# Patient Record
Sex: FEMALE | Race: WHITE | NOT HISPANIC OR LATINO | Employment: OTHER | URBAN - METROPOLITAN AREA
[De-identification: names, ages, dates, MRNs, and addresses within clinical notes are randomized per-mention and may not be internally consistent; named-entity substitution may affect disease eponyms.]

---

## 2017-01-05 ENCOUNTER — COMMUNICATION - HEALTHEAST (OUTPATIENT)
Dept: NURSING | Facility: CLINIC | Age: 77
End: 2017-01-05

## 2017-01-06 ENCOUNTER — COMMUNICATION - HEALTHEAST (OUTPATIENT)
Dept: ENDOCRINOLOGY | Facility: CLINIC | Age: 77
End: 2017-01-06

## 2017-01-06 DIAGNOSIS — I10 ESSENTIAL HYPERTENSION: ICD-10-CM

## 2017-01-06 DIAGNOSIS — D35.00 ADRENAL ADENOMA, UNSPECIFIED LATERALITY: ICD-10-CM

## 2017-01-13 ENCOUNTER — OFFICE VISIT - HEALTHEAST (OUTPATIENT)
Dept: NURSING | Facility: CLINIC | Age: 77
End: 2017-01-13

## 2017-01-13 DIAGNOSIS — F33.9 EPISODE OF RECURRENT MAJOR DEPRESSIVE DISORDER, UNSPECIFIED DEPRESSION EPISODE SEVERITY (H): ICD-10-CM

## 2017-01-13 DIAGNOSIS — E03.9 HYPOTHYROIDISM, UNSPECIFIED TYPE: ICD-10-CM

## 2017-01-13 DIAGNOSIS — I10 ESSENTIAL HYPERTENSION WITH GOAL BLOOD PRESSURE LESS THAN 140/90: ICD-10-CM

## 2017-01-13 DIAGNOSIS — F41.9 ANXIETY: ICD-10-CM

## 2017-01-13 DIAGNOSIS — D35.00 ADRENAL ADENOMA, UNSPECIFIED LATERALITY: ICD-10-CM

## 2017-01-18 ENCOUNTER — COMMUNICATION - HEALTHEAST (OUTPATIENT)
Dept: FAMILY MEDICINE | Facility: CLINIC | Age: 77
End: 2017-01-18

## 2017-01-18 DIAGNOSIS — K21.9 ESOPHAGEAL REFLUX: ICD-10-CM

## 2017-02-03 ENCOUNTER — COMMUNICATION - HEALTHEAST (OUTPATIENT)
Dept: NURSING | Facility: CLINIC | Age: 77
End: 2017-02-03

## 2017-02-21 ENCOUNTER — AMBULATORY - HEALTHEAST (OUTPATIENT)
Dept: PODIATRY | Age: 77
End: 2017-02-21

## 2017-02-21 DIAGNOSIS — L60.2 ONYCHAUXIS: ICD-10-CM

## 2017-02-21 DIAGNOSIS — M79.673 PAIN OF FOOT, UNSPECIFIED LATERALITY: ICD-10-CM

## 2017-02-28 ENCOUNTER — OFFICE VISIT - HEALTHEAST (OUTPATIENT)
Dept: FAMILY MEDICINE | Facility: CLINIC | Age: 77
End: 2017-02-28

## 2017-02-28 ENCOUNTER — OFFICE VISIT - HEALTHEAST (OUTPATIENT)
Dept: NURSING | Facility: CLINIC | Age: 77
End: 2017-02-28

## 2017-02-28 DIAGNOSIS — M54.16 LEFT LUMBAR RADICULOPATHY: ICD-10-CM

## 2017-02-28 DIAGNOSIS — I10 ESSENTIAL HYPERTENSION WITH GOAL BLOOD PRESSURE LESS THAN 140/90: ICD-10-CM

## 2017-02-28 DIAGNOSIS — M15.0 PRIMARY OSTEOARTHRITIS INVOLVING MULTIPLE JOINTS: ICD-10-CM

## 2017-02-28 ASSESSMENT — MIFFLIN-ST. JEOR: SCORE: 1261.94

## 2017-03-07 ENCOUNTER — HOSPITAL ENCOUNTER (OUTPATIENT)
Dept: MRI IMAGING | Facility: HOSPITAL | Age: 77
Discharge: HOME | End: 2017-03-07
Attending: FAMILY MEDICINE

## 2017-03-07 DIAGNOSIS — M54.16 LEFT LUMBAR RADICULOPATHY: ICD-10-CM

## 2017-03-08 ENCOUNTER — COMMUNICATION - HEALTHEAST (OUTPATIENT)
Dept: NURSING | Facility: CLINIC | Age: 77
End: 2017-03-08

## 2017-03-09 ENCOUNTER — COMMUNICATION - HEALTHEAST (OUTPATIENT)
Dept: FAMILY MEDICINE | Facility: CLINIC | Age: 77
End: 2017-03-09

## 2017-03-13 ENCOUNTER — HOSPITAL ENCOUNTER (OUTPATIENT)
Dept: PHYSICAL MEDICINE AND REHAB | Facility: CLINIC | Age: 77
Discharge: HOME | End: 2017-03-13
Attending: PHYSICAL MEDICINE & REHABILITATION

## 2017-03-13 DIAGNOSIS — M54.50 ACUTE LOW BACK PAIN: ICD-10-CM

## 2017-03-13 DIAGNOSIS — M47.816 LUMBAR FACET JOINT SYNDROME: ICD-10-CM

## 2017-03-13 DIAGNOSIS — M47.816 LUMBAR SPONDYLOSIS: ICD-10-CM

## 2017-03-13 DIAGNOSIS — M16.9 HIP OSTEOARTHRITIS: ICD-10-CM

## 2017-03-13 DIAGNOSIS — M53.3 SACROILIAC JOINT DYSFUNCTION OF LEFT SIDE: ICD-10-CM

## 2017-03-13 ASSESSMENT — MIFFLIN-ST. JEOR: SCORE: 1261.94

## 2017-03-14 ENCOUNTER — COMMUNICATION - HEALTHEAST (OUTPATIENT)
Dept: PHYSICAL MEDICINE AND REHAB | Facility: CLINIC | Age: 77
End: 2017-03-14

## 2017-03-14 ENCOUNTER — RECORDS - HEALTHEAST (OUTPATIENT)
Dept: GENERAL RADIOLOGY | Facility: CLINIC | Age: 77
End: 2017-03-14

## 2017-03-14 DIAGNOSIS — M16.9 OSTEOARTHRITIS OF HIP, UNSPECIFIED: ICD-10-CM

## 2017-03-15 ENCOUNTER — OFFICE VISIT - HEALTHEAST (OUTPATIENT)
Dept: PHYSICAL THERAPY | Facility: REHABILITATION | Age: 77
End: 2017-03-15

## 2017-03-15 DIAGNOSIS — M53.3 SACRAL PAIN: ICD-10-CM

## 2017-03-15 DIAGNOSIS — M25.552 ACUTE PAIN OF LEFT HIP: ICD-10-CM

## 2017-03-15 DIAGNOSIS — M62.81 GENERALIZED MUSCLE WEAKNESS: ICD-10-CM

## 2017-03-20 ENCOUNTER — OFFICE VISIT - HEALTHEAST (OUTPATIENT)
Dept: PHYSICAL THERAPY | Facility: REHABILITATION | Age: 77
End: 2017-03-20

## 2017-03-20 DIAGNOSIS — M53.3 SACRAL PAIN: ICD-10-CM

## 2017-03-20 DIAGNOSIS — M25.552 ACUTE PAIN OF LEFT HIP: ICD-10-CM

## 2017-03-20 DIAGNOSIS — M62.81 GENERALIZED MUSCLE WEAKNESS: ICD-10-CM

## 2017-03-21 ENCOUNTER — RECORDS - HEALTHEAST (OUTPATIENT)
Dept: GENERAL RADIOLOGY | Facility: CLINIC | Age: 77
End: 2017-03-21

## 2017-03-21 ENCOUNTER — OFFICE VISIT - HEALTHEAST (OUTPATIENT)
Dept: FAMILY MEDICINE | Facility: CLINIC | Age: 77
End: 2017-03-21

## 2017-03-21 DIAGNOSIS — M25.512 CHRONIC LEFT SHOULDER PAIN: ICD-10-CM

## 2017-03-21 DIAGNOSIS — M25.512 PAIN IN LEFT SHOULDER: ICD-10-CM

## 2017-03-21 DIAGNOSIS — G89.29 OTHER CHRONIC PAIN: ICD-10-CM

## 2017-03-21 DIAGNOSIS — G89.29 CHRONIC LEFT SHOULDER PAIN: ICD-10-CM

## 2017-03-24 ENCOUNTER — OFFICE VISIT - HEALTHEAST (OUTPATIENT)
Dept: PHYSICAL THERAPY | Facility: REHABILITATION | Age: 77
End: 2017-03-24

## 2017-03-24 DIAGNOSIS — M54.41 CHRONIC BILATERAL LOW BACK PAIN WITH RIGHT-SIDED SCIATICA: ICD-10-CM

## 2017-03-24 DIAGNOSIS — G89.29 CHRONIC BILATERAL LOW BACK PAIN WITH RIGHT-SIDED SCIATICA: ICD-10-CM

## 2017-03-24 DIAGNOSIS — M25.552 ACUTE PAIN OF LEFT HIP: ICD-10-CM

## 2017-03-24 DIAGNOSIS — M62.81 GENERALIZED MUSCLE WEAKNESS: ICD-10-CM

## 2017-03-24 DIAGNOSIS — M53.3 SACRAL PAIN: ICD-10-CM

## 2017-03-24 DIAGNOSIS — M54.50 LOW BACK PAIN: ICD-10-CM

## 2017-03-27 ENCOUNTER — OFFICE VISIT - HEALTHEAST (OUTPATIENT)
Dept: FAMILY MEDICINE | Facility: CLINIC | Age: 77
End: 2017-03-27

## 2017-03-27 ENCOUNTER — OFFICE VISIT - HEALTHEAST (OUTPATIENT)
Dept: PHYSICAL THERAPY | Facility: REHABILITATION | Age: 77
End: 2017-03-27

## 2017-03-27 DIAGNOSIS — K21.9 ESOPHAGEAL REFLUX: ICD-10-CM

## 2017-03-27 DIAGNOSIS — M54.41 CHRONIC BILATERAL LOW BACK PAIN WITH RIGHT-SIDED SCIATICA: ICD-10-CM

## 2017-03-27 DIAGNOSIS — M62.81 GENERALIZED MUSCLE WEAKNESS: ICD-10-CM

## 2017-03-27 DIAGNOSIS — M25.552 ACUTE PAIN OF LEFT HIP: ICD-10-CM

## 2017-03-27 DIAGNOSIS — M53.3 SACRAL PAIN: ICD-10-CM

## 2017-03-27 DIAGNOSIS — R23.3 SKIN SPOTS, RED: ICD-10-CM

## 2017-03-27 DIAGNOSIS — G89.29 CHRONIC BILATERAL LOW BACK PAIN WITH RIGHT-SIDED SCIATICA: ICD-10-CM

## 2017-03-31 ENCOUNTER — OFFICE VISIT - HEALTHEAST (OUTPATIENT)
Dept: PHYSICAL THERAPY | Facility: REHABILITATION | Age: 77
End: 2017-03-31

## 2017-03-31 ENCOUNTER — COMMUNICATION - HEALTHEAST (OUTPATIENT)
Dept: NURSING | Facility: CLINIC | Age: 77
End: 2017-03-31

## 2017-03-31 ENCOUNTER — HOSPITAL ENCOUNTER (OUTPATIENT)
Dept: PHYSICAL MEDICINE AND REHAB | Facility: CLINIC | Age: 77
Discharge: HOME | End: 2017-03-31
Attending: PHYSICAL MEDICINE & REHABILITATION

## 2017-03-31 DIAGNOSIS — M25.552 ACUTE PAIN OF LEFT HIP: ICD-10-CM

## 2017-03-31 DIAGNOSIS — G89.29 CHRONIC LOW BACK PAIN WITHOUT SCIATICA: ICD-10-CM

## 2017-03-31 DIAGNOSIS — M53.3 SACRAL PAIN: ICD-10-CM

## 2017-03-31 DIAGNOSIS — M25.552 LEFT HIP PAIN: ICD-10-CM

## 2017-03-31 DIAGNOSIS — M54.41 CHRONIC BILATERAL LOW BACK PAIN WITH RIGHT-SIDED SCIATICA: ICD-10-CM

## 2017-03-31 DIAGNOSIS — M54.50 CHRONIC LOW BACK PAIN WITHOUT SCIATICA: ICD-10-CM

## 2017-03-31 DIAGNOSIS — G89.29 CHRONIC BILATERAL LOW BACK PAIN WITH RIGHT-SIDED SCIATICA: ICD-10-CM

## 2017-03-31 DIAGNOSIS — M53.3 SACROILIAC JOINT DYSFUNCTION OF LEFT SIDE: ICD-10-CM

## 2017-03-31 DIAGNOSIS — M47.816 LUMBAR FACET JOINT SYNDROME: ICD-10-CM

## 2017-03-31 DIAGNOSIS — M62.81 GENERALIZED MUSCLE WEAKNESS: ICD-10-CM

## 2017-03-31 DIAGNOSIS — M47.816 LUMBAR SPONDYLOSIS: ICD-10-CM

## 2017-04-04 ENCOUNTER — HOSPITAL ENCOUNTER (OUTPATIENT)
Dept: PHYSICAL MEDICINE AND REHAB | Facility: CLINIC | Age: 77
Discharge: HOME | End: 2017-04-04
Attending: PHYSICAL MEDICINE & REHABILITATION

## 2017-04-04 DIAGNOSIS — M54.50 CHRONIC LOW BACK PAIN WITHOUT SCIATICA: ICD-10-CM

## 2017-04-04 DIAGNOSIS — G89.29 CHRONIC LOW BACK PAIN WITHOUT SCIATICA: ICD-10-CM

## 2017-04-04 DIAGNOSIS — M53.3 SACROILIAC JOINT DYSFUNCTION OF LEFT SIDE: ICD-10-CM

## 2017-04-05 ENCOUNTER — OFFICE VISIT - HEALTHEAST (OUTPATIENT)
Dept: PHYSICAL THERAPY | Facility: REHABILITATION | Age: 77
End: 2017-04-05

## 2017-04-05 DIAGNOSIS — M25.512 CHRONIC LEFT SHOULDER PAIN: ICD-10-CM

## 2017-04-05 DIAGNOSIS — M62.81 GENERALIZED MUSCLE WEAKNESS: ICD-10-CM

## 2017-04-05 DIAGNOSIS — M25.612 DECREASED ROM OF LEFT SHOULDER: ICD-10-CM

## 2017-04-05 DIAGNOSIS — G89.29 CHRONIC LEFT SHOULDER PAIN: ICD-10-CM

## 2017-04-07 ENCOUNTER — COMMUNICATION - HEALTHEAST (OUTPATIENT)
Dept: ENDOCRINOLOGY | Facility: CLINIC | Age: 77
End: 2017-04-07

## 2017-04-07 DIAGNOSIS — I10 ESSENTIAL HYPERTENSION: ICD-10-CM

## 2017-04-07 DIAGNOSIS — D35.00 ADRENAL ADENOMA, UNSPECIFIED LATERALITY: ICD-10-CM

## 2017-04-11 ENCOUNTER — OFFICE VISIT - HEALTHEAST (OUTPATIENT)
Dept: PHYSICAL THERAPY | Facility: REHABILITATION | Age: 77
End: 2017-04-11

## 2017-04-11 DIAGNOSIS — M53.3 SACRAL PAIN: ICD-10-CM

## 2017-04-11 DIAGNOSIS — G89.29 CHRONIC BILATERAL LOW BACK PAIN WITH RIGHT-SIDED SCIATICA: ICD-10-CM

## 2017-04-11 DIAGNOSIS — M54.41 CHRONIC BILATERAL LOW BACK PAIN WITH RIGHT-SIDED SCIATICA: ICD-10-CM

## 2017-04-11 DIAGNOSIS — M25.512 CHRONIC LEFT SHOULDER PAIN: ICD-10-CM

## 2017-04-11 DIAGNOSIS — M25.612 DECREASED ROM OF LEFT SHOULDER: ICD-10-CM

## 2017-04-11 DIAGNOSIS — M62.81 GENERALIZED MUSCLE WEAKNESS: ICD-10-CM

## 2017-04-11 DIAGNOSIS — M25.552 ACUTE PAIN OF LEFT HIP: ICD-10-CM

## 2017-04-11 DIAGNOSIS — G89.29 CHRONIC LEFT SHOULDER PAIN: ICD-10-CM

## 2017-04-13 ENCOUNTER — COMMUNICATION - HEALTHEAST (OUTPATIENT)
Dept: ENDOCRINOLOGY | Facility: CLINIC | Age: 77
End: 2017-04-13

## 2017-04-13 DIAGNOSIS — D35.00 ADRENAL ADENOMA: ICD-10-CM

## 2017-04-13 DIAGNOSIS — E03.9 HYPOTHYROIDISM: ICD-10-CM

## 2017-04-14 ENCOUNTER — OFFICE VISIT - HEALTHEAST (OUTPATIENT)
Dept: PHYSICAL THERAPY | Facility: REHABILITATION | Age: 77
End: 2017-04-14

## 2017-04-14 DIAGNOSIS — M25.552 ACUTE PAIN OF LEFT HIP: ICD-10-CM

## 2017-04-14 DIAGNOSIS — M25.612 DECREASED ROM OF LEFT SHOULDER: ICD-10-CM

## 2017-04-14 DIAGNOSIS — M62.81 GENERALIZED MUSCLE WEAKNESS: ICD-10-CM

## 2017-04-14 DIAGNOSIS — M53.3 SACRAL PAIN: ICD-10-CM

## 2017-04-14 DIAGNOSIS — G89.29 CHRONIC LEFT SHOULDER PAIN: ICD-10-CM

## 2017-04-14 DIAGNOSIS — G89.29 CHRONIC BILATERAL LOW BACK PAIN WITH RIGHT-SIDED SCIATICA: ICD-10-CM

## 2017-04-14 DIAGNOSIS — M54.41 CHRONIC BILATERAL LOW BACK PAIN WITH RIGHT-SIDED SCIATICA: ICD-10-CM

## 2017-04-14 DIAGNOSIS — M25.512 CHRONIC LEFT SHOULDER PAIN: ICD-10-CM

## 2017-04-17 ENCOUNTER — OFFICE VISIT - HEALTHEAST (OUTPATIENT)
Dept: FAMILY MEDICINE | Facility: CLINIC | Age: 77
End: 2017-04-17

## 2017-04-17 DIAGNOSIS — D35.00 ADRENAL ADENOMA: ICD-10-CM

## 2017-04-17 DIAGNOSIS — R53.1 WEAKNESS GENERALIZED: ICD-10-CM

## 2017-04-17 DIAGNOSIS — E55.9 VITAMIN D DEFICIENCY: ICD-10-CM

## 2017-04-17 DIAGNOSIS — F41.9 ANXIETY: ICD-10-CM

## 2017-04-17 DIAGNOSIS — G47.00 INSOMNIA: ICD-10-CM

## 2017-04-17 DIAGNOSIS — E03.9 HYPOTHYROIDISM: ICD-10-CM

## 2017-04-17 DIAGNOSIS — I10 HTN (HYPERTENSION): ICD-10-CM

## 2017-04-19 ENCOUNTER — COMMUNICATION - HEALTHEAST (OUTPATIENT)
Dept: FAMILY MEDICINE | Facility: CLINIC | Age: 77
End: 2017-04-19

## 2017-04-19 ENCOUNTER — HOSPITAL ENCOUNTER (OUTPATIENT)
Dept: PHYSICAL MEDICINE AND REHAB | Facility: CLINIC | Age: 77
Discharge: HOME | End: 2017-04-19
Attending: PHYSICAL MEDICINE & REHABILITATION

## 2017-04-19 DIAGNOSIS — M47.816 LUMBAR SPONDYLOSIS: ICD-10-CM

## 2017-04-19 DIAGNOSIS — M47.816 LUMBAR FACET JOINT SYNDROME: ICD-10-CM

## 2017-04-19 DIAGNOSIS — M54.16 LUMBAR RADICULAR PAIN: ICD-10-CM

## 2017-04-19 DIAGNOSIS — G89.29 CHRONIC LOW BACK PAIN WITHOUT SCIATICA: ICD-10-CM

## 2017-04-19 DIAGNOSIS — M53.3 SACROILIAC JOINT DYSFUNCTION OF LEFT SIDE: ICD-10-CM

## 2017-04-19 DIAGNOSIS — M54.50 CHRONIC LOW BACK PAIN WITHOUT SCIATICA: ICD-10-CM

## 2017-04-21 ENCOUNTER — OFFICE VISIT - HEALTHEAST (OUTPATIENT)
Dept: PHYSICAL THERAPY | Facility: REHABILITATION | Age: 77
End: 2017-04-21

## 2017-04-21 DIAGNOSIS — M62.81 GENERALIZED MUSCLE WEAKNESS: ICD-10-CM

## 2017-04-21 DIAGNOSIS — M54.41 CHRONIC BILATERAL LOW BACK PAIN WITH RIGHT-SIDED SCIATICA: ICD-10-CM

## 2017-04-21 DIAGNOSIS — M25.612 DECREASED ROM OF LEFT SHOULDER: ICD-10-CM

## 2017-04-21 DIAGNOSIS — G89.29 CHRONIC BILATERAL LOW BACK PAIN WITH RIGHT-SIDED SCIATICA: ICD-10-CM

## 2017-04-21 DIAGNOSIS — M25.512 CHRONIC LEFT SHOULDER PAIN: ICD-10-CM

## 2017-04-21 DIAGNOSIS — M25.552 ACUTE PAIN OF LEFT HIP: ICD-10-CM

## 2017-04-21 DIAGNOSIS — G89.29 CHRONIC LEFT SHOULDER PAIN: ICD-10-CM

## 2017-04-21 DIAGNOSIS — M53.3 SACRAL PAIN: ICD-10-CM

## 2017-04-24 ENCOUNTER — HOSPITAL ENCOUNTER (OUTPATIENT)
Dept: PHYSICAL MEDICINE AND REHAB | Facility: CLINIC | Age: 77
Discharge: HOME | End: 2017-04-24
Attending: PHYSICAL MEDICINE & REHABILITATION

## 2017-04-24 DIAGNOSIS — M47.816 LUMBAR SPONDYLOSIS: ICD-10-CM

## 2017-04-24 DIAGNOSIS — M54.50 CHRONIC LOW BACK PAIN WITHOUT SCIATICA: ICD-10-CM

## 2017-04-24 DIAGNOSIS — M47.816 LUMBAR FACET JOINT SYNDROME: ICD-10-CM

## 2017-04-24 DIAGNOSIS — M12.88 OTHER SPECIFIC ARTHROPATHIES, NOT ELSEWHERE CLASSIFIED, OTHER SPECIFIED SITE: ICD-10-CM

## 2017-04-24 DIAGNOSIS — G89.29 CHRONIC LOW BACK PAIN WITHOUT SCIATICA: ICD-10-CM

## 2017-04-24 ASSESSMENT — MIFFLIN-ST. JEOR: SCORE: 1226.1

## 2017-04-27 ENCOUNTER — OFFICE VISIT - HEALTHEAST (OUTPATIENT)
Dept: PHYSICAL THERAPY | Facility: REHABILITATION | Age: 77
End: 2017-04-27

## 2017-04-27 ENCOUNTER — COMMUNICATION - HEALTHEAST (OUTPATIENT)
Dept: PHYSICAL MEDICINE AND REHAB | Facility: CLINIC | Age: 77
End: 2017-04-27

## 2017-04-27 DIAGNOSIS — M53.3 SACRAL PAIN: ICD-10-CM

## 2017-04-27 DIAGNOSIS — G89.29 CHRONIC LEFT SHOULDER PAIN: ICD-10-CM

## 2017-04-27 DIAGNOSIS — M62.81 GENERALIZED MUSCLE WEAKNESS: ICD-10-CM

## 2017-04-27 DIAGNOSIS — M25.612 DECREASED ROM OF LEFT SHOULDER: ICD-10-CM

## 2017-04-27 DIAGNOSIS — M25.512 CHRONIC LEFT SHOULDER PAIN: ICD-10-CM

## 2017-05-01 ENCOUNTER — COMMUNICATION - HEALTHEAST (OUTPATIENT)
Dept: NURSING | Facility: CLINIC | Age: 77
End: 2017-05-01

## 2017-05-02 ENCOUNTER — OFFICE VISIT - HEALTHEAST (OUTPATIENT)
Dept: NURSING | Facility: CLINIC | Age: 77
End: 2017-05-02

## 2017-05-02 ENCOUNTER — AMBULATORY - HEALTHEAST (OUTPATIENT)
Dept: PODIATRY | Age: 77
End: 2017-05-02

## 2017-05-02 DIAGNOSIS — I15.2 HYPERTENSION SECONDARY TO ENDOCRINE DISORDER WITH GOAL BLOOD PRESSURE LESS THAN 140/90: ICD-10-CM

## 2017-05-02 DIAGNOSIS — L60.2 ONYCHAUXIS: ICD-10-CM

## 2017-05-02 DIAGNOSIS — M79.673 PAIN OF FOOT, UNSPECIFIED LATERALITY: ICD-10-CM

## 2017-05-02 DIAGNOSIS — E03.9 HYPOTHYROIDISM, UNSPECIFIED TYPE: ICD-10-CM

## 2017-05-02 DIAGNOSIS — E34.9 HYPERTENSION SECONDARY TO ENDOCRINE DISORDER WITH GOAL BLOOD PRESSURE LESS THAN 140/90: ICD-10-CM

## 2017-05-02 DIAGNOSIS — D35.00 ADRENAL ADENOMA, UNSPECIFIED LATERALITY: ICD-10-CM

## 2017-05-02 DIAGNOSIS — I15.2 HYPERTENSION DUE TO ENDOCRINE DISORDER: ICD-10-CM

## 2017-05-03 ENCOUNTER — OFFICE VISIT - HEALTHEAST (OUTPATIENT)
Dept: PHYSICAL THERAPY | Facility: REHABILITATION | Age: 77
End: 2017-05-03

## 2017-05-03 DIAGNOSIS — M25.612 DECREASED ROM OF LEFT SHOULDER: ICD-10-CM

## 2017-05-03 DIAGNOSIS — M53.3 SACRAL PAIN: ICD-10-CM

## 2017-05-03 DIAGNOSIS — M25.552 ACUTE PAIN OF LEFT HIP: ICD-10-CM

## 2017-05-03 DIAGNOSIS — G89.29 CHRONIC LEFT SHOULDER PAIN: ICD-10-CM

## 2017-05-03 DIAGNOSIS — M25.512 CHRONIC LEFT SHOULDER PAIN: ICD-10-CM

## 2017-05-03 DIAGNOSIS — M62.81 GENERALIZED MUSCLE WEAKNESS: ICD-10-CM

## 2017-05-08 ENCOUNTER — HOME CARE/HOSPICE - HEALTHEAST (OUTPATIENT)
Dept: HOME HEALTH SERVICES | Facility: HOME HEALTH | Age: 77
End: 2017-05-08

## 2017-05-10 ENCOUNTER — OFFICE VISIT - HEALTHEAST (OUTPATIENT)
Dept: OCCUPATIONAL THERAPY | Facility: HOSPITAL | Age: 77
End: 2017-05-10

## 2017-05-12 ENCOUNTER — COMMUNICATION - HEALTHEAST (OUTPATIENT)
Dept: HOME HEALTH SERVICES | Facility: HOME HEALTH | Age: 77
End: 2017-05-12

## 2017-05-12 ENCOUNTER — COMMUNICATION - HEALTHEAST (OUTPATIENT)
Dept: FAMILY MEDICINE | Facility: CLINIC | Age: 77
End: 2017-05-12

## 2017-05-15 ENCOUNTER — OFFICE VISIT - HEALTHEAST (OUTPATIENT)
Dept: FAMILY MEDICINE | Facility: CLINIC | Age: 77
End: 2017-05-15

## 2017-05-15 DIAGNOSIS — I95.1 ORTHOSTATIC HYPOTENSION: ICD-10-CM

## 2017-05-15 DIAGNOSIS — K22.70 BARRETT ESOPHAGUS: ICD-10-CM

## 2017-05-15 DIAGNOSIS — R42 LIGHTHEADED: ICD-10-CM

## 2017-05-15 DIAGNOSIS — G47.00 INSOMNIA: ICD-10-CM

## 2017-05-15 DIAGNOSIS — K21.9 GERD (GASTROESOPHAGEAL REFLUX DISEASE): ICD-10-CM

## 2017-05-15 DIAGNOSIS — R68.81 EARLY SATIETY: ICD-10-CM

## 2017-05-18 ENCOUNTER — COMMUNICATION - HEALTHEAST (OUTPATIENT)
Dept: SCHEDULING | Facility: CLINIC | Age: 77
End: 2017-05-18

## 2017-05-23 ENCOUNTER — AMBULATORY - HEALTHEAST (OUTPATIENT)
Dept: LAB | Facility: CLINIC | Age: 77
End: 2017-05-23

## 2017-05-23 DIAGNOSIS — E03.9 HYPOTHYROIDISM, UNSPECIFIED TYPE: ICD-10-CM

## 2017-05-23 DIAGNOSIS — D35.00 ADRENAL ADENOMA, UNSPECIFIED LATERALITY: ICD-10-CM

## 2017-05-23 LAB
CREAT SERPL-MCNC: 0.62 MG/DL (ref 0.6–1.1)
GFR SERPL CREATININE-BSD FRML MDRD: >60 ML/MIN/1.73M2

## 2017-05-26 ENCOUNTER — COMMUNICATION - HEALTHEAST (OUTPATIENT)
Dept: FAMILY MEDICINE | Facility: CLINIC | Age: 77
End: 2017-05-26

## 2017-05-30 ENCOUNTER — COMMUNICATION - HEALTHEAST (OUTPATIENT)
Dept: FAMILY MEDICINE | Facility: CLINIC | Age: 77
End: 2017-05-30

## 2017-05-30 ENCOUNTER — OFFICE VISIT - HEALTHEAST (OUTPATIENT)
Dept: ENDOCRINOLOGY | Facility: CLINIC | Age: 77
End: 2017-05-30

## 2017-05-30 DIAGNOSIS — E03.9 HYPOTHYROIDISM, UNSPECIFIED TYPE: ICD-10-CM

## 2017-05-30 DIAGNOSIS — D35.02 ADRENAL ADENOMA, LEFT: ICD-10-CM

## 2017-05-30 ASSESSMENT — MIFFLIN-ST. JEOR: SCORE: 1213.86

## 2017-05-31 ENCOUNTER — COMMUNICATION - HEALTHEAST (OUTPATIENT)
Dept: NURSING | Facility: CLINIC | Age: 77
End: 2017-05-31

## 2017-06-05 ENCOUNTER — OFFICE VISIT - HEALTHEAST (OUTPATIENT)
Dept: FAMILY MEDICINE | Facility: CLINIC | Age: 77
End: 2017-06-05

## 2017-06-05 ENCOUNTER — COMMUNICATION - HEALTHEAST (OUTPATIENT)
Dept: NURSING | Facility: CLINIC | Age: 77
End: 2017-06-05

## 2017-06-05 DIAGNOSIS — R42 LIGHTHEADED: ICD-10-CM

## 2017-06-05 DIAGNOSIS — F41.9 ANXIETY: ICD-10-CM

## 2017-06-05 DIAGNOSIS — M25.559 HIP PAIN: ICD-10-CM

## 2017-06-05 DIAGNOSIS — G47.00 INSOMNIA: ICD-10-CM

## 2017-06-05 DIAGNOSIS — I10 ESSENTIAL HYPERTENSION WITH GOAL BLOOD PRESSURE LESS THAN 140/90: ICD-10-CM

## 2017-06-05 DIAGNOSIS — M54.9 BACK PAIN: ICD-10-CM

## 2017-06-05 DIAGNOSIS — R60.9 EDEMA: ICD-10-CM

## 2017-06-05 DIAGNOSIS — R00.0 TACHYCARDIA: ICD-10-CM

## 2017-06-06 ENCOUNTER — RECORDS - HEALTHEAST (OUTPATIENT)
Dept: ADMINISTRATIVE | Facility: OTHER | Age: 77
End: 2017-06-06

## 2017-06-06 ENCOUNTER — HOSPITAL ENCOUNTER (OUTPATIENT)
Dept: CARDIOLOGY | Facility: HOSPITAL | Age: 77
Discharge: HOME | End: 2017-06-06
Attending: FAMILY MEDICINE

## 2017-06-06 DIAGNOSIS — R00.0 TACHYCARDIA: ICD-10-CM

## 2017-06-07 ENCOUNTER — COMMUNICATION - HEALTHEAST (OUTPATIENT)
Dept: FAMILY MEDICINE | Facility: CLINIC | Age: 77
End: 2017-06-07

## 2017-06-07 DIAGNOSIS — I10 ESSENTIAL HYPERTENSION WITH GOAL BLOOD PRESSURE LESS THAN 140/90: ICD-10-CM

## 2017-06-08 ENCOUNTER — RECORDS - HEALTHEAST (OUTPATIENT)
Dept: ADMINISTRATIVE | Facility: OTHER | Age: 77
End: 2017-06-08

## 2017-06-09 ENCOUNTER — COMMUNICATION - HEALTHEAST (OUTPATIENT)
Dept: FAMILY MEDICINE | Facility: CLINIC | Age: 77
End: 2017-06-09

## 2017-06-12 ENCOUNTER — COMMUNICATION - HEALTHEAST (OUTPATIENT)
Dept: FAMILY MEDICINE | Facility: CLINIC | Age: 77
End: 2017-06-12

## 2017-06-13 ENCOUNTER — HOSPITAL ENCOUNTER (OUTPATIENT)
Dept: CARDIOLOGY | Facility: HOSPITAL | Age: 77
Discharge: HOME | End: 2017-06-13
Attending: FAMILY MEDICINE

## 2017-06-16 ENCOUNTER — COMMUNICATION - HEALTHEAST (OUTPATIENT)
Dept: FAMILY MEDICINE | Facility: CLINIC | Age: 77
End: 2017-06-16

## 2017-06-19 ENCOUNTER — COMMUNICATION - HEALTHEAST (OUTPATIENT)
Dept: FAMILY MEDICINE | Facility: CLINIC | Age: 77
End: 2017-06-19

## 2017-06-19 ENCOUNTER — HOSPITAL ENCOUNTER (OUTPATIENT)
Dept: PHYSICAL MEDICINE AND REHAB | Facility: CLINIC | Age: 77
Discharge: HOME | End: 2017-06-19
Attending: FAMILY MEDICINE

## 2017-06-19 DIAGNOSIS — M53.3 SACROILIAC JOINT DYSFUNCTION OF LEFT SIDE: ICD-10-CM

## 2017-06-19 DIAGNOSIS — M54.50 CHRONIC LOW BACK PAIN WITHOUT SCIATICA: ICD-10-CM

## 2017-06-19 DIAGNOSIS — G89.29 CHRONIC LOW BACK PAIN WITHOUT SCIATICA: ICD-10-CM

## 2017-06-19 DIAGNOSIS — M47.816 LUMBAR SPONDYLOSIS: ICD-10-CM

## 2017-06-19 DIAGNOSIS — M47.816 LUMBAR FACET JOINT SYNDROME: ICD-10-CM

## 2017-06-20 ENCOUNTER — HOSPITAL ENCOUNTER (OUTPATIENT)
Dept: PHYSICAL MEDICINE AND REHAB | Facility: CLINIC | Age: 77
Discharge: HOME | End: 2017-06-20
Attending: PHYSICAL MEDICINE & REHABILITATION

## 2017-06-20 DIAGNOSIS — M12.88 OTHER SPECIFIC ARTHROPATHIES, NOT ELSEWHERE CLASSIFIED, OTHER SPECIFIED SITE: ICD-10-CM

## 2017-06-20 DIAGNOSIS — M47.816 LUMBAR SPONDYLOSIS: ICD-10-CM

## 2017-06-20 DIAGNOSIS — M47.816 LUMBAR FACET JOINT SYNDROME: ICD-10-CM

## 2017-06-20 DIAGNOSIS — M54.50 CHRONIC LOW BACK PAIN WITHOUT SCIATICA: ICD-10-CM

## 2017-06-20 DIAGNOSIS — G89.29 CHRONIC LOW BACK PAIN WITHOUT SCIATICA: ICD-10-CM

## 2017-06-22 ENCOUNTER — RECORDS - HEALTHEAST (OUTPATIENT)
Dept: ADMINISTRATIVE | Facility: OTHER | Age: 77
End: 2017-06-22

## 2017-06-23 ENCOUNTER — COMMUNICATION - HEALTHEAST (OUTPATIENT)
Dept: PHYSICAL MEDICINE AND REHAB | Facility: CLINIC | Age: 77
End: 2017-06-23

## 2017-06-25 ENCOUNTER — COMMUNICATION - HEALTHEAST (OUTPATIENT)
Dept: FAMILY MEDICINE | Facility: CLINIC | Age: 77
End: 2017-06-25

## 2017-06-25 DIAGNOSIS — G47.00 INSOMNIA: ICD-10-CM

## 2017-06-26 ENCOUNTER — AMBULATORY - HEALTHEAST (OUTPATIENT)
Dept: PHYSICAL MEDICINE AND REHAB | Facility: CLINIC | Age: 77
End: 2017-06-26

## 2017-06-26 DIAGNOSIS — M47.816 LUMBAR FACET JOINT SYNDROME: ICD-10-CM

## 2017-07-01 ENCOUNTER — HOSPITAL ENCOUNTER (OUTPATIENT)
Dept: MRI IMAGING | Facility: HOSPITAL | Age: 77
Discharge: HOME | End: 2017-07-01
Attending: PSYCHIATRY & NEUROLOGY

## 2017-07-01 DIAGNOSIS — R42 LIGHTHEADEDNESS: ICD-10-CM

## 2017-07-02 ENCOUNTER — COMMUNICATION - HEALTHEAST (OUTPATIENT)
Dept: FAMILY MEDICINE | Facility: CLINIC | Age: 77
End: 2017-07-02

## 2017-07-02 DIAGNOSIS — F32.A DEPRESSION: ICD-10-CM

## 2017-07-06 ENCOUNTER — COMMUNICATION - HEALTHEAST (OUTPATIENT)
Dept: FAMILY MEDICINE | Facility: CLINIC | Age: 77
End: 2017-07-06

## 2017-07-11 ENCOUNTER — COMMUNICATION - HEALTHEAST (OUTPATIENT)
Dept: NURSING | Facility: CLINIC | Age: 77
End: 2017-07-11

## 2017-07-11 ENCOUNTER — OFFICE VISIT - HEALTHEAST (OUTPATIENT)
Dept: GERIATRICS | Facility: CLINIC | Age: 77
End: 2017-07-11

## 2017-07-11 DIAGNOSIS — D35.02 ADRENAL ADENOMA, LEFT: ICD-10-CM

## 2017-07-11 DIAGNOSIS — F33.9 EPISODE OF RECURRENT MAJOR DEPRESSIVE DISORDER, UNSPECIFIED DEPRESSION EPISODE SEVERITY (H): ICD-10-CM

## 2017-07-11 DIAGNOSIS — I95.1 ORTHOSTATIC HYPOTENSION: ICD-10-CM

## 2017-07-11 DIAGNOSIS — R53.1 WEAKNESS: ICD-10-CM

## 2017-07-11 DIAGNOSIS — F41.9 ANXIETY: ICD-10-CM

## 2017-07-13 ENCOUNTER — OFFICE VISIT - HEALTHEAST (OUTPATIENT)
Dept: GERIATRICS | Facility: CLINIC | Age: 77
End: 2017-07-13

## 2017-07-13 DIAGNOSIS — G47.00 INSOMNIA: ICD-10-CM

## 2017-07-13 DIAGNOSIS — F41.9 ANXIETY: ICD-10-CM

## 2017-07-13 DIAGNOSIS — M54.50 CHRONIC LOW BACK PAIN: ICD-10-CM

## 2017-07-13 DIAGNOSIS — G89.29 CHRONIC LOW BACK PAIN: ICD-10-CM

## 2017-07-13 DIAGNOSIS — I10 HYPERTENSION: ICD-10-CM

## 2017-07-18 ENCOUNTER — OFFICE VISIT - HEALTHEAST (OUTPATIENT)
Dept: GERIATRICS | Facility: CLINIC | Age: 77
End: 2017-07-18

## 2017-07-18 DIAGNOSIS — R19.5 LOOSE STOOLS: ICD-10-CM

## 2017-07-18 DIAGNOSIS — F32.A DEPRESSION: ICD-10-CM

## 2017-07-18 DIAGNOSIS — R53.81 PHYSICAL DECONDITIONING: ICD-10-CM

## 2017-07-18 DIAGNOSIS — I95.1 ORTHOSTATIC HYPOTENSION: ICD-10-CM

## 2017-07-18 DIAGNOSIS — F41.9 ANXIETY: ICD-10-CM

## 2017-07-20 ENCOUNTER — OFFICE VISIT - HEALTHEAST (OUTPATIENT)
Dept: GERIATRICS | Facility: CLINIC | Age: 77
End: 2017-07-20

## 2017-07-20 DIAGNOSIS — F33.9 EPISODE OF RECURRENT MAJOR DEPRESSIVE DISORDER, UNSPECIFIED DEPRESSION EPISODE SEVERITY (H): ICD-10-CM

## 2017-07-20 DIAGNOSIS — F51.01 PRIMARY INSOMNIA: ICD-10-CM

## 2017-07-20 DIAGNOSIS — F41.9 ANXIETY: ICD-10-CM

## 2017-07-20 DIAGNOSIS — R42 DIZZINESS AND GIDDINESS: ICD-10-CM

## 2017-07-20 DIAGNOSIS — R53.1 WEAKNESS: ICD-10-CM

## 2017-07-25 ENCOUNTER — OFFICE VISIT - HEALTHEAST (OUTPATIENT)
Dept: GERIATRICS | Facility: CLINIC | Age: 77
End: 2017-07-25

## 2017-07-25 DIAGNOSIS — F33.9 EPISODE OF RECURRENT MAJOR DEPRESSIVE DISORDER, UNSPECIFIED DEPRESSION EPISODE SEVERITY (H): ICD-10-CM

## 2017-07-25 DIAGNOSIS — I95.1 ORTHOSTATIC HYPOTENSION: ICD-10-CM

## 2017-07-25 DIAGNOSIS — I10 ESSENTIAL HYPERTENSION WITH GOAL BLOOD PRESSURE LESS THAN 140/90: ICD-10-CM

## 2017-07-25 DIAGNOSIS — F51.01 PRIMARY INSOMNIA: ICD-10-CM

## 2017-07-25 DIAGNOSIS — R42 DIZZINESS AND GIDDINESS: ICD-10-CM

## 2017-07-27 ENCOUNTER — OFFICE VISIT - HEALTHEAST (OUTPATIENT)
Dept: GERIATRICS | Facility: CLINIC | Age: 77
End: 2017-07-27

## 2017-07-27 DIAGNOSIS — R53.81 PHYSICAL DECONDITIONING: ICD-10-CM

## 2017-07-27 DIAGNOSIS — I10 HYPERTENSION: ICD-10-CM

## 2017-07-27 DIAGNOSIS — F41.9 ANXIETY: ICD-10-CM

## 2017-07-31 ENCOUNTER — COMMUNICATION - HEALTHEAST (OUTPATIENT)
Dept: NURSING | Facility: CLINIC | Age: 77
End: 2017-07-31

## 2017-08-01 ENCOUNTER — OFFICE VISIT - HEALTHEAST (OUTPATIENT)
Dept: GERIATRICS | Facility: CLINIC | Age: 77
End: 2017-08-01

## 2017-08-01 DIAGNOSIS — F41.9 ANXIETY: ICD-10-CM

## 2017-08-01 DIAGNOSIS — G47.00 INSOMNIA: ICD-10-CM

## 2017-08-01 DIAGNOSIS — M79.10 MYALGIA: ICD-10-CM

## 2017-08-01 DIAGNOSIS — R53.81 PHYSICAL DECONDITIONING: ICD-10-CM

## 2017-08-03 ENCOUNTER — OFFICE VISIT - HEALTHEAST (OUTPATIENT)
Dept: GERIATRICS | Facility: CLINIC | Age: 77
End: 2017-08-03

## 2017-08-03 DIAGNOSIS — F41.9 ANXIETY: ICD-10-CM

## 2017-08-03 DIAGNOSIS — R53.81 PHYSICAL DECONDITIONING: ICD-10-CM

## 2017-08-03 DIAGNOSIS — I10 HYPERTENSION: ICD-10-CM

## 2017-08-03 DIAGNOSIS — M79.10 MYALGIA: ICD-10-CM

## 2017-08-08 ENCOUNTER — OFFICE VISIT - HEALTHEAST (OUTPATIENT)
Dept: GERIATRICS | Facility: CLINIC | Age: 77
End: 2017-08-08

## 2017-08-08 DIAGNOSIS — I10 HYPERTENSION: ICD-10-CM

## 2017-08-08 DIAGNOSIS — F41.9 ANXIETY: ICD-10-CM

## 2017-08-08 DIAGNOSIS — M79.10 MYALGIA: ICD-10-CM

## 2017-08-15 ENCOUNTER — OFFICE VISIT - HEALTHEAST (OUTPATIENT)
Dept: GERIATRICS | Facility: CLINIC | Age: 77
End: 2017-08-15

## 2017-08-15 DIAGNOSIS — F41.9 ANXIETY: ICD-10-CM

## 2017-08-15 DIAGNOSIS — I10 ESSENTIAL HYPERTENSION WITH GOAL BLOOD PRESSURE LESS THAN 140/90: ICD-10-CM

## 2017-08-15 DIAGNOSIS — F33.9 EPISODE OF RECURRENT MAJOR DEPRESSIVE DISORDER, UNSPECIFIED DEPRESSION EPISODE SEVERITY (H): ICD-10-CM

## 2017-08-15 DIAGNOSIS — R53.1 WEAKNESS: ICD-10-CM

## 2017-08-22 ENCOUNTER — OFFICE VISIT - HEALTHEAST (OUTPATIENT)
Dept: GERIATRICS | Facility: CLINIC | Age: 77
End: 2017-08-22

## 2017-08-22 DIAGNOSIS — R42 DIZZINESS AND GIDDINESS: ICD-10-CM

## 2017-08-22 DIAGNOSIS — I10 ESSENTIAL HYPERTENSION WITH GOAL BLOOD PRESSURE LESS THAN 140/90: ICD-10-CM

## 2017-08-22 DIAGNOSIS — R53.1 WEAKNESS: ICD-10-CM

## 2017-08-24 ENCOUNTER — HOME CARE/HOSPICE - HEALTHEAST (OUTPATIENT)
Dept: HOME HEALTH SERVICES | Facility: HOME HEALTH | Age: 77
End: 2017-08-24

## 2017-08-24 ENCOUNTER — OFFICE VISIT - HEALTHEAST (OUTPATIENT)
Dept: GERIATRICS | Facility: CLINIC | Age: 77
End: 2017-08-24

## 2017-08-24 DIAGNOSIS — F33.9 EPISODE OF RECURRENT MAJOR DEPRESSIVE DISORDER, UNSPECIFIED DEPRESSION EPISODE SEVERITY (H): ICD-10-CM

## 2017-08-24 DIAGNOSIS — F41.9 ANXIETY: ICD-10-CM

## 2017-08-24 DIAGNOSIS — I10 ESSENTIAL HYPERTENSION WITH GOAL BLOOD PRESSURE LESS THAN 140/90: ICD-10-CM

## 2017-08-24 DIAGNOSIS — D35.02 ADRENAL ADENOMA, LEFT: ICD-10-CM

## 2017-08-25 ENCOUNTER — RECORDS - HEALTHEAST (OUTPATIENT)
Dept: ADMINISTRATIVE | Facility: OTHER | Age: 77
End: 2017-08-25

## 2017-08-26 ENCOUNTER — HOME CARE/HOSPICE - HEALTHEAST (OUTPATIENT)
Dept: HOME HEALTH SERVICES | Facility: HOME HEALTH | Age: 77
End: 2017-08-26

## 2017-08-27 ENCOUNTER — COMMUNICATION - HEALTHEAST (OUTPATIENT)
Dept: HOME HEALTH SERVICES | Facility: HOME HEALTH | Age: 77
End: 2017-08-27

## 2017-08-28 ENCOUNTER — COMMUNICATION - HEALTHEAST (OUTPATIENT)
Dept: FAMILY MEDICINE | Facility: CLINIC | Age: 77
End: 2017-08-28

## 2017-08-28 ENCOUNTER — AMBULATORY - HEALTHEAST (OUTPATIENT)
Dept: GERIATRICS | Facility: CLINIC | Age: 77
End: 2017-08-28

## 2017-08-28 ENCOUNTER — COMMUNICATION - HEALTHEAST (OUTPATIENT)
Dept: GERIATRICS | Facility: CLINIC | Age: 77
End: 2017-08-28

## 2017-08-30 ENCOUNTER — HOME CARE/HOSPICE - HEALTHEAST (OUTPATIENT)
Dept: HOME HEALTH SERVICES | Facility: HOME HEALTH | Age: 77
End: 2017-08-30

## 2017-08-30 ENCOUNTER — COMMUNICATION - HEALTHEAST (OUTPATIENT)
Dept: HOME HEALTH SERVICES | Facility: HOME HEALTH | Age: 77
End: 2017-08-30

## 2017-08-31 ENCOUNTER — HOME CARE/HOSPICE - HEALTHEAST (OUTPATIENT)
Dept: HOME HEALTH SERVICES | Facility: HOME HEALTH | Age: 77
End: 2017-08-31

## 2017-09-01 ENCOUNTER — HOME CARE/HOSPICE - HEALTHEAST (OUTPATIENT)
Dept: HOME HEALTH SERVICES | Facility: HOME HEALTH | Age: 77
End: 2017-09-01

## 2017-09-04 ENCOUNTER — HOME CARE/HOSPICE - HEALTHEAST (OUTPATIENT)
Dept: HOME HEALTH SERVICES | Facility: HOME HEALTH | Age: 77
End: 2017-09-04

## 2017-09-04 ENCOUNTER — COMMUNICATION - HEALTHEAST (OUTPATIENT)
Dept: HOME HEALTH SERVICES | Facility: HOME HEALTH | Age: 77
End: 2017-09-04

## 2017-09-05 ENCOUNTER — COMMUNICATION - HEALTHEAST (OUTPATIENT)
Dept: FAMILY MEDICINE | Facility: CLINIC | Age: 77
End: 2017-09-05

## 2017-09-05 ENCOUNTER — OFFICE VISIT - HEALTHEAST (OUTPATIENT)
Dept: FAMILY MEDICINE | Facility: CLINIC | Age: 77
End: 2017-09-05

## 2017-09-05 ENCOUNTER — HOME CARE/HOSPICE - HEALTHEAST (OUTPATIENT)
Dept: HOME HEALTH SERVICES | Facility: HOME HEALTH | Age: 77
End: 2017-09-05

## 2017-09-05 DIAGNOSIS — R42 LIGHTHEADED: ICD-10-CM

## 2017-09-05 DIAGNOSIS — E03.9 HYPOTHYROIDISM: ICD-10-CM

## 2017-09-05 DIAGNOSIS — N39.0 UTI (URINARY TRACT INFECTION): ICD-10-CM

## 2017-09-05 DIAGNOSIS — Z00.00 HEALTHCARE MAINTENANCE: ICD-10-CM

## 2017-09-05 DIAGNOSIS — I10 HTN (HYPERTENSION): ICD-10-CM

## 2017-09-05 DIAGNOSIS — F41.9 ANXIETY: ICD-10-CM

## 2017-09-05 DIAGNOSIS — R41.0 CONFUSION: ICD-10-CM

## 2017-09-06 ENCOUNTER — COMMUNICATION - HEALTHEAST (OUTPATIENT)
Dept: HOME HEALTH SERVICES | Facility: HOME HEALTH | Age: 77
End: 2017-09-06

## 2017-09-07 ENCOUNTER — HOME CARE/HOSPICE - HEALTHEAST (OUTPATIENT)
Dept: HOME HEALTH SERVICES | Facility: HOME HEALTH | Age: 77
End: 2017-09-07

## 2017-09-07 ENCOUNTER — COMMUNICATION - HEALTHEAST (OUTPATIENT)
Dept: FAMILY MEDICINE | Facility: CLINIC | Age: 77
End: 2017-09-07

## 2017-09-07 DIAGNOSIS — I10 ESSENTIAL HYPERTENSION WITH GOAL BLOOD PRESSURE LESS THAN 140/90: ICD-10-CM

## 2017-09-08 ENCOUNTER — HOME CARE/HOSPICE - HEALTHEAST (OUTPATIENT)
Dept: HOME HEALTH SERVICES | Facility: HOME HEALTH | Age: 77
End: 2017-09-08

## 2017-09-11 ENCOUNTER — HOME CARE/HOSPICE - HEALTHEAST (OUTPATIENT)
Dept: HOME HEALTH SERVICES | Facility: HOME HEALTH | Age: 77
End: 2017-09-11

## 2017-09-11 ENCOUNTER — COMMUNICATION - HEALTHEAST (OUTPATIENT)
Dept: FAMILY MEDICINE | Facility: CLINIC | Age: 77
End: 2017-09-11

## 2017-09-12 ENCOUNTER — HOME CARE/HOSPICE - HEALTHEAST (OUTPATIENT)
Dept: HOME HEALTH SERVICES | Facility: HOME HEALTH | Age: 77
End: 2017-09-12

## 2017-09-12 ENCOUNTER — COMMUNICATION - HEALTHEAST (OUTPATIENT)
Dept: HOME HEALTH SERVICES | Facility: HOME HEALTH | Age: 77
End: 2017-09-12

## 2017-09-13 ENCOUNTER — HOME CARE/HOSPICE - HEALTHEAST (OUTPATIENT)
Dept: HOME HEALTH SERVICES | Facility: HOME HEALTH | Age: 77
End: 2017-09-13

## 2017-09-13 ENCOUNTER — COMMUNICATION - HEALTHEAST (OUTPATIENT)
Dept: SCHEDULING | Facility: CLINIC | Age: 77
End: 2017-09-13

## 2017-09-15 ENCOUNTER — HOME CARE/HOSPICE - HEALTHEAST (OUTPATIENT)
Dept: HOME HEALTH SERVICES | Facility: HOME HEALTH | Age: 77
End: 2017-09-15

## 2017-09-18 ENCOUNTER — HOME CARE/HOSPICE - HEALTHEAST (OUTPATIENT)
Dept: HOME HEALTH SERVICES | Facility: HOME HEALTH | Age: 77
End: 2017-09-18

## 2017-09-18 ENCOUNTER — COMMUNICATION - HEALTHEAST (OUTPATIENT)
Dept: HOME HEALTH SERVICES | Facility: HOME HEALTH | Age: 77
End: 2017-09-18

## 2017-09-19 ENCOUNTER — HOME CARE/HOSPICE - HEALTHEAST (OUTPATIENT)
Dept: HOME HEALTH SERVICES | Facility: HOME HEALTH | Age: 77
End: 2017-09-19

## 2017-09-20 ENCOUNTER — COMMUNICATION - HEALTHEAST (OUTPATIENT)
Dept: NURSING | Facility: CLINIC | Age: 77
End: 2017-09-20

## 2017-09-20 ENCOUNTER — HOME CARE/HOSPICE - HEALTHEAST (OUTPATIENT)
Dept: HOME HEALTH SERVICES | Facility: HOME HEALTH | Age: 77
End: 2017-09-20

## 2017-09-21 ENCOUNTER — HOME CARE/HOSPICE - HEALTHEAST (OUTPATIENT)
Dept: HOME HEALTH SERVICES | Facility: HOME HEALTH | Age: 77
End: 2017-09-21

## 2017-09-24 ENCOUNTER — HOME CARE/HOSPICE - HEALTHEAST (OUTPATIENT)
Dept: HOME HEALTH SERVICES | Facility: HOME HEALTH | Age: 77
End: 2017-09-24

## 2017-09-25 ENCOUNTER — COMMUNICATION - HEALTHEAST (OUTPATIENT)
Dept: FAMILY MEDICINE | Facility: CLINIC | Age: 77
End: 2017-09-25

## 2017-09-27 ENCOUNTER — COMMUNICATION - HEALTHEAST (OUTPATIENT)
Dept: FAMILY MEDICINE | Facility: CLINIC | Age: 77
End: 2017-09-27

## 2017-09-28 ENCOUNTER — COMMUNICATION - HEALTHEAST (OUTPATIENT)
Dept: HOME HEALTH SERVICES | Facility: HOME HEALTH | Age: 77
End: 2017-09-28

## 2017-09-28 ENCOUNTER — HOME CARE/HOSPICE - HEALTHEAST (OUTPATIENT)
Dept: HOME HEALTH SERVICES | Facility: HOME HEALTH | Age: 77
End: 2017-09-28

## 2017-09-28 ENCOUNTER — COMMUNICATION - HEALTHEAST (OUTPATIENT)
Dept: FAMILY MEDICINE | Facility: CLINIC | Age: 77
End: 2017-09-28

## 2017-10-02 ENCOUNTER — COMMUNICATION - HEALTHEAST (OUTPATIENT)
Dept: FAMILY MEDICINE | Facility: CLINIC | Age: 77
End: 2017-10-02

## 2017-10-02 ENCOUNTER — HOME CARE/HOSPICE - HEALTHEAST (OUTPATIENT)
Dept: HOME HEALTH SERVICES | Facility: HOME HEALTH | Age: 77
End: 2017-10-02

## 2017-10-02 ENCOUNTER — OFFICE VISIT - HEALTHEAST (OUTPATIENT)
Dept: FAMILY MEDICINE | Facility: CLINIC | Age: 77
End: 2017-10-02

## 2017-10-02 DIAGNOSIS — I95.1 ORTHOSTATIC HYPOTENSION: ICD-10-CM

## 2017-10-02 DIAGNOSIS — G47.00 INSOMNIA: ICD-10-CM

## 2017-10-02 DIAGNOSIS — F33.9 EPISODE OF RECURRENT MAJOR DEPRESSIVE DISORDER, UNSPECIFIED DEPRESSION EPISODE SEVERITY (H): ICD-10-CM

## 2017-10-02 DIAGNOSIS — R60.0 PEDAL EDEMA: ICD-10-CM

## 2017-10-02 DIAGNOSIS — F41.9 ANXIETY: ICD-10-CM

## 2017-10-02 DIAGNOSIS — I10 SUPINE HYPERTENSION: ICD-10-CM

## 2017-10-03 ENCOUNTER — HOME CARE/HOSPICE - HEALTHEAST (OUTPATIENT)
Dept: HOME HEALTH SERVICES | Facility: HOME HEALTH | Age: 77
End: 2017-10-03

## 2017-10-03 ENCOUNTER — COMMUNICATION - HEALTHEAST (OUTPATIENT)
Dept: PHYSICAL THERAPY | Facility: CLINIC | Age: 77
End: 2017-10-03

## 2017-10-04 ENCOUNTER — OFFICE VISIT - HEALTHEAST (OUTPATIENT)
Dept: CARDIOLOGY | Facility: CLINIC | Age: 77
End: 2017-10-04

## 2017-10-04 ENCOUNTER — HOME CARE/HOSPICE - HEALTHEAST (OUTPATIENT)
Dept: HOME HEALTH SERVICES | Facility: HOME HEALTH | Age: 77
End: 2017-10-04

## 2017-10-04 DIAGNOSIS — I10 ESSENTIAL HYPERTENSION: ICD-10-CM

## 2017-10-04 ASSESSMENT — MIFFLIN-ST. JEOR: SCORE: 1193.9

## 2017-10-05 ENCOUNTER — HOME CARE/HOSPICE - HEALTHEAST (OUTPATIENT)
Dept: HOME HEALTH SERVICES | Facility: HOME HEALTH | Age: 77
End: 2017-10-05

## 2017-10-06 ENCOUNTER — HOME CARE/HOSPICE - HEALTHEAST (OUTPATIENT)
Dept: HOME HEALTH SERVICES | Facility: HOME HEALTH | Age: 77
End: 2017-10-06

## 2017-10-10 ENCOUNTER — HOME CARE/HOSPICE - HEALTHEAST (OUTPATIENT)
Dept: HOME HEALTH SERVICES | Facility: HOME HEALTH | Age: 77
End: 2017-10-10

## 2017-10-11 ENCOUNTER — HOME CARE/HOSPICE - HEALTHEAST (OUTPATIENT)
Dept: HOME HEALTH SERVICES | Facility: HOME HEALTH | Age: 77
End: 2017-10-11

## 2017-10-12 ENCOUNTER — HOME CARE/HOSPICE - HEALTHEAST (OUTPATIENT)
Dept: HOME HEALTH SERVICES | Facility: HOME HEALTH | Age: 77
End: 2017-10-12

## 2017-10-13 ENCOUNTER — HOME CARE/HOSPICE - HEALTHEAST (OUTPATIENT)
Dept: HOME HEALTH SERVICES | Facility: HOME HEALTH | Age: 77
End: 2017-10-13

## 2017-10-16 ENCOUNTER — HOME CARE/HOSPICE - HEALTHEAST (OUTPATIENT)
Dept: HOME HEALTH SERVICES | Facility: HOME HEALTH | Age: 77
End: 2017-10-16

## 2017-10-17 ENCOUNTER — HOME CARE/HOSPICE - HEALTHEAST (OUTPATIENT)
Dept: HOME HEALTH SERVICES | Facility: HOME HEALTH | Age: 77
End: 2017-10-17

## 2017-10-19 ENCOUNTER — HOME CARE/HOSPICE - HEALTHEAST (OUTPATIENT)
Dept: HOME HEALTH SERVICES | Facility: HOME HEALTH | Age: 77
End: 2017-10-19

## 2017-10-23 ENCOUNTER — HOME CARE/HOSPICE - HEALTHEAST (OUTPATIENT)
Dept: HOME HEALTH SERVICES | Facility: HOME HEALTH | Age: 77
End: 2017-10-23

## 2017-10-23 ENCOUNTER — COMMUNICATION - HEALTHEAST (OUTPATIENT)
Dept: HOME HEALTH SERVICES | Facility: HOME HEALTH | Age: 77
End: 2017-10-23

## 2017-10-23 ENCOUNTER — COMMUNICATION - HEALTHEAST (OUTPATIENT)
Dept: FAMILY MEDICINE | Facility: CLINIC | Age: 77
End: 2017-10-23

## 2017-10-23 DIAGNOSIS — M54.50 LOW BACK PAIN: ICD-10-CM

## 2017-10-24 ENCOUNTER — HOME CARE/HOSPICE - HEALTHEAST (OUTPATIENT)
Dept: HOME HEALTH SERVICES | Facility: HOME HEALTH | Age: 77
End: 2017-10-24

## 2017-11-03 ENCOUNTER — OFFICE VISIT - HEALTHEAST (OUTPATIENT)
Dept: FAMILY MEDICINE | Facility: CLINIC | Age: 77
End: 2017-11-03

## 2017-11-03 DIAGNOSIS — I95.1 ORTHOSTATIC HYPOTENSION: ICD-10-CM

## 2017-11-03 DIAGNOSIS — G47.00 INSOMNIA: ICD-10-CM

## 2017-11-03 DIAGNOSIS — I10 ESSENTIAL HYPERTENSION WITH GOAL BLOOD PRESSURE LESS THAN 140/90: ICD-10-CM

## 2017-11-03 DIAGNOSIS — F41.9 ANXIETY: ICD-10-CM

## 2017-11-06 ENCOUNTER — HOSPITAL ENCOUNTER (OUTPATIENT)
Dept: PHYSICAL MEDICINE AND REHAB | Facility: CLINIC | Age: 77
Discharge: HOME | End: 2017-11-06
Attending: PHYSICAL MEDICINE & REHABILITATION

## 2017-11-06 DIAGNOSIS — M54.50 CHRONIC LOW BACK PAIN: ICD-10-CM

## 2017-11-06 DIAGNOSIS — G89.29 CHRONIC LOW BACK PAIN: ICD-10-CM

## 2017-11-06 DIAGNOSIS — R29.898 PROXIMAL LEG WEAKNESS: ICD-10-CM

## 2017-11-06 DIAGNOSIS — R53.81 PHYSICAL DECONDITIONING: ICD-10-CM

## 2017-11-06 DIAGNOSIS — M53.3 SACROILIAC JOINT DYSFUNCTION OF LEFT SIDE: ICD-10-CM

## 2017-11-06 DIAGNOSIS — F32.A DEPRESSION: ICD-10-CM

## 2017-11-07 ENCOUNTER — RECORDS - HEALTHEAST (OUTPATIENT)
Dept: ADMINISTRATIVE | Facility: OTHER | Age: 77
End: 2017-11-07

## 2017-11-09 ENCOUNTER — OFFICE VISIT - HEALTHEAST (OUTPATIENT)
Dept: PHYSICAL THERAPY | Facility: REHABILITATION | Age: 77
End: 2017-11-09

## 2017-11-09 DIAGNOSIS — M54.41 CHRONIC BILATERAL LOW BACK PAIN WITH BILATERAL SCIATICA: ICD-10-CM

## 2017-11-09 DIAGNOSIS — G89.29 CHRONIC BILATERAL LOW BACK PAIN WITH BILATERAL SCIATICA: ICD-10-CM

## 2017-11-09 DIAGNOSIS — R26.81 UNSTEADINESS ON FEET: ICD-10-CM

## 2017-11-09 DIAGNOSIS — M62.81 GENERALIZED MUSCLE WEAKNESS: ICD-10-CM

## 2017-11-09 DIAGNOSIS — M54.42 CHRONIC BILATERAL LOW BACK PAIN WITH BILATERAL SCIATICA: ICD-10-CM

## 2017-11-09 DIAGNOSIS — M53.86 DECREASED ROM OF LUMBAR SPINE: ICD-10-CM

## 2017-11-13 ENCOUNTER — OFFICE VISIT - HEALTHEAST (OUTPATIENT)
Dept: CARDIOLOGY | Facility: CLINIC | Age: 77
End: 2017-11-13

## 2017-11-13 DIAGNOSIS — I95.1 ORTHOSTATIC HYPOTENSION: ICD-10-CM

## 2017-11-13 DIAGNOSIS — I10 ESSENTIAL HYPERTENSION: ICD-10-CM

## 2017-11-13 ASSESSMENT — MIFFLIN-ST. JEOR: SCORE: 1169.4

## 2017-11-15 ENCOUNTER — OFFICE VISIT - HEALTHEAST (OUTPATIENT)
Dept: PHYSICAL THERAPY | Facility: REHABILITATION | Age: 77
End: 2017-11-15

## 2017-11-15 DIAGNOSIS — M25.612 DECREASED ROM OF LEFT SHOULDER: ICD-10-CM

## 2017-11-15 DIAGNOSIS — M62.81 GENERALIZED MUSCLE WEAKNESS: ICD-10-CM

## 2017-11-15 DIAGNOSIS — R26.81 UNSTEADINESS ON FEET: ICD-10-CM

## 2017-11-15 DIAGNOSIS — M53.86 DECREASED ROM OF LUMBAR SPINE: ICD-10-CM

## 2017-11-15 DIAGNOSIS — M25.552 ACUTE PAIN OF LEFT HIP: ICD-10-CM

## 2017-11-15 DIAGNOSIS — M54.41 CHRONIC BILATERAL LOW BACK PAIN WITH RIGHT-SIDED SCIATICA: ICD-10-CM

## 2017-11-15 DIAGNOSIS — G89.29 CHRONIC LEFT SHOULDER PAIN: ICD-10-CM

## 2017-11-15 DIAGNOSIS — G89.29 CHRONIC BILATERAL LOW BACK PAIN WITH BILATERAL SCIATICA: ICD-10-CM

## 2017-11-15 DIAGNOSIS — M25.512 CHRONIC LEFT SHOULDER PAIN: ICD-10-CM

## 2017-11-15 DIAGNOSIS — M53.3 SACRAL PAIN: ICD-10-CM

## 2017-11-15 DIAGNOSIS — G89.29 CHRONIC BILATERAL LOW BACK PAIN WITH RIGHT-SIDED SCIATICA: ICD-10-CM

## 2017-11-15 DIAGNOSIS — M54.42 CHRONIC BILATERAL LOW BACK PAIN WITH BILATERAL SCIATICA: ICD-10-CM

## 2017-11-15 DIAGNOSIS — M54.41 CHRONIC BILATERAL LOW BACK PAIN WITH BILATERAL SCIATICA: ICD-10-CM

## 2017-11-16 LAB
ALBUMIN PERCENT: 58.5 % (ref 51–67)
ALBUMIN SERPL ELPH-MCNC: 4 G/DL (ref 3.2–4.7)
ALPHA 1 PERCENT: 3.1 % (ref 2–4)
ALPHA 2 PERCENT: 12.3 % (ref 5–13)
ALPHA1 GLOB SERPL ELPH-MCNC: 0.2 G/DL (ref 0.1–0.3)
ALPHA2 GLOB SERPL ELPH-MCNC: 0.8 G/DL (ref 0.4–0.9)
B-GLOBULIN SERPL ELPH-MCNC: 0.9 G/DL (ref 0.7–1.2)
BETA PERCENT: 13.1 % (ref 10–17)
GAMMA GLOB SERPL ELPH-MCNC: 0.9 G/DL (ref 0.6–1.4)
GAMMA GLOBULIN PERCENT: 13 % (ref 9–20)
PATH ICD:: NORMAL
PROT PATTERN SERPL ELPH-IMP: NORMAL
PROT SERPL-MCNC: 6.8 G/DL (ref 6–8)
REVIEWING PATHOLOGIST: NORMAL

## 2017-11-20 ENCOUNTER — HOSPITAL ENCOUNTER (OUTPATIENT)
Dept: PHYSICAL MEDICINE AND REHAB | Facility: CLINIC | Age: 77
Discharge: HOME | End: 2017-11-20
Attending: PHYSICIAN ASSISTANT

## 2017-11-20 ENCOUNTER — OFFICE VISIT - HEALTHEAST (OUTPATIENT)
Dept: PHYSICAL THERAPY | Facility: REHABILITATION | Age: 77
End: 2017-11-20

## 2017-11-20 DIAGNOSIS — M53.3 SACROILIAC JOINT DYSFUNCTION OF LEFT SIDE: ICD-10-CM

## 2017-11-20 DIAGNOSIS — M54.41 CHRONIC BILATERAL LOW BACK PAIN WITH BILATERAL SCIATICA: ICD-10-CM

## 2017-11-20 DIAGNOSIS — M25.612 DECREASED ROM OF LEFT SHOULDER: ICD-10-CM

## 2017-11-20 DIAGNOSIS — M53.86 DECREASED ROM OF LUMBAR SPINE: ICD-10-CM

## 2017-11-20 DIAGNOSIS — G89.29 CHRONIC BILATERAL LOW BACK PAIN WITH BILATERAL SCIATICA: ICD-10-CM

## 2017-11-20 DIAGNOSIS — G89.29 CHRONIC LEFT SHOULDER PAIN: ICD-10-CM

## 2017-11-20 DIAGNOSIS — M47.816 LUMBAR FACET JOINT SYNDROME: ICD-10-CM

## 2017-11-20 DIAGNOSIS — M62.81 GENERALIZED MUSCLE WEAKNESS: ICD-10-CM

## 2017-11-20 DIAGNOSIS — M25.512 CHRONIC LEFT SHOULDER PAIN: ICD-10-CM

## 2017-11-20 DIAGNOSIS — M54.50 CHRONIC LOW BACK PAIN WITHOUT SCIATICA: ICD-10-CM

## 2017-11-20 DIAGNOSIS — M25.552 ACUTE PAIN OF LEFT HIP: ICD-10-CM

## 2017-11-20 DIAGNOSIS — R26.81 UNSTEADINESS ON FEET: ICD-10-CM

## 2017-11-20 DIAGNOSIS — G89.29 CHRONIC LOW BACK PAIN WITHOUT SCIATICA: ICD-10-CM

## 2017-11-20 DIAGNOSIS — M54.42 CHRONIC BILATERAL LOW BACK PAIN WITH BILATERAL SCIATICA: ICD-10-CM

## 2017-11-20 DIAGNOSIS — M53.3 SACRAL PAIN: ICD-10-CM

## 2017-11-24 ENCOUNTER — COMMUNICATION - HEALTHEAST (OUTPATIENT)
Dept: FAMILY MEDICINE | Facility: CLINIC | Age: 77
End: 2017-11-24

## 2017-11-24 ENCOUNTER — OFFICE VISIT - HEALTHEAST (OUTPATIENT)
Dept: FAMILY MEDICINE | Facility: CLINIC | Age: 77
End: 2017-11-24

## 2017-11-24 DIAGNOSIS — B35.1 ONYCHOMYCOSIS: ICD-10-CM

## 2017-11-24 DIAGNOSIS — R21 RASH: ICD-10-CM

## 2017-11-24 DIAGNOSIS — B35.3 TINEA PEDIS: ICD-10-CM

## 2017-11-24 DIAGNOSIS — L84 CALLUS OF FOOT: ICD-10-CM

## 2017-11-27 ENCOUNTER — OFFICE VISIT - HEALTHEAST (OUTPATIENT)
Dept: PHYSICAL THERAPY | Facility: REHABILITATION | Age: 77
End: 2017-11-27

## 2017-11-27 DIAGNOSIS — M54.42 CHRONIC BILATERAL LOW BACK PAIN WITH BILATERAL SCIATICA: ICD-10-CM

## 2017-11-27 DIAGNOSIS — G89.29 CHRONIC BILATERAL LOW BACK PAIN WITH BILATERAL SCIATICA: ICD-10-CM

## 2017-11-27 DIAGNOSIS — G89.29 CHRONIC BILATERAL LOW BACK PAIN WITH RIGHT-SIDED SCIATICA: ICD-10-CM

## 2017-11-27 DIAGNOSIS — R26.81 UNSTEADINESS ON FEET: ICD-10-CM

## 2017-11-27 DIAGNOSIS — G89.29 CHRONIC LEFT SHOULDER PAIN: ICD-10-CM

## 2017-11-27 DIAGNOSIS — M25.552 ACUTE PAIN OF LEFT HIP: ICD-10-CM

## 2017-11-27 DIAGNOSIS — M25.512 CHRONIC LEFT SHOULDER PAIN: ICD-10-CM

## 2017-11-27 DIAGNOSIS — M54.41 CHRONIC BILATERAL LOW BACK PAIN WITH BILATERAL SCIATICA: ICD-10-CM

## 2017-11-27 DIAGNOSIS — M53.3 SACRAL PAIN: ICD-10-CM

## 2017-11-27 DIAGNOSIS — M62.81 GENERALIZED MUSCLE WEAKNESS: ICD-10-CM

## 2017-11-27 DIAGNOSIS — M54.41 CHRONIC BILATERAL LOW BACK PAIN WITH RIGHT-SIDED SCIATICA: ICD-10-CM

## 2017-11-27 DIAGNOSIS — M53.86 DECREASED ROM OF LUMBAR SPINE: ICD-10-CM

## 2017-11-27 DIAGNOSIS — M25.612 DECREASED ROM OF LEFT SHOULDER: ICD-10-CM

## 2017-11-28 ENCOUNTER — COMMUNICATION - HEALTHEAST (OUTPATIENT)
Dept: FAMILY MEDICINE | Facility: CLINIC | Age: 77
End: 2017-11-28

## 2017-12-11 ENCOUNTER — OFFICE VISIT - HEALTHEAST (OUTPATIENT)
Dept: FAMILY MEDICINE | Facility: CLINIC | Age: 77
End: 2017-12-11

## 2017-12-11 DIAGNOSIS — M79.673 FOOT PAIN: ICD-10-CM

## 2017-12-11 DIAGNOSIS — I10 HTN (HYPERTENSION): ICD-10-CM

## 2017-12-11 DIAGNOSIS — F41.9 ANXIETY: ICD-10-CM

## 2017-12-11 DIAGNOSIS — R21 RASH: ICD-10-CM

## 2017-12-11 DIAGNOSIS — L84 CALLUS: ICD-10-CM

## 2017-12-11 DIAGNOSIS — L29.9 PRURITUS: ICD-10-CM

## 2017-12-12 ENCOUNTER — OFFICE VISIT - HEALTHEAST (OUTPATIENT)
Dept: CARDIOLOGY | Facility: CLINIC | Age: 77
End: 2017-12-12

## 2017-12-12 ENCOUNTER — COMMUNICATION - HEALTHEAST (OUTPATIENT)
Dept: FAMILY MEDICINE | Facility: CLINIC | Age: 77
End: 2017-12-12

## 2017-12-12 DIAGNOSIS — I10 ESSENTIAL HYPERTENSION: ICD-10-CM

## 2017-12-12 DIAGNOSIS — I95.1 ORTHOSTATIC HYPOTENSION: ICD-10-CM

## 2017-12-12 ASSESSMENT — MIFFLIN-ST. JEOR: SCORE: 1162.15

## 2017-12-19 ENCOUNTER — HOSPITAL ENCOUNTER (OUTPATIENT)
Dept: PHYSICAL MEDICINE AND REHAB | Facility: CLINIC | Age: 77
Discharge: HOME | End: 2017-12-19
Attending: PHYSICIAN ASSISTANT

## 2017-12-19 ENCOUNTER — COMMUNICATION - HEALTHEAST (OUTPATIENT)
Dept: PHYSICAL MEDICINE AND REHAB | Facility: CLINIC | Age: 77
End: 2017-12-19

## 2017-12-19 DIAGNOSIS — M70.70 ISCHIAL BURSITIS: ICD-10-CM

## 2017-12-19 DIAGNOSIS — M53.3 SI (SACROILIAC) JOINT DYSFUNCTION: ICD-10-CM

## 2017-12-21 ENCOUNTER — COMMUNICATION - HEALTHEAST (OUTPATIENT)
Dept: NURSING | Facility: CLINIC | Age: 77
End: 2017-12-21

## 2017-12-26 ENCOUNTER — OFFICE VISIT - HEALTHEAST (OUTPATIENT)
Dept: PODIATRY | Age: 77
End: 2017-12-26

## 2017-12-26 DIAGNOSIS — L60.2 ONYCHAUXIS: ICD-10-CM

## 2017-12-26 DIAGNOSIS — M79.673 PAIN OF FOOT, UNSPECIFIED LATERALITY: ICD-10-CM

## 2017-12-27 ENCOUNTER — RECORDS - HEALTHEAST (OUTPATIENT)
Dept: ADMINISTRATIVE | Facility: OTHER | Age: 77
End: 2017-12-27

## 2018-01-12 ENCOUNTER — COMMUNICATION - HEALTHEAST (OUTPATIENT)
Dept: NURSING | Facility: CLINIC | Age: 78
End: 2018-01-12

## 2018-01-12 ENCOUNTER — OFFICE VISIT - HEALTHEAST (OUTPATIENT)
Dept: CARDIOLOGY | Facility: CLINIC | Age: 78
End: 2018-01-12

## 2018-01-12 DIAGNOSIS — I10 ESSENTIAL HYPERTENSION: ICD-10-CM

## 2018-01-12 ASSESSMENT — MIFFLIN-ST. JEOR: SCORE: 1202.06

## 2018-01-17 ENCOUNTER — COMMUNICATION - HEALTHEAST (OUTPATIENT)
Dept: NURSING | Facility: CLINIC | Age: 78
End: 2018-01-17

## 2018-01-26 ENCOUNTER — COMMUNICATION - HEALTHEAST (OUTPATIENT)
Dept: NURSING | Facility: CLINIC | Age: 78
End: 2018-01-26

## 2018-01-26 ENCOUNTER — COMMUNICATION - HEALTHEAST (OUTPATIENT)
Dept: CARDIOLOGY | Facility: CLINIC | Age: 78
End: 2018-01-26

## 2018-01-26 DIAGNOSIS — I10 HYPERTENSION: ICD-10-CM

## 2018-01-29 ENCOUNTER — AMBULATORY - HEALTHEAST (OUTPATIENT)
Dept: CARDIOLOGY | Facility: CLINIC | Age: 78
End: 2018-01-29

## 2018-01-29 DIAGNOSIS — I10 HYPERTENSION: ICD-10-CM

## 2018-01-29 LAB
ANION GAP SERPL CALCULATED.3IONS-SCNC: 8 MMOL/L (ref 5–18)
BUN SERPL-MCNC: 12 MG/DL (ref 8–28)
CALCIUM SERPL-MCNC: 9.2 MG/DL (ref 8.5–10.5)
CHLORIDE BLD-SCNC: 95 MMOL/L (ref 98–107)
CO2 SERPL-SCNC: 33 MMOL/L (ref 22–31)
CREAT SERPL-MCNC: 0.56 MG/DL (ref 0.6–1.1)
GFR SERPL CREATININE-BSD FRML MDRD: >60 ML/MIN/1.73M2
GLUCOSE BLD-MCNC: 91 MG/DL (ref 70–125)
POTASSIUM BLD-SCNC: 4.3 MMOL/L (ref 3.5–5)
SODIUM SERPL-SCNC: 136 MMOL/L (ref 136–145)

## 2018-01-30 ENCOUNTER — AMBULATORY - HEALTHEAST (OUTPATIENT)
Dept: CARDIOLOGY | Facility: CLINIC | Age: 78
End: 2018-01-30

## 2018-01-30 ENCOUNTER — COMMUNICATION - HEALTHEAST (OUTPATIENT)
Dept: NURSING | Facility: CLINIC | Age: 78
End: 2018-01-30

## 2018-01-30 DIAGNOSIS — I10 HTN (HYPERTENSION): ICD-10-CM

## 2018-02-02 ENCOUNTER — AMBULATORY - HEALTHEAST (OUTPATIENT)
Dept: CARE COORDINATION | Facility: CLINIC | Age: 78
End: 2018-02-02

## 2018-02-06 ENCOUNTER — AMBULATORY - HEALTHEAST (OUTPATIENT)
Dept: CARDIOLOGY | Facility: CLINIC | Age: 78
End: 2018-02-06

## 2018-02-06 DIAGNOSIS — I10 HTN (HYPERTENSION): ICD-10-CM

## 2018-02-06 LAB
ANION GAP SERPL CALCULATED.3IONS-SCNC: 11 MMOL/L (ref 5–18)
BUN SERPL-MCNC: 14 MG/DL (ref 8–28)
CALCIUM SERPL-MCNC: 9.2 MG/DL (ref 8.5–10.5)
CHLORIDE BLD-SCNC: 94 MMOL/L (ref 98–107)
CO2 SERPL-SCNC: 30 MMOL/L (ref 22–31)
CREAT SERPL-MCNC: 0.6 MG/DL (ref 0.6–1.1)
GFR SERPL CREATININE-BSD FRML MDRD: >60 ML/MIN/1.73M2
GLUCOSE BLD-MCNC: 98 MG/DL (ref 70–125)
POTASSIUM BLD-SCNC: 4 MMOL/L (ref 3.5–5)
SODIUM SERPL-SCNC: 135 MMOL/L (ref 136–145)

## 2018-02-15 ENCOUNTER — OFFICE VISIT - HEALTHEAST (OUTPATIENT)
Dept: CARDIOLOGY | Facility: CLINIC | Age: 78
End: 2018-02-15

## 2018-02-15 DIAGNOSIS — I10 ESSENTIAL HYPERTENSION: ICD-10-CM

## 2018-02-15 ASSESSMENT — MIFFLIN-ST. JEOR: SCORE: 1212.04

## 2018-02-16 ENCOUNTER — COMMUNICATION - HEALTHEAST (OUTPATIENT)
Dept: FAMILY MEDICINE | Facility: CLINIC | Age: 78
End: 2018-02-16

## 2018-02-16 DIAGNOSIS — I10 ESSENTIAL HYPERTENSION WITH GOAL BLOOD PRESSURE LESS THAN 140/90: ICD-10-CM

## 2018-02-26 ENCOUNTER — COMMUNICATION - HEALTHEAST (OUTPATIENT)
Dept: NURSING | Facility: CLINIC | Age: 78
End: 2018-02-26

## 2018-02-27 ENCOUNTER — COMMUNICATION - HEALTHEAST (OUTPATIENT)
Dept: ADMINISTRATIVE | Facility: CLINIC | Age: 78
End: 2018-02-27

## 2018-03-05 ENCOUNTER — AMBULATORY - HEALTHEAST (OUTPATIENT)
Dept: PODIATRY | Facility: CLINIC | Age: 78
End: 2018-03-05

## 2018-03-05 ENCOUNTER — AMBULATORY - HEALTHEAST (OUTPATIENT)
Dept: CARDIOLOGY | Facility: CLINIC | Age: 78
End: 2018-03-05

## 2018-03-05 DIAGNOSIS — L60.2 ONYCHAUXIS: ICD-10-CM

## 2018-03-05 DIAGNOSIS — M79.673 PAIN OF FOOT, UNSPECIFIED LATERALITY: ICD-10-CM

## 2018-03-05 DIAGNOSIS — I10 ESSENTIAL HYPERTENSION: ICD-10-CM

## 2018-03-05 LAB
ANION GAP SERPL CALCULATED.3IONS-SCNC: 8 MMOL/L (ref 5–18)
BUN SERPL-MCNC: 13 MG/DL (ref 8–28)
CALCIUM SERPL-MCNC: 8.8 MG/DL (ref 8.5–10.5)
CHLORIDE BLD-SCNC: 96 MMOL/L (ref 98–107)
CO2 SERPL-SCNC: 31 MMOL/L (ref 22–31)
CREAT SERPL-MCNC: 0.6 MG/DL (ref 0.6–1.1)
GFR SERPL CREATININE-BSD FRML MDRD: >60 ML/MIN/1.73M2
GLUCOSE BLD-MCNC: 96 MG/DL (ref 70–125)
POTASSIUM BLD-SCNC: 4.5 MMOL/L (ref 3.5–5)
SODIUM SERPL-SCNC: 135 MMOL/L (ref 136–145)

## 2018-03-11 ENCOUNTER — COMMUNICATION - HEALTHEAST (OUTPATIENT)
Dept: FAMILY MEDICINE | Facility: CLINIC | Age: 78
End: 2018-03-11

## 2018-03-11 DIAGNOSIS — F33.9 EPISODE OF RECURRENT MAJOR DEPRESSIVE DISORDER, UNSPECIFIED DEPRESSION EPISODE SEVERITY (H): ICD-10-CM

## 2018-03-19 ENCOUNTER — COMMUNICATION - HEALTHEAST (OUTPATIENT)
Dept: NURSING | Facility: CLINIC | Age: 78
End: 2018-03-19

## 2018-03-23 ENCOUNTER — OFFICE VISIT - HEALTHEAST (OUTPATIENT)
Dept: FAMILY MEDICINE | Facility: CLINIC | Age: 78
End: 2018-03-23

## 2018-03-23 DIAGNOSIS — N89.8 VAGINAL ITCHING: ICD-10-CM

## 2018-03-23 DIAGNOSIS — L03.90 CELLULITIS: ICD-10-CM

## 2018-03-23 DIAGNOSIS — R00.1 BRADYCARDIA: ICD-10-CM

## 2018-03-23 DIAGNOSIS — F41.9 ANXIETY: ICD-10-CM

## 2018-03-23 DIAGNOSIS — I10 ESSENTIAL HYPERTENSION: ICD-10-CM

## 2018-03-23 DIAGNOSIS — B37.31 YEAST VAGINITIS: ICD-10-CM

## 2018-03-23 LAB
CLUE CELLS: ABNORMAL
TRICHOMONAS, WET PREP: ABNORMAL
YEAST, WET PREP: ABNORMAL

## 2018-03-26 ENCOUNTER — COMMUNICATION - HEALTHEAST (OUTPATIENT)
Dept: FAMILY MEDICINE | Facility: CLINIC | Age: 78
End: 2018-03-26

## 2018-03-30 ENCOUNTER — COMMUNICATION - HEALTHEAST (OUTPATIENT)
Dept: FAMILY MEDICINE | Facility: CLINIC | Age: 78
End: 2018-03-30

## 2018-04-10 ENCOUNTER — OFFICE VISIT - HEALTHEAST (OUTPATIENT)
Dept: FAMILY MEDICINE | Facility: CLINIC | Age: 78
End: 2018-04-10

## 2018-04-10 ENCOUNTER — RECORDS - HEALTHEAST (OUTPATIENT)
Dept: GENERAL RADIOLOGY | Facility: CLINIC | Age: 78
End: 2018-04-10

## 2018-04-10 DIAGNOSIS — L53.9 ERYTHEMA OF FOOT: ICD-10-CM

## 2018-04-10 DIAGNOSIS — M79.673 PAIN IN UNSPECIFIED FOOT: ICD-10-CM

## 2018-04-10 DIAGNOSIS — G89.29 CHRONIC LEFT SI JOINT PAIN: ICD-10-CM

## 2018-04-10 DIAGNOSIS — M79.89 FOOT SWELLING: ICD-10-CM

## 2018-04-10 DIAGNOSIS — M53.3 CHRONIC LEFT SI JOINT PAIN: ICD-10-CM

## 2018-04-10 DIAGNOSIS — M79.673 FOOT PAIN: ICD-10-CM

## 2018-04-10 LAB
C REACTIVE PROTEIN LHE: <0.1 MG/DL (ref 0–0.8)
ERYTHROCYTE [SEDIMENTATION RATE] IN BLOOD BY WESTERGREN METHOD: 23 MM/HR (ref 0–20)
URATE SERPL-MCNC: 3.3 MG/DL (ref 2–7.5)

## 2018-04-11 ENCOUNTER — COMMUNICATION - HEALTHEAST (OUTPATIENT)
Dept: FAMILY MEDICINE | Facility: CLINIC | Age: 78
End: 2018-04-11

## 2018-04-12 ENCOUNTER — HOSPITAL ENCOUNTER (OUTPATIENT)
Dept: PHYSICAL MEDICINE AND REHAB | Facility: CLINIC | Age: 78
Discharge: HOME | End: 2018-04-12
Attending: PHYSICIAN ASSISTANT

## 2018-04-12 ENCOUNTER — COMMUNICATION - HEALTHEAST (OUTPATIENT)
Dept: PHYSICAL MEDICINE AND REHAB | Facility: CLINIC | Age: 78
End: 2018-04-12

## 2018-04-12 DIAGNOSIS — M54.50 LOW BACK PAIN: ICD-10-CM

## 2018-04-12 DIAGNOSIS — M16.9 OA (OSTEOARTHRITIS) OF HIP: ICD-10-CM

## 2018-04-12 DIAGNOSIS — M53.3 SI (SACROILIAC) JOINT DYSFUNCTION: ICD-10-CM

## 2018-04-17 ENCOUNTER — HOSPITAL ENCOUNTER (OUTPATIENT)
Dept: PHYSICAL MEDICINE AND REHAB | Facility: CLINIC | Age: 78
Discharge: HOME | End: 2018-04-17
Attending: PHYSICIAN ASSISTANT

## 2018-04-17 DIAGNOSIS — M53.3 SI (SACROILIAC) JOINT DYSFUNCTION: ICD-10-CM

## 2018-04-18 ENCOUNTER — COMMUNICATION - HEALTHEAST (OUTPATIENT)
Dept: NURSING | Facility: CLINIC | Age: 78
End: 2018-04-18

## 2018-04-30 ENCOUNTER — COMMUNICATION - HEALTHEAST (OUTPATIENT)
Dept: PHYSICAL MEDICINE AND REHAB | Facility: CLINIC | Age: 78
End: 2018-04-30

## 2018-04-30 ENCOUNTER — COMMUNICATION - HEALTHEAST (OUTPATIENT)
Dept: CARDIOLOGY | Facility: CLINIC | Age: 78
End: 2018-04-30

## 2018-05-02 ENCOUNTER — COMMUNICATION - HEALTHEAST (OUTPATIENT)
Dept: NURSING | Facility: CLINIC | Age: 78
End: 2018-05-02

## 2018-05-03 ENCOUNTER — RECORDS - HEALTHEAST (OUTPATIENT)
Dept: LAB | Facility: CLINIC | Age: 78
End: 2018-05-03

## 2018-05-03 ENCOUNTER — OFFICE VISIT - HEALTHEAST (OUTPATIENT)
Dept: GERIATRICS | Facility: CLINIC | Age: 78
End: 2018-05-03

## 2018-05-03 DIAGNOSIS — R53.1 WEAKNESS: ICD-10-CM

## 2018-05-03 DIAGNOSIS — F51.05 INSOMNIA DUE TO ANXIETY AND FEAR: ICD-10-CM

## 2018-05-03 DIAGNOSIS — F40.9 INSOMNIA DUE TO ANXIETY AND FEAR: ICD-10-CM

## 2018-05-03 DIAGNOSIS — I10 ESSENTIAL HYPERTENSION: ICD-10-CM

## 2018-05-03 DIAGNOSIS — F32.1 MAJOR DEPRESSIVE DISORDER, SINGLE EPISODE, MODERATE (H): ICD-10-CM

## 2018-05-03 DIAGNOSIS — I95.1 ORTHOSTATIC HYPOTENSION: ICD-10-CM

## 2018-05-04 LAB
ALBUMIN UR-MCNC: NEGATIVE MG/DL
APPEARANCE UR: ABNORMAL
BACTERIA #/AREA URNS HPF: ABNORMAL HPF
BILIRUB UR QL STRIP: NEGATIVE
COLOR UR AUTO: ABNORMAL
GLUCOSE UR STRIP-MCNC: NEGATIVE MG/DL
HGB UR QL STRIP: NEGATIVE
KETONES UR STRIP-MCNC: NEGATIVE MG/DL
LEUKOCYTE ESTERASE UR QL STRIP: ABNORMAL
NITRATE UR QL: POSITIVE
PH UR STRIP: 7 [PH] (ref 4.5–8)
RBC #/AREA URNS AUTO: ABNORMAL HPF
SP GR UR STRIP: 1.01 (ref 1–1.03)
SQUAMOUS #/AREA URNS AUTO: ABNORMAL LPF
UROBILINOGEN UR STRIP-ACNC: ABNORMAL
WBC #/AREA URNS AUTO: ABNORMAL HPF
WBC CLUMPS #/AREA URNS HPF: PRESENT /[HPF]

## 2018-05-06 LAB — BACTERIA SPEC CULT: ABNORMAL

## 2018-05-07 ENCOUNTER — OFFICE VISIT - HEALTHEAST (OUTPATIENT)
Dept: GERIATRICS | Facility: CLINIC | Age: 78
End: 2018-05-07

## 2018-05-07 DIAGNOSIS — F41.9 ANXIETY: ICD-10-CM

## 2018-05-07 DIAGNOSIS — I10 UNCONTROLLED HYPERTENSION: ICD-10-CM

## 2018-05-07 DIAGNOSIS — R53.1 WEAKNESS: ICD-10-CM

## 2018-05-08 ENCOUNTER — OFFICE VISIT - HEALTHEAST (OUTPATIENT)
Dept: GERIATRICS | Facility: CLINIC | Age: 78
End: 2018-05-08

## 2018-05-08 DIAGNOSIS — F41.9 ANXIETY: ICD-10-CM

## 2018-05-08 DIAGNOSIS — F32.1 MAJOR DEPRESSIVE DISORDER, SINGLE EPISODE, MODERATE (H): ICD-10-CM

## 2018-05-08 DIAGNOSIS — I10 UNCONTROLLED HYPERTENSION: ICD-10-CM

## 2018-05-08 DIAGNOSIS — R53.1 WEAKNESS: ICD-10-CM

## 2018-05-10 ENCOUNTER — OFFICE VISIT - HEALTHEAST (OUTPATIENT)
Dept: GERIATRICS | Facility: CLINIC | Age: 78
End: 2018-05-10

## 2018-05-10 DIAGNOSIS — K58.1 IRRITABLE BOWEL SYNDROME WITH CONSTIPATION: ICD-10-CM

## 2018-05-10 DIAGNOSIS — R53.1 WEAKNESS: ICD-10-CM

## 2018-05-10 DIAGNOSIS — F41.9 ANXIETY: ICD-10-CM

## 2018-05-10 DIAGNOSIS — I95.1 ORTHOSTATIC HYPOTENSION: ICD-10-CM

## 2018-05-14 ENCOUNTER — COMMUNICATION - HEALTHEAST (OUTPATIENT)
Dept: CARDIOLOGY | Facility: CLINIC | Age: 78
End: 2018-05-14

## 2018-05-14 ENCOUNTER — OFFICE VISIT - HEALTHEAST (OUTPATIENT)
Dept: CARDIOLOGY | Facility: CLINIC | Age: 78
End: 2018-05-14

## 2018-05-14 DIAGNOSIS — I10 ESSENTIAL HYPERTENSION: ICD-10-CM

## 2018-05-14 ASSESSMENT — MIFFLIN-ST. JEOR: SCORE: 1189.36

## 2018-05-15 ENCOUNTER — COMMUNICATION - HEALTHEAST (OUTPATIENT)
Dept: NURSING | Facility: CLINIC | Age: 78
End: 2018-05-15

## 2018-05-16 ENCOUNTER — OFFICE VISIT - HEALTHEAST (OUTPATIENT)
Dept: GERIATRICS | Facility: CLINIC | Age: 78
End: 2018-05-16

## 2018-05-16 DIAGNOSIS — F51.01 PRIMARY INSOMNIA: ICD-10-CM

## 2018-05-16 DIAGNOSIS — R60.0 LEG EDEMA: ICD-10-CM

## 2018-05-16 DIAGNOSIS — R53.1 WEAK: ICD-10-CM

## 2018-05-17 ENCOUNTER — OFFICE VISIT - HEALTHEAST (OUTPATIENT)
Dept: GERIATRICS | Facility: CLINIC | Age: 78
End: 2018-05-17

## 2018-05-17 DIAGNOSIS — F51.05 INSOMNIA DUE TO ANXIETY AND FEAR: ICD-10-CM

## 2018-05-17 DIAGNOSIS — I95.1 ORTHOSTATIC HYPOTENSION: ICD-10-CM

## 2018-05-17 DIAGNOSIS — F40.9 INSOMNIA DUE TO ANXIETY AND FEAR: ICD-10-CM

## 2018-05-17 DIAGNOSIS — E03.9 HYPOTHYROIDISM, UNSPECIFIED TYPE: ICD-10-CM

## 2018-05-17 DIAGNOSIS — F32.1 MAJOR DEPRESSIVE DISORDER, SINGLE EPISODE, MODERATE (H): ICD-10-CM

## 2018-05-21 ENCOUNTER — RECORDS - HEALTHEAST (OUTPATIENT)
Dept: LAB | Facility: CLINIC | Age: 78
End: 2018-05-21

## 2018-05-22 ENCOUNTER — HOME CARE/HOSPICE - HEALTHEAST (OUTPATIENT)
Dept: HOME HEALTH SERVICES | Facility: HOME HEALTH | Age: 78
End: 2018-05-22

## 2018-05-22 ENCOUNTER — OFFICE VISIT - HEALTHEAST (OUTPATIENT)
Dept: GERIATRICS | Facility: CLINIC | Age: 78
End: 2018-05-22

## 2018-05-22 DIAGNOSIS — F51.05 INSOMNIA DUE TO ANXIETY AND FEAR: ICD-10-CM

## 2018-05-22 DIAGNOSIS — K58.1 IRRITABLE BOWEL SYNDROME WITH CONSTIPATION: ICD-10-CM

## 2018-05-22 DIAGNOSIS — R53.1 WEAK: ICD-10-CM

## 2018-05-22 DIAGNOSIS — R32 URINARY INCONTINENCE IN FEMALE: ICD-10-CM

## 2018-05-22 DIAGNOSIS — I95.1 ORTHOSTATIC HYPOTENSION: ICD-10-CM

## 2018-05-22 DIAGNOSIS — F41.9 ANXIETY: ICD-10-CM

## 2018-05-22 DIAGNOSIS — F40.9 INSOMNIA DUE TO ANXIETY AND FEAR: ICD-10-CM

## 2018-05-22 DIAGNOSIS — E03.9 HYPOTHYROIDISM, UNSPECIFIED TYPE: ICD-10-CM

## 2018-05-22 DIAGNOSIS — F32.1 MAJOR DEPRESSIVE DISORDER, SINGLE EPISODE, MODERATE (H): ICD-10-CM

## 2018-05-22 DIAGNOSIS — I10 UNCONTROLLED HYPERTENSION: ICD-10-CM

## 2018-05-22 LAB
ALBUMIN UR-MCNC: ABNORMAL MG/DL
APPEARANCE UR: ABNORMAL
BACTERIA #/AREA URNS HPF: ABNORMAL HPF
BILIRUB UR QL STRIP: NEGATIVE
CAOX CRY #/AREA URNS HPF: PRESENT /[HPF]
COLOR UR AUTO: YELLOW
GLUCOSE UR STRIP-MCNC: NEGATIVE MG/DL
HGB UR QL STRIP: NEGATIVE
KETONES UR STRIP-MCNC: NEGATIVE MG/DL
LEUKOCYTE ESTERASE UR QL STRIP: ABNORMAL
MUCOUS THREADS #/AREA URNS LPF: ABNORMAL LPF
NITRATE UR QL: POSITIVE
PH UR STRIP: 6 [PH] (ref 4.5–8)
RBC #/AREA URNS AUTO: ABNORMAL HPF
SP GR UR STRIP: 1.02 (ref 1–1.03)
SQUAMOUS #/AREA URNS AUTO: ABNORMAL LPF
UROBILINOGEN UR STRIP-ACNC: ABNORMAL
WBC #/AREA URNS AUTO: >100 HPF
WBC CLUMPS #/AREA URNS HPF: PRESENT /[HPF]

## 2018-05-23 ENCOUNTER — AMBULATORY - HEALTHEAST (OUTPATIENT)
Dept: GERIATRICS | Facility: CLINIC | Age: 78
End: 2018-05-23

## 2018-05-23 ENCOUNTER — COMMUNICATION - HEALTHEAST (OUTPATIENT)
Dept: GERIATRICS | Facility: CLINIC | Age: 78
End: 2018-05-23

## 2018-05-24 ENCOUNTER — HOME CARE/HOSPICE - HEALTHEAST (OUTPATIENT)
Dept: HOME HEALTH SERVICES | Facility: HOME HEALTH | Age: 78
End: 2018-05-24

## 2018-05-24 ENCOUNTER — COMMUNICATION - HEALTHEAST (OUTPATIENT)
Dept: HOME HEALTH SERVICES | Facility: HOME HEALTH | Age: 78
End: 2018-05-24

## 2018-05-24 ENCOUNTER — COMMUNICATION - HEALTHEAST (OUTPATIENT)
Dept: SCHEDULING | Facility: CLINIC | Age: 78
End: 2018-05-24

## 2018-05-24 DIAGNOSIS — F41.9 ANXIETY: ICD-10-CM

## 2018-05-24 LAB — BACTERIA SPEC CULT: ABNORMAL

## 2018-05-25 ENCOUNTER — COMMUNICATION - HEALTHEAST (OUTPATIENT)
Dept: FAMILY MEDICINE | Facility: CLINIC | Age: 78
End: 2018-05-25

## 2018-05-25 ENCOUNTER — HOME CARE/HOSPICE - HEALTHEAST (OUTPATIENT)
Dept: HOME HEALTH SERVICES | Facility: HOME HEALTH | Age: 78
End: 2018-05-25

## 2018-05-27 ENCOUNTER — HOME CARE/HOSPICE - HEALTHEAST (OUTPATIENT)
Dept: HOME HEALTH SERVICES | Facility: HOME HEALTH | Age: 78
End: 2018-05-27

## 2018-05-28 ENCOUNTER — COMMUNICATION - HEALTHEAST (OUTPATIENT)
Dept: HOME HEALTH SERVICES | Facility: HOME HEALTH | Age: 78
End: 2018-05-28

## 2018-05-28 ENCOUNTER — HOME CARE/HOSPICE - HEALTHEAST (OUTPATIENT)
Dept: HOME HEALTH SERVICES | Facility: HOME HEALTH | Age: 78
End: 2018-05-28

## 2018-05-29 ENCOUNTER — AMBULATORY - HEALTHEAST (OUTPATIENT)
Dept: SCHEDULING | Facility: CLINIC | Age: 78
End: 2018-05-29

## 2018-05-29 ENCOUNTER — OFFICE VISIT - HEALTHEAST (OUTPATIENT)
Dept: FAMILY MEDICINE | Facility: CLINIC | Age: 78
End: 2018-05-29

## 2018-05-29 ENCOUNTER — HOME CARE/HOSPICE - HEALTHEAST (OUTPATIENT)
Dept: HOME HEALTH SERVICES | Facility: HOME HEALTH | Age: 78
End: 2018-05-29

## 2018-05-29 DIAGNOSIS — Z87.440 HISTORY OF UTI: ICD-10-CM

## 2018-05-29 DIAGNOSIS — F41.9 ANXIETY: ICD-10-CM

## 2018-05-29 DIAGNOSIS — Z78.0 POST-MENOPAUSAL: ICD-10-CM

## 2018-05-29 DIAGNOSIS — R09.89 LABILE HYPERTENSION: ICD-10-CM

## 2018-05-29 LAB
ALBUMIN UR-MCNC: NEGATIVE MG/DL
APPEARANCE UR: CLEAR
BILIRUB UR QL STRIP: NEGATIVE
COLOR UR AUTO: YELLOW
GLUCOSE UR STRIP-MCNC: NEGATIVE MG/DL
HGB UR QL STRIP: NEGATIVE
KETONES UR STRIP-MCNC: NEGATIVE MG/DL
LEUKOCYTE ESTERASE UR QL STRIP: NEGATIVE
NITRATE UR QL: NEGATIVE
PH UR STRIP: 6.5 [PH] (ref 5–8)
SP GR UR STRIP: 1.01 (ref 1–1.03)
UROBILINOGEN UR STRIP-ACNC: NORMAL

## 2018-05-30 ENCOUNTER — HOME CARE/HOSPICE - HEALTHEAST (OUTPATIENT)
Dept: HOME HEALTH SERVICES | Facility: HOME HEALTH | Age: 78
End: 2018-05-30

## 2018-05-31 ENCOUNTER — HOME CARE/HOSPICE - HEALTHEAST (OUTPATIENT)
Dept: HOME HEALTH SERVICES | Facility: HOME HEALTH | Age: 78
End: 2018-05-31

## 2018-06-01 ENCOUNTER — HOME CARE/HOSPICE - HEALTHEAST (OUTPATIENT)
Dept: HOME HEALTH SERVICES | Facility: HOME HEALTH | Age: 78
End: 2018-06-01

## 2018-06-04 ENCOUNTER — HOME CARE/HOSPICE - HEALTHEAST (OUTPATIENT)
Dept: HOME HEALTH SERVICES | Facility: HOME HEALTH | Age: 78
End: 2018-06-04

## 2018-06-04 ENCOUNTER — COMMUNICATION - HEALTHEAST (OUTPATIENT)
Dept: HOME HEALTH SERVICES | Facility: HOME HEALTH | Age: 78
End: 2018-06-04

## 2018-06-05 ENCOUNTER — HOME CARE/HOSPICE - HEALTHEAST (OUTPATIENT)
Dept: HOME HEALTH SERVICES | Facility: HOME HEALTH | Age: 78
End: 2018-06-05

## 2018-06-06 ENCOUNTER — HOME CARE/HOSPICE - HEALTHEAST (OUTPATIENT)
Dept: HOME HEALTH SERVICES | Facility: HOME HEALTH | Age: 78
End: 2018-06-06

## 2018-06-06 ENCOUNTER — COMMUNICATION - HEALTHEAST (OUTPATIENT)
Dept: NURSING | Facility: CLINIC | Age: 78
End: 2018-06-06

## 2018-06-06 ENCOUNTER — COMMUNICATION - HEALTHEAST (OUTPATIENT)
Dept: HOME HEALTH SERVICES | Facility: HOME HEALTH | Age: 78
End: 2018-06-06

## 2018-06-07 ENCOUNTER — COMMUNICATION - HEALTHEAST (OUTPATIENT)
Dept: HOME HEALTH SERVICES | Facility: HOME HEALTH | Age: 78
End: 2018-06-07

## 2018-06-07 ENCOUNTER — AMBULATORY - HEALTHEAST (OUTPATIENT)
Dept: FAMILY MEDICINE | Facility: CLINIC | Age: 78
End: 2018-06-07

## 2018-06-07 ENCOUNTER — HOME CARE/HOSPICE - HEALTHEAST (OUTPATIENT)
Dept: HOME HEALTH SERVICES | Facility: HOME HEALTH | Age: 78
End: 2018-06-07

## 2018-06-07 DIAGNOSIS — R53.1 GENERALIZED WEAKNESS: ICD-10-CM

## 2018-06-08 ENCOUNTER — HOME CARE/HOSPICE - HEALTHEAST (OUTPATIENT)
Dept: HOME HEALTH SERVICES | Facility: HOME HEALTH | Age: 78
End: 2018-06-08

## 2018-06-08 ENCOUNTER — COMMUNICATION - HEALTHEAST (OUTPATIENT)
Dept: FAMILY MEDICINE | Facility: CLINIC | Age: 78
End: 2018-06-08

## 2018-06-08 DIAGNOSIS — K21.9 ESOPHAGEAL REFLUX: ICD-10-CM

## 2018-06-11 ENCOUNTER — COMMUNICATION - HEALTHEAST (OUTPATIENT)
Dept: FAMILY MEDICINE | Facility: CLINIC | Age: 78
End: 2018-06-11

## 2018-06-12 ENCOUNTER — HOME CARE/HOSPICE - HEALTHEAST (OUTPATIENT)
Dept: HOME HEALTH SERVICES | Facility: HOME HEALTH | Age: 78
End: 2018-06-12

## 2018-06-13 ENCOUNTER — AMBULATORY - HEALTHEAST (OUTPATIENT)
Dept: FAMILY MEDICINE | Facility: CLINIC | Age: 78
End: 2018-06-13

## 2018-06-13 ENCOUNTER — HOME CARE/HOSPICE - HEALTHEAST (OUTPATIENT)
Dept: HOME HEALTH SERVICES | Facility: HOME HEALTH | Age: 78
End: 2018-06-13

## 2018-06-13 ENCOUNTER — COMMUNICATION - HEALTHEAST (OUTPATIENT)
Dept: FAMILY MEDICINE | Facility: CLINIC | Age: 78
End: 2018-06-13

## 2018-06-13 DIAGNOSIS — M81.0 AGE-RELATED OSTEOPOROSIS WITHOUT CURRENT PATHOLOGICAL FRACTURE: ICD-10-CM

## 2018-06-14 ENCOUNTER — HOME CARE/HOSPICE - HEALTHEAST (OUTPATIENT)
Dept: HOME HEALTH SERVICES | Facility: HOME HEALTH | Age: 78
End: 2018-06-14

## 2018-06-15 ENCOUNTER — HOME CARE/HOSPICE - HEALTHEAST (OUTPATIENT)
Dept: HOME HEALTH SERVICES | Facility: HOME HEALTH | Age: 78
End: 2018-06-15

## 2018-06-19 ENCOUNTER — HOME CARE/HOSPICE - HEALTHEAST (OUTPATIENT)
Dept: HOME HEALTH SERVICES | Facility: HOME HEALTH | Age: 78
End: 2018-06-19

## 2018-06-20 ENCOUNTER — HOME CARE/HOSPICE - HEALTHEAST (OUTPATIENT)
Dept: HOME HEALTH SERVICES | Facility: HOME HEALTH | Age: 78
End: 2018-06-20

## 2018-06-21 ENCOUNTER — HOME CARE/HOSPICE - HEALTHEAST (OUTPATIENT)
Dept: HOME HEALTH SERVICES | Facility: HOME HEALTH | Age: 78
End: 2018-06-21

## 2018-06-21 ENCOUNTER — COMMUNICATION - HEALTHEAST (OUTPATIENT)
Dept: CARDIOLOGY | Facility: CLINIC | Age: 78
End: 2018-06-21

## 2018-06-22 ENCOUNTER — OFFICE VISIT - HEALTHEAST (OUTPATIENT)
Dept: CARDIOLOGY | Facility: CLINIC | Age: 78
End: 2018-06-22

## 2018-06-22 ENCOUNTER — HOME CARE/HOSPICE - HEALTHEAST (OUTPATIENT)
Dept: HOME HEALTH SERVICES | Facility: HOME HEALTH | Age: 78
End: 2018-06-22

## 2018-06-22 DIAGNOSIS — I10 ESSENTIAL HYPERTENSION: ICD-10-CM

## 2018-06-22 DIAGNOSIS — I95.1 ORTHOSTATIC HYPOTENSION DYSAUTONOMIC SYNDROME: ICD-10-CM

## 2018-06-22 ASSESSMENT — MIFFLIN-ST. JEOR: SCORE: 1189.36

## 2018-06-25 ENCOUNTER — OFFICE VISIT - HEALTHEAST (OUTPATIENT)
Dept: FAMILY MEDICINE | Facility: CLINIC | Age: 78
End: 2018-06-25

## 2018-06-25 DIAGNOSIS — I10 ESSENTIAL HYPERTENSION: ICD-10-CM

## 2018-06-25 DIAGNOSIS — K21.9 ESOPHAGEAL REFLUX: ICD-10-CM

## 2018-06-25 DIAGNOSIS — R12 HEARTBURN: ICD-10-CM

## 2018-06-25 DIAGNOSIS — N89.8 VAGINAL ITCHING: ICD-10-CM

## 2018-06-26 ENCOUNTER — HOME CARE/HOSPICE - HEALTHEAST (OUTPATIENT)
Dept: HOME HEALTH SERVICES | Facility: HOME HEALTH | Age: 78
End: 2018-06-26

## 2018-06-27 ENCOUNTER — COMMUNICATION - HEALTHEAST (OUTPATIENT)
Dept: FAMILY MEDICINE | Facility: CLINIC | Age: 78
End: 2018-06-27

## 2018-06-28 ENCOUNTER — HOME CARE/HOSPICE - HEALTHEAST (OUTPATIENT)
Dept: HOME HEALTH SERVICES | Facility: HOME HEALTH | Age: 78
End: 2018-06-28

## 2018-07-01 ENCOUNTER — COMMUNICATION - HEALTHEAST (OUTPATIENT)
Dept: HOME HEALTH SERVICES | Facility: HOME HEALTH | Age: 78
End: 2018-07-01

## 2018-07-01 DIAGNOSIS — I95.1 ORTHOSTATIC HYPOTENSION: ICD-10-CM

## 2018-07-03 ENCOUNTER — HOME CARE/HOSPICE - HEALTHEAST (OUTPATIENT)
Dept: HOME HEALTH SERVICES | Facility: HOME HEALTH | Age: 78
End: 2018-07-03

## 2018-07-05 ENCOUNTER — TRANSFERRED RECORDS (OUTPATIENT)
Dept: HEALTH INFORMATION MANAGEMENT | Facility: CLINIC | Age: 78
End: 2018-07-05

## 2018-07-08 ENCOUNTER — TRANSFERRED RECORDS (OUTPATIENT)
Dept: HEALTH INFORMATION MANAGEMENT | Facility: CLINIC | Age: 78
End: 2018-07-08

## 2018-07-08 ENCOUNTER — HOME CARE/HOSPICE - HEALTHEAST (OUTPATIENT)
Dept: HOME HEALTH SERVICES | Facility: HOME HEALTH | Age: 78
End: 2018-07-08

## 2018-07-10 ENCOUNTER — COMMUNICATION - HEALTHEAST (OUTPATIENT)
Dept: NURSING | Facility: CLINIC | Age: 78
End: 2018-07-10

## 2018-07-11 ENCOUNTER — COMMUNICATION - HEALTHEAST (OUTPATIENT)
Dept: CARDIOLOGY | Facility: CLINIC | Age: 78
End: 2018-07-11

## 2018-07-16 ENCOUNTER — COMMUNICATION - HEALTHEAST (OUTPATIENT)
Dept: FAMILY MEDICINE | Facility: CLINIC | Age: 78
End: 2018-07-16

## 2018-07-17 ENCOUNTER — COMMUNICATION - HEALTHEAST (OUTPATIENT)
Dept: NURSING | Facility: CLINIC | Age: 78
End: 2018-07-17

## 2018-07-18 ENCOUNTER — RECORDS - HEALTHEAST (OUTPATIENT)
Dept: LAB | Facility: CLINIC | Age: 78
End: 2018-07-18

## 2018-07-18 ENCOUNTER — OFFICE VISIT - HEALTHEAST (OUTPATIENT)
Dept: GERIATRICS | Facility: CLINIC | Age: 78
End: 2018-07-18

## 2018-07-18 ENCOUNTER — AMBULATORY - HEALTHEAST (OUTPATIENT)
Dept: ADMINISTRATIVE | Facility: CLINIC | Age: 78
End: 2018-07-18

## 2018-07-18 DIAGNOSIS — R53.1 WEAKNESS: ICD-10-CM

## 2018-07-18 DIAGNOSIS — R29.898 WEAKNESS OF BOTH LOWER EXTREMITIES: ICD-10-CM

## 2018-07-19 ENCOUNTER — OFFICE VISIT - HEALTHEAST (OUTPATIENT)
Dept: GERIATRICS | Facility: CLINIC | Age: 78
End: 2018-07-19

## 2018-07-19 DIAGNOSIS — I95.1 ORTHOSTATIC HYPOTENSION: ICD-10-CM

## 2018-07-19 DIAGNOSIS — F32.1 MAJOR DEPRESSIVE DISORDER, SINGLE EPISODE, MODERATE (H): ICD-10-CM

## 2018-07-19 DIAGNOSIS — R29.898 WEAKNESS OF BOTH LOWER EXTREMITIES: ICD-10-CM

## 2018-07-19 DIAGNOSIS — F41.9 ANXIETY: ICD-10-CM

## 2018-07-19 DIAGNOSIS — F51.05 INSOMNIA DUE TO ANXIETY AND FEAR: ICD-10-CM

## 2018-07-19 DIAGNOSIS — F40.9 INSOMNIA DUE TO ANXIETY AND FEAR: ICD-10-CM

## 2018-07-19 LAB
ANION GAP SERPL CALCULATED.3IONS-SCNC: 7 MMOL/L (ref 5–18)
BUN SERPL-MCNC: 10 MG/DL (ref 8–28)
C REACTIVE PROTEIN LHE: 0.3 MG/DL (ref 0–0.8)
CALCIUM SERPL-MCNC: 9.4 MG/DL (ref 8.5–10.5)
CHLORIDE BLD-SCNC: 99 MMOL/L (ref 98–107)
CO2 SERPL-SCNC: 30 MMOL/L (ref 22–31)
CREAT SERPL-MCNC: 0.69 MG/DL (ref 0.6–1.1)
ERYTHROCYTE [DISTWIDTH] IN BLOOD BY AUTOMATED COUNT: 12 % (ref 11–14.5)
ERYTHROCYTE [SEDIMENTATION RATE] IN BLOOD BY WESTERGREN METHOD: 25 MM/HR (ref 0–20)
GFR SERPL CREATININE-BSD FRML MDRD: >60 ML/MIN/1.73M2
GLUCOSE BLD-MCNC: 71 MG/DL (ref 70–125)
HCT VFR BLD AUTO: 42.1 % (ref 35–47)
HGB BLD-MCNC: 13.9 G/DL (ref 12–16)
MAGNESIUM SERPL-MCNC: 2.4 MG/DL (ref 1.8–2.6)
MCH RBC QN AUTO: 32.5 PG (ref 27–34)
MCHC RBC AUTO-ENTMCNC: 33 G/DL (ref 32–36)
MCV RBC AUTO: 98 FL (ref 80–100)
PLATELET # BLD AUTO: 243 THOU/UL (ref 140–440)
PMV BLD AUTO: 9.9 FL (ref 8.5–12.5)
POTASSIUM BLD-SCNC: 3.6 MMOL/L (ref 3.5–5)
RBC # BLD AUTO: 4.28 MILL/UL (ref 3.8–5.4)
SODIUM SERPL-SCNC: 136 MMOL/L (ref 136–145)
TSH SERPL DL<=0.005 MIU/L-ACNC: 5.68 UIU/ML (ref 0.3–5)
VIT B12 SERPL-MCNC: 1023 PG/ML (ref 213–816)
WBC: 8.2 THOU/UL (ref 4–11)

## 2018-07-20 ENCOUNTER — OFFICE VISIT - HEALTHEAST (OUTPATIENT)
Dept: GERIATRICS | Facility: CLINIC | Age: 78
End: 2018-07-20

## 2018-07-20 DIAGNOSIS — I10 ESSENTIAL HYPERTENSION: ICD-10-CM

## 2018-07-20 LAB
25(OH)D3 SERPL-MCNC: 37.4 NG/ML (ref 30–80)
HBA1C MFR BLD: 5.6 % (ref 4.2–6.1)

## 2018-07-23 ENCOUNTER — OFFICE VISIT - HEALTHEAST (OUTPATIENT)
Dept: GERIATRICS | Facility: CLINIC | Age: 78
End: 2018-07-23

## 2018-07-23 DIAGNOSIS — I95.1 ORTHOSTATIC HYPOTENSION DYSAUTONOMIC SYNDROME: ICD-10-CM

## 2018-07-23 LAB — ANA SER QL: 3.2 U

## 2018-07-24 ENCOUNTER — OFFICE VISIT - HEALTHEAST (OUTPATIENT)
Dept: GERIATRICS | Facility: CLINIC | Age: 78
End: 2018-07-24

## 2018-07-24 DIAGNOSIS — F40.9 INSOMNIA DUE TO ANXIETY AND FEAR: ICD-10-CM

## 2018-07-24 DIAGNOSIS — I95.1 ORTHOSTATIC HYPOTENSION DYSAUTONOMIC SYNDROME: ICD-10-CM

## 2018-07-24 DIAGNOSIS — F51.05 INSOMNIA DUE TO ANXIETY AND FEAR: ICD-10-CM

## 2018-07-24 DIAGNOSIS — F32.1 MAJOR DEPRESSIVE DISORDER, SINGLE EPISODE, MODERATE (H): ICD-10-CM

## 2018-07-24 DIAGNOSIS — F41.9 ANXIETY: ICD-10-CM

## 2018-07-24 DIAGNOSIS — R29.898 WEAKNESS OF BOTH LOWER EXTREMITIES: ICD-10-CM

## 2018-07-25 ENCOUNTER — OFFICE VISIT - HEALTHEAST (OUTPATIENT)
Dept: GERIATRICS | Facility: CLINIC | Age: 78
End: 2018-07-25

## 2018-07-25 DIAGNOSIS — K59.04 CHRONIC IDIOPATHIC CONSTIPATION: ICD-10-CM

## 2018-07-25 DIAGNOSIS — R10.13 EPIGASTRIC PAIN: ICD-10-CM

## 2018-07-26 ENCOUNTER — OFFICE VISIT - HEALTHEAST (OUTPATIENT)
Dept: GERIATRICS | Facility: CLINIC | Age: 78
End: 2018-07-26

## 2018-07-26 DIAGNOSIS — R29.898 WEAKNESS OF BOTH LOWER EXTREMITIES: ICD-10-CM

## 2018-07-26 DIAGNOSIS — K59.04 CHRONIC IDIOPATHIC CONSTIPATION: ICD-10-CM

## 2018-07-26 DIAGNOSIS — I95.1 ORTHOSTATIC HYPOTENSION DYSAUTONOMIC SYNDROME: ICD-10-CM

## 2018-07-26 DIAGNOSIS — F41.9 ANXIETY: ICD-10-CM

## 2018-07-31 ENCOUNTER — OFFICE VISIT - HEALTHEAST (OUTPATIENT)
Dept: GERIATRICS | Facility: CLINIC | Age: 78
End: 2018-07-31

## 2018-07-31 DIAGNOSIS — F51.05 INSOMNIA DUE TO ANXIETY AND FEAR: ICD-10-CM

## 2018-07-31 DIAGNOSIS — F32.1 MAJOR DEPRESSIVE DISORDER, SINGLE EPISODE, MODERATE (H): ICD-10-CM

## 2018-07-31 DIAGNOSIS — R29.898 WEAKNESS OF BOTH LOWER EXTREMITIES: ICD-10-CM

## 2018-07-31 DIAGNOSIS — I95.1 ORTHOSTATIC HYPOTENSION DYSAUTONOMIC SYNDROME: ICD-10-CM

## 2018-07-31 DIAGNOSIS — F40.9 INSOMNIA DUE TO ANXIETY AND FEAR: ICD-10-CM

## 2018-08-02 ENCOUNTER — OFFICE VISIT - HEALTHEAST (OUTPATIENT)
Dept: GERIATRICS | Facility: CLINIC | Age: 78
End: 2018-08-02

## 2018-08-02 DIAGNOSIS — F41.9 ANXIETY: ICD-10-CM

## 2018-08-02 DIAGNOSIS — R29.898 WEAKNESS OF BOTH LOWER EXTREMITIES: ICD-10-CM

## 2018-08-02 DIAGNOSIS — I95.1 ORTHOSTATIC HYPOTENSION DYSAUTONOMIC SYNDROME: ICD-10-CM

## 2018-08-02 DIAGNOSIS — F32.1 MAJOR DEPRESSIVE DISORDER, SINGLE EPISODE, MODERATE (H): ICD-10-CM

## 2018-08-07 ENCOUNTER — OFFICE VISIT - HEALTHEAST (OUTPATIENT)
Dept: GERIATRICS | Facility: CLINIC | Age: 78
End: 2018-08-07

## 2018-08-07 DIAGNOSIS — F41.9 ANXIETY: ICD-10-CM

## 2018-08-07 DIAGNOSIS — F32.1 MAJOR DEPRESSIVE DISORDER, SINGLE EPISODE, MODERATE (H): ICD-10-CM

## 2018-08-07 DIAGNOSIS — I95.1 ORTHOSTATIC HYPOTENSION DYSAUTONOMIC SYNDROME: ICD-10-CM

## 2018-08-07 DIAGNOSIS — R29.898 WEAKNESS OF BOTH LOWER EXTREMITIES: ICD-10-CM

## 2018-08-08 ENCOUNTER — OFFICE VISIT - HEALTHEAST (OUTPATIENT)
Dept: CARDIOLOGY | Facility: CLINIC | Age: 78
End: 2018-08-08

## 2018-08-08 ENCOUNTER — OFFICE VISIT - HEALTHEAST (OUTPATIENT)
Dept: GERIATRICS | Facility: CLINIC | Age: 78
End: 2018-08-08

## 2018-08-08 DIAGNOSIS — I10 ESSENTIAL HYPERTENSION: ICD-10-CM

## 2018-08-08 DIAGNOSIS — R10.13 EPIGASTRIC PAIN: ICD-10-CM

## 2018-08-08 DIAGNOSIS — R29.898 WEAKNESS OF BOTH LOWER EXTREMITIES: ICD-10-CM

## 2018-08-08 DIAGNOSIS — I95.1 ORTHOSTATIC HYPOTENSION DYSAUTONOMIC SYNDROME: ICD-10-CM

## 2018-08-08 DIAGNOSIS — F32.1 MAJOR DEPRESSIVE DISORDER, SINGLE EPISODE, MODERATE (H): ICD-10-CM

## 2018-08-08 DIAGNOSIS — F43.22 ADJUSTMENT DISORDER WITH ANXIOUS MOOD: ICD-10-CM

## 2018-08-08 ASSESSMENT — MIFFLIN-ST. JEOR: SCORE: 1184.83

## 2018-08-09 ENCOUNTER — HOME CARE/HOSPICE - HEALTHEAST (OUTPATIENT)
Dept: HOME HEALTH SERVICES | Facility: HOME HEALTH | Age: 78
End: 2018-08-09

## 2018-08-10 ENCOUNTER — AMBULATORY - HEALTHEAST (OUTPATIENT)
Dept: GERIATRICS | Facility: CLINIC | Age: 78
End: 2018-08-10

## 2018-08-10 ENCOUNTER — COMMUNICATION - HEALTHEAST (OUTPATIENT)
Dept: GERIATRICS | Facility: CLINIC | Age: 78
End: 2018-08-10

## 2018-08-11 ENCOUNTER — HOME CARE/HOSPICE - HEALTHEAST (OUTPATIENT)
Dept: HOME HEALTH SERVICES | Facility: HOME HEALTH | Age: 78
End: 2018-08-11

## 2018-08-11 ASSESSMENT — MIFFLIN-ST. JEOR: SCORE: 1161.01

## 2018-08-12 ENCOUNTER — COMMUNICATION - HEALTHEAST (OUTPATIENT)
Dept: HOME HEALTH SERVICES | Facility: HOME HEALTH | Age: 78
End: 2018-08-12

## 2018-08-12 ENCOUNTER — HOME CARE/HOSPICE - HEALTHEAST (OUTPATIENT)
Dept: HOME HEALTH SERVICES | Facility: HOME HEALTH | Age: 78
End: 2018-08-12

## 2018-08-13 ENCOUNTER — RECORDS - HEALTHEAST (OUTPATIENT)
Dept: ADMINISTRATIVE | Facility: OTHER | Age: 78
End: 2018-08-13

## 2018-08-14 ENCOUNTER — HOME CARE/HOSPICE - HEALTHEAST (OUTPATIENT)
Dept: HOME HEALTH SERVICES | Facility: HOME HEALTH | Age: 78
End: 2018-08-14

## 2018-08-14 ENCOUNTER — COMMUNICATION - HEALTHEAST (OUTPATIENT)
Dept: FAMILY MEDICINE | Facility: CLINIC | Age: 78
End: 2018-08-14

## 2018-08-15 ENCOUNTER — HOME CARE/HOSPICE - HEALTHEAST (OUTPATIENT)
Dept: HOME HEALTH SERVICES | Facility: HOME HEALTH | Age: 78
End: 2018-08-15

## 2018-08-15 ENCOUNTER — COMMUNICATION - HEALTHEAST (OUTPATIENT)
Dept: CARDIOLOGY | Facility: CLINIC | Age: 78
End: 2018-08-15

## 2018-08-16 ENCOUNTER — HOME CARE/HOSPICE - HEALTHEAST (OUTPATIENT)
Dept: HOME HEALTH SERVICES | Facility: HOME HEALTH | Age: 78
End: 2018-08-16

## 2018-08-16 ENCOUNTER — COMMUNICATION - HEALTHEAST (OUTPATIENT)
Dept: HOME HEALTH SERVICES | Facility: HOME HEALTH | Age: 78
End: 2018-08-16

## 2018-08-17 ENCOUNTER — HOME CARE/HOSPICE - HEALTHEAST (OUTPATIENT)
Dept: HOME HEALTH SERVICES | Facility: HOME HEALTH | Age: 78
End: 2018-08-17

## 2018-08-17 ENCOUNTER — COMMUNICATION - HEALTHEAST (OUTPATIENT)
Dept: ADMINISTRATIVE | Facility: CLINIC | Age: 78
End: 2018-08-17

## 2018-08-17 ENCOUNTER — OFFICE VISIT - HEALTHEAST (OUTPATIENT)
Dept: FAMILY MEDICINE | Facility: CLINIC | Age: 78
End: 2018-08-17

## 2018-08-17 DIAGNOSIS — I10 ESSENTIAL HYPERTENSION: ICD-10-CM

## 2018-08-17 DIAGNOSIS — R01.1 HEART MURMUR: ICD-10-CM

## 2018-08-17 DIAGNOSIS — R10.13 ABDOMINAL PAIN, EPIGASTRIC: ICD-10-CM

## 2018-08-17 DIAGNOSIS — R21 PERINEAL RASH IN FEMALE: ICD-10-CM

## 2018-08-17 DIAGNOSIS — K52.9 FREQUENT STOOLS: ICD-10-CM

## 2018-08-20 ENCOUNTER — HOME CARE/HOSPICE - HEALTHEAST (OUTPATIENT)
Dept: HOME HEALTH SERVICES | Facility: HOME HEALTH | Age: 78
End: 2018-08-20

## 2018-08-20 ENCOUNTER — COMMUNICATION - HEALTHEAST (OUTPATIENT)
Dept: FAMILY MEDICINE | Facility: CLINIC | Age: 78
End: 2018-08-20

## 2018-08-20 DIAGNOSIS — R53.1 WEAKNESS: ICD-10-CM

## 2018-08-21 ENCOUNTER — TRANSFERRED RECORDS (OUTPATIENT)
Dept: HEALTH INFORMATION MANAGEMENT | Facility: CLINIC | Age: 78
End: 2018-08-21

## 2018-08-21 ENCOUNTER — RECORDS - HEALTHEAST (OUTPATIENT)
Dept: ADMINISTRATIVE | Facility: OTHER | Age: 78
End: 2018-08-21

## 2018-08-21 ENCOUNTER — MEDICAL CORRESPONDENCE (OUTPATIENT)
Dept: HEALTH INFORMATION MANAGEMENT | Facility: CLINIC | Age: 78
End: 2018-08-21

## 2018-08-22 ENCOUNTER — HOME CARE/HOSPICE - HEALTHEAST (OUTPATIENT)
Dept: HOME HEALTH SERVICES | Facility: HOME HEALTH | Age: 78
End: 2018-08-22

## 2018-08-23 ENCOUNTER — HOME CARE/HOSPICE - HEALTHEAST (OUTPATIENT)
Dept: HOME HEALTH SERVICES | Facility: HOME HEALTH | Age: 78
End: 2018-08-23

## 2018-08-24 ENCOUNTER — HOME CARE/HOSPICE - HEALTHEAST (OUTPATIENT)
Dept: HOME HEALTH SERVICES | Facility: HOME HEALTH | Age: 78
End: 2018-08-24

## 2018-08-24 ENCOUNTER — RECORDS - HEALTHEAST (OUTPATIENT)
Dept: LAB | Facility: CLINIC | Age: 78
End: 2018-08-24

## 2018-08-24 ENCOUNTER — COMMUNICATION - HEALTHEAST (OUTPATIENT)
Dept: HOME HEALTH SERVICES | Facility: HOME HEALTH | Age: 78
End: 2018-08-24

## 2018-08-24 DIAGNOSIS — R82.90 ABNORMAL URINE: ICD-10-CM

## 2018-08-24 LAB
ALBUMIN UR-MCNC: ABNORMAL MG/DL
APPEARANCE UR: ABNORMAL
BACTERIA #/AREA URNS HPF: ABNORMAL HPF
BILIRUB UR QL STRIP: NEGATIVE
COLOR UR AUTO: YELLOW
GLUCOSE UR STRIP-MCNC: NEGATIVE MG/DL
HGB UR QL STRIP: NEGATIVE
KETONES UR STRIP-MCNC: ABNORMAL MG/DL
LEUKOCYTE ESTERASE UR QL STRIP: ABNORMAL
MUCOUS THREADS #/AREA URNS LPF: ABNORMAL LPF
NITRATE UR QL: NEGATIVE
PH UR STRIP: 6 [PH] (ref 4.5–8)
RBC #/AREA URNS AUTO: ABNORMAL HPF
SP GR UR STRIP: 1.03 (ref 1–1.03)
SQUAMOUS #/AREA URNS AUTO: >100 LPF
UROBILINOGEN UR STRIP-ACNC: ABNORMAL
WBC #/AREA URNS AUTO: >100 HPF
WBC CLUMPS #/AREA URNS HPF: PRESENT /[HPF]

## 2018-08-26 ENCOUNTER — HOME CARE/HOSPICE - HEALTHEAST (OUTPATIENT)
Dept: HOME HEALTH SERVICES | Facility: HOME HEALTH | Age: 78
End: 2018-08-26

## 2018-08-27 ENCOUNTER — HOME CARE/HOSPICE - HEALTHEAST (OUTPATIENT)
Dept: HOME HEALTH SERVICES | Facility: HOME HEALTH | Age: 78
End: 2018-08-27

## 2018-08-29 ENCOUNTER — HOME CARE/HOSPICE - HEALTHEAST (OUTPATIENT)
Dept: HOME HEALTH SERVICES | Facility: HOME HEALTH | Age: 78
End: 2018-08-29

## 2018-08-30 ENCOUNTER — HOME CARE/HOSPICE - HEALTHEAST (OUTPATIENT)
Dept: HOME HEALTH SERVICES | Facility: HOME HEALTH | Age: 78
End: 2018-08-30

## 2018-08-30 ENCOUNTER — COMMUNICATION - HEALTHEAST (OUTPATIENT)
Dept: HOME HEALTH SERVICES | Facility: HOME HEALTH | Age: 78
End: 2018-08-30

## 2018-08-30 ENCOUNTER — COMMUNICATION - HEALTHEAST (OUTPATIENT)
Dept: NURSING | Facility: CLINIC | Age: 78
End: 2018-08-30

## 2018-08-31 ENCOUNTER — HOME CARE/HOSPICE - HEALTHEAST (OUTPATIENT)
Dept: HOME HEALTH SERVICES | Facility: HOME HEALTH | Age: 78
End: 2018-08-31

## 2018-08-31 ENCOUNTER — RECORDS - HEALTHEAST (OUTPATIENT)
Dept: LAB | Facility: CLINIC | Age: 78
End: 2018-08-31

## 2018-08-31 LAB
ALBUMIN UR-MCNC: NEGATIVE MG/DL
APPEARANCE UR: CLEAR
BILIRUB UR QL STRIP: NEGATIVE
COLOR UR AUTO: YELLOW
GLUCOSE UR STRIP-MCNC: NEGATIVE MG/DL
HGB UR QL STRIP: NEGATIVE
KETONES UR STRIP-MCNC: NEGATIVE MG/DL
LEUKOCYTE ESTERASE UR QL STRIP: NEGATIVE
NITRATE UR QL: NEGATIVE
PH UR STRIP: 6 [PH] (ref 4.5–8)
SP GR UR STRIP: 1.01 (ref 1–1.03)
UROBILINOGEN UR STRIP-ACNC: NORMAL

## 2018-09-04 ENCOUNTER — OFFICE VISIT - HEALTHEAST (OUTPATIENT)
Dept: FAMILY MEDICINE | Facility: CLINIC | Age: 78
End: 2018-09-04

## 2018-09-04 ENCOUNTER — COMMUNICATION - HEALTHEAST (OUTPATIENT)
Dept: FAMILY MEDICINE | Facility: CLINIC | Age: 78
End: 2018-09-04

## 2018-09-04 DIAGNOSIS — Z23 FLU VACCINE NEED: ICD-10-CM

## 2018-09-04 DIAGNOSIS — I10 ESSENTIAL HYPERTENSION: ICD-10-CM

## 2018-09-04 DIAGNOSIS — R10.13 EPIGASTRIC PAIN: ICD-10-CM

## 2018-09-04 DIAGNOSIS — I95.1 ORTHOSTATIC HYPOTENSION: ICD-10-CM

## 2018-09-04 DIAGNOSIS — M25.512 CHRONIC LEFT SHOULDER PAIN: ICD-10-CM

## 2018-09-04 DIAGNOSIS — G89.29 CHRONIC LEFT SHOULDER PAIN: ICD-10-CM

## 2018-09-04 DIAGNOSIS — R26.9 GAIT DIFFICULTY: ICD-10-CM

## 2018-09-05 ENCOUNTER — HOME CARE/HOSPICE - HEALTHEAST (OUTPATIENT)
Dept: HOME HEALTH SERVICES | Facility: HOME HEALTH | Age: 78
End: 2018-09-05

## 2018-09-06 ENCOUNTER — HOME CARE/HOSPICE - HEALTHEAST (OUTPATIENT)
Dept: HOME HEALTH SERVICES | Facility: HOME HEALTH | Age: 78
End: 2018-09-06

## 2018-09-07 ENCOUNTER — COMMUNICATION - HEALTHEAST (OUTPATIENT)
Dept: HOME HEALTH SERVICES | Facility: HOME HEALTH | Age: 78
End: 2018-09-07

## 2018-09-07 ENCOUNTER — HOSPITAL ENCOUNTER (OUTPATIENT)
Dept: CARDIOLOGY | Facility: HOSPITAL | Age: 78
Discharge: HOME | End: 2018-09-07

## 2018-09-07 ENCOUNTER — HOME CARE/HOSPICE - HEALTHEAST (OUTPATIENT)
Dept: HOME HEALTH SERVICES | Facility: HOME HEALTH | Age: 78
End: 2018-09-07

## 2018-09-07 DIAGNOSIS — R01.1 HEART MURMUR: ICD-10-CM

## 2018-09-07 ASSESSMENT — MIFFLIN-ST. JEOR: SCORE: 1174.62

## 2018-09-10 ENCOUNTER — HOME CARE/HOSPICE - HEALTHEAST (OUTPATIENT)
Dept: HOME HEALTH SERVICES | Facility: HOME HEALTH | Age: 78
End: 2018-09-10

## 2018-09-10 LAB
AORTIC ROOT: 3 CM
AORTIC VALVE MEAN VELOCITY: 184 CM/S
AV DIMENSIONLESS INDEX VTI: 0.3
AV MEAN GRADIENT: 15 MMHG
AV PEAK GRADIENT: 25.8 MMHG
AV VALVE AREA: 1 CM2
AV VELOCITY RATIO: 0.3
BSA FOR ECHO PROCEDURE: 1.79 M2
CV BLOOD PRESSURE: NORMAL MMHG
CV ECHO HEIGHT: 66.5 IN
CV ECHO WEIGHT: 151 LBS
DOP CALC AO PEAK VEL: 254 CM/S
DOP CALC AO VTI: 66.4 CM
DOP CALC LVOT AREA: 2.83 CM2
DOP CALC LVOT DIAMETER: 1.9 CM
DOP CALC LVOT PEAK VEL: 88 CM/S
DOP CALC LVOT STROKE VOLUME: 65.2 CM3
DOP CALCLVOT PEAK VEL VTI: 23 CM
EJECTION FRACTION: 65 % (ref 55–75)
FRACTIONAL SHORTENING: 28.6 % (ref 28–44)
INTERVENTRICULAR SEPTUM IN END DIASTOLE: 1.2 CM (ref 0.6–0.9)
IVS/PW RATIO: 0.9
LA AREA 1: 15.2 CM2
LA AREA 2: 26.1 CM2
LEFT ATRIUM LENGTH: 4.67 CM
LEFT ATRIUM SIZE: 4.5 CM
LEFT ATRIUM TO AORTIC ROOT RATIO: 1.5 NO UNITS
LEFT ATRIUM VOLUME INDEX: 40.3 ML/M2
LEFT ATRIUM VOLUME: 72.2 ML
LEFT VENTRICLE CARDIAC INDEX: 2.5 L/MIN/M2
LEFT VENTRICLE CARDIAC OUTPUT: 4.4 L/MIN
LEFT VENTRICLE DIASTOLIC VOLUME INDEX: 50.8 CM3/M2 (ref 34–74)
LEFT VENTRICLE DIASTOLIC VOLUME: 91 CM3 (ref 46–106)
LEFT VENTRICLE HEART RATE: 68 BPM
LEFT VENTRICLE MASS INDEX: 105.7 G/M2
LEFT VENTRICLE SYSTOLIC VOLUME INDEX: 17.9 CM3/M2 (ref 11–31)
LEFT VENTRICLE SYSTOLIC VOLUME: 32 CM3 (ref 14–42)
LEFT VENTRICULAR INTERNAL DIMENSION IN DIASTOLE: 4.2 CM (ref 3.8–5.2)
LEFT VENTRICULAR INTERNAL DIMENSION IN SYSTOLE: 3 CM (ref 2.2–3.5)
LEFT VENTRICULAR MASS: 189.2 G
LEFT VENTRICULAR OUTFLOW TRACT MEAN GRADIENT: 1 MMHG
LEFT VENTRICULAR OUTFLOW TRACT MEAN VELOCITY: 56.1 CM/S
LEFT VENTRICULAR POSTERIOR WALL IN END DIASTOLE: 1.3 CM (ref 0.6–0.9)
LV STROKE VOLUME INDEX: 36.4 ML/M2
MITRAL VALVE E/A RATIO: 0.9
MV AVERAGE E/E' RATIO: 18.9 CM/S
MV DECELERATION TIME: 197 MS
MV E'TISSUE VEL-LAT: 5.36 CM/S
MV E'TISSUE VEL-MED: 5.85 CM/S
MV LATERAL E/E' RATIO: 19.8
MV MEDIAL E/E' RATIO: 18.1
MV PEAK A VELOCITY: 112 CM/S
MV PEAK E VELOCITY: 106 CM/S
NUC REST DIASTOLIC VOLUME INDEX: 2416 LBS
NUC REST SYSTOLIC VOLUME INDEX: 66.5 IN
RIGHT VENTRICULAR INTERNAL DIMENSION IN DYSTOLE: 2.2 CM
TRICUSPID REGURGITATION PEAK PRESSURE GRADIENT: 35 MMHG
TRICUSPID VALVE ANULAR PLANE SYSTOLIC EXCURSION: 3.1 CM
TRICUSPID VALVE PEAK REGURGITANT VELOCITY: 296 CM/S

## 2018-09-12 ENCOUNTER — HOME CARE/HOSPICE - HEALTHEAST (OUTPATIENT)
Dept: HOME HEALTH SERVICES | Facility: HOME HEALTH | Age: 78
End: 2018-09-12

## 2018-09-13 ENCOUNTER — COMMUNICATION - HEALTHEAST (OUTPATIENT)
Dept: FAMILY MEDICINE | Facility: CLINIC | Age: 78
End: 2018-09-13

## 2018-09-14 ENCOUNTER — HOME CARE/HOSPICE - HEALTHEAST (OUTPATIENT)
Dept: HOME HEALTH SERVICES | Facility: HOME HEALTH | Age: 78
End: 2018-09-14

## 2018-09-14 ENCOUNTER — COMMUNICATION - HEALTHEAST (OUTPATIENT)
Dept: FAMILY MEDICINE | Facility: CLINIC | Age: 78
End: 2018-09-14

## 2018-09-17 ENCOUNTER — COMMUNICATION - HEALTHEAST (OUTPATIENT)
Dept: FAMILY MEDICINE | Facility: CLINIC | Age: 78
End: 2018-09-17

## 2018-09-17 PROBLEM — K59.00 CONSTIPATION: Status: ACTIVE | Noted: 2018-09-17

## 2018-09-17 PROBLEM — F43.23 ADJUSTMENT DISORDER WITH MIXED ANXIETY AND DEPRESSED MOOD: Status: ACTIVE | Noted: 2018-09-17

## 2018-09-17 PROBLEM — M54.50 LOWER BACK PAIN: Status: ACTIVE | Noted: 2018-09-17

## 2018-09-17 PROBLEM — F41.9 ANXIETY: Status: ACTIVE | Noted: 2017-05-05

## 2018-09-17 PROBLEM — M19.90 OSTEOARTHROSIS: Status: ACTIVE | Noted: 2018-09-17

## 2018-09-17 PROBLEM — K22.70 BARRETT'S ESOPHAGUS: Status: ACTIVE | Noted: 2018-09-17

## 2018-09-17 PROBLEM — F51.05 INSOMNIA DUE TO ANXIETY AND FEAR: Status: ACTIVE | Noted: 2018-09-17

## 2018-09-17 PROBLEM — F40.9 INSOMNIA DUE TO ANXIETY AND FEAR: Status: ACTIVE | Noted: 2018-09-17

## 2018-09-17 PROBLEM — F43.22 ADJUSTMENT DISORDER WITH ANXIOUS MOOD: Status: ACTIVE | Noted: 2018-09-17

## 2018-09-17 PROBLEM — R00.1 BRADYCARDIA: Status: ACTIVE | Noted: 2018-09-17

## 2018-09-17 PROBLEM — I71.40 ANEURYSM OF ABDOMINAL VESSEL (H): Status: ACTIVE | Noted: 2018-09-17

## 2018-09-17 PROBLEM — R42 DIZZINESS: Status: ACTIVE | Noted: 2018-09-17

## 2018-09-17 NOTE — PROGRESS NOTES
[facility]  PRIMARY CARE PROVIDER AND CLINIC:  Cathy Sargent [address] / [facility] [address]  Chief Complaint   Patient presents with     Clinic Care Coordination - Initial     [facility] Record Number:  [id number]    HPI:    Mehreen Camara is a 78 year old  (1940),admitted to the [facility] from Home.  Admitted to this facility for  medical management and nursing care.  HPI information obtained from: facility chart records, facility staff, patient report, [facility] chart review and Care Everywhere [facility] chart review.      She has a PMH significant for AAA sp bifurcation graft placement, mild aortic stenosis thought related to rheumatic fever as a child, movement disorder concerning or PD, Harris's esophagus, GERD, bradycardia, depression with anxiety, constipation, dizziness, tinnitus, HTN, orthostatic hypotension, hypothyroidism, LBP with bilateral sciatica, osteoarthritis, L shoulder pain, osteopenia, urinary incontinence, who was previously living at another assisted living facility but that was too expensive, so moved to [facility].     Current issues are   Essential hypertension  Orthostatic hypotension dysautonomic syndrome (H)  Aneurysm of abdominal vessel (H)  S/P AAA repair using bifurcation graft  Episode of recurrent major depressive disorder, unspecified depression episode severity (H)  Adjustment disorder with anxious mood  Movement disorder  Acquired hypothyroidism  Age-related osteoporosis without current pathological fracture  Urinary incontinence in female  Rheumatic aortic stenosis  Primary osteoarthritis involving multiple joints  Chronic idiopathic constipation  Harris's esophagus with dysplasia  Takes dietary supplements  Chronic bilateral low back pain with bilateral sciatica  Dermatitis  Dry eyes  Neuropathic pain of both legs     She moved in over the weekend and is settling in to her apartment now. She reports she has been  "doing her own medications over the past years, without too much difficulty .  She reports she has an appointment tomorrow at the [facility] Disorder Clinic at [facility]. She has noticed an improvement in her ambulation since starting sinemet, but feels she is still not \"back to normal\".      CODE STATUS/ADVANCE DIRECTIVES DISCUSSION:   DNR / DNI  Patient's living condition: lives in an assisted living facility    ALLERGIES:Penicillins; Aspirin; Atenolol; Atorvastatin; Bupropion; Clindamycin; Codeine; Ibuprofen; Lovastatin; and Cephalexin  PAST MEDICAL HISTORY:  has a past medical history of Adrenal adenoma; Arthritis; Depressive disorder; Hypertension; Rheumatic fever; Small bowel obstruction; and Thyroid disease.  PAST SURGICAL HISTORY:  has a past surgical history that includes orthopedic surgery; Thyroidectomy (2011); joint replacement (Right, 2003); Hysterectomy (2013); carpal tunnel release rt/lt (Bilateral, 2013); Spine surgery (1981); and aaa repair (2015).  FAMILY HISTORY: family history includes Coronary Artery Disease in her father and mother; Hypertension in her mother; Other Cancer in her brother; Parkinsonism in an other family member.  SOCIAL HISTORY:  reports that she quit smoking about 23 years ago. Her smoking use included Cigarettes. She smoked 0.50 packs per day. She has never used smokeless tobacco. She reports that she does not drink alcohol or use illicit drugs.    Post Discharge Medication Reconciliation Status: discharge medications reconciled and changed, per note/orders (see AVS).  Current Outpatient Prescriptions   Medication Sig Dispense Refill     acetaminophen (TYLENOL) 500 MG tablet Take 1-2 tablets (500-1,000 mg) by mouth 3 times daily as needed for mild pain 60 tablet 3     augmented betamethasone dipropionate (DIPROLENE-AF) 0.05 % cream Apply sparingly to affected area twice daily as needed.  Do not apply to face. 50 g 0     carbidopa-levodopa (SINEMET)  MG per tablet " Take 2 tablets by mouth 3 times daily 180 tablet 1     cholecalciferol 2000 units tablet Take 1 tablet by mouth daily 30 tablet 3     citalopram (CELEXA) 20 MG tablet Take 1 tablet (20 mg) by mouth daily 90 tablet 1     diclofenac (VOLTAREN) 1 % GEL topical gel Place onto the skin 4 times daily QID prn to back and neck       gabapentin (NEURONTIN) 100 MG capsule Take 1 capsule (100 mg) by mouth 2 times daily 90 capsule 1     gabapentin (NEURONTIN) 100 MG capsule Take 2 capsules (200 mg) by mouth At Bedtime 90 capsule 1     levothyroxine (SYNTHROID/LEVOTHROID) 137 MCG tablet Take 1 tablet (137 mcg) by mouth daily 90 tablet 3     Lidocaine (LIDOCARE) 4 % Patch Place 1 patch onto the skin every 24 hours 30 patch 1     losartan (COZAAR) 50 MG tablet Take 1 tablet (50 mg) by mouth 2 times daily 90 tablet 1     multivitamin, therapeutic with minerals (MULTI-VITAMIN) TABS tablet Take 1 tablet by mouth daily 100 tablet 3     omeprazole (PRILOSEC) 20 MG CR capsule Take 1 capsule (20 mg) by mouth 2 times daily 90 capsule 3     polyethylene glycol (MIRALAX) powder Take 17 g by mouth daily 510 g 1     polyethylene glycol 0.4%- propylene glycol 0.3% (SYSTANE ULTRA) 0.4-0.3 % SOLN ophthalmic solution Place 1 drop into both eyes 4 times daily as needed for dry eyes       ranitidine (ZANTAC) 150 MG capsule Take 1 capsule (150 mg) by mouth At Bedtime 180 capsule 3     senna (SENOKOT) 8.6 MG tablet Take 1 tablet by mouth daily as needed for constipation 120 tablet 1     spironolactone (ALDACTONE) 25 MG tablet Take 0.5 tablets (12.5 mg) by mouth daily 45 tablet 1     triamcinolone (KENALOG) 0.1 % cream Apply topically 2 times daily as needed for irritation BID prn to feet         ROS:  10 point ROS of systems including Constitutional, Eyes, Respiratory, Cardiovascular, Gastroenterology, Genitourinary, Integumentary, Muscularskeletal, Psychiatric were all negative except for pertinent positives noted in my HPI.    Exam:  /88   Pulse 81  Temp 98  F (36.7  C)  Resp 18  Wt 148 lb (67.1 kg)  SpO2 96%  GENERAL APPEARANCE:  Alert, in no distress, appears healthy  ENT:  Mouth and posterior oropharynx normal, moist mucous membranes  EYES:  EOM, conjunctivae, lids, pupils and irises normal  RESP:  respiratory effort and palpation of chest normal, lungs clear to auscultation , no respiratory distress  CV:  Palpation and auscultation of heart done , regular rate and rhythm, no murmur, rub, or gallop, no edema  M/S:   Gait and station abnormal uses walker for longer distances, and ambulates without device in her apartment, unsteady on her feet at times  SKIN:  Inspection of skin and subcutaneous tissuewithout open areas or other rashes  NEURO:   Cranial nerves 2-12 are normal tested and grossly at patient's baseline  PSYCH:  oriented X 3, normal insight, judgement and memory    Lab/Diagnostic data: reviewed in Care Everywhere  7/2018  Creat 0.67  Hemoglobin 13.3  TSH 3.83       ASSESSMENT/PLAN:  Essential hypertension  Orthostatic hypotension dysautonomic syndrome (H)  She is on losartan, spironolactone.  She has parameters in place to hold losartan if SBP is <140.  In reviewing her notes, it appears that she is holding her losartan about 75-90% of the time, with her average SBP about 105-135.  She has no edema. She reports she is able to manage her hypotension by changing positions slowly/carefully.   BP goals are <150/90 mm Hg.This is higher than ACC and AHA recommendations due to goals of care, risk for hypotension, risk of dizziness and falls and risk of tissue/cerebral hypoperfusion. Her BP is generally less than goal, and we could likely adjust her losartan dosing. However, due to her recent (yesterday) move to assisted living facility, she is reluctant to make any changes at this time.  The current medical regimen is effective;  continue present plan and medications and will re-evaluate at next visit.     - spironolactone (ALDACTONE) 25 MG  tablet; Take 0.5 tablets (12.5 mg) by mouth daily  Hold if SBP<140  - losartan (COZAAR) 50 MG tablet; Take 1 tablet (50 mg) by mouth 2 times daily    Aneurysm of abdominal vessel (H)  S/P AAA repair using bifurcation graft  Repair of AAA in 2015, followed by cardiology at [facility], Dr. Forrest.  She has follow up planned with him in November 2018.  She offers no concerns of pain, increased dyspnea, or unusual dizziness other than her normal.     Episode of recurrent major depressive disorder, unspecified depression episode severity (H)  Adjustment disorder with anxious mood  She is on celexa 20 daily. She reports she does not feel depressed or anxious, but does have history of this noted on several occasions by her PCP.  For now, will continue and consider decrease of this medication as she adjusts to her new living arrangements.   - citalopram (CELEXA) 20 MG tablet; Take 1 tablet (20 mg) by mouth daily    Movement disorder  Noted to have severe weakness and difficulty moving her legs during a recent hospital stay (in July at [facility] )  She was seen by neurology there and thought to have movement disorder/parkinsons.  She was started on sinemet and does note some improvement in her weakness and ability to ambulate. She is ambulating without rolling walker in her apartment but does use the rolling walker when she leaves her apartment.  She does not have shuffling gait, did stumble a bit as she stood-she reports this was due to some mild dizziness, and does have a somewhat mask-like appearance on her face. She has an initial appointment at [facility] tomorrow to be evaluated for their Movement Disorder clinic.  She plans to keep this appointment.   - carbidopa-levodopa (SINEMET)  MG per tablet; Take 2 tablets by mouth 3 times daily  Future Appointments  Date Time Provider Department Center   9/19/2018 1:00 PM Chris Monterroso MD [facility]        Acquired hypothyroidism  Surgical  removal of thyroid/nodules in 2011 with subsequent start of levothyroxine. Last TSH 3.83 per review of Care Everywhere.  Will monitor, check TSH at least annually.   - levothyroxine (SYNTHROID/LEVOTHROID) 137 MCG tablet; Take 1 tablet (137 mcg) by mouth daily    Age-related osteoporosis without current pathological fracture  Not currently on calcium supplement but is on vit d.  Per review of records, it appears that ibandronate was recently discontinued during a TCU stay.  Will review with her at next visit, may benefit from calcium supplement as well  - cholecalciferol 2000 units tablet; Take 1 tablet by mouth daily    Urinary incontinence in female  She reports chronic urinary incontinence, using pads. She reports no dysuria. Stable.     Rheumatic aortic stenosis  With mild MITUL 2/6.  She thinks she had rheumatic heart disease as a child.  Recent Echo, done at [facility] in 9/2018 indicates mild aortic stenosis with trace AV insufficiency and mod hypertrophy with LVEF >60%.     Primary osteoarthritis involving multiple joints  She reports chronic OA pain, tre in SI joint, low back, L shoulder.  She has had several SI joint injections.  She would like to have injection of L shoulder if possible.  Xray of L shoulder in 3/2017 shows moderately severe degenerative changes of the the L glenohumeral joint with joint space narrowing and marginal osteophyte formation.  Moderately severe degenerative changes also affect the acromioclavicular joint.  She would likely benefit from L shoulder injection.  Discussed options, including visit to [facility] versus NP or Ortho PA providing onsite injections. She will consider her options.   - acetaminophen (TYLENOL) 500 MG tablet; Take 1-2 tablets (500-1,000 mg) by mouth 3 times daily as needed for mild pain  - Lidocaine (LIDOCARE) 4 % Patch; Place 1 patch onto the skin every 24 hours  - diclofenac (VOLTAREN) 1 % GEL topical gel; Place onto the skin 4 times daily QID prn to  back and neck    Chronic idiopathic constipation  She reports years of chronic constipation and generally takes miralax daily. She reprots she does take senna s occasionally for ongoing constipation.   - polyethylene glycol (MIRALAX) powder; Take 17 g by mouth daily  - senna (SENOKOT) 8.6 MG tablet; Take 1 tablet by mouth daily as needed for constipation    Harris's esophagus with unknown dysplasia  GERD  Has had significant problems in the past, now on omeprazole as well as ranitidine and has also taken carafate in the past.  Reports this is stable now.   - omeprazole (PRILOSEC) 20 MG CR capsule; Take 1 capsule (20 mg) by mouth 2 times daily  - ranitidine (ZANTAC) 150 MG capsule; Take 1 capsule (150 mg) by mouth At Bedtime    Takes dietary supplements  On MVI, prefers chew tabs.   - multivitamin, therapeutic with minerals (MULTI-VITAMIN) TABS tablet; Take 1 tablet by mouth daily    Chronic bilateral low back pain with bilateral sciatica  Neuropathic pain of legs  She reports chronic pain in low back with sciatica. She reports she is not sure the gabapentin is helpful, feels it does make her tired. Again, she is unwilling to make changes to her medication regimen at this time, as she has just moved to this assisted living facility. She would like to consider her options, and get adjusted first. The current medical regimen is effective;  continue present plan and medications.   - gabapentin (NEURONTIN) 100 MG capsule; Take 2 capsules (200 mg) by mouth At Bedtime   - gabapentin (NEURONTIN) 100 MG capsule; Take 1 capsule (100 mg) by mouth 2 times daily    Dermatitis  No current open areas or areas of concerns on skin, but does use the following medications prn at thist emre. The current medical regimen is effective;  continue present plan and medications.   - augmented betamethasone dipropionate (DIPROLENE-AF) 0.05 % cream; Apply sparingly to affected area twice daily as needed.  Do not apply to face.  - triamcinolone  (KENALOG) 0.1 % cream; Apply topically 2 times daily as needed for irritation BID prn to feet    Dry eyes  She reports chronic dry eyes, using eye gtts at least daily. The current medical regimen is effective;  continue present plan and medications.   - polyethylene glycol 0.4%- propylene glycol 0.3% (SYSTANE ULTRA) 0.4-0.3 % SOLN ophthalmic solution; Place 1 drop into both eyes 4 times daily as needed for dry eyes      Orders:  1. Please set up meds q week  2. Patient my self administration all meds and treatments after set up.  3. Only medication refill needed is ranitidine at this time  4. DNR/DNI  5.  Medications as noted above    Total time with a new patient visit in the assisted livin minutes including discussions about the POC and care coordination with the patient and caregiver. Greater than 50% of total time spent with counseling and coordinating care due to multiple medical comorbidities    Electronically signed by:  HAILEE Contreras CNP

## 2018-09-18 ENCOUNTER — ASSISTED LIVING VISIT (OUTPATIENT)
Dept: GERIATRICS | Facility: CLINIC | Age: 78
End: 2018-09-18
Payer: COMMERCIAL

## 2018-09-18 VITALS
WEIGHT: 148 LBS | SYSTOLIC BLOOD PRESSURE: 103 MMHG | TEMPERATURE: 98 F | OXYGEN SATURATION: 96 % | DIASTOLIC BLOOD PRESSURE: 88 MMHG | RESPIRATION RATE: 18 BRPM | HEART RATE: 81 BPM

## 2018-09-18 DIAGNOSIS — R32 URINARY INCONTINENCE IN FEMALE: ICD-10-CM

## 2018-09-18 DIAGNOSIS — I71.40 ANEURYSM OF ABDOMINAL VESSEL (H): ICD-10-CM

## 2018-09-18 DIAGNOSIS — I95.1 ORTHOSTATIC HYPOTENSION DYSAUTONOMIC SYNDROME: ICD-10-CM

## 2018-09-18 DIAGNOSIS — F33.9 EPISODE OF RECURRENT MAJOR DEPRESSIVE DISORDER, UNSPECIFIED DEPRESSION EPISODE SEVERITY (H): ICD-10-CM

## 2018-09-18 DIAGNOSIS — Z86.79 S/P AAA REPAIR USING BIFURCATION GRAFT: ICD-10-CM

## 2018-09-18 DIAGNOSIS — M54.41 CHRONIC BILATERAL LOW BACK PAIN WITH BILATERAL SCIATICA: ICD-10-CM

## 2018-09-18 DIAGNOSIS — G89.29 CHRONIC BILATERAL LOW BACK PAIN WITH BILATERAL SCIATICA: ICD-10-CM

## 2018-09-18 DIAGNOSIS — M15.0 PRIMARY OSTEOARTHRITIS INVOLVING MULTIPLE JOINTS: ICD-10-CM

## 2018-09-18 DIAGNOSIS — M81.0 AGE-RELATED OSTEOPOROSIS WITHOUT CURRENT PATHOLOGICAL FRACTURE: ICD-10-CM

## 2018-09-18 DIAGNOSIS — G57.93 NEUROPATHIC PAIN OF BOTH LEGS: ICD-10-CM

## 2018-09-18 DIAGNOSIS — G25.9 MOVEMENT DISORDER: ICD-10-CM

## 2018-09-18 DIAGNOSIS — K22.719 BARRETT'S ESOPHAGUS WITH DYSPLASIA: ICD-10-CM

## 2018-09-18 DIAGNOSIS — K59.04 CHRONIC IDIOPATHIC CONSTIPATION: ICD-10-CM

## 2018-09-18 DIAGNOSIS — M54.42 CHRONIC BILATERAL LOW BACK PAIN WITH BILATERAL SCIATICA: ICD-10-CM

## 2018-09-18 DIAGNOSIS — E03.9 ACQUIRED HYPOTHYROIDISM: ICD-10-CM

## 2018-09-18 DIAGNOSIS — H04.123 DRY EYES: ICD-10-CM

## 2018-09-18 DIAGNOSIS — L30.9 DERMATITIS: ICD-10-CM

## 2018-09-18 DIAGNOSIS — Z78.9 TAKES DIETARY SUPPLEMENTS: ICD-10-CM

## 2018-09-18 DIAGNOSIS — I06.0 RHEUMATIC AORTIC STENOSIS: ICD-10-CM

## 2018-09-18 DIAGNOSIS — F43.22 ADJUSTMENT DISORDER WITH ANXIOUS MOOD: ICD-10-CM

## 2018-09-18 DIAGNOSIS — Z95.828 S/P AAA REPAIR USING BIFURCATION GRAFT: ICD-10-CM

## 2018-09-18 DIAGNOSIS — I10 ESSENTIAL HYPERTENSION: Primary | ICD-10-CM

## 2018-09-18 PROBLEM — F51.01 PRIMARY INSOMNIA: Status: ACTIVE | Noted: 2017-07-04

## 2018-09-18 PROBLEM — M67.919 DISORDER OF BURSAE AND TENDONS IN SHOULDER REGION: Status: ACTIVE | Noted: 2018-09-18

## 2018-09-18 PROBLEM — R29.898 LOWER EXTREMITY WEAKNESS: Status: ACTIVE | Noted: 2018-07-04

## 2018-09-18 PROBLEM — M71.9 DISORDER OF BURSAE AND TENDONS IN SHOULDER REGION: Status: ACTIVE | Noted: 2018-09-18

## 2018-09-18 NOTE — LETTER
9/18/2018        RE: Mehreen Camara  [facility]  [address] [facility] [address]        [place] [facility]  PRIMARY CARE PROVIDER AND CLINIC:  Cathy Sargent [address] [place] [address] / [facility] [address]  Chief Complaint   Patient presents with     Clinic Care Coordination - Initial     [place] Medical Record Number:  [id number]    HPI:    Mehreen Camara is a 78 year old  (1940),admitted to the [facility] from Home.  Admitted to this facility for  medical management and nursing care.  HPI information obtained from: facility chart records, facility staff, patient report, [facility] chart review and Care Everywhere [facility] chart review.      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     Current issues are   Essential hypertension  Orthostatic hypotension dysautonomic syndrome (H)  Aneurysm of abdominal vessel (H)  S/P AAA repair using bifurcation graft  Episode of recurrent major depressive disorder, unspecified depression episode severity (H)  Adjustment disorder with anxious mood  Movement disorder  Acquired hypothyroidism  Age-related osteoporosis without current pathological fracture  Urinary incontinence in female  Rheumatic aortic stenosis  Primary osteoarthritis involving multiple joints  Chronic idiopathic constipation  Harris's esophagus with dysplasia  Takes dietary supplements  Chronic bilateral low back pain with bilateral sciatica  Dermatitis  Dry eyes  Neuropathic pain of  "both legs     She moved in over the weekend and is settling in to her apartment now. She reports she has been doing her own medications over the past years, without too much difficulty .  She reports she has an appointment tomorrow at the [facility] Disorder [facility] at [facility]. She has noticed an improvement in her ambulation since starting sinemet, but feels she is still not \"back to normal\".      CODE STATUS/ADVANCE DIRECTIVES DISCUSSION:   DNR / DNI  Patient's living condition: lives in an assisted living facility    ALLERGIES:Penicillins; Aspirin; Atenolol; Atorvastatin; Bupropion; Clindamycin; Codeine; Ibuprofen; Lovastatin; and Cephalexin  PAST MEDICAL HISTORY:  has a past medical history of Adrenal adenoma; Arthritis; Depressive disorder; Hypertension; Rheumatic fever; Small bowel obstruction; and Thyroid disease.  PAST SURGICAL HISTORY:  has a past surgical history that includes orthopedic surgery; Thyroidectomy (2011); joint replacement (Right, 2003); Hysterectomy (2013); carpal tunnel release rt/lt (Bilateral, 2013); Spine surgery (1981); and aaa repair (2015).  FAMILY HISTORY: family history includes Coronary Artery Disease in her father and mother; Hypertension in her mother; Other Cancer in her brother; Parkinsonism in an other family member.  SOCIAL HISTORY:  reports that she quit smoking about 23 years ago. Her smoking use included Cigarettes. She smoked 0.50 packs per day. She has never used smokeless tobacco. She reports that she does not drink alcohol or use illicit drugs.    Post Discharge Medication Reconciliation Status: discharge medications reconciled and changed, per note/orders (see AVS).  Current Outpatient Prescriptions   Medication Sig Dispense Refill     acetaminophen (TYLENOL) 500 MG tablet Take 1-2 tablets (500-1,000 mg) by mouth 3 times daily as needed for mild pain 60 tablet 3     augmented betamethasone dipropionate (DIPROLENE-AF) 0.05 % cream Apply sparingly to affected " area twice daily as needed.  Do not apply to face. 50 g 0     carbidopa-levodopa (SINEMET)  MG per tablet Take 2 tablets by mouth 3 times daily 180 tablet 1     cholecalciferol 2000 units tablet Take 1 tablet by mouth daily 30 tablet 3     citalopram (CELEXA) 20 MG tablet Take 1 tablet (20 mg) by mouth daily 90 tablet 1     diclofenac (VOLTAREN) 1 % GEL topical gel Place onto the skin 4 times daily QID prn to back and neck       gabapentin (NEURONTIN) 100 MG capsule Take 1 capsule (100 mg) by mouth 2 times daily 90 capsule 1     gabapentin (NEURONTIN) 100 MG capsule Take 2 capsules (200 mg) by mouth At Bedtime 90 capsule 1     levothyroxine (SYNTHROID/LEVOTHROID) 137 MCG tablet Take 1 tablet (137 mcg) by mouth daily 90 tablet 3     Lidocaine (LIDOCARE) 4 % Patch Place 1 patch onto the skin every 24 hours 30 patch 1     losartan (COZAAR) 50 MG tablet Take 1 tablet (50 mg) by mouth 2 times daily 90 tablet 1     multivitamin, therapeutic with minerals (MULTI-VITAMIN) TABS tablet Take 1 tablet by mouth daily 100 tablet 3     omeprazole (PRILOSEC) 20 MG CR capsule Take 1 capsule (20 mg) by mouth 2 times daily 90 capsule 3     polyethylene glycol (MIRALAX) powder Take 17 g by mouth daily 510 g 1     polyethylene glycol 0.4%- propylene glycol 0.3% (SYSTANE ULTRA) 0.4-0.3 % SOLN ophthalmic solution Place 1 drop into both eyes 4 times daily as needed for dry eyes       ranitidine (ZANTAC) 150 MG capsule Take 1 capsule (150 mg) by mouth At Bedtime 180 capsule 3     senna (SENOKOT) 8.6 MG tablet Take 1 tablet by mouth daily as needed for constipation 120 tablet 1     spironolactone (ALDACTONE) 25 MG tablet Take 0.5 tablets (12.5 mg) by mouth daily 45 tablet 1     triamcinolone (KENALOG) 0.1 % cream Apply topically 2 times daily as needed for irritation BID prn to feet         ROS:  10 point ROS of systems including Constitutional, Eyes, Respiratory, Cardiovascular, Gastroenterology, Genitourinary, Integumentary,  Muscularskeletal, Psychiatric were all negative except for pertinent positives noted in my HPI.    Exam:  /88  Pulse 81  Temp 98  F (36.7  C)  Resp 18  Wt 148 lb (67.1 kg)  SpO2 96%  GENERAL APPEARANCE:  Alert, in no distress, appears healthy  ENT:  Mouth and posterior oropharynx normal, moist mucous membranes  EYES:  EOM, conjunctivae, lids, pupils and irises normal  RESP:  respiratory effort and palpation of chest normal, lungs clear to auscultation , no respiratory distress  CV:  Palpation and auscultation of heart done , regular rate and rhythm, no murmur, rub, or gallop, no edema  M/S:   Gait and station abnormal uses walker for longer distances, and ambulates without device in her apartment, unsteady on her feet at times  SKIN:  Inspection of skin and subcutaneous tissuewithout open areas or other rashes  NEURO:   Cranial nerves 2-12 are normal tested and grossly at patient's baseline  PSYCH:  oriented X 3, normal insight, judgement and memory    Lab/Diagnostic data: reviewed in Care Everywhere  7/2018  Creat 0.67  Hemoglobin 13.3  TSH 3.83       ASSESSMENT/PLAN:  Essential hypertension  Orthostatic hypotension dysautonomic syndrome (H)  She is on losartan, spironolactone.  She has parameters in place to hold losartan if SBP is <140.  In reviewing her notes, it appears that she is holding her losartan about 75-90% of the time, with her average SBP about 105-135.  She has no edema. She reports she is able to manage her hypotension by changing positions slowly/carefully.   BP goals are <150/90 mm Hg.This is higher than ACC and AHA recommendations due to goals of care, risk for hypotension, risk of dizziness and falls and risk of tissue/cerebral hypoperfusion. Her BP is generally less than goal, and we could likely adjust her losartan dosing. However, due to her recent (yesterday) move to assisted living facility, she is reluctant to make any changes at this time.  The current medical regimen is  effective;  continue present plan and medications and will re-evaluate at next visit.     - spironolactone (ALDACTONE) 25 MG tablet; Take 0.5 tablets (12.5 mg) by mouth daily  Hold if SBP<140  - losartan (COZAAR) 50 MG tablet; Take 1 tablet (50 mg) by mouth 2 times daily    Aneurysm of abdominal vessel (H)  S/P AAA repair using bifurcation graft  Repair of AAA in 2015, followed by cardiology at [facility], Dr. Forrest.  She has follow up planned with him in November 2018.  She offers no concerns of pain, increased dyspnea, or unusual dizziness other than her normal.     Episode of recurrent major depressive disorder, unspecified depression episode severity (H)  Adjustment disorder with anxious mood  She is on celexa 20 daily. She reports she does not feel depressed or anxious, but does have history of this noted on several occasions by her PCP.  For now, will continue and consider decrease of this medication as she adjusts to her new living arrangements.   - citalopram (CELEXA) 20 MG tablet; Take 1 tablet (20 mg) by mouth daily    Movement disorder  Noted to have severe weakness and difficulty moving her legs during a recent hospital stay (in July at [facility] )  She was seen by neurology there and thought to have movement disorder/parkinsons.  She was started on sinemet and does note some improvement in her weakness and ability to ambulate. She is ambulating without rolling walker in her apartment but does use the rolling walker when she leaves her apartment.  She does not have shuffling gait, did stumble a bit as she stood-she reports this was due to some mild dizziness, and does have a somewhat mask-like appearance on her face. She has an initial appointment at [facility] tomorrow to be evaluated for their Movement Disorder clinic.  She plans to keep this appointment.   - carbidopa-levodopa (SINEMET)  MG per tablet; Take 2 tablets by mouth 3 times daily  Future Appointments  Date Time  Provider Department Center   9/19/2018 1:00 PM Chris Monterroso MD [facility]        Acquired hypothyroidism  Surgical removal of thyroid/nodules in 2011 with subsequent start of levothyroxine. Last TSH 3.83 per review of Care Everywhere.  Will monitor, check TSH at least annually.   - levothyroxine (SYNTHROID/LEVOTHROID) 137 MCG tablet; Take 1 tablet (137 mcg) by mouth daily    Age-related osteoporosis without current pathological fracture  Not currently on calcium supplement but is on vit d.  Per review of records, it appears that ibandronate was recently discontinued during a TCU stay.  Will review with her at next visit, may benefit from calcium supplement as well  - cholecalciferol 2000 units tablet; Take 1 tablet by mouth daily    Urinary incontinence in female  She reports chronic urinary incontinence, using pads. She reports no dysuria. Stable.     Rheumatic aortic stenosis  With mild MITUL 2/6.  She thinks she had rheumatic heart disease as a child.  Recent Echo, done at [facility] in 9/2018 indicates mild aortic stenosis with trace AV insufficiency and mod hypertrophy with LVEF >60%.     Primary osteoarthritis involving multiple joints  She reports chronic OA pain, tre in SI joint, low back, L shoulder.  She has had several SI joint injections.  She would like to have injection of L shoulder if possible.  Xray of L shoulder in 3/2017 shows moderately severe degenerative changes of the the L glenohumeral joint with joint space narrowing and marginal osteophyte formation.  Moderately severe degenerative changes also affect the acromioclavicular joint.  She would likely benefit from L shoulder injection.  Discussed options, including visit to [facility] versus NP or Ortho PA providing onsite injections. She will consider her options.   - acetaminophen (TYLENOL) 500 MG tablet; Take 1-2 tablets (500-1,000 mg) by mouth 3 times daily as needed for mild pain  - Lidocaine (LIDOCARE) 4 % Patch; Place 1 patch  onto the skin every 24 hours  - diclofenac (VOLTAREN) 1 % GEL topical gel; Place onto the skin 4 times daily QID prn to back and neck    Chronic idiopathic constipation  She reports years of chronic constipation and generally takes miralax daily. She reprots she does take senna s occasionally for ongoing constipation.   - polyethylene glycol (MIRALAX) powder; Take 17 g by mouth daily  - senna (SENOKOT) 8.6 MG tablet; Take 1 tablet by mouth daily as needed for constipation    Harris's esophagus with unknown dysplasia  GERD  Has had significant problems in the past, now on omeprazole as well as ranitidine and has also taken carafate in the past.  Reports this is stable now.   - omeprazole (PRILOSEC) 20 MG CR capsule; Take 1 capsule (20 mg) by mouth 2 times daily  - ranitidine (ZANTAC) 150 MG capsule; Take 1 capsule (150 mg) by mouth At Bedtime    Takes dietary supplements  On MVI, prefers chew tabs.   - multivitamin, therapeutic with minerals (MULTI-VITAMIN) TABS tablet; Take 1 tablet by mouth daily    Chronic bilateral low back pain with bilateral sciatica  Neuropathic pain of legs  She reports chronic pain in low back with sciatica. She reports she is not sure the gabapentin is helpful, feels it does make her tired. Again, she is unwilling to make changes to her medication regimen at this time, as she has just moved to this assisted living facility. She would like to consider her options, and get adjusted first. The current medical regimen is effective;  continue present plan and medications.   - gabapentin (NEURONTIN) 100 MG capsule; Take 2 capsules (200 mg) by mouth At Bedtime   - gabapentin (NEURONTIN) 100 MG capsule; Take 1 capsule (100 mg) by mouth 2 times daily    Dermatitis  No current open areas or areas of concerns on skin, but does use the following medications prn at thist emre. The current medical regimen is effective;  continue present plan and medications.   - augmented betamethasone dipropionate  (DIPROLENE-AF) 0.05 % cream; Apply sparingly to affected area twice daily as needed.  Do not apply to face.  - triamcinolone (KENALOG) 0.1 % cream; Apply topically 2 times daily as needed for irritation BID prn to feet    Dry eyes  She reports chronic dry eyes, using eye gtts at least daily. The current medical regimen is effective;  continue present plan and medications.   - polyethylene glycol 0.4%- propylene glycol 0.3% (SYSTANE ULTRA) 0.4-0.3 % SOLN ophthalmic solution; Place 1 drop into both eyes 4 times daily as needed for dry eyes      Orders:  1. Please set up meds q week  2. Patient my self administration all meds and treatments after set up.  3. Only medication refill needed is ranitidine at this time  4. DNR/DNI  5.  Medications as noted above    Total time with a new patient visit in the assisted livin minutes including discussions about the POC and care coordination with the patient and caregiver. Greater than 50% of total time spent with counseling and coordinating care due to multiple medical comorbidities    Electronically signed by:  HAILEE Contreras CNP                    Sincerely,        HAILEE Contreras CNP

## 2018-09-19 ENCOUNTER — HOME CARE/HOSPICE - HEALTHEAST (OUTPATIENT)
Dept: HOME HEALTH SERVICES | Facility: HOME HEALTH | Age: 78
End: 2018-09-19

## 2018-09-19 ENCOUNTER — OFFICE VISIT (OUTPATIENT)
Dept: NEUROLOGY | Facility: CLINIC | Age: 78
End: 2018-09-19
Payer: MEDICAID

## 2018-09-19 VITALS
HEIGHT: 66 IN | DIASTOLIC BLOOD PRESSURE: 79 MMHG | SYSTOLIC BLOOD PRESSURE: 129 MMHG | BODY MASS INDEX: 24.17 KG/M2 | WEIGHT: 150.4 LBS | HEART RATE: 77 BPM

## 2018-09-19 DIAGNOSIS — G20.C PARKINSONISM, UNSPECIFIED PARKINSONISM TYPE (H): Primary | ICD-10-CM

## 2018-09-19 PROBLEM — F41.9 ANXIETY: Status: RESOLVED | Noted: 2017-05-05 | Resolved: 2018-09-19

## 2018-09-19 PROBLEM — I06.0 RHEUMATIC AORTIC STENOSIS: Status: ACTIVE | Noted: 2018-09-19

## 2018-09-19 RX ORDER — LEVOTHYROXINE SODIUM 137 UG/1
137 TABLET ORAL DAILY
Qty: 90 TABLET | Refills: 3
Start: 2018-09-19 | End: 2019-05-11

## 2018-09-19 RX ORDER — LIDOCAINE 4 G/G
1 PATCH TOPICAL EVERY 24 HOURS
Qty: 30 PATCH | Refills: 1
Start: 2018-09-19 | End: 2018-10-12

## 2018-09-19 RX ORDER — LOSARTAN POTASSIUM 50 MG/1
50 TABLET ORAL 2 TIMES DAILY
Qty: 90 TABLET | Refills: 1
Start: 2018-09-19 | End: 2018-10-12

## 2018-09-19 RX ORDER — GABAPENTIN 100 MG/1
200 CAPSULE ORAL AT BEDTIME
Qty: 90 CAPSULE | Refills: 1
Start: 2018-09-19 | End: 2019-01-22

## 2018-09-19 RX ORDER — SENNOSIDES A AND B 8.6 MG/1
1 TABLET, FILM COATED ORAL DAILY PRN
Qty: 120 TABLET | Refills: 1
Start: 2018-09-19 | End: 2019-03-05

## 2018-09-19 RX ORDER — GABAPENTIN 100 MG/1
100 CAPSULE ORAL 2 TIMES DAILY
Qty: 90 CAPSULE | Refills: 1
Start: 2018-09-19 | End: 2018-10-12

## 2018-09-19 RX ORDER — CITALOPRAM HYDROBROMIDE 20 MG/1
20 TABLET ORAL DAILY
Qty: 90 TABLET | Refills: 1
Start: 2018-09-19 | End: 2019-03-02

## 2018-09-19 RX ORDER — ACETAMINOPHEN 500 MG
500-1000 TABLET ORAL 3 TIMES DAILY PRN
Qty: 60 TABLET | Refills: 3
Start: 2018-09-19 | End: 2019-03-02 | Stop reason: DRUGHIGH

## 2018-09-19 RX ORDER — MULTIPLE VITAMINS W/ MINERALS TAB 9MG-400MCG
1 TAB ORAL DAILY
Qty: 100 TABLET | Refills: 3
Start: 2018-09-19 | End: 2019-03-02

## 2018-09-19 RX ORDER — POLYETHYLENE GLYCOL 3350 17 G/17G
17 POWDER, FOR SOLUTION ORAL DAILY
Qty: 510 G | Refills: 1 | Status: SHIPPED | OUTPATIENT
Start: 2018-09-19 | End: 2019-09-10

## 2018-09-19 RX ORDER — CARBIDOPA AND LEVODOPA 25; 100 MG/1; MG/1
2 TABLET ORAL 3 TIMES DAILY
Qty: 180 TABLET | Refills: 1
Start: 2018-09-19 | End: 2019-03-12

## 2018-09-19 RX ORDER — BETAMETHASONE DIPROPIONATE 0.5 MG/G
CREAM TOPICAL
Qty: 50 G | Refills: 0
Start: 2018-09-19 | End: 2018-09-19

## 2018-09-19 RX ORDER — TRIAMCINOLONE ACETONIDE 1 MG/G
CREAM TOPICAL 2 TIMES DAILY PRN
Start: 2018-09-19 | End: 2019-03-02

## 2018-09-19 RX ORDER — SPIRONOLACTONE 25 MG/1
12.5 TABLET ORAL DAILY
Qty: 45 TABLET | Refills: 1
Start: 2018-09-19 | End: 2018-10-12

## 2018-09-19 ASSESSMENT — ENCOUNTER SYMPTOMS
JAUNDICE: 0
BOWEL INCONTINENCE: 0
HALLUCINATIONS: 0
SYNCOPE: 0
FLANK PAIN: 0
WEIGHT LOSS: 1
HYPOTENSION: 1
LEG PAIN: 0
NUMBNESS: 1
STIFFNESS: 1
EYE WATERING: 1
BLOOD IN STOOL: 0
WEAKNESS: 1
PANIC: 1
PARALYSIS: 0
RECTAL PAIN: 0
ARTHRALGIAS: 1
HOARSE VOICE: 1
DISTURBANCES IN COORDINATION: 0
CHILLS: 1
TREMORS: 0
LIGHT-HEADEDNESS: 1
DOUBLE VISION: 1
BLOATING: 1
SLEEP DISTURBANCES DUE TO BREATHING: 0
JOINT SWELLING: 1
HEADACHES: 1
BACK PAIN: 1
NERVOUS/ANXIOUS: 1
DIARRHEA: 1
FATIGUE: 1
DIZZINESS: 1
EYE REDNESS: 0
LOSS OF CONSCIOUSNESS: 0
FEVER: 0
EXERCISE INTOLERANCE: 1
DEPRESSION: 1
INSOMNIA: 1
SORE THROAT: 0
VOMITING: 0
SINUS CONGESTION: 0
SEIZURES: 0
INCREASED ENERGY: 1
SINUS PAIN: 0
PALPITATIONS: 0
SMELL DISTURBANCE: 1
ALTERED TEMPERATURE REGULATION: 1
POLYDIPSIA: 0
CONSTIPATION: 1
POOR WOUND HEALING: 0
NAIL CHANGES: 1
SKIN CHANGES: 0
DECREASED APPETITE: 1
MUSCLE WEAKNESS: 1
DECREASED CONCENTRATION: 1
DYSURIA: 0
NECK MASS: 0
ABDOMINAL PAIN: 1
TINGLING: 1
MUSCLE CRAMPS: 1
TASTE DISTURBANCE: 1
HEMATURIA: 0
MEMORY LOSS: 0
TROUBLE SWALLOWING: 0
POLYPHAGIA: 0
WEIGHT GAIN: 0
HYPERTENSION: 1
EYE PAIN: 0
SPEECH CHANGE: 0
DIFFICULTY URINATING: 1
NAUSEA: 1
NECK PAIN: 1
MYALGIAS: 1
ORTHOPNEA: 0
EYE IRRITATION: 1
HEARTBURN: 1
NIGHT SWEATS: 0

## 2018-09-19 ASSESSMENT — PAIN SCALES - GENERAL: PAINLEVEL: SEVERE PAIN (7)

## 2018-09-19 NOTE — PROGRESS NOTES
"Department of Neurology  Movement Disorders Division   Initial Clinic Evaluation     Patient: Mehreen Camara   MRN: [id number]   : 1940   Date of Visit: 2018     Referring Physician: Omi    Reason for Referral: Parkinsonism, ?MSA    Impression:  1.  Parkinsonism without tremor  2.  Orthostatic hypotension    This patient certainly poses a dilemma.  Her major initial symptom was severe orthostatic hypotension.  She then developed mild parkinsonism.  This appears to be levodopa responsive at least initially.  I can see Dr. Braga's difficulty in deciding if this is  multiple system atrophy or Parkinson's disease with dysautonomia.  It is very difficult to tell these 2 apart early on.  Biomarkers that we can use include 1) the MRI looking for pontine signal change-\"hot cross bun sign\", 2) autonomic testing, 3) response to levodopa and 4) rate of progression.    Recommendations:  1.  I will call [facility] and see if I can obtain the MRI to look for signs of MSA.  2.  I will send the patient to Dr. Ba's lab at [facility] for autonomic testing.  3.  For the present we will continue the current dose of carbidopa levodopa, 25/100, 2 tablets 3 times a day.  4.  I will see the patient back with the autonomic testing      Please call or write with questions or concerns,    Sincerely yours,    Chris Monterroso MD      History of Present Illness  Ms. Camara is a 78 year old female who is right-handed.  She worked in various jobs.  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  She has 5 children in the [place] area.    The patient's paternal uncle had Parkinson's disease.    The patient has had a poor sense of smell.  She has been talking in her sleep for a long time.  Her  noticed this years ago.  She would scream or streak in her sleep.  On one occasion she fell out of bed fighting in her sleep.    Looking at her records in late  she began to feel weak.  She was found " to have significant orthostatic hypotension.  Her blood pressure on one occasion was 70 systolic in the standing position.  She was hospitalized on 2 occasions with weakness and orthostatic hypotension.  The first was in May 2018.  At that time she saw Dr. Braga and there was a question if she had Parkinson's disease or MSA.  She was hospitalized again on July 4 of 2018.  Again she had orthostatic hypotension.    He was found during the hospitalizations that she had symptoms and signs of parkinsonism.  She did not have tremor.  She did not have shuffling gait she says.  However she had gait freezing where her steps would freezing her feet would go to command.  She was having difficulty getting out of a chair.  She had a hard time getting out of bed.  She had no falls thankfully.  Her handwriting was bad and small.  In terms of her autonomic nervous systems besides the orthostatic hypotension she had some small incontinence of her bladder.  However she normally is not incontinent.  She does have constipation.She did not have stridor.    The patient was started on carbidopa levodopa.  The dose was escalated to carbidopa/levodopa, 25/100, 2 tablets 3 times a day.  Both she and her daughter say she is improved.  She no longer freezes with her walking.  Her handwriting has normalized.  She still feels weak however.  Dr. Braga has referred her to have us consider whether this looks to us more like Parkinson's disease or multiple system atrophy.    The patient did have a MRI of the head in July 2018.  By report there is white matter disease but no specific lesions.  In particular there was no mention of pontine signal change or cerebellar atrophy.    I did check her blood pressure today.  In the lying position her blood pressure was 187/87 with a pulse of 132.  After standing for 1 minute her blood pressure was 134/83 with a pulse of 95.    Past Medical History:   Past Medical History:   Diagnosis Date     Adrenal  adenoma     stable, thought not to be hormonally active     Arthritis      Depressive disorder      Hypertension      Rheumatic fever     thought to have had as a child     Small bowel obstruction      Thyroid disease        Past Surgical History:   Past Surgical History:   Procedure Laterality Date     AAA REPAIR  2015    wtih bifurcation graft     CARPAL TUNNEL RELEASE RT/LT Bilateral 2013     HYSTERECTOMY  2013     JOINT REPLACEMENT Right 2003     ORTHOPEDIC SURGERY       SPINE SURGERY  1981    cervical spine fusion     THYROIDECTOMY  2011       Medications:  Current Outpatient Prescriptions   Medication Sig Dispense Refill     acetaminophen (TYLENOL) 500 MG tablet Take 1-2 tablets (500-1,000 mg) by mouth 3 times daily as needed for mild pain 60 tablet 3     carbidopa-levodopa (SINEMET)  MG per tablet Take 2 tablets by mouth 3 times daily 180 tablet 1     cholecalciferol 2000 units tablet Take 1 tablet by mouth daily 30 tablet 3     citalopram (CELEXA) 20 MG tablet Take 1 tablet (20 mg) by mouth daily 90 tablet 1     diclofenac (VOLTAREN) 1 % GEL topical gel Place onto the skin 4 times daily QID prn to back and neck       gabapentin (NEURONTIN) 100 MG capsule Take 1 capsule (100 mg) by mouth 2 times daily 90 capsule 1     gabapentin (NEURONTIN) 100 MG capsule Take 2 capsules (200 mg) by mouth At Bedtime 90 capsule 1     levothyroxine (SYNTHROID/LEVOTHROID) 137 MCG tablet Take 1 tablet (137 mcg) by mouth daily 90 tablet 3     Lidocaine (LIDOCARE) 4 % Patch Place 1 patch onto the skin every 24 hours 30 patch 1     losartan (COZAAR) 50 MG tablet Take 1 tablet (50 mg) by mouth 2 times daily 90 tablet 1     multivitamin, therapeutic with minerals (MULTI-VITAMIN) TABS tablet Take 1 tablet by mouth daily 100 tablet 3     omeprazole (PRILOSEC) 20 MG CR capsule Take 1 capsule (20 mg) by mouth 2 times daily 90 capsule 3     polyethylene glycol (MIRALAX) powder Take 17 g by mouth daily 510 g 1     polyethylene glycol  0.4%- propylene glycol 0.3% (SYSTANE ULTRA) 0.4-0.3 % SOLN ophthalmic solution Place 1 drop into both eyes 4 times daily as needed for dry eyes       ranitidine (ZANTAC) 150 MG capsule Take 1 capsule (150 mg) by mouth At Bedtime 180 capsule 3     senna (SENOKOT) 8.6 MG tablet Take 1 tablet by mouth daily as needed for constipation 120 tablet 1     spironolactone (ALDACTONE) 25 MG tablet Take 0.5 tablets (12.5 mg) by mouth daily 45 tablet 1     triamcinolone (KENALOG) 0.1 % cream Apply topically 2 times daily as needed for irritation BID prn to feet            Movement Disorder-related Medications                                                                                                                                                             Allergies: is allergic to penicillins; aspirin; atenolol; atorvastatin; bupropion; clindamycin; codeine; ibuprofen; lovastatin; and cephalexin.    Social History:   Social History     Social History     Marital status:      Spouse name: N/A     Number of children: N/A     Years of education: N/A     Social History Main Topics     Smoking status: Former Smoker     Packs/day: 0.50     Types: Cigarettes     Quit date: 12/11/1994     Smokeless tobacco: Never Used     Alcohol use No     Drug use: No     Sexual activity: Not Currently     Other Topics Concern     None     Social History Narrative       Family History:  Family History   Problem Relation Age of Onset     Hypertension Mother      Coronary Artery Disease Mother      Coronary Artery Disease Father      Other Cancer Brother      Parkinsonism Other        ROS:  General:  Fever : no, Chills: no, Sweats: no, Fatigue: no, ,Weight loss: no  Cardiovascular  Chest Pains: no, Palpitations: no, Edema: no, Shortness of breath: no  Respiratory  Cough: no  Gastrointestinal  Nausea: no, Vomiting: no, Diarrhea: no, Constipation: no  Genitourinary  Dysuria: no, Frequency: no, Urgency: no,  Nocturia: no, Incontinence: no  Women:  Abnormal vaginal bleeding: no  Musculoskeletal  Back pain: no, Neck Pain: no  Joint pain, swelling, redness: no, Stiffness: no  Skin  Rash: no, Itching: no,  Suspicious lesions: no  Psychiatric  Depression: no, Anxiety/Panic: no  Endocrine  Cold intolerance: no, Heat intolerance: no, Excessive thirst: no  Hematologic/LymphatiAbnormal bruising, bleeding: no ,Enlarged lymph nodes: {.:[id number]  Allergic/Immunologic  Hives (Urticaria): no, HIV exposure: no    Neurologic  Visual loss: no, Double vision: no, Headache: no, Loss of consciousness:  no, Seizure: no, Fainting (syncope): no, Dysarthria: no, Dysphagia:  no, Vertigo:  no, Weakness: YES, Atrophy: no, Twitches: no, Lhermitte's: no, Numbness or tingling: no, Handwriting change: YES, Tremors: no, Involuntary movements: no, Imbalance: no, Abnormal gait:  YES, Falls :no, Memory loss: no, RBD: YES, Sleep disorder: no, Hallucination: no, Loss of concentration: no,  Behavioral change: no,  Loss of motivation: no      Comprehensive Neurologic Exam    Mental Status Exam   The patient is alert, oriented and exhibits no difficulty with cognition or memory.  A formal short test of mental status was not performed.     Neurovascular         Speech and Language   Right Left     Carotid Bruit Absent Absent  Speech is normal.   Dorsalis Pedis Pulse Normal Normal  Description   Posterior Tibial Pulse Normal Normal  Language is normal.     Cranial Nerve Exam                 Right Left   Right Left   II                                        Normal Normal  Hearing (VIII) Normal Normal      III, IV, V!                   Normal Normal  Nystagmus (VIII) Normal Normal   EOM description                              Gag (IX, X) Normal Normal   Facial Sensation (V) Normal Normal  SCM (XI) Normal Normal   Muscles of Mastication (V) Normal Normal  Trapezius (XI) Normal Normal   Facial Strength (VII) Normal Normal  Tongue (XII) Normal Normal     Motor Exam  Strength (*/5 grading)  Muscle                   Right Left  Muscle Right Left   Frontalis                                           Normal Normal  Iliopsoas Normal    Normal          Orbicularis Oculi                     Normal Normal  Quadrideps Normal    Normal        Orbicularis Oris                         Normal Normal  Anterior Tibial Normal Normal      Deltoid Normal Normal  Gastrocnemius Normal    Normal   Biceps Normal Normal  Extensor Hallucis Longus Normal    Normal   Triceps Normal Normal  Toe Extensors Normal    Normal   EDC Normal Normal  Toe Flexor Normal    Normal   Finger Flexors Normal Normal  Other Normal Normal   First Dorsal Interosseous Normal Normal  Other Normal Normal   Hypothenar Normal Normal  Other Normal Normal   Thenar Normal Normal  Other Normal Normal     Reflexes      Tone   Right Left   Right Left   Biceps Normal Normal  Spasticity (S)  Rigidity (R)     Triceps Normal Normal  Neck     Brachioradialis Normal Normal  Arm +1 +1   Quadriceps Normal Normal  Leg +1 +1   Ankle Normal Normal       Babkinski Flexor Flexor         AMR       Coordination   Right Left   Right Left   Fingers -1 -1  Finger nose finger Normal Normal   Hand -1 -1  Drift Normal Normal   Leg Normal Normal  Heel Arias Normal Normal   Foot -1 -1  Other     Other               Involuntary Movements    Gait and Station  None            Right Left  Stand & Sit Normal   (Movement type) Normal Normal  Gait Normal   (Movement type) Normal Normal  Tandem Normal   (Movement type) Normal Normal  Romberg Absent       Sensory Exam          Right Left    Pin Normal Normal    Vibration Normal Normal    Joint position Normal Normal    Other             Coding statement:     Duration of  Services: face-to-face 70 min.   Greater than 50% of this visit was spent in counseling and coordination of care.    Medical decision making is high due to the following components:    Number of diagnoses:[id number]  Management:Diagnostic tests orders or reviewed.Yes   Medications were prescribed.No Medications were adjusted.No  Complexity of medical data:Outside records reviewed.Yes Radiology images reviewed by myself.No Review/order clinical lab tests. No Review/order radiologyNo Discussed tests with performing physician. No Called outside records.Yes Discussed patient with another provider.No Took collateral history.YesRiskOne or more chronic illnesses with severe exacerbation, progression, or side effects of treatment.YesAcute or chronic illness or injury that poses a threat to life or bodily function.Yes  Abrupt change in neurologic status.No

## 2018-09-19 NOTE — MR AVS SNAPSHOT
After Visit Summary   9/19/2018    Mehreen Camara    MRN: [id number]           Patient Information     Date Of Birth          1940        Visit Information        Provider Department      9/19/2018 1:00 PM Chris Monterroso MD [facility] Neurology        Today's Diagnoses     Parkinsonism, unspecified Parkinsonism type (H)    -  1      Care Instructions    [place]. [place] [facility] [place]  2.  See back after autonomic tests          Follow-ups after your visit        Additional Services     NEUROLOGY ADULT REFERRAL       Your provider has referred you for the following:   Consult [facility] autonomic lab  order for autonomic tests can be faxed directly to the EMG and Autonomics Lab at [phone]  Fax med list with referral    Thanks    Please be aware that coverage of these services is subject to the terms and limitations of your health insurance plan.  Call member services at your health plan with any benefit or coverage questions.      Please bring the following with you to your appointment:    (1) Any X-Rays, CTs or MRIs which have been performed.  Contact the facility where they were done to arrange for  prior to your scheduled appointment.    (2) List of current medications  (3) This referral request   (4) Any documents/labs given to you for this referral                  Follow-up notes from your care team     Return after autonomic testing.      Who to contact     Please call your clinic at [phone] to:    Ask questions about your health    Make or cancel appointments    Discuss your medicines    Learn about your test results    Speak to your doctor            Additional Information About Your Visit        MyChart Information     [employer] is an electronic gateway that provides easy, online access to your medical records. With [employer], you can request a clinic appointment, read your test results, renew a prescription or communicate with your care team.     To sign up for MyChart visit  "the website at [email or web address]   You will be asked to enter the access code listed below, as well as some personal information. Please follow the directions to create your username and password.     Your access code is: [email or web address]  Expires: 2018  6:30 AM     Your access code will  in 90 days. If you need help or a new code, please contact your [facility] or call [phone] for assistance.        Care EveryWhere ID     This is your Care EveryWhere ID. This could be used by other organizations to access your [place] medical records  [id number]        Your Vitals Were     Pulse Height BMI (Body Mass Index)             77 1.676 m (5' 6\") 24.28 kg/m2          Blood Pressure from Last 3 Encounters:   18 129/79   18 103/88    Weight from Last 3 Encounters:   18 68.2 kg (150 lb 6.4 oz)   18 67.1 kg (148 lb)              We Performed the Following     NEUROLOGY ADULT REFERRAL        Primary Care Provider Office Phone # Fax #    Cathy HAILEE Sargent [phone]       [address] [address]        Equal Access to Services     EDDA YANG : Hadii aad ku hadasho Soomaali, waaxda luqadaha, qaybta kaalmada adeegyada, waxay idiin haycody durham . So [facility] [phone].    ATENCIÓN: Si habla español, tiene a camacho disposición servicios gratuitos de asistencia lingüística. Llame al [phone].    We comply with applicable federal civil rights laws and [place] laws. We do not discriminate on the basis of race, color, national origin, age, disability, sex, sexual orientation, or gender identity.            Thank you!     Thank you for choosing [facility] NEUROLOGY  for your care. Our goal is always to provide you with excellent care. Hearing back from our patients is one way we can continue to improve our services. Please take a few minutes to complete the written survey that you may receive in the mail after " your visit with us. Thank you!             Your Updated Medication List - Protect others around you: Learn how to safely use, store and throw away your medicines at www.disposemymeds.org.          This list is accurate as of 9/19/18  3:03 PM.  Always use your most recent med list.                   Brand Name Dispense Instructions for use Diagnosis    acetaminophen 500 MG tablet    TYLENOL    60 tablet    Take 1-2 tablets (500-1,000 mg) by mouth 3 times daily as needed for mild pain    Primary osteoarthritis involving multiple joints       carbidopa-levodopa  MG per tablet    SINEMET    180 tablet    Take 2 tablets by mouth 3 times daily    Movement disorder       cholecalciferol 2000 units tablet     30 tablet    Take 1 tablet by mouth daily    Age-related osteoporosis without current pathological fracture       citalopram 20 MG tablet    celeXA    90 tablet    Take 1 tablet (20 mg) by mouth daily    Episode of recurrent major depressive disorder, unspecified depression episode severity (H)       diclofenac 1 % Gel topical gel    VOLTAREN     Place onto the skin 4 times daily QID prn to back and neck    Primary osteoarthritis involving multiple joints       * gabapentin 100 MG capsule    NEURONTIN    90 capsule    Take 1 capsule (100 mg) by mouth 2 times daily    Neuropathic pain of both legs       * gabapentin 100 MG capsule    NEURONTIN    90 capsule    Take 2 capsules (200 mg) by mouth At Bedtime    Chronic bilateral low back pain with bilateral sciatica       levothyroxine 137 MCG tablet    SYNTHROID/LEVOTHROID    90 tablet    Take 1 tablet (137 mcg) by mouth daily    Acquired hypothyroidism       Lidocaine 4 % Patch    LIDOCARE    30 patch    Place 1 patch onto the skin every 24 hours    Primary osteoarthritis involving multiple joints       losartan 50 MG tablet    COZAAR    90 tablet    Take 1 tablet (50 mg) by mouth 2 times daily    Essential hypertension       multivitamin, therapeutic with minerals  Tabs tablet     100 tablet    Take 1 tablet by mouth daily    Takes dietary supplements       NUTRITIONAL SUPPLEMENT PO      Take by mouth daily        omeprazole 20 MG CR capsule    priLOSEC    90 capsule    Take 1 capsule (20 mg) by mouth 2 times daily    Harris's esophagus with dysplasia       polyethylene glycol 0.4%- propylene glycol 0.3% 0.4-0.3 % Soln ophthalmic solution    SYSTANE ULTRA     Place 1 drop into both eyes 4 times daily as needed for dry eyes    Dry eyes       polyethylene glycol powder    MIRALAX    510 g    Take 17 g by mouth daily    Chronic idiopathic constipation       ranitidine 150 MG capsule    ZANTAC    180 capsule    Take 1 capsule (150 mg) by mouth At Bedtime    Harris's esophagus with dysplasia       senna 8.6 MG tablet    SENOKOT    120 tablet    Take 1 tablet by mouth daily as needed for constipation    Chronic idiopathic constipation       spironolactone 25 MG tablet    ALDACTONE    45 tablet    Take 0.5 tablets (12.5 mg) by mouth daily    Essential hypertension       triamcinolone 0.1 % cream    KENALOG     Apply topically 2 times daily as needed for irritation BID prn to feet    Dermatitis       * Notice:  This list has 2 medication(s) that are the same as other medications prescribed for you. Read the directions carefully, and ask your doctor or other care provider to review them with you.

## 2018-09-19 NOTE — PATIENT INSTRUCTIONS
[place] [facility] [place].  See back after autonomic tests

## 2018-09-19 NOTE — LETTER
"2018       RE: Mehreen Camara  [place]  [address] [facility] [address]     Dear Colleague,    Thank you for referring your patient, Mehreen Camara, to the [facility] NEUROLOGY at [facility]. Please see a copy of my visit note below.    Department of Neurology  Movement Disorders Division   Initial Clinic Evaluation     Patient: Mehreen Camara   MRN: [id number]   : 1940   Date of Visit: 2018     Referring Physician: Omi    Reason for Referral: Parkinsonism, ?MSA    Impression:  1.  Parkinsonism without tremor  2.  Orthostatic hypotension    This patient certainly poses a dilemma.  Her major initial symptom was severe orthostatic hypotension.  She then developed mild parkinsonism.  This appears to be levodopa responsive at least initially.  I can see Dr. Braga's difficulty in deciding if this is  multiple system atrophy or Parkinson's disease with dysautonomia.  It is very difficult to tell these 2 apart early on.  Biomarkers that we can use include 1) the MRI looking for pontine signal change-\"hot cross bun sign\", 2) autonomic testing, 3) response to levodopa and 4) rate of progression.    Recommendations:  1.  I will call [facility] and see if I can obtain the MRI to look for signs of MSA.  2.  I will send the patient to Dr. Ba's lab at [facility] for autonomic testing.  3.  For the present we will continue the current dose of carbidopa levodopa, 25/100, 2 tablets 3 times a day.  4.  I will see the patient back with the autonomic testing      Please call or write with questions or concerns,    Sincerely yours,    Chris Monterroso MD      History of Present Illness  Ms. Camara is a 78 year old female who is right-handed.  She worked in various jobs.  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  She has 5 children in the [place] area.    The patient's paternal uncle had Parkinson's disease.    The " patient has had a poor sense of smell.  She has been talking in her sleep for a long time.  Her  noticed this years ago.  She would scream or streak in her sleep.  On one occasion she fell out of bed fighting in her sleep.    Looking at her records in late 2017 she began to feel weak.  She was found to have significant orthostatic hypotension.  Her blood pressure on one occasion was 70 systolic in the standing position.  She was hospitalized on 2 occasions with weakness and orthostatic hypotension.  The first was in May 2018.  At that time she saw Dr. Braga and there was a question if she had Parkinson's disease or MSA.  She was hospitalized again on July 4 of 2018.  Again she had orthostatic hypotension.    He was found during the hospitalizations that she had symptoms and signs of parkinsonism.  She did not have tremor.  She did not have shuffling gait she says.  However she had gait freezing where her steps would freezing her feet would go to command.  She was having difficulty getting out of a chair.  She had a hard time getting out of bed.  She had no falls thankfully.  Her handwriting was bad and small.  In terms of her autonomic nervous systems besides the orthostatic hypotension she had some small incontinence of her bladder.  However she normally is not incontinent.  She does have constipation.She did not have stridor.    The patient was started on carbidopa levodopa.  The dose was escalated to carbidopa/levodopa, 25/100, 2 tablets 3 times a day.  Both she and her daughter say she is improved.  She no longer freezes with her walking.  Her handwriting has normalized.  She still feels weak however.  Dr. Braga has referred her to have us consider whether this looks to us more like Parkinson's disease or multiple system atrophy.    The patient did have a MRI of the head in July 2018.  By report there is white matter disease but no specific lesions.  In particular there was no mention of pontine  signal change or cerebellar atrophy.    I did check her blood pressure today.  In the lying position her blood pressure was 187/87 with a pulse of 132.  After standing for 1 minute her blood pressure was 134/83 with a pulse of 95.    Past Medical History:   Past Medical History:   Diagnosis Date     Adrenal adenoma     stable, thought not to be hormonally active     Arthritis      Depressive disorder      Hypertension      Rheumatic fever     thought to have had as a child     Small bowel obstruction      Thyroid disease        Past Surgical History:   Past Surgical History:   Procedure Laterality Date     AAA REPAIR  2015    wtih bifurcation graft     CARPAL TUNNEL RELEASE RT/LT Bilateral 2013     HYSTERECTOMY  2013     JOINT REPLACEMENT Right 2003     ORTHOPEDIC SURGERY       SPINE SURGERY  1981    cervical spine fusion     THYROIDECTOMY  2011       Medications:  Current Outpatient Prescriptions   Medication Sig Dispense Refill     acetaminophen (TYLENOL) 500 MG tablet Take 1-2 tablets (500-1,000 mg) by mouth 3 times daily as needed for mild pain 60 tablet 3     carbidopa-levodopa (SINEMET)  MG per tablet Take 2 tablets by mouth 3 times daily 180 tablet 1     cholecalciferol 2000 units tablet Take 1 tablet by mouth daily 30 tablet 3     citalopram (CELEXA) 20 MG tablet Take 1 tablet (20 mg) by mouth daily 90 tablet 1     diclofenac (VOLTAREN) 1 % GEL topical gel Place onto the skin 4 times daily QID prn to back and neck       gabapentin (NEURONTIN) 100 MG capsule Take 1 capsule (100 mg) by mouth 2 times daily 90 capsule 1     gabapentin (NEURONTIN) 100 MG capsule Take 2 capsules (200 mg) by mouth At Bedtime 90 capsule 1     levothyroxine (SYNTHROID/LEVOTHROID) 137 MCG tablet Take 1 tablet (137 mcg) by mouth daily 90 tablet 3     Lidocaine (LIDOCARE) 4 % Patch Place 1 patch onto the skin every 24 hours 30 patch 1     losartan (COZAAR) 50 MG tablet Take 1 tablet (50 mg) by mouth 2 times daily 90 tablet 1      multivitamin, therapeutic with minerals (MULTI-VITAMIN) TABS tablet Take 1 tablet by mouth daily 100 tablet 3     omeprazole (PRILOSEC) 20 MG CR capsule Take 1 capsule (20 mg) by mouth 2 times daily 90 capsule 3     polyethylene glycol (MIRALAX) powder Take 17 g by mouth daily 510 g 1     polyethylene glycol 0.4%- propylene glycol 0.3% (SYSTANE ULTRA) 0.4-0.3 % SOLN ophthalmic solution Place 1 drop into both eyes 4 times daily as needed for dry eyes       ranitidine (ZANTAC) 150 MG capsule Take 1 capsule (150 mg) by mouth At Bedtime 180 capsule 3     senna (SENOKOT) 8.6 MG tablet Take 1 tablet by mouth daily as needed for constipation 120 tablet 1     spironolactone (ALDACTONE) 25 MG tablet Take 0.5 tablets (12.5 mg) by mouth daily 45 tablet 1     triamcinolone (KENALOG) 0.1 % cream Apply topically 2 times daily as needed for irritation BID prn to feet            Movement Disorder-related Medications                                                                                                                                                             Allergies: is allergic to penicillins; aspirin; atenolol; atorvastatin; bupropion; clindamycin; codeine; ibuprofen; lovastatin; and cephalexin.    Social History:   Social History     Social History     Marital status:      Spouse name: N/A     Number of children: N/A     Years of education: N/A     Social History Main Topics     Smoking status: Former Smoker     Packs/day: 0.50     Types: Cigarettes     Quit date: 12/11/1994     Smokeless tobacco: Never Used     Alcohol use No     Drug use: No     Sexual activity: Not Currently     Other Topics Concern     None     Social History Narrative       Family History:  Family History   Problem Relation Age of Onset     Hypertension Mother      Coronary Artery Disease Mother      Coronary Artery Disease Father      Other Cancer Brother      Parkinsonism Other        ROS:  General:  Fever : no, Chills: no, Sweats: no,  Fatigue: no, ,Weight loss: no  Cardiovascular  Chest Pains: no, Palpitations: no, Edema: no, Shortness of breath: no  Respiratory  Cough: no  Gastrointestinal  Nausea: no, Vomiting: no, Diarrhea: no, Constipation: no  Genitourinary  Dysuria: no, Frequency: no, Urgency: no,  Nocturia: no, Incontinence: no Women:  Abnormal vaginal bleeding: no  Musculoskeletal  Back pain: no, Neck Pain: no  Joint pain, swelling, redness: no, Stiffness: no  Skin  Rash: no, Itching: no,  Suspicious lesions: no  Psychiatric  Depression: no, Anxiety/Panic: no  Endocrine  Cold intolerance: no, Heat intolerance: no, Excessive thirst: no  Hematologic/LymphatiAbnormal bruising, bleeding: no ,Enlarged lymph nodes: {.:[id number]  Allergic/Immunologic  Hives (Urticaria): no, HIV exposure: no    Neurologic  Visual loss: no, Double vision: no, Headache: no, Loss of consciousness:  no, Seizure: no, Fainting (syncope): no, Dysarthria: no, Dysphagia:  no, Vertigo:  no, Weakness: YES, Atrophy: no, Twitches: no, Lhermitte's: no, Numbness or tingling: no, Handwriting change: YES, Tremors: no, Involuntary movements: no, Imbalance: no, Abnormal gait:  YES, Falls :no, Memory loss: no, RBD: YES, Sleep disorder: no, Hallucination: no, Loss of concentration: no,  Behavioral change: no,  Loss of motivation: no      Comprehensive Neurologic Exam    Mental Status Exam   The patient is alert, oriented and exhibits no difficulty with cognition or memory.  A formal short test of mental status was not performed.     Neurovascular         Speech and Language   Right Left     Carotid Bruit Absent Absent  Speech is normal.   Dorsalis Pedis Pulse Normal Normal  Description   Posterior Tibial Pulse Normal Normal  Language is normal.     Cranial Nerve Exam                 Right Left   Right Left   II                                        Normal Normal  Hearing (VIII) Normal Normal      III, IV, V!                   Normal Normal  Nystagmus (VIII) Normal Normal   EOM  description                              Gag (IX, X) Normal Normal   Facial Sensation (V) Normal Normal  SCM (XI) Normal Normal   Muscles of Mastication (V) Normal Normal  Trapezius (XI) Normal Normal   Facial Strength (VII) Normal Normal  Tongue (XII) Normal Normal     Motor Exam  Strength (*/5 grading)  Muscle                  Right Left  Muscle Right Left   Frontalis                                           Normal Normal  Iliopsoas Normal    Normal          Orbicularis Oculi                     Normal Normal  Quadrideps Normal    Normal        Orbicularis Oris                         Normal Normal  Anterior Tibial Normal Normal      Deltoid Normal Normal  Gastrocnemius Normal    Normal   Biceps Normal Normal  Extensor Hallucis Longus Normal    Normal   Triceps Normal Normal  Toe Extensors Normal    Normal   EDC Normal Normal  Toe Flexor Normal    Normal   Finger Flexors Normal Normal  Other Normal Normal   First Dorsal Interosseous Normal Normal  Other Normal Normal   Hypothenar Normal Normal  Other Normal Normal   Thenar Normal Normal  Other Normal Normal     Reflexes      Tone   Right Left   Right Left   Biceps Normal Normal  Spasticity (S)  Rigidity (R)     Triceps Normal Normal  Neck     Brachioradialis Normal Normal  Arm +1 +1   Quadriceps Normal Normal  Leg +1 +1   Ankle Normal Normal       Babkinski Flexor Flexor         AMR       Coordination   Right Left   Right Left   Fingers -1 -1  Finger nose finger Normal Normal   Hand -1 -1  Drift Normal Normal   Leg Normal Normal  Heel Arias Normal Normal   Foot -1 -1  Other     Other               Involuntary Movements    Gait and Station  None            Right Left  Stand & Sit Normal   (Movement type) Normal Normal  Gait Normal   (Movement type) Normal Normal  Tandem Normal   (Movement type) Normal Normal  Romberg Absent       Sensory Exam          Right Left    Pin Normal Normal    Vibration Normal Normal    Joint position Normal Normal    Other        Coding  statement:     Duration of  Services: face-to-face 70 min.   Greater than 50% of this visit was spent in counseling and coordination of care.    Medical decision making is high due to the following components:    Number of diagnoses:[id number]  Management:Diagnostic tests orders or reviewed.Yes  Medications were prescribed.No Medications were adjusted.No  Complexity of medical data:Outside records reviewed.Yes Radiology images reviewed by myself.No Review/order clinical lab tests. No Review/order radiologyNo Discussed tests with performing physician. No Called outside records.Yes Discussed patient with another provider.No Took collateral history.YesRiskOne or more chronic illnesses with severe exacerbation, progression, or side effects of treatment.YesAcute or chronic illness or injury that poses a threat to life or bodily function.Yes  Abrupt change in neurologic status.No

## 2018-09-20 ENCOUNTER — HOME CARE/HOSPICE - HEALTHEAST (OUTPATIENT)
Dept: HOME HEALTH SERVICES | Facility: HOME HEALTH | Age: 78
End: 2018-09-20

## 2018-09-20 ENCOUNTER — TELEPHONE (OUTPATIENT)
Dept: NEUROLOGY | Facility: CLINIC | Age: 78
End: 2018-09-20

## 2018-09-20 ENCOUNTER — TRANSFERRED RECORDS (OUTPATIENT)
Dept: HEALTH INFORMATION MANAGEMENT | Facility: CLINIC | Age: 78
End: 2018-09-20

## 2018-09-20 NOTE — TELEPHONE ENCOUNTER
"Called [facility] film room. They are pushing the images of the MRI Brain from 7/2018. Called [place] filmroom and spoke to \"[place]\". I let him know to expect these images shortly.    Faxed autonomic testing referral to [facility] [place]. Kayden Mitchell, per Dr. Monterroso's office visit note from yesterday.    Fax [phone]          "

## 2018-09-21 ENCOUNTER — TELEPHONE (OUTPATIENT)
Dept: NEUROLOGY | Facility: CLINIC | Age: 78
End: 2018-09-21

## 2018-09-21 ENCOUNTER — HOME CARE/HOSPICE - HEALTHEAST (OUTPATIENT)
Dept: HOME HEALTH SERVICES | Facility: HOME HEALTH | Age: 78
End: 2018-09-21

## 2018-09-21 NOTE — TELEPHONE ENCOUNTER
Received fac from patient's LTC facility requesting clarification on Gabapentin prescriptions.      I called the facility and left a voicemail stating that our provider wasn't the one who prescribed these medications so they will have to call [facility] instead.     Left callback number.

## 2018-09-24 ENCOUNTER — HOME CARE/HOSPICE - HEALTHEAST (OUTPATIENT)
Dept: HOME HEALTH SERVICES | Facility: HOME HEALTH | Age: 78
End: 2018-09-24

## 2018-09-25 ENCOUNTER — COMMUNICATION - HEALTHEAST (OUTPATIENT)
Dept: NURSING | Facility: CLINIC | Age: 78
End: 2018-09-25

## 2018-09-28 ENCOUNTER — HOME CARE/HOSPICE - HEALTHEAST (OUTPATIENT)
Dept: HOME HEALTH SERVICES | Facility: HOME HEALTH | Age: 78
End: 2018-09-28

## 2018-10-01 ENCOUNTER — COMMUNICATION - HEALTHEAST (OUTPATIENT)
Dept: HOME HEALTH SERVICES | Facility: HOME HEALTH | Age: 78
End: 2018-10-01

## 2018-10-01 ENCOUNTER — HOME CARE/HOSPICE - HEALTHEAST (OUTPATIENT)
Dept: HOME HEALTH SERVICES | Facility: HOME HEALTH | Age: 78
End: 2018-10-01

## 2018-10-01 DIAGNOSIS — I95.1 ORTHOSTATIC HYPOTENSION: ICD-10-CM

## 2018-10-02 ENCOUNTER — HOME CARE/HOSPICE - HEALTHEAST (OUTPATIENT)
Dept: HOME HEALTH SERVICES | Facility: HOME HEALTH | Age: 78
End: 2018-10-02

## 2018-10-03 ENCOUNTER — TELEPHONE (OUTPATIENT)
Dept: GERIATRICS | Facility: CLINIC | Age: 78
End: 2018-10-03

## 2018-10-03 ENCOUNTER — COMMUNICATION - HEALTHEAST (OUTPATIENT)
Dept: FAMILY MEDICINE | Facility: CLINIC | Age: 78
End: 2018-10-03

## 2018-10-03 NOTE — TELEPHONE ENCOUNTER
Prior Authorization Retail Medication Request    Medication/Dose: Lidocaine patch 5%  ICD code (if different than what is on RX):    Previously Tried and Failed:    Rationale:      Insurance Name:  [employer] Part D  Insurance ID:  [id number]      Pharmacy Information (if different than what is on RX)  Name:  [facility]  Phone:  Fax number:  [phone]

## 2018-10-03 NOTE — TELEPHONE ENCOUNTER
PRIOR AUTHORIZATION DENIED    Medication: Lidocaine patch 5%    Denial Date: 10/3/2018    Denial Rational:  Medication is only covered for postherpetic neuralgia, diabetic neuropathy, and neuropathic pain due to cancer.             Appeal Information: n/a

## 2018-10-03 NOTE — TELEPHONE ENCOUNTER
Central Prior Authorization Team   Phone: [phone]    PA Initiation    Medication: Lidocaine patch 5%  Insurance Company: [employer] - Phone [phone] Fax [phone]  Pharmacy Filling the Rx: [facility] - [place] - [address]  Filling Pharmacy Phone: [phone]  Filling Pharmacy Fax:    Start Date: 10/3/2018

## 2018-10-04 ENCOUNTER — HOME CARE/HOSPICE - HEALTHEAST (OUTPATIENT)
Dept: HOME HEALTH SERVICES | Facility: HOME HEALTH | Age: 78
End: 2018-10-04

## 2018-10-05 ENCOUNTER — COMMUNICATION - HEALTHEAST (OUTPATIENT)
Dept: NURSING | Facility: CLINIC | Age: 78
End: 2018-10-05

## 2018-10-08 ENCOUNTER — HOME CARE/HOSPICE - HEALTHEAST (OUTPATIENT)
Dept: HOME HEALTH SERVICES | Facility: HOME HEALTH | Age: 78
End: 2018-10-08

## 2018-10-10 ENCOUNTER — HOME CARE/HOSPICE - HEALTHEAST (OUTPATIENT)
Dept: HOME HEALTH SERVICES | Facility: HOME HEALTH | Age: 78
End: 2018-10-10

## 2018-10-11 ENCOUNTER — RECORDS - HEALTHEAST (OUTPATIENT)
Dept: LAB | Facility: CLINIC | Age: 78
End: 2018-10-11

## 2018-10-11 ENCOUNTER — TRANSFERRED RECORDS (OUTPATIENT)
Dept: HEALTH INFORMATION MANAGEMENT | Facility: CLINIC | Age: 78
End: 2018-10-11

## 2018-10-11 LAB
ALBUMIN UR-MCNC: NEGATIVE MG/DL
APPEARANCE UR: CLEAR
BACTERIA #/AREA URNS HPF: ABNORMAL HPF
BILIRUB UR QL STRIP: NEGATIVE
COLOR UR AUTO: YELLOW
GLUCOSE UR STRIP-MCNC: NEGATIVE MG/DL
HGB UR QL STRIP: NEGATIVE
KETONES UR STRIP-MCNC: NEGATIVE MG/DL
LEUKOCYTE ESTERASE UR QL STRIP: ABNORMAL
NITRATE UR QL: NEGATIVE
PH UR STRIP: 6 [PH] (ref 4.5–8)
RBC #/AREA URNS AUTO: ABNORMAL HPF
SP GR UR STRIP: 1.02 (ref 1–1.03)
SQUAMOUS #/AREA URNS AUTO: ABNORMAL LPF
UROBILINOGEN UR STRIP-ACNC: ABNORMAL
WBC #/AREA URNS AUTO: ABNORMAL HPF

## 2018-10-12 ENCOUNTER — ASSISTED LIVING VISIT (OUTPATIENT)
Dept: GERIATRICS | Facility: CLINIC | Age: 78
End: 2018-10-12
Payer: COMMERCIAL

## 2018-10-12 ENCOUNTER — TRANSFERRED RECORDS (OUTPATIENT)
Dept: HEALTH INFORMATION MANAGEMENT | Facility: CLINIC | Age: 78
End: 2018-10-12

## 2018-10-12 ENCOUNTER — HOME CARE/HOSPICE - HEALTHEAST (OUTPATIENT)
Dept: HOME HEALTH SERVICES | Facility: HOME HEALTH | Age: 78
End: 2018-10-12

## 2018-10-12 VITALS
BODY MASS INDEX: 23.76 KG/M2 | WEIGHT: 147.2 LBS | RESPIRATION RATE: 16 BRPM | OXYGEN SATURATION: 93 % | TEMPERATURE: 98.5 F | HEART RATE: 78 BPM | DIASTOLIC BLOOD PRESSURE: 77 MMHG | SYSTOLIC BLOOD PRESSURE: 121 MMHG

## 2018-10-12 DIAGNOSIS — N90.7 SEBACEOUS CYST OF LABIA: Primary | ICD-10-CM

## 2018-10-12 DIAGNOSIS — M54.41 CHRONIC BILATERAL LOW BACK PAIN WITH BILATERAL SCIATICA: ICD-10-CM

## 2018-10-12 DIAGNOSIS — M15.0 PRIMARY OSTEOARTHRITIS INVOLVING MULTIPLE JOINTS: ICD-10-CM

## 2018-10-12 DIAGNOSIS — G25.9 MOVEMENT DISORDER: ICD-10-CM

## 2018-10-12 DIAGNOSIS — K59.04 CHRONIC IDIOPATHIC CONSTIPATION: ICD-10-CM

## 2018-10-12 DIAGNOSIS — F51.01 PRIMARY INSOMNIA: ICD-10-CM

## 2018-10-12 DIAGNOSIS — F43.22 ADJUSTMENT DISORDER WITH ANXIOUS MOOD: ICD-10-CM

## 2018-10-12 DIAGNOSIS — G89.29 CHRONIC BILATERAL LOW BACK PAIN WITH BILATERAL SCIATICA: ICD-10-CM

## 2018-10-12 DIAGNOSIS — M54.42 CHRONIC BILATERAL LOW BACK PAIN WITH BILATERAL SCIATICA: ICD-10-CM

## 2018-10-12 DIAGNOSIS — I95.1 ORTHOSTATIC HYPOTENSION DYSAUTONOMIC SYNDROME: ICD-10-CM

## 2018-10-12 DIAGNOSIS — R32 URINARY INCONTINENCE IN FEMALE: ICD-10-CM

## 2018-10-12 LAB — BACTERIA SPEC CULT: NORMAL

## 2018-10-12 PROCEDURE — 99355: CPT | Performed by: NURSE PRACTITIONER

## 2018-10-12 RX ORDER — BISACODYL 10 MG
10 SUPPOSITORY, RECTAL RECTAL DAILY PRN
COMMUNITY
End: 2019-03-02

## 2018-10-12 NOTE — PROGRESS NOTES
[place] [facility]    Chief Complaint   Patient presents with     RECHECK       [place] Medical Record Number:  [id number]  [facility][id number]    HPI:    Mehreen Camara is a 78 year old  (1940), who is being seen today for an episodic care visit.  HPI information obtained from: facility chart records, facility staff, patient report and [facility] chart review.    Today's concern is:     Sebaceous cyst of labia  Orthostatic hypotension dysautonomic syndrome (H)  Adjustment disorder with anxious mood  Movement disorder  Urinary incontinence in female  Chronic idiopathic constipation  Chronic bilateral low back pain with bilateral sciatica  Primary osteoarthritis involving multiple joints  Primary insomnia     Mehreen present with several problems today, including pain in the perineal region, rash in the perianal region, and diarrhea, abdominal pain, anxiety, needs medications reviewed for autonomic testing planned next week. She is very anxious about all these concerns.     ALLERGIES: Penicillins; Aspirin; Atenolol; Atorvastatin; Bupropion; Clindamycin; Codeine; Ibuprofen; Lovastatin; and Cephalexin  Past Medical, Surgical, Family and Social History reviewed and updated in [facility].    Current Outpatient Prescriptions   Medication Sig Dispense Refill     acetaminophen (TYLENOL) 500 MG tablet Take 1-2 tablets (500-1,000 mg) by mouth 3 times daily as needed for mild pain 60 tablet 3     bisacodyl (DULCOLAX) 10 MG Suppository Place 10 mg rectally daily as needed for constipation       carbidopa-levodopa (SINEMET)  MG per tablet Take 2 tablets by mouth 3 times daily 180 tablet 1     cholecalciferol 2000 units tablet Take 1 tablet by mouth daily 30 tablet 3     citalopram (CELEXA) 20 MG tablet Take 1 tablet (20 mg) by mouth daily 90 tablet 1     diclofenac (VOLTAREN) 1 % GEL topical gel Place onto the skin 4 times daily QID prn to back and  neck       gabapentin (NEURONTIN) 100 MG capsule Take 2 capsules (200 mg) by mouth At Bedtime 90 capsule 1     levothyroxine (SYNTHROID/LEVOTHROID) 137 MCG tablet Take 1 tablet (137 mcg) by mouth daily 90 tablet 3     multivitamin, therapeutic with minerals (MULTI-VITAMIN) TABS tablet Take 1 tablet by mouth daily 100 tablet 3     Nutritional Supplements (NUTRITIONAL SUPPLEMENT PO) Take by mouth daily       omeprazole (PRILOSEC) 20 MG CR capsule Take 1 capsule (20 mg) by mouth 2 times daily 90 capsule 3     polyethylene glycol (MIRALAX) powder Take 17 g by mouth daily 510 g 1     polyethylene glycol 0.4%- propylene glycol 0.3% (SYSTANE ULTRA) 0.4-0.3 % SOLN ophthalmic solution Place 1 drop into both eyes 4 times daily as needed for dry eyes       ranitidine (ZANTAC) 150 MG capsule Take 1 capsule (150 mg) by mouth At Bedtime 180 capsule 3     senna (SENOKOT) 8.6 MG tablet Take 1 tablet by mouth daily as needed for constipation 120 tablet 1     TRAMADOL HCL PO Take 25 mg by mouth every 6 hours as needed for moderate to severe pain       triamcinolone (KENALOG) 0.1 % cream Apply topically 2 times daily as needed for irritation BID prn to feet       Zinc Oxide (DESITIN) 13 % CREA Externally apply topically 2 times daily       [DISCONTINUED] gabapentin (NEURONTIN) 100 MG capsule Take 1 capsule (100 mg) by mouth 2 times daily 90 capsule 1     Medications reviewed:  Medications reconciled to facility chart and changes were made to reflect current medications as identified as above med list. Below are the changes that were made:   Medications stopped since last EPIC medication reconciliation:   There are no discontinued medications.    Medications started since last [facility] medication reconciliation:  No orders of the defined types were placed in this encounter.    REVIEW OF SYSTEMS:  Mehreen Camara complains of significant generalized pain, located in back, perineal area, shoulders, no chest pain or pressure, no shortness  "of breath. Sleep is fair to poor per her report, appetite fair to good. Mehreen Camara does complain of bowel changes, has diarrhea today. S/he does not complain of bladder changes, no dysuria but does have perineal burning.  All other systems reviewed and are negative unless otherwise noted in HPI.     Physical Exam:  /77  Pulse 78  Temp 98.5  F (36.9  C)  Resp 16  Wt 147 lb 3.2 oz (66.8 kg)  SpO2 93%  BMI 23.76 kg/m2  GENERAL APPEARANCE:  Alert, oriented, anxious, cooperative  ENT:  Mouth and posterior oropharynx normal, moist mucous membranes  EYES:  EOM, conjunctivae, lids, pupils and irises normal  RESP:  respiratory effort and palpation of chest normal, lungs clear to auscultation , no respiratory distress  CV:  Palpation and auscultation of heart done , regular rate and rhythm, no murmur, rub, or gallop, no edema  ABDOMEN:  normal bowel sounds, tender to touch, no hepatosplenomegaly  :    Examination of external genitalia is normal, has x2 yellowish firm cysts on labia-L labia with 0.75 cm firm yellowish nodule which is not tender to touch; R labia with 1.5 cm firm nodule whihc is yellowish and not tender.  neither nodule has any open areas, not able to express any matter from the nodule.  mild external hemorrhoids noted in rectum.  remainder of genital region within normal limits.   M/S:   Gait and station abnormal using walker for short distances.   SKIN:  nodules on labia as noted above. Diffuse macular rash in perianal area, without open skin   NEURO:   Cranial nerves 2-12 are normal tested and grossly at patient's baseline  PSYCH:  normal insight, judgement and memory, insight and judgement impaired, minor forgetfulness    Recent Labs:   No Recent Labs    Assessment/Plan:  Sebaceous cyst of labia  She reports she has had these two nodules \"for years\" and reports they have not changed in size.  She reports they do not cause her pain normally.  No matter is expressible from the cysts.  " "Appear to be sebaceous in nature, not inflamed and without surrounding erythema.   -watchful waiting    Orthostatic hypotension dysautonomic syndrome (H)  Noted to have significantly low BP much of the time.  She takes her BP BID, trends systolic <110 most of the time, with diastolic <60 most of the time.  She will occasionally report a higher BP-highest noted in last 1 month 184/204.  She has not taken losartan for at least 2 weeks.  She is continuing to take spironolactone.  She reports symptomatic hypotensive episodes with dizziness and light-headed-ness.  -discontinue losartan and sprionolactone  -monitor BP BID-she will continue to take her BP    Adjustment disorder with anxious mood  Currently on celexa at [facility]. Her anxiety is not well controlled.  Family reports she has taken several different medications over the years with minimal help.  Mehreen reports she used to take \"atarax\" with good relief. Extensive review of Care Everywhere does not indicate other antidepressants she has used in the recent past.  She has autonomic testing planned for next week. It is likely not beneficial to make a change at this time, so will hold off, but certainly could try a different serotonin specific reuptake inhibitor, would consider zoloft as this may help with her anxiety more than celexa.   -follow up to consider changing antidepressants    Movement disorder  Following with [facility], has autonomic testing next week to assist in further diagnosis of movement disorder.  She is taking sinemet TID, and finds this is helpful with her ambulation.    Future Appointments  Date Time Provider Department Center   11/6/2018 1:30 PM Chris Monterroso MD [facility]        Urinary incontinence in female  Family reports some mild incontinence at times, which contributes to perineal rash, pain.  She is on MA, so would qualify for incontinence products.  Rx written today, and family will work with financial worker / case " "manager to get needed supplies.     Chronic idiopathic constipation  Diarrhea  Patient with long history significant for constipation, diarrhea and abdominal pain.  I suspect she has some component of IBS, though I do not have definitive diagnosis.  I suspect this is exacerbated by anxiety.  She is currently on miralax daily, senna s 2 per day, and reports ongoing constipation.   -increase senna s to 3 tabs daily  -consider decrease of miralax to 1/2 dose daily.     Chronic bilateral low back pain with bilateral sciatica  Primary osteoarthritis involving multiple joints  Chronic LBP, shoulder pain, knee pain.  She reports diffuse pain not well controlled with tylenol.  Will trial low dose tramadol to see if this improves pain control and symptoms.     Primary insomnia  She reports difficulty sleeping, \"thinking too much\".  I suspect this is also related to anxiety and will continue to monitor, sleep may improve with change of antidepressant, which we will address later in the month.        Orders:  1. discontinue Spironolactone  2. discontinue Losartan  3. discontinue Gabapentin 100 BID ( Continue Gabapentin 200 at bedtime) - she does not find it helpful, and reports it makes her tired during the day  4. discontinue Lidocaine patches (not covered by insurance)  5. Tramadol 50 mg give 1/2 tab (25 mg) Q6 hours PRN PO Dx: Pain  6. Flat plate abdominal x ray Dx: Constipation, R/O ileus  7. In preparation for autonomic testings   -follow instructions on form for bathing and caffeine use.   -No PRN or scheduled tylenol on 10/17 or 10/18  -No PRN Tramadol 10/17 or 10/18  -No Celexa 10/17 or   -Resume all scheduled medications after return from test    Phone call to daughterIrasema, and discussed multiple medical comorbid conditions, status, plans, and next steps.  She is in agreement with plans as outlined above.     Time in:  1:30 pm  Time out: 2:45 pm    Electronically signed by  HAILEE Contreras CNP  "

## 2018-10-15 ENCOUNTER — HOME CARE/HOSPICE - HEALTHEAST (OUTPATIENT)
Dept: HOME HEALTH SERVICES | Facility: HOME HEALTH | Age: 78
End: 2018-10-15

## 2018-10-16 ENCOUNTER — HOME CARE/HOSPICE - HEALTHEAST (OUTPATIENT)
Dept: HOME HEALTH SERVICES | Facility: HOME HEALTH | Age: 78
End: 2018-10-16

## 2018-10-16 ENCOUNTER — COMMUNICATION - HEALTHEAST (OUTPATIENT)
Dept: NURSING | Facility: CLINIC | Age: 78
End: 2018-10-16

## 2018-10-17 ENCOUNTER — HOME CARE/HOSPICE - HEALTHEAST (OUTPATIENT)
Dept: HOME HEALTH SERVICES | Facility: HOME HEALTH | Age: 78
End: 2018-10-17

## 2018-10-18 ENCOUNTER — MEDICAL CORRESPONDENCE (OUTPATIENT)
Dept: HEALTH INFORMATION MANAGEMENT | Facility: CLINIC | Age: 78
End: 2018-10-18

## 2018-10-18 ENCOUNTER — TRANSFERRED RECORDS (OUTPATIENT)
Dept: HEALTH INFORMATION MANAGEMENT | Facility: CLINIC | Age: 78
End: 2018-10-18

## 2018-10-19 ENCOUNTER — HOME CARE/HOSPICE - HEALTHEAST (OUTPATIENT)
Dept: HOME HEALTH SERVICES | Facility: HOME HEALTH | Age: 78
End: 2018-10-19

## 2018-10-22 NOTE — PROGRESS NOTES
[place] [facility]    Chief Complaint   Patient presents with     ROGER       [place] Medical Record Number:  [id number]  [facility][id number]    HPI:    Mehreen Camara is a 78 year old  (1940), who is being seen today for an episodic care visit.  HPI information obtained from: facility chart records, facility staff, patient report and [facility] chart review.Today's concern is:     Chronic bilateral low back pain with bilateral sciatica  Adjustment disorder with anxious mood  Movement disorder     Mehreen reports chronic pain continues to her back, however she did not tolerate the tramadol, and has a history of allergy to ibuprofen, codeine.  Per assisted living staff, they note pain seems to be a bit better controlled since nursing staff is administering medications.  Also noted that patient has a grab bar on her bed, which, according to policy of the facility must be evaluated by the in-house therapy team in order to continue the grab bar.  She reports she underwent the autonomic testing [place] [facility] last week, and has follow-up planned with neurology.    ALLERGIES: Penicillins; Aspirin; Atenolol; Atorvastatin; Bupropion; Clindamycin; Codeine; Ibuprofen; Lovastatin; and Cephalexin  Past Medical, Surgical, Family and Social History reviewed and updated in [facility].    Current Outpatient Prescriptions   Medication Sig Dispense Refill     acetaminophen (TYLENOL) 500 MG tablet Take 1 tablet (500 mg) by mouth 3 times daily 90 tablet 1     acetaminophen (TYLENOL) 500 MG tablet Take 1-2 tablets (500-1,000 mg) by mouth 3 times daily as needed for mild pain 60 tablet 3     bisacodyl (DULCOLAX) 10 MG Suppository Place 10 mg rectally daily as needed for constipation       carbidopa-levodopa (SINEMET)  MG per tablet Take 2 tablets by mouth 3 times daily 180 tablet 1     cholecalciferol 2000 units tablet Take 1 tablet by mouth daily 30 tablet 3      citalopram (CELEXA) 20 MG tablet Take 1 tablet (20 mg) by mouth daily 90 tablet 1     diclofenac (VOLTAREN) 1 % GEL topical gel Place onto the skin 4 times daily QID prn to back and neck       gabapentin (NEURONTIN) 100 MG capsule Take 2 capsules (200 mg) by mouth At Bedtime 90 capsule 1     levothyroxine (SYNTHROID/LEVOTHROID) 137 MCG tablet Take 1 tablet (137 mcg) by mouth daily 90 tablet 3     multivitamin, therapeutic with minerals (MULTI-VITAMIN) TABS tablet Take 1 tablet by mouth daily 100 tablet 3     Nutritional Supplements (NUTRITIONAL SUPPLEMENT PO) Take by mouth daily       omeprazole (PRILOSEC) 20 MG CR capsule Take 1 capsule (20 mg) by mouth 2 times daily 90 capsule 3     polyethylene glycol (MIRALAX) powder Take 17 g by mouth daily 510 g 1     polyethylene glycol 0.4%- propylene glycol 0.3% (SYSTANE ULTRA) 0.4-0.3 % SOLN ophthalmic solution Place 1 drop into both eyes 4 times daily as needed for dry eyes       ranitidine (ZANTAC) 150 MG capsule Take 1 capsule (150 mg) by mouth At Bedtime 180 capsule 3     senna (SENOKOT) 8.6 MG tablet Take 1 tablet by mouth daily as needed for constipation 120 tablet 1     triamcinolone (KENALOG) 0.1 % cream Apply topically 2 times daily as needed for irritation BID prn to feet       Zinc Oxide (DESITIN) 13 % CREA Externally apply topically 2 times daily       Medications reviewed:  Medications reconciled to facility chart and changes were made to reflect current medications as identified as above med list. Below are the changes that were made:   Medications stopped since last EPIC medication reconciliation:   There are no discontinued medications.    Medications started since last [facility] medication reconciliation:  No orders of the defined types were placed in this encounter.        REVIEW OF SYSTEMS:  4 point ROS including Respiratory, CV, GI and , other than that noted in the HPI,  is negative    Physical Exam:  /77  Pulse 78  Temp 98.5  F (36.9  C)  Resp  16  Wt 144 lb (65.3 kg)  SpO2 93%  BMI 23.24 kg/m2  GENERAL APPEARANCE:  Alert, thin, anxious, cooperative  RESP:  respiratory effort and palpation of chest normal, lungs clear to auscultation , no respiratory distress  CV:  Palpation and auscultation of heart done , regular rate and rhythm, no murmur, rub, or gallop, no edema  M/S:   Gait and station abnormal Walks with a walker longer distances, sometimes unsteady on her feet no shuffling gait  Digits and nails abnormal Extremely dystrophic 10 toenails that are long thick and jagged  SKIN:  Inspection of skin and subcutaneous tissue baseline  PSYCH:  oriented X 3, affect abnormal Anxious    Recent Labs: All labs reviewed, none recent.        Assessment/Plan:  Chronic bilateral low back pain with bilateral sciatica   she continues to have chronic back pain not well controlled , But has not tolerated narcotics in the past.  She is on scheduled Tylenol.  She has follow-up planned with neurology regarding her movement disorder.   discussion with patient, outpatient OT regarding pain control issues.  I suspect much of her pain is related to her anxiety.     Adjustment disorder with anxious mood  She is currently on Celexa 20 mg daily, and is interested in a change of antidepressant as she does not feel that this is helping her depression or her anxiety.  We are also at the maximum recommended daily dose.  We could consider a transition to sertraline, but I would like her to see the neurologist first before making any changes to her medication regimen.    Movement disorder  Autonomic testing completed, and has follow-up with neurology planned.  She continues to tolerate the Sinemet, and is actually doing quite well with her ambulation.  Future Appointments  Date Time Provider Department Center   11/6/2018 1:30 PM Chris Monterroso MD [facility]        Nail dystrophy  All 10 toenails extremely dystrophic, thick and very jagged, causing her to not want to wear  shoes.  After permission obtained, all 10 toenails trimmed to the best of my ability.  Will need close follow-up and recurrent trimming to get them shaped up.    Home care OT was present during visit today, from [facility].  Discussed need for each his home care to evaluate for bedrail use, and OT agrees to discontinue [facility] home care effective today.  [facility] can evaluate and treat for the bedrail use, but patient may also benefit from outpatient therapy for modalities including heat and ultrasound for pain control.  This may also actually benefit her anxiety as well.      Orders:  1. discontinue Home Care via [facility] ( done 10/23)  2. [facility] PT/OT to eval and treat for bed rail use, pain control and modalities.    Electronically signed by  HAILEE Contreras CNP

## 2018-10-23 ENCOUNTER — HOME CARE/HOSPICE - HEALTHEAST (OUTPATIENT)
Dept: HOME HEALTH SERVICES | Facility: HOME HEALTH | Age: 78
End: 2018-10-23

## 2018-10-23 ENCOUNTER — ASSISTED LIVING VISIT (OUTPATIENT)
Dept: GERIATRICS | Facility: CLINIC | Age: 78
End: 2018-10-23
Payer: COMMERCIAL

## 2018-10-23 VITALS
OXYGEN SATURATION: 93 % | BODY MASS INDEX: 23.24 KG/M2 | WEIGHT: 144 LBS | TEMPERATURE: 98.5 F | RESPIRATION RATE: 16 BRPM | SYSTOLIC BLOOD PRESSURE: 121 MMHG | DIASTOLIC BLOOD PRESSURE: 77 MMHG | HEART RATE: 78 BPM

## 2018-10-23 DIAGNOSIS — M54.41 CHRONIC BILATERAL LOW BACK PAIN WITH BILATERAL SCIATICA: Primary | ICD-10-CM

## 2018-10-23 DIAGNOSIS — L60.3 NAIL DYSTROPHY: ICD-10-CM

## 2018-10-23 DIAGNOSIS — G25.9 MOVEMENT DISORDER: ICD-10-CM

## 2018-10-23 DIAGNOSIS — M54.42 CHRONIC BILATERAL LOW BACK PAIN WITH BILATERAL SCIATICA: Primary | ICD-10-CM

## 2018-10-23 DIAGNOSIS — G89.29 CHRONIC BILATERAL LOW BACK PAIN WITH BILATERAL SCIATICA: Primary | ICD-10-CM

## 2018-10-23 DIAGNOSIS — F43.22 ADJUSTMENT DISORDER WITH ANXIOUS MOOD: ICD-10-CM

## 2018-10-26 RX ORDER — ACETAMINOPHEN 500 MG
500 TABLET ORAL 3 TIMES DAILY
Qty: 90 TABLET | Refills: 1
Start: 2018-10-26 | End: 2019-07-06

## 2018-11-02 ENCOUNTER — OFFICE VISIT - HEALTHEAST (OUTPATIENT)
Dept: CARDIOLOGY | Facility: CLINIC | Age: 78
End: 2018-11-02

## 2018-11-02 ENCOUNTER — TELEPHONE (OUTPATIENT)
Dept: NEUROLOGY | Facility: CLINIC | Age: 78
End: 2018-11-02

## 2018-11-02 DIAGNOSIS — I10 ESSENTIAL HYPERTENSION: ICD-10-CM

## 2018-11-02 ASSESSMENT — MIFFLIN-ST. JEOR: SCORE: 1151.94

## 2018-11-05 ENCOUNTER — TELEPHONE (OUTPATIENT)
Dept: NEUROLOGY | Facility: CLINIC | Age: 78
End: 2018-11-05

## 2018-11-05 ENCOUNTER — MEDICAL CORRESPONDENCE (OUTPATIENT)
Dept: NEUROLOGY | Facility: CLINIC | Age: 78
End: 2018-11-05

## 2018-11-05 NOTE — TELEPHONE ENCOUNTER
Writer received letter form Scot Mcneil MD;labeled and scanned to pt chart. Notified provider via epic mail that patient [facility] Autonomic test report in file folder in cabinet.

## 2018-11-05 NOTE — PROCEDURES
Disregard PREVIOUS NOTE:     Writer received letter form Scot Mcneil MD;labeled and scanned to pt chart. Notified provider via epic mail that patient [facility] Autonomic test report in file folder in cabinet.

## 2018-11-05 NOTE — TELEPHONE ENCOUNTER
Called patient to let her know we have received her autonomic testing results. Catherine placed them in Dr. Monterroso's folder for review. Dr. Monterroso seeing patient tomorrow 11/6.

## 2018-11-06 ENCOUNTER — OFFICE VISIT (OUTPATIENT)
Dept: NEUROLOGY | Facility: CLINIC | Age: 78
End: 2018-11-06
Payer: MEDICAID

## 2018-11-06 VITALS — OXYGEN SATURATION: 96 % | HEART RATE: 90 BPM | SYSTOLIC BLOOD PRESSURE: 127 MMHG | DIASTOLIC BLOOD PRESSURE: 82 MMHG

## 2018-11-06 DIAGNOSIS — G90.3 MULTIPLE SYSTEM ATROPHY (H): Primary | ICD-10-CM

## 2018-11-06 DIAGNOSIS — G23.8 MULTIPLE SYSTEM ATROPHY (H): Primary | ICD-10-CM

## 2018-11-06 ASSESSMENT — PAIN SCALES - GENERAL: PAINLEVEL: EXTREME PAIN (8)

## 2018-11-06 NOTE — LETTER
2018       RE: Mehreen Caamra  [facility]  [address]  [place] [facility] [address]     Dear Colleague,    Thank you for referring your patient, Mehreen Camara, to the [facility] at [facility]. Please see a copy of my visit note below.    [facility]: [place]    Movement Disorders Progress Note    Patient Name:  Mehreen Camara  MRN:  [id number]    :  1940  Date of Admission:  (Not on file)  Date of Service:  2018  Primary care provider:  Cathy Sargent    Summary: 78F hx of parkinsonism, severe orthostatic hypotension who is here for f/u. She has a working diagnosis of PD vs. MSA. She presented with severe orthostatic hypotension in 2017 and later developed parkinsonism. She has a hx of small handwriting, decreased smell, talking/screaming in sleep, constipation, FOG, mild incontinence. She has an MRI showing T2 changes in the london but no hot cross buns sign. She was sent to Dr. Maddox at last visit for autonomic testing, which showed severe central pathology c/w either MSA > PD. Since last visit, her primary concerning is some intermittent abdominal pain of 3 months duration in the left abdomen that seems to come on only with eating. She is in a wheelchair today as her standing is limited by her orthostasis.     Physical Examination:   Vitals:  B/P: 127/82, T: Data Unavailable, P: 90, R: Data Unavailable  General:  Sitting up  HEENT:  NC/AT, no icterus, op pink and moist, no ear or nose drainage.   Cardiac:  RRR, no m/r/g  Chest:  CTAB  Abdomen:  S/NT/ND  Extremities:  No LE swelling.    Skin:  No rash or lesion.      Neuro Exam:  Mental status: AOx3, speech fluent  Cranial nerves: EOMI with normal saccades, face symmetric, tongue midline  Motor: 5/5 diffusely, no rigidity, mild BL bradykinesia  Sensory: equal to LT  Coordination: FTN intact  Gait:  defer    ==========================================================    ASSESSMENT/PLAN: 78F hx of parkinsonism, severe orthostatic hypotension who is here for f/u.    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 We spent most of the visit discussing that we think this is more likely MSA rather than PD given the severity of her orthostasis in addition to her parkinsonism. Her hx of RBD-like behaviors is also suggestive of a synucleinopathy. PD with dysautonomia is possible, however that is less common and typically only mild in nature unlike the dysautonomia with MSA. We went over with the patient and son that we expect that this will slowly progress over time and worsen gradually. We also stressed the continued importance of BP measurements at her assisted living facility and increased fluid intake.  --7 cups of water per day and tomato juice daily  --elevate HOB 4 inches at night to keep SBP <160  --orthostatic BP check once daily   --RTC 6 months    ## abdominal pain: 3 months of history, worse with eating. Suspicious for intra-abdominal process. Recommended to her and her son that they address this with her PCP.    =========================================================    This patient was seen & discussed with my attending, Dr. Monterroso, who agrees with my assessment and plan.     Boaz Mahmood  PGY4 Neurology  [phone]    I, Chris Monterroso, personally interviewed, examined and evaluated this patient on 11/6/18.  I discussed the patient with Dr. Prashanth Mahmood and agree with the assessment, examiantion and plan of care documented by Dr. Mahmood.  I personally reviewed the vital signs, medications and labs/imaging.    We had tried to tell whether Mrs. Ballard had idiopathic Parkinson's disease with dysautonomia or multiple system atrophy.  We did obtain her MRI of the head and it shows some intra-pontine signal change on the T2 weighted scan.  " However does not have the classic \"hot cross bun\" sign.  There is no pontine atrophy and no cerebellar atrophy.  Her midbrain london ratio is 0.54.  This is normal.  The patient had autonomic testing at [facility] and Dr. Izaguirre has sent a report.  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  The patient's had onset of symptoms about a year ago.  We discussed this in full with the patient and her son.  They know this is a severe disease without any curative therapy.  The best we can do is to try to manage things with low-dose levodopa and blood pressure control.  Dr. Brown is indicated our first steps and blood pressure control above.  I can follow her at six-month intervals for her MSA specific complaints.      Again, thank you for allowing me to participate in the care of your patient.      Sincerely,    Chris Monterroso MD      "

## 2018-11-06 NOTE — NURSING NOTE
Chief Complaint   Patient presents with     RECHECK     [facility] RETURN MOVEMENT DISORDER       Felisha Mariano, EMT

## 2018-11-06 NOTE — PROGRESS NOTES
[facility]: [place]    Movement Disorders Progress Note    Patient Name:  Mehreen Camara  MRN:  [id number]    :  1940  Date of Admission:  (Not on file)  Date of Service:  2018  Primary care provider:  Cathy Sargent    Summary: 78F hx of parkinsonism, severe orthostatic hypotension who is here for f/u. She has a working diagnosis of PD vs. MSA. She presented with severe orthostatic hypotension in 2017 and later developed parkinsonism. She has a hx of small handwriting, decreased smell, talking/screaming in sleep, constipation, FOG, mild incontinence. She has an MRI showing T2 changes in the london but no hot cross buns sign. She was sent to Dr. Maddox at last visit for autonomic testing, which showed severe central pathology c/w either MSA > PD. Since last visit, her primary concerning is some intermittent abdominal pain of 3 months duration in the left abdomen that seems to come on only with eating. She is in a wheelchair today as her standing is limited by her orthostasis.     Physical Examination:   Vitals:  B/P: 127/82, T: Data Unavailable, P: 90, R: Data Unavailable  General:  Sitting up  HEENT:  NC/AT, no icterus, op pink and moist, no ear or nose drainage.   Cardiac:  RRR, no m/r/g  Chest:  CTAB  Abdomen:  S/NT/ND  Extremities:  No LE swelling.    Skin:  No rash or lesion.      Neuro Exam:  Mental status: AOx3, speech fluent  Cranial nerves: EOMI with normal saccades, face symmetric, tongue midline  Motor: 5/5 diffusely, no rigidity, mild BL bradykinesia  Sensory: equal to LT  Coordination: FTN intact  Gait: defer    ==========================================================    ASSESSMENT/PLAN: 78F hx of parkinsonism, severe orthostatic hypotension who is here for f/u.    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 We spent most of the visit discussing that we think this is more likely  "MSA rather than PD given the severity of her orthostasis in addition to her parkinsonism. Her hx of RBD-like behaviors is also suggestive of a synucleinopathy. PD with dysautonomia is possible, however that is less common and typically only mild in nature unlike the dysautonomia with MSA. We went over with the patient and son that we expect that this will slowly progress over time and worsen gradually. We also stressed the continued importance of BP measurements at her assisted living facility and increased fluid intake.  --7 cups of water per day and tomato juice daily  --elevate HOB 4 inches at night to keep SBP <160  --orthostatic BP check once daily   --RTC 6 months    ## abdominal pain: 3 months of history, worse with eating. Suspicious for intra-abdominal process. Recommended to her and her son that they address this with her PCP.    =========================================================    This patient was seen & discussed with my attending, Dr. Monterroso, who agrees with my assessment and plan.     Boaz Mahmood  PGY4 Neurology  [phone]    I, Chris Monterroso, personally interviewed, examined and evaluated this patient on 11/6/18.  I discussed the patient with Dr. Prashanth Mahmood and agree with the assessment, examiantion and plan of care documented by Dr. Mahmood.  I personally reviewed the vital signs, medications and labs/imaging.    We had tried to tell whether Mrs. Ballard had idiopathic Parkinson's disease with dysautonomia or multiple system atrophy.  We did obtain her MRI of the head and it shows some intra-pontine signal change on the T2 weighted scan.  However does not have the classic \"hot cross bun\" sign.  There is no pontine atrophy and no cerebellar atrophy.  Her midbrain london ratio is 0.54.  This is normal.  The patient had autonomic testing at [facility] and Dr. Izaguirre has sent a report.  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  The patient's had onset of symptoms about a year ago.  We discussed this in full with the patient and her son.  They know this is a severe disease without any curative therapy.  The best we can do is to try to manage things with low-dose levodopa and blood pressure control.  Dr. Brown is indicated our first steps and blood pressure control above.  I can follow her at six-month intervals for her MSA specific complaints.    Chris Monterroso MD

## 2018-11-06 NOTE — MR AVS SNAPSHOT
After Visit Summary   11/6/2018    Mehreen Camara    MRN: [id number]           Patient Information     Date Of Birth          1940        Visit Information        Provider Department      11/6/2018 1:30 PM Chris Monterroso MD [facility] Neurology        Care Instructions    We believe you have a disease called Multiple System Atrophy    This will cause your balance to be poor and your blood pressure control to be difficult to control.    Elevate the head of your bed 4 inches  To keep lying BP < 160/100  Drink fluid 7 cups per day and take a cup of tomato juice to keep BP standing >100 systolic  Check BP daily    Return to Dr. Braga    We will see you once every 6 months to help with following this disease              Follow-ups after your visit        Follow-up notes from your care team     Return in about 6 months (around 5/6/2019).      Your next 10 appointments already scheduled     May 07, 2019  2:30 PM CDT   (Arrive by 2:15 PM)   Return Movement Disorder with Chris Monterroso MD   [facility] Neurology ([facility] [place])    [address] [place] [id number] [phone]              Who to contact     Please call your clinic at [phone] to:    Ask questions about your health    Make or cancel appointments    Discuss your medicines    Learn about your test results    Speak to your doctor            Additional Information About Your Visit        [employer] Information     [employer] gives you secure access to your electronic health record. If you see a primary care provider, you can also send messages to your care team and make appointments. If you have questions, please call your primary care clinic.  If you do not have a primary care provider, please call [phone] and they will assist you.      [employer] is an electronic gateway that provides easy, online access to your medical records. With [employer], you can request a clinic appointment,  read your test results, renew a prescription or communicate with your care team.     To access your existing account, please contact your [facility] or call [phone] for assistance.        Care EveryWhere ID     This is your Care EveryWhere ID. This could be used by other organizations to access your [place] medical records  [id number]        Your Vitals Were     Pulse Pulse Oximetry                90 96%           Blood Pressure from Last 3 Encounters:   11/06/18 127/82   10/23/18 121/77   10/12/18 121/77    Weight from Last 3 Encounters:   10/23/18 65.3 kg (144 lb)   10/12/18 66.8 kg (147 lb 3.2 oz)   09/19/18 68.2 kg (150 lb 6.4 oz)              Today, you had the following     No orders found for display         Today's Medication Changes          These changes are accurate as of 11/6/18  2:54 PM.  If you have any questions, ask your nurse or doctor.               These medicines have changed or have updated prescriptions.        Dose/Directions    gabapentin 100 MG capsule   Commonly known as:  NEURONTIN   This may have changed:  when to take this   Used for:  Chronic bilateral low back pain with bilateral sciatica        Dose:  200 mg   Take 2 capsules (200 mg) by mouth At Bedtime   Quantity:  90 capsule   Refills:  1                Primary Care Provider Office Phone # Fax #    Cathy HAILEE Sargent [phone]       [address] [place] [id number]        Equal Access to Services     [facility] : Hadii dora boydo Soplacido, waaxda luqadaha, qaybta kaalmada sampsonyada, miriam durham . So [facility] [phone].    ATENCIÓN: Si habla español, tiene a camacho disposición servicios gratuitos de asistencia lingüística. Llame al [phone].    We comply with applicable federal civil rights laws and [place] laws. We do not discriminate on the basis of race, color, national origin, age, disability, sex, sexual orientation, or gender  identity.            Thank you!     Thank you for choosing [facility] NEUROLOGY  for your care. Our goal is always to provide you with excellent care. Hearing back from our patients is one way we can continue to improve our services. Please take a few minutes to complete the written survey that you may receive in the mail after your visit with us. Thank you!             Your Updated Medication List - Protect others around you: Learn how to safely use, store and throw away your medicines at www.disposemymeds.org.          This list is accurate as of 11/6/18  2:54 PM.  Always use your most recent med list.                   Brand Name Dispense Instructions for use Diagnosis    * acetaminophen 500 MG tablet    TYLENOL    60 tablet    Take 1-2 tablets (500-1,000 mg) by mouth 3 times daily as needed for mild pain    Primary osteoarthritis involving multiple joints       * acetaminophen 500 MG tablet    TYLENOL    90 tablet    Take 1 tablet (500 mg) by mouth 3 times daily    Chronic bilateral low back pain with bilateral sciatica       bisacodyl 10 MG Suppository    DULCOLAX     Place 10 mg rectally daily as needed for constipation        carbidopa-levodopa  MG per tablet    SINEMET    180 tablet    Take 2 tablets by mouth 3 times daily    Movement disorder       cholecalciferol 2000 units tablet     30 tablet    Take 1 tablet by mouth daily    Age-related osteoporosis without current pathological fracture       citalopram 20 MG tablet    celeXA    90 tablet    Take 1 tablet (20 mg) by mouth daily    Episode of recurrent major depressive disorder, unspecified depression episode severity (H)       DESITIN 13 % Crea   Generic drug:  Zinc Oxide      Externally apply topically 2 times daily        diclofenac 1 % Gel topical gel    VOLTAREN     Place onto the skin 4 times daily QID prn to back and neck    Primary osteoarthritis involving multiple joints       gabapentin 100 MG capsule    NEURONTIN    90 capsule    Take 2  capsules (200 mg) by mouth At Bedtime    Chronic bilateral low back pain with bilateral sciatica       levothyroxine 137 MCG tablet    SYNTHROID/LEVOTHROID    90 tablet    Take 1 tablet (137 mcg) by mouth daily    Acquired hypothyroidism       multivitamin, therapeutic with minerals Tabs tablet     100 tablet    Take 1 tablet by mouth daily    Takes dietary supplements       NUTRITIONAL SUPPLEMENT PO      Take by mouth daily        omeprazole 20 MG CR capsule    priLOSEC    90 capsule    Take 1 capsule (20 mg) by mouth 2 times daily    Harris's esophagus with dysplasia       polyethylene glycol 0.4%- propylene glycol 0.3% 0.4-0.3 % Soln ophthalmic solution    SYSTANE ULTRA     Place 1 drop into both eyes 4 times daily as needed for dry eyes    Dry eyes       polyethylene glycol powder    MIRALAX    510 g    Take 17 g by mouth daily    Chronic idiopathic constipation       ranitidine 150 MG capsule    ZANTAC    180 capsule    Take 1 capsule (150 mg) by mouth At Bedtime    Harris's esophagus with dysplasia       senna 8.6 MG tablet    SENOKOT    120 tablet    Take 1 tablet by mouth daily as needed for constipation    Chronic idiopathic constipation       triamcinolone 0.1 % cream    KENALOG     Apply topically 2 times daily as needed for irritation BID prn to feet    Dermatitis       * Notice:  This list has 2 medication(s) that are the same as other medications prescribed for you. Read the directions carefully, and ask your doctor or other care provider to review them with you.

## 2018-11-08 ENCOUNTER — TRANSFERRED RECORDS (OUTPATIENT)
Dept: HEALTH INFORMATION MANAGEMENT | Facility: CLINIC | Age: 78
End: 2018-11-08

## 2018-11-08 ENCOUNTER — RECORDS - HEALTHEAST (OUTPATIENT)
Dept: LAB | Facility: CLINIC | Age: 78
End: 2018-11-08

## 2018-11-08 LAB
ALBUMIN SERPL-MCNC: 3.7 G/DL (ref 3.5–5)
ALBUMIN SERPL-MCNC: 3.7 G/DL (ref 3.5–5)
ALP SERPL-CCNC: 63 U/L (ref 45–120)
ALP SERPL-CCNC: 63 U/L (ref 45–120)
ALT SERPL W P-5'-P-CCNC: <9 U/L (ref 0–45)
ALT SERPL-CCNC: <9 U/L (ref 0–45)
ANION GAP SERPL CALCULATED.3IONS-SCNC: 8 MMOL/L (ref 5–18)
ANION GAP SERPL CALCULATED.3IONS-SCNC: 8 MMOL/L (ref 5–18)
AST SERPL W P-5'-P-CCNC: 17 U/L (ref 0–40)
AST SERPL-CCNC: 17 U/L (ref 0–40)
BASOPHILS # BLD AUTO: 0.1 THOU/UL (ref 0–0.2)
BASOPHILS NFR BLD AUTO: 1 % (ref 0–2)
BILIRUB SERPL-MCNC: 0.5 MG/DL (ref 0–1)
BILIRUB SERPL-MCNC: 0.5 MG/DL (ref 0–1)
BUN SERPL-MCNC: 15 MG/DL (ref 6–26)
BUN SERPL-MCNC: 15 MG/DL (ref 8–28)
CALCIUM SERPL-MCNC: 9.4 MG/DL (ref 8.5–10.5)
CALCIUM SERPL-MCNC: 9.4 MG/DL (ref 8.5–10.5)
CHLORIDE BLD-SCNC: 99 MMOL/L (ref 98–107)
CHLORIDE SERPLBLD-SCNC: 99 MMOL/L (ref 98–107)
CO2 SERPL-SCNC: 32 MMOL/L (ref 22–31)
CO2 SERPL-SCNC: 32 MMOL/L (ref 22–31)
CREAT SERPL-MCNC: 0.69 MG/DL (ref 0.6–1.1)
CREAT SERPL-MCNC: 0.69 MG/DL (ref 0.6–1.1)
DIFFERENTIAL: ABNORMAL
EOSINOPHIL # BLD AUTO: 0 THOU/UL (ref 0–0.4)
EOSINOPHIL NFR BLD AUTO: 0 % (ref 0–6)
ERYTHROCYTE [DISTWIDTH] IN BLOOD BY AUTOMATED COUNT: 11.9 % (ref 11–14.5)
ERYTHROCYTE [DISTWIDTH] IN BLOOD BY AUTOMATED COUNT: 11.9 % (ref 11–14.5)
GFR SERPL CREATININE-BSD FRML MDRD: >60 ML/MIN/1.73M2
GFR SERPL CREATININE-BSD FRML MDRD: >60 ML/MIN/1.73M2
GLUCOSE BLD-MCNC: 97 MG/DL (ref 70–125)
GLUCOSE SERPL-MCNC: 97 MG/DL (ref 70–125)
HCT VFR BLD AUTO: 43.2 % (ref 35–47)
HCT VFR BLD AUTO: 43.2 % (ref 35–47)
HEMOGLOBIN: 13.7 G/DL (ref 12–16)
HGB BLD-MCNC: 13.7 G/DL (ref 12–16)
LYMPHOCYTES # BLD AUTO: 1.4 THOU/UL (ref 0.8–4.4)
LYMPHOCYTES NFR BLD AUTO: 25 % (ref 20–40)
MCH RBC QN AUTO: 30.6 PG (ref 27–34)
MCH RBC QN AUTO: 30.6 PG (ref 27–34)
MCHC RBC AUTO-ENTMCNC: 31.7 G/DL (ref 32–36)
MCHC RBC AUTO-ENTMCNC: 31.7 G/DL (ref 32–36)
MCV RBC AUTO: 96 FL (ref 80–100)
MCV RBC AUTO: 96 FL (ref 80–100)
MONOCYTES # BLD AUTO: 0.5 THOU/UL (ref 0–0.9)
MONOCYTES NFR BLD AUTO: 8 % (ref 2–10)
NEUTROPHILS # BLD AUTO: 3.8 THOU/UL (ref 2–7.7)
NEUTROPHILS NFR BLD AUTO: 67 % (ref 50–70)
PLATELET # BLD AUTO: 217 THOU/UL (ref 140–440)
PLATELET # BLD AUTO: 217 THOU/UL (ref 140–440)
PMV BLD AUTO: 10.5 FL (ref 8.5–12.5)
POTASSIUM BLD-SCNC: 4.1 MMOL/L (ref 3.5–5)
POTASSIUM SERPL-SCNC: 4.1 MMOL/L (ref 3.5–5)
PROT SERPL-MCNC: 7.2 G/DL (ref 6–8)
PROT SERPL-MCNC: 7.2 G/DL (ref 6–8)
RBC # BLD AUTO: 4.48 MILL/UL (ref 3.8–5.4)
RBC # BLD AUTO: 4.48 MILL/UL (ref 3.8–5.4)
SODIUM SERPL-SCNC: 139 MMOL/L (ref 136–145)
SODIUM SERPL-SCNC: 139 MMOL/L (ref 136–145)
WBC # BLD AUTO: 5.8 THOU/UL (ref 4–11)
WBC: 5.8 THOU/UL (ref 4–11)

## 2018-11-12 NOTE — PROGRESS NOTES
[facility]    Chief Complaint   Patient presents with     ROGER       [facility] Medical Record Number:  [id number]  [facility][id number]    HPI:    Mehreen Camara is a 78 year old  (1940), who is being seen today for an episodic care visit.  HPI information obtained from: facility chart records, facility staff, patient report and [facility] chart review.Today's concern is:     Abdominal pain, generalized  Nausea  Flatulence, eructation, and gas pain  Primary osteoarthritis involving multiple joints  Chronic idiopathic constipation  Hypothyroidism, unspecified type  Essential hypertension  Multiple system atrophy (H)     Mehreen has had abdominal pain with nausea, weight loss of about 9 pounds in last 3 months, and significant gas as well as constipation for about 3 months, since moving to assisted living facility.  She is very anxious about this as well as her new diagnosis of MSA (per neurology).  She is seen today with her son (Nicholsa) in attendance.  Family is concerned that she is having gallbladder trouble.  She also has diagnosis of IBS in the past, per gastroenterology and had colonoscopy in 8/2018 but these results are currently unavailable to me in Care Everywhere.     ALLERGIES: Penicillins; Aspirin; Atenolol; Atorvastatin; Bupropion; Clindamycin; Codeine; Ibuprofen; Lovastatin; and Cephalexin  Past Medical, Surgical, Family and Social History reviewed and updated in [facility].    Current Outpatient Prescriptions   Medication Sig Dispense Refill     acetaminophen (TYLENOL) 500 MG tablet Take 1 tablet (500 mg) by mouth 3 times daily 90 tablet 1     acetaminophen (TYLENOL) 500 MG tablet Take 1-2 tablets (500-1,000 mg) by mouth 3 times daily as needed for mild pain 60 tablet 3     bisacodyl (DULCOLAX) 10 MG Suppository Place 10 mg rectally daily as needed for constipation       calcium carbonate (TUMS) 500 MG chewable tablet Take 1  tablet (500 mg) by mouth every 2 hours as needed for heartburn 150 tablet 1     carbidopa-levodopa (SINEMET)  MG per tablet Take 2 tablets by mouth 3 times daily 180 tablet 1     cholecalciferol 2000 units tablet Take 1 tablet by mouth daily 30 tablet 3     citalopram (CELEXA) 20 MG tablet Take 1 tablet (20 mg) by mouth daily 90 tablet 1     diclofenac (VOLTAREN) 1 % GEL topical gel Place onto the skin 4 times daily QID prn to back and neck       gabapentin (NEURONTIN) 100 MG capsule Take 2 capsules (200 mg) by mouth At Bedtime (Patient taking differently: Take 200 mg by mouth 2 times daily ) 90 capsule 1     levothyroxine (SYNTHROID/LEVOTHROID) 137 MCG tablet Take 1 tablet (137 mcg) by mouth daily 90 tablet 3     multivitamin, therapeutic with minerals (MULTI-VITAMIN) TABS tablet Take 1 tablet by mouth daily 100 tablet 3     Nutritional Supplements (NUTRITIONAL SUPPLEMENT PO) Take by mouth daily       omeprazole (PRILOSEC) 20 MG CR capsule Take 1 capsule (20 mg) by mouth 2 times daily 90 capsule 3     polyethylene glycol (MIRALAX) powder Take 17 g by mouth daily 510 g 1     polyethylene glycol 0.4%- propylene glycol 0.3% (SYSTANE ULTRA) 0.4-0.3 % SOLN ophthalmic solution Place 1 drop into both eyes 4 times daily as needed for dry eyes       ranitidine (ZANTAC) 150 MG capsule Take 1 capsule (150 mg) by mouth At Bedtime 180 capsule 3     senna (SENOKOT) 8.6 MG tablet Take 1 tablet by mouth daily as needed for constipation 120 tablet 1     triamcinolone (KENALOG) 0.1 % cream Apply topically 2 times daily as needed for irritation BID prn to feet       Zinc Oxide (DESITIN) 13 % CREA Externally apply topically 2 times daily       Medications reviewed:  Medications reconciled to facility chart and changes were made to reflect current medications as identified as above med list. Below are the changes that were made:   Medications stopped since last EPIC medication reconciliation:   There are no discontinued  "medications.    Medications started since last [facility] medication reconciliation:  No orders of the defined types were placed in this encounter.        REVIEW OF SYSTEMS:  Mehreen reports chronic abdominal pain, flatulence, belching, which is distressing to her. She reports her bowels move small/firm amounts several times per day but she feels as if she never \"has a good bowel movement\" . She reports being so worried about all of her medical concerns, new diagnosis of MSA, and follow up needs with that.     Physical Exam:  /87  Pulse 72  Temp 97.2  F (36.2  C)  Resp 18  Wt 141 lb (64 kg)  SpO2 95%  BMI 22.76 kg/m2  GENERAL APPEARANCE:  Alert, thin, anxious  RESP:  respiratory effort and palpation of chest normal, lungs clear to auscultation , no respiratory distress  CV:  Palpation and auscultation of heart done , no edema, rate-normal, grade 3/6 murmur  ABDOMEN:  bowel sounds RUQ, RLQ, LUQ, LLQ and hyperactive throughout, tender entire abdomen moderate and guarding no rebound tenderness, no masses, no CVA tenderness  M/S:   Gait and station abnormal small steps, walker for longer distance  PSYCH:  oriented X 3, anxious    Recent Labs:     CBC RESULTS:   Recent Labs   Lab Test 11/08/18   WBC  5.8   RBC  4.48   HGB  13.7   HCT  43.2   MCV  96   MCH  30.6   MCHC  31.7*   RDW  11.9   PLT  217       Last Basic Metabolic Panel:  Recent Labs   Lab Test 11/08/18   NA  139   POTASSIUM  4.1   CHLORIDE  99   YADIRA  9.4   CO2  32*   BUN  15   CR  0.69   GLC  97       Liver Function Studies -   Recent Labs   Lab Test 11/08/18   PROTTOTAL  7.2   ALBUMIN  3.7   BILITOTAL  0.5   ALKPHOS  63   AST  17   ALT  <9               Assessment/Plan:  Abdominal pain, generalized  Nausea  Flatulence, eructation, and gas pain  Constipation   Patient with 3+ month history of abdominal pain, constipation, weight loss, nausea, flatulence. Did have colonoscopy in 8/2018 per review of Care Everywhere, but I am unable to view these results. " Abdominal xray done about 1 month ago as copied above. Recent abdominal US done several days ago as copied above.  Labs as noted above .  Concerning for cholecystitis. Of note, she does have history of IBS in her medical records, has been seen by Gastroenterology in the past  - NM Hepatobiliary Scan w GB EF; Future-information given to family to schedule this appointment at earliest convenience.    - calcium carbonate (TUMS) 500 MG chewable tablet; Take 1 tablet (500 mg) by mouth every 2 hours as needed for heartburn  -simethicone 125 mg QID may keep at bedside  -await results of HIDA scan.  If + will need to see surgeon, if - will need to see gastroenterology    Primary osteoarthritis involving multiple joints  No reports of pain today. Ambulating without pain     Essential hypertension  Stable on current regimen. She still takes her BP BID and this trends usually <120 systolic    Multiple system atrophy (H)  New diagnosis, per review of records.  Appreciate input from neurology.  Goal with this disease to maintain BP, maintain activity, continue low dose sinemet. Follow up routinely  Future Appointments  Date Time Provider Department Center   5/7/2019 2:30 PM Chris Monterroso MD [facility]        Adjustment disorder with mixed anxiety and depressed mood  She is on celexa, she reports she used to be on zoloft. She is very anxious, perseverative, fixated on her abdominal discomfort. I suspect that a large component of this abdominal pain is worsened by her fixation on her bowels, her use of bowel medications, and her ongoing and apparently chronic anxiety.  Per review of Care Everywhere, has been anxious for months about her bowels.    -consider change of antidepressant if GB workup is unrevealing.       Orders:  1. Miralax 8.5 gm at 12:30 p.m Dx constipation, in addition to 17 gm in am  2. Resume senna s 2 tabs po qam and 1 tab po at bedtime per current order  3. Update Cathy with bowel status in 2-3 days  4.  TUMS 1 tab po q 2 hr prn and may leave at bedside.  5. Gas X 125 mg po QID after meals - may keep at bedside and self admin    Electronically signed by  HAILEE Contreras CNP

## 2018-11-13 ENCOUNTER — ASSISTED LIVING VISIT (OUTPATIENT)
Dept: GERIATRICS | Facility: CLINIC | Age: 78
End: 2018-11-13
Payer: COMMERCIAL

## 2018-11-13 VITALS
DIASTOLIC BLOOD PRESSURE: 87 MMHG | BODY MASS INDEX: 22.76 KG/M2 | OXYGEN SATURATION: 95 % | HEART RATE: 72 BPM | WEIGHT: 141 LBS | TEMPERATURE: 97.2 F | SYSTOLIC BLOOD PRESSURE: 149 MMHG | RESPIRATION RATE: 18 BRPM

## 2018-11-13 DIAGNOSIS — M15.0 PRIMARY OSTEOARTHRITIS INVOLVING MULTIPLE JOINTS: ICD-10-CM

## 2018-11-13 DIAGNOSIS — R14.3 FLATULENCE, ERUCTATION, AND GAS PAIN: ICD-10-CM

## 2018-11-13 DIAGNOSIS — R14.1 FLATULENCE, ERUCTATION, AND GAS PAIN: ICD-10-CM

## 2018-11-13 DIAGNOSIS — K59.04 CHRONIC IDIOPATHIC CONSTIPATION: ICD-10-CM

## 2018-11-13 DIAGNOSIS — G23.8 MULTIPLE SYSTEM ATROPHY (H): ICD-10-CM

## 2018-11-13 DIAGNOSIS — I10 ESSENTIAL HYPERTENSION: ICD-10-CM

## 2018-11-13 DIAGNOSIS — R14.2 FLATULENCE, ERUCTATION, AND GAS PAIN: ICD-10-CM

## 2018-11-13 DIAGNOSIS — R11.0 NAUSEA: ICD-10-CM

## 2018-11-13 DIAGNOSIS — G90.3 MULTIPLE SYSTEM ATROPHY (H): ICD-10-CM

## 2018-11-13 DIAGNOSIS — R10.84 ABDOMINAL PAIN, GENERALIZED: Primary | ICD-10-CM

## 2018-11-13 DIAGNOSIS — F43.23 ADJUSTMENT DISORDER WITH MIXED ANXIETY AND DEPRESSED MOOD: ICD-10-CM

## 2018-11-14 ENCOUNTER — MEDICAL CORRESPONDENCE (OUTPATIENT)
Dept: NEUROLOGY | Facility: CLINIC | Age: 78
End: 2018-11-14

## 2018-11-14 ENCOUNTER — TELEPHONE (OUTPATIENT)
Dept: NEUROLOGY | Facility: CLINIC | Age: 78
End: 2018-11-14

## 2018-11-14 PROBLEM — G23.8 MULTIPLE SYSTEM ATROPHY (H): Status: ACTIVE | Noted: 2018-11-14

## 2018-11-14 PROBLEM — G90.3 MULTIPLE SYSTEM ATROPHY (H): Status: ACTIVE | Noted: 2018-11-14

## 2018-11-14 RX ORDER — CALCIUM CARBONATE 500 MG/1
1 TABLET, CHEWABLE ORAL
Qty: 150 TABLET | Refills: 1
Start: 2018-11-14 | End: 2020-10-14

## 2018-11-20 ENCOUNTER — ASSISTED LIVING VISIT (OUTPATIENT)
Dept: GERIATRICS | Facility: CLINIC | Age: 78
End: 2018-11-20
Payer: COMMERCIAL

## 2018-11-20 ENCOUNTER — HOSPITAL ENCOUNTER (OUTPATIENT)
Dept: NUCLEAR MEDICINE | Facility: CLINIC | Age: 78
Setting detail: NUCLEAR MEDICINE
Discharge: HOME | End: 2018-11-20
Attending: NURSE PRACTITIONER | Admitting: NURSE PRACTITIONER
Payer: MEDICARE

## 2018-11-20 VITALS
BODY MASS INDEX: 22.76 KG/M2 | TEMPERATURE: 97.2 F | SYSTOLIC BLOOD PRESSURE: 149 MMHG | HEART RATE: 72 BPM | WEIGHT: 141 LBS | RESPIRATION RATE: 18 BRPM | DIASTOLIC BLOOD PRESSURE: 87 MMHG

## 2018-11-20 DIAGNOSIS — R14.1 FLATULENCE, ERUCTATION, AND GAS PAIN: ICD-10-CM

## 2018-11-20 DIAGNOSIS — R14.3 FLATULENCE, ERUCTATION, AND GAS PAIN: ICD-10-CM

## 2018-11-20 DIAGNOSIS — R14.2 FLATULENCE, ERUCTATION, AND GAS PAIN: ICD-10-CM

## 2018-11-20 DIAGNOSIS — R11.0 NAUSEA: ICD-10-CM

## 2018-11-20 DIAGNOSIS — L30.9 DERMATITIS: Primary | ICD-10-CM

## 2018-11-20 DIAGNOSIS — R10.84 ABDOMINAL PAIN, GENERALIZED: ICD-10-CM

## 2018-11-20 PROCEDURE — A9537 TC99M MEBROFENIN: HCPCS | Performed by: NURSE PRACTITIONER

## 2018-11-20 PROCEDURE — 25000128 H RX IP 250 OP 636: Performed by: NURSE PRACTITIONER

## 2018-11-20 PROCEDURE — 78227 HEPATOBIL SYST IMAGE W/DRUG: CPT

## 2018-11-20 PROCEDURE — 34300033 ZZH RX 343: Performed by: NURSE PRACTITIONER

## 2018-11-20 RX ORDER — KIT FOR THE PREPARATION OF TECHNETIUM TC 99M MEBROFENIN 45 MG/10ML
5 INJECTION, POWDER, LYOPHILIZED, FOR SOLUTION INTRAVENOUS ONCE
Status: COMPLETED | OUTPATIENT
Start: 2018-11-20 | End: 2018-11-20

## 2018-11-20 RX ADMIN — MEBROFENIN 7.2 MILLICURIE: 45 INJECTION, POWDER, LYOPHILIZED, FOR SOLUTION INTRAVENOUS at 08:30

## 2018-11-20 RX ADMIN — SODIUM CHLORIDE 1.3 MCG: 9 INJECTION, SOLUTION INTRAMUSCULAR; INTRAVENOUS; SUBCUTANEOUS at 09:33

## 2018-11-20 NOTE — PROGRESS NOTES
[facility]    Chief Complaint   Patient presents with     JENSENECK       [facility] Record Number:  [id number]  Place of Service where encounter took place:  [facility] ([facility] [[id number]]    HPI:    Mehreen Camara is a 78 year old  (1940), who is being seen today for an episodic care visit.  HPI information obtained from: facility chart records, facility staff, patient report and [facility] chart review.Today's concern is:     Dermatitis  Nausea  Abdominal pain, generalized  Flatulence, eructation, and gas pain     Patient notes some round, red, slightly raised, dry and itchy patches on abdomen, arms.  She reports that these have come/gone.  She also notes ongoing nausea, vomiting and abdominal pain and completed hepatobiliary scan this am, pending results.     ALLERGIES: Penicillins; Aspirin; Atenolol; Atorvastatin; Bupropion; Clindamycin; Codeine; Ibuprofen; Lovastatin; and Cephalexin  Past Medical, Surgical, Family and Social History reviewed and updated in [facility].    Current Outpatient Prescriptions   Medication Sig Dispense Refill     miconazole (MICONAZOLE ANTIFUNGAL) 2 % cream Apply topically 2 times daily for 15 days       acetaminophen (TYLENOL) 500 MG tablet Take 1 tablet (500 mg) by mouth 3 times daily 90 tablet 1     acetaminophen (TYLENOL) 500 MG tablet Take 1-2 tablets (500-1,000 mg) by mouth 3 times daily as needed for mild pain 60 tablet 3     bisacodyl (DULCOLAX) 10 MG Suppository Place 10 mg rectally daily as needed for constipation       calcium carbonate (TUMS) 500 MG chewable tablet Take 1 tablet (500 mg) by mouth every 2 hours as needed for heartburn 150 tablet 1     carbidopa-levodopa (SINEMET)  MG per tablet Take 2 tablets by mouth 3 times daily 180 tablet 1     cholecalciferol 2000 units tablet Take 1 tablet by mouth daily 30 tablet 3     citalopram (CELEXA) 20 MG tablet Take 1 tablet (20 mg) by mouth daily 90 tablet 1     diclofenac (VOLTAREN) 1 % GEL  topical gel Place onto the skin 4 times daily QID prn to back and neck       gabapentin (NEURONTIN) 100 MG capsule Take 2 capsules (200 mg) by mouth At Bedtime (Patient taking differently: Take 200 mg by mouth 2 times daily ) 90 capsule 1     levothyroxine (SYNTHROID/LEVOTHROID) 137 MCG tablet Take 1 tablet (137 mcg) by mouth daily 90 tablet 3     multivitamin, therapeutic with minerals (MULTI-VITAMIN) TABS tablet Take 1 tablet by mouth daily 100 tablet 3     Nutritional Supplements (NUTRITIONAL SUPPLEMENT PO) Take by mouth daily       omeprazole (PRILOSEC) 20 MG CR capsule Take 1 capsule (20 mg) by mouth 2 times daily 90 capsule 3     polyethylene glycol (MIRALAX) powder Take 17 g by mouth daily 510 g 1     polyethylene glycol 0.4%- propylene glycol 0.3% (SYSTANE ULTRA) 0.4-0.3 % SOLN ophthalmic solution Place 1 drop into both eyes 4 times daily as needed for dry eyes       ranitidine (ZANTAC) 150 MG capsule Take 1 capsule (150 mg) by mouth At Bedtime 180 capsule 3     senna (SENOKOT) 8.6 MG tablet Take 1 tablet by mouth daily as needed for constipation 120 tablet 1     triamcinolone (KENALOG) 0.1 % cream Apply topically 2 times daily as needed for irritation BID prn to feet       Zinc Oxide (DESITIN) 13 % CREA Externally apply topically 2 times daily       Medications reviewed:  Medications reconciled to facility chart and changes were made to reflect current medications as identified as above med list. Below are the changes that were made:   Medications stopped since last EPIC medication reconciliation:   There are no discontinued medications.    Medications started since last [facility] medication reconciliation:  No orders of the defined types were placed in this encounter.        REVIEW OF SYSTEMS:  4 point ROS including Respiratory, CV, GI and , other than that noted in the HPI,  is negative    Physical Exam:  /87  Pulse 72  Temp 97.2  F (36.2  C)  Resp 18  Wt 141 lb (64 kg)  BMI 22.76 kg/m2  GENERAL  APPEARANCE:  Alert, anxious  RESP:  no respiratory distress  ABDOMEN:  small round patchy areas of reddened and raised skin with dry patches    Recent Labs:     CBC RESULTS:   Recent Labs   Lab Test 11/08/18   WBC  5.8   RBC  4.48   HGB  13.7   HCT  43.2   MCV  96   MCH  30.6   MCHC  31.7*   RDW  11.9   PLT  217       Last Basic Metabolic Panel:  Recent Labs   Lab Test 11/08/18   NA  139   POTASSIUM  4.1   CHLORIDE  99   YADIRA  9.4   CO2  32*   BUN  15   CR  0.69   GLC  97       Liver Function Studies -   Recent Labs   Lab Test 11/08/18   PROTTOTAL  7.2   ALBUMIN  3.7   BILITOTAL  0.5   ALKPHOS  63   AST  17   ALT  <9     Assessment/Plan:  Dermatitis  Suspect fungal dermal infection like tinea.    - miconazole (MICONAZOLE ANTIFUNGAL) 2 % cream; Apply topically 2 times daily for 15 days    Nausea  Abdominal pain, generalized  Flatulence, eructation, and gas pain  Continues, and did have hepatobiliary scan this am to determine GB EF.  Awaiting final results, which indicate decreased EF-38%.  Made recommendation for surgical consult to determine next steps.       Orders:  1. Miconazole topical 2% cream - apply BID to all lesions x 14 days.    Electronically signed by  HAILEE Contreras CNP

## 2018-11-21 DIAGNOSIS — K81.1 CHRONIC CHOLECYSTITIS: Primary | ICD-10-CM

## 2018-11-21 DIAGNOSIS — R11.0 NAUSEA: ICD-10-CM

## 2018-11-21 DIAGNOSIS — R63.4 WEIGHT LOSS: ICD-10-CM

## 2018-11-21 RX ORDER — MICONAZOLE NITRATE 20 MG/G
CREAM TOPICAL 2 TIMES DAILY
Start: 2018-11-21 | End: 2019-03-02

## 2018-11-21 NOTE — PROGRESS NOTES
11/21/2018     Situation/Background:  -patient with 2-3 month history of nausea, vomiting, and epigastric pain with chronic flatulence and difficulty with constipation.  Underwent abdominal xray, abdominal ultrasound and NM Hepatobiliary Scan with GB EF which shows GB EF of about 38%.    Assessment:  -concerning for cholecystitis, may need cholecystectomy  Plan:  -referral for general surgery consult at [facility]  -family informed and will make appointment per their schedule.      Cathy Sargent CNP   [facility]  [phone] cell

## 2018-11-26 ENCOUNTER — OFFICE VISIT (OUTPATIENT)
Dept: SURGERY | Facility: CLINIC | Age: 78
End: 2018-11-26
Payer: COMMERCIAL

## 2018-11-26 VITALS
TEMPERATURE: 98.7 F | SYSTOLIC BLOOD PRESSURE: 134 MMHG | BODY MASS INDEX: 22.34 KG/M2 | DIASTOLIC BLOOD PRESSURE: 86 MMHG | HEIGHT: 66 IN | HEART RATE: 75 BPM | WEIGHT: 139 LBS

## 2018-11-26 DIAGNOSIS — K82.8 BILIARY DYSKINESIA: Primary | ICD-10-CM

## 2018-11-26 PROCEDURE — 99203 OFFICE O/P NEW LOW 30 MIN: CPT | Performed by: SURGERY

## 2018-11-26 ASSESSMENT — PAIN SCALES - GENERAL: PAINLEVEL: MILD PAIN (2)

## 2018-11-26 NOTE — H&P (VIEW-ONLY)
PCP:  Cathy Sargent    Chief complaint: Abdominal pain    History of Present Illness: Patient is a 78-year-old female who presents with her son for consideration of cholecystectomy. Basically, she has been having postprandial abdominal pain for at least a year or 2. As I review her chart, it appears that his been going on for longer than that. She complains of right upper quadrant and lower abdominal pain which occurs almost immediately after eating. Has bloating and nausea. No vomiting has been reported.    Workup has included a CT scan which was unremarkable. She does have a history of a abdominal aortic aneurysm which was repaired with a bifurcated stent graft. She also has a nonfunctional adrenal adenoma. She has had a hysterectomy.    An ultrasound was reportedly normal, although I don't have access to that report.    A recent HIDA scan with cholecystokinin injection showed an ejection fraction of 38 %.  She had significant abdominal pain with injection of cholecystokinin. She mentioned that the pain is the same pain she is having at home.    Her son and daughter have both had their gallbladders removed for similar problems and had relief of their symptoms.    She has a history of constipation, but that seems to be under control and her pain continues.    Histories:  Past Medical History:   Diagnosis Date     Adrenal adenoma     stable, thought not to be hormonally active     Arthritis      Depressive disorder      Hypertension      Rheumatic fever     thought to have had as a child     Small bowel obstruction      Thyroid disease        Past Surgical History:   Procedure Laterality Date     AAA REPAIR  2015    [facility] bifurcation graft     CARPAL TUNNEL RELEASE RT/LT Bilateral 2013     HYSTERECTOMY  2013     JOINT REPLACEMENT Right 2003     ORTHOPEDIC SURGERY       SPINE SURGERY  1981    cervical spine fusion     THYROIDECTOMY  2011       Family History   Problem Relation Age of Onset     Hypertension Mother       Coronary Artery Disease Mother      Coronary Artery Disease Father      Other Cancer Brother      Parkinsonism Other        Social History   Substance Use Topics     Smoking status: Former Smoker     Packs/day: 0.50     Types: Cigarettes     Quit date: 12/11/1994     Smokeless tobacco: Never Used     Alcohol use No       Current Outpatient Prescriptions   Medication Sig Dispense Refill     acetaminophen (TYLENOL) 500 MG tablet Take 1 tablet (500 mg) by mouth 3 times daily 90 tablet 1     acetaminophen (TYLENOL) 500 MG tablet Take 1-2 tablets (500-1,000 mg) by mouth 3 times daily as needed for mild pain 60 tablet 3     bisacodyl (DULCOLAX) 10 MG Suppository Place 10 mg rectally daily as needed for constipation       calcium carbonate (TUMS) 500 MG chewable tablet Take 1 tablet (500 mg) by mouth every 2 hours as needed for heartburn 150 tablet 1     carbidopa-levodopa (SINEMET)  MG per tablet Take 2 tablets by mouth 3 times daily 180 tablet 1     cholecalciferol 2000 units tablet Take 1 tablet by mouth daily 30 tablet 3     citalopram (CELEXA) 20 MG tablet Take 1 tablet (20 mg) by mouth daily 90 tablet 1     diclofenac (VOLTAREN) 1 % GEL topical gel Place onto the skin 4 times daily QID prn to back and neck       gabapentin (NEURONTIN) 100 MG capsule Take 2 capsules (200 mg) by mouth At Bedtime (Patient taking differently: Take 200 mg by mouth 2 times daily ) 90 capsule 1     levothyroxine (SYNTHROID/LEVOTHROID) 137 MCG tablet Take 1 tablet (137 mcg) by mouth daily 90 tablet 3     miconazole (MICONAZOLE ANTIFUNGAL) 2 % cream Apply topically 2 times daily for 15 days       multivitamin, therapeutic with minerals (MULTI-VITAMIN) TABS tablet Take 1 tablet by mouth daily 100 tablet 3     Nutritional Supplements (NUTRITIONAL SUPPLEMENT PO) Take by mouth daily       omeprazole (PRILOSEC) 20 MG CR capsule Take 1 capsule (20 mg) by mouth 2 times daily 90 capsule 3     polyethylene glycol (MIRALAX) powder Take 17 g by  mouth daily 510 g 1     polyethylene glycol 0.4%- propylene glycol 0.3% (SYSTANE ULTRA) 0.4-0.3 % SOLN ophthalmic solution Place 1 drop into both eyes 4 times daily as needed for dry eyes       ranitidine (ZANTAC) 150 MG capsule Take 1 capsule (150 mg) by mouth At Bedtime 180 capsule 3     senna (SENOKOT) 8.6 MG tablet Take 1 tablet by mouth daily as needed for constipation 120 tablet 1     triamcinolone (KENALOG) 0.1 % cream Apply topically 2 times daily as needed for irritation BID prn to feet       Zinc Oxide (DESITIN) 13 % CREA Externally apply topically 2 times daily         Allergies   Allergen Reactions     Penicillins Anaphylaxis, Rash and Shortness Of Breath     Aspirin Nausea     Atenolol Cough     Atorvastatin Muscle Pain (Myalgia)     Bupropion Nausea     Clindamycin Other (See Comments)     Constipation      Codeine Nausea     Ibuprofen Nausea     Stomach upset     Lovastatin Muscle Pain (Myalgia)     Cephalexin Rash     Neck reddness       Images:  Recent Results (from the past 744 hour(s))   NM Hepatobiliary Scan w GB EF    Narrative    NUCLEAR MEDICINE HEPATOBILIARY SCAN WITH GALLBLADDER EJECTION FRACTION    11/20/2018 10:30 AM     TECHNIQUE:   Scan was performed using 7.2 mCi of technetium 99m  mebrofenin. Patient was also given 1.3 mcg CCK.    HISTORY:  Weight loss, abdominal pain, nausea.  Flatulence,  eructation, and gas pain.    COMPARISON:   Nuclear Study: None.    Other Relevant Studies: None.    FINDINGS:  Liver and gallbladder have a normal appearance. Contrast  flows readily into the small bowel. Gallbladder ejection fraction is  38%. A gallbladder ejection fraction of less than 35-40% is considered  low.      Impression    IMPRESSION: Gallbladder ejection fraction is 38%. A gallbladder  ejection fraction of less then 35-40% is considered low.    DEREK GODOY MD       Labs:  Results for orders placed or performed during the hospital encounter of 11/20/18   NM Hepatobiliary Scan w GB EF     "Narrative    NUCLEAR MEDICINE HEPATOBILIARY SCAN WITH GALLBLADDER EJECTION FRACTION    11/20/2018 10:30 AM     TECHNIQUE:   Scan was performed using 7.2 mCi of technetium 99m  mebrofenin. Patient was also given 1.3 mcg CCK.    HISTORY:  Weight loss, abdominal pain, nausea.  Flatulence,  eructation, and gas pain.    COMPARISON:   Nuclear Study: None.    Other Relevant Studies: None.    FINDINGS:  Liver and gallbladder have a normal appearance. Contrast  flows readily into the small bowel. Gallbladder ejection fraction is  38%. A gallbladder ejection fraction of less than 35-40% is considered  low.      Impression    IMPRESSION: Gallbladder ejection fraction is 38%. A gallbladder  ejection fraction of less then 35-40% is considered low.    DEREK GODOY MD       ROS:  Constitutional - Denies fevers, weight loss  Pulmon - Denies SOB, dyspnea, hemoptysis, chronic cough or use of an inhaler  CV - Denies CP, SOB, lower extremity edema, difficulty w/ stairs  GI - Denies hematemesis, BRBPR, melena, chronic diarrhea   - Denies hematuria, difficulty voiding, h/o STDs  Hematology - Denies blood clotting disorders, chronic anemias  Dermatology - No melanomas or skin cancers    /86 (BP Location: Right arm, Patient Position: Sitting, Cuff Size: Adult Regular)  Pulse 75  Temp 98.7  F (37.1  C) (Tympanic)  Ht 1.676 m (5' 6\")  Wt 63 kg (139 lb)  BMI 22.44 kg/m2    Exam:  General - Alert and Oriented X4, NAD, well nourished  HEENT - Normocephalic, atraumatic  Neck - supple, no LAD  Lungs -respirations unlabored, chest wall excursion is normal  CV - Heart RRR  Abdomen - Soft, non-tender, +BS, no hepatosplenomegaly, no palpable masses  Groins -deferred  Rectal -deferred  Neuro - Full ROM, Strength 5/5 and major muscle groups, sensation intact  Extremities - No cyanosis, clubbing or edema.      Assessment and Plan: Patient presents with a fairly classic case of biliary dyskinesia. Her ejection fraction is in the gray zone in " that it's not terribly low. The protruding thing is that she had pain with injection of cholecystokinin. This gives me that she will respond favorably to surgical intervention.    I don't see any significant contraindications to the procedure. It may even have a positive effect on her constipation problems.    I spent about 30 minutes with her and her son discussing the procedure, risks, benefits, and alternatives of laparoscopic cholecystectomy. They are in agreement and wish to proceed. We'll make those arrangements and proceed electively.        Amadeo Sebastian MD FACS

## 2018-11-26 NOTE — PROGRESS NOTES
PCP:  Cathy Sargent    Chief complaint: Abdominal pain    History of Present Illness: Patient is a 78-year-old female who presents with her son for consideration of cholecystectomy. Basically, she has been having postprandial abdominal pain for at least a year or 2. As I review her chart, it appears that his been going on for longer than that. She complains of right upper quadrant and lower abdominal pain which occurs almost immediately after eating. Has bloating and nausea. No vomiting has been reported.    Workup has included a CT scan which was unremarkable. She does have a history of a abdominal aortic aneurysm which was repaired with a bifurcated stent graft. She also has a nonfunctional adrenal adenoma. She has had a hysterectomy.    An ultrasound was reportedly normal, although I don't have access to that report.    A recent HIDA scan with cholecystokinin injection showed an ejection fraction of 38 %.  She had significant abdominal pain with injection of cholecystokinin. She mentioned that the pain is the same pain she is having at home.    Her son and daughter have both had their gallbladders removed for similar problems and had relief of their symptoms.    She has a history of constipation, but that seems to be under control and her pain continues.    Histories:  Past Medical History:   Diagnosis Date     Adrenal adenoma     stable, thought not to be hormonally active     Arthritis      Depressive disorder      Hypertension      Rheumatic fever     thought to have had as a child     Small bowel obstruction      Thyroid disease        Past Surgical History:   Procedure Laterality Date     AAA REPAIR  2015    [facility] bifurcation graft     CARPAL TUNNEL RELEASE RT/LT Bilateral 2013     HYSTERECTOMY  2013     JOINT REPLACEMENT Right 2003     ORTHOPEDIC SURGERY       SPINE SURGERY  1981    cervical spine fusion     THYROIDECTOMY  2011       Family History   Problem Relation Age of Onset     Hypertension Mother       Coronary Artery Disease Mother      Coronary Artery Disease Father      Other Cancer Brother      Parkinsonism Other        Social History   Substance Use Topics     Smoking status: Former Smoker     Packs/day: 0.50     Types: Cigarettes     Quit date: 12/11/1994     Smokeless tobacco: Never Used     Alcohol use No       Current Outpatient Prescriptions   Medication Sig Dispense Refill     acetaminophen (TYLENOL) 500 MG tablet Take 1 tablet (500 mg) by mouth 3 times daily 90 tablet 1     acetaminophen (TYLENOL) 500 MG tablet Take 1-2 tablets (500-1,000 mg) by mouth 3 times daily as needed for mild pain 60 tablet 3     bisacodyl (DULCOLAX) 10 MG Suppository Place 10 mg rectally daily as needed for constipation       calcium carbonate (TUMS) 500 MG chewable tablet Take 1 tablet (500 mg) by mouth every 2 hours as needed for heartburn 150 tablet 1     carbidopa-levodopa (SINEMET)  MG per tablet Take 2 tablets by mouth 3 times daily 180 tablet 1     cholecalciferol 2000 units tablet Take 1 tablet by mouth daily 30 tablet 3     citalopram (CELEXA) 20 MG tablet Take 1 tablet (20 mg) by mouth daily 90 tablet 1     diclofenac (VOLTAREN) 1 % GEL topical gel Place onto the skin 4 times daily QID prn to back and neck       gabapentin (NEURONTIN) 100 MG capsule Take 2 capsules (200 mg) by mouth At Bedtime (Patient taking differently: Take 200 mg by mouth 2 times daily ) 90 capsule 1     levothyroxine (SYNTHROID/LEVOTHROID) 137 MCG tablet Take 1 tablet (137 mcg) by mouth daily 90 tablet 3     miconazole (MICONAZOLE ANTIFUNGAL) 2 % cream Apply topically 2 times daily for 15 days       multivitamin, therapeutic with minerals (MULTI-VITAMIN) TABS tablet Take 1 tablet by mouth daily 100 tablet 3     Nutritional Supplements (NUTRITIONAL SUPPLEMENT PO) Take by mouth daily       omeprazole (PRILOSEC) 20 MG CR capsule Take 1 capsule (20 mg) by mouth 2 times daily 90 capsule 3     polyethylene glycol (MIRALAX) powder Take 17 g by  mouth daily 510 g 1     polyethylene glycol 0.4%- propylene glycol 0.3% (SYSTANE ULTRA) 0.4-0.3 % SOLN ophthalmic solution Place 1 drop into both eyes 4 times daily as needed for dry eyes       ranitidine (ZANTAC) 150 MG capsule Take 1 capsule (150 mg) by mouth At Bedtime 180 capsule 3     senna (SENOKOT) 8.6 MG tablet Take 1 tablet by mouth daily as needed for constipation 120 tablet 1     triamcinolone (KENALOG) 0.1 % cream Apply topically 2 times daily as needed for irritation BID prn to feet       Zinc Oxide (DESITIN) 13 % CREA Externally apply topically 2 times daily         Allergies   Allergen Reactions     Penicillins Anaphylaxis, Rash and Shortness Of Breath     Aspirin Nausea     Atenolol Cough     Atorvastatin Muscle Pain (Myalgia)     Bupropion Nausea     Clindamycin Other (See Comments)     Constipation      Codeine Nausea     Ibuprofen Nausea     Stomach upset     Lovastatin Muscle Pain (Myalgia)     Cephalexin Rash     Neck reddness       Images:  Recent Results (from the past 744 hour(s))   NM Hepatobiliary Scan w GB EF    Narrative    NUCLEAR MEDICINE HEPATOBILIARY SCAN WITH GALLBLADDER EJECTION FRACTION    11/20/2018 10:30 AM     TECHNIQUE:   Scan was performed using 7.2 mCi of technetium 99m  mebrofenin. Patient was also given 1.3 mcg CCK.    HISTORY:  Weight loss, abdominal pain, nausea.  Flatulence,  eructation, and gas pain.    COMPARISON:   Nuclear Study: None.    Other Relevant Studies: None.    FINDINGS:  Liver and gallbladder have a normal appearance. Contrast  flows readily into the small bowel. Gallbladder ejection fraction is  38%. A gallbladder ejection fraction of less than 35-40% is considered  low.      Impression    IMPRESSION: Gallbladder ejection fraction is 38%. A gallbladder  ejection fraction of less then 35-40% is considered low.    DEREK GODOY MD       Labs:  Results for orders placed or performed during the hospital encounter of 11/20/18   NM Hepatobiliary Scan w GB EF     "Narrative    NUCLEAR MEDICINE HEPATOBILIARY SCAN WITH GALLBLADDER EJECTION FRACTION    11/20/2018 10:30 AM     TECHNIQUE:   Scan was performed using 7.2 mCi of technetium 99m  mebrofenin. Patient was also given 1.3 mcg CCK.    HISTORY:  Weight loss, abdominal pain, nausea.  Flatulence,  eructation, and gas pain.    COMPARISON:   Nuclear Study: None.    Other Relevant Studies: None.    FINDINGS:  Liver and gallbladder have a normal appearance. Contrast  flows readily into the small bowel. Gallbladder ejection fraction is  38%. A gallbladder ejection fraction of less than 35-40% is considered  low.      Impression    IMPRESSION: Gallbladder ejection fraction is 38%. A gallbladder  ejection fraction of less then 35-40% is considered low.    DEREK GODOY MD       ROS:  Constitutional - Denies fevers, weight loss  Pulmon - Denies SOB, dyspnea, hemoptysis, chronic cough or use of an inhaler  CV - Denies CP, SOB, lower extremity edema, difficulty w/ stairs  GI - Denies hematemesis, BRBPR, melena, chronic diarrhea   - Denies hematuria, difficulty voiding, h/o STDs  Hematology - Denies blood clotting disorders, chronic anemias  Dermatology - No melanomas or skin cancers    /86 (BP Location: Right arm, Patient Position: Sitting, Cuff Size: Adult Regular)  Pulse 75  Temp 98.7  F (37.1  C) (Tympanic)  Ht 1.676 m (5' 6\")  Wt 63 kg (139 lb)  BMI 22.44 kg/m2    Exam:  General - Alert and Oriented X4, NAD, well nourished  HEENT - Normocephalic, atraumatic  Neck - supple, no LAD  Lungs -respirations unlabored, chest wall excursion is normal  CV - Heart RRR  Abdomen - Soft, non-tender, +BS, no hepatosplenomegaly, no palpable masses  Groins -deferred  Rectal -deferred  Neuro - Full ROM, Strength 5/5 and major muscle groups, sensation intact  Extremities - No cyanosis, clubbing or edema.      Assessment and Plan: Patient presents with a fairly classic case of biliary dyskinesia. Her ejection fraction is in the gray zone in " that it's not terribly low. The protruding thing is that she had pain with injection of cholecystokinin. This gives me that she will respond favorably to surgical intervention.    I don't see any significant contraindications to the procedure. It may even have a positive effect on her constipation problems.    I spent about 30 minutes with her and her son discussing the procedure, risks, benefits, and alternatives of laparoscopic cholecystectomy. They are in agreement and wish to proceed. We'll make those arrangements and proceed electively.        Amadeo Sebastian MD FACS

## 2018-11-26 NOTE — MR AVS SNAPSHOT
After Visit Summary   11/26/2018    Mehreen Camara    MRN: [id number]           Patient Information     Date Of Birth          1940        Visit Information        Provider Department      11/26/2018 11:00 AM Amadeo Sebastian MD [facility]        Today's Diagnoses     Biliary dyskinesia    -  1      Care Instructions    Per Physician's instructions            Follow-ups after your visit        Your next 10 appointments already scheduled     May 07, 2019  2:30 PM CDT   (Arrive by 2:15 PM)   Return Movement Disorder with Chris Monterroso MD   [facility] ([facility] [place])    [address] [place] [id number] [phone]              Who to contact     If you have questions or need follow up information about today's clinic visit or your schedule please contact [facility] directly at [phone].  Normal or non-critical lab and imaging results will be communicated to you by MyChart, letter or phone within 4 business days after the clinic has received the results. If you do not hear from us within 7 days, please contact the clinic through MyChart or phone. If you have a critical or abnormal lab result, we will notify you by phone as soon as possible.  Submit refill requests through [employer] or call your pharmacy and they will forward the refill request to us. Please allow 3 business days for your refill to be completed.          Additional Information About Your Visit        MyChart Information     [employer] gives you secure access to your electronic health record. If you see a primary care provider, you can also send messages to your care team and make appointments. If you have questions, please call your primary care clinic.  If you do not have a primary care provider, please call [phone] and they will assist you.        Care EveryWhere ID     This is your Care EveryWhere ID. This could be used by other  "organizations to access your [place] medical records  [id number]        Your Vitals Were     Pulse Temperature Height BMI (Body Mass Index)          75 98.7  F (37.1  C) (Tympanic) 1.676 m (5' 6\") 22.44 kg/m2         Blood Pressure from Last 3 Encounters:   11/26/18 134/86   11/20/18 149/87   11/13/18 149/87    Weight from Last 3 Encounters:   11/26/18 63 kg (139 lb)   11/20/18 64 kg (141 lb)   11/13/18 64 kg (141 lb)              Today, you had the following     No orders found for display         Today's Medication Changes          These changes are accurate as of 11/26/18 12:25 PM.  If you have any questions, ask your nurse or doctor.               These medicines have changed or have updated prescriptions.        Dose/Directions    gabapentin 100 MG capsule   Commonly known as:  NEURONTIN   This may have changed:  when to take this   Used for:  Chronic bilateral low back pain with bilateral sciatica        Dose:  200 mg   Take 2 capsules (200 mg) by mouth At Bedtime   Quantity:  90 capsule   Refills:  1                Primary Care Provider Office Phone # Fax #    Cathy HAILEE Sargent [phone]       [address] [place] [id number]        Equal Access to Services     EDDA YANG : Hadii dora romano hadasho Sotoñoali, waaxda luqadaha, qaybta kaalmada adeegyada, waxay jojo casanova. So [facility] [phone].    ATENCIÓN: Si habla español, tiene a camacho disposición servicios gratuitos de asistencia lingüística. Llame al [phone].    We comply with applicable federal civil rights laws and [place] laws. We do not discriminate on the basis of race, color, national origin, age, disability, sex, sexual orientation, or gender identity.            Thank you!     Thank you for choosing [facility]  for your care. Our goal is always to provide you with excellent care. Hearing back from our patients is one way we can continue to improve our services. Please take a few " minutes to complete the written survey that you may receive in the mail after your visit with us. Thank you!             Your Updated Medication List - Protect others around you: Learn how to safely use, store and throw away your medicines at www.disposemymeds.org.          This list is accurate as of 11/26/18 12:25 PM.  Always use your most recent med list.                   Brand Name Dispense Instructions for use Diagnosis    * acetaminophen 500 MG tablet    TYLENOL    60 tablet    Take 1-2 tablets (500-1,000 mg) by mouth 3 times daily as needed for mild pain    Primary osteoarthritis involving multiple joints       * acetaminophen 500 MG tablet    TYLENOL    90 tablet    Take 1 tablet (500 mg) by mouth 3 times daily    Chronic bilateral low back pain with bilateral sciatica       bisacodyl 10 MG Suppository    DULCOLAX     Place 10 mg rectally daily as needed for constipation        calcium carbonate 500 MG chewable tablet    TUMS    150 tablet    Take 1 tablet (500 mg) by mouth every 2 hours as needed for heartburn    Nausea       carbidopa-levodopa  MG per tablet    SINEMET    180 tablet    Take 2 tablets by mouth 3 times daily    Movement disorder       cholecalciferol 2000 units tablet     30 tablet    Take 1 tablet by mouth daily    Age-related osteoporosis without current pathological fracture       citalopram 20 MG tablet    celeXA    90 tablet    Take 1 tablet (20 mg) by mouth daily    Episode of recurrent major depressive disorder, unspecified depression episode severity (H)       DESITIN 13 % Crea   Generic drug:  Zinc Oxide      Externally apply topically 2 times daily        diclofenac 1 % topical gel    VOLTAREN     Place onto the skin 4 times daily QID prn to back and neck    Primary osteoarthritis involving multiple joints       gabapentin 100 MG capsule    NEURONTIN    90 capsule    Take 2 capsules (200 mg) by mouth At Bedtime    Chronic bilateral low back pain with bilateral sciatica        levothyroxine 137 MCG tablet    SYNTHROID/LEVOTHROID    90 tablet    Take 1 tablet (137 mcg) by mouth daily    Acquired hypothyroidism       miconazole 2 % cream    MICONAZOLE ANTIFUNGAL     Apply topically 2 times daily for 15 days    Dermatitis       multivitamin, therapeutic with minerals Tabs tablet     100 tablet    Take 1 tablet by mouth daily    Takes dietary supplements       NUTRITIONAL SUPPLEMENT PO      Take by mouth daily        omeprazole 20 MG DR capsule    priLOSEC    90 capsule    Take 1 capsule (20 mg) by mouth 2 times daily    Harris's esophagus with dysplasia       polyethylene glycol 0.4%- propylene glycol 0.3% 0.4-0.3 % Soln ophthalmic solution    SYSTANE ULTRA     Place 1 drop into both eyes 4 times daily as needed for dry eyes    Dry eyes       polyethylene glycol powder    MIRALAX    510 g    Take 17 g by mouth daily    Chronic idiopathic constipation       ranitidine 150 MG capsule    ZANTAC    180 capsule    Take 1 capsule (150 mg) by mouth At Bedtime    Harris's esophagus with dysplasia       senna 8.6 MG tablet    SENOKOT    120 tablet    Take 1 tablet by mouth daily as needed for constipation    Chronic idiopathic constipation       triamcinolone 0.1 % cream    KENALOG     Apply topically 2 times daily as needed for irritation BID prn to feet    Dermatitis       * Notice:  This list has 2 medication(s) that are the same as other medications prescribed for you. Read the directions carefully, and ask your doctor or other care provider to review them with you.

## 2018-11-26 NOTE — NURSING NOTE
"Initial /86 (BP Location: Right arm, Patient Position: Sitting, Cuff Size: Adult Regular)  Pulse 75  Temp 98.7  F (37.1  C) (Tympanic)  Ht 1.676 m (5' 6\")  Wt 67.6 kg (149 lb)  BMI 24.05 kg/m2 Estimated body mass index is 24.05 kg/(m^2) as calculated from the following:    Height as of this encounter: 1.676 m (5' 6\").    Weight as of this encounter: 67.6 kg (149 lb). .    Diya Jon CMA    "

## 2018-11-26 NOTE — LETTER
11/26/2018         RE: Mehreen Camara  [facility]  [address]  [facility] [address]        Dear Colleague,    Thank you for referring your patient, Mehreen Camara, to the [facility]. Please see a copy of my visit note below.    PCP:  Cathy Sargent    Chief complaint: Abdominal pain    History of Present Illness: Patient is a 78-year-old female who presents with her son for consideration of cholecystectomy. Basically, she has been having postprandial abdominal pain for at least a year or 2. As I review her chart, it appears that his been going on for longer than that. She complains of right upper quadrant and lower abdominal pain which occurs almost immediately after eating. Has bloating and nausea. No vomiting has been reported.    Workup has included a CT scan which was unremarkable. She does have a history of a abdominal aortic aneurysm which was repaired with a bifurcated stent graft. She also has a nonfunctional adrenal adenoma. She has had a hysterectomy.    An ultrasound was reportedly normal, although I don't have access to that report.    A recent HIDA scan with cholecystokinin injection showed an ejection fraction of 38 %.  She had significant abdominal pain with injection of cholecystokinin. She mentioned that the pain is the same pain she is having at home.    Her son and daughter have both had their gallbladders removed for similar problems and had relief of their symptoms.    She has a history of constipation, but that seems to be under control and her pain continues.    Histories:  Past Medical History:   Diagnosis Date     Adrenal adenoma     stable, thought not to be hormonally active     Arthritis      Depressive disorder      Hypertension      Rheumatic fever     thought to have had as a child     Small bowel obstruction      Thyroid disease        Past Surgical History:   Procedure Laterality Date     AAA REPAIR  2015    [facility] bifurcation graft      CARPAL TUNNEL RELEASE RT/LT Bilateral 2013     HYSTERECTOMY  2013     JOINT REPLACEMENT Right 2003     ORTHOPEDIC SURGERY       SPINE SURGERY  1981    cervical spine fusion     THYROIDECTOMY  2011       Family History   Problem Relation Age of Onset     Hypertension Mother      Coronary Artery Disease Mother      Coronary Artery Disease Father      Other Cancer Brother      Parkinsonism Other        Social History   Substance Use Topics     Smoking status: Former Smoker     Packs/day: 0.50     Types: Cigarettes     Quit date: 12/11/1994     Smokeless tobacco: Never Used     Alcohol use No       Current Outpatient Prescriptions   Medication Sig Dispense Refill     acetaminophen (TYLENOL) 500 MG tablet Take 1 tablet (500 mg) by mouth 3 times daily 90 tablet 1     acetaminophen (TYLENOL) 500 MG tablet Take 1-2 tablets (500-1,000 mg) by mouth 3 times daily as needed for mild pain 60 tablet 3     bisacodyl (DULCOLAX) 10 MG Suppository Place 10 mg rectally daily as needed for constipation       calcium carbonate (TUMS) 500 MG chewable tablet Take 1 tablet (500 mg) by mouth every 2 hours as needed for heartburn 150 tablet 1     carbidopa-levodopa (SINEMET)  MG per tablet Take 2 tablets by mouth 3 times daily 180 tablet 1     cholecalciferol 2000 units tablet Take 1 tablet by mouth daily 30 tablet 3     citalopram (CELEXA) 20 MG tablet Take 1 tablet (20 mg) by mouth daily 90 tablet 1     diclofenac (VOLTAREN) 1 % GEL topical gel Place onto the skin 4 times daily QID prn to back and neck       gabapentin (NEURONTIN) 100 MG capsule Take 2 capsules (200 mg) by mouth At Bedtime (Patient taking differently: Take 200 mg by mouth 2 times daily ) 90 capsule 1     levothyroxine (SYNTHROID/LEVOTHROID) 137 MCG tablet Take 1 tablet (137 mcg) by mouth daily 90 tablet 3     miconazole (MICONAZOLE ANTIFUNGAL) 2 % cream Apply topically 2 times daily for 15 days       multivitamin, therapeutic with minerals (MULTI-VITAMIN) TABS tablet  Take 1 tablet by mouth daily 100 tablet 3     Nutritional Supplements (NUTRITIONAL SUPPLEMENT PO) Take by mouth daily       omeprazole (PRILOSEC) 20 MG CR capsule Take 1 capsule (20 mg) by mouth 2 times daily 90 capsule 3     polyethylene glycol (MIRALAX) powder Take 17 g by mouth daily 510 g 1     polyethylene glycol 0.4%- propylene glycol 0.3% (SYSTANE ULTRA) 0.4-0.3 % SOLN ophthalmic solution Place 1 drop into both eyes 4 times daily as needed for dry eyes       ranitidine (ZANTAC) 150 MG capsule Take 1 capsule (150 mg) by mouth At Bedtime 180 capsule 3     senna (SENOKOT) 8.6 MG tablet Take 1 tablet by mouth daily as needed for constipation 120 tablet 1     triamcinolone (KENALOG) 0.1 % cream Apply topically 2 times daily as needed for irritation BID prn to feet       Zinc Oxide (DESITIN) 13 % CREA Externally apply topically 2 times daily         Allergies   Allergen Reactions     Penicillins Anaphylaxis, Rash and Shortness Of Breath     Aspirin Nausea     Atenolol Cough     Atorvastatin Muscle Pain (Myalgia)     Bupropion Nausea     Clindamycin Other (See Comments)     Constipation      Codeine Nausea     Ibuprofen Nausea     Stomach upset     Lovastatin Muscle Pain (Myalgia)     Cephalexin Rash     Neck reddness       Images:  Recent Results (from the past 744 hour(s))   NM Hepatobiliary Scan w GB EF    Narrative    NUCLEAR MEDICINE HEPATOBILIARY SCAN WITH GALLBLADDER EJECTION FRACTION    11/20/2018 10:30 AM     TECHNIQUE:   Scan was performed using 7.2 mCi of technetium 99m  mebrofenin. Patient was also given 1.3 mcg CCK.    HISTORY:  Weight loss, abdominal pain, nausea.  Flatulence,  eructation, and gas pain.    COMPARISON:   Nuclear Study: None.    Other Relevant Studies: None.    FINDINGS:  Liver and gallbladder have a normal appearance. Contrast  flows readily into the small bowel. Gallbladder ejection fraction is  38%. A gallbladder ejection fraction of less than 35-40% is considered  low.       "Impression    IMPRESSION: Gallbladder ejection fraction is 38%. A gallbladder  ejection fraction of less then 35-40% is considered low.    DEREK GODOY MD       Labs:  Results for orders placed or performed during the hospital encounter of 11/20/18   NM Hepatobiliary Scan w GB EF    Narrative    NUCLEAR MEDICINE HEPATOBILIARY SCAN WITH GALLBLADDER EJECTION FRACTION    11/20/2018 10:30 AM     TECHNIQUE:   Scan was performed using 7.2 mCi of technetium 99m  mebrofenin. Patient was also given 1.3 mcg CCK.    HISTORY:  Weight loss, abdominal pain, nausea.  Flatulence,  eructation, and gas pain.    COMPARISON:   Nuclear Study: None.    Other Relevant Studies: None.    FINDINGS:  Liver and gallbladder have a normal appearance. Contrast  flows readily into the small bowel. Gallbladder ejection fraction is  38%. A gallbladder ejection fraction of less than 35-40% is considered  low.      Impression    IMPRESSION: Gallbladder ejection fraction is 38%. A gallbladder  ejection fraction of less then 35-40% is considered low.    DEREK GODOY MD       ROS:  Constitutional - Denies fevers, weight loss  Pulmon - Denies SOB, dyspnea, hemoptysis, chronic cough or use of an inhaler  CV - Denies CP, SOB, lower extremity edema, difficulty w/ stairs  GI - Denies hematemesis, BRBPR, melena, chronic diarrhea   - Denies hematuria, difficulty voiding, h/o STDs  Hematology - Denies blood clotting disorders, chronic anemias  Dermatology - No melanomas or skin cancers    /86 (BP Location: Right arm, Patient Position: Sitting, Cuff Size: Adult Regular)  Pulse 75  Temp 98.7  F (37.1  C) (Tympanic)  Ht 1.676 m (5' 6\")  Wt 63 kg (139 lb)  BMI 22.44 kg/m2    Exam:  General - Alert and Oriented X4, NAD, well nourished  HEENT - Normocephalic, atraumatic  Neck - supple, no LAD  Lungs -respirations unlabored, chest wall excursion is normal  CV - Heart RRR  Abdomen - Soft, non-tender, +BS, no hepatosplenomegaly, no palpable masses  Groins " -deferred  Rectal -deferred  Neuro - Full ROM, Strength 5/5 and major muscle groups, sensation intact  Extremities - No cyanosis, clubbing or edema.      Assessment and Plan: Patient presents with a fairly classic case of biliary dyskinesia. Her ejection fraction is in the gray zone in that it's not terribly low. The protruding thing is that she had pain with injection of cholecystokinin. This gives me that she will respond favorably to surgical intervention.    I don't see any significant contraindications to the procedure. It may even have a positive effect on her constipation problems.    I spent about 30 minutes with her and her son discussing the procedure, risks, benefits, and alternatives of laparoscopic cholecystectomy. They are in agreement and wish to proceed. We'll make those arrangements and proceed electively.        Amadeo Sebastian MD FACS    Again, thank you for allowing me to participate in the care of your patient.        Sincerely,        Amadeo Sebastian MD

## 2018-11-27 ENCOUNTER — MEDICAL CORRESPONDENCE (OUTPATIENT)
Dept: HEALTH INFORMATION MANAGEMENT | Facility: CLINIC | Age: 78
End: 2018-11-27

## 2018-12-04 ENCOUNTER — TRANSFERRED RECORDS (OUTPATIENT)
Dept: HEALTH INFORMATION MANAGEMENT | Facility: CLINIC | Age: 78
End: 2018-12-04

## 2018-12-04 ENCOUNTER — ASSISTED LIVING VISIT (OUTPATIENT)
Dept: GERIATRICS | Facility: CLINIC | Age: 78
End: 2018-12-04
Payer: COMMERCIAL

## 2018-12-04 VITALS
SYSTOLIC BLOOD PRESSURE: 134 MMHG | TEMPERATURE: 98.8 F | OXYGEN SATURATION: 99 % | BODY MASS INDEX: 23.4 KG/M2 | WEIGHT: 145 LBS | RESPIRATION RATE: 20 BRPM | HEART RATE: 72 BPM | DIASTOLIC BLOOD PRESSURE: 76 MMHG

## 2018-12-04 DIAGNOSIS — Z95.828 S/P AAA REPAIR USING BIFURCATION GRAFT: ICD-10-CM

## 2018-12-04 DIAGNOSIS — G23.8 MULTIPLE SYSTEM ATROPHY (H): ICD-10-CM

## 2018-12-04 DIAGNOSIS — M54.42 CHRONIC BILATERAL LOW BACK PAIN WITH BILATERAL SCIATICA: ICD-10-CM

## 2018-12-04 DIAGNOSIS — I10 ESSENTIAL HYPERTENSION: ICD-10-CM

## 2018-12-04 DIAGNOSIS — I71.40 ANEURYSM OF ABDOMINAL VESSEL (H): ICD-10-CM

## 2018-12-04 DIAGNOSIS — G57.93 NEUROPATHIC PAIN OF BOTH LEGS: ICD-10-CM

## 2018-12-04 DIAGNOSIS — K81.1 CHRONIC CHOLECYSTITIS: Primary | ICD-10-CM

## 2018-12-04 DIAGNOSIS — R63.4 WEIGHT LOSS: ICD-10-CM

## 2018-12-04 DIAGNOSIS — R11.0 NAUSEA: ICD-10-CM

## 2018-12-04 DIAGNOSIS — R10.84 ABDOMINAL PAIN, GENERALIZED: ICD-10-CM

## 2018-12-04 DIAGNOSIS — M54.41 CHRONIC BILATERAL LOW BACK PAIN WITH BILATERAL SCIATICA: ICD-10-CM

## 2018-12-04 DIAGNOSIS — F43.23 ADJUSTMENT DISORDER WITH MIXED ANXIETY AND DEPRESSED MOOD: ICD-10-CM

## 2018-12-04 DIAGNOSIS — I95.1 ORTHOSTATIC HYPOTENSION DYSAUTONOMIC SYNDROME: ICD-10-CM

## 2018-12-04 DIAGNOSIS — G89.29 CHRONIC BILATERAL LOW BACK PAIN WITH BILATERAL SCIATICA: ICD-10-CM

## 2018-12-04 DIAGNOSIS — E03.9 ACQUIRED HYPOTHYROIDISM: ICD-10-CM

## 2018-12-04 DIAGNOSIS — Z86.79 S/P AAA REPAIR USING BIFURCATION GRAFT: ICD-10-CM

## 2018-12-04 DIAGNOSIS — G90.3 MULTIPLE SYSTEM ATROPHY (H): ICD-10-CM

## 2018-12-04 NOTE — LETTER
2018        RE: Mehreen OBRIEN Jax  [facility]  [address]  [facility] [address]        PRE-OP EVALUATION:    [facility] Medical Record Number:  [id number]  Place of Service where encounter took place:  [facility] ([facility] [[id number]]  Today's date: 2018    [facility]  [address]  [facility] [address]  [facility] [id number] [phone]  Dept: [phone]    PRE-OP EVALUATION:  Today's date: 2018    Mehreen Camara (: 1940) presents for pre-operative evaluation assessment as requested by Dr. Sebastian.  She requires evaluation and anesthesia risk assessment prior to undergoing surgery/procedure for treatment of chronic cholecystitis .    Proposed Surgery/ Procedure: laparoscopic cholecystectomy  Date of Surgery/ Procedure: 18  Time of Surgery/ Procedure: 11:00 am  Hospital/Surgical Facility: [facility]  [facility] in [facility]  Primary Physician: Cathy Sargent  Type of Anesthesia Anticipated: to be determined    Patient has a Health Care Directive or Living Will:  no    1. YES - DO YOU HAVE A HISTORY OF HEART ATTACK, STROKE, STENT, BYPASS OR SURGERY ON AN ARTERY IN THE HEAD, NECK, HEART OR LEG? AAA repair with stent  2. NO - Do you ever have any pain or discomfort in your chest?  3. NO - Do you have a history of  Heart Failure?  4. NO - Are you troubled by shortness of breath when: walking on the level, up a slight hill or at night?  5. NO - Do you currently have a cold, bronchitis or other respiratory infection?  6. YES - DO YOU HAVE A COUGH, SHORTNESS OF BREATH OR WHEEZING? Occasional wheezing  7. NO - Do you sometimes get pains in the calves of your legs when you walk?  8. NO - Do you or anyone in your family have previous history of blood clots?  9. NO - Do you or does anyone in your family have a serious bleeding problem such as prolonged bleeding following surgeries or cuts?  10. NO - Have you ever had problems with anemia or been told to  take iron pills?  11. NO - Have you had any abnormal blood loss such as black, tarry or bloody stools, or abnormal vaginal bleeding?  12. NO - Have you ever had a blood transfusion?  13. NO - Have you or any of your relatives ever had problems with anesthesia?  14. NO - Do you have sleep apnea, excessive snoring or daytime drowsiness?  15. NO - Do you have any prosthetic heart valves?  16. YES - DO YOU HAVE PROSTHETIC JOINTS? L knee and R hip  17. NO - Is there any chance that you may be pregnant?      HPI:     HPI related to upcoming procedure: patient with several month history of GI distress, nausea, vomiting, constipation, diarrhea, flatulence and abdominal pain found to have decreased gallbladder function consistent with chronic cholecystitis      DEPRESSION - Patient has a long history of Depression of moderate severity requiring medication for control with recent symptoms being stable..Current symptoms of depression include depressed mood, diminished interest or pleasure in activities, weight loss, decreased appetite, insomnia.                                                                                                                                                                                    .  HYPOTHYROIDISM - Patient has a longstanding history of chronic Hypothyroidism. Patient has been doing well, noting no tremor, insomnia, hair loss or changes in skin texture. Continues to take medications as directed, without adverse reactions or side effects. Last TSH No results found for: TSH.  Per Care Everywhere, TSH in 7/2018 was 3.83                                                                                                                                                                                                                     .    MEDICAL HISTORY:     Patient Active Problem List    Diagnosis Date Noted     Multiple system atrophy (H) 11/14/2018     Priority: Medium     Movement disorder  09/19/2018     Priority: Medium     Rheumatic aortic stenosis 09/19/2018     Priority: Medium     Disorder of bursae and tendons in shoulder region 09/18/2018     Priority: Medium     Overview:   Created by Conversion    Replacement Utility updated for latest IMO load       Adjustment disorder with anxious mood 09/17/2018     Priority: Medium     Adjustment disorder with mixed anxiety and depressed mood 09/17/2018     Priority: Medium     Aneurysm of abdominal vessel (H) 09/17/2018     Priority: Medium     Overview:   Created by Conversion  [facility] Annotation: Jun 8 2011  8:07AM - Danni Ortiz: 4.4 on 11/2013.    Needs 6-12 month u/s or CT.    Replacement Utility updated for latest IMO load       Harris's esophagus 09/17/2018     Priority: Medium     Overview:   Created by Conversion  [facility] Annotation: Feb  3 2012  8:32AM - Cameron Obando: Needs EGD 2/2017       Bradycardia 09/17/2018     Priority: Medium     Cataract 09/17/2018     Priority: Medium     Overview:   Created by Conversion       Major depressive disorder, recurrent episode (H) 09/17/2018     Priority: Medium     Overview:   Created by Conversion    Replacement Utility updated for latest IMO load       Constipation 09/17/2018     Priority: Medium     Dizziness 09/17/2018     Priority: Medium     Overview:   Created by Conversion       Dyslipidemia 09/17/2018     Priority: Medium     Overview:   Created by Conversion       Esophageal reflux 09/17/2018     Priority: Medium     Overview:   Created by Conversion       Hypertension 09/17/2018     Priority: Medium     Overview:   Created by Conversion    Replacement Utility updated for latest IMO load       Hypothyroidism 09/17/2018     Priority: Medium     Overview:   Created by Conversion    Replacement Utility updated for latest IMO load       Insomnia due to anxiety and fear 09/17/2018     Priority: Medium     Lower back pain 09/17/2018     Priority: Medium     Osteoarthrosis 09/17/2018      Priority: Medium     Overview:   Created by Conversion    Replacement Utility updated for latest IMO load       Disorder of bone and cartilage 09/17/2018     Priority: Medium     Overview:   Created by Conversion  [facility] Annotation: Dec 13 2012  7:41Roberta Kelly: vit D/Ca, f/u   Dexa needed 1/2014.    Replacement Utility updated for latest IMO load       Lower extremity weakness 07/04/2018     Priority: Medium     Orthostatic hypotension dysautonomic syndrome (H) 09/22/2017     Priority: Medium     Primary insomnia 07/04/2017     Priority: Medium     Urinary incontinence in female 05/07/2017     Priority: Medium     Weakness 05/04/2017     Priority: Medium     S/P AAA repair using bifurcation graft 11/30/2015     Priority: Medium     Adrenal adenoma 11/20/2015     Priority: Medium     Overview:   Current hormonal evaluation through endocrinology        Past Medical History:   Diagnosis Date     Adrenal adenoma     stable, thought not to be hormonally active     Arthritis      Depressive disorder      Hypertension      Rheumatic fever     thought to have had as a child     Small bowel obstruction (H)      Thyroid disease      Past Surgical History:   Procedure Laterality Date     AAA REPAIR  2015    [facility] bifurcation graft     CARPAL TUNNEL RELEASE RT/LT Bilateral 2013     HYSTERECTOMY  2013     JOINT REPLACEMENT Right 2003     ORTHOPEDIC SURGERY       SPINE SURGERY  1981    cervical spine fusion     THYROIDECTOMY  2011     Current Outpatient Prescriptions   Medication Sig Dispense Refill     acetaminophen (TYLENOL) 500 MG tablet Take 1 tablet (500 mg) by mouth 3 times daily 90 tablet 1     acetaminophen (TYLENOL) 500 MG tablet Take 1-2 tablets (500-1,000 mg) by mouth 3 times daily as needed for mild pain 60 tablet 3     bisacodyl (DULCOLAX) 10 MG Suppository Place 10 mg rectally daily as needed for constipation       calcium carbonate (TUMS) 500 MG chewable tablet Take 1 tablet (500 mg) by mouth every 2  hours as needed for heartburn 150 tablet 1     carbidopa-levodopa (SINEMET)  MG per tablet Take 2 tablets by mouth 3 times daily (Patient taking differently: Take 2 tablets by mouth 3 times daily 1/1/2 tablets 3 times a day) 180 tablet 1     cholecalciferol 2000 units tablet Take 1 tablet by mouth daily 30 tablet 3     citalopram (CELEXA) 20 MG tablet Take 1 tablet (20 mg) by mouth daily 90 tablet 1     diclofenac (VOLTAREN) 1 % GEL topical gel Place onto the skin 4 times daily QID prn to back and neck       gabapentin (NEURONTIN) 100 MG capsule Take 2 capsules (200 mg) by mouth At Bedtime (Patient taking differently: Take 200 mg by mouth 2 times daily ) 90 capsule 1     levothyroxine (SYNTHROID/LEVOTHROID) 137 MCG tablet Take 1 tablet (137 mcg) by mouth daily 90 tablet 3     multivitamin, therapeutic with minerals (MULTI-VITAMIN) TABS tablet Take 1 tablet by mouth daily 100 tablet 3     Nutritional Supplements (NUTRITIONAL SUPPLEMENT PO) Take by mouth daily       omeprazole (PRILOSEC) 20 MG CR capsule Take 1 capsule (20 mg) by mouth 2 times daily 90 capsule 3     polyethylene glycol (MIRALAX) powder Take 17 g by mouth daily 510 g 1     polyethylene glycol 0.4%- propylene glycol 0.3% (SYSTANE ULTRA) 0.4-0.3 % SOLN ophthalmic solution Place 1 drop into both eyes 4 times daily as needed for dry eyes       ranitidine (ZANTAC) 150 MG capsule Take 1 capsule (150 mg) by mouth At Bedtime 180 capsule 3     senna (SENOKOT) 8.6 MG tablet Take 1 tablet by mouth daily as needed for constipation 120 tablet 1     triamcinolone (KENALOG) 0.1 % cream Apply topically 2 times daily as needed for irritation BID prn to feet       Zinc Oxide (DESITIN) 13 % CREA Externally apply topically 2 times daily       OTC products: tylenol     Allergies   Allergen Reactions     Penicillins Anaphylaxis, Rash and Shortness Of Breath     Aspirin Nausea     Atenolol Cough     Atorvastatin Muscle Pain (Myalgia)     Bupropion Nausea      Clindamycin Other (See Comments)     Constipation      Codeine Nausea     Ibuprofen Nausea     Stomach upset     Lovastatin Muscle Pain (Myalgia)     Cephalexin Rash     Neck reddness      Latex Allergy: NO    Social History   Substance Use Topics     Smoking status: Former Smoker     Packs/day: 0.50     Types: Cigarettes     Quit date: 12/11/1994     Smokeless tobacco: Never Used     Alcohol use No     History   Drug Use No       REVIEW OF SYSTEMS:   4 point ROS including Respiratory, CV, GI and , other than that noted in the HPI,  is negative    EXAM:   /76  Pulse 72  Temp 98.8  F (37.1  C)  Resp 20  Wt 145 lb (65.8 kg)  SpO2 99%  BMI 23.4 kg/m2   BP Readings from Last 6 Encounters:   12/04/18 134/76   11/26/18 134/86   11/20/18 149/87   11/13/18 149/87   11/06/18 127/82   10/23/18 121/77      GENERAL APPEARANCE:  Alert, thin, anxious  RESP:  respiratory effort and palpation of chest normal, lungs clear to auscultation , no respiratory distress  CV:  Palpation and auscultation of heart done , regular rate and rhythm, no murmur, rub, or gallop, no edema  ABDOMEN:  normal bowel sounds, soft, nontender, no hepatosplenomegaly or other masses  M/S:   Gait and station normal    DIAGNOSTICS:   EKG: Not indicated due to non-vascular surgery and last ekg on 7/4/18 (within 30 days for CAD history or last year for cardiac risk factors)    ECHO:  9/18/18 per Care Everywhere     .  Transferred Records on 11/08/2018   Component Date Value Ref Range Status     Sodium 11/08/2018 139  136 - 145 mmol/L Final     Potassium 11/08/2018 4.1  3.5 - 5.0 mmol/L Final     Chloride 11/08/2018 99  98 - 107 mmol/L Final     Carbon Dioxide 11/08/2018 32* 22 - 31 mmol/L Final     Anion Gap 11/08/2018 8  5 - 18 mmol/L Final     Glucose 11/08/2018 97  70 - 125 mg/dL Final     Urea Nitrogen 11/08/2018 15  6 - 26 mg/dL Final     Creatinine 11/08/2018 0.69  0.60 - 1.10 mg/dL Final     Calcium 11/08/2018 9.4  8.5 - 10.5 mg/dL Final      Protein Total 11/08/2018 7.2  6.0 - 8.0 g/dL Final     Albumin 11/08/2018 3.7  3.5 - 5.0 g/dL Final     Bilirubin Total 11/08/2018 0.5  0.0 - 1.0 mg/dL Final     Alkaline Phosphatase 11/08/2018 63  45 - 120 U/L Final     AST 11/08/2018 17  0 - 40 U/L Final     ALT 11/08/2018 <9  0 - 45 U/L Final     GFR Estimate 11/08/2018 >60  >60 ml/min/1.73m2 Final     GFR Estimate If Black 11/08/2018 >60  >60 ml/min/1.73m2 Final     WBC 11/08/2018 5.8  4.0 - 11.0 thou/uL Final     RBC Count 11/08/2018 4.48  3.80 - 5.40 mill/uL Final     Hemoglobin 11/08/2018 13.7  12.0 - 16.0 g/dL Final     Hematocrit 11/08/2018 43.2  35.0 - 47.0 % Final     MCV 11/08/2018 96  80 - 100 fL Final     MCH 11/08/2018 30.6  27.0 - 34.0 pg Final     MCHC 11/08/2018 31.7* 32.0 - 36.0 g/dL Final     RDW 11/08/2018 11.9  11.0 - 14.5 % Final     Platelet Count 11/08/2018 217  140 - 440 thou/uL Final     Differential 11/08/2018 See Scanned Document   Final          Recent Labs   Lab Test 11/08/18   HGB  13.7   PLT  217   NA  139   POTASSIUM  4.1   CR  0.69        IMPRESSION:   Reason for surgery/procedure: nausea, vomiting, abdominal pain  Diagnosis/reason for consult: chronic cholecystits    The proposed surgical procedure is considered INTERMEDIATE risk.    REVISED CARDIAC RISK INDEX  The patient has the following serious cardiovascular risks for perioperative complications such as (MI, PE, VFib and 3  AV Block):  No serious cardiac risks  INTERPRETATION: 0 risks: Class I (very low risk - 0.4% complication rate)    The patient has the following additional risks for perioperative complications:  No identified additional risks      ICD-10-CM    1. Chronic cholecystitis K81.1    2. Abdominal pain, generalized R10.84    3. Nausea R11.0    4. Weight loss R63.4    5. Multiple system atrophy (H) G23.9    6. Essential hypertension I10    7. Orthostatic hypotension dysautonomic syndrome (H) G90.3    8. Adjustment disorder with mixed anxiety and depressed mood  F43.23    9. Chronic bilateral low back pain with bilateral sciatica M54.42     M54.41     G89.29    10. Aneurysm of abdominal vessel (H) I71.4    11. S/P AAA repair using bifurcation graft Z95.828     Z86.79    12. Acquired hypothyroidism E03.9    13. Neuropathic pain of both legs G57.93        ORDERS:       --Patient is to take all scheduled medications on the day of surgery EXCEPT for modifications listed below.    APPROVAL GIVEN to proceed with proposed procedure, without further diagnostic evaluation       Surgery 12/12/18  1.  Shower with hibiclens on 12/11/18 pm and 12/12/18 am-please have assisted living facility staff assist  2. No breakfast on 12/12/18, OK for clear liquids until 9:00 am.  3.  NPO after 0900 am  4.  OK to take all am medications with clear liquids in the morning    Signed Electronically by: HAILEE Contreras CNP    Copy of this evaluation report is provided to requesting physician.    [place] Preop Guidelines    Revised Cardiac Risk Index          Sincerely,        HAILEE Contreras CNP

## 2018-12-04 NOTE — PROGRESS NOTES
PRE-OP EVALUATION:    [facility] Record Number:  [id number]  Place of Service where encounter took place:  [facility] ([facility] [[id number]]  Today's date: 2018    [facility]  [address] [facility] [address]  [facility] [address]  [phone]  Dept: [phone]    PRE-OP EVALUATION:  Today's date: 2018    Mehreen Camara (: 1940) presents for pre-operative evaluation assessment as requested by Dr. Sebastian.  She requires evaluation and anesthesia risk assessment prior to undergoing surgery/procedure for treatment of chronic cholecystitis .    Proposed Surgery/ Procedure: laparoscopic cholecystectomy  Date of Surgery/ Procedure: 18  Time of Surgery/ Procedure: 11:00 am  Hospital/Surgical Facility: [facility] Same [facility]  Available in [facility]  Primary Physician: Cathy Sargent  Type of Anesthesia Anticipated: to be determined    Patient has a Health Care Directive or Living Will:  no    1. YES - DO YOU HAVE A HISTORY OF HEART ATTACK, STROKE, STENT, BYPASS OR SURGERY ON AN ARTERY IN THE HEAD, NECK, HEART OR LEG? AAA repair with stent  2. NO - Do you ever have any pain or discomfort in your chest?  3. NO - Do you have a history of  Heart Failure?  4. NO - Are you troubled by shortness of breath when: walking on the level, up a slight hill or at night?  5. NO - Do you currently have a cold, bronchitis or other respiratory infection?  6. YES - DO YOU HAVE A COUGH, SHORTNESS OF BREATH OR WHEEZING? Occasional wheezing  7. NO - Do you sometimes get pains in the calves of your legs when you walk?  8. NO - Do you or anyone in your family have previous history of blood clots?  9. NO - Do you or does anyone in your family have a serious bleeding problem such as prolonged bleeding following surgeries or cuts?  10. NO - Have you ever had problems with anemia or been told to take iron pills?  11. NO - Have you had any abnormal blood loss such as black, tarry or bloody stools, or abnormal  Ultrasound Completed...tw   vaginal bleeding?  12. NO - Have you ever had a blood transfusion?  13. NO - Have you or any of your relatives ever had problems with anesthesia?  14. NO - Do you have sleep apnea, excessive snoring or daytime drowsiness?  15. NO - Do you have any prosthetic heart valves?  16. YES - DO YOU HAVE PROSTHETIC JOINTS? L knee and R hip  17. NO - Is there any chance that you may be pregnant?      HPI:     HPI related to upcoming procedure: patient with several month history of GI distress, nausea, vomiting, constipation, diarrhea, flatulence and abdominal pain found to have decreased gallbladder function consistent with chronic cholecystitis      DEPRESSION - Patient has a long history of Depression of moderate severity requiring medication for control with recent symptoms being stable..Current symptoms of depression include depressed mood, diminished interest or pleasure in activities, weight loss, decreased appetite, insomnia.                                                                                                                                                                                    .  HYPOTHYROIDISM - Patient has a longstanding history of chronic Hypothyroidism. Patient has been doing well, noting no tremor, insomnia, hair loss or changes in skin texture. Continues to take medications as directed, without adverse reactions or side effects. Last TSH No results found for: TSH.  Per Care Everywhere, TSH in 7/2018 was 3.83                                                                                                                                                                                                                     .    MEDICAL HISTORY:     Patient Active Problem List    Diagnosis Date Noted     Multiple system atrophy (H) 11/14/2018     Priority: Medium     Movement disorder 09/19/2018     Priority: Medium     Rheumatic aortic stenosis 09/19/2018     Priority: Medium     Disorder of  bursae and tendons in shoulder region 09/18/2018     Priority: Medium     Overview:   Created by Conversion    Replacement Utility updated for latest IMO load       Adjustment disorder with anxious mood 09/17/2018     Priority: Medium     Adjustment disorder with mixed anxiety and depressed mood 09/17/2018     Priority: Medium     Aneurysm of abdominal vessel (H) 09/17/2018     Priority: Medium     Overview:   Created by Conversion  [facility] Annotation: Jun 8 2011  8:07AM - Danni Ortiz: 4.4 on 11/2013.    Needs 6-12 month u/s or CT.    Replacement Utility updated for latest IMO load       Harris's esophagus 09/17/2018     Priority: Medium     Overview:   Created by Conversion  [facility] Annotation: Feb  3 2012  8:32AM - Cameron Obando: Needs EGD 2/2017       Bradycardia 09/17/2018     Priority: Medium     Cataract 09/17/2018     Priority: Medium     Overview:   Created by Conversion       Major depressive disorder, recurrent episode (H) 09/17/2018     Priority: Medium     Overview:   Created by Conversion    Replacement Utility updated for latest IMO load       Constipation 09/17/2018     Priority: Medium     Dizziness 09/17/2018     Priority: Medium     Overview:   Created by Conversion       Dyslipidemia 09/17/2018     Priority: Medium     Overview:   Created by Conversion       Esophageal reflux 09/17/2018     Priority: Medium     Overview:   Created by Conversion       Hypertension 09/17/2018     Priority: Medium     Overview:   Created by Conversion    Replacement Utility updated for latest IMO load       Hypothyroidism 09/17/2018     Priority: Medium     Overview:   Created by Conversion    Replacement Utility updated for latest IMO load       Insomnia due to anxiety and fear 09/17/2018     Priority: Medium     Lower back pain 09/17/2018     Priority: Medium     Osteoarthrosis 09/17/2018     Priority: Medium     Overview:   Created by Conversion    Replacement Utility updated for latest IMO load        Disorder of bone and cartilage 09/17/2018     Priority: Medium     Overview:   Created by [facility] Annotation: Dec 13 2012  7:41Roberta Kelly: vit D/Ca, f/u   Dexa needed 1/2014.    Replacement Utility updated for latest IMO load       Lower extremity weakness 07/04/2018     Priority: Medium     Orthostatic hypotension dysautonomic syndrome (H) 09/22/2017     Priority: Medium     Primary insomnia 07/04/2017     Priority: Medium     Urinary incontinence in female 05/07/2017     Priority: Medium     Weakness 05/04/2017     Priority: Medium     S/P AAA repair using bifurcation graft 11/30/2015     Priority: Medium     Adrenal adenoma 11/20/2015     Priority: Medium     Overview:   Current hormonal evaluation through endocrinology        Past Medical History:   Diagnosis Date     Adrenal adenoma     stable, thought not to be hormonally active     Arthritis      Depressive disorder      Hypertension      Rheumatic fever     thought to have had as a child     Small bowel obstruction (H)      Thyroid disease      Past Surgical History:   Procedure Laterality Date     AAA REPAIR  2015    [facility] bifurcation graft     CARPAL TUNNEL RELEASE RT/LT Bilateral 2013     HYSTERECTOMY  2013     JOINT REPLACEMENT Right 2003     ORTHOPEDIC SURGERY       SPINE SURGERY  1981    cervical spine fusion     THYROIDECTOMY  2011     Current Outpatient Prescriptions   Medication Sig Dispense Refill     acetaminophen (TYLENOL) 500 MG tablet Take 1 tablet (500 mg) by mouth 3 times daily 90 tablet 1     acetaminophen (TYLENOL) 500 MG tablet Take 1-2 tablets (500-1,000 mg) by mouth 3 times daily as needed for mild pain 60 tablet 3     bisacodyl (DULCOLAX) 10 MG Suppository Place 10 mg rectally daily as needed for constipation       calcium carbonate (TUMS) 500 MG chewable tablet Take 1 tablet (500 mg) by mouth every 2 hours as needed for heartburn 150 tablet 1     carbidopa-levodopa (SINEMET)  MG per tablet Take 2  tablets by mouth 3 times daily (Patient taking differently: Take 2 tablets by mouth 3 times daily 1/1/2 tablets 3 times a day) 180 tablet 1     cholecalciferol 2000 units tablet Take 1 tablet by mouth daily 30 tablet 3     citalopram (CELEXA) 20 MG tablet Take 1 tablet (20 mg) by mouth daily 90 tablet 1     diclofenac (VOLTAREN) 1 % GEL topical gel Place onto the skin 4 times daily QID prn to back and neck       gabapentin (NEURONTIN) 100 MG capsule Take 2 capsules (200 mg) by mouth At Bedtime (Patient taking differently: Take 200 mg by mouth 2 times daily ) 90 capsule 1     levothyroxine (SYNTHROID/LEVOTHROID) 137 MCG tablet Take 1 tablet (137 mcg) by mouth daily 90 tablet 3     multivitamin, therapeutic with minerals (MULTI-VITAMIN) TABS tablet Take 1 tablet by mouth daily 100 tablet 3     Nutritional Supplements (NUTRITIONAL SUPPLEMENT PO) Take by mouth daily       omeprazole (PRILOSEC) 20 MG CR capsule Take 1 capsule (20 mg) by mouth 2 times daily 90 capsule 3     polyethylene glycol (MIRALAX) powder Take 17 g by mouth daily 510 g 1     polyethylene glycol 0.4%- propylene glycol 0.3% (SYSTANE ULTRA) 0.4-0.3 % SOLN ophthalmic solution Place 1 drop into both eyes 4 times daily as needed for dry eyes       ranitidine (ZANTAC) 150 MG capsule Take 1 capsule (150 mg) by mouth At Bedtime 180 capsule 3     senna (SENOKOT) 8.6 MG tablet Take 1 tablet by mouth daily as needed for constipation 120 tablet 1     triamcinolone (KENALOG) 0.1 % cream Apply topically 2 times daily as needed for irritation BID prn to feet       Zinc Oxide (DESITIN) 13 % CREA Externally apply topically 2 times daily       OTC products: tylenol     Allergies   Allergen Reactions     Penicillins Anaphylaxis, Rash and Shortness Of Breath     Aspirin Nausea     Atenolol Cough     Atorvastatin Muscle Pain (Myalgia)     Bupropion Nausea     Clindamycin Other (See Comments)     Constipation      Codeine Nausea     Ibuprofen Nausea     Stomach upset      Lovastatin Muscle Pain (Myalgia)     Cephalexin Rash     Neck reddness      Latex Allergy: NO    Social History   Substance Use Topics     Smoking status: Former Smoker     Packs/day: 0.50     Types: Cigarettes     Quit date: 12/11/1994     Smokeless tobacco: Never Used     Alcohol use No     History   Drug Use No       REVIEW OF SYSTEMS:   4 point ROS including Respiratory, CV, GI and , other than that noted in the HPI,  is negative    EXAM:   /76  Pulse 72  Temp 98.8  F (37.1  C)  Resp 20  Wt 145 lb (65.8 kg)  SpO2 99%  BMI 23.4 kg/m2   BP Readings from Last 6 Encounters:   12/04/18 134/76   11/26/18 134/86   11/20/18 149/87   11/13/18 149/87   11/06/18 127/82   10/23/18 121/77      GENERAL APPEARANCE:  Alert, thin, anxious  RESP:  respiratory effort and palpation of chest normal, lungs clear to auscultation , no respiratory distress  CV:  Palpation and auscultation of heart done , regular rate and rhythm, no murmur, rub, or gallop, no edema  ABDOMEN:  normal bowel sounds, soft, nontender, no hepatosplenomegaly or other masses  M/S:   Gait and station normal    DIAGNOSTICS:   EKG: Not indicated due to non-vascular surgery and last ekg on 7/4/18 (within 30 days for CAD history or last year for cardiac risk factors)    ECHO:  9/18/18 per Care Everywhere     .  Transferred Records on 11/08/2018   Component Date Value Ref Range Status     Sodium 11/08/2018 139  136 - 145 mmol/L Final     Potassium 11/08/2018 4.1  3.5 - 5.0 mmol/L Final     Chloride 11/08/2018 99  98 - 107 mmol/L Final     Carbon Dioxide 11/08/2018 32* 22 - 31 mmol/L Final     Anion Gap 11/08/2018 8  5 - 18 mmol/L Final     Glucose 11/08/2018 97  70 - 125 mg/dL Final     Urea Nitrogen 11/08/2018 15  6 - 26 mg/dL Final     Creatinine 11/08/2018 0.69  0.60 - 1.10 mg/dL Final     Calcium 11/08/2018 9.4  8.5 - 10.5 mg/dL Final     Protein Total 11/08/2018 7.2  6.0 - 8.0 g/dL Final     Albumin 11/08/2018 3.7  3.5 - 5.0 g/dL Final     Bilirubin  Total 11/08/2018 0.5  0.0 - 1.0 mg/dL Final     Alkaline Phosphatase 11/08/2018 63  45 - 120 U/L Final     AST 11/08/2018 17  0 - 40 U/L Final     ALT 11/08/2018 <9  0 - 45 U/L Final     GFR Estimate 11/08/2018 >60  >60 ml/min/1.73m2 Final     GFR Estimate If Black 11/08/2018 >60  >60 ml/min/1.73m2 Final     WBC 11/08/2018 5.8  4.0 - 11.0 thou/uL Final     RBC Count 11/08/2018 4.48  3.80 - 5.40 mill/uL Final     Hemoglobin 11/08/2018 13.7  12.0 - 16.0 g/dL Final     Hematocrit 11/08/2018 43.2  35.0 - 47.0 % Final     MCV 11/08/2018 96  80 - 100 fL Final     MCH 11/08/2018 30.6  27.0 - 34.0 pg Final     MCHC 11/08/2018 31.7* 32.0 - 36.0 g/dL Final     RDW 11/08/2018 11.9  11.0 - 14.5 % Final     Platelet Count 11/08/2018 217  140 - 440 thou/uL Final     Differential 11/08/2018 See Scanned Document   Final          Recent Labs   Lab Test 11/08/18   HGB  13.7   PLT  217   NA  139   POTASSIUM  4.1   CR  0.69        IMPRESSION:   Reason for surgery/procedure: nausea, vomiting, abdominal pain  Diagnosis/reason for consult: chronic cholecystits    The proposed surgical procedure is considered INTERMEDIATE risk.    REVISED CARDIAC RISK INDEX  The patient has the following serious cardiovascular risks for perioperative complications such as (MI, PE, VFib and 3  AV Block):  No serious cardiac risks  INTERPRETATION: 0 risks: Class I (very low risk - 0.4% complication rate)    The patient has the following additional risks for perioperative complications:  No identified additional risks      ICD-10-CM    1. Chronic cholecystitis K81.1    2. Abdominal pain, generalized R10.84    3. Nausea R11.0    4. Weight loss R63.4    5. Multiple system atrophy (H) G23.9    6. Essential hypertension I10    7. Orthostatic hypotension dysautonomic syndrome (H) G90.3    8. Adjustment disorder with mixed anxiety and depressed mood F43.23    9. Chronic bilateral low back pain with bilateral sciatica M54.42     M54.41     G89.29    10. Aneurysm of  abdominal vessel (H) I71.4    11. S/P AAA repair using bifurcation graft Z95.828     Z86.79    12. Acquired hypothyroidism E03.9    13. Neuropathic pain of both legs G57.93        ORDERS:       --Patient is to take all scheduled medications on the day of surgery EXCEPT for modifications listed below.    APPROVAL GIVEN to proceed with proposed procedure, without further diagnostic evaluation       Surgery 12/12/18  1.  Shower with hibiclens on 12/11/18 pm and 12/12/18 am-please have assisted living facility staff assist  2. No breakfast on 12/12/18, OK for clear liquids until 9:00 am.  3.  NPO after 0900 am  4.  OK to take all am medications with clear liquids in the morning    Signed Electronically by: HAILEE Contreras CNP    Copy of this evaluation report is provided to requesting physician.    [facility] Preop Guidelines    Revised Cardiac Risk Index

## 2018-12-10 ENCOUNTER — ANESTHESIA EVENT (OUTPATIENT)
Dept: SURGERY | Facility: CLINIC | Age: 78
End: 2018-12-10
Payer: MEDICARE

## 2018-12-11 NOTE — ANESTHESIA PREPROCEDURE EVALUATION
Anesthesia Pre-Procedure Evaluation    Patient: Mehreen Camara   MRN: [id number] : 1940          Preoperative Diagnosis:  Biliary dyskinesia     Procedure(s):  LAPAROSCOPIC CHOLECYSTECTOMY    Past Medical History:   Diagnosis Date     Adrenal adenoma     stable, thought not to be hormonally active     Arthritis      Depressive disorder      Hypertension      Rheumatic fever     thought to have had as a child     Small bowel obstruction (H)      Thyroid disease      Past Surgical History:   Procedure Laterality Date     AAA REPAIR      [facility] bifurcation graft     CARPAL TUNNEL RELEASE RT/LT Bilateral 2013     HYSTERECTOMY  2013     JOINT REPLACEMENT Right      ORTHOPEDIC SURGERY       SPINE SURGERY  1981    cervical spine fusion     THYROIDECTOMY  2011       Anesthesia Evaluation     . Pt has had prior anesthetic. Type: General, MAC and Regional    No history of anesthetic complications          ROS/MED HX    ENT/Pulmonary:     (+)tobacco use, Past use , . .    Neurologic: Comment: Pt states she has also had bilat sciatica pain starting after her AAA repair    (+)neuropathy - bilat feet, other neuro weakness bilat legs- taking sinemet 3x/day    Cardiovascular:     (+) Dyslipidemia, hypertension (hasn't been taking for over 2 mo)--- Other, --. : . . . :. dysrhythmias Irregular Heartbeat/Palpitations, valvular problems/murmurs type: AS . Previous cardiac testing      : s/p AAA repair using bifurcation graft.   METS/Exercise Tolerance:     Hematologic:         Musculoskeletal: Comment: Fell approx a week ago and landed on tailbone. Pt states this is extremely sore.   (+) arthritis, , , -       GI/Hepatic:     (+) GERD Asymptomatic on medication,       Renal/Genitourinary:  - ROS Renal section negative       Endo:     (+) thyroid problem hypothyroidism, Other Endocrine Disorder history of adrenal adenoma.      Psychiatric:     (+) psychiatric history anxiety and depression      Infectious Disease:  -  "neg infectious disease ROS       Malignancy:      - no malignancy   Other:    (+) H/O Chronic Pain,                        Physical Exam  Normal systems: cardiovascular and pulmonary    Airway   Mallampati: II  TM distance: >3 FB  Neck ROM: full    Dental   (+) upper dentures    Cardiovascular       Pulmonary             Lab Results   Component Value Date    WBC 5.8 11/08/2018    HGB 13.7 11/08/2018    HCT 43.2 11/08/2018     11/08/2018     11/08/2018    POTASSIUM 4.1 11/08/2018    CHLORIDE 99 11/08/2018    CO2 32 (H) 11/08/2018    BUN 15 11/08/2018    CR 0.69 11/08/2018    GLC 97 11/08/2018    YADIRA 9.4 11/08/2018    ALBUMIN 3.7 11/08/2018    PROTTOTAL 7.2 11/08/2018    ALT <9 11/08/2018    AST 17 11/08/2018    ALKPHOS 63 11/08/2018    BILITOTAL 0.5 11/08/2018       Preop Vitals  BP Readings from Last 3 Encounters:   12/04/18 134/76   11/26/18 134/86   11/20/18 149/87    Pulse Readings from Last 3 Encounters:   12/04/18 72   11/26/18 75   11/20/18 72      Resp Readings from Last 3 Encounters:   12/04/18 20   11/20/18 18   11/13/18 18    SpO2 Readings from Last 3 Encounters:   12/04/18 99%   11/13/18 95%   11/06/18 96%      Temp Readings from Last 1 Encounters:   12/04/18 37.1  C (98.8  F)    Ht Readings from Last 1 Encounters:   11/26/18 1.676 m (5' 6\")      Wt Readings from Last 1 Encounters:   12/04/18 65.8 kg (145 lb)    Estimated body mass index is 23.4 kg/m  as calculated from the following:    Height as of 11/26/18: 1.676 m (5' 6\").    Weight as of 12/4/18: 65.8 kg (145 lb).       Anesthesia Plan      History & Physical Review  History and physical reviewed and following examination; no interval change.    ASA Status:  3 .    NPO Status:  > 6 hours    Plan for General and ETT with Intravenous induction. Maintenance will be Balanced.    PONV prophylaxis:  Ondansetron (or other 5HT-3) and Dexamethasone or Solumedrol  Additional equipment: Videolaryngoscope      Postoperative Care  Postoperative pain " management:  IV analgesics.      Consents  Anesthetic plan, risks, benefits and alternatives discussed with:  Patient..                 HAILEE Mak CRNA

## 2018-12-12 ENCOUNTER — ANESTHESIA (OUTPATIENT)
Dept: SURGERY | Facility: CLINIC | Age: 78
End: 2018-12-12
Payer: MEDICARE

## 2018-12-12 ENCOUNTER — HOSPITAL ENCOUNTER (OUTPATIENT)
Facility: CLINIC | Age: 78
Setting detail: OBSERVATION
LOS: 1 days | Discharge: HOME HEALTH | End: 2018-12-13
Attending: SURGERY | Admitting: SURGERY
Payer: MEDICARE

## 2018-12-12 DIAGNOSIS — G89.18 POST-OP PAIN: Primary | ICD-10-CM

## 2018-12-12 PROBLEM — Z90.49 S/P LAPAROSCOPIC CHOLECYSTECTOMY: Status: ACTIVE | Noted: 2018-12-12

## 2018-12-12 PROCEDURE — 36000058 ZZH SURGERY LEVEL 3 EA 15 ADDTL MIN: Performed by: SURGERY

## 2018-12-12 PROCEDURE — 27210794 ZZH OR GENERAL SUPPLY STERILE: Performed by: SURGERY

## 2018-12-12 PROCEDURE — 47562 LAPAROSCOPIC CHOLECYSTECTOMY: CPT | Mod: 22 | Performed by: SURGERY

## 2018-12-12 PROCEDURE — 25000128 H RX IP 250 OP 636: Performed by: SURGERY

## 2018-12-12 PROCEDURE — 88304 TISSUE EXAM BY PATHOLOGIST: CPT | Mod: 26 | Performed by: SURGERY

## 2018-12-12 PROCEDURE — A9270 NON-COVERED ITEM OR SERVICE: HCPCS | Mod: GY | Performed by: NURSE ANESTHETIST, CERTIFIED REGISTERED

## 2018-12-12 PROCEDURE — 37000008 ZZH ANESTHESIA TECHNICAL FEE, 1ST 30 MIN: Performed by: SURGERY

## 2018-12-12 PROCEDURE — 25000125 ZZHC RX 250: Performed by: NURSE ANESTHETIST, CERTIFIED REGISTERED

## 2018-12-12 PROCEDURE — 71000013 ZZH RECOVERY PHASE 1 LEVEL 1 EA ADDTL HR: Performed by: SURGERY

## 2018-12-12 PROCEDURE — 25000131 ZZH RX MED GY IP 250 OP 636 PS 637: Mod: GY | Performed by: SURGERY

## 2018-12-12 PROCEDURE — 37000009 ZZH ANESTHESIA TECHNICAL FEE, EACH ADDTL 15 MIN: Performed by: SURGERY

## 2018-12-12 PROCEDURE — 40000306 ZZH STATISTIC PRE PROC ASSESS II: Performed by: SURGERY

## 2018-12-12 PROCEDURE — 71000027 ZZH RECOVERY PHASE 2 EACH 15 MINS: Performed by: SURGERY

## 2018-12-12 PROCEDURE — 25000132 ZZH RX MED GY IP 250 OP 250 PS 637: Mod: GY | Performed by: NURSE ANESTHETIST, CERTIFIED REGISTERED

## 2018-12-12 PROCEDURE — A9270 NON-COVERED ITEM OR SERVICE: HCPCS | Mod: GY | Performed by: SURGERY

## 2018-12-12 PROCEDURE — 25000125 ZZHC RX 250: Performed by: SURGERY

## 2018-12-12 PROCEDURE — 25000132 ZZH RX MED GY IP 250 OP 250 PS 637: Mod: GY | Performed by: SURGERY

## 2018-12-12 PROCEDURE — 25000128 H RX IP 250 OP 636: Performed by: NURSE ANESTHETIST, CERTIFIED REGISTERED

## 2018-12-12 PROCEDURE — 88304 TISSUE EXAM BY PATHOLOGIST: CPT | Performed by: SURGERY

## 2018-12-12 PROCEDURE — 47562 LAPAROSCOPIC CHOLECYSTECTOMY: CPT | Mod: 22 | Performed by: PHYSICIAN ASSISTANT

## 2018-12-12 PROCEDURE — G0378 HOSPITAL OBSERVATION PER HR: HCPCS

## 2018-12-12 PROCEDURE — 36000056 ZZH SURGERY LEVEL 3 1ST 30 MIN: Performed by: SURGERY

## 2018-12-12 PROCEDURE — 71000012 ZZH RECOVERY PHASE 1 LEVEL 1 FIRST HR: Performed by: SURGERY

## 2018-12-12 PROCEDURE — 96374 THER/PROPH/DIAG INJ IV PUSH: CPT

## 2018-12-12 RX ORDER — FENTANYL CITRATE 50 UG/ML
25-50 INJECTION, SOLUTION INTRAMUSCULAR; INTRAVENOUS
Status: DISCONTINUED | OUTPATIENT
Start: 2018-12-12 | End: 2018-12-12 | Stop reason: HOSPADM

## 2018-12-12 RX ORDER — HYDROCODONE BITARTRATE AND ACETAMINOPHEN 5; 325 MG/1; MG/1
1-2 TABLET ORAL EVERY 4 HOURS PRN
Status: DISCONTINUED | OUTPATIENT
Start: 2018-12-12 | End: 2018-12-13 | Stop reason: HOSPADM

## 2018-12-12 RX ORDER — GABAPENTIN 100 MG/1
100 CAPSULE ORAL 2 TIMES DAILY
Status: DISCONTINUED | OUTPATIENT
Start: 2018-12-13 | End: 2018-12-13 | Stop reason: HOSPADM

## 2018-12-12 RX ORDER — SODIUM CHLORIDE, SODIUM LACTATE, POTASSIUM CHLORIDE, CALCIUM CHLORIDE 600; 310; 30; 20 MG/100ML; MG/100ML; MG/100ML; MG/100ML
INJECTION, SOLUTION INTRAVENOUS CONTINUOUS
Status: DISCONTINUED | OUTPATIENT
Start: 2018-12-12 | End: 2018-12-13 | Stop reason: HOSPADM

## 2018-12-12 RX ORDER — ONDANSETRON 4 MG/1
4 TABLET, ORALLY DISINTEGRATING ORAL EVERY 6 HOURS PRN
Status: DISCONTINUED | OUTPATIENT
Start: 2018-12-12 | End: 2018-12-13 | Stop reason: HOSPADM

## 2018-12-12 RX ORDER — NALOXONE HYDROCHLORIDE 0.4 MG/ML
.1-.4 INJECTION, SOLUTION INTRAMUSCULAR; INTRAVENOUS; SUBCUTANEOUS
Status: DISCONTINUED | OUTPATIENT
Start: 2018-12-12 | End: 2018-12-13 | Stop reason: HOSPADM

## 2018-12-12 RX ORDER — ONDANSETRON 2 MG/ML
INJECTION INTRAMUSCULAR; INTRAVENOUS PRN
Status: DISCONTINUED | OUTPATIENT
Start: 2018-12-12 | End: 2018-12-12

## 2018-12-12 RX ORDER — GABAPENTIN 100 MG/1
200 CAPSULE ORAL AT BEDTIME
Status: DISCONTINUED | OUTPATIENT
Start: 2018-12-12 | End: 2018-12-13 | Stop reason: HOSPADM

## 2018-12-12 RX ORDER — GABAPENTIN 100 MG/1
200 CAPSULE ORAL 2 TIMES DAILY
Status: DISCONTINUED | OUTPATIENT
Start: 2018-12-12 | End: 2018-12-12

## 2018-12-12 RX ORDER — MEPERIDINE HYDROCHLORIDE 25 MG/ML
12.5 INJECTION INTRAMUSCULAR; INTRAVENOUS; SUBCUTANEOUS
Status: DISCONTINUED | OUTPATIENT
Start: 2018-12-12 | End: 2018-12-13 | Stop reason: HOSPADM

## 2018-12-12 RX ORDER — SODIUM CHLORIDE, SODIUM LACTATE, POTASSIUM CHLORIDE, CALCIUM CHLORIDE 600; 310; 30; 20 MG/100ML; MG/100ML; MG/100ML; MG/100ML
INJECTION, SOLUTION INTRAVENOUS CONTINUOUS
Status: DISCONTINUED | OUTPATIENT
Start: 2018-12-12 | End: 2018-12-12 | Stop reason: HOSPADM

## 2018-12-12 RX ORDER — ALBUTEROL SULFATE 0.83 MG/ML
2.5 SOLUTION RESPIRATORY (INHALATION) EVERY 4 HOURS PRN
Status: DISCONTINUED | OUTPATIENT
Start: 2018-12-12 | End: 2018-12-12 | Stop reason: HOSPADM

## 2018-12-12 RX ORDER — LIDOCAINE HYDROCHLORIDE 10 MG/ML
INJECTION, SOLUTION INFILTRATION; PERINEURAL PRN
Status: DISCONTINUED | OUTPATIENT
Start: 2018-12-12 | End: 2018-12-12

## 2018-12-12 RX ORDER — CARBIDOPA AND LEVODOPA 25; 100 MG/1; MG/1
2 TABLET ORAL 3 TIMES DAILY
Status: DISCONTINUED | OUTPATIENT
Start: 2018-12-12 | End: 2018-12-12 | Stop reason: DRUGHIGH

## 2018-12-12 RX ORDER — HYDROMORPHONE HYDROCHLORIDE 1 MG/ML
.3-.5 INJECTION, SOLUTION INTRAMUSCULAR; INTRAVENOUS; SUBCUTANEOUS EVERY 10 MIN PRN
Status: DISCONTINUED | OUTPATIENT
Start: 2018-12-12 | End: 2018-12-12 | Stop reason: DRUGHIGH

## 2018-12-12 RX ORDER — ONDANSETRON 4 MG/1
4 TABLET, ORALLY DISINTEGRATING ORAL EVERY 30 MIN PRN
Status: DISCONTINUED | OUTPATIENT
Start: 2018-12-12 | End: 2018-12-12 | Stop reason: DRUGHIGH

## 2018-12-12 RX ORDER — HYDROMORPHONE HCL/0.9% NACL/PF 0.2MG/0.2
0.2 SYRINGE (ML) INTRAVENOUS
Status: DISCONTINUED | OUTPATIENT
Start: 2018-12-12 | End: 2018-12-13 | Stop reason: HOSPADM

## 2018-12-12 RX ORDER — HYDROCODONE BITARTRATE AND ACETAMINOPHEN 5; 325 MG/1; MG/1
1 TABLET ORAL EVERY 4 HOURS PRN
Status: DISCONTINUED | OUTPATIENT
Start: 2018-12-12 | End: 2018-12-12 | Stop reason: HOSPADM

## 2018-12-12 RX ORDER — ONDANSETRON 2 MG/ML
4 INJECTION INTRAMUSCULAR; INTRAVENOUS EVERY 30 MIN PRN
Status: DISCONTINUED | OUTPATIENT
Start: 2018-12-12 | End: 2018-12-12 | Stop reason: DRUGHIGH

## 2018-12-12 RX ORDER — NEOSTIGMINE METHYLSULFATE 1 MG/ML
VIAL (ML) INJECTION PRN
Status: DISCONTINUED | OUTPATIENT
Start: 2018-12-12 | End: 2018-12-12

## 2018-12-12 RX ORDER — FENTANYL CITRATE 50 UG/ML
INJECTION, SOLUTION INTRAMUSCULAR; INTRAVENOUS PRN
Status: DISCONTINUED | OUTPATIENT
Start: 2018-12-12 | End: 2018-12-12

## 2018-12-12 RX ORDER — CELECOXIB 200 MG/1
200 CAPSULE ORAL ONCE
Status: COMPLETED | OUTPATIENT
Start: 2018-12-12 | End: 2018-12-12

## 2018-12-12 RX ORDER — ONDANSETRON 2 MG/ML
4 INJECTION INTRAMUSCULAR; INTRAVENOUS EVERY 6 HOURS PRN
Status: DISCONTINUED | OUTPATIENT
Start: 2018-12-12 | End: 2018-12-13 | Stop reason: HOSPADM

## 2018-12-12 RX ORDER — CITALOPRAM HYDROBROMIDE 20 MG/1
20 TABLET ORAL DAILY
Status: DISCONTINUED | OUTPATIENT
Start: 2018-12-12 | End: 2018-12-13 | Stop reason: HOSPADM

## 2018-12-12 RX ORDER — ACETAMINOPHEN 325 MG/1
975 TABLET ORAL ONCE
Status: DISCONTINUED | OUTPATIENT
Start: 2018-12-12 | End: 2018-12-12 | Stop reason: HOSPADM

## 2018-12-12 RX ORDER — GABAPENTIN 100 MG/1
100 CAPSULE ORAL 2 TIMES DAILY
COMMUNITY
End: 2019-01-22

## 2018-12-12 RX ORDER — ACETAMINOPHEN 325 MG/1
650 TABLET ORAL EVERY 4 HOURS PRN
Status: DISCONTINUED | OUTPATIENT
Start: 2018-12-12 | End: 2018-12-13 | Stop reason: HOSPADM

## 2018-12-12 RX ORDER — BUPIVACAINE HYDROCHLORIDE AND EPINEPHRINE 5; 5 MG/ML; UG/ML
INJECTION, SOLUTION PERINEURAL PRN
Status: DISCONTINUED | OUTPATIENT
Start: 2018-12-12 | End: 2018-12-12 | Stop reason: HOSPADM

## 2018-12-12 RX ORDER — NALOXONE HYDROCHLORIDE 0.4 MG/ML
.1-.4 INJECTION, SOLUTION INTRAMUSCULAR; INTRAVENOUS; SUBCUTANEOUS
Status: DISCONTINUED | OUTPATIENT
Start: 2018-12-12 | End: 2018-12-12

## 2018-12-12 RX ORDER — KETOROLAC TROMETHAMINE 15 MG/ML
15 INJECTION, SOLUTION INTRAMUSCULAR; INTRAVENOUS EVERY 6 HOURS
Status: DISCONTINUED | OUTPATIENT
Start: 2018-12-12 | End: 2018-12-13 | Stop reason: HOSPADM

## 2018-12-12 RX ORDER — LIDOCAINE 40 MG/G
CREAM TOPICAL
Status: DISCONTINUED | OUTPATIENT
Start: 2018-12-12 | End: 2018-12-12 | Stop reason: HOSPADM

## 2018-12-12 RX ORDER — HYDROCODONE BITARTRATE AND ACETAMINOPHEN 5; 325 MG/1; MG/1
2 TABLET ORAL EVERY 6 HOURS PRN
Status: DISCONTINUED | OUTPATIENT
Start: 2018-12-12 | End: 2018-12-12 | Stop reason: HOSPADM

## 2018-12-12 RX ORDER — INDOCYANINE GREEN AND WATER 25 MG
2.5 KIT INJECTION ONCE
Status: COMPLETED | OUTPATIENT
Start: 2018-12-12 | End: 2018-12-12

## 2018-12-12 RX ORDER — GLYCOPYRROLATE 0.2 MG/ML
INJECTION, SOLUTION INTRAMUSCULAR; INTRAVENOUS PRN
Status: DISCONTINUED | OUTPATIENT
Start: 2018-12-12 | End: 2018-12-12

## 2018-12-12 RX ORDER — EPHEDRINE SULFATE 50 MG/ML
INJECTION, SOLUTION INTRAVENOUS PRN
Status: DISCONTINUED | OUTPATIENT
Start: 2018-12-12 | End: 2018-12-12

## 2018-12-12 RX ORDER — PROPOFOL 10 MG/ML
INJECTION, EMULSION INTRAVENOUS PRN
Status: DISCONTINUED | OUTPATIENT
Start: 2018-12-12 | End: 2018-12-12

## 2018-12-12 RX ORDER — CALCIUM CARBONATE 500 MG/1
500 TABLET, CHEWABLE ORAL 3 TIMES DAILY PRN
Status: DISCONTINUED | OUTPATIENT
Start: 2018-12-12 | End: 2018-12-13 | Stop reason: HOSPADM

## 2018-12-12 RX ORDER — DEXAMETHASONE SODIUM PHOSPHATE 4 MG/ML
INJECTION, SOLUTION INTRA-ARTICULAR; INTRALESIONAL; INTRAMUSCULAR; INTRAVENOUS; SOFT TISSUE PRN
Status: DISCONTINUED | OUTPATIENT
Start: 2018-12-12 | End: 2018-12-12

## 2018-12-12 RX ORDER — ACETAMINOPHEN 650 MG/1
650 SUPPOSITORY RECTAL EVERY 4 HOURS PRN
Status: DISCONTINUED | OUTPATIENT
Start: 2018-12-12 | End: 2018-12-13 | Stop reason: HOSPADM

## 2018-12-12 RX ORDER — HYDROCODONE BITARTRATE AND ACETAMINOPHEN 5; 325 MG/1; MG/1
1 TABLET ORAL EVERY 4 HOURS PRN
Qty: 10 TABLET | Refills: 0 | Status: SHIPPED | OUTPATIENT
Start: 2018-12-12 | End: 2019-03-02

## 2018-12-12 RX ORDER — LEVOFLOXACIN 5 MG/ML
500 INJECTION, SOLUTION INTRAVENOUS EVERY 24 HOURS
Status: COMPLETED | OUTPATIENT
Start: 2018-12-12 | End: 2018-12-12

## 2018-12-12 RX ADMIN — MEPERIDINE HYDROCHLORIDE 12.5 MG: 25 INJECTION INTRAMUSCULAR; INTRAVENOUS; SUBCUTANEOUS at 13:14

## 2018-12-12 RX ADMIN — Medication 5 MG: at 12:58

## 2018-12-12 RX ADMIN — GLYCOPYRROLATE 0.2 MG: 0.2 INJECTION, SOLUTION INTRAMUSCULAR; INTRAVENOUS at 11:48

## 2018-12-12 RX ADMIN — ONDANSETRON 4 MG: 2 INJECTION INTRAMUSCULAR; INTRAVENOUS at 12:32

## 2018-12-12 RX ADMIN — EPHEDRINE SULFATE 10 MG: 50 INJECTION, SOLUTION INTRAVENOUS at 11:51

## 2018-12-12 RX ADMIN — SODIUM CHLORIDE, POTASSIUM CHLORIDE, SODIUM LACTATE AND CALCIUM CHLORIDE 1000 ML: 600; 310; 30; 20 INJECTION, SOLUTION INTRAVENOUS at 10:47

## 2018-12-12 RX ADMIN — HYDROMORPHONE HYDROCHLORIDE 0.2 MG: 1 INJECTION, SOLUTION INTRAMUSCULAR; INTRAVENOUS; SUBCUTANEOUS at 18:13

## 2018-12-12 RX ADMIN — FENTANYL CITRATE 100 MCG: 50 INJECTION, SOLUTION INTRAMUSCULAR; INTRAVENOUS at 12:11

## 2018-12-12 RX ADMIN — MIDAZOLAM 1 MG: 1 INJECTION INTRAMUSCULAR; INTRAVENOUS at 11:33

## 2018-12-12 RX ADMIN — PROPOFOL 100 MG: 10 INJECTION, EMULSION INTRAVENOUS at 11:41

## 2018-12-12 RX ADMIN — FENTANYL CITRATE 150 MCG: 50 INJECTION, SOLUTION INTRAMUSCULAR; INTRAVENOUS at 12:01

## 2018-12-12 RX ADMIN — CELECOXIB 200 MG: 200 CAPSULE ORAL at 11:03

## 2018-12-12 RX ADMIN — HYDROCODONE BITARTRATE AND ACETAMINOPHEN 2 TABLET: 5; 325 TABLET ORAL at 19:46

## 2018-12-12 RX ADMIN — MIDAZOLAM 1 MG: 1 INJECTION INTRAMUSCULAR; INTRAVENOUS at 12:02

## 2018-12-12 RX ADMIN — FENTANYL CITRATE 50 MCG: 50 INJECTION, SOLUTION INTRAMUSCULAR; INTRAVENOUS at 11:41

## 2018-12-12 RX ADMIN — KETOROLAC TROMETHAMINE 15 MG: 15 INJECTION, SOLUTION INTRAMUSCULAR; INTRAVENOUS at 23:02

## 2018-12-12 RX ADMIN — LEVOFLOXACIN 500 MG: 5 INJECTION, SOLUTION INTRAVENOUS at 11:33

## 2018-12-12 RX ADMIN — DEXAMETHASONE SODIUM PHOSPHATE 4 MG: 4 INJECTION, SOLUTION INTRA-ARTICULAR; INTRALESIONAL; INTRAMUSCULAR; INTRAVENOUS; SOFT TISSUE at 11:41

## 2018-12-12 RX ADMIN — CARBIDOPA AND LEVODOPA: 25; 100 TABLET ORAL at 19:57

## 2018-12-12 RX ADMIN — HYDROCODONE BITARTRATE AND ACETAMINOPHEN 2 TABLET: 5; 325 TABLET ORAL at 14:13

## 2018-12-12 RX ADMIN — CITALOPRAM HYDROBROMIDE 20 MG: 20 TABLET ORAL at 18:19

## 2018-12-12 RX ADMIN — HYDROMORPHONE HYDROCHLORIDE 0.5 MG: 1 INJECTION, SOLUTION INTRAMUSCULAR; INTRAVENOUS; SUBCUTANEOUS at 13:55

## 2018-12-12 RX ADMIN — RANITIDINE 150 MG: 150 TABLET ORAL at 19:50

## 2018-12-12 RX ADMIN — OMEPRAZOLE 20 MG: 20 CAPSULE, DELAYED RELEASE ORAL at 19:50

## 2018-12-12 RX ADMIN — INDOCYANINE GREEN 2.5 MG: 25 INJECTION, POWDER, LYOPHILIZED, FOR SOLUTION INTRAVENOUS at 10:48

## 2018-12-12 RX ADMIN — ROCURONIUM BROMIDE 30 MG: 10 INJECTION INTRAVENOUS at 11:41

## 2018-12-12 RX ADMIN — HYDROMORPHONE HYDROCHLORIDE 0.5 MG: 1 INJECTION, SOLUTION INTRAMUSCULAR; INTRAVENOUS; SUBCUTANEOUS at 13:36

## 2018-12-12 RX ADMIN — FENTANYL CITRATE 50 MCG: 50 INJECTION, SOLUTION INTRAMUSCULAR; INTRAVENOUS at 11:35

## 2018-12-12 RX ADMIN — GABAPENTIN 200 MG: 100 CAPSULE ORAL at 21:13

## 2018-12-12 RX ADMIN — PROPOFOL 50 MG: 10 INJECTION, EMULSION INTRAVENOUS at 12:10

## 2018-12-12 RX ADMIN — LIDOCAINE HYDROCHLORIDE 50 MG: 10 INJECTION, SOLUTION INFILTRATION; PERINEURAL at 11:41

## 2018-12-12 RX ADMIN — SODIUM CHLORIDE, POTASSIUM CHLORIDE, SODIUM LACTATE AND CALCIUM CHLORIDE: 600; 310; 30; 20 INJECTION, SOLUTION INTRAVENOUS at 14:16

## 2018-12-12 RX ADMIN — Medication 1 ML: at 10:47

## 2018-12-12 RX ADMIN — GLYCOPYRROLATE 1 MG: 0.2 INJECTION, SOLUTION INTRAMUSCULAR; INTRAVENOUS at 12:58

## 2018-12-12 RX ADMIN — DEXTROSE AND SODIUM CHLORIDE: 5; 900 INJECTION, SOLUTION INTRAVENOUS at 18:15

## 2018-12-12 RX ADMIN — ONDANSETRON 4 MG: 4 TABLET, ORALLY DISINTEGRATING ORAL at 19:57

## 2018-12-12 RX ADMIN — PROPOFOL 50 MG: 10 INJECTION, EMULSION INTRAVENOUS at 12:01

## 2018-12-12 RX ADMIN — HYDROMORPHONE HYDROCHLORIDE 0.2 MG: 1 INJECTION, SOLUTION INTRAMUSCULAR; INTRAVENOUS; SUBCUTANEOUS at 21:14

## 2018-12-12 ASSESSMENT — MIFFLIN-ST. JEOR
SCORE: 1131.79
SCORE: 1171.75

## 2018-12-12 ASSESSMENT — ENCOUNTER SYMPTOMS: DYSRHYTHMIAS: 1

## 2018-12-12 ASSESSMENT — LIFESTYLE VARIABLES: TOBACCO_USE: 1

## 2018-12-12 NOTE — BRIEF OP NOTE
[facility]    Brief Operative Note    Pre-operative diagnosis:  Biliary dyskinesia   Post-operative diagnosis biliary dyskinesia, abdominal adhesions  Procedure: Procedure(s):  LAPAROSCOPIC CHOLECYSTECTOMY  Surgeon: Surgeon(s) and Role:     * Amadeo Sebastian MD - Primary     * Roscoe Martinez PA-C - Assisting  Anesthesia: General   Estimated blood loss: Minimal  Drains: None  Specimens: * No specimens in log *  Findings:   multiple adhesions taken down, anatomy clear.  Complications: None.  Implants: None.

## 2018-12-12 NOTE — ANESTHESIA POSTPROCEDURE EVALUATION
Patient: Mehreen Morrisonbarshadia    Procedure(s):  LAPAROSCOPIC CHOLECYSTECTOMY    Diagnosis: Biliary dyskinesia   Diagnosis Additional Information: No value filed.    Anesthesia Type:  General, ETT    Note:  Anesthesia Post Evaluation    Patient location during evaluation: PACU  Patient participation: Able to fully participate in evaluation  Level of consciousness: awake and alert  Pain management: adequate  Airway patency: patent  Cardiovascular status: acceptable  Respiratory status: acceptable  Hydration status: acceptable  PONV: none     Anesthetic complications: None          Last vitals:  Vitals:    12/12/18 1002 12/12/18 1311   BP: 152/73 (!) 195/96   Pulse: 65    Resp: 16 14   Temp: 36.8  C (98.2  F) 36.7  C (98  F)   SpO2: 99% 96%         Electronically Signed By: Colby Tan CRNA, APRN CRNA  December 12, 2018  1:28 PM

## 2018-12-12 NOTE — ANESTHESIA CARE TRANSFER NOTE
Patient: Mehreen Morrisonbarshadia    Procedure(s):  LAPAROSCOPIC CHOLECYSTECTOMY    Diagnosis:  Biliary dyskinesia   Diagnosis Additional Information: No value filed.    Anesthesia Type:   General, ETT     Note:  Airway :Nasal Cannula  Patient transferred to:PACU  Handoff Report: Identifed the Patient, Identified the Reponsible Provider, Reviewed the pertinent medical history, Discussed the surgical course, Reviewed Intra-OP anesthesia mangement and issues during anesthesia, Set expectations for post-procedure period and Allowed opportunity for questions and acknowledgement of understanding      Vitals: (Last set prior to Anesthesia Care Transfer)    CRNA VITALS  12/12/2018 1234 - 12/12/2018 1305      12/12/2018             Pulse:  125    SpO2:  98 %    Resp Rate (observed):  18                Electronically Signed By: Colby Tan CRNA, APRN CRNA  December 12, 2018  1:05 PM

## 2018-12-12 NOTE — DISCHARGE INSTRUCTIONS
Same Day Surgery Discharge Instructions  Special Precautions After Surgery - Adult    1. It is not unusual to feel lightheaded or faint, up to 24 hours after surgery or while taking pain medication.  If you have these symptoms; sit for a few minutes before standing and have someone assist you when getting up.  2. You should rest and relax for the next 24 hours and must have someone stay with you for at least 24 hours after your discharge.  3. DO NOT DRIVE any vehicle or operate mechanical equipment for 24 hours following the end of your surgery.  DO NOT DRIVE while taking narcotic pain medications that have been prescribed by your physician.  If you had a limb operated on, you must be able to use it fully to drive.  4. DO NOT drink alcoholic beverages for 24 hours following surgery or while taking prescription pain medication.  5. Drink clear liquids (apple juice, ginger ale, broth, 7-Up, etc.).  Progress to your regular diet as you feel able.  6. Any questions call your physician and do not make important decisions for 24 hours.    ACTIVITY  ? Per surgeon     INCISIONAL CARE  ? May bathe / shower after 24 hours.  ? Apply ice as needed for comfort.  ? Be alert for signs of infection:  redness, swelling, heat, drainage of pus, and/or elevated temperature.  Contact your doctor if these occur.        Call for an appointment to return to the clinic in 1-2 weeks    Medications:  ? Hydrocodone (Norco, Vicodin):  Next dose: _________.  ? Ibuprofen: Next dose: ______________.  ? Stool softener to avoid constipation  ? Follow the instructions on the bottle.       __________________________________________________________________________________________________________________________________        IMPORTANT NUMBERS:    [facility] Main Number:  [phone], [phone]  Pharmacy:  [phone]  Same Day Surgery:  [phone], Monday - Friday until 8:30 p.m.  Urgent Care:  [phone]  Emergency Room:   [phone]      [facility]:  [phone]                                                                              [facility]:  [phone]             Wash incisions daily with soap and water. Some mild redness or swelling is expected. If draining, cover with dry gauze.    No lifting restrictions.  You may lift as comfort permits.    Okay to use ice pack over wound as necessary for comfort.    Use pain medication as necessary but avoid constipating side effects. Ibuprofen okay but avoid Tylenol as your pain medication already contains this.    Diet as tolerated. No restrictions.    Follow up with Dr Sebastian in 1-2 weeks.

## 2018-12-13 VITALS
HEIGHT: 66 IN | BODY MASS INDEX: 23.92 KG/M2 | TEMPERATURE: 97.4 F | RESPIRATION RATE: 18 BRPM | DIASTOLIC BLOOD PRESSURE: 77 MMHG | SYSTOLIC BLOOD PRESSURE: 177 MMHG | HEART RATE: 82 BPM | WEIGHT: 148.81 LBS | OXYGEN SATURATION: 95 %

## 2018-12-13 PROCEDURE — G0378 HOSPITAL OBSERVATION PER HR: HCPCS

## 2018-12-13 PROCEDURE — A9270 NON-COVERED ITEM OR SERVICE: HCPCS | Mod: GY | Performed by: SURGERY

## 2018-12-13 PROCEDURE — 96376 TX/PRO/DX INJ SAME DRUG ADON: CPT

## 2018-12-13 PROCEDURE — 25000128 H RX IP 250 OP 636: Performed by: SURGERY

## 2018-12-13 PROCEDURE — 25000132 ZZH RX MED GY IP 250 OP 250 PS 637: Mod: GY | Performed by: SURGERY

## 2018-12-13 RX ORDER — CARBIDOPA AND LEVODOPA 25; 100 MG/1; MG/1
1 TABLET ORAL 3 TIMES DAILY
Status: DISCONTINUED | OUTPATIENT
Start: 2018-12-13 | End: 2018-12-13 | Stop reason: HOSPADM

## 2018-12-13 RX ADMIN — LEVOTHYROXINE SODIUM 137 MCG: 112 TABLET ORAL at 06:28

## 2018-12-13 RX ADMIN — OMEPRAZOLE 20 MG: 20 CAPSULE, DELAYED RELEASE ORAL at 08:49

## 2018-12-13 RX ADMIN — RANITIDINE 150 MG: 150 TABLET ORAL at 08:44

## 2018-12-13 RX ADMIN — CARBIDOPA AND LEVODOPA 1 HALF-TAB: 25; 100 TABLET ORAL at 08:57

## 2018-12-13 RX ADMIN — DEXTROSE AND SODIUM CHLORIDE: 5; 900 INJECTION, SOLUTION INTRAVENOUS at 07:32

## 2018-12-13 RX ADMIN — CARBIDOPA AND LEVODOPA 1 HALF-TAB: 25; 100 TABLET ORAL at 13:41

## 2018-12-13 RX ADMIN — HYDROCODONE BITARTRATE AND ACETAMINOPHEN 1 TABLET: 5; 325 TABLET ORAL at 00:44

## 2018-12-13 RX ADMIN — CITALOPRAM HYDROBROMIDE 20 MG: 20 TABLET ORAL at 08:45

## 2018-12-13 RX ADMIN — HYDROCODONE BITARTRATE AND ACETAMINOPHEN 2 TABLET: 5; 325 TABLET ORAL at 13:41

## 2018-12-13 RX ADMIN — GABAPENTIN 100 MG: 100 CAPSULE ORAL at 13:41

## 2018-12-13 RX ADMIN — HYDROCODONE BITARTRATE AND ACETAMINOPHEN 2 TABLET: 5; 325 TABLET ORAL at 09:03

## 2018-12-13 RX ADMIN — KETOROLAC TROMETHAMINE 15 MG: 15 INJECTION, SOLUTION INTRAMUSCULAR; INTRAVENOUS at 05:01

## 2018-12-13 RX ADMIN — CARBIDOPA AND LEVODOPA: 25; 100 TABLET ORAL at 08:43

## 2018-12-13 RX ADMIN — GABAPENTIN 100 MG: 100 CAPSULE ORAL at 08:44

## 2018-12-13 RX ADMIN — HYDROCODONE BITARTRATE AND ACETAMINOPHEN 1 TABLET: 5; 325 TABLET ORAL at 05:01

## 2018-12-13 RX ADMIN — CARBIDOPA AND LEVODOPA 1 TABLET: 25; 100 TABLET ORAL at 08:57

## 2018-12-13 RX ADMIN — CARBIDOPA AND LEVODOPA 1 TABLET: 25; 100 TABLET ORAL at 13:44

## 2018-12-13 NOTE — PLAN OF CARE
"Pt alert and oriented. Pain improving with IV toradol PRN norco. Was give PRN IV dilaudid x1 for breakthrough pain. Pt placed on capnography early evening and did not tolerate this increased her anxiety and she reports, \"This is making my pain worse\" Pt refused to continue capnography monitoring. Up with SBA and walker voiding spontaneously. Nausea resolved after PRN zofran x1. Lap sites NAVEEN no drainage and approximated. Writer encouraged activity to help gas pains. Pt belching and passing flatus.   "

## 2018-12-13 NOTE — OP NOTE
Procedure Date: 12/12/2018      PREOPERATIVE DIAGNOSIS:  Biliary dyskinesia, severe postprandial abdominal pain.      POSTOPERATIVE DIAGNOSIS:  Biliary dyskinesia, severe postprandial abdominal pain, plus multiple abdominal adhesions.      PROCEDURES PERFORMED:  Laparoscopic cholecystectomy with lysis of adhesions.      SURGEON:  Amadeo Sebastian MD      ASSISTANT:  FABY Dos Santos   I requested his assistance for his expertise with camera management, laparoscopic instrumentation and hemostasis.      ANESTHESIA:  General.      INDICATIONS:  This 78-year-old female has had persistent problems with right upper quadrant pain which occur after eating.  She has had multiple studies done to work this up and the only thing that we found was that she had severe pain with injection of cholecystokinin for her gallbladder emptying study.  Since nothing else was discovered it was felt that cholecystectomy would be advised.  No guarantees were made.  Prior to the procedure, the patient was counseled about the risks, benefits and alternatives of the procedure and she agreed to proceed.  All of her questions were answered.      DESCRIPTION OF PROCEDURE:  The patient was brought to the operating room, placed on the table in the supine position.  After induction of adequate general endotracheal anesthesia, the patient's abdomen was prepped and draped in the usual sterile fashion.  A surgical timeout was performed at this point to identify both the patient and the proposed procedure.  All present were in agreement.      A midline incision was made just below the umbilicus above a lower midline incision that she had from a previous procedure.  Dissection was carried down through subcutaneous tissues to the fascia.  The fascia was incised in the midline and two stay sutures of 0 Vicryl were placed.  The fascia was further elevated and the peritoneal cavity bluntly entered.  The Pavel trocar was placed and the abdomen insufflated to  15 mmHg pressure with CO2 gas.  The 10 mm scope was then placed into the abdomen where immediately we found that there were quite a number of adhesions present in the midline to the right side of the abdomen.  Some of them tenting up some colon.  I elected to put the next 5 mm port in the left upper quadrant in a clear spot of the abdomen.  With a 10 mm scope and one working port I was able to take down adhesions using a combination of scissors as well as LigaSure dissection.  Once the right upper quadrant was cleared of adhesions.  Two additional ports were placed in the usual place in the right subcostal region.  The patient was placed in reverse Trendelenburg position with the left side rotated downward.  I spent approximately 30 minutes taking down adhesions.      The gallbladder fundus was grasped and retracted superiorly and anteriorly exposing the infundibulum.  A second grasper was placed here.  Since I had the LigaSure, I used it to do the dissection and take down the peritoneum on either side of the cystic duct.  With a combination of visual inspection as well as indocyanine green fluorescence, I was able to clearly identify both the cystic duct and cystic artery and common bile duct.  A critical view was obtained.  The cystic duct and cystic artery were individually clipped and divided with the scissors.  The gallbladder was then removed from the gallbladder bed using the LigaSure.  There was no bleeding from this dissection.  The gallbladder was placed into an Endobag and retrieved through the infraumbilical trocar site.  The Pavel was replaced and the right upper quadrant irrigated.  There was no bleeding noted.  The clips were intact.  There were additional adhesions noted lower in the abdomen but we left those alone.  The 5 mm ports were all removed under direct vision without evidence of bleeding.  The Pavel port was removed and the abdomen desufflated.  The fascia at the Pavel site was closed  with an interrupted 0 Vicryl suture.  All the wounds were infiltrated with Marcaine with epinephrine and then closed with subcuticular Monocryl suture.  The patient was then awakened from her anesthetic and taken to the recovery room awake and in stable condition, having tolerated the procedure well.  There were no complications.  Needle, sponge, and instrument counts were correct x 2.  Estimated blood loss was far less than 5 mL.         RODRÍGUEZ DAVEY MD             D: 2018   T: 2018   MT:       Name:     JEB DELGADO   MRN:      [id number]        Account:        [id number]   :      1940           Procedure Date: 2018      Document: [id number]

## 2018-12-13 NOTE — PROGRESS NOTES
"[facility] ADMISSION NOTE    Patient admitted to room 2305 at approximately 1705 via wheel chair from surgery. Patient was accompanied by transport tech.     Verbal SBAR report received from PACU nurse prior to patient arrival.     Patient ambulated to bed with one assist. Patient alert and oriented X 3. Pain is controlled with current analgesics.  Medication(s) being used: narcotic analgesics including demerol, dilaudid and norco. 0-10 Pain Scale: 3. Admission vital signs: Blood pressure 180/87, pulse 91, temperature 98  F (36.7  C), temperature source Oral, resp. rate 20, height 1.676 m (5' 6\"), weight 67.5 kg (148 lb 13 oz), SpO2 94 %, not currently breastfeeding. Patient and daughter were oriented to plan of care, call light, bed controls, tv, telephone, bathroom and visiting hours.     Risk Assessment    The following safety risks were identified during admission: fall. Yellow risk band applied: YES.     Skin Initial Assessment    This writer admitted this patient and completed a full skin assessment and Adal score in the Adult PCS flowsheet. Appropriate interventions initiated as needed.     Secondary skin check completed by Alysa.    Skin  Inspection of bony prominences: Full body assessment completed. Incisions, bruising, scars, scabs and rash noted.  Procedural focused assessment (identify areas inspected) : Abdomen         Samanta Ly    "

## 2018-12-13 NOTE — PROGRESS NOTES
Patient has  [place] to Observation  order. Patient has been given Patient Bill of Rights, Observation brochure and  What does Observation mean to me  forms.  Patient has been given the opportunity to ask questions about observation status and their plan of care.

## 2018-12-13 NOTE — DISCHARGE SUMMARY
Physician Discharge Summary     Patient ID:  Mehreen Camara  [id number]  78 year old  1940    Admit date: 12/12/2018    Discharge date and time: 12/13/2018     Admitting Physician: Amadeo Sebastian MD     Discharge Physician: Amadeo Sebastian MD    Admission Diagnoses:  Biliary dyskinesia   S/P laparoscopic cholecystectomy    Discharge Diagnoses: s/p laparoscopic cholecystectomy    Admission Condition: fair    Discharged Condition: fair    Indication for Admission: pain control after surgery    Hospital Course: Patient was admitted as a same-day overnight stay after laparoscopic cholecystectomy.  She felt well enough to go back to her assisted living facility today.  No post-op complications.    Consults: none    Significant Diagnostic Studies: none    Treatments: surgery: Laparoscopic cholecystectomy    Discharge Exam:  Heart-regular  Lungs - clear  Abdomen soft and non-tender, wounds C/D/I    Disposition: assisted living    Patient Instructions:   Current Discharge Medication List      START taking these medications    Details   HYDROcodone-acetaminophen (NORCO) 5-325 MG tablet Take 1 tablet by mouth every 4 hours as needed for moderate to severe pain  Qty: 10 tablet, Refills: 0    Associated Diagnoses: Post-op pain         CONTINUE these medications which have NOT CHANGED    Details   !! acetaminophen (TYLENOL) 500 MG tablet Take 1 tablet (500 mg) by mouth 3 times daily  Qty: 90 tablet, Refills: 1    Associated Diagnoses: Chronic bilateral low back pain with bilateral sciatica      !! acetaminophen (TYLENOL) 500 MG tablet Take 1-2 tablets (500-1,000 mg) by mouth 3 times daily as needed for mild pain  Qty: 60 tablet, Refills: 3    Associated Diagnoses: Primary osteoarthritis involving multiple joints      calcium carbonate (TUMS) 500 MG chewable tablet Take 1 tablet (500 mg) by mouth every 2 hours as needed for heartburn  Qty: 150 tablet, Refills: 1    Associated Diagnoses: Nausea      carbidopa-levodopa  (SINEMET)  MG per tablet Take 2 tablets by mouth 3 times daily  Qty: 180 tablet, Refills: 1    Associated Diagnoses: Movement disorder      cholecalciferol 2000 units tablet Take 1 tablet by mouth daily  Qty: 30 tablet, Refills: 3    Associated Diagnoses: Age-related osteoporosis without current pathological fracture      citalopram (CELEXA) 20 MG tablet Take 1 tablet (20 mg) by mouth daily  Qty: 90 tablet, Refills: 1    Associated Diagnoses: Episode of recurrent major depressive disorder, unspecified depression episode severity (H)      diclofenac (VOLTAREN) 1 % GEL topical gel Place onto the skin 4 times daily QID prn to back and neck    Associated Diagnoses: Primary osteoarthritis involving multiple joints      !! gabapentin (NEURONTIN) 100 MG capsule Take 100 mg by mouth 2 times daily      !! gabapentin (NEURONTIN) 100 MG capsule Take 2 capsules (200 mg) by mouth At Bedtime  Qty: 90 capsule, Refills: 1    Associated Diagnoses: Chronic bilateral low back pain with bilateral sciatica      levothyroxine (SYNTHROID/LEVOTHROID) 137 MCG tablet Take 1 tablet (137 mcg) by mouth daily  Qty: 90 tablet, Refills: 3    Associated Diagnoses: Acquired hypothyroidism      multivitamin, therapeutic with minerals (MULTI-VITAMIN) TABS tablet Take 1 tablet by mouth daily  Qty: 100 tablet, Refills: 3    Associated Diagnoses: Takes dietary supplements      Nutritional Supplements (NUTRITIONAL SUPPLEMENT PO) Take by mouth daily      omeprazole (PRILOSEC) 20 MG CR capsule Take 1 capsule (20 mg) by mouth 2 times daily  Qty: 90 capsule, Refills: 3    Associated Diagnoses: Harris's esophagus with dysplasia      polyethylene glycol (MIRALAX) powder Take 17 g by mouth daily  Qty: 510 g, Refills: 1    Associated Diagnoses: Chronic idiopathic constipation      polyethylene glycol 0.4%- propylene glycol 0.3% (SYSTANE ULTRA) 0.4-0.3 % SOLN ophthalmic solution Place 1 drop into both eyes 4 times daily as needed for dry eyes    Associated  Diagnoses: Dry eyes      ranitidine (ZANTAC) 150 MG capsule Take 1 capsule (150 mg) by mouth At Bedtime  Qty: 180 capsule, Refills: 3    Associated Diagnoses: Harris's esophagus with dysplasia      senna (SENOKOT) 8.6 MG tablet Take 1 tablet by mouth daily as needed for constipation  Qty: 120 tablet, Refills: 1    Associated Diagnoses: Chronic idiopathic constipation      bisacodyl (DULCOLAX) 10 MG Suppository Place 10 mg rectally daily as needed for constipation      triamcinolone (KENALOG) 0.1 % cream Apply topically 2 times daily as needed for irritation BID prn to feet    Associated Diagnoses: Dermatitis      Zinc Oxide (DESITIN) 13 % CREA Externally apply topically 2 times daily       !! - Potential duplicate medications found. Please discuss with provider.        Activity: activity as tolerated  Diet: regular diet  Wound Care: none needed    Follow-up with Dr. Sebastian in 2 weeks.    Signed:  Amadeo Sebastian  12/13/2018  9:16 AM

## 2018-12-13 NOTE — PLAN OF CARE
[facility] DISCHARGE NOTE    Patient discharged to assisted living at 3:31 PM via wheel chair. Accompanied by daughter and staff. Discharge instructions reviewed with daughter, opportunity offered to ask questions. Prescriptions filled and sent with patient upon discharge. All belongings sent with patient.    Diya Maldonado

## 2018-12-18 LAB — COPATH REPORT: NORMAL

## 2018-12-31 ENCOUNTER — OFFICE VISIT (OUTPATIENT)
Dept: SURGERY | Facility: CLINIC | Age: 78
End: 2018-12-31
Payer: COMMERCIAL

## 2018-12-31 VITALS
TEMPERATURE: 98 F | DIASTOLIC BLOOD PRESSURE: 73 MMHG | WEIGHT: 149 LBS | HEIGHT: 66 IN | BODY MASS INDEX: 23.95 KG/M2 | HEART RATE: 71 BPM | SYSTOLIC BLOOD PRESSURE: 147 MMHG

## 2018-12-31 DIAGNOSIS — Z98.890 POSTOPERATIVE STATE: Primary | ICD-10-CM

## 2018-12-31 PROCEDURE — 99024 POSTOP FOLLOW-UP VISIT: CPT | Performed by: SURGERY

## 2018-12-31 ASSESSMENT — MIFFLIN-ST. JEOR: SCORE: 1172.61

## 2018-12-31 NOTE — PROGRESS NOTES
Mehreen is here for a postop check.  She is about 2-1/2 weeks out from a laparoscopic cholecystectomy which was performed for biliary dyskinesia.  He spent one night in the hospital for pain control issues and then was discharged home.  He is made an unremarkable recovery.  She admits that she is able to eat now without a bit of pain.  She is very happy about that.    She had a couple of other questions regarding a dry mouth and some heartburn issues, but in general he is doing fantastic.    On exam: Her wounds are healing nicely.  No sign of infection.    Pathology report was reviewed which showed no pathologic abnormalities.    Photographs were reviewed with the patient and her family.    Impression: Satisfactory postoperative course.  No issues identified.    Recommendation: Return to usual activities as tolerated.  Otherwise, return as needed.    Amadeo Sebastian MD FACS

## 2018-12-31 NOTE — LETTER
12/31/2018         RE: Mehreen Camara  [facility]  [address]  [facility] [address]        Dear Colleague,    Thank you for referring your patient, Mehreen Camara, to the [facility]. Please see a copy of my visit note below.    Mehreen is here for a postop check.  She is about 2-1/2 weeks out from a laparoscopic cholecystectomy which was performed for biliary dyskinesia.  He spent one night in the hospital for pain control issues and then was discharged home.  He is made an unremarkable recovery.  She admits that she is able to eat now without a bit of pain.  She is very happy about that.    She had a couple of other questions regarding a dry mouth and some heartburn issues, but in general he is doing fantastic.    On exam: Her wounds are healing nicely.  No sign of infection.    Pathology report was reviewed which showed no pathologic abnormalities.    Photographs were reviewed with the patient and her family.    Impression: Satisfactory postoperative course.  No issues identified.    Recommendation: Return to usual activities as tolerated.  Otherwise, return as needed.    Amadeo Sebastian MD FACS    Again, thank you for allowing me to participate in the care of your patient.        Sincerely,        Amadeo Sebastian MD

## 2018-12-31 NOTE — NURSING NOTE
"Initial /73 (BP Location: Right arm, Patient Position: Sitting, Cuff Size: Adult Regular)   Pulse 71   Temp 98  F (36.7  C) (Tympanic)   Ht 1.676 m (5' 6\")   Wt 67.6 kg (149 lb)   BMI 24.05 kg/m   Estimated body mass index is 24.05 kg/m  as calculated from the following:    Height as of this encounter: 1.676 m (5' 6\").    Weight as of this encounter: 67.6 kg (149 lb). .    Diya Jon CMA    "

## 2019-01-17 ENCOUNTER — RECORDS - HEALTHEAST (OUTPATIENT)
Dept: HOME HEALTH SERVICES | Facility: HOME HEALTH | Age: 79
End: 2019-01-17

## 2019-01-21 NOTE — PROGRESS NOTES
[facility]  Chief Complaint   Patient presents with     Annual Comprehensive Nursing Home       [facility] Record Number:  [id number]  Place of Service where encounter took place:  [facility] [[id number]]      HPI:    Mehreen Camara is a 79 year old  (1940), who is being seen today for an annual comprehensive visit.  HPI information obtained from: facility chart records, facility staff, patient report and [facility] chart review.  Today's concerns are:     Esophageal reflux  Chronic bilateral low back pain with bilateral sciatica  Multiple system atrophy (H)  Aneurysm of abdominal vessel (H)  Episode of recurrent major depressive disorder, unspecified depression episode severity (H)  Adjustment disorder with mixed anxiety and depressed mood  Orthostatic hypotension dysautonomic syndrome (H)  Essential hypertension  Acquired hypothyroidism  Primary osteoarthritis involving multiple joints     Mehreen reports ongoing and chronic back pain not well controlled with current regimen of tylenol, gabapentin, diclofenac gel, norco.  She reports she feels pain in her low abdomen/back/legs after AAA repair with stent placement. She also reports chronic neck pain (SP neck surgery many years ago) not well controlled.          ALLERGIES: Penicillins; Aspirin; Atenolol; Atorvastatin; Bupropion; Clindamycin; Codeine; Ibuprofen; Lovastatin; and Cephalexin  PROBLEM LIST:  Patient Active Problem List   Diagnosis     Adjustment disorder with anxious mood     Adjustment disorder with mixed anxiety and depressed mood     Adrenal adenoma     Aneurysm of abdominal vessel (H)     Harris's esophagus     Bradycardia     Cataract     Major depressive disorder, recurrent episode (H)     Constipation     Dizziness     Dyslipidemia     Esophageal reflux     Hypertension     Hypothyroidism     Insomnia due to anxiety and fear     Lower back pain     Osteoarthrosis     Disorder of bone and cartilage      Weakness     Lower extremity weakness     Orthostatic hypotension dysautonomic syndrome (H)     Primary insomnia     Disorder of bursae and tendons in shoulder region     S/P AAA repair using bifurcation graft     Urinary incontinence in female     Movement disorder     Rheumatic aortic stenosis     Multiple system atrophy (H)     Post-op pain     S/P laparoscopic cholecystectomy     PAST MEDICAL HISTORY:  has a past medical history of Adrenal adenoma, Arthritis, Depressive disorder, Hypertension, Rheumatic fever, Small bowel obstruction (H), and Thyroid disease.  PAST SURGICAL HISTORY:  has a past surgical history that includes orthopedic surgery; Thyroidectomy (2011); joint replacement (Right, 2003); Hysterectomy (2013); carpal tunnel release rt/lt (Bilateral, 2013); Spine surgery (1981); aaa repair (2015); and Laparoscopic cholecystectomy (N/A, 12/12/2018).  FAMILY HISTORY: family history includes Coronary Artery Disease in her father and mother; Hypertension in her mother; Other Cancer in her brother; Parkinsonism in an other family member.  SOCIAL HISTORY:  reports that she quit smoking about 24 years ago. Her smoking use included cigarettes. She smoked 0.50 packs per day. she has never used smokeless tobacco. She reports that she does not drink alcohol or use drugs.  IMMUNIZATIONS:  Most Recent Immunizations   Administered Date(s) Administered     Influenza (High Dose) 3 valent vaccine 09/04/2018     Pneumo Conj 13-V (2010&after) 09/22/2015     Pneumococcal 23 valent 11/02/2010     Tdap (Adult) Unspecified Formulation 06/08/2011     Zoster vaccine, live 06/08/2011     Above immunizations pulled from [facility]. [facility] and facility records also reconciled. Outstanding information sent to  to update [facility] yes.  Future immunizations are not needed at this point as all recommended immunizations are up to date.   MEDICATIONS:  Current Outpatient Medications   Medication Sig Dispense Refill      acetaminophen (TYLENOL) 500 MG tablet Take 1 tablet (500 mg) by mouth 3 times daily 90 tablet 1     acetaminophen (TYLENOL) 500 MG tablet Take 1-2 tablets (500-1,000 mg) by mouth 3 times daily as needed for mild pain 60 tablet 3     amLODIPine (NORVASC) 2.5 MG tablet Take 1 tablet (2.5 mg) by mouth daily 90 tablet 3     bisacodyl (DULCOLAX) 10 MG Suppository Place 10 mg rectally daily as needed for constipation       calcium carbonate (TUMS) 500 MG chewable tablet Take 1 tablet (500 mg) by mouth every 2 hours as needed for heartburn 150 tablet 1     carbidopa-levodopa (SINEMET)  MG per tablet Take 2 tablets by mouth 3 times daily (Patient taking differently: Take 1.5 tablets by mouth 3 times daily 1/1/2 tablets 3 times a day) 180 tablet 1     cholecalciferol 2000 units tablet Take 1 tablet by mouth daily 30 tablet 3     citalopram (CELEXA) 20 MG tablet Take 1 tablet (20 mg) by mouth daily 90 tablet 1     diclofenac (VOLTAREN) 1 % topical gel Apply 4 g topically 4 times daily as needed for moderate pain (to back and neck) 100 g 11     gabapentin (NEURONTIN) 100 MG capsule Take 2 capsules (200 mg) by mouth 3 times daily 90 capsule 5     levothyroxine (SYNTHROID/LEVOTHROID) 137 MCG tablet Take 1 tablet (137 mcg) by mouth daily 90 tablet 3     multivitamin, therapeutic with minerals (MULTI-VITAMIN) TABS tablet Take 1 tablet by mouth daily 100 tablet 3     Nutritional Supplements (NUTRITIONAL SUPPLEMENT PO) Take by mouth daily       omeprazole (PRILOSEC) 20 MG CR capsule Take 1 capsule (20 mg) by mouth 2 times daily 90 capsule 3     polyethylene glycol (MIRALAX) powder Take 17 g by mouth daily 510 g 1     polyethylene glycol 0.4%- propylene glycol 0.3% (SYSTANE ULTRA) 0.4-0.3 % SOLN ophthalmic solution Place 1 drop into both eyes 4 times daily as needed for dry eyes       ranitidine (ZANTAC) 150 MG capsule Take 1 capsule (150 mg) by mouth At Bedtime (Patient taking differently: Take 150 mg by mouth 2 times  daily ) 180 capsule 3     senna (SENOKOT) 8.6 MG tablet Take 1 tablet by mouth daily as needed for constipation 120 tablet 1     triamcinolone (KENALOG) 0.1 % cream Apply topically 2 times daily as needed for irritation BID prn to feet       Zinc Oxide (DESITIN) 13 % CREA Externally apply topically 2 times daily       Medications reviewed:  Medications reconciled to facility chart and changes were made to reflect current medications as identified as above med list. Below are the changes that were made:   Medications stopped since last EPIC medication reconciliation:   There are no discontinued medications.    Medications started since last [facility] medication reconciliation:  No orders of the defined types were placed in this encounter.    Case Management:  I have reviewed the Assisted Living care plan, current immunizations and preventive care/cancer screening..Future cancer screening is not clinically indicated secondary to age/goals of care Patient's desire to return to the community is currently living in a community environment. Current Level of Care is appropriate.    Advance Directive Discussion:    I reviewed the current advanced directives as reflected in EPIC, the POLST and the facility chart, and verified the congruency of orders. I contacted the first party daughters, and discussed the plan of Care.  I did review the advance directives with the resident.     Team Discussion:  I communicated with the appropriate disciplines involved with the Plan of Care:   Nursing      Patient Goal:  Patient's goal is pain control and comfort.    Information reviewed:  Medications, vital signs, orders, and nursing notes.    ROS:  4 point ROS including Respiratory, CV, GI and , other than that noted in the HPI,  is negative    Exam:  /80   Pulse 83   Temp 98.2  F (36.8  C)   Resp 18   Wt 64.9 kg (143 lb)   SpO2 98%   BMI 23.08 kg/m    GENERAL APPEARANCE:  Alert, thin, anxious  RESP:  respiratory effort and  palpation of chest normal, lungs clear to auscultation , no respiratory distress  CV:  Palpation and auscultation of heart done , regular rate and rhythm, no murmur, rub, or gallop, no edema  M/S:   Gait and station abnormal , using walker for longer distances  PSYCH:  oriented X 3, anxious       Lab/Diagnostic data:   Reviewed in records.        ASSESSMENT/PLAN  Esophageal reflux  Continues with some belching, on medications as noted.      Chronic bilateral low back pain with bilateral sciatica  Patient with significant chronic back pain, bilateral sciatica down both legs, neck pain, groin pain.    - gabapentin (NEURONTIN) 100 MG capsule; Take 2 capsules (200 mg) by mouth 3 times daily  - PAIN MANAGEMENT REFERRAL    Multiple system atrophy (H)  Being followed by neurology, on sinemet, and being dosed by them.      Aneurysm of abdominal vessel (H)  SP repair of AAA with graft in 11/2015, without new symptoms.  She reports she feels some pain that she is sure is from the graft.     Episode of recurrent major depressive disorder, unspecified depression episode severity (H)  Adjustment disorder with mixed anxiety and depressed mood  On celexa, continues with significant anxiety but reports no depression.     Orthostatic hypotension dysautonomic syndrome (H)  Essential hypertension  She has had significant changes in BP, now trending quite high. BP goals are  <140/90 mm Hg.This is higher than ACC and AHA recommendations due to risk for hypotension, risk of dizziness and falls and risk of tissue/cerebral hypoperfusion. Noted patients BP is higher than goal and will adjust plan of care by addition of amlodipine, ongoing losartan dosing.   - amLODIPine (NORVASC) 2.5 MG tablet; Take 1 tablet (2.5 mg) by mouth daily    Acquired hypothyroidism  On levothyroxine, last TSH not on record.  Will check at next lab draw.     Primary osteoarthritis involving multiple joints  Ongoing pain in shoulder, knees.  - diclofenac (VOLTAREN)  1 % topical gel; Apply 4 g topically 4 times daily as needed for moderate pain (to back and neck)      Orders:  1. Increase gabapentin to 200 TID po Dx neuropathic  2. Reorder diclofenac, tube is almost empty  3. Amlodipine 2.5 mg po every day Dx HTN  4.  Pain management referral-approved by family.     Electronically signed by:  HAILEE Contreras CNP

## 2019-01-22 ENCOUNTER — ASSISTED LIVING VISIT (OUTPATIENT)
Dept: GERIATRICS | Facility: CLINIC | Age: 79
End: 2019-01-22
Payer: COMMERCIAL

## 2019-01-22 VITALS
BODY MASS INDEX: 23.08 KG/M2 | HEART RATE: 83 BPM | SYSTOLIC BLOOD PRESSURE: 118 MMHG | WEIGHT: 143 LBS | OXYGEN SATURATION: 98 % | RESPIRATION RATE: 18 BRPM | DIASTOLIC BLOOD PRESSURE: 80 MMHG | TEMPERATURE: 98.2 F

## 2019-01-22 DIAGNOSIS — K21.9 ESOPHAGEAL REFLUX: ICD-10-CM

## 2019-01-22 DIAGNOSIS — I95.1 ORTHOSTATIC HYPOTENSION DYSAUTONOMIC SYNDROME: ICD-10-CM

## 2019-01-22 DIAGNOSIS — G23.8 MULTIPLE SYSTEM ATROPHY (H): ICD-10-CM

## 2019-01-22 DIAGNOSIS — G90.3 MULTIPLE SYSTEM ATROPHY (H): ICD-10-CM

## 2019-01-22 DIAGNOSIS — F33.9 EPISODE OF RECURRENT MAJOR DEPRESSIVE DISORDER, UNSPECIFIED DEPRESSION EPISODE SEVERITY (H): ICD-10-CM

## 2019-01-22 DIAGNOSIS — I71.40 ANEURYSM OF ABDOMINAL VESSEL (H): ICD-10-CM

## 2019-01-22 DIAGNOSIS — E03.9 ACQUIRED HYPOTHYROIDISM: ICD-10-CM

## 2019-01-22 DIAGNOSIS — I10 ESSENTIAL HYPERTENSION: ICD-10-CM

## 2019-01-22 DIAGNOSIS — M54.41 CHRONIC BILATERAL LOW BACK PAIN WITH BILATERAL SCIATICA: Primary | ICD-10-CM

## 2019-01-22 DIAGNOSIS — F43.23 ADJUSTMENT DISORDER WITH MIXED ANXIETY AND DEPRESSED MOOD: ICD-10-CM

## 2019-01-22 DIAGNOSIS — M54.42 CHRONIC BILATERAL LOW BACK PAIN WITH BILATERAL SCIATICA: Primary | ICD-10-CM

## 2019-01-22 DIAGNOSIS — M15.0 PRIMARY OSTEOARTHRITIS INVOLVING MULTIPLE JOINTS: ICD-10-CM

## 2019-01-22 DIAGNOSIS — G89.29 CHRONIC BILATERAL LOW BACK PAIN WITH BILATERAL SCIATICA: Primary | ICD-10-CM

## 2019-01-22 RX ORDER — GABAPENTIN 100 MG/1
200 CAPSULE ORAL 3 TIMES DAILY
Qty: 90 CAPSULE | Refills: 5 | Status: SHIPPED | OUTPATIENT
Start: 2019-01-22 | End: 2019-05-11

## 2019-01-22 RX ORDER — AMLODIPINE BESYLATE 2.5 MG/1
2.5 TABLET ORAL DAILY
Qty: 90 TABLET | Refills: 3 | Status: SHIPPED | OUTPATIENT
Start: 2019-01-22 | End: 2019-03-02 | Stop reason: DRUGHIGH

## 2019-01-24 RX ORDER — HYDROCODONE BITARTRATE AND ACETAMINOPHEN 5; 325 MG/1; MG/1
1 TABLET ORAL DAILY
COMMUNITY
End: 2019-02-05

## 2019-02-05 DIAGNOSIS — M54.42 CHRONIC BILATERAL LOW BACK PAIN WITH BILATERAL SCIATICA: Primary | ICD-10-CM

## 2019-02-05 DIAGNOSIS — M54.41 CHRONIC BILATERAL LOW BACK PAIN WITH BILATERAL SCIATICA: Primary | ICD-10-CM

## 2019-02-05 DIAGNOSIS — G89.29 CHRONIC BILATERAL LOW BACK PAIN WITH BILATERAL SCIATICA: Primary | ICD-10-CM

## 2019-02-05 RX ORDER — HYDROCODONE BITARTRATE AND ACETAMINOPHEN 5; 325 MG/1; MG/1
TABLET ORAL
Qty: 60 TABLET | Refills: 0 | Status: SHIPPED | OUTPATIENT
Start: 2019-02-05 | End: 2019-02-27

## 2019-02-06 ENCOUNTER — COMMUNICATION - HEALTHEAST (OUTPATIENT)
Dept: CARDIOLOGY | Facility: CLINIC | Age: 79
End: 2019-02-06

## 2019-02-12 ENCOUNTER — RECORDS - HEALTHEAST (OUTPATIENT)
Dept: LAB | Facility: CLINIC | Age: 79
End: 2019-02-12

## 2019-02-12 ENCOUNTER — TRANSFERRED RECORDS (OUTPATIENT)
Dept: HEALTH INFORMATION MANAGEMENT | Facility: CLINIC | Age: 79
End: 2019-02-12

## 2019-02-12 LAB
ANION GAP SERPL CALCULATED.3IONS-SCNC: 6 MMOL/L (ref 5–18)
ANION GAP SERPL CALCULATED.3IONS-SCNC: 6 MMOL/L (ref 5–18)
BASOPHILS # BLD AUTO: 0 THOU/UL (ref 0–0.2)
BASOPHILS NFR BLD AUTO: 1 % (ref 0–2)
BUN SERPL-MCNC: 12 MG/DL (ref 8–28)
BUN SERPL-MCNC: 12 MG/DL (ref 8–28)
C DIFF TOX B STL QL: NEGATIVE
CALCIUM SERPL-MCNC: 9.4 MG/DL (ref 8.5–10.5)
CALCIUM SERPL-MCNC: 9.4 MG/DL (ref 8.5–10.5)
CHLORIDE BLD-SCNC: 101 MMOL/L (ref 98–107)
CHLORIDE SERPLBLD-SCNC: 101 MMOL/L (ref 98–107)
CO2 SERPL-SCNC: 32 MMOL/L (ref 22–31)
CO2 SERPL-SCNC: 32 MMOL/L (ref 22–31)
CREAT SERPL-MCNC: 0.68 MG/DL (ref 0.6–1.1)
CREAT SERPL-MCNC: 0.68 MG/DL (ref 0.6–1.1)
DIFFERENTIAL: ABNORMAL
EOSINOPHIL # BLD AUTO: 0.1 THOU/UL (ref 0–0.4)
EOSINOPHIL NFR BLD AUTO: 2 % (ref 0–6)
ERYTHROCYTE [DISTWIDTH] IN BLOOD BY AUTOMATED COUNT: 12.7 % (ref 11–14.5)
ERYTHROCYTE [DISTWIDTH] IN BLOOD BY AUTOMATED COUNT: 12.7 % (ref 11–14.5)
GFR SERPL CREATININE-BSD FRML MDRD: >60 ML/MIN/1.73M2
GFR SERPL CREATININE-BSD FRML MDRD: >60 ML/MIN/1.73M2
GLUCOSE BLD-MCNC: 99 MG/DL (ref 70–125)
GLUCOSE SERPL-MCNC: 99 MG/DL (ref 70–125)
HCT VFR BLD AUTO: 34.4 % (ref 35–47)
HCT VFR BLD AUTO: 34.4 % (ref 35–47)
HEMOGLOBIN: 11.3 G/DL (ref 12–16)
HGB BLD-MCNC: 11.3 G/DL (ref 12–16)
LYMPHOCYTES # BLD AUTO: 1.8 THOU/UL (ref 0.8–4.4)
LYMPHOCYTES NFR BLD AUTO: 30 % (ref 20–40)
MCH RBC QN AUTO: 32.7 PG (ref 27–34)
MCH RBC QN AUTO: 32.7 PG (ref 27–34)
MCHC RBC AUTO-ENTMCNC: 32.8 G/DL (ref 32–36)
MCHC RBC AUTO-ENTMCNC: 32.8 G/DL (ref 32–36)
MCV RBC AUTO: 99 FL (ref 80–100)
MCV RBC AUTO: 99 FL (ref 80–100)
MONOCYTES # BLD AUTO: 0.5 THOU/UL (ref 0–0.9)
MONOCYTES NFR BLD AUTO: 8 % (ref 2–10)
NEUTROPHILS # BLD AUTO: 3.7 THOU/UL (ref 2–7.7)
NEUTROPHILS NFR BLD AUTO: 60 % (ref 50–70)
PLATELET # BLD AUTO: 191 THOU/UL (ref 140–440)
PLATELET # BLD AUTO: 191 THOU/UL (ref 140–440)
PMV BLD AUTO: 10.6 FL (ref 8.5–12.5)
POTASSIUM BLD-SCNC: 4 MMOL/L (ref 3.5–5)
POTASSIUM SERPL-SCNC: 4 MMOL/L (ref 3.5–5)
RBC # BLD AUTO: 3.46 MILL/UL (ref 3.8–5.4)
RBC # BLD AUTO: 3.46 MILL/UL (ref 3.8–5.4)
RIBOTYPE 027/NAP1/BI: NORMAL
SODIUM SERPL-SCNC: 139 MMOL/L (ref 136–145)
SODIUM SERPL-SCNC: 139 MMOL/L (ref 136–145)
WBC # BLD AUTO: 6.1 THOU/UL (ref 4–11)
WBC: 6.1 THOU/UL (ref 4–11)

## 2019-02-13 ENCOUNTER — RECORDS - HEALTHEAST (OUTPATIENT)
Dept: LAB | Facility: HOSPITAL | Age: 79
End: 2019-02-13

## 2019-02-13 LAB
ALBUMIN SERPL-MCNC: 3.9 G/DL (ref 3.5–5)
ALP SERPL-CCNC: 44 U/L (ref 45–120)
ALT SERPL W P-5'-P-CCNC: <9 U/L (ref 0–45)
ANION GAP SERPL CALCULATED.3IONS-SCNC: 8 MMOL/L (ref 5–18)
AST SERPL W P-5'-P-CCNC: 23 U/L (ref 0–40)
BASOPHILS # BLD AUTO: 0 THOU/UL (ref 0–0.2)
BASOPHILS NFR BLD AUTO: 1 % (ref 0–2)
BILIRUB SERPL-MCNC: 0.8 MG/DL (ref 0–1)
BUN SERPL-MCNC: 11 MG/DL (ref 8–28)
CALCIUM SERPL-MCNC: 9.1 MG/DL (ref 8.5–10.5)
CHLORIDE BLD-SCNC: 100 MMOL/L (ref 98–107)
CO2 SERPL-SCNC: 30 MMOL/L (ref 22–31)
CREAT SERPL-MCNC: 0.66 MG/DL (ref 0.6–1.1)
EOSINOPHIL # BLD AUTO: 0.1 THOU/UL (ref 0–0.4)
EOSINOPHIL NFR BLD AUTO: 1 % (ref 0–6)
ERYTHROCYTE [DISTWIDTH] IN BLOOD BY AUTOMATED COUNT: 12.4 % (ref 11–14.5)
GFR SERPL CREATININE-BSD FRML MDRD: >60 ML/MIN/1.73M2
GLUCOSE BLD-MCNC: 89 MG/DL (ref 70–125)
HCT VFR BLD AUTO: 35.7 % (ref 35–47)
HGB BLD-MCNC: 11.7 G/DL (ref 12–16)
LYMPHOCYTES # BLD AUTO: 1.6 THOU/UL (ref 0.8–4.4)
LYMPHOCYTES NFR BLD AUTO: 31 % (ref 20–40)
MCH RBC QN AUTO: 32.3 PG (ref 27–34)
MCHC RBC AUTO-ENTMCNC: 32.8 G/DL (ref 32–36)
MCV RBC AUTO: 99 FL (ref 80–100)
MONOCYTES # BLD AUTO: 0.4 THOU/UL (ref 0–0.9)
MONOCYTES NFR BLD AUTO: 7 % (ref 2–10)
NEUTROPHILS # BLD AUTO: 3.2 THOU/UL (ref 2–7.7)
NEUTROPHILS NFR BLD AUTO: 61 % (ref 50–70)
PLATELET # BLD AUTO: 199 THOU/UL (ref 140–440)
PMV BLD AUTO: 9.9 FL (ref 8.5–12.5)
POTASSIUM BLD-SCNC: 4 MMOL/L (ref 3.5–5)
PROT SERPL-MCNC: 7.1 G/DL (ref 6–8)
RBC # BLD AUTO: 3.62 MILL/UL (ref 3.8–5.4)
SODIUM SERPL-SCNC: 138 MMOL/L (ref 136–145)
TSH SERPL DL<=0.005 MIU/L-ACNC: 1.58 UIU/ML (ref 0.3–5)
VIT B12 SERPL-MCNC: 768 PG/ML (ref 213–816)
WBC: 5.3 THOU/UL (ref 4–11)

## 2019-02-18 ENCOUNTER — RECORDS - HEALTHEAST (OUTPATIENT)
Dept: ADMINISTRATIVE | Facility: OTHER | Age: 79
End: 2019-02-18

## 2019-02-26 ENCOUNTER — COMMUNICATION - HEALTHEAST (OUTPATIENT)
Dept: CARDIOLOGY | Facility: CLINIC | Age: 79
End: 2019-02-26

## 2019-02-26 ENCOUNTER — OFFICE VISIT - HEALTHEAST (OUTPATIENT)
Dept: CARDIOLOGY | Facility: CLINIC | Age: 79
End: 2019-02-26

## 2019-02-26 DIAGNOSIS — I10 ESSENTIAL HYPERTENSION: ICD-10-CM

## 2019-02-26 ASSESSMENT — MIFFLIN-ST. JEOR: SCORE: 1161.01

## 2019-02-27 ENCOUNTER — COMMUNICATION - HEALTHEAST (OUTPATIENT)
Dept: CARDIOLOGY | Facility: CLINIC | Age: 79
End: 2019-02-27

## 2019-02-27 DIAGNOSIS — M54.42 CHRONIC BILATERAL LOW BACK PAIN WITH BILATERAL SCIATICA: ICD-10-CM

## 2019-02-27 DIAGNOSIS — G89.29 CHRONIC BILATERAL LOW BACK PAIN WITH BILATERAL SCIATICA: ICD-10-CM

## 2019-02-27 DIAGNOSIS — M54.41 CHRONIC BILATERAL LOW BACK PAIN WITH BILATERAL SCIATICA: ICD-10-CM

## 2019-02-27 RX ORDER — HYDROCODONE BITARTRATE AND ACETAMINOPHEN 5; 325 MG/1; MG/1
TABLET ORAL
Qty: 90 TABLET | Refills: 0 | Status: SHIPPED | OUTPATIENT
Start: 2019-02-27 | End: 2019-03-05

## 2019-03-02 ENCOUNTER — APPOINTMENT (OUTPATIENT)
Dept: CT IMAGING | Facility: CLINIC | Age: 79
End: 2019-03-02
Attending: FAMILY MEDICINE
Payer: COMMERCIAL

## 2019-03-02 ENCOUNTER — HOSPITAL ENCOUNTER (EMERGENCY)
Facility: CLINIC | Age: 79
Discharge: HOME | End: 2019-03-02
Attending: FAMILY MEDICINE | Admitting: FAMILY MEDICINE
Payer: COMMERCIAL

## 2019-03-02 VITALS
HEART RATE: 77 BPM | OXYGEN SATURATION: 97 % | TEMPERATURE: 98.3 F | BODY MASS INDEX: 23.95 KG/M2 | RESPIRATION RATE: 16 BRPM | WEIGHT: 149 LBS | DIASTOLIC BLOOD PRESSURE: 90 MMHG | SYSTOLIC BLOOD PRESSURE: 181 MMHG | HEIGHT: 66 IN

## 2019-03-02 DIAGNOSIS — I10 HYPERTENSION, UNSPECIFIED TYPE: ICD-10-CM

## 2019-03-02 DIAGNOSIS — K59.03 DRUG-INDUCED CONSTIPATION: ICD-10-CM

## 2019-03-02 LAB
ALBUMIN SERPL-MCNC: 3.9 G/DL (ref 3.4–5)
ALBUMIN UR-MCNC: NEGATIVE MG/DL
ALP SERPL-CCNC: 53 U/L (ref 40–150)
ALT SERPL W P-5'-P-CCNC: 11 U/L (ref 0–50)
ANION GAP SERPL CALCULATED.3IONS-SCNC: 6 MMOL/L (ref 3–14)
APPEARANCE UR: CLEAR
AST SERPL W P-5'-P-CCNC: 18 U/L (ref 0–45)
BASOPHILS # BLD AUTO: 0.1 10E9/L (ref 0–0.2)
BASOPHILS NFR BLD AUTO: 0.9 %
BILIRUB SERPL-MCNC: 0.4 MG/DL (ref 0.2–1.3)
BILIRUB UR QL STRIP: NEGATIVE
BUN SERPL-MCNC: 12 MG/DL (ref 7–30)
CALCIUM SERPL-MCNC: 8.6 MG/DL (ref 8.5–10.1)
CHLORIDE SERPL-SCNC: 101 MMOL/L (ref 94–109)
CO2 SERPL-SCNC: 33 MMOL/L (ref 20–32)
COLOR UR AUTO: NORMAL
CREAT SERPL-MCNC: 0.54 MG/DL (ref 0.52–1.04)
DIFFERENTIAL METHOD BLD: ABNORMAL
EOSINOPHIL # BLD AUTO: 0.1 10E9/L (ref 0–0.7)
EOSINOPHIL NFR BLD AUTO: 1.1 %
ERYTHROCYTE [DISTWIDTH] IN BLOOD BY AUTOMATED COUNT: 12.1 % (ref 10–15)
GFR SERPL CREATININE-BSD FRML MDRD: 90 ML/MIN/{1.73_M2}
GLUCOSE SERPL-MCNC: 120 MG/DL (ref 70–99)
GLUCOSE UR STRIP-MCNC: NEGATIVE MG/DL
HCT VFR BLD AUTO: 37.8 % (ref 35–47)
HGB BLD-MCNC: 12.8 G/DL (ref 11.7–15.7)
HGB UR QL STRIP: NEGATIVE
IMM GRANULOCYTES # BLD: 0 10E9/L (ref 0–0.4)
IMM GRANULOCYTES NFR BLD: 0 %
KETONES UR STRIP-MCNC: NEGATIVE MG/DL
LEUKOCYTE ESTERASE UR QL STRIP: NEGATIVE
LYMPHOCYTES # BLD AUTO: 1.6 10E9/L (ref 0.8–5.3)
LYMPHOCYTES NFR BLD AUTO: 29.4 %
MCH RBC QN AUTO: 33.2 PG (ref 26.5–33)
MCHC RBC AUTO-ENTMCNC: 33.9 G/DL (ref 31.5–36.5)
MCV RBC AUTO: 98 FL (ref 78–100)
MONOCYTES # BLD AUTO: 0.4 10E9/L (ref 0–1.3)
MONOCYTES NFR BLD AUTO: 7.8 %
NEUTROPHILS # BLD AUTO: 3.3 10E9/L (ref 1.6–8.3)
NEUTROPHILS NFR BLD AUTO: 60.8 %
NITRATE UR QL: NEGATIVE
NRBC # BLD AUTO: 0 10*3/UL
NRBC BLD AUTO-RTO: 0 /100
PH UR STRIP: 7 PH (ref 5–7)
PLATELET # BLD AUTO: 190 10E9/L (ref 150–450)
POTASSIUM SERPL-SCNC: 3.7 MMOL/L (ref 3.4–5.3)
PROT SERPL-MCNC: 7.2 G/DL (ref 6.8–8.8)
RBC # BLD AUTO: 3.86 10E12/L (ref 3.8–5.2)
RBC #/AREA URNS AUTO: <1 /HPF (ref 0–2)
SODIUM SERPL-SCNC: 140 MMOL/L (ref 133–144)
SOURCE: NORMAL
SP GR UR STRIP: 1.01 (ref 1–1.03)
SQUAMOUS #/AREA URNS AUTO: <1 /HPF (ref 0–1)
UROBILINOGEN UR STRIP-MCNC: 0 MG/DL (ref 0–2)
WBC # BLD AUTO: 5.5 10E9/L (ref 4–11)
WBC #/AREA URNS AUTO: <1 /HPF (ref 0–5)

## 2019-03-02 PROCEDURE — 25000132 ZZH RX MED GY IP 250 OP 250 PS 637: Mod: GY | Performed by: EMERGENCY MEDICINE

## 2019-03-02 PROCEDURE — 96375 TX/PRO/DX INJ NEW DRUG ADDON: CPT | Performed by: FAMILY MEDICINE

## 2019-03-02 PROCEDURE — 99285 EMERGENCY DEPT VISIT HI MDM: CPT | Mod: Z6 | Performed by: FAMILY MEDICINE

## 2019-03-02 PROCEDURE — A9270 NON-COVERED ITEM OR SERVICE: HCPCS | Mod: GY | Performed by: FAMILY MEDICINE

## 2019-03-02 PROCEDURE — 25000128 H RX IP 250 OP 636: Performed by: FAMILY MEDICINE

## 2019-03-02 PROCEDURE — 25000131 ZZH RX MED GY IP 250 OP 636 PS 637: Mod: GY | Performed by: FAMILY MEDICINE

## 2019-03-02 PROCEDURE — 96374 THER/PROPH/DIAG INJ IV PUSH: CPT | Mod: 91 | Performed by: FAMILY MEDICINE

## 2019-03-02 PROCEDURE — 25000125 ZZHC RX 250: Performed by: FAMILY MEDICINE

## 2019-03-02 PROCEDURE — 81001 URINALYSIS AUTO W/SCOPE: CPT | Performed by: FAMILY MEDICINE

## 2019-03-02 PROCEDURE — 25000128 H RX IP 250 OP 636: Performed by: EMERGENCY MEDICINE

## 2019-03-02 PROCEDURE — 25000132 ZZH RX MED GY IP 250 OP 250 PS 637: Mod: GY | Performed by: FAMILY MEDICINE

## 2019-03-02 PROCEDURE — 99285 EMERGENCY DEPT VISIT HI MDM: CPT | Mod: 25 | Performed by: FAMILY MEDICINE

## 2019-03-02 PROCEDURE — 85025 COMPLETE CBC W/AUTO DIFF WBC: CPT | Performed by: FAMILY MEDICINE

## 2019-03-02 PROCEDURE — 74177 CT ABD & PELVIS W/CONTRAST: CPT

## 2019-03-02 PROCEDURE — 80053 COMPREHEN METABOLIC PANEL: CPT | Performed by: FAMILY MEDICINE

## 2019-03-02 PROCEDURE — 96361 HYDRATE IV INFUSION ADD-ON: CPT | Performed by: FAMILY MEDICINE

## 2019-03-02 RX ORDER — ONDANSETRON 2 MG/ML
4 INJECTION INTRAMUSCULAR; INTRAVENOUS
Status: DISCONTINUED | OUTPATIENT
Start: 2019-03-02 | End: 2019-03-02 | Stop reason: HOSPADM

## 2019-03-02 RX ORDER — FENTANYL CITRATE 50 UG/ML
75 INJECTION, SOLUTION INTRAMUSCULAR; INTRAVENOUS ONCE
Status: DISCONTINUED | OUTPATIENT
Start: 2019-03-02 | End: 2019-03-02

## 2019-03-02 RX ORDER — OMEPRAZOLE 20 MG/1
20 TABLET, DELAYED RELEASE ORAL
COMMUNITY
End: 2019-07-06

## 2019-03-02 RX ORDER — MAGNESIUM CARB/ALUMINUM HYDROX 105-160MG
296 TABLET,CHEWABLE ORAL ONCE
Status: COMPLETED | OUTPATIENT
Start: 2019-03-02 | End: 2019-03-02

## 2019-03-02 RX ORDER — LOSARTAN POTASSIUM 50 MG/1
50 TABLET ORAL DAILY
COMMUNITY
Start: 2019-02-26 | End: 2019-05-11

## 2019-03-02 RX ORDER — FENTANYL CITRATE 50 UG/ML
50 INJECTION, SOLUTION INTRAMUSCULAR; INTRAVENOUS ONCE
Status: COMPLETED | OUTPATIENT
Start: 2019-03-02 | End: 2019-03-02

## 2019-03-02 RX ORDER — CITALOPRAM HYDROBROMIDE 20 MG/1
20 TABLET ORAL DAILY
COMMUNITY
End: 2019-03-05

## 2019-03-02 RX ORDER — KETOROLAC TROMETHAMINE 15 MG/ML
15 INJECTION, SOLUTION INTRAMUSCULAR; INTRAVENOUS ONCE
Status: COMPLETED | OUTPATIENT
Start: 2019-03-02 | End: 2019-03-02

## 2019-03-02 RX ORDER — MULTIVITAMIN,THERAPEUTIC
1 TABLET ORAL DAILY
COMMUNITY
End: 2019-05-11

## 2019-03-02 RX ORDER — ONDANSETRON 4 MG/1
4 TABLET, ORALLY DISINTEGRATING ORAL ONCE
Status: COMPLETED | OUTPATIENT
Start: 2019-03-02 | End: 2019-03-02

## 2019-03-02 RX ORDER — LORAZEPAM 2 MG/ML
0.5 INJECTION INTRAMUSCULAR ONCE
Status: COMPLETED | OUTPATIENT
Start: 2019-03-02 | End: 2019-03-02

## 2019-03-02 RX ORDER — IOPAMIDOL 755 MG/ML
72 INJECTION, SOLUTION INTRAVASCULAR ONCE
Status: COMPLETED | OUTPATIENT
Start: 2019-03-02 | End: 2019-03-02

## 2019-03-02 RX ORDER — BETAMETHASONE DIPROPIONATE 0.5 MG/G
CREAM TOPICAL 2 TIMES DAILY PRN
COMMUNITY
End: 2020-06-15

## 2019-03-02 RX ORDER — BISACODYL 10 MG
5 SUPPOSITORY, RECTAL RECTAL DAILY
COMMUNITY
End: 2020-02-04

## 2019-03-02 RX ORDER — TRIAMCINOLONE ACETONIDE 1 MG/G
CREAM TOPICAL 2 TIMES DAILY PRN
COMMUNITY
End: 2019-03-12

## 2019-03-02 RX ORDER — HYDROMORPHONE HYDROCHLORIDE 1 MG/ML
0.3 INJECTION, SOLUTION INTRAMUSCULAR; INTRAVENOUS; SUBCUTANEOUS ONCE
Status: COMPLETED | OUTPATIENT
Start: 2019-03-02 | End: 2019-03-02

## 2019-03-02 RX ADMIN — Medication 0.3 MG: at 17:00

## 2019-03-02 RX ADMIN — FENTANYL CITRATE 50 MCG: 50 INJECTION, SOLUTION INTRAMUSCULAR; INTRAVENOUS at 18:34

## 2019-03-02 RX ADMIN — SODIUM CHLORIDE 500 ML: 9 INJECTION, SOLUTION INTRAVENOUS at 15:50

## 2019-03-02 RX ADMIN — IOPAMIDOL 72 ML: 755 INJECTION, SOLUTION INTRAVENOUS at 16:42

## 2019-03-02 RX ADMIN — MAGNESIUM CITRATE 296 ML: 1.75 LIQUID ORAL at 18:38

## 2019-03-02 RX ADMIN — ONDANSETRON 4 MG: 4 TABLET, ORALLY DISINTEGRATING ORAL at 14:48

## 2019-03-02 RX ADMIN — LORAZEPAM 0.5 MG: 2 INJECTION INTRAMUSCULAR; INTRAVENOUS at 17:45

## 2019-03-02 RX ADMIN — SODIUM CHLORIDE 58 ML: 9 INJECTION, SOLUTION INTRAVENOUS at 16:42

## 2019-03-02 RX ADMIN — DOCUSATE SODIUM 286 ML: 50 LIQUID ORAL at 15:18

## 2019-03-02 RX ADMIN — KETOROLAC TROMETHAMINE 15 MG: 15 INJECTION, SOLUTION INTRAMUSCULAR; INTRAVENOUS at 15:49

## 2019-03-02 ASSESSMENT — MIFFLIN-ST. JEOR: SCORE: 1167.61

## 2019-03-02 NOTE — ED PROVIDER NOTES
HPI  Current medications, past medical history, and social history are reviewed.    The patient is a 79-year-old female presenting with concerns for constipation.  She has had a known history of constipation in the recent past.  This has seemed to become exacerbated since starting narcotic medication for low back pain.  She takes a number of medicines on a regular basis to try to prevent constipation.  She continues to have difficulty with that and has had to take an enema for relief.  Currently, she complains of gradually worsened discomfort including bloating of her abdomen over the last 2 weeks.  She has had very little if any stool output during this time.  She has had nausea intermittently.  No vomiting reported.  No fever.  No urinary symptoms.  No vaginal symptoms.  No trauma or injury.  She tells me that she feels constipated and needs help with relief.    ROS: All other review of systems are negative other than that noted above.     Past Medical History:   Diagnosis Date     Adrenal adenoma     stable, thought not to be hormonally active     Arthritis      Depressive disorder      Hypertension      Rheumatic fever     thought to have had as a child     Small bowel obstruction (H)      Thyroid disease      Past Surgical History:   Procedure Laterality Date     AAA REPAIR  2015    wt bifurcation graft     CARPAL TUNNEL RELEASE RT/LT Bilateral 2013     HYSTERECTOMY  2013     JOINT REPLACEMENT Right 2003     LAPAROSCOPIC CHOLECYSTECTOMY N/A 12/12/2018    Procedure: LAPAROSCOPIC CHOLECYSTECTOMY;  Surgeon: Amadeo Sebastian MD;  Location: [facility]     ORTHOPEDIC SURGERY       SPINE SURGERY  1981    cervical spine fusion     THYROIDECTOMY  2011     Medicines    acetaminophen (TYLENOL) 500 MG tablet   betamethasone dipropionate (DIPROSONE) 0.05 % external cream   bisacodyl (DULCOLAX) 10 MG suppository   calcium carbonate (TUMS) 500 MG chewable tablet   carbidopa-levodopa (SINEMET)  MG per tablet  "  cholecalciferol (VITAMIN D3) 1000 units (25 mcg) capsule   citalopram (CELEXA) 20 MG tablet   gabapentin (NEURONTIN) 100 MG capsule   HYDROcodone-acetaminophen (NORCO) 5-325 MG tablet   lactulose (CEPHULAC) 20 GM packet   levothyroxine (SYNTHROID/LEVOTHROID) 137 MCG tablet   losartan (COZAAR) 50 MG tablet   multivitamin, therapeutic (THERA-VIT) TABS tablet   omeprazole (PRILOSEC OTC) 20 MG EC tablet   polyethylene glycol (MIRALAX) powder   ranitidine (ZANTAC) 150 MG capsule   senna (SENOKOT) 8.6 MG tablet   triamcinolone (KENALOG) 0.1 % external cream   zinc oxide 16 % (BOUDREAUXS BUTT PASTE) PSTE paste   diclofenac (VOLTAREN) 1 % topical gel   polyethylene glycol 0.4%- propylene glycol 0.3% (SYSTANE ULTRA) 0.4-0.3 % SOLN ophthalmic solution     Family History   Problem Relation Age of Onset     Hypertension Mother      Coronary Artery Disease Mother      Coronary Artery Disease Father      Other Cancer Brother      Parkinsonism Other      Social History     Tobacco Use     Smoking status: Former Smoker     Packs/day: 0.50     Types: Cigarettes     Last attempt to quit: 1994     Years since quittin.2     Smokeless tobacco: Never Used   Substance Use Topics     Alcohol use: No     Drug use: No         PHYSICAL  /90   Pulse 77   Temp 98.3  F (36.8  C) (Oral)   Resp 16   Ht 1.676 m (5' 6\")   Wt 67.6 kg (149 lb)   SpO2 97%   BMI 24.05 kg/m    General: Patient is alert and in mild distress.  Conversational, sitting up in bed.  Neurological: Alert.  Moving upper and lower extremities equally, bilaterally.  Head / Neck: Atraumatic.  Ears: Not done.  Eyes: Pupils are equal, round, and reactive.  Normal conjunctiva.  Nose: Midline.  No epistaxis.  Mouth / Throat: No ulcerations or lesions.  Upper pharynx is not erythematous.  Moist.  Respiratory: No respiratory distress. CTA B.  Cardiovascular: Regular rhythm.  Peripheral extremities are warm.  No edema.  No calf tenderness.  Abdomen / Pelvis: Not " tender.  Mild distention.  Soft throughout.  Genitalia: Not done.  Musculoskeletal: No tenderness over major muscles and joints.  Skin: No evidence of rash or trauma.        ED COURSE  1503.  The patient has progressively worsened abdominal pain and nausea over the past 2 weeks.  She has a known history of constipation and continues to use narcotic medication.  I suspect she is currently constipated and suffering symptoms that are associated with it.  Low concern for new intra-abdominal process requiring blood workup or imaging.  The patient is agreeing with this assessment.  She would like to try a pink lady enema to elicit a bowel movement.    Labs Ordered and Resulted from Time of ED Arrival Up to the Time of Departure from the ED   CBC WITH PLATELETS DIFFERENTIAL - Abnormal; Notable for the following components:       Result Value    MCH 33.2 (*)     All other components within normal limits   COMPREHENSIVE METABOLIC PANEL - Abnormal; Notable for the following components:    Carbon Dioxide 33 (*)     Glucose 120 (*)     All other components within normal limits   ROUTINE UA WITH MICROSCOPIC REFLEX TO CULTURE       1606.  CT scan pending.  Blood work thus far unremarkable.  Signed out to Dr. Mccloud.    Medications   pink lady enema (286 mLs Rectal Given 3/2/19 1518)   ondansetron (ZOFRAN-ODT) ODT tab 4 mg (4 mg Oral Given 3/2/19 1448)   0.9% sodium chloride BOLUS (0 mLs Intravenous Stopped 3/2/19 1838)   ketorolac (TORADOL) injection 15 mg (15 mg Intravenous Given 3/2/19 1549)   iopamidol (ISOVUE-370) solution 72 mL (72 mLs Intravenous Given 3/2/19 1642)   sodium chloride 0.9 % bag 500mL for CT scan flush use (58 mLs As instructed Given 3/2/19 1642)   HYDROmorphone (PF) (DILAUDID) injection 0.3 mg (0.3 mg Intravenous Given 3/2/19 1700)   LORazepam (ATIVAN) injection 0.5 mg (0.5 mg Intravenous Given 3/2/19 1745)   magnesium citrate solution 296 mL (296 mLs Oral Given 3/2/19 1838)   fentaNYL (PF) (SUBLIMAZE)  injection 50 mcg (50 mcg Intravenous Given 3/2/19 1834)       IMPRESSION    ICD-10-CM    1. Drug-induced constipation K59.03 CBC with platelets, differential     Comprehensive metabolic panel   2. Hypertension, unspecified type I10          Critical Care time:  none                    Jasbir Singh MD  03/05/19 [id number]

## 2019-03-02 NOTE — ED AVS SNAPSHOT
[facility]  [address] [address]  Phone:  [phone]  Fax:  [phone]                                    Mehreen Camara   MRN: [id number]    Department:  [facility]   Date of Visit:  3/2/2019           After Visit Summary Signature Page    I have received my discharge instructions, and my questions have been answered. I have discussed any challenges I see with this plan with the nurse or doctor.    ..........................................................................................................................................  Patient/Patient Representative Signature      ..........................................................................................................................................  Patient Representative Print Name and Relationship to Patient    ..................................................               ................................................  Date                                   Time    ..........................................................................................................................................  Reviewed by Signature/Title    ...................................................              ..............................................  Date                                               Time          22EPIC Rev 08/18

## 2019-03-02 NOTE — DISCHARGE INSTRUCTIONS
"Return to the Emergency Room if the following occurs:     Severely worsened pain, fever >101, dehydration, or for any concern at anytime.    Or, follow-up with the following provider as we discussed:     Return to your primary doctor this week for reevaluation.    Medications discussed:    Lactulose for constipation.  Take this as directed starting today to tomorrow.  This should induce a bowel movement and \"clean-out.\"  Once constipation is relieved, use the MiraLax 17 g 1-4 times a day with the goal of having an easy, soft stool on either a daily or every-other-day basis.  If this is not helping, use the Lactulose in addition to induce a BM and discuss constipation management further with your doctor at the time of follow up.    If you received pain-relieving or sedating medication during your time in the ER, avoid alcohol, driving automobiles, or working with machinery.  Also, a responsible adult must stay with you.        Call the Nurse Advice Line at [phone] or [phone] for any concern at anytime.    "

## 2019-03-02 NOTE — ED NOTES
Small amount of soft stool obtained with Pink Lady Enema   Has not relieved abdominal pain  Leg pain increased with movement of up and down off of bed. Patient complains constantly of pain in back and abdomin   Skin intact    Patient B/P fluctuates

## 2019-03-05 ENCOUNTER — ASSISTED LIVING VISIT (OUTPATIENT)
Dept: GERIATRICS | Facility: CLINIC | Age: 79
End: 2019-03-05
Payer: COMMERCIAL

## 2019-03-05 ENCOUNTER — OFFICE VISIT (OUTPATIENT)
Dept: PHARMACY | Facility: CLINIC | Age: 79
End: 2019-03-05
Payer: COMMERCIAL

## 2019-03-05 VITALS
SYSTOLIC BLOOD PRESSURE: 150 MMHG | TEMPERATURE: 97.7 F | WEIGHT: 150.6 LBS | HEART RATE: 76 BPM | BODY MASS INDEX: 24.31 KG/M2 | DIASTOLIC BLOOD PRESSURE: 77 MMHG | OXYGEN SATURATION: 98 % | RESPIRATION RATE: 18 BRPM

## 2019-03-05 DIAGNOSIS — K59.01 SLOW TRANSIT CONSTIPATION: ICD-10-CM

## 2019-03-05 DIAGNOSIS — I95.1 ORTHOSTATIC HYPOTENSION DYSAUTONOMIC SYNDROME: ICD-10-CM

## 2019-03-05 DIAGNOSIS — G90.3 MULTIPLE SYSTEM ATROPHY (H): ICD-10-CM

## 2019-03-05 DIAGNOSIS — R52 PAIN: ICD-10-CM

## 2019-03-05 DIAGNOSIS — K21.9 GASTROESOPHAGEAL REFLUX DISEASE, ESOPHAGITIS PRESENCE NOT SPECIFIED: ICD-10-CM

## 2019-03-05 DIAGNOSIS — G23.8 MULTIPLE SYSTEM ATROPHY (H): ICD-10-CM

## 2019-03-05 DIAGNOSIS — F33.9 EPISODE OF RECURRENT MAJOR DEPRESSIVE DISORDER, UNSPECIFIED DEPRESSION EPISODE SEVERITY (H): ICD-10-CM

## 2019-03-05 DIAGNOSIS — K21.9 GASTROESOPHAGEAL REFLUX DISEASE, ESOPHAGITIS PRESENCE NOT SPECIFIED: Primary | ICD-10-CM

## 2019-03-05 DIAGNOSIS — G47.01 INSOMNIA DUE TO MEDICAL CONDITION: ICD-10-CM

## 2019-03-05 DIAGNOSIS — K59.04 CHRONIC IDIOPATHIC CONSTIPATION: ICD-10-CM

## 2019-03-05 DIAGNOSIS — G89.29 CHRONIC BILATERAL LOW BACK PAIN WITH BILATERAL SCIATICA: ICD-10-CM

## 2019-03-05 DIAGNOSIS — F43.23 ADJUSTMENT DISORDER WITH MIXED ANXIETY AND DEPRESSED MOOD: ICD-10-CM

## 2019-03-05 DIAGNOSIS — E03.9 HYPOTHYROIDISM, UNSPECIFIED TYPE: ICD-10-CM

## 2019-03-05 DIAGNOSIS — K22.70 BARRETT'S ESOPHAGUS WITHOUT DYSPLASIA: ICD-10-CM

## 2019-03-05 DIAGNOSIS — G90.3 MULTIPLE SYSTEM ATROPHY (H): Primary | ICD-10-CM

## 2019-03-05 DIAGNOSIS — E63.9 NUTRITIONAL DEFICIENCY: ICD-10-CM

## 2019-03-05 DIAGNOSIS — G23.8 MULTIPLE SYSTEM ATROPHY (H): Primary | ICD-10-CM

## 2019-03-05 DIAGNOSIS — I10 ESSENTIAL HYPERTENSION: ICD-10-CM

## 2019-03-05 DIAGNOSIS — F41.9 ANXIETY: ICD-10-CM

## 2019-03-05 DIAGNOSIS — M54.42 CHRONIC BILATERAL LOW BACK PAIN WITH BILATERAL SCIATICA: ICD-10-CM

## 2019-03-05 DIAGNOSIS — M54.41 CHRONIC BILATERAL LOW BACK PAIN WITH BILATERAL SCIATICA: ICD-10-CM

## 2019-03-05 PROCEDURE — 99605 MTMS BY PHARM NP 15 MIN: CPT | Performed by: PHARMACIST

## 2019-03-05 PROCEDURE — 99607 MTMS BY PHARM ADDL 15 MIN: CPT | Performed by: PHARMACIST

## 2019-03-05 PROCEDURE — 99207 ZZC CDG-MDM COMPONENT: MEETS LOW - DOWN CODED: CPT | Performed by: NURSE PRACTITIONER

## 2019-03-05 RX ORDER — AMOXICILLIN 250 MG
2 CAPSULE ORAL EVERY MORNING
COMMUNITY
End: 2019-03-17

## 2019-03-05 RX ORDER — SENNA AND DOCUSATE SODIUM 50; 8.6 MG/1; MG/1
1 TABLET, FILM COATED ORAL 2 TIMES DAILY PRN
Refills: 0 | Status: CANCELLED
Start: 2019-03-05

## 2019-03-05 RX ORDER — SIMETHICONE 125 MG
125 TABLET,CHEWABLE ORAL 4 TIMES DAILY PRN
COMMUNITY
End: 2019-03-12

## 2019-03-05 RX ORDER — SENNA AND DOCUSATE SODIUM 50; 8.6 MG/1; MG/1
1 TABLET, FILM COATED ORAL AT BEDTIME
Qty: 30 TABLET | Refills: 0 | Status: CANCELLED
Start: 2019-03-05

## 2019-03-05 RX ORDER — HYDROCODONE BITARTRATE AND ACETAMINOPHEN 5; 325 MG/1; MG/1
1 TABLET ORAL EVERY 6 HOURS PRN
COMMUNITY
End: 2019-03-12

## 2019-03-05 RX ORDER — DULOXETIN HYDROCHLORIDE 30 MG/1
30 CAPSULE, DELAYED RELEASE ORAL DAILY
COMMUNITY
End: 2019-03-12

## 2019-03-05 NOTE — PROGRESS NOTES
[facility]    Chief Complaint   Patient presents with     RECHECK       HPI:    Mehreen Camara is a 79 year old  (1940), who is being seen today for an episodic care visit at [facility]. Today's concern is:     Gastroesophageal reflux disease, esophagitis presence not specified  Chronic bilateral low back pain with bilateral sciatica  Episode of recurrent major depressive disorder, unspecified depression episode severity (H)  Adjustment disorder with mixed anxiety and depressed mood  Essential hypertension  Multiple system atrophy (H)  Chronic idiopathic constipation     Patient reports she was in the ED over the weekend due to constipation and reports ongoing constipation difficulty, she does have history of IBS.  She reports she has ongoing difficulty with BP, medications being adjusted and has been seen by cardiology. She has been seen in follow up by neurology and medications adjusted there as well.      ALLERGIES: Penicillins; Aspirin; Atenolol; Atorvastatin; Bupropion; Clindamycin; Codeine; Ibuprofen; Lovastatin; and Cephalexin  Past Medical, Surgical, Family and Social History reviewed and updated in [facility].    Current Outpatient Medications   Medication Sig Dispense Refill     acetaminophen (TYLENOL) 500 MG tablet Take 1-2 tablets (500-1,000 mg) by mouth 3 times daily as needed for mild pain  0     acetaminophen (TYLENOL) 500 MG tablet Take 1 tablet (500 mg) by mouth 3 times daily (Patient taking differently: Take 500 mg by mouth 3 times daily And 1-2 tablets (500-1000mg) three times daily as needed (max of 3000mg/day)) 90 tablet 1     betamethasone dipropionate (DIPROSONE) 0.05 % external cream Apply topically 2 times daily as needed       bisacodyl (DULCOLAX) 10 MG suppository Place 10 mg rectally every 3 days As needed for constipation       calcium carbonate (TUMS) 500 MG chewable tablet Take 1 tablet (500 mg) by mouth every 2 hours as needed for heartburn 150  tablet 1     carbidopa-levodopa (SINEMET)  MG per tablet Take 2 tablets by mouth 3 times daily 180 tablet 1     cholecalciferol (VITAMIN D3) 1000 units (25 mcg) capsule Take 2 capsules by mouth daily       diclofenac (VOLTAREN) 1 % topical gel Apply 4 g topically 4 times daily as needed for moderate pain (to back and neck) 100 g 11     DULoxetine (CYMBALTA) 30 MG capsule Take 1 capsule (30 mg) by mouth daily  0     gabapentin (NEURONTIN) 100 MG capsule Take 2 capsules (200 mg) by mouth 3 times daily 90 capsule 5     HYDROcodone-acetaminophen (NORCO) 5-325 MG tablet Take 1 tablet by mouth every 6 hours as needed for severe pain 60 tablet 0     lactulose (CEPHULAC) 20 GM packet Take 1 packet (20 g) by mouth 3 times daily as needed for constipation 21 each 0     levothyroxine (SYNTHROID/LEVOTHROID) 137 MCG tablet Take 1 tablet (137 mcg) by mouth daily 90 tablet 3     losartan (COZAAR) 50 MG tablet Take 50 mg by mouth daily       multivitamin, therapeutic (THERA-VIT) TABS tablet Take 1 tablet by mouth daily       omeprazole (PRILOSEC OTC) 20 MG EC tablet Take 20 mg by mouth 2 times daily       polyethylene glycol (MIRALAX) powder Take 17 g by mouth daily (Patient taking differently: Take 17 g by mouth daily And 8.5gm (1/2 capful) daily at 2pm) 510 g 1     polyethylene glycol 0.4%- propylene glycol 0.3% (SYSTANE ULTRA) 0.4-0.3 % SOLN ophthalmic solution Place 1 drop into both eyes 4 times daily as needed for dry eyes (Patient taking differently: Place 2 drops into both eyes 4 times daily as needed for dry eyes )       ranitidine (ZANTAC) 150 MG tablet Take 1 tablet (150 mg) by mouth At Bedtime  0     SENNA-docusate sodium (SENNA S) 8.6-50 MG tablet Take 2 tablets by mouth every morning AND 1 tablet At Bedtime. May also take 1 tablet 2 times daily as needed (constipation). 60 tablet 0     simethicone (MYLICON) 125 MG chewable tablet Take 1 tablet (125 mg) by mouth 4 times daily  0     triamcinolone (KENALOG) 0.1 %  external cream Apply topically 2 times daily as needed for irritation       DULoxetine (CYMBALTA) 30 MG capsule Take 30 mg by mouth daily       HYDROcodone-acetaminophen (NORCO) 5-325 MG tablet Take 1 tablet by mouth every 6 hours as needed        ranitidine (ZANTAC) 150 MG tablet Take 150 mg by mouth At Bedtime       senna-docusate (SENOKOT-S/PERICOLACE) 8.6-50 MG tablet Take 2 tablets by mouth every morning And 1 tablet every evening. May also take 1 tablet twice daily as needed.       simethicone (MYLICON) 125 MG chewable tablet Take 125 mg by mouth 4 times daily as needed for intestinal gas       Zinc Oxide (DESITIN) 13 % CREA Twice daily to rashy area until resolved         REVIEW OF SYSTEMS:  4 point ROS including Respiratory, CV, GI and , other than that noted in the HPI,  is negative    Physical Exam:  /77   Pulse 76   Temp 97.7  F (36.5  C)   Resp 18   Wt 68.3 kg (150 lb 9.6 oz)   SpO2 98%   BMI 24.31 kg/m    GENERAL APPEARANCE:  Alert, in no acute distress   HEAD:  Normal, normocephalic, atraumatic  EYE EXAM: normal external eye, conjunctiva, lids, JONO  NECK EXAM: supple, no JVD  CHEST/RESP:  respiratory effort normal , no respiratory distress  CV:  trace peripheral edema LE  M/S:   extremities normal, gait normal-with rolling walker for longer distances, normal muscle tone, and range of motion normal   NEUROLOGIC EXAM: Normal gross motor movement, tone and coordination. No tremor. Cranial nerves 2-12 are normal tested and grossly at patient's baseline  PSYCH:  Alert and oriented to person-place-time , affect pleasant , judgement impaired by anxiety     Recent Labs:  Reviewed in records.     Assessment/Plan:  Gastroesophageal reflux disease, esophagitis presence not specified  Chronic and ongoing belching, GI distress. Reviewed with pharmacist and with patient. See changes below which indicate minor changes in orders to better accommodate chronic symptoms. Will decrease zantac to once daily,  per pharmacy recommendations.   - simethicone (MYLICON) 125 MG chewable tablet; Take 1 tablet (125 mg) by mouth 4 times daily  - ranitidine (ZANTAC) 150 MG tablet; Take 1 tablet (150 mg) by mouth At Bedtime    Chronic bilateral low back pain with bilateral sciatica  Ongoing and chronic, she would like to stop taking Norco, but does continue to feel pain.  Will adjust Norco to prn, encouraged her only to take when pain is severe.  Duloxetine added which may help with neuropathic pain, could increase this if needed.  Goal to get off norco and gabapentin.  She does have referral for pain management clinic, but no appointment yet.   - acetaminophen (TYLENOL) 500 MG tablet; Take 1-2 tablets (500-1,000 mg) by mouth 3 times daily as needed for mild pain  - HYDROcodone-acetaminophen (NORCO) 5-325 MG tablet; Take 1 tablet by mouth every 6 hours as needed for severe pain  - DULoxetine (CYMBALTA) 30 MG capsule; Take 1 capsule (30 mg) by mouth daily    Episode of recurrent major depressive disorder, unspecified depression episode severity (H)  Adjustment disorder with mixed anxiety and depressed mood  Currently on celexa, which has been less than helpful.  She still feels anxiety and depression, anxiety is debilitating to her.  Reviewed with pharmacist on site today, and Mehreen agrees with switch to duloxetine to allow for better pain control, neuropathy management, and anxiety/depression control.   - DULoxetine (CYMBALTA) 30 MG capsule; Take 1 capsule (30 mg) by mouth daily    Essential hypertension  Has been seen by cardiology and agree with recommendations.  Amlodipine discontinued, losartan dose adjusted.  BP since cardiology appointment on 2/26/19 has been 160s/70-90 supine and /60-80 standing.  Still has significant difficulty with orthostasis.  May benefit from compression to LE (prefer to waist, but she refuses this), so will recommend OT eval for compression and sock aid/device to allow her to get compression  stockings on/off independently.  She is not thrilled about this, but willing to try to improve BP/orthostasis.     Multiple system atrophy (H)  Chronic, seeing neurology.  On sinemet, and dose has been adjusted.  Walking fairly well, but sinemet could also be contributing to orthostasis.  The current medical regimen is effective;  continue present plan and medications,     Chronic idiopathic constipation  She was in the ED this last weekend due to constipation.  She was prescribed lactulose TID PRN there.  She reports she has had some BMs, but small/hard and still feels full. She continues to take the lactulose, and will take TID til BM soft.  - SENNA-docusate sodium (SENNA S) 8.6-50 MG tablet; Take 2 tablets by mouth every morning AND 1 tablet At Bedtime. May also take 1 tablet 2 times daily as needed (constipation).    Future Appointments   Date Time Provider Department Center   5/14/2019  2:30 PM Chris Monterroso MD [facility]        Orders transcribed as written at assisted living facility:  1. discontinue Ranitidine 150 BID  2. Ranitidine 150 at bedtime PO Dx: GERD  3. discontinue Celexa  4. Start Cymbalta(duloxetine) 30 mg every day PO Dx: Depression  5. discontinue Scheduled Noro 5/325 every day  6. Change Current Norco(hydrocodone/Apap 5/325) to 1 tab Q6 hours PRN PO Dx: Pain  7. Continue Lactulose 20 g TID til stool soft. Then change to TID PRN Dx: Constipation  8. Ok for OT to evaluate for compression garments socks and aids for application Dx: Orthostatic hypotenstion    Electronically signed by  HAILEE Contreras CNP

## 2019-03-05 NOTE — PROGRESS NOTES
"SUBJECTIVE/OBJECTIVE:                           Mehreen Camara is a 79 year old female seen at their home for an initial visit for Medication Therapy Management.  She was referred to me from Cathy Sargent NP.      Allergies/ADRs: Reviewed in Epic  Tobacco: History of tobacco dependence - quit 1994  Alcohol: not currently using  Caffeine: 2 cups/day of coffee  Activity: uses a walker for longer distances  PMH: Reviewed in Epic    Medication Adherence/Access:  no issues reported  Patient has assistance with medication administration: assisted living.    Pain: Pt with h/o chronic pain; notes OA pain, lower back pain.  Reports difficulty sitting due to the back pain.  Also notes sheets hurting her at night/neuropathy.  Current medication includes Gabapentin 200mg tid, Tylenol 500mg tid and 500-1000mg tid prn (with max 3000mg daily noted), Norco 5/325mg 1 tab in the evening & bid as needed (noted max 4000mg tylenol daily).  Also has Diclofenac gel qid prn available for back and neck.  Pain quite bothersome to her today, and asks if something can help with it. Reports Tylenol helps \"take the edge off.\"  Has been requesting additional prn Norco in addition to scheduled dose (taking in am, lunch, and at 6:30pm).  Notes the Norco was initiated following gallblader removal.  States she doesn't like taking narcotics this long; has been on shorter courses in past. Potential medication side effects she is experiencing: dizziness, fatigue, constipation, depression.    Multiple system atrophy/Orthostatic hypotension: Sees neurology.  Currently prescribed Sinemet 25/100 - 2 tablets tid.  Notes that she has been on 1.5 tabs in the past, and believes symptoms improved with 2 tabs tid, not as \"weak\".  Notes h/o freezing gait prior to starting the Sinemet.  Pt saw neurology a few weeks ago, and she was tried on Pyridostigmine.  She reports she took this for 2 days and felt terribly; balance was worse.  Has visit scheduled with " "neurology in another 2 months/early May.  Prefers not to try another med for orthostasis, and notes she would rather work to reduce meds that may be contributing/that increase fall risk.  Potential medication side effects she is experiencing: orthostasis, dizziness.  Denies recent falls, although feels \"wobbly.\" Pt also notes she has been drinking approx 6-7 glasses of water daily, rises slowly to avoid dizziness.  Has a wedge to help elevate head of bed, but not much incline to it, and does not like to use this.  Is not using compression stockings.  States she feels \"trapped\" in them due to difficulty getting on and off.    Depression/anxiety/insomnia: Current medication includes Citalopram 20mg daily.  Reports tossing and turning due to neuropathy/sheets hurting her.  Feels Citalopram is ineffective, and notes anxiety \"when I don't know what's going to happen next.\"  Was started on Duloxetine in past, but decided not to take it when she realized cost.  Also tried Sertraline in past which she felt was helpful, but reports it was stopped due to provider concern that it may be contributing to her dizziness/orthostasis.  She reports she did not feel it made her dizzy.    HTN: Current medication includes Losartan 50mg qam.  This was recently changed from Amlodipine by cardiology due to potential for Amlodipine to contribute to orthostasis.  As noted above, pt with h/o orthostatic hypotension.  This has made HTN difficult to treat, and BPs quite variable.  Most recent BPs appear to range: 118-208/77-107mmHg.  Pt denies SOB, chest pain.    Constipation: Current medication includes Senna-S 2 tabs qam & 1 tab qpm & 1 tab bid prn, Lactulose 20gm tid prn, Bisacodyl supp prn, Miralax 17gm qam & 8.5gm q2pm.  Recent ED visit due to constipation which is when Lactulose was added.  Notes she took 2 doses yesterday, none yet today.  Reports she did not sleep well last night due to gi upset, significant gas, has not been successful " with eliminating stool yet.    Hypothyroidism: Patient is taking levothyroxine 137 mcg daily. TSH on 2/13/19 = 1.58.    Harris's Esophagus/GERD:  Current medication includes Omeprazole 20mg bid, Ranitidine 150mg bid, Tums prn.  Notes that she thought she was recommended to only take Ranitidine at hs; is unsure why she is taking bid.  Has been using Tums prn on occasion for GERD symptoms.    Supplements:  Current medication includes MVI, Vitamin D3 2000u daily.  Reports appetite not very good due to gi distress.    Estimated Creatinine Clearance: 91.1 mL/min (based on SCr of 0.54 mg/dL).    Today's Vitals: There were no vitals taken for this visit.      ASSESSMENT:                             Current medications were reviewed today.     Medication Adherence: good, no issues identified    Pain: May benefit from switch in current Citalopram to Duloxetine for pain.  Discussed benefit in OA and neuropathy.  May allow reduction in Norco and/or Gabapentin.  Norco may contribute to constipation, falls, depression, etc.  Note max daily dose of 4000mg tylenol appropriate given pt in controlled environment and AL administering meds.    Multiple system atrophy/Orthostatic hypotension: Sinemet felt to be at lowest effective dose by patient currently.  Discussed that this may contribute to orthostasis.  Discussed non-pharm interventions to help with orthostasis such as removal/reduction of offending meds, arising slowly, raising head of bed, compression stockings to waist, good fluid intake.  Discussed with NP and NP suggesting OT to help with fitting of stockings/aid application.  As noted below, SSRIs may contribute to orthostasis, and may be helpful to switch to Duloxetine.    Pt did not tolerate Pyridostigmine to treat OH.  Could discuss Droxidopa with neurology in future, however, pt prefers to not add another med at this time.    Depression/anxiety/insomnia: May benefit from switch to Duloxetine from Citalopram.  SSRIs may  contribute to orthostasis, and pt feels Citalopram ineffective.  As noted above, Duloxetine may also be helpful for pain.    HTN: Due to significant orthostasis due to multiple system atrophy, higher BP goal than typical; reasonable goal of 160/90mmHg.  Will continue to follow with cardiology.  Could consider moving Losartan to bedtime dosing in future since shorter acting, and could avoid daytime orthostasis.  However, pt currently awaking frequently at night, so continue am dosing for now.    Constipation: Discussed appropriate use of Lactulose, time until benefit. May be contributing to her excess gas, gi upset.  Should continue to take tid until soft stools, then prn as noted by NP.     Hypothyroidism: Stable.    Harris's Esophagus/GERD:  On both H2RA and PPI.  May benefit from reduction in Ranitidine to 150mg nightly and monitor if increased symptoms.      Supplements:  Stable.    PLAN:                            1.  Consider switch from Citalopram 20mg daily to Duloxetine 30mg daily.  Monitor mood, anxiety, pain.    2.  Consider reduction in Norco or Gabapentin dose.      3.  Please consider reduction in Ranitidine to 150mg every night at bedtime.     4.  Consider addition of compression stockings that extend to the waist.  Also consider elevation of head of the bed 10-20 degrees.  Continue drinking plenty of water.        I spent 60 minutes with this patient today. A copy of the visit note was provided to the patient's referring and cardiology and neurology providers.    Will follow up in one month over the phone, sooner if necessary.    The patient was mailed a summary of these recommendations as an after visit summary.         Lurdes Singh, Pharm.D.,[facility] [place]  Board Certified Geriatric Pharmacist  Medication Therapy Management Pharmacist  [phone]

## 2019-03-06 NOTE — PATIENT INSTRUCTIONS
Recommendations from today's MTM visit:                                                    MTM (medication therapy management) is a service provided by a clinical pharmacist designed to help you get the most of out of your medicines.   Today we reviewed what your medicines are for, how to know if they are working, that your medicines are safe and how to make your medicine regimen as easy as possible.       1.  Stop Citalopram and begin Duloxetine 30mg daily (for pain and mood/anxiety).    2.  Consider reduction in Norco or Gabapentin dose.      3.  Reduce Ranitidine dose from 150mg twice daily to 150mg nightly.    4.  Consider addition of compression stockings.  Also consider elevation of head of the bed 10-20 degrees.  Continue drinking plenty of water.    Next MTM visit: one month over the phone, sooner if necessary    To schedule another MTM appointment, please call me directly or you may call the MTM scheduling line at [phone] or toll-free at [phone].     My Clinical Pharmacist's contact information:                                                      It was a pleasure talking with you today!  Please feel free to contact me with any questions or concerns you have.      Lurdes Singh, Pharm.D.,[facility] [place]  Board Certified Geriatric Pharmacist  Medication Therapy Management Pharmacist  [phone]      You may receive a survey about the MTM services you received by email and/or US Mail.  I would appreciate your feedback to help me serve you better in the future. Your comments will be anonymous.

## 2019-03-08 RX ORDER — HYDROCODONE BITARTRATE AND ACETAMINOPHEN 5; 325 MG/1; MG/1
1 TABLET ORAL EVERY 6 HOURS PRN
Qty: 60 TABLET | Refills: 0 | Status: SHIPPED | OUTPATIENT
Start: 2019-03-08 | End: 2019-04-19

## 2019-03-08 RX ORDER — SIMETHICONE 125 MG
125 TABLET,CHEWABLE ORAL 4 TIMES DAILY
Refills: 0
Start: 2019-03-08 | End: 2019-03-12

## 2019-03-08 RX ORDER — ACETAMINOPHEN 500 MG
500-1000 TABLET ORAL 3 TIMES DAILY PRN
Refills: 0
Start: 2019-03-08 | End: 2019-08-27

## 2019-03-08 RX ORDER — SENNA AND DOCUSATE SODIUM 50; 8.6 MG/1; MG/1
TABLET, FILM COATED ORAL
Qty: 60 TABLET | Refills: 0
Start: 2019-03-08 | End: 2019-05-08

## 2019-03-08 RX ORDER — DULOXETIN HYDROCHLORIDE 30 MG/1
30 CAPSULE, DELAYED RELEASE ORAL DAILY
Refills: 0
Start: 2019-03-08 | End: 2019-05-11

## 2019-03-08 NOTE — PROGRESS NOTES
Documentation of Face-to-Face and Certification for Home Health Services     Patient: Mehreen Camara   YOB: 1940  MR Number: [id number]  Today's Date: 3/8/2019    I certify that patient: Mehreen Camara is under my care and that I, or a nurse practitioner or physician's assistant working with me, had a face-to-face encounter that meets the physician face-to-face encounter requirements with this patient on: 3/5/2019.    This encounter with the patient was in whole, or in part, for the following medical condition, which is the primary reason for home health care:     ICD-10-CM    1. Gastroesophageal reflux disease, esophagitis presence not specified K21.9 simethicone (MYLICON) 125 MG chewable tablet     ranitidine (ZANTAC) 150 MG tablet   2. Chronic bilateral low back pain with bilateral sciatica M54.42 acetaminophen (TYLENOL) 500 MG tablet    M54.41 HYDROcodone-acetaminophen (NORCO) 5-325 MG tablet    G89.29 DULoxetine (CYMBALTA) 30 MG capsule   3. Episode of recurrent major depressive disorder, unspecified depression episode severity (H) F33.9    4. Adjustment disorder with mixed anxiety and depressed mood F43.23 DULoxetine (CYMBALTA) 30 MG capsule   5. Essential hypertension I10    6. Multiple system atrophy (H) G23.9    7. Chronic idiopathic constipation K59.04 SENNA-docusate sodium (SENNA S) 8.6-50 MG tablet    .    I certify that, based on my findings, the following services are medically necessary home health services: Occupational Therapy.    My clinical findings support the need for the above services because: Occupational Therapy Services are needed to assess and treat cognitive ability and address ADL safety due to impairment in orthostatic hypotension, needs compression and aids to independently apply compression garments.    Further, I certify that my clinical findings support that this patient is homebound (i.e. absences from home require considerable and taxing effort and are  for medical reasons or [employer] services or infrequently or of short duration when for other reasons) because: Requires assistance of another person or specialized equipment to access medical services because patient:  orthostasis..    Based on the above findings. I certify that this patient is confined to the home and needs intermittent  skilled nursing care, physical therapy and/or speech therapy.  The patient is under my care, and I have initiated the establishment of the plan of care.  This patient will be followed by a physician who will periodically review the plan of care.  Physician/Provider to provide follow up care: Cathy Sargent    Responsible Medicare certified PEC[employer] Physician: HAILEE Contreras CNP   Physician Signature: See electronic signature associated with these discharge orders.  Date: 3/5/2019

## 2019-03-11 ENCOUNTER — COMMUNICATION - HEALTHEAST (OUTPATIENT)
Dept: CARDIOLOGY | Facility: CLINIC | Age: 79
End: 2019-03-11

## 2019-03-11 ENCOUNTER — AMBULATORY - HEALTHEAST (OUTPATIENT)
Dept: OTHER | Facility: CLINIC | Age: 79
End: 2019-03-11

## 2019-03-11 ENCOUNTER — DOCUMENTATION ONLY (OUTPATIENT)
Dept: OTHER | Facility: CLINIC | Age: 79
End: 2019-03-11

## 2019-03-11 ENCOUNTER — HOSPITAL ENCOUNTER (EMERGENCY)
Facility: CLINIC | Age: 79
Discharge: HOME | End: 2019-03-11
Attending: EMERGENCY MEDICINE | Admitting: EMERGENCY MEDICINE
Payer: COMMERCIAL

## 2019-03-11 ENCOUNTER — APPOINTMENT (OUTPATIENT)
Dept: GENERAL RADIOLOGY | Facility: CLINIC | Age: 79
End: 2019-03-11
Attending: EMERGENCY MEDICINE
Payer: COMMERCIAL

## 2019-03-11 VITALS
HEART RATE: 85 BPM | BODY MASS INDEX: 23.95 KG/M2 | TEMPERATURE: 98.2 F | OXYGEN SATURATION: 98 % | RESPIRATION RATE: 16 BRPM | WEIGHT: 149 LBS | SYSTOLIC BLOOD PRESSURE: 176 MMHG | DIASTOLIC BLOOD PRESSURE: 97 MMHG | HEIGHT: 66 IN

## 2019-03-11 DIAGNOSIS — I10 ESSENTIAL HYPERTENSION: ICD-10-CM

## 2019-03-11 DIAGNOSIS — K59.03 DRUG-INDUCED CONSTIPATION: ICD-10-CM

## 2019-03-11 LAB
ALBUMIN UR-MCNC: NEGATIVE MG/DL
APPEARANCE UR: CLEAR
BILIRUB UR QL STRIP: NEGATIVE
COLOR UR AUTO: YELLOW
GLUCOSE UR STRIP-MCNC: NEGATIVE MG/DL
HGB UR QL STRIP: NEGATIVE
KETONES UR STRIP-MCNC: NEGATIVE MG/DL
LEUKOCYTE ESTERASE UR QL STRIP: NEGATIVE
NITRATE UR QL: NEGATIVE
PH UR STRIP: 8 PH (ref 5–7)
SOURCE: ABNORMAL
SP GR UR STRIP: 1 (ref 1–1.03)
UROBILINOGEN UR STRIP-MCNC: 0 MG/DL (ref 0–2)

## 2019-03-11 PROCEDURE — 25000132 ZZH RX MED GY IP 250 OP 250 PS 637: Mod: GY | Performed by: EMERGENCY MEDICINE

## 2019-03-11 PROCEDURE — 81003 URINALYSIS AUTO W/O SCOPE: CPT | Performed by: EMERGENCY MEDICINE

## 2019-03-11 PROCEDURE — 99284 EMERGENCY DEPT VISIT MOD MDM: CPT

## 2019-03-11 PROCEDURE — 99284 EMERGENCY DEPT VISIT MOD MDM: CPT | Mod: Z6 | Performed by: EMERGENCY MEDICINE

## 2019-03-11 PROCEDURE — 74019 RADEX ABDOMEN 2 VIEWS: CPT

## 2019-03-11 RX ADMIN — SODIUM PHOSPHATE 1 ENEMA: 7; 19 ENEMA RECTAL at 13:40

## 2019-03-11 RX ADMIN — SODIUM PHOSPHATE 1 ENEMA: 7; 19 ENEMA RECTAL at 14:16

## 2019-03-11 ASSESSMENT — MIFFLIN-ST. JEOR: SCORE: 1167.61

## 2019-03-11 NOTE — ED NOTES
Pt had moderate amount of stool - states she feels much better - will follow up with assisted care facility to manage bowel regime

## 2019-03-11 NOTE — ED AVS SNAPSHOT
[facility] Emergency Department  [address] [address]  Phone:  [phone]  Fax:  [phone]                                    Mehreen Camara   MRN: [id number]    Department:  [facility] Emergency Department   Date of Visit:  3/11/2019           After Visit Summary Signature Page    I have received my discharge instructions, and my questions have been answered. I have discussed any challenges I see with this plan with the nurse or doctor.    ..........................................................................................................................................  Patient/Patient Representative Signature      ..........................................................................................................................................  Patient Representative Print Name and Relationship to Patient    ..................................................               ................................................  Date                                   Time    ..........................................................................................................................................  Reviewed by Signature/Title    ...................................................              ..............................................  Date                                               Time          22EPIC Rev 08/18

## 2019-03-11 NOTE — ED NOTES
"Pt here with her daughter - states, \"I was up all night\" with this abdominal pain and discomfort - was given lactulose last Friday for constipation after significant workup for this discomfort.   "

## 2019-03-11 NOTE — ED PROVIDER NOTES
History     Chief Complaint   Patient presents with     Constipation     HPI  Mehreen Camara is a 79 year old female who presents with ongoing problems with constipation.  Patient has not had a lifetime history of constipation issues.  Recently had cholecystectomy and was treated with narcotic pain medicines.  She is also on multiple other medicines that can cause constipation.  In addition patient has hypothyroidism.  They have been trying different oral meds such as senna S, lactulose, and was recently provided magnesium citrate to the emergency room with some result.  She states she has diffuse abdominal pain with associated nausea but no vomiting.  Abdominal pain does not localize to the back.  No radicular leg pain.  No urinary symptoms.  Denies chest pain or shortness of breath.  She states after the magnesium citrate she had some small hard stools.  No bloody stools.  Colonoscopy previously revealed diverticular disease and polyps.  Most recent one which the daughter thinks was 2 years ago did not show polyps.  With her recent ER visit last Sunday patient had blood work, urinalysis, and abdominal CT.  CT showed large stool throughout the entire colon.  Indeterminate left adrenal mass.  Lumbar scoliosis.    Allergies:  Allergies   Allergen Reactions     Penicillins Anaphylaxis, Rash and Shortness Of Breath     Aspirin Nausea     Atenolol Cough     Atorvastatin Muscle Pain (Myalgia)     Bupropion Nausea     Clindamycin Other (See Comments)     Constipation      Codeine Nausea     Ibuprofen Nausea     Stomach upset     Lovastatin Muscle Pain (Myalgia)     Cephalexin Rash     Neck reddness       Problem List:    Patient Active Problem List    Diagnosis Date Noted     Post-op pain 12/12/2018     Priority: Medium     S/P laparoscopic cholecystectomy 12/12/2018     Priority: Medium     Multiple system atrophy (H) 11/14/2018     Priority: Medium     Movement disorder 09/19/2018     Priority: Medium      Rheumatic aortic stenosis 09/19/2018     Priority: Medium     Disorder of bursae and tendons in shoulder region 09/18/2018     Priority: Medium     Overview:   Created by Conversion    Replacement Utility updated for latest IMO load       Adjustment disorder with anxious mood 09/17/2018     Priority: Medium     Adjustment disorder with mixed anxiety and depressed mood 09/17/2018     Priority: Medium     Aneurysm of abdominal vessel (H) 09/17/2018     Priority: Medium     Overview:   Created by Conversion  [facility] Annotation: Jun 8 2011  8:07AM - Danni Ortiz: 4.4 on 11/2013.    Needs 6-12 month u/s or CT.    Replacement Utility updated for latest IMO load       Harris's esophagus 09/17/2018     Priority: Medium     Overview:   Created by Conversion  [facility] Annotation: Feb  3 2012  8:32AM Cameron Eisenberg: Needs EGD 2/2017       Bradycardia 09/17/2018     Priority: Medium     Cataract 09/17/2018     Priority: Medium     Overview:   Created by Conversion       Major depressive disorder, recurrent episode (H) 09/17/2018     Priority: Medium     Overview:   Created by Conversion    Replacement Utility updated for latest IMO load       Constipation 09/17/2018     Priority: Medium     Dizziness 09/17/2018     Priority: Medium     Overview:   Created by Conversion       Dyslipidemia 09/17/2018     Priority: Medium     Overview:   Created by Conversion       Esophageal reflux 09/17/2018     Priority: Medium     Overview:   Created by Conversion       Hypertension 09/17/2018     Priority: Medium     Overview:   Created by Conversion    Replacement Utility updated for latest IMO load       Hypothyroidism 09/17/2018     Priority: Medium     Overview:   Created by Conversion    Replacement Utility updated for latest IMO load       Insomnia due to anxiety and fear 09/17/2018     Priority: Medium     Lower back pain 09/17/2018     Priority: Medium     Osteoarthrosis 09/17/2018     Priority: Medium     Overview:    Created by Conversion    Replacement Utility updated for latest IMO load       Disorder of bone and cartilage 09/17/2018     Priority: Medium     Overview:   Created by Conversion  [facility] Annotation: Dec 13 2012  7:41Roberta Kelly: vit D/Ca, f/u   Dexa needed 1/2014.    Replacement Utility updated for latest IMO load       Lower extremity weakness 07/04/2018     Priority: Medium     Orthostatic hypotension dysautonomic syndrome (H) 09/22/2017     Priority: Medium     Primary insomnia 07/04/2017     Priority: Medium     Urinary incontinence in female 05/07/2017     Priority: Medium     Weakness 05/04/2017     Priority: Medium     S/P AAA repair using bifurcation graft 11/30/2015     Priority: Medium     Adrenal adenoma 11/20/2015     Priority: Medium     Overview:   Current hormonal evaluation through endocrinology          Past Medical History:    Past Medical History:   Diagnosis Date     Adrenal adenoma      Arthritis      Depressive disorder      Hypertension      Rheumatic fever      Small bowel obstruction (H)      Thyroid disease        Past Surgical History:    Past Surgical History:   Procedure Laterality Date     AAA REPAIR  2015    [facility] bifurcation graft     CARPAL TUNNEL RELEASE RT/LT Bilateral 2013     HYSTERECTOMY  2013     JOINT REPLACEMENT Right 2003     LAPAROSCOPIC CHOLECYSTECTOMY N/A 12/12/2018    Procedure: LAPAROSCOPIC CHOLECYSTECTOMY;  Surgeon: Amadeo Sebastian MD;  Location: [facility] OR     ORTHOPEDIC SURGERY       SPINE SURGERY  1981    cervical spine fusion     THYROIDECTOMY  2011       Family History:    Family History   Problem Relation Age of Onset     Hypertension Mother      Coronary Artery Disease Mother      Coronary Artery Disease Father      Other Cancer Brother      Parkinsonism Other        Social History:  Marital Status:   [5]  Social History     Tobacco Use     Smoking status: Former Smoker     Packs/day: 0.50     Types: Cigarettes     Last attempt to quit:  "1994     Years since quittin.2     Smokeless tobacco: Never Used   Substance Use Topics     Alcohol use: No     Drug use: No        Medications:      acetaminophen (TYLENOL) 500 MG tablet   acetaminophen (TYLENOL) 500 MG tablet   betamethasone dipropionate (DIPROSONE) 0.05 % external cream   bisacodyl (DULCOLAX) 10 MG suppository   calcium carbonate (TUMS) 500 MG chewable tablet   carbidopa-levodopa (SINEMET)  MG per tablet   cholecalciferol (VITAMIN D3) 1000 units (25 mcg) capsule   diclofenac (VOLTAREN) 1 % topical gel   DULoxetine (CYMBALTA) 30 MG capsule   DULoxetine (CYMBALTA) 30 MG capsule   gabapentin (NEURONTIN) 100 MG capsule   HYDROcodone-acetaminophen (NORCO) 5-325 MG tablet   HYDROcodone-acetaminophen (NORCO) 5-325 MG tablet   lactulose (CEPHULAC) 20 GM packet   levothyroxine (SYNTHROID/LEVOTHROID) 137 MCG tablet   losartan (COZAAR) 50 MG tablet   multivitamin, therapeutic (THERA-VIT) TABS tablet   omeprazole (PRILOSEC OTC) 20 MG EC tablet   polyethylene glycol (MIRALAX) powder   polyethylene glycol 0.4%- propylene glycol 0.3% (SYSTANE ULTRA) 0.4-0.3 % SOLN ophthalmic solution   ranitidine (ZANTAC) 150 MG tablet   ranitidine (ZANTAC) 150 MG tablet   senna-docusate (SENOKOT-S/PERICOLACE) 8.6-50 MG tablet   SENNA-docusate sodium (SENNA S) 8.6-50 MG tablet   simethicone (MYLICON) 125 MG chewable tablet   simethicone (MYLICON) 125 MG chewable tablet   triamcinolone (KENALOG) 0.1 % external cream   Zinc Oxide (DESITIN) 13 % CREA         Review of Systems all other systems are reviewed and are negative.    Physical Exam   BP: (!) 176/97  Pulse: 85  Temp: 98.2  F (36.8  C)  Resp: 16  Height: 167.6 cm (5' 6\")  Weight: 67.6 kg (149 lb)  SpO2: 98 %      Physical Exam general alert cooperative female who does not look toxic or ill.  She does look uncomfortable however.  She is afebrile and vitally stable.  HEENT reveals no scleral icterus.  No CVA tenderness.  Abdomen is not distended.  She has " active bowel sounds.  On palpation she is diffusely tender and does not localize.    ED Course        Procedures           Results for orders placed or performed during the hospital encounter of 03/11/19   XR Abdomen 2 Views    Narrative    XR ABDOMEN 2 VW 3/11/2019 1:20 PM    HISTORY: Ongoing issues of constipation.    COMPARISON: CT dated 3/2/2019    FINDINGS: Moderate stool is present in the colon. Mild gaseous  distention of bowel is seen in the right mid and lower abdomen. At  least some of this may relate to small bowel. There are some air-fluid  levels within. They are nonspecific. They could relate to  gastroenteritis in the appropriate clinical setting. No convincing  bowel obstruction can be called at this time. There is no free  peritoneal air.      Impression    IMPRESSION: There is moderate stool in the colon, probably less  compared to the recent CT.  Bowel gas pattern is otherwise nonspecific  at this time, as described above.    NAVI WALDEN MD            Critical Care time:  none               Results for orders placed or performed during the hospital encounter of 03/11/19 (from the past 24 hour(s))   UA reflex to Microscopic and Culture   Result Value Ref Range    Color Urine Yellow     Appearance Urine Clear     Glucose Urine Negative NEG^Negative mg/dL    Bilirubin Urine Negative NEG^Negative    Ketones Urine Negative NEG^Negative mg/dL    Specific Gravity Urine 1.005 1.003 - 1.035    Blood Urine Negative NEG^Negative    pH Urine 8.0 (H) 5.0 - 7.0 pH    Protein Albumin Urine Negative NEG^Negative mg/dL    Urobilinogen mg/dL 0.0 0.0 - 2.0 mg/dL    Nitrite Urine Negative NEG^Negative    Leukocyte Esterase Urine Negative NEG^Negative    Source Midstream Urine    XR Abdomen 2 Views    Narrative    XR ABDOMEN 2 VW 3/11/2019 1:20 PM    HISTORY: Ongoing issues of constipation.    COMPARISON: CT dated 3/2/2019    FINDINGS: Moderate stool is present in the colon. Mild gaseous  distention of bowel is seen  in the right mid and lower abdomen. At  least some of this may relate to small bowel. There are some air-fluid  levels within. They are nonspecific. They could relate to  gastroenteritis in the appropriate clinical setting. No convincing  bowel obstruction can be called at this time. There is no free  peritoneal air.      Impression    IMPRESSION: There is moderate stool in the colon, probably less  compared to the recent CT.  Bowel gas pattern is otherwise nonspecific  at this time, as described above.    NAVI WALDEN MD       Medications   sodium phosphate (FLEET ENEMA) 1 enema (1 enema Rectal Given 3/11/19 1340)   sodium phosphate (FLEET ENEMA) 1 enema (1 enema Rectal Given 3/11/19 1416)     Flat and upright abdominal x-ray ordered.  X-ray imaging did not show any free air or significant air-fluid levels.  Does have stool throughout the colon.  We will start with a Fleet enema.  She had excellent results from the enema.  She requested a second 1 and this was provided.  No significant additional stooling.  She will continue her oral bowel medicines to prevent constipation.  If she has not had a bowel and after 3 days they can try an enema at the nursing home.  Assessments & Plan (with Medical Decision Making)   Mehreen Camara is a 79 year old female who presents with ongoing problems with constipation.  Patient has not had a lifetime history of constipation issues.  Recently had cholecystectomy and was treated with narcotic pain medicines.  She is also on multiple other medicines that can cause constipation.  In addition patient has hypothyroidism.  They have been trying different oral meds such as senna S, lactulose, and was recently provided magnesium citrate to the emergency room with some result.  She states she has diffuse abdominal pain with associated nausea but no vomiting.  Abdominal pain does not localize to the back.  No radicular leg pain.  No urinary symptoms.  Denies chest pain or shortness of  breath.  She states after the magnesium citrate she had some small hard stools.  No bloody stools.  Colonoscopy previously revealed diverticular disease and polyps.  Most recent one which the daughter thinks was 2 years ago did not show polyps.  With her recent ER visit last Sunday patient had blood work, urinalysis, and abdominal CT.  CT showed large stool throughout the entire colon.  Indeterminate left adrenal mass.  Lumbar scoliosis.  On exam patient had some diffuse abdominal tenderness but did not localize.  No guarding or rebound.  The remainder exam was unremarkable.  X-ray did show stool throughout the colon.  No worrisome free air or air-fluid levels.  With fleets enema she had excellent results.  Handout on constipation is provided.  Orders for additional enemas at the nursing home if she does not have a bowel movement after 3 days.  Reasons to return for reassessment discussed.  I have reviewed the nursing notes.    I have reviewed the findings, diagnosis, plan and need for follow up with the patient.          Medication List      There are no discharge medications for this visit.         Final diagnoses:   Drug-induced constipation       3/11/2019   [facility] EMERGENCY DEPARTMENT     Juanjose White MD  03/11/19 [id number]

## 2019-03-12 ENCOUNTER — ASSISTED LIVING VISIT (OUTPATIENT)
Dept: GERIATRICS | Facility: CLINIC | Age: 79
End: 2019-03-12
Payer: COMMERCIAL

## 2019-03-12 VITALS
OXYGEN SATURATION: 99 % | TEMPERATURE: 97.8 F | BODY MASS INDEX: 24.31 KG/M2 | WEIGHT: 150.6 LBS | HEART RATE: 94 BPM | RESPIRATION RATE: 22 BRPM | SYSTOLIC BLOOD PRESSURE: 185 MMHG | DIASTOLIC BLOOD PRESSURE: 118 MMHG

## 2019-03-12 DIAGNOSIS — I95.1 ORTHOSTATIC HYPOTENSION DYSAUTONOMIC SYNDROME: ICD-10-CM

## 2019-03-12 DIAGNOSIS — R14.1 FLATULENCE, ERUCTATION, AND GAS PAIN: ICD-10-CM

## 2019-03-12 DIAGNOSIS — I10 ESSENTIAL HYPERTENSION: ICD-10-CM

## 2019-03-12 DIAGNOSIS — R14.3 FLATULENCE, ERUCTATION, AND GAS PAIN: ICD-10-CM

## 2019-03-12 DIAGNOSIS — F43.23 ADJUSTMENT DISORDER WITH MIXED ANXIETY AND DEPRESSED MOOD: ICD-10-CM

## 2019-03-12 DIAGNOSIS — R14.2 FLATULENCE, ERUCTATION, AND GAS PAIN: ICD-10-CM

## 2019-03-12 DIAGNOSIS — R10.84 ABDOMINAL PAIN, GENERALIZED: ICD-10-CM

## 2019-03-12 DIAGNOSIS — K59.04 CHRONIC IDIOPATHIC CONSTIPATION: ICD-10-CM

## 2019-03-12 DIAGNOSIS — K59.01 SLOW TRANSIT CONSTIPATION: Primary | ICD-10-CM

## 2019-03-12 NOTE — PROGRESS NOTES
[facility]  PRIMARY CARE PROVIDER AND CLINIC:  HAILEE Contreras CNP, [address] / [place] [address]  Chief Complaint   Patient presents with     Hospital F/U     [facility] Medical Record Number:  [id number]  Place of Service where encounter took place:  [facility] [[id number]]    Mehreen Camara  is a 79 year old  (1940), admitted to the above facility from  [facility]. Hospital stay 3/11/19.  Admitted to this facility for  rehab, medical management and nursing care.    HPI:    HPI information obtained from: patient report and [facility] chart review.   Brief Summary of Hospital Course: patient has had x2 ED visits in recent 2 weeks due to feelings of constipation and abdominal pain.  She was first prescribed lactulose to take TID until adequate BM, but this did not result in adequate BM.  She was then given an enema which she reported provided excellent results in the ED.    Updates on Status Since Skilled nursing Admission: She reports her BMs are often very small, very soft, and she does not feel as if she empties completely.  She reports significant gas/flatulence much of the time.  She reports ongoing orthostatic hypotension, but is aware of need to change position slowly.     CODE STATUS/ADVANCE DIRECTIVES DISCUSSION:   DNR / DNI  Patient's living condition: lives in an assisted living facility  ALLERGIES: Penicillins; Aspirin; Atenolol; Atorvastatin; Bupropion; Clindamycin; Codeine; Ibuprofen; Lovastatin; and Cephalexin  PAST MEDICAL HISTORY:  has a past medical history of Adrenal adenoma, Arthritis, Depressive disorder, Hypertension, Rheumatic fever, Small bowel obstruction (H), and Thyroid disease.  PAST SURGICAL HISTORY:   has a past surgical history that includes orthopedic surgery; Thyroidectomy (2011); joint replacement (Right, 2003); Hysterectomy (2013); carpal tunnel release rt/lt (Bilateral, 2013); Spine surgery (1981); aaa repair  (2015); and Laparoscopic cholecystectomy (N/A, 12/12/2018).  FAMILY HISTORY: family history includes Coronary Artery Disease in her father and mother; Hypertension in her mother; Other Cancer in her brother; Parkinsonism in an other family member.  SOCIAL HISTORY:   reports that she quit smoking about 24 years ago. Her smoking use included cigarettes. She smoked 0.50 packs per day. she has never used smokeless tobacco. She reports that she does not drink alcohol or use drugs.    Post Discharge Medication Reconciliation Status: discharge medications reconciled and changed, per note/orders (see AVS)    Current Outpatient Medications   Medication Sig Dispense Refill     acetaminophen (TYLENOL) 500 MG tablet Take 1-2 tablets (500-1,000 mg) by mouth 3 times daily as needed for mild pain  0     acetaminophen (TYLENOL) 500 MG tablet Take 1 tablet (500 mg) by mouth 3 times daily 90 tablet 1     betamethasone dipropionate (DIPROSONE) 0.05 % external cream Apply topically 2 times daily as needed       bisacodyl (DULCOLAX) 10 MG suppository Place 10 mg rectally every 3 days As needed for constipation       calcium carbonate (TUMS) 500 MG chewable tablet Take 1 tablet (500 mg) by mouth every 2 hours as needed for heartburn 150 tablet 1     cholecalciferol (VITAMIN D3) 1000 units (25 mcg) capsule Take 2 capsules by mouth daily       diclofenac (VOLTAREN) 1 % topical gel Apply 4 g topically 4 times daily as needed for moderate pain (to back and neck) 100 g 11     DULoxetine (CYMBALTA) 30 MG capsule Take 1 capsule (30 mg) by mouth daily  0     gabapentin (NEURONTIN) 100 MG capsule Take 2 capsules (200 mg) by mouth 3 times daily 90 capsule 5     HYDROcodone-acetaminophen (NORCO) 5-325 MG tablet Take 1 tablet by mouth every 6 hours as needed for severe pain 60 tablet 0     levothyroxine (SYNTHROID/LEVOTHROID) 137 MCG tablet Take 1 tablet (137 mcg) by mouth daily 90 tablet 3     losartan (COZAAR) 50 MG tablet Take 50 mg by mouth  daily       mineral oil enema Place 1 enema rectally as needed for constipation 1 enema 0     multivitamin, therapeutic (THERA-VIT) TABS tablet Take 1 tablet by mouth daily       omeprazole (PRILOSEC OTC) 20 MG EC tablet Take 20 mg by mouth 2 times daily       polyethylene glycol (MIRALAX) powder Take 17 g by mouth daily 510 g 1     propylene glycol (SYSTANE BALANCE) 0.6 % SOLN ophthalmic solution Place 1 drop into both eyes 4 times daily as needed for dry eyes       ranitidine (ZANTAC) 150 MG tablet Take 1 tablet (150 mg) by mouth At Bedtime  0     ranitidine (ZANTAC) 150 MG tablet Take 150 mg by mouth At Bedtime       senna-docusate (SENOKOT-S/PERICOLACE) 8.6-50 MG tablet Take 2 tablets by mouth every morning And 1 tablet every evening. May also take 1 tablet twice daily as needed.       SENNA-docusate sodium (SENNA S) 8.6-50 MG tablet Take 2 tablets by mouth every morning AND 1 tablet At Bedtime. May also take 1 tablet 2 times daily as needed (constipation). 60 tablet 0     Zinc Oxide (DESITIN) 13 % CREA Twice daily to rashy area until resolved       ROS:  4 point ROS including Respiratory, CV, GI and , other than that noted in the HPI,  is negative    Vitals:  BP (!) 185/118   Pulse 94   Temp 97.8  F (36.6  C)   Resp 22   Wt 68.3 kg (150 lb 9.6 oz)   SpO2 99%   BMI 24.31 kg/m    Exam:  GENERAL APPEARANCE:  Alert, in no acute distress , anxious  HEAD:  Normal, normocephalic, atraumatic  EYE EXAM: normal external eye, conjunctiva, lids, JONO  NECK EXAM: supple, no JVD  CHEST/RESP:  respiratory effort normal, lung sounds CTA , no respiratory distress  CV:  Rate reg, rhythm reg, no murmur, trace peripheral edema   GI/ABDOMEN:  normal bowel sounds, soft, nontender, no palpable masses  M/S:   extremities normal, gait normal-at baseline, normal muscle tone, and range of motion normal   NEUROLOGIC EXAM: Normal gross motor movement, tone and coordination. No tremor. Cranial nerves 2-12 are normal tested and  grossly at patient's baseline  PSYCH:  Alert and oriented to person-place-time , affect anxious       Lab/Diagnostic data:  Recent labs in [facility] reviewed by me today.     ASSESSMENT/PLAN:  Chronic idiopathic constipation  Flatulence, eructation, and gas pain  Abdominal pain, generalized  Patient with chronic constipation not well controlled with current regimen of miralax, lactulose prescribed at ED last week ineffective as well.  She does have a history of IBS by report.  She found great relief with use of fleets enema at ED.  At this point, will continue with prn fleets enema if no good BM in 2-3 days.  She may need further titration of medications or GI referral for next steps.  She will let the assisted living facility nurse know when she has not had a BM or has increased abdominal pain.   - mineral oil enema; Place 1 enema rectally as needed for constipation  -discontinue lactulose    Essential hypertension  Orthostatic hypotension dysautonomic syndrome (H)  On losartan, checking BP BID and orthostatic BP 3 x per week.  Stable. No changes indicated today.     Adjustment disorder with mixed anxiety and depressed mood  celexa discontinued and started on duloxetine last week. No new symptoms today.        transcribed by : Te Sargent  1. discontinue Lactulose daily  2. Fleet Enema Dx: Constipation  -ask nurse for enema  -nurse to record frequency of use  -may self administer if she can demonstrate proper technique       Electronically signed by:  HAILEE Contreras CNP

## 2019-03-13 ENCOUNTER — RECORDS - HEALTHEAST (OUTPATIENT)
Dept: ADMINISTRATIVE | Facility: OTHER | Age: 79
End: 2019-03-13

## 2019-03-15 RX ORDER — MINERAL OIL 100 G/100G
1 OIL RECTAL PRN
Qty: 1 ENEMA | Refills: 0
Start: 2019-03-15 | End: 2020-06-17

## 2019-03-17 ENCOUNTER — APPOINTMENT (OUTPATIENT)
Dept: GENERAL RADIOLOGY | Facility: CLINIC | Age: 79
End: 2019-03-17
Attending: EMERGENCY MEDICINE
Payer: COMMERCIAL

## 2019-03-17 ENCOUNTER — HOSPITAL ENCOUNTER (EMERGENCY)
Facility: CLINIC | Age: 79
Discharge: HOME | End: 2019-03-17
Attending: EMERGENCY MEDICINE | Admitting: EMERGENCY MEDICINE
Payer: COMMERCIAL

## 2019-03-17 VITALS
SYSTOLIC BLOOD PRESSURE: 178 MMHG | WEIGHT: 145 LBS | BODY MASS INDEX: 23.4 KG/M2 | RESPIRATION RATE: 16 BRPM | DIASTOLIC BLOOD PRESSURE: 86 MMHG | HEART RATE: 85 BPM | TEMPERATURE: 97.5 F | OXYGEN SATURATION: 97 %

## 2019-03-17 DIAGNOSIS — K59.00 CONSTIPATION, UNSPECIFIED CONSTIPATION TYPE: ICD-10-CM

## 2019-03-17 PROCEDURE — 25000132 ZZH RX MED GY IP 250 OP 250 PS 637: Mod: GY | Performed by: EMERGENCY MEDICINE

## 2019-03-17 PROCEDURE — 99284 EMERGENCY DEPT VISIT MOD MDM: CPT | Mod: Z6 | Performed by: EMERGENCY MEDICINE

## 2019-03-17 PROCEDURE — 99284 EMERGENCY DEPT VISIT MOD MDM: CPT | Mod: 25

## 2019-03-17 PROCEDURE — 96372 THER/PROPH/DIAG INJ SC/IM: CPT

## 2019-03-17 PROCEDURE — 25000128 H RX IP 250 OP 636: Performed by: EMERGENCY MEDICINE

## 2019-03-17 PROCEDURE — A9270 NON-COVERED ITEM OR SERVICE: HCPCS | Mod: GY | Performed by: EMERGENCY MEDICINE

## 2019-03-17 PROCEDURE — 74019 RADEX ABDOMEN 2 VIEWS: CPT

## 2019-03-17 RX ORDER — KETOROLAC TROMETHAMINE 30 MG/ML
30 INJECTION, SOLUTION INTRAMUSCULAR; INTRAVENOUS ONCE
Status: COMPLETED | OUTPATIENT
Start: 2019-03-17 | End: 2019-03-17

## 2019-03-17 RX ORDER — MAGNESIUM CARB/ALUMINUM HYDROX 105-160MG
296 TABLET,CHEWABLE ORAL ONCE
Status: COMPLETED | OUTPATIENT
Start: 2019-03-17 | End: 2019-03-17

## 2019-03-17 RX ADMIN — KETOROLAC TROMETHAMINE 30 MG: 30 INJECTION, SOLUTION INTRAMUSCULAR at 16:29

## 2019-03-17 RX ADMIN — MAGNESIUM CITRATE 296 ML: 1.75 LIQUID ORAL at 16:30

## 2019-03-17 RX ADMIN — SODIUM PHOSPHATE 1 ENEMA: 7; 19 ENEMA RECTAL at 12:35

## 2019-03-17 RX ADMIN — DOCUSATE SODIUM 286 ML: 50 LIQUID ORAL at 13:59

## 2019-03-17 ASSESSMENT — ENCOUNTER SYMPTOMS
ABDOMINAL PAIN: 1
CONSTIPATION: 1
FEVER: 0
NAUSEA: 1
VOMITING: 0
CHILLS: 0

## 2019-03-17 NOTE — ED PROVIDER NOTES
"  History     Chief Complaint   Patient presents with     Abdominal Pain     several visits for similar complaints     HPI  Mehreen Camara is a 79 year old female who presents to the Emergency Department with concern for ongoing constipation. Patient is on multiple constipating medications. Additionally, she had a cholecystectomy in December 2018 and was treated with narcotic pain medications. Patient was seen 6 days ago with similar symptoms. Xray showed stool throughout the colon. Patient had excellent results with fleets enema. Orders were written for additional enemas at the nursing home if she did not have a bowel movements after 3 days. Patient returns today stating she continued to be constipated with bilateral low abdominal discomfort. She is mildly nauseous without vomiting. She is reportedly passing \"small bits\" but not having normal bowel movements. She states a fleets enema was tried at the nursing home without benefit.     Allergies:  Allergies   Allergen Reactions     Penicillins Anaphylaxis, Rash and Shortness Of Breath     Aspirin Nausea     Atenolol Cough     Atorvastatin Muscle Pain (Myalgia)     Bupropion Nausea     Clindamycin Other (See Comments)     Constipation      Codeine Nausea     Ibuprofen Nausea     Stomach upset     Lovastatin Muscle Pain (Myalgia)     Cephalexin Rash     Neck reddness       Problem List:    Patient Active Problem List    Diagnosis Date Noted     Post-op pain 12/12/2018     Priority: Medium     S/P laparoscopic cholecystectomy 12/12/2018     Priority: Medium     Multiple system atrophy (H) 11/14/2018     Priority: Medium     Movement disorder 09/19/2018     Priority: Medium     Rheumatic aortic stenosis 09/19/2018     Priority: Medium     Disorder of bursae and tendons in shoulder region 09/18/2018     Priority: Medium     Overview:   Created by Conversion    Replacement Utility updated for latest IMO load       Adjustment disorder with anxious mood 09/17/2018     " Priority: Medium     Adjustment disorder with mixed anxiety and depressed mood 09/17/2018     Priority: Medium     Aneurysm of abdominal vessel (H) 09/17/2018     Priority: Medium     Overview:   Created by Conversion  [facility] Annotation: Jun 8 2011  8:07AM - Danni Ortiz: 4.4 on 11/2013.    Needs 6-12 month u/s or CT.    Replacement Utility updated for latest IMO load       Harris's esophagus 09/17/2018     Priority: Medium     Overview:   Created by Conversion  [facility] Annotation: Feb  3 2012  8:32AM - Cameron Obando: Needs EGD 2/2017       Bradycardia 09/17/2018     Priority: Medium     Cataract 09/17/2018     Priority: Medium     Overview:   Created by Conversion       Major depressive disorder, recurrent episode (H) 09/17/2018     Priority: Medium     Overview:   Created by Conversion    Replacement Utility updated for latest IMO load       Constipation 09/17/2018     Priority: Medium     Dizziness 09/17/2018     Priority: Medium     Overview:   Created by Conversion       Dyslipidemia 09/17/2018     Priority: Medium     Overview:   Created by Conversion       Esophageal reflux 09/17/2018     Priority: Medium     Overview:   Created by Conversion       Hypertension 09/17/2018     Priority: Medium     Overview:   Created by Conversion    Replacement Utility updated for latest IMO load       Hypothyroidism 09/17/2018     Priority: Medium     Overview:   Created by Conversion    Replacement Utility updated for latest IMO load       Insomnia due to anxiety and fear 09/17/2018     Priority: Medium     Lower back pain 09/17/2018     Priority: Medium     Osteoarthrosis 09/17/2018     Priority: Medium     Overview:   Created by Conversion    Replacement Utility updated for latest IMO load       Disorder of bone and cartilage 09/17/2018     Priority: Medium     Overview:   Created by [facility] Annotation: Dec 13 2012  7:41AM Roberta Goodwin: vit D/Ca, f/u   Dexa needed 1/2014.    Replacement  Utility updated for latest IMO load       Lower extremity weakness 2018     Priority: Medium     Orthostatic hypotension dysautonomic syndrome (H) 2017     Priority: Medium     Primary insomnia 2017     Priority: Medium     Urinary incontinence in female 2017     Priority: Medium     Weakness 2017     Priority: Medium     S/P AAA repair using bifurcation graft 2015     Priority: Medium     Adrenal adenoma 2015     Priority: Medium     Overview:   Current hormonal evaluation through endocrinology          Past Medical History:    Past Medical History:   Diagnosis Date     Adrenal adenoma      Arthritis      Depressive disorder      Hypertension      Rheumatic fever      Small bowel obstruction (H)      Thyroid disease        Past Surgical History:    Past Surgical History:   Procedure Laterality Date     AAA REPAIR      wt bifurcation graft     CARPAL TUNNEL RELEASE RT/LT Bilateral 2013     HYSTERECTOMY  2013     JOINT REPLACEMENT Right 2003     LAPAROSCOPIC CHOLECYSTECTOMY N/A 2018    Procedure: LAPAROSCOPIC CHOLECYSTECTOMY;  Surgeon: Amadeo Sebastian MD;  Location: [facility] OR     ORTHOPEDIC SURGERY       SPINE SURGERY      cervical spine fusion     THYROIDECTOMY         Family History:    Family History   Problem Relation Age of Onset     Hypertension Mother      Coronary Artery Disease Mother      Coronary Artery Disease Father      Other Cancer Brother      Parkinsonism Other        Social History:  Marital Status:   [5]  Social History     Tobacco Use     Smoking status: Former Smoker     Packs/day: 0.50     Types: Cigarettes     Last attempt to quit: 1994     Years since quittin.2     Smokeless tobacco: Never Used   Substance Use Topics     Alcohol use: No     Drug use: No        Medications:      acetaminophen (TYLENOL) 500 MG tablet   bisacodyl (DULCOLAX) 10 MG suppository   calcium carbonate (TUMS) 500 MG chewable tablet    cholecalciferol (VITAMIN D3) 1000 units (25 mcg) capsule   DULoxetine (CYMBALTA) 30 MG capsule   gabapentin (NEURONTIN) 100 MG capsule   HYDROcodone-acetaminophen (NORCO) 5-325 MG tablet   levothyroxine (SYNTHROID/LEVOTHROID) 137 MCG tablet   losartan (COZAAR) 50 MG tablet   mineral oil enema   multivitamin, therapeutic (THERA-VIT) TABS tablet   omeprazole (PRILOSEC OTC) 20 MG EC tablet   polyethylene glycol (MIRALAX) powder   ranitidine (ZANTAC) 150 MG tablet   SENNA-docusate sodium (SENNA S) 8.6-50 MG tablet   acetaminophen (TYLENOL) 500 MG tablet   betamethasone dipropionate (DIPROSONE) 0.05 % external cream   diclofenac (VOLTAREN) 1 % topical gel   propylene glycol (SYSTANE BALANCE) 0.6 % SOLN ophthalmic solution   Zinc Oxide (DESITIN) 13 % CREA       Review of Systems   Constitutional: Negative for chills and fever.   Gastrointestinal: Positive for abdominal pain, constipation and nausea. Negative for vomiting.   All other systems reviewed and are negative.    Physical Exam   BP: (!) 198/96  Pulse: 85  Temp: 97.5  F (36.4  C)  Resp: 16  Weight: 65.8 kg (145 lb)  SpO2: 97 %      Physical Exam general alert anxious female.  HEENT shows no scleral icterus.  Speech clear.  Abdominal exam reveals active bowel sounds.  On palpation abdomen is soft and there is no localizing tenderness, masses, or organomegaly.  No skin rash over the flank or abdomen.  No CVA tenderness.    ED Course        Procedures               Critical Care time:  none               Results for orders placed or performed during the hospital encounter of 03/17/19 (from the past 24 hour(s))   XR Abdomen 2 Views    Narrative    ABDOMEN TWO VIEWS 3/17/2019 1:12 PM     HISTORY: Diffuse abdominal pain.      Impression    IMPRESSION: Nonspecific, nonobstructive bowel gas pattern.  Aortobiiliac graft is noted. No free air under the right  hemidiaphragm. Right total hip arthroplasty is noted.    OWEN MANCILLA MD       Medications   magnesium citrate  "solution 296 mL (not administered)   sodium phosphate (FLEET ENEMA) 1 enema (1 enema Rectal Given 3/17/19 1235)   pink lady enema (286 mLs Rectal Given 3/17/19 1359)   ketorolac (TORADOL) injection 30 mg (30 mg Intramuscular Given 3/17/19 3391)       12:29 PM Patient assessed.   She was given a Fleet enema with minimal results.  Flat and upright abdominal x-ray was obtained.  No significant results of the fleets.  Pink lady is offered.  Minimal results with just some soft brown stool.  Soapy tapwater enemas ordered.  Assessments & Plan (with Medical Decision Making)   Mehreen Camara is a 79 year old female who presents to the Emergency Department with concern for ongoing constipation. Patient is on multiple constipating medications. Additionally, she had a cholecystectomy in December 2018 and was treated with narcotic pain medications. Patient was seen 6 days ago with similar symptoms. Xray showed stool throughout the colon. Patient had excellent results with fleets enema. Orders were written for additional enemas at the nursing home if she did not have a bowel movements after 3 days. Patient returns today stating she continued to be constipated with bilateral low abdominal discomfort. She is mildly nauseous without vomiting. She is reportedly passing \"small bits\" but not having normal bowel movements. She states a fleets enema was tried at the nursing home without benefit.  Presentation patient was afebrile and vitally stable.  She is hypertensive but has a history of this.  She had no CVA tenderness.  She had no localizing abdominal tenderness.  No guarding or rebound.  X-ray shows no worrisome gas pattern still is stool noted in the left colon.  Attempts with different types of enemas with minimally effective.  Patient had a lot of crampy pain and requesting something for pain.  She was given IM Toradol.  She does not have fever, diarrhea, or localized abdominal pain so doubt diverticulitis.  On her two most " recent ER visits urinalysis were obtained and was unremarkable.  It was not repeated today.  His blood work was also unremarkable and this was not completed today.  X-ray today did not show any worrisome gas pattern.  Patient is not vomiting so doubt obstruction.  Patient be given mag citrate.  I strongly recommend they follow-up with GI.  Continue oral bowel regime.  Enemas as previously recommended if no bowel movement after 2-3 days.  Handout on constipation is provided.  Just she discussed with her doctor to review her meds to see if they can eliminate some of the medicines that are constipating.  I have reviewed the nursing notes.    I have reviewed the findings, diagnosis, plan and need for follow up with the patient.          Medication List      There are no discharge medications for this visit.         Final diagnoses:   Constipation, unspecified constipation type     This document serves as a record of the services and decisions personally performed and made by Juanjose White MD. It was created on his behalf by Nicole Navarro, a trained medical scribe. The creation of this document is based the provider's statements to the medical scribe.  Nicole Navarro 12:29 PM 3/17/2019    Provider:   The information in this document, created by the medical scribe for me, accurately reflects the services I personally performed and the decisions made by me. I have reviewed and approved this document for accuracy prior to leaving the patient care area.  Juanjose White MD 12:29 PM 3/17/2019    3/17/2019   [facility] EMERGENCY DEPARTMENT     Juanjose White MD  03/17/19 3935

## 2019-03-17 NOTE — ED AVS SNAPSHOT
[facility] Emergency Department  [address] [address]  Phone:  [phone]  Fax:  [phone]                                    Mehreen Camara   MRN: [id number]    Department:  [facility] Emergency Department   Date of Visit:  3/17/2019           After Visit Summary Signature Page    I have received my discharge instructions, and my questions have been answered. I have discussed any challenges I see with this plan with the nurse or doctor.    ..........................................................................................................................................  Patient/Patient Representative Signature      ..........................................................................................................................................  Patient Representative Print Name and Relationship to Patient    ..................................................               ................................................  Date                                   Time    ..........................................................................................................................................  Reviewed by Signature/Title    ...................................................              ..............................................  Date                                               Time          22EPIC Rev 08/18

## 2019-03-17 NOTE — DISCHARGE INSTRUCTIONS
Magnesium citrate to see if that will evacuate your stool.  Talk to your doctor about your medicines to see if some of the constipating medicines can be eliminated or changed.  I strongly recommend you follow-up with gastroenterologist.  Your daughter is making arrangements for this.

## 2019-03-21 ENCOUNTER — TRANSFERRED RECORDS (OUTPATIENT)
Dept: HEALTH INFORMATION MANAGEMENT | Facility: CLINIC | Age: 79
End: 2019-03-21

## 2019-04-04 ENCOUNTER — TRANSFERRED RECORDS (OUTPATIENT)
Dept: HEALTH INFORMATION MANAGEMENT | Facility: CLINIC | Age: 79
End: 2019-04-04

## 2019-04-06 ENCOUNTER — MEDICAL CORRESPONDENCE (OUTPATIENT)
Dept: HEALTH INFORMATION MANAGEMENT | Facility: CLINIC | Age: 79
End: 2019-04-06

## 2019-04-18 ENCOUNTER — TRANSFERRED RECORDS (OUTPATIENT)
Dept: HEALTH INFORMATION MANAGEMENT | Facility: CLINIC | Age: 79
End: 2019-04-18

## 2019-04-19 DIAGNOSIS — M54.42 CHRONIC BILATERAL LOW BACK PAIN WITH BILATERAL SCIATICA: ICD-10-CM

## 2019-04-19 DIAGNOSIS — M54.41 CHRONIC BILATERAL LOW BACK PAIN WITH BILATERAL SCIATICA: ICD-10-CM

## 2019-04-19 DIAGNOSIS — G89.29 CHRONIC BILATERAL LOW BACK PAIN WITH BILATERAL SCIATICA: ICD-10-CM

## 2019-04-21 RX ORDER — HYDROCODONE BITARTRATE AND ACETAMINOPHEN 5; 325 MG/1; MG/1
TABLET ORAL
Qty: 60 TABLET | Refills: 0 | Status: SHIPPED | OUTPATIENT
Start: 2019-04-21 | End: 2019-04-22

## 2019-04-22 DIAGNOSIS — G89.29 CHRONIC BILATERAL LOW BACK PAIN WITH BILATERAL SCIATICA: ICD-10-CM

## 2019-04-22 DIAGNOSIS — M54.42 CHRONIC BILATERAL LOW BACK PAIN WITH BILATERAL SCIATICA: ICD-10-CM

## 2019-04-22 DIAGNOSIS — M54.41 CHRONIC BILATERAL LOW BACK PAIN WITH BILATERAL SCIATICA: ICD-10-CM

## 2019-04-22 RX ORDER — HYDROCODONE BITARTRATE AND ACETAMINOPHEN 5; 325 MG/1; MG/1
TABLET ORAL
Qty: 60 TABLET | Refills: 0 | Status: SHIPPED | OUTPATIENT
Start: 2019-04-22 | End: 2019-05-14

## 2019-04-23 ENCOUNTER — ASSISTED LIVING VISIT (OUTPATIENT)
Dept: GERIATRICS | Facility: CLINIC | Age: 79
End: 2019-04-23
Payer: COMMERCIAL

## 2019-04-23 VITALS
BODY MASS INDEX: 22.56 KG/M2 | OXYGEN SATURATION: 92 % | WEIGHT: 139.8 LBS | HEART RATE: 82 BPM | SYSTOLIC BLOOD PRESSURE: 127 MMHG | DIASTOLIC BLOOD PRESSURE: 85 MMHG | TEMPERATURE: 98.2 F | RESPIRATION RATE: 18 BRPM

## 2019-04-23 DIAGNOSIS — R11.0 NAUSEA: ICD-10-CM

## 2019-04-23 DIAGNOSIS — L30.9 DERMATITIS: ICD-10-CM

## 2019-04-23 DIAGNOSIS — R10.84 ABDOMINAL PAIN, GENERALIZED: Primary | ICD-10-CM

## 2019-04-23 DIAGNOSIS — K59.09 OTHER CONSTIPATION: ICD-10-CM

## 2019-04-23 DIAGNOSIS — L60.3 NAIL DYSTROPHY: ICD-10-CM

## 2019-04-23 DIAGNOSIS — R63.4 WEIGHT LOSS: ICD-10-CM

## 2019-04-23 RX ORDER — TRIAMCINOLONE ACETONIDE 1 MG/G
CREAM TOPICAL 2 TIMES DAILY PRN
Start: 2019-04-23 | End: 2020-06-16

## 2019-04-23 RX ORDER — CARBIDOPA AND LEVODOPA 25; 100 MG/1; MG/1
2 TABLET ORAL 3 TIMES DAILY
COMMUNITY
End: 2020-09-18

## 2019-04-23 RX ORDER — POLYETHYLENE GLYCOL 3350 17 G/17G
17 POWDER, FOR SOLUTION ORAL 2 TIMES DAILY PRN
Start: 2019-04-23 | End: 2019-08-27

## 2019-04-23 NOTE — PROGRESS NOTES
[facility]  [place] Medical Record Number:  [id number]  Place of Service where encounter took place:  [facility] ([facility] [[id number]]  Chief Complaint   Patient presents with     RECHECK       HPI:    Mehreen Camara  is a 79 year old (1940), who is being seen today for an episodic care visit.  HPI information obtained from: facility staff, patient report, [facility] chart review and family/first contact daughter, Iras[place], report. Today's concern is:     Abdominal pain, generalized  Nausea  Weight loss  Other constipation  Nail dystrophy  Dermatitis     Mehreen has ongoing pain in belly, back, rectum with constipation, diarrhea, flatulence that is extremely bothersome to her.  She reports she has had diarrhea as she was afraid to refuse miralax.  She reports she is continuing to lose weight as she has such difficulty going to the dining room to eat.  She reports she would like to get off the Norco, but finds it gives her great relief and when she missed it she was quite uncomfortable.     Future Appointments   Date Time Provider Department Center   5/2/2019  4:40 PM Sea Altamirano DPM [facility]   5/14/2019  2:30 PM Chris Monterroso MD [facility]        Past Medical and Surgical History reviewed in Epic today.    MEDICATIONS:  Current Outpatient Medications   Medication Sig Dispense Refill     acetaminophen (TYLENOL) 500 MG tablet Take 1-2 tablets (500-1,000 mg) by mouth 3 times daily as needed for mild pain  0     acetaminophen (TYLENOL) 500 MG tablet Take 1 tablet (500 mg) by mouth 3 times daily 90 tablet 1     betamethasone dipropionate (DIPROSONE) 0.05 % external cream Apply topically 2 times daily as needed       bisacodyl (DULCOLAX) 10 MG suppository Place 10 mg rectally every 3 days As needed for constipation       calcium carbonate (TUMS) 500 MG chewable tablet Take 1 tablet (500 mg) by mouth every 2 hours as needed for heartburn 150 tablet 1     carbidopa-levodopa  (SINEMET)  MG tablet Take 2 tablets by mouth 3 times daily       cholecalciferol (VITAMIN D3) 1000 units (25 mcg) capsule Take 2 capsules by mouth daily       diclofenac (VOLTAREN) 1 % topical gel Apply 4 g topically 4 times daily as needed for moderate pain (to back and neck) 100 g 11     DULoxetine (CYMBALTA) 30 MG capsule Take 1 capsule (30 mg) by mouth daily  0     gabapentin (NEURONTIN) 100 MG capsule Take 2 capsules (200 mg) by mouth 3 times daily 90 capsule 5     HYDROcodone-acetaminophen (NORCO) 5-325 MG tablet TAKE 1 TABLET BY MOUTH EVERY 6 HOURS AS NEEDED FOR PAIN (MAX APAP 4GM/24HRS) 60 tablet 0     levothyroxine (SYNTHROID/LEVOTHROID) 137 MCG tablet Take 1 tablet (137 mcg) by mouth daily 90 tablet 3     linaclotide (LINZESS) 72 MCG capsule Take 72 mcg by mouth every morning (before breakfast)       losartan (COZAAR) 50 MG tablet Take 50 mg by mouth daily       mineral oil enema Place 1 enema rectally as needed for constipation 1 enema 0     multivitamin, therapeutic (THERA-VIT) TABS tablet Take 1 tablet by mouth daily       omeprazole (PRILOSEC OTC) 20 MG EC tablet Take 20 mg by mouth 2 times daily       polyethylene glycol (MIRALAX) powder Take 17 g by mouth daily 510 g 1     polyethylene glycol (MIRALAX/GLYCOLAX) packet Take 17 g by mouth 2 times daily as needed for constipation       propylene glycol (SYSTANE BALANCE) 0.6 % SOLN ophthalmic solution Place 1 drop into both eyes 4 times daily as needed for dry eyes       ranitidine (ZANTAC) 150 MG tablet Take 1 tablet (150 mg) by mouth At Bedtime  0     triamcinolone (KENALOG) 0.1 % external cream Apply topically 2 times daily as needed for irritation       Zinc Oxide (DESITIN) 13 % CREA Twice daily to rashy area until resolved       SENNA-docusate sodium (SENNA S) 8.6-50 MG tablet Take 2 tablets by mouth every morning AND 1 tablet At Bedtime. May also take 1 tablet 2 times daily as needed (constipation). 60 tablet 0       REVIEW OF SYSTEMS:  4  point ROS including Respiratory, CV, GI and , other than that noted in the HPI,  is negative    Objective:  /85   Pulse 82   Temp 98.2  F (36.8  C)   Resp 18   Wt 63.4 kg (139 lb 12.8 oz)   SpO2 92%   BMI 22.56 kg/m    Exam:  GENERAL APPEARANCE:  Alert, in moderate distress , anxious and fretful  HEAD:  Normal, normocephalic, atraumatic  EYE EXAM: normal external eye, conjunctiva, lids, JONO  NECK EXAM: supple, no JVD  CHEST/RESP:  respiratory effort normal, lung sounds CTA , no respiratory distress  CV:  Rate reg, rhythm reg, no murmur, trace peripheral edema   M/S:   extremities normal, gait normal-without device, normal muscle tone, and range of motion normal  SKIN EXAM: bilateral feet with thick dystrophic nails on remaining toes  NEUROLOGIC EXAM: Normal gross motor movement, tone and coordination. No tremor. Cranial nerves 2-12 are normal tested and grossly at patient's baseline  PSYCH:  Alert and oriented to person-place-time , affect anxious       Labs:   Recent labs in [facility] reviewed by me today.     ASSESSMENT/PLAN:  Abdominal pain, generalized  Nausea  Weight loss  Other constipation  Patient with ongoing pain, nausea, weight loss, constipation and diarrhea that is difficult for her to manage.  She has been seen by GI, and now has referral for [facility] for ongoing care.  She was started on Linzness by GI, and did not tolerate a dose increase.  Family working with her to contact GI for ongoing follow up and recommendations. She does sometimes have some increased constipation and would like a little more miralax.     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  Likely needs dremmel . Discussed, and will refer for podiatry.   - ORTHO  REFERRAL    Dermatitis  Using prn, no current flare.   -  triamcinolone (KENALOG) 0.1 % external cream; Apply topically 2 times daily as needed for skin irritation    transcribed by : Te Sargent  1. Miralax(Gavilax) 8.5 gm-17g BID PRN PO Dx: Constipation  2. Podiatry Referral at [facility]      Electronically signed by:  HAILEE Contreras CNP

## 2019-05-01 ENCOUNTER — OFFICE VISIT - HEALTHEAST (OUTPATIENT)
Dept: CARDIOLOGY | Facility: CLINIC | Age: 79
End: 2019-05-01

## 2019-05-01 DIAGNOSIS — I10 ESSENTIAL HYPERTENSION: ICD-10-CM

## 2019-05-01 DIAGNOSIS — I95.1 ORTHOSTATIC HYPOTENSION: ICD-10-CM

## 2019-05-01 ASSESSMENT — MIFFLIN-ST. JEOR: SCORE: 1138.33

## 2019-05-02 ENCOUNTER — OFFICE VISIT (OUTPATIENT)
Dept: PODIATRY | Facility: CLINIC | Age: 79
End: 2019-05-02
Payer: COMMERCIAL

## 2019-05-02 VITALS
WEIGHT: 145 LBS | HEART RATE: 84 BPM | BODY MASS INDEX: 23.3 KG/M2 | HEIGHT: 66 IN | DIASTOLIC BLOOD PRESSURE: 82 MMHG | SYSTOLIC BLOOD PRESSURE: 150 MMHG

## 2019-05-02 DIAGNOSIS — M79.675 PAIN DUE TO ONYCHOMYCOSIS OF TOENAILS OF BOTH FEET: Primary | ICD-10-CM

## 2019-05-02 DIAGNOSIS — B35.1 PAIN DUE TO ONYCHOMYCOSIS OF TOENAILS OF BOTH FEET: Primary | ICD-10-CM

## 2019-05-02 DIAGNOSIS — M79.674 PAIN DUE TO ONYCHOMYCOSIS OF TOENAILS OF BOTH FEET: Primary | ICD-10-CM

## 2019-05-02 PROCEDURE — 99203 OFFICE O/P NEW LOW 30 MIN: CPT | Mod: 25 | Performed by: PODIATRIST

## 2019-05-02 PROCEDURE — 11721 DEBRIDE NAIL 6 OR MORE: CPT | Performed by: PODIATRIST

## 2019-05-02 ASSESSMENT — MIFFLIN-ST. JEOR: SCORE: 1149.47

## 2019-05-02 NOTE — NURSING NOTE
"Chief Complaint   Patient presents with     Consult     nail dystrophy       Initial /82   Pulse 84   Ht 1.676 m (5' 6\")   Wt 65.8 kg (145 lb)   BMI 23.40 kg/m   Estimated body mass index is 23.4 kg/m  as calculated from the following:    Height as of this encounter: 1.676 m (5' 6\").    Weight as of this encounter: 65.8 kg (145 lb).  Medications and allergies reviewed.      Carisa VIEIRA MA    "

## 2019-05-02 NOTE — LETTER
5/2/2019         RE: Mehreen Camara  [facility]  [address]  [facility] [address]        Dear Colleague,    Thank you for referring your patient, Mehreen Camara, to the [facility]. Please see a copy of my visit note below.    PATIENT HISTORY:  Mehreen Camara is a 79 year old female who presents with her daughter to clinic for a painful toenails on both feet.  The patient relates that the nails become thick and elongated hard to trim on her own.  The patient relates pain with shoe gear.    REVIEW OF SYSTEMS:  Constitutional, HEENT, cardiovascular, pulmonary, GI, , musculoskeletal, neuro, skin, endocrine and psych systems are negative, except as otherwise noted.     PAST MEDICAL HISTORY:   Past Medical History:   Diagnosis Date     Adrenal adenoma     stable, thought not to be hormonally active     Arthritis      Depressive disorder      Hypertension      Rheumatic fever     thought to have had as a child     Small bowel obstruction (H)      Thyroid disease         PAST SURGICAL HISTORY:   Past Surgical History:   Procedure Laterality Date     AAA REPAIR  2015    wtih bifurcation graft     CARPAL TUNNEL RELEASE RT/LT Bilateral 2013     HYSTERECTOMY  2013     JOINT REPLACEMENT Right 2003     LAPAROSCOPIC CHOLECYSTECTOMY N/A 12/12/2018    Procedure: LAPAROSCOPIC CHOLECYSTECTOMY;  Surgeon: Amadeo Sebastian MD;  Location: [facility]     ORTHOPEDIC SURGERY       SPINE SURGERY  1981    cervical spine fusion     THYROIDECTOMY  2011        MEDICATIONS:   Current Outpatient Medications:      acetaminophen (TYLENOL) 500 MG tablet, Take 1-2 tablets (500-1,000 mg) by mouth 3 times daily as needed for mild pain, Disp: , Rfl: 0     acetaminophen (TYLENOL) 500 MG tablet, Take 1 tablet (500 mg) by mouth 3 times daily, Disp: 90 tablet, Rfl: 1     betamethasone dipropionate (DIPROSONE) 0.05 % external cream, Apply topically 2 times daily as needed, Disp: , Rfl:       bisacodyl (DULCOLAX) 10 MG suppository, Place 10 mg rectally every 3 days As needed for constipation, Disp: , Rfl:      calcium carbonate (TUMS) 500 MG chewable tablet, Take 1 tablet (500 mg) by mouth every 2 hours as needed for heartburn, Disp: 150 tablet, Rfl: 1     carbidopa-levodopa (SINEMET)  MG tablet, Take 2 tablets by mouth 3 times daily, Disp: , Rfl:      cholecalciferol (VITAMIN D3) 1000 units (25 mcg) capsule, Take 2 capsules by mouth daily, Disp: , Rfl:      diclofenac (VOLTAREN) 1 % topical gel, Apply 4 g topically 4 times daily as needed for moderate pain (to back and neck), Disp: 100 g, Rfl: 11     DULoxetine (CYMBALTA) 30 MG capsule, Take 1 capsule (30 mg) by mouth daily, Disp: , Rfl: 0     gabapentin (NEURONTIN) 100 MG capsule, Take 2 capsules (200 mg) by mouth 3 times daily, Disp: 90 capsule, Rfl: 5     HYDROcodone-acetaminophen (NORCO) 5-325 MG tablet, TAKE 1 TABLET BY MOUTH EVERY 6 HOURS AS NEEDED FOR PAIN (MAX APAP 4GM/24HRS), Disp: 60 tablet, Rfl: 0     levothyroxine (SYNTHROID/LEVOTHROID) 137 MCG tablet, Take 1 tablet (137 mcg) by mouth daily, Disp: 90 tablet, Rfl: 3     linaclotide (LINZESS) 72 MCG capsule, Take 72 mcg by mouth every morning (before breakfast), Disp: , Rfl:      losartan (COZAAR) 50 MG tablet, Take 50 mg by mouth daily, Disp: , Rfl:      mineral oil enema, Place 1 enema rectally as needed for constipation, Disp: 1 enema, Rfl: 0     multivitamin, therapeutic (THERA-VIT) TABS tablet, Take 1 tablet by mouth daily, Disp: , Rfl:      omeprazole (PRILOSEC OTC) 20 MG EC tablet, Take 20 mg by mouth 2 times daily, Disp: , Rfl:      polyethylene glycol (MIRALAX) powder, Take 17 g by mouth daily, Disp: 510 g, Rfl: 1     polyethylene glycol (MIRALAX/GLYCOLAX) packet, Take 17 g by mouth 2 times daily as needed for constipation, Disp: , Rfl:      propylene glycol (SYSTANE BALANCE) 0.6 % SOLN ophthalmic solution, Place 1 drop into both eyes 4 times daily as needed for dry eyes, Disp:  , Rfl:      ranitidine (ZANTAC) 150 MG tablet, Take 1 tablet (150 mg) by mouth At Bedtime, Disp: , Rfl: 0     triamcinolone (KENALOG) 0.1 % external cream, Apply topically 2 times daily as needed for irritation, Disp: , Rfl:      Zinc Oxide (DESITIN) 13 % CREA, Twice daily to rashy area until resolved, Disp: , Rfl:      sertraline (ZOLOFT) 25 MG tablet, Take 1 tablet (25 mg) by mouth daily 25 mg x 2 weeks, then 50 mg daily, Disp: 60 tablet, Rfl: 0     Simethicone (GAS RELIEF) 125 MG CAPS, Take 125 mg by mouth 4 times daily as needed (flatulence/gas), Disp: , Rfl:      ALLERGIES:    Allergies   Allergen Reactions     Penicillins Anaphylaxis, Rash and Shortness Of Breath     Aspirin Nausea     Atenolol Cough     Atorvastatin Muscle Pain (Myalgia)     Bupropion Nausea     Clindamycin Other (See Comments)     Constipation      Codeine Nausea     Ibuprofen Nausea     Stomach upset     Lovastatin Muscle Pain (Myalgia)     Cephalexin Rash     Neck reddness        SOCIAL HISTORY:   Social History     Socioeconomic History     Marital status:      Spouse name: Not on file     Number of children: Not on file     Years of education: Not on file     Highest education level: Not on file   Occupational History     Not on file   Social Needs     Financial resource strain: Not on file     Food insecurity:     Worry: Not on file     Inability: Not on file     Transportation needs:     Medical: Not on file     Non-medical: Not on file   Tobacco Use     Smoking status: Former Smoker     Packs/day: 0.50     Types: Cigarettes     Last attempt to quit: 1994     Years since quittin.4     Smokeless tobacco: Never Used   Substance and Sexual Activity     Alcohol use: No     Drug use: No     Sexual activity: Not Currently   Lifestyle     Physical activity:     Days per week: Not on file     Minutes per session: Not on file     Stress: Not on file   Relationships     Social connections:     Talks on phone: Not on file      "Gets together: Not on file     Attends Hindu service: Not on file     Active member of club or organization: Not on file     Attends meetings of clubs or organizations: Not on file     Relationship status: Not on file     Intimate partner violence:     Fear of current or ex partner: Not on file     Emotionally abused: Not on file     Physically abused: Not on file     Forced sexual activity: Not on file   Other Topics Concern     Parent/sibling w/ CABG, MI or angioplasty before 65F 55M? Not Asked   Social History Narrative     Not on file        FAMILY HISTORY:   Family History   Problem Relation Age of Onset     Hypertension Mother      Coronary Artery Disease Mother      Coronary Artery Disease Father      Other Cancer Brother      Parkinsonism Other         EXAM:Vitals: /82   Pulse 84   Ht 1.676 m (5' 6\")   Wt 65.8 kg (145 lb)   BMI 23.40 kg/m     BMI= Body mass index is 23.4 kg/m .        General appearance: Patient is alert and fully cooperative with history & exam.  No sign of distress is noted during the visit.     Psychiatric: Affect is pleasant & appropriate.  Patient appears motivated to improve health.     Respiratory: Breathing is regular & unlabored while sitting.     HEENT: Hearing is intact to spoken word.  Speech is clear.  No gross evidence of visual impairment that would impact ambulation.     Dermatologic: Skin is intact to both lower extremities without significant lesions, rash or abrasion.  No paronychia or evidence of soft tissue infection is noted.  The toenails appear hypertrophic elongated incurvated x10.  No surrounding erythema noted.     Vascular: DP & PT pulses are intact & regular bilaterally.  No significant edema or varicosities noted.  CFT and skin temperature is normal to both lower extremities.     Neurologic: Lower extremity sensation is intact to light touch.  No evidence of weakness or contracture in the lower extremities.  No evidence of neuropathy.   "   Musculoskeletal: Patient is ambulatory without assistive device or brace.  No gross ankle deformity noted.  No foot or ankle joint effusion is noted.          ASSESSMENT / PLAN:     ICD-10-CM    1. Pain due to onychomycosis of toenails of both feet B35.1     M79.675     M79.674        I have explained to Mehreen  about the conditions.  We discussed the nature of the condition as well as the treatment plan and expected length of recovery.  At this time, the nails were sharply debrided with a nail nipper down to normal length and thickness.  No bleeding noted.  Patient was given referrals to foot care nursing services in the community.      Disclaimer: This note consists of symbols derived from keyboarding, dictation and/or voice recognition software. As a result, there may be errors in the script that have gone undetected. Please consider this when interpreting information found in this chart.       DARIEL Wallace.P.M., F.JOSE.C.F.A.S.        Again, thank you for allowing me to participate in the care of your patient.        Sincerely,        Sea Altamirano DPM

## 2019-05-06 VITALS
TEMPERATURE: 98.5 F | OXYGEN SATURATION: 96 % | SYSTOLIC BLOOD PRESSURE: 149 MMHG | DIASTOLIC BLOOD PRESSURE: 88 MMHG | BODY MASS INDEX: 22.95 KG/M2 | RESPIRATION RATE: 18 BRPM | HEART RATE: 81 BPM | WEIGHT: 142.2 LBS

## 2019-05-06 NOTE — PROGRESS NOTES
[facility]  [place] [facility] Record Number:  [id number]  Place of Service where encounter took place:  [facility] ([facility]) [[id number]]  Chief Complaint   Patient presents with     RECHECK       HPI:    Mehreen Camara  is a 79 year old (1940), who is being seen today for an episodic care visit.  HPI information obtained from: facility chart records, facility staff, patient report and family/first contact , daughter Irasema, report. Today's concern is:     Chronic idiopathic constipation  Flatulence, eructation, and gas pain  Abdominal pain, generalized  Nausea  Weight loss  Adjustment disorder with mixed anxiety and depressed mood     Mehreen continues to have difficulty with nausea, gas, abdominal pain.  She reports her bowels are almost always soft but she has a difficult time expelling the bowel/pushing it out.  She reports almost constant pain in the lower abdomen/groin area as well as chronic low back pain which makes it difficult to sit easily.  Mehreen also notes that she continues to have significant depression not improved at all with duloxetine and verbalizes agreement to suggestion to consider change from duloxetine to sertraline.      Her daughter requests I review OTC medications that Mehreen is purchasing on her own and send a letter to [facility] Financial worker so that her spend down can be adjusted to cover the costs of these medications.  See letter in this encounter.      Past Medical and Surgical History reviewed in Epic today.    MEDICATIONS:  Current Outpatient Medications   Medication Sig Dispense Refill     acetaminophen (TYLENOL) 500 MG tablet Take 1-2 tablets (500-1,000 mg) by mouth 3 times daily as needed for mild pain  0     acetaminophen (TYLENOL) 500 MG tablet Take 1 tablet (500 mg) by mouth 3 times daily 90 tablet 1     betamethasone dipropionate (DIPROSONE) 0.05 % external cream Apply topically 2 times daily as needed       bisacodyl (DULCOLAX) 10 MG  suppository Place 10 mg rectally every 3 days As needed for constipation       calcium carbonate (TUMS) 500 MG chewable tablet Take 1 tablet (500 mg) by mouth every 2 hours as needed for heartburn 150 tablet 1     carbidopa-levodopa (SINEMET)  MG tablet Take 2 tablets by mouth 3 times daily       cholecalciferol (VITAMIN D3) 1000 units (25 mcg) capsule Take 2 capsules by mouth daily       diclofenac (VOLTAREN) 1 % topical gel Apply 4 g topically 4 times daily as needed for moderate pain (to back and neck) 100 g 11     DULoxetine (CYMBALTA) 30 MG capsule Take 1 capsule (30 mg) by mouth daily  0     gabapentin (NEURONTIN) 100 MG capsule Take 2 capsules (200 mg) by mouth 3 times daily 90 capsule 5     HYDROcodone-acetaminophen (NORCO) 5-325 MG tablet TAKE 1 TABLET BY MOUTH EVERY 6 HOURS AS NEEDED FOR PAIN (MAX APAP 4GM/24HRS) 60 tablet 0     levothyroxine (SYNTHROID/LEVOTHROID) 137 MCG tablet Take 1 tablet (137 mcg) by mouth daily 90 tablet 3     linaclotide (LINZESS) 72 MCG capsule Take 72 mcg by mouth every morning (before breakfast)       losartan (COZAAR) 50 MG tablet Take 50 mg by mouth daily       mineral oil enema Place 1 enema rectally as needed for constipation 1 enema 0     multivitamin, therapeutic (THERA-VIT) TABS tablet Take 1 tablet by mouth daily       omeprazole (PRILOSEC OTC) 20 MG EC tablet Take 20 mg by mouth 2 times daily       polyethylene glycol (MIRALAX) powder Take 17 g by mouth daily 510 g 1     polyethylene glycol (MIRALAX/GLYCOLAX) packet Take 17 g by mouth 2 times daily as needed for constipation       propylene glycol (SYSTANE BALANCE) 0.6 % SOLN ophthalmic solution Place 1 drop into both eyes 4 times daily as needed for dry eyes       ranitidine (ZANTAC) 150 MG tablet Take 1 tablet (150 mg) by mouth At Bedtime  0     sertraline (ZOLOFT) 25 MG tablet Take 1 tablet (25 mg) by mouth daily 25 mg x 2 weeks, then 50 mg daily 60 tablet 0     Simethicone (GAS RELIEF) 125 MG CAPS Take 125 mg  "by mouth 4 times daily as needed (flatulence/gas)       triamcinolone (KENALOG) 0.1 % external cream Apply topically 2 times daily as needed for irritation       Zinc Oxide (DESITIN) 13 % CREA Twice daily to rashy area until resolved       REVIEW OF SYSTEMS:  4 point ROS including Respiratory, CV, GI and , other than that noted in the HPI,  is negative    Objective:  /88   Pulse 81   Temp 98.5  F (36.9  C)   Resp 18   Wt 64.5 kg (142 lb 3.2 oz)   SpO2 96%   BMI 22.95 kg/m    Exam:  GENERAL APPEARANCE:  Alert, in moderate distress , anxious  HEAD:  Normal, normocephalic, atraumatic  EYE EXAM: normal external eye, conjunctiva, lids, JONO  NECK EXAM: stiff, no JVD  CHEST/RESP:  respiratory effort normal, lung sounds CTA , no respiratory distress  CV:  Rate reg, rhythm reg, no murmur, no peripheral edema   GI/ABDOMEN:  normal bowel sounds, soft, nontender, no palpable masses  M/S:   extremities normal, gait normal, normal muscle tone, and range of motion normal   NEUROLOGIC EXAM: Normal gross motor movement, tone and coordination. No tremor. Cranial nerves 2-12 are normal tested and grossly at patient's baseline  PSYCH:  Alert and oriented to person-place-time , affect anxious, judgement appropriate      Labs:   Recent labs in [facility] reviewed by me today.     ASSESSMENT/PLAN:     Chronic idiopathic constipation  Flatulence, eructation, and gas pain  Abdominal pain, generalized  Nausea  Weight loss  Adjustment disorder with mixed anxiety and depressed mood     Mehreen continues with constipation, gas, abdominal pain, nausea, weakness. She has been seen in follow up by gastroenterology and is now on linzness.  She finds little benefit from this but has been taking it.  She has chronic idiopathic constipation and is on miralax daily and prn.  She reports stools remain soft, but she has a hard time expelling the stool. She has a referral to \"[facility] \" who she will be seeing in the next couple of " weeks.  She also has ongoing depression with anxiety-not improved by duloxetine which she has been on for several months.  Will change to sertraline.     Orders written by provider at facility  1.  Discontinue duloxetine  2.  Start sertraline 25 mg daily x 2 weeks, then 50 mg daily for depression    Electronically signed by:  HAILEE Contreras CNP

## 2019-05-07 ENCOUNTER — ASSISTED LIVING VISIT (OUTPATIENT)
Dept: GERIATRICS | Facility: CLINIC | Age: 79
End: 2019-05-07
Payer: COMMERCIAL

## 2019-05-07 DIAGNOSIS — R10.84 ABDOMINAL PAIN, GENERALIZED: ICD-10-CM

## 2019-05-07 DIAGNOSIS — R14.1 FLATULENCE, ERUCTATION, AND GAS PAIN: ICD-10-CM

## 2019-05-07 DIAGNOSIS — F43.23 ADJUSTMENT DISORDER WITH MIXED ANXIETY AND DEPRESSED MOOD: ICD-10-CM

## 2019-05-07 DIAGNOSIS — R11.0 NAUSEA: ICD-10-CM

## 2019-05-07 DIAGNOSIS — K59.04 CHRONIC IDIOPATHIC CONSTIPATION: Primary | ICD-10-CM

## 2019-05-07 DIAGNOSIS — R14.2 FLATULENCE, ERUCTATION, AND GAS PAIN: ICD-10-CM

## 2019-05-07 DIAGNOSIS — R14.3 FLATULENCE, ERUCTATION, AND GAS PAIN: ICD-10-CM

## 2019-05-07 DIAGNOSIS — R63.4 WEIGHT LOSS: ICD-10-CM

## 2019-05-08 RX ORDER — SERTRALINE HYDROCHLORIDE 25 MG/1
25 TABLET, FILM COATED ORAL DAILY
Qty: 60 TABLET | Refills: 0
Start: 2019-05-08 | End: 2019-07-02

## 2019-05-08 RX ORDER — SIMETHICONE 125 MG
125 CAPSULE ORAL 4 TIMES DAILY PRN
Start: 2019-05-08 | End: 2020-04-27

## 2019-05-08 NOTE — PROGRESS NOTES
Geriatric Services  [facility] [address] [address]      May 8, 2019    To:   Doreen Ma  [facility] Financial Worker   phone [phone]   fax [phone]      Re: Mehreen Camara, 1940     The following over-the-counter medications and products are routinely purchased by Mehreen:  1.  Steph Intimates Incontinence Pads  2.  Premoistened soft wipes (for incontinence care)  3.  Pull-up type incontinence briefs (size M)  4.  Acetaminophen (Tylenol)  500 mg tabs   5.  Polyethylene Glycol (Miralax)  6.  Simethicone 125 mg tabs  7.  Calcium Citrate (TUMS)  500 mg daily       Sincerely,    HAILEE Contreras [facility]  [phone] cell

## 2019-05-09 NOTE — PROGRESS NOTES
PATIENT HISTORY:  Mehreen Camara is a 79 year old female who presents with her daughter to clinic for a painful toenails on both feet.  The patient relates that the nails become thick and elongated hard to trim on her own.  The patient relates pain with shoe gear.    REVIEW OF SYSTEMS:  Constitutional, HEENT, cardiovascular, pulmonary, GI, , musculoskeletal, neuro, skin, endocrine and psych systems are negative, except as otherwise noted.     PAST MEDICAL HISTORY:   Past Medical History:   Diagnosis Date     Adrenal adenoma     stable, thought not to be hormonally active     Arthritis      Depressive disorder      Hypertension      Rheumatic fever     thought to have had as a child     Small bowel obstruction (H)      Thyroid disease         PAST SURGICAL HISTORY:   Past Surgical History:   Procedure Laterality Date     AAA REPAIR  2015    wtih bifurcation graft     CARPAL TUNNEL RELEASE RT/LT Bilateral 2013     HYSTERECTOMY  2013     JOINT REPLACEMENT Right 2003     LAPAROSCOPIC CHOLECYSTECTOMY N/A 12/12/2018    Procedure: LAPAROSCOPIC CHOLECYSTECTOMY;  Surgeon: Amadeo Sebastian MD;  Location: [facility]     ORTHOPEDIC SURGERY       SPINE SURGERY  1981    cervical spine fusion     THYROIDECTOMY  2011        MEDICATIONS:   Current Outpatient Medications:      acetaminophen (TYLENOL) 500 MG tablet, Take 1-2 tablets (500-1,000 mg) by mouth 3 times daily as needed for mild pain, Disp: , Rfl: 0     acetaminophen (TYLENOL) 500 MG tablet, Take 1 tablet (500 mg) by mouth 3 times daily, Disp: 90 tablet, Rfl: 1     betamethasone dipropionate (DIPROSONE) 0.05 % external cream, Apply topically 2 times daily as needed, Disp: , Rfl:      bisacodyl (DULCOLAX) 10 MG suppository, Place 10 mg rectally every 3 days As needed for constipation, Disp: , Rfl:      calcium carbonate (TUMS) 500 MG chewable tablet, Take 1 tablet (500 mg) by mouth every 2 hours as needed for heartburn, Disp: 150 tablet, Rfl: 1     carbidopa-levodopa  (SINEMET)  MG tablet, Take 2 tablets by mouth 3 times daily, Disp: , Rfl:      cholecalciferol (VITAMIN D3) 1000 units (25 mcg) capsule, Take 2 capsules by mouth daily, Disp: , Rfl:      diclofenac (VOLTAREN) 1 % topical gel, Apply 4 g topically 4 times daily as needed for moderate pain (to back and neck), Disp: 100 g, Rfl: 11     DULoxetine (CYMBALTA) 30 MG capsule, Take 1 capsule (30 mg) by mouth daily, Disp: , Rfl: 0     gabapentin (NEURONTIN) 100 MG capsule, Take 2 capsules (200 mg) by mouth 3 times daily, Disp: 90 capsule, Rfl: 5     HYDROcodone-acetaminophen (NORCO) 5-325 MG tablet, TAKE 1 TABLET BY MOUTH EVERY 6 HOURS AS NEEDED FOR PAIN (MAX APAP 4GM/24HRS), Disp: 60 tablet, Rfl: 0     levothyroxine (SYNTHROID/LEVOTHROID) 137 MCG tablet, Take 1 tablet (137 mcg) by mouth daily, Disp: 90 tablet, Rfl: 3     linaclotide (LINZESS) 72 MCG capsule, Take 72 mcg by mouth every morning (before breakfast), Disp: , Rfl:      losartan (COZAAR) 50 MG tablet, Take 50 mg by mouth daily, Disp: , Rfl:      mineral oil enema, Place 1 enema rectally as needed for constipation, Disp: 1 enema, Rfl: 0     multivitamin, therapeutic (THERA-VIT) TABS tablet, Take 1 tablet by mouth daily, Disp: , Rfl:      omeprazole (PRILOSEC OTC) 20 MG EC tablet, Take 20 mg by mouth 2 times daily, Disp: , Rfl:      polyethylene glycol (MIRALAX) powder, Take 17 g by mouth daily, Disp: 510 g, Rfl: 1     polyethylene glycol (MIRALAX/GLYCOLAX) packet, Take 17 g by mouth 2 times daily as needed for constipation, Disp: , Rfl:      propylene glycol (SYSTANE BALANCE) 0.6 % SOLN ophthalmic solution, Place 1 drop into both eyes 4 times daily as needed for dry eyes, Disp: , Rfl:      ranitidine (ZANTAC) 150 MG tablet, Take 1 tablet (150 mg) by mouth At Bedtime, Disp: , Rfl: 0     triamcinolone (KENALOG) 0.1 % external cream, Apply topically 2 times daily as needed for irritation, Disp: , Rfl:      Zinc Oxide (DESITIN) 13 % CREA, Twice daily to rashy area  until resolved, Disp: , Rfl:      sertraline (ZOLOFT) 25 MG tablet, Take 1 tablet (25 mg) by mouth daily 25 mg x 2 weeks, then 50 mg daily, Disp: 60 tablet, Rfl: 0     Simethicone (GAS RELIEF) 125 MG CAPS, Take 125 mg by mouth 4 times daily as needed (flatulence/gas), Disp: , Rfl:      ALLERGIES:    Allergies   Allergen Reactions     Penicillins Anaphylaxis, Rash and Shortness Of Breath     Aspirin Nausea     Atenolol Cough     Atorvastatin Muscle Pain (Myalgia)     Bupropion Nausea     Clindamycin Other (See Comments)     Constipation      Codeine Nausea     Ibuprofen Nausea     Stomach upset     Lovastatin Muscle Pain (Myalgia)     Cephalexin Rash     Neck reddness        SOCIAL HISTORY:   Social History     Socioeconomic History     Marital status:      Spouse name: Not on file     Number of children: Not on file     Years of education: Not on file     Highest education level: Not on file   Occupational History     Not on file   Social Needs     Financial resource strain: Not on file     Food insecurity:     Worry: Not on file     Inability: Not on file     Transportation needs:     Medical: Not on file     Non-medical: Not on file   Tobacco Use     Smoking status: Former Smoker     Packs/day: 0.50     Types: Cigarettes     Last attempt to quit: 1994     Years since quittin.4     Smokeless tobacco: Never Used   Substance and Sexual Activity     Alcohol use: No     Drug use: No     Sexual activity: Not Currently   Lifestyle     Physical activity:     Days per week: Not on file     Minutes per session: Not on file     Stress: Not on file   Relationships     Social connections:     Talks on phone: Not on file     Gets together: Not on file     Attends Sikh service: Not on file     Active member of club or organization: Not on file     Attends meetings of clubs or organizations: Not on file     Relationship status: Not on file     Intimate partner violence:     Fear of current or ex partner:  "Not on file     Emotionally abused: Not on file     Physically abused: Not on file     Forced sexual activity: Not on file   Other Topics Concern     Parent/sibling w/ CABG, MI or angioplasty before 65F 55M? Not Asked   Social History Narrative     Not on file        FAMILY HISTORY:   Family History   Problem Relation Age of Onset     Hypertension Mother      Coronary Artery Disease Mother      Coronary Artery Disease Father      Other Cancer Brother      Parkinsonism Other         EXAM:Vitals: /82   Pulse 84   Ht 1.676 m (5' 6\")   Wt 65.8 kg (145 lb)   BMI 23.40 kg/m    BMI= Body mass index is 23.4 kg/m .        General appearance: Patient is alert and fully cooperative with history & exam.  No sign of distress is noted during the visit.     Psychiatric: Affect is pleasant & appropriate.  Patient appears motivated to improve health.     Respiratory: Breathing is regular & unlabored while sitting.     HEENT: Hearing is intact to spoken word.  Speech is clear.  No gross evidence of visual impairment that would impact ambulation.     Dermatologic: Skin is intact to both lower extremities without significant lesions, rash or abrasion.  No paronychia or evidence of soft tissue infection is noted.  The toenails appear hypertrophic elongated incurvated x10.  No surrounding erythema noted.     Vascular: DP & PT pulses are intact & regular bilaterally.  No significant edema or varicosities noted.  CFT and skin temperature is normal to both lower extremities.     Neurologic: Lower extremity sensation is intact to light touch.  No evidence of weakness or contracture in the lower extremities.  No evidence of neuropathy.     Musculoskeletal: Patient is ambulatory without assistive device or brace.  No gross ankle deformity noted.  No foot or ankle joint effusion is noted.          ASSESSMENT / PLAN:     ICD-10-CM    1. Pain due to onychomycosis of toenails of both feet B35.1     M79.675     M79.674        I have " explained to Mehreen  about the conditions.  We discussed the nature of the condition as well as the treatment plan and expected length of recovery.  At this time, the nails were sharply debrided with a nail nipper down to normal length and thickness.  No bleeding noted.  Patient was given referrals to foot care nursing services in the community.      Disclaimer: This note consists of symbols derived from keyboarding, dictation and/or voice recognition software. As a result, there may be errors in the script that have gone undetected. Please consider this when interpreting information found in this chart.       MYRNA Altamirano D.P.M., [email or web address].

## 2019-05-09 NOTE — PATIENT INSTRUCTIONS
[facility]    [facility]  Ekaterina Valle R.N.  Certified Foot Care Specialist    [address] [place] [address]    [facility], Insured  Problem toenails, Diabetics   In clinics or in your own home.    Call for appointment: [phone]  Email: [email or web address]

## 2019-05-11 ENCOUNTER — COMMUNICATION - HEALTHEAST (OUTPATIENT)
Dept: CARDIOLOGY | Facility: CLINIC | Age: 79
End: 2019-05-11

## 2019-05-11 DIAGNOSIS — G89.29 CHRONIC BILATERAL LOW BACK PAIN WITH BILATERAL SCIATICA: ICD-10-CM

## 2019-05-11 DIAGNOSIS — M81.0 AGE-RELATED OSTEOPOROSIS WITHOUT CURRENT PATHOLOGICAL FRACTURE: Primary | ICD-10-CM

## 2019-05-11 DIAGNOSIS — M15.0 PRIMARY OSTEOARTHRITIS INVOLVING MULTIPLE JOINTS: ICD-10-CM

## 2019-05-11 DIAGNOSIS — F43.23 ADJUSTMENT DISORDER WITH MIXED ANXIETY AND DEPRESSED MOOD: ICD-10-CM

## 2019-05-11 DIAGNOSIS — K21.9 GASTROESOPHAGEAL REFLUX DISEASE, ESOPHAGITIS PRESENCE NOT SPECIFIED: ICD-10-CM

## 2019-05-11 DIAGNOSIS — E03.9 ACQUIRED HYPOTHYROIDISM: ICD-10-CM

## 2019-05-11 DIAGNOSIS — I10 ESSENTIAL HYPERTENSION: ICD-10-CM

## 2019-05-11 DIAGNOSIS — M54.41 CHRONIC BILATERAL LOW BACK PAIN WITH BILATERAL SCIATICA: ICD-10-CM

## 2019-05-11 DIAGNOSIS — M54.42 CHRONIC BILATERAL LOW BACK PAIN WITH BILATERAL SCIATICA: ICD-10-CM

## 2019-05-12 RX ORDER — GABAPENTIN 100 MG/1
CAPSULE ORAL
Qty: 180 CAPSULE | Refills: 11 | Status: SHIPPED | OUTPATIENT
Start: 2019-05-12 | End: 2019-11-09

## 2019-05-12 RX ORDER — LOSARTAN POTASSIUM 50 MG/1
TABLET ORAL
Qty: 30 TABLET | Refills: 11 | Status: SHIPPED | OUTPATIENT
Start: 2019-05-12 | End: 2020-10-16

## 2019-05-12 RX ORDER — MULTIVITAMIN WITH FOLIC ACID 400 MCG
TABLET ORAL
Qty: 100 TABLET | Refills: 11 | Status: SHIPPED | OUTPATIENT
Start: 2019-05-12 | End: 2020-05-22

## 2019-05-12 RX ORDER — ACETAMINOPHEN 500 MG/1
TABLET ORAL
Qty: 100 TABLET | Refills: 11 | Status: SHIPPED | OUTPATIENT
Start: 2019-05-12 | End: 2019-12-31

## 2019-05-12 RX ORDER — LEVOTHYROXINE SODIUM 137 UG/1
TABLET ORAL
Qty: 30 TABLET | Refills: 11 | Status: SHIPPED | OUTPATIENT
Start: 2019-05-12 | End: 2020-07-21 | Stop reason: DRUGHIGH

## 2019-05-12 RX ORDER — DULOXETIN HYDROCHLORIDE 30 MG/1
CAPSULE, DELAYED RELEASE ORAL
Qty: 30 CAPSULE | Refills: 0 | Status: SHIPPED | OUTPATIENT
Start: 2019-05-12 | End: 2019-05-16

## 2019-05-12 RX ORDER — CHOLECALCIFEROL (VITAMIN D3) 25 MCG
TABLET ORAL
Qty: 100 TABLET | Refills: 11 | Status: SHIPPED | OUTPATIENT
Start: 2019-05-12 | End: 2019-12-31

## 2019-05-14 ENCOUNTER — ASSISTED LIVING VISIT (OUTPATIENT)
Dept: GERIATRICS | Facility: CLINIC | Age: 79
End: 2019-05-14
Payer: COMMERCIAL

## 2019-05-14 ENCOUNTER — OFFICE VISIT (OUTPATIENT)
Dept: NEUROLOGY | Facility: CLINIC | Age: 79
End: 2019-05-14
Payer: COMMERCIAL

## 2019-05-14 VITALS
RESPIRATION RATE: 18 BRPM | BODY MASS INDEX: 22.95 KG/M2 | TEMPERATURE: 98.5 F | DIASTOLIC BLOOD PRESSURE: 88 MMHG | WEIGHT: 142.2 LBS | OXYGEN SATURATION: 96 % | SYSTOLIC BLOOD PRESSURE: 149 MMHG | HEART RATE: 81 BPM

## 2019-05-14 VITALS — BODY MASS INDEX: 23.4 KG/M2 | WEIGHT: 145 LBS | OXYGEN SATURATION: 96 %

## 2019-05-14 DIAGNOSIS — M54.41 CHRONIC BILATERAL LOW BACK PAIN WITH BILATERAL SCIATICA: ICD-10-CM

## 2019-05-14 DIAGNOSIS — G90.3 MULTIPLE SYSTEM ATROPHY (H): ICD-10-CM

## 2019-05-14 DIAGNOSIS — M54.42 CHRONIC BILATERAL LOW BACK PAIN WITH BILATERAL SCIATICA: ICD-10-CM

## 2019-05-14 DIAGNOSIS — R14.2 FLATULENCE, ERUCTATION, AND GAS PAIN: ICD-10-CM

## 2019-05-14 DIAGNOSIS — G23.8 MULTIPLE SYSTEM ATROPHY (H): ICD-10-CM

## 2019-05-14 DIAGNOSIS — G89.29 CHRONIC BILATERAL LOW BACK PAIN WITH BILATERAL SCIATICA: ICD-10-CM

## 2019-05-14 DIAGNOSIS — K59.04 CHRONIC IDIOPATHIC CONSTIPATION: ICD-10-CM

## 2019-05-14 DIAGNOSIS — R14.1 FLATULENCE, ERUCTATION, AND GAS PAIN: ICD-10-CM

## 2019-05-14 DIAGNOSIS — F43.23 ADJUSTMENT DISORDER WITH MIXED ANXIETY AND DEPRESSED MOOD: Primary | ICD-10-CM

## 2019-05-14 DIAGNOSIS — G23.2 MULTIPLE SYSTEM ATROPHY P (H): Primary | ICD-10-CM

## 2019-05-14 DIAGNOSIS — R14.3 FLATULENCE, ERUCTATION, AND GAS PAIN: ICD-10-CM

## 2019-05-14 RX ORDER — HYDROCODONE BITARTRATE AND ACETAMINOPHEN 5; 325 MG/1; MG/1
1 TABLET ORAL EVERY 6 HOURS PRN
Qty: 60 TABLET | Refills: 0 | Status: SHIPPED | OUTPATIENT
Start: 2019-05-14 | End: 2019-05-14

## 2019-05-14 RX ORDER — HYDROCODONE BITARTRATE AND ACETAMINOPHEN 5; 325 MG/1; MG/1
1 TABLET ORAL EVERY 6 HOURS PRN
Qty: 60 TABLET | Refills: 0 | Status: SHIPPED | OUTPATIENT
Start: 2019-05-14 | End: 2019-06-10

## 2019-05-14 ASSESSMENT — ENCOUNTER SYMPTOMS
SHORTNESS OF BREATH: 1
SPEECH CHANGE: 0
SYNCOPE: 0
JAUNDICE: 0
BLOATING: 1
POLYPHAGIA: 0
HEMATURIA: 0
MYALGIAS: 1
SINUS CONGESTION: 0
HYPERTENSION: 1
HALLUCINATIONS: 0
MUSCLE CRAMPS: 1
NECK MASS: 0
TROUBLE SWALLOWING: 0
PALPITATIONS: 0
NAIL CHANGES: 0
LIGHT-HEADEDNESS: 1
WEAKNESS: 1
LOSS OF CONSCIOUSNESS: 0
PANIC: 1
EXERCISE INTOLERANCE: 1
BACK PAIN: 1
DISTURBANCES IN COORDINATION: 1
COUGH: 1
SORE THROAT: 0
DIARRHEA: 1
DYSURIA: 1
HEARTBURN: 1
EYE WATERING: 1
INSOMNIA: 1
FEVER: 0
JOINT SWELLING: 1
HYPOTENSION: 1
SPUTUM PRODUCTION: 0
DEPRESSION: 1
STIFFNESS: 1
EYE PAIN: 1
TINGLING: 1
ALTERED TEMPERATURE REGULATION: 1
HEMOPTYSIS: 0
HOARSE VOICE: 1
WEIGHT GAIN: 0
POOR WOUND HEALING: 0
NIGHT SWEATS: 0
MUSCLE WEAKNESS: 1
VOMITING: 0
NERVOUS/ANXIOUS: 1
RECTAL PAIN: 0
TASTE DISTURBANCE: 1
TREMORS: 0
NUMBNESS: 1
DIZZINESS: 1
SINUS PAIN: 0
DOUBLE VISION: 1
POLYDIPSIA: 1
MEMORY LOSS: 1
ABDOMINAL PAIN: 1
ARTHRALGIAS: 1
SLEEP DISTURBANCES DUE TO BREATHING: 0
DIFFICULTY URINATING: 1
EYE IRRITATION: 0
ORTHOPNEA: 1
DECREASED CONCENTRATION: 1
WHEEZING: 0
BLOOD IN STOOL: 0
DYSPNEA ON EXERTION: 0
HEADACHES: 1
NAUSEA: 1
EYE REDNESS: 1
SEIZURES: 0
WEIGHT LOSS: 1
NECK PAIN: 1
POSTURAL DYSPNEA: 1
CHILLS: 1
DECREASED APPETITE: 1
INCREASED ENERGY: 1
SKIN CHANGES: 0
BOWEL INCONTINENCE: 0
FATIGUE: 1
PARALYSIS: 0
SNORES LOUDLY: 0
SMELL DISTURBANCE: 1
LEG PAIN: 1
COUGH DISTURBING SLEEP: 0
CONSTIPATION: 1

## 2019-05-14 ASSESSMENT — PAIN SCALES - GENERAL: PAINLEVEL: EXTREME PAIN (8)

## 2019-05-14 NOTE — NURSING NOTE
Chief Complaint   Patient presents with     RECHECK     [facility] RETURN 6 MO F/U MOVEMENT DISORDER       Felisha Mariano, EMT

## 2019-05-14 NOTE — PROGRESS NOTES
Movement Disorder Clinic follow up note    Patient: Mehreen Camara  Medical Record Number: [id number]  Encounter Date: May 14, 2019  PCP:Cathy Sargent    CC: Parkinsonism    Impression:   1.  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  Orthostatic hypotension without fainting  3.  Increased fatigue and generalized weakness.    Recommendations:  1.  The patient continues to have parkinsonism with minimal response to levodopa.  On past studies she has some pontine T2 signal change on her MRI.  The autonomic studies done by Dr. Ba showed severe autonomic dysfunction.  She continues to have severe orthostatic hypotension.  All of this suggests multiple system atrophy-P rather than idiopathic Parkinson's disease.  Thankfully however there have been no signs of major change on her objective exam today.  2.  The patient does have a feeling of increasing weakness.  On examination there are no signs of upper motor neuron or lower motor neuron weakness.  I think this reflects progression of her parkinsonism.  I do not think increasing her levodopa dosage is going to improve this.  3.  Thankfully she is not fainting.  She does feel weak when she stands up.  Unfortunately we have no blood pressure records from her assisted living records.  Today in our office her blood pressure looks good with some supine hypertension but good standing blood pressures.  We will continue to manage this with fluids and elevation of the head of the bed.  Florinef and midodrine would not be indicated at this time.  4.  Discussed in full.  See back in 6 months    Return to clinic: 6 months    Interval Hx:: Ms. Mehreen Camara returns to clinic today for follow-up of her atypical parkinsonism.  She says overall she feels weaker.  Her legs in particular are feeling weaker.  She says she feels weak all over however.  This is happening in the absence of any clear paralysis, fasciculation or atrophy.  Thankfully she is not  falling.  She is living in an assisted living center.  They are taking her blood pressures.  It sounds as if her major problem is supine hypertension.  She does not believe that her standing blood pressures often fall below 100 systolic.  She is not fainting.  She does feel weak when she stands up.    She has very bad constipation.  She is doing the right things.  She is drinking 7 cups of fluid.  She is using daily fiber.  She is using MiraLAX daily.  At times however it sounds as if she gets impacted.    All of her movements are slow but she has no tremor.    She is taking carbidopa/levodopa, 25/100, 2 tablets 3 times a day.  She is noticing no bad effects from this.    Her blood pressure records from assisted living are not available today.  She will fax them to me.    Current medications    Movement Disorder-related Medications                   am noon pm     Carbidopa/levodopa 25/100                                                2 2 2                                                                                 Current Outpatient Medications   Medication     acetaminophen (TYLENOL) 500 MG tablet     acetaminophen (TYLENOL) 500 MG tablet     betamethasone dipropionate (DIPROSONE) 0.05 % external cream     bisacodyl (DULCOLAX) 10 MG suppository     calcium carbonate (TUMS) 500 MG chewable tablet     carbidopa-levodopa (SINEMET)  MG tablet     cholecalciferol (VITAMIN D3) 1000 units (25 mcg) capsule     diclofenac (VOLTAREN) 1 % topical gel     DULoxetine (CYMBALTA) 30 MG capsule     gabapentin (NEURONTIN) 100 MG capsule     HYDROcodone-acetaminophen (NORCO) 5-325 MG tablet     levothyroxine (SYNTHROID/LEVOTHROID) 137 MCG tablet     linaclotide (LINZESS) 72 MCG capsule     losartan (COZAAR) 50 MG tablet     mineral oil enema     Multiple Vitamin (TAB-A-KATIA) TABS     omeprazole (PRILOSEC OTC) 20 MG EC tablet     omeprazole (PRILOSEC) 20 MG DR capsule     polyethylene glycol (MIRALAX) powder      polyethylene glycol (MIRALAX/GLYCOLAX) packet     propylene glycol (SYSTANE BALANCE) 0.6 % SOLN ophthalmic solution     ranitidine (ZANTAC) 150 MG tablet     sertraline (ZOLOFT) 25 MG tablet     Simethicone (GAS RELIEF) 125 MG CAPS     SM PAIN RELIEVER EX  MG tablet     triamcinolone (KENALOG) 0.1 % external cream     VITAMIN D3 1000 units tablet     Zinc Oxide (DESITIN) 13 % CREA     No current facility-administered medications for this visit.        Examination:  There were no vitals taken for this visit.   179/92 lying, 147/89 standing  General- no distress, no rash, good pulses, no edema  Respiratory-auscultation over the anterior lung fields is clear  Cardiac- regular rate and rhythm    Neurologic  Mental status  Patient is alert, appropriate, speech is fluent and comprehension is intact    Cranial nerve testing  Pupils are equal and reactive to light, visual fields are full to confrontation bilaterally  Extraocular movements are full  Facial sensation is intact, face is symmetric with rest and activation  Palate rises symmetrically, tongue protrudes at midline with normal movements  Sterocleidomastoid and trapezius strength is normal and symmetric    Motor  Bulk normal.  Tone increased 1+ bilaterally arms and legs  Strength is 5/5 shoulder abduction, finger abduction, arm flexion and extension, hip flexion, plantar and dorsiflexion    Sensation  Intact to pin, vibration   Proprioception intact in great toes and fingers    Tendon reflexes  Testing at brachioradialis, biceps, triceps, patella and achilles bilaterally showed normal reflexes, symmetrically, without Babinski or Astorga signs    Coordination  Finger nose finger testing: normalRapid alternating movements -2/-2 fingers, -1/-1 hands, -2/-2 feet.  Gait abnormal stands with help of 1.  Small steps unstable.  No freezing  No tremor  Masked face -2    25 minutes of total time was spent face-to-face with the patient over 50% of which was  counseling..    Answers for HPI/ROS submitted by the patient on 5/14/2019   General Symptoms: Yes  Skin Symptoms: Yes  HENT Symptoms: Yes  EYE SYMPTOMS: Yes  HEART SYMPTOMS: Yes  LUNG SYMPTOMS: Yes  INTESTINAL SYMPTOMS: Yes  URINARY SYMPTOMS: Yes  GYNECOLOGIC SYMPTOMS: No  BREAST SYMPTOMS: No  SKELETAL SYMPTOMS: Yes  BLOOD SYMPTOMS: No  NERVOUS SYSTEM SYMPTOMS: Yes  MENTAL HEALTH SYMPTOMS: Yes  Fever: No  Loss of appetite: Yes  Weight loss: Yes  Weight gain: No  Fatigue: Yes  Night sweats: No  Chills: Yes  Increased stress: Yes  Excessive hunger: No  Excessive thirst: Yes  Feeling hot or cold when others believe the temperature is normal: Yes  Loss of height: Yes  Post-operative complications: No  Surgical site pain: No  Hallucinations: No  Change in or Loss of Energy: Yes  Hyperactivity: Yes  Confusion: Yes  Changes in hair: Yes  Changes in moles/birth marks: No  Itching: Yes  Rashes: No  Changes in nails: No  Acne: No  Hair in places you don't want it: Yes  Change in facial hair: Yes  Warts: No  Non-healing sores: No  Scarring: No  Flaking of skin: Yes  Color changes of hands/feet in cold : No  Sun sensitivity: Yes  Skin thickening: No  Ear pain: Yes  Ear discharge: No  Hearing loss: Yes  Tinnitus: Yes  Nosebleeds: No  Congestion: No  Sinus pain: No  Trouble swallowing: No   Voice hoarseness: Yes  Mouth sores: No  Sore throat: No  Tooth pain: No  Gum tenderness: Yes  Bleeding gums: No  Change in taste: Yes  Change in sense of smell: Yes  Dry mouth: Yes  Hearing aid used: No  Neck lump: No  Eye pain: Yes  Vision loss: Yes  Dry eyes: Yes  Watery eyes: Yes  Eye bulging: No  Double vision: Yes  Flashing of lights: Yes  Spots: Yes  Floaters: Yes  Redness: Yes  Crossed eyes: Yes  Tunnel Vision: No  Yellowing of eyes: No  Eye irritation: No  Cough: Yes  Sputum or phlegm: No  Coughing up blood: No  Difficulty breating or shortness of breath: Yes  Snoring: No  Wheezing: No  Difficulty breathing on exertion: No  Nighttime  Cough: No  Difficulty breathing when lying flat: Yes  Chest pain or pressure: No  Fast or irregular heartbeat: No  Pain in legs with walking: Yes  Trouble breathing while lying down: Yes  Fingers or toes appear blue: No  High blood pressure: Yes  Low blood pressure: Yes  Fainting: No  Murmurs: Yes  Pacemaker: No  Varicose veins: No  Edema or swelling: Yes  Wake up at night with shortness of breath: No  Light-headedness: Yes  Exercise intolerance: Yes  Heart burn or indigestion: Yes  Nausea: Yes  Vomiting: No  Abdominal pain: Yes  Bloating: Yes  Constipation: Yes  Diarrhea: Yes  Blood in stool: No  Black stools: No  Rectal or Anal pain: No  Fecal incontinence: No  Yellowing of skin or eyes: No  Vomit with blood: No  Change in stools: Yes  Trouble holding urine or incontinence: Yes  Pain or burning: Yes  Trouble starting or stopping: Yes  Increased frequency of urination: Yes  Blood in urine: No  Decreased frequency of urination: No  Frequent nighttime urination: Yes  Difficulty emptying bladder: Yes  Back pain: Yes  Muscle aches: Yes  Neck pain: Yes  Swollen joints: Yes  Joint pain: Yes  Bone pain: Yes  Muscle cramps: Yes  Muscle weakness: Yes  Joint stiffness: Yes  Bone fracture: No  Trouble with coordination: Yes  Dizziness or trouble with balance: Yes  Fainting or black-out spells: No  Memory loss: Yes  Headache: Yes  Seizures: No  Speech problems: No  Tingling: Yes  Tremor: No  Weakness: Yes  Difficulty walking: Yes  Paralysis: No  Numbness: Yes  Nervous or Anxious: Yes  Depression: Yes  Trouble sleeping: Yes  Trouble thinking or concentrating: Yes  Mood changes: Yes  Panic attacks: Yes

## 2019-05-14 NOTE — LETTER
5/14/2019       RE: Mehreen Camara  [facility]  [address]  [facility] [address]     Dear Colleague,    Thank you for referring your patient, Mehreen Camara, to the [facility] NEUROLOGY at [facility]. Please see a copy of my visit note below.    Movement Disorder Clinic follow up note    Patient: Mehreen Camara  Medical Record Number: [id number]  Encounter Date: May 14, 2019  PCP:Cathy Sargent    CC: Parkinsonism    Impression:   1.  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  Orthostatic hypotension without fainting  3.  Increased fatigue and generalized weakness.    Recommendations:  1.  The patient continues to have parkinsonism with minimal response to levodopa.  On past studies she has some pontine T2 signal change on her MRI.  The autonomic studies done by Dr. Ba showed severe autonomic dysfunction.  She continues to have severe orthostatic hypotension.  All of this suggests multiple system atrophy-P rather than idiopathic Parkinson's disease.  Thankfully however there have been no signs of major change on her objective exam today.  2.  The patient does have a feeling of increasing weakness.  On examination there are no signs of upper motor neuron or lower motor neuron weakness.  I think this reflects progression of her parkinsonism.  I do not think increasing her levodopa dosage is going to improve this.  3.  Thankfully she is not fainting.  She does feel weak when she stands up.  Unfortunately we have no blood pressure records from her assisted living records.  Today in our office her blood pressure looks good with some supine hypertension but good standing blood pressures.  We will continue to manage this with fluids and elevation of the head of the bed.  Florinef and midodrine would not be indicated at this time.  4.  Discussed in full.  See back in 6 months    Return to clinic: 6 months    Interval Hx:: Ms. Mehreen OBRIEN  Jax returns to clinic today for follow-up of her atypical parkinsonism.  She says overall she feels weaker.  Her legs in particular are feeling weaker.  She says she feels weak all over however.  This is happening in the absence of any clear paralysis, fasciculation or atrophy.  Thankfully she is not falling.  She is living in an assisted living center.  They are taking her blood pressures.  It sounds as if her major problem is supine hypertension.  She does not believe that her standing blood pressures often fall below 100 systolic.  She is not fainting.  She does feel weak when she stands up.    She has very bad constipation.  She is doing the right things.  She is drinking 7 cups of fluid.  She is using daily fiber.  She is using MiraLAX daily.  At times however it sounds as if she gets impacted.    All of her movements are slow but she has no tremor.    She is taking carbidopa/levodopa, 25/100, 2 tablets 3 times a day.  She is noticing no bad effects from this.    Her blood pressure records from assisted living are not available today.  She will fax them to me.    Current medications    Movement Disorder-related Medications                   am noon pm     Carbidopa/levodopa 25/100                                                2 2 2                                                                                 Current Outpatient Medications   Medication     acetaminophen (TYLENOL) 500 MG tablet     acetaminophen (TYLENOL) 500 MG tablet     betamethasone dipropionate (DIPROSONE) 0.05 % external cream     bisacodyl (DULCOLAX) 10 MG suppository     calcium carbonate (TUMS) 500 MG chewable tablet     carbidopa-levodopa (SINEMET)  MG tablet     cholecalciferol (VITAMIN D3) 1000 units (25 mcg) capsule     diclofenac (VOLTAREN) 1 % topical gel     DULoxetine (CYMBALTA) 30 MG capsule     gabapentin (NEURONTIN) 100 MG capsule     HYDROcodone-acetaminophen (NORCO) 5-325 MG tablet     levothyroxine  (SYNTHROID/LEVOTHROID) 137 MCG tablet     linaclotide (LINZESS) 72 MCG capsule     losartan (COZAAR) 50 MG tablet     mineral oil enema     Multiple Vitamin (TAB-A-KATIA) TABS     omeprazole (PRILOSEC OTC) 20 MG EC tablet     omeprazole (PRILOSEC) 20 MG DR capsule     polyethylene glycol (MIRALAX) powder     polyethylene glycol (MIRALAX/GLYCOLAX) packet     propylene glycol (SYSTANE BALANCE) 0.6 % SOLN ophthalmic solution     ranitidine (ZANTAC) 150 MG tablet     sertraline (ZOLOFT) 25 MG tablet     Simethicone (GAS RELIEF) 125 MG CAPS     SM PAIN RELIEVER EX  MG tablet     triamcinolone (KENALOG) 0.1 % external cream     VITAMIN D3 1000 units tablet     Zinc Oxide (DESITIN) 13 % CREA     No current facility-administered medications for this visit.        Examination:  There were no vitals taken for this visit.   179/92 lying, 147/89 standing  General- no distress, no rash, good pulses, no edema  Respiratory-auscultation over the anterior lung fields is clear  Cardiac- regular rate and rhythm    Neurologic  Mental status  Patient is alert, appropriate, speech is fluent and comprehension is intact    Cranial nerve testing  Pupils are equal and reactive to light, visual fields are full to confrontation bilaterally  Extraocular movements are full  Facial sensation is intact, face is symmetric with rest and activation  Palate rises symmetrically, tongue protrudes at midline with normal movements  Sterocleidomastoid and trapezius strength is normal and symmetric    Motor  Bulk normal.  Tone increased 1+ bilaterally arms and legs  Strength is 5/5 shoulder abduction, finger abduction, arm flexion and extension, hip flexion, plantar and dorsiflexion    Sensation  Intact to pin, vibration   Proprioception intact in great toes and fingers    Tendon reflexes  Testing at brachioradialis, biceps, triceps, patella and achilles bilaterally showed normal reflexes, symmetrically, without Babinski or Astorga  signs    Coordination  Finger nose finger testing: normalRapid alternating movements -2/-2 fingers, -1/-1 hands, -2/-2 feet.  Gait abnormal stands with help of 1.  Small steps unstable.  No freezing  No tremor  Masked face -2    25 minutes of total time was spent face-to-face with the patient over 50% of which was counseling..    Answers for HPI/ROS submitted by the patient on 5/14/2019   General Symptoms: Yes  Skin Symptoms: Yes  HENT Symptoms: Yes  EYE SYMPTOMS: Yes  HEART SYMPTOMS: Yes  LUNG SYMPTOMS: Yes  INTESTINAL SYMPTOMS: Yes  URINARY SYMPTOMS: Yes  GYNECOLOGIC SYMPTOMS: No  BREAST SYMPTOMS: No  SKELETAL SYMPTOMS: Yes  BLOOD SYMPTOMS: No  NERVOUS SYSTEM SYMPTOMS: Yes  MENTAL HEALTH SYMPTOMS: Yes  Fever: No  Loss of appetite: Yes  Weight loss: Yes  Weight gain: No  Fatigue: Yes  Night sweats: No  Chills: Yes  Increased stress: Yes  Excessive hunger: No  Excessive thirst: Yes  Feeling hot or cold when others believe the temperature is normal: Yes  Loss of height: Yes  Post-operative complications: No  Surgical site pain: No  Hallucinations: No  Change in or Loss of Energy: Yes  Hyperactivity: Yes  Confusion: Yes  Changes in hair: Yes  Changes in moles/birth marks: No  Itching: Yes  Rashes: No  Changes in nails: No  Acne: No  Hair in places you don't want it: Yes  Change in facial hair: Yes  Warts: No  Non-healing sores: No  Scarring: No  Flaking of skin: Yes  Color changes of hands/feet in cold : No  Sun sensitivity: Yes  Skin thickening: No  Ear pain: Yes  Ear discharge: No  Hearing loss: Yes  Tinnitus: Yes  Nosebleeds: No  Congestion: No  Sinus pain: No  Trouble swallowing: No   Voice hoarseness: Yes  Mouth sores: No  Sore throat: No  Tooth pain: No  Gum tenderness: Yes  Bleeding gums: No  Change in taste: Yes  Change in sense of smell: Yes  Dry mouth: Yes  Hearing aid used: No  Neck lump: No  Eye pain: Yes  Vision loss: Yes  Dry eyes: Yes  Watery eyes: Yes  Eye bulging: No  Double vision: Yes  Flashing of  lights: Yes  Spots: Yes  Floaters: Yes  Redness: Yes  Crossed eyes: Yes  Tunnel Vision: No  Yellowing of eyes: No  Eye irritation: No  Cough: Yes  Sputum or phlegm: No  Coughing up blood: No  Difficulty breating or shortness of breath: Yes  Snoring: No  Wheezing: No  Difficulty breathing on exertion: No  Nighttime Cough: No  Difficulty breathing when lying flat: Yes  Chest pain or pressure: No  Fast or irregular heartbeat: No  Pain in legs with walking: Yes  Trouble breathing while lying down: Yes  Fingers or toes appear blue: No  High blood pressure: Yes  Low blood pressure: Yes  Fainting: No  Murmurs: Yes  Pacemaker: No  Varicose veins: No  Edema or swelling: Yes  Wake up at night with shortness of breath: No  Light-headedness: Yes  Exercise intolerance: Yes  Heart burn or indigestion: Yes  Nausea: Yes  Vomiting: No  Abdominal pain: Yes  Bloating: Yes  Constipation: Yes  Diarrhea: Yes  Blood in stool: No  Black stools: No  Rectal or Anal pain: No  Fecal incontinence: No  Yellowing of skin or eyes: No  Vomit with blood: No  Change in stools: Yes  Trouble holding urine or incontinence: Yes  Pain or burning: Yes  Trouble starting or stopping: Yes  Increased frequency of urination: Yes  Blood in urine: No  Decreased frequency of urination: No  Frequent nighttime urination: Yes  Difficulty emptying bladder: Yes  Back pain: Yes  Muscle aches: Yes  Neck pain: Yes  Swollen joints: Yes  Joint pain: Yes  Bone pain: Yes  Muscle cramps: Yes  Muscle weakness: Yes  Joint stiffness: Yes  Bone fracture: No  Trouble with coordination: Yes  Dizziness or trouble with balance: Yes  Fainting or black-out spells: No  Memory loss: Yes  Headache: Yes  Seizures: No  Speech problems: No  Tingling: Yes  Tremor: No  Weakness: Yes  Difficulty walking: Yes  Paralysis: No  Numbness: Yes  Nervous or Anxious: Yes  Depression: Yes  Trouble sleeping: Yes  Trouble thinking or concentrating: Yes  Mood changes: Yes  Panic attacks: Yes      Again,  thank you for allowing me to participate in the care of your patient.      Sincerely,    Chris Monterroso MD

## 2019-05-14 NOTE — PROGRESS NOTES
[facility]  [place] Medical Record Number:  [id number]  Place of Service where encounter took place:  [facility] ([facility]) [[id number]]  Chief Complaint   Patient presents with     Nursing Home Acute       HPI:    Mehreen Camara  is a 79 year old (1940), who is being seen today for an episodic care visit.  HPI information obtained from: patient report and family/first contact ,senait Villalpando. Today's concern is:     Adjustment disorder with mixed anxiety and depressed mood  Multiple system atrophy (H)  Chronic bilateral low back pain with bilateral sciatica  Chronic idiopathic constipation  Flatulence, eructation, and gas pain     Mehreen continues to report pain in low back limiting her movements. She also reports improved abdominal pain since she has been refusing Linzness last 2-3 days.  She reports ongoing pain, depression and anxiety which is limiting her ability to participate in activities about the assisted living facility. She has found no benefit in pain with change to duloxetine, and continues with significant depression and anxiety. She has follow up with neuro today and has followed with [facility] recently    Past Medical and Surgical History reviewed in Epic today.    MEDICATIONS:  Current Outpatient Medications   Medication Sig Dispense Refill     acetaminophen (TYLENOL) 500 MG tablet Take 1-2 tablets (500-1,000 mg) by mouth 3 times daily as needed for mild pain  0     acetaminophen (TYLENOL) 500 MG tablet Take 1 tablet (500 mg) by mouth 3 times daily 90 tablet 1     betamethasone dipropionate (DIPROSONE) 0.05 % external cream Apply topically 2 times daily as needed       bisacodyl (DULCOLAX) 10 MG suppository Place 10 mg rectally every 3 days As needed for constipation       calcium carbonate (TUMS) 500 MG chewable tablet Take 1 tablet (500 mg) by mouth every 2 hours as needed for heartburn 150 tablet 1     carbidopa-levodopa (SINEMET)  MG tablet Take 2 tablets by mouth 3  times daily       cholecalciferol (VITAMIN D3) 1000 units (25 mcg) capsule Take 2 capsules by mouth daily       diclofenac (VOLTAREN) 1 % topical gel Apply 4 g topically 4 times daily as needed for moderate pain (to back and neck) 100 g 11     DULoxetine (CYMBALTA) 30 MG capsule TAKE 1 CAPSULE BY MOUTH EVERY DAY DX DEPRESSION 30 capsule 0     gabapentin (NEURONTIN) 100 MG capsule TAKE 2 CAPSULES (200MG) BY MOUTH THREE TIMES DAILY DX NEUROPATHY 180 capsule 11     levothyroxine (SYNTHROID/LEVOTHROID) 137 MCG tablet TAKE 1 TABLET BY MOUTH EVERY DAY DX HYPOTHYROIDISM 30 tablet 11     linaclotide (LINZESS) 72 MCG capsule Take 72 mcg by mouth every morning (before breakfast)       losartan (COZAAR) 50 MG tablet TAKE TABLET BY MOUTH EVERY MORNING 30 tablet 11     mineral oil enema Place 1 enema rectally as needed for constipation 1 enema 0     Multiple Vitamin (TAB-A-KATIA) TABS TAKE 1 TABLET BY MOUTH EVERY DAY FOR SUPPLEMENT, WEIGHT LOSS 100 tablet 11     omeprazole (PRILOSEC OTC) 20 MG EC tablet Take 20 mg by mouth 2 times daily       omeprazole (PRILOSEC) 20 MG DR capsule TAKE 1 CAPSULE BY MOUTH TWICE DAILY DX GASTROESOPHAGEAL REFLUX DISEASE 60 capsule 11     polyethylene glycol (MIRALAX) powder Take 17 g by mouth daily 510 g 1     polyethylene glycol (MIRALAX/GLYCOLAX) packet Take 17 g by mouth 2 times daily as needed for constipation       propylene glycol (SYSTANE BALANCE) 0.6 % SOLN ophthalmic solution Place 1 drop into both eyes 4 times daily as needed for dry eyes       ranitidine (ZANTAC) 150 MG tablet TAKE 1 TABLET BY MOUTH DAILY AT BEDTIME DX GASTROESOPHAGEAL REFLUX DISEASE 30 tablet 11     sertraline (ZOLOFT) 25 MG tablet Take 1 tablet (25 mg) by mouth daily 25 mg x 2 weeks, then 50 mg daily 60 tablet 0     Simethicone (GAS RELIEF) 125 MG CAPS Take 125 mg by mouth 4 times daily as needed (flatulence/gas)       triamcinolone (KENALOG) 0.1 % external cream Apply topically 2 times daily as needed for irritation        Zinc Oxide (DESITIN) 13 % CREA Twice daily to rashy area until resolved       HYDROcodone-acetaminophen (NORCO) 5-325 MG tablet Take 1 tablet by mouth every 6 hours as needed for severe pain 60 tablet 0     SM PAIN RELIEVER EX  MG tablet TAKE 1 TABLET BY MOUTH THREE TIMES DAILY FOR BACK PAIN (MAX APAP 3GM/24HRS);TAKE 1-2 TABLETS (500-1000MG) BY MOUTH THREE TIMES DAILY AS NEED 100 tablet 11     VITAMIN D3 1000 units tablet TAKE 2 TABLETS (2000IU) BY MOUTH EVERY DAY DX OSTEOPOROSIS 100 tablet 11     REVIEW OF SYSTEMS:  4 point ROS including Respiratory, CV, GI and , other than that noted in the HPI,  is negative    Objective:  /88   Pulse 81   Temp 98.5  F (36.9  C)   Resp 18   Wt 64.5 kg (142 lb 3.2 oz)   SpO2 96%   BMI 22.95 kg/m    Exam:  GENERAL APPEARANCE:  Alert, in no distress   CHEST/RESP:  respiratory effort normal , no respiratory distress  CV:   no peripheral edema   PSYCH:  Alert and oriented to person-place-time , affect anxious, judgement appropriate       Labs:   Recent labs in CMP.LY reviewed by me today.     ASSESSMENT/PLAN:  Adjustment disorder with mixed anxiety and depressed mood  Patient has had no benefit from duloxetine and there is minimal room for titration.  She had no benefit from previous use of celexa.  She reports she did feel better when taking sertraline, but this was stopped due to fear of increased hypotension.  However, it seems that her anxiety and depression is contributing to her low back pain, and constipation/IBS.   -titrate off duloxetine and start low dose sertraline  -monitor for worsening hypotension     Multiple system atrophy (H)  Appears stable, seeing neurology today and appreciate those recommendations.     Chronic bilateral low back pain with bilateral sciatica  She continues with LBP, feels tylenol and norco are helpful, she is not sure if the gabapentin is helpful or not.  She would like better pain control, but is reluctant to continue to take  the norco. She is using diclofenac, etc as well.      Chronic idiopathic constipation  Flatulence, eructation, and gas pain  Chronic and bothersome.  She has been using linzness per recommendations of [facility], but did not find it helpful.  She thinks it contributes to her lower abdominal pain.  She has refused it last several days and feels a bit better.  -hold linzness for now      Orders written by provider at facility and transcribed by : Te Sargent  1. Hold Linzness for now  2. Outpatient PT for pain control and OT for cognitive testing, Med Management  3. discontinue Duloxetine  4. Start Sertraline 25 daily x2 weeks then start 50 mg PO every day       Electronically signed by:  HAILEE Contreras CNP

## 2019-05-15 ENCOUNTER — DOCUMENTATION ONLY (OUTPATIENT)
Dept: NEUROLOGY | Facility: CLINIC | Age: 79
End: 2019-05-15

## 2019-05-15 NOTE — PROGRESS NOTES
Received records from [facility] 5/15/19, records were sent to be scanned 5/15/19    Gia Mercado CMA

## 2019-05-28 ENCOUNTER — TRANSFERRED RECORDS (OUTPATIENT)
Dept: HEALTH INFORMATION MANAGEMENT | Facility: CLINIC | Age: 79
End: 2019-05-28

## 2019-06-10 DIAGNOSIS — M54.41 CHRONIC BILATERAL LOW BACK PAIN WITH BILATERAL SCIATICA: ICD-10-CM

## 2019-06-10 DIAGNOSIS — G89.29 CHRONIC BILATERAL LOW BACK PAIN WITH BILATERAL SCIATICA: ICD-10-CM

## 2019-06-10 DIAGNOSIS — M54.42 CHRONIC BILATERAL LOW BACK PAIN WITH BILATERAL SCIATICA: ICD-10-CM

## 2019-06-10 RX ORDER — HYDROCODONE BITARTRATE AND ACETAMINOPHEN 5; 325 MG/1; MG/1
1 TABLET ORAL EVERY 4 HOURS PRN
Qty: 60 TABLET | Refills: 0 | Status: SHIPPED | OUTPATIENT
Start: 2019-06-10 | End: 2019-07-01

## 2019-06-25 DIAGNOSIS — N81.6 RECTOCELE: Primary | ICD-10-CM

## 2019-06-26 NOTE — PROGRESS NOTES
[facility] TELEPHONE ENCOUNTER    Mehreen Camara is a 79 year old  (1940), Family called today to report: Mehreen has been seen by the follow doctor, who has found a rectocele.  Her recommendation is that Mehreen be fitted for a pessary.    Family would like referral to GYN    Dr. Suzanne Yates with [facility],   [address], [place] [address].     ASSESSMENT/PLAN  OK for referral to GYN    Placed referral for GYN for pessary    HAILEE Contreras CNP

## 2019-06-28 ENCOUNTER — APPOINTMENT (OUTPATIENT)
Dept: GENERAL RADIOLOGY | Facility: CLINIC | Age: 79
End: 2019-06-28
Attending: EMERGENCY MEDICINE
Payer: COMMERCIAL

## 2019-06-28 ENCOUNTER — HOSPITAL ENCOUNTER (EMERGENCY)
Facility: CLINIC | Age: 79
Discharge: HOME | End: 2019-06-28
Attending: EMERGENCY MEDICINE | Admitting: EMERGENCY MEDICINE
Payer: COMMERCIAL

## 2019-06-28 ENCOUNTER — APPOINTMENT (OUTPATIENT)
Dept: ULTRASOUND IMAGING | Facility: CLINIC | Age: 79
End: 2019-06-28
Attending: EMERGENCY MEDICINE
Payer: COMMERCIAL

## 2019-06-28 VITALS
DIASTOLIC BLOOD PRESSURE: 84 MMHG | HEART RATE: 82 BPM | WEIGHT: 145 LBS | RESPIRATION RATE: 14 BRPM | OXYGEN SATURATION: 100 % | BODY MASS INDEX: 23.4 KG/M2 | TEMPERATURE: 97.9 F | SYSTOLIC BLOOD PRESSURE: 186 MMHG

## 2019-06-28 DIAGNOSIS — M13.872 ALLERGIC ARTHRITIS OF LEFT ANKLE: ICD-10-CM

## 2019-06-28 PROCEDURE — 73610 X-RAY EXAM OF ANKLE: CPT | Mod: LT

## 2019-06-28 PROCEDURE — 99284 EMERGENCY DEPT VISIT MOD MDM: CPT | Mod: 25 | Performed by: EMERGENCY MEDICINE

## 2019-06-28 PROCEDURE — 93971 EXTREMITY STUDY: CPT | Mod: LT

## 2019-06-28 PROCEDURE — 99284 EMERGENCY DEPT VISIT MOD MDM: CPT | Mod: Z6 | Performed by: EMERGENCY MEDICINE

## 2019-06-28 NOTE — ED AVS SNAPSHOT
[facility] Emergency Department  [address] [address]  Phone:  [phone]  Fax:  [phone]                                    Mehreen Camara   MRN: [id number]    Department:  [facility] Emergency Department   Date of Visit:  6/28/2019           After Visit Summary Signature Page    I have received my discharge instructions, and my questions have been answered. I have discussed any challenges I see with this plan with the nurse or doctor.    ..........................................................................................................................................  Patient/Patient Representative Signature      ..........................................................................................................................................  Patient Representative Print Name and Relationship to Patient    ..................................................               ................................................  Date                                   Time    ..........................................................................................................................................  Reviewed by Signature/Title    ...................................................              ..............................................  Date                                               Time          22EPIC Rev 08/18

## 2019-06-28 NOTE — ED PROVIDER NOTES
"  History     Chief Complaint   Patient presents with     Leg Pain     leg ankle, pain with leg swelling     HPI  Mehreen Camara is a 79 year old female who presents to the ED for evaluation of left lower leg pain. The patient currently lives in an assisted living community, and staff have been watching her left lower leg for the past week. The patient reports chronic discomfort in her \"left ankle bone\", but now is experiencing worsened pain radiating to her left foot and up her left calf to her knee. She reports that this pain worsens with ambulation, and that she has noticed swelling in her left calf. The patient has a history of osteoarthrosis.      Allergies:  Allergies   Allergen Reactions     Penicillins Anaphylaxis, Rash and Shortness Of Breath     Aspirin Nausea     Atenolol Cough     Atorvastatin Muscle Pain (Myalgia)     Bupropion Nausea     Clindamycin Other (See Comments)     Constipation      Codeine Nausea     Ibuprofen Nausea     Stomach upset     Lovastatin Muscle Pain (Myalgia)     Cephalexin Rash     Neck reddness       Problem List:    Patient Active Problem List    Diagnosis Date Noted     Post-op pain 12/12/2018     Priority: Medium     S/P laparoscopic cholecystectomy 12/12/2018     Priority: Medium     Multiple system atrophy (H) 11/14/2018     Priority: Medium     Movement disorder 09/19/2018     Priority: Medium     Rheumatic aortic stenosis 09/19/2018     Priority: Medium     Disorder of bursae and tendons in shoulder region 09/18/2018     Priority: Medium     Overview:   Created by Conversion    Replacement Utility updated for latest IMO load       Adjustment disorder with anxious mood 09/17/2018     Priority: Medium     Adjustment disorder with mixed anxiety and depressed mood 09/17/2018     Priority: Medium     Aneurysm of abdominal vessel (H) 09/17/2018     Priority: Medium     Overview:   Created by Conversion  [facility] Annotation: Jun 8 2011  8:07AM - Danni Ortiz: 4.4 on " 11/2013.    Needs 6-12 month u/s or CT.    Replacement Utility updated for latest IMO load       Harris's esophagus 09/17/2018     Priority: Medium     Overview:   Created by Conversion  [facility] Annotation: Feb  3 2012  8:32AM Cameron Eisenberg: Needs EGD 2/2017       Bradycardia 09/17/2018     Priority: Medium     Cataract 09/17/2018     Priority: Medium     Overview:   Created by Conversion       Major depressive disorder, recurrent episode (H) 09/17/2018     Priority: Medium     Overview:   Created by Conversion    Replacement Utility updated for latest IMO load       Constipation 09/17/2018     Priority: Medium     Dizziness 09/17/2018     Priority: Medium     Overview:   Created by Conversion       Dyslipidemia 09/17/2018     Priority: Medium     Overview:   Created by Conversion       Esophageal reflux 09/17/2018     Priority: Medium     Overview:   Created by Conversion       Hypertension 09/17/2018     Priority: Medium     Overview:   Created by Conversion    Replacement Utility updated for latest IMO load       Hypothyroidism 09/17/2018     Priority: Medium     Overview:   Created by Conversion    Replacement Utility updated for latest IMO load       Insomnia due to anxiety and fear 09/17/2018     Priority: Medium     Lower back pain 09/17/2018     Priority: Medium     Osteoarthrosis 09/17/2018     Priority: Medium     Overview:   Created by Conversion    Replacement Utility updated for latest IMO load       Disorder of bone and cartilage 09/17/2018     Priority: Medium     Overview:   Created by Conversion  [facility] Annotation: Dec 13 2012  7:41AM Roberta Goodwin: vit D/Ca, f/u   Dexa needed 1/2014.    Replacement Utility updated for latest IMO load       Lower extremity weakness 07/04/2018     Priority: Medium     Orthostatic hypotension dysautonomic syndrome (H) 09/22/2017     Priority: Medium     Primary insomnia 07/04/2017     Priority: Medium     Urinary incontinence in female 05/07/2017      Priority: Medium     Weakness 2017     Priority: Medium     S/P AAA repair using bifurcation graft 2015     Priority: Medium     Adrenal adenoma 2015     Priority: Medium     Overview:   Current hormonal evaluation through endocrinology          Past Medical History:    Past Medical History:   Diagnosis Date     Adrenal adenoma      Arthritis      Depressive disorder      Hypertension      Rheumatic fever      Small bowel obstruction (H)      Thyroid disease        Past Surgical History:    Past Surgical History:   Procedure Laterality Date     AAA REPAIR      wt bifurcation graft     CARPAL TUNNEL RELEASE RT/LT Bilateral 2013     HYSTERECTOMY  2013     JOINT REPLACEMENT Right 2003     LAPAROSCOPIC CHOLECYSTECTOMY N/A 2018    Procedure: LAPAROSCOPIC CHOLECYSTECTOMY;  Surgeon: Amadeo Sebastian MD;  Location: [facility] OR     ORTHOPEDIC SURGERY       SPINE SURGERY      cervical spine fusion     THYROIDECTOMY         Family History:    Family History   Problem Relation Age of Onset     Hypertension Mother      Coronary Artery Disease Mother      Coronary Artery Disease Father      Other Cancer Brother      Parkinsonism Other        Social History:  Marital Status:   [5]  Social History     Tobacco Use     Smoking status: Former Smoker     Packs/day: 0.50     Types: Cigarettes     Last attempt to quit: 1994     Years since quittin.5     Smokeless tobacco: Never Used   Substance Use Topics     Alcohol use: No     Drug use: No        Medications:      acetaminophen (TYLENOL) 500 MG tablet   acetaminophen (TYLENOL) 500 MG tablet   betamethasone dipropionate (DIPROSONE) 0.05 % external cream   bisacodyl (DULCOLAX) 10 MG suppository   calcium carbonate (TUMS) 500 MG chewable tablet   carbidopa-levodopa (SINEMET)  MG tablet   cholecalciferol (VITAMIN D3) 1000 units (25 mcg) capsule   diclofenac (VOLTAREN) 1 % topical gel   gabapentin (NEURONTIN) 100 MG capsule    HYDROcodone-acetaminophen (NORCO) 5-325 MG tablet   levothyroxine (SYNTHROID/LEVOTHROID) 137 MCG tablet   linaclotide (LINZESS) 72 MCG capsule   losartan (COZAAR) 50 MG tablet   mineral oil enema   Multiple Vitamin (TAB-A-KATIA) TABS   omeprazole (PRILOSEC OTC) 20 MG EC tablet   omeprazole (PRILOSEC) 20 MG DR capsule   polyethylene glycol (MIRALAX) powder   polyethylene glycol (MIRALAX/GLYCOLAX) packet   propylene glycol (SYSTANE BALANCE) 0.6 % SOLN ophthalmic solution   ranitidine (ZANTAC) 150 MG tablet   sertraline (ZOLOFT) 25 MG tablet   Simethicone (GAS RELIEF) 125 MG CAPS   SM PAIN RELIEVER EX  MG tablet   triamcinolone (KENALOG) 0.1 % external cream   VITAMIN D3 1000 units tablet   Zinc Oxide (DESITIN) 13 % CREA         Review of Systems  Problem focused review of systems otherwise negative    Physical Exam   BP: 186/84  Pulse: 82  Temp: 97.9  F (36.6  C)  Resp: 14  Weight: 65.8 kg (145 lb)  SpO2: 100 %      Physical Exam  Nontoxic-appearing no respiratory distress alert and oriented  Examination left lower extremity shows ankle swelling mainly over the lateral malleolus, is localized tenderness of the entire lower leg including the popliteal fossa, the calf, the tibia and fibula throughout their course, the ankle and the foot.  The pulses are intact sensation is intact, proximal fifth metatarsal is nontender, Achilles is intact and calcaneus is intact.  There is some scaling and some scant erythema over the heel but no out right cellulitic change appreciated.  Certainly no warmth or induration.  There does not appear to be an ankle effusion.  ED Course        Procedures               Critical Care time:  none               Results for orders placed or performed during the hospital encounter of 06/28/19 (from the past 24 hour(s))   XR Ankle Left G/E 3 Views    Narrative    LEFT ANKLE THREE VIEWS   6/28/2019 11:06 AM    HISTORY: Pain.    COMPARISON: None.    FINDINGS: No fracture or acute abnormality is  seen. Small spurs are  seen along the inferior tip of the fibula and also adjacent to the  medial aspect of the ankle joint. There are degenerative changes in  the visualized mid foot demonstrating the lateral view, including  joint space narrowing and dorsal osteophyte formation at the  talonavicular joint and the articulation of the navicular and  cuneiforms. Remaining joint spaces are well preserved. There are small  posterior and plantar calcaneal spurs. There is mild calcification in  the soft tissues plantar to the hindfoot. No other soft tissue  abnormality is seen.      Impression    IMPRESSION: Mild degenerative changes as described above.     ASHLEY PAULINO MD   US Lower Extremity Venous Duplex Left    Narrative    VENOUS ULTRASOUND LEFT LOWER EXTREMITY  6/28/2019 11:36 AM     HISTORY: Pain and swelling.    COMPARISON: None.    TECHNIQUE: Color Doppler and spectral waveform analysis performed  throughout the deep veins of the left lower extremity.    FINDINGS: The left common femoral, proximal greater saphenous,  femoral, and popliteal veins demonstrate normal blood flow,  compression, and augmentation. Posterior tibial and peroneal veins are  compressible. Contralateral right common femoral vein is patent.      Impression    IMPRESSION: Negative for deep venous thrombosis in the left lower  extremity.    DAMIEN OROPEZA MD       Medications - No data to display       10:35 Patient assessed.      Assessments & Plan (with Medical Decision Making)  79-year-old female presents with worsened left ankle pain and left lower extremity pain from the knee distally.  Differential considered including but not limited to deep venous thrombosis, Baker's cyst, sprain, bony lesion, joint effusion/septic joint, gout, cellulitis, plantar fasciitis versus other.  Left lower extremity ultrasound is negative for DVT, left ankle x-ray exam negative for acute finding, by my read as well as radiology is significant for  mild/moderate degenerative change.  At this point in time is unclear the exact etiology of the patient's discomfort and swelling, and localizes well to the distal lateral ankle.  Is most likely secondary to degenerative joint disease.  Recommend follow-up physical therapy, and primary care.  Elevate and ice.  Return here for redness, progressive swelling pain or any other concern.     I have reviewed the nursing notes.    I have reviewed the findings, diagnosis, plan and need for follow up with the patient.             Medication List      There are no discharge medications for this visit.         Final diagnoses:   Allergic arthritis of left ankle       This document serves as a record of the services and decisions personally performed and made by Jorge Dowd MD. It was created on HIS/HER behalf by Sara Mari, a trained medical scribe. The creation of this document is based the provider's statements to the medical scribe.  Sara Mari 10:35 AM 6/28/2019    Provider:   The information in this document, created by the medical scribe for me, accurately reflects the services I personally performed and the decisions made by me. I have reviewed and approved this document for accuracy prior to leaving the patient care area.  Jorge Dowd MD 10:35 AM 6/28/2019 6/28/2019   [facility] EMERGENCY DEPARTMENT     Jorge Dowd MD  06/28/19 1640

## 2019-06-28 NOTE — DISCHARGE INSTRUCTIONS
Tylenol as prescribed, elevate left ankle/leg, recommend evaluation and treatment by physical therapy    Return here for worsening pain, swelling, redness, fever or any other concern.

## 2019-07-01 DIAGNOSIS — M54.41 CHRONIC BILATERAL LOW BACK PAIN WITH BILATERAL SCIATICA: ICD-10-CM

## 2019-07-01 DIAGNOSIS — M54.42 CHRONIC BILATERAL LOW BACK PAIN WITH BILATERAL SCIATICA: ICD-10-CM

## 2019-07-01 DIAGNOSIS — G89.29 CHRONIC BILATERAL LOW BACK PAIN WITH BILATERAL SCIATICA: ICD-10-CM

## 2019-07-01 RX ORDER — HYDROCODONE BITARTRATE AND ACETAMINOPHEN 5; 325 MG/1; MG/1
TABLET ORAL
Qty: 60 TABLET | Refills: 0 | Status: SHIPPED | OUTPATIENT
Start: 2019-07-01 | End: 2019-07-23

## 2019-07-02 ENCOUNTER — ASSISTED LIVING VISIT (OUTPATIENT)
Dept: GERIATRICS | Facility: CLINIC | Age: 79
End: 2019-07-02
Payer: COMMERCIAL

## 2019-07-02 VITALS
RESPIRATION RATE: 16 BRPM | HEART RATE: 82 BPM | SYSTOLIC BLOOD PRESSURE: 165 MMHG | OXYGEN SATURATION: 96 % | BODY MASS INDEX: 23.44 KG/M2 | WEIGHT: 145.2 LBS | TEMPERATURE: 98.2 F | DIASTOLIC BLOOD PRESSURE: 85 MMHG

## 2019-07-02 DIAGNOSIS — L60.3 NAIL DYSTROPHY: ICD-10-CM

## 2019-07-02 DIAGNOSIS — F43.23 ADJUSTMENT DISORDER WITH MIXED ANXIETY AND DEPRESSED MOOD: ICD-10-CM

## 2019-07-02 DIAGNOSIS — L02.92 BOIL: Primary | ICD-10-CM

## 2019-07-02 DIAGNOSIS — F51.05 INSOMNIA DUE TO ANXIETY AND FEAR: ICD-10-CM

## 2019-07-02 DIAGNOSIS — F40.9 INSOMNIA DUE TO ANXIETY AND FEAR: ICD-10-CM

## 2019-07-02 DIAGNOSIS — N81.6 RECTOCELE: ICD-10-CM

## 2019-07-02 DIAGNOSIS — M15.0 PRIMARY OSTEOARTHRITIS INVOLVING MULTIPLE JOINTS: ICD-10-CM

## 2019-07-02 NOTE — PROGRESS NOTES
[place] [facility]  [place] Medical Record Number:  [id number]  Place of Service where encounter took place:  [facility] (On license of [facility]) [[id number]]  Chief Complaint   Patient presents with     RECHECK       HPI:    Mehreen Camara  is a 79 year old (1940), who is being seen today for an episodic care visit.  HPI information obtained from: {[facility] HPI:914724}. Today's concern is:  {[facility] DX:024217}    Past Medical and Surgical History reviewed in Epic today.    MEDICATIONS:  Current Outpatient Medications   Medication Sig Dispense Refill     acetaminophen (TYLENOL) 500 MG tablet Take 1-2 tablets (500-1,000 mg) by mouth 3 times daily as needed for mild pain  0     acetaminophen (TYLENOL) 500 MG tablet Take 1 tablet (500 mg) by mouth 3 times daily 90 tablet 1     betamethasone dipropionate (DIPROSONE) 0.05 % external cream Apply topically 2 times daily as needed       bisacodyl (DULCOLAX) 10 MG suppository Place 10 mg rectally every 3 days As needed for constipation       calcium carbonate (TUMS) 500 MG chewable tablet Take 1 tablet (500 mg) by mouth every 2 hours as needed for heartburn 150 tablet 1     carbidopa-levodopa (SINEMET)  MG tablet Take 2 tablets by mouth 3 times daily       diclofenac (VOLTAREN) 1 % topical gel Apply 4 g topically 4 times daily as needed for moderate pain (to back and neck) 100 g 11     gabapentin (NEURONTIN) 100 MG capsule TAKE 2 CAPSULES (200MG) BY MOUTH THREE TIMES DAILY DX NEUROPATHY 180 capsule 11     levothyroxine (SYNTHROID/LEVOTHROID) 137 MCG tablet TAKE 1 TABLET BY MOUTH EVERY DAY DX HYPOTHYROIDISM 30 tablet 11     mineral oil enema Place 1 enema rectally as needed for constipation 1 enema 0     Multiple Vitamin (TAB-A-KATIA) TABS TAKE 1 TABLET BY MOUTH EVERY DAY FOR SUPPLEMENT, WEIGHT LOSS 100 tablet 11     omeprazole (PRILOSEC) 20 MG DR capsule TAKE 1 CAPSULE BY MOUTH TWICE DAILY DX GASTROESOPHAGEAL REFLUX DISEASE 60 capsule 11     polyethylene glycol (MIRALAX)  powder Take 17 g by mouth daily 510 g 1     polyethylene glycol (MIRALAX/GLYCOLAX) packet Take 17 g by mouth 2 times daily as needed for constipation       propylene glycol (SYSTANE BALANCE) 0.6 % SOLN ophthalmic solution Place 1 drop into both eyes 4 times daily as needed for dry eyes       ranitidine (ZANTAC) 150 MG tablet TAKE 1 TABLET BY MOUTH DAILY AT BEDTIME DX GASTROESOPHAGEAL REFLUX DISEASE 30 tablet 11     sertraline (ZOLOFT) 25 MG tablet Take 1 tablet (25 mg) by mouth daily 25 mg x 2 weeks, then 50 mg daily 60 tablet 0     Simethicone (GAS RELIEF) 125 MG CAPS Take 125 mg by mouth 4 times daily as needed (flatulence/gas)       triamcinolone (KENALOG) 0.1 % external cream Apply topically 2 times daily as needed for irritation       VITAMIN D3 1000 units tablet TAKE 2 TABLETS (2000IU) BY MOUTH EVERY DAY DX OSTEOPOROSIS 100 tablet 11     Zinc Oxide (DESITIN) 13 % CREA Twice daily to rashy area until resolved       cholecalciferol (VITAMIN D3) 1000 units (25 mcg) capsule Take 2 capsules by mouth daily       HYDROcodone-acetaminophen (NORCO) 5-325 MG tablet TAKE 1 TABLET BY MOUTH EVERY 4 HOURS AS NEEDED FOR PAIN (MAX APAP 4GM/24HRS) 60 tablet 0     linaclotide (LINZESS) 72 MCG capsule Take 72 mcg by mouth every morning (before breakfast)       losartan (COZAAR) 50 MG tablet TAKE TABLET BY MOUTH EVERY MORNING 30 tablet 11     omeprazole (PRILOSEC OTC) 20 MG EC tablet Take 20 mg by mouth 2 times daily       SM PAIN RELIEVER EX  MG tablet TAKE 1 TABLET BY MOUTH THREE TIMES DAILY FOR BACK PAIN (MAX APAP 3GM/24HRS);TAKE 1-2 TABLETS (500-1000MG) BY MOUTH THREE TIMES DAILY AS NEED 100 tablet 11     ***    REVIEW OF SYSTEMS:  {ROS FGS:364357}    Objective:  /85   Pulse 82   Temp 98.2  F (36.8  C)   Resp 16   Wt 65.9 kg (145 lb 3.2 oz)   SpO2 96%   BMI 23.44 kg/m    Exam:  {Nursing home physical exam :020073}    Labs:   {fgslab:853427}    ASSESSMENT/PLAN:  {FGS  DX:134539}    {fgsorders:311338}  ***    {FGS TIME SPENT:553617}  Electronically signed by:  Te Sargent ***  {Providers Please double check the med list (in the plan section >> meds & orders tab) and Discontinue any of the meds flagged by the TC to be discontinued}

## 2019-07-02 NOTE — PROGRESS NOTES
[place] [facility]  [place] [facility] Record Number:  [id number]  Place of Service where encounter took place:  [facility] ([facility]) [[id number]]  Chief Complaint   Patient presents with     RECHECK       HPI:    Mehreen Camara  is a 79 year old (1940), who is being seen today for an episodic care visit.  HPI information obtained from: facility chart records, facility staff, patient report and [facility] chart review. Today's concern is:     Boil  Adjustment disorder with mixed anxiety and depressed mood  Rectocele  Primary osteoarthritis involving multiple joints  Nail dystrophy   Patient with ongoing abdominal/perineal pain and following with GYN, Pelvic floor specialty, and has recently been diagnosed with rectocele-seeing GYN on 7/5/2019 for pessary placement.  Today, she reports a painful area on her bottom, near rectum, which needs evaluation.  She also reports she has long, dystrophic nails which need trimming.     Past Medical and Surgical History reviewed in Epic today.    MEDICATIONS:  Current Outpatient Medications   Medication Sig Dispense Refill     acetaminophen (TYLENOL) 500 MG tablet Take 1-2 tablets (500-1,000 mg) by mouth 3 times daily as needed for mild pain  0     acetaminophen (TYLENOL) 500 MG tablet Take 1 tablet (500 mg) by mouth 3 times daily 90 tablet 1     betamethasone dipropionate (DIPROSONE) 0.05 % external cream Apply topically 2 times daily as needed       bisacodyl (DULCOLAX) 10 MG suppository Place 10 mg rectally every 3 days As needed for constipation       calcium carbonate (TUMS) 500 MG chewable tablet Take 1 tablet (500 mg) by mouth every 2 hours as needed for heartburn 150 tablet 1     carbidopa-levodopa (SINEMET)  MG tablet Take 2 tablets by mouth 3 times daily       diclofenac (VOLTAREN) 1 % topical gel Apply 4 g topically 4 times daily as needed for moderate pain (to back and neck) 100 g 11     gabapentin (NEURONTIN) 100 MG capsule TAKE 2 CAPSULES  (200MG) BY MOUTH THREE TIMES DAILY DX NEUROPATHY 180 capsule 11     levothyroxine (SYNTHROID/LEVOTHROID) 137 MCG tablet TAKE 1 TABLET BY MOUTH EVERY DAY DX HYPOTHYROIDISM 30 tablet 11     mineral oil enema Place 1 enema rectally as needed for constipation 1 enema 0     Multiple Vitamin (TAB-A-KATIA) TABS TAKE 1 TABLET BY MOUTH EVERY DAY FOR SUPPLEMENT, WEIGHT LOSS 100 tablet 11     omeprazole (PRILOSEC) 20 MG DR capsule TAKE 1 CAPSULE BY MOUTH TWICE DAILY DX GASTROESOPHAGEAL REFLUX DISEASE 60 capsule 11     polyethylene glycol (MIRALAX) powder Take 17 g by mouth daily 510 g 1     polyethylene glycol (MIRALAX/GLYCOLAX) packet Take 17 g by mouth 2 times daily as needed for constipation       propylene glycol (SYSTANE BALANCE) 0.6 % SOLN ophthalmic solution Place 1 drop into both eyes 4 times daily as needed for dry eyes       ranitidine (ZANTAC) 150 MG tablet TAKE 1 TABLET BY MOUTH DAILY AT BEDTIME DX GASTROESOPHAGEAL REFLUX DISEASE 30 tablet 11     sertraline (ZOLOFT) 25 MG tablet Take 1 tablet (25 mg) by mouth daily 25 mg x 2 weeks, then 50 mg daily 60 tablet 0     Simethicone (GAS RELIEF) 125 MG CAPS Take 125 mg by mouth 4 times daily as needed (flatulence/gas)       triamcinolone (KENALOG) 0.1 % external cream Apply topically 2 times daily as needed for irritation       VITAMIN D3 1000 units tablet TAKE 2 TABLETS (2000IU) BY MOUTH EVERY DAY DX OSTEOPOROSIS 100 tablet 11     Zinc Oxide (DESITIN) 13 % CREA Twice daily to rashy area until resolved       cholecalciferol (VITAMIN D3) 1000 units (25 mcg) capsule Take 2 capsules by mouth daily       HYDROcodone-acetaminophen (NORCO) 5-325 MG tablet TAKE 1 TABLET BY MOUTH EVERY 4 HOURS AS NEEDED FOR PAIN (MAX APAP 4GM/24HRS) 60 tablet 0     linaclotide (LINZESS) 72 MCG capsule Take 72 mcg by mouth every morning (before breakfast)       losartan (COZAAR) 50 MG tablet TAKE TABLET BY MOUTH EVERY MORNING 30 tablet 11     omeprazole (PRILOSEC OTC) 20 MG EC tablet Take 20 mg by  mouth 2 times daily       SM PAIN RELIEVER EX  MG tablet TAKE 1 TABLET BY MOUTH THREE TIMES DAILY FOR BACK PAIN (MAX APAP 3GM/24HRS);TAKE 1-2 TABLETS (500-1000MG) BY MOUTH THREE TIMES DAILY AS NEED 100 tablet 11     REVIEW OF SYSTEMS:  4 point ROS including Respiratory, CV, GI and , other than that noted in the HPI,  is negative    Objective:  /85   Pulse 82   Temp 98.2  F (36.8  C)   Resp 16   Wt 65.9 kg (145 lb 3.2 oz)   SpO2 96%   BMI 23.44 kg/m    Exam:  GENERAL APPEARANCE:  Alert, in mild anxious distress  ENT:  Mouth and posterior oropharynx normal, moist mucous membranes, hearing acuity within normal limits   EYES:  EOM, conjunctivae, lids, pupils and irises normal  RESP:  respiratory effort normal, no respiratory distress, Lung sounds CTA  CV:  Auscultation of heart done , rate and rhythm reg, no murmur, no rub or gallop, no edema   ABDOMEN:  normal bowel sounds, soft, nontender, no hepatosplenomegaly or other masses  M/S:   Gait and station normal, Digits and nails with x10 dystrophic nails on toes  SKIN:  Inspection/Palpation of skin and subcutaneous tissue reveals a 2 cm dark purplish red raised boil near the anus on the R buttocks cheek which is tender and painful.   NEURO: Cranial nerves 2-12 in normal limits and at patient's baseline  PSYCH:  insight and judgement, memory adequate , affect and mood abnormal baseline anxious but somewhat improved overall     Labs:   Recent labs in [facility] reviewed by me today.     ASSESSMENT/PLAN:  Boil  Probable boil/cyst on R buttock near anus, which is raised, painful.  Will schedule antibiotics and if this is not helpful, will likely need I&D of this.   - doxycycline hyclate (VIBRA-TABS) 100 MG tablet; Take 1 tablet (100 mg) by mouth 2 times daily for 10 days    Adjustment disorder with mixed anxiety and depressed mood  Improved anxiety and depression with recent increase of zoloft, tracking better.   - sertraline (ZOLOFT) 25 MG tablet; Take 2  tablets (50 mg) by mouth daily    Rectocele  Reported rectocele not evaluated today, seeing GYN on 7/5/19 for fitting of pessary.     Primary osteoarthritis involving multiple joints  Stable, no new concerns.     Nail dystrophy  Noted thick, long, and difficult to trim toenails that s/he is unable to trim on her own.  After risks were discussed, consent was obtained and patient elected to allow trimming of x10 dystrophic nails.  Patient tolerated the procedure well without complications.      Insomnia due to anxiety and fear  Chronic insomnia, using melatonin nightly and noting some improvement.   - melatonin 3 MG tablet; Take 1 tablet (3 mg) by mouth At Bedtime    Orders written by provider at facility and transcribed by : Te Sargent  1. Mehreen will use warm washcloth several times per day for comfort   2. Doxycycline 100 mg PO BID x10 days Dx: Boil  3. Nursing please reevaluate boil and update NP in 5-7 days      Electronically signed by:  HAILEE Contreras CNP

## 2019-07-06 RX ORDER — SERTRALINE HYDROCHLORIDE 25 MG/1
50 TABLET, FILM COATED ORAL DAILY
Qty: 60 TABLET | Refills: 0
Start: 2019-07-06 | End: 2019-08-27

## 2019-07-06 RX ORDER — LANOLIN ALCOHOL/MO/W.PET/CERES
3 CREAM (GRAM) TOPICAL AT BEDTIME
Start: 2019-07-06 | End: 2020-05-22

## 2019-07-06 RX ORDER — DOXYCYCLINE HYCLATE 100 MG
100 TABLET ORAL 2 TIMES DAILY
Qty: 20 TABLET | Refills: 0
Start: 2019-07-06 | End: 2019-07-31

## 2019-07-23 DIAGNOSIS — M54.41 CHRONIC BILATERAL LOW BACK PAIN WITH BILATERAL SCIATICA: ICD-10-CM

## 2019-07-23 DIAGNOSIS — M54.42 CHRONIC BILATERAL LOW BACK PAIN WITH BILATERAL SCIATICA: ICD-10-CM

## 2019-07-23 DIAGNOSIS — G89.29 CHRONIC BILATERAL LOW BACK PAIN WITH BILATERAL SCIATICA: ICD-10-CM

## 2019-07-23 RX ORDER — HYDROCODONE BITARTRATE AND ACETAMINOPHEN 5; 325 MG/1; MG/1
TABLET ORAL
Qty: 60 TABLET | Refills: 0 | Status: SHIPPED | OUTPATIENT
Start: 2019-07-23 | End: 2019-08-01

## 2019-07-30 ENCOUNTER — ASSISTED LIVING VISIT (OUTPATIENT)
Dept: GERIATRICS | Facility: CLINIC | Age: 79
End: 2019-07-30
Payer: COMMERCIAL

## 2019-07-30 VITALS
RESPIRATION RATE: 18 BRPM | OXYGEN SATURATION: 95 % | SYSTOLIC BLOOD PRESSURE: 165 MMHG | BODY MASS INDEX: 24.6 KG/M2 | DIASTOLIC BLOOD PRESSURE: 89 MMHG | TEMPERATURE: 98.5 F | WEIGHT: 152.4 LBS | HEART RATE: 82 BPM

## 2019-07-30 DIAGNOSIS — I10 ESSENTIAL HYPERTENSION: Primary | ICD-10-CM

## 2019-07-30 DIAGNOSIS — G89.29 CHRONIC BILATERAL LOW BACK PAIN WITH BILATERAL SCIATICA: ICD-10-CM

## 2019-07-30 DIAGNOSIS — M54.42 CHRONIC BILATERAL LOW BACK PAIN WITH BILATERAL SCIATICA: ICD-10-CM

## 2019-07-30 DIAGNOSIS — F43.22 ADJUSTMENT DISORDER WITH ANXIOUS MOOD: ICD-10-CM

## 2019-07-30 DIAGNOSIS — M54.41 CHRONIC BILATERAL LOW BACK PAIN WITH BILATERAL SCIATICA: ICD-10-CM

## 2019-07-30 NOTE — PROGRESS NOTES
[facility]  [place] [facility] Record Number:  [id number]  Place of Service where encounter took place:  [facility] (S) [[id number]]  Chief Complaint   Patient presents with     Hospital F/U       HPI:    Mehreen Camara  is a 79 year old (1940), who is being seen today for an episodic care visit.  HPI information obtained from: facility chart records, facility staff, patient report and [facility] chart review. Today's concern is:     Essential hypertension  Chronic bilateral low back pain with bilateral sciatica  Adjustment disorder with anxious mood   Mehreen reports ongoing bowel constipation/flatulence, ongoing bilateral LBP, ongoing anxiety, ongoing elevated BP.  Nursing reports increased weakness and inability to ambulate to meals without wheelchair assist. Started therapy today.     Past Medical and Surgical History reviewed in Epic today.    MEDICATIONS:  Current Outpatient Medications   Medication Sig Dispense Refill     acetaminophen (TYLENOL) 500 MG tablet Take 1-2 tablets (500-1,000 mg) by mouth 3 times daily as needed for mild pain  0     betamethasone dipropionate (DIPROSONE) 0.05 % external cream Apply topically 2 times daily as needed       bisacodyl (DULCOLAX) 10 MG suppository Place 10 mg rectally every 3 days As needed for constipation       calcium carbonate (TUMS) 500 MG chewable tablet Take 1 tablet (500 mg) by mouth every 2 hours as needed for heartburn 150 tablet 1     carbidopa-levodopa (SINEMET)  MG tablet Take 2 tablets by mouth 3 times daily       diclofenac (VOLTAREN) 1 % topical gel Apply 4 g topically 4 times daily as needed for moderate pain (to back and neck) 100 g 11     gabapentin (NEURONTIN) 100 MG capsule TAKE 2 CAPSULES (200MG) BY MOUTH THREE TIMES DAILY DX NEUROPATHY 180 capsule 11     HYDROcodone-acetaminophen (NORCO) 5-325 MG tablet TAKE 1 TABLET BY MOUTH EVERY 4 HOURS AS NEEDED FOR PAIN (MAX APAP 4GM/24HRS) 60 tablet 0     levothyroxine  (SYNTHROID/LEVOTHROID) 137 MCG tablet TAKE 1 TABLET BY MOUTH EVERY DAY DX HYPOTHYROIDISM 30 tablet 11     losartan (COZAAR) 50 MG tablet TAKE TABLET BY MOUTH EVERY MORNING 30 tablet 11     melatonin 3 MG tablet Take 1 tablet (3 mg) by mouth At Bedtime       mineral oil enema Place 1 enema rectally as needed for constipation 1 enema 0     Multiple Vitamin (TAB-A-KATIA) TABS TAKE 1 TABLET BY MOUTH EVERY DAY FOR SUPPLEMENT, WEIGHT LOSS 100 tablet 11     omeprazole (PRILOSEC) 20 MG DR capsule TAKE 1 CAPSULE BY MOUTH TWICE DAILY DX GASTROESOPHAGEAL REFLUX DISEASE 60 capsule 11     polyethylene glycol (MIRALAX) powder Take 17 g by mouth daily 510 g 1     polyethylene glycol (MIRALAX/GLYCOLAX) packet Take 17 g by mouth 2 times daily as needed for constipation       propylene glycol (SYSTANE BALANCE) 0.6 % SOLN ophthalmic solution Place 1 drop into both eyes 4 times daily as needed for dry eyes       ranitidine (ZANTAC) 150 MG tablet TAKE 1 TABLET BY MOUTH DAILY AT BEDTIME DX GASTROESOPHAGEAL REFLUX DISEASE 30 tablet 11     sertraline (ZOLOFT) 25 MG tablet Take 2 tablets (50 mg) by mouth daily 60 tablet 0     Simethicone (GAS RELIEF) 125 MG CAPS Take 125 mg by mouth 4 times daily as needed (flatulence/gas)       triamcinolone (KENALOG) 0.1 % external cream Apply topically 2 times daily as needed for irritation       VITAMIN D3 1000 units tablet TAKE 2 TABLETS (2000IU) BY MOUTH EVERY DAY DX OSTEOPOROSIS 100 tablet 11     Zinc Oxide (DESITIN) 13 % CREA Twice daily to rashy area until resolved       SM PAIN RELIEVER EX  MG tablet TAKE 1 TABLET BY MOUTH THREE TIMES DAILY FOR BACK PAIN (MAX APAP 3GM/24HRS);TAKE 1-2 TABLETS (500-1000MG) BY MOUTH THREE TIMES DAILY AS NEED 100 tablet 11     REVIEW OF SYSTEMS:  4 point ROS including Respiratory, CV, GI and , other than that noted in the HPI,  is negative    Objective:  BP (!) 165/89   Pulse 82   Temp 98.5  F (36.9  C)   Resp 18   Wt 69.1 kg (152 lb 6.4 oz)   SpO2 95%    BMI 24.60 kg/m    Exam:  GENERAL APPEARANCE:  Alert, in no acute distress , anxious appearing  HEAD:  Normal, normocephalic, atraumatic  EYE EXAM: normal external eye, conjunctiva, lids, JONO  CHEST/RESP:  respiratory effort normal, lung sounds CTA , no respiratory distress  CV:  Rate reg, rhythm reg, no  murmur, 2+ peripheral edema bilateral LE to mid-calf  M/S:   extremities normal, gait normal-without device in apartment but feels too weak to walk to meals and requiring wheelchair escort  SKIN:  Perineum slightly reddened and irritated without smell of yeast, no open areas.   NEUROLOGIC EXAM: Normal gross motor movement, tone and coordination. No tremor. Cranial nerves 2-12 are normal tested and grossly at patient's baseline  PSYCH:  Alert and oriented to person-place-time , affect anxious      Labs:   Recent labs in [facility] reviewed by me today.     ASSESSMENT/PLAN:  Essential hypertension  Patient with ongoing HTN, and now LE edema.  On losartan. Will start hydrochlorothiazide for better BP control and edema control    Chronic bilateral low back pain with bilateral sciatica  Stable on current regimen.  She would like to stop taking the norco, but does find relief.  Feels her pain at baseline is 7/10, and with norco will drop to 6/10.  Stable.     Adjustment disorder with anxious mood  Stable.  Seems to be less anxious, less tangential thoughts with addition of sertraline 50 mg daily.  May need to increase this.          Orders written by provider at facility and transcribed by : Te Sargent  1. Hydrochlorothiazide 25 mg PO Daily Dx: HT, LE Edema        Electronically signed by:  HAILEE Contreras CNP

## 2019-07-31 RX ORDER — HYDROCHLOROTHIAZIDE 25 MG/1
25 TABLET ORAL DAILY
Start: 2019-07-31 | End: 2020-05-22

## 2019-08-01 DIAGNOSIS — M54.42 CHRONIC BILATERAL LOW BACK PAIN WITH BILATERAL SCIATICA: ICD-10-CM

## 2019-08-01 DIAGNOSIS — M54.41 CHRONIC BILATERAL LOW BACK PAIN WITH BILATERAL SCIATICA: ICD-10-CM

## 2019-08-01 DIAGNOSIS — G89.29 CHRONIC BILATERAL LOW BACK PAIN WITH BILATERAL SCIATICA: ICD-10-CM

## 2019-08-01 RX ORDER — HYDROCODONE BITARTRATE AND ACETAMINOPHEN 5; 325 MG/1; MG/1
TABLET ORAL
Qty: 60 TABLET | Refills: 0 | Status: SHIPPED | OUTPATIENT
Start: 2019-08-01 | End: 2019-08-23

## 2019-08-06 ENCOUNTER — COMMUNICATION - HEALTHEAST (OUTPATIENT)
Dept: CARDIOLOGY | Facility: CLINIC | Age: 79
End: 2019-08-06

## 2019-08-23 ENCOUNTER — APPOINTMENT (OUTPATIENT)
Dept: CT IMAGING | Facility: CLINIC | Age: 79
End: 2019-08-23
Attending: EMERGENCY MEDICINE
Payer: COMMERCIAL

## 2019-08-23 ENCOUNTER — HOSPITAL ENCOUNTER (EMERGENCY)
Facility: CLINIC | Age: 79
Discharge: HOME | End: 2019-08-24
Attending: EMERGENCY MEDICINE | Admitting: EMERGENCY MEDICINE
Payer: COMMERCIAL

## 2019-08-23 DIAGNOSIS — K59.03 DRUG-INDUCED CONSTIPATION: ICD-10-CM

## 2019-08-23 DIAGNOSIS — R10.11 ABDOMINAL PAIN, RIGHT UPPER QUADRANT: ICD-10-CM

## 2019-08-23 DIAGNOSIS — I10 ESSENTIAL HYPERTENSION: ICD-10-CM

## 2019-08-23 DIAGNOSIS — M54.41 CHRONIC BILATERAL LOW BACK PAIN WITH BILATERAL SCIATICA: ICD-10-CM

## 2019-08-23 DIAGNOSIS — M54.42 CHRONIC BILATERAL LOW BACK PAIN WITH BILATERAL SCIATICA: ICD-10-CM

## 2019-08-23 DIAGNOSIS — G89.29 CHRONIC BILATERAL LOW BACK PAIN WITH BILATERAL SCIATICA: ICD-10-CM

## 2019-08-23 LAB
ALBUMIN SERPL-MCNC: 3.5 G/DL (ref 3.4–5)
ALBUMIN UR-MCNC: NEGATIVE MG/DL
ALP SERPL-CCNC: 65 U/L (ref 40–150)
ALT SERPL W P-5'-P-CCNC: 13 U/L (ref 0–50)
ANION GAP SERPL CALCULATED.3IONS-SCNC: 4 MMOL/L (ref 3–14)
APPEARANCE UR: CLEAR
AST SERPL W P-5'-P-CCNC: 19 U/L (ref 0–45)
BASOPHILS # BLD AUTO: 0.1 10E9/L (ref 0–0.2)
BASOPHILS NFR BLD AUTO: 0.5 %
BILIRUB SERPL-MCNC: 0.4 MG/DL (ref 0.2–1.3)
BILIRUB UR QL STRIP: NEGATIVE
BUN SERPL-MCNC: 18 MG/DL (ref 7–30)
CALCIUM SERPL-MCNC: 8.6 MG/DL (ref 8.5–10.1)
CHLORIDE SERPL-SCNC: 101 MMOL/L (ref 94–109)
CO2 SERPL-SCNC: 34 MMOL/L (ref 20–32)
COLOR UR AUTO: ABNORMAL
CREAT SERPL-MCNC: 0.53 MG/DL (ref 0.52–1.04)
DIFFERENTIAL METHOD BLD: NORMAL
EOSINOPHIL # BLD AUTO: 0.1 10E9/L (ref 0–0.7)
EOSINOPHIL NFR BLD AUTO: 0.7 %
ERYTHROCYTE [DISTWIDTH] IN BLOOD BY AUTOMATED COUNT: 11.5 % (ref 10–15)
GFR SERPL CREATININE-BSD FRML MDRD: 90 ML/MIN/{1.73_M2}
GLUCOSE SERPL-MCNC: 90 MG/DL (ref 70–99)
GLUCOSE UR STRIP-MCNC: NEGATIVE MG/DL
HCT VFR BLD AUTO: 39.4 % (ref 35–47)
HGB BLD-MCNC: 12.7 G/DL (ref 11.7–15.7)
HGB UR QL STRIP: ABNORMAL
IMM GRANULOCYTES # BLD: 0 10E9/L (ref 0–0.4)
IMM GRANULOCYTES NFR BLD: 0.4 %
KETONES UR STRIP-MCNC: NEGATIVE MG/DL
LACTATE BLD-SCNC: 0.7 MMOL/L (ref 0.7–2)
LEUKOCYTE ESTERASE UR QL STRIP: NEGATIVE
LIPASE SERPL-CCNC: 146 U/L (ref 73–393)
LYMPHOCYTES # BLD AUTO: 1.6 10E9/L (ref 0.8–5.3)
LYMPHOCYTES NFR BLD AUTO: 15.2 %
MCH RBC QN AUTO: 32.2 PG (ref 26.5–33)
MCHC RBC AUTO-ENTMCNC: 32.2 G/DL (ref 31.5–36.5)
MCV RBC AUTO: 100 FL (ref 78–100)
MONOCYTES # BLD AUTO: 0.6 10E9/L (ref 0–1.3)
MONOCYTES NFR BLD AUTO: 5.9 %
NEUTROPHILS # BLD AUTO: 8.2 10E9/L (ref 1.6–8.3)
NEUTROPHILS NFR BLD AUTO: 77.3 %
NITRATE UR QL: NEGATIVE
NRBC # BLD AUTO: 0 10*3/UL
NRBC BLD AUTO-RTO: 0 /100
PH UR STRIP: 8 PH (ref 5–7)
PLATELET # BLD AUTO: 181 10E9/L (ref 150–450)
POTASSIUM SERPL-SCNC: 3.7 MMOL/L (ref 3.4–5.3)
PROT SERPL-MCNC: 7.1 G/DL (ref 6.8–8.8)
RBC # BLD AUTO: 3.94 10E12/L (ref 3.8–5.2)
RBC #/AREA URNS AUTO: 1 /HPF (ref 0–2)
SODIUM SERPL-SCNC: 139 MMOL/L (ref 133–144)
SOURCE: ABNORMAL
SP GR UR STRIP: 1.01 (ref 1–1.03)
SQUAMOUS #/AREA URNS AUTO: <1 /HPF (ref 0–1)
UROBILINOGEN UR STRIP-MCNC: 0 MG/DL (ref 0–2)
WBC # BLD AUTO: 10.6 10E9/L (ref 4–11)
WBC #/AREA URNS AUTO: 1 /HPF (ref 0–5)

## 2019-08-23 PROCEDURE — 85025 COMPLETE CBC W/AUTO DIFF WBC: CPT | Performed by: EMERGENCY MEDICINE

## 2019-08-23 PROCEDURE — 74177 CT ABD & PELVIS W/CONTRAST: CPT

## 2019-08-23 PROCEDURE — 99284 EMERGENCY DEPT VISIT MOD MDM: CPT | Mod: Z6 | Performed by: EMERGENCY MEDICINE

## 2019-08-23 PROCEDURE — 25000128 H RX IP 250 OP 636: Performed by: EMERGENCY MEDICINE

## 2019-08-23 PROCEDURE — 80053 COMPREHEN METABOLIC PANEL: CPT | Performed by: EMERGENCY MEDICINE

## 2019-08-23 PROCEDURE — 83690 ASSAY OF LIPASE: CPT | Performed by: EMERGENCY MEDICINE

## 2019-08-23 PROCEDURE — 81001 URINALYSIS AUTO W/SCOPE: CPT | Performed by: EMERGENCY MEDICINE

## 2019-08-23 PROCEDURE — 99285 EMERGENCY DEPT VISIT HI MDM: CPT | Mod: 25 | Performed by: EMERGENCY MEDICINE

## 2019-08-23 PROCEDURE — 36415 COLL VENOUS BLD VENIPUNCTURE: CPT | Performed by: EMERGENCY MEDICINE

## 2019-08-23 PROCEDURE — 83605 ASSAY OF LACTIC ACID: CPT | Performed by: EMERGENCY MEDICINE

## 2019-08-23 PROCEDURE — 25000125 ZZHC RX 250: Performed by: EMERGENCY MEDICINE

## 2019-08-23 RX ORDER — ASPIRIN 81 MG
100 TABLET, DELAYED RELEASE (ENTERIC COATED) ORAL DAILY
Qty: 60 TABLET | Refills: 1 | COMMUNITY
Start: 2019-08-23 | End: 2019-08-24

## 2019-08-23 RX ORDER — IOPAMIDOL 755 MG/ML
74 INJECTION, SOLUTION INTRAVASCULAR ONCE
Status: COMPLETED | OUTPATIENT
Start: 2019-08-23 | End: 2019-08-23

## 2019-08-23 RX ORDER — HYDROCODONE BITARTRATE AND ACETAMINOPHEN 5; 325 MG/1; MG/1
1 TABLET ORAL EVERY 4 HOURS PRN
Qty: 60 TABLET | Refills: 0 | Status: SHIPPED | OUTPATIENT
Start: 2019-08-23 | End: 2019-09-13

## 2019-08-23 RX ORDER — MAGNESIUM CARB/ALUMINUM HYDROX 105-160MG
296 TABLET,CHEWABLE ORAL ONCE
Status: DISCONTINUED | OUTPATIENT
Start: 2019-08-23 | End: 2019-08-24 | Stop reason: HOSPADM

## 2019-08-23 RX ADMIN — SODIUM CHLORIDE 58 ML: 9 INJECTION, SOLUTION INTRAVENOUS at 23:17

## 2019-08-23 RX ADMIN — IOPAMIDOL 74 ML: 755 INJECTION, SOLUTION INTRAVENOUS at 23:17

## 2019-08-23 ASSESSMENT — ENCOUNTER SYMPTOMS
LIGHT-HEADEDNESS: 0
BACK PAIN: 0
FREQUENCY: 0
COUGH: 0
NAUSEA: 1
DIARRHEA: 0
FLANK PAIN: 0
DYSURIA: 0
VOMITING: 0
FATIGUE: 0
HEADACHES: 0
CONSTIPATION: 1
CHILLS: 0
CONFUSION: 0
ABDOMINAL PAIN: 1
CHEST TIGHTNESS: 0
SHORTNESS OF BREATH: 0
ABDOMINAL DISTENTION: 1
FEVER: 0
APPETITE CHANGE: 0

## 2019-08-23 NOTE — ED AVS SNAPSHOT
[facility]  [address] [address]  Phone:  [phone]  Fax:  [phone]                                    Mehreen Camara   MRN: [id number]    Department:  [facility]   Date of Visit:  8/23/2019           After Visit Summary Signature Page    I have received my discharge instructions, and my questions have been answered. I have discussed any challenges I see with this plan with the nurse or doctor.    ..........................................................................................................................................  Patient/Patient Representative Signature      ..........................................................................................................................................  Patient Representative Print Name and Relationship to Patient    ..................................................               ................................................  Date                                   Time    ..........................................................................................................................................  Reviewed by Signature/Title    ...................................................              ..............................................  Date                                               Time          22EPIC Rev 08/18

## 2019-08-24 VITALS
TEMPERATURE: 97.8 F | WEIGHT: 150 LBS | SYSTOLIC BLOOD PRESSURE: 181 MMHG | DIASTOLIC BLOOD PRESSURE: 99 MMHG | OXYGEN SATURATION: 98 % | HEART RATE: 62 BPM | BODY MASS INDEX: 24.21 KG/M2

## 2019-08-24 RX ORDER — ASPIRIN 81 MG
100 TABLET, DELAYED RELEASE (ENTERIC COATED) ORAL DAILY
Qty: 14 TABLET | Refills: 0 | Status: SHIPPED | OUTPATIENT
Start: 2019-08-24 | End: 2019-09-10

## 2019-08-24 NOTE — ED PROVIDER NOTES
History     Chief Complaint   Patient presents with     Abdominal Pain     RUQ, started yesterday, intermittent nausea     HPI  Mehreen Camara is a 79 year old female with a history of hypothyroidism, small bowel obstruction, depression, chronic pain on chronic opiates, as well as a history of constipation presented for evaluation of right-sided abdominal pain.  Patient reports several days of progressive worsening right-sided abdominal pain.  She reports crampy pain initially which is now burning in nature.  Pain remains on the right side of her upper abdomen radiating into her chest.  Denies any actual chest pain or difficulty breathing.  Denies diaphoresis.  Reports occasional mild nausea but has been eating and drinking normally with a normal appetite.  Does admit to regular constipation.  Last bowel movement was about 24 hours ago where she reports she had a small amount of stool passed which was hard.  Denies fever chills.  Denies injury to the area.  Denies rashes.  Reports she has had chronic abdominal pain issues but her current pain is more burning in nature which is different from the past.      ==================================================================    CHART REVIEW:      Study Result     CT ABDOMEN PELVIS WITH CONTRAST 3/2/2019 4:52 PM     TECHNIQUE: Images from diaphragm to pubic symphysis. 72 mL Isovue 370.     Radiation dose for this scan was reduced using automated exposure  control, adjustment of the mA and/or kV according to patient size, or  iterative reconstruction technique.     HISTORY: Diffuse abdominal pain, worsened over 2 weeks, nausea, not  responding to an enema.     COMPARISON: None.     FINDINGS:   Abdomen and Pelvis: Lung bases clear. Cholecystectomy clips. No  worrisome lesions identified in the liver, spleen, pancreas or  kidneys.     There is an indeterminate left adrenal mass 2.5 cm AP by 2.2 cm  transverse by 3 cm craniocaudal dimension. Right adrenal gland  within  normal limits.     There is [id number] graft and postop right hip arthroplasty with  streak artifact. No periaortic or pelvic adenopathy.     Large stool in the colon. Uncomplicated appearing mild sigmoid  diverticulosis. Nonvisualized appendix. Small bowel and stomach  unremarkable as seen with CT technique. Lumbar scoliosis and  degenerative change lower lumbar spine.                                                                      IMPRESSION :  1. Large stool throughout the colon.  2. Indeterminate 2.5 x 2.2 x 3 cm left adrenal mass.      GIO FERNANDEZ MD         END CHART REVIEW  ==================================================================      Allergies:  Allergies   Allergen Reactions     Penicillins Anaphylaxis, Rash and Shortness Of Breath     Aspirin Nausea     Atenolol Cough     Atorvastatin Muscle Pain (Myalgia)     Bupropion Nausea     Clindamycin Other (See Comments)     Constipation      Codeine Nausea     Ibuprofen Nausea     Stomach upset     Lovastatin Muscle Pain (Myalgia)     Cephalexin Rash     Neck reddness       Problem List:    Patient Active Problem List    Diagnosis Date Noted     Post-op pain 12/12/2018     Priority: Medium     S/P laparoscopic cholecystectomy 12/12/2018     Priority: Medium     Multiple system atrophy (H) 11/14/2018     Priority: Medium     Movement disorder 09/19/2018     Priority: Medium     Rheumatic aortic stenosis 09/19/2018     Priority: Medium     Disorder of bursae and tendons in shoulder region 09/18/2018     Priority: Medium     Overview:   Created by Conversion    Replacement Utility updated for latest IMO load       Adjustment disorder with anxious mood 09/17/2018     Priority: Medium     Adjustment disorder with mixed anxiety and depressed mood 09/17/2018     Priority: Medium     Aneurysm of abdominal vessel (H) 09/17/2018     Priority: Medium     Overview:   Created by Conversion  [facility] Annotation: Jun 8 2011  8:07AM - Angel  Danni: 4.4 on 11/2013.    Needs 6-12 month u/s or CT.    Replacement Utility updated for latest IMO load       Ahrris's esophagus 09/17/2018     Priority: Medium     Overview:   Created by Conversion  [facility] Annotation: Feb  3 2012  8:32AM Cameron Eisenberg: Needs EGD 2/2017       Bradycardia 09/17/2018     Priority: Medium     Cataract 09/17/2018     Priority: Medium     Overview:   Created by Conversion       Major depressive disorder, recurrent episode (H) 09/17/2018     Priority: Medium     Overview:   Created by Conversion    Replacement Utility updated for latest IMO load       Constipation 09/17/2018     Priority: Medium     Dizziness 09/17/2018     Priority: Medium     Overview:   Created by Conversion       Dyslipidemia 09/17/2018     Priority: Medium     Overview:   Created by Conversion       Esophageal reflux 09/17/2018     Priority: Medium     Overview:   Created by Conversion       Hypertension 09/17/2018     Priority: Medium     Overview:   Created by Conversion    Replacement Utility updated for latest IMO load       Hypothyroidism 09/17/2018     Priority: Medium     Overview:   Created by Conversion    Replacement Utility updated for latest IMO load       Insomnia due to anxiety and fear 09/17/2018     Priority: Medium     Lower back pain 09/17/2018     Priority: Medium     Osteoarthrosis 09/17/2018     Priority: Medium     Overview:   Created by Conversion    Replacement Utility updated for latest IMO load       Disorder of bone and cartilage 09/17/2018     Priority: Medium     Overview:   Created by Conversion  [facility] Annotation: Dec 13 2012  7:41AM Roberta Goodwin: vit D/Ca, f/u   Dexa needed 1/2014.    Replacement Utility updated for latest IMO load       Lower extremity weakness 07/04/2018     Priority: Medium     Orthostatic hypotension dysautonomic syndrome (H) 09/22/2017     Priority: Medium     Primary insomnia 07/04/2017     Priority: Medium     Urinary incontinence in female  2017     Priority: Medium     Weakness 2017     Priority: Medium     S/P AAA repair using bifurcation graft 2015     Priority: Medium     Adrenal adenoma 2015     Priority: Medium     Overview:   Current hormonal evaluation through endocrinology          Past Medical History:    Past Medical History:   Diagnosis Date     Adrenal adenoma      Arthritis      Depressive disorder      Hypertension      Rheumatic fever      Small bowel obstruction (H)      Thyroid disease        Past Surgical History:    Past Surgical History:   Procedure Laterality Date     AAA REPAIR      wt bifurcation graft     CARPAL TUNNEL RELEASE RT/LT Bilateral 2013     HYSTERECTOMY  2013     JOINT REPLACEMENT Right 2003     LAPAROSCOPIC CHOLECYSTECTOMY N/A 2018    Procedure: LAPAROSCOPIC CHOLECYSTECTOMY;  Surgeon: Amadeo Sebastian MD;  Location: [facility] OR     ORTHOPEDIC SURGERY       SPINE SURGERY      cervical spine fusion     THYROIDECTOMY         Family History:    Family History   Problem Relation Age of Onset     Hypertension Mother      Coronary Artery Disease Mother      Coronary Artery Disease Father      Other Cancer Brother      Parkinsonism Other        Social History:  Marital Status:   [5]  Social History     Tobacco Use     Smoking status: Former Smoker     Packs/day: 0.50     Types: Cigarettes     Last attempt to quit: 1994     Years since quittin.7     Smokeless tobacco: Never Used   Substance Use Topics     Alcohol use: No     Drug use: No        Medications:      docusate sodium (COLACE) 100 MG tablet   magnesium citrate solution   acetaminophen (TYLENOL) 500 MG tablet   betamethasone dipropionate (DIPROSONE) 0.05 % external cream   bisacodyl (DULCOLAX) 10 MG suppository   calcium carbonate (TUMS) 500 MG chewable tablet   carbidopa-levodopa (SINEMET)  MG tablet   diclofenac (VOLTAREN) 1 % topical gel   gabapentin (NEURONTIN) 100 MG capsule   hydrochlorothiazide  (HYDRODIURIL) 25 MG tablet   HYDROcodone-acetaminophen (NORCO) 5-325 MG tablet   levothyroxine (SYNTHROID/LEVOTHROID) 137 MCG tablet   losartan (COZAAR) 50 MG tablet   melatonin 3 MG tablet   mineral oil enema   Multiple Vitamin (TAB-A-KATIA) TABS   omeprazole (PRILOSEC) 20 MG DR capsule   polyethylene glycol (MIRALAX) powder   polyethylene glycol (MIRALAX/GLYCOLAX) packet   propylene glycol (SYSTANE BALANCE) 0.6 % SOLN ophthalmic solution   ranitidine (ZANTAC) 150 MG tablet   sertraline (ZOLOFT) 25 MG tablet   Simethicone (GAS RELIEF) 125 MG CAPS   SM PAIN RELIEVER EX  MG tablet   triamcinolone (KENALOG) 0.1 % external cream   VITAMIN D3 1000 units tablet   Zinc Oxide (DESITIN) 13 % CREA         Review of Systems   Constitutional: Negative for appetite change, chills, fatigue and fever.   HENT: Negative for congestion.    Respiratory: Negative for cough, chest tightness and shortness of breath.    Cardiovascular: Negative for chest pain.   Gastrointestinal: Positive for abdominal distention, abdominal pain, constipation and nausea. Negative for diarrhea and vomiting.   Genitourinary: Negative for dysuria, flank pain, frequency and urgency.   Musculoskeletal: Negative for back pain.   Neurological: Negative for light-headedness and headaches.   Psychiatric/Behavioral: Negative for confusion.   All other systems reviewed and are negative.      Physical Exam   BP: (!) 185/102  Pulse: 71  Heart Rate: 90  Temp: 97.8  F (36.6  C)  Weight: 68 kg (150 lb)  SpO2: 97 %      Physical Exam   Constitutional: She is oriented to person, place, and time. She appears well-developed and well-nourished.  Non-toxic appearance. She does not appear ill.   HENT:   Head: Normocephalic.   Eyes: Conjunctivae are normal.   Neck: Normal range of motion.   Cardiovascular: Normal rate.   Pulmonary/Chest: Effort normal and breath sounds normal.   Abdominal: Soft. Bowel sounds are normal. There is tenderness (diffuse). There is no rebound.    distended   Musculoskeletal: Normal range of motion.   Neurological: She is alert and oriented to person, place, and time.   Skin: Skin is warm and dry. Capillary refill takes less than 2 seconds.   Psychiatric: She has a normal mood and affect.   Nursing note and vitals reviewed.      ED Course        Procedures            Results for orders placed or performed during the hospital encounter of 08/23/19 (from the past 24 hour(s))   UA reflex to Microscopic   Result Value Ref Range    Color Urine Straw     Appearance Urine Clear     Glucose Urine Negative NEG^Negative mg/dL    Bilirubin Urine Negative NEG^Negative    Ketones Urine Negative NEG^Negative mg/dL    Specific Gravity Urine 1.008 1.003 - 1.035    Blood Urine Small (A) NEG^Negative    pH Urine 8.0 (H) 5.0 - 7.0 pH    Protein Albumin Urine Negative NEG^Negative mg/dL    Urobilinogen mg/dL 0.0 0.0 - 2.0 mg/dL    Nitrite Urine Negative NEG^Negative    Leukocyte Esterase Urine Negative NEG^Negative    Source Midstream Urine     RBC Urine 1 0 - 2 /HPF    WBC Urine 1 0 - 5 /HPF    Squamous Epithelial /HPF Urine <1 0 - 1 /HPF   CBC with platelets differential   Result Value Ref Range    WBC 10.6 4.0 - 11.0 10e9/L    RBC Count 3.94 3.8 - 5.2 10e12/L    Hemoglobin 12.7 11.7 - 15.7 g/dL    Hematocrit 39.4 35.0 - 47.0 %     78 - 100 fl    MCH 32.2 26.5 - 33.0 pg    MCHC 32.2 31.5 - 36.5 g/dL    RDW 11.5 10.0 - 15.0 %    Platelet Count 181 150 - 450 10e9/L    Diff Method Automated Method     % Neutrophils 77.3 %    % Lymphocytes 15.2 %    % Monocytes 5.9 %    % Eosinophils 0.7 %    % Basophils 0.5 %    % Immature Granulocytes 0.4 %    Nucleated RBCs 0 0 /100    Absolute Neutrophil 8.2 1.6 - 8.3 10e9/L    Absolute Lymphocytes 1.6 0.8 - 5.3 10e9/L    Absolute Monocytes 0.6 0.0 - 1.3 10e9/L    Absolute Eosinophils 0.1 0.0 - 0.7 10e9/L    Absolute Basophils 0.1 0.0 - 0.2 10e9/L    Abs Immature Granulocytes 0.0 0 - 0.4 10e9/L    Absolute Nucleated RBC 0.0     Comprehensive metabolic panel   Result Value Ref Range    Sodium 139 133 - 144 mmol/L    Potassium 3.7 3.4 - 5.3 mmol/L    Chloride 101 94 - 109 mmol/L    Carbon Dioxide 34 (H) 20 - 32 mmol/L    Anion Gap 4 3 - 14 mmol/L    Glucose 90 70 - 99 mg/dL    Urea Nitrogen 18 7 - 30 mg/dL    Creatinine 0.53 0.52 - 1.04 mg/dL    GFR Estimate 90 >60 mL/min/[1.73_m2]    GFR Estimate If Black >90 >60 mL/min/[1.73_m2]    Calcium 8.6 8.5 - 10.1 mg/dL    Bilirubin Total 0.4 0.2 - 1.3 mg/dL    Albumin 3.5 3.4 - 5.0 g/dL    Protein Total 7.1 6.8 - 8.8 g/dL    Alkaline Phosphatase 65 40 - 150 U/L    ALT 13 0 - 50 U/L    AST 19 0 - 45 U/L   Lactic acid whole blood   Result Value Ref Range    Lactic Acid 0.7 0.7 - 2.0 mmol/L   Lipase   Result Value Ref Range    Lipase 146 73 - 393 U/L   CT Abdomen Pelvis w Contrast    Narrative    CT ABDOMEN PELVIS W CONTRAST  8/23/2019 11:28 PM     HISTORY: Abdominal pain, unspecified. Right upper quadrant pain and  tenderness.    TECHNIQUE: CT abdomen and pelvis with 74 mL Isovue-370 IV. Radiation  dose for this scan was reduced using automated exposure control,  adjustment of the mA and/or kV according to patient size, or iterative  reconstruction technique.    COMPARISON: 3/2/2019.    FINDINGS:  Abdomen: Mild scarring at the lung bases. The liver, spleen, pancreas  and right adrenal gland are normal in appearance. There is again a  left adrenal gland nodule measuring approximately 2.1 cm. The  gallbladder is absent. The right kidney is ptotic. The kidneys  otherwise appear normal. No hydronephrosis. There is no abdominal or  pelvic lymph node enlargement. There is an aortic stent graft in  place.    Pelvis: A right hip arthroplasty causes streak artifact through the  pelvis. There are colonic diverticula without acute diverticulitis. No  bowel obstruction or inflammation. No free intraperitoneal gas or  fluid. There is degenerative disease and scoliosis in the spine.      Impression     IMPRESSION:  1. No acute abnormality. No bowel obstruction or inflammation.  2. Colonic diverticula without acute diverticulitis.  3. Stable left adrenal gland nodule.       Medications   iohexol (OMNIPAQUE) solution 25 mL ( Oral Given 8/23/19 2211)   magnesium citrate solution 296 mL (has no administration in time range)   iopamidol (ISOVUE-370) solution 74 mL (74 mLs Intravenous Given 8/23/19 2317)   sodium chloride 0.9 % bag 500mL for CT scan flush use (58 mLs Intravenous Given 8/23/19 2317)       Patient Vitals for the past 24 hrs:   BP Temp Temp src Pulse Heart Rate SpO2 Weight   08/24/19 0013 (!) 181/99 -- -- -- -- -- --   08/23/19 2230 (!) 205/97 -- -- 62 -- 98 % --   08/23/19 2215 (!) 209/107 -- -- 71 -- -- --   08/23/19 1838 (!) 185/102 97.8  F (36.6  C) Oral -- 90 97 % 68 kg (150 lb)         11:58 PM: Patient reassessed.  Feeling somewhat better.  Feels she may need to have a bowel movement.  Discussed CT results suggesting constipation as the probable main cause of her symptoms that she does have moderate colonic distention of the ascending colon with diffuse intestinal gas as well.  Labs and CT without dangerous cause of her symptoms.      Assessments & Plan (with Medical Decision Making)  79-year-old female with history of hypothyroid SBO, chronic pain on opiates, as well as constipation presented for evaluation of right-sided abdominal pain.  She reports intermittent episodes of nausea but none currently.  On exam she has some mild right upper tenderness.  Overall does not look toxic but given her age and previous abdominal surgical history, recommended labs and dangerous cause of her pain such as obstruction.  CT imaging showed evidence of colonic dilation of the ascending colon with probable constipation but no acute dangerous pathology.  Discussed treatment options including magnesium citrate.  Also discussed consulting with her primary care provider to consider weaning further off of her opiates as  these are likely contributing to her chronic constipation.     I have reviewed the nursing notes.    I have reviewed the findings, diagnosis, plan and need for follow up with the patient.       New Prescriptions    DOCUSATE SODIUM (COLACE) 100 MG TABLET    Take 1 tablet (100 mg) by mouth daily    MAGNESIUM CITRATE SOLUTION    Take 296 mLs by mouth once as needed for constipation       Final diagnoses:   Abdominal pain, right upper quadrant   Essential hypertension   Drug-induced constipation       8/23/2019   [facility] EMERGENCY DEPARTMENT     Rivera, Meliton Ellison MD  08/24/19 0014       Rivera, Meliton Ellison MD  08/24/19 0015

## 2019-08-27 ENCOUNTER — ASSISTED LIVING VISIT (OUTPATIENT)
Dept: GERIATRICS | Facility: CLINIC | Age: 79
End: 2019-08-27
Payer: COMMERCIAL

## 2019-08-27 ENCOUNTER — COMMUNICATION - HEALTHEAST (OUTPATIENT)
Dept: ADMINISTRATIVE | Facility: CLINIC | Age: 79
End: 2019-08-27

## 2019-08-27 VITALS
OXYGEN SATURATION: 92 % | HEART RATE: 78 BPM | DIASTOLIC BLOOD PRESSURE: 84 MMHG | SYSTOLIC BLOOD PRESSURE: 175 MMHG | TEMPERATURE: 97.8 F | WEIGHT: 153.2 LBS | BODY MASS INDEX: 24.73 KG/M2 | RESPIRATION RATE: 18 BRPM

## 2019-08-27 DIAGNOSIS — M25.572 PAIN IN JOINT, ANKLE AND FOOT, LEFT: ICD-10-CM

## 2019-08-27 DIAGNOSIS — M54.42 CHRONIC BILATERAL LOW BACK PAIN WITH BILATERAL SCIATICA: ICD-10-CM

## 2019-08-27 DIAGNOSIS — G23.8 MULTIPLE SYSTEM ATROPHY (H): ICD-10-CM

## 2019-08-27 DIAGNOSIS — G90.3 MULTIPLE SYSTEM ATROPHY (H): ICD-10-CM

## 2019-08-27 DIAGNOSIS — R14.3 FLATULENCE, ERUCTATION, AND GAS PAIN: ICD-10-CM

## 2019-08-27 DIAGNOSIS — F43.23 ADJUSTMENT DISORDER WITH MIXED ANXIETY AND DEPRESSED MOOD: ICD-10-CM

## 2019-08-27 DIAGNOSIS — I10 ESSENTIAL HYPERTENSION: ICD-10-CM

## 2019-08-27 DIAGNOSIS — I95.1 ORTHOSTATIC HYPOTENSION DYSAUTONOMIC SYNDROME: ICD-10-CM

## 2019-08-27 DIAGNOSIS — R14.1 FLATULENCE, ERUCTATION, AND GAS PAIN: ICD-10-CM

## 2019-08-27 DIAGNOSIS — G89.29 CHRONIC BILATERAL LOW BACK PAIN WITH BILATERAL SCIATICA: ICD-10-CM

## 2019-08-27 DIAGNOSIS — M54.41 CHRONIC BILATERAL LOW BACK PAIN WITH BILATERAL SCIATICA: ICD-10-CM

## 2019-08-27 DIAGNOSIS — K59.04 CHRONIC IDIOPATHIC CONSTIPATION: Primary | ICD-10-CM

## 2019-08-27 DIAGNOSIS — R14.2 FLATULENCE, ERUCTATION, AND GAS PAIN: ICD-10-CM

## 2019-08-27 NOTE — PROGRESS NOTES
[facility]  [place] Medical Record Number:  [id number]  Place of Service where encounter took place:  [facility] ([facility]) [[id number]]  Chief Complaint   Patient presents with     RECHECK       HPI:    Mehreen Camara  is a 79 year old (1940), who is being seen today for an episodic care visit.  HPI information obtained from: facility chart records, facility staff, patient report, [facility] chart review and family/first contact , dtr [facility],  report. Today's concern is:     Chronic idiopathic constipation  Flatulence, eructation, and gas pain  Adjustment disorder with mixed anxiety and depressed mood  Chronic bilateral low back pain with bilateral sciatica  Essential hypertension  Orthostatic hypotension dysautonomic syndrome (H)  Multiple system atrophy (H)  Pain in joint, ankle and foot, left   Mehreen reports she was evaluated in the ED over the last weekend due to ongoing worse pain in R abdomen.  CT at ER noted constipation and she was given mag citrate.  She reports she did not have a large BM, but was feeling better after.  Now on colace x 14 days.  She reports this has not produced a lot of stool, still with abdominal pain. She reports the ED MD told her to stop the Norco (likely contributing to the constipation), but she cannot because she always hurts so much in the abdomen, leg, shoulders, back due to scoliosis.  She is fretful and anxious, worried.  Family reports they would like her to stop the Norco, but she is so anxious/miserable when she does not take it.  They report she was seen by [facility], had MRI of L ankle, and noted to have some muscle damage there - unknown etiology. She is wearing a CAM walker boot, feels some improvement.  Nursing reports no other new concerns.  She is still getting therapy and is sometimes able to walk to meals without wheelchair transport, but often still requires it.       Past Medical and Surgical History reviewed in Epic  today.    MEDICATIONS:  Current Outpatient Medications   Medication Sig Dispense Refill     acetaminophen (TYLENOL) 500 MG tablet Take 1 tablet (500 mg) by mouth 3 times daily  0     betamethasone dipropionate (DIPROSONE) 0.05 % external cream Apply topically 2 times daily as needed       bisacodyl (DULCOLAX) 10 MG suppository Place 10 mg rectally every 3 days As needed for constipation       calcium carbonate (TUMS) 500 MG chewable tablet Take 1 tablet (500 mg) by mouth every 2 hours as needed for heartburn 150 tablet 1     carbidopa-levodopa (SINEMET)  MG tablet Take 2 tablets by mouth 3 times daily       diclofenac (VOLTAREN) 1 % topical gel Apply 4 g topically 4 times daily as needed for moderate pain (to back and neck) 100 g 11     docusate sodium (COLACE) 100 MG tablet Take 1 tablet (100 mg) by mouth daily for 14 days 14 tablet 0     gabapentin (NEURONTIN) 100 MG capsule TAKE 2 CAPSULES (200MG) BY MOUTH THREE TIMES DAILY DX NEUROPATHY 180 capsule 11     hydrochlorothiazide (HYDRODIURIL) 25 MG tablet Take 1 tablet (25 mg) by mouth daily       HYDROcodone-acetaminophen (NORCO) 5-325 MG tablet Take 1 tablet by mouth every 4 hours as needed for severe pain 60 tablet 0     levothyroxine (SYNTHROID/LEVOTHROID) 137 MCG tablet TAKE 1 TABLET BY MOUTH EVERY DAY DX HYPOTHYROIDISM 30 tablet 11     losartan (COZAAR) 50 MG tablet TAKE TABLET BY MOUTH EVERY MORNING 30 tablet 11     magnesium citrate solution Take 296 mLs by mouth once as needed for constipation 296 mL 0     melatonin 3 MG tablet Take 1 tablet (3 mg) by mouth At Bedtime       mineral oil enema Place 1 enema rectally as needed for constipation 1 enema 0     Multiple Vitamin (TAB-A-KATIA) TABS TAKE 1 TABLET BY MOUTH EVERY DAY FOR SUPPLEMENT, WEIGHT LOSS 100 tablet 11     omeprazole (PRILOSEC) 20 MG DR capsule TAKE 1 CAPSULE BY MOUTH TWICE DAILY DX GASTROESOPHAGEAL REFLUX DISEASE 60 capsule 11     polyethylene glycol (MIRALAX) powder Take 17 g by mouth  daily 510 g 1     polyethylene glycol (MIRALAX/GLYCOLAX) packet Take 17 g by mouth 2 times daily       propylene glycol (SYSTANE BALANCE) 0.6 % SOLN ophthalmic solution Place 1 drop into both eyes 4 times daily as needed for dry eyes       ranitidine (ZANTAC) 150 MG tablet TAKE 1 TABLET BY MOUTH DAILY AT BEDTIME DX GASTROESOPHAGEAL REFLUX DISEASE 30 tablet 11     sertraline (ZOLOFT) 25 MG tablet Take 3 tablets (75 mg) by mouth At Bedtime 60 tablet 0     Simethicone (GAS RELIEF) 125 MG CAPS Take 125 mg by mouth 4 times daily as needed (flatulence/gas)       SM PAIN RELIEVER EX  MG tablet TAKE 1 TABLET BY MOUTH THREE TIMES DAILY FOR BACK PAIN (MAX APAP 3GM/24HRS);TAKE 1-2 TABLETS (500-1000MG) BY MOUTH THREE TIMES DAILY AS NEED 100 tablet 11     triamcinolone (KENALOG) 0.1 % external cream Apply topically 2 times daily as needed for irritation       VITAMIN D3 1000 units tablet TAKE 2 TABLETS (2000IU) BY MOUTH EVERY DAY DX OSTEOPOROSIS 100 tablet 11     Zinc Oxide (DESITIN) 13 % CREA Twice daily to rashy area until resolved       REVIEW OF SYSTEMS:  4 point ROS including Respiratory, CV, GI and , other than that noted in the HPI,  is negative    Objective:  BP (!) 175/84   Pulse 78   Temp 97.8  F (36.6  C)   Resp 18   Wt 69.5 kg (153 lb 3.2 oz)   SpO2 92%   BMI 24.73 kg/m    Exam:  GENERAL APPEARANCE:  Alert, in mod physical distress , with pain in R abdomen  HEAD:  Normal, normocephalic, atraumatic  ENT:  Mouth and posterior oropharynx normal, moist mucous membranes, hearing acuity - within normal limits   EYE EXAM: normal external eye, conjunctiva, lids, JONO  CHEST/RESP:  respiratory effort normal, lung sounds CTA , no respiratory distress  CV:  Rate regreg, rhythm reg, no murmur, no peripheral edema  M/S:   extremities abnormal, gait abnormal-wearing CAM walker boot, short distances with rolling walker and longer distances with wheelchair   NEUROLOGIC EXAM: Normal gross motor movement, tone and  coordination. No tremor. Cranial nerves 2-12 are normal tested and grossly at patient's baseline  PSYCH:  Alert and oriented to person-place-time , affect anxious with perseverative/repetitive questions, ongoing anxiety about medications/bowels,      Labs:     Most Recent 3 CBC's:  Recent Labs   Lab Test 08/23/19 2229 03/02/19  1552 02/12/19   WBC 10.6 5.5 6.1   HGB 12.7 12.8 11.3*    98 99    190 191     Most Recent 3 BMP's:  Recent Labs   Lab Test 08/23/19 2229 03/02/19  1552 02/12/19    140 139   POTASSIUM 3.7 3.7 4.0   CHLORIDE 101 101 101   CO2 34* 33* 32*   BUN 18 12 12   CR 0.53 0.54 0.68   ANIONGAP 4 6 6   YADIRA 8.6 8.6 9.4   GLC 90 120* 99     Most Recent 2 LFT's:  Recent Labs   Lab Test 08/23/19 2229 03/02/19  1552   AST 19 18   ALT 13 11   ALKPHOS 65 53   BILITOTAL 0.4 0.4       ASSESSMENT/PLAN:  Chronic idiopathic constipation  Flatulence, eructation, and gas pain  Patient with history of chronic constipation, not well controlled and causing significant abdominal pain.  Previously on fiber, miralax daily but still constipated.  Started on colace at ED and given mag citrate with little improvement.  - increase polyethylene glycol (MIRALAX/GLYCOLAX) packet; Take 17 g by mouth 2 times daily  - continue colace indefinitely  -recheck in about 2 weeks    Adjustment disorder with mixed anxiety and depressed mood  Ongoing anxiety, family reports some improvement over last weeks/months as demonstrated by less perseverative and rambling thoughts, more relaxed facial expressions.  She remains very anxious and perseverates on bowels, pain, etc. Discussed options, and will increase zoloft to 75 mg daily, monitor BP (as she does have significant history of hypotension).   - sertraline (ZOLOFT) 25 MG tablet; Take 3 tablets (75 mg) by mouth At Bedtime    Chronic bilateral low back pain with bilateral sciatica  Patient with chronic LBP, shoulder pain, now foot pain.  She has Norco prn but  consistently takes about 3 times per day, occasionally 4 times per day.  She is counseled to not take the Norco if it is not needed, she verbalizes understanding.  Discussed option of NSAID product but reports significant nausea many years ago when taking that, so does not want to try it.    - acetaminophen (TYLENOL) 500 MG tablet; Take 1 tablet (500 mg) by mouth 3 times daily  -continue prn norco for now    Essential hypertension  Orthostatic hypotension dysautonomic syndrome (H)  Patient with significant hypertension, often orthostatic hypotension.  Wide ranges of BP, with systolic sometimes <90 causing dizziness.  No recent falls.  On hydrochlorothiazide, losartan.  Needs close supervision, careful monitoring. May need increase of hydrochlorothiazide.    BP Readings from Last 6 Encounters:   08/27/19 (!) 175/84   08/24/19 (!) 181/99   07/30/19 (!) 165/89   07/02/19 165/85   06/28/19 186/84   05/14/19 149/88   -check BP daily x 14 days    Multiple system atrophy (H)  Stable, follows with neurology and on Sinemet with no new symptoms.     Pain in joint, ankle and foot, left  Reported MSK issue, no fracture, following with [facility] and wearing a CAM walker boot for now.  The current medical regimen is effective;  continue present plan and medications. She is supposed to try some Diclofenac Gel, but prior authorization to the insurance company has been delayed-[facility] apparently is working on this.       Orders written by provider at facility and transcribed by : Te Sargent  1. Increase Miralax to 17 mg PO BID  2. Increase Sertraline to 75 mg PO at bedtime  3. Continue Colace 100 mg PO BID ( Don't end after 14 days)       Electronically signed by:  HAILEE Contreras CNP

## 2019-08-28 PROBLEM — R14.3 FLATULENCE, ERUCTATION, AND GAS PAIN: Status: ACTIVE | Noted: 2019-08-28

## 2019-08-28 PROBLEM — R14.1 FLATULENCE, ERUCTATION, AND GAS PAIN: Status: ACTIVE | Noted: 2019-08-28

## 2019-08-28 PROBLEM — R14.2 FLATULENCE, ERUCTATION, AND GAS PAIN: Status: ACTIVE | Noted: 2019-08-28

## 2019-08-28 RX ORDER — POLYETHYLENE GLYCOL 3350 17 G/17G
17 POWDER, FOR SOLUTION ORAL 2 TIMES DAILY
Start: 2019-08-28 | End: 2019-12-31

## 2019-08-28 RX ORDER — ACETAMINOPHEN 500 MG
500 TABLET ORAL 3 TIMES DAILY
Refills: 0
Start: 2019-08-28 | End: 2020-06-15

## 2019-08-28 RX ORDER — SERTRALINE HYDROCHLORIDE 25 MG/1
75 TABLET, FILM COATED ORAL AT BEDTIME
Qty: 60 TABLET | Refills: 0
Start: 2019-08-28 | End: 2020-06-17

## 2019-09-10 ENCOUNTER — ASSISTED LIVING VISIT (OUTPATIENT)
Dept: GERIATRICS | Facility: CLINIC | Age: 79
End: 2019-09-10
Payer: COMMERCIAL

## 2019-09-10 ENCOUNTER — TRANSFERRED RECORDS (OUTPATIENT)
Dept: HEALTH INFORMATION MANAGEMENT | Facility: CLINIC | Age: 79
End: 2019-09-10

## 2019-09-10 VITALS
DIASTOLIC BLOOD PRESSURE: 72 MMHG | RESPIRATION RATE: 18 BRPM | TEMPERATURE: 98.1 F | SYSTOLIC BLOOD PRESSURE: 148 MMHG | OXYGEN SATURATION: 96 % | BODY MASS INDEX: 23.98 KG/M2 | HEART RATE: 87 BPM | WEIGHT: 148.6 LBS

## 2019-09-10 DIAGNOSIS — M54.42 CHRONIC BILATERAL LOW BACK PAIN WITH BILATERAL SCIATICA: ICD-10-CM

## 2019-09-10 DIAGNOSIS — M25.572 PAIN IN JOINT, ANKLE AND FOOT, LEFT: ICD-10-CM

## 2019-09-10 DIAGNOSIS — R60.0 EDEMA OF LEFT LOWER EXTREMITY: ICD-10-CM

## 2019-09-10 DIAGNOSIS — M54.41 CHRONIC BILATERAL LOW BACK PAIN WITH BILATERAL SCIATICA: ICD-10-CM

## 2019-09-10 DIAGNOSIS — K59.04 CHRONIC IDIOPATHIC CONSTIPATION: Primary | ICD-10-CM

## 2019-09-10 DIAGNOSIS — G89.29 CHRONIC BILATERAL LOW BACK PAIN WITH BILATERAL SCIATICA: ICD-10-CM

## 2019-09-10 RX ORDER — ASPIRIN 81 MG
100 TABLET, DELAYED RELEASE (ENTERIC COATED) ORAL DAILY
COMMUNITY
End: 2020-02-04

## 2019-09-10 NOTE — PROGRESS NOTES
[facility]  [place] [facility] Record Number:  [id number]  Place of Service where encounter took place:  [facility] ([facility] [[id number]]  Chief Complaint   Patient presents with     RECHECK       HPI:    Mehreen Camara  is a 79 year old (1940), who is being seen today for an episodic care visit.  HPI information obtained from: facility chart records, facility staff, patient report, [facility] chart review and family/first contact dtr Irasema report. Today's concern is:     Chronic idiopathic constipation  Chronic bilateral low back pain with bilateral sciatica  Pain in joint, ankle and foot, left  Edema of left lower extremity   Mehreen reports increased pain and swelling of L LE which is causing her difficulty with ambulation.  Nursing reports no other new concerns .       Past Medical and Surgical History reviewed in Epic today.    MEDICATIONS:  Current Outpatient Medications   Medication Sig Dispense Refill     acetaminophen (TYLENOL) 500 MG tablet Take 1 tablet (500 mg) by mouth 3 times daily  0     betamethasone dipropionate (DIPROSONE) 0.05 % external cream Apply topically 2 times daily as needed       bisacodyl (DULCOLAX) 10 MG suppository Place 10 mg rectally every 3 days As needed for constipation       calcium carbonate (TUMS) 500 MG chewable tablet Take 1 tablet (500 mg) by mouth every 2 hours as needed for heartburn 150 tablet 1     carbidopa-levodopa (SINEMET)  MG tablet Take 2 tablets by mouth 3 times daily       diclofenac (VOLTAREN) 1 % topical gel Apply 4 g topically 4 times daily as needed for moderate pain (to back and neck) 100 g 11     docusate sodium (COLACE) 100 MG tablet Take 100 mg by mouth daily       gabapentin (NEURONTIN) 100 MG capsule TAKE 2 CAPSULES (200MG) BY MOUTH THREE TIMES DAILY DX NEUROPATHY 180 capsule 11     hydrochlorothiazide (HYDRODIURIL) 25 MG tablet Take 1 tablet (25 mg) by mouth daily       HYDROcodone-acetaminophen (NORCO) 5-325 MG tablet  Take 1 tablet by mouth every 4 hours as needed for severe pain 60 tablet 0     levothyroxine (SYNTHROID/LEVOTHROID) 137 MCG tablet TAKE 1 TABLET BY MOUTH EVERY DAY DX HYPOTHYROIDISM 30 tablet 11     losartan (COZAAR) 50 MG tablet TAKE TABLET BY MOUTH EVERY MORNING 30 tablet 11     magnesium citrate solution Take 296 mLs by mouth once as needed for constipation 296 mL 0     melatonin 3 MG tablet Take 1 tablet (3 mg) by mouth At Bedtime       mineral oil enema Place 1 enema rectally as needed for constipation 1 enema 0     Multiple Vitamin (TAB-A-KATIA) TABS TAKE 1 TABLET BY MOUTH EVERY DAY FOR SUPPLEMENT, WEIGHT LOSS 100 tablet 11     omeprazole (PRILOSEC) 20 MG DR capsule TAKE 1 CAPSULE BY MOUTH TWICE DAILY DX GASTROESOPHAGEAL REFLUX DISEASE 60 capsule 11     polyethylene glycol (MIRALAX/GLYCOLAX) packet Take 17 g by mouth 2 times daily       propylene glycol (SYSTANE BALANCE) 0.6 % SOLN ophthalmic solution Place 1 drop into both eyes 4 times daily as needed for dry eyes       ranitidine (ZANTAC) 150 MG tablet TAKE 1 TABLET BY MOUTH DAILY AT BEDTIME DX GASTROESOPHAGEAL REFLUX DISEASE 30 tablet 11     sertraline (ZOLOFT) 25 MG tablet Take 3 tablets (75 mg) by mouth At Bedtime 60 tablet 0     Simethicone (GAS RELIEF) 125 MG CAPS Take 125 mg by mouth 4 times daily as needed (flatulence/gas)       SM PAIN RELIEVER EX  MG tablet TAKE 1 TABLET BY MOUTH THREE TIMES DAILY FOR BACK PAIN (MAX APAP 3GM/24HRS);TAKE 1-2 TABLETS (500-1000MG) BY MOUTH THREE TIMES DAILY AS NEED 100 tablet 11     triamcinolone (KENALOG) 0.1 % external cream Apply topically 2 times daily as needed for irritation       VITAMIN D3 1000 units tablet TAKE 2 TABLETS (2000IU) BY MOUTH EVERY DAY DX OSTEOPOROSIS 100 tablet 11     Zinc Oxide (DESITIN) 13 % CREA Twice daily to rashy area until resolved       REVIEW OF SYSTEMS:  4 point ROS including Respiratory, CV, GI and , other than that noted in the HPI,  is negative    Objective:  BP (!) 148/72    Pulse 87   Temp 98.1  F (36.7  C)   Resp 18   Wt 67.4 kg (148 lb 9.6 oz)   SpO2 96%   BMI 23.98 kg/m    Exam:  GENERAL APPEARANCE:  Alert, in mod distress   HEAD:  Normal, normocephalic, atraumatic  ENT:  Mouth and posterior oropharynx normal, moist mucous membranes, hearing acuity - within normal limits   EYE EXAM: normal external eye, conjunctiva, lids, JONO  CHEST/RESP:  respiratory effort normal, lung sounds CTA , no respiratory distress  CV:  Rate reg, rhythm reg, no murmur, 3+ peripheral edema to L LE below the knee  Pedal pulses +, + CMS, pain with extension and rotation of foot and ankle  M/S:   extremities abnormal, gait abnormal-difficulty with ambulation and causing significant heal pain  NEUROLOGIC EXAM: Normal gross motor movement, tone and coordination. No tremor. Cranial nerves 2-12 are normal tested and grossly at patient's baseline  PSYCH:  Alert and oriented to person-place-time , affect pleasant/anxious     Labs:   Recent labs in [facility] reviewed by me today.    L LE xray done by mobile xray service (professional portable xray) and no fractures/bony malformations noted.       ASSESSMENT/PLAN:  Chronic idiopathic constipation  Patient with ongoing constipation, feels as if she does not empty all the time.  Ate [place] toast for breakfast and now with significant abdominal pain and bloating, feeling as if she has to have BM, but only a small amount out.  She reports she always feels better after she has a suppository, then back pain resolves.  She is on colace, fiber, miralax bid, prn dulc supp, prn enema, prn mag citrate.  -trial routine dulc supp q3d for a couple of weeks to see if this is helpful  -could consider trial of metoclopramide to improve motility, but would prefer not to use another medication  -prn miralax OK     Chronic bilateral low back pain with bilateral sciatica  Chronic neck, back, shoulder pain and now increased pain in foot.  She is trying to stop Norco use, but is unable.      Pain in joint, ankle and foot, left  Edema of left lower extremity  New gradual onset of increase LE edema in L leg-from just below knee.  She has been seen by ortho with previous MRI noting some inflammation near the joint/concerning for muscle tear/sprain.  She was given a CAM walker boot with some initial relief of pain.  Now, pain is returned and edema is much worse.  Xray negative for fracture.  Venous US L LE negative for DVT, but does show a large hypoechoic complex mass in the posterior calf with mild vascularity      Patient to have MRI on 9/12/19-family will take her    Orders written by provider at facility and transcribed by : Te Sargent  1. Venous Ultrasound LLE today Stat, Dx: Rule out DVT  2. X ray of L ankle, tib/fib Dx: Rule out fracture  3. Starting today, give Dulcolax supp 1 rectally every 3 days Dx: Chronic constipation x2 weeks  4. Gradually decrease use of cam Walker boot to discontinue over next 3-5 days Dx: Improve pain  5. Miralax 17 gm every day PRN PO Dx: Constipation      Electronically signed by:  HAILEE Contreras CNP

## 2019-09-11 ENCOUNTER — TELEPHONE (OUTPATIENT)
Dept: OTHER | Facility: CLINIC | Age: 79
End: 2019-09-11

## 2019-09-11 RX ORDER — POLYETHYLENE GLYCOL 3350 17 G/17G
17 POWDER, FOR SOLUTION ORAL DAILY PRN
Start: 2019-09-11 | End: 2020-06-15

## 2019-09-11 NOTE — TELEPHONE ENCOUNTER
Pt referred to [facility] by Cathy Sargent APRN CNP for LE edema, calf pain, hypoechoic mass in posterior calf, pending MRI.    6/28/19 LLE venous US negative for DVT, in Epic.  Per Care Everywhere, pt is s/p [facility] on 11/30/15 at [facility].    Pt needs to be scheduled for consult with Vascular Medicine.  Will route to scheduling to coordinate an appointment at next available.    PREETI MonetN RN

## 2019-09-11 NOTE — TELEPHONE ENCOUNTER
Irasema (daughter) called and patient is scheduled for a Consult Appointment with Dr Parkinson in [place] 10/02/19. Patient's MRI is scheduled for 09/12/19.

## 2019-09-12 ENCOUNTER — HOSPITAL ENCOUNTER (OUTPATIENT)
Dept: MRI IMAGING | Facility: CLINIC | Age: 79
Discharge: HOME | End: 2019-09-12
Attending: NURSE PRACTITIONER | Admitting: NURSE PRACTITIONER
Payer: COMMERCIAL

## 2019-09-12 DIAGNOSIS — M25.572 PAIN IN JOINT, ANKLE AND FOOT, LEFT: ICD-10-CM

## 2019-09-12 DIAGNOSIS — R60.0 EDEMA OF LEFT LOWER EXTREMITY: ICD-10-CM

## 2019-09-12 PROCEDURE — 25500064 ZZH RX 255 OP 636: Performed by: NURSE PRACTITIONER

## 2019-09-12 PROCEDURE — A9585 GADOBUTROL INJECTION: HCPCS | Performed by: NURSE PRACTITIONER

## 2019-09-12 PROCEDURE — 73720 MRI LWR EXTREMITY W/O&W/DYE: CPT | Mod: LT

## 2019-09-12 RX ORDER — GADOBUTROL 604.72 MG/ML
7 INJECTION INTRAVENOUS ONCE
Status: COMPLETED | OUTPATIENT
Start: 2019-09-12 | End: 2019-09-12

## 2019-09-12 RX ADMIN — GADOBUTROL 7 ML: 604.72 INJECTION INTRAVENOUS at 13:37

## 2019-09-13 ENCOUNTER — TELEPHONE (OUTPATIENT)
Dept: GERIATRICS | Facility: CLINIC | Age: 79
End: 2019-09-13

## 2019-09-13 ENCOUNTER — TELEPHONE (OUTPATIENT)
Dept: GERIATRICS | Facility: CLINIC | Age: 79
End: 2019-09-13
Payer: COMMERCIAL

## 2019-09-13 DIAGNOSIS — M54.41 CHRONIC BILATERAL LOW BACK PAIN WITH BILATERAL SCIATICA: ICD-10-CM

## 2019-09-13 DIAGNOSIS — M54.42 CHRONIC BILATERAL LOW BACK PAIN WITH BILATERAL SCIATICA: ICD-10-CM

## 2019-09-13 DIAGNOSIS — G89.29 CHRONIC BILATERAL LOW BACK PAIN WITH BILATERAL SCIATICA: ICD-10-CM

## 2019-09-13 DIAGNOSIS — M79.605 PAIN AND SWELLING OF LEFT LOWER EXTREMITY: ICD-10-CM

## 2019-09-13 DIAGNOSIS — M79.89 PAIN AND SWELLING OF LEFT LOWER EXTREMITY: ICD-10-CM

## 2019-09-13 DIAGNOSIS — S86.012A RUPTURE OF LEFT ACHILLES TENDON, INITIAL ENCOUNTER: ICD-10-CM

## 2019-09-13 DIAGNOSIS — M79.672 PAIN OF LEFT HEEL: Primary | ICD-10-CM

## 2019-09-13 PROCEDURE — 99358 PROLONG SERVICE W/O CONTACT: CPT | Performed by: NURSE PRACTITIONER

## 2019-09-13 RX ORDER — HYDROCODONE BITARTRATE AND ACETAMINOPHEN 5; 325 MG/1; MG/1
1 TABLET ORAL EVERY 4 HOURS PRN
Qty: 200 TABLET | Refills: 0 | Status: SHIPPED | OUTPATIENT
Start: 2019-09-13 | End: 2019-11-08

## 2019-09-13 NOTE — TELEPHONE ENCOUNTER
[facility]  NON-FACE TO FACE PROLONGED SERVICE    Mehreen OBRIEN Reynarichhorace 1940    Date of Related Face to Face Service: 9/10/19    INTENT OF SERVICE  This is an extended evaluation of patient's specific problem.  The service relates to a recent or future E/M visit.  Documentation supports medical necessity, relates to ongoing patient management and documents start times and stop times.    Regarding the following diagnoses:     Pain of left heel  Pain and swelling of left lower extremity  Rupture of left Achilles tendon, initial encounter,   * I reviewed records for patient's complicated medical history.  (Start time 0645  Stop time 0700)  * I discussed with SUKHJINDER Morin who is [facility] consult of the patient.  (Start time 0700  Stop time 0710)  * I discussed with MAYNOR Rogers at [facility] who is specialist the patient has used in the past.  (Start time 0900  Stop time 0904, and start time 0950 to stop time 0951)  * I discussed with [facility] who is daughter of the patient.  (Start time 0923  Stop time 0930)  * I discussed with Mehreen who is the patient.  (Start time 0940  Stop time 0945)  * I coordinated care with [facility] assisted living facility member.  (Start time 0933  Stop time 0938)    ASSESSMENT/PLAN     Pain of left heel  Pain and swelling of left lower extremity  Rupture of left Achilles tendon, initial encounter    Patient with chronic L LE pain, has been following with [facility] and wearing CAM walker boot for several weeks.  Not much improvement.  Original MRI done at [facility] in May/June not available for me to view, but report revealed suggestive of tendonitis.  She was originally improving, then worse last several days. She did hit her leg on the door frame during a fire drill about 2 weeks ago.  She has noticed gradual worsening of the pain since that time.      Xray ankle negative for fracture, Venous US negative for DVT but showed hypoechoic complex mass in  posterior calf    MRI results of 9/12/19 reveal complete tear of achilles tendon with 6 cm separation gap.      Reviewed results as noted above.  Patient will be seen by [facility] today to refit for CAM walker boot, will have surgeon consult as soon as this can be arranged.  She will be non weight bearing until decision has been made about next steps. She will use wheelchair for all mobility.    Appropriate orders communicated to all involved parties, patient and daughter verbalize understanding of next steps.       TIME  Total time spent with Non-Face to Face prolonged service 47 minutes.     Electronically signed by:  HAILEE Contreras CNP

## 2019-09-13 NOTE — TELEPHONE ENCOUNTER
Ortho Nursing home visit    Mehreen Camara is a 79 year old female who resides in [facility].    Patient has had recent MRI to eval Left ankle pain. Has been seen by [facility] in the past, records are not available at this time. Patient has been wearing CAM boot 2nd to pain.      Past Medical History:   Diagnosis Date     Adrenal adenoma     stable, thought not to be hormonally active     Arthritis      Depressive disorder      Hypertension      Rheumatic fever     thought to have had as a child     Small bowel obstruction (H)      Thyroid disease       Past Surgical History:   Procedure Laterality Date     AAA REPAIR  2015    wtih bifurcation graft     CARPAL TUNNEL RELEASE RT/LT Bilateral 2013     HYSTERECTOMY  2013     JOINT REPLACEMENT Right 2003     LAPAROSCOPIC CHOLECYSTECTOMY N/A 12/12/2018    Procedure: LAPAROSCOPIC CHOLECYSTECTOMY;  Surgeon: Amadeo Sebastian MD;  Location: [facility] OR     ORTHOPEDIC SURGERY       SPINE SURGERY  1981    cervical spine fusion     THYROIDECTOMY  2011        Allergies   Allergen Reactions     Penicillins Anaphylaxis, Rash and Shortness Of Breath     Aspirin Nausea     Atenolol Cough     Atorvastatin Muscle Pain (Myalgia)     Bupropion Nausea     Clindamycin Other (See Comments)     Constipation      Codeine Nausea     Ibuprofen Nausea     Stomach upset     Lovastatin Muscle Pain (Myalgia)     Cephalexin Rash     Neck reddness      There were no vitals taken for this visit.     MRI done 9/12/2019 shows complete tear of achilles tendon with 6 CM separation gap;        ASSESSMENT / PLAN:  Sent message to NP: that patient needs eval for surgical repair, vs grafting to Left achilles.   Should not attempt ambulation given fall risk.     If patient is currently under care of [facility], would rec seeing Ortho surgeon >              Jodi PITT  [phone] Cell

## 2019-09-18 ENCOUNTER — AMBULATORY - HEALTHEAST (OUTPATIENT)
Dept: OTHER | Facility: CLINIC | Age: 79
End: 2019-09-18

## 2019-09-18 ENCOUNTER — DOCUMENTATION ONLY (OUTPATIENT)
Dept: OTHER | Facility: CLINIC | Age: 79
End: 2019-09-18

## 2019-09-24 ENCOUNTER — ASSISTED LIVING VISIT (OUTPATIENT)
Dept: GERIATRICS | Facility: CLINIC | Age: 79
End: 2019-09-24
Payer: COMMERCIAL

## 2019-09-24 VITALS
SYSTOLIC BLOOD PRESSURE: 164 MMHG | WEIGHT: 148.6 LBS | RESPIRATION RATE: 18 BRPM | BODY MASS INDEX: 23.98 KG/M2 | TEMPERATURE: 98.1 F | OXYGEN SATURATION: 96 % | HEART RATE: 97 BPM | DIASTOLIC BLOOD PRESSURE: 68 MMHG

## 2019-09-24 DIAGNOSIS — M54.41 CHRONIC BILATERAL LOW BACK PAIN WITH BILATERAL SCIATICA: ICD-10-CM

## 2019-09-24 DIAGNOSIS — M54.42 CHRONIC BILATERAL LOW BACK PAIN WITH BILATERAL SCIATICA: ICD-10-CM

## 2019-09-24 DIAGNOSIS — S86.012D RUPTURE OF LEFT ACHILLES TENDON, SUBSEQUENT ENCOUNTER: ICD-10-CM

## 2019-09-24 DIAGNOSIS — M79.605 PAIN AND SWELLING OF LEFT LOWER EXTREMITY: ICD-10-CM

## 2019-09-24 DIAGNOSIS — R10.84 ABDOMINAL PAIN, GENERALIZED: ICD-10-CM

## 2019-09-24 DIAGNOSIS — F43.23 ADJUSTMENT DISORDER WITH MIXED ANXIETY AND DEPRESSED MOOD: ICD-10-CM

## 2019-09-24 DIAGNOSIS — M79.89 PAIN AND SWELLING OF LEFT LOWER EXTREMITY: ICD-10-CM

## 2019-09-24 DIAGNOSIS — K59.04 CHRONIC IDIOPATHIC CONSTIPATION: Primary | ICD-10-CM

## 2019-09-24 DIAGNOSIS — G89.29 CHRONIC BILATERAL LOW BACK PAIN WITH BILATERAL SCIATICA: ICD-10-CM

## 2019-09-24 NOTE — PROGRESS NOTES
[place] [facility]  [place] Medical Record Number:  [id number]  Place of Service where encounter took place:  [facility] ([facility] [[id number]]  Chief Complaint   Patient presents with     RECHECK       HPI:    Mehreen Camara  is a 79 year old (1940), who is being seen today for an episodic care visit.  HPI information obtained from: facility chart records, facility staff, patient report and [facility] chart review. Today's concern is:     Chronic idiopathic constipation  Abdominal pain, generalized  Rupture of left Achilles tendon, subsequent encounter  Chronic bilateral low back pain with bilateral sciatica  Pain and swelling of left lower extremity  Adjustment disorder with mixed anxiety and depressed mood   Mehreen reports ongoing pain in L heel, difficulty with use of CAM walker boot, ongoing constipation, ongoing abdominal pain.  Nursing reports no other new concerns, ongoing stable concerns.  She is being transported to dining room in wheelchair, using walker about her apartment with goal to be non weight bearing to L LE as much as possible.       Past Medical and Surgical History reviewed in Epic today.    MEDICATIONS:  Current Outpatient Medications   Medication Sig Dispense Refill     acetaminophen (TYLENOL) 500 MG tablet Take 1 tablet (500 mg) by mouth 3 times daily  0     betamethasone dipropionate (DIPROSONE) 0.05 % external cream Apply topically 2 times daily as needed       bisacodyl (DULCOLAX) 10 MG suppository Place 10 mg rectally every 3 days As needed for constipation       calcium carbonate (TUMS) 500 MG chewable tablet Take 1 tablet (500 mg) by mouth every 2 hours as needed for heartburn 150 tablet 1     carbidopa-levodopa (SINEMET)  MG tablet Take 2 tablets by mouth 3 times daily       diclofenac (VOLTAREN) 1 % topical gel Apply 4 g topically 4 times daily as needed for moderate pain (to back and neck) 100 g 11     docusate sodium (COLACE) 100 MG tablet Take 100 mg by  mouth daily       gabapentin (NEURONTIN) 100 MG capsule TAKE 2 CAPSULES (200MG) BY MOUTH THREE TIMES DAILY DX NEUROPATHY 180 capsule 11     hydrochlorothiazide (HYDRODIURIL) 25 MG tablet Take 1 tablet (25 mg) by mouth daily       HYDROcodone-acetaminophen (NORCO) 5-325 MG tablet Take 1 tablet by mouth every 4 hours as needed for severe pain 200 tablet 0     levothyroxine (SYNTHROID/LEVOTHROID) 137 MCG tablet TAKE 1 TABLET BY MOUTH EVERY DAY DX HYPOTHYROIDISM 30 tablet 11     losartan (COZAAR) 50 MG tablet TAKE TABLET BY MOUTH EVERY MORNING 30 tablet 11     magnesium citrate solution Take 296 mLs by mouth once as needed for constipation 296 mL 0     melatonin 3 MG tablet Take 1 tablet (3 mg) by mouth At Bedtime       mineral oil enema Place 1 enema rectally as needed for constipation 1 enema 0     Multiple Vitamin (TAB-A-KATIA) TABS TAKE 1 TABLET BY MOUTH EVERY DAY FOR SUPPLEMENT, WEIGHT LOSS 100 tablet 11     omeprazole (PRILOSEC) 20 MG DR capsule TAKE 1 CAPSULE BY MOUTH TWICE DAILY DX GASTROESOPHAGEAL REFLUX DISEASE 60 capsule 11     polyethylene glycol (MIRALAX/GLYCOLAX) packet Take 17 g by mouth daily as needed for constipation       polyethylene glycol (MIRALAX/GLYCOLAX) packet Take 17 g by mouth 2 times daily       propylene glycol (SYSTANE BALANCE) 0.6 % SOLN ophthalmic solution Place 1 drop into both eyes 4 times daily as needed for dry eyes       ranitidine (ZANTAC) 150 MG tablet TAKE 1 TABLET BY MOUTH DAILY AT BEDTIME DX GASTROESOPHAGEAL REFLUX DISEASE 30 tablet 11     sertraline (ZOLOFT) 25 MG tablet Take 3 tablets (75 mg) by mouth At Bedtime 60 tablet 0     Simethicone (GAS RELIEF) 125 MG CAPS Take 125 mg by mouth 4 times daily as needed (flatulence/gas)       SM PAIN RELIEVER EX  MG tablet TAKE 1 TABLET BY MOUTH THREE TIMES DAILY FOR BACK PAIN (MAX APAP 3GM/24HRS);TAKE 1-2 TABLETS (500-1000MG) BY MOUTH THREE TIMES DAILY AS NEED 100 tablet 11     triamcinolone (KENALOG) 0.1 % external cream Apply  topically 2 times daily as needed for irritation       VITAMIN D3 1000 units tablet TAKE 2 TABLETS (2000IU) BY MOUTH EVERY DAY DX OSTEOPOROSIS 100 tablet 11     Zinc Oxide (DESITIN) 13 % CREA Twice daily to rashy area until resolved       REVIEW OF SYSTEMS:  4 point ROS including Respiratory, CV, GI and , other than that noted in the HPI,  is negative    Objective:  BP (!) 164/68   Pulse 97   Temp 98.1  F (36.7  C)   Resp 18   Wt 67.4 kg (148 lb 9.6 oz)   SpO2 96%   BMI 23.98 kg/m    Exam:  GENERAL APPEARANCE:  Alert, in no acute distress   HEAD:  Normal, normocephalic, atraumatic  ENT:  Mouth and posterior oropharynx normal, moist mucous membranes, hearing acuity - within normal limits   EYE EXAM: normal external eye, conjunctiva, lids, JONO  CHEST/RESP:  respiratory effort normal, lung sounds CTA , no respiratory distress  CV:  Rate reg, rhythm reg, no murmur, no peripheral edema  M/S:   extremities normal, gait abnormal-limited weight bearing, CAM walker boot  NEUROLOGIC EXAM: Normal gross motor movement, tone and coordination. No tremor. Cranial nerves 2-12 are normal tested and grossly at patient's baseline  PSYCH:  Alert and oriented to person-place-time with forgetfulness, affect anxious     Labs:   Recent labs in [facility] reviewed by me today.     ASSESSMENT/PLAN:  Chronic idiopathic constipation  Abdominal pain, generalized  Patient with ongoing chronic constipation, feeling as if she does not empty well, taking miralax and prn enema, did feel a bit better with q3d suppository.    -suppository q3d   -could consider reglan but concerns for side effects of anxiety, restlessness, depression, EPS-which may easily be exacerbated in her situation.     Rupture of left Achilles tendon, subsequent encounter  Pain and swelling of L lower extremity  Working with [facility] and conservative treatment planned at this time.  Wearing CAM walker boot with heel lift, has follow up appointment next week with them.   Continues with heel pain, swelling of L LE, without significant changes. She is frustrated with the use of the boot but is compliant at this time. She reports feeling weaker as she cannot walk like she used to, cannot do exercises like she used to.  She does not like home care therapy services, may benefit from outpatient where she can use the equipment to build strength in unaffected limbs  -outpatient therapy at [facility] for strengthening.     Chronic bilateral low back pain with bilateral sciatica  Continues with bilateral low back pain, with sciatica.  On gabapentin, tylenol and Norco.  She reports no worsening of back pain, no improvement either.     Adjustment disorder with mixed anxiety and depressed mood  Stable on sertraline. She remains anxious and depressed, recent increase of sertraline.       Orders written by provider at facility and transcribed by : Te Sargent  1. Dulcolax suppository Q3 days Rectally Dx: Chronic constipation  2. Outpatient PT/OT to evaluate and treat for upper body strengthening, RLE Strengthening and maintenance of function while limited WB on RLE(at [place])  3. discontinue Natural fiber 1 tsp daily      Electronically signed by:  HAILEE Contreras CNP

## 2019-11-05 ENCOUNTER — HEALTH MAINTENANCE LETTER (OUTPATIENT)
Age: 79
End: 2019-11-05

## 2019-11-07 NOTE — PROGRESS NOTES
[place] [facility]  [place] Medical Record Number:  [id number]  Place of Service where encounter took place:  [facility] [[id number]]  Chief Complaint   Patient presents with     RECHECK       HPI:    Mehreen Camara  is a 79 year old (1940), who is being seen today for an episodic care visit.  HPI information obtained from: facility chart records, facility staff, patient report and [facility] chart review. Today's concern is:     Rupture of left Achilles tendon, subsequent encounter  Neuropathy  Chronic bilateral low back pain with bilateral sciatica  Adjustment disorder with mixed anxiety and depressed mood  Multiple system atrophy (H)  Orthostatic hypotension dysautonomic syndrome (H)     eMhreen reports she continues to wear the CAM walker boot due to tendon rupture-is in the process of getting a brace to wear, she reports ongoing BP fluctuations, ongoing pain, no significant changes in condition .  Nursing reports no new concerns .       Past Medical and Surgical History reviewed in Epic today.    MEDICATIONS:  Current Outpatient Medications   Medication Sig Dispense Refill     acetaminophen (TYLENOL) 500 MG tablet Take 500-1,000 mg by mouth 3 times daily as needed for mild pain       acetaminophen (TYLENOL) 500 MG tablet Take 1 tablet (500 mg) by mouth 3 times daily  0     betamethasone dipropionate (DIPROSONE) 0.05 % external cream Apply topically 2 times daily as needed       bisacodyl (DULCOLAX) 10 MG suppository Place 10 mg rectally every 3 days As needed for constipation       calcium carbonate (TUMS) 500 MG chewable tablet Take 1 tablet (500 mg) by mouth every 2 hours as needed for heartburn 150 tablet 1     carbidopa-levodopa (SINEMET)  MG tablet Take 2 tablets by mouth 3 times daily       diclofenac (VOLTAREN) 1 % topical gel Apply 4 g topically 4 times daily as needed for moderate pain (to back and neck) 100 g 11     docusate sodium (COLACE) 100 MG tablet Take 100 mg  by mouth daily       gabapentin (NEURONTIN) 100 MG capsule TAKE 2 CAPSULES (200MG) BY MOUTH THREE TIMES DAILY DX NEUROPATHY 180 capsule 11     HYDROcodone-acetaminophen (NORCO) 5-325 MG tablet Take 1 tablet by mouth every 4 hours as needed for severe pain 200 tablet 0     levothyroxine (SYNTHROID/LEVOTHROID) 137 MCG tablet TAKE 1 TABLET BY MOUTH EVERY DAY DX HYPOTHYROIDISM 30 tablet 11     losartan (COZAAR) 50 MG tablet TAKE TABLET BY MOUTH EVERY MORNING 30 tablet 11     magnesium citrate solution Take 296 mLs by mouth once as needed for constipation 296 mL 0     melatonin 3 MG tablet Take 1 tablet (3 mg) by mouth At Bedtime       mineral oil enema Place 1 enema rectally as needed for constipation 1 enema 0     Multiple Vitamin (TAB-A-KATIA) TABS TAKE 1 TABLET BY MOUTH EVERY DAY FOR SUPPLEMENT, WEIGHT LOSS 100 tablet 11     omeprazole (PRILOSEC) 20 MG DR capsule TAKE 1 CAPSULE BY MOUTH TWICE DAILY DX GASTROESOPHAGEAL REFLUX DISEASE 60 capsule 11     polyethylene glycol (MIRALAX/GLYCOLAX) packet Take 17 g by mouth daily as needed for constipation       polyethylene glycol (MIRALAX/GLYCOLAX) packet Take 17 g by mouth 2 times daily       propylene glycol (SYSTANE BALANCE) 0.6 % SOLN ophthalmic solution Place 1 drop into both eyes 4 times daily as needed for dry eyes       ranitidine (ZANTAC) 150 MG tablet TAKE 1 TABLET BY MOUTH DAILY AT BEDTIME DX GASTROESOPHAGEAL REFLUX DISEASE 30 tablet 11     sertraline (ZOLOFT) 25 MG tablet Take 3 tablets (75 mg) by mouth At Bedtime 60 tablet 0     Simethicone (GAS RELIEF) 125 MG CAPS Take 125 mg by mouth 4 times daily as needed (flatulence/gas)       SM PAIN RELIEVER EX  MG tablet TAKE 1 TABLET BY MOUTH THREE TIMES DAILY FOR BACK PAIN (MAX APAP 3GM/24HRS);TAKE 1-2 TABLETS (500-1000MG) BY MOUTH THREE TIMES DAILY AS NEED 100 tablet 11     triamcinolone (KENALOG) 0.1 % external cream Apply topically 2 times daily as needed for irritation       VITAMIN D3 1000 units tablet TAKE 2  TABLETS (2000IU) BY MOUTH EVERY DAY DX OSTEOPOROSIS 100 tablet 11     Zinc Oxide (DESITIN) 13 % CREA Twice daily to rashy area until resolved       hydrochlorothiazide (HYDRODIURIL) 25 MG tablet Take 1 tablet (25 mg) by mouth daily       REVIEW OF SYSTEMS:  4 point ROS including Respiratory, CV, GI and , other than that noted in the HPI,  is negative    Objective:  /73   Pulse 70   Temp 98.2  F (36.8  C)   Resp 18   Wt 68.2 kg (150 lb 6.4 oz)   SpO2 94%   BMI 24.28 kg/m    Exam:  GENERAL APPEARANCE:  Alert, in no acute distress at rest  HEAD:  Normal, normocephalic, atraumatic  ENT:  Mouth and posterior oropharynx normal, moist mucous membranes, hearing acuity - within normal limits   EYE EXAM:  EOM, conjunctivae, lids, pupils and irises normal   CHEST/RESP:  respiratory effort normal, no respiratory distress, lung sounds CTA    CV:  Rate and rhythm reg, no murmur/rub/gallop, no peripheral edema  M/S:   extremities abnormal, gait abnormal-walking short distances with CAM walker boot and uses wheelchair for longer distances, digits and nails baseline   NEUROLOGIC EXAM: Normal gross motor movement, tone and coordination. No tremor. Cranial nerves 2-12 are normal tested and grossly at patient's baseline  PSYCH:  Alert and oriented to person-place-time, affect pleasant     Labs:   Recent labs in [facility] reviewed by me today.     ASSESSMENT/PLAN:  Rupture of left Achilles tendon, subsequent encounter  Continues to work with [employer] re L achilles tendon rupture, they are working on a brace for her to wear as the area is not healing. Painful when she is not wearing the CAM walker    Neuropathy  Chronic bilateral low back pain with bilateral sciatica  Continues to have complaints of shooting pain in her back, and in L LE.  Using norco at least bid routinely and often has 1 more during the day.    -increase gabapentin for better control of pain   - HYDROcodone-acetaminophen (NORCO) 5-325 MG tablet; Take 1  tablet by mouth 2 times daily  - gabapentin (NEURONTIN) 100 MG capsule; Take 2-4 capsules (200-400 mg) by mouth 3 times daily 200 am, 200 noon, 400 at bedtime    Adjustment disorder with mixed anxiety and depressed mood  Stable on sertraline, her anxiety is better controlled than previously     Multiple system atrophy (H)  Orthostatic hypotension dysautonomic syndrome (H)  Remains on sinemet, no new symptoms.  She is ambulating without freezing episodes, BP remains variable - ranges 90's-190's systolic.       Orders written by provider at facility and transcribed by : Te Sargent  1. Increase Gabapentin to 200 mg AM and Noon and 400 mg at bedtime PO Dx: Neuropathic pain      Electronically signed by:  HAILEE Contreras CNP

## 2019-11-08 ENCOUNTER — ASSISTED LIVING VISIT (OUTPATIENT)
Dept: GERIATRICS | Facility: CLINIC | Age: 79
End: 2019-11-08
Payer: COMMERCIAL

## 2019-11-08 VITALS
HEART RATE: 70 BPM | TEMPERATURE: 98.2 F | OXYGEN SATURATION: 94 % | RESPIRATION RATE: 18 BRPM | SYSTOLIC BLOOD PRESSURE: 119 MMHG | WEIGHT: 150.4 LBS | DIASTOLIC BLOOD PRESSURE: 73 MMHG | BODY MASS INDEX: 24.28 KG/M2

## 2019-11-08 DIAGNOSIS — G62.9 NEUROPATHY: ICD-10-CM

## 2019-11-08 DIAGNOSIS — G89.29 CHRONIC BILATERAL LOW BACK PAIN WITH BILATERAL SCIATICA: ICD-10-CM

## 2019-11-08 DIAGNOSIS — S86.012D RUPTURE OF LEFT ACHILLES TENDON, SUBSEQUENT ENCOUNTER: Primary | ICD-10-CM

## 2019-11-08 DIAGNOSIS — G23.8 MULTIPLE SYSTEM ATROPHY (H): ICD-10-CM

## 2019-11-08 DIAGNOSIS — M54.42 CHRONIC BILATERAL LOW BACK PAIN WITH BILATERAL SCIATICA: ICD-10-CM

## 2019-11-08 DIAGNOSIS — F43.23 ADJUSTMENT DISORDER WITH MIXED ANXIETY AND DEPRESSED MOOD: ICD-10-CM

## 2019-11-08 DIAGNOSIS — M54.41 CHRONIC BILATERAL LOW BACK PAIN WITH BILATERAL SCIATICA: ICD-10-CM

## 2019-11-08 DIAGNOSIS — I95.1 ORTHOSTATIC HYPOTENSION DYSAUTONOMIC SYNDROME: ICD-10-CM

## 2019-11-08 DIAGNOSIS — G90.3 MULTIPLE SYSTEM ATROPHY (H): ICD-10-CM

## 2019-11-08 RX ORDER — GABAPENTIN 100 MG/1
CAPSULE ORAL
Qty: 180 CAPSULE | Refills: 11 | Status: CANCELLED
Start: 2019-11-08

## 2019-11-08 RX ORDER — ACETAMINOPHEN 500 MG
500-1000 TABLET ORAL 3 TIMES DAILY PRN
COMMUNITY
End: 2019-12-28

## 2019-11-09 RX ORDER — HYDROCODONE BITARTRATE AND ACETAMINOPHEN 5; 325 MG/1; MG/1
1 TABLET ORAL 2 TIMES DAILY
Qty: 200 TABLET | Refills: 0
Start: 2019-11-09 | End: 2019-11-12

## 2019-11-09 RX ORDER — GABAPENTIN 100 MG/1
200-400 CAPSULE ORAL 3 TIMES DAILY
Qty: 180 CAPSULE | Refills: 11
Start: 2019-11-09 | End: 2019-12-31

## 2019-11-11 ASSESSMENT — ENCOUNTER SYMPTOMS
FATIGUE: 1
FLANK PAIN: 0
LIGHT-HEADEDNESS: 1
ARTHRALGIAS: 1
JAUNDICE: 0
DEPRESSION: 1
LOSS OF CONSCIOUSNESS: 0
NAUSEA: 1
HYPOTENSION: 1
HEADACHES: 0
WEIGHT LOSS: 0
DIZZINESS: 1
BLOATING: 1
FEVER: 0
DECREASED CONCENTRATION: 1
POLYDIPSIA: 0
RECTAL PAIN: 0
DISTURBANCES IN COORDINATION: 1
MUSCLE WEAKNESS: 1
VOMITING: 0
HYPERTENSION: 1
BACK PAIN: 1
SPEECH CHANGE: 0
INSOMNIA: 1
TINGLING: 0
CONSTIPATION: 1
SKIN CHANGES: 0
DYSURIA: 0
SEIZURES: 0
MUSCLE CRAMPS: 1
JOINT SWELLING: 0
ALTERED TEMPERATURE REGULATION: 0
EYE IRRITATION: 0
DIARRHEA: 1
NIGHT SWEATS: 0
EXERCISE INTOLERANCE: 0
EYE REDNESS: 0
POLYPHAGIA: 0
WEIGHT GAIN: 0
DIFFICULTY URINATING: 0
LEG PAIN: 1
DECREASED APPETITE: 1
SLEEP DISTURBANCES DUE TO BREATHING: 0
INCREASED ENERGY: 1
EYE PAIN: 0
TREMORS: 0
ORTHOPNEA: 0
NECK PAIN: 1
NERVOUS/ANXIOUS: 1
MEMORY LOSS: 1
PALPITATIONS: 0
PARALYSIS: 0
BOWEL INCONTINENCE: 0
NUMBNESS: 1
DOUBLE VISION: 1
MYALGIAS: 1
EYE WATERING: 1
SYNCOPE: 0
POOR WOUND HEALING: 0
NAIL CHANGES: 0
STIFFNESS: 1
ABDOMINAL PAIN: 1
WEAKNESS: 1
HALLUCINATIONS: 0
HEMATURIA: 0
CHILLS: 0
HEARTBURN: 1
BLOOD IN STOOL: 0

## 2019-11-12 ENCOUNTER — OFFICE VISIT (OUTPATIENT)
Dept: NEUROLOGY | Facility: CLINIC | Age: 79
End: 2019-11-12
Payer: COMMERCIAL

## 2019-11-12 VITALS — HEART RATE: 67 BPM | SYSTOLIC BLOOD PRESSURE: 139 MMHG | OXYGEN SATURATION: 97 % | DIASTOLIC BLOOD PRESSURE: 66 MMHG

## 2019-11-12 DIAGNOSIS — G23.2 MULTIPLE SYSTEM ATROPHY P (H): Primary | ICD-10-CM

## 2019-11-12 DIAGNOSIS — M54.41 CHRONIC BILATERAL LOW BACK PAIN WITH BILATERAL SCIATICA: ICD-10-CM

## 2019-11-12 DIAGNOSIS — G89.29 CHRONIC BILATERAL LOW BACK PAIN WITH BILATERAL SCIATICA: ICD-10-CM

## 2019-11-12 DIAGNOSIS — M54.42 CHRONIC BILATERAL LOW BACK PAIN WITH BILATERAL SCIATICA: ICD-10-CM

## 2019-11-12 RX ORDER — HYDROCODONE BITARTRATE AND ACETAMINOPHEN 5; 325 MG/1; MG/1
TABLET ORAL
Qty: 100 TABLET | Refills: 0 | Status: SHIPPED | OUTPATIENT
Start: 2019-11-12 | End: 2019-12-13

## 2019-11-12 ASSESSMENT — UNIFIED PARKINSONS DISEASE RATING SCALE (UPDRS)
AMPLITUDE_LLE: NORMAL: NO TREMOR.
RIGIDITY_RUE: NORMAL
TOETAPPING_LEFT: SEVERE: CANNOT OR CAN ONLY BARELY PERFORM THE TASK BECAUSE OF SLOWING, INTERRUPTIONS, OR DECREMENTS.
AMPLITUDE_RUE: NORMAL: NO TREMOR.
AMPLITUDE_LUE: NORMAL: NO TREMOR.
ARISING_CHAIR: SEVERE: UNABLE TO ARISE WITHOUT HELP.
TOTAL_SCORE: 9
LEG_AGILITY_RIGHT: SLIGHT: ANY OF THE FOLLOWING: A) THE REGULAR RHYTHM IS BROKEN WITH ONE WITH ONE OR TWO INTERRUPTIONS OR HESITATIONS OF THE MOVEMENT B) SLIGHT SLOWING C) THE AMPLITUDE DECREMENTS NEAR THE END OF THE 10 MOVEMENTS.
FINGER_TAPPING_RIGHT: MILD: ANY OF THE FOLLOWING: A) 3 TO 5 INTERRUPTIONS DURING TAPPING B) MILD SLOWING C) THE AMPLITUDE DECREMENTS MIDWAY IN THE 10-MOVEMENT SEQUENCE
POSTURAL_STABILITY: SEVERE: VERY UNSTABLE, TENDS TO LOSE BALANCE SPONTANEOUSLY OR WITH JUST A GENTLE PULL ON THE SHOULDERS.
FINGER_TAPPING_LEFT: MILD: ANY OF THE FOLLOWING: A) 3 TO 5 INTERRUPTIONS DURING TAPPING B) MILD SLOWING C) THE AMPLITUDE DECREMENTS MIDWAY IN THE 10-MOVEMENT SEQUENCE
AMPLITUDE_LIP_JAW: NORMAL: NO TREMOR.
CONSTANCY_TREMOR_ATREST: NORMAL: NO TREMOR.
LEG_AGILITY_LEFT: SLIGHT: ANY OF THE FOLLOWING: A) THE REGULAR RHYTHM IS BROKEN WITH ONE WITH ONE OR TWO INTERRUPTIONS OR HESITATIONS OF THE MOVEMENT B) SLIGHT SLOWING C) THE AMPLITUDE DECREMENTS NEAR THE END OF THE 10 MOVEMENTS.
FREEZING_GAIT: NORMAL
GAIT: SEVERE: CANNOT WALK AT ALL OR ONLY WITH ANOTHER PERSON'S ASSISTANCE.
RIGIDITY_LLE: SLIGHT: RIGIDITY ONLY DETECTED WITH ACTIVATION MANEUVER.
AXIAL_SCORE: 18
HANDMOVEMENTS_RIGHT: MILD: ANY OF THE FOLLOWING: A) 3 TO 5 INTERRUPTIONS DURING TAPPING B) MILD SLOWING C) THE AMPLITUDE DECREMENTS MIDWAY IN THE 10-MOVEMENT SEQUENCE
PARKINSONS_MEDS: ON
RIGIDITY_RLE: SLIGHT: RIGIDITY ONLY DETECTED WITH ACTIVATION MANEUVER.
PRONATION_SUPINATION_LEFT: SLIGHT: ANY OF THE FOLLOWING: A) THE REGULAR RHYTHM IS BROKEN WITH ONE WITH ONE OR TWO INTERRUPTIONS OR HESITATIONS OF THE MOVEMENT B) SLIGHT SLOWING C) THE AMPLITUDE DECREMENTS NEAR THE END OF THE 10 MOVEMENTS.
TOTAL_SCORE_LEFT: 11
AMPLITUDE_RLE: NORMAL: NO TREMOR.
TOETAPPING_RIGHT: MILD: ANY OF THE FOLLOWING: A) 3 TO 5 INTERRUPTIONS DURING TAPPING B) MILD SLOWING C) THE AMPLITUDE DECREMENTS MIDWAY IN THE 10-MOVEMENT SEQUENCE
FACIAL_EXPRESSION: NORMAL.
PRONATION_SUPINATION_RIGHT: SLIGHT: ANY OF THE FOLLOWING: A) THE REGULAR RHYTHM IS BROKEN WITH ONE WITH ONE OR TWO INTERRUPTIONS OR HESITATIONS OF THE MOVEMENT B) SLIGHT SLOWING C) THE AMPLITUDE DECREMENTS NEAR THE END OF THE 10 MOVEMENTS.
HANDMOVEMENTS_LEFT: MILD: ANY OF THE FOLLOWING: A) 3 TO 5 INTERRUPTIONS DURING TAPPING B) MILD SLOWING C) THE AMPLITUDE DECREMENTS MIDWAY IN THE 10-MOVEMENT SEQUENCE
RIGIDITY_NECK: NORMAL
SPEECH: SLIGHT: LOSS OF MODULATION, DICTION OR VOLUME, BUT STILL ALL WORDS EASY TO UNDERSTAND.
TOTAL_SCORE: 38
POSTURE: 4 SEVERE. FLEXION, SCOLIOSIS, OR LEANING WITH EXTREME ABNORMALITY OF POSTURE.
RIGIDITY_LUE: NORMAL
SPONTANEITY_OF_MOVEMENT: 1: SLIGHT: SLIGHT GLOBAL SLOWNESS AND POVERTY OF SPONTANEOUS MOVEMENTS.

## 2019-11-12 ASSESSMENT — PAIN SCALES - GENERAL: PAINLEVEL: EXTREME PAIN (8)

## 2019-11-12 NOTE — PROGRESS NOTES
[facility] follow up note    Patient: Mehreen Camara  Medical Record Number: [id number]  Encounter Date: November 12, 2019  PCP:Cathy Sargent    CC: Multiple system atrophy    Impression:  1.  Multiple system atrophy-P  2.  No signs of significant progression  3.  Left Achilles rupture    Recommendations:  1.  Continue carbidopa/levodopa, 25/100, 2 tablets 3 times a day  2.  Check sitting and standing blood pressures at least weekly and record  3.  Attempt to find a form of exercise that she can do with the foot brace.  4.  Contact the clinical coordinator at the [facility] in [place] to see if the study of an experimental medicine for MSA-P would be something that interested the patient and  her family.    Return to clinic: 6 months    Interval Hx:: Ms. Mehreen Camara returns to clinic today for follow-up of her MSA-P.  She finds that this has not worsened greatly.  However she is found to have an Achilles tear on the left.  She had lots of pain and swelling in this area and eventually an MRI was done which showed the complete tear.  She is been in a foot brace.  This is a large and heavy brace and makes it hard for her to move.  She is not doing much at this time and she feels that she is wearing out and getting tired easily.  Is extremely hard to walk in the boot.  A new foot orthotic is being made.    We believe the diagnosis of MSA P is secure.  She had autonomic studies at [facility] which showed severe dysautonomia.  She has mild changes of T2 signal abnormalities in her [place].  She has not responded significantly to carbidopa levodopa.    She has had some neuropathic pain in her foot.  This is like an electric shock.  Her home physician is treating this with gabapentin which seems quite reasonable.    She is having a bit of loss of manual dexterity and has some difficulty T feeding herself.  Her speech is slurred at times.  Her swallowing is normal.  She fell only  once about 6 months ago.  She has bladder nocturia with occasional incontinence of small amounts of urine.  She gets dizzy easily but never faints.  She has bad stomach pain and continued bad constipation  Some difficulty using    Current medications    Movement Disorder-related Medications                    a.m.  noon  p.m.     Carbidopa/levodopa 25/100                                                2 2 2                                                                                 Current Outpatient Medications   Medication     acetaminophen (TYLENOL) 500 MG tablet     betamethasone dipropionate (DIPROSONE) 0.05 % external cream     bisacodyl (DULCOLAX) 10 MG suppository     calcium carbonate (TUMS) 500 MG chewable tablet     carbidopa-levodopa (SINEMET)  MG tablet     diclofenac (VOLTAREN) 1 % topical gel     docusate sodium (COLACE) 100 MG tablet     gabapentin (NEURONTIN) 100 MG capsule     hydrochlorothiazide (HYDRODIURIL) 25 MG tablet     HYDROcodone-acetaminophen (NORCO) 5-325 MG tablet     levothyroxine (SYNTHROID/LEVOTHROID) 137 MCG tablet     losartan (COZAAR) 50 MG tablet     melatonin 3 MG tablet     mineral oil enema     Multiple Vitamin (TAB-A-KATIA) TABS     omeprazole (PRILOSEC) 20 MG DR capsule     polyethylene glycol (MIRALAX/GLYCOLAX) packet     polyethylene glycol (MIRALAX/GLYCOLAX) packet     propylene glycol (SYSTANE BALANCE) 0.6 % SOLN ophthalmic solution     ranitidine (ZANTAC) 150 MG tablet     sertraline (ZOLOFT) 25 MG tablet     Simethicone (GAS RELIEF) 125 MG CAPS     triamcinolone (KENALOG) 0.1 % external cream     VITAMIN D3 1000 units tablet     White Petrolatum (VASELINE) ointment     acetaminophen (TYLENOL) 500 MG tablet     magnesium citrate solution     SM PAIN RELIEVER EX  MG tablet     Zinc Oxide (DESITIN) 13 % CREA     No current facility-administered medications for this visit.        Examination:  /66 (BP Location: Right arm, Patient Position: Sitting,  Cuff Size: Adult Regular)   Pulse 67   SpO2 97%   General- no distress, no rash, good pulses, no edema  Respiratory-auscultation over the anterior lung fields is clear  Cardiac- regular rate and rhythm    Neurologic  Mental status  Patient is alert, appropriate, speech is fluent and comprehension is intact    Cranial nerve testing  Pupils are equal and reactive to light, visual fields are full to confrontation bilaterally  Extraocular movements are full  Facial sensation is intact, face is symmetric with rest and activation  Palate rises symmetrically, tongue protrudes at midline with normal movements  Sterocleidomastoid and trapezius strength is normal and symmetric    Motor  UPDRS Values 11/12/2019   Time: 2:17 AM   Medication On   R Brain DBS: None   L Brain DBS: None   Speech 1   Facial Expression 0   Rigidity Neck 0   Rigidity RUE 0   Rigidity LUE 0   Rigidity RLE 1   Rigidity LLE 1   Finger Taps R 2   Finger Taps L 2   Hand Mvt R 2   Hand Mvt L 2   Pron-/Supinate R 1   Pron-/Supinate L 1   Toe Tap R 2   Toe Tap L 4   Leg Agility R 1   Leg Agility L 1   Arise From Chair 4   Gait 4   Gait Freezing 0   Postural Stability 4   Posture 4   Global Spont Mvt 1   Postural Tremor RUE 0   Postural Tremor LUE 0   Kinetic Tremor RUE 0   Kinetic Tremor LUE 0   Rest Tremor RUE 0   Rest Tremor LUE 0   Rest Tremor RLE 0   Rest Tremor LLE 0   Rest Tremor Lip/Jaw 0   Rest Tremor Constancy 0   Total Right 9   Total Left 11   Axial Total 18   Total 38   Caveat.  Patient cannot use left leg because foot is in boot.    Sensation  Intact to pin, vibration   Proprioception intact in great toes and fingers    Tendon reflexes  Testing at brachioradialis, biceps, triceps, patella and achilles bilaterally showed normal reflexes, symmetrically, without Babinski or Astorga signs    Coordination  Finger nose finger testing: normalRapid alternating movements are normal.  Gait and Station: normal    25 minutes of total time was spent  face-to-face with the patient over 50% of which was counseling..    Answers for HPI/ROS submitted by the patient on 11/11/2019   General Symptoms: Yes  Skin Symptoms: Yes  HENT Symptoms: No  EYE SYMPTOMS: Yes  HEART SYMPTOMS: Yes  LUNG SYMPTOMS: No  INTESTINAL SYMPTOMS: Yes  URINARY SYMPTOMS: Yes  GYNECOLOGIC SYMPTOMS: No  BREAST SYMPTOMS: No  SKELETAL SYMPTOMS: Yes  BLOOD SYMPTOMS: No  NERVOUS SYSTEM SYMPTOMS: Yes  MENTAL HEALTH SYMPTOMS: Yes  Fever: No  Loss of appetite: Yes  Weight loss: No  Weight gain: No  Fatigue: Yes  Night sweats: No  Chills: No  Increased stress: Yes  Excessive hunger: No  Excessive thirst: No  Feeling hot or cold when others believe the temperature is normal: No  Loss of height: No  Post-operative complications: No  Surgical site pain: No  Hallucinations: No  Change in or Loss of Energy: Yes  Hyperactivity: No  Confusion: Yes  Changes in hair: No  Changes in moles/birth marks: No  Itching: No  Rashes: No  Changes in nails: No  Acne: No  Hair in places you don't want it: No  Change in facial hair: No  Warts: No  Non-healing sores: No  Scarring: No  Flaking of skin: Yes  Color changes of hands/feet in cold : No  Sun sensitivity: No  Skin thickening: No  Eye pain: No  Vision loss: Yes  Dry eyes: Yes  Watery eyes: Yes  Eye bulging: No  Double vision: Yes  Flashing of lights: No  Spots: No  Floaters: Yes  Redness: No  Crossed eyes: No  Tunnel Vision: No  Yellowing of eyes: No  Eye irritation: No  Chest pain or pressure: No  Fast or irregular heartbeat: No  Pain in legs with walking: Yes  Trouble breathing while lying down: No  Fingers or toes appear blue: No  High blood pressure: Yes  Low blood pressure: Yes  Fainting: No  Murmurs: Yes  Pacemaker: No  Varicose veins: No  Edema or swelling: Yes  Wake up at night with shortness of breath: No  Light-headedness: Yes  Exercise intolerance: No  Heart burn or indigestion: Yes  Nausea: Yes  Vomiting: No  Abdominal pain: Yes  Bloating:  Yes  Constipation: Yes  Diarrhea: Yes  Blood in stool: No  Black stools: No  Rectal or Anal pain: No  Fecal incontinence: No  Yellowing of skin or eyes: No  Vomit with blood: No  Change in stools: No  Trouble holding urine or incontinence: Yes  Pain or burning: No  Trouble starting or stopping: No  Increased frequency of urination: No  Blood in urine: No  Decreased frequency of urination: No  Frequent nighttime urination: Yes  Flank pain: No  Difficulty emptying bladder: No  Back pain: Yes  Muscle aches: Yes  Neck pain: Yes  Swollen joints: No  Joint pain: Yes  Bone pain: Yes  Muscle cramps: Yes  Muscle weakness: Yes  Joint stiffness: Yes  Bone fracture: No  Trouble with coordination: Yes  Dizziness or trouble with balance: Yes  Fainting or black-out spells: No  Memory loss: Yes  Headache: No  Seizures: No  Speech problems: No  Tingling: No  Tremor: No  Weakness: Yes  Difficulty walking: Yes  Paralysis: No  Numbness: Yes  Nervous or Anxious: Yes  Depression: Yes  Trouble sleeping: Yes  Trouble thinking or concentrating: Yes

## 2019-11-12 NOTE — PATIENT INSTRUCTIONS
1.  Continue carbidopa/levodopa 25/100 two tablets three times a day    2.  Check sitting and standing blood pressures weekly and record    3.  Try to some form of exercise    4.  Contact [place] to see if you are interested in medication trial for MSA

## 2019-11-12 NOTE — LETTER
11/12/2019       RE: Mehreen Camara  [facility]  [address]  [facility] [address]     Dear Colleague,    Thank you for referring your patient, Mehreen Camara, to the [facility] NEUROLOGY at [facility]. Please see a copy of my visit note below.    Move[facility] follow up note    Patient: Mehreen Camara  Medical Record Number: [id number]  Encounter Date: November 12, 2019  PCP:Cathy Sargent    CC: Multiple system atrophy    Impression:  1.  Multiple system atrophy-P  2.  No signs of significant progression  3.  Left Achilles rupture    Recommendations:  1.  Continue carbidopa/levodopa, 25/100, 2 tablets 3 times a day  2.  Check sitting and standing blood pressures at least weekly and record  3.  Attempt to find a form of exercise that she can do with the foot brace.  4.  Contact the clinical coordinator at the [facility] in [place] to see if the study of an experimental medicine for MSA-P would be something that interested the patient and  her family.    Return to clinic: 6 months    Interval Hx:: Ms. Mehreen Camara returns to clinic today for follow-up of her MSA-P.  She finds that this has not worsened greatly.  However she is found to have an Achilles tear on the left.  She had lots of pain and swelling in this area and eventually an MRI was done which showed the complete tear.  She is been in a foot brace.  This is a large and heavy brace and makes it hard for her to move.  She is not doing much at this time and she feels that she is wearing out and getting tired easily.  Is extremely hard to walk in the boot.  A new foot orthotic is being made.    We believe the diagnosis of MSA P is secure.  She had autonomic studies at [facility] which showed severe dysautonomia.  She has mild changes of T2 signal abnormalities in her [place].  She has not responded significantly to carbidopa levodopa.    She has had some  neuropathic pain in her foot.  This is like an electric shock.  Her home physician is treating this with gabapentin which seems quite reasonable.    She is having a bit of loss of manual dexterity and has some difficulty T feeding herself.  Her speech is slurred at times.  Her swallowing is normal.  She fell only once about 6 months ago.  She has bladder nocturia with occasional incontinence of small amounts of urine.  She gets dizzy easily but never faints.  She has bad stomach pain and continued bad constipation  Some difficulty using    Current medications    Movement Disorder-related Medications                    a.m.  noon  p.m.     Carbidopa/levodopa 25/100                                                2 2 2                                                                                 Current Outpatient Medications   Medication     acetaminophen (TYLENOL) 500 MG tablet     betamethasone dipropionate (DIPROSONE) 0.05 % external cream     bisacodyl (DULCOLAX) 10 MG suppository     calcium carbonate (TUMS) 500 MG chewable tablet     carbidopa-levodopa (SINEMET)  MG tablet     diclofenac (VOLTAREN) 1 % topical gel     docusate sodium (COLACE) 100 MG tablet     gabapentin (NEURONTIN) 100 MG capsule     hydrochlorothiazide (HYDRODIURIL) 25 MG tablet     HYDROcodone-acetaminophen (NORCO) 5-325 MG tablet     levothyroxine (SYNTHROID/LEVOTHROID) 137 MCG tablet     losartan (COZAAR) 50 MG tablet     melatonin 3 MG tablet     mineral oil enema     Multiple Vitamin (TAB-A-KATIA) TABS     omeprazole (PRILOSEC) 20 MG DR capsule     polyethylene glycol (MIRALAX/GLYCOLAX) packet     polyethylene glycol (MIRALAX/GLYCOLAX) packet     propylene glycol (SYSTANE BALANCE) 0.6 % SOLN ophthalmic solution     ranitidine (ZANTAC) 150 MG tablet     sertraline (ZOLOFT) 25 MG tablet     Simethicone (GAS RELIEF) 125 MG CAPS     triamcinolone (KENALOG) 0.1 % external cream     VITAMIN D3 1000 units tablet     White Petrolatum  (VASELINE) ointment     acetaminophen (TYLENOL) 500 MG tablet     magnesium citrate solution     SM PAIN RELIEVER EX  MG tablet     Zinc Oxide (DESITIN) 13 % CREA     No current facility-administered medications for this visit.        Examination:  /66 (BP Location: Right arm, Patient Position: Sitting, Cuff Size: Adult Regular)   Pulse 67   SpO2 97%   General- no distress, no rash, good pulses, no edema  Respiratory-auscultation over the anterior lung fields is clear  Cardiac- regular rate and rhythm    Neurologic  Mental status  Patient is alert, appropriate, speech is fluent and comprehension is intact    Cranial nerve testing  Pupils are equal and reactive to light, visual fields are full to confrontation bilaterally  Extraocular movements are full  Facial sensation is intact, face is symmetric with rest and activation  Palate rises symmetrically, tongue protrudes at midline with normal movements  Sterocleidomastoid and trapezius strength is normal and symmetric    Motor  UPDRS Values 11/12/2019   Time: 2:17 AM   Medication On   R Brain DBS: None   L Brain DBS: None   Speech 1   Facial Expression 0   Rigidity Neck 0   Rigidity RUE 0   Rigidity LUE 0   Rigidity RLE 1   Rigidity LLE 1   Finger Taps R 2   Finger Taps L 2   Hand Mvt R 2   Hand Mvt L 2   Pron-/Supinate R 1   Pron-/Supinate L 1   Toe Tap R 2   Toe Tap L 4   Leg Agility R 1   Leg Agility L 1   Arise From Chair 4   Gait 4   Gait Freezing 0   Postural Stability 4   Posture 4   Global Spont Mvt 1   Postural Tremor RUE 0   Postural Tremor LUE 0   Kinetic Tremor RUE 0   Kinetic Tremor LUE 0   Rest Tremor RUE 0   Rest Tremor LUE 0   Rest Tremor RLE 0   Rest Tremor LLE 0   Rest Tremor Lip/Jaw 0   Rest Tremor Constancy 0   Total Right 9   Total Left 11   Axial Total 18   Total 38   Caveat.  Patient cannot use left leg because foot is in boot.    Sensation  Intact to pin, vibration   Proprioception intact in great toes and fingers    Tendon  reflexes  Testing at brachioradialis, biceps, triceps, patella and achilles bilaterally showed normal reflexes, symmetrically, without Babinski or Astorga signs    Coordination  Finger nose finger testing: normalRapid alternating movements are normal.  Gait and Station: normal    25 minutes of total time was spent face-to-face with the patient over 50% of which was counseling..    Again, thank you for allowing me to participate in the care of your patient.      Sincerely,    Chris Monterroso MD

## 2019-11-12 NOTE — NURSING NOTE
Chief Complaint   Patient presents with     RECHECK     [facility] RETURN - 6 MONTH FOLLOW UP       Meliton Zheng, EMT

## 2019-11-13 ENCOUNTER — DOCUMENTATION ONLY (OUTPATIENT)
Dept: NEUROLOGY | Facility: CLINIC | Age: 79
End: 2019-11-13

## 2019-11-13 ENCOUNTER — MEDICAL CORRESPONDENCE (OUTPATIENT)
Dept: HEALTH INFORMATION MANAGEMENT | Facility: CLINIC | Age: 79
End: 2019-11-13

## 2019-11-13 NOTE — PROGRESS NOTES
Received form from [facility], form was placed in provider folder for signature    Received forms back signed by provider, forms were fax to [phone], sent to be scanned

## 2019-11-22 ENCOUNTER — MEDICAL CORRESPONDENCE (OUTPATIENT)
Dept: HEALTH INFORMATION MANAGEMENT | Facility: CLINIC | Age: 79
End: 2019-11-22

## 2019-11-22 ENCOUNTER — TELEPHONE (OUTPATIENT)
Dept: NEUROLOGY | Facility: CLINIC | Age: 79
End: 2019-11-22

## 2019-11-22 NOTE — TELEPHONE ENCOUNTER
Received BP record form [facility]  Records Date of service: 11/22/19  Copy has been sent to scanning and encounter routed to specialty nurse for review. Records was placed in provider folder FYI

## 2019-11-25 ENCOUNTER — OFFICE VISIT - HEALTHEAST (OUTPATIENT)
Dept: CARDIOLOGY | Facility: CLINIC | Age: 79
End: 2019-11-25

## 2019-11-25 DIAGNOSIS — I95.1 ORTHOSTATIC HYPOTENSION DYSAUTONOMIC SYNDROME: ICD-10-CM

## 2019-11-25 DIAGNOSIS — I71.40 ANEURYSM OF ABDOMINAL VESSEL (H): ICD-10-CM

## 2019-11-25 ASSESSMENT — MIFFLIN-ST. JEOR: SCORE: 1201.84

## 2019-11-26 ENCOUNTER — RECORDS - HEALTHEAST (OUTPATIENT)
Dept: ADMINISTRATIVE | Facility: OTHER | Age: 79
End: 2019-11-26

## 2019-12-06 ENCOUNTER — MEDICAL CORRESPONDENCE (OUTPATIENT)
Dept: HEALTH INFORMATION MANAGEMENT | Facility: CLINIC | Age: 79
End: 2019-12-06

## 2019-12-13 ENCOUNTER — DOCUMENTATION ONLY (OUTPATIENT)
Dept: NEUROLOGY | Facility: CLINIC | Age: 79
End: 2019-12-13

## 2019-12-13 ENCOUNTER — MEDICAL CORRESPONDENCE (OUTPATIENT)
Dept: HEALTH INFORMATION MANAGEMENT | Facility: CLINIC | Age: 79
End: 2019-12-13

## 2019-12-13 DIAGNOSIS — G89.29 CHRONIC BILATERAL LOW BACK PAIN WITH BILATERAL SCIATICA: ICD-10-CM

## 2019-12-13 DIAGNOSIS — M54.42 CHRONIC BILATERAL LOW BACK PAIN WITH BILATERAL SCIATICA: ICD-10-CM

## 2019-12-13 DIAGNOSIS — M54.41 CHRONIC BILATERAL LOW BACK PAIN WITH BILATERAL SCIATICA: ICD-10-CM

## 2019-12-14 RX ORDER — HYDROCODONE BITARTRATE AND ACETAMINOPHEN 5; 325 MG/1; MG/1
TABLET ORAL
Qty: 100 TABLET | Refills: 0 | Status: SHIPPED | OUTPATIENT
Start: 2019-12-14 | End: 2020-01-15

## 2019-12-20 ENCOUNTER — MEDICAL CORRESPONDENCE (OUTPATIENT)
Dept: HEALTH INFORMATION MANAGEMENT | Facility: CLINIC | Age: 79
End: 2019-12-20

## 2019-12-23 ENCOUNTER — TELEPHONE (OUTPATIENT)
Dept: NEUROLOGY | Facility: CLINIC | Age: 79
End: 2019-12-23

## 2019-12-23 NOTE — TELEPHONE ENCOUNTER
Received BP records from [place]  Records Date of service: 12/20/19  Copy has been sent to scanning and encounter routed to specialty nurse for review. FYI was placed in provider folder

## 2019-12-27 ENCOUNTER — TELEPHONE (OUTPATIENT)
Dept: NEUROLOGY | Facility: CLINIC | Age: 79
End: 2019-12-27

## 2019-12-28 ENCOUNTER — APPOINTMENT (OUTPATIENT)
Dept: CT IMAGING | Facility: CLINIC | Age: 79
End: 2019-12-28
Attending: FAMILY MEDICINE
Payer: COMMERCIAL

## 2019-12-28 ENCOUNTER — HOSPITAL ENCOUNTER (EMERGENCY)
Facility: CLINIC | Age: 79
Discharge: HOME | End: 2019-12-28
Attending: FAMILY MEDICINE | Admitting: FAMILY MEDICINE
Payer: COMMERCIAL

## 2019-12-28 VITALS
TEMPERATURE: 98 F | SYSTOLIC BLOOD PRESSURE: 189 MMHG | OXYGEN SATURATION: 94 % | DIASTOLIC BLOOD PRESSURE: 92 MMHG | HEART RATE: 86 BPM | WEIGHT: 150 LBS | BODY MASS INDEX: 24.11 KG/M2 | HEIGHT: 66 IN

## 2019-12-28 DIAGNOSIS — R10.84 ABDOMINAL PAIN, GENERALIZED: ICD-10-CM

## 2019-12-28 DIAGNOSIS — G62.9 NEUROPATHY: ICD-10-CM

## 2019-12-28 LAB
ALBUMIN SERPL-MCNC: 3.6 G/DL (ref 3.4–5)
ALBUMIN UR-MCNC: NEGATIVE MG/DL
ALP SERPL-CCNC: 64 U/L (ref 40–150)
ALT SERPL W P-5'-P-CCNC: 21 U/L (ref 0–50)
AMORPH CRY #/AREA URNS HPF: ABNORMAL /HPF
ANION GAP SERPL CALCULATED.3IONS-SCNC: 3 MMOL/L (ref 3–14)
APPEARANCE UR: ABNORMAL
AST SERPL W P-5'-P-CCNC: 24 U/L (ref 0–45)
BACTERIA #/AREA URNS HPF: ABNORMAL /HPF
BASOPHILS # BLD AUTO: 0 10E9/L (ref 0–0.2)
BASOPHILS NFR BLD AUTO: 0.5 %
BILIRUB SERPL-MCNC: 0.7 MG/DL (ref 0.2–1.3)
BILIRUB UR QL STRIP: NEGATIVE
BUN SERPL-MCNC: 10 MG/DL (ref 7–30)
CALCIUM SERPL-MCNC: 9.4 MG/DL (ref 8.5–10.1)
CHLORIDE SERPL-SCNC: 102 MMOL/L (ref 94–109)
CO2 SERPL-SCNC: 33 MMOL/L (ref 20–32)
COLOR UR AUTO: ABNORMAL
CREAT SERPL-MCNC: 0.39 MG/DL (ref 0.52–1.04)
DIFFERENTIAL METHOD BLD: NORMAL
EOSINOPHIL # BLD AUTO: 0 10E9/L (ref 0–0.7)
EOSINOPHIL NFR BLD AUTO: 0.5 %
ERYTHROCYTE [DISTWIDTH] IN BLOOD BY AUTOMATED COUNT: 11.5 % (ref 10–15)
GFR SERPL CREATININE-BSD FRML MDRD: >90 ML/MIN/{1.73_M2}
GLUCOSE SERPL-MCNC: 96 MG/DL (ref 70–99)
GLUCOSE UR STRIP-MCNC: NEGATIVE MG/DL
HCT VFR BLD AUTO: 40.2 % (ref 35–47)
HGB BLD-MCNC: 13.2 G/DL (ref 11.7–15.7)
HGB UR QL STRIP: NEGATIVE
HYALINE CASTS #/AREA URNS LPF: 1 /LPF (ref 0–2)
IMM GRANULOCYTES # BLD: 0 10E9/L (ref 0–0.4)
IMM GRANULOCYTES NFR BLD: 0.2 %
KETONES UR STRIP-MCNC: NEGATIVE MG/DL
LACTATE BLD-SCNC: 0.8 MMOL/L (ref 0.7–2)
LEUKOCYTE ESTERASE UR QL STRIP: NEGATIVE
LIPASE SERPL-CCNC: 90 U/L (ref 73–393)
LYMPHOCYTES # BLD AUTO: 0.9 10E9/L (ref 0.8–5.3)
LYMPHOCYTES NFR BLD AUTO: 17 %
MCH RBC QN AUTO: 32 PG (ref 26.5–33)
MCHC RBC AUTO-ENTMCNC: 32.8 G/DL (ref 31.5–36.5)
MCV RBC AUTO: 97 FL (ref 78–100)
MONOCYTES # BLD AUTO: 0.4 10E9/L (ref 0–1.3)
MONOCYTES NFR BLD AUTO: 7.8 %
MUCOUS THREADS #/AREA URNS LPF: PRESENT /LPF
NEUTROPHILS # BLD AUTO: 4.1 10E9/L (ref 1.6–8.3)
NEUTROPHILS NFR BLD AUTO: 74 %
NITRATE UR QL: NEGATIVE
NRBC # BLD AUTO: 0 10*3/UL
NRBC BLD AUTO-RTO: 0 /100
PH UR STRIP: 8 PH (ref 5–7)
PLATELET # BLD AUTO: 207 10E9/L (ref 150–450)
POTASSIUM SERPL-SCNC: 3.6 MMOL/L (ref 3.4–5.3)
PROT SERPL-MCNC: 7.4 G/DL (ref 6.8–8.8)
RBC # BLD AUTO: 4.13 10E12/L (ref 3.8–5.2)
RBC #/AREA URNS AUTO: 1 /HPF (ref 0–2)
SODIUM SERPL-SCNC: 138 MMOL/L (ref 133–144)
SOURCE: ABNORMAL
SP GR UR STRIP: 1.02 (ref 1–1.03)
SQUAMOUS #/AREA URNS AUTO: 2 /HPF (ref 0–1)
UROBILINOGEN UR STRIP-MCNC: 0 MG/DL (ref 0–2)
WBC # BLD AUTO: 5.5 10E9/L (ref 4–11)
WBC #/AREA URNS AUTO: 3 /HPF (ref 0–5)

## 2019-12-28 PROCEDURE — 99284 EMERGENCY DEPT VISIT MOD MDM: CPT | Mod: 25

## 2019-12-28 PROCEDURE — 96374 THER/PROPH/DIAG INJ IV PUSH: CPT

## 2019-12-28 PROCEDURE — 25000125 ZZHC RX 250: Performed by: FAMILY MEDICINE

## 2019-12-28 PROCEDURE — 83690 ASSAY OF LIPASE: CPT | Performed by: FAMILY MEDICINE

## 2019-12-28 PROCEDURE — 83605 ASSAY OF LACTIC ACID: CPT | Performed by: FAMILY MEDICINE

## 2019-12-28 PROCEDURE — 25000132 ZZH RX MED GY IP 250 OP 250 PS 637: Performed by: FAMILY MEDICINE

## 2019-12-28 PROCEDURE — 96376 TX/PRO/DX INJ SAME DRUG ADON: CPT

## 2019-12-28 PROCEDURE — 81001 URINALYSIS AUTO W/SCOPE: CPT | Performed by: FAMILY MEDICINE

## 2019-12-28 PROCEDURE — 80053 COMPREHEN METABOLIC PANEL: CPT | Performed by: FAMILY MEDICINE

## 2019-12-28 PROCEDURE — 99284 EMERGENCY DEPT VISIT MOD MDM: CPT | Mod: Z6 | Performed by: FAMILY MEDICINE

## 2019-12-28 PROCEDURE — 25000128 H RX IP 250 OP 636: Performed by: FAMILY MEDICINE

## 2019-12-28 PROCEDURE — 74177 CT ABD & PELVIS W/CONTRAST: CPT

## 2019-12-28 PROCEDURE — 85025 COMPLETE CBC W/AUTO DIFF WBC: CPT | Performed by: FAMILY MEDICINE

## 2019-12-28 RX ORDER — MAGNESIUM CARB/ALUMINUM HYDROX 105-160MG
296 TABLET,CHEWABLE ORAL ONCE
Qty: 296 ML | Refills: 0 | Status: SHIPPED | OUTPATIENT
Start: 2019-12-28 | End: 2019-12-31

## 2019-12-28 RX ORDER — OXYCODONE HYDROCHLORIDE 5 MG/1
5 TABLET ORAL EVERY 4 HOURS PRN
Status: DISCONTINUED | OUTPATIENT
Start: 2019-12-28 | End: 2019-12-28 | Stop reason: HOSPADM

## 2019-12-28 RX ORDER — ONDANSETRON 2 MG/ML
4 INJECTION INTRAMUSCULAR; INTRAVENOUS EVERY 30 MIN PRN
Status: DISCONTINUED | OUTPATIENT
Start: 2019-12-28 | End: 2019-12-28 | Stop reason: HOSPADM

## 2019-12-28 RX ORDER — HYDROMORPHONE HYDROCHLORIDE 1 MG/ML
0.5 INJECTION, SOLUTION INTRAMUSCULAR; INTRAVENOUS; SUBCUTANEOUS
Status: DISCONTINUED | OUTPATIENT
Start: 2019-12-28 | End: 2019-12-28 | Stop reason: HOSPADM

## 2019-12-28 RX ORDER — IOPAMIDOL 755 MG/ML
74 INJECTION, SOLUTION INTRAVASCULAR ONCE
Status: COMPLETED | OUTPATIENT
Start: 2019-12-28 | End: 2019-12-28

## 2019-12-28 RX ORDER — HYDROMORPHONE HYDROCHLORIDE 1 MG/ML
0.2 INJECTION, SOLUTION INTRAMUSCULAR; INTRAVENOUS; SUBCUTANEOUS
Status: COMPLETED | OUTPATIENT
Start: 2019-12-28 | End: 2019-12-28

## 2019-12-28 RX ORDER — SODIUM CHLORIDE 9 MG/ML
INJECTION, SOLUTION INTRAVENOUS CONTINUOUS
Status: DISCONTINUED | OUTPATIENT
Start: 2019-12-28 | End: 2019-12-28 | Stop reason: HOSPADM

## 2019-12-28 RX ADMIN — HYDROMORPHONE HYDROCHLORIDE 0.5 MG: 1 INJECTION, SOLUTION INTRAMUSCULAR; INTRAVENOUS; SUBCUTANEOUS at 15:38

## 2019-12-28 RX ADMIN — OXYCODONE HYDROCHLORIDE 5 MG: 5 TABLET ORAL at 13:35

## 2019-12-28 RX ADMIN — HYDROMORPHONE HYDROCHLORIDE 0.5 MG: 1 INJECTION, SOLUTION INTRAMUSCULAR; INTRAVENOUS; SUBCUTANEOUS at 14:51

## 2019-12-28 RX ADMIN — IOPAMIDOL 74 ML: 755 INJECTION, SOLUTION INTRAVENOUS at 16:05

## 2019-12-28 RX ADMIN — SODIUM CHLORIDE 58 ML: 9 INJECTION, SOLUTION INTRAVENOUS at 16:06

## 2019-12-28 RX ADMIN — HYDROMORPHONE HYDROCHLORIDE 0.2 MG: 1 INJECTION, SOLUTION INTRAMUSCULAR; INTRAVENOUS; SUBCUTANEOUS at 13:36

## 2019-12-28 ASSESSMENT — MIFFLIN-ST. JEOR: SCORE: 1172.15

## 2019-12-28 ASSESSMENT — ENCOUNTER SYMPTOMS
SHORTNESS OF BREATH: 0
CHILLS: 0
NAUSEA: 1
BLOOD IN STOOL: 0
SORE THROAT: 0
FREQUENCY: 0
DIARRHEA: 0
PALPITATIONS: 0
DYSURIA: 0
CONSTIPATION: 0
SINUS PRESSURE: 0
COUGH: 0
ABDOMINAL PAIN: 1
HEADACHES: 0
FEVER: 0
WHEEZING: 0
VOMITING: 1
DIAPHORESIS: 0

## 2019-12-28 NOTE — ED NOTES
Rounded on pt, she is weepy and states she is in pain. Comfort and reassurance given.  Bundled in warm blankets and repositioned.    PLAN:  Will update MD and await CT results.

## 2019-12-28 NOTE — ED NOTES
MD in with pt and son discussing all tests results and plan.  Hot pack provided for comfort.  PLAN: Will await further orders and disposition.

## 2019-12-28 NOTE — ED NOTES
Patient states she has right sided abdominal pain. She has had this pain in the past and has had constipation but she states this feels different. She states they have been messing around with her gabapentin dose and she wonders if that may be the reason for the pain.

## 2019-12-28 NOTE — DISCHARGE INSTRUCTIONS
ICD-10-CM    1. Neuropathy G62.9     suspect the neuropathy is alos contirbuting. stay on gabapentin   2. Abdominal pain, generalized R10.84     No serious findings.  chronic changes seen such as biliary tract dilatation and adrenal mass (likely adenoma).  Use fleets enema.  Then magnesium citrate 300 ml bottle,.  Then miralax 1 capful daily for 7 days, maintain hydration.  when better start fiber supplement such as metamuci or citrucil.  return for fever, worsening,

## 2019-12-28 NOTE — ED AVS SNAPSHOT
[facility] Emergency Department  [address] [address]  Phone:  [phone]  Fax:  [phone]                                    Mehreen Camara   MRN: [id number]    Department:  [facility] Department   Date of Visit:  12/28/2019           After Visit Summary Signature Page    I have received my discharge instructions, and my questions have been answered. I have discussed any challenges I see with this plan with the nurse or doctor.    ..........................................................................................................................................  Patient/Patient Representative Signature      ..........................................................................................................................................  Patient Representative Print Name and Relationship to Patient    ..................................................               ................................................  Date                                   Time    ..........................................................................................................................................  Reviewed by Signature/Title    ...................................................              ..............................................  Date                                               Time          22EPIC Rev 08/18

## 2019-12-28 NOTE — ED PROVIDER NOTES
History     Chief Complaint   Patient presents with     Abdominal Pain     rlq pain for past 3-4 days     HPI  Mehreen Camara is a 79 year old female who Zentz with a history of multiple system atrophy chronic neuropathy of the lower extremities and the right lower abdomen on gabapentin for this, also with orthostatic hypotension dysautonomic syndrome, AAA status post bifurcation graft, rheumatic aortic stenosis, status post laparoscopic cholecystectomy and hysterectomy and still has ovaries presenting with 3 days of abdominal pain right lower quadrant without radiation and associated with nausea with minimal vomiting of a few pill fragments but no other episodes. pain is exacerbated by movement and possibly by food intake.   No diarrhea.  No constipation.  No urinary tract changes.  No dysuria urgency frequency or hematuria.  No rash.  No fever.  Recently she had had her gabapentin discontinued because of a sense of dizziness.  That was stopped for several days and was just restarted in the last day.      Allergies:  Allergies   Allergen Reactions     Penicillins Anaphylaxis, Rash and Shortness Of Breath     Aspirin Nausea     Atenolol Cough     Atorvastatin Muscle Pain (Myalgia)     Bupropion Nausea     Clindamycin Other (See Comments)     Constipation      Codeine Nausea     Ibuprofen Nausea     Stomach upset     Lovastatin Muscle Pain (Myalgia)     Cephalexin Rash     Neck reddness       Problem List:    Patient Active Problem List    Diagnosis Date Noted     Flatulence, eructation, and gas pain 08/28/2019     Priority: Medium     Post-op pain 12/12/2018     Priority: Medium     S/P laparoscopic cholecystectomy 12/12/2018     Priority: Medium     Multiple system atrophy (H) 11/14/2018     Priority: Medium     Movement disorder 09/19/2018     Priority: Medium     Rheumatic aortic stenosis 09/19/2018     Priority: Medium     Disorder of bursae and tendons in shoulder region 09/18/2018     Priority: Medium      Overview:   Created by Conversion    Replacement Utility updated for latest IMO load       Adjustment disorder with anxious mood 09/17/2018     Priority: Medium     Adjustment disorder with mixed anxiety and depressed mood 09/17/2018     Priority: Medium     Aneurysm of abdominal vessel (H) 09/17/2018     Priority: Medium     Overview:   Created by Conversion  [facility] Annotation: Jun 8 2011  8:07AM - Mirna Ortizica: 4.4 on 11/2013.    Needs 6-12 month u/s or CT.    Replacement Utility updated for latest IMO load       Harirs's esophagus 09/17/2018     Priority: Medium     Overview:   Created by Conversion  [facility] Annotation: Feb  3 2012  8:32AM - Cameron Obando: Needs EGD 2/2017       Bradycardia 09/17/2018     Priority: Medium     Cataract 09/17/2018     Priority: Medium     Overview:   Created by Conversion       Major depressive disorder, recurrent episode (H) 09/17/2018     Priority: Medium     Overview:   Created by Conversion    Replacement Utility updated for latest IMO load       Constipation 09/17/2018     Priority: Medium     Dizziness 09/17/2018     Priority: Medium     Overview:   Created by Conversion       Dyslipidemia 09/17/2018     Priority: Medium     Overview:   Created by Conversion       Esophageal reflux 09/17/2018     Priority: Medium     Overview:   Created by Conversion       Hypertension 09/17/2018     Priority: Medium     Overview:   Created by Conversion    Replacement Utility updated for latest IMO load       Hypothyroidism 09/17/2018     Priority: Medium     Overview:   Created by Conversion    Replacement Utility updated for latest IMO load       Insomnia due to anxiety and fear 09/17/2018     Priority: Medium     Lower back pain 09/17/2018     Priority: Medium     Osteoarthrosis 09/17/2018     Priority: Medium     Overview:   Created by Conversion    Replacement Utility updated for latest IMO load       Disorder of bone and cartilage 09/17/2018     Priority: Medium      Overview:   Created by [facility] Annotation: Dec 13 2012  7:41Roberta Kelly: vit D/Ca, f/u   Dexa needed 2014.    Replacement Utility updated for latest IMO load       Lower extremity weakness 2018     Priority: Medium     Orthostatic hypotension dysautonomic syndrome (H) 2017     Priority: Medium     Primary insomnia 2017     Priority: Medium     Urinary incontinence in female 2017     Priority: Medium     Weakness 2017     Priority: Medium     S/P AAA repair using bifurcation graft 2015     Priority: Medium     Adrenal adenoma 2015     Priority: Medium     Overview:   Current hormonal evaluation through endocrinology          Past Medical History:    Past Medical History:   Diagnosis Date     Adrenal adenoma      Arthritis      Depressive disorder      Hypertension      Rheumatic fever      Small bowel obstruction (H)      Thyroid disease        Past Surgical History:    Past Surgical History:   Procedure Laterality Date     AAA REPAIR      [facility] bifurcation graft     CARPAL TUNNEL RELEASE RT/LT Bilateral 2013     HYSTERECTOMY  2013     JOINT REPLACEMENT Right 2003     LAPAROSCOPIC CHOLECYSTECTOMY N/A 2018    Procedure: LAPAROSCOPIC CHOLECYSTECTOMY;  Surgeon: Amadeo Sebastian MD;  Location: [facility] OR     ORTHOPEDIC SURGERY       SPINE SURGERY      cervical spine fusion     THYROIDECTOMY         Family History:    Family History   Problem Relation Age of Onset     Hypertension Mother      Coronary Artery Disease Mother      Coronary Artery Disease Father      Other Cancer Brother      Parkinsonism Other        Social History:  Marital Status:   [5]  Social History     Tobacco Use     Smoking status: Former Smoker     Packs/day: 0.50     Types: Cigarettes     Last attempt to quit: 1994     Years since quittin.0     Smokeless tobacco: Never Used   Substance Use Topics     Alcohol use: No     Drug use: No        Medications:  "   acetaminophen (TYLENOL) 500 MG tablet  acetaminophen (TYLENOL) 500 MG tablet  betamethasone dipropionate (DIPROSONE) 0.05 % external cream  bisacodyl (DULCOLAX) 10 MG suppository  calcium carbonate (TUMS) 500 MG chewable tablet  carbidopa-levodopa (SINEMET)  MG tablet  diclofenac (VOLTAREN) 1 % topical gel  docusate sodium (COLACE) 100 MG tablet  gabapentin (NEURONTIN) 100 MG capsule  hydrochlorothiazide (HYDRODIURIL) 25 MG tablet  HYDROcodone-acetaminophen (NORCO) 5-325 MG tablet  levothyroxine (SYNTHROID/LEVOTHROID) 137 MCG tablet  losartan (COZAAR) 50 MG tablet  magnesium citrate solution  melatonin 3 MG tablet  mineral oil enema  Multiple Vitamin (TAB-A-KATIA) TABS  omeprazole (PRILOSEC) 20 MG DR capsule  polyethylene glycol (MIRALAX/GLYCOLAX) packet  polyethylene glycol (MIRALAX/GLYCOLAX) packet  propylene glycol (SYSTANE BALANCE) 0.6 % SOLN ophthalmic solution  ranitidine (ZANTAC) 150 MG tablet  sertraline (ZOLOFT) 25 MG tablet  Simethicone (GAS RELIEF) 125 MG CAPS  SM PAIN RELIEVER EX  MG tablet  triamcinolone (KENALOG) 0.1 % external cream  VITAMIN D3 1000 units tablet  White Petrolatum (VASELINE) ointment  Zinc Oxide (DESITIN) 13 % CREA          Review of Systems   Constitutional: Negative for chills, diaphoresis and fever.   HENT: Negative for ear pain, sinus pressure and sore throat.    Eyes: Negative for visual disturbance.   Respiratory: Negative for cough, shortness of breath and wheezing.    Cardiovascular: Negative for chest pain and palpitations.   Gastrointestinal: Positive for abdominal pain, nausea and vomiting. Negative for blood in stool, constipation and diarrhea.   Genitourinary: Negative for dysuria, frequency and urgency.   Skin: Negative for rash.   Neurological: Negative for headaches.   All other systems reviewed and are negative.      Physical Exam   BP: 113/74  Pulse: 97  Temp: 98  F (36.7  C)  Height: 167.6 cm (5' 6\")  Weight: 68 kg (150 lb)  SpO2: 96 %      Physical " Exam  Constitutional:       General: She is in acute distress.      Appearance: She is not toxic-appearing.   HENT:      Head: Atraumatic.      Nose: Nose normal.      Mouth/Throat:      Mouth: Mucous membranes are moist.   Eyes:      Conjunctiva/sclera: Conjunctivae normal.   Neck:      Musculoskeletal: Neck supple.   Cardiovascular:      Rate and Rhythm: Normal rate and regular rhythm.      Heart sounds: Murmur (LSB 3+) present.   Pulmonary:      Effort: Pulmonary effort is normal. No respiratory distress.      Breath sounds: Normal breath sounds. No stridor. No wheezing or rhonchi.   Abdominal:      General: Abdomen is flat. Bowel sounds are normal. There is no distension.      Palpations: There is no mass.      Tenderness: There is abdominal tenderness. There is right CVA tenderness, left CVA tenderness and guarding. There is no rebound.      Hernia: No hernia is present.   Musculoskeletal:      Right lower leg: No edema.      Left lower leg: No edema.   Skin:     Findings: No rash.   Neurological:      General: No focal deficit present.      Mental Status: She is alert.         ED Course        Procedures               Critical Care time:  none               Results for orders placed or performed during the hospital encounter of 12/28/19 (from the past 24 hour(s))   UA with Microscopic   Result Value Ref Range    Color Urine Charleen     Appearance Urine Cloudy     Glucose Urine Negative NEG^Negative mg/dL    Bilirubin Urine Negative NEG^Negative    Ketones Urine Negative NEG^Negative mg/dL    Specific Gravity Urine 1.016 1.003 - 1.035    Blood Urine Negative NEG^Negative    pH Urine 8.0 (H) 5.0 - 7.0 pH    Protein Albumin Urine Negative NEG^Negative mg/dL    Urobilinogen mg/dL 0.0 0.0 - 2.0 mg/dL    Nitrite Urine Negative NEG^Negative    Leukocyte Esterase Urine Negative NEG^Negative    Source Unspecified Urine     WBC Urine 3 0 - 5 /HPF    RBC Urine 1 0 - 2 /HPF    Bacteria Urine Few (A) NEG^Negative /HPF     Squamous Epithelial /HPF Urine 2 (H) 0 - 1 /HPF    Mucous Urine Present (A) NEG^Negative /LPF    Hyaline Casts 1 0 - 2 /LPF    Amorphous Crystals Few (A) NEG^Negative /HPF   CBC with platelets differential   Result Value Ref Range    WBC 5.5 4.0 - 11.0 10e9/L    RBC Count 4.13 3.8 - 5.2 10e12/L    Hemoglobin 13.2 11.7 - 15.7 g/dL    Hematocrit 40.2 35.0 - 47.0 %    MCV 97 78 - 100 fl    MCH 32.0 26.5 - 33.0 pg    MCHC 32.8 31.5 - 36.5 g/dL    RDW 11.5 10.0 - 15.0 %    Platelet Count 207 150 - 450 10e9/L    Diff Method Automated Method     % Neutrophils 74.0 %    % Lymphocytes 17.0 %    % Monocytes 7.8 %    % Eosinophils 0.5 %    % Basophils 0.5 %    % Immature Granulocytes 0.2 %    Nucleated RBCs 0 0 /100    Absolute Neutrophil 4.1 1.6 - 8.3 10e9/L    Absolute Lymphocytes 0.9 0.8 - 5.3 10e9/L    Absolute Monocytes 0.4 0.0 - 1.3 10e9/L    Absolute Eosinophils 0.0 0.0 - 0.7 10e9/L    Absolute Basophils 0.0 0.0 - 0.2 10e9/L    Abs Immature Granulocytes 0.0 0 - 0.4 10e9/L    Absolute Nucleated RBC 0.0    Comprehensive metabolic panel   Result Value Ref Range    Sodium 138 133 - 144 mmol/L    Potassium 3.6 3.4 - 5.3 mmol/L    Chloride 102 94 - 109 mmol/L    Carbon Dioxide 33 (H) 20 - 32 mmol/L    Anion Gap 3 3 - 14 mmol/L    Glucose 96 70 - 99 mg/dL    Urea Nitrogen 10 7 - 30 mg/dL    Creatinine 0.39 (L) 0.52 - 1.04 mg/dL    GFR Estimate >90 >60 mL/min/[1.73_m2]    GFR Estimate If Black >90 >60 mL/min/[1.73_m2]    Calcium 9.4 8.5 - 10.1 mg/dL    Bilirubin Total 0.7 0.2 - 1.3 mg/dL    Albumin 3.6 3.4 - 5.0 g/dL    Protein Total 7.4 6.8 - 8.8 g/dL    Alkaline Phosphatase 64 40 - 150 U/L    ALT 21 0 - 50 U/L    AST 24 0 - 45 U/L   Lactic acid whole blood   Result Value Ref Range    Lactic Acid 0.8 0.7 - 2.0 mmol/L   Lipase   Result Value Ref Range    Lipase 90 73 - 393 U/L   CT Abdomen Pelvis w Contrast    Narrative    CT ABDOMEN AND PELVIS WITH CONTRAST   12/28/2019 4:06 PM     HISTORY: RLQ abdominal pain for 3 days -  generalized tenderness,  evaluate for appendicitis, SBO, ureteral stone.    TECHNIQUE:  CT abdomen and pelvis with 74mL Isovue-370 IV. Radiation  dose for this scan was reduced using automated exposure control,  adjustment of the mA and/or kV according to patient size, or iterative  reconstruction technique.    COMPARISON: CT abdomen and pelvis on 8/23/2019 lung bases:    FINDINGS:   Minimal bibasilar atelectasis.    Abdomen/pelvis: Diffuse intra and extrahepatic biliary dilatation,  likely reservoir effect post cholecystectomy, with slight increase of  the common bile duct dilatation currently measures 11 mm in diameter  (series 2 image 30), previously measured 9 mm on 8/23/2019 exam. No  splenic artery. Mild prominence of the main pancreatic duct, not  significantly changed as compared to 8/23/2019 exam. There is 2.3 cm  left adrenal nodule (series 2 image 21), not significantly changed as  compared to 3/29/2007 exam, and previously characterized as adrenal  adenoma. No right adrenal nodule.    No abnormally dilated bowel loops. No significant free fluid in the  abdomen or pelvis. No free peritoneal or portal venous gas.  Aortobiiliac endograft. Atherosclerotic vascular calcification  predominantly of the abdominal aorta and iliac vessels. Streak  artifacts from adjacent right hip arthroplasty limits detailed  evaluation of the pelvic organs.    Bones and soft tissue: Levoconvex rotoscoliosis of the lumbar spine  with associated multilevel degenerative changes. Post surgical changes  of right hip arthroplasty.      Impression    IMPRESSION:  1. No acute pathology in the abdomen or pelvis.  2. Diffuse intra and extrahepatic dilatation, likely reservoir effect  postcholecystectomy with slight increase of the common bile duct  dilatation, currently measures 11 mm in diameter, previously measured  9 mm on 11/23/2019 exam. No ductal stone visualized.  3. 2.3 cm left adrenal nodule, likely represent adrenal adenoma,  not  significantly changed as compared to 3/29/2007 exam.    .    JOE GONZALEZ MD       Medications   sodium chloride 0.9% infusion (has no administration in time range)   ondansetron (ZOFRAN) injection 4 mg (has no administration in time range)   HYDROmorphone (PF) (DILAUDID) injection 0.2 mg (has no administration in time range)   oxyCODONE (ROXICODONE) tablet 5 mg (has no administration in time range)       Assessments & Plan (with Medical Decision Making)     MDM: Mehreen Camara is a 79 year old female who presents with history of multiple system atrophy, orthostatic hypotension, hypothyroidism, chronic neuropathy right lower quadrants and lower extremities, prior small bowel obstruction, AAA status post repair with prior hysterectomy and cholecystectomy presenting with 3 days of right lower quadrant abdominal pain.  The pain coincides with her discontinuation of gabapentin due to dizziness and is now been restarted in the last day.  She however has tenderness to palpation on abdominal exam there is more generalized but especially over the right lower quadrant and differential diagnosis would include appendicitis, ureterolithiasis, small bowel obstruction, AAA.    CT of the abdomen pelvis is ordered as well as IV fluids Zofran.  She took hydrocodone prior to arrival and will get oxycodone orally as well as small dose of Dilaudid 0.2 mg and subsequent doses when this was ineffective at 0.5 mg dose.  Urinalysis blood count lactic acid for bowel ischemia is ordered.    Overall testing is reassuring and there is extensive stool in the distal colon.  We discussed possible bowel regimen as below.  Her history of neuropathy may be superimposed and the recent stopping and starting of gabapentin may have contributed to symptoms.  Recommendations as below.  Recommend follow-up with primary provider this week.  Precautions given for return.    I have reviewed the nursing notes.    I have reviewed the findings,  diagnosis, plan and need for follow up with the patient.       New Prescriptions    No medications on file       Final diagnoses:   Neuropathy - suspect the neuropathy is alos contirbuting. stay on gabapentin   Abdominal pain, generalized - No serious findings.  chronic changes seen such as biliary tract dilatation and adrenal mass (likely adenoma).  Use fleets enema.  Then magnesium citrate 300 ml bottle,.  Then miralax 1 capful daily for 7 days, maintain hydration.  when better start fiber supplement such as metamuci or citrucil.  return for fever, worsening,       12/28/2019   [facility] EMERGENCY DEPARTMENT     Jorge Morales MD  12/29/19 0044

## 2019-12-31 ENCOUNTER — MEDICAL CORRESPONDENCE (OUTPATIENT)
Dept: HEALTH INFORMATION MANAGEMENT | Facility: CLINIC | Age: 79
End: 2019-12-31

## 2019-12-31 ENCOUNTER — ASSISTED LIVING VISIT (OUTPATIENT)
Dept: GERIATRICS | Facility: CLINIC | Age: 79
End: 2019-12-31
Payer: COMMERCIAL

## 2019-12-31 VITALS
SYSTOLIC BLOOD PRESSURE: 128 MMHG | DIASTOLIC BLOOD PRESSURE: 68 MMHG | TEMPERATURE: 97.4 F | RESPIRATION RATE: 18 BRPM | HEART RATE: 80 BPM | OXYGEN SATURATION: 94 % | BODY MASS INDEX: 25.08 KG/M2 | WEIGHT: 155.4 LBS

## 2019-12-31 DIAGNOSIS — M54.42 CHRONIC BILATERAL LOW BACK PAIN WITH BILATERAL SCIATICA: ICD-10-CM

## 2019-12-31 DIAGNOSIS — R10.84 ABDOMINAL PAIN, GENERALIZED: ICD-10-CM

## 2019-12-31 DIAGNOSIS — F43.23 ADJUSTMENT DISORDER WITH MIXED ANXIETY AND DEPRESSED MOOD: ICD-10-CM

## 2019-12-31 DIAGNOSIS — M54.41 CHRONIC BILATERAL LOW BACK PAIN WITH BILATERAL SCIATICA: ICD-10-CM

## 2019-12-31 DIAGNOSIS — K59.04 CHRONIC IDIOPATHIC CONSTIPATION: Primary | ICD-10-CM

## 2019-12-31 DIAGNOSIS — G89.29 CHRONIC BILATERAL LOW BACK PAIN WITH BILATERAL SCIATICA: ICD-10-CM

## 2019-12-31 DIAGNOSIS — S86.012D RUPTURE OF LEFT ACHILLES TENDON, SUBSEQUENT ENCOUNTER: ICD-10-CM

## 2019-12-31 RX ORDER — GABAPENTIN 100 MG/1
CAPSULE ORAL
Status: ON HOLD | COMMUNITY
End: 2020-12-29

## 2019-12-31 RX ORDER — MAGNESIUM CARB/ALUMINUM HYDROX 105-160MG
296 TABLET,CHEWABLE ORAL DAILY PRN
COMMUNITY
End: 2020-06-17

## 2019-12-31 RX ORDER — POLYETHYLENE GLYCOL 3350 17 G/17G
17 POWDER, FOR SOLUTION ORAL 2 TIMES DAILY
COMMUNITY
End: 2020-06-15

## 2019-12-31 RX ORDER — ACETAMINOPHEN 500 MG
1000 TABLET ORAL EVERY 6 HOURS PRN
Status: ON HOLD | COMMUNITY
End: 2020-12-29

## 2019-12-31 NOTE — PROGRESS NOTES
[facility]  PRIMARY CARE PROVIDER AND CLINIC:  HAILEE Contreras CNP, [address] / [place] [address]  Chief Complaint   Patient presents with     Hospital F/U     ER Visit     [facility] Record Number:  [id number]  [facility] of [facility] where encounter took place:  [facility] [[id number]]     Mehreen Camara  is a 79 year old  (1940), admitted to the above facility from  [facility]. ER visit 12/28/19.  Admitted to this facility for  nursing care.    HPI:    HPI information obtained from: facility chart records, facility staff, patient report and [facility] chart review.   Brief Summary of Hospital Course: Mehreen was seen in the ED on 12/28/19 due to increased abdominal pain and concerns for ongoing constipation.  She was evaluated, given mag citrate for constipation and returned to home.    Updates on Status Since Skilled nursing Admission: she reports the mag citrate worked well and she had great results-feeling well for 1-2 days after diarrhea passed, now has increased lower abdominal pain and no BM for 3 days.  She reports she is eating well.     CODE STATUS/ADVANCE DIRECTIVES DISCUSSION:   CPR/Full code   Patient's living condition: lives in an assisted living facility  ALLERGIES: Penicillins; Aspirin; Atenolol; Atorvastatin; Bupropion; Clindamycin; Codeine; Ibuprofen; Lovastatin; and Cephalexin  PAST MEDICAL HISTORY:  has a past medical history of Adrenal adenoma, Arthritis, Depressive disorder, Hypertension, Rheumatic fever, Small bowel obstruction (H), and Thyroid disease.  PAST SURGICAL HISTORY:   has a past surgical history that includes orthopedic surgery; Thyroidectomy (2011); joint replacement (Right, 2003); Hysterectomy (2013); carpal tunnel release rt/lt (Bilateral, 2013); Spine surgery (1981); aaa repair (2015); and Laparoscopic cholecystectomy (N/A, 12/12/2018).  FAMILY HISTORY: family history includes Coronary Artery Disease in her  father and mother; Hypertension in her mother; Other Cancer in her brother; Parkinsonism in an other family member.  SOCIAL HISTORY:   reports that she quit smoking about 25 years ago. Her smoking use included cigarettes. She smoked 0.50 packs per day. She has never used smokeless tobacco. She reports that she does not drink alcohol or use drugs.    Post Discharge Medication Reconciliation Status: discharge medications reconciled and changed, per note/orders (see AVS)    Current Outpatient Medications   Medication Sig Dispense Refill     acetaminophen (TYLENOL) 500 MG tablet Take 500-1,000 mg by mouth 3 times daily as needed for mild pain       acetaminophen (TYLENOL) 500 MG tablet Take 1 tablet (500 mg) by mouth 3 times daily  0     betamethasone dipropionate (DIPROSONE) 0.05 % external cream Apply topically 2 times daily as needed       bisacodyl (DULCOLAX) 10 MG suppository Place 10 mg rectally every 3 days for constipation and as needed.       calcium carbonate (TUMS) 500 MG chewable tablet Take 1 tablet (500 mg) by mouth every 2 hours as needed for heartburn 150 tablet 1     carbidopa-levodopa (SINEMET)  MG tablet Take 2 tablets by mouth 3 times daily 0700, 1230, 1830       diclofenac (VOLTAREN) 1 % topical gel Apply 4 g topically 4 times daily as needed for moderate pain (to back and neck) 100 g 11     docusate sodium (COLACE) 100 MG tablet Take 100 mg by mouth daily       gabapentin (NEURONTIN) 100 MG capsule Take 200 mg by mouth 3 times daily       HYDROcodone-acetaminophen (NORCO) 5-325 MG tablet TAKE 1 TABLET BY MOUTH TWICE DAILY (MAX APAP 4GM/24HRS);TAKE 1 TABLET BY MOUTH EVERY 4 HOURS AS NEEDED (MAX APAP 4GM/24HRS) 100 tablet 0     magnesium citrate 1.745 GM/30ML solution Take 296 mLs by mouth daily as needed for other       mineral oil enema Place 1 enema rectally as needed for constipation 1 enema 0     polyethylene glycol (MIRALAX/GLYCOLAX) packet Take 17 g by mouth daily       polyethylene  glycol (MIRALAX/GLYCOLAX) packet Take 17 g by mouth daily as needed for constipation       polyethylene glycol-propylene glycol (SYSTANE ULTRA) 0.4-0.3 % SOLN ophthalmic solution Place 2 drops into both eyes 4 times daily as needed for dry eyes       Simethicone (GAS RELIEF) 125 MG CAPS Take 125 mg by mouth 4 times daily as needed (flatulence/gas)       triamcinolone (KENALOG) 0.1 % external cream Apply topically 2 times daily as needed for irritation       hydrochlorothiazide (HYDRODIURIL) 25 MG tablet Take 1 tablet (25 mg) by mouth daily (Patient taking differently: Take 25 mg by mouth daily )       levothyroxine (SYNTHROID/LEVOTHROID) 137 MCG tablet TAKE 1 TABLET BY MOUTH EVERY DAY DX HYPOTHYROIDISM (Patient taking differently: Take 137 mcg by mouth daily ) 30 tablet 11     losartan (COZAAR) 50 MG tablet TAKE TABLET BY MOUTH EVERY MORNING (Patient taking differently: Take 50 mg by mouth every morning ) 30 tablet 11     melatonin 3 MG tablet Take 1 tablet (3 mg) by mouth At Bedtime (Patient taking differently: Take 3 mg by mouth At Bedtime )       Multiple Vitamin (TAB-A-KATIA) TABS TAKE 1 TABLET BY MOUTH EVERY DAY FOR SUPPLEMENT, WEIGHT LOSS (Patient taking differently: Take 1 tablet by mouth daily ) 100 tablet 11     omeprazole (PRILOSEC) 20 MG DR capsule TAKE 1 CAPSULE BY MOUTH TWICE DAILY DX GASTROESOPHAGEAL REFLUX DISEASE (Patient taking differently: Take 20 mg by mouth 2 times daily 0700, 1830) 60 capsule 11     ranitidine (ZANTAC) 150 MG tablet TAKE 1 TABLET BY MOUTH DAILY AT BEDTIME DX GASTROESOPHAGEAL REFLUX DISEASE (Patient taking differently: Take 150 mg by mouth every evening ) 30 tablet 11     sertraline (ZOLOFT) 25 MG tablet Take 3 tablets (75 mg) by mouth At Bedtime 60 tablet 0     White Petrolatum (VASELINE) ointment Apply topically 3 times daily Apply to vulva 2-3 times a day 0700, 1200, 1830         ROS:  4 point ROS including Respiratory, CV, GI and , other than that noted in the HPI,  is  negative    Vitals:  /68   Pulse 80   Temp 97.4  F (36.3  C)   Resp 18   Wt 70.5 kg (155 lb 6.4 oz)   SpO2 94%   BMI 25.08 kg/m    Exam:  GENERAL APPEARANCE:  Alert, in no distress   HEAD:  Normal, normocephalic, atraumatic  ENT:  Mouth and posterior oropharynx normal, moist mucous membranes, hearing acuity - within normal limits   EYE EXAM:  EOM, conjunctivae, lids, pupils and irises normal   CHEST/RESP:  respiratory effort normal, no respiratory distress, lung sounds CTA    CV:  Rate and rhythm reg, no murmur/rub/gallop, no peripheral edema  M/S:   extremities normal, gait abnormal-using AFO on L LE for better control, digits and nails within normal limits   NEUROLOGIC EXAM: Normal gross motor movement, tone and coordination. No tremor. Cranial nerves 2-12 are normal tested and grossly at patient's baseline  PSYCH:  Alert and oriented to person-place-time, affect anxious     Lab/Diagnostic data:  Recent labs in [facility] reviewed by me today.     ASSESSMENT/PLAN:  Chronic idiopathic constipation  Abdominal pain, generalized  Patient with ongoing constipation, difficulty emptying and recently to ED for increased abdominal pain.  She was given Mag Citrate with significant improvement in pain, relief of constipation but did have diarrhea x 36 h after ED visit.       Rupture of left Achilles tendon, subsequent encounter  Now wearing an AFO on the L LE due to rupture of Achilles tendon, using CAM walker boot at hs for comfort when getting up at night.  She is very frustrated by this, would like this to be fixed, but alternative is likely surgery-which she is not interested in.    -continue with the use of the AFO for now.     Chronic bilateral low back pain with bilateral sciatica  Ongoing low back pain, still on Norco 5/325 BID and q4h prn, usually using three times per day.  She is reluctant to decrease this dose.      Adjustment disorder with mixed anxiety and depressed mood  Stable on current plan. She is  quite anxious today.         Orders written by provider at facility and transcribed by : Te Sargent  1. discontinue Dulcolax supp Q3 days  2. Magnesium citrate 1.745 G/30mL  -give 140 mL every day PRN PO for constipation or abdominal pain      Electronically signed by:  HAILEE Contreras CNP

## 2020-01-03 ENCOUNTER — MEDICAL CORRESPONDENCE (OUTPATIENT)
Dept: HEALTH INFORMATION MANAGEMENT | Facility: CLINIC | Age: 80
End: 2020-01-03

## 2020-01-03 ENCOUNTER — DOCUMENTATION ONLY (OUTPATIENT)
Dept: NEUROLOGY | Facility: CLINIC | Age: 80
End: 2020-01-03

## 2020-01-03 NOTE — PROGRESS NOTES
Received BP login  Records Date of service: 1/3/20  Copy has been sent to scanning and encounter routed to specialty nurse for review. FYI was placed in provider folder

## 2020-01-10 ENCOUNTER — MEDICAL CORRESPONDENCE (OUTPATIENT)
Dept: HEALTH INFORMATION MANAGEMENT | Facility: CLINIC | Age: 80
End: 2020-01-10

## 2020-01-13 ENCOUNTER — TELEPHONE (OUTPATIENT)
Dept: NEUROLOGY | Facility: CLINIC | Age: 80
End: 2020-01-13

## 2020-01-13 NOTE — TELEPHONE ENCOUNTER
Received BP sheet   Records Date of service: 1/13/19  Copy has been sent to scanning and encounter routed to specialty nurse for review.FYI was placed in provider folder       
EKG completed

## 2020-01-14 ENCOUNTER — ASSISTED LIVING VISIT (OUTPATIENT)
Dept: GERIATRICS | Facility: CLINIC | Age: 80
End: 2020-01-14
Payer: COMMERCIAL

## 2020-01-14 VITALS
DIASTOLIC BLOOD PRESSURE: 79 MMHG | BODY MASS INDEX: 25.08 KG/M2 | RESPIRATION RATE: 18 BRPM | SYSTOLIC BLOOD PRESSURE: 161 MMHG | TEMPERATURE: 97.4 F | OXYGEN SATURATION: 94 % | WEIGHT: 155.4 LBS | HEART RATE: 80 BPM

## 2020-01-14 DIAGNOSIS — K59.01 SLOW TRANSIT CONSTIPATION: ICD-10-CM

## 2020-01-14 DIAGNOSIS — M54.42 CHRONIC BILATERAL LOW BACK PAIN WITH BILATERAL SCIATICA: ICD-10-CM

## 2020-01-14 DIAGNOSIS — I95.1 ORTHOSTATIC HYPOTENSION DYSAUTONOMIC SYNDROME: ICD-10-CM

## 2020-01-14 DIAGNOSIS — S86.012D RUPTURE OF LEFT ACHILLES TENDON, SUBSEQUENT ENCOUNTER: ICD-10-CM

## 2020-01-14 DIAGNOSIS — M54.41 CHRONIC BILATERAL LOW BACK PAIN WITH BILATERAL SCIATICA: ICD-10-CM

## 2020-01-14 DIAGNOSIS — I10 ESSENTIAL HYPERTENSION: ICD-10-CM

## 2020-01-14 DIAGNOSIS — G89.29 CHRONIC BILATERAL LOW BACK PAIN WITH BILATERAL SCIATICA: ICD-10-CM

## 2020-01-14 DIAGNOSIS — G23.2 MULTIPLE SYSTEM ATROPHY P (H): Primary | ICD-10-CM

## 2020-01-14 DIAGNOSIS — Z00.00 ANNUAL PHYSICAL EXAM: ICD-10-CM

## 2020-01-14 DIAGNOSIS — F33.9 EPISODE OF RECURRENT MAJOR DEPRESSIVE DISORDER, UNSPECIFIED DEPRESSION EPISODE SEVERITY (H): ICD-10-CM

## 2020-01-14 DIAGNOSIS — R10.84 ABDOMINAL PAIN, GENERALIZED: ICD-10-CM

## 2020-01-14 NOTE — PROGRESS NOTES
[facility]  Chief Complaint   Patient presents with     Annual Comprehensive Nursing Home     [facility] Medical Record Number:  [id number]  [facility] where encounter took place[facility] [[id number]]    HPI:    Mehreen Camara  is a 80 year old  (1940), who is being seen today for an annual comprehensive visit. HPI information obtained from: facility chart records, facility staff, patient report and [facility] Epic chart review.  Today's concerns are:     Multiple system atrophy P (H)  Episode of recurrent major depressive disorder, unspecified depression episode severity (H)  Rupture of left Achilles tendon, subsequent encounter  Abdominal pain, generalized  Slow transit constipation  Chronic bilateral low back pain with bilateral sciatica  Orthostatic hypotension dysautonomic syndrome (H)  Essential hypertension     Mehreen reports she continues to have pain in back/lower R abdomen.  She reports she is feeling off balance due to new shoes and L AFO. I did observe her ambulating from room to dining room for breakfast without stumbling.  Nursing reports bowels seem to be working better. Mehreen continues to have many worries-she wonders if the pain pills are really working but is reluctant to stop taking them, she is worried about her bowels all the time, and perseverates on them.        ALLERGIES: Penicillins; Aspirin; Atenolol; Atorvastatin; Bupropion; Clindamycin; Codeine; Ibuprofen; Lovastatin; and Cephalexin  PAST MEDICAL HISTORY:  has a past medical history of Adrenal adenoma, Arthritis, Depressive disorder, Hypertension, Rheumatic fever, Small bowel obstruction (H), and Thyroid disease.  PAST SURGICAL HISTORY:  has a past surgical history that includes orthopedic surgery; Thyroidectomy (2011); joint replacement (Right, 2003); Hysterectomy (2013); carpal tunnel release rt/lt (Bilateral, 2013); Spine surgery (1981); aaa repair (2015); and Laparoscopic cholecystectomy (N/A,  12/12/2018).  IMMUNIZATIONS:  Immunization History   Administered Date(s) Administered     Influenza (High Dose) 3 valent vaccine 09/05/2017, 09/04/2018     Influenza (intradermal) 09/27/2019     Pneumo Conj 13-V (2010&after) 09/22/2015     Pneumococcal 23 valent 11/02/2010     Tdap (Adult) Unspecified Formulation 06/08/2011     Zoster vaccine, live 06/08/2011     Above immunizations pulled from [facility]. [facility] and facility records also reconciled. Outstanding information sent to  to update [facility] .  Future immunizations are not needed at this point as all recommended immunizations are up to date.     Current Outpatient Medications   Medication Sig Dispense Refill     acetaminophen (TYLENOL) 500 MG tablet Take 500-1,000 mg by mouth 3 times daily as needed for mild pain       acetaminophen (TYLENOL) 500 MG tablet Take 1 tablet (500 mg) by mouth 3 times daily  0     betamethasone dipropionate (DIPROSONE) 0.05 % external cream Apply topically 2 times daily as needed       bisacodyl (DULCOLAX) 10 MG suppository Place 10 mg rectally every 3 days for constipation and as needed.       calcium carbonate (TUMS) 500 MG chewable tablet Take 1 tablet (500 mg) by mouth every 2 hours as needed for heartburn 150 tablet 1     carbidopa-levodopa (SINEMET)  MG tablet Take 2 tablets by mouth 3 times daily 0700, 1230, 1830       diclofenac (VOLTAREN) 1 % topical gel Apply 4 g topically 4 times daily as needed for moderate pain (to back and neck) 100 g 11     docusate sodium (COLACE) 100 MG tablet Take 100 mg by mouth daily       gabapentin (NEURONTIN) 100 MG capsule Take 200 mg by mouth 3 times daily       hydrochlorothiazide (HYDRODIURIL) 25 MG tablet Take 1 tablet (25 mg) by mouth daily       HYDROcodone-acetaminophen (NORCO) 5-325 MG tablet TAKE 1 TABLET BY MOUTH TWICE DAILY (MAX APAP 4GM/24HRS);TAKE 1 TABLET BY MOUTH EVERY 4 HOURS AS NEEDED (MAX APAP 4GM/24HRS) 100 tablet 0     levothyroxine  (SYNTHROID/LEVOTHROID) 137 MCG tablet TAKE 1 TABLET BY MOUTH EVERY DAY DX HYPOTHYROIDISM 30 tablet 11     losartan (COZAAR) 50 MG tablet TAKE TABLET BY MOUTH EVERY MORNING 30 tablet 11     magnesium citrate 1.745 GM/30ML solution Take 296 mLs by mouth daily as needed for other       melatonin 3 MG tablet Take 1 tablet (3 mg) by mouth At Bedtime       mineral oil enema Place 1 enema rectally as needed for constipation 1 enema 0     Multiple Vitamin (TAB-A-KATIA) TABS TAKE 1 TABLET BY MOUTH EVERY DAY FOR SUPPLEMENT, WEIGHT LOSS 100 tablet 11     omeprazole (PRILOSEC) 20 MG DR capsule TAKE 1 CAPSULE BY MOUTH TWICE DAILY DX GASTROESOPHAGEAL REFLUX DISEASE 60 capsule 11     polyethylene glycol (MIRALAX/GLYCOLAX) packet Take 17 g by mouth daily       polyethylene glycol (MIRALAX/GLYCOLAX) packet Take 17 g by mouth daily as needed for constipation       polyethylene glycol-propylene glycol (SYSTANE ULTRA) 0.4-0.3 % SOLN ophthalmic solution Place 2 drops into both eyes 4 times daily as needed for dry eyes       ranitidine (ZANTAC) 150 MG tablet TAKE 1 TABLET BY MOUTH DAILY AT BEDTIME DX GASTROESOPHAGEAL REFLUX DISEASE 30 tablet 11     sertraline (ZOLOFT) 25 MG tablet Take 3 tablets (75 mg) by mouth At Bedtime 60 tablet 0     Simethicone (GAS RELIEF) 125 MG CAPS Take 125 mg by mouth 4 times daily as needed (flatulence/gas)       triamcinolone (KENALOG) 0.1 % external cream Apply topically 2 times daily as needed for irritation       White Petrolatum (VASELINE) ointment Apply topically 3 times daily Apply to vulva 2-3 times a day 0700, 1200, 1830       Case Management:  I have reviewed the Assisted Living care plan, current immunizations and preventive care/cancer screening. .Future cancer screening is not clinically indicated secondary to age/goals of care Patient's desire to return to the community is currently living in a community environment. Current Level of Care is appropriate.    Advance Directive Discussion:    I  reviewed the current advanced directives as reflected in EPIC, the POLST and the facility chart, and verified the congruency of orders. I contacted the first party , daughter, and left a message regarding the plan of Care.  I did review the advance directives with the resident.     Team Discussion:  I communicated with the appropriate disciplines involved with the Plan of Care:   Nursing   and PT    Patient's goal is pain control and comfort and family's/responsible party's goal for the patient is pain control and comfort.  Information reviewed:  Medications, vital signs, orders, and nursing notes.    ROS:  4 point ROS including Respiratory, CV, GI and , other than that noted in the HPI,  is negative    Vitals:  BP (!) 161/79   Pulse 80   Temp 97.4  F (36.3  C)   Resp 18   Wt 70.5 kg (155 lb 6.4 oz)   SpO2 94%   BMI 25.08 kg/m   Body mass index is 25.08 kg/m .  Exam:  GENERAL APPEARANCE:  Alert, in no acute distress   HEAD:  Normal, normocephalic, atraumatic  ENT:  Mouth and posterior oropharynx normal, moist mucous membranes, hearing acuity - within normal limits   EYE EXAM:  EOM, conjunctivae, lids, pupils and irises normal   CHEST/RESP:  respiratory effort normal, no respiratory distress   CV:  Rate and rhythm reg, no murmur/rub/gallop, no peripheral edema  M/S:   extremities normal, gait normal-using AFO on L LE with new athletic shoes and walking with 4w rolling walker, somewhat unsteady, digits and nails within normal limits   SKIN:  CDI on L LE  NEUROLOGIC EXAM: Normal gross motor movement, tone and coordination. No tremor. Cranial nerves 2-12 are normal tested and grossly at patient's baseline  PSYCH:  Alert and oriented to person-place-time , affect anxious at baseline and remains so without significant change       Lab/Diagnostic data:     Most Recent 3 CBC's:  Recent Labs   Lab Test 12/28/19  1408 08/23/19  2229 03/02/19  1552   WBC 5.5 10.6 5.5   HGB 13.2 12.7 12.8   MCV 97 100 98    181  190     Most Recent 3 BMP's:  Recent Labs   Lab Test 12/28/19  1408 08/23/19  2229 03/02/19  1552    139 140   POTASSIUM 3.6 3.7 3.7   CHLORIDE 102 101 101   CO2 33* 34* 33*   BUN 10 18 12   CR 0.39* 0.53 0.54   ANIONGAP 3 4 6   YADIRA 9.4 8.6 8.6   GLC 96 90 120*     Most Recent 2 LFT's:  Recent Labs   Lab Test 12/28/19  1408 08/23/19  2229   AST 24 19   ALT 21 13   ALKPHOS 64 65   BILITOTAL 0.7 0.4       ASSESSMENT/PLAN  Multiple system atrophy P (H)  Orthostatic hypotension dysautonomic syndrome (H)  Stable condition, remains on sinemet with no recent changes.  Last seen by neurology 11/12/19 and appreciate close follow up and condition stable.  She is to see Dr. Monterroso about every 6 months.    Ortho BP on 1/3/20      Episode of recurrent major depressive disorder, unspecified depression episode severity (H)  She continues to have significant anxiety and depression, but overall better.  On sertraline 75 mg daily  -consider increase of sertraline if she would be agreeable.  She wants to make no changes today    Rupture of left Achilles tendon, subsequent encounter  She continues to struggle with the L ruptured achilles.  Surgical repair was not recommended, but she is so tired of wearing the AFO and feels she cannot walk easily with it.  She is currently getting PT to improve gait/balance and functional mobility.  The AFO appears to fit well and is causing no skin breakdown.  She is followed closely by [facility] for this.     Abdominal pain, generalized  Slow transit constipation  She continues to report chronic abdominal pain, most often lower R quadrant.  She continues to feel as if she is unable to evacuate her bowels completely.  She is currently on miralax, mineral oil enema, mag citrate, colace, dulc supp.  Most recently, she feels bowels move best if taking the prn mag citrate occasionally.    -encouraged her to use mag citrate no more than 2-3 times per week if possible, she reports less use  than that.     Chronic bilateral low back pain with bilateral sciatica  She continues to have diffuse low back pain, sciatica.  She is on norco BID and prn as well as tylenol 500 TID and prn, gabapentin 200 tid.  She uses the Norco about 1x daily prn as well.  She does not think the Norco works very well, still has pain.  She does not want to become addicted to narcotic pain medications and is anxious about this.  She would like to stop taking the Norco, but is reluctant to even stop the prn dose she usually takes at 12 noon. She finds the current gabapentin dose to be effective.   -encouraged her to stop using the Norco prn if she can, then could consider decrease of current BID dosing as she tolerates for pain  -consider increasing dose of extra strength tylenol IF Norco use decreases    Essential hypertension  Mehreen's BP is quite labile, with significant orthostatic hypotension. She is working closely with neurology to manage BP.  BP goals are  <140/90 mm Hg.This is higher than ACC and AHA recommendations due to risk for hypotension, risk of dizziness and falls and frailty. Patient is stable with current plan of care and routine assessment.     Annual 1/14/20      Orders written by provider at facility and transcribed by : Te Sargent  1. No new orders at this time, encouraged to not use the prn Norco if possible.     Electronically signed by:  HAILEE Contreras CNP

## 2020-01-15 DIAGNOSIS — G89.29 CHRONIC BILATERAL LOW BACK PAIN WITH BILATERAL SCIATICA: ICD-10-CM

## 2020-01-15 DIAGNOSIS — M54.42 CHRONIC BILATERAL LOW BACK PAIN WITH BILATERAL SCIATICA: ICD-10-CM

## 2020-01-15 DIAGNOSIS — M54.41 CHRONIC BILATERAL LOW BACK PAIN WITH BILATERAL SCIATICA: ICD-10-CM

## 2020-01-15 RX ORDER — HYDROCODONE BITARTRATE AND ACETAMINOPHEN 5; 325 MG/1; MG/1
TABLET ORAL
Qty: 100 TABLET | Refills: 0 | Status: SHIPPED | OUTPATIENT
Start: 2020-01-15 | End: 2020-01-22

## 2020-01-16 ENCOUNTER — MEDICAL CORRESPONDENCE (OUTPATIENT)
Dept: HEALTH INFORMATION MANAGEMENT | Facility: CLINIC | Age: 80
End: 2020-01-16

## 2020-01-17 ENCOUNTER — DOCUMENTATION ONLY (OUTPATIENT)
Dept: NEUROLOGY | Facility: CLINIC | Age: 80
End: 2020-01-17

## 2020-01-17 NOTE — PROGRESS NOTES
Received BP records from [facility], was placed in provider folder for signature      Form was signed by provider, fax to [phone], sent to be scanned

## 2020-01-21 ENCOUNTER — MEDICAL CORRESPONDENCE (OUTPATIENT)
Dept: HEALTH INFORMATION MANAGEMENT | Facility: CLINIC | Age: 80
End: 2020-01-21

## 2020-01-22 DIAGNOSIS — M54.41 CHRONIC BILATERAL LOW BACK PAIN WITH BILATERAL SCIATICA: ICD-10-CM

## 2020-01-22 DIAGNOSIS — G89.29 CHRONIC BILATERAL LOW BACK PAIN WITH BILATERAL SCIATICA: ICD-10-CM

## 2020-01-22 DIAGNOSIS — M54.42 CHRONIC BILATERAL LOW BACK PAIN WITH BILATERAL SCIATICA: ICD-10-CM

## 2020-01-22 RX ORDER — HYDROCODONE BITARTRATE AND ACETAMINOPHEN 5; 325 MG/1; MG/1
TABLET ORAL
Qty: 100 TABLET | Refills: 0 | Status: SHIPPED | OUTPATIENT
Start: 2020-01-22 | End: 2020-03-18

## 2020-01-27 ENCOUNTER — APPOINTMENT (OUTPATIENT)
Dept: GENERAL RADIOLOGY | Facility: CLINIC | Age: 80
End: 2020-01-27
Attending: EMERGENCY MEDICINE
Payer: COMMERCIAL

## 2020-01-27 ENCOUNTER — HOSPITAL ENCOUNTER (EMERGENCY)
Facility: CLINIC | Age: 80
Discharge: HOME | End: 2020-01-27
Attending: EMERGENCY MEDICINE | Admitting: FAMILY MEDICINE
Payer: COMMERCIAL

## 2020-01-27 ENCOUNTER — APPOINTMENT (OUTPATIENT)
Dept: CT IMAGING | Facility: CLINIC | Age: 80
End: 2020-01-27
Attending: FAMILY MEDICINE
Payer: COMMERCIAL

## 2020-01-27 ENCOUNTER — COMMUNICATION - HEALTHEAST (OUTPATIENT)
Dept: CARDIOLOGY | Facility: CLINIC | Age: 80
End: 2020-01-27

## 2020-01-27 VITALS
WEIGHT: 150 LBS | OXYGEN SATURATION: 98 % | TEMPERATURE: 98 F | SYSTOLIC BLOOD PRESSURE: 138 MMHG | RESPIRATION RATE: 18 BRPM | HEART RATE: 79 BPM | BODY MASS INDEX: 24.21 KG/M2 | DIASTOLIC BLOOD PRESSURE: 72 MMHG

## 2020-01-27 DIAGNOSIS — R10.84 ABDOMINAL PAIN, GENERALIZED: ICD-10-CM

## 2020-01-27 DIAGNOSIS — K59.01 SLOW TRANSIT CONSTIPATION: ICD-10-CM

## 2020-01-27 DIAGNOSIS — M51.369 DDD (DEGENERATIVE DISC DISEASE), LUMBAR: ICD-10-CM

## 2020-01-27 LAB
ALBUMIN SERPL-MCNC: 3 G/DL (ref 3.4–5)
ALBUMIN UR-MCNC: NEGATIVE MG/DL
ALP SERPL-CCNC: 73 U/L (ref 40–150)
ALT SERPL W P-5'-P-CCNC: 16 U/L (ref 0–50)
ANION GAP SERPL CALCULATED.3IONS-SCNC: 3 MMOL/L (ref 3–14)
APPEARANCE UR: CLEAR
AST SERPL W P-5'-P-CCNC: 21 U/L (ref 0–45)
BASOPHILS # BLD AUTO: 0.1 10E9/L (ref 0–0.2)
BASOPHILS NFR BLD AUTO: 0.8 %
BILIRUB SERPL-MCNC: 0.4 MG/DL (ref 0.2–1.3)
BILIRUB UR QL STRIP: NEGATIVE
BUN SERPL-MCNC: 14 MG/DL (ref 7–30)
CALCIUM SERPL-MCNC: 8.8 MG/DL (ref 8.5–10.1)
CHLORIDE SERPL-SCNC: 105 MMOL/L (ref 94–109)
CO2 SERPL-SCNC: 26 MMOL/L (ref 20–32)
COLOR UR AUTO: ABNORMAL
CREAT SERPL-MCNC: 0.45 MG/DL (ref 0.52–1.04)
DIFFERENTIAL METHOD BLD: ABNORMAL
EOSINOPHIL # BLD AUTO: 0.1 10E9/L (ref 0–0.7)
EOSINOPHIL NFR BLD AUTO: 1.5 %
ERYTHROCYTE [DISTWIDTH] IN BLOOD BY AUTOMATED COUNT: 11.9 % (ref 10–15)
GFR SERPL CREATININE-BSD FRML MDRD: >90 ML/MIN/{1.73_M2}
GLUCOSE SERPL-MCNC: 92 MG/DL (ref 70–99)
GLUCOSE UR STRIP-MCNC: NEGATIVE MG/DL
HCT VFR BLD AUTO: 44.2 % (ref 35–47)
HGB BLD-MCNC: 13.7 G/DL (ref 11.7–15.7)
HGB UR QL STRIP: NEGATIVE
IMM GRANULOCYTES # BLD: 0 10E9/L (ref 0–0.4)
IMM GRANULOCYTES NFR BLD: 0.2 %
KETONES UR STRIP-MCNC: NEGATIVE MG/DL
LEUKOCYTE ESTERASE UR QL STRIP: ABNORMAL
LIPASE SERPL-CCNC: 98 U/L (ref 73–393)
LYMPHOCYTES # BLD AUTO: 1.9 10E9/L (ref 0.8–5.3)
LYMPHOCYTES NFR BLD AUTO: 30.8 %
MCH RBC QN AUTO: 31.9 PG (ref 26.5–33)
MCHC RBC AUTO-ENTMCNC: 31 G/DL (ref 31.5–36.5)
MCV RBC AUTO: 103 FL (ref 78–100)
MONOCYTES # BLD AUTO: 0.5 10E9/L (ref 0–1.3)
MONOCYTES NFR BLD AUTO: 7.5 %
MUCOUS THREADS #/AREA URNS LPF: PRESENT /LPF
NEUTROPHILS # BLD AUTO: 3.6 10E9/L (ref 1.6–8.3)
NEUTROPHILS NFR BLD AUTO: 59.2 %
NITRATE UR QL: NEGATIVE
NRBC # BLD AUTO: 0 10*3/UL
NRBC BLD AUTO-RTO: 0 /100
PH UR STRIP: 7.5 PH (ref 5–7)
PLATELET # BLD AUTO: 234 10E9/L (ref 150–450)
PLATELET # BLD EST: ABNORMAL 10*3/UL
POTASSIUM SERPL-SCNC: 3.6 MMOL/L (ref 3.4–5.3)
PROT SERPL-MCNC: 7.5 G/DL (ref 6.8–8.8)
RBC # BLD AUTO: 4.29 10E12/L (ref 3.8–5.2)
RBC #/AREA URNS AUTO: 0 /HPF (ref 0–2)
SODIUM SERPL-SCNC: 135 MMOL/L (ref 133–144)
SOURCE: ABNORMAL
SP GR UR STRIP: 1.01 (ref 1–1.03)
SQUAMOUS #/AREA URNS AUTO: 1 /HPF (ref 0–1)
TRANS CELLS #/AREA URNS HPF: <1 /HPF (ref 0–1)
UROBILINOGEN UR STRIP-MCNC: NORMAL MG/DL (ref 0–2)
WBC # BLD AUTO: 6.1 10E9/L (ref 4–11)
WBC #/AREA URNS AUTO: 1 /HPF (ref 0–5)

## 2020-01-27 PROCEDURE — 74177 CT ABD & PELVIS W/CONTRAST: CPT

## 2020-01-27 PROCEDURE — 72132 CT LUMBAR SPINE W/DYE: CPT

## 2020-01-27 PROCEDURE — 74019 RADEX ABDOMEN 2 VIEWS: CPT

## 2020-01-27 PROCEDURE — 99285 EMERGENCY DEPT VISIT HI MDM: CPT | Mod: Z6 | Performed by: FAMILY MEDICINE

## 2020-01-27 PROCEDURE — 83690 ASSAY OF LIPASE: CPT | Performed by: FAMILY MEDICINE

## 2020-01-27 PROCEDURE — 99285 EMERGENCY DEPT VISIT HI MDM: CPT | Mod: 25 | Performed by: FAMILY MEDICINE

## 2020-01-27 PROCEDURE — 83690 ASSAY OF LIPASE: CPT | Performed by: EMERGENCY MEDICINE

## 2020-01-27 PROCEDURE — 96374 THER/PROPH/DIAG INJ IV PUSH: CPT | Mod: 59 | Performed by: FAMILY MEDICINE

## 2020-01-27 PROCEDURE — 81001 URINALYSIS AUTO W/SCOPE: CPT | Performed by: EMERGENCY MEDICINE

## 2020-01-27 PROCEDURE — 25000128 H RX IP 250 OP 636: Performed by: FAMILY MEDICINE

## 2020-01-27 PROCEDURE — 80053 COMPREHEN METABOLIC PANEL: CPT | Performed by: EMERGENCY MEDICINE

## 2020-01-27 PROCEDURE — 25000132 ZZH RX MED GY IP 250 OP 250 PS 637: Performed by: FAMILY MEDICINE

## 2020-01-27 PROCEDURE — 25000132 ZZH RX MED GY IP 250 OP 250 PS 637: Performed by: EMERGENCY MEDICINE

## 2020-01-27 PROCEDURE — 85025 COMPLETE CBC W/AUTO DIFF WBC: CPT | Performed by: EMERGENCY MEDICINE

## 2020-01-27 RX ORDER — IOPAMIDOL 755 MG/ML
92 INJECTION, SOLUTION INTRAVASCULAR ONCE
Status: COMPLETED | OUTPATIENT
Start: 2020-01-27 | End: 2020-01-27

## 2020-01-27 RX ORDER — HYDROCODONE BITARTRATE AND ACETAMINOPHEN 5; 325 MG/1; MG/1
1 TABLET ORAL ONCE
Status: COMPLETED | OUTPATIENT
Start: 2020-01-27 | End: 2020-01-27

## 2020-01-27 RX ORDER — CARBIDOPA AND LEVODOPA 25; 100 MG/1; MG/1
2 TABLET ORAL ONCE
Status: COMPLETED | OUTPATIENT
Start: 2020-01-27 | End: 2020-01-27

## 2020-01-27 RX ORDER — HYDROMORPHONE HCL/0.9% NACL/PF 0.2MG/0.2
0.2 SYRINGE (ML) INTRAVENOUS
Status: DISCONTINUED | OUTPATIENT
Start: 2020-01-27 | End: 2020-01-27 | Stop reason: HOSPADM

## 2020-01-27 RX ORDER — GABAPENTIN 100 MG/1
200 CAPSULE ORAL ONCE
Status: COMPLETED | OUTPATIENT
Start: 2020-01-27 | End: 2020-01-27

## 2020-01-27 RX ADMIN — HYDROCODONE BITARTRATE AND ACETAMINOPHEN 1 TABLET: 5; 325 TABLET ORAL at 15:52

## 2020-01-27 RX ADMIN — IOPAMIDOL 92 ML: 755 INJECTION, SOLUTION INTRAVENOUS at 19:16

## 2020-01-27 RX ADMIN — CARBIDOPA AND LEVODOPA 2 TABLET: 25; 100 TABLET ORAL at 18:35

## 2020-01-27 RX ADMIN — OMEPRAZOLE 20 MG: 20 CAPSULE, DELAYED RELEASE ORAL at 18:35

## 2020-01-27 RX ADMIN — GABAPENTIN 200 MG: 100 CAPSULE ORAL at 18:35

## 2020-01-27 RX ADMIN — Medication 0.2 MG: at 17:56

## 2020-01-27 ASSESSMENT — ENCOUNTER SYMPTOMS
ABDOMINAL PAIN: 1
CONFUSION: 0
EYE REDNESS: 0
NECK STIFFNESS: 0
VOMITING: 0
HEADACHES: 0
COLOR CHANGE: 0
FEVER: 0
NAUSEA: 1
BACK PAIN: 1
DIFFICULTY URINATING: 0
ARTHRALGIAS: 0
CONSTIPATION: 1
SHORTNESS OF BREATH: 0

## 2020-01-27 NOTE — ED PROVIDER NOTES
History     Chief Complaint   Patient presents with     Back Pain     HPI  Mehreen Camara is a 80 year old female with a history of hypothyroidism, small bowel obstruction, depression, chronic pain on chronic opiates, and constipation who presents to the Emergency Department today for evaluation of abdominal pain and back pain.  The patient reports that she has back pain that she has had for some time.  She has been taking Norco for her back pain without much improvement.  The patient also reports that she has been having abdominal pain since Friday.  She reports that this pain is similar to pain she has had in the past with bowel obstructions.  She also reports having nausea but no vomiting.  She has not had a bowel movement in 2 days, but is passing some gas.  Patient has taken MiraLAX for her constipation without relief.  The patient also notes she has had a decrease in urine output.  She denies fever, dysuria.    I have reviewed the Medications, Allergies, Past Medical and Surgical History, and Social History in the [facility] system.    Past Medical History:   Diagnosis Date     Adrenal adenoma     stable, thought not to be hormonally active     Arthritis      Depressive disorder      Hypertension      Rheumatic fever     thought to have had as a child     Small bowel obstruction (H)      Thyroid disease      Past Surgical History:   Procedure Laterality Date     AAA REPAIR  2015    [facility] bifurcation graft     CARPAL TUNNEL RELEASE RT/LT Bilateral 2013     HYSTERECTOMY  2013     JOINT REPLACEMENT Right 2003     LAPAROSCOPIC CHOLECYSTECTOMY N/A 12/12/2018    Procedure: LAPAROSCOPIC CHOLECYSTECTOMY;  Surgeon: Amadeo Sebastian MD;  Location: [facility] OR     ORTHOPEDIC SURGERY       SPINE SURGERY  1981    cervical spine fusion     THYROIDECTOMY  2011     Family History   Problem Relation Age of Onset     Hypertension Mother      Coronary Artery Disease Mother      Coronary Artery Disease Father      Other Cancer Brother       Parkinsonism Other      Social History     Tobacco Use     Smoking status: Former Smoker     Packs/day: 0.50     Types: Cigarettes     Last attempt to quit: 1994     Years since quittin.1     Smokeless tobacco: Never Used   Substance Use Topics     Alcohol use: No     No current facility-administered medications for this encounter.      Current Outpatient Medications   Medication     acetaminophen (TYLENOL) 500 MG tablet     acetaminophen (TYLENOL) 500 MG tablet     betamethasone dipropionate (DIPROSONE) 0.05 % external cream     bisacodyl (DULCOLAX) 10 MG suppository     BISACODYL PO     calcium carbonate (TUMS) 500 MG chewable tablet     carbidopa-levodopa (SINEMET)  MG tablet     diclofenac (VOLTAREN) 1 % topical gel     gabapentin (NEURONTIN) 100 MG capsule     gabapentin (NEURONTIN) 100 MG capsule     hydrochlorothiazide (HYDRODIURIL) 25 MG tablet     HYDROcodone-acetaminophen (NORCO) 5-325 MG tablet     levothyroxine (SYNTHROID/LEVOTHROID) 137 MCG tablet     losartan (COZAAR) 50 MG tablet     magnesium citrate 1.745 GM/30ML solution     melatonin 3 MG tablet     mineral oil enema     Multiple Vitamin (TAB-A-KATIA) TABS     omeprazole (PRILOSEC) 20 MG DR capsule     polyethylene glycol (MIRALAX/GLYCOLAX) packet     polyethylene glycol (MIRALAX/GLYCOLAX) packet     polyethylene glycol-propylene glycol (SYSTANE ULTRA) 0.4-0.3 % SOLN ophthalmic solution     ranitidine (ZANTAC) 150 MG tablet     sertraline (ZOLOFT) 25 MG tablet     Simethicone (GAS RELIEF) 125 MG CAPS     triamcinolone (KENALOG) 0.1 % external cream     vitamin D3 (CHOLECALCIFEROL) 2000 units (50 mcg) tablet     White Petrolatum (VASELINE) ointment     Allergies   Allergen Reactions     Penicillins Anaphylaxis, Rash and Shortness Of Breath     Aspirin Nausea     Atenolol Cough     Atorvastatin Muscle Pain (Myalgia)     Bupropion Nausea     Clindamycin Other (See Comments)     Constipation      Codeine Nausea     Ibuprofen Nausea      Stomach upset     Lovastatin Muscle Pain (Myalgia)     Cephalexin Rash     Neck reddness      Review of Systems   Constitutional: Positive for activity change. Negative for fever.   HENT: Negative for congestion and trouble swallowing.    Eyes: Negative for redness.   Respiratory: Negative for shortness of breath.    Cardiovascular: Negative for chest pain.   Gastrointestinal: Positive for abdominal pain, constipation and nausea. Negative for vomiting.   Genitourinary: Negative for difficulty urinating, dysuria and frequency.   Musculoskeletal: Positive for back pain. Negative for arthralgias and neck stiffness.   Skin: Negative for color change.   Allergic/Immunologic: Negative for immunocompromised state.   Neurological: Positive for weakness (general). Negative for headaches.   Psychiatric/Behavioral: Positive for decreased concentration and dysphoric mood. Negative for confusion, hallucinations and sleep disturbance.   All other systems reviewed and are negative.    Physical Exam   BP: (!) 143/77  Pulse: 77  Temp: 98  F (36.7  C)  Resp: 18  Weight: 68 kg (150 lb)  SpO2: 97 %    Physical Exam  Vitals signs and nursing note reviewed.   Constitutional:       General: She is in acute distress.      Appearance: She is well-developed. She is not diaphoretic.      Comments: Patient mild distress with family present nontoxic   HENT:      Head: Normocephalic and atraumatic.      Nose: Nose normal.      Mouth/Throat:      Mouth: Mucous membranes are moist.   Eyes:      General: No scleral icterus.     Extraocular Movements: Extraocular movements intact.      Conjunctiva/sclera: Conjunctivae normal.      Pupils: Pupils are equal, round, and reactive to light.   Neck:      Musculoskeletal: Normal range of motion and neck supple.   Cardiovascular:      Rate and Rhythm: Normal rate.   Pulmonary:      Breath sounds: Normal breath sounds.   Abdominal:      General: There is no distension.      Palpations: There is no mass.       Tenderness: There is abdominal tenderness. There is no guarding or rebound.   Skin:     General: Skin is warm and dry.      Capillary Refill: Capillary refill takes less than 2 seconds.      Coloration: Skin is not jaundiced or pale.      Findings: No erythema or rash.   Neurological:      General: No focal deficit present.      Mental Status: She is alert and oriented to person, place, and time.   Psychiatric:      Comments: Flat but appropriate         ED Course        Procedures         Patient valuated here in the ER.  IV established labs are drawn.  CBC chemistries urinalysis lipase etc. within normal limits.  Discussed with family and reassured at this point.  Patient had received IV fluids did receive her Sinemet also as she does have Parkinson's also.  She received her Neurontin also and omeprazole.  She did receive 0.2 mg of Dilaudid IV for pain which helped slightly.  Patient also did receive 1 Norco tablet also with improvement of symptoms.  Here in the ER we did do a plain x-ray did not show any free air etc.  We did do a CT scan of the lumbar spine along with the abdomen pelvis.  Status post cholecystectomy there is no sign of bowel obstruction pneumatosis free air etc.  Discussed with family etc. patient at this point is comfortable going home there is a moderate moderate stool noted on the CT.  We discussed as far as following up with GI this would be appropriate to get a referral as she is tried multiple different ways to manage her constipation recommended upping her MiraLAX also to follow-up with MD she should return if any concerns patient agrees to plan comfortably discharge.       Critical Care time:  none             Labs Ordered and Resulted from Time of ED Arrival Up to the Time of Departure from the ED   CBC WITH PLATELETS DIFFERENTIAL - Abnormal; Notable for the following components:       Result Value     (*)     MCHC 31.0 (*)     All other components within normal limits    COMPREHENSIVE METABOLIC PANEL - Abnormal; Notable for the following components:    Creatinine 0.45 (*)     Albumin 3.0 (*)     All other components within normal limits   ROUTINE UA WITH MICROSCOPIC - Abnormal; Notable for the following components:    pH Urine 7.5 (*)     Leukocyte Esterase Urine Small (*)     Mucous Urine Present (*)     All other components within normal limits   LIPASE         Results for orders placed or performed during the hospital encounter of 01/27/20   XR Abdomen 2 Views     Status: None    Narrative    XR ABDOMEN 2 VW  1/27/2020 4:19 PM      HISTORY: abd pain    COMPARISON: CT abdomen and pelvis 10/28/2019, 8/23/2019, abdominal  radiograph 3/17/2019, 3/11/2019    FINDINGS: Upright and supine abdominal radiographs. Abdominal aortic  and iliac endograft. Cholecystectomy clips. Nonobstructive bowel gas  pattern. No pneumatosis or portal venous gas. No pneumoperitoneum no  acute osseous abnormality. Status post right total hip arthroplasty.  Lung bases are clear.      Impression    IMPRESSION: No acute radiographic abnormality of the abdomen.    I have personally reviewed the examination and initial interpretation  and I agree with the findings.    NIMESH TAMAYO MD   CT Abdomen Pelvis w Contrast     Status: None    Narrative    EXAMINATION: CT ABDOMEN PELVIS W CONTRAST, 1/27/2020 7:31 PM    TECHNIQUE:  Helical CT images from the lung bases through the  symphysis pubis were obtained  with contrast. Contrast dose: iopamidol  (ISOVUE-370) solution 92 mL    COMPARISON: Radiograph same day, CT abdomen and pelvis 12/28/2019    HISTORY: ab pain ? bowel obstruction also low back pain and hx of  aortic iliac endograft. Please also eval Lumbar spine for any changes.    FINDINGS:    LUNG BASES: Normal heart size, no pericardial effusion. Lung bases are  clear.    ABDOMEN/PELVIS: Unremarkable hepatic parenchyma. Stable mild  intrahepatic biliary ductal dilatation. Portal vasculature is  widely  patent. Status post cholecystectomy. Stable prominent common bile duct  at 1.1 cm. Pancreas, spleen, and kidneys are unremarkable. Stable 2 cm  left adrenal nodule. Small right adrenal nodule. Normal caliber small  bowel. Moderate colonic stool burden. Scattered colonic diverticulosis  without acute diverticulitis. Appendix is not definitively identified.  Status post aortic endograft repair, graft appears widely patent. No  suspicious abdominal, mesenteric, retroperitoneal lymphadenopathy. No  suspicious pelvic mass, however evaluation of the pelvis suboptimal  secondary to beam hardening artifact. No concerning inguinal or pelvic  lymphadenopathy.    BONES: Status post right total hip arthroplasty with intramedullary  antelmo. Osteopenic appearance of the bones. No fractures or spine  abnormality. Levoscoliosis centered at L2.      Impression    IMPRESSION:  1. No acute radiographic abnormality in the abdomen or pelvis to  explain patient's symptoms.  2. Prominent common bile duct and intrahepatic ductal dilatation,  likely reservoir effect secondary to cholecystectomy, unchanged.  3. Moderate stool burden.  4. Unchanged adrenal nodules.     I have personally reviewed the examination and initial interpretation  and I agree with the findings.    SHANIA CURRY MD   CT Lumbar Spine w Contrast     Status: None    Narrative    CT LUMBAR SPINE W CONTRAST 1/27/2020 7:31 PM    History: lumbar pain evaluate for any ariel changes on the CT ab  ordered also..    Comparison: None available.    Technique: Using multidetector thin collimation helical acquisition  technique, axial, coronal and sagittal CT images through the lumbar  spine were obtained without intravenous contrast.     Findings: There are 5 lumbar type vertebrae. Severe osteopenic  appearance of the bones. Levoscoliosis centered at L3. Regarding  alignment, there is very mild anterolisthesis of L3 on L4.. There is  moderate-severe disc space narrowing  at L4-5 and L5 1. No definite  lesions are noted within the vertebra. Findings on a level by level  basis are as follows:    L1-L2: No spinal canal or neuroforaminal stenosis.    L2-L3: Broad-based disc bulge and facet arthropathy with mild right  neural foraminal stenosis. No substantial left neural foraminal  stenosis. Mild spinal canal stenosis.    L3-L4: Broad-based disc bulge with mild bilateral neural foraminal  stenosis. Mild spinal canal stenosis.    L4-L5: Bilateral facet arthropathy and broad-based disc bulge with  mild-moderate left and mild right neural foraminal stenosis. Mild  spinal canal stenosis.    L5-S1: Facet arthropathy and broad-based disc bulge with mild-moderate  left neural foraminal stenosis. No substantial right neural foraminal  or spinal canal stenosis.     Aortic endograft, better characterized on associated CT abdomen and  pelvis same date. Stable left adrenal nodule.      Impression    Impression:  1.  No acute radiographic abnormality in the lumbar spine. Multilevel  degenerative changes of the spine, greatest at L4-5 and L5-S1 as  above.  2.  Osteopenia.    I have personally reviewed the examination and initial interpretation  and I agree with the findings.    RODRÍGUEZ THOMAS MD   CBC with platelets differential     Status: Abnormal   Result Value Ref Range    WBC 6.1 4.0 - 11.0 10e9/L    RBC Count 4.29 3.8 - 5.2 10e12/L    Hemoglobin 13.7 11.7 - 15.7 g/dL    Hematocrit 44.2 35.0 - 47.0 %     (H) 78 - 100 fl    MCH 31.9 26.5 - 33.0 pg    MCHC 31.0 (L) 31.5 - 36.5 g/dL    RDW 11.9 10.0 - 15.0 %    Platelet Count 234 150 - 450 10e9/L    Diff Method Automated Method     % Neutrophils 59.2 %    % Lymphocytes 30.8 %    % Monocytes 7.5 %    % Eosinophils 1.5 %    % Basophils 0.8 %    % Immature Granulocytes 0.2 %    Nucleated RBCs 0 0 /100    Absolute Neutrophil 3.6 1.6 - 8.3 10e9/L    Absolute Lymphocytes 1.9 0.8 - 5.3 10e9/L    Absolute Monocytes 0.5 0.0 - 1.3 10e9/L    Absolute  Eosinophils 0.1 0.0 - 0.7 10e9/L    Absolute Basophils 0.1 0.0 - 0.2 10e9/L    Abs Immature Granulocytes 0.0 0 - 0.4 10e9/L    Absolute Nucleated RBC 0.0     Platelet Estimate Confirming automated cell count    Comprehensive metabolic panel     Status: Abnormal   Result Value Ref Range    Sodium 135 133 - 144 mmol/L    Potassium 3.6 3.4 - 5.3 mmol/L    Chloride 105 94 - 109 mmol/L    Carbon Dioxide 26 20 - 32 mmol/L    Anion Gap 3 3 - 14 mmol/L    Glucose 92 70 - 99 mg/dL    Urea Nitrogen 14 7 - 30 mg/dL    Creatinine 0.45 (L) 0.52 - 1.04 mg/dL    GFR Estimate >90 >60 mL/min/[1.73_m2]    GFR Estimate If Black >90 >60 mL/min/[1.73_m2]    Calcium 8.8 8.5 - 10.1 mg/dL    Bilirubin Total 0.4 0.2 - 1.3 mg/dL    Albumin 3.0 (L) 3.4 - 5.0 g/dL    Protein Total 7.5 6.8 - 8.8 g/dL    Alkaline Phosphatase 73 40 - 150 U/L    ALT 16 0 - 50 U/L    AST 21 0 - 45 U/L   UA with Microscopic     Status: Abnormal   Result Value Ref Range    Color Urine Light Yellow     Appearance Urine Clear     Glucose Urine Negative NEG^Negative mg/dL    Bilirubin Urine Negative NEG^Negative    Ketones Urine Negative NEG^Negative mg/dL    Specific Gravity Urine 1.008 1.003 - 1.035    Blood Urine Negative NEG^Negative    pH Urine 7.5 (H) 5.0 - 7.0 pH    Protein Albumin Urine Negative NEG^Negative mg/dL    Urobilinogen mg/dL Normal 0.0 - 2.0 mg/dL    Nitrite Urine Negative NEG^Negative    Leukocyte Esterase Urine Small (A) NEG^Negative    Source Unspecified Urine     WBC Urine 1 0 - 5 /HPF    RBC Urine 0 0 - 2 /HPF    Squamous Epithelial /HPF Urine 1 0 - 1 /HPF    Transitional Epi <1 0 - 1 /HPF    Mucous Urine Present (A) NEG^Negative /LPF   Lipase     Status: None   Result Value Ref Range    Lipase 98 73 - 393 U/L         Assessments & Plan (with Medical Decision Making)  80-year-old female presents with ongoing abdominal pain today has chronic back pain also.  Patient deals with constipation etc.  She had a cholecystectomy in the past before patient  was evaluated here in the ER we wanted to rule out obstruction as they recommended it per her clinic.  Abdominal film did not show any free air CT scan of the abdomen and lumbar spine did not show any obstruction new findings etc.  Some chronic degenerative changes in the back seen.  Patient did receive some IV fluids along with a small dose of Dilaudid and then 1 oral Norco.  Patient here in the ER otherwise was feeling better discussed with daughter at length feel at this point this is still an ongoing probably constipation issue which patient agrees declined an enema here in the ER.  Is comfortable going home will increase her MiraLAX follow-up with MD and get a GI referral and return if any concerns.         I have reviewed the nursing notes.    I have reviewed the findings, diagnosis, plan and need for follow up with the patient.  Discharge Medication List as of 1/27/2020  8:29 PM        Final diagnoses:   Slow transit constipation   Abdominal pain, generalized   DDD (degenerative disc disease), lumbar   I, Meliton Narayanan, am serving as a trained medical scribe to document services personally performed by Scott Zepeda MD, based on the provider's statements to me.   IScott MD, was physically present and have reviewed and verified the accuracy of this note documented by Meliton Narayanan.     1/27/2020   [facility], [place], EMERGENCY DEPARTMENT     Scott Zepeda MD  02/14/20 1046

## 2020-01-27 NOTE — ED TRIAGE NOTES
Patient BIBA from assisted living to triage c/o back and abdominal pain. Patient has chronic back pain, last took Norco at 1130 prior to arrival, which has helped with pain. Patient also c/o abdominal pain and states she may be constipated. LBM yesterday.

## 2020-01-27 NOTE — ED NOTES
ED Triage Provider Note  [facility]  Encounter Date: Jan 27, 2020    History:  No chief complaint on file.    Mehreen Camara is a 80 year old female who presents to the ED with orthostatic BP who presents to the ER for worsening back pain. Pt took norco which helped and now it hurts worse.  Pt also with abd pain.  Pt felt nauseated.  Pt notes hx of being constipated.  Has not had bowel movement today.  No vomiting.  Pt also had elevated BP so daughter became concerned.  Pt has a neurologist at [facility]     Review of Systems:  No vomiting.   Lives in assisted living    Exam:  There were no vitals taken for this visit.  General: No acute distress. Appears stated age.   Cardio: Regular rate, extremities well perfused  Resp: Normal work of breathing, grossly normal respiratory rate  Neuro: Alert. CN II-XII grossly intact. Grossly intact strength.       Medical Decision Making:  Patient arriving to the ED with problem as above. A medical screening exam was performed. Labs and urine orders initiated from Triage. The patient is appropriate to wait in triage.      Sharon Jara MD on 1/27/2020 at 2:44 PM       Sharon Jara MD  01/27/20 3312

## 2020-01-27 NOTE — ED AVS SNAPSHOT
[facility], [place], Emergency Department  [address] [place] [id number]  Phone:  [phone]                                    Mehreen Camara   MRN: [id number]    Department:  [facility], Emergency Department   Date of Visit:  1/27/2020           After Visit Summary Signature Page    I have received my discharge instructions, and my questions have been answered. I have discussed any challenges I see with this plan with the nurse or doctor.    ..........................................................................................................................................  Patient/Patient Representative Signature      ..........................................................................................................................................  Patient Representative Print Name and Relationship to Patient    ..................................................               ................................................  Date                                   Time    ..........................................................................................................................................  Reviewed by Signature/Title    ...................................................              ..............................................  Date                                               Time          22EPIC Rev 08/18

## 2020-01-28 NOTE — DISCHARGE INSTRUCTIONS
Home.  Your labs appear normal with CT scan showing primarily constipation without any sign of obstruction.  Urine does not show any infection.  CT of back without any fractures but general arthritic changes.  Place referral to GI clinic for ongoing recommendations for bowel regimen.  See MD for follow up.  Return if any concerns.    Please make an appointment to follow up with GI Clinic (phone: [phone]) as soon as possible.

## 2020-02-03 NOTE — PROGRESS NOTES
[facility]  [facility] Record Number:  [id number]  Place of Service where encounter took place:  [facility] ([facility]) [[id number]]  Chief Complaint   Patient presents with     RECHECK       HPI:    Mehreen Camara  is a 80 year old (1940), who is being seen today for an episodic care visit.  HPI information obtained from: facility chart records, facility staff, patient report and [facility] chart review. Today's concern is:     Abdominal pain, generalized  Chronic idiopathic constipation  Essential hypertension  Orthostatic hypotension dysautonomic syndrome (H)  Rupture of left Achilles tendon, sequela  Adjustment disorder with mixed anxiety and depressed mood      Mehreen reports she continues to have RLQ abdominal pain at baseline as well as confusion regarding the use of the L LE AFO she has.  Nursing reports she was seen recently by [facility], who recommended she could now wear the AFO prn for comfort.  She is unclear what this means, needs clarification.       Past Medical and Surgical History reviewed in Epic today.    MEDICATIONS:  Current Outpatient Medications   Medication Sig Dispense Refill     acetaminophen (TYLENOL) 500 MG tablet Take 500-1,000 mg by mouth 3 times daily as needed for mild pain       acetaminophen (TYLENOL) 500 MG tablet Take 1 tablet (500 mg) by mouth 3 times daily  0     betamethasone dipropionate (DIPROSONE) 0.05 % external cream Apply topically 2 times daily as needed       bisacodyl (DULCOLAX) 10 MG suppository Place 10 mg rectally daily as needed for constipation       BISACODYL PO Take 5 mg by mouth daily       calcium carbonate (TUMS) 500 MG chewable tablet Take 1 tablet (500 mg) by mouth every 2 hours as needed for heartburn 150 tablet 1     carbidopa-levodopa (SINEMET)  MG tablet Take 2 tablets by mouth 3 times daily 0700, 1230, 1830       diclofenac (VOLTAREN) 1 % topical gel Apply 4 g topically 4 times daily as needed for moderate pain  (to back and neck) 100 g 11     gabapentin (NEURONTIN) 100 MG capsule Take 200 mg by mouth daily (with dinner)       gabapentin (NEURONTIN) 100 MG capsule Take 300 mg by mouth 2 times daily AM and noon       hydrochlorothiazide (HYDRODIURIL) 25 MG tablet Take 1 tablet (25 mg) by mouth daily       HYDROcodone-acetaminophen (NORCO) 5-325 MG tablet TAKE 1 TABLET BY MOUTH TWICE DAILY (MAX APAP 4GM/24HRS);TAKE 1 TABLET BY MOUTH EVERY 4 HOURS AS NEEDED (MAX APAP 4GM/24HRS) 100 tablet 0     levothyroxine (SYNTHROID/LEVOTHROID) 137 MCG tablet TAKE 1 TABLET BY MOUTH EVERY DAY DX HYPOTHYROIDISM 30 tablet 11     losartan (COZAAR) 50 MG tablet TAKE TABLET BY MOUTH EVERY MORNING 30 tablet 11     magnesium citrate 1.745 GM/30ML solution Take 296 mLs by mouth daily as needed for other       melatonin 3 MG tablet Take 1 tablet (3 mg) by mouth At Bedtime       mineral oil enema Place 1 enema rectally as needed for constipation 1 enema 0     Multiple Vitamin (TAB-A-KATIA) TABS TAKE 1 TABLET BY MOUTH EVERY DAY FOR SUPPLEMENT, WEIGHT LOSS 100 tablet 11     omeprazole (PRILOSEC) 20 MG DR capsule TAKE 1 CAPSULE BY MOUTH TWICE DAILY DX GASTROESOPHAGEAL REFLUX DISEASE 60 capsule 11     polyethylene glycol (MIRALAX/GLYCOLAX) packet Take 17 g by mouth daily       polyethylene glycol (MIRALAX/GLYCOLAX) packet Take 17 g by mouth daily as needed for constipation       polyethylene glycol-propylene glycol (SYSTANE ULTRA) 0.4-0.3 % SOLN ophthalmic solution Place 2 drops into both eyes 4 times daily as needed for dry eyes       ranitidine (ZANTAC) 150 MG tablet TAKE 1 TABLET BY MOUTH DAILY AT BEDTIME DX GASTROESOPHAGEAL REFLUX DISEASE 30 tablet 11     sertraline (ZOLOFT) 25 MG tablet Take 3 tablets (75 mg) by mouth At Bedtime 60 tablet 0     Simethicone (GAS RELIEF) 125 MG CAPS Take 125 mg by mouth 4 times daily as needed (flatulence/gas)       triamcinolone (KENALOG) 0.1 % external cream Apply topically 2 times daily as needed for irritation        vitamin D3 (CHOLECALCIFEROL) 2000 units (50 mcg) tablet Take 2,000 Units by mouth daily       White Petrolatum (VASELINE) ointment Apply topically 3 times daily Apply to vulva 2-3 times a day 0700, 1200, 1830       REVIEW OF SYSTEMS:  4 point ROS including Respiratory, CV, GI and , other than that noted in the HPI,  is negative    Objective:  BP (!) 143/77   Pulse 78   Temp 98  F (36.7  C)   Resp 16   Wt 71.2 kg (157 lb)   SpO2 97%   BMI 25.34 kg/m    Exam:  GENERAL APPEARANCE:  Alert, in no acute distress , ambulating about her apartment easily.   HEAD:  Normal, normocephalic, atraumatic  ENT:  Mouth and posterior oropharynx normal, moist mucous membranes, hearing acuity - within normal limits   EYE EXAM:  EOM, conjunctivae, lids, pupils and irises normal   CHEST/RESP:  respiratory effort normal, no respiratory distress, lung sounds CTA    CV:  Rate and rhythm reg, no murmur/rub/gallop, no peripheral edema  M/S:   extremities normal, gait abnormal-using L AFT, slight limp, unsteady on feet, digits and nails within normal limits   NEUROLOGIC EXAM: Normal gross motor movement, tone and coordination. No tremor. Cranial nerves 2-12 are normal tested and grossly at patient's baseline  PSYCH:  Alert and oriented to person-place-time with anxiety, affect anxious     Labs:   Recent labs in [facility] reviewed by me today.     ASSESSMENT/PLAN:  Abdominal pain, generalized  Chronic idiopathic constipation  Patient with chronic abdominal pain, constipation.  She reports she has used mag citrate about 4 times since last ED visit on 1/27/20, notes she has had several BMs but always feels full, wonders why she can never empty completely.  Reviewed that new waste is created every time she eats. She reports that she thinks the colace she is on is causing increased perineal irritation, change in the color of the stools, reports this was a problem for her in the past as well. She reports ongoing pain is worse since decrease of  gabapentin and she would like to take more gabapentin during the day  -increase gabapentin to 300/300/200 and assess ongoing pain control   -discontinue colace  -start bisacodyl po daily  -continue miralax and mag citrate per current orders    Essential hypertension  Orthostatic hypotension dysautonomic syndrome (H)  Continues to have widely ranging BP, often dropping very low, followed closely by neurology.  On losartan, hydrochlorothiazide.  The current medical regimen is effective;  continue present plan and medications.   BP Readings from Last 3 Encounters:   02/04/20 (!) 143/77   01/27/20 138/72   01/14/20 (!) 161/79        Rupture of left Achilles tendon, sequela  She has completed follow up with [facility], regarding L Achilles tendon complete rupture.  She has L AFO.  At last visit to [place], on 1/30/20, she was given permission to use AFO prn.  She has not been without the AFO, would like to be able to walk without it in her apartment, but is afraid.   -therapy to re-evaluate for increasing tolerance of ambulation OUT of L AFO, transfer to bathroom/shower, ambulation short distances.  -see F2F done today.     Adjustment disorder with mixed anxiety and depressed mood  Continues to be anxious but redirectable. On medications as noted in medication list above.       Orders written by provider at facility and transcribed by : Te Sargent  1. Increase Gabapentin to 300 am -300 noon 200 supper, Dx: Nerve pain  2. discontinue Colace (she thinks it causes perineal rash)  3. Bisacodyl 5 mg tab every day PO Dx: Constipation  4. Cough Drops (OTC) May keep at bedside and self administer for cough/congestion  5. PT/OT to eval and treat for ambulation, balance, gradually Wean L AFO as tolerated Dx; wound pressure to use only for long distances (off at night. Showers in apartment)    Electronically signed by:  HAILEE Contreras CNP           Documentation of Face-to-Face and Certification for Home Health  Services     Patient: Mehreen Camara   YOB: 1940  MR Number: [id number]  Today's Date: 2/4/2020    I certify that patient: Mehreen Camara is under my care and that I, or a nurse practitioner or physician's assistant working with me, had a face-to-face encounter that meets the physician face-to-face encounter requirements with this patient on: 2/4/2020.    This encounter with the patient was in whole, or in part, for the following medical condition, which is the primary reason for home health care:   Rupture of L achilles tendon S86.012S, F43.23.    I certify that, based on my findings, the following services are medically necessary home health services: Occupational Therapy and Physical Therapy.    My clinical findings support the need for the above services because: Occupational Therapy Services are needed to assess and treat cognitive ability and address ADL safety due to impairment in balance, ADLs . and Physical Therapy Services are needed to assess and treat the following functional impairments: balance, mobility .    Further, I certify that my clinical findings support that this patient is homebound (i.e. absences from home require considerable and taxing effort and are for medical reasons or [employer] services or infrequently or of short duration when for other reasons) because: Requires assistance of another person or specialized equipment to access medical services because patient: Requires supervision of another for safe transfer...    Based on the above findings. I certify that this patient is confined to the home and needs intermittent skilled nursing care, physical therapy and/or speech therapy.  The patient is under my care, and I have initiated the establishment of the plan of care.  This patient will be followed by a physician who will periodically review the plan of care.  Physician/Provider to provide follow up care: Cathy Sargent    Responsible Medicare certified [place]  Physician: HAILEE Contreras CNP   Physician Signature: See electronic signature associated with these discharge orders.  Date: 2/4/2020

## 2020-02-04 ENCOUNTER — ASSISTED LIVING VISIT (OUTPATIENT)
Dept: GERIATRICS | Facility: CLINIC | Age: 80
End: 2020-02-04
Payer: COMMERCIAL

## 2020-02-04 ENCOUNTER — DOCUMENTATION ONLY (OUTPATIENT)
Dept: NEUROLOGY | Facility: CLINIC | Age: 80
End: 2020-02-04

## 2020-02-04 VITALS
OXYGEN SATURATION: 97 % | SYSTOLIC BLOOD PRESSURE: 143 MMHG | HEART RATE: 78 BPM | RESPIRATION RATE: 16 BRPM | DIASTOLIC BLOOD PRESSURE: 77 MMHG | BODY MASS INDEX: 25.34 KG/M2 | TEMPERATURE: 98 F | WEIGHT: 157 LBS

## 2020-02-04 DIAGNOSIS — I10 ESSENTIAL HYPERTENSION: ICD-10-CM

## 2020-02-04 DIAGNOSIS — S86.012S RUPTURE OF LEFT ACHILLES TENDON, SEQUELA: ICD-10-CM

## 2020-02-04 DIAGNOSIS — F43.23 ADJUSTMENT DISORDER WITH MIXED ANXIETY AND DEPRESSED MOOD: ICD-10-CM

## 2020-02-04 DIAGNOSIS — R10.84 ABDOMINAL PAIN, GENERALIZED: Primary | ICD-10-CM

## 2020-02-04 DIAGNOSIS — K59.04 CHRONIC IDIOPATHIC CONSTIPATION: ICD-10-CM

## 2020-02-04 DIAGNOSIS — I95.1 ORTHOSTATIC HYPOTENSION DYSAUTONOMIC SYNDROME: ICD-10-CM

## 2020-02-04 RX ORDER — BISACODYL 10 MG
10 SUPPOSITORY, RECTAL RECTAL DAILY PRN
COMMUNITY
End: 2020-06-17

## 2020-02-04 RX ORDER — GABAPENTIN 100 MG/1
200 CAPSULE ORAL
COMMUNITY
End: 2020-05-22

## 2020-02-04 RX ORDER — CHOLECALCIFEROL (VITAMIN D3) 50 MCG
2000 TABLET ORAL DAILY
Status: ON HOLD | COMMUNITY
End: 2020-10-12

## 2020-02-04 NOTE — PROGRESS NOTES
Patients Blood Pressure from [facility] has been received and placed in providers folder for review.    Received form back signed by provider, forms was fax to [phone], sent to be scanned

## 2020-02-04 NOTE — LETTER
2/4/2020        RE: Mehreen Glass  [address]  [facility] [address]        [facility]  [facility] Record Number:  [id number]  Place of Service where encounter took place:  [facility] [[id number]]  Chief Complaint   Patient presents with     RECHECK       HPI:    Mehreen Camara  is a 80 year old (1940), who is being seen today for an episodic care visit.  HPI information obtained from: facility chart records, facility staff, patient report and [facility] chart review. Today's concern is:     Abdominal pain, generalized  Chronic idiopathic constipation  Essential hypertension  Orthostatic hypotension dysautonomic syndrome (H)  Rupture of left Achilles tendon, sequela  Adjustment disorder with mixed anxiety and depressed mood      Mehreen reports she continues to have RLQ abdominal pain at baseline as well as confusion regarding the use of the L LE AFO she has.  Nursing reports she was seen recently by [facility], who recommended she could now wear the AFO prn for comfort.  She is unclear what this means, needs clarification.       Past Medical and Surgical History reviewed in Epic today.    MEDICATIONS:  Current Outpatient Medications   Medication Sig Dispense Refill     acetaminophen (TYLENOL) 500 MG tablet Take 500-1,000 mg by mouth 3 times daily as needed for mild pain       acetaminophen (TYLENOL) 500 MG tablet Take 1 tablet (500 mg) by mouth 3 times daily  0     betamethasone dipropionate (DIPROSONE) 0.05 % external cream Apply topically 2 times daily as needed       bisacodyl (DULCOLAX) 10 MG suppository Place 10 mg rectally daily as needed for constipation       BISACODYL PO Take 5 mg by mouth daily       calcium carbonate (TUMS) 500 MG chewable tablet Take 1 tablet (500 mg) by mouth every 2 hours as needed for heartburn 150 tablet 1     carbidopa-levodopa (SINEMET)  MG tablet Take 2 tablets by mouth 3 times daily 0700,  1230, 1830       diclofenac (VOLTAREN) 1 % topical gel Apply 4 g topically 4 times daily as needed for moderate pain (to back and neck) 100 g 11     gabapentin (NEURONTIN) 100 MG capsule Take 200 mg by mouth daily (with dinner)       gabapentin (NEURONTIN) 100 MG capsule Take 300 mg by mouth 2 times daily AM and noon       hydrochlorothiazide (HYDRODIURIL) 25 MG tablet Take 1 tablet (25 mg) by mouth daily       HYDROcodone-acetaminophen (NORCO) 5-325 MG tablet TAKE 1 TABLET BY MOUTH TWICE DAILY (MAX APAP 4GM/24HRS);TAKE 1 TABLET BY MOUTH EVERY 4 HOURS AS NEEDED (MAX APAP 4GM/24HRS) 100 tablet 0     levothyroxine (SYNTHROID/LEVOTHROID) 137 MCG tablet TAKE 1 TABLET BY MOUTH EVERY DAY DX HYPOTHYROIDISM 30 tablet 11     losartan (COZAAR) 50 MG tablet TAKE TABLET BY MOUTH EVERY MORNING 30 tablet 11     magnesium citrate 1.745 GM/30ML solution Take 296 mLs by mouth daily as needed for other       melatonin 3 MG tablet Take 1 tablet (3 mg) by mouth At Bedtime       mineral oil enema Place 1 enema rectally as needed for constipation 1 enema 0     Multiple Vitamin (TAB-A-KATIA) TABS TAKE 1 TABLET BY MOUTH EVERY DAY FOR SUPPLEMENT, WEIGHT LOSS 100 tablet 11     omeprazole (PRILOSEC) 20 MG DR capsule TAKE 1 CAPSULE BY MOUTH TWICE DAILY DX GASTROESOPHAGEAL REFLUX DISEASE 60 capsule 11     polyethylene glycol (MIRALAX/GLYCOLAX) packet Take 17 g by mouth daily       polyethylene glycol (MIRALAX/GLYCOLAX) packet Take 17 g by mouth daily as needed for constipation       polyethylene glycol-propylene glycol (SYSTANE ULTRA) 0.4-0.3 % SOLN ophthalmic solution Place 2 drops into both eyes 4 times daily as needed for dry eyes       ranitidine (ZANTAC) 150 MG tablet TAKE 1 TABLET BY MOUTH DAILY AT BEDTIME DX GASTROESOPHAGEAL REFLUX DISEASE 30 tablet 11     sertraline (ZOLOFT) 25 MG tablet Take 3 tablets (75 mg) by mouth At Bedtime 60 tablet 0     Simethicone (GAS RELIEF) 125 MG CAPS Take 125 mg by mouth 4 times daily as needed  (flatulence/gas)       triamcinolone (KENALOG) 0.1 % external cream Apply topically 2 times daily as needed for irritation       vitamin D3 (CHOLECALCIFEROL) 2000 units (50 mcg) tablet Take 2,000 Units by mouth daily       White Petrolatum (VASELINE) ointment Apply topically 3 times daily Apply to vulva 2-3 times a day 0700, 1200, 1830       REVIEW OF SYSTEMS:  4 point ROS including Respiratory, CV, GI and , other than that noted in the HPI,  is negative    Objective:  BP (!) 143/77   Pulse 78   Temp 98  F (36.7  C)   Resp 16   Wt 71.2 kg (157 lb)   SpO2 97%   BMI 25.34 kg/m     Exam:  GENERAL APPEARANCE:  Alert, in no acute distress , ambulating about her apartment easily.   HEAD:  Normal, normocephalic, atraumatic  ENT:  Mouth and posterior oropharynx normal, moist mucous membranes, hearing acuity - within normal limits   EYE EXAM:  EOM, conjunctivae, lids, pupils and irises normal   CHEST/RESP:  respiratory effort normal, no respiratory distress, lung sounds CTA    CV:  Rate and rhythm reg, no murmur/rub/gallop, no peripheral edema  M/S:   extremities normal, gait abnormal-using L AFT, slight limp, unsteady on feet, digits and nails within normal limits   NEUROLOGIC EXAM: Normal gross motor movement, tone and coordination. No tremor. Cranial nerves 2-12 are normal tested and grossly at patient's baseline  PSYCH:  Alert and oriented to person-place-time with anxiety, affect anxious     Labs:   Recent labs in [facility] reviewed by me today.     ASSESSMENT/PLAN:  Abdominal pain, generalized  Chronic idiopathic constipation  Patient with chronic abdominal pain, constipation.  She reports she has used mag citrate about 4 times since last ED visit on 1/27/20, notes she has had several BMs but always feels full, wonders why she can never empty completely.  Reviewed that new waste is created every time she eats. She reports that she thinks the colace she is on is causing increased perineal irritation, change in the  color of the stools, reports this was a problem for her in the past as well. She reports ongoing pain is worse since decrease of gabapentin and she would like to take more gabapentin during the day  -increase gabapentin to 300/300/200 and assess ongoing pain control   -discontinue colace  -start bisacodyl po daily  -continue miralax and mag citrate per current orders    Essential hypertension  Orthostatic hypotension dysautonomic syndrome (H)  Continues to have widely ranging BP, often dropping very low, followed closely by neurology.  On losartan, hydrochlorothiazide.  The current medical regimen is effective;  continue present plan and medications.   BP Readings from Last 3 Encounters:   02/04/20 (!) 143/77   01/27/20 138/72   01/14/20 (!) 161/79        Rupture of left Achilles tendon, sequela  She has completed follow up with [facility], regarding L Achilles tendon complete rupture.  She has L AFO.  At last visit to [place], on 1/30/20, she was given permission to use AFO prn.  She has not been without the AFO, would like to be able to walk without it in her apartment, but is afraid.   -therapy to re-evaluate for increasing tolerance of ambulation OUT of L AFO, transfer to bathroom/shower, ambulation short distances.  -see F2F done today.     Adjustment disorder with mixed anxiety and depressed mood  Continues to be anxious but redirectable. On medications as noted in medication list above.       Orders written by provider at facility and transcribed by : Te Sargent  1. Increase Gabapentin to 300 am -300 noon 200 supper, Dx: Nerve pain  2. discontinue Colace (she thinks it causes perineal rash)  3. Bisacodyl 5 mg tab every day PO Dx: Constipation  4. Cough Drops (OTC) May keep at bedside and self administer for cough/congestion  5. PT/OT to eval and treat for ambulation, balance, gradually Wean L AFO as tolerated Dx; wound pressure to use only for long distances (off at night. Showers in  apartment)    Electronically signed by:  HAILEE Contreras CNP           Documentation of Face-to-Face and Certification for Home Health Services     Patient: Mehreen Camara   YOB: 1940  MR Number: [id number]  Today's Date: 2/4/2020    I certify that patient: Mehreen Camara is under my care and that I, or a nurse practitioner or physician's assistant working with me, had a face-to-face encounter that meets the physician face-to-face encounter requirements with this patient on: 2/4/2020.    This encounter with the patient was in whole, or in part, for the following medical condition, which is the primary reason for home health care:   Rupture of L achilles tendon S86.012S, F43.23.    I certify that, based on my findings, the following services are medically necessary home health services: Occupational Therapy and Physical Therapy.    My clinical findings support the need for the above services because: Occupational Therapy Services are needed to assess and treat cognitive ability and address ADL safety due to impairment in balance, ADLs . and Physical Therapy Services are needed to assess and treat the following functional impairments: balance, mobility .    Further, I certify that my clinical findings support that this patient is homebound (i.e. absences from home require considerable and taxing effort and are for medical reasons or [employer] services or infrequently or of short duration when for other reasons) because: Requires assistance of another person or specialized equipment to access medical services because patient: Requires supervision of another for safe transfer...    Based on the above findings. I certify that this patient is confined to the home and needs intermittent skilled nursing care, physical therapy and/or speech therapy.  The patient is under my care, and I have initiated the establishment of the plan of care.  This patient will be followed by a physician who will  periodically review the plan of care.  Physician/Provider to provide follow up care: Cathy Sargent Medicare certified [employer] Physician: HAILEE Contreras CNP   Physician Signature: See electronic signature associated with these discharge orders.  Date: 2/4/2020          Sincerely,        HAILEE Contreras CNP

## 2020-02-07 ENCOUNTER — MEDICAL CORRESPONDENCE (OUTPATIENT)
Dept: HEALTH INFORMATION MANAGEMENT | Facility: CLINIC | Age: 80
End: 2020-02-07

## 2020-02-14 ENCOUNTER — DOCUMENTATION ONLY (OUTPATIENT)
Dept: NEUROLOGY | Facility: CLINIC | Age: 80
End: 2020-02-14

## 2020-02-14 ASSESSMENT — ENCOUNTER SYMPTOMS
HALLUCINATIONS: 0
ACTIVITY CHANGE: 1
WEAKNESS: 1
DYSURIA: 0
SLEEP DISTURBANCE: 0
FREQUENCY: 0
DYSPHORIC MOOD: 1
DECREASED CONCENTRATION: 1
TROUBLE SWALLOWING: 0

## 2020-02-14 NOTE — PROGRESS NOTES
BP record received from Provider. It has been reviewed and signed. It has been faxed to   [phone]. It has also been scanned to chart        Received BP records, form was placed in provider folder for signature

## 2020-02-16 ENCOUNTER — HEALTH MAINTENANCE LETTER (OUTPATIENT)
Age: 80
End: 2020-02-16

## 2020-03-02 NOTE — PROGRESS NOTES
"[facility]  [place] Medical Record Number:  [id number]  Place of Service where encounter took place:  [facility] ([facility]) [[id number]]  Chief Complaint   Patient presents with     RECHECK       HPI:    Mehreen Camara  is a 80 year old (1940), who is being seen today for an episodic care visit.  HPI information obtained from: facility chart records, facility staff, patient report and [facility] chart review. Today's concern is:     Orthostatic hypotension dysautonomic syndrome (H)  Multiple system atrophy (H)  Adjustment disorder with mixed anxiety and depressed mood  Episode of recurrent major depressive disorder, unspecified depression episode severity (H)  Abdominal pain, generalized  Chronic idiopathic constipation     Mehreen reports she continues to have abdominal pain, difficulty with bowels-either a \"blow-out\" with several large BMs, or no BM x several days. She reports she had a large blow out on Sunday, nursing notes she took mag citrate on Saturday and on Sunday but she does not recall taking this 2 days in a row.  She reports she is not happy with how the movantik and the linzness make her feel-she has been refusing the movantik.  She has complaints of ongoing abdominal cramping and pain, excessive gas.  Her daughter has a call in to the GI specialist to review next steps as she does not want to take the movantik.  He contacted nursing to request a flat plate xray prior to any decisions being made about changes in medications.   Nursing reports Mehreen is eating a lot of popcorn, this may be impacting bowels as well.  They report her memory issues are causing her to forget some things, question how accurate her reporting is, and what else she is taking/eating in her apartment that could be contributory.         Mehreen also continues to have significant anxiety, continues to wear the AFO on her L ankle all the time, even though she was told she could wean off it.  She is not " currently getting therapy.     Mehreen is also worried about her L shoulder, reports she is seeing [facility] for this and may have surgery on it.  If surgery is planned, she should see neurology  Prior to this to get their input.     Past Medical and Surgical History reviewed in Epic today.    MEDICATIONS:    Current Outpatient Medications   Medication Sig Dispense Refill     acetaminophen (TYLENOL) 500 MG tablet Take 500-1,000 mg by mouth 3 times daily as needed for mild pain       acetaminophen (TYLENOL) 500 MG tablet Take 1 tablet (500 mg) by mouth 3 times daily  0     betamethasone dipropionate (DIPROSONE) 0.05 % external cream Apply topically 2 times daily as needed       bisacodyl (DULCOLAX) 10 MG suppository Place 10 mg rectally daily as needed for constipation       BISACODYL PO Take 5 mg by mouth daily       calcium carbonate (TUMS) 500 MG chewable tablet Take 1 tablet (500 mg) by mouth every 2 hours as needed for heartburn 150 tablet 1     carbidopa-levodopa (SINEMET)  MG tablet Take 2 tablets by mouth 3 times daily 0700, 1230, 1830       clindamycin (CLEOCIN) 300 MG capsule Take 600 mg by mouth as needed Give 1 hour prior to dental appoitments       diclofenac (VOLTAREN) 1 % topical gel Apply 4 g topically 4 times daily as needed for moderate pain (to back and neck) 100 g 11     gabapentin (NEURONTIN) 100 MG capsule Take 200 mg by mouth daily (with dinner)       gabapentin (NEURONTIN) 100 MG capsule Take 300 mg by mouth 2 times daily AM and noon       hydrochlorothiazide (HYDRODIURIL) 25 MG tablet Take 1 tablet (25 mg) by mouth daily       HYDROcodone-acetaminophen (NORCO) 5-325 MG tablet TAKE 1 TABLET BY MOUTH TWICE DAILY (MAX APAP 4GM/24HRS);TAKE 1 TABLET BY MOUTH EVERY 4 HOURS AS NEEDED (MAX APAP 4GM/24HRS) 100 tablet 0     levothyroxine (SYNTHROID/LEVOTHROID) 137 MCG tablet TAKE 1 TABLET BY MOUTH EVERY DAY DX HYPOTHYROIDISM 30 tablet 11     linaclotide (LINZESS) 290 MCG capsule Take 290 mcg by  mouth every morning (before breakfast)       losartan (COZAAR) 50 MG tablet TAKE TABLET BY MOUTH EVERY MORNING 30 tablet 11     magnesium citrate 1.745 GM/30ML solution Take 296 mLs by mouth daily as needed for other       melatonin 3 MG tablet Take 1 tablet (3 mg) by mouth At Bedtime       mineral oil enema Place 1 enema rectally as needed for constipation 1 enema 0     Multiple Vitamin (TAB-A-KATIA) TABS TAKE 1 TABLET BY MOUTH EVERY DAY FOR SUPPLEMENT, WEIGHT LOSS 100 tablet 11     naloxegol 25 MG TABS tablet Take 25 mg by mouth every morning (before breakfast)       pantoprazole (PROTONIX) 20 MG EC tablet Take 20 mg by mouth daily       polyethylene glycol (MIRALAX/GLYCOLAX) packet Take 17 g by mouth 2 times daily        polyethylene glycol (MIRALAX/GLYCOLAX) packet Take 17 g by mouth daily as needed for constipation       polyethylene glycol-propylene glycol (SYSTANE ULTRA) 0.4-0.3 % SOLN ophthalmic solution Place 2 drops into both eyes 4 times daily as needed for dry eyes       ranitidine (ZANTAC) 150 MG tablet TAKE 1 TABLET BY MOUTH DAILY AT BEDTIME DX GASTROESOPHAGEAL REFLUX DISEASE 30 tablet 11     sertraline (ZOLOFT) 25 MG tablet Take 3 tablets (75 mg) by mouth At Bedtime 60 tablet 0     Simethicone (GAS RELIEF) 125 MG CAPS Take 125 mg by mouth 4 times daily as needed (flatulence/gas)       triamcinolone (KENALOG) 0.1 % external cream Apply topically 2 times daily as needed for irritation       vitamin D3 (CHOLECALCIFEROL) 2000 units (50 mcg) tablet Take 2,000 Units by mouth daily       White Petrolatum (VASELINE) ointment Apply topically 3 times daily Apply to vulva 2-3 times a day 0700, 1200, 1830       REVIEW OF SYSTEMS:  Mehreen OBRIEN Jax complains of chronic  pain, located low back, abdomen, shoulders, no chest pain or pressure, no acute shortness of breath. Sleep is fair-awakens frequently to urinate, appetite fair. Mehreen CAMILLE Tylershadia does complain of bowel changes, as described above. S/he does not  complain of bladder changes.  She has complaints of burning perineal pain, is using vaseline now but nothing else, reports he does not have betamethasone for BID use in her apartment.  All other systems reviewed and are negative unless otherwise noted in HPI.     Objective:  /79   Pulse 77   Temp 97.5  F (36.4  C)   Resp 18   Wt 71.8 kg (158 lb 6.4 oz)   SpO2 93%   BMI 25.57 kg/m    Exam:  GENERAL APPEARANCE:  Alert, in no physical distress , anxious and repetitive thoughts  HEAD:  Normal, normocephalic, atraumatic  ENT:  Mouth and posterior oropharynx normal, moist mucous membranes, hearing acuity - within normal limits   EYE EXAM:  EOM, conjunctivae, lids, pupils and irises normal   CHEST/RESP:  respiratory effort normal, no respiratory distress, lung sounds CTA    CV:  Rate and rhythm reg, no murmur/rub/gallop, trace peripheral edema  M/S:   extremities normal, gait wormal-walking with rolling walker or independently in her apartment, digits and nails within normal limits   NEUROLOGIC EXAM: Normal gross motor movement, tone and coordination. No tremor. Cranial nerves 2-12 are normal tested and grossly at patient's baseline  PSYCH:  Alert and oriented to person-place-time, affect anxious, repetitive thoughts, confused at times     Labs:   Recent labs in EPIC reviewed by me today.     ASSESSMENT/PLAN:  Orthostatic hypotension dysautonomic syndrome (H)  Multiple system atrophy (H)  She continues to have wide swings in her BP, apparently fell as low as 88/49 at orthopedic office.  She reports some dizziness at times, but cannot relate it to when her  BP is high or low.  She verbalizes and demonstrates understanding of need to change positions slowly. Is followed closely by neurology  BP Readings from Last 3 Encounters:   03/03/20 138/79   02/04/20 (!) 143/77   01/27/20 138/72      Episode of recurrent major depressive disorder, unspecified depression episode severity (H)  Adjustment disorder with mixed  "anxiety and depressed mood  She continues to have significant anxiety and depression, repetitive thoughts, and some noted memory loss.  On sertraline, which has been helpful. Overall, somewhat less circular thinking, less tremors and shaking, sometimes able to smile.     Abdominal pain, generalized  Chronic idiopathic constipation  She continues with abdominal pain, cramping, excessive gas.  She has some constipation but then some \"blow-outs\".  Nursing and family report some concerns about her memory, concerns that she is taking more medications than she should or reporting is not accurate.  It is also noted that she eats popcorn frequently - this could impact her bowel routine. She and her family are working closely with GI, abdominal xray planned for today, to assess.  She should continue to take prescribed medications.       Orders written by provider at facility and transcribed by : Te Sargent  1. discontinue Boudreauxs butt paste-not using, she reports it burns her skin  2. please Refill betamethasone cream and assist her in applying it BID til clear (to perineal area)      Electronically signed by:  HAILEE Contreras CNP           "

## 2020-03-03 ENCOUNTER — ASSISTED LIVING VISIT (OUTPATIENT)
Dept: GERIATRICS | Facility: CLINIC | Age: 80
End: 2020-03-03
Payer: COMMERCIAL

## 2020-03-03 VITALS
OXYGEN SATURATION: 93 % | TEMPERATURE: 97.5 F | RESPIRATION RATE: 18 BRPM | WEIGHT: 158.4 LBS | DIASTOLIC BLOOD PRESSURE: 79 MMHG | SYSTOLIC BLOOD PRESSURE: 138 MMHG | HEART RATE: 77 BPM | BODY MASS INDEX: 25.57 KG/M2

## 2020-03-03 DIAGNOSIS — F33.9 EPISODE OF RECURRENT MAJOR DEPRESSIVE DISORDER, UNSPECIFIED DEPRESSION EPISODE SEVERITY (H): ICD-10-CM

## 2020-03-03 DIAGNOSIS — I95.1 ORTHOSTATIC HYPOTENSION DYSAUTONOMIC SYNDROME: Primary | ICD-10-CM

## 2020-03-03 DIAGNOSIS — K59.04 CHRONIC IDIOPATHIC CONSTIPATION: ICD-10-CM

## 2020-03-03 DIAGNOSIS — R10.84 ABDOMINAL PAIN, GENERALIZED: ICD-10-CM

## 2020-03-03 DIAGNOSIS — G90.3 MULTIPLE SYSTEM ATROPHY (H): ICD-10-CM

## 2020-03-03 DIAGNOSIS — F43.23 ADJUSTMENT DISORDER WITH MIXED ANXIETY AND DEPRESSED MOOD: ICD-10-CM

## 2020-03-03 DIAGNOSIS — G23.8 MULTIPLE SYSTEM ATROPHY (H): ICD-10-CM

## 2020-03-03 RX ORDER — CLINDAMYCIN HCL 300 MG
600 CAPSULE ORAL PRN
COMMUNITY
End: 2020-10-23

## 2020-03-03 RX ORDER — PANTOPRAZOLE SODIUM 20 MG/1
20 TABLET, DELAYED RELEASE ORAL
COMMUNITY
End: 2020-10-23

## 2020-03-06 ENCOUNTER — DOCUMENTATION ONLY (OUTPATIENT)
Dept: NEUROLOGY | Facility: CLINIC | Age: 80
End: 2020-03-06

## 2020-03-06 ENCOUNTER — RECORDS - HEALTHEAST (OUTPATIENT)
Dept: LAB | Facility: CLINIC | Age: 80
End: 2020-03-06

## 2020-03-06 ENCOUNTER — MEDICAL CORRESPONDENCE (OUTPATIENT)
Dept: HEALTH INFORMATION MANAGEMENT | Facility: CLINIC | Age: 80
End: 2020-03-06

## 2020-03-06 LAB
MAGNESIUM SERPL-MCNC: 1.8 MG/DL (ref 1.8–2.6)
POTASSIUM BLD-SCNC: 3.7 MMOL/L (ref 3.5–5)

## 2020-03-06 NOTE — PROGRESS NOTES
Received BP records from [facility]  Records Date of service: 3/6/20  Copy has been sent to scanning and encounter routed to specialty nurse for review. FYI was placed in provider     Signed BP forms was fax to [phone]

## 2020-03-13 ENCOUNTER — MEDICAL CORRESPONDENCE (OUTPATIENT)
Dept: HEALTH INFORMATION MANAGEMENT | Facility: CLINIC | Age: 80
End: 2020-03-13

## 2020-03-13 ENCOUNTER — DOCUMENTATION ONLY (OUTPATIENT)
Dept: NEUROLOGY | Facility: CLINIC | Age: 80
End: 2020-03-13

## 2020-03-13 NOTE — PROGRESS NOTES
Received BP log,forms wads placed in provider folder for signature    Received Bp form back form was fax to [phone], sent to be scanned

## 2020-03-19 ENCOUNTER — ASSISTED LIVING VISIT (OUTPATIENT)
Dept: GERIATRICS | Facility: CLINIC | Age: 80
End: 2020-03-19
Payer: COMMERCIAL

## 2020-03-19 DIAGNOSIS — Z71.89 ACP (ADVANCE CARE PLANNING): Primary | ICD-10-CM

## 2020-03-19 NOTE — PROGRESS NOTES
[facility]  [place] Medical Record Number:  [id number]  Place of Service where encounter took place:  [facility] ([facility]) [[id number]]  Chief Complaint   Patient presents with     Care Plan       HPI:    Mehreen Camara  is a 80 year old (1940), who is being seen today for an episodic care visit.  HPI information obtained from: patient report. Today's concern is:  ACP (advance care planning)     Visit for goals of care discussion in light of COVID 19 and multiple medical comorbid conditions     MEDICATIONS:not reviewed today    REVIEW OF SYSTEMS:  4 point ROS including Respiratory, CV, GI and , other than that noted in the HPI,  is negative    PE:  Alert, oriented, chronic pain and anxiety at baseline, and conversant      ASSESSMENT/PLAN:  ACP (advance care planning)  I spent 15 minutes face to face with patient discussing or completing advance care planning including Physician Orders for Life-Sustaining Treatment (POLST) in light of Full Code status, multiple medical comorbid conditions, and new COVID-19 pandemic concerns.  Answered questions regarding COVID 19 pandemic, risks of exposure, risks of serious illness or death if infected, and discussion regarding changes in POLST orders. All questions answered.       DNR/DNI updated in Epic to match POLST on file which is scanned and up to date     Electronically signed by:  HAILEE Contreras CNP

## 2020-03-20 ENCOUNTER — DOCUMENTATION ONLY (OUTPATIENT)
Dept: NEUROLOGY | Facility: CLINIC | Age: 80
End: 2020-03-20

## 2020-03-20 ENCOUNTER — MEDICAL CORRESPONDENCE (OUTPATIENT)
Dept: HEALTH INFORMATION MANAGEMENT | Facility: CLINIC | Age: 80
End: 2020-03-20

## 2020-03-20 NOTE — PROGRESS NOTES
Received BP forms from [facility], form was placed in provider folder fir signature    Received forms back signed by provider, forms were fax to [phone], sent to be scanned

## 2020-03-27 ENCOUNTER — MEDICAL CORRESPONDENCE (OUTPATIENT)
Dept: HEALTH INFORMATION MANAGEMENT | Facility: CLINIC | Age: 80
End: 2020-03-27

## 2020-03-27 ENCOUNTER — TRANSFERRED RECORDS (OUTPATIENT)
Dept: HEALTH INFORMATION MANAGEMENT | Facility: CLINIC | Age: 80
End: 2020-03-27

## 2020-03-27 ENCOUNTER — DOCUMENTATION ONLY (OUTPATIENT)
Dept: NEUROLOGY | Facility: CLINIC | Age: 80
End: 2020-03-27

## 2020-03-27 NOTE — PROGRESS NOTES
Received BP record from [facility]   Records Date of service: 3/27/20  Copy has been sent to scanning and encounter routed to specialty nurse for review. FYI ws placed in provider folder

## 2020-04-06 ENCOUNTER — APPOINTMENT (OUTPATIENT)
Dept: GENERAL RADIOLOGY | Facility: CLINIC | Age: 80
End: 2020-04-06
Attending: NURSE PRACTITIONER
Payer: COMMERCIAL

## 2020-04-06 ENCOUNTER — APPOINTMENT (OUTPATIENT)
Dept: CT IMAGING | Facility: CLINIC | Age: 80
End: 2020-04-06
Attending: NURSE PRACTITIONER
Payer: COMMERCIAL

## 2020-04-06 ENCOUNTER — HOSPITAL ENCOUNTER (EMERGENCY)
Facility: CLINIC | Age: 80
Discharge: HOME | End: 2020-04-07
Attending: NURSE PRACTITIONER | Admitting: NURSE PRACTITIONER
Payer: COMMERCIAL

## 2020-04-06 VITALS
WEIGHT: 164.9 LBS | HEIGHT: 66 IN | OXYGEN SATURATION: 95 % | DIASTOLIC BLOOD PRESSURE: 76 MMHG | HEART RATE: 62 BPM | SYSTOLIC BLOOD PRESSURE: 177 MMHG | RESPIRATION RATE: 18 BRPM | TEMPERATURE: 97.9 F | BODY MASS INDEX: 26.5 KG/M2
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
## 2020-04-06 DIAGNOSIS — M25.511 RIGHT SHOULDER PAIN: ICD-10-CM

## 2020-04-06 DIAGNOSIS — M25.561 RIGHT KNEE PAIN: ICD-10-CM

## 2020-04-06 LAB
ALBUMIN UR-MCNC: NEGATIVE MG/DL
APPEARANCE UR: CLEAR
BILIRUB UR QL STRIP: NEGATIVE
COLOR UR AUTO: ABNORMAL
GLUCOSE UR STRIP-MCNC: NEGATIVE MG/DL
HGB UR QL STRIP: NEGATIVE
KETONES UR STRIP-MCNC: NEGATIVE MG/DL
LEUKOCYTE ESTERASE UR QL STRIP: NEGATIVE
NITRATE UR QL: NEGATIVE
PH UR STRIP: 9 PH (ref 5–7)
SOURCE: ABNORMAL
SP GR UR STRIP: 1 (ref 1–1.03)
UROBILINOGEN UR STRIP-MCNC: 0 MG/DL (ref 0–2)

## 2020-04-06 PROCEDURE — 72072 X-RAY EXAM THORAC SPINE 3VWS: CPT

## 2020-04-06 PROCEDURE — 73560 X-RAY EXAM OF KNEE 1 OR 2: CPT | Mod: RT

## 2020-04-06 PROCEDURE — 73030 X-RAY EXAM OF SHOULDER: CPT | Mod: RT

## 2020-04-06 PROCEDURE — 81003 URINALYSIS AUTO W/O SCOPE: CPT | Performed by: NURSE PRACTITIONER

## 2020-04-06 PROCEDURE — 72125 CT NECK SPINE W/O DYE: CPT

## 2020-04-06 PROCEDURE — 25000132 ZZH RX MED GY IP 250 OP 250 PS 637: Performed by: NURSE PRACTITIONER

## 2020-04-06 PROCEDURE — 70450 CT HEAD/BRAIN W/O DYE: CPT

## 2020-04-06 PROCEDURE — 73502 X-RAY EXAM HIP UNI 2-3 VIEWS: CPT

## 2020-04-06 PROCEDURE — 72100 X-RAY EXAM L-S SPINE 2/3 VWS: CPT

## 2020-04-06 PROCEDURE — 99285 EMERGENCY DEPT VISIT HI MDM: CPT | Mod: 25 | Performed by: NURSE PRACTITIONER

## 2020-04-06 PROCEDURE — 99284 EMERGENCY DEPT VISIT MOD MDM: CPT | Mod: Z6 | Performed by: NURSE PRACTITIONER

## 2020-04-06 RX ORDER — HYDROCODONE BITARTRATE AND ACETAMINOPHEN 5; 325 MG/1; MG/1
1 TABLET ORAL ONCE
Status: COMPLETED | OUTPATIENT
Start: 2020-04-06 | End: 2020-04-06

## 2020-04-06 RX ORDER — ACETAMINOPHEN 325 MG/1
650 TABLET ORAL ONCE
Status: DISCONTINUED | OUTPATIENT
Start: 2020-04-06 | End: 2020-04-06

## 2020-04-06 RX ADMIN — HYDROCODONE BITARTRATE AND ACETAMINOPHEN 1 TABLET: 5; 325 TABLET ORAL at 22:26

## 2020-04-06 ASSESSMENT — MIFFLIN-ST. JEOR: SCORE: 1234.75

## 2020-04-06 NOTE — ED AVS SNAPSHOT
[facility]  [address] [address]  Phone:  [phone]  Fax:  [phone]                                    Mehreen Camara   MRN: [id number]    Department:  [facility]   Date of Visit:  4/6/2020           After Visit Summary Signature Page    I have received my discharge instructions, and my questions have been answered. I have discussed any challenges I see with this plan with the nurse or doctor.    ..........................................................................................................................................  Patient/Patient Representative Signature      ..........................................................................................................................................  Patient Representative Print Name and Relationship to Patient    ..................................................               ................................................  Date                                   Time    ..........................................................................................................................................  Reviewed by Signature/Title    ...................................................              ..............................................  Date                                               Time          22EPIC Rev 08/18

## 2020-04-07 PROCEDURE — 80320 DRUG SCREEN QUANTALCOHOLS: CPT | Performed by: NURSE PRACTITIONER

## 2020-04-07 ASSESSMENT — ENCOUNTER SYMPTOMS
FREQUENCY: 1
APPETITE CHANGE: 0
ARTHRALGIAS: 1
BACK PAIN: 1
NECK PAIN: 1
RESPIRATORY NEGATIVE: 1
NEUROLOGICAL NEGATIVE: 1
FEVER: 0
DYSURIA: 0
GASTROINTESTINAL NEGATIVE: 1

## 2020-04-07 NOTE — ED NOTES
Pt asking for Bed pan for 4th time since arrival.  Pt straight cath'd for 550ml light color urine.  Pt then ready for Radiology.  MD updated and informed pt is requesting a narcotic for pain control.   C/o burning pain in shoulder and back area currently.  Pt c/o pain in R hip if she tries to move it.       PLAN:  Will await xrays and CT scan results and will medicate per MD orders.

## 2020-04-07 NOTE — ED NOTES
Patient here after tripping while walking with her walker. Fell to hard floor, denies LOC, feels like she hit her head but cannot tell me where, no abrasions/bruising. C/o pain to right shoulder, hip, mid back & RLE. States right arm feels tingly since fall. New rash noted to right lower leg w/2-3+ swelling. Hypertensive on arrival, states she took all of her meds already tonight.

## 2020-04-07 NOTE — ED PROVIDER NOTES
History     Chief Complaint   Patient presents with     Fall     coming from [facility]  Mehreen Camara is a 80 year old female who lives at [facility] and tripped today while walking with her walker. Patient fell landing on her right side and did hit her head on the floor.  No LOC.  She was able to be assisted up and was able to ambulate with her walker into the bathroom.  She arrives here to the emergency department via EMS for evaluation.  Patient complains of right shoulder, right hip, and right knee pain.  Patient states she has back and neck pain, but had this pain prior to her fall and does not feel worse.  She states she initially had head pain immediately after she fell, but does not have pain now.    Allergies:  Allergies   Allergen Reactions     Penicillins Anaphylaxis, Rash and Shortness Of Breath     Aspirin Nausea and GI Disturbance     Atenolol Cough     Atorvastatin Muscle Pain (Myalgia) and Nausea and Vomiting     Bupropion Nausea     Clindamycin Other (See Comments)     Constipation      Codeine Nausea     Ibuprofen Nausea     Stomach upset     Lovastatin Muscle Pain (Myalgia)     Cephalexin Rash     Neck reddness       Problem List:    Patient Active Problem List    Diagnosis Date Noted     Rupture of left Achilles tendon, sequela 12/31/2019     Priority: Medium     Abdominal pain, generalized 12/31/2019     Priority: Medium     Flatulence, eructation, and gas pain 08/28/2019     Priority: Medium     Post-op pain 12/12/2018     Priority: Medium     S/P laparoscopic cholecystectomy 12/12/2018     Priority: Medium     Multiple system atrophy (H) 11/14/2018     Priority: Medium     Movement disorder 09/19/2018     Priority: Medium     Rheumatic aortic stenosis 09/19/2018     Priority: Medium     Disorder of bursae and tendons in shoulder region 09/18/2018     Priority: Medium     Overview:   Created by Conversion    Replacement Utility updated for latest  IMO load       Adjustment disorder with anxious mood 09/17/2018     Priority: Medium     Adjustment disorder with mixed anxiety and depressed mood 09/17/2018     Priority: Medium     Aneurysm of abdominal vessel (H) 09/17/2018     Priority: Medium     Overview:   Created by [facility] Annotation: Jun 8 2011  8:07AM - Danni Ortiz: 4.4 on 11/2013.    Needs 6-12 month u/s or CT.    Replacement Utility updated for latest IMO load       Harris's esophagus 09/17/2018     Priority: Medium     Overview:   Created by [facility] Annotation: Feb  3 2012  8:32AM - Cameron Obando: Needs EGD 2/2017       Bradycardia 09/17/2018     Priority: Medium     Cataract 09/17/2018     Priority: Medium     Overview:   Created by Conversion       Major depressive disorder, recurrent episode (H) 09/17/2018     Priority: Medium     Overview:   Created by Conversion    Replacement Utility updated for latest IMO load       Constipation 09/17/2018     Priority: Medium     Dizziness 09/17/2018     Priority: Medium     Overview:   Created by Conversion       Dyslipidemia 09/17/2018     Priority: Medium     Overview:   Created by Conversion       Esophageal reflux 09/17/2018     Priority: Medium     Overview:   Created by Conversion       Hypertension 09/17/2018     Priority: Medium     Overview:   Created by Conversion    Replacement Utility updated for latest IMO load       Hypothyroidism 09/17/2018     Priority: Medium     Overview:   Created by Conversion    Replacement Utility updated for latest IMO load       Insomnia due to anxiety and fear 09/17/2018     Priority: Medium     Lower back pain 09/17/2018     Priority: Medium     Osteoarthrosis 09/17/2018     Priority: Medium     Overview:   Created by Conversion    Replacement Utility updated for latest IMO load       Disorder of bone and cartilage 09/17/2018     Priority: Medium     Overview:   Created by [facility] Annotation: Dec 13 2012  7:41AM -  Roberta Kapoor: vit D/Ca, f/u   Dexa needed 2014.    Replacement Utility updated for latest IMO load       Lower extremity weakness 2018     Priority: Medium     Orthostatic hypotension dysautonomic syndrome (H) 2017     Priority: Medium     Primary insomnia 2017     Priority: Medium     Urinary incontinence in female 2017     Priority: Medium     Weakness 2017     Priority: Medium     S/P AAA repair using bifurcation graft 2015     Priority: Medium     Adrenal adenoma 2015     Priority: Medium     Overview:   Current hormonal evaluation through endocrinology          Past Medical History:    Past Medical History:   Diagnosis Date     Adrenal adenoma      Arthritis      Depressive disorder      Hypertension      Rheumatic fever      Small bowel obstruction (H)      Thyroid disease        Past Surgical History:    Past Surgical History:   Procedure Laterality Date     AAA REPAIR      [facility] bifurcation graft     CARPAL TUNNEL RELEASE RT/LT Bilateral 2013     HYSTERECTOMY  2013     JOINT REPLACEMENT Right 2003     LAPAROSCOPIC CHOLECYSTECTOMY N/A 2018    Procedure: LAPAROSCOPIC CHOLECYSTECTOMY;  Surgeon: Amadeo Sebastian MD;  Location: [facility] OR     ORTHOPEDIC SURGERY       SPINE SURGERY      cervical spine fusion     THYROIDECTOMY         Family History:    Family History   Problem Relation Age of Onset     Hypertension Mother      Coronary Artery Disease Mother      Coronary Artery Disease Father      Other Cancer Brother      Parkinsonism Other        Social History:  Marital Status:   [5]  Social History     Tobacco Use     Smoking status: Former Smoker     Packs/day: 0.50     Types: Cigarettes     Last attempt to quit: 1994     Years since quittin.3     Smokeless tobacco: Never Used   Substance Use Topics     Alcohol use: No     Drug use: No        Medications:    acetaminophen (TYLENOL) 500 MG tablet  acetaminophen (TYLENOL) 500 MG  "tablet  betamethasone dipropionate (DIPROSONE) 0.05 % external cream  bisacodyl (DULCOLAX) 10 MG suppository  BISACODYL PO  calcium carbonate (TUMS) 500 MG chewable tablet  carbidopa-levodopa (SINEMET)  MG tablet  clindamycin (CLEOCIN) 300 MG capsule  diclofenac (VOLTAREN) 1 % topical gel  gabapentin (NEURONTIN) 100 MG capsule  gabapentin (NEURONTIN) 100 MG capsule  hydrochlorothiazide (HYDRODIURIL) 25 MG tablet  HYDROcodone-acetaminophen (NORCO) 5-325 MG tablet  levothyroxine (SYNTHROID/LEVOTHROID) 137 MCG tablet  linaclotide (LINZESS) 290 MCG capsule  losartan (COZAAR) 50 MG tablet  magnesium citrate 1.745 GM/30ML solution  melatonin 3 MG tablet  mineral oil enema  Multiple Vitamin (TAB-A-KATIA) TABS  naloxegol 25 MG TABS tablet  pantoprazole (PROTONIX) 20 MG EC tablet  polyethylene glycol (MIRALAX/GLYCOLAX) packet  polyethylene glycol (MIRALAX/GLYCOLAX) packet  polyethylene glycol-propylene glycol (SYSTANE ULTRA) 0.4-0.3 % SOLN ophthalmic solution  ranitidine (ZANTAC) 150 MG tablet  sertraline (ZOLOFT) 25 MG tablet  Simethicone (GAS RELIEF) 125 MG CAPS  triamcinolone (KENALOG) 0.1 % external cream  vitamin D3 (CHOLECALCIFEROL) 2000 units (50 mcg) tablet  White Petrolatum (VASELINE) ointment          Review of Systems   Constitutional: Negative for appetite change and fever.   HENT: Negative.    Respiratory: Negative.    Cardiovascular: Negative for chest pain.   Gastrointestinal: Negative.    Genitourinary: Positive for frequency. Negative for dysuria.   Musculoskeletal: Positive for arthralgias (right shoulder, hip, and knee), back pain and neck pain.   Neurological: Negative.        Physical Exam   BP: (!) 220/109  Pulse: 66  Temp: 97.9  F (36.6  C)  Resp: 18  Height: 167.6 cm (5' 6\")  Weight: 74.8 kg (164 lb 14.5 oz)  SpO2: 97 %      Physical Exam  Constitutional:       General: She is in acute distress (tearful.).      Appearance: Normal appearance. She is not ill-appearing.   HENT:      Head: " Normocephalic and atraumatic.      Right Ear: Tympanic membrane normal.      Left Ear: Tympanic membrane normal.      Nose: Nose normal.      Mouth/Throat:      Mouth: Mucous membranes are moist.   Eyes:      Conjunctiva/sclera: Conjunctivae normal.      Pupils: Pupils are equal, round, and reactive to light.   Cardiovascular:      Rate and Rhythm: Normal rate and regular rhythm.   Pulmonary:      Effort: Pulmonary effort is normal.      Breath sounds: Normal breath sounds.   Musculoskeletal:        Back:       Comments: Right shoulder:  Increased pain with any movement. Patient is able to move her right arm and does push up with her arm to sit up in bed. There is no swelling, deformity, or ecchymosis.  There is a notable scattered lesions that are crusted and scabbed over along her right shoulder and arm.  Right hip: Able to perform full range of motion of her right hip and she does not complain of any pain while I am talking to her.  She does have tenderness when I push on her hip.  No overlying skin changes.  Right knee: No swelling or deformity noted.  No ecchymosis.  No erythema.  There is a faint abrasion noted along the anterior aspect of the right knee.     Skin:     General: Skin is warm and dry.   Neurological:      Mental Status: She is alert.         ED Course        Procedures                 Results for orders placed or performed during the hospital encounter of 04/06/20 (from the past 24 hour(s))   UA reflex to Microscopic and Culture    Specimen: Midstream Urine   Result Value Ref Range    Color Urine Straw     Appearance Urine Clear     Glucose Urine Negative NEG^Negative mg/dL    Bilirubin Urine Negative NEG^Negative    Ketones Urine Negative NEG^Negative mg/dL    Specific Gravity Urine 1.004 1.003 - 1.035    Blood Urine Negative NEG^Negative    pH Urine 9.0 (H) 5.0 - 7.0 pH    Protein Albumin Urine Negative NEG^Negative mg/dL    Urobilinogen mg/dL 0.0 0.0 - 2.0 mg/dL    Nitrite Urine Negative  NEG^Negative    Leukocyte Esterase Urine Negative NEG^Negative    Source Midstream Urine    XR Knee Right 1/2 Views    Narrative    EXAM: XR KNEE RT 1 /2 VW  LOCATION: [facility]  DATE/TIME: 4/6/2020 10:22 PM    INDICATION: Pain after fall.  COMPARISON: None.      Impression    IMPRESSION: No acute fracture or dislocation. Mild osteoarthritis.   Thoracic spine XR, 3 views    Narrative    EXAM: XR THORACIC SPINE 3 VW  LOCATION: [facility]  DATE/TIME: 4/6/2020 9:56 PM    INDICATION: Traumatic injury. Pain.  COMPARISON: None.      Impression    IMPRESSION: Diffuse bony demineralization. There is moderate dextroscoliosis centered at T8-T9. No radiographic evidence for acute fracture or subluxation   Lumbar spine XR, 2-3 views    Narrative    EXAM: XR LUMBAR SPINE 2-3 VIEWS  LOCATION: [facility]  DATE/TIME: 4/6/2020 9:56 PM    INDICATION: Traumatic injury. Pain.  COMPARISON: 01/27/2020      Impression    IMPRESSION: 5 lumbar type vertebra. Mild to moderate levoscoliosis. Straightening of usual lumbar curvature. Chronic mild to moderate compression deformity of L5. No definite radiographic evidence for acute fracture or subluxation. Aortoiliac stent   graft. L5   Shoulder XR, 2 view, right    Narrative    EXAM: XR SHOULDER 2 VIEW RIGHT  LOCATION: [facility]  DATE/TIME: 4/6/2020 9:56 PM    INDICATION: Pain after fall.  COMPARISON: None.      Impression    IMPRESSION: No acute fracture or dislocation. Arthritis in the AC joint and glenohumeral joint.    Pelvis XR w/ unilateral hip right    Narrative    EXAM: XR PELVIS AND HIP RIGHT 1 VIEW  LOCATION: [facility]  DATE/TIME: 4/6/2020 9:56 PM    INDICATION: Pain after fall.  COMPARISON: None.      Impression    IMPRESSION: Right hip arthroplasty. No acute fracture or dislocation.   Head CT w/o contrast    Narrative    EXAM: CT HEAD W/O CONTRAST, CT CERVICAL SPINE W/O CONTRAST  LOCATION: [facility]  DATE/TIME: 4/6/2020 10:19 PM    INDICATION: Traumatic injury. Pain.  COMPARISON: None.  TECHNIQUE:   HEAD CT: Without IV contrast. Multiplanar reformats. Dose reduction techniques were used.  CERVICAL SPINE CT: Routine without IV contrast. Multiplanar reformats. Dose reduction techniques were used.    FINDINGS:   HEAD CT:   INTRACRANIAL CONTENTS: No intracranial hemorrhage, extraaxial collection, or mass effect.  No CT evidence of acute infarct. Mild presumed chronic small vessel ischemic changes. Mild to moderate generalized volume loss. No hydrocephalus.     VISUALIZED ORBITS/SINUSES/MASTOIDS: No intraorbital abnormality. No paranasal sinus mucosal disease. No middle ear or mastoid effusion.    BONES/SOFT TISSUES: Small left frontal scalp hematoma. No fracture.    CERVICAL SPINE CT:   VERTEBRA: Reversal of the usual cervical lordosis. Vertebral body height is normal. Solid fusion from C5 through C7. Slight anterolisthesis of C7. No fracture or posttraumatic subluxation.     CANAL/FORAMINA: At C4-C5 there is moderate to severe bilateral neural foraminal stenosis. There is severe left foraminal stenosis at C3-C4.    PARASPINAL: No extraspinal abnormality. Visualized lung fields are clear.      Impression    IMPRESSION:  HEAD CT:  1.  No acute intracranial process.  2.  Left frontal scalp hematoma. No fracture.    CERVICAL SPINE CT:  1.  No CT evidence for acute fracture or post traumatic subluxation.  2.  Reversal of usual cervical lordosis.  3.  Solid fusion from C5 through C7.   Cervical spine CT w/o contrast    Narrative    EXAM: CT HEAD W/O CONTRAST, CT CERVICAL SPINE W/O CONTRAST  LOCATION: [facility]  DATE/TIME: 4/6/2020 10:19 PM    INDICATION: Traumatic injury. Pain.  COMPARISON: None.  TECHNIQUE:   HEAD CT: Without IV contrast. Multiplanar reformats. Dose reduction techniques were used.  CERVICAL SPINE CT: Routine without IV contrast. Multiplanar reformats. Dose reduction techniques  were used.    FINDINGS:   HEAD CT:   INTRACRANIAL CONTENTS: No intracranial hemorrhage, extraaxial collection, or mass effect.  No CT evidence of acute infarct. Mild presumed chronic small vessel ischemic changes. Mild to moderate generalized volume loss. No hydrocephalus.     VISUALIZED ORBITS/SINUSES/MASTOIDS: No intraorbital abnormality. No paranasal sinus mucosal disease. No middle ear or mastoid effusion.    BONES/SOFT TISSUES: Small left frontal scalp hematoma. No fracture.    CERVICAL SPINE CT:   VERTEBRA: Reversal of the usual cervical lordosis. Vertebral body height is normal. Solid fusion from C5 through C7. Slight anterolisthesis of C7. No fracture or posttraumatic subluxation.     CANAL/FORAMINA: At C4-C5 there is moderate to severe bilateral neural foraminal stenosis. There is severe left foraminal stenosis at C3-C4.    PARASPINAL: No extraspinal abnormality. Visualized lung fields are clear.      Impression    IMPRESSION:  HEAD CT:  1.  No acute intracranial process.  2.  Left frontal scalp hematoma. No fracture.    CERVICAL SPINE CT:  1.  No CT evidence for acute fracture or post traumatic subluxation.  2.  Reversal of usual cervical lordosis.  3.  Solid fusion from C5 through C7.       Medications   HYDROcodone-acetaminophen (NORCO) 5-325 MG per tablet 1 tablet (1 tablet Oral Given 4/6/20 2226)       Assessments & Plan (with Medical Decision Making)   80 year old female who presents emergency department from assisted living for evaluation of fall.  Patient was ambulating with her walker and tripped falling and landing on her right side.  She did not hit her head.  On exam patient is afebrile.  No tachycardia.  Elevated BP.  She does appear distressed and is tearful initially.  She is diffuse tenderness of her right shoulder, right hip, and right knee with palpation.  She is able to perform full range of motion of her right hip and right knee.  She has chronic bilateral shoulder pain, but is able to  push self up in bed using both of her arms.  Xrays ,of her right shoulder, right hip, right knee, lumbar spine, and thoracic spine reveal no acute fractures.  Head CT and cervical spine CT revealed no evidence for acute fracture or intracranial bleeding.  I have reviewed the imaging with patient and reassurance provided.  Patient requested pain medication and she was given a dose of Norco which is what she takes at home.  I spoke with patient's daughter on the phone with the patient's permission.  Patient's daughter will pick patient up and bring her back to the assisted living.  Patient does appear stable for discharge and is able to get up and sit on the commode.  Patient was instructed to move slowly over the next couple days and use her walker.  Return for any worsening symptoms.    I have reviewed the nursing notes.    I have reviewed the findings, diagnosis, plan and need for follow up with the patient.      New Prescriptions    No medications on file       Final diagnoses:   Fall, initial encounter   Right shoulder pain   Right knee pain       4/6/2020   [facility] EMERGENCY DEPARTMENT     Iva Boucher APRN CNP  04/07/20 0031

## 2020-04-13 ENCOUNTER — MEDICAL CORRESPONDENCE (OUTPATIENT)
Dept: HEALTH INFORMATION MANAGEMENT | Facility: CLINIC | Age: 80
End: 2020-04-13

## 2020-04-13 ENCOUNTER — DOCUMENTATION ONLY (OUTPATIENT)
Dept: NEUROLOGY | Facility: CLINIC | Age: 80
End: 2020-04-13

## 2020-04-21 ENCOUNTER — DOCUMENTATION ONLY (OUTPATIENT)
Dept: NEUROLOGY | Facility: CLINIC | Age: 80
End: 2020-04-21

## 2020-04-21 ENCOUNTER — MEDICAL CORRESPONDENCE (OUTPATIENT)
Dept: HEALTH INFORMATION MANAGEMENT | Facility: CLINIC | Age: 80
End: 2020-04-21

## 2020-04-24 ENCOUNTER — MEDICAL CORRESPONDENCE (OUTPATIENT)
Dept: HEALTH INFORMATION MANAGEMENT | Facility: CLINIC | Age: 80
End: 2020-04-24

## 2020-04-24 ENCOUNTER — DOCUMENTATION ONLY (OUTPATIENT)
Dept: NEUROLOGY | Facility: CLINIC | Age: 80
End: 2020-04-24

## 2020-04-24 NOTE — PROGRESS NOTES
Received blood pressure report dated from 4/24/20. A copy was sent to scan and a copy placed in providers folder as FYI.

## 2020-05-05 DIAGNOSIS — K59.04 CHRONIC IDIOPATHIC CONSTIPATION: Primary | ICD-10-CM

## 2020-05-06 RX ORDER — POLYETHYLENE GLYCOL 3350 17 G/17G
POWDER, FOR SOLUTION ORAL
Qty: 510 G | Refills: 11 | Status: SHIPPED | OUTPATIENT
Start: 2020-05-06 | End: 2020-06-15

## 2020-05-21 ENCOUNTER — TRANSFERRED RECORDS (OUTPATIENT)
Dept: HEALTH INFORMATION MANAGEMENT | Facility: CLINIC | Age: 80
End: 2020-05-21

## 2020-05-21 NOTE — PROGRESS NOTES
"[facility] Geriatrics Video Visit  Mehreen Camara is a 80 year old female who is being evaluated via a billable video visit due to the restrictions of the COVID19 pandemic.The patient has been notified of following: \"This video visit will be conducted via a call between you and your provider. We have found that certain health care needs can be provided without the need for an in-person physical exam.  This service lets us provide the care you need with a video conversation.  If a prescription is necessary we can send it directly to your pharmacy.  If lab work is needed we can place an order for that and you can then stop by our lab to have the test done at a later time. If during the course of the call the provider feels a video visit is not appropriate, you will not be charged for this service.\"     Proivder has received verbal consent for a Video Visit from the patient? Yes    Patient/facility would like the video invitation sent by: Send to e-mail at: [email or web address]    This visit took place virtually, with the patient located at [facility].  ________________________________________________  Video Start Time: 1131  PRIMARY CARE PROVIDER AND CLINIC:  HAILEE Contreras CNP, [address] [place] [address] / [facility] [address]  Chief Complaint   Patient presents with     Video Visit     Admission   [place] Medical Record Number:  [id number]. Mehreen Camara  is a 80 year old  (1940), admitted to the above facility from  [facility]. Hospital stay 5/16 through 5/21..  Admitted to this facility for rehab, medical management and nursing care. HPI information obtained from: facility chart records, facility staff, patient report, [facility] chart review and Care Everywhere [facility] chart review.     Brief Summary of Hospital Course: Mehreen presented to [facility] on 5/16 w/ acute-on-chronic back pain. Imaging showed moderate spinal canal narrowing. She was started on PO " "steroids, but then developed encephalopathy from steroids and increased opioid use. She also experienced hypotension, and her hydrochlorothiazide was stopped. She was evaluated by ortho, treated conservatively, started clearing cognitively, and discharged to TCU.     Updates since admission to transitional care unit: Mehreen presented to TCU on 5/21 s/p the above hospitalization. Today, she states her pain is mild-moderate, and travels intermittently down her legs. She states she sleeps well, has a good appetite. She denies fever, headache, dizziness, SOB, cough, CP, palpitations. She denies nausea/heartburn (but we note her significant regimen), she states she does have some constipation and bloating today and feels \"backed-up.\" Per nursing, patient's daughter has already called staff and requested more narcotics for her mother. Staff state she has been comfortable in her room but occasionally tearful/anxious. Chart review shows stable blood work, but significant HTN at TCU as noted below:  BPs:  05/22/2020 07:35 208/118 mmHg   05/22/2020 00:48 158/78 mmHg   05/21/2020 18:41 141/82 mmHg    CODE STATUS/ADVANCE DIRECTIVES DISCUSSION: DNR/DNI.    Patient's living condition: lives in an assisted living facility ALLERGIES: Penicillins; Aspirin; Atenolol; Atorvastatin; Bupropion; Clindamycin; Codeine; Ibuprofen; Lovastatin; and Cephalexin PAST MEDICAL HISTORY:  has a past medical history of Adrenal adenoma, Arthritis, Depressive disorder, Hypertension, Rheumatic fever, Small bowel obstruction (H), and Thyroid disease. PAST SURGICAL HISTORY:   has a past surgical history that includes orthopedic surgery; Thyroidectomy (2011); joint replacement (Right, 2003); Hysterectomy (2013); carpal tunnel release rt/lt (Bilateral, 2013); Spine surgery (1981); aaa repair (2015); and Laparoscopic cholecystectomy (N/A, 12/12/2018). FAMILY HISTORY: family history includes Coronary Artery Disease in her father and mother; Hypertension in " her mother; Other Cancer in her brother; Parkinsonism in an other family member. SOCIAL HISTORY:   reports that she quit smoking about 25 years ago. Her smoking use included cigarettes. She smoked 0.50 packs per day. She has never used smokeless tobacco. She reports that she does not drink alcohol or use drugs.  Post Discharge Medication Reconciliation Status: discharge medications reconciled and changed, per note/orders (see AVS)  Current Outpatient Medications   Medication Sig Dispense Refill     acetaminophen (TYLENOL) 500 MG tablet Take 500-1,000 mg by mouth 3 times daily as needed for mild pain       acetaminophen (TYLENOL) 500 MG tablet Take 1 tablet (500 mg) by mouth 3 times daily  0     betamethasone dipropionate (DIPROSONE) 0.05 % external cream Apply topically 2 times daily as needed       bisacodyl (DULCOLAX) 10 MG suppository Place 10 mg rectally daily as needed for constipation       BISACODYL PO Take 5 mg by mouth daily       calcium carbonate (TUMS) 500 MG chewable tablet Take 1 tablet (500 mg) by mouth every 2 hours as needed for heartburn 150 tablet 1     carbidopa-levodopa (SINEMET)  MG tablet Take 2 tablets by mouth 3 times daily 0700, 1230, 1830       clindamycin (CLEOCIN) 300 MG capsule Take 600 mg by mouth as needed Give 1 hour prior to dental appoitments       diclofenac (VOLTAREN) 1 % topical gel Apply 4 g topically 4 times daily as needed for moderate pain (to back and neck) 100 g 11     gabapentin (NEURONTIN) 100 MG capsule Take 200 mg by mouth daily (with dinner)       gabapentin (NEURONTIN) 100 MG capsule Take 300 mg by mouth 2 times daily AM and noon       GAS RELIEF 125 MG CAPS TAKE 1 CAPSULE BY MOUTH FOUR TIMES DAILY AS NEEDED WITH MEALS 30 capsule 11     GAVILAX powder MIX 1 CAPFUL (17GM) IN LIQUID AND DRINK BY MOUTH TWICE DAILY DX CONSTIPATION;MIX 17GM IN LIQUID AND DRINK 1-2 TIMES A DAY AS NEEDED 510 g 11     hydrochlorothiazide (HYDRODIURIL) 25 MG tablet Take 1 tablet (25 mg)  by mouth daily       HYDROcodone-acetaminophen (NORCO) 5-325 MG tablet TAKE 1 TABLET BY MOUTH TWICE DAILY (MAX APAP 4GM/24HRS);TAKE 1 TABLET BY MOUTH EVERY 4 HOURS AS NEEDED (MAX APAP 4GM/24HRS) 100 tablet 0     levothyroxine (SYNTHROID/LEVOTHROID) 137 MCG tablet TAKE 1 TABLET BY MOUTH EVERY DAY DX HYPOTHYROIDISM 30 tablet 11     linaclotide (LINZESS) 290 MCG capsule Take 290 mcg by mouth every morning (before breakfast)       losartan (COZAAR) 50 MG tablet TAKE TABLET BY MOUTH EVERY MORNING 30 tablet 11     magnesium citrate 1.745 GM/30ML solution Take 296 mLs by mouth daily as needed for other       melatonin 3 MG tablet Take 1 tablet (3 mg) by mouth At Bedtime       mineral oil enema Place 1 enema rectally as needed for constipation 1 enema 0     Multiple Vitamin (TAB-A-KATIA) TABS TAKE 1 TABLET BY MOUTH EVERY DAY FOR SUPPLEMENT, WEIGHT LOSS 100 tablet 11     naloxegol 25 MG TABS tablet Take 25 mg by mouth every morning (before breakfast)       pantoprazole (PROTONIX) 20 MG EC tablet Take 20 mg by mouth daily       polyethylene glycol (MIRALAX/GLYCOLAX) packet Take 17 g by mouth 2 times daily        polyethylene glycol (MIRALAX/GLYCOLAX) packet Take 17 g by mouth daily as needed for constipation       polyethylene glycol-propylene glycol (SYSTANE ULTRA) 0.4-0.3 % SOLN ophthalmic solution Place 2 drops into both eyes 4 times daily as needed for dry eyes       ranitidine (ZANTAC) 150 MG tablet TAKE 1 TABLET BY MOUTH DAILY AT BEDTIME DX GASTROESOPHAGEAL REFLUX DISEASE 30 tablet 11     sertraline (ZOLOFT) 25 MG tablet Take 3 tablets (75 mg) by mouth At Bedtime 60 tablet 0     triamcinolone (KENALOG) 0.1 % external cream Apply topically 2 times daily as needed for irritation       vitamin D3 (CHOLECALCIFEROL) 2000 units (50 mcg) tablet Take 2,000 Units by mouth daily       White Petrolatum (VASELINE) ointment Apply topically 3 times daily Apply to vulva 2-3 times a day 0700, 1200, 1830       REVIEW OF SYSTEMS: 10 point  ROS of systems including Constitutional, Eyes, Respiratory, Cardiovascular, Gastroenterology, Genitourinary, Integumentary, Musculoskeletal, Psychiatric were all negative except for pertinent positives noted in my HPI.    Objective:  BP (!) 208/118   Pulse 97   Temp 96.1  F (35.6  C)   Resp 16   Wt 70.1 kg (154 lb 9.6 oz)   SpO2 98%   BMI 24.95 kg/m    GENERAL APPEARANCE: Alert, in no distress, cooperative.   EYES/ENT: EOM normal on camera, hearing acuity [place].  RESP: Respiratory effort appears unlabored over video screen, no respiratory distress apparent on video, On RA.   CV: Edema appears trace BLE via video. Color appears pale.  ABDOMEN: Appears non-distended.  SKIN: Skin appears baseline w/ video inspection. No wounds/rashes noted.    NEURO: CN II-XII at patient's baseline, MARMOLEJO at baseline on video. Sensation baseline PPS.  PSYCH: Insight, judgement, and memory are baseline, affect and mood are labile/engaged.    Laboratory/Diagnostics:   CBC RESULTS:   Recent Labs   Lab Test 01/27/20  1722 12/28/19  1408   WBC 6.1 5.5   RBC 4.29 4.13   HGB 13.7 13.2   HCT 44.2 40.2   * 97   MCH 31.9 32.0   MCHC 31.0* 32.8   RDW 11.9 11.5    207   Last Basic Metabolic Panel:  Recent Labs   Lab Test 01/27/20  1722 12/28/19  1408    138   POTASSIUM 3.6 3.6   CHLORIDE 105 102   YADIRA 8.8 9.4   CO2 26 33*   BUN 14 10   CR 0.45* 0.39*   GLC 92 96   Liver Function Studies -   Recent Labs   Lab Test 01/27/20  1722 12/28/19  1408   PROTTOTAL 7.5 7.4   ALBUMIN 3.0* 3.6   BILITOTAL 0.4 0.7   ALKPHOS 73 64   AST 21 24   ALT 16 21     Assessment/Plan:  Acute bilateral low back pain with bilateral sciatica  Chronic idiopathic constipation  Adjustment disorder with mixed anxiety and depressed mood  Episode of recurrent major depressive disorder, unspecified depression episode severity (H)  Acute-on-chronic. Tenuous.    Noting psychological impact of pain and mood and vice-versa. Unclear if anxiety is driving pain or  vice-versa.    We note constipation, and will add a PRN Bisacodyl to address.    We note opioids will not address radiculopathy, and we will instead increase Gabapentin, which may assist in mood as well.     Follow-up w/in 2 weeks or as needed.   Essential hypertension  Aneurysm of abdominal vessel (H)  Polypharmacy  Acute-on-chronic. Ongoing.    We note polypharmacy. Given good appetite, stable weight, stable sleep. We will discontinue both MVI and melatonin to decrease polypharmacy.    We note significant HTN, although pain appears comfortable (so potentially not pain-related). We will cautiously restart hydrochlorothiazide at low dose and trend electrolytes given ARB also on-board.    We note the significant risk of HTN in presence of AAA, nursing to carefully monitor and report.   Follow-up w/in 2 weeks or as needed.     Orders:  1. Hydrochlorothiazide 12.5mg PO QAM. Dx: HTN.  2. Recheck BMP x1 on 5/29. Dx: HTN.  3. DC MVI.  4. DC Melatonin.  5. Bisacodyl 5mg PO QD PRN. Dx: constipation.  6. Increase Gabapentin to 300mg PO TID. Dx: radiculopathy.       Video-Visit Details  Type of service:  Video Visit  Video Start Time: 1131  Video End Time (time video stopped): 1141  Originating Location (pt. Location): TCU  Distant Location (provider location):  GERIATRICS TRANSITIONAL CARE   Mode of Communication:  Video Conference.    Electronically signed by:  Dr. Lisa Parry, APRN CNP DNP

## 2020-05-22 ENCOUNTER — VIRTUAL VISIT (OUTPATIENT)
Dept: GERIATRICS | Facility: CLINIC | Age: 80
End: 2020-05-22
Payer: COMMERCIAL

## 2020-05-22 VITALS
WEIGHT: 154.6 LBS | OXYGEN SATURATION: 98 % | HEART RATE: 97 BPM | RESPIRATION RATE: 16 BRPM | BODY MASS INDEX: 24.95 KG/M2 | TEMPERATURE: 96.1 F | DIASTOLIC BLOOD PRESSURE: 118 MMHG | SYSTOLIC BLOOD PRESSURE: 208 MMHG

## 2020-05-22 DIAGNOSIS — M54.41 ACUTE BILATERAL LOW BACK PAIN WITH BILATERAL SCIATICA: Primary | ICD-10-CM

## 2020-05-22 DIAGNOSIS — F33.9 EPISODE OF RECURRENT MAJOR DEPRESSIVE DISORDER, UNSPECIFIED DEPRESSION EPISODE SEVERITY (H): ICD-10-CM

## 2020-05-22 DIAGNOSIS — K59.04 CHRONIC IDIOPATHIC CONSTIPATION: ICD-10-CM

## 2020-05-22 DIAGNOSIS — Z79.899 POLYPHARMACY: ICD-10-CM

## 2020-05-22 DIAGNOSIS — M54.42 ACUTE BILATERAL LOW BACK PAIN WITH BILATERAL SCIATICA: Primary | ICD-10-CM

## 2020-05-22 DIAGNOSIS — I10 ESSENTIAL HYPERTENSION: ICD-10-CM

## 2020-05-22 DIAGNOSIS — F43.23 ADJUSTMENT DISORDER WITH MIXED ANXIETY AND DEPRESSED MOOD: ICD-10-CM

## 2020-05-22 DIAGNOSIS — I71.40 ANEURYSM OF ABDOMINAL VESSEL (H): ICD-10-CM

## 2020-05-22 PROCEDURE — 99306 1ST NF CARE HIGH MDM 50: CPT | Mod: 95 | Performed by: NURSE PRACTITIONER

## 2020-05-22 RX ORDER — HYDROCHLOROTHIAZIDE 25 MG/1
12.5 TABLET ORAL DAILY
Start: 2020-05-22 | End: 2020-06-04

## 2020-05-22 NOTE — LETTER
"    5/22/2020        RE: Mehreen Camara  [facility]  [address]  [facility] [address]        [facility] Geriatrics Video Visit  Mehreen Camara is a 80 year old female who is being evaluated via a billable video visit due to the restrictions of the COVID19 pandemic.The patient has been notified of following: \"This video visit will be conducted via a call between you and your provider. We have found that certain health care needs can be provided without the need for an in-person physical exam.  This service lets us provide the care you need with a video conversation.  If a prescription is necessary we can send it directly to your pharmacy.  If lab work is needed we can place an order for that and you can then stop by our lab to have the test done at a later time. If during the course of the call the provider feels a video visit is not appropriate, you will not be charged for this service.\"     Proivder has received verbal consent for a Video Visit from the patient? Yes    Patient/facility would like the video invitation sent by: Send to e-mail at: [email or web address]    This visit took place virtually, with the patient located at [facility].  ________________________________________________  Video Start Time: 1131  PRIMARY CARE PROVIDER AND CLINIC:  HAILEE Contreras CNP, [address] [place] [address] / [facility] [address]  Chief Complaint   Patient presents with     Video Visit     Admission   [place] Medical Record Number:  [id number]. Mehreen Camara  is a 80 year old  (1940), admitted to the above facility from  [facility]. Hospital stay 5/16 through 5/21..  Admitted to this facility for rehab, medical management and nursing care. HPI information obtained from: facility chart records, facility staff, patient report, [facility] chart review and Care Everywhere Epic chart review.     Brief Summary of Hospital Course: Mehreen presented to [facility] on [facility] w/ acute-on-chronic back pain. Imaging showed moderate spinal canal narrowing. She was started on PO steroids, but then developed encephalopathy from steroids and increased opioid use. She also experienced hypotension, and her hydrochlorothiazide was stopped. She was evaluated by ortho, treated conservatively, started clearing cognitively, and discharged to TCU.     Updates since admission to transitional care unit: Mehreen presented to TCU on 5/21 s/p the above hospitalization. Today, she states her pain is mild-moderate, and travels intermittently down her legs. She states she sleeps well, has a good appetite. She denies fever, headache, dizziness, SOB, cough, CP, palpitations. She denies nausea/heartburn (but we note her significant regimen), she states she does have some constipation and bloating today and feels \"backed-up.\" Per nursing, patient's daughter has already called staff and requested more narcotics for her mother. Staff state she has been comfortable in her room but occasionally tearful/anxious. Chart review shows stable blood work, but significant HTN at TCU as noted below:  BPs:  05/22/2020 07:35 208/118 mmHg   05/22/2020 00:48 158/78 mmHg   05/21/2020 18:41 141/82 mmHg    CODE STATUS/ADVANCE DIRECTIVES DISCUSSION: DNR/DNI.    Patient's living condition: lives in an assisted living facility ALLERGIES: Penicillins; Aspirin; Atenolol; Atorvastatin; Bupropion; Clindamycin; Codeine; Ibuprofen; Lovastatin; and Cephalexin PAST MEDICAL HISTORY:  has a past medical history of Adrenal adenoma, Arthritis, Depressive disorder, Hypertension, Rheumatic fever, Small bowel obstruction (H), and Thyroid disease. PAST SURGICAL HISTORY:   has a past surgical history that includes orthopedic surgery; Thyroidectomy (2011); joint replacement (Right, 2003); Hysterectomy (2013); carpal tunnel release rt/lt (Bilateral, 2013); Spine surgery (1981); aaa repair (2015); and Laparoscopic cholecystectomy (N/A, " 12/12/2018). FAMILY HISTORY: family history includes Coronary Artery Disease in her father and mother; Hypertension in her mother; Other Cancer in her brother; Parkinsonism in an other family member. SOCIAL HISTORY:   reports that she quit smoking about 25 years ago. Her smoking use included cigarettes. She smoked 0.50 packs per day. She has never used smokeless tobacco. She reports that she does not drink alcohol or use drugs.  Post Discharge Medication Reconciliation Status: discharge medications reconciled and changed, per note/orders (see AVS)  Current Outpatient Medications   Medication Sig Dispense Refill     acetaminophen (TYLENOL) 500 MG tablet Take 500-1,000 mg by mouth 3 times daily as needed for mild pain       acetaminophen (TYLENOL) 500 MG tablet Take 1 tablet (500 mg) by mouth 3 times daily  0     betamethasone dipropionate (DIPROSONE) 0.05 % external cream Apply topically 2 times daily as needed       bisacodyl (DULCOLAX) 10 MG suppository Place 10 mg rectally daily as needed for constipation       BISACODYL PO Take 5 mg by mouth daily       calcium carbonate (TUMS) 500 MG chewable tablet Take 1 tablet (500 mg) by mouth every 2 hours as needed for heartburn 150 tablet 1     carbidopa-levodopa (SINEMET)  MG tablet Take 2 tablets by mouth 3 times daily 0700, 1230, 1830       clindamycin (CLEOCIN) 300 MG capsule Take 600 mg by mouth as needed Give 1 hour prior to dental appoitments       diclofenac (VOLTAREN) 1 % topical gel Apply 4 g topically 4 times daily as needed for moderate pain (to back and neck) 100 g 11     gabapentin (NEURONTIN) 100 MG capsule Take 200 mg by mouth daily (with dinner)       gabapentin (NEURONTIN) 100 MG capsule Take 300 mg by mouth 2 times daily AM and noon       GAS RELIEF 125 MG CAPS TAKE 1 CAPSULE BY MOUTH FOUR TIMES DAILY AS NEEDED WITH MEALS 30 capsule 11     GAVILAX powder MIX 1 CAPFUL (17GM) IN LIQUID AND DRINK BY MOUTH TWICE DAILY DX CONSTIPATION;MIX 17GM IN  LIQUID AND DRINK 1-2 TIMES A DAY AS NEEDED 510 g 11     hydrochlorothiazide (HYDRODIURIL) 25 MG tablet Take 1 tablet (25 mg) by mouth daily       HYDROcodone-acetaminophen (NORCO) 5-325 MG tablet TAKE 1 TABLET BY MOUTH TWICE DAILY (MAX APAP 4GM/24HRS);TAKE 1 TABLET BY MOUTH EVERY 4 HOURS AS NEEDED (MAX APAP 4GM/24HRS) 100 tablet 0     levothyroxine (SYNTHROID/LEVOTHROID) 137 MCG tablet TAKE 1 TABLET BY MOUTH EVERY DAY DX HYPOTHYROIDISM 30 tablet 11     linaclotide (LINZESS) 290 MCG capsule Take 290 mcg by mouth every morning (before breakfast)       losartan (COZAAR) 50 MG tablet TAKE TABLET BY MOUTH EVERY MORNING 30 tablet 11     magnesium citrate 1.745 GM/30ML solution Take 296 mLs by mouth daily as needed for other       melatonin 3 MG tablet Take 1 tablet (3 mg) by mouth At Bedtime       mineral oil enema Place 1 enema rectally as needed for constipation 1 enema 0     Multiple Vitamin (TAB-A-KATIA) TABS TAKE 1 TABLET BY MOUTH EVERY DAY FOR SUPPLEMENT, WEIGHT LOSS 100 tablet 11     naloxegol 25 MG TABS tablet Take 25 mg by mouth every morning (before breakfast)       pantoprazole (PROTONIX) 20 MG EC tablet Take 20 mg by mouth daily       polyethylene glycol (MIRALAX/GLYCOLAX) packet Take 17 g by mouth 2 times daily        polyethylene glycol (MIRALAX/GLYCOLAX) packet Take 17 g by mouth daily as needed for constipation       polyethylene glycol-propylene glycol (SYSTANE ULTRA) 0.4-0.3 % SOLN ophthalmic solution Place 2 drops into both eyes 4 times daily as needed for dry eyes       ranitidine (ZANTAC) 150 MG tablet TAKE 1 TABLET BY MOUTH DAILY AT BEDTIME DX GASTROESOPHAGEAL REFLUX DISEASE 30 tablet 11     sertraline (ZOLOFT) 25 MG tablet Take 3 tablets (75 mg) by mouth At Bedtime 60 tablet 0     triamcinolone (KENALOG) 0.1 % external cream Apply topically 2 times daily as needed for irritation       vitamin D3 (CHOLECALCIFEROL) 2000 units (50 mcg) tablet Take 2,000 Units by mouth daily       White Petrolatum  (VASELINE) ointment Apply topically 3 times daily Apply to vulva 2-3 times a day 0700, 1200, 1830       REVIEW OF SYSTEMS: 10 point ROS of systems including Constitutional, Eyes, Respiratory, Cardiovascular, Gastroenterology, Genitourinary, Integumentary, Musculoskeletal, Psychiatric were all negative except for pertinent positives noted in my HPI.    Objective:  BP (!) 208/118   Pulse 97   Temp 96.1  F (35.6  C)   Resp 16   Wt 70.1 kg (154 lb 9.6 oz)   SpO2 98%   BMI 24.95 kg/m    GENERAL APPEARANCE: Alert, in no distress, cooperative.   EYES/ENT: EOM normal on camera, hearing acuity [place].  RESP: Respiratory effort appears unlabored over video screen, no respiratory distress apparent on video, On RA.   CV: Edema appears trace BLE via video. Color appears pale.  ABDOMEN: Appears non-distended.  SKIN: Skin appears baseline w/ video inspection. No wounds/rashes noted.    NEURO: CN II-XII at patient's baseline, MARMOLEJO at baseline on video. Sensation baseline PPS.  PSYCH: Insight, judgement, and memory are baseline, affect and mood are labile/engaged.    Laboratory/Diagnostics:   CBC RESULTS:   Recent Labs   Lab Test 01/27/20  1722 12/28/19  1408   WBC 6.1 5.5   RBC 4.29 4.13   HGB 13.7 13.2   HCT 44.2 40.2   * 97   MCH 31.9 32.0   MCHC 31.0* 32.8   RDW 11.9 11.5    207   Last Basic Metabolic Panel:  Recent Labs   Lab Test 01/27/20  1722 12/28/19  1408    138   POTASSIUM 3.6 3.6   CHLORIDE 105 102   YADIRA 8.8 9.4   CO2 26 33*   BUN 14 10   CR 0.45* 0.39*   GLC 92 96   Liver Function Studies -   Recent Labs   Lab Test 01/27/20  1722 12/28/19  1408   PROTTOTAL 7.5 7.4   ALBUMIN 3.0* 3.6   BILITOTAL 0.4 0.7   ALKPHOS 73 64   AST 21 24   ALT 16 21     Assessment/Plan:  Acute bilateral low back pain with bilateral sciatica  Chronic idiopathic constipation  Adjustment disorder with mixed anxiety and depressed mood  Episode of recurrent major depressive disorder, unspecified depression episode severity  (H)  Acute-on-chronic. Tenuous.    Noting psychological impact of pain and mood and vice-versa. Unclear if anxiety is driving pain or vice-versa.    We note constipation, and will add a PRN Bisacodyl to address.    We note opioids will not address radiculopathy, and we will instead increase Gabapentin, which may assist in mood as well.     Follow-up w/in 2 weeks or as needed.   Essential hypertension  Aneurysm of abdominal vessel (H)  Polypharmacy  Acute-on-chronic. Ongoing.    We note polypharmacy. Given good appetite, stable weight, stable sleep. We will discontinue both MVI and melatonin to decrease polypharmacy.    We note significant HTN, although pain appears comfortable (so potentially not pain-related). We will cautiously restart hydrochlorothiazide at low dose and trend electrolytes given ARB also on-board.    We note the significant risk of HTN in presence of AAA, nursing to carefully monitor and report.   Follow-up w/in 2 weeks or as needed.     Orders:  1. Hydrochlorothiazide 12.5mg PO QAM. Dx: HTN.  2. Recheck BMP x1 on 5/29. Dx: HTN.  3. DC MVI.  4. DC Melatonin.  5. Bisacodyl 5mg PO QD PRN. Dx: constipation.  6. Increase Gabapentin to 300mg PO TID. Dx: radiculopathy.       Video-Visit Details  Type of service:  Video Visit  Video Start Time: 1131  Video End Time (time video stopped): 1141  Originating Location (pt. Location): TCU  Distant Location (provider location):  GERIATRICS TRANSITIONAL CARE   Mode of Communication:  Video Conference.    Electronically signed by:  HAILEE Gilbert CNP DNP            Sincerely,        HAILEE Langley CNP

## 2020-05-26 ENCOUNTER — TELEPHONE (OUTPATIENT)
Dept: NEUROLOGY | Facility: CLINIC | Age: 80
End: 2020-05-26

## 2020-05-26 DIAGNOSIS — G23.2 MULTIPLE SYSTEM ATROPHY P (H): Primary | ICD-10-CM

## 2020-05-26 NOTE — TELEPHONE ENCOUNTER
Eladio stated that since Mehreen has been discharged from the hospital her blood pressures has been worse. She has larger swings downward when she gets up. She reports they changed her sertraline in the hospital from 75 mg to 150 mg and would like to know Dr. Monterroso's recommendation regarding this affecting her blood pressure. Her systolic numbers have been 70/60s. If Dr. Monterroso has recommendations on how this affects her MSA and blood pressures she would like us to relay this to the facility. [facility] Nurse practitioner prescribes the Zoloft. Eladio would also like to be present at the video visit, cell phone: [phone]  Last Thursday Mehreen she was discharge to TCU    Eladio stated she has tried to talk with the nurse manager of the TCU but feels the nurse manager does not take her concerns into perspective.    [phone] Nurse Station  Mehreen's number at the TCU: [phone]    Plan/recommendation:  1. Marcia (nurse) will fax over Mehreen's blood pressure record from 5/21 - present.    2. I will give the BP record to Dr. Monterroso and let him know your concerns. He does not prescribe the Zoloft but if he has recommendations we can relay this to the facility's prescribing provider.

## 2020-05-26 NOTE — TELEPHONE ENCOUNTER
[facility] Call Center    Phone Message    May a detailed message be left on voicemail: yes     Reason for Call: Other: Eladio calling on behalf of her mother Mehreen to ask the care team a few questions. 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 Eladio says that recently, Mehreen went to the hospital and they increased her sertraline (ZOLOFT) 25 MG tablet, which also lowers her blood pressure. Eldaio syas that Mehreen blood pressure is no very low, and Mehreen's condition has worsened. Eladio wanted to reach out to Dr. Monterroso, because she feels that Mehreen is receiving too much of this medication. Since Dr. Monterroso tracks her MSA, Eladio thought he would be a good provider to ask. Eladio would like to know what Dr. Monterroso would recommend in regards to the medication. Eladio also wanted to know if it is possible for her to be on the video visit appointment with her mom on 6/9/20. Eladio is not allowed in the nursing home with Mehreen at this time, so she is wondering if there is a way for her to be on the video call as well. Please give Eladio a call back at your earliest convenience to discuss.     Action Taken: Message routed to:  [facility]  Neuro    Travel Screening: Not Applicable

## 2020-05-29 ENCOUNTER — TRANSFERRED RECORDS (OUTPATIENT)
Dept: HEALTH INFORMATION MANAGEMENT | Facility: CLINIC | Age: 80
End: 2020-05-29

## 2020-06-01 ENCOUNTER — RECORDS - HEALTHEAST (OUTPATIENT)
Dept: LAB | Facility: CLINIC | Age: 80
End: 2020-06-01

## 2020-06-01 ENCOUNTER — TRANSFERRED RECORDS (OUTPATIENT)
Dept: HEALTH INFORMATION MANAGEMENT | Facility: CLINIC | Age: 80
End: 2020-06-01

## 2020-06-01 LAB
ANION GAP SERPL CALCULATED.3IONS-SCNC: 9 MMOL/L (ref 5–18)
ANION GAP SERPL CALCULATED.3IONS-SCNC: 9 MMOL/L (ref 5–18)
BUN SERPL-MCNC: 15 MG/DL (ref 8–28)
BUN SERPL-MCNC: 15 MG/DL (ref 8–28)
CALCIUM SERPL-MCNC: 9.2 MG/DL (ref 8.5–10.5)
CALCIUM SERPL-MCNC: 9.2 MG/DL (ref 8.5–10.5)
CHLORIDE BLD-SCNC: 101 MMOL/L (ref 98–107)
CHLORIDE SERPLBLD-SCNC: 101 MMOL/L (ref 98–107)
CO2 SERPL-SCNC: 32 MMOL/L (ref 22–31)
CO2 SERPL-SCNC: 32 MMOL/L (ref 22–31)
CREAT SERPL-MCNC: 0.63 MG/DL (ref 0.6–1.1)
CREAT SERPL-MCNC: 0.63 MG/DL (ref 0.6–1.1)
GFR SERPL CREATININE-BSD FRML MDRD: >60 ML/MIN/1.73M2
GFR SERPL CREATININE-BSD FRML MDRD: >60 ML/MIN/1.73M2
GLUCOSE BLD-MCNC: 92 MG/DL (ref 70–125)
GLUCOSE SERPL-MCNC: 92 MG/DL (ref 70–125)
POTASSIUM BLD-SCNC: 3.5 MMOL/L (ref 3.5–5)
POTASSIUM SERPL-SCNC: 3.5 MMOL/L (ref 3.5–5)
SODIUM SERPL-SCNC: 142 MMOL/L (ref 136–145)
SODIUM SERPL-SCNC: 142 MMOL/L (ref 136–145)

## 2020-06-03 ENCOUNTER — VIRTUAL VISIT (OUTPATIENT)
Dept: GERIATRICS | Facility: CLINIC | Age: 80
End: 2020-06-03
Payer: COMMERCIAL

## 2020-06-03 VITALS
OXYGEN SATURATION: 97 % | HEART RATE: 71 BPM | BODY MASS INDEX: 24.89 KG/M2 | DIASTOLIC BLOOD PRESSURE: 95 MMHG | SYSTOLIC BLOOD PRESSURE: 177 MMHG | TEMPERATURE: 98.5 F | WEIGHT: 154.2 LBS | RESPIRATION RATE: 18 BRPM

## 2020-06-03 DIAGNOSIS — M54.41 ACUTE BILATERAL LOW BACK PAIN WITH BILATERAL SCIATICA: Primary | ICD-10-CM

## 2020-06-03 DIAGNOSIS — Z79.899 POLYPHARMACY: ICD-10-CM

## 2020-06-03 DIAGNOSIS — F43.23 ADJUSTMENT DISORDER WITH MIXED ANXIETY AND DEPRESSED MOOD: ICD-10-CM

## 2020-06-03 DIAGNOSIS — M54.42 ACUTE BILATERAL LOW BACK PAIN WITH BILATERAL SCIATICA: Primary | ICD-10-CM

## 2020-06-03 DIAGNOSIS — F33.9 EPISODE OF RECURRENT MAJOR DEPRESSIVE DISORDER, UNSPECIFIED DEPRESSION EPISODE SEVERITY (H): ICD-10-CM

## 2020-06-03 DIAGNOSIS — I10 ESSENTIAL HYPERTENSION: ICD-10-CM

## 2020-06-03 DIAGNOSIS — I71.40 ANEURYSM OF ABDOMINAL VESSEL (H): ICD-10-CM

## 2020-06-03 DIAGNOSIS — F41.1 GAD (GENERALIZED ANXIETY DISORDER): ICD-10-CM

## 2020-06-03 DIAGNOSIS — K59.04 CHRONIC IDIOPATHIC CONSTIPATION: ICD-10-CM

## 2020-06-03 PROCEDURE — 99316 NF DSCHRG MGMT 30 MIN+: CPT | Mod: GT | Performed by: NURSE PRACTITIONER

## 2020-06-03 NOTE — TELEPHONE ENCOUNTER
Eladio stated they changed her sertraline back to 75 mg. When she was in the hospital she had a reaction to the medications and had much confusion and they had to restrain her. She had morphine and oxycodone while in the hospital.  Her night terrors have also worsened since being in the hospital and Mehreen is afraid to fall asleep. She will kick and flail her arms in her sleep and wake up with bruises. Eladio is thinking the sertraline is the issue. She would like to find a PCP in our system for Mehreen to discuss switching her medications and possibly to cymbalta. She would like to ask Dr. Monterroso who he recommends at the visit on 6/9. She reports Mehreen is not really seeing the NP at the facility and the NP is covering for the provider who had left the practice.    Plan/recommendation:  1. Consult with Arianne Melendrez, Pharm D regarding medications and many concerns around sertraline.    2. Find a PCP for Mehreen in our system. Eladio would like to seek recommendations from Dr. Monterroso.

## 2020-06-03 NOTE — PROGRESS NOTES
"[facility] Geriatrics Video Visit  Mehreen Camara is a 80 year old female who is being evaluated via a billable video visit due to the restrictions of the COVID19 pandemic.The patient has been notified of following: \"This video visit will be conducted via a call between you and your provider. We have found that certain health care needs can be provided without the need for an in-person physical exam.  This service lets us provide the care you need with a video conversation.  If a prescription is necessary we can send it directly to your pharmacy.  If lab work is needed we can place an order for that and you can then stop by our lab to have the test done at a later time. If during the course of the call the provider feels a video visit is not appropriate, you will not be charged for this service.\"     Proivder has received verbal consent for a Video Visit from the patient? Yes    Patient/facility would like the video invitation sent by: Send to e-mail at: [email or web address]    This visit took place virtually, with the patient located at [facility].  ________________________________________________  Video Start Time: 1205  PATIENT'S NAME: Mehreen Camara  YOB: 1940  MEDICAL RECORD NUMBER:  [id number]  PRIMARY CARE PROVIDER AND CLINIC RESPONSIBLE AFTER TRANSFER: HAILEE Contreras CNP, [address] [place] [address] / [facility] [address]. [facility] Provider   Transferring providers: HAILEE Gilbert CNP DNP  Recent Hospitalization/ED:  [facility] stay 5/16 to 5/21.  Date of TCU Admission: May / 21 / 2020  Date of TCU (anticipated) Discharge: June / 04 / 2020  Discharged to: previous assisted living  Cognitive Scores: BIMS: 14/15 and CPT: 4.2/5.6  DME: None.  CODE STATUS/ADVANCE DIRECTIVES DISCUSSION: DNR/DNI.  ALLERGIES: Penicillins; Aspirin; Atenolol; Atorvastatin; Bupropion; Clindamycin; Codeine; Ibuprofen; Lovastatin; and Cephalexin    DISCHARGE " "DIAGNOSIS/NURSING FACILITY COURSE:   Acute bilateral low back pain with bilateral sciatica  Chronic idiopathic constipation  Adjustment disorder with mixed anxiety and depressed mood  VAMSHI (generalized anxiety disorder)  Episode of recurrent major depressive disorder, unspecified depression episode severity (H)  Essential hypertension  Aneurysm of abdominal vessel (H)  Polypharmacy  Mehreen presented to [facility] on 5/16 w/ acute-on-chronic back pain. Imaging showed moderate spinal canal narrowing. She was started on PO steroids, but then developed encephalopathy from steroids and increased opioid use. She also experienced hypotension, and her hydrochlorothiazide was stopped. She was evaluated by ortho, treated conservatively, started clearing cognitively, and discharged to TCU.     Upon arrival to TCU, we noted HTN, and we restarted her Hydrochlorothiazide at 12.5mg. Her recheck BMP was appropriate as noted below. We also noted significant polypharmacy, and we discontinued her MVI and Melatonin. Given her pain reports being more neuropathic in nature, and given daughter's demands for more opioid use, we instead increased Gabapentin. Upon follow-ip today, we still note HTN as noted below. Mehreen states her pain is well controlled and she intermittently still gets \"zingers\" down her right leg. She denies numbness/tingling. She denies CP, SOB, cough. Her constipation has resolved. She is sleeping well and has a good appetite. We note she is anxious and obsessive regarding her BP, which may be confounding factor in HTN as noted below:  BPs:  06/03/2020 15:59 174/84 mmHg  06/03/2020 07:25 177/95 mmHg   06/02/2020 16:57 149/100 mmHg   06/02/2020 07:08 174/89 mmHg   06/01/2020 17:32 194/81 mmHg   06/01/2020 08:12 158/93 mmHg   05/31/2020 15:42 160/90 mmHg   05/30/2020 15:58 180/81 mmHg  05/29/2020 19:32 137/73 mmHg   05/29/2020 08:46 147/83 mmHg  05/28/2020 19:06 189/109 mmHg   05/28/2020 07:24 176/93 mmHg   05/27/2020 " 15:58 100/68 mmHg   05/27/2020 10:48 95/60 mmHg   05/26/2020 19:27 145/78 mmHg   05/26/2020 15:02 174/99 mmHg  05/25/2020 18:33 156/89 mmHg   05/25/2020 07:17 170/100 mmHg  05/24/2020 07:47 130/68 mmHg   05/23/2020 16:38 130/78 mmHg <-- hydrochlorothiazide restarted.    Past Medical History:  has a past medical history of Adrenal adenoma, Arthritis, Depressive disorder, Hypertension, Rheumatic fever, Small bowel obstruction (H), and Thyroid disease.  Discharge Medications:  Current Outpatient Medications   Medication Sig Dispense Refill     acetaminophen (TYLENOL) 500 MG tablet Take 500-1,000 mg by mouth 3 times daily as needed for mild pain       acetaminophen (TYLENOL) 500 MG tablet Take 1 tablet (500 mg) by mouth 3 times daily  0     betamethasone dipropionate (DIPROSONE) 0.05 % external cream Apply topically 2 times daily as needed       bisacodyl (DULCOLAX) 10 MG suppository Place 10 mg rectally daily as needed for constipation       BISACODYL PO Take 5 mg by mouth daily       calcium carbonate (TUMS) 500 MG chewable tablet Take 1 tablet (500 mg) by mouth every 2 hours as needed for heartburn 150 tablet 1     carbidopa-levodopa (SINEMET)  MG tablet Take 2 tablets by mouth 3 times daily 0700, 1230, 1830       clindamycin (CLEOCIN) 300 MG capsule Take 600 mg by mouth as needed Give 1 hour prior to dental appoitments       diclofenac (VOLTAREN) 1 % topical gel Apply 4 g topically 4 times daily as needed for moderate pain (to back and neck) 100 g 11     gabapentin (NEURONTIN) 100 MG capsule Take 300 mg by mouth 3 times daily AM and noon       GAS RELIEF 125 MG CAPS TAKE 1 CAPSULE BY MOUTH FOUR TIMES DAILY AS NEEDED WITH MEALS 30 capsule 11     GAVILAX powder MIX 1 CAPFUL (17GM) IN LIQUID AND DRINK BY MOUTH TWICE DAILY DX CONSTIPATION;MIX 17GM IN LIQUID AND DRINK 1-2 TIMES A DAY AS NEEDED 510 g 11     hydrochlorothiazide (HYDRODIURIL) 25 MG tablet Take 0.5 tablets (12.5 mg) by mouth daily        HYDROcodone-acetaminophen (NORCO) 5-325 MG tablet TAKE 1 TABLET BY MOUTH TWICE DAILY (MAX APAP 4GM/24HRS);TAKE 1 TABLET BY MOUTH EVERY 4 HOURS AS NEEDED (MAX APAP 4GM/24HRS) 100 tablet 0     levothyroxine (SYNTHROID/LEVOTHROID) 137 MCG tablet TAKE 1 TABLET BY MOUTH EVERY DAY DX HYPOTHYROIDISM 30 tablet 11     linaclotide (LINZESS) 290 MCG capsule Take 290 mcg by mouth every morning (before breakfast)       losartan (COZAAR) 50 MG tablet TAKE TABLET BY MOUTH EVERY MORNING 30 tablet 11     magnesium citrate 1.745 GM/30ML solution Take 296 mLs by mouth daily as needed for other       mineral oil enema Place 1 enema rectally as needed for constipation 1 enema 0     naloxegol 25 MG TABS tablet Take 25 mg by mouth every morning (before breakfast)       pantoprazole (PROTONIX) 20 MG EC tablet Take 20 mg by mouth daily       polyethylene glycol (MIRALAX/GLYCOLAX) packet Take 17 g by mouth 2 times daily        polyethylene glycol (MIRALAX/GLYCOLAX) packet Take 17 g by mouth daily as needed for constipation       polyethylene glycol-propylene glycol (SYSTANE ULTRA) 0.4-0.3 % SOLN ophthalmic solution Place 2 drops into both eyes 4 times daily as needed for dry eyes       ranitidine (ZANTAC) 150 MG tablet TAKE 1 TABLET BY MOUTH DAILY AT BEDTIME DX GASTROESOPHAGEAL REFLUX DISEASE 30 tablet 11     sertraline (ZOLOFT) 25 MG tablet Take 3 tablets (75 mg) by mouth At Bedtime 60 tablet 0     triamcinolone (KENALOG) 0.1 % external cream Apply topically 2 times daily as needed for irritation       vitamin D3 (CHOLECALCIFEROL) 2000 units (50 mcg) tablet Take 2,000 Units by mouth daily       White Petrolatum (VASELINE) ointment Apply topically 3 times daily Apply to vulva 2-3 times a day 0700, 1200, 1830       Medication Changes/Rationale:     Hydrochlorothiazide restarted and now at 25mg/day.    Discontinue Melatonin    Discontinue MVI    Controlled medications sent with patient:   Medication: Norco , remaining tabs given to patient at  the time of discharge to take home     ROS: 4 point ROS including Respiratory, CV, GI and , other than that noted in the HPI, is negative.    Physical Exam: Vitals: BP (!) 177/95   Pulse 71   Temp 98.5  F (36.9  C)   Resp 18   Wt 69.9 kg (154 lb 3.2 oz)   SpO2 97%   BMI 24.89 kg/m  BMI= Body mass index is 24.89 kg/m .  GENERAL APPEARANCE: Alert, in no distress, cooperative.   EYES/ENT: EOM normal on camera, hearing acuity [place].  RESP: Respiratory effort appears unlabored over video screen, no respiratory distress apparent on video, On RA.   SKIN: Skin appears baseline w/ video inspection. No wounds/rashes noted.    NEURO: CN II-XII at patient's baseline, MARMOLEJO at baseline on video. Sensation baseline PPS.  PSYCH: Insight, judgement, and memory are baseline, affect and mood are happy/engaged.    Facility Labs:       DISCHARGE PLAN:    Follow up labs: No labs orders/due, but PCP may consider BMP secondary to hydrochlorothiazide changes.    Medical Follow Up: Follow up with primary care provider in 2-4 weeks    MTM referral needed and placed by this provider: Yes, recommended, will defer to PCP.    Current [place] scheduled appointments: None.    Discharge Services: Out Patient:  physical therapy and occupational therapy    TOTAL DISCHARGE TIME:  Greater than 30 minutes    Orders:  1. Increase hydrochlorothiazide from 12.5mg to 25mg PO QAM. Dx: HTN.     Video-Visit Details  Type of service:  Video Visit  Video Start Time: 1205  Video End Time (time video stopped): 1213  Originating Location (pt. Location): TCU  Distant Location (provider location):  GERIATRICS TRANSITIONAL CARE   Mode of Communication:  Video Conference.    Electronically signed by:  Dr. Lisa Parry, APRN CNP DNP

## 2020-06-03 NOTE — LETTER
"    6/3/2020        RE: Mehreen Camara  [facility]  [address]  [facility] [address]        [facility] Geriatrics Video Visit  Mehreen Camara is a 80 year old female who is being evaluated via a billable video visit due to the restrictions of the COVID19 pandemic.The patient has been notified of following: \"This video visit will be conducted via a call between you and your provider. We have found that certain health care needs can be provided without the need for an in-person physical exam.  This service lets us provide the care you need with a video conversation.  If a prescription is necessary we can send it directly to your pharmacy.  If lab work is needed we can place an order for that and you can then stop by our lab to have the test done at a later time. If during the course of the call the provider feels a video visit is not appropriate, you will not be charged for this service.\"     Proivder has received verbal consent for a Video Visit from the patient? Yes    Patient/facility would like the video invitation sent by: Send to e-mail at: [email or web address]    This visit took place virtually, with the patient located at [facility].  ________________________________________________  Video Start Time: 1205  PATIENT'S NAME: Mehreen Camara  YOB: 1940  MEDICAL RECORD NUMBER:  [id number]  PRIMARY CARE PROVIDER AND CLINIC RESPONSIBLE AFTER TRANSFER: HAILEE Contreras CNP, [address] [place] [address] / [facility] [address]. [facility] Provider   Transferring providers: HAILEE Gilbert CNP DNP  Recent Hospitalization/ED:  [facility] stay 5/16 to 5/21.  Date of TCU Admission: May / 21 / 2020  Date of TCU (anticipated) Discharge: June / 04 / 2020  Discharged to: previous assisted living  Cognitive Scores: BIMS: 14/15 and CPT: 4.2/5.6  DME: None.  CODE STATUS/ADVANCE DIRECTIVES DISCUSSION: DNR/DNI.  ALLERGIES: Penicillins; " "Aspirin; Atenolol; Atorvastatin; Bupropion; Clindamycin; Codeine; Ibuprofen; Lovastatin; and Cephalexin    DISCHARGE DIAGNOSIS/NURSING FACILITY COURSE:   Acute bilateral low back pain with bilateral sciatica  Chronic idiopathic constipation  Adjustment disorder with mixed anxiety and depressed mood  VAMSHI (generalized anxiety disorder)  Episode of recurrent major depressive disorder, unspecified depression episode severity (H)  Essential hypertension  Aneurysm of abdominal vessel (H)  Polypharmacy  Mehreen presented to [facility] on 5/16 w/ acute-on-chronic back pain. Imaging showed moderate spinal canal narrowing. She was started on PO steroids, but then developed encephalopathy from steroids and increased opioid use. She also experienced hypotension, and her hydrochlorothiazide was stopped. She was evaluated by ortho, treated conservatively, started clearing cognitively, and discharged to TCU.     Upon arrival to TCU, we noted HTN, and we restarted her Hydrochlorothiazide at 12.5mg. Her recheck BMP was appropriate as noted below. We also noted significant polypharmacy, and we discontinued her MVI and Melatonin. Given her pain reports being more neuropathic in nature, and given daughter's demands for more opioid use, we instead increased Gabapentin. Upon follow-ip today, we still note HTN as noted below. Mehreen states her pain is well controlled and she intermittently still gets \"zingers\" down her right leg. She denies numbness/tingling. She denies CP, SOB, cough. Her constipation has resolved. She is sleeping well and has a good appetite. We note she is anxious and obsessive regarding her BP, which may be confounding factor in HTN as noted below:  BPs:  06/03/2020 15:59 174/84 mmHg  06/03/2020 07:25 177/95 mmHg   06/02/2020 16:57 149/100 mmHg   06/02/2020 07:08 174/89 mmHg   06/01/2020 17:32 194/81 mmHg   06/01/2020 08:12 158/93 mmHg   05/31/2020 15:42 160/90 mmHg   05/30/2020 15:58 180/81 mmHg  05/29/2020 19:32 " 137/73 mmHg   05/29/2020 08:46 147/83 mmHg  05/28/2020 19:06 189/109 mmHg   05/28/2020 07:24 176/93 mmHg   05/27/2020 15:58 100/68 mmHg   05/27/2020 10:48 95/60 mmHg   05/26/2020 19:27 145/78 mmHg   05/26/2020 15:02 174/99 mmHg  05/25/2020 18:33 156/89 mmHg   05/25/2020 07:17 170/100 mmHg  05/24/2020 07:47 130/68 mmHg   05/23/2020 16:38 130/78 mmHg <-- hydrochlorothiazide restarted.    Past Medical History:  has a past medical history of Adrenal adenoma, Arthritis, Depressive disorder, Hypertension, Rheumatic fever, Small bowel obstruction (H), and Thyroid disease.  Discharge Medications:  Current Outpatient Medications   Medication Sig Dispense Refill     acetaminophen (TYLENOL) 500 MG tablet Take 500-1,000 mg by mouth 3 times daily as needed for mild pain       acetaminophen (TYLENOL) 500 MG tablet Take 1 tablet (500 mg) by mouth 3 times daily  0     betamethasone dipropionate (DIPROSONE) 0.05 % external cream Apply topically 2 times daily as needed       bisacodyl (DULCOLAX) 10 MG suppository Place 10 mg rectally daily as needed for constipation       BISACODYL PO Take 5 mg by mouth daily       calcium carbonate (TUMS) 500 MG chewable tablet Take 1 tablet (500 mg) by mouth every 2 hours as needed for heartburn 150 tablet 1     carbidopa-levodopa (SINEMET)  MG tablet Take 2 tablets by mouth 3 times daily 0700, 1230, 1830       clindamycin (CLEOCIN) 300 MG capsule Take 600 mg by mouth as needed Give 1 hour prior to dental appoitments       diclofenac (VOLTAREN) 1 % topical gel Apply 4 g topically 4 times daily as needed for moderate pain (to back and neck) 100 g 11     gabapentin (NEURONTIN) 100 MG capsule Take 300 mg by mouth 3 times daily AM and noon       GAS RELIEF 125 MG CAPS TAKE 1 CAPSULE BY MOUTH FOUR TIMES DAILY AS NEEDED WITH MEALS 30 capsule 11     GAVILAX powder MIX 1 CAPFUL (17GM) IN LIQUID AND DRINK BY MOUTH TWICE DAILY DX CONSTIPATION;MIX 17GM IN LIQUID AND DRINK 1-2 TIMES A DAY AS NEEDED 510 g  11     hydrochlorothiazide (HYDRODIURIL) 25 MG tablet Take 0.5 tablets (12.5 mg) by mouth daily       HYDROcodone-acetaminophen (NORCO) 5-325 MG tablet TAKE 1 TABLET BY MOUTH TWICE DAILY (MAX APAP 4GM/24HRS);TAKE 1 TABLET BY MOUTH EVERY 4 HOURS AS NEEDED (MAX APAP 4GM/24HRS) 100 tablet 0     levothyroxine (SYNTHROID/LEVOTHROID) 137 MCG tablet TAKE 1 TABLET BY MOUTH EVERY DAY DX HYPOTHYROIDISM 30 tablet 11     linaclotide (LINZESS) 290 MCG capsule Take 290 mcg by mouth every morning (before breakfast)       losartan (COZAAR) 50 MG tablet TAKE TABLET BY MOUTH EVERY MORNING 30 tablet 11     magnesium citrate 1.745 GM/30ML solution Take 296 mLs by mouth daily as needed for other       mineral oil enema Place 1 enema rectally as needed for constipation 1 enema 0     naloxegol 25 MG TABS tablet Take 25 mg by mouth every morning (before breakfast)       pantoprazole (PROTONIX) 20 MG EC tablet Take 20 mg by mouth daily       polyethylene glycol (MIRALAX/GLYCOLAX) packet Take 17 g by mouth 2 times daily        polyethylene glycol (MIRALAX/GLYCOLAX) packet Take 17 g by mouth daily as needed for constipation       polyethylene glycol-propylene glycol (SYSTANE ULTRA) 0.4-0.3 % SOLN ophthalmic solution Place 2 drops into both eyes 4 times daily as needed for dry eyes       ranitidine (ZANTAC) 150 MG tablet TAKE 1 TABLET BY MOUTH DAILY AT BEDTIME DX GASTROESOPHAGEAL REFLUX DISEASE 30 tablet 11     sertraline (ZOLOFT) 25 MG tablet Take 3 tablets (75 mg) by mouth At Bedtime 60 tablet 0     triamcinolone (KENALOG) 0.1 % external cream Apply topically 2 times daily as needed for irritation       vitamin D3 (CHOLECALCIFEROL) 2000 units (50 mcg) tablet Take 2,000 Units by mouth daily       White Petrolatum (VASELINE) ointment Apply topically 3 times daily Apply to vulva 2-3 times a day 0700, 1200, 1830       Medication Changes/Rationale:     Hydrochlorothiazide restarted and now at 25mg/day.    Discontinue Melatonin    Discontinue  MVI    Controlled medications sent with patient:   Medication: Norco , remaining tabs given to patient at the time of discharge to take home     ROS: 4 point ROS including Respiratory, CV, GI and , other than that noted in the HPI, is negative.    Physical Exam: Vitals: BP (!) 177/95   Pulse 71   Temp 98.5  F (36.9  C)   Resp 18   Wt 69.9 kg (154 lb 3.2 oz)   SpO2 97%   BMI 24.89 kg/m  BMI= Body mass index is 24.89 kg/m .  GENERAL APPEARANCE: Alert, in no distress, cooperative.   EYES/ENT: EOM normal on camera, hearing acuity [place].  RESP: Respiratory effort appears unlabored over video screen, no respiratory distress apparent on video, On RA.   SKIN: Skin appears baseline w/ video inspection. No wounds/rashes noted.    NEURO: CN II-XII at patient's baseline, MARMOLEJO at baseline on video. Sensation baseline PPS.  PSYCH: Insight, judgement, and memory are baseline, affect and mood are happy/engaged.    Facility Labs:       DISCHARGE PLAN:    Follow up labs: No labs orders/due, but PCP may consider BMP secondary to hydrochlorothiazide changes.    Medical Follow Up: Follow up with primary care provider in 2-4 weeks    MTM referral needed and placed by this provider: Yes, recommended, will defer to PCP.    Current [place] scheduled appointments: None.    Discharge Services: Out Patient:  physical therapy and occupational therapy    TOTAL DISCHARGE TIME:  Greater than 30 minutes    Orders:  1. Increase hydrochlorothiazide from 12.5mg to 25mg PO QAM. Dx: HTN.     Video-Visit Details  Type of service:  Video Visit  Video Start Time: 1205  Video End Time (time video stopped): 1213  Originating Location (pt. Location): TCU  Distant Location (provider location):  GERIATRICS TRANSITIONAL CARE   Mode of Communication:  Video Conference.    Electronically signed by:  HAILEE Gilbert CNP DNP          Sincerely,        HAILEE Langley CNP

## 2020-06-03 NOTE — PROGRESS NOTES
"[facility]   Mehreen Camara is being evaluated via a billable video visit due to the restrictions of the Covid-19 pandemic.   The patient has been notified of following:  ***\"This video visit will be conducted via a call between you and your provider. We have found that certain health care needs can be provided without the need for an in-person physical exam.  This service lets us provide the care you need with a video conversation. If during the course of the call the provider feels a video visit is not appropriate, you will not be charged for this service.\"   The provider has received verbal consent for a Video Visit from the patient or first contact? {YES-NO  Default Yes:4444::\"Yes\"}  Patient or facility staff would like the video invitation sent by: {s video visit invitation:151196::\"N/A \"}  Video Start Time: {video visit start time:152948}  {if you used Care Everywhere, delete this and enter \".fgscarewhere\"}  [facility] Record Number:  [id number]  Place of Location at the time of visit: [facility]}  Chief Complaint   Patient presents with     Video Visit     Regulatory     HPI:  Mehreen Camara  is a 80 year old (1940), who is being seen today for a Regulatory visit.  HPI information obtained from: {FGS HPI:810452}. Today's concern is:  {FGS DX:814305}  {NPs - Look and see if the PT is FVP by looking at \"Patient type\". IF SO - delete this message and then type \"(dot)fgsfvp\". Docs don't need to.}    Past Medical and Surgical History reviewed in Epic today.  MEDICATIONS:  {Providers Please double check the med list (in the plan section >> meds & orders tab) and Discontinue any of the meds flagged by the TC to be discontinued}  Current Outpatient Medications   Medication Sig Dispense Refill     acetaminophen (TYLENOL) 500 MG tablet Take 500-1,000 mg by mouth 3 times daily as needed for mild pain       acetaminophen (TYLENOL) 500 MG tablet Take " 1 tablet (500 mg) by mouth 3 times daily  0     betamethasone dipropionate (DIPROSONE) 0.05 % external cream Apply topically 2 times daily as needed       bisacodyl (DULCOLAX) 10 MG suppository Place 10 mg rectally daily as needed for constipation       BISACODYL PO Take 5 mg by mouth daily       calcium carbonate (TUMS) 500 MG chewable tablet Take 1 tablet (500 mg) by mouth every 2 hours as needed for heartburn 150 tablet 1     carbidopa-levodopa (SINEMET)  MG tablet Take 2 tablets by mouth 3 times daily 0700, 1230, 1830       clindamycin (CLEOCIN) 300 MG capsule Take 600 mg by mouth as needed Give 1 hour prior to dental appoitments       diclofenac (VOLTAREN) 1 % topical gel Apply 4 g topically 4 times daily as needed for moderate pain (to back and neck) 100 g 11     gabapentin (NEURONTIN) 100 MG capsule Take 300 mg by mouth 3 times daily AM and noon       GAS RELIEF 125 MG CAPS TAKE 1 CAPSULE BY MOUTH FOUR TIMES DAILY AS NEEDED WITH MEALS 30 capsule 11     GAVILAX powder MIX 1 CAPFUL (17GM) IN LIQUID AND DRINK BY MOUTH TWICE DAILY DX CONSTIPATION;MIX 17GM IN LIQUID AND DRINK 1-2 TIMES A DAY AS NEEDED 510 g 11     hydrochlorothiazide (HYDRODIURIL) 25 MG tablet Take 0.5 tablets (12.5 mg) by mouth daily       HYDROcodone-acetaminophen (NORCO) 5-325 MG tablet TAKE 1 TABLET BY MOUTH TWICE DAILY (MAX APAP 4GM/24HRS);TAKE 1 TABLET BY MOUTH EVERY 4 HOURS AS NEEDED (MAX APAP 4GM/24HRS) 100 tablet 0     levothyroxine (SYNTHROID/LEVOTHROID) 137 MCG tablet TAKE 1 TABLET BY MOUTH EVERY DAY DX HYPOTHYROIDISM 30 tablet 11     linaclotide (LINZESS) 290 MCG capsule Take 290 mcg by mouth every morning (before breakfast)       losartan (COZAAR) 50 MG tablet TAKE TABLET BY MOUTH EVERY MORNING 30 tablet 11     magnesium citrate 1.745 GM/30ML solution Take 296 mLs by mouth daily as needed for other       mineral oil enema Place 1 enema rectally as needed for constipation 1 enema 0     naloxegol 25 MG TABS tablet Take 25 mg by  mouth every morning (before breakfast)       pantoprazole (PROTONIX) 20 MG EC tablet Take 20 mg by mouth daily       polyethylene glycol (MIRALAX/GLYCOLAX) packet Take 17 g by mouth 2 times daily        polyethylene glycol (MIRALAX/GLYCOLAX) packet Take 17 g by mouth daily as needed for constipation       polyethylene glycol-propylene glycol (SYSTANE ULTRA) 0.4-0.3 % SOLN ophthalmic solution Place 2 drops into both eyes 4 times daily as needed for dry eyes       ranitidine (ZANTAC) 150 MG tablet TAKE 1 TABLET BY MOUTH DAILY AT BEDTIME DX GASTROESOPHAGEAL REFLUX DISEASE 30 tablet 11     sertraline (ZOLOFT) 25 MG tablet Take 3 tablets (75 mg) by mouth At Bedtime 60 tablet 0     triamcinolone (KENALOG) 0.1 % external cream Apply topically 2 times daily as needed for irritation       vitamin D3 (CHOLECALCIFEROL) 2000 units (50 mcg) tablet Take 2,000 Units by mouth daily       White Petrolatum (VASELINE) ointment Apply topically 3 times daily Apply to vulva 2-3 times a day 0700, 1200, 1830     ***  REVIEW OF SYSTEMS: {ROS FGS:580808}  Objective: BP (!) 177/95   Pulse 71   Temp 98.5  F (36.9  C)   Resp 18   Wt 69.9 kg (154 lb 3.2 oz)   SpO2 97%   BMI 24.89 kg/m    Limited visit exam done given COVID-19 precautions.   {Nursing home physical exam :197283}  Labs:   {fgslab:205587}    ASSESSMENT/PLAN:  {FGS DX:221476}    {fgsorders:665623}  ***  Electronically signed by:  Te Sargent ***  {Providers Please double check the med list (in the plan section >> meds & orders tab) and Discontinue any of the meds flagged by the TC to be discontinued}  Video-Visit Details  Type of service:  Video Visit  Video End Time (time video stopped): ***  Distant Location (provider location):  [facility]

## 2020-06-03 NOTE — TELEPHONE ENCOUNTER
Sertraline can cause dizziness but does not generally cause a lowering of blood pressure. The dose change of sertraline from 75 mg to 150 mg is pretty significant but I don't think that it is necessarily causing the large blood pressure fluctuations; I think that is more likely due to MSA, carbidopa-levodopa, or her blood pressure lowering medications.     Arianne Melendrez, Pharm.D.  Medication Therapy Management Pharmacist  Phone: [phone]

## 2020-06-04 RX ORDER — HYDROCHLOROTHIAZIDE 25 MG/1
25 TABLET ORAL DAILY
Start: 2020-06-04 | End: 2020-08-19

## 2020-06-09 ENCOUNTER — VIRTUAL VISIT (OUTPATIENT)
Dept: NEUROLOGY | Facility: CLINIC | Age: 80
End: 2020-06-09
Payer: MEDICAID

## 2020-06-09 DIAGNOSIS — G23.2 MULTIPLE SYSTEM ATROPHY P (H): Primary | ICD-10-CM

## 2020-06-09 NOTE — PROGRESS NOTES
"MOVEMENT DISORDERS TELEMEDICINE VISIT:     PATIENT: Mehreen Camara    : 1940    DATE: 2020    REASON FOR VISIT: follow up MSA-P    After a review of the patient s situation, this visit was changed from an in-person visit to a Telemedicine visit to reduce the risk of COVID-19 exposure. The patient is being evaluated via a billable Telemedicine visit.    Ms. Camara is a 80 year old woman with MSA-P. Her last visit was 2019. She reports that she has low energy and since that time. Other concerns include orthostatic hypotension. She says she drinks \"a lot\" of water. She feels better after taking C/L 2 tabs TID - she has more \"get up and go\" than when she skips a dose.    Daughter, Eladio, also reports an increase in night terrors. She wonders if sertraline could be contributing. Unclear if she snores or has apneic episodes overnight. By report, she talks loudly in her sleep.    She denies abnormal movements. She recently had shoulder injections. Denies dysphagia to solids or liquids.     I have reviewed and updated the patient's Past Medical History, Social History, Family History and Medication List.    Medications:  Current Outpatient Medications   Medication Sig Dispense Refill     acetaminophen (TYLENOL) 500 MG tablet Take 500-1,000 mg by mouth 3 times daily as needed for mild pain       acetaminophen (TYLENOL) 500 MG tablet Take 1 tablet (500 mg) by mouth 3 times daily  0     betamethasone dipropionate (DIPROSONE) 0.05 % external cream Apply topically 2 times daily as needed       bisacodyl (DULCOLAX) 10 MG suppository Place 10 mg rectally daily as needed for constipation       BISACODYL PO Take 5 mg by mouth daily       calcium carbonate (TUMS) 500 MG chewable tablet Take 1 tablet (500 mg) by mouth every 2 hours as needed for heartburn 150 tablet 1     carbidopa-levodopa (SINEMET)  MG tablet Take 2 tablets by mouth 3 times daily 0700, 1230, 1830       clindamycin (CLEOCIN) 300 MG " capsule Take 600 mg by mouth as needed Give 1 hour prior to dental appoitments       diclofenac (VOLTAREN) 1 % topical gel Apply 4 g topically 4 times daily as needed for moderate pain (to back and neck) 100 g 11     gabapentin (NEURONTIN) 100 MG capsule Take 300 mg by mouth 3 times daily AM and noon       GAS RELIEF 125 MG CAPS TAKE 1 CAPSULE BY MOUTH FOUR TIMES DAILY AS NEEDED WITH MEALS 30 capsule 11     GAVILAX powder MIX 1 CAPFUL (17GM) IN LIQUID AND DRINK BY MOUTH TWICE DAILY DX CONSTIPATION;MIX 17GM IN LIQUID AND DRINK 1-2 TIMES A DAY AS NEEDED 510 g 11     hydrochlorothiazide (HYDRODIURIL) 25 MG tablet Take 1 tablet (25 mg) by mouth daily       HYDROcodone-acetaminophen (NORCO) 5-325 MG tablet TAKE 1 TABLET BY MOUTH TWICE DAILY (MAX APAP 4GM/24HRS);TAKE 1 TABLET BY MOUTH EVERY 4 HOURS AS NEEDED (MAX APAP 4GM/24HRS) 100 tablet 0     levothyroxine (SYNTHROID/LEVOTHROID) 137 MCG tablet TAKE 1 TABLET BY MOUTH EVERY DAY DX HYPOTHYROIDISM 30 tablet 11     linaclotide (LINZESS) 290 MCG capsule Take 290 mcg by mouth every morning (before breakfast)       losartan (COZAAR) 50 MG tablet TAKE TABLET BY MOUTH EVERY MORNING 30 tablet 11     magnesium citrate 1.745 GM/30ML solution Take 296 mLs by mouth daily as needed for other       mineral oil enema Place 1 enema rectally as needed for constipation 1 enema 0     naloxegol 25 MG TABS tablet Take 25 mg by mouth every morning (before breakfast)       pantoprazole (PROTONIX) 20 MG EC tablet Take 20 mg by mouth daily       polyethylene glycol (MIRALAX/GLYCOLAX) packet Take 17 g by mouth 2 times daily        polyethylene glycol (MIRALAX/GLYCOLAX) packet Take 17 g by mouth daily as needed for constipation       polyethylene glycol-propylene glycol (SYSTANE ULTRA) 0.4-0.3 % SOLN ophthalmic solution Place 2 drops into both eyes 4 times daily as needed for dry eyes       ranitidine (ZANTAC) 150 MG tablet TAKE 1 TABLET BY MOUTH DAILY AT BEDTIME DX GASTROESOPHAGEAL REFLUX DISEASE  30 tablet 11     sertraline (ZOLOFT) 25 MG tablet Take 3 tablets (75 mg) by mouth At Bedtime 60 tablet 0     triamcinolone (KENALOG) 0.1 % external cream Apply topically 2 times daily as needed for irritation       vitamin D3 (CHOLECALCIFEROL) 2000 units (50 mcg) tablet Take 2,000 Units by mouth daily       White Petrolatum (VASELINE) ointment Apply topically 3 times daily Apply to vulva 2-3 times a day 0700, 1200, 1830          Movement Disorder Medications                   am noon pm             C/L 25/100                                        2 tab  2 tab 2 tab                                                                                   Allergies:  Penicillins; Aspirin; Atenolol; Atorvastatin; Bupropion; Clindamycin; Codeine; Ibuprofen; Lovastatin; and Cephalexin    Physical Exam:  GENERAL: alert, active, attentive, appropriately groomed     Neurologic Exam:  MENTAL STATUS: Alert and oriented to person, place. Follows commands.  Fund of knowledge intact to current events. Able to recall 3/3 objects.   SPEECH: Fluent, intact comprehension and articulation  CN: Full EOM  MOTOR: Moves all extremities equally against gravity without difficulty. Minimal tremor with outstretched hands, no resting tremor appreciated. Slightly slower R hand opening and closing, smaller amplitude finger tapping on R. Able to rise from chair and walk with walker.    Assessment:  Mehreen Camara is a 80 year old female with MSA-P. She has had no significant disease progression since the last visit in 11/2019. She reports increased energy after taking C/L 2 tabs TID. She had an increase in what sounds like RBD since her recent TCU admission, where they increased her sertraline to 75 mg at bedtime. They are also concerned that sertraline may be causing hypotension. Sertraline should not be affecting her BP but it could be worsening RBD. We discussed strategies for hydration including taking fluids as a bolus.    Impression:  1.   Multiple system atrophy-P  2.  No signs of significant progression  3.  REM Sleep Behavior Disorder      Recommendations:  1.  Continue carbidopa/levodopa, 25/100, 2 tablets 3 times a day  2.  Check sitting and standing blood pressures at least weekly and record  3.  Trial of low dose melatonin 1 mg at bed time  4.  Recommend reducing Sertraline to 25 mg and dosing in the morning  5.  Continue to drink at least 7-8 cups of fluid each day. The first cup of fluid should be something salty like tomato juice. When doing an activity, you should take a bolus of fluid (ie drink the entire cup at one time).    Follow up in 6 months or sooner as needed.    Agatha Infante   Neurology PGY4    I have reviewed the note as documented above.  This accurately captures the substance of my conversation with the patient.    Chris Monterroso MD    Time was a feldamn factor in today's visit, and greater than 50% of this 28 minute visit was spent discussing the therapeutic plan, counseling, and coordinating care.  20 minutes were spent in documentation review on June 9, 2020.    Phone call contact time  Call Started at 0857  Call Ended at 0925    Patient seen and plan of care discussed with Dr. Monterroso, who was involved throughout the entirety of this TeleHealth visit.

## 2020-06-09 NOTE — PROGRESS NOTES
"Mehreen Camara is a 80 year old female who is being evaluated via a billable video visit.      The patient has been notified of following:     \"This video visit will be conducted via a call between you and your physician/provider. We have found that certain health care needs can be provided without the need for an in-person physical exam.  This service lets us provide the care you need with a video conversation.  If a prescription is necessary we can send it directly to your pharmacy.  If lab work is needed we can place an order for that and you can then stop by our lab to have the test done at a later time.    Video visits are billed at different rates depending on your insurance coverage.  Please reach out to your insurance provider with any questions.    If during the course of the call the physician/provider feels a video visit is not appropriate, you will not be charged for this service.\"    Patient has given verbal consent for Video visit? Yes    How would you like to obtain your AVS? Mail a copy    Patient would like the video invitation sent by: Text to cell phone: [phone]    Will anyone else be joining your video visit? Yes: Daughter Eladio. How would they like to receive their invitation? Text to cell phone: [phone] (Unable to get in contact to make sure she is set up for the video visit)  {If patient encounters technical issues they should call [phone] :452257}      Video-Visit Details    Type of service:  Video Visit    Video Start Time: {video visit start/end time:152948}  Video End Time: {video visit start/end time:152948}    Originating Location (pt. Location): {video visit patient location:285759::\"Home\"}    Distant Location (provider location):  [facility]     Platform used for Video Visit: {Virtual Visit Platforms:464048::\"LOC&ALL\"}    {signature options:920683}        "

## 2020-06-09 NOTE — PATIENT INSTRUCTIONS
1.  Continue carbidopa/levodopa, 25/100, 2 tablets 3 times a day  2.  Check sitting and standing blood pressures at least weekly and record  3.  Trial of low dose melatonin 1 mg at bed time  4.  Recommend reducing Sertraline to 25 mg and dosing in the morning  5.  Continue to drink at least 7-8 cups of fluid each day. The first cup of fluid should be something salty like tomato juice. When doing an activity, you should take a bolus of fluid (ie drink the entire cup at one time).    Please fax to [facility] at [phone].    Please fax AVS to Eladio' personal fax at [phone].

## 2020-06-09 NOTE — LETTER
"2020     RE: Mehreen Camara  [facility]  [address]  [facility] [address]     Dear Colleague,    Thank you for referring your patient, Mehreen Camara, to the [facility] at [facility]. Please see a copy of my visit note below.    MOVEMENT DISORDERS TELEMEDICINE VISIT:     PATIENT: Mehreen Camara    : 1940    DATE: 2020    REASON FOR VISIT: follow up MSA-P    After a review of the patient s situation, this visit was changed from an in-person visit to a Telemedicine visit to reduce the risk of COVID-19 exposure. The patient is being evaluated via a billable Telemedicine visit.    Ms. Cmaara is a 80 year old woman with MSA-P. Her last visit was 2019. She reports that she has low energy and since that time. Other concerns include orthostatic hypotension. She says she drinks \"a lot\" of water. She feels better after taking C/L 2 tabs TID - she has more \"get up and go\" than when she skips a dose.    Daughter, Eladio, also reports an increase in night terrors. She wonders if sertraline could be contributing. Unclear if she snores or has apneic episodes overnight. By report, she talks loudly in her sleep.    She denies abnormal movements. She recently had shoulder injections. Denies dysphagia to solids or liquids.     I have reviewed and updated the patient's Past Medical History, Social History, Family History and Medication List.    Medications:  Current Outpatient Medications   Medication Sig Dispense Refill     acetaminophen (TYLENOL) 500 MG tablet Take 500-1,000 mg by mouth 3 times daily as needed for mild pain       acetaminophen (TYLENOL) 500 MG tablet Take 1 tablet (500 mg) by mouth 3 times daily  0     betamethasone dipropionate (DIPROSONE) 0.05 % external cream Apply topically 2 times daily as needed       bisacodyl (DULCOLAX) 10 MG suppository Place 10 mg rectally daily as needed for constipation       " BISACODYL PO Take 5 mg by mouth daily       calcium carbonate (TUMS) 500 MG chewable tablet Take 1 tablet (500 mg) by mouth every 2 hours as needed for heartburn 150 tablet 1     carbidopa-levodopa (SINEMET)  MG tablet Take 2 tablets by mouth 3 times daily 0700, 1230, 1830       clindamycin (CLEOCIN) 300 MG capsule Take 600 mg by mouth as needed Give 1 hour prior to dental appoitments       diclofenac (VOLTAREN) 1 % topical gel Apply 4 g topically 4 times daily as needed for moderate pain (to back and neck) 100 g 11     gabapentin (NEURONTIN) 100 MG capsule Take 300 mg by mouth 3 times daily AM and noon       GAS RELIEF 125 MG CAPS TAKE 1 CAPSULE BY MOUTH FOUR TIMES DAILY AS NEEDED WITH MEALS 30 capsule 11     GAVILAX powder MIX 1 CAPFUL (17GM) IN LIQUID AND DRINK BY MOUTH TWICE DAILY DX CONSTIPATION;MIX 17GM IN LIQUID AND DRINK 1-2 TIMES A DAY AS NEEDED 510 g 11     hydrochlorothiazide (HYDRODIURIL) 25 MG tablet Take 1 tablet (25 mg) by mouth daily       HYDROcodone-acetaminophen (NORCO) 5-325 MG tablet TAKE 1 TABLET BY MOUTH TWICE DAILY (MAX APAP 4GM/24HRS);TAKE 1 TABLET BY MOUTH EVERY 4 HOURS AS NEEDED (MAX APAP 4GM/24HRS) 100 tablet 0     levothyroxine (SYNTHROID/LEVOTHROID) 137 MCG tablet TAKE 1 TABLET BY MOUTH EVERY DAY DX HYPOTHYROIDISM 30 tablet 11     linaclotide (LINZESS) 290 MCG capsule Take 290 mcg by mouth every morning (before breakfast)       losartan (COZAAR) 50 MG tablet TAKE TABLET BY MOUTH EVERY MORNING 30 tablet 11     magnesium citrate 1.745 GM/30ML solution Take 296 mLs by mouth daily as needed for other       mineral oil enema Place 1 enema rectally as needed for constipation 1 enema 0     naloxegol 25 MG TABS tablet Take 25 mg by mouth every morning (before breakfast)       pantoprazole (PROTONIX) 20 MG EC tablet Take 20 mg by mouth daily       polyethylene glycol (MIRALAX/GLYCOLAX) packet Take 17 g by mouth 2 times daily        polyethylene glycol (MIRALAX/GLYCOLAX) packet Take 17 g by  mouth daily as needed for constipation       polyethylene glycol-propylene glycol (SYSTANE ULTRA) 0.4-0.3 % SOLN ophthalmic solution Place 2 drops into both eyes 4 times daily as needed for dry eyes       ranitidine (ZANTAC) 150 MG tablet TAKE 1 TABLET BY MOUTH DAILY AT BEDTIME DX GASTROESOPHAGEAL REFLUX DISEASE 30 tablet 11     sertraline (ZOLOFT) 25 MG tablet Take 3 tablets (75 mg) by mouth At Bedtime 60 tablet 0     triamcinolone (KENALOG) 0.1 % external cream Apply topically 2 times daily as needed for irritation       vitamin D3 (CHOLECALCIFEROL) 2000 units (50 mcg) tablet Take 2,000 Units by mouth daily       White Petrolatum (VASELINE) ointment Apply topically 3 times daily Apply to vulva 2-3 times a day 0700, 1200, 1830          Movement Disorder Medications                   am noon pm             C/L 25/100                                        2 tab  2 tab 2 tab                                                                                   Allergies:  Penicillins; Aspirin; Atenolol; Atorvastatin; Bupropion; Clindamycin; Codeine; Ibuprofen; Lovastatin; and Cephalexin    Physical Exam:  GENERAL: alert, active, attentive, appropriately groomed     Neurologic Exam:  MENTAL STATUS: Alert and oriented to person, place. Follows commands.  Fund of knowledge intact to current events. Able to recall 3/3 objects.   SPEECH: Fluent, intact comprehension and articulation  CN: Full EOM  MOTOR: Moves all extremities equally against gravity without difficulty. Minimal tremor with outstretched hands, no resting tremor appreciated. Slightly slower R hand opening and closing, smaller amplitude finger tapping on R. Able to rise from chair and walk with walker.    Assessment:  Mehreen Camara is a 80 year old female with MSA-P. She has had no significant disease progression since the last visit in 11/2019. She reports increased energy after taking C/L 2 tabs TID. She had an increase in what sounds like RBD since her  recent TCU admission, where they increased her sertraline to 75 mg at bedtime. They are also concerned that sertraline may be causing hypotension. Sertraline should not be affecting her BP but it could be worsening RBD. We discussed strategies for hydration including taking fluids as a bolus.    Impression:  1.  Multiple system atrophy-P  2.  No signs of significant progression  3.  REM Sleep Behavior Disorder      Recommendations:  1.  Continue carbidopa/levodopa, 25/100, 2 tablets 3 times a day  2.  Check sitting and standing blood pressures at least weekly and record  3.  Trial of low dose melatonin 1 mg at bed time  4.  Recommend reducing Sertraline to 25 mg and dosing in the morning  5.  Continue to drink at least 7-8 cups of fluid each day. The first cup of fluid should be something salty like tomato juice. When doing an activity, you should take a bolus of fluid (ie drink the entire cup at one time).    Follow up in 6 months or sooner as needed.    Agatha Infante   Neurology PGY4    I have reviewed the note as documented above.  This accurately captures the substance of my conversation with the patient.    Chris Monterroso MD    Time was a feldman factor in today's visit, and greater than 50% of this 28 minute visit was spent discussing the therapeutic plan, counseling, and coordinating care.  20 minutes were spent in documentation review on June 9, 2020.    Phone call contact time  Call Started at 0857  Call Ended at 0925    Patient seen and plan of care discussed with Dr. Monterroso, who was involved throughout the entirety of this TeleHealth visit.     Again, thank you for allowing me to participate in the care of your patient.      Sincerely,    Chris Monterroso MD

## 2020-06-10 PROBLEM — G23.2: Status: ACTIVE | Noted: 2018-11-14

## 2020-06-15 ENCOUNTER — ALLIED HEALTH/NURSE VISIT (OUTPATIENT)
Dept: PHARMACY | Facility: CLINIC | Age: 80
End: 2020-06-15
Payer: MEDICAID

## 2020-06-15 DIAGNOSIS — G90.3 MULTIPLE SYSTEM ATROPHY (H): Primary | ICD-10-CM

## 2020-06-15 DIAGNOSIS — K22.70 BARRETT'S ESOPHAGUS WITHOUT DYSPLASIA: ICD-10-CM

## 2020-06-15 DIAGNOSIS — G47.52 RBD (REM BEHAVIORAL DISORDER): ICD-10-CM

## 2020-06-15 DIAGNOSIS — E55.9 VITAMIN D DEFICIENCY: ICD-10-CM

## 2020-06-15 DIAGNOSIS — F33.9 EPISODE OF RECURRENT MAJOR DEPRESSIVE DISORDER, UNSPECIFIED DEPRESSION EPISODE SEVERITY (H): ICD-10-CM

## 2020-06-15 DIAGNOSIS — E03.9 HYPOTHYROIDISM, UNSPECIFIED TYPE: ICD-10-CM

## 2020-06-15 DIAGNOSIS — H04.123 DRY EYES: ICD-10-CM

## 2020-06-15 DIAGNOSIS — G23.8 MULTIPLE SYSTEM ATROPHY (H): Primary | ICD-10-CM

## 2020-06-15 DIAGNOSIS — R52 PAIN: ICD-10-CM

## 2020-06-15 DIAGNOSIS — K59.01 SLOW TRANSIT CONSTIPATION: ICD-10-CM

## 2020-06-15 DIAGNOSIS — I10 ESSENTIAL HYPERTENSION: ICD-10-CM

## 2020-06-15 PROCEDURE — 99607 MTMS BY PHARM ADDL 15 MIN: CPT | Performed by: PHARMACIST

## 2020-06-15 PROCEDURE — 99605 MTMS BY PHARM NP 15 MIN: CPT | Performed by: PHARMACIST

## 2020-06-15 NOTE — PROGRESS NOTES
MTM ENCOUNTER  SUBJECTIVE/OBJECTIVE:                           Mehreen Camara is a 80 year old female called for an initial visit in neurology (follow up to visit with Lurdes Signh 3/5/19). She was referred to me from Dr. Monterroso. Today's visit was conducted with patient's daughter, Eladio, due to cognitive impairment.     Eladio requests to be added as a proxy to the patient's MyChart, as her sister, Mi, who normally accesses MyChart, is currently ill. Attempted to call patient to inquire about adding Eladio as MyChart proxy but she hung up during the call.     Patient consented to a telehealth visit: yes  Telemedicine Visit Details  Type of service:  Telephone visit  Start Time: 9:35 am  End Time: 10:28 AM  Originating Location (pt. Location): Home  Distant Location (provider location):  [facility] [place]  Mode of Communication:  Telephone    Chief Complaint: medication review; concerns about sertraline and constipation     Allergies/ADRs: Reviewed in EHR  Tobacco:  reports that she quit smoking about 25 years ago. Her smoking use included cigarettes. She smoked 0.50 packs per day. She has never used smokeless tobacco.  Alcohol: none  Caffeine: not discussed  Activity: not discussed  PMH: Reviewed in EHR    Medication Adherence/Access:   Patient has assistance with medication administration: assisted living.  Patient takes medications 2 time(s) per day: 7 am, 12:30 pm, and 6:30 pm  Per patient, misses medication 0 times per week.   Medication barriers: none.   The patient fills medications at [place]: NO, fills medications at [facility].    MSA: Currently taking carbidopa-levodopa  mg at 7 am, 12:30 pm, and 6:30 pm. Daughter states that patient is not able to move much due to pain and she has had an increase in falls lately; thus, they are concerned about risk of injury.     Depression/RBD: Currently taking sertraline 25 mg daily (recently decreased again). Daughter states that  "the patient's night terrors have been worse with sertraline so the dose has been slowly decreased. She is taking melatonin 1 mg nightly as well.     Constipation: Currently taking Miralax daily and bisacodyl 5 mg daily. She also has magnesium citrate solution and bisacodyl suppository if needed. She takes Gas-X as needed as well. It is unclear why Linzess and Movantik were discontinued as she was on these in the past.    Pain: Currently taking gabapentin 300 mg 3 times/day, hydrocodone-APAP 5-325 mg (about 3/day), acetaminophen 500 mg 3 times/day, and Voltaren gel 3-4 times/day. Daughter states the patient has been less mobile and is especially complaining of \"nerve pain\" in her back. Daughter states her legs were swollen last week when daughter visited her and she is seeing PCP tomorrow. Daughter doesn't think pain is well controlled and she is wondering if duloxetine would be an option for the patient. Cost was an issue with duloxetine in the past.     GERD/Harris's esophagus: Current medications include: Pantoprazole 20 mg daily and Tums as needed. Patient feels that current regimen is effective. She was recently switched from omeprazole to pantoprazole.     Hypertension: Current medications include hydrochlorothiazide 25 mg daily and losartan 50 mg daily.  Patient does not self-monitor BP.  Patient reports increased swelling in extremities. Daughter states her systolic blood pressure is occasionally dropping under 90 when sitting, not just when standing.     BP Readings from Last 3 Encounters:   06/16/20 (!) 156/85   06/03/20 (!) 177/95   05/22/20 (!) 208/118       Hypothyroidism: Patient is taking levothyroxine 137 mcg daily. Patient is having the following symptoms: none. No labs available in Epic.     Dry eyes: Uses Systane drops 2 drops 3 times/day and additionally as needed. Daughter states that assisted living staff is not bringing the eye drops to her as often as she would like.    Vitamin D deficiency: " Currently taking vitamin D3 2000 units daily. Vitamin D lab unavailable.     Today's Vitals: There were no vitals taken for this visit.  (telemedicine visit)    ASSESSMENT:                            Medication Adherence: excellent, no issues identified    MSA: Appears stable.      Depression/RBD: Needs improvement. Patient may benefit from taking a higher dose of melatonin to manage her vivid dreams/nightmares. Melatonin is first-line treatment for RBD. Additionally, patient may benefit from taking duloxetine instead of sertraline for pain and mood management. It seems the sertraline may have contributed to side effects including vivid dreams.     Constipation: Needs improvement. I was unable to fully assess the patient's constipation as I only spoke with the daughter today. I will defer to PCP who is seeing the patient tomorrow.     Pain: Needs improvement. Patient may benefit from a trial of duloxetine for pain management to minimize the need for opioids.     GERD/Harris's esophagus: Stable.     Hypertension: Appears stable. Seems the patient has had elevated BP's so I would not be inclined to taper back the antihypertensives at this time.     Hypothyroidism: Needs improvement. Patient due for TSH if not checked recently.      Dry eyes: Needs improvement. Patient may benefit from being able to self-administer the eye drops.    Vitamin D deficiency: Unable to assess without labs.    PLAN:                          Post Discharge Medication Reconciliation Status: discharge medications reconciled, continue medications without change.    1. Updated medication list per faxed copy from assisted living.  2. Consider allowing patient to self-administer artifical tear eye drops.   3. Consider increasing melatonin to 3 or 5 mg nightly for RBD.  4. Consideration for duloxetine instead of sertraline for mood and pain. Addendum 6/17/20: It appears that her PCP actually prescribed meloxicam for pain and dose of sertraline has  been further decreased.  5. Patient due for TSH lab.  6. Will request assistance from RN care coordinators in adding daughter Eladio as proxy on [place].     I spent 55 minutes with this patient today. I offer these suggestions for consideration by Dr. Monterroso. A copy of the visit note was provided to the patient's referring provider.    Will follow up in one month: 7/20/20.    The patient was sent via iCreate a summary of these recommendations.     Arianne Melendrez, Pharm.D.  Medication Therapy Management Pharmacist  Phone: [phone]

## 2020-06-16 ENCOUNTER — ASSISTED LIVING VISIT (OUTPATIENT)
Dept: GERIATRICS | Facility: CLINIC | Age: 80
End: 2020-06-16
Payer: COMMERCIAL

## 2020-06-16 VITALS
WEIGHT: 152 LBS | HEART RATE: 83 BPM | BODY MASS INDEX: 24.53 KG/M2 | DIASTOLIC BLOOD PRESSURE: 85 MMHG | RESPIRATION RATE: 20 BRPM | OXYGEN SATURATION: 95 % | TEMPERATURE: 97.2 F | SYSTOLIC BLOOD PRESSURE: 156 MMHG

## 2020-06-16 DIAGNOSIS — R60.0 BILATERAL LOWER EXTREMITY EDEMA: ICD-10-CM

## 2020-06-16 DIAGNOSIS — K59.04 CHRONIC IDIOPATHIC CONSTIPATION: ICD-10-CM

## 2020-06-16 DIAGNOSIS — R14.3 FLATULENCE, ERUCTATION, AND GAS PAIN: ICD-10-CM

## 2020-06-16 DIAGNOSIS — G89.29 CHRONIC BILATERAL LOW BACK PAIN WITH BILATERAL SCIATICA: ICD-10-CM

## 2020-06-16 DIAGNOSIS — M15.0 PRIMARY OSTEOARTHRITIS INVOLVING MULTIPLE JOINTS: ICD-10-CM

## 2020-06-16 DIAGNOSIS — F33.9 EPISODE OF RECURRENT MAJOR DEPRESSIVE DISORDER, UNSPECIFIED DEPRESSION EPISODE SEVERITY (H): ICD-10-CM

## 2020-06-16 DIAGNOSIS — F43.23 ADJUSTMENT DISORDER WITH MIXED ANXIETY AND DEPRESSED MOOD: Primary | ICD-10-CM

## 2020-06-16 DIAGNOSIS — G23.2 MULTIPLE SYSTEM ATROPHY P (H): ICD-10-CM

## 2020-06-16 DIAGNOSIS — B35.3 ATHLETE'S FOOT ON LEFT: ICD-10-CM

## 2020-06-16 DIAGNOSIS — M54.42 CHRONIC BILATERAL LOW BACK PAIN WITH BILATERAL SCIATICA: ICD-10-CM

## 2020-06-16 DIAGNOSIS — I10 ESSENTIAL HYPERTENSION: ICD-10-CM

## 2020-06-16 DIAGNOSIS — F41.1 GAD (GENERALIZED ANXIETY DISORDER): ICD-10-CM

## 2020-06-16 DIAGNOSIS — R14.2 FLATULENCE, ERUCTATION, AND GAS PAIN: ICD-10-CM

## 2020-06-16 DIAGNOSIS — M54.41 CHRONIC BILATERAL LOW BACK PAIN WITH BILATERAL SCIATICA: ICD-10-CM

## 2020-06-16 DIAGNOSIS — R14.1 FLATULENCE, ERUCTATION, AND GAS PAIN: ICD-10-CM

## 2020-06-16 RX ORDER — MELOXICAM 7.5 MG/1
7.5 TABLET ORAL DAILY
Start: 2020-06-16 | End: 2020-06-17

## 2020-06-16 NOTE — PROGRESS NOTES
[facility]  [place] Medical Record Number:  [id number]  Place of Service where encounter took place:  [facility] ([facility]) [[id number]]  Chief Complaint   Patient presents with     RECHECK       HPI:    Mehreen Camara  is a 80 year old (1940), who is being seen today for an episodic care visit.  HPI information obtained from: facility chart records, facility staff, patient report and [facility] chart review. Today's concern is:     Adjustment disorder with mixed anxiety and depressed mood  VAMSHI (generalized anxiety disorder)  Episode of recurrent major depressive disorder, unspecified depression episode severity (H)  Essential hypertension  Chronic bilateral low back pain with bilateral sciatica  Primary osteoarthritis involving multiple joints  Athlete's foot on left  Chronic idiopathic constipation  Flatulence, eructation, and gas pain  Multiple system atrophy P (H)  Bilateral lower extremity edema     Mehreen reports she continues to have significant pain in shoulders, back, across her abdomen, no shooting pain described.  She reports she continues to have difficulty with constipation which is troubling to her. She reports itching and painful L foot.  Nursing reports no other new concerns .       Past Medical and Surgical History reviewed in Epic today.    MEDICATIONS:    Current Outpatient Medications   Medication Sig Dispense Refill     acetaminophen (TYLENOL) 500 MG tablet Take 500-1,000 mg by mouth 3 times daily as needed for mild pain       bisacodyl (DULCOLAX) 10 MG suppository Place 10 mg rectally daily as needed for constipation       BISACODYL PO Take 5 mg by mouth daily       calcium carbonate (TUMS) 500 MG chewable tablet Take 1 tablet (500 mg) by mouth every 2 hours as needed for heartburn 150 tablet 1     carbidopa-levodopa (SINEMET)  MG tablet Take 2 tablets by mouth 3 times daily 0700, 1230, 1830       clindamycin (CLEOCIN) 300 MG capsule Take 600 mg by mouth as  needed Give 1 hour prior to dental appoitments       diclofenac (VOLTAREN) 1 % topical gel Apply 4 g topically 4 times daily as needed for moderate pain (to back and neck) 100 g 11     gabapentin (NEURONTIN) 100 MG capsule Take 300 mg by mouth 3 times daily        GAS RELIEF 125 MG CAPS TAKE 1 CAPSULE BY MOUTH FOUR TIMES DAILY AS NEEDED WITH MEALS 30 capsule 11     hydrochlorothiazide (HYDRODIURIL) 25 MG tablet Take 1 tablet (25 mg) by mouth daily       HYDROcodone-acetaminophen (NORCO) 5-325 MG tablet TAKE 1 TABLET BY MOUTH TWICE DAILY (MAX APAP 4GM/24HRS);TAKE 1 TABLET BY MOUTH EVERY 4 HOURS AS NEEDED (MAX APAP 4GM/24HRS) 100 tablet 0     levothyroxine (SYNTHROID/LEVOTHROID) 137 MCG tablet TAKE 1 TABLET BY MOUTH EVERY DAY DX HYPOTHYROIDISM 30 tablet 11     linaclotide (LINZESS) 290 MCG capsule Take 290 mcg by mouth every morning (before breakfast)       losartan (COZAAR) 50 MG tablet TAKE TABLET BY MOUTH EVERY MORNING 30 tablet 11     magnesium citrate 1.745 GM/30ML solution Take 296 mLs by mouth daily as needed for other       mineral oil enema Place 1 enema rectally as needed for constipation 1 enema 0     naloxegol 25 MG TABS tablet Take 25 mg by mouth every morning (before breakfast)       pantoprazole (PROTONIX) 20 MG EC tablet Take 20 mg by mouth daily       Polyethylene Glycol 3350 (MIRALAX PO) Take 1 capful by mouth daily       polyethylene glycol-propylene glycol (SYSTANE ULTRA) 0.4-0.3 % SOLN ophthalmic solution Place 2 drops into both eyes 4 times daily as needed for dry eyes       ranitidine (ZANTAC) 150 MG tablet TAKE 1 TABLET BY MOUTH DAILY AT BEDTIME DX GASTROESOPHAGEAL REFLUX DISEASE (Patient not taking: Reported on 6/15/2020) 30 tablet 11     sertraline (ZOLOFT) 25 MG tablet Take 3 tablets (75 mg) by mouth At Bedtime 60 tablet 0     triamcinolone (KENALOG) 0.1 % external cream Apply topically 2 times daily as needed for irritation (Patient not taking: Reported on 6/15/2020)       vitamin D3  (CHOLECALCIFEROL) 2000 units (50 mcg) tablet Take 2,000 Units by mouth daily       White Petrolatum (VASELINE) ointment Apply topically 3 times daily Apply to vulva 2-3 times a day 0700, 1200, 1830       zinc oxide (DESITIN) 20 % external ointment Apply topically as needed for dry skin or irritation       REVIEW OF SYSTEMS:  4 point ROS including Respiratory, CV, GI and , other than that noted in the HPI,  is negative    Objective:  BP (!) 156/85   Pulse 83   Temp 97.2  F (36.2  C)   Resp 20   Wt 68.9 kg (152 lb)   SpO2 95%   BMI 24.53 kg/m    Exam:  GENERAL APPEARANCE:  Alert, in no acute distress   HEAD:  Normal, normocephalic, atraumatic  ENT:  Mouth and posterior oropharynx normal, moist mucous membranes, hearing acuity - within normal limits   EYE EXAM:  EOM, conjunctivae, lids, pupils and irises normal   CHEST/RESP:  respiratory effort normal, no respiratory distress, lung sounds CTA    CV:  Rate and rhythm reg, no murmur/rub/gallop, 1-2+ peripheral edema not pitting  M/S:   extremities normal, gait normal-walking short distances with rolling walker - no longer wearing AFO, digits and nails within normal limits   SKIN:  L foot with flaking, peeling skin that is reddened and itchy   NEUROLOGIC EXAM: Normal gross motor movement, tone and coordination. No tremor. Cranial nerves 2-12 are normal tested and grossly at patient's baseline  PSYCH:  Alert and oriented to person-place-time , affect pleasant    And grossly at patient's baseline    Labs:   Recent labs in [facility] reviewed by me today.     ASSESSMENT/PLAN:  Adjustment disorder with mixed anxiety and depressed mood  VAMSHI (generalized anxiety disorder)  Episode of recurrent major depressive disorder, unspecified depression episode severity (H)  Patient with ongoing anxiety about her multiple medical problems, pain in her stomach and across her back, shoulders.  She is on sertraline at 75 mg daily, recently increased while in TCU.  She reports occasional  bad dreams, but is unclear in describing the incidence of them-reports them as 'years ago', cannot remember if she has had them since her return to assisted living facility or not.  She continues to dwell on her problems, becomes easily disorganized in her thinking and perseverates on previous health.     Essential hypertension  On hydrochlorothiazide, losartan, BP trending extremely variable - both high and very low on current regimen.   Thought likely due to MSA. Stable.      Chronic bilateral low back pain with bilateral sciatica  Primary osteoarthritis involving multiple joints  Ongoing pain, not well controlled.  Using extra strength tylenol, Norco, gabapentin,diclofenac gel.  She reports pain is very achy/worsening in shoulders.    -trial of Meloxicam 7.5 daily     Athlete's foot on left  Flaking, itching, reddened skin of L foot consistent with likely fungal infection  -Clotrimazole 1% BID x 4 weeks to skin of L foot    Chronic idiopathic constipation  Flatulence, eructation, and gas pain  Stable, ongoing problems without new symptoms.  On miralax, dulc supp prn, mag citrate prn, dulc tabs daily and prn.    Multiple system atrophy P (H)  No new symptoms.  Seen virtually by Dr. Monterroso on 6/9/20 who noted no significant disease progression.     Bilateral lower extremity edema  Visit today made while OT evaluating LE.  Continues with some edema not improved with tenso shapes and does not like to wear firmer compression wraps. OT will trial compreflex garments for better edema control       Orders written by provider at facility and transcribed by : Te Sargent  1. Clotimazole 1% cream -apply BID x4 weeks to L foot plantar surfaces for athlete's foot  2. Meloxicam 7.5 every day PO Dx: arthritis pain  3. Ok to process orders within 48 hours       Electronically signed by:  HAILEE Contreras CNP

## 2020-06-17 RX ORDER — MELOXICAM 7.5 MG/1
7.5 TABLET ORAL DAILY
COMMUNITY
End: 2020-07-15

## 2020-06-17 RX ORDER — CLOTRIMAZOLE 1 %
CREAM (GRAM) TOPICAL 2 TIMES DAILY PRN
COMMUNITY
End: 2021-02-27

## 2020-06-17 RX ORDER — BISACODYL 5 MG/1
5 TABLET, DELAYED RELEASE ORAL DAILY
COMMUNITY
End: 2020-11-02

## 2020-06-17 RX ORDER — BISACODYL 10 MG
10 SUPPOSITORY, RECTAL RECTAL DAILY PRN
COMMUNITY
End: 2021-02-27

## 2020-06-17 RX ORDER — SERTRALINE HYDROCHLORIDE 25 MG/1
25 TABLET, FILM COATED ORAL DAILY
COMMUNITY
End: 2020-07-15

## 2020-06-19 ENCOUNTER — TELEPHONE (OUTPATIENT)
Dept: NEUROLOGY | Facility: CLINIC | Age: 80
End: 2020-06-19

## 2020-06-19 NOTE — TELEPHONE ENCOUNTER
Spoke to patient, she gave verbal consent for daughters Eladio and Irasema to have proxy access to her [employer] account.    Added Eladio Dowd as proxy.

## 2020-06-22 ENCOUNTER — HOSPITAL ENCOUNTER (OUTPATIENT)
Dept: GENERAL RADIOLOGY | Facility: CLINIC | Age: 80
Discharge: HOME | End: 2020-06-22
Attending: INTERNAL MEDICINE | Admitting: INTERNAL MEDICINE
Payer: COMMERCIAL

## 2020-06-22 DIAGNOSIS — K59.00 CONSTIPATION, UNSPECIFIED CONSTIPATION TYPE: ICD-10-CM

## 2020-06-22 PROCEDURE — 74019 RADEX ABDOMEN 2 VIEWS: CPT

## 2020-06-30 ENCOUNTER — DOCUMENTATION ONLY (OUTPATIENT)
Dept: NEUROLOGY | Facility: CLINIC | Age: 80
End: 2020-06-30

## 2020-06-30 ENCOUNTER — MEDICAL CORRESPONDENCE (OUTPATIENT)
Dept: HEALTH INFORMATION MANAGEMENT | Facility: CLINIC | Age: 80
End: 2020-06-30

## 2020-07-13 ENCOUNTER — MEDICAL CORRESPONDENCE (OUTPATIENT)
Dept: HEALTH INFORMATION MANAGEMENT | Facility: CLINIC | Age: 80
End: 2020-07-13

## 2020-07-13 ENCOUNTER — DOCUMENTATION ONLY (OUTPATIENT)
Dept: NEUROLOGY | Facility: CLINIC | Age: 80
End: 2020-07-13

## 2020-07-13 NOTE — PROGRESS NOTES
[facility] [phone]  Records Date of service: 7/10/20  Copy has been sent to scanning and encounter routed to specialty nurse for review.

## 2020-07-14 ENCOUNTER — ASSISTED LIVING VISIT (OUTPATIENT)
Dept: GERIATRICS | Facility: CLINIC | Age: 80
End: 2020-07-14
Payer: COMMERCIAL

## 2020-07-14 VITALS — TEMPERATURE: 98.7 F | OXYGEN SATURATION: 96 %

## 2020-07-14 DIAGNOSIS — G23.2 MULTIPLE SYSTEM ATROPHY P (H): Primary | ICD-10-CM

## 2020-07-14 DIAGNOSIS — I71.40 ANEURYSM OF ABDOMINAL VESSEL (H): ICD-10-CM

## 2020-07-14 DIAGNOSIS — Z95.828 S/P AAA REPAIR USING BIFURCATION GRAFT: ICD-10-CM

## 2020-07-14 DIAGNOSIS — R10.84 ABDOMINAL PAIN, GENERALIZED: ICD-10-CM

## 2020-07-14 DIAGNOSIS — K21.9 GASTROESOPHAGEAL REFLUX DISEASE, ESOPHAGITIS PRESENCE NOT SPECIFIED: ICD-10-CM

## 2020-07-14 DIAGNOSIS — I06.0 RHEUMATIC AORTIC STENOSIS: ICD-10-CM

## 2020-07-14 DIAGNOSIS — F33.9 EPISODE OF RECURRENT MAJOR DEPRESSIVE DISORDER, UNSPECIFIED DEPRESSION EPISODE SEVERITY (H): ICD-10-CM

## 2020-07-14 DIAGNOSIS — F41.1 GAD (GENERALIZED ANXIETY DISORDER): ICD-10-CM

## 2020-07-14 DIAGNOSIS — R14.2 FLATULENCE, ERUCTATION, AND GAS PAIN: ICD-10-CM

## 2020-07-14 DIAGNOSIS — M15.0 PRIMARY OSTEOARTHRITIS INVOLVING MULTIPLE JOINTS: ICD-10-CM

## 2020-07-14 DIAGNOSIS — R14.3 FLATULENCE, ERUCTATION, AND GAS PAIN: ICD-10-CM

## 2020-07-14 DIAGNOSIS — Z86.79 S/P AAA REPAIR USING BIFURCATION GRAFT: ICD-10-CM

## 2020-07-14 DIAGNOSIS — I10 ESSENTIAL HYPERTENSION: ICD-10-CM

## 2020-07-14 DIAGNOSIS — R14.1 FLATULENCE, ERUCTATION, AND GAS PAIN: ICD-10-CM

## 2020-07-14 DIAGNOSIS — E03.9 HYPOTHYROIDISM, UNSPECIFIED TYPE: ICD-10-CM

## 2020-07-14 DIAGNOSIS — I95.1 ORTHOSTATIC HYPOTENSION DYSAUTONOMIC SYNDROME: ICD-10-CM

## 2020-07-14 DIAGNOSIS — K59.04 CHRONIC IDIOPATHIC CONSTIPATION: ICD-10-CM

## 2020-07-14 DIAGNOSIS — F43.23 ADJUSTMENT DISORDER WITH MIXED ANXIETY AND DEPRESSED MOOD: ICD-10-CM

## 2020-07-14 RX ORDER — DULOXETIN HYDROCHLORIDE 20 MG/1
60 CAPSULE, DELAYED RELEASE ORAL DAILY
COMMUNITY
End: 2022-02-27

## 2020-07-14 NOTE — PROGRESS NOTES
PRE-OP EVALUATION:    [facility] Record Number:  [id number]  Place of Service where encounter took place:  [facility]  (S) [[id number]]  Today's date: 2020    [facility]  [address]  [address]  [facility] [id number]  [phone]  Dept: [phone]    PRE-OP EVALUATION:  Today's date: 2020    Mehreen Camara (: 1940) presents for pre-operative evaluation assessment as requested by [facility].  She requires evaluation and anesthesia risk assessment prior to undergoing surgery/procedure for treatment of stomach pain .    Proposed Surgery/ Procedure: ultrasound guided endoscopy  Date of Surgery/ Procedure: 20  Time of Surgery/ Procedure: 1:00 pm, arrive at 11:30 am  Hospital/Surgical Facility:   [facility]   [address] [place] [address]  by  Dr. Radha Estrella MD  [facility]    Fax number for surgical facility: [phone]  Primary Physician: Cathy Sargent  Type of Anesthesia Anticipated: to be determined    Patient has a Health Care Directive or Living Will:  YES DNR/DNI     1. NO - Do you have a history of heart attack, stroke, stent, bypass or surgery on an artery in the head, neck, heart or legs?  2. NO - Do you ever have any pain or discomfort in your chest?  3. NO - Do you have a history of  Heart Failure?  4. NO - Are you troubled by shortness of breath when: walking on the level, up a slight hill or at night?  5. NO - Do you currently have a cold, bronchitis or other respiratory infection?  6. NO - Do you have a cough, shortness of breath or wheezing?  7. NO - Do you sometimes get pains in the calves of your legs when you walk?  8. NO - Do you or anyone in your family have previous history of blood clots?  9. NO - Do you or does anyone in your family have a serious bleeding problem such as prolonged bleeding following surgeries or cuts?  10. NO - Have you ever had problems with anemia or been told to take iron pills?  11.  NO - Have you had any abnormal blood loss such as black, tarry or bloody stools, or abnormal vaginal bleeding?  12. NO - Have you ever had a blood transfusion?  13. NO - Have you or any of your relatives ever had problems with anesthesia?  14. NO - Do you have sleep apnea, excessive snoring or daytime drowsiness?  15. NO - Do you have any prosthetic heart valves?  16. NO - Do you have prosthetic joints?  17. NO - Is there any chance that you may be pregnant?      HPI:     HPI related to upcoming procedure: patient with chronic stomach pain, belching, flatulence, chronic constipation    See problem list for active medical problems.  Problems all longstanding and stable, except as noted/documented.  See ROS for pertinent symptoms related to these conditions.      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     Priority: Medium     Movement disorder 09/19/2018     Priority: Medium     Rheumatic aortic stenosis 09/19/2018     Priority: Medium     Disorder of bursae and tendons in shoulder region 09/18/2018     Priority: Medium     Overview:   Created by Conversion    Replacement Utility updated for latest IMO load       Adjustment disorder with anxious mood 09/17/2018     Priority: Medium     Adjustment disorder with mixed anxiety and depressed mood 09/17/2018     Priority: Medium     Aneurysm of abdominal vessel (H) 09/17/2018     Priority: Medium     Overview:   Created by Conversion  [facility] Annotation: Jun 8 2011  8:07AM - Danni Ortiz: 4.4 on 11/2013.    Needs 6-12 month u/s or CT.    Replacement Utility  updated for latest IMO load       Harris's esophagus 09/17/2018     Priority: Medium     Overview:   Created by Conversion  [facility] Annotation: Feb  3 2012  8:32AM - Cameron Obando: Needs EGD 2/2017       Bradycardia 09/17/2018     Priority: Medium     Cataract 09/17/2018     Priority: Medium     Overview:   Created by Conversion       Major depressive disorder, recurrent episode (H) 09/17/2018     Priority: Medium     Overview:   Created by Conversion    Replacement Utility updated for latest IMO load       Constipation 09/17/2018     Priority: Medium     Dizziness 09/17/2018     Priority: Medium     Overview:   Created by Conversion       Dyslipidemia 09/17/2018     Priority: Medium     Overview:   Created by Conversion       Esophageal reflux 09/17/2018     Priority: Medium     Overview:   Created by Conversion       Hypertension 09/17/2018     Priority: Medium     Overview:   Created by Conversion    Replacement Utility updated for latest IMO load       Hypothyroidism 09/17/2018     Priority: Medium     Overview:   Created by Conversion    Replacement Utility updated for latest IMO load       Insomnia due to anxiety and fear 09/17/2018     Priority: Medium     Lower back pain 09/17/2018     Priority: Medium     Osteoarthrosis 09/17/2018     Priority: Medium     Overview:   Created by Conversion    Replacement Utility updated for latest IMO load       Disorder of bone and cartilage 09/17/2018     Priority: Medium     Overview:   Created by Conversion  [facility] Annotation: Dec 13 2012  7:41AM Roberta Goodwin: vit D/Ca, f/u   Dexa needed 1/2014.    Replacement Utility updated for latest IMO load       Lower extremity weakness 07/04/2018     Priority: Medium     Orthostatic hypotension dysautonomic syndrome (H) 09/22/2017     Priority: Medium     Primary insomnia 07/04/2017     Priority: Medium     Urinary incontinence in female 05/07/2017     Priority: Medium     Weakness 05/04/2017     Priority: Medium      S/P AAA repair using bifurcation graft 11/30/2015     Priority: Medium     Adrenal adenoma 11/20/2015     Priority: Medium     Overview:   Current hormonal evaluation through endocrinology        Past Medical History:   Diagnosis Date     Adrenal adenoma     stable, thought not to be hormonally active     Arthritis      Depressive disorder      Hypertension      Rheumatic fever     thought to have had as a child     Small bowel obstruction (H)      Thyroid disease      Past Surgical History:   Procedure Laterality Date     AAA REPAIR  2015    [facility] bifurcation graft     CARPAL TUNNEL RELEASE RT/LT Bilateral 2013     HYSTERECTOMY  2013     JOINT REPLACEMENT Right 2003     LAPAROSCOPIC CHOLECYSTECTOMY N/A 12/12/2018    Procedure: LAPAROSCOPIC CHOLECYSTECTOMY;  Surgeon: Amadeo Sebastian MD;  Location: [facility] OR     ORTHOPEDIC SURGERY       SPINE SURGERY  1981    cervical spine fusion     THYROIDECTOMY  2011     Current Outpatient Medications   Medication Sig Dispense Refill     acetaminophen (TYLENOL) 500 MG tablet Take 500 mg by mouth 3 times daily        bisacodyl (DULCOLAX) 10 MG suppository Place 10 mg rectally daily as needed for constipation       bisacodyl (DULCOLAX) 5 MG EC tablet Take 5 mg by mouth daily (and a second time if needed)       calcium carbonate (TUMS) 500 MG chewable tablet Take 1 tablet (500 mg) by mouth every 2 hours as needed for heartburn 150 tablet 1     carbidopa-levodopa (SINEMET)  MG tablet Take 2 tablets by mouth 3 times daily (0630, 1230, 1830)       clindamycin (CLEOCIN) 300 MG capsule Take 600 mg by mouth as needed Give 1 hour prior to dental appoitments       clotrimazole (LOTRIMIN) 1 % external cream Apply topically 2 times daily as needed       diclofenac (VOLTAREN) 1 % topical gel Apply 4 g topically 4 times daily as needed for moderate pain (to back and neck) 100 g 11     DULoxetine (CYMBALTA) 20 MG capsule Take 20 mg by mouth daily       gabapentin (NEURONTIN) 100 MG capsule  Take 300 mg by mouth 3 times daily        GAS RELIEF 125 MG CAPS TAKE 1 CAPSULE BY MOUTH FOUR TIMES DAILY AS NEEDED WITH MEALS 30 capsule 11     hydrochlorothiazide (HYDRODIURIL) 25 MG tablet Take 1 tablet (25 mg) by mouth daily       HYDROcodone-acetaminophen (NORCO) 5-325 MG tablet TAKE 1 TABLET BY MOUTH TWICE DAILY FOR PAIN. (MAX APAP 4GM/24HRS);TAKE 1 TABLET BY MOUTH EVERY 8 HOURS AS NEEDED FOR PAIN 9-10/10 (MAX APAP 4GM/24HRS) 120 tablet 0     levothyroxine (SYNTHROID/LEVOTHROID) 100 MCG tablet Take 1 tablet (100 mcg) by mouth daily       losartan (COZAAR) 50 MG tablet TAKE TABLET BY MOUTH EVERY MORNING 30 tablet 11     magnesium citrate solution Take 296 mLs by mouth as needed for other       pantoprazole (PROTONIX) 20 MG EC tablet Take 20 mg by mouth daily       Polyethylene Glycol 3350 (MIRALAX PO) Take 1 capful by mouth daily       polyethylene glycol-propylene glycol (SYSTANE ULTRA) 0.4-0.3 % SOLN ophthalmic solution Place 2 drops into both eyes 3 times daily (and additionally as needed/requested)       vitamin D3 (CHOLECALCIFEROL) 2000 units (50 mcg) tablet Take 2,000 Units by mouth daily       zinc oxide (DESITIN) 20 % external ointment Apply topically daily        OTC products: None, except as noted above    Allergies   Allergen Reactions     Penicillins Anaphylaxis, Rash and Shortness Of Breath     Aspirin Nausea and GI Disturbance     Atenolol Cough     Atorvastatin Muscle Pain (Myalgia) and Nausea and Vomiting     Bupropion Nausea     Clindamycin Other (See Comments)     Constipation      Codeine Nausea     Ibuprofen Nausea     Stomach upset     Lovastatin Muscle Pain (Myalgia)     Cephalexin Rash     Neck reddness      Latex Allergy: NO    Social History     Tobacco Use     Smoking status: Former Smoker     Packs/day: 0.50     Types: Cigarettes     Last attempt to quit: 1994     Years since quittin.6     Smokeless tobacco: Never Used   Substance Use Topics     Alcohol use: No     History    Drug Use No       REVIEW OF SYSTEMS:   4 point ROS including Respiratory, CV, GI and , other than that noted in the HPI,  is negative    EXAM:   Temp 98.7  F (37.1  C)   SpO2 96%   GENERAL APPEARANCE:  Alert, in no acute distress   HEAD:  Normal, normocephalic, atraumatic  EYE EXAM: normal external eye, conjunctiva, lids, JONO  NECK EXAM: supple, no JVD  CHEST/RESP:  respiratory effort normal, lung sounds CTA  , no respiratory distress  CV:  Rate reg, rhythm reg, systolic 2/6 murmur, 1+ peripheral edema bilaterally  GI/ABDOMEN:  normal bowel sounds, soft, tender to light touch, no palpable masses  M/S:   extremities normal, gait normal-with rolling walker for longer distances, normal muscle tone, and range of motion decreased bilateral shoulders  SKIN EXAM: CDI   NEUROLOGIC EXAM: Normal gross motor movement, tone and coordination. No tremor. Cranial nerves 2-12 are normal tested and grossly at patient's baseline  PSYCH:  Alert and oriented to person-place-time , affect anxious/pleasant, judgement appropriate      DIAGNOSTICS:   LABS pending: drawn 7/20/20  TSH: 0.28    BMP:    Na 141, K+ 3.6, BUN 15, Creat 0.60, eGFR >60  Ca 9.0     CBC:   Hgb 12.8, Hct 41.1, WBC 4.4, Platelets 200     COVID SWAB 7/22/20  Results:  Pending, see attached    Recent Labs   Lab Test 06/01/20 01/27/20  1722 12/28/19  1408   HGB  --  13.7 13.2   PLT  --  234 207    135 138   POTASSIUM 3.5 3.6 3.6   CR 0.63 0.45* 0.39*        IMPRESSION:   Reason for surgery/procedure: stomach pain, gas, belching, chronic constipation   Diagnosis/reason for consult: ultrasound endoscopy    The proposed surgical procedure is considered LOW risk.    REVISED CARDIAC RISK INDEX  The patient has the following serious cardiovascular risks for perioperative complications such as (MI, PE, VFib and 3  AV Block):  No serious cardiac risks  INTERPRETATION: 0 risks: Class I (very low risk - 0.4% complication rate)    The patient has the following additional  risks for perioperative complications:  No identified additional risks      ICD-10-CM    1. 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 Orthostatic hypotension dysautonomic syndrome (H)  G90.3    3. Rheumatic aortic stenosis  I06.0    4. Aneurysm of abdominal vessel (H)  I71.4    5. S/P AAA repair using bifurcation graft  Z95.828     Z86.79    6. Essential hypertension  I10    7. Adjustment disorder with mixed anxiety and depressed mood  F43.23    8. VAMSHI (generalized anxiety disorder)  F41.1    9. Primary osteoarthritis involving multiple joints  M89.49    10. Episode of recurrent major depressive disorder, unspecified depression episode severity (H)  F33.9    11. Chronic idiopathic constipation  K59.04    12. Gastroesophageal reflux disease, esophagitis presence not specified  K21.9    13. Abdominal pain, generalized  R10.84    14. Flatulence, eructation, and gas pain  R14.3     R14.1     R14.2    15. Hypothyroidism, unspecified type  E03.9 levothyroxine (SYNTHROID/LEVOTHROID) 100 MCG tablet       ORDERS:     --Patient is to take all scheduled medications on the day of surgery     APPROVAL GIVEN to proceed with proposed procedure, without further diagnostic evaluation    Orders written by provider at facility and transcribed by : Te Sargent  1. NP Swab for Covid Screening on 7/22/20  2. Follow Preop orders per [facility] recommendations  3.DC Sertraline  4. Start Duloxetine 20 mg PO daily Dx: Depression, Anxiety. Pain  5. Lab draw 7/20 to include:  TSH Dx: Hypothyroid  BMP Dx: HTN  CBC Dx: Pre-op Dx:Anemia  6.  Ordered 7/21/2020 :  Decrease levothyroxine to 100 mcg daily and recheck TSH in 6 weeks      Signed Electronically by: HAILEE Contreras CNP    Copy of this evaluation report is provided to requesting physician.    [facility] Preop Guidelines    Revised Cardiac Risk Index

## 2020-07-14 NOTE — LETTER
2020        RE: Mehreen OBRIEN Jax  [facility]  [address]  [facility] [address]        PRE-OP EVALUATION:    [place] [facility] Record Number:  [id number]  Place of Service where encounter took place:  [facility]  ([facility]) [[id number]]  Today's date: 2020    [facility]  [address]  [facility] [address]  [facility] [address] [phone]  Dept: [phone]    PRE-OP EVALUATION:  Today's date: 2020    Mehreen Orellanajennishadia (: 1940) presents for pre-operative evaluation assessment as requested by [facility].  She requires evaluation and anesthesia risk assessment prior to undergoing surgery/procedure for treatment of stomach pain .    Proposed Surgery/ Procedure: ultrasound guided endoscopy  Date of Surgery/ Procedure: 20  Time of Surgery/ Procedure: 1:00 pm, arrive at 11:30 am  Hospital/Surgical Facility:   [address] [address]  by  Dr. Radha Estrella MD  [facility]    Fax number for surgical facility: [phone]  Primary Physician: Cathy Sargent  Type of Anesthesia Anticipated: to be determined    Patient has a Health Care Directive or Living Will:  YES DNR/DNI     1. NO - Do you have a history of heart attack, stroke, stent, bypass or surgery on an artery in the head, neck, heart or legs?  2. NO - Do you ever have any pain or discomfort in your chest?  3. NO - Do you have a history of  Heart Failure?  4. NO - Are you troubled by shortness of breath when: walking on the level, up a slight hill or at night?  5. NO - Do you currently have a cold, bronchitis or other respiratory infection?  6. NO - Do you have a cough, shortness of breath or wheezing?  7. NO - Do you sometimes get pains in the calves of your legs when you walk?  8. NO - Do you or anyone in your family have previous history of blood clots?  9. NO - Do you or does anyone in your family have a serious bleeding problem such as prolonged  bleeding following surgeries or cuts?  10. NO - Have you ever had problems with anemia or been told to take iron pills?  11. NO - Have you had any abnormal blood loss such as black, tarry or bloody stools, or abnormal vaginal bleeding?  12. NO - Have you ever had a blood transfusion?  13. NO - Have you or any of your relatives ever had problems with anesthesia?  14. NO - Do you have sleep apnea, excessive snoring or daytime drowsiness?  15. NO - Do you have any prosthetic heart valves?  16. NO - Do you have prosthetic joints?  17. NO - Is there any chance that you may be pregnant?      HPI:     HPI related to upcoming procedure: patient with chronic stomach pain, belching, flatulence, chronic constipation    See problem list for active medical problems.  Problems all longstanding and stable, except as noted/documented.  See ROS for pertinent symptoms related to these conditions.      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     Priority: Medium     Movement disorder 09/19/2018     Priority: Medium     Rheumatic aortic stenosis 09/19/2018     Priority: Medium     Disorder of bursae and tendons in shoulder region 09/18/2018     Priority: Medium     Overview:   Created by Conversion    Replacement Utility updated for latest IMO load       Adjustment disorder with anxious mood 09/17/2018     Priority: Medium     Adjustment disorder with mixed anxiety and depressed mood 09/17/2018     Priority: Medium     Aneurysm of abdominal vessel (H) 09/17/2018     Priority: Medium     Overview:   Created by [facility]  [facility]  Annotation: Jun 8 2011  8:07AM - OrtizDanni: 4.4 on 11/2013.    Needs 6-12 month u/s or CT.    Replacement Utility updated for latest IMO load       Harris's esophagus 09/17/2018     Priority: Medium     Overview:   Created by [facility] Annotation: Feb  3 2012  8:32AM - Obando Cameron: Needs EGD 2/2017       Bradycardia 09/17/2018     Priority: Medium     Cataract 09/17/2018     Priority: Medium     Overview:   Created by Conversion       Major depressive disorder, recurrent episode (H) 09/17/2018     Priority: Medium     Overview:   Created by Conversion    Replacement Utility updated for latest IMO load       Constipation 09/17/2018     Priority: Medium     Dizziness 09/17/2018     Priority: Medium     Overview:   Created by Conversion       Dyslipidemia 09/17/2018     Priority: Medium     Overview:   Created by Conversion       Esophageal reflux 09/17/2018     Priority: Medium     Overview:   Created by Conversion       Hypertension 09/17/2018     Priority: Medium     Overview:   Created by Conversion    Replacement Utility updated for latest IMO load       Hypothyroidism 09/17/2018     Priority: Medium     Overview:   Created by Conversion    Replacement Utility updated for latest IMO load       Insomnia due to anxiety and fear 09/17/2018     Priority: Medium     Lower back pain 09/17/2018     Priority: Medium     Osteoarthrosis 09/17/2018     Priority: Medium     Overview:   Created by Conversion    Replacement Utility updated for latest IMO load       Disorder of bone and cartilage 09/17/2018     Priority: Medium     Overview:   Created by [place]  [facility] Annotation: Dec 13 2012  7:41AM Roberta Goodwin: vit D/Ca, f/u   Dexa needed 1/2014.    Replacement Utility updated for latest IMO load       Lower extremity weakness 07/04/2018     Priority: Medium     Orthostatic hypotension dysautonomic syndrome (H) 09/22/2017     Priority: Medium     Primary insomnia 07/04/2017     Priority:  Medium     Urinary incontinence in female 05/07/2017     Priority: Medium     Weakness 05/04/2017     Priority: Medium     S/P AAA repair using bifurcation graft 11/30/2015     Priority: Medium     Adrenal adenoma 11/20/2015     Priority: Medium     Overview:   Current hormonal evaluation through endocrinology        Past Medical History:   Diagnosis Date     Adrenal adenoma     stable, thought not to be hormonally active     Arthritis      Depressive disorder      Hypertension      Rheumatic fever     thought to have had as a child     Small bowel obstruction (H)      Thyroid disease      Past Surgical History:   Procedure Laterality Date     AAA REPAIR  2015    [facility] bifurcation graft     CARPAL TUNNEL RELEASE RT/LT Bilateral 2013     HYSTERECTOMY  2013     JOINT REPLACEMENT Right 2003     LAPAROSCOPIC CHOLECYSTECTOMY N/A 12/12/2018    Procedure: LAPAROSCOPIC CHOLECYSTECTOMY;  Surgeon: Amadeo Sebastian MD;  Location: [facility] OR     ORTHOPEDIC SURGERY       SPINE SURGERY  1981    cervical spine fusion     THYROIDECTOMY  2011     Current Outpatient Medications   Medication Sig Dispense Refill     acetaminophen (TYLENOL) 500 MG tablet Take 500 mg by mouth 3 times daily        bisacodyl (DULCOLAX) 10 MG suppository Place 10 mg rectally daily as needed for constipation       bisacodyl (DULCOLAX) 5 MG EC tablet Take 5 mg by mouth daily (and a second time if needed)       calcium carbonate (TUMS) 500 MG chewable tablet Take 1 tablet (500 mg) by mouth every 2 hours as needed for heartburn 150 tablet 1     carbidopa-levodopa (SINEMET)  MG tablet Take 2 tablets by mouth 3 times daily (0630, 1230, 1830)       clindamycin (CLEOCIN) 300 MG capsule Take 600 mg by mouth as needed Give 1 hour prior to dental appoitments       clotrimazole (LOTRIMIN) 1 % external cream Apply topically 2 times daily as needed       diclofenac (VOLTAREN) 1 % topical gel Apply 4 g topically 4 times daily as needed for moderate pain (to back and neck)  100 g 11     DULoxetine (CYMBALTA) 20 MG capsule Take 20 mg by mouth daily       gabapentin (NEURONTIN) 100 MG capsule Take 300 mg by mouth 3 times daily        GAS RELIEF 125 MG CAPS TAKE 1 CAPSULE BY MOUTH FOUR TIMES DAILY AS NEEDED WITH MEALS 30 capsule 11     hydrochlorothiazide (HYDRODIURIL) 25 MG tablet Take 1 tablet (25 mg) by mouth daily       HYDROcodone-acetaminophen (NORCO) 5-325 MG tablet TAKE 1 TABLET BY MOUTH TWICE DAILY FOR PAIN. (MAX APAP 4GM/24HRS);TAKE 1 TABLET BY MOUTH EVERY 8 HOURS AS NEEDED FOR PAIN 9-10/10 (MAX APAP 4GM/24HRS) 120 tablet 0     levothyroxine (SYNTHROID/LEVOTHROID) 100 MCG tablet Take 1 tablet (100 mcg) by mouth daily       losartan (COZAAR) 50 MG tablet TAKE TABLET BY MOUTH EVERY MORNING 30 tablet 11     magnesium citrate solution Take 296 mLs by mouth as needed for other       pantoprazole (PROTONIX) 20 MG EC tablet Take 20 mg by mouth daily       Polyethylene Glycol 3350 (MIRALAX PO) Take 1 capful by mouth daily       polyethylene glycol-propylene glycol (SYSTANE ULTRA) 0.4-0.3 % SOLN ophthalmic solution Place 2 drops into both eyes 3 times daily (and additionally as needed/requested)       vitamin D3 (CHOLECALCIFEROL) 2000 units (50 mcg) tablet Take 2,000 Units by mouth daily       zinc oxide (DESITIN) 20 % external ointment Apply topically daily        OTC products: None, except as noted above    Allergies   Allergen Reactions     Penicillins Anaphylaxis, Rash and Shortness Of Breath     Aspirin Nausea and GI Disturbance     Atenolol Cough     Atorvastatin Muscle Pain (Myalgia) and Nausea and Vomiting     Bupropion Nausea     Clindamycin Other (See Comments)     Constipation      Codeine Nausea     Ibuprofen Nausea     Stomach upset     Lovastatin Muscle Pain (Myalgia)     Cephalexin Rash     Neck reddness      Latex Allergy: NO    Social History     Tobacco Use     Smoking status: Former Smoker     Packs/day: 0.50     Types: Cigarettes     Last attempt to quit: 12/11/1994      Years since quittin.6     Smokeless tobacco: Never Used   Substance Use Topics     Alcohol use: No     History   Drug Use No       REVIEW OF SYSTEMS:   4 point ROS including Respiratory, CV, GI and , other than that noted in the HPI,  is negative    EXAM:   Temp 98.7  F (37.1  C)   SpO2 96%   GENERAL APPEARANCE:  Alert, in no acute distress   HEAD:  Normal, normocephalic, atraumatic  EYE EXAM: normal external eye, conjunctiva, lids, JONO  NECK EXAM: supple, no JVD  CHEST/RESP:  respiratory effort normal, lung sounds CTA  , no respiratory distress  CV:  Rate reg, rhythm reg, systolic 2/6 murmur, 1+ peripheral edema bilaterally  GI/ABDOMEN:  normal bowel sounds, soft, tender to light touch, no palpable masses  M/S:   extremities normal, gait normal-with rolling walker for longer distances, normal muscle tone, and range of motion decreased bilateral shoulders  SKIN EXAM: CDI   NEUROLOGIC EXAM: Normal gross motor movement, tone and coordination. No tremor. Cranial nerves 2-12 are normal tested and grossly at patient's baseline  PSYCH:  Alert and oriented to person-place-time , affect anxious/pleasant, judgement appropriate      DIAGNOSTICS:   LABS pending: drawn 20  TSH: 0.28    BMP:    Na 141, K+ 3.6, BUN 15, Creat 0.60, eGFR >60  Ca 9.0     CBC:   Hgb 12.8, Hct 41.1, WBC 4.4, Platelets 200     COVID SWAB 20  Results:  Pending, see attached    Recent Labs   Lab Test 20  1722 19  1408   HGB  --  13.7 13.2   PLT  --  234 207    135 138   POTASSIUM 3.5 3.6 3.6   CR 0.63 0.45* 0.39*        IMPRESSION:   Reason for surgery/procedure: stomach pain, gas, belching, chronic constipation   Diagnosis/reason for consult: ultrasound endoscopy    The proposed surgical procedure is considered LOW risk.    REVISED CARDIAC RISK INDEX  The patient has the following serious cardiovascular risks for perioperative complications such as (MI, PE, VFib and 3  AV Block):  No serious cardiac  risks  INTERPRETATION: 0 risks: Class I (very low risk - 0.4% complication rate)    The patient has the following additional risks for perioperative complications:  No identified additional risks      ICD-10-CM    1. 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 Orthostatic hypotension dysautonomic syndrome (H)  G90.3    3. Rheumatic aortic stenosis  I06.0    4. Aneurysm of abdominal vessel (H)  I71.4    5. S/P AAA repair using bifurcation graft  Z95.828     Z86.79    6. Essential hypertension  I10    7. Adjustment disorder with mixed anxiety and depressed mood  F43.23    8. VAMSHI (generalized anxiety disorder)  F41.1    9. Primary osteoarthritis involving multiple joints  M89.49    10. Episode of recurrent major depressive disorder, unspecified depression episode severity (H)  F33.9    11. Chronic idiopathic constipation  K59.04    12. Gastroesophageal reflux disease, esophagitis presence not specified  K21.9    13. Abdominal pain, generalized  R10.84    14. Flatulence, eructation, and gas pain  R14.3     R14.1     R14.2    15. Hypothyroidism, unspecified type  E03.9 levothyroxine (SYNTHROID/LEVOTHROID) 100 MCG tablet       ORDERS:     --Patient is to take all scheduled medications on the day of surgery     APPROVAL GIVEN to proceed with proposed procedure, without further diagnostic evaluation    Orders written by provider at facility and transcribed by : Te Sargent  1. NP Swab for Covid Screening on 7/22/20  2. Follow Preop orders per [facility] recommendations  3.DC Sertraline  4. Start Duloxetine 20 mg PO daily Dx: Depression, Anxiety. Pain  5. Lab draw 7/20 to include:  TSH Dx: Hypothyroid  BMP Dx: HTN  CBC Dx: Pre-op Dx:Anemia  6.  Ordered 7/21/2020 :  Decrease levothyroxine to 100 mcg daily and recheck TSH in 6 weeks      Signed Electronically by: HAILEE Contreras CNP    Copy of this evaluation report is provided to requesting physician.    [facility] Preop Guidelines    Revised Cardiac Risk  Index              Sincerely,        HAILEE Contreras CNP

## 2020-07-18 ENCOUNTER — RECORDS - HEALTHEAST (OUTPATIENT)
Dept: LAB | Facility: CLINIC | Age: 80
End: 2020-07-18

## 2020-07-20 ENCOUNTER — TRANSFERRED RECORDS (OUTPATIENT)
Dept: HEALTH INFORMATION MANAGEMENT | Facility: CLINIC | Age: 80
End: 2020-07-20

## 2020-07-20 ENCOUNTER — ALLIED HEALTH/NURSE VISIT (OUTPATIENT)
Dept: PHARMACY | Facility: CLINIC | Age: 80
End: 2020-07-20
Payer: MEDICAID

## 2020-07-20 DIAGNOSIS — Z53.9 ERRONEOUS ENCOUNTER--DISREGARD: Primary | ICD-10-CM

## 2020-07-20 LAB
ANION GAP SERPL CALCULATED.3IONS-SCNC: 8 MMOL/L (ref 5–18)
BUN SERPL-MCNC: 15 MG/DL (ref 8–28)
CALCIUM SERPL-MCNC: 9 MG/DL (ref 8.5–10.5)
CHLORIDE BLD-SCNC: 103 MMOL/L (ref 98–107)
CO2 SERPL-SCNC: 30 MMOL/L (ref 22–31)
CREAT SERPL-MCNC: 0.6 MG/DL (ref 0.6–1.1)
ERYTHROCYTE [DISTWIDTH] IN BLOOD BY AUTOMATED COUNT: 12 % (ref 11–14.5)
GFR SERPL CREATININE-BSD FRML MDRD: >60 ML/MIN/1.73M2
GLUCOSE BLD-MCNC: 88 MG/DL (ref 70–125)
HCT VFR BLD AUTO: 41.1 % (ref 35–47)
HGB BLD-MCNC: 12.8 G/DL (ref 12–16)
MCH RBC QN AUTO: 31 PG (ref 27–34)
MCHC RBC AUTO-ENTMCNC: 31.1 G/DL (ref 32–36)
MCV RBC AUTO: 100 FL (ref 80–100)
PLATELET # BLD AUTO: 200 THOU/UL (ref 140–440)
PMV BLD AUTO: 10.9 FL (ref 8.5–12.5)
POTASSIUM BLD-SCNC: 3.6 MMOL/L (ref 3.5–5)
RBC # BLD AUTO: 4.13 MILL/UL (ref 3.8–5.4)
SODIUM SERPL-SCNC: 141 MMOL/L (ref 136–145)
TSH SERPL DL<=0.005 MIU/L-ACNC: 0.28 UIU/ML (ref 0.3–5)
WBC: 4.4 THOU/UL (ref 4–11)

## 2020-07-20 NOTE — PROGRESS NOTES
Encounter opened in error. This visit was a no-show. Voicemail left for daughter, Eladio, encouraging her to reschedule the MTM appointment.    Arianne Melendrez, Pharm.D.  Medication Therapy Management Pharmacist  Phone: [phone]

## 2020-07-21 RX ORDER — LEVOTHYROXINE SODIUM 100 UG/1
100 TABLET ORAL DAILY
Start: 2020-07-21

## 2020-07-30 ENCOUNTER — VIRTUAL VISIT (OUTPATIENT)
Dept: GERIATRICS | Facility: CLINIC | Age: 80
End: 2020-07-30
Payer: COMMERCIAL

## 2020-07-30 DIAGNOSIS — F41.1 GAD (GENERALIZED ANXIETY DISORDER): ICD-10-CM

## 2020-07-30 DIAGNOSIS — F33.9 EPISODE OF RECURRENT MAJOR DEPRESSIVE DISORDER, UNSPECIFIED DEPRESSION EPISODE SEVERITY (H): ICD-10-CM

## 2020-07-30 DIAGNOSIS — R46.89 COGNITIVE AND BEHAVIORAL CHANGES: ICD-10-CM

## 2020-07-30 DIAGNOSIS — R41.89 COGNITIVE AND BEHAVIORAL CHANGES: ICD-10-CM

## 2020-07-30 DIAGNOSIS — G23.2 MULTIPLE SYSTEM ATROPHY P (H): Primary | ICD-10-CM

## 2020-07-30 DIAGNOSIS — R44.3 HALLUCINATIONS: ICD-10-CM

## 2020-07-30 PROCEDURE — 99309 SBSQ NF CARE MODERATE MDM 30: CPT | Mod: TEL | Performed by: NURSE PRACTITIONER

## 2020-07-30 NOTE — PROGRESS NOTES
"Telephone visit  [facility]  Mehreen Camara is a 80 year old year old adult who is being evaluated via a billable telephone visit due to the restrictions of the Covid-19 pandemic. The patient has been notified of following: \"This telephone visit will be conducted via a call between you and your provider. We have found that certain health care needs can be provided without the need for a physical exam. This service lets us provide the care you need with a short phone conversation. If a prescription is necessary we will work with the facility you are at and can send it directly to your pharmacy. If lab work is needed we can place an order to have it drawn at the facility you are at. If during the course of the call the provider feels a telephone visit is not appropriate, you will not be charged for this service.\"     Patient or Patient's first contact has given verbal consent for Telephone visit?  Yes.    Place of Location at the time of visit: [facility]   _____________________________________________  Mehreen Camara complains of :   Chief Complaint   Patient presents with     Telephone     Episodic   I have reviewed and updated the patient's Past Medical History, Social History, Family History and Medication List.  ALLERGIES:   Allergies   Allergen Reactions     Penicillins Anaphylaxis, Rash and Shortness Of Breath     Aspirin Nausea and GI Disturbance     Atenolol Cough     Atorvastatin Muscle Pain (Myalgia) and Nausea and Vomiting     Bupropion Nausea     Clindamycin Other (See Comments)     Constipation      Codeine Nausea     Ibuprofen Nausea     Stomach upset     Lovastatin Muscle Pain (Myalgia)     Cephalexin Rash     Neck reddness   HPI: information obtained from: facility chart records, facility staff, [facility] Epic chart review, Care Everywhere Epic chart review and family/first contact (Irasema's) report.     Provider was contacted by patient's daughter regarding " concern for medication side effects and cognitive changes she has seen in [facility]. Daughter Irasema reports her mother's anxiety over any medication changes. On chart review we note recent restart of Cymbalta, and this is the medication that Irasema thinks may be adversely effecting her mom. Irasema reports patient c/o hallucinations and anxiety.    We provided most recent CPT scoring, and with underlying dementia. We suspect LBD underlying as well. We also note some inpatient psychiatry input for delirium. According to daughter, patient is not having other sxs of illness. Irasema concerned that she is not tolerating use of Cymbalta. Counseling given to daughter around this dosing, and Mehreen has utilized Cymbalta since 2018. She is followed by [facility], who recommended Cymbalta restarted recently secondary to known back pain and night terrors in addition to VAMSHI.    MEDICATIONS:  Current Outpatient Medications   Medication Sig Dispense Refill     acetaminophen (TYLENOL) 500 MG tablet Take 500 mg by mouth 3 times daily        bisacodyl (DULCOLAX) 10 MG suppository Place 10 mg rectally daily as needed for constipation       bisacodyl (DULCOLAX) 5 MG EC tablet Take 5 mg by mouth daily (and a second time if needed)       calcium carbonate (TUMS) 500 MG chewable tablet Take 1 tablet (500 mg) by mouth every 2 hours as needed for heartburn 150 tablet 1     carbidopa-levodopa (SINEMET)  MG tablet Take 2 tablets by mouth 3 times daily (0630, 1230, 1830)       clindamycin (CLEOCIN) 300 MG capsule Take 600 mg by mouth as needed Give 1 hour prior to dental appoitments       clotrimazole (LOTRIMIN) 1 % external cream Apply topically 2 times daily as needed       diclofenac (VOLTAREN) 1 % topical gel Apply 4 g topically 4 times daily as needed for moderate pain (to back and neck) 100 g 11     DULoxetine (CYMBALTA) 20 MG capsule Take 20 mg by mouth daily       gabapentin (NEURONTIN) 100 MG capsule Take 300 mg by mouth 3 times daily         GAS RELIEF 125 MG CAPS TAKE 1 CAPSULE BY MOUTH FOUR TIMES DAILY AS NEEDED WITH MEALS 30 capsule 11     hydrochlorothiazide (HYDRODIURIL) 25 MG tablet Take 1 tablet (25 mg) by mouth daily       HYDROcodone-acetaminophen (NORCO) 5-325 MG tablet TAKE 1 TABLET BY MOUTH TWICE DAILY FOR PAIN. (MAX APAP 4GM/24HRS);TAKE 1 TABLET BY MOUTH EVERY 8 HOURS AS NEEDED FOR PAIN 9-10/10 (MAX APAP 4GM/24HRS) 120 tablet 0     levothyroxine (SYNTHROID/LEVOTHROID) 100 MCG tablet Take 1 tablet (100 mcg) by mouth daily       losartan (COZAAR) 50 MG tablet TAKE TABLET BY MOUTH EVERY MORNING 30 tablet 11     magnesium citrate solution Take 296 mLs by mouth as needed for other       pantoprazole (PROTONIX) 20 MG EC tablet Take 20 mg by mouth daily       Polyethylene Glycol 3350 (MIRALAX PO) Take 1 capful by mouth daily       polyethylene glycol-propylene glycol (SYSTANE ULTRA) 0.4-0.3 % SOLN ophthalmic solution Place 2 drops into both eyes 3 times daily (and additionally as needed/requested)       vitamin D3 (CHOLECALCIFEROL) 2000 units (50 mcg) tablet Take 2,000 Units by mouth daily       zinc oxide (DESITIN) 20 % external ointment Apply topically daily        Labs:   Recent labs in [facility] reviewed by me today.     Assessment/Plan:  1. Multiple system atrophy P (H)  2. VAMSHI (generalized anxiety disorder)  3. Episode of recurrent major depressive disorder, unspecified depression episode severity (H)  4. Hallucinations  5. Cognitive and behavioral changes  Acute-on-chronic. Stable.     Placed call to nurse manager at [facility] to update and coordinate care.    Reassurance and counseling given to family.     May be appropriate to review Cymbalta use w/ neurology and PCP.    We note most recent CPT from June TCU stay was 4.2/5.6, which may indicate some dementia underlying. Further testing is needed.    Will follow-up w/ patient and/or PCP w/in 1 week to discuss further evaluation should sxs still be present.     Nursing to follow-up w/ provider  if sxs worsen/change.     Phone call duration:  26 minutes    Electronically signed by:  Dr. Lisa Parry, APRN CNP DNP

## 2020-08-05 ENCOUNTER — APPOINTMENT (OUTPATIENT)
Dept: GENERAL RADIOLOGY | Facility: CLINIC | Age: 80
End: 2020-08-05
Attending: EMERGENCY MEDICINE
Payer: COMMERCIAL

## 2020-08-05 ENCOUNTER — HOSPITAL ENCOUNTER (OUTPATIENT)
Facility: CLINIC | Age: 80
Setting detail: OBSERVATION
Discharge: NURSING, REHAB OR LONG-TERM CARE | End: 2020-08-06
Attending: EMERGENCY MEDICINE | Admitting: INTERNAL MEDICINE
Payer: COMMERCIAL

## 2020-08-05 DIAGNOSIS — Z03.818 ENCNTR FOR OBS FOR SUSP EXPSR TO OTH BIOLG AGENTS RULED OUT: ICD-10-CM

## 2020-08-05 DIAGNOSIS — M54.41 ACUTE BILATERAL LOW BACK PAIN WITH BILATERAL SCIATICA: ICD-10-CM
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
## 2020-08-05 DIAGNOSIS — M54.42 ACUTE BILATERAL LOW BACK PAIN WITH BILATERAL SCIATICA: ICD-10-CM

## 2020-08-05 DIAGNOSIS — M25.562 ACUTE PAIN OF BOTH KNEES: ICD-10-CM

## 2020-08-05 DIAGNOSIS — M25.561 ACUTE PAIN OF BOTH KNEES: ICD-10-CM

## 2020-08-05 DIAGNOSIS — R53.1 WEAKNESS: ICD-10-CM

## 2020-08-05 PROBLEM — I10 HYPERTENSION: Status: ACTIVE | Noted: 2018-09-17

## 2020-08-05 PROBLEM — M62.81 GENERALIZED MUSCLE WEAKNESS: Status: ACTIVE | Noted: 2020-08-05

## 2020-08-05 PROBLEM — K21.9 ESOPHAGEAL REFLUX: Status: ACTIVE | Noted: 2018-09-17

## 2020-08-05 PROBLEM — F32.A DEPRESSION: Status: ACTIVE | Noted: 2020-08-05

## 2020-08-05 PROBLEM — E03.9 HYPOTHYROIDISM: Status: ACTIVE | Noted: 2018-09-17

## 2020-08-05 PROBLEM — G25.9 MOVEMENT DISORDER: Status: ACTIVE | Noted: 2018-09-19

## 2020-08-05 PROBLEM — G47.52 REM SLEEP BEHAVIOR DISORDER: Status: ACTIVE | Noted: 2020-08-05

## 2020-08-05 LAB
ALBUMIN UR-MCNC: NEGATIVE MG/DL
ANION GAP SERPL CALCULATED.3IONS-SCNC: <1 MMOL/L (ref 3–14)
APPEARANCE UR: CLEAR
BASOPHILS # BLD AUTO: 0 10E9/L (ref 0–0.2)
BASOPHILS NFR BLD AUTO: 0.8 %
BILIRUB UR QL STRIP: NEGATIVE
BUN SERPL-MCNC: 15 MG/DL (ref 7–30)
CALCIUM SERPL-MCNC: 9 MG/DL (ref 8.5–10.1)
CHLORIDE SERPL-SCNC: 105 MMOL/L (ref 94–109)
CO2 SERPL-SCNC: 35 MMOL/L (ref 20–32)
COLOR UR AUTO: YELLOW
CREAT SERPL-MCNC: 0.33 MG/DL (ref 0.52–1.04)
DIFFERENTIAL METHOD BLD: NORMAL
EOSINOPHIL # BLD AUTO: 0.1 10E9/L (ref 0–0.7)
EOSINOPHIL NFR BLD AUTO: 1.2 %
ERYTHROCYTE [DISTWIDTH] IN BLOOD BY AUTOMATED COUNT: 11.4 % (ref 10–15)
GFR SERPL CREATININE-BSD FRML MDRD: >90 ML/MIN/{1.73_M2}
GLUCOSE SERPL-MCNC: 89 MG/DL (ref 70–99)
GLUCOSE UR STRIP-MCNC: NEGATIVE MG/DL
HCT VFR BLD AUTO: 42.9 % (ref 35–47)
HGB BLD-MCNC: 14.1 G/DL (ref 11.7–15.7)
HGB UR QL STRIP: NEGATIVE
IMM GRANULOCYTES # BLD: 0 10E9/L (ref 0–0.4)
IMM GRANULOCYTES NFR BLD: 0.2 %
KETONES UR STRIP-MCNC: NEGATIVE MG/DL
LEUKOCYTE ESTERASE UR QL STRIP: ABNORMAL
LYMPHOCYTES # BLD AUTO: 1.4 10E9/L (ref 0.8–5.3)
LYMPHOCYTES NFR BLD AUTO: 28.4 %
MCH RBC QN AUTO: 31.8 PG (ref 26.5–33)
MCHC RBC AUTO-ENTMCNC: 32.9 G/DL (ref 31.5–36.5)
MCV RBC AUTO: 97 FL (ref 78–100)
MONOCYTES # BLD AUTO: 0.4 10E9/L (ref 0–1.3)
MONOCYTES NFR BLD AUTO: 8.7 %
NEUTROPHILS # BLD AUTO: 3.1 10E9/L (ref 1.6–8.3)
NEUTROPHILS NFR BLD AUTO: 60.7 %
NITRATE UR QL: NEGATIVE
NRBC # BLD AUTO: 0 10*3/UL
NRBC BLD AUTO-RTO: 0 /100
PH UR STRIP: 8 PH (ref 5–7)
PLATELET # BLD AUTO: 193 10E9/L (ref 150–450)
POTASSIUM SERPL-SCNC: 4.4 MMOL/L (ref 3.4–5.3)
RBC # BLD AUTO: 4.43 10E12/L (ref 3.8–5.2)
RBC #/AREA URNS AUTO: 1 /HPF (ref 0–2)
SODIUM SERPL-SCNC: 140 MMOL/L (ref 133–144)
SOURCE: ABNORMAL
SP GR UR STRIP: 1.01 (ref 1–1.03)
SQUAMOUS #/AREA URNS AUTO: 1 /HPF (ref 0–1)
UROBILINOGEN UR STRIP-MCNC: 0 MG/DL (ref 0–2)
WBC # BLD AUTO: 5 10E9/L (ref 4–11)
WBC #/AREA URNS AUTO: 7 /HPF (ref 0–5)

## 2020-08-05 PROCEDURE — 80048 BASIC METABOLIC PNL TOTAL CA: CPT | Performed by: EMERGENCY MEDICINE

## 2020-08-05 PROCEDURE — G0378 HOSPITAL OBSERVATION PER HR: HCPCS

## 2020-08-05 PROCEDURE — 72170 X-RAY EXAM OF PELVIS: CPT

## 2020-08-05 PROCEDURE — C9803 HOPD COVID-19 SPEC COLLECT: HCPCS | Performed by: EMERGENCY MEDICINE

## 2020-08-05 PROCEDURE — 25000132 ZZH RX MED GY IP 250 OP 250 PS 637: Performed by: EMERGENCY MEDICINE

## 2020-08-05 PROCEDURE — 99285 EMERGENCY DEPT VISIT HI MDM: CPT | Mod: 25 | Performed by: EMERGENCY MEDICINE

## 2020-08-05 PROCEDURE — 85025 COMPLETE CBC W/AUTO DIFF WBC: CPT | Performed by: EMERGENCY MEDICINE

## 2020-08-05 PROCEDURE — 81001 URINALYSIS AUTO W/SCOPE: CPT | Performed by: PHYSICIAN ASSISTANT

## 2020-08-05 PROCEDURE — 99284 EMERGENCY DEPT VISIT MOD MDM: CPT | Mod: Z6 | Performed by: EMERGENCY MEDICINE

## 2020-08-05 PROCEDURE — U0003 INFECTIOUS AGENT DETECTION BY NUCLEIC ACID (DNA OR RNA); SEVERE ACUTE RESPIRATORY SYNDROME CORONAVIRUS 2 (SARS-COV-2) (CORONAVIRUS DISEASE [COVID-19]), AMPLIFIED PROBE TECHNIQUE, MAKING USE OF HIGH THROUGHPUT TECHNOLOGIES AS DESCRIBED BY CMS-2020-01-R: HCPCS | Performed by: EMERGENCY MEDICINE

## 2020-08-05 PROCEDURE — 73560 X-RAY EXAM OF KNEE 1 OR 2: CPT | Mod: 50

## 2020-08-05 PROCEDURE — 99220 ZZC INITIAL OBSERVATION CARE,LEVL III: CPT | Performed by: PHYSICIAN ASSISTANT

## 2020-08-05 RX ORDER — GABAPENTIN 300 MG/1
300 CAPSULE ORAL 3 TIMES DAILY
Status: DISCONTINUED | OUTPATIENT
Start: 2020-08-05 | End: 2020-08-06

## 2020-08-05 RX ORDER — CARBIDOPA AND LEVODOPA 25; 100 MG/1; MG/1
2 TABLET ORAL 3 TIMES DAILY
Status: DISCONTINUED | OUTPATIENT
Start: 2020-08-05 | End: 2020-08-06 | Stop reason: HOSPADM

## 2020-08-05 RX ORDER — LOSARTAN POTASSIUM 50 MG/1
50 TABLET ORAL DAILY
Status: DISCONTINUED | OUTPATIENT
Start: 2020-08-06 | End: 2020-08-06 | Stop reason: HOSPADM

## 2020-08-05 RX ORDER — ACETAMINOPHEN 325 MG/1
650 TABLET ORAL EVERY 4 HOURS PRN
Status: DISCONTINUED | OUTPATIENT
Start: 2020-08-05 | End: 2020-08-06 | Stop reason: HOSPADM

## 2020-08-05 RX ORDER — ONDANSETRON 4 MG/1
4 TABLET, ORALLY DISINTEGRATING ORAL EVERY 6 HOURS PRN
Status: DISCONTINUED | OUTPATIENT
Start: 2020-08-05 | End: 2020-08-06 | Stop reason: HOSPADM

## 2020-08-05 RX ORDER — OXYCODONE AND ACETAMINOPHEN 5; 325 MG/1; MG/1
1-2 TABLET ORAL EVERY 4 HOURS PRN
Status: DISCONTINUED | OUTPATIENT
Start: 2020-08-05 | End: 2020-08-05

## 2020-08-05 RX ORDER — PROCHLORPERAZINE 25 MG
12.5 SUPPOSITORY, RECTAL RECTAL EVERY 12 HOURS PRN
Status: DISCONTINUED | OUTPATIENT
Start: 2020-08-05 | End: 2020-08-06 | Stop reason: HOSPADM

## 2020-08-05 RX ORDER — ACETAMINOPHEN 650 MG/1
650 SUPPOSITORY RECTAL EVERY 4 HOURS PRN
Status: DISCONTINUED | OUTPATIENT
Start: 2020-08-05 | End: 2020-08-06 | Stop reason: HOSPADM

## 2020-08-05 RX ORDER — HYDROCHLOROTHIAZIDE 25 MG/1
25 TABLET ORAL DAILY
Status: DISCONTINUED | OUTPATIENT
Start: 2020-08-06 | End: 2020-08-06 | Stop reason: HOSPADM

## 2020-08-05 RX ORDER — AMOXICILLIN 250 MG
1 CAPSULE ORAL 2 TIMES DAILY PRN
Status: DISCONTINUED | OUTPATIENT
Start: 2020-08-05 | End: 2020-08-06 | Stop reason: HOSPADM

## 2020-08-05 RX ORDER — ONDANSETRON 2 MG/ML
4 INJECTION INTRAMUSCULAR; INTRAVENOUS EVERY 6 HOURS PRN
Status: DISCONTINUED | OUTPATIENT
Start: 2020-08-05 | End: 2020-08-06 | Stop reason: HOSPADM

## 2020-08-05 RX ORDER — LEVOTHYROXINE SODIUM 100 UG/1
100 TABLET ORAL
Status: DISCONTINUED | OUTPATIENT
Start: 2020-08-06 | End: 2020-08-06 | Stop reason: HOSPADM

## 2020-08-05 RX ORDER — GABAPENTIN 300 MG/1
300 CAPSULE ORAL 3 TIMES DAILY
Status: DISCONTINUED | OUTPATIENT
Start: 2020-08-06 | End: 2020-08-05

## 2020-08-05 RX ORDER — OXYCODONE AND ACETAMINOPHEN 5; 325 MG/1; MG/1
1 TABLET ORAL ONCE
Status: COMPLETED | OUTPATIENT
Start: 2020-08-05 | End: 2020-08-05

## 2020-08-05 RX ORDER — DULOXETIN HYDROCHLORIDE 20 MG/1
20 CAPSULE, DELAYED RELEASE ORAL DAILY
Status: DISCONTINUED | OUTPATIENT
Start: 2020-08-06 | End: 2020-08-06 | Stop reason: HOSPADM

## 2020-08-05 RX ORDER — PROCHLORPERAZINE MALEATE 5 MG
5 TABLET ORAL EVERY 6 HOURS PRN
Status: DISCONTINUED | OUTPATIENT
Start: 2020-08-05 | End: 2020-08-06 | Stop reason: HOSPADM

## 2020-08-05 RX ORDER — AMOXICILLIN 250 MG
2 CAPSULE ORAL 2 TIMES DAILY PRN
Status: DISCONTINUED | OUTPATIENT
Start: 2020-08-05 | End: 2020-08-06 | Stop reason: HOSPADM

## 2020-08-05 RX ORDER — POLYETHYLENE GLYCOL 3350 17 G/17G
17 POWDER, FOR SOLUTION ORAL DAILY PRN
Status: DISCONTINUED | OUTPATIENT
Start: 2020-08-05 | End: 2020-08-06 | Stop reason: HOSPADM

## 2020-08-05 RX ORDER — PANTOPRAZOLE SODIUM 20 MG/1
20 TABLET, DELAYED RELEASE ORAL DAILY
Status: DISCONTINUED | OUTPATIENT
Start: 2020-08-06 | End: 2020-08-06 | Stop reason: HOSPADM

## 2020-08-05 RX ORDER — NALOXONE HYDROCHLORIDE 0.4 MG/ML
.1-.4 INJECTION, SOLUTION INTRAMUSCULAR; INTRAVENOUS; SUBCUTANEOUS
Status: DISCONTINUED | OUTPATIENT
Start: 2020-08-05 | End: 2020-08-05

## 2020-08-05 RX ORDER — NALOXONE HYDROCHLORIDE 0.4 MG/ML
.1-.4 INJECTION, SOLUTION INTRAMUSCULAR; INTRAVENOUS; SUBCUTANEOUS
Status: DISCONTINUED | OUTPATIENT
Start: 2020-08-05 | End: 2020-08-06 | Stop reason: HOSPADM

## 2020-08-05 RX ADMIN — CARBIDOPA AND LEVODOPA 2 TABLET: 25; 100 TABLET ORAL at 20:06

## 2020-08-05 RX ADMIN — OXYCODONE HYDROCHLORIDE AND ACETAMINOPHEN 1 TABLET: 5; 325 TABLET ORAL at 18:37

## 2020-08-05 RX ADMIN — GABAPENTIN 300 MG: 300 CAPSULE ORAL at 20:06

## 2020-08-05 ASSESSMENT — ENCOUNTER SYMPTOMS
MYALGIAS: 1
ABDOMINAL PAIN: 0
BACK PAIN: 1
FEVER: 0
SHORTNESS OF BREATH: 0

## 2020-08-05 ASSESSMENT — MIFFLIN-ST. JEOR: SCORE: 1202.75

## 2020-08-05 NOTE — ED NOTES
Patient daughter, Irasema called.  Would like an update.  Can reach her on cell # in emergency contacts.  Stated that her brother Nicholas lives in the area and can pick patient up if/when she needs a ride.

## 2020-08-05 NOTE — ED PROVIDER NOTES
History     Chief Complaint   Patient presents with     Fall     Knee Pain     HPI  Mehreen Camaar is a 80 year old female who presents to the emergency department via EMS after the patient had a fall at her assisted living facility.  Initial history obtained from patient.  EMS was not present upon my evaluation of the patient.  EMS had administered 100 mcg intranasal fentanyl prior to patient arrival.  Patient tells me that she lives in her assisted living, ambulatory with the use of a walker, and sometimes without assistance..  Patient was walking earlier today, and subsequently tripped over her shoes, and landed in the closet.  She landed on bilateral knees.  Ultimately able to get back up and walk.  Then, later this evening, patient was walking, and subsequently tripped, mechanical fall in the kitchen, also landing on the knees.  Patient complains of pain all throughout her body, however mostly localized in the back, and bilateral knees.  She states she may have bumped her head.  There was no loss of consciousness.  Denies blood thinning medication use.    Allergies:  Allergies   Allergen Reactions     Penicillins Anaphylaxis, Rash and Shortness Of Breath     Aspirin Nausea and GI Disturbance     Atenolol Cough     Atorvastatin Muscle Pain (Myalgia) and Nausea and Vomiting     Bupropion Nausea     Clindamycin Other (See Comments)     Constipation      Codeine Nausea     Ibuprofen Nausea     Stomach upset     Lovastatin Muscle Pain (Myalgia)     Cephalexin Rash     Neck reddness       Problem List:    Patient Active Problem List    Diagnosis Date Noted     Fall 08/05/2020     Priority: Medium     Bilateral knee pain 08/05/2020     Priority: Medium     Generalized muscle weakness 08/05/2020     Priority: Medium     Depression 08/05/2020     Priority: Medium     Rupture of left Achilles tendon, sequela 12/31/2019     Priority: Medium     Abdominal pain, generalized 12/31/2019     Priority: Medium      Flatulence, eructation, and gas pain 08/28/2019     Priority: Medium     Post-op pain 12/12/2018     Priority: Medium     S/P laparoscopic cholecystectomy 12/12/2018     Priority: Medium     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     Priority: Medium     Movement disorder 09/19/2018     Priority: Medium     Rheumatic aortic stenosis 09/19/2018     Priority: Medium     Disorder of bursae and tendons in shoulder region 09/18/2018     Priority: Medium     Overview:   Created by Conversion    Replacement Utility updated for latest IMO load       Adjustment disorder with anxious mood 09/17/2018     Priority: Medium     Adjustment disorder with mixed anxiety and depressed mood 09/17/2018     Priority: Medium     Aneurysm of abdominal vessel (H) 09/17/2018     Priority: Medium     Overview:   Created by Conversion  [facility] Annotation: Jun 8 2011  8:07AM - Danni Ortiz: 4.4 on 11/2013.    Needs 6-12 month u/s or CT.    Replacement Utility updated for latest IMO load       Harris's esophagus 09/17/2018     Priority: Medium     Overview:   Created by Conversion  [facility] Annotation: Feb  3 2012  8:32AM Cameron Eisenberg: Needs EGD 2/2017       Bradycardia 09/17/2018     Priority: Medium     Cataract 09/17/2018     Priority: Medium     Overview:   Created by Conversion       Major depressive disorder, recurrent episode (H) 09/17/2018     Priority: Medium     Overview:   Created by Conversion    Replacement Utility updated for latest IMO load       Constipation 09/17/2018     Priority: Medium     Dizziness 09/17/2018     Priority: Medium     Overview:   Created by Conversion       Dyslipidemia 09/17/2018     Priority: Medium     Overview:   Created by Conversion       Esophageal reflux 09/17/2018     Priority: Medium     Overview:   Created by Conversion       Hypertension 09/17/2018     Priority: Medium     Overview:   Created by Conversion    Replacement Utility updated for latest IMO load       Hypothyroidism  09/17/2018     Priority: Medium     Overview:   Created by Conversion    Replacement Utility updated for latest IMO load       Insomnia due to anxiety and fear 09/17/2018     Priority: Medium     Lower back pain 09/17/2018     Priority: Medium     Osteoarthrosis 09/17/2018     Priority: Medium     Overview:   Created by Conversion    Replacement Utility updated for latest IMO load       Disorder of bone and cartilage 09/17/2018     Priority: Medium     Overview:   Created by Conversion  [facility] Annotation: Dec 13 2012  7:Roberta Padron: vit D/Ca, f/u   Dexa needed 1/2014.    Replacement Utility updated for latest IMO load       Lower extremity weakness 07/04/2018     Priority: Medium     Orthostatic hypotension dysautonomic syndrome (H) 09/22/2017     Priority: Medium     Primary insomnia 07/04/2017     Priority: Medium     Urinary incontinence in female 05/07/2017     Priority: Medium     Weakness 05/04/2017     Priority: Medium     S/P AAA repair using bifurcation graft 11/30/2015     Priority: Medium     Adrenal adenoma 11/20/2015     Priority: Medium     Overview:   Current hormonal evaluation through endocrinology          Past Medical History:    Past Medical History:   Diagnosis Date     Adrenal adenoma      Arthritis      Depressive disorder      Hypertension      Rheumatic fever      Small bowel obstruction (H)      Thyroid disease        Past Surgical History:    Past Surgical History:   Procedure Laterality Date     AAA REPAIR  2015    [facility] bifurcation graft     CARPAL TUNNEL RELEASE RT/LT Bilateral 2013     HYSTERECTOMY  2013     JOINT REPLACEMENT Right 2003     LAPAROSCOPIC CHOLECYSTECTOMY N/A 12/12/2018    Procedure: LAPAROSCOPIC CHOLECYSTECTOMY;  Surgeon: Amadeo Sebastian MD;  Location: [facility] OR     ORTHOPEDIC SURGERY       SPINE SURGERY  1981    cervical spine fusion     THYROIDECTOMY  2011       Family History:    Family History   Problem Relation Age of Onset     Hypertension Mother       Coronary Artery Disease Mother      Coronary Artery Disease Father      Other Cancer Brother      Parkinsonism Other        Social History:  Marital Status:   [5]  Social History     Tobacco Use     Smoking status: Former Smoker     Packs/day: 0.50     Types: Cigarettes     Last attempt to quit: 1994     Years since quittin.6     Smokeless tobacco: Never Used   Substance Use Topics     Alcohol use: No     Drug use: No        Medications:    No current outpatient medications on file.        Review of Systems   Constitutional: Negative for fever.   Respiratory: Negative for shortness of breath.    Cardiovascular: Negative for chest pain.   Gastrointestinal: Negative for abdominal pain.   Musculoskeletal: Positive for back pain and myalgias.   All other systems reviewed and are negative.      Physical Exam   BP: (!) 186/67  Pulse: 85  Temp: 98  F (36.7  C)  Resp: 18  Weight: 68.9 kg (152 lb)  SpO2: 95 %      Physical Exam  BP (!) 151/89   Pulse 74   Temp 98  F (36.7  C) (Oral)   Resp 18   Wt 68.9 kg (152 lb)   SpO2 97%   BMI 24.53 kg/m    General: alert, interactive, in no apparent distress.  Moving all extremities.  Appearing old, and frail  Head: atraumatic  Nose: no rhinorrhea or epistaxis  Ears: no external auditory canal discharge or bleeding.    Eyes: Sclera nonicteric. Conjunctiva noninjected.    Mouth: no tonsillar erythema, edema, or exudate  Neck: supple, no palp LAD  Lungs: CTAB  CV: S1/S2; peripheral pulses palpable and symmetric  Abdomen: soft, nt, nd, no guarding or rebound. Positive bowel sounds  Extremities: no cyanosis or edema.  Some mild diffuse tenderness around the knees, however no focal areas of tenderness.  Back: No midline back tenderness.  Skin: no rash or diaphoresis  Neuro:  sensation intact to light touch in UE and LEs bilaterally;       ED Course        Procedures               Critical Care time:  none               Results for orders placed or performed during the  hospital encounter of 08/05/20 (from the past 24 hour(s))   XR Pelvis 1/2 Views    Narrative    EXAM: XR PELVIS 1/2 VW  LOCATION: [facility]  DATE/TIME: 8/5/2020 6:18 PM    INDICATION: Pelvic pain.  COMPARISON: None.      Impression    IMPRESSION: Right total hip arthroplasty in place without evidence of complication. No evidence of acute fracture or subluxation. Aortoiliac stent identified.   Knee XR, 2 views, bilateral    Narrative    EXAM: XR KNEE BILATERAL 1/2 VW  LOCATION: [facility]  DATE/TIME: 8/5/2020 6:18 PM    INDICATION: Bilateral knee pain.  COMPARISON: Right knee x-ray from 04/06/2020.      Impression    IMPRESSION:   RIGHT KNEE: Mild medial compartment joint space narrowing. No evidence of fracture or effusion.    LEFT KNEE: Total knee arthroplasty in place. No evidence of fracture or loosening.   CBC with platelets differential   Result Value Ref Range    WBC 5.0 4.0 - 11.0 10e9/L    RBC Count 4.43 3.8 - 5.2 10e12/L    Hemoglobin 14.1 11.7 - 15.7 g/dL    Hematocrit 42.9 35.0 - 47.0 %    MCV 97 78 - 100 fl    MCH 31.8 26.5 - 33.0 pg    MCHC 32.9 31.5 - 36.5 g/dL    RDW 11.4 10.0 - 15.0 %    Platelet Count 193 150 - 450 10e9/L    Diff Method Automated Method     % Neutrophils 60.7 %    % Lymphocytes 28.4 %    % Monocytes 8.7 %    % Eosinophils 1.2 %    % Basophils 0.8 %    % Immature Granulocytes 0.2 %    Nucleated RBCs 0 0 /100    Absolute Neutrophil 3.1 1.6 - 8.3 10e9/L    Absolute Lymphocytes 1.4 0.8 - 5.3 10e9/L    Absolute Monocytes 0.4 0.0 - 1.3 10e9/L    Absolute Eosinophils 0.1 0.0 - 0.7 10e9/L    Absolute Basophils 0.0 0.0 - 0.2 10e9/L    Abs Immature Granulocytes 0.0 0 - 0.4 10e9/L    Absolute Nucleated RBC 0.0        Medications   carbidopa-levodopa (SINEMET)  MG per tablet 2 tablet (2 tablets Oral Given 8/5/20 2006)   gabapentin (NEURONTIN) capsule 300 mg (300 mg Oral Given 8/5/20 2006)   oxyCODONE-acetaminophen (PERCOCET) 5-325 MG per tablet 1 tablet (1  tablet Oral Given 8/5/20 5138)       Assessments & Plan (with Medical Decision Making)  80 year old female, presenting to the emergency department with concerns regarding back pain, in addition to bilateral knee pain in the context of 2 falls throughout the day today.  Patient presents via EMS and received 100 mcg of fentanyl via intranasal injection prior to emergency department arrival.  Patient complains of primarily knee pain.  Had 2 mechanical falls throughout the day, once in her closet, and another time in the kitchen.  Did not hit her head or lose consciousness.  Complaining of knee pain, in addition to back pain.  However does have issues with chronic pain, and has had similar types of back pains previously.  No midline back tenderness to palpation, and I do not feel that any x-rays are indicated of the back.  Does have bilateral knee pain, and x-rays are performed, reviewed by myself in addition to radiology interpretation without evidence of fracture.  Patient was given oxycodone during ED course.  We attempted to ambulate the patient, however unable to walk, complaining of severe generalized weakness, in addition to pain diffusely throughout her entire body.  Therefore, patient is not safe for discharge home.  Will require physical therapy, Occupational Therapy, and likely increase level of care at home as she is currently in assisted living facility.  No COVID symptoms.  Labs pending at time of signout to hospitalist.     I have reviewed the nursing notes.    I have reviewed the findings, diagnosis, plan and need for follow up with the patient.       ED to Inpatient Handoff:    Discussed with AYALA Menjivar   Patient accepted for Observation Stay  Pending studies include CBC, BMP, covid  Code Status: Not Addressed           Current Discharge Medication List          Final diagnoses:   Fall, initial encounter   Acute pain of both knees   Weakness   Acute bilateral low back pain with bilateral sciatica        8/5/2020   [facility] EMERGENCY DEPARTMENT     Amadeo Negron MD  08/05/20 [id number]

## 2020-08-05 NOTE — ED TRIAGE NOTES
Pt fell x 2 today. Pt from assisted living. Pt c/o bilat knee pain. Unable to get up after 2nd fall. No LOC, no blood thinner.

## 2020-08-06 ENCOUNTER — APPOINTMENT (OUTPATIENT)
Dept: PHYSICAL THERAPY | Facility: CLINIC | Age: 80
End: 2020-08-06
Payer: COMMERCIAL

## 2020-08-06 ENCOUNTER — APPOINTMENT (OUTPATIENT)
Dept: OCCUPATIONAL THERAPY | Facility: CLINIC | Age: 80
End: 2020-08-06
Payer: COMMERCIAL

## 2020-08-06 VITALS
WEIGHT: 157.85 LBS | BODY MASS INDEX: 25.37 KG/M2 | RESPIRATION RATE: 18 BRPM | DIASTOLIC BLOOD PRESSURE: 63 MMHG | HEIGHT: 66 IN | OXYGEN SATURATION: 97 % | HEART RATE: 75 BPM | SYSTOLIC BLOOD PRESSURE: 117 MMHG | TEMPERATURE: 98.3 F

## 2020-08-06 LAB
ANION GAP SERPL CALCULATED.3IONS-SCNC: <1 MMOL/L (ref 3–14)
BUN SERPL-MCNC: 13 MG/DL (ref 7–30)
CALCIUM SERPL-MCNC: 8.8 MG/DL (ref 8.5–10.1)
CHLORIDE SERPL-SCNC: 103 MMOL/L (ref 94–109)
CO2 SERPL-SCNC: 36 MMOL/L (ref 20–32)
CREAT SERPL-MCNC: 0.51 MG/DL (ref 0.52–1.04)
ERYTHROCYTE [DISTWIDTH] IN BLOOD BY AUTOMATED COUNT: 11.4 % (ref 10–15)
GFR SERPL CREATININE-BSD FRML MDRD: >90 ML/MIN/{1.73_M2}
GLUCOSE SERPL-MCNC: 107 MG/DL (ref 70–99)
HCT VFR BLD AUTO: 42.7 % (ref 35–47)
HGB BLD-MCNC: 14 G/DL (ref 11.7–15.7)
LABORATORY COMMENT REPORT: NORMAL
MCH RBC QN AUTO: 32 PG (ref 26.5–33)
MCHC RBC AUTO-ENTMCNC: 32.8 G/DL (ref 31.5–36.5)
MCV RBC AUTO: 98 FL (ref 78–100)
PLATELET # BLD AUTO: 190 10E9/L (ref 150–450)
POTASSIUM SERPL-SCNC: 3.4 MMOL/L (ref 3.4–5.3)
RBC # BLD AUTO: 4.38 10E12/L (ref 3.8–5.2)
SARS-COV-2 RNA SPEC QL NAA+PROBE: NEGATIVE
SARS-COV-2 RNA SPEC QL NAA+PROBE: NORMAL
SODIUM SERPL-SCNC: 139 MMOL/L (ref 133–144)
SPECIMEN SOURCE: NORMAL
SPECIMEN SOURCE: NORMAL
WBC # BLD AUTO: 5.1 10E9/L (ref 4–11)

## 2020-08-06 PROCEDURE — 97161 PT EVAL LOW COMPLEX 20 MIN: CPT | Mod: GP | Performed by: PHYSICAL THERAPIST

## 2020-08-06 PROCEDURE — 80048 BASIC METABOLIC PNL TOTAL CA: CPT | Performed by: PHYSICIAN ASSISTANT

## 2020-08-06 PROCEDURE — 99217 ZZC OBSERVATION CARE DISCHARGE: CPT | Performed by: FAMILY MEDICINE

## 2020-08-06 PROCEDURE — 25000132 ZZH RX MED GY IP 250 OP 250 PS 637: Performed by: PHYSICIAN ASSISTANT

## 2020-08-06 PROCEDURE — 85027 COMPLETE CBC AUTOMATED: CPT | Performed by: PHYSICIAN ASSISTANT

## 2020-08-06 PROCEDURE — 97165 OT EVAL LOW COMPLEX 30 MIN: CPT | Mod: GO

## 2020-08-06 PROCEDURE — 25000132 ZZH RX MED GY IP 250 OP 250 PS 637: Performed by: EMERGENCY MEDICINE

## 2020-08-06 PROCEDURE — 99207 ZZC CDG-CODE CATEGORY CHANGED: CPT | Performed by: FAMILY MEDICINE

## 2020-08-06 PROCEDURE — 36415 COLL VENOUS BLD VENIPUNCTURE: CPT | Performed by: PHYSICIAN ASSISTANT

## 2020-08-06 PROCEDURE — G0378 HOSPITAL OBSERVATION PER HR: HCPCS

## 2020-08-06 RX ORDER — GABAPENTIN 300 MG/1
300 CAPSULE ORAL 3 TIMES DAILY
Status: DISCONTINUED | OUTPATIENT
Start: 2020-08-07 | End: 2020-08-06 | Stop reason: HOSPADM

## 2020-08-06 RX ORDER — HYDROCODONE BITARTRATE AND ACETAMINOPHEN 5; 325 MG/1; MG/1
1 TABLET ORAL 2 TIMES DAILY
Qty: 6 TABLET | Refills: 0 | Status: SHIPPED | DISCHARGE
Start: 2020-08-06 | End: 2020-08-06

## 2020-08-06 RX ORDER — SERTRALINE HYDROCHLORIDE 25 MG/1
75 TABLET, FILM COATED ORAL
COMMUNITY
Start: 2020-05-21 | End: 2020-08-08

## 2020-08-06 RX ORDER — OXYCODONE AND ACETAMINOPHEN 5; 325 MG/1; MG/1
1 TABLET ORAL EVERY 4 HOURS PRN
Status: DISCONTINUED | OUTPATIENT
Start: 2020-08-06 | End: 2020-08-06 | Stop reason: HOSPADM

## 2020-08-06 RX ORDER — HYDROCODONE BITARTRATE AND ACETAMINOPHEN 5; 325 MG/1; MG/1
1 TABLET ORAL EVERY 8 HOURS PRN
Qty: 10 TABLET | Refills: 0 | Status: SHIPPED | DISCHARGE
Start: 2020-08-06 | End: 2020-08-11

## 2020-08-06 RX ADMIN — LOSARTAN POTASSIUM 50 MG: 50 TABLET, FILM COATED ORAL at 09:44

## 2020-08-06 RX ADMIN — CARBIDOPA AND LEVODOPA 2 TABLET: 25; 100 TABLET ORAL at 13:20

## 2020-08-06 RX ADMIN — PANTOPRAZOLE SODIUM 20 MG: 20 TABLET, DELAYED RELEASE ORAL at 09:45

## 2020-08-06 RX ADMIN — LEVOTHYROXINE SODIUM 100 MCG: 100 TABLET ORAL at 06:26

## 2020-08-06 RX ADMIN — MICONAZOLE NITRATE: 20 POWDER TOPICAL at 00:43

## 2020-08-06 RX ADMIN — OXYCODONE HYDROCHLORIDE AND ACETAMINOPHEN 1 TABLET: 5; 325 TABLET ORAL at 13:44

## 2020-08-06 RX ADMIN — DULOXETINE HYDROCHLORIDE 20 MG: 20 CAPSULE, DELAYED RELEASE ORAL at 09:48

## 2020-08-06 RX ADMIN — OXYCODONE HYDROCHLORIDE AND ACETAMINOPHEN 1 TABLET: 5; 325 TABLET ORAL at 00:44

## 2020-08-06 RX ADMIN — CARBIDOPA AND LEVODOPA 2 TABLET: 25; 100 TABLET ORAL at 09:43

## 2020-08-06 RX ADMIN — HYDROCHLOROTHIAZIDE 25 MG: 25 TABLET ORAL at 09:44

## 2020-08-06 RX ADMIN — MICONAZOLE NITRATE: 20 POWDER TOPICAL at 09:42

## 2020-08-06 NOTE — PLAN OF CARE
Discharge Planner OT   Patient plan for discharge: Open to TCU     Current status: Eval complete. Tearful at times. Oriented to person, place, time and situation. Mod A for sit to stand from bedside chair. CGA to transfer to EOB using FWW. LOB while nearing edge of bed and needs cues for safety. Mod A for sit to supine.     Barriers to return to prior living situation: assist level, weakness     Recommendations for discharge: TCU     Rationale for recommendations:  to increase independence with ADLs and functional mobility        Entered by: Haydee Simon 08/06/2020 10:36 AM

## 2020-08-06 NOTE — ED NOTES
Patient has  [place] to Observation  order. Patient has been given Patient Bill of Rights, Observation brochure and  What does Observation mean to me  forms.  Patient has been given the opportunity to ask questions about observation status and their plan of care.  Patient has been oriented to the observation room, bathroom, and call light is in place.    Emelyn Short RN

## 2020-08-06 NOTE — PROGRESS NOTES
Pt bed alarm went off and pt was sitting on the edge of the bed.  Pt was somewhat confused about where she was and how she got her.  We put on gait belt, walker and her L shoe and she got up and walked into the bathroom to void.  Pt now back in bed with assisst of 2and looks comfortable.  CORINNE Champagne RN

## 2020-08-06 NOTE — PLAN OF CARE
[facility] DISCHARGE NOTE    Patient discharged to transitional care unit at 3:43 PM via wheel chair. Accompanied by daughter and staff. Discharge instructions reviewed with  report was called to Sara gallagher at [facility] at 1427 , opportunity offered to ask questions. Prescriptions sent with patient to fill . All belongings sent with patient.    Jaymie Singh RN

## 2020-08-06 NOTE — PROGRESS NOTES
Skin affirmation note    Admitting nurse completed full skin assessment, Adal score and Adal interventions. This writer agrees with the initial skin assessment findings.

## 2020-08-06 NOTE — DISCHARGE SUMMARY
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    For the pain  -Most of your pain is your chronic back and neck pain.  There is some new knee pain from the falls.  Would try not to go up on your Norco dose which you have been taking at 3 times per day on average.,  Use ice to the knees.    There is no evidence of COVID, or COVID exposure.  Isolation is not required.      Follow up with primary care provider in 7 days        Procedures:       Results for orders placed or performed during the hospital encounter of 08/05/20   XR Pelvis 1/2 Views    Narrative    EXAM: XR PELVIS 1/2 VW  LOCATION: [facility]  DATE/TIME: 8/5/2020 6:18 PM    INDICATION: Pelvic pain.  COMPARISON: None.      Impression    IMPRESSION: Right total hip arthroplasty in place without evidence of complication. No evidence of acute fracture or subluxation. Aortoiliac stent identified.   Knee XR, 2 views, bilateral    Narrative    EXAM: XR KNEE BILATERAL 1/2 VW  LOCATION: [facility]  DATE/TIME: 8/5/2020 6:18 PM    INDICATION: Bilateral knee pain.  COMPARISON: Right knee x-ray from 04/06/2020.      Impression    IMPRESSION:   RIGHT KNEE: Mild medial compartment joint space narrowing. No evidence of fracture or effusion.    LEFT KNEE: Total knee  arthroplasty in place. No evidence of fracture or loosening.                    Allergies:      Allergies   Allergen Reactions     Penicillins Anaphylaxis, Rash and Shortness Of Breath     Aspirin Nausea and GI Disturbance     Atenolol Cough     Atorvastatin Muscle Pain (Myalgia) and Nausea and Vomiting     Bupropion Nausea     Clindamycin Other (See Comments)     Constipation      Codeine Nausea     Ibuprofen Nausea     Stomach upset     Lovastatin Muscle Pain (Myalgia)     Cephalexin Rash     Neck reddness                  Discharge Medications:     Current Discharge Medication List      CONTINUE these medications which have CHANGED    Details   !! HYDROcodone-acetaminophen (NORCO) 5-325 MG tablet Take 1 tablet by mouth 2 times daily for 3 days  Qty: 6 tablet, Refills: 0    Associated Diagnoses: Acute bilateral low back pain with bilateral sciatica      !! HYDROcodone-acetaminophen (NORCO) 5-325 MG tablet Take 1 tablet by mouth every 8 hours as needed for severe pain  Qty: 10 tablet, Refills: 0    Associated Diagnoses: Acute bilateral low back pain with bilateral sciatica       !! - Potential duplicate medications found. Please discuss with provider.      CONTINUE these medications which have NOT CHANGED    Details   acetaminophen (TYLENOL) 500 MG tablet Take 500 mg by mouth 3 times daily       bisacodyl (DULCOLAX) 10 MG suppository Place 10 mg rectally daily as needed for constipation      bisacodyl (DULCOLAX) 5 MG EC tablet Take 5 mg by mouth daily (and a second time if needed)      calcium carbonate (TUMS) 500 MG chewable tablet Take 1 tablet (500 mg) by mouth every 2 hours as needed for heartburn  Qty: 150 tablet, Refills: 1    Associated Diagnoses: Nausea      carbidopa-levodopa (SINEMET)  MG tablet Take 2 tablets by mouth 3 times daily (0630, 1230, 1830)      clotrimazole (LOTRIMIN) 1 % external cream Apply topically 2 times daily as needed      diclofenac (VOLTAREN) 1 % topical gel Apply 4 g topically  4 times daily as needed for moderate pain (to back and neck)  Qty: 100 g, Refills: 11    Associated Diagnoses: Primary osteoarthritis involving multiple joints      gabapentin (NEURONTIN) 100 MG capsule Take 300 mg by mouth 3 times daily       GAS RELIEF 125 MG CAPS TAKE 1 CAPSULE BY MOUTH FOUR TIMES DAILY AS NEEDED WITH MEALS  Qty: 30 capsule, Refills: 11    Associated Diagnoses: Flatulence, eructation, and gas pain      hydrochlorothiazide (HYDRODIURIL) 25 MG tablet Take 1 tablet (25 mg) by mouth daily    Associated Diagnoses: Essential hypertension      levothyroxine (SYNTHROID/LEVOTHROID) 100 MCG tablet Take 1 tablet (100 mcg) by mouth daily  Qty:      Associated Diagnoses: Hypothyroidism, unspecified type      losartan (COZAAR) 50 MG tablet TAKE TABLET BY MOUTH EVERY MORNING  Qty: 30 tablet, Refills: 11    Associated Diagnoses: Essential hypertension      pantoprazole (PROTONIX) 20 MG EC tablet Take 20 mg by mouth daily      Polyethylene Glycol 3350 (MIRALAX PO) Take 1 capful by mouth daily      polyethylene glycol-propylene glycol (SYSTANE ULTRA) 0.4-0.3 % SOLN ophthalmic solution Place 2 drops into both eyes 3 times daily (and additionally as needed/requested)      sertraline (ZOLOFT) 25 MG tablet Take 75 mg by mouth daily (with lunch)      vitamin D3 (CHOLECALCIFEROL) 2000 units (50 mcg) tablet Take 2,000 Units by mouth daily      Zinc Oxide 13 % CREA Apply topically daily       clindamycin (CLEOCIN) 300 MG capsule Take 600 mg by mouth as needed Give 1 hour prior to dental appoitments      DULoxetine (CYMBALTA) 20 MG capsule Take 20 mg by mouth daily      magnesium citrate solution Take 296 mLs by mouth as needed for other                   Consultations:   None           Brief History of Presenting Illness:   Mehreen Camara is a 80 year old female with past medical history of hypertension, arthritis, depression, GERD, multiple system atrophy patient, hypothyroidism, aortic stenosis, orthostatic  "hypotension/autonomic dysfunction who now presents on 8/5/2020 with recent fall at assisted living and now having knee pain.      Patient reports that this morning she was getting something out of her closet, when she tripped over something on the floor and fell forward landing on her knees. She was able to get up from this fall and rested throughout the day. Later this evening she was walking in her kitchen when again she reported falling over something on the floor when she was leaning forward and fell to her knees and her butt. She was not able to get off the floor by herself.  She admits to \"pain all over.\" Notes pain is worse in bilateral knees, and low back. She does admit to having chronic pain related to osteoarthritis including bilateral shoulders, bilateral knees, back pain, and leg pain.     She denies feeling dizzy prior to her falls today.  However, reports that in the past she does feel dizzy with ambulation and has had falls related to this and relates this to orthostatic hypotension.  She notes that usually she can feel her dizziness coming on and is able to make it to a chair or sit down prior to falling.     She admits to double vision, which she reports has been a chronic issue for quite some time. This comes and goes and is not constant.     She denies having chest pain or feeling short of breath.     She does admit to some lower abdominal  discomfort which she notes has been present for quite some time. She has a history of constipation and takes various medications outpatient. She relates this to history of scoliosis.      She reports that she feels like her new shoes are the culprit for recent falls and difficulty with ambulating.      She uses a walker at baseline.     Patient lives in assisted living.     No known sick contacts.          Hospital Course:   Fall, mechanical, x2  Generalized muscle weakness  Bilateral knee pain  Generalized pain   Patient reports her weakness has been getting " worse over the past few months, she feels like she has not been able to do much in terms of activity. Admits to chronic generalized pain including bilateral shoulders, back pain, left ankle pain related to recent Achilles tendon tear, bilateral knee pain and leg pain. She had a fall this morning while she was trying to get something out of a closet, she was able to get up and rested throughout the day. She then had a fall this afternoon when she was leaning over in the kitchen and she tripped over a pair of shoes. She reports that her pain intensified today following her fall. XR bilateral knees with no evidence of acute fracture.  X-ray of pelvis with no evidence of acute fracture. Certainly also have to consider possibility of compression fracture. She was given dose of percocet in ED. CT in 05/2020 with results listed below.    - Physical therapy and occupational therapy evaluations. Discharge planning to determine if okay to return to assisted living vs needs for TCU.   - Could consider CT of back if pain in not improving to rule out compression fracture.    - Up with stand by assist.    - Fall precautions in place.    - PRN tylenol for pain, avoid narcotics if possible.       Esophageal reflux  Patient managed prior to admission with Protonix 20 mg daily.   - Continue home medication.     Hypertension  Chronic.  Patient managed prior to admission with hydrochlorothiazide 25 mg daily, losartan 50 mg every morning.   - Continue to monitor blood pressure.   - Continue home medications.     Hypothyroidism  Chronic. Patient managed prior to admission with levothyroxine 100 MCG every morning.   - Continue home medication.      Multiple system atrophy P   Movement disorder  Patient follows with neurology as an outpatient.  She is managed with Sinemet 2 tablets 3 times daily, gabapentin 300 mg 3 times daily. She admits to chronic weakness and trouble with ambulating for the past few months.    - Continue outpatient  neurology follow-up.    - Continue home medications.     Depression  REM sleep behavior disorder  Patient managed prior to admission with duloxetine 20 mg daily.   - Continue home medication.     Rule Out COVID-19 infection  This patient was evaluated during a global COVID-19 pandemic, which necessitated consideration that the patient might be at risk for infection with the SARS-CoV-2 virus that causes COVID-19. Applicable protocols for evaluation were followed during the patient's care. LOW suspicion for infection.   - No precautions in place.             Discharge Exam:   OBJECTIVE:     GENERAL APPEARANCE: healthy, alert and no distress     RESP: lungs clear to auscultation - no rales, rhonchi or wheezes     CV: regular rates and rhythm, normal S1 S2, no S3 or S4 and  2/6 systolic murmer at LSB radiating into both carotids, , no click or rub - no edema     Abdomen: soft, nontender, no HSM or masses and bowel sounds normal  Skin: no cyanosis, pallor, or jaundice             Pending Tests at Discharge:     Unresulted Labs Ordered in the Past 30 Days of this Admission     No orders found for last 31 day(s).                   Discharge Disposition:   Discharged to rehabilitation facility      Attestation:  Amount of time performed on this discharge : 45 minutes.    Cameron Langley MD MD

## 2020-08-06 NOTE — PLAN OF CARE
"[facility] ADMISSION NOTE    Patient admitted to room 2315 at approximately 2130 via cart from emergency room. Patient was accompanied by transport tech.     Verbal SBAR report received from Becky ROSALES prior to patient arrival.     Patient trasferred to bed via air [place]. Patient alert and oriented X 3. Pain is not well controlled.  Medication(s) being used: narcotic analgesics including percocet. 0-10 Pain Scale: 8. Admission vital signs: Blood pressure (!) 180/76, pulse 74, temperature 98.1  F (36.7  C), temperature source Oral, resp. rate 18, height 1.676 m (5' 6\"), weight 71.6 kg (157 lb 13.6 oz), SpO2 98 %, not currently breastfeeding. Patient was oriented to plan of care, call light, bed controls, tv, telephone, bathroom, and visiting hours.     Risk Assessment    The following safety risks were identified during admission: fall and skin. Yellow risk band applied: YES.     Skin Initial Assessment    This writer admitted this patient and completed a full skin assessment and Adal score in the Adult PCS flowsheet. Appropriate interventions initiated as needed.     Secondary skin check completed by Lili ROSALES.    Adal Risk Assessment  Sensory Perception: 4-->no impairment  Moisture: 3-->occasionally moist  Activity: 3-->walks occasionally  Mobility: 3-->slightly limited  Nutrition: 3-->adequate  Friction and Shear: 3-->no apparent problem  Adal Score: 19    Education    Patient has a [place] to Observation order: Yes  Observation education completed and documented: Yes    Pt skin has bruises, scabs, very dry and flakey skin and on arms there is Patchy redness under flaky skin.  Pt has a lg christian rectal rash, antifungal  powder and barrier cream ordered.  We did get pt OOB to use the commode.  She was not happy.  Pt kept saying ,\"I can't\" as we moved from bed to commode.  If given time to do it at her pace, pt did all the work herself with guidance from 2 RNs.  Pt stated she \"could not go home in all this pain\".  Pt " seems to be trying to cry to get her point across, but I told her I believe her pain is bad.  Pt states she hasn't eaten in 2 days, hasn't slept in 2 days,and was in pain.  We got pt a snack, pain medicated and pt now sleeping.      Pt Pain is elusive much of the time because pt just talks and says nothing.  Main pain that I see and hear is her back, severe back pain that radiates down the back of her legs bilaterally. Pt able to move herself around  In bed and can get up with asisstance.        Elsa Champagne RN

## 2020-08-06 NOTE — H&P
[facility]    History and Physical  Hospital Medicine       Date of Admission:  8/5/2020  Date of Service: 8/5/2020     Assessment & Plan   Mehreen Camara is a 80 year old female who presents on 8/5/2020 following a fall x2 at home with bilateral knee pain and back pain.     Fall, mechanical, x2  Generalized muscle weakness  Bilateral knee pain  Generalized pain   Patient reports her weakness has been getting worse over the past few months, she feels like she has not been able to do much in terms of activity. Admits to chronic generalized pain including bilateral shoulders, back pain, left ankle pain related to recent Achilles tendon tear, bilateral knee pain and leg pain. She had a fall this morning while she was trying to get something out of a closet, she was able to get up and rested throughout the day. She then had a fall this afternoon when she was leaning over in the kitchen and she tripped over a pair of shoes. She reports that her pain intensified today following her fall. XR bilateral knees with no evidence of acute fracture.  X-ray of pelvis with no evidence of acute fracture. Certainly also have to consider possibility of compression fracture. She was given dose of percocet in ED. CT in 05/2020 with results listed below.    - Physical therapy and occupational therapy evaluations. Discharge planning to determine if okay to return to assisted living vs needs for TCU.   - Could consider CT of back if pain in not improving to rule out compression fracture.    - Up with stand by assist.    - Fall precautions in place.    - PRN tylenol for pain, avoid narcotics if possible.       Esophageal reflux  Patient managed prior to admission with Protonix 20 mg daily.   - Continue home medication.     Hypertension  Chronic.  Patient managed prior to admission with hydrochlorothiazide 25 mg daily, losartan 50 mg every morning.   - Continue to monitor blood pressure.   - Continue  home medications.     Hypothyroidism  Chronic. Patient managed prior to admission with levothyroxine 100 MCG every morning.   - Continue home medication.      Multiple system atrophy P   Movement disorder  Patient follows with neurology as an outpatient.  She is managed with Sinemet 2 tablets 3 times daily, gabapentin 300 mg 3 times daily. She admits to chronic weakness and trouble with ambulating for the past few months.    - Continue outpatient neurology follow-up.    - Continue home medications.    Depression  REM sleep behavior disorder  Patient managed prior to admission with duloxetine 20 mg daily.   - Continue home medication.    Rule Out COVID-19 infection  This patient was evaluated during a global COVID-19 pandemic, which necessitated consideration that the patient might be at risk for infection with the SARS-CoV-2 virus that causes COVID-19. Applicable protocols for evaluation were followed during the patient's care. LOW suspicion for infection.   - No precautions in place.        Diet: Regular Diet Adult    DVT Prophylaxis: Ambulate every shift  Diaz Catheter: not present  Code Status: DNR/DNI    Disposition Plan   Expected discharge: 1 -2 days, recommended to prior living arrangementvs TCU once adequate pain management/ tolerating PO medications, mental status at baseline and safe disposition plan/ TCU bed available.  Entered: Thelma Menjivar PA-C 08/05/2020, 10:23 PM       Status: Patient is appropriate for admission to observation for further evaluation and treatment.     Thelma Menjivar PA-C        The patient's care was discussed with the Attending Physician, Dr.Mark Sierra, ED provider, Dr. Negron and the patient.     Primary Care Physician   Cathy aSrgent [phone]    History is obtained from the patient, Mehreen Camara and review of old records via the EMR.    History of Present Illness   Mehreen Camara is a 80 year old female with past medical history of hypertension,  "arthritis, depression, GERD, multiple system atrophy patient, hypothyroidism, aortic stenosis, orthostatic hypotension/autonomic dysfunction who now presents on 8/5/2020 with recent fall at assisted living and now having knee pain.     Patient reports that this morning she was getting something out of her closet, when she tripped over something on the floor and fell forward landing on her knees. She was able to get up from this fall and rested throughout the day. Later this evening she was walking in her kitchen when again she reported falling over something on the floor when she was leaning forward and fell to her knees and her butt. She was not able to get off the floor by herself.  She admits to \"pain all over.\" Notes pain is worse in bilateral knees, and low back. She does admit to having chronic pain related to osteoarthritis including bilateral shoulders, bilateral knees, back pain, and leg pain.    She denies feeling dizzy prior to her falls today.  However, reports that in the past she does feel dizzy with ambulation and has had falls related to this and relates this to orthostatic hypotension.  She notes that usually she can feel her dizziness coming on and is able to make it to a chair or sit down prior to falling.    She admits to double vision, which she reports has been a chronic issue for quite some time. This comes and goes and is not constant.    She denies having chest pain or feeling short of breath.    She does admit to some lower abdominal  discomfort which she notes has been present for quite some time. She has a history of constipation and takes various medications outpatient. She relates this to history of scoliosis.     She reports that she feels like her new shoes are the culprit for recent falls and difficulty with ambulating.     She uses a walker at baseline.    Patient lives in assisted living.    No known sick contacts.    Patient had  spine CT completed on 5/16/2020 for back pain which " revealed the following: Moderate left T8-T9 and T9-T10 neural foraminal stenosis. Mild multilevel spondylosis.Moderate broad-based lower thoracic dextroscoliosis; new Schmorl's node deformities in the apposing endplates at the L3-L4 level that may be painful. Schmorl's node deformity in the superior L5 endplate again noted. Vertebral body heights and contours of the lumbar spine are otherwise   normal. Heterogeneous density of the L5 vertebral body likely representing a hemangioma is again noted without change. There is no evidence for traumatic or insufficiency fracture of the lumbar spine. Left convex curvature of the lumbar spine centered at the L3 level again noted. Alignment of the lumbar vertebrae is otherwise normal.  CANAL/FORAMINA: There is moderate degenerative spinal canal stenosis at the L3-L4 level that is not appreciably changed. There is no other significant spinal canal narrowing of the lumbar spine. There is moderate degenerative neural foraminal narrowing  on the right at L2-L3 and bilaterally at L3-L4.      Review of Systems   CONSTITUTIONAL:  positive for  fatigue  EYES:  positive for  double vision  HEENT:  negative for  nasal congestion and sore throat  RESPIRATORY:  negative for  dry cough, cough with sputum and dyspnea  CARDIOVASCULAR:  negative for  chest pain, dyspnea  GASTROINTESTINAL:  positive for abdominal discomfort   GENITOURINARY:  positive for frequency  HEMATOLOGIC/LYMPHATIC:  negative for easy bruising and bleeding  ENDOCRINE:  negative for diabetic symptoms including neuropathy  MUSCULOSKELETAL:  positive for  myalgias, arthralgias, pain, stiff joints, decreased range of motion and muscle weakness  NEUROLOGICAL:  negative for headaches and dizziness  BEHAVIOR/PSYCH:  negative for depressed mood and anxiety    Past Medical History    Past Medical History:   Diagnosis Date     Adrenal adenoma     stable, thought not to be hormonally active     Arthritis      Depressive disorder       Hypertension      Rheumatic fever     thought to have had as a child     Small bowel obstruction (H)      Thyroid disease      Patient Active Problem List    Diagnosis Date Noted     Fall 08/05/2020     Priority: Medium     Bilateral knee pain 08/05/2020     Priority: Medium     Generalized muscle weakness 08/05/2020     Priority: Medium     Depression 08/05/2020     Priority: Medium     REM sleep behavior disorder 08/05/2020     Priority: Medium     Rupture of left Achilles tendon, sequela 12/31/2019     Priority: Medium     Abdominal pain, generalized 12/31/2019     Priority: Medium     Flatulence, eructation, and gas pain 08/28/2019     Priority: Medium     Post-op pain 12/12/2018     Priority: Medium     S/P laparoscopic cholecystectomy 12/12/2018     Priority: Medium     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     Priority: Medium     Movement disorder 09/19/2018     Priority: Medium     Rheumatic aortic stenosis 09/19/2018     Priority: Medium     Disorder of bursae and tendons in shoulder region 09/18/2018     Priority: Medium     Overview:   Created by Conversion    Re[employer] Utility updated for latest IMO load       Adjustment disorder with anxious mood 09/17/2018     Priority: Medium     Adjustment disorder with mixed anxiety and depressed mood 09/17/2018     Priority: Medium     Aneurysm of abdominal vessel (H) 09/17/2018     Priority: Medium     Overview:   Created by Conversion  [employer] [facility] Annotation: Jun 8 2011  8:07AM - Danni Ortiz: 4.4 on 11/2013.    Needs 6-12 month u/s or CT.    Replacement Utility updated for latest IMO load       Harris's esophagus 09/17/2018     Priority: Medium     Overview:   Created by Conversion  [employer] [facility] Annotation: Feb  3 2012  8:32AM - Cameron Obando: Needs EGD 2/2017       Bradycardia 09/17/2018     Priority: Medium     Cataract 09/17/2018     Priority: Medium     Overview:   Created by Conversion       Major depressive disorder, recurrent episode (H)  09/17/2018     Priority: Medium     Overview:   Created by Conversion    Replacement Utility updated for latest IMO load       Constipation 09/17/2018     Priority: Medium     Dizziness 09/17/2018     Priority: Medium     Overview:   Created by Conversion       Dyslipidemia 09/17/2018     Priority: Medium     Overview:   Created by Conversion       Esophageal reflux 09/17/2018     Priority: Medium     Overview:   Created by Conversion       Hypertension 09/17/2018     Priority: Medium     Overview:   Created by Conversion    [employer] Utility updated for latest IMO load       Hypothyroidism 09/17/2018     Priority: Medium     Overview:   Created by Conversion    Replacement Utility updated for latest IMO load       Insomnia due to anxiety and fear 09/17/2018     Priority: Medium     Lower back pain 09/17/2018     Priority: Medium     Osteoarthrosis 09/17/2018     Priority: Medium     Overview:   Created by Conversion    Replacement Utility updated for latest IMO load       Disorder of bone and cartilage 09/17/2018     Priority: Medium     Overview:   Created by Conversion  [facility] Annotation: Dec 13 2012  7:41Roberta Kelly: vit D/Ca, f/u   Dexa needed 1/2014.    Replacement Utility updated for latest IMO load       Lower extremity weakness 07/04/2018     Priority: Medium     Orthostatic hypotension dysautonomic syndrome (H) 09/22/2017     Priority: Medium     Primary insomnia 07/04/2017     Priority: Medium     Urinary incontinence in female 05/07/2017     Priority: Medium     Weakness 05/04/2017     Priority: Medium     S/P AAA repair using bifurcation graft 11/30/2015     Priority: Medium     Adrenal adenoma 11/20/2015     Priority: Medium     Overview:   Current hormonal evaluation through endocrinology          Past Surgical History   Past Surgical History:   Procedure Laterality Date     AAA REPAIR  2015    [facility] bifurcation graft     CARPAL TUNNEL RELEASE RT/LT Bilateral 2013     HYSTERECTOMY  2013      JOINT REPLACEMENT Right 2003     LAPAROSCOPIC CHOLECYSTECTOMY N/A 12/12/2018    Procedure: LAPAROSCOPIC CHOLECYSTECTOMY;  Surgeon: Amadeo Sebastian MD;  Location: [facility] OR     ORTHOPEDIC SURGERY       SPINE SURGERY  1981    cervical spine fusion     THYROIDECTOMY  2011        Prior to Admission Medications   Prior to Admission Medications   Prescriptions Last Dose Informant Patient Reported? Taking?   DULoxetine (CYMBALTA) 20 MG capsule   Yes No   Sig: Take 20 mg by mouth daily   GAS RELIEF 125 MG CAPS   No No   Sig: TAKE 1 CAPSULE BY MOUTH FOUR TIMES DAILY AS NEEDED WITH MEALS   HYDROcodone-acetaminophen (NORCO) 5-325 MG tablet   No No   Sig: TAKE 1 TABLET BY MOUTH TWICE DAILY FOR PAIN. (MAX APAP 4GM/24HRS);TAKE 1 TABLET BY MOUTH EVERY 8 HOURS AS NEEDED FOR PAIN 9-10/10 (MAX APAP 4GM/24HRS)   Polyethylene Glycol 3350 (MIRALAX PO)   Yes No   Sig: Take 1 capful by mouth daily   acetaminophen (TYLENOL) 500 MG tablet   Yes No   Sig: Take 500 mg by mouth 3 times daily    bisacodyl (DULCOLAX) 10 MG suppository   Yes No   Sig: Place 10 mg rectally daily as needed for constipation   bisacodyl (DULCOLAX) 5 MG EC tablet   Yes No   Sig: Take 5 mg by mouth daily (and a second time if needed)   calcium carbonate (TUMS) 500 MG chewable tablet  Nursing Home No No   Sig: Take 1 tablet (500 mg) by mouth every 2 hours as needed for heartburn   carbidopa-levodopa (SINEMET)  MG tablet  Nursing Home Yes No   Sig: Take 2 tablets by mouth 3 times daily (0630, 1230, 1830)   clindamycin (CLEOCIN) 300 MG capsule   Yes No   Sig: Take 600 mg by mouth as needed Give 1 hour prior to dental appoitments   clotrimazole (LOTRIMIN) 1 % external cream   Yes No   Sig: Apply topically 2 times daily as needed   diclofenac (VOLTAREN) 1 % topical gel  Nursing Home No No   Sig: Apply 4 g topically 4 times daily as needed for moderate pain (to back and neck)   gabapentin (NEURONTIN) 100 MG capsule   Yes No   Sig: Take 300 mg by mouth 3 times daily     hydrochlorothiazide (HYDRODIURIL) 25 MG tablet   No No   Sig: Take 1 tablet (25 mg) by mouth daily   levothyroxine (SYNTHROID/LEVOTHROID) 100 MCG tablet   No No   Sig: Take 1 tablet (100 mcg) by mouth daily   losartan (COZAAR) 50 MG tablet  Nursing Home No No   Sig: TAKE TABLET BY MOUTH EVERY MORNING   magnesium citrate solution   Yes No   Sig: Take 296 mLs by mouth as needed for other   pantoprazole (PROTONIX) 20 MG EC tablet   Yes No   Sig: Take 20 mg by mouth daily   polyethylene glycol-propylene glycol (SYSTANE ULTRA) 0.4-0.3 % SOLN ophthalmic solution   Yes No   Sig: Place 2 drops into both eyes 3 times daily (and additionally as needed/requested)   vitamin D3 (CHOLECALCIFEROL) 2000 units (50 mcg) tablet   Yes No   Sig: Take 2,000 Units by mouth daily   zinc oxide (DESITIN) 20 % external ointment   Yes No   Sig: Apply topically daily       Facility-Administered Medications: None     Allergies   Allergies   Allergen Reactions     Penicillins Anaphylaxis, Rash and Shortness Of Breath     Aspirin Nausea and GI Disturbance     Atenolol Cough     Atorvastatin Muscle Pain (Myalgia) and Nausea and Vomiting     Bupropion Nausea     Clindamycin Other (See Comments)     Constipation      Codeine Nausea     Ibuprofen Nausea     Stomach upset     Lovastatin Muscle Pain (Myalgia)     Cephalexin Rash     Neck reddness       Family History    Family History   Problem Relation Age of Onset     Hypertension Mother      Coronary Artery Disease Mother      Coronary Artery Disease Father      Other Cancer Brother      Parkinsonism Other        Social History   Social History     Socioeconomic History     Marital status:      Spouse name: Not on file     Number of children: Not on file     Years of education: Not on file     Highest education level: Not on file   Occupational History     Not on file   Social Needs     Financial resource strain: Not on file     Food insecurity     Worry: Not on file     Inability: Not on  "file     Transportation needs     Medical: Not on file     Non-medical: Not on file   Tobacco Use     Smoking status: Former Smoker     Packs/day: 0.50     Types: Cigarettes     Last attempt to quit: 1994     Years since quittin.6     Smokeless tobacco: Never Used   Substance and Sexual Activity     Alcohol use: No     Drug use: No     Sexual activity: Not Currently   Lifestyle     Physical activity     Days per week: Not on file     Minutes per session: Not on file     Stress: Not on file   Relationships     Social connections     Talks on phone: Not on file     Gets together: Not on file     Attends Judaism service: Not on file     Active member of club or organization: Not on file     Attends meetings of clubs or organizations: Not on file     Relationship status: Not on file     Intimate partner violence     Fear of current or ex partner: Not on file     Emotionally abused: Not on file     Physically abused: Not on file     Forced sexual activity: Not on file   Other Topics Concern     Parent/sibling w/ CABG, MI or angioplasty before 65F 55M? Not Asked   Social History Narrative     Not on file       Physical Exam   BP (!) 159/64 (BP Location: Right arm)   Pulse 74   Temp 98  F (36.7  C) (Oral)   Resp 18   Ht 1.676 m (5' 6\")   Wt 71.6 kg (157 lb 13.6 oz)   SpO2 97%   BMI 25.48 kg/m       Weight: 157 lbs 13.59 oz Body mass index is 25.48 kg/m .     Constitutional: Alert, oriented, cooperative, no apparent distress, appears nontoxic, speaking in full sentences, appears stated age.  Eyes: Eyes are clear. No scleral icterus. Extroccular movements intact.   HEENT: Oropharynx is clear and moist. Normocephalic, no evidence of cranial trauma.   Cardiovascular: Regular rhythm and rate, normal S1 and S2. Murmur appreciated. Peripheral pulses intact bilaterally. No lower extremity edema.  Respiratory: Lung sounds are clear to auscultation bilaterally without wheezes, rhonchi, or crackles.  GI: Soft, " non-distended. Mild suprapubic tenderness, no rebound or guarding. Normal bowel sounds.   Musculoskeletal:  Mild diffuse tenderness around knees, bruise on right knee. Moves all extremities. Trace pretibial edema. No midline back tenderness. Moves from side to side in bed.   Skin: Warm and dry. Red rash to buttocks/perineum.   Neurologic: Neck supple. Patient moves all extremities. Cranial nerves are grossly intact.  is symmetric. Gross strength and sensation are equal in bilateral upper and lower extremities.   Genitourinary: Normal external genitalia.       Data   Data reviewed today:   Recent Labs   Lab 08/05/20 2037   WBC 5.0   HGB 14.1   MCV 97         POTASSIUM 4.4   CHLORIDE 105   CO2 35*   BUN 15   CR 0.33*   ANIONGAP <1*   YADIRA 9.0   GLC 89       Recent Results (from the past 24 hour(s))   XR Pelvis 1/2 Views    Narrative    EXAM: XR PELVIS 1/2 VW  LOCATION: [facility]  DATE/TIME: 8/5/2020 6:18 PM    INDICATION: Pelvic pain.  COMPARISON: None.      Impression    IMPRESSION: Right total hip arthroplasty in place without evidence of complication. No evidence of acute fracture or subluxation. Aortoiliac stent identified.   Knee XR, 2 views, bilateral    Narrative    EXAM: XR KNEE BILATERAL 1/2 VW  LOCATION: [facility]  DATE/TIME: 8/5/2020 6:18 PM    INDICATION: Bilateral knee pain.  COMPARISON: Right knee x-ray from 04/06/2020.      Impression    IMPRESSION:   RIGHT KNEE: Mild medial compartment joint space narrowing. No evidence of fracture or effusion.    LEFT KNEE: Total knee arthroplasty in place. No evidence of fracture or loosening.

## 2020-08-06 NOTE — PROGRESS NOTES
"   08/06/20 1100   Quick Adds   Type of Visit Initial Occupational Therapy Evaluation   Living Environment   Lives With alone;facility resident   Living Arrangements assisted living   Home Accessibility no concerns   Self-Care   Equipment Currently Used at Home grab bar, toilet;grab bar, tub/shower;walker, standard   Functional Level   Ambulation 1-->assistive equipment   Transferring 1-->assistive equipment   Toileting 1-->assistive equipment   Bathing 2-->assistive person   Dressing 0-->independent   Eating 0-->independent   Communication 0-->understands/communicates without difficulty   Swallowing 0-->swallows foods/liquids without difficulty   Cognition 0 - no cognition issues reported   Fall history within last six months yes   Number of times patient has fallen within last six months 2   Which of the above functional risks had a recent onset or change? ambulation;transferring;toileting;bathing;dressing   General Information   Onset of Illness/Injury or Date of Surgery - Date 08/05/20   Referring Physician Thelma Menjivar PA-C    Patient/Family Goals Statement in agreement to go to TCU   Additional Occupational Profile Info/Pertinent History of Current Problem 80 year old female who presents on 8/5/2020 following a fall x2 at home with bilateral knee pain and back pain. Patient reports her weakness has been getting worse over the past few months, she feels like she has not been able to do much in terms of activity. Admits to chronic generalized pain including bilateral shoulders, back pain, left ankle pain related to recent Achilles tendon tear, bilateral knee pain and leg pain   Precautions/Limitations fall precautions   Cognitive Status Examination   Orientation orientation to person, place and time   Level of Consciousness alert   Follows Commands (Cognition) WNL   Pain Assessment   Patient Currently in Pain Yes, see Vital Sign flowsheet  (\"everything hurts\")   Strength   Strength Comments feels weaker " "than baseline. B UE strength: not formally assessed, however difficulty pushing up from chair and adjust self in bed.    Mobility   Bed Mobility Comments Mod A for sit to supine.    Transfer Skill: Sit to Stand   Level of [place]: Sit/Stand moderate assist (50% patients effort)   Physical Assist/Nonphysical Assist: Sit/Stand 1 person assist   Assistive Device for Transfer: Sit/Stand standard walker   Transfer Skill: Toilet Transfer   Level of [place]: Toilet minimum assist (75% patients effort)   Physical Assist/Nonphysical Assist: Toilet 1 person assist   Assistive Device standard walker;grab bars   Upper Body Dressing   Level of [place]: Dress Upper Body independent   Lower Body Dressing   Level of [place]: Dress Lower Body moderate assist (50% patients effort)   Physical Assist/Nonphysical Assist: Dress Lower Body 1 person assist   Activities of Daily Living Analysis   Impairments Contributing to Impaired Activities of Daily Living balance impaired;pain;strength decreased   Clinical Impression   Criteria for Skilled Therapeutic Interventions Met evaluation only   OT Diagnosis assess ADLs for safe discharge planning   Influenced by the following impairments weakness, fall    Assessment of Occupational Performance 3-5 Performance Deficits   Identified Performance Deficits dressing, toileting, functional mobilit    Clinical Decision Making (Complexity) Low complexity   Anticipated Discharge Disposition Transitional Care Facility   Risks and Benefits of Treatment have been explained. Yes   Patient, Family & other staff in agreement with plan of care Yes   [facility] AM-PAC  \"6 Clicks\" Daily Activity Inpatient Short Form   1. Putting on and taking off regular lower body clothing? 2 - A Lot   2. Bathing (including washing, rinsing, drying)? 3 - A Little   3. Toileting, which includes using toilet, bedpan or urinal? 3 - A Little   4. Putting on and taking off regular upper body clothing? 4 - " None   5. Taking care of personal grooming such as brushing teeth? 3 - A Little   6. Eating meals? 4 - None   Daily Activity Raw Score (Score out of 24.Lower scores equate to lower levels of function) 19   Total Evaluation Time   Total Evaluation Time (Minutes) 20

## 2020-08-06 NOTE — PROGRESS NOTES
08/06/20 0800   Quick Adds   Type of Visit Initial PT Evaluation   Living Environment   Lives With facility resident   Living Arrangements apartment;assisted living   Functional Level Prior   Ambulation 1-->assistive equipment   Transferring 1-->assistive equipment   Toileting 1-->assistive equipment   Bathing 2-->assistive person   Communication 0-->understands/communicates without difficulty   Swallowing 0-->swallows foods/liquids without difficulty   Cognition 0 - no cognition issues reported   Fall history within last six months yes   Number of times patient has fallen within last six months 2   Which of the above functional risks had a recent onset or change? ambulation;transferring   Prior Functional Level Comment PLOf- Indep ambulation short in room distances in her [facility] apartment using a RW    General Information   Onset of Illness/Injury or Date of Surgery - Date 08/05/20   Referring Physician Otto FREEDMAN   Patient/Family Goals Statement Unsure- but reports she can not return home   Pertinent History of Current Problem (include personal factors and/or comorbidities that impact the POC) 80 year old female who presents on 8/5/2020 following a fall x2 at home with bilateral knee pain and back pain. Patient reports her weakness has been getting worse over the past few months, she feels like she has not been able to do much in terms of activity. Admits to chronic generalized pain including bilateral shoulders, back pain, left ankle pain related to recent Achilles tendon tear, bilateral knee pain and leg pain   Precautions/Limitations fall precautions   Weight-Bearing Status - LLE   (WBAT   General Observations Pt alert, reports she is unsure if she could walk indep at home; c/o knee pain, diffuse pain as she twisted when she fell at home   Multiple complaints  LBP. bilat knee pain, . shoulder pain- stating she wants to die   General Info Comments Pt  reports she needs to wear her shoes at all times due to  "recent L achilles tendon tear; also has  orthotics due to leg length discrepancy  w/ the R leg shorter > the left.   ( wears her shoes at night)    Pain Assessment   Patient Currently in Pain Yes, see Vital Sign flowsheet   Range of Motion (ROM)   ROM Comment brief assessment due to pain - appears to lack full knee ext bilaterally ; c/o  LBP w/ passive knee flexion. R ligamentous testing negative   Strength   Strength Comments deferred due to pain   Bed Mobility   Bed Mobility Comments Min. assistance supine<> sitting.    Transfer Skills   Transfer Comments Performed  x 3 - variable mod to CGA of one -      Gait   Gait Comments Pt amb 15 feet x2 with RW, CGA( shoes) Fair gait pattern. Flexed psoture, keeping knees flexed  decreased step ht/ shuffling gait   Balance   Balance Comments Fair in standing- has difficulty w/ LOB leaning foward-   Clinical Impression   Criteria for Skilled Therapeutic Intervention yes, treatment indicated;evaluation only   PT Diagnosis Impaired gait   Influenced by the following impairments Diffuse pain, decreased balance, appears to have decreased strength    Functional limitations due to impairments decreased mbility- decreased  indep. w/ bed mobility , transfers, unable to amb, indep. FAll risk   Clinical Presentation Stable/Uncomplicated   Clinical Presentation Rationale clinical  judgement   Clinical Decision Making (Complexity) Low complexity   Therapy Frequency Daily   Anticipated Discharge Disposition Transitional Care Facility   Risk & Benefits of therapy have been explained Yes   Patient, Family & other staff in agreement with plan of care Yes   Clinical Impression Comments Pt needing assistance w/ all mobility- would not be  safe to rerun home w/o assistance at all times .   TCU would be benefical, it pt agreeable   [facility] AM-PAC  \"6 Clicks\" V.2 Basic Mobility Inpatient Short Form   1. Turning from your back to your side while in a flat bed without using bedrails? 3 - " A Little   2. Moving from lying on your back to sitting on the side of a flat bed without using bedrails? 3 - A Little   3. Moving to and from a bed to a chair (including a wheelchair)? 3 - A Little   4. Standing up from a chair using your arms (e.g., wheelchair, or bedside chair)? 3 - A Little   5. To walk in hospital room? 3 - A Little   6. Climbing 3-5 steps with a railing? 3 - A Little   Basic Mobility Raw Score (Score out of 24.Lower scores equate to lower levels of function) 18

## 2020-08-06 NOTE — PROGRESS NOTES
CARE TRANSITION SOCIAL WORK INITIAL ASSESSMENT:    Admit date/time:  OBS  08/05/20 2200     Referral received from medical team for the purpose of discharge planning.  WILLIS reviewed pt's EMR and learned that pt resides at [facility] (phone: [phone] Fax: [phone]).    PT/OT recommend TCU cares upon discharge.  MD states pt is medically stable for discharge today.      Met with: WILLIS met with patient and daughter, Eladio, via phone.  WILLIS introduced self, title & role.  Both pt & Eladio are in agreement that pt will need TCU cares & requested [facility] Phone (Main Phone:[phone] Admissions Phone:[phone] Fax: [phone]) as the pt has been there previously.    DATA  Principal Problem:    Fall  Active Problems:    Dyslipidemia    Esophageal reflux    Hypertension    Hypothyroidism    Weakness    Movement disorder    Multiple system atrophy P (H)    Bilateral knee pain    Generalized muscle weakness    Depression    REM sleep behavior disorder       Primary Care Clinic Name: [facility]  Primary Care MD Name: Cathy Villatoro  Contact information and PCP information verified: Yes    ASSESSMENT  Cognitive Status: awake, alert and oriented.       Resources List: Skilled Nursing Facility  Other Resources: Home Care, [facility] ([phone] Fax: [phone])    Description of Support System: Supportive   Who is your support system?: Children   Support Assessment: Adequate family and caregiver support   Insurance Concerns: No Insurance issues identified  Living Arrangements: apartment, assisted living    Willis discussed discharge planning and medicare guidelines in regards to home care and SNF benefits. Pt/family was provided with the Medicare Compare list for SNF, discussed associated star ratings to assist with choice for referrals/discharge planning Yes    Education was given to pt/family that star ratings are updated/maintained by Medicare and can be  reviewed by visiting www.medicare.gov Yes    SW spoke with [facility] admissions & they have agreed to take the pt into their TCU upon discharge.    Transportation was discussed and will be provided by pt's daughter, Eladio.    Care Transitions staff discussed current COVID 19 pandemic and Medicare waiver of the traditional 3 day stay requirement. Referred patient to medicare.gov, insurance provider, and admissions department at accepting facility for further questions or concerns on coverage for SNF stay.    1.  This patient has been evacuated from a nursing home in the emergency area? No  2.  This patient is being discharged from a hospital (in the emergency area) in order to provide care to more seriously ill patients? Yes  3.  This patient needs SNF care as a result of the emergency, regardless of whether he/she was in a hospital/nursing home prior to this stay? No    COVID-19 testing requested by facility prior to accepting patient for admission no  Discharging provider advised yes    PAS-RR  Per DHS regulation, CTS team completed and submitted PAS-RR to [place] Board on Aging Direct Connect via the [employer]. CTS team advised SNF and they are aware a PAS-RR has been submitted.     CTS team reviewed with pt or health care agent that they may be contacted for a follow up appointment within 10 days of hospital discharge if SNF stay is <30 days. Contact information for [employer] was also provided.     Pt or health care agent verbalized understanding.     PAS-RR # [id number]      PLAN:  Pt to discharge to [place] today at 3pm via family.    NAM Fields  [facility] [phone]   [facility]  [phone]

## 2020-08-07 ENCOUNTER — VIRTUAL VISIT (OUTPATIENT)
Dept: GERIATRICS | Facility: CLINIC | Age: 80
End: 2020-08-07
Payer: COMMERCIAL

## 2020-08-07 VITALS
OXYGEN SATURATION: 96 % | RESPIRATION RATE: 16 BRPM | BODY MASS INDEX: 24.53 KG/M2 | HEART RATE: 74 BPM | SYSTOLIC BLOOD PRESSURE: 147 MMHG | WEIGHT: 152 LBS | TEMPERATURE: 98.3 F | DIASTOLIC BLOOD PRESSURE: 83 MMHG

## 2020-08-07 DIAGNOSIS — G23.8 MULTIPLE SYSTEM ATROPHY (H): ICD-10-CM

## 2020-08-07 DIAGNOSIS — F41.1 GAD (GENERALIZED ANXIETY DISORDER): ICD-10-CM

## 2020-08-07 DIAGNOSIS — M15.0 PRIMARY OSTEOARTHRITIS INVOLVING MULTIPLE JOINTS: ICD-10-CM

## 2020-08-07 DIAGNOSIS — G25.9 MOVEMENT DISORDER: ICD-10-CM

## 2020-08-07 DIAGNOSIS — G90.3 MULTIPLE SYSTEM ATROPHY (H): ICD-10-CM

## 2020-08-07 DIAGNOSIS — Z79.899 POLYPHARMACY: ICD-10-CM

## 2020-08-07 DIAGNOSIS — M62.81 GENERALIZED MUSCLE WEAKNESS: Primary | ICD-10-CM

## 2020-08-07 DIAGNOSIS — R41.89 COGNITIVE IMPAIRMENT: ICD-10-CM

## 2020-08-07 PROCEDURE — 99305 1ST NF CARE MODERATE MDM 35: CPT | Mod: GT | Performed by: NURSE PRACTITIONER

## 2020-08-07 NOTE — PROGRESS NOTES
"[facility] Geriatrics Video Visit  Mehreen Camara is a 80 year old female who is being evaluated via a billable video visit due to the restrictions of the COVID19 pandemic.The patient has been notified of following: \"This video visit will be conducted via a call between you and your provider. We have found that certain health care needs can be provided without the need for an in-person physical exam.  This service lets us provide the care you need with a video conversation.  If a prescription is necessary we can send it directly to your pharmacy.  If lab work is needed we can place an order for that and you can then stop by our lab to have the test done at a later time. If during the course of the call the provider feels a video visit is not appropriate, you will not be charged for this service.\"     Proivder has received verbal consent for a Video Visit from the patient? Yes    Patient/facility would like the video invitation sent by: Send to e-mail at: [email or web address]    This visit took place virtually, with the patient located at [facility].  ________________________________________________  Video Start Time: 1145  Chief Complaint   Patient presents with     Video Visit     Admission   [facility] Medical Record Number:  [id number]. Mehreen Camara  is a 80 year old  (1940), admitted to the above facility from [facility]. Admitted to this facility for  rehab, medical management and nursing care. HPI information obtained from: facility chart records, facility staff, patient report, [facility] chart review and Care Everywhere [facility] chart review.     Brief Summary of Hospital Course: Mehreen presented here from [facility] secondary to weakness and request for therapies. She had several falls at [facility] and was having trouble with transfers/ambulation. She presented to the ED after fall #2, and it was determined she would need rehab. 3-day hospital stay is waived at " this time.    Updates since admission to transitional care unit: Mehreen came to TCU on 8/6 s/p ED visit. She was tested as COVID negative prior to her arrival. She states that she always has pain and that it is never controlled. She becomes tangential with her speaking and she is unable to stay focused in conversation. She eventually states that her knees hurt from one of the falls and how she landed. She denies headaches, dizziness. She denies CP, SOB, constipation/diarrhea. She denies nausea/heartburn. She denies fevers. Review of XRs/diagnostics from ED shows no acute fractures or metabolic cause.      CODE STATUS/ADVANCE DIRECTIVES DISCUSSION: DNR/DNI.    Patient's living condition: lives in an assisted living facilityALLERGIES: Penicillins; Aspirin; Atenolol; Atorvastatin; Bupropion; Clindamycin; Codeine; Ibuprofen; Lovastatin; and CephalexinPAST MEDICAL HISTORY:  has a past medical history of Adrenal adenoma, Arthritis, Depressive disorder, Hypertension, Multiple system atrophy P (H), Rheumatic fever, Small bowel obstruction (H), and Thyroid disease.PAST SURGICAL HISTORY:   has a past surgical history that includes orthopedic surgery; Thyroidectomy (2011); joint replacement (Right, 2003); Hysterectomy (2013); carpal tunnel release rt/lt (Bilateral, 2013); Spine surgery (1981); aaa repair (2015); and Laparoscopic cholecystectomy (N/A, 12/12/2018).FAMILY HISTORY: family history includes Coronary Artery Disease in her father and mother; Hypertension in her mother; Other Cancer in her brother; Parkinsonism in an other family member.SOCIAL HISTORY:   reports that she quit smoking about 25 years ago. Her smoking use included cigarettes. She smoked 0.50 packs per day. She has never used smokeless tobacco. She reports that she does not drink alcohol or use drugs.  Post Discharge Medication Reconciliation Status: discharge medications reconciled and changed, per note/orders  Current Outpatient Medications   Medication  Sig Dispense Refill     acetaminophen (TYLENOL) 500 MG tablet Take 500 mg by mouth 3 times daily        bisacodyl (DULCOLAX) 10 MG suppository Place 10 mg rectally daily as needed for constipation       bisacodyl (DULCOLAX) 5 MG EC tablet Take 5 mg by mouth daily (and a second time if needed)       calcium carbonate (TUMS) 500 MG chewable tablet Take 1 tablet (500 mg) by mouth every 2 hours as needed for heartburn 150 tablet 1     carbidopa-levodopa (SINEMET)  MG tablet Take 2 tablets by mouth 3 times daily (0630, 1230, 1830)       clindamycin (CLEOCIN) 300 MG capsule Take 600 mg by mouth as needed Give 1 hour prior to dental appoitments       clotrimazole (LOTRIMIN) 1 % external cream Apply topically 2 times daily as needed       diclofenac (VOLTAREN) 1 % topical gel Apply 4 g topically 4 times daily as needed for moderate pain (to back and neck) 100 g 11     DULoxetine (CYMBALTA) 20 MG capsule Take 20 mg by mouth daily       gabapentin (NEURONTIN) 100 MG capsule Take 300 mg by mouth 3 times daily        GAS RELIEF 125 MG CAPS TAKE 1 CAPSULE BY MOUTH FOUR TIMES DAILY AS NEEDED WITH MEALS 30 capsule 11     hydrochlorothiazide (HYDRODIURIL) 25 MG tablet Take 1 tablet (25 mg) by mouth daily       HYDROcodone-acetaminophen (NORCO) 5-325 MG tablet Take 1 tablet by mouth every 8 hours as needed for severe pain 10 tablet 0     HYDROcodone-acetaminophen (NORCO) 5-325 MG tablet TAKE 1 TABLET BY MOUTH TWICE DAILY FOR PAIN. (MAX APAP 4GM/24HRS);TAKE 1 TABLET BY MOUTH EVERY 8 HOURS AS NEEDED FOR PAIN 9-10/10 (MAX APAP 4GM/24HRS) 120 tablet 0     levothyroxine (SYNTHROID/LEVOTHROID) 100 MCG tablet Take 1 tablet (100 mcg) by mouth daily       losartan (COZAAR) 50 MG tablet TAKE TABLET BY MOUTH EVERY MORNING 30 tablet 11     magnesium citrate solution Take 296 mLs by mouth as needed for other       pantoprazole (PROTONIX) 20 MG EC tablet Take 20 mg by mouth daily       Polyethylene Glycol 3350 (MIRALAX PO) Take 1 capful by  mouth daily       polyethylene glycol-propylene glycol (SYSTANE ULTRA) 0.4-0.3 % SOLN ophthalmic solution Place 2 drops into both eyes 3 times daily (and additionally as needed/requested)       vitamin D3 (CHOLECALCIFEROL) 2000 units (50 mcg) tablet Take 2,000 Units by mouth daily       Zinc Oxide 13 % CREA Apply topically daily        ROS: Limited secondary to cognitive impairment but today pt reports the above and 10 point ROS including Respiratory, CV, GI and , other than that noted in the HPI, is negative.    Objective:  BP (!) 147/83   Pulse 74   Temp 98.3  F (36.8  C)   Resp 16   Wt 68.9 kg (152 lb)   SpO2 96%   BMI 24.53 kg/m    GENERAL APPEARANCE: Alert, in no distress, cooperative.   EYES/ENT: EOM normal on camera, hearing acuity [place].  RESP: Respiratory effort appears unlabored over video screen, no respiratory distress apparent on video, On RA.   CV: Edema appears trace BLE via video. Color appears pale.  ABDOMEN: Appears non-distended.  SKIN: Skin appears baseline w/ video inspection. No wounds/rashes noted.    NEURO: CN II-XII at patient's baseline, MARMOLEJO at baseline on video. Sensation baseline PPS.  PSYCH: Insight, judgement, and memory are baseline, affect and mood are tangential/irritable.    Laboratory/Diagnostics:  CBC RESULTS:   Recent Labs   Lab Test 08/06/20  0712 08/05/20 2037   WBC 5.1 5.0   RBC 4.38 4.43   HGB 14.0 14.1   HCT 42.7 42.9   MCV 98 97   MCH 32.0 31.8   MCHC 32.8 32.9   RDW 11.4 11.4    193   Last Basic Metabolic Panel:  Recent Labs   Lab Test 08/06/20  0712 08/05/20 2037    140   POTASSIUM 3.4 4.4   CHLORIDE 103 105   YADIRA 8.8 9.0   CO2 36* 35*   BUN 13 15   CR 0.51* 0.33*   * 89   Liver Function Studies -   Recent Labs   Lab Test 01/27/20  1722 12/28/19  1408   PROTTOTAL 7.5 7.4   ALBUMIN 3.0* 3.6   BILITOTAL 0.4 0.7   ALKPHOS 73 64   AST 21 24   ALT 16 21     Assessment/Plan:  Generalized muscle weakness  Cognitive impairment  VAMSHI (generalized anxiety  disorder)   Primary osteoarthritis involving multiple joints  Multiple system atrophy (H)  Polypharmacy  Movement disorder  Acute-on-chronic. Tenuous. Suspect progression of PD/movement disorder vs dementia.    Polypharmacy likely contributing to condition/behavior. We will clarify the many issues below with her medications from ED.     We note prior to ED visit she was NOT on Zoloft, because she had been switched to Cymbalta. We will clarify this with nursing.     We note ED sent incomplete medication orders, many medications were clarified (see below).     We note her confusion, tangential thinking, and previous cognitive scores indicating dementia. We will consult in-house psych along w/ rehabilitation services for further eval.    Given her OA and fall, we will have nursing offer ice TID PRN to her knees. This pain should improve over some time.     She c/o constipation, and would like her bowel medications available. We will alter her Miralax order to reflect what she does at home.     We will continue to evaluate her for metabolic cause of weakness.  Follow-up w/in 1 week or as needed.     Orders:  1. CBC, CMP x1 on 8/10. Dx: weakness.  2. In-house psych eval & treat x1. Dx: VAMSHI.  3. Ice to knees TID PRN. Dx: OA.   4. Miralax 17G PO every day and every day PRN. Dx: constipation.  5. Discontinue Zoloft.    Clarifications given on the following orders:    Gabapentin    Vitamin D    Eye drops    Protonix    Tylenol    Bisacodyl    Cymbalta    Total time spent with patient visit was 35 min including patient visit and review of past records. Greater than 50% of total time spent with counseling and coordinating care due to intense polypharmacy and medication issues noted above.     Video-Visit Details  Type of service:  Video Visit  Video Start Time: 1145  Video End Time (time video stopped): 1154  Originating Location (pt. Location): [facility]  Distant Location (provider location):  [facility]   Mode of  Communication:  Video Conference.    Electronically signed by:  Dr. Lisa Parry, APRN CNP DNP

## 2020-08-07 NOTE — LETTER
"    8/7/2020        RE: Mehreen Camara  [facility]  [address]  [facility] [address]        [facility]iatric[facility] Video Visit  Mehreen Camara is a 80 year old female who is being evaluated via a billable video visit due to the restrictions of the COVID19 pandemic.The patient has been notified of following: \"This video visit will be conducted via a call between you and your provider. We have found that certain health care needs can be provided without the need for an in-person physical exam.  This service lets us provide the care you need with a video conversation.  If a prescription is necessary we can send it directly to your pharmacy.  If lab work is needed we can place an order for that and you can then stop by our lab to have the test done at a later time. If during the course of the call the provider feels a video visit is not appropriate, you will not be charged for this service.\"     Proivder has received verbal consent for a Video Visit from the patient? Yes    Patient/facility would like the video invitation sent by: Send to e-mail at: [email or web address]    This visit took place virtually, with the patient located at [facility].  ________________________________________________  Video Start Time: 1145  Chief Complaint   Patient presents with     Video Visit     Admission   [facility] Record Number:  [id number]. Mehreen Camara  is a 80 year old  (1940), admitted to the above facility from [facility]. Admitted to this facility for  rehab, medical management and nursing care. HPI information obtained from: facility chart records, facility staff, patient report, [facility] chart review and Care Everywhere [facility] chart review.     Brief Summary of Hospital Course: Mehreen presented here from [facility] secondary to weakness and request for therapies. She had several falls at [facility] and was having trouble with transfers/ambulation. " She presented to the ED after fall #2, and it was determined she would need rehab. 3-day hospital stay is waived at this time.    Updates since admission to transitional care unit: Mehreen came to TCU on 8/6 s/p ED visit. She was tested as COVID negative prior to her arrival. She states that she always has pain and that it is never controlled. She becomes tangential with her speaking and she is unable to stay focused in conversation. She eventually states that her knees hurt from one of the falls and how she landed. She denies headaches, dizziness. She denies CP, SOB, constipation/diarrhea. She denies nausea/heartburn. She denies fevers. Review of XRs/diagnostics from ED shows no acute fractures or metabolic cause.      CODE STATUS/ADVANCE DIRECTIVES DISCUSSION: DNR/DNI.    Patient's living condition: lives in an assisted living facilityALLERGIES: Penicillins; Aspirin; Atenolol; Atorvastatin; Bupropion; Clindamycin; Codeine; Ibuprofen; Lovastatin; and CephalexinPAST MEDICAL HISTORY:  has a past medical history of Adrenal adenoma, Arthritis, Depressive disorder, Hypertension, Multiple system atrophy P (H), Rheumatic fever, Small bowel obstruction (H), and Thyroid disease.PAST SURGICAL HISTORY:   has a past surgical history that includes orthopedic surgery; Thyroidectomy (2011); joint replacement (Right, 2003); Hysterectomy (2013); carpal tunnel release rt/lt (Bilateral, 2013); Spine surgery (1981); aaa repair (2015); and Laparoscopic cholecystectomy (N/A, 12/12/2018).FAMILY HISTORY: family history includes Coronary Artery Disease in her father and mother; Hypertension in her mother; Other Cancer in her brother; Parkinsonism in an other family member.SOCIAL HISTORY:   reports that she quit smoking about 25 years ago. Her smoking use included cigarettes. She smoked 0.50 packs per day. She has never used smokeless tobacco. She reports that she does not drink alcohol or use drugs.  Post Discharge Medication Reconciliation  Status: discharge medications reconciled and changed, per note/orders  Current Outpatient Medications   Medication Sig Dispense Refill     acetaminophen (TYLENOL) 500 MG tablet Take 500 mg by mouth 3 times daily        bisacodyl (DULCOLAX) 10 MG suppository Place 10 mg rectally daily as needed for constipation       bisacodyl (DULCOLAX) 5 MG EC tablet Take 5 mg by mouth daily (and a second time if needed)       calcium carbonate (TUMS) 500 MG chewable tablet Take 1 tablet (500 mg) by mouth every 2 hours as needed for heartburn 150 tablet 1     carbidopa-levodopa (SINEMET)  MG tablet Take 2 tablets by mouth 3 times daily (0630, 1230, 1830)       clindamycin (CLEOCIN) 300 MG capsule Take 600 mg by mouth as needed Give 1 hour prior to dental appoitments       clotrimazole (LOTRIMIN) 1 % external cream Apply topically 2 times daily as needed       diclofenac (VOLTAREN) 1 % topical gel Apply 4 g topically 4 times daily as needed for moderate pain (to back and neck) 100 g 11     DULoxetine (CYMBALTA) 20 MG capsule Take 20 mg by mouth daily       gabapentin (NEURONTIN) 100 MG capsule Take 300 mg by mouth 3 times daily        GAS RELIEF 125 MG CAPS TAKE 1 CAPSULE BY MOUTH FOUR TIMES DAILY AS NEEDED WITH MEALS 30 capsule 11     hydrochlorothiazide (HYDRODIURIL) 25 MG tablet Take 1 tablet (25 mg) by mouth daily       HYDROcodone-acetaminophen (NORCO) 5-325 MG tablet Take 1 tablet by mouth every 8 hours as needed for severe pain 10 tablet 0     HYDROcodone-acetaminophen (NORCO) 5-325 MG tablet TAKE 1 TABLET BY MOUTH TWICE DAILY FOR PAIN. (MAX APAP 4GM/24HRS);TAKE 1 TABLET BY MOUTH EVERY 8 HOURS AS NEEDED FOR PAIN 9-10/10 (MAX APAP 4GM/24HRS) 120 tablet 0     levothyroxine (SYNTHROID/LEVOTHROID) 100 MCG tablet Take 1 tablet (100 mcg) by mouth daily       losartan (COZAAR) 50 MG tablet TAKE TABLET BY MOUTH EVERY MORNING 30 tablet 11     magnesium citrate solution Take 296 mLs by mouth as needed for other       pantoprazole  (PROTONIX) 20 MG EC tablet Take 20 mg by mouth daily       Polyethylene Glycol 3350 (MIRALAX PO) Take 1 capful by mouth daily       polyethylene glycol-propylene glycol (SYSTANE ULTRA) 0.4-0.3 % SOLN ophthalmic solution Place 2 drops into both eyes 3 times daily (and additionally as needed/requested)       vitamin D3 (CHOLECALCIFEROL) 2000 units (50 mcg) tablet Take 2,000 Units by mouth daily       Zinc Oxide 13 % CREA Apply topically daily        ROS: Limited secondary to cognitive impairment but today pt reports the above and 10 point ROS including Respiratory, CV, GI and , other than that noted in the HPI, is negative.    Objective:  BP (!) 147/83   Pulse 74   Temp 98.3  F (36.8  C)   Resp 16   Wt 68.9 kg (152 lb)   SpO2 96%   BMI 24.53 kg/m    GENERAL APPEARANCE: Alert, in no distress, cooperative.   EYES/ENT: EOM normal on camera, hearing acuity [place].  RESP: Respiratory effort appears unlabored over video screen, no respiratory distress apparent on video, On RA.   CV: Edema appears trace BLE via video. Color appears pale.  ABDOMEN: Appears non-distended.  SKIN: Skin appears baseline w/ video inspection. No wounds/rashes noted.    NEURO: CN II-XII at patient's baseline, MARMOLEJO at baseline on video. Sensation baseline PPS.  PSYCH: Insight, judgement, and memory are baseline, affect and mood are tangential/irritable.    Laboratory/Diagnostics:  CBC RESULTS:   Recent Labs   Lab Test 08/06/20  0712 08/05/20 2037   WBC 5.1 5.0   RBC 4.38 4.43   HGB 14.0 14.1   HCT 42.7 42.9   MCV 98 97   MCH 32.0 31.8   MCHC 32.8 32.9   RDW 11.4 11.4    193   Last Basic Metabolic Panel:  Recent Labs   Lab Test 08/06/20  0712 08/05/20 2037    140   POTASSIUM 3.4 4.4   CHLORIDE 103 105   YADIRA 8.8 9.0   CO2 36* 35*   BUN 13 15   CR 0.51* 0.33*   * 89   Liver Function Studies -   Recent Labs   Lab Test 01/27/20  1722 12/28/19  1408   PROTTOTAL 7.5 7.4   ALBUMIN 3.0* 3.6   BILITOTAL 0.4 0.7   ALKPHOS 73 64   AST  21 24   ALT 16 21     Assessment/Plan:  Generalized muscle weakness  Cognitive impairment  VAMSHI (generalized anxiety disorder)   Primary osteoarthritis involving multiple joints  Multiple system atrophy (H)  Polypharmacy  Movement disorder  Acute-on-chronic. Tenuous. Suspect progression of PD/movement disorder vs dementia.    Polypharmacy likely contributing to condition/behavior. We will clarify the many issues below with her medications from ED.     We note prior to ED visit she was NOT on Zoloft, because she had been switched to Cymbalta. We will clarify this with nursing.     We note ED sent incomplete medication orders, many medications were clarified (see below).     We note her confusion, tangential thinking, and previous cognitive scores indicating dementia. We will consult in-house psych along w/ rehabilitation services for further eval.    Given her OA and fall, we will have nursing offer ice TID PRN to her knees. This pain should improve over some time.     She c/o constipation, and would like her bowel medications available. We will alter her Miralax order to reflect what she does at home.     We will continue to evaluate her for metabolic cause of weakness.  Follow-up w/in 1 week or as needed.     Orders:  1. CBC, CMP x1 on 8/10. Dx: weakness.  2. In-house psych eval & treat x1. Dx: VAMSHI.  3. Ice to knees TID PRN. Dx: OA.   4. Miralax 17G PO every day and every day PRN. Dx: constipation.  5. Discontinue Zoloft.    Clarifications given on the following orders:    Gabapentin    Vitamin D    Eye drops    Protonix    Tylenol    Bisacodyl    Cymbalta    Total time spent with patient visit was 35 min including patient visit and review of past records. Greater than 50% of total time spent with counseling and coordinating care due to intense polypharmacy and medication issues noted above.     Video-Visit Details  Type of service:  Video Visit  Video Start Time: 1145  Video End Time (time video stopped):  1154  Originating Location (pt. Location): TCU  Distant Location (provider location):  [facility]   Mode of Communication:  Video Conference.    Electronically signed by:  HAILEE Gilbert CNP DNP          Sincerely,        HAILEE Langley CNP

## 2020-08-08 ENCOUNTER — TELEPHONE (OUTPATIENT)
Dept: GERIATRICS | Facility: CLINIC | Age: 80
End: 2020-08-08

## 2020-08-08 ENCOUNTER — RECORDS - HEALTHEAST (OUTPATIENT)
Dept: LAB | Facility: CLINIC | Age: 80
End: 2020-08-08
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
## 2020-08-09 ENCOUNTER — RECORDS - HEALTHEAST (OUTPATIENT)
Dept: LAB | Facility: CLINIC | Age: 80
End: 2020-08-09

## 2020-08-09 LAB
ALBUMIN UR-MCNC: NEGATIVE MG/DL
AMORPH CRY #/AREA URNS HPF: ABNORMAL /[HPF]
APPEARANCE UR: ABNORMAL
BACTERIA #/AREA URNS HPF: ABNORMAL HPF
BASOPHILS # BLD AUTO: 0 THOU/UL (ref 0–0.2)
BASOPHILS NFR BLD AUTO: 1 % (ref 0–2)
BILIRUB UR QL STRIP: NEGATIVE
COLOR UR AUTO: YELLOW
EOSINOPHIL # BLD AUTO: 0.1 THOU/UL (ref 0–0.4)
EOSINOPHIL NFR BLD AUTO: 1 % (ref 0–6)
ERYTHROCYTE [DISTWIDTH] IN BLOOD BY AUTOMATED COUNT: 11.8 % (ref 11–14.5)
GLUCOSE UR STRIP-MCNC: NEGATIVE MG/DL
HCT VFR BLD AUTO: 42.2 % (ref 35–47)
HGB BLD-MCNC: 13.5 G/DL (ref 12–16)
HGB UR QL STRIP: NEGATIVE
KETONES UR STRIP-MCNC: NEGATIVE MG/DL
LEUKOCYTE ESTERASE UR QL STRIP: ABNORMAL
LYMPHOCYTES # BLD AUTO: 1.1 THOU/UL (ref 0.8–4.4)
LYMPHOCYTES NFR BLD AUTO: 23 % (ref 20–40)
MCH RBC QN AUTO: 31.4 PG (ref 27–34)
MCHC RBC AUTO-ENTMCNC: 32 G/DL (ref 32–36)
MCV RBC AUTO: 98 FL (ref 80–100)
MONOCYTES # BLD AUTO: 0.3 THOU/UL (ref 0–0.9)
MONOCYTES NFR BLD AUTO: 6 % (ref 2–10)
NEUTROPHILS # BLD AUTO: 3.4 THOU/UL (ref 2–7.7)
NEUTROPHILS NFR BLD AUTO: 70 % (ref 50–70)
NITRATE UR QL: NEGATIVE
PH UR STRIP: 7 [PH] (ref 4.5–8)
PLATELET # BLD AUTO: 224 THOU/UL (ref 140–440)
PMV BLD AUTO: 11.2 FL (ref 8.5–12.5)
RBC # BLD AUTO: 4.3 MILL/UL (ref 3.8–5.4)
RBC #/AREA URNS AUTO: ABNORMAL HPF
SP GR UR STRIP: 1.01 (ref 1–1.03)
SQUAMOUS #/AREA URNS AUTO: ABNORMAL LPF
UROBILINOGEN UR STRIP-ACNC: ABNORMAL
WBC #/AREA URNS AUTO: ABNORMAL HPF
WBC: 4.8 THOU/UL (ref 4–11)

## 2020-08-10 LAB
ALBUMIN SERPL-MCNC: 3.5 G/DL (ref 3.5–5)
ALP SERPL-CCNC: 75 U/L (ref 45–120)
ALT SERPL W P-5'-P-CCNC: <9 U/L (ref 0–45)
ANION GAP SERPL CALCULATED.3IONS-SCNC: 11 MMOL/L (ref 5–18)
AST SERPL W P-5'-P-CCNC: 12 U/L (ref 0–40)
BASOPHILS # BLD AUTO: 0.1 THOU/UL (ref 0–0.2)
BASOPHILS NFR BLD AUTO: 1 % (ref 0–2)
BILIRUB SERPL-MCNC: 0.4 MG/DL (ref 0–1)
BUN SERPL-MCNC: 19 MG/DL (ref 8–28)
CALCIUM SERPL-MCNC: 9.1 MG/DL (ref 8.5–10.5)
CHLORIDE BLD-SCNC: 101 MMOL/L (ref 98–107)
CO2 SERPL-SCNC: 29 MMOL/L (ref 22–31)
CREAT SERPL-MCNC: 0.62 MG/DL (ref 0.6–1.1)
EOSINOPHIL # BLD AUTO: 0.1 THOU/UL (ref 0–0.4)
EOSINOPHIL NFR BLD AUTO: 2 % (ref 0–6)
ERYTHROCYTE [DISTWIDTH] IN BLOOD BY AUTOMATED COUNT: 11.8 % (ref 11–14.5)
GFR SERPL CREATININE-BSD FRML MDRD: >60 ML/MIN/1.73M2
GLUCOSE BLD-MCNC: 86 MG/DL (ref 70–125)
HCT VFR BLD AUTO: 42.9 % (ref 35–47)
HGB BLD-MCNC: 13.8 G/DL (ref 12–16)
LYMPHOCYTES # BLD AUTO: 1.7 THOU/UL (ref 0.8–4.4)
LYMPHOCYTES NFR BLD AUTO: 34 % (ref 20–40)
MCH RBC QN AUTO: 31.2 PG (ref 27–34)
MCHC RBC AUTO-ENTMCNC: 32.2 G/DL (ref 32–36)
MCV RBC AUTO: 97 FL (ref 80–100)
MONOCYTES # BLD AUTO: 0.4 THOU/UL (ref 0–0.9)
MONOCYTES NFR BLD AUTO: 8 % (ref 2–10)
NEUTROPHILS # BLD AUTO: 2.7 THOU/UL (ref 2–7.7)
NEUTROPHILS NFR BLD AUTO: 56 % (ref 50–70)
PLATELET # BLD AUTO: 207 THOU/UL (ref 140–440)
PMV BLD AUTO: 11.2 FL (ref 8.5–12.5)
POTASSIUM BLD-SCNC: 3.9 MMOL/L (ref 3.5–5)
PROT SERPL-MCNC: 7 G/DL (ref 6–8)
RBC # BLD AUTO: 4.42 MILL/UL (ref 3.8–5.4)
SODIUM SERPL-SCNC: 141 MMOL/L (ref 136–145)
WBC: 4.9 THOU/UL (ref 4–11)

## 2020-08-11 ENCOUNTER — VIRTUAL VISIT (OUTPATIENT)
Dept: GERIATRICS | Facility: CLINIC | Age: 80
End: 2020-08-11
Payer: COMMERCIAL

## 2020-08-11 VITALS
BODY MASS INDEX: 24.79 KG/M2 | WEIGHT: 153.6 LBS | DIASTOLIC BLOOD PRESSURE: 80 MMHG | RESPIRATION RATE: 16 BRPM | HEART RATE: 64 BPM | OXYGEN SATURATION: 94 % | SYSTOLIC BLOOD PRESSURE: 128 MMHG | TEMPERATURE: 98 F

## 2020-08-11 DIAGNOSIS — M54.41 ACUTE BILATERAL LOW BACK PAIN WITH BILATERAL SCIATICA: ICD-10-CM

## 2020-08-11 DIAGNOSIS — M62.81 GENERALIZED MUSCLE WEAKNESS: ICD-10-CM

## 2020-08-11 DIAGNOSIS — Z79.899 POLYPHARMACY: ICD-10-CM

## 2020-08-11 DIAGNOSIS — G25.9 MOVEMENT DISORDER: ICD-10-CM

## 2020-08-11 DIAGNOSIS — G23.8 MULTIPLE SYSTEM ATROPHY (H): ICD-10-CM

## 2020-08-11 DIAGNOSIS — M54.42 ACUTE BILATERAL LOW BACK PAIN WITH BILATERAL SCIATICA: ICD-10-CM

## 2020-08-11 DIAGNOSIS — M15.0 PRIMARY OSTEOARTHRITIS INVOLVING MULTIPLE JOINTS: ICD-10-CM

## 2020-08-11 DIAGNOSIS — F41.1 GAD (GENERALIZED ANXIETY DISORDER): Primary | ICD-10-CM

## 2020-08-11 DIAGNOSIS — G90.3 MULTIPLE SYSTEM ATROPHY (H): ICD-10-CM

## 2020-08-11 DIAGNOSIS — R41.89 COGNITIVE IMPAIRMENT: ICD-10-CM

## 2020-08-11 LAB — BACTERIA SPEC CULT: NORMAL

## 2020-08-11 PROCEDURE — 99309 SBSQ NF CARE MODERATE MDM 30: CPT | Mod: GT | Performed by: NURSE PRACTITIONER

## 2020-08-11 RX ORDER — HYDROCODONE BITARTRATE AND ACETAMINOPHEN 5; 325 MG/1; MG/1
TABLET ORAL
Qty: 45 TABLET | Refills: 0 | Status: SHIPPED | OUTPATIENT
Start: 2020-08-11 | End: 2020-08-25

## 2020-08-11 NOTE — PROGRESS NOTES
"[facility] Video Visit  Mehreen Camara is a 80 year old female who is being evaluated via a billable video visit due to the restrictions of the COVID19 pandemic.The patient has been notified of following: \"This video visit will be conducted via a call between you and your provider. We have found that certain health care needs can be provided without the need for an in-person physical exam.  This service lets us provide the care you need with a video conversation.  If a prescription is necessary we can send it directly to your pharmacy.  If lab work is needed we can place an order for that and you can then stop by our lab to have the test done at a later time. If during the course of the call the provider feels a video visit is not appropriate, you will not be charged for this service.\"     Proivder has received verbal consent for a Video Visit from the patient? Yes    Patient/facility would like the video invitation sent by: Send to e-mail at: [email or web address]    This visit took place virtually, with the patient located at [facility].  ________________________________________________  Video Start Time: 1005  Mehreen Camara complains of    Chief Complaint   Patient presents with     Nursing Home Acute   HPI/Subjective: Mehreen seen today resting in her recliner. She states that she has pain, but it gets better with interventions. We note some baseline anxiety and an underlying relationship with her pain. We have consulted in-house psych to assist in this. Over the weekend, she had behaviors and on-call was contacted. This is likely secondary to her receiving 2 Zoloft doses, when Zoloft should have in-fact be discontinued (an error that was found by provider upon admission). Given Mehreen's relationship with pain and anxiety, we asked her about titration of this drug, which she is willing to try. She is working hard in therapy. She denies constipation/diarrhea, " SOB, dizziness, headache. Chart review shows her VS are stable.     History: I have reviewed and updated the patient's Past Medical History, Social History, Family History and Medication List.  ALLERGIES: Penicillins; Aspirin; Atenolol; Atorvastatin; Bupropion; Clindamycin; Codeine; Ibuprofen; Lovastatin; and Cephalexin  REVIEW OF SYSTEMS: Limited secondary to cognitive impairment but today pt reports the above and 4 point ROS including Respiratory, CV, GI and , other than that noted in the HPI, is negative.    Objective:  /80   Pulse 64   Temp 98  F (36.7  C)   Resp 16   Wt 69.7 kg (153 lb 9.6 oz)   SpO2 94%   BMI 24.79 kg/m    GENERAL APPEARANCE: Alert, in no distress, cooperative.   EYES/ENT: EOM normal on camera, hearing acuity [place].  RESP: Respiratory effort appears unlabored over video screen, no respiratory distress apparent on video, On RA.   CV: Edema appears 1+ BLE via video. Color appears pale.  ABDOMEN: Appears non-distended.  SKIN: Skin appears baseline w/ video inspection. No wounds/rashes noted.    NEURO: CN II-XII at patient's baseline, MARMOLEJO at baseline on video. Sensation baseline PPS.  PSYCH: Insight, judgement, and memory are baseline impaired, affect and mood are labile/engaged.    Laboratory/Diagnostics: Reviewed in Epic by provider today.     Assessment/Plan:  Acute bilateral low back pain with bilateral sciatica  VAMSHI (generalized anxiety disorder)  Primary osteoarthritis involving multiple joints  Generalized muscle weakness  Multiple system atrophy (H)  Movement disorder  Polypharmacy  Cognitive impairment  Acute-on-chronic. We will begin cognitive tests, continue w/ in-house psych, and increase Cymbalta. Goal to to reach max on Cymbalta (for older adults) to control both pain and anxiety. Multidisciplinary approach w/ PT/OT is ongoing. Will follow-up w/in 1 week.     Orders:  1. Increase Cymbalta from 20mg to 30mg PO QD. Dx: VAMSHI/depression.    Video-Visit Details  Type of  service:  Video Visit  Video Start Time: 1005  Video End Time (time video stopped): 1013  Originating Location (pt. Location): TCU  Distant Location (provider location):  [facility] TRANSITIONAL CARE   Mode of Communication:  Video Conference.    Electronically signed by:  Dr. Lisa Parry, APRN CNP DNP

## 2020-08-11 NOTE — LETTER
"    8/11/2020        RE: Mehreen Camara  [facility]  [address]  [facility] [address]        [facility] Video Visit  Mehreen Camara is a 80 year old female who is being evaluated via a billable video visit due to the restrictions of the COVID19 pandemic.The patient has been notified of following: \"This video visit will be conducted via a call between you and your provider. We have found that certain health care needs can be provided without the need for an in-person physical exam.  This service lets us provide the care you need with a video conversation.  If a prescription is necessary we can send it directly to your pharmacy.  If lab work is needed we can place an order for that and you can then stop by our lab to have the test done at a later time. If during the course of the call the provider feels a video visit is not appropriate, you will not be charged for this service.\"     Proivder has received verbal consent for a Video Visit from the patient? Yes    Patient/facility would like the video invitation sent by: Send to e-mail at: [email or web address]    This visit took place virtually, with the patient located at [facility].  ________________________________________________  Video Start Time: 1005  Mehreen Camara complains of    Chief Complaint   Patient presents with     Nursing Home Acute   HPI/Subjective: Mehreen seen today resting in her recliner. She states that she has pain, but it gets better with interventions. We note some baseline anxiety and an underlying relationship with her pain. We have consulted in-house psych to assist in this. Over the weekend, she had behaviors and on-call was contacted. This is likely secondary to her receiving 2 Zoloft doses, when Zoloft should have in-fact be discontinued (an error that was found by provider upon admission). Given Mehreen's relationship with pain and anxiety, we asked her about " titration of this drug, which she is willing to try. She is working hard in therapy. She denies constipation/diarrhea, SOB, dizziness, headache. Chart review shows her VS are stable.     History: I have reviewed and updated the patient's Past Medical History, Social History, Family History and Medication List.  ALLERGIES: Penicillins; Aspirin; Atenolol; Atorvastatin; Bupropion; Clindamycin; Codeine; Ibuprofen; Lovastatin; and Cephalexin  REVIEW OF SYSTEMS: Limited secondary to cognitive impairment but today pt reports the above and 4 point ROS including Respiratory, CV, GI and , other than that noted in the HPI, is negative.    Objective:  /80   Pulse 64   Temp 98  F (36.7  C)   Resp 16   Wt 69.7 kg (153 lb 9.6 oz)   SpO2 94%   BMI 24.79 kg/m    GENERAL APPEARANCE: Alert, in no distress, cooperative.   EYES/ENT: EOM normal on camera, hearing acuity [place].  RESP: Respiratory effort appears unlabored over video screen, no respiratory distress apparent on video, On RA.   CV: Edema appears 1+ BLE via video. Color appears pale.  ABDOMEN: Appears non-distended.  SKIN: Skin appears baseline w/ video inspection. No wounds/rashes noted.    NEURO: CN II-XII at patient's baseline, MARMOLEJO at baseline on video. Sensation baseline PPS.  PSYCH: Insight, judgement, and memory are baseline impaired, affect and mood are labile/engaged.    Laboratory/Diagnostics: Reviewed in Epic by provider today.     Assessment/Plan:  Acute bilateral low back pain with bilateral sciatica  VAMSHI (generalized anxiety disorder)  Primary osteoarthritis involving multiple joints  Generalized muscle weakness  Multiple system atrophy (H)  Movement disorder  Polypharmacy  Cognitive impairment  Acute-on-chronic. We will begin cognitive tests, continue w/ in-house psych, and increase Cymbalta. Goal to to reach max on Cymbalta (for older adults) to control both pain and anxiety. Multidisciplinary approach w/ PT/OT is ongoing. Will follow-up w/in 1  week.     Orders:  1. Increase Cymbalta from 20mg to 30mg PO QD. Dx: VAMSHI/depression.    Video-Visit Details  Type of service:  Video Visit  Video Start Time: 1005  Video End Time (time video stopped): 1013  Originating Location (pt. Location): TCU  Distant Location (provider location):  [facility] TRANSITIONAL CARE   Mode of Communication:  Video Conference.    Electronically signed by:  HAILEE Gilbert CNP DNP            Sincerely,        HAILEE Langley CNP

## 2020-08-16 VITALS
SYSTOLIC BLOOD PRESSURE: 119 MMHG | BODY MASS INDEX: 24.68 KG/M2 | RESPIRATION RATE: 18 BRPM | WEIGHT: 153.6 LBS | HEIGHT: 66 IN | HEART RATE: 68 BPM | OXYGEN SATURATION: 97 % | DIASTOLIC BLOOD PRESSURE: 66 MMHG | TEMPERATURE: 98 F

## 2020-08-16 ASSESSMENT — MIFFLIN-ST. JEOR: SCORE: 1183.48

## 2020-08-16 NOTE — PROGRESS NOTES
" [facility]  Mehreen Camara is being evaluated via a billable video visit due to the restrictions of the Covid-19 pandemic.   The patient has been notified of following:  \"This video visit will be conducted via a call between you and your provider. We have found that certain health care needs can be provided without the need for an in-person physical exam.  This service lets us provide the care you need with a video conversation. If during the course of the call the provider feels a video visit is not appropriate, you will not be charged for this service.\"   The provider has received verbal consent for a Video Visit from the patient and or first contact? Yes  Patient/facility staff would like the video invitation sent by: N/A   Video Start Time: 10:37  Which Facility the Patient is at during the time of visit: [facility]  PRIMARY CARE PROVIDER AND CLINIC:  HAILEE Contreras CNP, [address] / [place] [address]  Chief Complaint   Patient presents with     Establish Care     Video Visit     [facility] Record Number:  [id number]  Mehreen Camara  is a 80 year old  (1940), admitted to the above facility from [facility]..  Admitted to this facility for  rehab, medical management and nursing care.  HPI:    HPI information obtained from: facility staff, patient report and [facility] chart review.   Brief Summary of Hospital Course:   - Pt with PMH notable for multiple system atrophy, and  chronic pain syndrome,who resides in next [facility],  with frequent falls and progressive weakness, transferred from ED for rehab.     Updates on Status Since Skilled nursing Admission:   - developed visual hallucination, associated with physical aggression toward nursing staff, at risk for elopement.  - 08/11: Cymbalta increased from 20 mg to 30 mg for anxiety and radiculopathy better management.     Today:  - RN patient is upset about norco scheduled  \" the pain meds was helping " "better, now changed, and I have to sit here and wait for it. I got the habit of using it three times a day, and once a while at take at 10 am\"  - I never threatened in patient in my life. Last night she did not give me pain pills, and at times they got irritated when I ask many questions or ask for my pain pills\". ' I do not mind waiting\". \" I have pain all the time, it is a big thing with me, I always have it, it is really hard, sometimes I have all day and night, in my stomach, in my back, I hurt my knees and both shoulder bone on bone, have a trouble dressing, but I have people help me at my apartment.\" . \" I cannot really tell if the pain in my lower back is better, for med started two weeks ago, and cannot tell if any difference, I know it takes a longer to become effective.\"  - RN reports will be going to [facility] next door in 4 days.   - RN reports patient has lots of anxiety, daughter is concerned that Q8 hr is far, and used to be on q4 hours. At time she has some bizarre behavior. \" She gets prn either in the middle of the night or early in the morning, and gets it again b/w 11-13:00 and again at bedtime.     ====================================================================================  CODE STATUS/ADVANCE DIRECTIVES DISCUSSION:   DNR / DNI  Patient's living condition: lives in an assisted living facility  ALLERGIES: Penicillins; Aspirin; Atenolol; Atorvastatin; Bupropion; Clindamycin; Codeine; Ibuprofen; Lovastatin; and Cephalexin  PAST MEDICAL HISTORY:  has a past medical history of Adrenal adenoma, Arthritis, Depressive disorder, Hypertension, Multiple system atrophy P (H), Rheumatic fever, Small bowel obstruction (H), and Thyroid disease.  PAST SURGICAL HISTORY:   has a past surgical history that includes orthopedic surgery; Thyroidectomy (2011); joint replacement (Right, 2003); Hysterectomy (2013); carpal tunnel release rt/lt (Bilateral, 2013); Spine surgery (1981); aaa repair (2015); and " Laparoscopic cholecystectomy (N/A, 12/12/2018).  FAMILY HISTORY: family history includes Coronary Artery Disease in her father and mother; Hypertension in her mother; Other Cancer in her brother; Parkinsonism in an other family member.  SOCIAL HISTORY:   reports that she quit smoking about 25 years ago. Her smoking use included cigarettes. She smoked 0.50 packs per day. She has never used smokeless tobacco. She reports that she does not drink alcohol or use drugs.  Current Outpatient Medications   Medication Sig Dispense Refill     acetaminophen (TYLENOL) 500 MG tablet Take 500 mg by mouth 3 times daily        bisacodyl (DULCOLAX) 10 MG suppository Place 10 mg rectally daily as needed for constipation       bisacodyl (DULCOLAX) 5 MG EC tablet Take 5 mg by mouth daily (and a second time if needed)       calcium carbonate (TUMS) 500 MG chewable tablet Take 1 tablet (500 mg) by mouth every 2 hours as needed for heartburn 150 tablet 1     carbidopa-levodopa (SINEMET)  MG tablet Take 2 tablets by mouth 3 times daily (0630, 1230, 1830)       clindamycin (CLEOCIN) 300 MG capsule Take 600 mg by mouth as needed Give 1 hour prior to dental appoitments       clotrimazole (LOTRIMIN) 1 % external cream Apply topically 2 times daily as needed       diclofenac (VOLTAREN) 1 % topical gel Apply 4 g topically 4 times daily as needed for moderate pain (to back and neck) 100 g 11     DULoxetine (CYMBALTA) 20 MG capsule Take 30 mg by mouth daily       gabapentin (NEURONTIN) 100 MG capsule Take 300 mg by mouth 3 times daily        GAS RELIEF 125 MG CAPS TAKE 1 CAPSULE BY MOUTH FOUR TIMES DAILY AS NEEDED WITH MEALS 30 capsule 11     hydrochlorothiazide (HYDRODIURIL) 25 MG tablet Take 1 tablet (25 mg) by mouth daily       HYDROcodone-acetaminophen (NORCO) 5-325 MG tablet TAKE 1 TABLET BY MOUTH TWICE DAILY FOR PAIN. TAKE 1 TABLET BY MOUTH EVERY 8 HOURS AS NEEDED FOR PAIN 9-10/10. 45 tablet 0     levothyroxine (SYNTHROID/LEVOTHROID)  "100 MCG tablet Take 1 tablet (100 mcg) by mouth daily       losartan (COZAAR) 50 MG tablet TAKE TABLET BY MOUTH EVERY MORNING 30 tablet 11     magnesium citrate solution Take 296 mLs by mouth as needed for other       pantoprazole (PROTONIX) 20 MG EC tablet Take 20 mg by mouth daily       Polyethylene Glycol 3350 (MIRALAX PO) Take 1 capful by mouth daily       polyethylene glycol-propylene glycol (SYSTANE ULTRA) 0.4-0.3 % SOLN ophthalmic solution Place 2 drops into both eyes 3 times daily (and additionally as needed/requested)       vitamin D3 (CHOLECALCIFEROL) 2000 units (50 mcg) tablet Take 2,000 Units by mouth daily       Zinc Oxide 13 % CREA Apply topically daily       ROS: 4 point ROS including Respiratory, CV, GI and , other than that noted in the HPI,  is negative  Vitals:/66   Pulse 68   Temp 98  F (36.7  C)   Resp 18   Ht 1.676 m (5' 6\")   Wt 69.7 kg (153 lb 9.6 oz)   SpO2 97%   BMI 24.79 kg/m     Limited Visit Exam done given COVID-19 precautions:  GENERAL APPEARANCE:  in no distress  RESP:  unlabored breathing  M/S:   no joint deformity noted  SKIN:  no rash noted  NEURO:   no purposeful movement in upper and lower extremities  PSYCH:  affect and mood normal, slight anxiety, thought process fair, affected memory short and long term.     Lab/Diagnostic data: Reviewed in the chart and EHR.      ASSESSMENT/PLAN:  ----------------------------------  Primary OA involving multiple joints.   Chronic bilateral low back pain with bilateral sciatica  Frequent falls:  Moderated spinal canal stenosis  - RN initially reports patient is on scheduled bid and Q 8hr prn, noted this is in epic as well. However, after a discussion with nurse manager and reviewing order PCC EHR at the facility, it was noted that patient is on norco Q 8hr prn. Has been requiring 3-4 times. According to nurse manager, patient's daughter is concerned about the frequency, and stated that her mother pain was better controlled with " Q4 hours.   - However, given a concern for insomnia, will allow changing frequency change to Q 6 prn at this time.   - Suboptimal analgesia likely is contributing to anxiety and some nocturnal behaviors.       Query Cognitive impairment  Visual Hallucination  VAMSHI and night Terror  REM sleep behavior d/o  - physical aggression toward nursing staff, elopement attempt  - sertraline 20 mg  was stopped and started on duloxetine 20 mg (see NP Sargent on 7/14/2020)  - CPT 4.2/5.6 (? July 2020). BIMS 14/15.   - Hx of delirium likely 2/2 steroid and increased opioid dose.  -consider SLUM or MOCA.       Multiple system atrophy- P (H)  Query Autonomic dysfunction  - at baseline. Followed by Neurology. Appreciate help.       Essential HTN: controlled.       Hypothyroidism:  - TSH 0.28 (July 2020), FT4 was not checked, LT4 reduced from 137 mcg to 100 mcg (July 21st)  - consider repeating TSH and FT4 in 3-5 days.      Order:   - ok to change norco frequency from q 8hr to Q 6 hour, and monitor for confusion and fall.    Electronically signed by:  Ignacia Reyes MD     Video-Visit Details  Type of service:  Video Visit  Video End Time (time video stopped): 11:04  Distant Location (provider location):  [facility]

## 2020-08-17 ENCOUNTER — VIRTUAL VISIT (OUTPATIENT)
Dept: GERIATRICS | Facility: CLINIC | Age: 80
End: 2020-08-17
Payer: COMMERCIAL

## 2020-08-17 DIAGNOSIS — G23.8 MULTIPLE SYSTEM ATROPHY (H): ICD-10-CM

## 2020-08-17 DIAGNOSIS — R41.89 COGNITIVE IMPAIRMENT: ICD-10-CM

## 2020-08-17 DIAGNOSIS — M54.41 CHRONIC BILATERAL LOW BACK PAIN WITH BILATERAL SCIATICA: ICD-10-CM

## 2020-08-17 DIAGNOSIS — M54.42 CHRONIC BILATERAL LOW BACK PAIN WITH BILATERAL SCIATICA: ICD-10-CM

## 2020-08-17 DIAGNOSIS — G90.3 MULTIPLE SYSTEM ATROPHY (H): ICD-10-CM

## 2020-08-17 DIAGNOSIS — I10 ESSENTIAL HYPERTENSION: ICD-10-CM

## 2020-08-17 DIAGNOSIS — G47.52 REM SLEEP BEHAVIOR DISORDER: ICD-10-CM

## 2020-08-17 DIAGNOSIS — M15.0 PRIMARY OSTEOARTHRITIS INVOLVING MULTIPLE JOINTS: Primary | ICD-10-CM

## 2020-08-17 DIAGNOSIS — M62.81 GENERALIZED MUSCLE WEAKNESS: ICD-10-CM

## 2020-08-17 DIAGNOSIS — G89.29 CHRONIC BILATERAL LOW BACK PAIN WITH BILATERAL SCIATICA: ICD-10-CM

## 2020-08-17 PROCEDURE — 99306 1ST NF CARE HIGH MDM 50: CPT | Mod: GT | Performed by: FAMILY MEDICINE

## 2020-08-17 NOTE — LETTER
"    8/17/2020        RE: Mehreen Camara  [facility]  [address]  [facility] [address]         [facility]  Mehreen Camara is being evaluated via a billable video visit due to the restrictions of the Covid-19 pandemic.   The patient has been notified of following:  \"This video visit will be conducted via a call between you and your provider. We have found that certain health care needs can be provided without the need for an in-person physical exam.  This service lets us provide the care you need with a video conversation. If during the course of the call the provider feels a video visit is not appropriate, you will not be charged for this service.\"   The provider has received verbal consent for a Video Visit from the patient and or first contact? Yes  Patient/facility staff would like the video invitation sent by: N/A   Video Start Time: 10:37  Which Facility the Patient is at during the time of visit: [facility]  PRIMARY CARE PROVIDER AND CLINIC:  HAILEE Contreras CNP, [address] [place] [address] [address] / [facility] [address]  Chief Complaint   Patient presents with     Establish Care     Video Visit     [facility] Medical Record Number:  [id number]  Mehreen Camara  is a 80 year old  (1940), admitted to the above facility from [facility]..  Admitted to this facility for  rehab, medical management and nursing care.  HPI:    HPI information obtained from: facility staff, patient report and [facility] chart review.   Brief Summary of Hospital Course:   - Pt with PMH notable for multiple system atrophy, and  chronic pain syndrome,who resides in next [facility],  with frequent falls and progressive weakness, transferred from ED for rehab.     Updates on Status Since Skilled nursing Admission:   - developed visual hallucination, associated with physical aggression toward nursing staff, at risk for elopement.  - 08/11: Cymbalta increased from 20 mg to 30 mg for anxiety " "and radiculopathy better management.     Today:  - RN patient is upset about norco scheduled  \" the pain meds was helping better, now changed, and I have to sit here and wait for it. I got the habit of using it three times a day, and once a while at take at 10 am\"  - I never threatened in patient in my life. Last night she did not give me pain pills, and at times they got irritated when I ask many questions or ask for my pain pills\". ' I do not mind waiting\". \" I have pain all the time, it is a big thing with me, I always have it, it is really hard, sometimes I have all day and night, in my stomach, in my back, I hurt my knees and both shoulder bone on bone, have a trouble dressing, but I have people help me at my apartment.\" . \" I cannot really tell if the pain in my lower back is better, for med started two weeks ago, and cannot tell if any difference, I know it takes a longer to become effective.\"  - RN reports will be going to snf next door in 4 days.   - RN reports patient has lots of anxiety, daughter is concerned that Q8 hr is far, and used to be on q4 hours. At time she has some bizarre behavior. \" She gets prn either in the middle of the night or early in the morning, and gets it again b/w 11-13:00 and again at bedtime.     ====================================================================================  CODE STATUS/ADVANCE DIRECTIVES DISCUSSION:   DNR / DNI  Patient's living condition: lives in an assisted living facility  ALLERGIES: Penicillins; Aspirin; Atenolol; Atorvastatin; Bupropion; Clindamycin; Codeine; Ibuprofen; Lovastatin; and Cephalexin  PAST MEDICAL HISTORY:  has a past medical history of Adrenal adenoma, Arthritis, Depressive disorder, Hypertension, Multiple system atrophy P (H), Rheumatic fever, Small bowel obstruction (H), and Thyroid disease.  PAST SURGICAL HISTORY:   has a past surgical history that includes orthopedic surgery; Thyroidectomy (2011); joint replacement (Right, 2003); " Hysterectomy (2013); carpal tunnel release rt/lt (Bilateral, 2013); Spine surgery (1981); aaa repair (2015); and Laparoscopic cholecystectomy (N/A, 12/12/2018).  FAMILY HISTORY: family history includes Coronary Artery Disease in her father and mother; Hypertension in her mother; Other Cancer in her brother; Parkinsonism in an other family member.  SOCIAL HISTORY:   reports that she quit smoking about 25 years ago. Her smoking use included cigarettes. She smoked 0.50 packs per day. She has never used smokeless tobacco. She reports that she does not drink alcohol or use drugs.  Current Outpatient Medications   Medication Sig Dispense Refill     acetaminophen (TYLENOL) 500 MG tablet Take 500 mg by mouth 3 times daily        bisacodyl (DULCOLAX) 10 MG suppository Place 10 mg rectally daily as needed for constipation       bisacodyl (DULCOLAX) 5 MG EC tablet Take 5 mg by mouth daily (and a second time if needed)       calcium carbonate (TUMS) 500 MG chewable tablet Take 1 tablet (500 mg) by mouth every 2 hours as needed for heartburn 150 tablet 1     carbidopa-levodopa (SINEMET)  MG tablet Take 2 tablets by mouth 3 times daily (0630, 1230, 1830)       clindamycin (CLEOCIN) 300 MG capsule Take 600 mg by mouth as needed Give 1 hour prior to dental appoitments       clotrimazole (LOTRIMIN) 1 % external cream Apply topically 2 times daily as needed       diclofenac (VOLTAREN) 1 % topical gel Apply 4 g topically 4 times daily as needed for moderate pain (to back and neck) 100 g 11     DULoxetine (CYMBALTA) 20 MG capsule Take 30 mg by mouth daily       gabapentin (NEURONTIN) 100 MG capsule Take 300 mg by mouth 3 times daily        GAS RELIEF 125 MG CAPS TAKE 1 CAPSULE BY MOUTH FOUR TIMES DAILY AS NEEDED WITH MEALS 30 capsule 11     hydrochlorothiazide (HYDRODIURIL) 25 MG tablet Take 1 tablet (25 mg) by mouth daily       HYDROcodone-acetaminophen (NORCO) 5-325 MG tablet TAKE 1 TABLET BY MOUTH TWICE DAILY FOR PAIN. TAKE 1  "TABLET BY MOUTH EVERY 8 HOURS AS NEEDED FOR PAIN 9-10/10. 45 tablet 0     levothyroxine (SYNTHROID/LEVOTHROID) 100 MCG tablet Take 1 tablet (100 mcg) by mouth daily       losartan (COZAAR) 50 MG tablet TAKE TABLET BY MOUTH EVERY MORNING 30 tablet 11     magnesium citrate solution Take 296 mLs by mouth as needed for other       pantoprazole (PROTONIX) 20 MG EC tablet Take 20 mg by mouth daily       Polyethylene Glycol 3350 (MIRALAX PO) Take 1 capful by mouth daily       polyethylene glycol-propylene glycol (SYSTANE ULTRA) 0.4-0.3 % SOLN ophthalmic solution Place 2 drops into both eyes 3 times daily (and additionally as needed/requested)       vitamin D3 (CHOLECALCIFEROL) 2000 units (50 mcg) tablet Take 2,000 Units by mouth daily       Zinc Oxide 13 % CREA Apply topically daily       ROS: 4 point ROS including Respiratory, CV, GI and , other than that noted in the HPI,  is negative  Vitals:/66   Pulse 68   Temp 98  F (36.7  C)   Resp 18   Ht 1.676 m (5' 6\")   Wt 69.7 kg (153 lb 9.6 oz)   SpO2 97%   BMI 24.79 kg/m     Limited Visit Exam done given COVID-19 precautions:  GENERAL APPEARANCE:  in no distress  RESP:  unlabored breathing  M/S:   no joint deformity noted  SKIN:  no rash noted  NEURO:   no purposeful movement in upper and lower extremities  PSYCH:  affect and mood normal, slight anxiety, thought process fair, affected memory short and long term.     Lab/Diagnostic data: Reviewed in the chart and EHR.      ASSESSMENT/PLAN:  ----------------------------------  Primary OA involving multiple joints.   Chronic bilateral low back pain with bilateral sciatica  Frequent falls:  Moderated spinal canal stenosis  - RN initially reports patient is on scheduled bid and Q 8hr prn, noted this is in epic as well. However, after a discussion with nurse manager and reviewing order PCC EHR at the facility, it was noted that patient is on norco Q 8hr prn. Has been requiring 3-4 times. According to nurse manager, " patient's daughter is concerned about the frequency, and stated that her mother pain was better controlled with Q4 hours.   - However, given a concern for insomnia, will allow changing frequency change to Q 6 prn at this time.   - Suboptimal analgesia likely is contributing to anxiety and some nocturnal behaviors.       Query Cognitive impairment  Visual Hallucination  VAMSHI and night Terror  REM sleep behavior d/o  - physical aggression toward nursing staff, elopement attempt  - sertraline 20 mg  was stopped and started on duloxetine 20 mg (see NP Sargent on 7/14/2020)  - CPT 4.2/5.6 (? July 2020). BIMS 14/15.   - Hx of delirium likely 2/2 steroid and increased opioid dose.  -consider SLUM or MOCA.       Multiple system atrophy- P (H)  Query Autonomic dysfunction  - at baseline. Followed by Neurology. Appreciate help.       Essential HTN: controlled.       Hypothyroidism:  - TSH 0.28 (July 2020), FT4 was not checked, LT4 reduced from 137 mcg to 100 mcg (July 21st)  - consider repeating TSH and FT4 in 3-5 days.      Order:   - ok to change norco frequency from q 8hr to Q 6 hour, and monitor for confusion and fall.    Electronically signed by:  Ignacia Reyes MD     Video-Visit Details  Type of service:  Video Visit  Video End Time (time video stopped): 11:04  Distant Location (provider location):  [facility]                 Sincerely,        Ignacia Reyes MD

## 2020-08-18 ENCOUNTER — VIRTUAL VISIT (OUTPATIENT)
Dept: GERIATRICS | Facility: CLINIC | Age: 80
End: 2020-08-18
Payer: COMMERCIAL

## 2020-08-18 VITALS
TEMPERATURE: 98 F | HEART RATE: 81 BPM | WEIGHT: 154.4 LBS | RESPIRATION RATE: 20 BRPM | DIASTOLIC BLOOD PRESSURE: 87 MMHG | OXYGEN SATURATION: 95 % | SYSTOLIC BLOOD PRESSURE: 148 MMHG | BODY MASS INDEX: 24.92 KG/M2

## 2020-08-18 DIAGNOSIS — M54.42 CHRONIC BILATERAL LOW BACK PAIN WITH BILATERAL SCIATICA: ICD-10-CM

## 2020-08-18 DIAGNOSIS — R41.89 COGNITIVE AND BEHAVIORAL CHANGES: ICD-10-CM

## 2020-08-18 DIAGNOSIS — M62.81 GENERALIZED MUSCLE WEAKNESS: ICD-10-CM

## 2020-08-18 DIAGNOSIS — I10 ESSENTIAL HYPERTENSION: ICD-10-CM

## 2020-08-18 DIAGNOSIS — R41.89 COGNITIVE IMPAIRMENT: ICD-10-CM

## 2020-08-18 DIAGNOSIS — F33.9 EPISODE OF RECURRENT MAJOR DEPRESSIVE DISORDER, UNSPECIFIED DEPRESSION EPISODE SEVERITY (H): ICD-10-CM

## 2020-08-18 DIAGNOSIS — G23.2 MULTIPLE SYSTEM ATROPHY P (H): ICD-10-CM

## 2020-08-18 DIAGNOSIS — R44.3 HALLUCINATIONS: ICD-10-CM

## 2020-08-18 DIAGNOSIS — M15.0 PRIMARY OSTEOARTHRITIS INVOLVING MULTIPLE JOINTS: Primary | ICD-10-CM

## 2020-08-18 DIAGNOSIS — M54.41 CHRONIC BILATERAL LOW BACK PAIN WITH BILATERAL SCIATICA: ICD-10-CM

## 2020-08-18 DIAGNOSIS — G89.29 CHRONIC BILATERAL LOW BACK PAIN WITH BILATERAL SCIATICA: ICD-10-CM

## 2020-08-18 DIAGNOSIS — F41.1 GAD (GENERALIZED ANXIETY DISORDER): ICD-10-CM

## 2020-08-18 DIAGNOSIS — G25.9 MOVEMENT DISORDER: ICD-10-CM

## 2020-08-18 DIAGNOSIS — R46.89 COGNITIVE AND BEHAVIORAL CHANGES: ICD-10-CM

## 2020-08-18 PROCEDURE — 99207 ZZC CDG-CODE CATEGORY CHANGED: CPT | Performed by: NURSE PRACTITIONER

## 2020-08-18 PROCEDURE — 99316 NF DSCHRG MGMT 30 MIN+: CPT | Mod: GT | Performed by: NURSE PRACTITIONER

## 2020-08-19 ENCOUNTER — VIRTUAL VISIT (OUTPATIENT)
Dept: GERIATRICS | Facility: CLINIC | Age: 80
End: 2020-08-19
Payer: COMMERCIAL

## 2020-08-19 VITALS
BODY MASS INDEX: 24.92 KG/M2 | SYSTOLIC BLOOD PRESSURE: 148 MMHG | DIASTOLIC BLOOD PRESSURE: 72 MMHG | HEART RATE: 66 BPM | WEIGHT: 154.4 LBS | RESPIRATION RATE: 16 BRPM | OXYGEN SATURATION: 95 % | TEMPERATURE: 98.5 F

## 2020-08-19 DIAGNOSIS — G90.3 MULTIPLE SYSTEM ATROPHY (H): ICD-10-CM

## 2020-08-19 DIAGNOSIS — Z79.899 POLYPHARMACY: ICD-10-CM

## 2020-08-19 DIAGNOSIS — M15.0 PRIMARY OSTEOARTHRITIS INVOLVING MULTIPLE JOINTS: ICD-10-CM

## 2020-08-19 DIAGNOSIS — M54.42 CHRONIC BILATERAL LOW BACK PAIN WITH BILATERAL SCIATICA: ICD-10-CM

## 2020-08-19 DIAGNOSIS — F43.23 ADJUSTMENT DISORDER WITH MIXED ANXIETY AND DEPRESSED MOOD: Primary | ICD-10-CM

## 2020-08-19 DIAGNOSIS — G23.8 MULTIPLE SYSTEM ATROPHY (H): ICD-10-CM

## 2020-08-19 DIAGNOSIS — F41.1 GAD (GENERALIZED ANXIETY DISORDER): ICD-10-CM

## 2020-08-19 DIAGNOSIS — R46.89 COGNITIVE AND BEHAVIORAL CHANGES: ICD-10-CM

## 2020-08-19 DIAGNOSIS — M54.41 CHRONIC BILATERAL LOW BACK PAIN WITH BILATERAL SCIATICA: ICD-10-CM

## 2020-08-19 DIAGNOSIS — R44.3 HALLUCINATIONS: ICD-10-CM

## 2020-08-19 DIAGNOSIS — R41.89 COGNITIVE AND BEHAVIORAL CHANGES: ICD-10-CM

## 2020-08-19 DIAGNOSIS — G89.29 CHRONIC BILATERAL LOW BACK PAIN WITH BILATERAL SCIATICA: ICD-10-CM

## 2020-08-19 DIAGNOSIS — R41.89 COGNITIVE IMPAIRMENT: ICD-10-CM

## 2020-08-19 PROCEDURE — 99358 PROLONG SERVICE W/O CONTACT: CPT | Performed by: NURSE PRACTITIONER

## 2020-08-19 RX ORDER — HYDROCHLOROTHIAZIDE 25 MG/1
TABLET ORAL
Qty: 31 TABLET | Refills: 11 | Status: SHIPPED | OUTPATIENT
Start: 2020-08-19 | End: 2022-02-27 | Stop reason: DRUGHIGH

## 2020-08-19 NOTE — PROGRESS NOTES
[facility]  NON-FACE TO FACE PROLONGED SERVICE  Mehreen Camara 1940  Date of Related Face to Face Service: 8/18/20  INTENT OF SERVICE  This is an extended evaluation of patient's specific problem. The service relates to a recent or future E/M visit.  Documentation supports medical necessity, relates to ongoing patient management and documents start times and stop times.    Regarding the following diagnoses:     Adjustment disorder with mixed anxiety and depressed mood  Primary osteoarthritis involving multiple joints  Chronic bilateral low back pain with bilateral sciatica  Cognitive impairment  Multiple system atrophy (H)  Hallucinations  VAMSHI (generalized anxiety disorder)  Polypharmacy  Cognitive and behavioral changes,     I reviewed records for patient's complicated medical history.      Start time 1230  Stop time 1232) = 2 minutes.    I discussed with Irasema Mohini who is daughter and NOK of the patient.      (Start time 1235  Stop time 1304) = 29 minutes.     I coordinated care with Becky () and Mariam Arita (nurses) at facility via email, text, and phone call.     (Start time 1615  Stop time 1424) = 9 minutes.    ASSESSMENT/PLAN     Adjustment disorder with mixed anxiety and depressed mood  Primary osteoarthritis involving multiple joints  Chronic bilateral low back pain with bilateral sciatica  Cognitive impairment  Multiple system atrophy (H)  Hallucinations  VAMSHI (generalized anxiety disorder)  Polypharmacy  Cognitive and behavioral changes  Acute-on-chronic. Reassurance and education given to daughter Irasema. Together, we formulated a plan to manage symptoms and further diagnostics moving forward with specialists. 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 Irasema is in agreement. Until then, we will continue w/ our titration of Cymbalta and discourage use of Norco  each time she is uncomfortable/anxious. Follow-up routinely or as needed.    Orders:  1. Neurology referral. Evaluate for dementia. Dx: cognitive impairment.   2. Neuropsych referral. Cognitive testing and psychiatric evaluation. Dx: depression/VAMSHI, cognitive impairment.     TIME  Total time spent with Non-Face to Face prolonged service 40 minutes.     Electronically signed by:  Dr. Lisa Parry, APRN CNP DNP

## 2020-08-20 NOTE — PROGRESS NOTES
"[facility] Geriatrics Video Visit  Mehreen Camara is a 80 year old female who is being evaluated via a billable video visit due to the restrictions of the COVID19 pandemic.The patient has been notified of following: \"This video visit will be conducted via a call between you and your provider. We have found that certain health care needs can be provided without the need for an in-person physical exam.  This service lets us provide the care you need with a video conversation.  If a prescription is necessary we can send it directly to your pharmacy.  If lab work is needed we can place an order for that and you can then stop by our lab to have the test done at a later time. If during the course of the call the provider feels a video visit is not appropriate, you will not be charged for this service.\"     Proivder has received verbal consent for a Video Visit from the patient? Yes    Patient/facility would like the video invitation sent by: Send to e-mail at: [email or web address]    This visit took place virtually, with the patient located at [facility].  ________________________________________________  Video Start Time: 1240  PATIENT'S NAME: Mehreen Camara. : 1940. MRN: [id number]. PRIMARY CARE PROVIDER AND CLINIC RESPONSIBLE AFTER TRANSFER:  [facility] Provider   Transferring providers: HAILEE Gilbert CNP DNP & Ignacia Reyes MD  Recent Hospitalization/ED:  Assisted Living  [facility], ED visit prior for pain.  Date of TCU Admission:   Date of TCU (anticipated) Discharge:   Discharged to: previous assisted living  Cognitive Scores: BIMS: 14/15, SLUMS: 30 and CPT: (patient refused).  Physical Function: Tinetti Balance Assessment:   DME: None.  CODE STATUS/ADVANCE DIRECTIVES DISCUSSION: DNR/DNI.  ALLERGIES: Penicillins; Aspirin; Atenolol; Atorvastatin; Bupropion; Clindamycin; Codeine; Ibuprofen; " Lovastatin; and Cephalexin    DISCHARGE DIAGNOSIS/NURSING FACILITY COURSE:   Primary osteoarthritis involving multiple joints  Chronic bilateral low back pain with bilateral sciatica  Generalized muscle weakness  Movement disorder  Multiple system atrophy P (H)  Cognitive impairment  VAMSHI (generalized anxiety disorder)  Essential hypertension  Episode of recurrent major depressive disorder, unspecified depression episode severity (H)  Hallucinations  Cognitive and behavioral changes  Mehreen came to TCU on 8/6 s/p ED visit. She was tested as COVID negative prior to her arrival. She states that she always has pain and that it is never controlled. She becomes tangential with her speaking and she is unable to stay focused in conversation.    Throughout her stay, she displayed highs and lows with her personality. Yelling at staff, becoming irate. This was witnessed by provider. Whenever she felt loss of control, she would become inconsolable and combative. She was overheard multiple times by provider screaming, crying, and wanting attention. She seems to revert to a child-like state. Nursing has reported hallucinations (patient seeing bugs, children).    She denies headaches, dizziness. She denies CP, SOB, constipation/diarrhea. She denies nausea/heartburn. She denies fevers. She had many diagnostics from ED and while in TCU which are grossly negative. She is high anxiety and has very few coping mechanisms in the TCU environment. We titrated up Cymbalta and consulted in-house psych. We are recommending outpatient neurology eval for dementia and neuropsych input for concomitant psychiatric disorder given behaviors and cognitive testing completed here. Family in agreement.     We will discharge her and note she will likely have less anxiety in her own environment. We note she inappropriately uses PRN Norco for her anxiety. We will continue to trial medication changes to help her find comfort.     Past Medical History:  has  a past medical history of Adrenal adenoma, Arthritis, Depressive disorder, Hypertension, Multiple system atrophy P (H), Rheumatic fever, Small bowel obstruction (H), and Thyroid disease.  Discharge Medications:  Current Outpatient Medications   Medication Sig Dispense Refill     acetaminophen (TYLENOL) 500 MG tablet Take 500 mg by mouth 3 times daily        bisacodyl (DULCOLAX) 10 MG suppository Place 10 mg rectally daily as needed for constipation       bisacodyl (DULCOLAX) 5 MG EC tablet Take 5 mg by mouth daily (and a second time if needed)       calcium carbonate (TUMS) 500 MG chewable tablet Take 1 tablet (500 mg) by mouth every 2 hours as needed for heartburn 150 tablet 1     carbidopa-levodopa (SINEMET)  MG tablet Take 2 tablets by mouth 3 times daily (0630, 1230, 1830)       clindamycin (CLEOCIN) 300 MG capsule Take 600 mg by mouth as needed Give 1 hour prior to dental appoitments       clotrimazole (LOTRIMIN) 1 % external cream Apply topically 2 times daily as needed       diclofenac (VOLTAREN) 1 % topical gel Apply 4 g topically 4 times daily as needed for moderate pain (to back and neck) 100 g 11     DULoxetine (CYMBALTA) 20 MG capsule Take 30 mg by mouth daily       gabapentin (NEURONTIN) 100 MG capsule Take 300 mg by mouth 3 times daily        GAS RELIEF 125 MG CAPS TAKE 1 CAPSULE BY MOUTH FOUR TIMES DAILY AS NEEDED WITH MEALS 30 capsule 11     hydrochlorothiazide (HYDRODIURIL) 25 MG tablet TAKE 1 TABLET BY MOUTH EVERY DAY DX HYPERTENSION AND LE EDEMA 31 tablet 11     HYDROcodone-acetaminophen (NORCO) 5-325 MG tablet TAKE 1 TABLET BY MOUTH TWICE DAILY FOR PAIN. TAKE 1 TABLET BY MOUTH EVERY 8 HOURS AS NEEDED FOR PAIN 9-10/10. 45 tablet 0     levothyroxine (SYNTHROID/LEVOTHROID) 100 MCG tablet Take 1 tablet (100 mcg) by mouth daily       losartan (COZAAR) 50 MG tablet TAKE TABLET BY MOUTH EVERY MORNING 30 tablet 11     magnesium citrate solution Take 296 mLs by mouth as needed for other        pantoprazole (PROTONIX) 20 MG EC tablet Take 20 mg by mouth daily       Polyethylene Glycol 3350 (MIRALAX PO) Take 1 capful by mouth daily       polyethylene glycol-propylene glycol (SYSTANE ULTRA) 0.4-0.3 % SOLN ophthalmic solution Place 2 drops into both eyes 3 times daily (and additionally as needed/requested)       vitamin D3 (CHOLECALCIFEROL) 2000 units (50 mcg) tablet Take 2,000 Units by mouth daily       Zinc Oxide 13 % CREA Apply topically daily        Medication Changes/Rationale:     many    Controlled medications sent with patient:   not applicable/none  Medication: Norco , remaining tabs given to patient at the time of discharge to take home     ROS: Limited secondary to cognitive impairment but today pt reports the above and 4 point ROS including Respiratory, CV, GI and , other than that noted in the HPI, is negative.    Physical Exam:   Vitals: BP (!) 148/87   Pulse 81   Temp 98  F (36.7  C)   Resp 20   Wt 70 kg (154 lb 6.4 oz)   SpO2 95%   BMI 24.92 kg/m    BMI= Body mass index is 24.92 kg/m .  GENERAL APPEARANCE: Alert, in no distress, cooperative.   EYES/ENT: EOM normal on camera, hearing acuity [place].  RESP: Respiratory effort appears unlabored over video screen, no respiratory distress apparent on video,  On RA.   SKIN: Skin appears baseline w/ video inspection. No wounds/rashes noted.   NEURO: CN II-XII at patient's baseline, MARMOLEJO at baseline on video. Sensation baseline PPS.  PSYCH: Insight, judgement, and memory are impaired, affect and mood are labile.    TCU Labs: Recent labs in [facility] reviewed by me today.     DISCHARGE PLAN:    Follow up labs: No labs orders/due    Medical Follow Up:  Follow up with primary care provider in 4 weeks    [facility] referral needed and placed by this provider: No    Current [place] scheduled appointments: None, but pending w/ neurology and neuropsych.    Discharge Services: Out Patient: PT/OT.      TOTAL DISCHARGE TIME:   Greater than 30 minutes    Orders:  1. No  new orders.    Video-Visit Details  Type of service:  Video Visit  Video Start Time: 1240  Video End Time (time video stopped): 1245  Originating Location (pt. Location): TCU  Distant Location (provider location):  GERIATRICS TRANSITIONAL CARE   Mode of Communication:  Video Conference.    Electronically signed by:  HAILEE Gilbert CNP DNP

## 2020-08-25 ENCOUNTER — VIRTUAL VISIT (OUTPATIENT)
Dept: GERIATRICS | Facility: CLINIC | Age: 80
End: 2020-08-25
Payer: COMMERCIAL

## 2020-08-25 VITALS — TEMPERATURE: 97.7 F | OXYGEN SATURATION: 98 %

## 2020-08-25 DIAGNOSIS — G23.8 MULTIPLE SYSTEM ATROPHY (H): ICD-10-CM

## 2020-08-25 DIAGNOSIS — F33.9 EPISODE OF RECURRENT MAJOR DEPRESSIVE DISORDER, UNSPECIFIED DEPRESSION EPISODE SEVERITY (H): ICD-10-CM

## 2020-08-25 DIAGNOSIS — R41.89 COGNITIVE IMPAIRMENT: ICD-10-CM

## 2020-08-25 DIAGNOSIS — G90.3 MULTIPLE SYSTEM ATROPHY (H): ICD-10-CM

## 2020-08-25 DIAGNOSIS — F43.23 ADJUSTMENT DISORDER WITH MIXED ANXIETY AND DEPRESSED MOOD: ICD-10-CM

## 2020-08-25 DIAGNOSIS — F41.1 GAD (GENERALIZED ANXIETY DISORDER): ICD-10-CM

## 2020-08-25 DIAGNOSIS — M15.0 PRIMARY OSTEOARTHRITIS INVOLVING MULTIPLE JOINTS: Primary | ICD-10-CM

## 2020-08-25 PROCEDURE — 99207 ZZC CDG-MDM COMPONENT: MEETS MODERATE - UP CODED: CPT | Performed by: NURSE PRACTITIONER

## 2020-08-25 RX ORDER — HYDROCODONE BITARTRATE AND ACETAMINOPHEN 5; 325 MG/1; MG/1
1 TABLET ORAL EVERY 6 HOURS PRN
Qty: 45 TABLET | Refills: 0
Start: 2020-08-25 | End: 2020-08-26

## 2020-08-25 NOTE — PROGRESS NOTES
"[facility] Geriatrics Video Visit  Mehreen Camara is a 80 year old female who is being evaluated via a billable video visit due to the restrictions of the COVID19 pandemic.The patient has been notified of following: \"This video visit will be conducted via a call between you and your provider. We have found that certain health care needs can be provided without the need for an in-person physical exam.  This service lets us provide the care you need with a video conversation.  If a prescription is necessary we can send it directly to your pharmacy.  If lab work is needed we can place an order for that and you can then stop by our lab to have the test done at a later time. If during the course of the call the provider feels a video visit is not appropriate, you will not be charged for this service.\"     Proivder has received verbal consent for a Video Visit from the patient? Yes.    Patient/facility would like the video invitation sent by: Send to e-mail at: [email or web address]    This visit took place virtually, with the patient located at [facility].  ________________________________________________  Video Start Time: 1249  Mehreen Camara complains of    Chief Complaint   Patient presents with     Video Visit     Episodic   HPI/Subjective: Mehreen seen today on follow-up as she was discharged from TCU last week. She states that she wants Norco on her old \"routine\" whether or not she is having pain. She states staff should just assume that she has pain and give it to her. She also c/o ongoing anxiety and lack of control. 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 She otherwise denies numbness/tingling, she denies nausea/heartburn, SOB, CP, headaches. Chart review shows VS are stable.     History: I have reviewed and updated the patient's Past Medical History, Social " History, Family History and Medication List.  ALLERGIES: Penicillins; Aspirin; Atenolol; Atorvastatin; Bupropion; Clindamycin; Codeine; Ibuprofen; Lovastatin; and Cephalexin  REVIEW OF SYSTEMS: Limited secondary to cognitive impairment but today pt reports the above and 4 point ROS including Respiratory, CV, GI and , other than that noted in the HPI, is negative.    Objective:  Temp 97.7  F (36.5  C)   SpO2 98%   GENERAL APPEARANCE: Alert, in no distress, cooperative.   EYES/ENT: EOM normal on camera, hearing acuity [place].  RESP: Respiratory effort appears unlabored over video screen, no respiratory distress apparent on video, On RA.   CV: Edema appears trace BLE via video. Color appears pale.  ABDOMEN: Appears non-distended, rounded  SKIN: Skin appears baseline w/ video inspection. No wounds/rashes noted.    NEURO: CN II-XII at patient's baseline, MARMOLEJO at baseline on video. Sensation baseline PPS.  PSYCH: Insight, judgement, and memory are baseline impaired, affect and mood are happy/engaged.    Laboratory/Diagnostics: Reviewed in Epic by provider today.     Assessment/Plan:  Primary osteoarthritis involving multiple joints  Cognitive impairment  Multiple system atrophy (H)  VAMSHI (generalized anxiety disorder)  Episode of recurrent major depressive disorder, unspecified depression episode severity (H)  Adjustment disorder with mixed anxiety and depressed mood  Chronic. Ongoing.    We note this patient is inappropriately using her norco to treat her anxiety. She was counseled around Tylenol intake.     We believe she has pain, and therefore we will increase Cymbalta to combat both pain and mood disorder.    Given she experiences ups and downs with her anxiety and underlying psychiatric disorder, we will trial low-dose, fast-acting Buspar for immediately relief during these times.   Follow-up routinely or as needed.    Orders:  1. Increase Cymbalta from 30mg to 60mg PO every day. Dx: VAMSHI.  2. Buspar 5mg PO BID PRN x  14 days. Dx: VAMSHI.     Video-Visit Details  Type of service:  Video Visit  Video Start Time: 1249  Video End Time (time video stopped): 1300  Originating Location (pt. Location): Assisted Living  Distant Location (provider location):  [facility] ASSISTED LIVING   Mode of Communication:  Video Conference.    Electronically signed by:  Dr. Lisa Parry, APRN CNP DNP

## 2020-08-25 NOTE — LETTER
"    8/25/2020        RE: Mehreen Camara  [facility]  [address]  [facility] [address]        [facility] Geriatrics Video Visit  Mehreen Camara is a 80 year old female who is being evaluated via a billable video visit due to the restrictions of the COVID19 pandemic.The patient has been notified of following: \"This video visit will be conducted via a call between you and your provider. We have found that certain health care needs can be provided without the need for an in-person physical exam.  This service lets us provide the care you need with a video conversation.  If a prescription is necessary we can send it directly to your pharmacy.  If lab work is needed we can place an order for that and you can then stop by our lab to have the test done at a later time. If during the course of the call the provider feels a video visit is not appropriate, you will not be charged for this service.\"     Proivder has received verbal consent for a Video Visit from the patient? Yes.    Patient/facility would like the video invitation sent by: Send to e-mail at: [email or web address]    This visit took place virtually, with the patient located at [facility].  ________________________________________________  Video Start Time: 1249  Mehreen Camara complains of    Chief Complaint   Patient presents with     Video Visit     Episodic   HPI/Subjective: Mehreen seen today on follow-up as she was discharged from TCU last week. She states that she wants Norco on her old \"routine\" whether or not she is having pain. She states staff should just assume that she has pain and give it to her. She also c/o ongoing anxiety and lack of control. 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 She otherwise denies numbness/tingling, she denies nausea/heartburn, SOB, CP, headaches. " Chart review shows VS are stable.     History: I have reviewed and updated the patient's Past Medical History, Social History, Family History and Medication List.  ALLERGIES: Penicillins; Aspirin; Atenolol; Atorvastatin; Bupropion; Clindamycin; Codeine; Ibuprofen; Lovastatin; and Cephalexin  REVIEW OF SYSTEMS: Limited secondary to cognitive impairment but today pt reports the above and 4 point ROS including Respiratory, CV, GI and , other than that noted in the HPI, is negative.    Objective:  Temp 97.7  F (36.5  C)   SpO2 98%   GENERAL APPEARANCE: Alert, in no distress, cooperative.   EYES/ENT: EOM normal on camera, hearing acuity [place].  RESP: Respiratory effort appears unlabored over video screen, no respiratory distress apparent on video, On RA.   CV: Edema appears trace BLE via video. Color appears pale.  ABDOMEN: Appears non-distended, rounded  SKIN: Skin appears baseline w/ video inspection. No wounds/rashes noted.    NEURO: CN II-XII at patient's baseline, MARMOLEJO at baseline on video. Sensation baseline PPS.  PSYCH: Insight, judgement, and memory are baseline impaired, affect and mood are happy/engaged.    Laboratory/Diagnostics: Reviewed in Epic by provider today.     Assessment/Plan:  Primary osteoarthritis involving multiple joints  Cognitive impairment  Multiple system atrophy (H)  VAMSHI (generalized anxiety disorder)  Episode of recurrent major depressive disorder, unspecified depression episode severity (H)  Adjustment disorder with mixed anxiety and depressed mood  Chronic. Ongoing.    We note this patient is inappropriately using her norco to treat her anxiety. She was counseled around Tylenol intake.     We believe she has pain, and therefore we will increase Cymbalta to combat both pain and mood disorder.    Given she experiences ups and downs with her anxiety and underlying psychiatric disorder, we will trial low-dose, fast-acting Buspar for immediately relief during these times.   Follow-up routinely  or as needed.    Orders:  1. Increase Cymbalta from 30mg to 60mg PO every day. Dx: VAMSHI.  2. Buspar 5mg PO BID PRN x 14 days. Dx: VAMSHI.     Video-Visit Details  Type of service:  Video Visit  Video Start Time: 1249  Video End Time (time video stopped): 1300  Originating Location (pt. Location): Assisted Living  Distant Location (provider location):  [facility] ASSISTED LIVING   Mode of Communication:  Video Conference.    Electronically signed by:  HAILEE Gilbert CNP DNP          Sincerely,        HAILEE Langley CNP

## 2020-08-26 RX ORDER — BUSPIRONE HYDROCHLORIDE 5 MG/1
5 TABLET ORAL 2 TIMES DAILY PRN
COMMUNITY
End: 2020-11-11

## 2020-08-26 RX ORDER — HYDROCODONE BITARTRATE AND ACETAMINOPHEN 5; 325 MG/1; MG/1
1 TABLET ORAL EVERY 6 HOURS PRN
Qty: 45 TABLET | Refills: 0
Start: 2020-08-26 | End: 2020-10-05

## 2020-08-28 ENCOUNTER — RECORDS - HEALTHEAST (OUTPATIENT)
Dept: LAB | Facility: CLINIC | Age: 80
End: 2020-08-28

## 2020-08-31 LAB — TSH SERPL DL<=0.005 MIU/L-ACNC: 1.36 UIU/ML (ref 0.3–5)

## 2020-09-04 ENCOUNTER — MEDICAL CORRESPONDENCE (OUTPATIENT)
Dept: HEALTH INFORMATION MANAGEMENT | Facility: CLINIC | Age: 80
End: 2020-09-04

## 2020-09-17 DIAGNOSIS — G90.3 MULTIPLE SYSTEM ATROPHY (H): Primary | ICD-10-CM

## 2020-09-17 DIAGNOSIS — G23.8 MULTIPLE SYSTEM ATROPHY (H): Primary | ICD-10-CM

## 2020-09-18 ENCOUNTER — VIRTUAL VISIT (OUTPATIENT)
Dept: GERIATRICS | Facility: CLINIC | Age: 80
End: 2020-09-18
Payer: COMMERCIAL

## 2020-09-18 VITALS
BODY MASS INDEX: 25.18 KG/M2 | SYSTOLIC BLOOD PRESSURE: 148 MMHG | OXYGEN SATURATION: 92 % | WEIGHT: 156 LBS | HEART RATE: 66 BPM | TEMPERATURE: 97.7 F | DIASTOLIC BLOOD PRESSURE: 72 MMHG | RESPIRATION RATE: 16 BRPM

## 2020-09-18 DIAGNOSIS — E03.9 HYPOTHYROIDISM, UNSPECIFIED TYPE: ICD-10-CM

## 2020-09-18 DIAGNOSIS — R41.89 COGNITIVE IMPAIRMENT: ICD-10-CM

## 2020-09-18 DIAGNOSIS — G23.2 MULTIPLE SYSTEM ATROPHY P (H): ICD-10-CM

## 2020-09-18 DIAGNOSIS — Z79.899 POLYPHARMACY: Primary | ICD-10-CM

## 2020-09-18 DIAGNOSIS — F41.1 GAD (GENERALIZED ANXIETY DISORDER): ICD-10-CM

## 2020-09-18 RX ORDER — CHOLECALCIFEROL (VITAMIN D3) 25 MCG
TABLET ORAL
Qty: 60 TABLET | Refills: 11 | Status: SHIPPED | OUTPATIENT
Start: 2020-09-18 | End: 2021-02-27

## 2020-09-18 RX ORDER — CARBIDOPA AND LEVODOPA 25; 100 MG/1; MG/1
TABLET ORAL
Qty: 180 TABLET | Refills: 11 | Status: SHIPPED | OUTPATIENT
Start: 2020-09-18 | End: 2020-11-19

## 2020-09-18 NOTE — LETTER
"    9/18/2020        RE: Mehreen Camara  [facility]  [address]  [place] [address]        [facility] Geriatrics Video Visit  Mehreen Camara is a 80 year old female who is being evaluated via a billable video visit due to the restrictions of the COVID19 pandemic.The patient has been notified of following: \"This video visit will be conducted via a call between you and your provider. We have found that certain health care needs can be provided without the need for an in-person physical exam.  This service lets us provide the care you need with a video conversation.  If a prescription is necessary we can send it directly to your pharmacy.  If lab work is needed we can place an order for that and you can then stop by our lab to have the test done at a later time. If during the course of the call the provider feels a video visit is not appropriate, you will not be charged for this service.\"     Proivder has received verbal consent for a Video Visit from the patient? Yes    Patient/facility would like the video invitation sent by: Send to e-mail at: [email or web address]    This visit took place virtually, with the patient located at [facility].  ________________________________________________  Video Start Time: 1054  Mehreen Camara complains of    Chief Complaint   Patient presents with     Video Visit     RECHECK   HPI/Subjective: Mehreen seen today on follow-up. Her daughter had reached out with concerns about her thyroid and questions about her bowel medications. Today, Mehreen speaks with word salad, is defensive, and states that staff do not give her extra medications when she asks for it. She notes that the Miralax is helpful, but she still feels constipated. She has not been utilizing her PRN Senna, she states she \"didn't know she had that.\" She states that her thyroid was \"just checked last week.\" A few weeks ago, after she had returned to her apartment " s/p TCU stay we rechecked, but we note this can be inaccurate during acute illness. That level was 1.36 (appropriate).      History: I have reviewed and updated the patient's Past Medical History, Social History, Family History and Medication List.  ALLERGIES: Penicillins; Aspirin; Atenolol; Atorvastatin; Bupropion; Clindamycin; Codeine; Ibuprofen; Lovastatin; and Cephalexin   Current Outpatient Medications   Medication Sig Dispense Refill     acetaminophen (TYLENOL) 500 MG tablet Take 500 mg by mouth 3 times daily        bisacodyl (DULCOLAX) 10 MG suppository Place 10 mg rectally daily as needed for constipation       bisacodyl (DULCOLAX) 5 MG EC tablet Take 5 mg by mouth daily (and a second time if needed)       busPIRone (BUSPAR) 5 MG tablet Take 5 mg by mouth 2 times daily as needed for anxiety       calcium carbonate (TUMS) 500 MG chewable tablet Take 1 tablet (500 mg) by mouth every 2 hours as needed for heartburn 150 tablet 1     carbidopa-levodopa (SINEMET)  MG tablet TAKE 2 TABLETS BY MOUTH THREE TIMES DAILY 180 tablet 11     clindamycin (CLEOCIN) 300 MG capsule Take 600 mg by mouth as needed Give 1 hour prior to dental appoitments       clotrimazole (LOTRIMIN) 1 % external cream Apply topically 2 times daily as needed       diclofenac (VOLTAREN) 1 % topical gel Apply 4 g topically 4 times daily as needed for moderate pain (to back and neck) 100 g 11     DULoxetine (CYMBALTA) 20 MG capsule Take 60 mg by mouth daily       gabapentin (NEURONTIN) 100 MG capsule Take 300 mg by mouth 3 times daily        GAS RELIEF 125 MG CAPS TAKE 1 CAPSULE BY MOUTH FOUR TIMES DAILY AS NEEDED WITH MEALS 30 capsule 11     hydrochlorothiazide (HYDRODIURIL) 25 MG tablet TAKE 1 TABLET BY MOUTH EVERY DAY DX HYPERTENSION AND LE EDEMA 31 tablet 11     HYDROcodone-acetaminophen (NORCO) 5-325 MG tablet Take 1 tablet by mouth every 6 hours as needed for severe pain 45 tablet 0     levothyroxine (SYNTHROID/LEVOTHROID) 100 MCG tablet  Take 1 tablet (100 mcg) by mouth daily       losartan (COZAAR) 50 MG tablet TAKE TABLET BY MOUTH EVERY MORNING 30 tablet 11     magnesium citrate solution Take 296 mLs by mouth as needed for other       pantoprazole (PROTONIX) 20 MG EC tablet Take 20 mg by mouth daily       Polyethylene Glycol 3350 (MIRALAX PO) Take 1 capful by mouth daily       polyethylene glycol-propylene glycol (SYSTANE ULTRA) 0.4-0.3 % SOLN ophthalmic solution Place 2 drops into both eyes 3 times daily (and additionally as needed/requested)       vitamin D3 (CHOLECALCIFEROL) 2000 units (50 mcg) tablet Take 2,000 Units by mouth daily       VITAMIN D3 25 MCG (1000 UT) tablet TAKE 2 TABLETS (2000IU) BY MOUTH EVERY DAY DX OSTEOPOROSIS 60 tablet 11     Zinc Oxide 13 % CREA Apply topically daily        REVIEW OF SYSTEMS: Limited secondary to cognitive impairment but today pt reports the above and 4 point ROS including Respiratory, CV, GI and , other than that noted in the HPI, is negative.    Objective:  BP (!) 148/72   Pulse 66   Temp 97.7  F (36.5  C)   Resp 16   Wt 70.8 kg (156 lb)   SpO2 92%   BMI 25.18 kg/m    GENERAL APPEARANCE: Alert, in no distress, cooperative.   EYES/ENT: EOM normal on camera, hearing acuity [place].  RESP: Respiratory effort appears unlabored over video screen, no respiratory distress apparent on video, On RA.   ABDOMEN: Appears non-distended, rounded.  SKIN: Skin appears baseline w/ video inspection. No wounds/rashes noted.    NEURO: CN II-XII at patient's baseline, MARMOLEJO at baseline on video. Sensation baseline PPS.  PSYCH: Insight, judgement, and memory are baseline impaired, affect and mood are defensive/irritable.    Laboratory/Diagnostics: Reviewed in Epic by provider today.     Assessment/Plan:  Polypharmacy  VAMSHI (generalized anxiety disorder)  Cognitive impairment  Multiple system atrophy P (H)  Hypothyroidism, unspecified type  Chronic. Ongoing. We note neuropsych appt in October, which may assist in Mehreen's  treatment plan moving forward. We note that her thyroid level may have changed, and will recheck after correspondence with daughter. Follow-up routinely or as needed.    Orders:  1. TSH x1 on 9/21. Dx: hypothyroidism.    Video-Visit Details  Type of service:  Video Visit  Video Start Time: 1054  Video End Time (time video stopped): 1107  Originating Location (pt. Location): Assisted Living  Distant Location (provider location):  [facility] ASSISTED LIVING   Mode of Communication:  Video Conference.    Electronically signed by:  HAILEE Gilbert CNP [facility]          Sincerely,        HAILEE Langley CNP

## 2020-09-18 NOTE — PROGRESS NOTES
"[facility] Video Visit  Mehreen Camara is a 80 year old female who is being evaluated via a billable video visit due to the restrictions of the COVID19 pandemic.The patient has been notified of following: \"This video visit will be conducted via a call between you and your provider. We have found that certain health care needs can be provided without the need for an in-person physical exam.  This service lets us provide the care you need with a video conversation.  If a prescription is necessary we can send it directly to your pharmacy.  If lab work is needed we can place an order for that and you can then stop by our lab to have the test done at a later time. If during the course of the call the provider feels a video visit is not appropriate, you will not be charged for this service.\"     [facility] has received verbal consent for a Video Visit from the patient? Yes    Patient/facility would like the video invitation sent by: Send to e-mail at: [email or web address]    This visit took place virtually, with the patient located at [facility].  ________________________________________________  Video Start Time: 1054  Mehreen Camara complains of    Chief Complaint   Patient presents with     Video Visit     RECHECK   HPI/Subjective: Mehreen seen today on follow-up. Her daughter had reached out with concerns about her thyroid and questions about her bowel medications. Today, Mehreen speaks with word salad, is defensive, and states that staff do not give her extra medications when she asks for it. She notes that the Miralax is helpful, but she still feels constipated. She has not been utilizing her PRN Senna, she states she \"didn't know she had that.\" She states that her thyroid was \"just checked last week.\" A few weeks ago, after she had returned to her apartment s/p TCU stay we rechecked, but we note this can be inaccurate during acute illness. That level was 1.36 (appropriate).  "     History: I have reviewed and updated the patient's Past Medical History, Social History, Family History and Medication List.  ALLERGIES: Penicillins; Aspirin; Atenolol; Atorvastatin; Bupropion; Clindamycin; Codeine; Ibuprofen; Lovastatin; and Cephalexin   Current Outpatient Medications   Medication Sig Dispense Refill     acetaminophen (TYLENOL) 500 MG tablet Take 500 mg by mouth 3 times daily        bisacodyl (DULCOLAX) 10 MG suppository Place 10 mg rectally daily as needed for constipation       bisacodyl (DULCOLAX) 5 MG EC tablet Take 5 mg by mouth daily (and a second time if needed)       busPIRone (BUSPAR) 5 MG tablet Take 5 mg by mouth 2 times daily as needed for anxiety       calcium carbonate (TUMS) 500 MG chewable tablet Take 1 tablet (500 mg) by mouth every 2 hours as needed for heartburn 150 tablet 1     carbidopa-levodopa (SINEMET)  MG tablet TAKE 2 TABLETS BY MOUTH THREE TIMES DAILY 180 tablet 11     clindamycin (CLEOCIN) 300 MG capsule Take 600 mg by mouth as needed Give 1 hour prior to dental appoitments       clotrimazole (LOTRIMIN) 1 % external cream Apply topically 2 times daily as needed       diclofenac (VOLTAREN) 1 % topical gel Apply 4 g topically 4 times daily as needed for moderate pain (to back and neck) 100 g 11     DULoxetine (CYMBALTA) 20 MG capsule Take 60 mg by mouth daily       gabapentin (NEURONTIN) 100 MG capsule Take 300 mg by mouth 3 times daily        GAS RELIEF 125 MG CAPS TAKE 1 CAPSULE BY MOUTH FOUR TIMES DAILY AS NEEDED WITH MEALS 30 capsule 11     hydrochlorothiazide (HYDRODIURIL) 25 MG tablet TAKE 1 TABLET BY MOUTH EVERY DAY DX HYPERTENSION AND LE EDEMA 31 tablet 11     HYDROcodone-acetaminophen (NORCO) 5-325 MG tablet Take 1 tablet by mouth every 6 hours as needed for severe pain 45 tablet 0     levothyroxine (SYNTHROID/LEVOTHROID) 100 MCG tablet Take 1 tablet (100 mcg) by mouth daily       losartan (COZAAR) 50 MG tablet TAKE TABLET BY MOUTH EVERY MORNING 30  tablet 11     magnesium citrate solution Take 296 mLs by mouth as needed for other       pantoprazole (PROTONIX) 20 MG EC tablet Take 20 mg by mouth daily       Polyethylene Glycol 3350 (MIRALAX PO) Take 1 capful by mouth daily       polyethylene glycol-propylene glycol (SYSTANE ULTRA) 0.4-0.3 % SOLN ophthalmic solution Place 2 drops into both eyes 3 times daily (and additionally as needed/requested)       vitamin D3 (CHOLECALCIFEROL) 2000 units (50 mcg) tablet Take 2,000 Units by mouth daily       VITAMIN D3 25 MCG (1000 UT) tablet TAKE 2 TABLETS (2000IU) BY MOUTH EVERY DAY DX OSTEOPOROSIS 60 tablet 11     Zinc Oxide 13 % CREA Apply topically daily        REVIEW OF SYSTEMS: Limited secondary to cognitive impairment but today pt reports the above and 4 point ROS including Respiratory, CV, GI and , other than that noted in the HPI, is negative.    Objective:  BP (!) 148/72   Pulse 66   Temp 97.7  F (36.5  C)   Resp 16   Wt 70.8 kg (156 lb)   SpO2 92%   BMI 25.18 kg/m    GENERAL APPEARANCE: Alert, in no distress, cooperative.   EYES/ENT: EOM normal on camera, hearing acuity [place].  RESP: Respiratory effort appears unlabored over video screen, no respiratory distress apparent on video, On RA.   ABDOMEN: Appears non-distended, rounded.  SKIN: Skin appears baseline w/ video inspection. No wounds/rashes noted.    NEURO: CN II-XII at patient's baseline, MARMOLEJO at baseline on video. Sensation baseline PPS.  PSYCH: Insight, judgement, and memory are baseline impaired, affect and mood are defensive/irritable.    Laboratory/Diagnostics: Reviewed in Epic by provider today.     Assessment/Plan:  Polypharmacy  VAMSHI (generalized anxiety disorder)  Cognitive impairment  Multiple system atrophy P (H)  Hypothyroidism, unspecified type  Chronic. Ongoing. We note neuropsych appt in October, which may assist in Mehreen's treatment plan moving forward. We note that her thyroid level may have changed, and will recheck after  correspondence with daughter. Follow-up routinely or as needed.    Orders:  1. TSH x1 on 9/21. Dx: hypothyroidism.    Video-Visit Details  Type of service:  Video Visit  Video Start Time: 1054  Video End Time (time video stopped): 1107  Originating Location (pt. Location): Assisted Living  Distant Location (provider location):  [facility]   Mode of Communication:  Video Conference.    Electronically signed by:  Dr. Lisa Parry, APRN CNP DNP

## 2020-09-19 ENCOUNTER — RECORDS - HEALTHEAST (OUTPATIENT)
Dept: LAB | Facility: CLINIC | Age: 80
End: 2020-09-19

## 2020-09-21 LAB — TSH SERPL DL<=0.005 MIU/L-ACNC: 1.43 UIU/ML (ref 0.3–5)

## 2020-09-28 ENCOUNTER — TELEPHONE (OUTPATIENT)
Dept: NEUROLOGY | Facility: CLINIC | Age: 80
End: 2020-09-28

## 2020-09-28 NOTE — TELEPHONE ENCOUNTER
Health Call Center    Phone Message    May a detailed message be left on voicemail: yes     Reason for Call: Other: Irasema calling due to her mother having frequent hallucinations. Mehreen does know that she is having these, but will not tell the staff at the assisted living due to her not wanting them to think she is crazy. Irasema is wondering if there is anything that could help with these. Please call Irasema back at your earliest convenience to discuss.     Action Taken: Message routed to:  [facility] ([facility] NEUROLOGY    Travel Screening: Not Applicable
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
## 2020-09-29 ENCOUNTER — PATIENT OUTREACH (OUTPATIENT)
Dept: NEUROLOGY | Facility: CLINIC | Age: 80
End: 2020-09-29

## 2020-09-29 NOTE — PROGRESS NOTES
"Situation:  Mehreen Camara is a 80 year old female who receives support for MSA. I am calling her to inform her of her appointment to discuss the hallucinations.    Background:    8/5/2020 - Hospitalized for fall    Medications:  She takes Norco 5-325 1 tab 3-4 times daily  Carbidopa/levodopa 2 tabs TID    Assessment:  I informed Mehreen her appointment will be on 10/6 at 3:30 PM to talk about the hallucinations. Mehreen talked in detail about seeing people in her house, sleeping in her bed, and asking her about posion she is growing. They are very frightening to her. They have been worsening since her hospitalization on 8/5/2020 She became tearful exclaiming she does not know what to do and she does not want to be labeled as crazy and sedated in bed all day.   She knows she has had some medication changes but does not know what has changed.  She became tearful, \"don't take my pain med's away.\" The Norco helps greatly with her back and knee pain. She feels like she is falling apart. Sometimes she cannot get words out. During our conversation some words were quiet and jumbled every few sentences.    She reports she feels better after taking her carbidopa/levodopa. She is not able to explain how it makes her feel better. She would like to stay on this and Norco. I informed her she can discuss adding a medication to help the hallucinations also. The goal would be to start with a tiny dose and slowly increase to help the hallucinations without experiences intolerable side effects such as sedation.    Plan/recommendation/education:  1. I will call [facility] assisted [facility] to get an updated medication list.    2. I emphasized the importance of telling Dr. Monterroso everything going on especially with the hallucinations so he can help her.    3. Appointment on 10/6 at 3:30 PM    4. I reassured Mehreen that she is not crazy. Hallucinations can occur from MSA, medications or a combination of things and there are different " options to go about treating them.    ADDENDUM  [facility] will fax over Mehreen's medication list

## 2020-09-30 ENCOUNTER — MEDICAL CORRESPONDENCE (OUTPATIENT)
Dept: HEALTH INFORMATION MANAGEMENT | Facility: CLINIC | Age: 80
End: 2020-09-30

## 2020-10-05 DIAGNOSIS — M15.0 PRIMARY OSTEOARTHRITIS INVOLVING MULTIPLE JOINTS: ICD-10-CM

## 2020-10-05 RX ORDER — HYDROCODONE BITARTRATE AND ACETAMINOPHEN 5; 325 MG/1; MG/1
TABLET ORAL
Qty: 60 TABLET | Refills: 0 | Status: ON HOLD | OUTPATIENT
Start: 2020-10-05 | End: 2020-10-12

## 2020-10-05 NOTE — TELEPHONE ENCOUNTER
I informed Irasema the best course of action would be for Mehreen to adjust her pain medication regimen and try alternative therapies. Norco is likely causing her hallucinations. Irasema expressed concern as Mehreen has decreased Norco but has had more pain because of this. She has GI pain, back and neck pain that is severe. She will contact the nurse practitioner, Kassidy Massey at the facility who has taken over the prescription. I let her know that Brenna Miranda last prescribed Norco today.

## 2020-10-05 NOTE — TELEPHONE ENCOUNTER
[facility] Call Center    Phone Message    May a detailed message be left on voicemail: yes     Reason for Call: Other: Pt daughter Irasema called back to let you know that this medication is not new - so she is wondering if the hallucinations could be from something else - please call back to discuss - Thanks     Action Taken: Message routed to:  [facility] Neurology    Travel Screening: Not Applicable
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
## 2020-10-07 ENCOUNTER — ASSISTED LIVING VISIT (OUTPATIENT)
Dept: GERIATRICS | Facility: CLINIC | Age: 80
End: 2020-10-07
Payer: COMMERCIAL

## 2020-10-07 VITALS — BODY MASS INDEX: 25.02 KG/M2 | TEMPERATURE: 98.3 F | WEIGHT: 155 LBS | OXYGEN SATURATION: 96 %

## 2020-10-07 DIAGNOSIS — F02.80 LEWY BODY DEMENTIA WITHOUT BEHAVIORAL DISTURBANCE (H): ICD-10-CM

## 2020-10-07 DIAGNOSIS — M15.0 PRIMARY OSTEOARTHRITIS INVOLVING MULTIPLE JOINTS: Primary | ICD-10-CM

## 2020-10-07 DIAGNOSIS — G23.2 MULTIPLE SYSTEM ATROPHY P (H): ICD-10-CM

## 2020-10-07 DIAGNOSIS — G31.83 LEWY BODY DEMENTIA WITHOUT BEHAVIORAL DISTURBANCE (H): ICD-10-CM

## 2020-10-07 DIAGNOSIS — F33.9 EPISODE OF RECURRENT MAJOR DEPRESSIVE DISORDER, UNSPECIFIED DEPRESSION EPISODE SEVERITY (H): ICD-10-CM

## 2020-10-07 DIAGNOSIS — F41.1 GAD (GENERALIZED ANXIETY DISORDER): ICD-10-CM

## 2020-10-07 DIAGNOSIS — Z79.899 POLYPHARMACY: ICD-10-CM

## 2020-10-07 PROCEDURE — 99207 PR CDG-MDM COMPONENT: MEETS MODERATE - UP CODED: CPT | Performed by: NURSE PRACTITIONER
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
## 2020-10-07 NOTE — PROGRESS NOTES
[facility]  [place] MRN: [id number]. Place of Service where encounter took place:  [facility] [[id number]]  Chief Complaint   Patient presents with     RECHECK     Wellness Visit   HPI: Mehreen Camara  is a 80 year old (1940), who is being seen today for an episodic care visit.  HPI information obtained from: facility chart records, facility staff, patient report, [facility] chart review and Care Everywhere [facility] chart review. Today's concern is:    Mehreen was supposed to see neurology and have some neurocognitive testing. It is our opinion that she has significant dementia with an underlying psychiatric disorder that she is using opioids to cover. However, her neurology appt was postponed as neurology suggested we taper off of opioid. Mehreen experiences significant arthritic pain, and this may not be completely possible, though we may be able to switch her to something else.     Today, Mehreen is accusatory, tangential, she has racing thoughts and verbalizations. She states that the nurses do origami in her apartment at night with Kleenex. She states that nobody tells her anything and everyone is out to get her. She confirms that she has a lot of pain and is willing to wean herself from [place]. We note she is only using 1-3 tab/day and supplementing w/ Tylenol. She denies bowel or bladder changes. She denies fever, SOB. She states she is fearful and out-of-sorts. It was very difficult to comfort or redirect her as her thoughts and statements were paranoid and obsessive. Chart review shows her VS are stable.     Past Medical and Surgical History reviewed in Epic today.  MEDICATIONS:  Current Outpatient Medications   Medication Sig Dispense Refill     acetaminophen (TYLENOL) 500 MG tablet Take 500 mg by mouth 3 times daily        bisacodyl (DULCOLAX) 10 MG suppository Place 10 mg rectally daily as needed for constipation       bisacodyl (DULCOLAX) 5 MG EC tablet Take 5 mg by mouth  daily (and a second time if needed)       busPIRone (BUSPAR) 5 MG tablet Take 5 mg by mouth 2 times daily as needed for anxiety       calcium carbonate (TUMS) 500 MG chewable tablet Take 1 tablet (500 mg) by mouth every 2 hours as needed for heartburn 150 tablet 1     carbidopa-levodopa (SINEMET)  MG tablet TAKE 2 TABLETS BY MOUTH THREE TIMES DAILY 180 tablet 11     clindamycin (CLEOCIN) 300 MG capsule Take 600 mg by mouth as needed Give 1 hour prior to dental appoitments       clotrimazole (LOTRIMIN) 1 % external cream Apply topically 2 times daily as needed       diclofenac (VOLTAREN) 1 % topical gel Apply 4 g topically 4 times daily as needed for moderate pain (to back and neck) 100 g 11     DULoxetine (CYMBALTA) 20 MG capsule Take 60 mg by mouth daily       gabapentin (NEURONTIN) 100 MG capsule Take 300 mg by mouth 3 times daily        GAS RELIEF 125 MG CAPS TAKE 1 CAPSULE BY MOUTH FOUR TIMES DAILY AS NEEDED WITH MEALS 30 capsule 11     hydrochlorothiazide (HYDRODIURIL) 25 MG tablet TAKE 1 TABLET BY MOUTH EVERY DAY DX HYPERTENSION AND LE EDEMA 31 tablet 11     HYDROcodone-acetaminophen (NORCO) 5-325 MG tablet TAKE 1 TABLET BY MOUTH EVERY 6 HOURS AS NEEDED FOR PAIN (MAX APAP 4GM/24HRS) 60 tablet 0     levothyroxine (SYNTHROID/LEVOTHROID) 100 MCG tablet Take 1 tablet (100 mcg) by mouth daily       losartan (COZAAR) 50 MG tablet TAKE TABLET BY MOUTH EVERY MORNING 30 tablet 11     magnesium citrate solution Take 296 mLs by mouth as needed for other       pantoprazole (PROTONIX) 20 MG EC tablet Take 20 mg by mouth daily       Polyethylene Glycol 3350 (MIRALAX PO) Take 1 capful by mouth daily       polyethylene glycol-propylene glycol (SYSTANE ULTRA) 0.4-0.3 % SOLN ophthalmic solution Place 2 drops into both eyes 3 times daily (and additionally as needed/requested)       vitamin D3 (CHOLECALCIFEROL) 2000 units (50 mcg) tablet Take 2,000 Units by mouth daily       VITAMIN D3 25 MCG (1000 UT) tablet TAKE 2 TABLETS  (2000IU) BY MOUTH EVERY DAY DX OSTEOPOROSIS 60 tablet 11     Zinc Oxide 13 % CREA Apply topically daily        REVIEW OF SYSTEMS: Limited secondary to cognitive impairment but today pt reports the above and 4 point ROS including Respiratory, CV, GI and , other than that noted in the HPI, is negative.    Objective:  Temp 98.3  F (36.8  C)   Wt 70.3 kg (155 lb)   SpO2 96%   BMI 25.02 kg/m    Exam:  GENERAL APPEARANCE: Alert, in no distress, cooperative.   ENT: Mouth/posterior oropharynx intact w/ moist mucous membranes, hearing acuity Kiana.  EYES: EOM, conjunctivae, lids, pupils and irises normal, PERRL2.   ABDOMEN: Normal bowel sounds, soft, non-tender abdomen, and no masses palpated.  SKIN: Inspection/Palpation of skin and subcutaneous tissue baseline w/ fragility. No wounds/rashes noted.   NEURO: CN II-XII at patient's baseline, sensation baseline PPS.  PSYCH: Insight, judgement, and memory are impaired, affect and mood are paranoid, defensive, delusional.    Labs:   Recent labs in [place] reviewed by me today.     ASSESSMENT/PLAN:  Primary osteoarthritis involving multiple joints  Lewy body dementia without behavioral disturbance (H)  VAMSHI (generalized anxiety disorder)  Multiple system atrophy P (H)  Episode of recurrent major depressive disorder, unspecified depression episode severity (H)  Polypharmacy  Acute-on-chronic. Tenuous. Not well controlled.     We will consult MTM for opioid equivalent changes.     We will coordinate care with neurology, as it is unlikely that Norco is causing all of Mehreen's sxs, as she has been on this for years.     We are of the opinion that she would greatly find comfort in an antipsychotic.     We will coordinate care with nursing and daughter, as a neuropsych/neurology visit may not be necessary given her extreme sxs.   Will follow-up when these connections are made.     Orders:  No new orders.    ___________________________  Annual Wellness Visit    Are you in the first 12  "months of your Medicare Part B coverage?  No  Physical Health:    In general, how would you rate your overall physical health? fair    Outside of work, how many days during the week do you exercise?none    Outside of work, approximately how many minutes a day do you exercise?not applicable    If you drink alcohol do you typically have >3 drinks per day or >7 drinks per week? No    Do you usually eat at least 4 servings of fruit and vegetables a day, include whole grains & fiber and avoid regularly eating high fat or \"junk\" foods? Yes    Do you have any problems taking medications regularly? No    Do you have any side effects from medications? none    Needs assistance for the following daily activities: transportation, shopping, preparing meals, housework, laundry, money management and taking medicine    Which of the following safety concerns are present in your home?  poor lighting     Hearing impairment: Yes, Need to ask people to speak up or repeat themselves.    Difficulty understanding soft or whispered speech.    In the past 6 months, have you been bothered by leaking of urine? yes  Mental Health:    In general, how would you rate your overall mental or emotional health? fair  PHQ-2 Score:   PHQ-2 ( 1999 [employer]) 10/8/2020   Q1: Little interest or pleasure in doing things 1   Q2: Feeling down, depressed or hopeless 1   PHQ-2 Score 2        Do you feel safe in your environment? No  Have you ever done Advance Care Planning? (For example, a Health Directive, POLST, or a discussion with a medical provider or your loved ones about your wishes)? Yes, advance care planning is on file.  Fall risk:    Cognitive Screening: Recent SLUMS 16/30, BIMS 14/15, and patient refused CPT testing.   Do you have sleep apnea, excessive snoring or daytime drowsiness?: no  Current providers sharing in care for this patient include: Dr. Lisa Parry, APRN CNP DNP  ___________________________  Electronically signed by:  Dr. Gonzalze " HAILEE Parry CNP DNP

## 2020-10-10 ENCOUNTER — APPOINTMENT (OUTPATIENT)
Dept: CT IMAGING | Facility: CLINIC | Age: 80
DRG: 552 | End: 2020-10-10
Attending: EMERGENCY MEDICINE
Payer: COMMERCIAL

## 2020-10-10 ENCOUNTER — APPOINTMENT (OUTPATIENT)
Dept: GENERAL RADIOLOGY | Facility: CLINIC | Age: 80
DRG: 552 | End: 2020-10-10
Attending: ORTHOPAEDIC SURGERY
Payer: COMMERCIAL

## 2020-10-10 ENCOUNTER — HOSPITAL ENCOUNTER (INPATIENT)
Facility: CLINIC | Age: 80
LOS: 1 days | Discharge: NURSING, REHAB OR LONG-TERM CARE | DRG: 552 | End: 2020-10-12
Attending: EMERGENCY MEDICINE | Admitting: FAMILY MEDICINE
Payer: COMMERCIAL
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
## 2020-10-10 DIAGNOSIS — M15.0 PRIMARY OSTEOARTHRITIS INVOLVING MULTIPLE JOINTS: ICD-10-CM

## 2020-10-10 DIAGNOSIS — Z03.818 ENCNTR FOR OBS FOR SUSP EXPSR TO OTH BIOLG AGENTS RULED OUT: ICD-10-CM

## 2020-10-10 DIAGNOSIS — S32.040A COMPRESSION FRACTURE OF L4 VERTEBRA, INITIAL ENCOUNTER (H): ICD-10-CM

## 2020-10-10 DIAGNOSIS — F43.22 ADJUSTMENT DISORDER WITH ANXIOUS MOOD: Primary | ICD-10-CM

## 2020-10-10 LAB
ALBUMIN UR-MCNC: NEGATIVE MG/DL
ANION GAP SERPL CALCULATED.3IONS-SCNC: 4 MMOL/L (ref 3–14)
APPEARANCE UR: CLEAR
BASOPHILS # BLD AUTO: 0 10E9/L (ref 0–0.2)
BASOPHILS NFR BLD AUTO: 0.8 %
BILIRUB UR QL STRIP: NEGATIVE
BUN SERPL-MCNC: 15 MG/DL (ref 7–30)
CALCIUM SERPL-MCNC: 8.4 MG/DL (ref 8.5–10.1)
CHLORIDE SERPL-SCNC: 104 MMOL/L (ref 94–109)
CO2 SERPL-SCNC: 32 MMOL/L (ref 20–32)
COLOR UR AUTO: ABNORMAL
CREAT SERPL-MCNC: 0.55 MG/DL (ref 0.52–1.04)
DIFFERENTIAL METHOD BLD: NORMAL
EOSINOPHIL # BLD AUTO: 0 10E9/L (ref 0–0.7)
EOSINOPHIL NFR BLD AUTO: 0.8 %
ERYTHROCYTE [DISTWIDTH] IN BLOOD BY AUTOMATED COUNT: 11.4 % (ref 10–15)
GFR SERPL CREATININE-BSD FRML MDRD: 88 ML/MIN/{1.73_M2}
GLUCOSE SERPL-MCNC: 101 MG/DL (ref 70–99)
GLUCOSE UR STRIP-MCNC: NEGATIVE MG/DL
HCT VFR BLD AUTO: 41.6 % (ref 35–47)
HGB BLD-MCNC: 13.5 G/DL (ref 11.7–15.7)
HGB UR QL STRIP: NEGATIVE
IMM GRANULOCYTES # BLD: 0 10E9/L (ref 0–0.4)
IMM GRANULOCYTES NFR BLD: 0 %
KETONES UR STRIP-MCNC: NEGATIVE MG/DL
LEUKOCYTE ESTERASE UR QL STRIP: NEGATIVE
LYMPHOCYTES # BLD AUTO: 1.3 10E9/L (ref 0.8–5.3)
LYMPHOCYTES NFR BLD AUTO: 24.1 %
MCH RBC QN AUTO: 31.3 PG (ref 26.5–33)
MCHC RBC AUTO-ENTMCNC: 32.5 G/DL (ref 31.5–36.5)
MCV RBC AUTO: 96 FL (ref 78–100)
MONOCYTES # BLD AUTO: 0.4 10E9/L (ref 0–1.3)
MONOCYTES NFR BLD AUTO: 6.8 %
NEUTROPHILS # BLD AUTO: 3.6 10E9/L (ref 1.6–8.3)
NEUTROPHILS NFR BLD AUTO: 67.5 %
NITRATE UR QL: NEGATIVE
NRBC # BLD AUTO: 0 10*3/UL
NRBC BLD AUTO-RTO: 0 /100
PH UR STRIP: 8 PH (ref 5–7)
PLATELET # BLD AUTO: 194 10E9/L (ref 150–450)
POTASSIUM SERPL-SCNC: 3.5 MMOL/L (ref 3.4–5.3)
RBC # BLD AUTO: 4.32 10E12/L (ref 3.8–5.2)
SARS-COV-2 RNA SPEC QL NAA+PROBE: NOT DETECTED
SODIUM SERPL-SCNC: 140 MMOL/L (ref 133–144)
SOURCE: ABNORMAL
SP GR UR STRIP: 1 (ref 1–1.03)
SPECIMEN SOURCE: NORMAL
UROBILINOGEN UR STRIP-MCNC: 0 MG/DL (ref 0–2)
WBC # BLD AUTO: 5.3 10E9/L (ref 4–11)

## 2020-10-10 PROCEDURE — 250N000013 HC RX MED GY IP 250 OP 250 PS 637: Performed by: FAMILY MEDICINE

## 2020-10-10 PROCEDURE — 72131 CT LUMBAR SPINE W/O DYE: CPT

## 2020-10-10 PROCEDURE — 85025 COMPLETE CBC W/AUTO DIFF WBC: CPT | Performed by: EMERGENCY MEDICINE

## 2020-10-10 PROCEDURE — 99285 EMERGENCY DEPT VISIT HI MDM: CPT | Performed by: EMERGENCY MEDICINE

## 2020-10-10 PROCEDURE — G0378 HOSPITAL OBSERVATION PER HR: HCPCS

## 2020-10-10 PROCEDURE — 70450 CT HEAD/BRAIN W/O DYE: CPT

## 2020-10-10 PROCEDURE — 73502 X-RAY EXAM HIP UNI 2-3 VIEWS: CPT

## 2020-10-10 PROCEDURE — 250N000011 HC RX IP 250 OP 636: Performed by: FAMILY MEDICINE

## 2020-10-10 PROCEDURE — 96375 TX/PRO/DX INJ NEW DRUG ADDON: CPT | Performed by: EMERGENCY MEDICINE

## 2020-10-10 PROCEDURE — 72125 CT NECK SPINE W/O DYE: CPT

## 2020-10-10 PROCEDURE — 250N000011 HC RX IP 250 OP 636: Performed by: EMERGENCY MEDICINE

## 2020-10-10 PROCEDURE — U0003 INFECTIOUS AGENT DETECTION BY NUCLEIC ACID (DNA OR RNA); SEVERE ACUTE RESPIRATORY SYNDROME CORONAVIRUS 2 (SARS-COV-2) (CORONAVIRUS DISEASE [COVID-19]), AMPLIFIED PROBE TECHNIQUE, MAKING USE OF HIGH THROUGHPUT TECHNOLOGIES AS DESCRIBED BY CMS-2020-01-R: HCPCS | Performed by: EMERGENCY MEDICINE

## 2020-10-10 PROCEDURE — 96375 TX/PRO/DX INJ NEW DRUG ADDON: CPT

## 2020-10-10 PROCEDURE — 258N000003 HC RX IP 258 OP 636: Performed by: EMERGENCY MEDICINE

## 2020-10-10 PROCEDURE — 96374 THER/PROPH/DIAG INJ IV PUSH: CPT | Performed by: EMERGENCY MEDICINE

## 2020-10-10 PROCEDURE — 250N000013 HC RX MED GY IP 250 OP 250 PS 637: Performed by: EMERGENCY MEDICINE

## 2020-10-10 PROCEDURE — 99220 PR INITIAL OBSERVATION CARE,LEVEL III: CPT | Performed by: FAMILY MEDICINE

## 2020-10-10 PROCEDURE — 96361 HYDRATE IV INFUSION ADD-ON: CPT | Performed by: EMERGENCY MEDICINE

## 2020-10-10 PROCEDURE — 72192 CT PELVIS W/O DYE: CPT

## 2020-10-10 PROCEDURE — 99285 EMERGENCY DEPT VISIT HI MDM: CPT | Mod: 25 | Performed by: EMERGENCY MEDICINE

## 2020-10-10 PROCEDURE — 81003 URINALYSIS AUTO W/O SCOPE: CPT | Performed by: EMERGENCY MEDICINE

## 2020-10-10 PROCEDURE — L0637 LSO SC R ANT/POS PNL PRE CST: HCPCS

## 2020-10-10 PROCEDURE — C9803 HOPD COVID-19 SPEC COLLECT: HCPCS | Performed by: EMERGENCY MEDICINE

## 2020-10-10 PROCEDURE — 96376 TX/PRO/DX INJ SAME DRUG ADON: CPT

## 2020-10-10 PROCEDURE — 80048 BASIC METABOLIC PNL TOTAL CA: CPT | Performed by: EMERGENCY MEDICINE

## 2020-10-10 RX ORDER — DULOXETIN HYDROCHLORIDE 30 MG/1
60 CAPSULE, DELAYED RELEASE ORAL DAILY
Status: DISCONTINUED | OUTPATIENT
Start: 2020-10-10 | End: 2020-10-12 | Stop reason: HOSPADM

## 2020-10-10 RX ORDER — ONDANSETRON 2 MG/ML
4 INJECTION INTRAMUSCULAR; INTRAVENOUS
Status: DISCONTINUED | OUTPATIENT
Start: 2020-10-10 | End: 2020-10-10

## 2020-10-10 RX ORDER — CARBIDOPA AND LEVODOPA 25; 100 MG/1; MG/1
2 TABLET ORAL 3 TIMES DAILY
Status: DISCONTINUED | OUTPATIENT
Start: 2020-10-10 | End: 2020-10-12 | Stop reason: HOSPADM

## 2020-10-10 RX ORDER — HYDROCHLOROTHIAZIDE 25 MG/1
25 TABLET ORAL DAILY
Status: DISCONTINUED | OUTPATIENT
Start: 2020-10-10 | End: 2020-10-12 | Stop reason: HOSPADM

## 2020-10-10 RX ORDER — OXYCODONE HYDROCHLORIDE 5 MG/1
5 TABLET ORAL
Status: DISCONTINUED | OUTPATIENT
Start: 2020-10-10 | End: 2020-10-12 | Stop reason: HOSPADM

## 2020-10-10 RX ORDER — PANTOPRAZOLE SODIUM 20 MG/1
20 TABLET, DELAYED RELEASE ORAL DAILY
Status: DISCONTINUED | OUTPATIENT
Start: 2020-10-10 | End: 2020-10-12 | Stop reason: HOSPADM

## 2020-10-10 RX ORDER — ONDANSETRON 4 MG/1
4 TABLET, ORALLY DISINTEGRATING ORAL EVERY 6 HOURS PRN
Status: DISCONTINUED | OUTPATIENT
Start: 2020-10-10 | End: 2020-10-10

## 2020-10-10 RX ORDER — HALOPERIDOL 5 MG/ML
2 INJECTION INTRAMUSCULAR ONCE
Status: DISCONTINUED | OUTPATIENT
Start: 2020-10-10 | End: 2020-10-10 | Stop reason: CLARIF

## 2020-10-10 RX ORDER — NALOXONE HYDROCHLORIDE 0.4 MG/ML
.1-.4 INJECTION, SOLUTION INTRAMUSCULAR; INTRAVENOUS; SUBCUTANEOUS
Status: DISCONTINUED | OUTPATIENT
Start: 2020-10-10 | End: 2020-10-12 | Stop reason: HOSPADM

## 2020-10-10 RX ORDER — SODIUM CHLORIDE 9 MG/ML
1000 INJECTION, SOLUTION INTRAVENOUS CONTINUOUS
Status: DISCONTINUED | OUTPATIENT
Start: 2020-10-10 | End: 2020-10-12 | Stop reason: HOSPADM

## 2020-10-10 RX ORDER — FENTANYL CITRATE 50 UG/ML
25 INJECTION, SOLUTION INTRAMUSCULAR; INTRAVENOUS
Status: DISCONTINUED | OUTPATIENT
Start: 2020-10-10 | End: 2020-10-12 | Stop reason: HOSPADM

## 2020-10-10 RX ORDER — BISACODYL 10 MG
10 SUPPOSITORY, RECTAL RECTAL DAILY PRN
Status: DISCONTINUED | OUTPATIENT
Start: 2020-10-10 | End: 2020-10-12 | Stop reason: HOSPADM

## 2020-10-10 RX ORDER — OXYCODONE AND ACETAMINOPHEN 5; 325 MG/1; MG/1
1 TABLET ORAL
Status: COMPLETED | OUTPATIENT
Start: 2020-10-10 | End: 2020-10-10

## 2020-10-10 RX ORDER — HALOPERIDOL 5 MG/ML
2 INJECTION INTRAMUSCULAR ONCE
Status: COMPLETED | OUTPATIENT
Start: 2020-10-10 | End: 2020-10-10

## 2020-10-10 RX ORDER — PROCHLORPERAZINE 25 MG
12.5 SUPPOSITORY, RECTAL RECTAL EVERY 12 HOURS PRN
Status: DISCONTINUED | OUTPATIENT
Start: 2020-10-10 | End: 2020-10-12 | Stop reason: HOSPADM

## 2020-10-10 RX ORDER — ONDANSETRON 2 MG/ML
4 INJECTION INTRAMUSCULAR; INTRAVENOUS EVERY 6 HOURS PRN
Status: DISCONTINUED | OUTPATIENT
Start: 2020-10-10 | End: 2020-10-10

## 2020-10-10 RX ORDER — POLYETHYLENE GLYCOL 3350 17 G/17G
17 POWDER, FOR SOLUTION ORAL DAILY
Status: DISCONTINUED | OUTPATIENT
Start: 2020-10-10 | End: 2020-10-12 | Stop reason: HOSPADM

## 2020-10-10 RX ORDER — LEVOTHYROXINE SODIUM 100 UG/1
100 TABLET ORAL DAILY
Status: DISCONTINUED | OUTPATIENT
Start: 2020-10-10 | End: 2020-10-12 | Stop reason: HOSPADM

## 2020-10-10 RX ORDER — NALOXONE HYDROCHLORIDE 0.4 MG/ML
.1-.4 INJECTION, SOLUTION INTRAMUSCULAR; INTRAVENOUS; SUBCUTANEOUS
Status: DISCONTINUED | OUTPATIENT
Start: 2020-10-10 | End: 2020-10-10

## 2020-10-10 RX ORDER — BISACODYL 5 MG
5 TABLET, DELAYED RELEASE (ENTERIC COATED) ORAL DAILY
Status: DISCONTINUED | OUTPATIENT
Start: 2020-10-10 | End: 2020-10-12 | Stop reason: HOSPADM

## 2020-10-10 RX ORDER — ONDANSETRON 2 MG/ML
4 INJECTION INTRAMUSCULAR; INTRAVENOUS EVERY 6 HOURS PRN
Status: DISCONTINUED | OUTPATIENT
Start: 2020-10-10 | End: 2020-10-12 | Stop reason: HOSPADM

## 2020-10-10 RX ORDER — ONDANSETRON 4 MG/1
4 TABLET, ORALLY DISINTEGRATING ORAL EVERY 6 HOURS PRN
Status: DISCONTINUED | OUTPATIENT
Start: 2020-10-10 | End: 2020-10-12 | Stop reason: HOSPADM

## 2020-10-10 RX ORDER — HYDROMORPHONE HYDROCHLORIDE 1 MG/ML
.3-.5 INJECTION, SOLUTION INTRAMUSCULAR; INTRAVENOUS; SUBCUTANEOUS
Status: DISCONTINUED | OUTPATIENT
Start: 2020-10-10 | End: 2020-10-10

## 2020-10-10 RX ORDER — LOSARTAN POTASSIUM 50 MG/1
50 TABLET ORAL DAILY
Status: DISCONTINUED | OUTPATIENT
Start: 2020-10-10 | End: 2020-10-12 | Stop reason: HOSPADM

## 2020-10-10 RX ORDER — ACETAMINOPHEN 500 MG
1000 TABLET ORAL 3 TIMES DAILY
Status: DISCONTINUED | OUTPATIENT
Start: 2020-10-10 | End: 2020-10-12 | Stop reason: HOSPADM

## 2020-10-10 RX ORDER — BUSPIRONE HYDROCHLORIDE 5 MG/1
5 TABLET ORAL 2 TIMES DAILY PRN
Status: DISCONTINUED | OUTPATIENT
Start: 2020-10-10 | End: 2020-10-12 | Stop reason: HOSPADM

## 2020-10-10 RX ORDER — GABAPENTIN 300 MG/1
300 CAPSULE ORAL 3 TIMES DAILY
Status: DISCONTINUED | OUTPATIENT
Start: 2020-10-10 | End: 2020-10-12 | Stop reason: HOSPADM

## 2020-10-10 RX ORDER — PROCHLORPERAZINE MALEATE 5 MG
5 TABLET ORAL EVERY 6 HOURS PRN
Status: DISCONTINUED | OUTPATIENT
Start: 2020-10-10 | End: 2020-10-12 | Stop reason: HOSPADM

## 2020-10-10 RX ORDER — CARBOXYMETHYLCELLULOSE SODIUM 5 MG/ML
2 SOLUTION/ DROPS OPHTHALMIC 3 TIMES DAILY
Status: DISCONTINUED | OUTPATIENT
Start: 2020-10-10 | End: 2020-10-12 | Stop reason: HOSPADM

## 2020-10-10 RX ADMIN — HALOPERIDOL LACTATE 2 MG: 5 INJECTION, SOLUTION INTRAMUSCULAR at 18:51

## 2020-10-10 RX ADMIN — PANTOPRAZOLE SODIUM 20 MG: 20 TABLET, DELAYED RELEASE ORAL at 12:26

## 2020-10-10 RX ADMIN — ACETAMINOPHEN 1000 MG: 500 TABLET, FILM COATED ORAL at 20:30

## 2020-10-10 RX ADMIN — CARBIDOPA AND LEVODOPA 2 TABLET: 25; 100 TABLET ORAL at 13:09

## 2020-10-10 RX ADMIN — BISACODYL 5 MG: 5 TABLET, COATED ORAL at 12:26

## 2020-10-10 RX ADMIN — OXYCODONE HYDROCHLORIDE 5 MG: 5 TABLET ORAL at 21:18

## 2020-10-10 RX ADMIN — OXYCODONE HYDROCHLORIDE 5 MG: 5 TABLET ORAL at 17:47

## 2020-10-10 RX ADMIN — CARBOXYMETHYLCELLULOSE SODIUM 2 DROP: 5 SOLUTION/ DROPS OPHTHALMIC at 13:09

## 2020-10-10 RX ADMIN — POLYETHYLENE GLYCOL 3350 17 G: 17 POWDER, FOR SOLUTION ORAL at 12:27

## 2020-10-10 RX ADMIN — OXYCODONE HYDROCHLORIDE AND ACETAMINOPHEN 1 TABLET: 5; 325 TABLET ORAL at 03:39

## 2020-10-10 RX ADMIN — FENTANYL CITRATE 25 MCG: 50 INJECTION INTRAMUSCULAR; INTRAVENOUS at 01:27

## 2020-10-10 RX ADMIN — DULOXETINE HYDROCHLORIDE 60 MG: 30 CAPSULE, DELAYED RELEASE ORAL at 12:26

## 2020-10-10 RX ADMIN — SODIUM CHLORIDE 500 ML: 9 INJECTION, SOLUTION INTRAVENOUS at 01:25

## 2020-10-10 RX ADMIN — ACETAMINOPHEN 1000 MG: 500 TABLET, FILM COATED ORAL at 13:09

## 2020-10-10 RX ADMIN — GABAPENTIN 300 MG: 300 CAPSULE ORAL at 20:30

## 2020-10-10 RX ADMIN — BUSPIRONE HYDROCHLORIDE 5 MG: 5 TABLET ORAL at 12:31

## 2020-10-10 RX ADMIN — CARBOXYMETHYLCELLULOSE SODIUM 2 DROP: 5 SOLUTION/ DROPS OPHTHALMIC at 20:30

## 2020-10-10 RX ADMIN — HYDROMORPHONE HYDROCHLORIDE 0.5 MG: 1 INJECTION, SOLUTION INTRAMUSCULAR; INTRAVENOUS; SUBCUTANEOUS at 10:54

## 2020-10-10 RX ADMIN — GABAPENTIN 300 MG: 300 CAPSULE ORAL at 13:09

## 2020-10-10 RX ADMIN — FENTANYL CITRATE 25 MCG: 50 INJECTION INTRAMUSCULAR; INTRAVENOUS at 02:13

## 2020-10-10 RX ADMIN — LEVOTHYROXINE SODIUM 100 MCG: 100 TABLET ORAL at 12:27

## 2020-10-10 RX ADMIN — CARBIDOPA AND LEVODOPA 2 TABLET: 25; 100 TABLET ORAL at 20:30

## 2020-10-10 RX ADMIN — HYDROCHLOROTHIAZIDE 25 MG: 25 TABLET ORAL at 12:26

## 2020-10-10 RX ADMIN — HYDROMORPHONE HYDROCHLORIDE 0.5 MG: 1 INJECTION, SOLUTION INTRAMUSCULAR; INTRAVENOUS; SUBCUTANEOUS at 09:13

## 2020-10-10 RX ADMIN — LOSARTAN POTASSIUM 50 MG: 50 TABLET, FILM COATED ORAL at 12:26

## 2020-10-10 RX ADMIN — OXYCODONE HYDROCHLORIDE 5 MG: 5 TABLET ORAL at 12:26

## 2020-10-10 RX ADMIN — SODIUM CHLORIDE 1000 ML: 9 INJECTION, SOLUTION INTRAVENOUS at 05:35

## 2020-10-10 RX ADMIN — HYDROMORPHONE HYDROCHLORIDE 0.5 MG: 1 INJECTION, SOLUTION INTRAMUSCULAR; INTRAVENOUS; SUBCUTANEOUS at 05:50

## 2020-10-10 ASSESSMENT — ENCOUNTER SYMPTOMS
ALLERGIC/IMMUNOLOGIC NEGATIVE: 1
PSYCHIATRIC NEGATIVE: 1
RESPIRATORY NEGATIVE: 1
GASTROINTESTINAL NEGATIVE: 1
CARDIOVASCULAR NEGATIVE: 1
ENDOCRINE NEGATIVE: 1
NEUROLOGICAL NEGATIVE: 1
HEMATOLOGIC/LYMPHATIC NEGATIVE: 1
EYES NEGATIVE: 1

## 2020-10-10 ASSESSMENT — MIFFLIN-ST. JEOR: SCORE: 1180.75

## 2020-10-10 NOTE — PLAN OF CARE
Patient is alert and oriented, forgetful at times. Is able to help turn from side to side using the siderails. Pain increases with minimal movement. Medicated with IV dilaudid 0.5 mg, patient still intermittently calls out in pain. Asking to get out of bed. Reoriented to plan of care. Waiting for TLSO brace to be fitted later this afternoon before getting out of bed. Home med rec completed with assistance from Charity at [facility] via phone. Charity reported pts behavior goes from one extreme to the next per usual with some new hallucinations though.

## 2020-10-10 NOTE — CONSULTS
Care Management Initial Consult    General Information  Assessment completed with:: Irasema Jhaveri     Primary Care Provider verified and updated as needed?: Yes  Readmission Within the Last 30 Days: no previous admission in last 30 days        Advance Care Planning: Advance Care Planning Reviewed: no concerns identified          Communication Assessment  Patient's communication style: spoken language (English or Bilingual)  Hearing Difficulty or Deaf: no Wear Glasses or Blind: no    Cognitive  Cognitive/Neuro/Behavioral: .WDL except, orientation  Level of Consciousness: alert     Orientation: oriented x 4  Mood/Behavior: calm, cooperative  Best Language: 0 - No aphasia  Speech: clear, spontaneous, logical    Living Environment:   People in home: alone     Current living Arrangements: assisted living  Name of Facility: [facility]       Family/Social Support:  Care provided by: other (see comments)([facility] Staff)  Provides care for: no one     Who is your support system?: Children, Facility resident(s)/Staff     Description of Support System: Supportive, Involved  Description of Support System: Supportive, Involved  Adequate family and caregiver support        Description of Support System: Supportive, Involved  Support Assessment: Adequate family and caregiver support    Current Resources:      Equipment currently used at home: walker, rolling          Lifestyle & Psychosocial Needs:        Socioeconomic History     Marital status:      Spouse name: Not on file     Number of children: Not on file     Years of education: Not on file     Highest education level: Not on file     Tobacco Use     Smoking status: Former Smoker     Packs/day: 0.50     Types: Cigarettes     Quit date: 1994     Years since quittin.8     Smokeless tobacco: Never Used   Substance and Sexual Activity     Alcohol use: No     Drug use: No     Sexual activity: Not Currently       Mental Health Status:  WDL with intermittent  confusion and occasional hallucinations.                       Additional Information:    Spoke with the patient's daughter Irasema regarding discharge planning.    The patient lives at [facility] (phone: [phone] Fax: [phone]).  Irasema reports the patient receives assistance with meals, showers, laundry, dressing and medications.  The patient ambulates with a walker.  She has intermittent confusion and occasional hallucinations.    The patient lost her footing fell and presents with hip pain and low back pain.  Discussed TCU.  Pt/family was provided with the Medicare Compare list for SNF.  Discussed associated Medicare star ratings to assist with choice for referrals/discharge planning Yes  Education was given to pt/family that star ratings are updated/maintained by Medicare and can be reviewed by visiting www.medicare.gov Yes.  Family prefers [facility] Phone (Main Phone:[phone] Admissions Phone:[phone] Fax: [phone]), [facility] (Phone: [phone] Fax: [phone]) or [facility] (Main: [phone] Admissions: [phone] Fax: [phone]).  TCU referrals are pending.  Transportation will be provided by daughter Irasema upon discharge.    Plan:    Return to [facility] vs TCU      Amanda Tanner RN

## 2020-10-10 NOTE — PLAN OF CARE
"AOx4, anxious regarding pain and requesting to wear shoes as she \"needs them\" if she needs to get up. Patient reassured regarding shoes and that she will be comfortable in bed until fitted for TLSO brace. Dilaudid 0.5mg x 1 with partial relief, increased pain with movement and repositions, rolls more easily onto right side. NS @ 125ml/hr, incontinent/bedpan void. Currently resting comfortably.  "

## 2020-10-10 NOTE — H&P
[facility]     History and Physical - Hospitalist Service       Date of Admission:  10/10/2020    Assessment & Plan   Mehreen Camara is a 80 year old female admitted on 10/10/2020.     L4 compression fracture  -This occurred after mechanical fall  - Per Ortho they would like her to have a TLSO brace which will be fitted tonight and apply applied tomorrow.  She is to wear this F at all times unless she is in bed.  Tylenol thousand milligrams 3 times daily plus oxycodone 5 mg every 3 hours as needed pain  From a mental standpoint she is going to tolerate this poorly.  Is needing significant assistance at this time.  -She is to follow with Dr. Ford in 2 to 3 weeks as an outpatient    Hypertension  -Blood pressures have been running very high here but now below's 170 systolic.  - No change in her blood pressure medications at this time.  Continue losartan and HCTZ    Anxiety/depression  Continue her duloxetine and BuSpar  - She is quite anxious here and can be extremely demanding.  She is generally redirectable    Mild cognitive impairment versus mirta Alzheimer's dementia  -She  certainly has some confusion here, though she knows the date, day, where she lives, but cannot name this hospital.  - OT to do slums eval    Aortic stenosis  -This is mild per echo in 2018, no murmurs or reason to suspect it has gotten significantly worse       Diet: Regular Diet Adult    DVT Prophylaxis: Low Risk/Ambulatory with no VTE prophylaxis indicated  Diaz Catheter: not present  Code Status: No CPR- Do NOT Intubate           Disposition Plan   Expected discharge: 2 - 3 days, recommended to transitional care unit once adequate pain management/ tolerating PO medications.  Entered: Cameron Langley MD 10/10/2020, 1:32 PM     The patient's care was discussed with the Patient.    Cameron Langley MD  [facility]   Contact information available via [facility]  Paging/Directory      ______________________________________________________________________    Chief Complaint   Fall,hip pain     History is obtained from the patient    History of Present Illness   Mehreen Camara is a 80 year old female who  lost her footing fell and presents with hip pain and low back pain.  She reports a prior history of right hip arthroplasty about 17 years prior.  Patient reports no headache and no neck pain.  She has no abdominal pain and flank pain.     Review of Systems    The 10 point Review of Systems is negative other than noted in the HPI or here.   She does have chronic constipation and bowel issues including marked flatulence and crampy abdominal pain  She has some bladder irritability  She can be extremely anxious and complain and even call out per her nurses      Past Medical History    I have reviewed this patient's medical history and updated it with pertinent information if needed.   Past Medical History:   Diagnosis Date     Adrenal adenoma     stable, thought not to be hormonally active     Arthritis      Depressive disorder      Hypertension      Multiple system atrophy P (H)      Rheumatic fever     thought to have had as a child     Small bowel obstruction (H)      Thyroid disease        Past Surgical History   I have reviewed this patient's surgical history and updated it with pertinent information if needed.  Past Surgical History:   Procedure Laterality Date     AAA REPAIR  2015    wtih bifurcation graft     CARPAL TUNNEL RELEASE RT/LT Bilateral 2013     HYSTERECTOMY  2013     JOINT REPLACEMENT Right 2003     LAPAROSCOPIC CHOLECYSTECTOMY N/A 12/12/2018    Procedure: LAPAROSCOPIC CHOLECYSTECTOMY;  Surgeon: Amadeo Sebastian MD;  Location: [facility] OR     ORTHOPEDIC SURGERY       SPINE SURGERY  1981    cervical spine fusion     THYROIDECTOMY  2011       Social History   I have reviewed this patient's social history and updated it with pertinent information if needed.  Social  History     Tobacco Use     Smoking status: Former Smoker     Packs/day: 0.50     Types: Cigarettes     Quit date: 1994     Years since quittin.8     Smokeless tobacco: Never Used   Substance Use Topics     Alcohol use: No     Drug use: No       Family History   I have reviewed this patient's family history and updated it with pertinent information if needed.  Family History   Problem Relation Age of Onset     Hypertension Mother      Coronary Artery Disease Mother      Coronary Artery Disease Father      Other Cancer Brother      Parkinsonism Other        Prior to Admission Medications   Prior to Admission Medications   Prescriptions Last Dose Informant Patient Reported? Taking?   DULoxetine (CYMBALTA) 20 MG capsule 10/9/2020 at 0600  Yes Yes   Sig: Take 60 mg by mouth daily   GAS RELIEF 125 MG CAPS Unknown at Unknown time  No No   Sig: TAKE 1 CAPSULE BY MOUTH FOUR TIMES DAILY AS NEEDED WITH MEALS   HYDROcodone-acetaminophen (NORCO) 5-325 MG tablet 10/9/2020 at 1113  No Yes   Sig: TAKE 1 TABLET BY MOUTH EVERY 6 HOURS AS NEEDED FOR PAIN (MAX APAP 4GM/24HRS)   Polyethylene Glycol 3350 (MIRALAX PO) 10/9/2020 at 0600  Yes Yes   Sig: Take 1 capful by mouth daily 17 GM   VITAMIN D3 25 MCG (1000 UT) tablet 10/9/2020 at 0600  No Yes   Sig: TAKE 2 TABLETS (2000IU) BY MOUTH EVERY DAY DX OSTEOPOROSIS   Zinc Oxide 13 % CREA  at Unknown time  Yes Yes   Sig: Apply topically daily    acetaminophen (TYLENOL) 500 MG tablet 10/9/2020 at 1745  Yes Yes   Sig: Take 500 mg by mouth 3 times daily    bisacodyl (DULCOLAX) 10 MG suppository 2020 at Unknown time  Yes Yes   Sig: Place 10 mg rectally daily as needed for constipation   bisacodyl (DULCOLAX) 5 MG EC tablet 10/9/2020 at 0600  Yes Yes   Sig: Take 5 mg by mouth daily (and a second time if needed)   busPIRone (BUSPAR) 5 MG tablet More than a month at Unknown time  Yes No   Sig: Take 5 mg by mouth 2 times daily as needed for anxiety   calcium carbonate (TUMS) 500 MG  chewable tablet More than a month at Unknown time Nursing Home No No   Sig: Take 1 tablet (500 mg) by mouth every 2 hours as needed for heartburn   carbidopa-levodopa (SINEMET)  MG tablet 10/9/2020 at 1745  No Yes   Sig: TAKE 2 TABLETS BY MOUTH THREE TIMES DAILY   clindamycin (CLEOCIN) 300 MG capsule Unknown at Unknown time  Yes No   Sig: Take 600 mg by mouth as needed Give 1 hour prior to dental appoitments   clotrimazole (LOTRIMIN) 1 % external cream Unknown at Unknown time  Yes No   Sig: Apply topically 2 times daily as needed   diclofenac (VOLTAREN) 1 % topical gel More than a month at Unknown time Nursing Home No No   Sig: Apply 4 g topically 4 times daily as needed for moderate pain (to back and neck)   gabapentin (NEURONTIN) 100 MG capsule 10/9/2020 at 1745  Yes Yes   Sig: Take 300 mg by mouth 3 times daily    hydrochlorothiazide (HYDRODIURIL) 25 MG tablet 10/9/2020 at 0600  No Yes   Sig: TAKE 1 TABLET BY MOUTH EVERY DAY DX HYPERTENSION AND LE EDEMA   levothyroxine (SYNTHROID/LEVOTHROID) 100 MCG tablet 10/9/2020 at 0600  No Yes   Sig: Take 1 tablet (100 mcg) by mouth daily   losartan (COZAAR) 50 MG tablet 10/9/2020 at 0600 Nursing Home No Yes   Sig: TAKE TABLET BY MOUTH EVERY MORNING   magnesium citrate solution   Yes No   Sig: Take 296 mLs by mouth as needed for other   pantoprazole (PROTONIX) 20 MG EC tablet 10/9/2020 at 0600  Yes Yes   Sig: Take 20 mg by mouth daily   polyethylene glycol-propylene glycol (SYSTANE ULTRA) 0.4-0.3 % SOLN ophthalmic solution 10/9/2020 at 1745  Yes Yes   Sig: Place 2 drops into both eyes 3 times daily (and additionally as needed/requested)   vitamin D3 (CHOLECALCIFEROL) 2000 units (50 mcg) tablet   Yes Yes   Sig: Take 2,000 Units by mouth daily      Facility-Administered Medications: None     Allergies   Allergies   Allergen Reactions     Penicillins Anaphylaxis, Rash and Shortness Of Breath     Aspirin Nausea and GI Disturbance     Atenolol Cough     Atorvastatin Muscle  Pain (Myalgia) and Nausea and Vomiting     Bupropion Nausea     Clindamycin Other (See Comments)     Constipation      Codeine Nausea     Ibuprofen Nausea     Stomach upset     Lovastatin Muscle Pain (Myalgia)     Cephalexin Rash     Neck reddness       Physical Exam   Vital Signs: Temp: 97.8  F (36.6  C) Temp src: Oral BP: (!) 169/81 Pulse: 80   Resp: 16 SpO2: 94 % O2 Device: None (Room air)    Weight: 152 lbs 15.99 oz    EXAM:      GENERAL APPEARANCE: Anxious, oriented x2, alert and no distress     HENT: ear canals and TM's without perforation, bulging, or retraction. Nose and mouth without ulceration or lesions     NECK: no adenopathy, no asymmetry, masses, or scars and thyroid without enlargement or nodules to palpation     RESP: lungs clear to auscultation - no rales, rhonchi or wheezes     CV: regular rates and rhythm, normal S1 S2, no S3 or S4 and 2/6 systolic murmur, no click or rub      Abdomen: soft, nontender, no liver or spleen enlargement, no masses, bowel sounds active     MS: extremities normal- no gross deformities noted, no evidence of inflammation in joints, full ROM in all extremities.  Marked pain in the low back when she is rolled or moved her when I try to lift her legs.     SKIN: no suspicious lesions or rashes     LYMPHATICS: No axillary, cervical, inguinal, or supraclavicular nodes   NEURO: Normal strength and tone, sensory exam grossly normal, mentation intact and speech normal     PSYCH: affect normal/bright          Data   Data reviewed today: I reviewed all medications, new labs and imaging results over the last 24 hours. I personally reviewed   Recent Results (from the past 24 hour(s))   Head CT w/o contrast    Narrative    EXAM: CT HEAD W/O CONTRAST, CT CERVICAL SPINE W/O CONTRAST  LOCATION: [facility]  DATE/TIME: 10/10/2020 1:48 AM    INDICATION: Head and neck injury  COMPARISON: 04/06/2020  TECHNIQUE:   HEAD CT: Without IV contrast. Multiplanar reformats. Dose reduction  techniques were used.  CERVICAL SPINE CT: Routine without IV contrast. Multiplanar reformats. Dose reduction techniques were used.    FINDINGS:   HEAD CT:   INTRACRANIAL CONTENTS: No intracranial hemorrhage, extraaxial collection, or mass effect.  No CT evidence of acute infarct. Mild to moderate presumed chronic small vessel ischemic changes. Mild generalized volume loss. No hydrocephalus.     VISUALIZED ORBITS/SINUSES/MASTOIDS: No intraorbital abnormality. No paranasal sinus mucosal disease. No middle ear or mastoid effusion.    BONES/SOFT TISSUES: No acute abnormality.    CERVICAL SPINE CT:   VERTEBRA: Redemonstration of interbody ankylosis from C5 through C7. There are stable alterations in the lateral alignment. Stable nonspecific lucency within the C5 vertebral body. No fracture or posttraumatic subluxation.     CANAL/FORAMINA: Advanced interbody degeneration at the C4-C5 level. Mild central canal stenosis at this level. High-grade foraminal narrowing on the left at C3-C4 and C4-C5 and on the right at C4-C5.    PARASPINAL: No extraspinal abnormality. Visualized lung fields are clear.      Impression    IMPRESSION:  HEAD CT:  1.  No acute intracranial hemorrhage or calvarial fracture.  2.  Mild to moderate age-related changes.    CERVICAL SPINE CT:  1.  No CT evidence for acute fracture or post traumatic subluxation.  2.  Degenerative changes as above.   Cervical spine CT w/o contrast    Narrative    EXAM: CT HEAD W/O CONTRAST, CT CERVICAL SPINE W/O CONTRAST  LOCATION: [facility]  DATE/TIME: 10/10/2020 1:48 AM    INDICATION: Head and neck injury  COMPARISON: 04/06/2020  TECHNIQUE:   HEAD CT: Without IV contrast. Multiplanar reformats. Dose reduction techniques were used.  CERVICAL SPINE CT: Routine without IV contrast. Multiplanar reformats. Dose reduction techniques were used.    FINDINGS:   HEAD CT:   INTRACRANIAL CONTENTS: No intracranial hemorrhage, extraaxial collection, or mass effect.  No  CT evidence of acute infarct. Mild to moderate presumed chronic small vessel ischemic changes. Mild generalized volume loss. No hydrocephalus.     VISUALIZED ORBITS/SINUSES/MASTOIDS: No intraorbital abnormality. No paranasal sinus mucosal disease. No middle ear or mastoid effusion.    BONES/SOFT TISSUES: No acute abnormality.    CERVICAL SPINE CT:   VERTEBRA: Redemonstration of interbody ankylosis from C5 through C7. There are stable alterations in the lateral alignment. Stable nonspecific lucency within the C5 vertebral body. No fracture or posttraumatic subluxation.     CANAL/FORAMINA: Advanced interbody degeneration at the C4-C5 level. Mild central canal stenosis at this level. High-grade foraminal narrowing on the left at C3-C4 and C4-C5 and on the right at C4-C5.    PARASPINAL: No extraspinal abnormality. Visualized lung fields are clear.      Impression    IMPRESSION:  HEAD CT:  1.  No acute intracranial hemorrhage or calvarial fracture.  2.  Mild to moderate age-related changes.    CERVICAL SPINE CT:  1.  No CT evidence for acute fracture or post traumatic subluxation.  2.  Degenerative changes as above.   CT Pelvis Bone wo Contrast    Narrative    EXAM: CT PELVIS BONE WO CONTRAST  LOCATION: [facility]  DATE/TIME: 10/10/2020 1:48 AM    INDICATION: Unwitnessed fall, left hip pain  COMPARISON: 05/16/2020  TECHNIQUE: CT scan of the pelvis was performed without IV contrast. Multiplanar reformats were obtained. Dose reduction techniques were used.  CONTRAST: None.    FINDINGS:    PELVIS ORGANS: Atherosclerotic vascular disease. Large volume retained feces in the rectal vault, correlate for fecal impaction. Normal appendix. No obstruction. Absent uterus. Aortoiliac stent graft.    MUSCULOSKELETAL: Diffuse osteopenia. Status post right total hip arthroplasty. No acute fracture or dislocation. No widening of the sacroiliac joints.      Impression    IMPRESSION:  1.  No displaced fracture or  dislocation.  2.  Osteopenia.  3.  Right total hip arthroplasty.   Lumbar spine CT w/o contrast    Narrative    EXAM: CT LUMBAR SPINE W/O CONTRAST  LOCATION: [facility]  DATE/TIME: 10/10/2020 1:48 AM    INDICATION: Back injury with left hip pain.  COMPARISON: 05/16/2020  TECHNIQUE: Routine without IV contrast. Multiplanar reformats.  Dose reduction techniques were used.     FINDINGS:  VERTEBRA: There is a prominent levoconvex curve with an apex at L3-L4. At the L3-L4 level, again demonstrated are large Schmorl's nodes along the opposing endplates. These have enlarged compared with the previous study. In addition, there is a   questionable linear lucency along the anterior aspect of the superior L4 endplate which was not definitely present on the previous study. Additionally, the buckling of the posterior cortex along superior endplate at L4 appears more prominent than it   previously did. Broad-based Schmorl's node along the superior endplate at L5 appears grossly unchanged. The L1 and L2 vertebral bodies are normal in height.     CANAL/FORAMINA: Underlying advanced multilevel degenerative changes. There is moderate to severe central canal stenosis at the L3-L4 level. There is high-grade foraminal narrowing on the right at L2-L3 and L3-L4 and on the left at L4-L5.    PARASPINAL: Aortic endovascular graft with bilateral iliac stents.      Impression    IMPRESSION:  1.  There is evidence of a recent superior endplate compression fracture along the superior endplate at L4 superimposed upon a broad-based Schmorl's node which has worsened from the previous study.  2.  Underlying advanced multilevel degenerative changes as highlighted above.    NOTE: ABNORMAL REPORT    THE DICTATION ABOVE DESCRIBES AN ABNORMALITY FOR WHICH FOLLOWUP IS NEEDED.    X-ray Pelvis w/ unilateral hip right    Narrative    PELVIS AND HIP RIGHT ONE VIEW October 10, 2020 11:19 AM     HISTORY: Fall and right hip pain status post right  total hip  arthroplasty. Need to visualize entire implant not seen on CT.    COMPARISON: X-ray from 8/5/2020.      Impression    IMPRESSION: Right total hip arthroplasty in place. No evidence of  fracture or loosening. Left hip joint space appears preserved. No  fracture or subluxation. Aortoiliac stenting identified.    DEREK GODOY MD     .  CMP  Recent Labs   Lab 10/10/20  0415      POTASSIUM 3.5   CHLORIDE 104   CO2 32   ANIONGAP 4   *   BUN 15   CR 0.55   GFRESTIMATED 88   GFRESTBLACK >90   YADIRA 8.4*     CBC  Recent Labs   Lab 10/10/20  0415   WBC 5.3   RBC 4.32   HGB 13.5   HCT 41.6   MCV 96   MCH 31.3   MCHC 32.5   RDW 11.4

## 2020-10-10 NOTE — ED TRIAGE NOTES
"Pt resides in [facility] living and was up to \" go to the bathroom and I was on my way back and hit the wheel on my walker and fell. \" Hx of right hip replacement 17 years ago.   "

## 2020-10-10 NOTE — PROGRESS NOTES
"[facility] ADMISSION NOTE    Patient admitted to room 2301 at approximately 0515 via cart from emergency room. Patient was accompanied by transport tech.     Verbal SBAR report received from BOBBY Pearson prior to patient arrival.     Patient trasferred to bed via air frederick. Patient alert and oriented X 3. Pain is controlled with current analgesics.  Medication(s) being used: narcotic analgesics including dilaudid. 0-10 Pain Scale: 8. Admission vital signs: Blood pressure (!) 198/88, pulse 77, temperature 98.3  F (36.8  C), temperature source Oral, resp. rate 16, height 1.676 m (5' 6\"), weight 69.4 kg (153 lb), SpO2 98 %, not currently breastfeeding. Patient was oriented to plan of care, call light, bed controls, tv, telephone, bathroom and visiting hours.     Risk Assessment    The following safety risks were identified during admission: fall, skin and isolation. Yellow risk band applied: YES.     Skin Initial Assessment    This writer admitted this patient and completed a full skin assessment and Adal score in the Adult PCS flowsheet. Appropriate interventions initiated as needed.     Secondary skin check completed by BOBBY Last.    Adal Risk Assessment  Sensory Perception: 3-->slightly limited  Moisture: 3-->occasionally moist  Activity: 3-->walks occasionally  Mobility: 3-->slightly limited  Nutrition: 3-->adequate  Friction and Shear: 3-->no apparent problem  Adal Score: 18  Bed Support Surface: Atmos Air mattress  Reassessed using Bed Algorithm: No  Adal Intervention(s) Implemented: draw sheets, encouraged fluids, heels suspended, HOB elevated 30 degrees or less, incontinence care    Education    Patient has a [place] to Observation order: Yes  Observation education completed and documented: Yes      Corina Gordillo RN    "

## 2020-10-10 NOTE — PROGRESS NOTES
"Patient getting increasingly agitated. Screams out \"you guys go downstairs\", \"only 1 visitor in my house at a time\" and rambling about being quarantined. Patient seems bothered by noise in hallway but does not want her door shut.  Reorientation and reassurance provided. Blood pressure elevated at this time. Medicated with oxycodone at 1226,  buspar at 1231, scheduled tylenol and gabapentin at 1309 with no change in behavior. Shifts her weight in bed. Refuses to stay turned onto her sides supported with pillows-screams out to \"get off the hill\" and is fearful of falling. Bed alarm on for safety.   "

## 2020-10-10 NOTE — CONSULTS
[facility] Consultation    Consultation - [facility]  Level of consult: Consult and follow for daily recommendations    Mehreen Camara,  1940, MRN [id number]     Admitting Dx: Fall, initial encounter [W19.XXXA]  Compression fracture of L4 vertebra, initial encounter (H) [S32.040A]     PCP: Cathy Sargent, [phone]     Code status:  Prior     Extended Emergency Contact Information  Primary Emergency Contact: Irasema Rajan  Address: [address] [place], [place] [id number] [facility]  Work Phone: [phone]  Mobile Phone: [phone]  Relation: Daughter  Secondary Emergency Contact: Eladio Dowd  Address: [address] [place] [address] [facility]  Home Phone: [phone]  Mobile Phone: [phone]  Relation: Daughter     Assessment: L4 compression fracture    Plan:  We will order a TLSO given the absence of neurologic involvement.  Patient may be WBAT.  Wear TLSO at all times until follow up.  Arrange follow up with Dr. Ford in 2-3 weeks.    I will order an x-ray of the right hip in order to view the entire prosthesis.  If this is negative for fracture or implant loosening, the patient may be discharged after TLSO application, placement plans confirmed, and pain controlled.      Active Problems:    Compression fracture of fourth lumbar vertebra (H)    Compression fracture of L4 vertebra, initial encounter (H)       Chief Complaint  Bilateral hip pain and back pain     HPI  We have been requested by Dr. Escobedo to evaluate Mehreen Camara who is a 80 year old year old female for evaluation of bilateral hp pain and back pain  The patient complains of severe achy pain in the back and hips aggravated with motion and improved at rest.  The pain started yesterday evening when she fell while getting out of bed.  She tripped and developed pain in the lower extremities and back.  She denies neurological symptoms.    History  is obtained from the patient     Past Medical History  Past Medical History:   Diagnosis Date     Adrenal adenoma     stable, thought not to be hormonally active     Arthritis      Depressive disorder      Hypertension      Multiple system atrophy P (H)      Rheumatic fever     thought to have had as a child     Small bowel obstruction (H)      Thyroid disease        Surgical History  Past Surgical History:   Procedure Laterality Date     AAA REPAIR      wtih bifurcation graft     CARPAL TUNNEL RELEASE RT/LT Bilateral 2013     HYSTERECTOMY  2013     JOINT REPLACEMENT Right 2003     LAPAROSCOPIC CHOLECYSTECTOMY N/A 2018    Procedure: LAPAROSCOPIC CHOLECYSTECTOMY;  Surgeon: Amadeo Sebastian MD;  Location: [facility]     ORTHOPEDIC SURGERY       SPINE SURGERY      cervical spine fusion     THYROIDECTOMY          Social History  Social History     Socioeconomic History     Marital status:      Spouse name: Not on file     Number of children: Not on file     Years of education: Not on file     Highest education level: Not on file   Occupational History     Not on file   Social Needs     Financial resource strain: Not on file     Food insecurity     Worry: Not on file     Inability: Not on file     Transportation needs     Medical: Not on file     Non-medical: Not on file   Tobacco Use     Smoking status: Former Smoker     Packs/day: 0.50     Types: Cigarettes     Quit date: 1994     Years since quittin.8     Smokeless tobacco: Never Used   Substance and Sexual Activity     Alcohol use: No     Drug use: No     Sexual activity: Not Currently   Lifestyle     Physical activity     Days per week: Not on file     Minutes per session: Not on file     Stress: Not on file   Relationships     Social connections     Talks on phone: Not on file     Gets together: Not on file     Attends Worship service: Not on file     Active member of club or organization: Not on file     Attends meetings of clubs or  organizations: Not on file     Relationship status: Not on file     Intimate partner violence     Fear of current or ex partner: Not on file     Emotionally abused: Not on file     Physically abused: Not on file     Forced sexual activity: Not on file   Other Topics Concern     Parent/sibling w/ CABG, MI or angioplasty before 65F 55M? Not Asked   Social History Narrative     Not on file       Family History  Family History   Problem Relation Age of Onset     Hypertension Mother      Coronary Artery Disease Mother      Coronary Artery Disease Father      Other Cancer Brother      Parkinsonism Other         Allergies:  Penicillins, Aspirin, Atenolol, Atorvastatin, Bupropion, Clindamycin, Codeine, Ibuprofen, Lovastatin, and Cephalexin      Current Medications:  Current Facility-Administered Medications   Medication     fentaNYL (PF) (SUBLIMAZE) injection 25 mcg     HYDROmorphone (PF) (DILAUDID) injection 0.3-0.5 mg     naloxone (NARCAN) injection 0.1-0.4 mg     ondansetron (ZOFRAN) injection 4 mg     ondansetron (ZOFRAN-ODT) ODT tab 4 mg    Or     ondansetron (ZOFRAN) injection 4 mg     sodium chloride 0.9% infusion       Review of Systems:  CONSTITUTIONAL: NEGATIVE for fever, chills, change in weight  ENT/MOUTH: NEGATIVE for ear, mouth and throat problems  RESP: NEGATIVE for significant cough or SOB  CV: NEGATIVE for chest pain, palpitations or peripheral edema    Physical Exam:  Temp:  [97.6  F (36.4  C)-98.3  F (36.8  C)] 97.6  F (36.4  C)  Pulse:  [77-89] 79  Resp:  [16-18] 16  BP: (168-237)/() 168/81  SpO2:  [94 %-100 %] 100 %    The patient lying in the hospital bed in no acute distress.  The patient's breathing in unlabored.  The patient is awake.    Examination of the right hip reveals:   Pain with attempted range of motion, but this is in the knee and ankle and is poorly localized.   The skin over the hip is intact   Range of motion in flexion and rotation is severely limited due to pain in the leg.  No  groin or back pain       Examination of the left hip reveals   No pain with attempted range of motion in the groin or back, but she has pain in the knee.  Full range of motion of the knee.  Pain and tenderness is poorly localized.   The skin over the hip is intact   Range of motion is essentially normal, although examination is limited by the patient's condition.   Strength is essentially normal, although the examination is limited by the patient's condition.      Dorsalis pedis pulses are palpable bilaterally  Ankle flexion and extension and EHL and FHL are intact and 5/5 bilaterallybilaterally       Pertinent Labs  Lab Results: personally reviewed.  Lab Results   Component Value Date    WBC 5.3 10/10/2020    HGB 13.5 10/10/2020    HCT 41.6 10/10/2020    MCV 96 10/10/2020     10/10/2020     No results for input(s): INR in the last 168 hours.    Pertinent Radiology  Radiology Results: images and radiology report reviewed  Recent Results (from the past 24 hour(s))   Head CT w/o contrast    Narrative    EXAM: CT HEAD W/O CONTRAST, CT CERVICAL SPINE W/O CONTRAST  LOCATION: [facility]  DATE/TIME: 10/10/2020 1:48 AM    INDICATION: Head and neck injury  COMPARISON: 04/06/2020  TECHNIQUE:   HEAD CT: Without IV contrast. Multiplanar reformats. Dose reduction techniques were used.  CERVICAL SPINE CT: Routine without IV contrast. Multiplanar reformats. Dose reduction techniques were used.    FINDINGS:   HEAD CT:   INTRACRANIAL CONTENTS: No intracranial hemorrhage, extraaxial collection, or mass effect.  No CT evidence of acute infarct. Mild to moderate presumed chronic small vessel ischemic changes. Mild generalized volume loss. No hydrocephalus.     VISUALIZED ORBITS/SINUSES/MASTOIDS: No intraorbital abnormality. No paranasal sinus mucosal disease. No middle ear or mastoid effusion.    BONES/SOFT TISSUES: No acute abnormality.    CERVICAL SPINE CT:   VERTEBRA: Redemonstration of interbody ankylosis from  C5 through C7. There are stable alterations in the lateral alignment. Stable nonspecific lucency within the C5 vertebral body. No fracture or posttraumatic subluxation.     CANAL/FORAMINA: Advanced interbody degeneration at the C4-C5 level. Mild central canal stenosis at this level. High-grade foraminal narrowing on the left at C3-C4 and C4-C5 and on the right at C4-C5.    PARASPINAL: No extraspinal abnormality. Visualized lung fields are clear.      Impression    IMPRESSION:  HEAD CT:  1.  No acute intracranial hemorrhage or calvarial fracture.  2.  Mild to moderate age-related changes.    CERVICAL SPINE CT:  1.  No CT evidence for acute fracture or post traumatic subluxation.  2.  Degenerative changes as above.   Cervical spine CT w/o contrast    Narrative    EXAM: CT HEAD W/O CONTRAST, CT CERVICAL SPINE W/O CONTRAST  LOCATION: [facility]  DATE/TIME: 10/10/2020 1:48 AM    INDICATION: Head and neck injury  COMPARISON: 04/06/2020  TECHNIQUE:   HEAD CT: Without IV contrast. Multiplanar reformats. Dose reduction techniques were used.  CERVICAL SPINE CT: Routine without IV contrast. Multiplanar reformats. Dose reduction techniques were used.    FINDINGS:   HEAD CT:   INTRACRANIAL CONTENTS: No intracranial hemorrhage, extraaxial collection, or mass effect.  No CT evidence of acute infarct. Mild to moderate presumed chronic small vessel ischemic changes. Mild generalized volume loss. No hydrocephalus.     VISUALIZED ORBITS/SINUSES/MASTOIDS: No intraorbital abnormality. No paranasal sinus mucosal disease. No middle ear or mastoid effusion.    BONES/SOFT TISSUES: No acute abnormality.    CERVICAL SPINE CT:   VERTEBRA: Redemonstration of interbody ankylosis from C5 through C7. There are stable alterations in the lateral alignment. Stable nonspecific lucency within the C5 vertebral body. No fracture or posttraumatic subluxation.     CANAL/FORAMINA: Advanced interbody degeneration at the C4-C5 level. Mild  central canal stenosis at this level. High-grade foraminal narrowing on the left at C3-C4 and C4-C5 and on the right at C4-C5.    PARASPINAL: No extraspinal abnormality. Visualized lung fields are clear.      Impression    IMPRESSION:  HEAD CT:  1.  No acute intracranial hemorrhage or calvarial fracture.  2.  Mild to moderate age-related changes.    CERVICAL SPINE CT:  1.  No CT evidence for acute fracture or post traumatic subluxation.  2.  Degenerative changes as above.   CT Pelvis Bone wo Contrast    Narrative    EXAM: CT PELVIS BONE WO CONTRAST  LOCATION: [facility]  DATE/TIME: 10/10/2020 1:48 AM    INDICATION: Unwitnessed fall, left hip pain  COMPARISON: 05/16/2020  TECHNIQUE: CT scan of the pelvis was performed without IV contrast. Multiplanar reformats were obtained. Dose reduction techniques were used.  CONTRAST: None.    FINDINGS:    PELVIS ORGANS: Atherosclerotic vascular disease. Large volume retained feces in the rectal vault, correlate for fecal impaction. Normal appendix. No obstruction. Absent uterus. Aortoiliac stent graft.    MUSCULOSKELETAL: Diffuse osteopenia. Status post right total hip arthroplasty. No acute fracture or dislocation. No widening of the sacroiliac joints.      Impression    IMPRESSION:  1.  No displaced fracture or dislocation.  2.  Osteopenia.  3.  Right total hip arthroplasty.   Lumbar spine CT w/o contrast    Narrative    EXAM: CT LUMBAR SPINE W/O CONTRAST  LOCATION: [facility]  DATE/TIME: 10/10/2020 1:48 AM    INDICATION: Back injury with left hip pain.  COMPARISON: 05/16/2020  TECHNIQUE: Routine without IV contrast. Multiplanar reformats.  Dose reduction techniques were used.     FINDINGS:  VERTEBRA: There is a prominent levoconvex curve with an apex at L3-L4. At the L3-L4 level, again demonstrated are large Schmorl's nodes along the opposing endplates. These have enlarged compared with the previous study. In addition, there is a   questionable  linear lucency along the anterior aspect of the superior L4 endplate which was not definitely present on the previous study. Additionally, the buckling of the posterior cortex along superior endplate at L4 appears more prominent than it   previously did. Broad-based Schmorl's node along the superior endplate at L5 appears grossly unchanged. The L1 and L2 vertebral bodies are normal in height.     CANAL/FORAMINA: Underlying advanced multilevel degenerative changes. There is moderate to severe central canal stenosis at the L3-L4 level. There is high-grade foraminal narrowing on the right at L2-L3 and L3-L4 and on the left at L4-L5.    PARASPINAL: Aortic endovascular graft with bilateral iliac stents.      I reviewed the CT scans myself and agree with the above interpretations.  We are unable to see the entire right hip prosthesis to confirm the absence of a periprosthetic fracture.      Impression    IMPRESSION:  1.  There is evidence of a recent superior endplate compression fracture along the superior endplate at L4 superimposed upon a broad-based Schmorl's node which has worsened from the previous study.  2.  Underlying advanced multilevel degenerative changes as highlighted above.    NOTE: ABNORMAL REPORT    THE DICTATION ABOVE DESCRIBES AN ABNORMALITY FOR WHICH FOLLOWUP IS NEEDED.        Attestation:  I have reviewed today's vital signs, notes, medications, labs and imaging.     Xavier Bear MD

## 2020-10-10 NOTE — PROGRESS NOTES
Unable to redirect, 4 different staff members attempted to calm patient who is still screaming to get out of here. Charge nurse Amrit and  Dr Trujillo at bedside at this time.

## 2020-10-10 NOTE — ED PROVIDER NOTES
History     Chief Complaint   Patient presents with     Fall     Possible hip dislocation     HPI  Mehreen Camara is a 80 year old female who who presents by EMS with hip pain after a fall.  Patient arrived from her apartment in assisted living at [facility] stating that she went to use the bathroom. She was returning to bed when she decided she needed  to get some water to drink when she lost her footing fell and presents with hip pain and low back pain.  She reports a prior history of right hip arthroplasty about 17 years prior.  Patient reports no headache and no neck pain.  She has no abdominal pain and flank pain.  Patient has multiple medical diagnoses including history of bradycardia, Harris's esophagus, adrenal adenoma, hypothyroidism, hypertension, esophageal reflux, and history of AAA repair, movement disorder, rheumatic aortic stenosis, bilateral knee pain and and falls.   Patient is currently prescribed levothyroxine, vitamins, losartan, BuSpar, clindamycin, Sinemet,    Allergies:  Allergies   Allergen Reactions     Penicillins Anaphylaxis, Rash and Shortness Of Breath     Aspirin Nausea and GI Disturbance     Atenolol Cough     Atorvastatin Muscle Pain (Myalgia) and Nausea and Vomiting     Bupropion Nausea     Clindamycin Other (See Comments)     Constipation      Codeine Nausea     Ibuprofen Nausea     Stomach upset     Lovastatin Muscle Pain (Myalgia)     Cephalexin Rash     Neck reddness       Problem List:    Patient Active Problem List    Diagnosis Date Noted     Compression fracture of fourth lumbar vertebra (H) 10/10/2020     Priority: Medium     Compression fracture of L4 vertebra, initial encounter (H) 10/10/2020     Priority: Medium     Fall 08/05/2020     Priority: Medium     Bilateral knee pain 08/05/2020     Priority: Medium     Generalized muscle weakness 08/05/2020     Priority: Medium     Depression 08/05/2020     Priority: Medium     REM sleep behavior disorder 08/05/2020      Priority: Medium     Rupture of left Achilles tendon, sequela 12/31/2019     Priority: Medium     Abdominal pain, generalized 12/31/2019     Priority: Medium     Flatulence, eructation, and gas pain 08/28/2019     Priority: Medium     Post-op pain 12/12/2018     Priority: Medium     S/P laparoscopic cholecystectomy 12/12/2018     Priority: Medium     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     Priority: Medium     Movement disorder 09/19/2018     Priority: Medium     Rheumatic aortic stenosis 09/19/2018     Priority: Medium     Disorder of bursae and tendons in shoulder region 09/18/2018     Priority: Medium     Overview:   Created by Conversion    Replacement Utility updated for latest IMO load       Adjustment disorder with anxious mood 09/17/2018     Priority: Medium     Adjustment disorder with mixed anxiety and depressed mood 09/17/2018     Priority: Medium     Aneurysm of abdominal vessel (H) 09/17/2018     Priority: Medium     Overview:   Created by Conversion  [facility] Annotation: Jun 8 2011  8:07AM - Danni Ortiz: 4.4 on 11/2013.    Needs 6-12 month u/s or CT.    Replacement Utility updated for latest IMO load       Harris's esophagus 09/17/2018     Priority: Medium     Overview:   Created by [facility] Annotation: Feb  3 2012  8:32AM - Cameron Obando: Needs EGD 2/2017       Bradycardia 09/17/2018     Priority: Medium     Cataract 09/17/2018     Priority: Medium     Overview:   Created by Conversion       Major depressive disorder, recurrent episode (H) 09/17/2018     Priority: Medium     Overview:   Created by Conversion    Replacement Utility updated for latest IMO load       Constipation 09/17/2018     Priority: Medium     Dizziness 09/17/2018     Priority: Medium     Overview:   Created by Conversion       Dyslipidemia 09/17/2018     Priority: Medium     Overview:   Created by Conversion       Esophageal reflux 09/17/2018     Priority: Medium     Overview:   Created by  Conversion       Hypertension 09/17/2018     Priority: Medium     Overview:   Created by Conversion    Replacement Utility updated for latest IMO load       Hypothyroidism 09/17/2018     Priority: Medium     Overview:   Created by Conversion    Replacement Utility updated for latest IMO load       Insomnia due to anxiety and fear 09/17/2018     Priority: Medium     Lower back pain 09/17/2018     Priority: Medium     Osteoarthrosis 09/17/2018     Priority: Medium     Overview:   Created by Conversion    Replacement Utility updated for latest IMO load       Disorder of bone and cartilage 09/17/2018     Priority: Medium     Overview:   Created by Conversion  [facility] Annotation: Dec 13 2012  7:41Roberta Kelly: vit D/Ca, f/u   Dexa needed 1/2014.    Replacement Utility updated for latest IMO load       Lower extremity weakness 07/04/2018     Priority: Medium     Orthostatic hypotension dysautonomic syndrome (H) 09/22/2017     Priority: Medium     Primary insomnia 07/04/2017     Priority: Medium     Urinary incontinence in female 05/07/2017     Priority: Medium     Weakness 05/04/2017     Priority: Medium     S/P AAA repair using bifurcation graft 11/30/2015     Priority: Medium     Adrenal adenoma 11/20/2015     Priority: Medium     Overview:   Current hormonal evaluation through endocrinology          Past Medical History:    Past Medical History:   Diagnosis Date     Adrenal adenoma      Arthritis      Depressive disorder      Hypertension      Multiple system atrophy 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)      Rheumatic fever      Small bowel obstruction (H)      Thyroid disease        Past Surgical History:    Past Surgical History:   Procedure Laterality Date     AAA REPAIR  2015    [facility] bifurcation graft     CARPAL TUNNEL RELEASE RT/LT Bilateral 2013     HYSTERECTOMY  2013     JOINT REPLACEMENT Right 2003     LAPAROSCOPIC CHOLECYSTECTOMY N/A 12/12/2018    Procedure: LAPAROSCOPIC CHOLECYSTECTOMY;  Surgeon: Amadeo Sebastian MD;  Location:  "[place] OR     ORTHOPEDIC SURGERY       SPINE SURGERY  1981    cervical spine fusion     THYROIDECTOMY  2011       Family History:    Family History   Problem Relation Age of Onset     Hypertension Mother      Coronary Artery Disease Mother      Coronary Artery Disease Father      Other Cancer Brother      Parkinsonism Other        Social History:  Marital Status:   [5]  Social History     Tobacco Use     Smoking status: Former Smoker     Packs/day: 0.50     Types: Cigarettes     Quit date: 1994     Years since quittin.8     Smokeless tobacco: Never Used   Substance Use Topics     Alcohol use: No     Drug use: No        Medications:    No current outpatient medications on file.        Review of Systems   Constitutional:        Fall   HENT: Negative.    Eyes: Negative.    Respiratory: Negative.    Cardiovascular: Negative.    Gastrointestinal: Negative.    Endocrine: Negative.    Genitourinary: Negative.    Musculoskeletal:        Left hip pain   Skin: Negative.    Allergic/Immunologic: Negative.    Neurological: Negative.    Hematological: Negative.    Psychiatric/Behavioral: Negative.        Physical Exam   BP: (!) 237/109  Pulse: 81  Temp: 98.3  F (36.8  C)  Resp: 18  Height: (P) 167.6 cm (5' 6\")  Weight: 73.5 kg (162 lb 0.6 oz)  SpO2: 96 %      Physical Exam  Constitutional:       General: She is not in acute distress.     Appearance: She is not ill-appearing, toxic-appearing or diaphoretic.   HENT:      Head: Normocephalic and atraumatic.      Mouth/Throat:      Mouth: Mucous membranes are moist.   Eyes:      General: No scleral icterus.        Right eye: No discharge.         Left eye: No discharge.      Extraocular Movements: Extraocular movements intact.      Pupils: Pupils are equal, round, and reactive to light.   Neck:      Musculoskeletal: Normal range of motion and neck supple. No neck rigidity or muscular tenderness.      Vascular: No carotid bruit.   Cardiovascular:      Rate and Rhythm: " Normal rate and regular rhythm.   Pulmonary:      Effort: Pulmonary effort is normal. No respiratory distress.      Breath sounds: Normal breath sounds. No stridor. No wheezing, rhonchi or rales.   Chest:      Chest wall: No tenderness.   Abdominal:      General: There is no distension.      Palpations: There is no mass.      Tenderness: There is no abdominal tenderness. There is no right CVA tenderness, left CVA tenderness, guarding or rebound.      Hernia: No hernia is present.   Musculoskeletal:         General: Tenderness and signs of injury present.      Left hip: She exhibits tenderness and bony tenderness.        Legs:    Lymphadenopathy:      Cervical: No cervical adenopathy.   Skin:     Capillary Refill: Capillary refill takes less than 2 seconds.      Coloration: Skin is not jaundiced or pale.      Findings: No bruising, erythema, lesion or rash.   Neurological:      General: No focal deficit present.      Mental Status: She is alert and oriented to person, place, and time.   Psychiatric:         Mood and Affect: Mood normal.         Behavior: Behavior normal.         Thought Content: Thought content normal.         Judgment: Judgment normal.         ED Course        Procedures               Critical Care time:  none               ED medications:  Medications   sodium chloride 0.9% infusion (has no administration in time range)   fentaNYL (PF) (SUBLIMAZE) injection 25 mcg (25 mcg Intravenous Given 10/10/20 0213)   ondansetron (ZOFRAN) injection 4 mg (has no administration in time range)   0.9% sodium chloride BOLUS (500 mLs Intravenous New Bag 10/10/20 0125)   oxyCODONE-acetaminophen (PERCOCET) 5-325 MG per tablet 1 tablet (1 tablet Oral Given 10/10/20 0339)       ED Vitals:  Vitals:    10/10/20 0415 10/10/20 0430 10/10/20 0445 10/10/20 0517   BP: (!) 188/101 (!) 169/97 (!) 172/103    Pulse: 77 89 84    Resp:    (P) 16   Temp:       TempSrc:    (P) Oral   SpO2:   98%    Weight:       Height:    (P) 1.676 m  "(5' 6\")       ED labs and imaging:  Results for orders placed or performed during the hospital encounter of 10/10/20 (from the past 24 hour(s))   Head CT w/o contrast    Narrative    EXAM: CT HEAD W/O CONTRAST, CT CERVICAL SPINE W/O CONTRAST  LOCATION: [facility]  DATE/TIME: 10/10/2020 1:48 AM    INDICATION: Head and neck injury  COMPARISON: 04/06/2020  TECHNIQUE:   HEAD CT: Without IV contrast. Multiplanar reformats. Dose reduction techniques were used.  CERVICAL SPINE CT: Routine without IV contrast. Multiplanar reformats. Dose reduction techniques were used.    FINDINGS:   HEAD CT:   INTRACRANIAL CONTENTS: No intracranial hemorrhage, extraaxial collection, or mass effect.  No CT evidence of acute infarct. Mild to moderate presumed chronic small vessel ischemic changes. Mild generalized volume loss. No hydrocephalus.     VISUALIZED ORBITS/SINUSES/MASTOIDS: No intraorbital abnormality. No paranasal sinus mucosal disease. No middle ear or mastoid effusion.    BONES/SOFT TISSUES: No acute abnormality.    CERVICAL SPINE CT:   VERTEBRA: Redemonstration of interbody ankylosis from C5 through C7. There are stable alterations in the lateral alignment. Stable nonspecific lucency within the C5 vertebral body. No fracture or posttraumatic subluxation.     CANAL/FORAMINA: Advanced interbody degeneration at the C4-C5 level. Mild central canal stenosis at this level. High-grade foraminal narrowing on the left at C3-C4 and C4-C5 and on the right at C4-C5.    PARASPINAL: No extraspinal abnormality. Visualized lung fields are clear.      Impression    IMPRESSION:  HEAD CT:  1.  No acute intracranial hemorrhage or calvarial fracture.  2.  Mild to moderate age-related changes.    CERVICAL SPINE CT:  1.  No CT evidence for acute fracture or post traumatic subluxation.  2.  Degenerative changes as above.   Cervical spine CT w/o contrast    Narrative    EXAM: CT HEAD W/O CONTRAST, CT CERVICAL SPINE W/O CONTRAST  LOCATION: " [facility]  DATE/TIME: 10/10/2020 1:48 AM    INDICATION: Head and neck injury  COMPARISON: 04/06/2020  TECHNIQUE:   HEAD CT: Without IV contrast. Multiplanar reformats. Dose reduction techniques were used.  CERVICAL SPINE CT: Routine without IV contrast. Multiplanar reformats. Dose reduction techniques were used.    FINDINGS:   HEAD CT:   INTRACRANIAL CONTENTS: No intracranial hemorrhage, extraaxial collection, or mass effect.  No CT evidence of acute infarct. Mild to moderate presumed chronic small vessel ischemic changes. Mild generalized volume loss. No hydrocephalus.     VISUALIZED ORBITS/SINUSES/MASTOIDS: No intraorbital abnormality. No paranasal sinus mucosal disease. No middle ear or mastoid effusion.    BONES/SOFT TISSUES: No acute abnormality.    CERVICAL SPINE CT:   VERTEBRA: Redemonstration of interbody ankylosis from C5 through C7. There are stable alterations in the lateral alignment. Stable nonspecific lucency within the C5 vertebral body. No fracture or posttraumatic subluxation.     CANAL/FORAMINA: Advanced interbody degeneration at the C4-C5 level. Mild central canal stenosis at this level. High-grade foraminal narrowing on the left at C3-C4 and C4-C5 and on the right at C4-C5.    PARASPINAL: No extraspinal abnormality. Visualized lung fields are clear.      Impression    IMPRESSION:  HEAD CT:  1.  No acute intracranial hemorrhage or calvarial fracture.  2.  Mild to moderate age-related changes.    CERVICAL SPINE CT:  1.  No CT evidence for acute fracture or post traumatic subluxation.  2.  Degenerative changes as above.   CT Pelvis Bone wo Contrast    Narrative    EXAM: CT PELVIS BONE WO CONTRAST  LOCATION: [facility]  DATE/TIME: 10/10/2020 1:48 AM    INDICATION: Unwitnessed fall, left hip pain  COMPARISON: 05/16/2020  TECHNIQUE: CT scan of the pelvis was performed without IV contrast. Multiplanar reformats were obtained. Dose reduction techniques were used.  CONTRAST:  None.    FINDINGS:    PELVIS ORGANS: Atherosclerotic vascular disease. Large volume retained feces in the rectal vault, correlate for fecal impaction. Normal appendix. No obstruction. Absent uterus. Aortoiliac stent graft.    MUSCULOSKELETAL: Diffuse osteopenia. Status post right total hip arthroplasty. No acute fracture or dislocation. No widening of the sacroiliac joints.      Impression    IMPRESSION:  1.  No displaced fracture or dislocation.  2.  Osteopenia.  3.  Right total hip arthroplasty.   Lumbar spine CT w/o contrast    Narrative    EXAM: CT LUMBAR SPINE W/O CONTRAST  LOCATION: [facility]  DATE/TIME: 10/10/2020 1:48 AM    INDICATION: Back injury with left hip pain.  COMPARISON: 05/16/2020  TECHNIQUE: Routine without IV contrast. Multiplanar reformats.  Dose reduction techniques were used.     FINDINGS:  VERTEBRA: There is a prominent levoconvex curve with an apex at L3-L4. At the L3-L4 level, again demonstrated are large Schmorl's nodes along the opposing endplates. These have enlarged compared with the previous study. In addition, there is a   questionable linear lucency along the anterior aspect of the superior L4 endplate which was not definitely present on the previous study. Additionally, the buckling of the posterior cortex along superior endplate at L4 appears more prominent than it   previously did. Broad-based Schmorl's node along the superior endplate at L5 appears grossly unchanged. The L1 and L2 vertebral bodies are normal in height.     CANAL/FORAMINA: Underlying advanced multilevel degenerative changes. There is moderate to severe central canal stenosis at the L3-L4 level. There is high-grade foraminal narrowing on the right at L2-L3 and L3-L4 and on the left at L4-L5.    PARASPINAL: Aortic endovascular graft with bilateral iliac stents.      Impression    IMPRESSION:  1.  There is evidence of a recent superior endplate compression fracture along the superior endplate at L4  superimposed upon a broad-based Schmorl's node which has worsened from the previous study.  2.  Underlying advanced multilevel degenerative changes as highlighted above.    NOTE: ABNORMAL REPORT    THE DICTATION ABOVE DESCRIBES AN ABNORMALITY FOR WHICH FOLLOWUP IS NEEDED.    UA reflex to Microscopic and Culture    Specimen: Catheterized Urine   Result Value Ref Range    Color Urine Straw     Appearance Urine Clear     Glucose Urine Negative NEG^Negative mg/dL    Bilirubin Urine Negative NEG^Negative    Ketones Urine Negative NEG^Negative mg/dL    Specific Gravity Urine 1.004 1.003 - 1.035    Blood Urine Negative NEG^Negative    pH Urine 8.0 (H) 5.0 - 7.0 pH    Protein Albumin Urine Negative NEG^Negative mg/dL    Urobilinogen mg/dL 0.0 0.0 - 2.0 mg/dL    Nitrite Urine Negative NEG^Negative    Leukocyte Esterase Urine Negative NEG^Negative    Source Catheterized Urine    CBC with platelets differential   Result Value Ref Range    WBC 5.3 4.0 - 11.0 10e9/L    RBC Count 4.32 3.8 - 5.2 10e12/L    Hemoglobin 13.5 11.7 - 15.7 g/dL    Hematocrit 41.6 35.0 - 47.0 %    MCV 96 78 - 100 fl    MCH 31.3 26.5 - 33.0 pg    MCHC 32.5 31.5 - 36.5 g/dL    RDW 11.4 10.0 - 15.0 %    Platelet Count 194 150 - 450 10e9/L    Diff Method Automated Method     % Neutrophils 67.5 %    % Lymphocytes 24.1 %    % Monocytes 6.8 %    % Eosinophils 0.8 %    % Basophils 0.8 %    % Immature Granulocytes 0.0 %    Nucleated RBCs 0 0 /100    Absolute Neutrophil 3.6 1.6 - 8.3 10e9/L    Absolute Lymphocytes 1.3 0.8 - 5.3 10e9/L    Absolute Monocytes 0.4 0.0 - 1.3 10e9/L    Absolute Eosinophils 0.0 0.0 - 0.7 10e9/L    Absolute Basophils 0.0 0.0 - 0.2 10e9/L    Abs Immature Granulocytes 0.0 0 - 0.4 10e9/L    Absolute Nucleated RBC 0.0    Basic metabolic panel   Result Value Ref Range    Sodium 140 133 - 144 mmol/L    Potassium 3.5 3.4 - 5.3 mmol/L    Chloride 104 94 - 109 mmol/L    Carbon Dioxide 32 20 - 32 mmol/L    Anion Gap 4 3 - 14 mmol/L    Glucose 101  (H) 70 - 99 mg/dL    Urea Nitrogen 15 7 - 30 mg/dL    Creatinine 0.55 0.52 - 1.04 mg/dL    GFR Estimate 88 >60 mL/min/[1.73_m2]    GFR Estimate If Black >90 >60 mL/min/[1.73_m2]    Calcium 8.4 (L) 8.5 - 10.1 mg/dL         Assessments & Plan (with Medical Decision Making)   Assessment Summary and Clinical Impression: 80-year-old female who presents by EMS after an unwitnessed fall in her apartment.  Patient sustained a superior and plate compression fracture of her L4 without other traumatic findings on serial imaging during her ED course.  She is admitted because she lives in assisted living for pain management, and for further evaluation and care. Patient is a resident at [facility] and lives in assisted living.  She reports she went to the bathroom and returned to her bedroom and was going to get something to drink when she lost her footing and fell.  She presented by EMS with left hip pain and low back pain..  Pre-arrival EMS administered intranasal fentanyl.  There was no limb length discrepancy or abnormal rotation on examination. Patient had no abdominal pain, no headache, and no neck pain.  Pelvis was stable.There  was no abdominal bruising.  Patient had marginal pain control during ED course and was notably hypertensive.    ED course and Plan:  Reviewed the medical record.  Patient was offered medication for pain and discomfort in hopes that this would help improve her hypertension. With age and unwitnessed fall CT imaging of the head and neck was obtained to exclude any acute traumatic injuries. Imaging of her lumbar spine, pelvis and left hip was also obtained.  CT imaging confirmed a L4 compression fracture but no other traumatic findings were noted about the head, cervical spine, pelvis or hip. See additional details in the interpreting radiology report above. Patient had marginal pain control during her ED course.  I reviewed patient's presentation and CT imaging interpretation with orthopedic  surgery on-call. I spoke with Dr.C. Leah FRAGA at 3:35 AM.  We reviewed patient's CT interpretation and history.  Dr. Moyer recommended a TLSO brace and the patient could be followed by Dr. Ford at [facility].  Please see orthopedic consult note for additional details.   With marginal pain control patient lives in assisted living she is admitted for pain control and disposition planning.  Catheterized urinalysis obtained as patient was noted to have urinary urgency.  Catheterized UA is negative for infection.     I spoke with Dr YANG Bailey-admitting hospitalist at 3.45am who agreed to assume care upon hand-off for admission.  I reviewed rationale for admission, clinical history and presentation, ED course, work-up, imaging results, my discussion with orthopedic surgery.         ED to Inpatient Handoff:    Discussed with Dr YANG Bailey at 3.45am  Patient accepted for Observation Stay  Pending studies include SARS COV-2 PCR  Code Status: DNR, DNI- reviewed with patient and daughter- Eladio Dowd who later arrived during ED course.         Disclaimer: This note consists of symbols derived from keyboarding, dictation and/or voice recognition software. As a result, there may be errors in the script that have gone undetected. Please consider this when interpreting information found in this chart.  I have reviewed the nursing notes.    I have reviewed the findings, diagnosis, plan and need for follow up with the patient.       Current Discharge Medication List          Final diagnoses:   Fall, initial encounter - Unwitnessed fall   Compression fracture of L4 vertebra, initial encounter (H) - post-fall       10/10/2020   [facility] EMERGENCY DEPT     Leonel Escobedo MD  10/10/20 0518

## 2020-10-10 NOTE — PROGRESS NOTES
"Patient screaming to take her brace off. Attempting to get out of bed. Yelling at staff stating we cannot hold her against her will and \"get me out of here the same way you got me in here.\"  Patient throwing the telephone, stating she is going to call the police.   "

## 2020-10-11 ENCOUNTER — APPOINTMENT (OUTPATIENT)
Dept: PHYSICAL THERAPY | Facility: CLINIC | Age: 80
DRG: 552 | End: 2020-10-11
Payer: COMMERCIAL

## 2020-10-11 ENCOUNTER — APPOINTMENT (OUTPATIENT)
Dept: OCCUPATIONAL THERAPY | Facility: CLINIC | Age: 80
DRG: 552 | End: 2020-10-11
Payer: COMMERCIAL

## 2020-10-11 PROBLEM — S32.040A COMPRESSION FRACTURE OF L4 LUMBAR VERTEBRA, CLOSED, INITIAL ENCOUNTER (H): Status: ACTIVE | Noted: 2020-10-11

## 2020-10-11 PROCEDURE — 120N000001 HC R&B MED SURG/OB

## 2020-10-11 PROCEDURE — G0378 HOSPITAL OBSERVATION PER HR: HCPCS

## 2020-10-11 PROCEDURE — 99233 SBSQ HOSP IP/OBS HIGH 50: CPT | Performed by: FAMILY MEDICINE

## 2020-10-11 PROCEDURE — 97161 PT EVAL LOW COMPLEX 20 MIN: CPT | Mod: GP

## 2020-10-11 PROCEDURE — 97116 GAIT TRAINING THERAPY: CPT | Mod: GP

## 2020-10-11 PROCEDURE — 250N000013 HC RX MED GY IP 250 OP 250 PS 637: Performed by: FAMILY MEDICINE

## 2020-10-11 RX ORDER — QUETIAPINE FUMARATE 25 MG/1
25 TABLET, FILM COATED ORAL 2 TIMES DAILY PRN
Status: DISCONTINUED | OUTPATIENT
Start: 2020-10-11 | End: 2020-10-12 | Stop reason: HOSPADM

## 2020-10-11 RX ORDER — QUETIAPINE FUMARATE 25 MG/1
50 TABLET, FILM COATED ORAL AT BEDTIME
Status: DISCONTINUED | OUTPATIENT
Start: 2020-10-11 | End: 2020-10-12 | Stop reason: HOSPADM

## 2020-10-11 RX ORDER — HALOPERIDOL 5 MG/ML
2 INJECTION INTRAMUSCULAR EVERY 6 HOURS PRN
Status: DISCONTINUED | OUTPATIENT
Start: 2020-10-11 | End: 2020-10-12 | Stop reason: HOSPADM

## 2020-10-11 RX ADMIN — LOSARTAN POTASSIUM 50 MG: 50 TABLET, FILM COATED ORAL at 08:25

## 2020-10-11 RX ADMIN — DULOXETINE HYDROCHLORIDE 60 MG: 30 CAPSULE, DELAYED RELEASE ORAL at 08:25

## 2020-10-11 RX ADMIN — OXYCODONE HYDROCHLORIDE 5 MG: 5 TABLET ORAL at 03:23

## 2020-10-11 RX ADMIN — CARBIDOPA AND LEVODOPA 2 TABLET: 25; 100 TABLET ORAL at 08:25

## 2020-10-11 RX ADMIN — GABAPENTIN 300 MG: 300 CAPSULE ORAL at 20:27

## 2020-10-11 RX ADMIN — LEVOTHYROXINE SODIUM 100 MCG: 100 TABLET ORAL at 08:26

## 2020-10-11 RX ADMIN — CARBIDOPA AND LEVODOPA 2 TABLET: 25; 100 TABLET ORAL at 13:52

## 2020-10-11 RX ADMIN — GABAPENTIN 300 MG: 300 CAPSULE ORAL at 13:52

## 2020-10-11 RX ADMIN — OXYCODONE HYDROCHLORIDE 5 MG: 5 TABLET ORAL at 12:04

## 2020-10-11 RX ADMIN — ACETAMINOPHEN 1000 MG: 500 TABLET, FILM COATED ORAL at 20:26

## 2020-10-11 RX ADMIN — OXYCODONE HYDROCHLORIDE 5 MG: 5 TABLET ORAL at 22:42

## 2020-10-11 RX ADMIN — POLYETHYLENE GLYCOL 3350 17 G: 17 POWDER, FOR SOLUTION ORAL at 08:26

## 2020-10-11 RX ADMIN — OXYCODONE HYDROCHLORIDE 5 MG: 5 TABLET ORAL at 19:08

## 2020-10-11 RX ADMIN — CARBIDOPA AND LEVODOPA 2 TABLET: 25; 100 TABLET ORAL at 20:27

## 2020-10-11 RX ADMIN — ACETAMINOPHEN 1000 MG: 500 TABLET, FILM COATED ORAL at 05:52

## 2020-10-11 RX ADMIN — BUSPIRONE HYDROCHLORIDE 5 MG: 5 TABLET ORAL at 05:53

## 2020-10-11 RX ADMIN — PANTOPRAZOLE SODIUM 20 MG: 20 TABLET, DELAYED RELEASE ORAL at 08:26

## 2020-10-11 RX ADMIN — CARBOXYMETHYLCELLULOSE SODIUM 2 DROP: 5 SOLUTION/ DROPS OPHTHALMIC at 13:52

## 2020-10-11 RX ADMIN — CARBOXYMETHYLCELLULOSE SODIUM 2 DROP: 5 SOLUTION/ DROPS OPHTHALMIC at 20:27

## 2020-10-11 RX ADMIN — OXYCODONE HYDROCHLORIDE 5 MG: 5 TABLET ORAL at 08:26

## 2020-10-11 RX ADMIN — ACETAMINOPHEN 1000 MG: 500 TABLET, FILM COATED ORAL at 13:51

## 2020-10-11 RX ADMIN — BISACODYL 5 MG: 5 TABLET, COATED ORAL at 08:26

## 2020-10-11 RX ADMIN — QUETIAPINE FUMARATE 50 MG: 25 TABLET ORAL at 22:42

## 2020-10-11 RX ADMIN — GABAPENTIN 300 MG: 300 CAPSULE ORAL at 08:25

## 2020-10-11 RX ADMIN — CARBOXYMETHYLCELLULOSE SODIUM 2 DROP: 5 SOLUTION/ DROPS OPHTHALMIC at 08:26

## 2020-10-11 RX ADMIN — HYDROCHLOROTHIAZIDE 25 MG: 25 TABLET ORAL at 08:26

## 2020-10-11 ASSESSMENT — ACTIVITIES OF DAILY LIVING (ADL)
ADLS_ACUITY_SCORE: 22
DOING_ERRANDS_INDEPENDENTLY_DIFFICULTY: NO
ADLS_ACUITY_SCORE: 22
WEAR_GLASSES_OR_BLIND: NO
DRESSING/BATHING_DIFFICULTY: NO
TOILETING_ISSUES: NO
FALL_HISTORY_WITHIN_LAST_SIX_MONTHS: YES
CONCENTRATING,_REMEMBERING_OR_MAKING_DECISIONS_DIFFICULTY: NO
NUMBER_OF_TIMES_PATIENT_HAS_FALLEN_WITHIN_LAST_SIX_MONTHS: 2
WALKING_OR_CLIMBING_STAIRS_DIFFICULTY: NO
EQUIPMENT_CURRENTLY_USED_AT_HOME: WALKER, ROLLING
WHICH_OF_THE_ABOVE_FUNCTIONAL_RISKS_HAD_A_RECENT_ONSET_OR_CHANGE?: AMBULATION;TRANSFERRING;TOILETING
DIFFICULTY_COMMUNICATING: NO
DIFFICULTY_EATING/SWALLOWING: NO

## 2020-10-11 NOTE — PROGRESS NOTES
Discharge Planner PT   Patient plan for discharge: TCU  Current status: Transfers sit <> supine with mod A of 1, sit <> stand with mod-max A of 2. Amb with RW and CGA of 2 bed to chair (3') with fwd flexed posture, short step length, slow valeria.  Barriers to return to prior living situation: incr need for assist  Recommendations for discharge: TCU  Rationale for recommendations: incr need for assist       Entered by: Sarah Beth Dowd 10/11/2020 12:46 PM     Physical Therapy Evaluation       10/11/20 1232   Quick Adds   Type of Visit Initial PT Evaluation   Living Environment   People in home facility resident   Current Living Arrangements assisted living   Home Accessibility no concerns   Self-Care   Equipment Currently Used at Home walker, rolling   Disability/Function   Hearing Difficulty or Deaf no   Wear Glasses or Blind no   Concentrating, Remembering or Making Decisions Difficulty no   Difficulty Communicating no   Difficulty Eating/Swallowing no   Walking or Climbing Stairs Difficulty no   Dressing/Bathing Difficulty no   Doing Errands Independently Difficulty (such as shopping) no   Fall history within last six months yes   Number of times patient has fallen within last six months 2   General Information   Onset of Illness/Injury or Date of Surgery 10/10/20   Referring Physician Dr Langley   Patient/Family Therapy Goals Statement (PT) wants to go home   Pertinent History of Current Problem (include personal factors and/or comorbidities that impact the POC) Per H&P:  Mehreen Camara is a 80 year old female who  lost her footing fell and presents with hip pain and low back pain.  She reports a prior history of right hip arthroplasty about 17 years prior.  Patient reports no headache and no neck pain.  She has no abdominal pain and flank pain.    Cognition   Orientation Status (Cognition) oriented to;person;situation   Affect/Mental Status (Cognition) confused   Follows Commands (Cognition) follows one-step  commands   Behavioral Issues overwhelmed easily   Safety Deficit (Cognition) ability to follow commands   Posture    Posture Forward head position;Protracted shoulders;Kyphosis   Range of Motion (ROM)   ROM Quick Adds ROM WFL   Strength   Strength Comments generally 4 to 4+/5 B LE   Bed Mobility   Bed Mobility supine-sit;sit-supine   Supine-Sit [place] (Bed Mobility) moderate assist (50% patient effort)   Sit-Supine [place] (Bed Mobility) moderate assist (50% patient effort)   Impairments Contributing to Impaired Bed Mobility decreased strength;pain   Assistive Device (Bed Mobility) bed rails;draw sheet   Transfers   Transfers sit-stand transfer   Impairments Contributing to Impaired Transfers pain;decreased strength   Sit-Stand Transfer   Sit-Stand [place] (Transfers) moderate assist (50% patient effort);maximum assist (25% patient effort);2 person assist   Assistive Device (Sit-Stand Transfers) walker, front-wheeled   Gait/Stairs (Locomotion)   [place] Level (Gait) contact guard   Assistive Device (Gait) walker, front-wheeled   Distance in Feet (Required for LE Total Joints) 3'   Pattern (Gait) step-through   Deviations/Abnormal Patterns (Gait) valeria decreased;stride length decreased   Balance   Balance Comments good with RW   Clinical Impression   Criteria for Skilled Therapeutic Intervention yes, treatment indicated   PT Diagnosis (PT) L4 compression fx   Influenced by the following impairments pain, weakness   Functional limitations due to impairments mobility and gait   Clinical Presentation Stable/Uncomplicated   Clinical Presentation Rationale clinical judgement   Clinical Decision Making (Complexity) low complexity   Therapy Frequency (PT) Daily   Predicted Duration of Therapy Intervention (days/wks) 3 days   Planned Therapy Interventions (PT) bed mobility training;gait training;transfer training;strengthening   Risk & Benefits of therapy have been explained evaluation/treatment  "results reviewed;care plan/treatment goals reviewed;risks/benefits reviewed;participants included;patient   PT Discharge Planning    PT Discharge Recommendation (DC Rec) Transitional Care Facility   PT Rationale for DC Rec incr need for assist   PT Brief overview of current status  Transfers sit <> supine with mod A of 1, sit <> stand with mod-max A of 2. Amb with RW and CGA of 2 bed to chair (3') with fwd flexed posture, short step length, slow valeria.   [facility] [employer] [employer] TM \"6 Clicks\"   2016, Trustees of [facility], under license to [employer].  All rights reserved.   6 Clicks Short Forms Basic Mobility Inpatient Short Form   [facility] AM-PAC  \"6 Clicks\" V.2 Basic Mobility Inpatient Short Form   2. Moving from lying on your back to sitting on the side of a flat bed without using bedrails? 2 - A Lot   3. Moving to and from a bed to a chair (including a wheelchair)? 3 - A Little   4. Standing up from a chair using your arms (e.g., wheelchair, or bedside chair)? 2 - A Lot   5. To walk in hospital room? 3 - A Little   6. Climbing 3-5 steps with a railing? 1 - Total   Total Evaluation Time   Total Evaluation Time (Minutes) 15     See Care Plan for Goals.    Sarah Beth Dowd PT      "

## 2020-10-11 NOTE — PROGRESS NOTES
Date of Admission:  10/10/2020        Assessment & Plan     Mehreen Camara is a 80 year old female admitted on 10/10/2020.      L4 compression fracture with severe progressive scoliosis   -This occurred after mechanical fall  - Per Ortho they would like her to have a TLSO brace which will be fitted tonight and apply applied tomorrow.  Discussed with Dr. Moyer of orthospine, he feels if she will not wear it it is not essential for the safety of this fracture, but is useful to prevent progressive scoliosis which is being seen on her serial CTs  Tylenol thousand milligrams 3 times daily plus oxycodone 5 mg every 3 hours as needed pain  From a mental standpoint she is going to tolerate this poorly.  Is needing significant assistance at this time.  -She is to follow with Dr. Ford in 2 to 3 weeks as an outpatient  - Will encourage the TLSO brace, but not push it if she does not want to wear it     Hypertension  -Blood pressures have been running very high here but now below's 170 systolic.  - No change in her blood pressure medications at this time.  Continue losartan and HCTZ     Anxiety/depression/mirta psychosis  Continue her duloxetine and BuSpar  - She is quite anxious here and can be extremely demanding.    At times she is completely non-directable, screaming at nurses, throwing things, slamming the telephone on the table.  - She got Haldol 2 mg the evening of 10/10  -We will try to start her on Seroquel 25 mg twice daily as needed +50 mg every evening.  - Long discussion with daughter Irasema, she has been gradually ramping up these behaviors in the assisted living, and having associated hallucinations/delusions where she thinks there is various characters in her bed and she will sleep in her chair instead.  She initially could realize these were not real but no longer can differentiate  - She may be best served by getting her into a [facility], but at the minimum will need TCU stay and some ongoing titration  "of her psych meds     Mild cognitive impairment versus mirta Alzheimer's dementia  -She  certainly has some confusion here, though she knows the date, day, where she lives, but cannot name this hospital.  - OT to do slums eval     Aortic stenosis  -This is mild per echo in 2018, no murmurs or reason to suspect it has gotten significantly worse           Diet: Regular Diet Adult    DVT Prophylaxis: Low Risk/Ambulatory with no VTE prophylaxis indicated  Diaz Catheter: not present  Code Status: No CPR- Do NOT Intubate  Discussion; her pain appears to be reasonably controlled but her  behaviors are very difficult.  She can be cooperative at 1 minute and then be screaming, throwing things.  We will try some Seroquel, and TCU or if possible [facility]   Change to inpatient          SUBJECTIVE:   Became very uncooperative last evening, screaming at nurses, slamming the telephone down.  She has some delusions that people are out to get her and not listening to her.        ROS:4 point ROS including Respiratory, CV, GI and , other than that noted in the HPI,  is negative     OBJECTIVE:   BP (!) 188/92 (BP Location: Right arm)   Pulse 75   Temp 98.4  F (36.9  C) (Axillary)   Resp 18   Ht 1.676 m (5' 6\")   Wt 69.4 kg (153 lb)   SpO2 94%   BMI 24.69 kg/m      GENERAL APPEARANCE: Awake, alert, calm, no apparent distress, but little or no insight into her behaviors     RESP: Clear     CV: regular rate and rhythm, no murmur , edema: None     Abdomen: soft, nontender, no liver or spleen enlargement, no masses, BSs normal   Skin: no cyanosis, pallor, or jaundice    CMP  Recent Labs   Lab 10/10/20  0415      POTASSIUM 3.5   CHLORIDE 104   CO2 32   ANIONGAP 4   *   BUN 15   CR 0.55   GFRESTIMATED 88   GFRESTBLACK >90   YADIRA 8.4*     CBC  Recent Labs   Lab 10/10/20  0415   WBC 5.3   RBC 4.32   HGB 13.5   HCT 41.6   MCV 96   MCH 31.3   MCHC 32.5   RDW 11.4        INRNo lab results found in last 7 " days.  Arterial BloodGas  No lab results found in last 7 days.   Venous Blood Gas  No lab results found in last 7 days.    Medications     acetaminophen  1,000 mg Oral TID     bisacodyl  5 mg Oral Daily     carbidopa-levodopa  2 tablet Oral TID     artificial tears ophthalmic solution  2 drop Both Eyes TID     DULoxetine  60 mg Oral Daily     gabapentin  300 mg Oral TID     hydrochlorothiazide  25 mg Oral Daily     levothyroxine  100 mcg Oral Daily     losartan  50 mg Oral Daily     pantoprazole  20 mg Oral Daily     polyethylene glycol  17 g Oral Daily     QUEtiapine  50 mg Oral At Bedtime     sodium chloride (PF)  3 mL Intracatheter Q8H       Intake/Output Summary (Last 24 hours) at 10/11/2020 1104  Last data filed at 10/11/2020 0900  Gross per 24 hour   Intake 940 ml   Output 100 ml   Net 840 ml

## 2020-10-11 NOTE — UTILIZATION REVIEW
Admission Status; Secondary Review Determination    Under the authority of the Utilization Management Committee, the utilization review process indicated a secondary review on the above patient. The review outcome is based on review of the medical records, discussions with staff, and applying clinical experience noted on the date of the review.    (x) Inpatient Status Appropriate - This patient's medical care is consistent with medical management for inpatient care and reasonable inpatient medical practice.    RATIONALE FOR DETERMINATION: 80-year-old female with history of multiple medical problems including hypertension, Harris's esophagus, bradycardia, rheumatic aortic stenosis, bilateral knee pain with recurrent falls, mental health disease who now presents after a fall with acute onset of hip and low back pain.  Patient found to have an acute L4 compression fracture require significant pain control measures as well was needing a TLSO brace for adequate pain control.  Unfortunately patient has developed symptoms of acute confusion and delirium along with some agitation with difficult for T4 pain control mobility and instituting the wearing of the brace.  Patient thus will require greater than 2 nights in the hospital for active management of adequate mobility and pain control prior to safe discharge appropriate for inpatient care.    At the time of admission with the information available to the attending physician more than 2 nights Hospital complex care was anticipated, based on patient risk of adverse outcome if treated as outpatient and complex care required. Inpatient admission is appropriate based on the Medicare guidelines.    This document was produced using voice recognition software    The information on this document is developed by the utilization review team in order for the business office to ensure compliance. This only denotes the appropriateness of proper admission status and does not  reflect the quality of care rendered.    The definitions of Inpatient Status and Observation Status used in making the determination above are those provided in the CMS Coverage Manual, Chapter 1 and Chapter 6, section 70.4.    Sincerely,    Renny Ascencio MD  Utilization Review  Physician Advisor  [facility].

## 2020-10-11 NOTE — PROGRESS NOTES
[facility] Orthopedic Post-Op Note         Assessment and Plan:    Assessment:   Non operative patient  Non-op L4 compression fracture (stable)           Plan:   Patient may be WBAT.  Wear TLSO at all times until follow up.  Physical therapy  Pain control measures  Discharge plan: Most likely will need SNF  Additional problems to be followed by medicine physician: Mental status/Dementia           Interval History:   Stable.  Doing well (from an orthopedic standpoint).  Improving slowly.            Significant Problems:   (Refer to attending physician notes regarding additional medical problems and issues)          Review of Systems:   The patient denies any chest pain, shortness of breath, excessive pain, fever, chills, purulent drainage from the wound, nausea or vomiting.          Medications:     Current Facility-Administered Medications   Medication     acetaminophen (TYLENOL) tablet 1,000 mg     bisacodyl (DULCOLAX) EC tablet 5 mg     bisacodyl (DULCOLAX) Suppository 10 mg     busPIRone (BUSPAR) tablet 5 mg     carbidopa-levodopa (SINEMET)  MG per tablet 2 tablet     carboxymethylcellulose PF (REFRESH PLUS) 0.5 % ophthalmic solution 2 drop     DULoxetine (CYMBALTA) DR capsule 60 mg     fentaNYL (PF) (SUBLIMAZE) injection 25 mcg     gabapentin (NEURONTIN) capsule 300 mg     haloperidol lactate (HALDOL) injection 2 mg     hydrochlorothiazide (HYDRODIURIL) tablet 25 mg     levothyroxine (SYNTHROID/LEVOTHROID) tablet 100 mcg     losartan (COZAAR) tablet 50 mg     melatonin tablet 1 mg     naloxone (NARCAN) injection 0.1-0.4 mg     ondansetron (ZOFRAN-ODT) ODT tab 4 mg    Or     ondansetron (ZOFRAN) injection 4 mg     oxyCODONE (ROXICODONE) tablet 5 mg     pantoprazole (PROTONIX) EC tablet 20 mg     polyethylene glycol (MIRALAX) Packet 17 g     prochlorperazine (COMPAZINE) injection 5 mg    Or     prochlorperazine (COMPAZINE) tablet 5 mg    Or     prochlorperazine (COMPAZINE) suppository 12.5 mg  "    sodium chloride (PF) 0.9% PF flush 3 mL     sodium chloride 0.9% infusion             Physical Exam:   Blood pressure (!) 188/92, pulse 75, temperature 98.4  F (36.9  C), temperature source Axillary, resp. rate 18, height 1.676 m (5' 6\"), weight 69.4 kg (153 lb), SpO2 94 %, not currently breastfeeding.     L4 compression fracture is stable an non-operative management is recommended at this time.           Data:     Hemoglobin   Date Value Ref Range Status   10/10/2020 13.5 11.7 - 15.7 g/dL Final   08/06/2020 14.0 11.7 - 15.7 g/dL Final   08/05/2020 14.1 11.7 - 15.7 g/dL Final   01/27/2020 13.7 11.7 - 15.7 g/dL Final   12/28/2019 13.2 11.7 - 15.7 g/dL Final     No results found for: INR       Pt with dementia and interval altered mental status. L4 compression fracture is stable, and she will follow up with Dr. Ford in 2-3 weeks.        Bon Cedeno MD  "

## 2020-10-11 NOTE — PLAN OF CARE
Has been calm/sleeping since Haldol was given last evening and cooperative with cares.  Daughters Kelin and Eladio here last evening for a couple hours.  Repositioning and incontinence care every two hours.  At about 0345 was awake.  Asking to go home and verbally irritable with staff, not physically aggressive.  Complains of all over pain and oxycodone given.  Will monitor.

## 2020-10-11 NOTE — PROGRESS NOTES
Care Management Follow Up Note    Length of Stay (days) 0    Patient plan of care discussed at Interdisciplinary Rounds: yes  Expected Discharge Date: 10/12/20        Anticipated Discharge Disposition:  TCU vs [facility]    Per the discussion in Interdisciplinary Rounds and MD, the patient may be appropriate for [facility].  The patient will have a DEC Assessment today for possible [facility] placement.     Plan:    TCU vs [facility] placement      Amanda Tanner RN

## 2020-10-11 NOTE — PLAN OF CARE
Patient was up with TLSO brace on earlier. She reported that it was too painful to use while up in the chair. She was assisted back to bed with 2 assist and walker. TLSO brace removed while in bed. Dr. Langley explained to patient that she needed to use TLSO brace to prevent further curvature of spine. She can get up without it but should be encouraged to use it. Patient refusing to use brace. Up to bathroom with assist of one staff and walker with out TLSO brace on. She tolerated it well.

## 2020-10-11 NOTE — PROGRESS NOTES
"2 mg of IV haldol given at 1851 ordered by Dr Trujillo. Patient is resting quietly with her eyes closed at this time. Spoke with daughter Irasema and Daughter Eladio via telephone. Eladio and another daughter Kelin are on their way here to sit with Mehreen for a while. Family reported ongoing behaviors described as obsessing about little things, hallucinating and paranoia. \"one minute she can talk about normal things then it goes into bizarre statements.\"Daughter Irasema was leery about coming in stating if her mother is not on board with what's going on, she'll become defensive and not listen to her either. Irasema will have her cell phone close by tonight if staff thinks it will help Mehreen to speak with her. Family reports patient has been having \"night terrors\" and \"PTSD\" from a recent hospital stay at another facility where patient was \"restrained\". Bed alarm on, lights low, calm music playing.   "

## 2020-10-11 NOTE — PROGRESS NOTES
Discharge Planner OT   Patient plan for discharge: TCU  Current status: Min A toileting, min assist clothing management, limited stand suraj of 5 min due to c/o Lower back pain. Pt declined using TLSO.  Barriers to return to prior living situation: pt requires assist for all functional transfers and amb  Recommendations for discharge: TCU  Rationale for recommendations: increase pts ind and safety with use of AE to decrease c/o back pain with good body mech for all functional transfers and BADLs       Entered by: Delmy Rodriguez 10/11/2020 1:38 PM       10/11/20 1210   Quick Adds   Quick Adds Certification   Type of Visit Initial Occupational Therapy Evaluation   Living Environment   People in home facility resident   Current Living Arrangements assisted living   Home Accessibility no concerns   Self-Care   Equipment Currently Used at Home walker, rolling   Disability/Function   Hearing Difficulty or Deaf no   Wear Glasses or Blind no   Concentrating, Remembering or Making Decisions Difficulty no   Difficulty Communicating no   Difficulty Eating/Swallowing no   Walking or Climbing Stairs Difficulty no   Dressing/Bathing Difficulty no   Toileting no   Doing Errands Independently Difficulty (such as shopping) no   Fall history within last six months yes   Number of times patient has fallen within last six months 2   General Information   Onset of Illness/Injury or Date of Surgery 10/10/20   Referring Physician Dr Langley   Patient/Family Therapy Goal Statement (OT) decrease back pain   Additional Occupational Profile Info/Pertinent History of Current Problem Per H&P:  Mehreen Camara is a 80 year old female who  lost her footing fell and presents with hip pain and low back pain.  She reports a prior history of right hip arthroplasty about 17 years prior.  Patient reports no headache and no neck pain.  She has no abdominal pain and flank pain   Existing Precautions/Restrictions   (wear TLSO if able to tolerate)   General  Observations and Info Pt declining wearing TLSO during therapy   Cognitive Status Examination   Orientation Status person;place   Affect/Mental Status (Cognitive) confabulatory;agitated;flat/blunted affect   Follows Commands does not follow one-step commands   Visual Perception   Visual Impairment/Limitations corrective lenses for distance   Pain Assessment   Patient Currently in Pain Yes, see Vital Sign flowsheet   Posture   Posture scoliosis   Range of Motion Comprehensive   Comment, General Range of Motion WFL for BADLS   Strength Comprehensive (MMT)   General Manual Muscle Testing (MMT) Assessment no strength deficits identified   Activities of Daily Living   BADL Assessment/Intervention upper body dressing;lower body dressing;grooming;toileting   Upper Body Dressing Assessment/Training   [place] Level (Upper Body Dressing) modified independence;set up   Lower Body Dressing Assessment/Training   [place] Level (Lower Body Dressing) moderate assist (50% patient effort)   Grooming Assessment/Training   [place] Level (Grooming) contact guard assist   Position (Grooming) supported standing   Toileting   [place] Level (Toileting) minimum assist (75% patient effort)   Assistive Devices (Toileting) grab bar, toilet   Instrumental Activities of Daily Living (IADL)   IADL Comments Pt lives in [facility]   Clinical Impression   Criteria for Skilled Therapeutic Interventions Met (OT) yes   OT Diagnosis weakness   OT Problem List-Impairments impacting ADL cognition;pain;activity tolerance impaired   Assessment of Occupational Performance 3-5 Performance Deficits   Identified Performance Deficits LB dressing, toileting, toilet transfers, functional amb   Planned Therapy Interventions (OT) ADL retraining;transfer training;progressive activity/exercise   Clinical Decision Making Complexity (OT) moderate complexity   Therapy Frequency (OT) Daily   Predicted Duration of Therapy 2   Risks and Benefits of Treatment  "have been explained. Yes   Patient, Family & other staff in agreement with plan of care Yes   Comment-Clinical Impression Pt appears to be a good skilled care OT canidate for increased good body mech during ADLs with use of AE with pts limited suraj of TLSO and safety with tranfers and funcitonal amb    OT Discharge Planning    OT Discharge Recommendation (DC Rec) Transitional Care Facility   OT Rationale for DC Rec increase ind and safety with decreased c/o pain for all BADLs and functional transfers   OT Brief overview of current status  Min A toileting, CGA toilet transfers, limited standing suraj, limited LB dressing   [facility] AM-PAC TM \"6 Clicks\"   2016, Trustees of [facility], under license to [employer].  All rights reserved.   6 Clicks Short Forms Daily Activity Inpatient Short Form   [facility] AM-PAC  \"6 Clicks\" Daily Activity Inpatient Short Form   1. Putting on and taking off regular lower body clothing? 3 - A Little   2. Bathing (including washing, rinsing, drying)? 3 - A Little   3. Toileting, which includes using toilet, bedpan or urinal? 3 - A Little   4. Putting on and taking off regular upper body clothing? 4 - None   5. Taking care of personal grooming such as brushing teeth? 3 - A Little   6. Eating meals? 4 - None   Daily Activity Raw Score (Score out of 24.Lower scores equate to lower levels of function) 20   Total Evaluation Time (Minutes)   Total Evaluation Time (Minutes) 15     "

## 2020-10-12 ENCOUNTER — AMBULATORY - HEALTHEAST (OUTPATIENT)
Dept: OTHER | Facility: CLINIC | Age: 80
End: 2020-10-12

## 2020-10-12 ENCOUNTER — DOCUMENTATION ONLY (OUTPATIENT)
Dept: OTHER | Facility: CLINIC | Age: 80
End: 2020-10-12

## 2020-10-12 ENCOUNTER — TELEPHONE (OUTPATIENT)
Facility: CLINIC | Age: 80
End: 2020-10-12

## 2020-10-12 VITALS
SYSTOLIC BLOOD PRESSURE: 155 MMHG | RESPIRATION RATE: 18 BRPM | BODY MASS INDEX: 24.59 KG/M2 | OXYGEN SATURATION: 93 % | WEIGHT: 153 LBS | TEMPERATURE: 98.4 F | HEIGHT: 66 IN | HEART RATE: 80 BPM | DIASTOLIC BLOOD PRESSURE: 74 MMHG

## 2020-10-12 DIAGNOSIS — S32.040A COMPRESSION FRACTURE OF L4 VERTEBRA, INITIAL ENCOUNTER (H): ICD-10-CM

## 2020-10-12 PROCEDURE — 99239 HOSP IP/OBS DSCHRG MGMT >30: CPT | Performed by: INTERNAL MEDICINE

## 2020-10-12 PROCEDURE — 250N000013 HC RX MED GY IP 250 OP 250 PS 637: Performed by: FAMILY MEDICINE

## 2020-10-12 RX ORDER — QUETIAPINE FUMARATE 50 MG/1
25 TABLET, FILM COATED ORAL AT BEDTIME
DISCHARGE
Start: 2020-10-12 | End: 2020-11-09

## 2020-10-12 RX ORDER — QUETIAPINE FUMARATE 25 MG/1
12.5 TABLET, FILM COATED ORAL 2 TIMES DAILY PRN
DISCHARGE
Start: 2020-10-12 | End: 2020-10-23

## 2020-10-12 RX ORDER — OXYCODONE HYDROCHLORIDE 5 MG/1
5 TABLET ORAL EVERY 4 HOURS PRN
Qty: 20 TABLET | Refills: 0 | Status: SHIPPED | OUTPATIENT
Start: 2020-10-12 | End: 2020-10-12

## 2020-10-12 RX ORDER — OXYCODONE HYDROCHLORIDE 5 MG/1
5 TABLET ORAL EVERY 4 HOURS PRN
Qty: 20 TABLET | Refills: 0 | Status: SHIPPED | OUTPATIENT
Start: 2020-10-12 | End: 2020-10-17

## 2020-10-12 RX ADMIN — CARBIDOPA AND LEVODOPA 2 TABLET: 25; 100 TABLET ORAL at 10:49

## 2020-10-12 RX ADMIN — LEVOTHYROXINE SODIUM 100 MCG: 100 TABLET ORAL at 10:50

## 2020-10-12 RX ADMIN — OXYCODONE HYDROCHLORIDE 5 MG: 5 TABLET ORAL at 05:52

## 2020-10-12 RX ADMIN — ACETAMINOPHEN 1000 MG: 500 TABLET, FILM COATED ORAL at 10:48

## 2020-10-12 RX ADMIN — PANTOPRAZOLE SODIUM 20 MG: 20 TABLET, DELAYED RELEASE ORAL at 10:49

## 2020-10-12 RX ADMIN — CARBIDOPA AND LEVODOPA 2 TABLET: 25; 100 TABLET ORAL at 14:17

## 2020-10-12 RX ADMIN — CARBOXYMETHYLCELLULOSE SODIUM 2 DROP: 5 SOLUTION/ DROPS OPHTHALMIC at 10:49

## 2020-10-12 RX ADMIN — ACETAMINOPHEN 1000 MG: 500 TABLET, FILM COATED ORAL at 14:17

## 2020-10-12 RX ADMIN — BISACODYL 5 MG: 5 TABLET, COATED ORAL at 10:49

## 2020-10-12 RX ADMIN — DULOXETINE HYDROCHLORIDE 60 MG: 30 CAPSULE, DELAYED RELEASE ORAL at 10:49

## 2020-10-12 RX ADMIN — GABAPENTIN 300 MG: 300 CAPSULE ORAL at 10:49

## 2020-10-12 RX ADMIN — OXYCODONE HYDROCHLORIDE 5 MG: 5 TABLET ORAL at 14:17

## 2020-10-12 RX ADMIN — GABAPENTIN 300 MG: 300 CAPSULE ORAL at 14:17

## 2020-10-12 RX ADMIN — HYDROCHLOROTHIAZIDE 25 MG: 25 TABLET ORAL at 10:48

## 2020-10-12 RX ADMIN — LOSARTAN POTASSIUM 50 MG: 50 TABLET, FILM COATED ORAL at 10:49

## 2020-10-12 RX ADMIN — CARBOXYMETHYLCELLULOSE SODIUM 2 DROP: 5 SOLUTION/ DROPS OPHTHALMIC at 14:17

## 2020-10-12 ASSESSMENT — ACTIVITIES OF DAILY LIVING (ADL)
ADLS_ACUITY_SCORE: 22
ADLS_ACUITY_SCORE: 18
ADLS_ACUITY_SCORE: 24
ADLS_ACUITY_SCORE: 24
ADLS_ACUITY_SCORE: 18

## 2020-10-12 NOTE — PROGRESS NOTES
Care Management Discharge Note    Discharge Planning:  Expected Discharge Date: 10/12/20     Concerns to be Addressed: all concerns addressed in this encounter       Anticipated Discharge Disposition: [facility] Phone (Main Phone:[phone] Admissions Phone:[phone] Fax: [phone]).    Family to transport at 330 PM.  DaughterEladio will come to pt's room by 3:30 PM.    Anticipated Discharge Services:  TCU cares  Anticipated Discharge DME: Walker    Patient/family educated on Medicare website which has current facility and service quality ratings: yes  Referrals Placed by CM/SW: Post Acute Facilities  Education Provided on the Discharge Plan:  Yes,  Patient/Family in Agreement with the Plan: Yes, spoke with pt's daughterEladio re: discharge plan of care.     Disposition Comments:    PAS-RR  Per DHS regulation, CTS team completed and submitted PAS-RR to [place] [employer] Direct Connect via the [employer]. CTS team advised SNF and they are aware a PAS-RR has been submitted.     CTS team reviewed with pt or health care agent that they may be contacted for a follow up appointment within 10 days of hospital discharge if SNF stay is <30 days. Contact information for [employer] was also provided.     Pt or health care agent verbalized understanding.     PAS-RR # [id number]    Additional Information:  Care Transitions staff discussed current COVID 19 pandemic and Medicare waiver of the traditional 3 day stay requirement. Referred patient to medicare.gov, insurance provider, and admissions department at accepting facility for further questions or concerns on coverage for SNF stay.  1.  This patient has been evacuated from a nursing home in the emergency area? NO  2.  This patient is being discharged from a hospital (in the emergency area) in order to provide care to more seriously ill patients? YES  3.  This patient needs SNF care as a result of the emergency, regardless of  whether he/she was in a hospital/nursing home prior to this stay? NO    COVID-19 testing requested by facility prior to accepting patient for admission YES  Discharging provider advised YES    NAM Boyd

## 2020-10-12 NOTE — PLAN OF CARE
Alert and appropriate last evening.  Used call light, up to bathroom with walker and assist of 1.  Moved very well.  Slept well most of NOC, Seroquel was given at HS.  Was awake for about a half an hour and was confused, verbalized she thought she was riding in a car and wanted to park it.  Redirected to situation and was able to fall back asleep.  Bed alarm on for safety.  VSS.  Oxycodone given for allover body aches.

## 2020-10-12 NOTE — DISCHARGE SUMMARY
[facility] Hospitalist Discharge Summary    Mehreen Camara MRN# [id number]   Age: 80 year old YOB: 1940     Date of Admission:  10/10/2020  Date of Discharge::  10/12/2020  Admitting Physician:  Cameron Langley MD  Discharge Physician:  Vida Valle MD  Primary Physician: Cathy Sargent  Transferring Facility: N/A     Home clinic: [facility]          Admission Diagnoses:   Fall, initial encounter [W19.XXXA]  Compression fracture of L4 vertebra, initial encounter (H) [S32.040A]          Discharge Diagnosis:     Principle diagnosis: fall with L4 compression fx  Secondary diagnoses:  Patient Active Problem List   Diagnosis     Adjustment disorder with anxious mood     Adjustment disorder with mixed anxiety and depressed mood     Adrenal adenoma     Aneurysm of abdominal vessel (H)     Harris's esophagus     Bradycardia     Cataract     Major depressive disorder, recurrent episode (H)     Constipation     Dizziness     Dyslipidemia     Esophageal reflux     Hypertension     Hypothyroidism     Insomnia due to anxiety and fear     Lower back pain     Osteoarthrosis     Disorder of bone and cartilage     Weakness     Lower extremity weakness     Orthostatic hypotension dysautonomic syndrome (H)     Primary insomnia     Disorder of bursae and tendons in shoulder region     S/P AAA repair using bifurcation graft     Urinary incontinence in female     Movement disorder     Rheumatic aortic stenosis     Multiple system atrophy P (H)     Post-op pain     S/P laparoscopic cholecystectomy     Flatulence, eructation, and gas pain     Rupture of left Achilles tendon, sequela     Abdominal pain, generalized     Fall     Bilateral knee pain     Generalized muscle weakness     Depression     REM sleep behavior disorder     Compression fracture of fourth lumbar vertebra (H)     Compression fracture of L4 vertebra, initial encounter (H)     Compression fracture of L4 lumbar vertebra, closed, initial  encounter (H)          Brief History of Presenting Illness:   As per admit hx  Mehreen Camara is a 80 year old female who  lost her footing fell and presents with hip pain and low back pain.  She reports a prior history of right hip arthroplasty about 17 years prior.  Patient reports no headache and no neck pain.  She has no abdominal pain and flank pain.            Hospital Course:   L4 compression fracture with severe progressive scoliosis   -This occurred after mechanical fall  - Per Ortho they would like her to have a TLSO brace which will be fitted tonight and apply applied tomorrow.  Discussed with Dr. Moyer of orthospine, he feels if she will not wear it it is not essential for the safety of this fracture, but is useful to prevent progressive scoliosis which is being seen on her serial CTs  Tylenol thousand milligrams 3 times daily plus oxycodone 5 mg every 3 hours as needed pain  From a mental standpoint she is going to tolerate this poorly.  Is needing significant assistance at this time.  -She is to follow with Dr. Ford in 2 to 3 weeks as an outpatient  - Will encourage the TLSO brace, but not push it if she does not want to wear it     Hypertension  -Blood pressures have been running very high here but now below's 170 systolic.  - No change in her blood pressure medications at this time.  Continue losartan and HCTZ     Anxiety/depression/mirta psychosis  Continue her duloxetine and BuSpar  - She is quite anxious here and can be extremely demanding.    At times she is completely non-directable, screaming at nurses, throwing things, slamming the telephone on the table.  - She got Haldol 2 mg the evening of 10/10  - Long discussion with daughter Irasema, she has been gradually ramping up these behaviors in the assisted living, and having associated hallucinations/delusions where she thinks there is various characters in her bed and she will sleep in her chair instead.  She initially could realize these  were not real but no longer can differentiate  Was started on seroquel 50mg at bedtime and 25 mg prn bid. Pt got 50mg last night-somnolant this AM. Also getting oxycodone q3hr prn. Will discharge on seroquel 25mg at bedtime and 12.5mg bid prn.     Mild cognitive impairment versus mirta Alzheimer's dementia  -She  certainly has some confusion here, though she knows the date, day, where she lives, but cannot name this hospital.  Stable.      Aortic stenosis  -This is mild per echo in 2018, no murmurs or reason to suspect it has gotten significantly worse      Dispo  Pt will go back top [facility] today-apparently they are comfortable handling her there. Hence DEC assessment was canceled               Procedures:   No procedures performed during this admission         Allergies:      Allergies   Allergen Reactions     Penicillins Anaphylaxis, Rash and Shortness Of Breath     Aspirin Nausea and GI Disturbance     Atenolol Cough     Atorvastatin Muscle Pain (Myalgia) and Nausea and Vomiting     Bupropion Nausea     Clindamycin Other (See Comments)     Constipation      Codeine Nausea     Ibuprofen Nausea     Stomach upset     Lovastatin Muscle Pain (Myalgia)     Cephalexin Rash     Neck reddness             Medications Prior to Admission:     Medications Prior to Admission   Medication Sig Dispense Refill Last Dose     acetaminophen (TYLENOL) 500 MG tablet Take 500 mg by mouth 3 times daily    10/9/2020 at 1745     bisacodyl (DULCOLAX) 10 MG suppository Place 10 mg rectally daily as needed for constipation   9/12/2020 at Unknown time     bisacodyl (DULCOLAX) 5 MG EC tablet Take 5 mg by mouth daily (and a second time if needed)   10/9/2020 at 0600     carbidopa-levodopa (SINEMET)  MG tablet TAKE 2 TABLETS BY MOUTH THREE TIMES DAILY 180 tablet 11 10/9/2020 at 1745     DULoxetine (CYMBALTA) 20 MG capsule Take 60 mg by mouth daily   10/9/2020 at 0600     gabapentin (NEURONTIN) 100 MG capsule Take 300 mg by mouth 3  times daily    10/9/2020 at 1745     hydrochlorothiazide (HYDRODIURIL) 25 MG tablet TAKE 1 TABLET BY MOUTH EVERY DAY DX HYPERTENSION AND LE EDEMA 31 tablet 11 10/9/2020 at 0600     levothyroxine (SYNTHROID/LEVOTHROID) 100 MCG tablet Take 1 tablet (100 mcg) by mouth daily   10/9/2020 at 0600     losartan (COZAAR) 50 MG tablet TAKE TABLET BY MOUTH EVERY MORNING 30 tablet 11 10/9/2020 at 0600     pantoprazole (PROTONIX) 20 MG EC tablet Take 20 mg by mouth daily   10/9/2020 at 0600     Polyethylene Glycol 3350 (MIRALAX PO) Take 1 capful by mouth daily 17 GM   10/9/2020 at 0600     polyethylene glycol-propylene glycol (SYSTANE ULTRA) 0.4-0.3 % SOLN ophthalmic solution Place 2 drops into both eyes 3 times daily (and additionally as needed/requested)   10/9/2020 at 1745     vitamin D3 (CHOLECALCIFEROL) 2000 units (50 mcg) tablet Take 2,000 Units by mouth daily        VITAMIN D3 25 MCG (1000 UT) tablet TAKE 2 TABLETS (2000IU) BY MOUTH EVERY DAY DX OSTEOPOROSIS 60 tablet 11 10/9/2020 at 0600     Zinc Oxide 13 % CREA Apply topically daily     at Unknown time     busPIRone (BUSPAR) 5 MG tablet Take 5 mg by mouth 2 times daily as needed for anxiety   More than a month at Unknown time     calcium carbonate (TUMS) 500 MG chewable tablet Take 1 tablet (500 mg) by mouth every 2 hours as needed for heartburn 150 tablet 1 More than a month at Unknown time     clindamycin (CLEOCIN) 300 MG capsule Take 600 mg by mouth as needed Give 1 hour prior to dental appoitments   Unknown at Unknown time     clotrimazole (LOTRIMIN) 1 % external cream Apply topically 2 times daily as needed   Unknown at Unknown time     diclofenac (VOLTAREN) 1 % topical gel Apply 4 g topically 4 times daily as needed for moderate pain (to back and neck) 100 g 11 More than a month at Unknown time     GAS RELIEF 125 MG CAPS TAKE 1 CAPSULE BY MOUTH FOUR TIMES DAILY AS NEEDED WITH MEALS 30 capsule 11 Unknown at Unknown time     magnesium citrate solution Take 296 mLs  by mouth as needed for other        [DISCONTINUED] HYDROcodone-acetaminophen (NORCO) 5-325 MG tablet TAKE 1 TABLET BY MOUTH EVERY 6 HOURS AS NEEDED FOR PAIN (MAX APAP 4GM/24HRS) 60 tablet 0 10/9/2020 at 1113             Discharge Medications:     Current Discharge Medication List      START taking these medications    Details   oxyCODONE (ROXICODONE) 5 MG tablet Take 1 tablet (5 mg) by mouth every 4 hours as needed  Qty: 20 tablet, Refills: 0    Associated Diagnoses: Compression fracture of L4 vertebra, initial encounter (H)      !! QUEtiapine (SEROQUEL) 25 MG tablet Take 0.5 tablets (12.5 mg) by mouth 2 times daily as needed (agitation, delusions)    Associated Diagnoses: Adjustment disorder with anxious mood      !! QUEtiapine (SEROQUEL) 50 MG tablet Take 0.5 tablets (25 mg) by mouth At Bedtime    Associated Diagnoses: Adjustment disorder with anxious mood       !! - Potential duplicate medications found. Please discuss with provider.      CONTINUE these medications which have NOT CHANGED    Details   acetaminophen (TYLENOL) 500 MG tablet Take 500 mg by mouth 3 times daily       bisacodyl (DULCOLAX) 10 MG suppository Place 10 mg rectally daily as needed for constipation      bisacodyl (DULCOLAX) 5 MG EC tablet Take 5 mg by mouth daily (and a second time if needed)      carbidopa-levodopa (SINEMET)  MG tablet TAKE 2 TABLETS BY MOUTH THREE TIMES DAILY  Qty: 180 tablet, Refills: 11    Associated Diagnoses: Multiple system atrophy (H)      DULoxetine (CYMBALTA) 20 MG capsule Take 60 mg by mouth daily      gabapentin (NEURONTIN) 100 MG capsule Take 300 mg by mouth 3 times daily       hydrochlorothiazide (HYDRODIURIL) 25 MG tablet TAKE 1 TABLET BY MOUTH EVERY DAY DX HYPERTENSION AND LE EDEMA  Qty: 31 tablet, Refills: 11    Associated Diagnoses: Essential hypertension      levothyroxine (SYNTHROID/LEVOTHROID) 100 MCG tablet Take 1 tablet (100 mcg) by mouth daily  Qty:      Associated Diagnoses: Hypothyroidism,  unspecified type      losartan (COZAAR) 50 MG tablet TAKE TABLET BY MOUTH EVERY MORNING  Qty: 30 tablet, Refills: 11    Associated Diagnoses: Essential hypertension      pantoprazole (PROTONIX) 20 MG EC tablet Take 20 mg by mouth daily      Polyethylene Glycol 3350 (MIRALAX PO) Take 1 capful by mouth daily 17 GM      polyethylene glycol-propylene glycol (SYSTANE ULTRA) 0.4-0.3 % SOLN ophthalmic solution Place 2 drops into both eyes 3 times daily (and additionally as needed/requested)      !! vitamin D3 (CHOLECALCIFEROL) 2000 units (50 mcg) tablet Take 2,000 Units by mouth daily      !! VITAMIN D3 25 MCG (1000 UT) tablet TAKE 2 TABLETS (2000IU) BY MOUTH EVERY DAY DX OSTEOPOROSIS  Qty: 60 tablet, Refills: 11    Associated Diagnoses: Multiple system atrophy (H)      Zinc Oxide 13 % CREA Apply topically daily       busPIRone (BUSPAR) 5 MG tablet Take 5 mg by mouth 2 times daily as needed for anxiety      calcium carbonate (TUMS) 500 MG chewable tablet Take 1 tablet (500 mg) by mouth every 2 hours as needed for heartburn  Qty: 150 tablet, Refills: 1    Associated Diagnoses: Nausea      clindamycin (CLEOCIN) 300 MG capsule Take 600 mg by mouth as needed Give 1 hour prior to dental appoitments      clotrimazole (LOTRIMIN) 1 % external cream Apply topically 2 times daily as needed      diclofenac (VOLTAREN) 1 % topical gel Apply 4 g topically 4 times daily as needed for moderate pain (to back and neck)  Qty: 100 g, Refills: 11    Associated Diagnoses: Primary osteoarthritis involving multiple joints      GAS RELIEF 125 MG CAPS TAKE 1 CAPSULE BY MOUTH FOUR TIMES DAILY AS NEEDED WITH MEALS  Qty: 30 capsule, Refills: 11    Associated Diagnoses: Flatulence, eructation, and gas pain      magnesium citrate solution Take 296 mLs by mouth as needed for other       !! - Potential duplicate medications found. Please discuss with provider.      STOP taking these medications       HYDROcodone-acetaminophen (NORCO) 5-325 MG tablet  "Comments:   Reason for Stopping:                     Consultations:   No consultations were requested during this admission            Discharge Exam:   Blood pressure (!) 155/74, pulse 80, temperature 98.4  F (36.9  C), temperature source Oral, resp. rate 18, height 1.676 m (5' 6\"), weight 69.4 kg (153 lb), SpO2 93 %, not currently breastfeeding.  GENERAL APPEARANCE: healthy, alert and no distress  EYES: conjunctiva clear, eyes grossly normal  HENT: external ears and nose normal   NECK: supple, no masses or adenopathy  RESP: lungs clear to auscultation - no rales, rhonchi or wheezes  CV: regular rate and rhythm, normal S1 S2, no S3 or S4 and no murmur, click or rub   ABDOMEN: soft, nontender, no HSM or masses and bowel sounds normal  MS: no clubbing, cyanosis; no edema  SKIN: clear without significant rashes or lesions  NEURO: non-focal, moves all 4 extr    Unresulted Labs Ordered in the Past 30 Days of this Admission     No orders found from 9/10/2020 to 10/11/2020.          No results found for this or any previous visit (from the past 24 hour(s)).         Pending Tests at Discharge:   None         Discharge Instructions and Follow-Up:     Discharge diet: Regular   Discharge activity: Activity as tolerated   Discharge follow-up: F/u dr matthews in 2-3 weeks           Discharge Disposition:     Discharged to short-term care facility      Attestation:  I have reviewed today's vital signs, notes, medications, labs and imaging.    Time Spent on this Encounter   I, Vida Valle MD, personally saw the patient today and spent greater than 30 minutes discharging this patient.    Vida Valle MD       "

## 2020-10-12 NOTE — PLAN OF CARE
Physical Therapy Discharge Summary    Reason for therapy discharge:    Discharged to transitional care facility.    Progress towards therapy goal(s). See goals on Care Plan in Psychiatric electronic health record for goal details.  Goals not met.  Barriers to achieving goals:   discharge from facility.    Therapy recommendation(s):    Continued therapy is recommended.  Rationale/Recommendations:  strengthening, transfers and gait training.    Sarah Beth Dowd PT

## 2020-10-12 NOTE — PROGRESS NOTES
[facility] DISCHARGE NOTE    Patient discharged to [facility] transitional care unit at 4:05 PM via wheel chair. Accompanied by daughter and staff. Discharge instructions reviewed with patient, daughter and report was called to [facility] RN, opportunity offered to ask questions. Prescriptions sent with patient to fill. All belongings sent with patient.    Diya Moffett RN

## 2020-10-12 NOTE — PROGRESS NOTES
Pt vss.  Ambulated to bathroom with assist of 1.  Oxycodone given for pain in neck/stomach/legs and back.  Tolerating food and fluids.  Alarms on.  Cooperative from 3-7pm.

## 2020-10-12 NOTE — PLAN OF CARE
Occupational Therapy Discharge Summary    Reason for therapy discharge:    Discharged to transitional care facility.    Progress towards therapy goal(s). See goals on Care Plan in [facility] electronic health record for goal details.  Goals partially met.  Barriers to achieving goals:   discharge from facility.    Therapy recommendation(s):    Continued therapy is recommended.  Rationale/Recommendations:  TCU to increase independence/safety with ADLs and related mobility.

## 2020-10-12 NOTE — PROGRESS NOTES
Writer called daughter Eladio Dowd , daughter, discussed plan for a DEC assessment. Eladio did not want it done until 10/12/2020. She did provide us with telephone consent.

## 2020-10-13 ENCOUNTER — VIRTUAL VISIT (OUTPATIENT)
Dept: GERIATRICS | Facility: CLINIC | Age: 80
End: 2020-10-13
Payer: COMMERCIAL

## 2020-10-13 VITALS
OXYGEN SATURATION: 98 % | TEMPERATURE: 98.1 F | WEIGHT: 155.3 LBS | HEART RATE: 78 BPM | DIASTOLIC BLOOD PRESSURE: 57 MMHG | SYSTOLIC BLOOD PRESSURE: 89 MMHG | BODY MASS INDEX: 25.07 KG/M2 | RESPIRATION RATE: 16 BRPM

## 2020-10-13 DIAGNOSIS — G31.83 LEWY BODY DEMENTIA WITHOUT BEHAVIORAL DISTURBANCE (H): Primary | ICD-10-CM

## 2020-10-13 DIAGNOSIS — M15.0 PRIMARY OSTEOARTHRITIS INVOLVING MULTIPLE JOINTS: ICD-10-CM

## 2020-10-13 DIAGNOSIS — F41.1 GAD (GENERALIZED ANXIETY DISORDER): ICD-10-CM
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
## 2020-10-13 DIAGNOSIS — G23.2 MULTIPLE SYSTEM ATROPHY P (H): ICD-10-CM

## 2020-10-13 DIAGNOSIS — Z79.899 POLYPHARMACY: ICD-10-CM

## 2020-10-13 DIAGNOSIS — F02.80 LEWY BODY DEMENTIA WITHOUT BEHAVIORAL DISTURBANCE (H): Primary | ICD-10-CM

## 2020-10-13 DIAGNOSIS — R41.89 COGNITIVE IMPAIRMENT: ICD-10-CM

## 2020-10-13 DIAGNOSIS — F33.9 EPISODE OF RECURRENT MAJOR DEPRESSIVE DISORDER, UNSPECIFIED DEPRESSION EPISODE SEVERITY (H): ICD-10-CM

## 2020-10-13 DIAGNOSIS — S32.040D CLOSED COMPRESSION FRACTURE OF L4 LUMBAR VERTEBRA, WITH ROUTINE HEALING, SUBSEQUENT ENCOUNTER: ICD-10-CM

## 2020-10-13 PROCEDURE — 99305 1ST NF CARE MODERATE MDM 35: CPT | Mod: 95 | Performed by: NURSE PRACTITIONER

## 2020-10-13 NOTE — LETTER
"    10/13/2020        RE: Mehreen Camara  [facility]  [address]  [facility] [address]        [facility] Geriatrics Video Visit  Mehreen Camara is a 80 year old female who is being evaluated via a billable video visit due to the restrictions of the COVID19 pandemic.The patient has been notified of following: \"This video visit will be conducted via a call between you and your provider. We have found that certain health care needs can be provided without the need for an in-person physical exam.  This service lets us provide the care you need with a video conversation.  If a prescription is necessary we can send it directly to your pharmacy.  If lab work is needed we can place an order for that and you can then stop by our lab to have the test done at a later time. If during the course of the call the provider feels a video visit is not appropriate, you will not be charged for this service.\"     Proivder has received verbal consent for a Video Visit from the patient? Yes    Patient/facility would like the video invitation sent by: Send to e-mail at: [email or web address]    This visit took place virtually, with the patient located at [facility].  ________________________________________________  Video Start Time: 1335  TCU Admission  PRIMARY CARE PROVIDER AND CLINIC:  Lisa Parry, APRN CNP DNP, [address] [place] [address] / [facility] [address]  Chief Complaint   Patient presents with     Video Visit     [facility] F/U   [place] MRN: [id number]. Mehreen Camara  is a 80 year old  (1940), admitted to the above facility from  [facility]. Hospital stay 10/10 through 10/12. Admitted to this facility for  rehab, medical management and nursing care. HPI information obtained from: facility chart records, facility staff, patient report, [facility] chart review and Care Everywhere Epic chart review.     Brief Summary of Hospital Course: " Mehreen presented to [facility] on 10/10 after a mechanical fall in [place]. She was found to have an acute L4 compression fracture in the presence of scoliosis. She was treated conservatively w/ TLSO. While inpatient, she was her usual demanding, non-directable, anxious, self. She was throwing things, yelling at staff, and skinny-psych was considered. Seroquel was started. She was weaned from norco and given Oxycodone for acute pain relief. She will follow-up w/ ortho in 2-3 weeks.     Updates since admission to transitional care unit: Mehreen presented to TCU on 10/12 s/p the above hospitalization where her dementia was exacerbated. Provider coordinated care with neurology, who agrees that this patient has Lewy Body Dementia. We are in agreement w/ Seroquel start and continuation. Mehreen states her pain is well-controlled, and she feels a little tired. She denies bowel/bladder problems. She denies headache, dizziness, fever, SOB, CP, palpitations. In truth, we have never seen her this calm and coherent. She clearly denied any hallucinations and did not verbalize delusions. We note some soft BPs on chart review.    CODE STATUS/ADVANCE DIRECTIVES DISCUSSION: DNR/DNI.  Patient's living condition: lives in an assisted living facility. ALLERGIES: Penicillins, Aspirin, Atenolol, Atorvastatin, Bupropion, Clindamycin, Codeine, Ibuprofen, Lovastatin, and CephalexinPAST MEDICAL HISTORY:  has a past medical history of Adrenal adenoma, Arthritis, Depressive disorder, Hypertension, Multiple system atrophy P (H), Rheumatic fever, Small bowel obstruction (H), and Thyroid disease.PAST SURGICAL HISTORY:   has a past surgical history that includes orthopedic surgery; Thyroidectomy (2011); joint replacement (Right, 2003); Hysterectomy (2013); carpal tunnel release rt/lt (Bilateral, 2013); Spine surgery (1981); aaa repair (2015); and Laparoscopic cholecystectomy (N/A, 12/12/2018).FAMILY HISTORY: family history includes Coronary Artery Disease in  her father and mother; Hypertension in her mother; Other Cancer in her brother; Parkinsonism in an other family member.SOCIAL HISTORY:   reports that she quit smoking about 25 years ago. Her smoking use included cigarettes. She smoked 0.50 packs per day. She has never used smokeless tobacco. She reports that she does not drink alcohol or use drugs.  Post Discharge Medication Reconciliation Status: discharge medications reconciled and changed, per note/orders  Current Outpatient Medications   Medication Sig Dispense Refill     acetaminophen (TYLENOL) 500 MG tablet Take 500 mg by mouth 3 times daily        bisacodyl (DULCOLAX) 10 MG suppository Place 10 mg rectally daily as needed for constipation       bisacodyl (DULCOLAX) 5 MG EC tablet Take 5 mg by mouth daily (and a second time if needed)       busPIRone (BUSPAR) 5 MG tablet Take 5 mg by mouth 2 times daily as needed for anxiety       calcium carbonate (TUMS) 500 MG chewable tablet Take 1 tablet (500 mg) by mouth every 2 hours as needed for heartburn 150 tablet 1     carbidopa-levodopa (SINEMET)  MG tablet TAKE 2 TABLETS BY MOUTH THREE TIMES DAILY 180 tablet 11     clindamycin (CLEOCIN) 300 MG capsule Take 600 mg by mouth as needed Give 1 hour prior to dental appoitments       clotrimazole (LOTRIMIN) 1 % external cream Apply topically 2 times daily as needed       diclofenac (VOLTAREN) 1 % topical gel Apply 4 g topically 4 times daily as needed for moderate pain (to back and neck) 100 g 11     DULoxetine (CYMBALTA) 20 MG capsule Take 60 mg by mouth daily       gabapentin (NEURONTIN) 100 MG capsule Take 300 mg by mouth 3 times daily        GAS RELIEF 125 MG CAPS TAKE 1 CAPSULE BY MOUTH FOUR TIMES DAILY AS NEEDED WITH MEALS 30 capsule 11     hydrochlorothiazide (HYDRODIURIL) 25 MG tablet TAKE 1 TABLET BY MOUTH EVERY DAY DX HYPERTENSION AND LE EDEMA 31 tablet 11     levothyroxine (SYNTHROID/LEVOTHROID) 100 MCG tablet Take 1 tablet (100 mcg) by mouth daily        losartan (COZAAR) 50 MG tablet TAKE TABLET BY MOUTH EVERY MORNING 30 tablet 11     magnesium citrate solution Take 296 mLs by mouth as needed for other       oxyCODONE (ROXICODONE) 5 MG tablet Take 1 tablet (5 mg) by mouth every 4 hours as needed 20 tablet 0     pantoprazole (PROTONIX) 20 MG EC tablet Take 20 mg by mouth daily       Polyethylene Glycol 3350 (MIRALAX PO) Take 1 capful by mouth daily 17 GM       polyethylene glycol-propylene glycol (SYSTANE ULTRA) 0.4-0.3 % SOLN ophthalmic solution Place 2 drops into both eyes 3 times daily (and additionally as needed/requested)       QUEtiapine (SEROQUEL) 25 MG tablet Take 0.5 tablets (12.5 mg) by mouth 2 times daily as needed (agitation, delusions)       QUEtiapine (SEROQUEL) 50 MG tablet Take 0.5 tablets (25 mg) by mouth At Bedtime       VITAMIN D3 25 MCG (1000 UT) tablet TAKE 2 TABLETS (2000IU) BY MOUTH EVERY DAY DX OSTEOPOROSIS 60 tablet 11     Zinc Oxide 13 % CREA Apply topically daily        ROS: Limited secondary to cognitive impairment but today pt reports the above and 10 point ROS of systems including Constitutional, Eyes, Respiratory, Cardiovascular, Gastroenterology, Genitourinary, Integumentary, Musculoskeletal, Psychiatric were all negative except for pertinent positives noted in my HPI.    Vitals:  BP (!) 89/57   Pulse 78   Temp 98.1  F (36.7  C)   Resp 16   Wt 70.4 kg (155 lb 4.8 oz)   SpO2 98%   BMI 25.07 kg/m    Exam:  GENERAL APPEARANCE: Alert, in no distress, cooperative.   EYES/ENT: EOM normal on camera, hearing acuity [place].  RESP: Respiratory effort appears unlabored over video screen, no respiratory distress apparent on video, On RA.   CV: Edema appears 0+ BLE via video. Color appears pale.  ABDOMEN: Appears non-distended.  SKIN: Skin appears baseline w/ video inspection. No wounds/rashes noted.  NEURO: CN II-XII at patient's baseline, MARMOLEJO at baseline on video. Sensation baseline PPS.  PSYCH: Insight, judgement, and memory are impaired at  baseline, affect and mood are calm/confused.    Lab/Diagnostic data:  CBC RESULTS:   Recent Labs   Lab Test 10/10/20  0415 08/06/20  0712   WBC 5.3 5.1   RBC 4.32 4.38   HGB 13.5 14.0   HCT 41.6 42.7   MCV 96 98   MCH 31.3 32.0   MCHC 32.5 32.8   RDW 11.4 11.4    190   Last Basic Metabolic Panel:  Recent Labs   Lab Test 10/10/20  0415 08/06/20  0712    139   POTASSIUM 3.5 3.4   CHLORIDE 104 103   YADIRA 8.4* 8.8   CO2 32 36*   BUN 15 13   CR 0.55 0.51*   * 107*   Liver Function Studies -   Recent Labs   Lab Test 01/27/20  1722 12/28/19  1408   PROTTOTAL 7.5 7.4   ALBUMIN 3.0* 3.6   BILITOTAL 0.4 0.7   ALKPHOS 73 64   AST 21 24   ALT 16 21       ASSESSMENT/PLAN:  Lewy body dementia without behavioral disturbance (H)  VAMSHI (generalized anxiety disorder)  Multiple system atrophy P (H)  Episode of recurrent major depressive disorder, unspecified depression episode severity (H)  Closed compression fracture of L4 lumbar vertebra, with routine healing, subsequent encounter  Fall, subsequent encounter  Primary osteoarthritis involving multiple joints  Cognitive impairment  Polypharmacy  Acute-on-chronic. Ongoing.     We appreciate the work done inpatient to educate family regarding dementia, and Seroquel is helping. We will continue the PRNs from hospitalization. Continuation of PRN order for non-antipsychotic psychotropic medication (Buspar), and psychotropic  Seroquel, is appropriate due to occasional insomnia, sporadic anxiety and delrium and is being reordered today.     Given these medication adjustments and formalizing dementia diagnosis w/ neurology, we will consult in-house psych to continue to support Mehreen.     We note that prior to hospitalization, Mehreen's inappropriate use of Norco precluded her from taking more Tylenol. Given she has been weaned, we will schedile TID Tylenol @ 1000mg.    We note that with anxiety better controlled, we may be able to reduce polypharmacy. Will start w/ PPI  reduction. No hx of PUD/GIB, or esophagitis. We will also discontinue PRN Tums, as this is available via BEHZAD.     Provider coordinated care with nursing, neurology, and [facility] pharmacist to optimize care plan around Mehreen's dementia treatment.   Follow-up w/in 1 week or as needed.    Orders:  1. Clarification. Buspar PRN x 14 days. Dx: VAMSHI.  2. Clarification Seroquel PRN x 14 days. Dx: paranoid delusions/atypical psychosis.  3. In-house psych x1. Dx: paranoia.   4. Change Tylenol to 1000mg PO TID. Dx: OA.   5. Decrease Protonix to 20mg M/W/F. Dx: GERD.   6. Discontinue PRN Tums.  FYI: Next visit, consider discontinue of PPI, mag citrate, Lotrimin, Zinc cream, Simethicone.    Total time spent with patient visit at the [facility] nursing facility was 35 min including patient visit and review of past records. Greater than 50% of total time spent with counseling and coordinating care with neurology, nursing, and pharmacists as noted above.     Video-Visit Details  Type of service:  Video Visit  Video Start Time: 1335  Video End Time (time video stopped): 1343  Originating Location (pt. Location): [facility]  Distant Location (provider location):  [facility] GERIATRIC TRANSITIONAL CARE   Mode of Communication:  Video Conference.    Electronically signed by:  HAILEE Gilbert CNP DNP            Sincerely,        HAILEE Langley CNP

## 2020-10-13 NOTE — TELEPHONE ENCOUNTER
Called for prescription, patient was admitted to [facility] without RX signed.     RX done.     Unique Britt, CNP

## 2020-10-13 NOTE — PROGRESS NOTES
"[facility] Geriatrics Video Visit  Mehreen Camara is a 80 year old female who is being evaluated via a billable video visit due to the restrictions of the COVID19 pandemic.The patient has been notified of following: \"This video visit will be conducted via a call between you and your provider. We have found that certain health care needs can be provided without the need for an in-person physical exam.  This service lets us provide the care you need with a video conversation.  If a prescription is necessary we can send it directly to your pharmacy.  If lab work is needed we can place an order for that and you can then stop by our lab to have the test done at a later time. If during the course of the call the provider feels a video visit is not appropriate, you will not be charged for this service.\"     Proivder has received verbal consent for a Video Visit from the patient? Yes    Patient/facility would like the video invitation sent by: Send to e-mail at: [email or web address]    This visit took place virtually, with the patient located at [facility].  ________________________________________________  Video Start Time: 1335  TCU Admission  PRIMARY CARE PROVIDER AND CLINIC:  Lisa Parry, APRN CNP DNP, [address] / [place] [address]  Chief Complaint   Patient presents with     Video Visit     [facility] F/U   [place] MRN: [id number]. Mehreen Camara  is a 80 year old  (1940), admitted to the above facility from  [facility]. Hospital stay 10/10 through 10/12. Admitted to this facility for  rehab, medical management and nursing care. HPI information obtained from: facility chart records, facility staff, patient report, [facility] chart review and Care Everywhere [facility] chart review.     Brief Summary of Hospital Course: Mehreen presented to [facility] on 10/10 after a mechanical fall in [place]. She was found to have an acute L4 compression fracture in " the presence of scoliosis. She was treated conservatively w/ TLSO. While inpatient, she was her usual demanding, non-directable, anxious, self. She was throwing things, yelling at staff, and skinny-psych was considered. Seroquel was started. She was weaned from norco and given Oxycodone for acute pain relief. She will follow-up w/ ortho in 2-3 weeks.     Updates since admission to transitional care unit: Mehreen presented to TCU on 10/12 s/p the above hospitalization where her dementia was exacerbated. Provider coordinated care with neurology, who agrees that this patient has Lewy Body Dementia. We are in agreement w/ Seroquel start and continuation. Mehreen states her pain is well-controlled, and she feels a little tired. She denies bowel/bladder problems. She denies headache, dizziness, fever, SOB, CP, palpitations. In truth, we have never seen her this calm and coherent. She clearly denied any hallucinations and did not verbalize delusions. We note some soft BPs on chart review.    CODE STATUS/ADVANCE DIRECTIVES DISCUSSION: DNR/DNI.  Patient's living condition: lives in an assisted living facility. ALLERGIES: Penicillins, Aspirin, Atenolol, Atorvastatin, Bupropion, Clindamycin, Codeine, Ibuprofen, Lovastatin, and CephalexinPAST MEDICAL HISTORY:  has a past medical history of Adrenal adenoma, Arthritis, Depressive disorder, Hypertension, Multiple system atrophy P (H), Rheumatic fever, Small bowel obstruction (H), and Thyroid disease.PAST SURGICAL HISTORY:   has a past surgical history that includes orthopedic surgery; Thyroidectomy (2011); joint replacement (Right, 2003); Hysterectomy (2013); carpal tunnel release rt/lt (Bilateral, 2013); Spine surgery (1981); aaa repair (2015); and Laparoscopic cholecystectomy (N/A, 12/12/2018).FAMILY HISTORY: family history includes Coronary Artery Disease in her father and mother; Hypertension in her mother; Other Cancer in her brother; Parkinsonism in an other family member.SOCIAL  HISTORY:   reports that she quit smoking about 25 years ago. Her smoking use included cigarettes. She smoked 0.50 packs per day. She has never used smokeless tobacco. She reports that she does not drink alcohol or use drugs.  Post Discharge Medication Reconciliation Status: discharge medications reconciled and changed, per note/orders  Current Outpatient Medications   Medication Sig Dispense Refill     acetaminophen (TYLENOL) 500 MG tablet Take 500 mg by mouth 3 times daily        bisacodyl (DULCOLAX) 10 MG suppository Place 10 mg rectally daily as needed for constipation       bisacodyl (DULCOLAX) 5 MG EC tablet Take 5 mg by mouth daily (and a second time if needed)       busPIRone (BUSPAR) 5 MG tablet Take 5 mg by mouth 2 times daily as needed for anxiety       calcium carbonate (TUMS) 500 MG chewable tablet Take 1 tablet (500 mg) by mouth every 2 hours as needed for heartburn 150 tablet 1     carbidopa-levodopa (SINEMET)  MG tablet TAKE 2 TABLETS BY MOUTH THREE TIMES DAILY 180 tablet 11     clindamycin (CLEOCIN) 300 MG capsule Take 600 mg by mouth as needed Give 1 hour prior to dental appoitments       clotrimazole (LOTRIMIN) 1 % external cream Apply topically 2 times daily as needed       diclofenac (VOLTAREN) 1 % topical gel Apply 4 g topically 4 times daily as needed for moderate pain (to back and neck) 100 g 11     DULoxetine (CYMBALTA) 20 MG capsule Take 60 mg by mouth daily       gabapentin (NEURONTIN) 100 MG capsule Take 300 mg by mouth 3 times daily        GAS RELIEF 125 MG CAPS TAKE 1 CAPSULE BY MOUTH FOUR TIMES DAILY AS NEEDED WITH MEALS 30 capsule 11     hydrochlorothiazide (HYDRODIURIL) 25 MG tablet TAKE 1 TABLET BY MOUTH EVERY DAY DX HYPERTENSION AND LE EDEMA 31 tablet 11     levothyroxine (SYNTHROID/LEVOTHROID) 100 MCG tablet Take 1 tablet (100 mcg) by mouth daily       losartan (COZAAR) 50 MG tablet TAKE TABLET BY MOUTH EVERY MORNING 30 tablet 11     magnesium citrate solution Take 296 mLs by  mouth as needed for other       oxyCODONE (ROXICODONE) 5 MG tablet Take 1 tablet (5 mg) by mouth every 4 hours as needed 20 tablet 0     pantoprazole (PROTONIX) 20 MG EC tablet Take 20 mg by mouth daily       Polyethylene Glycol 3350 (MIRALAX PO) Take 1 capful by mouth daily 17 GM       polyethylene glycol-propylene glycol (SYSTANE ULTRA) 0.4-0.3 % SOLN ophthalmic solution Place 2 drops into both eyes 3 times daily (and additionally as needed/requested)       QUEtiapine (SEROQUEL) 25 MG tablet Take 0.5 tablets (12.5 mg) by mouth 2 times daily as needed (agitation, delusions)       QUEtiapine (SEROQUEL) 50 MG tablet Take 0.5 tablets (25 mg) by mouth At Bedtime       VITAMIN D3 25 MCG (1000 UT) tablet TAKE 2 TABLETS (2000IU) BY MOUTH EVERY DAY DX OSTEOPOROSIS 60 tablet 11     Zinc Oxide 13 % CREA Apply topically daily        ROS: Limited secondary to cognitive impairment but today pt reports the above and 10 point ROS of systems including Constitutional, Eyes, Respiratory, Cardiovascular, Gastroenterology, Genitourinary, Integumentary, Musculoskeletal, Psychiatric were all negative except for pertinent positives noted in my HPI.    Vitals:  BP (!) 89/57   Pulse 78   Temp 98.1  F (36.7  C)   Resp 16   Wt 70.4 kg (155 lb 4.8 oz)   SpO2 98%   BMI 25.07 kg/m    Exam:  GENERAL APPEARANCE: Alert, in no distress, cooperative.   EYES/ENT: EOM normal on camera, hearing acuity [place].  RESP: Respiratory effort appears unlabored over video screen, no respiratory distress apparent on video, On RA.   CV: Edema appears 0+ BLE via video. Color appears pale.  ABDOMEN: Appears non-distended.  SKIN: Skin appears baseline w/ video inspection. No wounds/rashes noted.  NEURO: CN II-XII at patient's baseline, MARMOLEJO at baseline on video. Sensation baseline PPS.  PSYCH: Insight, judgement, and memory are impaired at baseline, affect and mood are calm/confused.    Lab/Diagnostic data:  CBC RESULTS:   Recent Labs   Lab Test 10/10/20  3597  08/06/20  0712   WBC 5.3 5.1   RBC 4.32 4.38   HGB 13.5 14.0   HCT 41.6 42.7   MCV 96 98   MCH 31.3 32.0   MCHC 32.5 32.8   RDW 11.4 11.4    190   Last Basic Metabolic Panel:  Recent Labs   Lab Test 10/10/20  0415 08/06/20  0712    139   POTASSIUM 3.5 3.4   CHLORIDE 104 103   YADIRA 8.4* 8.8   CO2 32 36*   BUN 15 13   CR 0.55 0.51*   * 107*   Liver Function Studies -   Recent Labs   Lab Test 01/27/20  1722 12/28/19  1408   PROTTOTAL 7.5 7.4   ALBUMIN 3.0* 3.6   BILITOTAL 0.4 0.7   ALKPHOS 73 64   AST 21 24   ALT 16 21       ASSESSMENT/PLAN:  Lewy body dementia without behavioral disturbance (H)  VAMSHI (generalized anxiety disorder)  Multiple system atrophy P (H)  Episode of recurrent major depressive disorder, unspecified depression episode severity (H)  Closed compression fracture of L4 lumbar vertebra, with routine healing, subsequent encounter  Fall, subsequent encounter  Primary osteoarthritis involving multiple joints  Cognitive impairment  Polypharmacy  Acute-on-chronic. Ongoing.     We appreciate the work done inpatient to educate family regarding dementia, and Seroquel is helping. We will continue the PRNs from hospitalization. Continuation of PRN order for non-antipsychotic psychotropic medication (Buspar), and psychotropic  Seroquel, is appropriate due to occasional insomnia, sporadic anxiety and delrium and is being reordered today.     Given these medication adjustments and formalizing dementia diagnosis w/ neurology, we will consult in-house psych to continue to support Mehreen.     We note that prior to hospitalization, Mehreen's inappropriate use of Norco precluded her from taking more Tylenol. Given she has been weaned, we will schedile TID Tylenol @ 1000mg.    We note that with anxiety better controlled, we may be able to reduce polypharmacy. Will start w/ PPI reduction. No hx of PUD/GIB, or esophagitis. We will also discontinue PRN Tums, as this is available via BEHZAD.     Provider  coordinated care with nursing, neurology, and [facility] pharmacist to optimize care plan around Mehreen's dementia treatment.   Follow-up w/in 1 week or as needed.    Orders:  1. Clarification. Buspar PRN x 14 days. Dx: VAMSHI.  2. Clarification Seroquel PRN x 14 days. Dx: paranoid delusions/atypical psychosis.  3. In-house psych x1. Dx: paranoia.   4. Change Tylenol to 1000mg PO TID. Dx: OA.   5. Decrease Protonix to 20mg M/W/F. Dx: GERD.   6. Discontinue PRN Tums.  FYI: Next visit, consider discontinue of PPI, mag citrate, Lotrimin, Zinc cream, Simethicone.    Total time spent with patient visit at the skilled nursing facility was 35 min including patient visit and review of past records. Greater than 50% of total time spent with counseling and coordinating care with neurology, nursing, and pharmacists as noted above.     Video-Visit Details  Type of service:  Video Visit  Video Start Time: 1335  Video End Time (time video stopped): 1343  Originating Location (pt. Location): [facility]  Distant Location (provider location):  [facility] GERIATRIC TRANSITIONAL CARE   Mode of Communication:  Video Conference.    Electronically signed by:  Dr. Lisa Parry, HAILEE CNP DNP

## 2020-10-14 ENCOUNTER — TELEPHONE (OUTPATIENT)
Dept: GERIATRICS | Facility: CLINIC | Age: 80
End: 2020-10-14

## 2020-10-15 NOTE — TELEPHONE ENCOUNTER
Nursing staff called with report that patient is very agitated, walking rapidly down the kyle, has refused to accept PRN Seroquel.  Upon review of hospital records, patient was manifesting similar behaviors, was given Haldol and subsequent discharged on Seroquel.  Current order Seroquel 25 mg nightly and 12.5 mg twice daily as needed.  She has been receiving oxycodone for back pain.  Hospital notes indicate a history of at least mild cognitive deficits.  Is not clear to what extent her current behaviors represented delirium from pain, recent hospitalization and or pain medications.    Plan  Increase PRN Seroquel to 25 mg twice daily and continue 25 mg at at bedtime.  Hopefully patient will accept medication.  Nursing staff to update daughter who perhaps can come in and reassure patient get her take medication.  If she has continued agitation and refuses medications, will need to be sent back to the hospital for further management.    Primary team to be updated by nursing staff in the morning.

## 2020-10-16 DIAGNOSIS — I10 ESSENTIAL HYPERTENSION: ICD-10-CM

## 2020-10-16 RX ORDER — LOSARTAN POTASSIUM 50 MG/1
TABLET ORAL
Qty: 31 TABLET | Refills: 11 | Status: SHIPPED | OUTPATIENT
Start: 2020-10-16 | End: 2022-02-27 | Stop reason: DRUGHIGH

## 2020-10-17 ENCOUNTER — TELEPHONE (OUTPATIENT)
Dept: GERIATRICS | Facility: CLINIC | Age: 80
End: 2020-10-17

## 2020-10-17 DIAGNOSIS — S32.040A COMPRESSION FRACTURE OF L4 VERTEBRA, INITIAL ENCOUNTER (H): ICD-10-CM

## 2020-10-17 RX ORDER — OXYCODONE HYDROCHLORIDE 5 MG/1
5 TABLET ORAL EVERY 4 HOURS PRN
Qty: 30 TABLET | Refills: 0 | Status: SHIPPED | OUTPATIENT
Start: 2020-10-17 | End: 2020-10-20

## 2020-10-19 DIAGNOSIS — S32.040A COMPRESSION FRACTURE OF L4 VERTEBRA, INITIAL ENCOUNTER (H): ICD-10-CM

## 2020-10-20 RX ORDER — OXYCODONE HYDROCHLORIDE 5 MG/1
5 TABLET ORAL EVERY 4 HOURS PRN
Qty: 30 TABLET | Refills: 0 | Status: SHIPPED | OUTPATIENT
Start: 2020-10-20 | End: 2020-11-03

## 2020-10-21 ENCOUNTER — OFFICE VISIT (OUTPATIENT)
Dept: NEUROLOGY | Facility: CLINIC | Age: 80
End: 2020-10-21
Payer: MEDICAID

## 2020-10-21 DIAGNOSIS — F33.1 MAJOR DEPRESSIVE DISORDER, RECURRENT EPISODE, MODERATE (H): ICD-10-CM

## 2020-10-21 DIAGNOSIS — F41.9 ANXIETY: ICD-10-CM

## 2020-10-21 DIAGNOSIS — F03.90 SENILE DEMENTIA, UNCOMPLICATED (H): Primary | ICD-10-CM

## 2020-10-21 DIAGNOSIS — G90.3 MULTIPLE SYSTEM ATROPHY (H): ICD-10-CM

## 2020-10-21 DIAGNOSIS — R41.844 FRONTAL LOBE AND EXECUTIVE FUNCTION DEFICIT: ICD-10-CM

## 2020-10-21 DIAGNOSIS — G23.8 MULTIPLE SYSTEM ATROPHY (H): ICD-10-CM

## 2020-10-21 NOTE — LETTER
10/21/2020       RE: Mehreen Camara  : 1940   MRN: [id number]      Dear Colleague,    Thank you for referring your patient, Mehreen Camara, to the [facility] at [facility]. Please see a copy of my visit note below.    Patient was seen for neuropsychological evaluation at the request of Lisa STEPHEN, for the purposes of diagnostic clarification and treatment planning.  2 hrs 51 min of test administration and scoring were provided by this writer, Jaymie Fletcher.  Please see Dr. Shukri Bullard's report for a full interpretation of the findings.      Name: Mehreen Camara  MR#: [id number]  YOB: 1940  Date of Exam: 10/21/2020      Neuropsychology Laboratory  [facility] - [address], [address]  [place] [address]  [phone]    NEUROPSYCHOLOGICAL EVALUATION    IDENTIFYING INFORMATION  Mehreen Camara is an 80 year old, right handed, retired , with 9 years of formal education. She was accompanied to the evaluation by her daughter, Karrie.     BACKGROUND INFORMATION / INTERVIEW FINDINGS    Records indicate that Ms. Camara was diagnosed with multiple system atrophy - parkinsonian in late . Her initial symptom was orthostatic hypotension. She later developed parkinsonism. She had multiple hospitalizations in 2018 associated with the orthostatic hypotension. She also had increased fatigue and weakness. An MRI of her brain on 2018 documented slight interval progression of mild diffuse parenchymal volume loss with ex vacuo ventricular dilatation compared to a scan in . The scan also noted some pontine T2 signal change. Autonomic studies documented severe autonomic dysfunction. She has had some falls. She was recently hospitalized from October 10 through 2020 due to a fall with resulting compression fracture of her L4 vertebra. During this  hospitalization, she was noted to be agitated. She had hallucinations. There was concern for mild cognitive impairment versus Alzheimer's disease. Her other medical history includes depression, adjustment disorder, insomnia, REM behavior disorder, rupture of Achilles tendon, laparoscopic cholecystectomy, abdominal aortic aneurysm, Harris s esophagus, bradycardia, cataracts, dyslipidemia, esophageal reflux, hypertension, hypothyroidism, and adrenal adenoma, among other diagnoses. Concerns have been expressed about her cognition, and a possible dementia syndrome. The current evaluation was requested by HAILEE Bowers DNP, in this context.    On interview, Ms. Camara and her daughter confirmed the above history. The patient reported that she first began experiencing episodes of feeling lightheaded about six years ago. Her daughter noted that she was having episodes of lightheadedness where her blood pressure would drop more than 100 points in 30 minutes. The patient stated that she talked in her sleep when she was a child. She also noted that when she was a child, she had episodes where she would see/dream that someone would come in and out of her closet. She estimated that she began sleepwalking about 20 years ago. She stated that more recently, she has been having vivid dreams. She reported that she had symptoms consistent with REM behavior disorder dating back to at least 2014. She indicated that she has been having hallucinations while awake for the last two or three months. She stated that she sees people. She also noted that she has a  weak bladder,  and needs to wake up often at night in order to use the restroom.    Regarding cognition, Ms. Camara initially stated that her thinking skills are pretty much the same as they have always been. Even when I presented a list of specific cognitive domains to her, she denied having identified changes. She did note that she has gotten a little bit scared  "with her hallucinations. The patient's daughter indicated that the patient has some difficulties with her vision, but noted that the patient has a torn cornea and floaters in her vision. The patient noted that she has double vision at times, particularly later in the day. Her daughter reported that the patient is a little bit more forgetful than she used to be. The patient reported that she has always been forgetful. The patient then went on to state that there are times when she loses her train of thought and also struggles with word finding. She indicated that these issues have been present for a couple of years, and have gotten a little worse. The patient's daughter noted that the patient's physical limitations have been more restricting  than her cognitive difficulties. The patient indicated that there are times when she struggles with her handwriting, which makes her nervous and upset. The patient's daughter stated that the patient has been more steve and irritable, which she attributed to the patient's physical changes and the limitations that have been placed on her due to the COVID-19 pandemic. She otherwise denied having identified changes in the patient's personality.    With respect to mental health, Ms. Camara reported that her mood is good now. She noted frustrations with the limitations but have been placed on her due to the COVID-19 pandemic. She stated that she has had multiple recent hospitalizations, which have also been frustrating. She stated that she has been depressed all of her life, but did not let the depression symptoms hold her back. She stated that she was able to \"snap [her]self out of it  when she needed to. She has taken an antidepressant for a number of years. She saw psychiatrist in the past. She also saw therapists in the past, but is not currently seeing a therapist. Her daughter reported that the patient has been more anxious recently. The patient denied having ever had a " psychiatric hospitalization. As noted above, she has had hallucinations. The patient reported that she has had past fleeting thoughts of suicide, but denied current suicidal ideation or intent.     With regard to other medical background, Ms. Camara reported that she suffered a concussion in 1978 when she fell on ice and struck her head. She lost consciousness for some number of minutes. She stated that she had headaches afterwards, and also damaged a vertebra. She stated that she may have had a stroke once. She denied prior seizure. As noted above, the patient reported that she has symptoms of REM behavior disorder. She stated that she struggles with her sleep, and has had a particularly bad sleep in the last couple of nights. She indicated that she slept only a couple of hours the night before the exam. She did note that she always feels much better when she has slept well. She indicated that she has pain, and rated her pain at 5/10 at the time of the interview. She stated that her whole body hurts. Per records, her current medications include acetaminophen, Bisacodyl, buspirone, carbidopa-levodopa, clindamycin, clotrimazole, diclofenac, duloxetine, gabapentin, gas relief caps, hydrochlorothiazide, levothyroxine, losartan, magnesium citrate solution, oxycodone, pantoprazole, polyethylene glycol, quetiapine, vitamin D3, and zinc oxide cream. She denied alcohol, tobacco, or illicit drug use. She denied past substance abuse.    With respect to family neurologic history, she stated that an uncle had Parkinson's disease, and her father had dementia.    Ms. Camara has been living in an assisted living facility since 2017. She is currently staying in a transitional care unit since her hospital discharge earlier this month. She stated that she received some help with dressing due to pain in her shoulders. The assisted living facility is managing her medications, and her meal preparation. She manages her own  finances. She stopped driving in 2017. Her son is her power of .    By way of background, the patient was  twice. Her second  passed away in 2014. She has five children, all of whom live nearby. Regarding educational background, she stated that she finished the ninth grade. She indicated that she was never held back, nor did she have special education services. She stated that she was a good student, and was especially talented in her art classes. 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    BEHAVIORAL OBSERVATIONS  Ms. Camara was polite and cooperative with the exam. She insisted on taking a pain medication immediately prior to testing. She used a walker for ambulating. She had difficulty rising to her feet when she was sitting down. Tremors were not noted. She was mildly fidgety. She engaged in limited spontaneous conversation. Her speech was notable for mild to moderate difficulties with word finding, mild dysfluency, and occasional paraphasias on one measure. Her comprehension was mildly impaired. Her thought processes were notable for forgetting, moderate slowing, and fatigue. She nodded off at one point toward the end of testing. Her mood was mildly depressed and anxious with congruent affect. Her effort was good. The current results are felt to be a broadly accurate reflection of her cognitive functioning.       RESULTS OF EXAM  Her performances on standardized measures of neuropsychological functioning were as follows:    She was disoriented to place and was unable to state her address. She was otherwise oriented to various aspects of personal information. She was oriented to time. Performance on a measure of single word reading was average. Auditory attention for digits was impaired. Mental calculations were low average. Learning of words in a list format was impaired. Delay  "recall of list words was impaired, as she was unable to recollect any of the list words. Percent retention of list words was impaired. Delayed recognition of the list words was impaired, and notable for a \"yes\" response bias. Learning and delayed recall of story information were both impaired. She was noted to be nodding off during presentation of the first story. Delayed recognition of story information, however, was performed in the average range, compared to lenient age-corrected norms. She produced confabulations during this test. Learning of simple geometric shapes and their spatial locations was severely impaired. Delayed recall of the shapes and their locations was impaired, how she was unable recollect any of the shapes. Percent retention of the shapes was impaired. Delayed recognition of the shapes, however, was borderline impaired. Visual-spatial judgments for variably oriented lines were average. Visual problem-solving with blocks was average. Her drawing of a complicated geometric figure was impaired, and was notable for poor planning, perseverations, and drawing and redrawing of multiple elements of the figure. Comprehension of phrases and short stories was impaired. Verbal associative fluency was borderline impaired. Semantic verbal fluency was low average. Naming to confrontation was low average. Verbal abstract reasoning was impaired. Sentence writing was mildly impaired in the copy condition, with lack of punctuation, lack of capitalization, and omission of some letters. Sentence writing in the dictation condition was moderately impaired, and notable for lack of capitalization, punctuation, and numerous spelling errors. The sentence  The successful businessman likes to go boating on his yacht,  was written  [facility] ta go boading on His [place].  Spontaneous sentence writing was mildly impaired, with lack of capitalization, punctuation, omission of some letters. Speeded visual " sequencing under focused attention was average. A similar measure with a divided attention component was impaired, and discontinued at the test s time limit with three errors. Speeded word reading was low average. Speeded color naming was impaired. Speeded inhibition of an over-learned response was impaired. Speeded matching and cancellation was low average. Speeded fine motor dexterity was impaired, bilaterally.    She endorsed items consistent with moderate symptoms of depression, and mild symptoms of anxiety on self-report measures.    IMPRESSIONS  Dementia. Ms. Camara demonstrated a pattern of deficits and weaknesses that is consistent with multifocal brain dysfunction, but primarily so for the frontal and temporal regions. 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 In the current evaluation, pronounced deficits were noted in executive functioning, attention, learning, and recall memory. Milder weaknesses were noted in cognitive and psychomotor speed. Relative preservation, in keeping with her low end of the average range cognitive baseline, was noted in single word reading, one measure of visual problem-solving, naming, basic visual-spatial judgments, semantic verbal fluency, and on one measure of verbal recognition memory. She is also reporting moderate symptoms of depression, and mild symptoms of anxiety. It may be the case that psychological factors are contributing to some degree of variability in her thinking, although I do not think we can attribute the entirety of her cognitive dysfunction to psychological factors. Additionally, factors such as pain, pain medications, and poor sleep may also be contributing to some variability, but are also not entirely responsible for her cognitive weaknesses and deficits.     RECOMMENDATIONS  Preliminary results and recommendations were provided to the patient and her daughter on the  date of the evaluation, and all questions were answered.     1. In spite of treatment, she remains depressed and anxious. If medically indicated, consideration could be given to modified treatment of her mental health.    2. She will continue to require support and supervision her daily activities. I suspect that her support needs will increase in the future. Oversight of her finances is recommended.     3. If she continues to have difficulties with her memory, routine use of a memory notebook or other assistive device could be of benefit. Her memory also benefits from being provided with information in a context or framework.     4. She should continue to refrain from driving.    5. Follow-up neuropsychological evaluation could be considered in one year, if clinically indicated. The current results can be used as a baseline at that time.     Shukri Bullard, Ph.D., L.P., ABPP-CN   / Licensed Psychologist [id number]  Department of Rehabilitation Medicine  Division of Adult Neuropsychology  [facility]    Time spent: One unit (70 minutes) neurobehavioral status exam including interview, clinical assessment of thinking, reasoning, and judgment by licensed and board-certified neuropsychologist (CPT 96212). One unit (60 minutes) neuropsychological testing evaluation by licensed and board-certified neuropsychologist, including integration of patient data, interpretation of standardized test results and clinical data, clinical decision-making, treatment planning, report, and interactive feedback to the patient, first hour (CPT 25707). Two units (120 minutes) of neuropsychological testing evaluation by licensed and board-certified neuropsychologist, including integration of patient data, interpretation of standardized test results and clinical data, clinical decision-making, consulting with colleagues, treatment planning, report, and interactive feedback to the patient, subsequent hours (CPT  31895). One unit (30 minutes) of psychological and neuropsychological test administration and scoring by technician, first 30 minutes (CPT 05377). Five units (141 minutes) psychological or neuropsychological test administration and scoring by technician, subsequent 30 minutes (CPT 84221). Diagnoses: F03.90, G90.3, R41.844, F33.1, F41.9.

## 2020-10-21 NOTE — PROGRESS NOTES
Patient was seen for neuropsychological evaluation at the request of Lisa STEPHEN, for the purposes of diagnostic clarification and treatment planning.  2 hrs 51 min of test administration and scoring were provided by this writer, Jaymie Fletcher.  Please see Dr. Shukri Bullard's report for a full interpretation of the findings.

## 2020-10-22 ENCOUNTER — COMMUNICATION - HEALTHEAST (OUTPATIENT)
Dept: CARDIOLOGY | Facility: CLINIC | Age: 80
End: 2020-10-22

## 2020-10-22 NOTE — PROGRESS NOTES
Name: Mehreen Camara  MR#: [id number]  YOB: 1940  Date of Exam: 10/21/2020      Neuropsychology Laboratory  [facility]  [address], [address]  [place] [address]  [phone]    NEUROPSYCHOLOGICAL EVALUATION    IDENTIFYING INFORMATION  Mehreen Camara is an 80 year old, right handed, retired , with 9 years of formal education. She was accompanied to the evaluation by her daughter, Karrie.     BACKGROUND INFORMATION / INTERVIEW FINDINGS    Records indicate that Ms. Camara was diagnosed with multiple system atrophy - parkinsonian in late 2018. Her initial symptom was orthostatic hypotension. She later developed parkinsonism. She had multiple hospitalizations in 2018 associated with the orthostatic hypotension. She also had increased fatigue and weakness. An MRI of her brain on July 5, 2018 documented slight interval progression of mild diffuse parenchymal volume loss with ex vacuo ventricular dilatation compared to a scan in November, 2015. The scan also noted some pontine T2 signal change. Autonomic studies documented severe autonomic dysfunction. She has had some falls. She was recently hospitalized from October 10 through October 12, 2020 due to a fall with resulting compression fracture of her L4 vertebra. During this hospitalization, she was noted to be agitated. She had hallucinations. There was concern for mild cognitive impairment versus Alzheimer's disease. Her other medical history includes depression, adjustment disorder, insomnia, REM behavior disorder, rupture of Achilles tendon, laparoscopic cholecystectomy, abdominal aortic aneurysm, Harris s esophagus, bradycardia, cataracts, dyslipidemia, esophageal reflux, hypertension, hypothyroidism, and adrenal adenoma, among other diagnoses. Concerns have been expressed about her cognition, and a possible dementia syndrome. The current evaluation was requested by Lisa  HAILEE Parry DNP, in this context.    On interview, Ms. Camara and her daughter confirmed the above history. The patient reported that she first began experiencing episodes of feeling lightheaded about six years ago. Her daughter noted that she was having episodes of lightheadedness where her blood pressure would drop more than 100 points in 30 minutes. The patient stated that she talked in her sleep when she was a child. She also noted that when she was a child, she had episodes where she would see/dream that someone would come in and out of her closet. She estimated that she began sleepwalking about 20 years ago. She stated that more recently, she has been having vivid dreams. She reported that she had symptoms consistent with REM behavior disorder dating back to at least 2014. She indicated that she has been having hallucinations while awake for the last two or three months. She stated that she sees people. She also noted that she has a  weak bladder,  and needs to wake up often at night in order to use the restroom.    Regarding cognition, Ms. Camara initially stated that her thinking skills are pretty much the same as they have always been. Even when I presented a list of specific cognitive domains to her, she denied having identified changes. She did note that she has gotten a little bit scared with her hallucinations. The patient's daughter indicated that the patient has some difficulties with her vision, but noted that the patient has a torn cornea and floaters in her vision. The patient noted that she has double vision at times, particularly later in the day. Her daughter reported that the patient is a little bit more forgetful than she used to be. The patient reported that she has always been forgetful. The patient then went on to state that there are times when she loses her train of thought and also struggles with word finding. She indicated that these issues have been present for a couple of  "years, and have gotten a little worse. The patient's daughter noted that the patient's physical limitations have been more restricting  than her cognitive difficulties. The patient indicated that there are times when she struggles with her handwriting, which makes her nervous and upset. The patient's daughter stated that the patient has been more steve and irritable, which she attributed to the patient's physical changes and the limitations that have been placed on her due to the COVID-19 pandemic. She otherwise denied having identified changes in the patient's personality.    With respect to mental health, Ms. Camara reported that her mood is good now. She noted frustrations with the limitations but have been placed on her due to the COVID-19 pandemic. She stated that she has had multiple recent hospitalizations, which have also been frustrating. She stated that she has been depressed all of her life, but did not let the depression symptoms hold her back. She stated that she was able to \"snap [her]self out of it  when she needed to. She has taken an antidepressant for a number of years. She saw psychiatrist in the past. She also saw therapists in the past, but is not currently seeing a therapist. Her daughter reported that the patient has been more anxious recently. The patient denied having ever had a psychiatric hospitalization. As noted above, she has had hallucinations. The patient reported that she has had past fleeting thoughts of suicide, but denied current suicidal ideation or intent.     With regard to other medical background, Ms. Camara reported that she suffered a concussion in 1978 when she fell on ice and struck her head. She lost consciousness for some number of minutes. She stated that she had headaches afterwards, and also damaged a vertebra. She stated that she may have had a stroke once. She denied prior seizure. As noted above, the patient reported that she has symptoms of REM behavior " disorder. She stated that she struggles with her sleep, and has had a particularly bad sleep in the last couple of nights. She indicated that she slept only a couple of hours the night before the exam. She did note that she always feels much better when she has slept well. She indicated that she has pain, and rated her pain at 5/10 at the time of the interview. She stated that her whole body hurts. Per records, her current medications include acetaminophen, Bisacodyl, buspirone, carbidopa-levodopa, clindamycin, clotrimazole, diclofenac, duloxetine, gabapentin, gas relief caps, hydrochlorothiazide, levothyroxine, losartan, magnesium citrate solution, oxycodone, pantoprazole, polyethylene glycol, quetiapine, vitamin D3, and zinc oxide cream. She denied alcohol, tobacco, or illicit drug use. She denied past substance abuse.    With respect to family neurologic history, she stated that an uncle had Parkinson's disease, and her father had dementia.    Ms. Camara has been living in an assisted living facility since 2017. She is currently staying in a transitional care unit since her hospital discharge earlier this month. She stated that she received some help with dressing due to pain in her shoulders. The assisted living facility is managing her medications, and her meal preparation. She manages her own finances. She stopped driving in 2017. Her son is her power of .    By way of background, the patient was  twice. Her second  passed away in 2014. She has five children, all of whom live nearby. Regarding educational background, she stated that she finished the ninth grade. She indicated that she was never held back, nor did she have special education services. She stated that she was a good student, and was especially talented in her art classes. 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 She is  "retired.    BEHAVIORAL OBSERVATIONS  Ms. Camara was polite and cooperative with the exam. She insisted on taking a pain medication immediately prior to testing. She used a walker for ambulating. She had difficulty rising to her feet when she was sitting down. Tremors were not noted. She was mildly fidgety. She engaged in limited spontaneous conversation. Her speech was notable for mild to moderate difficulties with word finding, mild dysfluency, and occasional paraphasias on one measure. Her comprehension was mildly impaired. Her thought processes were notable for forgetting, moderate slowing, and fatigue. She nodded off at one point toward the end of testing. Her mood was mildly depressed and anxious with congruent affect. Her effort was good. The current results are felt to be a broadly accurate reflection of her cognitive functioning.       RESULTS OF EXAM  Her performances on standardized measures of neuropsychological functioning were as follows:    She was disoriented to place and was unable to state her address. She was otherwise oriented to various aspects of personal information. She was oriented to time. Performance on a measure of single word reading was average. Auditory attention for digits was impaired. Mental calculations were low average. Learning of words in a list format was impaired. Delay recall of list words was impaired, as she was unable to recollect any of the list words. Percent retention of list words was impaired. Delayed recognition of the list words was impaired, and notable for a \"yes\" response bias. Learning and delayed recall of story information were both impaired. She was noted to be nodding off during presentation of the first story. Delayed recognition of story information, however, was performed in the average range, compared to lenient age-corrected norms. She produced confabulations during this test. Learning of simple geometric shapes and their spatial locations was " severely impaired. Delayed recall of the shapes and their locations was impaired, how she was unable recollect any of the shapes. Percent retention of the shapes was impaired. Delayed recognition of the shapes, however, was borderline impaired. Visual-spatial judgments for variably oriented lines were average. Visual problem-solving with blocks was average. Her drawing of a complicated geometric figure was impaired, and was notable for poor planning, perseverations, and drawing and redrawing of multiple elements of the figure. Comprehension of phrases and short stories was impaired. Verbal associative fluency was borderline impaired. Semantic verbal fluency was low average. Naming to confrontation was low average. Verbal abstract reasoning was impaired. Sentence writing was mildly impaired in the copy condition, with lack of punctuation, lack of capitalization, and omission of some letters. Sentence writing in the dictation condition was moderately impaired, and notable for lack of capitalization, punctuation, and numerous spelling errors. The sentence  The successful businessman likes to go boating on his yacht,  was written  Sussesfor bussy man [place] ta go boading on His [place].  Spontaneous sentence writing was mildly impaired, with lack of capitalization, punctuation, omission of some letters. Speeded visual sequencing under focused attention was average. A similar measure with a divided attention component was impaired, and discontinued at the test s time limit with three errors. Speeded word reading was low average. Speeded color naming was impaired. Speeded inhibition of an over-learned response was impaired. Speeded matching and cancellation was low average. Speeded fine motor dexterity was impaired, bilaterally.    She endorsed items consistent with moderate symptoms of depression, and mild symptoms of anxiety on self-report measures.    IMPRESSIONS  Dementia. Ms. Camara demonstrated a pattern of  deficits and weaknesses that is consistent with multifocal brain dysfunction, but primarily so for the frontal and temporal regions. 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 In the current evaluation, pronounced deficits were noted in executive functioning, attention, learning, and recall memory. Milder weaknesses were noted in cognitive and psychomotor speed. Relative preservation, in keeping with her low end of the average range cognitive baseline, was noted in single word reading, one measure of visual problem-solving, naming, basic visual-spatial judgments, semantic verbal fluency, and on one measure of verbal recognition memory. She is also reporting moderate symptoms of depression, and mild symptoms of anxiety. It may be the case that psychological factors are contributing to some degree of variability in her thinking, although I do not think we can attribute the entirety of her cognitive dysfunction to psychological factors. Additionally, factors such as pain, pain medications, and poor sleep may also be contributing to some variability, but are also not entirely responsible for her cognitive weaknesses and deficits.     RECOMMENDATIONS  Preliminary results and recommendations were provided to the patient and her daughter on the date of the evaluation, and all questions were answered.     1. In spite of treatment, she remains depressed and anxious. If medically indicated, consideration could be given to modified treatment of her mental health.    2. She will continue to require support and supervision her daily activities. I suspect that her support needs will increase in the future. Oversight of her finances is recommended.     3. If she continues to have difficulties with her memory, routine use of a memory notebook or other assistive device could be of benefit. Her memory also benefits from being provided with information in  a context or framework.     4. She should continue to refrain from driving.    5. Follow-up neuropsychological evaluation could be considered in one year, if clinically indicated. The current results can be used as a baseline at that time.     Shukri Bullard, Ph.D., L.P., ABPP-CN   / Licensed Psychologist [id number]  Department of Rehabilitation Medicine  Division of Adult Neuropsychology  [facility]    Time spent: One unit (70 minutes) neurobehavioral status exam including interview, clinical assessment of thinking, reasoning, and judgment by licensed and board-certified neuropsychologist (CPT 81775). One unit (60 minutes) neuropsychological testing evaluation by licensed and board-certified neuropsychologist, including integration of patient data, interpretation of standardized test results and clinical data, clinical decision-making, treatment planning, report, and interactive feedback to the patient, first hour (CPT 07115). Two units (120 minutes) of neuropsychological testing evaluation by licensed and board-certified neuropsychologist, including integration of patient data, interpretation of standardized test results and clinical data, clinical decision-making, consulting with colleagues, treatment planning, report, and interactive feedback to the patient, subsequent hours (CPT 45254). One unit (30 minutes) of psychological and neuropsychological test administration and scoring by technician, first 30 minutes (CPT 19416). Five units (141 minutes) psychological or neuropsychological test administration and scoring by technician, subsequent 30 minutes (CPT 74057). Diagnoses: F03.90, G90.3, R41.844, F33.1, F41.9.

## 2020-10-22 NOTE — PROGRESS NOTES
Name: Mehreen Camara MRN: [id number]  : 1940 DAVIS: 10/21/2020  Staff: SALINA Tech: ALLEY Age: 80  Sex: Female Hand: Right Educ: 9-11  Vision: 20/40 ?with correction / ?without correction    ORIENTATION     Time  -1     Place  0 /     Personal info     3 /4    WAIS-IV     Raw SSa     Similarities  4 2     Block Design  20 8     Digit Span  8 2      Arithmetic  7 6     Symbol Search  9 6    ALIDA-O    Raw  T %ile     Copy    20.0     ?1     Time to Copy  431        WRAT4   SS %ile Grade Equiv.     Word Reading  94 34 8.9    COWAT (FAS)   Raw: 17   T: 32  Animals   Raw:  11  T-score:  37    BOSTON NAMING TEST   Raw: 38   %ile 20-24    COMPLEX IDEATIONAL MATERIAL   Raw: 8 T: 18    TRAILS  Raw  Err %ile    A 151  1 57-71   B   3 14    STROOP Raw %ile   Word 60 14   Color  37 <14       Color/Word  3 <14    JOCELYN-Short   Raw: 8/15 %ile: 29-36    GROOVED PEGBOARD    Raw  T Drops   RH  179  29 0     29 1    CECILY  Raw:  14  Interpretation: Mild    GDS   Raw:  18  Interpretation: Moderate    WRITING SAMPLES   Copy: Borderline   Dictation: Impaired   Spontaneous: Borderline    WMS-III  Raw SS / Z     LM I  2 2     LM II  0 4     LM Recog. 19 Z: -0.65    HVLT-R     Trial 1 2 3 Total      0 3 2  5  Raw T     Total Learning (1-3) 5 ?20     Delayed Recall  0 23     Percent Retention 0 ?20     Recognition Hits/FP /11 ?20    BVMT-R     Trial 1 2 3 Total      1 0 0  1  Raw T / %ile     Total Learning (1-3) 1 20     Delayed Recall  0 <1     Percent Retention 0 <1st     Recognition Hits/FP 4/2 <1st

## 2020-10-23 ENCOUNTER — VIRTUAL VISIT (OUTPATIENT)
Dept: GERIATRICS | Facility: CLINIC | Age: 80
End: 2020-10-23
Payer: COMMERCIAL

## 2020-10-23 ENCOUNTER — OFFICE VISIT - HEALTHEAST (OUTPATIENT)
Dept: CARDIOLOGY | Facility: CLINIC | Age: 80
End: 2020-10-23

## 2020-10-23 VITALS
DIASTOLIC BLOOD PRESSURE: 66 MMHG | OXYGEN SATURATION: 98 % | RESPIRATION RATE: 16 BRPM | TEMPERATURE: 97.7 F | HEART RATE: 59 BPM | BODY MASS INDEX: 24.99 KG/M2 | SYSTOLIC BLOOD PRESSURE: 106 MMHG | WEIGHT: 154.8 LBS

## 2020-10-23 DIAGNOSIS — F02.80 LEWY BODY DEMENTIA WITHOUT BEHAVIORAL DISTURBANCE (H): ICD-10-CM

## 2020-10-23 DIAGNOSIS — I10 ESSENTIAL HYPERTENSION: ICD-10-CM

## 2020-10-23 DIAGNOSIS — F43.22 ADJUSTMENT DISORDER WITH ANXIOUS MOOD: ICD-10-CM

## 2020-10-23 DIAGNOSIS — G23.2 MULTIPLE SYSTEM ATROPHY P (H): ICD-10-CM

## 2020-10-23 DIAGNOSIS — I35.0 NONRHEUMATIC AORTIC VALVE STENOSIS: ICD-10-CM

## 2020-10-23 DIAGNOSIS — Z79.899 POLYPHARMACY: ICD-10-CM
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
## 2020-10-23 DIAGNOSIS — F41.1 GAD (GENERALIZED ANXIETY DISORDER): ICD-10-CM

## 2020-10-23 DIAGNOSIS — S32.040A COMPRESSION FRACTURE OF L4 VERTEBRA, INITIAL ENCOUNTER (H): Primary | ICD-10-CM

## 2020-10-23 DIAGNOSIS — F33.9 EPISODE OF RECURRENT MAJOR DEPRESSIVE DISORDER, UNSPECIFIED DEPRESSION EPISODE SEVERITY (H): ICD-10-CM

## 2020-10-23 DIAGNOSIS — M15.0 PRIMARY OSTEOARTHRITIS INVOLVING MULTIPLE JOINTS: ICD-10-CM

## 2020-10-23 DIAGNOSIS — G31.83 LEWY BODY DEMENTIA WITHOUT BEHAVIORAL DISTURBANCE (H): ICD-10-CM

## 2020-10-23 PROCEDURE — 99316 NF DSCHRG MGMT 30 MIN+: CPT | Mod: 95 | Performed by: NURSE PRACTITIONER

## 2020-10-23 RX ORDER — QUETIAPINE FUMARATE 25 MG/1
25 TABLET, FILM COATED ORAL DAILY PRN
Start: 2020-10-23 | End: 2020-11-09 | Stop reason: SINTOL

## 2020-10-23 ASSESSMENT — MIFFLIN-ST. JEOR: SCORE: 1184.83

## 2020-10-23 NOTE — LETTER
"    10/23/2020        RE: Mehreen Camara  [facility]  [address]  [facility] [address]        [facility] Geriatrics Video Visit  Mehreen Camara is a 80 year old female who is being evaluated via a billable video visit due to the restrictions of the COVID19 pandemic.The patient has been notified of following: \"This video visit will be conducted via a call between you and your provider. We have found that certain health care needs can be provided without the need for an in-person physical exam.  This service lets us provide the care you need with a video conversation.  If a prescription is necessary we can send it directly to your pharmacy.  If lab work is needed we can place an order for that and you can then stop by our lab to have the test done at a later time. If during the course of the call the provider feels a video visit is not appropriate, you will not be charged for this service.\"     Proivder has received verbal consent for a Video Visit from the patient? Yes    Patient/facility would like the video invitation sent by: Send to e-mail at: [email or web address]    This visit took place virtually, with the patient located at [facility].  ________________________________________________  Video Start Time: 1317  TCU DISCHARGE SUMMARY  PATIENT'S NAME: Mehreen Camara. : 1940. MRN: [id number]. PRIMARY CARE PROVIDER AND CLINIC RESPONSIBLE AFTER TRANSFER:   HAILEE Langley CNP DNP, [address] [facility] [address] / [place] [address]. [facility] Provider     Transferring providers: HAILEE Gilbert CNP, DNP  Recent Hospitalization/ED:  [facility] stay 10/10 to 10/12.  Date of TCU Admission: October / 10 / 2020  Date of TCU (anticipated) Discharge:   Discharged to: previous assisted living  Cognitive Scores: BIMS: 15/15, SLUMS: 14/30 and CPT: 3.6/5.6  Physical Function: Tinetti Balance Assessment: " 19/28.  DME: None.  CODE STATUS/ADVANCE DIRECTIVES DISCUSSION: DNR/DNI.  ALLERGIES: Penicillins, Aspirin, Atenolol, Atorvastatin, Bupropion, Clindamycin, Codeine, Ibuprofen, Lovastatin, and Cephalexin    DISCHARGE DIAGNOSIS/NURSING FACILITY COURSE:   Compression fracture of L4 vertebra, initial encounter (H)  Multiple system atrophy P (H)  Lewy body dementia without behavioral disturbance (H)  Episode of recurrent major depressive disorder, unspecified depression episode severity (H)  VAMSHI (generalized anxiety disorder)  Fall, subsequent encounter  Essential hypertension  Primary osteoarthritis involving multiple joints  Polypharmacy  FVL on 10/10 after a mechanical fall in [place]. She was found to have an acute L4 compression fracture in the presence of scoliosis. She was treated conservatively w/ TLSO. While inpatient, she was her usual demanding, non-directable, anxious, self. She was throwing things, yelling at staff, and skinny-psych was considered. Seroquel was started. She was weaned from norco and given Oxycodone for acute pain relief. She will follow-up w/ ortho in 2-3 weeks. Mehreen presented to TCU on 10/12 and we had to titrate upward on Seroquel secondary to worsened behaviors. While in TCU, she follow-up up w/ neuro-psych who performed extensive testing and diagnosed her with Dementia w/ daughter present for education. Neurologist was updated on this as well. Mehreen also followed up with cardiology during her stay.  She is much more comfortable and in much less stress with the med changes.     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  Discharge Medications:  Current Outpatient Medications   Medication Sig Dispense Refill     QUEtiapine (SEROQUEL) 25 MG tablet Take 1 tablet (25 mg) by mouth daily as needed (agitation, delusions)        acetaminophen (TYLENOL) 500 MG tablet Take 1,000 mg by mouth 3 times daily       bisacodyl (DULCOLAX) 10 MG suppository Place 10 mg rectally daily as needed for constipation       bisacodyl (DULCOLAX) 5 MG EC tablet Take 5 mg by mouth daily (and a second time if needed)       busPIRone (BUSPAR) 5 MG tablet Take 5 mg by mouth 2 times daily as needed for anxiety       carbidopa-levodopa (SINEMET)  MG tablet TAKE 2 TABLETS BY MOUTH THREE TIMES DAILY 180 tablet 11     clotrimazole (LOTRIMIN) 1 % external cream Apply topically 2 times daily as needed       diclofenac (VOLTAREN) 1 % topical gel Apply 4 g topically 4 times daily as needed for moderate pain (to back and neck) 100 g 11     DULoxetine (CYMBALTA) 20 MG capsule Take 60 mg by mouth daily       gabapentin (NEURONTIN) 100 MG capsule Take 300 mg by mouth 3 times daily        hydrochlorothiazide (HYDRODIURIL) 25 MG tablet TAKE 1 TABLET BY MOUTH EVERY DAY DX HYPERTENSION AND LE EDEMA 31 tablet 11     levothyroxine (SYNTHROID/LEVOTHROID) 100 MCG tablet Take 1 tablet (100 mcg) by mouth daily       losartan (COZAAR) 50 MG tablet TAKE 1 TABLET BY MOUTH EVERY MORNING 31 tablet 11     oxyCODONE (ROXICODONE) 5 MG tablet Take 1 tablet (5 mg) by mouth every 4 hours as needed for severe pain 30 tablet 0     Polyethylene Glycol 3350 (MIRALAX PO) Take 1 capful by mouth daily 17 GM       polyethylene glycol-propylene glycol (SYSTANE ULTRA) 0.4-0.3 % SOLN ophthalmic solution Place 2 drops into both eyes 3 times daily (and additionally as needed/requested)       QUEtiapine (SEROQUEL) 50 MG tablet Take 0.5 tablets (25 mg) by mouth At Bedtime       VITAMIN D3 25 MCG (1000 UT) tablet TAKE 2 TABLETS (2000IU) BY MOUTH EVERY DAY DX OSTEOPOROSIS 60 tablet 11     Zinc Oxide 13 % CREA Apply topically daily        Medication Changes/Rationale:     Seroquel upped.    Non-essential meds discontinued.     Controlled medications sent with patient:   Medication: Oxycodone , remaining tabs  given to patient at the time of discharge to take home     ROS: Limited secondary to cognitive impairment but today pt reports the above and 4 point ROS including Respiratory, CV, GI and , other than that noted in the HPI, is negative.    Physical Exam: Vitals: /66   Pulse 59   Temp 97.7  F (36.5  C)   Resp 16   Wt 70.2 kg (154 lb 12.8 oz)   SpO2 98%   BMI 24.99 kg/m    BMI= Body mass index is 24.99 kg/m .  GENERAL APPEARANCE: Alert, in no distress, cooperative.   EYES/ENT: EOM normal on camera, hearing acuity [place].  RESP: Respiratory effort appears unlabored over video screen, no respiratory distress apparent on video, On RA.   ABDOMEN: Appears non-distended, flat.  SKIN: Skin appears baseline w/ video inspection. No wounds/rashes noted.    NEURO: CN II-XII at patient's baseline, MARMOLEJO at baseline on video. Sensation baseline PPS.  PSYCH: Insight, judgement, and memory are baseline, affect and mood are happy/anxious.    TCU Labs: Review [facility] Care Everywhere or Results Review.    DISCHARGE PLAN:    Follow up labs: No labs orders/due.    Medical Follow Up: Follow up with PCP in 2-4 weeks.    MTM referral needed and placed by this provider: No    Current [place] scheduled appointments: None.    Discharge Services: Out Patient:  physical therapy and occupational therapy    Orders:  1. DC Protonix.  2. DC PRN Clinda.   3. DC Mag Citrate.   4. DC Simethicone.    TOTAL DISCHARGE TIME:   Greater than 30 minutes    Video-Visit Details  Type of service:  Video Visit  Video Start Time: 1317  Video End Time (time video stopped): 1321  Originating Location (pt. Location): TCU  Distant Location (provider location):  [facility] GERIATRIC TRANSITIONAL CARE   Mode of Communication:  Video Conference.    Electronically signed by:  HAILEE Gilbert CNP DNP            Sincerely,        HAILEE Langley CNP

## 2020-10-23 NOTE — PROGRESS NOTES
"[facility] Geriatrics Video Visit  Mehreen Camara is a 80 year old female who is being evaluated via a billable video visit due to the restrictions of the COVID19 pandemic.The patient has been notified of following: \"This video visit will be conducted via a call between you and your provider. We have found that certain health care needs can be provided without the need for an in-person physical exam.  This service lets us provide the care you need with a video conversation.  If a prescription is necessary we can send it directly to your pharmacy.  If lab work is needed we can place an order for that and you can then stop by our lab to have the test done at a later time. If during the course of the call the provider feels a video visit is not appropriate, you will not be charged for this service.\"     Proivder has received verbal consent for a Video Visit from the patient? Yes    Patient/facility would like the video invitation sent by: Send to e-mail at: [email or web address]    This visit took place virtually, with the patient located at [facility].  ________________________________________________  Video Start Time:   TCU DISCHARGE SUMMARY  PATIENT'S NAME: Mehreen Camara. : 1940. MRN: [id number]. PRIMARY CARE PROVIDER AND CLINIC RESPONSIBLE AFTER TRANSFER:   HAILEE Langley CNP DNP, [address] [place] [address] / [facility] [address]. [facility] Provider     Transferring providers: HAILEE Gilbert CNP, DNP  Recent Hospitalization/ED:  [facility] stay 10/10 to 10/12.  Date of TCU Admission: October / 10 / 2020  Date of TCU (anticipated) Discharge:   Discharged to: previous assisted living  Cognitive Scores: BIMS: 15/15, SLUMS: 14/30 and CPT: 3.6/5.6  Physical Function: Tinetti Balance Assessment: .  DME: None.  CODE STATUS/ADVANCE DIRECTIVES DISCUSSION: DNR/DNI.  ALLERGIES: Penicillins, Aspirin, Atenolol, " Atorvastatin, Bupropion, Clindamycin, Codeine, Ibuprofen, Lovastatin, and Cephalexin    DISCHARGE DIAGNOSIS/NURSING FACILITY COURSE:   Compression fracture of L4 vertebra, initial encounter (H)  Multiple system atrophy P (H)  Lewy body dementia without behavioral disturbance (H)  Episode of recurrent major depressive disorder, unspecified depression episode severity (H)  VAMSHI (generalized anxiety disorder)  Fall, subsequent encounter  Essential hypertension  Primary osteoarthritis involving multiple joints  Polypharmacy  FVL on 10/10 after a mechanical fall in [place]. She was found to have an acute L4 compression fracture in the presence of scoliosis. She was treated conservatively w/ TLSO. While inpatient, she was her usual demanding, non-directable, anxious, self. She was throwing things, yelling at staff, and skinny-psych was considered. Seroquel was started. She was weaned from norco and given Oxycodone for acute pain relief. She will follow-up w/ ortho in 2-3 weeks. Mehreen presented to TCU on 10/12 and we had to titrate upward on Seroquel secondary to worsened behaviors. While in TCU, she follow-up up w/ neuro-psych who performed extensive testing and diagnosed her with Dementia w/ daughter present for education. Neurologist was updated on this as well. Mehreen also followed up with cardiology during her stay.  She is much more comfortable and in much less stress with the med changes.     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  Discharge Medications:  Current Outpatient Medications   Medication Sig Dispense Refill     QUEtiapine (SEROQUEL) 25 MG tablet Take 1 tablet (25 mg) by mouth daily as needed (agitation, delusions)       acetaminophen (TYLENOL) 500 MG tablet Take 1,000 mg by mouth 3 times daily       bisacodyl (DULCOLAX) 10 MG suppository  Place 10 mg rectally daily as needed for constipation       bisacodyl (DULCOLAX) 5 MG EC tablet Take 5 mg by mouth daily (and a second time if needed)       busPIRone (BUSPAR) 5 MG tablet Take 5 mg by mouth 2 times daily as needed for anxiety       carbidopa-levodopa (SINEMET)  MG tablet TAKE 2 TABLETS BY MOUTH THREE TIMES DAILY 180 tablet 11     clotrimazole (LOTRIMIN) 1 % external cream Apply topically 2 times daily as needed       diclofenac (VOLTAREN) 1 % topical gel Apply 4 g topically 4 times daily as needed for moderate pain (to back and neck) 100 g 11     DULoxetine (CYMBALTA) 20 MG capsule Take 60 mg by mouth daily       gabapentin (NEURONTIN) 100 MG capsule Take 300 mg by mouth 3 times daily        hydrochlorothiazide (HYDRODIURIL) 25 MG tablet TAKE 1 TABLET BY MOUTH EVERY DAY DX HYPERTENSION AND LE EDEMA 31 tablet 11     levothyroxine (SYNTHROID/LEVOTHROID) 100 MCG tablet Take 1 tablet (100 mcg) by mouth daily       losartan (COZAAR) 50 MG tablet TAKE 1 TABLET BY MOUTH EVERY MORNING 31 tablet 11     oxyCODONE (ROXICODONE) 5 MG tablet Take 1 tablet (5 mg) by mouth every 4 hours as needed for severe pain 30 tablet 0     Polyethylene Glycol 3350 (MIRALAX PO) Take 1 capful by mouth daily 17 GM       polyethylene glycol-propylene glycol (SYSTANE ULTRA) 0.4-0.3 % SOLN ophthalmic solution Place 2 drops into both eyes 3 times daily (and additionally as needed/requested)       QUEtiapine (SEROQUEL) 50 MG tablet Take 0.5 tablets (25 mg) by mouth At Bedtime       VITAMIN D3 25 MCG (1000 UT) tablet TAKE 2 TABLETS (2000IU) BY MOUTH EVERY DAY DX OSTEOPOROSIS 60 tablet 11     Zinc Oxide 13 % CREA Apply topically daily        Medication Changes/Rationale:     Seroquel upped.    Non-essential meds discontinued.     Controlled medications sent with patient:   Medication: Oxycodone , remaining tabs given to patient at the time of discharge to take home     ROS: Limited secondary to cognitive impairment but today pt  reports the above and 4 point ROS including Respiratory, CV, GI and , other than that noted in the HPI, is negative.    Physical Exam: Vitals: /66   Pulse 59   Temp 97.7  F (36.5  C)   Resp 16   Wt 70.2 kg (154 lb 12.8 oz)   SpO2 98%   BMI 24.99 kg/m    BMI= Body mass index is 24.99 kg/m .  GENERAL APPEARANCE: Alert, in no distress, cooperative.   EYES/ENT: EOM normal on camera, hearing acuity [place].  RESP: Respiratory effort appears unlabored over video screen, no respiratory distress apparent on video, On RA.   ABDOMEN: Appears non-distended, flat.  SKIN: Skin appears baseline w/ video inspection. No wounds/rashes noted.    NEURO: CN II-XII at patient's baseline, MARMOLEJO at baseline on video. Sensation baseline PPS.  PSYCH: Insight, judgement, and memory are baseline, affect and mood are happy/anxious.    TCU Labs: Review [facility] Care Everywhere or Results Review.    DISCHARGE PLAN:    Follow up labs: No labs orders/due.    Medical Follow Up: Follow up with PCP in 2-4 weeks.    MTM referral needed and placed by this provider: No    Current [place] scheduled appointments: None.    Discharge Services: Out Patient:  physical therapy and occupational therapy    Orders:  1. DC Protonix.  2. DC PRN Clinda.   3. DC Mag Citrate.   4. DC Simethicone.    TOTAL DISCHARGE TIME:   Greater than 30 minutes    Video-Visit Details  Type of service:  Video Visit  Video Start Time: 1317  Video End Time (time video stopped): 1321  Originating Location (pt. Location): TCU  Distant Location (provider location):  [facility] GERIATRIC TRANSITIONAL CARE   Mode of Communication:  Video Conference.    Electronically signed by:  Dr. Lisa Parry, APRN CNP DNP

## 2020-10-26 ENCOUNTER — RECORDS - HEALTHEAST (OUTPATIENT)
Dept: ADMINISTRATIVE | Facility: OTHER | Age: 80
End: 2020-10-26

## 2020-10-29 ENCOUNTER — TELEPHONE (OUTPATIENT)
Dept: PHARMACY | Facility: CLINIC | Age: 80
End: 2020-10-29

## 2020-10-29 NOTE — TELEPHONE ENCOUNTER
Received voicemail from daughter, Eladio, stating that she has concerns about her mom's medications causing falls, especially Seroquel.  She is wondering if she can make an appointment with me for a medication review and to discuss further.     Arianne Melendrez, Pharm.D.  Medication Therapy Management Pharmacist  Phone: [phone]

## 2020-10-30 ENCOUNTER — VIRTUAL VISIT (OUTPATIENT)
Dept: GERIATRICS | Facility: CLINIC | Age: 80
End: 2020-10-30
Payer: COMMERCIAL

## 2020-10-30 ENCOUNTER — TELEPHONE (OUTPATIENT)
Dept: NEUROLOGY | Facility: CLINIC | Age: 80
End: 2020-10-30

## 2020-10-30 VITALS — HEIGHT: 66 IN | BODY MASS INDEX: 24.88 KG/M2 | WEIGHT: 154.8 LBS

## 2020-10-30 DIAGNOSIS — G23.2 MULTIPLE SYSTEM ATROPHY P (H): Primary | ICD-10-CM

## 2020-10-30 DIAGNOSIS — F41.1 GAD (GENERALIZED ANXIETY DISORDER): ICD-10-CM

## 2020-10-30 DIAGNOSIS — F33.9 EPISODE OF RECURRENT MAJOR DEPRESSIVE DISORDER, UNSPECIFIED DEPRESSION EPISODE SEVERITY (H): ICD-10-CM

## 2020-10-30 DIAGNOSIS — Z79.899 POLYPHARMACY: ICD-10-CM

## 2020-10-30 DIAGNOSIS — S32.040A COMPRESSION FRACTURE OF L4 VERTEBRA, INITIAL ENCOUNTER (H): ICD-10-CM
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
## 2020-10-30 DIAGNOSIS — I10 ESSENTIAL HYPERTENSION: ICD-10-CM

## 2020-10-30 DIAGNOSIS — F02.80 LEWY BODY DEMENTIA WITHOUT BEHAVIORAL DISTURBANCE (H): ICD-10-CM

## 2020-10-30 DIAGNOSIS — M15.0 PRIMARY OSTEOARTHRITIS INVOLVING MULTIPLE JOINTS: ICD-10-CM

## 2020-10-30 DIAGNOSIS — G31.83 LEWY BODY DEMENTIA WITHOUT BEHAVIORAL DISTURBANCE (H): ICD-10-CM

## 2020-10-30 ASSESSMENT — MIFFLIN-ST. JEOR: SCORE: 1188.92

## 2020-10-30 NOTE — TELEPHONE ENCOUNTER
[facility] Call Center    Phone Message    May a detailed message be left on voicemail: yes     Reason for Call: Other: Patients daughter Chasidy calling in regards to patient. Stating that pt was recently in the hospital and was given Seroquel at the hospital to help sleep for night terrors and states that she could take during the day as needed. States that patient is now at an assisted living facility and states she has been falling non stop. Chasidy read up on the Seroquel and noticed it can give balance problems and blood pressure problems. Chasidy thinks they are giving patient too much during the day. Chasidy states that sometimes the nursing home is hard headed and hard to reach but is hoping Dr. Monterroso can help and give advice.    Please advise and call Chasidy back at your earliest convenience         Action Taken: Other: [facility] NEUROLOGY     Travel Screening: Not Applicable

## 2020-10-30 NOTE — LETTER
"    10/30/2020        RE: Mehreen Camara  [facility]  [address]  [facility] [address]        Telephone visit  [place] GERIATRIC SERVICES  Mehreen Camara is a 80 year old year old adult who is being evaluated via a billable telephone visit due to the restrictions of the Covid-19 pandemic. The patient has been notified of following: \"This telephone visit will be conducted via a call between you and your provider. We have found that certain health care needs can be provided without the need for a physical exam. This service lets us provide the care you need with a short phone conversation. If a prescription is necessary we will work with the facility you are at and can send it directly to your pharmacy. If lab work is needed we can place an order to have it drawn at the facility you are at. If during the course of the call the provider feels a telephone visit is not appropriate, you will not be charged for this service.\"     Patient or Patient's first contact has given verbal consent for Telephone visit?  Yes    Place of Location at the time of visit: [facility].  Mehreen Camara complains of :   Chief Complaint   Patient presents with     Nursing Home Acute   I have reviewed and updated the patient's Past Medical History, Social History, Family History and Medication List.  ALLERGIES:   Allergies   Allergen Reactions     Penicillins Anaphylaxis, Rash and Shortness Of Breath     Aspirin Nausea and GI Disturbance     Atenolol Cough     Atorvastatin Muscle Pain (Myalgia) and Nausea and Vomiting     Bupropion Nausea     Clindamycin Other (See Comments)     Constipation      Codeine Nausea     Ibuprofen Nausea     Stomach upset     Lovastatin Muscle Pain (Myalgia)     Cephalexin Rash     Neck reddness      HPI: HPI information obtained from: facility chart records, facility staff, patient report, [facility] chart review and Care Everywhere [facility] chart review.  "     Mehreen reporting to family that she has fallen multiple times since returning home and sometimes even multiple times per night. She has also reported to family overall weakness, light-headedness, and low BPs. Nursing reports that Mehreen has one recorded fall on 10/27 since her return to her apartment from [facility], and 5 falls total in 2020. Her most recent BP was 106/69, she is afebrile, and no other medication changes have been implemented since our last visit.      MEDICATIONS:  Current Outpatient Medications   Medication Sig Dispense Refill     acetaminophen (TYLENOL) 500 MG tablet Take 1,000 mg by mouth 3 times daily       bisacodyl (DULCOLAX) 10 MG suppository Place 10 mg rectally daily as needed for constipation       bisacodyl (DULCOLAX) 5 MG EC tablet Take 5 mg by mouth daily (and a second time if needed)       busPIRone (BUSPAR) 5 MG tablet Take 5 mg by mouth 2 times daily as needed for anxiety       carbidopa-levodopa (SINEMET)  MG tablet TAKE 2 TABLETS BY MOUTH THREE TIMES DAILY 180 tablet 11     clotrimazole (LOTRIMIN) 1 % external cream Apply topically 2 times daily as needed       diclofenac (VOLTAREN) 1 % topical gel Apply 4 g topically 4 times daily as needed for moderate pain (to back and neck) 100 g 11     DULoxetine (CYMBALTA) 20 MG capsule Take 60 mg by mouth daily       gabapentin (NEURONTIN) 100 MG capsule Take 300 mg by mouth 3 times daily        hydrochlorothiazide (HYDRODIURIL) 25 MG tablet TAKE 1 TABLET BY MOUTH EVERY DAY DX HYPERTENSION AND LE EDEMA 31 tablet 11     levothyroxine (SYNTHROID/LEVOTHROID) 100 MCG tablet Take 1 tablet (100 mcg) by mouth daily       losartan (COZAAR) 50 MG tablet TAKE 1 TABLET BY MOUTH EVERY MORNING 31 tablet 11     oxyCODONE (ROXICODONE) 5 MG tablet Take 1 tablet (5 mg) by mouth every 4 hours as needed for severe pain 30 tablet 0     Polyethylene Glycol 3350 (MIRALAX PO) Take 1 capful by mouth daily 17 GM       polyethylene glycol-propylene glycol  "(SYSTANE ULTRA) 0.4-0.3 % SOLN ophthalmic solution Place 2 drops into both eyes 3 times daily (and additionally as needed/requested)       QUEtiapine (SEROQUEL) 25 MG tablet Take 1 tablet (25 mg) by mouth daily as needed (agitation, delusions)       QUEtiapine (SEROQUEL) 50 MG tablet Take 0.5 tablets (25 mg) by mouth At Bedtime       VITAMIN D3 25 MCG (1000 UT) tablet TAKE 2 TABLETS (2000IU) BY MOUTH EVERY DAY DX OSTEOPOROSIS 60 tablet 11     Zinc Oxide 13 % CREA Apply topically daily      REVIEW OF SYSTEMS: Per family and nursing as noted above.  Objective: Ht 1.676 m (5' 6\")   Wt 70.2 kg (154 lb 12.8 oz)   BMI 24.99 kg/m    Limited visit exam done given COVID-19 precautions.   Labs:   Recent labs in EPIC reviewed by me today.   Assessment/Plan:  1. Multiple system atrophy P (H)  2. Lewy body dementia without behavioral disturbance (H)  3. VAMSHI (generalized anxiety disorder)  4. Episode of recurrent major depressive disorder, unspecified depression episode severity (H)  5. Fall, subsequent encounter  6. Compression fracture of L4 vertebra, initial encounter (H)  7. Essential hypertension  8. Primary osteoarthritis involving multiple joints  9. Polypharmacy  Acute-on-chronic. Ongoing.    Provider discussed plan of care with daughter Irasema (decision-maker).     We discussed striking a balance between medicating for comfort while also maintaining safety.     We discussed that given Mehreen's arthritis and fractures, there may come a time where she shouldn't walk independently.     We noted that with Mehreen's now established Dementia diagnosis, that there may come a time where Mehreen may require skilled nursing with more care in the near future.    We discussed monitoring/surviellence, provider follow-up, and family communication (several other children involved with several other concerns).   Provider to follow-up w/in 1 week or as needed.     Phone call duration:  23 minutes    Electronically signed by:  Dr. Gonzalez " HAILEE Parry CNP DNP                  Sincerely,        HAILEE Langley CNP

## 2020-10-30 NOTE — TELEPHONE ENCOUNTER
"Patient was discharged back to assisted living with the prescription below. Back on 10/27.    QUEtiapine (SEROQUEL) 50 MG tablet   10/12/2020  No   Sig - Route: Take 0.5 tablets (25 mg) by mouth At Bedtime - Oral     QUEtiapine (SEROQUEL) 25 MG tablet   10/23/2020  No   Sig - Route: Take 1 tablet (25 mg) by mouth daily as needed (agitation, delusions) - Oral     Chasidy reports the following:    \"There is a fine line with mom's blood pressures - she gets \"delusional\" when pressure drops.\" She is falling a lot perhaps, due to MSA and medication. They are giving Seroquel due to behaviors and same hallucination that she has. Daughter does not think her mom is all the way awake when she acts out and thinks she is getting too much Seroquel.     I told Chasidy that her mom is going to need more help and a higher level of care than an \"assisted living\". She verbalized understanding and that the family is looking at other facilities.  ---------------------  Called [facility] and spoke to RN, Piper Saldaña confirmed that the patient is getting 25 mg tab Seroquel at bedtime and 25 mg as needed during day. So far they have not given her any Seroquel during the day.    Patient came back to [place] on 10/27 and fell at 4:00 am 10/28.     Piper does not report any acting out behaviors, they check on her every 4 hours, and she has had only 5 falls recorded for 2020.          "

## 2020-10-30 NOTE — TELEPHONE ENCOUNTER
Attempted to reach Eladio to recommend that she make an appointment with me to discuss further (hopefully next week). Left voicemail encouraging her to call and schedule an appointment with me to discuss medications.      It appears Eladio was able to talk with BOBBY Whitt in our clinic today.     Arianne Melendrez, Pharm.D.  Medication Therapy Management Pharmacist  Phone: [phone]

## 2020-10-31 NOTE — PROGRESS NOTES
"Telephone visit  [facility]  Mehreen Camara is a 80 year old year old adult who is being evaluated via a billable telephone visit due to the restrictions of the Covid-19 pandemic. The patient has been notified of following: \"This telephone visit will be conducted via a call between you and your provider. We have found that certain health care needs can be provided without the need for a physical exam. This service lets us provide the care you need with a short phone conversation. If a prescription is necessary we will work with the facility you are at and can send it directly to your pharmacy. If lab work is needed we can place an order to have it drawn at the facility you are at. If during the course of the call the provider feels a telephone visit is not appropriate, you will not be charged for this service.\"     Patient or Patient's first contact has given verbal consent for Telephone visit?  Yes    Place of Location at the time of visit: [facility].  Mehreen Camara complains of :   Chief Complaint   Patient presents with     Nursing Home Acute   I have reviewed and updated the patient's Past Medical History, Social History, Family History and Medication List.  ALLERGIES:   Allergies   Allergen Reactions     Penicillins Anaphylaxis, Rash and Shortness Of Breath     Aspirin Nausea and GI Disturbance     Atenolol Cough     Atorvastatin Muscle Pain (Myalgia) and Nausea and Vomiting     Bupropion Nausea     Clindamycin Other (See Comments)     Constipation      Codeine Nausea     Ibuprofen Nausea     Stomach upset     Lovastatin Muscle Pain (Myalgia)     Cephalexin Rash     Neck reddness      HPI: HPI information obtained from: facility chart records, facility staff, patient report, [facility] chart review and Care Everywhere [facility] chart review.      Mehreen reporting to family that she has fallen multiple times since returning home and sometimes even multiple times per " night. She has also reported to family overall weakness, light-headedness, and low BPs. Nursing reports that Mehreen has one recorded fall on 10/27 since her return to her apartment from [facility], and 5 falls total in 2020. Her most recent BP was 106/69, she is afebrile, and no other medication changes have been implemented since our last visit.      MEDICATIONS:  Current Outpatient Medications   Medication Sig Dispense Refill     acetaminophen (TYLENOL) 500 MG tablet Take 1,000 mg by mouth 3 times daily       bisacodyl (DULCOLAX) 10 MG suppository Place 10 mg rectally daily as needed for constipation       bisacodyl (DULCOLAX) 5 MG EC tablet Take 5 mg by mouth daily (and a second time if needed)       busPIRone (BUSPAR) 5 MG tablet Take 5 mg by mouth 2 times daily as needed for anxiety       carbidopa-levodopa (SINEMET)  MG tablet TAKE 2 TABLETS BY MOUTH THREE TIMES DAILY 180 tablet 11     clotrimazole (LOTRIMIN) 1 % external cream Apply topically 2 times daily as needed       diclofenac (VOLTAREN) 1 % topical gel Apply 4 g topically 4 times daily as needed for moderate pain (to back and neck) 100 g 11     DULoxetine (CYMBALTA) 20 MG capsule Take 60 mg by mouth daily       gabapentin (NEURONTIN) 100 MG capsule Take 300 mg by mouth 3 times daily        hydrochlorothiazide (HYDRODIURIL) 25 MG tablet TAKE 1 TABLET BY MOUTH EVERY DAY DX HYPERTENSION AND LE EDEMA 31 tablet 11     levothyroxine (SYNTHROID/LEVOTHROID) 100 MCG tablet Take 1 tablet (100 mcg) by mouth daily       losartan (COZAAR) 50 MG tablet TAKE 1 TABLET BY MOUTH EVERY MORNING 31 tablet 11     oxyCODONE (ROXICODONE) 5 MG tablet Take 1 tablet (5 mg) by mouth every 4 hours as needed for severe pain 30 tablet 0     Polyethylene Glycol 3350 (MIRALAX PO) Take 1 capful by mouth daily 17 GM       polyethylene glycol-propylene glycol (SYSTANE ULTRA) 0.4-0.3 % SOLN ophthalmic solution Place 2 drops into both eyes 3 times daily (and additionally as  "needed/requested)       QUEtiapine (SEROQUEL) 25 MG tablet Take 1 tablet (25 mg) by mouth daily as needed (agitation, delusions)       QUEtiapine (SEROQUEL) 50 MG tablet Take 0.5 tablets (25 mg) by mouth At Bedtime       VITAMIN D3 25 MCG (1000 UT) tablet TAKE 2 TABLETS (2000IU) BY MOUTH EVERY DAY DX OSTEOPOROSIS 60 tablet 11     Zinc Oxide 13 % CREA Apply topically daily      REVIEW OF SYSTEMS: Per family and nursing as noted above.  Objective: Ht 1.676 m (5' 6\")   Wt 70.2 kg (154 lb 12.8 oz)   BMI 24.99 kg/m    Limited visit exam done given COVID-19 precautions.   Labs:   Recent labs in EPIC reviewed by me today.   Assessment/Plan:  1. Multiple system atrophy P (H)  2. Lewy body dementia without behavioral disturbance (H)  3. VAMSHI (generalized anxiety disorder)  4. Episode of recurrent major depressive disorder, unspecified depression episode severity (H)  5. Fall, subsequent encounter  6. Compression fracture of L4 vertebra, initial encounter (H)  7. Essential hypertension  8. Primary osteoarthritis involving multiple joints  9. Polypharmacy  Acute-on-chronic. Ongoing.    Provider discussed plan of care with daughter Irasema (decision-maker).     We discussed striking a balance between medicating for comfort while also maintaining safety.     We discussed that given Mehreen's arthritis and fractures, there may come a time where she shouldn't walk independently.     We noted that with Mehreen's now established Dementia diagnosis, that there may come a time where Mehreen may require skilled nursing with more care in the near future.    We discussed monitoring/surviellence, provider follow-up, and family communication (several other children involved with several other concerns).   Provider to follow-up w/in 1 week or as needed.     Phone call duration:  23 minutes    Electronically signed by:  Dr. Lisa Parry, HAILEE CNP DNP            "

## 2020-11-02 ENCOUNTER — VIRTUAL VISIT (OUTPATIENT)
Dept: GERIATRICS | Facility: CLINIC | Age: 80
End: 2020-11-02
Payer: COMMERCIAL

## 2020-11-02 VITALS
OXYGEN SATURATION: 98 % | WEIGHT: 154 LBS | BODY MASS INDEX: 24.86 KG/M2 | TEMPERATURE: 97.7 F | DIASTOLIC BLOOD PRESSURE: 66 MMHG | SYSTOLIC BLOOD PRESSURE: 106 MMHG | RESPIRATION RATE: 16 BRPM | HEART RATE: 59 BPM

## 2020-11-02 DIAGNOSIS — F41.1 GAD (GENERALIZED ANXIETY DISORDER): ICD-10-CM

## 2020-11-02 DIAGNOSIS — G31.83 LEWY BODY DEMENTIA WITHOUT BEHAVIORAL DISTURBANCE (H): Primary | ICD-10-CM

## 2020-11-02 DIAGNOSIS — F33.9 EPISODE OF RECURRENT MAJOR DEPRESSIVE DISORDER, UNSPECIFIED DEPRESSION EPISODE SEVERITY (H): ICD-10-CM

## 2020-11-02 DIAGNOSIS — Z79.899 POLYPHARMACY: ICD-10-CM

## 2020-11-02 DIAGNOSIS — M15.0 PRIMARY OSTEOARTHRITIS INVOLVING MULTIPLE JOINTS: ICD-10-CM

## 2020-11-02 DIAGNOSIS — S32.040A COMPRESSION FRACTURE OF L4 VERTEBRA, INITIAL ENCOUNTER (H): ICD-10-CM
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
## 2020-11-02 DIAGNOSIS — F02.80 LEWY BODY DEMENTIA WITHOUT BEHAVIORAL DISTURBANCE (H): Primary | ICD-10-CM

## 2020-11-02 DIAGNOSIS — G23.2 MULTIPLE SYSTEM ATROPHY P (H): ICD-10-CM

## 2020-11-02 DIAGNOSIS — K59.01 SLOW TRANSIT CONSTIPATION: ICD-10-CM

## 2020-11-02 DIAGNOSIS — R42 DIZZINESS: ICD-10-CM

## 2020-11-02 DIAGNOSIS — I10 ESSENTIAL HYPERTENSION: ICD-10-CM

## 2020-11-02 RX ORDER — AMOXICILLIN 250 MG
1 CAPSULE ORAL 2 TIMES DAILY
Status: ON HOLD | COMMUNITY
End: 2020-12-29

## 2020-11-02 NOTE — LETTER
"    11/2/2020        RE: Mehreen Camara  [facility]  [address]  [facility] [address]        [facility] Video Visit  Mehreen Camara is a 80 year old female who is being evaluated via a billable video visit due to the restrictions of the COVID19 pandemic.The patient has been notified of following: \"This video visit will be conducted via a call between you and your provider. We have found that certain health care needs can be provided without the need for an in-person physical exam.  This service lets us provide the care you need with a video conversation.  If a prescription is necessary we can send it directly to your pharmacy.  If lab work is needed we can place an order for that and you can then stop by our lab to have the test done at a later time. If during the course of the call the provider feels a video visit is not appropriate, you will not be charged for this service.\"     Proivder has received verbal consent for a Video Visit from the patient? Yes    Patient/facility would like the video invitation sent by: Send to e-mail at: [email or web address]    This visit took place virtually, with the patient located at [facility].  ________________________________________________  Video Start Time: 1209  Mehreen Camara complains of    Chief Complaint   Patient presents with     Video Visit     RECHECK   HPI/Subjective:  Mehreen seen today after her family reached out to provider with concerns about falls, dizziness, BPs, and new medications.    Today, Mehreen states that she is constipated. She denies trouble with her appetite, nausea, or GERD. She denies SOB, fever, CP. She explains that when she had her fall last week, she did have increased pain for a few days, but now she is back to her baseline -  which she reminds provider \"Is not normal.\" She states that she sometimes feels \"dizzy\" but she says it's not \"real dizziness\" \"it's like being " "light-headed.\" She states this has been a problem for a long time. She is always worried about her blood pressure and states that it goes too low. Chart review shows her last readings were WDL. She denies specific numbness/tingling, she denies any specific weakness.     History: I have reviewed and updated the patient's Past Medical History, Social History, Family History and Medication List.  ALLERGIES: Penicillins, Aspirin, Atenolol, Atorvastatin, Bupropion, Clindamycin, Codeine, Ibuprofen, Lovastatin, and Cephalexin   Current Outpatient Medications   Medication Sig Dispense Refill     acetaminophen (TYLENOL) 500 MG tablet Take 1,000 mg by mouth 3 times daily       bisacodyl (DULCOLAX) 10 MG suppository Place 10 mg rectally daily as needed for constipation       bisacodyl (DULCOLAX) 5 MG EC tablet Take 5 mg by mouth daily (and a second time if needed)       busPIRone (BUSPAR) 5 MG tablet Take 5 mg by mouth 2 times daily as needed for anxiety       carbidopa-levodopa (SINEMET)  MG tablet TAKE 2 TABLETS BY MOUTH THREE TIMES DAILY 180 tablet 11     clotrimazole (LOTRIMIN) 1 % external cream Apply topically 2 times daily as needed       diclofenac (VOLTAREN) 1 % topical gel Apply 4 g topically 4 times daily as needed for moderate pain (to back and neck) 100 g 11     DULoxetine (CYMBALTA) 20 MG capsule Take 60 mg by mouth daily       gabapentin (NEURONTIN) 100 MG capsule Take 300 mg by mouth 3 times daily        hydrochlorothiazide (HYDRODIURIL) 25 MG tablet TAKE 1 TABLET BY MOUTH EVERY DAY DX HYPERTENSION AND LE EDEMA 31 tablet 11     levothyroxine (SYNTHROID/LEVOTHROID) 100 MCG tablet Take 1 tablet (100 mcg) by mouth daily       losartan (COZAAR) 50 MG tablet TAKE 1 TABLET BY MOUTH EVERY MORNING 31 tablet 11     oxyCODONE (ROXICODONE) 5 MG tablet Take 1 tablet (5 mg) by mouth every 4 hours as needed for severe pain 30 tablet 0     Polyethylene Glycol 3350 (MIRALAX PO) Take 1 capful by mouth daily 17 GM       " polyethylene glycol-propylene glycol (SYSTANE ULTRA) 0.4-0.3 % SOLN ophthalmic solution Place 2 drops into both eyes 3 times daily (and additionally as needed/requested)       QUEtiapine (SEROQUEL) 25 MG tablet Take 1 tablet (25 mg) by mouth daily as needed (agitation, delusions)       QUEtiapine (SEROQUEL) 50 MG tablet Take 0.5 tablets (25 mg) by mouth At Bedtime       VITAMIN D3 25 MCG (1000 UT) tablet TAKE 2 TABLETS (2000IU) BY MOUTH EVERY DAY DX OSTEOPOROSIS 60 tablet 11     Zinc Oxide 13 % CREA Apply topically daily        REVIEW OF SYSTEMS: Limited secondary to cognitive impairment but today pt reports the above and 4 point ROS including Respiratory, CV, GI and , other than that noted in the HPI, is negative.    Objective:  /66   Pulse 59   Temp 97.7  F (36.5  C)   Resp 16   Wt 69.9 kg (154 lb)   SpO2 98%   BMI 24.86 kg/m    GENERAL APPEARANCE: Alert, in no distress, cooperative.   EYES/ENT: EOM normal on camera, hearing acuity [place].  RESP: Respiratory effort appears unlabored over video screen, no respiratory distress apparent on video, On RA.  ABDOMEN: Appears non-distended, rounded.  SKIN: Skin appears baseline w/ video inspection. No wounds/rashes noted.    NEURO: CN II-XII at patient's baseline, MARMOLEJO at baseline on video. Sensation baseline PPS.  PSYCH: Insight, judgement, and memory are baseline impaired, affect and mood are happy/engaged/anxious.    Laboratory/Diagnostics: Reviewed in Epic by provider today.     Assessment/Plan:  Lewy body dementia without behavioral disturbance (H)  Multiple system atrophy P (H)  Episode of recurrent major depressive disorder, unspecified depression episode severity (H)  VAMSHI (generalized anxiety disorder)  Fall, subsequent encounter  Primary osteoarthritis involving multiple joints  Compression fracture of L4 vertebra, initial encounter (H)  Essential hypertension  Polypharmacy  Dizziness  Constipation  Acute-on-chronic.  Ongoing.    Dizziness/light-headedness is likely a mixture of orthostasis and multiple system atrophy.     We advised Mehreen and staff to push fluids to stay well hydrated, ask for help when she feels dizzy to avoid falls, rise from sitting slowly, and clearing her apartment of clutter to prevent trips/slips.     We note her anxiety/depression/dementia is much improved from prior, she was not tearful, tangential, or making paranoid statements. We note that this is likely secondary to both better pain control and addition of Seroquel.     We note constipation has been a constant problem for Mehreen. Oxycodone is likely contributing to this. We will alter her regimen as noted below.    We expect that Mehreen's dementia and MSA will progress further, and we counseled Mehreen on asking for more help and relying on others as her disease progresses.     Per previous phone visit with Mehreen's daughter Irasema, we anticipate that Mehreen will need memory care or long term care for safety and wellness reasons as her disease process moves forward.  Follow-up routinely or as needed.     Orders:  1. Discontinue bisacodyl tabs.  2. Senna S 1 tab PO at bedtime. Dx: constipation.  3. Senna S 1 tab PO BID PRN. Dx: constipation.     Video-Visit Details  Type of service:  Video Visit  Video Start Time: 1209  Video End Time (time video stopped): 1232  Originating Location (pt. Location): Assisted Living  Distant Location (provider location):  [facility]ED [facility]   Mode of Communication:  Video Conference.    Electronically signed by:  HAILEE Gilbert CNP [facility]              Sincerely,        HAILEE Langley CNP

## 2020-11-02 NOTE — PROGRESS NOTES
"[facility] Video Visit  Mehreen Camara is a 80 year old female who is being evaluated via a billable video visit due to the restrictions of the COVID19 pandemic.The patient has been notified of following: \"This video visit will be conducted via a call between you and your provider. We have found that certain health care needs can be provided without the need for an in-person physical exam.  This service lets us provide the care you need with a video conversation.  If a prescription is necessary we can send it directly to your pharmacy.  If lab work is needed we can place an order for that and you can then stop by our lab to have the test done at a later time. If during the course of the call the provider feels a video visit is not appropriate, you will not be charged for this service.\"     [facility] has received verbal consent for a Video Visit from the patient? Yes    Patient/facility would like the video invitation sent by: Send to e-mail at: [email or web address]    This visit took place virtually, with the patient located at [facility].  ________________________________________________  Video Start Time: 1209  Mehreen Camara complains of    Chief Complaint   Patient presents with     Video Visit     RECHECK   HPI/Subjective:  Mehreen seen today after her family reached out to provider with concerns about falls, dizziness, BPs, and new medications.    Today, Mehreen states that she is constipated. She denies trouble with her appetite, nausea, or GERD. She denies SOB, fever, CP. She explains that when she had her fall last week, she did have increased pain for a few days, but now she is back to her baseline -  which she reminds provider \"Is not normal.\" She states that she sometimes feels \"dizzy\" but she says it's not \"real dizziness\" \"it's like being light-headed.\" She states this has been a problem for a long time. She is always worried about her blood pressure and states " that it goes too low. Chart review shows her last readings were WDL. She denies specific numbness/tingling, she denies any specific weakness.     History: I have reviewed and updated the patient's Past Medical History, Social History, Family History and Medication List.  ALLERGIES: Penicillins, Aspirin, Atenolol, Atorvastatin, Bupropion, Clindamycin, Codeine, Ibuprofen, Lovastatin, and Cephalexin   Current Outpatient Medications   Medication Sig Dispense Refill     acetaminophen (TYLENOL) 500 MG tablet Take 1,000 mg by mouth 3 times daily       bisacodyl (DULCOLAX) 10 MG suppository Place 10 mg rectally daily as needed for constipation       bisacodyl (DULCOLAX) 5 MG EC tablet Take 5 mg by mouth daily (and a second time if needed)       busPIRone (BUSPAR) 5 MG tablet Take 5 mg by mouth 2 times daily as needed for anxiety       carbidopa-levodopa (SINEMET)  MG tablet TAKE 2 TABLETS BY MOUTH THREE TIMES DAILY 180 tablet 11     clotrimazole (LOTRIMIN) 1 % external cream Apply topically 2 times daily as needed       diclofenac (VOLTAREN) 1 % topical gel Apply 4 g topically 4 times daily as needed for moderate pain (to back and neck) 100 g 11     DULoxetine (CYMBALTA) 20 MG capsule Take 60 mg by mouth daily       gabapentin (NEURONTIN) 100 MG capsule Take 300 mg by mouth 3 times daily        hydrochlorothiazide (HYDRODIURIL) 25 MG tablet TAKE 1 TABLET BY MOUTH EVERY DAY DX HYPERTENSION AND LE EDEMA 31 tablet 11     levothyroxine (SYNTHROID/LEVOTHROID) 100 MCG tablet Take 1 tablet (100 mcg) by mouth daily       losartan (COZAAR) 50 MG tablet TAKE 1 TABLET BY MOUTH EVERY MORNING 31 tablet 11     oxyCODONE (ROXICODONE) 5 MG tablet Take 1 tablet (5 mg) by mouth every 4 hours as needed for severe pain 30 tablet 0     Polyethylene Glycol 3350 (MIRALAX PO) Take 1 capful by mouth daily 17 GM       polyethylene glycol-propylene glycol (SYSTANE ULTRA) 0.4-0.3 % SOLN ophthalmic solution Place 2 drops into both eyes 3 times  daily (and additionally as needed/requested)       QUEtiapine (SEROQUEL) 25 MG tablet Take 1 tablet (25 mg) by mouth daily as needed (agitation, delusions)       QUEtiapine (SEROQUEL) 50 MG tablet Take 0.5 tablets (25 mg) by mouth At Bedtime       VITAMIN D3 25 MCG (1000 UT) tablet TAKE 2 TABLETS (2000IU) BY MOUTH EVERY DAY DX OSTEOPOROSIS 60 tablet 11     Zinc Oxide 13 % CREA Apply topically daily        REVIEW OF SYSTEMS: Limited secondary to cognitive impairment but today pt reports the above and 4 point ROS including Respiratory, CV, GI and , other than that noted in the HPI, is negative.    Objective:  /66   Pulse 59   Temp 97.7  F (36.5  C)   Resp 16   Wt 69.9 kg (154 lb)   SpO2 98%   BMI 24.86 kg/m    GENERAL APPEARANCE: Alert, in no distress, cooperative.   EYES/ENT: EOM normal on camera, hearing acuity [place].  RESP: Respiratory effort appears unlabored over video screen, no respiratory distress apparent on video, On RA.  ABDOMEN: Appears non-distended, rounded.  SKIN: Skin appears baseline w/ video inspection. No wounds/rashes noted.    NEURO: CN II-XII at patient's baseline, MARMOLEJO at baseline on video. Sensation baseline PPS.  PSYCH: Insight, judgement, and memory are baseline impaired, affect and mood are happy/engaged/anxious.    Laboratory/Diagnostics: Reviewed in Epic by provider today.     Assessment/Plan:  Lewy body dementia without behavioral disturbance (H)  Multiple system atrophy P (H)  Episode of recurrent major depressive disorder, unspecified depression episode severity (H)  VAMSHI (generalized anxiety disorder)  Fall, subsequent encounter  Primary osteoarthritis involving multiple joints  Compression fracture of L4 vertebra, initial encounter (H)  Essential hypertension  Polypharmacy  Dizziness  Constipation  Acute-on-chronic. Ongoing.    Dizziness/light-headedness is likely a mixture of orthostasis and multiple system atrophy.     We advised Mehreen and staff to push fluids to stay  well hydrated, ask for help when she feels dizzy to avoid falls, rise from sitting slowly, and clearing her apartment of clutter to prevent trips/slips.     We note her anxiety/depression/dementia is much improved from prior, she was not tearful, tangential, or making paranoid statements. We note that this is likely secondary to both better pain control and addition of Seroquel.     We note constipation has been a constant problem for Mehreen. Oxycodone is likely contributing to this. We will alter her regimen as noted below.    We expect that Mehreen's dementia and MSA will progress further, and we counseled Mehreen on asking for more help and relying on others as her disease progresses.     Per previous phone visit with Mehreen's daughter Irasema, we anticipate that Mehreen will need memory care or long term care for safety and wellness reasons as her disease process moves forward.  Follow-up routinely or as needed.     Orders:  1. Discontinue bisacodyl tabs.  2. Senna S 1 tab PO at bedtime. Dx: constipation.  3. Senna S 1 tab PO BID PRN. Dx: constipation.     Video-Visit Details  Type of service:  Video Visit  Video Start Time: 1209  Video End Time (time video stopped): 1232  Originating Location (pt. Location): Assisted Living  Distant Location (provider location):  [facility] ASSISTED LIV[facility]   Mode of Communication:  Video Conference.    Electronically signed by:  Dr. Lisa Parry, HAILEE CNP DNP

## 2020-11-03 ENCOUNTER — VIRTUAL VISIT (OUTPATIENT)
Dept: PHARMACY | Facility: CLINIC | Age: 80
End: 2020-11-03
Payer: MEDICAID

## 2020-11-03 DIAGNOSIS — F29 PSYCHOSIS, UNSPECIFIED PSYCHOSIS TYPE (H): ICD-10-CM

## 2020-11-03 DIAGNOSIS — K59.01 SLOW TRANSIT CONSTIPATION: ICD-10-CM

## 2020-11-03 DIAGNOSIS — F03.90 DEMENTIA WITHOUT BEHAVIORAL DISTURBANCE, UNSPECIFIED DEMENTIA TYPE: ICD-10-CM

## 2020-11-03 DIAGNOSIS — I10 ESSENTIAL HYPERTENSION: ICD-10-CM

## 2020-11-03 DIAGNOSIS — K58.9 IRRITABLE BOWEL SYNDROME, UNSPECIFIED TYPE: ICD-10-CM

## 2020-11-03 DIAGNOSIS — G23.8 MULTIPLE SYSTEM ATROPHY (H): Primary | ICD-10-CM

## 2020-11-03 DIAGNOSIS — G90.3 MULTIPLE SYSTEM ATROPHY (H): Primary | ICD-10-CM

## 2020-11-03 DIAGNOSIS — F33.9 EPISODE OF RECURRENT MAJOR DEPRESSIVE DISORDER, UNSPECIFIED DEPRESSION EPISODE SEVERITY (H): ICD-10-CM

## 2020-11-03 DIAGNOSIS — E55.9 VITAMIN D DEFICIENCY: ICD-10-CM

## 2020-11-03 DIAGNOSIS — S32.040A COMPRESSION FRACTURE OF L4 VERTEBRA, INITIAL ENCOUNTER (H): ICD-10-CM

## 2020-11-03 DIAGNOSIS — K22.70 BARRETT'S ESOPHAGUS WITHOUT DYSPLASIA: ICD-10-CM

## 2020-11-03 DIAGNOSIS — E03.9 HYPOTHYROIDISM, UNSPECIFIED TYPE: ICD-10-CM

## 2020-11-03 DIAGNOSIS — R52 PAIN: ICD-10-CM

## 2020-11-03 PROCEDURE — 99606 MTMS BY PHARM EST 15 MIN: CPT | Mod: TEL | Performed by: PHARMACIST

## 2020-11-03 PROCEDURE — 99607 MTMS BY PHARM ADDL 15 MIN: CPT | Mod: TEL | Performed by: PHARMACIST

## 2020-11-03 RX ORDER — OXYCODONE HYDROCHLORIDE 5 MG/1
5 TABLET ORAL EVERY 4 HOURS PRN
Qty: 30 TABLET | Refills: 0 | Status: SHIPPED | OUTPATIENT
Start: 2020-11-03 | End: 2020-11-11

## 2020-11-03 NOTE — PROGRESS NOTES
MTM ENCOUNTER  SUBJECTIVE/OBJECTIVE:                           eMhreen Camara is a 80 year old female called for a follow-up visit. She was referred to me from Dr. Monterroso.  Today's visit is a follow-up MTM visit from 6/15/20. Visit today conducted with daughter, Eladio, due to patient's cognitive impairment (consent to communicate on file).     Reason for visit: Medication review; Eladio reached out because shaking 3 days in a row    Allergies/ADRs: Reviewed in chart  Tobacco: She reports that she quit smoking about 25 years ago. Her smoking use included cigarettes. She smoked 0.50 packs per day. She has never used smokeless tobacco.  Alcohol: none  Caffeine: not discussed  Activity: minimal  Past Medical History: Reviewed in chart  Social: Patient currently living at [facility]. Moiz has never been able to talk with nurses at the TCU care center. There is frustration about lack of communication and changing nursing staff. They have concern about going to nursing home there due to communication issues. She feels she has given them the benefit of the doubt. They are reportedly terrified to move Mehreen somewhere else.    Medication Adherence/Access:   Patient has assistance with medication administration: assisted living.  Patient takes medications 4 time(s) per day.   Per patient, misses medication 0 times per week but reportedly receives medication late frequently.   Medication barriers: none.   The patient fills medications at [place]: NO, fills medications at [facility].    MSA: Currently taking carbidopa-levodopa  mg 2 tablets 3 times/day, 9 am, 12-12:30 pm, and 6:30 pm. She has had 4 falls in the last 5 months - has had to go to the ER and sometimes to the TCU after falls/fractures. She has been quarantined in the TCU for 6 of the last 9 months - increased isolation and mood issues. Mehreen has reported that she feels a difference when she is late to take a dose. Her movement is slower  "without the medication and she feels \"like crap.\" She has reportedly that it takes 1 hour for the medication to kick in. Her medication may be given to her 45 minutes late per daughter's recount. Mehreen talks about feeling weak and shaky but she does not want to report falls to staff so she can avoid going to the TCU again. Daughter is requesting the neuropsych report be mailed to her as she never received it.     Dementia/psychosis: Currently taking quetiapine 25 mg nightly at bedtime. Daughter reports patient is also getting quetiapine 12.5 mg in the morning and mid-day but order appears to be for as needed doses during the day. Daughter states patient is groggy in the morning and Mehreen has told her that quetiapine is making her too sedated. Patient has also endorsed more night terrors and sleep walking. Daughter is concerned about her sleeping much of the day and when staff enter her room when she's sleeping she seems to have more outbursts because she is so sedated. Mehreen has said \"off the wall things\" when she is awoken out of sleep during the day and daughter thinks the staff have blown her behaviors out of proportion.  Daughter thinks that if Mehreen was awoken softly it will help minimize her behavioral outbursts.     Depression: Currently taking duloxetine 60 mg daily, buspirone 5 mg twice daily as needed for anxiety (unsure how often she is taking this). She was previously on sertraline but this has been stopped. Daughter is wondering if buspirone can affect blood pressure or cause constipation. Daughter states patient's mood has been down given the ongoing isolation.      Constipation/IBS: Currently taking senna-S 8.6-50 mg daily and as needed. She is able to keep Miralax in her apartment and can self-administer. Daughter thinks she is able to self-administer but wants to know if she is actually using it. Was told she is having something thick (thickener?) added to the Miralax. She saw gastroenterologist " last week. Having more trouble with constipation lately - she has been less active and taking oxycodone which is constipating. She had an enema last night and it didn't seem to help. Feeling bloated today. She has a suppository - unsure if using. Simethicone was stopped 10/23/20.      Pain: Currently taking acetaminophen 1000 mg 3 times/day, gabapentin 300 mg 3 times/day, oxycodone 5 mg every 4 hours as needed, and Voltaren gel. Apparently nobody was helping her put on the Voltaren gel so she is self-administering now. She is trying to decrease how often she takes oxycodone because she wants to prevent worsening of constipation but she does report that oxycodone helps her pain quite a lot. Patient's most recent fracture was acute L4 compression fracture. She has scoliosis. Daughter mentioned she is concerned about drug diversion - has brought this to the attention of administration at the assisted living. Daughter provided an example of when the nursing home gave Mehreen a dose of pain medication in an envelope but there was only acetaminophen inside even when they claimed it was oxycodone.       GERD/Harris's esophagus: Current medications include: Tums as needed (Not sure if using these). Pantoprazole was discontinued. Daughter notes no GI symptoms recently.     Hypothyroidism: Patient is taking levothyroxine 100 mcg daily. Daughter states thyroid was removed in the past and she is concerned about the current dose of levothyroxine. Patient is having the following symptoms: none.   9/21/20: TSH was 1.43     Hypertension/edema: Current medications include hydrochlorothiazide  25 mg daily and losartan 50 mg daily.  Patient has blood pressure monitored by assisted living facility. Patient gets more confused and vision changes when blood pressure is down. Daughter is not sure where her blood pressure has been running lately. Daughter states that Mehreen has admitted to her that she feels like she is going to pass out at  times. No issues with edema noted recently.   BP Readings from Last 3 Encounters:   11/02/20 106/66   10/23/20 106/66   10/13/20 (!) 89/57       Vitamin D deficiency: Currently taking Vitamin D3 25 mcg, 2 tablets daily. No recent vitamin D labs available in Epic.     Today's Vitals: There were no vitals taken for this visit.    ASSESSMENT:                            Medication Adherence: assisted living facility may not be administering medications in a timely manner; will emphasize the importance of doing so.     MSA: patient would benefit from getting carbidopa-levodopa on time. MTM pharmacist to request more timely administration of medication from assisted living. Provided therapeutic presence to patient's daughter regarding the many frustrations she expressed today and reassured her that we are here for Mehreen and her family and willing to work collaboratively to help improve her quality of life.     Dementia/psychosis: will need to confirm with assisted living how often she is actually taking the daytime quetiapine. May be able to increase the daytime dose and eliminate the day doses given the increase in daytime sedation. It is unclear if quetiapine is contributing to vivid dreams. It will be important to optimize the patient's sleep at night and minimize daytime sleeping to reduce Sundowning and agitation during the day.     Depression: difficult to assess today but reassured daughter that the patient's antidepressant regimen is appropriate and likely not contributing to constipation. It is possible that duloxetine is contributing to hypertension but will need to assess recent blood pressure readings.      Constipation/IBS: difficult to assess with the daughter today; need to confirm frequency of Miralax use and frequency of patient's BM's. While on opioids, patient is likely to have more difficulty with constipation.      Pain: appears stable but unable to fully assess without discussing with patient.  Patient's PCP has been concerned about opioid dependence and is trying to limit use of opioids for the patient which I would agree with. It is unclear if there is drug diversion going on but will raise this concern with the patient's PCP.      GERD/Harris's esophagus: stable    Hypothyroidism: stable; reassured daughter that current dose of levothyroxine is appropriate    Hypertension/edema: appears blood pressure may be running low; will attempt to gather recent blood pressure readings from assisted living facility to assess necessity of ongoing antihypertensive medications    Vitamin D deficiency: unable to fully assess but seems like an appropriate dose    PLAN:                            1. It is very important that each dose of carbidopa-levodopa is given on time. We will discuss this with the assisted living facility.   2. We will confirm with assisted living facility how often Mehreen is actually getting the daytime quetiapine (Seroquel). If she is getting it frequently we may recommend moving any daytime doses to nighttime to minimize daytime sleepiness.   3. I will discuss with HAILEE Bowers CNP DNP regarding concern of assisted living not appropriately administering oxycodone and possible opioid diversion.   4. I will verify with assisted living that they are assisting with constipation management so we know how often Mehreen is taking Miralax.   5. Provided Eladio with phone number for medical records to obtain neuropsych report (update on 11/4/20 - Dr. Bullard has mailed family a copy of the report).     I spent 90 minutes with this patient today. All changes were made via collaborative practice agreement with Dr. Monterroso. A copy of the visit note was provided to the patient's referring provider.    Will follow up in one month: 12/8/20    The patient was sent via JOOR a summary of these recommendations.     Arianne Melendrez, Pharm.D.  Medication Therapy Management Pharmacist  Phone:  [phone]    Patient consented to a telehealth visit: yes  Telemedicine Visit Details  Type of service:  Telephone visit  Start Time: 3:05 PM  End Time: 4:35 PM  Originating Location (patient location): [place]  Distant Location (provider location):  [facility]  Mode of Communication:  Telephone

## 2020-11-04 ENCOUNTER — HOSPITAL ENCOUNTER (OUTPATIENT)
Dept: CARDIOLOGY | Facility: CLINIC | Age: 80
Discharge: HOME | End: 2020-11-04
Admitting: NURSE PRACTITIONER
Payer: COMMERCIAL

## 2020-11-04 DIAGNOSIS — I35.0 NONRHEUMATIC AORTIC VALVE STENOSIS: ICD-10-CM

## 2020-11-04 PROCEDURE — 93306 TTE W/DOPPLER COMPLETE: CPT | Mod: 26 | Performed by: INTERNAL MEDICINE

## 2020-11-04 PROCEDURE — 93306 TTE W/DOPPLER COMPLETE: CPT

## 2020-11-05 ENCOUNTER — AMBULATORY - HEALTHEAST (OUTPATIENT)
Dept: CARDIOLOGY | Facility: CLINIC | Age: 80
End: 2020-11-05

## 2020-11-05 ENCOUNTER — COMMUNICATION - HEALTHEAST (OUTPATIENT)
Dept: CARDIOLOGY | Facility: CLINIC | Age: 80
End: 2020-11-05

## 2020-11-09 ENCOUNTER — VIRTUAL VISIT (OUTPATIENT)
Dept: GERIATRICS | Facility: CLINIC | Age: 80
End: 2020-11-09
Payer: COMMERCIAL

## 2020-11-09 DIAGNOSIS — F41.1 GAD (GENERALIZED ANXIETY DISORDER): ICD-10-CM

## 2020-11-09 DIAGNOSIS — Z79.899 POLYPHARMACY: ICD-10-CM

## 2020-11-09 DIAGNOSIS — R42 DIZZINESS: ICD-10-CM

## 2020-11-09 DIAGNOSIS — F43.22 ADJUSTMENT DISORDER WITH ANXIOUS MOOD: ICD-10-CM

## 2020-11-09 DIAGNOSIS — G31.83 LEWY BODY DEMENTIA WITHOUT BEHAVIORAL DISTURBANCE (H): Primary | ICD-10-CM

## 2020-11-09 DIAGNOSIS — F02.80 LEWY BODY DEMENTIA WITHOUT BEHAVIORAL DISTURBANCE (H): Primary | ICD-10-CM

## 2020-11-09 DIAGNOSIS — Z63.9 DYSFUNCTIONAL FAMILY PROCESSES: ICD-10-CM

## 2020-11-09 DIAGNOSIS — G23.2 MULTIPLE SYSTEM ATROPHY P (H): ICD-10-CM

## 2020-11-09 PROCEDURE — 99358 PROLONG SERVICE W/O CONTACT: CPT | Mod: TEL | Performed by: NURSE PRACTITIONER

## 2020-11-09 RX ORDER — QUETIAPINE FUMARATE 50 MG/1
25 TABLET, FILM COATED ORAL AT BEDTIME
Qty: 100 TABLET
Start: 2020-11-09 | End: 2020-12-08

## 2020-11-09 SDOH — SOCIAL STABILITY - SOCIAL INSECURITY: PROBLEM RELATED TO PRIMARY SUPPORT GROUP, UNSPECIFIED: Z63.9

## 2020-11-09 NOTE — PATIENT INSTRUCTIONS
Recommendations from today's MTM visit:                                                    MTM (medication therapy management) is a service provided by a clinical pharmacist designed to help you get the most of out of your medicines.     1. It is very important that each dose of carbidopa-levodopa is given on time. We will discuss this with the assisted living facility.   2. We will confirm with assisted living facility how often Mehreen is actually getting the daytime quetiapine (Seroquel). If she is getting it frequently we may recommend moving any daytime doses to nighttime to minimize daytime sleepiness.   3. I will discuss with HAILEE Bowers CNP DNP regarding concern of assisted living not appropriately administering oxycodone and possible opioid diversion.   4. I will verify with assisted living that they are assisting with constipation management so we know how often Mehreen is taking Miralax.   5. Provided Eladio with phone number for medical records to obtain neuropsych report (update on 11/4/20 - Dr. Bullard has mailed family a copy of the report).     It was great to speak with you today.  I value your experience and would be very thankful for your time with providing feedback on our clinic survey. You may receive a survey via email or text message in the next few days.     Next MTM visit: 12/8/20 at 2:30 pm - by phone with Eladio    To schedule another MTM appointment, please call the clinic directly or you may call the MTM scheduling line at [phone] or toll-free at [phone].     My Clinical Pharmacist's contact information:                                                      It was a pleasure talking with you today!  Please feel free to contact me with any questions or concerns you have.      Arianne Melendrez, Pharm.D.  Medication Therapy Management Pharmacist  Phone: [phone]

## 2020-11-10 ENCOUNTER — VIRTUAL VISIT (OUTPATIENT)
Dept: GERIATRICS | Facility: CLINIC | Age: 80
End: 2020-11-10
Payer: COMMERCIAL

## 2020-11-10 DIAGNOSIS — F02.80 LEWY BODY DEMENTIA WITHOUT BEHAVIORAL DISTURBANCE (H): ICD-10-CM

## 2020-11-10 DIAGNOSIS — G31.83 LEWY BODY DEMENTIA WITHOUT BEHAVIORAL DISTURBANCE (H): ICD-10-CM

## 2020-11-10 DIAGNOSIS — G23.2 MULTIPLE SYSTEM ATROPHY P (H): ICD-10-CM

## 2020-11-10 DIAGNOSIS — Z63.9 DYSFUNCTIONAL FAMILY PROCESSES: Primary | ICD-10-CM

## 2020-11-10 DIAGNOSIS — R42 DIZZINESS: ICD-10-CM

## 2020-11-10 DIAGNOSIS — S32.040A COMPRESSION FRACTURE OF L4 VERTEBRA, INITIAL ENCOUNTER (H): ICD-10-CM

## 2020-11-10 DIAGNOSIS — Z79.899 POLYPHARMACY: ICD-10-CM

## 2020-11-10 PROCEDURE — 99358 PROLONG SERVICE W/O CONTACT: CPT | Performed by: NURSE PRACTITIONER

## 2020-11-10 SDOH — SOCIAL STABILITY - SOCIAL INSECURITY: PROBLEM RELATED TO PRIMARY SUPPORT GROUP, UNSPECIFIED: Z63.9

## 2020-11-10 NOTE — PROGRESS NOTES
[facility]  NON-FACE TO FACE PROLONGED SERVICE  Mehreen Camara 1940  Date of Related Face to Face Service: 11/11/20  INTENT OF SERVICE: This is an extended evaluation of patient's specific problem. The service relates to a recent or future E/M visit. Documentation supports medical necessity, relates to ongoing patient management and documents start times and stop times.    Regarding the following diagnoses:     Lewy body dementia without behavioral disturbance (H)  Multiple system atrophy P (H)  VAMSHI (generalized anxiety disorder)  Dizziness  Polypharmacy  Dysfunctional family processes,     On 11/9:    I discussed with Nicholas (son) who is POA of the patient.      (Start time 1702  Stop time 1723) = 21 minutes.     I coordinated care with Mikel .      (Start time 1517  Stop time 1531) = 14 minutes.     I coordinated care with Floresita nurse at facility.    (Start time 1422  Stop time 1443) = 21 minutes     ASSESSMENT/PLAN     Lewy body dementia without behavioral disturbance (H)  Multiple system atrophy P (H)  VAMSHI (generalized anxiety disorder)  Dizziness  Polypharmacy  Dysfunctional family processes  Acute. Ongoing.  Provider received numerous threatening messages from this patient's family member (Daughter-Eladio) with several care requests that were confusing in nature. There is no appointed MPOA, but son Nicholas states he is POA and sister Irasema is the decision-maker family has agreed upon (5 children). Another one of Mehreen's daughter's is attempting to disrupt her care plan.    In effort for simplicity and safety, and after discussions with the various parties noted above, shared decision-making is to go through Mehreen's daughter Irasema, with son Nicholas available as second. Mehreen can also participate in shared decision-making, as she is able to verbalize wishes despite dementia.     Given multiple accusations made, and confusion w/ plan of care, provider will plan a  virtual visit to further assess Mehreen's health. Provider will also coordinate care with specialists (neurology, PharmD, cardiology, nursing, and family). Follow-up w/ visit or as needed.     TIME: Total time spent with Non-Face to Face prolonged service 56 minutes.     Electronically signed by:  HAILEE Gilbert CNP DNP

## 2020-11-10 NOTE — LETTER
11/10/2020        RE: Mehreen Camara  [facility]  [address]  [facility] [address]        [place] [facility]  NON-FACE TO FACE PROLONGED SERVICE  Mehreen Camara 1940  Date of Related Face to Face Service: 11/11/20  INTENT OF SERVICE  This is an extended evaluation of patient's specific problem.  The service relates to a recent or future E/M visit.  Documentation supports medical necessity, relates to ongoing patient management and documents start times and stop times.    Regarding the following diagnoses:     Dysfunctional family processes  Dizziness  Multiple system atrophy P (H)  Lewy body dementia without behavioral disturbance (H)  Polypharmacy  Compression fracture of L4 vertebra, initial encounter (H),     I discussed with Nicholas (son) who is POA of the patient.      (Start time 0838  Stop time 0850) = 12 minutes.     I coordinated care with nursing team: Floresita via messaging.      (Start time 1217  Stop time 1219) = 2 minutes.    I discussed with Irasema (daughter) who is decision-maker of the patient.    (Start time 1557  Stop time 1614) = 17 minutes.     I coordinated care with Neurology & PharmD via messaging.      (Start time 1645  Stop time 1652) = 7 minutes.    ASSESSMENT/PLAN     Dysfunctional family processes  Dizziness  Multiple system atrophy P (H)  Lewy body dementia without behavioral disturbance (H)  Polypharmacy  Compression fracture of L4 vertebra, initial encounter (H)  Acute.    Provider, building , and nursing received a confusing and somewhat threatening email message from daughter Eladio (who is not a designated patient decision-maker). Email contained demands on med changes, care plan changes etc.Other family was included on this email.    Patient's son and POA Nicholas reached out to provider by phone, initiating contact. He apologized for his sister Eladio' email to the care team and advised again that care decisions should go through  Irasema, or himself, not his sister Eladio. He advised that we do not engage, as he knows his sister.    Provider coordinated care with nursing team at facility, as accusations in email were significant.     Provider contacted decision-maker/daughter Irasema. Irasema echoed Nicholas's thoughts as well. She requested that provider follow-up as appropriate and to be kept in-the-loop on medication changes.     Given Mehreen's MSA, provider coordinated care with neurology and PharmD prior to scheduled visit tomorrow for specialist input via Epic.   Follow-up w/ video visit tomorrow.     TIME  Total time spent with Non-Face to Face prolonged service 38 minutes.     Electronically signed by:  HAILEE Gilbert CNP DNP                    Sincerely,        HAILEE Langley CNP

## 2020-11-11 ENCOUNTER — VIRTUAL VISIT (OUTPATIENT)
Dept: GERIATRICS | Facility: CLINIC | Age: 80
End: 2020-11-11
Payer: COMMERCIAL

## 2020-11-11 VITALS
OXYGEN SATURATION: 98 % | TEMPERATURE: 97.7 F | WEIGHT: 154 LBS | SYSTOLIC BLOOD PRESSURE: 106 MMHG | BODY MASS INDEX: 24.86 KG/M2 | RESPIRATION RATE: 16 BRPM | HEART RATE: 59 BPM | DIASTOLIC BLOOD PRESSURE: 66 MMHG

## 2020-11-11 DIAGNOSIS — G89.29 CHRONIC BILATERAL LOW BACK PAIN WITH BILATERAL SCIATICA: ICD-10-CM

## 2020-11-11 DIAGNOSIS — F41.1 GAD (GENERALIZED ANXIETY DISORDER): ICD-10-CM

## 2020-11-11 DIAGNOSIS — M54.42 CHRONIC BILATERAL LOW BACK PAIN WITH BILATERAL SCIATICA: ICD-10-CM

## 2020-11-11 DIAGNOSIS — M15.0 PRIMARY OSTEOARTHRITIS INVOLVING MULTIPLE JOINTS: ICD-10-CM

## 2020-11-11 DIAGNOSIS — G23.2 MULTIPLE SYSTEM ATROPHY P (H): ICD-10-CM

## 2020-11-11 DIAGNOSIS — K59.01 SLOW TRANSIT CONSTIPATION: ICD-10-CM

## 2020-11-11 DIAGNOSIS — G31.83 LEWY BODY DEMENTIA WITHOUT BEHAVIORAL DISTURBANCE (H): Primary | ICD-10-CM

## 2020-11-11 DIAGNOSIS — F33.9 EPISODE OF RECURRENT MAJOR DEPRESSIVE DISORDER, UNSPECIFIED DEPRESSION EPISODE SEVERITY (H): ICD-10-CM

## 2020-11-11 DIAGNOSIS — M54.41 CHRONIC BILATERAL LOW BACK PAIN WITH BILATERAL SCIATICA: ICD-10-CM

## 2020-11-11 DIAGNOSIS — Z79.899 POLYPHARMACY: ICD-10-CM

## 2020-11-11 DIAGNOSIS — R42 DIZZINESS: ICD-10-CM

## 2020-11-11 DIAGNOSIS — Z63.9 DYSFUNCTIONAL FAMILY PROCESSES: ICD-10-CM

## 2020-11-11 DIAGNOSIS — F02.80 LEWY BODY DEMENTIA WITHOUT BEHAVIORAL DISTURBANCE (H): Primary | ICD-10-CM

## 2020-11-11 RX ORDER — OXYCODONE HYDROCHLORIDE 5 MG/1
TABLET ORAL
Qty: 30 TABLET | Refills: 0 | Status: SHIPPED | OUTPATIENT
Start: 2020-11-11 | End: 2020-11-19

## 2020-11-11 SDOH — SOCIAL STABILITY - SOCIAL INSECURITY: PROBLEM RELATED TO PRIMARY SUPPORT GROUP, UNSPECIFIED: Z63.9

## 2020-11-11 NOTE — PROGRESS NOTES
[facility]  NON-FACE TO FACE PROLONGED SERVICE  Mehreen Camara 1940  Date of Related Face to Face Service: 11/11/20  INTENT OF SERVICE  This is an extended evaluation of patient's specific problem.  The service relates to a recent or future E/M visit.  Documentation supports medical necessity, relates to ongoing patient management and documents start times and stop times.    Regarding the following diagnoses:     Dysfunctional family processes  Dizziness  Multiple system atrophy P (H)  Lewy body dementia without behavioral disturbance (H)  Polypharmacy  Compression fracture of L4 vertebra, initial encounter (H),     I discussed with Nicholas (son) who is POA of the patient.      (Start time 0838  Stop time 0850) = 12 minutes.     I coordinated care with nursing team: Floresita via messaging.      (Start time 1217  Stop time 1219) = 2 minutes.    I discussed with Irasema (daughter) who is decision-maker of the patient.    (Start time 1557  Stop time 1614) = 17 minutes.     I coordinated care with Neurology & PharmD via messaging.      (Start time 1645  Stop time 1652) = 7 minutes.    ASSESSMENT/PLAN     Dysfunctional family processes  Dizziness  Multiple system atrophy P (H)  Lewy body dementia without behavioral disturbance (H)  Polypharmacy  Compression fracture of L4 vertebra, initial encounter (H)  Acute.    Provider, building , and nursing received a confusing and somewhat threatening email message from daughter Eladio (who is not a designated patient decision-maker). Email contained demands on med changes, care plan changes etc.Other family was included on this email.    Patient's son and POA Nicholas reached out to provider by phone, initiating contact. He apologized for his sister Eladio' email to the care team and advised again that care decisions should go through Irasema, or himself, not his sister Eladio. He advised that we do not engage, as he knows his sister.    Provider  coordinated care with nursing team at facility, as accusations in email were significant.     Provider contacted decision-maker/daughter Irasema. Irasema echoed Nicholas's thoughts as well. She requested that provider follow-up as appropriate and to be kept in-the-loop on medication changes.     Given Mehreen's MSA, provider coordinated care with neurology and PharmD prior to scheduled visit tomorrow for specialist input via Epic.   Follow-up w/ video visit tomorrow.     TIME  Total time spent with Non-Face to Face prolonged service 38 minutes.     Electronically signed by:  Dr. Lisa Parry, HAILEE CNP DNP

## 2020-11-11 NOTE — LETTER
"    11/11/2020        RE: Mehreen Camara  [facility]  [address]  [facility] [address]        [facility] Geriatrics Video Visit  Mehreen Camara is a 80 year old female who is being evaluated via a billable video visit due to the restrictions of the COVID19 pandemic.The patient has been notified of following: \"This video visit will be conducted via a call between you and your provider. We have found that certain health care needs can be provided without the need for an in-person physical exam.  This service lets us provide the care you need with a video conversation.  If a prescription is necessary we can send it directly to your pharmacy.  If lab work is needed we can place an order for that and you can then stop by our lab to have the test done at a later time. If during the course of the call the provider feels a video visit is not appropriate, you will not be charged for this service.\"     Proivder has received verbal consent for a Video Visit from the patient? Yes    Patient/facility would like the video invitation sent by: Send to e-mail at: [email or web address]    This visit took place virtually, with the patient located at [facility].  ________________________________________________  Video Start Time: 1125  Mehreen Camara complains of    Chief Complaint   Patient presents with     Video Visit     Episodic   HPI/Subjective:  Mehreen seen today after several days of troublesome family dynamics, which caused confusion for care (see several non-f2f documentations). She is seen on follow-up today, after family expressed concerns about dizziness, pain, and seroquel.     Today, Mehreen states that she is still constipated, and would like a little more help with this. She is ok with increasing her SennaS a little bit. She is wondering if she needs more Oxycodone. We talked about if PRN use increases significantly, we can consider OxyContin. Provider coordinated " "care with nursing, and they states that Mehreen is using 2-4 doses (5mg/ea) of Oxycodone/day. She could definitely use more if she needs to.     We note that her BPs fluctuate from low 100's to 160's systolic. She describes her dizziness as \"light-headedness.\" She states it does not happen daily. One of her main complaints, is significant fatigue in the AM after she's received her morning medications. We note, Sinemet can also contribute to this. She denies SOB, fever, CP.  She denies fever/chills, cough. Chart review shows her last readings were WDL.  BPs:  11/13: 155/58  11/6179/106 (unclear if this was rechecked or treated).   10/30: 135/77  10/9: 129/77     We note Mehreen's dementia at baseline, but we also note that she has some capacity to participate in her decision-making.     When asked who she would like to help her with healthcare decision-making, she specified her daughter Irasema as her decision-maker.     Provider specifically asked about her other children (Eladio, Nicholas), and Mehreen reiterated that Irasema should be her decision-maker. This is what was known to us, but we note no known formal MPOA has been drafted.     Mehreen acknowledged that Nicholas is her financial POA. She states her daughter Christine was \"too stressed out\" to handle extra responsibility.     Nurse Saldaña was witness to this conversation.     History: I have reviewed and updated the patient's Past Medical History, Social History, Family History and Medication List.  ALLERGIES: Penicillins, Aspirin, Atenolol, Atorvastatin, Bupropion, Clindamycin, Codeine, Ibuprofen, Lovastatin, and Cephalexin   Current Outpatient Medications   Medication Sig Dispense Refill     acetaminophen (TYLENOL) 500 MG tablet Take 1,000 mg by mouth 3 times daily       bisacodyl (DULCOLAX) 10 MG suppository Place 10 mg rectally daily as needed for constipation       carbidopa-levodopa (SINEMET)  MG tablet TAKE 2 TABLETS BY MOUTH THREE TIMES DAILY 180 tablet 11 "     clotrimazole (LOTRIMIN) 1 % external cream Apply topically 2 times daily as needed       diclofenac (VOLTAREN) 1 % topical gel Apply 4 g topically 4 times daily as needed for moderate pain (to back and neck) 100 g 11     DULoxetine (CYMBALTA) 20 MG capsule Take 60 mg by mouth daily       gabapentin (NEURONTIN) 100 MG capsule Take 300 mg by mouth 3 times daily        hydrochlorothiazide (HYDRODIURIL) 25 MG tablet TAKE 1 TABLET BY MOUTH EVERY DAY DX HYPERTENSION AND LE EDEMA 31 tablet 11     levothyroxine (SYNTHROID/LEVOTHROID) 100 MCG tablet Take 1 tablet (100 mcg) by mouth daily       losartan (COZAAR) 50 MG tablet TAKE 1 TABLET BY MOUTH EVERY MORNING 31 tablet 11     oxyCODONE (ROXICODONE) 5 MG tablet TAKE 1 TABLET BY MOUTH EVERY 4 HOURS AS NEEDED FOR SEVERE PAIN 30 tablet 0     Polyethylene Glycol 3350 (MIRALAX PO) Take 1 capful by mouth daily as needed        polyethylene glycol-propylene glycol (SYSTANE ULTRA) 0.4-0.3 % SOLN ophthalmic solution Place 2 drops into both eyes 3 times daily (and additionally as needed/requested)       QUEtiapine (SEROQUEL) 50 MG tablet Take 0.5 tablets (25 mg) by mouth At Bedtime 100 tablet      senna-docusate (SENOKOT-S/PERICOLACE) 8.6-50 MG tablet Take 1 tablet by mouth daily AND 1 tab BID PRN for constipation       VITAMIN D3 25 MCG (1000 UT) tablet TAKE 2 TABLETS (2000IU) BY MOUTH EVERY DAY DX OSTEOPOROSIS 60 tablet 11     Zinc Oxide 13 % CREA Apply topically daily        REVIEW OF SYSTEMS: Limited secondary to cognitive impairment but today pt reports the above and 4 point ROS including Respiratory, CV, GI and , other than that noted in the HPI, is negative.    Objective:  /66   Pulse 59   Temp 97.7  F (36.5  C)   Resp 16   Wt 69.9 kg (154 lb)   SpO2 98%   BMI 24.86 kg/m    GENERAL APPEARANCE: Alert, in no distress, cooperative.   EYES/ENT: EOM normal on camera, hearing acuity [place].  RESP: Respiratory effort appears unlabored over video screen, no respiratory  distress apparent on video, On RA.  ABDOMEN: Appears non-distended, rounded.  SKIN: Skin appears baseline w/ video inspection. No wounds/rashes noted.    NEURO: CN II-XII at patient's baseline, MARMOLEJO at baseline on video. Sensation baseline PPS.  PSYCH: Insight, judgement, and memory are baseline impaired, affect and mood are happy/engaged/anxious.    Laboratory/Diagnostics: Reviewed in Epic by provider today.     Assessment/Plan:  Lewy body dementia without behavioral disturbance (H)  Multiple system atrophy P (H)  Episode of recurrent major depressive disorder, unspecified depression episode severity (H)  VAMSHI (generalized anxiety disorder)  Fall, subsequent encounter  Primary osteoarthritis involving multiple joints  Back pain w/ bilateral sciatica   Essential hypertension  Polypharmacy  Dizziness  Constipation  Dysfunctional family dynamics  Chronic. Ongoing.    Dizziness/light-headedness is likely a mixture of orthostasis and multiple system atrophy.     We coordinated care with neurology and PharmD. Neurologist recommended considering GDR of Sinemet, and PharmD discussed appropriate dosing for this.      We note her anxiety/depression/dementia is much improved from prior, she was not tearful, tangential, or making paranoid statements. We note that this is likely secondary to both better pain control and addition of Seroquel.     We note constipation has been a constant problem for Mehreen. Oxycodone is likely contributing to this. We will alter her regimen as noted below.    We expect that Mehreen's dementia and MSA will progress further, and we counseled Mehreen on asking for more help and relying on others as her disease progresses. She clearly stated who her healthcare advocate should be among her children with a witness present.    We note Mehreen can have much more Oxycodone that she is not utilizing.    We will increase her scheduled HS gabapentin, which will likely help her sleep and assist with pain.     We  will schedule Voltaren, as she is likely not asking for this before a pill, and staff can assist positioning and skin check during this time.     Should she max out on PRN use, very low-dose OxyContin can be considered.    We note that no PRN Buspar has been needed, and therefore we will discontinue to simplify care plan.    Follow-up w/in 1 week or as needed.     Orders:  1. Increase scheduled Senna S to 1 tab PO BID (PRN stays the same). Dx: constipation.   2. Discontinue PRN Buspar.  3. Increase Gabapentin to 300mg QAM, Qnoon, and 600mg at bedtime. Dx: pain.  4. Schedule Voltaren 1%, apply to neck and back BID, and change PRN order to BID PRN. Dx: OA.   FYI: May consider Sinemet change per neurology at next visit.     Total time spent with patient visit was 35 min including patient visit and review of past records. Greater than 50% of total time spent with counseling and coordinating care with nursing, PharmD, and neurology as noted above.     Video-Visit Details  Type of service:  Video Visit  Video Start Time: 1125  Video End Time (time video stopped): 1142  Originating Location (pt. Location): Assisted Living  Distant Location (provider location):  [facility] ASSISTED LIV[facility]   Mode of Communication:  Video Conference.    Electronically signed by:  HAILEE Gilbert CNP DNP                    Sincerely,        HAILEE Langley CNP

## 2020-11-11 NOTE — PROGRESS NOTES
"[facility] Geriatrics Video Visit  Mehreen Camara is a 80 year old female who is being evaluated via a billable video visit due to the restrictions of the COVID19 pandemic.The patient has been notified of following: \"This video visit will be conducted via a call between you and your provider. We have found that certain health care needs can be provided without the need for an in-person physical exam.  This service lets us provide the care you need with a video conversation.  If a prescription is necessary we can send it directly to your pharmacy.  If lab work is needed we can place an order for that and you can then stop by our lab to have the test done at a later time. If during the course of the call the provider feels a video visit is not appropriate, you will not be charged for this service.\"     Proivder has received verbal consent for a Video Visit from the patient? Yes    Patient/facility would like the video invitation sent by: Send to e-mail at: [email or web address]    This visit took place virtually, with the patient located at [facility].  ________________________________________________  Video Start Time: 1125  Mehreen Camara complains of    Chief Complaint   Patient presents with     Video Visit     Episodic   HPI/Subjective:  Mehreen seen today after several days of troublesome family dynamics, which caused confusion for care (see several non-f2f documentations). She is seen on follow-up today, after family expressed concerns about dizziness, pain, and seroquel.     Today, Mehreen states that she is still constipated, and would like a little more help with this. She is ok with increasing her SennaS a little bit. She is wondering if she needs more Oxycodone. We talked about if PRN use increases significantly, we can consider OxyContin. Provider coordinated care with nursing, and they states that Mehreen is using 2-4 doses (5mg/ea) of Oxycodone/day. She could definitely use " "more if she needs to.     We note that her BPs fluctuate from low 100's to 160's systolic. She describes her dizziness as \"light-headedness.\" She states it does not happen daily. One of her main complaints, is significant fatigue in the AM after she's received her morning medications. We note, Sinemet can also contribute to this. She denies SOB, fever, CP.  She denies fever/chills, cough. Chart review shows her last readings were WDL.  BPs:  11/13: 155/58  11/6179/106 (unclear if this was rechecked or treated).   10/30: 135/77  10/9: 129/77     We note Mehreen's dementia at baseline, but we also note that she has some capacity to participate in her decision-making.     When asked who she would like to help her with healthcare decision-making, she specified her daughter Irasema as her decision-maker.     Provider specifically asked about her other children (Eladio, Nicholas), and Mehreen reiterated that Irasema should be her decision-maker. This is what was known to us, but we note no known formal MPOA has been drafted.     Mehreen acknowledged that Nicholas is her financial POA. She states her daughter Christine was \"too stressed out\" to handle extra responsibility.     Nurse Piper was witness to this conversation.     History: I have reviewed and updated the patient's Past Medical History, Social History, Family History and Medication List.  ALLERGIES: Penicillins, Aspirin, Atenolol, Atorvastatin, Bupropion, Clindamycin, Codeine, Ibuprofen, Lovastatin, and Cephalexin   Current Outpatient Medications   Medication Sig Dispense Refill     acetaminophen (TYLENOL) 500 MG tablet Take 1,000 mg by mouth 3 times daily       bisacodyl (DULCOLAX) 10 MG suppository Place 10 mg rectally daily as needed for constipation       carbidopa-levodopa (SINEMET)  MG tablet TAKE 2 TABLETS BY MOUTH THREE TIMES DAILY 180 tablet 11     clotrimazole (LOTRIMIN) 1 % external cream Apply topically 2 times daily as needed       diclofenac (VOLTAREN) 1 % " topical gel Apply 4 g topically 4 times daily as needed for moderate pain (to back and neck) 100 g 11     DULoxetine (CYMBALTA) 20 MG capsule Take 60 mg by mouth daily       gabapentin (NEURONTIN) 100 MG capsule Take 300 mg by mouth 3 times daily        hydrochlorothiazide (HYDRODIURIL) 25 MG tablet TAKE 1 TABLET BY MOUTH EVERY DAY DX HYPERTENSION AND LE EDEMA 31 tablet 11     levothyroxine (SYNTHROID/LEVOTHROID) 100 MCG tablet Take 1 tablet (100 mcg) by mouth daily       losartan (COZAAR) 50 MG tablet TAKE 1 TABLET BY MOUTH EVERY MORNING 31 tablet 11     oxyCODONE (ROXICODONE) 5 MG tablet TAKE 1 TABLET BY MOUTH EVERY 4 HOURS AS NEEDED FOR SEVERE PAIN 30 tablet 0     Polyethylene Glycol 3350 (MIRALAX PO) Take 1 capful by mouth daily as needed        polyethylene glycol-propylene glycol (SYSTANE ULTRA) 0.4-0.3 % SOLN ophthalmic solution Place 2 drops into both eyes 3 times daily (and additionally as needed/requested)       QUEtiapine (SEROQUEL) 50 MG tablet Take 0.5 tablets (25 mg) by mouth At Bedtime 100 tablet      senna-docusate (SENOKOT-S/PERICOLACE) 8.6-50 MG tablet Take 1 tablet by mouth daily AND 1 tab BID PRN for constipation       VITAMIN D3 25 MCG (1000 UT) tablet TAKE 2 TABLETS (2000IU) BY MOUTH EVERY DAY DX OSTEOPOROSIS 60 tablet 11     Zinc Oxide 13 % CREA Apply topically daily        REVIEW OF SYSTEMS: Limited secondary to cognitive impairment but today pt reports the above and 4 point ROS including Respiratory, CV, GI and , other than that noted in the HPI, is negative.    Objective:  /66   Pulse 59   Temp 97.7  F (36.5  C)   Resp 16   Wt 69.9 kg (154 lb)   SpO2 98%   BMI 24.86 kg/m    GENERAL APPEARANCE: Alert, in no distress, cooperative.   EYES/ENT: EOM normal on camera, hearing acuity [place].  RESP: Respiratory effort appears unlabored over video screen, no respiratory distress apparent on video, On RA.  ABDOMEN: Appears non-distended, rounded.  SKIN: Skin appears baseline w/ video  inspection. No wounds/rashes noted.    NEURO: CN II-XII at patient's baseline, MARMOLEJO at baseline on video. Sensation baseline PPS.  PSYCH: Insight, judgement, and memory are baseline impaired, affect and mood are happy/engaged/anxious.    Laboratory/Diagnostics: Reviewed in Epic by provider today.     Assessment/Plan:  Lewy body dementia without behavioral disturbance (H)  Multiple system atrophy P (H)  Episode of recurrent major depressive disorder, unspecified depression episode severity (H)  VAMSHI (generalized anxiety disorder)  Fall, subsequent encounter  Primary osteoarthritis involving multiple joints  Back pain w/ bilateral sciatica   Essential hypertension  Polypharmacy  Dizziness  Constipation  Dysfunctional family dynamics  Chronic. Ongoing.    Dizziness/light-headedness is likely a mixture of orthostasis and multiple system atrophy.     We coordinated care with neurology and PharmD. Neurologist recommended considering GDR of Sinemet, and PharmD discussed appropriate dosing for this.      We note her anxiety/depression/dementia is much improved from prior, she was not tearful, tangential, or making paranoid statements. We note that this is likely secondary to both better pain control and addition of Seroquel.     We note constipation has been a constant problem for Mehreen. Oxycodone is likely contributing to this. We will alter her regimen as noted below.    We expect that Mehreen's dementia and MSA will progress further, and we counseled Mehreen on asking for more help and relying on others as her disease progresses. She clearly stated who her healthcare advocate should be among her children with a witness present.    We note Mehreen can have much more Oxycodone that she is not utilizing.    We will increase her scheduled HS gabapentin, which will likely help her sleep and assist with pain.     We will schedule Voltaren, as she is likely not asking for this before a pill, and staff can assist positioning and  skin check during this time.     Should she max out on PRN use, very low-dose OxyContin can be considered.    We note that no PRN Buspar has been needed, and therefore we will discontinue to simplify care plan.    Follow-up w/in 1 week or as needed.     Orders:  1. Increase scheduled Senna S to 1 tab PO BID (PRN stays the same). Dx: constipation.   2. Discontinue PRN Buspar.  3. Increase Gabapentin to 300mg QAM, Qnoon, and 600mg at bedtime. Dx: pain.  4. Schedule Voltaren 1%, apply to neck and back BID, and change PRN order to BID PRN. Dx: OA.   FYI: May consider Sinemet change per neurology at next visit.     Total time spent with patient visit was 35 min including patient visit and review of past records. Greater than 50% of total time spent with counseling and coordinating care with nursing, PharmD, and neurology as noted above.     Video-Visit Details  Type of service:  Video Visit  Video Start Time: 1125  Video End Time (time video stopped): 1142  Originating Location (pt. Location): Assisted Living  Distant Location (provider location):  [facility] ASSISTED LIVING   Mode of Communication:  Video Conference.    Electronically signed by:  Dr. Lisa Parry, APRN CNP DNP

## 2020-11-18 ENCOUNTER — VIRTUAL VISIT (OUTPATIENT)
Dept: GERIATRICS | Facility: CLINIC | Age: 80
End: 2020-11-18
Payer: COMMERCIAL

## 2020-11-18 VITALS — TEMPERATURE: 98.4 F | WEIGHT: 159.2 LBS | BODY MASS INDEX: 25.7 KG/M2 | OXYGEN SATURATION: 96 %

## 2020-11-18 DIAGNOSIS — G90.3 MULTIPLE SYSTEM ATROPHY (H): ICD-10-CM

## 2020-11-18 DIAGNOSIS — G31.83 LEWY BODY DEMENTIA WITHOUT BEHAVIORAL DISTURBANCE (H): ICD-10-CM

## 2020-11-18 DIAGNOSIS — R42 DIZZINESS: ICD-10-CM

## 2020-11-18 DIAGNOSIS — K59.01 SLOW TRANSIT CONSTIPATION: ICD-10-CM

## 2020-11-18 DIAGNOSIS — F33.9 EPISODE OF RECURRENT MAJOR DEPRESSIVE DISORDER, UNSPECIFIED DEPRESSION EPISODE SEVERITY (H): ICD-10-CM

## 2020-11-18 DIAGNOSIS — M15.0 PRIMARY OSTEOARTHRITIS INVOLVING MULTIPLE JOINTS: ICD-10-CM

## 2020-11-18 DIAGNOSIS — M54.41 CHRONIC BILATERAL LOW BACK PAIN WITH BILATERAL SCIATICA: ICD-10-CM

## 2020-11-18 DIAGNOSIS — Z79.899 POLYPHARMACY: ICD-10-CM

## 2020-11-18 DIAGNOSIS — G23.8 MULTIPLE SYSTEM ATROPHY (H): ICD-10-CM

## 2020-11-18 DIAGNOSIS — F41.1 GAD (GENERALIZED ANXIETY DISORDER): ICD-10-CM

## 2020-11-18 DIAGNOSIS — F02.80 LEWY BODY DEMENTIA WITHOUT BEHAVIORAL DISTURBANCE (H): ICD-10-CM

## 2020-11-18 DIAGNOSIS — M54.42 CHRONIC BILATERAL LOW BACK PAIN WITH BILATERAL SCIATICA: ICD-10-CM

## 2020-11-18 DIAGNOSIS — G23.2 MULTIPLE SYSTEM ATROPHY P (H): Primary | ICD-10-CM

## 2020-11-18 DIAGNOSIS — G89.29 CHRONIC BILATERAL LOW BACK PAIN WITH BILATERAL SCIATICA: ICD-10-CM

## 2020-11-18 PROCEDURE — 99207 PR CDG-MDM COMPONENT: MEETS MODERATE - UP CODED: CPT | Performed by: NURSE PRACTITIONER

## 2020-11-18 NOTE — PROGRESS NOTES
"[facility] Geriatrics Video Visit  Mehreen Camara is a 80 year old female who is being evaluated via a billable video visit due to the restrictions of the COVID19 pandemic.The patient has been notified of following: \"This video visit will be conducted via a call between you and your provider. We have found that certain health care needs can be provided without the need for an in-person physical exam.  This service lets us provide the care you need with a video conversation.  If a prescription is necessary we can send it directly to your pharmacy.  If lab work is needed we can place an order for that and you can then stop by our lab to have the test done at a later time. If during the course of the call the provider feels a video visit is not appropriate, you will not be charged for this service.\"     [employer] has received verbal consent for a Video Visit from the patient? Yes    Patient/facility would like the video invitation sent by: Send to e-mail at: [email or web address]    This visit took place virtually, with the patient located at [facility].  ________________________________________________  Video Start Time: 1323  Mehreen Camara complains of    Chief Complaint   Patient presents with     Video Visit     RECHECK   HPI/Subjective:  Mehreen seen on follow-up today after many happenings last week. We had adjusted her bowel medications, consulted PharmD and neurology. We also slightly increased her Gabapentin (which could help w/ pain/anxiety/sleep) at . We scheduled some topical Voltaren for joint relief. We received a recommendations from neurology in the meantime.     Today, Mehreen is having a very good day she says. She states her bowels have been better with the medication changes from last week. She states her pain is \"pretty good.\" She states intermittent dizziness is a problem, but it is not bothering her today. She denies fever, SOB, headache, nausea. Chart review shows " her VS have been stable, with some fluctuations in BP (which we have noted in the past). Nursing does not report new behaviors.    History: I have reviewed and updated the patient's Past Medical History, Social History, Family History and Medication List.  ALLERGIES: Penicillins, Aspirin, Atenolol, Atorvastatin, Bupropion, Clindamycin, Codeine, Ibuprofen, Lovastatin, and Cephalexin   Current Outpatient Medications   Medication Sig Dispense Refill     acetaminophen (TYLENOL) 500 MG tablet Take 1,000 mg by mouth 3 times daily       bisacodyl (DULCOLAX) 10 MG suppository Place 10 mg rectally daily as needed for constipation       carbidopa-levodopa (SINEMET)  MG tablet TAKE 2 TABLETS BY MOUTH THREE TIMES DAILY 180 tablet 11     clotrimazole (LOTRIMIN) 1 % external cream Apply topically 2 times daily as needed       diclofenac (VOLTAREN) 1 % topical gel Apply 2 g topically 2 times daily. May also apply 2 g 2 times daily as needed for moderate pain. 100 g 0     DULoxetine (CYMBALTA) 20 MG capsule Take 60 mg by mouth daily       gabapentin (NEURONTIN) 100 MG capsule Take 300 mg by mouth 2 times daily AND 600mg PO QHS       hydrochlorothiazide (HYDRODIURIL) 25 MG tablet TAKE 1 TABLET BY MOUTH EVERY DAY DX HYPERTENSION AND LE EDEMA 31 tablet 11     levothyroxine (SYNTHROID/LEVOTHROID) 100 MCG tablet Take 1 tablet (100 mcg) by mouth daily       losartan (COZAAR) 50 MG tablet TAKE 1 TABLET BY MOUTH EVERY MORNING 31 tablet 11     oxyCODONE (ROXICODONE) 5 MG tablet TAKE 1 TABLET BY MOUTH EVERY 4 HOURS AS NEEDED FOR SEVERE PAIN 30 tablet 0     Polyethylene Glycol 3350 (MIRALAX PO) Take 1 capful by mouth daily as needed        polyethylene glycol-propylene glycol (SYSTANE ULTRA) 0.4-0.3 % SOLN ophthalmic solution Place 2 drops into both eyes 3 times daily (and additionally as needed/requested)       QUEtiapine (SEROQUEL) 50 MG tablet Take 0.5 tablets (25 mg) by mouth At Bedtime 100 tablet      senna-docusate  (SENOKOT-S/PERICOLACE) 8.6-50 MG tablet Take 1 tablet by mouth 2 times daily AND 1 tab BID PRN for constipation       VITAMIN D3 25 MCG (1000 UT) tablet TAKE 2 TABLETS (2000IU) BY MOUTH EVERY DAY DX OSTEOPOROSIS 60 tablet 11     Zinc Oxide 13 % CREA Apply topically daily        REVIEW OF SYSTEMS: Limited secondary to cognitive impairment but today pt reports the above and 4 point ROS including Respiratory, CV, GI and , other than that noted in the HPI,  is negative.    Objective:  Temp 98.4  F (36.9  C)   Wt 72.2 kg (159 lb 3.2 oz)   SpO2 96%   BMI 25.70 kg/m    GENERAL APPEARANCE: Alert, in no distress, cooperative.   EYES/ENT: EOM normal on camera, hearing acuity [place].  RESP: Respiratory effort appears unlabored over video screen, no respiratory distress apparent on video, On RA.   ABDOMEN: Appears non-distended, rounded.  SKIN: Skin appears baseline w/ video inspection. No wounds/rashes noted.    NEURO: CN II-XII at patient's baseline, MARMOLEJO at baseline on video. Sensation baseline PPS.  PSYCH: Insight, judgement, and memory are impaired at baseline, affect and mood are happy/engaged.    Laboratory/Diagnostics: Reviewed in Epic by provider today.     Assessment/Plan:  Multiple system atrophy P (H)  Lewy body dementia without behavioral disturbance (H)  Episode of recurrent major depressive disorder, unspecified depression episode severity (H)  VAMSHI (generalized anxiety disorder)  Primary osteoarthritis involving multiple joints  Chronic bilateral low back pain with bilateral sciatica  Slow transit constipation  Dizziness  Polypharmacy  Chronic. Ongoing. We coordinated care with neurology, who recommended reduction in Sinemet. This may assist in reported intermittent light-headedness and previously reported fatigue. We will keep pain and bowel regimen as-is this week, as Mehreen appears happy and comfortable. We will follow-up routinely or as needed.    Orders:  1. Decrease Sinemet to 25mg/100mg (1.5 tabs), PO  SANDRO. Dx: YANNICK    Video-Visit Details  Type of service:  Video Visit  Video Start Time: 1323  Video End Time (time video stopped): 1332  Originating Location (pt. Location): Assisted Living  Distant Location (provider location):  [facility] ASSISTED LIVING   Mode of Communication:  Video Conference.    Electronically signed by:  Dr. Lisa Parry, APRN CNP DNP

## 2020-11-18 NOTE — LETTER
"    11/18/2020        RE: Mehreen Camara  [facility]  [address]  [facility] [address]        [facility] Video Visit  Mehreen Camara is a 80 year old female who is being evaluated via a billable video visit due to the restrictions of the COVID19 pandemic.The patient has been notified of following: \"This video visit will be conducted via a call between you and your provider. We have found that certain health care needs can be provided without the need for an in-person physical exam.  This service lets us provide the care you need with a video conversation.  If a prescription is necessary we can send it directly to your pharmacy.  If lab work is needed we can place an order for that and you can then stop by our lab to have the test done at a later time. If during the course of the call the provider feels a video visit is not appropriate, you will not be charged for this service.\"     [employer] has received verbal consent for a Video Visit from the patient? Yes    Patient/facility would like the video invitation sent by: Send to e-mail at: [email or web address]    This visit took place virtually, with the patient located at [facility].  ________________________________________________  Video Start Time: 1323  Mehreen Camara complains of    Chief Complaint   Patient presents with     Video Visit     RECHECK   HPI/Subjective:  Mehreen seen on follow-up today after many happenings last week. We had adjusted her bowel medications, consulted PharmD and neurology. We also slightly increased her Gabapentin (which could help w/ pain/anxiety/sleep) at . We scheduled some topical Voltaren for joint relief. We received a recommendations from neurology in the meantime.     Today, Mehreen is having a very good day she says. She states her bowels have been better with the medication changes from last week. She states her pain is \"pretty good.\" She states intermittent " dizziness is a problem, but it is not bothering her today. She denies fever, SOB, headache, nausea. Chart review shows her VS have been stable, with some fluctuations in BP (which we have noted in the past). Nursing does not report new behaviors.    History: I have reviewed and updated the patient's Past Medical History, Social History, Family History and Medication List.  ALLERGIES: Penicillins, Aspirin, Atenolol, Atorvastatin, Bupropion, Clindamycin, Codeine, Ibuprofen, Lovastatin, and Cephalexin   Current Outpatient Medications   Medication Sig Dispense Refill     acetaminophen (TYLENOL) 500 MG tablet Take 1,000 mg by mouth 3 times daily       bisacodyl (DULCOLAX) 10 MG suppository Place 10 mg rectally daily as needed for constipation       carbidopa-levodopa (SINEMET)  MG tablet TAKE 2 TABLETS BY MOUTH THREE TIMES DAILY 180 tablet 11     clotrimazole (LOTRIMIN) 1 % external cream Apply topically 2 times daily as needed       diclofenac (VOLTAREN) 1 % topical gel Apply 2 g topically 2 times daily. May also apply 2 g 2 times daily as needed for moderate pain. 100 g 0     DULoxetine (CYMBALTA) 20 MG capsule Take 60 mg by mouth daily       gabapentin (NEURONTIN) 100 MG capsule Take 300 mg by mouth 2 times daily AND 600mg PO QHS       hydrochlorothiazide (HYDRODIURIL) 25 MG tablet TAKE 1 TABLET BY MOUTH EVERY DAY DX HYPERTENSION AND LE EDEMA 31 tablet 11     levothyroxine (SYNTHROID/LEVOTHROID) 100 MCG tablet Take 1 tablet (100 mcg) by mouth daily       losartan (COZAAR) 50 MG tablet TAKE 1 TABLET BY MOUTH EVERY MORNING 31 tablet 11     oxyCODONE (ROXICODONE) 5 MG tablet TAKE 1 TABLET BY MOUTH EVERY 4 HOURS AS NEEDED FOR SEVERE PAIN 30 tablet 0     Polyethylene Glycol 3350 (MIRALAX PO) Take 1 capful by mouth daily as needed        polyethylene glycol-propylene glycol (SYSTANE ULTRA) 0.4-0.3 % SOLN ophthalmic solution Place 2 drops into both eyes 3 times daily (and additionally as needed/requested)        QUEtiapine (SEROQUEL) 50 MG tablet Take 0.5 tablets (25 mg) by mouth At Bedtime 100 tablet      senna-docusate (SENOKOT-S/PERICOLACE) 8.6-50 MG tablet Take 1 tablet by mouth 2 times daily AND 1 tab BID PRN for constipation       VITAMIN D3 25 MCG (1000 UT) tablet TAKE 2 TABLETS (2000IU) BY MOUTH EVERY DAY DX OSTEOPOROSIS 60 tablet 11     Zinc Oxide 13 % CREA Apply topically daily        REVIEW OF SYSTEMS: Limited secondary to cognitive impairment but today pt reports the above and 4 point ROS including Respiratory, CV, GI and , other than that noted in the HPI,  is negative.    Objective:  Temp 98.4  F (36.9  C)   Wt 72.2 kg (159 lb 3.2 oz)   SpO2 96%   BMI 25.70 kg/m    GENERAL APPEARANCE: Alert, in no distress, cooperative.   EYES/ENT: EOM normal on camera, hearing acuity [place].  RESP: Respiratory effort appears unlabored over video screen, no respiratory distress apparent on video, On RA.   ABDOMEN: Appears non-distended, rounded.  SKIN: Skin appears baseline w/ video inspection. No wounds/rashes noted.    NEURO: CN II-XII at patient's baseline, MARMOLEJO at baseline on video. Sensation baseline PPS.  PSYCH: Insight, judgement, and memory are impaired at baseline, affect and mood are happy/engaged.    Laboratory/Diagnostics: Reviewed in Epic by provider today.     Assessment/Plan:  Multiple system atrophy P (H)  Lewy body dementia without behavioral disturbance (H)  Episode of recurrent major depressive disorder, unspecified depression episode severity (H)  VAMSHI (generalized anxiety disorder)  Primary osteoarthritis involving multiple joints  Chronic bilateral low back pain with bilateral sciatica  Slow transit constipation  Dizziness  Polypharmacy  Chronic. Ongoing. We coordinated care with neurology, who recommended reduction in Sinemet. This may assist in reported intermittent light-headedness and previously reported fatigue. We will keep pain and bowel regimen as-is this week, as Mehreen appears happy and  comfortable. We will follow-up routinely or as needed.    Orders:  1. Decrease Sinemet to 25mg/100mg (1.5 tabs), PO TID. Dx: YANNICK    Video-Visit Details  Type of service:  Video Visit  Video Start Time: 1323  Video End Time (time video stopped): 1332  Originating Location (pt. Location): Assisted Living  Distant Location (provider location):  [facility] ASSISTED LIVING   Mode of Communication:  Video Conference.    Electronically signed by:  HAILEE Gilbert CNP DNP            Sincerely,        HAILEE Langley CNP

## 2020-11-19 RX ORDER — CARBIDOPA AND LEVODOPA 25; 100 MG/1; MG/1
1.5 TABLET ORAL 3 TIMES DAILY
Qty: 180 TABLET | Refills: 11
Start: 2020-11-19 | End: 2020-11-26

## 2020-11-25 ENCOUNTER — VIRTUAL VISIT (OUTPATIENT)
Dept: GERIATRICS | Facility: CLINIC | Age: 80
End: 2020-11-25
Payer: COMMERCIAL

## 2020-11-25 VITALS — OXYGEN SATURATION: 97 % | TEMPERATURE: 98 F | BODY MASS INDEX: 25.7 KG/M2 | WEIGHT: 159.2 LBS

## 2020-11-25 DIAGNOSIS — F41.1 GAD (GENERALIZED ANXIETY DISORDER): ICD-10-CM

## 2020-11-25 DIAGNOSIS — R42 DIZZINESS: ICD-10-CM

## 2020-11-25 DIAGNOSIS — G90.3 MULTIPLE SYSTEM ATROPHY (H): ICD-10-CM

## 2020-11-25 DIAGNOSIS — M15.0 PRIMARY OSTEOARTHRITIS INVOLVING MULTIPLE JOINTS: ICD-10-CM

## 2020-11-25 DIAGNOSIS — Z79.899 POLYPHARMACY: ICD-10-CM

## 2020-11-25 DIAGNOSIS — F02.80 LEWY BODY DEMENTIA WITHOUT BEHAVIORAL DISTURBANCE (H): ICD-10-CM

## 2020-11-25 DIAGNOSIS — G89.29 CHRONIC BILATERAL LOW BACK PAIN WITH BILATERAL SCIATICA: ICD-10-CM

## 2020-11-25 DIAGNOSIS — G23.2 MULTIPLE SYSTEM ATROPHY P (H): Primary | ICD-10-CM

## 2020-11-25 DIAGNOSIS — G23.8 MULTIPLE SYSTEM ATROPHY (H): ICD-10-CM

## 2020-11-25 DIAGNOSIS — M54.41 CHRONIC BILATERAL LOW BACK PAIN WITH BILATERAL SCIATICA: ICD-10-CM

## 2020-11-25 DIAGNOSIS — K59.01 SLOW TRANSIT CONSTIPATION: ICD-10-CM

## 2020-11-25 DIAGNOSIS — M54.42 CHRONIC BILATERAL LOW BACK PAIN WITH BILATERAL SCIATICA: ICD-10-CM

## 2020-11-25 DIAGNOSIS — G31.83 LEWY BODY DEMENTIA WITHOUT BEHAVIORAL DISTURBANCE (H): ICD-10-CM

## 2020-11-25 DIAGNOSIS — F33.9 EPISODE OF RECURRENT MAJOR DEPRESSIVE DISORDER, UNSPECIFIED DEPRESSION EPISODE SEVERITY (H): ICD-10-CM

## 2020-11-25 RX ORDER — OXYCODONE HYDROCHLORIDE 5 MG/1
5 TABLET ORAL EVERY 4 HOURS PRN
Qty: 10 TABLET | Refills: 0 | Status: SHIPPED | OUTPATIENT
Start: 2020-11-25 | End: 2020-11-30

## 2020-11-25 NOTE — LETTER
"    11/25/2020        RE: Mehreen Camara  [facility]  [address]  [place] [facility] [address]        [facility] Geriatrics Video Visit  Mehreen Camara is a 80 year old female who is being evaluated via a billable video visit due to the restrictions of the COVID19 pandemic.The patient has been notified of following: \"This video visit will be conducted via a call between you and your provider. We have found that certain health care needs can be provided without the need for an in-person physical exam.  This service lets us provide the care you need with a video conversation.  If a prescription is necessary we can send it directly to your pharmacy.  If lab work is needed we can place an order for that and you can then stop by our lab to have the test done at a later time. If during the course of the call the provider feels a video visit is not appropriate, you will not be charged for this service.\"     Proivder has received verbal consent for a Video Visit from the patient? Yes    Patient/facility would like the video invitation sent by: Send to e-mail at: [email or web address]    This visit took place virtually, with the patient located at [facility].  ________________________________________________  Video Start Time: 1457  Mehreen Camara complains of    Chief Complaint   Patient presents with     Video Visit     Episodic   HPI/Subjective:  Mehreen seen on follow-up today after we started a Sinemet reduction last week. Nursing is requesting clarifications in Oxycodone frequency.      Today, Mehreen is having a very good day. She states her bowels have been better with the medication changes from last week, but sometimes she has bloating/cramping (which can be a side effect of Senna). She states her pain is \"pretty good.\" She states intermittent dizziness is a little better, and it is not bothering her today. She states that even though she doesn't usually use the " Oxycodone Q4h, that she would like to have it available in case it is needed more frequently. Again, we note her PRN use is about 2-4 doses/day. She denies fever, SOB, headache, nausea. Chart review shows her VS have been stable. Nursing does not report new behaviors.    History: I have reviewed and updated the patient's Past Medical History, Social History, Family History and Medication List.  ALLERGIES: Penicillins, Aspirin, Atenolol, Atorvastatin, Bupropion, Clindamycin, Codeine, Ibuprofen, Lovastatin, and Cephalexin   Current Outpatient Medications   Medication Sig Dispense Refill     oxyCODONE (ROXICODONE) 5 MG tablet Take 1 tablet (5 mg) by mouth every 4 hours as needed for severe pain 10 tablet 0     acetaminophen (TYLENOL) 500 MG tablet Take 1,000 mg by mouth 3 times daily       bisacodyl (DULCOLAX) 10 MG suppository Place 10 mg rectally daily as needed for constipation       carbidopa-levodopa (SINEMET)  MG tablet Take 1.5 tablets by mouth 3 times daily 180 tablet 11     clotrimazole (LOTRIMIN) 1 % external cream Apply topically 2 times daily as needed       diclofenac (VOLTAREN) 1 % topical gel Apply 2 g topically 2 times daily. May also apply 2 g 2 times daily as needed for moderate pain. 100 g 0     DULoxetine (CYMBALTA) 20 MG capsule Take 60 mg by mouth daily       gabapentin (NEURONTIN) 100 MG capsule Take 300 mg by mouth 2 times daily AND 600mg PO QHS       hydrochlorothiazide (HYDRODIURIL) 25 MG tablet TAKE 1 TABLET BY MOUTH EVERY DAY DX HYPERTENSION AND LE EDEMA 31 tablet 11     levothyroxine (SYNTHROID/LEVOTHROID) 100 MCG tablet Take 1 tablet (100 mcg) by mouth daily       losartan (COZAAR) 50 MG tablet TAKE 1 TABLET BY MOUTH EVERY MORNING 31 tablet 11     Polyethylene Glycol 3350 (MIRALAX PO) Take 1 capful by mouth daily as needed        polyethylene glycol-propylene glycol (SYSTANE ULTRA) 0.4-0.3 % SOLN ophthalmic solution Place 2 drops into both eyes 3 times daily (and additionally as  needed/requested)       QUEtiapine (SEROQUEL) 50 MG tablet Take 0.5 tablets (25 mg) by mouth At Bedtime 100 tablet      senna-docusate (SENOKOT-S/PERICOLACE) 8.6-50 MG tablet Take 1 tablet by mouth 2 times daily AND 1 tab BID PRN for constipation       VITAMIN D3 25 MCG (1000 UT) tablet TAKE 2 TABLETS (2000IU) BY MOUTH EVERY DAY DX OSTEOPOROSIS 60 tablet 11     Zinc Oxide 13 % CREA Apply topically daily        REVIEW OF SYSTEMS: Limited secondary to cognitive impairment but today pt reports the above and 4 point ROS including Respiratory, CV, GI and , other than that noted in the HPI,  is negative.    Objective:  Temp 98  F (36.7  C)   Wt 72.2 kg (159 lb 3.2 oz)   SpO2 97%   BMI 25.70 kg/m    GENERAL APPEARANCE: Alert, in no distress, cooperative.   EYES/ENT: EOM normal on camera, hearing acuity [place].  RESP: Respiratory effort appears unlabored over video screen, no respiratory distress apparent on video, On RA.   ABDOMEN: Appears non-distended, rounded.  SKIN: Skin appears baseline w/ video inspection. No wounds/rashes noted.    NEURO: CN II-XII at patient's baseline, MARMOLEJO at baseline on video. Sensation baseline PPS.  PSYCH: Insight, judgement, and memory are impaired at baseline, affect and mood are happy/engaged.    Laboratory/Diagnostics: Reviewed in Epic by provider today.     Assessment/Plan:  Multiple system atrophy P (H)  Lewy body dementia without behavioral disturbance (H)  Episode of recurrent major depressive disorder, unspecified depression episode severity (H)  VAMSHI (generalized anxiety disorder)  Primary osteoarthritis involving multiple joints  Chronic bilateral low back pain with bilateral sciatica  Slow transit constipation  Dizziness  Polypharmacy  Chronic. Ongoing. We coordinated care with neurology, who recommended reduction in Sinemet during last visit. Mehreen has tolerated this well without any problems with fatigue and overall ability to function/mobilize. We will try another reduction. We  will clarify Oxycodone prescription for pharmacy. We will keep bowel regimen as-is this week, as Mehreen appears comfortable. We will follow-up routinely or as needed.    Orders:  1. Decrease Sinemet to 25mg/100mg to 1 tabs PO TID. Dx: MSA.  2. Oxycodone to stay Q4h PRN (Script updated).     Video-Visit Details  Type of service:  Video Visit  Video Start Time: 1457  Video End Time (time video stopped): 1505  Originating Location (pt. Location): Assisted Living  Distant Location (provider location):  [facility] ASSISTED LIV[facility]   Mode of Communication:  Video Conference.    Electronically signed by:  HAILEE Gilbert CNP DNP          Sincerely,        HAILEE Langley CNP

## 2020-11-26 RX ORDER — CARBIDOPA AND LEVODOPA 25; 100 MG/1; MG/1
1 TABLET ORAL 3 TIMES DAILY
Qty: 180 TABLET | Refills: 11
Start: 2020-11-26 | End: 2022-02-27

## 2020-11-26 NOTE — PROGRESS NOTES
"[facility] Video Visit  Mehreen Camara is a 80 year old female who is being evaluated via a billable video visit due to the restrictions of the COVID19 pandemic.The patient has been notified of following: \"This video visit will be conducted via a call between you and your provider. We have found that certain health care needs can be provided without the need for an in-person physical exam.  This service lets us provide the care you need with a video conversation.  If a prescription is necessary we can send it directly to your pharmacy.  If lab work is needed we can place an order for that and you can then stop by our lab to have the test done at a later time. If during the course of the call the provider feels a video visit is not appropriate, you will not be charged for this service.\"     [facility] has received verbal consent for a Video Visit from the patient? Yes    Patient/facility would like the video invitation sent by: Send to e-mail at: [email or web address]    This visit took place virtually, with the patient located at [facility].  ________________________________________________  Video Start Time: 1457  Mehreen Camara complains of    Chief Complaint   Patient presents with     Video Visit     Episodic   HPI/Subjective:  Mehreen seen on follow-up today after we started a Sinemet reduction last week. Nursing is requesting clarifications in Oxycodone frequency.      Today, Mehreen is having a very good day. She states her bowels have been better with the medication changes from last week, but sometimes she has bloating/cramping (which can be a side effect of Senna). She states her pain is \"pretty good.\" She states intermittent dizziness is a little better, and it is not bothering her today. She states that even though she doesn't usually use the Oxycodone Q4h, that she would like to have it available in case it is needed more frequently. Again, we note her PRN use is " about 2-4 doses/day. She denies fever, SOB, headache, nausea. Chart review shows her VS have been stable. Nursing does not report new behaviors.    History: I have reviewed and updated the patient's Past Medical History, Social History, Family History and Medication List.  ALLERGIES: Penicillins, Aspirin, Atenolol, Atorvastatin, Bupropion, Clindamycin, Codeine, Ibuprofen, Lovastatin, and Cephalexin   Current Outpatient Medications   Medication Sig Dispense Refill     oxyCODONE (ROXICODONE) 5 MG tablet Take 1 tablet (5 mg) by mouth every 4 hours as needed for severe pain 10 tablet 0     acetaminophen (TYLENOL) 500 MG tablet Take 1,000 mg by mouth 3 times daily       bisacodyl (DULCOLAX) 10 MG suppository Place 10 mg rectally daily as needed for constipation       carbidopa-levodopa (SINEMET)  MG tablet Take 1.5 tablets by mouth 3 times daily 180 tablet 11     clotrimazole (LOTRIMIN) 1 % external cream Apply topically 2 times daily as needed       diclofenac (VOLTAREN) 1 % topical gel Apply 2 g topically 2 times daily. May also apply 2 g 2 times daily as needed for moderate pain. 100 g 0     DULoxetine (CYMBALTA) 20 MG capsule Take 60 mg by mouth daily       gabapentin (NEURONTIN) 100 MG capsule Take 300 mg by mouth 2 times daily AND 600mg PO QHS       hydrochlorothiazide (HYDRODIURIL) 25 MG tablet TAKE 1 TABLET BY MOUTH EVERY DAY DX HYPERTENSION AND LE EDEMA 31 tablet 11     levothyroxine (SYNTHROID/LEVOTHROID) 100 MCG tablet Take 1 tablet (100 mcg) by mouth daily       losartan (COZAAR) 50 MG tablet TAKE 1 TABLET BY MOUTH EVERY MORNING 31 tablet 11     Polyethylene Glycol 3350 (MIRALAX PO) Take 1 capful by mouth daily as needed        polyethylene glycol-propylene glycol (SYSTANE ULTRA) 0.4-0.3 % SOLN ophthalmic solution Place 2 drops into both eyes 3 times daily (and additionally as needed/requested)       QUEtiapine (SEROQUEL) 50 MG tablet Take 0.5 tablets (25 mg) by mouth At Bedtime 100 tablet       senna-docusate (SENOKOT-S/PERICOLACE) 8.6-50 MG tablet Take 1 tablet by mouth 2 times daily AND 1 tab BID PRN for constipation       VITAMIN D3 25 MCG (1000 UT) tablet TAKE 2 TABLETS (2000IU) BY MOUTH EVERY DAY DX OSTEOPOROSIS 60 tablet 11     Zinc Oxide 13 % CREA Apply topically daily        REVIEW OF SYSTEMS: Limited secondary to cognitive impairment but today pt reports the above and 4 point ROS including Respiratory, CV, GI and , other than that noted in the HPI,  is negative.    Objective:  Temp 98  F (36.7  C)   Wt 72.2 kg (159 lb 3.2 oz)   SpO2 97%   BMI 25.70 kg/m    GENERAL APPEARANCE: Alert, in no distress, cooperative.   EYES/ENT: EOM normal on camera, hearing acuity [place].  RESP: Respiratory effort appears unlabored over video screen, no respiratory distress apparent on video, On RA.   ABDOMEN: Appears non-distended, rounded.  SKIN: Skin appears baseline w/ video inspection. No wounds/rashes noted.    NEURO: CN II-XII at patient's baseline, MARMOLEJO at baseline on video. Sensation baseline PPS.  PSYCH: Insight, judgement, and memory are impaired at baseline, affect and mood are happy/engaged.    Laboratory/Diagnostics: Reviewed in Epic by provider today.     Assessment/Plan:  Multiple system atrophy P (H)  Lewy body dementia without behavioral disturbance (H)  Episode of recurrent major depressive disorder, unspecified depression episode severity (H)  VAMSHI (generalized anxiety disorder)  Primary osteoarthritis involving multiple joints  Chronic bilateral low back pain with bilateral sciatica  Slow transit constipation  Dizziness  Polypharmacy  Chronic. Ongoing. We coordinated care with neurology, who recommended reduction in Sinemet during last visit. Mehreen has tolerated this well without any problems with fatigue and overall ability to function/mobilize. We will try another reduction. We will clarify Oxycodone prescription for pharmacy. We will keep bowel regimen as-is this week, as Mehreen appears  comfortable. We will follow-up routinely or as needed.    Orders:  1. Decrease Sinemet to 25mg/100mg to 1 tabs PO TID. Dx: MSA.  2. Oxycodone to stay Q4h PRN (Script updated).     Video-Visit Details  Type of service:  Video Visit  Video Start Time: 1457  Video End Time (time video stopped): 1505  Originating Location (pt. Location): Assisted Living  Distant Location (provider location):  [facility]   Mode of Communication:  Video Conference.    Electronically signed by:  Dr. Lisa Parry, APRN CNP DNP

## 2020-11-30 DIAGNOSIS — M15.0 PRIMARY OSTEOARTHRITIS INVOLVING MULTIPLE JOINTS: Primary | ICD-10-CM

## 2020-11-30 RX ORDER — OXYCODONE HYDROCHLORIDE 5 MG/1
TABLET ORAL
Qty: 10 TABLET | Refills: 0 | Status: SHIPPED | OUTPATIENT
Start: 2020-11-30 | End: 2020-12-03

## 2020-12-03 DIAGNOSIS — M15.0 PRIMARY OSTEOARTHRITIS INVOLVING MULTIPLE JOINTS: ICD-10-CM

## 2020-12-03 RX ORDER — OXYCODONE HYDROCHLORIDE 5 MG/1
TABLET ORAL
Qty: 10 TABLET | Refills: 0 | Status: SHIPPED | OUTPATIENT
Start: 2020-12-03 | End: 2020-12-07

## 2020-12-07 DIAGNOSIS — M15.0 PRIMARY OSTEOARTHRITIS INVOLVING MULTIPLE JOINTS: ICD-10-CM

## 2020-12-07 RX ORDER — OXYCODONE HYDROCHLORIDE 5 MG/1
TABLET ORAL
Qty: 10 TABLET | Refills: 0 | Status: SHIPPED | OUTPATIENT
Start: 2020-12-07 | End: 2020-12-10

## 2020-12-08 ENCOUNTER — TELEPHONE (OUTPATIENT)
Dept: NEUROLOGY | Facility: CLINIC | Age: 80
End: 2020-12-08

## 2020-12-08 ENCOUNTER — VIRTUAL VISIT (OUTPATIENT)
Dept: PHARMACY | Facility: CLINIC | Age: 80
End: 2020-12-08
Payer: MEDICAID

## 2020-12-08 DIAGNOSIS — F03.90 DEMENTIA WITHOUT BEHAVIORAL DISTURBANCE, UNSPECIFIED DEMENTIA TYPE: ICD-10-CM

## 2020-12-08 DIAGNOSIS — G90.3 MULTIPLE SYSTEM ATROPHY (H): Primary | ICD-10-CM

## 2020-12-08 DIAGNOSIS — G23.8 MULTIPLE SYSTEM ATROPHY (H): Primary | ICD-10-CM

## 2020-12-08 DIAGNOSIS — R52 PAIN: ICD-10-CM

## 2020-12-08 DIAGNOSIS — K58.9 IRRITABLE BOWEL SYNDROME, UNSPECIFIED TYPE: ICD-10-CM

## 2020-12-08 DIAGNOSIS — K59.01 SLOW TRANSIT CONSTIPATION: ICD-10-CM

## 2020-12-08 DIAGNOSIS — F29 PSYCHOSIS, UNSPECIFIED PSYCHOSIS TYPE (H): ICD-10-CM

## 2020-12-08 PROCEDURE — 99606 MTMS BY PHARM EST 15 MIN: CPT | Mod: TEL | Performed by: PHARMACIST

## 2020-12-08 PROCEDURE — 99607 MTMS BY PHARM ADDL 15 MIN: CPT | Mod: TEL | Performed by: PHARMACIST

## 2020-12-08 RX ORDER — QUETIAPINE FUMARATE 25 MG/1
TABLET, FILM COATED ORAL
Status: ON HOLD | COMMUNITY
End: 2020-12-29

## 2020-12-08 NOTE — Clinical Note
Sterling Gonzalez! I had a good visit with Eladio and Irasema. It seems there were less concerns/complaints when both daughters were present on the phone and overall I don't have too much to report. Please see my plan for a few requests from daughters for your next visit with Mehreen. I think the pain management plan is still appropriate but Eladio brought up some concerns and I advised that Mehreen talk with you directly about her pain if it is an issue (rather than Eladio bringing issues to me). Let me know if you have any questions! I will plan to follow up with the family PRN at this point. Have a great holiday season! -- Arianne

## 2020-12-08 NOTE — TELEPHONE ENCOUNTER
[facility] Call Center    Phone Message    May a detailed message be left on voicemail: yes     Reason for Call: Other: Eladio returning call. Eladio would like to be in pt's video apt. Please call her back to confirm and give information on how to join. (no video apt available for 12/15)      Action Taken: Message routed to:  [facility] ([facility]): joe neuro     Travel Screening: Not Applicable

## 2020-12-08 NOTE — PROGRESS NOTES
"MTM ENCOUNTER  SUBJECTIVE/OBJECTIVE:                           Mehreen Camara is a 80 year old female called for a follow-up visit. She was referred to me from Dr. Monterroso. Visit today was conducted with daughters, Eladio and Irasema, due to patient's cognitive impairment and challenges with virtual care in the midst of the pandemic. Today's visit is a follow-up MTM visit from 11/3/20     Reason for visit: follow up on medications.    Allergies/ADRs: Reviewed in chart  Tobacco: She reports that she quit smoking about 26 years ago. Her smoking use included cigarettes. She smoked 0.50 packs per day. She has never used smokeless tobacco.  Alcohol: none  Caffeine: not discussed  Activity: minimal  Past Medical History: Reviewed in chart  Social: Patient currently living at [facility]; PCP is Dr. Parry    Medication Adherence/Access: no issues reported - medications administered by [facility]    MSA: Currently taking carbidopa-levodopa  mg 1 tablets 3 times/day at 9 am, 12-12:30 pm, and 6:30 pm. Dr. Parry has been working on decreasing the dose of this medication to reduce possibly contributing to light-headedness/dizziness. Eladio notes that patient still seems \"weak and lethargic\" in the mornings but this seems to improve throughout the day. Eladio has observed that the patient is \"talkative and energetic\" by mid-day. No other concerns noted today regarding motor symptoms. Eladio did mention that she clipped the patient's toenails recently and they are thick; she is wondering if her toenails are rubbing on her foot causing some irritation or if she has an infection?    Dementia/psychosis: Currently taking quetiapine 25 mg nightly at bedtime. There is a PRN order of quetiapine aswell but patient has to request a dose and thus she rarely, if ever, has taken it. Eladio brings up concern again today that [facility] is giving her quetiapine scheduled in the mornings but this is not actually happening. No concerns regarding " "psychosis today.     Constipation/IBS: Currently taking senna-S 8.6-50 mg twice daily (scheduled) and as needed. She is able to keep Miralax in her apartment and can self-administer. Eladio shares that the last time she took the patient out for an appointment she urgently had a bowel movement but she did make it to the bathroom. Eladio states that Desitin order is \"on hold\" and wondering if it can be re-instated. Eladio has noted that she has some skin irritation and she would like it available to her in the patient's home.     Chronic pain: Currently taking duloxetine 60 mg daily, gabapentin 300 mg AM/300 mg noon/600 mg bedtime, acetaminophen 1000 mg 3 times/day, Voltaren gel, and oxycodone 5 mg every 4 hours as needed (patient is typically asking for it 1-3 times/day). Today, Eladio asks if the oxycodone could be scheduled because she is concerned that the patient forgets to ask for it. Eladio describes that the patient is in pain but she has brought up this concern to her provider.     Today's Vitals: There were no vitals taken for this visit.    ASSESSMENT:                            Medication Adherence: No issues identified    MSA: stable; lower dose of carbidopa-levodopa seems to be going well for the patient. I will recommend that Dr. Parry re-instate the Desitin order and take a look at the patient's foot at next visit.     Dementia/psychosis: improved.     Constipation/IBS: improved.     Chronic pain: Reviewed risks of scheduled opioids such as increased confusion, somnolence, and constipation. If patient does not have controlled pain due to forgetting to ask for doses, I advised that patient bring up her concern regarding pain with Dr. Parry at next visit.     PLAN:                            1. I will see if Dr. Parry can re-instate the Desitin order and allow Mehreen to self-administer  2. I will see if Dr. Parry can take a look at Mehreen's foot and see if it is infected or if the toenail was causing " irritation  3. If Mehreen has concerns regarding pain, she should discuss this with Dr. Parry    I spent 43 minutes with this patient today. I offer these suggestions for consideration by Dr. Parry/Dr. Monterroso. A copy of the visit note was provided to the patient's primary care and referring providers    Will follow up as needed.    The patient was sent via [employer] a summary of these recommendations.     Arianne Melendrez, Pharm.D.  Medication Therapy Management Pharmacist  Phone: [phone]    Patient consented to a telehealth visit: yes  Telemedicine Visit Details  Type of service:  Telephone visit  Start Time: 2:35 PM  End Time: 3:18 PM  Originating Location (patient location): [place]  Distant Location (provider location):  [facility]  Mode of Communication:  Telephone      Medication Therapy Recommendations Needing Review  No medication therapy recommendations to display

## 2020-12-13 NOTE — PATIENT INSTRUCTIONS
Recommendations from today's MTM visit:                                                      1. I will see if Dr. Parry can re-instate the Desitin order and allow Mehreen to self-administer  2. I will see if Dr. Parry can take a look at Mehreen's foot and see if it is infected or if the toenail was causing irritation  3. If Mehreen has concerns regarding pain, she should discuss this with Dr. Parry    It was great to speak with you today.  I value your experience and would be very thankful for your time with providing feedback on our clinic survey. You may receive a survey via email or text message in the next few days.     Next MTM visit: as needed    To schedule another MTM appointment, please call the clinic directly or you may call the MTM scheduling line at [phone] or toll-free at [phone].     My Clinical Pharmacist's contact information:                                                      It was a pleasure talking with you today!  Please feel free to contact me with any questions or concerns you have.      Arianne Melendrez, Pharm.D.  Medication Therapy Management Pharmacist  Phone: [phone]

## 2020-12-14 ENCOUNTER — AMBULATORY - HEALTHEAST (OUTPATIENT)
Dept: GERIATRICS | Facility: CLINIC | Age: 80
End: 2020-12-14

## 2020-12-14 ENCOUNTER — OFFICE VISIT - HEALTHEAST (OUTPATIENT)
Dept: GERIATRICS | Facility: CLINIC | Age: 80
End: 2020-12-14

## 2020-12-14 DIAGNOSIS — M15.0 PRIMARY OSTEOARTHRITIS INVOLVING MULTIPLE JOINTS: ICD-10-CM

## 2020-12-14 DIAGNOSIS — Z78.9 LIVES IN ASSISTED LIVING FACILITY: ICD-10-CM

## 2020-12-14 DIAGNOSIS — R41.89 COGNITIVE AND BEHAVIORAL CHANGES: ICD-10-CM

## 2020-12-14 DIAGNOSIS — U07.1 INFECTION DUE TO 2019 NOVEL CORONAVIRUS: ICD-10-CM

## 2020-12-14 DIAGNOSIS — I10 ESSENTIAL HYPERTENSION: ICD-10-CM

## 2020-12-14 DIAGNOSIS — R46.89 COGNITIVE AND BEHAVIORAL CHANGES: ICD-10-CM

## 2020-12-14 DIAGNOSIS — G23.2 MULTIPLE SYSTEM ATROPHY P (H): ICD-10-CM

## 2020-12-14 DIAGNOSIS — G25.9 MOVEMENT DISORDER: ICD-10-CM

## 2020-12-21 RX ORDER — OXYCODONE HYDROCHLORIDE 5 MG/1
TABLET ORAL
Qty: 60 TABLET | Refills: 0 | Status: ON HOLD | OUTPATIENT
Start: 2020-12-21 | End: 2020-12-29

## 2020-12-22 ENCOUNTER — AMBULATORY - HEALTHEAST (OUTPATIENT)
Dept: GERIATRICS | Facility: CLINIC | Age: 80
End: 2020-12-22

## 2020-12-23 ENCOUNTER — HOSPITAL ENCOUNTER (EMERGENCY)
Facility: CLINIC | Age: 80
Discharge: ANOTHER HOSPITAL | End: 2020-12-23
Attending: EMERGENCY MEDICINE | Admitting: EMERGENCY MEDICINE
Payer: COMMERCIAL

## 2020-12-23 ENCOUNTER — HOSPITAL ENCOUNTER (INPATIENT)
Facility: CLINIC | Age: 80
LOS: 7 days | Discharge: NURSING, REHAB OR LONG-TERM CARE | DRG: 536 | End: 2020-12-30
Attending: EMERGENCY MEDICINE | Admitting: SURGERY
Payer: COMMERCIAL

## 2020-12-23 ENCOUNTER — APPOINTMENT (OUTPATIENT)
Dept: CT IMAGING | Facility: CLINIC | Age: 80
End: 2020-12-23
Attending: EMERGENCY MEDICINE
Payer: COMMERCIAL

## 2020-12-23 VITALS
RESPIRATION RATE: 20 BRPM | DIASTOLIC BLOOD PRESSURE: 93 MMHG | BODY MASS INDEX: 24.11 KG/M2 | HEIGHT: 66 IN | HEART RATE: 86 BPM | OXYGEN SATURATION: 100 % | WEIGHT: 150 LBS | TEMPERATURE: 97.6 F | SYSTOLIC BLOOD PRESSURE: 128 MMHG

## 2020-12-23 DIAGNOSIS — S32.10XA CLOSED FRACTURE OF SACRUM, UNSPECIFIED PORTION OF SACRUM, INITIAL ENCOUNTER (H): ICD-10-CM

## 2020-12-23 DIAGNOSIS — S32.591A CLOSED FRACTURE OF RAMUS OF RIGHT PUBIS, INITIAL ENCOUNTER (H): ICD-10-CM

## 2020-12-23 DIAGNOSIS — R53.1 WEAKNESS: ICD-10-CM
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
## 2020-12-23 DIAGNOSIS — S32.82XA MULTIPLE CLOSED FRACTURES OF PELVIS WITHOUT DISRUPTION OF PELVIC RING, INITIAL ENCOUNTER (H): ICD-10-CM
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
## 2020-12-23 DIAGNOSIS — F40.9 INSOMNIA DUE TO ANXIETY AND FEAR: Primary | ICD-10-CM

## 2020-12-23 DIAGNOSIS — F51.05 INSOMNIA DUE TO ANXIETY AND FEAR: Primary | ICD-10-CM

## 2020-12-23 LAB
LABORATORY COMMENT REPORT: NORMAL
SARS-COV-2 RNA SPEC QL NAA+PROBE: NEGATIVE
SPECIMEN SOURCE: NORMAL

## 2020-12-23 PROCEDURE — 250N000011 HC RX IP 250 OP 636: Performed by: EMERGENCY MEDICINE

## 2020-12-23 PROCEDURE — 70450 CT HEAD/BRAIN W/O DYE: CPT

## 2020-12-23 PROCEDURE — 87635 SARS-COV-2 COVID-19 AMP PRB: CPT | Performed by: EMERGENCY MEDICINE

## 2020-12-23 PROCEDURE — 250N000011 HC RX IP 250 OP 636: Performed by: FAMILY MEDICINE

## 2020-12-23 PROCEDURE — 99285 EMERGENCY DEPT VISIT HI MDM: CPT | Performed by: EMERGENCY MEDICINE

## 2020-12-23 PROCEDURE — 99285 EMERGENCY DEPT VISIT HI MDM: CPT | Mod: 25 | Performed by: EMERGENCY MEDICINE

## 2020-12-23 PROCEDURE — 96375 TX/PRO/DX INJ NEW DRUG ADDON: CPT | Performed by: EMERGENCY MEDICINE

## 2020-12-23 PROCEDURE — C9803 HOPD COVID-19 SPEC COLLECT: HCPCS | Performed by: EMERGENCY MEDICINE

## 2020-12-23 PROCEDURE — 72192 CT PELVIS W/O DYE: CPT

## 2020-12-23 PROCEDURE — 250N000013 HC RX MED GY IP 250 OP 250 PS 637: Performed by: EMERGENCY MEDICINE

## 2020-12-23 PROCEDURE — 96374 THER/PROPH/DIAG INJ IV PUSH: CPT | Performed by: EMERGENCY MEDICINE

## 2020-12-23 PROCEDURE — 72131 CT LUMBAR SPINE W/O DYE: CPT

## 2020-12-23 PROCEDURE — 72128 CT CHEST SPINE W/O DYE: CPT

## 2020-12-23 PROCEDURE — 72125 CT NECK SPINE W/O DYE: CPT

## 2020-12-23 PROCEDURE — 120N000003 HC R&B IMCU UMMC

## 2020-12-23 RX ORDER — KETOROLAC TROMETHAMINE 15 MG/ML
15 INJECTION, SOLUTION INTRAMUSCULAR; INTRAVENOUS ONCE
Status: COMPLETED | OUTPATIENT
Start: 2020-12-23 | End: 2020-12-23

## 2020-12-23 RX ORDER — HYDROMORPHONE HYDROCHLORIDE 1 MG/ML
0.5 INJECTION, SOLUTION INTRAMUSCULAR; INTRAVENOUS; SUBCUTANEOUS
Status: DISCONTINUED | OUTPATIENT
Start: 2020-12-23 | End: 2020-12-23 | Stop reason: HOSPADM

## 2020-12-23 RX ORDER — OXYCODONE HYDROCHLORIDE 5 MG/1
10 TABLET ORAL ONCE
Status: COMPLETED | OUTPATIENT
Start: 2020-12-23 | End: 2020-12-23

## 2020-12-23 RX ORDER — MORPHINE SULFATE 4 MG/ML
4 INJECTION, SOLUTION INTRAMUSCULAR; INTRAVENOUS ONCE
Status: COMPLETED | OUTPATIENT
Start: 2020-12-23 | End: 2020-12-23

## 2020-12-23 RX ORDER — HYDROMORPHONE HYDROCHLORIDE 1 MG/ML
0.5 INJECTION, SOLUTION INTRAMUSCULAR; INTRAVENOUS; SUBCUTANEOUS
Status: DISCONTINUED | OUTPATIENT
Start: 2020-12-23 | End: 2020-12-24 | Stop reason: DRUGHIGH

## 2020-12-23 RX ADMIN — KETOROLAC TROMETHAMINE 15 MG: 15 INJECTION, SOLUTION INTRAMUSCULAR; INTRAVENOUS at 15:34

## 2020-12-23 RX ADMIN — HYDROMORPHONE HYDROCHLORIDE 0.5 MG: 1 INJECTION, SOLUTION INTRAMUSCULAR; INTRAVENOUS; SUBCUTANEOUS at 20:34

## 2020-12-23 RX ADMIN — HYDROMORPHONE HYDROCHLORIDE 0.5 MG: 1 INJECTION, SOLUTION INTRAMUSCULAR; INTRAVENOUS; SUBCUTANEOUS at 22:50

## 2020-12-23 RX ADMIN — OXYCODONE HYDROCHLORIDE 10 MG: 5 TABLET ORAL at 19:10

## 2020-12-23 RX ADMIN — HYDROMORPHONE HYDROCHLORIDE 0.5 MG: 1 INJECTION, SOLUTION INTRAMUSCULAR; INTRAVENOUS; SUBCUTANEOUS at 20:53

## 2020-12-23 RX ADMIN — MORPHINE SULFATE 4 MG: 4 INJECTION, SOLUTION INTRAMUSCULAR; INTRAVENOUS at 15:35

## 2020-12-23 ASSESSMENT — MIFFLIN-ST. JEOR: SCORE: 1167.15

## 2020-12-23 NOTE — ED PROVIDER NOTES
History     Chief Complaint   Patient presents with     Fall     complaining of low back and bilateral hip pain.  EMS gasve 100mics fentanyl.     HPI  Mehreen Camara is a 80 year old female with a history of disautonomic syndrome and frequent falls who presents for evaluation after a fall.  The patient states that she was walking at home when she clipped her leg on a chair and fell to the ground.  She landed on her right hip.  She is not sure if she hit her head and denies loss of consciousness.  She is not sure if she has headache or not.  She is complaining of neck pain, says she has had prior surgery on her neck.  She also is complaining of back pain and reports multiple prior compression fractures.  She says she has not been able to get up and walk and is complaining of bilateral hip pain.  EMS gave fentanyl in route.  She rates her pain is severe.  No nausea, vomiting, chest pain, difficulty breathing, abdominal pain.    Allergies:  Allergies   Allergen Reactions     Penicillins Anaphylaxis, Rash and Shortness Of Breath     Aspirin Nausea and GI Disturbance     Atenolol Cough     Atorvastatin Muscle Pain (Myalgia) and Nausea and Vomiting     Bupropion Nausea     Clindamycin Other (See Comments)     Constipation      Codeine Nausea     Ibuprofen Nausea     Stomach upset     Lovastatin Muscle Pain (Myalgia)     Cephalexin Rash     Neck reddness       Problem List:    Patient Active Problem List    Diagnosis Date Noted     Compression fracture of L4 lumbar vertebra, closed, initial encounter (H) 10/11/2020     Priority: Medium     Compression fracture of fourth lumbar vertebra (H) 10/10/2020     Priority: Medium     Compression fracture of L4 vertebra, initial encounter (H) 10/10/2020     Priority: Medium     Fall 08/05/2020     Priority: Medium     Bilateral knee pain 08/05/2020     Priority: Medium     Generalized muscle weakness 08/05/2020     Priority: Medium     Depression 08/05/2020     Priority:  Medium     REM sleep behavior disorder 08/05/2020     Priority: Medium     Rupture of left Achilles tendon, sequela 12/31/2019     Priority: Medium     Abdominal pain, generalized 12/31/2019     Priority: Medium     Flatulence, eructation, and gas pain 08/28/2019     Priority: Medium     Post-op pain 12/12/2018     Priority: Medium     S/P laparoscopic cholecystectomy 12/12/2018     Priority: Medium     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     Priority: Medium     Movement disorder 09/19/2018     Priority: Medium     Rheumatic aortic stenosis 09/19/2018     Priority: Medium     Disorder of bursae and tendons in shoulder region 09/18/2018     Priority: Medium     Overview:   Created by Conversion    Replacement Utility updated for latest IMO load       Adjustment disorder with anxious mood 09/17/2018     Priority: Medium     Adjustment disorder with mixed anxiety and depressed mood 09/17/2018     Priority: Medium     Aneurysm of abdominal vessel (H) 09/17/2018     Priority: Medium     Overview:   Created by Conversion  [facility] Annotation: Jun 8 2011  8:07AM - Danni Ortiz: 4.4 on 11/2013.    Needs 6-12 month u/s or CT.    Replacement Utility updated for latest IMO load       Harris's esophagus 09/17/2018     Priority: Medium     Overview:   Created by Conversion  [facility] Annotation: Feb  3 2012  8:32AM - Cameron Obando: Needs EGD 2/2017       Bradycardia 09/17/2018     Priority: Medium     Cataract 09/17/2018     Priority: Medium     Overview:   Created by Conversion       Major depressive disorder, recurrent episode (H) 09/17/2018     Priority: Medium     Overview:   Created by Conversion    Replacement Utility updated for latest IMO load       Constipation 09/17/2018     Priority: Medium     Dizziness 09/17/2018     Priority: Medium     Overview:   Created by Conversion       Dyslipidemia 09/17/2018     Priority: Medium     Overview:   Created by Conversion       Esophageal reflux 09/17/2018      Priority: Medium     Overview:   Created by Conversion       Hypertension 09/17/2018     Priority: Medium     Overview:   Created by Conversion    Replacement Utility updated for latest IMO load       Hypothyroidism 09/17/2018     Priority: Medium     Overview:   Created by Conversion    Replacement Utility updated for latest IMO load       Insomnia due to anxiety and fear 09/17/2018     Priority: Medium     Lower back pain 09/17/2018     Priority: Medium     Osteoarthrosis 09/17/2018     Priority: Medium     Overview:   Created by Conversion    Replacement Utility updated for latest IMO load       Disorder of bone and cartilage 09/17/2018     Priority: Medium     Overview:   Created by Conversion  [facility] Annotation: Dec 13 2012  7:41Roberta Kelly: vit D/Ca, f/u   Dexa needed 1/2014.    Replacement Utility updated for latest IMO load       Lower extremity weakness 07/04/2018     Priority: Medium     Orthostatic hypotension dysautonomic syndrome (H) 09/22/2017     Priority: Medium     Primary insomnia 07/04/2017     Priority: Medium     Urinary incontinence in female 05/07/2017     Priority: Medium     Weakness 05/04/2017     Priority: Medium     S/P AAA repair using bifurcation graft 11/30/2015     Priority: Medium     Adrenal adenoma 11/20/2015     Priority: Medium     Overview:   Current hormonal evaluation through endocrinology          Past Medical History:    Past Medical History:   Diagnosis Date     Adrenal adenoma      Arthritis      Depressive disorder      Hypertension      Multiple system atrophy P (H)      Rheumatic fever      Small bowel obstruction (H)      Thyroid disease        Past Surgical History:    Past Surgical History:   Procedure Laterality Date     AAA REPAIR  2015    [facility] bifurcation graft     CARPAL TUNNEL RELEASE RT/LT Bilateral 2013     HYSTERECTOMY  2013     JOINT REPLACEMENT Right 2003     LAPAROSCOPIC CHOLECYSTECTOMY N/A 12/12/2018    Procedure: LAPAROSCOPIC  "CHOLECYSTECTOMY;  Surgeon: Amadeo Sebastian MD;  Location: [facility] OR     ORTHOPEDIC SURGERY       SPINE SURGERY  1981    cervical spine fusion     THYROIDECTOMY         Family History:    Family History   Problem Relation Age of Onset     Hypertension Mother      Coronary Artery Disease Mother      Coronary Artery Disease Father      Other Cancer Brother      Parkinsonism Other        Social History:  Marital Status:   [5]  Social History     Tobacco Use     Smoking status: Former Smoker     Packs/day: 0.50     Types: Cigarettes     Quit date: 1994     Years since quittin.0     Smokeless tobacco: Never Used   Substance Use Topics     Alcohol use: No     Drug use: No        Medications:         acetaminophen (TYLENOL) 500 MG tablet       bisacodyl (DULCOLAX) 10 MG suppository       carbidopa-levodopa (SINEMET)  MG tablet       clotrimazole (LOTRIMIN) 1 % external cream       diclofenac (VOLTAREN) 1 % topical gel       DULoxetine (CYMBALTA) 20 MG capsule       gabapentin (NEURONTIN) 100 MG capsule       hydrochlorothiazide (HYDRODIURIL) 25 MG tablet       levothyroxine (SYNTHROID/LEVOTHROID) 100 MCG tablet       losartan (COZAAR) 50 MG tablet       oxyCODONE (ROXICODONE) 5 MG tablet       Polyethylene Glycol 3350 (MIRALAX PO)       polyethylene glycol-propylene glycol (SYSTANE ULTRA) 0.4-0.3 % SOLN ophthalmic solution       QUEtiapine (SEROQUEL) 25 MG tablet       senna-docusate (SENOKOT-S/PERICOLACE) 8.6-50 MG tablet       VITAMIN D3 25 MCG (1000 UT) tablet       Zinc Oxide 13 % CREA          Review of Systems  Pertinent positives and negatives listed in the HPI, all other systems reviewed and are negative.    Physical Exam   BP: (!) 226/119  Pulse: 85  Temp: 97.6  F (36.4  C)  Resp: 18  Height: 167.6 cm (5' 6\")  Weight: 68 kg (150 lb)  SpO2: 94 %      Physical Exam  BP (!) 143/84   Pulse 79   Temp 97.6  F (36.4  C) (Axillary)   Resp 13   Ht 1.676 m (5' 6\")   Wt 68 kg (150 lb)   SpO2 100% "   BMI 24.21 kg/m     GENERAL: Alert, cooperative, mild distress  HEAD: No scalp laceration, hematoma, or abrasion.  No tenderness.  EYES: Conjunctivae clear, EOM intact. PERLL, Pupils are 2 mm.  HEENT:  Normal external ears, normal TM's without evidence of hemotympanum.  No nasal discharge or evidence of septal hematoma. No facial instability or asymmetry. No evidence of intraoral trauma.  Intact bite without malalignment.  NECK: Mild midline tenderness, no lateral tenderness.  CHEST:  No crepitus or tenderness with AP or lateral compression  CARDIOVASCULAR : Nml rate, distal pulses are normal and symmetric  LUNGS: No respiratory distress.  GI: Soft, ND, NT.   PELVIS: Diffuse tenderness to palpation of the pelvis  BACK: No step-offs palpated, no tenderness to palpation of thoracic or lumbar spine.  EXTREMITIES: No obvious deformities, no tenderness to palpation.  NEUROLOGICAL : GCS= 15.  Awake, alert, and oriented x 3.  Cranial nerves II-XII grossly intact. Normal muscle strength and tone, sensation to light touch grossly intact in all extremities.  No focal deficits.   SKIN : Normal color, no jaundice or rash.  Right hand skin tear.      ED Course        Procedures               Critical Care time:  none  Trauma:  Level of trauma activation: Emergency Department evaluation  C-collar and immobilization: applied prior to arrival.  CSpine Clearance: by [place] C spine rules  GCS at arrival: 15  GCS at disposition: unchanged  Full Primary and Secondary survey with appropriate immobilization of spine completed in exam section.  Consults prior to admission or transfer: Orthopedics called   Procedures done in the ED: none              Results for orders placed or performed during the hospital encounter of 12/23/20 (from the past 24 hour(s))   CT Head w/o Contrast    Narrative    CT OF THE HEAD WITHOUT CONTRAST 12/23/2020 4:39 PM     COMPARISON: Head CT 10/10/2020    HISTORY: Head trauma, minor, GCS>=13, high clinical  risk, initial  exam.    TECHNIQUE: 5 mm thick axial CT images of the head were acquired  without IV contrast material.    FINDINGS: There is mild diffuse cerebral volume loss. There are subtle  patchy areas of decreased density in the cerebral white matter  bilaterally that are consistent with sequela of chronic small vessel  ischemic disease.    The ventricles and basal cisterns are within normal limits in  configuration given the degree of cerebral volume loss.  There is no  midline shift. There are no extra-axial fluid collections.    No intracranial hemorrhage, mass or recent infarct.    The visualized paranasal sinuses are well-aerated. There is no  mastoiditis. There are no fractures of the visualized bones. Tiny left  forehead scalp lipoma again noted.      Impression    IMPRESSION: Diffuse cerebral volume loss and cerebral white matter  changes consistent with chronic small vessel ischemic disease. No  evidence for acute intracranial pathology.        Radiation dose for this scan was reduced using automated exposure  control, adjustment of the mA and/or kV according to patient size, or  iterative reconstruction technique    LEI PEREIRA MD   Cervical spine CT w/o contrast    Narrative    CT OF THE CERVICAL SPINE WITHOUT CONTRAST   12/23/2020 4:39 PM     COMPARISON: CT cervical spine 10/10/2020    HISTORY: C-spine trauma, high clinical risk (NEXUS/CCR); fall from  standing.     TECHNIQUE: Axial images of the cervical spine were acquired without  intravenous contrast. Multiplanar reformations were created.        Impression    IMPRESSION: Minimal degenerative anterolisthesis of C7 upon T1 again  noted. Alignment of the cervical vertebrae is otherwise normal;  however, there is reversal of normal cervical lordosis centered at the  C4 level. Vertebral body heights of the cervical spine are normal.  Craniocervical alignment is normal. There are no fractures of the  cervical spine. Mild-moderate facet  arthropathy throughout the  cervical spine. Degenerative endplate spurring at C4-C5 again noted.  No significant spinal canal stenosis of the cervical spine. No  prevertebral soft tissue swelling.      Radiation dose for this scan was reduced using automated exposure  control, adjustment of the mA and/or kV according to patient size, or  iterative reconstruction technique    LEI PEREIRA MD   CT Thoracic Spine w/o Contrast    Narrative    CT OF THE THORACIC SPINE WITHOUT CONTRAST   12/23/2020 4:40 PM     COMPARISON: Thoracic spine x-ray series 4/6/2020    HISTORY: Thoracolumbar spine trauma, minor-moderate, low back pain.     TECHNIQUE: Axial CT images of the thoracic spine were acquired without  intravenous contrast. Multiplanar reformations were created from the  axial source images.        Impression    IMPRESSION:  Right convex curvature of the thoracic spine centered at  approximately T10 again noted. Alignment of the thoracic vertebrae  otherwise normal. Vertebral body heights are normal. No recent  fractures. Probable chronic healed fracture through the left  transverse process of T1. No bony spinal canal stenosis of the  thoracic spine.      Radiation dose for this scan was reduced using automated exposure  control, adjustment of the mA and/or kV according to patient size, or  iterative reconstruction technique    LEI PEREIRA MD   Lumbar spine CT w/o contrast    Narrative    CT OF THE LUMBAR SPINE WITHOUT CONTRAST  12/23/2020 4:42 PM     COMPARISON: CT lumbar spine 10/10/2020    HISTORY: Thoracolumbar spine trauma, minor-moderate, low back pain.     TECHNIQUE: Axial images of the lumbar spine were acquired without  intravenous contrast. Multiplanar reformations were created from the  axial source images.      FINDINGS:    1. 5 lumbar type vertebrae. Moderate left convex curvature of the  lumbar spine centered at L3. Mild degenerative left lateral listhesis  of C3 upon C4. Alignment otherwise normal.  Degenerative Schmorl's node  deformities in the opposing endplates at L3-L4 and of the superior and  inferior endplates of L5 again noted. No fractures. Severe facet  arthropathy throughout the lumbar spine again noted. Moderate  degenerative spinal canal stenosis at L3-L4 again noted.  2. Partial visualization of recent-appearing fractures of the right  sacral ala. Pelvis CT recommended for further evaluation.      Radiation dose for this scan was reduced using automated exposure  control, adjustment of the mA and/or kV according to patient size, or  iterative reconstruction technique    LEI PEREIRA MD   CT Pelvis Bone wo Contrast    Narrative    CT PELVIS BONE WITHOUT CONTRAST 12/23/2020 4:44 PM    INDICATION: 80-year-old patient with pelvic trauma and concern for  fracture.    COMPARISON: 10/10/2020 CT.    TECHNIQUE: Noncontrast. Axial, sagittal and coronal thin-section  reconstruction. Dose reduction techniques were used.     CONTRAST: None.    FINDINGS: Mildly comminuted and minimally displaced longitudinal  oblique fracture of the right sacral ala. Oblique mildly displaced  fracture of the right superior pubic ramus-body. Oblique minimally  displaced fracture of the right inferior pubic ramus-body. Minimally  displaced fracture of the left superior pubic ramus. Minimally  displaced fracture of the left inferior pubic ramus.    No additional acute fractures. Compression of the superior endplates  of the L4 and L5 vertebral bodies (unchanged). Degenerative disc  disease and facet arthropathy in the lower lumbar spine. Moderate  bilateral sacroiliac degenerative arthrosis. Right total hip  arthroplasty; the components are well seated without evidence of  complication. Mild left hip degenerative arthrosis. Probable  postoperative change in the right iliac wing. Bone demineralization.    No asymmetric muscle atrophy or enlargement. Soft tissue  thickening-stranding in the prevesicular space (space of Retzius).  No  free fluid in the pelvis. Moderate stool volume. Aortobiiliac vascular  stent. Atherosclerotic calcification.      Impression    IMPRESSION:  1.  Acute fractures of the right sacral ala, right superior pubic  ramus-body, right inferior pubic ramus-body, left superior pubic  ramus, and left inferior pubic ramus.  2.  Old compression fractures of the L4 and L5 vertebral bodies,  unchanged.  3.  Intact right total hip arthroplasty.  4.  Edema-hemorrhage in the space of Retzius.    JAYNA MOBLEY MD   Asymptomatic SARS-CoV-2 COVID-19 Virus (Coronavirus) by PCR    Specimen: Nasopharyngeal   Result Value Ref Range    SARS-CoV-2 Virus Specimen Source Nasopharyngeal     SARS-CoV-2 PCR Result NEGATIVE     SARS-CoV-2 PCR Comment (Note)        Medications   morphine (PF) injection 4 mg (4 mg Intravenous Given 12/23/20 1535)   ketorolac (TORADOL) injection 15 mg (15 mg Intravenous Given 12/23/20 1534)       Assessments & Plan (with Medical Decision Making)   80-year-old female presents for evaluation of her fall.  Temperature is 36.4  C, heart 85, SPO2 is 94% on room air.  She is given IV morphine and ketorolac for symptoms.  Multiple images obtained, all images reviewed by myself as well as radiology reads reviewed.  No signs of intracranial hemorrhage.  No signs of spinal injury.  She does have multiple pelvic fractures including right sacral ala fracture and right pubic rami fractures.  The patient is uncomfortable and is unable to get up and ambulate and therefore will need admission to the hospital.  Unfortunately there are no hospital beds available here and the patient will be transferred to the [facility] for further treatment.  I discussed the case with the on-call orthopedic surgeon, Dr. Flores who says that these injuries are nonoperative.  I have also discussed the case with the on-call emergency physician, Dr. Simon who agrees to accept the patient in transfer.    I have reviewed  the nursing notes.    I have reviewed the findings, diagnosis, plan and need for follow up with the patient.       New Prescriptions    No medications on file       Final diagnoses:   Closed fracture of sacrum, unspecified portion of sacrum, initial encounter (H)   Closed fracture of ramus of right pubis, initial encounter (H)       12/23/2020   [facility] EMERGENCY DEPT     Juan Luis Felder MD  12/23/20 2128

## 2020-12-24 LAB
ALBUMIN SERPL-MCNC: 3.1 G/DL (ref 3.4–5)
ALP SERPL-CCNC: 65 U/L (ref 40–150)
ALT SERPL W P-5'-P-CCNC: 18 U/L (ref 0–50)
ANION GAP SERPL CALCULATED.3IONS-SCNC: 4 MMOL/L (ref 3–14)
ANION GAP SERPL CALCULATED.3IONS-SCNC: 5 MMOL/L (ref 3–14)
APTT PPP: 30 SEC (ref 22–37)
AST SERPL W P-5'-P-CCNC: 22 U/L (ref 0–45)
BILIRUB SERPL-MCNC: 0.9 MG/DL (ref 0.2–1.3)
BUN SERPL-MCNC: 26 MG/DL (ref 7–30)
BUN SERPL-MCNC: 26 MG/DL (ref 7–30)
CALCIUM SERPL-MCNC: 8.5 MG/DL (ref 8.5–10.1)
CALCIUM SERPL-MCNC: 8.6 MG/DL (ref 8.5–10.1)
CHLORIDE SERPL-SCNC: 103 MMOL/L (ref 94–109)
CHLORIDE SERPL-SCNC: 104 MMOL/L (ref 94–109)
CO2 SERPL-SCNC: 30 MMOL/L (ref 20–32)
CO2 SERPL-SCNC: 33 MMOL/L (ref 20–32)
CREAT SERPL-MCNC: 0.53 MG/DL (ref 0.52–1.04)
CREAT SERPL-MCNC: 0.66 MG/DL (ref 0.52–1.04)
ERYTHROCYTE [DISTWIDTH] IN BLOOD BY AUTOMATED COUNT: 12.3 % (ref 10–15)
ERYTHROCYTE [DISTWIDTH] IN BLOOD BY AUTOMATED COUNT: 12.3 % (ref 10–15)
GFR SERPL CREATININE-BSD FRML MDRD: 83 ML/MIN/{1.73_M2}
GFR SERPL CREATININE-BSD FRML MDRD: 89 ML/MIN/{1.73_M2}
GLUCOSE SERPL-MCNC: 87 MG/DL (ref 70–99)
GLUCOSE SERPL-MCNC: 98 MG/DL (ref 70–99)
HCT VFR BLD AUTO: 36.4 % (ref 35–47)
HCT VFR BLD AUTO: 37.6 % (ref 35–47)
HGB BLD-MCNC: 11.5 G/DL (ref 11.7–15.7)
HGB BLD-MCNC: 12.2 G/DL (ref 11.7–15.7)
INR PPP: 1.04 (ref 0.86–1.14)
MAGNESIUM SERPL-MCNC: 2 MG/DL (ref 1.6–2.3)
MCH RBC QN AUTO: 30.7 PG (ref 26.5–33)
MCH RBC QN AUTO: 31.8 PG (ref 26.5–33)
MCHC RBC AUTO-ENTMCNC: 31.6 G/DL (ref 31.5–36.5)
MCHC RBC AUTO-ENTMCNC: 32.4 G/DL (ref 31.5–36.5)
MCV RBC AUTO: 97 FL (ref 78–100)
MCV RBC AUTO: 98 FL (ref 78–100)
PLATELET # BLD AUTO: 151 10E9/L (ref 150–450)
PLATELET # BLD AUTO: 159 10E9/L (ref 150–450)
POTASSIUM SERPL-SCNC: 3.3 MMOL/L (ref 3.4–5.3)
POTASSIUM SERPL-SCNC: 4 MMOL/L (ref 3.4–5.3)
PROT SERPL-MCNC: 6.6 G/DL (ref 6.8–8.8)
RBC # BLD AUTO: 3.74 10E12/L (ref 3.8–5.2)
RBC # BLD AUTO: 3.84 10E12/L (ref 3.8–5.2)
SODIUM SERPL-SCNC: 139 MMOL/L (ref 133–144)
SODIUM SERPL-SCNC: 140 MMOL/L (ref 133–144)
WBC # BLD AUTO: 5.7 10E9/L (ref 4–11)
WBC # BLD AUTO: 6 10E9/L (ref 4–11)

## 2020-12-24 PROCEDURE — 250N000011 HC RX IP 250 OP 636: Performed by: NURSE PRACTITIONER

## 2020-12-24 PROCEDURE — 250N000011 HC RX IP 250 OP 636: Performed by: SURGERY

## 2020-12-24 PROCEDURE — 93005 ELECTROCARDIOGRAM TRACING: CPT

## 2020-12-24 PROCEDURE — 250N000013 HC RX MED GY IP 250 OP 250 PS 637: Performed by: NURSE PRACTITIONER

## 2020-12-24 PROCEDURE — 96376 TX/PRO/DX INJ SAME DRUG ADON: CPT | Performed by: EMERGENCY MEDICINE

## 2020-12-24 PROCEDURE — 250N000013 HC RX MED GY IP 250 OP 250 PS 637: Performed by: SURGERY

## 2020-12-24 PROCEDURE — 96366 THER/PROPH/DIAG IV INF ADDON: CPT | Performed by: EMERGENCY MEDICINE

## 2020-12-24 PROCEDURE — 93010 ELECTROCARDIOGRAM REPORT: CPT | Performed by: INTERNAL MEDICINE

## 2020-12-24 PROCEDURE — 96361 HYDRATE IV INFUSION ADD-ON: CPT | Performed by: EMERGENCY MEDICINE

## 2020-12-24 PROCEDURE — 83735 ASSAY OF MAGNESIUM: CPT | Performed by: SURGERY

## 2020-12-24 PROCEDURE — 120N000003 HC R&B IMCU UMMC

## 2020-12-24 PROCEDURE — 258N000003 HC RX IP 258 OP 636: Performed by: SURGERY

## 2020-12-24 PROCEDURE — 80053 COMPREHEN METABOLIC PANEL: CPT | Performed by: SURGERY

## 2020-12-24 PROCEDURE — 99223 1ST HOSP IP/OBS HIGH 75: CPT | Performed by: PEDIATRICS

## 2020-12-24 PROCEDURE — 85027 COMPLETE CBC AUTOMATED: CPT | Performed by: SURGERY

## 2020-12-24 PROCEDURE — 85730 THROMBOPLASTIN TIME PARTIAL: CPT | Performed by: SURGERY

## 2020-12-24 PROCEDURE — 99233 SBSQ HOSP IP/OBS HIGH 50: CPT | Performed by: NURSE PRACTITIONER

## 2020-12-24 PROCEDURE — 85610 PROTHROMBIN TIME: CPT | Performed by: SURGERY

## 2020-12-24 PROCEDURE — 80048 BASIC METABOLIC PNL TOTAL CA: CPT | Performed by: SURGERY

## 2020-12-24 PROCEDURE — 36415 COLL VENOUS BLD VENIPUNCTURE: CPT | Performed by: SURGERY

## 2020-12-24 PROCEDURE — 96365 THER/PROPH/DIAG IV INF INIT: CPT | Performed by: EMERGENCY MEDICINE

## 2020-12-24 RX ORDER — ACETAMINOPHEN 650 MG/1
650 SUPPOSITORY RECTAL EVERY 4 HOURS PRN
Status: DISCONTINUED | OUTPATIENT
Start: 2020-12-24 | End: 2020-12-24

## 2020-12-24 RX ORDER — CALCIUM CARBONATE 500 MG/1
1 TABLET, CHEWABLE ORAL DAILY PRN
COMMUNITY
End: 2021-02-27

## 2020-12-24 RX ORDER — ONDANSETRON 2 MG/ML
4 INJECTION INTRAMUSCULAR; INTRAVENOUS EVERY 6 HOURS PRN
Status: DISCONTINUED | OUTPATIENT
Start: 2020-12-24 | End: 2020-12-30 | Stop reason: HOSPADM

## 2020-12-24 RX ORDER — NALOXONE HYDROCHLORIDE 0.4 MG/ML
0.4 INJECTION, SOLUTION INTRAMUSCULAR; INTRAVENOUS; SUBCUTANEOUS
Status: DISCONTINUED | OUTPATIENT
Start: 2020-12-24 | End: 2020-12-30 | Stop reason: HOSPADM

## 2020-12-24 RX ORDER — LOSARTAN POTASSIUM 25 MG/1
50 TABLET ORAL EVERY MORNING
Status: DISCONTINUED | OUTPATIENT
Start: 2020-12-25 | End: 2020-12-30 | Stop reason: HOSPADM

## 2020-12-24 RX ORDER — POTASSIUM CHLORIDE 7.45 MG/ML
10 INJECTION INTRAVENOUS
Status: COMPLETED | OUTPATIENT
Start: 2020-12-24 | End: 2020-12-24

## 2020-12-24 RX ORDER — LEVOTHYROXINE SODIUM 100 UG/1
100 TABLET ORAL DAILY
Status: DISCONTINUED | OUTPATIENT
Start: 2020-12-24 | End: 2020-12-30 | Stop reason: HOSPADM

## 2020-12-24 RX ORDER — NALOXONE HYDROCHLORIDE 0.4 MG/ML
0.2 INJECTION, SOLUTION INTRAMUSCULAR; INTRAVENOUS; SUBCUTANEOUS
Status: DISCONTINUED | OUTPATIENT
Start: 2020-12-24 | End: 2020-12-30 | Stop reason: HOSPADM

## 2020-12-24 RX ORDER — ACETAMINOPHEN 325 MG/1
975 TABLET ORAL EVERY 6 HOURS
Status: DISCONTINUED | OUTPATIENT
Start: 2020-12-24 | End: 2020-12-30 | Stop reason: HOSPADM

## 2020-12-24 RX ORDER — GABAPENTIN 400 MG/1
400 CAPSULE ORAL 2 TIMES DAILY
Status: DISCONTINUED | OUTPATIENT
Start: 2020-12-25 | End: 2020-12-29

## 2020-12-24 RX ORDER — CARBOXYMETHYLCELLULOSE SODIUM 5 MG/ML
2 SOLUTION/ DROPS OPHTHALMIC 3 TIMES DAILY
Status: DISCONTINUED | OUTPATIENT
Start: 2020-12-24 | End: 2020-12-30 | Stop reason: HOSPADM

## 2020-12-24 RX ORDER — QUETIAPINE FUMARATE 25 MG/1
25 TABLET, FILM COATED ORAL AT BEDTIME
Status: DISCONTINUED | OUTPATIENT
Start: 2020-12-24 | End: 2020-12-30 | Stop reason: HOSPADM

## 2020-12-24 RX ORDER — IBUPROFEN 400 MG/1
400 TABLET, FILM COATED ORAL
Status: DISCONTINUED | OUTPATIENT
Start: 2020-12-24 | End: 2020-12-29

## 2020-12-24 RX ORDER — CARBIDOPA AND LEVODOPA 25; 100 MG/1; MG/1
1 TABLET ORAL 3 TIMES DAILY
Status: DISCONTINUED | OUTPATIENT
Start: 2020-12-24 | End: 2020-12-30 | Stop reason: HOSPADM

## 2020-12-24 RX ORDER — TRAMADOL HYDROCHLORIDE 50 MG/1
50 TABLET ORAL EVERY 4 HOURS PRN
Status: DISCONTINUED | OUTPATIENT
Start: 2020-12-24 | End: 2020-12-24

## 2020-12-24 RX ORDER — METHOCARBAMOL 500 MG/1
500 TABLET, FILM COATED ORAL 2 TIMES DAILY
Status: DISCONTINUED | OUTPATIENT
Start: 2020-12-24 | End: 2020-12-24

## 2020-12-24 RX ORDER — CALCIUM CARBONATE 500 MG/1
500 TABLET, CHEWABLE ORAL DAILY PRN
Status: DISCONTINUED | OUTPATIENT
Start: 2020-12-24 | End: 2020-12-30 | Stop reason: HOSPADM

## 2020-12-24 RX ORDER — CYCLOBENZAPRINE HCL 5 MG
5 TABLET ORAL 3 TIMES DAILY PRN
Status: DISCONTINUED | OUTPATIENT
Start: 2020-12-24 | End: 2020-12-24

## 2020-12-24 RX ORDER — HYDROCHLOROTHIAZIDE 25 MG/1
25 TABLET ORAL DAILY
Status: DISCONTINUED | OUTPATIENT
Start: 2020-12-24 | End: 2020-12-30 | Stop reason: HOSPADM

## 2020-12-24 RX ORDER — HYDROMORPHONE HCL IN WATER/PF 6 MG/30 ML
0.2 PATIENT CONTROLLED ANALGESIA SYRINGE INTRAVENOUS ONCE
Status: COMPLETED | OUTPATIENT
Start: 2020-12-24 | End: 2020-12-24

## 2020-12-24 RX ORDER — VITAMIN B COMPLEX
50 TABLET ORAL DAILY
Status: DISCONTINUED | OUTPATIENT
Start: 2020-12-24 | End: 2020-12-30 | Stop reason: HOSPADM

## 2020-12-24 RX ORDER — OLANZAPINE 10 MG/2ML
5 INJECTION, POWDER, FOR SOLUTION INTRAMUSCULAR DAILY PRN
Status: DISCONTINUED | OUTPATIENT
Start: 2020-12-24 | End: 2020-12-30 | Stop reason: HOSPADM

## 2020-12-24 RX ORDER — AMOXICILLIN 250 MG
1 CAPSULE ORAL 2 TIMES DAILY
Status: DISCONTINUED | OUTPATIENT
Start: 2020-12-24 | End: 2020-12-25

## 2020-12-24 RX ORDER — BISACODYL 10 MG
10 SUPPOSITORY, RECTAL RECTAL DAILY PRN
Status: DISCONTINUED | OUTPATIENT
Start: 2020-12-24 | End: 2020-12-30 | Stop reason: HOSPADM

## 2020-12-24 RX ORDER — ACETAMINOPHEN 325 MG/1
650 TABLET ORAL EVERY 4 HOURS PRN
Status: DISCONTINUED | OUTPATIENT
Start: 2020-12-24 | End: 2020-12-24

## 2020-12-24 RX ORDER — DULOXETIN HYDROCHLORIDE 60 MG/1
60 CAPSULE, DELAYED RELEASE ORAL DAILY
Status: DISCONTINUED | OUTPATIENT
Start: 2020-12-24 | End: 2020-12-30 | Stop reason: HOSPADM

## 2020-12-24 RX ORDER — LIDOCAINE 40 MG/G
CREAM TOPICAL
Status: DISCONTINUED | OUTPATIENT
Start: 2020-12-24 | End: 2020-12-30 | Stop reason: HOSPADM

## 2020-12-24 RX ORDER — GABAPENTIN 100 MG/1
100 CAPSULE ORAL 3 TIMES DAILY
Status: DISCONTINUED | OUTPATIENT
Start: 2020-12-24 | End: 2020-12-24

## 2020-12-24 RX ORDER — LIDOCAINE 4 G/G
2-3 PATCH TOPICAL
Status: DISCONTINUED | OUTPATIENT
Start: 2020-12-24 | End: 2020-12-30 | Stop reason: HOSPADM

## 2020-12-24 RX ORDER — QUETIAPINE FUMARATE 50 MG/1
50 TABLET, EXTENDED RELEASE ORAL AT BEDTIME
Status: DISCONTINUED | OUTPATIENT
Start: 2020-12-24 | End: 2020-12-24

## 2020-12-24 RX ORDER — IBUPROFEN 600 MG/1
600 TABLET, FILM COATED ORAL 2 TIMES DAILY
Status: DISCONTINUED | OUTPATIENT
Start: 2020-12-24 | End: 2020-12-24

## 2020-12-24 RX ORDER — HYDROMORPHONE HYDROCHLORIDE 1 MG/ML
0.3 INJECTION, SOLUTION INTRAMUSCULAR; INTRAVENOUS; SUBCUTANEOUS EVERY 4 HOURS PRN
Status: DISCONTINUED | OUTPATIENT
Start: 2020-12-24 | End: 2020-12-24

## 2020-12-24 RX ORDER — POLYETHYLENE GLYCOL 3350 17 G/17G
17 POWDER, FOR SOLUTION ORAL 3 TIMES DAILY PRN
Status: DISCONTINUED | OUTPATIENT
Start: 2020-12-24 | End: 2020-12-30 | Stop reason: HOSPADM

## 2020-12-24 RX ORDER — LIDOCAINE 4 G/G
1 PATCH TOPICAL
Status: DISCONTINUED | OUTPATIENT
Start: 2020-12-24 | End: 2020-12-24

## 2020-12-24 RX ORDER — OLANZAPINE 5 MG/1
5 TABLET, ORALLY DISINTEGRATING ORAL 3 TIMES DAILY PRN
Status: DISCONTINUED | OUTPATIENT
Start: 2020-12-24 | End: 2020-12-30 | Stop reason: HOSPADM

## 2020-12-24 RX ORDER — GABAPENTIN 400 MG/1
800 CAPSULE ORAL AT BEDTIME
Status: DISCONTINUED | OUTPATIENT
Start: 2020-12-24 | End: 2020-12-29

## 2020-12-24 RX ORDER — AMOXICILLIN 250 MG
1-2 CAPSULE ORAL 2 TIMES DAILY
Status: DISCONTINUED | OUTPATIENT
Start: 2020-12-24 | End: 2020-12-30 | Stop reason: HOSPADM

## 2020-12-24 RX ORDER — ACETAMINOPHEN 325 MG/1
975 TABLET ORAL 3 TIMES DAILY
Status: DISCONTINUED | OUTPATIENT
Start: 2020-12-24 | End: 2020-12-24

## 2020-12-24 RX ORDER — ONDANSETRON 4 MG/1
4 TABLET, ORALLY DISINTEGRATING ORAL EVERY 6 HOURS PRN
Status: DISCONTINUED | OUTPATIENT
Start: 2020-12-24 | End: 2020-12-30 | Stop reason: HOSPADM

## 2020-12-24 RX ADMIN — ACETAMINOPHEN 975 MG: 325 TABLET, FILM COATED ORAL at 22:24

## 2020-12-24 RX ADMIN — ACETAMINOPHEN 650 MG: 325 TABLET, FILM COATED ORAL at 06:23

## 2020-12-24 RX ADMIN — POTASSIUM CHLORIDE 10 MEQ: 7.46 INJECTION, SOLUTION INTRAVENOUS at 03:08

## 2020-12-24 RX ADMIN — CYCLOBENZAPRINE HYDROCHLORIDE 5 MG: 5 TABLET, FILM COATED ORAL at 06:24

## 2020-12-24 RX ADMIN — DULOXETINE 60 MG: 20 CAPSULE, DELAYED RELEASE ORAL at 17:20

## 2020-12-24 RX ADMIN — HYDROMORPHONE HYDROCHLORIDE 0.3 MG: 1 INJECTION, SOLUTION INTRAMUSCULAR; INTRAVENOUS; SUBCUTANEOUS at 04:20

## 2020-12-24 RX ADMIN — CARBIDOPA AND LEVODOPA 1 TABLET: 25; 100 TABLET ORAL at 21:05

## 2020-12-24 RX ADMIN — OLANZAPINE 5 MG: 5 TABLET, ORALLY DISINTEGRATING ORAL at 14:01

## 2020-12-24 RX ADMIN — QUETIAPINE FUMARATE 25 MG: 25 TABLET ORAL at 22:24

## 2020-12-24 RX ADMIN — CARBIDOPA AND LEVODOPA 1 TABLET: 25; 100 TABLET ORAL at 17:20

## 2020-12-24 RX ADMIN — LIDOCAINE 1 PATCH: 560 PATCH PERCUTANEOUS; TOPICAL; TRANSDERMAL at 07:56

## 2020-12-24 RX ADMIN — TRAMADOL HYDROCHLORIDE 50 MG: 50 TABLET, FILM COATED ORAL at 07:06

## 2020-12-24 RX ADMIN — POTASSIUM CHLORIDE 10 MEQ: 7.46 INJECTION, SOLUTION INTRAVENOUS at 06:13

## 2020-12-24 RX ADMIN — HYDROMORPHONE HYDROCHLORIDE 0.2 MG: 0.2 INJECTION, SOLUTION INTRAMUSCULAR; INTRAVENOUS; SUBCUTANEOUS at 07:04

## 2020-12-24 RX ADMIN — DOCUSATE SODIUM AND SENNOSIDES 1 TABLET: 8.6; 5 TABLET, FILM COATED ORAL at 07:56

## 2020-12-24 RX ADMIN — IBUPROFEN 400 MG: 400 TABLET ORAL at 21:05

## 2020-12-24 RX ADMIN — POTASSIUM CHLORIDE 10 MEQ: 7.46 INJECTION, SOLUTION INTRAVENOUS at 04:52

## 2020-12-24 RX ADMIN — GABAPENTIN 100 MG: 100 CAPSULE ORAL at 10:56

## 2020-12-24 RX ADMIN — OLANZAPINE 5 MG: 10 INJECTION, POWDER, FOR SOLUTION INTRAMUSCULAR at 20:26

## 2020-12-24 RX ADMIN — METHOCARBAMOL 500 MG: 500 TABLET ORAL at 10:56

## 2020-12-24 RX ADMIN — POTASSIUM CHLORIDE 10 MEQ: 7.46 INJECTION, SOLUTION INTRAVENOUS at 07:55

## 2020-12-24 RX ADMIN — GABAPENTIN 100 MG: 100 CAPSULE ORAL at 15:55

## 2020-12-24 RX ADMIN — HYDROCHLOROTHIAZIDE 25 MG: 25 TABLET ORAL at 21:05

## 2020-12-24 RX ADMIN — DEXTROSE AND SODIUM CHLORIDE 1000 ML: 5; 900 INJECTION, SOLUTION INTRAVENOUS at 01:05

## 2020-12-24 RX ADMIN — GABAPENTIN 800 MG: 400 CAPSULE ORAL at 22:27

## 2020-12-24 RX ADMIN — OLANZAPINE 5 MG: 5 TABLET, ORALLY DISINTEGRATING ORAL at 10:56

## 2020-12-24 RX ADMIN — LIDOCAINE 2 PATCH: 560 PATCH PERCUTANEOUS; TOPICAL; TRANSDERMAL at 09:40

## 2020-12-24 RX ADMIN — ACETAMINOPHEN 975 MG: 325 TABLET, FILM COATED ORAL at 10:57

## 2020-12-24 ASSESSMENT — ACTIVITIES OF DAILY LIVING (ADL)
ADLS_ACUITY_SCORE: 24
ADLS_ACUITY_SCORE: 27
ADLS_ACUITY_SCORE: 20
ADLS_ACUITY_SCORE: 20

## 2020-12-24 ASSESSMENT — MIFFLIN-ST. JEOR: SCORE: 1226.75

## 2020-12-24 NOTE — H&P
[facility]     History and Physical / Consult note: Trauma Service       Date of Admission:  12/23/2020    Time of Admission/Consult Request (page/call): 12/23/2020    Time of my evaluation: 12/23/2020     Consulting services:  Orthopedics - Non-emergent consult: Called by ED    Assessment & Plan      Trauma mechanism: Fall  Time/date of injury: 12/23/2020     Known Injuries:  1.  Right sacral ala and Right pubic ramus fractures  2.  Pelvic edema/hemorrhage (space of Retzius)    Other diagnoses:  1.  Hypertension  2.  Adrenal adenoma  3.  Depressive disorder  4.  Arthritis    Plan:  1.  Orthopedic surgery consult: Nonoperative management.  Pending evaluation and formal recommendations.  2.  Admit to trauma surgery floor for pain control.  Patient confirms that she is DNR/DNI. CBC, CMP sent as they were not checked at OSH. Follow-up with  pelvic edema/hemorrhage.   3.  PT/OT consults  4.  Social work consult    Patient discussed with Shukri Omer MD Trauma attending    Pam Zamudio MD  Trauma Surgery [facility]  [id number]      Code status: DNR/DNI    ETOH: This patient was asked if in the last 3-6 months there has been a time when she had 4 or more drinks in a single day/outing.. Patient answer to the screening question was in the negative. No intervention needed.  Primary Care Physician   Lisa Parry    Chief Complaint   Pain in right pelvis    History is obtained from the patient, electronic health record and emergency department physician    History of Present Illness   Mehreen Camara is a 80 year old nursing home resident female seen in consultation for Yamilka Galindo DO from the [facility] emergency department for trauma evaluation.  Patient was transferred from [facility] Emergency Dept.  Patient had a fall at home and landed on her right hip.  She denied hitting her head and denies loss of consciousness.  Patient was  "taken to local hospital by EMS.  Work-up included CT head negative for intracranial bleeding, CT C-spine negative for acute injuries, CT of thoracic spine negative for acute injuries, CT lumbar spine negative for acute injuries and CT pelvis that showed acute fractures of the right sacral ala, right superior pubic ramus body, right inferior pubic ramus body, left superior pubic ramus, and left inferior pubic ramus, old compression fractures of L4 and L5 bodies and edema/hemorrhage in the space of Retzius.  Patient has remained hemodynamically stable since arrival.  Labs were not checked at the outside hospital.  We are going to send some labs.  She complains of pain all over her body, which she says \"it is not new\" but specially complains of right pelvic pain.    Past Medical History    I have reviewed this patient's medical history and updated it with pertinent information if needed.   Past Medical History:   Diagnosis Date     Adrenal adenoma     stable, thought not to be hormonally active     Arthritis      Depressive disorder      Hypertension      Multiple system atrophy P (H)      Rheumatic fever     thought to have had as a child     Small bowel obstruction (H)      Thyroid disease        Past Surgical History   I have reviewed this patient's surgical history and updated it with pertinent information if needed.  Past Surgical History:   Procedure Laterality Date     AAA REPAIR  2015    wtih bifurcation graft     CARPAL TUNNEL RELEASE RT/LT Bilateral 2013     HYSTERECTOMY  2013     JOINT REPLACEMENT Right 2003     LAPAROSCOPIC CHOLECYSTECTOMY N/A 12/12/2018    Procedure: LAPAROSCOPIC CHOLECYSTECTOMY;  Surgeon: Amadeo Sebastian MD;  Location: [facility] OR     ORTHOPEDIC SURGERY       SPINE SURGERY  1981    cervical spine fusion     THYROIDECTOMY  2011     Prior to Admission Medications   Prior to Admission Medications   Prescriptions Last Dose Informant Patient Reported? Taking?   DULoxetine (CYMBALTA) 20 MG capsule   " Yes No   Sig: Take 60 mg by mouth daily   Polyethylene Glycol 3350 (MIRALAX PO)   Yes No   Sig: Take 1 capful by mouth daily as needed    QUEtiapine (SEROQUEL) 25 MG tablet   Yes No   Sig: Take 1 tablet (25 mg) at bedtime and half-tablet (12.5 mg) as needed   VITAMIN D3 25 MCG (1000 UT) tablet   No No   Sig: TAKE 2 TABLETS (2000IU) BY MOUTH EVERY DAY DX OSTEOPOROSIS   Zinc Oxide 13 % CREA   Yes No   Sig: Apply topically daily    acetaminophen (TYLENOL) 500 MG tablet   Yes No   Sig: Take 1,000 mg by mouth 3 times daily   bisacodyl (DULCOLAX) 10 MG suppository   Yes No   Sig: Place 10 mg rectally daily as needed for constipation   carbidopa-levodopa (SINEMET)  MG tablet   No No   Sig: Take 1 tablet by mouth 3 times daily   clotrimazole (LOTRIMIN) 1 % external cream   Yes No   Sig: Apply topically 2 times daily as needed   diclofenac (VOLTAREN) 1 % topical gel   No No   Sig: Apply 2 g topically 2 times daily. May also apply 2 g 2 times daily as needed for moderate pain.   gabapentin (NEURONTIN) 100 MG capsule   Yes No   Sig: Take 300 mg in the morning, 300 mg at noon, and 600 mg at bedtime   hydrochlorothiazide (HYDRODIURIL) 25 MG tablet   No No   Sig: TAKE 1 TABLET BY MOUTH EVERY DAY DX HYPERTENSION AND LE EDEMA   levothyroxine (SYNTHROID/LEVOTHROID) 100 MCG tablet   No No   Sig: Take 1 tablet (100 mcg) by mouth daily   losartan (COZAAR) 50 MG tablet   No No   Sig: TAKE 1 TABLET BY MOUTH EVERY MORNING   oxyCODONE (ROXICODONE) 5 MG tablet   No No   Sig: TAKE 1 TABLET BY MOUTH EVERY 4 HOURS AS NEEDED FOR SEVERE PAIN   polyethylene glycol-propylene glycol (SYSTANE ULTRA) 0.4-0.3 % SOLN ophthalmic solution   Yes No   Sig: Place 2 drops into both eyes 3 times daily (and additionally as needed/requested)   senna-docusate (SENOKOT-S/PERICOLACE) 8.6-50 MG tablet   Yes No   Sig: Take 1 tablet by mouth 2 times daily (and 1 tablet twice daily as needed)      Facility-Administered Medications: None     Allergies   Allergies    Allergen Reactions     Penicillins Anaphylaxis, Rash and Shortness Of Breath     Aspirin Nausea and GI Disturbance     Atenolol Cough     Atorvastatin Muscle Pain (Myalgia) and Nausea and Vomiting     Bupropion Nausea     Clindamycin Other (See Comments)     Constipation      Codeine Nausea     Ibuprofen Nausea     Stomach upset     Lovastatin Muscle Pain (Myalgia)     Cephalexin Rash     Neck reddness       Social History   Social History     Socioeconomic History     Marital status:      Spouse name: Not on file     Number of children: Not on file     Years of education: Not on file     Highest education level: Not on file   Occupational History     Not on file   Social Needs     Financial resource strain: Not on file     Food insecurity     Worry: Not on file     Inability: Not on file     Transportation needs     Medical: Not on file     Non-medical: Not on file   Tobacco Use     Smoking status: Former Smoker     Packs/day: 0.50     Types: Cigarettes     Quit date: 1994     Years since quittin.0     Smokeless tobacco: Never Used   Substance and Sexual Activity     Alcohol use: No     Drug use: No     Sexual activity: Not Currently   Lifestyle     Physical activity     Days per week: Not on file     Minutes per session: Not on file     Stress: Not on file   Relationships     Social connections     Talks on phone: Not on file     Gets together: Not on file     Attends Christianity service: Not on file     Active member of club or organization: Not on file     Attends meetings of clubs or organizations: Not on file     Relationship status: Not on file     Intimate partner violence     Fear of current or ex partner: Not on file     Emotionally abused: Not on file     Physically abused: Not on file     Forced sexual activity: Not on file   Other Topics Concern     Parent/sibling w/ CABG, MI or angioplasty before 65F 55M? Not Asked   Social History Narrative     Not on file       Family History   I  have reviewed this patient's family history and updated it with pertinent information if needed.   Family History   Problem Relation Age of Onset     Hypertension Mother      Coronary Artery Disease Mother      Coronary Artery Disease Father      Other Cancer Brother      Parkinsonism Other        Review of Systems   CONSTITUTIONAL: No fever, chills, sweats, fatigue   EYES: no visual blurring, no double vision or visual loss  ENT: no decrease in hearing, no tinnitus, no vertigo, no hoarseness  RESPIRATORY: no shortness of breath, no cough, no sputum   CARDIOVASCULAR: no palpitations, no chest  pain, no exertional chest pain or pressure  GASTROINTESTINAL: no nausea or vomiting, or abd pain  GENITOURINARY: no dysuria, no frequency or hesitancy, no hematuria  MUSCULOSKELETAL: Pain in right pelvic region, otherwise no weakness, no redness, no swelling, no joint pain  SKIN: no rashes, ecchymoses, abrasions or lacerations  NEUROLOGIC: no numbness or tingling of hands, no numbness or tingling  of feet, no syncope, no tremors or weakness  PSYCHIATRIC: no sleep disturbances, no anxiety or depression    Physical Exam   Temp: 97.8  F (36.6  C) Temp src: Oral BP: (!) 149/77 Pulse: 78   Resp: 18 SpO2: 96 % O2 Device: Nasal cannula Oxygen Delivery: 2 LPM  Vital Signs with Ranges  Temp:  [97.6  F (36.4  C)-97.8  F (36.6  C)] 97.8  F (36.6  C)  Pulse:  [78-93] 78  Resp:  [6-61] 18  BP: (128-226)/() 149/77  SpO2:  [88 %-100 %] 96 % 150 lbs 0 oz    Primary Survey:  Airway: patient talking  Breathing: symmetric respiratory effort bilaterally  Circulation: central pulses present and peripheral pulses present  Disability: Pupils - left 3 mm and brisk, right 3 mm and brisk   Freda Coma Scale - Total 15/15  Eye Response (E): 4  4= spontaneous,  3= to verbal/voice, 2=  to pain, 1= No response   Verbal Response (V): 5   5= Orientated, converses,  4= Confused, converses, 3= Inappropriate words,  2= Incomprehensible sounds,  1=No  response   Motor Response (M): 6   6= Obeys commands, 5= Localizes to pain, 4= Withdrawal to pain, 3=Fexion to pain, 2= Extension to pain, 1= No response    Secondary Survey:  General: alert, oriented to person, place, time  Neuro: PERRLA. EOMI. CN II-XII grossly intact. No focal deficits. Strength 5/5 x 4 extremities.  Sensation intact.  Head: atraumatic, normocephalic, trachea midline  Eyes:  Pupils 3 mm, EOMI, corneas and conjunctivae clear  Ears: pearly grey bilateral TMs and non-inflamed external ear canals  Nose: nares patent, no drainage, nasal septum non-tender  Mouth/Throat: no exudates or erythema,  no dental tenderness or malocclusions, no tongue lacerations  Neck: no cervical collar present. No midline posterior tenderness, full AROM without pain.   Chest/Pulmonary: normal respiratory rate and rhythm,  bilateral clear breath sounds, no wheezes, rales or rhonchi, no chest wall tenderness or deformities,   Cardiovascular: S1, S2,  normal and regular rate and rhythm, no murmurs  Abdomen: soft, non-tender, no guarding, no rebound tenderness and no tenderness to palpation  : normal external genitalia, pelvis stable to lateral compression, no bravo, no urine assess/urine yellow and clear  Musculoskel/Extremities: Pain in right pelvic region, otherwise normal extremities, full AROM of major joints without tenderness, edema, erythema, ecchymosis, or abrasions. PP. Moderate edema of lower extremities.  Back/Spine: no deformity, no midline tenderness, no sacral tenderness, no step-offs and no abrasions or contusions  Hands: no gross deformities of hands or fingers. Full AROM of hand and fingers in flexion and extension.  strength equal and symmetric.   Psychiatric: affect/mood normal, cooperative, normal judgement/insight and memory intact  Skin: no rashes, laceration, ecchymosis, skin warm and dry.     Results for orders placed or performed during the hospital encounter of 12/23/20 (from the past 24  hour(s))   CT Head w/o Contrast    Narrative    CT OF THE HEAD WITHOUT CONTRAST 12/23/2020 4:39 PM     COMPARISON: Head CT 10/10/2020    HISTORY: Head trauma, minor, GCS>=13, high clinical risk, initial  exam.    TECHNIQUE: 5 mm thick axial CT images of the head were acquired  without IV contrast material.    FINDINGS: There is mild diffuse cerebral volume loss. There are subtle  patchy areas of decreased density in the cerebral white matter  bilaterally that are consistent with sequela of chronic small vessel  ischemic disease.    The ventricles and basal cisterns are within normal limits in  configuration given the degree of cerebral volume loss.  There is no  midline shift. There are no extra-axial fluid collections.    No intracranial hemorrhage, mass or recent infarct.    The visualized paranasal sinuses are well-aerated. There is no  mastoiditis. There are no fractures of the visualized bones. Tiny left  forehead scalp lipoma again noted.      Impression    IMPRESSION: Diffuse cerebral volume loss and cerebral white matter  changes consistent with chronic small vessel ischemic disease. No  evidence for acute intracranial pathology.        Radiation dose for this scan was reduced using automated exposure  control, adjustment of the mA and/or kV according to patient size, or  iterative reconstruction technique    LEI PEREIRA MD   Cervical spine CT w/o contrast    Narrative    CT OF THE CERVICAL SPINE WITHOUT CONTRAST   12/23/2020 4:39 PM     COMPARISON: CT cervical spine 10/10/2020    HISTORY: C-spine trauma, high clinical risk (NEXUS/CCR); fall from  standing.     TECHNIQUE: Axial images of the cervical spine were acquired without  intravenous contrast. Multiplanar reformations were created.        Impression    IMPRESSION: Minimal degenerative anterolisthesis of C7 upon T1 again  noted. Alignment of the cervical vertebrae is otherwise normal;  however, there is reversal of normal cervical lordosis centered  at the  C4 level. Vertebral body heights of the cervical spine are normal.  Craniocervical alignment is normal. There are no fractures of the  cervical spine. Mild-moderate facet arthropathy throughout the  cervical spine. Degenerative endplate spurring at C4-C5 again noted.  No significant spinal canal stenosis of the cervical spine. No  prevertebral soft tissue swelling.      Radiation dose for this scan was reduced using automated exposure  control, adjustment of the mA and/or kV according to patient size, or  iterative reconstruction technique    LEI PEREIRA MD   CT Thoracic Spine w/o Contrast    Narrative    CT OF THE THORACIC SPINE WITHOUT CONTRAST   12/23/2020 4:40 PM     COMPARISON: Thoracic spine x-ray series 4/6/2020    HISTORY: Thoracolumbar spine trauma, minor-moderate, low back pain.     TECHNIQUE: Axial CT images of the thoracic spine were acquired without  intravenous contrast. Multiplanar reformations were created from the  axial source images.        Impression    IMPRESSION:  Right convex curvature of the thoracic spine centered at  approximately T10 again noted. Alignment of the thoracic vertebrae  otherwise normal. Vertebral body heights are normal. No recent  fractures. Probable chronic healed fracture through the left  transverse process of T1. No bony spinal canal stenosis of the  thoracic spine.      Radiation dose for this scan was reduced using automated exposure  control, adjustment of the mA and/or kV according to patient size, or  iterative reconstruction technique    LEI PEREIRA MD   Lumbar spine CT w/o contrast    Narrative    CT OF THE LUMBAR SPINE WITHOUT CONTRAST  12/23/2020 4:42 PM     COMPARISON: CT lumbar spine 10/10/2020    HISTORY: Thoracolumbar spine trauma, minor-moderate, low back pain.     TECHNIQUE: Axial images of the lumbar spine were acquired without  intravenous contrast. Multiplanar reformations were created from the  axial source images.      FINDINGS:    1. 5  lumbar type vertebrae. Moderate left convex curvature of the  lumbar spine centered at L3. Mild degenerative left lateral listhesis  of C3 upon C4. Alignment otherwise normal. Degenerative Schmorl's node  deformities in the opposing endplates at L3-L4 and of the superior and  inferior endplates of L5 again noted. No fractures. Severe facet  arthropathy throughout the lumbar spine again noted. Moderate  degenerative spinal canal stenosis at L3-L4 again noted.  2. Partial visualization of recent-appearing fractures of the right  sacral ala. Pelvis CT recommended for further evaluation.      Radiation dose for this scan was reduced using automated exposure  control, adjustment of the mA and/or kV according to patient size, or  iterative reconstruction technique    LEI PEREIRA MD   CT Pelvis Bone wo Contrast    Narrative    CT PELVIS BONE WITHOUT CONTRAST 12/23/2020 4:44 PM    INDICATION: 80-year-old patient with pelvic trauma and concern for  fracture.    COMPARISON: 10/10/2020 CT.    TECHNIQUE: Noncontrast. Axial, sagittal and coronal thin-section  reconstruction. Dose reduction techniques were used.     CONTRAST: None.    FINDINGS: Mildly comminuted and minimally displaced longitudinal  oblique fracture of the right sacral ala. Oblique mildly displaced  fracture of the right superior pubic ramus-body. Oblique minimally  displaced fracture of the right inferior pubic ramus-body. Minimally  displaced fracture of the left superior pubic ramus. Minimally  displaced fracture of the left inferior pubic ramus.    No additional acute fractures. Compression of the superior endplates  of the L4 and L5 vertebral bodies (unchanged). Degenerative disc  disease and facet arthropathy in the lower lumbar spine. Moderate  bilateral sacroiliac degenerative arthrosis. Right total hip  arthroplasty; the components are well seated without evidence of  complication. Mild left hip degenerative arthrosis. Probable  postoperative change  in the right iliac wing. Bone demineralization.    No asymmetric muscle atrophy or enlargement. Soft tissue  thickening-stranding in the prevesicular space (space of Retzius). No  free fluid in the pelvis. Moderate stool volume. Aortobiiliac vascular  stent. Atherosclerotic calcification.      Impression    IMPRESSION:  1.  Acute fractures of the right sacral ala, right superior pubic  ramus-body, right inferior pubic ramus-body, left superior pubic  ramus, and left inferior pubic ramus.  2.  Old compression fractures of the L4 and L5 vertebral bodies,  unchanged.  3.  Intact right total hip arthroplasty.  4.  Edema-hemorrhage in the space of Retzius.    JAYNA MOBLEY MD   Asymptomatic SARS-CoV-2 COVID-19 Virus (Coronavirus) by PCR    Specimen: Nasopharyngeal   Result Value Ref Range    SARS-CoV-2 Virus Specimen Source Nasopharyngeal     SARS-CoV-2 PCR Result NEGATIVE     SARS-CoV-2 PCR Comment (Note)        Studies:  No orders to display       Pam Zamudio

## 2020-12-24 NOTE — PLAN OF CARE
OT: cancel, pt agitated and not redirectable to OT session. Pt refusing all OT cares and education today.

## 2020-12-24 NOTE — CONSULTS
[facility]  ORTHOPAEDIC SURGERY CONSULT - HISTORY AND PHYSICAL    DATE OF CONSULT: 12/24/2020 8:31 AM    REQUESTING PROVIDER: Shukri Johnson MD, MD - [facility] Staff.    CC: Pain after fall    DATE OF INJURY: 12/23/2020    HISTORY OF PRESENT ILLNESS:   The orthopaedic surgery service was consulted by Shukri Clemons MD for evaluation and treatment recommendations of R sacral ala and R pubic rami fractures.    Mehreen Camara is a 80 year old female with h/o HTN, depression, arthritis s/p R total hip replacement who sustained a GLF at her nursing home and landed on her R hip. Ms. Camara does not remember the incident. History obtained from chart review and other teams. After her fall she was taken to [facility] and transferred to [facility] for further workup. She has been HD stable but intermittently agitated while inpatient. She is DNR/DNI.    Unknown ambulatory status, pre-fall.     Currently Mehreen complains of pain but does not localize it to anywhere in particular.    Per nursing, Mehreen was able to swing her legs to the side of the bed earlier today.     PAST MEDICAL HISTORY:   Past Medical History:   Diagnosis Date     Adrenal adenoma     stable, thought not to be hormonally active     Arthritis      Depressive disorder      Hypertension      Multiple system atrophy P (H)      Rheumatic fever     thought to have had as a child     Small bowel obstruction (H)      Thyroid disease        PAST SURGICAL HISTORY:    Past Surgical History:   Procedure Laterality Date     AAA REPAIR  2015    wtih bifurcation graft     CARPAL TUNNEL RELEASE RT/LT Bilateral 2013     HYSTERECTOMY  2013     JOINT REPLACEMENT Right 2003     LAPAROSCOPIC CHOLECYSTECTOMY N/A 12/12/2018    Procedure: LAPAROSCOPIC CHOLECYSTECTOMY;  Surgeon: Amadeo Sebastian MD;  Location: [facility] OR     ORTHOPEDIC SURGERY       SPINE SURGERY  1981    cervical spine fusion     THYROIDECTOMY  2011   R total hip replacement  L total knee  replacement    MEDICATIONS:   Prior to Admission medications    Medication Sig Last Dose Taking? Auth Provider   acetaminophen (TYLENOL) 500 MG tablet Take 1,000 mg by mouth 3 times daily   Reported, Patient   bisacodyl (DULCOLAX) 10 MG suppository Place 10 mg rectally daily as needed for constipation   Reported, Patient   carbidopa-levodopa (SINEMET)  MG tablet Take 1 tablet by mouth 3 times daily   Lisa Parry APRN CNP   clotrimazole (LOTRIMIN) 1 % external cream Apply topically 2 times daily as needed   Reported, Patient   diclofenac (VOLTAREN) 1 % topical gel Apply 2 g topically 2 times daily. May also apply 2 g 2 times daily as needed for moderate pain.   Lisa Parry APRN CNP   DULoxetine (CYMBALTA) 20 MG capsule Take 60 mg by mouth daily   Reported, Patient   gabapentin (NEURONTIN) 100 MG capsule Take 300 mg in the morning, 300 mg at noon, and 600 mg at bedtime   Reported, Patient   hydrochlorothiazide (HYDRODIURIL) 25 MG tablet TAKE 1 TABLET BY MOUTH EVERY DAY DX HYPERTENSION AND LE EDEMA   Lisa Parry APRN CNP   levothyroxine (SYNTHROID/LEVOTHROID) 100 MCG tablet Take 1 tablet (100 mcg) by mouth daily   Cathy Sargent APRN CNP   losartan (COZAAR) 50 MG tablet TAKE 1 TABLET BY MOUTH EVERY MORNING   Lisa Parry APRN CNP   oxyCODONE (ROXICODONE) 5 MG tablet TAKE 1 TABLET BY MOUTH EVERY 4 HOURS AS NEEDED FOR SEVERE PAIN   Lisa Parry APRN CNP   Polyethylene Glycol 3350 (MIRALAX PO) Take 1 capful by mouth daily as needed    Reported, Patient   polyethylene glycol-propylene glycol (SYSTANE ULTRA) 0.4-0.3 % SOLN ophthalmic solution Place 2 drops into both eyes 3 times daily (and additionally as needed/requested)   Reported, Patient   QUEtiapine (SEROQUEL) 25 MG tablet Take 1 tablet (25 mg) at bedtime and half-tablet (12.5 mg) as needed   Reported, Patient   senna-docusate (SENOKOT-S/PERICOLACE) 8.6-50 MG tablet Take 1 tablet by mouth 2 times daily (and 1 tablet twice daily as  "needed)   Reported, Patient   VITAMIN D3 25 MCG (1000 UT) tablet TAKE 2 TABLETS (2000IU) BY MOUTH EVERY DAY DX OSTEOPOROSIS   Sohan, Lisa R, APRN CNP   Zinc Oxide 13 % CREA Apply topically daily    Reported, Patient       ALLERGIES:   Penicillins, Aspirin, Atenolol, Atorvastatin, Bupropion, Clindamycin, Codeine, Ibuprofen, Lovastatin, and Cephalexin    SOCIAL HISTORY:   Living situation: Patient lives in a nursing home.  Unable to obtain additional information due to patient's delirium.  Former nicotine use, per chart review.    FAMILY HISTORY:  Family History   Problem Relation Age of Onset     Hypertension Mother      Coronary Artery Disease Mother      Coronary Artery Disease Father      Other Cancer Brother      Parkinsonism Other          REVIEW OF SYSTEMS:   Positive for confusion, disorientation. Otherwise a 10-point reviews of systems was negative except as noted above in the HPI.     PHYSICAL EXAM:   Vitals:    12/24/20 0400 12/24/20 0546 12/24/20 0600 12/24/20 0751   BP: (!) 140/102 (!) 151/102 (!) 168/99 (!) 159/97   BP Location:  Left arm  Left arm   Pulse: 76 74 75 78   Resp:  20  20   Temp:  98.1  F (36.7  C)  98.2  F (36.8  C)   TempSrc:  Oral  Oral   SpO2:  98%  94%   Weight:   74 kg (163 lb 2.3 oz)    Height:   1.676 m (5' 6\")      General: Awake but not attending to conversation. Not oriented to self, place, time. Laying in bed.  Neuro: CN II-XII grossly intact.   Skin: Scattered ecchymosis. No open skin.  Lungs: Breathing comfortably and nonlabored, no wheezes or stridor noted.  Heart/Cardiovascular: extremities warm, well perfused. no peripheral cyanosis.  Pelvis: Stable to AP and Lateral compression, but tender.  Right Upper Extremity: No deformity, skin intact. No significant tenderness to palpation over clavicle, AC joint, shoulder, arm, elbow, forearm, wrist.  Left Upper Extremity: No deformity, skin intact. No significant tenderness to palpation over clavicle, AC joint, shoulder, arm, " elbow, forearm, wrist.   Right Lower Extremity: No deformity, skin intact. Mild to mod edema. No significant tenderness to palpation over thigh, knee, leg, ankle/foot. 5/5 strength in plantar and dorsiflexion. SILT in dorsal feet.   Left Lower Extremity: No deformity, skin intact. Well healed TKA scar. No significant tenderness to palpation over thigh, knee, leg, ankle/foot. 5/5 strength in plantar and dorsiflexion. SILT in dorsal feet.     LABS:  Hemoglobin   Date Value Ref Range Status   12/24/2020 11.5 (L) 11.7 - 15.7 g/dL Final   10/10/2020 13.5 11.7 - 15.7 g/dL Final     WBC   Date Value Ref Range Status   12/24/2020 6.0 4.0 - 11.0 10e9/L Final     Platelet Count   Date Value Ref Range Status   12/24/2020 159 150 - 450 10e9/L Final     INR   Date Value Ref Range Status   12/24/2020 1.04 0.86 - 1.14 Final     Creatinine   Date Value Ref Range Status   12/24/2020 0.66 0.52 - 1.04 mg/dL Final     Glucose   Date Value Ref Range Status   12/24/2020 98 70 - 99 mg/dL Final       IMAGING:  CT pelvis, 12/24/2020  R sacral ala fracture. Multiple pubic rami fractures. R total hip in good alignment without evidence of periprosthetic fracture or dislocation.    IMPRESSION:   Mehreen Camara is a 80 year old female s/p GLF who presents with LC1 pelvis with substantial R sacral component. We would prefer to manage her injuries nonoperatively. Agree with palliative involvement.     RECOMMENDATIONS:   - Admit to Trauma.  - Plan for OR: Non-operative management  - Anticoagulation/DVT Prophylaxis: Per primary  - X-rays/Imaging: Pelvis inlet/outlet views (ordered)  - Activity: As tolerated  - Weight bearing: as tolerated.   - Pain control: per primary. Would recommend minimizing narcotics, as able  - PT/OT for assistance with mobilization.  - Diet: per primary  - Follow-up: orthopedics PA in clinic in 3 weeks. (referral placed, our schedulers will call patient)  - Disposition: Per primary. No operative plans. We will continue  to follow peripherally. Please page ortho team if patient is unable to mobilize over next few days.    Assessment and Plan discussed with Dr. Cordova, Orthopaedic Surgery staff.     Cris Rodarte MD 12/24/2020 8:31 PM  Orthopaedic Surgery Resident, PGY-1  Pager: [phone]

## 2020-12-24 NOTE — PROGRESS NOTES
[facility]   Trauma Service Progress Note    Date of Service (when I saw the patient): 12/24/2020     Assessment & Plan   Trauma mechanism: Fall, unable to do true tertiary 2nd to delirium  Time/date of injury: 12/23/2020   Called daughter Eladio at 3:12 no answer at home or mobile number     Known Injuries:  1.  Right sacral ala and Right pubic ramus fractures  2.  Pelvic edema/hemorrhage (space of Retzius)     Other diagnoses:  1.  Hypertension  2.  Adrenal adenoma  3.  Depressive disorder  4.  Arthritis  5.  Dementia    Neuro/Pain:  # Encephalopathy, unknown etiology, likely opioid induced  # Baseline dementia  # Acute pain   - Monitor neurological status. Delirium prevention and precautions.   - Add haldol PRN  - Stop all narcotics and robaxin for now  -  Takes gabapentin at home 300 mg bid PLUS 600 mg at night , will increase to 400 mg bid and 800mg q hs  - continue topicals lidoderm/menthol  - Continue home diclofenac  # Depression  - Continue home meds  # Dementia  -- Continue home meds    Pulmonary:  No acute issues    Cardiovascular:    # Hx HTN  - Continue meds    GI/Nutrition:    Hys ofNo acute needs.  Follow intake    Fluids/Electrolytes:  # Hypokalemia -- resolved post replacement  - electrolyte replacement protocol if needed    Renal:   No acute issues    :  No acute issues  -  Endocrine:  # Hx of thyroid dz-- continue home replacement    Infectious disease:   #No acute issues  Status recovered COVID    Hematology:    #  Pelvic hemorrhage  -  Monitor and trend hb. Threshold for transfusion if hgb <7.0 or signs/symptoms of hypoperfusion.       Musculoskeletal:  # 1.  Acute fractures of the right sacral ala, right superior pubic  ramus-body, right inferior pubic ramus-body, left superior pubic  ramus, and left inferior pubic ramus.  # 2.  Old compression fractures of the L4 and L5 vertebral bodies,  unchanged.  - ortho consult  - Physical and occupational  therapy consults.  - Pain control    Skin:  - dilgent cares to prevent skin breakdown a    General Cares:    PPI/H2 blocker:  na   DVT prophylaxis: start enoxaparin.  Monitor for signs of increased pelvic hemmorhage   Bowel Regimen/Date of last stool: PTA   Pulmonary toilet:    ETOH screen completed done on H&P              Frailty Score: high    Code status: DNR/DNI     Discharge goals:     Adequate pain management: No    VSS x24 hours: yes     Hemoglobin stable x 48 hours: no    Ambulating safely and/or therapy evals complete: no    Drains/lines removed or plan in place to manage: NA    Teaching done: No    Other:  Expected D/C date: Monday    Interval History   Pt lying in bed periodically yelling out, wants someone to move the bed to her house.  Forgetful. Somewhat redirectable but not all the way to orientation.  C/o pelvic pain, denies cp, sob, N/V, Headache, musculoskeletal pain, numbness or tingling  ROS x 8 negative with exception of those things listed in interval hx    Physical Exam   Temp: 97.9  F (36.6  C) Temp src: Axillary BP: (!) 158/81 Pulse: 77   Resp: 20 SpO2: 93 % O2 Device: None (Room air) Oxygen Delivery: 2 LPM  Vitals:    12/23/20 2216 12/24/20 0600   Weight: 68 kg (150 lb) 74 kg (163 lb 2.3 oz)     Vital Signs with Ranges  Temp:  [97.8  F (36.6  C)-98.2  F (36.8  C)] 97.9  F (36.6  C)  Pulse:  [71-93] 77  Resp:  [6-61] 20  BP: (124-212)/() 158/81  SpO2:  [88 %-100 %] 93 %  I/O last 3 completed shifts:  In: 160 [P.O.:60; I.V.:100]  Out: -     [place] Coma Scale - Total 14/15-- confusion    Constitutional: Awake, alert, cooperative, no apparent distress.  Eyes: Lids and lashes normal, pupils equal,  ENT: Normocephalic, with abrasion  Respiratory: No increased work of breathing, good air exchange, clear to auscultation bilaterally, no crackles or wheezing.  Cardiovascular:  regular rate and rhythm, normal S1 and S2, no S3 or S4 and no murmur.   GI: abdomen soft, non-distended, non-tender,  no guarding  Genitourinary:  No urine to assess  Skin:  Normal skin color, left   Musculoskeletal: There is no redness, warmth, or swelling of the joints.  Pedal pulse palpated.  Neurologic: Awake, alert, disoriented  No focal deficits.  Neuropsychiatric: Redirectable for short periods of time, hallucinations and delusions    HAILEE Torres CNP  To contact the trauma service use job code pager [phone],   Numeric texts or alpha text through [facility]

## 2020-12-24 NOTE — PLAN OF CARE
6B PT - holding    PT: Orders received, chart reviewed, discussed with OT. Pt agitated and not redirectable today. Anticipating limited therapy tolerance given pain and non-WB orders. OT to initiate for UE exercises and discharge planing. PT will follow peripherally

## 2020-12-24 NOTE — CONSULTS
Care Management Initial Consult    General Information  Assessment completed with: Patient, Children, Patient (Mehreen), Daughter (Eladio)  Type of CM/SW Visit: Initial Assessment  Primary Care Provider verified and updated as needed: Yes   Readmission within the last 30 days: no previous admission in last 30 days      Reason for Consult: discharge planning  Advance Care Planning: Advance Care Planning Reviewed: present on chart       Communication Assessment  Patient's communication style: spoken language (English or Bilingual)    Hearing Difficulty or Deaf: no   Wear Glasses or Blind: yes    Cognitive  Cognitive/Neuro/Behavioral: WDL  Level of Consciousness: confused  Arousal Level: opens eyes spontaneously  Orientation: disoriented to, place  Mood/Behavior: calm, cooperative  Best Language: 0 - No aphasia  Speech: clear, spontaneous, logical    Living Environment:   People in home: alone     Current living Arrangements: assisted living    Name of Facility: [facility]   Able to return to prior arrangements: no  Living Arrangement Comments: Pt is from [facility] but likely cannot return at d/c; will need TCU and possible LTC placement    Family/Social Support:  Marital Status:   Children          Description of Support System: Supportive, Involved    Support Assessment: Adequate family and caregiver support, Adequate social supports    Employment/Financial:  Employment Status: retired     Financial Concerns: No concerns identified   Referral to Financial Counselor: No     Lifestyle & Psychosocial Needs:     Socioeconomic History     Marital status:      Spouse name: Not on file     Number of children: Not on file     Years of education: Not on file     Highest education level: Not on file     Tobacco Use     Smoking status: Former Smoker     Packs/day: 0.50     Types: Cigarettes     Quit date: 1994     Years since quittin.0     Smokeless tobacco: Never Used   Substance and Sexual Activity      Alcohol use: No     Drug use: No     Sexual activity: Not Currently     Functional Status:  Prior to admission patient needed assistance:   Assesssment of Functional Status: Needs placement in a SNF/TCF for rehabilitation    Mental Health Status:  Mental Health Status: No Current Concerns       Chemical Dependency Status:  Chemical Dependency Status: No Current Concerns           Values/Beliefs:  Spiritual, Cultural Beliefs, Jehovah's witness Practices, Values that affect care: yes    Description of Beliefs that Will Affect Care: Jehovah's witness          Discharge Planning:  Length of Stay (days): 1  Expected Discharge Date:  TBD  Concerns to be Addressed: discharge planning     Patient plan of care discussed at interdisciplinary rounds: Yes    Anticipated Discharge Disposition: Transitional Care  Anticipated Discharge Services: None  Anticipated Discharge DME: None    Patient/family educated on Medicare website which has current facility and service quality ratings: yes  Education Provided on the Discharge Plan:  yes  Patient/Family in Agreement with the Plan: yes    Referrals Placed by CM/SW: Post Acute Facilities  Private pay costs discussed: Not applicable    Additional Information:  WILLIS met with Pt at bedside to introduce self, SW role and discuss discharge planning per consult. However, Pt quite confused during SW visit. She was yelling out in pain upon SW entering her room, but when engaged in conversation did not yell out anymore. She was not aware she was in the hospital. She did acknowledge that she recently discharged from a TCU and had returned home and unfortunately had had a fall. WILLIS asked if Pt would be open to returning to a TCU and she said that she would; she is not sure if she will ever be able to return to her [facility] apartment again. WILLIS asked if she would like family involved in discharge planning and she asked that SW call her daughter Eladio.    WILLIS spoke with Eladio by phone today to introduce self and discuss  "discharge planning. Eladio is also in agreement with TCU placement, and thinks Pt may need LTC in the \"big picture.\" She is not sure Pt will be safe to return to her nursing home apartment again.     WILLIS discussed TCU/LTC options with Eladio. She would like SW to make referral to [facility], but also agreed to review list for alternatives. She hopes to review this list with the Pt directly as well. WILLIS emailed Medicare.gov listing to Eladio at [email or web address]    SW called [facility] (Ph: [phone], F: [phone]) and unfortunately they are not accepting any new admits over the Holiday or weekend. SW will need to f/u on Monday to initiate referral.     SW to continue to follow and assist with discharge plan.     ANGIE Robison, LICSW  6B Intermediate Care Unit   [facility]  Phone: [phone]  Pager: [phone]    "

## 2020-12-24 NOTE — PROVIDER NOTIFICATION
Paged Trauma MD at 0637 with update. Pt having intolerable levels of pain, yelling out in pain, she has 0.3mg IV Dilaudid Q4 hrs ordered, last dose at 0420, unable to give next dose until 0820. MD stated she would enter order for more pain medication. Awaiting new orders. Will continue to monitor per POC and update primary team with changes.

## 2020-12-24 NOTE — ED TRIAGE NOTES
Pt BIBA from [facility] to see trauma surgeon. Fell this AM at nursing home after tripping on chair, [facility] ED found multiple pelvic fractures. Pt denies hitting head in fall. VSS en route, pt received 4mg IV morphine from EMS. Pt on 2L O2.

## 2020-12-24 NOTE — PHARMACY-ADMISSION MEDICATION HISTORY
Admission medication history interview status for the 12/23/2020 admission is complete. See Epic admission navigator for allergy information, pharmacy, prior to admission medications and immunization status.     Medication history interview sources:  med list from [facility], dispense report    Changes made to PTA medication list (reason)  Added: Tums  Deleted: none  Changed: none    Additional medication history information (including reliability of information, actions taken by pharmacist):  -pt unable to participate in med history interview d/t confusion at this time  -med list from [facility] confirmed w/ dispense report refill hx  -Allergies were not reviewed      Prior to Admission medications    Medication Sig Last Dose Taking? Auth Provider   acetaminophen (TYLENOL) 500 MG tablet Take 1,000 mg by mouth every 6 hours as needed for pain  Past Week at Unknown time Yes Reported, Patient   bisacodyl (DULCOLAX) 10 MG suppository Place 10 mg rectally daily as needed for constipation Past Week at Unknown time Yes Reported, Patient   calcium carbonate (TUMS) 500 MG chewable tablet Take 1 chew tab by mouth daily as needed (indigestion) Past Month at Unknown time Yes Unknown, Entered By History   carbidopa-levodopa (SINEMET)  MG tablet Take 1 tablet by mouth 3 times daily Past Week at Unknown time Yes Lisa Parry APRN CNP   clotrimazole (LOTRIMIN) 1 % external cream Apply topically 2 times daily as needed Past Week at Unknown time Yes Reported, Patient   diclofenac (VOLTAREN) 1 % topical gel Apply 2 g topically 2 times daily. May also apply 2 g 2 times daily as needed for moderate pain.  Patient taking differently: No sig reported Past Week at Unknown time Yes Lisa Parry APRN CNP   DULoxetine (CYMBALTA) 20 MG capsule Take 60 mg by mouth daily Past Week at Unknown time Yes Reported, Patient   gabapentin (NEURONTIN) 100 MG capsule Take 300 mg in the morning, 300 mg at noon, and 600 mg at bedtime  Past Week at Unknown time Yes Reported, Patient   hydrochlorothiazide (HYDRODIURIL) 25 MG tablet TAKE 1 TABLET BY MOUTH EVERY DAY DX HYPERTENSION AND LE EDEMA Past Week at Unknown time Yes Lisa Parry APRN CNP   levothyroxine (SYNTHROID/LEVOTHROID) 100 MCG tablet Take 1 tablet (100 mcg) by mouth daily Past Week at Unknown time Yes Cathy Sargent APRN CNP   losartan (COZAAR) 50 MG tablet TAKE 1 TABLET BY MOUTH EVERY MORNING Past Week at Unknown time Yes Lisa Parry APRN CNP   oxyCODONE (ROXICODONE) 5 MG tablet TAKE 1 TABLET BY MOUTH EVERY 4 HOURS AS NEEDED FOR SEVERE PAIN Past Week at Unknown time Yes Lisa Parry APRN CNP   Polyethylene Glycol 3350 (MIRALAX PO) Take 1 capful by mouth daily as needed  Past Week at Unknown time Yes Reported, Patient   polyethylene glycol-propylene glycol (SYSTANE ULTRA) 0.4-0.3 % SOLN ophthalmic solution Place 2 drops into both eyes 3 times daily (and additionally as needed/requested) Past Week at Unknown time Yes Reported, Patient   QUEtiapine (SEROQUEL) 25 MG tablet Take 1 tablet (25 mg) at bedtime and half-tablet (12.5 mg) as needed Past Week at Unknown time Yes Reported, Patient   senna-docusate (SENOKOT-S/PERICOLACE) 8.6-50 MG tablet Take 1 tablet by mouth 2 times daily (and 1 tablet twice daily as needed) Past Week at Unknown time Yes Reported, Patient   VITAMIN D3 25 MCG (1000 UT) tablet TAKE 2 TABLETS (2000IU) BY MOUTH EVERY DAY DX OSTEOPOROSIS Past Week at Unknown time Yes Lisa Parry APRN CNP   Zinc Oxide 13 % CREA Apply topically daily    Reported, Patient       Medication history completed by:   Clem Ramirez, PharmD, BCPS

## 2020-12-24 NOTE — CONSULTS
[facility]  Palliative Care Consultation Note    Patient: Mehreen Camara  Date of Admission:  12/23/2020    Requesting Clinician / Team:Trauma  Reason for consult: Pain management  Symptom management  Goals of care  Patient and family support    Recommendations:    For pain: Very significant pain.  Delirious in the context of receiving large doses of opiates (appx the equivalent 75 oral morphine equivalents in 16 hours).   Seems quite difficult to balance pain control and side effets   I would recommend increasing Tylenol to 975 mg bid.   Scheduled ketorolac (with good hydration), may also be helpful (e.g. 15 mg up to qid).   We understand that bracing, surgery, or otherwise immobilizing is unfortunately not possible.   Gabapentin is reasonable, but of course can contribute to AMS in the elderly as well, would keep for now.   Unfortunately, few non-opiate options that I can think of which would be likely to be really helpful, and not very likely to be a good candidate for a nerve block.   I would suggest using small doses of opiates (morphine solution 2.5-5mg q4h prn).     Ultimately, I think that methadone may be best choice for her to give best balance of pain relief with what hopefully is not contribution side effects.   I will order an EKG.    If QTc not prolonged would consider starting methadone 2.5 mg bid, which I hope would be a good balance for her.  Would take several days to reach steady state.    Could order today, or see if pain control and  balance acceptable without it for a day.   Goal is tolerable pain; will not likely be able to be pain free (at least not without very significant side effects)      Goals of care: In context of frailty, multiple fractures/ hospitalizations, and knowing that ability to rehabilitate in TCU is likely to be limited, spoke with daughter Eladio today who is talking with 4 siblings about hospice, potentially at home  versus in a facility.  Has not yet decided, but seems that family has discussed this even prior to this stay.   Some information provide..   Eladio plans to call in next 1-2 days.   Voices it would be helpful if she could potentially visit her mother face to face.  I spoke with the 6b charge Veronica who will make this request.  We do have a palliative doctor on call (Dr Reyna this weekend) who could speak with family if they have further questions.      Delirium, likely multifactorial, including age, hospitalization, recent trauma, frailty/ medical conditions, opiates (seems to be a temporal relationships), poor sleep, ectc.   Under treated pain of course can contribute to delirium.   Does seem to have receive high doses of opiates, so certainly possible that the doses rather than using the drums themselves were a major factor  Treatment would of course be frequent orientation, avoiding contributing problems as able, treating pain, etc.   If a sitter were available this would be helpful.  Will order bladder scan (can sometimes contribute if urinary retention present, and is having pelvic pain).    Using prn olanzapine for agitation is quite reasonable, though might switch to haldol if this makes her sedated during the day.   Unfortunately I do not think these types of medications help delirium itself, but may be helpful for safety and agitation.       These recommendations have been discussed with MARK Hines from the trauma team      Thank you for the opportunity to participate in the care of this patient and family. Our team: will continue to follow.     During regular M-F work hours -- if you are not sure who specifically to contact -- please contact us by sending a text page to our team consult pager at [phone].    After regular work hours and on weekends/holidays, you can call our answering service at [phone]. Also, who's on call for us is available in [employer].       Assessments:  Mehreen OBRIEN  Jax is a 80 year old female   Today, the patient was seen for:  Frailty  Right sacral ala and Right pubic ramus fractures  Possible Pelvic edema  Probable hemorrhage (space of Retzius)  Delirium  Goals of care  Consideration of hospice.      Prognosis, Goals, & Planning:      Functional Status just prior to hospitalization: 2 (Ambulatory and capable of all selfcare but unable to carry out any work activities; may need help with IADLs up and about > 50% of waking hours)      Prognosis, Goals, and/or Advance Care Planning were addressed today: Yes        Summary/Comments:       Patient's decision making preferences: unable to assess          Patient has decision-making capacity today for complex decisions: No            I have concerns about the patient/family's health literacy today: No           Patient has a completed Health Care Directive: Yes, and on file.   Advanced directive from 2015:    HCA is daughter Alysa (daughter).   Another daughter Irasema is listed as alternate  Says had made wishes clear to daughter    POLST shows DNR and DNI, selective treatment.  Abx okay.   Selective trial of feeding tube      Code status: No CPR / No Intubation.    Numerous previous DNR/DNIcode status    Coping, Meaning, & Spirituality:   Mood, coping, and/or meaning in the context of serious illness were addressed today: Yes  Summary/Comments:     Social:     Living situation: In assisted living.  2 TCU stays in last 4 months    Key family / caregivers: Numerous children, family members    Current in-home services: Assisted living.   Appears to have fairly limited services.     History of Present Illness:  History gathered today from: patient, family/loved ones, medical chart    Mehreen Camara is an 80-year-old woman who lives in assisted living and recently discharged from the TCU unfortunately presented with a another fracture.  She has had several fractures and TCU was in the last several months notably during a  August 2020 and October 2020 hospitalization.   Had just discharged from rehab the same day to her assisted living and apparently had a fall landing on her hip.  A CT of her pelvis showed numerous fractures and fluid in her pelvis (see below) seems per notes to been reasonably oriented on admission but became more confused throughout.  Staff has noted what seems to be a temporal relationship with opiate doses she was given (did receive number of doses yesterday).  We are consulted to see if we could better control her pain and whether hospice would be an appropriate option for her.    Patient unable unfortunately to engage with history, is disoriented to place and situation.  Has been notably quite agitated with staff and frustrated with her disorientation.  A number of her pain medications have been stopped due to this this morning.  I discussed with her healthcare surrogate Eladio (daughter).  She notes that it just does not seem that the patient is likely benefit from another try at the TCU if this is even possible.  She thinks that pain is most important for her mom.  Hospice is something that she thinks should be considered but she wishes to discuss with her siblings today.  She notes that other providers have you had discussions about hospice before and so this has been thought about in some Dr. Whitney.    [facility] Symptom Data:  # Pain severity the last 12 hours: moderate  # Dyspnea severity the last 12 hours: none  # Nausea severity the last 12 hours: none  # Anxiety severity the last 12 hours: none    Patient is on opioids: assessed and bowels ok/no needed changes to plan of care today.    ROS:  Comprehensive ROS is reviewed and is negative except as here & per HPI:      Past Medical History:  Past Medical History:   Diagnosis Date     Adrenal adenoma     stable, thought not to be hormonally active     Arthritis      Depressive disorder      Hypertension      Multiple system atrophy P (H)       Rheumatic fever     thought to have had as a child     Small bowel obstruction (H)      Thyroid disease         Past Surgical History:  Past Surgical History:   Procedure Laterality Date     AAA REPAIR  2015    wtih bifurcation graft     CARPAL TUNNEL RELEASE RT/LT Bilateral 2013     HYSTERECTOMY  2013     JOINT REPLACEMENT Right 2003     LAPAROSCOPIC CHOLECYSTECTOMY N/A 12/12/2018    Procedure: LAPAROSCOPIC CHOLECYSTECTOMY;  Surgeon: Amadeo Sebastian MD;  Location: [facility]     ORTHOPEDIC SURGERY       SPINE SURGERY  1981    cervical spine fusion     THYROIDECTOMY  2011         Family History:  Family History   Problem Relation Age of Onset     Hypertension Mother      Coronary Artery Disease Mother      Coronary Artery Disease Father      Other Cancer Brother      Parkinsonism Other          Allergies:  Allergies   Allergen Reactions     Penicillins Anaphylaxis, Rash and Shortness Of Breath     Aspirin Nausea and GI Disturbance     Atenolol Cough     Atorvastatin Muscle Pain (Myalgia) and Nausea and Vomiting     Bupropion Nausea     Clindamycin Other (See Comments)     Constipation      Codeine Nausea     Ibuprofen Nausea     Stomach upset     Lovastatin Muscle Pain (Myalgia)     Cephalexin Rash     Neck reddness        Medications:  I have reviewed this patient's medication profile and medications from this hospitalization.   Noted:  Gabapentin 100 mg 3 times daily (ordered at 9 AM)  Tylenol 975 mg 3 times daily (ordered at 9 AM)  Senna 1-2 tabs twice daily    Given 1 dose of Zyprexa 5 mg today    Ketorolac 15 mg IV x1 yesterday at 3 PM  Robaxin 500 mg twice daily, first given at 11 AM    Hydromorphone 0.2 mg IV as needed (given x1 at 7 AM, 0.3 mg given at 4 AM, 0.5 given last night at 10 PM yesterday, 0.5 at 8:50 PM yesterday, 0.5 at 830 yesterday): (50 total OME)  Morphine 4 mg IV x1 at 3 PM yesterday (12 OME)  Oxycodone 10 mg x 1 yesterday at 7 PM (15 OME)  Ultram 50 mg x 1 this AM    TOTAL OPIATES (16 hours):  appx 75 OME    Flexeril 5 mg 3 times daily as needed  As needed:  Lidocaine cream  IcyHot patch  Zyprexa as needed agitation  Zofran      Home med: SINEMET    Lidocaine patch ordered this morning  IcyHot patch ordered this morning treatment    Physical Exam:  Vital Signs: Temp: 98  F (36.7  C) Temp src: Oral BP: (!) 185/104 Pulse: 78   Resp: 20 SpO2: 95 % O2 Device: None (Room air) Oxygen Delivery: 2 LPM  Weight: 163 lbs 2.25 oz  Neuro: PERRLA. EOMI. CN II-XII grossly intact. No focal deficits. Strength 5/5 x 4 extremities.  Sensation intact.  Head: atraumatic, normocephalic, trachea midline  Eyes:  Pupils 3 mm, EOMI, corneas and conjunctivae clear  Ears: pearly grey bilateral TMs and non-inflamed external ear canals  Nose: nares patent, no drainage, nasal septum non-tender  Mouth/Throat: no exudates or erythema,  no dental tenderness or malocclusions, no tongue lacerations  Neck: no cervical collar present. No midline posterior tenderness, full AROM without pain.   Chest/Pulmonary: normal respiratory rate and rhythm,  bilateral clear breath sounds, no wheezes, rales or rhonchi, no chest wall tenderness or deformities,   Cardiovascular: S1, S2,  normal and regular rate and rhythm, no murmurs  Abdomen: soft, non-tender, no guarding, no rebound tenderness and no tenderness to palpation  : normal external genitalia, pelvis stable to lateral compression, no bravo, no urine assess/urine yellow and clear  Musculoskel/Extremities: Pain in right pelvic region, otherwise normal extremities, full AROM of major joints without tenderness, edema, erythema, ecchymosis, or abrasions. PP. Moderate edema of lower extremities.  Back/Spine: no deformity, no midline tenderness, no sacral tenderness, no step-offs and no abrasions or contusions  Hands: no gross deformities of hands or fingers. Full AROM of hand and fingers in flexion and extension.  strength equal and symmetric.   Psychiatric: affect/mood normal, cooperative,  normal judgement/insight and memory intact  Skin: no rashes, laceration, ecchymosis, skin warm and dry    Data reviewed:  Recent imaging reviewed, my comments on pertinents:       Pelvic CT:  IMPRESSION:  1.  Acute fractures of the right sacral ala, right superior pubic  ramus-body, right inferior pubic ramus-body, left superior pubic  ramus, and left inferior pubic ramus.  2.  Old compression fractures of the L4 and L5 vertebral bodies,  unchanged.  3.  Intact right total hip arthroplasty.  4.  Edema-hemorrhage in the space of Retzius.    L-spine CT:  1. 5 lumbar type vertebrae. Moderate left convex curvature of the  lumbar spine centered at L3. Mild degenerative left lateral listhesis  of C3 upon C4. Alignment otherwise normal. Degenerative Schmorl's node  deformities in the opposing endplates at L3-L4 and of the superior and  inferior endplates of L5 again noted. No fractures. Severe facet  arthropathy throughout the lumbar spine again noted. Moderate  degenerative spinal canal stenosis at L3-L4 again noted.  2. Partial visualization of recent-appearing fractures of the right  sacral ala. Pelvis CT recommended for further evaluation.     T-spine:   Right convex curvature of the thoracic spine centered at  approximately T10 again noted. Alignment of the thoracic vertebrae  otherwise normal. Vertebral body heights are normal. No recent  fractures. Probable chronic healed fracture through the left  transverse process of T1. No bony spinal canal stenosis of the  thoracic spine.    Cervical:  Minimal degenerative anterolisthesis of C7 upon T1 again  noted. Alignment of the cervical vertebrae is otherwise normal;  however, there is reversal of normal cervical lordosis centered at the  C4 level. Vertebral body heights of the cervical spine are normal.  Craniocervical alignment is normal. There are no fractures of the  cervical spine. Mild-moderate facet arthropathy throughout the  cervical spine. Degenerative endplate  spurring at C4-C5 again noted.  No significant spinal canal stenosis of the cervical spine. No  prevertebral soft tissue swelling.    CT Head:                                                                     IMPRESSION: Diffuse cerebral volume loss and cerebral white matter  changes consistent with chronic small vessel ischemic disease. No  evidence for acute intracranial pathology.     Recent lab data reviewed, my comments on pertinents:   Normal creatinine, normal BMP this morning  Protein 6.6, albumin 3.1, other LFTs normal  Hemoglobin 12  INR 1.04    Greater than 110 minutes on care patient including 15 face-to-face and 20 on phone with daughter Eladio, and additional 10 coordination of care.  This was for patient's frailty, recurrent fractures, multiple fractures, pain, delirium, goals of care, and consideration of hospice

## 2020-12-24 NOTE — ED NOTES
EMS here, report to crew, update provided to [facility]. Pt en route at this time.

## 2020-12-24 NOTE — ED PROVIDER NOTES
[place] EMERGENCY DEPARTMENT ([facility])  December 23, 2020      ED Provider Note  [facility]      History     Chief Complaint   Patient presents with     Fall     HPI  Mehreen Camara is a 80 year old female who presents after a fall.  Patient reports that she tripped over her walker in her nursing home and landed on her hip.  She was taken to [facility] where she was found to have multiple pelvic fractures.  No other injuries identified.  Patient was transferred here for further care.  Orthopedic surgery was consulted prior to arrival and deemed her injuries nonoperative.  Patient has been unable to ambulate since the fall.  Denies head injury or loss of consciousness.  Patient does not take blood thinners.    Past Medical History  Past Medical History:   Diagnosis Date     Adrenal adenoma     stable, thought not to be hormonally active     Arthritis      Depressive disorder      Hypertension      Multiple system atrophy P (H)      Rheumatic fever     thought to have had as a child     Small bowel obstruction (H)      Thyroid disease      Past Surgical History:   Procedure Laterality Date     AAA REPAIR  2015    wtih bifurcation graft     CARPAL TUNNEL RELEASE RT/LT Bilateral 2013     HYSTERECTOMY  2013     JOINT REPLACEMENT Right 2003     LAPAROSCOPIC CHOLECYSTECTOMY N/A 12/12/2018    Procedure: LAPAROSCOPIC CHOLECYSTECTOMY;  Surgeon: Amadeo Sebastian MD;  Location: [place] OR     ORTHOPEDIC SURGERY       SPINE SURGERY  1981    cervical spine fusion     THYROIDECTOMY  2011          acetaminophen (TYLENOL) 500 MG tablet       bisacodyl (DULCOLAX) 10 MG suppository       carbidopa-levodopa (SINEMET)  MG tablet       clotrimazole (LOTRIMIN) 1 % external cream       diclofenac (VOLTAREN) 1 % topical gel       DULoxetine (CYMBALTA) 20 MG capsule       gabapentin (NEURONTIN) 100 MG capsule       hydrochlorothiazide (HYDRODIURIL) 25 MG tablet        levothyroxine (SYNTHROID/LEVOTHROID) 100 MCG tablet       losartan (COZAAR) 50 MG tablet       oxyCODONE (ROXICODONE) 5 MG tablet       Polyethylene Glycol 3350 (MIRALAX PO)       polyethylene glycol-propylene glycol (SYSTANE ULTRA) 0.4-0.3 % SOLN ophthalmic solution       QUEtiapine (SEROQUEL) 25 MG tablet       senna-docusate (SENOKOT-S/PERICOLACE) 8.6-50 MG tablet       VITAMIN D3 25 MCG (1000 UT) tablet       Zinc Oxide 13 % CREA      Allergies   Allergen Reactions     Penicillins Anaphylaxis, Rash and Shortness Of Breath     Aspirin Nausea and GI Disturbance     Atenolol Cough     Atorvastatin Muscle Pain (Myalgia) and Nausea and Vomiting     Bupropion Nausea     Clindamycin Other (See Comments)     Constipation      Codeine Nausea     Ibuprofen Nausea     Stomach upset     Lovastatin Muscle Pain (Myalgia)     Cephalexin Rash     Neck reddness     Family History  Family History   Problem Relation Age of Onset     Hypertension Mother      Coronary Artery Disease Mother      Coronary Artery Disease Father      Other Cancer Brother      Parkinsonism Other      Social History   Social History     Tobacco Use     Smoking status: Former Smoker     Packs/day: 0.50     Types: Cigarettes     Quit date: 1994     Years since quittin.0     Smokeless tobacco: Never Used   Substance Use Topics     Alcohol use: No     Drug use: No      Past medical history, past surgical history, medications, allergies, family history, and social history were reviewed with the patient. No additional pertinent items.       Review of Systems  A complete review of systems was performed with pertinent positives and negatives noted in the HPI, and all other systems negative.    Physical Exam   BP: (!) 149/77  Pulse: 78  Temp: 97.8  F (36.6  C)  Resp: 18  Weight: 68 kg (150 lb)  SpO2: 96 %  Physical Exam  Vitals signs and nursing note reviewed.   Constitutional:       General: She is not in acute distress.     Appearance: Normal  appearance. She is not diaphoretic.   HENT:      Head: Normocephalic.      Comments: Old appearing (yellow-green colored) bruise over the left eyebrow.     Mouth/Throat:      Pharynx: No oropharyngeal exudate.   Eyes:      General: No scleral icterus.     Pupils: Pupils are equal, round, and reactive to light.   Neck:      Musculoskeletal: Neck supple.   Cardiovascular:      Rate and Rhythm: Normal rate and regular rhythm.      Heart sounds: Normal heart sounds.   Pulmonary:      Effort: No respiratory distress.      Breath sounds: Normal breath sounds.   Abdominal:      General: Bowel sounds are normal.      Palpations: Abdomen is soft.      Tenderness: There is no abdominal tenderness.   Musculoskeletal:         General: Signs of injury (pelvis) present. No tenderness or deformity.   Skin:     General: Skin is warm.      Capillary Refill: Capillary refill takes less than 2 seconds.      Findings: No rash.   Neurological:      General: No focal deficit present.      Mental Status: She is alert and oriented to person, place, and time.   Psychiatric:         Mood and Affect: Mood normal.         Behavior: Behavior normal.         ED Course      Procedures       Results for orders placed or performed during the hospital encounter of 12/23/20   CT Head w/o Contrast     Status: None    Narrative    CT OF THE HEAD WITHOUT CONTRAST 12/23/2020 4:39 PM     COMPARISON: Head CT 10/10/2020    HISTORY: Head trauma, minor, GCS>=13, high clinical risk, initial  exam.    TECHNIQUE: 5 mm thick axial CT images of the head were acquired  without IV contrast material.    FINDINGS: There is mild diffuse cerebral volume loss. There are subtle  patchy areas of decreased density in the cerebral white matter  bilaterally that are consistent with sequela of chronic small vessel  ischemic disease.    The ventricles and basal cisterns are within normal limits in  configuration given the degree of cerebral volume loss.  There is no  midline  shift. There are no extra-axial fluid collections.    No intracranial hemorrhage, mass or recent infarct.    The visualized paranasal sinuses are well-aerated. There is no  mastoiditis. There are no fractures of the visualized bones. Tiny left  forehead scalp lipoma again noted.      Impression    IMPRESSION: Diffuse cerebral volume loss and cerebral white matter  changes consistent with chronic small vessel ischemic disease. No  evidence for acute intracranial pathology.        Radiation dose for this scan was reduced using automated exposure  control, adjustment of the mA and/or kV according to patient size, or  iterative reconstruction technique    LEI PEREIRA MD   Cervical spine CT w/o contrast     Status: None    Narrative    CT OF THE CERVICAL SPINE WITHOUT CONTRAST   12/23/2020 4:39 PM     COMPARISON: CT cervical spine 10/10/2020    HISTORY: C-spine trauma, high clinical risk (NEXUS/CCR); fall from  standing.     TECHNIQUE: Axial images of the cervical spine were acquired without  intravenous contrast. Multiplanar reformations were created.        Impression    IMPRESSION: Minimal degenerative anterolisthesis of C7 upon T1 again  noted. Alignment of the cervical vertebrae is otherwise normal;  however, there is reversal of normal cervical lordosis centered at the  C4 level. Vertebral body heights of the cervical spine are normal.  Craniocervical alignment is normal. There are no fractures of the  cervical spine. Mild-moderate facet arthropathy throughout the  cervical spine. Degenerative endplate spurring at C4-C5 again noted.  No significant spinal canal stenosis of the cervical spine. No  prevertebral soft tissue swelling.      Radiation dose for this scan was reduced using automated exposure  control, adjustment of the mA and/or kV according to patient size, or  iterative reconstruction technique    LEI PEREIRA MD   CT Thoracic Spine w/o Contrast     Status: None    Narrative    CT OF THE THORACIC  SPINE WITHOUT CONTRAST   12/23/2020 4:40 PM     COMPARISON: Thoracic spine x-ray series 4/6/2020    HISTORY: Thoracolumbar spine trauma, minor-moderate, low back pain.     TECHNIQUE: Axial CT images of the thoracic spine were acquired without  intravenous contrast. Multiplanar reformations were created from the  axial source images.        Impression    IMPRESSION:  Right convex curvature of the thoracic spine centered at  approximately T10 again noted. Alignment of the thoracic vertebrae  otherwise normal. Vertebral body heights are normal. No recent  fractures. Probable chronic healed fracture through the left  transverse process of T1. No bony spinal canal stenosis of the  thoracic spine.      Radiation dose for this scan was reduced using automated exposure  control, adjustment of the mA and/or kV according to patient size, or  iterative reconstruction technique    LEI PEREIRA MD   Lumbar spine CT w/o contrast     Status: None    Narrative    CT OF THE LUMBAR SPINE WITHOUT CONTRAST  12/23/2020 4:42 PM     COMPARISON: CT lumbar spine 10/10/2020    HISTORY: Thoracolumbar spine trauma, minor-moderate, low back pain.     TECHNIQUE: Axial images of the lumbar spine were acquired without  intravenous contrast. Multiplanar reformations were created from the  axial source images.      FINDINGS:    1. 5 lumbar type vertebrae. Moderate left convex curvature of the  lumbar spine centered at L3. Mild degenerative left lateral listhesis  of C3 upon C4. Alignment otherwise normal. Degenerative Schmorl's node  deformities in the opposing endplates at L3-L4 and of the superior and  inferior endplates of L5 again noted. No fractures. Severe facet  arthropathy throughout the lumbar spine again noted. Moderate  degenerative spinal canal stenosis at L3-L4 again noted.  2. Partial visualization of recent-appearing fractures of the right  sacral ala. Pelvis CT recommended for further evaluation.      Radiation dose for this scan  was reduced using automated exposure  control, adjustment of the mA and/or kV according to patient size, or  iterative reconstruction technique    LIE PEREIRA MD   CT Pelvis Bone wo Contrast     Status: None    Narrative    CT PELVIS BONE WITHOUT CONTRAST 12/23/2020 4:44 PM    INDICATION: 80-year-old patient with pelvic trauma and concern for  fracture.    COMPARISON: 10/10/2020 CT.    TECHNIQUE: Noncontrast. Axial, sagittal and coronal thin-section  reconstruction. Dose reduction techniques were used.     CONTRAST: None.    FINDINGS: Mildly comminuted and minimally displaced longitudinal  oblique fracture of the right sacral ala. Oblique mildly displaced  fracture of the right superior pubic ramus-body. Oblique minimally  displaced fracture of the right inferior pubic ramus-body. Minimally  displaced fracture of the left superior pubic ramus. Minimally  displaced fracture of the left inferior pubic ramus.    No additional acute fractures. Compression of the superior endplates  of the L4 and L5 vertebral bodies (unchanged). Degenerative disc  disease and facet arthropathy in the lower lumbar spine. Moderate  bilateral sacroiliac degenerative arthrosis. Right total hip  arthroplasty; the components are well seated without evidence of  complication. Mild left hip degenerative arthrosis. Probable  postoperative change in the right iliac wing. Bone demineralization.    No asymmetric muscle atrophy or enlargement. Soft tissue  thickening-stranding in the prevesicular space (space of Retzius). No  free fluid in the pelvis. Moderate stool volume. Aortobiiliac vascular  stent. Atherosclerotic calcification.      Impression    IMPRESSION:  1.  Acute fractures of the right sacral ala, right superior pubic  ramus-body, right inferior pubic ramus-body, left superior pubic  ramus, and left inferior pubic ramus.  2.  Old compression fractures of the L4 and L5 vertebral bodies,  unchanged.  3.  Intact right total hip  arthroplasty.  4.  Edema-hemorrhage in the space of Retzius.    JAYNA MOBLEY MD   Asymptomatic SARS-CoV-2 COVID-19 Virus (Coronavirus) by PCR     Status: None    Specimen: Nasopharyngeal   Result Value Ref Range    SARS-CoV-2 Virus Specimen Source Nasopharyngeal     SARS-CoV-2 PCR Result NEGATIVE     SARS-CoV-2 PCR Comment (Note)      Medications   HYDROmorphone (PF) (DILAUDID) injection 0.5 mg (0.5 mg Intravenous Given 12/23/20 5670)        Assessments & Plan (with Medical Decision Making)   Patient was seen and examined.  Imaging from [facility] was reviewed.  I discussed the case with trauma surgery who will admit the patient to their service for pain control and physical therapy.  Orthopedic surgery also consulted here.  Pain controlled with IV Dilaudid.  She remained hemodynamically stable while in the emergency department.    I have reviewed the nursing notes. I have reviewed the findings, diagnosis, plan and need for follow up with the patient.    New Prescriptions    No medications on file       Final diagnoses:   Fall from standing, initial encounter   Multiple closed fractures of pelvis without disruption of pelvic ring, initial encounter (H)       --  Yamilka SALINAS [facility] EMERGENCY DEPARTMENT  12/23/2020     Yamilka Vásquez DO  12/23/20 4711

## 2020-12-24 NOTE — PLAN OF CARE
"Neuro: pt continue to be confused , agitated and pulling lines and wanted to \" go home\" .  She also refused most of her cares including tele monitoring. Provider notified and came over to see pt. Zyprexa given and pt responded and was calmer .   Cardiac: VSS    Respiratory: Sating 98% on RA.  GI/: purewick intact with adequate urine output.  Diet/appetite: Tolerating 75% of her diet. Eating well. Need meal set up.   Activity: bedrest , and turn and repositions herself.  Pain: At acceptable level on current regimen.   Skin: No new deficits noted.  LDA's: pt pulled her PIV out and refused and to be place in at this time.   Plan: bed alarm is on and functioning. Continue with POC. Notify primary team with changes.   "

## 2020-12-24 NOTE — PROGRESS NOTES
Admission          12/24/2020 6:00 AM  -----------------------------------------------------------  Reason for admission: fall/pelvic fracture  Primary team notified of pt arrival.  Admitted from: ED  Via: stretcher  Accompanied by: self  Belongings: Placed in closet; valuables sent home with family  Admission Profile: PENDING  Teaching: orientation to unit and call light- call light within reach, call don't fall, use of console, meal times, when to call for the RN, and enforced importance of safety   Access: PIV  Telemetry: Placed on pt  Ht./Wt.: complete  Code Status verified on armband: yes  2 RN Skin Assessment Completed By: Usama Huang RN & Paula Graves RN  Med Rec completed: PENDING  Bed surface reassessed with algorithm and charted: PENDING  New bed surface ordered: PENDING    Pt status: stable    Temp:  [97.6  F (36.4  C)-98.1  F (36.7  C)] 98.1  F (36.7  C)  Pulse:  [71-93] 75  Resp:  [6-61] 20  BP: (124-226)/() 168/99  SpO2:  [88 %-100 %] 98 %

## 2020-12-25 ENCOUNTER — APPOINTMENT (OUTPATIENT)
Dept: GENERAL RADIOLOGY | Facility: CLINIC | Age: 80
DRG: 536 | End: 2020-12-25
Payer: COMMERCIAL

## 2020-12-25 LAB
ANION GAP SERPL CALCULATED.3IONS-SCNC: 4 MMOL/L (ref 3–14)
BUN SERPL-MCNC: 18 MG/DL (ref 7–30)
CALCIUM SERPL-MCNC: 8.6 MG/DL (ref 8.5–10.1)
CHLORIDE SERPL-SCNC: 105 MMOL/L (ref 94–109)
CO2 SERPL-SCNC: 31 MMOL/L (ref 20–32)
CREAT SERPL-MCNC: 0.51 MG/DL (ref 0.52–1.04)
ERYTHROCYTE [DISTWIDTH] IN BLOOD BY AUTOMATED COUNT: 12.4 % (ref 10–15)
GFR SERPL CREATININE-BSD FRML MDRD: >90 ML/MIN/{1.73_M2}
GLUCOSE SERPL-MCNC: 88 MG/DL (ref 70–99)
HCT VFR BLD AUTO: 38.9 % (ref 35–47)
HGB BLD-MCNC: 12.1 G/DL (ref 11.7–15.7)
INTERPRETATION ECG - MUSE: NORMAL
MCH RBC QN AUTO: 31.3 PG (ref 26.5–33)
MCHC RBC AUTO-ENTMCNC: 31.1 G/DL (ref 31.5–36.5)
MCV RBC AUTO: 101 FL (ref 78–100)
PLATELET # BLD AUTO: 134 10E9/L (ref 150–450)
POTASSIUM SERPL-SCNC: 3.8 MMOL/L (ref 3.4–5.3)
RBC # BLD AUTO: 3.87 10E12/L (ref 3.8–5.2)
SODIUM SERPL-SCNC: 139 MMOL/L (ref 133–144)
WBC # BLD AUTO: 6.4 10E9/L (ref 4–11)

## 2020-12-25 PROCEDURE — 250N000013 HC RX MED GY IP 250 OP 250 PS 637: Performed by: NURSE PRACTITIONER

## 2020-12-25 PROCEDURE — 85027 COMPLETE CBC AUTOMATED: CPT | Performed by: NURSE PRACTITIONER

## 2020-12-25 PROCEDURE — 250N000013 HC RX MED GY IP 250 OP 250 PS 637: Performed by: SURGERY

## 2020-12-25 PROCEDURE — 72170 X-RAY EXAM OF PELVIS: CPT | Mod: 26 | Performed by: RADIOLOGY

## 2020-12-25 PROCEDURE — 72170 X-RAY EXAM OF PELVIS: CPT

## 2020-12-25 PROCEDURE — 120N000002 HC R&B MED SURG/OB UMMC

## 2020-12-25 PROCEDURE — 80048 BASIC METABOLIC PNL TOTAL CA: CPT | Performed by: NURSE PRACTITIONER

## 2020-12-25 PROCEDURE — 99233 SBSQ HOSP IP/OBS HIGH 50: CPT | Performed by: NURSE PRACTITIONER

## 2020-12-25 PROCEDURE — 36415 COLL VENOUS BLD VENIPUNCTURE: CPT | Performed by: NURSE PRACTITIONER

## 2020-12-25 PROCEDURE — 250N000011 HC RX IP 250 OP 636: Performed by: NURSE PRACTITIONER

## 2020-12-25 RX ORDER — POLYETHYLENE GLYCOL 3350 17 G/17G
17 POWDER, FOR SOLUTION ORAL DAILY
Status: DISCONTINUED | OUTPATIENT
Start: 2020-12-25 | End: 2020-12-30 | Stop reason: HOSPADM

## 2020-12-25 RX ORDER — LANOLIN ALCOHOL/MO/W.PET/CERES
3 CREAM (GRAM) TOPICAL EVERY 24 HOURS
Status: DISCONTINUED | OUTPATIENT
Start: 2020-12-25 | End: 2020-12-30 | Stop reason: HOSPADM

## 2020-12-25 RX ORDER — BISACODYL 10 MG
10 SUPPOSITORY, RECTAL RECTAL DAILY
Status: DISCONTINUED | OUTPATIENT
Start: 2020-12-25 | End: 2020-12-29

## 2020-12-25 RX ORDER — POTASSIUM CHLORIDE 7.45 MG/ML
10 INJECTION INTRAVENOUS
Status: DISCONTINUED | OUTPATIENT
Start: 2020-12-25 | End: 2020-12-25

## 2020-12-25 RX ORDER — POTASSIUM CHLORIDE 1.5 G/1.58G
20 POWDER, FOR SOLUTION ORAL ONCE
Status: COMPLETED | OUTPATIENT
Start: 2020-12-25 | End: 2020-12-25

## 2020-12-25 RX ADMIN — ACETAMINOPHEN 975 MG: 325 TABLET, FILM COATED ORAL at 16:54

## 2020-12-25 RX ADMIN — MELATONIN TAB 3 MG 3 MG: 3 TAB at 19:31

## 2020-12-25 RX ADMIN — DULOXETINE 60 MG: 20 CAPSULE, DELAYED RELEASE ORAL at 13:43

## 2020-12-25 RX ADMIN — DOCUSATE SODIUM AND SENNOSIDES 1 TABLET: 8.6; 5 TABLET, FILM COATED ORAL at 19:31

## 2020-12-25 RX ADMIN — DICLOFENAC SODIUM 2 G: 10 GEL TOPICAL at 13:45

## 2020-12-25 RX ADMIN — QUETIAPINE FUMARATE 25 MG: 25 TABLET ORAL at 21:22

## 2020-12-25 RX ADMIN — CARBOXYMETHYLCELLULOSE SODIUM 2 DROP: 5 SOLUTION/ DROPS OPHTHALMIC at 13:49

## 2020-12-25 RX ADMIN — CARBIDOPA AND LEVODOPA 1 TABLET: 25; 100 TABLET ORAL at 19:31

## 2020-12-25 RX ADMIN — CARBIDOPA AND LEVODOPA 1 TABLET: 25; 100 TABLET ORAL at 10:17

## 2020-12-25 RX ADMIN — CARBOXYMETHYLCELLULOSE SODIUM 2 DROP: 5 SOLUTION/ DROPS OPHTHALMIC at 19:32

## 2020-12-25 RX ADMIN — CARBIDOPA AND LEVODOPA 1 TABLET: 25; 100 TABLET ORAL at 13:44

## 2020-12-25 RX ADMIN — GABAPENTIN 800 MG: 400 CAPSULE ORAL at 21:22

## 2020-12-25 RX ADMIN — LOSARTAN POTASSIUM 50 MG: 50 TABLET, FILM COATED ORAL at 13:43

## 2020-12-25 RX ADMIN — ENOXAPARIN SODIUM 30 MG: 30 INJECTION SUBCUTANEOUS at 16:53

## 2020-12-25 RX ADMIN — HYDROCHLOROTHIAZIDE 25 MG: 25 TABLET ORAL at 10:17

## 2020-12-25 RX ADMIN — Medication 50 MCG: at 13:43

## 2020-12-25 RX ADMIN — ACETAMINOPHEN 975 MG: 325 TABLET, FILM COATED ORAL at 21:22

## 2020-12-25 RX ADMIN — LIDOCAINE 2 PATCH: 560 PATCH PERCUTANEOUS; TOPICAL; TRANSDERMAL at 09:56

## 2020-12-25 RX ADMIN — IBUPROFEN 400 MG: 400 TABLET ORAL at 13:44

## 2020-12-25 RX ADMIN — POTASSIUM CHLORIDE 20 MEQ: 1.5 POWDER, FOR SOLUTION ORAL at 13:44

## 2020-12-25 RX ADMIN — DICLOFENAC SODIUM 2 G: 10 GEL TOPICAL at 09:58

## 2020-12-25 RX ADMIN — ACETAMINOPHEN 975 MG: 325 TABLET, FILM COATED ORAL at 10:18

## 2020-12-25 RX ADMIN — GABAPENTIN 400 MG: 400 CAPSULE ORAL at 13:44

## 2020-12-25 RX ADMIN — GABAPENTIN 400 MG: 400 CAPSULE ORAL at 10:17

## 2020-12-25 RX ADMIN — ENOXAPARIN SODIUM 30 MG: 30 INJECTION SUBCUTANEOUS at 04:22

## 2020-12-25 RX ADMIN — POLYETHYLENE GLYCOL 3350 17 G: 17 POWDER, FOR SOLUTION ORAL at 16:54

## 2020-12-25 RX ADMIN — DICLOFENAC SODIUM 2 G: 10 GEL TOPICAL at 17:29

## 2020-12-25 RX ADMIN — IBUPROFEN 400 MG: 400 TABLET ORAL at 17:29

## 2020-12-25 RX ADMIN — BISACODYL 10 MG: 10 SUPPOSITORY RECTAL at 17:30

## 2020-12-25 RX ADMIN — LEVOTHYROXINE SODIUM 100 MCG: 100 TABLET ORAL at 10:17

## 2020-12-25 RX ADMIN — DICLOFENAC SODIUM 2 G: 10 GEL TOPICAL at 19:33

## 2020-12-25 RX ADMIN — IBUPROFEN 400 MG: 400 TABLET ORAL at 10:18

## 2020-12-25 ASSESSMENT — ACTIVITIES OF DAILY LIVING (ADL)
ADLS_ACUITY_SCORE: 22
ADLS_ACUITY_SCORE: 22
ADLS_ACUITY_SCORE: 24
ADLS_ACUITY_SCORE: 20

## 2020-12-25 ASSESSMENT — MIFFLIN-ST. JEOR: SCORE: 1218.75

## 2020-12-25 NOTE — PROGRESS NOTES
[facility]   Trauma Service Progress Note    Date of Service (when I saw the patient): 12/25/2020  Assessment & Plan Trauma mechanism: Fall, unable to do true tertiary 2nd to delirium  Time/date of injury: 12/23/2020   Called daughter Eladio at 3:12 no answer at home or mobile number     Known Injuries:  1.  Right sacral ala and Right pubic ramus fractures  2.  Pelvic edema/hemorrhage (space of Retzius)     Other diagnoses:  1.  Hypertension  2.  Adrenal adenoma  3.  Depressive disorder  4.  Arthritis  5.  Dementia?     Neuro/Pain:  # Encephalopathy, unknown etiology, likely opioid induced  # Baseline dementia?  # Acute pain  With chronic pain  - Monitor neurological status. Delirium prevention and precautions.   - Add haldol IV or Zyprexa IM PRN-- avoid if able  - Takes gabapentin at home 300 mg bid PLUS 600 mg at night , will increase to 400 mg bid and 800mg q hs  - Low dose scheduled NSAIDS, discussed at length with palliative, best option for now  - Continue topicals lidoderm/menthol  - Continue home diclofenac  - Also takes oxy at home, hold for now  - Home seroquel started at increased bedtime dose.  Will add PRN dose as well  - Do not restart robaxin  - Add melatonin    # Depression and Dementia  -- Continue home meds: Sinemet, cymbalta, seroquel     Pulmonary:  No acute issues     Cardiovascular:    # Hx HTN  - Continue meds     GI/Nutrition:    No acute needs     Fluids/Electrolytes:  # Hypokalemia -- resolved post replacement  - Electrolyte replacement protocol if needed     Renal:   No acute issues     :  No acute issues  -  Endocrine:  # Hx of thyroid dz-- continue home replacement     Infectious disease:   #No acute issues  Status recovered COVID     Hematology:    #  Pelvic hemorrhage  -  Monitor and trend hb. Threshold for transfusion if hgb <7.0 or signs/symptoms of hypoperfusion.        Musculoskeletal:  # 1.  Acute fractures of the right sacral ala,  right superior pubic  ramus-body, right inferior pubic ramus-body, left superior pubic  ramus, and left inferior pubic ramus.  # 2.  Old compression fractures of the L4 and L5 vertebral bodies,  unchanged.  - ortho consult- Follow-up: orthopedics PA in clinic in 3 weeks. (referral placed, our schedulers will call patient)  - Physical and occupational therapy consults.  - Pain control     Skin:  - dilgent cares to prevent skin breakdown a     General Cares:   Discussed case at length with palliative care team on 12/24.  Family is discussing hospice options but wanted to be able to talk to more family over the holidays.  Palliative to talk further with them next week    Daughter Eladio called on mobile at 1:14 PM With no answer.     Other:              PPI/H2 blocker:  na              DVT prophylaxis: enox              Bowel Regimen/Date of last stool: PTA, add scheduled dulcolax and miralax              Pulmonary toilet: pt cooperation needed              ETOH screen completed done on H&P              Frailty Score: high     Code status: DNR/DNI               Discharge goals:     Adequate pain management: No    VSS x24 hours: yes        Hemoglobin stable x 48 hours: no    Ambulating safely and/or therapy evals complete: no    Drains/lines removed or plan in place to manage: NA    Teaching done: No    Other:  Expected D/C date: Monday.  [facility] not accepting admissions until Monday.  That is families first choice.  More work with palliative prior to discharge as well    Interval History   Labile behavior yesterday, last dose of anti-psychotic at 2000 last night.  Sleeping this am.  Exam done without waking her given issues with delirium.  ROS deferred with the same rational  Physical Exam   Temp: 97.9  F (36.6  C) Temp src: Oral BP: 135/60 Pulse: 58   Resp: 16 SpO2: 100 % O2 Device: Nasal cannula Oxygen Delivery: 2 LPM  Vitals:    12/23/20 2216 12/24/20 0600 12/25/20 0600   Weight: 68 kg (150 lb) 74 kg  (163 lb 2.3 oz) 73.2 kg (161 lb 6 oz)     Vital Signs with Ranges  Temp:  [97.6  F (36.4  C)-98.3  F (36.8  C)] 97.9  F (36.6  C)  Pulse:  [58-95] 58  Resp:  [16-20] 16  BP: (119-185)/() 135/60  SpO2:  [88 %-100 %] 100 %  I/O last 3 completed shifts:  In: 894.17 [P.O.:550; I.V.:344.17]  Out: 925 [Urine:925]    Constitutional: sleeping  ENT: Normocephalic, atraumatic  Respiratory: No increased work of breathing, good air exchange, clear to auscultation bilaterally, no crackles or wheezing.  Cardiovascular:  regular rate and rhythm, normal S1 and S2, and no murmur.   GI: Normal bowel sounds, abdomen soft, non-distended, non-tender, no guarding  Genitourinary:  Urine yellow  Skin:  Normal skin color,  Warm and dry  Musculoskeletal:   Pedal pulse palpated.  Neurologic: deferred  Neuropsychiatric: deferred    HAILEE Torres CNP  To contact the trauma service use job code pager [phone],   Numeric texts or alpha text through [employer]

## 2020-12-25 NOTE — PLAN OF CARE
Neuro: Alert/lethargic, oriented to self. Intermittently follows commands, but mostly has been pleasant.   Cardiac: SB/SR. VSS.    Respiratory: Sating >95% on 2L NC  GI/: Adequate urine output via purewick. No BM this shift, bowel sounds active, poor appetite, stool softeners ordered   Diet/appetite: Tolerating liquids PO  Activity:  Assist of 2 to turn and repo, bedrest orders   Pain: R hip and back pain- sched tyl and ibuprofen given as ordered  Skin: No new deficits noted. Christy redness to bottom   LDA's: No iv access, team aware and ok    Plan: Continue with POC. Notify primary team with changes.

## 2020-12-25 NOTE — PLAN OF CARE
OT: cancel, pt not actively participating in OT and currently with significant confusion and agitation towards staff.

## 2020-12-25 NOTE — PROGRESS NOTES
4pm: Trauma notified of elevated /94. No call back, no new orders.    9pm: Trauma CC notified of elevated /105. Orders to give scheduled hydrochlorothiazide (pt previously refused) and continue to monitor.

## 2020-12-25 NOTE — PLAN OF CARE
Neuro: Alert, oriented to self and time. Neuros intact. Pt angry, frustrated, agitated. Combative toward staff at times. Prn IM zyprexa given x1. Pt intermittently refusing cares/medications/tele/pulse ox; provider aware.  Cardiac: SR 70-90s.  BP elevated 170s/100s, see provider notification.   Respiratory: Sating >92% on RA, 2L NC when sleeping.  GI/: Adequate urine output via purewick. No BM this shift.  Diet/appetite: Tolerating regular diet. No appetite.  Activity:  Assist of 2 with repositioning.  Pain: Pt refusing PO meds at times, angry when asked assessment questions. Scheduled ibuprofen and gabapentin given.  Skin: No new deficits noted.  LDA's: No IV access d/t pt refusal, team aware.    Plan: Pelvic xray ordered for AM. Continue with POC. Notify primary team with changes.

## 2020-12-25 NOTE — PLAN OF CARE
Vitals:    12/25/20 1150 12/25/20 1521 12/25/20 1600 12/25/20 1716   BP: 124/70 (!) 181/97 133/75 132/51   BP Location: Left arm Left arm Left arm Right arm   Pulse: 80 60  65   Resp: 16 16  16   Temp: 98.3  F (36.8  C) 98.2  F (36.8  C)  97.5  F (36.4  C)   TempSrc: Oral Oral  Axillary   SpO2: 99% 99%  97%   Weight:       Height:         Pt transferred to unit 7B from unit 6B at 1730. Patient turned and positioned with assist of 2 and suppository given. Pt given diclofenac gel, has lidocaine patch on rt hip, positioned, and given ibuprofen for comfort. Meal tray ordered. Purewick re-placed for incontinence. Bed alarm activated for safety.Continue with the POC.    Admitted/transferred from: unit 6B  2 RN full   skin assessment completed by Raghav Dowd, BOBBY and Oscar Palma RN.  Skin assessment finding: skin intact, no problems   Interventions/actions: positioned for comfort.     Will continue to monitor.

## 2020-12-25 NOTE — PROGRESS NOTES
Transfer  Transferred to: 7B  Via: bed  Reason for transfer:Pt no longer appropriate for 6B- improved patient condition  Belongings: Packed and sent with pt  Chart: Delivered with pt to next unit  Medications: Meds sent to new unit with pt  Report given to: 7B RN  Pt status: Alert, confused. VSS on 2L NC.

## 2020-12-25 NOTE — PLAN OF CARE
Neuro: A&Ox2-orientated to self and month/year. Pt was sleeping most of the night.   Cardiac: SR/SR-ghanshyam HR 50-70s VSS.   Respiratory: Sating 100% on 2L NC  GI/: Adequate urine output via purewick and no BM  Diet/appetite: Regular diet, poor appetite  Activity: Strict bedrest, assist of 2 with repositioning  Pain: Pt was sleeping most of the night, she did complain of pain once but after the repositioning was complete she was no longer in pain. She was sleeping when her Tylenol was due and writer was unable to get her to wake up enough to take it.   Skin: No new deficits noted.  LDA's: No IV access, purewick in place.    Plan: Continue with POC. Notify primary team with changes.

## 2020-12-26 LAB
MAGNESIUM SERPL-MCNC: 2.3 MG/DL (ref 1.6–2.3)
POTASSIUM SERPL-SCNC: 3.8 MMOL/L (ref 3.4–5.3)

## 2020-12-26 PROCEDURE — 250N000013 HC RX MED GY IP 250 OP 250 PS 637: Performed by: NURSE PRACTITIONER

## 2020-12-26 PROCEDURE — 83735 ASSAY OF MAGNESIUM: CPT | Performed by: SURGERY

## 2020-12-26 PROCEDURE — 99232 SBSQ HOSP IP/OBS MODERATE 35: CPT | Performed by: PHYSICIAN ASSISTANT

## 2020-12-26 PROCEDURE — 250N000013 HC RX MED GY IP 250 OP 250 PS 637: Performed by: SURGERY

## 2020-12-26 PROCEDURE — 36415 COLL VENOUS BLD VENIPUNCTURE: CPT | Performed by: SURGERY

## 2020-12-26 PROCEDURE — 250N000011 HC RX IP 250 OP 636: Performed by: NURSE PRACTITIONER

## 2020-12-26 PROCEDURE — 84132 ASSAY OF SERUM POTASSIUM: CPT | Performed by: SURGERY

## 2020-12-26 PROCEDURE — 120N000002 HC R&B MED SURG/OB UMMC

## 2020-12-26 RX ORDER — HYDRALAZINE HYDROCHLORIDE 20 MG/ML
10 INJECTION INTRAMUSCULAR; INTRAVENOUS EVERY 6 HOURS PRN
Status: DISCONTINUED | OUTPATIENT
Start: 2020-12-26 | End: 2020-12-29

## 2020-12-26 RX ORDER — POTASSIUM CHLORIDE 750 MG/1
20 TABLET, EXTENDED RELEASE ORAL ONCE
Status: COMPLETED | OUTPATIENT
Start: 2020-12-26 | End: 2020-12-26

## 2020-12-26 RX ADMIN — LOSARTAN POTASSIUM 50 MG: 50 TABLET, FILM COATED ORAL at 10:45

## 2020-12-26 RX ADMIN — CARBIDOPA AND LEVODOPA 1 TABLET: 25; 100 TABLET ORAL at 15:31

## 2020-12-26 RX ADMIN — BISACODYL 10 MG: 10 SUPPOSITORY RECTAL at 10:44

## 2020-12-26 RX ADMIN — DICLOFENAC SODIUM 2 G: 10 GEL TOPICAL at 10:45

## 2020-12-26 RX ADMIN — QUETIAPINE FUMARATE 25 MG: 25 TABLET ORAL at 19:55

## 2020-12-26 RX ADMIN — DICLOFENAC SODIUM 2 G: 10 GEL TOPICAL at 15:31

## 2020-12-26 RX ADMIN — CARBOXYMETHYLCELLULOSE SODIUM 2 DROP: 5 SOLUTION/ DROPS OPHTHALMIC at 15:44

## 2020-12-26 RX ADMIN — POLYETHYLENE GLYCOL 3350 17 G: 17 POWDER, FOR SOLUTION ORAL at 10:25

## 2020-12-26 RX ADMIN — ACETAMINOPHEN 975 MG: 325 TABLET, FILM COATED ORAL at 04:06

## 2020-12-26 RX ADMIN — DOCUSATE SODIUM AND SENNOSIDES 1 TABLET: 8.6; 5 TABLET, FILM COATED ORAL at 10:28

## 2020-12-26 RX ADMIN — DICLOFENAC SODIUM 2 G: 10 GEL TOPICAL at 19:50

## 2020-12-26 RX ADMIN — CARBOXYMETHYLCELLULOSE SODIUM 2 DROP: 5 SOLUTION/ DROPS OPHTHALMIC at 19:50

## 2020-12-26 RX ADMIN — POTASSIUM CHLORIDE 20 MEQ: 750 TABLET, EXTENDED RELEASE ORAL at 15:30

## 2020-12-26 RX ADMIN — GABAPENTIN 400 MG: 400 CAPSULE ORAL at 10:46

## 2020-12-26 RX ADMIN — ACETAMINOPHEN 975 MG: 325 TABLET, FILM COATED ORAL at 17:18

## 2020-12-26 RX ADMIN — HYDROCHLOROTHIAZIDE 25 MG: 25 TABLET ORAL at 10:47

## 2020-12-26 RX ADMIN — GABAPENTIN 400 MG: 400 CAPSULE ORAL at 15:30

## 2020-12-26 RX ADMIN — GABAPENTIN 800 MG: 400 CAPSULE ORAL at 19:55

## 2020-12-26 RX ADMIN — ACETAMINOPHEN 975 MG: 325 TABLET, FILM COATED ORAL at 10:58

## 2020-12-26 RX ADMIN — CARBIDOPA AND LEVODOPA 1 TABLET: 25; 100 TABLET ORAL at 10:45

## 2020-12-26 RX ADMIN — ENOXAPARIN SODIUM 30 MG: 30 INJECTION SUBCUTANEOUS at 15:34

## 2020-12-26 RX ADMIN — CARBOXYMETHYLCELLULOSE SODIUM 2 DROP: 5 SOLUTION/ DROPS OPHTHALMIC at 10:46

## 2020-12-26 RX ADMIN — LIDOCAINE 3 PATCH: 560 PATCH PERCUTANEOUS; TOPICAL; TRANSDERMAL at 10:25

## 2020-12-26 RX ADMIN — ENOXAPARIN SODIUM 30 MG: 30 INJECTION SUBCUTANEOUS at 04:06

## 2020-12-26 RX ADMIN — ACETAMINOPHEN 975 MG: 325 TABLET, FILM COATED ORAL at 21:49

## 2020-12-26 RX ADMIN — DULOXETINE 60 MG: 20 CAPSULE, DELAYED RELEASE ORAL at 10:46

## 2020-12-26 RX ADMIN — CARBIDOPA AND LEVODOPA 1 TABLET: 25; 100 TABLET ORAL at 19:49

## 2020-12-26 RX ADMIN — LEVOTHYROXINE SODIUM 100 MCG: 100 TABLET ORAL at 10:46

## 2020-12-26 RX ADMIN — Medication 50 MCG: at 10:25

## 2020-12-26 RX ADMIN — MELATONIN TAB 3 MG 3 MG: 3 TAB at 19:50

## 2020-12-26 ASSESSMENT — ACTIVITIES OF DAILY LIVING (ADL)
ADLS_ACUITY_SCORE: 23
ADLS_ACUITY_SCORE: 24
ADLS_ACUITY_SCORE: 23
ADLS_ACUITY_SCORE: 24

## 2020-12-26 NOTE — PLAN OF CARE
7B Cancel OT : Per chart review this AM Pt activity orders bedrest, OT/PT paging team this AM. OT discussion with RN in PM, RN to page team about updated orders. OT unable to check back in PM, will reschedule for tomorrow.

## 2020-12-26 NOTE — PROGRESS NOTES
[facility]   Trauma Service Progress Note    Date of Service: 12/26/2020    Trauma Mechanism:  Date of Injury:   Known Injuries:  1.  Right sacral ala and Right pubic ramus fractures  2.  Pelvic edema/hemorrhage (space of Retzius)    Assessment & Plan   Neuro/Pain/Psych:  # Fall  - Head CT 12/23: Diffuse cerebral volume loss and cerebral white matter changes consistent with chronic small vessel ischemic disease. No evidence for acute intracranial pathology    # Acute on chronic pain   - 12/25: changed PTA Gabapentin from 300mg bid and 600mg at bedtime, to 400mg bid and 800mg at bedtime.   - Low dose scheduled NSAIDS, discussed with palliative, best option for now  - continuing PTA: diclofenac   - Scheduled: lidoderm, menthol  - Plan on not restating robaxin     # Encephalopathy, unknown etiology, likely opioid induced  # Baseline dementia?  # Depression  - Monitor neurological status. Delirium prevention and precautions.  - Scheduled: Melatonin   - Prn: Seroquel 12.5, also prn Haldol or Zyprexa - avoid is able   - continuing PTA: Sinemet, Cymbalta and Seroqual 25  -  Maintain circadian rhythm. Lights on during the day. Off at night, minimize cares at night.  OOB during the day.    Pulmonary:  - no acute issues   - Supplemental oxygen to keep saturation above 92 %. Currently on 1L NC  - Incentive spirometer while awake     Cardiovascular:    # Hypertension   - continuing PTA: hydrochlorothiazide, losartan   - Monitor hemodynamic status.      GI/Nutrition:    - Regular diet     Renal/ Fluids/Electrolytes:  # Hypokalemia, resolved   - electrolyte replacement protocol in place.     Endocrine:  # Hypothyroidism   - PTA medications: levothyroxine      Infectious disease:   # Recovered COVID  - No indications for antibiotics.     Hematology:    # Pelvic Hemorrhage   - Hgb stable. Monitor and trend.   - Threshold for transfusion if hgb <7.0 or signs/symptoms of hypoperfusion.        # DVT Prophylaxis: Lovenox 30mg bid    Musculoskeletal:  # Acute fractures of the right sacral ala, right superior pubic ramus-body, right inferior pubic ramus-body, left superior pubic ramus, and left inferior pubic ramus.  # Old compression fractures of the L4 and L5 vertebral bodies, unchanged.  # Weakness and deconditioning of chronic illness   - Ortho consult- Follow-up: orthopedics PA in clinic in 3 weeks. (referral placed, our schedulers will call patient)  - Physical and occupational therapy consults.    Skin:  - dilgent cares to prevent skin breakdown and wound formation.      Lines/ tubes/ drains:  - PIV     General Cares:    PPI/H2 blocker:  NA   DVT prophylaxis: PCD, Lovenox    Bowel Regimen/Date of last stool: In place   Pulmonary toilet: IS    Code status:  DNR/DNI     Discharge goals:     Adequate pain management: in process    VSS x24 hours: yes    Hemoglobin stable x 48 hours: yes    Ambulating safely and/or therapy evals complete: in process    Drains/lines removed or plan in place to manage: yes    Teaching done:     Other:  Expected D/C date: Dependent on PT/OT eval and disposition     MAYNOR Quevedo  To contact the trauma service use job code pager [phone],   Numeric texts or alpha text through [facility]     Interval History   Patient sleeping but easily awoken on exam. Pain is controlled is she remains still   ROS x 8 negative with exception of those things listed in interval hx    Physical Exam   Temp: 96.8  F (36  C) Temp src: Axillary BP: (!) 172/71 Pulse: 58   Resp: 16 SpO2: 97 % O2 Device: Nasal cannula Oxygen Delivery: 1 LPM  Vitals:    12/23/20 2216 12/24/20 0600 12/25/20 0600   Weight: 68 kg (150 lb) 74 kg (163 lb 2.3 oz) 73.2 kg (161 lb 6 oz)     Vital Signs with Ranges  Temp:  [96  F (35.6  C)-98.3  F (36.8  C)] 96.8  F (36  C)  Pulse:  [58-80] 58  Resp:  [16] 16  BP: ()/(51-97) 172/71  SpO2:  [96 %-99 %] 97 %  I/O last 3 completed shifts:  In: 240 [P.O.:240]  Out: 400  [Urine:400]    [place] Coma Scale - Total 13/15  Eye Response (E): 3   4= spontaneous, 3= to verbal/voice, 2= to pain, 1= No response   Verbal Response (V): 4  5= Orientated, converses, 4= Confused, converses, 3= Inappropriate words, 2= Incomprehensible sounds, 1=No response   Motor Response (M): 6   6= Obeys commands, 5= Localizes to pain, 4= Withdrawal to pain, 3=Fexion to pain, 2= Extension to pain, 1= No response     Constitutional: Sleepy, cooperative, no apparent distress.  Eyes: Lids and lashes normal, pupils equal, sclera clear, conjunctiva normal.  HENT: Normocephalic, atraumatic  Respiratory: No increased work of breathing, good air exchange, clear to auscultation bilaterally, no crackles or wheezing.  Cardiovascular:  regular rate and rhythm, normal S1 and S2,   GI: Normal bowel sounds, abdomen soft, non-distended, non-tender, no guarding  Skin:  Normal skin color, warm, dry  Musculoskeletal: There is no redness, warmth, or swelling of the joints.  Pedal pulse palpated.  Neurologic: Awake, Cranial nerves II-XII are grossly intact. No focal deficits.  Neuropsychiatric: Calm, affect appropriate to situation,

## 2020-12-26 NOTE — PLAN OF CARE
"Vital signs:  BP (!) 169/73   Pulse 58   Temp 97.3  F (36.3  C) (Oral)   Resp 16   Ht 1.676 m (5' 6\")   Wt 73.2 kg (161 lb 6 oz)   SpO2 95%   BMI 26.05 kg/m      Shift:7439-6845       Activity: bedrest, WBAT Pt also mentioned that left achilles is hurt and hurts to touch. Rolls well to the left. PT & OT following   Neuros: A&O x4, forgetful at times but understood situation and called appropriately   Cardiac:ghanshyam, denies chest pain   Respiratory: LS:clear/diminished, denies SOB, 1 L NC  GI/: +BS, +BM, +gas, voiding using the purewick   Diet: Reg-needs help ordering   Skin: bruised right hip, skin intact   Lines:no IV access   Labs:K 3.8-replaced and reordered for the next morning, Mg 2.3-reordered for the next morning   Pain/nausea: pain in the right hip, denies nausea   Plan:Cont POC       "

## 2020-12-26 NOTE — PROGRESS NOTES
Time: 2658-9624    Reason for admit: Fall     Neuro: Waxes and wanes, oriented 4x at times, can be occasionally disoriented to situation and time, forgetful at times   Activity: Bedrest, turn and repo q2h   Pain: Voltaren gel to hip and sacrum, scheduled tylenol and gabapentin given   Cardiac: HR and BP WNL, denies chest pain   Respiratory: 1L NC O2 sat in high 90s, denies SOB   GI/: Purewick in place, + BS, medium size loose, soft BM this shift   Diet: Regular   Lines/Drains: No PIV access, MD aware   Skin: Dry, flaky skin, Intact.     Events/Plan: Pt resting between cares. Occasionally refusing turn and repo d/t pain.     Continue to monitor and follow POC.

## 2020-12-27 ENCOUNTER — APPOINTMENT (OUTPATIENT)
Dept: OCCUPATIONAL THERAPY | Facility: CLINIC | Age: 80
DRG: 536 | End: 2020-12-27
Attending: NURSE PRACTITIONER
Payer: COMMERCIAL

## 2020-12-27 LAB
MAGNESIUM SERPL-MCNC: 2 MG/DL (ref 1.6–2.3)
POTASSIUM SERPL-SCNC: 4.3 MMOL/L (ref 3.4–5.3)

## 2020-12-27 PROCEDURE — 250N000013 HC RX MED GY IP 250 OP 250 PS 637: Performed by: NURSE PRACTITIONER

## 2020-12-27 PROCEDURE — 97110 THERAPEUTIC EXERCISES: CPT | Mod: GO

## 2020-12-27 PROCEDURE — 36415 COLL VENOUS BLD VENIPUNCTURE: CPT | Performed by: SURGERY

## 2020-12-27 PROCEDURE — 97535 SELF CARE MNGMENT TRAINING: CPT | Mod: GO

## 2020-12-27 PROCEDURE — 120N000002 HC R&B MED SURG/OB UMMC

## 2020-12-27 PROCEDURE — 250N000013 HC RX MED GY IP 250 OP 250 PS 637: Performed by: SURGERY

## 2020-12-27 PROCEDURE — 97165 OT EVAL LOW COMPLEX 30 MIN: CPT | Mod: GO

## 2020-12-27 PROCEDURE — 99232 SBSQ HOSP IP/OBS MODERATE 35: CPT | Performed by: PHYSICIAN ASSISTANT

## 2020-12-27 PROCEDURE — 83735 ASSAY OF MAGNESIUM: CPT | Performed by: SURGERY

## 2020-12-27 PROCEDURE — 250N000011 HC RX IP 250 OP 636: Performed by: NURSE PRACTITIONER

## 2020-12-27 PROCEDURE — 999N000128 HC STATISTIC PERIPHERAL IV START W/O US GUIDANCE

## 2020-12-27 PROCEDURE — 84132 ASSAY OF SERUM POTASSIUM: CPT | Performed by: SURGERY

## 2020-12-27 PROCEDURE — 250N000011 HC RX IP 250 OP 636: Performed by: PHYSICIAN ASSISTANT

## 2020-12-27 RX ORDER — MAGNESIUM OXIDE 400 MG/1
400 TABLET ORAL 2 TIMES DAILY
Status: COMPLETED | OUTPATIENT
Start: 2020-12-27 | End: 2020-12-28

## 2020-12-27 RX ADMIN — CARBOXYMETHYLCELLULOSE SODIUM 2 DROP: 5 SOLUTION/ DROPS OPHTHALMIC at 13:15

## 2020-12-27 RX ADMIN — ENOXAPARIN SODIUM 30 MG: 30 INJECTION SUBCUTANEOUS at 17:43

## 2020-12-27 RX ADMIN — CARBIDOPA AND LEVODOPA 1 TABLET: 25; 100 TABLET ORAL at 21:05

## 2020-12-27 RX ADMIN — Medication 400 MG: at 13:22

## 2020-12-27 RX ADMIN — HYDROCHLOROTHIAZIDE 25 MG: 25 TABLET ORAL at 09:27

## 2020-12-27 RX ADMIN — GABAPENTIN 400 MG: 400 CAPSULE ORAL at 09:29

## 2020-12-27 RX ADMIN — OLANZAPINE 5 MG: 5 TABLET, ORALLY DISINTEGRATING ORAL at 13:23

## 2020-12-27 RX ADMIN — CARBOXYMETHYLCELLULOSE SODIUM 2 DROP: 5 SOLUTION/ DROPS OPHTHALMIC at 21:06

## 2020-12-27 RX ADMIN — LIDOCAINE 2 PATCH: 560 PATCH PERCUTANEOUS; TOPICAL; TRANSDERMAL at 09:27

## 2020-12-27 RX ADMIN — DOCUSATE SODIUM AND SENNOSIDES 2 TABLET: 8.6; 5 TABLET, FILM COATED ORAL at 09:27

## 2020-12-27 RX ADMIN — GABAPENTIN 400 MG: 400 CAPSULE ORAL at 13:16

## 2020-12-27 RX ADMIN — BISACODYL 10 MG: 10 SUPPOSITORY RECTAL at 09:28

## 2020-12-27 RX ADMIN — ENOXAPARIN SODIUM 30 MG: 30 INJECTION SUBCUTANEOUS at 03:42

## 2020-12-27 RX ADMIN — CARBIDOPA AND LEVODOPA 1 TABLET: 25; 100 TABLET ORAL at 09:29

## 2020-12-27 RX ADMIN — QUETIAPINE FUMARATE 25 MG: 25 TABLET ORAL at 21:05

## 2020-12-27 RX ADMIN — POLYETHYLENE GLYCOL 3350 17 G: 17 POWDER, FOR SOLUTION ORAL at 09:30

## 2020-12-27 RX ADMIN — ACETAMINOPHEN 975 MG: 325 TABLET, FILM COATED ORAL at 13:16

## 2020-12-27 RX ADMIN — DICLOFENAC SODIUM 2 G: 10 GEL TOPICAL at 09:30

## 2020-12-27 RX ADMIN — CARBIDOPA AND LEVODOPA 1 TABLET: 25; 100 TABLET ORAL at 13:16

## 2020-12-27 RX ADMIN — Medication 400 MG: at 21:05

## 2020-12-27 RX ADMIN — IBUPROFEN 400 MG: 400 TABLET ORAL at 09:29

## 2020-12-27 RX ADMIN — DICLOFENAC SODIUM 2 G: 10 GEL TOPICAL at 21:05

## 2020-12-27 RX ADMIN — LEVOTHYROXINE SODIUM 100 MCG: 100 TABLET ORAL at 09:28

## 2020-12-27 RX ADMIN — DULOXETINE 60 MG: 20 CAPSULE, DELAYED RELEASE ORAL at 09:29

## 2020-12-27 RX ADMIN — GABAPENTIN 800 MG: 400 CAPSULE ORAL at 21:05

## 2020-12-27 RX ADMIN — DICLOFENAC SODIUM 2 G: 10 GEL TOPICAL at 17:43

## 2020-12-27 RX ADMIN — ACETAMINOPHEN 975 MG: 325 TABLET, FILM COATED ORAL at 21:05

## 2020-12-27 RX ADMIN — CARBOXYMETHYLCELLULOSE SODIUM 2 DROP: 5 SOLUTION/ DROPS OPHTHALMIC at 09:30

## 2020-12-27 RX ADMIN — LOSARTAN POTASSIUM 50 MG: 50 TABLET, FILM COATED ORAL at 09:28

## 2020-12-27 RX ADMIN — ACETAMINOPHEN 975 MG: 325 TABLET, FILM COATED ORAL at 03:42

## 2020-12-27 RX ADMIN — IBUPROFEN 400 MG: 400 TABLET ORAL at 17:43

## 2020-12-27 RX ADMIN — MELATONIN TAB 3 MG 3 MG: 3 TAB at 21:05

## 2020-12-27 RX ADMIN — DICLOFENAC SODIUM 2 G: 10 GEL TOPICAL at 13:16

## 2020-12-27 RX ADMIN — HYDRALAZINE HYDROCHLORIDE 10 MG: 20 INJECTION, SOLUTION INTRAMUSCULAR; INTRAVENOUS at 18:44

## 2020-12-27 RX ADMIN — Medication 50 MCG: at 09:29

## 2020-12-27 ASSESSMENT — ACTIVITIES OF DAILY LIVING (ADL)
ADLS_ACUITY_SCORE: 24

## 2020-12-27 ASSESSMENT — MIFFLIN-ST. JEOR: SCORE: 1226.75

## 2020-12-27 NOTE — PLAN OF CARE
PT HOLD/7B: Per discussion with OT, pt very limited by pain. Not appropriate for both therapies at this time. OT to continue to follow and re-assess when appropriate for PT to initiate

## 2020-12-27 NOTE — PROGRESS NOTES
"   12/27/20 1001   Quick Adds   Type of Visit Initial Occupational Therapy Evaluation   Living Environment   People in home facility resident   Current Living Arrangements assisted living   Home Accessibility no concerns   Transportation Anticipated agency   Living Environment Comments Pt reporting living in an assisted living facility, no concerns over accessibility. Pt reporting grab bars in bathroom near toilet and in shower. Shower is walk-in with slight ledge to step over. Pt reporting shower has \"built-in\" bench. Pt reporting she gets assistance for BADLs including dressing and bathing. Meals are provided by the [facility].    Self-Care   Usual Activity Tolerance moderate   Current Activity Tolerance poor   Regular Exercise No   Equipment Currently Used at Home walker, rolling;shower chair;grab bar, toilet;grab bar, tub/shower   Activity/Exercise/Self-Care Comment Pt reporting recently more difficulty with BADLs, needing increased assistance. Pt reporting dizziness when attempting ADLs is most limiting.    Disability/Function   Hearing Difficulty or Deaf no   Wear Glasses or Blind yes   Vision Management Glasses   Concentrating, Remembering or Making Decisions Difficulty yes   Concentration Management Pt reporting difficulty with STM   Difficulty Communicating no   Difficulty Eating/Swallowing no   Walking or Climbing Stairs Difficulty yes   Walking or Climbing Stairs ambulation difficulty, requires equipment   Mobility Management   (Pt reporting use of walker at baseline)   Dressing/Bathing Difficulty yes   Dressing/Bathing dressing difficulty, assistance 1 person   Toileting issues yes   Toileting Management Pt reporting use of raised toilet seat at baseline   Toileting Assistance toileting difficulty, requires equipment   Fall history within last six months yes   Number of times patient has fallen within last six months 7   Change in Functional Status Since Onset of Current Illness/Injury yes   General Information "   Onset of Illness/Injury or Date of Surgery 12/23/20   Referring Physician Kadie Hines APRN CNP   Patient/Family Therapy Goal Statement (OT) Did not state   Additional Occupational Profile Info/Pertinent History of Current Problem Per chart Mehreen Camara is a 80 year old female with h/o HTN, depression, arthritis s/p R total hip replacement who sustained a GLF at her nursing home and landed on her R hip. Ms. Camara does not remember the incident. History obtained from chart review and other teams. After her fall she was taken to [facility] and transferred to [facility] for further workup. She has been HD stable but intermittently agitated while inpatient.   Existing Precautions/Restrictions fall   Left Upper Extremity (Weight-bearing Status) full weight-bearing (FWB)   Right Upper Extremity (Weight-bearing Status) full weight-bearing (FWB)   Left Lower Extremity (Weight-bearing Status) weight-bearing as tolerated (WBAT)   Right Lower Extremity (Weight-bearing Status) weight-bearing as tolerated (WBAT)   Cognitive Status Examination   Orientation Status person;place   Cognitive Status Comments Pt able to respond to simple questions and commands on this date, intermittent drowsiness throughout evaluation, however easily arousale.    Visual Perception   Impact of Vision Impairment on Function (Vision) Pt reporting wearing glasses at baseline   Sensory   Sensory Comments No deficits noted   Range of Motion Comprehensive   Comment, General Range of Motion RUE WFL, LUE shoulder ROM limited due to past injury. LUE WFL at elbow and hand.    Strength Comprehensive (MMT)   Comment, General Manual Muscle Testing (MMT) Assessment No formally testing, gross weakness bilaterally.   Bed Mobility   Bed Mobility rolling left;rolling right   Rolling Left [place] (Bed Mobility) maximum assist (25% patient effort);1 person to manage equipment   Rolling Right [place] (Bed Mobility) maximum assist (25% patient  effort);1 person to manage equipment   Comment (Bed Mobility) Pt completeing rolling x4, with max A x 1 and additional person to assist with equipment. Pt limited by pain.    Transfers   Transfer Comments Did not assess, Pt significantly limited by pain this date.    Clinical Impression   Criteria for Skilled Therapeutic Interventions Met (OT) yes   OT Diagnosis Pt is below baseline for ADLs.    OT Problem List-Impairments impacting ADL activity tolerance impaired;cognition;strength;pain  (Weight bearing precautions)   Assessment of Occupational Performance 3-5 Performance Deficits   Identified Performance Deficits Dressing, bathing, toileting, functional mobility and transfers   Planned Therapy Interventions (OT) ADL retraining;bed mobility training;strengthening;transfer training;home program guidelines;progressive activity/exercise;risk factor education   Clinical Decision Making Complexity (OT) low complexity   Therapy Frequency (OT) 5x/week   Predicted Duration of Therapy 1 week    Anticipated Equipment Needs Upon Discharge (OT) other (see comments)  (TBD)   Risks and Benefits of Treatment have been explained. Yes   Patient, Family & other staff in agreement with plan of care Yes   OT Discharge Planning    OT Discharge Recommendation (DC Rec) Transitional Care Facility   OT Rationale for DC Rec Pt is below baseline for functional mobility and ADLs. Pt would benefit from further skilled therapies to progress towards PLOF.    OT Brief overview of current status   Mod-max A x2 for bed mobility   Total Evaluation Time (Minutes)   Total Evaluation Time (Minutes) 8

## 2020-12-27 NOTE — PLAN OF CARE
"/72 (BP Location: Right arm)   Pulse 67   Temp 97  F (36.1  C) (Oral)   Resp 18   Ht 1.676 m (5' 6\")   Wt 73.2 kg (161 lb 6 oz)   SpO2 93%   BMI 26.05 kg/m      Shift:6196-0715       Activity: bedrest, WBAT, worked with OT today, turning with assist x2   Neuros: Pt understands where she is at the [facility] and that she hurt her hips but doesn't realize she's in her own room and has mentioned things like \"when are we leaving the restaurant\" and \"I want to go back to the bed that I was in.\" Also having visual hallucinations seeing \"kids\" and \"people ice skating\"  When trying to reorient patient, she states she doesn't feel confused and started getting agitated since we cannot see what she does. D/t this continued agitation Zyprexa given x1   Cardiac:HTN-hydralazine given x1, denies chest pain   Respiratory: LS:clear/diminished, denies SOB, adequate oxygen on room air (>92%)   GI/: +BS, +large BM after suppository, +gas, voiding using the purewick   Diet: Reg-needs help ordering-room service ordered now   Skin: bruised right hip, skin intact   Lines:R PIV  Labs:K 4.3-replaced and reordered for the next morning, Mg 2.0-replaced and reordered for tomorrow morning  Pain/nausea: pain in the right hip & left hip- 1 patch on each hip, denies nausea   Plan:Cont POC, TCU when medically cleared  "

## 2020-12-27 NOTE — PROGRESS NOTES
[facility]   Trauma Service Progress Note    Date of Service: 12/27/2020    Trauma Mechanism: Fall   Date of Injury: 12/23/20  Known Injuries:  1.  Right sacral ala and Right pubic ramus fractures  2.  Pelvic edema/hemorrhage (space of Retzius)     Assessment & Plan   Neuro/Pain/Psych:  # Fall  - Head CT 12/23: Diffuse cerebral volume loss and cerebral white matter changes consistent with chronic small vessel ischemic disease. No evidence for acute intracranial pathology     # Acute on chronic pain   - 12/25: changed PTA Gabapentin from 300mg bid and 600mg at bedtime, to 400mg bid and 800mg at bedtime.   - Low dose scheduled NSAIDS, discussed with palliative, best option for now  - continuing PTA: diclofenac   - Scheduled: lidoderm, menthol  - Plan on not restating robaxin      # Encephalopathy, unknown etiology, likely opioid induced  # Baseline dementia?  # Depression  - Monitor neurological status. Delirium prevention and precautions.  - Scheduled: Melatonin   - Prn: Seroquel 12.5, also prn Haldol or Zyprexa - avoid is able   - continuing PTA: Sinemet, Cymbalta and Seroqual 25  - Maintain circadian rhythm. Lights on during the day. Off at night, minimize cares at night.  OOB during the day.       Pulmonary:  - no acute issues   - Supplemental oxygen to keep saturation above 92 %. Currently on room air  - Incentive spirometer while awake      Cardiovascular:    # Hypertension   - continuing PTA: hydrochlorothiazide, losartan   - Monitor hemodynamic status.       GI/Nutrition:    - Regular diet      Renal/ Fluids/Electrolytes:  # Hypokalemia, resolved   - electrolyte replacement protocol in place.      Endocrine:  # Hypothyroidism   - PTA medications: levothyroxine       Infectious disease:   # Recovered COVID  - No indications for antibiotics.      Hematology:    # Pelvic Hemorrhage   - Hgb stable. Monitor and trend.   - Threshold for transfusion if hgb <7.0 or  signs/symptoms of hypoperfusion.        # DVT Prophylaxis: Lovenox 30mg bid     Musculoskeletal:  # Acute fractures of the right sacral ala, right superior pubic ramus-body, right inferior pubic ramus-body, left superior pubic ramus, and left inferior pubic ramus.  # Old compression fractures of the L4 and L5 vertebral bodies, unchanged.  # Weakness and deconditioning of chronic illness   - Ortho consult- Follow-up: orthopedics PA in clinic in 3 weeks. (referral placed, our schedulers will call patient)  - Physical and occupational therapy consults.     Skin:  - dilgent cares to prevent skin breakdown and wound formation.       Lines/ tubes/ drains:  - PIV     General Cares:                 PPI/H2 blocker:  NA                DVT prophylaxis: PCD, Lovenox                 Bowel Regimen/Date of last stool: In place                Pulmonary toilet: IS     Code status:  DNR/DNI                  Discharge goals:     Adequate pain management: in process    VSS x24 hours: yes    Hemoglobin stable x 48 hours: yes    Ambulating safely and/or therapy evals complete: yes    Drains/lines removed or plan in place to manage: yes    Teaching done:     Other: Discussed with SW today  Expected D/C date: Awaiting TCU     MAYNOR Quevedo  To contact the trauma service use job code pager [phone],   Numeric texts or alpha text through [facility]     Interval History   Patient being moved by nurse and rehab. She was more alert and orientated this morning. She has some pain with movement.   ROS x 8 negative with exception of those things listed in interval hx    Physical Exam   Temp: 97  F (36.1  C) Temp src: Oral BP: 135/72 Pulse: 67   Resp: 18 SpO2: 93 % O2 Device: None (Room air) Oxygen Delivery: 1 LPM  Vitals:    12/23/20 2216 12/24/20 0600 12/25/20 0600   Weight: 68 kg (150 lb) 74 kg (163 lb 2.3 oz) 73.2 kg (161 lb 6 oz)     Vital Signs with Ranges  Temp:  [95.9  F (35.5  C)-97.3  F (36.3  C)] 97  F (36.1  C)  Pulse:  [61-70] 67  Resp:   [16-20] 18  BP: (116-155)/(61-72) 135/72  SpO2:  [93 %-97 %] 93 %  I/O last 3 completed shifts:  In: 120 [P.O.:120]  Out: 650 [Urine:650]    [place] Coma Scale - Total 15/15  Constitutional: Awake, alert, cooperative, no apparent distress..  Eyes: Lids and lashes normal, pupils equal, sclera clear, conjunctiva normal.  HENT: Normocephalic, atraumatic  Respiratory: No increased work of breathing, good air exchange, clear to auscultation bilaterally, no crackles or wheezing.  Cardiovascular:  regular rate and rhythm, normal S1 and S2,   GI: Normal bowel sounds, abdomen soft, non-distended, non-tender, no guarding  Skin:  Normal skin color, warm, dry  Musculoskeletal: There is no redness, warmth, or swelling of the joints.  Pedal pulse palpated.  Neurologic: Awake, Cranial nerves II-XII are grossly intact. No focal deficits.  Neuropsychiatric: Calm, affect appropriate to situation,

## 2020-12-27 NOTE — PROGRESS NOTES
Shift: 1790-9084  Activity: 2 assist to repo q2, WBAT per ortho  Neuros: Oriented at start of shift, around 0100 pt was oriented to self only for rest of shift. Bed alarm on for safety  Cardiac: WDL  Respiratory: Denies sob, sats mid 90s on 1L NC  GI/: Purewick in place with adequate uop, +bs and flatus  Diet: Regular  Skin: Bruising to hip, skin intact  Lines: None  Labs: Reviewed  Pain/Nausea: Pain managed with scheduled meds, denies nausea  Plan: Continue with poc

## 2020-12-28 ENCOUNTER — APPOINTMENT (OUTPATIENT)
Dept: OCCUPATIONAL THERAPY | Facility: CLINIC | Age: 80
DRG: 536 | End: 2020-12-28
Payer: COMMERCIAL

## 2020-12-28 LAB
ANION GAP SERPL CALCULATED.3IONS-SCNC: 3 MMOL/L (ref 3–14)
BUN SERPL-MCNC: 21 MG/DL (ref 7–30)
CALCIUM SERPL-MCNC: 8.6 MG/DL (ref 8.5–10.1)
CHLORIDE SERPL-SCNC: 102 MMOL/L (ref 94–109)
CO2 SERPL-SCNC: 32 MMOL/L (ref 20–32)
CREAT SERPL-MCNC: 0.54 MG/DL (ref 0.52–1.04)
ERYTHROCYTE [DISTWIDTH] IN BLOOD BY AUTOMATED COUNT: 12.6 % (ref 10–15)
GFR SERPL CREATININE-BSD FRML MDRD: 89 ML/MIN/{1.73_M2}
GLUCOSE SERPL-MCNC: 138 MG/DL (ref 70–99)
HCT VFR BLD AUTO: 36.5 % (ref 35–47)
HGB BLD-MCNC: 11.5 G/DL (ref 11.7–15.7)
MAGNESIUM SERPL-MCNC: 2.1 MG/DL (ref 1.6–2.3)
MCH RBC QN AUTO: 31.9 PG (ref 26.5–33)
MCHC RBC AUTO-ENTMCNC: 31.5 G/DL (ref 31.5–36.5)
MCV RBC AUTO: 101 FL (ref 78–100)
PLATELET # BLD AUTO: 143 10E9/L (ref 150–450)
POTASSIUM SERPL-SCNC: 3.6 MMOL/L (ref 3.4–5.3)
RBC # BLD AUTO: 3.61 10E12/L (ref 3.8–5.2)
SODIUM SERPL-SCNC: 138 MMOL/L (ref 133–144)
WBC # BLD AUTO: 4.1 10E9/L (ref 4–11)

## 2020-12-28 PROCEDURE — 250N000011 HC RX IP 250 OP 636: Performed by: NURSE PRACTITIONER

## 2020-12-28 PROCEDURE — 36415 COLL VENOUS BLD VENIPUNCTURE: CPT | Performed by: PHYSICIAN ASSISTANT

## 2020-12-28 PROCEDURE — 250N000013 HC RX MED GY IP 250 OP 250 PS 637: Performed by: PHYSICIAN ASSISTANT

## 2020-12-28 PROCEDURE — 97535 SELF CARE MNGMENT TRAINING: CPT | Mod: GO

## 2020-12-28 PROCEDURE — 250N000013 HC RX MED GY IP 250 OP 250 PS 637: Performed by: SURGERY

## 2020-12-28 PROCEDURE — 99232 SBSQ HOSP IP/OBS MODERATE 35: CPT | Performed by: NURSE PRACTITIONER

## 2020-12-28 PROCEDURE — 85027 COMPLETE CBC AUTOMATED: CPT | Performed by: PHYSICIAN ASSISTANT

## 2020-12-28 PROCEDURE — 83735 ASSAY OF MAGNESIUM: CPT | Performed by: PHYSICIAN ASSISTANT

## 2020-12-28 PROCEDURE — 80048 BASIC METABOLIC PNL TOTAL CA: CPT | Performed by: PHYSICIAN ASSISTANT

## 2020-12-28 PROCEDURE — 99232 SBSQ HOSP IP/OBS MODERATE 35: CPT | Performed by: PHYSICIAN ASSISTANT

## 2020-12-28 PROCEDURE — 250N000013 HC RX MED GY IP 250 OP 250 PS 637: Performed by: NURSE PRACTITIONER

## 2020-12-28 PROCEDURE — 120N000002 HC R&B MED SURG/OB UMMC

## 2020-12-28 RX ORDER — MAGNESIUM OXIDE 400 MG/1
400 TABLET ORAL 2 TIMES DAILY
Status: DISPENSED | OUTPATIENT
Start: 2020-12-28 | End: 2020-12-30

## 2020-12-28 RX ADMIN — QUETIAPINE FUMARATE 25 MG: 25 TABLET ORAL at 21:36

## 2020-12-28 RX ADMIN — DOCUSATE SODIUM AND SENNOSIDES 2 TABLET: 8.6; 5 TABLET, FILM COATED ORAL at 07:45

## 2020-12-28 RX ADMIN — ENOXAPARIN SODIUM 30 MG: 30 INJECTION SUBCUTANEOUS at 03:51

## 2020-12-28 RX ADMIN — CARBOXYMETHYLCELLULOSE SODIUM 2 DROP: 5 SOLUTION/ DROPS OPHTHALMIC at 07:46

## 2020-12-28 RX ADMIN — ACETAMINOPHEN 975 MG: 325 TABLET, FILM COATED ORAL at 21:36

## 2020-12-28 RX ADMIN — CARBOXYMETHYLCELLULOSE SODIUM 2 DROP: 5 SOLUTION/ DROPS OPHTHALMIC at 13:31

## 2020-12-28 RX ADMIN — DULOXETINE 60 MG: 20 CAPSULE, DELAYED RELEASE ORAL at 07:45

## 2020-12-28 RX ADMIN — LEVOTHYROXINE SODIUM 100 MCG: 100 TABLET ORAL at 07:46

## 2020-12-28 RX ADMIN — CARBIDOPA AND LEVODOPA 1 TABLET: 25; 100 TABLET ORAL at 20:23

## 2020-12-28 RX ADMIN — DICLOFENAC SODIUM 2 G: 10 GEL TOPICAL at 12:17

## 2020-12-28 RX ADMIN — CARBOXYMETHYLCELLULOSE SODIUM 2 DROP: 5 SOLUTION/ DROPS OPHTHALMIC at 20:23

## 2020-12-28 RX ADMIN — DICLOFENAC SODIUM 2 G: 10 GEL TOPICAL at 15:40

## 2020-12-28 RX ADMIN — IBUPROFEN 400 MG: 400 TABLET ORAL at 18:02

## 2020-12-28 RX ADMIN — GABAPENTIN 400 MG: 400 CAPSULE ORAL at 07:46

## 2020-12-28 RX ADMIN — MELATONIN TAB 3 MG 3 MG: 3 TAB at 20:23

## 2020-12-28 RX ADMIN — IBUPROFEN 400 MG: 400 TABLET ORAL at 07:45

## 2020-12-28 RX ADMIN — CARBIDOPA AND LEVODOPA 1 TABLET: 25; 100 TABLET ORAL at 13:31

## 2020-12-28 RX ADMIN — ACETAMINOPHEN 975 MG: 325 TABLET, FILM COATED ORAL at 15:40

## 2020-12-28 RX ADMIN — Medication 50 MCG: at 07:46

## 2020-12-28 RX ADMIN — BISACODYL 10 MG: 10 SUPPOSITORY RECTAL at 07:46

## 2020-12-28 RX ADMIN — LOSARTAN POTASSIUM 50 MG: 50 TABLET, FILM COATED ORAL at 07:45

## 2020-12-28 RX ADMIN — Medication 400 MG: at 20:23

## 2020-12-28 RX ADMIN — ACETAMINOPHEN 975 MG: 325 TABLET, FILM COATED ORAL at 03:51

## 2020-12-28 RX ADMIN — GABAPENTIN 800 MG: 400 CAPSULE ORAL at 21:36

## 2020-12-28 RX ADMIN — ENOXAPARIN SODIUM 30 MG: 30 INJECTION SUBCUTANEOUS at 15:40

## 2020-12-28 RX ADMIN — LIDOCAINE 3 PATCH: 560 PATCH PERCUTANEOUS; TOPICAL; TRANSDERMAL at 07:46

## 2020-12-28 RX ADMIN — Medication 400 MG: at 07:46

## 2020-12-28 RX ADMIN — CARBIDOPA AND LEVODOPA 1 TABLET: 25; 100 TABLET ORAL at 07:46

## 2020-12-28 RX ADMIN — Medication 2.5 MG: at 11:01

## 2020-12-28 RX ADMIN — GABAPENTIN 400 MG: 400 CAPSULE ORAL at 12:17

## 2020-12-28 RX ADMIN — HYDROCHLOROTHIAZIDE 25 MG: 25 TABLET ORAL at 07:46

## 2020-12-28 RX ADMIN — POLYETHYLENE GLYCOL 3350 17 G: 17 POWDER, FOR SOLUTION ORAL at 07:46

## 2020-12-28 RX ADMIN — IBUPROFEN 400 MG: 400 TABLET ORAL at 12:17

## 2020-12-28 RX ADMIN — DICLOFENAC SODIUM 2 G: 10 GEL TOPICAL at 07:47

## 2020-12-28 RX ADMIN — DICLOFENAC SODIUM 2 G: 10 GEL TOPICAL at 20:24

## 2020-12-28 RX ADMIN — ACETAMINOPHEN 975 MG: 325 TABLET, FILM COATED ORAL at 09:21

## 2020-12-28 ASSESSMENT — ACTIVITIES OF DAILY LIVING (ADL)
ADLS_ACUITY_SCORE: 24
ADLS_ACUITY_SCORE: 26
ADLS_ACUITY_SCORE: 31
ADLS_ACUITY_SCORE: 24
ADLS_ACUITY_SCORE: 31
ADLS_ACUITY_SCORE: 24

## 2020-12-28 ASSESSMENT — MIFFLIN-ST. JEOR: SCORE: 1230.75

## 2020-12-28 NOTE — PROGRESS NOTES
[facility]   Trauma Service Progress Note    Date of Service: 12/28/2020    Trauma Mechanism: Fall   Date of Injury: 12/23/20  Known Injuries:  1.  Right sacral ala and Right pubic ramus fractures  2.  Pelvic edema/hemorrhage (space of Retzius)    Assessment & Plan   # Fall  - Head CT 12/23: Diffuse cerebral volume loss and cerebral white matter changes consistent with chronic small vessel ischemic disease. No evidence for acute intracranial pathology     # Acute on chronic pain   - 12/25: changed PTA Gabapentin from 300mg bid and 600mg at bedtime, to 400mg bid and 800mg at bedtime.   - Low dose scheduled NSAIDS, discussed with palliative, best option for now  - continuing PTA: diclofenac   - Scheduled: lidoderm, menthol  - started Oxycodone to help with pain management with PT today, at 12.5mg to hold if it causes any sedation.   - Plan on not restating robaxin      # Encephalopathy, unknown etiology, likely opioid induced  # Baseline dementia?  # Depression  - Monitor neurological status. Delirium prevention and precautions.  - Scheduled: Melatonin   - Prn: Seroquel 12.5, also prn Haldol or Zyprexa - avoid is able   - continuing PTA: Sinemet, Cymbalta and Seroqual 25  - Maintain circadian rhythm. Lights on during the day. Off at night, minimize cares at night.  OOB during the day.        Pulmonary:  - no acute issues   - Supplemental oxygen to keep saturation above 92 %. Currently on room air  - Incentive spirometer while awake      Cardiovascular:    # Hypertension   - continuing PTA: hydrochlorothiazide, losartan   - Monitor hemodynamic status.       GI/Nutrition:    - Regular diet      Renal/ Fluids/Electrolytes:  # Hypokalemia, resolved   - electrolyte replacement protocol in place.      Endocrine:  # Hypothyroidism   - PTA medications: levothyroxine       Infectious disease:   # Recovered COVID  - No indications for antibiotics.      Hematology:    # Pelvic  Hemorrhage   - Hgb stable. Monitor and trend.   - Threshold for transfusion if hgb <7.0 or signs/symptoms of hypoperfusion.        # DVT Prophylaxis: Lovenox 30mg bid     Musculoskeletal:  # Acute fractures of the right sacral ala, right superior pubic ramus-body, right inferior pubic ramus-body, left superior pubic ramus, and left inferior pubic ramus.  # Old compression fractures of the L4 and L5 vertebral bodies, unchanged.  # Weakness and deconditioning of chronic illness   - Ortho consult- Follow-up: orthopedics PA in clinic in 3 weeks. (referral placed, our schedulers will call patient)  - Physical and occupational therapy consults.     Skin:  - dilgent cares to prevent skin breakdown and wound formation.       Lines/ tubes/ drains:  - PIV      General Cares:                 PPI/H2 blocker:  NA                DVT prophylaxis: PCD, Lovenox                 Bowel Regimen/Date of last stool: In place                Pulmonary toilet: IS     Code status:  DNR/DNI                  Discharge goals:     Adequate pain management: in process    VSS x24 hours: yes    Hemoglobin stable x 48 hours: yes    Ambulating safely and/or therapy evals complete: yes    Drains/lines removed or plan in place to manage: yes    Teaching done:     Other: Discussed with SW today  Expected D/C date: Awaiting TCU      MAYNOR Quevedo  To contact the trauma service use job code pager [phone],   Numeric texts or alpha text through [facility]       Interval History   No acute events overnight   ROS x 8 negative with exception of those things listed in interval hx    Physical Exam   Temp: 96.3  F (35.7  C) Temp src: Oral BP: (!) 148/67 Pulse: 60   Resp: 18 SpO2: 95 % O2 Device: None (Room air)    Vitals:    12/24/20 0600 12/25/20 0600 12/27/20 1447   Weight: 74 kg (163 lb 2.3 oz) 73.2 kg (161 lb 6 oz) 74 kg (163 lb 2.3 oz)     Vital Signs with Ranges  Temp:  [95.8  F (35.4  C)-98  F (36.7  C)] 96.3  F (35.7  C)  Pulse:  [60-85] 60  Resp:  [18-20]  18  BP: ()/(40-87) 148/67  SpO2:  [91 %-95 %] 95 %  I/O last 3 completed shifts:  In: 940 [P.O.:940]  Out: 950 [Urine:950]    [place] Coma Scale - Total 15/15    Constitutional: Awake, alert, cooperative, no apparent distress..  Eyes: Lids and lashes normal, pupils equal, sclera clear, conjunctiva normal.  HENT: Normocephalic, atraumatic  Respiratory: No increased work of breathing, good air exchange, clear to auscultation bilaterally, no crackles or wheezing.  Cardiovascular:  regular rate and rhythm, normal S1 and S2,   GI: Normal bowel sounds, abdomen soft, non-distended, non-tender, no guarding  Skin:  Normal skin color, warm, dry  Musculoskeletal: There is no redness, warmth, or swelling of the joints.  Pedal pulse palpated.  Neurologic: Awake, Cranial nerves II-XII are grossly intact. No focal deficits.  Neuropsychiatric: Calm, affect appropriate to situation,

## 2020-12-28 NOTE — PROGRESS NOTES
"Care Management Follow Up    Length of Stay (days): 5    Expected Discharge Date: 12/30/20     Concerns to be Addressed: discharge planning     Patient plan of care discussed at interdisciplinary rounds: Yes    Anticipated Discharge Disposition: Transitional Care     Anticipated Discharge Services: None  Anticipated Discharge DME: None    Patient/family educated on Medicare website which has current facility and service quality ratings: yes  Education Provided on the Discharge Plan:    Patient/Family in Agreement with the Plan: yes    Referrals Placed by CM/SW: Post Acute Facilities  Private pay costs discussed: to be discussed    Additional Information:  WILLIS following up on the referral to [facility]. Pt currently resides at the [facility] and the family is hopeful for pt to be at their TCU for continuity of care.    SW left a VM for admissions at [facility]. WILLIS also faxed a referral. (Ph: [phone], F: [phone]).    Due to hospital phone issues, WILLIS unable to call out to pt's daughter Eladio. WILLIS did follow up with Eladio by e-mail [email or web address] to inquire about additional preferences for placement.     6840 ADDENDUM: Per e-mail response from Eladio: \"It would be nice if J Luis isn't available that she could go to [facility]... do they have a opening do you know? Tough because my mom will not recover enough to go back to her apartment and placement is tough have seen so many places this past year that are not a good place for any seniors. We would prefer [facility] [address]. But understand there might not be a opening right now. If you let me know if not I can keep looking.\"     WILLIS submitted a referral to [facility] on  Phone: [phone]  Fax: [phone]    CHARLIE Peterson  Unit 5/[address] 8 Ortho/Med/Surg & [facility] Adult ED  Phone: [phone] Pager: [phone]                    "

## 2020-12-28 NOTE — DISCHARGE SUMMARY
[facility]     Discharge Summary  Trauma Surgery Service    Date of Admission:  12/23/2020  Date of Discharge:  12/30/2020  Attending Physician: Dr. Scot Benjamin   Discharging Provider: Baljit Yates CNP  Date of Service (when I saw the patient): 12/30/20    Primary Provider: Lisa Parry  Primary Care clinic: [address] [place] [address]  Phone: [phone]  Fax number: [phone]     Discharge Diagnoses   1.  Right sacral ala and Right pubic ramus fractures  2.  Pelvic edema/hemorrhage (space of Retzius)  3. Acute pain    Hospital Course    Mehreen Camara is a 80 year old nursing home resident female who was transferred from [facility] Emergency Dept for Trauma evaluation on 12/23.  Patient had a fall at home and landed on her right hip. She denied hitting her head and denied loss of consciousness. Patient was taken to local hospital by EMS. Work-up included CT head negative for intracranial bleeding, CT C-spine negative for acute injuries, CT of thoracic spine negative for acute injuries, CT lumbar spine negative for acute injuries and CT pelvis that showed acute fractures of the right sacral ala, right superior pubic ramus body, right inferior pubic ramus body, left superior pubic ramus, and left inferior pubic ramus, old compression fractures of L4 and L5 bodies and edema/hemorrhage in the space of Retzius.    Orthopedic Injury:Acute fractures of the right sacral ala, right superior pubic ramus-body, right inferior pubic ramus-body, left superior pubic ramus, and left inferior pubic ramus:  Mehreen Camara was evaluated by Orthopedics and her fractures were treated nonoperatively.    She will need Lovenox for DVT prophylaxis for 4 weeks.   Activity recommendations per Orthopedic surgery are weight bearing to bilateral lower extremities as pain tolerates.  Follow up in the Orthopedic Clinic in 3 weeks. She  was evaluated by  physical and occupational therapies during her hospitalization.  Please see summary of most current therapy notes below.     Edema/hemorrhage in the space of Retzius.  Patient remained hemodynamically stable. Serial hemoglobins were observed to verify she did not have an active bleed. On presentation her hemoglobin was 11.5 which remained stable throughout this hospital stay. No PRBC or blood products were administered this hospital visitPrior to discharge, her hemoglobin was 11.3.     Acute on chronic pain   Patient experienced acute pain from the recent traumatic fractures. On 12/25, her PTA Gabapentin was increased from 300mg bid and 600mg at bedtime, to 300mg bid and 800mg at bedtime. Her PTA diclofenac was continued with increased dosing for additional 2 days. Per Palliative recommendations, low dose scheduled NSAIDs were administered for a 5 day duration in addition to scheduled Lidoderm and menthol topical analgesics. She tolerated a low dose of Oxycodone 2.5mg which was offered prior to therapies for pain control.    Encephalopathy, etiology multifactoral, likely related to traumatic injuries, pain, hospitalization and polypharmacy  Baseline dementia?  Depression   Mrs. Camara developed an acute episode of altered cognition during her hospital course. Imaging on admission included a CT scan of the head and cervical spines with no acute pathology on injuries noted on imaging. This can typically be seen in the elderly with a history of dementia, falls, with traumtic injuries. Management includes maintaining a circadian rhythm, pain control, minimizing sedating medications, and assessing other potential lab abnormalities and infection. Prior to discharge she exhibited intermittent periods of confusion, however her behaviors were well managed as below:  - Scheduled: Melatonin at bedtime  - PRN: Seroquel 12.5mg available as needed for agitation, minimal use  - Continued PTA: Sinemet, Cymbalta and Seroqual  25mg at bedtime    Palliative Care Consult  The palliative care team was consulted to assist in the care and future life planning regarding the changes the above injuries have created. Often this sort of injury is a clear marker of general decline in the aged population.  During their time with the patient and family, these are the things they focused on this admission:  discussion of individual needs of patient with new traumatic injuries and life style changes , discussion of goals of care: , teach pt/family coping skills  and facilitate processing of healthcare experience, pain management, potential for transition to hospice if no progress with therapies.  Therapy Recommendations:   Current status of therapies prior to discharge: Transitional Care Facility. Pt is below baseline for functional mobility and ADLs. Pt would benefit from further skilled therapies to progress towards PLOF. Mod - Max A x 2 for bed mobility    Code Status   DNR / DNI    SUBJECTIVE: Prior to discharge Mrs Camara reported low back and right hip pain controlled on oral medications. It hard to sleep in the hospital. Denied chest pain, shortness of breath or abdominal pain.    Physical Exam   Temp: 96.9  F (36.1  C) Temp src: Oral BP: 129/63,  Pulse: 77   Resp: 18 SpO2: 94 % O2 Device: None (Room air)    Vitals:    12/25/20 0600 12/27/20 1447 12/28/20 1649   Weight: 73.2 kg (161 lb 6 oz) 74 kg (163 lb 2.3 oz) 74.4 kg (164 lb 0.4 oz)     Vital Signs with Ranges  Temp:  [96.4  F (35.8  C)-97.8  F (36.6  C)] 96.9  F (36.1  C)  Pulse:  [69-84] 77  Resp:  [17-18] 18  BP: ()/(39-83) 186/83  SpO2:  [93 %-95 %] 94 %  I/O last 3 completed shifts:  In: 720 [P.O.:720]  Out: -      Constitutional: Awake, alert, cooperative, no apparent distress..  Eyes: Lids and lashes normal, pupils equal, sclera clear, conjunctiva normal.  HENT: Normocephalic, atraumatic  Respiratory: No increased work of breathing, good air exchange, clear to auscultation  "bilaterally, no crackles or wheezing.  Cardiovascular:  regular rate and rhythm, normal S1 and S2,   GI: Normal bowel sounds, abdomen soft, non-distended, non-tender, no guarding  Skin:  Normal skin color, warm, dry  Musculoskeletal: There is no redness, warmth, or swelling of the joints.  Pedal pulse palpated.  Neurologic: Awake, and oriented to person, month, year and place, however forgetful at times. No focal deficits.  Neuropsychiatric: Calm, affect appropriate to situation,    Discharge Disposition   Discharged to short-term care facility  Condition at discharge: Stable  Discharge VS: Blood pressure 129/63, pulse 77, temperature 96.9  F (36.1  C), temperature source Oral, resp. rate 18, height 1.676 m (5' 6\"), weight 74.4 kg (164 lb 0.4 oz), SpO2 94 %, not currently breastfeeding.    Consultations This Hospital Stay   SOCIAL WORK IP CONSULT  ORTHOPAEDIC SURGERY ADULT/PEDS IP CONSULT  PHYSICAL THERAPY ADULT IP CONSULT  OCCUPATIONAL THERAPY ADULT IP CONSULT    Discharge Orders      POWERED PRESS REDUCING AIR MATTRESS     General info for SNF    Length of Stay Estimate: Short Term Care: Estimated # of Days <30  Condition at Discharge: Stable  Level of care:skilled   Rehabilitation Potential: Good  Admission H&P remains valid and up-to-date: Yes  Recent Chemotherapy: N/A  Use Nursing Home Standing Orders: Yes     Mantoux instructions    Give two-step Mantoux (PPD) Per Facility Policy Yes     Reason for your hospital stay    Ground level fall 12/23/2020  Right sacral ala and Right pubic ramus fractures  Pelvic edema/hemorrhage (space of Retzius)     Activity - Up with nursing assistance     Weight bearing status    Weight bearing as tolerated to the right and left lower extremities  Out of bed with nursing assistance     Follow Up and recommended labs and tests    Follow up with your primary care provider for continued medical care and hospital follow up in 5-10 days.    Orthopaedic Clinic in 3 weeks   [facility]  [address]   [address] [place] [address]   Appointments: [phone]     You have been involved in a recent trauma incident resulting in an injury.  Studies show us that people affected by trauma have higher levels of post-traumatic stress disorder (PTSD) and/or depressive symptoms during the year following an injury.     Please consider the following.  Have you:  Had migraines about the event(s) or thought about the event(s) when you didn't want to?  Tried hard not to think about the event(s) or went out of your way to avoid situations that reminded you of the event(s)?  Been constantly on guard, watchful, or easily startled?  Felt numb or detached from people, activities, or your surroundings?   Felt guilty or unable to stop blaming yourself or others for the event(s) or any problems the event (s) may have caused?    If you answered \"yes\" to 3 or more of these questions, or if you simply want to discuss any of your feelings further, we recommend that you talk with your Primary Care Provider or a mental health professional.     No CPR- Do NOT Intubate     Physical Therapy Adult Consult    Evaluate and treat as clinically indicated.    Reason:  Fall, pubic rami fractures     Occupational Therapy Adult Consult    Evaluate and treat as clinically indicated.    Reason:  Fall, pubic rami fractures     Advance Diet as Tolerated    Follow this diet upon discharge: Regular     Discharge Medications   Current Discharge Medication List      START taking these medications    Details   calcium carbonate (OS-YADIRA) 500 MG tablet Take 1 tablet (500 mg) by mouth 2 times daily  Qty:      Associated Diagnoses: Multiple closed fractures of pelvis without disruption of pelvic ring, initial encounter (H)      enoxaparin ANTICOAGULANT (LOVENOX) 30 MG/0.3ML syringe Inject 0.3 mLs (30 mg) Subcutaneous every 12 hours for 28 days    Comments: DVT prophylaxis  Associated Diagnoses: Multiple closed " fractures of pelvis without disruption of pelvic ring, initial encounter (H)      ibuprofen (ADVIL/MOTRIN) 400 MG tablet Take 1 tablet (400 mg) by mouth every 6 hours as needed for moderate pain  Qty:      Associated Diagnoses: Multiple closed fractures of pelvis without disruption of pelvic ring, initial encounter (H)      Lidocaine (LIDOCARE) 4 % Patch Place 2-3 patches onto the skin every 24 hours Apply to right hip and low back for pain  To prevent lidocaine toxicity, patient should be patch free for 12 hrs daily.    Associated Diagnoses: Multiple closed fractures of pelvis without disruption of pelvic ring, initial encounter (H)      menthol (ICY HOT) 5 % PTCH Apply 1 patch topically every 8 hours as needed for muscle soreness  Qty:      Associated Diagnoses: Multiple closed fractures of pelvis without disruption of pelvic ring, initial encounter (H)      methocarbamol (ROBAXIN) 500 MG tablet Take 1 tablet (500 mg) by mouth 2 times daily  Qty:      Associated Diagnoses: Multiple closed fractures of pelvis without disruption of pelvic ring, initial encounter (H)      !! vitamin D3 (CHOLECALCIFEROL) 50 mcg (2000 units) tablet Take 1 tablet (50 mcg) by mouth daily  Qty:      Associated Diagnoses: Multiple closed fractures of pelvis without disruption of pelvic ring, initial encounter (H)       !! - Potential duplicate medications found. Please discuss with provider.      CONTINUE these medications which have CHANGED    Details   acetaminophen (TYLENOL) 325 MG tablet Take 3 tablets (975 mg) by mouth every 6 hours  Qty:      Associated Diagnoses: Multiple closed fractures of pelvis without disruption of pelvic ring, initial encounter (H)      diclofenac (VOLTAREN) 1 % topical gel Apply 2 g topically 4 times daily for 2 days    Comments: Then transition to prior BID dosing  Associated Diagnoses: Multiple closed fractures of pelvis without disruption of pelvic ring, initial encounter (H)      !! gabapentin (NEURONTIN)  300 MG capsule Take 1 capsule (300 mg) by mouth 2 times daily  Qty:      Associated Diagnoses: Multiple closed fractures of pelvis without disruption of pelvic ring, initial encounter (H)      !! gabapentin (NEURONTIN) 400 MG capsule Take 2 capsules (800 mg) by mouth At Bedtime  Qty:      Associated Diagnoses: Multiple closed fractures of pelvis without disruption of pelvic ring, initial encounter (H)      oxyCODONE (ROXICODONE) 5 MG tablet Take 0.5 tablets (2.5 mg) by mouth every 6 hours as needed for severe pain  Qty: 16 tablet, Refills: 0    Associated Diagnoses: Multiple closed fractures of pelvis without disruption of pelvic ring, initial encounter (H)      polyethylene glycol (MIRALAX) 17 GM/Dose powder Take 17 g by mouth daily  Qty: 510 g    Associated Diagnoses: Multiple closed fractures of pelvis without disruption of pelvic ring, initial encounter (H)      !! QUEtiapine (SEROQUEL) 25 MG tablet Take 1 tablet (25 mg) by mouth At Bedtime  Qty:      Associated Diagnoses: Insomnia due to anxiety and fear      !! QUEtiapine (SEROQUEL) 25 MG tablet Take 0.5 tablets (12.5 mg) by mouth 3 times daily as needed (agitation)  Qty:      Associated Diagnoses: Insomnia due to anxiety and fear      senna-docusate (SENOKOT-S/PERICOLACE) 8.6-50 MG tablet Take 1-2 tablets by mouth 2 times daily  Qty:      Associated Diagnoses: Multiple closed fractures of pelvis without disruption of pelvic ring, initial encounter (H)       !! - Potential duplicate medications found. Please discuss with provider.      CONTINUE these medications which have NOT CHANGED    Details   bisacodyl (DULCOLAX) 10 MG suppository Place 10 mg rectally daily as needed for constipation      calcium carbonate (TUMS) 500 MG chewable tablet Take 1 chew tab by mouth daily as needed (indigestion)      carbidopa-levodopa (SINEMET)  MG tablet Take 1 tablet by mouth 3 times daily  Qty: 180 tablet, Refills: 11    Associated Diagnoses: Multiple system atrophy  (H)      clotrimazole (LOTRIMIN) 1 % external cream Apply topically 2 times daily as needed      DULoxetine (CYMBALTA) 20 MG capsule Take 60 mg by mouth daily      hydrochlorothiazide (HYDRODIURIL) 25 MG tablet TAKE 1 TABLET BY MOUTH EVERY DAY DX HYPERTENSION AND LE EDEMA  Qty: 31 tablet, Refills: 11    Associated Diagnoses: Essential hypertension      levothyroxine (SYNTHROID/LEVOTHROID) 100 MCG tablet Take 1 tablet (100 mcg) by mouth daily  Qty:      Associated Diagnoses: Hypothyroidism, unspecified type      losartan (COZAAR) 50 MG tablet TAKE 1 TABLET BY MOUTH EVERY MORNING  Qty: 31 tablet, Refills: 11    Associated Diagnoses: Essential hypertension      melatonin 3 MG tablet Take 1 tablet (3 mg) by mouth every 24 hours  Qty:      Associated Diagnoses: Insomnia due to anxiety and fear      polyethylene glycol-propylene glycol (SYSTANE ULTRA) 0.4-0.3 % SOLN ophthalmic solution Place 2 drops into both eyes 3 times daily (and additionally as needed/requested)      !! VITAMIN D3 25 MCG (1000 UT) tablet TAKE 2 TABLETS (2000IU) BY MOUTH EVERY DAY DX OSTEOPOROSIS  Qty: 60 tablet, Refills: 11    Associated Diagnoses: Multiple system atrophy (H)       !! - Potential duplicate medications found. Please discuss with provider.      STOP taking these medications       Zinc Oxide 13 % CREA Comments:   Reason for Stopping:             Allergies   Allergies   Allergen Reactions     Penicillins Anaphylaxis, Rash and Shortness Of Breath     Aspirin Nausea and GI Disturbance     Atenolol Cough     Atorvastatin Muscle Pain (Myalgia) and Nausea and Vomiting     Bupropion Nausea     Clindamycin Other (See Comments)     Constipation      Codeine Nausea     Ibuprofen Nausea     Stomach upset     Lovastatin Muscle Pain (Myalgia)     Cephalexin Rash     Neck reddness     Data   Most Recent 3 CBC's:  Recent Labs   Lab Test 12/29/20  1411 12/28/20  0843 12/25/20  0828 12/24/20  0800   WBC  --  4.1 6.4 5.7   HGB 11.3* 11.5* 12.1 12.2   MCV   --  101* 101* 98   PLT  --  143* 134* 151      Most Recent 3 BMP's:  Recent Labs   Lab Test 12/29/20  0634 12/28/20  0843 12/27/20  0851 12/25/20  0828 12/25/20  0828 12/24/20  0800   NA  --  138  --   --  139 139   POTASSIUM 3.6 3.6 4.3   < > 3.8 4.0   CHLORIDE  --  102  --   --  105 104   CO2  --  32  --   --  31 30   BUN  --  21  --   --  18 26   CR  --  0.54  --   --  0.51* 0.53   ANIONGAP  --  3  --   --  4 4   YADIRA  --  8.6  --   --  8.6 8.5   GLC  --  138*  --   --  88 87    < > = values in this interval not displayed.     Most Recent 2 LFT's:  Recent Labs   Lab Test 12/24/20  0127 01/27/20  1722   AST 22 21   ALT 18 16   ALKPHOS 65 73   BILITOTAL 0.9 0.4     Most Recent INR's and Anticoagulation Dosing History:  Anticoagulation Dose History     Recent Dosing and Labs Latest Ref Rng & Units 12/24/2020    INR 0.86 - 1.14 1.04        Most Recent 3 Troponin's:No lab results found.  Most Recent 6 Bacteria Isolates From Any Culture (See EPIC Reports for Culture Details):No lab results found.  Most Recent TSH, T4 and A1c Labs:No lab results found.  Results for orders placed or performed during the hospital encounter of 12/23/20   XR Pelvis 1/2 Views    Narrative    Exam: 3 views of the pelvis dated 12/25/2020.    COMPARISON: CT dated 12/23/2020.    CLINICAL HISTORY: Known pelvic fractures.    FINDINGS: 3 views of the pelvis were obtained. Post surgical changes  of a right total hip arthroplasty. The distal femoral stem is not  completely evaluated. Redemonstration of known fractures involving the  right and left superior and inferior pubic rami, comminuted on the  right. The known right sacral alar fracture is not well-seen on plain  radiographs. Vascular stents are noted.       Impression    IMPRESSION: Redemonstration of known right and left superior and  inferior pubic rami fractures, comminuted on the right. The known  right sacral alar fracture is not well seen on plain radiographs.    SANIA ALMAGUER MD        Time Spent on this Encounter   I, HAILEE Bazan CNP, personally saw the patient today and spent greater than 30 minutes discharging this patient.    We appreciate the opportunity to care for your patient while in the hospital.  Should you have any questions about their injuries or this discharge summary our contact information is below.    Trauma Services  [facility]   Department of Critical Care and Acute Care Surgery  [address] [place] [id number]  Office: [phone]

## 2020-12-28 NOTE — PROVIDER NOTIFICATION
Paged Trauma [phone] to notify that pt's BP was 181/87, HR 68 and recheck was 191/85 HR 71. Denies chest pain. Waiting to give IV prn d/t no IV access. Writer spoke with pt and pt was agreeable to have an PIV placed. Once placed writer will given prn hydralazine.

## 2020-12-28 NOTE — PLAN OF CARE
Pt here with R sacral and R pubic rambus fx 2/2 fall @ SNF, vs ex HTN and then resolved after scheduled BP meds, neuros include: slightly lethargic, d/o x1 (time), BUE 4/5, BLE 1/5, pt denies N/T, intermittently hallucinating, forgetful, pt difficult to get a thorough and consistent assessment d/t hx of dementia. PIV SL, regular diet with assistance ordering/tray set-up, ate well. Pt incontinent of bladder, attempted to have BM but unable to, passing lots of gas. PRN and scheduled pain meds, heat and pillows provided with minimal relief, pt still shouts with movement. RN attempted to get pt OOB via lift but pt was in too much pain. Periarea reddened, family spoke to pt over the phone, BA on @ all times for safety. Continue to monitor per orders.

## 2020-12-28 NOTE — PROGRESS NOTES
[facility]  Palliative Care Daily Progress Note       Recommendations & Counseling       For pain: she notes improved overall but significant pain with any movement or activity. Earlier delirium improved.     Seems quite difficult to balance pain control and side effects. Please continue Tylenol to 975 mg tid. Consider Robaxin 500 mg bid for spasm related pain and with recently adjusted Gabapentin knowing it can contribute to AMS in the elderly as well, would keep for now. We accept that immobilizing is unfortunately not possible and not a good candidate for a nerve block.    Contine small doses of opiates (oxycodone PO 2.5mg q4h prn).  Ultimately, PC would endorse methadone may be best choice for her to give best balance of pain relief with what hopefully is not contribution side effects .  EKG done. If QTc not prolonged would consider starting methadone 2.5 mg bid, which hopefully would be a good balance for her.  Would take several days to reach steady state. Goal discussed and she agreed to is tolerable pain; will not likely be able to be pain free.   Goals of care: In context of frailty, multiple fractures/ hospitalizations, and knowing that ability to rehabilitate in TCU is likely to be limited, PC recently spoke with daughter Eladio who is talking with 4 siblings about hospice, potentially at home versus in a facility.  Has not yet decided, but seems that family has discussed this even prior to this stay. Mehreen notes today she is not safe to return to previous apartment.       Assessments          Mehreen Camara is an 80-year-old woman who lives in assisted living and recently discharged from the TCU unfortunately presented with a another fracture.  She has had several fractures and TCU was in the last several months notably during a August 2020 and October 2020 hospitalizations.   Had just discharged from rehab the same day to her assisted living and  apparently had a fall landing on her hip. A CT of her pelvis showed numerous fractures and fluid in her pelvis (see report) seems per notes to been reasonably oriented on admission but became more confused throughout. PC consulted for geriatric trauma and frailty. Follow up today for below-  Frailty  Right sacral ala and Right pubic ramus fractures and associated pain  Pelvic edema/hemorrhage (space of Retzius)  Delirium- improved  Goals of care-see above    Prognosis, Goals, or Advance Care Planning was addressed today with: Yes.  Mood, coping, and/or meaning in the context of serious illness were addressed today: Yes.  Summary/Comments: notes unable to remain safe in present living environment. Trusts family to make good choices for her were she to be too ill.    Cameron STEPHEN NP  Nurse Practitioner- Lead Advanced Practice Provider  [facility] Palliative Medicine Consult Service   [phone]  TT spent: 33 minutes of which 22 minutes were spent in direct face to face contact with patient/family. Greater than 50% of time spent counseling and/or coordinating care.          Interval History:     Chart review/discussion with unit or clinical team members:   Feeling somewhat better overall. Significant discomfort with movement/ cares  No BM. Pain control suboptimal. Appetite is OK    Key Palliative Symptoms:  # Pain severity the last 12 hours: moderate  # Dyspnea severity the last 12 hours: none    Patient is on opioids: assessed and bowels ok/no needed changes to plan of care today.           Review of Systems:     Besides above, an additional 4 system ROS was reviewed and is unremarkable          Medications:     I have reviewed this patient's medication profile and medications during this hospitalization.   see above           Physical Exam:   Vitals were reviewed  Temp: 96.3  F (35.7  C) Temp src: Oral BP: (!) 148/67 Pulse: 60   Resp: 18 SpO2: 95 % O2 Device: None (Room air)     Constitutional: Awake, alert,  cooperative, no apparent distress..  clera clear, conjunctiva normal.  HEENT: Normocephalic, atraumatic. EOMI. Sclera clear  Respiratory: No increased work of breathing, good air exchange, clear to auscultation bilaterally, no crackles or wheezing.  Cardiovascular:  regular rate and rhythm, normal S1 and S2, soft stephanie   GI: Normal bowel sounds, abdomen soft, non-distended, non-tender, no guarding  Skin:  Normal skin color, warm, dry  Musculoskeletal: There is no redness, warmth, or swelling of exposed joints.  Pedal pulses palpated.  Neurologic: Awake, no tremor. No focal deficits.  Neuropsychiatric: Calm, affect appropriate to situation,             Data Reviewed:     ROUTINE LABS (Last four results)  BMP  Recent Labs   Lab 12/28/20  0843 12/27/20  0851 12/26/20  0739 12/25/20  0828 12/24/20  0800 12/24/20  0127     --   --  139 139 140   POTASSIUM 3.6 4.3 3.8 3.8 4.0 3.3*   CHLORIDE 102  --   --  105 104 103   YADIRA 8.6  --   --  8.6 8.5 8.6   CO2 32  --   --  31 30 33*   BUN 21  --   --  18 26 26   CR 0.54  --   --  0.51* 0.53 0.66   *  --   --  88 87 98     CBC  Recent Labs   Lab 12/28/20  0843 12/25/20  0828 12/24/20  0800 12/24/20  0127   WBC 4.1 6.4 5.7 6.0   RBC 3.61* 3.87 3.84 3.74*   HGB 11.5* 12.1 12.2 11.5*   HCT 36.5 38.9 37.6 36.4   * 101* 98 97   MCH 31.9 31.3 31.8 30.7   MCHC 31.5 31.1* 32.4 31.6   RDW 12.6 12.4 12.3 12.3   * 134* 151 159     INR  Recent Labs   Lab 12/24/20  0127   INR 1.04

## 2020-12-29 ENCOUNTER — APPOINTMENT (OUTPATIENT)
Dept: OCCUPATIONAL THERAPY | Facility: CLINIC | Age: 80
DRG: 536 | End: 2020-12-29
Payer: COMMERCIAL

## 2020-12-29 LAB
GLUCOSE BLDC GLUCOMTR-MCNC: 102 MG/DL (ref 70–99)
HGB BLD-MCNC: 11.3 G/DL (ref 11.7–15.7)
POTASSIUM SERPL-SCNC: 3.6 MMOL/L (ref 3.4–5.3)

## 2020-12-29 PROCEDURE — 250N000013 HC RX MED GY IP 250 OP 250 PS 637: Performed by: NURSE PRACTITIONER

## 2020-12-29 PROCEDURE — 250N000013 HC RX MED GY IP 250 OP 250 PS 637: Performed by: PHYSICIAN ASSISTANT

## 2020-12-29 PROCEDURE — 120N000002 HC R&B MED SURG/OB UMMC

## 2020-12-29 PROCEDURE — 258N000003 HC RX IP 258 OP 636: Performed by: NURSE PRACTITIONER

## 2020-12-29 PROCEDURE — 84132 ASSAY OF SERUM POTASSIUM: CPT | Performed by: SURGERY

## 2020-12-29 PROCEDURE — 36415 COLL VENOUS BLD VENIPUNCTURE: CPT | Performed by: NURSE PRACTITIONER

## 2020-12-29 PROCEDURE — 97535 SELF CARE MNGMENT TRAINING: CPT | Mod: GO

## 2020-12-29 PROCEDURE — 999N001017 HC STATISTIC GLUCOSE BY METER IP

## 2020-12-29 PROCEDURE — 250N000013 HC RX MED GY IP 250 OP 250 PS 637: Performed by: SURGERY

## 2020-12-29 PROCEDURE — 36415 COLL VENOUS BLD VENIPUNCTURE: CPT | Performed by: SURGERY

## 2020-12-29 PROCEDURE — 99232 SBSQ HOSP IP/OBS MODERATE 35: CPT | Performed by: NURSE PRACTITIONER

## 2020-12-29 PROCEDURE — 85018 HEMOGLOBIN: CPT | Performed by: NURSE PRACTITIONER

## 2020-12-29 PROCEDURE — 250N000011 HC RX IP 250 OP 636: Performed by: NURSE PRACTITIONER

## 2020-12-29 RX ORDER — AMOXICILLIN 250 MG
1-2 CAPSULE ORAL 2 TIMES DAILY
DISCHARGE
Start: 2020-12-29 | End: 2022-05-16

## 2020-12-29 RX ORDER — LIDOCAINE 4 G/G
2-3 PATCH TOPICAL EVERY 24 HOURS
DISCHARGE
Start: 2020-12-29 | End: 2021-03-05

## 2020-12-29 RX ORDER — GABAPENTIN 400 MG/1
800 CAPSULE ORAL AT BEDTIME
DISCHARGE
Start: 2020-12-29 | End: 2020-12-30

## 2020-12-29 RX ORDER — QUETIAPINE FUMARATE 25 MG/1
25 TABLET, FILM COATED ORAL AT BEDTIME
DISCHARGE
Start: 2020-12-29

## 2020-12-29 RX ORDER — GABAPENTIN 300 MG/1
300 CAPSULE ORAL 2 TIMES DAILY
Status: DISCONTINUED | OUTPATIENT
Start: 2020-12-30 | End: 2020-12-30 | Stop reason: HOSPADM

## 2020-12-29 RX ORDER — OXYCODONE HYDROCHLORIDE 5 MG/1
2.5 TABLET ORAL EVERY 6 HOURS PRN
Qty: 16 TABLET | Refills: 0 | Status: SHIPPED | OUTPATIENT
Start: 2020-12-29 | End: 2021-01-04

## 2020-12-29 RX ORDER — GABAPENTIN 400 MG/1
400 CAPSULE ORAL 2 TIMES DAILY
DISCHARGE
Start: 2020-12-29 | End: 2020-12-30

## 2020-12-29 RX ORDER — QUETIAPINE FUMARATE 25 MG/1
12.5 TABLET, FILM COATED ORAL 3 TIMES DAILY PRN
DISCHARGE
Start: 2020-12-29 | End: 2022-05-16

## 2020-12-29 RX ORDER — METHOCARBAMOL 500 MG/1
500 TABLET, FILM COATED ORAL 2 TIMES DAILY
Status: DISCONTINUED | OUTPATIENT
Start: 2020-12-29 | End: 2020-12-30 | Stop reason: HOSPADM

## 2020-12-29 RX ORDER — LANOLIN ALCOHOL/MO/W.PET/CERES
3 CREAM (GRAM) TOPICAL EVERY 24 HOURS
DISCHARGE
Start: 2020-12-29 | End: 2021-03-05

## 2020-12-29 RX ORDER — IBUPROFEN 400 MG/1
400 TABLET, FILM COATED ORAL EVERY 6 HOURS PRN
Status: DISCONTINUED | OUTPATIENT
Start: 2020-12-29 | End: 2020-12-30 | Stop reason: HOSPADM

## 2020-12-29 RX ORDER — IBUPROFEN 400 MG/1
400 TABLET, FILM COATED ORAL EVERY 6 HOURS PRN
DISCHARGE
Start: 2020-12-29 | End: 2021-02-12

## 2020-12-29 RX ORDER — HYDRALAZINE HYDROCHLORIDE 20 MG/ML
10 INJECTION INTRAMUSCULAR; INTRAVENOUS EVERY 6 HOURS PRN
Status: DISCONTINUED | OUTPATIENT
Start: 2020-12-29 | End: 2020-12-30 | Stop reason: HOSPADM

## 2020-12-29 RX ORDER — SODIUM CHLORIDE, SODIUM LACTATE, POTASSIUM CHLORIDE, CALCIUM CHLORIDE 600; 310; 30; 20 MG/100ML; MG/100ML; MG/100ML; MG/100ML
INJECTION, SOLUTION INTRAVENOUS CONTINUOUS
Status: ACTIVE | OUTPATIENT
Start: 2020-12-29 | End: 2020-12-29

## 2020-12-29 RX ORDER — POLYETHYLENE GLYCOL 3350 17 G/17G
17 POWDER, FOR SOLUTION ORAL DAILY
Qty: 510 G | DISCHARGE
Start: 2020-12-29 | End: 2021-03-19

## 2020-12-29 RX ORDER — ACETAMINOPHEN 325 MG/1
975 TABLET ORAL EVERY 6 HOURS
DISCHARGE
Start: 2020-12-29 | End: 2021-01-12

## 2020-12-29 RX ORDER — GABAPENTIN 300 MG/1
600 CAPSULE ORAL AT BEDTIME
Status: DISCONTINUED | OUTPATIENT
Start: 2020-12-29 | End: 2020-12-30

## 2020-12-29 RX ADMIN — DICLOFENAC SODIUM 2 G: 10 GEL TOPICAL at 08:10

## 2020-12-29 RX ADMIN — ACETAMINOPHEN 975 MG: 325 TABLET, FILM COATED ORAL at 21:31

## 2020-12-29 RX ADMIN — GABAPENTIN 400 MG: 400 CAPSULE ORAL at 08:04

## 2020-12-29 RX ADMIN — METHOCARBAMOL 500 MG: 500 TABLET ORAL at 19:55

## 2020-12-29 RX ADMIN — Medication 2.5 MG: at 21:31

## 2020-12-29 RX ADMIN — IBUPROFEN 400 MG: 400 TABLET ORAL at 08:04

## 2020-12-29 RX ADMIN — CARBOXYMETHYLCELLULOSE SODIUM 2 DROP: 5 SOLUTION/ DROPS OPHTHALMIC at 08:04

## 2020-12-29 RX ADMIN — DULOXETINE 60 MG: 20 CAPSULE, DELAYED RELEASE ORAL at 08:04

## 2020-12-29 RX ADMIN — LOSARTAN POTASSIUM 50 MG: 50 TABLET, FILM COATED ORAL at 08:04

## 2020-12-29 RX ADMIN — LEVOTHYROXINE SODIUM 100 MCG: 100 TABLET ORAL at 08:04

## 2020-12-29 RX ADMIN — CARBIDOPA AND LEVODOPA 1 TABLET: 25; 100 TABLET ORAL at 19:55

## 2020-12-29 RX ADMIN — OLANZAPINE 5 MG: 5 TABLET, ORALLY DISINTEGRATING ORAL at 16:20

## 2020-12-29 RX ADMIN — POLYETHYLENE GLYCOL 3350 17 G: 17 POWDER, FOR SOLUTION ORAL at 08:06

## 2020-12-29 RX ADMIN — DICLOFENAC SODIUM 2 G: 10 GEL TOPICAL at 16:20

## 2020-12-29 RX ADMIN — Medication 2.5 MG: at 09:26

## 2020-12-29 RX ADMIN — HYDROCHLOROTHIAZIDE 25 MG: 25 TABLET ORAL at 08:04

## 2020-12-29 RX ADMIN — CARBIDOPA AND LEVODOPA 1 TABLET: 25; 100 TABLET ORAL at 08:03

## 2020-12-29 RX ADMIN — Medication 2.5 MG: at 16:20

## 2020-12-29 RX ADMIN — GABAPENTIN 400 MG: 400 CAPSULE ORAL at 12:48

## 2020-12-29 RX ADMIN — DOCUSATE SODIUM AND SENNOSIDES 2 TABLET: 8.6; 5 TABLET, FILM COATED ORAL at 08:05

## 2020-12-29 RX ADMIN — CARBOXYMETHYLCELLULOSE SODIUM 2 DROP: 5 SOLUTION/ DROPS OPHTHALMIC at 19:54

## 2020-12-29 RX ADMIN — ACETAMINOPHEN 975 MG: 325 TABLET, FILM COATED ORAL at 04:05

## 2020-12-29 RX ADMIN — SODIUM CHLORIDE, POTASSIUM CHLORIDE, SODIUM LACTATE AND CALCIUM CHLORIDE: 600; 310; 30; 20 INJECTION, SOLUTION INTRAVENOUS at 15:45

## 2020-12-29 RX ADMIN — CARBIDOPA AND LEVODOPA 1 TABLET: 25; 100 TABLET ORAL at 14:15

## 2020-12-29 RX ADMIN — Medication 2.5 MG: at 00:01

## 2020-12-29 RX ADMIN — Medication 400 MG: at 08:04

## 2020-12-29 RX ADMIN — SODIUM CHLORIDE, POTASSIUM CHLORIDE, SODIUM LACTATE AND CALCIUM CHLORIDE: 600; 310; 30; 20 INJECTION, SOLUTION INTRAVENOUS at 20:02

## 2020-12-29 RX ADMIN — GABAPENTIN 600 MG: 300 CAPSULE ORAL at 21:36

## 2020-12-29 RX ADMIN — ENOXAPARIN SODIUM 30 MG: 30 INJECTION SUBCUTANEOUS at 04:06

## 2020-12-29 RX ADMIN — Medication 50 MCG: at 08:04

## 2020-12-29 RX ADMIN — CARBOXYMETHYLCELLULOSE SODIUM 2 DROP: 5 SOLUTION/ DROPS OPHTHALMIC at 14:15

## 2020-12-29 RX ADMIN — ACETAMINOPHEN 975 MG: 325 TABLET, FILM COATED ORAL at 10:46

## 2020-12-29 RX ADMIN — MELATONIN TAB 3 MG 3 MG: 3 TAB at 19:55

## 2020-12-29 RX ADMIN — QUETIAPINE FUMARATE 25 MG: 25 TABLET ORAL at 19:55

## 2020-12-29 RX ADMIN — ENOXAPARIN SODIUM 30 MG: 30 INJECTION SUBCUTANEOUS at 16:20

## 2020-12-29 RX ADMIN — LIDOCAINE 3 PATCH: 560 PATCH PERCUTANEOUS; TOPICAL; TRANSDERMAL at 08:06

## 2020-12-29 RX ADMIN — DOCUSATE SODIUM AND SENNOSIDES 2 TABLET: 8.6; 5 TABLET, FILM COATED ORAL at 19:55

## 2020-12-29 RX ADMIN — Medication 400 MG: at 19:55

## 2020-12-29 RX ADMIN — SODIUM CHLORIDE, POTASSIUM CHLORIDE, SODIUM LACTATE AND CALCIUM CHLORIDE 500 ML: 600; 310; 30; 20 INJECTION, SOLUTION INTRAVENOUS at 14:16

## 2020-12-29 ASSESSMENT — ACTIVITIES OF DAILY LIVING (ADL)
ADLS_ACUITY_SCORE: 28
ADLS_ACUITY_SCORE: 28
ADLS_ACUITY_SCORE: 26
ADLS_ACUITY_SCORE: 28
ADLS_ACUITY_SCORE: 28
ADLS_ACUITY_SCORE: 26

## 2020-12-29 NOTE — PROGRESS NOTES
[facility]   Trauma Service Progress Note    Date of Service (when I saw the patient): 12/29/2020     Assessment & Plan   Trauma Mechanism: Fall   Date of Injury: 12/23/20  Known Injuries:  1.  Right sacral ala and Right pubic ramus fractures  2.  Pelvic edema/hemorrhage (space of Retzius)     Brief HPI:  Mehreen Camara is a 80 year old female who was transferred from [facility] Emergency Dept.  Patient had a fall at her care facility and landed on her right hip.  She denied hitting her head and denies loss of consciousness. Work-up included CT head negative for intracranial bleeding, CT C-spine negative for acute injuries, CT of thoracic spine negative for acute injuries, CT lumbar spine negative for acute injuries and CT pelvis that showed acute fractures of the right sacral ala, right superior pubic ramus body, right inferior pubic ramus body, left superior pubic ramus, and left inferior pubic ramus, old compression fractures of L4 and L5 bodies and edema/hemorrhage in the space of Retzius.    Neuro/Pain/Psych:  # Fall  - Head CT 12/23: Diffuse cerebral volume loss and cerebral white matter changes consistent with chronic small vessel ischemic disease. No evidence for acute intracranial pathology     # Acute on chronic pain   - 12/25: changed PTA Gabapentin from 300mg bid and 600mg at bedtime, to 400mg bid and 800mg at bedtime.   - Low dose scheduled NSAIDS (transitioned to PRN dosing following a 5 day scheduled course) discussed with palliative, best option for now  - continuing PTA: diclofenac   - Scheduled: lidoderm, menthol  - Plan on not restating robaxin      # Encephalopathy, unknown etiology, likely opioid induced, improved  # Baseline dementia?  # Depression  - Monitor neurological status. Delirium prevention and precautions.  - Scheduled: Melatonin at bedtime  - Prn: Seroquel 12.5mg available as needed for agitation  - continuing PTA:  Sinemet, Cymbalta and Seroqual 25  - Maintain circadian rhythm. Lights on during the day. Off at night, minimize cares at night.  OOB during the day.        Pulmonary:  - no acute issues   - Supplemental oxygen to keep saturation above 92 %. Currently on room air  - Incentive spirometer while awake      Cardiovascular:    # Hypertension   - continuing PTA: hydrochlorothiazide, losartan   - Monitor hemodynamic status.       GI/Nutrition:    - Regular diet      Renal/ Fluids/Electrolytes:  # Hypokalemia, resolved   - electrolyte replacement protocol in place.      Endocrine:  # Hypothyroidism   - PTA medications: levothyroxine       Infectious disease:   # Recovered COVID  - No indications for antibiotics.      Hematology:    # Pelvic Hemorrhage   - Hgb has been stable without transfusions  - Threshold for transfusion if hgb <7.0 or signs/symptoms of hypoperfusion.        # DVT Prophylaxis: Lovenox 30mg bid     Musculoskeletal:  # Acute fractures of the right sacral ala, right superior pubic ramus-body, right inferior pubic ramus-body, left superior pubic ramus, and left inferior pubic ramus.  Seen by Orthopedic surgery for her injuries and managed conservatively.  # Old compression fractures of the L4 and L5 vertebral bodies, unchanged.  # Weakness and deconditioning of chronic illness   - Ortho consult- Follow-up: orthopedics PA in clinic in 3 weeks. (referral placed, our schedulers will call patient)  - Physical and occupational therapy consults.     Skin:  - dilgent cares to prevent skin breakdown and wound formation.       Lines/ tubes/ drains:  - PIV      General Cares:                 PPI/H2 blocker:  NA                DVT prophylaxis: PCD, Lovenox x4 weeks                Bowel Regimen/Date of last stool: 12/28                Pulmonary toilet: IS     Code status:  DNR/DNI                  Discharge goals:     Adequate pain management: in process    VSS x24 hours: yes    Hemoglobin stable x 48  hours: yes    Ambulating safely and/or therapy evals complete: yes    Drains/lines removed or plan in place to manage: yes    Teaching done: as able with patient  Expected D/C date: medically ready for discharge, awaiting TCU bed    Interval History   Reports low back pain and right hip pain. Denies shortness of breath, abdominal pain. Reports a good appetite.  ROS x 8 negative with exception of those things listed in interval hx    Physical Exam   Temp: 95.8  F (35.4  C) Temp src: Axillary BP: 122/60 Pulse: 71   Resp: 18 SpO2: 94 % O2 Device: None (Room air)    Vitals:    12/25/20 0600 12/27/20 1447 12/28/20 1649   Weight: 73.2 kg (161 lb 6 oz) 74 kg (163 lb 2.3 oz) 74.4 kg (164 lb 0.4 oz)     Vital Signs with Ranges  Temp:  [95.8  F (35.4  C)-97  F (36.1  C)] 95.8  F (35.4  C)  Pulse:  [60-75] 71  Resp:  [18] 18  BP: ()/(51-77) 122/60  SpO2:  [92 %-95 %] 94 %  I/O last 3 completed shifts:  In: 940 [P.O.:940]  Out: 1150 [Urine:900; Other:250]    Gaffney Coma Scale - Total 15/15  Eye Response (E): 4   4= spontaneous, 3= to verbal/voice, 2= to pain, 1= No response   Verbal Response (V): 5   5= Orientated, converses, 4= Confused, converses, 3= Inappropriate words, 2= Incomprehensible sounds, 1=No response   Motor Response (M): 6   6= Obeys commands, 5= Localizes to pain, 4= Withdrawal to pain, 3=Fexion to pain, 2= Extension to pain, 1= No response   Constitutional: Awake, alert, cooperative, no apparent distress..  HENT: Normocephalic, atraumatic  Respiratory: No increased work of breathing, good air exchange, clear to auscultation bilaterally, no crackles or wheezing.  Cardiovascular:  regular rate and rhythm, normal S1 and S2,   GI: Normal bowel sounds, abdomen soft, non-distended, non-tender, no guarding  Skin:  Normal skin color, warm, dry  Musculoskeletal: There is no redness, warmth, or swelling of the joints.  Pedal pulse palpated.  Neurologic: Awake, oriented to person, year, month, and place. No focal  deficits.  Neuropsychiatric: Calm, affect appropriate to situation    HAILEE Baazn CNP  To contact the trauma service use job code pager [phone],   Numeric texts or alpha text through [facility]

## 2020-12-29 NOTE — CONSULTS
Care Management Follow Up    Length of Stay (days): 6    Expected Discharge Date: 12/30/20     Concerns to be Addressed: discharge planning     Patient plan of care discussed at interdisciplinary rounds: Yes    Anticipated Discharge Disposition: Transitional Care     Anticipated Discharge Services: None  Anticipated Discharge DME: None    Patient/family educated on Medicare website which has current facility and service quality ratings: yes  Education Provided on the Discharge Plan:    Patient/Family in Agreement with the Plan: yes    Referrals Placed by CM/SW: Post Acute Facilities  Private pay costs discussed: Not applicable    Additional Information:  WILLIS received a phone call from [facility] [phone], they are able to accept pt for admission today.   WILLIS called pt's daughter Eladio [phone] - Eladio reports she would first like to know if [facility] can accept pt before making a decision. Eladio requested WILLIS email her back at [email or web address] with an update.  WILLIS called [facility] on [phone] requesting an update on bed availability ASAP.     Parul Shepherd, ANGIE, [facility]  Adult Acute Care   Ph:[phone]  Pager [phone]

## 2020-12-29 NOTE — PROGRESS NOTES
[facility]  Palliative Care Daily Progress Note       Recommendations & Counseling       For pain: she notes no perceived improvement overall. Continues with pain with any movement or activity. Earlier delirium persists intermittently.     Seems quite difficult to balance pain control and side effects. Please continue Tylenol to 975 mg tid and Consider Robaxin 500 mg bid for spasm related pain and with recently adjusted Gabapentin knowing it can contribute to AMS in the elderly as well-  would keep for now. We accept that immobilizing is unfortunately not possible and not a good candidate for a nerve block.    Contine small doses of opiates (oxycodone PO 2.5mg q4h prn).  Ultimately, PC would endorse methadone may be best choice for her to give best balance of pain relief with what hopefully is not contribution side effects .  EKG done. If QTc not prolonged would consider starting methadone 2.5 mg bid, which hopefully would be a good balance for her.  Would take several days to reach steady state. Goal discussed and she agreed to is tolerable pain; will not likely be able to be pain free.   Goals of care: In context of frailty, multiple fractures/ hospitalizations, and knowing that ability to rehabilitate in TCU is likely to be limited, PC recently spoke with daughter Eladio who is talking with 4 siblings about hospice, potentially at home versus in a facility.  Has not yet decided, but seems that family has discussed this even prior to this stay. Mehreen notes today she is not safe to return to previous apartment. She is not sure what the best answer is. Would benefit from care conference with family for decisional support and goals.      Assessments          Mehreen OBRIEN Jax is an 80-year-old woman who lives in assisted living and recently discharged from the TCU unfortunately presented with a another fracture.  She has had several fractures and TCU was in the last  several months notably during a August 2020 and October 2020 hospitalizations.   Had just discharged from rehab the same day to her assisted living and apparently had a fall landing on her hip. A CT of her pelvis showed numerous fractures and fluid in her pelvis (see report) seems per notes to been reasonably oriented on admission but became more confused throughout. PC consulted for geriatric trauma and frailty. Follow up today for below-  Frailty  Right sacral ala and Right pubic ramus fractures and associated pain  Pelvic edema/hemorrhage (space of Retzius)  Delirium- improved  Goals of care-see above    Prognosis, Goals, or Advance Care Planning was addressed today with: Yes.  Mood, coping, and/or meaning in the context of serious illness were addressed today: Yes.  Summary/Comments: notes unable to remain safe in present living environment. Trusts family to make good choices for her were she to be too ill.    Cameron STEPHEN NP  Nurse Practitioner- Lead Advanced Practice Provider  [facility] Palliative Medicine Consult Service   [phone]  TT spent: 26 minutes of which 15 minutes were spent in direct face to face contact with patient/family. Greater than 50% of time spent counseling and/or coordinating care.          Interval History:     Chart review/discussion with unit or clinical team members:   Feeling unchanged overall. Significant discomfort with movement/ cares . + BM. Pain control ok at rest - less than optimal. Appetite is OK    Key Palliative Symptoms:  # Pain severity the last 12 hours: low  # Dyspnea severity the last 12 hours: none    Patient is on opioids: assessed and bowels ok/no needed changes to plan of care today.           Review of Systems:     Besides above, an additional 4 system ROS was reviewed and is unremarkable          Medications:     I have reviewed this patient's medication profile and medications during this hospitalization.   see above           Physical Exam:   Vitals  were reviewed  Temp: 97.8  F (36.6  C) Temp src: Axillary BP: 136/69 Pulse: 84   Resp: 18 SpO2: 94 % O2 Device: None (Room air)     Constitutional: Awake, alert, cooperative, no apparent distress..  HEENT: Normocephalic, atraumatic. EOMI.  Respiratory: No increased work of breathing, good air exchange, clear to auscultation bilaterally, no crackles or wheezing. Room air  Cardiovascular:  regular rate and rhythm, normal S1 and S2  GI: Normal bowel sounds, abdomen soft, non-distended, non-tender, no guarding  Skin:  Normal skin color, warm, dry  Musculoskeletal: There is no redness, warmth, or swelling of exposed joints.  Pedal pulses palpated.  Neurologic: Awake, no tremor. No focal deficits.  Neuropsychiatric: Calm, affect appropriate with occ tangential comments.           Data Reviewed:     ROUTINE LABS (Last four results)  BMP  Recent Labs   Lab 12/29/20  0634 12/28/20  0843 12/27/20  0851 12/26/20  0739 12/25/20  0828 12/24/20  0800 12/24/20  0127   NA  --  138  --   --  139 139 140   POTASSIUM 3.6 3.6 4.3 3.8 3.8 4.0 3.3*   CHLORIDE  --  102  --   --  105 104 103   YADIRA  --  8.6  --   --  8.6 8.5 8.6   CO2  --  32  --   --  31 30 33*   BUN  --  21  --   --  18 26 26   CR  --  0.54  --   --  0.51* 0.53 0.66   GLC  --  138*  --   --  88 87 98     CBC  Recent Labs   Lab 12/29/20  1411 12/28/20  0843 12/25/20  0828 12/24/20  0800 12/24/20  0127   WBC  --  4.1 6.4 5.7 6.0   RBC  --  3.61* 3.87 3.84 3.74*   HGB 11.3* 11.5* 12.1 12.2 11.5*   HCT  --  36.5 38.9 37.6 36.4   MCV  --  101* 101* 98 97   MCH  --  31.9 31.3 31.8 30.7   MCHC  --  31.5 31.1* 32.4 31.6   RDW  --  12.6 12.4 12.3 12.3   PLT  --  143* 134* 151 159     INR  Recent Labs   Lab 12/24/20  0127   INR 1.04

## 2020-12-29 NOTE — PLAN OF CARE
"/55 (BP Location: Right arm)   Pulse 69   Temp 97  F (36.1  C) (Oral)   Resp 18   Ht 1.676 m (5' 6\")   Wt 74.4 kg (164 lb 0.4 oz)   SpO2 95%   BMI 26.47 kg/m      Reason for admission: fall   Neuro: disoriented to place, forgetful, easily redirectable   Cardiac: WDL, denies chest pain  Respiratory: on RA, denies sob  GI/: pt was incontinent of bowel x1, pt also used bedpan x2 this shift  Skin: blanchable redness on christian-area, cream applied and turning pt q2-3h  Pain: scheduled tylenol, ibuprofen, voltaren gel, R/L hip  LDA: PIV SL  Activity: A2, repositioning q2-3h  Diet/Appetite: reg diet, tray assist, denies N/V  Plan: discharge pending for 12/30 to TCU, continue POC    "

## 2020-12-29 NOTE — PLAN OF CARE
Patient is intermittently confused, calm and cooperative. Not in respiratory distress, clear LS, VSS on RA. +BS, soft non distended abdomen, denies nausea, no BM. Urine incontinence 1x; purewick placed, 500 clear urine in cannister. In pain mostly during repositioning; Oxycodone 2.5 mg given 1x, and scheduled Tylenol given. Bed alarm activated. Sleeps in between cares. Continue poc.   Vitals:    12/28/20 1957 12/28/20 2018 12/28/20 2225 12/29/20 0406   BP: 97/55 127/55 110/64 138/64   BP Location: Left arm Right arm Right arm Right arm   Pulse: 75 69 66 65   Resp: 18  18 18   Temp: 97  F (36.1  C)  96.8  F (36  C) 96.3  F (35.7  C)   TempSrc: Oral  Oral Axillary   SpO2: 94% 95% 93% 94%   Weight:       Height:

## 2020-12-29 NOTE — PLAN OF CARE
7B Defer PT Consult  PT orders acknowledged and appreciated. Per chart review and discussion with OT, Pt mobilizing SBA-IND, anticipate 1 therapy discipline. OT to follow to address functional mobility and ADLs. PT to complete orders as pt does not demonstrate IP PT needs.

## 2020-12-30 ENCOUNTER — VIRTUAL VISIT (OUTPATIENT)
Dept: GERIATRICS | Facility: CLINIC | Age: 80
End: 2020-12-30
Payer: COMMERCIAL

## 2020-12-30 VITALS
HEIGHT: 66 IN | OXYGEN SATURATION: 94 % | WEIGHT: 164.02 LBS | TEMPERATURE: 96 F | SYSTOLIC BLOOD PRESSURE: 143 MMHG | DIASTOLIC BLOOD PRESSURE: 70 MMHG | HEART RATE: 75 BPM | RESPIRATION RATE: 18 BRPM | BODY MASS INDEX: 26.36 KG/M2

## 2020-12-30 VITALS
RESPIRATION RATE: 18 BRPM | DIASTOLIC BLOOD PRESSURE: 70 MMHG | BODY MASS INDEX: 26.47 KG/M2 | WEIGHT: 164 LBS | HEART RATE: 75 BPM | OXYGEN SATURATION: 94 % | SYSTOLIC BLOOD PRESSURE: 143 MMHG | TEMPERATURE: 96 F

## 2020-12-30 DIAGNOSIS — S32.110D CLOSED NONDISPLACED ZONE I FRACTURE OF SACRUM WITH ROUTINE HEALING, SUBSEQUENT ENCOUNTER: Primary | ICD-10-CM

## 2020-12-30 DIAGNOSIS — K59.01 SLOW TRANSIT CONSTIPATION: ICD-10-CM

## 2020-12-30 DIAGNOSIS — M54.41 CHRONIC BILATERAL LOW BACK PAIN WITH BILATERAL SCIATICA: ICD-10-CM

## 2020-12-30 DIAGNOSIS — M54.42 CHRONIC BILATERAL LOW BACK PAIN WITH BILATERAL SCIATICA: ICD-10-CM

## 2020-12-30 DIAGNOSIS — F41.1 GAD (GENERALIZED ANXIETY DISORDER): ICD-10-CM

## 2020-12-30 DIAGNOSIS — G23.2 MULTIPLE SYSTEM ATROPHY P (H): ICD-10-CM

## 2020-12-30 DIAGNOSIS — S32.591D FRACTURE OF MULTIPLE PUBIC RAMI WITH ROUTINE HEALING, RIGHT: ICD-10-CM

## 2020-12-30 DIAGNOSIS — G89.29 CHRONIC BILATERAL LOW BACK PAIN WITH BILATERAL SCIATICA: ICD-10-CM

## 2020-12-30 DIAGNOSIS — Z79.899 POLYPHARMACY: ICD-10-CM
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
## 2020-12-30 DIAGNOSIS — F02.80 LEWY BODY DEMENTIA WITHOUT BEHAVIORAL DISTURBANCE (H): ICD-10-CM

## 2020-12-30 DIAGNOSIS — I10 ESSENTIAL HYPERTENSION: ICD-10-CM

## 2020-12-30 DIAGNOSIS — M15.0 PRIMARY OSTEOARTHRITIS INVOLVING MULTIPLE JOINTS: ICD-10-CM

## 2020-12-30 DIAGNOSIS — F33.9 EPISODE OF RECURRENT MAJOR DEPRESSIVE DISORDER, UNSPECIFIED DEPRESSION EPISODE SEVERITY (H): ICD-10-CM

## 2020-12-30 DIAGNOSIS — R42 DIZZINESS: ICD-10-CM

## 2020-12-30 DIAGNOSIS — G31.83 LEWY BODY DEMENTIA WITHOUT BEHAVIORAL DISTURBANCE (H): ICD-10-CM

## 2020-12-30 PROCEDURE — 99305 1ST NF CARE MODERATE MDM 35: CPT | Mod: 95 | Performed by: NURSE PRACTITIONER

## 2020-12-30 PROCEDURE — 250N000013 HC RX MED GY IP 250 OP 250 PS 637: Performed by: NURSE PRACTITIONER

## 2020-12-30 PROCEDURE — 250N000013 HC RX MED GY IP 250 OP 250 PS 637: Performed by: PHYSICIAN ASSISTANT

## 2020-12-30 PROCEDURE — 250N000011 HC RX IP 250 OP 636: Performed by: NURSE PRACTITIONER

## 2020-12-30 PROCEDURE — 99239 HOSP IP/OBS DSCHRG MGMT >30: CPT | Performed by: NURSE PRACTITIONER

## 2020-12-30 RX ORDER — GABAPENTIN 300 MG/1
300 CAPSULE ORAL 2 TIMES DAILY
DISCHARGE
Start: 2020-12-30 | End: 2021-01-12

## 2020-12-30 RX ORDER — METHOCARBAMOL 500 MG/1
500 TABLET, FILM COATED ORAL 2 TIMES DAILY
DISCHARGE
Start: 2020-12-30 | End: 2021-02-27

## 2020-12-30 RX ORDER — CHOLECALCIFEROL (VITAMIN D3) 50 MCG
1 TABLET ORAL DAILY
Status: ON HOLD | DISCHARGE
Start: 2020-12-30 | End: 2023-01-01

## 2020-12-30 RX ORDER — GABAPENTIN 400 MG/1
800 CAPSULE ORAL AT BEDTIME
DISCHARGE
Start: 2020-12-30 | End: 2021-03-19 | Stop reason: DRUGHIGH

## 2020-12-30 RX ORDER — GABAPENTIN 400 MG/1
800 CAPSULE ORAL AT BEDTIME
Status: DISCONTINUED | OUTPATIENT
Start: 2020-12-30 | End: 2020-12-30 | Stop reason: HOSPADM

## 2020-12-30 RX ORDER — CALCIUM CARBONATE 500(1250)
1 TABLET ORAL 2 TIMES DAILY
DISCHARGE
Start: 2020-12-30 | End: 2021-02-27

## 2020-12-30 RX ADMIN — Medication 50 MCG: at 09:39

## 2020-12-30 RX ADMIN — METHOCARBAMOL 500 MG: 500 TABLET ORAL at 09:39

## 2020-12-30 RX ADMIN — CARBOXYMETHYLCELLULOSE SODIUM 2 DROP: 5 SOLUTION/ DROPS OPHTHALMIC at 07:59

## 2020-12-30 RX ADMIN — LIDOCAINE 1 PATCH: 560 PATCH PERCUTANEOUS; TOPICAL; TRANSDERMAL at 07:47

## 2020-12-30 RX ADMIN — CARBIDOPA AND LEVODOPA 1 TABLET: 25; 100 TABLET ORAL at 13:58

## 2020-12-30 RX ADMIN — Medication 2.5 MG: at 11:58

## 2020-12-30 RX ADMIN — GABAPENTIN 300 MG: 300 CAPSULE ORAL at 13:58

## 2020-12-30 RX ADMIN — DOCUSATE SODIUM AND SENNOSIDES 2 TABLET: 8.6; 5 TABLET, FILM COATED ORAL at 09:41

## 2020-12-30 RX ADMIN — LEVOTHYROXINE SODIUM 100 MCG: 100 TABLET ORAL at 09:40

## 2020-12-30 RX ADMIN — DULOXETINE 60 MG: 20 CAPSULE, DELAYED RELEASE ORAL at 09:40

## 2020-12-30 RX ADMIN — POLYETHYLENE GLYCOL 3350 17 G: 17 POWDER, FOR SOLUTION ORAL at 09:41

## 2020-12-30 RX ADMIN — LOSARTAN POTASSIUM 50 MG: 50 TABLET, FILM COATED ORAL at 09:40

## 2020-12-30 RX ADMIN — CARBOXYMETHYLCELLULOSE SODIUM 2 DROP: 5 SOLUTION/ DROPS OPHTHALMIC at 13:58

## 2020-12-30 RX ADMIN — ACETAMINOPHEN 975 MG: 325 TABLET, FILM COATED ORAL at 09:44

## 2020-12-30 RX ADMIN — CARBIDOPA AND LEVODOPA 1 TABLET: 25; 100 TABLET ORAL at 09:41

## 2020-12-30 RX ADMIN — ENOXAPARIN SODIUM 30 MG: 30 INJECTION SUBCUTANEOUS at 07:59

## 2020-12-30 RX ADMIN — GABAPENTIN 300 MG: 300 CAPSULE ORAL at 09:40

## 2020-12-30 ASSESSMENT — ACTIVITIES OF DAILY LIVING (ADL)
ADLS_ACUITY_SCORE: 28
ADLS_ACUITY_SCORE: 26

## 2020-12-30 NOTE — PLAN OF CARE
Vitals:    12/29/20 2338 12/30/20 0720 12/30/20 0753 12/30/20 1205   BP: 129/63 (!) 182/76 (!) 186/83 (!) 143/70   BP Location: Right arm Left arm Right arm    Pulse: 69 77  75   Resp: 18 18  18   Temp: 96.5  F (35.8  C) 96.9  F (36.1  C)  96  F (35.6  C)   TempSrc: Oral Oral  Axillary   SpO2: 93% 94%  94%   Weight:       Height:         A&O only to self. Confused and agitated. Appeared to be sleeping between cares.    /76, recheck 143/70 after pt took morning meds. Lidocaine patch on R shoulder.  Oxycodone given once, scheduled Tylenol, Robaxin and Gabapentin given.    +flatus, +BS. No bm this shift, refused miralax. Incontinent of urine, changed x2.   Bed alarm on. Ate breakfast and lunch. Discharged to TCU with stretcher.

## 2020-12-30 NOTE — PROGRESS NOTES
"[facility] Video Visit  Mehreen Camara is a 80 year old female who is being evaluated via a billable video visit due to the restrictions of the COVID19 pandemic.The patient has been notified of following: \"This video visit will be conducted via a call between you and your provider. We have found that certain health care needs can be provided without the need for an in-person physical exam.  This service lets us provide the care you need with a video conversation.  If a prescription is necessary we can send it directly to your pharmacy.  If lab work is needed we can place an order for that and you can then stop by our lab to have the test done at a later time. If during the course of the call the provider feels a video visit is not appropriate, you will not be charged for this service.\"     Proivder has received verbal consent for a Video Visit from the patient? Yes    Patient/facility would like the video invitation sent by: Send to e-mail at: [email or web address]    This visit took place virtually, with the patient located at [facility].  ________________________________________________  Video Start Time: 1554  TCU Admission  PRIMARY CARE PROVIDER AND CLINIC:  Lisa Parry, APRN CNP, [address] / [place] [address]  Chief Complaint   Patient presents with     Video Visit     [facility] F/U   [place] MRN: [id number]. Mehreen Camara  is a 80 year old  (1940), admitted to the above facility from  [facility]. Hospital stay 12/23 through 12/30. Admitted to this facility for  rehab, medical management and nursing care. HPI information obtained from: facility chart records, facility staff, patient report, [facility] chart review and Care Everywhere [facility] chart review.     Brief Summary of Hospital Course: Mehreen experienced a fall at [facility] on 12/23 and was transferred to [facility] for evaluation. She was found to have a right " sacral ala and right pubic ramus fractures. She also had small pelvic edema/hemorrhage in the space of [place]. She was treated conservatively and will follow-up w/ ortho in 3 weeks.    Updates since admission to transitional care unit: Mehreen presented to TCU on 12/30 s/p the above events. She is allow to be WBAT, but we note with her underlying joint issues, this will be a challenge. Her new pain  Medication regimen is significant, and will need careful monitoring given her MSA (which ultimately may have been the cause of her fall). Today, Mehreen states sometimes the pain is really rough, and nursing states she is able to ask for PRNs. She states her constipation continues to be transient. She denies bladder issues. She denies SOB, cough, CP, fever. Her appetite is improving and she overall denies heartburn/nausea. We note her initial VS are stable.     CODE STATUS/ADVANCE DIRECTIVES DISCUSSION: DNR/DNI. Patient's living condition: lives in an assisted living facility ALLERGIES: Penicillins, Aspirin, Atenolol, Atorvastatin, Bupropion, Clindamycin, Codeine, Ibuprofen, Lovastatin, and Cephalexin PAST MEDICAL HISTORY:  has a past medical history of Adrenal adenoma, Arthritis, Depressive disorder, Hypertension, Multiple system atrophy P (H), Rheumatic fever, Small bowel obstruction (H), and Thyroid disease. PAST SURGICAL HISTORY:   has a past surgical history that includes orthopedic surgery; Thyroidectomy (2011); joint replacement (Right, 2003); Hysterectomy (2013); carpal tunnel release rt/lt (Bilateral, 2013); Spine surgery (1981); aaa repair (2015); and Laparoscopic cholecystectomy (N/A, 12/12/2018).FAMILY HISTORY: family history includes Coronary Artery Disease in her father and mother; Hypertension in her mother; Other Cancer in her brother; Parkinsonism in an other family member. SOCIAL HISTORY:   reports that she quit smoking about 26 years ago. Her smoking use included cigarettes. She smoked 0.50 packs per  day. She has never used smokeless tobacco. She reports that she does not drink alcohol or use drugs.  Post Discharge Medication Reconciliation Status: discharge medications reconciled and changed, per note/orders  Current Outpatient Medications   Medication Sig Dispense Refill     acetaminophen (TYLENOL) 325 MG tablet Take 3 tablets (975 mg) by mouth every 6 hours       bisacodyl (DULCOLAX) 10 MG suppository Place 10 mg rectally daily as needed for constipation       calcium carbonate (OS-YADIRA) 500 MG tablet Take 1 tablet (500 mg) by mouth 2 times daily       calcium carbonate (TUMS) 500 MG chewable tablet Take 1 chew tab by mouth daily as needed (indigestion)       carbidopa-levodopa (SINEMET)  MG tablet Take 1 tablet by mouth 3 times daily 180 tablet 11     clotrimazole (LOTRIMIN) 1 % external cream Apply topically 2 times daily as needed       diclofenac (VOLTAREN) 1 % topical gel Apply 2 g topically 4 times daily for 2 days       DULoxetine (CYMBALTA) 20 MG capsule Take 60 mg by mouth daily       enoxaparin ANTICOAGULANT (LOVENOX) 30 MG/0.3ML syringe Inject 0.3 mLs (30 mg) Subcutaneous every 12 hours for 28 days       gabapentin (NEURONTIN) 300 MG capsule Take 1 capsule (300 mg) by mouth 2 times daily       gabapentin (NEURONTIN) 400 MG capsule Take 2 capsules (800 mg) by mouth At Bedtime       hydrochlorothiazide (HYDRODIURIL) 25 MG tablet TAKE 1 TABLET BY MOUTH EVERY DAY DX HYPERTENSION AND LE EDEMA 31 tablet 11     ibuprofen (ADVIL/MOTRIN) 400 MG tablet Take 1 tablet (400 mg) by mouth every 6 hours as needed for moderate pain       levothyroxine (SYNTHROID/LEVOTHROID) 100 MCG tablet Take 1 tablet (100 mcg) by mouth daily       Lidocaine (LIDOCARE) 4 % Patch Place 2-3 patches onto the skin every 24 hours Apply to right hip and low back for pain  To prevent lidocaine toxicity, patient should be patch free for 12 hrs daily.       losartan (COZAAR) 50 MG tablet TAKE 1 TABLET BY MOUTH EVERY MORNING 31 tablet  11     melatonin 3 MG tablet Take 1 tablet (3 mg) by mouth every 24 hours       menthol (ICY HOT) 5 % PTCH Apply 1 patch topically every 8 hours as needed for muscle soreness       methocarbamol (ROBAXIN) 500 MG tablet Take 1 tablet (500 mg) by mouth 2 times daily       oxyCODONE (ROXICODONE) 5 MG tablet Take 0.5 tablets (2.5 mg) by mouth every 6 hours as needed for severe pain 16 tablet 0     polyethylene glycol (MIRALAX) 17 GM/Dose powder Take 17 g by mouth daily 510 g      polyethylene glycol-propylene glycol (SYSTANE ULTRA) 0.4-0.3 % SOLN ophthalmic solution Place 2 drops into both eyes 3 times daily (and additionally as needed/requested)       QUEtiapine (SEROQUEL) 25 MG tablet Take 1 tablet (25 mg) by mouth At Bedtime       QUEtiapine (SEROQUEL) 25 MG tablet Take 0.5 tablets (12.5 mg) by mouth 3 times daily as needed (agitation)       senna-docusate (SENOKOT-S/PERICOLACE) 8.6-50 MG tablet Take 1-2 tablets by mouth 2 times daily       vitamin D3 (CHOLECALCIFEROL) 50 mcg (2000 units) tablet Take 1 tablet (50 mcg) by mouth daily       VITAMIN D3 25 MCG (1000 UT) tablet TAKE 2 TABLETS (2000IU) BY MOUTH EVERY DAY DX OSTEOPOROSIS 60 tablet 11     ROS: Limited secondary to cognitive impairment but today pt reports the abive and 10 point ROS of systems including Constitutional, Eyes, Respiratory, Cardiovascular, Gastroenterology, Genitourinary, Integumentary, Musculoskeletal, Psychiatric were all negative except for pertinent positives noted in my HPI.    Vitals:  BP (!) 143/70   Pulse 75   Temp 96  F (35.6  C)   Resp 18   Wt 74.4 kg (164 lb)   SpO2 94%   BMI 26.47 kg/m    Exam:  GENERAL APPEARANCE: Alert, in no distress, cooperative.   EYES/ENT: EOM normal on camera, hearing acuity [place].  RESP: Respiratory effort appears unlabored over video screen, no respiratory distress apparent on video, On RA.   CV: Edema appears 0+ BLE via video. Color appears pale.  ABDOMEN: Appears non-distended, rounded.  SKIN: Skin  appears baseline w/ video inspection. No wounds/rashes noted.   NEURO: CN II-XII at patient's baseline, MARMOLEJO at baseline on video. Sensation baseline PPS.  PSYCH: Insight, judgement, and memory are impaired at baseline, affect and mood are happy/engaged.    Lab/Diagnostic data:  Most Recent 3 CBC's:  Recent Labs   Lab Test 12/29/20  1411 12/28/20  0843 12/25/20  0828 12/24/20  0800   WBC  --  4.1 6.4 5.7   HGB 11.3* 11.5* 12.1 12.2   MCV  --  101* 101* 98   PLT  --  143* 134* 151   Most Recent 3 BMP's:  Recent Labs   Lab Test 12/29/20  0634 12/28/20  0843 12/27/20  0851 12/25/20  0828 12/25/20  0828 12/24/20  0800   NA  --  138  --   --  139 139   POTASSIUM 3.6 3.6 4.3   < > 3.8 4.0   CHLORIDE  --  102  --   --  105 104   CO2  --  32  --   --  31 30   BUN  --  21  --   --  18 26   CR  --  0.54  --   --  0.51* 0.53   ANIONGAP  --  3  --   --  4 4   YADIRA  --  8.6  --   --  8.6 8.5   GLC  --  138*  --   --  88 87    < > = values in this interval not displayed.       ASSESSMENT/PLAN:  Closed nondisplaced zone I fracture of sacrum with routine healing, subsequent encounter  Fracture of multiple pubic rami with routine healing, right  Multiple system atrophy P (H)  Lewy body dementia without behavioral disturbance (H)  Primary osteoarthritis involving multiple joints  Episode of recurrent major depressive disorder, unspecified depression episode severity (H)  VAMSHI (generalized anxiety disorder)  Dizziness  Chronic bilateral low back pain with bilateral sciatica  Slow transit constipation  Polypharmacy  Fall, subsequent encounter  Essential hypertension  Acute-on-chronic. Ongoing.     We suspect that Mehreen's fall (again) is a sign of further MSA and dementia progression.   We note that w/ her dementia, she will experience delusions and paranoia, and we will therefore renew her seroquel. Noted PRN orders for antipsychotic medications are limited to 14 days. Face to Face encounter done today. Evaluation of  medication  indicates it is appropriate and necessary to continue this medication for 14 days. We will also consult in-house psych.   We note her pelvic fractures and trauma are tenuous, and will trend blood work.   We note that she should optimize Tylenol dosing to reduce dependence on opioid interventions as she heals, and we also want her pain controlled well enough for her to thrive in rehab. Will schedule and clarify Tylenol.   We coordinated care with nursing and clarified several other orders.  Follow-up w/in 1 week or as needed.     Orders:  1. RENEW PRN Seroquel x 14 days. Dx: atypical psychosis.   2. CBC, CMP x1 on 1/4. Dx: pelvic fractures.   3. In-house psych consult x1. Dx: Dementia.  4. Change Tylenol to 1000mg PO TID. Dx: Pelvic fractures.   5. Tylenol 650mg PO every day PRN. Dx: pain/fever.   6. Clarify. SennaS 2 tabs PO BID PRN. Dx: constipation.     Total time spent with patient visit at the skilled nursing facility was 35 min including patient visit and review of past records. Greater than 50% of total time spent with counseling and coordinating care with nursing as noted above.     Video-Visit Details  Type of service:  Video Visit  Video Start Time: 1554  Video End Time (time video stopped): 1601  Originating Location (pt. Location): [facility]  Distant Location (provider location):  [facility] GERIATRIC TRANSITIONAL CARE   Mode of Communication:  Video Conference.    Electronically signed by:  Dr. Lisa Parry, APRN CNP DNP

## 2020-12-30 NOTE — PROGRESS NOTES
"Care Management Discharge Note    Discharge Date: 12/30/20       Discharge Disposition: Transitional Care  [facility]   [address] [place] [address]  Ph:[phone] f:[phone]    Discharge Services: TCU    Discharge DME: None    Discharge Transportation: [facility] Stretcher transport arranged [phone] due to dementia and lack of trunk support. WILLIS completed PCS, faxed to [facility] transport billing, and placed in hard chart.     Private pay costs discussed: Not applicable - family aware pt has MA and transport will be covered.     PAS Confirmation Code:  [id number]  Patient/family educated on Medicare website which has current facility and service quality ratings: yes    Education Provided on the Discharge Plan:  Yes  Persons Notified of Discharge Plans: Pt's daughter Eladio, charge RN, primary medical team, bedside RN, pt, and TCU admissions   Patient/Family in Agreement with the Plan: yes    Handoff Referral Completed: Yes    Additional Information:  SW received email from pt's daughter with some questions for TCU:  \"Hello,  Would she be able to get a air bed or order for online? There beds are another big problem for her and being in bed all the time could make it more uncomfortable thing for her.   I see a video visit was made for my mom at 6am to see CNP at [facility]... is this available for any of us kids to join in? Do they normally make them that early? I would like to for sure be present. Do you know if I can just join or have to be invited... I do see it on her mychart.\"    WILLIS addressed all questions with Leia in admissions at [facility] [phone]. MD will need to have an order for an air mattress - WILLIS will request this from MD. Per Leia, no appointment has been made for pt at TCU and this must have been from her RUPERTO. Leia requested pt come around 1400.  WILLIS discussed with bedside RN, per RN stretcher transport is requested for dementia and lack of trunk support.  WILLIS called " [facility] transport [phone], stretcher ride arranged for 1400.  WILLIS updated Eladio with discharge time and information, IMM reviewed.     ANGIE Reyes, [facility]  Adult Acute Care   Ph:[phone]  Pager [phone]

## 2020-12-30 NOTE — PLAN OF CARE
Occupational Therapy Discharge Summary    Reason for therapy discharge:    Discharged to transitional care facility.    Progress towards therapy goal(s). See goals on Care Plan in [facility] electronic health record for goal details.  Goals partially met.  Barriers to achieving goals:   discharge from facility.    Therapy recommendation(s):    Continued therapy is recommended.  Rationale/Recommendations:  Pt is below baseline for functional mobility and ADLs. Pt would benefit from further skilled therapies to progress towards PLOF. .

## 2020-12-30 NOTE — LETTER
"    12/30/2020        RE: Mehreen Camara  [facility]  [address]  [facility] [address]        [facility] Video Visit  Mehreen Camara is a 80 year old female who is being evaluated via a billable video visit due to the restrictions of the COVID19 pandemic.The patient has been notified of following: \"This video visit will be conducted via a call between you and your provider. We have found that certain health care needs can be provided without the need for an in-person physical exam.  This service lets us provide the care you need with a video conversation.  If a prescription is necessary we can send it directly to your pharmacy.  If lab work is needed we can place an order for that and you can then stop by our lab to have the test done at a later time. If during the course of the call the provider feels a video visit is not appropriate, you will not be charged for this service.\"     Proivder has received verbal consent for a Video Visit from the patient? Yes    Patient/facility would like the video invitation sent by: Send to e-mail at: [email or web address]    This visit took place virtually, with the patient located at [facility].  ________________________________________________  Video Start Time: 1554  TCU Admission  PRIMARY CARE PROVIDER AND CLINIC:  Lisa Parry, APRN CNP, [address] [place] [address] / [facility] [id number]  Chief Complaint   Patient presents with     Video Visit     [facility] F/U   [place] MRN: [id number]. Mehreen Camara  is a 80 year old  (1940), admitted to the above facility from  [facility]. Hospital stay 12/23 through 12/30. Admitted to this facility for  rehab, medical management and nursing care. HPI information obtained from: facility chart records, facility staff, patient report, [facility] chart review and Care Everywhere [facility] chart review.     Brief Summary of Hospital " Course: Mehreen experienced a fall at [facility] on 12/23 and was transferred to [facility] for evaluation. She was found to have a right sacral ala and right pubic ramus fractures. She also had small pelvic edema/hemorrhage in the space of [place]. She was treated conservatively and will follow-up w/ ortho in 3 weeks.    Updates since admission to transitional care unit: Mehreen presented to TCU on 12/30 s/p the above events. She is allow to be WBAT, but we note with her underlying joint issues, this will be a challenge. Her new pain  Medication regimen is significant, and will need careful monitoring given her MSA (which ultimately may have been the cause of her fall). Today, Mehreen states sometimes the pain is really rough, and nursing states she is able to ask for PRNs. She states her constipation continues to be transient. She denies bladder issues. She denies SOB, cough, CP, fever. Her appetite is improving and she overall denies heartburn/nausea. We note her initial VS are stable.     CODE STATUS/ADVANCE DIRECTIVES DISCUSSION: DNR/DNI. Patient's living condition: lives in an assisted living facility ALLERGIES: Penicillins, Aspirin, Atenolol, Atorvastatin, Bupropion, Clindamycin, Codeine, Ibuprofen, Lovastatin, and Cephalexin PAST MEDICAL HISTORY:  has a past medical history of Adrenal adenoma, Arthritis, Depressive disorder, Hypertension, Multiple system atrophy P (H), Rheumatic fever, Small bowel obstruction (H), and Thyroid disease. PAST SURGICAL HISTORY:   has a past surgical history that includes orthopedic surgery; Thyroidectomy (2011); joint replacement (Right, 2003); Hysterectomy (2013); carpal tunnel release rt/lt (Bilateral, 2013); Spine surgery (1981); aaa repair (2015); and Laparoscopic cholecystectomy (N/A, 12/12/2018).FAMILY HISTORY: family history includes Coronary Artery Disease in her father and mother; Hypertension in her mother; Other Cancer in her brother; Parkinsonism in an other family member. SOCIAL  HISTORY:   reports that she quit smoking about 26 years ago. Her smoking use included cigarettes. She smoked 0.50 packs per day. She has never used smokeless tobacco. She reports that she does not drink alcohol or use drugs.  Post Discharge Medication Reconciliation Status: discharge medications reconciled and changed, per note/orders  Current Outpatient Medications   Medication Sig Dispense Refill     acetaminophen (TYLENOL) 325 MG tablet Take 3 tablets (975 mg) by mouth every 6 hours       bisacodyl (DULCOLAX) 10 MG suppository Place 10 mg rectally daily as needed for constipation       calcium carbonate (OS-YADIRA) 500 MG tablet Take 1 tablet (500 mg) by mouth 2 times daily       calcium carbonate (TUMS) 500 MG chewable tablet Take 1 chew tab by mouth daily as needed (indigestion)       carbidopa-levodopa (SINEMET)  MG tablet Take 1 tablet by mouth 3 times daily 180 tablet 11     clotrimazole (LOTRIMIN) 1 % external cream Apply topically 2 times daily as needed       diclofenac (VOLTAREN) 1 % topical gel Apply 2 g topically 4 times daily for 2 days       DULoxetine (CYMBALTA) 20 MG capsule Take 60 mg by mouth daily       enoxaparin ANTICOAGULANT (LOVENOX) 30 MG/0.3ML syringe Inject 0.3 mLs (30 mg) Subcutaneous every 12 hours for 28 days       gabapentin (NEURONTIN) 300 MG capsule Take 1 capsule (300 mg) by mouth 2 times daily       gabapentin (NEURONTIN) 400 MG capsule Take 2 capsules (800 mg) by mouth At Bedtime       hydrochlorothiazide (HYDRODIURIL) 25 MG tablet TAKE 1 TABLET BY MOUTH EVERY DAY DX HYPERTENSION AND LE EDEMA 31 tablet 11     ibuprofen (ADVIL/MOTRIN) 400 MG tablet Take 1 tablet (400 mg) by mouth every 6 hours as needed for moderate pain       levothyroxine (SYNTHROID/LEVOTHROID) 100 MCG tablet Take 1 tablet (100 mcg) by mouth daily       Lidocaine (LIDOCARE) 4 % Patch Place 2-3 patches onto the skin every 24 hours Apply to right hip and low back for pain  To prevent lidocaine toxicity, patient  should be patch free for 12 hrs daily.       losartan (COZAAR) 50 MG tablet TAKE 1 TABLET BY MOUTH EVERY MORNING 31 tablet 11     melatonin 3 MG tablet Take 1 tablet (3 mg) by mouth every 24 hours       menthol (ICY HOT) 5 % PTCH Apply 1 patch topically every 8 hours as needed for muscle soreness       methocarbamol (ROBAXIN) 500 MG tablet Take 1 tablet (500 mg) by mouth 2 times daily       oxyCODONE (ROXICODONE) 5 MG tablet Take 0.5 tablets (2.5 mg) by mouth every 6 hours as needed for severe pain 16 tablet 0     polyethylene glycol (MIRALAX) 17 GM/Dose powder Take 17 g by mouth daily 510 g      polyethylene glycol-propylene glycol (SYSTANE ULTRA) 0.4-0.3 % SOLN ophthalmic solution Place 2 drops into both eyes 3 times daily (and additionally as needed/requested)       QUEtiapine (SEROQUEL) 25 MG tablet Take 1 tablet (25 mg) by mouth At Bedtime       QUEtiapine (SEROQUEL) 25 MG tablet Take 0.5 tablets (12.5 mg) by mouth 3 times daily as needed (agitation)       senna-docusate (SENOKOT-S/PERICOLACE) 8.6-50 MG tablet Take 1-2 tablets by mouth 2 times daily       vitamin D3 (CHOLECALCIFEROL) 50 mcg (2000 units) tablet Take 1 tablet (50 mcg) by mouth daily       VITAMIN D3 25 MCG (1000 UT) tablet TAKE 2 TABLETS (2000IU) BY MOUTH EVERY DAY DX OSTEOPOROSIS 60 tablet 11     ROS: Limited secondary to cognitive impairment but today pt reports the abive and 10 point ROS of systems including Constitutional, Eyes, Respiratory, Cardiovascular, Gastroenterology, Genitourinary, Integumentary, Musculoskeletal, Psychiatric were all negative except for pertinent positives noted in my HPI.    Vitals:  BP (!) 143/70   Pulse 75   Temp 96  F (35.6  C)   Resp 18   Wt 74.4 kg (164 lb)   SpO2 94%   BMI 26.47 kg/m    Exam:  GENERAL APPEARANCE: Alert, in no distress, cooperative.   EYES/ENT: EOM normal on camera, hearing acuity [place].  RESP: Respiratory effort appears unlabored over video screen, no respiratory distress apparent on video,  On RA.   CV: Edema appears 0+ BLE via video. Color appears pale.  ABDOMEN: Appears non-distended, rounded.  SKIN: Skin appears baseline w/ video inspection. No wounds/rashes noted.   NEURO: CN II-XII at patient's baseline, MARMOLEJO at baseline on video. Sensation baseline PPS.  PSYCH: Insight, judgement, and memory are impaired at baseline, affect and mood are happy/engaged.    Lab/Diagnostic data:  Most Recent 3 CBC's:  Recent Labs   Lab Test 12/29/20  1411 12/28/20  0843 12/25/20  0828 12/24/20  0800   WBC  --  4.1 6.4 5.7   HGB 11.3* 11.5* 12.1 12.2   MCV  --  101* 101* 98   PLT  --  143* 134* 151   Most Recent 3 BMP's:  Recent Labs   Lab Test 12/29/20  0634 12/28/20  0843 12/27/20  0851 12/25/20  0828 12/25/20  0828 12/24/20  0800   NA  --  138  --   --  139 139   POTASSIUM 3.6 3.6 4.3   < > 3.8 4.0   CHLORIDE  --  102  --   --  105 104   CO2  --  32  --   --  31 30   BUN  --  21  --   --  18 26   CR  --  0.54  --   --  0.51* 0.53   ANIONGAP  --  3  --   --  4 4   YADIRA  --  8.6  --   --  8.6 8.5   GLC  --  138*  --   --  88 87    < > = values in this interval not displayed.       ASSESSMENT/PLAN:  Closed nondisplaced zone I fracture of sacrum with routine healing, subsequent encounter  Fracture of multiple pubic rami with routine healing, right  Multiple system atrophy P (H)  Lewy body dementia without behavioral disturbance (H)  Primary osteoarthritis involving multiple joints  Episode of recurrent major depressive disorder, unspecified depression episode severity (H)  VAMSHI (generalized anxiety disorder)  Dizziness  Chronic bilateral low back pain with bilateral sciatica  Slow transit constipation  Polypharmacy  Fall, subsequent encounter  Essential hypertension  Acute-on-chronic. Ongoing.     We suspect that Mehreen's fall (again) is a sign of further MSA and dementia progression.   We note that w/ her dementia, she will experience delusions and paranoia, and we will therefore renew her seroquel. Noted PRN orders for  antipsychotic medications are limited to 14 days. Face to Face encounter done today. Evaluation of  medication indicates it is appropriate and necessary to continue this medication for 14 days. We will also consult in-house psych.   We note her pelvic fractures and trauma are tenuous, and will trend blood work.   We note that she should optimize Tylenol dosing to reduce dependence on opioid interventions as she heals, and we also want her pain controlled well enough for her to thrive in rehab. Will schedule and clarify Tylenol.   We coordinated care with nursing and clarified several other orders.  Follow-up w/in 1 week or as needed.     Orders:  1. RENEW PRN Seroquel x 14 days. Dx: atypical psychosis.   2. CBC, CMP x1 on 1/4. Dx: pelvic fractures.   3. In-house psych consult x1. Dx: Dementia.  4. Change Tylenol to 1000mg PO TID. Dx: Pelvic fractures.   5. Tylenol 650mg PO every day PRN. Dx: pain/fever.   6. Clarify. SennaS 2 tabs PO BID PRN. Dx: constipation.     Total time spent with patient visit at the skilled nursing facility was 35 min including patient visit and review of past records. Greater than 50% of total time spent with counseling and coordinating care with nursing as noted above.     Video-Visit Details  Type of service:  Video Visit  Video Start Time: 1554  Video End Time (time video stopped): 1601  Originating Location (pt. Location): [facility]  Distant Location (provider location):  [facility]   Mode of Communication:  Video Conference.    Electronically signed by:  HAILEE Gilbert CNP [facility]            Sincerely,        HAILEE Langley CNP

## 2020-12-30 NOTE — PLAN OF CARE
Vitals:    12/29/20 1204 12/29/20 1334 12/29/20 1430 12/29/20 1500   BP: 119/59 (!) 84/39 116/62 136/69   BP Location: Right arm Right arm Right arm Right arm   Pulse: 79 80  84   Resp: 18   17   Temp: 96.4  F (35.8  C)   97.8  F (36.6  C)   TempSrc: Oral   Axillary   SpO2: 95% 93%  94%   Weight:       Height:           Pt A&O to self, disoriented x3. Intermittently agitated and confused.   Pt reoriented to place and situation. PRN Zyprexa given once.   PRN Oxycodone x2 for pain. Lidocaine patch on L shoulder & R hip.   BP dropped 84/39, bolus 500ml given. Recheck 116/62 and 136/69.  MIVF infusing 75ml/hr via PIV. Incontinent of urine. + flatus, no BM this shift.  Fair appetite, ate breakfast and dinner. Refused dinner at the moment.   Refused some repositioning, on air mattress. Continue plan of care.

## 2020-12-30 NOTE — PLAN OF CARE
Patient was confuse, agitated, refused purewick. Not in respiratory distress, clear LS, VSS on RA. +BS, soft non distended abdomen, denies nausea, no BM. Urine incontinence, changed x3,  In pain mostly during repositioning; Oxycodone 2.5 mg given 1x, and scheduled Robaxin and Tylenol given. Bed alarm activated. Is able to sleep around 0100. Purewick placed at 0600, calm and cooperative.  Continue poc.   Vitals:    12/29/20 1430 12/29/20 1500 12/29/20 1951 12/29/20 2338   BP: 116/62 136/69 (!) 142/67 129/63   BP Location: Right arm Right arm Right arm Right arm   Pulse:  84 78 69   Resp:  17 18 18   Temp:  97.8  F (36.6  C) 96.9  F (36.1  C) 96.5  F (35.8  C)   TempSrc:  Axillary Oral Oral   SpO2:  94% 93% 93%   Weight:       Height:

## 2020-12-31 ENCOUNTER — RECORDS - HEALTHEAST (OUTPATIENT)
Dept: LAB | Facility: CLINIC | Age: 80
End: 2020-12-31

## 2020-12-31 NOTE — TELEPHONE ENCOUNTER
DIAGNOSIS: pubic rami fxs and sacral fx, managing nonop 3 week follow up   APPOINTMENT DATE: 1.20.21    NOTES STATUS DETAILS   OFFICE NOTE from referring provider N/A    OFFICE NOTE from other specialist N/A    DISCHARGE SUMMARY from hospital N/A    DISCHARGE REPORT from the ER Internal 12.23.20 Dr. Felder [facility]    OPERATIVE REPORT N/A    EMG report N/A    MEDICATION LIST Internal    MRI N/A    DEXA (osteoporosis/bone health) N/A    CT SCAN Internal 12.23.20 pelvis  12.23.20 lumbar  10.10.20 pelvis     XRAYS (IMAGES & REPORTS) Internal 12.23.20 pelvis  10.10.20 pelvis  8.5.20 pelvis  More in EPIC

## 2021-01-04 ENCOUNTER — VIRTUAL VISIT (OUTPATIENT)
Dept: GERIATRICS | Facility: CLINIC | Age: 81
End: 2021-01-04
Payer: COMMERCIAL

## 2021-01-04 VITALS
SYSTOLIC BLOOD PRESSURE: 138 MMHG | DIASTOLIC BLOOD PRESSURE: 81 MMHG | TEMPERATURE: 97.8 F | RESPIRATION RATE: 16 BRPM | HEART RATE: 66 BPM | OXYGEN SATURATION: 93 % | BODY MASS INDEX: 26.03 KG/M2 | WEIGHT: 161.3 LBS

## 2021-01-04 DIAGNOSIS — M15.0 PRIMARY OSTEOARTHRITIS INVOLVING MULTIPLE JOINTS: ICD-10-CM

## 2021-01-04 DIAGNOSIS — I71.40 ANEURYSM OF ABDOMINAL VESSEL (H): ICD-10-CM

## 2021-01-04 DIAGNOSIS — G23.2 MULTIPLE SYSTEM ATROPHY P (H): ICD-10-CM

## 2021-01-04 DIAGNOSIS — S32.110D CLOSED NONDISPLACED ZONE I FRACTURE OF SACRUM WITH ROUTINE HEALING, SUBSEQUENT ENCOUNTER: ICD-10-CM

## 2021-01-04 DIAGNOSIS — F02.80 LEWY BODY DEMENTIA WITHOUT BEHAVIORAL DISTURBANCE (H): ICD-10-CM

## 2021-01-04 DIAGNOSIS — F29 PSYCHOSIS, UNSPECIFIED PSYCHOSIS TYPE (H): ICD-10-CM

## 2021-01-04 DIAGNOSIS — F41.1 GAD (GENERALIZED ANXIETY DISORDER): ICD-10-CM

## 2021-01-04 DIAGNOSIS — G31.83 LEWY BODY DEMENTIA WITHOUT BEHAVIORAL DISTURBANCE (H): ICD-10-CM

## 2021-01-04 DIAGNOSIS — R42 DIZZINESS: ICD-10-CM

## 2021-01-04 DIAGNOSIS — Z79.899 POLYPHARMACY: ICD-10-CM

## 2021-01-04 DIAGNOSIS — S32.82XD MULTIPLE CLOSED FRACTURES OF PELVIS WITHOUT DISRUPTION OF PELVIC RING WITH ROUTINE HEALING, SUBSEQUENT ENCOUNTER: Primary | ICD-10-CM

## 2021-01-04 DIAGNOSIS — K59.01 SLOW TRANSIT CONSTIPATION: ICD-10-CM

## 2021-01-04 DIAGNOSIS — F33.9 EPISODE OF RECURRENT MAJOR DEPRESSIVE DISORDER, UNSPECIFIED DEPRESSION EPISODE SEVERITY (H): ICD-10-CM

## 2021-01-04 DIAGNOSIS — S32.591D FRACTURE OF MULTIPLE PUBIC RAMI WITH ROUTINE HEALING, RIGHT: ICD-10-CM

## 2021-01-04 LAB
ALBUMIN SERPL-MCNC: 2.7 G/DL (ref 3.5–5)
ALP SERPL-CCNC: 124 U/L (ref 45–120)
ALT SERPL W P-5'-P-CCNC: <9 U/L (ref 0–45)
ANION GAP SERPL CALCULATED.3IONS-SCNC: 7 MMOL/L (ref 5–18)
AST SERPL W P-5'-P-CCNC: 14 U/L (ref 0–40)
BILIRUB SERPL-MCNC: 0.3 MG/DL (ref 0–1)
BUN SERPL-MCNC: 21 MG/DL (ref 8–28)
CALCIUM SERPL-MCNC: 8.6 MG/DL (ref 8.5–10.5)
CHLORIDE BLD-SCNC: 103 MMOL/L (ref 98–107)
CO2 SERPL-SCNC: 31 MMOL/L (ref 22–31)
CREAT SERPL-MCNC: 0.66 MG/DL (ref 0.6–1.1)
ERYTHROCYTE [DISTWIDTH] IN BLOOD BY AUTOMATED COUNT: 13.2 % (ref 11–14.5)
GFR SERPL CREATININE-BSD FRML MDRD: >60 ML/MIN/1.73M2
GLUCOSE BLD-MCNC: 91 MG/DL (ref 70–125)
HCT VFR BLD AUTO: 33.4 % (ref 35–47)
HGB BLD-MCNC: 10.5 G/DL (ref 12–16)
MCH RBC QN AUTO: 31.7 PG (ref 27–34)
MCHC RBC AUTO-ENTMCNC: 31.4 G/DL (ref 32–36)
MCV RBC AUTO: 101 FL (ref 80–100)
PLATELET # BLD AUTO: 316 THOU/UL (ref 140–440)
PMV BLD AUTO: 10.4 FL (ref 8.5–12.5)
POTASSIUM BLD-SCNC: 3.9 MMOL/L (ref 3.5–5)
PROT SERPL-MCNC: 6.1 G/DL (ref 6–8)
RBC # BLD AUTO: 3.31 MILL/UL (ref 3.8–5.4)
SODIUM SERPL-SCNC: 141 MMOL/L (ref 136–145)
WBC: 5.4 THOU/UL (ref 4–11)

## 2021-01-04 PROCEDURE — 99309 SBSQ NF CARE MODERATE MDM 30: CPT | Mod: 95 | Performed by: NURSE PRACTITIONER

## 2021-01-04 RX ORDER — OXYCODONE HYDROCHLORIDE 5 MG/1
2.5 TABLET ORAL EVERY 4 HOURS PRN
Qty: 16 TABLET | Refills: 0 | Status: SHIPPED | OUTPATIENT
Start: 2021-01-04 | End: 2021-01-13

## 2021-01-04 NOTE — LETTER
"    1/4/2021        RE: Mehreen Camara  [facility]  [address]  [facility] [address]        [facility] Geriatrics Video Visit  Mehreen Camara is a 81 year old female who is being evaluated via a billable video visit due to the restrictions of the COVID19 pandemic.The patient has been notified of following: \"This video visit will be conducted via a call between you and your provider. We have found that certain health care needs can be provided without the need for an in-person physical exam.  This service lets us provide the care you need with a video conversation.  If a prescription is necessary we can send it directly to your pharmacy.  If lab work is needed we can place an order for that and you can then stop by our lab to have the test done at a later time. If during the course of the call the provider feels a video visit is not appropriate, you will not be charged for this service.\"     Proivder has received verbal consent for a Video Visit from the patient? Yes    Patient/facility would like the video invitation sent by: Send to e-mail at: [email or web address]    This visit took place virtually, with the patient located at [facility].  ________________________________________________  Video Start Time: 1254  Mehreen Camara complains of    Chief Complaint   Patient presents with     Video Visit     Nursing Home Acute   HPI/Subjective:  Mehreen seen today on follow-unit(s) after her admission to TCU last week. Since then, she has had some c/o dizziness intermittently, and nursing did report one episode of hypotension. We note she is on a heavier pain regimen (muscle relaxers etc) from her baseline, we also note that the hypotensive episode was secondary to position-change.     Today, Mehreen is calm, comfortable, and laying in her bed. She states that the pain is \"rough\" when she gets in the sling (ulysses lift device), but she is otherwise able " Cardiovascular/Thoracic Surgery Consultation: 1/10/2017      Requesting Physician: REINA intensivist    Reason for consultation: respiratory failure despite maximal support     Chief Complaint: respiratory failure    HPI:  Pt is intubated and sedated and history obtained from staff and ER notes   Pt has somewhat limited present history.  Apparently pt was at friends house last  evening and last known well time was around 4 pm. He then became less responsive.  Friend denies the use of drugs at that time.  Alcohol was apparently ingested.  EMS was contacted and pt was minimally responsive on their arrival.  Difficulty obtaining BP at that time.  Transported to [facility] in [place]?   Required intubation for continued resp failure.  CT scan did not reveal any acute intracranial process.   Then transported to [facility] for further care.  Pt stabilized BP on levo and vasopressin at this time.    Now sedated and intubated with BPs in the low 100s.   BPM.  Lactic acid continues to rise and now on bicarb gtt as well.  Dr Hernandes consulted for ecmo intervention for resp support in setting of ARDS/poss aspiration.      Past Medical History    Anxiety                                         6/16/2014     Cellulitis                                                      Comment: right leg    Back pain                                                     Past Surgical History    ARM DEBRIDEMENT                                               HAND SURGERY                                                  ANTERIOR CERVICAL DISCECTOMY W/ FUSION          04/15/2014      Comment: C5-6 and C6-7 discectomy with decompression of                spinal cord and nerve roots with C6 corpectomy                and placement of a corpectomy style cage with                local autograft bone and an anterior cervical                plate    CERVICAL FUSION                                 10/27/2015      Comment: C5 to C7 posterior spinal  "to do therapy and her other ADLs with her current regimen. She states her bowels are working well and her appetite is \"fair.\" She denies nausea, heartburn. She denies SOB, fever.     History: I have reviewed and updated the patient's Past Medical History, Social History, Family History and Medication List.  ALLERGIES: Penicillins, Aspirin, Atenolol, Atorvastatin, Bupropion, Clindamycin, Codeine, Ibuprofen, Lovastatin, and Cephalexin   Current Outpatient Medications   Medication Sig Dispense Refill     acetaminophen (TYLENOL) 325 MG tablet Take 3 tablets (975 mg) by mouth every 6 hours       bisacodyl (DULCOLAX) 10 MG suppository Place 10 mg rectally daily as needed for constipation       calcium carbonate (OS-YADIRA) 500 MG tablet Take 1 tablet (500 mg) by mouth 2 times daily       calcium carbonate (TUMS) 500 MG chewable tablet Take 1 chew tab by mouth daily as needed (indigestion)       carbidopa-levodopa (SINEMET)  MG tablet Take 1 tablet by mouth 3 times daily 180 tablet 11     clotrimazole (LOTRIMIN) 1 % external cream Apply topically 2 times daily as needed       diclofenac (VOLTAREN) 1 % topical gel Apply 2 g topically 4 times daily for 2 days       DULoxetine (CYMBALTA) 20 MG capsule Take 60 mg by mouth daily       enoxaparin ANTICOAGULANT (LOVENOX) 30 MG/0.3ML syringe Inject 0.3 mLs (30 mg) Subcutaneous every 12 hours for 28 days       gabapentin (NEURONTIN) 300 MG capsule Take 1 capsule (300 mg) by mouth 2 times daily       gabapentin (NEURONTIN) 400 MG capsule Take 2 capsules (800 mg) by mouth At Bedtime       hydrochlorothiazide (HYDRODIURIL) 25 MG tablet TAKE 1 TABLET BY MOUTH EVERY DAY DX HYPERTENSION AND LE EDEMA 31 tablet 11     ibuprofen (ADVIL/MOTRIN) 400 MG tablet Take 1 tablet (400 mg) by mouth every 6 hours as needed for moderate pain       levothyroxine (SYNTHROID/LEVOTHROID) 100 MCG tablet Take 1 tablet (100 mcg) by mouth daily       Lidocaine (LIDOCARE) 4 % Patch Place 2-3 patches onto the " fusion with                instrumentation    POSTERIOR SPINAL FUSION                         10/20/2016      Comment: L5-S1 Carroll decompression and posterior spinal                fusion with instrumentation with the use of                autograft bone    ALLERGIES:   Allergen Reactions   • Bee Sting ANAPHYLAXIS     Patient carries epi-pen.   • Tylenol With Codeine Nausea & Vomiting         Current Facility-Administered Medications   Medication   • DEXTROSE 5 % IV SOLN Pyxis Override   • dextrose 50 % injection 25 g   • norepinephrine (LEVOPHED) 4 mg in sodium chloride 0.9% 250 mL infusion   • propofol (DIPRIVAN) infusion   • vasopressin (PITRESSIN) 50 Units in sodium chloride 0.9 % 50 mL infusion   • sodium bicarbonate 150 mEq in dextrose 5% 1000 mL infusion   • MIDAZolam (VERSED) 100 mg in sodium chloride 0.9% 100 mL infusion   • sodium chloride (PF) 0.9 % injection 2 mL   • sodium chloride (PF) 0.9 % injection 2 mL   • chlorhexidine gluconate (PERIDEX) 0.12 % solution 15 mL    And   • chlorhexidine gluconate (PERIDEX) 0.12 % solution 15 mL   • sodium chloride 0.9% infusion   • sodium chloride 0.9% infusion   • fentaNYL 1000 mcg in sodium chloride 0.9% 100 mL infusion premix   • petrolatum(white)/mineral oil/NaCL (REFRESH PM, OCULAR LUBRICANT) ophthalmic ointment 1 application   • SODIUM CHLORIDE 0.9 % IV SOLN Pyxis Override   • CISatracurium (NIMBEX) injection 19,000 mcg    And   • CISatracurium (NIMBEX) injection 9,600 mcg    And   • CISatracurium (NIMBEX) 200,000 mcg in 100 mL infusion   • famotidine (PEPCID) injection 20 mg   • meropenem (MERREM) 1 g in sodium chloride 0.9 % 100 mL IVPB   • VANCOMYCIN - PHARMACIST MONITORED   • hydroCORTisone (solu-CORTEF) PF injection 100 mg   • vancomycin 1,500 mg in sodium chloride 0.9% 250 mL IVPB    Followed by   • [START ON 1/11/2017] vancomycin 1,250 mg in sodium chloride 0.9% 250 mL IVPB   • ALBUMIN HUMAN 5 % IV SOLN Pyxis Override   • sodium chloride 0.9% infusion    • sodium chloride 0.9% infusion   • SODIUM BICARBONATE 8.4 % IV SOLN Pyxis Override       History   Smoking Status   • Former Smoker   • Packs/day: 0.50   • Years: 18.00   • Types: Cigarettes   • Quit date: 10/16/2016   Smokeless Tobacco   • Former User   • Types: Chew   • Quit date: 5/20/2016     Comment: patient quit prior to surgery 10/20/2016       History   Alcohol Use   • 3.6 oz/week   • 6 Cans of beer, 0 Standard drinks or equivalent per week     Comment: socially       History   Drug Use   • Yes   • Special: Marijuana     Comment: several times per / last about 1 week ago       Occupation: unable to obtain  Living situation: unable to obtain    Family History   Problem Relation Age of Onset   • Liver Disease Mother    • Diabetes Father    • High blood pressure Father    • High cholesterol Father    • Heart disease Father      Family history reviewed.  Pertinent cardiac family history: unable to obtain    Review of Systems:  Unable to obtain at this time due to pts sedation     Physical Exam:    Visit Vitals   • /48   • Pulse 155   • Temp 101.9 °F (38.8 °C) (Oral)   • Resp 21   • Wt 95.2 kg   • SpO2 (!) 72%   • BMI 30.11 kg/m2     Ht Readings from Last 1 Encounters:   01/10/17 5' 10\" (1.778 m)     Wt Readings from Last 1 Encounters:   01/10/17 95.2 kg     Body mass index is 30.11 kg/(m^2).    Tele: ST 150s     General: male in  acute respiratory distress, intubated , well developed and well nourished  Eyes: normal sclerae and normal conjunctivae, EOMs intact   Mouth: intubated, tongue midline, mucous membranes moist  Neck: no trachea deviation/tenderness/no lymphadneopathy  Cardiovascular:normal S1/S2, no murmur, no edema and decreased pedal pulses  Extremities: no stasis changes in bilateral lower extremities and normal strength in bilateral lower extremities   Respiratory: coarse BS bilat throughout the lung fields   Abdomen: no tenderness and no masses, non distended  Skin: warm/dry, no  skin every 24 hours Apply to right hip and low back for pain  To prevent lidocaine toxicity, patient should be patch free for 12 hrs daily.       losartan (COZAAR) 50 MG tablet TAKE 1 TABLET BY MOUTH EVERY MORNING 31 tablet 11     melatonin 3 MG tablet Take 1 tablet (3 mg) by mouth every 24 hours       menthol (ICY HOT) 5 % PTCH Apply 1 patch topically every 8 hours as needed for muscle soreness       methocarbamol (ROBAXIN) 500 MG tablet Take 1 tablet (500 mg) by mouth 2 times daily       oxyCODONE (ROXICODONE) 5 MG tablet Take 0.5 tablets (2.5 mg) by mouth every 6 hours as needed for severe pain 16 tablet 0     polyethylene glycol (MIRALAX) 17 GM/Dose powder Take 17 g by mouth daily 510 g      polyethylene glycol-propylene glycol (SYSTANE ULTRA) 0.4-0.3 % SOLN ophthalmic solution Place 2 drops into both eyes 3 times daily (and additionally as needed/requested)       QUEtiapine (SEROQUEL) 25 MG tablet Take 1 tablet (25 mg) by mouth At Bedtime       QUEtiapine (SEROQUEL) 25 MG tablet Take 0.5 tablets (12.5 mg) by mouth 3 times daily as needed (agitation)       senna-docusate (SENOKOT-S/PERICOLACE) 8.6-50 MG tablet Take 1-2 tablets by mouth 2 times daily       vitamin D3 (CHOLECALCIFEROL) 50 mcg (2000 units) tablet Take 1 tablet (50 mcg) by mouth daily       VITAMIN D3 25 MCG (1000 UT) tablet TAKE 2 TABLETS (2000IU) BY MOUTH EVERY DAY DX OSTEOPOROSIS 60 tablet 11     REVIEW OF SYSTEMS: Limited secondary to cognitive impairment but today pt reports the above and 4 point ROS including Respiratory, CV, GI and , other than that noted in the HPI,  is negative    Objective:  /81   Pulse 66   Temp 97.8  F (36.6  C)   Resp 16   Wt 73.2 kg (161 lb 4.8 oz)   SpO2 93%   BMI 26.03 kg/m    GENERAL APPEARANCE: Alert, in no distress, cooperative.   EYES/ENT: EOM normal on camera, hearing acuity [place].  RESP: Respiratory effort appears unlabored over video screen, no respiratory distress apparent on video, On RA.   CV:  Edema appears 0+ BLE via video. Color appears pale.  ABDOMEN: Appears non-distended, rounded  SKIN: Skin appears baseline w/ video inspection. No wounds/rashes noted.    NEURO: CN II-XII at patient's baseline, MARMOLEJO at baseline on video. Sensation baseline PPS.  PSYCH: Insight, judgement, and memory are baseline impaired, affect and mood are calm/engaged.    Laboratory/Diagnostics: Reviewed in Epic by provider today.     Assessment/Plan:  Multiple closed fractures of pelvis without disruption of pelvic ring with routine healing, subsequent encounter  Closed nondisplaced zone I fracture of sacrum with routine healing, subsequent encounter  Fracture of multiple pubic rami with routine healing, right  Multiple system atrophy P (H)  Lewy body dementia without behavioral disturbance (H)  Psychosis, unspecified psychosis type (H)  Aneurysm of abdominal vessel (H)  Primary osteoarthritis involving multiple joints  Episode of recurrent major depressive disorder, unspecified depression episode severity (H)  Dizziness  VAMSHI (generalized anxiety disorder)  Slow transit constipation  Polypharmacy  Acute-on-chronic. Ongoing.     We note that Mehreen's MSA is likely progressing and affecting her mood, pain, dizziness, and her ability to rehab.     We note in prior discussions with neurology, that it may benefit Mehreen to completely wean from Sinemet to assist with BP control. Family has stated that this was troublesome prior for her ability to mobilize, therefore we will hold off on this change given she is trying to rehab.    We note that she is calm and not c/o hallucinations or having significant paranoia while on Seroquel. We will continue this.    We will increase Oxycodone frequency, as this may help with the rehab process.     We note low total protein and albumin, with a fair appetite/intake. We will start protein supplementation.     Her blood work returned today, and appears to be grossly at baseline or WDL with mild  lesions  Psych: sedated   Neuro: sedated.  Pupils small 2 mm and sluggish     Labs:    Lab Results   Component Value Date    SODIUM 143 01/10/2017    POTASSIUM 4.2 01/10/2017    CHLORIDE 106 01/10/2017    CO2 20 (L) 01/10/2017    GLUCOSE 170 (H) 01/10/2017    BUN 31 (H) 01/10/2017    CREATININE 2.17 (H) 01/10/2017    CALCIUM 6.8 (L) 01/10/2017    ALBUMIN 2.1 (L) 01/10/2017    BILIRUBIN 0.5 01/10/2017    LACTA 10.4 (HH) 01/10/2017     (H) 01/10/2017    INR 1.5 01/10/2017     Lab Results   Component Value Date    PTT 33 (H) 01/10/2017    WBC 0.6 (LL) 01/10/2017    HGB 10.8 (L) 01/10/2017    HCT 33.6 (L) 01/10/2017     01/10/2017    BAND 6 01/10/2017    BNP 1247 (H) 01/10/2017    RAPDTR 0.46 (HH) 01/10/2017    CPK 2288 (H) 01/10/2017    NH3 <10 01/10/2017    LENY 68 01/10/2017       Diagnostics:  CXR post intubation 1/10/17  The left lower thorax is not entirely included on the image. Overlying  artifacts and monitor leads.     The endotracheal tube projects 8 cm superior to the kayden. The enteric  tube fades imperceptibly over the proximal stomach.     Cardiac contour and pulmonary vasculature are unchanged. Moderate interval  worsening in mixed interstitial and alveolar opacities scattered throughout  both lungs, predominantly in the perihilar distribution. No pleural  effusion or pneumothorax.     The left costophrenic sulcus is incompletely included on the field-of-view.  No definite pneumothorax.        IMPRESSION:      1. The endotracheal tube has been retracted and now projects 8 cm superior  to the kayden.  2. Interval worsening of mixed interstitial and alveolar opacities, ARDS  versus edema or infection.        Echocardiogram   No report available    Cardiology reports as \"normal LV\"    CT Scan for altered mental status  FINDINGS: Ventricles, basal cisterns and cortical sulci are symmetric and  normal in size for patient age. Brain parenchyma is of normal attenuation  with good gray/white  anemia. Her anemia is at baseline, and we note underlying AAA which was repaired in 2015.   Follow-up w/in 1 week or as needed.    Orders:  1. Increase Oxycodone to 2.5mg PO Q4h PRN. Dx: severe pain.  2. Prostat 30mL PO every day. Dx: malnutrition.     Video-Visit Details  Type of service:  Video Visit  Video Start Time: 1254  Video End Time (time video stopped): 1259  Originating Location (pt. Location): TCU  Distant Location (provider location):  [facility] TRANSITIONAL CARE   Mode of Communication:  Video Conference.    Electronically signed by:  HAILEE Gilbert CNP DNP              Sincerely,        HAILEE Langley CNP     differentiation throughout. No acute intracranial  hemorrhage or abnormal extra-axial fluid collection. Visualized paranasal  sinuses and mastoid air cells are clear. Skull and facial bones are intact.  Orbital and extracranial soft tissues are unremarkable.        IMPRESSION:   1. No acute intracranial hemorrhage, hydrocephalus, or CT evidence of acute  infarct.      Impression:  38 yo with questionable history that last known well time was 4 pm yesterday.   Mental status deterioration leading to obtunded state when arrival at ER.    Respiratory failure and ARDS look on CXR.  Poss aspiration     Plan:  Emergent placement of ECHO for ongoing respiratory support   Further risks, benefits, plans pre Dr Greta Cabrera PA-C  1/10/2017      CARDIOTHORACIC SURGERY    I have seen, examined, and evaluated this patient and agree with the data and physical exam as documented in the above note.    Additional pertinent exam findings: none    Plan: I have examined Mr. Vela,  38 yo man with severe hypoxic respiratory failure and ARDS, in need of emergent VV ecmo as a life saving maneuver. I have reviewed the laboratory data as well as CXR which shows complete white out.   Echo reportedly normal and I believe venovenous should be sufficient for oxygenation given normal cardiac function. We will go emergently to the OR without additional perioperative testing.       Risks (including bleeding, death, infection, stroke and potential use of blood products), benefits and alternatives were discussed with the patient and he agrees to proceed with the plan as documented above.    Brandon Hernandes MD

## 2021-01-04 NOTE — PROGRESS NOTES
"[facility] Geriatrics Video Visit  Mehreen Camara is a 81 year old female who is being evaluated via a billable video visit due to the restrictions of the COVID19 pandemic.The patient has been notified of following: \"This video visit will be conducted via a call between you and your provider. We have found that certain health care needs can be provided without the need for an in-person physical exam.  This service lets us provide the care you need with a video conversation.  If a prescription is necessary we can send it directly to your pharmacy.  If lab work is needed we can place an order for that and you can then stop by our lab to have the test done at a later time. If during the course of the call the provider feels a video visit is not appropriate, you will not be charged for this service.\"     Proivder has received verbal consent for a Video Visit from the patient? Yes    Patient/facility would like the video invitation sent by: Send to e-mail at: [email or web address]    This visit took place virtually, with the patient located at [facility].  ________________________________________________  Video Start Time: 1254  Mehreen Camara complains of    Chief Complaint   Patient presents with     Video Visit     Nursing Home Acute   HPI/Subjective:  Mehreen seen today on follow-unit(s) after her admission to [facility] last week. Since then, she has had some c/o dizziness intermittently, and nursing did report one episode of hypotension. We note she is on a heavier pain regimen (muscle relaxers etc) from her baseline, we also note that the hypotensive episode was secondary to position-change.     Today, Mehreen is calm, comfortable, and laying in her bed. She states that the pain is \"rough\" when she gets in the sling (ulysses lift device), but she is otherwise able to do therapy and her other ADLs with her current regimen. She states her bowels are working well and her appetite is " "\"fair.\" She denies nausea, heartburn. She denies SOB, fever.     History: I have reviewed and updated the patient's Past Medical History, Social History, Family History and Medication List.  ALLERGIES: Penicillins, Aspirin, Atenolol, Atorvastatin, Bupropion, Clindamycin, Codeine, Ibuprofen, Lovastatin, and Cephalexin   Current Outpatient Medications   Medication Sig Dispense Refill     acetaminophen (TYLENOL) 325 MG tablet Take 3 tablets (975 mg) by mouth every 6 hours       bisacodyl (DULCOLAX) 10 MG suppository Place 10 mg rectally daily as needed for constipation       calcium carbonate (OS-YADIRA) 500 MG tablet Take 1 tablet (500 mg) by mouth 2 times daily       calcium carbonate (TUMS) 500 MG chewable tablet Take 1 chew tab by mouth daily as needed (indigestion)       carbidopa-levodopa (SINEMET)  MG tablet Take 1 tablet by mouth 3 times daily 180 tablet 11     clotrimazole (LOTRIMIN) 1 % external cream Apply topically 2 times daily as needed       diclofenac (VOLTAREN) 1 % topical gel Apply 2 g topically 4 times daily for 2 days       DULoxetine (CYMBALTA) 20 MG capsule Take 60 mg by mouth daily       enoxaparin ANTICOAGULANT (LOVENOX) 30 MG/0.3ML syringe Inject 0.3 mLs (30 mg) Subcutaneous every 12 hours for 28 days       gabapentin (NEURONTIN) 300 MG capsule Take 1 capsule (300 mg) by mouth 2 times daily       gabapentin (NEURONTIN) 400 MG capsule Take 2 capsules (800 mg) by mouth At Bedtime       hydrochlorothiazide (HYDRODIURIL) 25 MG tablet TAKE 1 TABLET BY MOUTH EVERY DAY DX HYPERTENSION AND LE EDEMA 31 tablet 11     ibuprofen (ADVIL/MOTRIN) 400 MG tablet Take 1 tablet (400 mg) by mouth every 6 hours as needed for moderate pain       levothyroxine (SYNTHROID/LEVOTHROID) 100 MCG tablet Take 1 tablet (100 mcg) by mouth daily       Lidocaine (LIDOCARE) 4 % Patch Place 2-3 patches onto the skin every 24 hours Apply to right hip and low back for pain  To prevent lidocaine toxicity, patient should be patch " free for 12 hrs daily.       losartan (COZAAR) 50 MG tablet TAKE 1 TABLET BY MOUTH EVERY MORNING 31 tablet 11     melatonin 3 MG tablet Take 1 tablet (3 mg) by mouth every 24 hours       menthol (ICY HOT) 5 % PTCH Apply 1 patch topically every 8 hours as needed for muscle soreness       methocarbamol (ROBAXIN) 500 MG tablet Take 1 tablet (500 mg) by mouth 2 times daily       oxyCODONE (ROXICODONE) 5 MG tablet Take 0.5 tablets (2.5 mg) by mouth every 6 hours as needed for severe pain 16 tablet 0     polyethylene glycol (MIRALAX) 17 GM/Dose powder Take 17 g by mouth daily 510 g      polyethylene glycol-propylene glycol (SYSTANE ULTRA) 0.4-0.3 % SOLN ophthalmic solution Place 2 drops into both eyes 3 times daily (and additionally as needed/requested)       QUEtiapine (SEROQUEL) 25 MG tablet Take 1 tablet (25 mg) by mouth At Bedtime       QUEtiapine (SEROQUEL) 25 MG tablet Take 0.5 tablets (12.5 mg) by mouth 3 times daily as needed (agitation)       senna-docusate (SENOKOT-S/PERICOLACE) 8.6-50 MG tablet Take 1-2 tablets by mouth 2 times daily       vitamin D3 (CHOLECALCIFEROL) 50 mcg (2000 units) tablet Take 1 tablet (50 mcg) by mouth daily       VITAMIN D3 25 MCG (1000 UT) tablet TAKE 2 TABLETS (2000IU) BY MOUTH EVERY DAY DX OSTEOPOROSIS 60 tablet 11     REVIEW OF SYSTEMS: Limited secondary to cognitive impairment but today pt reports the above and 4 point ROS including Respiratory, CV, GI and , other than that noted in the HPI,  is negative.    Objective:  /81   Pulse 66   Temp 97.8  F (36.6  C)   Resp 16   Wt 73.2 kg (161 lb 4.8 oz)   SpO2 93%   BMI 26.03 kg/m    GENERAL APPEARANCE: Alert, in no distress, cooperative.   EYES/ENT: EOM normal on camera, hearing acuity [place].  RESP: Respiratory effort appears unlabored over video screen, no respiratory distress apparent on video, On RA.   CV: Edema appears 0+ BLE via video. Color appears pale.  ABDOMEN: Appears non-distended, rounded  SKIN: Skin appears  baseline w/ video inspection. No wounds/rashes noted.    NEURO: CN II-XII at patient's baseline, MARMOLEJO at baseline on video. Sensation baseline PPS.  PSYCH: Insight, judgement, and memory are baseline impaired, affect and mood are calm/engaged.    Laboratory/Diagnostics: Reviewed in Epic by provider today.     Assessment/Plan:  Multiple closed fractures of pelvis without disruption of pelvic ring with routine healing, subsequent encounter  Closed nondisplaced zone I fracture of sacrum with routine healing, subsequent encounter  Fracture of multiple pubic rami with routine healing, right  Multiple system atrophy P (H)  Lewy body dementia without behavioral disturbance (H)  Psychosis, unspecified psychosis type (H)  Aneurysm of abdominal vessel (H)  Primary osteoarthritis involving multiple joints  Episode of recurrent major depressive disorder, unspecified depression episode severity (H)  Dizziness  VAMSHI (generalized anxiety disorder)  Slow transit constipation  Polypharmacy  Acute-on-chronic. Ongoing.     We note that Mehreen's MSA is likely progressing and affecting her mood, pain, dizziness, and her ability to rehab.     We note in prior discussions with neurology, that it may benefit Mehreen to completely wean from Sinemet to assist with BP control. Family has stated that this was troublesome prior for her ability to mobilize, therefore we will hold off on this change given she is trying to rehab.    We note that she is calm and not c/o hallucinations or having significant paranoia while on Seroquel. We will continue this.    We will increase Oxycodone frequency, as this may help with the rehab process.     We note low total protein and albumin, with a fair appetite/intake. We will start protein supplementation.     Her blood work returned today, and appears to be grossly at baseline or WDL with mild anemia. Her anemia is at baseline, and we note underlying AAA which was repaired in 2015.   Follow-up w/in 1 week or as  needed.    Orders:  1. Increase Oxycodone to 2.5mg PO Q4h PRN. Dx: severe pain.  2. Prostat 30mL PO every day. Dx: malnutrition.     [place] Controlled Substance Medication Fill:  Mehreen Camara  1940  Effective Jan 1, 2021, The [place] Board of Pharmacy has a new legislative requirement that requires checking the [place]  database before initially prescribing an opiate and every three months for patients receiving an opiate for treatment of chronic pain.    not checked due to meeting exception criteria: 9. the prescriber is unable to access the data due to operational or other technological failure of the program so long as the prescriber reports the failure to the board.  CSA completed.  Sent to [facility] for 16-tab amount.     Video-Visit Details  Type of service:  Video Visit  Video Start Time: 1254  Video End Time (time video stopped): 1259  Originating Location (pt. Location): [facility]  Distant Location (provider location):  [facility] TRANSITIONAL CARE   Mode of Communication:  Video Conference.    Electronically signed by:  Dr. Lisa Parry, HAILEE CNP DNP

## 2021-01-11 ENCOUNTER — VIRTUAL VISIT (OUTPATIENT)
Dept: GERIATRICS | Facility: CLINIC | Age: 81
End: 2021-01-11
Payer: COMMERCIAL

## 2021-01-11 VITALS
BODY MASS INDEX: 26.03 KG/M2 | SYSTOLIC BLOOD PRESSURE: 193 MMHG | WEIGHT: 161.3 LBS | HEART RATE: 72 BPM | OXYGEN SATURATION: 97 % | TEMPERATURE: 97.7 F | RESPIRATION RATE: 18 BRPM | DIASTOLIC BLOOD PRESSURE: 94 MMHG

## 2021-01-11 DIAGNOSIS — S32.82XD MULTIPLE CLOSED FRACTURES OF PELVIS WITHOUT DISRUPTION OF PELVIC RING WITH ROUTINE HEALING, SUBSEQUENT ENCOUNTER: Primary | ICD-10-CM

## 2021-01-11 DIAGNOSIS — K59.01 SLOW TRANSIT CONSTIPATION: ICD-10-CM

## 2021-01-11 DIAGNOSIS — S32.591D FRACTURE OF MULTIPLE PUBIC RAMI WITH ROUTINE HEALING, RIGHT: ICD-10-CM

## 2021-01-11 DIAGNOSIS — M15.0 PRIMARY OSTEOARTHRITIS INVOLVING MULTIPLE JOINTS: ICD-10-CM

## 2021-01-11 DIAGNOSIS — F02.80 LEWY BODY DEMENTIA WITHOUT BEHAVIORAL DISTURBANCE (H): ICD-10-CM

## 2021-01-11 DIAGNOSIS — S32.110D CLOSED NONDISPLACED ZONE I FRACTURE OF SACRUM WITH ROUTINE HEALING, SUBSEQUENT ENCOUNTER: ICD-10-CM

## 2021-01-11 DIAGNOSIS — M54.42 CHRONIC BILATERAL LOW BACK PAIN WITH BILATERAL SCIATICA: ICD-10-CM

## 2021-01-11 DIAGNOSIS — G23.2 MULTIPLE SYSTEM ATROPHY P (H): ICD-10-CM

## 2021-01-11 DIAGNOSIS — S32.82XA MULTIPLE CLOSED FRACTURES OF PELVIS WITHOUT DISRUPTION OF PELVIC RING, INITIAL ENCOUNTER (H): ICD-10-CM

## 2021-01-11 DIAGNOSIS — R42 DIZZINESS: ICD-10-CM

## 2021-01-11 DIAGNOSIS — G31.83 LEWY BODY DEMENTIA WITHOUT BEHAVIORAL DISTURBANCE (H): ICD-10-CM

## 2021-01-11 DIAGNOSIS — M54.41 CHRONIC BILATERAL LOW BACK PAIN WITH BILATERAL SCIATICA: ICD-10-CM

## 2021-01-11 DIAGNOSIS — R44.3 HALLUCINATIONS: ICD-10-CM

## 2021-01-11 DIAGNOSIS — G89.29 CHRONIC BILATERAL LOW BACK PAIN WITH BILATERAL SCIATICA: ICD-10-CM

## 2021-01-11 DIAGNOSIS — I10 ESSENTIAL HYPERTENSION: ICD-10-CM

## 2021-01-11 PROCEDURE — 99310 SBSQ NF CARE HIGH MDM 45: CPT | Mod: 95 | Performed by: NURSE PRACTITIONER

## 2021-01-11 NOTE — PROGRESS NOTES
"[facility] Geriatrics Video Visit  Mehreen Camara is a 81 year old female who is being evaluated via a billable video visit due to the restrictions of the COVID19 pandemic.The patient has been notified of following: \"This video visit will be conducted via a call between you and your provider. We have found that certain health care needs can be provided without the need for an in-person physical exam.  This service lets us provide the care you need with a video conversation.  If a prescription is necessary we can send it directly to your pharmacy.  If lab work is needed we can place an order for that and you can then stop by our lab to have the test done at a later time. If during the course of the call the provider feels a video visit is not appropriate, you will not be charged for this service.\"     Proivder has received verbal consent for a Video Visit from the patient? Yes    Patient/facility would like the video invitation sent by: Send to e-mail at: [email or web address]     This visit took place virtually, with the patient located at [facility]   ________________________________________________  Video Start Time: 1319  Mehreen Camara complains of    Chief Complaint   Patient presents with     Video Visit     Nursing Home Acute   HPI/Subjective:  Mehreen seen today on follow-up. We attended IDT rounds and rehabilitation services reported that in their years of working with her, they had never seen her so comfortable (psychologically). She states that in certain positions she is very comfortable, especially when lying. Sitting upright for longer periods seems to be the most painful for her. She also states that pain travels down her legs. This is described as shooting and sudden. She states her bowels have been very good for the last few days. Her appetite is variable. She is frustrated that she isn't back to her normal self.  She denies nausea/heartburn, cough, fever, " SOB, CP. She states she has had some intermittent headaches and dizziness. Nursing reported some significant BPs swings as noted below:        History: I have reviewed and updated the patient's Past Medical History, Social History, Family History and Medication List. ALLERGIES: Penicillins, Aspirin, Atenolol, Atorvastatin, Bupropion, Clindamycin, Codeine, Ibuprofen, Lovastatin, and Cephalexin  MEDICATIONS:  Current Outpatient Medications   Medication Sig Dispense Refill     acetaminophen (TYLENOL) 325 MG tablet Take 3 tablets (975 mg) by mouth every 6 hours       bisacodyl (DULCOLAX) 10 MG suppository Place 10 mg rectally daily as needed for constipation       calcium carbonate (OS-YADIRA) 500 MG tablet Take 1 tablet (500 mg) by mouth 2 times daily       calcium carbonate (TUMS) 500 MG chewable tablet Take 1 chew tab by mouth daily as needed (indigestion)       carbidopa-levodopa (SINEMET)  MG tablet Take 1 tablet by mouth 3 times daily 180 tablet 11     clotrimazole (LOTRIMIN) 1 % external cream Apply topically 2 times daily as needed       diclofenac (VOLTAREN) 1 % topical gel Apply 2 g topically 4 times daily for 2 days       DULoxetine (CYMBALTA) 20 MG capsule Take 60 mg by mouth daily       enoxaparin ANTICOAGULANT (LOVENOX) 30 MG/0.3ML syringe Inject 0.3 mLs (30 mg) Subcutaneous every 12 hours for 28 days       gabapentin (NEURONTIN) 300 MG capsule Take 1 capsule (300 mg) by mouth 2 times daily       gabapentin (NEURONTIN) 400 MG capsule Take 2 capsules (800 mg) by mouth At Bedtime       hydrochlorothiazide (HYDRODIURIL) 25 MG tablet TAKE 1 TABLET BY MOUTH EVERY DAY DX HYPERTENSION AND LE EDEMA 31 tablet 11     ibuprofen (ADVIL/MOTRIN) 400 MG tablet Take 1 tablet (400 mg) by mouth every 6 hours as needed for moderate pain       levothyroxine (SYNTHROID/LEVOTHROID) 100 MCG tablet Take 1 tablet (100 mcg) by mouth daily       Lidocaine (LIDOCARE) 4 % Patch Place 2-3 patches onto the skin every 24 hours Apply  to right hip and low back for pain  To prevent lidocaine toxicity, patient should be patch free for 12 hrs daily.       losartan (COZAAR) 50 MG tablet TAKE 1 TABLET BY MOUTH EVERY MORNING 31 tablet 11     melatonin 3 MG tablet Take 1 tablet (3 mg) by mouth every 24 hours       menthol (ICY HOT) 5 % PTCH Apply 1 patch topically every 8 hours as needed for muscle soreness       methocarbamol (ROBAXIN) 500 MG tablet Take 1 tablet (500 mg) by mouth 2 times daily       oxyCODONE (ROXICODONE) 5 MG tablet Take 0.5 tablets (2.5 mg) by mouth every 4 hours as needed for severe pain 16 tablet 0     polyethylene glycol (MIRALAX) 17 GM/Dose powder Take 17 g by mouth daily 510 g      polyethylene glycol-propylene glycol (SYSTANE ULTRA) 0.4-0.3 % SOLN ophthalmic solution Place 2 drops into both eyes 3 times daily (and additionally as needed/requested)       QUEtiapine (SEROQUEL) 25 MG tablet Take 1 tablet (25 mg) by mouth At Bedtime       QUEtiapine (SEROQUEL) 25 MG tablet Take 0.5 tablets (12.5 mg) by mouth 3 times daily as needed (agitation)       senna-docusate (SENOKOT-S/PERICOLACE) 8.6-50 MG tablet Take 1-2 tablets by mouth 2 times daily       vitamin D3 (CHOLECALCIFEROL) 50 mcg (2000 units) tablet Take 1 tablet (50 mcg) by mouth daily       VITAMIN D3 25 MCG (1000 UT) tablet TAKE 2 TABLETS (2000IU) BY MOUTH EVERY DAY DX OSTEOPOROSIS 60 tablet 11     REVIEW OF SYSTEMS: Limited secondary to cognitive impairment but today pt reports the above and 4 point ROS including Respiratory, CV, GI and , other than that noted in the HPI, is negative.  Objective:  BP (!) 193/94   Pulse 72   Temp 97.7  F (36.5  C)   Resp 18   Wt 73.2 kg (161 lb 4.8 oz)   SpO2 97%   BMI 26.03 kg/m    GENERAL APPEARANCE: Alert, in no distress, cooperative.   EYES/ENT: EOM normal on camera, hearing acuity [place].  RESP: Respiratory effort appears unlabored over video screen, no respiratory distress apparent on video, On RA.   CV: Edema appears 0+ BLE via  video. Color appears pale.  ABDOMEN: Appears non-distended, rounded.  SKIN: Skin appears baseline w/ video inspection. No wounds/rashes noted.    NEURO: CN II-XII at patient's baseline, MARMOLEJO at baseline on video. Sensation baseline PPS.  PSYCH: Insight, judgement, and memory are baseline impaired, affect and mood are happy/engaged.    Laboratory/Diagnostics: Reviewed in Epic by provider today.     Assessment/Plan:  Multiple closed fractures of pelvis without disruption of pelvic ring with routine healing, subsequent encounter  Closed nondisplaced zone I fracture of sacrum with routine healing, subsequent encounter  Fracture of multiple pubic rami with routine healing, right  Chronic bilateral low back pain with bilateral sciatica  Multiple system atrophy P (H)  Lewy body dementia without behavioral disturbance (H)  Hallucinations  Primary osteoarthritis involving multiple joints  Dizziness  Essential hypertension  Slow transit constipation  Acute-on-chronic. Ongoing.    Mehreen's pain is multi-faceted. Certainly, musculoskeletal in nature and positional, but her shooting pain is characteristic of neuropathic pain for which she uses Gabapentin. We note her Oxycodone use is about 2 doses of PRN/day, in addition to scheduled Tylenol. For now, her constipation is well controlled.     We will increase Gabapentin in the AM to combat this pain. This may also assist with BP control.     We counseled Mehreen, reminding her that pelvic fractures take approximately 12 weeks to heal (sometimes more). We educated around multimodal pain management methods during the next 12 weeks. It is likely that she will continue to progress, but this will be a slower recovery than she has experienced in the past. She stated understanding, and surprise. We hope this helps her to know what to expect. This same education given to nursing staff.  We coordinated care with rehabilitation services, , nursing, and in-house psych during  IDT rounds. As noted above, Mehreen is psychologically more stable than many team members have seen her since the start of her Seroquel late last year. In-house psych will periodically follow, and we will renew her PRN Seroquel as this is occasionally helpful for hallucinations/delusions. Noted PRN orders for antipsychotic medications are limited to 14 days. Face to Face encounter done today. Evaluation of Seroquel indicates it is appropriate and necessary to continue this medication for 14 days.  We note that by controlling her pain, anxiety, and hallucinations, we may also obtain better BP control. BP lability, dementia, is also secondary to her MSA as we have confirmed with neurology. Instead of scheduled changes to BP meds, which could create more low's, we will provide nursing a PRN as noted below. BP goals are <150/90 mm Hg.This is higher than ACC and AHA recommendations due to risk for hypotension, risk of dizziness and falls, frailty and Jossie et al (2018) found that sBPs greater than 170 had improved mortality and improved cognitive retention over sBPs under 140 in patients 85 years and older.   Given we are making the below changes, we will continue to evaluate for efficacy. We have spoken with family at-length in the past, regarding Mehreen's likely progression with multisystem atrophy (MSA). We anticipate it may be difficult for her to return to her apartment. As her disease progresses, she will continue to have muscle rigidity, falls, blood pressure lability, and progressive dementia. We will share her progress with her neurologist and with PharmD.   Follow-up w/in 1 week or as needed.    Orders:  1. Increase Gabapentin to 600mg QAM, 300mg Qnoon, and 800mg at bedtime. Dx: pain.  2. RENEW PRN Seroquel x 14 days. Dx: hallucinations/paranoia.   3. Hydralazine 10mg PO TID PRN for SBP>170. Dx: hypertension.     Total time spent with patient visit was 35 min including patient visit and review of past records.  Greater than 50% of total time spent with counseling and coordinating care with rehabilitation services, , nursing PharmD, and neurology as noted above.     Video-Visit Details  Type of service:  Video Visit  Video Start Time: 1319  Video End Time (time video stopped): 1332  Originating Location (pt. Location): [facility]  Distant Location (provider location):  [facility] GERIATRIC TRANSITIONAL CARE   Mode of Communication:  Video Conference.    Electronically signed by:  Dr. Lisa aPrry, APRN CNP DNP

## 2021-01-11 NOTE — LETTER
"    1/11/2021        RE: Mehreen Camara  [facility]  [address]  [facility] [address]        [facility]iatric[facility] Video Visit  Mehreen Camara is a 81 year old female who is being evaluated via a billable video visit due to the restrictions of the COVID19 pandemic.The patient has been notified of following: \"This video visit will be conducted via a call between you and your provider. We have found that certain health care needs can be provided without the need for an in-person physical exam.  This service lets us provide the care you need with a video conversation.  If a prescription is necessary we can send it directly to your pharmacy.  If lab work is needed we can place an order for that and you can then stop by our lab to have the test done at a later time. If during the course of the call the provider feels a video visit is not appropriate, you will not be charged for this service.\"     Proivder has received verbal consent for a Video Visit from the patient? Yes    Patient/facility would like the video invitation sent by: Send to e-mail at: [email or web address]     This visit took place virtually, with the patient located at [facility]   ________________________________________________  Video Start Time: 1319  Mehreen Camara complains of    Chief Complaint   Patient presents with     Video Visit     Nursing Home Acute   HPI/Subjective:  Mehreen seen today on follow-up. We attended IDT rounds and rehabilitation services reported that in their years of working with her, they had never seen her so comfortable (psychologically). She states that in certain positions she is very comfortable, especially when lying. Sitting upright for longer periods seems to be the most painful for her. She also states that pain travels down her legs. This is described as shooting and sudden. She states her bowels have been very good for the last few days. Her " appetite is variable. She is frustrated that she isn't back to her normal self.  She denies nausea/heartburn, cough, fever, SOB, CP. She states she has had some intermittent headaches and dizziness. Nursing reported some significant BPs swings as noted below:        History: I have reviewed and updated the patient's Past Medical History, Social History, Family History and Medication List. ALLERGIES: Penicillins, Aspirin, Atenolol, Atorvastatin, Bupropion, Clindamycin, Codeine, Ibuprofen, Lovastatin, and Cephalexin  MEDICATIONS:  Current Outpatient Medications   Medication Sig Dispense Refill     acetaminophen (TYLENOL) 325 MG tablet Take 3 tablets (975 mg) by mouth every 6 hours       bisacodyl (DULCOLAX) 10 MG suppository Place 10 mg rectally daily as needed for constipation       calcium carbonate (OS-YADIRA) 500 MG tablet Take 1 tablet (500 mg) by mouth 2 times daily       calcium carbonate (TUMS) 500 MG chewable tablet Take 1 chew tab by mouth daily as needed (indigestion)       carbidopa-levodopa (SINEMET)  MG tablet Take 1 tablet by mouth 3 times daily 180 tablet 11     clotrimazole (LOTRIMIN) 1 % external cream Apply topically 2 times daily as needed       diclofenac (VOLTAREN) 1 % topical gel Apply 2 g topically 4 times daily for 2 days       DULoxetine (CYMBALTA) 20 MG capsule Take 60 mg by mouth daily       enoxaparin ANTICOAGULANT (LOVENOX) 30 MG/0.3ML syringe Inject 0.3 mLs (30 mg) Subcutaneous every 12 hours for 28 days       gabapentin (NEURONTIN) 300 MG capsule Take 1 capsule (300 mg) by mouth 2 times daily       gabapentin (NEURONTIN) 400 MG capsule Take 2 capsules (800 mg) by mouth At Bedtime       hydrochlorothiazide (HYDRODIURIL) 25 MG tablet TAKE 1 TABLET BY MOUTH EVERY DAY DX HYPERTENSION AND LE EDEMA 31 tablet 11     ibuprofen (ADVIL/MOTRIN) 400 MG tablet Take 1 tablet (400 mg) by mouth every 6 hours as needed for moderate pain       levothyroxine (SYNTHROID/LEVOTHROID) 100 MCG tablet Take 1  tablet (100 mcg) by mouth daily       Lidocaine (LIDOCARE) 4 % Patch Place 2-3 patches onto the skin every 24 hours Apply to right hip and low back for pain  To prevent lidocaine toxicity, patient should be patch free for 12 hrs daily.       losartan (COZAAR) 50 MG tablet TAKE 1 TABLET BY MOUTH EVERY MORNING 31 tablet 11     melatonin 3 MG tablet Take 1 tablet (3 mg) by mouth every 24 hours       menthol (ICY HOT) 5 % PTCH Apply 1 patch topically every 8 hours as needed for muscle soreness       methocarbamol (ROBAXIN) 500 MG tablet Take 1 tablet (500 mg) by mouth 2 times daily       oxyCODONE (ROXICODONE) 5 MG tablet Take 0.5 tablets (2.5 mg) by mouth every 4 hours as needed for severe pain 16 tablet 0     polyethylene glycol (MIRALAX) 17 GM/Dose powder Take 17 g by mouth daily 510 g      polyethylene glycol-propylene glycol (SYSTANE ULTRA) 0.4-0.3 % SOLN ophthalmic solution Place 2 drops into both eyes 3 times daily (and additionally as needed/requested)       QUEtiapine (SEROQUEL) 25 MG tablet Take 1 tablet (25 mg) by mouth At Bedtime       QUEtiapine (SEROQUEL) 25 MG tablet Take 0.5 tablets (12.5 mg) by mouth 3 times daily as needed (agitation)       senna-docusate (SENOKOT-S/PERICOLACE) 8.6-50 MG tablet Take 1-2 tablets by mouth 2 times daily       vitamin D3 (CHOLECALCIFEROL) 50 mcg (2000 units) tablet Take 1 tablet (50 mcg) by mouth daily       VITAMIN D3 25 MCG (1000 UT) tablet TAKE 2 TABLETS (2000IU) BY MOUTH EVERY DAY DX OSTEOPOROSIS 60 tablet 11     REVIEW OF SYSTEMS: Limited secondary to cognitive impairment but today pt reports the above and 4 point ROS including Respiratory, CV, GI and , other than that noted in the HPI, is negative.  Objective:  BP (!) 193/94   Pulse 72   Temp 97.7  F (36.5  C)   Resp 18   Wt 73.2 kg (161 lb 4.8 oz)   SpO2 97%   BMI 26.03 kg/m    GENERAL APPEARANCE: Alert, in no distress, cooperative.   EYES/ENT: EOM normal on camera, hearing acuity [place].  RESP: Respiratory  effort appears unlabored over video screen, no respiratory distress apparent on video, On RA.   CV: Edema appears 0+ BLE via video. Color appears pale.  ABDOMEN: Appears non-distended, rounded.  SKIN: Skin appears baseline w/ video inspection. No wounds/rashes noted.    NEURO: CN II-XII at patient's baseline, MARMOLEJO at baseline on video. Sensation baseline PPS.  PSYCH: Insight, judgement, and memory are baseline impaired, affect and mood are happy/engaged.    Laboratory/Diagnostics: Reviewed in Epic by provider today.     Assessment/Plan:  Multiple closed fractures of pelvis without disruption of pelvic ring with routine healing, subsequent encounter  Closed nondisplaced zone I fracture of sacrum with routine healing, subsequent encounter  Fracture of multiple pubic rami with routine healing, right  Chronic bilateral low back pain with bilateral sciatica  Multiple system atrophy P (H)  Lewy body dementia without behavioral disturbance (H)  Hallucinations  Primary osteoarthritis involving multiple joints  Dizziness  Essential hypertension  Slow transit constipation  Acute-on-chronic. Ongoing.    Mehreen's pain is multi-faceted. Certainly, musculoskeletal in nature and positional, but her shooting pain is characteristic of neuropathic pain for which she uses Gabapentin. We note her Oxycodone use is about 2 doses of PRN/day, in addition to scheduled Tylenol. For now, her constipation is well controlled.     We will increase Gabapentin in the AM to combat this pain. This may also assist with BP control.     We counseled Mehreen, reminding her that pelvic fractures take approximately 12 weeks to heal (sometimes more). We educated around multimodal pain management methods during the next 12 weeks. It is likely that she will continue to progress, but this will be a slower recovery than she has experienced in the past. She stated understanding, and surprise. We hope this helps her to know what to expect. This same education  given to nursing staff.  We coordinated care with rehabilitation services, , nursing, and in-house psych during IDT rounds. As noted above, Mehreen is psychologically more stable than many team members have seen her since the start of her Seroquel late last year. In-house psych will periodically follow, and we will renew her PRN Seroquel as this is occasionally helpful for hallucinations/delusions. Noted PRN orders for antipsychotic medications are limited to 14 days. Face to Face encounter done today. Evaluation of Seroquel indicates it is appropriate and necessary to continue this medication for 14 days.  We note that by controlling her pain, anxiety, and hallucinations, we may also obtain better BP control. BP lability, dementia, is also secondary to her MSA as we have confirmed with neurology. Instead of scheduled changes to BP meds, which could create more low's, we will provide nursing a PRN as noted below. BP goals are <150/90 mm Hg.This is higher than ACC and AHA recommendations due to risk for hypotension, risk of dizziness and falls, frailty and Jossie et al (2018) found that sBPs greater than 170 had improved mortality and improved cognitive retention over sBPs under 140 in patients 85 years and older.   Given we are making the below changes, we will continue to evaluate for efficacy. We have spoken with family at-length in the past, regarding Mehreen's likely progression with multisystem atrophy (MSA). We anticipate it may be difficult for her to return to her apartment. As her disease progresses, she will continue to have muscle rigidity, falls, blood pressure lability, and progressive dementia. We will share her progress with her neurologist and with PharmD.   Follow-up w/in 1 week or as needed.    Orders:  1. Increase Gabapentin to 600mg QAM, 300mg Qnoon, and 800mg at bedtime. Dx: pain.  2. RENEW PRN Seroquel x 14 days. Dx: hallucinations/paranoia.   3. Hydralazine 10mg PO TID PRN for  SBP>170. Dx: hypertension.     Total time spent with patient visit was 35 min including patient visit and review of past records. Greater than 50% of total time spent with counseling and coordinating care with rehabilitation services, , nursing PharmD, and neurology as noted above.     Video-Visit Details  Type of service:  Video Visit  Video Start Time: 1319  Video End Time (time video stopped): 1332  Originating Location (pt. Location): [facility]  Distant Location (provider location):  [facility] TRANSITIONAL CARE   Mode of Communication:  Video Conference.    Electronically signed by:  HAILEE Gilbert CNP DNP            Sincerely,        HAILEE Langley CNP

## 2021-01-12 RX ORDER — GABAPENTIN 300 MG/1
CAPSULE ORAL
Start: 2021-01-12 | End: 2021-02-12

## 2021-01-12 RX ORDER — HYDRALAZINE HYDROCHLORIDE 10 MG/1
10 TABLET, FILM COATED ORAL 3 TIMES DAILY PRN
COMMUNITY
End: 2022-02-27

## 2021-01-12 RX ORDER — ACETAMINOPHEN 500 MG
1000 TABLET ORAL 3 TIMES DAILY
Start: 2021-01-12

## 2021-01-13 DIAGNOSIS — S32.82XD MULTIPLE CLOSED FRACTURES OF PELVIS WITHOUT DISRUPTION OF PELVIC RING WITH ROUTINE HEALING, SUBSEQUENT ENCOUNTER: ICD-10-CM

## 2021-01-13 RX ORDER — OXYCODONE HYDROCHLORIDE 5 MG/1
TABLET ORAL
Qty: 30 TABLET | Refills: 0 | Status: SHIPPED | OUTPATIENT
Start: 2021-01-13 | End: 2021-01-18

## 2021-01-15 ENCOUNTER — TELEPHONE (OUTPATIENT)
Dept: GERIATRICS | Facility: CLINIC | Age: 81
End: 2021-01-15
Payer: COMMERCIAL

## 2021-01-15 DIAGNOSIS — Z53.9 ERRONEOUS ENCOUNTER--DISREGARD: Primary | ICD-10-CM

## 2021-01-18 ENCOUNTER — VIRTUAL VISIT (OUTPATIENT)
Dept: GERIATRICS | Facility: CLINIC | Age: 81
End: 2021-01-18
Payer: COMMERCIAL

## 2021-01-18 VITALS
HEIGHT: 66 IN | TEMPERATURE: 97.9 F | RESPIRATION RATE: 16 BRPM | SYSTOLIC BLOOD PRESSURE: 177 MMHG | BODY MASS INDEX: 25.92 KG/M2 | DIASTOLIC BLOOD PRESSURE: 99 MMHG | OXYGEN SATURATION: 98 % | HEART RATE: 70 BPM | WEIGHT: 161.3 LBS

## 2021-01-18 DIAGNOSIS — G47.52 REM SLEEP BEHAVIOR DISORDER: ICD-10-CM

## 2021-01-18 DIAGNOSIS — S32.82XD MULTIPLE CLOSED FRACTURES OF PELVIS WITHOUT DISRUPTION OF PELVIC RING WITH ROUTINE HEALING, SUBSEQUENT ENCOUNTER: ICD-10-CM

## 2021-01-18 DIAGNOSIS — R41.89 COGNITIVE IMPAIRMENT: ICD-10-CM

## 2021-01-18 DIAGNOSIS — M54.42 CHRONIC BILATERAL LOW BACK PAIN WITH BILATERAL SCIATICA: ICD-10-CM

## 2021-01-18 DIAGNOSIS — G89.29 CHRONIC BILATERAL LOW BACK PAIN WITH BILATERAL SCIATICA: ICD-10-CM

## 2021-01-18 DIAGNOSIS — M54.41 CHRONIC BILATERAL LOW BACK PAIN WITH BILATERAL SCIATICA: ICD-10-CM

## 2021-01-18 DIAGNOSIS — F22 PARANOIA (H): ICD-10-CM

## 2021-01-18 DIAGNOSIS — G23.2 MULTIPLE SYSTEM ATROPHY P (H): ICD-10-CM

## 2021-01-18 DIAGNOSIS — S32.591D FRACTURE OF MULTIPLE PUBIC RAMI WITH ROUTINE HEALING, RIGHT: Primary | ICD-10-CM

## 2021-01-18 PROCEDURE — 99306 1ST NF CARE HIGH MDM 50: CPT | Mod: 95 | Performed by: FAMILY MEDICINE

## 2021-01-18 RX ORDER — OXYCODONE HYDROCHLORIDE 5 MG/1
5 TABLET ORAL EVERY 4 HOURS PRN
Qty: 30 TABLET | Refills: 0
Start: 2021-01-18 | End: 2021-01-20

## 2021-01-18 ASSESSMENT — MIFFLIN-ST. JEOR: SCORE: 1213.4

## 2021-01-18 NOTE — LETTER
"    1/18/2021        RE: Mehreen Camara  [facility]  [address]  [facility] [address]         [place] [facility]  Mehreen Camara is being evaluated via a billable video.   The patient has been notified of following:  \"This video visit will be conducted via a call between you and your provider. We have found that certain health care needs can be provided without the need for an in-person physical exam.  This service lets us provide the care you need with a video conversation. If during the course of the call the provider feels a video visit is not appropriate, you will not be charged for this service.\"   The provider has received verbal consent for a Video Visit from the patient and or first contact? Yes  Patient/facility staff would like the video invitation sent by: N/A   Video Start Time: 14:12  Which Facility the Patient is at during the time of visit: [facility]  PRIMARY CARE PROVIDER AND CLINIC:  Lisa Parry, HAILEE CNP, [address] [facility] [address] / [facility] [address]  Chief Complaint   Patient presents with     Hospital F/U     Video Visit     [facility] Medical Record Number:  [id number]  Mehreen Camara  is a 81 year old  (1940), admitted to the above facility from  [facility]. Hospital stay 12/23/21 through 12/30/21..  Admitted to this facility for  rehab, medical management and nursing care.  HPI:    HPI information obtained from: facility staff, patient report and [facility] chart review.   Brief Summary of Hospital Course:   - Pt with PMH notable for multiple system atrophy, and  chronic pain syndrome, had a fall resulted in sacral ala and right pubic ramus fx, and small pelvic hematoma, managed conservatively. Hector    Today:  - Pt seen in the presence of RN who graciously assisted with the virtual visit  - Pain: pt reports pain is always high over 7, rungs 7-8 all the times, right now my pain is new in the pelvic area. Very " "intense, like  A punch of needs, and stabbing legs in the my legs past my knee on the front and on the back, on  Both sided, but more onteh right side. Aggravated with anytime I move, I never be comfortable, I cannot sit now for more than 5 minutes before it becomes tense, and I have to change my position\". Reports it is very painful to move and walk with rehab. \" before this happened my pain was getting better, but now after the recent falls it is worse' reports pain gets better with pain killer, my pain better when I get a whole a tab .  Denies any lack of focus or confusion when was taking the full tab.       CODE STATUS/ADVANCE DIRECTIVES DISCUSSION:   DNR / DNI  Patient's living condition: lives in an assisted living facility  ALLERGIES: Penicillins, Aspirin, Atenolol, Atorvastatin, Bupropion, Clindamycin, Codeine, Ibuprofen, Lovastatin, and Cephalexin  PAST MEDICAL HISTORY:  has a past medical history of Adrenal adenoma, Arthritis, Depressive disorder, Hypertension, Multiple system atrophy P (H), Rheumatic fever, Small bowel obstruction (H), and Thyroid disease.  PAST SURGICAL HISTORY:   has a past surgical history that includes orthopedic surgery; Thyroidectomy (2011); joint replacement (Right, 2003); Hysterectomy (2013); carpal tunnel release rt/lt (Bilateral, 2013); Spine surgery (1981); aaa repair (2015); and Laparoscopic cholecystectomy (N/A, 12/12/2018).  FAMILY HISTORY: family history includes Coronary Artery Disease in her father and mother; Hypertension in her mother; Other Cancer in her brother; Parkinsonism in an other family member.  SOCIAL HISTORY:   reports that she quit smoking about 26 years ago. Her smoking use included cigarettes. She smoked 0.50 packs per day. She has never used smokeless tobacco. She reports that she does not drink alcohol or use drugs.  Current Outpatient Medications   Medication Sig Dispense Refill     acetaminophen (TYLENOL) 500 MG tablet Take 2 tablets (1,000 mg) by " mouth 3 times daily       bisacodyl (DULCOLAX) 10 MG suppository Place 10 mg rectally daily as needed for constipation       calcium carbonate (OS-YADIRA) 500 MG tablet Take 1 tablet (500 mg) by mouth 2 times daily       calcium carbonate (TUMS) 500 MG chewable tablet Take 1 chew tab by mouth daily as needed (indigestion)       carbidopa-levodopa (SINEMET)  MG tablet Take 1 tablet by mouth 3 times daily 180 tablet 11     clotrimazole (LOTRIMIN) 1 % external cream Apply topically 2 times daily as needed       diclofenac (VOLTAREN) 1 % topical gel Apply 2 g topically 4 times daily for 2 days       DULoxetine (CYMBALTA) 20 MG capsule Take 60 mg by mouth daily       enoxaparin ANTICOAGULANT (LOVENOX) 30 MG/0.3ML syringe Inject 0.3 mLs (30 mg) Subcutaneous every 12 hours for 28 days       gabapentin (NEURONTIN) 300 MG capsule Take 1 capsule (300 mg) by mouth daily (with lunch) AND 2 capsules (600 mg) every morning.       gabapentin (NEURONTIN) 400 MG capsule Take 2 capsules (800 mg) by mouth At Bedtime       hydrALAZINE (APRESOLINE) 10 MG tablet Take 10 mg by mouth 3 times daily as needed for high blood pressure For SBP >170       hydrochlorothiazide (HYDRODIURIL) 25 MG tablet TAKE 1 TABLET BY MOUTH EVERY DAY DX HYPERTENSION AND LE EDEMA 31 tablet 11     ibuprofen (ADVIL/MOTRIN) 400 MG tablet Take 1 tablet (400 mg) by mouth every 6 hours as needed for moderate pain       levothyroxine (SYNTHROID/LEVOTHROID) 100 MCG tablet Take 1 tablet (100 mcg) by mouth daily       Lidocaine (LIDOCARE) 4 % Patch Place 2-3 patches onto the skin every 24 hours Apply to right hip and low back for pain  To prevent lidocaine toxicity, patient should be patch free for 12 hrs daily.       losartan (COZAAR) 50 MG tablet TAKE 1 TABLET BY MOUTH EVERY MORNING 31 tablet 11     melatonin 3 MG tablet Take 1 tablet (3 mg) by mouth every 24 hours       menthol (ICY HOT) 5 % PTCH Apply 1 patch topically every 8 hours as needed for muscle soreness    "    methocarbamol (ROBAXIN) 500 MG tablet Take 1 tablet (500 mg) by mouth 2 times daily       oxyCODONE (ROXICODONE) 5 MG tablet TAKE 1/2 TAB (2.5 MG) BY MOUTH EVERY 4 HOURS AS NEEDED FOR SEVERE PAIN 30 tablet 0     polyethylene glycol (MIRALAX) 17 GM/Dose powder Take 17 g by mouth daily 510 g      polyethylene glycol-propylene glycol (SYSTANE ULTRA) 0.4-0.3 % SOLN ophthalmic solution Place 2 drops into both eyes 3 times daily (and additionally as needed/requested)       QUEtiapine (SEROQUEL) 25 MG tablet Take 1 tablet (25 mg) by mouth At Bedtime       QUEtiapine (SEROQUEL) 25 MG tablet Take 0.5 tablets (12.5 mg) by mouth 3 times daily as needed (agitation)       senna-docusate (SENOKOT-S/PERICOLACE) 8.6-50 MG tablet Take 1-2 tablets by mouth 2 times daily       vitamin D3 (CHOLECALCIFEROL) 50 mcg (2000 units) tablet Take 1 tablet (50 mcg) by mouth daily       VITAMIN D3 25 MCG (1000 UT) tablet TAKE 2 TABLETS (2000IU) BY MOUTH EVERY DAY DX OSTEOPOROSIS 60 tablet 11      discharge medications reconciled and changed, per note/orders    ROS: 10 point ROS of systems including Constitutional, Eyes, Respiratory, Cardiovascular, Gastroenterology, Genitourinary, Integumentary, Musculoskeletal, Psychiatric were all negative except for pertinent positives noted in my HPI.  Vitals:BP (!) 177/99   Pulse 70   Temp 97.9  F (36.6  C)   Resp 16   Ht 1.676 m (5' 6\")   Wt 73.2 kg (161 lb 4.8 oz)   SpO2 98%   BMI 26.03 kg/m     Limited Visit Exam done given COVID-19 precautions:  GENERAL APPEARANCE:  in no distress  RESP:  unlabored breathing  M/S:   no joint deformity noted  SKIN:  no rash noted  NEURO:   no purposeful movement in upper and lower extremities  PSYCH:  affect and mood normal    Lab/Diagnostic data: Reviewed in the chart and EHR.        ASSESSMENT/PLAN:  ---------------------------------  Closed nondisplaced zone I fracture of sacrum with routine healing, subsequent encounter  Fracture of multiple pubic rami with " routine healing, right  L4 compression acute fx (Oct 2020)  Scoliosis  Primary OA involving multiple joints.   Chronic bilateral low back pain with bilateral sciatica  Frequent falls:  Moderated spinal canal stenosis  Hx of Achillis Tendon Rupture  Chronic Pain syndrome  - - Started rehab program, making a  Slow progress.   - Followed by Orthopedic Team.  - Analgesia suboptimal affecting patient QOL, and extent of participation with rehab program. currently on oxycodone 2.5 mg q 4 hr. We feel she will be benefit from increasing the dose to 5 mg q 4 hr. Discussed with nurse manager to monitor for confusion, and pain level.    - [facility] Controlled Substance Medication Fill:  Mehreen C Jax  1940  Effective Jan 1, 2021, The [place] Board of Pharmacy has a new legislative requirement that requires checking the [place]  database before initially prescribing an opiate and every three months for patients receiving an opiate for treatment of chronic pain.   Request for controlled substance medication:    There are no diagnoses linked to this encounter.   checked, and noted last fill of medication recently, however due to patient now being at a SNF or AL, last fill can not accessed by facility staff or patient and medication needs to be filled with facility contracted pharmacy.          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   - query part of it 2/2 opioid.   - has neuropsychiatric eval (Oct 2020), and was noted to have pronounced deficit in executive function, attention, learning, and recall memory.   - on sinemet. Followed by neurology      Essential HTN:   Hx of severe Autonomic Dysfunction  BP Readings from Last 3 Encounters:   01/18/21 (!) 177/99   01/11/21 (!)  193/94   01/04/21 138/81   - Labile.  This could be due to setting of checking BP. For instance BP would be elevated when in supine position but lower in a siting or standing position. Also, readings will varies based on timing of checking BP, prior to meds or after, also, the emotional (anxiety, restlessness) and physical (pain) status of the patient.   - on HCTZ 25 mg,  Losartan 50 mg, and now hydralazine 10 mg tid prn SBP > 170      Hypoalbuminemia  Body mass index is 26.03 kg/m .  Wt Readings from Last 5 Encounters:   01/18/21 73.2 kg (161 lb 4.8 oz)   01/11/21 73.2 kg (161 lb 4.8 oz)   01/04/21 73.2 kg (161 lb 4.8 oz)   12/28/20 74.4 kg (164 lb 0.4 oz)   12/30/20 74.4 kg (164 lb)   - diminished po intake, started on supplement      AAA (H): no concern.     Harris's esophagus: off pantoprazole (? In October)    ORDER: See above, otherwise, continue the rest of the current POC.     Total time with patient visit: {55 minutes  Including reviewing extensive record and complexity, discussion with nurse manager, Greater than 50% of total time spent with counseling and coordinating care due to above complex condition.       Electronically signed by:  Ignacia Reyes MD     Video-Visit Details  Type of service:  Video Visit  Video End Time (time video stopped): 14:23  Distant Location (provider location):  [place] [facility]                   Sincerely,        Ignacia Reyes MD

## 2021-01-18 NOTE — PROGRESS NOTES
" [facility]  Mehreen Camara is being evaluated via a billable video.   The patient has been notified of following:  \"This video visit will be conducted via a call between you and your provider. We have found that certain health care needs can be provided without the need for an in-person physical exam.  This service lets us provide the care you need with a video conversation. If during the course of the call the provider feels a video visit is not appropriate, you will not be charged for this service.\"   The provider has received verbal consent for a Video Visit from the patient and or first contact? Yes  Patient/facility staff would like the video invitation sent by: N/A   Video Start Time: 14:12  Which Facility the Patient is at during the time of visit: [facility]  PRIMARY CARE PROVIDER AND CLINIC:  HAILEE Langley CNP, [address] [facility] [address] / [place] [address]  Chief Complaint   Patient presents with     Hospital F/U     Video Visit     [facility] Record Number:  [id number]  Mehreen Camara  is a 81 year old  (1940), admitted to the above facility from  [facility]. Hospital stay 12/23/21 through 12/30/21..  Admitted to this facility for  rehab, medical management and nursing care.  HPI:    HPI information obtained from: facility staff, patient report and [facility] chart review.   Brief Summary of Hospital Course:   - Pt with PMH notable for multiple system atrophy, and  chronic pain syndrome, had a fall resulted in sacral ala and right pubic ramus fx, and small pelvic hematoma, managed conservatively. Hector    Today:  - Pt seen in the presence of RN who graciously assisted with the virtual visit  - Pain: pt reports pain is always high over 7, rungs 7-8 all the times, right now my pain is new in the pelvic area. Very intense, like  A punch of needs, and stabbing legs in the my legs past my knee on the front and on the back, on  Both sided, " "but more onteh right side. Aggravated with anytime I move, I never be comfortable, I cannot sit now for more than 5 minutes before it becomes tense, and I have to change my position\". Reports it is very painful to move and walk with rehab. \" before this happened my pain was getting better, but now after the recent falls it is worse' reports pain gets better with pain killer, my pain better when I get a whole a tab .  Denies any lack of focus or confusion when was taking the full tab.       CODE STATUS/ADVANCE DIRECTIVES DISCUSSION:   DNR / DNI  Patient's living condition: lives in an assisted living facility  ALLERGIES: Penicillins, Aspirin, Atenolol, Atorvastatin, Bupropion, Clindamycin, Codeine, Ibuprofen, Lovastatin, and Cephalexin  PAST MEDICAL HISTORY:  has a past medical history of Adrenal adenoma, Arthritis, Depressive disorder, Hypertension, Multiple system atrophy P (H), Rheumatic fever, Small bowel obstruction (H), and Thyroid disease.  PAST SURGICAL HISTORY:   has a past surgical history that includes orthopedic surgery; Thyroidectomy (2011); joint replacement (Right, 2003); Hysterectomy (2013); carpal tunnel release rt/lt (Bilateral, 2013); Spine surgery (1981); aaa repair (2015); and Laparoscopic cholecystectomy (N/A, 12/12/2018).  FAMILY HISTORY: family history includes Coronary Artery Disease in her father and mother; Hypertension in her mother; Other Cancer in her brother; Parkinsonism in an other family member.  SOCIAL HISTORY:   reports that she quit smoking about 26 years ago. Her smoking use included cigarettes. She smoked 0.50 packs per day. She has never used smokeless tobacco. She reports that she does not drink alcohol or use drugs.  Current Outpatient Medications   Medication Sig Dispense Refill     acetaminophen (TYLENOL) 500 MG tablet Take 2 tablets (1,000 mg) by mouth 3 times daily       bisacodyl (DULCOLAX) 10 MG suppository Place 10 mg rectally daily as needed for constipation       " calcium carbonate (OS-YADIRA) 500 MG tablet Take 1 tablet (500 mg) by mouth 2 times daily       calcium carbonate (TUMS) 500 MG chewable tablet Take 1 chew tab by mouth daily as needed (indigestion)       carbidopa-levodopa (SINEMET)  MG tablet Take 1 tablet by mouth 3 times daily 180 tablet 11     clotrimazole (LOTRIMIN) 1 % external cream Apply topically 2 times daily as needed       diclofenac (VOLTAREN) 1 % topical gel Apply 2 g topically 4 times daily for 2 days       DULoxetine (CYMBALTA) 20 MG capsule Take 60 mg by mouth daily       enoxaparin ANTICOAGULANT (LOVENOX) 30 MG/0.3ML syringe Inject 0.3 mLs (30 mg) Subcutaneous every 12 hours for 28 days       gabapentin (NEURONTIN) 300 MG capsule Take 1 capsule (300 mg) by mouth daily (with lunch) AND 2 capsules (600 mg) every morning.       gabapentin (NEURONTIN) 400 MG capsule Take 2 capsules (800 mg) by mouth At Bedtime       hydrALAZINE (APRESOLINE) 10 MG tablet Take 10 mg by mouth 3 times daily as needed for high blood pressure For SBP >170       hydrochlorothiazide (HYDRODIURIL) 25 MG tablet TAKE 1 TABLET BY MOUTH EVERY DAY DX HYPERTENSION AND LE EDEMA 31 tablet 11     ibuprofen (ADVIL/MOTRIN) 400 MG tablet Take 1 tablet (400 mg) by mouth every 6 hours as needed for moderate pain       levothyroxine (SYNTHROID/LEVOTHROID) 100 MCG tablet Take 1 tablet (100 mcg) by mouth daily       Lidocaine (LIDOCARE) 4 % Patch Place 2-3 patches onto the skin every 24 hours Apply to right hip and low back for pain  To prevent lidocaine toxicity, patient should be patch free for 12 hrs daily.       losartan (COZAAR) 50 MG tablet TAKE 1 TABLET BY MOUTH EVERY MORNING 31 tablet 11     melatonin 3 MG tablet Take 1 tablet (3 mg) by mouth every 24 hours       menthol (ICY HOT) 5 % PTCH Apply 1 patch topically every 8 hours as needed for muscle soreness       methocarbamol (ROBAXIN) 500 MG tablet Take 1 tablet (500 mg) by mouth 2 times daily       oxyCODONE (ROXICODONE) 5 MG  "tablet TAKE 1/2 TAB (2.5 MG) BY MOUTH EVERY 4 HOURS AS NEEDED FOR SEVERE PAIN 30 tablet 0     polyethylene glycol (MIRALAX) 17 GM/Dose powder Take 17 g by mouth daily 510 g      polyethylene glycol-propylene glycol (SYSTANE ULTRA) 0.4-0.3 % SOLN ophthalmic solution Place 2 drops into both eyes 3 times daily (and additionally as needed/requested)       QUEtiapine (SEROQUEL) 25 MG tablet Take 1 tablet (25 mg) by mouth At Bedtime       QUEtiapine (SEROQUEL) 25 MG tablet Take 0.5 tablets (12.5 mg) by mouth 3 times daily as needed (agitation)       senna-docusate (SENOKOT-S/PERICOLACE) 8.6-50 MG tablet Take 1-2 tablets by mouth 2 times daily       vitamin D3 (CHOLECALCIFEROL) 50 mcg (2000 units) tablet Take 1 tablet (50 mcg) by mouth daily       VITAMIN D3 25 MCG (1000 UT) tablet TAKE 2 TABLETS (2000IU) BY MOUTH EVERY DAY DX OSTEOPOROSIS 60 tablet 11      discharge medications reconciled and changed, per note/orders    ROS: 10 point ROS of systems including Constitutional, Eyes, Respiratory, Cardiovascular, Gastroenterology, Genitourinary, Integumentary, Musculoskeletal, Psychiatric were all negative except for pertinent positives noted in my HPI.  Vitals:BP (!) 177/99   Pulse 70   Temp 97.9  F (36.6  C)   Resp 16   Ht 1.676 m (5' 6\")   Wt 73.2 kg (161 lb 4.8 oz)   SpO2 98%   BMI 26.03 kg/m     Limited Visit Exam done given COVID-19 precautions:  GENERAL APPEARANCE:  in no distress  RESP:  unlabored breathing  M/S:   no joint deformity noted  SKIN:  no rash noted  NEURO:   no purposeful movement in upper and lower extremities  PSYCH:  affect and mood normal    Lab/Diagnostic data: Reviewed in the chart and EHR.        ASSESSMENT/PLAN:  ---------------------------------  Closed nondisplaced zone I fracture of sacrum with routine healing, subsequent encounter  Fracture of multiple pubic rami with routine healing, right  L4 compression acute fx (Oct 2020)  Scoliosis  Primary OA involving multiple joints.   Chronic " bilateral low back pain with bilateral sciatica  Frequent falls:  Moderated spinal canal stenosis  Hx of Achillis Tendon Rupture  Chronic Pain syndrome  - - Started rehab program, making a  Slow progress.   - Followed by Orthopedic Team.  - Analgesia suboptimal affecting patient QOL, and extent of participation with rehab program. currently on oxycodone 2.5 mg q 4 hr. We feel she will be benefit from increasing the dose to 5 mg q 4 hr. Discussed with nurse manager to monitor for confusion, and pain level.    - [facility] Controlled Substance Medication Fill:  Mehreen C Jax  1940  Effective Jan 1, 2021, The [place] Board of Pharmacy has a new legislative requirement that requires checking the [place]  database before initially prescribing an opiate and every three months for patients receiving an opiate for treatment of chronic pain.   Request for controlled substance medication:    There are no diagnoses linked to this encounter.   checked, and noted last fill of medication recently, however due to patient now being at a SNF or AL, last fill can not accessed by facility staff or patient and medication needs to be filled with facility contracted pharmacy.          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   - query part of it 2/2 opioid.   - has neuropsychiatric eval (Oct 2020), and was noted to have pronounced deficit in executive function, attention, learning, and recall memory.   - on sinemet. Followed by neurology      Essential HTN:   Hx of severe Autonomic Dysfunction  BP Readings from Last 3 Encounters:   01/18/21 (!) 177/99   01/11/21 (!) 193/94   01/04/21 138/81   - Labile.  This could be due to setting of checking BP. For instance BP would be elevated when in  supine position but lower in a siting or standing position. Also, readings will varies based on timing of checking BP, prior to meds or after, also, the emotional (anxiety, restlessness) and physical (pain) status of the patient.   - on HCTZ 25 mg,  Losartan 50 mg, and now hydralazine 10 mg tid prn SBP > 170      Hypoalbuminemia  Body mass index is 26.03 kg/m .  Wt Readings from Last 5 Encounters:   01/18/21 73.2 kg (161 lb 4.8 oz)   01/11/21 73.2 kg (161 lb 4.8 oz)   01/04/21 73.2 kg (161 lb 4.8 oz)   12/28/20 74.4 kg (164 lb 0.4 oz)   12/30/20 74.4 kg (164 lb)   - diminished po intake, started on supplement      AAA (H): no concern.     Harris's esophagus: off pantoprazole (? In October)    ORDER: See above, otherwise, continue the rest of the current POC.     Total time with patient visit: {55 minutes  Including reviewing extensive record and complexity, discussion with nurse manager, Greater than 50% of total time spent with counseling and coordinating care due to above complex condition.       Electronically signed by:  Ignacia Reyes MD     Video-Visit Details  Type of service:  Video Visit  Video End Time (time video stopped): 14:23  Distant Location (provider location):  [place] [facility]

## 2021-01-19 ENCOUNTER — VIRTUAL VISIT (OUTPATIENT)
Dept: NEUROLOGY | Facility: CLINIC | Age: 81
End: 2021-01-19
Payer: COMMERCIAL

## 2021-01-19 DIAGNOSIS — G23.2 MULTIPLE SYSTEM ATROPHY P (H): Primary | ICD-10-CM

## 2021-01-19 PROCEDURE — 99214 OFFICE O/P EST MOD 30 MIN: CPT | Mod: 95 | Performed by: PSYCHIATRY & NEUROLOGY

## 2021-01-19 NOTE — LETTER
1/19/2021       RE: Mehreen Camara  [facility]  [address]  [facility] [address]     Dear Colleague,    Thank you for referring your patient, Mehreen Camara, to the [facility] [place] at [facility]. Please see a copy of my visit note below.    Mehreen is a 81 year old who is being evaluated via a billable video visit.      How would you like to obtain your AVS? mychart  If the video visit is dropped, the invitation should be resent by:   [email or web address] and  [email or web address]  Will anyone else be joining your video visit? yes    Video Start Time: 3:33  Video-Visit Details    I changed the patient's scheduled in-person visit to a virtual video visit  because of the COVID19 crisis.  The rationale was to protect the patient from unnecessary interpersonal contact.    Chief complaint: Multiple system atrophy    History of present illness:  Mrs. Mehreen Hale is a patient with an established diagnosis of multiple system atrophy.  She has unfortunately had progressive decline.  I had a video visit with the patient and her caregiver and her transitional care unit and her daughter.    Her daughter Eladio says that the patient remains determined to fight her disease.  She experienced pelvic fractures on December 23, 2020.  She is in severe pain.  She continuously asked for increase in her OxyContin.  This is being adjusted by her primary care physicians and the dose was just increased yesterday.    She had had episodes of hallucinosis and there was some concern that her MSA was causing cognitive problems.  I think this is likely the case.  There are patients who have features of both MSA and Lewy body disease.  Thankfully the hallucinations have been improved with quetiapine.  She is taking quetiapine 25 mg at bedtime.  She has 12.5 mg of quetiapine prescribed on a as needed basis 3 times a day.  Her  nurse says she rarely needs it.  She is rarely agitated.    We had reduced her carbidopa levodopa to 25/100, 1 tablet 3 times a day.  She has had some shaking but it is not severe.  She is really not walking or ambulatory.  There is really no clear worsening in her functional status by coming down on the carbidopa levodopa.    Likewise she had problems with orthostatic hypotension in the past but that is not a problem now.  She is in bed all day.  Because of that she is having some supine hypertension which is being treated with hydralazine.    Her RBD is not so bad now.  She experiences pins-and-needles in her feet which is likely mild peripheral neuropathy which can can happen in MSA.    Examination:  The patient is lying in bed and is alert and quite conversant.  Currently there is no signs of delusion or hallucination.    Her AMRs are slowed in her hands but really nothing else can be seen concerning her parkinsonism.    Impression:  1.  Multiple system atrophy  2.  Cognitive decline likely secondary to her MSA reflecting overlap Lewy body dementia  3.  Pelvic fractures    Recommendations:  1.  I think the patient's primary care team is doing a wonderful job of keeping her stable.  Currently the issues surrounding her MSA are not of concern.  2.  Continue carbidopa/levodopa, 25/100, 1 tablet 3 times a day.    Chris Monterroso MD    30 minutes spent in the review of records review of imaging preparation of report and seeing patient in person.    Type of service:  Video Visit    Video End Time:3:53    Originating Location (pt. Location): Home    Distant Location (provider location):  [facility]     Platform used for Video Visit: vince      Again, thank you for allowing me to participate in the care of your patient.      Sincerely,    Chris Monterroso MD

## 2021-01-19 NOTE — PROGRESS NOTES
Mehreen is a 81 year old who is being evaluated via a billable video visit.      How would you like to obtain your AVS? mychart  If the video visit is dropped, the invitation should be resent by:   [email or web address] and  [email or web address]  Will anyone else be joining your video visit? yes    Video Start Time: 3:33  Video-Visit Details    I changed the patient's scheduled in-person visit to a virtual video visit  because of the COVID19 crisis.  The rationale was to protect the patient from unnecessary interpersonal contact.    Chief complaint: Multiple system atrophy    History of present illness:  Mrs. Mehreen Hale is a patient with an established diagnosis of multiple system atrophy.  She has unfortunately had progressive decline.  I had a video visit with the patient and her caregiver and her transitional care unit and her daughter.    Her daughter Eladio says that the patient remains determined to fight her disease.  She experienced pelvic fractures on December 23, 2020.  She is in severe pain.  She continuously asked for increase in her OxyContin.  This is being adjusted by her primary care physicians and the dose was just increased yesterday.    She had had episodes of hallucinosis and there was some concern that her MSA was causing cognitive problems.  I think this is likely the case.  There are patients who have features of both MSA and Lewy body disease.  Thankfully the hallucinations have been improved with quetiapine.  She is taking quetiapine 25 mg at bedtime.  She has 12.5 mg of quetiapine prescribed on a as needed basis 3 times a day.  Her nurse says she rarely needs it.  She is rarely agitated.    We had reduced her carbidopa levodopa to 25/100, 1 tablet 3 times a day.  She has had some shaking but it is not severe.  She is really not walking or ambulatory.  There is really no clear worsening in her functional status by coming down on the carbidopa levodopa.    Likewise she  had problems with orthostatic hypotension in the past but that is not a problem now.  She is in bed all day.  Because of that she is having some supine hypertension which is being treated with hydralazine.    Her RBD is not so bad now.  She experiences pins-and-needles in her feet which is likely mild peripheral neuropathy which can can happen in MSA.    Examination:  The patient is lying in bed and is alert and quite conversant.  Currently there is no signs of delusion or hallucination.    Her AMRs are slowed in her hands but really nothing else can be seen concerning her parkinsonism.    Impression:  1.  Multiple system atrophy  2.  Cognitive decline likely secondary to her MSA reflecting overlap Lewy body dementia  3.  Pelvic fractures    Recommendations:  1.  I think the patient's primary care team is doing a wonderful job of keeping her stable.  Currently the issues surrounding her MSA are not of concern.  2.  Continue carbidopa/levodopa, 25/100, 1 tablet 3 times a day.    Chris Monterroso MD    30 minutes spent in the review of records review of imaging preparation of report and seeing patient in person.    Type of service:  Video Visit    Video End Time:3:53    Originating Location (pt. Location): Home    Distant Location (provider location):  [facility] [place]     Platform used for Video Visit: [email or web address]

## 2021-01-20 ENCOUNTER — PRE VISIT (OUTPATIENT)
Dept: ORTHOPEDICS | Facility: CLINIC | Age: 81
End: 2021-01-20

## 2021-01-20 DIAGNOSIS — S32.82XD MULTIPLE CLOSED FRACTURES OF PELVIS WITHOUT DISRUPTION OF PELVIC RING WITH ROUTINE HEALING, SUBSEQUENT ENCOUNTER: ICD-10-CM

## 2021-01-20 RX ORDER — OXYCODONE HYDROCHLORIDE 5 MG/1
5 TABLET ORAL EVERY 4 HOURS PRN
Qty: 30 TABLET | Refills: 0 | Status: SHIPPED | OUTPATIENT
Start: 2021-01-20 | End: 2021-02-09

## 2021-01-20 NOTE — TELEPHONE ENCOUNTER
[facility] Controlled Substance Medication Fill:  Mehreen C Jax  1940  Effective Jan 1, 2021, The [place] Board of Pharmacy has a new legislative requirement that requires checking the [place]  database before initially prescribing an opiate and every three months for patients receiving an opiate for treatment of chronic pain.   Request for controlled substance medication:    for Oxycodone medication   checked, and noted last fill of medication recently, however due to patient now being at a SNF or AL, last fill can not accessed by facility staff or patient and medication needs to be filled with facility contracted pharmacy.  And last fill was done by me. And  link did not work.

## 2021-01-27 ENCOUNTER — VIRTUAL VISIT (OUTPATIENT)
Dept: GERIATRICS | Facility: CLINIC | Age: 81
End: 2021-01-27
Payer: COMMERCIAL

## 2021-01-27 ENCOUNTER — RECORDS - HEALTHEAST (OUTPATIENT)
Dept: LAB | Facility: CLINIC | Age: 81
End: 2021-01-27

## 2021-01-27 VITALS
SYSTOLIC BLOOD PRESSURE: 105 MMHG | TEMPERATURE: 97.6 F | HEART RATE: 88 BPM | BODY MASS INDEX: 25.39 KG/M2 | DIASTOLIC BLOOD PRESSURE: 70 MMHG | WEIGHT: 157.3 LBS | OXYGEN SATURATION: 96 % | RESPIRATION RATE: 18 BRPM

## 2021-01-27 DIAGNOSIS — M54.42 CHRONIC BILATERAL LOW BACK PAIN WITH BILATERAL SCIATICA: ICD-10-CM

## 2021-01-27 DIAGNOSIS — K59.01 SLOW TRANSIT CONSTIPATION: ICD-10-CM

## 2021-01-27 DIAGNOSIS — R41.89 COGNITIVE IMPAIRMENT: ICD-10-CM

## 2021-01-27 DIAGNOSIS — F22 PARANOIA (H): ICD-10-CM

## 2021-01-27 DIAGNOSIS — F02.80 LEWY BODY DEMENTIA WITHOUT BEHAVIORAL DISTURBANCE (H): ICD-10-CM

## 2021-01-27 DIAGNOSIS — G31.83 LEWY BODY DEMENTIA WITHOUT BEHAVIORAL DISTURBANCE (H): ICD-10-CM

## 2021-01-27 DIAGNOSIS — S32.82XD MULTIPLE CLOSED FRACTURES OF PELVIS WITHOUT DISRUPTION OF PELVIC RING WITH ROUTINE HEALING, SUBSEQUENT ENCOUNTER: Primary | ICD-10-CM

## 2021-01-27 DIAGNOSIS — M54.41 CHRONIC BILATERAL LOW BACK PAIN WITH BILATERAL SCIATICA: ICD-10-CM

## 2021-01-27 DIAGNOSIS — G89.29 CHRONIC BILATERAL LOW BACK PAIN WITH BILATERAL SCIATICA: ICD-10-CM

## 2021-01-27 DIAGNOSIS — I95.1 ORTHOSTATIC HYPOTENSION DYSAUTONOMIC SYNDROME: ICD-10-CM

## 2021-01-27 DIAGNOSIS — G23.2 MULTIPLE SYSTEM ATROPHY P (H): ICD-10-CM

## 2021-01-27 PROCEDURE — 99309 SBSQ NF CARE MODERATE MDM 30: CPT | Mod: 95 | Performed by: NURSE PRACTITIONER

## 2021-01-27 NOTE — PROGRESS NOTES
"[facility] Geriatrics Video Visit  Mehreen Camara is a 81 year old female who is being evaluated via a billable video visit due to the restrictions of the COVID19 pandemic.The patient has been notified of following: \"This video visit will be conducted via a call between you and your provider. We have found that certain health care needs can be provided without the need for an in-person physical exam.  This service lets us provide the care you need with a video conversation.  If a prescription is necessary we can send it directly to your pharmacy.  If lab work is needed we can place an order for that and you can then stop by our lab to have the test done at a later time. If during the course of the call the provider feels a video visit is not appropriate, you will not be charged for this service.\"     [employer] has received verbal consent for a Video Visit from the patient? Yes    Patient/facility would like the video invitation sent by: Send to e-mail at: [email or web address]    This visit took place virtually, with the patient located at [facility]   ________________________________________________  Video Start Time: 1317  Mehreen Camara complains of    Chief Complaint   Patient presents with     Video Visit     Nursing Home Acute   HPI/Subjective:  Mehreen seen today on routine follow-up after consults w/ team IM and neurology recently. We noted some HTN on review, but of course some hypotension as well given her MSA.     Today, Mehreen is seen sitting up in her room, dressed, alert, laughing smiling. She states her bowels are \"perfect\", and continued to clarify \"well, perfect for me.\" She states the pain is bothersome mostly in certain positions, and that in other positions she can be pain free. Nursing reported one episode of hallucinations/delusions, and Mehreen recalls this as well from last week, but she states this has not reoccurred. Nursing also states she is having " a good day, less anxiety, less behaviors/agitation. As expected w/ her Lewy body dementia and MSA, this will likely wax-and-wane. She denies fever, SOB, CP, headaches. She states that she sometimes has dizziness, but it's not every day and it isn't predictable, this is also part of her known autonomic dysfunction. Therapy states she is making slow but steady progress. Her BP trend is as noted below:  BPs:      History: I have reviewed and updated the patient's Past Medical History, Social History, Family History and Medication List. ALLERGIES: Penicillins, Aspirin, Atenolol, Atorvastatin, Bupropion, Clindamycin, Codeine, Ibuprofen, Lovastatin, and Cephalexin  MEDICATIONS:  Current Outpatient Medications   Medication Sig Dispense Refill     acetaminophen (TYLENOL) 500 MG tablet Take 2 tablets (1,000 mg) by mouth 3 times daily       bisacodyl (DULCOLAX) 10 MG suppository Place 10 mg rectally daily as needed for constipation       calcium carbonate (OS-YADIRA) 500 MG tablet Take 1 tablet (500 mg) by mouth 2 times daily       calcium carbonate (TUMS) 500 MG chewable tablet Take 1 chew tab by mouth daily as needed (indigestion)       carbidopa-levodopa (SINEMET)  MG tablet Take 1 tablet by mouth 3 times daily 180 tablet 11     clotrimazole (LOTRIMIN) 1 % external cream Apply topically 2 times daily as needed       diclofenac (VOLTAREN) 1 % topical gel Apply 2 g topically 4 times daily for 2 days       DULoxetine (CYMBALTA) 20 MG capsule Take 60 mg by mouth daily       gabapentin (NEURONTIN) 300 MG capsule Take 1 capsule (300 mg) by mouth daily (with lunch) AND 2 capsules (600 mg) every morning.       gabapentin (NEURONTIN) 400 MG capsule Take 2 capsules (800 mg) by mouth At Bedtime       hydrALAZINE (APRESOLINE) 10 MG tablet Take 10 mg by mouth 3 times daily as needed for high blood pressure For SBP >170       hydrochlorothiazide (HYDRODIURIL) 25 MG tablet TAKE 1 TABLET BY MOUTH EVERY DAY DX HYPERTENSION AND LE EDEMA  31 tablet 11     ibuprofen (ADVIL/MOTRIN) 400 MG tablet Take 1 tablet (400 mg) by mouth every 6 hours as needed for moderate pain       levothyroxine (SYNTHROID/LEVOTHROID) 100 MCG tablet Take 1 tablet (100 mcg) by mouth daily       Lidocaine (LIDOCARE) 4 % Patch Place 2-3 patches onto the skin every 24 hours Apply to right hip and low back for pain  To prevent lidocaine toxicity, patient should be patch free for 12 hrs daily.       losartan (COZAAR) 50 MG tablet TAKE 1 TABLET BY MOUTH EVERY MORNING 31 tablet 11     melatonin 3 MG tablet Take 1 tablet (3 mg) by mouth every 24 hours       menthol (ICY HOT) 5 % PTCH Apply 1 patch topically every 8 hours as needed for muscle soreness       methocarbamol (ROBAXIN) 500 MG tablet Take 1 tablet (500 mg) by mouth 2 times daily       oxyCODONE (ROXICODONE) 5 MG tablet Take 1 tablet (5 mg) by mouth every 4 hours as needed for severe pain 30 tablet 0     polyethylene glycol (MIRALAX) 17 GM/Dose powder Take 17 g by mouth daily 510 g      polyethylene glycol-propylene glycol (SYSTANE ULTRA) 0.4-0.3 % SOLN ophthalmic solution Place 2 drops into both eyes 3 times daily (and additionally as needed/requested)       QUEtiapine (SEROQUEL) 25 MG tablet Take 1 tablet (25 mg) by mouth At Bedtime       QUEtiapine (SEROQUEL) 25 MG tablet Take 0.5 tablets (12.5 mg) by mouth 3 times daily as needed (agitation)       senna-docusate (SENOKOT-S/PERICOLACE) 8.6-50 MG tablet Take 1-2 tablets by mouth 2 times daily       vitamin D3 (CHOLECALCIFEROL) 50 mcg (2000 units) tablet Take 1 tablet (50 mcg) by mouth daily       VITAMIN D3 25 MCG (1000 UT) tablet TAKE 2 TABLETS (2000IU) BY MOUTH EVERY DAY DX OSTEOPOROSIS 60 tablet 11     REVIEW OF SYSTEMS: Limited secondary to cognitive impairment but today pt reports the above and 6 point ROS of systems including Constitutional, Eyes, Respiratory, Cardiovascular, Gastroenterology, Genitourinary, Integumentary, Musculoskeletal, Psychiatric were all negative  except for pertinent positives noted in my HPI.  Objective:  /70   Pulse 88   Temp 97.6  F (36.4  C)   Resp 18   Wt 71.4 kg (157 lb 4.8 oz)   SpO2 96%   BMI 25.39 kg/m    GENERAL APPEARANCE: Alert, in no distress, cooperative.   EYES/ENT: EOM normal on camera, hearing acuity [place].  RESP: Respiratory effort appears unlabored over video screen, no respiratory distress apparent on video, On RA.   CV: Edema appears 0+ BLE via video. Color appears pale.  ABDOMEN: Appears non-distended, rounded.  SKIN: Skin appears baseline w/ video inspection. No wounds/rashes noted.    NEURO: CN II-XII at patient's baseline, MARMOLEJO at baseline on video. Sensation baseline PPS.  PSYCH: Insight, judgement, and memory are baseline impaired, affect and mood are happy/engaged.    Laboratory/Diagnostics: Reviewed in Epic by provider today.     Assessment/Plan:  Multiple closed fractures of pelvis without disruption of pelvic ring with routine healing, subsequent encounter  Chronic bilateral low back pain with bilateral sciatica  Multiple system atrophy P (H)  Lewy body dementia without behavioral disturbance (H)  Paranoia (H)  Cognitive impairment  Slow transit constipation  Orthostatic hypotension dysautonomic syndrome (H)  Acute-on-chronic. Ongoing.    Despite her significant impairments, Mehreen is making progress in therapy. Her current drug regimen is also assisting in keeping her calm and safe by eliminating most of her hallucinations without sedating her.     We did note some mild anemia on admission, likely secondary to her pelvic trauma. We will trend this, as it can contribute to overall fatigue, weakness, and strength.    We also noted a very good BMP on admission, but since we have noted BP fluctuations, intermittent dizziness and therefore we will also trend this. Her constipation appears to be well-controlled at this time    We appreciate neurology and IM input here and will collaborate for Mehreen as needed.  Follow-up  routinely or as needed.    Orders:  1. Hgb, BMP x1 on 1/29. Dx: anemia.     Video-Visit Details  Type of service:  Video Visit  Video Start Time: 1317  Video End Time (time video stopped): 1329  Originating Location (pt. Location): [facility]  Distant Location (provider location):  [facility] GERIATRIC SERVICES   Mode of Communication:  Video Conference.    Electronically signed by:  Dr. Lisa Parry APRN CNP DNP

## 2021-01-27 NOTE — LETTER
"    1/27/2021        RE: Mehreen Camara  [facility]  [address]  [facility] [address]        [facility] Geriatrics Video Visit  Mehreen Camara is a 81 year old female who is being evaluated via a billable video visit due to the restrictions of the COVID19 pandemic.The patient has been notified of following: \"This video visit will be conducted via a call between you and your provider. We have found that certain health care needs can be provided without the need for an in-person physical exam.  This service lets us provide the care you need with a video conversation.  If a prescription is necessary we can send it directly to your pharmacy.  If lab work is needed we can place an order for that and you can then stop by our lab to have the test done at a later time. If during the course of the call the provider feels a video visit is not appropriate, you will not be charged for this service.\"     [employer] has received verbal consent for a Video Visit from the patient? Yes    Patient/facility would like the video invitation sent by: Send to e-mail at: [email or web address]    This visit took place virtually, with the patient located at [facility]   ________________________________________________  Video Start Time: 1317  Mehreen Camara complains of    Chief Complaint   Patient presents with     Video Visit     Nursing Home Acute   HPI/Subjective:  Mehreen seen today on routine follow-up after consults w/ team IM and neurology recently. We noted some HTN on review, but of course some hypotension as well given her MSA.     Today, Mehreen is seen sitting up in her room, dressed, alert, laughing smiling. She states her bowels are \"perfect\", and continued to clarify \"well, perfect for me.\" She states the pain is bothersome mostly in certain positions, and that in other positions she can be pain free. Nursing reported one episode of hallucinations/delusions, " and Mehreen recalls this as well from last week, but she states this has not reoccurred. Nursing also states she is having a good day, less anxiety, less behaviors/agitation. As expected w/ her Lewy body dementia and MSA, this will likely wax-and-wane. She denies fever, SOB, CP, headaches. She states that she sometimes has dizziness, but it's not every day and it isn't predictable, this is also part of her known autonomic dysfunction. Therapy states she is making slow but steady progress. Her BP trend is as noted below:  BPs:      History: I have reviewed and updated the patient's Past Medical History, Social History, Family History and Medication List. ALLERGIES: Penicillins, Aspirin, Atenolol, Atorvastatin, Bupropion, Clindamycin, Codeine, Ibuprofen, Lovastatin, and Cephalexin  MEDICATIONS:  Current Outpatient Medications   Medication Sig Dispense Refill     acetaminophen (TYLENOL) 500 MG tablet Take 2 tablets (1,000 mg) by mouth 3 times daily       bisacodyl (DULCOLAX) 10 MG suppository Place 10 mg rectally daily as needed for constipation       calcium carbonate (OS-YADIRA) 500 MG tablet Take 1 tablet (500 mg) by mouth 2 times daily       calcium carbonate (TUMS) 500 MG chewable tablet Take 1 chew tab by mouth daily as needed (indigestion)       carbidopa-levodopa (SINEMET)  MG tablet Take 1 tablet by mouth 3 times daily 180 tablet 11     clotrimazole (LOTRIMIN) 1 % external cream Apply topically 2 times daily as needed       diclofenac (VOLTAREN) 1 % topical gel Apply 2 g topically 4 times daily for 2 days       DULoxetine (CYMBALTA) 20 MG capsule Take 60 mg by mouth daily       gabapentin (NEURONTIN) 300 MG capsule Take 1 capsule (300 mg) by mouth daily (with lunch) AND 2 capsules (600 mg) every morning.       gabapentin (NEURONTIN) 400 MG capsule Take 2 capsules (800 mg) by mouth At Bedtime       hydrALAZINE (APRESOLINE) 10 MG tablet Take 10 mg by mouth 3 times daily as needed for high blood pressure For  SBP >170       hydrochlorothiazide (HYDRODIURIL) 25 MG tablet TAKE 1 TABLET BY MOUTH EVERY DAY DX HYPERTENSION AND LE EDEMA 31 tablet 11     ibuprofen (ADVIL/MOTRIN) 400 MG tablet Take 1 tablet (400 mg) by mouth every 6 hours as needed for moderate pain       levothyroxine (SYNTHROID/LEVOTHROID) 100 MCG tablet Take 1 tablet (100 mcg) by mouth daily       Lidocaine (LIDOCARE) 4 % Patch Place 2-3 patches onto the skin every 24 hours Apply to right hip and low back for pain  To prevent lidocaine toxicity, patient should be patch free for 12 hrs daily.       losartan (COZAAR) 50 MG tablet TAKE 1 TABLET BY MOUTH EVERY MORNING 31 tablet 11     melatonin 3 MG tablet Take 1 tablet (3 mg) by mouth every 24 hours       menthol (ICY HOT) 5 % PTCH Apply 1 patch topically every 8 hours as needed for muscle soreness       methocarbamol (ROBAXIN) 500 MG tablet Take 1 tablet (500 mg) by mouth 2 times daily       oxyCODONE (ROXICODONE) 5 MG tablet Take 1 tablet (5 mg) by mouth every 4 hours as needed for severe pain 30 tablet 0     polyethylene glycol (MIRALAX) 17 GM/Dose powder Take 17 g by mouth daily 510 g      polyethylene glycol-propylene glycol (SYSTANE ULTRA) 0.4-0.3 % SOLN ophthalmic solution Place 2 drops into both eyes 3 times daily (and additionally as needed/requested)       QUEtiapine (SEROQUEL) 25 MG tablet Take 1 tablet (25 mg) by mouth At Bedtime       QUEtiapine (SEROQUEL) 25 MG tablet Take 0.5 tablets (12.5 mg) by mouth 3 times daily as needed (agitation)       senna-docusate (SENOKOT-S/PERICOLACE) 8.6-50 MG tablet Take 1-2 tablets by mouth 2 times daily       vitamin D3 (CHOLECALCIFEROL) 50 mcg (2000 units) tablet Take 1 tablet (50 mcg) by mouth daily       VITAMIN D3 25 MCG (1000 UT) tablet TAKE 2 TABLETS (2000IU) BY MOUTH EVERY DAY DX OSTEOPOROSIS 60 tablet 11     REVIEW OF SYSTEMS: Limited secondary to cognitive impairment but today pt reports the above and 6 point ROS of systems including Constitutional, Eyes,  Respiratory, Cardiovascular, Gastroenterology, Genitourinary, Integumentary, Musculoskeletal, Psychiatric were all negative except for pertinent positives noted in my HPI.  Objective:  /70   Pulse 88   Temp 97.6  F (36.4  C)   Resp 18   Wt 71.4 kg (157 lb 4.8 oz)   SpO2 96%   BMI 25.39 kg/m    GENERAL APPEARANCE: Alert, in no distress, cooperative.   EYES/ENT: EOM normal on camera, hearing acuity [place].  RESP: Respiratory effort appears unlabored over video screen, no respiratory distress apparent on video, On RA.   CV: Edema appears 0+ BLE via video. Color appears pale.  ABDOMEN: Appears non-distended, rounded.  SKIN: Skin appears baseline w/ video inspection. No wounds/rashes noted.    NEURO: CN II-XII at patient's baseline, MARMOLEJO at baseline on video. Sensation baseline PPS.  PSYCH: Insight, judgement, and memory are baseline impaired, affect and mood are happy/engaged.    Laboratory/Diagnostics: Reviewed in Epic by provider today.     Assessment/Plan:  Multiple closed fractures of pelvis without disruption of pelvic ring with routine healing, subsequent encounter  Chronic bilateral low back pain with bilateral sciatica  Multiple system atrophy P (H)  Lewy body dementia without behavioral disturbance (H)  Paranoia (H)  Cognitive impairment  Slow transit constipation  Orthostatic hypotension dysautonomic syndrome (H)  Acute-on-chronic. Ongoing.    Despite her significant impairments, Mehreen is making progress in therapy. Her current drug regimen is also assisting in keeping her calm and safe by eliminating most of her hallucinations without sedating her.     We did note some mild anemia on admission, likely secondary to her pelvic trauma. We will trend this, as it can contribute to overall fatigue, weakness, and strength.    We also noted a very good BMP on admission, but since we have noted BP fluctuations, intermittent dizziness and therefore we will also trend this. Her constipation appears to be  well-controlled at this time    We appreciate neurology and IM input here and will collaborate for Mehreen as needed.  Follow-up routinely or as needed.    Orders:  1. Hgb, BMP x1 on 1/29. Dx: anemia.     Video-Visit Details  Type of service:  Video Visit  Video Start Time: 1317  Video End Time (time video stopped): 1329  Originating Location (pt. Location): [facility]  Distant Location (provider location):  [facility] GERIATRIC SERVICES   Mode of Communication:  Video Conference.    Electronically signed by:  HAILEE Gilbert CNP DNP            Sincerely,        HAILEE Langley CNP

## 2021-01-29 ENCOUNTER — TRANSFERRED RECORDS (OUTPATIENT)
Dept: HEALTH INFORMATION MANAGEMENT | Facility: CLINIC | Age: 81
End: 2021-01-29

## 2021-01-29 LAB
ANION GAP SERPL CALCULATED.3IONS-SCNC: 5 MMOL/L (ref 5–18)
ANION GAP SERPL CALCULATED.3IONS-SCNC: 5 MMOL/L (ref 5–18)
BUN SERPL-MCNC: 20 MG/DL (ref 8–28)
BUN SERPL-MCNC: 20 MG/DL (ref 8–28)
CALCIUM SERPL-MCNC: 9.1 MG/DL (ref 8.5–10.5)
CALCIUM SERPL-MCNC: 9.1 MG/DL (ref 8.5–10.5)
CHLORIDE BLD-SCNC: 103 MMOL/L (ref 98–107)
CHLORIDE SERPLBLD-SCNC: 103 MMOL/L (ref 98–107)
CO2 SERPL-SCNC: 33 MMOL/L (ref 22–31)
CO2 SERPL-SCNC: 33 MMOL/L (ref 22–31)
CREAT SERPL-MCNC: 0.62 MG/DL (ref 0.6–1.1)
CREAT SERPL-MCNC: 0.62 MG/DL (ref 0.6–1.1)
GFR SERPL CREATININE-BSD FRML MDRD: >60 ML/MIN/1.73M2
GFR SERPL CREATININE-BSD FRML MDRD: >60 ML/MIN/1.73M2
GLUCOSE BLD-MCNC: 96 MG/DL (ref 70–125)
GLUCOSE SERPL-MCNC: 96 MG/DL (ref 70–125)
HEMOGLOBIN: 12.3 G/DL (ref 12–16)
HGB BLD-MCNC: 12.3 G/DL (ref 12–16)
POTASSIUM BLD-SCNC: 4 MMOL/L (ref 3.5–5)
POTASSIUM SERPL-SCNC: 4 MMOL/L (ref 3.5–5)
SODIUM SERPL-SCNC: 141 MMOL/L (ref 136–145)
SODIUM SERPL-SCNC: 141 MMOL/L (ref 136–145)

## 2021-02-01 ENCOUNTER — TELEPHONE (OUTPATIENT)
Dept: ORTHOPEDICS | Facility: CLINIC | Age: 81
End: 2021-02-01

## 2021-02-01 NOTE — TELEPHONE ENCOUNTER
[facility]    Phone Message    May a detailed message be left on voicemail: yes     Reason for Call: Other: patients daughter is calling to speak with the team before virtual appt. on Fri. 2/5     Action Taken: Message routed to:  [facility]o    Travel Screening: Not Applicable     Pt's daughter wants to be on the call 2/5 but does not know how to add her to the E check in -- Mom is in Nursing home w/ no visitors allowed , she is now in nursing station Eladio wanted to relay new contact for Mehreen - [phone] Jennifer Nurse Manager     Please call back and discuss

## 2021-02-01 NOTE — TELEPHONE ENCOUNTER
LVM stating that as long as patient is ok with it, daughter can provide us an email address for us to send her a video link to join the visit on Friday. Left call back number to provide us with that information.

## 2021-02-05 ENCOUNTER — TELEPHONE (OUTPATIENT)
Dept: ORTHOPEDICS | Facility: CLINIC | Age: 81
End: 2021-02-05

## 2021-02-05 ENCOUNTER — VIRTUAL VISIT (OUTPATIENT)
Dept: ORTHOPEDICS | Facility: CLINIC | Age: 81
End: 2021-02-05
Payer: COMMERCIAL

## 2021-02-05 DIAGNOSIS — S32.591A BILATERAL PUBIC RAMI FRACTURES, CLOSED, INITIAL ENCOUNTER (H): Primary | ICD-10-CM

## 2021-02-05 DIAGNOSIS — S32.592A BILATERAL PUBIC RAMI FRACTURES, CLOSED, INITIAL ENCOUNTER (H): Primary | ICD-10-CM

## 2021-02-05 PROCEDURE — 99203 OFFICE O/P NEW LOW 30 MIN: CPT | Mod: 95 | Performed by: FAMILY MEDICINE

## 2021-02-05 NOTE — LETTER
"  2/5/2021      RE: Mehreen Camara  [facility]  [address]  [facility] [address]       Mehreen is a 81 year old who is being evaluated via a billable telephone visit.      What phone number would you like to be contacted at? [phone]  How would you like to obtain your AVS? MyChart  Assessment & Plan     Bilateral pubic rami fractures, closed, initial encounter (H)  Working with PT, vit D and calcium in diet    - XR Pelvis 1/2 Views; Future    Review of external notes as documented above   In an Asst living situation      30 minutes spent on the date of the encounter doing chart review, review of test results, patient visit, documentation and discussion with family          BMI:   Estimated body mass index is 25.39 kg/m  as calculated from the following:    Height as of 1/18/21: 1.676 m (5' 6\").    Weight as of 1/27/21: 71.4 kg (157 lb 4.8 oz).   Weight management plan: Patient was referred to their PCP to discuss a diet and exercise plan.      Follow-up after pelvis xr  See Patient Instructions    No follow-ups on file.    Sara Rosario MD  [facility]ICINE [facility]     Mehreen is a 81 year old who presents to clinic today for the following health issues  accompanied by her daughter:    HPI       Pt is an 82 yo white female with PMhx of pelvic fractures.   F/U because she hasn't seen Ortho since she left the hospital.  Acute fractures of the right sacral ala, right superior pubic ramus-body, right inferior pubic ramus-body, left superior pubic ramus, and left inferior pubic ramus.  Bone health?  Wants to get up to go to the bathroom.  Pain has been her guide.  Palliative care doctor in the hospital  Hx of vertebral compression fractures, pelvic fractures  Painful on the right side- pelvic fractures  Unable to take a ride to [place] applou   Different therapies- physical therapy.    Review of Systems   CONSTITUTIONAL: NEGATIVE for fever, " chills, change in weight  ENT/MOUTH: NEGATIVE for ear, mouth and throat problems  RESP: NEGATIVE for significant cough or SOB  CV: NEGATIVE for chest pain, palpitations or peripheral edema  MUSCULOSKELETAL: pelvic fractures      Objective           Vitals:  No vitals were obtained today due to virtual visit.    Physical Exam   alert, mild distress and cooperative  PSYCH: Alert and oriented times 3; coherent speech, normal   rate and volume, able to articulate logical thoughts, able   to abstract reason, no tangential thoughts, no hallucinations   or delusions  Her affect is normal  RESP: No cough, no audible wheezing, able to talk in full sentences  Remainder of exam unable to be completed due to telephone visits    Pelvis XR-  IMPRESSION: Redemonstration of known right and left superior and  inferior pubic rami fractures, comminuted on the right. The known  right sacral alar fracture is not well seen on plain radiographs.    I spent a total of 30 minutes face-to-face with Mehreen Camara during today's office visit.  Over 50% of this time was spent counseling the patient and/or coordinating care regarding f/u x-ray, recent hospital stay and imaging.  See note for details.        Phone call duration: 30 minutes      Sara Rosario MD

## 2021-02-05 NOTE — LETTER
"2/5/2021       RE: Mehreen Camara  [facility]  [address]  [facility] [address]     Dear Colleague,    Thank you for referring your patient, Mehreen Camara, to the [facility] at [facility]. Please see a copy of my visit note below.    Mehreen is a 81 year old who is being evaluated via a billable telephone visit.      What phone number would you like to be contacted at? [phone]  How would you like to obtain your AVS? MyChart  Assessment & Plan     Bilateral pubic rami fractures, closed, initial encounter (H)  Working with PT, vit D and calcium in diet    - XR Pelvis 1/2 Views; Future    Review of external notes as documented above   In an Asst living situation      30 minutes spent on the date of the encounter doing chart review, review of test results, patient visit, documentation and discussion with family          BMI:   Estimated body mass index is 25.39 kg/m  as calculated from the following:    Height as of 1/18/21: 1.676 m (5' 6\").    Weight as of 1/27/21: 71.4 kg (157 lb 4.8 oz).   Weight management plan: Patient was referred to their PCP to discuss a diet and exercise plan.      Follow-up after pelvis xr  See Patient Instructions    No follow-ups on file.    Sara Rosario MD  [facility]    Subjective     Mehreen is a 81 year old who presents to clinic today for the following health issues  accompanied by her daughter:    HPI       Pt is an 80 yo white female with PMhx of pelvic fractures.   F/U because she hasn't seen Ortho since she left the hospital.  Acute fractures of the right sacral ala, right superior pubic ramus-body, right inferior pubic ramus-body, left superior pubic ramus, and left inferior pubic ramus.  Bone health?  Wants to get up to go to the bathroom.  Pain has been her guide.  Palliative care doctor in the " hospital  Hx of vertebral compression fractures, pelvic fractures  Painful on the right side- pelvic fractures  Unable to take a ride to MD appt   Different therapies- physical therapy.    Review of Systems   CONSTITUTIONAL: NEGATIVE for fever, chills, change in weight  ENT/MOUTH: NEGATIVE for ear, mouth and throat problems  RESP: NEGATIVE for significant cough or SOB  CV: NEGATIVE for chest pain, palpitations or peripheral edema  MUSCULOSKELETAL: pelvic fractures      Objective           Vitals:  No vitals were obtained today due to virtual visit.    Physical Exam   alert, mild distress and cooperative  PSYCH: Alert and oriented times 3; coherent speech, normal   rate and volume, able to articulate logical thoughts, able   to abstract reason, no tangential thoughts, no hallucinations   or delusions  Her affect is normal  RESP: No cough, no audible wheezing, able to talk in full sentences  Remainder of exam unable to be completed due to telephone visits    Pelvis XR-  IMPRESSION: Redemonstration of known right and left superior and  inferior pubic rami fractures, comminuted on the right. The known  right sacral alar fracture is not well seen on plain radiographs.    I spent a total of 30 minutes face-to-face with Mehreen Camara during today's office visit.  Over 50% of this time was spent counseling the patient and/or coordinating care regarding f/u x-ray, recent hospital stay and imaging.  See note for details.        Phone call duration: 30 minutes      Again, thank you for allowing me to participate in the care of your patient.      Sincerely,    Sara Rosario MD

## 2021-02-05 NOTE — TELEPHONE ENCOUNTER
[facility] Call Center    Phone Message    May a detailed message be left on voicemail: yes     Reason for Call: Other: pt daughter would like to be part of todays appt , per telephone message note email is [email or web address]  [phone]     Action Taken: Message routed to:  [facility] ([facility] Sports [facility]    Travel Screening: Not Applicable

## 2021-02-05 NOTE — PROGRESS NOTES
"Mehreen is a 81 year old who is being evaluated via a billable telephone visit.      What phone number would you like to be contacted at? [phone]  How would you like to obtain your AVS? MyChart  Assessment & Plan     Bilateral pubic rami fractures, closed, initial encounter (H)  Working with PT, vit D and calcium in diet    - XR Pelvis 1/2 Views; Future    Review of external notes as documented above   In an Asst living situation      30 minutes spent on the date of the encounter doing chart review, review of test results, patient visit, documentation and discussion with family          BMI:   Estimated body mass index is 25.39 kg/m  as calculated from the following:    Height as of 1/18/21: 1.676 m (5' 6\").    Weight as of 1/27/21: 71.4 kg (157 lb 4.8 oz).   Weight management plan: Patient was referred to their PCP to discuss a diet and exercise plan.      Follow-up after pelvis xr  See Patient Instructions    No follow-ups on file.    Sara Rosario MD  [facility]     Mehreen is a 81 year old who presents to clinic today for the following health issues  accompanied by her daughter:    HPI       Pt is an 82 yo white female with PMhx of pelvic fractures.   F/U because she hasn't seen Ortho since she left the hospital.  Acute fractures of the right sacral ala, right superior pubic ramus-body, right inferior pubic ramus-body, left superior pubic ramus, and left inferior pubic ramus.  Bone health?  Wants to get up to go to the bathroom.  Pain has been her guide.  Palliative care doctor in the hospital  Hx of vertebral compression fractures, pelvic fractures  Painful on the right side- pelvic fractures  Unable to take a ride to MD appt   Different therapies- physical therapy.    Review of Systems   CONSTITUTIONAL: NEGATIVE for fever, chills, change in weight  ENT/MOUTH: NEGATIVE for ear, mouth and throat problems  RESP: NEGATIVE for significant cough " or SOB  CV: NEGATIVE for chest pain, palpitations or peripheral edema  MUSCULOSKELETAL: pelvic fractures      Objective           Vitals:  No vitals were obtained today due to virtual visit.    Physical Exam   alert, mild distress and cooperative  PSYCH: Alert and oriented times 3; coherent speech, normal   rate and volume, able to articulate logical thoughts, able   to abstract reason, no tangential thoughts, no hallucinations   or delusions  Her affect is normal  RESP: No cough, no audible wheezing, able to talk in full sentences  Remainder of exam unable to be completed due to telephone visits    Pelvis XR-  IMPRESSION: Redemonstration of known right and left superior and  inferior pubic rami fractures, comminuted on the right. The known  right sacral alar fracture is not well seen on plain radiographs.    I spent a total of 30 minutes face-to-face with Mehreen Camara during today's office visit.  Over 50% of this time was spent counseling the patient and/or coordinating care regarding f/u x-ray, recent hospital stay and imaging.  See note for details.        Phone call duration: 30 minutes

## 2021-02-09 ENCOUNTER — VIRTUAL VISIT (OUTPATIENT)
Dept: GERIATRICS | Facility: CLINIC | Age: 81
End: 2021-02-09
Payer: COMMERCIAL

## 2021-02-09 VITALS
SYSTOLIC BLOOD PRESSURE: 114 MMHG | HEART RATE: 92 BPM | DIASTOLIC BLOOD PRESSURE: 76 MMHG | OXYGEN SATURATION: 95 % | WEIGHT: 157.5 LBS | BODY MASS INDEX: 25.42 KG/M2 | TEMPERATURE: 97.6 F | RESPIRATION RATE: 18 BRPM

## 2021-02-09 DIAGNOSIS — K59.01 SLOW TRANSIT CONSTIPATION: ICD-10-CM

## 2021-02-09 DIAGNOSIS — S32.591D FRACTURE OF MULTIPLE PUBIC RAMI WITH ROUTINE HEALING, RIGHT: ICD-10-CM

## 2021-02-09 DIAGNOSIS — F02.80 LEWY BODY DEMENTIA WITHOUT BEHAVIORAL DISTURBANCE (H): ICD-10-CM

## 2021-02-09 DIAGNOSIS — F22 PARANOIA (H): ICD-10-CM

## 2021-02-09 DIAGNOSIS — G23.2 MULTIPLE SYSTEM ATROPHY P (H): ICD-10-CM

## 2021-02-09 DIAGNOSIS — G31.83 LEWY BODY DEMENTIA WITHOUT BEHAVIORAL DISTURBANCE (H): ICD-10-CM

## 2021-02-09 DIAGNOSIS — S32.82XA MULTIPLE CLOSED FRACTURES OF PELVIS WITHOUT DISRUPTION OF PELVIC RING, INITIAL ENCOUNTER (H): ICD-10-CM

## 2021-02-09 DIAGNOSIS — G89.29 CHRONIC BILATERAL LOW BACK PAIN WITH BILATERAL SCIATICA: ICD-10-CM

## 2021-02-09 DIAGNOSIS — I10 ESSENTIAL HYPERTENSION: ICD-10-CM

## 2021-02-09 DIAGNOSIS — M54.42 CHRONIC BILATERAL LOW BACK PAIN WITH BILATERAL SCIATICA: ICD-10-CM

## 2021-02-09 DIAGNOSIS — M15.0 PRIMARY OSTEOARTHRITIS INVOLVING MULTIPLE JOINTS: ICD-10-CM

## 2021-02-09 DIAGNOSIS — I95.1 ORTHOSTATIC HYPOTENSION DYSAUTONOMIC SYNDROME: ICD-10-CM

## 2021-02-09 DIAGNOSIS — S32.82XD MULTIPLE CLOSED FRACTURES OF PELVIS WITHOUT DISRUPTION OF PELVIC RING WITH ROUTINE HEALING, SUBSEQUENT ENCOUNTER: Primary | ICD-10-CM

## 2021-02-09 DIAGNOSIS — M54.41 CHRONIC BILATERAL LOW BACK PAIN WITH BILATERAL SCIATICA: ICD-10-CM

## 2021-02-09 PROCEDURE — 99310 SBSQ NF CARE HIGH MDM 45: CPT | Mod: 95 | Performed by: NURSE PRACTITIONER

## 2021-02-09 RX ORDER — OXYCODONE HYDROCHLORIDE 5 MG/1
5 TABLET ORAL EVERY 4 HOURS PRN
Qty: 30 TABLET | Refills: 0 | Status: SHIPPED | OUTPATIENT
Start: 2021-02-09 | End: 2021-02-24

## 2021-02-09 NOTE — LETTER
"    2/9/2021        RE: Mehreen Camara  [facility]  [address]  [facility] [address]        [facility] Video Visit  Mehreen Camara is a 81 year old female who is being evaluated via a billable video visit due to the restrictions of the COVID19 pandemic.The patient has been notified of following: \"This video visit will be conducted via a call between you and your provider. We have found that certain health care needs can be provided without the need for an in-person physical exam.  This service lets us provide the care you need with a video conversation.  If a prescription is necessary we can send it directly to your pharmacy.  If lab work is needed we can place an order for that and you can then stop by our lab to have the test done at a later time. If during the course of the call the provider feels a video visit is not appropriate, you will not be charged for this service.\"     Proivder has received verbal consent for a Video Visit from the patient? Yes    Patient/facility would like the video invitation sent by: Send to e-mail at: [email or web address]     This visit took place virtually, with the patient located at [facility]   ________________________________________________  Video Start Time: 1430  Mehreen Camara complains of    Chief Complaint   Patient presents with     Video Visit     Nursing Home Acute   HPI/Subjective:  Mehreen seen today for routine follow-up after the care team reviewed her progress in interdisciplinary rounds. She is now walking up to 150ft w/ 2ww and able to complete many ADLs with minimal assistance. No recent medication changes have been made, and her recent blood work from 1/29 is grossly WDL.    Today, Mehreen states that in certain positions, specifically while sitting, or moving a certain way, she experiences a shooting/traveling pain down her legs. She describes this is sharp and burning at times. " "She also complains of a right groin pain which is also positional. She states her bowels are actually moving pretty well. She denies nausea, but states she does have intermittent heartburn/reflux/bloating, which she has been using Tums for. She would like something else for this too, because she states she bloats up like a \"watermelon.\" She denies headaches, but states that she did have a dizzy spell within the last week. We do note a BP swing low on chart review and counseled Mehreen that this could be part of her MSA. She states her appetite is good and she is sleeping well. She denies cough, SOB, fever, CP. Mehreen's daughter Kaykay was listening during visit via a phone call with patient.     History: I have reviewed and updated the patient's Past Medical History, Social History, Family History and Medication List. ALLERGIES: Penicillins, Aspirin, Atenolol, Atorvastatin, Bupropion, Clindamycin, Codeine, Ibuprofen, Lovastatin, and Cephalexin  MEDICATIONS:  Current Outpatient Medications   Medication Sig Dispense Refill     gabapentin (NEURONTIN) 300 MG capsule Take 2 capsules (600 mg) by mouth 2 times daily       oxyCODONE (ROXICODONE) 5 MG tablet Take 1 tablet (5 mg) by mouth every 4 hours as needed for severe pain 30 tablet 0     simethicone (MYLICON) 125 MG chewable tablet Take 125 mg by mouth 3 times daily as needed       acetaminophen (TYLENOL) 500 MG tablet Take 2 tablets (1,000 mg) by mouth 3 times daily       bisacodyl (DULCOLAX) 10 MG suppository Place 10 mg rectally daily as needed for constipation       calcium carbonate (OS-YADIRA) 500 MG tablet Take 1 tablet (500 mg) by mouth 2 times daily       calcium carbonate (TUMS) 500 MG chewable tablet Take 1 chew tab by mouth daily as needed (indigestion)       carbidopa-levodopa (SINEMET)  MG tablet Take 1 tablet by mouth 3 times daily 180 tablet 11     clotrimazole (LOTRIMIN) 1 % external cream Apply topically 2 times daily as needed       diclofenac " (VOLTAREN) 1 % topical gel Apply 2 g topically 4 times daily for 2 days       DULoxetine (CYMBALTA) 20 MG capsule Take 60 mg by mouth daily       gabapentin (NEURONTIN) 400 MG capsule Take 2 capsules (800 mg) by mouth At Bedtime       hydrALAZINE (APRESOLINE) 10 MG tablet Take 10 mg by mouth 3 times daily as needed for high blood pressure For SBP >170       hydrochlorothiazide (HYDRODIURIL) 25 MG tablet TAKE 1 TABLET BY MOUTH EVERY DAY DX HYPERTENSION AND LE EDEMA 31 tablet 11     levothyroxine (SYNTHROID/LEVOTHROID) 100 MCG tablet Take 1 tablet (100 mcg) by mouth daily       Lidocaine (LIDOCARE) 4 % Patch Place 2-3 patches onto the skin every 24 hours Apply to right hip and low back for pain  To prevent lidocaine toxicity, patient should be patch free for 12 hrs daily.       losartan (COZAAR) 50 MG tablet TAKE 1 TABLET BY MOUTH EVERY MORNING 31 tablet 11     melatonin 3 MG tablet Take 1 tablet (3 mg) by mouth every 24 hours       menthol (ICY HOT) 5 % PTCH Apply 1 patch topically every 8 hours as needed for muscle soreness       methocarbamol (ROBAXIN) 500 MG tablet Take 1 tablet (500 mg) by mouth 2 times daily       polyethylene glycol (MIRALAX) 17 GM/Dose powder Take 17 g by mouth daily 510 g      polyethylene glycol-propylene glycol (SYSTANE ULTRA) 0.4-0.3 % SOLN ophthalmic solution Place 2 drops into both eyes 3 times daily (and additionally as needed/requested)       QUEtiapine (SEROQUEL) 25 MG tablet Take 1 tablet (25 mg) by mouth At Bedtime       QUEtiapine (SEROQUEL) 25 MG tablet Take 0.5 tablets (12.5 mg) by mouth 3 times daily as needed (agitation)       senna-docusate (SENOKOT-S/PERICOLACE) 8.6-50 MG tablet Take 1-2 tablets by mouth 2 times daily       vitamin D3 (CHOLECALCIFEROL) 50 mcg (2000 units) tablet Take 1 tablet (50 mcg) by mouth daily       VITAMIN D3 25 MCG (1000 UT) tablet TAKE 2 TABLETS (2000IU) BY MOUTH EVERY DAY DX OSTEOPOROSIS 60 tablet 11     REVIEW OF SYSTEMS: Limited secondary to  cognitive impairment but today pt reports the above and 4 point ROS including Respiratory, CV, GI and , other than that noted in the HPI, is negative.  Objective:  /76   Pulse 92   Temp 97.6  F (36.4  C)   Resp 18   Wt 71.4 kg (157 lb 8 oz)   SpO2 95%   BMI 25.42 kg/m    GENERAL APPEARANCE: Alert, in no distress, cooperative.   EYES/ENT: EOM normal on camera, hearing acuity [place].  RESP: Respiratory effort appears unlabored over video screen, no respiratory distress apparent on video, On RA.  ABDOMEN: Appears non-distended, rounded.  SKIN: Skin appears baseline w/ video inspection. No wounds/rashes noted.    NEURO: CN II-XII at patient's baseline, MARMOLEJO at baseline on video. Sensation baseline PPS.  PSYCH: Insight, judgement, and memory are baseline impaired, affect and mood are happy/engaged.    Laboratory/Diagnostics: Reviewed in Epic by provider today.     Assessment/Plan:  Multiple closed fractures of pelvis without disruption of pelvic ring with routine healing, subsequent encounter  Fracture of multiple pubic rami with routine healing, right  Chronic bilateral low back pain with bilateral sciatica  Multiple system atrophy P (H)  Lewy body dementia without behavioral disturbance (H)  Paranoia (H)  Slow transit constipation  Orthostatic hypotension dysautonomic syndrome (H)  Primary osteoarthritis involving multiple joints  Essential hypertension  Acute-on-chronic. Ongoing.    We note that Mehreen's MSA and Lewy Body Dementia are progressive, but she is working hard in therapy to optimize her mobility and overall function. We anticipate she may need long-term care support or perhaps an [facility] memory care environment.  We note that the occasional use of PRN Seroquel is helpful for when Mehreen has delusional thoughts/behavior, she is easily redirected and safety is maintained with PRN dosing. We will renew this. Noted PRN orders for antipsychotic medications are limited to 14 days. Face to Face encounter  done today. Evaluation indicates it is appropriate and necessary to continue this medication for 14 days.   We note Mehreen's use of opioids worsens her slow-transit constipation, and may also contribute to bloating, though we also suspect a potential food sensitivity. We will restart PRN Simethicone, which she has successfully used in the past.     We acknowledge Mehreen's significant pain from pelvic fractures. We counseled Mehreen on the healing time (at least 12 weeks) for this fracture, and acknowledge that she is at the 6 week primo. Given the quality descriptors of her pain, being likely more neuropathic in nature, we will increase her daytime Gabapentin. We also sent a refill of Oxycodone to the pharmacy for her.     We coordinated care with nursing,  and rehabilitation services who all note Mehreen's physical progress is appropriate for this part in her healing process. We suspect she may be ready for discharge w/in 2 weeks, but it is unclear if she will need more services at her snf or if another arrangement needs to be made. We are available to  to assist here.  Follow-up w/in 2 weeks or as needed.     Orders:  1. RENEW Seroquel PRN x 14 days. Dx: hallucinations/delusions.  2. Increase Gabapentin to 600mg BID, and 800mg at bedtime. Dx: neuropathic pain.   3. Oxycodone refilled.   4. Simethicone 125mg chewable, PO TID PRN. Dx: bloating/pain/gas.    Total time spent with patient visit was 35 min including patient visit and review of past records. Greater than 50% of total time spent with counseling and coordinating care with rehabilitation services, nursing, and  as noted above.     Video-Visit Details  Type of service:  Video Visit  Video Start Time: 1430  Video End Time (time video stopped): 1453  Originating Location (pt. Location): [facility]  Distant Location (provider location):  [facility] TRANSITIONAL CARE   Mode of Communication:  Video  Conference.    Electronically signed by:  Dr. Lisa Parry, APRN CNP DNP            Sincerely,        HAILEE Langley CNP

## 2021-02-10 NOTE — PROGRESS NOTES
"[facility] Video Visit  Mehreen Camara is a 81 year old female who is being evaluated via a billable video visit due to the restrictions of the COVID19 pandemic.The patient has been notified of following: \"This video visit will be conducted via a call between you and your provider. We have found that certain health care needs can be provided without the need for an in-person physical exam.  This service lets us provide the care you need with a video conversation.  If a prescription is necessary we can send it directly to your pharmacy.  If lab work is needed we can place an order for that and you can then stop by our lab to have the test done at a later time. If during the course of the call the provider feels a video visit is not appropriate, you will not be charged for this service.\"     Proivder has received verbal consent for a Video Visit from the patient? Yes    Patient/facility would like the video invitation sent by: Send to e-mail at: [email or web address]     This visit took place virtually, with the patient located at [facility]   ________________________________________________  Video Start Time: 1430  Mehreen Camara complains of    Chief Complaint   Patient presents with     Video Visit     Nursing Home Acute   HPI/Subjective:  Mehreen seen today for routine follow-up after the care team reviewed her progress in interdisciplinary rounds. She is now walking up to 150ft w/ 2ww and able to complete many ADLs with minimal assistance. No recent medication changes have been made, and her recent blood work from 1/29 is grossly WDL.    Today, Mehreen states that in certain positions, specifically while sitting, or moving a certain way, she experiences a shooting/traveling pain down her legs. She describes this is sharp and burning at times. She also complains of a right groin pain which is also positional. She states her bowels are actually moving pretty " "well. She denies nausea, but states she does have intermittent heartburn/reflux/bloating, which she has been using Tums for. She would like something else for this too, because she states she bloats up like a \"watermelon.\" She denies headaches, but states that she did have a dizzy spell within the last week. We do note a BP swing low on chart review and counseled Mehreen that this could be part of her MSA. She states her appetite is good and she is sleeping well. She denies cough, SOB, fever, CP. Mehreen's daughter Kaykay was listening during visit via a phone call with patient.     History: I have reviewed and updated the patient's Past Medical History, Social History, Family History and Medication List. ALLERGIES: Penicillins, Aspirin, Atenolol, Atorvastatin, Bupropion, Clindamycin, Codeine, Ibuprofen, Lovastatin, and Cephalexin  MEDICATIONS:  Current Outpatient Medications   Medication Sig Dispense Refill     gabapentin (NEURONTIN) 300 MG capsule Take 2 capsules (600 mg) by mouth 2 times daily       oxyCODONE (ROXICODONE) 5 MG tablet Take 1 tablet (5 mg) by mouth every 4 hours as needed for severe pain 30 tablet 0     simethicone (MYLICON) 125 MG chewable tablet Take 125 mg by mouth 3 times daily as needed       acetaminophen (TYLENOL) 500 MG tablet Take 2 tablets (1,000 mg) by mouth 3 times daily       bisacodyl (DULCOLAX) 10 MG suppository Place 10 mg rectally daily as needed for constipation       calcium carbonate (OS-YADIRA) 500 MG tablet Take 1 tablet (500 mg) by mouth 2 times daily       calcium carbonate (TUMS) 500 MG chewable tablet Take 1 chew tab by mouth daily as needed (indigestion)       carbidopa-levodopa (SINEMET)  MG tablet Take 1 tablet by mouth 3 times daily 180 tablet 11     clotrimazole (LOTRIMIN) 1 % external cream Apply topically 2 times daily as needed       diclofenac (VOLTAREN) 1 % topical gel Apply 2 g topically 4 times daily for 2 days       DULoxetine (CYMBALTA) 20 MG capsule Take 60 " mg by mouth daily       gabapentin (NEURONTIN) 400 MG capsule Take 2 capsules (800 mg) by mouth At Bedtime       hydrALAZINE (APRESOLINE) 10 MG tablet Take 10 mg by mouth 3 times daily as needed for high blood pressure For SBP >170       hydrochlorothiazide (HYDRODIURIL) 25 MG tablet TAKE 1 TABLET BY MOUTH EVERY DAY DX HYPERTENSION AND LE EDEMA 31 tablet 11     levothyroxine (SYNTHROID/LEVOTHROID) 100 MCG tablet Take 1 tablet (100 mcg) by mouth daily       Lidocaine (LIDOCARE) 4 % Patch Place 2-3 patches onto the skin every 24 hours Apply to right hip and low back for pain  To prevent lidocaine toxicity, patient should be patch free for 12 hrs daily.       losartan (COZAAR) 50 MG tablet TAKE 1 TABLET BY MOUTH EVERY MORNING 31 tablet 11     melatonin 3 MG tablet Take 1 tablet (3 mg) by mouth every 24 hours       menthol (ICY HOT) 5 % PTCH Apply 1 patch topically every 8 hours as needed for muscle soreness       methocarbamol (ROBAXIN) 500 MG tablet Take 1 tablet (500 mg) by mouth 2 times daily       polyethylene glycol (MIRALAX) 17 GM/Dose powder Take 17 g by mouth daily 510 g      polyethylene glycol-propylene glycol (SYSTANE ULTRA) 0.4-0.3 % SOLN ophthalmic solution Place 2 drops into both eyes 3 times daily (and additionally as needed/requested)       QUEtiapine (SEROQUEL) 25 MG tablet Take 1 tablet (25 mg) by mouth At Bedtime       QUEtiapine (SEROQUEL) 25 MG tablet Take 0.5 tablets (12.5 mg) by mouth 3 times daily as needed (agitation)       senna-docusate (SENOKOT-S/PERICOLACE) 8.6-50 MG tablet Take 1-2 tablets by mouth 2 times daily       vitamin D3 (CHOLECALCIFEROL) 50 mcg (2000 units) tablet Take 1 tablet (50 mcg) by mouth daily       VITAMIN D3 25 MCG (1000 UT) tablet TAKE 2 TABLETS (2000IU) BY MOUTH EVERY DAY DX OSTEOPOROSIS 60 tablet 11     REVIEW OF SYSTEMS: Limited secondary to cognitive impairment but today pt reports the above and 4 point ROS including Respiratory, CV, GI and , other than that noted  in the HPI, is negative.  Objective:  /76   Pulse 92   Temp 97.6  F (36.4  C)   Resp 18   Wt 71.4 kg (157 lb 8 oz)   SpO2 95%   BMI 25.42 kg/m    GENERAL APPEARANCE: Alert, in no distress, cooperative.   EYES/ENT: EOM normal on camera, hearing acuity [place].  RESP: Respiratory effort appears unlabored over video screen, no respiratory distress apparent on video, On RA.  ABDOMEN: Appears non-distended, rounded.  SKIN: Skin appears baseline w/ video inspection. No wounds/rashes noted.    NEURO: CN II-XII at patient's baseline, MAR[place] at baseline on video. Sensation baseline PPS.  PSYCH: Insight, judgement, and memory are baseline impaired, affect and mood are happy/engaged.    Laboratory/Diagnostics: Reviewed in Epic by provider today.     Assessment/Plan:  Multiple closed fractures of pelvis without disruption of pelvic ring with routine healing, subsequent encounter  Fracture of multiple pubic rami with routine healing, right  Chronic bilateral low back pain with bilateral sciatica  Multiple system atrophy P (H)  Lewy body dementia without behavioral disturbance (H)  Paranoia (H)  Slow transit constipation  Orthostatic hypotension dysautonomic syndrome (H)  Primary osteoarthritis involving multiple joints  Essential hypertension  Acute-on-chronic. Ongoing.    We note that Mehreen's MSA and Lewy Body Dementia are progressive, but she is working hard in therapy to optimize her mobility and overall function. We anticipate she may need long-term care support or perhaps an [facility] memory care environment.  We note that the occasional use of PRN Seroquel is helpful for when Mehreen has delusional thoughts/behavior, she is easily redirected and safety is maintained with PRN dosing. We will renew this. Noted PRN orders for antipsychotic medications are limited to 14 days. Face to Face encounter done today. Evaluation indicates it is appropriate and necessary to continue this medication for 14 days.   We note Mehreen's use of  opioids worsens her slow-transit constipation, and may also contribute to bloating, though we also suspect a potential food sensitivity. We will restart PRN Simethicone, which she has successfully used in the past.     We acknowledge Mehreen's significant pain from pelvic fractures. We counseled Mehreen on the healing time (at least 12 weeks) for this fracture, and acknowledge that she is at the 6 week primo. Given the quality descriptors of her pain, being likely more neuropathic in nature, we will increase her daytime Gabapentin. We also sent a refill of Oxycodone to the pharmacy for her.     We coordinated care with nursing,  and rehabilitation services who all note Mehreen's physical progress is appropriate for this part in her healing process. We suspect she may be ready for discharge w/in 2 weeks, but it is unclear if she will need more services at her [facility] or if another arrangement needs to be made. We are available to  to assist here.  Follow-up w/in 2 weeks or as needed.     Orders:  1. RENEW Seroquel PRN x 14 days. Dx: hallucinations/delusions.  2. Increase Gabapentin to 600mg BID, and 800mg at bedtime. Dx: neuropathic pain.   3. Oxycodone refilled.   4. Simethicone 125mg chewable, PO TID PRN. Dx: bloating/pain/gas.    Total time spent with patient visit was 35 min including patient visit and review of past records. Greater than 50% of total time spent with counseling and coordinating care with rehabilitation services, nursing, and  as noted above.     Video-Visit Details  Type of service:  Video Visit  Video Start Time: 1430  Video End Time (time video stopped): 1453  Originating Location (pt. Location): [facility]  Distant Location (provider location):  [facility]   Mode of Communication:  Video Conference.    Electronically signed by:  Dr. Lisa Parry, APRN CNP DNP

## 2021-02-12 RX ORDER — SIMETHICONE 125 MG
125 TABLET,CHEWABLE ORAL 2 TIMES DAILY
COMMUNITY
End: 2023-01-01

## 2021-02-12 RX ORDER — GABAPENTIN 300 MG/1
600 CAPSULE ORAL 2 TIMES DAILY
Start: 2021-02-12 | End: 2021-03-19 | Stop reason: DRUGHIGH

## 2021-02-17 ENCOUNTER — NURSING HOME VISIT (OUTPATIENT)
Dept: GERIATRICS | Facility: CLINIC | Age: 81
End: 2021-02-17
Payer: COMMERCIAL

## 2021-02-17 VITALS
OXYGEN SATURATION: 97 % | HEIGHT: 66 IN | SYSTOLIC BLOOD PRESSURE: 95 MMHG | BODY MASS INDEX: 25.33 KG/M2 | DIASTOLIC BLOOD PRESSURE: 68 MMHG | RESPIRATION RATE: 18 BRPM | TEMPERATURE: 97.5 F | HEART RATE: 77 BPM | WEIGHT: 157.6 LBS

## 2021-02-17 DIAGNOSIS — S32.82XD MULTIPLE CLOSED FRACTURES OF PELVIS WITHOUT DISRUPTION OF PELVIC RING WITH ROUTINE HEALING, SUBSEQUENT ENCOUNTER: Primary | ICD-10-CM

## 2021-02-17 DIAGNOSIS — S32.591D FRACTURE OF MULTIPLE PUBIC RAMI WITH ROUTINE HEALING, RIGHT: ICD-10-CM

## 2021-02-17 DIAGNOSIS — G23.2 MULTIPLE SYSTEM ATROPHY P (H): ICD-10-CM

## 2021-02-17 DIAGNOSIS — M54.41 CHRONIC BILATERAL LOW BACK PAIN WITH BILATERAL SCIATICA: ICD-10-CM

## 2021-02-17 DIAGNOSIS — F02.80 LEWY BODY DEMENTIA WITHOUT BEHAVIORAL DISTURBANCE (H): ICD-10-CM

## 2021-02-17 DIAGNOSIS — K59.01 SLOW TRANSIT CONSTIPATION: ICD-10-CM

## 2021-02-17 DIAGNOSIS — M54.42 CHRONIC BILATERAL LOW BACK PAIN WITH BILATERAL SCIATICA: ICD-10-CM

## 2021-02-17 DIAGNOSIS — F22 PARANOIA (H): ICD-10-CM

## 2021-02-17 DIAGNOSIS — M15.0 PRIMARY OSTEOARTHRITIS INVOLVING MULTIPLE JOINTS: ICD-10-CM

## 2021-02-17 DIAGNOSIS — I95.1 ORTHOSTATIC HYPOTENSION DYSAUTONOMIC SYNDROME: ICD-10-CM

## 2021-02-17 DIAGNOSIS — G31.83 LEWY BODY DEMENTIA WITHOUT BEHAVIORAL DISTURBANCE (H): ICD-10-CM

## 2021-02-17 DIAGNOSIS — G89.29 CHRONIC BILATERAL LOW BACK PAIN WITH BILATERAL SCIATICA: ICD-10-CM

## 2021-02-17 PROBLEM — R14.0 BLOATING: Status: ACTIVE | Noted: 2017-05-05

## 2021-02-17 PROBLEM — K56.609 INTESTINAL OBSTRUCTION (H): Status: ACTIVE | Noted: 2020-03-04

## 2021-02-17 PROBLEM — G93.41 METABOLIC ENCEPHALOPATHY: Status: ACTIVE | Noted: 2021-02-17

## 2021-02-17 PROBLEM — K44.9 DIAPHRAGMATIC HERNIA: Status: ACTIVE | Noted: 2018-09-18

## 2021-02-17 PROBLEM — Z78.0 ASYMPTOMATIC POSTMENOPAUSAL STATUS: Status: ACTIVE | Noted: 2021-02-17

## 2021-02-17 PROBLEM — K83.8 CHOLANGIECTASIS: Status: ACTIVE | Noted: 2021-02-17

## 2021-02-17 PROBLEM — H26.9 CATARACT: Status: ACTIVE | Noted: 2018-09-17

## 2021-02-17 PROBLEM — R29.898 WEAKNESS OF BOTH LOWER EXTREMITIES: Status: ACTIVE | Noted: 2018-07-05

## 2021-02-17 PROBLEM — K83.9 DISORDER OF BILIARY TRACT: Status: ACTIVE | Noted: 2020-03-16

## 2021-02-17 PROBLEM — F32.1 MAJOR DEPRESSIVE DISORDER, SINGLE EPISODE, MODERATE (H): Status: ACTIVE | Noted: 2021-02-17

## 2021-02-17 PROBLEM — R19.8 DIGESTIVE SYMPTOM: Status: ACTIVE | Noted: 2019-04-18

## 2021-02-17 PROBLEM — K63.9 DISORDER OF INTESTINE: Status: ACTIVE | Noted: 2019-04-04

## 2021-02-17 PROBLEM — R53.1 WEAK: Status: ACTIVE | Noted: 2017-05-04

## 2021-02-17 PROBLEM — K56.600 PARTIAL SMALL BOWEL OBSTRUCTION (H): Status: ACTIVE | Noted: 2021-02-17

## 2021-02-17 PROBLEM — Z78.9 LIVES IN ASSISTED LIVING FACILITY: Status: ACTIVE | Noted: 2020-05-16

## 2021-02-17 PROBLEM — R19.8 IRREGULAR BOWEL HABITS: Status: ACTIVE | Noted: 2021-02-17

## 2021-02-17 PROBLEM — F33.9 MAJOR DEPRESSIVE DISORDER, RECURRENT EPISODE (H): Status: ACTIVE | Noted: 2018-09-17

## 2021-02-17 PROBLEM — M89.9 DISORDER OF BONE AND CARTILAGE: Status: ACTIVE | Noted: 2018-09-17

## 2021-02-17 PROBLEM — R46.89 COGNITIVE AND BEHAVIORAL CHANGES: Status: ACTIVE | Noted: 2021-02-17

## 2021-02-17 PROBLEM — M62.89 PELVIC FLOOR DYSFUNCTION: Status: ACTIVE | Noted: 2021-02-17

## 2021-02-17 PROBLEM — F51.05 INSOMNIA DUE TO MENTAL CONDITION: Status: ACTIVE | Noted: 2017-07-04

## 2021-02-17 PROBLEM — K31.9 DISORDER OF STOMACH: Status: ACTIVE | Noted: 2018-08-15

## 2021-02-17 PROBLEM — K21.00 GASTRO-ESOPHAGEAL REFLUX DISEASE WITH ESOPHAGITIS: Status: ACTIVE | Noted: 2017-06-06

## 2021-02-17 PROBLEM — R45.1 AGITATION: Status: ACTIVE | Noted: 2021-02-17

## 2021-02-17 PROBLEM — S32.000A COMPRESSION FRACTURE OF LUMBAR VERTEBRA (H): Status: ACTIVE | Noted: 2020-10-10

## 2021-02-17 PROBLEM — G47.9 SLEEP DIFFICULTIES: Status: ACTIVE | Noted: 2021-02-17

## 2021-02-17 PROBLEM — R11.2 NAUSEA AND VOMITING: Status: ACTIVE | Noted: 2021-02-17

## 2021-02-17 PROBLEM — R41.89 COGNITIVE AND BEHAVIORAL CHANGES: Status: ACTIVE | Noted: 2021-02-17

## 2021-02-17 PROBLEM — E78.5 DYSLIPIDEMIA: Status: ACTIVE | Noted: 2018-09-17

## 2021-02-17 PROBLEM — K21.9 GASTROESOPHAGEAL REFLUX DISEASE WITHOUT ESOPHAGITIS: Status: ACTIVE | Noted: 2018-08-13

## 2021-02-17 PROBLEM — I35.0 NONRHEUMATIC AORTIC VALVE STENOSIS: Status: ACTIVE | Noted: 2018-09-19

## 2021-02-17 PROBLEM — K29.70 GASTRITIS: Status: ACTIVE | Noted: 2018-08-13

## 2021-02-17 PROBLEM — H93.19 TINNITUS: Status: ACTIVE | Noted: 2021-02-17

## 2021-02-17 PROBLEM — R42 DIZZINESS: Status: ACTIVE | Noted: 2017-07-03

## 2021-02-17 PROBLEM — E66.9 OBESITY: Status: ACTIVE | Noted: 2021-02-17

## 2021-02-17 PROBLEM — K86.89 DILATED PANCREATIC DUCT: Status: ACTIVE | Noted: 2021-02-17

## 2021-02-17 PROBLEM — M94.9 DISORDER OF BONE AND CARTILAGE: Status: ACTIVE | Noted: 2018-09-17

## 2021-02-17 PROBLEM — F06.30 MOOD DISORDER DUE TO A GENERAL MEDICAL CONDITION: Status: ACTIVE | Noted: 2021-02-17

## 2021-02-17 PROBLEM — M62.81 GENERALIZED MUSCLE WEAKNESS: Status: ACTIVE | Noted: 2018-07-04

## 2021-02-17 PROCEDURE — 99309 SBSQ NF CARE MODERATE MDM 30: CPT | Performed by: NURSE PRACTITIONER

## 2021-02-17 ASSESSMENT — MIFFLIN-ST. JEOR: SCORE: 1196.62

## 2021-02-17 NOTE — LETTER
"    2/17/2021        RE: Mehreen Camara  [place]  [address]  [address]  [facility] [address]        [facility]  Episodic/Acute/Follow-Up  [place] MRN: [id number]. Place of Service where encounter took place:  [facility] ([facility]) [[id number]]  Chief Complaint   Patient presents with     RECHECK   HPI: Mehreen Camara  is a 81 year old (1940), who is being seen today for an episodic care visit.  HPI information obtained from: facility chart records, facility staff, patient report, [facility] chart review and Care Everywhere Psychiatric chart review. Today's concern is:    Mehreen had been doing well, but today she is full of anxiety and is worried about her muscle relaxer. She is so fixated on this that she believes it is causing nausea and all of her \"other issues.\" She is tangential and accusatory. Provider stayed with her to provide reassurance and calming.     Once calm, Mehreen was able to answer questions. She denies CP, SOB, cough, fever. She states her pain is mostly still located in the right groin and she sometimes gets traveling pain across the pelvis and down her legs. She denies headaches, but states that she had a dizzy spell yesterday (this corresponds w/ a BP swing). She denies constipation/diarrhea. She has a good appetite. She misses her \"bed from home.\" She is paranoid and hypervigilant about her medications, without knowing what they are, and this appears to be stressing her out. We note that she has been on BID Robaxin since admission.    Past Medical and Surgical History reviewed in Epic today.  MEDICATIONS:  Current Outpatient Medications   Medication Sig Dispense Refill     acetaminophen (TYLENOL) 500 MG tablet Take 2 tablets (1,000 mg) by mouth 3 times daily       bisacodyl (DULCOLAX) 10 MG suppository Place 10 mg rectally daily as needed for constipation       calcium carbonate (OS-YADIRA) 500 MG tablet Take 1 tablet (500 mg) by mouth 2 " times daily       calcium carbonate (TUMS) 500 MG chewable tablet Take 1 chew tab by mouth daily as needed (indigestion)       carbidopa-levodopa (SINEMET)  MG tablet Take 1 tablet by mouth 3 times daily 180 tablet 11     clotrimazole (LOTRIMIN) 1 % external cream Apply topically 2 times daily as needed       diclofenac (VOLTAREN) 1 % topical gel Apply 2 g topically 4 times daily for 2 days       DULoxetine (CYMBALTA) 20 MG capsule Take 60 mg by mouth daily       gabapentin (NEURONTIN) 300 MG capsule Take 2 capsules (600 mg) by mouth 2 times daily       gabapentin (NEURONTIN) 400 MG capsule Take 2 capsules (800 mg) by mouth At Bedtime       hydrALAZINE (APRESOLINE) 10 MG tablet Take 10 mg by mouth 3 times daily as needed for high blood pressure For SBP >170       hydrochlorothiazide (HYDRODIURIL) 25 MG tablet TAKE 1 TABLET BY MOUTH EVERY DAY DX HYPERTENSION AND LE EDEMA 31 tablet 11     levothyroxine (SYNTHROID/LEVOTHROID) 100 MCG tablet Take 1 tablet (100 mcg) by mouth daily       Lidocaine (LIDOCARE) 4 % Patch Place 2-3 patches onto the skin every 24 hours Apply to right hip and low back for pain  To prevent lidocaine toxicity, patient should be patch free for 12 hrs daily.       losartan (COZAAR) 50 MG tablet TAKE 1 TABLET BY MOUTH EVERY MORNING 31 tablet 11     melatonin 3 MG tablet Take 1 tablet (3 mg) by mouth every 24 hours       menthol (ICY HOT) 5 % PTCH Apply 1 patch topically every 8 hours as needed for muscle soreness       methocarbamol (ROBAXIN) 500 MG tablet Take 1 tablet (500 mg) by mouth 2 times daily       oxyCODONE (ROXICODONE) 5 MG tablet Take 1 tablet (5 mg) by mouth every 4 hours as needed for severe pain 30 tablet 0     polyethylene glycol (MIRALAX) 17 GM/Dose powder Take 17 g by mouth daily 510 g      polyethylene glycol-propylene glycol (SYSTANE ULTRA) 0.4-0.3 % SOLN ophthalmic solution Place 2 drops into both eyes 3 times daily (and additionally as needed/requested)       QUEtiapine  "(SEROQUEL) 25 MG tablet Take 1 tablet (25 mg) by mouth At Bedtime       QUEtiapine (SEROQUEL) 25 MG tablet Take 0.5 tablets (12.5 mg) by mouth 3 times daily as needed (agitation)       senna-docusate (SENOKOT-S/PERICOLACE) 8.6-50 MG tablet Take 1-2 tablets by mouth 2 times daily       simethicone (MYLICON) 125 MG chewable tablet Take 125 mg by mouth 3 times daily as needed       vitamin D3 (CHOLECALCIFEROL) 50 mcg (2000 units) tablet Take 1 tablet (50 mcg) by mouth daily       VITAMIN D3 25 MCG (1000 UT) tablet TAKE 2 TABLETS (2000IU) BY MOUTH EVERY DAY DX OSTEOPOROSIS 60 tablet 11     REVIEW OF SYSTEMS: Limited secondary to cognitive impairment but today pt reports the above and 4 point ROS including Respiratory, CV, GI and , other than that noted in the HPI, is negative.    Objective:  BP 95/68   Pulse 77   Temp 97.5  F (36.4  C)   Resp 18   Ht 1.676 m (5' 6\")   Wt 71.5 kg (157 lb 9.6 oz)   SpO2 97%   BMI 25.44 kg/m    Exam:  GENERAL APPEARANCE: Alert, in no distress, cooperative.   ENT: Mouth/posterior oropharynx intact w/ moist mucous membranes, hearing acuity [place].  EYES: EOM, conjunctivae, lids, pupils and irises normal, PERRL2.   RESP: Respiratory effort good, no respiratory distress, Lung sounds clear. On RA.   CV: Auscultation of heart reveals S1, S2, rate and rhythm regular, no audible murmur, no rub or gallop, Edema 0+ BLE. Peripheral pulses are 2+.  ABDOMEN: Normal bowel sounds, soft, non-tender abdomen, and no masses palpated.  SKIN: Inspection/Palpation of skin and subcutaneous tissue baseline w/ fragility. No wounds/rashes noted.   NEURO: CN II-XII at patient's baseline, sensation baseline PPS.  PSYCH: Insight, judgement, and memory are baseline impaired, affect and mood are anxious/paranoid/cyclical.    Labs:   Labs done in SNF are in [place] EPIC. Please refer to them using [facility]/Care Everywhere.    ASSESSMENT/PLAN:  Multiple closed fractures of pelvis without disruption of pelvic ring with " routine healing, subsequent encounter  Fracture of multiple pubic rami with routine healing, right  Multiple system atrophy P (H)  Paranoia (H)  Lewy body dementia without behavioral disturbance (H)  Chronic bilateral low back pain with bilateral sciatica  Slow transit constipation  Orthostatic hypotension dysautonomic syndrome (H)  Primary osteoarthritis involving multiple joints  Acute-on-chronic. Ongoing.    We will partner with Mehreen, as her finding out the use of Robaxin appears to have triggered her paranoia and is causing behavioral disturbance. We will reduce as noted below, with plan to discontinue at next visit if she is tolerating this reduction.    When Mehreen is experiencing these sxs of paranoia and associated distress, we highly advise nursing utilize the PRN Seroquel, with the goal for Mehreen to be calm and able to verbalize her needs without distress.     We note that she is walking almost at baseline, and despite her pelvic fractures is physically rehabilitating well. We note that she is at-risk for future falls/injuries given her MSA, dementia, and orthostasis. We recommend Memory Care [facility] or long-term care for optimal safety as she progresses.  Follow-up w/in 1 week or as needed.    Orders:  1. Decrease Robaxin to 500mg PO at bedtime, and once every day PRN. Dx: muscle spasms.    Electronically signed by:  HAILEE Gilbert CNP DNP            Sincerely,        HAILEE Langley CNP

## 2021-02-18 RX ORDER — METHOCARBAMOL 500 MG/1
500 TABLET, FILM COATED ORAL 2 TIMES DAILY PRN
COMMUNITY
Start: 2021-02-26 | End: 2021-02-27

## 2021-02-19 NOTE — PROGRESS NOTES
"[facility]IATRIC SERV[facility]  Episodic/Acute/Follow-Up  [place] MRN: [id number]. Place of Service where encounter took place:  [facility] ([facility]) [[id number]]  Chief Complaint   Patient presents with     RECHECK   HPI: Mehreen Camara  is a 81 year old (1940), who is being seen today for an episodic care visit.  HPI information obtained from: facility chart records, facility staff, patient report, [facility] chart review and Care Everywhere [facility] chart review. Today's concern is:    Mehreen had been doing well, but today she is full of anxiety and is worried about her muscle relaxer. She is so fixated on this that she believes it is causing nausea and all of her \"other issues.\" She is tangential and accusatory. Provider stayed with her to provide reassurance and calming.     Once calm, Mehreen was able to answer questions. She denies CP, SOB, cough, fever. She states her pain is mostly still located in the right groin and she sometimes gets traveling pain across the pelvis and down her legs. She denies headaches, but states that she had a dizzy spell yesterday (this corresponds w/ a BP swing). She denies constipation/diarrhea. She has a good appetite. She misses her \"bed from home.\" She is paranoid and hypervigilant about her medications, without knowing what they are, and this appears to be stressing her out. We note that she has been on BID Robaxin since admission.    Past Medical and Surgical History reviewed in Epic today.  MEDICATIONS:  Current Outpatient Medications   Medication Sig Dispense Refill     acetaminophen (TYLENOL) 500 MG tablet Take 2 tablets (1,000 mg) by mouth 3 times daily       bisacodyl (DULCOLAX) 10 MG suppository Place 10 mg rectally daily as needed for constipation       calcium carbonate (OS-YADIRA) 500 MG tablet Take 1 tablet (500 mg) by mouth 2 times daily       calcium carbonate (TUMS) 500 MG chewable tablet Take 1 chew tab by mouth daily as needed (indigestion)   "     carbidopa-levodopa (SINEMET)  MG tablet Take 1 tablet by mouth 3 times daily 180 tablet 11     clotrimazole (LOTRIMIN) 1 % external cream Apply topically 2 times daily as needed       diclofenac (VOLTAREN) 1 % topical gel Apply 2 g topically 4 times daily for 2 days       DULoxetine (CYMBALTA) 20 MG capsule Take 60 mg by mouth daily       gabapentin (NEURONTIN) 300 MG capsule Take 2 capsules (600 mg) by mouth 2 times daily       gabapentin (NEURONTIN) 400 MG capsule Take 2 capsules (800 mg) by mouth At Bedtime       hydrALAZINE (APRESOLINE) 10 MG tablet Take 10 mg by mouth 3 times daily as needed for high blood pressure For SBP >170       hydrochlorothiazide (HYDRODIURIL) 25 MG tablet TAKE 1 TABLET BY MOUTH EVERY DAY DX HYPERTENSION AND LE EDEMA 31 tablet 11     levothyroxine (SYNTHROID/LEVOTHROID) 100 MCG tablet Take 1 tablet (100 mcg) by mouth daily       Lidocaine (LIDOCARE) 4 % Patch Place 2-3 patches onto the skin every 24 hours Apply to right hip and low back for pain  To prevent lidocaine toxicity, patient should be patch free for 12 hrs daily.       losartan (COZAAR) 50 MG tablet TAKE 1 TABLET BY MOUTH EVERY MORNING 31 tablet 11     melatonin 3 MG tablet Take 1 tablet (3 mg) by mouth every 24 hours       menthol (ICY HOT) 5 % PTCH Apply 1 patch topically every 8 hours as needed for muscle soreness       methocarbamol (ROBAXIN) 500 MG tablet Take 1 tablet (500 mg) by mouth 2 times daily       oxyCODONE (ROXICODONE) 5 MG tablet Take 1 tablet (5 mg) by mouth every 4 hours as needed for severe pain 30 tablet 0     polyethylene glycol (MIRALAX) 17 GM/Dose powder Take 17 g by mouth daily 510 g      polyethylene glycol-propylene glycol (SYSTANE ULTRA) 0.4-0.3 % SOLN ophthalmic solution Place 2 drops into both eyes 3 times daily (and additionally as needed/requested)       QUEtiapine (SEROQUEL) 25 MG tablet Take 1 tablet (25 mg) by mouth At Bedtime       QUEtiapine (SEROQUEL) 25 MG tablet Take 0.5 tablets  "(12.5 mg) by mouth 3 times daily as needed (agitation)       senna-docusate (SENOKOT-S/PERICOLACE) 8.6-50 MG tablet Take 1-2 tablets by mouth 2 times daily       simethicone (MYLICON) 125 MG chewable tablet Take 125 mg by mouth 3 times daily as needed       vitamin D3 (CHOLECALCIFEROL) 50 mcg (2000 units) tablet Take 1 tablet (50 mcg) by mouth daily       VITAMIN D3 25 MCG (1000 UT) tablet TAKE 2 TABLETS (2000IU) BY MOUTH EVERY DAY DX OSTEOPOROSIS 60 tablet 11     REVIEW OF SYSTEMS: Limited secondary to cognitive impairment but today pt reports the above and 4 point ROS including Respiratory, CV, GI and , other than that noted in the HPI, is negative.    Objective:  BP 95/68   Pulse 77   Temp 97.5  F (36.4  C)   Resp 18   Ht 1.676 m (5' 6\")   Wt 71.5 kg (157 lb 9.6 oz)   SpO2 97%   BMI 25.44 kg/m    Exam:  GENERAL APPEARANCE: Alert, in no distress, cooperative.   ENT: Mouth/posterior oropharynx intact w/ moist mucous membranes, hearing acuity [place].  EYES: EOM, conjunctivae, lids, pupils and irises normal, PERRL2.   RESP: Respiratory effort good, no respiratory distress, Lung sounds clear. On RA.   CV: Auscultation of heart reveals S1, S2, rate and rhythm regular, no audible murmur, no rub or gallop, Edema 0+ BLE. Peripheral pulses are 2+.  ABDOMEN: Normal bowel sounds, soft, non-tender abdomen, and no masses palpated.  SKIN: Inspection/Palpation of skin and subcutaneous tissue baseline w/ fragility. No wounds/rashes noted.   NEURO: CN II-XII at patient's baseline, sensation baseline PPS.  PSYCH: Insight, judgement, and memory are baseline impaired, affect and mood are anxious/paranoid/cyclical.    Labs:   Labs done in SNF are in [place] EPIC. Please refer to them using [employer]/Care Everywhere.    ASSESSMENT/PLAN:  Multiple closed fractures of pelvis without disruption of pelvic ring with routine healing, subsequent encounter  Fracture of multiple pubic rami with routine healing, right  Multiple system atrophy " P (H)  Paranoia (H)  Lewy body dementia without behavioral disturbance (H)  Chronic bilateral low back pain with bilateral sciatica  Slow transit constipation  Orthostatic hypotension dysautonomic syndrome (H)  Primary osteoarthritis involving multiple joints  Acute-on-chronic. Ongoing.    We will partner with Mehreen, as her finding out the use of Robaxin appears to have triggered her paranoia and is causing behavioral disturbance. We will reduce as noted below, with plan to discontinue at next visit if she is tolerating this reduction.    When Mehreen is experiencing these sxs of paranoia and associated distress, we highly advise nursing utilize the PRN Seroquel, with the goal for Mehreen to be calm and able to verbalize her needs without distress.     We note that she is walking almost at baseline, and despite her pelvic fractures is physically rehabilitating well. We note that she is at-risk for future falls/injuries given her MSA, dementia, and orthostasis. We recommend Memory Care [facility] or long-term care for optimal safety as she progresses.  Follow-up w/in 1 week or as needed.    Orders:  1. Decrease Robaxin to 500mg PO at bedtime, and once every day PRN. Dx: muscle spasms.    Electronically signed by:  Dr. Lisa Parry, APRN CNP DNP

## 2021-02-24 DIAGNOSIS — S32.82XD MULTIPLE CLOSED FRACTURES OF PELVIS WITHOUT DISRUPTION OF PELVIC RING WITH ROUTINE HEALING, SUBSEQUENT ENCOUNTER: ICD-10-CM

## 2021-02-24 RX ORDER — OXYCODONE HYDROCHLORIDE 5 MG/1
5 TABLET ORAL EVERY 4 HOURS PRN
Qty: 30 TABLET | Refills: 0 | Status: SHIPPED | OUTPATIENT
Start: 2021-02-24 | End: 2021-03-06

## 2021-02-26 ENCOUNTER — NURSING HOME VISIT (OUTPATIENT)
Dept: GERIATRICS | Facility: CLINIC | Age: 81
End: 2021-02-26
Payer: COMMERCIAL

## 2021-02-26 DIAGNOSIS — S32.591D FRACTURE OF MULTIPLE PUBIC RAMI WITH ROUTINE HEALING, RIGHT: ICD-10-CM

## 2021-02-26 DIAGNOSIS — M54.42 CHRONIC BILATERAL LOW BACK PAIN WITH BILATERAL SCIATICA: ICD-10-CM

## 2021-02-26 DIAGNOSIS — I95.1 ORTHOSTATIC HYPOTENSION DYSAUTONOMIC SYNDROME: ICD-10-CM

## 2021-02-26 DIAGNOSIS — M54.41 CHRONIC BILATERAL LOW BACK PAIN WITH BILATERAL SCIATICA: ICD-10-CM

## 2021-02-26 DIAGNOSIS — Z79.899 POLYPHARMACY: ICD-10-CM

## 2021-02-26 DIAGNOSIS — G31.83 LEWY BODY DEMENTIA WITHOUT BEHAVIORAL DISTURBANCE (H): ICD-10-CM

## 2021-02-26 DIAGNOSIS — S32.82XA MULTIPLE CLOSED FRACTURES OF PELVIS WITHOUT DISRUPTION OF PELVIC RING, INITIAL ENCOUNTER (H): ICD-10-CM

## 2021-02-26 DIAGNOSIS — M15.0 PRIMARY OSTEOARTHRITIS INVOLVING MULTIPLE JOINTS: ICD-10-CM

## 2021-02-26 DIAGNOSIS — F02.80 LEWY BODY DEMENTIA WITHOUT BEHAVIORAL DISTURBANCE (H): ICD-10-CM

## 2021-02-26 DIAGNOSIS — G23.2 MULTIPLE SYSTEM ATROPHY P (H): ICD-10-CM

## 2021-02-26 DIAGNOSIS — G89.29 CHRONIC BILATERAL LOW BACK PAIN WITH BILATERAL SCIATICA: ICD-10-CM

## 2021-02-26 DIAGNOSIS — K59.01 SLOW TRANSIT CONSTIPATION: ICD-10-CM

## 2021-02-26 DIAGNOSIS — F22 PARANOIA (H): ICD-10-CM

## 2021-02-26 DIAGNOSIS — S32.82XD MULTIPLE CLOSED FRACTURES OF PELVIS WITHOUT DISRUPTION OF PELVIC RING WITH ROUTINE HEALING, SUBSEQUENT ENCOUNTER: Primary | ICD-10-CM

## 2021-02-26 PROCEDURE — 99309 SBSQ NF CARE MODERATE MDM 30: CPT | Performed by: NURSE PRACTITIONER

## 2021-02-26 ASSESSMENT — MIFFLIN-ST. JEOR: SCORE: 1207.96

## 2021-02-26 NOTE — PROGRESS NOTES
"[facility] GERIATRIC SERVICE  Episodic/Acute/Follow-Up  [place] MRN: [id number]. Place of Service where encounter took place:  [facility] ([facility]) [[id number]]  Chief Complaint   Patient presents with     RECHECK   HPI: Mehreen Camara  is a 81 year old (1940), who is being seen today for an episodic care visit.  HPI information obtained from: facility chart records, facility staff, patient report, [facility] chart review and Care Everywhere [facility] chart review. Today's concern is:    Last week, Mehreen had an upsetting day and we started a reduction on her Robaxin. Today, Mehreen denies, headache, nausea, heartburn, CP, SOB, cough, fever. She states that she has a good appetite and has been sleeping \"ok\". She states her bowels are \"fine\" today. Chart review shows VS are stable w/ her usual BP fluctuations.    Past Medical and Surgical History reviewed in Epic today.  MEDICATIONS:  Current Outpatient Medications   Medication Sig Dispense Refill     methocarbamol (ROBAXIN) 500 MG tablet Take 1 tablet (500 mg) by mouth 2 times daily as needed for muscle spasms       acetaminophen (TYLENOL) 500 MG tablet Take 2 tablets (1,000 mg) by mouth 3 times daily       carbidopa-levodopa (SINEMET)  MG tablet Take 1 tablet by mouth 3 times daily 180 tablet 11     diclofenac (VOLTAREN) 1 % topical gel Apply 2 g topically 4 times daily for 2 days       DULoxetine (CYMBALTA) 20 MG capsule Take 60 mg by mouth daily       gabapentin (NEURONTIN) 300 MG capsule Take 2 capsules (600 mg) by mouth 2 times daily       gabapentin (NEURONTIN) 400 MG capsule Take 2 capsules (800 mg) by mouth At Bedtime       hydrALAZINE (APRESOLINE) 10 MG tablet Take 10 mg by mouth 3 times daily as needed for high blood pressure For SBP >170       hydrochlorothiazide (HYDRODIURIL) 25 MG tablet TAKE 1 TABLET BY MOUTH EVERY DAY DX HYPERTENSION AND LE EDEMA 31 tablet 11     levothyroxine (SYNTHROID/LEVOTHROID) 100 MCG tablet Take 1 " "tablet (100 mcg) by mouth daily       Lidocaine (LIDOCARE) 4 % Patch Place 2-3 patches onto the skin every 24 hours Apply to right hip and low back for pain  To prevent lidocaine toxicity, patient should be patch free for 12 hrs daily.       losartan (COZAAR) 50 MG tablet TAKE 1 TABLET BY MOUTH EVERY MORNING 31 tablet 11     melatonin 3 MG tablet Take 1 tablet (3 mg) by mouth every 24 hours       menthol (ICY HOT) 5 % PTCH Apply 1 patch topically every 8 hours as needed for muscle soreness       oxyCODONE (ROXICODONE) 5 MG tablet Take 1 tablet (5 mg) by mouth every 4 hours as needed for severe pain 30 tablet 0     polyethylene glycol (MIRALAX) 17 GM/Dose powder Take 17 g by mouth daily 510 g      polyethylene glycol-propylene glycol (SYSTANE ULTRA) 0.4-0.3 % SOLN ophthalmic solution Place 2 drops into both eyes 3 times daily (and additionally as needed/requested)       QUEtiapine (SEROQUEL) 25 MG tablet Take 1 tablet (25 mg) by mouth At Bedtime       QUEtiapine (SEROQUEL) 25 MG tablet Take 0.5 tablets (12.5 mg) by mouth 3 times daily as needed (agitation)       senna-docusate (SENOKOT-S/PERICOLACE) 8.6-50 MG tablet Take 1-2 tablets by mouth 2 times daily       simethicone (MYLICON) 125 MG chewable tablet Take 125 mg by mouth 3 times daily as needed       vitamin D3 (CHOLECALCIFEROL) 50 mcg (2000 units) tablet Take 1 tablet (50 mcg) by mouth daily       REVIEW OF SYSTEMS: Limited secondary to cognitive impairment but today pt reports the above and 4 point ROS including Respiratory, CV, GI and , other than that noted in the HPI, is negative.    Objective:  BP (!) 180/112   Pulse 80   Temp 98  F (36.7  C)   Resp 16   Ht 1.676 m (5' 6\")   Wt 72.6 kg (160 lb 1.6 oz)   SpO2 98%   BMI 25.84 kg/m    Exam:  GENERAL APPEARANCE: Alert, in no distress, cooperative.   ENT: Mouth/posterior oropharynx intact w/ moist mucous membranes, hearing acuity [place].  EYES: EOM, conjunctivae, lids, pupils and irises normal, PERRL2. "   RESP: Respiratory effort good, no respiratory distress, Lung sounds clear. On RA.   CV: Auscultation of heart reveals S1, S2, rate and rhythm regular, no audible murmur, no rub or gallop, Edema 0+ BLE. Peripheral pulses are 2+.  ABDOMEN: Normal bowel sounds, soft, non-tender abdomen, and no masses palpated.  SKIN: Inspection/Palpation of skin and subcutaneous tissue baseline w/ fragility. No wounds/rashes noted.   NEURO: CN II-XII at patient's baseline, sensation baseline PPS.  PSYCH: Insight, judgement, and memory are baseline impaired, affect and mood are anxious/paranoid/cyclical.    Labs:   Labs done in SNF are in [place] EPIC. Please refer to them using [employer]/Care Everywhere.    ASSESSMENT/PLAN:  Multiple closed fractures of pelvis without disruption of pelvic ring with routine healing, subsequent encounter  Fracture of multiple pubic rami with routine healing, right  Multiple system atrophy P (H)  Paranoia (H)  Lewy body dementia without behavioral disturbance (H)  Chronic bilateral low back pain with bilateral sciatica  Slow transit constipation  Orthostatic hypotension dysautonomic syndrome (H)  Primary osteoarthritis involving multiple joints  Polypharmacy  Acute-on-chronic. Ongoing.    Mehreen thinks she doesn't need Robaxin and would like to continue reductions. Nursing is noting that sometimes she has increased pain. We will reduce to PRN for now and see if PRN is used prior to fully discontinuing.     Mehreen's living situation has dramatically changed, and she has been moved out of her [facility] apartment. She, family, and  are working on placing her in a new situation. We highly recommend memory care [facility] or LTC.     Given her situation change, we will discontinue PRN medications that she had on-hand in her apartment and utilize BEHZAD for symptom management to reduce polypharmacy.   Follow-up w/in 1 week or as needed.    Orders:   1. Decrease Robaxin to 500mg PO BID PRN. Dx: muscle spasms.  2.  Discontinue PRN Tums (use BEHZAD).   3. Discontinue PRN Bisacodyl (use BEHZAD).   4. Discontinue Clotrimazole cream.    Electronically signed by:  Dr. Lisa Parry, APRN CNP DNP

## 2021-02-26 NOTE — LETTER
"    2/26/2021        RE: Mehreen Camara  [facility]  [address]  [address]  [facility] [address]        [facility] GERIATRIC SERVICE  Episodic/Acute/Follow-Up  [place] MRN: [id number]. Place of Service where encounter took place:  [facility] ([facility]) [[id number]]  Chief Complaint   Patient presents with     RECHECK   HPI: Mehreen Camara  is a 81 year old (1940), who is being seen today for an episodic care visit.  HPI information obtained from: facility chart records, facility staff, patient report, [facility] chart review and Care Everywhere [facility] chart review. Today's concern is:    Last week, Mehreen had an upsetting day and we started a reduction on her Robaxin. Today, Mehreen denies, headache, nausea, heartburn, CP, SOB, cough, fever. She states that she has a good appetite and has been sleeping \"ok\". She states her bowels are \"fine\" today. Chart review shows VS are stable w/ her usual BP fluctuations.    Past Medical and Surgical History reviewed in Epic today.  MEDICATIONS:  Current Outpatient Medications   Medication Sig Dispense Refill     methocarbamol (ROBAXIN) 500 MG tablet Take 1 tablet (500 mg) by mouth 2 times daily as needed for muscle spasms       acetaminophen (TYLENOL) 500 MG tablet Take 2 tablets (1,000 mg) by mouth 3 times daily       carbidopa-levodopa (SINEMET)  MG tablet Take 1 tablet by mouth 3 times daily 180 tablet 11     diclofenac (VOLTAREN) 1 % topical gel Apply 2 g topically 4 times daily for 2 days       DULoxetine (CYMBALTA) 20 MG capsule Take 60 mg by mouth daily       gabapentin (NEURONTIN) 300 MG capsule Take 2 capsules (600 mg) by mouth 2 times daily       gabapentin (NEURONTIN) 400 MG capsule Take 2 capsules (800 mg) by mouth At Bedtime       hydrALAZINE (APRESOLINE) 10 MG tablet Take 10 mg by mouth 3 times daily as needed for high blood pressure For SBP >170       hydrochlorothiazide (HYDRODIURIL) 25 MG tablet TAKE 1 TABLET BY MOUTH " "EVERY DAY DX HYPERTENSION AND LE EDEMA 31 tablet 11     levothyroxine (SYNTHROID/LEVOTHROID) 100 MCG tablet Take 1 tablet (100 mcg) by mouth daily       Lidocaine (LIDOCARE) 4 % Patch Place 2-3 patches onto the skin every 24 hours Apply to right hip and low back for pain  To prevent lidocaine toxicity, patient should be patch free for 12 hrs daily.       losartan (COZAAR) 50 MG tablet TAKE 1 TABLET BY MOUTH EVERY MORNING 31 tablet 11     melatonin 3 MG tablet Take 1 tablet (3 mg) by mouth every 24 hours       menthol (ICY HOT) 5 % PTCH Apply 1 patch topically every 8 hours as needed for muscle soreness       oxyCODONE (ROXICODONE) 5 MG tablet Take 1 tablet (5 mg) by mouth every 4 hours as needed for severe pain 30 tablet 0     polyethylene glycol (MIRALAX) 17 GM/Dose powder Take 17 g by mouth daily 510 g      polyethylene glycol-propylene glycol (SYSTANE ULTRA) 0.4-0.3 % SOLN ophthalmic solution Place 2 drops into both eyes 3 times daily (and additionally as needed/requested)       QUEtiapine (SEROQUEL) 25 MG tablet Take 1 tablet (25 mg) by mouth At Bedtime       QUEtiapine (SEROQUEL) 25 MG tablet Take 0.5 tablets (12.5 mg) by mouth 3 times daily as needed (agitation)       senna-docusate (SENOKOT-S/PERICOLACE) 8.6-50 MG tablet Take 1-2 tablets by mouth 2 times daily       simethicone (MYLICON) 125 MG chewable tablet Take 125 mg by mouth 3 times daily as needed       vitamin D3 (CHOLECALCIFEROL) 50 mcg (2000 units) tablet Take 1 tablet (50 mcg) by mouth daily       REVIEW OF SYSTEMS: Limited secondary to cognitive impairment but today pt reports the above and 4 point ROS including Respiratory, CV, GI and , other than that noted in the HPI, is negative.    Objective:  BP (!) 180/112   Pulse 80   Temp 98  F (36.7  C)   Resp 16   Ht 1.676 m (5' 6\")   Wt 72.6 kg (160 lb 1.6 oz)   SpO2 98%   BMI 25.84 kg/m    Exam:  GENERAL APPEARANCE: Alert, in no distress, cooperative.   ENT: Mouth/posterior oropharynx intact " w/ moist mucous membranes, hearing acuity [place].  EYES: EOM, conjunctivae, lids, pupils and irises normal, PERRL2.   RESP: Respiratory effort good, no respiratory distress, Lung sounds clear. On RA.   CV: Auscultation of heart reveals S1, S2, rate and rhythm regular, no audible murmur, no rub or gallop, Edema 0+ BLE. Peripheral pulses are 2+.  ABDOMEN: Normal bowel sounds, soft, non-tender abdomen, and no masses palpated.  SKIN: Inspection/Palpation of skin and subcutaneous tissue baseline w/ fragility. No wounds/rashes noted.   NEURO: CN II-XII at patient's baseline, sensation baseline PPS.  PSYCH: Insight, judgement, and memory are baseline impaired, affect and mood are anxious/paranoid/cyclical.    Labs:   Labs done in SNF are in [place] EPIC. Please refer to them using [employer]/Care Everywhere.    ASSESSMENT/PLAN:  Multiple closed fractures of pelvis without disruption of pelvic ring with routine healing, subsequent encounter  Fracture of multiple pubic rami with routine healing, right  Multiple system atrophy P (H)  Paranoia (H)  Lewy body dementia without behavioral disturbance (H)  Chronic bilateral low back pain with bilateral sciatica  Slow transit constipation  Orthostatic hypotension dysautonomic syndrome (H)  Primary osteoarthritis involving multiple joints  Polypharmacy  Acute-on-chronic. Ongoing.    Mehreen thinks she doesn't need Robaxin and would like to continue reductions. Nursing is noting that sometimes she has increased pain. We will reduce to PRN for now and see if PRN is used prior to fully discontinuing.     Mehreen's living situation has dramatically changed, and she has been moved out of her [facility] apartment. She, family, and  are working on placing her in a new situation. We highly recommend memory care [facility] or [facility].     Given her situation change, we will discontinue PRN medications that she had on-hand in her apartment and utilize BEHZAD for symptom management to reduce polypharmacy.    Follow-up w/in 1 week or as needed.    Orders:   1. Decrease Robaxin to 500mg PO BID PRN. Dx: muscle spasms.  2. Discontinue PRN Tums (use BEHZAD).   3. Discontinue PRN Bisacodyl (use BEHZAD).   4. Discontinue Clotrimazole cream.    Electronically signed by:  Dr. Lisa Parry, HAILEE CNP DNP          Sincerely,        HAILEE Langley CNP

## 2021-02-27 VITALS
TEMPERATURE: 98 F | WEIGHT: 160.1 LBS | SYSTOLIC BLOOD PRESSURE: 165 MMHG | HEART RATE: 80 BPM | BODY MASS INDEX: 25.73 KG/M2 | DIASTOLIC BLOOD PRESSURE: 97 MMHG | RESPIRATION RATE: 16 BRPM | OXYGEN SATURATION: 98 % | HEIGHT: 66 IN

## 2021-02-27 RX ORDER — METHOCARBAMOL 500 MG/1
500 TABLET, FILM COATED ORAL 2 TIMES DAILY PRN
Start: 2021-02-27 | End: 2021-03-05

## 2021-03-02 ENCOUNTER — TELEPHONE (OUTPATIENT)
Dept: ORTHOPEDICS | Facility: CLINIC | Age: 81
End: 2021-03-02

## 2021-03-02 ENCOUNTER — TRANSFERRED RECORDS (OUTPATIENT)
Dept: HEALTH INFORMATION MANAGEMENT | Facility: CLINIC | Age: 81
End: 2021-03-02

## 2021-03-02 NOTE — TELEPHONE ENCOUNTER
[facility] Call Center    Phone Message    May a detailed message be left on voicemail: yes     Reason for Call: Other: Patient's daughter is requesting the appt w/Dr. Rosario on 03/17 be a telephone visit. Her mother is in a care facility and shouldn't leave. If not ok please call daughter back      Action Taken: Message routed to:  [facility] ([facility]    Travel Screening: Not Applicable

## 2021-03-03 NOTE — PROGRESS NOTES
"[facility] GERIATRIC SERVICE  Episodic/Acute/Follow-Up  [place] MRN: [id number]. Place of Service where encounter took place:  [facility] ([facility]) [4000]   Chief Complaint   Patient presents with     RECHECK   HPI: Mehreen Camara  is a 81 year old (1940), who is being seen today for an episodic care visit.  HPI information obtained from: facility chart records, facility staff, patient report, [facility] chart review and Care Everywhere [facility] chart review. Today's concern is:    Mehreen seen today on routine follow-up after we made several medication changes last week, including reducing her muscle spasm medication to just as needed.  She has since transferred to a different room in the facility, and will require a private room given her underlying condition and paranoia.  Today, she is seen resting in her bed comfortably and says that her pain is about the same.  She denies numbness or tingling, she denies trouble with her bowels.  She states that she is eating \"okay\", and denies nausea.Chart review shows BP stable w/ only 2 episodes of HTN this week. PRN Hydralazine available. Otherwise VSS.     Past Medical and Surgical History reviewed in Epic today.  MEDICATIONS:  Current Outpatient Medications   Medication Sig Dispense Refill     acetaminophen (TYLENOL) 500 MG tablet Take 2 tablets (1,000 mg) by mouth 3 times daily       carbidopa-levodopa (SINEMET)  MG tablet Take 1 tablet by mouth 3 times daily 180 tablet 11     diclofenac (VOLTAREN) 1 % topical gel Apply 2 g topically 4 times daily for 2 days       DULoxetine (CYMBALTA) 20 MG capsule Take 60 mg by mouth daily       gabapentin (NEURONTIN) 300 MG capsule Take 2 capsules (600 mg) by mouth 2 times daily       gabapentin (NEURONTIN) 400 MG capsule Take 2 capsules (800 mg) by mouth At Bedtime       hydrALAZINE (APRESOLINE) 10 MG tablet Take 10 mg by mouth 3 times daily as needed for high blood pressure For SBP >170       " "hydrochlorothiazide (HYDRODIURIL) 25 MG tablet TAKE 1 TABLET BY MOUTH EVERY DAY DX HYPERTENSION AND LE EDEMA 31 tablet 11     levothyroxine (SYNTHROID/LEVOTHROID) 100 MCG tablet Take 1 tablet (100 mcg) by mouth daily       losartan (COZAAR) 50 MG tablet TAKE 1 TABLET BY MOUTH EVERY MORNING 31 tablet 11     menthol (ICY HOT) 5 % PTCH Apply 1 patch topically every 8 hours as needed for muscle soreness       oxyCODONE (ROXICODONE) 5 MG tablet Take 1 tablet (5 mg) by mouth every 4 hours as needed for severe pain 30 tablet 0     polyethylene glycol (MIRALAX) 17 GM/Dose powder Take 17 g by mouth daily 510 g      polyethylene glycol-propylene glycol (SYSTANE ULTRA) 0.4-0.3 % SOLN ophthalmic solution Place 2 drops into both eyes 3 times daily (and additionally as needed/requested)       QUEtiapine (SEROQUEL) 25 MG tablet Take 1 tablet (25 mg) by mouth At Bedtime       QUEtiapine (SEROQUEL) 25 MG tablet Take 0.5 tablets (12.5 mg) by mouth 3 times daily as needed (agitation)       senna-docusate (SENOKOT-S/PERICOLACE) 8.6-50 MG tablet Take 1-2 tablets by mouth 2 times daily       simethicone (MYLICON) 125 MG chewable tablet Take 125 mg by mouth 3 times daily as needed       vitamin D3 (CHOLECALCIFEROL) 50 mcg (2000 units) tablet Take 1 tablet (50 mcg) by mouth daily       REVIEW OF SYSTEMS: Limited secondary to cognitive impairment but today pt reports the above and 4 point ROS including Respiratory, CV, GI and , other than that noted in the HPI, is negative.    Objective: BP (!) 177/96   Pulse 70   Temp 97  F (36.1  C)   Resp 16   Ht 1.676 m (5' 6\")   Wt 72.1 kg (159 lb)   SpO2 95%   BMI 25.66 kg/m    Exam:  GENERAL APPEARANCE: Alert, in no distress, cooperative.   ENT: Mouth/posterior oropharynx intact w/ moist mucous membranes, hearing acuity [place].  EYES: EOM, conjunctivae, lids, pupils and irises normal, PERRL2.   RESP: Respiratory effort unlabored, no respiratory distress, Lung sounds clear. On RA.   CV: " Auscultation of heart reveals S1, S2, rate and rhythm regular, no murmur, no rub or gallop, Edema 0+ BLE. Peripheral pulses are 2+.  ABDOMEN: Normal bowel sounds, soft, non-tender abdomen, and no masses palpated.  SKIN: Inspection/Palpation of skin and subcutaneous tissue baseline w/ fragility. No wounds/rashes noted.   NEURO: CN II-XII at patient's baseline, sensation baseline PPS.  PSYCH: Insight, judgement, and memory are baseline, affect and mood are happy/engaged.    Labs: Labs done in facility are in EPIC. Please refer to them using [employer]/Care Everywhere.    ASSESSMENT/PLAN:  Multiple closed fractures of pelvis without disruption of pelvic ring with routine healing, subsequent encounter  Fracture of multiple pubic rami with routine healing, right  Multiple system atrophy P (H)  Lewy body dementia without behavioral disturbance (H)  Paranoia (H)  Chronic bilateral low back pain with bilateral sciatica  Slow transit constipation  Orthostatic hypotension dysautonomic syndrome (H)  Acute-on-chronic. Ongoing.    Mehreen has moved back to a different room at the facility, she and her family are working on placement in a long-term care, or jail elsewhere.  She will stay in this room until arrangements are made.  Given she is in 24-hour nursing care now,    She is supervised much longer than before and her signs and symptoms can be treated off of standing house orders.  She is doing well without her lidocaine patch, which is no longer covered.  We will stop her melatonin, as she has been sleeping well.    As promised and negotiated last week we will discontinue her as needed Robaxin.  We will renew her as needed Seroquel.  We note that her paranoia and underlying dementia may exacerbate secondary to her relocation to a different spot in the facility.  Nursing does use this sparingly. Noted PRN orders for antipsychotic medications are limited to 14 days. Face to Face encounter done today. Evaluation of medication  indicates non-pharmacological interventions are not effective.  Follow-up w/in 1 week or as needed.    Orders:  1. Discontinue Lidocaine patch.  2. Discontinue Melatonin.  3. Discontinue PRN Robaxin.  4. RENEW PRN Seroquel x 14 days. Dx: paranromarioa.     Electronically signed by:  Dr. Lisa Parry, APRN CNP DNP

## 2021-03-04 ENCOUNTER — NURSING HOME VISIT (OUTPATIENT)
Dept: GERIATRICS | Facility: CLINIC | Age: 81
End: 2021-03-04
Payer: COMMERCIAL

## 2021-03-04 VITALS
OXYGEN SATURATION: 95 % | DIASTOLIC BLOOD PRESSURE: 96 MMHG | HEIGHT: 66 IN | RESPIRATION RATE: 16 BRPM | BODY MASS INDEX: 25.55 KG/M2 | WEIGHT: 159 LBS | HEART RATE: 70 BPM | TEMPERATURE: 97 F | SYSTOLIC BLOOD PRESSURE: 177 MMHG

## 2021-03-04 DIAGNOSIS — M54.41 CHRONIC BILATERAL LOW BACK PAIN WITH BILATERAL SCIATICA: ICD-10-CM

## 2021-03-04 DIAGNOSIS — I95.1 ORTHOSTATIC HYPOTENSION DYSAUTONOMIC SYNDROME: ICD-10-CM

## 2021-03-04 DIAGNOSIS — S32.82XD MULTIPLE CLOSED FRACTURES OF PELVIS WITHOUT DISRUPTION OF PELVIC RING WITH ROUTINE HEALING, SUBSEQUENT ENCOUNTER: Primary | ICD-10-CM

## 2021-03-04 DIAGNOSIS — G31.83 LEWY BODY DEMENTIA WITHOUT BEHAVIORAL DISTURBANCE (H): ICD-10-CM

## 2021-03-04 DIAGNOSIS — S32.591D FRACTURE OF MULTIPLE PUBIC RAMI WITH ROUTINE HEALING, RIGHT: ICD-10-CM

## 2021-03-04 DIAGNOSIS — F22 PARANOIA (H): ICD-10-CM

## 2021-03-04 DIAGNOSIS — K59.01 SLOW TRANSIT CONSTIPATION: ICD-10-CM

## 2021-03-04 DIAGNOSIS — G89.29 CHRONIC BILATERAL LOW BACK PAIN WITH BILATERAL SCIATICA: ICD-10-CM

## 2021-03-04 DIAGNOSIS — G23.2 MULTIPLE SYSTEM ATROPHY P (H): ICD-10-CM

## 2021-03-04 DIAGNOSIS — M54.42 CHRONIC BILATERAL LOW BACK PAIN WITH BILATERAL SCIATICA: ICD-10-CM

## 2021-03-04 DIAGNOSIS — F02.80 LEWY BODY DEMENTIA WITHOUT BEHAVIORAL DISTURBANCE (H): ICD-10-CM

## 2021-03-04 PROCEDURE — 99309 SBSQ NF CARE MODERATE MDM 30: CPT | Performed by: NURSE PRACTITIONER

## 2021-03-04 ASSESSMENT — MIFFLIN-ST. JEOR: SCORE: 1202.97

## 2021-03-04 NOTE — LETTER
"    3/4/2021        RE: Mehreen Camara  [facility]  [address]  [address]  [place] [address]        [facility] [place] [facility]  Episodic/Acute/Follow-Up  [place] MRN: [id number]. Place of Service where encounter took place:  [facility] ([facility]) [4000]   Chief Complaint   Patient presents with     RECHECK   HPI: Mehreen Camara  is a 81 year old (1940), who is being seen today for an episodic care visit.  HPI information obtained from: facility chart records, facility staff, patient report, [facility] chart review and Care Everywhere [facility] chart review. Today's concern is:    Mehreen seen today on routine follow-up after we made several medication changes last week, including reducing her muscle spasm medication to just as needed.  She has since transferred to a different room in the facility, and will require a private room given her underlying condition and paranoia.  Today, she is seen resting in her bed comfortably and says that her pain is about the same.  She denies numbness or tingling, she denies trouble with her bowels.  She states that she is eating \"okay\", and denies nausea.Chart review shows BP stable w/ only 2 episodes of HTN this week. PRN Hydralazine available. Otherwise VSS.     Past Medical and Surgical History reviewed in Epic today.  MEDICATIONS:  Current Outpatient Medications   Medication Sig Dispense Refill     acetaminophen (TYLENOL) 500 MG tablet Take 2 tablets (1,000 mg) by mouth 3 times daily       carbidopa-levodopa (SINEMET)  MG tablet Take 1 tablet by mouth 3 times daily 180 tablet 11     diclofenac (VOLTAREN) 1 % topical gel Apply 2 g topically 4 times daily for 2 days       DULoxetine (CYMBALTA) 20 MG capsule Take 60 mg by mouth daily       gabapentin (NEURONTIN) 300 MG capsule Take 2 capsules (600 mg) by mouth 2 times daily       gabapentin (NEURONTIN) 400 MG capsule Take 2 capsules (800 mg) by mouth At Bedtime       hydrALAZINE " "(APRESOLINE) 10 MG tablet Take 10 mg by mouth 3 times daily as needed for high blood pressure For SBP >170       hydrochlorothiazide (HYDRODIURIL) 25 MG tablet TAKE 1 TABLET BY MOUTH EVERY DAY DX HYPERTENSION AND LE EDEMA 31 tablet 11     levothyroxine (SYNTHROID/LEVOTHROID) 100 MCG tablet Take 1 tablet (100 mcg) by mouth daily       losartan (COZAAR) 50 MG tablet TAKE 1 TABLET BY MOUTH EVERY MORNING 31 tablet 11     menthol (ICY HOT) 5 % PTCH Apply 1 patch topically every 8 hours as needed for muscle soreness       oxyCODONE (ROXICODONE) 5 MG tablet Take 1 tablet (5 mg) by mouth every 4 hours as needed for severe pain 30 tablet 0     polyethylene glycol (MIRALAX) 17 GM/Dose powder Take 17 g by mouth daily 510 g      polyethylene glycol-propylene glycol (SYSTANE ULTRA) 0.4-0.3 % SOLN ophthalmic solution Place 2 drops into both eyes 3 times daily (and additionally as needed/requested)       QUEtiapine (SEROQUEL) 25 MG tablet Take 1 tablet (25 mg) by mouth At Bedtime       QUEtiapine (SEROQUEL) 25 MG tablet Take 0.5 tablets (12.5 mg) by mouth 3 times daily as needed (agitation)       senna-docusate (SENOKOT-S/PERICOLACE) 8.6-50 MG tablet Take 1-2 tablets by mouth 2 times daily       simethicone (MYLICON) 125 MG chewable tablet Take 125 mg by mouth 3 times daily as needed       vitamin D3 (CHOLECALCIFEROL) 50 mcg (2000 units) tablet Take 1 tablet (50 mcg) by mouth daily       REVIEW OF SYSTEMS: Limited secondary to cognitive impairment but today pt reports the above and 4 point ROS including Respiratory, CV, GI and , other than that noted in the HPI, is negative.    Objective: BP (!) 177/96   Pulse 70   Temp 97  F (36.1  C)   Resp 16   Ht 1.676 m (5' 6\")   Wt 72.1 kg (159 lb)   SpO2 95%   BMI 25.66 kg/m    Exam:  GENERAL APPEARANCE: Alert, in no distress, cooperative.   ENT: Mouth/posterior oropharynx intact w/ moist mucous membranes, hearing acuity [place].  EYES: EOM, conjunctivae, lids, pupils and irises " normal, PERRL2.   RESP: Respiratory effort unlabored, no respiratory distress, Lung sounds clear. On RA.   CV: Auscultation of heart reveals S1, S2, rate and rhythm regular, no murmur, no rub or gallop, Edema 0+ BLE. Peripheral pulses are 2+.  ABDOMEN: Normal bowel sounds, soft, non-tender abdomen, and no masses palpated.  SKIN: Inspection/Palpation of skin and subcutaneous tissue baseline w/ fragility. No wounds/rashes noted.   NEURO: CN II-XII at patient's baseline, sensation baseline PPS.  PSYCH: Insight, judgement, and memory are baseline, affect and mood are happy/engaged.    Labs: Labs done in facility are in EPIC. Please refer to them using [employer] Everywhere.    ASSESSMENT/PLAN:  Multiple closed fractures of pelvis without disruption of pelvic ring with routine healing, subsequent encounter  Fracture of multiple pubic rami with routine healing, right  Multiple system atrophy P (H)  Lewy body dementia without behavioral disturbance (H)  Paranoia (H)  Chronic bilateral low back pain with bilateral sciatica  Slow transit constipation  Orthostatic hypotension dysautonomic syndrome (H)  Acute-on-chronic. Ongoing.    Mehreen has moved back to a different room at the facility, she and her family are working on placement in a long-term care, or senior care elsewhere.  She will stay in this room until arrangements are made.  Given she is in 24-hour nursing care now,    She is supervised much longer than before and her signs and symptoms can be treated off of standing house orders.  She is doing well without her lidocaine patch, which is no longer covered.  We will stop her melatonin, as she has been sleeping well.    As promised and negotiated last week we will discontinue her as needed Robaxin.  We will renew her as needed Seroquel.  We note that her paranoia and underlying dementia may exacerbate secondary to her relocation to a different spot in the facility.  Nursing does use this sparingly. Noted PRN orders for  antipsychotic medications are limited to 14 days. Face to Face encounter done today. Evaluation of medication indicates non-pharmacological interventions are not effective.  Follow-up w/in 1 week or as needed.    Orders:  1. Discontinue Lidocaine patch.  2. Discontinue Melatonin.  3. Discontinue PRN Robaxin.  4. RENEW PRN Seroquel x 14 days. Dx: paranoia.     Electronically signed by:  Dr. Lisa Parry, HAILEE CNP DNP        Sincerely,        HAILEE Langley CNP

## 2021-03-05 NOTE — TELEPHONE ENCOUNTER
Called the patient's daughter (Eladio) back and asked if patient was able to complete her xray. She states that xray has been completed and it has been sent to us. I confirmed that xray was in our system. I told her that it was ok doing a telephone visit like last time (conference call). I changed the appointment type and verified the time. The patient's daughter states that patient is now in a different room and her current phone number is [phone]. Daughter would like to be on the call. Her number is [phone].

## 2021-03-06 DIAGNOSIS — S32.82XD MULTIPLE CLOSED FRACTURES OF PELVIS WITHOUT DISRUPTION OF PELVIC RING WITH ROUTINE HEALING, SUBSEQUENT ENCOUNTER: ICD-10-CM

## 2021-03-06 RX ORDER — OXYCODONE HYDROCHLORIDE 5 MG/1
TABLET ORAL
Qty: 30 TABLET | Refills: 0 | Status: SHIPPED | OUTPATIENT
Start: 2021-03-06 | End: 2021-03-19

## 2021-03-06 RX ORDER — OXYCODONE HYDROCHLORIDE 5 MG/1
5 TABLET ORAL EVERY 4 HOURS PRN
Qty: 30 TABLET | Refills: 0 | Status: SHIPPED | OUTPATIENT
Start: 2021-03-06 | End: 2021-03-16

## 2021-03-12 NOTE — PROGRESS NOTES
"[place] [facility]   Regulatory Video Visit    Mehreen Camara is being evaluated via a billable video visit.   The patient has been notified of following: \"This video visit will be conducted via a call between you and your provider. We have found that certain health care needs can be provided without the need for an in-person physical exam.  This service lets us provide the care you need with a video conversation. If during the course of the call the provider feels a video visit is not appropriate, you will not be charged for this service.\"   The provider has received verbal consent for a Video Visit from the patient or first contact? Yes  Patient or facility staff would like the video invitation sent by: N/A   Video Start Time: 12:28    [place] Medical Record Number: [id number]  Place of Location at the time of visit: [facility]  Chief Complaint   Patient presents with     Video Visit     Nursing Home Regulatory     HPI:  Mehreen Camara is a 81 year old (1940), who is being seen today for a Regulatory visit. HPI information obtained from: facility chart records, facility staff, patient report, [facility] chart review and family/first contact Eladio (daughter) report.     HISTORY  12/23/21 through 12/30/21:Pt with PMH notable for multiple system atrophy, and  chronic pain syndrome, had a fall resulted in sacral ala and right pubic ramus fx, and small pelvic hematoma, managed conservatively.       Updates:  2/9: Seroquel prn renewed, gabapentin increased to 600 mg bid, and 800 mg HS, simethicone started  2/17: robaxin reduced to 500 mg HS, and once daily  2/26: Robaxin reduced to 500 mg bid, Tums and bisacodyl prn stopped.   3/4: Lidoderm patch and melatonin, and robaxin prn discontinued. Seroquel prn continued for another 14 days.         Today's concern is:  - Pt seen in the presence of RN who graciously assisted with the virtual visit  - Behaviors:  - Pain: pt reports pain is  In my " lower back, and gets hurt with movements, better with meds.   - Abd pain: gets worse if become more constipated, dull aching over lower right part,   - Rehab: completed rehab, walks with walker all over the [address].   - Constipation: pt reports having loose stool, now I have diarrhea,  Before coming here, used to have it prn. Daughter reports used to be on it prn and was doing well, my mother had IBS, had to end up in the ED for constipation.   - Appetite: better now.   - Discharge: Eladio reports working on purchasing an adjustable bed, and move to Memory care unit at an [facility].     ================================  Past Medical and Surgical History reviewed in Epic today.      MEDICATIONS:  Current Outpatient Medications   Medication Sig Dispense Refill     polyethylene glycol (MIRALAX) 17 GM/Dose powder Take 17 g by mouth 2 times daily as needed for constipation 510 g      acetaminophen (TYLENOL) 500 MG tablet Take 2 tablets (1,000 mg) by mouth 3 times daily       carbidopa-levodopa (SINEMET)  MG tablet Take 1 tablet by mouth 3 times daily 180 tablet 11     diclofenac (VOLTAREN) 1 % topical gel Apply 2 g topically 4 times daily for 2 days       DULoxetine (CYMBALTA) 20 MG capsule Take 60 mg by mouth daily       gabapentin (NEURONTIN) 300 MG capsule Take 2 capsules (600 mg) by mouth 2 times daily       gabapentin (NEURONTIN) 400 MG capsule Take 2 capsules (800 mg) by mouth At Bedtime       hydrALAZINE (APRESOLINE) 10 MG tablet Take 10 mg by mouth 3 times daily as needed for high blood pressure For SBP >170       hydrochlorothiazide (HYDRODIURIL) 25 MG tablet TAKE 1 TABLET BY MOUTH EVERY DAY DX HYPERTENSION AND LE EDEMA 31 tablet 11     levothyroxine (SYNTHROID/LEVOTHROID) 100 MCG tablet Take 1 tablet (100 mcg) by mouth daily       losartan (COZAAR) 50 MG tablet TAKE 1 TABLET BY MOUTH EVERY MORNING 31 tablet 11     menthol (ICY HOT) 5 % PTCH Apply 1 patch topically every 8 hours as needed for muscle soreness    "    oxyCODONE (ROXICODONE) 5 MG tablet TAKE 1 TABLET BY MOUTH EVERY 4 HOURS AS NEEDED FOR SEVERE PAIN 30 tablet 0     oxyCODONE (ROXICODONE) 5 MG tablet Take 1 tablet (5 mg) by mouth every 4 hours as needed for severe pain 30 tablet 0     polyethylene glycol (MIRALAX) 17 GM/Dose powder Take 17 g by mouth daily 510 g      polyethylene glycol-propylene glycol (SYSTANE ULTRA) 0.4-0.3 % SOLN ophthalmic solution Place 2 drops into both eyes 3 times daily (and additionally as needed/requested)       QUEtiapine (SEROQUEL) 25 MG tablet Take 1 tablet (25 mg) by mouth At Bedtime       QUEtiapine (SEROQUEL) 25 MG tablet Take 0.5 tablets (12.5 mg) by mouth 3 times daily as needed (agitation)       senna-docusate (SENOKOT-S/PERICOLACE) 8.6-50 MG tablet Take 1-2 tablets by mouth 2 times daily       simethicone (MYLICON) 125 MG chewable tablet Take 125 mg by mouth 3 times daily as needed       vitamin D3 (CHOLECALCIFEROL) 50 mcg (2000 units) tablet Take 1 tablet (50 mcg) by mouth daily       REVIEW OF SYSTEMS: 4 point ROS including Respiratory, CV, GI and , other than that noted in the HPI,  is negative    Objective: BP (!) 177/96   Pulse 70   Temp 97.3  F (36.3  C)   Resp 16   Ht 1.676 m (5' 6\")   Wt 72.8 kg (160 lb 6.4 oz)   SpO2 96%   BMI 25.89 kg/m    Limited visit exam done given COVID-19 precautions.   GENERAL APPEARANCE:  in no distress  RESP:  unlabored breathing  M/S:   no joint deformity noted  SKIN:  no rash noted  NEURO:   no purposeful movement in upper and lower extremities  PSYCH:  affect and mood normal    Labs: Reviewed in the chart and EHR.        ASSESSMENT/PLAN:  -------------------------------  Closed nondisplaced zone I fracture of sacrum with routine healing, subsequent encounter  Fracture of multiple pubic rami with routine healing, right  L4 compression acute fx (Oct 2020)  Scoliosis  Primary OA involving multiple joints.   Chronic bilateral low back pain with bilateral sciatica  Frequent " falls:  Moderated spinal canal stenosis  Hx of Achillis Tendon Rupture  Chronic Pain syndrome  - analgesia optimal.   - completed rehab, improved, ambulates independently using walker.       IBS-C: chronic, with few ED visits,  was followed by GI, was on miralax bid prn plus scheduled. After lengthy discussion with pt and daughter Debbie, will start miralax bid prn.       Dementia due to MSA (H)  Query delusion and Paranoia (H)  VAMSHI   Episode of recurrent major depressive disorder, unspecified depression episode severity (H)  REM sleep behavior , night Terror  Adjustment disorder  - on Seroquel, Neurontin, and Cymbalta, and sinemet.   - has neuropsychiatric eval (Oct 2020), and was noted to have pronounced deficit in executive function, attention, learning, and recall memory.       Multiple system atrophy- P (H):  on sinemet. Followed by neurology        Essential HTN:   Hx of severe Autonomic Dysfunction  - elevated SBP. On HCTZ 25 mg,  Losartan 50 mg, and now hydralazine 10 mg tid prn SBP > 170        Hypothyroidism:  - No results found for: TSH  - on LT4. Consider checking TSH on outpatient basis.       Order: See above, otherwise, continue the rest of the current POC.       Electronically signed by:  Ignacia Reyes MD    Video-Visit Details  Type of service:  Video Visit  Video End Time (time video stopped):  12:45  Distant Location (provider location):  [place] [facility]

## 2021-03-14 VITALS
TEMPERATURE: 97.3 F | SYSTOLIC BLOOD PRESSURE: 177 MMHG | HEART RATE: 70 BPM | DIASTOLIC BLOOD PRESSURE: 96 MMHG | BODY MASS INDEX: 25.78 KG/M2 | WEIGHT: 160.4 LBS | OXYGEN SATURATION: 96 % | HEIGHT: 66 IN | RESPIRATION RATE: 16 BRPM

## 2021-03-14 ASSESSMENT — MIFFLIN-ST. JEOR: SCORE: 1209.32

## 2021-03-15 ENCOUNTER — VIRTUAL VISIT (OUTPATIENT)
Dept: GERIATRICS | Facility: CLINIC | Age: 81
End: 2021-03-15
Payer: COMMERCIAL

## 2021-03-15 DIAGNOSIS — F22 PARANOIA (H): ICD-10-CM

## 2021-03-15 DIAGNOSIS — K58.1 IRRITABLE BOWEL SYNDROME WITH CONSTIPATION: Primary | ICD-10-CM

## 2021-03-15 DIAGNOSIS — I10 ESSENTIAL HYPERTENSION: ICD-10-CM

## 2021-03-15 DIAGNOSIS — S32.82XD MULTIPLE CLOSED FRACTURES OF PELVIS WITHOUT DISRUPTION OF PELVIC RING WITH ROUTINE HEALING, SUBSEQUENT ENCOUNTER: ICD-10-CM

## 2021-03-15 DIAGNOSIS — S32.591D FRACTURE OF MULTIPLE PUBIC RAMI WITH ROUTINE HEALING, RIGHT: ICD-10-CM

## 2021-03-15 DIAGNOSIS — G23.2 MULTIPLE SYSTEM ATROPHY P (H): ICD-10-CM

## 2021-03-15 DIAGNOSIS — F02.818 DEMENTIA DUE TO MEDICAL CONDITION WITH BEHAVIORAL DISTURBANCE (H): ICD-10-CM

## 2021-03-15 PROCEDURE — 99207 PR CDG-MDM COMPONENT: MEETS MODERATE - DOWN CODED: CPT | Performed by: FAMILY MEDICINE

## 2021-03-15 PROCEDURE — 99309 SBSQ NF CARE MODERATE MDM 30: CPT | Mod: 95 | Performed by: FAMILY MEDICINE

## 2021-03-15 RX ORDER — POLYETHYLENE GLYCOL 3350 17 G/17G
17 POWDER, FOR SOLUTION ORAL 2 TIMES DAILY PRN
Qty: 510 G
Start: 2021-03-15 | End: 2022-05-16

## 2021-03-15 NOTE — LETTER
"    3/15/2021        RE: Mehreen Camara  [facility]  [address]  [address]  [place] [address]        [facility]   Regulatory Video Visit    Mehreen Camara is being evaluated via a billable video visit.   The patient has been notified of following: \"This video visit will be conducted via a call between you and your provider. We have found that certain health care needs can be provided without the need for an in-person physical exam.  This service lets us provide the care you need with a video conversation. If during the course of the call the provider feels a video visit is not appropriate, you will not be charged for this service.\"   The provider has received verbal consent for a Video Visit from the patient or first contact? Yes  Patient or facility staff would like the video invitation sent by: N/A   Video Start Time: 12:28    [place] Medical Record Number: [id number]  Place of Location at the time of visit: [facility]  Chief Complaint   Patient presents with     Video Visit     Nursing Home Regulatory     HPI:  Mehreen Camara is a 81 year old (1940), who is being seen today for a Regulatory visit. HPI information obtained from: facility chart records, facility staff, patient report, [facility] chart review and family/first contact Eladio (daughter) report.     HISTORY  12/23/21 through 12/30/21:Pt with PMH notable for multiple system atrophy, and  chronic pain syndrome, had a fall resulted in sacral ala and right pubic ramus fx, and small pelvic hematoma, managed conservatively.       Updates:  2/9: Seroquel prn renewed, gabapentin increased to 600 mg bid, and 800 mg HS, simethicone started  2/17: robaxin reduced to 500 mg HS, and once daily  2/26: Robaxin reduced to 500 mg bid, Tums and bisacodyl prn stopped.   3/4: Lidoderm patch and melatonin, and robaxin prn discontinued. Seroquel prn continued for another 14 days.         Today's concern is:  - Pt seen " in the presence of RN who graciously assisted with the virtual visit  - Behaviors:  - Pain: pt reports pain is  In my lower back, and gets hurt with movements, better with meds.   - Abd pain: gets worse if become more constipated, dull aching over lower right part,   - Rehab: completed rehab, walks with walker all over the [address].   - Constipation: pt reports having loose stool, now I have diarrhea,  Before coming here, used to have it prn. Daughter reports used to be on it prn and was doing well, my mother had IBS, had to end up in the ED for constipation.   - Appetite: better now.   - Discharge: Eladio reports working on purchasing an adjustable bed, and move to Memory care unit at an [facility].     ================================  Past Medical and Surgical History reviewed in Epic today.      MEDICATIONS:  Current Outpatient Medications   Medication Sig Dispense Refill     polyethylene glycol (MIRALAX) 17 GM/Dose powder Take 17 g by mouth 2 times daily as needed for constipation 510 g      acetaminophen (TYLENOL) 500 MG tablet Take 2 tablets (1,000 mg) by mouth 3 times daily       carbidopa-levodopa (SINEMET)  MG tablet Take 1 tablet by mouth 3 times daily 180 tablet 11     diclofenac (VOLTAREN) 1 % topical gel Apply 2 g topically 4 times daily for 2 days       DULoxetine (CYMBALTA) 20 MG capsule Take 60 mg by mouth daily       gabapentin (NEURONTIN) 300 MG capsule Take 2 capsules (600 mg) by mouth 2 times daily       gabapentin (NEURONTIN) 400 MG capsule Take 2 capsules (800 mg) by mouth At Bedtime       hydrALAZINE (APRESOLINE) 10 MG tablet Take 10 mg by mouth 3 times daily as needed for high blood pressure For SBP >170       hydrochlorothiazide (HYDRODIURIL) 25 MG tablet TAKE 1 TABLET BY MOUTH EVERY DAY DX HYPERTENSION AND LE EDEMA 31 tablet 11     levothyroxine (SYNTHROID/LEVOTHROID) 100 MCG tablet Take 1 tablet (100 mcg) by mouth daily       losartan (COZAAR) 50 MG tablet TAKE 1 TABLET BY MOUTH EVERY  "MORNING 31 tablet 11     menthol (ICY HOT) 5 % PTCH Apply 1 patch topically every 8 hours as needed for muscle soreness       oxyCODONE (ROXICODONE) 5 MG tablet TAKE 1 TABLET BY MOUTH EVERY 4 HOURS AS NEEDED FOR SEVERE PAIN 30 tablet 0     oxyCODONE (ROXICODONE) 5 MG tablet Take 1 tablet (5 mg) by mouth every 4 hours as needed for severe pain 30 tablet 0     polyethylene glycol (MIRALAX) 17 GM/Dose powder Take 17 g by mouth daily 510 g      polyethylene glycol-propylene glycol (SYSTANE ULTRA) 0.4-0.3 % SOLN ophthalmic solution Place 2 drops into both eyes 3 times daily (and additionally as needed/requested)       QUEtiapine (SEROQUEL) 25 MG tablet Take 1 tablet (25 mg) by mouth At Bedtime       QUEtiapine (SEROQUEL) 25 MG tablet Take 0.5 tablets (12.5 mg) by mouth 3 times daily as needed (agitation)       senna-docusate (SENOKOT-S/PERICOLACE) 8.6-50 MG tablet Take 1-2 tablets by mouth 2 times daily       simethicone (MYLICON) 125 MG chewable tablet Take 125 mg by mouth 3 times daily as needed       vitamin D3 (CHOLECALCIFEROL) 50 mcg (2000 units) tablet Take 1 tablet (50 mcg) by mouth daily       REVIEW OF SYSTEMS: 4 point ROS including Respiratory, CV, GI and , other than that noted in the HPI,  is negative    Objective: BP (!) 177/96   Pulse 70   Temp 97.3  F (36.3  C)   Resp 16   Ht 1.676 m (5' 6\")   Wt 72.8 kg (160 lb 6.4 oz)   SpO2 96%   BMI 25.89 kg/m    Limited visit exam done given COVID-19 precautions.   GENERAL APPEARANCE:  in no distress  RESP:  unlabored breathing  M/S:   no joint deformity noted  SKIN:  no rash noted  NEURO:   no purposeful movement in upper and lower extremities  PSYCH:  affect and mood normal    Labs: Reviewed in the chart and EHR.        ASSESSMENT/PLAN:  -------------------------------  Closed nondisplaced zone I fracture of sacrum with routine healing, subsequent encounter  Fracture of multiple pubic rami with routine healing, right  L4 compression acute fx (Oct " 2020)  Scoliosis  Primary OA involving multiple joints.   Chronic bilateral low back pain with bilateral sciatica  Frequent falls:  Moderated spinal canal stenosis  Hx of Achillis Tendon Rupture  Chronic Pain syndrome  - analgesia optimal.   - completed rehab, improved, ambulates independently using walker.       IBS-C: chronic, with few ED visits,  was followed by GI, was on miralax bid prn plus scheduled. After lengthy discussion with pt and daughter Debbie, will start miralax bid prn.       Dementia due to MSA (H)  Query delusion and Paranoia (H)  VAMSHI   Episode of recurrent major depressive disorder, unspecified depression episode severity (H)  REM sleep behavior , night Terror  Adjustment disorder  - on Seroquel, Neurontin, and Cymbalta, and sinemet.   - has neuropsychiatric eval (Oct 2020), and was noted to have pronounced deficit in executive function, attention, learning, and recall memory.       Multiple system atrophy- P (H):  on sinemet. Followed by neurology        Essential HTN:   Hx of severe Autonomic Dysfunction  - elevated SBP. On HCTZ 25 mg,  Losartan 50 mg, and now hydralazine 10 mg tid prn SBP > 170        Hypothyroidism:  - No results found for: TSH  - on LT4. Consider checking TSH on outpatient basis.       Order: See above, otherwise, continue the rest of the current POC.       Electronically signed by:  Ignacia Reyes MD    Video-Visit Details  Type of service:  Video Visit  Video End Time (time video stopped):  12:45  Distant Location (provider location):  [facility]         Sincerely,        Ignacia Reyes MD

## 2021-03-16 DIAGNOSIS — S32.82XD MULTIPLE CLOSED FRACTURES OF PELVIS WITHOUT DISRUPTION OF PELVIC RING WITH ROUTINE HEALING, SUBSEQUENT ENCOUNTER: ICD-10-CM

## 2021-03-16 RX ORDER — OXYCODONE HYDROCHLORIDE 5 MG/1
5 TABLET ORAL EVERY 4 HOURS PRN
Qty: 30 TABLET | Refills: 0 | Status: SHIPPED | OUTPATIENT
Start: 2021-03-16 | End: 2021-03-24

## 2021-03-16 RX ORDER — OXYCODONE HYDROCHLORIDE 5 MG/1
TABLET ORAL
Qty: 30 TABLET | Refills: 0 | OUTPATIENT
Start: 2021-03-16

## 2021-03-17 ENCOUNTER — NURSE TRIAGE (OUTPATIENT)
Dept: NURSING | Facility: CLINIC | Age: 81
End: 2021-03-17

## 2021-03-17 ENCOUNTER — VIRTUAL VISIT (OUTPATIENT)
Dept: ORTHOPEDICS | Facility: CLINIC | Age: 81
End: 2021-03-17
Payer: COMMERCIAL

## 2021-03-17 ENCOUNTER — TELEPHONE (OUTPATIENT)
Dept: ORTHOPEDICS | Facility: CLINIC | Age: 81
End: 2021-03-17

## 2021-03-17 ENCOUNTER — RECORDS - HEALTHEAST (OUTPATIENT)
Dept: LAB | Facility: CLINIC | Age: 81
End: 2021-03-17

## 2021-03-17 DIAGNOSIS — S32.591D CLOSED BILATERAL FRACTURE OF PUBIC RAMI WITH ROUTINE HEALING, SUBSEQUENT ENCOUNTER: Primary | ICD-10-CM

## 2021-03-17 DIAGNOSIS — S32.592D CLOSED BILATERAL FRACTURE OF PUBIC RAMI WITH ROUTINE HEALING, SUBSEQUENT ENCOUNTER: Primary | ICD-10-CM

## 2021-03-17 LAB
SARS-COV-2 PCR COMMENT: NORMAL
SARS-COV-2 RNA SPEC QL NAA+PROBE: NEGATIVE
SARS-COV-2 VIRUS SPECIMEN SOURCE: NORMAL

## 2021-03-17 PROCEDURE — 99214 OFFICE O/P EST MOD 30 MIN: CPT | Mod: TEL | Performed by: FAMILY MEDICINE

## 2021-03-17 ASSESSMENT — PATIENT HEALTH QUESTIONNAIRE - PHQ9: SUM OF ALL RESPONSES TO PHQ QUESTIONS 1-9: 15

## 2021-03-17 NOTE — NURSING NOTE
Depression Response    Patient completed the PHQ-9 assessment for depression and scored >9? Yes  Question 9 on the PHQ-9 was positive for suicidality? Yes  Does patient have current mental health provider? No    Is this a virtual visit? Yes   Does patient have suicidal ideation (positive question 9)? Yes - transfer to Red Flag Triage ([phone]) Patient transferred.    I personally notified the following: visit provider and triage nurse (virtual visit)

## 2021-03-17 NOTE — TELEPHONE ENCOUNTER
"    Answer Assessment - Initial Assessment Questions  Call from [facility] this morning to speak with patient regarding answers to PHQ-9.    Protocols used: DEPRESSION-A-OH    This nurse called patient at Rehab facility on her room phone.   Patient is in rehab due to recent pelvic injury - 'broke in 4 places\". She will  be there until 4/17/21.     Nurse inquired how patient is feeling? Patient responding, \"feeling okay\".     Nurse informed patient that she was calling to speak with her regarding her response of feeling like she would be better off gone?     \"That is why I did not want to answer this, it is always taken out of context. I am sure people say this all the time and do not mean it... \"  Denies SI/HI at this time.     Patient says that she has problems with sleeping due to bed not being all that good.   Physical therapy is at end of 1st session. Walking has improved and currently ambulating with a walker.     Voiced frustration with the lack of help at facility with ADL's. Physical therapy not well organized.     She has been feeling isolated due to the Covid19 and unable to see her family. Last night was the first time daughter able to visit - due to Covid restrictions being lifted.     Encouraged patient to speak with family regarding her concerns with her needing more assistance.     Patient verbalized an understanding and agreed with plan.     Routed to [facility] to review and follow up with Plan of Care.   "

## 2021-03-17 NOTE — PROGRESS NOTES
Mehreen is a 81 year old who is being evaluated via a billable telephone visit.      What phone number would you like to be contacted at? [phone]  How would you like to obtain your AVS? MyChart    Assessment & Plan     Bilateral closed fracture of pubic rami with routine healing  Patient continues to have some persistent pain and her physicians at the care facility have increased her Neurontin  We continue to try to get the patient's pain under control  She has been walking around the facility without assistance.  Patient's daughter reports is very difficult to slow her down she has always been very active  I think the patient should be walking with a walker and with somebody from the facility if possible  Patient is certainly a fall risk  I believe that her pelvic fractures are showing some evidence of callus but have not completely healed  Daughter is wondering what else could be done to improve her mobility and therefore we have placed an external PT order  - PHYSICAL THERAPY REFERRAL - EXTERNAL; Future      30 minutes spent on the date of the encounter doing chart review, history and exam, documentation and further activities as noted above       Will be moving into unit with more 24 hr care    Return in about 2 months (around 5/17/2021), or if symptoms worsen or fail to improve.    Sara Rosario MD  [facility]   Mehreen is a 81 year old who presents for the following health issues  accompanied by her daughter:    HPI     Pt reports pain with sitting, hurts more sitting.  Walking with walker.  No longer getting PT.  Walking the hallway without any assistance, can't sit long.  Wants to get up and move, hurts to lay down.  She thinks her back is a Zing Morro.  cushion to decrease pain  Vit D and calcium  Neurontin was increased, 2400mg  And has continued to have some persistent pain in the low pelvis    Review of Systems   CONSTITUTIONAL:  NEGATIVE for fever, chills, change in weight  ENT/MOUTH: NEGATIVE for ear, mouth and throat problems  RESP: NEGATIVE for significant cough or SOB  CV: NEGATIVE for chest pain, palpitations or peripheral edema  MUSCULOSKELETAL: pubic rami fractures      Objective           Vitals:  No vitals were obtained today due to virtual visit.    Physical Exam   alert, mild distress and cooperative  PSYCH: Alert and oriented times 3; coherent speech, normal   rate and volume, able to articulate logical thoughts, able   to abstract reason, no tangential thoughts, no hallucinations   or delusions  Her affect is normal  RESP: No cough, no audible wheezing, able to talk in full sentences  Remainder of exam unable to be completed due to telephone visits    Xray - Reviewed and interpreted by me.  Still pubic rami fractures         Sara Rosario MD          Phone call duration: 21 minutes

## 2021-03-17 NOTE — LETTER
3/17/2021       RE: Mehreen Camara  [facility]  [address]  [address]  [place] [address]     Dear Colleague,    Thank you for referring your patient, Mehreen Camara, to the [facility]TS [facility]. Please see a copy of my visit note below.    Mehreen is a 81 year old who is being evaluated via a billable telephone visit.      What phone number would you like to be contacted at? [phone]  How would you like to obtain your AVS? MyChart    Assessment & Plan     Bilateral closed fracture of pubic rami with routine healing  Patient continues to have some persistent pain and her physicians at the care facility have increased her Neurontin  We continue to try to get the patient's pain under control  She has been walking around the facility without assistance.  Patient's daughter reports is very difficult to slow her down she has always been very active  I think the patient should be walking with a walker and with somebody from the facility if possible  Patient is certainly a fall risk  I believe that her pelvic fractures are showing some evidence of callus but have not completely healed  Daughter is wondering what else could be done to improve her mobility and therefore we have placed an external PT order  - PHYSICAL THERAPY REFERRAL - EXTERNAL; Future      30 minutes spent on the date of the encounter doing chart review, history and exam, documentation and further activities as noted above       Will be moving into unit with more 24 hr care    Return in about 2 months (around 5/17/2021), or if symptoms worsen or fail to improve.    Sara Rosario MD  [facility]    Subjective   Mehreen is a 81 year old who presents for the following health issues  accompanied by her daughter:    HPI     Pt reports pain with sitting, hurts more sitting.  Walking with walker.   No longer getting PT.  Walking the hallway without any assistance, can't sit long.  Wants to get up and move, hurts to lay down.  She thinks her back is a Zing Morro.  cushion to decrease pain  Vit D and calcium  Neurontin was increased, 2400mg  And has continued to have some persistent pain in the low pelvis    Review of Systems   CONSTITUTIONAL: NEGATIVE for fever, chills, change in weight  ENT/MOUTH: NEGATIVE for ear, mouth and throat problems  RESP: NEGATIVE for significant cough or SOB  CV: NEGATIVE for chest pain, palpitations or peripheral edema  MUSCULOSKELETAL: pubic rami fractures      Objective           Vitals:  No vitals were obtained today due to virtual visit.    Physical Exam   alert, mild distress and cooperative  PSYCH: Alert and oriented times 3; coherent speech, normal   rate and volume, able to articulate logical thoughts, able   to abstract reason, no tangential thoughts, no hallucinations   or delusions  Her affect is normal  RESP: No cough, no audible wheezing, able to talk in full sentences  Remainder of exam unable to be completed due to telephone visits    Xray - Reviewed and interpreted by me.  Still pubic rami fractures         Sara Rosario MD          Phone call duration: 21 minutes      Again, thank you for allowing me to participate in the care of your patient.      Sincerely,    Sara Rosario MD

## 2021-03-17 NOTE — LETTER
3/17/2021      RE: Mehreen Camara  [facility]  [address]  [address]  [place] [facility] [address]       Mehreen is a 81 year old who is being evaluated via a billable telephone visit.      What phone number would you like to be contacted at? [phone]  How would you like to obtain your AVS? MyChart    Assessment & Plan     Bilateral closed fracture of pubic rami with routine healing  Patient continues to have some persistent pain and her physicians at the care facility have increased her Neurontin  We continue to try to get the patient's pain under control  She has been walking around the facility without assistance.  Patient's daughter reports is very difficult to slow her down she has always been very active  I think the patient should be walking with a walker and with somebody from the facility if possible  Patient is certainly a fall risk  I believe that her pelvic fractures are showing some evidence of callus but have not completely healed  Daughter is wondering what else could be done to improve her mobility and therefore we have placed an external PT order  - PHYSICAL THERAPY REFERRAL - EXTERNAL; Future      30 minutes spent on the date of the encounter doing chart review, history and exam, documentation and further activities as noted above       Will be moving into unit with more 24 hr care    Return in about 2 months (around 5/17/2021), or if symptoms worsen or fail to improve.    Sara Rosario MD  [facility]   Mehreen is a 81 year old who presents for the following health issues  accompanied by her daughter:    HPI     Pt reports pain with sitting, hurts more sitting.  Walking with walker.  No longer getting PT.  Walking the hallway without any assistance, can't sit long.  Wants to get up and move, hurts to lay down.  She thinks her back is a Zing Morro.  cushion to decrease pain  Vit D and calcium  Neurontin was increased,  2400mg  And has continued to have some persistent pain in the low pelvis    Review of Systems   CONSTITUTIONAL: NEGATIVE for fever, chills, change in weight  ENT/MOUTH: NEGATIVE for ear, mouth and throat problems  RESP: NEGATIVE for significant cough or SOB  CV: NEGATIVE for chest pain, palpitations or peripheral edema  MUSCULOSKELETAL: pubic rami fractures      Objective           Vitals:  No vitals were obtained today due to virtual visit.    Physical Exam   alert, mild distress and cooperative  PSYCH: Alert and oriented times 3; coherent speech, normal   rate and volume, able to articulate logical thoughts, able   to abstract reason, no tangential thoughts, no hallucinations   or delusions  Her affect is normal  RESP: No cough, no audible wheezing, able to talk in full sentences  Remainder of exam unable to be completed due to telephone visits    Xray - Reviewed and interpreted by me.  Still pubic rami fractures         Sara Rosario MD          Phone call duration: 21 minutes      Sara Rosario MD

## 2021-03-17 NOTE — TELEPHONE ENCOUNTER
Called and asked patient's daughter ([facility]) what's the best way to send physical therapy order. She would like it mailed to her home address. Addressed verified. Referral placed in mail.

## 2021-03-18 ENCOUNTER — NURSING HOME VISIT (OUTPATIENT)
Dept: GERIATRICS | Facility: CLINIC | Age: 81
End: 2021-03-18
Payer: COMMERCIAL

## 2021-03-18 ENCOUNTER — MYC MEDICAL ADVICE (OUTPATIENT)
Dept: NEUROLOGY | Facility: CLINIC | Age: 81
End: 2021-03-18

## 2021-03-18 VITALS
DIASTOLIC BLOOD PRESSURE: 96 MMHG | RESPIRATION RATE: 16 BRPM | OXYGEN SATURATION: 95 % | HEART RATE: 70 BPM | SYSTOLIC BLOOD PRESSURE: 177 MMHG | WEIGHT: 161.2 LBS | HEIGHT: 66 IN | BODY MASS INDEX: 25.91 KG/M2 | TEMPERATURE: 97.2 F

## 2021-03-18 DIAGNOSIS — G31.83 LEWY BODY DEMENTIA WITHOUT BEHAVIORAL DISTURBANCE (H): ICD-10-CM

## 2021-03-18 DIAGNOSIS — K59.01 SLOW TRANSIT CONSTIPATION: ICD-10-CM

## 2021-03-18 DIAGNOSIS — G23.2 MULTIPLE SYSTEM ATROPHY P (H): ICD-10-CM

## 2021-03-18 DIAGNOSIS — F02.818 DEMENTIA DUE TO MEDICAL CONDITION WITH BEHAVIORAL DISTURBANCE (H): ICD-10-CM

## 2021-03-18 DIAGNOSIS — S32.82XD MULTIPLE CLOSED FRACTURES OF PELVIS WITHOUT DISRUPTION OF PELVIC RING WITH ROUTINE HEALING, SUBSEQUENT ENCOUNTER: Primary | ICD-10-CM

## 2021-03-18 DIAGNOSIS — S32.82XD MULTIPLE CLOSED FRACTURES OF PELVIS WITHOUT DISRUPTION OF PELVIC RING WITH ROUTINE HEALING, SUBSEQUENT ENCOUNTER: ICD-10-CM

## 2021-03-18 DIAGNOSIS — F02.80 LEWY BODY DEMENTIA WITHOUT BEHAVIORAL DISTURBANCE (H): ICD-10-CM

## 2021-03-18 DIAGNOSIS — S32.591D FRACTURE OF MULTIPLE PUBIC RAMI WITH ROUTINE HEALING, RIGHT: ICD-10-CM

## 2021-03-18 DIAGNOSIS — F22 PARANOIA (H): ICD-10-CM

## 2021-03-18 DIAGNOSIS — G23.2 MULTIPLE SYSTEM ATROPHY P (H): Primary | ICD-10-CM

## 2021-03-18 DIAGNOSIS — I10 ESSENTIAL HYPERTENSION: ICD-10-CM

## 2021-03-18 PROCEDURE — 99309 SBSQ NF CARE MODERATE MDM 30: CPT | Performed by: NURSE PRACTITIONER

## 2021-03-18 RX ORDER — GABAPENTIN 300 MG/1
300 CAPSULE ORAL 2 TIMES DAILY
COMMUNITY
Start: 2021-03-18 | End: 2023-01-01 | Stop reason: DRUGHIGH

## 2021-03-18 ASSESSMENT — MIFFLIN-ST. JEOR: SCORE: 1212.95

## 2021-03-18 NOTE — PROGRESS NOTES
[facility] GERIATRIC SERVICE  Episodic/Acute/Follow-Up  [place] MRN: [id number]. Place of Service where encounter took place:  No question data found.   Chief Complaint   Patient presents with     RECHECK   HPI: Mehreen Camara  is a 81 year old (1940), who is being seen today for an episodic care visit.  HPI information obtained from: facility chart records, facility staff, patient report, [facility] chart review and Care Everywhere [facility] chart review. Today's concern is:    Mehreen seen today after request made by patient and family to reduce gabapentin.  Gabapentin started sometime ago for neuropathic pain originating in [place] spine, which was exacerbated by her pelvic fractures.  Because she is doing so much better, a reduction may be warranted.    Today, Mehreen states that she still has a lot of pain, but that the medications help.  She is able to perform her ADLs, ambulate for enjoyment, and participate in other activities.  She denies shortness of breath, headache, chest pain, new swelling, but she does note some occasional constipation.  She states that gabapentin makes her sleepy, and she would like to trial a reduction here.  She also states that since her pelvic fractures, she has had trouble with urinary incontinence.  She states that she has not brought this up, probably because she has not felt well enough until now to address it.    Past Medical and Surgical History reviewed in Epic today.  MEDICATIONS:  Current Outpatient Medications   Medication Sig Dispense Refill     gabapentin (NEURONTIN) 300 MG capsule Take 300-600 mg by mouth 3 times daily 300 mg PO QAM and Q noon  mg PO at bedtime       acetaminophen (TYLENOL) 500 MG tablet Take 2 tablets (1,000 mg) by mouth 3 times daily       carbidopa-levodopa (SINEMET)  MG tablet Take 1 tablet by mouth 3 times daily 180 tablet 11     diclofenac (VOLTAREN) 1 % topical gel Apply 2 g topically 4 times daily for 2 days        "DULoxetine (CYMBALTA) 20 MG capsule Take 60 mg by mouth daily       hydrALAZINE (APRESOLINE) 10 MG tablet Take 10 mg by mouth 3 times daily as needed for high blood pressure For SBP >170       hydrochlorothiazide (HYDRODIURIL) 25 MG tablet TAKE 1 TABLET BY MOUTH EVERY DAY DX HYPERTENSION AND LE EDEMA 31 tablet 11     levothyroxine (SYNTHROID/LEVOTHROID) 100 MCG tablet Take 1 tablet (100 mcg) by mouth daily       losartan (COZAAR) 50 MG tablet TAKE 1 TABLET BY MOUTH EVERY MORNING 31 tablet 11     menthol (ICY HOT) 5 % PTCH Apply 1 patch topically every 8 hours as needed for muscle soreness       oxyCODONE (ROXICODONE) 5 MG tablet Take 1 tablet (5 mg) by mouth every 4 hours as needed for severe pain 30 tablet 0     polyethylene glycol (MIRALAX) 17 GM/Dose powder Take 17 g by mouth 2 times daily as needed for constipation 510 g      polyethylene glycol-propylene glycol (SYSTANE ULTRA) 0.4-0.3 % SOLN ophthalmic solution Place 2 drops into both eyes 3 times daily (and additionally as needed/requested)       QUEtiapine (SEROQUEL) 25 MG tablet Take 1 tablet (25 mg) by mouth At Bedtime       QUEtiapine (SEROQUEL) 25 MG tablet Take 0.5 tablets (12.5 mg) by mouth 3 times daily as needed (agitation)       senna-docusate (SENOKOT-S/PERICOLACE) 8.6-50 MG tablet Take 1-2 tablets by mouth 2 times daily       simethicone (MYLICON) 125 MG chewable tablet Take 125 mg by mouth 3 times daily as needed       vitamin D3 (CHOLECALCIFEROL) 50 mcg (2000 units) tablet Take 1 tablet (50 mcg) by mouth daily       REVIEW OF SYSTEMS: Limited secondary to cognitive impairment but today pt reports the above and 4 point ROS including Respiratory, CV, GI and , other than that noted in the HPI, is negative.    Objective: BP (!) 177/96   Pulse 70   Temp 97.2  F (36.2  C)   Resp 16   Ht 1.676 m (5' 6\")   Wt 73.1 kg (161 lb 3.2 oz)   SpO2 95%   BMI 26.02 kg/m    Exam:  GENERAL APPEARANCE: Alert, in no distress, cooperative.   ENT: " Mouth/posterior oropharynx intact w/ moist mucous membranes, hearing acuity [place].  EYES: EOM, conjunctivae, lids, pupils and irises normal, PERRL2. Glasses on.  RESP: Respiratory effort good, no respiratory distress, Lung sounds clear. On RA.   CV: Auscultation of heart reveals S1, S2, rate and rhythm regular, no murmur, no rub or gallop, Edema 0+ BLE. Peripheral pulses are 2+.  ABDOMEN: Normal bowel sounds, soft, non-tender abdomen, and no masses palpated.  SKIN: Inspection/Palpation of skin and subcutaneous tissue baseline w/ fragility. No wounds/rashes noted.   NEURO: CN II-XII at patient's baseline, sensation baseline PPS.  PSYCH: Insight, judgement, and memory are impaired at baseline, affect and mood are happy/engaged/paranoid.    Labs: Labs done in facility are in EPIC. Please refer to them using [employer]/Care Everywhere.    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 Ongoing.    Spoke with Mehreen around pelvic muscles and fractures affecting bladder control.  She would like to pursue physical therapy for this.  Will order a consult here.    Agree that gabapentin can contribute to fatigue during the day.  Also noting importance of controlling Mehreen's pain, and therefore a full discontinuation may not be possible.  We will start with daytime reductions as noted below and follow-up.    Mild HTN occasionally, helped w/ PRN Hydralazine.   Noting paranoid statements during our conversation, Mehreen often makes false accusations, and is often concerned about what she is being given or told by staff.  Will renew her as needed Seroquel. Noted  PRN orders for antipsychotic medications are limited to 14 days. Face to Face encounter done today. Evaluation indicates non-pharmacological interventions are not effective.  Follow-up routinely or as needed.    Orders written by provider at facility and transcribed by : Yamilka Tatum  1. Decrease Gabapentin to:    300 mg po QAM - dx: pain    300 mg PO Qnoon - dx: pain    600 mg PO at bedtime - dx: pain  2. PT eval/tx x 1 - dx: urinary incontinence.  3. RENEW PRN Seroquel x 14 days. Dx: paranoia.     Electronically signed by:  Dr. Lisa Parry, APRN CNP DNP

## 2021-03-18 NOTE — LETTER
3/18/2021        RE: Mehreen Camara  [facility]  [address]  [address]  [facility] [address]        [facility]  Episodic/Acute/Follow-Up  [place] MRN: [id number]. Place of Service where encounter took place:  No question data found.   Chief Complaint   Patient presents with     RECHECK   HPI: Mehreen Camara  is a 81 year old (1940), who is being seen today for an episodic care visit.  HPI information obtained from: facility chart records, facility staff, patient report, [facility] chart review and Care Everywhere [facility] chart review. Today's concern is:    Mehreen seen today after request made by patient and family to reduce gabapentin.  Gabapentin started sometime ago for neuropathic pain originating in [place] spine, which was exacerbated by her pelvic fractures.  Because she is doing so much better, a reduction may be warranted.    Today, Mehreen states that she still has a lot of pain, but that the medications help.  She is able to perform her ADLs, ambulate for enjoyment, and participate in other activities.  She denies shortness of breath, headache, chest pain, new swelling, but she does note some occasional constipation.  She states that gabapentin makes her sleepy, and she would like to trial a reduction here.  She also states that since her pelvic fractures, she has had trouble with urinary incontinence.  She states that she has not brought this up, probably because she has not felt well enough until now to address it.    Past Medical and Surgical History reviewed in Epic today.  MEDICATIONS:  Current Outpatient Medications   Medication Sig Dispense Refill     gabapentin (NEURONTIN) 300 MG capsule Take 300-600 mg by mouth 3 times daily 300 mg PO QAM and Q noon  mg PO at bedtime       acetaminophen (TYLENOL) 500 MG tablet Take 2 tablets (1,000 mg) by mouth 3 times daily       carbidopa-levodopa (SINEMET)  MG tablet Take 1 tablet by mouth 3  "times daily 180 tablet 11     diclofenac (VOLTAREN) 1 % topical gel Apply 2 g topically 4 times daily for 2 days       DULoxetine (CYMBALTA) 20 MG capsule Take 60 mg by mouth daily       hydrALAZINE (APRESOLINE) 10 MG tablet Take 10 mg by mouth 3 times daily as needed for high blood pressure For SBP >170       hydrochlorothiazide (HYDRODIURIL) 25 MG tablet TAKE 1 TABLET BY MOUTH EVERY DAY DX HYPERTENSION AND LE EDEMA 31 tablet 11     levothyroxine (SYNTHROID/LEVOTHROID) 100 MCG tablet Take 1 tablet (100 mcg) by mouth daily       losartan (COZAAR) 50 MG tablet TAKE 1 TABLET BY MOUTH EVERY MORNING 31 tablet 11     menthol (ICY HOT) 5 % PTCH Apply 1 patch topically every 8 hours as needed for muscle soreness       oxyCODONE (ROXICODONE) 5 MG tablet Take 1 tablet (5 mg) by mouth every 4 hours as needed for severe pain 30 tablet 0     polyethylene glycol (MIRALAX) 17 GM/Dose powder Take 17 g by mouth 2 times daily as needed for constipation 510 g      polyethylene glycol-propylene glycol (SYSTANE ULTRA) 0.4-0.3 % SOLN ophthalmic solution Place 2 drops into both eyes 3 times daily (and additionally as needed/requested)       QUEtiapine (SEROQUEL) 25 MG tablet Take 1 tablet (25 mg) by mouth At Bedtime       QUEtiapine (SEROQUEL) 25 MG tablet Take 0.5 tablets (12.5 mg) by mouth 3 times daily as needed (agitation)       senna-docusate (SENOKOT-S/PERICOLACE) 8.6-50 MG tablet Take 1-2 tablets by mouth 2 times daily       simethicone (MYLICON) 125 MG chewable tablet Take 125 mg by mouth 3 times daily as needed       vitamin D3 (CHOLECALCIFEROL) 50 mcg (2000 units) tablet Take 1 tablet (50 mcg) by mouth daily       REVIEW OF SYSTEMS: Limited secondary to cognitive impairment but today pt reports the above and 4 point ROS including Respiratory, CV, GI and , other than that noted in the HPI, is negative.    Objective: BP (!) 177/96   Pulse 70   Temp 97.2  F (36.2  C)   Resp 16   Ht 1.676 m (5' 6\")   Wt 73.1 kg (161 lb 3.2 " oz)   SpO2 95%   BMI 26.02 kg/m    Exam:  GENERAL APPEARANCE: Alert, in no distress, cooperative.   ENT: Mouth/posterior oropharynx intact w/ moist mucous membranes, hearing acuity [place].  EYES: EOM, conjunctivae, lids, pupils and irises normal, PERRL2. Glasses on.  RESP: Respiratory effort good, no respiratory distress, Lung sounds clear. On RA.   CV: Auscultation of heart reveals S1, S2, rate and rhythm regular, no murmur, no rub or gallop, Edema 0+ BLE. Peripheral pulses are 2+.  ABDOMEN: Normal bowel sounds, soft, non-tender abdomen, and no masses palpated.  SKIN: Inspection/Palpation of skin and subcutaneous tissue baseline w/ fragility. No wounds/rashes noted.   NEURO: CN II-XII at patient's baseline, sensation baseline PPS.  PSYCH: Insight, judgement, and memory are impaired at baseline, affect and mood are happy/engaged/paranoid.    Labs: Labs done in facility are in EPIC. Please refer to them using [employer]/Care Everywhere.    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 Ongoing.    Spoke with Mehreen around pelvic muscles and fractures affecting bladder control.  She would like to pursue physical therapy for this.  Will order a consult here.    Agree that gabapentin can contribute to fatigue during the day.  Also noting importance of controlling Mehreen's pain, and therefore a full discontinuation may not be possible.  We will start with daytime reductions as noted below and follow-up.    Mild HTN occasionally, helped w/ PRN Hydralazine.   Noting paranoid statements during our conversation, Mehreen often makes false accusations, and is  often concerned about what she is being given or told by staff.  Will renew her as needed Seroquel. Noted PRN orders for antipsychotic medications are limited to 14 days. Face to Face encounter done today. Evaluation indicates non-pharmacological interventions are not effective.  Follow-up routinely or as needed.    Orders written by provider at facility and transcribed by : Yamilka Tatum  1. Decrease Gabapentin to:    300 mg po QAM - dx: pain    300 mg PO Qnoon - dx: pain    600 mg PO at bedtime - dx: pain  2. PT eval/tx x 1 - dx: urinary incontinence.  3. RENEW PRN Seroquel x 14 days. Dx: paranoia.     Electronically signed by:  Dr. Lisa Parry, HAILEE CNP DNP        Sincerely,        HAILEE Langley CNP

## 2021-03-22 NOTE — TELEPHONE ENCOUNTER
Rx for Adjustable [facility] Hospital Bed was placed in Epic using the diagnoses of MSA and Multiple closed fractures of pelvis without disruption of pelvic ring with routine healing, subsequent encounter.

## 2021-03-24 DIAGNOSIS — S32.82XD MULTIPLE CLOSED FRACTURES OF PELVIS WITHOUT DISRUPTION OF PELVIC RING WITH ROUTINE HEALING, SUBSEQUENT ENCOUNTER: ICD-10-CM

## 2021-03-24 RX ORDER — OXYCODONE HYDROCHLORIDE 5 MG/1
5 TABLET ORAL EVERY 4 HOURS PRN
Qty: 30 TABLET | Refills: 0 | Status: SHIPPED | OUTPATIENT
Start: 2021-03-24 | End: 2021-03-31

## 2021-03-31 ENCOUNTER — RECORDS - HEALTHEAST (OUTPATIENT)
Dept: LAB | Facility: CLINIC | Age: 81
End: 2021-03-31

## 2021-03-31 DIAGNOSIS — S32.82XD MULTIPLE CLOSED FRACTURES OF PELVIS WITHOUT DISRUPTION OF PELVIC RING WITH ROUTINE HEALING, SUBSEQUENT ENCOUNTER: ICD-10-CM

## 2021-03-31 RX ORDER — OXYCODONE HYDROCHLORIDE 5 MG/1
5 TABLET ORAL EVERY 4 HOURS PRN
Qty: 30 TABLET | Refills: 0 | Status: SHIPPED | OUTPATIENT
Start: 2021-03-31 | End: 2021-04-08

## 2021-04-07 NOTE — PROGRESS NOTES
[place] [facility] DISCHARGE SUMMARY  PATIENT'S NAME: Mehreen Camara : 1940 MRN: [id number] Place of Service where encounter took place:  [facility]) [[id number]] PRIMARY CARE PROVIDER AND CLINIC RESPONSIBLE AFTER TRANSFER: [facility].    Transferring providers: HAILEE Gilbert CNP DNP; Ignacia Reyes MD  Recent Hospitalization/ED: [facility] stay 20 to 20.  Date of TCU Admission: 2020  Date of TCU (anticipated) Discharge: 21  Discharged to: [facility] assisted living for patient.  Cognitive Scores: BIMS: 14/15, SLUMS:  and CPT: 3.6/5.6  Physical Function: [place] Balance Assessment: Previous was , but this has slightly improved.  DME: None.   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 She was found to have a right sacral ala and right pubic ramus fractures. She also had small pelvic edema/hemorrhage in the space of [place]. She was treated conservatively and will follow-up w/ ortho in 3 weeks. Mehreen presented to TCU on  s/p hospitalization.     She rehabbed well, and eventually was able to bear weight  and can now walk independently with her walker. Given her baseline cognitive impairment, and her significant fall risk, her [facility] could not accept her back. Her MSA allows for good days and bad days, but it is believed this will continue to progress. Mehreen did not want to stay in TCU or LTC at the facility, and after she plateaued in rehab, she was moved to a room as she waited to find placement elsewhere.     During her stay, she has been comforted by PRN Seroquel as her paranoia sometimes extends to ADL refusal and places her at a safety risk. Pain and bowel management were challenging given the location of fractures for Mehreen, which are still healing. Her blood work has been stable and we were able to reduce polypharmacy slightly. She is followed by neurology, PharmD (both for MSA) and Ortho (arthritis/pain).     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  Discharge Medications:  Current Outpatient Medications   Medication Sig Dispense Refill     acetaminophen (TYLENOL) 500 MG tablet Take 2 tablets (1,000 mg) by mouth 3 times daily       carbidopa-levodopa (SINEMET)  MG tablet Take 1 tablet by mouth 3 times daily 180 tablet 11     diclofenac (VOLTAREN) 1 % topical gel Apply 2 g topically 4 times daily for 2 days       DULoxetine (CYMBALTA) 20 MG capsule Take 60 mg by mouth daily       gabapentin (NEURONTIN) 300 MG capsule Take 300-600 mg by mouth 3 times daily 300 mg PO QAM and Q noon  mg PO at bedtime       hydrALAZINE (APRESOLINE) 10 MG tablet Take 10 mg by mouth 3 times daily as needed for high blood pressure For SBP >170       hydrochlorothiazide (HYDRODIURIL) 25 MG tablet TAKE 1 TABLET BY MOUTH EVERY DAY DX HYPERTENSION AND LE EDEMA 31 tablet 11     levothyroxine (SYNTHROID/LEVOTHROID) 100 MCG tablet Take 1 tablet (100 mcg) by  "mouth daily       losartan (COZAAR) 50 MG tablet TAKE 1 TABLET BY MOUTH EVERY MORNING 31 tablet 11     menthol (ICY HOT) 5 % PTCH Apply 1 patch topically every 8 hours as needed for muscle soreness       oxyCODONE (ROXICODONE) 5 MG tablet Take 1 tablet (5 mg) by mouth every 4 hours as needed for severe pain 30 tablet 0     polyethylene glycol (MIRALAX) 17 GM/Dose powder Take 17 g by mouth 2 times daily as needed for constipation 510 g      polyethylene glycol-propylene glycol (SYSTANE ULTRA) 0.4-0.3 % SOLN ophthalmic solution Place 2 drops into both eyes 3 times daily (and additionally as needed/requested)       QUEtiapine (SEROQUEL) 25 MG tablet Take 1 tablet (25 mg) by mouth At Bedtime       QUEtiapine (SEROQUEL) 25 MG tablet Take 0.5 tablets (12.5 mg) by mouth 3 times daily as needed (agitation)       senna-docusate (SENOKOT-S/PERICOLACE) 8.6-50 MG tablet Take 1-2 tablets by mouth 2 times daily       simethicone (MYLICON) 125 MG chewable tablet Take 125 mg by mouth 3 times daily as needed       vitamin D3 (CHOLECALCIFEROL) 50 mcg (2000 units) tablet Take 1 tablet (50 mcg) by mouth daily       Medication Changes/Rationale:     Many.  Controlled medications sent with patient: Medication: Oxycodone , remaining tabs given to patient at the time of discharge to take home     ROS: Limited secondary to cognitive impairment but today pt reports the above and 4 point ROS including Respiratory, CV, GI and , other than that noted in the HPI, is negative.    Physical Exam: Vitals: BP (!) 165/90   Pulse 72   Temp 97.5  F (36.4  C)   Resp 16   Ht 1.676 m (5' 6\")   Wt 73.2 kg (161 lb 6.4 oz)   SpO2 95%   BMI 26.05 kg/m    GENERAL APPEARANCE: Alert, in no distress, cooperative.   ENT: Mouth/posterior oropharynx intact w/ moist mucous membranes, hearing acuity [place].  EYES: EOM, conjunctivae, lids, pupils and irises normal, PERRL2.   RESP: Respiratory effort unlabored, no respiratory distress, Lung sounds clear. On RA. "   CV: Auscultation of heart reveals S1, S2, rate and rhythm regular, no murmur, no rub or gallop, Edema 0+ BLE. Peripheral pulses are 2+.  ABDOMEN: Normal bowel sounds, soft, non-tender abdomen, and no masses palpated.  SKIN: Inspection/Palpation of skin and subcutaneous tissue baseline w/ fragility. No wounds/rashes noted.   NEURO: CN II-XII at patient's baseline, sensation baseline PPS.  PSYCH: Insight, judgement, and memory are baseline impaired, affect and mood are engaged, paranoid.    Facility Labs: Labs done in SNF are in [facility] EPIC. Please refer to them using EPIC/Care Everywhere.    DISCHARGE PLAN:    Follow up labs: No labs orders/due    Medical Follow Up:  Follow up with primary care provider in 2-4 weeks    MTM referral needed and placed by this provider: No    Current [facility] scheduled appointments: None.    Discharge Services: No therapy or home care recommended.     Orders written by provider at facility and transcribed by : Yamilka Tatum  1. Renew PRN Seroquel x 14 days - dx: atypical psychosis  2. FYI: refilled Oxycodone  3. UA/UC x 1 via CC - dx: dysuria    [facility] Controlled Substance Medication Fill:  Mehreen Camara  1940  Effective Jan 1, 2021, The [place] Board of Pharmacy has a new legislative requirement that requires checking the [place]  database before initially prescribing an opiate and every three months for patients receiving an opiate for treatment of chronic pain.   Request for controlled substance medication:     not checked due to meeting exception criteria: 4. the prescriber and patient have a current or ongoing provider/patient relationship of a duration longer than one year;    TOTAL DISCHARGE TIME:  Greater than 30 minutes    Electronically signed by:  Dr. Lisa Parry, APRN CNP DNP

## 2021-04-08 ENCOUNTER — RECORDS - HEALTHEAST (OUTPATIENT)
Dept: LAB | Facility: CLINIC | Age: 81
End: 2021-04-08

## 2021-04-08 ENCOUNTER — DISCHARGE SUMMARY NURSING HOME (OUTPATIENT)
Dept: GERIATRICS | Facility: CLINIC | Age: 81
End: 2021-04-08
Payer: COMMERCIAL

## 2021-04-08 ENCOUNTER — TRANSFERRED RECORDS (OUTPATIENT)
Dept: HEALTH INFORMATION MANAGEMENT | Facility: CLINIC | Age: 81
End: 2021-04-08

## 2021-04-08 VITALS
WEIGHT: 161.4 LBS | HEIGHT: 66 IN | HEART RATE: 72 BPM | DIASTOLIC BLOOD PRESSURE: 90 MMHG | RESPIRATION RATE: 16 BRPM | OXYGEN SATURATION: 95 % | TEMPERATURE: 97.5 F | SYSTOLIC BLOOD PRESSURE: 165 MMHG | BODY MASS INDEX: 25.94 KG/M2

## 2021-04-08 DIAGNOSIS — G31.83 LEWY BODY DEMENTIA WITHOUT BEHAVIORAL DISTURBANCE (H): ICD-10-CM

## 2021-04-08 DIAGNOSIS — F22 PARANOIA (H): ICD-10-CM

## 2021-04-08 DIAGNOSIS — M15.0 PRIMARY OSTEOARTHRITIS INVOLVING MULTIPLE JOINTS: ICD-10-CM

## 2021-04-08 DIAGNOSIS — K59.01 SLOW TRANSIT CONSTIPATION: ICD-10-CM

## 2021-04-08 DIAGNOSIS — S32.82XD MULTIPLE CLOSED FRACTURES OF PELVIS WITHOUT DISRUPTION OF PELVIC RING WITH ROUTINE HEALING, SUBSEQUENT ENCOUNTER: Primary | ICD-10-CM

## 2021-04-08 DIAGNOSIS — F02.818 DEMENTIA DUE TO MEDICAL CONDITION WITH BEHAVIORAL DISTURBANCE (H): ICD-10-CM

## 2021-04-08 DIAGNOSIS — F02.80 LEWY BODY DEMENTIA WITHOUT BEHAVIORAL DISTURBANCE (H): ICD-10-CM

## 2021-04-08 DIAGNOSIS — S32.591D FRACTURE OF MULTIPLE PUBIC RAMI WITH ROUTINE HEALING, RIGHT: ICD-10-CM

## 2021-04-08 DIAGNOSIS — I95.1 ORTHOSTATIC HYPOTENSION DYSAUTONOMIC SYNDROME: ICD-10-CM

## 2021-04-08 DIAGNOSIS — I10 ESSENTIAL HYPERTENSION: ICD-10-CM

## 2021-04-08 DIAGNOSIS — G23.2 MULTIPLE SYSTEM ATROPHY P (H): ICD-10-CM

## 2021-04-08 DIAGNOSIS — M54.41 CHRONIC BILATERAL LOW BACK PAIN WITH BILATERAL SCIATICA: ICD-10-CM

## 2021-04-08 DIAGNOSIS — Z79.899 POLYPHARMACY: ICD-10-CM

## 2021-04-08 DIAGNOSIS — M54.42 CHRONIC BILATERAL LOW BACK PAIN WITH BILATERAL SCIATICA: ICD-10-CM

## 2021-04-08 DIAGNOSIS — G89.29 CHRONIC BILATERAL LOW BACK PAIN WITH BILATERAL SCIATICA: ICD-10-CM

## 2021-04-08 PROCEDURE — 99316 NF DSCHRG MGMT 30 MIN+: CPT | Performed by: NURSE PRACTITIONER

## 2021-04-08 RX ORDER — OXYCODONE HYDROCHLORIDE 5 MG/1
5 TABLET ORAL EVERY 4 HOURS PRN
Qty: 30 TABLET | Refills: 0 | Status: SHIPPED | OUTPATIENT
Start: 2021-04-08 | End: 2022-05-16

## 2021-04-08 ASSESSMENT — MIFFLIN-ST. JEOR: SCORE: 1213.86

## 2021-04-08 NOTE — LETTER
2021        RE: Mehreen Camara  [facility]  [address]  [address]  [facility] [address]        [place] [facility] DISCHARGE SUMMARY  PATIENT'S NAME: Mehreen Camara : 1940 MRN: [id number] Place of Service where encounter took place:  [facility] () [[id number]] PRIMARY CARE PROVIDER AND CLINIC RESPONSIBLE AFTER TRANSFER: New [facility].    Transferring providers: HAILEE Gilbert CNP DNP; Ignacia Reyes MD  Recent Hospitalization/ED: [facility] stay 20 to 20.  Date of TCU Admission: 2020  Date of TCU (anticipated) Discharge: 21  Discharged to: new assisted living for patient.  Cognitive Scores: BIMS: 14/15, SLUMS:  and CPT: 3.6/5.6  Physical Function: Tinetti Balance Assessment: Previous was , but this has slightly improved.  DME: None.   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 She was found to have a right sacral ala and right pubic ramus fractures. She also had small pelvic edema/hemorrhage in the space of [place]. She was treated conservatively and will follow-up w/ ortho in 3  weeks. Mehreen presented to TCU on 12/30 s/p hospitalization.     She rehabbed well, and eventually was able to bear weight and can now walk independently with her walker. Given her baseline cognitive impairment, and her significant fall risk, her [facility] could not accept her back. Her MSA allows for good days and bad days, but it is believed this will continue to progress. Mehreen did not want to stay in TCU or LTC at the facility, and after she plateaued in rehab, she was moved to a room as she waited to find placement elsewhere.     During her stay, she has been comforted by PRN Seroquel as her paranoia sometimes extends to ADL refusal and places her at a safety risk. Pain and bowel management were challenging given the location of fractures for Mehreen, which are still healing. Her blood work has been stable and we were able to reduce polypharmacy slightly. She is followed by neurology, PharmD (both for MSA) and Ortho (arthritis/pain).     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  Discharge Medications:  Current Outpatient Medications   Medication Sig Dispense Refill     acetaminophen (TYLENOL) 500 MG tablet Take 2 tablets (1,000 mg) by mouth 3 times daily       carbidopa-levodopa (SINEMET)  MG tablet Take 1 tablet by mouth 3 times daily 180 tablet 11     diclofenac (VOLTAREN) 1 % topical gel Apply 2 g topically 4 times daily for 2 days       DULoxetine (CYMBALTA) 20 MG capsule Take 60 mg by mouth daily       gabapentin (NEURONTIN) 300 MG capsule Take 300-600 mg by mouth 3 times daily 300 mg PO QAM and Q noon  mg PO at bedtime       hydrALAZINE (APRESOLINE) 10 MG tablet Take 10 mg by mouth 3 times daily as needed for high blood pressure For SBP >170       hydrochlorothiazide (HYDRODIURIL) 25 MG tablet TAKE 1 TABLET BY MOUTH EVERY DAY DX  "HYPERTENSION AND LE EDEMA 31 tablet 11     levothyroxine (SYNTHROID/LEVOTHROID) 100 MCG tablet Take 1 tablet (100 mcg) by mouth daily       losartan (COZAAR) 50 MG tablet TAKE 1 TABLET BY MOUTH EVERY MORNING 31 tablet 11     menthol (ICY HOT) 5 % PTCH Apply 1 patch topically every 8 hours as needed for muscle soreness       oxyCODONE (ROXICODONE) 5 MG tablet Take 1 tablet (5 mg) by mouth every 4 hours as needed for severe pain 30 tablet 0     polyethylene glycol (MIRALAX) 17 GM/Dose powder Take 17 g by mouth 2 times daily as needed for constipation 510 g      polyethylene glycol-propylene glycol (SYSTANE ULTRA) 0.4-0.3 % SOLN ophthalmic solution Place 2 drops into both eyes 3 times daily (and additionally as needed/requested)       QUEtiapine (SEROQUEL) 25 MG tablet Take 1 tablet (25 mg) by mouth At Bedtime       QUEtiapine (SEROQUEL) 25 MG tablet Take 0.5 tablets (12.5 mg) by mouth 3 times daily as needed (agitation)       senna-docusate (SENOKOT-S/PERICOLACE) 8.6-50 MG tablet Take 1-2 tablets by mouth 2 times daily       simethicone (MYLICON) 125 MG chewable tablet Take 125 mg by mouth 3 times daily as needed       vitamin D3 (CHOLECALCIFEROL) 50 mcg (2000 units) tablet Take 1 tablet (50 mcg) by mouth daily       Medication Changes/Rationale:     Many.  Controlled medications sent with patient: Medication: Oxycodone , remaining tabs given to patient at the time of discharge to take home     ROS: Limited secondary to cognitive impairment but today pt reports the above and 4 point ROS including Respiratory, CV, GI and , other than that noted in the HPI, is negative.    Physical Exam: Vitals: BP (!) 165/90   Pulse 72   Temp 97.5  F (36.4  C)   Resp 16   Ht 1.676 m (5' 6\")   Wt 73.2 kg (161 lb 6.4 oz)   SpO2 95%   BMI 26.05 kg/m    GENERAL APPEARANCE: Alert, in no distress, cooperative.   ENT: Mouth/posterior oropharynx intact w/ moist mucous membranes, hearing acuity [place].  EYES: EOM, conjunctivae, lids, " pupils and irises normal, PERRL2.   RESP: Respiratory effort unlabored, no respiratory distress, Lung sounds clear. On RA.   CV: Auscultation of heart reveals S1, S2, rate and rhythm regular, no murmur, no rub or gallop, Edema 0+ BLE. Peripheral pulses are 2+.  ABDOMEN: Normal bowel sounds, soft, non-tender abdomen, and no masses palpated.  SKIN: Inspection/Palpation of skin and subcutaneous tissue baseline w/ fragility. No wounds/rashes noted.   NEURO: CN II-XII at patient's baseline, sensation baseline PPS.  PSYCH: Insight, judgement, and memory are baseline impaired, affect and mood are engaged, paranoid.    Facility Labs: Labs done in SNF are in [facility] EPIC. Please refer to them using [facility] Everywhere.    DISCHARGE PLAN:    Follow up labs: No labs orders/due    Medical Follow Up:  Follow up with primary care provider in 2-4 weeks    MTM referral needed and placed by this provider: No    Current [facility] scheduled appointments: None.    Discharge Services: No therapy or home care recommended.     Orders written by provider at facility and transcribed by : Yamilka Tatum  1. Renew PRN Seroquel x 14 days - dx: atypical psychosis  2. FYI: refilled Oxycodone  3. UA/UC x 1 via CC - dx: dysuria    [facility] Controlled Substance Medication Fill:  Mehreen Camara  1940  Effective Jan 1, 2021, The [place] Board of Pharmacy has a new legislative requirement that requires checking the [place]  database before initially prescribing an opiate and every three months for patients receiving an opiate for treatment of chronic pain.   Request for controlled substance medication:     not checked due to meeting exception criteria: 4. the prescriber and patient have a current or ongoing provider/patient relationship of a duration longer than one year;    TOTAL DISCHARGE TIME:  Greater than 30 minutes    Electronically signed by:  Dr. Lisa Parry, APRN CNP DNP                  Sincerely,        HAILEE Langley CNP

## 2021-04-09 ENCOUNTER — TELEPHONE (OUTPATIENT)
Dept: GERIATRICS | Facility: CLINIC | Age: 81
End: 2021-04-09

## 2021-04-09 NOTE — TELEPHONE ENCOUNTER
[facility] Nurse Triage Telephone Note    Provider: Lisa Parry NP  Facility: [facility]             Facility Type:  [facility]    Caller: Doreen  Call Back Number: [phone]    Allergies:    Allergies   Allergen Reactions     Penicillins Anaphylaxis, Rash and Shortness Of Breath     Aspirin Nausea and GI Disturbance     Atenolol Cough     Atorvastatin Muscle Pain (Myalgia) and Nausea and Vomiting     Bupropion Nausea     Cephalexin Rash     Localized to neck area     Clindamycin Other (See Comments)     Constipation      Codeine Nausea     Ibuprofen Nausea and GI Disturbance     Lovastatin Muscle Pain (Myalgia)        Reason for call: UA obtained as pt is not herself, AFeb, is eating. Squam Ep 4, WBC 15, Bacteria many, Nit neg, Leuk 250.    Verbal Order/Direction given by Provider: Await UC Final & Sensitivities.    Provider giving Order:  Meghan Headley NP    Verbal Order given to: Doreen Young RN

## 2021-04-10 LAB — BACTERIA SPEC CULT: NORMAL

## 2021-05-27 ENCOUNTER — RECORDS - HEALTHEAST (OUTPATIENT)
Dept: ADMINISTRATIVE | Facility: CLINIC | Age: 81
End: 2021-05-27

## 2021-05-28 NOTE — PATIENT INSTRUCTIONS - HE
Mehreen Camara,    It was a pleasure to see you today at the [facility].     My recommendations after this visit include:    Continue losartan    W. Hector Forrest MD, FACC, CLARI

## 2021-05-28 NOTE — PROGRESS NOTES
"Cardiology Progress Note    Assessment:  Hypertension,  labile with  pronounced orthostatic drops/autonomic dysfunction, satisfactory control  Multiple system atrophy  History of abdominal aortic aneurysm, status post endovascular repair  Aortic stenosis, mild to moderate, stable  GERD      Plan:  Continue current dose of losartan    She can use compressive stockings to minimize orthostasis and decrease ankle swelling.  I do not think we should be using diuretics.    Follow-up in 6 months    Subjective:   This is 79 y.o. female who comes in today for follow-up visit.  She reports no new episode of syncope or near syncope.  She brought her blood pressure charts with her.  Systolic pressure fluctuates from .  Most of the readings are between 120 and 150.    Review of Systems:   General: Weight Loss  Eyes: Visual Distubance  Ears/Nose/Throat: WNL  Lungs: Shortness of Breath  Heart: Leg Swelling  Stomach: Constipation, Diarrhea, Heartburn, Nausea  Bladder: Frequent Urination at Night  Muscle/Joints: Muscle Weakness  Skin: WNL  Nervous System: Daytime Sleepiness  Mental Health: Anxiety     Blood: WNL    Objective:   /76 (Patient Site: Left Arm, Patient Position: Sitting, Cuff Size: Adult Regular)   Pulse 88   Resp 14   Ht 5' 6.5\" (1.689 m)   Wt 143 lb (64.9 kg) Comment: With shoes  BMI 22.74 kg/m    Physical Exam:  GENERAL: no distress  NECK: No JVD  LUNGS: Clear to auscultation.  CARDIAC: regular rhythm, S1 & S2 normal.  No heaves, thrills, gallops 6 systolic ejection murmur at the aortic area  ABDOMEN: flat, negative hepatosplenomegaly, soft and non-tender.  EXTREMITIES: No evidence of cyanosis, clubbing trace edema.    Current Outpatient Medications   Medication Sig Dispense Refill     acetaminophen (TYLENOL) 500 MG tablet Take 2 tablets (1,000 mg total) by mouth 3 (three) times a day. (Patient taking differently: Take 1,000 mg by mouth 3 (three) times a day. )  0     bisacodyl (DULCOLAX) 10 mg " suppository Insert 10 mg into the rectum daily as needed.       calcium, as carbonate, (TUMS) 200 mg calcium (500 mg) chewable tablet Chew 500 mg as needed.       carbidopa-levodopa (SINEMET)  mg per tablet Take 2 tablets by mouth 3 (three) times a day.  0     cholecalciferol, vitamin D3, 1,000 unit tablet Take 2,000 Units by mouth daily.        diclofenac sodium (VOLTAREN) 1 % Gel Lower extremities: 4 g to the affected area 4 times daily. Upper extremities: 2 g to the affected area 4 times daily. Do not exceed 32 g total dose per day. (Patient taking differently: 2 g 4 (four) times a day. Diclofenac Sodium Gel 1 % Apply 2 gram  transdermally four times a day for Pain Apply to upper  extremities Do not exceed 32 g total per day)  0     gabapentin (NEURONTIN) 100 MG capsule Take 100 mg by mouth 2 (two) times a day. And 2 tablets at bedtime 360 capsule 3     HYDROcodone-acetaminophen 5-325 mg per tablet TAKE 1 TABLET BY MOUTH EVERY DAY (MAX APAP 4GM/24HRS);TAKE 1 TABLET BY MOUTH TWICE DAILY AS NEEDED FOR PAIN (MAX APAP 4GM/24HRS)       levothyroxine (SYNTHROID, LEVOTHROID) 137 MCG tablet Take 1 tablet (137 mcg total) by mouth daily. 90 tablet 2     losartan (COZAAR) 50 MG tablet Take 1 tablet (50 mg total) by mouth daily. 90 tablet 1     multivitamin (MULTIPLE VITAMIN ORAL) Take 1 tablet by mouth daily.       omeprazole (PRILOSEC) 20 MG capsule Take 20 mg by mouth 2 (two) times a day before meals.        peg 400-propylene glycol (SYSTANE) 0.4-0.3 % Drop Administer 1 drop to both eyes 4 (four) times a day as needed.       ranitidine (ZANTAC) 150 MG tablet Take 150 mg by mouth at bedtime.       senna (SENOKOT) 8.6 mg tablet Take 1 tablet by mouth.       triamcinolone (KENALOG) 0.1 % cream Apply thin layer to feet up to twice daily as needed (Patient taking differently: 2 (two) times a day as needed. Apply thin layer to feet up to twice daily as needed) 30 g 0     zinc oxide (DESITIN) 13 % Crea Apply topically.        betamethasone dipropionate (DIPROLENE) 0.05 % cream Apply twice a day to perineal rash 30 g 0     citalopram (CELEXA) 20 MG tablet Take 1.5 tablets (30 mg total) by mouth Daily after lunch.  0     clotrimazole (LOTRIMIN) 1 % cream Applied twice a day to perineal rash 30 g 0     lidocaine 4 % patch Place 1 patch on the skin daily. Remove and discard patch with 12 hours or as directed by MD.       melatonin 3 mg Tab tablet Take 3 mg by mouth at bedtime as needed.        spironolactone (ALDACTONE) 25 MG tablet Take 0.5 tablets (12.5 mg total) by mouth daily. 30 tablet 6     spironolactone (ALDACTONE) 25 MG tablet Take 0.5 tablets (12.5 mg total) by mouth daily. 30 tablet 6     No current facility-administered medications for this visit.        Cardiographics:    Holter: June 2017  Normal     Echo: September 2018    When compared to the previous study dated 5/6/2017, no significant change.    Left ventricle ejection fraction is normal. The calculated left ventricular ejection fraction is 65%.    Mild concentric left ventricular hypertrophy    Normal right ventricular size and systolic function.    Mild aortic stenosis with trace aortic insufficiency         Stress Test: November 2015  1. Lexiscan stress nuclear study is negative for inducible myocardial   ischemia or infarction.         Lab Results:       Lab Results   Component Value Date    CHOL 256 (H) 09/22/2015    CHOL 241 (H) 03/10/2015    CHOL 252 (H) 11/10/2014     Lab Results   Component Value Date    HDL 43 09/22/2015    HDL 43 03/10/2015    HDL 35 (L) 11/10/2014     Lab Results   Component Value Date    LDLCALC 195 (H) 09/22/2015    LDLCALC 168 (H) 03/10/2015    LDLCALC 174 (H) 11/10/2014     Lab Results   Component Value Date    TRIG 91 09/22/2015    TRIG 149 03/10/2015    TRIG 216 (H) 11/10/2014     BNP   Date Value Ref Range Status   10/25/2015 69 0 - 137 pg/mL Final       Parag (Hector)  MD Adriane

## 2021-05-30 VITALS — BODY MASS INDEX: 27.64 KG/M2 | WEIGHT: 172 LBS | HEIGHT: 66 IN

## 2021-05-30 VITALS — WEIGHT: 172 LBS | HEIGHT: 66 IN | BODY MASS INDEX: 27.64 KG/M2

## 2021-05-30 VITALS — WEIGHT: 164.1 LBS | HEIGHT: 66 IN | BODY MASS INDEX: 26.37 KG/M2

## 2021-05-30 VITALS — WEIGHT: 172 LBS | BODY MASS INDEX: 27.76 KG/M2

## 2021-05-30 VITALS — WEIGHT: 164.4 LBS | BODY MASS INDEX: 26.53 KG/M2

## 2021-05-30 VITALS — WEIGHT: 170.4 LBS | BODY MASS INDEX: 27.5 KG/M2

## 2021-05-31 VITALS — HEIGHT: 66 IN | WEIGHT: 158.8 LBS | BODY MASS INDEX: 25.52 KG/M2

## 2021-05-31 VITALS — BODY MASS INDEX: 25.29 KG/M2 | WEIGHT: 156.7 LBS

## 2021-05-31 VITALS — HEIGHT: 66 IN | BODY MASS INDEX: 24.11 KG/M2 | WEIGHT: 150 LBS

## 2021-05-31 VITALS — WEIGHT: 151.8 LBS | BODY MASS INDEX: 24.5 KG/M2

## 2021-05-31 VITALS — HEIGHT: 66 IN | BODY MASS INDEX: 25.23 KG/M2 | WEIGHT: 157 LBS

## 2021-05-31 VITALS — BODY MASS INDEX: 26.04 KG/M2 | WEIGHT: 161.31 LBS

## 2021-05-31 VITALS — BODY MASS INDEX: 24.36 KG/M2 | HEIGHT: 66 IN | WEIGHT: 151.6 LBS

## 2021-05-31 VITALS — BODY MASS INDEX: 25.94 KG/M2 | WEIGHT: 161.4 LBS | HEIGHT: 66 IN

## 2021-05-31 VITALS — WEIGHT: 157.2 LBS | BODY MASS INDEX: 25.37 KG/M2

## 2021-05-31 VITALS — WEIGHT: 153.5 LBS | BODY MASS INDEX: 24.78 KG/M2

## 2021-05-31 VITALS — WEIGHT: 150 LBS | BODY MASS INDEX: 24.21 KG/M2

## 2021-05-31 VITALS — WEIGHT: 151 LBS | BODY MASS INDEX: 24.37 KG/M2

## 2021-05-31 VITALS — BODY MASS INDEX: 26.36 KG/M2 | WEIGHT: 163.3 LBS

## 2021-05-31 VITALS — BODY MASS INDEX: 24.02 KG/M2 | WEIGHT: 148.8 LBS

## 2021-05-31 NOTE — TELEPHONE ENCOUNTER
----- Message -----  From: Ann Marie Rajan  Sent: 8/9/2019  10:31 AM  To: Larisa Echeverria RN    Caller: Mi, pts daughter.  Primary cardiologist: Dr. Forrest  Detailed reason for call: Mi stated you can disregard her previous message from 8/6/19 regarding leg pain caused by vascular since her mother got an MRI it was determined it's a muscle ache, not vascular. Please call back if questions.   Best phone number: Work [facility]  [phone] or cell [phone]  Best time to contact: Any          Leg pain related to muscle ache, no longer needs to see vascular. -kcl

## 2021-05-31 NOTE — TELEPHONE ENCOUNTER
----- Message from Ann Marie Rajan sent at 8/6/2019  8:55 AM CDT -----  Contact: Daughter  Caller: Mi (daughter)     Primary cardiologist: Dr. Forrest    Detailed reason for call: Mi states Mehreen's had swelling in left leg, she has a hx of AAA stent. She has worsened to needing a wheelchair and is having an MRI of leg today. Another provider suggested she see a vascular MD but Mi asks for Dr. Forrest's recommendation? Please call back.     Best phone number: [facility]  [phone] until 12:30pm today or [phone] (cell)    Best time to contact: Today    Ok to leave a detailed message? Yes    Device? No          Called back Mi to address her concerns. Informed Mi that WTZ is out of the office until Monday and can address upon his return and she is agreeable to this. Pt has left leg swelling and weakness and pain. She can no longer bear weight. She is having a MRI today of it and her Ortho MD believes that it is more than arthritis and recommend that she see vascular. MRI results should be available in about 48 hours. They want to know if Dr. Forrest would recommend that the patient see vascular. Will pass along.    Dr. Forrest,  See above for your return. Rec seeing vascular or follow up with you? re: left leg swelling.  Thanks,  Mal

## 2021-06-01 VITALS — BODY MASS INDEX: 25.34 KG/M2 | WEIGHT: 157 LBS

## 2021-06-01 VITALS — BODY MASS INDEX: 24.91 KG/M2 | WEIGHT: 155 LBS | HEIGHT: 66 IN

## 2021-06-01 VITALS — BODY MASS INDEX: 25.07 KG/M2 | WEIGHT: 156 LBS | HEIGHT: 66 IN

## 2021-06-01 VITALS — BODY MASS INDEX: 25.07 KG/M2 | HEIGHT: 66 IN | WEIGHT: 156 LBS

## 2021-06-01 VITALS — WEIGHT: 158 LBS | BODY MASS INDEX: 25.5 KG/M2

## 2021-06-01 VITALS — WEIGHT: 152 LBS | BODY MASS INDEX: 24.53 KG/M2

## 2021-06-01 VITALS — BODY MASS INDEX: 24.37 KG/M2 | WEIGHT: 151 LBS

## 2021-06-01 VITALS — BODY MASS INDEX: 25.57 KG/M2 | WEIGHT: 158.4 LBS

## 2021-06-01 VITALS — WEIGHT: 154 LBS | BODY MASS INDEX: 24.86 KG/M2

## 2021-06-01 VITALS — BODY MASS INDEX: 23.57 KG/M2 | WEIGHT: 146 LBS

## 2021-06-01 VITALS — WEIGHT: 155.6 LBS | BODY MASS INDEX: 24.74 KG/M2

## 2021-06-01 VITALS — BODY MASS INDEX: 25.24 KG/M2 | WEIGHT: 156.4 LBS

## 2021-06-01 VITALS — BODY MASS INDEX: 24.89 KG/M2 | WEIGHT: 154.2 LBS

## 2021-06-01 VITALS — BODY MASS INDEX: 24.47 KG/M2 | WEIGHT: 151.6 LBS

## 2021-06-01 VITALS — WEIGHT: 161 LBS | HEIGHT: 66 IN | BODY MASS INDEX: 25.88 KG/M2

## 2021-06-01 VITALS — BODY MASS INDEX: 25.82 KG/M2 | WEIGHT: 160 LBS

## 2021-06-01 VITALS — WEIGHT: 148 LBS | BODY MASS INDEX: 23.23 KG/M2 | HEIGHT: 67 IN

## 2021-06-01 VITALS — BODY MASS INDEX: 25.18 KG/M2 | WEIGHT: 156 LBS

## 2021-06-01 VITALS — BODY MASS INDEX: 25.02 KG/M2 | WEIGHT: 155 LBS

## 2021-06-01 VITALS — WEIGHT: 156 LBS | BODY MASS INDEX: 25.18 KG/M2

## 2021-06-01 VITALS — WEIGHT: 155 LBS | BODY MASS INDEX: 25.02 KG/M2

## 2021-06-02 VITALS — BODY MASS INDEX: 24.1 KG/M2 | WEIGHT: 151.6 LBS

## 2021-06-02 VITALS — BODY MASS INDEX: 23.7 KG/M2 | HEIGHT: 67 IN | WEIGHT: 151 LBS

## 2021-06-02 VITALS — HEIGHT: 67 IN | BODY MASS INDEX: 23.23 KG/M2 | WEIGHT: 148 LBS

## 2021-06-02 VITALS — BODY MASS INDEX: 22.91 KG/M2 | HEIGHT: 67 IN | WEIGHT: 146 LBS

## 2021-06-03 ENCOUNTER — RECORDS - HEALTHEAST (OUTPATIENT)
Dept: ADMINISTRATIVE | Facility: CLINIC | Age: 81
End: 2021-06-03

## 2021-06-03 VITALS — HEIGHT: 67 IN | WEIGHT: 143 LBS | BODY MASS INDEX: 22.44 KG/M2

## 2021-06-03 NOTE — PATIENT INSTRUCTIONS - HE
Mehreen Camara,    It was a pleasure to see you today at the [facility].     My recommendations after this visit include:    Same medication    WMarielos Forrest MD, FACC, CLARI

## 2021-06-03 NOTE — PROGRESS NOTES
"Cardiology Progress Note    Assessment:  Hypertension,  labile with  pronounced orthostatic drops/autonomic dysfunction, satisfactory control  Multiple system atrophy  History of abdominal aortic aneurysm, status post endovascular repair  Aortic stenosis, mild to moderate, stable  GERD      Plan:  No medication changes today    Reassess in 6 months    Subjective:   This is 79 y.o. female who comes in today for visit.  She tore her left Achilles tendon.  She is using both.  She did not undergo surgery.  She does not walk anymore.  Her blood pressures continues to fluctuate.  She has not had syncope or near syncope.  She was seen by neurologist at the [facility].  She was offered research study has performed [facility].  She thinks this is too much hassle to go to [place]    Review of Systems:   General: Weight Loss, Weight Gain  Eyes: Visual Distubance  Ears/Nose/Throat: WNL  Lungs: WNL  Heart: WNL  Stomach: Constipation, Heartburn, Nausea  Bladder: Frequent Urination at Night  Muscle/Joints: Joint Pain, Muscle Weakness, Muscle Pain  Skin: WNL  Nervous System: Daytime Sleepiness, Dizziness, Loss of Balance  Mental Health: Depression, Anxiety     Blood: WNL    Objective:   /70 (Patient Site: Left Arm, Patient Position: Sitting, Cuff Size: Adult Large)   Pulse 70   Resp 16   Ht 5' 6.5\" (1.689 m)   Wt 157 lb (71.2 kg)   BMI 24.96 kg/m    Physical Exam:  GENERAL: no distress  NECK: No JVD  LUNGS: Clear to auscultation.  CARDIAC: regular rhythm, S1 & S2 normal.  No heaves, thrills, gallops 2/6 systolic ejection murmur at the aortic area  ABDOMEN: flat, negative hepatosplenomegaly, soft and non-tender.  EXTREMITIES: No evidence of cyanosis, clubbing or edema.    Current Outpatient Medications   Medication Sig Dispense Refill     acetaminophen (TYLENOL) 500 MG tablet Take 2 tablets (1,000 mg total) by mouth 3 (three) times a day. (Patient taking differently: Take 1,000 mg by mouth 3 (three) times " a day. )  0     betamethasone dipropionate (DIPROLENE) 0.05 % cream Apply twice a day to perineal rash 30 g 0     carbidopa-levodopa (SINEMET)  mg per tablet Take 2 tablets by mouth 3 (three) times a day.  0     cholecalciferol, vitamin D3, 1,000 unit tablet Take 2,000 Units by mouth daily.        diclofenac sodium (VOLTAREN) 1 % Gel Lower extremities: 4 g to the affected area 4 times daily. Upper extremities: 2 g to the affected area 4 times daily. Do not exceed 32 g total dose per day. (Patient taking differently: 2 g 4 (four) times a day. Diclofenac Sodium Gel 1 % Apply 2 gram  transdermally four times a day for Pain Apply to upper  extremities Do not exceed 32 g total per day)  0     gabapentin (NEURONTIN) 100 MG capsule Take 100 mg by mouth 2 (two) times a day. And 2 tablets at bedtime 360 capsule 3     HYDROcodone-acetaminophen 5-325 mg per tablet TAKE 1 TABLET BY MOUTH EVERY DAY (MAX APAP 4GM/24HRS);TAKE 1 TABLET BY MOUTH TWICE DAILY AS NEEDED FOR PAIN (MAX APAP 4GM/24HRS)       levothyroxine (SYNTHROID, LEVOTHROID) 137 MCG tablet Take 1 tablet (137 mcg total) by mouth daily. 90 tablet 2     losartan (COZAAR) 50 MG tablet TAKE TABLET BY MOUTH EVERY MORNING 90 tablet 1     melatonin 3 mg Tab tablet Take 3 mg by mouth at bedtime as needed.        multivitamin (MULTIPLE VITAMIN ORAL) Take 1 tablet by mouth daily.       peg 400-propylene glycol (SYSTANE) 0.4-0.3 % Drop Administer 1 drop to both eyes 4 (four) times a day as needed.       ranitidine (ZANTAC) 150 MG tablet Take 150 mg by mouth at bedtime.       senna (SENOKOT) 8.6 mg tablet Take 1 tablet by mouth.       bisacodyl (DULCOLAX) 10 mg suppository Insert 10 mg into the rectum daily as needed.       calcium, as carbonate, (TUMS) 200 mg calcium (500 mg) chewable tablet Chew 500 mg as needed.       citalopram (CELEXA) 20 MG tablet Take 1.5 tablets (30 mg total) by mouth Daily after lunch.  0     clotrimazole (LOTRIMIN) 1 % cream Applied twice a day to  perineal rash 30 g 0     lidocaine 4 % patch Place 1 patch on the skin daily. Remove and discard patch with 12 hours or as directed by MD.       omeprazole (PRILOSEC) 20 MG capsule Take 20 mg by mouth 2 (two) times a day before meals.        spironolactone (ALDACTONE) 25 MG tablet Take 0.5 tablets (12.5 mg total) by mouth daily. 30 tablet 6     spironolactone (ALDACTONE) 25 MG tablet Take 0.5 tablets (12.5 mg total) by mouth daily. 30 tablet 6     triamcinolone (KENALOG) 0.1 % cream Apply thin layer to feet up to twice daily as needed (Patient taking differently: 2 (two) times a day as needed. Apply thin layer to feet up to twice daily as needed) 30 g 0     zinc oxide (DESITIN) 13 % Crea Apply topically.       No current facility-administered medications for this visit.        Cardiographics:    Holter: June 2017  Normal     Echo: September 2018    When compared to the previous study dated 5/6/2017, no significant change.    Left ventricle ejection fraction is normal. The calculated left ventricular ejection fraction is 65%.    Mild concentric left ventricular hypertrophy    Normal right ventricular size and systolic function.    Mild aortic stenosis with trace aortic insufficiency         Stress Test: November 2015  1. Lexiscan stress nuclear study is negative for inducible myocardial   ischemia or infarction.     Lab Results:       Lab Results   Component Value Date    CHOL 256 (H) 09/22/2015    CHOL 241 (H) 03/10/2015    CHOL 252 (H) 11/10/2014     Lab Results   Component Value Date    HDL 43 09/22/2015    HDL 43 03/10/2015    HDL 35 (L) 11/10/2014     Lab Results   Component Value Date    LDLCALC 195 (H) 09/22/2015    LDLCALC 168 (H) 03/10/2015    LDLCALC 174 (H) 11/10/2014     Lab Results   Component Value Date    TRIG 91 09/22/2015    TRIG 149 03/10/2015    TRIG 216 (H) 11/10/2014     BNP   Date Value Ref Range Status   10/25/2015 69 0 - 137 pg/mL Final       Parag (Hector)  MD Adriane

## 2021-06-04 VITALS
HEIGHT: 67 IN | BODY MASS INDEX: 24.64 KG/M2 | DIASTOLIC BLOOD PRESSURE: 70 MMHG | RESPIRATION RATE: 16 BRPM | SYSTOLIC BLOOD PRESSURE: 110 MMHG | WEIGHT: 157 LBS | HEART RATE: 70 BPM

## 2021-06-05 VITALS
RESPIRATION RATE: 14 BRPM | HEART RATE: 69 BPM | HEIGHT: 66 IN | SYSTOLIC BLOOD PRESSURE: 130 MMHG | WEIGHT: 155 LBS | BODY MASS INDEX: 24.91 KG/M2 | DIASTOLIC BLOOD PRESSURE: 80 MMHG

## 2021-06-05 NOTE — TELEPHONE ENCOUNTER
"----- Message from Ami Mobley sent at 1/27/2020 11:43 AM CST -----  General phone call:    Caller: Charity from [facility]  Primary cardiologist: Dr. Forrest  Detailed reason for call: Patients blood pressure has been running 220/121  New or active symptoms? Blood pressure high  Best phone number: [phone]  Best time to contact: Anytime  Ok to leave a detailed message? Yes  Device? no    Additional Info:          Called back Charity to address her concerns. She states that Mehreen reported a high blood pressure to the nursing assistant this morning of 184/117 so the nurse at her assisted living went to assess the patient. Charity reports that her blood pressure on her left arm was 220/121 and on her right it was 210/114. The patient is complaining of a lot of back pain and a headache and is \"pretty worked up\". She wonders if they should send her in to be evaluated. Pt was last see by [facility] in November of 2019 where BP was noted to be in good control. Due to the patient reporting pain and headache and has elevated blood pressures, writer recommended that it would be best if she is evaluated more urgently. The NP is not rounding at the [facility] today. Charity will have Mehreen evaluated in the ER to make sure nothing more acute is going on. Will forward update to [facility].      Dr. Forrest,  [facility] only- pt has elevated BP, back pain and headache. Recommended Emergency room evaluation.  Thanks,  Mal   "

## 2021-06-08 NOTE — PROGRESS NOTES
"at next outreach:Please reassure Mehreen when you speak to her next that these BPs are OK. Unless we see >1 day of BPs that are consistently >160/90 or >1 day of BPs that are <90/60 AND she feels hypotensive (NEW dizziness, lightheadedness), then she should continue taking medications as she currently is.    Updates:  1/5/16-Spoke with Mehreen, she is well she just had a bunch of questions about her BP meds. She says that the Endo gave her new instructions and she doesn't feel comfortable taking that much. She would like another opinion. CG set appt with Zhou SALINAS.   12/8/16- pt is well, she says that endo. Increased her BP meds. They also had her wear a 24 hr BP monitor to see what it is like. Pt does admit that she hasn't been taking medications as prescribed because she is afraid that her BPs may drop too low. CG assured pt that she should be taking meds as prescribed because they need to get the right dosing.   11/11/16- Pt was just in to see PCP today, no new updates at this time  10/24/16- pt is doing okay she says that her BPs have been all over the place lately, she is beginning to worry. I will send Zhou a message of pts latest BPs. Pt was also wondering about a form that she dropped off for her to get her license back. CG say a letter in the chart for last year. Pt says that she needs that same letter just updates. CG will send a message about that also.   9/11/16- 2nd monthly f/up call  8/11/16- 1st monthly f/up   7/8/2016- Pt states that she is feeling generally better. She does state that she can feel when her BP is too high or low (she will take BP whenever she feels \"weird\"). CG will f/up in 2weeks.   6/23/16- spoke with Pt, says she feels better after talking with Zhou Tapia about her meds. CG will f/up in 2weeks.  6/22/16- Care called Pt to introduce herself. Pt told CG that she has not been feeling the greatest lately. Reports low blood pressure checks (64/49, 84/50, 94/49) Also complains of light " "headedness and weird feeling in her legs \"it as if my legs aren't there. \" CG sent a message to Danni Ortiz MD. CG will follow up with Pt tomorrow, to see how she is doing.   5/12/16- Mehreen is watching her sodium intake and tells me her BP was up to 179/93 one time on Tuesday and agrees to call if it is that high again.   4/11/16- patient called back(Letter not sent) Had some questions regarding her blood pressure, message sent to Danni Ortiz MD  4/11/16- 3rd monthly follow up call and letter sent.   Additional Resources Delegation:   Patient has scheduled surgery on 11/30/15 - completed   - After that, Neuropsychology referral made by Dr. Cui. Please assist with setting up if needed. - completed and Mehreen advised that she will not go back as she did not like it  - Also, referral made by Dr. Cui for Occupational Therapy. Please assist with setting up if needed. - Mehreen went once and tells me she had a bill and she is not wanting to continue as she did not feel it was helpful   - [facility] RN to assist with medication management/education as needed.   - [facility] SW to assist with community resources and with possible housing needs. (Lives in independent senior housing but due to having to put out garbage and getting to garage, thinks she may have to move sometime in future.)   "

## 2021-06-08 NOTE — PROGRESS NOTES
MTM Encounter    Assessment/Plan  Hypertension: Somewhat controlled.  Mehreen's concern about the increased dose of hydralazine seems warranted.  With a 10mg dose, her SBP drops anywhere from 30-60 points over the next 6-8 hours.  I have sent a new Rx for the 10mg dose and asked her not to take the 25mg dose.  The extra 1/2 dose of carvedilol does seem to be helpful however, and it appears that she would benefit from this most days.  She was previously on 25mg BID, but was having hypotensive symptoms at that time.  We will try an intermediate dose of 18.75mg for a bit and see how she does with that.  Mehreen also agrees to start checking her BP less - I think this will be beneficial for her as her BP fluctuates every day with or without medication and seems to be causing a lot of unnecessary anxiety.  - Hydralazine 5-10mg PRN for SBP >160  - Increase 12.5mg carvedilol to 1.5 tablets BID  - Check BP four times daily two days per week    Anxiety/Depression: Mehreen has already self decreased her sertraline to 50mg daily again and plans to stay at this dose for now.  - Continue current therapy    Follow Up  Six weeks.  Mehreen to call if she has any concerns regarding BP    Subjective/Objective  Mehreen Camara is a 77 y.o. female here for a medication therapy management (MTM) appointment; her chief concern today is hypertension.    Hypertension:  Hydralazine was recently increased from 10mg BID PRN to 25mg BID.  She has not started taking the 25mg tablet yet.  Endocrinology also increased her carvedilol to 12.5mg BID and an additional 6.25mg BID PRN if her SBP is >160. She has sometimes takes the extra carvedilol.  Mehreen saw [facility] Bob Alvarez at [facility] and she thinks he told her not to take the 25mg hydralazine if she was worried it would drop her BP too low.  When Mehreen's BP is too high, she first takes 5-10mg hydralazine, if it is still high a few hours later, she will then take the 6.25mg carvedilol.  For the past  two weeks, Mehreen's BP is as follows  530AM: 160/86, 133/88, 133/84, 158/82, 168/86, 120/78, 121/77, 84/58, 153/92, 161/86, 114/74, 129/78  830AM: 186/94, 143/82, 171/86, 119/64, 123/72, 129/77, 80/57,106/74, 154/88, 170/99, 89/61  200PM: 88/56, 117/67, 151/70, 162,90,123/72, 129/77, 163/90, 159/93, 180/95, 120/70, 112/72,119/73  500PM: 130/73, 142/79, 165/82, 158/88, 108/70, 11/67, 108/66,117/65, 153/87, 147/88, 126/75, 143/87, 105/58    Depression/Anxiety: Did not feel like the increased dose was helpful at all, it just made her tired so she has gone back down to 50mg of sertraline.  She took 100mg for about a month.  She would like to wean off of this medication.  ----------------    Mehreen's medication list was reviewed with them, discussing reason for use, directions for use, and potential side effects of each medication as needed. Indication, safety, efficacy, and convenience was assessed for all medications addressed above.  No environmental factors were noted currently affecting patient.  This care plan was communicated via EMR with her primary care provider, Danni Ortiz MD, who is the authorizing prescriber for this visit.  Direct supervision was available by either the patient's PCP or other available provider.  Time and complexity billing metrics are included in the docflowsheet linked to this visit.  Time spent: 30 minutes      Rian Tapia, PharmD  [facility] Pharmacist  [facility]  [phone]

## 2021-06-08 NOTE — PROGRESS NOTES
"at next outreach:Please reassure Mehreen when you speak to her next that these BPs are OK. Unless we see >1 day of BPs that are consistently >160/90 or >1 day of BPs that are <90/60 AND she feels hypotensive (NEW dizziness, lightheadedness), then she should continue taking medications as she currently is.    Updates:  2/17/17- patient is well, her BP's have still been up and down but they haven't been constantly high. She is still following Zhou to work on this.   1/5/16-Spoke with Mehreen, she is well she just had a bunch of questions about her BP meds. She says that the Endo gave her new instructions and she doesn't feel comfortable taking that much. She would like another opinion. CG set appt with Zhou SALINAS.   12/8/16- pt is well, she says that endo. Increased her BP meds. They also had her wear a 24 hr BP monitor to see what it is like. Pt does admit that she hasn't been taking medications as prescribed because she is afraid that her BPs may drop too low. CG assured pt that she should be taking meds as prescribed because they need to get the right dosing.   11/11/16- Pt was just in to see PCP today, no new updates at this time  10/24/16- pt is doing okay she says that her BPs have been all over the place lately, she is beginning to worry. I will send Zhou a message of pts latest BPs. Pt was also wondering about a form that she dropped off for her to get her license back. CG say a letter in the chart for last year. Pt says that she needs that same letter just updates. CG will send a message about that also.   9/11/16- 2nd monthly f/up call  8/11/16- 1st monthly f/up   7/8/2016- Pt states that she is feeling generally better. She does state that she can feel when her BP is too high or low (she will take BP whenever she feels \"weird\"). CG will f/up in 2weeks.   6/23/16- spoke with Pt, says she feels better after talking with Zhou Tapia about her meds. CG will f/up in 2weeks.  6/22/16- Care called Pt to introduce herself. " "Pt told CG that she has not been feeling the greatest lately. Reports low blood pressure checks (64/49, 84/50, 94/49) Also complains of light headedness and weird feeling in her legs \"it as if my legs aren't there. \" CG sent a message to Danni Ortiz MD. CG will follow up with Pt tomorrow, to see how she is doing.   5/12/16- Mehreen is watching her sodium intake and tells me her BP was up to 179/93 one time on Tuesday and agrees to call if it is that high again.   4/11/16- patient called back(Letter not sent) Had some questions regarding her blood pressure, message sent to Danni Ortiz MD  4/11/16- 3rd monthly follow up call and letter sent.       "

## 2021-06-09 NOTE — PROGRESS NOTES
Assessment/Plan:        Diagnoses and all orders for this visit:    Skin spots, red  -     Ambulatory referral to Dermatology   DD: age spot, contusion, hemangioma. If not self limiting, evaluate with dermatology.  Esophageal reflux  -     omeprazole (PRILOSEC) 20 MG capsule; TAKE ONE CAPSULE BY MOUTH TWICE DAILY  Dispense: 180 capsule; Refill: 3            Subjective:    Patient ID: Mehreen Camara is a 77 y.o. female.    HPI patient here to have a skin spot checked.  Noticed it 4 days ago when she woke up she had a bright red heart shaped spot on her anterior chest.  Denies any pain, injury, trauma.  No history of cancer, fever, chills, body aches, malaise, unexpected weight loss.    The following portions of the patient's history were reviewed and updated as appropriate: allergies, current medications, past family history, past medical history, past social history, past surgical history and problem list.    Review of Systems   Skin:        Skin lesion on chest   All other systems reviewed and are negative.            Objective:    Physical Exam   Constitutional: She is oriented to person, place, and time. She appears well-developed and well-nourished.   Neurological: She is alert and oriented to person, place, and time. She has normal reflexes.   Skin: Skin is warm and dry.   4 mm asymmetric flat red lesion without superificial skin change, no patchiness, flakiness of superficial skin. No pain with palpation.   Psychiatric: She has a normal mood and affect. Her behavior is normal. Judgment and thought content normal.

## 2021-06-09 NOTE — PROGRESS NOTES
[facility] Daily Progress     Patient Name: Mehreen Camara  Date: 3/24/2017  Visit #: 3/6   PTA visit #: 1   Referral Diagnosis: Low back pain, hip pain  Referring provider: Danni Ortiz MD Dr. Jennifer Kendall Thomas   Visit Diagnosis:     ICD-10-CM    1. Sacral pain M53.3    2. Acute pain of left hip M25.552    3. Generalized muscle weakness M62.81    4. Low back pain M54.5    5. Chronic bilateral low back pain with right-sided sciatica M54.41     G89.29          Assessment:   Limited lumbar and pelvic mobility  Patient was very sore the day after last visit. She was very uncomfortable with L hip PROM and  Gentle hip stretching  Significant antalgia with gait today     Goal Status:  Pt. will be independent with home exercise program in : 2 weeks  Pt will: walk indoors/outdoors for >10 minutes with pain <3/10 on NPRS to progress towards returning to exercise in 6 visits   Pt will: roll supine<>sidelying without increased L sided pain in order to improve sleep in 6 visits   Pt will: bend over to tie shoes in sitting with pain <3/10 on NPRS to asisst with dressing in 6 visits  Pt will: tolerate stand for >10 minutes without seated rest in order to assist with independent meal preparation in 6 visits     Plan / Patient Education:     Continue with initial plan of care.  Progress with home program as tolerated.  Continue with manual therapy as tolerated and gentle core/hip strengthening as tolerated  Subjective:       Pain Ratin  Stretching and massage from last visit seems to have increased pain in L groin radiating to lateral and back   She has been in more pain since then  Objective:     Pt had a significant  Increase  in  groin pain with L hip PROM      Treatment Today     TREATMENT MINUTES COMMENTS   Evaluation     Self-care/ Home management  Discussed heat and cold, heat preferred   Manual therapy 10 L hip gentle ROM in supine. Attempted R sidelying position for L gluteal massage and  "trigger point release when  R hip became sore   Neuromuscular Re-education     Therapeutic Activity     Therapeutic Exercises 20 Initiated HEP:  Exercises:  Exercise #1: Supine hip flexor stretch, attempted to modify with lumbar towel roll and pillow on floor under foot to stretch with leg support  Comment #1: HEP x 30 seconds x 2-3, pt with L LE off EOB.Pt to trial above modification  Exercise #2: Abdominal sets  Comment #2: HEP x 10 seconds x 10 reps, cues for breathing. Attempted march,leg slide, unable due to increased pain  Exercise #3: Hooklying abd set with unilateral hip abduction, partial range to tolerance  Comment #3: x 5 bilaterally  Exercise #4: Gluteal  and hip add isometrics  Comment #4: seated or supine , 5\" holds x 5-10 as tolerated  Exercise #5: PPT  Comment #5: 5\" x 10     Gait training     Modality__________________                Total 30    Blank areas are intentional and mean the treatment did not include these items.       Peri Prescott,KAILEET  3/24/2017    "

## 2021-06-09 NOTE — PROGRESS NOTES
SUBJECTIVE:   Chief Complaint   Patient presents with     Shoulder Pain     c/o left shoulder pain; states she has a lot of pain when she lifts her arm up and down; per pt 'i can hear popping or bone cracking in my shoulder'     Mehreen Camara 77 y.o. female    Current Outpatient Prescriptions   Medication Sig Dispense Refill     acetaminophen (TYLENOL) 500 MG tablet Take 500 mg by mouth every 6 (six) hours as needed for pain.       carvedilol (COREG) 12.5 MG tablet Take 1.5 tablets twice daily. 270 tablet 3     docusate sodium (COLACE) 100 MG capsule Take 100 mg by mouth daily as needed for constipation.       hydrALAZINE (APRESOLINE) 10 MG tablet Take 1 tablet (10 mg total) by mouth 4 (four) times a day as needed. 360 tablet 3     levothyroxine (SYNTHROID, LEVOTHROID) 125 MCG tablet Take 1 tablet (125 mcg total) by mouth daily. 90 tablet 2     losartan-hydrochlorothiazide (HYZAAR) 100-12.5 mg per tablet TAKE ONE TABLET BY MOUTH ONCE DAILY 90 tablet 0     multivitamin therapeutic (THERAGRAN) tablet Take 0.5 tablets by mouth daily.        omeprazole (PRILOSEC) 20 MG capsule TAKE ONE CAPSULE BY MOUTH TWICE DAILY 180 capsule 0     polyethylene glycol (GLYCOLAX) 17 gram/dose powder Take 17 g by mouth daily. Mix with water 255 g 0     PROPYLENE GLYCOL//PF (SYSTANE, PF, OPHT) Administer 1 drop to both eyes 4 (four) times a day as needed.        sertraline (ZOLOFT) 50 MG tablet Take 1 tablet (50 mg total) by mouth daily. 90 tablet 3     triamcinolone (KENALOG) 0.1 % ointment APPLY AND RUB IN A THIN FILM TO AFFECTED AREAS TWICE DAILY.(AM AND PM), for no more than two weeks 30 g 1     No current facility-administered medications for this visit.      Allergies: Penicillins; Atenolol; Atorvastatin; Codeine; Ibuprofen; Lovastatin; and Cephalexin   No LMP recorded. Patient has had a hysterectomy.    HPI:   Pleasant 77-year-old here for a long history of left shoulder pain.  At least a 5 year history of left shoulder  pain off and on.  She recalls having physical therapy 5 years ago, not certain if that helped.  She has not had any surgeries or procedures.  Over the last few months has been bothered more frequently by left shoulder pain.  Reports decreased range of motion secondary to pain.  Is difficult to get dressed, put her bra on, comb her hair.  States she hears popping and grinding when moving her shoulder.    She is currently doing physical therapy of abdomen rehab for her back and hip.  She expects that to be for 2 weeks.    ROS: as per HPI    OBJECTIVE:   Visit Vitals     /76     Pulse 68     Temp 97.7  F (36.5  C) (Oral)     Resp 16     Wt 170 lb 6.4 oz (77.3 kg)     BMI 27.5 kg/m2       General: Pleasant, well-appearing, in no acute distress.  Musculoskeletal: Exam of the left shoulder reveals no swelling, warmth, or erythema.  Range of motion decreased secondary to pain, specifically elevation beyond about 90  and abduction beyond about 110 .  Unable to reach behind her back with the left arm.  Supraspinatus muscle testing elicits pain.  Other rotator muscle cuff testing does not elicit as much pain.  Positive Oliver sign of impingement.      ASSESSMENT/PLAN  1. Chronic left shoulder pain  Worsening pain recently and decreased range of motion.  Recommend x-ray and physical therapy.  If not improving with physical therapy, then orthopedics consult.  She would like to wait 2 weeks until her other physical therapy sessions are completed for her hip and back.  - Ambulatory referral to Physical Therapy  - XR Shoulder Left 2 or More VWS; Future

## 2021-06-09 NOTE — PROGRESS NOTES
[facility] Daily Progress     Patient Name: Mehreen Camara  Date: 3/27/2017  Visit #: 4/6 - [facility]   PTA visit #: 1   Referral Diagnosis: Low back pain, hip pain  Referring provider: Danni Ortiz MD Dr. Jennifer Kendall Thomas   Visit Diagnosis:     ICD-10-CM    1. Sacral pain M53.3    2. Acute pain of left hip M25.552    3. Generalized muscle weakness M62.81    4. Chronic bilateral low back pain with right-sided sciatica M54.41     G89.29          Assessment:   Patient reports no changes in pain since the start of physical therapy. She continues to present with antalgic gait and decreased L stance time, guarded transitions. She was very tender with palpation of L iliopsoas today. Patient is unable to tolerate hip stretching, and did not tolerate gentle hip joint mobilizations due to increased pain. Unable to progress core strengthening due to increased pain. [facility], as she has not found relief from any physical therapy interventions thus far. Discussed patient's gastro-intestinal issues of feeling bloated/gas and constipation as a potential cause of pain fluctuation in lower back/hip.    Goal Status:  Pt. will be independent with home exercise program in : 2 weeks  Pt will: walk indoors/outdoors for >10 minutes with pain <3/10 on NPRS to progress towards returning to exercise in 6 visits   Pt will: roll supine<>sidelying without increased L sided pain in order to improve sleep in 6 visits   Pt will: bend over to tie shoes in sitting with pain <3/10 on NPRS to asisst with dressing in 6 visits  Pt will: tolerate stand for >10 minutes without seated rest in order to assist with independent meal preparation in 6 visits     Plan / Patient Education:     Continue with initial plan of care.  Progress with home program as tolerated.  Continue with manual therapy as tolerated and gentle core/hip strengthening as tolerated  Subjective:        Pain Ratin  Improved pain since last visit, however patient feels mnimal change in L hip pain since evaluation. Patient recently saw her PCP regarding shoulder pain and will be seeing physical therapy next week for shoulder evaluation.    Objective:     Pain with palpation of L iliopsoas     Treatment Today     TREATMENT MINUTES COMMENTS   Evaluation     Self-care/ Home management     Manual therapy 10 L Iliopsoas release x 45 second hold x 5 reps total, proximal and distally. Tenderness present with this release.    Neuromuscular Re-education     Therapeutic Activity     Therapeutic Exercises 15 Performed:    - TrA sets x 10 reps x 10 second hold   - Pt did not tolerate bridge due to increased pain   - TrA set with hip abduction isometric x 5 seconds x 10 reps  - TrA set with hip adduction isometric x 5 seconds x 10 reps  - Attempted TrA sets with slow march, but pt did not tolerate due to increased L anterior hip pain       Gait training     Modality__________________                Total 25    Blank areas are intentional and mean the treatment did not include these items.       Jennifer Diop  3/27/2017

## 2021-06-09 NOTE — PROGRESS NOTES
Please call patient:    MRI result has come back, showing some disc disease but overall not much change from previous MRI obtained in 2016. Please see the spine specialist for further management including possible steroid injection of the back.   If you have any questions please let me know    Dr. Brice.

## 2021-06-09 NOTE — PROGRESS NOTES
SUBJECTIVE   Mehreen Cmaara is a 77 y.o. old female with a past medical history of labile hypertension, osteoarthritis, and hypothyroidism who presented to clinic today for further evaluation of acute back and hip pain.  Her daughter is present with her today throughout the encounter. She has always had back problem but this episode is the most severe. Started about 4 days ago.  She woke up with incredible pain in her back and hip and could not get out of bed. Patient denies any injury or trauma to the area.  Pain is located in left hip, described as sharp exacerbated with movement, radiates to her outer left thigh sometimes past the left knee. She also notices numbness and tingling in her left toes, especially if she puts her left leg up on a chair to rest. Before the pain came on, she admits to doing a lot of bending over. Now she can hardly bend over at all because of pain. She denies any weakness in her left leg.Tylenol didn't help. She had some tramadol tablets at home and did take a couple of them and reports that they helped her tremendously. Denies stool incontinence. Does have chronic urinary incontinence. Denies perineal numbness. Denies fever/chills.       Medical History  Active Ambulatory (Non-Hospital) Problems    Diagnosis     S/P AAA repair using bifurcation graft     Adrenal adenoma     Acute encephalopathy     Hypertensive urgency     Hypertension     Tinnitus Of Both Ears     Retinal Migraine Headache     Menopause Has Occurred     Dizziness     Obesity     Osteoarthritis     Cataract     Esophageal Reflux     Hypothyroidism     Dyslipidemia     Chronic Major Depression     Hypertension     Aneurysm Of The Abdominal Aorta     Harris's Esophagus     Osteopenia     Rotator Cuff Tendonitis     Past Medical History:   Diagnosis Date     AAA (abdominal aortic aneurysm)      Adrenal adenoma      Harris esophagus      Constipation      Depression      GERD (gastroesophageal reflux disease)       "Hiatal hernia      HLD (hyperlipidemia)      HTN (hypertension)      Hypothyroid      Murmur      Osteoarthritis      Scoliosis      Thyroid nodule        Surgical History  She  has a past surgical history that includes thyroidectomy; total hip arthroplasty (Right); total abdom hysterectomy (1985); revise median n/carpal tunnel surg; Joint replacement; arthrodesis ant interbody min discectomy, cervical below c2; and Hysterectomy (1979).    Social History  Reviewed, and she  reports that she quit smoking about 22 years ago. Her smoking use included Cigarettes. She has never used smokeless tobacco. She reports that she does not drink alcohol or use illicit drugs.    Family History  Reviewed, and family history includes Cancer (age of onset: 57) in her brother; Heart disease in her father, mother, paternal aunt, and paternal uncle; Hypertension in her daughter, daughter, and mother.    Medications  Reviewed and reconciled    Allergies  Allergies   Allergen Reactions     Penicillins Anaphylaxis, Shortness Of Breath and Rash     Atenolol      cough     Atorvastatin Myalgia     Codeine Nausea And Vomiting     Ibuprofen Other (See Comments)     Stomach upset     Lovastatin Myalgia     Cephalexin Rash     Neck reddness         OBJECTIVE  Physical Exam:  Vital signs:   Visit Vitals     BP (!) 184/86     Pulse (!) 56     Resp 16     Ht 5' 6\" (1.676 m)     Wt 172 lb (78 kg)     BMI 27.76 kg/m2     Weight: 172 lb (78 kg)    General appearance: pleasant, appears stated age, cooperative and in no distress  Lymph: no cervical/supraclavicular adenopathy  Respiratory: clear to auscultation bilaterally, good air movement throughout, no wheezing or crackles, speaking full sentences without difficulty  Cardiovascular: regular rate and rhythm, no murmur appreciated, no leg edema  Musculoskeletal: warm and well perfused, strong and symmetric dorsalis pedal pulses, strength 5/5 and equal bilaterally, left sided SI joint tender to " palpation. No midline spinal tenderness.   Skin: no rashes  Neuro: alert oriented x 3, grossly normal otherwise, patellar reflexes normal and equal bilaterally, straight leg raise positive on the left side and performing straight leg raise on the right leg caused symptoms to her left side   Psych: anxious affect, appropriate conversation     ASSESSMENT/ PLAN    1. Left lumbar radiculopathy  77 year old female with past medical history of osteoarthritis here with acute onset of symptoms are consistent with lumbar radicular pain. Reviewed MRI from 10/2016 ordered during an emergency room visit which showed mild foraminal narrowing involving L2 to L5 but otherwise negative. Will obtain MRI for further work up. Will give her limited amount of Tramadol and advised patient to take Tylenol scheduled 1000 mg twice daily to help with her pain.  Advise continuing with ice or heat pad to help with her pain. Will also send her to spine clinic for further assistance and also physical therapy. Advised patient to go make appointment with Spine after she obtains her MRI. Patient verbalized understanding.   - MR Lumbar Spine Without Contrast; Future  - Ambulatory referral to [facility]  - traMADol (ULTRAM) 50 mg tablet; Take 1 tablet (50 mg total) by mouth every 8 (eight) hours as needed for pain.  Dispense: 20 tablet; Refill: 0       Roxane Brice MD

## 2021-06-09 NOTE — PROGRESS NOTES
Assessment:   Mehreen Camara is a 77 y.o. y.o. female with past medical history significant for hypertension, hyperlipidemia, hypothyroidism, GERD, depression who presents today for follow-up regarding acute flare of chronic low back pain.  Her pain is at the SI joint dysfunction etiology.  The patient has been going to physical therapy but she has not noticed any significant relief of her pain since that time.  Lumbar facet syndrome is also in the differential diagnosis, given that her MRI of her lumbar spine did show significant lumbar spondylosis.  However her provocative maneuvers are most consistent with SI joint dysfunction.  She does have some radiation going into the buttocks but not distal to that.  She continues to have left groin pain, which may be left hip pain.  Her x-ray of the left hip did not show any significant degenerative changes.       Plan:     A shared decision making plan was used.  The patient's values and choices were respected.  The following represents what was discussed and decided upon by the physician and the patient.      1.  DIAGNOSTIC TESTS:  X-rays of the left hip were personally reviewed today.  MRI of the lumbar spine was also personally reviewed today.  No further diagnostic imaging testing is necessary at this time.  2.  PHYSICAL THERAPY: The patient should continue with physical therapy.  It is thought that after she has her sacroiliac joint injections that she will start to notice improvement with doing the physical therapy exercises.  Physical therapy should focus on left hip mobilization in addition to the previous orders and her therapist was notified via Epic messaging today.    3.  MEDICATIONS:  No changes to her medications today.  She can continue to take Tylenol 500 mg as needed for pain.  She should not exceed 6 tablets in a 24-hour period.    4.  INTERVENTIONS:  Discussed bilateral sacroiliac joint injections with the patient.  Discussed the risks and benefits  of corticosteroid injections.  Discussed as long as she is not having greater than 4 injections in a year, that there is no significant risk.  The patient was told that she could continue with physical therapy if she did not want to do the injections, but the patient wanted to move forward with the injections.  An order was placed and she can proceed at her convenience.  She was told that she will need a  for the procedure.    - If she continues to have groin pain, a left hip joint injection could be performed.  She was offered the left hip joint injection prior to the SI joint injections, but she states that the back pain is worse than the groin pain, and wanted to proceed with the SI joint injections first.    5.  PATIENT EDUCATION:  All the patient's questions were answered to her satisfaction today.    - She does have some left arm/shoulder pain, but this is being managed by her primary care physician and she does have an order to start physical therapy for the shoulder.  6.  FOLLOW-UP: The patient can follow-up for the injections first available.  If she has any questions or concerns she is encouraged to call the clinic.    Subjective:     Mehreen Camara is a 77 y.o. female who presents today for follow-up regarding continued low back pain.  The patient was last seen in our clinic on March 13, 2017.  At that time she was referred for physical therapy.  The patient reports that she has gone about 4 times.  She has not noticed any significant improvement in her back pain.  She says her physical therapist recommended that she follow-up here since she is not making any improvement.  The patient continues to hurt across the low back at the level of the SI joints.  She gets pain radiating into the bilateral buttocks.  On the right side is good on the anterior aspect of the thigh and stops about two thirds of the way to the knee.  On the left side she does get pain going into the left groin.  She does not  have right groin pain.  Her pain seems to be worse if she sits, stands, or walks for too long.  Bending also aggravate the pain.  She is rating her pain at a 5 or 6 out of 10.  At best it is a 4 out of 10.  She denies any significant changes in her symptoms since the last time.  No new symptoms today.    Past medical history is reviewed and is unchanged for any new medical diagnoses in the interim.      Family history is reviewed and is unchanged in the interim.      Review of Systems:  Positive for numbness and tingling in her feet as well as for mild bladder incontinence that is chronic.  Positive for sensation of fatigue/tiredness (she calls this weakness) positive for dizziness related to her hypertension medications.  She denies any bowel dysfunction, foot drop, headaches, nausea/vomiting, blurred vision, balance changes.     Objective:   CONSTITUTIONAL:  Vital signs as above.  No acute distress.  The patient is well nourished and well groomed.    PSYCHIATRIC:  The patient is awake, alert, oriented to person, place and time.  The patient is answering questions appropriately with clear speech.  Normal affect.  SKIN:  Skin over the face, posterior torso, bilateral upper and lower extremities is clean, dry, intact without rashes.  MUSCULOSKELETAL:  Gait is non-antalgic.  The patient is able to heel and toe walk without any difficulty.  Mild tenderness over the bilateral lower lumbar paraspinal muscles and exquisite tenderness over the SI joints bilaterally..      The patient has 5/5 strength for the bilateral hip flexors, knee flexors/extensors, ankle dorsiflexors/plantar flexors, ankle evertors/invertors.  The patient has pain with bilateral hip internal rotation.  The Fabere's test on the left does reproduce pain in the left groin.  Fabere's test on the right does cause localized left low back pain to the SI joint.  NEUROLOGICAL:  Trace patellar, medial hamstring, achilles reflexes which are symmetric  bilaterally.  No ankle clonus bilaterally.  Sensation to light touch is intact in the bilateral L4, L5, and S1 dermatomes.       RESULTS: MRI of the lumbar spine was personally reviewed today.  She has degenerative changes seen at the L3-4, L4-5, L5-S1 levels, particularly with facet arthropathy.  The x-ray of the hip does not show significant degenerative changes.  Please see the reports for details.

## 2021-06-09 NOTE — PROGRESS NOTES
Assessment:   Mehreen Camara is a 77 y.o. y.o. female with past medical history significant for hypertension, hyperlipidemia, hypothyroidism, GERD, depression who presents today for follow-up regarding acute flare of chronic left-sided low back pain without radicular/sciatic type symptoms.  He shows symptoms have been present for about 1-2 weeks.  The patient's pain is likely from SI joint dysfunction.  Lumbar facet syndrome is also in the differential diagnosis given her positive facet provocative maneuvers and her lumbar spondylosis seen on her MRI.  She also likely has some contribution of pain from left hip osteoarthritis.  She is neurologically intact and without any red flag symptoms.       Plan:     A shared decision making plan was used.  The patient's values and choices were respected.  The following represents what was discussed and decided upon by the physician and the patient.      1.  DIAGNOSTIC TESTS:  The patient's MRI performed through [facility] on March 7, 2017 was personally reviewed today.  She has no significant changes in the interim.  - X-ray of the left hip is ordered today.  2.  PHYSICAL THERAPY: An order was provided today for physical therapy to work on left hip mobilization as well as SI mobilization and self correction exercises.  3.  MEDICATIONS:  No changes to her medications at this time.  The patient can continue to take over-the-counter Tylenol as needed.  She can take Tylenol 500 mg 1 or 2 tablets up to 3 times a day.  She should not exceed 6 tablets in a 24-hour period.  She does have tramadol from her primary care physician that she is encouraged to take only when the pain is very severe.    4.  INTERVENTIONS:  The patient that if her pain does not improve with starting physical therapy, the left sacroiliac joint injection could be performed.  Recommend that she try physical therapy before proceeding with an injection and she was in agreement with this plan.    5.  PATIENT  EDUCATION:    - The patient was shown the pelvic tilt exercises and the innominate of correction exercises.  She is asked to do these at least twice a day until she can start physical therapy.  She is encouraged to continue doing her other exercises from physical therapy on a regular basis.  - All the patient's questions were answered to her satisfaction today.  She was in agreement with the treatment plan.  6.  FOLLOW-UP: The patient will be contacted in 2 weeks.  If she has any questions or concerns before that time she is encouraged to call the clinic.    Subjective:     Mehreen Camara is a 77 y.o. female who presents today for follow-up regarding acute flare of left-sided low back pain.  The patient reports that the pain came on suddenly about a week or 2 ago.  She reports that she has never had pain in her left hip area (points to the left lateral buttock and left SI joint.  This is very concerning to her.  The pain was so severe that she did see her primary care physician who ordered an updated MRI of her low back.  She was told that there are some mild changes since her last MRI a year ago.  She is also referred here to the spine clinic.    The patient states that her pain seems to start in the lateral hip and then it wraps into the left groin and then goes back to the left low back.  She can get some pain going into the left buttock, but it does not go distal to there.  She denies any numbness or tingling or any weakness on the left side.  She does have some right thigh pain, but she reports that this is secondary to her vascular stents and she does not want to focus on that pain today.  It is a left-sided pain that is most concerning to her.  This pain makes it very hard for her to walk or for her to  one spot.  If she sits down and rests this can help.  She has been taking tramadol half tablet per day.  This does not help very much and she feels like it makes her listless.  She has also been  taking Tylenol 500 mg approximately 3 times a day.    Past medical history is reviewed and is unchanged for any new medical diagnoses in the interim.      Family history is reviewed and is unchanged in the interim.      Review of Systems:  Positive for chronic bladder incontinence, blurred vision.  Negative for any numbness or tingling, footdrop, weakness, headache, nausea/vomiting, balance changes.     Objective:   CONSTITUTIONAL:  Vital signs as above.  No acute distress.  The patient is well nourished and well groomed.    PSYCHIATRIC:  The patient is awake, alert, oriented to person, place and time.  The patient is answering questions appropriately with clear speech.  Normal affect.  SKIN:  Skin over the face, posterior torso, bilateral upper and lower extremities is clean, dry, intact without rashes.  MUSCULOSKELETAL:  Gait is non-antalgic.  The patient is able to heel and toe walk though she has some mild imbalance with walking on her heels.  She has significant tenderness over the left lower lumbar paraspinal muscles, and exquisite tenderness over the left SI joint.  She has significant tenderness over the left gluteal muscle and over the left greater trochanteric bursa.  She does not have tenderness over the structures on the right side.     The patient has 5/5 strength for the bilateral hip flexors, knee flexors/extensors, ankle dorsiflexors/plantar flexors, ankle evertors/invertors.  The patient has severely restricted range of motion of the lumbar spine with flexion, extension, side bending, upper body trunk rotation.  She reports pain with all of these motions.  She has significant pain with lumbar facet loading (simultaneous extension rotation).  Fabere's test on the left does reproduce left lateral hip pain and low back pain.  She does have very restricted range of motion of the left hip with internal/external rotation.  NEUROLOGICAL:  Trace patellar, medial hamstring, achilles reflexes which are  symmetric bilaterally.  No ankle clonus bilaterally.  Sensation to light touch is mildly impaired in the right L4 dermatome compared to this dermatome on the left side.  Sensation light touch is intact in the bilateral L5 and S1 dermatomes.     RESULTS:  Patient's MRI from March 7, 2017 (performed at [facility]) was reviewed today.  The patient has scoliosis which causes significant disc degeneration lumbar facet arthropathy at multiple levels in the lower lumbar spine.  Please refer to the report for details.

## 2021-06-09 NOTE — PROGRESS NOTES
[facility]   Shoulder Initial Evaluation    Patient Name: Mehreen Camara  Date of evaluation: 4/5/2017  Referral Diagnosis: Shoulder pain   Referring provider: Danni Ortiz MD  Visit Diagnosis:     ICD-10-CM    1. Chronic left shoulder pain M25.512     G89.29    2. Decreased ROM of left shoulder M25.612    3. Generalized muscle weakness M62.81        Assessment:     Mehreen Camara is a 77 y.o. female who presents to therapy today with chief complaints of chronic left shoulder pain. Pain started approximately 5 years ago without injury and is located anterior and lateral shoulder without radiation. Patient feels her pain has been worsening over the last 2 months. She notices increasing difficulty with activities such as dressing, reaching, and is unable to sleep on her left side due to pain. Evaluation reveals: severely restricted L shoulder AROM, strength deficits, postural deficits, + impingement testing. Patient's s/s consistent with rotator cuff pathology with a component of shoulder OA. Pt is also currently being seen in physical therapy for lower back and hip pain. POC and goals have been updated accordingly. Patient will benefit from 1:1 skilled physical therapy services to address the above limitations.     Updated Goals:  Pt will decrease SPADI by > 9 points for significant change in 12 visits   Pt will report pain with dressing <5/10 on NPRS in 12 visits  Pt will reach overhead with L UE with pain <5/10 on NPRS in 12 visits  Pt will demonstrate > 100 degrees L shoulder flexion to improve ADL function in 12 visits   Pt. will be independent with home exercise program in : 2 weeks  Pt will: walk indoors/outdoors for >10 minutes with pain <3/10 on NPRS to progress towards returning to exercise in 12 visits   Pt will: roll supine<>sidelying without increased L sided pain in order to improve sleep in 12 visits   Pt will: bend over to tie shoes in sitting with pain <3/10 on NPRS to  asisst with dressing in 12 visits  Pt will: tolerate stand for >10 minutes without seated rest in order to assist with independent meal preparation in 12 visits     Patient's expectations/goals are realistic.    Barriers to Learning or Achieving Goals:  No Barriers.       Plan / Patient Instructions:        Plan of Care:   Authorization / Certification Start Date: 03/15/17  Authorization / Certification Number of Visits: up to 12 visits (for shoulder and LBP)  Communication with: Referral Source  Patient Related Instruction: Nature of Condition;Treatment plan and rationale;Next steps;Expected outcome  Times per Week: 1x/week  Number of Weeks: up to 12 visits  Number of Visits: up to 12 visits   Therapeutic Exercise: ROM;Stretching;Strengthening  Neuromuscular Reeducation: kinesio tape;posture;balance/proprioception;TNE;core  Manual Therapy: soft tissue mobilization;myofascial release;joint mobilization;muscle energy  Modalities: hot pack;cold pack    POC and pathology of condition were reviewed with patient.  Pt. is in agreement with the Plan of Care  A Home Exercise Program (HEP) was initiated today.  Pt. was instructed in exercises by PT and patient was given a handout with detailed instructions.  Plan for next visit: progress posture and shoulder strengthening   .   Subjective:       Mehreen Camara is a 78 y/o female who presents today with chief c/o L shoulder pain. Pain started approximately 5 years ago without injury. Located anterior and lateral shoulder. Pain rarely radiates into arm, no numbness/tingling reported. Pain has fluctuated over the last 5 years, but pain has been worsening in the last 2 months. Pt notices more difficulty and pain with reaching the back of her head and hooking her bra. She is unable to lay on her left side, don/doff her jacket, and uses grabbers to help reach high cupboards. She is unable to lift with both arms due to this pain.     Patient goals: improve reaching, dressing,  and lying on left side     X-ray taken on 3/21:  XR SHOULDER LEFT 2 OR MORE VW 3:14 PMINDICATION: Left shoulder painCOMPARISON: None.FINDINGS: Moderately severe degenerative changes of the left glenohumeral joint with joint space narrowing and marginal osteophyte formation. Moderately severe   degenerative change changes also affect the acromioclavicular joint. No acute fracture or dislocation.    Social information: See LBP evaluation  Pain Ratin at rest  Pain rating at best: 0  Pain rating at worst: 10  Pain description: aching and sharp    Patient reports no benefit from  rest  , pain medication       Objective:      Note: Items left blank indicates the item was not performed or not indicated at the time of the evaluation.    Patient Outcome Measures :    Shoulder Pain and Disability Index (SPADI) in %: 88.5   Scores range from 0-100%, where a score of 0% represents minimal pain and maximal function. The minimal clincically important difference is a score reduction of 10%.    Shoulder Examination  1. Chronic left shoulder pain     2. Decreased ROM of left shoulder     3. Generalized muscle weakness         Precautions/Restrictions: osteopenia  Involved side: Left    Posture Observation:   Standing: L scapula adducted, slightly winging B, significant scoliosis and upper back kyphosis, stands with R LE unweighted    Cervical Clearing:  Restrictions throughout gross ROM flexion/rotation/extension      Shoulder/Elbow ROM    Date:      Shoulder and Elbow ROM ( )   AROM in degrees AROM in degrees AROM in degrees    Right Left Right Left Right Left   Shoulder Flexion (0-180 ) 140 69       Shoulder Abduction (0-180 ) 95 59       Shoulder Extension (0-60 )         Shoulder ER (0-90 ) To T1 To approx C3       Shoulder IR (0-70 ) To approx  T12 To approx L5/S1       Shoulder IR/Ext         Elbow Flexion (150 )         Elbow Extension (0 )          PROM in degrees PROM in degrees PROM in degrees    Right Left  Right Left Right Left   Shoulder Flexion (0-180 )        Shoulder Abduction (0-180 )        Shoulder Extension (0-60 )         Shoulder ER (0-90 )         Shoulder IR (0-70 )         Elbow Flexion (150 )         Elbow Extension (0 )             Scapular mechanics:       Shoulder/Elbow Strength   Date:      Shoulder/Elbow Strength (/5)  Manual Muscle Test (MMT) MMT MMT MMT    Right Left Right Left Right Left   Shoulder Abduction 5 5 sore       Supraspinatus 4 4- pain       Infraspinatus 4+ 4 pain       Shoulder Flexion         Shoulder Extension         Shoulder External Rotation 5 4- pain       Shoulder Internal Rotation 5 4 pain       Elbow Flexion         Elbow Extension         Other:         Other:           Joint Assessment:  Glenohumeral Joint Assessment: NT  Inferior glide:  Posterior glide:    SC joint: impaired L inferior > posterior glide     Flexibility:     Shoulder Special Tests     Impingement Cluster Right (+/-) Left (+/-) Rotator Cuff Tests Right (+/-) Left (+/-)   Benito-Jas  + Drop Arm Sign     Painful Arc  + Hornblowers     Neer's Impingement    ERLS     Ned's   IRLS     Infraspinatus Test/ER isometric   +      AC Tests Right (+/-) Left (+/-) Labral Tests Right (+/-) Left (+/-)   Active Compression   Biceps Load Test II     Crossbody Adduction   Jerk Test     AC Resisted Extension   Sarah Beth Test     GH Instability Tests Right (+/-) Left (+/-) Biceps Tests Right (+/-) Left (+/-)   Sulcus Sign   Speed     Apprehension   Magy s     Relocation   Other:     Other:   Other:     Other:          Palpation:   + L infraspinatus tenderness  + acromion and supraspinatus  + biceps tendon at groove on L      Treatment Today    TREATMENT MINUTES COMMENTS   Evaluation 40 Shoulder evaluation    Self-care/ Home management     Manual therapy     Neuromuscular Re-education     Therapeutic Activity     Therapeutic Exercises 10 Discussed findings from evaluation. Used a picture of the shoulder to  discuss anatomy and pathology including arthritis and rotator cuff symptoms.    Initiated HEP for shoulder:   Pec stretch in corner x 30 seconds x 3 reps  Scap retraction in sitting x 5 seconds x 10 reps   Gait training     Modality__________________                Total 50    Blank areas are intentional and mean the treatment did not include these items.       PT Evaluation Code: (Please list factors)  Patient History/Comorbidities: AAA with stent placement hypertension, hyperlipidemia, hypothyroidism, GERD, depression, osteopenia, R KRISHNA 2003  Examination: ROM, strength, + L shoulder pain, + L shoulder weakness  Clinical Presentation: stable  Clinical Decision Making: low     Patient History/  Comorbidities Examination  (body structures and functions, activity limitations, and/or participation restrictions) Clinical Presentation Clinical Decision Making (Complexity)   No documented Comorbidities or personal factors 1-2 Elements Stable and/or uncomplicated Low   1-2 documented comorbidities or personal factor 3 Elements Evolving clinical presentation with changing characteristics Moderate   3-4 documented comorbidities or personal factors 4 or more Unstable and unpredictable High             Jennifer Diop  4/5/2017  10:42 AM

## 2021-06-09 NOTE — PROGRESS NOTES
MTM Encounter    Assessment/Plan  Hypertension: Despite elevated BP this morning in clinic, overall Mehreen's BP readings look better than they have for a long time.  I encouraged her to keep doing what she is doing and follow up with [facility] in May.  I did advise her to seek out care in clinic sooner if she is noting frequent elevations in the 180s or higher or if she becomes symptomatically hypotensive again.  - Continue current therapy  - reduce BP checks to every other day.    Osteoarthritis: Pain has been acutely worse.  I encouraged her to seek out evaluation by MD and for possible refill of tramadol which has been effective for her pain over the weekend. I advised her to continue using Tylenol whenever possible for pain, but to use tramadol when this is not effective for her.  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  - appt scheduled with Dr. Brice today for evaluation    Follow Up  Six months, sooner if BP elevated >180 consistently.    Subjective/Objective  Mehreen Camara is a 77 y.o. female here for a medication therapy management (MTM) appointment; her chief concern today is hypertension follow up.    Hypertension: She has continued to check her blood pressure 3-4 times daily. Has only used hydralazine once in the past week; typically using 1-2 per week.  SBP has ranged from , DBP has ranged from 59-93.  No symptoms of hypotension.  BP cuff has previously been validated.  Her BP seems to be very anxiety dependent.  She will be seeing endocrinology in May.    Osteoarthritis:  Mehreen woke up Friday in so much pain she couldn't move.  She is having very bad pain in her hip and joint.  Tylenol did not touch it.  She had some tramadol left from a previous prescription.  9 tablets, now down to 2 tablets.  Her blood pressure has been better since this started happening  ----------------    Mehreen's medication list was reviewed with  them, discussing reason for use, directions for use, and potential side effects of each medication as needed. Indication, safety, efficacy, and convenience was assessed for all medications addressed above.  No environmental factors were noted currently affecting patient.  This care plan was communicated via EMR with her primary care provider, Danni Ortiz MD, who is the authorizing prescriber for this visit.  Direct supervision was available by either the patient's PCP or other available provider.  Time and complexity billing metrics are included in the docflowsheet linked to this visit.  Time spent: 20 minutes      Rian Tapia, PharmD  [facility] Pharmacist  [facility]  [phone]

## 2021-06-09 NOTE — PROGRESS NOTES
[facility]   [place]-Pelvic Initial Evaluation    Patient Name: Mehreen Camara  Date of evaluation: 3/15/2017  Referral Diagnosis: Low back pain, hip pain, sacroiliac dysfunction of L side   Referring provider: Maurilio Chapin*  Visit Diagnosis:     ICD-10-CM    1. Sacral pain M53.3    2. Acute pain of left hip M25.552    3. Generalized muscle weakness M62.81        Assessment:   Mehreen Camara is a 77 y.o. female who presents to therapy today with chief complaints of left sided lower back and hip pain. Onset date of sx was February 25th 2016 without injury. Patient recalls waking up in the morning with intense groin and lower back pain. Patient has been seen in physical therapy before, with most recent PT visit 11/2016 for chronic lower back pain. Pt recently had updated MRI and hip x-ray taken (see imaging report). Pain is described as constant, located in L PSIS area with radiation to anterior and lateral hip. Pain is aggravated by bending over, standing >5 minutes, dressing, rolling over in bed. Pt lives alone and is limited in ADL's and is unable to exercise with walking due to pain. Evaluation reveals: severely restricted lumbar AROM, + 2/4 sacral provocation tests, + L hip PROM restrictions and pain, antalgic gait and transfers. Unable to fully assess hip and SI provocation testing due patient's severity of pain and mm guarding. Further testing will be needed in future. Patient will benefit from 1:1 skilled physical therapy services to address the above limitations.       Goals:  Pt. will be independent with home exercise program in : 2 weeks  Pt will: walk indoors/outdoors for >10 minutes with pain <3/10 on NPRS to progress towards returning to exercise in 6 visits   Pt will: roll supine<>sidelying without increased L sided pain in order to improve sleep in 6 visits   Pt will: bend over to tie shoes in sitting with pain <3/10 on NPRS to asisst with dressing in 6 visits  Pt will:  tolerate stand for >10 minutes without seated rest in order to assist with independent meal preparation in 6 visits     Patient's expectations/goals are realistic.    Barriers to Learning or Achieving Goals:  No Barriers.       Plan / Patient Instructions:        Plan of Care:   Authorization / Certification Start Date: 03/15/17  Authorization / Certification Number of Visits: up to 4-6 visits  Communication with: Referral Source  Patient Related Instruction: Nature of Condition;Treatment plan and rationale;Next steps;Expected outcome  Times per Week: 1x/week  Number of Weeks: up to 4-6 visits  Number of Visits: up to 4-6 visits  Therapeutic Exercise: ROM;Stretching;Strengthening  Neuromuscular Reeducation: kinesio tape;posture;balance/proprioception;TNE;core  Manual Therapy: soft tissue mobilization;myofascial release;joint mobilization;muscle energy  Modalities: hot pack;cold pack    Plan for next visit: initiate HEP if tolerated, consider hip mobilization vs MET vs STM/MFR     Subjective:         Mehreen Balbuena is a 78 y/o female who presents today with c/o L SI pain and hip pain.Patient has a history of chronic back pain, but felt increase in L sided back/hip pain on February 25th without injury. Patient reports she felt pain in left groin with radiation to her low back area. She has seen physical therapy before, with most recent treatment 11/2016. She estimates she has been performing HEP consistently 3x/week since she last finished physical therapy. Pain is located in L PSIS area with radiation to anterior and lateral hip. Pain is aggravated by bending over, standing >5 minutes, dressing, rolling over in bed. Occasional numbness/tingling in L toes most pronounced at night time.    Patient goals: return to walking for exercise (walk > 20 minutes outdoors).     PMH: AAA with stent placement hypertension, hyperlipidemia, hypothyroidism, GERD, depression, osteopenia, R KRISHNA 2003      Social information:   Living  Situation:townhouse and lives alone.   Occupation:retired    Pain Ratin  Pain rating at best: 6  Pain rating at worst: 10  Pain description: aching, sharp and shooting    Patient reports benefit from:  Tylenol & heat         Objective:      Note: Items left blank indicates the item was not performed or not indicated at the time of the evaluation.    Patient Outcome Measures :    Modified Oswestry Low Back Pain Disablity Questionnaire  in %: 58   Scores range from 0-100%, where a score of 0% represents minimal pain and maximal function. The minimal clinically important difference is a score reduction of 12%.    Examination  1. Sacral pain     2. Acute pain of left hip     3. Generalized muscle weakness         Precautions/Restrictions: see PMH above.  Involved side: Left    Posture Observation:   Standing: R PSIS tenderness, L convex scoliosis, pt overall laterally shifted to the L, L PSIS low  Sitting: sits with significant forward head and rounded shoulders       Lumbar ROM:    Date:      *Indicate scale AROM AROM AROM   Lumbar Flexion 75% loss, pt guarded, + L FB     Lumbar Extension Extends to neutral position, no increased pain      Right Left Right Left Right Left   Lumbar Sidebending NT due to pain NT due to pain       Lumbar Rotation 50% loss no increased pain  60% loss increased L PSIS pain       Thoracic Flexion      Thoracic Extension      Thoracic Sidebending         Thoracic Rotation           Lower Extremity Strength:     Date:      LE strength/5 Right Left Right Left Right Left   Hip Flexion (L1-3) 4 4       Hip Extension (L5-S1)         Hip Abduction (L4-5) 4 4       Hip Adduction (L2-3)         Hip External Rotation         Hip Internal Rotation         Knee Extension (L3-4) 5 5       Knee Flexion         Ankle Dorsiflexion (L4-5) 5 5       Great Toe Extension (L5)         Ankle Plantar flexion (S1)         Abdominals        Independent transfers sit<> supine, modified sit>stand with UE  support               Reflex Testing:  Patellar (L3-4): NT  Achilles (S1-2): NT    Sensation:NT      Hip PROM with overpressure:   R hip: flexion, IR/ER WFL, light overpressure applied due to Hx KRISHNA without pain  R SLR: to approx 75 degrees, increased pain with adduction  SILVIA, FADIR not tested    L hip: flexion WFL, no pain with IR at end range or with light overpressure  ER: pain in groin and lateral/posterior L hip with ovepressure  L FADIR and SILVIA: unable to reach test position due to increased L hip pain, pt very guarded      L posterior rotation present  L LLD present > 1 cm           Lumbar Special Tests:    Lumbar Special Tests Right Left SI Tests Right  Left   Quadrant test   SI Compression  NT   Straight leg raise   SI Distraction  +   Crossover response   Thigh Thrust  +   Slump   Sacral Thrust  NT     SILVIA     Trunk extensor endurance test  Resisted Abduction     Prone instability test  Other:         Palpation:   + pain at L greater trochanter, along iliac crest and at glut med  -ve L IT band pain       Repeated Motion Testing:  NT due to pain complaints. Did not attempt transition to prone as pt had moderate difficulty supine>sidelying.     Passive Mobility - Joint Integrity:  PA's: NT due to pain      Treatment Today     TREATMENT MINUTES COMMENTS   Evaluation 30 Lumbar/SI evaluation.    Self-care/ Home management     Manual therapy 25 STM over L greater trochanter, L glut med, along iliac crest. Piriformis release x 45 sec hold x 3 reps. Pillow between pt knees in R sidelying.    Followed with gentle grade I-II L LE long axis distraction without hold with oscillations.  MET isometrics with R glut and L hip flx activation x 6 seconds x 10 reps total.     Patient reports a decrease in pain upon leaving appt today- overall less antalgic gait with ambulation room>lobby.   Neuromuscular Re-education     Therapeutic Activity     Therapeutic Exercises 0 Did not initiate HEP due to patient's pain  complaints and inability to finish evaluation tests/measures.   Discussed holding off on previous HEP exercises until next session.      Gait training     Modality__________________                Total 55    Blank areas are intentional and mean the treatment did not include these items.         PT Evaluation Code: (Please list factors)  Patient History/Comorbidities: AAA with stent placement hypertension, hyperlipidemia, hypothyroidism, GERD, depression, osteopenia, R KRISHNA 2003  Examination: ROM, strength, + SI pain, + L hip provocation, + lumbar spine provocation  Clinical Presentation: stable  Clinical Decision Making: low    Patient History/  Comorbidities Examination  (body structures and functions, activity limitations, and/or participation restrictions) Clinical Presentation Clinical Decision Making (Complexity)   No documented Comorbidities or personal factors 1-2 Elements Stable and/or uncomplicated Low   1-2 documented comorbidities or personal factor 3 Elements Evolving clinical presentation with changing characteristics Moderate   3-4 documented comorbidities or personal factors 4 or more Unstable and unpredictable High              Jennifer Diop  3/15/2017  9:53 AM

## 2021-06-09 NOTE — PROGRESS NOTES
[facility] Daily Progress     Patient Name: Mehreen Camara  Date: 3/20/2017  Visit #:  [facility]  PTA visit #:    Referral Diagnosis: Low back pain, hip pain  Referring provider: Danni Ortiz MD Dr. Jennifer Kendall Thomas   Visit Diagnosis:     ICD-10-CM    1. Sacral pain M53.3    2. Acute pain of left hip M25.552    3. Generalized muscle weakness M62.81          Assessment:   Patient appears to have made some progress since evaluation. Her gait is less antalgic today, and she reports some pain relief following STM/MFR to her hip. Initiated gentle (grade I-II) hip mobilization today, will assess response to this at next visit. Plan to continue manual therapy and gentle core/hip strengthening as tolerated.     Goal Status:  Pt. will be independent with home exercise program in : 2 weeks  Pt will: walk indoors/outdoors for >10 minutes with pain <3/10 on NPRS to progress towards returning to exercise in 6 visits   Pt will: roll supine<>sidelying without increased L sided pain in order to improve sleep in 6 visits   Pt will: bend over to tie shoes in sitting with pain <3/10 on NPRS to asisst with dressing in 6 visits  Pt will: tolerate stand for >10 minutes without seated rest in order to assist with independent meal preparation in 6 visits     Plan / Patient Education:     Continue with initial plan of care.  Progress with home program as tolerated.    Subjective:   Improved hip pain since last visit. Continues to c/o anterior L groin pain that radiates to lateral and posterior hip.    Pain Ratin        Objective:     Less antalgic gait overall. No pain with grade I-II mobilization.  Moderate-strong TrA activation present with exercise today.    Treatment Today     TREATMENT MINUTES COMMENTS   Evaluation     Self-care/ Home management     Manual therapy 12 Gentle grade I-II posterior glide to L hip, pt in supine with L LE on therapist shoulder. No hold, gentle oscillations performed.     Neuromuscular Re-education     Therapeutic Activity     Therapeutic Exercises 14 Initiated HEP:  Exercises:  Exercise #1: Supine hip flexor stretch  Comment #1: HEP x 30 seconds x 2-3, pt with L LE off EOB.  Exercise #2: Abdominal sets  Comment #2: HEP x 10 seconds x 10 reps, cues for breathing.      Gait training     Modality__________________                Total 26    Blank areas are intentional and mean the treatment did not include these items.       Jennifer Diop  3/20/2017

## 2021-06-09 NOTE — PROGRESS NOTES
ASSESSMENT: Onychauxis, foot pain.    PLAN: Toenails were debrided manually and mechanically x9. Return to clinic in nine weeks.         SUBJECTIVE: The patient presents to the [facility] as a new patient. Toenails are long, thick and painful.  She has tried to cut the toenails herself over the past several years, but ends up with bleeding and pain. She denies other injury. No pus. Attempts have been made to remove the left first and second toenails, but they have grown back.     OBJECTIVE:  General: Pleasant 77 y.o. female in no acute distress.  Vascular: DP pulses are absent. PT pulses are absent. Pedal hair is absent. Feet are warm to the touch.  Cardiac: Pulse is not palpable.  Lymphatic: No edema.  Neuro: Sensation in the feet is grossly intact to light touch.  Derm: Toenails are elongated, thickened and dystrophic with discoloration and subungual debris.  The right fourth toenail is surgically absent.  Skin is thin and shiny but intact.   Musculoskeletal: Adequate passive range of motion and foot and ankle joints.     Medical History  Past Medical History:   Diagnosis Date     AAA (abdominal aortic aneurysm)      Adrenal adenoma      Harris esophagus      Constipation      Depression      GERD (gastroesophageal reflux disease)      Hiatal hernia      HLD (hyperlipidemia)      HTN (hypertension)      Hypothyroid      Murmur      Osteoarthritis      Scoliosis      Thyroid nodule     Surgical History   has a past surgical history that includes thyroidectomy; total hip arthroplasty (Right); total abdom hysterectomy (1985); revise median n/carpal tunnel surg; Joint replacement; arthrodesis ant interbody min discectomy, cervical below c2; and Hysterectomy (1979).     Allergies  Allergies   Allergen Reactions     Penicillins Anaphylaxis, Shortness Of Breath and Rash     Atenolol      cough     Atorvastatin Myalgia     Codeine Nausea And Vomiting     Ibuprofen Other (See Comments)     Stomach upset      Lovastatin Myalgia     Cephalexin Rash     Neck reddness    Family History  family history includes Cancer (age of onset: 57) in her brother; Heart disease in her father, mother, paternal aunt, and paternal uncle; Hypertension in her daughter, daughter, and mother.     Medications  Current Outpatient Prescriptions   Medication Sig Dispense Refill     acetaminophen (TYLENOL) 500 MG tablet Take 500 mg by mouth every 6 (six) hours as needed for pain.       carvedilol (COREG) 12.5 MG tablet Take 1.5 tablets twice daily. 270 tablet 3     docusate sodium (COLACE) 100 MG capsule Take 100 mg by mouth daily as needed for constipation.       hydrALAZINE (APRESOLINE) 10 MG tablet Take 1 tablet (10 mg total) by mouth 4 (four) times a day as needed. 360 tablet 3     levothyroxine (SYNTHROID, LEVOTHROID) 125 MCG tablet Take 1 tablet (125 mcg total) by mouth daily. 90 tablet 2     losartan-hydrochlorothiazide (HYZAAR) 100-12.5 mg per tablet TAKE ONE TABLET BY MOUTH ONCE DAILY 90 tablet 0     multivitamin therapeutic (THERAGRAN) tablet Take 0.5 tablets by mouth daily.        omeprazole (PRILOSEC) 20 MG capsule TAKE ONE CAPSULE BY MOUTH TWICE DAILY 180 capsule 0     polyethylene glycol (GLYCOLAX) 17 gram/dose powder Take 17 g by mouth daily. Mix with water 255 g 0     PROPYLENE GLYCOL//PF (SYSTANE, PF, OPHT) Administer 1 drop to both eyes 4 (four) times a day as needed.        sertraline (ZOLOFT) 50 MG tablet Take 1 tablet (50 mg total) by mouth daily. 90 tablet 3     triamcinolone (KENALOG) 0.1 % ointment APPLY AND RUB IN A THIN FILM TO AFFECTED AREAS TWICE DAILY.(AM AND PM), for no more than two weeks 30 g 1     No current facility-administered medications for this visit.        Social History  Reviewed, and she  reports that she quit smoking about 22 years ago. Her smoking use included Cigarettes. She has never used smokeless tobacco. She reports that she does not drink alcohol or use illicit drugs.        Review of  Systems:  A 12 point comprehensive review of systems was negative except as noted.

## 2021-06-10 NOTE — PROGRESS NOTES
Progress Note    Reason for Visit:  Chief Complaint     Adrenal Problem; Thyroid Problem          Progress Note:    HPI:    This pleasant 77-year-old female patient is here for follow-up because of multiple medical problems.      Component      Latest Ref Rng & Units 5/6/2017 5/7/2017 5/23/2017   Sodium      136 - 145 mmol/L 140 140    Potassium      3.5 - 5.0 mmol/L 3.7 3.8 3.7   Chloride      98 - 107 mmol/L 103 103    CO2      22 - 31 mmol/L 30 31    Anion Gap, Calculation      5 - 18 mmol/L 7 6    Glucose      70 - 125 mg/dL 164 (H) 116    BUN      8 - 28 mg/dL 5 (L) 5 (L)    Creatinine      0.60 - 1.10 mg/dL 0.64 0.61 0.62   GFR MDRD Af Amer      >60 mL/min/1.73m2 >60 >60 >60   GFR MDRD Non Af Amer      >60 mL/min/1.73m2 >60 >60 >60   Bilirubin, Total      0.0 - 1.0 mg/dL      Calcium      8.5 - 10.5 mg/dL 8.3 (L) 8.7    Protein, Total      6.0 - 8.0 g/dL      ALBUMIN      3.5 - 5.0 g/dL      Alkaline Phosphatase      45 - 120 U/L      AST      0 - 40 U/L      ALT      0 - 45 U/L      TSH      0.30 - 5.00 uIU/mL      Free T4      0.7 - 1.8 ng/dL      Vitamin D, Total (25-Hydroxy)      30.0 - 80.0 ng/mL      Renin Activity      ng/mL/h      Cortisol      ug/dL 18.7       CT ABDOMEN PELVIS WO ORAL W IV CONTRAST  5/4/2017 1:26 PM      INDICATION: LLQ pain and diarrhea  TECHNIQUE: CT abdomen and pelvis. Multiplanar reformation images (MPR). Dose reduction techniques were used.   IV CONTRAST: Iohexol (Omni) 100 mL  COMPARISON: 10/3/2016     FINDINGS:  LUNG BASES: Minimal linear atelectasis or scar.     ABDOMEN: There is likely slight scar mild sludge within gallbladder, no inflammatory all thickening. Bile ducts, liver, pancreas and spleen are negative. Stable benign left adrenal adenoma. Kidneys negative.     No change in postoperative appearance after endograft AAA repair.     PELVIS: Mild sigmoid diverticulosis with bowel otherwise unremarkable, nothing inflammatory or obstructive.  Prior hysterectomy, no adnexal  mass or free fluid. Stable phleboliths.     MUSCULOSKELETAL: Right hip arthroplasty. Degenerative changes and scoliosis of spine.     IMPRESSION:   CONCLUSION:  1.  No etiology for symptoms identified. Nothing inflammatory or obstructive. No stone disease evident.  Review of Systems:    Nervous System: No headache, dizziness, fainting or memory loss. No tingling sensation of hand or feet.  Ears: No hearing loss or ringing in the ears  Eyes: No blurring of vision, redness, itching or dryness.  Nose: No nosebleed or loss of smell  Mouth: No mouth sores or loss of taste  Throat: No hoarseness or difficulty swallowing  Neck: No enlarged thyroid or lymph nodes.  Heart: No chest pain, palpitation or irregular heartbeat. No swelling of hands or feet  Lungs: No shortness of breath, cough, night sweats, wheezing or hemoptysis.  Gastrointestinal: No nausea or vomiting, constipation or diarrhea.  No acid reflux, abdominal pain or blood in stools.  Kidney/Bladdr: No polyuria, polydipsia, nocturia or hematuria.  Genital/Sexual: No loss of libido  Skin: No rash, hair loss or hirsutism.  No abnormal striae  Muscles/Joints/Bones: No morning stiffness, muscle aches and pain or loss of height.    Current Medications:  Current Outpatient Prescriptions   Medication Sig     acetaminophen (TYLENOL) 500 MG tablet Take 500 mg by mouth every 6 (six) hours as needed for pain.     amLODIPine (NORVASC) 5 MG tablet Take 1 tablet (5 mg total) by mouth 2 (two) times a day.     docusate sodium (COLACE) 100 MG capsule Take 1 capsule (100 mg total) by mouth 2 (two) times a day as needed for constipation.     hydrALAZINE (APRESOLINE) 10 MG tablet Take 1 tablet (10 mg total) by mouth 4 (four) times a day as needed (for very high blood pressures). (Patient taking differently: Take 10 mg by mouth 3 (three) times a day. )     levothyroxine (SYNTHROID, LEVOTHROID) 137 MCG tablet Take 1 tablet (137 mcg total) by mouth Daily at 6:00 am.     losartan  (COZAAR) 50 MG tablet Take 1 tablet (50 mg total) by mouth 2 (two) times a day.     MULTIVIT-MINERALS/FOLIC ACID (ADULT MULTIVITAMIN GUMMIES ORAL) Take 2 tablets by mouth daily.     omeprazole (PRILOSEC) 20 MG capsule TAKE ONE CAPSULE BY MOUTH TWICE DAILY (Patient taking differently: Take 20 mg by mouth 2 (two) times a day before meals. )     polyethylene glycol (GLYCOLAX) 17 gram/dose powder Take 17 g by mouth daily. Mix with water     PROPYLENE GLYCOL//PF (SYSTANE, PF, OPHT) Administer 1 drop to both eyes 4 (four) times a day as needed.      traZODone (DESYREL) 50 MG tablet One at bedtime if needed for insomnia       Patients Active Problems:  Patient Active Problem List   Diagnosis     Osteoarthritis     Cataract     Esophageal Reflux     Hypothyroidism     Dyslipidemia     Chronic Major Depression     Hypertension     Aneurysm Of The Abdominal Aorta     Harris's Esophagus     Osteopenia     Rotator Cuff Tendonitis     Retinal Migraine Headache     Menopause Has Occurred     Dizziness     Obesity     Tinnitus Of Both Ears     Hypertensive urgency     Uncontrolled hypertension     Acute encephalopathy     Adrenal adenoma     S/P AAA repair using bifurcation graft     Weakness     Anxiety     Frequent loose stools     Bloating     Orthostatic hypotension     Urinary incontinence in female       History:   reports that she quit smoking about 22 years ago. Her smoking use included Cigarettes. She has never used smokeless tobacco. She reports that she does not drink alcohol or use illicit drugs.   reports that she quit smoking about 22 years ago. Her smoking use included Cigarettes. She has never used smokeless tobacco. She reports that she does not drink alcohol or use illicit drugs.  History   Smoking Status     Former Smoker     Types: Cigarettes     Quit date: 12/11/1994   Smokeless Tobacco     Never Used      reports that she quit smoking about 22 years ago. Her smoking use included Cigarettes. She has  never used smokeless tobacco. She reports that she does not drink alcohol or use illicit drugs.  History   Sexual Activity     Sexual activity: Not on file     Past Medical History:   Diagnosis Date     AAA (abdominal aortic aneurysm)      Acute encephalopathy 9/26/2015     Adrenal adenoma      Harris esophagus      Constipation      Depression      GERD (gastroesophageal reflux disease)      Hiatal hernia      HLD (hyperlipidemia)      HTN (hypertension)      Hypothyroid      Murmur      Osteoarthritis      Retinal Migraine Headache      S/P AAA repair using bifurcation graft 11/30/2015     Scoliosis      Thyroid nodule     removed nodules and thyroid     Family History   Problem Relation Age of Onset     Heart disease Mother      Hypertension Mother      Heart disease Father      Cancer Brother 57     colon     Hypertension Daughter      Hypertension Daughter      Heart disease Paternal Aunt      Heart disease Paternal Uncle      Past Medical History:   Diagnosis Date     AAA (abdominal aortic aneurysm)      Acute encephalopathy 9/26/2015     Adrenal adenoma      Harris esophagus      Constipation      Depression      GERD (gastroesophageal reflux disease)      Hiatal hernia      HLD (hyperlipidemia)      HTN (hypertension)      Hypothyroid      Murmur      Osteoarthritis      Retinal Migraine Headache      S/P AAA repair using bifurcation graft 11/30/2015     Scoliosis      Thyroid nodule     removed nodules and thyroid     Past Surgical History:   Procedure Laterality Date     JOINT REPLACEMENT       TX ARTHRODESIS ANT INTERBODY MIN DISCECTOMY, CERVICAL BELOW C2      Description: Cervical Vertebral Fusion;  Recorded: 01/21/2013;  Comments: 1981     TX REVISE MEDIAN N/CARPAL TUNNEL SURG      Description: Neuroplasty Decompression Median Nerve At Carpal Tunnel;  Recorded: 01/21/2013;  Comments: bilateral     TX THYROIDECTOMY      Description: Thyroid Surgery Total Thyroidectomy;  Recorded: 06/08/2011;     [place]  "TOTAL ABDOM HYSTERECTOMY  1985    Description: Hysterectomy;  Recorded: 01/21/2013;     [place] TOTAL HIP ARTHROPLASTY Right     Description: Total Hip Replacement;  Recorded: 01/21/2013;  Comments: right, 2003       Vitals   height is 5' 6\" (1.676 m) and weight is 161 lb 6.4 oz (73.2 kg). Her blood pressure is 124/70 and her pulse is 90.         Exam  General appearance: The patient looked well, not in acute distress.  Eyes: no evidence of thyroid eye disease.   Retinal exam: No evidence of diabetic retinopathy.  Mouth and Throat: Normal  Neck: No evidence of thyromegaly, enlarged lymph node or tenderness  Chest: Trachea is central. Chest is clear to auscultation and percussion. Breat sounds are normal.  Cardiovascular exam: JVP is not raised. Heart sounds are normal, no murmurs or rub  Peripheral pulses are palpable.   Abdomen: No masses or tenderness.    Back: No vertebral tenderness or kyphosis.  Extremities: No evidence of leg edema.   Skin: Normal to touch.  No abnormal striae  Neurologic exam:  Visual fields are intact by confrontation, grossly intact. No evidence of peripheral neuropathy.  Detailed foot exam normal.        Diagnosis:  No diagnosis found.    Orders:   No orders of the defined types were placed in this encounter.        Assessment and Plan:  Left adrenal adenoma the adrenal gland has been nonsecretory she had several CT scan on the abdomen which showed that the adrenal gland has remained stable at 2.6 cm on the left side and it is benign appearing there is no need for further CT scan the patient is not a candidate for surgery.    Hypothyroidism postsurgical she had thyroidectomy over 10 years ago currently on 137 mcg of Synthroid TSH is 2.84 free T4 is 1.2 continue with that.    She has been admitted to hospital recently because of variable blood pressure the patient would have high blood pressure lying down and then it will drop when she stands up.    She is currently on amlodipine 5 mg twice a " day hydralazine 10 mg 3 times a day losartan 50 mg twice a day.  She was on Coreg that was stopped blood pressure 124/70 pulse is 90 I counseled her extensively regarding that I did advise her to continue monitoring blood pressure at home we will see the patient again in 1 year with labs before I did spend 40 minutes with the patient more than 50% was spent on counseling and managing her care.

## 2021-06-10 NOTE — PROGRESS NOTES
Assessment:   Mehreen Camara is a 77 y.o. y.o. female with past medical history significant for hypertension, hyperlipidemia, hypothyroidism, GERD, depression who presents today for follow-up regarding low back pain thought to be SI joint dysfunction in etiology.  The patient is status post bilateral SI joint injections on April 4, 2017.  The patient reports that this significantly helped with the left low back pain (she reports 50% relief of the left-sided pain).  The right-sided pain has actually worsened since the injection.  Her pain is located in the right side of the low back and then radiates down into the right buttock and the right posterior thigh stopping at the knee.  Her etiology of pain at this point for the right side is thought to be lumbar facet syndrome.  She does have lumbar spondylosis at multiple levels in the lumbar spine.    She does have some numbness and tingling near the right ankle.  This etiology is unknown, but not thought to be lumbar radicular. pain  She does not have any significant foraminal or lateral recess stenosis on her MRI that indicates that there would be nerve impingement.  She is neurologically intact and without any red flag symptoms.       Plan:     A shared decision making plan was used.  The patient's values and choices were respected.  The following represents what was discussed and decided upon by the physician and the patient.      1.  DIAGNOSTIC TESTS:  The patient's MRI of the lumbar spine was personally reviewed today.  No further diagnostic testing necessary at this time.  2.  PHYSICAL THERAPY: The patient is going to physical therapy.  She is encouraged to complete the course and encouraged to continue doing her home exercise program on a regular basis.  3.  MEDICATIONS: The patient wanted to try gabapentin just at bedtime to see if this would help her sleep at night.  Gabapentin 100 mg was prescribed today.  She should take 1 capsule at bedtime for 3 days.   If she does not notice a significant improvement in her pain at bedtime, then she can increase to 2 capsules at bedtime for 3 nights.  She can increase by 1 tablet every 3 days up to a maximum of 6 tablets/capsules.  She should stay at the minimum dose that controls her pain.    4.  INTERVENTIONS: Discussed a right L4-5 and L5-S1 facet joint injection with the patient.  She would like to proceed with this so an order was placed.  She can schedule at her convenience.    5.  PATIENT EDUCATION: Discussed the diagnosis of facet mediated pain versus the SI joint dysfunction.  All of the patient's questions were answered to her satisfaction today.  She was in agreement with the treatment plan.  The patient will follow-up first available for the injection.  If she has any questions or concerns before that time she is encouraged to call the clinic.  6.  FOLLOW-UP: The patient will follow-up first available for the lumbar facet joint injections and she is encouraged to call with any questions prior to the injection.    Subjective:     Mehreen Camara is a 77 y.o. female who presents today for follow-up regarding low back pain thought to be SI joint dysfunction in etiology.  The patient is status post bilateral SI joint injections on April 4, 2017.  The patient states that she had approximately 50% of her left-sided pain following this injection.  She had no relief of the right side and she actually feels that the right side is worsening now.  The pain is located in the right low back and then radiates into the right buttocks going down the posterior thigh and stopping at the knee.  She rates her pain at a 6 out of 10.  At best it is a 4 out of 10.  At worst it is an 8 out of 10.  The pain is worse with sitting, walking, standing.  She reports she can only sit for about 10 minutes before the pain becomes significant.  She can walk about half a block and she can only stand for about 5 minutes.  She reports that nothing  makes it better at this time.  She reports that she has a difficult time sleeping.  If she goes to roll over in bed this will significantly aggravate the pain.  She does have a new symptom of her right ankle going to sleep.  This will only happen in certain positions.  This is new and has never happened before.     Past medical history is reviewed and is unchanged for any new medical diagnoses in the interim.      Family history is reviewed and is unchanged in the interim.      Review of Systems:  Positive for numbness and tingling, positive for chronic bladder incontinence, positive for weakness.  Negative for any foot drop, headache, dizziness, nausea/vomiting, blurry vision, balance changes.     Objective:   CONSTITUTIONAL:  Vital signs as above.  No acute distress.  The patient is well nourished and well groomed.    PSYCHIATRIC:  The patient is awake, alert, oriented to person, place and time.  The patient is answering questions appropriately with clear speech.  Normal affect.  SKIN:  Skin over the face, posterior torso, bilateral upper and lower extremities is clean, dry, intact without rashes.  MUSCULOSKELETAL:  Gait is non-antalgic.  The patient is able to heel and toe walk without any difficulty.  Mild tenderness over the right greater than left lumbar paraspinal muscles.      The patient has 5/5 strength for the bilateral hip flexors, knee flexors/extensors, ankle dorsiflexors/plantar flexors, ankle evertors/invertors.  The patient does have reproduction of pain on the right side with right facet loading (simultaneous extension and rotation to the right).  NEUROLOGICAL:  Trace patellar, medial hamstring, achilles reflexes which are symmetric bilaterally.  No ankle clonus bilaterally.  Sensation to light touch is intact in the bilateral L4, L5, and S1 dermatomes.       RESULTS: The patient's MRI of the lumbar spine was personally reviewed today.  She does not have any foraminal or lateral recess stenosis.   She does have facet arthropathy at the bilateral L3-4, L4-5, L5-S1 levels.  We see the report for details.

## 2021-06-10 NOTE — PROGRESS NOTES
ASSESSMENT: Onychauxis, foot pain.    PLAN: Toenails were debrided manually and mechanically x9. Return to clinic in nine weeks.         SUBJECTIVE: The patient returns to the [facility] clinic with toenails that are long, thick and painful.  She has tried to cut the toenails herself over the past several years, but ends up with bleeding and pain. She denies other injury.  Attempts have been made to remove the left first and second toenails, but they have grown back.  The right fourth toenail is permanently absent. The toenails were last debrided in the clinic on February 21, 2017.    OBJECTIVE:  General: Pleasant 77 y.o. female in no acute distress.  Vascular: DP pulses are absent. PT pulses are absent. Pedal hair is absent. Feet are warm to the touch.  Cardiac: Pulse is not palpable.  Lymphatic: No edema.  Neuro: Sensation in the feet is grossly intact to light touch.  Derm: Toenails are elongated, thickened and dystrophic with discoloration and subungual debris.  The right fourth toenail is surgically absent.  Skin is thin and shiny but intact.   Musculoskeletal: Adequate passive range of motion and foot and ankle joints.

## 2021-06-10 NOTE — PROGRESS NOTES
[facility] Daily Progress     Patient Name: Mehreen Camara  Date: 3/27/2017  Visit #:  - Medica   PTA visit #: 1   Referral Diagnosis: Low back pain, hip pain  Referring provider: Danni Ortiz MD Dr. Jennifer Kendall Thomas   Visit Diagnosis:     ICD-10-CM    1. Sacral pain M53.3    2. Acute pain of left hip M25.552    3. Generalized muscle weakness M62.81    4. Chronic bilateral low back pain with right-sided sciatica M54.41     G89.29          Assessment:   Patient feels her hip pain has improved. No changes in back or SI joint pain, although patient notes increased numbness and pain radiation to right foot. Advanced HEP for core strengthening and scapular strengthening today- pt required many cues for exercise review and progression, which was likely due to feeling fatigued and not sleeping well. She is appropriate for RC strengthening at next visit. Patient is appropriate for continued skilled 1:1 PT services.    Goal Status:  Pt. will be independent with home exercise program in : 2 weeks  Pt will: walk indoors/outdoors for >10 minutes with pain <3/10 on NPRS to progress towards returning to exercise in 6 visits   Pt will: roll supine<>sidelying without increased L sided pain in order to improve sleep in 6 visits   Pt will: bend over to tie shoes in sitting with pain <3/10 on NPRS to asisst with dressing in 6 visits  Pt will: tolerate stand for >10 minutes without seated rest in order to assist with independent meal preparation in 6 visits     Plan / Patient Education:     Continue with initial plan of care.  Progress with home program as tolerated.  Plan to add rotator cuff strengthening to HEP.     Subjective:       Pain Ratin  Patient reports recent onset of right ankle numbness and tingling, this has worsened over the last week.   Pt feels tired today; she is unable to sleep due to pain. Estimates she sleeps 1.5 hours/night   Left hip pain has improved although is still  "sore in L groin area. Continues to have intense R buttock pain which radiates down R posterior LE.     Objective:     Many cues needed for HEP review and progression- pt c/o fatigue multiple times during session today.     Treatment Today     TREATMENT MINUTES COMMENTS   Evaluation     Self-care/ Home management     Manual therapy     Neuromuscular Re-education     Therapeutic Activity     Therapeutic Exercises 29 UBE WL 3, 3:00 clockwise warm up, subjective measures taken.    Exercises reviewed and added to HEP:    Exercise #2: Abdominal sets- added slow unilateral march x 20 reps, advanced today   Comment #2: HEP x 20 reps   Exercise #3: Hooklying abd set with unilateral hip abduction, partial range to tolerance  Comment #3: Not today   Exercise #4: Glut isometrics advanced to mini bridges  Comment #4: HEP without hold, co-activaiton of TrA and glut activation   Exercise #5: PPT  Comment #5: 5\" x 10  Exercise #6: Scap retraction- advanced with seated row today. Consistent cues for keeping UT relaxed.  Comment #6: HEP x 5 seconds 10-15 times  Exercise #7: Pec stretch in corner  Comment #7: HEP  Exercise #8: Corner stretch with arms/elbows extended  Comment #8: HEP x 30 seconds x 2-3 reps     Gait training     Modality__________________                Total 29    Blank areas are intentional and mean the treatment did not include these items.       Jennifer Diop  3/27/2017  "

## 2021-06-10 NOTE — PROGRESS NOTES
SUBJECTIVE:   Chief Complaint   Patient presents with     Insomnia     having trouble sleeping at night; poss medication reaction? injection reaction?     Shortness of Breath     states it feels like shes 'sucking for air' sometimes; low blood pressure readings at home     Fatigue     'not feeling well' per pt; 'weak' per pt     Mehreen C Jax 77 y.o. female    Current Outpatient Prescriptions   Medication Sig Dispense Refill     acetaminophen (TYLENOL) 500 MG tablet Take 500 mg by mouth every 6 (six) hours as needed for pain.       carvedilol (COREG) 12.5 MG tablet Take 1.5 tablets twice daily. 270 tablet 3     docusate sodium (COLACE) 100 MG capsule Take 50 mg by mouth daily as needed for constipation.        hydrALAZINE (APRESOLINE) 10 MG tablet Take 1 tablet (10 mg total) by mouth 4 (four) times a day as needed. 360 tablet 3     levothyroxine (SYNTHROID, LEVOTHROID) 125 MCG tablet TAKE ONE TABLET BY MOUTH ONCE DAILY 90 tablet 0     losartan-hydrochlorothiazide (HYZAAR) 100-12.5 mg per tablet TAKE ONE TABLET BY MOUTH ONCE DAILY 90 tablet 0     multivitamin therapeutic (THERAGRAN) tablet Take 0.5 tablets by mouth daily.        omeprazole (PRILOSEC) 20 MG capsule TAKE ONE CAPSULE BY MOUTH TWICE DAILY 180 capsule 3     PROPYLENE GLYCOL//PF (SYSTANE, PF, OPHT) Administer 1 drop to both eyes 4 (four) times a day as needed.        sertraline (ZOLOFT) 50 MG tablet Take 1 tablet (50 mg total) by mouth daily. 90 tablet 3     triamcinolone (KENALOG) 0.1 % ointment APPLY AND RUB IN A THIN FILM TO AFFECTED AREAS TWICE DAILY.(AM AND PM), for no more than two weeks 30 g 1     polyethylene glycol (GLYCOLAX) 17 gram/dose powder Take 17 g by mouth daily. Mix with water 255 g 0     traZODone (DESYREL) 50 MG tablet One at night as needed for insomnia 30 tablet 0     No current facility-administered medications for this visit.      Allergies: Penicillins; Atenolol; Atorvastatin; Codeine; Ibuprofen; Lovastatin; and  Cephalexin   No LMP recorded. Patient has had a hysterectomy.    HPI:   Pleasant 77-year-old with labile hypertension presents for recent low blood pressures and feeling fatigued and generally weak.  Patient brings with blood pressure readings from last week, and morning and afternoon readings are consistently low, less than 110 systolic.  Some of the readings are as low as 70 systolic.  States she gets lightheaded when blood pressure is low and feels generally weak.  Her carvedilol was increased at the end of February to 1-1/2 tablets twice daily.  This morning, her blood pressure was 87/68 so she took one carvedilol instead of 1-1/2.    Patient is concerned regarding sleep.  She cannot fall asleep easily, even though she feels tired.  States that some of this is difficulty getting comfortable secondary to hip and leg pain.  She is working with spine clinic and recently had an injection which she states helped her hip pain.    Patient continues on MiraLAX and reports often has several small stools per day.  States she has been having abnormal bowel movement since the fall.  Workup has included GI consult and colonoscopy.  She also had CT abdomen and pelvis in October that did not show any cause for her symptoms.  Patient states [facility] recommended MiraLAX daily.  She feels like it is not helping.  She also reports significant amount of bloating and gas since starting MiraLAX and wonders what she can do for that.    Patient also wonders if sertraline needs to be adjusted.  Thinks it is not working.  Feels anxious all the time.      ROS: as per HPI    OBJECTIVE:   /68 (Patient Site: Right Arm, Patient Position: Sitting, Cuff Size: Adult Large)  Pulse 64  Temp 98.4  F (36.9  C) (Oral)   Resp 16  Wt 164 lb 6.4 oz (74.6 kg)  BMI 26.53 kg/m2    General: Pleasant, well-appearing, in no acute distress.  HEENT: Pupils equal, round, reactive to light.  Oropharynx clear with moist mucous membranes.  TMs clear  bilaterally.  Neck supple without lymphadenopathy.  No carotid bruits.  Cardiovascular: Heart regular rate and rhythm, normal S1-S2, no murmurs, rubs, or gallops  Respiratory: Lungs are clear to auscultation bilaterally without wheezes or crackles.  Good air movement throughout.  No increased work of breathing.  Abdomen: Soft, nontender, nondistended.  No guarding or rebound.  Extremities: Warm and well perfused without edema  Skin: No jaundice or pallor.      ASSESSMENT/PLAN  1. Anxiety  Before adjusting her sertraline I would like to rule out thyroid as cause.  If thyroid result returns normal, plan to increase her sertraline to 100 mg daily.  - Thyroid Cascade    2. Vitamin D deficiency  She does not believe she is taking a supplement, does not believe she took prescription vitamin D.  Recheck level today.  - Vitamin D, Total (25-Hydroxy)    3. Weakness generalized  As below, suspect related to her low blood pressures.  Medication adjustment as below.  Additional labs to look for other causes.  - HM1(CBC and Differential)  - Comprehensive Metabolic Panel  - HM1 (CBC with Diff)    4.  Hypertension  Blood pressure has been running low.  Suspect this is the cause of her fatigue and generalized weakness, not feeling well.  Recommend she decrease her carvedilol to 1 tablet twice daily, and check blood pressure 2-3 days per week and let me know if problems.  If she has a value over 160 systolic, she can take hydralazine 10 mg, as was her previous regimen.    5.  Insomnia  Trial of trazodone prescribed

## 2021-06-10 NOTE — PROGRESS NOTES
MTM Encounter    Assessment/Plan  Nausea, diarrhea, fatigue: Mehreen's symptoms could be due to too much levothyroxine, dose increase of sertraline, addition of gabapentin.  We developed the plan today to stop her gabapentin as she feels this is the most likely offending agent.  We will also reduce her MiraLAX to half cap full per day until her diarrhea has completely resolved.  In a week and a half if she is still symptomatic we will have her come back in for a repeat TSH to rule out over dosing her levothyroxine.  If her TSH comes back normal then we can consider cutting back on her sertraline.  It is possible that these symptoms are all related to the dose increase of sertraline it will resolve in the next 1-2 weeks.  She has significant anxiety which strongly warrants the dose increase in sertraline.  - Stop gabapentin  - Check TSH in 10d  - Consider reducing sertraline    Hypertension: BP well controlled today, on the low end of normal at 105/72.  I instructed Mehreen to take her carvedilol as prescribed and if her blood pressures consistently low that we could consider reducing the dose again.  I informed her that it is not good to frequently change her dose of medications like carvedilol.  She agrees to not do this moving forward.  - Continue carvedilol 12.5mg BID    Insomnia:  - trazodone discontinued as she did not find this effective    Follow Up  No Follow-up on file.      Subjective/Objective  Mehreen Camara is a 77 y.o. female here for a medication therapy management (MTM) appointment; her chief concern today is medication side effects.  She is having nausea and diarrhea.  Her nausea started two weeks ago and her diarrhea started earlier this week.    Mehreen started on gabapentin about a month ago.  On April 17th, Melvin levothyroxine and sertraline doses were both changed.  Her carvedilol was also reduced to 1 tablet twice daily from 1.5 tablets twice daily.    Insomnia: Mehreen tried trazodone  which did not really work.  She also notes that the gabapentin has made it more difficult for her to sleep.  She has been feeling dizzy and lightheaded after taking this.    Depression: Sertraline was increased from 50mg to 100mg on April 17th.    Hypertension: Mehreen has been taking 1/2 doses of her carvediolol when her blood pressures been running low.  This was recently reduced from 1.5 tablets to 1 tablet twice daily.    Hypothyroidism: Recently Mehreen's TSH was elevated and her levothyroxine was increased from 125  g to 137  g.  ----------------    Mehreen's medication list was reviewed with them, discussing reason for use, directions for use, and potential side effects of each medication as needed. Indication, safety, efficacy, and convenience was assessed for all medications addressed above.  No environmental factors were noted currently affecting patient.  This care plan was communicated via EMR with her primary care provider, Danni Ortiz MD, who is the authorizing prescriber for this visit.  Direct supervision was available by either the patient's PCP or other available provider.  Time and complexity billing metrics are included in the docflowsheet linked to this visit.  Time spent: 30 minutes      Rian Tapia, [place]  [facility] Pharmacist  [facility]  [phone]

## 2021-06-10 NOTE — PROGRESS NOTES
SUBJECTIVE:   Chief Complaint   Patient presents with     Follow-up     [facility] f/u- [facility] 5/4 - 5/11- Dizziness, blood pressure     Mehreen OBRIEN Jax 77 y.o. female    Current Outpatient Prescriptions   Medication Sig Dispense Refill     acetaminophen (TYLENOL) 500 MG tablet Take 500 mg by mouth every 6 (six) hours as needed for pain.       amLODIPine (NORVASC) 5 MG tablet Take 1 tablet (5 mg total) by mouth 2 (two) times a day. 60 tablet 0     docusate sodium (COLACE) 100 MG capsule Take 1 capsule (100 mg total) by mouth 2 (two) times a day as needed for constipation. 10 capsule 0     hydrALAZINE (APRESOLINE) 10 MG tablet Take 1 tablet (10 mg total) by mouth 4 (four) times a day as needed (for very high blood pressures). 360 tablet 3     hydrALAZINE (APRESOLINE) 25 MG tablet Take 1 tablet (25 mg total) by mouth 3 (three) times a day. 90 tablet 0     levothyroxine (SYNTHROID, LEVOTHROID) 137 MCG tablet Take 1 tablet (137 mcg total) by mouth Daily at 6:00 am. 90 tablet 0     losartan (COZAAR) 50 MG tablet Take 1 tablet (50 mg total) by mouth 2 (two) times a day. 60 tablet 0     MULTIVIT-MINERALS/FOLIC ACID (ADULT MULTIVITAMIN GUMMIES ORAL) Take 2 tablets by mouth daily.       omeprazole (PRILOSEC) 20 MG capsule TAKE ONE CAPSULE BY MOUTH TWICE DAILY (Patient taking differently: Take 20 mg by mouth 2 (two) times a day before meals. ) 180 capsule 3     polyethylene glycol (GLYCOLAX) 17 gram/dose powder Take 17 g by mouth daily. Mix with water 255 g 0     PROPYLENE GLYCOL//PF (SYSTANE, PF, OPHT) Administer 1 drop to both eyes 4 (four) times a day as needed.        traZODone (DESYREL) 50 MG tablet One at bedtime if needed for insomnia 30 tablet 0     No current facility-administered medications for this visit.      Allergies: Penicillins; Atenolol; Atorvastatin; Codeine; Ibuprofen; Lovastatin; and Cephalexin   No LMP recorded. Patient has had a hysterectomy.    HPI:   Pleasant 77-year-old presents with her 2  daughters for hospital follow-up.  She was hospitalized at [facility] from the fourth through the 11th for lightheadedness, orthostatic hypotension, supine hypertension.  She was evaluated by cardiology.  Multiple medication changes were made.  Her Coreg was discontinued due to concern that it was contributing to symptoms due to blunted heart rate response.  She was tapered off of her Zoloft, due to concerns that Zoloft could be contributing to lightheadedness.  She was on Zoloft 100 mg recently, but that was decreased to 50.  After hospital discharge she was supposed to decrease from 50-25 for 3 days and stop.  She has done that and she is now off of the Zoloft.  In the cardiology consult note, they mentioned using hydralazine 25 mg at nighttime due to severe supine hypertension.  On the discharge summary, she was instructed to use hydralazine 25 mg 3 times daily.  Her usual medication of losartan 50 mg twice daily was continued.  Amlodipine 5 mg twice daily was started because of discontinuing the carvedilol.  She was instructed to use compression stockings and liberalize salt intake.  She is not using compression stockings.  She was referred to home physical and occupational therapy, and patient reports they have been in touch with her.      Patient has been compliant with all of the medication changes recommended in the hospital, but states that she feels even worse since hospital discharge.  Feels lightheaded, feels out of it.  Also concerned about feeling hot and cold, getting sweaty at night.  Feels numbness in 1 of her toes.  Cannot sleep, and this is extremely bothersome to her.  Feels anxious.  Recently had an episode of constipation but managed that by doubling her MiraLAX.      Also has concerns about epigastric bloating and feeling full quickly after eating.  Frequently uncomfortable in the epigastric region.  States she was due to have EGD a while back, but had canceled it due AAA repair.  History of  Harris's esophagus.  Review of [place] [facility] notes shows that she had consultation there in 2013, was complaining of early satiety and epigastric bloating at that time.  EGD was advised, unclear if she had one at that time.  She continues to take omeprazole twice daily.  She was seen in [facility] several months ago for constipation.  CT abdomen pelvis May 4, 2017 did not show any acute findings.  Adrenal adenoma stable.    Patient follows with Dr. Hyatt in endocrinology, has blood work there next week, and an appointment the following week.    Over the last year, patient has had multiple medication changes for labile blood pressure as well as frequently feeling lightheaded.  We have made medication changes in primary care, also through our pharmacist here at clinic, her endocrinologist has made changes, and she has been referred to nephrology due to difficulty control hypertension.  It appears she is still not on an optimized regimen.  Since hospital discharge, she reports multiple episodes of hypotension.  She brings with her some blood pressure readings from home, many are under 100 systolic.    Daughters have noticed more difficulty over the last year with patient managing her blood pressure as well as frequently feeling unwell, and frequent lightheaded episodes.  They would like to get to the bottom of this.    ROS: as per HPI    OBJECTIVE:   /82 (Patient Site: Right Arm, Patient Position: Sitting, Cuff Size: Adult Regular)  Pulse 96  Temp 97.9  F (36.6  C) (Oral)   Resp 18  Wt 161 lb 5 oz (73.2 kg)  BMI 26.04 kg/m2    General: Pleasant, well-appearing, in no acute distress.  HEENT: Pupils equal, round, reactive to light.  Oropharynx clear with moist mucous membranes.  Neck supple without lymphadenopathy.  Cardiovascular: Heart regular rate and rhythm, normal S1-S2, no murmurs, rubs, or gallops  Respiratory: Lungs are clear to auscultation bilaterally without wheezes or crackles.   Good air movement throughout.  No increased work of breathing.  Abdomen: Soft, nontender, nondistended.  No guarding or rebound.  Extremities: Warm and well perfused without edema  Skin: No jaundice or pallor.  Psychiatric: Presents on time and well groomed.  Normal speech and thought content.  Good eye contact.  Appears somewhat anxious.  Neuro: Alert and oriented.  Good historian.    ASSESSMENT/PLAN  Reviewed medication list with patient.  Reviewed hospital discharge summary, imaging studies from hospital including chest x-ray, CT abdomen pelvis, head CT.  Reviewed hospital labs, cardiology consult.  1. Lightheaded, orthostatic hypotension, supine hypertension  Difficult to manage, labile blood pressure, with severe swings in blood pressure.  In the past, we have requested assistance through nephrology, endocrinology to see if related to adrenal adenoma, and cardiology.  Still having difficulty managing this.  Evaluation through neurology to see if neuro degenerative disease or autonomic nervous system issue.  - Ambulatory referral to Neurology    2.  Hypotension  Overall, blood pressures are running low since hospital discharge and she feels more lightheaded.  Decrease hydralazine to 10 mg in the morning and midday, and 25 mg at night before bed due to supine hypertension noted in the hospital.  Follow-up here with me in 1-2 weeks, let me know sooner if problems or concerns, specifically if consistently high or low blood pressures.  - Ambulatory referral to Neurology    3. GERD (gastroesophageal reflux disease), Harris's esophagus, early satiety  Definitely agree with proceeding with EGD, schedule this at their earliest convenience  - Ambulatory Referral for Upper GI Endoscopy    4.  Insomnia  Suspect related to multiple medication changes, most likely discontinuing Zoloft.  Could try melatonin or trazodone, but not together.    5.  Anxiety  I am not sure how much the zoloft was contributing to her  lightheadedness, and at baseline she has fairly significant anxiety, especially around health issues and symptoms.  She will likely need an antianxiety medication, but we will hold off for now since she recently had multiple medication changes, and see how she does over the next couple of weeks.

## 2021-06-10 NOTE — PROGRESS NOTES
Patient called me today because she is feeling very dizzy and nauseous after taking her gabapentin. We spoke about this same issue on Thursday, April 27th (see note). She has been taking a different dosage of the pill every night depending on how she feels. She is confused as to why she is even taking this medication so I advised her to call the  pain/spine clinic to clarify what she needs to do. She said the clinic did not return her phone call last Friday (there is a note that they spoke) so I called the clinic and spoke to BOBBY Martin [phone]. She said she would call the patient and have her come into their clinic since she is not well. The patient agreed to see the [facility], so I scheduled her an appointment for May 2nd.

## 2021-06-10 NOTE — PROGRESS NOTES
"     [facility] Daily Progress     Patient Name: Mehreen Camara  Date: 3/27/2017  Visit #:  - Medica   PTA visit #: 1   Referral Diagnosis: Low back pain, hip pain  Referring provider: Danni Ortiz MD Dr. Jennifer Kendall Thomas   Visit Diagnosis:     ICD-10-CM    1. Sacral pain M53.3    2. Acute pain of left hip M25.552    3. Generalized muscle weakness M62.81    4. Chronic bilateral low back pain with right-sided sciatica M54.41     G89.29          Assessment:   Patient continues to report improvement in hip pain without change in back/SI joint pain. She does report she has been no compliant with HEP due to poor sleep during the night. Progressed HEP with rotator cuff strengthening- pt was able to perform ex's correctly without increased pain post treatment. Patient is appropriate for continued skilled 1:1 PT services. Plan to continue hip mobilization as tolerated and shoulder strengthening at next visit.     Goal Status:  Pt. will be independent with home exercise program in : 2 weeks  Pt will: walk indoors/outdoors for >10 minutes with pain <3/10 on NPRS to progress towards returning to exercise in 6 visits   Pt will: roll supine<>sidelying without increased L sided pain in order to improve sleep in 6 visits   Pt will: bend over to tie shoes in sitting with pain <3/10 on NPRS to asisst with dressing in 6 visits  Pt will: tolerate stand for >10 minutes without seated rest in order to assist with independent meal preparation in 6 visits     Plan / Patient Education:     Continue with initial plan of care.  Progress with home program as tolerated.    Subjective:       Pain Ratin  Patient reports she is still not sleeping well, estimates she slept 2 hours last night.   Hip pain on L has improved significantly, but is still sore.  R foot dorsum numbness continues but has not worsened since last visit.   Shoulder pain is \"about the same\".     Objective:       Treatment Today   "   TREATMENT MINUTES COMMENTS   Evaluation     Self-care/ Home management     Manual therapy 9 Grade III distraction B hip in open pack x 60 second hold x 4 reps B. Pt reports no pain with mobilization.   Neuromuscular Re-education     Therapeutic Activity     Therapeutic Exercises 20 UBE WL 3, 3:00 clockwise warm up, subjective measures taken.    Exercises reviewed:  Exercise #6: Scap retraction- advanced with seated row today. Consistent cues for keeping UT relaxed.    Added to HEP:  Exercise #9: Standing IR and ER with L1 band   Comment #9: HEP x 15 reps B, cues for keeping elbow at side throughout      Gait training     Modality__________________                Total 29    Blank areas are intentional and mean the treatment did not include these items.       Jennifer Diop  3/27/2017

## 2021-06-10 NOTE — PROGRESS NOTES
Outreached to patient today to introduce myself as her new care guide. She explained that she had injections on Monday (04/24) and that her new medication was making her light headed. Was prescribed 100 mg Gabapentin. She did not feel well so she increased her dose to 200mg on Monday night (04/24) and is now taking 50mg (04/26). She has an appointment tomorrow [facility] PT Dr. Diop and will ask about her medications or else call the Pain/Spine center and ask them. If she is still confused about her medications, I advised her to call me so I could set her up with the [facility]. Her blood pressure today at noon was 101/77 and yesterday it was 98/73. Patient is coming into [facility] on May 2nd to see the Podiatrist. I asked her if I could come in after and say hi for a meet and greet and she said okay. Will update her goals then. I called the [facility] pain/spine clinic at [phone] and updated the dr. Crenshaw's CA on patient's dosage change as well as Dr. Ortiz at [facility].

## 2021-06-10 NOTE — PROGRESS NOTES
"     [facility] Daily Progress     Patient Name: Mehreen Camara  Date: 3/27/2017  Visit #: 10/12 - [facility]   [facility]A visit #: 1   Referral Diagnosis: Low back pain, hip pain  Referring provider: Danni Ortiz MD Dr. Jennifer Kendall Thomas   Visit Diagnosis:     ICD-10-CM    1. Sacral pain M53.3    2. Acute pain of left hip M25.552    3. Generalized muscle weakness M62.81    4. Chronic bilateral low back pain with right-sided sciatica M54.41     G89.29          Assessment:   Patient appears to have made progress in pain since last visit. She reports her hip pain has resolved, and lower back pain has improved since injections 3 days ago. Pt continues to experience B buttock pain with sitting and standing for long periods. She tolerated manual therapy well with an overall decrease in pain and increase in gait speed post treatment. Plan to FU next week and continue lumbar and hip mobilization with neurodynamic exercise.     Goal Status:  Pt. will be independent with home exercise program in : 2 weeks (goal partially met)  Pt will: walk indoors/outdoors for >10 minutes with pain <3/10 on NPRS to progress towards returning to exercise in 6 visits (goal partially met, 6/10)  Pt will: roll supine<>sidelying without increased L sided pain in order to improve sleep in 6 visits (progressing, pain located in buttock)  Pt will: bend over to tie shoes in sitting with pain <3/10 on NPRS to asisst with dressing in 6 visits (rates pain 5/10)  Pt will: tolerate stand for >10 minutes without seated rest in order to assist with independent meal preparation in 6 visits (goal partially met)    Plan / Patient Education:     Continue with initial plan of care.  Progress with home program as tolerated.    Subjective:   Patient reports feeling dizziness after increasing her gabapentin dosage. She recalls feeling \"light headed, like my head was floating\".   She received lumbar spine injections on 4/24/17. She feels her " "lower back pain has improved since these injections, but continues to c/o buttock pain B with sitting or standing for long periods. Patient continues to struggle with sleeping as she wakes multiple times per night.   \"My shoulder is a little better\".   \"I always feel better when I leave here, but the pain comes back\".     Pain Ratin    Objective:     Non-antalgic ambulation today, pt reports a decrease in pain post treatment. Visibly improved gait speed with ambulation treatment gym>[facility].    Treatment Today     TREATMENT MINUTES COMMENTS   Evaluation     Self-care/ Home management     Manual therapy 25 Pt was placed in L sidelying with grade III  semispecific traction at L4/5. Performed oscillations through R sided lumbar facets and R PSIS, followed by SLR nerve slider active and passive x 15 reps x 2 sets.     Followed with grade III LE distraction x 45 seconds open pack of hip x 2 reps B.      Neuromuscular Re-education     Therapeutic Activity     Therapeutic Exercises 10 Nu-step WL5, 57:00. Cues for SPM >60.    Exercises reviewed:  - Ab sets with unilateral marching x 20 reps total, cues for continued abdominal activation.   - Ab set with glut set and mini bridge x 20 reps.  Added to HEP:  - Ab set with ball raise overhead, red medicine ball used x 20 reps. Instructed pt to use soup can at home.    Gait training     Modality__________________                Total 35    Blank areas are intentional and mean the treatment did not include these items.       Jennifer Diop  3/27/2017  "

## 2021-06-10 NOTE — PROGRESS NOTES
[facility] Discharge Summary  Patient Name: Mehreen Camara  Date: 8/21/2017  Referral Diagnosis: Low back pain, hip pain   Referring provider: Danni Ortiz MD  Visit Diagnosis:   1. Chronic left shoulder pain     2. Decreased ROM of left shoulder     3. Generalized muscle weakness     4. Sacral pain     5. Acute pain of left hip       Goal Status:  Pt. will be independent with home exercise program in : 2 weeks (goal partially met)  Pt will: walk indoors/outdoors for >10 minutes with pain <3/10 on NPRS to progress towards returning to exercise in 6 visits (goal partially met, 6/10)  Pt will: roll supine<>sidelying without increased L sided pain in order to improve sleep in 6 visits (progressing, pain located in buttock)  Pt will: bend over to tie shoes in sitting with pain <3/10 on NPRS to asisst with dressing in 6 visits (rates pain 5/10)  Pt will: tolerate stand for >10 minutes without seated rest in order to assist with independent meal preparation in 6 visits (goal partially met)    Patient was seen for 11 visits from initial evaluation to 5/3/17 with 0 missed appointments.  Patient did not return to physical therapy due to other health concerns.    Therapy will be discontinued at this time.  The patient will need a new referral to resume.    Thank you for your referral.  Jennifer Georgecatrachitochoco  8/21/2017  9:46 AM   [facility] Daily Progress     Patient Name: Mehreen Camara  Date: 3/27/2017  Visit #: 11/12 - [facility]   [facility] visit #: 1   Referral Diagnosis: Low back pain, hip pain  Referring provider: Danni Ortiz MD Dr. Jennifer Kendall Thomas   Visit Diagnosis:     ICD-10-CM    1. Sacral pain M53.3    2. Acute pain of left hip M25.552    3. Generalized muscle weakness M62.81    4. Chronic bilateral low back pain with right-sided sciatica M54.41     G89.29          Assessment:   Patient feels her symptoms overall have improved by 80% since evaluation. She has noticed the  "most improvement in left hip pain and lower back pain. Pt continues to c/o B buttock pain, but this has also improved since PT evaluation. Bilateral buttock pain however limits her from sitting or standing for long periods. Patient's gait pattern is less antalgic overall and she is able to tolerate hip and lumbar mobilization during treatment. She finds relief from mobilization for 24-48 hour post treatment. Patient has been partially compliant with HEP and is appropriate for additional core and hip strengthening at next treatment. Encouraged pt to continue exercises. Plan to follow up with patient next week.       Goal Status:  Pt. will be independent with home exercise program in : 2 weeks (goal partially met)  Pt will: walk indoors/outdoors for >10 minutes with pain <3/10 on NPRS to progress towards returning to exercise in 6 visits (goal partially met, 6/10)  Pt will: roll supine<>sidelying without increased L sided pain in order to improve sleep in 6 visits (progressing, pain located in buttock)  Pt will: bend over to tie shoes in sitting with pain <3/10 on NPRS to asisst with dressing in 6 visits (rates pain 5/10)  Pt will: tolerate stand for >10 minutes without seated rest in order to assist with independent meal preparation in 6 visits (goal partially met)    Plan / Patient Education:     Continue with initial plan of care.  Progress with home program as tolerated.    Subjective:   Patient reports she reduced her gabapentin dosage last week but continued to feel dizziness and nausea. She eventually stopped the gabapentin and is unsure if she feels any better.   \"Pain is good in my hip and the middle of my back\". Continues to c/o L buttock and R buttock pain, L > R which limits her from sitting.   She felt pain relief for 24 hours after last visit.  Overall, sx have improved 80% since the start of physical therapy. She notices less numbness present in R foot, less intense and less frequent than " previously.    Pain Ratin    Objective:       Treatment Today     TREATMENT MINUTES COMMENTS   Evaluation     Self-care/ Home management     Manual therapy 25 Pt was placed in L sidelying with grade III  semispecific traction at L4/5. Performed oscillations through R sided lumbar facets and R PSIS, followed by SLR nerve slider active and passive x 15 reps x 2 sets.     Followed with grade III LE distraction x 45 seconds open pack of hip x 2 reps B.      Neuromuscular Re-education     Therapeutic Activity     Therapeutic Exercises 5 Exercises reviewed:  - Ab sets with unilateral marching x 20 reps total, cues for continued abdominal activation.   - Ab set with glut set and mini bridge x 20 reps.    Reviewed from HEP addition:   - Ab set with 3# dumbbell raise overhead. TC at lumbar spine. Pt states she has not been performing this at home.   Gait training     Modality__________________                Total 30    Blank areas are intentional and mean the treatment did not include these items.       Jennifer Diop  3/27/2017

## 2021-06-11 NOTE — PROGRESS NOTES
[facility]    Facility:   [facility] [[id number]]   Code Status: POLST AVAILABLE      Reevaluation of 77-year-old female admitted to hospital with failure to thrive, dizziness, orthostatic hypotension. Extensive evaluation in hospital with nosignificant findings other than abnormal cardiac echo texture on echocardiogram potentially consistent with amyloidosis. Transferred to TCU for rehabilitation and management of medical problems which otherwise include significant anxiety and hypertension.    Review of systems: patient reports she has felt dizzy on one occasion since transfer to TCU. She has refused to use ace wraps. Believe she cannot function independently. Reviews family of origin and issues of childhood. Denies chest pain or palpitations. Back pain while present is tolerable. Feels weak at all times. Is participating in physical therapy. Anxiety continues to be troublesome, did receive benefit from Ativan in the past, wonders if she should start medication for her anxiety. Worries excessively at all times. By her report she feels tense at all times and is waiting for the next catastrophe to happen. Remainder of 12 point review of systems obtained negative. Nursing staff report that no orthostatic findings have occurred since transfer to TCU and that patient has refused use of her ace wraps.    Exam: patient initially lying in bed, able to sit on edge of bed without difficulty. Anxious, verbal, oriented times three. Affect flat. No facial asymmetry. Pharynx without erythema. No carotid bruits. S1 and S2 regular with 1/6 systolic. Pulmonary exam without rales or wheezes. Abdomen without masses tenderness organomegaly. Periphery without edema. No calf tenderness or swelling. Strength symmetrical and diminished as is muscle mass upper and lower extremities. No hand drift or tremor.    Impression and plan: chronic hypertension, systolic levels variable but satisfactory,  continue current triple antihypertensive management. Orthostasis has not occurred since transfer to TCU, patient reports one episode of dizziness, ace wraps would be beneficial, patient declining these. Chronic low back pain tolerable at present. Generalized anxiety disorder, escalating anxiety with recent hospitalization and physical events, reviewed in detail with patient, no intervention at present, continue to observe with increased activity and in controlled environment, consideration of BuSpar initially at 5 mg b.i.d. Insomnia ongoing, continue melatonin 3 mg at HS, consider increase in dose. Multiple medical concerns are reviewed, need for rehabilitation with significant deconditioning, total time of evaluation greater than 35 minutes, greater than 50% of time spent in coordination of care and counseling.    Electronically signed by: Shari Aguilera MD

## 2021-06-11 NOTE — PROGRESS NOTES
Assessment:   Mehreen Camara is a 77 y.o. y.o. female with past medical history significant for hypertension, hyperlipidemia, hypothyroidism, GERD, depression  who presents today for follow-up regarding debilitating low back pain with radiation into the buttocks.  Does have a history of chronic low back pain which is thought to be lumbar facet syndrome given her lumbar spondylosis seen on imaging.  He had significant relief following right L4-5 and L5-S1 facet joint injections up until she was hospitalized.  Her left-sided low back pain had resolved following a sacroiliac joint injection, indicating that SI joint dysfunction was causing some of her pain.   She is neurologically intact and without any red flag symptoms.       Plan:     A shared decision making plan was used.  The patient's values and choices were respected.  The following represents what was discussed and decided upon by the physician and the patient.      1.  DIAGNOSTIC TESTS: The patient's MRI of the lumbar spine from March 2017 performed through [facility] was reviewed today.  No further diagnostic testing necessary at this time.  2.  PHYSICAL THERAPY: Patient would benefit from outpatient physical therapy at this time to work on core strengthening, range of motion, flexibility.  She also needs to strengthen her left quadriceps muscle.  The patient is currently receiving home care after a recent hospitalization.  The patient reports that all homecare does is go for a walk with her.  She feels that she is safe to walk on her own, and feels that she can take care of herself on her own.  Her primary care physician will be contacted to see if she does need further home care or if this could be stopped and she could go to outpatient physical therapy at this time.  3.  MEDICATIONS: The patient can continue to take gabapentin.  No other changes to her medications are made today.    4.  INTERVENTIONS: Discussed repeating a right L4-5 and L5-S1 facet  joint injection.  The pros and cons of the injection were discussed at length with the patient.  The patient took a long time to make decision, discussing with the physician and with her daughter.  In and she wanted to move forward with the injection and she can schedule this at her convenience.    5.  PATIENT EDUCATION:    -Reassure the patient that the numbness and tingling in her foot is nothing to worry about and as long as she can change positions to have this resolved she should continue to try to avoid positions that trigger the symptoms.  - Discussed with the patient that she does need to work on strengthening her leg muscles in addition to strengthening her core muscles.  Discussed that it would be very important for her to maintain a home exercise program the physical therapy has given her in the past.  She states that she has continued to do the exercises as much as he can now that she is home from her hospitalization.  -All of her and her daughter's questions were answered to their satisfaction today.  They were in agreement with the treatment plan.  6.  FOLLOW-UP: Patient will follow-up first available for the injections.    A total of 40 minutes was spent in the care of this patient.  25 minutes was spent counseling, teaching, going over the treatment options with the patient and helping her to make a decision regarding the treatment plan.    Subjective:     Mehreen Camara is a 77 y.o. female who presents today for follow-up regarding chronic right-sided low back pain.  The patient underwent a right L4-5 and L5-S1 facet joint injection on April 24 of 2017.  She reports that this helped significantly with her back pain.  Her previous hip pain had resolved following bilateral SI joint injections.  She reports that it a few weeks after the injection she ended up in the hospital due to blood pressure issues as well as feeling dizzy and weak.  She reports that laying around in the hospital bed seem to  aggravate the back pain.  Ever since she has been home she has had a significant increase in back pain which she describes as being nearly debilitating.  She rates her pain at a 5 out of 10.  At best it is a 4 out of 10.  At worst it is an 8 out of 10.  Pain starts in the center of the low back and then radiates down the posterior buttocks and the proximal thighs.  It does not go all the way down to the knees.  She is having some numbness and tingling occasionally in the right foot that is intermittent.  She can have this resolve if she will support her right foot.  She says that this typically only occurs when she is laying down, though it can occur with walking though not consistently.  She does feel like she is weak but says that this is mainly due to deconditioning from laying around from her hospitalization.  Her pain seems to be worse with sitting or with laying down.  She says it is really hard for her to find a comfortable position.  At this time nothing is helping and she feels quite frustrated.    Past medical history is reviewed and is unchanged for any new medical diagnoses in the interim.      Family history is reviewed and is unchanged in the interim.        Review of Systems:  Positive for numbness and tingling as well as a sensation of weakness (mainly deconditioning), dizziness, balance changes.  Negative for any bowel or bladder incontinence, footdrop, headache, nausea/vomiting, blurry vision.     Objective:   CONSTITUTIONAL:  Vital signs as above.  No acute distress.  The patient is well nourished and well groomed.    PSYCHIATRIC:  The patient is awake, alert, oriented to person, place and time.  The patient is answering questions appropriately with clear speech.  Normal affect.  SKIN:  Skin over the face, posterior torso, bilateral upper and lower extremities is clean, dry, intact without rashes.  MUSCULOSKELETAL:  Gait is non-antalgic.  The patient is able to heel and toe walk without any  difficulty.  Mild tenderness over the bilateral, but right greater than left lumbar paraspinal muscles.      Patient has 4/5 strength of bilateral hip flexors and 4/5 strength of the left knee extensor.  5/5 strength in the right knee extensor.  5/5 for the bilateral ankle dorsiflexors, ankle evertor/inverters.  NEUROLOGICAL: Trace patellar, medial hamstring, achilles reflexes which are symmetric bilaterally.  No ankle clonus bilaterally.  Sensation to light touch is intact in the bilateral L4, L5, and S1 dermatomes.       RESULTS: His MRI of the lumbar spine is personally reviewed today.  The patient does have significant facet arthropathy seen at the bilateral L4-5 and L5-S1 facet joints.  Please see the report for details.

## 2021-06-11 NOTE — PROGRESS NOTES
[facility]    Facility:   [facility] [[id number]]   Code Status: POLST AVAILABLE      Reevaluation of 77-year-old female with multiple hospitalizations over recent months for lightheadedness, orthostatic hypotension, generalized weakness, failure to thrive. Long-standing hypertension anxiety and depression. Transferred to TCU for rehabilitation, observation of clinical status and management of medical problems.    Review of systems: patient with previous history of fecal impaction, states she took a senna tablet two days ago, did not believe she needed the senna, is taking MiraLAX, developed loose stools for 48 hours, was instructed to eat bananas after phone call to physician yesterday, now believes she is constipated, highly concerned regarding her constipation. States she is fearful she have an impaction again. Has been on MiraLAX for some time, believe she may have been on on Metamucil in past but is unsure. Stool produced yesterday. States she has been trying to consume applesauce and damien crackers. Currently describes some cramping in the lower abdominal region, states she may need to defecate but does not know if she can. Continues to be highly anxious at all times. Sleeps poorly. Worries excessively. Mood is diminished. Denies chest pain or palpitations. Unaware of recurrent dizzy spells. No focal neurologic deficits. Relates that all of her issues began following AAA repair. Remainder of 12 point review of systems obtained negative.    Exam: patient anxious, initially ambulating in kyle with walker, obsessing regarding her bowel status. Affect flat and anxious. Blood pressure 123/78, pulse 95, respiratory 18, temperature 97.3, 02 90%. No facial asymmetry. S1 and S2 regular. Pulmonary exam without rales or wheezes. Abdomen with normoactive bowl sounds, liver and spleen without enlargement, no specific tenderness lower abdomen, no masses or rebound. Periphery with trace  nonpitting edema. Muscle tone and strength diminished and symmetrical upper and lower extremities. No tremor. No hand drift.    Impression and plan: chronic intractable anxiety and depression, by her report she had a positive response to Zoloft in past, Zoloft use has started and stopped on several occasions, brief trial of Cymbalta not effective and/or caused side effects, BuSpar will be considered again and patient will consider initiation of medication. Loose stools, continuing on MiraLAX daily, senna was given, patient attributes loose stools to senna, states she has variable bowel movements, reviewed dietary intake of applesauce damien crackers and prune juice which could be beneficial to her bowel status, at present begin Metamucil 1 teaspoon per day in hopes of normalizing bowl movements. Orthostatic hypotension, patient has not displayed lightheadedness or orthostatic symptoms since transfer to TCU, continues on Norvasc 2.5 mg a.m., 5 mg p.m., hydralazine 10 mg TID, losartan 50 mg Q day, continue to monitor. Significant deconditioning with ongoing need for rehabilitation. Multiple medical concerns are reviewed in detail, additional discussion with patient's daughter in attendance and with nursing staff. Total time of evaluation greater than 35 minutes, greater than 50% of time spent in coordination of care and counseling.    Electronically signed by: Shari Aguilera MD

## 2021-06-11 NOTE — PROGRESS NOTES
[facility]    Facility:   [facility] [[id number]]   Code Status: POLST AVAILABLE       77-year-old female is seen for admission evaluation to TCU. History is taken from accompanying hospital notes, patient who is a moderate historian and two daughters have significant input into history. Patient admitted to hospital 7/3 with complaints of lightheadedness poor oral intake and inability to care for self at home. Previous history of AAA repair, migraine headaches, chronic orthostatic hypotension, non-functional adrenal adenoma, hypertension, hyperlipidemia, hypothyroidism, chronic anxiety. Patient with extensive evaluation in hospital, possible cardiac amyloidosis diagnosed, CT scan and MRI of brain, neurologic evaluation, CT scan of abdomen and pelvis, laboratory testing, cardiology evaluation, recent endocrinology evaluation, no definitive cause of symptoms found other then orthostatic hypotension. Patient has had multiple changes in medication including resumption and then cessation of Zoloft, change in amlodipine to once daily, reverse to twice daily dosing, continued on hydralazine, trazodone initiated for insomnia poorly tolerated, melatonin tolerated for insomnia, significant deconditioning contributing to symptoms, transferred to TCU for rehabilitation, observation of clinical status and management of medical problems.    Past medical history, social history, family history, drug allergies, medication list, code status reviewed in Epic.    Review of systems: patient believes she has had extensive evaluation but nothing has been done to help her. She feels weak at all times, aware of dizziness intermittently on standing. Has not used ace wraps, has not been treated with florinef. Back pain present at all times. Anxious at all times. Sleep disturbance present, believes trazodone causes excessive drowsiness in the a.m. Denies chest pain or palpitations. Feels unable to care for  herself. Daughter contributes much to history, states her mother has received negligible care from the multiple physicians she has seen. The daughter herself has had extensive medical evaluation including evaluation at [facility], she believes her mother's issues have been worsened with use of amlodipine, believed her mother should have a trial of Cymbalta which was indeed tried, patient contributes that she poorly tolerated this, family frustrated with mother's status. Daughter states her mother was fine two weeks ago and was driving a car, now is unable to function independently. Remainder of 12 point review of systems obtained in entirety negative.    Exam: patient anxious, affect flat, blood pressure 144/87, orthostatic low 96/65, temperature 97.2, pulse 102, respiratory 18. No facial asymmetry. Pharynx without erythema. Grasps her hands intermittently and wrings them somewhat. No hand drift. Thyroid without nodularity or dominant nodules. No carotid bruits. S1 and S2 regular with 1/6 systolic murmur. Pulmonary exam without rales or wheezes. Abdomen without masses tenderness organomegaly. No abdominal bruits. Lordosis, tenderness overlying PSIS and lumbosacral musculature, no sensory level. Muscle tone and strength diminished and symmetrical. No calf tenderness or swelling. No hand drift. No tremor or muscular fasciculations. Skin turgor intact. No hand drift.    Impression and plan: orthostatic hypotension,  amlodipine currently 2.5 mg a.m. and 5 mg p.m., hydralazine 10 mg TID, orthostasis continues, begin ace wraps on a.m. off p.m., continue to monitor blood pressure, no falls have occurred. Questionable cardiac amyloidosis, doubt contribution to current state of affairs. Consideration of florinef 0.1 mg if necessary for hypotension. Significant anxiety, previously on anxiolytic, previously on Zoloft which she tolerated well, consider BuSpar initiating at low dose. Chronic low back pain, muscle rub will be  applied to area. Known adrenal adenoma which is non-functional. Failure to thrive and lack of independence, continue with PT OT. Hypothyroidism on replacement with satisfactory TSH. Insomnia, trazodone causing excessive drowsiness, discontinue trazodone, continue melatonin at 3 mg QHS. Multiple medical concerns are reviewed in detail, total time of evaluation greater than 45 minutes, greater than 50% of time spent in coordination of care and counseling.    Electronically signed by: Shari Aguilera MD

## 2021-06-11 NOTE — PROGRESS NOTES
Updates:  7/11/17- Pt is in the TCU, spoke with her PCP who spoke to pt's daughter, looking into additional HC services or LTC. Will call daughter to see if resources are needed.   6/5/17- Met with Pt and daughter Alysa in the clinic today. Pt only had 3 hours of sleep and hasn't been feeling well due to medication changes, sleepless nights, and pain. Pt's daughter said that she has been on the decline the past 6 months. Pt had another medication adjustment today and would like to know when to come in for a f/u. Pt also received a referral from pcp for stockings and wheelchair. Pt's daughter would like to know how insurance works for that. She would like a call for update with those referrals.   5/31/17- Pt agreed to meet me after apt on 6/5 with pcp.   5/1/17-Patient called me today because she is feeling very dizzy and nauseous after taking her gabapentin. We spoke about this same issue on Thursday, April 27th (see note). She has been taking a different dosage of the pill every night depending on how she feels. She is confused as to why she is even taking this medication so I advised her to call the  pain/spine clinic to clarify what she needs to do. She said the clinic did not return her phone call last Friday (there is a note that they spoke) so I called the clinic and spoke to BOBBY Martin [phone]. She said she would call the patient and have her come into their clinic since she is not well. The patient agreed to see the MOO Epperson, so I scheduled her an appointment for May 2nd.

## 2021-06-11 NOTE — PROGRESS NOTES
Updates:  5/31/17- Pt agreed to meet me after apt on 6/5 with pcp.   5/1/17-Patient called me today because she is feeling very dizzy and nauseous after taking her gabapentin. We spoke about this same issue on Thursday, April 27th (see note). She has been taking a different dosage of the pill every night depending on how she feels. She is confused as to why she is even taking this medication so I advised her to call the  pain/spine clinic to clarify what she needs to do. She said the clinic did not return her phone call last Friday (there is a note that they spoke) so I called the clinic and spoke to BOBBY Martin [phone]. She said she would call the patient and have her come into their clinic since she is not well. The patient agreed to see the MOO Epperson, so I scheduled her an appointment for May 2nd.

## 2021-06-11 NOTE — PROGRESS NOTES
SUBJECTIVE:   Chief Complaint   Patient presents with     Follow-up     recent visit; discuss med changes and insomnia     Mehreen Camara 77 y.o. female    Current Outpatient Prescriptions   Medication Sig Dispense Refill     acetaminophen (TYLENOL) 500 MG tablet Take 500 mg by mouth every 6 (six) hours as needed for pain.       amLODIPine (NORVASC) 5 MG tablet Take 1 tablet (5 mg total) by mouth 2 (two) times a day. 60 tablet 2     docusate sodium (COLACE) 100 MG capsule Take 1 capsule (100 mg total) by mouth 2 (two) times a day as needed for constipation. 10 capsule 0     hydrALAZINE (APRESOLINE) 10 MG tablet Take 1 tablet (10 mg total) by mouth 4 (four) times a day as needed (for very high blood pressures). (Patient taking differently: Take 10 mg by mouth 3 (three) times a day. ) 360 tablet 3     levothyroxine (SYNTHROID, LEVOTHROID) 137 MCG tablet Take 1 tablet (137 mcg total) by mouth Daily at 6:00 am. 90 tablet 0     losartan (COZAAR) 50 MG tablet Take 1 tablet (50 mg total) by mouth 2 (two) times a day. 60 tablet 0     MULTIVIT-MINERALS/FOLIC ACID (ADULT MULTIVITAMIN GUMMIES ORAL) Take 2 tablets by mouth daily.       omeprazole (PRILOSEC) 20 MG capsule TAKE ONE CAPSULE BY MOUTH TWICE DAILY (Patient taking differently: Take 20 mg by mouth 2 (two) times a day before meals. ) 180 capsule 3     polyethylene glycol (GLYCOLAX) 17 gram/dose powder Take 17 g by mouth daily. Mix with water 255 g 0     PROPYLENE GLYCOL//PF (SYSTANE, PF, OPHT) Administer 1 drop to both eyes 4 (four) times a day as needed.        traZODone (DESYREL) 50 MG tablet One at bedtime if needed for insomnia 30 tablet 0     DULoxetine (CYMBALTA) 20 MG capsule Take 1 capsule (20 mg total) by mouth daily. 30 capsule 0     losartan (COZAAR) 50 MG tablet TAKE ONE TABLET BY MOUTH TWICE DAILY 180 tablet 3     sertraline (ZOLOFT) 25 MG tablet Take 1 tablet (25 mg total) by mouth daily. 30 tablet 0     No current facility-administered  medications for this visit.      Allergies: Penicillins; Atenolol; Atorvastatin; Codeine; Ibuprofen; Lovastatin; and Cephalexin   No LMP recorded. Patient has had a hysterectomy.    HPI:   Pleasant 77-year-old here with daughter for follow-up of multiple issues.    She has had a lot of difficulty over the last 6 months with frequent lightheadedness and labile blood pressures.  She has had numerous medication changes trying to optimize her regimen to minimize lightheadedness and labile blood pressures.  During a recent hospitalization she had multiple medication changes at once, including discontinuing her carvedilol and stopping her Zoloft.  Amlodipine twice daily was added.  She has been concerned since that time about elevated heart rate, often in the 90s, which is higher than she used to run.  It has now been almost a month since carvedilol was changed, and elevated heart rate persists.  No chest pain or shortness of breath, no palpitations or irregular beats.  Patient is also struggling more with her anxiety since stopping Zoloft.  She has not been sleeping well at night and this has been a major contributing factor to her not feeling well during the day.  She recently tried trazodone 50 mg at night, which has not helped.    Patient brings blood pressure and pulse log in with her today.  Generally running in acceptable range.  Feels like she gets most lightheaded within the 3 hours after taking her amlodipine, especially noticing this in the morning, since she is going to bed after the evening dose.  During hospitalization a month ago she was noted to have frequent orthostatic hypotension, but significant supine hypertension overnight.    Patient had been following with the spine clinic for back and hip pain.  She received an injection, which helped with some aspects of her pain, but she is still struggling with some back and hip pain.    She has gotten to the point where she feels like she needs to use a walker  because of feeling generally weak, and frequent episodes of lightheadedness, causing concern that she might fall.  Also due to the back and hip pain, walker would help give additional support instability.    Has had some bilateral lower extremity edema and interested in compression stockings.  She is currently on Norvasc 5 mg twice daily, which is new since hospital discharge a month ago.    Patient has an upcoming neurology appointment to evaluate for neuro degenerative disorder or autonomic disorder as the cause of her symptoms.  She has her EGD tomorrow for history of Harris's esophagus, as well as early satiety and bloating.  She has had CT abdomen pelvis that did not show cause for these symptoms.  Review of past GI notes show that she had complained of this 3-4 years ago when being seen there as well, so not clear how new this symptom is, but patient does feel like it is new.    ROS: as per HPI    OBJECTIVE:   /78 (Patient Site: Left Arm, Patient Position: Sitting, Cuff Size: Adult Regular)  Pulse 68  Temp 97.8  F (36.6  C) (Oral)   Resp 16  Wt 163 lb 4.8 oz (74.1 kg)  BMI 26.36 kg/m2    General: Pleasant, well-appearing, in no acute distress.  HEENT: Oropharynx with moist mucous membranes.  Sclerae clear..  Cardiovascular: Heart regular rate and rhythm, normal S1-S2, no murmurs, rubs, or gallops  Respiratory: Lungs are clear to auscultation bilaterally without wheezes or crackles.  Good air movement throughout.  No increased work of breathing.  Extremities: Warm and well perfused without edema  Skin: No jaundice or pallor.  Psychiatric: On time and well groomed.  Normal speech and thought content.  Good eye contact.      ASSESSMENT/PLAN  1. Essential hypertension with goal blood pressure less than 140/90  Refill of her amlodipine provided but she was advised to hold the morning dose to see if it makes any difference in her increased lightheadedness that she gets for a few hours after taking that  medication.  She should continue to take her evening dose before bed.  She should let me know if blood pressures are running high after holding this amlodipine dose.  Should also let me know if lightheadedness does not improve, which would mean it is not due to that medication and she should resume it.  Patient and daughter expressed understanding and agreement.  - amLODIPine (NORVASC) 5 MG tablet; Take 1 tablet (5 mg total) by mouth 2 (two) times a day.  Dispense: 60 tablet; Refill: 2    2. Back pain, hip pain  Advised her to follow-up with spine clinic  - Ambulatory referral to [facility]    3.  Frequent episodes of lightheadedness  Has had extensive workup.  Felt to be related to orthostatic hypotension.  She has evaluation with neurology scheduled    4. Edema  Bilateral, mild.  Do not suspect DVT or heart failure.  Suspect related to amlodipine.  Compression stockings prescribed.  - Compression stockings    5. Tachycardia  Further evaluation with Holter monitor to get a better idea of her heart rate throughout the day  - Holter Monitor; Future     6.  Insomnia  Suspect related to increased anxiety after stopping sertraline.  Try increased dose of trazodone 100 mg at night    7.  Anxiety  She agrees that she should restart something for anxiety.  She does not feel any better off of the sertraline in terms of lightheadedness, so doubt this was a significant contributing factor.  Daughter wonders about Cymbalta since she is also having pain.  Reasonable to try Cymbalta, prescription sent and instructed on how to start.    Patient with frequent episodes of lightheadedness, generalized weakness, concern for fall risk, also with hip and back pain, would benefit from a walker with a seat for stability.  Instructed to find a Medicare approved supplier of durable medical equipment, pick out what she would like, and have them fax paperwork here to be completed.

## 2021-06-11 NOTE — PROGRESS NOTES
Updates:  6/5/17- Met with Pt and daughter Alysa in the clinic today. Pt only had 3 hours of sleep and hasn't been feeling well due to medication changes, sleepless nights, and pain. Pt's daughter said that she has been on the decline the past 6 months. Pt had another medication adjustment today and would like to know when to come in for a f/u. Pt also received a referral from pcp for stockings and wheelchair. Pt's daughter would like to know how insurance works for that. She would like a call for update with those referrals.   5/31/17- Pt agreed to meet me after apt on 6/5 with pcp.   5/1/17-Patient called me today because she is feeling very dizzy and nauseous after taking her gabapentin. We spoke about this same issue on Thursday, April 27th (see note). She has been taking a different dosage of the pill every night depending on how she feels. She is confused as to why she is even taking this medication so I advised her to call the  pain/spine clinic to clarify what she needs to do. She said the clinic did not return her phone call last Friday (there is a note that they spoke) so I called the clinic and spoke to BOBBY Martin [phone]. She said she would call the patient and have her come into their clinic since she is not well. The patient agreed to see the MOO Epperson, so I scheduled her an appointment for May 2nd.

## 2021-06-12 NOTE — PROGRESS NOTES
Updates:  7/31/17- Spoke with Pt's daughter Alysa. She reported that her mother is still at the [facility]. They are looking into [facility], told her about Jorge from [facility]-will send info over email. Reported that Mehreen is having anxiety and believes that the Norvasc that was prescribed while in the hospital in may 2017 has really impacted her mother negatively and they discontinued her anxiety medication. Alysa would like PCP or MTM to be involved in Mehreen's care. Her health has been greatly impacted these past few months.  Will send message to PCP and MTM.   7/11/17- Pt is in the TCU, spoke with her PCP who spoke to pt's daughter, looking into additional HC services or LTC. Will call daughter to see if resources are needed.   6/5/17- Met with Pt and daughter Alysa in the clinic today. Pt only had 3 hours of sleep and hasn't been feeling well due to medication changes, sleepless nights, and pain. Pt's daughter said that she has been on the decline the past 6 months. Pt had another medication adjustment today and would like to know when to come in for a f/u. Pt also received a referral from pcp for stockings and wheelchair. Pt's daughter would like to know how insurance works for that. She would like a call for update with those referrals.   5/31/17- Pt agreed to meet me after apt on 6/5 with pcp.   5/1/17-Patient called me today because she is feeling very dizzy and nauseous after taking her gabapentin. We spoke about this same issue on Thursday, April 27th (see note). She has been taking a different dosage of the pill every night depending on how she feels. She is confused as to why she is even taking this medication so I advised her to call the  pain/spine clinic to clarify what she needs to do. She said the clinic did not return her phone call last Friday (there is a note that they spoke) so I called the clinic and spoke to BOBBY Martin [phone]. She said she would call the patient and  have her come into their clinic since she is not well. The patient agreed to see the MTBRAD Epperson, so I scheduled her an appointment for May 2nd.        Patient FYI:    [facility]  Contact: Latasha [phone] [email or web address]

## 2021-06-12 NOTE — PROGRESS NOTES
[facility]    Facility:   [facility] [[id number]]   Code Status: POLST AVAILABLE    Reevaluation of patient with poorly controlled hypertension, multiple hospitalizations over recent weeks for weakness, anxiety and depression, recent multiple changes in antihypertensives, recent cessation of SSRI  thought potentially to contribute to lightheadedness. Continues on losartan 50 mg Q day, hydralazine 10 mg TID, Norvasc 2.5 mg a.m., 5 mg p.m., melatonin 3 - 6 mg QHS. Recent significant neck pain. Flexeril 10 mg TID prn has been prescribed.    Review of systems: anxiety continues to be present although is improved. Depression additionally improved. Chief complaint is of right posterior neck discomfort, has taken four tablets of Flexeril, unclear if this is beneficial. Declines use of ace wraps to lower extremity, finds these to be uncomfortable. Denies cardiac or pulmonary symptoms. Continues to walk throughout the day. Concerned regarding living arrangements in the future. No active G.I. or  symptoms. Remainder of 12 point review of systems obtained negative.    Exam: variable blood pressure over past five days, typically in the 112-130 range, intermittent elevation to 170, hydralazine is held for systolic less than 160. Anxious, cognitively intact. Holds head and neck stiffly. Palpable muscular spasm right posterior. No cervical adenopathy. No carotid bruits. S1 and S2 regular. Pulmonary exam without rales or wheezes. Abdomen without masses or tenderness. Periphery without edema, muscle tone and strength minus 2 lower extremities. Ambulates with a walker, gait stable.    Impression and plan: chronic hypertension, orthostatic hypotension, blood pressure while variable is under adequate control. Patient has had no falls, no prolonged episodes of lightheadedness. Chronic insomnia, previous intolerance to trazodone. Chronic anxiety and depression, both symptomatically improved, not  receiving pharmacological treatment. Cervical tension myalgia, patient now in physical therapy, finds this to be somewhat beneficial, continue physical therapy, topical heat versus cold and Flexeril on a PRN basis. Issues of concern are reviewed with patient.      Electronically signed by: Shari Aguilera MD

## 2021-06-12 NOTE — PROGRESS NOTES
[facility]    Facility:   [facility] [[id number]]   Code Status: POLST AVAILABLE    Reevaluation of 77-year-old female admitted to hospital with lightheadedness and weakness. History of intractable anxiety and depression, multiple changes in medications over recent weeks, Norvasc changed to 2.5 mg a.m., 5 mg p.m. in hospital, transferred to TCU for rehabilitation, observation of clinical status and management of medical problems. Since transfer to TCU patient has been troubled with ongoing intractable anxiety, lightheadedness improved, obsession regarding bowel movements.    Review of systems: patient highly concerned that her bowel movements are not normal. States she has had long-standing history of issues with bowels. Took a senna tablet four days ago, believed she developed loose stools post senna, senna now discontinued, continues on MiraLAX, Metamucil has been added. Describes low-grade bloating, gassiness intermittent. Denies nausea vomiting or abdominal pain. One episode of abdominal cramping two days ago. Continues with sleep impairment, difficulty both falling asleep and early awakening. Feels anxious at all times. Believes her strength is gradually improving, however continues to have intermittent lightheadedness. Denies chest pain or palpitations. Mood is depressed at all times. Remainder of 12 point review of systems obtained negative.    Exam: patient anxious, temperature 97.8, pulse 106, respiratory 18, 02 99% on room air, blood pressure initially elevated in a.m., post medication 86/55. Distracted. Returns often to issue of bowel movements. No facial asymmetry. Extraocular movements intact. Pharynx without erythema. No carotid bruits or HJR. S1 and S2 regular. Pulmonary exam without rales rhonchi or wheezes. Abdomen without distention, no focal tenderness, no organomegaly, normal active bowl sounds. Periphery with trace nonpitting edema, pedal pulses symmetrical.  Muscle tone and strength diminished. No hand drift, no muscular fasciculations or tremor.    Impression and plan: significant deconditioning, continue with rehabilitation. Orthostatic hypotension, symptoms improved, intermittently present, patient has declined use of ace wraps to lower extremities, continues on admission antihypertensives, intermittent significant elevation of blood pressure. Chronic insomnia, melatonin is questionably beneficial, poorly tolerated trazodone in past, change melatonin to MO N. Intractable anxiety and obsessive tendencies, begin BuSpar 5 mg b.i.d., rationale for use and potential side effects reviewed with patient. Chronic depression, patient previously on Zoloft, will consider reinitiation of SSRI pending clinical course. Irritable bowel symptomatology, currently on MiraLAX and Metamucil. Care plan and medications are reviewed. Total time of evaluation greater than 35 minutes, greater than 50% of time spent in coordination of care and counseling.      Electronically signed by: Shari Aguilera MD

## 2021-06-12 NOTE — PROGRESS NOTES
[facility]    Facility:   [facility] [[id number]]   Code Status: POLST AVAILABLE    Reevaluation and discharge planning of 77 year old female admitted to hospital with weakness. Recent extensive evaluation including CT scan and MRI of brain, neurology and cardiology evaluation, endocrinology evaluation for benign adrenal adenoma, negative CT scan of abdomen and pelvis, negative extensive laboratory testing, question of cardiac amyloidosis, working diagnosis of orthostatic hypotension and failure to thrive. Chronic depression and anxiety. Transferred to TCU for rehabilitation, observation of clinical status and management of medical problems. Patient's course in TCU complicated primarily with anxiety and depression. Initially with mild orthostatic hypotensive symptoms, continued on amlodipine 2.5 mg p.m., 5 mg p.m., losartan 50 mg BID, hydralazine changed to 10 mg BID PRN systolic blood pressure greater than 160. Brief trial of BuSpar poorly tolerated by patient. Side effects of excessive fatigue. SSRI recently discontinued was not reinitiated in TCU. Intermittent concerns regarding bowel function. Trazodone discontinued with complaints of excessive fatigue, melatonin in use at 3 to 6 mg QHS PRN, multiple concerns regarding insomnia during TCU stay. No cardiac or pulmonary symptoms. No falls occurred. Patient declined use of ace wraps to lower extremities. Chronic neck pain, escalating pain right posterolateral neck while in TCU, Flexeril 10 mg TID PRN and physical therapy beneficial in relieving symptoms. Patient anticipates discharge to her daughter's home in 72 hours.    Review of systems: continued right postero- lateral neck pain gradually improving. Multiple concerns and anxiety related to moving two daughters home. Has been self administering medications without difficulty. Has begun taking Flexeril at , states this helps with her sleep. Melatonin is additionally in use.  No active bowl bladder cardiac or pulmonary symptoms. Ambulating with a walker, no falls have occurred. No focal neurologic deficits. Remainder of 12 point review of systems obtained negative.    Exam: patient anxious, ambulates with a walker, changes seating position during interview. Blood pressure 124/68, pulse 74, respiratory 16. Flat affect. No facial asymmetry. Holds head and neck stiffly. Right posterior cervical muscular tenderness. No vertebral body tenderness or sensory level. No carotid bruits or HJR. S1 and S2 regular with 2/6 systolic murmur. Pulmonary exam without rales rhonchi or wheezes. Abdomen without masses tenderness organomegaly. No hand drift or tremor. Lower extremities with nonpitting edema trace, muscle tone and strength diminished. No calf tenderness or swelling. Pedal pulses symmetrical. Joints without acute inflammatory change.    Impression and plan: chronic hypertension, current excellent control of blood pressure, previous orthostatic hypotensive symptoms resolved, continuing amlodipine and losartan, hydralazine on a PRN basis. Profound anxiety and depression, not currently receiving pharmacologic intervention. Chronic deconditioning, strength has improved during TCU stay. Chronic neck  pain, previous history of cervical surgery, acute posterior cervical tension myalgia improved. Chronic insomnia, reviewed that Flexeril is for muscle spasm, not for insomnia, continuing on melatonin PRN. Nonfunctional adrenal adenoma. Deconditioning with improvement in strength during TCU stay. Change in medications from those at admission, hydralazine 10 mg BID PRN systolic blood pressure greater than 160, melatonin 3 - 6 mg QHS PRN, trazodone discontinued, Flexeril 10 mg TID PRN neck pain. Multiple personal and medical concerns are reviewed, total time of discharge evaluation greater than 35 minutes, later than 50% of time spent in coordination of care and counseling. Follow up will be with regular  physician.      Electronically signed by: Shari Aguilera MD

## 2021-06-12 NOTE — PROGRESS NOTES
SUBJECTIVE:   Chief Complaint   Patient presents with     Follow-up     TCU follow up--discharged 8/25/17; will need medication review and refills     Paperwork     [employer] form; [employer]     Insomnia     trouble falling asleep and staying asleep; since starting amlodipine     Mehreen Camara 77 y.o. female    Current Outpatient Prescriptions   Medication Sig Dispense Refill     acetaminophen (TYLENOL) 500 MG tablet Take 500 mg by mouth every 6 (six) hours as needed for pain. Back pain       cholecalciferol, vitamin D3, 1,000 unit tablet Take 1,000 Units by mouth daily.       docusate sodium (COLACE) 100 MG capsule Take 1 capsule (100 mg total) by mouth 2 (two) times a day as needed for constipation. 30 capsule 0     fluticasone (FLONASE) 50 mcg/actuation nasal spray 1 spray into each nostril daily.       hydrALAZINE (APRESOLINE) 10 MG tablet Take 10 mg by mouth 3 (three) times a day.       lidocaine 4 % patch Apply as needed to painful area. Remove and discard patch with 12 hours or as directed by MD. 30 patch 0     losartan (COZAAR) 50 MG tablet Take 50 mg by mouth 2 (two) times a day.       melatonin 3 mg Tab tablet Take 1 tablet (3 mg total) by mouth daily with supper. 30 tablet 0     multivitamin with minerals (THERA-M) 9 mg iron-400 mcg Tab tablet Take 1 tablet by mouth daily.       omeprazole (PRILOSEC) 20 MG capsule Take 20 mg by mouth 2 (two) times a day before meals.       polyethylene glycol (GLYCOLAX) 17 gram/dose powder Take 17 g by mouth daily. Mix with water 255 g 0     PROPYLENE GLYCOL//PF (SYSTANE, PF, OPHT) Administer 1 drop to both eyes 4 (four) times a day as needed.        psyllium 3.4 gram/5.8 gram Powd Take 5 mL by mouth once daily. Indications: constipation       traZODone (DESYREL) 50 MG tablet Take 0.5 tablets (25 mg total) by mouth at bedtime. 30 tablet 0     amLODIPine (NORVASC) 2.5 MG tablet Take 1 tablet (2.5 mg total) by mouth every morning. In addition to 5mg at night 90  tablet 2     amLODIPine (NORVASC) 5 MG tablet Take 1 tablet (5 mg total) by mouth every evening. In addition to 2.5mg in the morning 90 tablet 0     FLUoxetine (PROZAC) 20 MG tablet Half tablet daily for one week, then if tolerating, increase to full tablet daily 30 tablet 0     levothyroxine (SYNTHROID, LEVOTHROID) 137 MCG tablet TAKE ONE TABLET BY MOUTH ONCE DAILY AT  6  AM 90 tablet 2     LORazepam (ATIVAN) 0.5 MG tablet One half or one tablet every 8 hours as needed for anxiety or insomnia 30 tablet 0     No current facility-administered medications for this visit.      Allergies: Penicillins; Atenolol; Atorvastatin; Codeine; Ibuprofen; Lovastatin; and Cephalexin   No LMP recorded. Patient has had a hysterectomy.    HPI:   Pleasant 77-year-old with AAA status post repair, labile hypertension, inactive adrenal adenoma, significant anxiety, chronic neck and back pain, possible cardiac amyloidosis, early dementia, who presents for transitional care follow-up.  Patient was hospitalized at [facility] from July 3-6 for worsening symptoms of lightheadedness, fatigue, and labile hypertension.  While hospitalized, MRA of the head neck was performed and did not show cause for her symptoms.  Amlodipine was increased, sertraline was again stopped for concerns that it could be contributing to her lightheadedness.  She was discharged to a transitional care unit and cared for there until 8/25/17.  For her anxiety, they attempted to start buspirone but patient had side effects.  She continued to have intermittent difficulty with constipation, occasional lightheadedness but overall significantly improved compared to previous, blood pressure overall fairly well controlled, and she had intermittent episodes of neck pain.  She was recently discharged to her daughter's home where she has been staying while waiting for assisted living facility.    She is homebound.  Needs home care for physical therapy, occupational therapy, nursing.   Unable to drive at this point, unable to leave the home without a caregiver.  Generally uses a walker, especially when out of the home.    She still has some concerns that there are side effects related to the amlodipine, now attributing her insomnia to the amlodipine.  She has attributed multiple symptoms at different times to the amlodipine.  She has struggled with insomnia for quite some time.  Has tried trazodone, which caused too much grogginess especially at higher doses, has tried melatonin, which she did not think really helped, as well as tizanidine which was also used for neck pain muscle spasm, but that medication caused side effects and she has not used it since.  She has tried Benadryl.  She is moving to an assisted living facility in a week and a half.    She has struggled significantly with anxiety.  She wonders about restarting the sertraline.  This has been stopped on 2 occasions now due to concerns that it was contributing to lightheadedness.  She does not recall being on other medications, other than BuSpar there was trialed at the transitional care facility but caused side effects.    Patient brings blood pressure readings with her from the last several days, overall looks adequately controlled.  Has only required as needed hydralazine on one occasion since being out of the transitional care unit.    ROS: as per HPI    OBJECTIVE:   /82  Pulse 96  Temp 98  F (36.7  C) (Oral)   Resp 16  Wt 157 lb 3.2 oz (71.3 kg)  BMI 25.37 kg/m2    General: Pleasant, well-appearing, in no acute distress.  HEENT: Pupils equal, round, reactive to light.  Oropharynx clear with moist mucous membranes.  Neck supple without lymphadenopathy.  Cardiovascular: Heart regular rate and rhythm, normal S1-S2, no murmurs, rubs, or gallops  Respiratory: Lungs are clear to auscultation bilaterally without wheezes or crackles.  Good air movement throughout.  No increased work of breathing.  Abdomen: Soft, nontender,  nondistended.  No guarding or rebound.  Extremities: Warm and well perfused without edema  Skin: No jaundice or pallor.  Psychiatric: Presents on time and well groomed.  Normal speech and thought content.  Good eye contact.  Restricted affect.  Appears anxious.  Perseverates over medications, blood pressure, insomnia, anxiety.  Wants to feel better right away.      ASSESSMENT/PLAN  1.  Increased agitation and irritability  Suspect related to underlying mild dementia, anxiety, exacerbated by recent environmental change, discharge from transitional care and moved into her daughter's temporarily.  UA was done to evaluate for possible UTI, and completely negative.    2.  Anxiety  Significant symptoms.  Zoloft was stopped due to concerns that it was contributing to orthostatic symptoms.  Start fluoxetine 10 mg daily, increased to 20 mg daily in a week if tolerating.  Follow-up with me in 2 weeks.  Also discussed referral to psychology and psychiatry.  Unfortunately, she is not interested in psychology referral for therapy.  She is somewhat hesitant with psychiatry as well, but I would encourage her to schedule because she likely needs to wait a month to get in.  If she gets significant improvement with the fluoxetine by then, she could always cancel.  Also discussed a temporary medication for severe anxiety or insomnia symptoms.  Lorazepam prescribed.  Risks and adverse effects discussed.  She was instructed to start this medication now while she is living with her daughter so she can see how she responds to it.      3.  Hypertension with labile blood pressures  Overall quite improved compared to previous, so strongly encouraged her to continue with current medication regimen and would discourage any changes to her antihypertensive regimen at this time.  Patient advised that I do not believe the amlodipine is causing any worrisome or problematic side effects at this time.  Over the last few weeks they have had concerns  about amlodipine causing various different side effect symptoms, all of which have been ongoing since prior to starting amlodipine.  They also request referral to cardiology for difficult to manage blood pressure and ongoing episodic lightheadedness.  Referral placed.  - Ambulatory referral to Cardiology    4.  Insomnia  Consider a sleep medicine referral.  Suspect this will improve significantly as anxiety improves, which hopefully happens with the fluoxetine.  In the meantime, she is given a prescription for lorazepam to be used as needed for anxiety or insomnia.    They need paperwork completed for VA assistance.  She is currently homebound as described above.  Due to her anxiety, she would benefit from living in a senior living facility, where she could have easy access to other people for social engagement, which would benefit both her anxiety and her cognitive decline.  She would benefit from structure and daily routines and activities that could be offered through a senior living facility.  She is not currently driving and would benefit from transportation provided by a senior facility.  She would benefit from in-home care specifically physical therapy, occupational therapy, nursing, home health aide.  She would benefit from living in a senior living facility where she could get regular exercise indoors, even just walking, right on-site at her living facility.    40 minutes spent, greater than 50% in discussion and counseling regarding the above.

## 2021-06-12 NOTE — PROGRESS NOTES
[facility] Patient Outreach:     Discharge Date::  8/25/17      Reason for TCU stay (discharge diagnosis)::  Weakness, HTN, anxiety, depression      Are you feeling better, the same or worse since your discharge?:  Patient is feeling better (Physically doing fine, however she's very argumentative and verbally abusive with family.  )          As part of your discharge plan, did they discuss home care with you?: Yes        Have your seen them yet, or are they scheduled to visit?: Yes                Do you have any follow up visits scheduled with your PCP or Specialist?:  Yes, with PCP      (RN) Is it scheduled soon enough (3-5 days)?: No (Due to increasing agitation, writer recommends seeing a provider sooner.  )        (RN) Is the patient okay with moving appointment up (if RN feels appropriate)?: Yes (Patient's daughter ok with it.  )            (RN) Patient transferred to [facility]? **If immediate concers (e.g. patient is feeling worse and/or not taking new medictations), send in basket message to PCP with quick summary of concern.: Yes
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
## 2021-06-12 NOTE — PROGRESS NOTES
[facility]    Facility:   [facility] [[id number]]   Code Status: POLST AVAILABLE       Reevaluation of 77-year-old female with multiple recent hospitalizations for lightheadedness, extensive evaluations, symptoms attributed to orthostatic hypotension, stabilized in hospital and transferred to TCU for rehabilitation, observation of clinical status. Continues on losartan, hydralazine, Norvasc. Recent significant cervical tension myalgia, Flexeril has been ordered, patient has not taken medication.    Review of systems: persistent neck discomfort, spasm right posterior cervical, difficulty turning head. Has not taken Flexeril, was concerned that she might have side effects from medication. Continues to feel nervous at all times, denies panic disorder. Attempts to walk throughout the day. Using a walker, in room attempting to walk without assistance. No definitive symptoms of orthostasis. Mood diminished. Lacks dat in all activities. Fatigue does she was up during the night with discomfort. Remainder of 12 point review of systems obtained negative.    Exam: vital signs reviewed over past five days. Affect flat, mood depressed. Holds neck and head stiffly. Ambulates with forward positioning of upper thoracic and head region. No facial asymmetry. Mucous membranes moist. S1 and S2 irregular. Pulmonary exam without rales or rhonchi. No carotid bruits. Abdomen without masses tenderness organomegaly. Periphery without edema. Muscle tone and strength diminished and symmetrical lower extremities. No hand drift, hand grasp symmetrical.    Impression and plan: chronic hypertension, continues on losartan 50 mg, hydralazine 10 TID, Norvasc 5 mg PM, 2.5 mg AM, satisfactory control of blood pressure. Chronic orthostatic hypotension, resolving symptoms. Acute cervical tension myalgia, heat and cold have not provided relief, no neurologic deficit, encouraged trial of Flexeril. Chronic anxiety and  depression, symptoms remain present, patient previously had adverse affects from BuSpar (fatigue), not currently on SSRI. Care plan and medications are reviewed and without change.        Electronically signed by: Shari Aguilera MD

## 2021-06-12 NOTE — TELEPHONE ENCOUNTER
----- Message from Parag Forrest MD (Ted) sent at 11/5/2020  3:42 PM CST -----  Moderate aortic stenosis only, reassurance, no new orders  ----- Message -----  From: Sheila Mccray  Sent: 11/5/2020   3:39 PM CST  To: Parag Forrest MD (Ted)    Called results to daughter Irasema. She verbalized understanding of stable results and they will follow up in 1 year as previously discussed. -[facility] [place]

## 2021-06-12 NOTE — PROGRESS NOTES
[facility]    Facility:   [facility] [[id number]]   Code Status: POLST AVAILABLE    Repeat evaluation of 77-year-old female continuing in TCU you post hospitalization for management of hypertension. History of ortho static hypotension, difficult to control hyper tension, anxiety and depression. Recent neck discomfort, awaiting placement in daughter's home.    Review of systems: patient continues to have significant anxiety. Has taken Flexeril at  for discomfort, believes it may be helpful in controlling symptoms, additionally attending physical therapy. New onset of sense of diminished vision, states her vision suddenly became worse, is followed by ophthalmology on a regular basis. Denies other acute neurologic symptoms. Denies fever sweats or chills. No chest pain or palpitations. Feels weak at all times. Worries excessively about a number of issues. No anticipates transferring to her daughter's home, concerned regarding this situation. Remainder of 12 point review of systems obtained negative.    Face-to-face documentation regarding need for walker. Patient with orthostatic hypotension, generalized weakness, deconditioning, requires a walker for safe ambulation. Patient unable to safely ambulate without a walker due to fall risk.    Exam: patient anxious, fretful about a number of issues. Temperature 97.9, heart rate 90, respiratory 18, blood pressure 124/80, 02 97% on room air. Holds head stiffly, modest tenderness right posterior cervical musculature, decreased tension of musculature in comparison to previous exams. PERRLA, EOMI I. S1 and S2 regular with 2/6 systolic murmur. Pulmonary exam without rales rhonchi or wheezes. No hand drift. Pharynx without erythema. Abdomen without masses or tenderness. Periphery without edema. Muscle tone and strength diminished and symmetrical. Gait observed slow and without ataxia.    Impression and plan: chronic hypertension, hydralazine  has been used rarely, held for blood pressure less than 160, change to PRN b.i.d. 10 mg hydralazine for systolic blood pressure greater than 160. Chronic deconditioning, continue rehabilitation measures. Chronic profound anxiety, anxiety continues to be symptomatic. Face-to-face documentation regarding need for walker. Acute neck  strain, Flexeril beneficial, physical therapy improving symptoms. Complaints of change of vision, no acute neurologic event identified, encouraged follow up with eye physician. Multiple concerns are reviewed with patient with total time of evaluation greater than 35 minutes, greater than 50% of time spent in coordination of care and counseling.      Electronically signed by: Shari Aguilera MD

## 2021-06-12 NOTE — PROGRESS NOTES
[facility]    Facility:   [facility] [[id number]]   Code Status: POLST AVAILABLE       Reevaluation of 77-year-old female with chronic hypertension, profound anxiety and depression, multiple recent changes in medications and recent hospitalizations for lightheadedness, final diagnosis orthostatic changes. Transferred to TCU for rehabilitation, pain management, management of medical problems.    Review of systems: continued significant cervical discomfort and spasm. Previous history of cervical spine surgery, denies injury to the neck, awakened with pain in the neck, unable to fully bend. Has been using heat and cold to area without improvement in symptoms. Denies radicular symptoms or muscular weakness upper extremities. Continues to intermittently note lightheadedness, no definitive orthostatic changes. Denies chest pain or palpitations. Fatigue is present. Continues to feel anxious at all times. Remainder of 12 point review of systems obtained negative.    Exam: temperature 97.6, pulse 95, respiratory 18, blood pressure 113/71, 02 93% on room air. Patient holding head and neck stiffly, limited side bending and range of motion of neck. No vertebral body tenderness or sensory level. No facially asymmetry. Mucous membranes moist. S1 and S2 regular. Pulmonary exam without rales rhonchi or wheezes. Abdomen without masses tenderness organomegaly. Periphery without edema. Muscle tone and strength diminished and symmetrical upper and lower extremity. No hand drift.    Impression and plan: acute cervical tension myalgia, no neurologic or motor deficit, add Flexeril 5 mg TID PRN spasm. Chronic hypertension, tolerating current antihypertensives. Chronic deconditioning with ongoing need for rehabilitation. Chronic anxiety and depression, mood stable of late. Patient continues to ambulate on a regular basis, multiple concerns regarding health are reviewed. Issues reviewed with nursing  staff.      Electronically signed by: Shari Aguilera MD

## 2021-06-12 NOTE — PROGRESS NOTES
[facility]    Facility:   [facility] [[id number]]   Code Status: POLST AVAILABLE    Reevaluation of 77-year-old female admitted to hospital with generalized weakness and dizziness, extensive evaluation including cardiac neurologic and general medicine. Orthostatic hypotension as chief finding, known nonfunctioning adrenal adenoma, blood pressure medications altered, taking Cozaar 50 mg b.i.d., Norvasc 2.5 a.m. 5 PM, hydralazine 10 mg TI D, transferred to TCU for rehabilitation, observation of neurologic status. History of chronic intractable anxiety and depression, previously on Zoloft recently discontinued, recent complaints of loose stools following use of senna, prescribed Metamucil with MiraLAX, recent addition of BuSpar 5 mg BID.    Review of systems: patient reports she intermittently becomes lightheaded, this typically with standing, believes she might be less jittery with BuSpar, however believes BuSpar is causing excessive fatigue. She continues to debate what medication she should take for her bowel, chronically has taken MiraLAX, decided perhaps Metamucil should not be taken, concerned regarding possibility of developing a bowel obstruction. Denies chest pain or palpitations. Feels anxious at all times. Frequently walks about facility. No exertional chest discomfort. No focal neurologic deficits. Remainder of 12 point review of systems obtained negative. Patient is seen on a second occasion with her daughter at daughters request, daughter has belief that her mother is no better, states her mother has been unwell for six months, nothing is being done to help her, angry, daughter strongly believes Norvasc is the wrong medication for her mother to be taking.    Exam: blood pressure 115/75, pulse 97, 02 94%. Multiple blood pressures are reviewed taken over the past four days. Patient anxious, oriented, frequently ambulating in hallway with a walker, gait stable. Affect  flat. No facial asymmetry. No carotid bruits. S1 and S2 regular. Pulmonary exam without rales or wheezes. Abdomen without masses tenderness. No hand drift or tremor. Periphery without edema. Poor muscle tone and strength lower extremity. No calf tenderness or swelling.    Impression and plan: chronic hypertension, ortho static hypotension, prolonged discussion regarding antihypertensives, daughter displeased that a number of physicians have cared for her mother, states her mother's regular physician works with a pharmacist, suggested making appointment with cardiologist to assure one clinician is following mother's blood pressure and adjusting medications. Chronic deconditioning, ongoing need for rehabilitation. Intractable anxiety, patient willing to continue on BuSpar, however believes it is causing excessive fatigue. Variable bowel movements, satisfactory abdominal examination, continue MiraLAX, patient declines further use of Metamucil. Multiple concerns are reviewed in detail with total time of evaluation, patient seen on two occasions, greater then 60 minutes, greater than 50% of time spent in coordination of care and counseling. Continue current antihypertensives without change.      Electronically signed by: Shari Aguilera MD

## 2021-06-12 NOTE — PATIENT INSTRUCTIONS - HE
Mehreen Camara,    It was a pleasure to see you today at the [facility].     My recommendations after this visit include:    truman Forrest MD, FACC, CLARI

## 2021-06-12 NOTE — TELEPHONE ENCOUNTER
Wellness Screening Tool  Symptom Screening:  Do you have one of the following NEW symptoms:    Fever (subjective or >100.0)?  No    A new cough?  No    Shortness of breath?  No     Chills? No     New loss of taste or smell? No     Generalized body aches? No     New persistent headache? No     New sore throat? No     Nausea, vomiting, or diarrhea?  No    Within the past 2 weeks, have you been exposed to someone with a known positive illness below:    COVID-19 (known or suspected)?  No    Chicken pox?  No    Mealses?  No    Pertussis?  No    Patient notified of visitor policy- They may have one person accompany them to their appointment, but they will need to wear a mask and will be screened upon arrival for symptoms: Yes  Pt informed to wear a mask: Yes  Pt notified if they develop any symptoms listed above, prior to their appointment, they are to call the clinic directly at [phone] for further instructions.  Yes  Patient's appointment status: Patient will be seen in clinic as scheduled on 10/23/20

## 2021-06-12 NOTE — PROGRESS NOTES
[facility]    Facility:   [facility] [[id number]]   Code Status: POLST AVAILABLE         Reevaluation of patient with chronic severe anxiety, admitted to hospital with lightheadedness, long-standing history of hypertension, extensive evaluation in hospital with diagnosis of orthostatic hypotension. Transferred to TCU for rehabilitation, management of medical problems. Recent complaints of loose stools, gradually resolving, ongoing intractable anxiety, BuSpar initiated one week ago at 5 mg b.i.d.    Review of systems: persistent fatigue which patient attributes to BuSpar. Believes perhaps BuSpar has improved anxiety, however is concerned regarding fatigue. Rarely experiencing lightheadedness, no focal neurologic symptoms, anxiety continues at all times, no chest pain or palpitations. No falls have occurred. Concerned regarding long-term placement, believe she cannot care for herself. Continues to be highly concerned regarding use of medications, questions if she should restart Zoloft or use an alternate medication for control of her anxiety. Denies panic attacks. Has decided to delay making appointment with the cardiologist until she is discharged from the TCU. Potential benefit of having one individual managing her antihypertensives was reviewed 48 hours ago with patient and a daughter. Remainder of 12 point review of systems obtained negative.    Exam: patient oriented times three, anxious, pressure 112/79, 114/78, the second blood pressure taken with patient's complaints of lightheadedness. No facial asymmetry. Mucous membranes moist. No pharyngeal erythema. No tremor. S1 and S2 regular. Pulmonary exam without rales rhonchi or wheezes. Abdomen without masses or tenderness. Periphery with trace venous stasis changes. No calf tenderness or swelling. Strength and muscle tone diminished and symmetrical upper and lower extremity. No muscular fasciculations.    Impression and plan:  chronic intractable anxiety, patient believes BuSpar may have improved her symptoms, however complains of fatigue with BuSpar, BuSpar to be discontinued. Chronic hypertension difficult to control, orthostatic hypotension, no orthostatic blood pressure findings over past 48 hours, blood pressure with adequate control continuing on losartan Norvasc and hydralazine. Chronic deconditioning, continue rehabilitation, patient is walking frequently throughout the facility. Concerns regarding sensitivity to medications, concerns regarding potential alternate treatments for anxiety, no further medications will be initiated at this time for management of anxiety. Total time of evaluation greater then 35 minutes, greater than 50% of time spent in coordination of care and counseling.      Electronically signed by: Shari Aguilera MD

## 2021-06-13 NOTE — PROGRESS NOTES
"Cardiology Progress Note    Assessment:  Hypertension, recumbent with pronounced orthostatic blood pressure drop likely related to autonomic dysfunction and rigidity of central vasculature; amyloidosis is possible although EKG is more suggestive of hypertensive heart disease then protein deposition  Peripheral edema secondary to Florinef and high-dose amlodipine  History of abdominal aortic aneurysm stenting  Aortic stenosis, mild to moderate    Plan:  I would like to simplify her medications.  I believe that Florinef and high-dose amlodipine are deleterious to her overall condition. I asked her to stop taking Florinef and decrease dose of amlodipine to 2.5 mg twice a day.  We should permit higher recumbent blood pressure in order to avoid disabling symptoms related to orthostatic blood pressure drops.  Follow-up in 4 weeks; may consider cardiac MR to rule out amyloidosis    Subjective:   This is 77 y.o. female who comes in today for follow-up from recent hospitalization.  She was admitted with  orthostatic hypotension.  She was seen by Dr. Bhatt and Dr. Smith.  She was started on Florinef and high-dose amlodipine.  She has developed progressive swelling of lower extremities.  Overall she has not been feeling very well.  She continues to feel dizzy when she sits up or starts moving around quickly.  She has not had PND, orthopnea.  He denies chest pain    Review of Systems:   General: Fever, Night Sweats, Weight Gain, Weight Loss     Ears/Nose/Throat: Hearing Loss  Lungs: WNL  Heart: Leg Swelling  Stomach: Constipation, Diarrhea, Heartburn  Bladder: Frequent Urination at Night  Muscle/Joints: Joint Pain, Muscle Weakness, Muscle Pain  Skin: WNL  Nervous System: Daytime Sleepiness, Dizziness, Loss of Balance  Mental Health: Confusion, Anxiety, Depression     Blood: Easy Bruising    Objective:   /60 (Patient Site: Left Arm, Patient Position: Sitting, Cuff Size: Adult Large)  Pulse 68  Resp 18  Ht 5' 6\" (1.676 m)  " Wt 157 lb (71.2 kg)  BMI 25.34 kg/m2  Physical Exam:  GENERAL: no distress  NECK: No JVD  LUNGS: Clear to auscultation.  CARDIAC: regular rhythm, S1 & S2 normal.  No heaves, thrills, gallops soft ejection murmur at the aortic area  ABDOMEN: flat, negative hepatosplenomegaly, soft and non-tender.  EXTREMITIES: No evidence of cyanosis, clubbing 1+ edema.    Current Outpatient Prescriptions   Medication Sig Dispense Refill     acetaminophen (TYLENOL) 500 MG tablet Take 500 mg by mouth every 6 (six) hours as needed for pain. Back pain       cholecalciferol, vitamin D3, 1,000 unit tablet Take 1,000 Units by mouth daily.       citalopram (CELEXA) 10 MG tablet 5 mg daily for 7 days, then 10 mg daily for 7 days, then 15 mg daily for 7 days, then start 20 mg daily long term 21 tablet 0     [START ON 10/17/2017] citalopram (CELEXA) 20 MG tablet Take 1 tablet (20 mg total) by mouth daily. 90 tablet 0     dicyclomine (BENTYL) 20 mg tablet Take 20 mg by mouth every 6 (six) hours. Indications: Irritable Bowel Syndrome       docusate sodium (COLACE) 100 MG capsule Take 1 capsule (100 mg total) by mouth 2 (two) times a day as needed for constipation. 30 capsule 0     fluticasone (FLONASE) 50 mcg/actuation nasal spray 1 spray into each nostril daily as needed for allergies.        levothyroxine (SYNTHROID, LEVOTHROID) 137 MCG tablet Take 137 mcg by mouth daily.       LORazepam (ATIVAN) 0.5 MG tablet TAKE ONE-HALF TO ONE TABLET BY MOUTH EVERY 8 HOURS AS NEEDED FOR ANXIETY OR  INSOMNIA 30 tablet 0     losartan (COZAAR) 50 MG tablet Take 50 mg by mouth 2 (two) times a day.       melatonin 3 mg Tab tablet Take 1 tablet (3 mg total) by mouth daily with supper. 30 tablet 0     metoprolol tartrate (LOPRESSOR) 25 MG tablet Take 1 tablet (25 mg total) by mouth 2 (two) times a day. 60 tablet 1     multivitamin with minerals (THERA-M) 9 mg iron-400 mcg Tab tablet Take 1 tablet by mouth daily.       omeprazole (PRILOSEC) 20 MG capsule Take 20  mg by mouth 2 (two) times a day before meals.       polyethylene glycol (GLYCOLAX) 17 gram/dose powder Take 17 g by mouth daily. Mix with water 255 g 0     PROPYLENE GLYCOL//PF (SYSTANE, PF, OPHT) Administer 1 drop to both eyes 4 (four) times a day as needed.        psyllium 3.4 gram/5.8 gram Powd Take 5 mL by mouth at bedtime.        amLODIPine (NORVASC) 2.5 MG tablet Take 1 tablet (2.5 mg total) by mouth 2 (two) times a day. 60 tablet 12     dicyclomine (BENTYL) 20 mg tablet Take 1 tablet (20 mg total) by mouth every 6 (six) hours. 20 tablet 0     No current facility-administered medications for this visit.        Cardiographics:    ECG: Sinus rhythm LVH voltage    Echocardiogram: May 2017    Left Ventricle: Normal size.The calculated left ventricular ejection fraction is 71%. This represents a normal ejection fraction. Moderate hypertrophy noted. E/e' > 15, suggesting elevated LV filling pressures.    Aortic Valve: The valve is tricuspid. There is mild global calcification with reduced excursion of the aortic valve present. Mild to moderate stenosis.    Texture of myocardium and small pericardial effusion raises possibility of cardiac amyloidosis    Stress Test: November 2015  1. Lexiscan stress nuclear study is negative for inducible myocardial   ischemia or infarction.       Lab Results:     Results from last 7 days  Lab Units 10/02/17  1220   LN-SODIUM mmol/L 140   LN-POTASSIUM mmol/L 4.5   LN-CHLORIDE mmol/L 102   LN-CO2 mmol/L 31   LN-BLOOD UREA NITROGEN mg/dL 10   LN-CREATININE mg/dL 0.58*   LN-CALCIUM mg/dL 8.9     Lab Results   Component Value Date    CHOL 256 (H) 09/22/2015    CHOL 241 (H) 03/10/2015    CHOL 252 (H) 11/10/2014     Lab Results   Component Value Date    HDL 43 09/22/2015    HDL 43 03/10/2015    HDL 35 (L) 11/10/2014     Lab Results   Component Value Date    LDLCALC 195 (H) 09/22/2015    LDLCALC 168 (H) 03/10/2015    LDLCALC 174 (H) 11/10/2014     Lab Results   Component Value Date     TRIG 91 09/22/2015    TRIG 149 03/10/2015    TRIG 216 (H) 11/10/2014     No components found for: CHOLHDL  BNP   Date Value Ref Range Status   10/25/2015 69 0 - 137 pg/mL Final       Parag (Hector)  MD Adriane

## 2021-06-13 NOTE — PROGRESS NOTES
SUBJECTIVE:   Chief Complaint   Patient presents with     Hospital Visit Follow Up     [facility] 9/22-9/26; Ortho Hypotension     Follow-up     from prev visit     Mehreen Camara 77 y.o. female    Current Outpatient Prescriptions   Medication Sig Dispense Refill     acetaminophen (TYLENOL) 500 MG tablet Take 500 mg by mouth every 6 (six) hours as needed for pain. Back pain       amLODIPine (NORVASC) 5 MG tablet Take 1 tablet (5 mg total) by mouth 2 (two) times a day. 60 tablet 2     cholecalciferol, vitamin D3, 1,000 unit tablet Take 1,000 Units by mouth daily.       citalopram (CELEXA) 10 MG tablet 5 mg daily for 7 days, then 10 mg daily for 7 days, then 15 mg daily for 7 days, then start 20 mg daily long term 21 tablet 0     [START ON 10/17/2017] citalopram (CELEXA) 20 MG tablet Take 1 tablet (20 mg total) by mouth daily. 90 tablet 0     dicyclomine (BENTYL) 20 mg tablet Take 1 tablet (20 mg total) by mouth every 6 (six) hours. 20 tablet 0     dicyclomine (BENTYL) 20 mg tablet Take 20 mg by mouth every 6 (six) hours. Indications: Irritable Bowel Syndrome       docusate sodium (COLACE) 100 MG capsule Take 1 capsule (100 mg total) by mouth 2 (two) times a day as needed for constipation. 30 capsule 0     fludrocortisone (FLORINEF) 0.1 mg tablet Take 1 tablet (0.1 mg total) by mouth daily. 30 tablet 1     fluticasone (FLONASE) 50 mcg/actuation nasal spray 1 spray into each nostril daily as needed for allergies.        levothyroxine (SYNTHROID, LEVOTHROID) 137 MCG tablet Take 137 mcg by mouth daily.       LORazepam (ATIVAN) 0.5 MG tablet TAKE ONE-HALF TO ONE TABLET BY MOUTH EVERY 8 HOURS AS NEEDED FOR ANXIETY OR  INSOMNIA 30 tablet 0     losartan (COZAAR) 50 MG tablet Take 50 mg by mouth 2 (two) times a day.       melatonin 3 mg Tab tablet Take 1 tablet (3 mg total) by mouth daily with supper. 30 tablet 0     metoprolol tartrate (LOPRESSOR) 25 MG tablet Take 1 tablet (25 mg total) by mouth 2 (two) times a day. 60  tablet 1     multivitamin with minerals (THERA-M) 9 mg iron-400 mcg Tab tablet Take 1 tablet by mouth daily.       omeprazole (PRILOSEC) 20 MG capsule Take 20 mg by mouth 2 (two) times a day before meals.       polyethylene glycol (GLYCOLAX) 17 gram/dose powder Take 17 g by mouth daily. Mix with water 255 g 0     PROPYLENE GLYCOL//PF (SYSTANE, PF, OPHT) Administer 1 drop to both eyes 4 (four) times a day as needed.        psyllium 3.4 gram/5.8 gram Powd Take 5 mL by mouth at bedtime.        No current facility-administered medications for this visit.      Allergies: Penicillins; Atenolol; Atorvastatin; Codeine; Ibuprofen; Lovastatin; and Cephalexin   No LMP recorded. Patient has had a hysterectomy.    HPI:   Pleasant 77-year-old here with her daughter for hospital follow-up visit.  Was hospitalized June 21 at 26 at [facility] for orthostatic hypotension, also had abdominal pain secondary to his partial small bowel obstruction.  Was seen by cardiology, [place] [facility], and psychiatry (for anxiety).  For her severe orthostatic hypotension and supine hypertension, her blood pressure regimen was adjusted.  She was started on metoprolol tartrate 25 mg twice daily.  Had previously been on beta-blockers, which were discontinued during hospitalization a few months ago for concern it was blunting her heart rate response to hypotension and exacerbating lightheaded symptoms.  She has been tolerating the metoprolol since it was started.  Her amlodipine dose was increased shortly before discharge due to some episodes of severe hypertension.  She is now on amlodipine 5 mg twice daily.  She has been continued on her previous dose of losartan 50 mg twice daily.  Cardiology added fludrocortisone 0.1 mg every morning.  For anxiety, Prozac was discontinued for concerns that it contributed to her hypotension, and Celexa was started at a low dose with titration up to a goal dose of 20 mg daily.  Patient has been  compliant with this medication regimen since hospital discharge.  She is concerned about fluid retention, feels that her abdomen is bloated, also reports increased pedal and ankle edema that is quite bothersome to her.  The abdominal bloating is also very bothersome.  Also reporting some mild urinary retention, where she feels she cannot urinate easily in the morning, but by the afternoon she is able to urinate without difficulty.  Also complaining of visual symptoms, the way she describes it it is not clear if it is blurry vision or diplopia, she does not believe she is actually seeing double, but is convinced it is related to the amlodipine.  States that it seems to worsen for a period of time after taking the amlodipine dose in the morning and in the evening, and at times wears off and her vision is normal.  Does not believe it is related to inability to focus when looking from far to near.  States the vision symptom has been ongoing for quite some time, she believes since being on the amlodipine, and [facility] as [facility].    She requires a cane or a walker for ambulation.  Episodes of severe hypotension in the past putting her at risk for falls or syncope.  Would benefit from [facility].  Currently has home care services, skilled nursing and home health aide, PT OT to evaluate as well.  They thought that during the hospitalization the nurse practitioner in psychiatry mentioned home counseling therapy for her anxiety.    ROS: as per HPI    OBJECTIVE:   /64 (Patient Site: Left Arm, Patient Position: Sitting, Cuff Size: Adult Regular)  Pulse 68  Temp 98.7  F (37.1  C) (Oral)   Resp 16  Wt 156 lb 11.2 oz (71.1 kg)  BMI 25.29 kg/m2    General: Pleasant, well-appearing, in no acute distress.  HEENT: Sclera clear.  Oropharynx with moist mucous membranes.    Cardiovascular: Heart regular rate and rhythm, normal S1-S2, no murmurs, rubs, or  gallops  Respiratory: Lungs are clear to auscultation bilaterally without wheezes or crackles.  Good air movement throughout.  No increased work of breathing.  Extremities: Warm and well perfused, pedal and ankle edema  Skin: No jaundice or pallor.  Psychiatric: Presents on time and well groomed.  Normal speech and thought content.  Good eye contact.  Anxious.  Neuro: Alert and oriented.  Grossly nonfocal.      ASSESSMENT/PLAN  Medication list reviewed with patient and daughter today.  Hospital discharge summary, cardiology consult, GI consult, psychiatric consult, imaging and labs reviewed.  Also reviewed recent ER visit on 927 for abdominal pain, unremarkable CT scan, thought to be related to IBS, and hyoscyamine helped  1.  Depression and anxiety  She is worried about getting a medication refill when due, since it was prescribed by a hospitalist.  Refill for citalopram 20 mg sent to her pharmacy to hold on file for when needed.  She will continue with titrating up as recommended at hospital discharge, up to a goal dose of 20 mg.  She is tolerating it.  She might be having some mild urinary retention for a few hours in the morning after taking it.  Recommend she start taking it in the evening before bed, and let me know if continued problems.  Discussed signs or symptoms or specific reasons to be evaluated with regard to urinary retention.   - citalopram (CELEXA) 20 MG tablet; Take 1 tablet (20 mg total) by mouth daily.  Dispense: 90 tablet; Refill: 0  - Basic Metabolic Panel     2.  Severe orthostatic hypotension, supine hypertension  Orthostatic hypotension seems to be improved on the fludrocortisone.  She has concerns that it is causing abdominal bloating and distention, pedal edema.  I think it is more likely the amlodipine is causing her pedal edema and her chronic constipation and IBS are causing the abdominal symptoms.  Encouraged her to continue taking this medication as prescribed until she is reevaluated  by cardiology on Thursday.  Home blood pressures reviewed, no elevated readings, but these typically happen at night while supine.  I did not make any further adjustments to her medication regimen today and she will follow-up with scheduled with cardiology on Thursday.  She has been very concerned about the amlodipine for months now regarding multiple potential side effects, and I would defer to cardiology later this week about a potential dose reduction in amlodipine.    3.  Chronic constipation and IBS  She is continuing the regimen recommended by [place] [facility] with MiraLAX daily and at the recommendation has stopped the fiber supplement    4.  Insomnia  Also ongoing 1 of her biggest concerns and most problematic issues.  They report that melatonin has not helped, trazodone causes too much grogginess.  She recently refilled lorazepam and finds this to be helpful.  She can continue to use this at night, we did discuss again the potential risks and adverse effects, especially in the elderly.  Plan for this to be short-term medication, suspect sleep will improve as anxiety improved on Celexa.    Patient was advised to make a follow-up visit with me in a month, but schedule sooner if any problems or concerns come up.  They were advised to check with [facility] homecare regarding potential counseling/therapy for her anxiety, perhaps social work consult for this for home resources.  [facility] paperwork completed today, see scanned forms.

## 2021-06-13 NOTE — PROGRESS NOTES
"[facility]   Video Visit    Code Status: DNR/DNI    Mehreen Camara is a 80 y.o. female who is being evaluated via a billable video visit.      The patient has been notified of following:     \"This video visit will be conducted via a call between you and your physician/provider. We have found that certain health care needs can be provided without the need for an in-person physical exam.  This service lets us provide the care you need with a video conversation.  If a prescription is necessary we can send it to the facility team.  If lab work is needed we can place an order through the facility team to have that test done at a later time.    If during the course of the call the physician/provider feels a video visit is not appropriate, you will not be charged for this service.\"     Physician/Provider has received verbal consent for a Video Visit from the patient? Yes    Patient would like the video invitation sent by: Send to: [employer]    Video Start Time: 11.45PM    Chief Complaint/Reason for Visit:  Chief Complaint   Patient presents with     H & P       HPI:   Mehreen is a 80 y.o. female who is being examined in the Covid isolation unit because of testing positive for Covid infection.  She has absolutely no symptoms and denies any cough congestion shortness of breath  She has been afebrile since moving to the Covid unit 5 days ago  No hypoxia with respiratory failure reported either  Her major issue remains pain with her underlying history of chronic pain  She remains on topical pain gels as well as oxycodone and has been asking them quite often  She has an underlying history of anxiety disorder and reports her mood has been stable.  At baseline she has a movement disorder and significant gait instability and remains a falls risk.    Active Ambulatory Problems     Diagnosis Date Noted     Osteoarthritis 11/22/2006     Cataract 09/17/2018     Hypothyroidism 09/22/2009     Mixed " hyperlipidemia 02/10/2008     Chronic Major Depression 09/17/2018     Hypertension 11/22/2006     Aneurysm Of The Abdominal Aorta 02/25/2008     Harris's esophagus 01/11/2012     Osteopenia 09/17/2018     Diaphragmatic hernia 09/18/2018     Retinal Migraine Headache      Menopause Has Occurred      Dizziness      Obesity      Tinnitus Of Both Ears      Adrenal adenoma 06/04/2008     S/P AAA repair using bifurcation graft 11/30/2015     Weakness 05/04/2017     Anxiety state 02/26/2008     Bloating 05/05/2017     Orthostatic hypotension      Early satiety 12/16/2013     Dizziness 07/03/2017     Primary insomnia 07/04/2017     Orthostatic hypotension dysautonomic syndrome (H) 09/22/2017     Abdominal pain, epigastric 12/16/2013     Constipation 12/16/2013     Depression, major 07/13/2009     Adjustment disorder with mixed anxiety and depressed mood 09/17/2018     Insomnia due to anxiety and fear 09/17/2018     Partial small bowel obstruction (H)      Weak 04/29/2018     Major depressive disorder, single episode, moderate (H)      Generalized muscle weakness 07/04/2018     Weakness of both lower extremities 07/05/2018     Adjustment disorder with anxious mood 09/17/2018     Mood disorder due to a general medical condition      Insomnia due to mental condition      Bradycardia 09/17/2018     Lower back pain      Back pain 05/16/2020     Lives in assisted living facility 05/16/2020     DDD (degenerative disc disease), lumbosacral 02/26/2008     Metabolic encephalopathy      Cognitive and behavioral changes      Agitation      Sleep difficulties      Retinal migraine 04/28/2014     Nonrheumatic aortic valve stenosis 10/23/2020     Abnormal computed tomography scan 09/15/2016     Abnormal finding on imaging 09/15/2016     Anemia 11/18/2010     Benign neoplasm of ascending colon 09/19/2016     Bilateral knee pain 08/05/2020     Chest wall pain 01/20/2013     Compression fracture of lumbar vertebra (H) 10/10/2020     Contact  dermatitis and eczema 11/22/2006     Dietary counseling and surveillance 09/15/2016     Diverticulosis of colon 12/18/2014     Dysphagia 12/17/2013     Dyslipidemia 09/17/2018     Fall 08/05/2020     Flatulence, eructation and gas pain 02/19/2014     Gaseous abdominal distention 09/19/2016     Gastro-esophageal reflux disease with esophagitis 06/06/2017     Hemorrhage of rectum and anus 09/19/2016     Hoarseness of voice 04/06/2010     Hypokalemia 06/29/2007     Irritable bowel syndrome 11/22/2006     Malaise and fatigue 07/10/2007     Movement disorder 09/19/2018     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     Nontoxic multinodular goiter 11/22/2006     Polyp of colon 09/19/2016     Post-op pain 12/12/2018     Postoperative hypothyroidism 01/15/2013     Prediabetes 10/26/2010     REM sleep behavior disorder 08/05/2020     Rupture of left Achilles tendon, sequela 12/31/2019     S/P laparoscopic cholecystectomy 12/12/2018     Undiagnosed cardiac murmurs 11/22/2006     Urinary retention 11/02/2010     Vitamin D deficiency 01/15/2013     Chronic low back pain 09/29/2016     Resolved Ambulatory Problems     Diagnosis Date Noted     Esophageal reflux      Hypertensive urgency 09/25/2015     Uncontrolled hypertension 09/25/2015     Acute encephalopathy 09/26/2015     Frequent loose stools 05/05/2017     Past Medical History:   Diagnosis Date     AAA (abdominal aortic aneurysm) (H)      Harris esophagus      Depression      GERD (gastroesophageal reflux disease)      Hiatal hernia      HLD (hyperlipidemia)      HTN (hypertension)      Hypothyroid      Murmur      Scoliosis      Thyroid nodule      Social history includes currently residing in assisted living facility no current history of smoking or alcohol use    Family history includes a history of cancer in her brother heart disease in her father and mother as well as paternal uncle and aunt  Daughter has hypertension as well as lupus    ALLERGIES  Penicillins,  Aspirin, Atenolol, Atorvastatin, Bupropion, Cleocin [clindamycin hcl], Clindamycin, Codeine, Ibuprofen, Lovastatin, and Cephalexin    Medication List:  Current Outpatient Medications   Medication Sig     acetaminophen (TYLENOL) 500 MG tablet Take 1,000 mg by mouth 4 (four) times a day.      bisacodyl (DULCOLAX) 10 mg suppository Insert 10 mg into the rectum daily as needed.     calcium, as carbonate, (TUMS) 200 mg calcium (500 mg) chewable tablet Chew 1 tablet every 2 (two) hours as needed for heartburn.      carbidopa-levodopa (SINEMET CR)  mg ER tablet Take 1 tablet by mouth 3 (three) times a day.     cholecalciferol, vitamin D3, 1,000 unit tablet Take 2,000 Units by mouth daily.      clotrimazole (LOTRIMIN) 1 % cream Apply 1 application topically 2 (two) times a day as needed. perineal rash     diclofenac sodium (VOLTAREN) 1 % Gel Apply 4 g topically 2 (two) times a day.     DULoxetine (CYMBALTA) 60 MG capsule Take 60 mg by mouth daily.     gabapentin (NEURONTIN) 300 MG capsule Take 300 mg by mouth 2 (two) times a day. AND give 600 mg by mouth at bedtime     hydroCHLOROthiazide (HYDRODIURIL) 25 MG tablet Take 25 mg by mouth daily.     hydrOXYzine pamoate (VISTARIL) 25 MG capsule Take 25 mg by mouth 4 (four) times a day.     levothyroxine (SYNTHROID, LEVOTHROID) 100 MCG tablet Take 100 mcg by mouth daily.     levothyroxine (SYNTHROID, LEVOTHROID) 137 MCG tablet Take 1 tablet (137 mcg total) by mouth daily.     losartan (COZAAR) 50 MG tablet TAKE TABLET BY MOUTH EVERY MORNING     oxyCODONE (ROXICODONE) 5 MG immediate release tablet Take 5 mg by mouth every 4 (four) hours as needed for pain.     peg 400-propylene glycol (SYSTANE) 0.4-0.3 % Drop Administer 1 drop to both eyes 3 (three) times a day. AND instill 2 drops into both eyes every day PRN     polyethylene glycol (MIRALAX) 17 gram packet Take 17 g by mouth daily. AND PRN (max 3 doses/24 hours)     psyllium with dextrose (METAMUCIL JAR) powder Take 1 g by  mouth daily.     QUEtiapine (SEROQUEL) 25 MG tablet Take 25 mg by mouth at bedtime. AND give 25 mg by mouth daily PRN     senna (SENOKOT) 8.6 mg tablet Take 1 tablet by mouth 2 (two) times a day. AND give 1 tablet by mouth PRN       Constitutional: Negative.  Negative for fever, chills, activity change, appetite change and fatigue.   HENT: Negative for congestion and facial swelling.    Eyes: Negative for photophobia, redness and visual disturbance.   Respiratory: Negative for cough and chest tightness.    Cardiovascular: Negative for chest pain, palpitations and leg swelling.   Gastrointestinal: Negative for nausea, diarrhea, constipation, blood in stool and abdominal distention.   Genitourinary: Negative.    Musculoskeletal: Negative.  HAS H/O FALLS AND GAIT AND BALANCE ISSUES  ALSO HAS CHRONIC PAIN  Skin: Negative.    Neurological: Negative for dizziness, tremors, syncope, weakness, light-headedness and headaches.   Hematological: Does not bruise/bleed easily.   Psychiatric/Behavioral: Negative.      PHYSICAL EXAM  T 98 /78 P78  GENERAL: no acute distress. Cooperative in conversation.   HEENT: pupils are equal, round and reactive. Oral mucosa is moist and intact.  RESP:Chest symmetric. Regular respiratory rate. No stridor.  ABD: Nondistended, soft.  EXTREMITIES: No lower extremity edema.   NEURO: non focal. Alert and oriented x3.   PSYCH: within normal limits. No depression or anxiety.  SKIN: warm dry intact       Labs:  Recent Results (from the past 240 hour(s))   COVID-19 Virus PCR MRF    Specimen: Respiratory   Result Value Ref Range    COVID-19 VIRUS SPECIMEN SOURCE [facility]     2019-nCOV Detected, Abnormal Result (!!)          Assessment/Plan:  Acute Covid infection  Underlying history of Lewy body dementia with behavioral disturbances  Multiple system atrophy  Depression  History of chronic pain  History of falls    Patient has been moved from her assisted living facility and has been moved to the [facility]  isolation unit after she tested positive.  Today she is afebrile.  She is not hypoxic.  She denies any localizing symptoms for Covid infection with no cough congestion or any other localizing symptoms reported.  She will be monitored and is made aware of the fact.  In the meantime she will be treated symptomatically only and no labs are needed as she is completely asymptomatic.  Oral intake is being monitored and weights have been stable intake has been stable so far to as per staff.  She has underlying history of Lewy body dementia as well as multiple system atrophy and is a high fall risk.  She will be monitored closely.    She also has anxiety disorder and remains on psychotropic meds which will be continued  She remains anxious about her chronic pain issues and narcotics and I did update her that we will not be adjusting those meds as she is short-term stay here and as soon as she is done with her isolation she will be discharging back to the [facility] and can discuss them with her primary physician there  We will not be tapering her off any narcs.  Her goal is to go back to her [facility].  If she worsens she may go to the hospital    Video-Visit Details    Type of service:  Video Visit    Video End Time (time video stopped): 12.20PM    Originating Location (pt. Location):[facility] [[id number]]    Distant Location (provider location):  [facility]     Mode of Communication:  Zoom Video Conference        ANNIKA Oconnor

## 2021-06-13 NOTE — PROGRESS NOTES
Assessment:   Mehreen Camara is a 77 y.o. y.o. female with past medical history significant for attention, hyperlipidemia, hypothyroidism, GERD, depression who presents today for follow-up regarding chronic left-sided low back pain without significant sciatica type symptoms.  This is likely left sacroiliac joint dysfunction.  Patient has a significant amount of bilateral proximal leg weakness in addition to significant deconditioning after a hospitalization.  The patient also has depression which likely contributes to her overall pain picture.  She did have previous relief with a left SI joint injection performed in April 2017.  She is neurologically intact without any red flag symptoms.       Plan:     A shared decision making plan was used.  The patient's values and choices were respected.  The following represents what was discussed and decided upon by the physician and the patient.      1.  DIAGNOSTIC TESTS: [facility] of the lumbar spine from [facility] 2017 [facility] was personally reviewed today.  No further diagnostic testing necessary at this time.  2.  PHYSICAL THERAPY: The Patient did not go to physical therapy as ordered at her last visit in June 2017, as shortly after that visit she was hospitalized.  The size it is very important that she go to outpatient physical therapy at this time.  An order was provided for her to go to [facility] rehab.  Her daughter is says that they can get her transportation to and from physical therapy appointments.  3.  MEDICATIONS: No changes to her medications at this time.  4.  INTERVENTIONS: Patient is very interested in having a repeat injection.  Given that her left-sided pain did resolve previously with a left sacroiliac joint injection, will start with a left sacroiliac joint injection.  If this does not provide relief, then would recommend a left L4-5 and L5-S1 facet joint injection.  However would not start with the facet joint injections that  she says that the left-sided pain is slightly different than the pain that she previously had on the right side which is thought to be facet mediated.  5.  PATIENT EDUCATION:    -The patient's questions were answered to her satisfaction today.  She was in agreement with the treatment plan.  6.  FOLLOW-UP: Patient will follow-up first available for the SI joint injection.  If she has any questions or concerns before that time she is encouraged to call the clinic.    Subjective:     Mehreen Camara is a 77 y.o. female who presents today for follow-up regarding left low back pain.  The patient was last seen in the clinic in June 2017.  At that time she was status post right L4-5 and L5-S1 facet joint injections.  She reports that very shortly after the injection she was hospitalized, so she is not sure if these helped her back pain, though she states that the ones that were performed in April did significantly help with the back pain.  The pain currently is looking the middle of her back and then off to the left side going into the left buttock.  This occurred approximately 2 weeks ago after she was moving in bed.  This does feel like it is a different pain than what was on the right side.  This is very severe, and she rates it at a 6 out of 10.  At best it is a 4 out of 10.  At worst it is a 10 out of 10.  Any moving or bending will aggravate the back pain.  Sitting or resting seems to help relieve the pain.    Not able to do physical therapy as ordered at her last visit, and she was hospitalized shortly after that.  She did spend quite a few weeks in a transitional care unit afterwards, but she says that the physical therapy was mainly general and not specifically targeting her back.    Past medical history is reviewed and is unchanged for any new medical diagnoses in the interim.      Family history is reviewed and is unchanged in the interim.        Review of Systems:  Positive for numbness and tingling in the  bilateral feet, generalized sensation of weakness in the legs, headache, dizziness, blurred vision.  Negative for any bowel or bladder incontinence, footdrop, nausea/vomiting, balance changes.     Objective:   CONSTITUTIONAL:  Vital signs as above.  No acute distress.  The patient is well nourished and well groomed.    PSYCHIATRIC:  The patient is awake, alert, oriented to person, place and time.  The patient is answering questions appropriately with clear speech.  Normal affect.  SKIN:  Skin over the face, posterior torso, bilateral upper and lower extremities is clean, dry, intact without rashes.  MUSCULOSKELETAL:  Gait is non-antalgic.  The patient is able to heel and toe walk without any difficulty.  Lifting tenderness over the left lower lumbar paraspinal muscles.      The patient has 5/5 strength for the bilateral hip flexors, knee flexors/extensors, ankle dorsiflexors/plantar flexors, ankle evertors/invertors.    NEUROLOGICAL: 2/4 patellar, medial hamstring, achilles reflexes which are symmetric bilaterally.  No ankle clonus bilaterally.  Sensation to light touch is intact in the bilateral L4, L5, and S1 dermatomes.       RESULTS: The patient's MRI of the lumbar spine is personally reviewed today.  The patient does have significant facet degeneration at the L4-5 and L5-S1 levels.  Please refer the report for details.

## 2021-06-13 NOTE — PROGRESS NOTES
Updates:  9/20/17- Check in with Alysa over e-mail, pt has [facility] coming into home at this time. Will continue to monitor pt's chart.   7/31/17- Spoke with Pt's daughter Alysa. She reported that her mother is still at the [facility]. They are looking into [facility], told her about Jorge from [facility]-will send info over email. Reported that Mehreen is having anxiety and believes that the Norvasc that was prescribed while in the hospital in may 2017 has really impacted her mother negatively and they discontinued her anxiety medication. Alysa would like PCP or MTM to be involved in Mehreen's care. Her health has been greatly impacted these past few months.  Will send message to PCP and MTM.   7/11/17- Pt is in the TCU, spoke with her PCP who spoke to pt's daughter, looking into additional HC services or LTC. Will call daughter to see if resources are needed.   6/5/17- Met with Pt and daughter Alysa in the clinic today. Pt only had 3 hours of sleep and hasn't been feeling well due to medication changes, sleepless nights, and pain. Pt's daughter said that she has been on the decline the past 6 months. Pt had another medication adjustment today and would like to know when to come in for a f/u. Pt also received a referral from pcp for stockings and wheelchair. Pt's daughter would like to know how insurance works for that. She would like a call for update with those referrals.   5/31/17- Pt agreed to meet me after apt on 6/5 with pcp.   5/1/17-Patient called me today because she is feeling very dizzy and nauseous after taking her gabapentin. We spoke about this same issue on Thursday, April 27th (see note). She has been taking a different dosage of the pill every night depending on how she feels. She is confused as to why she is even taking this medication so I advised her to call the  pain/spine clinic to clarify what she needs to do. She said the clinic did not return her phone call last Friday (there  is a note that they spoke) so I called the clinic and spoke to RN Veronica [phone]. She said she would call the patient and have her come into their clinic since she is not well. The patient agreed to see the [facility], so I scheduled her an appointment for May 2nd.        Patient FYI:    [facility]  Contact: Latasha [phone] [email or web address]

## 2021-06-13 NOTE — PROGRESS NOTES
SUBJECTIVE:   Chief Complaint   Patient presents with     Follow-up     F/U from cardiology; medication changes for bp and depression; c/o hypotension recently; pt reports headaches since taking celexa     Mehreen Morrisonhorace 77 y.o. female    Current Outpatient Prescriptions   Medication Sig Dispense Refill     acetaminophen (TYLENOL) 500 MG tablet Take 500 mg by mouth every 6 (six) hours as needed for pain. Back pain       amLODIPine (NORVASC) 2.5 MG tablet Take 1 tablet (2.5 mg total) by mouth 2 (two) times a day. 60 tablet 12     cholecalciferol, vitamin D3, 1,000 unit tablet Take 1,000 Units by mouth daily.       citalopram (CELEXA) 20 MG tablet Take 1 tablet (20 mg total) by mouth daily. 90 tablet 0     docusate sodium (COLACE) 100 MG capsule Take 1 capsule (100 mg total) by mouth 2 (two) times a day as needed for constipation. 30 capsule 0     levothyroxine (SYNTHROID, LEVOTHROID) 137 MCG tablet Take 137 mcg by mouth daily.       LORazepam (ATIVAN) 0.5 MG tablet TAKE ONE-HALF TO ONE TABLET BY MOUTH EVERY 8 HOURS AS NEEDED FOR ANXIETY OR  INSOMNIA 30 tablet 0     losartan (COZAAR) 50 MG tablet Take 50 mg by mouth 2 (two) times a day.       melatonin 3 mg Tab tablet Take 1 tablet (3 mg total) by mouth daily with supper. 30 tablet 0     metoprolol tartrate (LOPRESSOR) 25 MG tablet Take 1 tablet (25 mg total) by mouth 2 (two) times a day. 60 tablet 2     multivitamin with minerals (THERA-M) 9 mg iron-400 mcg Tab tablet Take 1 tablet by mouth daily.       omeprazole (PRILOSEC) 20 MG capsule Take 20 mg by mouth 2 (two) times a day before meals.       polyethylene glycol (GLYCOLAX) 17 gram/dose powder Take 17 g by mouth daily. Mix with water 255 g 0     PROPYLENE GLYCOL//PF (SYSTANE, PF, OPHT) Administer 1 drop to both eyes 4 (four) times a day as needed.        No current facility-administered medications for this visit.      Allergies: Penicillins; Atenolol; Atorvastatin; Codeine; Ibuprofen; Lovastatin; and  Cephalexin   No LMP recorded. Patient has had a hysterectomy.    HPI:   Pleasant 77-year-old here for follow-up for labile hypertension, orthostatic hypotension.  Recent medication changes through cardiology.    Hypertension and orthostatic hypotension: Patient saw cardiology about a month ago and has a follow-up appointment scheduled in about a week.  Patient's daughter states they focus more on quality of life during that cardiology visit and making Mehreen more comfortable.  Her fludrocortisone was discontinued and her amlodipine dose was decreased.  She still reports pedal edema and frustrated with that.  Reports blurry vision that temporarily occurs every time she takes amlodipine.  She really wants to be off of the amlodipine.    Anxiety: Feels like she is having more headaches since the Celexa was started.  Does not feel like the 20 mg dose is any more helpful than the 10 mg and likely causing more side effects.  She wonders about going back down to 10 mg dose.    ROS: as per HPI.  In addition, recent increase in left low back pain and left hip pain related to a minor strain getting in and out of bed multiple times one night.  She has a spine clinic appointment scheduled for Monday.  Reports ongoing lower abdominal pain and bloating every time she eats.  Has had several CT abdomen and pelvis without cause of symptoms.  Has a GI appointment scheduled for next week.    OBJECTIVE:   BP (!) 72/58 (Patient Site: Right Arm, Patient Position: Standing, Cuff Size: Adult Regular)  Pulse 64  Temp 97.5  F (36.4  C) (Oral)   Resp 16  Wt 148 lb 12.8 oz (67.5 kg)  BMI 24.02 kg/m2    General: Pleasant, well-appearing, in no acute distress.  HEENT: Sclera clear.  Oropharynx with moist mucous membranes.  Cardiovascular: Heart regular rate and rhythm, normal S1-S2, no murmurs, rubs, or gallops  Respiratory: Lungs are clear to auscultation bilaterally without wheezes or crackles.  Good air movement throughout.  No increased  work of breathing.  Extremities: Warm and well perfused, slight nonpitting ankle edema   Skin: No jaundice or pallor.  Cancer: Presents on time and well groomed.  Normal speech and thought content.  Gets somewhat frustrated easily, especially with her daughter      ASSESSMENT/PLAN  1. Essential hypertension   Metoprolol had been prescribed by the hospitalist, so refill was sent today to eliminate any problems on refill when she is next due  - metoprolol tartrate (LOPRESSOR) 25 MG tablet; Take 1 tablet (25 mg total) by mouth 2 (two) times a day.  Dispense: 60 tablet; Refill: 2    2.  Anxiety  Patient and daughter prefer to decrease her Celexa to 10 mg daily.  This would be fine as she has not seen better control of symptoms on 20 mg daily for the last 3 weeks.  They should watch closely and if they notice an increase in anxiety with this dose decrease would recommend going back up to 20 mg and even potentially higher doses for improved control.    3.  Orthostatic hypotension  She has follow-up with cardiology in about a week, no further changes to her antihypertensive regimen today.  Orthostatic blood pressure dropped to 70 today, which is not unusual for her.    4.  Significant lower abdominal pain and bloating after eating  Ongoing now for quite some time and she has an appointment scheduled [facility] next week    5.  Left low back and hip pain  Has an appointment with spine clinic next week    6.  Blurry vision  Correlates with every time she takes the amlodipine.  Recommend she schedule a full eye exam as she was told last month.    7.  Insomnia  Multiple medications either have not helped or caused side effects.  She thinks the lorazepam might help but she does not like the idea of taking it.  Her daughter thinks she should keep some on hand for difficult lites.  Refill sent, precautions discussed as well as adverse effects.  If this does help with her sleep, the benefits outweigh the risks.  When her  insomnia causes sleepless nights for several nights in a row, her anxiety completely spikes and her functioning declines.  We did discuss risk of this medication worsening hypotension, and she should take any position changes at night very slowly.     Recommend he schedule follow-up visit in 1 month with me but sooner if problems or concerns.    25 minutes spent, greater than 50% in discussion and counseling regarding the above.

## 2021-06-14 NOTE — PROGRESS NOTES
"[facility] Daily Progress     Patient Name: Mehreen Camara  Date: 11/15/2017  Visit #: 2/4-6  PTA visit #:  -  Referral Diagnosis: chronic low back pain, SIJ pain on L side  Referring provider: Dr. Sara Crenshaw  Visit Diagnosis:     ICD-10-CM    1. Chronic bilateral low back pain with bilateral sciatica M54.42     M54.41     G89.29    2. Generalized muscle weakness M62.81    3. Unsteadiness on feet R26.81    4. Decreased ROM of lumbar spine M25.60    5. Chronic left shoulder pain M25.512     G89.29    6. Decreased ROM of left shoulder M25.612    7. Sacral pain M53.3    8. Acute pain of left hip M25.552    9. Chronic bilateral low back pain with right-sided sciatica M54.41     G89.29          Assessment:     HEP/POC compliance is  good .  Patient demonstrates understanding/independence with home program.  Patient is benefitting from skilled physical therapy and is making steady progress toward functional goals.  Patient is appropriate to continue with skilled physical therapy intervention, as indicated by initial plan of care.    Goal Status:  Pt. will demonstrate/verbalize independence in self-management of condition in : 6 weeks  Pt. will be independent with home exercise program in : 6 weeks  Pt. will have improved quality of sleep: with less pain;waking less times/night;in 6 weeks  Patient will stand : 10 minutes;in 6 weeks;with less difficultty;with less pain;for home chores  Pt will: no longer have radicular sympotoms into bilateral LE's within 8 weeks in order to imporove QOL.    Plan / Patient Education:     Continue with initial plan of care.  Progress with home program as tolerated.     Exercises:  Exercise #1: supine glut/piriformis stretches  Comment #1: 30\" holds, 2-3 reps each side  Exercise #2: abd set  Comment #2: x10 with 5\" holds  Exercise #3: glut set  Comment #3: x10 with 5\" holds  Exercise #4: SIJ muscle energy- anter correction  Comment #4: x10 with 5\" holds  Exercise " "#5: seated LE strengthening- marching, LAQ, heel/toe raises, hip abd  Comment #5: x10-15 B with 5\" holds     Plan for next visit: [employer], progress abd set to march, debbie, manual muscle energy for SIJ    Subjective:     Pain Ratin-6/10    eeling better than last session. Having low blood pressure lately, which is causing her to feel tired and weak. Was dizzy this morning.  Gets pain into her glut. Feeling a little better in the back since last session. Very fatigued/tired today      Objective:     BP while sitting today: 92/64  HR: 69 bpm    Difficulty with transfers and bed mobility  Gait: use of SEC, shuffled, slow gait    Treatment Today     TREATMENT MINUTES COMMENTS   Evaluation     Self-care/ Home management     Manual therapy     Neuromuscular Re-education     Therapeutic Activity     Therapeutic Exercises 26 Discussed progress. Took BP per patient request. Progressed HEP today- see flow sheet. Encouraged patient to continue monitoring her BP and f/u with her MD   Gait training     Modality__________________                Total 26    Blank areas are intentional and mean the treatment did not include these items.       Kash Ortiz, PT, DPT  11/15/2017    "

## 2021-06-14 NOTE — PROGRESS NOTES
[facility] Daily Progress/Discharge summary     Patient Name: Mehreen Camara  Date: 11/27/2017  Visit #: 4/4-6  PTA visit #:  -  Referral Diagnosis: chronic low back pain, SIJ pain on L side  Referring provider: Dr. Sara Crenshaw  Visit Diagnosis:     ICD-10-CM    1. Chronic bilateral low back pain with bilateral sciatica M54.42     M54.41     G89.29    2. Generalized muscle weakness M62.81    3. Unsteadiness on feet R26.81    4. Decreased ROM of lumbar spine M25.60    5. Chronic left shoulder pain M25.512     G89.29    6. Decreased ROM of left shoulder M25.612    7. Acute pain of left hip M25.552    8. Sacral pain M53.3    9. Chronic bilateral low back pain with right-sided sciatica M54.41     G89.29          Assessment:   Patient feels that she's made limited progress while in physical therapy. This is her 2nd bout of PT within the past year with little success. She is being discharged from PT today due to patient not feeling PT is of much benefit. Her goals have been partially met in PT. She is being referred back to the spine center for a follow up on 12/19/17. Patient is now discharged from PT and will need a new order to resume in the future.    HEP/POC compliance is  good .  Patient demonstrates understanding/independence with home program.    Goal Status:  Pt. will demonstrate/verbalize independence in self-management of condition in : 6 weeks;Met  Pt. will be independent with home exercise program in : 6 weeks;Met  Pt. will have improved quality of sleep: with less pain;waking less times/night;in 6 weeks;Met  Patient will stand : 10 minutes;in 6 weeks;with less difficultty;with less pain;for home chores;Not Met (only able to stand for 5 minutes)  Pt will: no longer have radicular sympotoms into bilateral LE's within 8 weeks in order to imporove QOL. (GOAL NOT MET)    Plan / Patient Education:     Exercises:  Exercise #1: supine glut/piriformis stretches (HOLD-increase in pain per  "patient)  Comment #1: 30\" holds, 2-3 reps each side  Exercise #2: abd set+single leg march  Comment #2: x10 with 5\" holds  Exercise #3: glut set  Comment #3: x10 with 5\" holds  Exercise #4: SIJ muscle energy- anter correction  Comment #4: x10 with 5\" holds  Exercise #5: seated LE strengthening- marching, LAQ, heel/toe raises, hip abd  Comment #5: x10-15 B with 5\" holds         Subjective:     Pain Ratin+/10    Feeling a little better. Feels that the glut and piriformis stretches make her more painful. Doesn't feel that she has made \"whole lot of progress\" in physical therapy. Her BP is fluctuating, which \"takes all of my energy away\". Gets pins and needles into their feet. She isn't sure when this started. Feels she has some small benefit from her injections.      Objective:     BP while sitting today: 176/106,  175/105   In standin/86   HR: 77 bpm    Difficulty with transfers and bed mobility  Gait: use of SEC, shuffled, slow gait    Treatment Today     TREATMENT MINUTES COMMENTS   Evaluation     Self-care/ Home management     Manual therapy     Neuromuscular Re-education     Therapeutic Activity     Therapeutic Exercises 19 Discussed progress, HEP, and discharge plans. Reviewed her HEP. Answered patient questions.   Gait training     Modality__________________                Total 19    Blank areas are intentional and mean the treatment did not include these items.       Kash Ortiz, PT, DPT  2017    "

## 2021-06-14 NOTE — PROGRESS NOTES
Updates:  Attempt 1: Care Guide called patient.  If this patient is returning my call, please transfer to Veronica at ext [phone]  .  Will discuss during Care Conferences if appropriate to continue in Clinic Care Coordination    Patient FYI:    Contact: Latasha [phone] [email or web address]

## 2021-06-14 NOTE — PROGRESS NOTES
[facility]   [facility]-Pelvic Initial Evaluation    Patient Name: Mehreen Camara  Date of evaluation: 11/9/2017  Referral Diagnosis: Chronic low back pain  Referring provider: Maurilio Chapin*  Visit Diagnosis:     ICD-10-CM    1. Chronic bilateral low back pain with bilateral sciatica M54.42     M54.41     G89.29    2. Generalized muscle weakness M62.81    3. Unsteadiness on feet R26.81    4. Decreased ROM of lumbar spine M25.60        Assessment:      Mehreen Camara is a 77 y.o. female who presents to therapy today with chief complaints of chronic low back pain and weakness. Patient has a PMH that is positive for chronic major depression, HTN, osteopenia, hx of AAA repair, IBS, anxiety, insomnia due to anxiety and fear. Difficulty with transfers, sleeping, bending, kneeling/squatting, dressing, showering, meal preparation, lifting, stairs, yard/house work  due to pain and weakness.  Pain symptoms are not improving and are constant.  Patient demonstrates signs and sx consistent with decreased lumbar ROM, weakness, and myofascial pain. PT POC and goals have been discussed with patient and She  is agreeable to these. Patient is appropriate for skilled therapy services.    Goals:  Pt. will demonstrate/verbalize independence in self-management of condition in : 6 weeks  Pt. will be independent with home exercise program in : 6 weeks  Pt. will have improved quality of sleep: with less pain;waking less times/night;in 6 weeks  Patient will stand : 10 minutes;in 6 weeks;with less difficultty;with less pain;for home chores  Pt will: no longer have radicular sympotoms into bilateral LE's within 8 weeks in order to imporove QOL.    Patient's prognosis is fair.    Barriers to Learning or Achieving Goals:  Chronicity of the problem.  Co-morbidities or other medical factors.  see PMH  Age.        Plan / Patient Instructions:        Plan of Care:   Patient Related Instruction: Nature of  "Condition;Treatment plan and rationale;Self Care instruction;Basis of treatment;Body mechanics;Posture;Precautions;Next steps;Expected outcome  Times per Week: 1-2  Number of Weeks: 6-8  Number of Visits: 12  Therapeutic Exercise: Strengthening;Stretching;ROM  Neuromuscular Reeducation: balance/proprioception;posture;core  Manual Therapy: myofascial release;soft tissue mobilization;strain counterstrain;muscle energy  Equipment: theraband     Exercises:  Exercise #1: supine glut/piriformis stretches  Comment #1: 30\" holds, 2-3 reps each side  Exercise #2: abd set  Comment #2: x10 with 5\" holds  Exercise #3: glut set  Comment #3: x10 with 5\" holds    Plan for next visit: Nu'Step, seated LE strengthening exercises     Subjective:         Social information:   Lives in apartment by herself. Uses elevator   Kids help her    History of Present Illness:    Mehreen is a 77 y.o. female who presents to therapy today with complaints leg/arm weakness and chronic low back. Pain radiates into left gluts and posterior R leg just above her knee. Symptoms are constant and not improving. She reports  a constant  history of similar symptoms. She describes their previous level of function as limited with most activities due to chronic pain. Patient had recent injection; isn't sure if it helped yet. Isn't doing the previous HEP- too much pain. Bending and stretching make her pain worse. Not much improves her symptoms.     Pain Ratin  Pain rating at best: 5-6  Pain rating at worst: 8-9  Pain description: sharp, achy    Functional limitations are described as occurring with: transfers, sleeping, bending, kneeling/squatting, dressing, showering, meal preparation, lifting, stairs, yard/house work         Objective:      Note: Items left blank indicates the item was not performed or not indicated at the time of the evaluation.    Patient Outcome Measures :    Modified Oswestry Low Back Pain Disablity Questionnaire  in %: 88   Scores range " from 0-100%, where a score of 0% represents minimal pain and maximal function. The minimal clinically important difference is a score reduction of 12%.    Examination  1. Chronic bilateral low back pain with bilateral sciatica     2. Generalized muscle weakness     3. Unsteadiness on feet     4. Decreased ROM of lumbar spine       Involved side: Bilateral  Posture Observation:      General sitting posture is  fair.    Gait: slow, use of SEC, decreased step length    Lumbar ROM:    Date:      *Indicate scale AROM AROM AROM   Lumbar Flexion Severely limited AROM due to increase L sacral pain     Lumbar Extension Severely limited AROM due to increase in pain      Right Left Right Left Right Left   Lumbar Sidebending Severely limited due to pain Severely limited due to pain       Lumbar Rotation         Thoracic Flexion      Thoracic Extension      Thoracic Sidebending         Thoracic Rotation           Lower Extremity Strength:     Date:      LE strength/5 Right Left Right Left Right Left   Hip Flexion (L1-3) 3- 3-       Hip Extension (L5-S1)         Hip Abduction (L4-5) 4- 4-       Hip Adduction (L2-3) 4- 4-       Hip External Rotation         Hip Internal Rotation         Knee Extension (L3-4) 4- 4-       Knee Flexion 4- 4-       Ankle Dorsiflexion (L4-5) 4 4       Great Toe Extension (L5)         Ankle Plantar flexion (S1) 4 4       Abdominals        Sensation    Burning into B great toes (new since taking medication)    Lumbar Special Tests:     Lumbar Special Tests Right Left SI Tests Right  Left   Quadrant test   SI Compression     Straight leg raise 80 degrees, limited by HS tightness 80 degrees, limited by HS tightness SI Distraction     Crossover response neg neg POSH Test     Slump neg neg Sacral Thrust neg neg   Sit-up test  FADIR     Trunk extensor endurance test  Resisted Abduction     Prone instability test  Other:     Pubic shotgun  Other:       B hip PROM: mild-mod limited, pain-free    Tight gluts and  piriformis B      Treatment Today     TREATMENT MINUTES COMMENTS   Evaluation 20    Self-care/ Home management     Manual therapy     Neuromuscular Re-education     Therapeutic Activity     Therapeutic Exercises 15 Discussed PT POC and pathology of condition. Answered patient questions. Began HEP-see flowsheet. Recommend patient ice as needed. Recommend patient start using nu'step/sci-fit at her apartments to help with leg strength   Gait training     Modality__________________                Total 40    Blank areas are intentional and mean the treatment did not include these items.     PT Evaluation Code: (Please list factors)  Patient History/Comorbidities: chronic major depression, HTN, osteopenia, hx of AAA repair, IBS, anxiety, insomnia due to anxiety and fear   Examination: chronic sacral/low back pain  Clinical Presentation: stable  Clinical Decision Making: low    Patient History/  Comorbidities Examination  (body structures and functions, activity limitations, and/or participation restrictions) Clinical Presentation Clinical Decision Making (Complexity)   No documented Comorbidities or personal factors 1-2 Elements Stable and/or uncomplicated Low   1-2 documented comorbidities or personal factor 3 Elements Evolving clinical presentation with changing characteristics Moderate   3-4 documented comorbidities or personal factors 4 or more Unstable and unpredictable High              Kash Ortiz, PT, DPT  11/9/2017  1:50 PM

## 2021-06-14 NOTE — PROGRESS NOTES
Assessment:   Mehreen Camara is a 77 y.o. y.o. female with past medical history significant for hypertension, hyperlipidemia, hypothyroidism, GERD, depression who presents today for follow-up regarding chronic left-sided low back/upper buttock pain.  Pain is likely due to sacroiliac joint dysfunction.  The patient status post a left sacroiliac joint injection on November 6, 2017 which provided 60% relief of her pain for several weeks.  The patient feels that the relief that she received from that injection is wearing off.  -The patient also complains of pain in the lower buttocks bilaterally.  She is tenderness palpation over the ischial bursa/insertion of the hamstrings tendon.  She may have ischial bursitis.       Plan:     A shared decision making plan was used.  The patient's values and choices were respected.  The following represents what was discussed and decided upon by the physician assistant and the patient.      1.  DIAGNOSTIC TESTS: I reviewed the MRI lumbar spine.  No further diagnostic tests were ordered.      2.  PHYSICAL THERAPY: No further physical therapy was ordered.  Patient recently participated in 4 physical therapy sessions.  She did not feel was beneficial.    3.  MEDICATIONS: No changes are made to the patient's medications.  She can continue using Tylenol as needed.  -I did provide a prescription for an SI belt.    4.  INTERVENTIONS: No further interventions were ordered.  The patient has had 4 injections since April 2017.  I recommended that we hold off on any injections for now.  If the upper buttock pain were to worsen, we could consider repeating a left sacroiliac joint injection.  If the lower buttocks pain fails to improve, we could consider she will bursa injections under ultrasound guidance.    5.  PATIENT EDUCATION: The patient is in agreement with the above plan.  All questions were answered.    6.  FOLLOW-UP: The patient follow-up with me as needed.  She will call our clinic  if she is not improving.    Subjective:     Mehreen Camara is a 77 y.o. female who presents today for follow-up regarding chronic left-sided low back/upper buttock pain.  I last saw the patient on November 20, 2017.  At that time she is following up after a left sacroiliac joint injection which was performed on November 6, 2017.  At that time the patient reported that that injection provided 60% relief of her pain.  The patient states that shortly after she was seen for that visit she had an episode of diarrhea in which she was having to repeatedly get in and out of bed to use the bathroom.  The patient states that after that she felt her back pain flared back up again.  She states that she had increased pain in the same area as her injection, localizing to the upper buttock.  She also began to feel pain in the lower buttocks bilaterally.  She states that the muscle at the lower buttock feels very tight.  She denies any pain radiating further down the leg.  She rates her pain today as a 6 out of 10.  At its best it is a 2 out of 10.  At its worst it is an 8 out of 10.  The patient's pain is aggravated with sitting.  It is alleviated with repositioning.  She has numbness and tingling in her toes which is unchanged.  She feels generally weak.    The patient recently participated 4 sessions of physical therapy.  She did not feel it was very beneficial.  She is using Tylenol every 4 hours.    Past medical history is reviewed and is unchanged in the interim.    Family history is reviewed and is unchanged in the interim.      Review of Systems:  Positive for numbness/tingling, weakness, dizziness, blurry vision.  Negative loss of bowel/bladder control, footdrop, headache and nausea/vomiting, balance changes.     Objective:   CONSTITUTIONAL:  Vital signs as above.  No acute distress.  The patient is well nourished and well groomed.    PSYCHIATRIC:  The patient is awake, alert, oriented to person, place and time.  The  patient is answering questions appropriately with clear speech.  Normal affect.  HEENT: Normocephalic, atraumatic.  Sclera clear.    SKIN:  Skin over the face, posterior torso, bilateral upper and lower extremities is clean, dry, intact without rashes.  MUSCULOSKELETAL:  Gait is guarded.  She relates with a slightly flexed forward posture at the waist.  The patient tenderness palpation of the left sacral iliac joint.  She has no tenderness palpation over the ischial tuberosities/hamstring tendon insertions bilaterally. The patient has 5/5 strength for the bilateral hip flexors, knee flexors/extensors, ankle dorsiflexors/plantar flexors, ankle evertors/invertors.    NEUROLOGICAL:   Sensation to light touch is intact in the bilateral L4, L5, and S1 dermatomes.       RESULTS:  I reviewed the MRI lumbar spine from March 7, 2017.  This shows moderate multilevel degenerative disc and facet disease superimposed on a 35  lumbar levoscoliosis.  The patient has moderate left lateral recess stenosis at L4-5 as well as moderate foraminal stenosis on the left at L4-5 with less significant lateral recess and foraminal stenosis elsewhere.  There are left lateral osteophytes at L3-4, L4-5, and L5-S1 which may contact the nerve roots after they have exited the neural foramina.  The patient has facet arthropathy which is mild from T12-L1 through L2-3, moderate at L3-4 and L4-5, and mild to moderate at L5-S1.  Please see report for further details.

## 2021-06-14 NOTE — PROGRESS NOTES
ASSESSMENT:  1. Rash  KOH of skin scraping form lateral foot at last visit positive for fungal elements. Did not improve with trial of lamisil. She is most concerned about the intense burning pain, but I do not think this is related to tinea pedis. This rash is very distressing to her. Referral to dermatology for further evaluation and treatment.   - Ambulatory referral to Dermatology    2. Foot pain, pruritis  As above, this is very distressing to her. Unclear cause. Labs as below. Trial of triamcinolone to see if this gives any relief.  - Comprehensive Metabolic Panel  - HM2(CBC w/o Differential)  - Magnesium  - Vitamin B12  -Thyroid cascade    3. HTN  BP high today but daughter thinks it is related to distress and anxiety from foot symptoms, as well as poor sleep. She will be seeing cardiology tomorrow., so no changes advised today.    4. Anxiety  Continue citalopram. She is not certain it is helping, but I think she would feel worse without it. Has tried and failed multiple medications. Ativan as needed for sleep, which she can take every night if needed. We have discussed risks and adverse effects several times, and in her case at this point, it appears that the benefits outweigh risks. When she does not sleep, she completely decompensates, and other sleep medications have not been helpful.    PROCEDURE NOTE:   Callus over plantar aspect of 5th MTP was pared down with 10 blade scalpel, good results, no complications.     PLAN:  There are no Patient Instructions on file for this visit.    No orders of the defined types were placed in this encounter.    There are no discontinued medications.    No Follow-up on file.    CHIEF COMPLAINT:  Chief Complaint   Patient presents with     Follow-up     c/o rash on feet still that comes and goes; now on hands--using terbinafine 2 times daily       HISTORY OF PRESENT ILLNESS:  Mehreen is a 77 y.o. female presenting to the clinic today for rash. She is accompanied by her  daughter. She notes that she experiences a rash that comes and goes on both feet and now hands. She notes that her skin is very dry. She has tried Lamisil cream with little relief. She notices that the redness and swelling comes and goes. She notes that she has previously tried an oral antifungal but experienced negative side effects. She notes that she has had fungal toenails for many years. She experiences pain at night in feet and this causes problems sleeping. The burning pain began when she reduced her dose of Norvasc. She notes the more she urinates the less she experiences pain. She usually does not experience this pain in the mornings. The pain begins around 2-3 pm each day and gradually worsens throughout the evening. She does not think that taking her shoes off helps.    Hypertension: Her daughter notes that her mother's blood pressure has been increased in the past couple of days. Her daughter also explains that the rash has been causing her anxiety, and this might be contributing to elevated blood pressure. She follows cardiology, and has an appointment there tomorrow.    Anxiety: She is currently on 20 mg citalopram. She notes that this medication increases her appetite. She does not notice much change in her anxiety.     Insomnia: She is taking 0.25 mg lorazepam for her insomnia. She expressed that she has difficulty splitting these pills.    REVIEW OF SYSTEMS:   She notes that callus on her left foot has been causing her pain again. She goes to PT regularly for her back pain. She follows GI for her constipation. She notes that she cannot eat much food in one sitting. All other systems are negative.    PFSH:  Family: Her daughter has neuropathy and lupus.    TOBACCO USE:  History   Smoking Status     Former Smoker     Types: Cigarettes     Quit date: 12/11/1994   Smokeless Tobacco     Never Used       VITALS:  Vitals:    12/11/17 1426   BP: 170/82   Patient Site: Right Arm   Patient Position: Sitting    Cuff Size: Adult Large   Pulse: 80   Resp: 16   Temp: 98  F (36.7  C)   TempSrc: Oral   Weight: 153 lb 8 oz (69.6 kg)     Wt Readings from Last 3 Encounters:   12/11/17 153 lb 8 oz (69.6 kg)   11/24/17 151 lb 12.8 oz (68.9 kg)   11/20/17 151 lb (68.5 kg)     Body mass index is 24.78 kg/(m^2).    PHYSICAL EXAM:  GENERAL: Pleasant, well-appearing patient in no acute distress.   HEENT: Pupils equal round reactive to light. Sclerae and conjunctivae clear. TMs are clear bilaterally. Oropharynx is clear with moist mucous membranes.   NECK: Supple without lymphadenopathy, no carotid bruits   CARDIOVASCULAR: Heart regular rate and rhythm without murmur normal S1-S2   LUNGS: Clear to auscultation bilaterally, good air movement throughout   EXTREMITIES Warm and well-perfused without edema. Pedal pulses palpable and symmetric bilaterally. Left foot callus over plantar aspect of 5th MTP.  NEURO: Alert and oriented. Grossly nonfocal.   PSYCHIATRIC: Presents on time and well groomed. Normal speech and thought content. Full affect. No abnormal movements or behaviors noted.    ADDITIONAL HISTORY SUMMARIZED (2): Reviewed cardiologist note 11/13/17, regarding hypertension. Reviewed PT note for lower back pain, 11/27/17.  DECISION TO OBTAIN EXTRA INFORMATION (1): None.   RADIOLOGY TESTS (1): None.  LABS (1): Labs were ordered today.   MEDICINE TESTS (1): None.  INDEPENDENT REVIEW (2 each): None.     The visit lasted a total of 30 minutes face to face with the patient. Over 50% of the time was spent counseling and educating the patient about foot rash.    IIrene, am scribing for and in the presence of, Dr. Ortiz.    IDr. Ortiz, personally performed the services described in this documentation, as scribed by Irene Piña in my presence, and it is both accurate and complete.    MEDICATIONS:  Current Outpatient Prescriptions   Medication Sig Dispense Refill     acetaminophen (TYLENOL) 500 MG tablet Take 500 mg by mouth every 6  (six) hours as needed for pain. Back pain       amLODIPine (NORVASC) 2.5 MG tablet Take 1 tablet (2.5 mg total) by mouth daily. 30 tablet 12     cholecalciferol, vitamin D3, 1,000 unit tablet Take 1,000 Units by mouth daily.       citalopram (CELEXA) 20 MG tablet Take 1 tablet (20 mg total) by mouth daily. 90 tablet 0     docusate sodium (COLACE) 100 MG capsule Take 1 capsule (100 mg total) by mouth 2 (two) times a day as needed for constipation. 30 capsule 0     levothyroxine (SYNTHROID, LEVOTHROID) 137 MCG tablet Take 137 mcg by mouth daily.       LORazepam (ATIVAN) 0.5 MG tablet Take 0.5 tablets (0.25 mg total) by mouth every 8 (eight) hours as needed (anxiety or insomnia). 30 tablet 0     losartan (COZAAR) 50 MG tablet Take 50 mg by mouth 2 (two) times a day.       melatonin 3 mg Tab tablet Take 1 tablet (3 mg total) by mouth daily with supper. 30 tablet 0     metoprolol tartrate (LOPRESSOR) 25 MG tablet Take 1 tablet (25 mg total) by mouth 2 (two) times a day. 60 tablet 2     multivitamin with minerals (THERA-M) 9 mg iron-400 mcg Tab tablet Take 1 tablet by mouth daily.       omeprazole (PRILOSEC) 20 MG capsule Take 20 mg by mouth 2 (two) times a day before meals.       polyethylene glycol (GLYCOLAX) 17 gram/dose powder Take 17 g by mouth daily. Mix with water 255 g 0     PROPYLENE GLYCOL//PF (SYSTANE, PF, OPHT) Administer 1 drop to both eyes 4 (four) times a day as needed.        terbinafine HCl (LAMISIL) 1 % cream Apply to affected skin on feet twice daily for 2 weeks 30 g 1     No current facility-administered medications for this visit.        Total data points:3

## 2021-06-14 NOTE — PROGRESS NOTES
Callus Debridement Procedure Note     Indications: Painful callus of the left foot     Pre-procedure Diagnosis: Callus     Post-procedure Diagnosis: Same     Anesthesia: None required.      Procedure Details   Using 15 blade scalpel, one callus on the bottom of the left foot was debrided with good results     Estimated Blood Loss:  None      Complications:  None; patient tolerated the procedure well.     I, Marianne Conte, am scribing for and in the presence of Dr. Ortiz.  I, Dr. Ortiz, personally performed the services described in this documentation as scribed by Marianne Conte in my presence, and it is both accurate and complete.

## 2021-06-14 NOTE — PROGRESS NOTES
ASSESSMENT & PLAN:  Mehreen was seen today for rash and toe pain.    Diagnoses and all orders for this visit:    Rash  -     KOH Prep  KOH prep positive for fungal elements. After patient returned home, her daughter called to report the name of cream they had been using: triamcinolone. Recommend terbinafine cream twice daily for at least 2 weeks, rx sent.     Callus   Debrided today due to pain and concern for underlying abnormality or ulceration, based on amount of pain patient reported. After debridement, underlying skin is normal, no evidence of tissue breakdown. Patient had significant improvement in pain after callus was removed. Suspect this will recur, and recommend she see podiatry for recommendations.     Onychomycosis, tinea pedis, foot callus  -     Ambulatory referral to Podiatry    They were instructed to keep upcoming appointment with me for ongoing chronic issues which were not discussed today.      Patient Instructions   Call the clinic and read off the name of the cream you have been using.     Schedule an appointment with podiatry.        Orders Placed This Encounter   Procedures     VLAD Prep     Left foot     Ambulatory referral to Podiatry     Referral Priority:   Routine     Referral Type:   Consultation     Referral Reason:   Evaluation and Treatment     Requested Specialty:   Podiatry     Number of Visits Requested:   1     There are no discontinued medications.    No Follow-up on file.     CHIEF COMPLAINT:  Chief Complaint   Patient presents with     Rash     bilateral foot rash--itches, burns; has been applying a cream but she cant remember what it is     Toe Pain     c/o pain on left foot little toe; states it hurts underneath it when she steps on it        HISTORY OF PRESENT ILLNESS:  Mehreen is a 77 y.o. female presenting to the clinic today with her daughter for evaluation of a rash on her left foot and for pain under her left fifth toe.     Left Foot Rash: The rash doesn't itch, but it  burns and tingles. She has been applying the same cream that she has been using for years; she was using the cream on a small patch on her foot, but this rash is large. It has been like this for a couple weeks now. The rash was worse 4 days ago, and it is worse after wearing shoes without air circulation. The cream is the same cream she has been prescribed for 30 years or so. Her cardiologist thought she might have a fungus, and the swelling in her feet was from amlodipine; now she is taking 2.5 mg amlodipine. She is also taking losartan 50 mg and metoprolol 25 mg twice daily. She doesn't need to use the cream all the time, so she sometimes goes a long time without using it. She has had some toenails removed; she has had toenail fungus for a long time. She took an oral medication for fungus years ago and she didn't tolerate it. Her daughter is wondering if she should see podiatry to cut her toenails; she has a hard time reaching her own toenails. She is afraid of taking oral medication because it might cause stomach upset.     Right Second Hammertoe: Her right second toe is bending in a new way; it has never bent like this before.     Left Foot Callus under Fifth Digit: Her daughter notes she is also experiencing pain under her left fifth toe. The area hurts when she steps and puts pressure on it. She notes there might be something in there like a bone spur. It used to bother her a little, now it bothers her a lot. She is wondering if she has a bone spur. She would like an x-ray to know for certain if it's a bone spur. She feels like her left fifth toe is less painful today after callus debridement.     Vision Issue: She still has blurry vision after taking amlodipine. Saw eye doctor, normal exam per patient.    REVIEW OF SYSTEMS:   Complete review of systems reviewed in the HPI or otherwise negative.    PFSH:    TOBACCO USE:  History   Smoking Status     Former Smoker     Types: Cigarettes     Quit date: 12/11/1994    Smokeless Tobacco     Never Used        VITALS:  Vitals:    11/24/17 1421   BP: 154/80   Patient Site: Right Arm   Patient Position: Sitting   Cuff Size: Adult Regular   Pulse: 64   Resp: 16   Temp: 97.7  F (36.5  C)   TempSrc: Oral   Weight: 151 lb 12.8 oz (68.9 kg)     Wt Readings from Last 3 Encounters:   11/24/17 151 lb 12.8 oz (68.9 kg)   11/20/17 151 lb (68.5 kg)   11/13/17 151 lb 9.6 oz (68.8 kg)     Body mass index is 24.5 kg/(m^2).  No LMP recorded. Patient has had a hysterectomy.     PHYSICAL EXAM:  GENERAL:  Pleasant, well-appearing patient in no acute distress.  VITAL SIGNS:  Reviewed.  HEENT: Wearing glasses.   CARDIOVASCULAR:  Heart regular rate and rhythm without murmur.  Normal S1 and S2.  LUNGS:  Clear to auscultation bilaterally without wheezes or crackles.  Good air movement throughout.  FEET:  Right second hammertoe. Callus under the left fifth toe debrided today per the procedure note. Left foot with rash  - erythematous, moccasin distribution.   NEURO: Alert and oriented.   PSYCHIATRIC: Presents on time and well groomed.  Normal speech and thought content.  Full affect.  No abnormal movements or behaviors noted.     DATA REVIEWED:  ADDITIONAL HISTORY SUMMARIZED (2): Reviewed last spine care note 11/20/2017 regarding medications on file.   DECISION TO OBTAIN EXTRA INFORMATION (1): None.   RADIOLOGY TESTS (1): None.  LABS (1): Reviewed and ordered labs.  MEDICINE TESTS (1): None.  INDEPENDENT REVIEW (2 each): None.     The visit lasted a total of 30 minutes face to face with the patient. Over 50% of the time was spent counseling and educating the patient about calluses, onychomycosis, and treatment options.     IMarianne, am scribing for and in the presence of Dr. Ortiz.  I, Dr. Ortiz, personally performed the services described in this documentation, as scribed by Marianne Conte in my presence, and it is both accurate and complete.    This note has been dictated using voice recognition  software. Any grammatical or context distortions are unintentional and inherent to the software.     MEDICATIONS:  Current Outpatient Prescriptions   Medication Sig Dispense Refill     acetaminophen (TYLENOL) 500 MG tablet Take 500 mg by mouth every 6 (six) hours as needed for pain. Back pain       amLODIPine (NORVASC) 2.5 MG tablet Take 1 tablet (2.5 mg total) by mouth daily. 30 tablet 12     cholecalciferol, vitamin D3, 1,000 unit tablet Take 1,000 Units by mouth daily.       citalopram (CELEXA) 20 MG tablet Take 1 tablet (20 mg total) by mouth daily. 90 tablet 0     docusate sodium (COLACE) 100 MG capsule Take 1 capsule (100 mg total) by mouth 2 (two) times a day as needed for constipation. 30 capsule 0     levothyroxine (SYNTHROID, LEVOTHROID) 137 MCG tablet Take 137 mcg by mouth daily.       LORazepam (ATIVAN) 0.5 MG tablet TAKE ONE-HALF TO ONE TABLET BY MOUTH EVERY 8 HOURS AS NEEDED FOR ANXIETY OR  INSOMNIA 30 tablet 0     losartan (COZAAR) 50 MG tablet Take 50 mg by mouth 2 (two) times a day.       melatonin 3 mg Tab tablet Take 1 tablet (3 mg total) by mouth daily with supper. 30 tablet 0     metoprolol tartrate (LOPRESSOR) 25 MG tablet Take 1 tablet (25 mg total) by mouth 2 (two) times a day. 60 tablet 2     multivitamin with minerals (THERA-M) 9 mg iron-400 mcg Tab tablet Take 1 tablet by mouth daily.       omeprazole (PRILOSEC) 20 MG capsule Take 20 mg by mouth 2 (two) times a day before meals.       polyethylene glycol (GLYCOLAX) 17 gram/dose powder Take 17 g by mouth daily. Mix with water 255 g 0     PROPYLENE GLYCOL//PF (SYSTANE, PF, OPHT) Administer 1 drop to both eyes 4 (four) times a day as needed.        No current facility-administered medications for this visit.         Total data points: 3

## 2021-06-14 NOTE — PROGRESS NOTES
Assessment:   Mehreen Camara is a 77 y.o. y.o. female with past medical history significant for hypertension, hyperlipidemia, hypothyroidism, GERD, depression who presents today for follow-up regarding chronic left-sided low back/upper buttock pain without radicular symptoms.  Pain is likely due to sacroiliac joint dysfunction.  The patient is status post a left sacroiliac joint injection on November 6, 2017 which has provided 60% relief of her pain.  The patient also has depression which likely contributes to her overall pain picture.  She is neurologically intact.  -The patient does have a rash on her left foot.  Uncertain etiology.       Plan:     A shared decision making plan was used.  The patient's values and choices were respected.  The following represents what was discussed and decided upon by the physician assistant and the patient.      1.  DIAGNOSTIC TESTS: I reviewed the MRI lumbar spine.  No further diagnostic tests were ordered.    2.  PHYSICAL THERAPY: The patient is currently in physical therapy.  I encouraged to continue doing her home exercises.  She may try to switch physical therapist.  The patient states that her current physical therapist is not doing any manual treatment.  She has had improvement in her symptoms with manual treatment for low back pain in the past and she would like to try that again.    3.  MEDICATIONS: No changes are made to the patient's medications.  I recommended she continue using Tylenol as needed.    4.  INTERVENTIONS: No further interventions were ordered.  The patient has had 4 injections since April 2017.  I recommended that we hold off on any additional interventional pain management at this time.  If the patient's same pain were to flare back up we could repeat the left sacroiliac joint injection.  If her pain fails to improve further we could also consider facet joint injections.    5.  PATIENT EDUCATION: I recommended that the patient call her primary care  provider regarding her rash.  She could also go to the walk-in clinic.  She believes it is due to her amlodipine so she was cut also asked her cardiologist about this.  -The patient is in agreement with the above plan.  All questions were answered.    6.  FOLLOW-UP: Left low back/upper buttock pain.  Patient status post a left sacroiliac joint injection on November 6, 2017.  The patient reports this injection is provided 60% relief of her pain.  The patient will return to clinic in 4 weeks to follow-up.  She has any questions or concerns in the meantime, she should not contact our clinic.    Subjective:     Mehreen Camara is a 77 y.o. female who presents today for follow-up regarding she states that before the injection she was hardly able to walk so she has made quite significant improvement.    The patient continues to complain of pain localized to the left lower back/upper buttock.  The pain spans across the PSIS on the left.  She denies any pain radiating further down the leg.  She rates her pain today as a 6 out of 10.  At its best it is a 4 out of 10.  At its worst it is a 6 out of 10.  The patient's pain is aggravated with prolonged sitting and alleviated with applying heat and ice.  The patient has numbness and tingling in both feet.  She states this has come on over the past few months.  She is also noticed a rash on her left foot over the past month or so.  She believes it is from her amlodipine.  She states that she showed it to her cardiologist who felt it was likely fungal.  She has a history of fungal infection on her feet and she has been trying to use her antifungal cream but it has not improved.  She thinks that the numbness and tingling is due to the rash.  She feels generally weak.    The patient is currently in physical therapy.  She does not feel this very beneficial.  She states that she has had physical therapy before for her lower back which included manual treatments which was more  effective for her.  She uses Tylenol 4 times per day for her pain.    Past medical history is reviewed and is unchanged in the interim.    Family history is reviewed and is unchanged in the interim.    Review of Systems:  Positive for numbness/tingling, weakness, headache, dizziness, blurry vision.  Negative for loss of bowel/bladder control, footdrop emergent/vomiting, balance changes.     Objective:   CONSTITUTIONAL:  Vital signs as above.  No acute distress.  The patient is well nourished and well groomed.    PSYCHIATRIC:  The patient is awake, alert, oriented to person, place and time.  The patient is answering questions appropriately with clear speech.  Normal affect.  HEENT: Normocephalic, atraumatic.  Sclera clear.    SKIN:  Patient does have a rash on the left foot.  There are scattered macules.    MUSCULOSKELETAL:  Gait is guarded.   Mild tenderness over the bilateral lower lumbar paraspinal muscles.   Mild tenderness palpation of left sacroiliac joint.   The patient has 5/5 strength for the bilateral hip flexors, knee flexors/extensors, ankle dorsiflexors/plantar flexors, ankle evertors/invertors.    NEUROLOGICAL:Sensation to light touch is intact in the bilateral L4, L5, and S1 dermatomes.       RESULTS:   I reviewed the MRI lumbar spine from March 7, 2017.  This shows moderate multilevel degenerative disc and facet disease superimposed on a 35  lumbar levoscoliosis.  The patient has moderate left lateral recess stenosis at L4-5 as well as moderate foraminal stenosis on the left at L4-5 with less significant lateral recess and foraminal stenosis elsewhere.  There are left lateral osteophytes at L3-4, L4-5, and L5-S1 which may contact the nerve roots after they have exited the neural foramina.  The patient has facet arthropathy which is mild from T12-L1 through L2-3, moderate at L3-4 and L4-5, and mild to moderate at L5-S1.  Please see report for further details.

## 2021-06-14 NOTE — PROGRESS NOTES
"Cardiology Progress Note    Assessment:    Hypertension, recumbent with pronounced orthostatic blood pressure drop likely related to autonomic dysfunction and rigidity of central vasculature; amyloidosis is possible although EKG is more suggestive of hypertensive heart disease then protein deposition  Peripheral edema, improved after discontinuation of Florinef and reducing dose of amlodipine  History of abdominal aortic aneurysm stenting  Aortic stenosis, mild to moderate    Plan:  She has not had any high blood pressure readings lately.  I think we can further reduce amlodipine to 2.5 mg a day.  Serum protein electrophoresis to look for monoclonal spikes    Follow-up in 1 month    Subjective:   This is 77 y.o. female who comes in today for follow-up visit.  She brought her blood pressure readings from home.  Systolic blood pressures typically 120s and drops to 90s when she stands up.  She continues to have mild swelling of lower extremities.  She denies chest pain.  She has not had syncope.    Review of Systems:   General: Weight Loss  Eyes: Visual Distubance  Ears/Nose/Throat: WNL  Lungs: WNL  Heart: WNL  Stomach: Constipation, Heartburn  Bladder: Frequent Urination at Night  Muscle/Joints: Joint Pain, Muscle Weakness, Muscle Pain  Skin: WNL  Nervous System: Dizziness  Mental Health: Depression, Anxiety     Blood: WNL    Objective:   BP (!) 78/50 (Patient Site: Left Arm, Patient Position: Sitting, Cuff Size: Adult Regular)  Pulse 64  Resp 20  Ht 5' 6\" (1.676 m) Comment: shoes on  Wt 151 lb 9.6 oz (68.8 kg) Comment: shoes on  BMI 24.47 kg/m2  Physical Exam:  GENERAL: no distress  NECK: No JVD  LUNGS: Clear to auscultation.  CARDIAC: regular rhythm, S1 & S2 normal.  No heaves, thrills, gallops or murmurs.  ABDOMEN: flat, negative hepatosplenomegaly, soft and non-tender.  EXTREMITIES: No evidence of cyanosis, clubbing or edema.    Current Outpatient Prescriptions   Medication Sig Dispense Refill     " acetaminophen (TYLENOL) 500 MG tablet Take 500 mg by mouth every 6 (six) hours as needed for pain. Back pain       amLODIPine (NORVASC) 2.5 MG tablet Take 1 tablet (2.5 mg total) by mouth daily. 30 tablet 12     cholecalciferol, vitamin D3, 1,000 unit tablet Take 1,000 Units by mouth daily.       citalopram (CELEXA) 20 MG tablet Take 1 tablet (20 mg total) by mouth daily. 90 tablet 0     docusate sodium (COLACE) 100 MG capsule Take 1 capsule (100 mg total) by mouth 2 (two) times a day as needed for constipation. 30 capsule 0     levothyroxine (SYNTHROID, LEVOTHROID) 137 MCG tablet Take 137 mcg by mouth daily.       LORazepam (ATIVAN) 0.5 MG tablet TAKE ONE-HALF TO ONE TABLET BY MOUTH EVERY 8 HOURS AS NEEDED FOR ANXIETY OR  INSOMNIA 30 tablet 0     losartan (COZAAR) 50 MG tablet Take 50 mg by mouth 2 (two) times a day.       melatonin 3 mg Tab tablet Take 1 tablet (3 mg total) by mouth daily with supper. 30 tablet 0     metoprolol tartrate (LOPRESSOR) 25 MG tablet Take 1 tablet (25 mg total) by mouth 2 (two) times a day. 60 tablet 2     multivitamin with minerals (THERA-M) 9 mg iron-400 mcg Tab tablet Take 1 tablet by mouth daily.       omeprazole (PRILOSEC) 20 MG capsule Take 20 mg by mouth 2 (two) times a day before meals.       polyethylene glycol (GLYCOLAX) 17 gram/dose powder Take 17 g by mouth daily. Mix with water 255 g 0     PROPYLENE GLYCOL//PF (SYSTANE, PF, OPHT) Administer 1 drop to both eyes 4 (four) times a day as needed.        No current facility-administered medications for this visit.        Cardiographics:    ECG: Sinus rhythm LVH voltage     Echocardiogram: May 2017    Left Ventricle: Normal size.The calculated left ventricular ejection fraction is 71%. This represents a normal ejection fraction. Moderate hypertrophy noted. E/e' > 15, suggesting elevated LV filling pressures.    Aortic Valve: The valve is tricuspid. There is mild global calcification with reduced excursion of the aortic  valve present. Mild to moderate stenosis.    Texture of myocardium and small pericardial effusion raises possibility of cardiac amyloidosis     Stress Test: November 2015  1. Lexiscan stress nuclear study is negative for inducible myocardial   ischemia or infarction.     Lab Results:       Lab Results   Component Value Date    CHOL 256 (H) 09/22/2015    CHOL 241 (H) 03/10/2015    CHOL 252 (H) 11/10/2014     Lab Results   Component Value Date    HDL 43 09/22/2015    HDL 43 03/10/2015    HDL 35 (L) 11/10/2014     Lab Results   Component Value Date    LDLCALC 195 (H) 09/22/2015    LDLCALC 168 (H) 03/10/2015    LDLCALC 174 (H) 11/10/2014     Lab Results   Component Value Date    TRIG 91 09/22/2015    TRIG 149 03/10/2015    TRIG 216 (H) 11/10/2014     No components found for: CHOLHDL  BNP   Date Value Ref Range Status   10/25/2015 69 0 - 137 pg/mL Final       Parag (Hector)  MD Adriane

## 2021-06-14 NOTE — PROGRESS NOTES
"[facility] Daily Progress     Patient Name: Mehreen Camara  Date: 11/20/2017  Visit #: 3/4-6  PTA visit #:  -  Referral Diagnosis: chronic low back pain, SIJ pain on L side  Referring provider: Dr. Sara Crenshaw  Visit Diagnosis:     ICD-10-CM    1. Chronic bilateral low back pain with bilateral sciatica M54.42     M54.41     G89.29    2. Generalized muscle weakness M62.81    3. Unsteadiness on feet R26.81    4. Decreased ROM of lumbar spine M25.60    5. Chronic left shoulder pain M25.512     G89.29    6. Decreased ROM of left shoulder M25.612    7. Sacral pain M53.3    8. Acute pain of left hip M25.552          Assessment:   Patient states today that she's can do 10 reps both sides of abd set+ marching because she's too weak and has \"blood pressure problems\". She is unable to do exercises on her side due to her shoulder \"bone on bone\".  Patient became tearful while sitting due to pain and rash on feet, then asked to lie down. Upon sitting again, she requested that her BP be taken, as she always needs her BP taken after she sits up. \"I don't want to have to explain my blood pressure problem to you\". Today's session was terminated early due to patient limiting herself in participation of therapy.    HEP/POC compliance is  good .  Patient demonstrates understanding/independence with home program.  Patient is appropriate to continue with skilled physical therapy intervention, as indicated by initial plan of care.    Goal Status:  Pt. will demonstrate/verbalize independence in self-management of condition in : 6 weeks  Pt. will be independent with home exercise program in : 6 weeks  Pt. will have improved quality of sleep: with less pain;waking less times/night;in 6 weeks  Patient will stand : 10 minutes;in 6 weeks;with less difficultty;with less pain;for home chores  Pt will: no longer have radicular sympotoms into bilateral LE's within 8 weeks in order to imporove QOL.    Plan / Patient " "Education:     Continue with initial plan of care.  Progress with home program as tolerated.     Exercises:  Exercise #1: supine glut/piriformis stretches  Comment #1: 30\" holds, 2-3 reps each side  Exercise #2: abd set+single leg march  Comment #2: x10 with 5\" holds  Exercise #3: glut set  Comment #3: x10 with 5\" holds  Exercise #4: SIJ muscle energy- anter correction  Comment #4: x10 with 5\" holds  Exercise #5: seated LE strengthening- marching, LAQ, heel/toe raises, hip abd  Comment #5: x10-15 B with 5\" holds     Plan for next visit: review HEP and discharge. Try to add in clamshell and abd set +march if able.     Subjective:     Pain Ratin/10    Feeling a little better. Started getting a sore spot in the left lower aspect of her back yesterday- isn't sure what the cause is. Got a rash on her feet- seeing her doctor about that. Doing her HEP 2-3x/day. Isn't sure if the exercises or her injection are helping her pain.       Objective:     BP while sitting today: 176/106,  175/105   In standin/86   HR: 77 bpm    Difficulty with transfers and bed mobility  Gait: use of SEC, shuffled, slow gait    Treatment Today     TREATMENT MINUTES COMMENTS   Evaluation     Self-care/ Home management     Manual therapy     Neuromuscular Re-education     Therapeutic Activity     Therapeutic Exercises 16 Discussed progress. Took BP x3 per patient request. Attempted to progressed HEP today, but unable due to patient stating that she couldn't do more reps of supine exercises. She declined s/l exercises. After she sat up, she declined any other treatments due to BP being high.    Gait training     Modality__________________                Total 16    Blank areas are intentional and mean the treatment did not include these items.       Kash Ortiz, PT, DPT  2017    "

## 2021-06-14 NOTE — PROGRESS NOTES
"Cardiology Progress Note    Assessment:  Hypertension, controlled with only mild orthostatic drop  in the office today  Peripheral edema, improved after discontinuation of Florinef and reducing dose of amlodipine  History of abdominal aortic aneurysm stenting  Aortic stenosis, mild to moderate      Plan:  I believe he can safely discontinue amlodipine altogether.  She will continue to check her blood pressure at home.  I instructed her to call me if systolic blood pressure stays above 150 on a consistent basis.  We may need to add diuretic if lower extremity edema persists.    Subjective:   This is 77 y.o. female who comes in today for follow-up visit.  She continues to feel easily fatigued.  She she has mild swelling of lower extremities.  She was treated for athlete's foot without much relief.  She has not had syncope.    Review of Systems:   General: Weight Loss  Eyes: Visual Distubance  Ears/Nose/Throat: WNL  Lungs: WNL  Heart: Arm Pain, Leg Swelling  Stomach: Constipation, Heartburn  Bladder: Frequent Urination at Night  Muscle/Joints: Joint Pain, Muscle Weakness  Skin: Rash  Nervous System: Daytime Sleepiness  Mental Health: Depression, Anxiety     Blood: Easy Bruising    Objective:   /80 (Patient Site: Left Arm, Patient Position: Sitting, Cuff Size: Adult Regular)  Pulse 76  Resp 16  Ht 5' 6\" (1.676 m)  Wt 150 lb (68 kg)  BMI 24.21 kg/m2  Physical Exam:  GENERAL: no distress  NECK: No JVD  LUNGS: Clear to auscultation.  CARDIAC: regular rhythm, S1 & S2 normal.  No heaves, thrills, gallops, soft ejection murmur at the aortic area  ABDOMEN: flat, negative hepatosplenomegaly, soft and non-tender.  EXTREMITIES: No evidence of cyanosis, clubbing 1+ edema.    Current Outpatient Prescriptions   Medication Sig Dispense Refill     acetaminophen (TYLENOL) 500 MG tablet Take 500 mg by mouth every 6 (six) hours as needed for pain. Back pain       cholecalciferol, vitamin D3, 1,000 unit tablet Take 1,000 Units " by mouth daily.       citalopram (CELEXA) 20 MG tablet Take 1 tablet (20 mg total) by mouth daily. 90 tablet 0     docusate sodium (COLACE) 100 MG capsule Take 1 capsule (100 mg total) by mouth 2 (two) times a day as needed for constipation. 30 capsule 0     levothyroxine (SYNTHROID, LEVOTHROID) 137 MCG tablet Take 137 mcg by mouth daily.       LORazepam (ATIVAN) 0.5 MG tablet Take 0.5 tablets (0.25 mg total) by mouth every 8 (eight) hours as needed (anxiety or insomnia). 30 tablet 0     losartan (COZAAR) 50 MG tablet Take 50 mg by mouth 2 (two) times a day.       melatonin 3 mg Tab tablet Take 1 tablet (3 mg total) by mouth daily with supper. 30 tablet 0     metoprolol tartrate (LOPRESSOR) 25 MG tablet Take 1 tablet (25 mg total) by mouth 2 (two) times a day. 60 tablet 2     multivitamin with minerals (THERA-M) 9 mg iron-400 mcg Tab tablet Take 1 tablet by mouth daily.       omeprazole (PRILOSEC) 20 MG capsule Take 20 mg by mouth 2 (two) times a day before meals.       polyethylene glycol (GLYCOLAX) 17 gram/dose powder Take 17 g by mouth daily. Mix with water 255 g 0     PROPYLENE GLYCOL//PF (SYSTANE, PF, OPHT) Administer 1 drop to both eyes 4 (four) times a day as needed.        terbinafine HCl (LAMISIL) 1 % cream Apply to affected skin on feet twice daily for 2 weeks 30 g 1     triamcinolone (KENALOG) 0.1 % cream Apply thin layer to feet up to twice daily as needed 30 g 0     No current facility-administered medications for this visit.        Cardiographics:    ECG: Sinus rhythm LVH voltage      Echocardiogram: May 2017    Left Ventricle: Normal size.The calculated left ventricular ejection fraction is 71%. This represents a normal ejection fraction. Moderate hypertrophy noted. E/e' > 15, suggesting elevated LV filling pressures.    Aortic Valve: The valve is tricuspid. There is mild global calcification with reduced excursion of the aortic valve present. Mild to moderate stenosis.    Texture of myocardium  and small pericardial effusion raises possibility of cardiac amyloidosis      Stress Test: November 2015  1. Lexiscan stress nuclear study is negative for inducible myocardial   ischemia or infarction.     Lab Results:     Results from last 7 days  Lab Units 12/11/17  1526   LN-SODIUM mmol/L 138   LN-POTASSIUM mmol/L 4.5   LN-CHLORIDE mmol/L 97*   LN-CO2 mmol/L 31   LN-BLOOD UREA NITROGEN mg/dL 11   LN-CREATININE mg/dL 0.59*   LN-CALCIUM mg/dL 9.4     Lab Results   Component Value Date    CHOL 256 (H) 09/22/2015    CHOL 241 (H) 03/10/2015    CHOL 252 (H) 11/10/2014     Lab Results   Component Value Date    HDL 43 09/22/2015    HDL 43 03/10/2015    HDL 35 (L) 11/10/2014     Lab Results   Component Value Date    LDLCALC 195 (H) 09/22/2015    LDLCALC 168 (H) 03/10/2015    LDLCALC 174 (H) 11/10/2014     Lab Results   Component Value Date    TRIG 91 09/22/2015    TRIG 149 03/10/2015    TRIG 216 (H) 11/10/2014     No components found for: CHOLHDL  BNP   Date Value Ref Range Status   10/25/2015 69 0 - 137 pg/mL Final     Serum electrophoresis was negative for monoclonal spikes    Parag Forrest MD (Ted)

## 2021-06-15 NOTE — PROGRESS NOTES
RN Recommendations and Referrals  See below for action plan    Action Plan    RN Will  Will not add the patient to [facility] tracking list  Be available to the patient as nursing needs arise    Care Guide Will  Continue outreach to support current goal.     Goals  Goals        Patient Stated      I would like to keep my blood pressure under good control.  (pt-stated)            Action steps to achieve this goal  -  I will continue to check my blood pressure three times a day and log them.  -  If I see >1 day of BPs that are consistently >160/90 or >1 day of BPs that are <90/60 AND I feel hypotensive (NEW dizziness, lightheadedness) I will call the clinic.  -  I will continue to watch my sodium intake and not add additional salt to things.  -  I will continue to eat healthy snacks, ex fruits and veggies, bananas, apples, grapes.   -  I will follow up with Zhou as needed.                   Clinic Care Coordination RN Assessed Needs  RN assessment with currently enrolled patient by phone. Patient lives in a independent senior apartment building with elevator access, daily social events to attend, and no charge transportation for outings to [facility], [facility], etc. Her daughters live in [place] and they do provide transportation to [facility], [facility], and other outings. Patient has [employer] card and have not used the service. In In July 2017, patient received Home Health Care services and met with a  and also was connected to the [facility] for information about Medical Assistance, EW services and community resources & services. Patient denies requiring services to help her and that she is independent with doing ADL's and IADL's. Denies difficulties with weekly pill box set up and her daughters are available to help her if needed. She enjoys the support from care guide outreach.     Patient Centered Assessment Method-PCAM TOTAL SCORE: 15 (2/2/2018 10:32  "AM)  Level 1:  A score of 12-24 indicates that the patient has very little to no initial need for RN or SW intervention.  Standard care guide outreach/support should be appropriate at the discretion of the .  The care guide can reach out to the RN/SW as needed for support or with new concerns.    PCAM (Patient Centered Assessment Method)   HEALTH AND WELL-BEING  Other Physical Health Concerns:: HTN  RN Assessment: Physical Health Needs: No identified areas of uncertainty or problems already being investigated  RN Assessment: Physical Health Problems: Mild impact on mental well-being e.g. \"feeling fed-up\", \"reduced enjoyment\"  Mental Health Concerns: Anxiety, Denies concerns that require further investigation, Depression  Other Mental Health Concerns:: States medications are helping with mood and anxiety  RN Assessment:Other Mental Well-Being Concern: No identified areas of concern  Lifestyle/Habit Concerns: Denies concerns that require further investigation  RN Assessment: Lifestyle Behaviors: No identified areas of concern  SOCIAL ENVIRONMENT  Home Environment Concerns: Denies concerns that require further investigation  Other Home Environment Concerns:: [employer] offers daily social events to particpate.   RN Assessment: Home Environment: Consistently safe, supportive, stable, no identified problems  Daily Activities Concerns: Denies concerns that require further investigation  Other Daily Activities Concerns:: Daughters lives in [place] [facility] and provide transportation weekly and as needed and daily safety phone call check in   RN Assessment: Daily Activites: No identified problems or perceived positive benefits  Social Network Concerns: Denies concerns that require further investigation  Other Social Network Concerns:: [employer] proivde free van transportation to [facility], [facility], [facility], [place], etc.   RN Assessment: Social Network: Adequate participation with social " networks  Financial Status and Service Concerns: Denies concerns that require further investigation  Other Financial Status and Service Concerns:: Rent is on auto pay. Write checks or use credit cards  for when shopping.   RN Assessment: Financial Resources: Financially secure, resources adequate, no identified problems  HEALTH LITERACY AND COMMUNICATION  Understanding of Health and Wellbeing Concerns: Reasonable to good understanding but does not feel able to engage with advise at this time  RN Assessment: Health Literacy: Reasonable to good understanding and already engages in managing health or is willing to undertake better management  Engagement Concerns: Adequate communication, with or without minor barriers  RN Assessment: Engagement: Clear and open communication, no identified barriers  Barriers to Compliance with Medical Recommendations: Denies concerns that require further investigation  SERVICE COORDINATION  Other Services: Other care/services not required at this time  Coordination of Services: Required care/services in place and adequately coordinated  PCAM TOTAL SCORE: 15      Emergency Plan  Depression  Everyone feels down at times. The blues are a natural part of life. But an unhappy period that s intense or lasts for more than a couple of weeks can be a sign of depression. Depression is a serious illness. It can disrupt the lives of family and friends. If you know someone you think may be depressed, find out what you can do to help.    Know the serious signals  Warning signals for suicide include:    Threats or talk of suicide    Statements such as  I won t be a problem much longer  or  Nothing matters     Giving away possessions or making a will or  arrangements    Buying a gun or other weapon    Sudden, unexplained cheerfulness or calm after a period of depression  If you notice any of these signs, get help right away. Call a health care professional, mental health clinic, or suicide  hotline and ask what action to take. In an emergency, don t hesitate to call the police.    [facility] Mental Health Crisis Lines:  [facility] [phone]  [facility] [phone]  [facility] [phone]  [facility] [phone]  [facility], Adults [phone]  [facility], Children [phone]  [facility], Adults [phone]  [facility], Children [phone]    Preventing Falls    Having a health problem can make you more likely to fall. Taking certain kinds of medicines may also increase your risk of falls. So, improving your health can help you avoid a fall. Work with your healthcare provider to manage health problems and to review your medicines. If you have your health under control, your risk of falling is lessened.    When to call your healthcare provider  Be sure to call your healthcare provider if you fall. Also call if you have any of these signs or symptoms (someone else may need to point them out to you):    Feeling lightheaded or dizzy more than once a day    Losing your balance often or feeling unsteady on your feet    Feeling numbness in your legs or feet, or noticing a change in the way you walk    Having a steady decline in your memory or mental sharpness    High Blood Pressure (Hypertension)  You have been diagnosed with high blood pressure (also called hypertension). This means the force of blood against your artery walls is too strong. It also means your heart is working hard to move blood. High blood pressure usually has no symptoms, but over time, it can damage your heart, blood vessels, eyes, kidneys, and other organs. With help from your doctor, you can manage your blood pressure and protect your health.  Taking medications    Learn to take your own blood pressure. Keep a record of your results. Ask your doctor which readings mean that you need medical attention.    Take your blood pressure medication exactly as directed. Don t skip doses. Missing  doses can cause your blood pressure to get out of control.    Avoid medications that contain heart stimulants, including over-the-counter drugs. Check for warnings about high blood pressure on the label.    Check with your doctor before taking a decongestant. Some decongestants can worsen high blood pressure.  Lifestyle changes    Maintain a healthy weight. Get help to lose any extra pounds.    Cut back on salt.  o Limit canned, dried, packaged, and fast foods.  o Don t add salt to your food at the table.  o Season foods with herbs instead of salt when you cook.    Follow the DASH (Dietary Approaches to Stop Hypertension) eating plan. This plan recommends vegetables, fruits, whole gains, and other heart healthy foods.    Begin an exercise program. Ask your doctor how to get started. The American Heart Association recommends aerobic exercise 3 to 4 times a week for an average of 40 minutes at a time, with your doctor's approval. Simple activities like walking or gardening can help.    Break the smoking habit. Enroll in a stop-smoking program to improve your chances of success. Ask your health care provider about programs and medications to help you stop smoking.    Limit drinks that contain caffeine (coffee, black or green tea, cola) to 2 per day.    Never take stimulants such as amphetamines or cocaine; these drugs can be deadly for someone with high blood pressure.    Control your stress. Learn stress-management techniques.    Limit alcohol to no more than 1 drink a day for women and 2 drinks a day for men.  Follow-up care  Make a follow-up appointment as directed by our staff.     When to seek medical care  Call your doctor immediately if you have any of the following:    Chest pain or shortness of breath (call 911)    Moderate to severe headache    Weakness in the muscles of your face, arms, or legs    Trouble speaking    Extreme drowsiness    Confusion    Fainting or dizziness    Pulsating or rushing sound in  your ears    Unexplained nosebleed    Weakness, tingling, or numbness of your face, arms, or legs    Change in vision    Blood pressure measured at home that is greater than 180/110

## 2021-06-15 NOTE — PROGRESS NOTES
Pt has apt with cardiology, will check back with pt next week to follow up and update care plan.  documented as no longer pcp as of 1/11/18.       RUPERTO- Name and what services she is receiving?  [employer] set up?  New Provider?

## 2021-06-15 NOTE — PROGRESS NOTES
ASSESSMENT: Onychauxis, foot pain.    PLAN: Toenails were debrided manually and mechanically x9. Return to clinic in nine weeks.         SUBJECTIVE: The patient returns to the [facility] with toenails that are long, thick and painful.  She has tried to cut the toenails herself over the past several years, but ends up with bleeding and pain. She denies other injury.  Attempts have been made to remove the left first and second toenails, but they have grown back.  The right fourth toenail is permanently absent. The toenails were last debrided in the clinic on May 2, 2017.    OBJECTIVE:  General: Pleasant 77 y.o. female in no acute distress.  Vascular: DP pulses are absent. PT pulses are absent. Pedal hair is absent. Feet are warm to the touch.  Cardiac: Pulse is not palpable.  Lymphatic: No edema.  Neuro: Sensation in the feet is grossly intact to light touch.  Derm: Toenails are elongated, thickened and dystrophic with discoloration and subungual debris.  The right fourth toenail is surgically absent.  Skin is thin and shiny but intact.   Musculoskeletal: Adequate passive range of motion and foot and ankle joints.

## 2021-06-15 NOTE — PROGRESS NOTES
Per [facility] policy, patient needs an RN assessment, Attempt 1: Care Guide called patient.  If this patient is returning my call, please transfer to Veronica at ext [phone].

## 2021-06-15 NOTE — PROGRESS NOTES
"Cardiology Progress Note    Assessment:  Hypertension,  labile with history of pronounced orthostatic drops; symptomatically improved; negative screening for amyloidosis  Peripheral edema  History of abdominal aortic aneurysm stenting  Aortic stenosis, mild to moderate      Plan:  Restart hydrochlorothiazide 12.5 mg once a day  Follow-up in 1 month  Subjective:   This is 78 y.o. female who comes in today for visit.  She reports that she is feeling better.  She is less lightheaded.  Her energy level has improved.  She continues to have lower extremity edema.  Her blood pressure fluctuates from systolic of 170 to 120.    Review of Systems:   General: Weight Gain  Eyes: Visual Distubance  Ears/Nose/Throat: WNL  Lungs: WNL  Heart: Leg Swelling  Stomach: Constipation  Bladder: WNL  Muscle/Joints: Joint Pain  Skin: WNL  Nervous System: WNL  Mental Health: WNL     Blood: WNL    Objective:   /72 (Patient Site: Right Arm, Patient Position: Sitting, Cuff Size: Adult Regular)  Pulse 68  Resp 16  Ht 5' 6\" (1.676 m) Comment: shoes on  Wt 158 lb 12.8 oz (72 kg) Comment: shoes on  BMI 25.63 kg/m2  Physical Exam:  GENERAL: no distress  NECK: No JVD  LUNGS: Clear to auscultation.  CARDIAC: regular rhythm, S1 & S2 normal.  No heaves, thrills, gallops or murmurs.  ABDOMEN: flat, negative hepatosplenomegaly, soft and non-tender.  EXTREMITIES: No evidence of cyanosis, clubbing,1+edema.    Current Outpatient Prescriptions   Medication Sig Dispense Refill     acetaminophen (TYLENOL) 500 MG tablet Take 500 mg by mouth every 6 (six) hours as needed for pain. Back pain       cholecalciferol, vitamin D3, 1,000 unit tablet Take 1,000 Units by mouth daily.       citalopram (CELEXA) 20 MG tablet Take 1 tablet (20 mg total) by mouth daily. 90 tablet 0     docusate sodium (COLACE) 100 MG capsule Take 1 capsule (100 mg total) by mouth 2 (two) times a day as needed for constipation. 30 capsule 0     levothyroxine (SYNTHROID, LEVOTHROID) " 137 MCG tablet Take 137 mcg by mouth daily.       LORazepam (ATIVAN) 0.5 MG tablet Take 0.5 tablets (0.25 mg total) by mouth every 8 (eight) hours as needed (anxiety or insomnia). 30 tablet 0     losartan (COZAAR) 50 MG tablet Take 50 mg by mouth 2 (two) times a day.       melatonin 3 mg Tab tablet Take 1 tablet (3 mg total) by mouth daily with supper. 30 tablet 0     metoprolol tartrate (LOPRESSOR) 25 MG tablet Take 1 tablet (25 mg total) by mouth 2 (two) times a day. 60 tablet 2     multivitamin with minerals (THERA-M) 9 mg iron-400 mcg Tab tablet Take 1 tablet by mouth daily.       omeprazole (PRILOSEC) 20 MG capsule Take 20 mg by mouth 2 (two) times a day before meals.       polyethylene glycol (GLYCOLAX) 17 gram/dose powder Take 17 g by mouth daily. Mix with water 255 g 0     PROPYLENE GLYCOL//PF (SYSTANE, PF, OPHT) Administer 1 drop to both eyes 4 (four) times a day as needed.        terbinafine HCl (LAMISIL) 1 % cream Apply to affected skin on feet twice daily for 2 weeks 30 g 1     triamcinolone (KENALOG) 0.1 % cream Apply thin layer to feet up to twice daily as needed 30 g 0     hydroCHLOROthiazide (HYDRODIURIL) 12.5 MG tablet Take 1 tablet (12.5 mg total) by mouth daily. 30 tablet 12     No current facility-administered medications for this visit.        Cardiographics:    ECG: Sinus rhythm LVH voltage      Echocardiogram: May 2017    Left Ventricle: Normal size.The calculated left ventricular ejection fraction is 71%. This represents a normal ejection fraction. Moderate hypertrophy noted. E/e' > 15, suggesting elevated LV filling pressures.    Aortic Valve: The valve is tricuspid. There is mild global calcification with reduced excursion of the aortic valve present. Mild to moderate stenosis.    Texture of myocardium and small pericardial effusion raises possibility of cardiac amyloidosis      Stress Test: November 2015  1. Lexiscan stress nuclear study is negative for inducible myocardial    ischemia or infarction.     Lab Results:       Lab Results   Component Value Date    CHOL 256 (H) 09/22/2015    CHOL 241 (H) 03/10/2015    CHOL 252 (H) 11/10/2014     Lab Results   Component Value Date    HDL 43 09/22/2015    HDL 43 03/10/2015    HDL 35 (L) 11/10/2014     Lab Results   Component Value Date    LDLCALC 195 (H) 09/22/2015    LDLCALC 168 (H) 03/10/2015    LDLCALC 174 (H) 11/10/2014     Lab Results   Component Value Date    TRIG 91 09/22/2015    TRIG 149 03/10/2015    TRIG 216 (H) 11/10/2014     No components found for: CHOLHDL  BNP   Date Value Ref Range Status   10/25/2015 69 0 - 137 pg/mL Final       Parag (Hector)  MD Adriane

## 2021-06-15 NOTE — PROGRESS NOTES
Schedule phone RN assessment with patient for a safety evaluation and update of care plan. Enrolled in [facility] since November 2015.

## 2021-06-16 NOTE — PROGRESS NOTES
ASSESSMENT & PLAN:  1. Hypertension  She continues to follow closely with cardiology, has an appointment there in May.  Recommend she continue her current antihypertensive regimen at this time.  Defer to cardiology regarding bradycardia, but does not appear severe at this time.  She has not had lightheaded episodes or feeling like passing out.  She did have thyroid lab checked in December which was normal.  - spironolactone (ALDACTONE) 25 MG tablet; Take 1 tablet (25 mg total) by mouth daily.  Dispense: 30 tablet; Refill: 1    2. Vaginal itching  Wet prep collected, results returned after patient left clinic and was positive for yeast.  Would recommend she use an over-the-counter yeast vaginitis cream such as Monistat and let us know if not improving.  I think a lot of her symptoms are likely from skin irritation from moisture and pads that she has been wearing over the last month.  Recommend she change pads frequently to avoid chronic wetness.  - Wet Prep, Vaginal    3.  Intermittent blurry vision  Could be related to dry eyes.  Some of her medications also could cause accommodation disturbance.  I would not recommend any medication change at this point, however.    4.  Anxiety  I would encourage her to continue with her current citalopram dose.  She does that it helps, but I do suspect she is doing better with it than she was without it.    5.  Left leg erythema and warmth, suspicious for cellulitis  She has venous stasis changes bilaterally, and tinea pedis and onychomycosis bilaterally, now with redness and warmth of the left foot and an area of the shin since yesterday.  Suspect early cellulitis.  Recommend she start clindamycin (penicillin and cephalosporin allergy).  She was given just a brief course of 5 days, as symptoms do appear mild, and there is risk for side effect (diarrhea, C. difficile).  She will let us know if not resolved with the 5 days of treatment, and would advise extending her course of  "antibiotics.  Recommend she continue to use the athlete's foot cream on her feet bilaterally.    There are no Patient Instructions on file for this visit.    No orders of the defined types were placed in this encounter.    There are no discontinued medications.    No Follow-up on file.    CHIEF COMPLAINT:  Chief Complaint   Patient presents with     Medication Questions     pt states for the past month she feels like her \"water pill\" is drying out her mouth and eyes.  she notes for the last month she has vaginal itching     Eye Problem     pt states for the last few months her vision gets blurry in the morning after she takes her meds     Bradycardia     pt was having episodes of low pulse along with the felling of weakness       HISTORY OF PRESENT ILLNESS:  Mehreen is a 78 y.o. female presenting to the clinic today for vision problems, medication check, and low pulse. She is accompanied by her daughter.    Eye Issue: She notes that she cannot engage in many of her usual activities as she cannot see clearly. She was told by ophthalmologist that her prescription cannot be increased. She suspects the blurred vision occurs after she takes her morning medication.  She had reported this for quite some time related to amlodipine and she was convinced it was related to that medication, which she is no longer on.  She wakes up with clear vision in the morning. She has a history of cataract surgery. She suspects one of her medications is drying out her eyes and mouth. Was last seen by Dr. Forrest and was switched from hydrochlorothiazide to spironolactone for hypertension. Currently follows with [facility]; we do not have documentation of visits at this time.    Depression: She continues on 20 mg citalopram daily and lorazepam as needed. She does not know if these do much.  She notes that she is eating more on this medication. Daughter states that it is mostly healthy snacks like rice cakes and fresh " fruit.    Bradycardia: She has been having episodes of low pulse.  At times she feels weak.  Pulse has been in the 50s at the lowest.  She also has several readings in the 60s and above.  She also feels tired. Her daughter thinks she is just used to having a high pulse. Her daughter also notes that she is more sedentary now.     Foot Issue: She has had inflammation and swelling in both feet for the past couple of months. Her left foot is more swollen and inflammed than the right. The color and swelling seems to come and go and can affect either foot. Her feet are better on some days than others. Feet sometimes feel warm and painful. Was told 3 months ago that she had athletes food. Daughter notes that they have brought this up to her cardiologist, who suggested a water pill.     Vaginal Itching: She has had vaginal itching for over 1 month. No discharge or bleeding. Daughter suspects it may be an external skin infection. She suspects it is caused by her wiping after a bowel movement. The itching affects the external area around her genitals. It is painful when she tries to clean the area. She wears pads for urinary leakage.     REVIEW OF SYSTEMS:   She takes Miralax daily for constipation. States that she gets constipated easily. All other systems are negative.    PFSH:  Reviewed as below.    TOBACCO USE:  History   Smoking Status     Former Smoker     Types: Cigarettes     Quit date: 12/11/1994   Smokeless Tobacco     Never Used       VITALS:  Vitals:    03/23/18 1512   BP: 134/80   Patient Site: Left Arm   Patient Position: Sitting   Cuff Size: Adult Regular   Pulse: 62   Resp: 16   Temp: 97.9  F (36.6  C)   TempSrc: Oral   Weight: 146 lb (66.2 kg)     Wt Readings from Last 3 Encounters:   03/23/18 146 lb (66.2 kg)   02/15/18 161 lb (73 kg)   01/12/18 158 lb 12.8 oz (72 kg)     Body mass index is 23.57 kg/(m^2).    PHYSICAL EXAM:  GENERAL: Pleasant, well-appearing patient in no acute distress.   HEENT: Pupils  equal round reactive to light. Sclerae and conjunctivae clear. TMs are clear bilaterally. Oropharynx is clear with moist mucous membranes.   NECK: Supple without lymphadenopathy, no carotid bruits   CARDIOVASCULAR: Heart regular rate and rhythm without murmur normal S1-S2   ABDOMEN: Soft, nontender, nondistended.  No guarding or rebound.  No organomegaly or masses appreciated.  LUNGS: Clear to auscultation bilaterally, good air movement throughout   EXTREMITIES: Warm and well-perfused. Pedal pulses palpable and symmetric bilaterally.  Bilateral lower extremity edema left slightly worse than right.  Chronic venous stasis changes on the lower legs bilaterally.  The left lower leg including the foot and a few inches on the anterior shin with blanching erythema and warm to the touch.  Bilateral toenail changes consistent with onychomycosis, thickened, discolored, splitting.  There are no open lesions or abrasions on the lower extremities or feet bilaterally.  PELVIC: External genitalia is somewhat erythematous, consistent with chronic irritation  NEURO: Alert and oriented. Grossly nonfocal.   PSYCHIATRIC: Presents on time and well groomed. Normal speech and thought content. Full affect. No abnormal movements or behaviors noted.      QUALITY MEASURES:  The following are part of a depression follow up plan for the patient:  mental health care management    ADDITIONAL HISTORY SUMMARIZED (2): Reviewed 2/15/18 cardiology note regarding hypertension.  DECISION TO OBTAIN EXTRA INFORMATION (1): None.   RADIOLOGY TESTS (1): None.  LABS (1): Labs were ordered today.  MEDICINE TESTS (1): None.  INDEPENDENT REVIEW (2 each): None.       The visit lasted a total of 29 minutes face to face with the patient. Over 50% of the time was spent counseling and educating the patient about blurred vision and foot issue.    Irene ANTUNEZ, am scribing for and in the presence of, Dr. Ortiz.    Dr. Angel ANTUNEZ, personally performed the services  described in this documentation, as scribed by Ireen Piña in my presence, and it is both accurate and complete.    MEDICATIONS:  Current Outpatient Prescriptions   Medication Sig Dispense Refill     acetaminophen (TYLENOL) 500 MG tablet Take 500 mg by mouth every 6 (six) hours as needed for pain. Back pain       cholecalciferol, vitamin D3, 1,000 unit tablet Take 1,000 Units by mouth daily.       citalopram (CELEXA) 20 MG tablet Take 1 tablet (20 mg total) by mouth daily. 90 tablet 1     docusate sodium (COLACE) 100 MG capsule Take 1 capsule (100 mg total) by mouth 2 (two) times a day as needed for constipation. 30 capsule 0     levothyroxine (SYNTHROID, LEVOTHROID) 137 MCG tablet Take 137 mcg by mouth daily.       LORazepam (ATIVAN) 0.5 MG tablet Take 0.5 tablets (0.25 mg total) by mouth every 8 (eight) hours as needed (anxiety or insomnia). 30 tablet 0     losartan (COZAAR) 50 MG tablet Take 50 mg by mouth 2 (two) times a day.       melatonin 3 mg Tab tablet Take 1 tablet (3 mg total) by mouth daily with supper. 30 tablet 0     metoprolol tartrate (LOPRESSOR) 25 MG tablet TAKE ONE TABLET BY MOUTH TWICE DAILY 180 tablet 1     multivitamin with minerals (THERA-M) 9 mg iron-400 mcg Tab tablet Take 1 tablet by mouth daily.       omeprazole (PRILOSEC) 20 MG capsule Take 20 mg by mouth 2 (two) times a day before meals.       polyethylene glycol (GLYCOLAX) 17 gram/dose powder Take 17 g by mouth daily. Mix with water 255 g 0     PROPYLENE GLYCOL//PF (SYSTANE, PF, OPHT) Administer 1 drop to both eyes 4 (four) times a day as needed.        spironolactone (ALDACTONE) 25 MG tablet Take 1 tablet (25 mg total) by mouth daily. 30 tablet 12     terbinafine HCl (LAMISIL) 1 % cream Apply to affected skin on feet twice daily for 2 weeks 30 g 1     triamcinolone (KENALOG) 0.1 % cream Apply thin layer to feet up to twice daily as needed 30 g 0     No current facility-administered medications for this visit.        Total data  points: 3

## 2021-06-16 NOTE — PROGRESS NOTES
Spoke with Mehreen today to check in. Discussed BP/med concerns. Low pulse rate, higher diastolic BP. Checking 3xday. Stopped taking ativan for sleep. Sent message to PCP. Helped patient to schedule a physical for 4/3/18.

## 2021-06-16 NOTE — PROGRESS NOTES
ASSESSMENT: Onychauxis, foot pain.    PLAN: Toenails were debrided manually and mechanically x9. Return to clinic in nine weeks.         SUBJECTIVE: The patient returns to the [facility] with toenails that are long, thick and painful.  She has tried to cut the toenails herself over the past several years, but ends up with bleeding and pain. She denies other injury.  Attempts have been made to remove the left first and second toenails, but they have grown back.  The right fourth toenail is permanently absent. The toenails were last debrided in the clinic on December 26, 2017.    OBJECTIVE:  General: Pleasant 78 y.o. female in no acute distress.  Vascular: DP pulses are absent. PT pulses are absent. Pedal hair is absent. Feet are warm to the touch.  Cardiac: Pulse is not palpable.  Lymphatic: No edema.  Neuro: Sensation in the feet is grossly intact to light touch.  Derm: Toenails are elongated, thickened and dystrophic with discoloration and subungual debris.  The right fourth toenail is surgically absent.  Skin is thin and shiny but intact.   Musculoskeletal: Adequate passive range of motion and foot and ankle joints.

## 2021-06-16 NOTE — PROGRESS NOTES
"Cardiology Progress Note    Assessment:  Hypertension,  labile with history of pronounced orthostatic drops; symptomatically improved; negative screening for amyloidosis  Peripheral edema  History of abdominal aortic aneurysm stenting  Aortic stenosis, mild to moderate      Plan:  Increase spironolactone to 25 mg a day  Stop hydrochlorothiazide  Basic metabolic panel in 2 weeks  Follow-up in 3 months    Subjective:   This is 78 y.o. female who comes in today for follow-up visit.  She reports that her systolic blood pressure continues to fluctuate from 170 to 80 .  She feels better when her blood pressure is high but she is worried about getting a stroke.  She gets lightheaded when blood pressure gets low.  She has not passed out.  She has had mild swelling of lower extremities.  Swelling goes away in the morning.    Review of Systems:   General: Weight Gain, WNL  Eyes: Visual Distubance  Ears/Nose/Throat: WNL  Lungs: WNL  Heart: WNL  Stomach: WNL  Bladder: WNL  Muscle/Joints: Joint Pain  Skin: WNL  Nervous System: WNL  Mental Health: WNL     Blood: WNL    Objective:   /60 (Patient Site: Right Arm, Patient Position: Sitting, Cuff Size: Adult Regular)  Pulse 60  Resp 18  Ht 5' 6\" (1.676 m)  Wt 161 lb (73 kg)  BMI 25.99 kg/m2  Physical Exam:  GENERAL: no distress  NECK: No JVD  LUNGS: Clear to auscultation.  CARDIAC: regular rhythm, S1 & S2 normal.  No heaves, thrills, gallops 2/6 systolic ejection murmur at the aortic area  ABDOMEN: flat, negative hepatosplenomegaly, soft and non-tender.  EXTREMITIES: No evidence of cyanosis, clubbing 1+ edema edema.    Current Outpatient Prescriptions   Medication Sig Dispense Refill     acetaminophen (TYLENOL) 500 MG tablet Take 500 mg by mouth every 6 (six) hours as needed for pain. Back pain       cholecalciferol, vitamin D3, 1,000 unit tablet Take 1,000 Units by mouth daily.       citalopram (CELEXA) 20 MG tablet Take 1 tablet (20 mg total) by mouth daily. 90 tablet 0 "     docusate sodium (COLACE) 100 MG capsule Take 1 capsule (100 mg total) by mouth 2 (two) times a day as needed for constipation. 30 capsule 0     levothyroxine (SYNTHROID, LEVOTHROID) 137 MCG tablet Take 137 mcg by mouth daily.       LORazepam (ATIVAN) 0.5 MG tablet Take 0.5 tablets (0.25 mg total) by mouth every 8 (eight) hours as needed (anxiety or insomnia). 30 tablet 0     losartan (COZAAR) 50 MG tablet Take 50 mg by mouth 2 (two) times a day.       melatonin 3 mg Tab tablet Take 1 tablet (3 mg total) by mouth daily with supper. 30 tablet 0     metoprolol tartrate (LOPRESSOR) 25 MG tablet Take 1 tablet (25 mg total) by mouth 2 (two) times a day. 60 tablet 2     multivitamin with minerals (THERA-M) 9 mg iron-400 mcg Tab tablet Take 1 tablet by mouth daily.       omeprazole (PRILOSEC) 20 MG capsule Take 20 mg by mouth 2 (two) times a day before meals.       polyethylene glycol (GLYCOLAX) 17 gram/dose powder Take 17 g by mouth daily. Mix with water 255 g 0     PROPYLENE GLYCOL//PF (SYSTANE, PF, OPHT) Administer 1 drop to both eyes 4 (four) times a day as needed.        terbinafine HCl (LAMISIL) 1 % cream Apply to affected skin on feet twice daily for 2 weeks 30 g 1     triamcinolone (KENALOG) 0.1 % cream Apply thin layer to feet up to twice daily as needed 30 g 0     spironolactone (ALDACTONE) 25 MG tablet Take 1 tablet (25 mg total) by mouth daily. 30 tablet 12     No current facility-administered medications for this visit.        Cardiographics:    ECG: Sinus rhythm LVH voltage      Echocardiogram: May 2017    Left Ventricle: Normal size.The calculated left ventricular ejection fraction is 71%. This represents a normal ejection fraction. Moderate hypertrophy noted. E/e' > 15, suggesting elevated LV filling pressures.    Aortic Valve: The valve is tricuspid. There is mild global calcification with reduced excursion of the aortic valve present. Mild to moderate stenosis.    Texture of myocardium and small  pericardial effusion raises possibility of cardiac amyloidosis      Stress Test: November 2015  1. Lexiscan stress nuclear study is negative for inducible myocardial   ischemia or infarction.     Lab Results:       Lab Results   Component Value Date    CHOL 256 (H) 09/22/2015    CHOL 241 (H) 03/10/2015    CHOL 252 (H) 11/10/2014     Lab Results   Component Value Date    HDL 43 09/22/2015    HDL 43 03/10/2015    HDL 35 (L) 11/10/2014     Lab Results   Component Value Date    LDLCALC 195 (H) 09/22/2015    LDLCALC 168 (H) 03/10/2015    LDLCALC 174 (H) 11/10/2014     Lab Results   Component Value Date    TRIG 91 09/22/2015    TRIG 149 03/10/2015    TRIG 216 (H) 11/10/2014     No components found for: CHOLHDL  BNP   Date Value Ref Range Status   10/25/2015 69 0 - 137 pg/mL Final       Parag (Hector)  MD Adriane

## 2021-06-17 NOTE — PROGRESS NOTES
ASSESSMENT & PLAN:  1. Left foot edema, erythema  X-ray, sed rate, CRP to evaluate for osteomyelitis, but low suspicion for this.  Uric acid to evaluate for gout, but strongly doubt this as well.  Suspect her erythema is related to ongoing tinea pedis, and I suspect she is not using antifungal creams regularly.  I suspect her mild pedal edema is related to venous insufficiency.  I recommend she return to dermatology for further evaluation and management.  She was on a brief course of clindamycin and she states it did not really make any difference and gave her GI side effects.  I do not feel this is consistent with a cellulitis and I would hold off on further oral antibiotics at this time  - XR Foot Left 3 or More VWS; Future  - Uric Acid  - Sedimentation Rate  - C-Reactive Protein(CRP)    2. Chronic left SI joint pain  I suspect that the left lower quadrant pain that brought her into the ER could have been from left SI joint issues.  Today her pain seems very musculoskeletal.  Last spine clinic note from December was reviewed, at that point they wanted to hold off on another SI joint injection, but at this point she would likely be a candidate.  Referral back to spine care.  - Ambulatory referral to Spine Care    Spent a significant amount of time talking to patient and daughter today about her lower extremity edema, or concerns regarding amlodipine which she is no longer on, review of different symptoms and when they started and what they might correlate with.  Also discussed yeast vaginitis, and if she is still having symptoms she could use intravaginal Monistat.  She had just been applying it externally but she did have a positive wet prep recently.    All medications were reviewed and reconciled.    There are no Patient Instructions on file for this visit.    No orders of the defined types were placed in this encounter.    There are no discontinued medications.    No Follow-up on file.    CHIEF  "COMPLAINT:  Chief Complaint   Patient presents with     Follow-up     ER visit 4/7/18 @ [facility] for abdomina and back pain      Eye Problem     pt noes she gets \"fuzzy eyes\" in the morning when she takes her pills        HISTORY OF PRESENT ILLNESS:  Mehreen is a 78 y.o. female presenting to the clinic today for ER follow up for abdominal and back pain. Mehreen was seen at [facility] on 4/7/18. She is accompanied by her daughter. The pain is localized in the lower left area of the abdomen that radiates down into her lower back. She notes several days after being on an antibiotic, she began to experience abdominal cramps and pain. Workup in the ER was unremarkable. She has been seen by a physical therapist before which did not help. She also gets bloated easily; believes she retrains fluid easily. Had been followed by the Spine clinic for injections in her back in the past.    Foot Swelling: She explains that she continues to have swelling and inflammation in both feet. The swelling comes and goes. Her left foot seems to be more warm and red. It seems to be less swollen and painful in the morning and flares up later in the day. Her left ankle puffs up and can become very painful. She was evaluated by dermatology as athletes foot. It seems to flare up when she sits around and when she engages in physical activity. She has questions regarding if this may be related to amlodipine, as this began when she discontinued this. Daughter suspects she did, in fact, have swelling when she was on amlodipine. There seems to be some confusion regarding this. Her daughter adds that it started to get worse in September. She continues to use a topical powder on her feet for athletes foot.    REVIEW OF SYSTEMS:   She has been headaches localized in an area on her right forehead at night. Head CT done in ER was unremarkable. She would like to know if she can use Monistat internally for vaginal itching. All other systems are " negative.    PFSH:  Reviewed as below.    TOBACCO USE:  History   Smoking Status     Former Smoker     Types: Cigarettes     Quit date: 12/11/1994   Smokeless Tobacco     Never Used       VITALS:  Vitals:    04/10/18 1148   BP: 122/76   Patient Site: Left Arm   Patient Position: Sitting   Cuff Size: Adult Regular   Pulse: (!) 58   Resp: 16   Temp: 98  F (36.7  C)   TempSrc: Oral   Weight: 160 lb (72.6 kg)     Wt Readings from Last 3 Encounters:   04/10/18 160 lb (72.6 kg)   04/07/18 148 lb (67.1 kg)   03/23/18 146 lb (66.2 kg)     Body mass index is 25.82 kg/(m^2).    PHYSICAL EXAM:  GENERAL: Pleasant, well-appearing patient in no acute distress.  HEENT: Pupils equal round. Oropharynx is clear with moist mucous membranes.  NECK: Supple without lymphadenopathy, no carotid bruits  CARDIOVASCULAR: Heart regular rate and rhythm without murmur normal S1-S2  ABDOMEN: Soft, nontender, nondistended.  No guarding or rebound.  No organomegaly or masses appreciated.   LUNGS: Clear to auscultation bilaterally, good air movement throughout  EXTREMITIES:Venous stasis changes. Trace edema. Left foot erythema, slight warmth. Fungal nail changes bilaterally. Pedal pulses palpable and symmetric bilaterally.   NEURO: Alert and oriented. Grossly nonfocal.  PSYCHIATRIC: Presents on time and well groomed. Normal speech and thought content. Full affect. No abnormal movements or behaviors noted.  MUSCULOSKELETAL: Left SI joint tenderness.     ADDITIONAL HISTORY SUMMARIZED (2): Reviewed 12/19/17 note regarding back pain.  DECISION TO OBTAIN EXTRA INFORMATION (1): None.   RADIOLOGY TESTS (1): None.  LABS (1): Reviewed 11/24/17 lab, fungal infection. Ordered foot XR today.  MEDICINE TESTS (1): None.  INDEPENDENT REVIEW (2 each): Reviewed foot XR, no acute findings.      The visit lasted a total of 31 minutes face to face with the patient. Over 50% of the time was spent counseling and educating the patient about ER follow up and feet  swelling.    I, Irene Piña, am scribing for and in the presence of, Dr. Ortiz.    I, Dr. Ortiz, personally performed the services described in this documentation, as scribed by Irene Piña in my presence, and it is both accurate and complete.    MEDICATIONS:  Current Outpatient Prescriptions   Medication Sig Dispense Refill     acetaminophen (TYLENOL) 500 MG tablet Take 500 mg by mouth every 6 (six) hours as needed for pain. Back pain       cholecalciferol, vitamin D3, 1,000 unit tablet Take 1,000 Units by mouth daily.       citalopram (CELEXA) 20 MG tablet Take 1 tablet (20 mg total) by mouth daily. 90 tablet 1     clindamycin (CLEOCIN) 150 MG capsule TAKE 2 CAPSULES BY MOUTH THREE TIMES DAILY FOR 5 DAYS 30 capsule 0     docusate sodium (COLACE) 100 MG capsule Take 1 capsule (100 mg total) by mouth 2 (two) times a day as needed for constipation. 30 capsule 0     levothyroxine (SYNTHROID, LEVOTHROID) 137 MCG tablet Take 137 mcg by mouth daily.       losartan (COZAAR) 50 MG tablet Take 50 mg by mouth 2 (two) times a day.       melatonin 3 mg Tab tablet Take 1 tablet (3 mg total) by mouth daily with supper. 30 tablet 0     metoprolol tartrate (LOPRESSOR) 25 MG tablet TAKE ONE TABLET BY MOUTH TWICE DAILY 180 tablet 1     multivitamin with minerals (THERA-M) 9 mg iron-400 mcg Tab tablet Take 1 tablet by mouth daily.       omeprazole (PRILOSEC) 20 MG capsule Take 20 mg by mouth 2 (two) times a day before meals.       polyethylene glycol (GLYCOLAX) 17 gram/dose powder Take 17 g by mouth daily. Mix with water 255 g 0     PROPYLENE GLYCOL//PF (SYSTANE, PF, OPHT) Administer 1 drop to both eyes 4 (four) times a day as needed.        spironolactone (ALDACTONE) 25 MG tablet Take 1 tablet (25 mg total) by mouth daily. 30 tablet 1     terbinafine HCl (LAMISIL) 1 % cream Apply to affected skin on feet twice daily for 2 weeks 30 g 1     triamcinolone (KENALOG) 0.1 % cream Apply thin layer to feet up to twice daily as needed 30  g 0     dicyclomine (BENTYL) 20 mg tablet Take 1 tablet (20 mg total) by mouth 2 (two) times a day. 20 tablet 0     LORazepam (ATIVAN) 0.5 MG tablet Take 0.5 tablets (0.25 mg total) by mouth every 8 (eight) hours as needed (anxiety or insomnia). 30 tablet 0     No current facility-administered medications for this visit.        Total data points: 3

## 2021-06-17 NOTE — PROGRESS NOTES
Will meet with pt and daughter, Leticia, after apt on 4/30 to discuss future needs and possible maintenance. Sending to [facility] RN for chart review.

## 2021-06-17 NOTE — PROGRESS NOTES
Care guide outreach was set for today, patient is in the hospital and will be discharged today to [facility]. Patient is to follow up with PCP in 1-2 weeks. If apt is not made today before discharge I will call pt's daughter to schedule.

## 2021-06-17 NOTE — PROGRESS NOTES
[facility]      Code Status:  DNR/DNI  Visit Type: Review Of Multiple Medical Conditions     Facility:  [facility] [[id number]]           History of Present Illness: Mehreen Camara is a 78 y.o. female who is currently admitted to the TCU as a transfer from the hospital.  As per the history this is a elderly patient who lives in independent living facility with the past medical history significant for severe orthostatic hypotension and chronic back and SI joint pain with venous stasis and AAA status post stent with moderate aortic stenoses ; hypothyroidism;  hyperlipidemia.  She presented with increasing leg weakness for 1 week after undergoing an SI joint steroid injection  She was complaining of increasing bilateral lower extremity leg weakness with some gait instability noted.  Apparently she has had similar issues in the past after getting an SI joint injection.  She had extensive workup done in the hospital and all her lab workups  was normal . ruling out any infection.  Neurology saw her concerned about neurodegenerative disorder like Parkinson's VS MSA..  It was felt that she may not have Parkinson's but instead multisystem atrophy is possible.  They also entertain the idea of it being psychosomatic versus depression contributing to her significant weakness  She continues to have significant back pain and has been discharged on Tylenol along with oxycodone as needed.  Outpatient follow-up with neurosurgery with consideration for follow-up on her MRI findings recommended for her.  She also has severe orthostatic hypotension and remains on metoprolol for blood pressures.  She had some labile blood pressures with hypotension alternating with severe hypertension.  The hospital has recommended conservative drug dosing with as needed medications.  Patient remains quite anxious about her blood pressure.  She has exhibited severe anxiety driven behaviors apparently she was  toileted 8 times in an hour due to repeated urinary frequency she continues to complain of abdominal pain with severe constipation.  She is not reporting diarrhea and wondering if she can discontinue some of her medications  She is upset because her blood sugars have been checked multiple times in a day.  All her blood sugar checks so far have been less than 150  She was seen by psychologist in the hospital and was voicing significant depression anxiety and mood disorder.  She is having UTI.  It seems that she is taking narcotics around the clock.  On questioning it seems she is taking them primarily for which she describes as abdominal pain in the right lower quadrant  No associated nausea or emesis; urinary frequency with the symptoms    Past Medical History:   Diagnosis Date     AAA (abdominal aortic aneurysm)      Acute encephalopathy 9/26/2015     Adrenal adenoma      Harris esophagus      Constipation      Depression      GERD (gastroesophageal reflux disease)      Hiatal hernia      HLD (hyperlipidemia)      HTN (hypertension)      Hypothyroid      Murmur      Osteoarthritis      Retinal Migraine Headache      S/P AAA repair using bifurcation graft 11/30/2015     Scoliosis      Thyroid nodule     removed nodules and thyroid     Past Surgical History:   Procedure Laterality Date     JOINT REPLACEMENT       MO ARTHRODESIS ANT INTERBODY MIN DISCECTOMY, CERVICAL BELOW C2      Description: Cervical Vertebral Fusion;  Recorded: 01/21/2013;  Comments: 1981     MO REVISE MEDIAN N/CARPAL TUNNEL SURG      Description: Neuroplasty Decompression Median Nerve At Carpal Tunnel;  Recorded: 01/21/2013;  Comments: bilateral     MO THYROIDECTOMY      Description: Thyroid Surgery Total Thyroidectomy;  Recorded: 06/08/2011;     MO TOTAL ABDOM HYSTERECTOMY  1985    Description: Hysterectomy;  Recorded: 01/21/2013;     MO TOTAL HIP ARTHROPLASTY Right     Description: Total Hip Replacement;  Recorded: 01/21/2013;  Comments: right,  2003     Family History   Problem Relation Age of Onset     Heart disease Mother      Hypertension Mother      Heart disease Father      Cancer Brother 57     colon     Hypertension Daughter      Hypertension Daughter      Neuropathy Daughter      Lupus Daughter      Heart disease Paternal Aunt      Heart disease Paternal Uncle      Parkinsonism Paternal Uncle      Social History     Social History     Marital status:      Spouse name: N/A     Number of children: N/A     Years of education: N/A     Occupational History     Not on file.     Social History Main Topics     Smoking status: Former Smoker     Types: Cigarettes     Quit date: 12/11/1994     Smokeless tobacco: Never Used     Alcohol use No     Drug use: No     Sexual activity: Not on file     Other Topics Concern     Not on file     Social History Narrative     Current Outpatient Prescriptions   Medication Sig Dispense Refill     acetaminophen (TYLENOL) 500 MG tablet Take 2 tablets (1,000 mg total) by mouth 3 (three) times a day.  0     cholecalciferol, vitamin D3, 1,000 unit tablet Take 1,000 Units by mouth daily.       citalopram (CELEXA) 20 MG tablet Take 20 mg by mouth Daily after lunch.        docusate sodium (COLACE) 100 MG capsule Take 1 capsule (100 mg total) by mouth 2 (two) times a day as needed for constipation. 30 capsule 0     fludrocortisone (FLORINEF) 0.1 mg tablet Take 1 tablet (0.1 mg total) by mouth daily. 30 tablet 0     levothyroxine (SYNTHROID, LEVOTHROID) 137 MCG tablet Take 137 mcg by mouth daily.        losartan (COZAAR) 50 MG tablet Take 50 mg by mouth 2 (two) times a day.        melatonin 3 mg Tab tablet Take 3 mg by mouth at bedtime as needed.        metoprolol tartrate (LOPRESSOR) 25 MG tablet Take 25 mg by mouth 2 (two) times a day.        multivitamin with minerals (THERA-M) 9 mg iron-400 mcg Tab tablet Take 1 tablet by mouth daily.       omeprazole (PRILOSEC) 20 MG capsule Take 20 mg by mouth 2 (two) times a day before  meals.        oxyCODONE (ROXICODONE) 5 MG immediate release tablet Take 1 tablet (5 mg total) by mouth every 4 (four) hours as needed for pain. 20 tablet 0     polyethylene glycol (GLYCOLAX) 17 gram/dose powder Take 17 g by mouth daily. Mix with water 255 g 0     PROPYLENE GLYCOL//PF (SYSTANE, PF, OPHT) Administer 1 drop to both eyes 4 (four) times a day as needed.        spironolactone (ALDACTONE) 25 MG tablet Take 1 tablet (25 mg total) by mouth daily. (Patient taking differently: Take 25 mg by mouth daily. ) 30 tablet 1     triamcinolone (KENALOG) 0.1 % cream Apply thin layer to feet up to twice daily as needed 30 g 0     No current facility-administered medications for this visit.      Allergies   Allergen Reactions     Penicillins Anaphylaxis, Shortness Of Breath and Rash     Aspirin Nausea Only     Atenolol Cough     Atorvastatin Myalgia     Bupropion Nausea Only     Cleocin [Clindamycin Hcl] Other (See Comments)     Constipation      Codeine Nausea Only     Ibuprofen Nausea Only     Stomach upset     Lovastatin Myalgia     Cephalexin Rash     Neck reddness         Review of Systems:    Constitutional: Negative.  Negative for fever, chills,has  activity change, appetite change and fatigue.   HENT: Negative for congestion and facial swelling.    Eyes: Negative for photophobia, redness and visual disturbance.   Respiratory: Negative for cough and chest tightness.    Cardiovascular: Negative for chest pain, palpitations and leg swelling.   Gastrointestinal: Negative for nausea, diarrhea, she has chronic constipation,no blood in stool and abdominal distention.   Complaining of abdominal bloating  She is now complaining of right lower quadrant abdominal pain with cramping and has been taking narcotics frequently due to that  Genitourinary: Negative.  Repeatedly requesting that she be toileted for urinary frequency  Musculoskeletal: Negative.    Skin: Negative.    Neurological: Negative for dizziness,  tremors, syncope, weakness, light-headedness and headaches.   Hematological: Does not bruise/bleed easily.   Psychiatric/Behavioral: Negative.  Remains anxious    Vitals:    05/10/18 1100   BP: 152/85   Pulse: 78   Temp: 98  F (36.7  C)       Physical Exam:    GENERAL: no acute distress. Cooperative in conversation.   HEENT: pupils are equal, round and reactive. Oral mucosa is moist and intact.  RESP:Chest symmetric. Regular respiratory rate. No stridor.  CVS: S1S2; she has an ejection murmur  ABD: Nondistended, soft.  No rebound no guarding but tender to deep palpation in the right lower quadrant  EXTREMITIES: No lower extremity edema.  She has limited abduction in her left shoulder due to an old injury.  Right shoulder has good range of movement limited power and strength noted in the bilateral lower extremities  NEURO: non focal. Alert and oriented x3.   PSYCH: within normal limits. No depression ;has anxiety.  SKIN: warm dry intact     Labs:    Recent Results (from the past 240 hour(s))   Vitamin B12   Result Value Ref Range    Vitamin B-12 920 (H) 213 - 816 pg/mL   Copper, Serum   Result Value Ref Range    Copper, Serum/Plasma 94 80 - 155 ug/dL   Urinalysis   Result Value Ref Range    Color, UA Straw Colorless, Yellow, Straw, Light Yellow    Clarity, UA Cloudy (!) Clear    Glucose, UA Negative Negative    Bilirubin, UA Negative Negative    Ketones, UA Negative Negative    Specific Gravity, UA 1.007 1.001 - 1.030    Blood, UA Negative Negative    pH, UA 7.0 4.5 - 8.0    Protein, UA Negative Negative mg/dL    Urobilinogen, UA <2.0 E.U./dL <2.0 E.U./dL, 2.0 E.U./dL    Nitrite, UA Positive (!) Negative    Leukocytes, UA Large (!) Negative    Bacteria, UA Moderate (!) None Seen hpf    RBC, UA 0-2 None Seen, 0-2 hpf    WBC, UA 25-50 (!) None Seen, 0-5 hpf    Squam Epithel, UA 5-10 (!) None Seen, 0-5 lpf    WBC Clumps Present (!) None Seen   Culture, Urine   Result Value Ref Range    Culture >100,000 col/ml  Escherichia coli (!)        Susceptibility    Escherichia coli - KENZIE     Amoxicillin + Clavulanate 8/4 Sensitive      Ampicillin <=4 Sensitive      Cefazolin 2 Sensitive      Cefepime <=1 Sensitive      Ceftriaxone <=1 Sensitive      Ciprofloxacin <=0.5 Sensitive      Gentamicin <=2 Sensitive      Levofloxacin <=1 Sensitive      Meropenem <=0.25 Sensitive      Nitrofurantoin <=16 Sensitive      Tetracycline <=2 Sensitive      Tobramycin <=2 Sensitive      Trimethoprim + Sulfamethoxazole <=0.5 Sensitive        Assessment/Plan:    Bilateral leg weakness status post epidural steroid injection in her SI joint.  She has a history of repetitive bilateral lower extremity weakness after steroid injection in her SI joints in the past.  Associated with that she had severe low back pain.  Extensive workup done in the hospital to rule out infectious etiology came back normal with normal white count normal CRP normal head CT with no fever.  Neurology suspect she has some neurodegenerative disorder not ruling out Parkinson's welt versus multisystem atrophy.  Discharge for PT OT and rehab with outpatient follow-up with neurology.  Ambulating currently using a walker    Acute back pain.  Discharge with physical therapy Tylenol and oxycodone as needed for management.  If she continues to have symptoms she will do an outpatient follow-up with neurosurgery with repeat imaging including MRI been recommended for her.    Severe orthostatic hypotension which is a long-standing concern.  She has had labile blood pressures in the TCU as well as in the hospital  They have recommended continuing with her medications with minimal adjustment of possible apparently she has had her medications held frequently including metoprolol losartan the hospital due to hypertension  Early discharge and fludrocortisone daily along with metoprolol 25 twice daily and losartan 50 twice daily  As recommended by hospital no changes to blood pressure meds made  today    Hypothyroidism on replacement Synthroid    Mood disorder with significant anxiety being reported by patient with repetitive concerns she is on Celexa does not think it is very effective for her.  Follow-up psychology marce will be ordered for her she was seeing psychology in the hospital.    Chronic constipation /IBS-continue with his stool regimen    GERD on a PPI    History of insomnia on melatonin as needed    Hyperglycemia currently patient has been discharged with orders for blood sugar checks twice a day.  She is very upset about that continue to monitor and if stable will discontinue checks.  All her blood sugars are adequately controlled him to discontinue her Accu-Cheks    Uti-cultures grew more than 100,000 colonies of E. coli which appears to be pansensitive   on Macrodantin    Abdominal pain primarily in the right lower quadrant unclear etiology she has been using narcotics for it my plan is to order an x-ray and an ultrasound of her right lower quadrant and check some labs continue to monitor closely  Also recheck a BMP.  She has recently been treated for UTI also    Debilitation-quite profound she is here for PT OT and rehab requesting a DNR/DNI CODE STATUS  She remains emotionally labile she has been requesting that none of her cardiac meds be adjusted.  She does not want any additional meds added to her regimen so we will respect that.  Walking in PT.  Follow-up on the results of her abdominal ultrasound and x-ray for her ongoing issue with abdominal pain.  Total time spent was 35 minutes, more than half of it was in face-to-face counseling regarding disease state, treatment, side effects, documentation, review of clinical data and coordination of care    Electronically signed by: ANNIKA Oconnor  This progress note was completed using Dragon software and there may be grammatical errors.

## 2021-06-17 NOTE — PROGRESS NOTES
"  [facility]      NAME:  Mehreen Camara             :  1940    MRN: [id number]    CODE STATUS:  DNR/DNI    FACILITY: [facility] [[id number]]         CHIEF COMPLAIN/REASON FOR VISIT:  Chief Complaint   Patient presents with     Problem Visit       HISTORY OF PRESENT ILLNESS: Mehreen Camara is a 78 y.o. female being seen at the request of the nurses for ongoing pain control and B/P elevation. Her B/P today is 179/91 and has been systaltically  running in 180\"s.  Pt lives in [place] and was recently in acute cre for increased weakness and hypotension. PMH includes:  severe orthostatic hypotension and chronic back and SI joint pain with venous stasis and AAA status post stent with moderate aortic stenoses ; hypothyroidism;  hyperlipidemia.  She presented with increasing leg weakness for 1 week after undergoing an SI joint steroid injection, with increasing bilateral lower extremity leg weakness with some gait instability noted.  Apparently she has had similar issues in the past after getting an SI joint injection. She is not a good historian and tends to wander during her ROS and assessment. She is seen in her room, calm and co=operative.    Allergies   Allergen Reactions     Penicillins Anaphylaxis, Shortness Of Breath and Rash     Aspirin Nausea Only     Atenolol Cough     Atorvastatin Myalgia     Bupropion Nausea Only     Cleocin [Clindamycin Hcl] Other (See Comments)     Constipation      Codeine Nausea Only     Ibuprofen Nausea Only     Stomach upset     Lovastatin Myalgia     Cephalexin Rash     Neck reddness   :     Current Outpatient Prescriptions   Medication Sig     acetaminophen (TYLENOL) 500 MG tablet Take 2 tablets (1,000 mg total) by mouth 3 (three) times a day.     cholecalciferol, vitamin D3, 1,000 unit tablet Take 1,000 Units by mouth daily.     citalopram (CELEXA) 20 MG tablet Take 20 mg by mouth Daily after lunch.      docusate sodium (COLACE) 100 MG " "capsule Take 1 capsule (100 mg total) by mouth 2 (two) times a day as needed for constipation.     fludrocortisone (FLORINEF) 0.1 mg tablet Take 1 tablet (0.1 mg total) by mouth daily.     levothyroxine (SYNTHROID, LEVOTHROID) 137 MCG tablet Take 137 mcg by mouth daily.      losartan (COZAAR) 50 MG tablet Take 50 mg by mouth 2 (two) times a day.      melatonin 3 mg Tab tablet Take 3 mg by mouth at bedtime as needed.      metoprolol tartrate (LOPRESSOR) 25 MG tablet Take 25 mg by mouth 2 (two) times a day.      multivitamin with minerals (THERA-M) 9 mg iron-400 mcg Tab tablet Take 1 tablet by mouth daily.     omeprazole (PRILOSEC) 20 MG capsule Take 20 mg by mouth 2 (two) times a day before meals.      oxyCODONE (ROXICODONE) 5 MG immediate release tablet Take 1 tablet (5 mg total) by mouth every 4 (four) hours as needed for pain.     polyethylene glycol (GLYCOLAX) 17 gram/dose powder Take 17 g by mouth daily. Mix with water     PROPYLENE GLYCOL//PF (SYSTANE, PF, OPHT) Administer 1 drop to both eyes 4 (four) times a day as needed.      spironolactone (ALDACTONE) 25 MG tablet Take 1 tablet (25 mg total) by mouth daily. (Patient taking differently: Take 25 mg by mouth daily. )     triamcinolone (KENALOG) 0.1 % cream Apply thin layer to feet up to twice daily as needed         REVIEW OF SYSTEMS:    Currently, no fever, chills, or rigors. Does not have any visual or hearing problems. Denies any chest pain, headaches, palpitations, lightheadedness, dizziness, shortness of breath, or cough. Appetite is good. Denies any GERD symptoms. Denies any difficulty with swallowing, nausea, or vomiting.  Denies any abdominal pain, diarrhea or constipation states she feels \"bloated\"  ( abd assess neg.) . Denies any urinary symptoms. No insomnia. No active bleeding. No rash.       PHYSICAL EXAMINATION:  Vitals:    05/07/18 1251   BP: (!) 179/91   Pulse: (!) 50   Temp: 97.6  F (36.4  C)   Weight: 156 lb 6.4 oz (70.9 kg) "         GENERAL: Awake, Alert, oriented, not in any form of acute distress, answers questions appropriately, follows simple commands but states she feels mixed up a bit, , conversant  HEENT: Head is normocephalic with normal hair distribution. No evidence of trauma. Ears: No acute purulent discharge. Eyes: Conjunctivae pink with no scleral jaundice. Nose: Normal mucosa and septum.    CHEST: No tenderness or deformity, no crepitus  LUNG: Clear to auscultation with good chest expansion. There are no crackles or wheezes, normal AP diameter.  BACK: No kyphosis of the thoracic spine. Symmetric, no curvature, ROM normal, no CVA tenderness, no spinal tenderness   CVS: There is good S1  S2,  rhythm is regular.  ABDOMEN: Globular and soft, nontender to palpation, non distended, no masses, no organomegaly, good bowel sounds, no rebound or guarding, no peritoneal signs.   EXTREMITIES: Atraumatic. Full range of motion on both upper and lower extremities, there is no tenderness to palpation, no pedal edema, no cyanosis or clubbing, no calf tenderness, normal cap refill, no joint swelling.  SKIN: Warm and dry, no erythema noted, no rashes or lesions.  NEUROLOGICAL: The patient is oriented to person, place and time. Strength and sensation are grossly intact. Face is symmetric.                    LABS:    Lab Results   Component Value Date    WBC 7.3 04/30/2018    HGB 14.7 04/30/2018    HCT 43.7 04/30/2018    MCV 93 04/30/2018     04/30/2018       Results for orders placed or performed during the hospital encounter of 04/29/18   Basic Metabolic Panel   Result Value Ref Range    Sodium 137 136 - 145 mmol/L    Potassium 3.9 3.5 - 5.0 mmol/L    Chloride 100 98 - 107 mmol/L    CO2 28 22 - 31 mmol/L    Anion Gap, Calculation 9 5 - 18 mmol/L    Glucose 103 70 - 125 mg/dL    Calcium 8.5 8.5 - 10.5 mg/dL    BUN 9 8 - 28 mg/dL    Creatinine 0.58 (L) 0.60 - 1.10 mg/dL    GFR MDRD Af Amer >60 >60 mL/min/1.73m2    GFR MDRD Non Af Amer  >60 >60 mL/min/1.73m2           Lab Results   Component Value Date    HGBA1C 6.0 12/11/2015     Vitamin D, Total (25-Hydroxy)   Date Value Ref Range Status   04/17/2017 28.1 (L) 30.0 - 80.0 ng/mL Final     Lab Results   Component Value Date    [id number] 920 (H) 05/01/2018       ASSESSMENT/PLAN:  1. Uncontrolled hypertension    2. Anxiety    3. Weakness      Pt continues with elevated B/P, Pulses in the 50's, therefore unable to increase metoporol.  We will add Lisinopril 5 mg po QM to compliment current regime of metoporol and Losartan.     Anxiety, calm this am, we have a consult for psych services, reviewed with SS today.    Continues with weakness, in therapy, is positive for UTI which may contribute to her weakness, start nitrofurantion 100 mg po BID X 7 days.  MCS will follow throughout TCU stay.       Electronically signed by:  Nancy Daly CNP  This progress note was completed using Dragon software and there may be grammatical errors.    35  minutes spent of which greater than 75 % was face to face communication with the patient about above plan of care

## 2021-06-17 NOTE — PROGRESS NOTES
[facility]      Code Status:  DNR/DNI  Visit Type: H & P     Facility:  [facility] [[id number]]           History of Present Illness: Merheen Camara is a 78 y.o. female who is currently admitted to the TCU as a transfer from the hospital.  As per the history this is a elderly patient who lives in independent living facility with the past medical history significant for severe orthostatic hypotension and chronic back and SI joint pain with venous stasis and AAA status post stent with moderate aortic stenoses ; hypothyroidism;  hyperlipidemia.  She presented with increasing leg weakness for 1 week after undergoing an SI joint steroid injection  She was complaining of increasing bilateral lower extremity leg weakness with some gait instability noted.  Apparently she has had similar issues in the past after getting an SI joint injection.  She had extensive workup done in the hospital and all her lab workups  was normal . ruling out any infection.  Neurology saw her concerned about neurodegenerative disorder like Parkinson's VS MSA..  It was felt that she may not have Parkinson's but instead multisystem atrophy is possible.  They also entertain the idea of it being psychosomatic versus depression contributing to her significant weakness  She continues to have significant back pain and has been discharged on Tylenol along with oxycodone as needed.  Outpatient follow-up with neurosurgery with consideration for follow-up on her MRI findings recommended for her.  She also has severe orthostatic hypotension and remains on metoprolol for blood pressures.  She had some labile blood pressures with hypotension alternating with severe hypertension.  The hospital has recommended conservative drug dosing with as needed medications.  Patient remains quite anxious about her blood pressure.  She has exhibited severe anxiety driven behaviors apparently she was toileted 8 times in an hour due  to repeated urinary frequency she continues to complain of abdominal pain with severe constipation.  She is upset because her blood sugars have been checked multiple times in a day.  She was seen by psychologist in the hospital and was voicing significant depression anxiety and mood disorder.    Past Medical History:   Diagnosis Date     AAA (abdominal aortic aneurysm)      Acute encephalopathy 9/26/2015     Adrenal adenoma      Harris esophagus      Constipation      Depression      GERD (gastroesophageal reflux disease)      Hiatal hernia      HLD (hyperlipidemia)      HTN (hypertension)      Hypothyroid      Murmur      Osteoarthritis      Retinal Migraine Headache      S/P AAA repair using bifurcation graft 11/30/2015     Scoliosis      Thyroid nodule     removed nodules and thyroid     Past Surgical History:   Procedure Laterality Date     JOINT REPLACEMENT       MS ARTHRODESIS ANT INTERBODY MIN DISCECTOMY, CERVICAL BELOW C2      Description: Cervical Vertebral Fusion;  Recorded: 01/21/2013;  Comments: 1981     MS REVISE MEDIAN N/CARPAL TUNNEL SURG      Description: Neuroplasty Decompression Median Nerve At Carpal Tunnel;  Recorded: 01/21/2013;  Comments: bilateral     MS THYROIDECTOMY      Description: Thyroid Surgery Total Thyroidectomy;  Recorded: 06/08/2011;     MS TOTAL ABDOM HYSTERECTOMY  1985    Description: Hysterectomy;  Recorded: 01/21/2013;     MS TOTAL HIP ARTHROPLASTY Right     Description: Total Hip Replacement;  Recorded: 01/21/2013;  Comments: right, 2003     Family History   Problem Relation Age of Onset     Heart disease Mother      Hypertension Mother      Heart disease Father      Cancer Brother 57     colon     Hypertension Daughter      Hypertension Daughter      Neuropathy Daughter      Lupus Daughter      Heart disease Paternal Aunt      Heart disease Paternal Uncle      Parkinsonism Paternal Uncle      Social History     Social History     Marital status:      Spouse name: N/A      Number of children: N/A     Years of education: N/A     Occupational History     Not on file.     Social History Main Topics     Smoking status: Former Smoker     Types: Cigarettes     Quit date: 12/11/1994     Smokeless tobacco: Never Used     Alcohol use No     Drug use: No     Sexual activity: Not on file     Other Topics Concern     Not on file     Social History Narrative     Current Outpatient Prescriptions   Medication Sig Dispense Refill     acetaminophen (TYLENOL) 500 MG tablet Take 2 tablets (1,000 mg total) by mouth 3 (three) times a day.  0     cholecalciferol, vitamin D3, 1,000 unit tablet Take 1,000 Units by mouth daily.       citalopram (CELEXA) 20 MG tablet Take 20 mg by mouth Daily after lunch.        docusate sodium (COLACE) 100 MG capsule Take 1 capsule (100 mg total) by mouth 2 (two) times a day as needed for constipation. 30 capsule 0     fludrocortisone (FLORINEF) 0.1 mg tablet Take 1 tablet (0.1 mg total) by mouth daily. 30 tablet 0     levothyroxine (SYNTHROID, LEVOTHROID) 137 MCG tablet Take 137 mcg by mouth daily.        losartan (COZAAR) 50 MG tablet Take 50 mg by mouth 2 (two) times a day.        melatonin 3 mg Tab tablet Take 3 mg by mouth at bedtime as needed.        metoprolol tartrate (LOPRESSOR) 25 MG tablet Take 25 mg by mouth 2 (two) times a day.        multivitamin with minerals (THERA-M) 9 mg iron-400 mcg Tab tablet Take 1 tablet by mouth daily.       omeprazole (PRILOSEC) 20 MG capsule Take 20 mg by mouth 2 (two) times a day before meals.        oxyCODONE (ROXICODONE) 5 MG immediate release tablet Take 1 tablet (5 mg total) by mouth every 4 (four) hours as needed for pain. 20 tablet 0     polyethylene glycol (GLYCOLAX) 17 gram/dose powder Take 17 g by mouth daily. Mix with water 255 g 0     PROPYLENE GLYCOL//PF (SYSTANE, PF, OPHT) Administer 1 drop to both eyes 4 (four) times a day as needed.        spironolactone (ALDACTONE) 25 MG tablet Take 1 tablet (25 mg total)  by mouth daily. (Patient taking differently: Take 25 mg by mouth daily. ) 30 tablet 1     triamcinolone (KENALOG) 0.1 % cream Apply thin layer to feet up to twice daily as needed 30 g 0     No current facility-administered medications for this visit.      Allergies   Allergen Reactions     Penicillins Anaphylaxis, Shortness Of Breath and Rash     Aspirin Nausea Only     Atenolol Cough     Atorvastatin Myalgia     Bupropion Nausea Only     Cleocin [Clindamycin Hcl] Other (See Comments)     Constipation      Codeine Nausea Only     Ibuprofen Nausea Only     Stomach upset     Lovastatin Myalgia     Cephalexin Rash     Neck reddness         Review of Systems:    Constitutional: Negative.  Negative for fever, chills,has  activity change, appetite change and fatigue.   HENT: Negative for congestion and facial swelling.    Eyes: Negative for photophobia, redness and visual disturbance.   Respiratory: Negative for cough and chest tightness.    Cardiovascular: Negative for chest pain, palpitations and leg swelling.   Gastrointestinal: Negative for nausea, diarrhea, she has chronic constipation,no blood in stool and abdominal distention.   Complaining of abdominal bloating  Genitourinary: Negative.  Repeatedly requesting that she be toileted for urinary frequency  Musculoskeletal: Negative.    Skin: Negative.    Neurological: Negative for dizziness, tremors, syncope, weakness, light-headedness and headaches.   Hematological: Does not bruise/bleed easily.   Psychiatric/Behavioral: Negative.  Remains anxious    Vitals:    05/03/18 1106   BP: 170/83   Pulse: 88   Resp: 17   Temp: 98  F (36.7  C)       Physical Exam:    GENERAL: no acute distress. Cooperative in conversation.   HEENT: pupils are equal, round and reactive. Oral mucosa is moist and intact.  RESP:Chest symmetric. Regular respiratory rate. No stridor.  CVS: S1S2; she has an ejection murmur  ABD: Nondistended, soft.  EXTREMITIES: No lower extremity edema.  She has  limited abduction in her left shoulder due to an old injury.  Right shoulder has good range of movement limited power and strength noted in the bilateral lower extremities  NEURO: non focal. Alert and oriented x3.   PSYCH: within normal limits. No depression ;has anxiety.  SKIN: warm dry intact     Labs:    Recent Results (from the past 240 hour(s))   Basic Metabolic Panel   Result Value Ref Range    Sodium 137 136 - 145 mmol/L    Potassium 4.5 3.5 - 5.0 mmol/L    Chloride 97 (L) 98 - 107 mmol/L    CO2 27 22 - 31 mmol/L    Anion Gap, Calculation 13 5 - 18 mmol/L    Glucose 114 70 - 125 mg/dL    Calcium 9.1 8.5 - 10.5 mg/dL    BUN 13 8 - 28 mg/dL    Creatinine 0.67 0.60 - 1.10 mg/dL    GFR MDRD Af Amer >60 >60 mL/min/1.73m2    GFR MDRD Non Af Amer >60 >60 mL/min/1.73m2   Troponin I   Result Value Ref Range    Troponin I 0.02 0.00 - 0.29 ng/mL   HM1 (CBC with Diff)   Result Value Ref Range    WBC 6.6 4.0 - 11.0 thou/uL    RBC 4.77 3.80 - 5.40 mill/uL    Hemoglobin 15.0 12.0 - 16.0 g/dL    Hematocrit 43.6 35.0 - 47.0 %    MCV 91 80 - 100 fL    MCH 31.4 27.0 - 34.0 pg    MCHC 34.4 32.0 - 36.0 g/dL    RDW 12.0 11.0 - 14.5 %    Platelets 223 140 - 440 thou/uL    MPV 9.7 8.5 - 12.5 fL    Neutrophils % 76 (H) 50 - 70 %    Lymphocytes % 17 (L) 20 - 40 %    Monocytes % 6 2 - 10 %    Eosinophils % 0 0 - 6 %    Basophils % 1 0 - 2 %    Neutrophils Absolute 5.0 2.0 - 7.7 thou/uL    Lymphocytes Absolute 1.1 0.8 - 4.4 thou/uL    Monocytes Absolute 0.4 0.0 - 0.9 thou/uL    Eosinophils Absolute 0.0 0.0 - 0.4 thou/uL    Basophils Absolute 0.0 0.0 - 0.2 thou/uL   Thyroid Stimulating Hormone (TSH)   Result Value Ref Range    TSH 3.83 0.30 - 5.00 uIU/mL   Cortisol   Result Value Ref Range    Cortisol 23.6 ug/dL   CK   Result Value Ref Range    CK, Total 89 30 - 190 U/L   ECG 12 lead nursing unit performed   Result Value Ref Range    SYSTOLIC BLOOD PRESSURE  mmHg    DIASTOLIC BLOOD PRESSURE  mmHg    VENTRICULAR RATE 60 BPM    ATRIAL RATE  60 BPM    P-R INTERVAL 146 ms    QRS DURATION 86 ms    Q-T INTERVAL 396 ms    QTC CALCULATION (BEZET) 396 ms    P Axis 74 degrees    R AXIS 31 degrees    T AXIS 45 degrees    MUSE DIAGNOSIS       ** Poor data quality, interpretation may be adversely affected  Normal sinus rhythm  Moderate voltage criteria for LVH, may be normal variant  Cannot rule out Septal infarct (cited on or before 25-SEP-2015)  Abnormal ECG  When compared with ECG of 21-SEP-2017 16:31,  No significant change was found  Confirmed by ZEFERINO MCKINNON MD LOC:[facility] ([id number]) on 4/29/2018 2:05:17 PM     Urinalysis-UC if Indicated   Result Value Ref Range    Color, UA Yellow Colorless, Yellow, Straw, Light Yellow    Clarity, UA Clear Clear    Glucose, UA Negative Negative    Bilirubin, UA Negative Negative    Ketones, UA 40 mg/dL (!) Negative, 60 mg/dL    Specific Gravity, UA 1.015 1.001 - 1.030    Blood, UA Negative Negative    pH, UA 7.0 4.5 - 8.0    Protein, UA Negative Negative mg/dL    Urobilinogen, UA 2.0 E.U./dL <2.0 E.U./dL, 2.0 E.U./dL    Nitrite, UA Negative Negative    Leukocytes, UA Negative Negative   Basic Metabolic Panel   Result Value Ref Range    Sodium 137 136 - 145 mmol/L    Potassium 3.9 3.5 - 5.0 mmol/L    Chloride 100 98 - 107 mmol/L    CO2 28 22 - 31 mmol/L    Anion Gap, Calculation 9 5 - 18 mmol/L    Glucose 103 70 - 125 mg/dL    Calcium 8.5 8.5 - 10.5 mg/dL    BUN 9 8 - 28 mg/dL    Creatinine 0.58 (L) 0.60 - 1.10 mg/dL    GFR MDRD Af Amer >60 >60 mL/min/1.73m2    GFR MDRD Non Af Amer >60 >60 mL/min/1.73m2   HM2(CBC w/o Differential)   Result Value Ref Range    WBC 7.3 4.0 - 11.0 thou/uL    RBC 4.72 3.80 - 5.40 mill/uL    Hemoglobin 14.7 12.0 - 16.0 g/dL    Hematocrit 43.7 35.0 - 47.0 %    MCV 93 80 - 100 fL    MCH 31.1 27.0 - 34.0 pg    MCHC 33.6 32.0 - 36.0 g/dL    RDW 12.0 11.0 - 14.5 %    Platelets 200 140 - 440 thou/uL    MPV 10.0 8.5 - 12.5 fL   C-Reactive Protein(CRP)   Result Value Ref Range    CRP <0.1 0.0 - 0.8 mg/dL    Ferritin   Result Value Ref Range    Ferritin 139 (H) 10 - 130 ng/mL   Magnesium   Result Value Ref Range    Magnesium 1.9 1.8 - 2.6 mg/dL   Vitamin B12   Result Value Ref Range    Vitamin B-12 920 (H) 213 - 816 pg/mL     Xr Chest 2 Views    Result Date: 4/29/2018  XR CHEST 2 VIEWS 4/29/2018 1:57 PM INDICATION: Wheezing and fatigue. COMPARISON: Chest CTA 04/07/2018 FINDINGS: The cardiomediastinal silhouette and pulmonary vascularity are normal. Lungs are hyperinflated with no acute infiltrate. Chronic left basilar fibroatelectasis. No pneumothorax nor pleural effusion. S-shaped curvature of thoracolumbar spine.    Xr Foot Left 3 Or More Vws    Result Date: 4/10/2018  XR FOOT LEFT 3 OR MORE VWS 4/10/2018 12:49 PM INDICATION: Pain in unspecified foot. COMPARISON: None. FINDINGS: Left foot is in normal alignment with no fracture, subluxation or radiographic signs of osteomyelitis. Moderate degenerative changes involving the tibiotalar, subtalar and mid foot articulations and IP joints of the toes. Small plantar and Achilles calcaneal spurs. Mild calcification of the proximal plantar fascia.    Ct Head Without Contrast    Result Date: 4/29/2018  [facility] CT HEAD WO CONTRAST 4/29/2018 5:58 PM INDICATION: TIA or CVA weakness. TECHNIQUE: Head CT without contrast. Dose reduction techniques were used. CONTRAST: None. COMPARISON:  4/7/2018. FINDINGS: No intracranial hemorrhage, extraaxial collection, mass effect or CT evidence of acute infarct.  There is mild scattered low-attenuation within the periventricular and subcortical white matter. The ventricular system, basal cisterns and the cortical sulci are consistent with mild volume loss. The osseous structures are intact. The orbit regions are unremarkable. The mastoid air cells and the middle ear regions are clear. The sinuses are clear. There is trace vascular calcification within the cavernous portions of the internal carotid arteries.     CONCLUSION: 1.  No  discrete mass lesion, hemorrhage or focal area suggestive of acute edema. 2.  Stable age related changes.     Ct Head Without Contrast    Result Date: 4/7/2018  [facility] CT HEAD WO CONTRAST 4/7/2018 4:10 PM INDICATION: headache headache TECHNIQUE: Without IV contrast. Dose reduction techniques were used. CONTRAST: None COMPARISON:  CT head 05/07/2017 FINDINGS: No intracranial hemorrhage, extraaxial collection, mass effect or CT evidence of acute infarct.  Unchanged mild diffuse parenchymal volume loss with ex vacuo ventricular dilatation. Unchanged mild low-density area in the anterior aspect of the right caudate head. Osseous structures are intact. The visualized orbits, paranasal sinuses and mastoid air cells are free of significant disease.     CONCLUSION: 1.  No acute intracranial abnormality.     Mr Cervical Spine Without Contrast    Result Date: 4/30/2018  [facility] MR CERVICAL SPINE WO CONTRAST 4/30/2018 5:43 PM INDICATION: Weakness in all 4 extremities. Evaluate for stenosis. TECHNIQUE: Without IV contrast. CONTRAST: None. COMPARISON: None. FINDINGS: Solid fusion of the disc spaces at C5-C6 and C6-C7. There is some metallic artifact anteriorly at the C5-C6 level likely reflecting some retained fusion hardware. Reversal of usual cervical lordosis centered at C4-C5. Mild anterior spondylolisthesis at C7-T1 x 2 mm. Otherwise normal alignment. Chronic degenerative endplate change at C4-C5 and C7-T1. Otherwise normal marrow signal intensity. Normal appearance of the craniocervical junction and C1-C2. Focus of increased T2 signal within the left hemicord at the C5-C6 level. Otherwise no spinal cord signal abnormality. The visualized intracranial contents are unremarkable. Grossly normal paraspinal soft tissues. The vertebral artery flow voids are preserved. C2-C3: Mild loss of disc height and T2 signal. Mild marginal osteophyte. Moderate to severe right-sided facet arthropathy. No spinal canal  stenosis. Mild right neural foraminal stenosis. No left neural foraminal stenosis. C3-C4: Mild loss of disc height with mild marginal osteophyte. Moderate left facet arthropathy. No spinal canal stenosis. Mild right neural foraminal stenosis. Moderate left neural foraminal stenosis. C4-C5: Severe disc space narrowing with chronic degenerative endplate change and marginal osteophyte with bilateral uncovertebral spurring. Moderate left facet arthropathy. Mild spinal canal stenosis. Mild flattening of the ventral spinal cord. Moderate right neural foraminal stenosis. Severe left neural foraminal stenosis.  C5-C6: Interbody fusion. Focal spur posteriorly on the left. No facet arthropathy. No spinal canal stenosis. No right neural foraminal stenosis. No left neural foraminal stenosis. C6-C7: Interbody fusion. Mild right uncovertebral spur. No facet arthropathy. No spinal canal stenosis. Mild right neural foraminal stenosis. No left neural foraminal stenosis. C7-T1: Moderate disc space narrowing with mild degenerative anterior spondylolisthesis. Severe left facet arthropathy. No spinal canal stenosis. Mild right neural foraminal stenosis. Mild left neural foraminal stenosis.     CONCLUSION: 1.  Solid spinal fusion C5-C6 and C6-C7. 2.  Focal increased T2 signal within the left hemicord at the C5-C6 level, of uncertain chronicity. Correlation with any prior imaging would be helpful. 3.  Marginal osteophyte C4-C5 with uncovertebral spurring causes mild spinal canal stenosis with severe left and moderate right foraminal stenoses. 4.  Degenerative anterior spondylolisthesis with marginal osteophyte at C7-T1 causes mild bilateral foraminal stenoses.    Cta Chest Abdomen Pelvis    Result Date: 4/7/2018  CTA CHEST ABDOMEN PELVIS 4/7/2018 4:11 PM INDICATION: Abdominal and back pain. History of hypertension and abdominal aortic aneurysm.. Evaluate for aortic dissection. TECHNIQUE: Multiphase helical acquisition through the chest,  abdomen, and pelvis was performed including noncontrast, arterial phase, and delayed images before and after the administration of IV contrast. 2D and 3D reconstructions were performed by the CT technologist. Dose reduction techniques were used. IV CONTRAST: Iohexol (Omni) 100 mL COMPARISON: 10/03/2016 FINDINGS: CT ANGIOGRAM CHEST, ABDOMEN, AND PELVIS: Normal caliber thoracic aorta. Patent great vessels in the mediastinum. Central pulmonary arteries are unremarkable. Moderate diffuse atheromatous calcification. Distal abdominal aortic aneurysm treated with a bifurcated stent graft. Mesenteric and renal arteries are widely patent. CHEST: LUNGS AND PLEURA: Strands of linear atelectasis or fibrosis in each lung. Lungs are otherwise clear. No pleural effusion. MEDIASTINUM: No lymphadenopathy. Moderate coronary artery calcification. ABDOMEN: A left adrenal nodule measures 4 Hounsfield units, consistent with a benign adenoma. This 2.6 cm nodule is unchanged from previous. Normal liver, spleen, gallbladder, right adrenal gland, and kidneys. Probable pancreatic divisum again noted. No signs of pancreatitis. PELVIS: Visualization of the pelvis is limited by streak artifact from a right hip arthroplasty. No free fluid or inflammatory changes. MUSCULOSKELETAL: Right hip arthroplasty. Degenerative changes throughout the spine.     CONCLUSION: 1.  No aortic dissection. No specific finding to explain the patient's pain. No change from previous.    Assessment/Plan:    Bilateral leg weakness status post epidural steroid injection in her SI joint.  She has a history of repetitive bilateral lower extremity weakness after steroid injection in her SI joints in the past.  Associated with that she had severe low back pain.  Extensive workup done in the hospital to rule out infectious etiology came back normal with normal white count normal CRP normal head CT with no fever.  Neurology suspect she has some neurodegenerative disorder not  ruling out Parkinson's welt versus multisystem atrophy.  Discharge for PT OT and rehab with outpatient follow-up with neurology.    Acute back pain.  Discharge with physical therapy Tylenol and oxycodone as needed for management.  If she continues to have symptoms she will do an outpatient follow-up with neurosurgery with repeat imaging including MRI been recommended for her.    Severe orthostatic hypotension which is a long-standing concern.  She has had labile blood pressures in the TCU as well as in the hospital  They have recommended continuing with her medications with minimal adjustment of possible apparently she has had her medications held frequently including metoprolol losartan the hospital due to hypertension  Early discharge and fludrocortisone daily along with metoprolol 25 twice daily and losartan 50 twice daily    Hypothyroidism on replacement Synthroid    Mood disorder with significant anxiety being reported by patient with repetitive concerns she is on Celexa does not think it is very effective for her.  Follow-up psychology eval will be ordered for her she was seeing psychology in the hospital.    Chronic constipation /IBS-continue with her Colace and MiraLAX and monitor she is complaining of a lot of abdominal bloating  Plan is to schedule some senna twice a day for her    GERD on a PPI    History of insomnia on melatonin as needed    Hyperglycemia currently patient has been discharged with orders for blood sugar checks twice a day.  She is very upset about that continue to monitor and if stable will discontinue checks    Urinary frequency plan is to check a UA UC on her    Debilitation-quite profound she is here for PT OT and rehab requesting a DNR/DNI CODE STATUS  Total time spent was 45 minutes, more than half of it was in face-to-face counseling regarding disease state, treatment, side effects, documentation, review of clinical data and coordination of care    Electronically signed by: Ne  ANNIKA Quintana  This progress note was completed using Dragon software and there may be grammatical errors.

## 2021-06-17 NOTE — PROGRESS NOTES
Assessment:   Mehreen Camara is a 78 y.o. y.o. female with past medical history significant for hypertension, hyperlipidemia, hypothyroidism, GERD, depression who presents today for follow-up regarding a one-month flareup of chronic left-sided low back/upper buttock pain, along with left groin pain.  I feel that pain is due to sacroiliac joint dysfunction.  The patient reproduction of her pain with SI joint provocative maneuvers ×2.  She also significant tenderness palpation of the left sacroiliac joint.  Her groin pain may also be due, in part, to left hip osteoarthritis.  The patient status post a left sacral iliac joint injection on November 6, 2017 which provided 60% relief of her pain lasting several weeks.  The patient was in the emergency department on April 7, 2018 and the CT chest abdomen and pelvis show degenerative changes throughout the spine but no change from previous.     Plan:     A shared decision making plan was used.  The patient's values and choices were respected.  The following represents what was discussed and decided upon by the physician assistant and the patient.      1.  DIAGNOSTIC TESTS: I reviewed the CTA chest abdomen pelvis from April 7.  No further diagnostic tests were ordered.    2.  PHYSICAL THERAPY: No further physical therapy was ordered.  The patient participated in physical therapy in 2017.  She did not feel it was beneficial.    3.  MEDICATIONS: No changes are made to the patient's medications.  She can continue using Tylenol as needed.    4.  INTERVENTIONS: I think the patient will likely benefit from  a repeat left sacroiliac joint injection.  The patient has been seeing her doctor regarding left foot erythema.  I would like to check with the patient's primary care provider to make sure that she is medically cleared to have the injection.  The patient was previously prescribed antibiotics for this but did not tolerate it due to GI side effects.  Recently she had blood  work which showed a very mildly elevated ESR, but a normal CRP.  No further antibiotics were prescribed.    5.  PATIENT EDUCATION: The patient is in agreement with the above plan.  All questions were answered.    6.  FOLLOW-UP: A nurse will call the patient was I have heard back from Dr. Ortiz.  At that time I will likely order a left sacral iliac joint injection.  If she has any questions or concerns in the meantime, she should not hesitate to contact our clinic.    Subjective:     Mehreen Camara is a 78 y.o. female who presents today for follow-up regarding a one-month history of increased left low back, upper buttock, hip pain.  The patient status post a left sacroiliac joint injection on November 6, 2017.  Patient reports that provided 60% relief of her pain lasting several weeks.  The patient states that about 1 month ago, her pain became more severe.  She denies any injury or event to cause the pain.  No recent falls.  The patient states that her pain is different from her previous pain and that she now also has left groin pain.    The patient complains of pain in the left lower back/upper buttock.  The pain extends into the lateral hip and wraps around into the left groin.  She states that she occasionally feels some pain extending into the proximal anterior left thigh as well.  The patient rates her pain today as a 6 out of 10.  At its best it is a 2 out of 10.  At its worst it is an 8 out of 10.  The patient's pain is aggravated with walking, sitting, and bending.  It is alleviated with lying down.  Applying heat and ice also provided temporary relief of her pain.  The patient states that her pain was so severe that she went to the emergency department on April 7.  A CTA chest abdomen and pelvis was obtained which did not show any etiology for the patient's pain.  She was discharged home.  Patient has numbness and tingling in the left foot.  She feels generally weak, but denies focal weakness.    The  patient to physical therapy for her lower back in 2017.  She did not feel was helpful.  She is using Tylenol as needed.    Past medical history is reviewed and is pertinent for the ER visit and also the left foot erythema.  She was prescribed clindamycin but did not tolerate it due to GI side effects.  She followed up with her doctor earlier this week for this problem and labs were obtained.      Family history is reviewed and is unchanged in the interim.    Review of Systems:  Positive for numbness and tingling.  Negative for loss of bowel/bladder control, footdrop, weakness, headache, dizziness, nausea/vomiting, blurry vision, balance changes.     Objective:   CONSTITUTIONAL:  Vital signs as above.  No acute distress.  The patient is well nourished and well groomed.    PSYCHIATRIC:  The patient is awake, alert, oriented to person, place and time.  The patient is answering questions appropriately with clear speech.  Normal affect.  HEENT: Normocephalic, atraumatic.  Sclera clear.    SKIN:   the patient does have some erythema over the left dorsal foot.  MUSCULOSKELETAL:  Gait is antalgic, favoring the left.  Significant tenderness palpation of the left sacroiliac joint.  Moderate tenderness palpation of the left greater trochanter.      The patient has 5/5 strength for the bilateral hip flexors, knee flexors/extensors, ankle dorsiflexors/plantar flexors, ankle evertors/invertors.  The patient has a significant pain transitioning from a seated to lying position on the exam table.  Pelvic distraction causes left groin pain, but no back pain.  Positive thigh thrust on the left.  Positive Luther's test on the left.  Internal and external rotation are both mildly to moderately restricted.  NEUROLOGICAL:   Sensation to light touch is intact in the bilateral L4, L5, and S1 dermatomes.       RESULTS:   I reviewed the MRI lumbar spine from March 7, 2017.  This shows moderate multilevel degenerative disc and facet disease  superimposed on a 35  lumbar levoscoliosis.  The patient has moderate left lateral recess stenosis at L4-5 as well as moderate foraminal stenosis on the left at L4-5 with less significant lateral recess and foraminal stenosis elsewhere.  There are left lateral osteophytes at L3-4, L4-5, and L5-S1 which may contact the nerve roots after they have exited the neural foramina.  The patient has facet arthropathy which is mild from T12-L1 through L2-3, moderate at L3-4 and L4-5, and mild to moderate at L5-S1.  Please see report for further details.

## 2021-06-17 NOTE — PROGRESS NOTES
[facility]      Code Status:  DNR/DNI  Visit Type: Review Of Multiple Medical Conditions     Facility:  [facility] [[id number]]           History of Present Illness: Mehreen Camara is a 78 y.o. female who is currently admitted to the TCU as a transfer from the hospital.  As per the history this is a elderly patient who lives in independent living facility with the past medical history significant for severe orthostatic hypotension and chronic back and SI joint pain with venous stasis and AAA status post stent with moderate aortic stenoses ; hypothyroidism;  hyperlipidemia.  She presented with increasing leg weakness for 1 week after undergoing an SI joint steroid injection  She was complaining of increasing bilateral lower extremity leg weakness with some gait instability noted.  Apparently she has had similar issues in the past after getting an SI joint injection.  She had extensive workup done in the hospital and all her lab workups  was normal . ruling out any infection.  Neurology saw her concerned about neurodegenerative disorder like Parkinson's VS MSA..  It was felt that she may not have Parkinson's but instead multisystem atrophy is possible.  They also entertain the idea of it being psychosomatic versus depression contributing to her significant weakness  She continues to have significant back pain and has been discharged on Tylenol along with oxycodone as needed.  Outpatient follow-up with neurosurgery with consideration for follow-up on her MRI findings recommended for her.  She also has severe orthostatic hypotension and remains on metoprolol for blood pressures.  She had some labile blood pressures with hypotension alternating with severe hypertension.  The hospital has recommended conservative drug dosing with as needed medications.  Patient remains quite anxious about her blood pressure.  She has exhibited severe anxiety driven behaviors apparently she was  toileted 8 times in an hour due to repeated urinary frequency she continues to complain of abdominal pain with severe constipation.  She is not reporting diarrhea and wondering if she can discontinue some of her medications  She is upset because her blood sugars have been checked multiple times in a day.  She was seen by psychologist in the hospital and was voicing significant depression anxiety and mood disorder.  She is having UTI.    Past Medical History:   Diagnosis Date     AAA (abdominal aortic aneurysm)      Acute encephalopathy 9/26/2015     Adrenal adenoma      Harris esophagus      Constipation      Depression      GERD (gastroesophageal reflux disease)      Hiatal hernia      HLD (hyperlipidemia)      HTN (hypertension)      Hypothyroid      Murmur      Osteoarthritis      Retinal Migraine Headache      S/P AAA repair using bifurcation graft 11/30/2015     Scoliosis      Thyroid nodule     removed nodules and thyroid     Past Surgical History:   Procedure Laterality Date     JOINT REPLACEMENT       IL ARTHRODESIS ANT INTERBODY MIN DISCECTOMY, CERVICAL BELOW C2      Description: Cervical Vertebral Fusion;  Recorded: 01/21/2013;  Comments: 1981     IL REVISE MEDIAN N/CARPAL TUNNEL SURG      Description: Neuroplasty Decompression Median Nerve At Carpal Tunnel;  Recorded: 01/21/2013;  Comments: bilateral     IL THYROIDECTOMY      Description: Thyroid Surgery Total Thyroidectomy;  Recorded: 06/08/2011;     IL TOTAL ABDOM HYSTERECTOMY  1985    Description: Hysterectomy;  Recorded: 01/21/2013;     IL TOTAL HIP ARTHROPLASTY Right     Description: Total Hip Replacement;  Recorded: 01/21/2013;  Comments: right, 2003     Family History   Problem Relation Age of Onset     Heart disease Mother      Hypertension Mother      Heart disease Father      Cancer Brother 57     colon     Hypertension Daughter      Hypertension Daughter      Neuropathy Daughter      Lupus Daughter      Heart disease Paternal Aunt      Heart  disease Paternal Uncle      Parkinsonism Paternal Uncle      Social History     Social History     Marital status:      Spouse name: N/A     Number of children: N/A     Years of education: N/A     Occupational History     Not on file.     Social History Main Topics     Smoking status: Former Smoker     Types: Cigarettes     Quit date: 12/11/1994     Smokeless tobacco: Never Used     Alcohol use No     Drug use: No     Sexual activity: Not on file     Other Topics Concern     Not on file     Social History Narrative     Current Outpatient Prescriptions   Medication Sig Dispense Refill     acetaminophen (TYLENOL) 500 MG tablet Take 2 tablets (1,000 mg total) by mouth 3 (three) times a day.  0     cholecalciferol, vitamin D3, 1,000 unit tablet Take 1,000 Units by mouth daily.       citalopram (CELEXA) 20 MG tablet Take 20 mg by mouth Daily after lunch.        docusate sodium (COLACE) 100 MG capsule Take 1 capsule (100 mg total) by mouth 2 (two) times a day as needed for constipation. 30 capsule 0     fludrocortisone (FLORINEF) 0.1 mg tablet Take 1 tablet (0.1 mg total) by mouth daily. 30 tablet 0     levothyroxine (SYNTHROID, LEVOTHROID) 137 MCG tablet Take 137 mcg by mouth daily.        losartan (COZAAR) 50 MG tablet Take 50 mg by mouth 2 (two) times a day.        melatonin 3 mg Tab tablet Take 3 mg by mouth at bedtime as needed.        metoprolol tartrate (LOPRESSOR) 25 MG tablet Take 25 mg by mouth 2 (two) times a day.        multivitamin with minerals (THERA-M) 9 mg iron-400 mcg Tab tablet Take 1 tablet by mouth daily.       omeprazole (PRILOSEC) 20 MG capsule Take 20 mg by mouth 2 (two) times a day before meals.        oxyCODONE (ROXICODONE) 5 MG immediate release tablet Take 1 tablet (5 mg total) by mouth every 4 (four) hours as needed for pain. 20 tablet 0     polyethylene glycol (GLYCOLAX) 17 gram/dose powder Take 17 g by mouth daily. Mix with water 255 g 0     PROPYLENE GLYCOL//PF (SYSTANE, PF,  OPHT) Administer 1 drop to both eyes 4 (four) times a day as needed.        spironolactone (ALDACTONE) 25 MG tablet Take 1 tablet (25 mg total) by mouth daily. (Patient taking differently: Take 25 mg by mouth daily. ) 30 tablet 1     triamcinolone (KENALOG) 0.1 % cream Apply thin layer to feet up to twice daily as needed 30 g 0     No current facility-administered medications for this visit.      Allergies   Allergen Reactions     Penicillins Anaphylaxis, Shortness Of Breath and Rash     Aspirin Nausea Only     Atenolol Cough     Atorvastatin Myalgia     Bupropion Nausea Only     Cleocin [Clindamycin Hcl] Other (See Comments)     Constipation      Codeine Nausea Only     Ibuprofen Nausea Only     Stomach upset     Lovastatin Myalgia     Cephalexin Rash     Neck reddness         Review of Systems:    Constitutional: Negative.  Negative for fever, chills,has  activity change, appetite change and fatigue.   HENT: Negative for congestion and facial swelling.    Eyes: Negative for photophobia, redness and visual disturbance.   Respiratory: Negative for cough and chest tightness.    Cardiovascular: Negative for chest pain, palpitations and leg swelling.   Gastrointestinal: Negative for nausea, diarrhea, she has chronic constipation,no blood in stool and abdominal distention.   Complaining of abdominal bloating  Genitourinary: Negative.  Repeatedly requesting that she be toileted for urinary frequency  Musculoskeletal: Negative.    Skin: Negative.    Neurological: Negative for dizziness, tremors, syncope, weakness, light-headedness and headaches.   Hematological: Does not bruise/bleed easily.   Psychiatric/Behavioral: Negative.  Remains anxious    There were no vitals filed for this visit.    Physical Exam:    GENERAL: no acute distress. Cooperative in conversation.   HEENT: pupils are equal, round and reactive. Oral mucosa is moist and intact.  RESP:Chest symmetric. Regular respiratory rate. No stridor.  CVS: S1S2; she  has an ejection murmur  ABD: Nondistended, soft.  EXTREMITIES: No lower extremity edema.  She has limited abduction in her left shoulder due to an old injury.  Right shoulder has good range of movement limited power and strength noted in the bilateral lower extremities  NEURO: non focal. Alert and oriented x3.   PSYCH: within normal limits. No depression ;has anxiety.  SKIN: warm dry intact     Labs:    Recent Results (from the past 240 hour(s))   Basic Metabolic Panel   Result Value Ref Range    Sodium 137 136 - 145 mmol/L    Potassium 4.5 3.5 - 5.0 mmol/L    Chloride 97 (L) 98 - 107 mmol/L    CO2 27 22 - 31 mmol/L    Anion Gap, Calculation 13 5 - 18 mmol/L    Glucose 114 70 - 125 mg/dL    Calcium 9.1 8.5 - 10.5 mg/dL    BUN 13 8 - 28 mg/dL    Creatinine 0.67 0.60 - 1.10 mg/dL    GFR MDRD Af Amer >60 >60 mL/min/1.73m2    GFR MDRD Non Af Amer >60 >60 mL/min/1.73m2   Troponin I   Result Value Ref Range    Troponin I 0.02 0.00 - 0.29 ng/mL   HM1 (CBC with Diff)   Result Value Ref Range    WBC 6.6 4.0 - 11.0 thou/uL    RBC 4.77 3.80 - 5.40 mill/uL    Hemoglobin 15.0 12.0 - 16.0 g/dL    Hematocrit 43.6 35.0 - 47.0 %    MCV 91 80 - 100 fL    MCH 31.4 27.0 - 34.0 pg    MCHC 34.4 32.0 - 36.0 g/dL    RDW 12.0 11.0 - 14.5 %    Platelets 223 140 - 440 thou/uL    MPV 9.7 8.5 - 12.5 fL    Neutrophils % 76 (H) 50 - 70 %    Lymphocytes % 17 (L) 20 - 40 %    Monocytes % 6 2 - 10 %    Eosinophils % 0 0 - 6 %    Basophils % 1 0 - 2 %    Neutrophils Absolute 5.0 2.0 - 7.7 thou/uL    Lymphocytes Absolute 1.1 0.8 - 4.4 thou/uL    Monocytes Absolute 0.4 0.0 - 0.9 thou/uL    Eosinophils Absolute 0.0 0.0 - 0.4 thou/uL    Basophils Absolute 0.0 0.0 - 0.2 thou/uL   Thyroid Stimulating Hormone (TSH)   Result Value Ref Range    TSH 3.83 0.30 - 5.00 uIU/mL   Cortisol   Result Value Ref Range    Cortisol 23.6 ug/dL   CK   Result Value Ref Range    CK, Total 89 30 - 190 U/L   ECG 12 lead nursing unit performed   Result Value Ref Range     SYSTOLIC BLOOD PRESSURE  mmHg    DIASTOLIC BLOOD PRESSURE  mmHg    VENTRICULAR RATE 60 BPM    ATRIAL RATE 60 BPM    P-R INTERVAL 146 ms    QRS DURATION 86 ms    Q-T INTERVAL 396 ms    QTC CALCULATION (BEZET) 396 ms    P Axis 74 degrees    R AXIS 31 degrees    T AXIS 45 degrees    MUSE DIAGNOSIS       ** Poor data quality, interpretation may be adversely affected  Normal sinus rhythm  Moderate voltage criteria for LVH, may be normal variant  Cannot rule out Septal infarct (cited on or before 25-SEP-2015)  Abnormal ECG  When compared with ECG of 21-SEP-2017 16:31,  No significant change was found  Confirmed by ZEFERINO MCKINNON MD LOC:[facility] ([id number]) on 4/29/2018 2:05:17 PM     Urinalysis-UC if Indicated   Result Value Ref Range    Color, UA Yellow Colorless, Yellow, Straw, Light Yellow    Clarity, UA Clear Clear    Glucose, UA Negative Negative    Bilirubin, UA Negative Negative    Ketones, UA 40 mg/dL (!) Negative, 60 mg/dL    Specific Gravity, UA 1.015 1.001 - 1.030    Blood, UA Negative Negative    pH, UA 7.0 4.5 - 8.0    Protein, UA Negative Negative mg/dL    Urobilinogen, UA 2.0 E.U./dL <2.0 E.U./dL, 2.0 E.U./dL    Nitrite, UA Negative Negative    Leukocytes, UA Negative Negative   Basic Metabolic Panel   Result Value Ref Range    Sodium 137 136 - 145 mmol/L    Potassium 3.9 3.5 - 5.0 mmol/L    Chloride 100 98 - 107 mmol/L    CO2 28 22 - 31 mmol/L    Anion Gap, Calculation 9 5 - 18 mmol/L    Glucose 103 70 - 125 mg/dL    Calcium 8.5 8.5 - 10.5 mg/dL    BUN 9 8 - 28 mg/dL    Creatinine 0.58 (L) 0.60 - 1.10 mg/dL    GFR MDRD Af Amer >60 >60 mL/min/1.73m2    GFR MDRD Non Af Amer >60 >60 mL/min/1.73m2   HM2(CBC w/o Differential)   Result Value Ref Range    WBC 7.3 4.0 - 11.0 thou/uL    RBC 4.72 3.80 - 5.40 mill/uL    Hemoglobin 14.7 12.0 - 16.0 g/dL    Hematocrit 43.7 35.0 - 47.0 %    MCV 93 80 - 100 fL    MCH 31.1 27.0 - 34.0 pg    MCHC 33.6 32.0 - 36.0 g/dL    RDW 12.0 11.0 - 14.5 %    Platelets 200 140 - 440 thou/uL    MPV  10.0 8.5 - 12.5 fL   C-Reactive Protein(CRP)   Result Value Ref Range    CRP <0.1 0.0 - 0.8 mg/dL   Ferritin   Result Value Ref Range    Ferritin 139 (H) 10 - 130 ng/mL   Magnesium   Result Value Ref Range    Magnesium 1.9 1.8 - 2.6 mg/dL   Vitamin B12   Result Value Ref Range    Vitamin B-12 920 (H) 213 - 816 pg/mL   Copper, Serum   Result Value Ref Range    Copper, Serum/Plasma 94 80 - 155 ug/dL   Urinalysis   Result Value Ref Range    Color, UA Straw Colorless, Yellow, Straw, Light Yellow    Clarity, UA Cloudy (!) Clear    Glucose, UA Negative Negative    Bilirubin, UA Negative Negative    Ketones, UA Negative Negative    Specific Gravity, UA 1.007 1.001 - 1.030    Blood, UA Negative Negative    pH, UA 7.0 4.5 - 8.0    Protein, UA Negative Negative mg/dL    Urobilinogen, UA <2.0 E.U./dL <2.0 E.U./dL, 2.0 E.U./dL    Nitrite, UA Positive (!) Negative    Leukocytes, UA Large (!) Negative    Bacteria, UA Moderate (!) None Seen hpf    RBC, UA 0-2 None Seen, 0-2 hpf    WBC, UA 25-50 (!) None Seen, 0-5 hpf    Squam Epithel, UA 5-10 (!) None Seen, 0-5 lpf    WBC Clumps Present (!) None Seen   Culture, Urine   Result Value Ref Range    Culture >100,000 col/ml Escherichia coli (!)        Susceptibility    Escherichia coli - KENZIE     Amoxicillin + Clavulanate 8/4 Sensitive      Ampicillin <=4 Sensitive      Cefazolin 2 Sensitive      Cefepime <=1 Sensitive      Ceftriaxone <=1 Sensitive      Ciprofloxacin <=0.5 Sensitive      Gentamicin <=2 Sensitive      Levofloxacin <=1 Sensitive      Meropenem <=0.25 Sensitive      Nitrofurantoin <=16 Sensitive      Tetracycline <=2 Sensitive      Tobramycin <=2 Sensitive      Trimethoprim + Sulfamethoxazole <=0.5 Sensitive        Assessment/Plan:    Bilateral leg weakness status post epidural steroid injection in her SI joint.  She has a history of repetitive bilateral lower extremity weakness after steroid injection in her SI joints in the past.  Associated with that she had severe low  back pain.  Extensive workup done in the hospital to rule out infectious etiology came back normal with normal white count normal CRP normal head CT with no fever.  Neurology suspect she has some neurodegenerative disorder not ruling out Parkinson's welt versus multisystem atrophy.  Discharge for PT OT and rehab with outpatient follow-up with neurology.    Acute back pain.  Discharge with physical therapy Tylenol and oxycodone as needed for management.  If she continues to have symptoms she will do an outpatient follow-up with neurosurgery with repeat imaging including MRI been recommended for her.    Severe orthostatic hypotension which is a long-standing concern.  She has had labile blood pressures in the TCU as well as in the hospital  They have recommended continuing with her medications with minimal adjustment of possible apparently she has had her medications held frequently including metoprolol losartan the hospital due to hypertension  Early discharge and fludrocortisone daily along with metoprolol 25 twice daily and losartan 50 twice daily  Lisinopril added to her regimen due to elevated blood pressures plan is to discontinue that since she is already getting an ARB as losartan    Hypothyroidism on replacement Synthroid    Mood disorder with significant anxiety being reported by patient with repetitive concerns she is on Celexa does not think it is very effective for her.  Follow-up psychology eval will be ordered for her she was seeing psychology in the hospital.    Chronic constipation /IBS-continue with her Colace and MiraLAX and monitor she is complaining of a lot of abdominal bloating  Plan is to schedule some senna twice a day for her.  Now reporting diarrhea so MiraLAX has been discontinued to prn    GERD on a PPI    History of insomnia on melatonin as needed    Hyperglycemia currently patient has been discharged with orders for blood sugar checks twice a day.  She is very upset about that continue  to monitor and if stable will discontinue checks    Uti-cultures grew more than 100,000 colonies of E. coli which appears to be pansensitive  Start her on Macrodantin    Debilitation-quite profound she is here for PT OT and rehab requesting a DNR/DNI CODE STATUS  She remains emotionally labile she has been requesting that none of her cardiac meds be adjusted.  She does not want any additional meds added to her regimen so we will respect that.  Walking in PT  Total time spent was 35 minutes, more than half of it was in face-to-face counseling regarding disease state, treatment, side effects, documentation, review of clinical data and coordination of care    Electronically signed by: ANNIKA Oconnor  This progress note was completed using Dragon software and there may be grammatical errors.

## 2021-06-18 NOTE — PROGRESS NOTES
[facility]    Facility:   [facility] [[id number]]   Code Status: DNR/DNI  PCP: Danni Ortiz MD   Phone: [phone]   Fax: [phone]      CHIEF COMPLAINT/REASON FOR VISIT:  Chief Complaint   Patient presents with     Discharge Summary       HISTORY COURSE:  Mehreen Camara is a 78 y.o. female who was admitted to the TCU as a transfer from the hospital.  As per the history this is a elderly patient who lives in independent living facility with the past medical history significant for severe orthostatic hypotension and chronic back and SI joint pain with venous stasis and AAA status post stent with moderate aortic stenoses ; hypothyroidism;  hyperlipidemia.  She presented with increasing leg weakness for 1 week after undergoing an SI joint steroid injection  She was complaining of increasing bilateral lower extremity leg weakness with some gait instability noted.  Apparently she has had similar issues in the past after getting an SI joint injection.  She had extensive workup done in the hospital and all her lab workups  was normal . ruling out any infection.  Neurology saw her concerned about neurodegenerative disorder like Parkinson's VS MSA..  It was felt that she may not have Parkinson's but instead multisystem atrophy is possible.  They also entertain the idea of it being psychosomatic versus depression contributing to her significant weakness  She continues to have significant back pain and has been discharged on Tylenol along with oxycodone as needed.  Outpatient follow-up with neurosurgery with consideration for follow-up on her MRI findings recommended for her.  She also has severe orthostatic hypotension and remains on metoprolol for blood pressures.  She had some labile blood pressures with hypotension alternating with severe hypertension.  The hospital has recommended conservative drug dosing with as needed medications.  Patient remains quite anxious  about her blood pressure.  She has exhibited severe anxiety driven behaviors apparently she was toileted 8 times in an hour due to repeated urinary frequency she continues to complain of abdominal pain with severe constipation.  She is not reporting diarrhea and wondering if she can discontinue some of her medications  She is upset because her blood sugars have been checked multiple times in a day.  All her blood sugar checks so far have been less than 150  She was seen by psychologist in the hospital and was voicing significant depression anxiety and mood disorder.  It seems that she is taking narcotics around the clock.  On questioning it seems she is taking them primarily for which she describes as abdominal pain in the right lower quadrant and now reporting more pain in her back also  Abdominal x-ray was negative.  Seen by cardiology and taken off fludrocortisone as it is felt that it is causing her recumbent hypertension.  She is not very happy with her stay in the TCU stating she can sleep well and wants to go home blood pressures are either very high a very low  Due to frequency she was treated for a UTI in the TCU.  It was noted that she again was having frequency and a repeat UA was checked yesterday it appears to be 37 empirically start her on Cipro.  We will follow-up on the cultures and sensitivities and adjust antibiotics based on her culture results.    Review of Systems  Constitutional: Negative.  Negative for fever, chills,has  activity change, appetite change and fatigue.   HENT: Negative for congestion and facial swelling.    Eyes: Negative for photophobia, redness and visual disturbance.   Respiratory: Negative for cough and chest tightness.    Cardiovascular: Negative for chest pain, palpitations and leg swelling.   Gastrointestinal: Negative for nausea, diarrhea, she has chronic constipation,no blood in stool and abdominal distention.   Complaining of abdominal bloating  She is now complaining  of right lower quadrant abdominal pain with cramping and has been taking narcotics frequently due to that  Genitourinary: Negative.  Repeatedly requesting that she be toileted for urinary frequency  Musculoskeletal: Negative.    Complaining of low back pain and using an ice pack  Skin: Negative.    Neurological: Negative for dizziness, tremors, syncope, weakness, light-headedness and headaches.   Hematological: Does not bruise/bleed easily.   Psychiatric/Behavioral: Negative.  Remains anxious  Vitals:    05/22/18 1059   BP: 122/70   Pulse: 63   Temp: 98  F (36.7  C)   Weight: 155 lb (70.3 kg)       Physical Exam  GENERAL: no acute distress. Cooperative in conversation.   HEENT: pupils are equal, round and reactive. Oral mucosa is moist and intact.  RESP:Chest symmetric. Regular respiratory rate. No stridor.  CVS: S1S2; she has an ejection murmur  ABD: Nondistended, soft.  No rebound no guarding but tender to deep palpation in the right lower quadrant  EXTREMITIES: No lower extremity edema.  She has limited abduction in her left shoulder due to an old injury.  Right shoulder has good range of movement limited power and strength noted in the bilateral lower extremities  NEURO: non focal. Alert and oriented x3.   PSYCH: within normal limits. No depression ;has anxiety.  SKIN: warm dry intact   MEDICATION LIST:  Updated Medication list, printed and signed at discharge, please refer to that final medication list from the Skilled Nursing Facility for accuracy.    For UTI she is empirically being given Cipro 250 twice daily for 5 days with advice to follow-up on cultures and sensitivities.  Antibiotic dosage will be adjusted based on her culture results  Fludrocortisone has been discontinued by cardiology as it was causing recumbent hypertension in this patient  Current Outpatient Prescriptions   Medication Sig     acetaminophen (TYLENOL) 500 MG tablet Take 2 tablets (1,000 mg total) by mouth 3 (three) times a day.      bisacodyl (DULCOLAX) 10 mg suppository Insert 10 mg into the rectum daily as needed.     cholecalciferol, vitamin D3, 1,000 unit tablet Take 1,000 Units by mouth daily.     citalopram (CELEXA) 20 MG tablet Take 20 mg by mouth Daily after lunch.      docusate sodium (COLACE) 100 MG capsule Take 1 capsule (100 mg total) by mouth 2 (two) times a day as needed for constipation.     levothyroxine (SYNTHROID, LEVOTHROID) 137 MCG tablet Take 137 mcg by mouth daily.      losartan (COZAAR) 50 MG tablet Take 50 mg by mouth 2 (two) times a day.      melatonin 3 mg Tab tablet Take 3 mg by mouth at bedtime as needed.      metoprolol tartrate (LOPRESSOR) 25 MG tablet Take 25 mg by mouth 2 (two) times a day.      multivitamin with minerals (THERA-M) 9 mg iron-400 mcg Tab tablet Take 1 tablet by mouth daily.     omeprazole (PRILOSEC) 20 MG capsule Take 20 mg by mouth 2 (two) times a day before meals.      oxyCODONE (ROXICODONE) 5 MG immediate release tablet Take 1 tablet (5 mg total) by mouth every 4 (four) hours as needed for pain.     polyethylene glycol (GLYCOLAX) 17 gram/dose powder Take 17 g by mouth daily. Mix with water     PROPYLENE GLYCOL//PF (SYSTANE, PF, OPHT) Administer 1 drop to both eyes 4 (four) times a day as needed.      spironolactone (ALDACTONE) 25 MG tablet Take 0.5 tablets (12.5 mg total) by mouth daily.     triamcinolone (KENALOG) 0.1 % cream Apply thin layer to feet up to twice daily as needed     Recent Results (from the past 240 hour(s))   Urinalysis   Result Value Ref Range    Color, UA Yellow Colorless, Yellow, Straw, Light Yellow    Clarity, UA Cloudy (!) Clear    Glucose, UA Negative Negative    Bilirubin, UA Negative Negative    Ketones, UA Negative Negative    Specific Gravity, UA 1.023 1.001 - 1.030    Blood, UA Negative Negative    pH, UA 6.0 4.5 - 8.0    Protein, UA Trace (!) Negative mg/dL    Urobilinogen, UA <2.0 E.U./dL <2.0 E.U./dL, 2.0 E.U./dL    Nitrite, UA Positive (!) Negative     Leukocytes, UA Large (!) Negative    Bacteria, UA Many (!) None Seen hpf    RBC, UA 0-2 None Seen, 0-2 hpf    WBC, UA >100 (!) None Seen, 0-5 hpf    Squam Epithel, UA 5-10 (!) None Seen, 0-5 lpf    WBC Clumps Present (!) None Seen    Mucus, UA Few (!) None Seen lpf    Ca Oxalate Jennifer, UA Present (!) None Seen       DISCHARGE DIAGNOSIS:    ICD-10-CM    1. Orthostatic hypotension I95.1    2. Hypothyroidism, unspecified type E03.9    3. Major depressive disorder, single episode, moderate F32.1    4. 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 Weak R53.1    6. Anxiety F41.9    7. Uncontrolled hypertension I10    8. Irritable bowel syndrome with constipation K58.1    9. Urinary incontinence in female R32        MEDICAL EQUIPMENT NEEDS:  WALKER  DISCHARGE PLAN/FACE TO FACE:  I certify that services are/were furnished while this patient was under the care of a physician and that a physician or an allowed non-physician practitioner (NPP), had a face-to-face encounter that meets the physician face-to-face encounter requirements. The encounter was in whole, or in part, related to the primary reason for home health. The patient is confined to his/her home and needs intermittent skilled nursing, physical therapy, speech-language pathology, or the continued need for occupational therapy. A plan of care has been established by a physician and is periodically reviewed by a physician.  Date of Face-to-Face Encounter: 5/22/18    I certify that, based on my findings, the following services are medically necessary home health services:  PT/OT/HHA/RN    My clinical findings support the need for the above skilled services because: (Please write a brief narrative summary that describes what the RN, PT, SLP, or other services will be doing in the home. A list of diagnoses in this section does not meet the CMS requirements.) WEAKNESS;CH PAIN    This patient is homebound because: (Please write a brief narrative summary  describing the functional limitations as to why this patient is homebound and specifically what makes this patient homebound.) PAIN;GAIT INSTABILITY    The patient is, or has been, under my care and I have initiated the establishment of the plan of care. This patient will be followed by a physician who will periodically review the plan of care.    Schedule follow up visit with primary care provider within 7 days to reestablish care.  Total time spent was 35 minutes, more than half of it was in face-to-face counseling regarding disease state, treatment, side effects, documentation, review of clinical data and coordination of care  Electronically signed by: ANNIKA Oconnor

## 2021-06-18 NOTE — PROGRESS NOTES
"Cardiology Progress Note    Assessment:  Hypertension,  labile with  pronounced orthostatic drops;   Peripheral edema, resolved  History of abdominal aortic aneurysm stenting  Aortic stenosis, mild to moderate      Plan:  Reduce dose of spironolactone to 12.5 mg a day  Discontinue Florinef as it does worsen recumbent hypertension.  Compressive stockings-preferentially waist high.  She tells me that she will be very reluctant to use them.    Follow-up in 1 month    Subjective:   This is 78 y.o. female who comes in today for follow-up visit.  She was admitted to the hospital with profound weakness.  She was evaluated by neurologist.  Parkinson disease was felt to be unlikely because of her symptoms.  Florinef was added on for orthostatic hypotension.  Today she denies leg swelling or chest pains.  She gets lightheaded when she stands up.  She has not passed out.    Review of Systems:   General: Weight Loss  Eyes: Visual Distubance  Ears/Nose/Throat: Hearing Loss  Lungs: WNL  Heart: Arm Pain, Leg Swelling  Stomach: Constipation, Heartburn  Bladder: Frequent Urination at Night  Muscle/Joints: Joint Pain, Muscle Pain  Skin: WNL  Nervous System: Dizziness  Mental Health: Confusion, Anxiety, Depression     Blood: WNL    Objective:   /76 (Patient Site: Right Arm, Patient Position: Sitting, Cuff Size: Adult Regular)  Pulse 68  Resp 16  Ht 5' 6\" (1.676 m)  Wt 156 lb (70.8 kg)  BMI 25.18 kg/m2  Physical Exam:  GENERAL: no distress  NECK: No JVD  LUNGS: Clear to auscultation.  CARDIAC: regular rhythm, S1 & S2 normal.  No heaves, thrills, gallops.  2/6 systolic ejection murmur at the aortic area  ABDOMEN: flat, negative hepatosplenomegaly, soft and non-tender.  EXTREMITIES: No evidence of cyanosis, clubbing or edema.    Current Outpatient Prescriptions   Medication Sig Dispense Refill     acetaminophen (TYLENOL) 500 MG tablet Take 2 tablets (1,000 mg total) by mouth 3 (three) times a day.  0     bisacodyl (DULCOLAX) " 10 mg suppository Insert 10 mg into the rectum daily as needed.       cholecalciferol, vitamin D3, 1,000 unit tablet Take 1,000 Units by mouth daily.       citalopram (CELEXA) 20 MG tablet Take 20 mg by mouth Daily after lunch.        docusate sodium (COLACE) 100 MG capsule Take 1 capsule (100 mg total) by mouth 2 (two) times a day as needed for constipation. 30 capsule 0     levothyroxine (SYNTHROID, LEVOTHROID) 137 MCG tablet Take 137 mcg by mouth daily.        losartan (COZAAR) 50 MG tablet Take 50 mg by mouth 2 (two) times a day.        melatonin 3 mg Tab tablet Take 3 mg by mouth at bedtime as needed.        metoprolol tartrate (LOPRESSOR) 25 MG tablet Take 25 mg by mouth 2 (two) times a day.        multivitamin with minerals (THERA-M) 9 mg iron-400 mcg Tab tablet Take 1 tablet by mouth daily.       omeprazole (PRILOSEC) 20 MG capsule Take 20 mg by mouth 2 (two) times a day before meals.        oxyCODONE (ROXICODONE) 5 MG immediate release tablet Take 1 tablet (5 mg total) by mouth every 4 (four) hours as needed for pain. 20 tablet 0     polyethylene glycol (GLYCOLAX) 17 gram/dose powder Take 17 g by mouth daily. Mix with water 255 g 0     PROPYLENE GLYCOL//PF (SYSTANE, PF, OPHT) Administer 1 drop to both eyes 4 (four) times a day as needed.        spironolactone (ALDACTONE) 25 MG tablet Take 0.5 tablets (12.5 mg total) by mouth daily. 30 tablet 1     triamcinolone (KENALOG) 0.1 % cream Apply thin layer to feet up to twice daily as needed 30 g 0     No current facility-administered medications for this visit.        Cardiographics:    ECG: Sinus rhythm LVH voltage    Holter: June 2017  Normal    Echocardiogram: May 2017    Left Ventricle: Normal size.The calculated left ventricular ejection fraction is 71%. This represents a normal ejection fraction. Moderate hypertrophy noted. E/e' > 15, suggesting elevated LV filling pressures.    Aortic Valve: The valve is tricuspid. There is mild global calcification  with reduced excursion of the aortic valve present. Mild to moderate stenosis.    Texture of myocardium and small pericardial effusion raises possibility of cardiac amyloidosis      Stress Test: November 2015  1. Lexiscan stress nuclear study is negative for inducible myocardial   ischemia or infarction.     Lab Results:     BNP   Date Value Ref Range Status   10/25/2015 69 0 - 137 pg/mL Final       Parag (Caryl Forrest MD

## 2021-06-18 NOTE — PROGRESS NOTES
[facility] Patient Outreach:     Discharge Date::  5/22/18      Reason for TCU stay (discharge diagnosis)::  Back pain, leg weakness, orthostatic hypotension      Are you feeling better, the same or worse since your discharge?:  Patient is feeling the same (still weak all over.  still doesn't feel strong.  )          As part of your discharge plan, did they discuss home care with you?: Yes        Have your seen them yet, or are they scheduled to visit?: Yes                Do you have any follow up visits scheduled with your PCP or Specialist?:  Yes, with Specialist      Who are you seeing and when is it scheduled?:  Cardiology on 6/22/18.      I'm glad to hear you're doing well and we want you to continue to do well. Your PCP would like to see you for a follow-up visit. Can we help set that up for your today?: No (Patient's daughter takes her to appt's, so daughter will call to schedule appt.  )        (RN) Provided patient the PCP's phone number to call if they have any questions or concerns?: Yes

## 2021-06-18 NOTE — PROGRESS NOTES
Assessment:     1. Heartburn  aluminum-magnesium hydroxide-simethicone 200-200-20 mg/5 mL suspension 30 mL (MAALOX ADVANCED)    aluminum-magnesium hydroxide 200-200 mg/5 mL suspension   2. Vaginal itching  miconazole nitrate 200 mg/5 gram (4 %) vaginal cream 1 applicator (MONISTAT 3)   3. Hypertension     4. Esophageal reflux          Gastroesophageal Reflux Disease, Maalox was given to her in clinic with substantial relief  She will use Maalox as needed.  Monistat for vaginal itching.  Blood pressure is stable    Plan:      Nonpharmacologic treatments were discussed including: eating smaller meals, elevation of the head of bed at night, avoidance of caffeine, chocolate, nicotine and peppermint, and avoiding tight fitting clothing.   Consider follow-up with GI for persistent symptoms    Subjective:      Chief Complaint   Patient presents with     Establish Care     Dr. Ortiz pt .      Form     HC parking paperwork      Urinary Tract Infection     pt had a UTI, still having pressure and itching     Bloated     pt has questions about seeing a GI, pt has alot of gas as well as some acid reflux        Mehreen Camara is an 78 y.o. female who presents for evaluation of heartburn. This has been associated with abdominal bloating, belching and eructation, fullness after meals, heartburn, nocturnal burning and unexpected weight loss. She denies choking on food, cough, deep pressure at base of neck, difficulty swallowing and regurgitation of undigested food. Symptoms have been present for several months. She denies dysphagia. She has had a weight loss of several pounds over a period of 2 years. She denies melena, hematochezia, hematemesis, and coffee ground emesis. Medical therapy in the past has included: proton pump inhibitors.    Her most bothersome symptoms are that as soon as she lays down she feels this intense burning in her chest.  She is on omeprazole twice daily.  She is not taking any Tums or Maalox.    She  also noted some vaginal irritation and itching.  She reports that she has been treated with antibiotics in recent past.  She has been using some nystatin powder.  She is also used Monistat in the past with good success and is wondering if she can use it again.  Defers a pelvic exam today        Of note she has been admitted in hospital and further to TCU as noted below.  She currently has home health nursing following her.    She was admitted in the hospital on April 29 and discharged to TCU on May 2    Reviewed discharge summary from hospital that is as follows  PRINCIPAL & ACTIVE DISCHARGE DIAGNOSES        Generalized leg weakness, subacute after steroid injection.  Concerning for underlying neurodegenerative disorder versus worsening with depression    Orthostatic hypotension, chronic, started on Florinef    Hypothyroidism    Major depressive disorder    GERD    Chronic constipation    That after she was transferred to TCU until May 22 discharged from TCU I-and the discharge summary is reviewed as follows    Mehreen Camara is a 78 y.o. female who was admitted to the TCU as a transfer from the hospital.  As per the history this is a elderly patient who lives in independent living facility with the past medical history significant for severe orthostatic hypotension and chronic back and SI joint pain with venous stasis and AAA status post stent with moderate aortic stenoses ; hypothyroidism;  hyperlipidemia.  She presented with increasing leg weakness for 1 week after undergoing an SI joint steroid injection  She was complaining of increasing bilateral lower extremity leg weakness with some gait instability noted.  Apparently she has had similar issues in the past after getting an SI joint injection.  She had extensive workup done in the hospital and all her lab workups  was normal . ruling out any infection.  Neurology saw her concerned about neurodegenerative disorder like Parkinson's VS MSA..  It was felt  that she may not have Parkinson's but instead multisystem atrophy is possible.  They also entertain the idea of it being psychosomatic versus depression contributing to her significant weakness  She continues to have significant back pain and has been discharged on Tylenol along with oxycodone as needed.  Outpatient follow-up with neurosurgery with consideration for follow-up on her MRI findings recommended for her.  She also has severe orthostatic hypotension and remains on metoprolol for blood pressures.  She had some labile blood pressures with hypotension alternating with severe hypertension.  The hospital has recommended conservative drug dosing with as needed medications.  Patient remains quite anxious about her blood pressure.  She has exhibited severe anxiety driven behaviors apparently she was toileted 8 times in an hour due to repeated urinary frequency she continues to complain of abdominal pain with severe constipation.  She is not reporting diarrhea and wondering if she can discontinue some of her medications  She is upset because her blood sugars have been checked multiple times in a day.  All her blood sugar checks so far have been less than 150  She was seen by psychologist in the hospital and was voicing significant depression anxiety and mood disorder.  It seems that she is taking narcotics around the clock.  On questioning it seems she is taking them primarily for which she describes as abdominal pain in the right lower quadrant and now reporting more pain in her back also  Abdominal x-ray was negative.  Seen by cardiology and taken off fludrocortisone as it is felt that it is causing her recumbent hypertension.  She is not very happy with her stay in the TCU stating she can sleep well and wants to go home blood pressures are either very high a very low  Due to frequency she was treated for a UTI in the TCU.  It was noted that she again was having frequency and a repeat UA was checked yesterday it  appears to be 37 empirically start her on Cipro.  We will follow-up on the cultures and sensitivities and adjust antibiotics based on her culture results.      The following portions of the patient's history were reviewed and updated as appropriate: allergies, current medications, past family history, past medical history, past social history, past surgical history and problem list.    Review of Systems  Respiratory: negative  Cardiovascular: negative  Integument/breast: negative  Hematologic/lymphatic: negative     Objective:     /76 (Patient Site: Right Arm, Patient Position: Sitting, Cuff Size: Adult Regular)  Pulse 60  Temp 97.5  F (36.4  C) (Oral)   Wt 156 lb (70.8 kg)  SpO2 96%  BMI 25.18 kg/m2    General Appearance: healthy, alert, oriented and no distress  HEENT Exam: Oropharynx clear without lesion or exudate  Neck:  supple, no adenopathy, thyroid normal  Heart: Normal heart sounds, S1 and S2, No murmurs.  Lungs: Normal breath sounds, Clear to auscultation bilateral  Skin exam: No visible or palpable abnormalities  Neurological exam: gait normal, alert and oriented X 3  Hem/Lymph/Immuno exam: negative findings:  no cervical nodes, no supraclavicular nodes,

## 2021-06-18 NOTE — PROGRESS NOTES
[facility]      NAME:  Mehreen Camara             :  1940    MRN: [id number]    CODE STATUS:  DNR/DNI    FACILITY: [facility] [[id number]]         CHIEF COMPLAIN/REASON FOR VISIT:  Chief Complaint   Patient presents with     Review Of Multiple Medical Conditions       HISTORY OF PRESENT ILLNESS: Mehreen Camara is a 78 y.o. female being seen at the request of the nurses for concerns of insomnia. Per nurse manager she is having difficulty with sleep. I spoke with pt, and she concurs.  Pt lives in [place] and was recently in acute cre for increased weakness and hypotension. PMH includes:  severe orthostatic hypotension and chronic back and SI joint pain with venous stasis and AAA status post stent with moderate aortic stenoses ; hypothyroidism;  hyperlipidemia.  She presented with increasing leg weakness for 1 week after undergoing an SI joint steroid injection, with increasing bilateral lower extremity leg weakness with some gait instability noted.  Apparently she has had similar issues in the past after getting an SI joint injection. She is not a good historian and tends to wander during her ROS and assessment. She is seen in her room, reports some fatigue.    Allergies   Allergen Reactions     Penicillins Anaphylaxis, Shortness Of Breath and Rash     Aspirin Nausea Only     Atenolol Cough     Atorvastatin Myalgia     Bupropion Nausea Only     Cleocin [Clindamycin Hcl] Other (See Comments)     Constipation      Codeine Nausea Only     Ibuprofen Nausea Only     Stomach upset     Lovastatin Myalgia     Cephalexin Rash     Neck reddness   :     Current Outpatient Prescriptions   Medication Sig     acetaminophen (TYLENOL) 500 MG tablet Take 2 tablets (1,000 mg total) by mouth 3 (three) times a day.     bisacodyl (DULCOLAX) 10 mg suppository Insert 10 mg into the rectum daily as needed.     cholecalciferol, vitamin D3, 1,000 unit tablet Take 1,000 Units by mouth daily.      citalopram (CELEXA) 20 MG tablet Take 20 mg by mouth Daily after lunch.      docusate sodium (COLACE) 100 MG capsule Take 1 capsule (100 mg total) by mouth 2 (two) times a day as needed for constipation.     levothyroxine (SYNTHROID, LEVOTHROID) 137 MCG tablet Take 137 mcg by mouth daily.      losartan (COZAAR) 50 MG tablet Take 50 mg by mouth 2 (two) times a day.      melatonin 3 mg Tab tablet Take 3 mg by mouth at bedtime as needed.      metoprolol tartrate (LOPRESSOR) 25 MG tablet Take 25 mg by mouth 2 (two) times a day.      multivitamin with minerals (THERA-M) 9 mg iron-400 mcg Tab tablet Take 1 tablet by mouth daily.     omeprazole (PRILOSEC) 20 MG capsule Take 20 mg by mouth 2 (two) times a day before meals.      oxyCODONE (ROXICODONE) 5 MG immediate release tablet Take 1 tablet (5 mg total) by mouth every 4 (four) hours as needed for pain.     polyethylene glycol (GLYCOLAX) 17 gram/dose powder Take 17 g by mouth daily. Mix with water     PROPYLENE GLYCOL//PF (SYSTANE, PF, OPHT) Administer 1 drop to both eyes 4 (four) times a day as needed.      spironolactone (ALDACTONE) 25 MG tablet Take 0.5 tablets (12.5 mg total) by mouth daily.     triamcinolone (KENALOG) 0.1 % cream Apply thin layer to feet up to twice daily as needed         REVIEW OF SYSTEMS:    Currently, no fever, chills, or rigors. Does not have any visual or hearing problems. Denies any chest pain, headaches, palpitations, lightheadedness, dizziness, shortness of breath, or cough. Appetite is good. Denies any GERD symptoms. Denies any difficulty with swallowing, nausea, or vomiting.  Denies any abdominal pain, diarrhea or constipation  . Denies any urinary symptoms. Reports insomnia. No active bleeding. No rash.       PHYSICAL EXAMINATION:  Vitals:    05/16/18 1144   BP: 145/87   Pulse: 71   Temp: 97.9  F (36.6  C)   Weight: 154 lb 3.2 oz (69.9 kg)         GENERAL: Awake, Alert, oriented, not in any form of acute distress, answers  questions appropriately, follows simple commands, conversant  HEENT: Head is normocephalic with normal hair distribution. No evidence of trauma. Ears: No acute purulent discharge. Eyes: Conjunctivae pink with no scleral jaundice. Nose: Normal mucosa and septum.    CHEST: No tenderness or deformity, no crepitus  LUNG: Clear to auscultation with good chest expansion. There are no crackles or wheezes, normal AP diameter.  BACK: No kyphosis of the thoracic spine. Symmetric, no curvature, ROM normal, no CVA tenderness, no spinal tenderness   CVS: There is good S1  S2,  rhythm is regular.  ABDOMEN: Globular and soft, nontender to palpation, non distended, no masses, no organomegaly, good bowel sounds, no rebound or guarding, no peritoneal signs.   EXTREMITIES: Atraumatic. Full range of motion on both upper and lower extremities, there is no tenderness to palpation, has pedal edema, no cyanosis or clubbing, no calf tenderness, normal cap refill, no joint swelling.  SKIN: Warm and dry, no erythema noted, no rashes or lesions.  NEUROLOGICAL: The patient is oriented to person, place and time. Strength and sensation are grossly intact. Face is symmetric.                    LABS:    Lab Results   Component Value Date    WBC 7.3 04/30/2018    HGB 14.7 04/30/2018    HCT 43.7 04/30/2018    MCV 93 04/30/2018     04/30/2018       Results for orders placed or performed during the hospital encounter of 04/29/18   Basic Metabolic Panel   Result Value Ref Range    Sodium 137 136 - 145 mmol/L    Potassium 3.9 3.5 - 5.0 mmol/L    Chloride 100 98 - 107 mmol/L    CO2 28 22 - 31 mmol/L    Anion Gap, Calculation 9 5 - 18 mmol/L    Glucose 103 70 - 125 mg/dL    Calcium 8.5 8.5 - 10.5 mg/dL    BUN 9 8 - 28 mg/dL    Creatinine 0.58 (L) 0.60 - 1.10 mg/dL    GFR MDRD Af Amer >60 >60 mL/min/1.73m2    GFR MDRD Non Af Amer >60 >60 mL/min/1.73m2           Lab Results   Component Value Date    HGBA1C 6.0 12/11/2015     Vitamin D, Total  (25-Hydroxy)   Date Value Ref Range Status   04/17/2017 28.1 (L) 30.0 - 80.0 ng/mL Final     Lab Results   Component Value Date    HNYPGLKR12 920 (H) 05/01/2018       ASSESSMENT/PLAN:  1. Primary insomnia    2. Weak    3. Leg edema      Per care conference, pt reports insomnia, we will schedule melatonin 3 mg po Q HS .  Weak, in therapy for rehab services. Was up ambulating with therapy and observed with slow steady gait, does report some fatigue.  I also request use of compression hose for bilateral leg edema.    MCS will follow throughout TCU stay.       Electronically signed by:  Nancy Daly CNP  This progress note was completed using Dragon software and there may be grammatical errors.    35  minutes spent of which greater than 75 % was face to face communication with the patient and nurse manager about above plan of care

## 2021-06-18 NOTE — PROGRESS NOTES
"Cardiology Progress Note    Assessment:  Hypertension,  labile with  pronounced orthostatic drops; improved stable  History of abdominal aortic aneurysm stenting  Aortic stenosis, mild to moderate  GERD    Plan:  Head elevation at bedtime.  She can try it over the counter Zantac at bedtime.    Continue current antihypertensive medications    Follow-up in 3 months    Subjective:   This is 78 y.o. female who comes in today follow-up visit.  She reports burning sensation in her chest after she lays down at night.  Sensation improves after she sits up.  Her blood pressure has been better controlled lately.  Systolic is in the 120s and 130s and diastolics in 70s.  She does get orthostatic drops but not lower than 90 systolic.    Review of Systems:   General: Weight Loss  Eyes: WNL  Ears/Nose/Throat: WNL  Lungs: WNL  Heart: Arm Pain, Leg Swelling  Stomach: Constipation, Heartburn, Nausea  Bladder: Frequent Urination at Night  Muscle/Joints: Joint Pain, Muscle Weakness, Muscle Pain  Skin: WNL  Nervous System: WNL  Mental Health: Anxiety     Blood: WNL    Objective:   /80 (Patient Site: Left Arm, Patient Position: Sitting, Cuff Size: Adult Regular)  Pulse 68  Resp 12  Ht 5' 6\" (1.676 m)  Wt 156 lb (70.8 kg)  BMI 25.18 kg/m2  Physical Exam:  GENERAL: no distress  NECK: No JVD  LUNGS: Clear to auscultation.  CARDIAC: regular rhythm, S1 & S2 normal.  No heaves, thrills, gallops 2/6 systolic ejection murmur at the aortic area  ABDOMEN: flat, negative hepatosplenomegaly, soft and non-tender.  EXTREMITIES: No evidence of cyanosis, clubbing or edema.    Current Outpatient Prescriptions   Medication Sig Dispense Refill     acetaminophen (TYLENOL) 500 MG tablet Take 2 tablets (1,000 mg total) by mouth 3 (three) times a day.  0     bisacodyl (DULCOLAX) 10 mg suppository Insert 10 mg into the rectum daily as needed.       cholecalciferol, vitamin D3, 1,000 unit tablet Take 1,000 Units by mouth daily.       citalopram " (CELEXA) 20 MG tablet Take 20 mg by mouth Daily after lunch.        docusate sodium (COLACE) 100 MG capsule Take 1 capsule (100 mg total) by mouth 2 (two) times a day as needed for constipation. 30 capsule 0     levothyroxine (SYNTHROID, LEVOTHROID) 137 MCG tablet Take 137 mcg by mouth daily.        losartan (COZAAR) 50 MG tablet Take 50 mg by mouth 2 (two) times a day.        melatonin 3 mg Tab tablet Take 3 mg by mouth at bedtime as needed.        metoprolol tartrate (LOPRESSOR) 25 MG tablet Take 25 mg by mouth 2 (two) times a day.        multivitamin with minerals (THERA-M) 9 mg iron-400 mcg Tab tablet Take 1 tablet by mouth daily.       nystatin (MYCOSTATIN) powder Apply to affected area 3 times daily 15 g 0     omeprazole (PRILOSEC) 20 MG capsule Take 20 mg by mouth 2 (two) times a day before meals.        polyethylene glycol (GLYCOLAX) 17 gram/dose powder Take 17 g by mouth daily. Mix with water 255 g 0     PROPYLENE GLYCOL//PF (SYSTANE, PF, OPHT) Administer 1 drop to both eyes 4 (four) times a day as needed.        spironolactone (ALDACTONE) 25 MG tablet Take 0.5 tablets (12.5 mg total) by mouth daily. 30 tablet 1     triamcinolone (KENALOG) 0.1 % cream Apply thin layer to feet up to twice daily as needed 30 g 0     omeprazole (PRILOSEC) 20 MG capsule TAKE ONE CAPSULE BY MOUTH TWICE DAILY 180 capsule 0     traMADol (ULTRAM) 50 mg tablet Take 1 tablet (50 mg total) by mouth every 8 (eight) hours as needed for pain. 20 tablet 0     No current facility-administered medications for this visit.        Cardiographics:    ECG: Sinus rhythm LVH voltage     Holter: June 2017  Normal     Echocardiogram: May 2017    Left Ventricle: Normal size.The calculated left ventricular ejection fraction is 71%. This represents a normal ejection fraction. Moderate hypertrophy noted. E/e' > 15, suggesting elevated LV filling pressures.    Aortic Valve: The valve is tricuspid. There is mild global calcification with reduced  excursion of the aortic valve present. Mild to moderate stenosis.    Texture of myocardium and small pericardial effusion raises possibility of cardiac amyloidosis      Stress Test: November 2015  1. Lexiscan stress nuclear study is negative for inducible myocardial   ischemia or infarction.     Lab Results:       Lab Results   Component Value Date    CHOL 256 (H) 09/22/2015    CHOL 241 (H) 03/10/2015    CHOL 252 (H) 11/10/2014     Lab Results   Component Value Date    HDL 43 09/22/2015    HDL 43 03/10/2015    HDL 35 (L) 11/10/2014     Lab Results   Component Value Date    LDLCALC 195 (H) 09/22/2015    LDLCALC 168 (H) 03/10/2015    LDLCALC 174 (H) 11/10/2014     Lab Results   Component Value Date    TRIG 91 09/22/2015    TRIG 149 03/10/2015    TRIG 216 (H) 11/10/2014     BNP   Date Value Ref Range Status   10/25/2015 69 0 - 137 pg/mL Final       Parag (Hector)  MD Adriane

## 2021-06-18 NOTE — PROGRESS NOTES
ASSESSMENT & PLAN:  Patient continues with home care since TCU discharge  1. History of UTI  UA is completely clear today.  She finished her Cipro yesterday.  Patient notified of results.  - Urinalysis-UC if Indicated    2. Anxiety  Observed both during hospitalization and her TCU stay to have significant impact on patient.  Patient is frequently fixated on a physical symptom or a perceived medication side effect.  Her medication regimen has changed countless times over the last 2 years both regarding her antihypertensive regimen due to her labile hypertension and episodes of lightheadedness, and her anxiety medication has been changed a number of times as well.  Now she is stating the citalopram does not work at all and she wants to consider going back to sertraline.  I am not certain how well the sertraline is working for her either.  I would strongly recommend a psychiatry consult, which I have advised in the past, but has never been done (other than inpatient).  I strongly advised they schedule this, although she is quite hesitant.  She wants to know how they can help.  I explained to patient that we have tried various medications, have never been able to really control her anxiety, which I feel is significantly impacting her life, and the best way to hopefully get her feeling better as quick as possible would be with expertise of psychiatry.  Referral placed in specialty  to contact daughter Leticia to set that up.  - Ambulatory referral to Psychiatry    3. Post-menopausal  - DXA Bone Density Scan; Future    4.  Labile hypertension  Patient with significant supine hypertension, as well as orthostatic hypotension.  Continues to be followed by cardiology.  In the past, there have been multiple medication changes by inpatient medicine, TCU, clinical pharmacist, endocrinologist, cardiologist.  At this point, cardiology is managing this issue.    5.  Features of parkinsonism  Patient has some features of  parkinsonism.  With her significant anxiety, lightheadedness, gait changes, I referred her about 2 years ago to neurology for possible Parkinson's versus multisystem atrophy.  Neurology evaluation at that time felt she was not exhibiting symptoms of a specific disorder.  She was reevaluated by neurology during her recent hospitalization and they felt that again this was not Parkinson's, but there is a possibility multisystem atrophy.  Follow-up after discharge recommended but not scheduled.  I will have our specialty  contact her daughter Leticia at patient's request to set this up.    6.  AAA status post endovascular repair  Stable    7.  Adrenal incidentaloma  Had been evaluated by endocrinology and felt not to be hormonally active    There are no Patient Instructions on file for this visit.    Orders Placed This Encounter   Procedures     DXA Bone Density Scan     Standing Status:   Future     Number of Occurrences:   1     Standing Expiration Date:   5/29/2019     Order Specific Question:   Can the procedure be changed per Radiologist protocol?     Answer:   Yes     Urinalysis-UC if Indicated     Ambulatory referral to Psychiatry     Referral Priority:   Routine     Referral Type:   Behavioral Health     Referral Reason:   Evaluation and Treatment     Requested Specialty:   Psychiatry     Number of Visits Requested:   1     Medications Discontinued During This Encounter   Medication Reason     ciprofloxacin HCl (CIPRO) 250 MG tablet Therapy completed       No Follow-up on file.    CHIEF COMPLAINT:  Chief Complaint   Patient presents with     Follow-up     UTI-finished Cipro 250 mg BID x 5 days yesterday, recheck urine today, discuss different pain medication-tramadol making her feel tired       HISTORY OF PRESENT ILLNESS:  Mehreen is a 78 y.o. female presenting to the clinic today for bladder infection and fatigue, follow up hospitalization and TCU stay. She is accompanied by her daughter. She completed  her five day 250 mg Cipro course yesterday and would like her urine rechecked.     Back/Neck Pain: She notes that the tramadol makes her sleepy. She notes that when she was taking lorazepam she was able to sleep better.    Lower Extremity Edema: She has discontinued Florinef and decreased her spironolactone dose to 12.5 mg daily. She does not believe her feet are as swollen anymore.  No longer having as much difficulty with redness on the skin of her feet.  Notes that some certain shoes make the redness more prominent.    REVIEW OF SYSTEMS:   She sees an eye doctor regularly. She sometimes notices spots in her vision that are moving around, which she notified her eye doctor of. She wears glasses. All other systems are negative.    PFSH:  Surgical: Cataract surgery 2008.    TOBACCO USE:  History   Smoking Status     Former Smoker     Types: Cigarettes     Quit date: 12/11/1994   Smokeless Tobacco     Never Used       VITALS:  Vitals:    05/29/18 1429   BP: 142/68   Patient Site: Left Arm   Patient Position: Sitting   Cuff Size: Adult Regular   Pulse: 64   Resp: 14   Temp: 98.2  F (36.8  C)   TempSrc: Oral   Weight: 158 lb 6.4 oz (71.8 kg)     Wt Readings from Last 3 Encounters:   05/29/18 158 lb 6.4 oz (71.8 kg)   05/22/18 155 lb (70.3 kg)   05/17/18 156 lb (70.8 kg)     Body mass index is 25.57 kg/(m^2).    PHYSICAL EXAM:  GENERAL: Pleasant, well-appearing patient in no acute distress.    HEENT: Sclera clear.  Oropharynx with moist mucous membranes.  CARDIOVASCULAR: Heart regular rate and rhythm without murmur normal S1-S2   LUNGS: Clear to auscultation bilaterally, good air movement throughout   EXTREMITIES Warm and well-perfused without edema. Pedal pulses palpable and symmetric bilaterally   NEURO: Alert and oriented. Grossly nonfocal.   PSYCHIATRIC: Presents on time and well groomed. Normal speech and thought content. Anxious affect. No abnormal movements or behaviors noted.    ADDITIONAL HISTORY SUMMARIZED (2):  Reviewed 5/14/18 cardiology note regarding weakness and lightheadedness.  DECISION TO OBTAIN EXTRA INFORMATION (1): None.   RADIOLOGY TESTS (1): None.  LABS (1): Ordered urinalysis, clear.  MEDICINE TESTS (1): None.  INDEPENDENT REVIEW (2 each): None.       The visit lasted a total of 38 minutes face to face with the patient. Over 50% of the time was spent counseling and educating the patient about anxiety.    IIrene, am scribing for and in the presence of, Dr. Ortiz.    I, Dr. Ortiz, personally performed the services described in this documentation, as scribed by Irene Piña in my presence, and it is both accurate and complete.    MEDICATIONS:  Current Outpatient Prescriptions   Medication Sig Dispense Refill     acetaminophen (TYLENOL) 500 MG tablet Take 2 tablets (1,000 mg total) by mouth 3 (three) times a day.  0     bisacodyl (DULCOLAX) 10 mg suppository Insert 10 mg into the rectum daily as needed.       cholecalciferol, vitamin D3, 1,000 unit tablet Take 1,000 Units by mouth daily.       citalopram (CELEXA) 20 MG tablet Take 20 mg by mouth Daily after lunch.        docusate sodium (COLACE) 100 MG capsule Take 1 capsule (100 mg total) by mouth 2 (two) times a day as needed for constipation. 30 capsule 0     levothyroxine (SYNTHROID, LEVOTHROID) 137 MCG tablet Take 137 mcg by mouth daily.        losartan (COZAAR) 50 MG tablet Take 50 mg by mouth 2 (two) times a day.        melatonin 3 mg Tab tablet Take 3 mg by mouth at bedtime as needed.        metoprolol tartrate (LOPRESSOR) 25 MG tablet Take 25 mg by mouth 2 (two) times a day.        multivitamin with minerals (THERA-M) 9 mg iron-400 mcg Tab tablet Take 1 tablet by mouth daily.       omeprazole (PRILOSEC) 20 MG capsule Take 20 mg by mouth 2 (two) times a day before meals.        polyethylene glycol (GLYCOLAX) 17 gram/dose powder Take 17 g by mouth daily. Mix with water 255 g 0     PROPYLENE GLYCOL//PF (SYSTANE, PF, OPHT) Administer 1 drop to both  eyes 4 (four) times a day as needed.        spironolactone (ALDACTONE) 25 MG tablet Take 0.5 tablets (12.5 mg total) by mouth daily. 30 tablet 1     traMADol (ULTRAM) 50 mg tablet Take 1 tablet (50 mg total) by mouth every 8 (eight) hours as needed for pain. 20 tablet 0     triamcinolone (KENALOG) 0.1 % cream Apply thin layer to feet up to twice daily as needed 30 g 0     No current facility-administered medications for this visit.        Total data points: 3

## 2021-06-18 NOTE — PROGRESS NOTES
[facility]      Code Status:  DNR/DNI  Visit Type: Review Of Multiple Medical Conditions     Facility:  [facility] [[id number]]           History of Present Illness: Mehreen Camara is a 78 y.o. female who is currently admitted to the TCU as a transfer from the hospital.  As per the history this is a elderly patient who lives in independent living facility with the past medical history significant for severe orthostatic hypotension and chronic back and SI joint pain with venous stasis and AAA status post stent with moderate aortic stenoses ; hypothyroidism;  hyperlipidemia.  She presented with increasing leg weakness for 1 week after undergoing an SI joint steroid injection  She was complaining of increasing bilateral lower extremity leg weakness with some gait instability noted.  Apparently she has had similar issues in the past after getting an SI joint injection.  She had extensive workup done in the hospital and all her lab workups  was normal . ruling out any infection.  Neurology saw her concerned about neurodegenerative disorder like Parkinson's VS MSA..  It was felt that she may not have Parkinson's but instead multisystem atrophy is possible.  They also entertain the idea of it being psychosomatic versus depression contributing to her significant weakness  She continues to have significant back pain and has been discharged on Tylenol along with oxycodone as needed.  Outpatient follow-up with neurosurgery with consideration for follow-up on her MRI findings recommended for her.  She also has severe orthostatic hypotension and remains on metoprolol for blood pressures.  She had some labile blood pressures with hypotension alternating with severe hypertension.  The hospital has recommended conservative drug dosing with as needed medications.  Patient remains quite anxious about her blood pressure.  She has exhibited severe anxiety driven behaviors apparently she was  toileted 8 times in an hour due to repeated urinary frequency she continues to complain of abdominal pain with severe constipation.  She is not reporting diarrhea and wondering if she can discontinue some of her medications  She is upset because her blood sugars have been checked multiple times in a day.  All her blood sugar checks so far have been less than 150  She was seen by psychologist in the hospital and was voicing significant depression anxiety and mood disorder.  She is having UTI.  It seems that she is taking narcotics around the clock.  On questioning it seems she is taking them primarily for which she describes as abdominal pain in the right lower quadrant and now reporting more pain in her back also  Abdominal x-ray was negative.  Seen by cardiology and taken off fludrocortisone as it is felt that it is causing her recumbent hypertension.  She is not very happy with her stay in the TCU stating she can sleep well and wants to go home blood pressures are either very high a very low    Past Medical History:   Diagnosis Date     AAA (abdominal aortic aneurysm)      Acute encephalopathy 9/26/2015     Adrenal adenoma      Harris esophagus      Constipation      Depression      GERD (gastroesophageal reflux disease)      Hiatal hernia      HLD (hyperlipidemia)      HTN (hypertension)      Hypothyroid      Murmur      Osteoarthritis      Retinal Migraine Headache      S/P AAA repair using bifurcation graft 11/30/2015     Scoliosis      Thyroid nodule     removed nodules and thyroid     Past Surgical History:   Procedure Laterality Date     JOINT REPLACEMENT       AR ARTHRODESIS ANT INTERBODY MIN DISCECTOMY, CERVICAL BELOW C2      Description: Cervical Vertebral Fusion;  Recorded: 01/21/2013;  Comments: 1981     AR REVISE MEDIAN N/CARPAL TUNNEL SURG      Description: Neuroplasty Decompression Median Nerve At Carpal Tunnel;  Recorded: 01/21/2013;  Comments: bilateral     AR THYROIDECTOMY      Description:  Thyroid Surgery Total Thyroidectomy;  Recorded: 06/08/2011;     NH TOTAL ABDOM HYSTERECTOMY  1985    Description: Hysterectomy;  Recorded: 01/21/2013;     NH TOTAL HIP ARTHROPLASTY Right     Description: Total Hip Replacement;  Recorded: 01/21/2013;  Comments: right, 2003     Family History   Problem Relation Age of Onset     Heart disease Mother      Hypertension Mother      Heart disease Father      Cancer Brother 57     colon     Hypertension Daughter      Hypertension Daughter      Neuropathy Daughter      Lupus Daughter      Heart disease Paternal Aunt      Heart disease Paternal Uncle      Parkinsonism Paternal Uncle      Social History     Social History     Marital status:      Spouse name: N/A     Number of children: N/A     Years of education: N/A     Occupational History     Not on file.     Social History Main Topics     Smoking status: Former Smoker     Types: Cigarettes     Quit date: 12/11/1994     Smokeless tobacco: Never Used     Alcohol use No     Drug use: No     Sexual activity: Not on file     Other Topics Concern     Not on file     Social History Narrative     Current Outpatient Prescriptions   Medication Sig Dispense Refill     acetaminophen (TYLENOL) 500 MG tablet Take 2 tablets (1,000 mg total) by mouth 3 (three) times a day.  0     bisacodyl (DULCOLAX) 10 mg suppository Insert 10 mg into the rectum daily as needed.       cholecalciferol, vitamin D3, 1,000 unit tablet Take 1,000 Units by mouth daily.       citalopram (CELEXA) 20 MG tablet Take 20 mg by mouth Daily after lunch.        docusate sodium (COLACE) 100 MG capsule Take 1 capsule (100 mg total) by mouth 2 (two) times a day as needed for constipation. 30 capsule 0     levothyroxine (SYNTHROID, LEVOTHROID) 137 MCG tablet Take 137 mcg by mouth daily.        losartan (COZAAR) 50 MG tablet Take 50 mg by mouth 2 (two) times a day.        melatonin 3 mg Tab tablet Take 3 mg by mouth at bedtime as needed.        metoprolol tartrate  (LOPRESSOR) 25 MG tablet Take 25 mg by mouth 2 (two) times a day.        multivitamin with minerals (THERA-M) 9 mg iron-400 mcg Tab tablet Take 1 tablet by mouth daily.       omeprazole (PRILOSEC) 20 MG capsule Take 20 mg by mouth 2 (two) times a day before meals.        oxyCODONE (ROXICODONE) 5 MG immediate release tablet Take 1 tablet (5 mg total) by mouth every 4 (four) hours as needed for pain. 20 tablet 0     polyethylene glycol (GLYCOLAX) 17 gram/dose powder Take 17 g by mouth daily. Mix with water 255 g 0     PROPYLENE GLYCOL//PF (SYSTANE, PF, OPHT) Administer 1 drop to both eyes 4 (four) times a day as needed.        spironolactone (ALDACTONE) 25 MG tablet Take 0.5 tablets (12.5 mg total) by mouth daily. 30 tablet 1     triamcinolone (KENALOG) 0.1 % cream Apply thin layer to feet up to twice daily as needed 30 g 0     No current facility-administered medications for this visit.      Allergies   Allergen Reactions     Penicillins Anaphylaxis, Shortness Of Breath and Rash     Aspirin Nausea Only     Atenolol Cough     Atorvastatin Myalgia     Bupropion Nausea Only     Cleocin [Clindamycin Hcl] Other (See Comments)     Constipation      Codeine Nausea Only     Ibuprofen Nausea Only     Stomach upset     Lovastatin Myalgia     Cephalexin Rash     Neck reddness         Review of Systems:    Constitutional: Negative.  Negative for fever, chills,has  activity change, appetite change and fatigue.   HENT: Negative for congestion and facial swelling.    Eyes: Negative for photophobia, redness and visual disturbance.   Respiratory: Negative for cough and chest tightness.    Cardiovascular: Negative for chest pain, palpitations and leg swelling.   Gastrointestinal: Negative for nausea, diarrhea, she has chronic constipation,no blood in stool and abdominal distention.   Complaining of abdominal bloating  She is now complaining of right lower quadrant abdominal pain with cramping and has been taking narcotics  frequently due to that  Genitourinary: Negative.  Repeatedly requesting that she be toileted for urinary frequency  Musculoskeletal: Negative.    Complaining of low back pain and using an ice pack  Skin: Negative.    Neurological: Negative for dizziness, tremors, syncope, weakness, light-headedness and headaches.   Hematological: Does not bruise/bleed easily.   Psychiatric/Behavioral: Negative.  Remains anxious    Vitals:    05/17/18 2123   BP: 156/85   Pulse: 72   Temp: 98  F (36.7  C)       Physical Exam:    GENERAL: no acute distress. Cooperative in conversation.   HEENT: pupils are equal, round and reactive. Oral mucosa is moist and intact.  RESP:Chest symmetric. Regular respiratory rate. No stridor.  CVS: S1S2; she has an ejection murmur  ABD: Nondistended, soft.  No rebound no guarding but tender to deep palpation in the right lower quadrant  EXTREMITIES: No lower extremity edema.  She has limited abduction in her left shoulder due to an old injury.  Right shoulder has good range of movement limited power and strength noted in the bilateral lower extremities  NEURO: non focal. Alert and oriented x3.   PSYCH: within normal limits. No depression ;has anxiety.  SKIN: warm dry intact     Labs:        Assessment/Plan:    Bilateral leg weakness status post epidural steroid injection in her SI joint.  She has a history of repetitive bilateral lower extremity weakness after steroid injection in her SI joints in the past.  Associated with that she had severe low back pain.  Extensive workup done in the hospital to rule out infectious etiology came back normal with normal white count normal CRP normal head CT with no fever.  Neurology suspect she has some neurodegenerative disorder not ruling out Parkinson's welt versus multisystem atrophy.  Discharge for PT OT and rehab with outpatient follow-up with neurology.  Ambulating currently using a walker    Acute back pain.  Discharge with physical therapy Tylenol and  oxycodone as needed for management.  If she continues to have symptoms she will do an outpatient follow-up with neurosurgery with repeat imaging including MRI been recommended for her.    Severe orthostatic hypotension which is a long-standing concern.  She has had labile blood pressures in the TCU as well as in the hospital  They have recommended continuing with her medications with minimal adjustment of possible apparently she has had her medications held frequently including metoprolol losartan the hospital due to hypertension  Early discharge and fludrocortisone daily along with metoprolol 25 twice daily and losartan 50 twice daily  As recommended by hospital no changes to blood pressure meds made today.  Blood pressures are either very high or very low.  Seen by cardiology and taken off her fludrocortisone and given a low-dose of spironolactone at 12.5 daily    Hypothyroidism on replacement Synthroid    Mood disorder with significant anxiety being reported by patient with repetitive concerns she is on Celexa does not think it is very effective for her.  Follow-up psychology eval will be ordered for her she was seeing psychology in the hospital.    Chronic constipation /IBS-continue with his stool regimen    GERD on a PPI    History of insomnia on melatonin     Hyperglycemia currently patient has been discharged with orders for blood sugar checks twice a day.  She is very upset about that continue to monitor and if stable will discontinue checks.  All her blood sugars are adequately controlled him to discontinue her Accu-Cheks    Uti-cultures grew more than 100,000 colonies of E. coli which appears to be pansensitive   on Macrodantin    Abdominal pain primarily in the right lower quadrant unclear etiology -improved    Debilitation-quite profound she is here for PT OT and rehab requesting a DNR/DNI CODE STATUS  She remains emotionally labile she has been requesting that none of her cardiac meds be adjusted.  She  does not want any additional meds added to her regimen so we will respect that.  Walking in PT.  Unhappy with his stay and wants to go home      Electronically signed by: ANNIKA Oconnor  This progress note was completed using Dragon software and there may be grammatical errors.

## 2021-06-18 NOTE — PROGRESS NOTES
Patient is still in TCU- [facility]. Will continue to monitor chart for discharge and schedule with PCP.

## 2021-06-18 NOTE — PROGRESS NOTES
Patient is receiving home care at this time, working on safety in home. Patient was referred to psychiatry and neurology, discussed with specialty  who is waiting on the order. Will follow up with patient once these are scheduled.

## 2021-06-19 NOTE — PROGRESS NOTES
[facility]      Code Status:  DNR/DNI/requesting selective treatment and comfort focused treatments only  Visit Type: Review Of Multiple Medical Conditions     Facility:  [facility] [[id number]]           History of Present Illness: Mehreen Camara is a 78 y.o. female who is currently admitted to the TCU as a transfer from the hospital.  As per the history this is a elderly patient who lives in independent living facility with the past medical history significant for severe orthostatic hypotension and chronic back and SI joint pain with venous stasis and AAA status post stent with moderate aortic stenoses ; hypothyroidism;  hyperlipidemia.  She presented with increasing leg weakness   She was recently in the TCU with bilateral lower extremity weakness and was discharged back and now is back with similar symptoms again the hospital.  She has had extensive neurological workup in May 2018 with no finding.  She had MRI brain and cervical spine imaging which did show multilevel cervical spine degenerative changes but no change otherwise  She was seen by neurology and they have initiated Sinemet for presumed Parkinson's and recommended PT OT.  they recommended she see a movement disorder clinic  She also has a history of orthostatic hypotension with labile hypertension.  She was experiencing bradycardia in the hospital associated with hypotension.  She was on beta-blockers.  Workup was done in the hospital there was no evidence of adrenal insufficiency.  On telemetry she did have sinus bradycardia with heart rates dropping down to the 40s her metoprolol was discontinued and amlodipine has been prescribed for blood pressure management at a low-dose of 2.5 mg.  She continues with her losartan and spironolactone and wants to see cardiology.  Patient is requesting no change in her cardiac meds without consultation with her cardiologist in the TCU.  She continues to exhibit  significant anxiety with adjustment disorder and her Celexa dosage was increased to 30 mg . melatonin was added.  She also has a chronic history of neck back abdominal pain and is narcotic dependent she got multiple doses of oxycodone in the hospital  Pain management was consulted and her opiates have been discontinued they recommended supportive treatment with lidocaine patch diclofenac gel and Tylenol.  She continues to complain of severe back pain .  Today she had a significant orthostatic drop with high BP in am. She was dizzy when she stoodup  Has abd pain on and off with constipation and bloating-no improvement with medication changes  Has a GI and cardaic f/u soon        Past Medical History:   Diagnosis Date     AAA (abdominal aortic aneurysm) (H)      Acute encephalopathy 9/26/2015     Adrenal adenoma      Harris esophagus      Constipation      Depression      GERD (gastroesophageal reflux disease)      Hiatal hernia      HLD (hyperlipidemia)      HTN (hypertension)      Hypothyroid      Murmur      Osteoarthritis      Retinal Migraine Headache      S/P AAA repair using bifurcation graft 11/30/2015     Scoliosis      Thyroid nodule     removed nodules and thyroid     Past Surgical History:   Procedure Laterality Date     JOINT REPLACEMENT       AZ ARTHRODESIS ANT INTERBODY MIN DISCECTOMY, CERVICAL BELOW C2      Description: Cervical Vertebral Fusion;  Recorded: 01/21/2013;  Comments: 1981     AZ REVISE MEDIAN N/CARPAL TUNNEL SURG      Description: Neuroplasty Decompression Median Nerve At Carpal Tunnel;  Recorded: 01/21/2013;  Comments: bilateral     AZ THYROIDECTOMY      Description: Thyroid Surgery Total Thyroidectomy;  Recorded: 06/08/2011;     AZ TOTAL ABDOM HYSTERECTOMY  1985    Description: Hysterectomy;  Recorded: 01/21/2013;     AZ TOTAL HIP ARTHROPLASTY Right     Description: Total Hip Replacement;  Recorded: 01/21/2013;  Comments: right, 2003     Family History   Problem Relation Age of Onset      Heart disease Mother      Hypertension Mother      Heart disease Father      Cancer Brother 57     colon     Hypertension Daughter      Hypertension Daughter      Neuropathy Daughter      Lupus Daughter      Heart disease Paternal Aunt      Heart disease Paternal Uncle      Parkinsonism Paternal Uncle      Social History     Social History     Marital status:      Spouse name: N/A     Number of children: N/A     Years of education: N/A     Occupational History     Not on file.     Social History Main Topics     Smoking status: Former Smoker     Types: Cigarettes     Quit date: 12/11/1994     Smokeless tobacco: Never Used     Alcohol use No     Drug use: No     Sexual activity: Not on file     Other Topics Concern     Not on file     Social History Narrative     Current Outpatient Prescriptions   Medication Sig Dispense Refill     acetaminophen (TYLENOL) 500 MG tablet Take 2 tablets (1,000 mg total) by mouth 3 (three) times a day. (Patient taking differently: Take 1,000 mg by mouth 3 (three) times a day. )  0     aluminum-magnesium hydroxide 200-200 mg/5 mL suspension Take 10 mL by mouth every 6 (six) hours as needed for indigestion. (Patient taking differently: Take 5 mL by mouth every 4 (four) hours as needed for indigestion. ) 200 mL 1     amLODIPine (NORVASC) 2.5 MG tablet Take 1 tablet (2.5 mg total) by mouth daily. Hold for sbp<110  0     bisacodyl (DULCOLAX) 10 mg suppository Insert 10 mg into the rectum daily as needed.       CALCIUM CITRATE ORAL Take 500 mg by mouth daily.       carbidopa-levodopa (SINEMET)  mg per tablet Take 2 tablets by mouth 3 (three) times a day. (Patient taking differently: Take 2 tablets by mouth 3 (three) times a day. Give 200 mg  by mouth three times a day related to WEAKNESS)  0     cholecalciferol, vitamin D3, 1,000 unit tablet Take 2,000 Units by mouth daily.        citalopram (CELEXA) 20 MG tablet Take 1.5 tablets (30 mg total) by mouth Daily after lunch.  0      diclofenac sodium (VOLTAREN) 1 % Gel Lower extremities: 4 g to the affected area 4 times daily. Upper extremities: 2 g to the affected area 4 times daily. Do not exceed 32 g total dose per day. (Patient taking differently: 2 g 4 (four) times a day. Diclofenac Sodium Gel 1 % Apply 2 gram  transdermally four times a day for Pain Apply to upper  extremities Do not exceed 32 g total per day)  0     docusate sodium (COLACE) 100 MG capsule Take 1 capsule (100 mg total) by mouth 2 (two) times a day as needed for constipation. 30 capsule 0     gabapentin (NEURONTIN) 100 MG capsule Take 100 mg by mouth 2 (two) times a day.       gabapentin (NEURONTIN) 100 MG capsule Take 200 mg by mouth at bedtime.       ibandronate (BONIVA) 150 mg tablet Take 150 mg by mouth every 30 (thirty) days. Give  150 mg by mouth one time a day every 1 month(s)  starting on the 20th for 28 day(s) related to AGERELATED  OSTEOPOROSIS WITHOUT CURRENT  PATHOLOGICAL FRACTURE (M81.0) Start on the  20th and end on the 20th every month. Take on  empty stomach, 30 minutes before breakfast with full  glass of water and must remain sitting up for 30  minutes       levothyroxine (SYNTHROID, LEVOTHROID) 137 MCG tablet Take 137 mcg by mouth daily.        lidocaine 4 % patch Place 1 patch on the skin daily. Remove and discard patch with 12 hours or as directed by MD.       losartan (COZAAR) 50 MG tablet Take 50 mg by mouth 2 (two) times a day.        MAGNESIUM HYDROXIDE, BULK, MISC Use 30 mL As Directed. Milk of Magnesia Suspension 1200 MG/15ML  (Magnesium Hydroxide) Give 30 ml by mouth as  needed for Constipation       melatonin 3 mg Tab tablet Take 3 mg by mouth at bedtime as needed.        multivitamin (MULTIPLE VITAMIN ORAL) Take 1 tablet by mouth daily.       nystatin (MYCOSTATIN) powder Apply topically 3 (three) times a day. Apply to Affected area topically three  times a day for Yeast       omeprazole (PRILOSEC) 20 MG capsule Take 20 mg by mouth 2 (two)  times a day before meals.        peg 400-propylene glycol (SYSTANE) 0.4-0.3 % Drop Administer 1 drop to both eyes 4 (four) times a day as needed.       polyethylene glycol (MIRALAX) 17 gram packet Take 17 g by mouth daily.       simethicone (MYLICON) 80 MG chewable tablet Chew 80 mg 4 (four) times a day as needed for flatulence. Simethicone Tablet 80 MG Give 80 mg by mouth as  needed for Abdominal Distention 4 times daily after  meals and HS as needed       spironolactone (ALDACTONE) 25 MG tablet Take 0.5 tablets (12.5 mg total) by mouth daily. (Patient taking differently: Take 12.5 mg by mouth 2 (two) times a day. ) 30 tablet 1     sucralfate (CARAFATE) 100 mg/mL suspension Take 1 g by mouth 2 (two) times a day.       triamcinolone (KENALOG) 0.1 % cream Apply thin layer to feet up to twice daily as needed (Patient taking differently: 2 (two) times a day as needed. Apply thin layer to feet up to twice daily as needed) 30 g 0     No current facility-administered medications for this visit.      Allergies   Allergen Reactions     Penicillins Anaphylaxis, Shortness Of Breath and Rash     Aspirin Nausea Only     Atenolol Cough     Atorvastatin Myalgia     Bupropion Nausea Only     Cleocin [Clindamycin Hcl] Other (See Comments)     Constipation      Codeine Nausea Only     Ibuprofen Nausea Only     Stomach upset     Lovastatin Myalgia     Cephalexin Rash     Neck reddness     Review of Systems:    Constitutional: Negative.  Negative for fever, chills,has  activity change, appetite change and fatigue.   She experiences significant orthostatic drop in her blood pressure greater than 50 points in sbp.sbp was 187 in am and dropped down to more than 110 sbp  When she stood up  HENT: Negative for congestion and facial swelling.    Eyes: Negative for photophobia, redness and visual disturbance.   Respiratory: Negative for cough and chest tightness.    Cardiovascular: Negative for chest pain, palpitations and HAS leg swelling.    Gastrointestinal: Negative for nausea, diarrhea, she has chronic constipation,no blood in stool and abdominal distention.   Complaining of abdominal bloating  She is now been  complaining of intermittent right lower quadrant abdominal pain with cramping .  Genitourinary: Negative.  y  Musculoskeletal: Negative.    Complaining of low back pain   Skin: Negative.    Neurological: Negative for dizziness, tremors, syncope, weakness, light-headedness and headaches.   Hematological: Does not bruise/bleed easily.   Psychiatric/Behavioral: Negative.  Remains anxious    Vitals:    08/02/18 1017   BP: 132/81   Pulse: 64   Temp: 98  F (36.7  C)       Physical Exam:    GENERAL: no acute distress. Cooperative in conversation.   HEENT: pupils are equal, round and reactive. Oral mucosa is moist and intact.  RESP:Chest symmetric. Regular respiratory rate. No stridor.  CVS: S1S2; she has an ejection murmur  ABD: Nondistended, soft.  No rebound no guarding but tender to deep palpation in the right lower quadrant  EXTREMITIES: Trace lower extremity edema.  She has limited abduction in her left shoulder due to an old injury.  Right shoulder has good range of movement limited power and strength noted in the bilateral lower extremities  NEURO: non focal. Alert and oriented x3.   PSYCH: within normal limits. No depression ;has anxiety.  SKIN: warm dry intact     Labs:      Results for orders placed or performed during the hospital encounter of 07/04/18   Basic metabolic panel   Result Value Ref Range    Sodium 138 136 - 145 mmol/L    Potassium 4.4 3.5 - 5.0 mmol/L    Chloride 102 98 - 107 mmol/L    CO2 30 22 - 31 mmol/L    Anion Gap, Calculation 6 5 - 18 mmol/L    Glucose 104 70 - 125 mg/dL    Calcium 8.9 8.5 - 10.5 mg/dL    BUN 11 8 - 28 mg/dL    Creatinine 0.59 (L) 0.60 - 1.10 mg/dL    GFR MDRD Af Amer >60 >60 mL/min/1.73m2    GFR MDRD Non Af Amer >60 >60 mL/min/1.73m2       Assessment/Plan:    Bilateral leg weakness s She has a  history of repetitive bilateral lower extremity weakness  Associated with that she had severe low back pain.  Neurology suspect she has some neurodegenerative disorder not ruling out Parkinson's welt versus multisystem atrophy.  Repeat imaging included MRI brain and C-spine did not show any new changes she does have multilevel C-spine degenerative changes but no progress since 4/2018  Discharge for PT OT and rehab with outpatient follow-up with neurology.  Ambulating currently using a walker  She has been initiated on Sinemet and recommended to see a movement disorder clinic.  She is seen ambulating with a walker    Acute back pain.  She has a history of acute on chronic pain in her back neck and legs..  Patient has been opioid dependent and it was reviewed that on her imaging she does have multilevel degenerative changes  Pain clinic saw her and recommended weaning her off her narcotics and trying Tylenol diclofenac gel and topical Lidoderm patch.  Also initiated on gabapentin .    Severe orthostatic hypotension which is a long-standing concern.  She has had labile blood pressures in the TCU as well as in the hospital    Seen by cardiology and taken off her fludrocortisone and given a low-dose of spironolactone at 12.5 daily.  It seems she has a significant orthostatic blood pressure drop of greater than 50 systolic blood pressures.  She dropped from 180-90 this morning.  Subsequently she did rebound 110 so it is high in the morning and subsequently very low at nighttime and the rest of the day.  She is not going to be compliant with Tubigrip's or teds or any other compression hose.  Plan is to continue spironolactone DC her amlodipine and hold losartan for systolic blood pressure less than 160 and monitor her response.  Blood pressures continued to fluctuate widely    Sinus bradycardia with heart rates down to the 50s and 40s  Patient had some workup done and her cortisol level was 10 with no evidence of adrenal  insufficiency  Buford to be medication induced most likely from metoprolol which was discontinued .  Advised to follow-up with cardiology as an outpatient.  She remains asymptomatic.    Hypothyroidism on replacement Synthroid    Mood disorder with significant anxiety being reported by patient with repetitive concerns   Discharge on a higher dose of Celexa 30 mg melatonin added for insomnia concerns    Chronic constipation /IBS-continue with his stool regimen  Add senna as to her regimen on a scheduled basis now.    GERD on a PPI    History of insomnia on melatonin .    Abdominal pain primarily in the right lower quadrant unclear etiology -  GI referral given for EGD  No improvement noted    Cognitive impairment with confusion noted in the hospital.  Patient will take her evening meds as per the hospital and then repeat the medications again and her confusion she would repeat doses of losartan metoprolol Prilosec and spironolactone so it is felt that she is no longer safe to self administer medication and they recommended a home care RN to go home with her  We will await her cognitive testing and medication safety evaluation.    History of AAA status post bifurcated stent graft repair her last CTA done on 4/2016 was stable  Osteoporosis she gets Boniva but is now on wanting to discontinue the medication. she is also on calcium supplement.    Vitamin D deficiency on supplementation  Debilitation-quite profound she is here for PT OT and rehab requesting a DNR/DNI CODE STATUS  She is awaiting placement in assisted living facility and probably will discharge with more services and upcoming cardiology as well as appointment to see GI.no improvement.f/u as scheduled and check BP    Electronically signed by: ANNIKA Oconnor  This progress note was completed using Dragon software and there may be grammatical errors.

## 2021-06-19 NOTE — LETTER
Letter by Parag Forrest MD (Ted) at      Author: Parag Forrest MD (Ted) Service: -- Author Type: --    Filed:  Encounter Date: 8/27/2019 Status: (Other)         Mehreen OBRIEN Reynarichhorace  [address]  [facility] [address]      August 27, 2019      Dear Mehreen,    This letter is to remind you that you will be due for your follow up appointment with Dr. Hector Forrest . To help ensure you are in the best health possible, a regular follow-up with your cardiologist is essential.     Please call our Patient Scheduling Line at [phone] to schedule your appointment at your earliest convenience.  If you have recently scheduled an appointment, please disregard this letter.    We look forward to seeing you again. As always, we are available at the number  above for any questions or concerns you may have.      Sincerely,     The Physicians and Staff of [facility]

## 2021-06-19 NOTE — PROGRESS NOTES
[facility]      Code Status:  DNR/DNI/requesting selective treatment and comfort focused treatments only  Visit Type: Review Of Multiple Medical Conditions     Facility:  [facility] [[id number]]           History of Present Illness: Mehreen Camara is a 78 y.o. female who is currently admitted to the TCU as a transfer from the hospital.  As per the history this is a elderly patient who lives in independent living facility with the past medical history significant for severe orthostatic hypotension and chronic back and SI joint pain with venous stasis and AAA status post stent with moderate aortic stenoses ; hypothyroidism;  hyperlipidemia.  She presented with increasing leg weakness   She was recently in the TCU with bilateral lower extremity weakness and was discharged back and now is back with similar symptoms again the hospital.  She has had extensive neurological workup in May 2018 with no finding.  She had MRI brain and cervical spine imaging which did show multilevel cervical spine degenerative changes but no change otherwise  She was seen by neurology and they have initiated Sinemet for presumed Parkinson's and recommended PT OT.  they recommended she see a movement disorder clinic  She also has a history of orthostatic hypotension with labile hypertension.  She was experiencing bradycardia in the hospital associated with hypotension.  She was on beta-blockers.  Workup was done in the hospital there was no evidence of adrenal insufficiency.  On telemetry she did have sinus bradycardia with heart rates dropping down to the 40s her metoprolol was discontinued and amlodipine has been prescribed for blood pressure management at a low-dose of 2.5 mg.  She continues with her losartan and spironolactone and wants to see cardiology.  Patient is requesting no change in her cardiac meds without consultation with her cardiologist in the TCU.  She continues to exhibit  significant anxiety with adjustment disorder and her Celexa dosage was increased to 30 mg . melatonin was added.  She also has a chronic history of neck back abdominal pain and is narcotic dependent she got multiple doses of oxycodone in the hospital  Pain management was consulted and her opiates have been discontinued they recommended supportive treatment with lidocaine patch diclofenac gel and Tylenol.  She continues to complain of severe back pain .  She is having some abdominal distress ongoing with constipation difficulty urinating and wondering if she should have an EGD.  In addition her blood pressures have been dropping and she is wondering if we need to do something about it. nursing did hold all her medications due to that      Past Medical History:   Diagnosis Date     AAA (abdominal aortic aneurysm) (H)      Acute encephalopathy 9/26/2015     Adrenal adenoma      Harris esophagus      Constipation      Depression      GERD (gastroesophageal reflux disease)      Hiatal hernia      HLD (hyperlipidemia)      HTN (hypertension)      Hypothyroid      Murmur      Osteoarthritis      Retinal Migraine Headache      S/P AAA repair using bifurcation graft 11/30/2015     Scoliosis      Thyroid nodule     removed nodules and thyroid     Past Surgical History:   Procedure Laterality Date     JOINT REPLACEMENT       KS ARTHRODESIS ANT INTERBODY MIN DISCECTOMY, CERVICAL BELOW C2      Description: Cervical Vertebral Fusion;  Recorded: 01/21/2013;  Comments: 1981     KS REVISE MEDIAN N/CARPAL TUNNEL SURG      Description: Neuroplasty Decompression Median Nerve At Carpal Tunnel;  Recorded: 01/21/2013;  Comments: bilateral     KS THYROIDECTOMY      Description: Thyroid Surgery Total Thyroidectomy;  Recorded: 06/08/2011;     KS TOTAL ABDOM HYSTERECTOMY  1985    Description: Hysterectomy;  Recorded: 01/21/2013;     KS TOTAL HIP ARTHROPLASTY Right     Description: Total Hip Replacement;  Recorded: 01/21/2013;  Comments:  right, 2003     Family History   Problem Relation Age of Onset     Heart disease Mother      Hypertension Mother      Heart disease Father      Cancer Brother 57     colon     Hypertension Daughter      Hypertension Daughter      Neuropathy Daughter      Lupus Daughter      Heart disease Paternal Aunt      Heart disease Paternal Uncle      Parkinsonism Paternal Uncle      Social History     Social History     Marital status:      Spouse name: N/A     Number of children: N/A     Years of education: N/A     Occupational History     Not on file.     Social History Main Topics     Smoking status: Former Smoker     Types: Cigarettes     Quit date: 12/11/1994     Smokeless tobacco: Never Used     Alcohol use No     Drug use: No     Sexual activity: Not on file     Other Topics Concern     Not on file     Social History Narrative     Current Outpatient Prescriptions   Medication Sig Dispense Refill     acetaminophen (TYLENOL) 500 MG tablet Take 2 tablets (1,000 mg total) by mouth 3 (three) times a day. (Patient taking differently: Take 1,000 mg by mouth 3 (three) times a day. )  0     aluminum-magnesium hydroxide 200-200 mg/5 mL suspension Take 10 mL by mouth every 6 (six) hours as needed for indigestion. (Patient taking differently: Take 5 mL by mouth every 4 (four) hours as needed for indigestion. ) 200 mL 1     amLODIPine (NORVASC) 2.5 MG tablet Take 1 tablet (2.5 mg total) by mouth daily. Hold for sbp<110  0     bisacodyl (DULCOLAX) 10 mg suppository Insert 10 mg into the rectum daily as needed.       CALCIUM CITRATE ORAL Take 500 mg by mouth daily.       carbidopa-levodopa (SINEMET)  mg per tablet Take 2 tablets by mouth 3 (three) times a day. (Patient taking differently: Take 2 tablets by mouth 3 (three) times a day. Give 200 mg  by mouth three times a day related to WEAKNESS)  0     cholecalciferol, vitamin D3, 1,000 unit tablet Take 2,000 Units by mouth daily.        citalopram (CELEXA) 20 MG tablet  Take 1.5 tablets (30 mg total) by mouth Daily after lunch.  0     diclofenac sodium (VOLTAREN) 1 % Gel Lower extremities: 4 g to the affected area 4 times daily. Upper extremities: 2 g to the affected area 4 times daily. Do not exceed 32 g total dose per day. (Patient taking differently: 2 g 4 (four) times a day. Diclofenac Sodium Gel 1 % Apply 2 gram  transdermally four times a day for Pain Apply to upper  extremities Do not exceed 32 g total per day)  0     docusate sodium (COLACE) 100 MG capsule Take 1 capsule (100 mg total) by mouth 2 (two) times a day as needed for constipation. 30 capsule 0     gabapentin (NEURONTIN) 100 MG capsule Take 100 mg by mouth 2 (two) times a day.       gabapentin (NEURONTIN) 100 MG capsule Take 200 mg by mouth at bedtime.       ibandronate (BONIVA) 150 mg tablet Take 150 mg by mouth every 30 (thirty) days. Give  150 mg by mouth one time a day every 1 month(s)  starting on the 20th for 28 day(s) related to AGERELATED  OSTEOPOROSIS WITHOUT CURRENT  PATHOLOGICAL FRACTURE (M81.0) Start on the  20th and end on the 20th every month. Take on  empty stomach, 30 minutes before breakfast with full  glass of water and must remain sitting up for 30  minutes       levothyroxine (SYNTHROID, LEVOTHROID) 137 MCG tablet Take 137 mcg by mouth daily.        lidocaine 4 % patch Place 1 patch on the skin daily. Remove and discard patch with 12 hours or as directed by MD.       losartan (COZAAR) 50 MG tablet Take 50 mg by mouth 2 (two) times a day.        MAGNESIUM HYDROXIDE, BULK, MISC Use 30 mL As Directed. Milk of Magnesia Suspension 1200 MG/15ML  (Magnesium Hydroxide) Give 30 ml by mouth as  needed for Constipation       melatonin 3 mg Tab tablet Take 3 mg by mouth at bedtime as needed.        multivitamin (MULTIPLE VITAMIN ORAL) Take 1 tablet by mouth daily.       nystatin (MYCOSTATIN) powder Apply topically 3 (three) times a day. Apply to Affected area topically three  times a day for Yeast        omeprazole (PRILOSEC) 20 MG capsule Take 20 mg by mouth 2 (two) times a day before meals.        peg 400-propylene glycol (SYSTANE) 0.4-0.3 % Drop Administer 1 drop to both eyes 4 (four) times a day as needed.       polyethylene glycol (MIRALAX) 17 gram packet Take 17 g by mouth daily.       simethicone (MYLICON) 80 MG chewable tablet Chew 80 mg 4 (four) times a day as needed for flatulence. Simethicone Tablet 80 MG Give 80 mg by mouth as  needed for Abdominal Distention 4 times daily after  meals and HS as needed       spironolactone (ALDACTONE) 25 MG tablet Take 0.5 tablets (12.5 mg total) by mouth daily. (Patient taking differently: Take 12.5 mg by mouth 2 (two) times a day. ) 30 tablet 1     sucralfate (CARAFATE) 100 mg/mL suspension Take 1 g by mouth 2 (two) times a day.       triamcinolone (KENALOG) 0.1 % cream Apply thin layer to feet up to twice daily as needed (Patient taking differently: 2 (two) times a day as needed. Apply thin layer to feet up to twice daily as needed) 30 g 0     No current facility-administered medications for this visit.      Allergies   Allergen Reactions     Penicillins Anaphylaxis, Shortness Of Breath and Rash     Aspirin Nausea Only     Atenolol Cough     Atorvastatin Myalgia     Bupropion Nausea Only     Cleocin [Clindamycin Hcl] Other (See Comments)     Constipation      Codeine Nausea Only     Ibuprofen Nausea Only     Stomach upset     Lovastatin Myalgia     Cephalexin Rash     Neck reddness     Review of Systems:    Constitutional: Negative.  Negative for fever, chills,has  activity change, appetite change and fatigue.   HENT: Negative for congestion and facial swelling.    Eyes: Negative for photophobia, redness and visual disturbance.   Respiratory: Negative for cough and chest tightness.    Cardiovascular: Negative for chest pain, palpitations and HAS leg swelling.   Gastrointestinal: Negative for nausea, diarrhea, she has chronic constipation,no blood in stool and  abdominal distention.   Complaining of abdominal bloating  She is now complaining of right lower quadrant abdominal pain with cramping .  Genitourinary: Negative.  Repeatedly requesting that she be toileted for urinary frequency  Musculoskeletal: Negative.    Complaining of low back pain and using an ice pack  Skin: Negative.    Neurological: Negative for dizziness, tremors, syncope, weakness, light-headedness and headaches.   Hematological: Does not bruise/bleed easily.   Psychiatric/Behavioral: Negative.  Remains anxious    Vitals:    07/24/18 1052   BP: 98/64   Pulse: 60   Temp: 98  F (36.7  C)       Physical Exam:    GENERAL: no acute distress. Cooperative in conversation.   HEENT: pupils are equal, round and reactive. Oral mucosa is moist and intact.  RESP:Chest symmetric. Regular respiratory rate. No stridor.  CVS: S1S2; she has an ejection murmur  ABD: Nondistended, soft.  No rebound no guarding but tender to deep palpation in the right lower quadrant  EXTREMITIES: Trace lower extremity edema.  She has limited abduction in her left shoulder due to an old injury.  Right shoulder has good range of movement limited power and strength noted in the bilateral lower extremities  NEURO: non focal. Alert and oriented x3.   PSYCH: within normal limits. No depression ;has anxiety.  SKIN: warm dry intact     Labs:      Results for orders placed or performed during the hospital encounter of 07/04/18   Basic metabolic panel   Result Value Ref Range    Sodium 138 136 - 145 mmol/L    Potassium 4.4 3.5 - 5.0 mmol/L    Chloride 102 98 - 107 mmol/L    CO2 30 22 - 31 mmol/L    Anion Gap, Calculation 6 5 - 18 mmol/L    Glucose 104 70 - 125 mg/dL    Calcium 8.9 8.5 - 10.5 mg/dL    BUN 11 8 - 28 mg/dL    Creatinine 0.59 (L) 0.60 - 1.10 mg/dL    GFR MDRD Af Amer >60 >60 mL/min/1.73m2    GFR MDRD Non Af Amer >60 >60 mL/min/1.73m2       Assessment/Plan:    Bilateral leg weakness s She has a history of repetitive bilateral lower  extremity weakness  Associated with that she had severe low back pain.  Neurology suspect she has some neurodegenerative disorder not ruling out Parkinson's welt versus multisystem atrophy.  Repeat imaging included MRI brain and C-spine did not show any new changes she does have multilevel C-spine degenerative changes but no progress since 4/2018  Discharge for PT OT and rehab with outpatient follow-up with neurology.  Ambulating currently using a walker  She has been initiated on Sinemet and recommended to see a movement disorder clinic    Acute back pain.  She has a history of acute on chronic pain in her back neck and legs..  Patient has been opioid dependent and it was reviewed that on her imaging she does have multilevel degenerative changes  Pain clinic saw her and recommended weaning her off her narcotics and trying Tylenol diclofenac gel and topical Lidoderm patch.  Also initiated on gabapentin and patient is not very happy with outcome    Severe orthostatic hypotension which is a long-standing concern.  She has had labile blood pressures in the TCU as well as in the hospital    Seen by cardiology and taken off her fludrocortisone and given a low-dose of spironolactone at 12.5 daily.  bp were low today and meds held  Plan is to hold losartan as well as hold her amlodipine for systolic blood pressures which are low and monitor response    Sinus bradycardia with heart rates down to the 50s and 40s  Patient had some workup done and her cortisol level was 10 with no evidence of adrenal insufficiency  Felt to be medication induced most likely from metoprolol which was discontinued and amlodipine 2.5 mg daily has been prescribed she continues with losartan and spironolactone.  Advised to follow-up with cardiology as an outpatient.  She remains asymptomatic.    Hypothyroidism on replacement Synthroid    Mood disorder with significant anxiety being reported by patient with repetitive concerns   Discharge on a higher  dose of Celexa 30 mg melatonin added for insomnia concerns    Chronic constipation /IBS-continue with his stool regimen  Add senna as to her regimen on a scheduled basis now.    GERD on a PPI    History of insomnia on melatonin .    Abdominal pain primarily in the right lower quadrant unclear etiology -I have advised her to see GI she is on Carafate with no improvement along with PPI twice a day also get simethicone quite often  Difficulty with urination I am  asking nursing to check PVRs on her    Cognitive impairment with confusion noted in the hospital.  Patient will take her evening meds as per the hospital and then repeat the medications again and her confusion she would repeat doses of losartan metoprolol Prilosec and spironolactone so it is felt that she is no longer safe to self administer medication and they recommended a home care RN to go home with her  We will await her cognitive testing and medication safety evaluation.    History of AAA status post bifurcated stent graft repair her last CTA done on 4/2016 was stable  Osteoporosis she gets Boniva but is now on wanting to discontinue the medication. she is also on calcium supplement.    GERD she takes Carafate twice a day.also has a PPI  Vitamin D deficiency on supplementation  Debilitation-quite profound she is here for PT OT and rehab requesting a DNR/DNI CODE STATUS  She remains emotionally labile she has been requesting that none of her cardiac meds be adjusted.  She does not want any additional meds added to her regimen so we will respect that.  Walking in PT.  Advised to follow-up with GI if she is concerns about abdominal pain and ongoing burning discomfort in spite of being on optimal dose of both Carafate and PPIs.  Monitor response to stool softeners with constipation as well as PVRs  Total time spent was 35 minutes, more than half of it was in face-to-face counseling regarding disease state, treatment, side effects, documentation, review of  clinical data and coordination of care    Electronically signed by: ANNIKA Oconnor  This progress note was completed using Dragon software and there may be grammatical errors.

## 2021-06-19 NOTE — PROGRESS NOTES
[facility]      Code Status:  DNR/DNI/requesting selective treatment and comfort focused treatments only  Visit Type: H & P     Facility:  [facility] [[id number]]           History of Present Illness: Mehreen Camara is a 78 y.o. female who is currently admitted to the TCU as a transfer from the hospital.  As per the history this is a elderly patient who lives in independent living facility with the past medical history significant for severe orthostatic hypotension and chronic back and SI joint pain with venous stasis and AAA status post stent with moderate aortic stenoses ; hypothyroidism;  hyperlipidemia.  She presented with increasing leg weakness   She was recently in the TCU with bilateral lower extremity weakness and was discharged back and now is back with similar symptoms again the hospital.  She has had extensive neurological workup in May 2018 with no finding.  She had MRI brain and cervical spine imaging which did show multilevel cervical spine degenerative changes but no change otherwise  She was seen by neurology and they have initiated Sinemet for presumed Parkinson's and recommended PT OT they recommended she see a movement disorder clinic  She also has a history of orthostatic hypotension with labile hypertension.  She was experiencing bradycardia in the hospital associated with hypotension.  She was on beta-blockers.  Workup was done in the hospital there was no evidence of adrenal insufficiency.  On telemetry she did have sinus bradycardia with heart rates dropping down to the 40s her metoprolol was discontinued and amlodipine has been prescribed for blood pressure management at a low-dose of 2.5 mg.  She continues with her losartan and spironolactone and wants to see cardiology.  Patient is requesting no change in her cardiac meds without consultation with her cardiologist in the TCU.  She continues to exhibit significant anxiety with adjustment  disorder and her Celexa dosage was increased to 30 mg melatonin was added.  She also has a chronic history of neck back abdominal pain and is narcotic dependent she got multiple doses of oxycodone in the hospital  Pain management was consulted and her opiates have been discontinued they recommended supportive treatment with lidocaine patch diclofenac gel and Tylenol.  She continues to complain of severe back pain right now  Patient and family are requesting an update in the changes in medication.  There was also some concern about medication compliance and confusion noted in the hospital especially regarding medication.  It was being suspected that she was baby taking the same medication multiple times in her confusion and they recommended when she goes home she may need a home RN to set up her medications for her    Past Medical History:   Diagnosis Date     AAA (abdominal aortic aneurysm) (H)      Acute encephalopathy 9/26/2015     Adrenal adenoma      Harris esophagus      Constipation      Depression      GERD (gastroesophageal reflux disease)      Hiatal hernia      HLD (hyperlipidemia)      HTN (hypertension)      Hypothyroid      Murmur      Osteoarthritis      Retinal Migraine Headache      S/P AAA repair using bifurcation graft 11/30/2015     Scoliosis      Thyroid nodule     removed nodules and thyroid     Past Surgical History:   Procedure Laterality Date     JOINT REPLACEMENT       MA ARTHRODESIS ANT INTERBODY MIN DISCECTOMY, CERVICAL BELOW C2      Description: Cervical Vertebral Fusion;  Recorded: 01/21/2013;  Comments: 1981     MA REVISE MEDIAN N/CARPAL TUNNEL SURG      Description: Neuroplasty Decompression Median Nerve At Carpal Tunnel;  Recorded: 01/21/2013;  Comments: bilateral     MA THYROIDECTOMY      Description: Thyroid Surgery Total Thyroidectomy;  Recorded: 06/08/2011;     MA TOTAL ABDOM HYSTERECTOMY  1985    Description: Hysterectomy;  Recorded: 01/21/2013;     MA TOTAL HIP ARTHROPLASTY  Right     Description: Total Hip Replacement;  Recorded: 01/21/2013;  Comments: right, 2003     Family History   Problem Relation Age of Onset     Heart disease Mother      Hypertension Mother      Heart disease Father      Cancer Brother 57     colon     Hypertension Daughter      Hypertension Daughter      Neuropathy Daughter      Lupus Daughter      Heart disease Paternal Aunt      Heart disease Paternal Uncle      Parkinsonism Paternal Uncle      Social History     Social History     Marital status:      Spouse name: N/A     Number of children: N/A     Years of education: N/A     Occupational History     Not on file.     Social History Main Topics     Smoking status: Former Smoker     Types: Cigarettes     Quit date: 12/11/1994     Smokeless tobacco: Never Used     Alcohol use No     Drug use: No     Sexual activity: Not on file     Other Topics Concern     Not on file     Social History Narrative     Current Outpatient Prescriptions   Medication Sig Dispense Refill     acetaminophen (TYLENOL) 500 MG tablet Take 2 tablets (1,000 mg total) by mouth 3 (three) times a day. (Patient taking differently: Take 1,000 mg by mouth 3 (three) times a day. )  0     aluminum-magnesium hydroxide 200-200 mg/5 mL suspension Take 10 mL by mouth every 6 (six) hours as needed for indigestion. (Patient taking differently: Take 5 mL by mouth every 4 (four) hours as needed for indigestion. ) 200 mL 1     amLODIPine (NORVASC) 2.5 MG tablet Take 1 tablet (2.5 mg total) by mouth daily. Hold for sbp<110  0     bisacodyl (DULCOLAX) 10 mg suppository Insert 10 mg into the rectum daily as needed.       CALCIUM CITRATE ORAL Take 500 mg by mouth daily.       carbidopa-levodopa (SINEMET)  mg per tablet Take 2 tablets by mouth 3 (three) times a day. (Patient taking differently: Take 2 tablets by mouth 3 (three) times a day. Give 200 mg  by mouth three times a day related to WEAKNESS)  0     cholecalciferol, vitamin D3, 1,000 unit  tablet Take 2,000 Units by mouth daily.        citalopram (CELEXA) 20 MG tablet Take 1.5 tablets (30 mg total) by mouth Daily after lunch.  0     diclofenac sodium (VOLTAREN) 1 % Gel Lower extremities: 4 g to the affected area 4 times daily. Upper extremities: 2 g to the affected area 4 times daily. Do not exceed 32 g total dose per day. (Patient taking differently: 2 g 4 (four) times a day. Diclofenac Sodium Gel 1 % Apply 2 gram  transdermally four times a day for Pain Apply to upper  extremities Do not exceed 32 g total per day)  0     docusate sodium (COLACE) 100 MG capsule Take 1 capsule (100 mg total) by mouth 2 (two) times a day as needed for constipation. 30 capsule 0     gabapentin (NEURONTIN) 100 MG capsule Take 100 mg by mouth 2 (two) times a day.       gabapentin (NEURONTIN) 100 MG capsule Take 200 mg by mouth at bedtime.       ibandronate (BONIVA) 150 mg tablet Take 150 mg by mouth every 30 (thirty) days. Give  150 mg by mouth one time a day every 1 month(s)  starting on the 20th for 28 day(s) related to AGERELATED  OSTEOPOROSIS WITHOUT CURRENT  PATHOLOGICAL FRACTURE (M81.0) Start on the  20th and end on the 20th every month. Take on  empty stomach, 30 minutes before breakfast with full  glass of water and must remain sitting up for 30  minutes       levothyroxine (SYNTHROID, LEVOTHROID) 137 MCG tablet Take 137 mcg by mouth daily.        lidocaine 4 % patch Place 1 patch on the skin daily. Remove and discard patch with 12 hours or as directed by MD.       losartan (COZAAR) 50 MG tablet Take 50 mg by mouth 2 (two) times a day.        MAGNESIUM HYDROXIDE, BULK, MISC Use 30 mL As Directed. Milk of Magnesia Suspension 1200 MG/15ML  (Magnesium Hydroxide) Give 30 ml by mouth as  needed for Constipation       melatonin 3 mg Tab tablet Take 3 mg by mouth at bedtime as needed.        multivitamin (MULTIPLE VITAMIN ORAL) Take 1 tablet by mouth daily.       nystatin (MYCOSTATIN) powder Apply topically 3 (three)  times a day. Apply to Affected area topically three  times a day for Yeast       omeprazole (PRILOSEC) 20 MG capsule Take 20 mg by mouth 2 (two) times a day before meals.        peg 400-propylene glycol (SYSTANE) 0.4-0.3 % Drop Administer 1 drop to both eyes 4 (four) times a day as needed.       polyethylene glycol (MIRALAX) 17 gram packet Take 17 g by mouth daily.       simethicone (MYLICON) 80 MG chewable tablet Chew 80 mg 4 (four) times a day as needed for flatulence. Simethicone Tablet 80 MG Give 80 mg by mouth as  needed for Abdominal Distention 4 times daily after  meals and HS as needed       spironolactone (ALDACTONE) 25 MG tablet Take 0.5 tablets (12.5 mg total) by mouth daily. (Patient taking differently: Take 12.5 mg by mouth 2 (two) times a day. ) 30 tablet 1     sucralfate (CARAFATE) 100 mg/mL suspension Take 1 g by mouth 2 (two) times a day.       triamcinolone (KENALOG) 0.1 % cream Apply thin layer to feet up to twice daily as needed (Patient taking differently: 2 (two) times a day as needed. Apply thin layer to feet up to twice daily as needed) 30 g 0     No current facility-administered medications for this visit.      Allergies   Allergen Reactions     Penicillins Anaphylaxis, Shortness Of Breath and Rash     Aspirin Nausea Only     Atenolol Cough     Atorvastatin Myalgia     Bupropion Nausea Only     Cleocin [Clindamycin Hcl] Other (See Comments)     Constipation      Codeine Nausea Only     Ibuprofen Nausea Only     Stomach upset     Lovastatin Myalgia     Cephalexin Rash     Neck reddness     Review of Systems:    Constitutional: Negative.  Negative for fever, chills,has  activity change, appetite change and fatigue.   HENT: Negative for congestion and facial swelling.    Eyes: Negative for photophobia, redness and visual disturbance.   Respiratory: Negative for cough and chest tightness.    Cardiovascular: Negative for chest pain, palpitations and HAS leg swelling.   Gastrointestinal: Negative  for nausea, diarrhea, she has chronic constipation,no blood in stool and abdominal distention.   Complaining of abdominal bloating  She is now complaining of right lower quadrant abdominal pain with cramping .  Genitourinary: Negative.  Repeatedly requesting that she be toileted for urinary frequency  Musculoskeletal: Negative.    Complaining of low back pain and using an ice pack  Skin: Negative.    Neurological: Negative for dizziness, tremors, syncope, weakness, light-headedness and headaches.   Hematological: Does not bruise/bleed easily.   Psychiatric/Behavioral: Negative.  Remains anxious    Vitals:    07/19/18 1042   BP: 114/78   Pulse: 82   Temp: 98  F (36.7  C)       Physical Exam:    GENERAL: no acute distress. Cooperative in conversation.   HEENT: pupils are equal, round and reactive. Oral mucosa is moist and intact.  RESP:Chest symmetric. Regular respiratory rate. No stridor.  CVS: S1S2; she has an ejection murmur  ABD: Nondistended, soft.  No rebound no guarding but tender to deep palpation in the right lower quadrant  EXTREMITIES: Trace lower extremity edema.  She has limited abduction in her left shoulder due to an old injury.  Right shoulder has good range of movement limited power and strength noted in the bilateral lower extremities  NEURO: non focal. Alert and oriented x3.   PSYCH: within normal limits. No depression ;has anxiety.  SKIN: warm dry intact     Labs:    Mr Brain With Without Contrast    Result Date: 7/5/2018  HEAD MRI WITHOUT AND WITH IV CONTRAST 7/5/2018 3:46 PM INDICATION: Headache, motor freezing episodes eval for signs of multi system atrophy TECHNIQUE: Head MRI without and with intravenous contrast. Cervical spine MRI without IV contrast. CONTRAST: Gadavist 7ml COMPARISON: MRI brain 11/18/2015, MRI cervical spine 4/30/2018 FINDINGS: MRI BRAIN: Overall unchanged periventricular and pontine FLAIR hyperintensity when compared to 11/18/2015. Unchanged to slight interval progression  of diffuse mild parenchymal volume loss with ex vacuo ventricular dilatation. No abnormal postcontrast enhancement. No restricted diffusion to suggest acute parenchymal infarct. The midline structures and craniocervical junction are normal in appearance. Expected flow voids are present. Bilateral lens replacement. The paranasal sinuses and mastoid air cells are well aerated within the visualized portions. MRI CERVICAL SPINE: Normal vertebral body heights. Unchanged reversal of the normal cervical lordosis. Unchanged appearance of fusion across the C5-C7 disc space. No suspicious bone marrow signal abnormality. Unchanged focal cord signal abnormality at the C5 level. Normal appearance of the craniocervical junction and C1-C2. The visualized intracranial contents are unremarkable. Grossly normal paraspinal soft tissues. The vertebral artery flow voids are preserved. C2-C3: Normal disc height. Unchanged small central disc osteophyte complex. Unchanged mild to moderate bilateral facet arthropathy. No spinal canal stenosis. No right neural foraminal stenosis. No left neural foraminal stenosis. C3-C4: Unchanged mild loss of disc height and central disc osteophyte complex. Unchanged moderate bilateral facet arthropathy. Unchanged mild spinal canal stenosis. Unchanged moderate right neural foraminal stenosis. Unchanged severe left neural foraminal stenosis. C4-C5: Unchanged severe loss of disc height and central disc osteophyte complex. Unchanged mild bilateral facet arthropathy. Unchanged mild spinal canal stenosis. Unchanged severe right neural foraminal stenosis. Unchanged mild left neural foraminal stenosis.  C5-C6: Fusion across the disc space. Unchanged mild right and moderate left facet arthropathy. No spinal canal stenosis. No right neural foraminal stenosis. No left neural foraminal stenosis. C6-C7: Fusion across the disc space. Unchanged mild bilateral facet arthropathy. Unchanged mild spinal canal stenosis.  Unchanged moderate right neural foraminal stenosis. Unchanged mild left neural foraminal stenosis. C7-T1: Unchanged mild loss of disc height and central disc osteophyte complex. Unchanged mild bilateral facet arthropathy. Unchanged mild spinal canal stenosis. No right neural foraminal stenosis. No left neural foraminal stenosis.     CONCLUSION: MRI BRAIN: 1. No acute intracranial abnormality. 2. Slight interval progression of mild diffuse parenchymal volume loss with ex vacuo ventricular dilatation when compared to 11/18/2015. MRI CERVICAL SPINE: 1. Multilevel degenerative changes, as detailed above, not significantly progressed since 4/30/2018.    Mr Cervical Spine Without Contrast    Result Date: 7/5/2018  HEAD MRI WITHOUT AND WITH IV CONTRAST 7/5/2018 3:46 PM INDICATION: Headache, motor freezing episodes eval for signs of multi system atrophy TECHNIQUE: Head MRI without and with intravenous contrast. Cervical spine MRI without IV contrast. CONTRAST: Gadavist 7ml COMPARISON: MRI brain 11/18/2015, MRI cervical spine 4/30/2018 FINDINGS: MRI BRAIN: Overall unchanged periventricular and pontine FLAIR hyperintensity when compared to 11/18/2015. Unchanged to slight interval progression of diffuse mild parenchymal volume loss with ex vacuo ventricular dilatation. No abnormal postcontrast enhancement. No restricted diffusion to suggest acute parenchymal infarct. The midline structures and craniocervical junction are normal in appearance. Expected flow voids are present. Bilateral lens replacement. The paranasal sinuses and mastoid air cells are well aerated within the visualized portions. MRI CERVICAL SPINE: Normal vertebral body heights. Unchanged reversal of the normal cervical lordosis. Unchanged appearance of fusion across the C5-C7 disc space. No suspicious bone marrow signal abnormality. Unchanged focal cord signal abnormality at the C5 level. Normal appearance of the craniocervical junction and C1-C2. The  visualized intracranial contents are unremarkable. Grossly normal paraspinal soft tissues. The vertebral artery flow voids are preserved. C2-C3: Normal disc height. Unchanged small central disc osteophyte complex. Unchanged mild to moderate bilateral facet arthropathy. No spinal canal stenosis. No right neural foraminal stenosis. No left neural foraminal stenosis. C3-C4: Unchanged mild loss of disc height and central disc osteophyte complex. Unchanged moderate bilateral facet arthropathy. Unchanged mild spinal canal stenosis. Unchanged moderate right neural foraminal stenosis. Unchanged severe left neural foraminal stenosis. C4-C5: Unchanged severe loss of disc height and central disc osteophyte complex. Unchanged mild bilateral facet arthropathy. Unchanged mild spinal canal stenosis. Unchanged severe right neural foraminal stenosis. Unchanged mild left neural foraminal stenosis.  C5-C6: Fusion across the disc space. Unchanged mild right and moderate left facet arthropathy. No spinal canal stenosis. No right neural foraminal stenosis. No left neural foraminal stenosis. C6-C7: Fusion across the disc space. Unchanged mild bilateral facet arthropathy. Unchanged mild spinal canal stenosis. Unchanged moderate right neural foraminal stenosis. Unchanged mild left neural foraminal stenosis. C7-T1: Unchanged mild loss of disc height and central disc osteophyte complex. Unchanged mild bilateral facet arthropathy. Unchanged mild spinal canal stenosis. No right neural foraminal stenosis. No left neural foraminal stenosis.     CONCLUSION: MRI BRAIN: 1. No acute intracranial abnormality. 2. Slight interval progression of mild diffuse parenchymal volume loss with ex vacuo ventricular dilatation when compared to 11/18/2015. MRI CERVICAL SPINE: 1. Multilevel degenerative changes, as detailed above, not significantly progressed since 4/30/2018.  Results for orders placed or performed during the hospital encounter of 07/04/18   Basic  metabolic panel   Result Value Ref Range    Sodium 138 136 - 145 mmol/L    Potassium 4.4 3.5 - 5.0 mmol/L    Chloride 102 98 - 107 mmol/L    CO2 30 22 - 31 mmol/L    Anion Gap, Calculation 6 5 - 18 mmol/L    Glucose 104 70 - 125 mg/dL    Calcium 8.9 8.5 - 10.5 mg/dL    BUN 11 8 - 28 mg/dL    Creatinine 0.59 (L) 0.60 - 1.10 mg/dL    GFR MDRD Af Amer >60 >60 mL/min/1.73m2    GFR MDRD Non Af Amer >60 >60 mL/min/1.73m2       Assessment/Plan:    Bilateral leg weakness s She has a history of repetitive bilateral lower extremity weakness  Associated with that she had severe low back pain.  Neurology suspect she has some neurodegenerative disorder not ruling out Parkinson's welt versus multisystem atrophy.  Repeat imaging included MRI brain and C-spine did not show any new changes she does have multilevel C-spine degenerative changes but no progress since 4/2018  Discharge for PT OT and rehab with outpatient follow-up with neurology.  Ambulating currently using a walker  She has been initiated on Sinemet and recommended to see a movement disorder clinic    Acute back pain.  She has a history of acute on chronic pain in her back neck and legs..  Patient has been opioid dependent and it was reviewed that on her imaging she does have multilevel degenerative changes  Pain clinic saw her and recommended weaning her off her narcotics and trying Tylenol diclofenac gel and topical Lidoderm patch.  Also initiated on gabapentin and patient is not very happy with outcome    Severe orthostatic hypotension which is a long-standing concern.  She has had labile blood pressures in the TCU as well as in the hospital    Seen by cardiology and taken off her fludrocortisone and given a low-dose of spironolactone at 12.5 daily.  bp were low today and meds held    Sinus bradycardia with heart rates down to the 50s and 40s  Patient had some workup done and her cortisol level was 10 with no evidence of adrenal insufficiency  Felt to be medication  induced most likely from metoprolol which was discontinued and amlodipine 2.5 mg daily has been prescribed she continues with losartan and spironolactone.  Advised to follow-up with cardiology as an outpatient.    Hypothyroidism on replacement Synthroid    Mood disorder with significant anxiety being reported by patient with repetitive concerns   Discharge on a higher dose of Celexa 30 mg melatonin added for insomnia concerns    Chronic constipation /IBS-continue with his stool regimen    GERD on a PPI    History of insomnia on melatonin .    Abdominal pain primarily in the right lower quadrant unclear etiology -improved.    Cognitive impairment with confusion noted in the hospital.  Patient will take her evening meds as per the hospital and then repeat the medications again and her confusion she would repeat doses of losartan metoprolol Prilosec and spironolactone so it is felt that she is no longer safe to self administer medication and they recommended a home care RN to go home with her  We will await her cognitive testing and medication safety evaluation.    History of AAA status post bifurcated stent graft repair her last CTA done on 4/2016 was stable  Osteoporosis she gets Boniva but is now on wanting to discontinue the medication. she is also on calcium supplement.    GERD she takes Carafate twice a day.also has a PPI  Vitamin D deficiency on supplementation  Debilitation-quite profound she is here for PT OT and rehab requesting a DNR/DNI CODE STATUS  She remains emotionally labile she has been requesting that none of her cardiac meds be adjusted.  She does not want any additional meds added to her regimen so we will respect that.  Walking in PT.  She wants several of her medication discontinued including no statins no Boniva.  She has a follow-up care conference tomorrow and will review her medication with staff and let us know what she wants to be discontinued.  Total time spent was 45 minutes, more than  half of it was in face-to-face counseling regarding disease state, treatment, side effects, documentation, review of clinical data and coordination of care    Electronically signed by: ANNIKA Oconnor  This progress note was completed using Dragon software and there may be grammatical errors.

## 2021-06-19 NOTE — PROGRESS NOTES
[facility]      Code Status:  DNR/DNI/requesting selective treatment and comfort focused treatments only  Visit Type: Review Of Multiple Medical Conditions     Facility:  [facility] [[id number]]           History of Present Illness: Mehreen Camara is a 78 y.o. female who is currently admitted to the TCU as a transfer from the hospital.  As per the history this is a elderly patient who lives in independent living facility with the past medical history significant for severe orthostatic hypotension and chronic back and SI joint pain with venous stasis and AAA status post stent with moderate aortic stenoses ; hypothyroidism;  hyperlipidemia.  She presented with increasing leg weakness   She was recently in the TCU with bilateral lower extremity weakness and was discharged back and now is back with similar symptoms again the hospital.  She has had extensive neurological workup in May 2018 with no finding.  She had MRI brain and cervical spine imaging which did show multilevel cervical spine degenerative changes but no change otherwise  She was seen by neurology and they have initiated Sinemet for presumed Parkinson's and recommended PT OT.  they recommended she see a movement disorder clinic  She also has a history of orthostatic hypotension with labile hypertension.  She was experiencing bradycardia in the hospital associated with hypotension.  She was on beta-blockers.  Workup was done in the hospital there was no evidence of adrenal insufficiency.  On telemetry she did have sinus bradycardia with heart rates dropping down to the 40s her metoprolol was discontinued and amlodipine has been prescribed for blood pressure management at a low-dose of 2.5 mg.  She continues with her losartan and spironolactone and wants to see cardiology.  Patient is requesting no change in her cardiac meds without consultation with her cardiologist in the TCU.  She continues to exhibit  significant anxiety with adjustment disorder and her Celexa dosage was increased to 30 mg . melatonin was added.  She also has a chronic history of neck back abdominal pain and is narcotic dependent she got multiple doses of oxycodone in the hospital  Pain management was consulted and her opiates have been discontinued they recommended supportive treatment with lidocaine patch diclofenac gel and Tylenol.    Not feeling good this morning she says her blood pressure dropped again and she felt very dizzy after that.  Blood pressure was 114/77 this morning before medications.      Past Medical History:   Diagnosis Date     AAA (abdominal aortic aneurysm) (H)      Acute encephalopathy 9/26/2015     Adrenal adenoma      Harris esophagus      Constipation      Depression      GERD (gastroesophageal reflux disease)      Hiatal hernia      HLD (hyperlipidemia)      HTN (hypertension)      Hypothyroid      Murmur      Osteoarthritis      Retinal Migraine Headache      S/P AAA repair using bifurcation graft 11/30/2015     Scoliosis      Thyroid nodule     removed nodules and thyroid     Past Surgical History:   Procedure Laterality Date     JOINT REPLACEMENT       MT ARTHRODESIS ANT INTERBODY MIN DISCECTOMY, CERVICAL BELOW C2      Description: Cervical Vertebral Fusion;  Recorded: 01/21/2013;  Comments: 1981     MT REVISE MEDIAN N/CARPAL TUNNEL SURG      Description: Neuroplasty Decompression Median Nerve At Carpal Tunnel;  Recorded: 01/21/2013;  Comments: bilateral     MT THYROIDECTOMY      Description: Thyroid Surgery Total Thyroidectomy;  Recorded: 06/08/2011;     MT TOTAL ABDOM HYSTERECTOMY  1985    Description: Hysterectomy;  Recorded: 01/21/2013;     MT TOTAL HIP ARTHROPLASTY Right     Description: Total Hip Replacement;  Recorded: 01/21/2013;  Comments: right, 2003     Family History   Problem Relation Age of Onset     Heart disease Mother      Hypertension Mother      Heart disease Father      Cancer Brother 57      colon     Hypertension Daughter      Hypertension Daughter      Neuropathy Daughter      Lupus Daughter      Heart disease Paternal Aunt      Heart disease Paternal Uncle      Parkinsonism Paternal Uncle      Social History     Social History     Marital status:      Spouse name: N/A     Number of children: N/A     Years of education: N/A     Occupational History     Not on file.     Social History Main Topics     Smoking status: Former Smoker     Types: Cigarettes     Quit date: 12/11/1994     Smokeless tobacco: Never Used     Alcohol use No     Drug use: No     Sexual activity: Not on file     Other Topics Concern     Not on file     Social History Narrative     Current Outpatient Prescriptions   Medication Sig Dispense Refill     acetaminophen (TYLENOL) 500 MG tablet Take 2 tablets (1,000 mg total) by mouth 3 (three) times a day. (Patient taking differently: Take 1,000 mg by mouth 3 (three) times a day. )  0     aluminum-magnesium hydroxide 200-200 mg/5 mL suspension Take 10 mL by mouth every 6 (six) hours as needed for indigestion. (Patient taking differently: Take 5 mL by mouth every 4 (four) hours as needed for indigestion. ) 200 mL 1     amLODIPine (NORVASC) 2.5 MG tablet Take 1 tablet (2.5 mg total) by mouth daily. Hold for sbp<110  0     bisacodyl (DULCOLAX) 10 mg suppository Insert 10 mg into the rectum daily as needed.       CALCIUM CITRATE ORAL Take 500 mg by mouth daily.       carbidopa-levodopa (SINEMET)  mg per tablet Take 2 tablets by mouth 3 (three) times a day. (Patient taking differently: Take 2 tablets by mouth 3 (three) times a day. Give 200 mg  by mouth three times a day related to WEAKNESS)  0     cholecalciferol, vitamin D3, 1,000 unit tablet Take 2,000 Units by mouth daily.        citalopram (CELEXA) 20 MG tablet Take 1.5 tablets (30 mg total) by mouth Daily after lunch.  0     diclofenac sodium (VOLTAREN) 1 % Gel Lower extremities: 4 g to the affected area 4 times daily. Upper  extremities: 2 g to the affected area 4 times daily. Do not exceed 32 g total dose per day. (Patient taking differently: 2 g 4 (four) times a day. Diclofenac Sodium Gel 1 % Apply 2 gram  transdermally four times a day for Pain Apply to upper  extremities Do not exceed 32 g total per day)  0     docusate sodium (COLACE) 100 MG capsule Take 1 capsule (100 mg total) by mouth 2 (two) times a day as needed for constipation. 30 capsule 0     gabapentin (NEURONTIN) 100 MG capsule Take 100 mg by mouth 2 (two) times a day.       gabapentin (NEURONTIN) 100 MG capsule Take 200 mg by mouth at bedtime.       ibandronate (BONIVA) 150 mg tablet Take 150 mg by mouth every 30 (thirty) days. Give  150 mg by mouth one time a day every 1 month(s)  starting on the 20th for 28 day(s) related to AGERELATED  OSTEOPOROSIS WITHOUT CURRENT  PATHOLOGICAL FRACTURE (M81.0) Start on the  20th and end on the 20th every month. Take on  empty stomach, 30 minutes before breakfast with full  glass of water and must remain sitting up for 30  minutes       levothyroxine (SYNTHROID, LEVOTHROID) 137 MCG tablet Take 137 mcg by mouth daily.        lidocaine 4 % patch Place 1 patch on the skin daily. Remove and discard patch with 12 hours or as directed by MD.       losartan (COZAAR) 50 MG tablet Take 50 mg by mouth 2 (two) times a day.        MAGNESIUM HYDROXIDE, BULK, MISC Use 30 mL As Directed. Milk of Magnesia Suspension 1200 MG/15ML  (Magnesium Hydroxide) Give 30 ml by mouth as  needed for Constipation       melatonin 3 mg Tab tablet Take 3 mg by mouth at bedtime as needed.        multivitamin (MULTIPLE VITAMIN ORAL) Take 1 tablet by mouth daily.       nystatin (MYCOSTATIN) powder Apply topically 3 (three) times a day. Apply to Affected area topically three  times a day for Yeast       omeprazole (PRILOSEC) 20 MG capsule Take 20 mg by mouth 2 (two) times a day before meals.        peg 400-propylene glycol (SYSTANE) 0.4-0.3 % Drop Administer 1 drop to  both eyes 4 (four) times a day as needed.       polyethylene glycol (MIRALAX) 17 gram packet Take 17 g by mouth daily.       simethicone (MYLICON) 80 MG chewable tablet Chew 80 mg 4 (four) times a day as needed for flatulence. Simethicone Tablet 80 MG Give 80 mg by mouth as  needed for Abdominal Distention 4 times daily after  meals and HS as needed       spironolactone (ALDACTONE) 25 MG tablet Take 0.5 tablets (12.5 mg total) by mouth daily. (Patient taking differently: Take 12.5 mg by mouth 2 (two) times a day. ) 30 tablet 1     sucralfate (CARAFATE) 100 mg/mL suspension Take 1 g by mouth 2 (two) times a day.       triamcinolone (KENALOG) 0.1 % cream Apply thin layer to feet up to twice daily as needed (Patient taking differently: 2 (two) times a day as needed. Apply thin layer to feet up to twice daily as needed) 30 g 0     No current facility-administered medications for this visit.      Allergies   Allergen Reactions     Penicillins Anaphylaxis, Shortness Of Breath and Rash     Aspirin Nausea Only     Atenolol Cough     Atorvastatin Myalgia     Bupropion Nausea Only     Cleocin [Clindamycin Hcl] Other (See Comments)     Constipation      Codeine Nausea Only     Ibuprofen Nausea Only     Stomach upset     Lovastatin Myalgia     Cephalexin Rash     Neck reddness     Review of Systems:    Constitutional: Negative.  Negative for fever, chills,has  activity change, appetite change and fatigue.   She experiences significant orthostatic drop in her blood pressure greater than 50 points in sbp.sbp was 187 in am and dropped down to more than 110 sbp  When she stood up  HENT: Negative for congestion and facial swelling.    Eyes: Negative for photophobia, redness and visual disturbance.   Respiratory: Negative for cough and chest tightness.    Cardiovascular: Negative for chest pain, palpitations and HAS leg swelling.   Gastrointestinal: Negative for nausea, diarrhea, she has chronic constipation,no blood in stool and  abdominal distention.   Complaining of abdominal bloating  She is now been  complaining of intermittent right lower quadrant abdominal pain with cramping .  Genitourinary: Negative.  y  Musculoskeletal: Negative.    Complaining of low back pain   Skin: Negative.    Neurological: Negative for dizziness, tremors, syncope, weakness, light-headedness and headaches.   Hematological: Does not bruise/bleed easily.   Psychiatric/Behavioral: Negative.  Remains anxious  Blood pressure 114/77 pulse 76 weight 156 pounds    Physical Exam:    GENERAL: no acute distress. Cooperative in conversation.   HEENT: pupils are equal, round and reactive. Oral mucosa is moist and intact.  RESP:Chest symmetric. Regular respiratory rate. No stridor.  CVS: S1S2; she has an ejection murmur  ABD: Nondistended, soft.  No rebound no guarding but tender to deep palpation in the right lower quadrant  EXTREMITIES: Trace lower extremity edema.  She has limited abduction in her left shoulder due to an old injury.  Right shoulder has good range of movement limited power and strength noted in the bilateral lower extremities  NEURO: non focal. Alert and oriented x3.   PSYCH: within normal limits. No depression ;has anxiety.  SKIN: warm dry intact     Labs:      Results for orders placed or performed during the hospital encounter of 07/04/18   Basic metabolic panel   Result Value Ref Range    Sodium 138 136 - 145 mmol/L    Potassium 4.4 3.5 - 5.0 mmol/L    Chloride 102 98 - 107 mmol/L    CO2 30 22 - 31 mmol/L    Anion Gap, Calculation 6 5 - 18 mmol/L    Glucose 104 70 - 125 mg/dL    Calcium 8.9 8.5 - 10.5 mg/dL    BUN 11 8 - 28 mg/dL    Creatinine 0.59 (L) 0.60 - 1.10 mg/dL    GFR MDRD Af Amer >60 >60 mL/min/1.73m2    GFR MDRD Non Af Amer >60 >60 mL/min/1.73m2       Assessment/Plan:    Bilateral leg weakness s She has a history of repetitive bilateral lower extremity weakness  Associated with that she had severe low back pain.  Neurology suspect she has  some neurodegenerative disorder not ruling out Parkinson's welt versus multisystem atrophy.  Repeat imaging included MRI brain and C-spine did not show any new changes she does have multilevel C-spine degenerative changes but no progress since 4/2018  Discharge for PT OT and rehab with outpatient follow-up with neurology.  Ambulating currently using a walker  She has been initiated on Sinemet and recommended to see a movement disorder clinic.      Acute back pain.  She has a history of acute on chronic pain in her back neck and legs..  Patient has been opioid dependent and it was reviewed that on her imaging she does have multilevel degenerative changes  Pain clinic saw her and recommended weaning her off her narcotics and trying Tylenol diclofenac gel and topical Lidoderm patch.  Also initiated on gabapentin .    Severe orthostatic hypotension which is a long-standing concern.  She has had labile blood pressures in the TCU as well as in the hospital    Seen by cardiology and taken off her fludrocortisone and given a low-dose of spironolactone at 12.5 daily.  Currently taken off amlodipine.  Losartan is being held frequently due to low blood pressures and significant orthostatic drops she has a cardiac appointment tomorrow    Sinus bradycardia with heart rates down to the 50s and 40s  Patient had some workup done and her cortisol level was 10 with no evidence of adrenal insufficiency  Felt to be medication induced most likely from metoprolol which was discontinued .  Advised to follow-up with cardiology as an outpatient.  She remains asymptomatic.    Hypothyroidism on replacement Synthroid    Mood disorder with significant anxiety being reported by patient with repetitive concerns   Discharge on a higher dose of Celexa 30 mg melatonin added for insomnia concerns    Chronic constipation /IBS-continue with his stool regimen  Add senna as to her regimen on a scheduled basis now.    GERD on a PPI    History of insomnia  on melatonin .    Abdominal pain primarily in the right lower quadrant unclear etiology -  GI referral given for EGD  No improvement noted    Cognitive impairment with confusion noted in the hospital.  Patient will take her evening meds as per the hospital and then repeat the medications again and her confusion she would repeat doses of losartan metoprolol Prilosec and spironolactone so it is felt that she is no longer safe to self administer medication and they recommended a home care RN to go home with her  We will await her cognitive testing and medication safety evaluation.    History of AAA status post bifurcated stent graft repair her last CTA done on 4/2016 was stable  Osteoporosis she gets Boniva but is now on wanting to discontinue the medication. she is also on calcium supplement.    Vitamin D deficiency on supplementation  Debilitation-quite profound she is here for PT OT and rehab requesting a DNR/DNI CODE STATUS  She is awaiting placement in assisted living facility and probably will discharge with more services .  Await cardiology's recommendation based on follow-up visit tomorrow    Electronically signed by: ANNIKA Oconnor  This progress note was completed using Dragon software and there may be grammatical errors.

## 2021-06-19 NOTE — PROGRESS NOTES
[facility]      NAME:  Mehreen Camara             :  1940    MRN: [id number]    CODE STATUS:  DNR/DNI    FACILITY: [facility] [[id number]]         CHIEF COMPLAIN/REASON FOR VISIT:  Chief Complaint   Patient presents with     Problem Visit     Acid reflux       HISTORY OF PRESENT ILLNESS: Mehreen Camara is a 78 y.o. female being seen today at request of nursing for severe epigastric discomfort overnight. Prior pt in TCU, dc last May with [facility]HA services. Then, on  she went to the ER at [facility] with extremely  weakness. She was dc to a TCU in [facility], on  but now transferred to this TCU. PMH includes:  severe orthostatic hypotension and chronic back and SI joint pain with venous stasis and AAA status post stent with moderate aortic stenoses ; hypothyroidism;  hyperlipidemia.  She presented with increasing leg weakness for 1 week after undergoing an SI joint steroid injection, with increasing bilateral lower extremity leg weakness with some gait instability noted.  Apparently she has had similar issues in the past after getting an SI joint injection. She is not a good historian and tends to wander during her ROS and assessment.     Mehreen is seen in her room at the request of nursing to evaluate for severe epigastric discomfort that was occurring overnight, per hospital notes burning abdominal pain has been a constant issue, she has a history of Harris's esophagus, and is on Carafate and omeprazole twice daily.  She reports that she was started on Zantac prior to going into the hospital a few weeks ago but that it was not continued when she was in the hospital.  She is also wondering if she should get an EGD and follow-up with GI.  She is also complaining of constipation, her Colace was discontinued and replaced with senna S, she is on MiraLAX daily for many years, she says she has not gone in 3 days and she is quite uncomfortable.  She continues  to have variable blood pressures and parameters have been placed on all of her BP meds.      Allergies   Allergen Reactions     Penicillins Anaphylaxis, Shortness Of Breath and Rash     Aspirin Nausea Only     Atenolol Cough     Atorvastatin Myalgia     Bupropion Nausea Only     Cleocin [Clindamycin Hcl] Other (See Comments)     Constipation      Codeine Nausea Only     Ibuprofen Nausea Only     Stomach upset     Lovastatin Myalgia     Cephalexin Rash     Neck reddness   :     Current Outpatient Prescriptions   Medication Sig     acetaminophen (TYLENOL) 500 MG tablet Take 2 tablets (1,000 mg total) by mouth 3 (three) times a day. (Patient taking differently: Take 1,000 mg by mouth 3 (three) times a day. )     aluminum-magnesium hydroxide 200-200 mg/5 mL suspension Take 10 mL by mouth every 6 (six) hours as needed for indigestion. (Patient taking differently: Take 5 mL by mouth every 4 (four) hours as needed for indigestion. )     amLODIPine (NORVASC) 2.5 MG tablet Take 1 tablet (2.5 mg total) by mouth daily. Hold for sbp<110     bisacodyl (DULCOLAX) 10 mg suppository Insert 10 mg into the rectum daily as needed.     CALCIUM CITRATE ORAL Take 500 mg by mouth daily.     carbidopa-levodopa (SINEMET)  mg per tablet Take 2 tablets by mouth 3 (three) times a day. (Patient taking differently: Take 2 tablets by mouth 3 (three) times a day. Give 200 mg  by mouth three times a day related to WEAKNESS)     cholecalciferol, vitamin D3, 1,000 unit tablet Take 2,000 Units by mouth daily.      citalopram (CELEXA) 20 MG tablet Take 1.5 tablets (30 mg total) by mouth Daily after lunch.     diclofenac sodium (VOLTAREN) 1 % Gel Lower extremities: 4 g to the affected area 4 times daily. Upper extremities: 2 g to the affected area 4 times daily. Do not exceed 32 g total dose per day. (Patient taking differently: 2 g 4 (four) times a day. Diclofenac Sodium Gel 1 % Apply 2 gram  transdermally four times a day for Pain Apply to  upper  extremities Do not exceed 32 g total per day)     docusate sodium (COLACE) 100 MG capsule Take 1 capsule (100 mg total) by mouth 2 (two) times a day as needed for constipation.     gabapentin (NEURONTIN) 100 MG capsule Take 100 mg by mouth 2 (two) times a day.     gabapentin (NEURONTIN) 100 MG capsule Take 200 mg by mouth at bedtime.     ibandronate (BONIVA) 150 mg tablet Take 150 mg by mouth every 30 (thirty) days. Give  150 mg by mouth one time a day every 1 month(s)  starting on the 20th for 28 day(s) related to AGERELATED  OSTEOPOROSIS WITHOUT CURRENT  PATHOLOGICAL FRACTURE (M81.0) Start on the  20th and end on the 20th every month. Take on  empty stomach, 30 minutes before breakfast with full  glass of water and must remain sitting up for 30  minutes     levothyroxine (SYNTHROID, LEVOTHROID) 137 MCG tablet Take 137 mcg by mouth daily.      lidocaine 4 % patch Place 1 patch on the skin daily. Remove and discard patch with 12 hours or as directed by MD.     losartan (COZAAR) 50 MG tablet Take 50 mg by mouth 2 (two) times a day.      MAGNESIUM HYDROXIDE, BULK, MISC Use 30 mL As Directed. Milk of Magnesia Suspension 1200 MG/15ML  (Magnesium Hydroxide) Give 30 ml by mouth as  needed for Constipation     melatonin 3 mg Tab tablet Take 3 mg by mouth at bedtime as needed.      multivitamin (MULTIPLE VITAMIN ORAL) Take 1 tablet by mouth daily.     nystatin (MYCOSTATIN) powder Apply topically 3 (three) times a day. Apply to Affected area topically three  times a day for Yeast     omeprazole (PRILOSEC) 20 MG capsule Take 20 mg by mouth 2 (two) times a day before meals.      peg 400-propylene glycol (SYSTANE) 0.4-0.3 % Drop Administer 1 drop to both eyes 4 (four) times a day as needed.     polyethylene glycol (MIRALAX) 17 gram packet Take 17 g by mouth daily.     simethicone (MYLICON) 80 MG chewable tablet Chew 80 mg 4 (four) times a day as needed for flatulence. Simethicone Tablet 80 MG Give 80 mg by mouth  as  needed for Abdominal Distention 4 times daily after  meals and HS as needed     spironolactone (ALDACTONE) 25 MG tablet Take 0.5 tablets (12.5 mg total) by mouth daily. (Patient taking differently: Take 12.5 mg by mouth 2 (two) times a day. )     sucralfate (CARAFATE) 100 mg/mL suspension Take 1 g by mouth 2 (two) times a day.     triamcinolone (KENALOG) 0.1 % cream Apply thin layer to feet up to twice daily as needed (Patient taking differently: 2 (two) times a day as needed. Apply thin layer to feet up to twice daily as needed)         REVIEW OF SYSTEMS:    Currently, no fever, chills, or rigors. Does not have any visual or hearing problems. Denies any chest pain, headaches, palpitations, lightheadedness, shortness of breath, or cough. Appetite is good. Denies any GERD symptoms. Denies any difficulty with swallowing, nausea, or vomiting, diarrhea. Denies any urinary symptoms. Reports insomnia. No active bleeding. No rash.   She endorses constipation, and burning epigastric pain.    PHYSICAL EXAMINATION:  Vitals:    07/25/18 1104   BP: 127/82   Pulse: 71   Resp: 18   Temp: 98.2  F (36.8  C)   SpO2: 96%   Weight: 151 lb (68.5 kg)         Physical Exam   General appearance: alert, appears stated age and cooperative.   Head: Normocephalic, without obvious abnormality, atraumatic, Eyes: sclera anicteric.  Lungs: respirations without effort.   Cardiovascular: S1, S2. Regular rate and rhythm.   ABDOMEN: Globular and soft, non tender to palpation.  Extremities: extremities normal, atraumatic, no cyanosis or edema  Skin: Skin warm and dry, turgor normal. No rashes or lesions  Neurologic: oriented. No focal deficits.   Psych: interacts well with caregivers, exhibits logical thought processes and connections, anxious.      LABS:    Lab Results   Component Value Date    WBC 5.9 07/08/2018    HGB 13.3 07/08/2018    HCT 39.4 07/08/2018    MCV 95 07/08/2018     07/08/2018       Results for orders placed or performed  during the hospital encounter of 07/04/18   Basic metabolic panel   Result Value Ref Range    Sodium 138 136 - 145 mmol/L    Potassium 4.4 3.5 - 5.0 mmol/L    Chloride 102 98 - 107 mmol/L    CO2 30 22 - 31 mmol/L    Anion Gap, Calculation 6 5 - 18 mmol/L    Glucose 104 70 - 125 mg/dL    Calcium 8.9 8.5 - 10.5 mg/dL    BUN 11 8 - 28 mg/dL    Creatinine 0.59 (L) 0.60 - 1.10 mg/dL    GFR MDRD Af Amer >60 >60 mL/min/1.73m2    GFR MDRD Non Af Amer >60 >60 mL/min/1.73m2           Lab Results   Component Value Date    HGBA1C 6.0 12/11/2015     Vitamin D, Total (25-Hydroxy)   Date Value Ref Range Status   04/17/2017 28.1 (L) 30.0 - 80.0 ng/mL Final     Lab Results   Component Value Date    AXAXZLXN26 920 (H) 05/01/2018       ASSESSMENT/PLAN:  1. Epigastric pain    2. Chronic idiopathic constipation      1.  I will reinstate Zantac however given her long history of this issue I am doubtful of its impact.  She continues to use Maalox as needed, and take her Carafate and PPI.  I did encourage her to follow-up with GI when she is outpatient.  2.  Bisacodyl suppository per rectum ×1 today, continue bowel regimen.  MCS will follow throughout TCU stay.       Electronically signed by:  Nancy Daly  This progress note was completed using Dragon software and there may be grammatical errors.    25  minutes spent of which greater than 75 % was face to face communication with the patient and nurse manager about above plan of care

## 2021-06-19 NOTE — PROGRESS NOTES
Assessment:     1. Abdominal pain, epigastric  Ambulatory referral to Gastroenterology   2. Heart murmur  Echo Complete   3. Perineal rash in female  clotrimazole-betamethasone (LOTRISONE) cream   4. Hypertension     5. Frequent stools         Greater than 40 minutes was spent today in interview and examination with patient with more than 50% of that time in counseling and coordination of care regarding her ongoing issues of stomach discomfort, frequent stooling, labile hypertension and concerns about murmur.       Plan:     Patient Instructions   Spironolactone. Pt is to take 12.5mg daily. This was decreased in May 2018.     Losartan is 50mg two times a day (no changes have been made to this).     STOP MIRALAX to see if that helps with abdominal pain.    Juany Bentley MD  8/17/2018             The diagnosis was discussed with the patient and evaluation and treatment plans outlined.  See orders for lab and imaging studies.  Adhere to simple, bland diet.  Follow up: Return in about 2 weeks (around 8/31/2018) for Recheck.     Subjective:      Mehreen Camara is a  female who presents for evaluation of   Chief Complaint   Patient presents with     Hospital Visit Follow Up     Pt was seen at [facility] on 7/4 -7/9/18 for bilateral extremity weakness- admitted into TCU for BP     Patient is here for multiple medical issues as follows    1.  Hypertension-she has been given a hand written note by her cardiologist stating that she should be on spironolactone 25 mg daily.  She has had hypotensive episodes in the past.  During her TCU and hospital admissions she has been told to use spironolactone 12.5 mg daily and that is what has been documented in her discharges too  She indicates that she is feeling well and denies any symptoms referable to her elevated blood pressure.   Specifically denies chest pain, palpitations, dyspnea, orthopnea, PND or peripheral edema  Her blood pressures have been pretty much controlled.  " Yesterday she had a good day.      2.  Epigastric pain and heartburn: These are the most pressing issues.  An endoscopy done was normal.  She was prescribed 4 tablets of her medication to take at nighttime.  Initially it helped after the GI prescribed this on Monday.  Name of this medication is not known.  However yesterday and again today she has had the symptoms which she describes as burning discomfort and epigastric cramping.     3. BM: She has ongoing bowel movement concerns.  Currently she describes as having the need to stool many times.  Her stools are soft and mushy per her description.  She denies watery diarrhea.  For example, today she has had the need to use the bathroom about 5 times  Off note, she does use MiraLAX daily.  In the past she was on multiple stool softeners for chronic constipation.  She informs me that a couple of years ago she had \"backed up and had needed to be cleaned\"  and was told to use MiraLAX for the rest of her life    4. Skin Rash she describes that her perineal area: Is itchy and is very inflamed.  She informs that she has been using OTC nystatin without much benefit as she has been told to.  On asking leading question, she does admit to trying to clean herself well using toilet paper after each stool.    5. Heart murmur: She was told by GI during exam that she has a prominent murmur.  She has been aware about this murmur in the past.  Recent cardiology appointment does not mention this murmur.  An echo cardiogram done last year does show mild to moderate aortic stenosis.    I detailed review of her chart is done including hospitalization and TCU and previous clinic notes.    The last note from a PCP from 5/28 gives us some insight.  This is as follows-    (2. Anxiety  Observed both during hospitalization and her TCU stay to have significant impact on patient.  Patient is frequently fixated on a physical symptom or a perceived medication side effect.  Her medication regimen " has changed countless times over the last 2 years both regarding her antihypertensive regimen due to her labile hypertension and episodes of lightheadedness, and her anxiety medication has been changed a number of times as well.  Now she is stating the citalopram does not work at all and she wants to consider going back to sertraline.  I am not certain how well the sertraline is working for her either.  I would strongly recommend a psychiatry consult, which I have advised in the past, but has never been done (other than inpatient).  I strongly advised they schedule this, although she is quite hesitant.  She wants to know how they can help.  I explained to patient that we have tried various medications, have never been able to really control her anxiety, which I feel is significantly impacting her life, and the best way to hopefully get her feeling better as quick as possible would be with expertise of psychiatry.  Referral placed in specialty  to contact daughter Leticia to set that up.  - Ambulatory referral to Psychiatry     3. Post-menopausal  - DXA Bone Density Scan; Future     4.  Labile hypertension  Patient with significant supine hypertension, as well as orthostatic hypotension.  Continues to be followed by cardiology.  In the past, there have been multiple medication changes by inpatient medicine, TCU, clinical pharmacist, endocrinologist, cardiologist.  At this point, cardiology is managing this issue.     5.  Features of parkinsonism  Patient has some features of parkinsonism.  With her significant anxiety, lightheadedness, gait changes, I referred her about 2 years ago to neurology for possible Parkinson's versus multisystem atrophy.  Neurology evaluation at that time felt she was not exhibiting symptoms of a specific disorder.  She was reevaluated by neurology during her recent hospitalization and they felt that again this was not Parkinson's, but there is a possibility multisystem atrophy.   Follow-up after discharge recommended but not scheduled.  I will have our specialty  contact her daughter Leticia at patient's request to set this up.     6.  AAA status post endovascular repair  Stable     7.  Adrenal incidentaloma  Had been evaluated by endocrinology and felt not to be hormonally active)  The following portions of the patient's history were reviewed and updated as appropriate: allergies, current medications, past family history, past medical history, past social history, past surgical history and problem list.    Allergies   Allergen Reactions     Penicillins Anaphylaxis, Shortness Of Breath and Rash     Aspirin Nausea Only     Atenolol Cough     Atorvastatin Myalgia     Bupropion Nausea Only     Cleocin [Clindamycin Hcl] Other (See Comments)     Constipation      Codeine Nausea Only     Ibuprofen Nausea Only     Stomach upset     Lovastatin Myalgia     Cephalexin Rash     Neck reddness       Current Outpatient Prescriptions on File Prior to Visit   Medication Sig Dispense Refill     carbidopa-levodopa (SINEMET)  mg per tablet Take 2 tablets by mouth 3 (three) times a day. (Patient taking differently: Take 2 tablets by mouth 3 (three) times a day. Give 200 mg  by mouth three times a day related to WEAKNESS)  0     cholecalciferol, vitamin D3, 1,000 unit tablet Take 2,000 Units by mouth daily.        citalopram (CELEXA) 20 MG tablet Take 1.5 tablets (30 mg total) by mouth Daily after lunch.  0     gabapentin (NEURONTIN) 100 MG capsule Take 100 mg by mouth 2 (two) times a day.       gabapentin (NEURONTIN) 100 MG capsule Take 200 mg by mouth at bedtime.       levothyroxine (SYNTHROID, LEVOTHROID) 137 MCG tablet Take 137 mcg by mouth daily.        multivitamin (MULTIPLE VITAMIN ORAL) Take 1 tablet by mouth daily.       omeprazole (PRILOSEC) 20 MG capsule Take 20 mg by mouth 2 (two) times a day before meals.        peg 400-propylene glycol (SYSTANE) 0.4-0.3 % Drop Administer 1 drop to  both eyes 4 (four) times a day as needed.       ranitidine (ZANTAC) 150 MG tablet Take 150 mg by mouth at bedtime.       spironolactone (ALDACTONE) 25 MG tablet Take 0.5 tablets (12.5 mg total) by mouth daily. (Patient taking differently: Take 0.5 tablets by mouth 2 (two) times a day. Patient taking 25mg tab at bedtime  Indications: hypertension) 30 tablet 1     acetaminophen (TYLENOL) 500 MG tablet Take 2 tablets (1,000 mg total) by mouth 3 (three) times a day. (Patient taking differently: Take 1,000 mg by mouth 3 (three) times a day. )  0     diclofenac sodium (VOLTAREN) 1 % Gel Lower extremities: 4 g to the affected area 4 times daily. Upper extremities: 2 g to the affected area 4 times daily. Do not exceed 32 g total dose per day. (Patient taking differently: 2 g 4 (four) times a day. Diclofenac Sodium Gel 1 % Apply 2 gram  transdermally four times a day for Pain Apply to upper  extremities Do not exceed 32 g total per day)  0     lidocaine 4 % patch Place 1 patch on the skin daily. Remove and discard patch with 12 hours or as directed by MD.       losartan (COZAAR) 50 MG tablet Take 50 mg by mouth 2 (two) times a day.       melatonin 3 mg Tab tablet Take 3 mg by mouth at bedtime as needed.        sucralfate (CARAFATE) 100 mg/mL suspension Take 1 g by mouth 2 (two) times a day.       triamcinolone (KENALOG) 0.1 % cream Apply thin layer to feet up to twice daily as needed (Patient taking differently: 2 (two) times a day as needed. Apply thin layer to feet up to twice daily as needed) 30 g 0     No current facility-administered medications on file prior to visit.        Patient Active Problem List   Diagnosis     Osteoarthritis     Cataract     Esophageal Reflux     Hypothyroidism     Dyslipidemia     Chronic Major Depression     Hypertension     Aneurysm Of The Abdominal Aorta     Harris's Esophagus     Osteopenia     Rotator Cuff Tendonitis     Retinal Migraine Headache     Menopause Has Occurred     Dizziness      Obesity     Tinnitus Of Both Ears     Adrenal adenoma     S/P AAA repair using bifurcation graft     Weakness     Anxiety     Bloating     Orthostatic hypotension     Urinary incontinence in female     Dizziness     Primary insomnia     Orthostatic hypotension dysautonomic syndrome (H)     Epigastric pain     Constipation     Major depression, chronic     Adjustment disorder with mixed anxiety and depressed mood     Insomnia due to anxiety and fear     Partial small bowel obstruction     Weak     Major depressive disorder, single episode, moderate (H)     Lower extremity weakness     Weakness of both lower extremities     Adjustment disorder with anxious mood     Mood disorder due to a general medical condition     Insomnia due to mental condition     Bradycardia     Lower back pain       Past Medical History:   Diagnosis Date     AAA (abdominal aortic aneurysm) (H)      Acute encephalopathy 9/26/2015     Adrenal adenoma      Harris esophagus      Constipation      Depression      GERD (gastroesophageal reflux disease)      Hiatal hernia      HLD (hyperlipidemia)      HTN (hypertension)      Hypothyroid      Murmur      Osteoarthritis      Retinal Migraine Headache      S/P AAA repair using bifurcation graft 11/30/2015     Scoliosis      Thyroid nodule     removed nodules and thyroid       Past Surgical History:   Procedure Laterality Date     JOINT REPLACEMENT       CO ARTHRODESIS ANT INTERBODY MIN DISCECTOMY, CERVICAL BELOW C2      Description: Cervical Vertebral Fusion;  Recorded: 01/21/2013;  Comments: 1981     CO REVISE MEDIAN N/CARPAL TUNNEL SURG      Description: Neuroplasty Decompression Median Nerve At Carpal Tunnel;  Recorded: 01/21/2013;  Comments: bilateral     CO THYROIDECTOMY      Description: Thyroid Surgery Total Thyroidectomy;  Recorded: 06/08/2011;     CO TOTAL ABDOM HYSTERECTOMY  1985    Description: Hysterectomy;  Recorded: 01/21/2013;     CO TOTAL HIP ARTHROPLASTY Right     Description:  Total Hip Replacement;  Recorded: 01/21/2013;  Comments: right, 2003       Family History   Problem Relation Age of Onset     Heart disease Mother      Hypertension Mother      Heart disease Father      Cancer Brother 57     colon     Hypertension Daughter      Hypertension Daughter      Neuropathy Daughter      Lupus Daughter      Heart disease Paternal Aunt      Heart disease Paternal Uncle      Parkinsonism Paternal Uncle        Social History     Social History     Marital status:      Spouse name: N/A     Number of children: N/A     Years of education: N/A     Social History Main Topics     Smoking status: Former Smoker     Types: Cigarettes     Quit date: 12/11/1994     Smokeless tobacco: Never Used     Alcohol use No     Drug use: No     Sexual activity: Not Asked     Other Topics Concern     None     Social History Narrative       Review of Systems  A 12 point comprehensive review of systems was negative except as noted.       Objective:   BP (!) 148/97 (Patient Site: Left Arm, Patient Position: Sitting, Cuff Size: Adult Regular)  Pulse 72  Temp 96.7  F (35.9  C) (Oral)   Resp 16  Wt 155 lb 9.6 oz (70.6 kg)  SpO2 98%  BMI 24.74 kg/m2  GENERAL APPEARANCE:  Appearing stated age, alert, cooperative, and in no acute distress.   HEENT:  Ear canals and tympanic membranes are normal. Lips, mouth, and throat are unremarkable.   NECK: Neck supple without adenopathy, thyromegaly or masses.   LYMPH: No anterior cervical or supraclavicular LN enlargement   PULMONARY: Normal respiratory effort. Chest is clear.   CARDIOVASCULAR: Heart auscultation: rhythm regular, prominent systolic ejection murmur is noted.   SKIN: Warm and well perfused.  GENITALIA: Perineal area has excoriations scabbing and extremely dry skin with erythema noted   ABDOMEN: Abdomen soft, epigastric tenderness without rebound, BS normal. No masses or organomegaly.   EXTREMITIES: Peripheral pulses are full. Extremities with no edema.    MENTAL STATUS: Alert, oriented and thought content appropriate   NEUROLOGIC: Station and gait normal, strength and movement normal, reflexes are normal and symmetric

## 2021-06-19 NOTE — PROGRESS NOTES
[facility] Patient Outreach:     Discharge Date::  8/9/18      Reason for TCU stay (discharge diagnosis)::  Weakness, orthostasis, chronic back pain      Are you feeling better, the same or worse since your discharge?:  Patient is feeling the same (still pretty weak.  )          As part of your discharge plan, did they discuss home care with you?: Yes        Have your seen them yet, or are they scheduled to visit?: Yes                Do you have any follow up visits scheduled with your PCP or Specialist?:  Yes, with PCP      (RN) Is it scheduled soon enough (3-5 days)?: No        (RN) Is the patient okay with moving appointment up (if RN feels appropriate)?: No    I'm glad to hear you're doing well and we want you to continue to do well. Your PCP would like to see you for a follow-up visit. Can we help set that up for your today?: No        (RN) Provided patient the PCP's phone number to call if they have any questions or concerns?: Yes

## 2021-06-19 NOTE — PROGRESS NOTES
[facility]      NAME:  Mehreen Camara             :  1940    MRN: [id number]    CODE STATUS:  DNR/DNI    FACILITY: [facility] [[id number]]         CHIEF COMPLAIN/REASON FOR VISIT:  Chief Complaint   Patient presents with     Problem Visit     Bp changes       HISTORY OF PRESENT ILLNESS: Mehreen Camara is a 78 y.o. female being seen today at request of nursing for low blood pressure. Prior pt in TCU, dc last May with HHA services. Then, on  she went to the ER at [facility] with extremely  weakness. She was dc to a TCU in [facility], on  but now transferred to this TCU. PMH includes:  severe orthostatic hypotension and chronic back and SI joint pain with venous stasis and AAA status post stent with moderate aortic stenoses ; hypothyroidism;  hyperlipidemia.  She presented with increasing leg weakness for 1 week after undergoing an SI joint steroid injection, with increasing bilateral lower extremity leg weakness with some gait instability noted.  Apparently she has had similar issues in the past after getting an SI joint injection. She is not a good historian and tends to wander during her ROS and assessment.   Mehreen is seen in her room today sitting up in her wheelchair, per nursing her blood pressure was 181/95 and after breakfast and medications it was 95/58.  She does have a previous diagnosis of orthostatic hypotension in this range of blood pressures is not unusual for her.  She is symptomatic with the hypotension, she was dizzy and nauseous.  She was sitting down in her wheelchair at the time and did not experience any syncope.  The amlodipine is a new medication, after being changed from a beta-blocker due to bradycardia.  She is on 2.5 mg at this time, and spironolactone and losartan twice daily.  She is drinking adequate fluids.  On Friday I placed parameters on her amlodipine.  She is also complaining of some constipation, we did start  Colace last Friday and she is on MiraLAX daily.  Allergies   Allergen Reactions     Penicillins Anaphylaxis, Shortness Of Breath and Rash     Aspirin Nausea Only     Atenolol Cough     Atorvastatin Myalgia     Bupropion Nausea Only     Cleocin [Clindamycin Hcl] Other (See Comments)     Constipation      Codeine Nausea Only     Ibuprofen Nausea Only     Stomach upset     Lovastatin Myalgia     Cephalexin Rash     Neck reddness   :     Current Outpatient Prescriptions   Medication Sig     acetaminophen (TYLENOL) 500 MG tablet Take 2 tablets (1,000 mg total) by mouth 3 (three) times a day. (Patient taking differently: Take 1,000 mg by mouth 3 (three) times a day. )     aluminum-magnesium hydroxide 200-200 mg/5 mL suspension Take 10 mL by mouth every 6 (six) hours as needed for indigestion. (Patient taking differently: Take 5 mL by mouth every 4 (four) hours as needed for indigestion. )     amLODIPine (NORVASC) 2.5 MG tablet Take 1 tablet (2.5 mg total) by mouth daily. Hold for sbp<110     bisacodyl (DULCOLAX) 10 mg suppository Insert 10 mg into the rectum daily as needed.     CALCIUM CITRATE ORAL Take 500 mg by mouth daily.     carbidopa-levodopa (SINEMET)  mg per tablet Take 2 tablets by mouth 3 (three) times a day. (Patient taking differently: Take 2 tablets by mouth 3 (three) times a day. Give 200 mg  by mouth three times a day related to WEAKNESS)     cholecalciferol, vitamin D3, 1,000 unit tablet Take 2,000 Units by mouth daily.      citalopram (CELEXA) 20 MG tablet Take 1.5 tablets (30 mg total) by mouth Daily after lunch.     diclofenac sodium (VOLTAREN) 1 % Gel Lower extremities: 4 g to the affected area 4 times daily. Upper extremities: 2 g to the affected area 4 times daily. Do not exceed 32 g total dose per day. (Patient taking differently: 2 g 4 (four) times a day. Diclofenac Sodium Gel 1 % Apply 2 gram  transdermally four times a day for Pain Apply to upper  extremities Do not exceed 32 g total per  day)     docusate sodium (COLACE) 100 MG capsule Take 1 capsule (100 mg total) by mouth 2 (two) times a day as needed for constipation.     gabapentin (NEURONTIN) 100 MG capsule Take 100 mg by mouth 2 (two) times a day.     gabapentin (NEURONTIN) 100 MG capsule Take 200 mg by mouth at bedtime.     ibandronate (BONIVA) 150 mg tablet Take 150 mg by mouth every 30 (thirty) days. Give  150 mg by mouth one time a day every 1 month(s)  starting on the 20th for 28 day(s) related to AGERELATED  OSTEOPOROSIS WITHOUT CURRENT  PATHOLOGICAL FRACTURE (M81.0) Start on the  20th and end on the 20th every month. Take on  empty stomach, 30 minutes before breakfast with full  glass of water and must remain sitting up for 30  minutes     levothyroxine (SYNTHROID, LEVOTHROID) 137 MCG tablet Take 137 mcg by mouth daily.      lidocaine 4 % patch Place 1 patch on the skin daily. Remove and discard patch with 12 hours or as directed by MD.     losartan (COZAAR) 50 MG tablet Take 50 mg by mouth 2 (two) times a day.      MAGNESIUM HYDROXIDE, BULK, MISC Use 30 mL As Directed. Milk of Magnesia Suspension 1200 MG/15ML  (Magnesium Hydroxide) Give 30 ml by mouth as  needed for Constipation     melatonin 3 mg Tab tablet Take 3 mg by mouth at bedtime as needed.      multivitamin (MULTIPLE VITAMIN ORAL) Take 1 tablet by mouth daily.     nystatin (MYCOSTATIN) powder Apply topically 3 (three) times a day. Apply to Affected area topically three  times a day for Yeast     omeprazole (PRILOSEC) 20 MG capsule Take 20 mg by mouth 2 (two) times a day before meals.      peg 400-propylene glycol (SYSTANE) 0.4-0.3 % Drop Administer 1 drop to both eyes 4 (four) times a day as needed.     polyethylene glycol (MIRALAX) 17 gram packet Take 17 g by mouth daily.     simethicone (MYLICON) 80 MG chewable tablet Chew 80 mg 4 (four) times a day as needed for flatulence. Simethicone Tablet 80 MG Give 80 mg by mouth as  needed for Abdominal Distention 4 times daily  after  meals and HS as needed     spironolactone (ALDACTONE) 25 MG tablet Take 0.5 tablets (12.5 mg total) by mouth daily. (Patient taking differently: Take 12.5 mg by mouth 2 (two) times a day. )     sucralfate (CARAFATE) 100 mg/mL suspension Take 1 g by mouth 2 (two) times a day.     triamcinolone (KENALOG) 0.1 % cream Apply thin layer to feet up to twice daily as needed (Patient taking differently: 2 (two) times a day as needed. Apply thin layer to feet up to twice daily as needed)         REVIEW OF SYSTEMS:    Currently, no fever, chills, or rigors. Does not have any visual or hearing problems. Denies any chest pain, headaches, palpitations, lightheadedness, dizziness, shortness of breath, or cough. Appetite is good. Denies any GERD symptoms. Denies any difficulty with swallowing, nausea, or vomiting.  Denies any abdominal pain, diarrhea. Denies any urinary symptoms. Reports insomnia. No active bleeding. No rash.   Endorses dizziness and nausea when hypotensive after breakfast today.    PHYSICAL EXAMINATION:  Vitals:    07/23/18 1327   BP: 95/59   Pulse: 78   Temp: 97.8  F (36.6  C)   SpO2: 94%   Weight: 151 lb (68.5 kg)         Physical Exam   General appearance: alert, appears stated age and cooperative.   Head: Normocephalic, without obvious abnormality, atraumatic, Eyes: sclera anicteric.  Lungs: respirations without effort.   Cardiovascular: S1, S2. Regular rate and rhythm.   ABDOMEN: Globular and soft, non tender.    Extremities: extremities normal, atraumatic, no cyanosis or edema  Skin: Skin warm and dry, turgor normal. No rashes or lesions  Neurologic: oriented. No focal deficits.   Psych: interacts well with caregivers, exhibits logical thought processes and connections, pleasant      LABS:    Lab Results   Component Value Date    WBC 5.9 07/08/2018    HGB 13.3 07/08/2018    HCT 39.4 07/08/2018    MCV 95 07/08/2018     07/08/2018       Results for orders placed or performed during the hospital  encounter of 07/04/18   Basic metabolic panel   Result Value Ref Range    Sodium 138 136 - 145 mmol/L    Potassium 4.4 3.5 - 5.0 mmol/L    Chloride 102 98 - 107 mmol/L    CO2 30 22 - 31 mmol/L    Anion Gap, Calculation 6 5 - 18 mmol/L    Glucose 104 70 - 125 mg/dL    Calcium 8.9 8.5 - 10.5 mg/dL    BUN 11 8 - 28 mg/dL    Creatinine 0.59 (L) 0.60 - 1.10 mg/dL    GFR MDRD Af Amer >60 >60 mL/min/1.73m2    GFR MDRD Non Af Amer >60 >60 mL/min/1.73m2           Lab Results   Component Value Date    HGBA1C 6.0 12/11/2015     Vitamin D, Total (25-Hydroxy)   Date Value Ref Range Status   04/17/2017 28.1 (L) 30.0 - 80.0 ng/mL Final     Lab Results   Component Value Date    [id number] 920 (H) 05/01/2018       ASSESSMENT/PLAN:  1. Orthostatic hypotension dysautonomic syndrome (H)      1.  I will change amlodipine to be given in the afternoon at a different time and then her other blood pressure meds and continue twice daily spironolactone and losartan.  This seems to be a long-standing problem and it was recommended that she see cardiology after her TCU stay so I will encourage her to continue with this plan.  Continue with adequate fluids, and continue with therapy and walking only with assistance.    MCS will follow throughout TCU stay.       Electronically signed by:  Nancy Daly  This progress note was completed using Dragon software and there may be grammatical errors.    25  minutes spent of which greater than 75 % was face to face communication with the patient and nurse manager about above plan of care

## 2021-06-19 NOTE — PROGRESS NOTES
[facility]      NAME:  Mehreen Camara             :  1940    MRN: [id number]    CODE STATUS:  DNR/DNI    FACILITY: [facility] [[id number]]         CHIEF COMPLAIN/REASON FOR VISIT:  Chief Complaint   Patient presents with     Problem Visit       HISTORY OF PRESENT ILLNESS: Mehreen Camara is a 78 y.o. female being seen today for POLST review and new admission. Prior pt in TCU, dc last May with HHA services. Then, on  she went to the ER at [facility] with extremely  weakness. She was dc to a TCU in [facility], on  but now transferred to this TCU. PMH includes:  severe orthostatic hypotension and chronic back and SI joint pain with venous stasis and AAA status post stent with moderate aortic stenoses ; hypothyroidism;  hyperlipidemia.  She presented with increasing leg weakness for 1 week after undergoing an SI joint steroid injection, with increasing bilateral lower extremity leg weakness with some gait instability noted.  Apparently she has had similar issues in the past after getting an SI joint injection. She is not a good historian and tends to wander during her ROS and assessment. She is seen in her room, reports some fatigue. She is also upset a physician ordered Boniva for her and does not wish to take this, explained risk benefits of meed to pt. We also reviewed her POLST.    Allergies   Allergen Reactions     Penicillins Anaphylaxis, Shortness Of Breath and Rash     Aspirin Nausea Only     Atenolol Cough     Atorvastatin Myalgia     Bupropion Nausea Only     Cleocin [Clindamycin Hcl] Other (See Comments)     Constipation      Codeine Nausea Only     Ibuprofen Nausea Only     Stomach upset     Lovastatin Myalgia     Cephalexin Rash     Neck reddness   :     Current Outpatient Prescriptions   Medication Sig     acetaminophen (TYLENOL) 500 MG tablet Take 2 tablets (1,000 mg total) by mouth 3 (three) times a day. (Patient taking differently: Take  1,000 mg by mouth 3 (three) times a day. )     aluminum-magnesium hydroxide 200-200 mg/5 mL suspension Take 10 mL by mouth every 6 (six) hours as needed for indigestion. (Patient taking differently: Take 5 mL by mouth every 4 (four) hours as needed for indigestion. )     amLODIPine (NORVASC) 2.5 MG tablet Take 1 tablet (2.5 mg total) by mouth daily. Hold for sbp<110     bisacodyl (DULCOLAX) 10 mg suppository Insert 10 mg into the rectum daily as needed.     CALCIUM CITRATE ORAL Take 500 mg by mouth daily.     carbidopa-levodopa (SINEMET)  mg per tablet Take 2 tablets by mouth 3 (three) times a day. (Patient taking differently: Take 2 tablets by mouth 3 (three) times a day. Give 200 mg  by mouth three times a day related to WEAKNESS)     cholecalciferol, vitamin D3, 1,000 unit tablet Take 2,000 Units by mouth daily.      citalopram (CELEXA) 20 MG tablet Take 1.5 tablets (30 mg total) by mouth Daily after lunch.     diclofenac sodium (VOLTAREN) 1 % Gel Lower extremities: 4 g to the affected area 4 times daily. Upper extremities: 2 g to the affected area 4 times daily. Do not exceed 32 g total dose per day. (Patient taking differently: 2 g 4 (four) times a day. Diclofenac Sodium Gel 1 % Apply 2 gram  transdermally four times a day for Pain Apply to upper  extremities Do not exceed 32 g total per day)     docusate sodium (COLACE) 100 MG capsule Take 1 capsule (100 mg total) by mouth 2 (two) times a day as needed for constipation.     gabapentin (NEURONTIN) 100 MG capsule Take 100 mg by mouth 2 (two) times a day.     gabapentin (NEURONTIN) 100 MG capsule Take 200 mg by mouth at bedtime.     ibandronate (BONIVA) 150 mg tablet Take 150 mg by mouth every 30 (thirty) days. Give  150 mg by mouth one time a day every 1 month(s)  starting on the 20th for 28 day(s) related to AGERELATED  OSTEOPOROSIS WITHOUT CURRENT  PATHOLOGICAL FRACTURE (M81.0) Start on the  20th and end on the 20th every month. Take on  empty stomach,  30 minutes before breakfast with full  glass of water and must remain sitting up for 30  minutes     levothyroxine (SYNTHROID, LEVOTHROID) 137 MCG tablet Take 137 mcg by mouth daily.      lidocaine 4 % patch Place 1 patch on the skin daily. Remove and discard patch with 12 hours or as directed by MD.     losartan (COZAAR) 50 MG tablet Take 50 mg by mouth 2 (two) times a day.      MAGNESIUM HYDROXIDE, BULK, MISC Use 30 mL As Directed. Milk of Magnesia Suspension 1200 MG/15ML  (Magnesium Hydroxide) Give 30 ml by mouth as  needed for Constipation     melatonin 3 mg Tab tablet Take 3 mg by mouth at bedtime as needed.      multivitamin (MULTIPLE VITAMIN ORAL) Take 1 tablet by mouth daily.     nystatin (MYCOSTATIN) powder Apply topically 3 (three) times a day. Apply to Affected area topically three  times a day for Yeast     omeprazole (PRILOSEC) 20 MG capsule Take 20 mg by mouth 2 (two) times a day before meals.      peg 400-propylene glycol (SYSTANE) 0.4-0.3 % Drop Administer 1 drop to both eyes 4 (four) times a day as needed.     polyethylene glycol (MIRALAX) 17 gram packet Take 17 g by mouth daily.     simethicone (MYLICON) 80 MG chewable tablet Chew 80 mg 4 (four) times a day as needed for flatulence. Simethicone Tablet 80 MG Give 80 mg by mouth as  needed for Abdominal Distention 4 times daily after  meals and HS as needed     spironolactone (ALDACTONE) 25 MG tablet Take 0.5 tablets (12.5 mg total) by mouth daily. (Patient taking differently: Take 12.5 mg by mouth 2 (two) times a day. )     sucralfate (CARAFATE) 100 mg/mL suspension Take 1 g by mouth 2 (two) times a day.     triamcinolone (KENALOG) 0.1 % cream Apply thin layer to feet up to twice daily as needed (Patient taking differently: 2 (two) times a day as needed. Apply thin layer to feet up to twice daily as needed)         REVIEW OF SYSTEMS:    Currently, no fever, chills, or rigors. Does not have any visual or hearing problems. Denies any chest pain,  headaches, palpitations, lightheadedness, dizziness, shortness of breath, or cough. Appetite is good. Denies any GERD symptoms. Denies any difficulty with swallowing, nausea, or vomiting.  Denies any abdominal pain, diarrhea or constipation  . Denies any urinary symptoms. Reports insomnia. No active bleeding. No rash.       PHYSICAL EXAMINATION:  Vitals:    07/18/18 2021   BP: 160/85   Pulse: 93   Temp: 98.2  F (36.8  C)         GENERAL: Awake, Alert,  not in any form of acute distress, answers questions appropriately, follows simple commands, conversant  HEENT: Head is normocephalic with normal hair distribution. No evidence of trauma. Ears: No acute purulent discharge. Eyes: Conjunctivae pink with no scleral jaundice. Nose: Normal mucosa and septum.    CHEST: No tenderness or deformity, no crepitus  LUNG: Clear to auscultation with good chest expansion. There are no crackles or wheezes, normal AP diameter.  BACK: No kyphosis of the thoracic spine. Symmetric, no curvature, ROM normal, no CVA tenderness, no spinal tenderness   CVS: There is good S1  S2,  rhythm is regular.  ABDOMEN: Globular and soft, nontender to palpation, non distended, no masses, no organomegaly, good bowel sounds, no rebound or guarding, no peritoneal signs.   EXTREMITIES: Atraumatic. Full range of motion on both upper and lower extremities, there is no tenderness to palpation, has pedal edema, no cyanosis or clubbing, no calf tenderness, normal cap refill, no joint swelling.  SKIN: Warm and dry, no erythema noted, no rashes or lesions.  NEUROLOGICAL: The patient is oriented to person, place,  Strength and sensation are grossly intact. Face is symmetric.                    LABS:    Lab Results   Component Value Date    WBC 5.9 07/08/2018    HGB 13.3 07/08/2018    HCT 39.4 07/08/2018    MCV 95 07/08/2018     07/08/2018       Results for orders placed or performed during the hospital encounter of 07/04/18   Basic metabolic panel   Result  Value Ref Range    Sodium 138 136 - 145 mmol/L    Potassium 4.4 3.5 - 5.0 mmol/L    Chloride 102 98 - 107 mmol/L    CO2 30 22 - 31 mmol/L    Anion Gap, Calculation 6 5 - 18 mmol/L    Glucose 104 70 - 125 mg/dL    Calcium 8.9 8.5 - 10.5 mg/dL    BUN 11 8 - 28 mg/dL    Creatinine 0.59 (L) 0.60 - 1.10 mg/dL    GFR MDRD Af Amer >60 >60 mL/min/1.73m2    GFR MDRD Non Af Amer >60 >60 mL/min/1.73m2           Lab Results   Component Value Date    HGBA1C 6.0 12/11/2015     Vitamin D, Total (25-Hydroxy)   Date Value Ref Range Status   04/17/2017 28.1 (L) 30.0 - 80.0 ng/mL Final     Lab Results   Component Value Date    [id number] 920 (H) 05/01/2018       ASSESSMENT/PLAN:  1. Weakness of both lower extremities    2. Weakness      Weak, seen in therapy for rehab services. Was up ambulating with therapy and observed with slow steady gait, does report some fatigue.Therapy not released to be independent and will need standby assist for all transfers.  We will obtain labs for CBC, BMP and MG level.    MCS will follow throughout TCU stay.       Electronically signed by:  Nancy Daly CNP  This progress note was completed using Dragon software and there may be grammatical errors.    35  minutes spent of which greater than 75 % was face to face communication with the patient and nurse manager about above plan of care

## 2021-06-19 NOTE — PROGRESS NOTES
"Cardiology Progress Note    Assessment:  Hypertension,  labile with  pronounced orthostatic drops/autonomic dysfunction  Sinus bradycardia resolved after discontinuation of metoprolol  History of abdominal aortic aneurysm stenting  Aortic stenosis, mild to moderate  GERD      Plan:  Continue losartan and Spironolactone.  We will try to keep the blood pressure medications to minimum in order to avoid orthostatic symptoms.  Follow-up in 3 months  Subjective:   This is 78 y.o. female who comes in today for follow-up visit.  She was admitted to hospital with leg weakness.  She was seen by neurologist and started on Sinemet.  Metoprolol was discontinued because of mild sinus bradycardia.  She was started on amlodipine.  Amlodipine was later discontinued by TCU physician.  Today she has no complaints other than feeling weak    Review of Systems:   General: Weight Loss  Eyes: Visual Distubance  Ears/Nose/Throat: Hearing Loss  Lungs: WNL  Heart: WNL  Stomach: Constipation, Heartburn, Nausea  Bladder: Frequent Urination at Night  Muscle/Joints: Joint Pain, Muscle Weakness, Muscle Pain  Skin: WNL  Nervous System: Dizziness  Mental Health: Anxiety     Blood: WNL    Objective:   /80 (Patient Site: Right Arm, Patient Position: Sitting, Cuff Size: Adult Regular)  Pulse 72  Resp 16  Ht 5' 6\" (1.676 m)  Wt 155 lb (70.3 kg)  BMI 25.02 kg/m2  Physical Exam:  GENERAL: no distress  NECK: No JVD  LUNGS: Clear to auscultation.  CARDIAC: regular rhythm, S1 & S2 normal.  No heaves, thrills, gallops or murmurs.  ABDOMEN: flat, negative hepatosplenomegaly, soft and non-tender.  EXTREMITIES: No evidence of cyanosis, clubbing or edema.    Current Outpatient Prescriptions   Medication Sig Dispense Refill     acetaminophen (TYLENOL) 500 MG tablet Take 2 tablets (1,000 mg total) by mouth 3 (three) times a day. (Patient taking differently: Take 1,000 mg by mouth 3 (three) times a day. )  0     aluminum-magnesium hydroxide 200-200 mg/5 " mL suspension Take 10 mL by mouth every 6 (six) hours as needed for indigestion. (Patient taking differently: Take 5 mL by mouth every 4 (four) hours as needed for indigestion. ) 200 mL 1     bisacodyl (DULCOLAX) 10 mg suppository Insert 10 mg into the rectum daily as needed.       carbidopa-levodopa (SINEMET)  mg per tablet Take 2 tablets by mouth 3 (three) times a day. (Patient taking differently: Take 2 tablets by mouth 3 (three) times a day. Give 200 mg  by mouth three times a day related to WEAKNESS)  0     citalopram (CELEXA) 20 MG tablet Take 1.5 tablets (30 mg total) by mouth Daily after lunch.  0     diclofenac sodium (VOLTAREN) 1 % Gel Lower extremities: 4 g to the affected area 4 times daily. Upper extremities: 2 g to the affected area 4 times daily. Do not exceed 32 g total dose per day. (Patient taking differently: 2 g 4 (four) times a day. Diclofenac Sodium Gel 1 % Apply 2 gram  transdermally four times a day for Pain Apply to upper  extremities Do not exceed 32 g total per day)  0     gabapentin (NEURONTIN) 100 MG capsule Take 100 mg by mouth 2 (two) times a day.       gabapentin (NEURONTIN) 100 MG capsule Take 200 mg by mouth at bedtime.       levothyroxine (SYNTHROID, LEVOTHROID) 137 MCG tablet Take 137 mcg by mouth daily.        lidocaine 4 % patch Place 1 patch on the skin daily. Remove and discard patch with 12 hours or as directed by MD.       losartan (COZAAR) 50 MG tablet Take 50 mg by mouth 2 (two) times a day.        MAGNESIUM HYDROXIDE, BULK, MISC Use 30 mL As Directed. Milk of Magnesia Suspension 1200 MG/15ML  (Magnesium Hydroxide) Give 30 ml by mouth as  needed for Constipation       melatonin 3 mg Tab tablet Take 3 mg by mouth at bedtime as needed.        multivitamin (MULTIPLE VITAMIN ORAL) Take 1 tablet by mouth daily.       nystatin (MYCOSTATIN) powder Apply topically 3 (three) times a day. Apply to Affected area topically three  times a day for Yeast       omeprazole (PRILOSEC)  20 MG capsule Take 20 mg by mouth 2 (two) times a day before meals.        peg 400-propylene glycol (SYSTANE) 0.4-0.3 % Drop Administer 1 drop to both eyes 4 (four) times a day as needed.       polyethylene glycol (MIRALAX) 17 gram packet Take 17 g by mouth daily.       ranitidine (ZANTAC) 150 MG tablet Take 150 mg by mouth at bedtime.       senna (SENOKOT) 8.6 mg tablet Take 1 tablet by mouth 2 (two) times a day.       simethicone (MYLICON) 80 MG chewable tablet Chew 80 mg 4 (four) times a day as needed for flatulence. Simethicone Tablet 80 MG Give 80 mg by mouth as  needed for Abdominal Distention 4 times daily after  meals and HS as needed       spironolactone (ALDACTONE) 25 MG tablet Take 0.5 tablets (12.5 mg total) by mouth daily. (Patient taking differently: Take 12.5 mg by mouth 2 (two) times a day. ) 30 tablet 1     triamcinolone (KENALOG) 0.1 % cream Apply thin layer to feet up to twice daily as needed (Patient taking differently: 2 (two) times a day as needed. Apply thin layer to feet up to twice daily as needed) 30 g 0     CALCIUM CITRATE ORAL Take 500 mg by mouth daily.       cholecalciferol, vitamin D3, 1,000 unit tablet Take 2,000 Units by mouth daily.        sucralfate (CARAFATE) 100 mg/mL suspension Take 1 g by mouth 2 (two) times a day.       No current facility-administered medications for this visit.        Cardiographics:    ECG: Sinus rhythm LVH voltage      Holter: June 2017  Normal      Echocardiogram: May 2017    Left Ventricle: Normal size.The calculated left ventricular ejection fraction is 71%. This represents a normal ejection fraction. Moderate hypertrophy noted. E/e' > 15, suggesting elevated LV filling pressures.    Aortic Valve: The valve is tricuspid. There is mild global calcification with reduced excursion of the aortic valve present. Mild to moderate stenosis.    Texture of myocardium and small pericardial effusion raises possibility of cardiac amyloidosis      Stress  Test: November 2015  1. Lexiscan stress nuclear study is negative for inducible myocardial   ischemia or infarction.     Lab Results:       Lab Results   Component Value Date    CHOL 256 (H) 09/22/2015    CHOL 241 (H) 03/10/2015    CHOL 252 (H) 11/10/2014     Lab Results   Component Value Date    HDL 43 09/22/2015    HDL 43 03/10/2015    HDL 35 (L) 11/10/2014     Lab Results   Component Value Date    LDLCALC 195 (H) 09/22/2015    LDLCALC 168 (H) 03/10/2015    LDLCALC 174 (H) 11/10/2014     Lab Results   Component Value Date    TRIG 91 09/22/2015    TRIG 149 03/10/2015    TRIG 216 (H) 11/10/2014     BNP   Date Value Ref Range Status   10/25/2015 69 0 - 137 pg/mL Final       Parag (Hector)  MD Adriane

## 2021-06-19 NOTE — PROGRESS NOTES
Patient's daughter called in requesting help from the  to transfer patient to another TCU. The TCU  should be coordinating this and sending pt's information to other TCU. Left Mi a message with this information.

## 2021-06-19 NOTE — PROGRESS NOTES
[facility]      Code Status:  DNR/DNI/requesting selective treatment and comfort focused treatments only  Visit Type: Review Of Multiple Medical Conditions     Facility:  [facility] [[id number]]           History of Present Illness: Mehreen Camara is a 78 y.o. female who is currently admitted to the TCU as a transfer from the hospital.  As per the history this is a elderly patient who lives in independent living facility with the past medical history significant for severe orthostatic hypotension and chronic back and SI joint pain with venous stasis and AAA status post stent with moderate aortic stenoses ; hypothyroidism;  hyperlipidemia.  She presented with increasing leg weakness   She was recently in the TCU with bilateral lower extremity weakness and was discharged back and now is back with similar symptoms again the hospital.  She has had extensive neurological workup in May 2018 with no finding.  She had MRI brain and cervical spine imaging which did show multilevel cervical spine degenerative changes but no change otherwise  She was seen by neurology and they have initiated Sinemet for presumed Parkinson's and recommended PT OT.  they recommended she see a movement disorder clinic  She also has a history of orthostatic hypotension with labile hypertension.  She was experiencing bradycardia in the hospital associated with hypotension.  She was on beta-blockers.  Workup was done in the hospital there was no evidence of adrenal insufficiency.  On telemetry she did have sinus bradycardia with heart rates dropping down to the 40s her metoprolol was discontinued and amlodipine has been prescribed for blood pressure management at a low-dose of 2.5 mg.  She continues with her losartan and spironolactone and wants to see cardiology.  Patient is requesting no change in her cardiac meds without consultation with her cardiologist in the TCU.  She continues to exhibit  significant anxiety with adjustment disorder and her Celexa dosage was increased to 30 mg . melatonin was added.  She also has a chronic history of neck back abdominal pain and is narcotic dependent she got multiple doses of oxycodone in the hospital  Pain management was consulted and her opiates have been discontinued they recommended supportive treatment with lidocaine patch diclofenac gel and Tylenol.  She continues to complain of severe back pain .  She is having some abdominal distress ongoing with constipation difficulty urinating and wondering if she should have an EGD.  Nursing again requested me to review her blood pressures.  She had systolics greater than 180 this morning subsequently she experienced a greater than 50 point drop and her blood pressures were hovering between 117-98.  She becomes dizzy and lightheaded she told me that this is a frequent occurrence. bp remains variable and low often  No improvement in abd pain noted -she has a GI follow-up due to frequent concerns about constipation and abdominal distention.      Past Medical History:   Diagnosis Date     AAA (abdominal aortic aneurysm) (H)      Acute encephalopathy 9/26/2015     Adrenal adenoma      Harris esophagus      Constipation      Depression      GERD (gastroesophageal reflux disease)      Hiatal hernia      HLD (hyperlipidemia)      HTN (hypertension)      Hypothyroid      Murmur      Osteoarthritis      Retinal Migraine Headache      S/P AAA repair using bifurcation graft 11/30/2015     Scoliosis      Thyroid nodule     removed nodules and thyroid     Past Surgical History:   Procedure Laterality Date     JOINT REPLACEMENT       AZ ARTHRODESIS ANT INTERBODY MIN DISCECTOMY, CERVICAL BELOW C2      Description: Cervical Vertebral Fusion;  Recorded: 01/21/2013;  Comments: 1981     AZ REVISE MEDIAN N/CARPAL TUNNEL SURG      Description: Neuroplasty Decompression Median Nerve At Carpal Tunnel;  Recorded: 01/21/2013;  Comments:  bilateral     NM THYROIDECTOMY      Description: Thyroid Surgery Total Thyroidectomy;  Recorded: 06/08/2011;     NM TOTAL ABDOM HYSTERECTOMY  1985    Description: Hysterectomy;  Recorded: 01/21/2013;     NM TOTAL HIP ARTHROPLASTY Right     Description: Total Hip Replacement;  Recorded: 01/21/2013;  Comments: right, 2003     Family History   Problem Relation Age of Onset     Heart disease Mother      Hypertension Mother      Heart disease Father      Cancer Brother 57     colon     Hypertension Daughter      Hypertension Daughter      Neuropathy Daughter      Lupus Daughter      Heart disease Paternal Aunt      Heart disease Paternal Uncle      Parkinsonism Paternal Uncle      Social History     Social History     Marital status:      Spouse name: N/A     Number of children: N/A     Years of education: N/A     Occupational History     Not on file.     Social History Main Topics     Smoking status: Former Smoker     Types: Cigarettes     Quit date: 12/11/1994     Smokeless tobacco: Never Used     Alcohol use No     Drug use: No     Sexual activity: Not on file     Other Topics Concern     Not on file     Social History Narrative     Current Outpatient Prescriptions   Medication Sig Dispense Refill     acetaminophen (TYLENOL) 500 MG tablet Take 2 tablets (1,000 mg total) by mouth 3 (three) times a day. (Patient taking differently: Take 1,000 mg by mouth 3 (three) times a day. )  0     aluminum-magnesium hydroxide 200-200 mg/5 mL suspension Take 10 mL by mouth every 6 (six) hours as needed for indigestion. (Patient taking differently: Take 5 mL by mouth every 4 (four) hours as needed for indigestion. ) 200 mL 1     amLODIPine (NORVASC) 2.5 MG tablet Take 1 tablet (2.5 mg total) by mouth daily. Hold for sbp<110  0     bisacodyl (DULCOLAX) 10 mg suppository Insert 10 mg into the rectum daily as needed.       CALCIUM CITRATE ORAL Take 500 mg by mouth daily.       carbidopa-levodopa (SINEMET)  mg per tablet  Take 2 tablets by mouth 3 (three) times a day. (Patient taking differently: Take 2 tablets by mouth 3 (three) times a day. Give 200 mg  by mouth three times a day related to WEAKNESS)  0     cholecalciferol, vitamin D3, 1,000 unit tablet Take 2,000 Units by mouth daily.        citalopram (CELEXA) 20 MG tablet Take 1.5 tablets (30 mg total) by mouth Daily after lunch.  0     diclofenac sodium (VOLTAREN) 1 % Gel Lower extremities: 4 g to the affected area 4 times daily. Upper extremities: 2 g to the affected area 4 times daily. Do not exceed 32 g total dose per day. (Patient taking differently: 2 g 4 (four) times a day. Diclofenac Sodium Gel 1 % Apply 2 gram  transdermally four times a day for Pain Apply to upper  extremities Do not exceed 32 g total per day)  0     docusate sodium (COLACE) 100 MG capsule Take 1 capsule (100 mg total) by mouth 2 (two) times a day as needed for constipation. 30 capsule 0     gabapentin (NEURONTIN) 100 MG capsule Take 100 mg by mouth 2 (two) times a day.       gabapentin (NEURONTIN) 100 MG capsule Take 200 mg by mouth at bedtime.       ibandronate (BONIVA) 150 mg tablet Take 150 mg by mouth every 30 (thirty) days. Give  150 mg by mouth one time a day every 1 month(s)  starting on the 20th for 28 day(s) related to AGERELATED  OSTEOPOROSIS WITHOUT CURRENT  PATHOLOGICAL FRACTURE (M81.0) Start on the  20th and end on the 20th every month. Take on  empty stomach, 30 minutes before breakfast with full  glass of water and must remain sitting up for 30  minutes       levothyroxine (SYNTHROID, LEVOTHROID) 137 MCG tablet Take 137 mcg by mouth daily.        lidocaine 4 % patch Place 1 patch on the skin daily. Remove and discard patch with 12 hours or as directed by MD.       losartan (COZAAR) 50 MG tablet Take 50 mg by mouth 2 (two) times a day.        MAGNESIUM HYDROXIDE, BULK, MISC Use 30 mL As Directed. Milk of Magnesia Suspension 1200 MG/15ML  (Magnesium Hydroxide) Give 30 ml by mouth  as  needed for Constipation       melatonin 3 mg Tab tablet Take 3 mg by mouth at bedtime as needed.        multivitamin (MULTIPLE VITAMIN ORAL) Take 1 tablet by mouth daily.       nystatin (MYCOSTATIN) powder Apply topically 3 (three) times a day. Apply to Affected area topically three  times a day for Yeast       omeprazole (PRILOSEC) 20 MG capsule Take 20 mg by mouth 2 (two) times a day before meals.        peg 400-propylene glycol (SYSTANE) 0.4-0.3 % Drop Administer 1 drop to both eyes 4 (four) times a day as needed.       polyethylene glycol (MIRALAX) 17 gram packet Take 17 g by mouth daily.       simethicone (MYLICON) 80 MG chewable tablet Chew 80 mg 4 (four) times a day as needed for flatulence. Simethicone Tablet 80 MG Give 80 mg by mouth as  needed for Abdominal Distention 4 times daily after  meals and HS as needed       spironolactone (ALDACTONE) 25 MG tablet Take 0.5 tablets (12.5 mg total) by mouth daily. (Patient taking differently: Take 12.5 mg by mouth 2 (two) times a day. ) 30 tablet 1     sucralfate (CARAFATE) 100 mg/mL suspension Take 1 g by mouth 2 (two) times a day.       triamcinolone (KENALOG) 0.1 % cream Apply thin layer to feet up to twice daily as needed (Patient taking differently: 2 (two) times a day as needed. Apply thin layer to feet up to twice daily as needed) 30 g 0     No current facility-administered medications for this visit.      Allergies   Allergen Reactions     Penicillins Anaphylaxis, Shortness Of Breath and Rash     Aspirin Nausea Only     Atenolol Cough     Atorvastatin Myalgia     Bupropion Nausea Only     Cleocin [Clindamycin Hcl] Other (See Comments)     Constipation      Codeine Nausea Only     Ibuprofen Nausea Only     Stomach upset     Lovastatin Myalgia     Cephalexin Rash     Neck reddness     Review of Systems:    Constitutional: Negative.  Negative for fever, chills,has  activity change, appetite change and fatigue.   She experiences significant orthostatic drop  in her blood pressure greater than 50 points in sbp  HENT: Negative for congestion and facial swelling.    Eyes: Negative for photophobia, redness and visual disturbance.   Respiratory: Negative for cough and chest tightness.    Cardiovascular: Negative for chest pain, palpitations and HAS leg swelling.   Gastrointestinal: Negative for nausea, diarrhea, she has chronic constipation,no blood in stool and abdominal distention.   Complaining of abdominal bloating  She is now been  complaining of intermittent right lower quadrant abdominal pain with cramping .  Genitourinary: Negative.  y  Musculoskeletal: Negative.    Complaining of low back pain   Skin: Negative.    Neurological: Negative for dizziness, tremors, syncope, weakness, light-headedness and headaches.   Hematological: Does not bruise/bleed easily.   Psychiatric/Behavioral: Negative.  Remains anxious    bp 132/71 ;t98 p55    Physical Exam:    GENERAL: no acute distress. Cooperative in conversation.   HEENT: pupils are equal, round and reactive. Oral mucosa is moist and intact.  RESP:Chest symmetric. Regular respiratory rate. No stridor.  CVS: S1S2; she has an ejection murmur  ABD: Nondistended, soft.  No rebound no guarding but tender to deep palpation in the right lower quadrant  EXTREMITIES: Trace lower extremity edema.  She has limited abduction in her left shoulder due to an old injury.  Right shoulder has good range of movement limited power and strength noted in the bilateral lower extremities  NEURO: non focal. Alert and oriented x3.   PSYCH: within normal limits. No depression ;has anxiety.  SKIN: warm dry intact     Labs:      Results for orders placed or performed during the hospital encounter of 07/04/18   Basic metabolic panel   Result Value Ref Range    Sodium 138 136 - 145 mmol/L    Potassium 4.4 3.5 - 5.0 mmol/L    Chloride 102 98 - 107 mmol/L    CO2 30 22 - 31 mmol/L    Anion Gap, Calculation 6 5 - 18 mmol/L    Glucose 104 70 - 125 mg/dL     Calcium 8.9 8.5 - 10.5 mg/dL    BUN 11 8 - 28 mg/dL    Creatinine 0.59 (L) 0.60 - 1.10 mg/dL    GFR MDRD Af Amer >60 >60 mL/min/1.73m2    GFR MDRD Non Af Amer >60 >60 mL/min/1.73m2       Assessment/Plan:    Bilateral leg weakness s She has a history of repetitive bilateral lower extremity weakness  Associated with that she had severe low back pain.  Neurology suspect she has some neurodegenerative disorder not ruling out Parkinson's welt versus multisystem atrophy.  Repeat imaging included MRI brain and C-spine did not show any new changes she does have multilevel C-spine degenerative changes but no progress since 4/2018  Discharge for PT OT and rehab with outpatient follow-up with neurology.  Ambulating currently using a walker  She has been initiated on Sinemet and recommended to see a movement disorder clinic.  She is seen ambulating with a walker    Acute back pain.  She has a history of acute on chronic pain in her back neck and legs..  Patient has been opioid dependent and it was reviewed that on her imaging she does have multilevel degenerative changes  Pain clinic saw her and recommended weaning her off her narcotics and trying Tylenol diclofenac gel and topical Lidoderm patch.  Also initiated on gabapentin .    Severe orthostatic hypotension which is a long-standing concern.  She has had labile blood pressures in the TCU as well as in the hospital    Seen by cardiology and taken off her fludrocortisone and given a low-dose of spironolactone at 12.5 daily.  It seems she has a significant orthostatic blood pressure drop of greater than 50 systolic blood pressures.  She dropped from 180-90 this morning.  Subsequently she did rebound 110 so it is high in the morning and subsequently very low at nighttime and the rest of the day.  She is not going to be compliant with Tubigrip's or teds or any other compression hose.  Plan is to continue spironolactone DC her amlodipine and hold losartan for systolic blood  pressure less than 160 and monitor her response.  Blood pressures continued to fluctuate widely    Sinus bradycardia with heart rates down to the 50s and 40s  Patient had some workup done and her cortisol level was 10 with no evidence of adrenal insufficiency  Felt to be medication induced most likely from metoprolol which was discontinued .  Advised to follow-up with cardiology as an outpatient.  She remains asymptomatic.    Hypothyroidism on replacement Synthroid    Mood disorder with significant anxiety being reported by patient with repetitive concerns   Discharge on a higher dose of Celexa 30 mg melatonin added for insomnia concerns    Chronic constipation /IBS-continue with his stool regimen  Add senna as to her regimen on a scheduled basis now.    GERD on a PPI    History of insomnia on melatonin .    Abdominal pain primarily in the right lower quadrant unclear etiology -  GI referral given for EGD  No improvement noted    Cognitive impairment with confusion noted in the hospital.  Patient will take her evening meds as per the hospital and then repeat the medications again and her confusion she would repeat doses of losartan metoprolol Prilosec and spironolactone so it is felt that she is no longer safe to self administer medication and they recommended a home care RN to go home with her  We will await her cognitive testing and medication safety evaluation.    History of AAA status post bifurcated stent graft repair her last CTA done on 4/2016 was stable  Osteoporosis she gets Boniva but is now on wanting to discontinue the medication. she is also on calcium supplement.    Vitamin D deficiency on supplementation  Debilitation-quite profound she is here for PT OT and rehab requesting a DNR/DNI CODE STATUS  She is awaiting placement in assisted living facility and probably will discharge with more services and upcoming cardiology as well as appointment to see GI.    Electronically signed by: Ne Quintana  ANNIKA  This progress note was completed using Dragon software and there may be grammatical errors.

## 2021-06-19 NOTE — PROGRESS NOTES
[facility]      NAME:  Mehreen Camara             :  1940    MRN: [id number]    CODE STATUS:  DNR/DNI    FACILITY: [facility] [[id number]]         CHIEF COMPLAIN/REASON FOR VISIT:  Chief Complaint   Patient presents with     Problem Visit       HISTORY OF PRESENT ILLNESS: Mehreen Camara is a 78 y.o. female being seen today at request of nursing for low blood pressure. Prior pt in TCU, dc last May with [facility] services. Then, on  she went to the ER at [facility] with extremely  weakness. She was dc to a TCU in [facility], on  but now transferred to this TCU. PMH includes:  severe orthostatic hypotension and chronic back and SI joint pain with venous stasis and AAA status post stent with moderate aortic stenoses ; hypothyroidism;  hyperlipidemia.  She presented with increasing leg weakness for 1 week after undergoing an SI joint steroid injection, with increasing bilateral lower extremity leg weakness with some gait instability noted.  Apparently she has had similar issues in the past after getting an SI joint injection. She is not a good historian and tends to wander during her ROS and assessment.   Mehreen is seen today in her room, per nursing she had blood pressure 99 over 60s and a recheck of 105 over 60s, she is asymptomatic and denies dizziness, chest pains, palpitations.  She had a recent change in her medications due to bradycardia.  Her blood pressures are typically 130-150s over 80s however she does have severe orthostatic hypotension and was worked up in the hospital for this.   Her only physical complaint today is constipation with hard stools, she takes MiraLAX daily but she is still having trouble with stools.    Allergies   Allergen Reactions     Penicillins Anaphylaxis, Shortness Of Breath and Rash     Aspirin Nausea Only     Atenolol Cough     Atorvastatin Myalgia     Bupropion Nausea Only     Cleocin [Clindamycin Hcl] Other (See  Comments)     Constipation      Codeine Nausea Only     Ibuprofen Nausea Only     Stomach upset     Lovastatin Myalgia     Cephalexin Rash     Neck reddness   :     Current Outpatient Prescriptions   Medication Sig     acetaminophen (TYLENOL) 500 MG tablet Take 2 tablets (1,000 mg total) by mouth 3 (three) times a day. (Patient taking differently: Take 1,000 mg by mouth 3 (three) times a day. )     aluminum-magnesium hydroxide 200-200 mg/5 mL suspension Take 10 mL by mouth every 6 (six) hours as needed for indigestion. (Patient taking differently: Take 5 mL by mouth every 4 (four) hours as needed for indigestion. )     amLODIPine (NORVASC) 2.5 MG tablet Take 1 tablet (2.5 mg total) by mouth daily. Hold for sbp<110     bisacodyl (DULCOLAX) 10 mg suppository Insert 10 mg into the rectum daily as needed.     CALCIUM CITRATE ORAL Take 500 mg by mouth daily.     carbidopa-levodopa (SINEMET)  mg per tablet Take 2 tablets by mouth 3 (three) times a day. (Patient taking differently: Take 2 tablets by mouth 3 (three) times a day. Give 200 mg  by mouth three times a day related to WEAKNESS)     cholecalciferol, vitamin D3, 1,000 unit tablet Take 2,000 Units by mouth daily.      citalopram (CELEXA) 20 MG tablet Take 1.5 tablets (30 mg total) by mouth Daily after lunch.     diclofenac sodium (VOLTAREN) 1 % Gel Lower extremities: 4 g to the affected area 4 times daily. Upper extremities: 2 g to the affected area 4 times daily. Do not exceed 32 g total dose per day. (Patient taking differently: 2 g 4 (four) times a day. Diclofenac Sodium Gel 1 % Apply 2 gram  transdermally four times a day for Pain Apply to upper  extremities Do not exceed 32 g total per day)     docusate sodium (COLACE) 100 MG capsule Take 1 capsule (100 mg total) by mouth 2 (two) times a day as needed for constipation.     gabapentin (NEURONTIN) 100 MG capsule Take 100 mg by mouth 2 (two) times a day.     gabapentin (NEURONTIN) 100 MG capsule Take 200 mg  by mouth at bedtime.     ibandronate (BONIVA) 150 mg tablet Take 150 mg by mouth every 30 (thirty) days. Give  150 mg by mouth one time a day every 1 month(s)  starting on the 20th for 28 day(s) related to AGERELATED  OSTEOPOROSIS WITHOUT CURRENT  PATHOLOGICAL FRACTURE (M81.0) Start on the  20th and end on the 20th every month. Take on  empty stomach, 30 minutes before breakfast with full  glass of water and must remain sitting up for 30  minutes     levothyroxine (SYNTHROID, LEVOTHROID) 137 MCG tablet Take 137 mcg by mouth daily.      lidocaine 4 % patch Place 1 patch on the skin daily. Remove and discard patch with 12 hours or as directed by MD.     losartan (COZAAR) 50 MG tablet Take 50 mg by mouth 2 (two) times a day.      MAGNESIUM HYDROXIDE, BULK, MISC Use 30 mL As Directed. Milk of Magnesia Suspension 1200 MG/15ML  (Magnesium Hydroxide) Give 30 ml by mouth as  needed for Constipation     melatonin 3 mg Tab tablet Take 3 mg by mouth at bedtime as needed.      multivitamin (MULTIPLE VITAMIN ORAL) Take 1 tablet by mouth daily.     nystatin (MYCOSTATIN) powder Apply topically 3 (three) times a day. Apply to Affected area topically three  times a day for Yeast     omeprazole (PRILOSEC) 20 MG capsule Take 20 mg by mouth 2 (two) times a day before meals.      peg 400-propylene glycol (SYSTANE) 0.4-0.3 % Drop Administer 1 drop to both eyes 4 (four) times a day as needed.     polyethylene glycol (MIRALAX) 17 gram packet Take 17 g by mouth daily.     simethicone (MYLICON) 80 MG chewable tablet Chew 80 mg 4 (four) times a day as needed for flatulence. Simethicone Tablet 80 MG Give 80 mg by mouth as  needed for Abdominal Distention 4 times daily after  meals and HS as needed     spironolactone (ALDACTONE) 25 MG tablet Take 0.5 tablets (12.5 mg total) by mouth daily. (Patient taking differently: Take 12.5 mg by mouth 2 (two) times a day. )     sucralfate (CARAFATE) 100 mg/mL suspension Take 1 g by mouth 2 (two) times a  day.     triamcinolone (KENALOG) 0.1 % cream Apply thin layer to feet up to twice daily as needed (Patient taking differently: 2 (two) times a day as needed. Apply thin layer to feet up to twice daily as needed)         REVIEW OF SYSTEMS:    Currently, no fever, chills, or rigors. Does not have any visual or hearing problems. Denies any chest pain, headaches, palpitations, lightheadedness, dizziness, shortness of breath, or cough. Appetite is good. Denies any GERD symptoms. Denies any difficulty with swallowing, nausea, or vomiting.  Denies any abdominal pain, diarrhea. Denies any urinary symptoms. Reports insomnia. No active bleeding. No rash.   Endorses constipation.    PHYSICAL EXAMINATION:  Vitals:    07/20/18 1114   BP: 99/60   Pulse: 66   Resp: 18   Temp: 97.5  F (36.4  C)   SpO2: 97%   Weight: 151 lb 9.6 oz (68.8 kg)         Physical Exam   General appearance: alert, appears stated age and cooperative.   Head: Normocephalic, without obvious abnormality, atraumatic, Eyes: sclera anicteric.  Lungs: respirations without effort, LS CTA; no wheezing or rales.   Cardiovascular: S1, S2. Regular rate and rhythm.   ABDOMEN: Globular and soft, non tender.    Extremities: extremities normal, atraumatic, no cyanosis or edema  Skin: Skin warm and dry, turgor normal. No rashes or lesions  Neurologic: oriented. No focal deficits.   Psych: interacts well with caregivers, exhibits logical thought processes and connections, pleasant      LABS:    Lab Results   Component Value Date    WBC 5.9 07/08/2018    HGB 13.3 07/08/2018    HCT 39.4 07/08/2018    MCV 95 07/08/2018     07/08/2018       Results for orders placed or performed during the hospital encounter of 07/04/18   Basic metabolic panel   Result Value Ref Range    Sodium 138 136 - 145 mmol/L    Potassium 4.4 3.5 - 5.0 mmol/L    Chloride 102 98 - 107 mmol/L    CO2 30 22 - 31 mmol/L    Anion Gap, Calculation 6 5 - 18 mmol/L    Glucose 104 70 - 125 mg/dL    Calcium  8.9 8.5 - 10.5 mg/dL    BUN 11 8 - 28 mg/dL    Creatinine 0.59 (L) 0.60 - 1.10 mg/dL    GFR MDRD Af Amer >60 >60 mL/min/1.73m2    GFR MDRD Non Af Amer >60 >60 mL/min/1.73m2           Lab Results   Component Value Date    HGBA1C 6.0 12/11/2015     Vitamin D, Total (25-Hydroxy)   Date Value Ref Range Status   04/17/2017 28.1 (L) 30.0 - 80.0 ng/mL Final     Lab Results   Component Value Date    [id number] 920 (H) 05/01/2018       ASSESSMENT/PLAN:  1. Hypertension      1.  I will put parameters on amlodipine for hold for less than 100 systolic blood pressure.  We will continue to monitor blood pressures every shift and reevaluate next week.  Per nursing she is taking in good fluids.   2. Constipation: Start Colace 1 by mouth every day.  Bowel protocol per facility.    MCS will follow throughout TCU stay.       Electronically signed by:  Nancy Daly  This progress note was completed using Dragon software and there may be grammatical errors.    25  minutes spent of which greater than 75 % was face to face communication with the patient and nurse manager about above plan of care

## 2021-06-19 NOTE — PROGRESS NOTES
[facility]      Code Status:  DNR/DNI/requesting selective treatment and comfort focused treatments only  Visit Type: Review Of Multiple Medical Conditions     Facility:  [facility] [[id number]]           History of Present Illness: Mehreen Camara is a 78 y.o. female who is currently admitted to the TCU as a transfer from the hospital.  As per the history this is a elderly patient who lives in independent living facility with the past medical history significant for severe orthostatic hypotension and chronic back and SI joint pain with venous stasis and AAA status post stent with moderate aortic stenoses ; hypothyroidism;  hyperlipidemia.  She presented with increasing leg weakness   She was recently in the TCU with bilateral lower extremity weakness and was discharged back and now is back with similar symptoms again the hospital.  She has had extensive neurological workup in May 2018 with no finding.  She had MRI brain and cervical spine imaging which did show multilevel cervical spine degenerative changes but no change otherwise  She was seen by neurology and they have initiated Sinemet for presumed Parkinson's and recommended PT OT.  they recommended she see a movement disorder clinic  She also has a history of orthostatic hypotension with labile hypertension.  She was experiencing bradycardia in the hospital associated with hypotension.  She was on beta-blockers.  Workup was done in the hospital there was no evidence of adrenal insufficiency.  On telemetry she did have sinus bradycardia with heart rates dropping down to the 40s her metoprolol was discontinued and amlodipine has been prescribed for blood pressure management at a low-dose of 2.5 mg.  She continues with her losartan and spironolactone and wants to see cardiology.  Patient is requesting no change in her cardiac meds without consultation with her cardiologist in the TCU.  She continues to exhibit  significant anxiety with adjustment disorder and her Celexa dosage was increased to 30 mg . melatonin was added.  She also has a chronic history of neck back abdominal pain and is narcotic dependent she got multiple doses of oxycodone in the hospital  Pain management was consulted and her opiates have been discontinued they recommended supportive treatment with lidocaine patch diclofenac gel and Tylenol.  She continues to complain of severe back pain .  She is having some abdominal distress ongoing with constipation difficulty urinating and wondering if she should have an EGD.  Nursing again requested me to review her blood pressures.  She had systolics greater than 180 this morning subsequently she experienced a greater than 50 point drop and her blood pressures were hovering between 117-98.  She becomes dizzy and lightheaded she told me that this is a frequent occurrence.  Unfortunately not very compliant and does not want to wear teds Tubigrip's or any kind of compression hose either  She is requesting me to hold some of her medications wondering if that is causing her dyspepsia-like symptoms      Past Medical History:   Diagnosis Date     AAA (abdominal aortic aneurysm) (H)      Acute encephalopathy 9/26/2015     Adrenal adenoma      Harris esophagus      Constipation      Depression      GERD (gastroesophageal reflux disease)      Hiatal hernia      HLD (hyperlipidemia)      HTN (hypertension)      Hypothyroid      Murmur      Osteoarthritis      Retinal Migraine Headache      S/P AAA repair using bifurcation graft 11/30/2015     Scoliosis      Thyroid nodule     removed nodules and thyroid     Past Surgical History:   Procedure Laterality Date     JOINT REPLACEMENT       GA ARTHRODESIS ANT INTERBODY MIN DISCECTOMY, CERVICAL BELOW C2      Description: Cervical Vertebral Fusion;  Recorded: 01/21/2013;  Comments: 1981     GA REVISE MEDIAN N/CARPAL TUNNEL SURG      Description: Neuroplasty Decompression Median  Nerve At Carpal Tunnel;  Recorded: 01/21/2013;  Comments: bilateral     FL THYROIDECTOMY      Description: Thyroid Surgery Total Thyroidectomy;  Recorded: 06/08/2011;     FL TOTAL ABDOM HYSTERECTOMY  1985    Description: Hysterectomy;  Recorded: 01/21/2013;     FL TOTAL HIP ARTHROPLASTY Right     Description: Total Hip Replacement;  Recorded: 01/21/2013;  Comments: right, 2003     Family History   Problem Relation Age of Onset     Heart disease Mother      Hypertension Mother      Heart disease Father      Cancer Brother 57     colon     Hypertension Daughter      Hypertension Daughter      Neuropathy Daughter      Lupus Daughter      Heart disease Paternal Aunt      Heart disease Paternal Uncle      Parkinsonism Paternal Uncle      Social History     Social History     Marital status:      Spouse name: N/A     Number of children: N/A     Years of education: N/A     Occupational History     Not on file.     Social History Main Topics     Smoking status: Former Smoker     Types: Cigarettes     Quit date: 12/11/1994     Smokeless tobacco: Never Used     Alcohol use No     Drug use: No     Sexual activity: Not on file     Other Topics Concern     Not on file     Social History Narrative     Current Outpatient Prescriptions   Medication Sig Dispense Refill     acetaminophen (TYLENOL) 500 MG tablet Take 2 tablets (1,000 mg total) by mouth 3 (three) times a day. (Patient taking differently: Take 1,000 mg by mouth 3 (three) times a day. )  0     aluminum-magnesium hydroxide 200-200 mg/5 mL suspension Take 10 mL by mouth every 6 (six) hours as needed for indigestion. (Patient taking differently: Take 5 mL by mouth every 4 (four) hours as needed for indigestion. ) 200 mL 1     amLODIPine (NORVASC) 2.5 MG tablet Take 1 tablet (2.5 mg total) by mouth daily. Hold for sbp<110  0     bisacodyl (DULCOLAX) 10 mg suppository Insert 10 mg into the rectum daily as needed.       CALCIUM CITRATE ORAL Take 500 mg by mouth daily.        carbidopa-levodopa (SINEMET)  mg per tablet Take 2 tablets by mouth 3 (three) times a day. (Patient taking differently: Take 2 tablets by mouth 3 (three) times a day. Give 200 mg  by mouth three times a day related to WEAKNESS)  0     cholecalciferol, vitamin D3, 1,000 unit tablet Take 2,000 Units by mouth daily.        citalopram (CELEXA) 20 MG tablet Take 1.5 tablets (30 mg total) by mouth Daily after lunch.  0     diclofenac sodium (VOLTAREN) 1 % Gel Lower extremities: 4 g to the affected area 4 times daily. Upper extremities: 2 g to the affected area 4 times daily. Do not exceed 32 g total dose per day. (Patient taking differently: 2 g 4 (four) times a day. Diclofenac Sodium Gel 1 % Apply 2 gram  transdermally four times a day for Pain Apply to upper  extremities Do not exceed 32 g total per day)  0     docusate sodium (COLACE) 100 MG capsule Take 1 capsule (100 mg total) by mouth 2 (two) times a day as needed for constipation. 30 capsule 0     gabapentin (NEURONTIN) 100 MG capsule Take 100 mg by mouth 2 (two) times a day.       gabapentin (NEURONTIN) 100 MG capsule Take 200 mg by mouth at bedtime.       ibandronate (BONIVA) 150 mg tablet Take 150 mg by mouth every 30 (thirty) days. Give  150 mg by mouth one time a day every 1 month(s)  starting on the 20th for 28 day(s) related to AGERELATED  OSTEOPOROSIS WITHOUT CURRENT  PATHOLOGICAL FRACTURE (M81.0) Start on the  20th and end on the 20th every month. Take on  empty stomach, 30 minutes before breakfast with full  glass of water and must remain sitting up for 30  minutes       levothyroxine (SYNTHROID, LEVOTHROID) 137 MCG tablet Take 137 mcg by mouth daily.        lidocaine 4 % patch Place 1 patch on the skin daily. Remove and discard patch with 12 hours or as directed by MD.       losartan (COZAAR) 50 MG tablet Take 50 mg by mouth 2 (two) times a day.        MAGNESIUM HYDROXIDE, BULK, MISC Use 30 mL As Directed. Milk of Magnesia Suspension 1200  MG/15ML  (Magnesium Hydroxide) Give 30 ml by mouth as  needed for Constipation       melatonin 3 mg Tab tablet Take 3 mg by mouth at bedtime as needed.        multivitamin (MULTIPLE VITAMIN ORAL) Take 1 tablet by mouth daily.       nystatin (MYCOSTATIN) powder Apply topically 3 (three) times a day. Apply to Affected area topically three  times a day for Yeast       omeprazole (PRILOSEC) 20 MG capsule Take 20 mg by mouth 2 (two) times a day before meals.        peg 400-propylene glycol (SYSTANE) 0.4-0.3 % Drop Administer 1 drop to both eyes 4 (four) times a day as needed.       polyethylene glycol (MIRALAX) 17 gram packet Take 17 g by mouth daily.       simethicone (MYLICON) 80 MG chewable tablet Chew 80 mg 4 (four) times a day as needed for flatulence. Simethicone Tablet 80 MG Give 80 mg by mouth as  needed for Abdominal Distention 4 times daily after  meals and HS as needed       spironolactone (ALDACTONE) 25 MG tablet Take 0.5 tablets (12.5 mg total) by mouth daily. (Patient taking differently: Take 12.5 mg by mouth 2 (two) times a day. ) 30 tablet 1     sucralfate (CARAFATE) 100 mg/mL suspension Take 1 g by mouth 2 (two) times a day.       triamcinolone (KENALOG) 0.1 % cream Apply thin layer to feet up to twice daily as needed (Patient taking differently: 2 (two) times a day as needed. Apply thin layer to feet up to twice daily as needed) 30 g 0     No current facility-administered medications for this visit.      Allergies   Allergen Reactions     Penicillins Anaphylaxis, Shortness Of Breath and Rash     Aspirin Nausea Only     Atenolol Cough     Atorvastatin Myalgia     Bupropion Nausea Only     Cleocin [Clindamycin Hcl] Other (See Comments)     Constipation      Codeine Nausea Only     Ibuprofen Nausea Only     Stomach upset     Lovastatin Myalgia     Cephalexin Rash     Neck reddness     Review of Systems:    Constitutional: Negative.  Negative for fever, chills,has  activity change, appetite change and  fatigue.   She experiences significant orthostatic drop in her blood pressure greater than 50 points in sbp  HENT: Negative for congestion and facial swelling.    Eyes: Negative for photophobia, redness and visual disturbance.   Respiratory: Negative for cough and chest tightness.    Cardiovascular: Negative for chest pain, palpitations and HAS leg swelling.   Gastrointestinal: Negative for nausea, diarrhea, she has chronic constipation,no blood in stool and abdominal distention.   Complaining of abdominal bloating  She is now been  complaining of intermittent right lower quadrant abdominal pain with cramping .  Genitourinary: Negative.  Repeatedly requesting that she be toileted for urinary frequency  Musculoskeletal: Negative.    Complaining of low back pain and using an ice pack  Skin: Negative.    Neurological: Negative for dizziness, tremors, syncope, weakness, light-headedness and headaches.   Hematological: Does not bruise/bleed easily.   Psychiatric/Behavioral: Negative.  Remains anxious    Vitals:    07/26/18 1058   BP: 98/67   Pulse: 71   Temp: 98  F (36.7  C)       Physical Exam:    GENERAL: no acute distress. Cooperative in conversation.   HEENT: pupils are equal, round and reactive. Oral mucosa is moist and intact.  RESP:Chest symmetric. Regular respiratory rate. No stridor.  CVS: S1S2; she has an ejection murmur  ABD: Nondistended, soft.  No rebound no guarding but tender to deep palpation in the right lower quadrant  EXTREMITIES: Trace lower extremity edema.  She has limited abduction in her left shoulder due to an old injury.  Right shoulder has good range of movement limited power and strength noted in the bilateral lower extremities  NEURO: non focal. Alert and oriented x3.   PSYCH: within normal limits. No depression ;has anxiety.  SKIN: warm dry intact     Labs:      Results for orders placed or performed during the hospital encounter of 07/04/18   Basic metabolic panel   Result Value Ref Range     Sodium 138 136 - 145 mmol/L    Potassium 4.4 3.5 - 5.0 mmol/L    Chloride 102 98 - 107 mmol/L    CO2 30 22 - 31 mmol/L    Anion Gap, Calculation 6 5 - 18 mmol/L    Glucose 104 70 - 125 mg/dL    Calcium 8.9 8.5 - 10.5 mg/dL    BUN 11 8 - 28 mg/dL    Creatinine 0.59 (L) 0.60 - 1.10 mg/dL    GFR MDRD Af Amer >60 >60 mL/min/1.73m2    GFR MDRD Non Af Amer >60 >60 mL/min/1.73m2       Assessment/Plan:    Bilateral leg weakness s She has a history of repetitive bilateral lower extremity weakness  Associated with that she had severe low back pain.  Neurology suspect she has some neurodegenerative disorder not ruling out Parkinson's welt versus multisystem atrophy.  Repeat imaging included MRI brain and C-spine did not show any new changes she does have multilevel C-spine degenerative changes but no progress since 4/2018  Discharge for PT OT and rehab with outpatient follow-up with neurology.  Ambulating currently using a walker  She has been initiated on Sinemet and recommended to see a movement disorder clinic.  She is seen ambulating with a walker    Acute back pain.  She has a history of acute on chronic pain in her back neck and legs..  Patient has been opioid dependent and it was reviewed that on her imaging she does have multilevel degenerative changes  Pain clinic saw her and recommended weaning her off her narcotics and trying Tylenol diclofenac gel and topical Lidoderm patch.  Also initiated on gabapentin .    Severe orthostatic hypotension which is a long-standing concern.  She has had labile blood pressures in the TCU as well as in the hospital    Seen by cardiology and taken off her fludrocortisone and given a low-dose of spironolactone at 12.5 daily.  It seems she has a significant orthostatic blood pressure drop of greater than 50 systolic blood pressures.  She dropped from 180-90 this morning.  Subsequently she did rebound 110 so it is high in the morning and subsequently very low at nighttime and the rest  of the day.  She is not going to be compliant with Tubigrip's or teds or any other compression hose.  Plan is to continue spironolactone DC her amlodipine and hold losartan for systolic blood pressure less than 160 and monitor her response    Sinus bradycardia with heart rates down to the 50s and 40s  Patient had some workup done and her cortisol level was 10 with no evidence of adrenal insufficiency  Felt to be medication induced most likely from metoprolol which was discontinued .  Advised to follow-up with cardiology as an outpatient.  She remains asymptomatic.    Hypothyroidism on replacement Synthroid    Mood disorder with significant anxiety being reported by patient with repetitive concerns   Discharge on a higher dose of Celexa 30 mg melatonin added for insomnia concerns    Chronic constipation /IBS-continue with his stool regimen  Add senna as to her regimen on a scheduled basis now.    GERD on a PPI    History of insomnia on melatonin .    Abdominal pain primarily in the right lower quadrant unclear etiology -  GI referral given for EGD  At her request I am going to discontinue several of her supplement to see if that will help her with her dyspepsia-like symptoms including  Discontinuing Carafate at is clearly ineffective for her  Reduce Prilosec to 1 time a day to see if that helps it is clearly not working at a twice daily dosing  Hold her calcium citrate as well as vitamin D supplement for now    Cognitive impairment with confusion noted in the hospital.  Patient will take her evening meds as per the hospital and then repeat the medications again and her confusion she would repeat doses of losartan metoprolol Prilosec and spironolactone so it is felt that she is no longer safe to self administer medication and they recommended a home care RN to go home with her  We will await her cognitive testing and medication safety evaluation.    History of AAA status post bifurcated stent graft repair her last CTA  done on 4/2016 was stable  Osteoporosis she gets Boniva but is now on wanting to discontinue the medication. she is also on calcium supplement.    Vitamin D deficiency on supplementation  Debilitation-quite profound she is here for PT OT and rehab requesting a DNR/DNI CODE STATUS  She remains emotionally labile she has been requesting that none of her cardiac meds be adjusted.  She does not want any additional meds added to her regimen so we will respect that.  Walking in PT.  GI referral given to patient monitor blood pressures and changes in meds and response to that.  Total time spent was 35 minutes, more than half of it was in face-to-face counseling regarding disease state, treatment, side effects, documentation, review of clinical data and coordination of care    Electronically signed by: ANNIKA Oconnor  This progress note was completed using Dragon software and there may be grammatical errors.

## 2021-06-20 NOTE — PROGRESS NOTES
Assessment:     1. Hypertension  DISCONTINUED: spironolactone (ALDACTONE) 25 MG tablet   2. Flu vaccine need  Influenza High Dose, Seasonal 65+ yrs   3. Orthostatic hypotension     4. Epigastric pain     5. Gait difficulty     6. Chronic left shoulder pain             Greater than 40 minutes was spent today in interview and examination with patient with more than 50% of that time in counseling and coordination of care of orthostatic hypotension, chronic left shoulder pain and benefit and side effect of steroid, need to take medication if blood pressure is high and follow-up of parkinsonism versus multiple system atrophy with [facility] neurologist.  Patient and daughter verbalized understanding of the plan.       Plan:     Patient Instructions   Current BP medications    1- Spironolactone - 12.5 mg daily    2- Losartan- 50 mg twice a day. Do not take Losartan  if SBP < 140 mg per cardiologist as before.    3-  For Constipation- Do NOT take Imelda lax continuously. Try prune juice and fiber supplement. You Miralax intermittently.    4- Follow up with Movement disorder clinic at [facility]    5. RTC for shoulder injection if desired.        Juany Bentley MD  9/4/2018             The diagnosis was discussed with the patient and evaluation and treatment plans outlined.    Follow up: Return in about 3 weeks (around 9/25/2018) for SHOULDER INJECTION.     Subjective:      Mehreen Camara is a  female who presents for evaluation of   Chief Complaint   Patient presents with     Medication Management     Pt here today for medication check     Flu Vaccine     Req Influenza vaccine     Patient is here for multiple medical issues as follows    1.  Hypertension- takes Losartan in am only if BP > 140 SBP. .   She indicates that she is feeling well and denies any symptoms referable to her elevated blood pressure.   Specifically denies chest pain, palpitations, dyspnea, orthopnea, PND or peripheral edema  Her blood  pressures have been pretty much controlled.  Yesterday she had a good day.      2.  Epigastric pain and heartburn and abdominal discomfort-this has reduced dramatically after discontinuing MiraLAX.  However, now she is concerned about constipation.  Discussed that she may use it intermittently.    3. Shoulder pain: This has been ongoing.  She has had x-rays done which shows moderate DJD.  She has had cortisone injections of the knee in the past but not in the shoulder joint.  Previous injections have coincided with illness and she was reluctant to pursue a shoulder injection.  She had her daughter feels that they  want to pursue now that she is stable    4. Perineal rash: This has resolved    5. Parkinsonism / ? MSA: Reviewed the notes from neurology.  Exam consistent with parkinsonism with benefit noted from Sinemet.    6.  Housing: Will be moving to assisted living and may need orders .    The following portions of the patient's history were reviewed and updated as appropriate: allergies, current medications, past family history, past medical history, past social history, past surgical history and problem list.    Allergies   Allergen Reactions     Penicillins Anaphylaxis, Shortness Of Breath and Rash     Aspirin Nausea Only     Atenolol Cough     Atorvastatin Myalgia     Bupropion Nausea Only     Cleocin [Clindamycin Hcl] Other (See Comments)     Constipation      Codeine Nausea Only     Ibuprofen Nausea Only     Stomach upset     Lovastatin Myalgia     Cephalexin Rash     Neck reddness       Current Outpatient Prescriptions on File Prior to Visit   Medication Sig Dispense Refill     acetaminophen (TYLENOL) 500 MG tablet Take 2 tablets (1,000 mg total) by mouth 3 (three) times a day. (Patient taking differently: Take 1,000 mg by mouth 3 (three) times a day. )  0     betamethasone dipropionate (DIPROLENE) 0.05 % cream Apply twice a day to perineal rash 30 g 0     carbidopa-levodopa (SINEMET)  mg per tablet  Take 2 tablets by mouth 3 (three) times a day.  0     cholecalciferol, vitamin D3, 1,000 unit tablet Take 2,000 Units by mouth daily.        citalopram (CELEXA) 20 MG tablet Take 1.5 tablets (30 mg total) by mouth Daily after lunch.  0     clotrimazole (LOTRIMIN) 1 % cream Applied twice a day to perineal rash 30 g 0     diclofenac sodium (VOLTAREN) 1 % Gel Lower extremities: 4 g to the affected area 4 times daily. Upper extremities: 2 g to the affected area 4 times daily. Do not exceed 32 g total dose per day. (Patient taking differently: 2 g 4 (four) times a day. Diclofenac Sodium Gel 1 % Apply 2 gram  transdermally four times a day for Pain Apply to upper  extremities Do not exceed 32 g total per day)  0     levothyroxine (SYNTHROID, LEVOTHROID) 137 MCG tablet Take 1 tablet (137 mcg total) by mouth daily. 90 tablet 2     lidocaine 4 % patch Place 1 patch on the skin daily. Remove and discard patch with 12 hours or as directed by MD.       losartan (COZAAR) 50 MG tablet Take 50 mg by mouth 2 (two) times a day.       multivitamin (MULTIPLE VITAMIN ORAL) Take 1 tablet by mouth daily.       omeprazole (PRILOSEC) 20 MG capsule Take 20 mg by mouth 2 (two) times a day before meals.        peg 400-propylene glycol (SYSTANE) 0.4-0.3 % Drop Administer 1 drop to both eyes 4 (four) times a day as needed.       ranitidine (ZANTAC) 150 MG tablet Take 150 mg by mouth at bedtime.       melatonin 3 mg Tab tablet Take 3 mg by mouth at bedtime as needed.        sucralfate (CARAFATE) 100 mg/mL suspension Take 1 g by mouth 2 (two) times a day.       triamcinolone (KENALOG) 0.1 % cream Apply thin layer to feet up to twice daily as needed (Patient taking differently: 2 (two) times a day as needed. Apply thin layer to feet up to twice daily as needed) 30 g 0     No current facility-administered medications on file prior to visit.        Patient Active Problem List   Diagnosis     Osteoarthritis     Cataract     Esophageal Reflux      Hypothyroidism     Dyslipidemia     Chronic Major Depression     Hypertension     Aneurysm Of The Abdominal Aorta     Harris's Esophagus     Osteopenia     Rotator Cuff Tendonitis     Retinal Migraine Headache     Menopause Has Occurred     Dizziness     Obesity     Tinnitus Of Both Ears     Adrenal adenoma     S/P AAA repair using bifurcation graft     Weakness     Anxiety     Bloating     Orthostatic hypotension     Urinary incontinence in female     Dizziness     Primary insomnia     Orthostatic hypotension dysautonomic syndrome (H)     Epigastric pain     Constipation     Major depression, chronic     Adjustment disorder with mixed anxiety and depressed mood     Insomnia due to anxiety and fear     Partial small bowel obstruction     Weak     Major depressive disorder, single episode, moderate (H)     Lower extremity weakness     Weakness of both lower extremities     Adjustment disorder with anxious mood     Mood disorder due to a general medical condition     Insomnia due to mental condition     Bradycardia     Lower back pain       Past Medical History:   Diagnosis Date     AAA (abdominal aortic aneurysm) (H)      Acute encephalopathy 9/26/2015     Adrenal adenoma      Harris esophagus      Constipation      Depression      GERD (gastroesophageal reflux disease)      Hiatal hernia      HLD (hyperlipidemia)      HTN (hypertension)      Hypothyroid      Murmur      Osteoarthritis      Retinal Migraine Headache      S/P AAA repair using bifurcation graft 11/30/2015     Scoliosis      Thyroid nodule     removed nodules and thyroid       Past Surgical History:   Procedure Laterality Date     JOINT REPLACEMENT       SC ARTHRODESIS ANT INTERBODY MIN DISCECTOMY, CERVICAL BELOW C2      Description: Cervical Vertebral Fusion;  Recorded: 01/21/2013;  Comments: 1981     SC REVISE MEDIAN N/CARPAL TUNNEL SURG      Description: Neuroplasty Decompression Median Nerve At Carpal Tunnel;  Recorded: 01/21/2013;  Comments:  bilateral     W[place] THYROIDECTOMY      Description: Thyroid Surgery Total Thyroidectomy;  Recorded: 06/08/2011;     [place] TOTAL ABDOM HYSTERECTOMY  1985    Description: Hysterectomy;  Recorded: 01/21/2013;     [place] TOTAL HIP ARTHROPLASTY Right     Description: Total Hip Replacement;  Recorded: 01/21/2013;  Comments: right, 2003       Family History   Problem Relation Age of Onset     Heart disease Mother      Hypertension Mother      Heart disease Father      Cancer Brother 57     colon     Hypertension Daughter      Hypertension Daughter      Neuropathy Daughter      Lupus Daughter      Heart disease Paternal Aunt      Heart disease Paternal Uncle      Parkinsonism Paternal Uncle        Social History     Social History     Marital status:      Spouse name: N/A     Number of children: N/A     Years of education: N/A     Social History Main Topics     Smoking status: Former Smoker     Types: Cigarettes     Quit date: 12/11/1994     Smokeless tobacco: Never Used     Alcohol use No     Drug use: No     Sexual activity: Not Asked     Other Topics Concern     None     Social History Narrative       Review of Systems  A 12 point comprehensive review of systems was negative except as noted.       Objective:   BP (!) 157/98 (Patient Site: Left Arm, Patient Position: Standing, Cuff Size: Adult Regular)  Pulse 64  Temp 98  F (36.7  C) (Oral)   Resp 16  Wt 151 lb 9.6 oz (68.8 kg)  SpO2 97%  BMI 24.1 kg/m2  GENERAL APPEARANCE:  Appearing stated age, alert, cooperative, and in no acute distress..   SKIN: Warm and well perfused.   ABDOMEN: Abdomen soft, eNTND without rebound, BS normal. No masses or organomegaly.   EXTREMITIES: Peripheral pulses are full. Extremities with no edema.  MSK. Shoulder exam reveals no deformity, range of motion is reduced and over the head motion with tenderness in the glenohumeral joint area.   MENTAL STATUS: Alert, oriented and thought content appropriate

## 2021-06-20 NOTE — PROGRESS NOTES
Discussion:   Patient currently receiving  HC services. Met with SW, who reported pt will be evaluated by [facility] for services. Pt moving into [facility] (has EW) on 9/15. Patient has visit with PCP on 9/4 also planning on seeing Neurology.       Plan:   Will reach out to patient & daughter after moving into [facility] to see what services are set up or needed.       Next Outreach:   9/20

## 2021-06-20 NOTE — PROGRESS NOTES
Attempt 1: Care Guide called patient.  If this patient is returning my call, please transfer to Veronica at ext [phone].      Discussion:   Patient currently receiving [facility] HC services. Met with WILLIS, who reported pt will be evaluated by [facility] for services. Moved into [facility] (has EW). BP goal has been completed.       Plan:   Will reach out to patient & daughter after moving into [facility] to see what services are set up or continued services from [facility] are needed.       Next Outreach:   1 week

## 2021-06-20 NOTE — PROGRESS NOTES
Discussion:   [facility] is going to be with pt for at least another week at [facility].     Plan:   Will reach out to patient & daughter after d/c from home care to discuss what continued services from [facility] are needed.     Next Outreach:   10/15/18

## 2021-06-21 NOTE — PROGRESS NOTES
"Cardiology Progress Note    Assessment:  Hypertension,  labile with  pronounced orthostatic drops/autonomic dysfunction/?  Amyloidosis  History of abdominal aortic aneurysm stenting  Aortic stenosis, mild to moderate, stable  GERD      Plan:  Her blood pressure has been lower lately.  She is not taking antihypertensive medications anymore.  I reassured her that aortic stenosis is not a problem.  Follow-up in 6 months    Subjective:   This is 78 y.o. female who comes in today for follow-up visit.  She has been undergoing a evaluation by neurologist at [facility].  She has been taken off antihypertensive medications.  Her systolic blood pressure has been less than 160.  She denies chest pains or increasing shortness of breath.  She  continues to have back pains.  She has symptoms back pains ever since stenting of abdominal aortic aneurysm.    Review of Systems:   General: WNL  Eyes: WNL  Ears/Nose/Throat: WNL  Lungs: WNL  Heart: WNL  Stomach: WNL  Bladder: WNL  Muscle/Joints: WNL  Skin: WNL  Nervous System: WNL  Mental Health: WNL     Blood: WNL    Objective:   /80 (Patient Site: Left Arm, Patient Position: Sitting, Cuff Size: Adult Large)  Pulse (!) 50  Resp 18  Ht 5' 6.5\" (1.689 m)  Wt 146 lb (66.2 kg)  BMI 23.21 kg/m2  Physical Exam:  GENERAL: no distress  NECK: No JVD  LUNGS: Clear to auscultation.  CARDIAC: regular rhythm, S1 & S2 normal.  No heaves, thrills, gallops or murmurs.  ABDOMEN: flat, negative hepatosplenomegaly, soft and non-tender.  EXTREMITIES: No evidence of cyanosis, clubbing or edema.    Current Outpatient Prescriptions   Medication Sig Dispense Refill     acetaminophen (TYLENOL) 500 MG tablet Take 2 tablets (1,000 mg total) by mouth 3 (three) times a day. (Patient taking differently: Take 1,000 mg by mouth 3 (three) times a day. )  0     betamethasone dipropionate (DIPROLENE) 0.05 % cream Apply twice a day to perineal rash 30 g 0     carbidopa-levodopa (SINEMET)  mg " per tablet Take 2 tablets by mouth 3 (three) times a day.  0     cholecalciferol, vitamin D3, 1,000 unit tablet Take 2,000 Units by mouth daily.        citalopram (CELEXA) 20 MG tablet Take 1.5 tablets (30 mg total) by mouth Daily after lunch.  0     clotrimazole (LOTRIMIN) 1 % cream Applied twice a day to perineal rash 30 g 0     diclofenac sodium (VOLTAREN) 1 % Gel Lower extremities: 4 g to the affected area 4 times daily. Upper extremities: 2 g to the affected area 4 times daily. Do not exceed 32 g total dose per day. (Patient taking differently: 2 g 4 (four) times a day. Diclofenac Sodium Gel 1 % Apply 2 gram  transdermally four times a day for Pain Apply to upper  extremities Do not exceed 32 g total per day)  0     gabapentin (NEURONTIN) 100 MG capsule Take 100 mg by mouth 2 (two) times a day. And 2 tablets at bedtime 360 capsule 3     levothyroxine (SYNTHROID, LEVOTHROID) 137 MCG tablet Take 1 tablet (137 mcg total) by mouth daily. 90 tablet 2     melatonin 3 mg Tab tablet Take 3 mg by mouth at bedtime as needed.        multivitamin (MULTIPLE VITAMIN ORAL) Take 1 tablet by mouth daily.       omeprazole (PRILOSEC) 20 MG capsule Take 20 mg by mouth 2 (two) times a day before meals.        peg 400-propylene glycol (SYSTANE) 0.4-0.3 % Drop Administer 1 drop to both eyes 4 (four) times a day as needed.       ranitidine (ZANTAC) 150 MG tablet Take 150 mg by mouth at bedtime.       spironolactone (ALDACTONE) 25 MG tablet Take 0.5 tablets (12.5 mg total) by mouth daily. 30 tablet 6     spironolactone (ALDACTONE) 25 MG tablet Take 0.5 tablets (12.5 mg total) by mouth daily. 30 tablet 6     triamcinolone (KENALOG) 0.1 % cream Apply thin layer to feet up to twice daily as needed (Patient taking differently: 2 (two) times a day as needed. Apply thin layer to feet up to twice daily as needed) 30 g 0     lidocaine 4 % patch Place 1 patch on the skin daily. Remove and discard patch with 12 hours or as directed by MD.        losartan (COZAAR) 50 MG tablet Take 50 mg by mouth 2 (two) times a day.       No current facility-administered medications for this visit.        Cardiographics:    Holter: June 2017  Normal    Echo: September 2018    When compared to the previous study dated 5/6/2017, no significant change.    Left ventricle ejection fraction is normal. The calculated left ventricular ejection fraction is 65%.    Mild concentric left ventricular hypertrophy    Normal right ventricular size and systolic function.    Mild aortic stenosis with trace aortic insufficiency        Stress Test: November 2015  1. Lexiscan stress nuclear study is negative for inducible myocardial   ischemia or infarction.     Lab Results:       Lab Results   Component Value Date    CHOL 256 (H) 09/22/2015    CHOL 241 (H) 03/10/2015    CHOL 252 (H) 11/10/2014     Lab Results   Component Value Date    HDL 43 09/22/2015    HDL 43 03/10/2015    HDL 35 (L) 11/10/2014     Lab Results   Component Value Date    LDLCALC 195 (H) 09/22/2015    LDLCALC 168 (H) 03/10/2015    LDLCALC 174 (H) 11/10/2014     Lab Results   Component Value Date    TRIG 91 09/22/2015    TRIG 149 03/10/2015    TRIG 216 (H) 11/10/2014     BNP   Date Value Ref Range Status   10/25/2015 69 0 - 137 pg/mL Final       Parag (Hector)  MD Adriane

## 2021-06-21 NOTE — LETTER
"Letter by Ne Quintana MBBS at      Author: Ne Quintana MBBS Service: -- Author Type: --    Filed:  Encounter Date: 12/14/2020 Status: (Other)         Patient: Mehreen Camara   MR Number: [id number]   YOB: 1940   Date of Visit: 12/14/2020     [facility]   Video Visit    Code Status: DNR/DNI    Mehreen Camara is a 80 y.o. female who is being evaluated via a billable video visit.      The patient has been notified of following:     \"This video visit will be conducted via a call between you and your physician/provider. We have found that certain health care needs can be provided without the need for an in-person physical exam.  This service lets us provide the care you need with a video conversation.  If a prescription is necessary we can send it to the facility team.  If lab work is needed we can place an order through the facility team to have that test done at a later time.    If during the course of the call the physician/provider feels a video visit is not appropriate, you will not be charged for this service.\"     Physician/Provider has received verbal consent for a Video Visit from the patient? Yes    Patient would like the video invitation sent by: Send to: [facility]    Video Start Time: 11.45PM    Chief Complaint/Reason for Visit:  Chief Complaint   Patient presents with   ? H & P       HPI:   Mehreen is a 80 y.o. female who is being examined in the Covid isolation unit because of testing positive for Covid infection.  She has absolutely no symptoms and denies any cough congestion shortness of breath  She has been afebrile since moving to the Covid unit 5 days ago  No hypoxia with respiratory failure reported either  Her major issue remains pain with her underlying history of chronic pain  She remains on topical pain gels as well as oxycodone and has been asking them quite often  She has an underlying history of anxiety disorder and reports her mood has been " stable.  At baseline she has a movement disorder and significant gait instability and remains a falls risk.    Active Ambulatory Problems     Diagnosis Date Noted   ? Osteoarthritis 11/22/2006   ? Cataract 09/17/2018   ? Hypothyroidism 09/22/2009   ? Mixed hyperlipidemia 02/10/2008   ? Chronic Major Depression 09/17/2018   ? Hypertension 11/22/2006   ? Aneurysm Of The Abdominal Aorta 02/25/2008   ? Harris's esophagus 01/11/2012   ? Osteopenia 09/17/2018   ? Diaphragmatic hernia 09/18/2018   ? Retinal Migraine Headache    ? Menopause Has Occurred    ? Dizziness    ? Obesity    ? Tinnitus Of Both Ears    ? Adrenal adenoma 06/04/2008   ? S/P AAA repair using bifurcation graft 11/30/2015   ? Weakness 05/04/2017   ? Anxiety state 02/26/2008   ? Bloating 05/05/2017   ? Orthostatic hypotension    ? Early satiety 12/16/2013   ? Dizziness 07/03/2017   ? Primary insomnia 07/04/2017   ? Orthostatic hypotension dysautonomic syndrome (H) 09/22/2017   ? Abdominal pain, epigastric 12/16/2013   ? Constipation 12/16/2013   ? Depression, major 07/13/2009   ? Adjustment disorder with mixed anxiety and depressed mood 09/17/2018   ? Insomnia due to anxiety and fear 09/17/2018   ? Partial small bowel obstruction (H)    ? Weak 04/29/2018   ? Major depressive disorder, single episode, moderate (H)    ? Generalized muscle weakness 07/04/2018   ? Weakness of both lower extremities 07/05/2018   ? Adjustment disorder with anxious mood 09/17/2018   ? Mood disorder due to a general medical condition    ? Insomnia due to mental condition    ? Bradycardia 09/17/2018   ? Lower back pain    ? Back pain 05/16/2020   ? Lives in assisted living facility 05/16/2020   ? DDD (degenerative disc disease), lumbosacral 02/26/2008   ? Metabolic encephalopathy    ? Cognitive and behavioral changes    ? Agitation    ? Sleep difficulties    ? Retinal migraine 04/28/2014   ? Nonrheumatic aortic valve stenosis 10/23/2020   ? Abnormal computed tomography scan  09/15/2016   ? Abnormal finding on imaging 09/15/2016   ? Anemia 11/18/2010   ? Benign neoplasm of ascending colon 09/19/2016   ? Bilateral knee pain 08/05/2020   ? Chest wall pain 01/20/2013   ? Compression fracture of lumbar vertebra (H) 10/10/2020   ? Contact dermatitis and eczema 11/22/2006   ? Dietary counseling and surveillance 09/15/2016   ? Diverticulosis of colon 12/18/2014   ? Dysphagia 12/17/2013   ? Dyslipidemia 09/17/2018   ? Fall 08/05/2020   ? Flatulence, eructation and gas pain 02/19/2014   ? Gaseous abdominal distention 09/19/2016   ? Gastro-esophageal reflux disease with esophagitis 06/06/2017   ? Hemorrhage of rectum and anus 09/19/2016   ? Hoarseness of voice 04/06/2010   ? Hypokalemia 06/29/2007   ? Irritable bowel syndrome 11/22/2006   ? Malaise and fatigue 07/10/2007   ? Movement disorder 09/19/2018   ? 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 Nontoxic multinodular goiter 11/22/2006   ? Polyp of colon 09/19/2016   ? Post-op pain 12/12/2018   ? Postoperative hypothyroidism 01/15/2013   ? Prediabetes 10/26/2010   ? REM sleep behavior disorder 08/05/2020   ? Rupture of left Achilles tendon, sequela 12/31/2019   ? S/P laparoscopic cholecystectomy 12/12/2018   ? Undiagnosed cardiac murmurs 11/22/2006   ? Urinary retention 11/02/2010   ? Vitamin D deficiency 01/15/2013   ? Chronic low back pain 09/29/2016     Resolved Ambulatory Problems     Diagnosis Date Noted   ? Esophageal reflux    ? Hypertensive urgency 09/25/2015   ? Uncontrolled hypertension 09/25/2015   ? Acute encephalopathy 09/26/2015   ? Frequent loose stools 05/05/2017     Past Medical History:   Diagnosis Date   ? AAA (abdominal aortic aneurysm) (H)    ? Harris esophagus    ? Depression    ? GERD (gastroesophageal reflux disease)    ? Hiatal hernia    ? HLD (hyperlipidemia)    ? HTN (hypertension)    ? Hypothyroid    ? Murmur    ? Scoliosis    ? Thyroid nodule      Social history includes currently residing in assisted living  facility no current history of smoking or alcohol use    Family history includes a history of cancer in her brother heart disease in her father and mother as well as paternal uncle and aunt  Daughter has hypertension as well as lupus    ALLERGIES  Penicillins, Aspirin, Atenolol, Atorvastatin, Bupropion, Cleocin [clindamycin hcl], Clindamycin, Codeine, Ibuprofen, Lovastatin, and Cephalexin    Medication List:  Current Outpatient Medications   Medication Sig   ? acetaminophen (TYLENOL) 500 MG tablet Take 1,000 mg by mouth 4 (four) times a day.    ? bisacodyl (DULCOLAX) 10 mg suppository Insert 10 mg into the rectum daily as needed.   ? calcium, as carbonate, (TUMS) 200 mg calcium (500 mg) chewable tablet Chew 1 tablet every 2 (two) hours as needed for heartburn.    ? carbidopa-levodopa (SINEMET CR)  mg ER tablet Take 1 tablet by mouth 3 (three) times a day.   ? cholecalciferol, vitamin D3, 1,000 unit tablet Take 2,000 Units by mouth daily.    ? clotrimazole (LOTRIMIN) 1 % cream Apply 1 application topically 2 (two) times a day as needed. perineal rash   ? diclofenac sodium (VOLTAREN) 1 % Gel Apply 4 g topically 2 (two) times a day.   ? DULoxetine (CYMBALTA) 60 MG capsule Take 60 mg by mouth daily.   ? gabapentin (NEURONTIN) 300 MG capsule Take 300 mg by mouth 2 (two) times a day. AND give 600 mg by mouth at bedtime   ? hydroCHLOROthiazide (HYDRODIURIL) 25 MG tablet Take 25 mg by mouth daily.   ? hydrOXYzine pamoate (VISTARIL) 25 MG capsule Take 25 mg by mouth 4 (four) times a day.   ? levothyroxine (SYNTHROID, LEVOTHROID) 100 MCG tablet Take 100 mcg by mouth daily.   ? levothyroxine (SYNTHROID, LEVOTHROID) 137 MCG tablet Take 1 tablet (137 mcg total) by mouth daily.   ? losartan (COZAAR) 50 MG tablet TAKE TABLET BY MOUTH EVERY MORNING   ? oxyCODONE (ROXICODONE) 5 MG immediate release tablet Take 5 mg by mouth every 4 (four) hours as needed for pain.   ? peg 400-propylene glycol (SYSTANE) 0.4-0.3 % Drop  Administer 1 drop to both eyes 3 (three) times a day. AND instill 2 drops into both eyes every day PRN   ? polyethylene glycol (MIRALAX) 17 gram packet Take 17 g by mouth daily. AND PRN (max 3 doses/24 hours)   ? psyllium with dextrose (METAMUCIL JAR) powder Take 1 g by mouth daily.   ? QUEtiapine (SEROQUEL) 25 MG tablet Take 25 mg by mouth at bedtime. AND give 25 mg by mouth daily PRN   ? senna (SENOKOT) 8.6 mg tablet Take 1 tablet by mouth 2 (two) times a day. AND give 1 tablet by mouth PRN       Constitutional: Negative.  Negative for fever, chills, activity change, appetite change and fatigue.   HENT: Negative for congestion and facial swelling.    Eyes: Negative for photophobia, redness and visual disturbance.   Respiratory: Negative for cough and chest tightness.    Cardiovascular: Negative for chest pain, palpitations and leg swelling.   Gastrointestinal: Negative for nausea, diarrhea, constipation, blood in stool and abdominal distention.   Genitourinary: Negative.    Musculoskeletal: Negative.  HAS H/O FALLS AND GAIT AND BALANCE ISSUES  ALSO HAS CHRONIC PAIN  Skin: Negative.    Neurological: Negative for dizziness, tremors, syncope, weakness, light-headedness and headaches.   Hematological: Does not bruise/bleed easily.   Psychiatric/Behavioral: Negative.      PHYSICAL EXAM  T 98 /78 P78  GENERAL: no acute distress. Cooperative in conversation.   HEENT: pupils are equal, round and reactive. Oral mucosa is moist and intact.  RESP:Chest symmetric. Regular respiratory rate. No stridor.  ABD: Nondistended, soft.  EXTREMITIES: No lower extremity edema.   NEURO: non focal. Alert and oriented x3.   PSYCH: within normal limits. No depression or anxiety.  SKIN: warm dry intact       Labs:  Recent Results (from the past 240 hour(s))   COVID-19 Virus PCR MRF    Specimen: Respiratory   Result Value Ref Range    COVID-19 VIRUS SPECIMEN SOURCE [facility]     2019-nCOV Detected, Abnormal Result (!!)           Assessment/Plan:  Acute Covid infection  Underlying history of Lewy body dementia with behavioral disturbances  Multiple system atrophy  Depression  History of chronic pain  History of falls    Patient has been moved from her assisted living facility and has been moved to the Covid isolation unit after she tested positive.  Today she is afebrile.  She is not hypoxic.  She denies any localizing symptoms for Covid infection with no cough congestion or any other localizing symptoms reported.  She will be monitored and is made aware of the fact.  In the meantime she will be treated symptomatically only and no labs are needed as she is completely asymptomatic.  Oral intake is being monitored and weights have been stable intake has been stable so far to as per staff.  She has underlying history of Lewy body dementia as well as multiple system atrophy and is a high fall risk.  She will be monitored closely.    She also has anxiety disorder and remains on psychotropic meds which will be continued  She remains anxious about her chronic pain issues and narcotics and I did update her that we will not be adjusting those meds as she is short-term stay here and as soon as she is done with her isolation she will be discharging back to the halfway and can discuss them with her primary physician there  We will not be tapering her off any narcs.  Her goal is to go back to her halfway.  If she worsens she may go to the hospital    Video-Visit Details    Type of service:  Video Visit    Video End Time (time video stopped): 12.20PM    Originating Location (pt. Location):[facility] [[id number]]    Distant Location (provider location):  [facility]     Mode of Communication:  Zoom Video Conference        ANNIKA Oconnor

## 2021-06-21 NOTE — PROGRESS NOTES
Discussion:   Pt is working with ELENO PEDRO on [facility] resources. Pt is living in [facility]. Pt is working with [facility].     Plan:   Patient will be unenrolled from [facility] due to transfer of care to [facility]. Pt is able to work with their Care Coordinators if needed.

## 2021-06-23 ENCOUNTER — ANCILLARY PROCEDURE (OUTPATIENT)
Dept: GENERAL RADIOLOGY | Facility: CLINIC | Age: 81
End: 2021-06-23
Attending: FAMILY MEDICINE
Payer: COMMERCIAL

## 2021-06-23 ENCOUNTER — OFFICE VISIT (OUTPATIENT)
Dept: ORTHOPEDICS | Facility: CLINIC | Age: 81
End: 2021-06-23
Payer: COMMERCIAL

## 2021-06-23 DIAGNOSIS — S32.591D CLOSED BILATERAL FRACTURE OF PUBIC RAMI WITH ROUTINE HEALING, SUBSEQUENT ENCOUNTER: Primary | ICD-10-CM

## 2021-06-23 DIAGNOSIS — S32.592D CLOSED BILATERAL FRACTURE OF PUBIC RAMI WITH ROUTINE HEALING, SUBSEQUENT ENCOUNTER: ICD-10-CM

## 2021-06-23 DIAGNOSIS — S32.591D CLOSED BILATERAL FRACTURE OF PUBIC RAMI WITH ROUTINE HEALING, SUBSEQUENT ENCOUNTER: ICD-10-CM

## 2021-06-23 DIAGNOSIS — S32.592D CLOSED BILATERAL FRACTURE OF PUBIC RAMI WITH ROUTINE HEALING, SUBSEQUENT ENCOUNTER: Primary | ICD-10-CM

## 2021-06-23 PROCEDURE — 99213 OFFICE O/P EST LOW 20 MIN: CPT | Performed by: FAMILY MEDICINE

## 2021-06-23 PROCEDURE — 72190 X-RAY EXAM OF PELVIS: CPT | Performed by: RADIOLOGY

## 2021-06-23 RX ORDER — BISACODYL 10 MG
10 SUPPOSITORY, RECTAL RECTAL DAILY PRN
COMMUNITY

## 2021-06-23 NOTE — TELEPHONE ENCOUNTER
Patient's daughter called to further discuss BP concerns. Pt has been having issues with BP at her senior living facility and daughter requests that patient be seen by Dr. Forrest to assess and give recommendations. Follow-up previously scheduled for early May however, daughter hopes to have pt be seen sooner. Attempted to transfer call to  but call was disconnected. Called back Irasema and informed her that a message would be sent to our schedulers to call her to arrange appointment.     Advised Irasema to bring updated medication list to follow-up appointment as well as any BP recordings available for [facility] to review.

## 2021-06-23 NOTE — TELEPHONE ENCOUNTER
----- Message from Reg Alvarez sent at 2/6/2019 11:07 AM CST -----  Contact: Dalton Madrid  General phone call:    Caller: Mercy   Primary cardiologist: Adriane   Detailed reason for call: Patients daughter called with concerns that patients care center recently started patient back on BP medication Amlodophine without consulting with the patients family and/or Cardiologist first. Patients daughter would like to discuss this with Nurse.  New or active symptoms? Possible increase in BP per care center  Best phone number: Dalton Madrid work: [phone]  Or cell: [phone]  Best time to contact: anytime   Ok to leave a detailed message? Yes  Device? No    Additional Info:

## 2021-06-23 NOTE — PROGRESS NOTES
Subjective:   Mehreen Camara is a 81 year old female who follows up regarding her pubic rami fracture. She has had increased pain in her pelvis and back.   Painful into her back, right sided low back pain and across low back  Difficulty with movement, unable to move as much  Unable to be as active  Fell in the hallway where she lives, pain isn't stopping  Working with MD with back prior to pelvic fractures  Grandson helped her up  Doing PT at facility, riding stationary bike, strengthening therapy, balance  [place]- seen for low back pain, 3rd week in Dec  OA- left shoulder, in a lot of pain  Using lift chair  Hard time getting up to walker like before her fall.  Patient seen with her daughter present today       date of injury: 12/23/21  Date last seen: 3/17/2021    PAST MEDICAL, SOCIAL, SURGICAL AND FAMILY HISTORY: She  has a past medical history of Adrenal adenoma, Arthritis, Depressive disorder, Hypertension, Multiple system atrophy P (H), Rheumatic fever, Small bowel obstruction (H), and Thyroid disease.  She  has a past surgical history that includes orthopedic surgery; Thyroidectomy (2011); joint replacement (Right, 2003); Hysterectomy (2013); carpal tunnel release rt/lt (Bilateral, 2013); Spine surgery (1981); aaa repair (2015); Laparoscopic cholecystectomy (N/A, 12/12/2018); and IR Abdominal Endovascular Stent Graft (11/30/2015).  Her family history includes Coronary Artery Disease in her father and mother; Hypertension in her mother; Other Cancer in her brother; Parkinsonism in an other family member.  She reports that she quit smoking about 26 years ago. Her smoking use included cigarettes. She smoked 0.50 packs per day. She has never used smokeless tobacco. She reports that she does not drink alcohol or use drugs.    ALLERGIES: She is allergic to penicillins; aspirin; atenolol; atorvastatin; bupropion; cephalexin; clindamycin; codeine; ibuprofen; and lovastatin.    CURRENT MEDICATIONS: She has a  current medication list which includes the following prescription(s): acetaminophen, carbidopa-levodopa, diclofenac, duloxetine, gabapentin, hydralazine, hydrochlorothiazide, levothyroxine, losartan, menthol, oxycodone, polyethylene glycol, polyethylene glycol-propylene glycol, quetiapine, quetiapine, senna-docusate, simethicone, and vitamin d3.     REVIEW OF SYSTEMS: 10 point review of systems is negative except as noted above.     Exam:   There were no vitals taken for this visit.           CONSTITUTIONAL: alert, mild distress, cooperative and obese  HEAD: Normocephalic. No masses, lesions, tenderness or abnormalities  SKIN: no suspicious lesions or rashes  GAIT: antalgic and wheelchair  NEUROLOGIC: Non-focal  PSYCHIATRIC: affect normal/bright and mentation appears normal.    MUSCULOSKELETAL: pelvis  Patient with discomfort in the right side of the low back patient does not describe pain with sitting directly over pubic rami fractures       Assessment/Plan:   Patient is an 81-year-old female with past medical history of Lewy body dementia, chronic low back pain presenting to follow-up on pelvic fractures    1.  Pelvic fractures-interval healing has taken place and callus formation is present  The right sided pubic rami fractures were more significant than the left  Patient continues to report pain with sitting and I believe this is likely related to her lumbar condition  Patient sees [facility] for her lumbar concerns    RTC prn  X-RAY INTERPRETATION:   Pelvic x-rays- interval healing of bilateral superior and inferior pubic rami fractures

## 2021-06-23 NOTE — LETTER
6/23/2021      RE: Mehreen Camara  [address]                                                                                                  [address]  [facility] [place] [facility] [address]        Subjective:   Mehreen Camara is a 81 year old female who follows up regarding her pubic rami fracture. She has had increased pain in her pelvis and back.   Painful into her back, right sided low back pain and across low back  Difficulty with movement, unable to move as much  Unable to be as active  Fell in the hallway where she lives, pain isn't stopping  Working with MD with back prior to pelvic fractures  Grandson helped her up  Doing PT at facility, riding stationary bike, strengthening therapy, balance  [place]- seen for low back pain, 3rd week in Dec  OA- left shoulder, in a lot of pain  Using lift chair  Hard time getting up to walker like before her fall.  Patient seen with her daughter present today       date of injury: 12/23/21  Date last seen: 3/17/2021    PAST MEDICAL, SOCIAL, SURGICAL AND FAMILY HISTORY: She  has a past medical history of Adrenal adenoma, Arthritis, Depressive disorder, Hypertension, Multiple system atrophy P (H), Rheumatic fever, Small bowel obstruction (H), and Thyroid disease.  She  has a past surgical history that includes orthopedic surgery; Thyroidectomy (2011); joint replacement (Right, 2003); Hysterectomy (2013); carpal tunnel release rt/lt (Bilateral, 2013); Spine surgery (1981); aaa repair (2015); Laparoscopic cholecystectomy (N/A, 12/12/2018); and IR Abdominal Endovascular Stent Graft (11/30/2015).  Her family history includes Coronary Artery Disease in her father and mother; Hypertension in her mother; Other Cancer in her brother; Parkinsonism in an other family member.  She reports that she quit smoking about 26 years ago. Her smoking use included cigarettes. She smoked 0.50 packs per day. She has never used smokeless tobacco. She reports that she does not drink alcohol or use  drugs.    ALLERGIES: She is allergic to penicillins; aspirin; atenolol; atorvastatin; bupropion; cephalexin; clindamycin; codeine; ibuprofen; and lovastatin.    CURRENT MEDICATIONS: She has a current medication list which includes the following prescription(s): acetaminophen, carbidopa-levodopa, diclofenac, duloxetine, gabapentin, hydralazine, hydrochlorothiazide, levothyroxine, losartan, menthol, oxycodone, polyethylene glycol, polyethylene glycol-propylene glycol, quetiapine, quetiapine, senna-docusate, simethicone, and vitamin d3.     REVIEW OF SYSTEMS: 10 point review of systems is negative except as noted above.     Exam:   There were no vitals taken for this visit.           CONSTITUTIONAL: alert, mild distress, cooperative and obese  HEAD: Normocephalic. No masses, lesions, tenderness or abnormalities  SKIN: no suspicious lesions or rashes  GAIT: antalgic and wheelchair  NEUROLOGIC: Non-focal  PSYCHIATRIC: affect normal/bright and mentation appears normal.    MUSCULOSKELETAL: pelvis  Patient with discomfort in the right side of the low back patient does not describe pain with sitting directly over pubic rami fractures       Assessment/Plan:   Patient is an 81-year-old female with past medical history of Lewy body dementia, chronic low back pain presenting to follow-up on pelvic fractures    1.  Pelvic fractures-interval healing has taken place and callus formation is present  The right sided pubic rami fractures were more significant than the left  Patient continues to report pain with sitting and I believe this is likely related to her lumbar condition  Patient sees [facility] for her lumbar concerns    RTC prn  X-RAY INTERPRETATION:   Pelvic x-rays- interval healing of bilateral superior and inferior pubic rami fractures        Sara Rosario MD

## 2021-06-23 NOTE — TELEPHONE ENCOUNTER
Called patient's daughter, Mercy who shares concerns about patient's blood pressure. Pt is currently living at [facility] in [place] and is being seen by a NP - Cathy Sargent. Mercy states that the NP restarted her on Amlodipine on 1/22 and is concerned because Dr. Forrest had previously weaned her off this medication due to side effects. Mercy does not have records of pts BP or current dosage of medication. She has sent message to CATHIE Morgan to have her contact Dr. Forrest's office for suggestions regarding BP control. Shared my direct line with Mercy to have NP contact us to discuss BP further. Will await call from facility.

## 2021-06-24 NOTE — TELEPHONE ENCOUNTER
Called and LM to nurses at [facility] with Dr. Forrest's recommendations. Will fax over order as well. Advised nurse to call back with any further questions/concerns.

## 2021-06-24 NOTE — TELEPHONE ENCOUNTER
----- Message from Shonda Gan RN sent at 2/27/2019 10:55 AM CST -----  Contact: Patient   Hey there!    Looks like Dr. Martinez hasn't seen this patient in over 2 years.  Dr. Forrest saw yesterday.     Can you please call patient?    Thanks!   Shonda   ----- Message -----  From: Reg Alvarez  Sent: 2/27/2019   9:38 AM  To: [facility] Support Pool    General phone call:    Caller: Patient   Primary cardiologist: Michelle   Detailed reason for call: Patient would like ASAP call back due to trouble breathing, shortness of breath symptoms  New or active symptoms? shortness of breath, trouble breathing   Best phone number:  [phone]   Best time to contact: as soon as available per patient  Device? NO    Additional Info:

## 2021-06-24 NOTE — PATIENT INSTRUCTIONS - HE
Mehreen Camara,    It was a pleasure to see you today at the [facility].     My recommendations after this visit include:    Stop amlodipine  Start losartan 50 mg  rylee Forrest MD, FACC, [facility]          
Patient requests all Lab and Radiology Results on their Discharge Instructions

## 2021-06-24 NOTE — PROGRESS NOTES
"Cardiology Progress Note    Assessment:  Hypertension,  labile with  pronounced orthostatic drops/autonomic dysfunction  Multiple system atrophy  History of abdominal aortic aneurysm status post endovascular repair  Aortic stenosis, mild to moderate, stable  GERD    Plan:  Standard guidelines for treatment of hypertension do not apply to this patient who has autonomic dysfunction/symptomatic orthostatic hypotension.  We need to allow higher systolic blood pressure in order to prevent orthostatic symptoms including falls.  I would favor using losartan 50 mg once a day in place of amlodipine.  Amlodipine as a direct vasodilator is more likely to aggravate orthostatic hypotension.    Follow-up in 2 months    Subjective:   This is 79 y.o. female who comes in today for follow-up visit.  She was recently diagnosed with multiple system atrophy.  He follows at [facility] neurology.  Blood pressure has been elevated at her assisted living facility.  She was started on amlodipine.  She feels lightheaded.  She also complains of  feet discomfort  She checks blood pressure at home.  Systolic blood pressure dropped by about 50 mmHg with assumption of upright position.    Review of Systems:   General: WNL  Eyes: Visual Distubance  Ears/Nose/Throat: WNL  Lungs: WNL  Heart: WNL  Stomach: Constipation, Heartburn, Nausea  Bladder: Frequent Urination at Night  Muscle/Joints: Joint Pain  Skin: WNL  Nervous System: WNL  Mental Health: Anxiety     Blood: WNL    Objective:   /72 (Patient Site: Left Arm, Patient Position: Sitting, Cuff Size: Adult Large)   Pulse 72   Resp 18   Ht 5' 6.5\" (1.689 m)   Wt 148 lb (67.1 kg)   BMI 23.53 kg/m    Physical Exam:  GENERAL: no distress  NECK: No JVD  LUNGS: Clear to auscultation.  CARDIAC: regular rhythm, S1 & S2 normal.  No heaves, thrills, gallops soft ejection murmur at the aortic area  ABDOMEN: flat, negative hepatosplenomegaly, soft and non-tender.  EXTREMITIES: No " evidence of cyanosis, clubbing or edema.    Current Outpatient Medications   Medication Sig Dispense Refill     acetaminophen (TYLENOL) 500 MG tablet Take 2 tablets (1,000 mg total) by mouth 3 (three) times a day. (Patient taking differently: Take 1,000 mg by mouth 3 (three) times a day. )  0     betamethasone dipropionate (DIPROLENE) 0.05 % cream Apply twice a day to perineal rash 30 g 0     bisacodyl (DULCOLAX) 10 mg suppository Insert 10 mg into the rectum daily as needed.       carbidopa-levodopa (SINEMET)  mg per tablet Take 2 tablets by mouth 3 (three) times a day.  0     cholecalciferol, vitamin D3, 1,000 unit tablet Take 2,000 Units by mouth daily.        citalopram (CELEXA) 20 MG tablet Take 1.5 tablets (30 mg total) by mouth Daily after lunch.  0     clotrimazole (LOTRIMIN) 1 % cream Applied twice a day to perineal rash 30 g 0     diclofenac sodium (VOLTAREN) 1 % Gel Lower extremities: 4 g to the affected area 4 times daily. Upper extremities: 2 g to the affected area 4 times daily. Do not exceed 32 g total dose per day. (Patient taking differently: 2 g 4 (four) times a day. Diclofenac Sodium Gel 1 % Apply 2 gram  transdermally four times a day for Pain Apply to upper  extremities Do not exceed 32 g total per day)  0     gabapentin (NEURONTIN) 100 MG capsule Take 100 mg by mouth 2 (two) times a day. And 2 tablets at bedtime 360 capsule 3     HYDROcodone-acetaminophen 5-325 mg per tablet TAKE 1 TABLET BY MOUTH EVERY DAY (MAX APAP 4GM/24HRS);TAKE 1 TABLET BY MOUTH TWICE DAILY AS NEEDED FOR PAIN (MAX APAP 4GM/24HRS)       levothyroxine (SYNTHROID, LEVOTHROID) 137 MCG tablet Take 1 tablet (137 mcg total) by mouth daily. 90 tablet 2     lidocaine 4 % patch Place 1 patch on the skin daily. Remove and discard patch with 12 hours or as directed by MD.       melatonin 3 mg Tab tablet Take 3 mg by mouth at bedtime as needed.        multivitamin (MULTIPLE VITAMIN ORAL) Take 1 tablet by mouth daily.        omeprazole (PRILOSEC) 20 MG capsule Take 20 mg by mouth 2 (two) times a day before meals.        peg 400-propylene glycol (SYSTANE) 0.4-0.3 % Drop Administer 1 drop to both eyes 4 (four) times a day as needed.       ranitidine (ZANTAC) 150 MG tablet Take 150 mg by mouth at bedtime.       senna (SENOKOT) 8.6 mg tablet Take 1 tablet by mouth.       spironolactone (ALDACTONE) 25 MG tablet Take 0.5 tablets (12.5 mg total) by mouth daily. 30 tablet 6     triamcinolone (KENALOG) 0.1 % cream Apply thin layer to feet up to twice daily as needed (Patient taking differently: 2 (two) times a day as needed. Apply thin layer to feet up to twice daily as needed) 30 g 0     zinc oxide (DESITIN) 13 % Crea Apply topically.       losartan (COZAAR) 50 MG tablet Take 1 tablet (50 mg total) by mouth daily. 30 tablet 12     spironolactone (ALDACTONE) 25 MG tablet Take 0.5 tablets (12.5 mg total) by mouth daily. 30 tablet 6     No current facility-administered medications for this visit.        Cardiographics:    Holter: June 2017  Normal     Echo: September 2018    When compared to the previous study dated 5/6/2017, no significant change.    Left ventricle ejection fraction is normal. The calculated left ventricular ejection fraction is 65%.    Mild concentric left ventricular hypertrophy    Normal right ventricular size and systolic function.    Mild aortic stenosis with trace aortic insufficiency         Stress Test: November 2015  1. Lexiscan stress nuclear study is negative for inducible myocardial   ischemia or infarction.         Lab Results:       Lab Results   Component Value Date    CHOL 256 (H) 09/22/2015    CHOL 241 (H) 03/10/2015    CHOL 252 (H) 11/10/2014     Lab Results   Component Value Date    HDL 43 09/22/2015    HDL 43 03/10/2015    HDL 35 (L) 11/10/2014     Lab Results   Component Value Date    LDLCALC 195 (H) 09/22/2015    LDLCALC 168 (H) 03/10/2015    LDLCALC 174 (H) 11/10/2014     Lab Results   Component Value Date     TRIG 91 09/22/2015    TRIG 149 03/10/2015    TRIG 216 (H) 11/10/2014     BNP   Date Value Ref Range Status   10/25/2015 69 0 - 137 pg/mL Final       Parag (Hector)  MD Adriane

## 2021-06-24 NOTE — TELEPHONE ENCOUNTER
Spoke with pt and Assist Care RN. RN will check with pt pharm ( not [facility]) to confirm meds and Losartan before changing to Valsartan. [facility] was the pharm asking for med change.

## 2021-06-24 NOTE — TELEPHONE ENCOUNTER
----- Message from Parag Forrest MD (Ted) sent at 3/11/2019 10:12 AM CDT -----  Regarding: RE: losartan refill  Valsartan 80 mg a day  ----- Message -----  From: Peri Salter RN  Sent: 3/11/2019   8:54 AM  To: Parag Forrest MD (Ted)  Subject: losartan refill                                  There is a recall on Losartan. Pharm asking for different med??

## 2021-06-24 NOTE — TELEPHONE ENCOUNTER
Received call from Jaime ROSALES at patient's [facility]. They received the signed paperwork from [facility] regarding discontinuing amlodipine and starting losartan 50 mg once a day due to orthostatic hypotension changes with symptoms and falls. Pt had a PRN order for Losartan 50 mg two times a day for systolic greater than 140. They want to know if that is still applying. Per records, it does look like [facility] discontinued this as needed order. Explained to nurse that Dr. Forrest is ok with a higher BP:  Plan:  Standard guidelines for treatment of hypertension do not apply to this patient who has autonomic dysfunction/symptomatic orthostatic hypotension.  We need to allow higher systolic blood pressure in order to prevent orthostatic symptoms including falls.  I would favor using losartan 50 mg once a day in place of amlodipine.  Amlodipine as a direct vasodilator is more likely to aggravate orthostatic hypotension.     Follow-up in 2 months       Will confirm with [facility] per nurse request.      Dr. Forrest,  Pt to stop amlodipine and start losartan 50 mg once a day. [facility] wants to confirm that PRN dose of Losartan is discontinued.  Thanks,  Zane Young,  Can you call [facility] in the AM with [facility] response? Charity's number is [phone]. Just to confirm that the losartan PRN order is discontinued and the patient will simply take daily 50 mg.  Thanks,  Zane

## 2021-06-26 NOTE — PROGRESS NOTES
Progress Notes by Jaci Soriano CNP at 2018 11:18 AM     Author: Jaci Soriano CNP Service: -- Author Type: Nurse Practitioner    Filed: 2018 10:33 AM Encounter Date: 2018 Status: Attested Addendum    : Jaci Soriano CNP (Nurse Practitioner)    Related Notes: Original Note by Jaci Soriano CNP (Nurse Practitioner) filed at 2018 12:32 PM    Cosigner: Ne Quintana MBBS at 2018  1:51 PM    Attestation signed by Ne Quintana MBBS at 2018  1:51 PM    Agree with discharge plan                [facility]      NAME:  Mehreen Camara             :  1940  MRN: [id number]  CODE STATUS:  DNR    VISIT TYPE: DISCHARGE SUMMARY  FACILYTY: [facility] [[id number]]                    PRIMARY CARE PROVIDER: Juany Bentley MD    DISCHARGE DIAGNOSIS:      1. Weakness of both lower extremities    2. Major depressive disorder, single episode, moderate (H)    3. Adjustment disorder with anxious mood    4. Epigastric pain    5. Orthostatic hypotension dysautonomic syndrome (H)         DISCHARGE MEDICATIONS:         Medication List          These changes are accurate as of 18 12:28 PM.  If you have any questions, ask your nurse or doctor.               CHANGE how you take these medications          aluminum-magnesium hydroxide 200-200 mg/5 mL suspension   Take 10 mL by mouth every 6 (six) hours as needed for indigestion.   What changed:    - how much to take  - when to take this       carbidopa-levodopa  mg per tablet   Commonly known as:  SINEMET   Take 2 tablets by mouth 3 (three) times a day.   What changed:  additional instructions       diclofenac sodium 1 % Gel   Commonly known as:  VOLTAREN   Lower extremities: 4 g to the affected area 4 times daily. Upper extremities: 2 g to the affected area 4 times daily. Do not exceed 32 g total dose per day.   What changed:    - how much to take  - when to take this  -  additional instructions       spironolactone 25 MG tablet   Commonly known as:  ALDACTONE   Take 0.5 tablets (12.5 mg total) by mouth daily.   What changed:  when to take this       triamcinolone 0.1 % cream   Commonly known as:  KENALOG   Apply thin layer to feet up to twice daily as needed   What changed:    - when to take this  - reasons to take this  - additional instructions         CONTINUE taking these medications          acetaminophen 500 MG tablet   Commonly known as:  TYLENOL   Take 2 tablets (1,000 mg total) by mouth 3 (three) times a day.       bisacodyl 10 mg suppository   Commonly known as:  DULCOLAX       CALCIUM CITRATE ORAL       cholecalciferol (vitamin D3) 1,000 unit tablet       citalopram 20 MG tablet   Commonly known as:  celeXA   Take 1.5 tablets (30 mg total) by mouth Daily after lunch.       * gabapentin 100 MG capsule   Commonly known as:  NEURONTIN       * gabapentin 100 MG capsule   Commonly known as:  NEURONTIN       levothyroxine 137 MCG tablet   Commonly known as:  SYNTHROID, LEVOTHROID       lidocaine 4 % patch       losartan 50 MG tablet   Commonly known as:  COZAAR       MAGNESIUM HYDROXIDE (BULK) MISC       melatonin 3 mg Tab tablet       MULTIPLE VITAMIN ORAL       nystatin powder   Commonly known as:  MYCOSTATIN       omeprazole 20 MG capsule   Commonly known as:  PriLOSEC       peg 400-propylene glycol 0.4-0.3 % Drop   Commonly known as:  SYSTANE       polyethylene glycol 17 gram packet   Commonly known as:  MIRALAX       ranitidine 150 MG tablet   Commonly known as:  ZANTAC       simethicone 80 MG chewable tablet   Commonly known as:  MYLICON       sucralfate 100 mg/mL suspension   Commonly known as:  CARAFATE       * Notice:  This list has 2 medication(s) that are the same as other medications prescribed for you. Read the directions carefully, and ask your doctor or other care provider to review them with you.      STOP taking these medications          amLODIPine 2.5 MG  tablet   Commonly known as:  NORVASC   Stopped by:  Jaci Soriano CNP       docusate sodium 100 MG capsule   Commonly known as:  COLACE   Stopped by:  Jaci Soriano CNP       ibandronate 150 mg tablet   Commonly known as:  BONIVA   Stopped by:  Jaci Soriano CNP             HISTORY OF PRESENT ILLNESS: Mehreen Camara is a 78 y.o. female: Prior pt in TCU, dc last May with A services. Then, on July 4th she went to the ER at [facility] with extreme weakness. She was dc to a TCU in [facility], on 7/9 but now transferred to this TCU. PMH includes:  severe orthostatic hypotension and chronic back and SI joint pain with venous stasis and AAA status post stent with moderate aortic stenoses ; hypothyroidism;  hyperlipidemia.  She presented with increasing leg weakness for 1 week after undergoing an SI joint steroid injection, with increasing bilateral lower extremity leg weakness with some gait instability noted.  Apparently she has had similar issues in the past after getting an SI joint injection. She is not a good historian and tends to wander during her ROS and assessment.     SKILLED NURSING FACILITY COURSE:  During this TCU stay, patient completed all anticipated goals of therapy.  During her stay in the TCU she continued to have lower extremity weakness, as well as epigastric pain and variable blood pressures with extreme orthostasis.  Her blood pressures range from 180s over 102 80s over 50s.  She has had some adjustment in her blood pressure meds while in the TCU with minimal improvement and orthostatics.  She does become symptomatic with dizziness and nausea.  She will follow-up with cardiology at discharge, and GI for EGD at her request.    PHYSICAL EXAMINATION:    Vitals:    08/08/18 1215   BP: (!) 85/58   Pulse: 97   Resp: 18   Temp: 98.2  F (36.8  C)   SpO2: 97%         General appearance: alert, appears stated age and cooperative.  Sitting in her wheelchair.  Head: Normocephalic,  without obvious abnormality, atraumatic, Eyes: sclera anicteric.  Lungs: Clear, respirations without effort.   Cardiovascular: S1, S2. Regular rate and rhythm.   ABDOMEN: Globular and soft, non tender to palpation.  Extremities: extremities normal, atraumatic, no cyanosis or edema  Skin: Skin warm and dry, turgor normal. No rashes or lesions  Neurologic: oriented. No focal deficits.   Psych: interacts well with caregivers, exhibits logical thought processes and connections, anxious.      LABS:  All labs reviewed in the nursing home record.    DISCHARGE PLAN: I certify that this patient is under Dr. Quintana's care, seen by the NP, and had a face-to-face encounter that meets the physician face-to-face encounter requirements.  The encounter was in whole, or part related to the primary reason for home health.  The Patient is homebound due to: weakness; it is, taxing and it will take a considerable amount of effort for patient to leave the home.  She is dependent on others for transportation.  The patient is confined to her home and needs intermittent skilled nursing, PT,OT, RN, SW, and HHA.  The patient has been under the care of Dr. Quintana/NP and Dr. Quintana  initiated the establishment of the plan of care.        Patient to be followed by home care for physical therapy to eval and treat for strengthening, balance, endurance, and safety with mobility, and ambulation.  Patient to be followed by home care for occupational therapy to eval and treat for strengthening, ADL needs, adaptive equipment, and safety.  Patient to be followed by home care for nursing services for medication set up and teaching, symptom and disease processes monitoring and education.    Patient to be followed by home care for home health aid services for bathing and ADL needs.  Planned discharge.  All therapy goals have been met.  Family will assist with discharge and transportation.    Patient will follow up with PCP within 7 days after discharge for  medication mangagment and appropriate lab studies: needs TSH next week. F/u with cardiology and GI as planned.    Equipment:   Wheelchair  Ok for front wheeled walker/Four wheeled walker for use after discharge    Patient received a hard script for NA      Electronically signed by:  Becky Soriano  This progress note was completed using Dragon software and there may be grammatical errors.      For documentation purposes, chart review, medication management, and discharge coordination of care was greater than 35 minutes

## 2021-06-29 NOTE — PROGRESS NOTES
"Progress Notes by Parag Forrest MD (Ted) at 10/23/2020 10:50 AM     Author: Parag Forrest MD (Ted) Service: -- Author Type: Physician    Filed: 10/23/2020 11:59 AM Encounter Date: 10/23/2020 Status: Signed    : Parag Forrest MD (Ted) (Physician)           Cardiology Progress Note    Assessment:  Hypertension,  labile with  pronounced orthostatic drops/autonomic dysfunction, satisfactory control  Multiple system atrophy with progressive dementia  History of abdominal aortic aneurysm, status post endovascular repair  Aortic stenosis, mild to moderate  GERD      Plan:  Reassess severity of aortic stenosis for prognostic implications.  I doubt that she will ever be candidate for replacement.    Continue current medications    Follow-up in 1 year    Subjective:   This is 80 y.o. female who comes in today follow-up visit.  She reports no chest pain or increasing shortness of breath.  She has not fallen down lately.  She has had worsening memory and confusion.  She has been diagnosed with dementia which is thought to be part of multiple system atrophy.    Review of Systems:   General: WNL  Eyes: Visual Distubance  Ears/Nose/Throat: WNL  Lungs: WNL  Heart: WNL  Stomach: WNL  Bladder: Frequent Urination at Night  Muscle/Joints: Joint Pain, Muscle Weakness, Muscle Pain, WNL  Skin: WNL  Nervous System: Daytime Sleepiness, Dizziness, Loss of Balance  Mental Health: Confusion, Depression, Anxiety     Blood: WNL    Objective:   /80 (Patient Site: Left Arm, Patient Position: Sitting, Cuff Size: Adult Regular)   Pulse 69   Resp 14   Ht 5' 6\" (1.676 m)   Wt 155 lb (70.3 kg)   BMI 25.02 kg/m    Physical Exam:  GENERAL: no distress  NECK: No JVD  LUNGS: Clear to auscultation.  CARDIAC: regular rhythm, S1 & S2 normal.  No heaves, thrills, gallops 3/6 systolic ejection murmur at the aortic area  ABDOMEN: flat, negative hepatosplenomegaly, soft and non-tender.  EXTREMITIES: No " evidence of cyanosis, clubbing or edema.    Current Outpatient Medications   Medication Sig Dispense Refill   ? acetaminophen (TYLENOL) 500 MG tablet Take 500 mg by mouth 3 (three) times a day.     ? bisacodyl (DULCOLAX) 10 mg suppository Insert 10 mg into the rectum daily as needed.     ? bisacodyL (DULCOLAX) 5 mg EC tablet Take 5 mg by mouth daily.     ? calcium, as carbonate, (TUMS) 200 mg calcium (500 mg) chewable tablet Chew 1 tablet every 2 (two) hours as needed for heartburn.      ? carbidopa-levodopa (SINEMET)  mg per tablet Take 2 tablets by mouth 3 (three) times a day. (Patient taking differently: Take 2 tablets by mouth 3 (three) times a day. Times of administration: 0700/1230/1830)  0   ? cholecalciferol, vitamin D3, 1,000 unit tablet Take 2,000 Units by mouth daily.      ? clotrimazole (LOTRIMIN) 1 % cream Apply 1 application topically 2 (two) times a day as needed. perineal rash     ? gabapentin (NEURONTIN) 100 MG capsule Take 200 mg by mouth 3 (three) times a day.  0   ? HYDROcodone-acetaminophen 5-325 mg per tablet Take 1 tablet by mouth every 8 (eight) hours as needed for pain (for pain 9-10/10). 20 tablet 0   ? levothyroxine (SYNTHROID, LEVOTHROID) 137 MCG tablet Take 1 tablet (137 mcg total) by mouth daily. 90 tablet 2   ? losartan (COZAAR) 50 MG tablet TAKE TABLET BY MOUTH EVERY MORNING 90 tablet 1   ? melatonin 3 mg Tab tablet Take 3 mg by mouth at bedtime as needed.      ? multivitamin (MULTIPLE VITAMIN ORAL) Take 1 tablet by mouth daily.     ? pantoprazole (PROTONIX) 20 MG tablet Take 20 mg by mouth daily.     ? peg 400-propylene glycol (SYSTANE) 0.4-0.3 % Drop Administer 2 drops to both eyes 4 (four) times a day.  0   ? polyethylene glycol (MIRALAX) 17 gram packet Take 17 g by mouth daily.     ? sertraline (ZOLOFT) 25 MG tablet Take 3 tablets (75 mg total) by mouth Daily after lunch.  0   ? simethicone (MYLICON) 125 MG chewable tablet Chew 125 mg every 6 (six) hours as needed for  flatulence.     ? zinc oxide (DESITIN) 13 % Crea Apply 1 application topically daily. vulva       No current facility-administered medications for this visit.        Cardiographics:    Holter: June 2017  Normal     Echo: September 2018    When compared to the previous study dated 5/6/2017, no significant change.    Left ventricle ejection fraction is normal. The calculated left ventricular ejection fraction is 65%.    Mild concentric left ventricular hypertrophy    Normal right ventricular size and systolic function.    Mild aortic stenosis with trace aortic insufficiency         Stress Test: November 2015  1. Lexiscan stress nuclear study is negative for inducible myocardial   ischemia or infarction.     Lab Results:       Lab Results   Component Value Date    CHOL 256 (H) 09/22/2015    CHOL 241 (H) 03/10/2015    CHOL 252 (H) 11/10/2014     Lab Results   Component Value Date    HDL 43 09/22/2015    HDL 43 03/10/2015    HDL 35 (L) 11/10/2014     Lab Results   Component Value Date    LDLCALC 195 (H) 09/22/2015    LDLCALC 168 (H) 03/10/2015    LDLCALC 174 (H) 11/10/2014     Lab Results   Component Value Date    TRIG 91 09/22/2015    TRIG 149 03/10/2015    TRIG 216 (H) 11/10/2014     BNP   Date Value Ref Range Status   10/25/2015 69 0 - 137 pg/mL Final       Parag (Hector)  MD Adriane

## 2021-07-03 NOTE — ADDENDUM NOTE
Addendum Note by Mary Bentley MD at 9/4/2018  3:00 PM     Author: Mary Bentley MD Service: -- Author Type: Physician    Filed: 9/4/2018  3:00 PM Encounter Date: 9/4/2018 Status: Signed    : Mary Bentley MD (Physician)    Addended by: MARY BENTLEY on: 9/4/2018 03:00 PM        Modules accepted: Orders

## 2021-07-03 NOTE — ADDENDUM NOTE
Addendum Note by Danni Ortiz MD at 4/19/2017  2:31 PM     Author: Danni Ortiz MD Service: -- Author Type: Physician    Filed: 4/19/2017  2:31 PM Encounter Date: 4/17/2017 Status: Signed    : Danni Ortiz MD (Physician)    Addended by: DANNI ORTIZ on: 4/19/2017 02:31 PM        Modules accepted: Orders

## 2021-07-21 ENCOUNTER — RECORDS - HEALTHEAST (OUTPATIENT)
Dept: ADMINISTRATIVE | Facility: CLINIC | Age: 81
End: 2021-07-21

## 2021-09-19 ENCOUNTER — HEALTH MAINTENANCE LETTER (OUTPATIENT)
Age: 81
End: 2021-09-19

## 2021-11-14 ENCOUNTER — HEALTH MAINTENANCE LETTER (OUTPATIENT)
Age: 81
End: 2021-11-14

## 2021-11-24 ENCOUNTER — HOSPITAL ENCOUNTER (OUTPATIENT)
Facility: CLINIC | Age: 81
End: 2021-11-24
Attending: ORTHOPAEDIC SURGERY | Admitting: ORTHOPAEDIC SURGERY
Payer: COMMERCIAL

## 2021-11-30 DIAGNOSIS — Z11.59 ENCOUNTER FOR SCREENING FOR OTHER VIRAL DISEASES: ICD-10-CM

## 2022-02-27 ENCOUNTER — HOSPITAL ENCOUNTER (OUTPATIENT)
Facility: CLINIC | Age: 82
Setting detail: OBSERVATION
Discharge: NURSING, REHAB OR LONG-TERM CARE | End: 2022-02-28
Attending: EMERGENCY MEDICINE | Admitting: HOSPITALIST
Payer: COMMERCIAL

## 2022-02-27 ENCOUNTER — APPOINTMENT (OUTPATIENT)
Dept: CT IMAGING | Facility: CLINIC | Age: 82
End: 2022-02-27
Attending: EMERGENCY MEDICINE
Payer: COMMERCIAL

## 2022-02-27 ENCOUNTER — APPOINTMENT (OUTPATIENT)
Dept: CT IMAGING | Facility: CLINIC | Age: 82
End: 2022-02-27
Payer: COMMERCIAL

## 2022-02-27 DIAGNOSIS — K58.1 IRRITABLE BOWEL SYNDROME WITH CONSTIPATION: ICD-10-CM

## 2022-02-27 DIAGNOSIS — R10.84 ABDOMINAL PAIN, GENERALIZED: ICD-10-CM

## 2022-02-27 DIAGNOSIS — S32.82XA MULTIPLE CLOSED FRACTURES OF PELVIS WITHOUT DISRUPTION OF PELVIC RING, INITIAL ENCOUNTER (H): ICD-10-CM

## 2022-02-27 DIAGNOSIS — K59.00 CONSTIPATION: ICD-10-CM

## 2022-02-27 LAB
ALBUMIN SERPL-MCNC: 3.8 G/DL (ref 3.5–5)
ALBUMIN UR-MCNC: NEGATIVE MG/DL
ALP SERPL-CCNC: 57 U/L (ref 45–120)
ALT SERPL W P-5'-P-CCNC: <9 U/L (ref 0–45)
ANION GAP SERPL CALCULATED.3IONS-SCNC: 10 MMOL/L (ref 5–18)
APPEARANCE UR: CLEAR
AST SERPL W P-5'-P-CCNC: 11 U/L (ref 0–40)
BACTERIA #/AREA URNS HPF: ABNORMAL /HPF
BILIRUB DIRECT SERPL-MCNC: 0.2 MG/DL
BILIRUB SERPL-MCNC: 0.5 MG/DL (ref 0–1)
BILIRUB UR QL STRIP: NEGATIVE
BUN SERPL-MCNC: 14 MG/DL (ref 8–28)
CALCIUM SERPL-MCNC: 8.9 MG/DL (ref 8.5–10.5)
CHLORIDE BLD-SCNC: 104 MMOL/L (ref 98–107)
CO2 SERPL-SCNC: 27 MMOL/L (ref 22–31)
COLOR UR AUTO: ABNORMAL
CREAT SERPL-MCNC: 0.61 MG/DL (ref 0.6–1.1)
ERYTHROCYTE [DISTWIDTH] IN BLOOD BY AUTOMATED COUNT: 12.3 % (ref 10–15)
GFR SERPL CREATININE-BSD FRML MDRD: 89 ML/MIN/1.73M2
GLUCOSE BLD-MCNC: 98 MG/DL (ref 70–125)
GLUCOSE UR STRIP-MCNC: NEGATIVE MG/DL
HCT VFR BLD AUTO: 40.3 % (ref 35–47)
HGB BLD-MCNC: 12.7 G/DL (ref 11.7–15.7)
HGB UR QL STRIP: NEGATIVE
KETONES UR STRIP-MCNC: NEGATIVE MG/DL
LACTATE SERPL-SCNC: 0.7 MMOL/L (ref 0.7–2)
LEUKOCYTE ESTERASE UR QL STRIP: NEGATIVE
LIPASE SERPL-CCNC: 14 U/L (ref 0–52)
MCH RBC QN AUTO: 31 PG (ref 26.5–33)
MCHC RBC AUTO-ENTMCNC: 31.5 G/DL (ref 31.5–36.5)
MCV RBC AUTO: 98 FL (ref 78–100)
NITRATE UR QL: NEGATIVE
PH UR STRIP: 7.5 [PH] (ref 5–7)
PHOSPHATE SERPL-MCNC: 3.2 MG/DL (ref 2.5–4.5)
PLATELET # BLD AUTO: 210 10E3/UL (ref 150–450)
POTASSIUM BLD-SCNC: 4.1 MMOL/L (ref 3.5–5)
PROT SERPL-MCNC: 7.1 G/DL (ref 6–8)
RBC # BLD AUTO: 4.1 10E6/UL (ref 3.8–5.2)
RBC URINE: 1 /HPF
SARS-COV-2 RNA RESP QL NAA+PROBE: NEGATIVE
SODIUM SERPL-SCNC: 141 MMOL/L (ref 136–145)
SP GR UR STRIP: 1.01 (ref 1–1.03)
SQUAMOUS EPITHELIAL: <1 /HPF
TSH SERPL DL<=0.005 MIU/L-ACNC: 1.69 UIU/ML (ref 0.3–5)
UROBILINOGEN UR STRIP-MCNC: <2 MG/DL
WBC # BLD AUTO: 5.4 10E3/UL (ref 4–11)
WBC URINE: 2 /HPF

## 2022-02-27 PROCEDURE — 83690 ASSAY OF LIPASE: CPT | Performed by: PHYSICIAN ASSISTANT

## 2022-02-27 PROCEDURE — 87635 SARS-COV-2 COVID-19 AMP PRB: CPT | Performed by: PHYSICIAN ASSISTANT

## 2022-02-27 PROCEDURE — G0378 HOSPITAL OBSERVATION PER HR: HCPCS

## 2022-02-27 PROCEDURE — 96374 THER/PROPH/DIAG INJ IV PUSH: CPT

## 2022-02-27 PROCEDURE — 85027 COMPLETE CBC AUTOMATED: CPT | Performed by: PHYSICIAN ASSISTANT

## 2022-02-27 PROCEDURE — 84443 ASSAY THYROID STIM HORMONE: CPT | Performed by: HOSPITALIST

## 2022-02-27 PROCEDURE — 96375 TX/PRO/DX INJ NEW DRUG ADDON: CPT

## 2022-02-27 PROCEDURE — 99207 PR CDG-CODE CATEGORY CHANGED: CPT | Performed by: HOSPITALIST

## 2022-02-27 PROCEDURE — 84100 ASSAY OF PHOSPHORUS: CPT | Performed by: HOSPITALIST

## 2022-02-27 PROCEDURE — 250N000011 HC RX IP 250 OP 636: Performed by: PHYSICIAN ASSISTANT

## 2022-02-27 PROCEDURE — 81001 URINALYSIS AUTO W/SCOPE: CPT | Performed by: PHYSICIAN ASSISTANT

## 2022-02-27 PROCEDURE — C9803 HOPD COVID-19 SPEC COLLECT: HCPCS

## 2022-02-27 PROCEDURE — 82310 ASSAY OF CALCIUM: CPT | Performed by: PHYSICIAN ASSISTANT

## 2022-02-27 PROCEDURE — 74177 CT ABD & PELVIS W/CONTRAST: CPT

## 2022-02-27 PROCEDURE — 72131 CT LUMBAR SPINE W/O DYE: CPT

## 2022-02-27 PROCEDURE — 96361 HYDRATE IV INFUSION ADD-ON: CPT

## 2022-02-27 PROCEDURE — 250N000013 HC RX MED GY IP 250 OP 250 PS 637: Performed by: HOSPITALIST

## 2022-02-27 PROCEDURE — 82248 BILIRUBIN DIRECT: CPT | Performed by: PHYSICIAN ASSISTANT

## 2022-02-27 PROCEDURE — 36415 COLL VENOUS BLD VENIPUNCTURE: CPT | Performed by: PHYSICIAN ASSISTANT

## 2022-02-27 PROCEDURE — 99285 EMERGENCY DEPT VISIT HI MDM: CPT | Mod: 25

## 2022-02-27 PROCEDURE — 258N000003 HC RX IP 258 OP 636: Performed by: PHYSICIAN ASSISTANT

## 2022-02-27 PROCEDURE — 99220 PR INITIAL OBSERVATION CARE,LEVEL III: CPT | Performed by: HOSPITALIST

## 2022-02-27 PROCEDURE — 83605 ASSAY OF LACTIC ACID: CPT | Performed by: PHYSICIAN ASSISTANT

## 2022-02-27 RX ORDER — OXYCODONE HYDROCHLORIDE 5 MG/1
5 TABLET ORAL
COMMUNITY
End: 2022-05-16

## 2022-02-27 RX ORDER — LABETALOL HYDROCHLORIDE 5 MG/ML
10 INJECTION, SOLUTION INTRAVENOUS ONCE
Status: COMPLETED | OUTPATIENT
Start: 2022-02-27 | End: 2022-02-27

## 2022-02-27 RX ORDER — LOSARTAN POTASSIUM 50 MG/1
50 TABLET ORAL 2 TIMES DAILY
Status: ON HOLD | COMMUNITY
End: 2023-01-01

## 2022-02-27 RX ORDER — HYDROMORPHONE HYDROCHLORIDE 1 MG/ML
0.5 INJECTION, SOLUTION INTRAMUSCULAR; INTRAVENOUS; SUBCUTANEOUS ONCE
Status: COMPLETED | OUTPATIENT
Start: 2022-02-27 | End: 2022-02-27

## 2022-02-27 RX ORDER — LOSARTAN POTASSIUM 25 MG/1
50 TABLET ORAL 2 TIMES DAILY
Status: DISCONTINUED | OUTPATIENT
Start: 2022-02-27 | End: 2022-02-28 | Stop reason: HOSPADM

## 2022-02-27 RX ORDER — ONDANSETRON 2 MG/ML
4 INJECTION INTRAMUSCULAR; INTRAVENOUS EVERY 6 HOURS PRN
Status: DISCONTINUED | OUTPATIENT
Start: 2022-02-27 | End: 2022-02-28 | Stop reason: HOSPADM

## 2022-02-27 RX ORDER — LEVOTHYROXINE SODIUM 100 UG/1
100 TABLET ORAL DAILY
Status: DISCONTINUED | OUTPATIENT
Start: 2022-02-28 | End: 2022-02-28 | Stop reason: HOSPADM

## 2022-02-27 RX ORDER — GABAPENTIN 300 MG/1
600 CAPSULE ORAL AT BEDTIME
Status: ON HOLD | COMMUNITY
End: 2022-05-20

## 2022-02-27 RX ORDER — HYDRALAZINE HCL 10 MG
5 TABLET ORAL 3 TIMES DAILY
Status: DISCONTINUED | OUTPATIENT
Start: 2022-02-27 | End: 2022-02-28 | Stop reason: HOSPADM

## 2022-02-27 RX ORDER — VITAMIN B COMPLEX
50 TABLET ORAL DAILY
Status: DISCONTINUED | OUTPATIENT
Start: 2022-02-28 | End: 2022-02-28 | Stop reason: HOSPADM

## 2022-02-27 RX ORDER — POLYETHYLENE GLYCOL 3350 17 G/17G
17 POWDER, FOR SOLUTION ORAL 2 TIMES DAILY PRN
Status: DISCONTINUED | OUTPATIENT
Start: 2022-02-27 | End: 2022-02-28 | Stop reason: HOSPADM

## 2022-02-27 RX ORDER — HYDROCHLOROTHIAZIDE 12.5 MG/1
12.5 CAPSULE ORAL DAILY
Status: DISCONTINUED | OUTPATIENT
Start: 2022-02-28 | End: 2022-02-28 | Stop reason: HOSPADM

## 2022-02-27 RX ORDER — LACTOBACILLUS RHAMNOSUS GG 10B CELL
1 CAPSULE ORAL DAILY
COMMUNITY
End: 2022-05-16

## 2022-02-27 RX ORDER — ONDANSETRON 4 MG/1
4 TABLET, ORALLY DISINTEGRATING ORAL EVERY 6 HOURS PRN
Status: DISCONTINUED | OUTPATIENT
Start: 2022-02-27 | End: 2022-02-28 | Stop reason: HOSPADM

## 2022-02-27 RX ORDER — AMOXICILLIN 250 MG
1-2 CAPSULE ORAL 2 TIMES DAILY
Status: DISCONTINUED | OUTPATIENT
Start: 2022-02-27 | End: 2022-02-28 | Stop reason: HOSPADM

## 2022-02-27 RX ORDER — MORPHINE SULFATE 4 MG/ML
4 INJECTION, SOLUTION INTRAMUSCULAR; INTRAVENOUS ONCE
Status: COMPLETED | OUTPATIENT
Start: 2022-02-27 | End: 2022-02-27

## 2022-02-27 RX ORDER — HYDROCHLOROTHIAZIDE 12.5 MG/1
12.5 CAPSULE ORAL DAILY
COMMUNITY
Start: 2022-02-10 | End: 2022-05-16

## 2022-02-27 RX ORDER — GABAPENTIN 300 MG/1
300-600 CAPSULE ORAL 2 TIMES DAILY
Status: DISCONTINUED | OUTPATIENT
Start: 2022-02-28 | End: 2022-02-28 | Stop reason: HOSPADM

## 2022-02-27 RX ORDER — ACETAMINOPHEN 500 MG
1000 TABLET ORAL 3 TIMES DAILY
Status: DISCONTINUED | OUTPATIENT
Start: 2022-02-27 | End: 2022-02-28 | Stop reason: HOSPADM

## 2022-02-27 RX ORDER — DULOXETIN HYDROCHLORIDE 60 MG/1
60 CAPSULE, DELAYED RELEASE ORAL DAILY
COMMUNITY
Start: 2022-02-10

## 2022-02-27 RX ORDER — PROCHLORPERAZINE 25 MG
12.5 SUPPOSITORY, RECTAL RECTAL EVERY 12 HOURS PRN
Status: DISCONTINUED | OUTPATIENT
Start: 2022-02-27 | End: 2022-02-28 | Stop reason: HOSPADM

## 2022-02-27 RX ORDER — ONDANSETRON 2 MG/ML
4 INJECTION INTRAMUSCULAR; INTRAVENOUS ONCE
Status: COMPLETED | OUTPATIENT
Start: 2022-02-27 | End: 2022-02-27

## 2022-02-27 RX ORDER — CARBIDOPA AND LEVODOPA 25; 100 MG/1; MG/1
1 TABLET ORAL 2 TIMES DAILY
Status: ON HOLD | COMMUNITY
End: 2022-05-19

## 2022-02-27 RX ORDER — QUETIAPINE FUMARATE 25 MG/1
25 TABLET, FILM COATED ORAL AT BEDTIME
Status: DISCONTINUED | OUTPATIENT
Start: 2022-02-27 | End: 2022-02-28 | Stop reason: HOSPADM

## 2022-02-27 RX ORDER — SIMETHICONE 125 MG
125 TABLET,CHEWABLE ORAL 4 TIMES DAILY PRN
Status: DISCONTINUED | OUTPATIENT
Start: 2022-02-27 | End: 2022-02-28 | Stop reason: HOSPADM

## 2022-02-27 RX ORDER — CARBIDOPA AND LEVODOPA 25; 100 MG/1; MG/1
1 TABLET ORAL 2 TIMES DAILY
Status: DISCONTINUED | OUTPATIENT
Start: 2022-02-27 | End: 2022-02-28 | Stop reason: HOSPADM

## 2022-02-27 RX ORDER — PROCHLORPERAZINE MALEATE 5 MG
5 TABLET ORAL EVERY 6 HOURS PRN
Status: DISCONTINUED | OUTPATIENT
Start: 2022-02-27 | End: 2022-02-28 | Stop reason: HOSPADM

## 2022-02-27 RX ORDER — GABAPENTIN 300 MG/1
600 CAPSULE ORAL AT BEDTIME
Status: DISCONTINUED | OUTPATIENT
Start: 2022-02-27 | End: 2022-02-28 | Stop reason: HOSPADM

## 2022-02-27 RX ORDER — MAGNESIUM CARB/ALUMINUM HYDROX 105-160MG
296 TABLET,CHEWABLE ORAL ONCE
Status: COMPLETED | OUTPATIENT
Start: 2022-02-27 | End: 2022-02-27

## 2022-02-27 RX ORDER — HYDRALAZINE HYDROCHLORIDE 10 MG/1
5 TABLET, FILM COATED ORAL 2 TIMES DAILY
Status: ON HOLD | COMMUNITY
End: 2022-05-20

## 2022-02-27 RX ORDER — BISACODYL 10 MG
10 SUPPOSITORY, RECTAL RECTAL DAILY PRN
Status: DISCONTINUED | OUTPATIENT
Start: 2022-02-27 | End: 2022-02-28 | Stop reason: HOSPADM

## 2022-02-27 RX ORDER — IOPAMIDOL 755 MG/ML
100 INJECTION, SOLUTION INTRAVASCULAR ONCE
Status: COMPLETED | OUTPATIENT
Start: 2022-02-27 | End: 2022-02-27

## 2022-02-27 RX ADMIN — QUETIAPINE FUMARATE 25 MG: 25 TABLET ORAL at 20:45

## 2022-02-27 RX ADMIN — IOPAMIDOL 100 ML: 755 INJECTION, SOLUTION INTRAVENOUS at 14:37

## 2022-02-27 RX ADMIN — HYDROMORPHONE HYDROCHLORIDE 0.5 MG: 1 INJECTION, SOLUTION INTRAMUSCULAR; INTRAVENOUS; SUBCUTANEOUS at 15:18

## 2022-02-27 RX ADMIN — LABETALOL HYDROCHLORIDE 10 MG: 5 INJECTION, SOLUTION INTRAVENOUS at 17:53

## 2022-02-27 RX ADMIN — SENNOSIDES AND DOCUSATE SODIUM 2 TABLET: 50; 8.6 TABLET ORAL at 20:43

## 2022-02-27 RX ADMIN — ONDANSETRON 4 MG: 2 INJECTION INTRAMUSCULAR; INTRAVENOUS at 13:53

## 2022-02-27 RX ADMIN — LOSARTAN POTASSIUM 50 MG: 25 TABLET, FILM COATED ORAL at 20:43

## 2022-02-27 RX ADMIN — POLYETHYLENE GLYCOL 400 AND PROPYLENE GLYCOL 2 DROP: 4; 3 SOLUTION/ DROPS OPHTHALMIC at 20:44

## 2022-02-27 RX ADMIN — MAGNESIUM CITRATE 296 ML: 1.75 LIQUID ORAL at 20:38

## 2022-02-27 RX ADMIN — MORPHINE SULFATE 4 MG: 4 INJECTION, SOLUTION INTRAMUSCULAR; INTRAVENOUS at 13:55

## 2022-02-27 RX ADMIN — GABAPENTIN 600 MG: 300 CAPSULE ORAL at 20:46

## 2022-02-27 RX ADMIN — SODIUM CHLORIDE 500 ML: 9 INJECTION, SOLUTION INTRAVENOUS at 13:50

## 2022-02-27 RX ADMIN — ACETAMINOPHEN 1000 MG: 500 TABLET ORAL at 20:42

## 2022-02-27 RX ADMIN — HYDRALAZINE HYDROCHLORIDE 5 MG: 10 TABLET ORAL at 20:44

## 2022-02-27 RX ADMIN — CARBIDOPA AND LEVODOPA 1 TABLET: 25; 100 TABLET ORAL at 20:44

## 2022-02-27 ASSESSMENT — ENCOUNTER SYMPTOMS
BLOOD IN STOOL: 0
CONSTIPATION: 0
CHILLS: 0
SORE THROAT: 0
FREQUENCY: 0
DIARRHEA: 0
DYSURIA: 0
VOMITING: 0
ABDOMINAL PAIN: 1
FEVER: 0
COUGH: 0
DIFFICULTY URINATING: 0
NAUSEA: 1
HEMATURIA: 0

## 2022-02-27 ASSESSMENT — ACTIVITIES OF DAILY LIVING (ADL)
DEPENDENT_IADLS:: CLEANING;COOKING;LAUNDRY;SHOPPING;MEAL PREPARATION;MEDICATION MANAGEMENT;MONEY MANAGEMENT;TRANSPORTATION

## 2022-02-27 NOTE — ED PROVIDER NOTES
Emergency Department Midlevel Supervisory Note     I personally saw the patient and performed a substantive portion of the visit including all aspects of the medical decision making.    ED Course:  5:07 PM Nazanin Meyers PA-C staffed patient with me. I agree with their assessment and plan of management, and I will see the patient.  5:22 PM I met with the patient to introduce myself, gather additional history, perform my initial exam, and discuss the plan.     Brief HPI:     Mehreen Camara is a 82 year old female who presents for evaluation of right lower abdominal pain over the past week. Pain worsened today, prompting evaluation. Pain is described as crampy and occasionally sharp. Patient has had normal bowel movements, last bowel movement was yesterday. Endorses nausea but no vomiting or black or bloody stools.     I, Carlyn Dowd, am serving as a scribe to document services personally performed by Martinez Pack MD, based on my observations and the provider's statements to me.   I, Martinez Pack MD, attest that Carlyn Dowd was acting in a scribe capacity, has observed my performance of the services and has documented them in accordance with my direction.    Brief Physical Exam:   Constitutional:  Alert, in no acute distress  EYES: Conjunctivae clear  HENT:  Atraumatic, normocephalic  Respiratory:  Respirations even, unlabored, in no acute respiratory distress  Cardiovascular:  Regular rate and rhythm, good peripheral perfusion  GI: Soft, nondistended, nontender, no palpable masses, no rebound, no guarding well-healed surgical incisions  Musculoskeletal:  Tenderness to midline lumbar spine, mild tenderness with pelvic compression  Integument: Warm, Dry, No erythema, No rash.   Neurologic:  Alert & oriented, no focal deficits noted  Psych: Normal mood and affect       ED Course as of 02/27/22 1837   Sun Feb 27, 2022   1822 Patient presents with severe right lower quadrant abdominal pain has a history of  repaired AAA   1822 History of chronic pain   1823 Differential includes AAA, ischemic bowel, constipation, renal colic, appendicitis, hernia, shingles, referred pain from back, pelvic pain   1823 Reports she fell the last few weeks   1823 Plan for CT abdomen pelvis and lumbar spine   1823 Received Dilaudid and morphine despite that is having significant amount of pain.   1823 Ischemic colitis unlikely based on history and exam and lactic acid   1823 Given enema with no improvement in symptoms   1823 CT shows stable aneurysm   1824 No rash to suggest shingles   1824 Hypertensive upon arrival with labetalol now normotensive   1824 Blood pressure unlikely cause of patient's pain   1824 Discussed discharge home but patient and patient's family are uncomfortable with this plan therefore admit for observation for further pain management       1. Constipation    2. Abdominal pain, generalized    3. Irritable bowel syndrome with constipation        Labs and Imaging:  Results for orders placed or performed during the hospital encounter of 02/27/22   CT Abdomen Pelvis w Contrast    Impression    IMPRESSION:   1.  No convincing acute abnormality.  2.  Patent and stable appearance of the aortobiiliac stent with significant decrease of the aneurysm sac.  3.  Moderate stool throughout the colon.  4.  Stable left adrenal nodule that is likely an adenoma.   Lumbar spine CT w/o contrast    Impression    IMPRESSION:    1.  Chronic L3-L5 compression fractures, unchanged from 12/23/2020.  2.  No evidence of new acute fracture or subluxation of the lumbar spine by CT imaging.   CBC (+ platelets, no diff)   Result Value Ref Range    WBC Count 5.4 4.0 - 11.0 10e3/uL    RBC Count 4.10 3.80 - 5.20 10e6/uL    Hemoglobin 12.7 11.7 - 15.7 g/dL    Hematocrit 40.3 35.0 - 47.0 %    MCV 98 78 - 100 fL    MCH 31.0 26.5 - 33.0 pg    MCHC 31.5 31.5 - 36.5 g/dL    RDW 12.3 10.0 - 15.0 %    Platelet Count 210 150 - 450 10e3/uL   Basic metabolic panel    Result Value Ref Range    Sodium 141 136 - 145 mmol/L    Potassium 4.1 3.5 - 5.0 mmol/L    Chloride 104 98 - 107 mmol/L    Carbon Dioxide (CO2) 27 22 - 31 mmol/L    Anion Gap 10 5 - 18 mmol/L    Urea Nitrogen 14 8 - 28 mg/dL    Creatinine 0.61 0.60 - 1.10 mg/dL    Calcium 8.9 8.5 - 10.5 mg/dL    Glucose 98 70 - 125 mg/dL    GFR Estimate 89 >60 mL/min/1.73m2   Hepatic function panel   Result Value Ref Range    Bilirubin Total 0.5 0.0 - 1.0 mg/dL    Bilirubin Direct 0.2 <=0.5 mg/dL    Protein Total 7.1 6.0 - 8.0 g/dL    Albumin 3.8 3.5 - 5.0 g/dL    Alkaline Phosphatase 57 45 - 120 U/L    AST 11 0 - 40 U/L    ALT <9 0 - 45 U/L   Result Value Ref Range    Lipase 14 0 - 52 U/L   UA with Microscopic reflex to Culture    Specimen: Urine, Catheter   Result Value Ref Range    Color Urine Light Yellow Colorless, Straw, Light Yellow, Yellow    Appearance Urine Clear Clear    Glucose Urine Negative Negative mg/dL    Bilirubin Urine Negative Negative    Ketones Urine Negative Negative mg/dL    Specific Gravity Urine 1.015 1.001 - 1.030    Blood Urine Negative Negative    pH Urine 7.5 (H) 5.0 - 7.0    Protein Albumin Urine Negative Negative mg/dL    Urobilinogen Urine <2.0 <2.0 mg/dL    Nitrite Urine Negative Negative    Leukocyte Esterase Urine Negative Negative    Bacteria Urine Few (A) None Seen /HPF    RBC Urine 1 <=2 /HPF    WBC Urine 2 <=5 /HPF    Squamous Epithelials Urine <1 <=1 /HPF   Lactic acid whole blood   Result Value Ref Range    Lactic Acid 0.7 0.7 - 2.0 mmol/L     I have reviewed the relevant laboratory and radiology studies    Procedures:  I was present for the key portions of this procedure:     Martinez Pack MD  [facility] EMERGENCY ROOM  [address] [id number] [phone]      Martinez Pack MD  02/27/22 6146

## 2022-02-27 NOTE — ED PROVIDER NOTES
"  Emergency Department Encounter     Evaluation Date & Time:   2/27/2022 12:32 PM    CHIEF COMPLAINT:  Abdominal Pain      Triage Note:Patient from [facility] here with daughter, has had RLQ pain for over one week, reports that it feels tight, hx of IBS, reports stools are \"like pudding\".  Glenny Maldonado RN.......2/27/2022 12:44 PM        Impression and Plan     ED COURSE & MEDICAL DECISION MAKING:  3:42 PM Checked on patient, appears very uncomfortable. Plan for enema.   6:25 PM Checked on patient, plan for admit. Paged for hospitalist.   6:37 PM Spoke with Dr. Yates, hospitalist.       Mehreen is an 82 year old female with PMH depression, AAA repair, constipation, chronic low back pain, GERD, memory impairments and resides at a [facility] but is very alert and communicative about today's concerns, presents to the ED for evaluation of abdominal pain. Reports ongoing abdominal pain for 1+ weeks. Bowel movements grossly normal, denies constipation and last BM yesterday. No blood in stools. No vomiting, fever, or other infectious symptoms.   Differential includes: colitis, bowel obstruction, diverticulitis, appendicitis, cystitis/pyelonephritis, ureterolithiasis, constipation, and others.  On exam patient patient has diffuse abdominal tenderness, worse RLQ. No guarding or distention.    Labs with no leukocytosis or anemia. No electrolyte derangement, creatinine is normal. LFT and lipase WNL. Urine is not consistent with infection. Lactate is negative, not consistent with ischemic bowel.  CT obtained, no convincing acute abnormality. Patent and stable aortobiliac stent with significant decrease of aneurysm sac. Patient does report a fall a few weeks ago, CT lumbar spine with chronic unchanged L3-L5 compression fractures.  Doubt appendicitis despite not seeing appendix on CT; no surrounding inflammatory changes, pain is not specifically localized to RLQ, and been ongoing for more than 1 week without leukocytosis, fevers, " or other infectious symptoms.    Presentation is most consistent with constipation. History of IBS and constipation, likely exacerbated by chronic pain (takes oxycodone and belbuca daily). Patient is already on multiple medicines for this (Dulcolax suppository PRN, MiraLAX daily, Culturell daily, Pericolace 2 times daily), will plan for enema in the emergency department. Enema did not offer significant relief. Daughter does report prior admission required multiple doses of magnesium citrate before she had relief. Patient is still very uncomfortable, there is also concern from patient and family that she may not be receiving all of her medicines as prescribed, there are delays in cares, and family does not feel she is being appropriately cared for. Family and patient do not feel comfortable returning to her facility.    Given this, plan to admit for pain control, further bowel management, may also benefit from social work/care management consultation for placement/facility concerns.       FINAL IMPRESSION:    ICD-10-CM    1. Constipation  K59.00    2. Abdominal pain, generalized  R10.84    3. Irritable bowel syndrome with constipation  K58.1            MEDICATIONS GIVEN IN THE EMERGENCY DEPARTMENT:  Medications   0.9% sodium chloride BOLUS (0 mLs Intravenous Stopped 2/27/22 1500)   morphine (PF) injection 4 mg (4 mg Intravenous Given 2/27/22 1355)   ondansetron (ZOFRAN) injection 4 mg (4 mg Intravenous Given 2/27/22 1353)   iopamidol (ISOVUE-370) solution 100 mL (100 mLs Intravenous Given 2/27/22 1437)   HYDROmorphone (PF) (DILAUDID) injection 0.5 mg (0.5 mg Intravenous Given 2/27/22 1518)   labetalol (NORMODYNE/TRANDATE) injection 10 mg (10 mg Intravenous Given 2/27/22 1753)       NEW PRESCRIPTIONS STARTED AT TODAY'S ED VISIT:  New Prescriptions    No medications on file       HPI     HPI     Mehreen Camara is a 82 year old female with a pertinent history of diverticulosis, SBO, HTN, HLD, Lewy body dementia,  AAA s/p repair (2015), s/p cholecystectomy, who presents to this ED by walk in for evaluation of abdominal pain.    Patient reports ongoing abdominal pain for over the past week that is more localized to RLQ. Pain worsened today, prompting evaluation. Pain is described as crampy and occasionally sharp. Patient has had normal bowel movements; last bowel movement was yesterday. Denies black or bloody stool. Patient does endorse some nausea but denies vomiting.     Patient does have history of HTN and reports taking morning medicines today. He is not on blood thinning medicines    Patient otherwise denies fever, chills, URI symptoms, urinary symptoms, or additional medical concerns or complaints at this time.      Of note, patient is here with daughter.    REVIEW OF SYSTEMS:  Review of Systems   Constitutional: Negative for chills and fever.   HENT: Negative for congestion and sore throat.    Respiratory: Negative for cough.    Gastrointestinal: Positive for abdominal pain (RLQ) and nausea. Negative for blood in stool, constipation, diarrhea and vomiting.   Genitourinary: Negative for difficulty urinating, dysuria, frequency and hematuria.   All other systems reviewed and are negative.    Medical History     Past Medical History:   Diagnosis Date     AAA (abdominal aortic aneurysm) (H)      Abdominal pain, epigastric 12/16/2013     Abnormal computed tomography scan 9/15/2016     Abnormal finding on imaging 9/15/2016     Acute encephalopathy 9/26/2015     Adjustment disorder with anxious mood 9/17/2018     Adjustment disorder with mixed anxiety and depressed mood 9/17/2018     Adrenal adenoma      Adrenal adenoma      Agitation      Anemia 11/18/2010     Aneurysm of abdominal vessel (H) 2/25/2008     Anxiety state 2/26/2008     Arthritis      Asymptomatic postmenopausal status      Back pain 5/16/2020     Harris esophagus      Harris's esophagus 1/11/2012     Benign neoplasm of ascending colon 9/19/2016     Bilateral  knee pain 8/5/2020     Bloating 5/5/2017     Bradycardia 9/17/2018     Cataract 9/17/2018     Chest wall pain 1/20/2013     Chronic low back pain 9/29/2016     Cognitive and behavioral changes      Compression fracture of lumbar vertebra (H) 10/10/2020     Constipation      Contact dermatitis and eczema 11/22/2006     DDD (degenerative disc disease), lumbosacral 2/26/2008     Depression      Depression, major 7/13/2009     Depressive disorder      Diaphragmatic hernia 9/18/2018     Dietary counseling and surveillance 9/15/2016     Disorder of bone and cartilage 9/17/2018     Diverticulosis of colon 12/18/2014     Dizziness and giddiness      Dysphagia 12/17/2013     Early satiety 12/16/2013     Esophageal reflux      Essential hypertension 11/22/2006     Fall 8/5/2020     Flatulence, eructation and gas pain 2/19/2014     Gaseous abdominal distention 9/19/2016     Gastro-esophageal reflux disease with esophagitis 6/6/2017     Generalized muscle weakness 7/4/2018     GERD (gastroesophageal reflux disease)      Hemorrhage of rectum and anus 9/19/2016     Hiatal hernia      HLD (hyperlipidemia)      Hoarseness of voice 4/6/2010     HTN (hypertension)      Hypertension      Hypokalemia 6/29/2007     Hypothyroid      Hypothyroidism 9/22/2009     Insomnia due to anxiety and fear 9/17/2018     Insomnia due to mental condition      Irritable bowel syndrome 11/22/2006     Lower back pain      Major depressive disorder, recurrent episode (H) 9/17/2018     Major depressive disorder, single episode, moderate (H)      Malaise and fatigue 7/10/2007     Metabolic encephalopathy      Migraine with aura      Mixed hyperlipidemia 2/10/2008     Mood disorder due to a general medical condition      Movement disorder 9/19/2018     Multiple system atrophy P (H)      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     Murmur      Nonrheumatic aortic valve stenosis 10/23/2020     Nontoxic multinodular goiter 11/22/2006     Obesity, unspecified       Orthostatic hypotension dysautonomic syndrome 9/22/2017     Osteoarthritis      Partial small bowel obstruction (H)      Polyp of colon 9/19/2016     Post-op pain 12/12/2018     Postoperative hypothyroidism 1/15/2013     Prediabetes 10/26/2010     Primary insomnia 7/4/2017     REM sleep behavior disorder 8/5/2020     Retinal migraine 4/28/2014     Rheumatic fever      Rupture of left Achilles tendon, sequela 12/31/2019     S/P AAA repair using bifurcation graft 11/30/2015     S/P laparoscopic cholecystectomy 12/12/2018     Scoliosis      Sleep difficulties      Small bowel obstruction (H)      Thyroid disease      Thyroid nodule      Tinnitus      Undiagnosed cardiac murmurs 11/22/2006     Vitamin D deficiency 1/15/2013     Weak 4/29/2018     Weakness 5/4/2017     Weakness of both lower extremities 7/5/2018       Past Surgical History:   Procedure Laterality Date     AAA REPAIR  2015    wt bifurcation graft     CARPAL TUNNEL RELEASE RT/LT Bilateral 2013     HC REVISE MEDIAN N/CARPAL TUNNEL SURG      Description: Neuroplasty Decompression Median Nerve At Carpal Tunnel;  Recorded: 01/21/2013;  Comments: bilateral     HYSTERECTOMY  2013     IR ABDOMINAL ENDOVASCULAR STENT GRAFT  11/30/2015     JOINT REPLACEMENT Right 2003     JOINT REPLACEMENT       LAPAROSCOPIC CHOLECYSTECTOMY N/A 12/12/2018    Procedure: LAPAROSCOPIC CHOLECYSTECTOMY;  Surgeon: Amadeo Sebastian MD;  Location: [facility] OR     ORTHOPEDIC SURGERY       AL THYROIDECTOMY      Description: Thyroid Surgery Total Thyroidectomy;  Recorded: 06/08/2011;     SPINE SURGERY  1981    cervical spine fusion     THYROIDECTOMY  2011     XR MAJOR JOINT OR BURSA INJ/ASP BILATERAL  5/18/2020     XR MAJOR JOINT OR BURSA INJ/ASP UNILATERAL  5/18/2020     ZZC CERV SPINE FUSN,ANTER,BELOW C2      Description: Cervical Vertebral Fusion;  Recorded: 01/21/2013;  Comments: 1981     ZZC TOTAL ABDOM HYSTERECTOMY  1985    Description: Hysterectomy;  Recorded: 01/21/2013;     ZZC TOTAL  HIP ARTHROPLASTY Right     Description: Total Hip Replacement;  Recorded: 2013;  Comments: right,        Family History   Problem Relation Age of Onset     Hypertension Mother      Coronary Artery Disease Mother      Coronary Artery Disease Father      Other Cancer Brother      Parkinsonism Other      Heart Disease Mother      Heart Disease Father      Cancer Brother 57.00        colon     Hypertension Daughter      Hypertension Daughter      Neuropathy Daughter      Lupus Daughter      Heart Disease Paternal Aunt      Heart Disease Paternal Uncle      Parkinsonism Paternal Uncle        Social History     Tobacco Use     Smoking status: Former Smoker     Packs/day: 0.50     Types: Cigarettes     Quit date: 1994     Years since quittin.2     Smokeless tobacco: Never Used   Substance Use Topics     Alcohol use: No     Drug use: No       acetaminophen (TYLENOL) 500 MG tablet  BELBUCA 75 MCG FILM buccal film  bisacodyl (DULCOLAX) 10 MG suppository  carbidopa-levodopa (SINEMET)  MG tablet  diclofenac (VOLTAREN) 1 % topical gel  DULoxetine (CYMBALTA) 60 MG capsule  gabapentin (NEURONTIN) 300 MG capsule  gabapentin (NEURONTIN) 300 MG capsule  hydrALAZINE (APRESOLINE) 10 MG tablet  hydrochlorothiazide (MICROZIDE) 12.5 MG capsule  lactobacillus rhamnosus, GG, (CULTURELL KIDS) packet  levothyroxine (SYNTHROID/LEVOTHROID) 100 MCG tablet  losartan (COZAAR) 50 MG tablet  oxyCODONE (ROXICODONE) 5 MG tablet  oxyCODONE (ROXICODONE) 5 MG tablet  polyethylene glycol (MIRALAX) 17 GM/Dose powder  polyethylene glycol-propylene glycol (SYSTANE ULTRA) 0.4-0.3 % SOLN ophthalmic solution  QUEtiapine (SEROQUEL) 25 MG tablet  QUEtiapine (SEROQUEL) 25 MG tablet  senna-docusate (SENOKOT-S/PERICOLACE) 8.6-50 MG tablet  simethicone (MYLICON) 125 MG chewable tablet  vitamin D3 (CHOLECALCIFEROL) 50 mcg (2000 units) tablet        Physical Exam     First Vitals:  Patient Vitals for the past 24 hrs:   BP Temp Temp src Pulse  Resp SpO2 Weight   02/27/22 1805 122/61 -- -- -- -- -- --   02/27/22 1658 (!) 198/98 -- -- 109 -- 90 % --   02/27/22 1507 (!) 215/107 -- -- 94 18 94 % --   02/27/22 1410 (!) 215/102 -- -- 91 20 94 % --   02/27/22 1341 (!) 230/112 -- -- 94 18 96 % --   02/27/22 1241 (!) 223/122 98.5  F (36.9  C) Oral 93 18 95 % 68 kg (150 lb)       PHYSICAL EXAM:   Physical Exam  Vitals and nursing note reviewed.   Constitutional:       Appearance: She is well-developed.   HENT:      Head: Normocephalic and atraumatic.   Cardiovascular:      Rate and Rhythm: Normal rate and regular rhythm.      Heart sounds: Murmur (Systolic III/VI) heard.   Pulmonary:      Effort: Pulmonary effort is normal.      Breath sounds: Normal breath sounds.   Abdominal:      General: Abdomen is flat.      Comments: Mild generalized abdominal tenderness, worse RLQ. No distention or masses   Skin:     General: Skin is warm and dry.   Neurological:      General: No focal deficit present.      Mental Status: She is alert and oriented to person, place, and time.   Psychiatric:         Mood and Affect: Mood normal.         Behavior: Behavior normal.           Results     LAB:  All pertinent labs reviewed and interpreted  Labs Ordered and Resulted from Time of ED Arrival to Time of ED Departure   ROUTINE UA WITH MICROSCOPIC REFLEX TO CULTURE - Abnormal       Result Value    Color Urine Light Yellow      Appearance Urine Clear      Glucose Urine Negative      Bilirubin Urine Negative      Ketones Urine Negative      Specific Gravity Urine 1.015      Blood Urine Negative      pH Urine 7.5 (*)     Protein Albumin Urine Negative      Urobilinogen Urine <2.0      Nitrite Urine Negative      Leukocyte Esterase Urine Negative      Bacteria Urine Few (*)     RBC Urine 1      WBC Urine 2      Squamous Epithelials Urine <1     CBC WITH PLATELETS - Normal    WBC Count 5.4      RBC Count 4.10      Hemoglobin 12.7      Hematocrit 40.3      MCV 98      MCH 31.0      MCHC 31.5       RDW 12.3      Platelet Count 210     BASIC METABOLIC PANEL - Normal    Sodium 141      Potassium 4.1      Chloride 104      Carbon Dioxide (CO2) 27      Anion Gap 10      Urea Nitrogen 14      Creatinine 0.61      Calcium 8.9      Glucose 98      GFR Estimate 89     HEPATIC FUNCTION PANEL - Normal    Bilirubin Total 0.5      Bilirubin Direct 0.2      Protein Total 7.1      Albumin 3.8      Alkaline Phosphatase 57      AST 11      ALT <9     LIPASE - Normal    Lipase 14     LACTIC ACID WHOLE BLOOD - Normal    Lactic Acid 0.7         RADIOLOGY:  Lumbar spine CT w/o contrast   Final Result   IMPRESSION:     1.  Chronic L3-L5 compression fractures, unchanged from 12/23/2020.   2.  No evidence of new acute fracture or subluxation of the lumbar spine by CT imaging.      CT Abdomen Pelvis w Contrast   Final Result   IMPRESSION:    1.  No convincing acute abnormality.   2.  Patent and stable appearance of the aortobiiliac stent with significant decrease of the aneurysm sac.   3.  Moderate stool throughout the colon.   4.  Stable left adrenal nodule that is likely an adenoma.                Nazanin Meyers PA-C  Emergency Medicine  [facility] EMERGENCY ROOM       Nazanin Meyers PA-C  02/27/22 1910       Nazanin Meyers PA-C  02/27/22 1925

## 2022-02-27 NOTE — ED TRIAGE NOTES
"Patient from [facility] care here with daughter, has had RLQ pain for over one week, reports that it feels tight, hx of IBS, reports stools are \"like pudding\".  Glenny Maldonado RN.......2/27/2022 12:44 PM  "

## 2022-02-27 NOTE — PHARMACY-ADMISSION MEDICATION HISTORY
Pharmacy Note - Admission Medication History    Pertinent Provider Information: None     ______________________________________________________________________    Prior To Admission (PTA) med list completed and updated in EMR.       PTA Med List   Medication Sig Last Dose     acetaminophen (TYLENOL) 500 MG tablet Take 2 tablets (1,000 mg) by mouth 3 times daily 2/27/2022 at 0800     BELBUCA 75 MCG FILM buccal film Place 75 mcg inside cheek 2 times daily  2/27/2022 at Unknown time     bisacodyl (DULCOLAX) 10 MG suppository Place 10 mg rectally daily as needed for constipation Unknown at Unknown time     carbidopa-levodopa (SINEMET)  MG tablet Take 1 tablet by mouth 2 times daily 2/27/2022 at x 1     diclofenac (VOLTAREN) 1 % topical gel Apply 2 g topically 2 times daily as needed for moderate pain Unknown at Unknown time     DULoxetine (CYMBALTA) 60 MG capsule Take 60 mg by mouth daily 2/27/2022 at Unknown time     gabapentin (NEURONTIN) 300 MG capsule Take 600 mg by mouth At Bedtime 2/26/2022 at Unknown time     gabapentin (NEURONTIN) 300 MG capsule Take 300-600 mg by mouth 2 times daily  2/27/2022 at AM and 1400     hydrALAZINE (APRESOLINE) 10 MG tablet Take 5 mg by mouth 3 times daily 2/27/2022 at x 1     hydrochlorothiazide (MICROZIDE) 12.5 MG capsule Take 12.5 mg by mouth daily 2/27/2022 at Unknown time     lactobacillus rhamnosus, GG, (CULTURELL KIDS) packet Take 1 packet by mouth daily 2/27/2022 at Unknown time     levothyroxine (SYNTHROID/LEVOTHROID) 100 MCG tablet Take 1 tablet (100 mcg) by mouth daily 2/27/2022 at Unknown time     losartan (COZAAR) 50 MG tablet Take 50 mg by mouth 2 times daily 2/27/2022 at Unknown time     oxyCODONE (ROXICODONE) 5 MG tablet Take 5 mg by mouth daily at 2 pm 2/26/2022 at Unknown time     oxyCODONE (ROXICODONE) 5 MG tablet Take 1 tablet (5 mg) by mouth every 4 hours as needed for severe pain Unknown at Unknown time     polyethylene glycol (MIRALAX) 17 GM/Dose powder  Take 17 g by mouth 2 times daily as needed for constipation Unknown at Unknown time     polyethylene glycol-propylene glycol (SYSTANE ULTRA) 0.4-0.3 % SOLN ophthalmic solution Place 2 drops into both eyes 3 times daily (and additionally as needed/requested) 2/27/2022 at Unknown time     QUEtiapine (SEROQUEL) 25 MG tablet Take 1 tablet (25 mg) by mouth At Bedtime 2/26/2022 at Unknown time     QUEtiapine (SEROQUEL) 25 MG tablet Take 0.5 tablets (12.5 mg) by mouth 3 times daily as needed (agitation) Unknown at Unknown time     senna-docusate (SENOKOT-S/PERICOLACE) 8.6-50 MG tablet Take 1-2 tablets by mouth 2 times daily 2/27/2022 at Unknown time     simethicone (MYLICON) 125 MG chewable tablet Take 125 mg by mouth 3 times daily as needed Unknown at Unknown time     vitamin D3 (CHOLECALCIFEROL) 50 mcg (2000 units) tablet Take 1 tablet (50 mcg) by mouth daily 2/27/2022 at Unknown time       Information source(s): Family member and Facility (TCU/NH/) medication list/MAR  Method of interview communication: in-person    Summary of Changes to PTA Med List  New: None  Discontinued: None  Changed: Belbuca dose reduced to 75mcg    Patient was asked about OTC/herbal products specifically.  PTA med list reflects this.    In the past week, patient estimated taking medication this percent of the time:  greater than 90%.    Allergies were reviewed, assessed, and updated with the patient.      Patient does not use any multi-dose medications prior to admission.    The information provided in this note is only as accurate as the sources available at the time of the update(s).    Thank you for the opportunity to participate in the care of this patient.    Carlos Lawson [facility]  2/27/2022 5:57 PM

## 2022-02-28 ENCOUNTER — APPOINTMENT (OUTPATIENT)
Dept: PHYSICAL THERAPY | Facility: CLINIC | Age: 82
End: 2022-02-28
Attending: HOSPITALIST
Payer: COMMERCIAL

## 2022-02-28 VITALS
BODY MASS INDEX: 24.87 KG/M2 | HEART RATE: 62 BPM | DIASTOLIC BLOOD PRESSURE: 57 MMHG | WEIGHT: 154.1 LBS | SYSTOLIC BLOOD PRESSURE: 105 MMHG | OXYGEN SATURATION: 93 % | RESPIRATION RATE: 16 BRPM | TEMPERATURE: 97.8 F

## 2022-02-28 PROCEDURE — 97116 GAIT TRAINING THERAPY: CPT | Mod: GP

## 2022-02-28 PROCEDURE — 250N000013 HC RX MED GY IP 250 OP 250 PS 637: Performed by: HOSPITALIST

## 2022-02-28 PROCEDURE — 97161 PT EVAL LOW COMPLEX 20 MIN: CPT | Mod: GP

## 2022-02-28 PROCEDURE — G0378 HOSPITAL OBSERVATION PER HR: HCPCS

## 2022-02-28 PROCEDURE — 99217 PR OBSERVATION CARE DISCHARGE: CPT | Performed by: INTERNAL MEDICINE

## 2022-02-28 PROCEDURE — 250N000013 HC RX MED GY IP 250 OP 250 PS 637: Performed by: INTERNAL MEDICINE

## 2022-02-28 RX ORDER — POLYETHYLENE GLYCOL 3350 17 G/17G
17 POWDER, FOR SOLUTION ORAL DAILY
Status: DISCONTINUED | OUTPATIENT
Start: 2022-02-28 | End: 2022-02-28 | Stop reason: HOSPADM

## 2022-02-28 RX ORDER — POLYETHYLENE GLYCOL 3350 17 G/17G
17 POWDER, FOR SOLUTION ORAL DAILY
Qty: 510 G | COMMUNITY
Start: 2022-02-28 | End: 2022-07-14

## 2022-02-28 RX ADMIN — HYDRALAZINE HYDROCHLORIDE 5 MG: 10 TABLET ORAL at 09:16

## 2022-02-28 RX ADMIN — Medication 50 MCG: at 08:57

## 2022-02-28 RX ADMIN — LEVOTHYROXINE SODIUM 100 MCG: 100 TABLET ORAL at 05:04

## 2022-02-28 RX ADMIN — CARBIDOPA AND LEVODOPA 1 TABLET: 25; 100 TABLET ORAL at 08:57

## 2022-02-28 RX ADMIN — POLYETHYLENE GLYCOL 400 AND PROPYLENE GLYCOL 2 DROP: 4; 3 SOLUTION/ DROPS OPHTHALMIC at 08:57

## 2022-02-28 RX ADMIN — ACETAMINOPHEN 1000 MG: 500 TABLET ORAL at 13:24

## 2022-02-28 RX ADMIN — ACETAMINOPHEN 1000 MG: 500 TABLET ORAL at 08:57

## 2022-02-28 RX ADMIN — GABAPENTIN 300 MG: 300 CAPSULE ORAL at 13:24

## 2022-02-28 RX ADMIN — GABAPENTIN 600 MG: 300 CAPSULE ORAL at 08:57

## 2022-02-28 RX ADMIN — HYDROCHLOROTHIAZIDE 12.5 MG: 12.5 CAPSULE ORAL at 08:57

## 2022-02-28 RX ADMIN — POLYETHYLENE GLYCOL 3350 17 G: 17 POWDER, FOR SOLUTION ORAL at 13:24

## 2022-02-28 RX ADMIN — LOSARTAN POTASSIUM 50 MG: 25 TABLET, FILM COATED ORAL at 08:57

## 2022-02-28 NOTE — PROGRESS NOTES
[facility]  Hospitalist Progress Note    Admit Date:  2/27/2022  Date of Service (when I saw the patient): 02/28/2022   Provider:  Mayra Donovna DO    Assessment & Plan   Mehreen Camara is a 82 year old female who was admitted on 2/27/2022 with RLQ abd pain  All other systems reviewed and are negative  In the ED, patient was initially quite hypertensive which improved with management, afebrile.  She has a normal lactic acid, LFTs, BMP, CBC.  CT abdomen negative for acute finding, patent aorto iliac stent, decreased aneurysmal sac, moderate stool in the colon, left adrenal nodule. Received morphine, Zofran, fluid bolus, Dilaudid, enema without results.  She was admitted, received mag citrate with BM and is improved.       Problem List:    1. Slow transit constipation  Abdominal pain  Magnesium citrate given with BM.  Abd pain now has resolved.  Will continue with bid senna and daily miralax and monitor symptoms.  Will advance diet this am     2. Lewy body dementia without behavior disturbance  Multisystem atrophy  Hasn't seen neuro in 1 year, per admission note  PTA Sinemet, Seroquel, neruontin     3. Generalized weakness  Phos within normal limits  TSH within normal limits   PT consult pending     4. Adrenal incidentaloma  Defer secretory work-up until patient improved     5. Essential hypertension  On PTA hydralazine, cozaar and HCTZ     DVTP: lovenox  Code Status: Prior DNR  Disposition: Observation   Spoke with care manager  Medically stable for discharge - awaiting safe discharge planning       Diet: Diet  Advance Diet as Tolerated: Regular Diet Adult    DVT Prophylaxis: Enoxaparin (Lovenox) subcutaneous if needing to stay in the hospital for disposition issues  Diaz Catheter: Not present  Code Status: No CPR- Do NOT Intubate      Entered: Mayra Donovan DO 02/28/2022, 9:46 AM       The patient's care was discussed with the Bedside Nurse, Care  "Coordinator/ and Patient.    We are operating under sub optimal conditions in the setting of a world wide pandemic, hospitals are running at full capacity with limited bed availability. We are providing the best possible patient care with limited resources.    Interval History     \"I am ok\".  Denies abd pain currently.  Had BM last evening.  No N/V.  No other new concerns per nursing.      -Data reviewed today: I reviewed all new labs and imaging results over the last 24 hours. I personally reviewed no images or EKG's today.    Physical Exam   Temp: 97.8  F (36.6  C) Temp src: Oral BP: (!) 169/79 Pulse: 70   Resp: 16 SpO2: 91 % O2 Device: None (Room air)    Vitals:    02/27/22 1241 02/27/22 2013 02/28/22 0501   Weight: 68 kg (150 lb) 70.1 kg (154 lb 9.6 oz) 69.9 kg (154 lb 1.6 oz)     Vital Signs with Ranges  Temp:  [97.8  F (36.6  C)-98.5  F (36.9  C)] 97.8  F (36.6  C)  Pulse:  [] 70  Resp:  [16-20] 16  BP: (121-230)/() 169/79  SpO2:  [90 %-96 %] 91 %  I/O last 3 completed shifts:  In: 40 [P.O.:40]  Out: 75 [Urine:75]    GEN:  Alert, oriented x 3, comfortable, no overt respiratory distress.  HEENT:  Normocephalic/atraumatic, no scleral icterus, no nasal discharge, mouth moist, masked facies  NECK:  No clear thyromegaly or JVD  CV:  Regular rate and rhythm, sys murmur to ausc.  S1 + S2 noted, no S3 or S4.  LUNGS:  Clear to auscultation ant/lat bilaterally.  No rales/rhonchi/wheezing auscultated bilaterally.  No costal retractions bilaterally.  Symmetric chest rise on inhalation noted.  ABD:  Active bowel sounds, soft, non-tender/non-distended.  No rebound/guarding/rigidity.  No masses palpated.  No obvious HSM to exam.  EXT:  No pretibial edema or cyanosis bilaterally. No joint synovitis noted.  No calf-tenderness or asymmetry noted.  SKIN:  Dry to touch, no rashes or jaundice noted.  PSYCH:calm, not agitated.   NEURO:  No tremors at rest, masked facies, slow speech    Data   Labs:  Recent " Labs   Lab 02/27/22  1316      POTASSIUM 4.1   CHLORIDE 104   CO2 27   ANIONGAP 10   GLC 98   BUN 14   CR 0.61   GFRESTIMATED 89   YADIRA 8.9     Recent Labs   Lab 02/27/22  1316   WBC 5.4   HGB 12.7   HCT 40.3   MCV 98        Recent Labs   Lab 02/27/22  1316      POTASSIUM 4.1   CHLORIDE 104   CO2 27   ANIONGAP 10   GLC 98   BUN 14   CR 0.61   GFRESTIMATED 89   YADIRA 8.9   PHOS 3.2   PROTTOTAL 7.1   ALBUMIN 3.8   BILITOTAL 0.5   ALKPHOS 57   AST 11   ALT <9      Recent Imaging:   Recent Results (from the past 24 hour(s))   CT Abdomen Pelvis w Contrast    Narrative    EXAM: CT ABDOMEN PELVIS W CONTRAST  LOCATION: [facility]  DATE/TIME: 2/27/2022 2:29 PM    INDICATION: Generalized abdominal pain.  COMPARISON: CT abdomen and pelvis 01/04/2016.  TECHNIQUE: CT scan of the abdomen and pelvis was performed following injection of IV contrast. Multiplanar reformats were obtained. Dose reduction techniques were used.  CONTRAST: ISOVUE 370 100 mL    FINDINGS:   LOWER CHEST: Normal.    HEPATOBILIARY: Cholecystectomy. No acute liver abnormality. Mild biliary ectasia but no obstructing etiology can be seen.    PANCREAS: Normal.    SPLEEN: Normal.    ADRENAL GLANDS: Stable left adrenal nodule measuring 2.2 cm series 3 image 38. Normal right adrenal.    KIDNEYS/BLADDER: No hydronephrosis or obstructing stone. No acute renal abnormality. Bladder is obscured by streak artifact without gross abnormality.    BOWEL: Moderate stool. No small bowel obstruction. Appendix not seen. No secondary signs of appendicitis. No acute inflammatory change.    LYMPH NODES: Normal.    VASCULATURE: Aortobiiliac stent appears patent. Significantly decreased aneurysm sac to remote comparison. Aorta measures a transverse size of 2.9 cm.    PELVIC ORGANS: No acute abnormality otherwise seen.    MUSCULOSKELETAL: Diffuse spine degenerative disc disease change with scoliotic curvature right hip arthroplasty. Subacute  healed multiple pelvic fractures.      Impression    IMPRESSION:   1.  No convincing acute abnormality.  2.  Patent and stable appearance of the aortobiiliac stent with significant decrease of the aneurysm sac.  3.  Moderate stool throughout the colon.  4.  Stable left adrenal nodule that is likely an adenoma.   Lumbar spine CT w/o contrast    Narrative    EXAM: CT LUMBAR SPINE W/O CONTRAST  LOCATION: [facility]  DATE/TIME: 2/27/2022 5:37 PM    INDICATION: Compression fracture, L-spine  COMPARISON: 12/23/2020  TECHNIQUE: Routine CT Lumbar Spine without IV contrast. Multiplanar reformats. Dose reduction techniques were used.     FINDINGS:  Chronic compression fractures involving the inferior plate of L3, superior plate of L4 and superior plate of L5, unchanged. No evidence of new acute fracture or subluxation of the lumbar spine by CT imaging. Degenerative endplate change and anterior   marginal osteophytes at L1/L2, L2/L3, L3/L4 and L4/L5. Multilevel degenerative disc disease of the lumbar spine with disc height loss, most pronounced at L4/L5 and L5/S1.    The partially imaged intra-abdominal contents are unremarkable.    T12/L1:  No posterior disc bulge or spinal canal narrowing. No neural foraminal narrowing.    L1/L2:  No posterior disc bulge or spinal canal narrowing. No neural foraminal narrowing.    L2/L3:  No posterior disc bulge or spinal canal narrowing. Moderate bilateral neural foraminal narrowing.    L3/L4:  Symmetric disc bulge and moderate facet arthropathy without spinal canal narrowing. Severe bilateral neural foraminal narrowing.      L4/L5:  Symmetric disc bulge and moderate facet arthropathy without spinal canal narrowing. Moderate to severe left and moderate right neural foraminal narrowing.     L5/S1: Symmetric disc bulge and moderate facet arthropathy without spinal canal narrowing. No neural foraminal narrowing.       Impression    IMPRESSION:    1.  Chronic L3-L5  compression fractures, unchanged from 12/23/2020.  2.  No evidence of new acute fracture or subluxation of the lumbar spine by CT imaging.       Medications       acetaminophen  1,000 mg Oral TID     carbidopa-levodopa  1 tablet Oral BID     enoxaparin ANTICOAGULANT  40 mg Subcutaneous Q24H     gabapentin  300-600 mg Oral BID     gabapentin  600 mg Oral At Bedtime     hydrALAZINE  5 mg Oral TID     hydrochlorothiazide  12.5 mg Oral Daily     levothyroxine  100 mcg Oral Daily     losartan  50 mg Oral BID     polyethylene glycol-propylene glycol  2 drop Both Eyes TID     QUEtiapine  25 mg Oral At Bedtime     senna-docusate  1-2 tablet Oral BID     vitamin D3  50 mcg Oral Daily

## 2022-02-28 NOTE — PLAN OF CARE
"PRIMARY DIAGNOSIS: \"GENERIC\" NURSING  OUTPATIENT/OBSERVATION GOALS TO BE MET BEFORE DISCHARGE:  ADLs back to baseline: No    Activity and level of assistance: Up with maximum assistance. Consider SW and/or PT evaluation.     Pain status: Improved-controlled with oral pain medications.    Return to near baseline physical activity: No     Discharge Planner Nurse   Safe discharge environment identified: No  Barriers to discharge: Yes    Pt had a large BM at bedtime. Does appear to be more comfortable since then. Is sleeping now. Vitals WDL.            Entered by: Job Rodriguez 02/28/2022 12:02 AM     Please review provider order for any additional goals.   Nurse to notify provider when observation goals have been met and patient is ready for discharge.Goal Outcome Evaluation:                          "

## 2022-02-28 NOTE — CONSULTS
Care Management Initial Consult    General Information  Assessment completed with: Patient, Children, Daughter Alysa  Type of CM/SW Visit: Initial Assessment    Primary Care Provider verified and updated as needed:     Readmission within the last 30 days:        Reason for Consult: discharge planning  Advance Care Planning: Advance Care Planning Reviewed: other (see comments) (Present in Epic)     General Information Comments: Lives in memory care    Communication Assessment  Patient's communication style: spoken language (English or Bilingual)    Hearing Difficulty or Deaf: no   Wear Glasses or Blind: yes    Cognitive  Cognitive/Neuro/Behavioral:                        Living Environment:   People in home: facility resident     Current living Arrangements: other (see comments) (Memory Care)  Name of Facility: [facility]   Able to return to prior arrangements: yes  Living Arrangement Comments: Lives in memory care    Family/Social Support:  Care provided by: other (see comments) (Facility staff)  Provides care for: no one, unable/limited ability to care for self  Marital Status:   Children          Description of Support System: Supportive, Involved    Support Assessment: Adequate family and caregiver support    Current Resources:   Patient receiving home care services: No     Community Resources: Financial/Insurance  Equipment currently used at home: walker, rolling  Supplies currently used at home: Incontinence Supplies    Employment/Financial:  Employment Status: retired        Financial Concerns: No concerns identified   Referral to Financial Worker: No       Lifestyle & Psychosocial Needs:  Social Determinants of Health     Tobacco Use: Medium Risk     Smoking Tobacco Use: Former Smoker     Smokeless Tobacco Use: Never Used   Alcohol Use: Not on file   Financial Resource Strain: Not on file   Food Insecurity: Not on file   Transportation Needs: Not on file   Physical Activity: Not on  file   Stress: Not on file   Social Connections: Not on file   Intimate Partner Violence: Not on file   Depression: At risk     PHQ-2 Score: 3   Housing Stability: Not on file       Functional Status:  Prior to admission patient needed assistance:   Dependent ADLs:: Ambulation-walker, Bathing, Dressing, Toileting  Dependent IADLs:: Cleaning, Cooking, Laundry, Shopping, Meal Preparation, Medication Management, Money Management, Transportation  Assesssment of Functional Status: Not at  functional baseline    Mental Health Status:          Chemical Dependency Status:                Values/Beliefs:  Spiritual, Cultural Beliefs, Caodaism Practices, Values that affect care:                 Additional Information:  Alert oriented to self and place patient presents with daughter Alysa from [facility]. Has Lewy Body dementia diagnosis. Seen in ED for abdominal pain and constipation. Patient is assisted with all I/ADLs by facility staff. Uses a roller-walker for ambulation. Explained and discussed MOON/Observation Status with patient and daughter at beside. Has HCD in Epic. Plans to return to her memory care facility with daughter Alysa transporting patient on discharge. Other needs pending clinical progress.    INDIA ORTIZ, RN/CM

## 2022-02-28 NOTE — PLAN OF CARE
PRIMARY DIAGNOSIS: ACUTE PAIN  OUTPATIENT/OBSERVATION GOALS TO BE MET BEFORE DISCHARGE:  1. Pain Status: Improved-controlled with oral pain medications.    2. Return to near baseline physical activity: No    3. Cleared for discharge by consultants (if involved): No    Discharge Planner Nurse   Safe discharge environment identified: No  Barriers to discharge: Yes       Entered by: Job Rodriguez 02/28/2022 5:39 AM   Pt was able to sleep well overnight. No prns needed. Q2 turns, though pt does have a tendency to resist repositioning.       Please review provider order for any additional goals.   Nurse to notify provider when observation goals have been met and patient is ready for discharge.

## 2022-02-28 NOTE — PROGRESS NOTES
Pt is not appropriate for skilled OT services at this time due to patient is dependent in basic ADLs at baseline.  Will defer to OT evaluation and will discontinue current OT orders. Thank you.    2/28/2022 by Arianne Dill OTR/L

## 2022-02-28 NOTE — PROGRESS NOTES
[facility]      OUTPATIENT PHYSICAL THERAPY EVALUATION  PLAN OF TREATMENT FOR OUTPATIENT REHABILITATION  (COMPLETE FOR INITIAL CLAIMS ONLY)  Patient's Last Name, First Name, M.I.  YOB: 1940  Mehreen Camara                        Provider's Name  [facility] Medical Record No.  [id number]                               Onset Date:  02/27/22   Start of Care Date:  (P) 02/28/22      Type:     _X_PT   ___OT   ___SLP Medical Diagnosis:                           PT Diagnosis:  impaired functional mobilitiy   Visits from SOC:  1   _________________________________________________________________________________  Plan of Treatment/Functional Goals    Planned Interventions: gait training, strengthening, patient/family education     Goals: See Physical Therapy Goals on Care Plan in [employer] electronic health record.    Therapy Frequency: One time eval and treatment only  Predicted Duration of Therapy Intervention: 02/28/22  _________________________________________________________________________________    I CERTIFY THE NEED FOR THESE SERVICES FURNISHED UNDER        THIS PLAN OF TREATMENT AND WHILE UNDER MY CARE     (Physician co-signature of this document indicates review and certification of the therapy plan).                Certification date from: (P) 02/28/22, Certification date to: (P) 03/28/22                 Initial Assessment        See Physical Therapy evaluation dated (P) 02/28/22 in Epic electronic health record.

## 2022-02-28 NOTE — PLAN OF CARE
Goal Outcome Evaluation:  PRIMARY DIAGNOSIS: ACUTE PAIN  OUTPATIENT/OBSERVATION GOALS TO BE MET BEFORE DISCHARGE:  1. Pain Status: Improved-controlled with oral pain medications.    2. Return to near baseline physical activity: No    3. Cleared for discharge by consultants (if involved): No    Discharge Planner Nurse   Safe discharge environment identified: No  Barriers to discharge: Yes    Pt will cry out for help. Pt daughter stated that she will have night terrors. Pt was consolable though not completely reoriented. Orientation to self and intermittently to situation.              Entered by: Job Rodriguez 02/27/2022 10:55 PM     Please review provider order for any additional goals.   Nurse to notify provider when observation goals have been met and patient is ready for discharge.

## 2022-02-28 NOTE — H&P
[facility] Medicine Service History and Physical  [facility]: [facility]    Mehreen Camara is a 82 year old female who  has a past medical history of AAA (abdominal aortic aneurysm) (H), Abdominal pain, epigastric (12/16/2013), Abnormal computed tomography scan (9/15/2016), Abnormal finding on imaging (9/15/2016), Acute encephalopathy (9/26/2015), Adjustment disorder with anxious mood (9/17/2018), Adjustment disorder with mixed anxiety and depressed mood (9/17/2018), Adrenal adenoma, Adrenal adenoma, Agitation, Anemia (11/18/2010), Aneurysm of abdominal vessel (H) (2/25/2008), Anxiety state (2/26/2008), Arthritis, Asymptomatic postmenopausal status, Back pain (5/16/2020), Harris esophagus, Harris's esophagus (1/11/2012), Benign neoplasm of ascending colon (9/19/2016), Bilateral knee pain (8/5/2020), Bloating (5/5/2017), Bradycardia (9/17/2018), Cataract (9/17/2018), Chest wall pain (1/20/2013), Chronic low back pain (9/29/2016), Cognitive and behavioral changes, Compression fracture of lumbar vertebra (H) (10/10/2020), Constipation, Contact dermatitis and eczema (11/22/2006), DDD (degenerative disc disease), lumbosacral (2/26/2008), Depression, Depression, major (7/13/2009), Depressive disorder, Diaphragmatic hernia (9/18/2018), Dietary counseling and surveillance (9/15/2016), Disorder of bone and cartilage (9/17/2018), Diverticulosis of colon (12/18/2014), Dizziness and giddiness, Dysphagia (12/17/2013), Early satiety (12/16/2013), Esophageal reflux, Essential hypertension (11/22/2006), Fall (8/5/2020), Flatulence, eructation and gas pain (2/19/2014), Gaseous abdominal distention (9/19/2016), Gastro-esophageal reflux disease with esophagitis (6/6/2017), Generalized muscle weakness (7/4/2018), GERD (gastroesophageal reflux disease), Hemorrhage of rectum and anus (9/19/2016), Hiatal hernia, HLD (hyperlipidemia), Hoarseness of voice (4/6/2010), HTN (hypertension), Hypertension, Hypokalemia  (6/29/2007), Hypothyroid, Hypothyroidism (9/22/2009), Insomnia due to anxiety and fear (9/17/2018), Insomnia due to mental condition, Irritable bowel syndrome (11/22/2006), Lower back pain, Major depressive disorder, recurrent episode (H) (9/17/2018), Major depressive disorder, single episode, moderate (H), Malaise and fatigue (7/10/2007), Metabolic encephalopathy, Migraine with aura, Mixed hyperlipidemia (2/10/2008), Mood disorder due to a general medical condition, Movement disorder (9/19/2018), Multiple system atrophy P (H), 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), Murmur, Nonrheumatic aortic valve stenosis (10/23/2020), Nontoxic multinodular goiter (11/22/2006), Obesity, unspecified, Orthostatic hypotension dysautonomic syndrome (9/22/2017), Osteoarthritis, Partial small bowel obstruction (H), Polyp of colon (9/19/2016), Post-op pain (12/12/2018), Postoperative hypothyroidism (1/15/2013), Prediabetes (10/26/2010), Primary insomnia (7/4/2017), REM sleep behavior disorder (8/5/2020), Retinal migraine (4/28/2014), Rheumatic fever, Rupture of left Achilles tendon, sequela (12/31/2019), S/P AAA repair using bifurcation graft (11/30/2015), S/P laparoscopic cholecystectomy (12/12/2018), Scoliosis, Sleep difficulties, Small bowel obstruction (H), Thyroid disease, Thyroid nodule, Tinnitus, Undiagnosed cardiac murmurs (11/22/2006), Vitamin D deficiency (1/15/2013), Weak (4/29/2018), Weakness (5/4/2017), and Weakness of both lower extremities (7/5/2018).   Chief Complaint: RLQ pain    Assessment and Plan  Slow transit constipation  Abdominal pain  Magnesium citrate  Hold opiates  Encourage PO hydration  Miralax  If no progress, consult GI  Check TSH  Schedule tylenol    Lewy body dementia without behavior disturbance  Multisystem atrophy  Hasn't seen neuro in 1 year  PTA Sinemet, Seroquel    Generalized weakness  Check phos, PT, OT  Her  strength is abysmal, concerned for her prognosis    Adrenal  "incidentaloma  Defer secretory work-up until patient improved    Essential hypertension  PTA meds with hold criteria    DVTP: lovenox  Code Status: Prior DNR  Disposition: Observation   Estimated body mass index is 24.21 kg/m  as calculated from the following:    Height as of 4/8/21: 1.676 m (5' 6\").    Weight as of this encounter: 68 kg (150 lb).    History of Present Illness  Mehreen Camara presents with 1 week of abdominal pain. Says it feels \"real tight\". She hasn't been able to sleep every night due to abd pain. Daughter says pt has been nauseated for 2 weeks. Last BM was yesterday it was soft and \"looked like pudding\", nonmelena appearing. She does has some improvement in her symptoms briefly with BM's. She is established with GI for c-scopes and other issues, but says this \"pain is different\" than what she has been seen for prior.  She lives in memory care and a has an unfortunate diagnosis of multisystem atrophy.  She has fell out of follow-up with her neurologist after neurology moved to a different city.  Unclear when she last had adjustments for her Synthroid or Sinemet.  Daughter present during exam and confirms DNR.  ROS + weakness, abd pain, nausea, constipation, fatigue  ROS - vomiting, CP, SOB, fevers, chills  All other systems reviewed and are negative  In the ED, patient was initially quite hypertensive which improved with management, afebrile.  Stable on room air.  Normal lactic acid, LFTs, BMP, CBC.  CT abdomen negative for acute finding, patent aorto iliac stent, decreased aneurysmal sac, moderate stool in the colon, left adrenal nodule.  Received morphine, Zofran, fluid bolus, Dilaudid, enema without results.    Appears nad, frail, fatigued  Anicteric conjunctiva, PERRL, glasses  Semi-dry mucous membranes, intact, poor dentition  Trachea midline, no jvd  cta, Respiratory effort normal  RRR, murmur systolic  Abdomen soft, nondistended, fairly non-tender  no edema, clubbing of nails " absent  Skin normal temperature, dry  Remarkably weak  strength assessment  flat affect, alert  Vital signs reviewed by me    Wt Readings from Last 4 Encounters:   02/27/22 68 kg (150 lb)   04/08/21 73.2 kg (161 lb 6.4 oz)   03/18/21 73.1 kg (161 lb 3.2 oz)   03/14/21 72.8 kg (160 lb 6.4 oz)        reports that she quit smoking about 27 years ago. Her smoking use included cigarettes. She smoked 0.50 packs per day. She has never used smokeless tobacco. She reports that she does not drink alcohol and does not use drugs.  family history includes Cancer (age of onset: 57.00) in her brother; Coronary Artery Disease in her father and mother; Heart Disease in her father, mother, paternal aunt, and paternal uncle; Hypertension in her daughter, daughter, and mother; Lupus in her daughter; Neuropathy in her daughter; Other Cancer in her brother; Parkinsonism in her paternal uncle and another family member.   has a past surgical history that includes orthopedic surgery; Thyroidectomy (2011); joint replacement (Right, 2003); Hysterectomy (2013); carpal tunnel release rt/lt (Bilateral, 2013); Spine surgery (1981); aaa repair (2015); Laparoscopic cholecystectomy (N/A, 12/12/2018); IR Abdominal Endovascular Stent Graft (11/30/2015); Pr Thyroidectomy; TOTAL HIP ARTHROPLASTY (Right); TOTAL ABDOM HYSTERECTOMY (1985); REVISE MEDIAN N/CARPAL TUNNEL SURG; joint replacement; CERV SPINE FUSN,ANTER,BELOW C2; Xr Major Joint Or Bursa Inj/Asp Unilateral (5/18/2020); and Xr Major Joint Or Bursa Inj/Asp Bilateral (5/18/2020).   Allergies   Allergen Reactions     Penicillins Anaphylaxis, Rash and Shortness Of Breath     Aspirin Nausea and GI Disturbance     Atenolol Cough     Atorvastatin Muscle Pain (Myalgia) and Nausea and Vomiting     Bupropion Nausea     Cephalexin Rash     Localized to neck area     Clindamycin Other (See Comments)     Constipation      Codeine Nausea     Ibuprofen Nausea and GI Disturbance     Lovastatin Muscle Pain  (Myalgia)       Xavier Yates MD, MPH  [facility]  [facility]   Phone: #[phone]

## 2022-02-28 NOTE — PLAN OF CARE
Problem: Plan of Care - These are the overarching goals to be used throughout the patient stay.    Goal: Readiness for Transition of Care  Outcome: Adequate for Care Transition  Patient has returned to baseline mobility and cognition.      Problem: Plan of Care - These are the overarching goals to be used throughout the patient stay.    Goal: Optimal Comfort and Wellbeing  Outcome: Adequate for Care Transition   Pain controlled with scheduled medications, no further action needed for pain control.

## 2022-02-28 NOTE — PROGRESS NOTES
Care Management Discharge Note    Discharge Date: 02/28/2022       Discharge Disposition:  [facility]    Discharge Services: None    Discharge DME:  none    Discharge Transportation: family or friend will provide    Education Provided on the Discharge Plan:  CM met with patient. AVS per bedside RN.    Persons Notified of Discharge Plans: patient and daughter (Abiodun)    Patient/Family in Agreement with the Plan:  yes    Handoff Referral Completed: Yes    Additional Information:  Chart reviewed. CM met with patient. Patient will be returning to  [facility]. CM updated  [facility]. CM updated daughter (Alysa). Dalton Watt will transport back to  [facility] at time of hospital discharge.               Evette Perez, RN          MICHELLE met with patient. Patient requests CM to call daughter Alysa.  Per patient daughter will transport back to [place]. CM called daughter to confirm. CM called [facility]. [facility] took CM phone # and will call back. CM left VM for daughter Alysa to return call to . 9:48 AM     CM spoke with [facility]. The nurse was unsure if they could take patient back today. [facility] nurse will talk with D.O.N. and call CM back.  Bedside RN reports that patient is assist of 1 and needs direction. CM paged MD to ask if PT eval is needed.

## 2022-02-28 NOTE — DISCHARGE SUMMARY
[facility]    Discharge Summary  Hospitalist    Date of Admission:  2/27/2022  Date of Discharge:  2/28/2022  Provider:  Mayra Donovan DO    Discharge Diagnoses   1.  Slow transient constipation  2.  Abd pain, d/t #1 - resolved  3.  Left adrenal nodule  4.  Lewy body dementia    Other medical issues:  Past Medical History:   Diagnosis Date     AAA (abdominal aortic aneurysm) (H)      Abdominal pain, epigastric 12/16/2013     Abnormal computed tomography scan 9/15/2016     Abnormal finding on imaging 9/15/2016     Acute encephalopathy 9/26/2015     Adjustment disorder with anxious mood 9/17/2018     Adjustment disorder with mixed anxiety and depressed mood 9/17/2018     Adrenal adenoma     stable, thought not to be hormonally active     Adrenal adenoma      Agitation      Anemia 11/18/2010     Aneurysm of abdominal vessel (H) 2/25/2008    Created by [employer] [facility] Annotation: Jun 8 2011  8:07AM Danni Grigsby: 4.4 on 11/2013.   Needs 6-12 month u/s or CT.  Replacement Utility updated for latest IMO load  Overview:  2/08 Abd US: 5.4 cm. 3/08 CT Abd: 2.7 x 3.3. 9/08:  MRI Abd: 2.7X3.2  Next 9/09.     Anxiety state 2/26/2008     Arthritis      Asymptomatic postmenopausal status     Created by Conversion  Replacement Utility updated for latest IMO load     Back pain 5/16/2020     Harris esophagus      Harris's esophagus 1/11/2012    Created by [facility] Annotation: Feb  3 2012  8:32Cameron Tejada: Needs EGD 2/2017   Overview:  Overview:  Created by [facility] Annotation: Feb  3 2012  8:32Cameron Tejada: Needs EGD 2/2017     Benign neoplasm of ascending colon 9/19/2016     Bilateral knee pain 8/5/2020     Bloating 5/5/2017     Bradycardia 9/17/2018     Cataract 9/17/2018    Created by Conversion   Overview:  Overview:  Created by Conversion     Chest wall pain 1/20/2013     Chronic low back pain 9/29/2016     Cognitive and behavioral  changes      Compression fracture of lumbar vertebra (H) 10/10/2020     Constipation      Contact dermatitis and eczema 11/22/2006     DDD (degenerative disc disease), lumbosacral 2/26/2008     Depression      Depression, major 7/13/2009     Depressive disorder      Diaphragmatic hernia 9/18/2018    Created by Conversion  Replacement Utility updated for latest IMO load  Overview:  Overview:  Created by Conversion  Replacement Utility updated for latest IMO load     Dietary counseling and surveillance 9/15/2016     Disorder of bone and cartilage 9/17/2018    Created by [facility] Annotation: Dec 13 2012  7:Roberta Padron: vit D/Ca, f/u  Dexa needed 1/2014.  Replacement Utility updated for latest IMO load  Overview:  Overview:  Created by [employer] [facility] Annotation: Dec 13 2012  7:Roberta Padron: vit D/Ca, f/u  Dexa needed 1/2014.  Replacement Utility updated for latest IMO load     Diverticulosis of colon 12/18/2014     Dizziness and giddiness     Created by Conversion      Dysphagia 12/17/2013     Early satiety 12/16/2013     Esophageal reflux     Created by Conversion      Essential hypertension 11/22/2006    Created by Conversion  Replacement Utility updated for latest IMO load  Overview:  Overview:  Created by Conversion  Replacement Utility updated for latest IMO load     Fall 8/5/2020     Flatulence, eructation and gas pain 2/19/2014     Gaseous abdominal distention 9/19/2016     Gastro-esophageal reflux disease with esophagitis 6/6/2017     Generalized muscle weakness 7/4/2018     GERD (gastroesophageal reflux disease)      Hemorrhage of rectum and anus 9/19/2016     Hiatal hernia      HLD (hyperlipidemia)      Hoarseness of voice 4/6/2010     HTN (hypertension)      Hypertension      Hypokalemia 6/29/2007     Hypothyroid      Hypothyroidism 9/22/2009    Created by Conversion  Replacement Utility updated for latest IMO load  Overview:  Overview:  Created by Conversion   Replacement Utility updated for latest IMO load     Insomnia due to anxiety and fear 9/17/2018     Insomnia due to mental condition      Irritable bowel syndrome 11/22/2006     Lower back pain      Major depressive disorder, recurrent episode (H) 9/17/2018    Created by Conversion  Replacement Utility updated for latest IMO load  Overview:  Overview:  Created by Conversion  Replacement Utility updated for latest IMO load     Major depressive disorder, single episode, moderate (H)      Malaise and fatigue 7/10/2007     Metabolic encephalopathy      Migraine with aura      Mixed hyperlipidemia 2/10/2008    Created by Conversion      Mood disorder due to a general medical condition      Movement disorder 9/19/2018     Multiple system atrophy P (H)      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     Murmur      Nonrheumatic aortic valve stenosis 10/23/2020     Nontoxic multinodular goiter 11/22/2006    Overview:  thyroidectomy 7/07 for atypical cells on FNA- benign cysts seen at time of surgical pathology     Obesity, unspecified     Created by Conversion      Orthostatic hypotension dysautonomic syndrome 9/22/2017     Osteoarthritis      Partial small bowel obstruction (H)      Polyp of colon 9/19/2016     Post-op pain 12/12/2018     Postoperative hypothyroidism 1/15/2013    Overview:  7/2/07-  Thyroidectomy by Dr Wang Crenshaw     Prediabetes 10/26/2010     Primary insomnia 7/4/2017     REM sleep behavior disorder 8/5/2020     Retinal migraine 4/28/2014     Rheumatic fever     thought to have had as a child     Rupture of left Achilles tendon, sequela 12/31/2019     S/P AAA repair using bifurcation graft 11/30/2015     S/P laparoscopic cholecystectomy 12/12/2018     Scoliosis      Sleep difficulties      Small bowel obstruction (H)      Thyroid disease      Thyroid nodule     removed nodules and thyroid     Tinnitus     Created by Conversion  Replacement Utility updated for latest IMO load     Undiagnosed cardiac murmurs  11/22/2006     Vitamin D deficiency 1/15/2013     Weak 4/29/2018     Weakness 5/4/2017     Weakness of both lower extremities 7/5/2018       History of Present Illness   Mehreen Camara is an 82 year old female who presented with abd pain and constipation  Please see the admission history and physical for full details.    Hospital Course   Mehreen Camara was admitted on 2/27/2022.  The following problems were addressed during her hospitalization:    1.  Slow transient constipation        Abdominal pain    Ms. Camara is a 83 yo female who presents to the ED from [facility] with abd pain.  CT imaging with moderate stool throughout the colon.  She was given enema without results in the ED and admitted overnight for additional treatment for her constipation.  She was given a dose of mag citrate, had a large BM and felt much better on am of day of discharge.      She was tolerating an advanced diet prior to discharge.  She should start taking a miralax dose daily in addition to her scheduled senna.  The miralax dosing can be adjusted for desired stool consistency.      2.  Lewy body dementia  No changes were made in her PTA medications.  She should f/up with Neuro in the next month for further medication adjustments/recommendations.    3.  Essential HTN    No changes were made in her PTA meds    4.  Chronic pain syndrome  F/up with her PCP and pain team as scheduled.  No medication changes made at time of discharge.       5. Adrenal nodule, left  This was noted on CT imaging and compared to CT from 2016.  The adrenal nodule measured 2.2cm and appeared stable on imaging, likely an adenoma.    Significant Results and Procedures   none    Pending Results   Unresulted Labs Ordered in the Past 30 Days of this Admission     No orders found for last 31 day(s).        Code Status   DNR / DNI       Primary Care Physician   Dede Zamorano    Physical Exam   Temp: 97.8  F (36.6  C) Temp src: Oral BP: 105/57  "Pulse: 62   Resp: 16 SpO2: 93 % O2 Device: None (Room air)    Vitals:    02/27/22 1241 02/27/22 2013 02/28/22 0501   Weight: 68 kg (150 lb) 70.1 kg (154 lb 9.6 oz) 69.9 kg (154 lb 1.6 oz)     Vital Signs with Ranges  Temp:  [97.8  F (36.6  C)-98.4  F (36.9  C)] 97.8  F (36.6  C)  Pulse:  [] 62  Resp:  [16-20] 16  BP: (105-230)/() 105/57  SpO2:  [90 %-96 %] 93 %  I/O last 3 completed shifts:  In: 40 [P.O.:40]  Out: 75 [Urine:75]    PT SEEN AND EXAMINED ON DAY OF D/C  GEN:  Awake, very slow and limited speech, appears to be in NAD  HEENT:  Normocephalic/atraumatic, PERRL, no scleral icterus, no nasal discharge, membranes appear fairly moist  NECK:  No clear thyromegaly ofrclear JVD  CV:  Somewhat distant but regular rate and rhythm, no murmur to ausc.  S1 + S2 noted, no S3 or S4.  LUNGS:  Clear to auscultation ant/lat bilaterally.  No rales/rhonchi/wheezing auscultated bilaterally.  No costal retractions bilaterally.  Symmetric chest rise on inhalation noted.  ABD:  Quite active bowel sounds, soft, non-tender to palpation throughout, not distended.  No rebound/guarding/rigidity.  No masses palpated.  No obvious HSM to exam.  EXT:  No significant pretibial edema or cyanosis bilaterally. No joint synovitis noted.  No calf-tenderness or asymmetry noted.  SKIN:  Dry to touch, no rashes or jaundice noted.  PSYCH: somewhat flattened affect vs. Masked facies.  Not agitated, calm  NEURO:  No significant tremors at rest, speech is minimal but appears to be able to understand basic questions and able to shake head \"yes\" and \"no\".    Discharge Disposition   Discharged to home    Consultations This Hospital Stay   SOCIAL WORK IP CONSULT  PHYSICAL THERAPY ADULT IP CONSULT  OCCUPATIONAL THERAPY ADULT IP CONSULT  PHYSICAL THERAPY ADULT IP CONSULT    Time Spent on this Encounter   I, Mayra L. [place], DO, personally saw the patient today and spent less than or equal to 30 minutes discharging this " patient.    Discharge Orders      Reason for your hospital stay    You were admitted for abd pain and constipation     Follow-up and recommended labs and tests     F/up with PCP as previously scheduled     Activity    Your activity upon discharge: activity as tolerated     Diet    Follow this diet upon discharge: resume home diet     Discharge Medications   Current Discharge Medication List      START taking these medications    Details   !! polyethylene glycol (MIRALAX) 17 GM/Dose powder Take 17 g by mouth daily  Qty: 510 g    Comments: Hold for loose stools  Associated Diagnoses: Irritable bowel syndrome with constipation       !! - Potential duplicate medications found. Please discuss with provider.      CONTINUE these medications which have NOT CHANGED    Details   acetaminophen (TYLENOL) 500 MG tablet Take 2 tablets (1,000 mg) by mouth 3 times daily    Associated Diagnoses: Multiple closed fractures of pelvis without disruption of pelvic ring, initial encounter (H)      BELBUCA 75 MCG FILM buccal film Place 75 mcg inside cheek 2 times daily       bisacodyl (DULCOLAX) 10 MG suppository Place 10 mg rectally daily as needed for constipation      carbidopa-levodopa (SINEMET)  MG tablet Take 1 tablet by mouth 2 times daily      diclofenac (VOLTAREN) 1 % topical gel Apply 2 g topically 2 times daily as needed for moderate pain      DULoxetine (CYMBALTA) 60 MG capsule Take 60 mg by mouth daily      !! gabapentin (NEURONTIN) 300 MG capsule Take 600 mg by mouth At Bedtime      !! gabapentin (NEURONTIN) 300 MG capsule Take 300-600 mg by mouth 2 times daily       hydrALAZINE (APRESOLINE) 10 MG tablet Take 5 mg by mouth 3 times daily      hydrochlorothiazide (MICROZIDE) 12.5 MG capsule Take 12.5 mg by mouth daily      lactobacillus rhamnosus, GG, (CULTURELL KIDS) packet Take 1 packet by mouth daily      levothyroxine (SYNTHROID/LEVOTHROID) 100 MCG tablet Take 1 tablet (100 mcg) by mouth daily  Qty:      Associated  Diagnoses: Hypothyroidism, unspecified type      losartan (COZAAR) 50 MG tablet Take 50 mg by mouth 2 times daily      !! oxyCODONE (ROXICODONE) 5 MG tablet Take 5 mg by mouth daily at 2 pm      !! oxyCODONE (ROXICODONE) 5 MG tablet Take 1 tablet (5 mg) by mouth every 4 hours as needed for severe pain  Qty: 30 tablet, Refills: 0    Associated Diagnoses: Multiple closed fractures of pelvis without disruption of pelvic ring with routine healing, subsequent encounter      !! polyethylene glycol (MIRALAX) 17 GM/Dose powder Take 17 g by mouth 2 times daily as needed for constipation  Qty: 510 g    Associated Diagnoses: Irritable bowel syndrome with constipation      polyethylene glycol-propylene glycol (SYSTANE ULTRA) 0.4-0.3 % SOLN ophthalmic solution Place 2 drops into both eyes 3 times daily (and additionally as needed/requested)      !! QUEtiapine (SEROQUEL) 25 MG tablet Take 1 tablet (25 mg) by mouth At Bedtime  Qty:      Associated Diagnoses: Insomnia due to anxiety and fear      !! QUEtiapine (SEROQUEL) 25 MG tablet Take 0.5 tablets (12.5 mg) by mouth 3 times daily as needed (agitation)  Qty:      Associated Diagnoses: Insomnia due to anxiety and fear      senna-docusate (SENOKOT-S/PERICOLACE) 8.6-50 MG tablet Take 1-2 tablets by mouth 2 times daily  Qty:      Associated Diagnoses: Multiple closed fractures of pelvis without disruption of pelvic ring, initial encounter (H)      simethicone (MYLICON) 125 MG chewable tablet Take 125 mg by mouth 3 times daily as needed      vitamin D3 (CHOLECALCIFEROL) 50 mcg (2000 units) tablet Take 1 tablet (50 mcg) by mouth daily  Qty:      Associated Diagnoses: Multiple closed fractures of pelvis without disruption of pelvic ring, initial encounter (H)       !! - Potential duplicate medications found. Please discuss with provider.        Allergies   Allergies   Allergen Reactions     Penicillins Anaphylaxis, Rash and Shortness Of Breath     Aspirin Nausea and GI Disturbance      Atenolol Cough     Atorvastatin Muscle Pain (Myalgia) and Nausea and Vomiting     Bupropion Nausea     Cephalexin Rash     Localized to neck area     Clindamycin Other (See Comments)     Constipation      Codeine Nausea     Ibuprofen Nausea and GI Disturbance     Lovastatin Muscle Pain (Myalgia)     Data   Recent Labs   Lab 02/27/22  1316   WBC 5.4   HGB 12.7   HCT 40.3   MCV 98        Recent Labs   Lab 02/27/22  1316      POTASSIUM 4.1   CHLORIDE 104   CO2 27   ANIONGAP 10   GLC 98   BUN 14   CR 0.61   GFRESTIMATED 89   YADIRA 8.9     No results for input(s): CULT in the last 168 hours.  Recent Labs   Lab 02/27/22  1316      POTASSIUM 4.1   CHLORIDE 104   CO2 27   ANIONGAP 10   GLC 98   BUN 14   CR 0.61   GFRESTIMATED 89   YADIRA 8.9   PHOS 3.2   PROTTOTAL 7.1   ALBUMIN 3.8   BILITOTAL 0.5   ALKPHOS 57   AST 11   ALT <9     Results for orders placed or performed during the hospital encounter of 02/27/22   CT Abdomen Pelvis w Contrast    Narrative    EXAM: CT ABDOMEN PELVIS W CONTRAST  LOCATION: [facility]  DATE/TIME: 2/27/2022 2:29 PM    INDICATION: Generalized abdominal pain.  COMPARISON: CT abdomen and pelvis 01/04/2016.  TECHNIQUE: CT scan of the abdomen and pelvis was performed following injection of IV contrast. Multiplanar reformats were obtained. Dose reduction techniques were used.  CONTRAST: ISOVUE 370 100 mL    FINDINGS:   LOWER CHEST: Normal.    HEPATOBILIARY: Cholecystectomy. No acute liver abnormality. Mild biliary ectasia but no obstructing etiology can be seen.    PANCREAS: Normal.    SPLEEN: Normal.    ADRENAL GLANDS: Stable left adrenal nodule measuring 2.2 cm series 3 image 38. Normal right adrenal.    KIDNEYS/BLADDER: No hydronephrosis or obstructing stone. No acute renal abnormality. Bladder is obscured by streak artifact without gross abnormality.    BOWEL: Moderate stool. No small bowel obstruction. Appendix not seen. No secondary signs of appendicitis.  No acute inflammatory change.    LYMPH NODES: Normal.    VASCULATURE: Aortobiiliac stent appears patent. Significantly decreased aneurysm sac to remote comparison. Aorta measures a transverse size of 2.9 cm.    PELVIC ORGANS: No acute abnormality otherwise seen.    MUSCULOSKELETAL: Diffuse spine degenerative disc disease change with scoliotic curvature right hip arthroplasty. Subacute healed multiple pelvic fractures.      Impression    IMPRESSION:   1.  No convincing acute abnormality.  2.  Patent and stable appearance of the aortobiiliac stent with significant decrease of the aneurysm sac.  3.  Moderate stool throughout the colon.  4.  Stable left adrenal nodule that is likely an adenoma.   Lumbar spine CT w/o contrast    Narrative    EXAM: CT LUMBAR SPINE W/O CONTRAST  LOCATION: [facility]  DATE/TIME: 2/27/2022 5:37 PM    INDICATION: Compression fracture, L-spine  COMPARISON: 12/23/2020  TECHNIQUE: Routine CT Lumbar Spine without IV contrast. Multiplanar reformats. Dose reduction techniques were used.     FINDINGS:  Chronic compression fractures involving the inferior plate of L3, superior plate of L4 and superior plate of L5, unchanged. No evidence of new acute fracture or subluxation of the lumbar spine by CT imaging. Degenerative endplate change and anterior   marginal osteophytes at L1/L2, L2/L3, L3/L4 and L4/L5. Multilevel degenerative disc disease of the lumbar spine with disc height loss, most pronounced at L4/L5 and L5/S1.    The partially imaged intra-abdominal contents are unremarkable.    T12/L1:  No posterior disc bulge or spinal canal narrowing. No neural foraminal narrowing.    L1/L2:  No posterior disc bulge or spinal canal narrowing. No neural foraminal narrowing.    L2/L3:  No posterior disc bulge or spinal canal narrowing. Moderate bilateral neural foraminal narrowing.    L3/L4:  Symmetric disc bulge and moderate facet arthropathy without spinal canal narrowing. Severe  bilateral neural foraminal narrowing.      L4/L5:  Symmetric disc bulge and moderate facet arthropathy without spinal canal narrowing. Moderate to severe left and moderate right neural foraminal narrowing.     L5/S1: Symmetric disc bulge and moderate facet arthropathy without spinal canal narrowing. No neural foraminal narrowing.       Impression    IMPRESSION:    1.  Chronic L3-L5 compression fractures, unchanged from 12/23/2020.  2.  No evidence of new acute fracture or subluxation of the lumbar spine by CT imaging.     *Note: Due to a large number of results and/or encounters for the requested time period, some results have not been displayed. A complete set of results can be found in Results Review.

## 2022-02-28 NOTE — PROGRESS NOTES
02/28/22 0927   Quick Adds   Type of Visit Initial PT Evaluation   Living Environment   People in Home alone   Current Living Arrangements other (see comments)  (memory care)   Transportation Anticipated family or friend will provide   Living Environment Comments per care manager note has assist with all ADL's.   Patient states she has to wait too long sometimes for them to help her go to bathroom   Self-Care   Current Activity Tolerance fair   Equipment Currently Used at Home walker, rolling   Fall history within last six months yes   Number of times patient has fallen within last six months 2   General Information   Onset of Illness/Injury or Date of Surgery 02/27/22   Patient/Family Therapy Goals Statement (PT) none stated   Pertinent History of Current Problem (include personal factors and/or comorbidities that impact the POC) constipation, abdominal pain   Existing Precautions/Restrictions no known precautions/restrictions   Cognition   Affect/Mental Status (Cognition) confused   Orientation Status (Cognition) oriented to;person;place;time   Posture    Posture Not impaired   Range of Motion (ROM)   Range of Motion ROM is WFL   Strength (Manual Muscle Testing)   Strength Comments general weakness   Transfers   Transfers sit-stand transfer   Sit-Stand Transfer   Sit-Stand [place] (Transfers) moderate assist (50% patient effort);minimum assist (75% patient effort);contact guard   Assistive Device (Sit-Stand Transfers) walker, 4-wheeled   Comment, (Sit-Stand Transfer) patient abilitiy  to stand fluctuating.  Stood 2x with min-mod assist, 1 x with CGA .   Gait/Stairs (Locomotion)   [place] Level (Gait) contact guard   Assistive Device (Gait) walker, 4-wheeled   Distance in Feet (Required for LE Total Joints) 125, 30   Pattern (Gait) step-through   Deviations/Abnormal Patterns (Gait) gait speed decreased;festinating/shuffling   Maintains Weight-bearing Status (Gait) able to maintain   Balance   Balance  Comments decreased balance, uses walker, decreased heel strike   Sensory Examination   Sensory Perception patient reports no sensory changes   Coordination   Coordination no deficits were identified   Muscle Tone   Muscle Tone no deficits were identified   Clinical Impression   Criteria for Skilled Therapeutic Intervention Yes, treatment indicated   PT Diagnosis (PT) impaired functional mobilitiy   Influenced by the following impairments weakness, pain   Functional limitations due to impairments transfers, gait   Clinical Presentation (PT Evaluation Complexity) Stable/Uncomplicated   Clinical Presentation Rationale Pt. presents as medically diagnosed   Clinical Decision Making (Complexity) moderate complexity   Planned Therapy Interventions (PT) gait training;strengthening;patient/family education   Anticipated Equipment Needs at Discharge (PT) walker, rolling   Risk & Benefits of therapy have been explained evaluation/treatment results reviewed;patient   PT Discharge Planning   PT Discharge Recommendation (DC Rec) Long term care facility  (memory care)   PT Rationale for DC Rec Per care manager notes, patient gets assist with all mobility. Patient able to stand with assist of one and ambulate with contact guard.  Patient safety to ambulate alone is limited due to her cognition.  Needs to continue ambulating with nursing staff.  No further skilled PT needs   PT Brief overview of current status Patient at baseline for mobilitiy   Total Evaluation Time   Total Evaluation Time (Minutes) 15   Physical Therapy Goals   PT Frequency One time eval and treatment only   PT Predicated Duration/Target Date for Goal Attainment 02/28/22   PT Goals Gait;Transfers   PT: Transfers Moderate assist;Minimal assist;Goal Met   PT: Gait 100 feet;Minimal assist;Goal Met

## 2022-02-28 NOTE — PROGRESS NOTES
"PRIMARY DIAGNOSIS: \"GENERIC\" NURSING  OUTPATIENT/OBSERVATION GOALS TO BE MET BEFORE DISCHARGE:  1. ADLs back to baseline: Yes    2. Activity and level of assistance: Up with standby assistance.    3. Pain status: Improved-controlled with oral pain medications.    4. Return to near baseline physical activity: Yes     Discharge Planner Nurse   Safe discharge environment identified: Yes  Barriers to discharge: No       Entered by: Thalia Cadena 02/28/2022 11:32 AM   Tolerating full liwuid diet, ambulated with PT, appears to be back to baseline.  Daughter will transport back to care facility this afternoon.    Please review provider order for any additional goals.   Nurse to notify provider when observation goals have been met and patient is ready for discharge.  "

## 2022-02-28 NOTE — PROGRESS NOTES
Physical Therapy Discharge Summary    Reason for therapy discharge:    Discharged to Memory care    Progress towards therapy goal(s). See goals on Care Plan in [facility] electronic health record for goal details.  Goals met    Therapy recommendation(s):    No further therapy is recommended. Patient at baseline and lives in memory care,  Has assist for all adl's per care manager notes.  Able to ambulate, transfers with assist of one staff.

## 2022-03-01 ENCOUNTER — PATIENT OUTREACH (OUTPATIENT)
Dept: CARE COORDINATION | Facility: CLINIC | Age: 82
End: 2022-03-01
Payer: COMMERCIAL

## 2022-03-01 DIAGNOSIS — Z71.89 OTHER SPECIFIED COUNSELING: ICD-10-CM

## 2022-03-01 NOTE — PROGRESS NOTES
Clinic Care Coordination Contact    Background: Care Coordination referral placed from hospitals discharge report for reason of patient meeting criteria for a TCM outreach call by [facility] [place] team.    Assessment: Upon chart review, [facility] Team member will cancel/close the referral for TCM outreach due to reason below:    Patient has discharged to a Group home, Memory Care or Nursing Home    Plan: Care Coordination referral for TCM outreach canceled.    Yumiko Jon  Community Health Worker  [facility]  Ph: [phone]

## 2022-05-16 ENCOUNTER — APPOINTMENT (OUTPATIENT)
Dept: CT IMAGING | Facility: CLINIC | Age: 82
DRG: 551 | End: 2022-05-16
Attending: EMERGENCY MEDICINE
Payer: COMMERCIAL

## 2022-05-16 ENCOUNTER — HOSPITAL ENCOUNTER (INPATIENT)
Facility: CLINIC | Age: 82
LOS: 9 days | Discharge: HOSPICE | DRG: 551 | End: 2022-05-25
Attending: EMERGENCY MEDICINE | Admitting: HOSPITALIST
Payer: COMMERCIAL

## 2022-05-16 ENCOUNTER — APPOINTMENT (OUTPATIENT)
Dept: RADIOLOGY | Facility: CLINIC | Age: 82
DRG: 551 | End: 2022-05-16
Attending: EMERGENCY MEDICINE
Payer: COMMERCIAL

## 2022-05-16 DIAGNOSIS — G89.29 CHRONIC BILATERAL LOW BACK PAIN WITH BILATERAL SCIATICA: ICD-10-CM

## 2022-05-16 DIAGNOSIS — S32.82XA MULTIPLE CLOSED FRACTURES OF PELVIS WITHOUT DISRUPTION OF PELVIC RING, INITIAL ENCOUNTER (H): ICD-10-CM

## 2022-05-16 DIAGNOSIS — G25.9 MOVEMENT DISORDER: ICD-10-CM

## 2022-05-16 DIAGNOSIS — M54.41 CHRONIC BILATERAL LOW BACK PAIN WITH BILATERAL SCIATICA: ICD-10-CM

## 2022-05-16 DIAGNOSIS — S32.040A COMPRESSION FRACTURE OF L4 VERTEBRA, INITIAL ENCOUNTER (H): ICD-10-CM

## 2022-05-16 DIAGNOSIS — I15.9 SECONDARY HYPERTENSION: ICD-10-CM

## 2022-05-16 DIAGNOSIS — G31.83 LEWY BODY DEMENTIA WITH BEHAVIORAL DISTURBANCE (H): Primary | ICD-10-CM

## 2022-05-16 DIAGNOSIS — N39.0 URINARY TRACT INFECTION WITHOUT HEMATURIA, SITE UNSPECIFIED: ICD-10-CM

## 2022-05-16 DIAGNOSIS — Z00.00 HEALTHCARE MAINTENANCE: ICD-10-CM

## 2022-05-16 DIAGNOSIS — S32.000A COMPRESSION FRACTURE OF LUMBAR VERTEBRA, UNSPECIFIED LUMBAR VERTEBRAL LEVEL, INITIAL ENCOUNTER (H): ICD-10-CM

## 2022-05-16 DIAGNOSIS — Z78.9 ADVISED ABOUT MANAGEMENT OF WEIGHT: ICD-10-CM

## 2022-05-16 DIAGNOSIS — S22.081A CLOSED STABLE BURST FRACTURE OF TWELFTH THORACIC VERTEBRA, INITIAL ENCOUNTER (H): ICD-10-CM
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
## 2022-05-16 DIAGNOSIS — M54.42 CHRONIC BILATERAL LOW BACK PAIN WITH BILATERAL SCIATICA: ICD-10-CM

## 2022-05-16 DIAGNOSIS — G89.29 OTHER CHRONIC PAIN: ICD-10-CM

## 2022-05-16 DIAGNOSIS — F02.818 LEWY BODY DEMENTIA WITH BEHAVIORAL DISTURBANCE (H): Primary | ICD-10-CM

## 2022-05-16 DIAGNOSIS — S32.040A COMPRESSION FRACTURE OF L4 LUMBAR VERTEBRA, CLOSED, INITIAL ENCOUNTER (H): ICD-10-CM

## 2022-05-16 DIAGNOSIS — I10 PRIMARY HYPERTENSION: ICD-10-CM

## 2022-05-16 LAB
ALBUMIN UR-MCNC: 20 MG/DL
ANION GAP SERPL CALCULATED.3IONS-SCNC: 9 MMOL/L (ref 5–18)
APPEARANCE UR: ABNORMAL
ATRIAL RATE - MUSE: 88 BPM
BACTERIA #/AREA URNS HPF: ABNORMAL /HPF
BASOPHILS # BLD AUTO: 0 10E3/UL (ref 0–0.2)
BASOPHILS NFR BLD AUTO: 1 %
BILIRUB UR QL STRIP: NEGATIVE
BUN SERPL-MCNC: 18 MG/DL (ref 8–28)
CALCIUM SERPL-MCNC: 9.2 MG/DL (ref 8.5–10.5)
CHLORIDE BLD-SCNC: 105 MMOL/L (ref 98–107)
CO2 SERPL-SCNC: 29 MMOL/L (ref 22–31)
COLOR UR AUTO: YELLOW
CREAT SERPL-MCNC: 0.69 MG/DL (ref 0.6–1.1)
DIASTOLIC BLOOD PRESSURE - MUSE: 60 MMHG
EOSINOPHIL # BLD AUTO: 0 10E3/UL (ref 0–0.7)
EOSINOPHIL NFR BLD AUTO: 1 %
ERYTHROCYTE [DISTWIDTH] IN BLOOD BY AUTOMATED COUNT: 12 % (ref 10–15)
GFR SERPL CREATININE-BSD FRML MDRD: 86 ML/MIN/1.73M2
GLUCOSE BLD-MCNC: 124 MG/DL (ref 70–125)
GLUCOSE UR STRIP-MCNC: NEGATIVE MG/DL
HCT VFR BLD AUTO: 40.7 % (ref 35–47)
HGB BLD-MCNC: 12.8 G/DL (ref 11.7–15.7)
HGB UR QL STRIP: NEGATIVE
HYALINE CASTS: 7 /LPF
IMM GRANULOCYTES # BLD: 0 10E3/UL
IMM GRANULOCYTES NFR BLD: 0 %
INTERPRETATION ECG - MUSE: NORMAL
KETONES UR STRIP-MCNC: ABNORMAL MG/DL
LEUKOCYTE ESTERASE UR QL STRIP: NEGATIVE
LYMPHOCYTES # BLD AUTO: 1 10E3/UL (ref 0.8–5.3)
LYMPHOCYTES NFR BLD AUTO: 21 %
MCH RBC QN AUTO: 30.8 PG (ref 26.5–33)
MCHC RBC AUTO-ENTMCNC: 31.4 G/DL (ref 31.5–36.5)
MCV RBC AUTO: 98 FL (ref 78–100)
MONOCYTES # BLD AUTO: 0.4 10E3/UL (ref 0–1.3)
MONOCYTES NFR BLD AUTO: 8 %
MUCOUS THREADS #/AREA URNS LPF: PRESENT /LPF
NEUTROPHILS # BLD AUTO: 3.2 10E3/UL (ref 1.6–8.3)
NEUTROPHILS NFR BLD AUTO: 69 %
NITRATE UR QL: NEGATIVE
NRBC # BLD AUTO: 0 10E3/UL
NRBC BLD AUTO-RTO: 0 /100
P AXIS - MUSE: 82 DEGREES
PH UR STRIP: 6.5 [PH] (ref 5–7)
PLATELET # BLD AUTO: 205 10E3/UL (ref 150–450)
POTASSIUM BLD-SCNC: 4.1 MMOL/L (ref 3.5–5)
PR INTERVAL - MUSE: 178 MS
QRS DURATION - MUSE: 100 MS
QT - MUSE: 364 MS
QTC - MUSE: 440 MS
R AXIS - MUSE: 3 DEGREES
RBC # BLD AUTO: 4.16 10E6/UL (ref 3.8–5.2)
RBC URINE: 4 /HPF
SARS-COV-2 RNA RESP QL NAA+PROBE: NEGATIVE
SODIUM SERPL-SCNC: 143 MMOL/L (ref 136–145)
SP GR UR STRIP: 1.02 (ref 1–1.03)
SQUAMOUS EPITHELIAL: 7 /HPF
SYSTOLIC BLOOD PRESSURE - MUSE: 143 MMHG
T AXIS - MUSE: 89 DEGREES
UROBILINOGEN UR STRIP-MCNC: 2 MG/DL
VENTRICULAR RATE- MUSE: 88 BPM
WBC # BLD AUTO: 4.6 10E3/UL (ref 4–11)
WBC URINE: 12 /HPF
YEAST #/AREA URNS HPF: ABNORMAL /HPF

## 2022-05-16 PROCEDURE — 72128 CT CHEST SPINE W/O DYE: CPT

## 2022-05-16 PROCEDURE — 93005 ELECTROCARDIOGRAM TRACING: CPT | Performed by: EMERGENCY MEDICINE

## 2022-05-16 PROCEDURE — 72125 CT NECK SPINE W/O DYE: CPT

## 2022-05-16 PROCEDURE — 51702 INSERT TEMP BLADDER CATH: CPT

## 2022-05-16 PROCEDURE — 250N000013 HC RX MED GY IP 250 OP 250 PS 637: Performed by: HOSPITALIST

## 2022-05-16 PROCEDURE — 250N000011 HC RX IP 250 OP 636: Performed by: EMERGENCY MEDICINE

## 2022-05-16 PROCEDURE — U0003 INFECTIOUS AGENT DETECTION BY NUCLEIC ACID (DNA OR RNA); SEVERE ACUTE RESPIRATORY SYNDROME CORONAVIRUS 2 (SARS-COV-2) (CORONAVIRUS DISEASE [COVID-19]), AMPLIFIED PROBE TECHNIQUE, MAKING USE OF HIGH THROUGHPUT TECHNOLOGIES AS DESCRIBED BY CMS-2020-01-R: HCPCS | Performed by: EMERGENCY MEDICINE

## 2022-05-16 PROCEDURE — 72131 CT LUMBAR SPINE W/O DYE: CPT

## 2022-05-16 PROCEDURE — 87040 BLOOD CULTURE FOR BACTERIA: CPT | Performed by: EMERGENCY MEDICINE

## 2022-05-16 PROCEDURE — 120N000001 HC R&B MED SURG/OB

## 2022-05-16 PROCEDURE — 71250 CT THORAX DX C-: CPT

## 2022-05-16 PROCEDURE — 99285 EMERGENCY DEPT VISIT HI MDM: CPT | Mod: 25

## 2022-05-16 PROCEDURE — 81001 URINALYSIS AUTO W/SCOPE: CPT | Performed by: EMERGENCY MEDICINE

## 2022-05-16 PROCEDURE — 80048 BASIC METABOLIC PNL TOTAL CA: CPT | Performed by: EMERGENCY MEDICINE

## 2022-05-16 PROCEDURE — 99222 1ST HOSP IP/OBS MODERATE 55: CPT | Mod: AI | Performed by: HOSPITALIST

## 2022-05-16 PROCEDURE — 258N000003 HC RX IP 258 OP 636: Performed by: HOSPITALIST

## 2022-05-16 PROCEDURE — 250N000011 HC RX IP 250 OP 636: Performed by: HOSPITALIST

## 2022-05-16 PROCEDURE — 96365 THER/PROPH/DIAG IV INF INIT: CPT

## 2022-05-16 PROCEDURE — C9803 HOPD COVID-19 SPEC COLLECT: HCPCS

## 2022-05-16 PROCEDURE — 73560 X-RAY EXAM OF KNEE 1 OR 2: CPT | Mod: LT

## 2022-05-16 PROCEDURE — 70450 CT HEAD/BRAIN W/O DYE: CPT

## 2022-05-16 PROCEDURE — 85018 HEMOGLOBIN: CPT | Performed by: EMERGENCY MEDICINE

## 2022-05-16 PROCEDURE — 87086 URINE CULTURE/COLONY COUNT: CPT | Performed by: EMERGENCY MEDICINE

## 2022-05-16 PROCEDURE — 36415 COLL VENOUS BLD VENIPUNCTURE: CPT | Performed by: EMERGENCY MEDICINE

## 2022-05-16 PROCEDURE — 72170 X-RAY EXAM OF PELVIS: CPT

## 2022-05-16 PROCEDURE — 73552 X-RAY EXAM OF FEMUR 2/>: CPT | Mod: LT

## 2022-05-16 PROCEDURE — 96375 TX/PRO/DX INJ NEW DRUG ADDON: CPT

## 2022-05-16 RX ORDER — AMOXICILLIN 250 MG
1 CAPSULE ORAL DAILY
Status: DISCONTINUED | OUTPATIENT
Start: 2022-05-17 | End: 2022-05-25 | Stop reason: HOSPADM

## 2022-05-16 RX ORDER — GABAPENTIN 300 MG/1
300 CAPSULE ORAL 2 TIMES DAILY
Status: DISCONTINUED | OUTPATIENT
Start: 2022-05-16 | End: 2022-05-25 | Stop reason: HOSPADM

## 2022-05-16 RX ORDER — QUETIAPINE FUMARATE 25 MG/1
25 TABLET, FILM COATED ORAL AT BEDTIME
Status: DISCONTINUED | OUTPATIENT
Start: 2022-05-16 | End: 2022-05-20

## 2022-05-16 RX ORDER — SIMETHICONE 125 MG
125 TABLET,CHEWABLE ORAL 2 TIMES DAILY PRN
Status: DISCONTINUED | OUTPATIENT
Start: 2022-05-16 | End: 2022-05-25 | Stop reason: HOSPADM

## 2022-05-16 RX ORDER — LORAZEPAM 2 MG/ML
0.25 INJECTION INTRAMUSCULAR ONCE
Status: COMPLETED | OUTPATIENT
Start: 2022-05-16 | End: 2022-05-16

## 2022-05-16 RX ORDER — GABAPENTIN 300 MG/1
600 CAPSULE ORAL AT BEDTIME
Status: DISCONTINUED | OUTPATIENT
Start: 2022-05-16 | End: 2022-05-20

## 2022-05-16 RX ORDER — FENTANYL CITRATE 50 UG/ML
50 INJECTION, SOLUTION INTRAMUSCULAR; INTRAVENOUS ONCE
Status: COMPLETED | OUTPATIENT
Start: 2022-05-16 | End: 2022-05-16

## 2022-05-16 RX ORDER — POLYETHYLENE GLYCOL 3350 17 G/17G
17 POWDER, FOR SOLUTION ORAL DAILY PRN
Status: DISCONTINUED | OUTPATIENT
Start: 2022-05-16 | End: 2022-05-25 | Stop reason: HOSPADM

## 2022-05-16 RX ORDER — POLYETHYLENE GLYCOL 3350 17 G/17G
17 POWDER, FOR SOLUTION ORAL DAILY
Status: DISCONTINUED | OUTPATIENT
Start: 2022-05-17 | End: 2022-05-25 | Stop reason: HOSPADM

## 2022-05-16 RX ORDER — ACETAMINOPHEN 500 MG
1000 TABLET ORAL 3 TIMES DAILY
Status: DISCONTINUED | OUTPATIENT
Start: 2022-05-16 | End: 2022-05-25 | Stop reason: HOSPADM

## 2022-05-16 RX ORDER — NYSTATIN 100000 U/G
CREAM TOPICAL 2 TIMES DAILY
Status: ON HOLD | COMMUNITY
End: 2023-01-01

## 2022-05-16 RX ORDER — CARBIDOPA AND LEVODOPA 25; 100 MG/1; MG/1
1 TABLET ORAL 2 TIMES DAILY
Status: DISCONTINUED | OUTPATIENT
Start: 2022-05-16 | End: 2022-05-16

## 2022-05-16 RX ORDER — SIMETHICONE 125 MG
125 TABLET,CHEWABLE ORAL 2 TIMES DAILY
Status: DISCONTINUED | OUTPATIENT
Start: 2022-05-16 | End: 2022-05-25 | Stop reason: HOSPADM

## 2022-05-16 RX ORDER — AMOXICILLIN 250 MG
1 CAPSULE ORAL 2 TIMES DAILY PRN
Status: ON HOLD | COMMUNITY
End: 2023-01-01

## 2022-05-16 RX ORDER — OXYCODONE HYDROCHLORIDE 5 MG/1
5 TABLET ORAL 4 TIMES DAILY PRN
Status: ON HOLD | COMMUNITY
End: 2022-05-19

## 2022-05-16 RX ORDER — LACTOBACILLUS RHAMNOSUS GG 10B CELL
1 CAPSULE ORAL DAILY
Status: DISCONTINUED | OUTPATIENT
Start: 2022-05-17 | End: 2022-05-25 | Stop reason: HOSPADM

## 2022-05-16 RX ORDER — CIPROFLOXACIN 2 MG/ML
400 INJECTION, SOLUTION INTRAVENOUS ONCE
Status: DISCONTINUED | OUTPATIENT
Start: 2022-05-16 | End: 2022-05-16

## 2022-05-16 RX ORDER — NYSTATIN 100000 U/G
CREAM TOPICAL 2 TIMES DAILY
Status: DISCONTINUED | OUTPATIENT
Start: 2022-05-16 | End: 2022-05-25 | Stop reason: HOSPADM

## 2022-05-16 RX ORDER — AMOXICILLIN 250 MG
1 CAPSULE ORAL 2 TIMES DAILY PRN
Status: DISCONTINUED | OUTPATIENT
Start: 2022-05-16 | End: 2022-05-25 | Stop reason: HOSPADM

## 2022-05-16 RX ORDER — ONDANSETRON 4 MG/1
4 TABLET, ORALLY DISINTEGRATING ORAL EVERY 6 HOURS PRN
Status: DISCONTINUED | OUTPATIENT
Start: 2022-05-16 | End: 2022-05-25 | Stop reason: HOSPADM

## 2022-05-16 RX ORDER — OXYCODONE HYDROCHLORIDE 5 MG/1
5-10 TABLET ORAL EVERY 4 HOURS PRN
Status: DISCONTINUED | OUTPATIENT
Start: 2022-05-16 | End: 2022-05-18

## 2022-05-16 RX ORDER — SODIUM CHLORIDE 9 MG/ML
INJECTION, SOLUTION INTRAVENOUS CONTINUOUS
Status: DISCONTINUED | OUTPATIENT
Start: 2022-05-16 | End: 2022-05-18

## 2022-05-16 RX ORDER — OLANZAPINE 10 MG/2ML
2.5 INJECTION, POWDER, FOR SOLUTION INTRAMUSCULAR ONCE
Status: DISCONTINUED | OUTPATIENT
Start: 2022-05-16 | End: 2022-05-16 | Stop reason: CLARIF

## 2022-05-16 RX ORDER — CIPROFLOXACIN 2 MG/ML
400 INJECTION, SOLUTION INTRAVENOUS EVERY 12 HOURS
Status: DISCONTINUED | OUTPATIENT
Start: 2022-05-16 | End: 2022-05-18 | Stop reason: CLARIF

## 2022-05-16 RX ORDER — VITAMIN B COMPLEX
50 TABLET ORAL DAILY
Status: DISCONTINUED | OUTPATIENT
Start: 2022-05-17 | End: 2022-05-25 | Stop reason: HOSPADM

## 2022-05-16 RX ORDER — LOSARTAN POTASSIUM 50 MG/1
50 TABLET ORAL 2 TIMES DAILY
Status: DISCONTINUED | OUTPATIENT
Start: 2022-05-16 | End: 2022-05-25 | Stop reason: HOSPADM

## 2022-05-16 RX ORDER — LEVOTHYROXINE SODIUM 50 UG/1
100 TABLET ORAL DAILY
Status: DISCONTINUED | OUTPATIENT
Start: 2022-05-17 | End: 2022-05-25 | Stop reason: HOSPADM

## 2022-05-16 RX ORDER — BISACODYL 10 MG
10 SUPPOSITORY, RECTAL RECTAL DAILY PRN
Status: DISCONTINUED | OUTPATIENT
Start: 2022-05-16 | End: 2022-05-25 | Stop reason: HOSPADM

## 2022-05-16 RX ORDER — HYDRALAZINE HCL 10 MG
5 TABLET ORAL 2 TIMES DAILY
Status: DISCONTINUED | OUTPATIENT
Start: 2022-05-16 | End: 2022-05-20

## 2022-05-16 RX ORDER — ONDANSETRON 2 MG/ML
4 INJECTION INTRAMUSCULAR; INTRAVENOUS EVERY 6 HOURS PRN
Status: DISCONTINUED | OUTPATIENT
Start: 2022-05-16 | End: 2022-05-25 | Stop reason: HOSPADM

## 2022-05-16 RX ORDER — LACTOBACILLUS RHAMNOSUS GG 10B CELL
1 CAPSULE ORAL DAILY
COMMUNITY
End: 2023-01-01

## 2022-05-16 RX ORDER — POLYETHYLENE GLYCOL 3350 17 G/17G
17 POWDER, FOR SOLUTION ORAL DAILY PRN
COMMUNITY
End: 2022-11-07

## 2022-05-16 RX ORDER — CARBIDOPA AND LEVODOPA 25; 100 MG/1; MG/1
1 TABLET ORAL 3 TIMES DAILY
Status: DISCONTINUED | OUTPATIENT
Start: 2022-05-16 | End: 2022-05-25 | Stop reason: HOSPADM

## 2022-05-16 RX ORDER — AMOXICILLIN 250 MG
1 CAPSULE ORAL DAILY
Status: ON HOLD | COMMUNITY
End: 2023-01-01

## 2022-05-16 RX ORDER — LIDOCAINE 50 MG/G
1 PATCH TOPICAL EVERY 24 HOURS
Status: ON HOLD | COMMUNITY
End: 2023-01-01

## 2022-05-16 RX ORDER — DULOXETIN HYDROCHLORIDE 60 MG/1
60 CAPSULE, DELAYED RELEASE ORAL DAILY
Status: DISCONTINUED | OUTPATIENT
Start: 2022-05-17 | End: 2022-05-25 | Stop reason: HOSPADM

## 2022-05-16 RX ADMIN — ACETAMINOPHEN 1000 MG: 500 TABLET ORAL at 18:24

## 2022-05-16 RX ADMIN — CIPROFLOXACIN 400 MG: 2 INJECTION, SOLUTION INTRAVENOUS at 16:43

## 2022-05-16 RX ADMIN — GABAPENTIN 300 MG: 300 CAPSULE ORAL at 18:24

## 2022-05-16 RX ADMIN — QUETIAPINE FUMARATE 25 MG: 25 TABLET ORAL at 20:17

## 2022-05-16 RX ADMIN — GABAPENTIN 600 MG: 300 CAPSULE ORAL at 22:22

## 2022-05-16 RX ADMIN — POLYETHYLENE GLYCOL AND PROPYLENE GLYCOL 2 DROP: 4; 3 SOLUTION/ DROPS OPHTHALMIC at 22:20

## 2022-05-16 RX ADMIN — NYSTATIN: 100000 CREAM TOPICAL at 20:16

## 2022-05-16 RX ADMIN — CARBIDOPA AND LEVODOPA 1 TABLET: 25; 100 TABLET ORAL at 18:25

## 2022-05-16 RX ADMIN — SODIUM CHLORIDE: 9 INJECTION, SOLUTION INTRAVENOUS at 18:32

## 2022-05-16 RX ADMIN — OXYCODONE HYDROCHLORIDE 10 MG: 5 TABLET ORAL at 18:24

## 2022-05-16 RX ADMIN — FENTANYL CITRATE 50 MCG: 50 INJECTION, SOLUTION INTRAMUSCULAR; INTRAVENOUS at 13:47

## 2022-05-16 RX ADMIN — LOSARTAN POTASSIUM 50 MG: 50 TABLET, FILM COATED ORAL at 20:07

## 2022-05-16 RX ADMIN — BUPRENORPHINE HYDROCHLORIDE 150 MCG: 150 FILM, SOLUBLE BUCCAL at 20:16

## 2022-05-16 RX ADMIN — LORAZEPAM 0.25 MG: 2 INJECTION INTRAMUSCULAR; INTRAVENOUS at 20:08

## 2022-05-16 RX ADMIN — SIMETHICONE 125 MG: 125 TABLET, CHEWABLE ORAL at 20:06

## 2022-05-16 ASSESSMENT — ACTIVITIES OF DAILY LIVING (ADL)
ADLS_ACUITY_SCORE: 37
DEPENDENT_IADLS:: CLEANING;COOKING;LAUNDRY;SHOPPING;MEAL PREPARATION;MEDICATION MANAGEMENT;MONEY MANAGEMENT;TRANSPORTATION;INCONTINENCE
ADLS_ACUITY_SCORE: 41
ADLS_ACUITY_SCORE: 41
ADLS_ACUITY_SCORE: 39

## 2022-05-16 NOTE — PHARMACY-ADMISSION MEDICATION HISTORY
Pharmacy Note - Admission Medication History    Pertinent Provider Information: n/a     ______________________________________________________________________    Prior To Admission (PTA) med list completed and updated in EMR.       PTA Med List   Medication Sig Note Last Dose     acetaminophen (TYLENOL) 500 MG tablet Take 2 tablets (1,000 mg) by mouth 3 times daily  5/16/2022 at 7AM     bisacodyl (DULCOLAX) 10 MG suppository Place 10 mg rectally daily as needed for constipation  Unknown at PRN     Buprenorphine HCl (BELBUCA) 150 MCG FILM buccal film Place 150 mcg inside cheek 2 times daily  5/16/2022 at 7AM     carbidopa-levodopa (SINEMET)  MG tablet Take 1 tablet by mouth 2 times daily  5/16/2022 at 7AM     diclofenac (VOLTAREN) 1 % topical gel Apply 2 g topically 3 times daily as needed for moderate pain 5/16/2022: Scheduled ordered at care facility, but pt doesn't use often  Unknown at PRN     DULoxetine (CYMBALTA) 60 MG capsule Take 60 mg by mouth daily  5/16/2022 at 7AM     gabapentin (NEURONTIN) 300 MG capsule Take 600 mg by mouth At Bedtime  5/15/2022 at Unknown time     gabapentin (NEURONTIN) 300 MG capsule Take 300 mg by mouth 2 times daily AM and 1345  5/16/2022 at 7AM     hydrALAZINE (APRESOLINE) 10 MG tablet Take 5 mg by mouth 2 times daily  5/16/2022 at 7AM     lactobacillus rhamnosus, GG, (CULTURELL) capsule Take 1 capsule by mouth daily  5/16/2022 at am     levothyroxine (SYNTHROID/LEVOTHROID) 100 MCG tablet Take 1 tablet (100 mcg) by mouth daily  5/16/2022 at 7AM     lidocaine (LIDODERM) 5 % patch Place 1 patch onto the skin every 24 hours To prevent lidocaine toxicity, patient should be patch free for 12 hrs daily.  Unknown at never used     losartan (COZAAR) 50 MG tablet Take 50 mg by mouth 2 times daily  5/16/2022 at 7AM     naloxone (NARCAN) 4 MG/0.1ML nasal spray Spray 4 mg into one nostril alternating nostrils once as needed for opioid reversal every 2-3 minutes until assistance arrives   Unknown at never used     nystatin (MYCOSTATIN) 877852 UNIT/GM external cream Apply topically 2 times daily APPLY TOPICALLY TO AREA UNDER  right BREAST  5/16/2022 at 7AM     oxyCODONE (ROXICODONE) 5 MG tablet Take 5 mg by mouth 4 times daily as needed for severe pain (severe breakthrough pain rated 7)  Unknown at Unknown time     polyethylene glycol (MIRALAX) 17 g packet Take 17 g by mouth daily as needed for constipation If pt has not had a BM during the day  Unknown at Unknown time     polyethylene glycol (MIRALAX) 17 GM/Dose powder Take 17 g by mouth daily  5/16/2022 at am     polyethylene glycol-propylene glycol (SYSTANE ULTRA) 0.4-0.3 % SOLN ophthalmic solution Place 2 drops into both eyes every hour as needed for dry eyes  Unknown at Unknown time     polyethylene glycol-propylene glycol (SYSTANE ULTRA) 0.4-0.3 % SOLN ophthalmic solution Place 2 drops into both eyes 3 times daily (and additionally as needed/requested)  5/16/2022 at 7AM     QUEtiapine (SEROQUEL) 25 MG tablet Take 1 tablet (25 mg) by mouth At Bedtime  5/15/2022 at Unknown time     senna-docusate (SENOKOT-S/PERICOLACE) 8.6-50 MG tablet Take 1 tablet by mouth daily  5/16/2022 at Unknown time     senna-docusate (SENOKOT-S/PERICOLACE) 8.6-50 MG tablet Take 1 tablet by mouth 2 times daily as needed for constipation  PRN     simethicone (MYLICON) 125 MG chewable tablet Take 125 mg by mouth 2 times daily  5/16/2022 at 7AM     Simethicone 125 MG TABS Take 1 chew tab by mouth 2 times daily as needed bloating  PRN     sodium phosphate (FLEET ENEMA) 7-19 GM/118ML rectal enema Place 1 enema rectally once as needed for constipation  PRN     vitamin D3 (CHOLECALCIFEROL) 50 mcg (2000 units) tablet Take 1 tablet (50 mcg) by mouth daily  5/16/2022 at 7AM       Information source(s): Facility ([facility]/) medication list/MAR  Method of interview communication: N/A    Summary of Changes to PTA Med List  New: nystatin, oxycodone, lidocaine patch, enema  Discontinued:  hctz  Changed: belbuca dosing, gabapentin dosing, PRN seroquel    Patient was asked about OTC/herbal products specifically.  PTA med list reflects this.    In the past week, patient estimated taking medication this percent of the time:  greater than 90%.    Allergies were reviewed, assessed, and updated with the patient.      Patient did not bring any medications to the hospital and can't retrieve from home. No multi-dose medications are available for use during hospital stay.     The information provided in this note is only as accurate as the sources available at the time of the update(s).    Thank you for the opportunity to participate in the care of this patient.    Yumiko Constantino [facility]  5/16/2022 3:14 PM

## 2022-05-16 NOTE — ED NOTES
Bed: ED-04  Expected date:   Expected time:   Means of arrival:   Comments:  82 y.o. fall, no thinners, head hematoma. Left leg rotation

## 2022-05-16 NOTE — H&P
[facility] MEDICINE ADMISSION HISTORY AND PHYSICAL     Assessment & Plan      Mehreen Camara is a 82 year old old female who presented to the emergency department for fall at her nursing facility.  ED evaluation with T12 compression fracture.  Patient was too painful to sit up or ambulate.  Other imaging was negative for acute fracture.  She has a history of Lewy body dementia.    Assessment and Plan:  Unwitnessed fall with T12 compression fracture  She is opiate tolerant  Scheduled Tylenol, higher dose oxycodone as needed, PT and OT evaluations  Spine consult ordered in ED to give further recommendations  IV Dilaudid as needed for breakthrough pain  Monitor neuro status of lower extremities    Lewy body dementia  Parkinson's disease? Multiple system atrophy  Chronic pain with opiate tolerance  As an outpatient she takes buprenorphine twice daily, Sinemet, duloxetine, gabapentin, oxycodone, Seroquel  Continue her usual outpatient medications but increase the dose of oxycodone  Consult pain team  Increase sinemet to tid, pt and dtr notice improvement with higher dose, recently decreased    Essential hypertension  Blood pressure quite elevated initially  As an outpatient she takes losartan, hydralazine, will continue these    Chronic right shoulder pain     Abnormal urinalysis  Difficult to assess for symptoms, white count only slightly elevated  Continue ciprofloxacin as started in emergency department, follow urine culture    DVT proph: Mechanical  Code Status: DNR as listed by several previous hospital stays  Disposition: Observation   Diet: Regular  Pain tx: Tylenol, oxycodone, buprenorphine  PT/OT: Both ordered      Chief Complaint  fall, back pain     HISTORY     Mehreen Camara is a 82 year old old female who was brought to the emergency department after falling at her nursing home.  She lives in memory care.  She has a history of multiple system atrophy.    She had severe  back pain, struck her head but did not lose consciousness.  She says that she just tripped on something but she is not sure exactly what.    Denies significant chest pain, shortness of breath, nausea, vomiting.  Does get troubled by constipation.  Has chronic pain and is on buprenorphine, oxycodone, gabapentin, Cymbalta.  Chronic pain is typically in her back.  She also has some abdominal discomfort and IBS chronically.    She says that she has a history of multiple system atrophy.  She has been on Sinemet for some time but recently had it decreased or stopped.  The patient and daughter feel that she was doing much better on higher dose of Sinemet.  She says that she did have some dysuria a couple of weeks ago but does not really have much for urinary burning.  She says that she does urinate frequently and finds it difficult to sleep at night due to urinary frequency.    Past Medical History   Past Medical History:  No date: AAA (abdominal aortic aneurysm) (H)  12/16/2013: Abdominal pain, epigastric  9/15/2016: Abnormal computed tomography scan  9/15/2016: Abnormal finding on imaging  9/26/2015: Acute encephalopathy  9/17/2018: Adjustment disorder with anxious mood  9/17/2018: Adjustment disorder with mixed anxiety and depressed mood  No date: Adrenal adenoma      Comment:  stable, thought not to be hormonally active  No date: Adrenal adenoma  No date: Agitation  11/18/2010: Anemia  2/25/2008: Aneurysm of abdominal vessel (H)      Comment:  Created by [employer] [facility] Annotation: Jun 8 2011  8:07AM - Danni Ortiz: 4.4 on 11/2013.   Needs                6-12 month u/s or CT.  Replacement Utility updated for                latest IMO load  Overview:  2/08 Abd US: 5.4 cm. 3/08 CT                Abd: 2.7 x 3.3. 9/08:  MRI Abd: 2.7X3.2  Next 9/09.  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      Comment:  Created by [employer] [facility] Annotation: Feb  3                2012  8:Cameron Elam: Needs EGD 2/2017                  Overview:  Overview:  Created by [facility]                Annotation: Feb  3 2012  8:LEIGH ObandoCameron: Needs                EGD 2/2017 9/19/2016: Benign neoplasm of ascending colon  8/5/2020: Bilateral knee pain  5/5/2017: Bloating  9/17/2018: Bradycardia  9/17/2018: Cataract      Comment:  Created by Conversion   Overview:  Overview:  Created by               Conversion  1/20/2013: Chest wall pain  9/29/2016: Chronic low back pain  No date: Cognitive and behavioral changes  10/10/2020: Compression fracture of lumbar vertebra (H)  No date: Constipation  11/22/2006: Contact dermatitis and eczema  2/26/2008: DDD (degenerative disc disease), lumbosacral  No date: Depression  7/13/2009: Depression, major  No date: Depressive disorder  9/18/2018: Diaphragmatic hernia      Comment:  Created by Conversion  Replacement Utility updated for                latest IMO load  Overview:  Overview:  Created by                Conversion  Replacement Utility updated for latest IMO                load  9/15/2016: Dietary counseling and surveillance  9/17/2018: Disorder of bone and cartilage      Comment:  Created by [employer] [facility] Annotation: Dec 13                2012  7:Roberta Padron: vit D/Ca, f/u  Dexa                needed 1/2014.  Replacement Utility updated for latest                IMO load  Overview:  Overview:  Created by [facility] Annotation: Dec 13 2012  7:Roberta Padron: vit D/Ca, f/u  Dexa needed 1/2014.  Replacement                Utility updated for latest IMO load  12/18/2014: Diverticulosis of colon  No date: Dizziness and giddiness      Comment:  Created by  Conversion   12/17/2013: Dysphagia  12/16/2013: Early satiety  No date: Esophageal reflux      Comment:  Created by Conversion   11/22/2006: Essential hypertension      Comment:  Created by Conversion  Replacement Utility updated for                latest IMO load  Overview:  Overview:  Created by                Conversion  Replacement Utility updated for latest IMO                load  8/5/2020: Fall  2/19/2014: Flatulence, eructation and gas pain  9/19/2016: Gaseous abdominal distention  6/6/2017: Gastro-esophageal reflux disease with esophagitis  7/4/2018: Generalized muscle weakness  No date: GERD (gastroesophageal reflux disease)  9/19/2016: Hemorrhage of rectum and anus  No date: Hiatal hernia  No date: HLD (hyperlipidemia)  4/6/2010: Hoarseness of voice  No date: HTN (hypertension)  No date: Hypertension  6/29/2007: Hypokalemia  No date: Hypothyroid  9/22/2009: Hypothyroidism      Comment:  Created by Conversion  Replacement Utility updated for                latest IMO load  Overview:  Overview:  Created by                Conversion  Replacement Utility updated for latest IMO                load  9/17/2018: Insomnia due to anxiety and fear  No date: Insomnia due to mental condition  11/22/2006: Irritable bowel syndrome  No date: Lower back pain  9/17/2018: Major depressive disorder, recurrent episode (H)      Comment:  Created by Conversion  Replacement Utility updated for                latest IMO load  Overview:  Overview:  Created by                Conversion  Replacement Utility updated for latest IMO                load  No date: Major depressive disorder, single episode, moderate (H)  7/10/2007: Malaise and fatigue  No date: Metabolic encephalopathy  No date: Migraine with aura  2/10/2008: Mixed hyperlipidemia      Comment:  Created by Conversion   No date: Mood disorder due to a general medical condition  9/19/2018: Movement disorder  No date: Multiple system atrophy P (H)  11/14/2018: Multiple system  atrophy P (H)  No date: Murmur  10/23/2020: Nonrheumatic aortic valve stenosis  11/22/2006: Nontoxic multinodular goiter      Comment:  Overview:  thyroidectomy 7/07 for atypical cells on FNA-               benign cysts seen at time of surgical pathology  No date: Obesity, unspecified      Comment:  Created by Conversion   9/22/2017: Orthostatic hypotension dysautonomic syndrome  No date: Osteoarthritis  No date: Partial small bowel obstruction (H)  9/19/2016: Polyp of colon  12/12/2018: Post-op pain  1/15/2013: Postoperative hypothyroidism      Comment:  Overview:  7/2/07-  Thyroidectomy by Dr Wang Crenshaw  10/26/2010: Prediabetes  7/4/2017: Primary insomnia  8/5/2020: REM sleep behavior disorder  4/28/2014: Retinal migraine  No date: Rheumatic fever      Comment:  thought to have had as a child  12/31/2019: Rupture of left Achilles tendon, sequela  11/30/2015: S/P AAA repair using bifurcation graft  12/12/2018: S/P laparoscopic cholecystectomy  No date: Scoliosis  No date: Sleep difficulties  No date: Small bowel obstruction (H)  No date: Thyroid disease  No date: Thyroid nodule      Comment:  removed nodules and thyroid  No date: Tinnitus      Comment:  Created by Conversion  Replacement Utility updated for                latest IMO load  11/22/2006: Undiagnosed cardiac murmurs  1/15/2013: Vitamin D deficiency  4/29/2018: Weak  5/4/2017: Weakness  7/5/2018: Weakness of both lower extremities   Surgical History     Past Surgical History:   Procedure Laterality Date     AAA REPAIR  2015    wtih bifurcation graft     CARPAL TUNNEL RELEASE RT/LT Bilateral 2013     HC REVISE MEDIAN N/CARPAL TUNNEL SURG      Description: Neuroplasty Decompression Median Nerve At Carpal Tunnel;  Recorded: 01/21/2013;  Comments: bilateral     HYSTERECTOMY  2013     IR ABDOMINAL ENDOVASCULAR STENT GRAFT  11/30/2015     JOINT REPLACEMENT Right 2003     JOINT REPLACEMENT       LAPAROSCOPIC CHOLECYSTECTOMY N/A 12/12/2018    Procedure:  LAPAROSCOPIC CHOLECYSTECTOMY;  Surgeon: Amadeo Sebastian MD;  Location: [facility] OR     ORTHOPEDIC SURGERY       ND THYROIDECTOMY      Description: Thyroid Surgery Total Thyroidectomy;  Recorded: 2011;     SPINE SURGERY  1981    cervical spine fusion     THYROIDECTOMY       XR MAJOR JOINT OR BURSA INJ/ASP BILATERAL  2020     XR MAJOR JOINT OR BURSA INJ/ASP UNILATERAL  2020     ZZC CERV SPINE FUSN,ANTER,BELOW C2      Description: Cervical Vertebral Fusion;  Recorded: 2013;  Comments:      ZZC TOTAL ABDOM HYSTERECTOMY  1985    Description: Hysterectomy;  Recorded: 2013;     C TOTAL HIP ARTHROPLASTY Right     Description: Total Hip Replacement;  Recorded: 2013;  Comments: right,      Family History    Reviewed, and   Family History   Problem Relation Age of Onset     Hypertension Mother      Coronary Artery Disease Mother      Coronary Artery Disease Father      Other Cancer Brother      Parkinsonism Other      Heart Disease Mother      Heart Disease Father      Cancer Brother 57.00        colon     Hypertension Daughter      Hypertension Daughter      Neuropathy Daughter      Lupus Daughter      Heart Disease Paternal Aunt      Heart Disease Paternal Uncle      Parkinsonism Paternal Uncle       Social History      Social History     Tobacco Use     Smoking status: Former Smoker     Packs/day: 0.50     Types: Cigarettes     Quit date: 1994     Years since quittin.4     Smokeless tobacco: Never Used   Substance Use Topics     Alcohol use: No     Drug use: No      Allergies     Allergies   Allergen Reactions     Penicillins Anaphylaxis, Rash and Shortness Of Breath     Aspirin Nausea and GI Disturbance     Atenolol Cough     Atorvastatin Muscle Pain (Myalgia) and Nausea and Vomiting     Bupropion Nausea     Cephalexin Rash     Localized to neck area     Clindamycin Other (See Comments)     Constipation      Codeine Nausea     Ibuprofen Nausea and GI Disturbance      Lovastatin Muscle Pain (Myalgia)     Prior to Admission Medications      Prior to Admission Medications   Prescriptions Last Dose Informant Patient Reported? Taking?   Buprenorphine HCl (BELBUCA) 150 MCG FILM buccal film 5/16/2022 at 7AM  Yes Yes   Sig: Place 150 mcg inside cheek 2 times daily   DULoxetine (CYMBALTA) 60 MG capsule 5/16/2022 at 7AM  Yes Yes   Sig: Take 60 mg by mouth daily   QUEtiapine (SEROQUEL) 25 MG tablet 5/15/2022 at Unknown time  No Yes   Sig: Take 1 tablet (25 mg) by mouth At Bedtime   Simethicone 125 MG TABS PRN  Yes Yes   Sig: Take 1 chew tab by mouth 2 times daily as needed bloating   acetaminophen (TYLENOL) 500 MG tablet 5/16/2022 at 7AM  No Yes   Sig: Take 2 tablets (1,000 mg) by mouth 3 times daily   bisacodyl (DULCOLAX) 10 MG suppository Unknown at PRN  Yes Yes   Sig: Place 10 mg rectally daily as needed for constipation   carbidopa-levodopa (SINEMET)  MG tablet 5/16/2022 at 7AM  Yes Yes   Sig: Take 1 tablet by mouth 2 times daily   diclofenac (VOLTAREN) 1 % topical gel Unknown at PRN  Yes Yes   Sig: Apply 2 g topically 3 times daily as needed for moderate pain   gabapentin (NEURONTIN) 300 MG capsule 5/16/2022 at 7AM Nursing Home Yes Yes   Sig: Take 300 mg by mouth 2 times daily AM and 1345   gabapentin (NEURONTIN) 300 MG capsule 5/15/2022 at Unknown time  Yes Yes   Sig: Take 600 mg by mouth At Bedtime   hydrALAZINE (APRESOLINE) 10 MG tablet 5/16/2022 at 7AM  Yes Yes   Sig: Take 5 mg by mouth 2 times daily   lactobacillus rhamnosus, GG, (CULTURELL) capsule 5/16/2022 at am  Yes Yes   Sig: Take 1 capsule by mouth daily   levothyroxine (SYNTHROID/LEVOTHROID) 100 MCG tablet 5/16/2022 at 7AM  No Yes   Sig: Take 1 tablet (100 mcg) by mouth daily   lidocaine (LIDODERM) 5 % patch Unknown at never used  Yes Yes   Sig: Place 1 patch onto the skin every 24 hours To prevent lidocaine toxicity, patient should be patch free for 12 hrs daily.   losartan (COZAAR) 50 MG tablet 5/16/2022 at 7AM   Yes Yes   Sig: Take 50 mg by mouth 2 times daily   naloxone (NARCAN) 4 MG/0.1ML nasal spray Unknown at never used  Yes Yes   Sig: Spray 4 mg into one nostril alternating nostrils once as needed for opioid reversal every 2-3 minutes until assistance arrives   nystatin (MYCOSTATIN) [id number] UNIT/GM external cream 5/16/2022 at 7AM  Yes Yes   Sig: Apply topically 2 times daily APPLY TOPICALLY TO AREA UNDER  right BREAST   oxyCODONE (ROXICODONE) 5 MG tablet Unknown at Unknown time  Yes Yes   Sig: Take 5 mg by mouth 4 times daily as needed for severe pain (severe breakthrough pain rated 7)   polyethylene glycol (MIRALAX) 17 GM/Dose powder 5/16/2022 at am  No Yes   Sig: Take 17 g by mouth daily   polyethylene glycol (MIRALAX) 17 g packet Unknown at Unknown time  Yes Yes   Sig: Take 17 g by mouth daily as needed for constipation If pt has not had a BM during the day   polyethylene glycol-propylene glycol (SYSTANE ULTRA) 0.4-0.3 % SOLN ophthalmic solution 5/16/2022 at 7AM  Yes Yes   Sig: Place 2 drops into both eyes 3 times daily (and additionally as needed/requested)   polyethylene glycol-propylene glycol (SYSTANE ULTRA) 0.4-0.3 % SOLN ophthalmic solution Unknown at Unknown time  Yes Yes   Sig: Place 2 drops into both eyes every hour as needed for dry eyes   senna-docusate (SENOKOT-S/PERICOLACE) 8.6-50 MG tablet 5/16/2022 at Unknown time  Yes Yes   Sig: Take 1 tablet by mouth daily   senna-docusate (SENOKOT-S/PERICOLACE) 8.6-50 MG tablet PRN  Yes Yes   Sig: Take 1 tablet by mouth 2 times daily as needed for constipation   simethicone (MYLICON) 125 MG chewable tablet 5/16/2022 at 7AM  Yes Yes   Sig: Take 125 mg by mouth 2 times daily   sodium phosphate (FLEET ENEMA) 7-19 GM/118ML rectal enema PRN  Yes Yes   Sig: Place 1 enema rectally once as needed for constipation   vitamin D3 (CHOLECALCIFEROL) 50 mcg (2000 units) tablet 5/16/2022 at 7AM  No Yes   Sig: Take 1 tablet (50 mcg) by mouth daily      Facility-Administered  Medications: None      Review of Systems   A 12 point comprehensive review of systems was negative except as noted above in HPI.  PHYSICAL EXAMINATION     Vitals    Temp:  [97.3  F (36.3  C)] 97.3  F (36.3  C)  Pulse:  [81-99] 99  Resp:  [17-23] 23  BP: (168-212)/() 197/115  SpO2:  [93 %-97 %] 93 %  Examination   Physical Exam:    Gen: no acute distress, comfortable, weak, slightly drowsy  ENT: no scleral icterus  Pulm: lungs are clear without wheezing, crackles  CV: regular rate and rhythm, systolic murmur, trace lower extremity edema  GI: abdomen is soft, nondistended, does have slight lower abdominal tenderness  MSK: no significant peripheral pitting edema, no noticeable swelling/erythema or warmth of joints.  Derm: no rashes on examined areas of skin, no jaundice, skin is dry and warm.   Psych: appropriate affect, stiff facial expression    Scot Jon DO  [facility]   Phone: #[phone]

## 2022-05-16 NOTE — ED TRIAGE NOTES
"Patient presents to the ER via EMS after a mechanical, unwitnessed fall just outside of the dining room area. Patient states she \"tripped over something\". Pain to bilateral hips. Patient has goosegg to left forehead, staff unsure if this is new or old. Patient alert to self and situation but not date or place. Was re-oriented to surroundings and plan of care.     Triage Assessment     Row Name 05/16/22 1247       Triage Assessment (Adult)    Airway WDL WDL       Respiratory WDL    Respiratory WDL WDL       Skin Circulation/Temperature WDL    Skin Circulation/Temperature WDL WDL       Cardiac WDL    Cardiac WDL WDL       Peripheral/Neurovascular WDL    Peripheral Neurovascular WDL WDL;X;neurovascular assessment lower       LLE Neurovascular Assessment    Temperature LLE cool    Color LLE pale    Sensation LLE tingling present;numbness present       RLE Neurovascular Assessment    Temperature RLE cool    Color RLE pale    Sensation RLE tingling present;numbness present       Cognitive/Neuro/Behavioral WDL    Cognitive/Neuro/Behavioral WDL X    Level of Consciousness confused  baseline dementia    Arousal Level opens eyes spontaneously    Orientation disoriented to;time;place    Speech clear;slow    Mood/Behavior cooperative;calm       Pupils (CN II)    Pupil Size Left 3 mm    Pupil Size Right 3 mm       Freda Coma Scale    Best Eye Response 4-->(E4) spontaneous    Best Motor Response 6-->(M6) obeys commands    Best Verbal Response 4-->(V4) confused    Freda Coma Scale Score 14              "

## 2022-05-16 NOTE — CONSULTS
Care Management Initial Consult    General Information  Assessment completed with: Patient, Children,    Type of CM/SW Visit: Initial Assessment    Primary Care Provider verified and updated as needed: Yes   Readmission within the last 30 days: no previous admission in last 30 days      Reason for Consult: discharge planning  Advance Care Planning: Advance Care Planning Reviewed: present on chart          Communication Assessment  Patient's communication style: spoken language (English or Bilingual)             Cognitive  Cognitive/Neuro/Behavioral: .WDL except  Level of Consciousness: confused  Arousal Level: opens eyes spontaneously  Orientation: disoriented to, place, time  Mood/Behavior: calm, cooperative  Best Language: 0 - No aphasia  Speech: clear, slow    Living Environment:   People in home:       Current living Arrangements: other (see comments)  Name of Facility: [facility]   Able to return to prior arrangements:         Family/Social Support:  Care provided by: other (see comments)  Provides care for: no one, unable/limited ability to care for self  Marital Status:   Children          Description of Support System: Supportive         Current Resources:   Patient receiving home care services: No     Community Resources: None  Equipment currently used at home: walker, rolling  Supplies currently used at home: Incontinence Supplies    Employment/Financial:  Employment Status:          Financial Concerns:             Lifestyle & Psychosocial Needs:  Social Determinants of Health     Tobacco Use: Medium Risk     Smoking Tobacco Use: Former Smoker     Smokeless Tobacco Use: Never Used   Alcohol Use: Not on file   Financial Resource Strain: Not on file   Food Insecurity: Not on file   Transportation Needs: Not on file   Physical Activity: Not on file   Stress: Not on file   Social Connections: Not on file   Intimate Partner Violence: Not on file   Depression: At risk     PHQ-2  Score: 3   Housing Stability: Not on file       Functional Status:  Prior to admission patient needed assistance:   Dependent ADLs:: Ambulation-walker, Bathing, Dressing, Toileting  Dependent IADLs:: Cleaning, Cooking, Laundry, Shopping, Meal Preparation, Medication Management, Money Management, Transportation, Incontinence  Assesssment of Functional Status: Not at baseline with ADL Functioning    Mental Health Status:          Chemical Dependency Status:                Values/Beliefs:  Spiritual, Cultural Beliefs, Mu-ism Practices, Values that affect care:                 Additional Information:  AIDET completed. Writer met with Pt and Pts daughter Alysa: [phone].  Pt resides at [facility]. She has Lewy Body Dementia and history of Parkinson's. Pt uses walker and gets assist with all cares. She fell and now has compression fracture T12.     Anticipate pt will DC back to memory care with HC vs TCU depending on progression of care.     Family will transport upon DC. Informed that CM will follow progression of care.   Nicole Palma RN, CM, ROSY

## 2022-05-16 NOTE — ED PROVIDER NOTES
EMERGENCY DEPARTMENT ENCOUNTER      NAME: Mehreen Camara  AGE: 82 year old female  YOB: 1940  MRN: [id number]  EVALUATION DATE & TIME: 5/16/2022 12:33 PM    PCP: Dede Pires    ED PROVIDER: Spring Reed M.D.      Chief Complaint   Patient presents with     Fall     Hip Pain         FINAL IMPRESSION:  1. Urinary tract infection without hematuria, site unspecified    2. Closed stable burst fracture of twelfth thoracic vertebra, initial encounter (H)    3. Fall, initial encounter          ED COURSE & MEDICAL DECISION MAKING:    ED Course as of 05/16/22 1605   Mon May 16, 2022   1257 Pt neurvascuarly intact with left hip pain after unwitnessed trip and fall, pain 8/10, given further fentanyl, CT head and back pending with pain to back and head trauma possible, Diaz and preop COVID`19 PCR and labs and EKG pending   1526 UA with UTI present, cipro begun with cultures pending (allergy to cephaloporins and PCN)   1528 CT thoracic spine with new t12 fracture but no lumbar or cervical fractures, CT head reassuringly normal, ortho/spine paged   1530 CT chest/abd/pelvis with chronic appearing L4 fracture, no pain there thus unlikely relating to her fall today   1530 XR without acute pelvic fracture. With pain after fall and T12 fracture and UTI and weakness overall, no beds available in [place] for admission per charge BOBBY Marquez, hospitalist paged for admission   1549 Patient endorsed to hospitalist Dr Jon to med surg       Pertinent Labs & Imaging studies reviewed. (See chart for details)    N95 worn  A face shield was worn also  COVID PPE    12:50 PM I met with the patient, obtained history, performed an initial exam, and discussed options and plan for diagnostics and treatment here in the ED.  3:48 PM I spoke with the hospitalist Dr. Jon who accepts the patient for admission.    At the conclusion of the encounter I discussed the results of all of the tests and the disposition. The questions were  answered. The patient or family acknowledged understanding and was agreeable with the care plan.     MEDICATIONS GIVEN IN THE EMERGENCY:  Medications   ciprofloxacin (CIPRO) infusion 400 mg (has no administration in time range)   ciprofloxacin (CIPRO) infusion 400 mg (has no administration in time range)   fentaNYL (PF) (SUBLIMAZE) injection 50 mcg (50 mcg Intravenous Given 5/16/22 1347)       NEW PRESCRIPTIONS STARTED AT TODAY'S ER VISIT  New Prescriptions    No medications on file          =================================================================    HPI  HPI limited secondary to dementia.    Mehreen Camara is a 82 year old female with PMHx of Lewy body dementia, frequent falls, traumatic injuries due to falls,  who presents to the ED today via EMS with fall. Per RN, the patient reports by EMS from her assisted living facility for a mechanical fall outside of her dining area. No blood thinners. The fall was unwitnessed, but the patient was found shortly after her fall. At that time, the patient was complaining of left sided hip pain. She has a bump on her left forehead, but unsure if this was new. Patient given fentanyl en route by EMS at 1220.    Patient currently endorses bilateral hip pain which she currently rates at 8/10. Pain is worse with movement. She denies knee pain. Patient states she had her right hip replaced in the past. She states she took her morning medications.      REVIEW OF SYSTEMS   ROS limited secondary to dementia.    PAST MEDICAL HISTORY:  Past Medical History:   Diagnosis Date     AAA (abdominal aortic aneurysm) (H)      Abdominal pain, epigastric 12/16/2013     Abnormal computed tomography scan 9/15/2016     Abnormal finding on imaging 9/15/2016     Acute encephalopathy 9/26/2015     Adjustment disorder with anxious mood 9/17/2018     Adjustment disorder with mixed anxiety and depressed mood 9/17/2018     Adrenal adenoma     stable, thought not to be hormonally active      Adrenal adenoma      Agitation      Anemia 11/18/2010     Aneurysm of abdominal vessel (H) 2/25/2008    Created by [employer] [facility] Annotation: Jun 8 2011  8:07AM Danni Grigsby: 4.4 on 11/2013.   Needs 6-12 month u/s or CT.  Replacement Utility updated for latest IMO load  Overview:  2/08 Abd US: 5.4 cm. 3/08 CT Abd: 2.7 x 3.3. 9/08:  MRI Abd: 2.7X3.2  Next 9/09.     Anxiety state 2/26/2008     Arthritis      Asymptomatic postmenopausal status     Created by Conversion  Replacement Utility updated for latest IMO load     Back pain 5/16/2020     Harris esophagus      Harris's esophagus 1/11/2012    Created by [employer] [facility] Annotation: Feb  3 2012  8:32Cameron Tejada: Needs EGD 2/2017   Overview:  Overview:  Created by [employer] [facility] Annotation: Feb  3 2012  8:32Cameron Tejada: Needs EGD 2/2017     Benign neoplasm of ascending colon 9/19/2016     Bilateral knee pain 8/5/2020     Bloating 5/5/2017     Bradycardia 9/17/2018     Cataract 9/17/2018    Created by [employer]   Overview:  Overview:  Created by Conversion     Chest wall pain 1/20/2013     Chronic low back pain 9/29/2016     Cognitive and behavioral changes      Compression fracture of lumbar vertebra (H) 10/10/2020     Constipation      Contact dermatitis and eczema 11/22/2006     DDD (degenerative disc disease), lumbosacral 2/26/2008     Depression      Depression, major 7/13/2009     Depressive disorder      Diaphragmatic hernia 9/18/2018    Created by Conversion  Replacement Utility updated for latest IMO load  Overview:  Overview:  Created by [employer]  Replacement Utility updated for latest IMO load     Dietary counseling and surveillance 9/15/2016     Disorder of bone and cartilage 9/17/2018    Created by [employer] [facility] Annotation: Dec 13 2012  7:41AM Roberta Goodwin: vit D/Ca, f/u  Dexa needed 1/2014.  Replacement Utility updated for latest IMO load  Overview:  Overview:  Created by [employer]  [facility] Annotation: Dec 13 2012  7:41Roberta Kelly: vit D/Ca, f/u  Dexa needed 1/2014.  Replacement Utility updated for latest IMO load     Diverticulosis of colon 12/18/2014     Dizziness and giddiness     Created by Conversion      Dysphagia 12/17/2013     Early satiety 12/16/2013     Esophageal reflux     Created by Conversion      Essential hypertension 11/22/2006    Created by Conversion  Replacement Utility updated for latest IMO load  Overview:  Overview:  Created by [employer]  Replacement Utility updated for latest IMO load     Fall 8/5/2020     Flatulence, eructation and gas pain 2/19/2014     Gaseous abdominal distention 9/19/2016     Gastro-esophageal reflux disease with esophagitis 6/6/2017     Generalized muscle weakness 7/4/2018     GERD (gastroesophageal reflux disease)      Hemorrhage of rectum and anus 9/19/2016     Hiatal hernia      HLD (hyperlipidemia)      Hoarseness of voice 4/6/2010     HTN (hypertension)      Hypertension      Hypokalemia 6/29/2007     Hypothyroid      Hypothyroidism 9/22/2009    Created by Conversion  Replacement Utility updated for latest IMO load  Overview:  Overview:  Created by Conversion  Replacement Utility updated for latest IMO load     Insomnia due to anxiety and fear 9/17/2018     Insomnia due to mental condition      Irritable bowel syndrome 11/22/2006     Lower back pain      Major depressive disorder, recurrent episode (H) 9/17/2018    Created by [employer]  Replacement Utility updated for latest IMO load  Overview:  Overview:  Created by Conversion  Replacement Utility updated for latest IMO load     Major depressive disorder, single episode, moderate (H)      Malaise and fatigue 7/10/2007     Metabolic encephalopathy      Migraine with aura      Mixed hyperlipidemia 2/10/2008    Created by Conversion      Mood disorder due to a general medical condition      Movement disorder 9/19/2018     Multiple system atrophy P (H)      Multiple system atrophy P (H)  11/14/2018     Murmur      Nonrheumatic aortic valve stenosis 10/23/2020     Nontoxic multinodular goiter 11/22/2006    Overview:  thyroidectomy 7/07 for atypical cells on FNA- benign cysts seen at time of surgical pathology     Obesity, unspecified     Created by Conversion      Orthostatic hypotension dysautonomic syndrome 9/22/2017     Osteoarthritis      Partial small bowel obstruction (H)      Polyp of colon 9/19/2016     Post-op pain 12/12/2018     Postoperative hypothyroidism 1/15/2013    Overview:  7/2/07-  Thyroidectomy by Dr Wang Crenshaw     Prediabetes 10/26/2010     Primary insomnia 7/4/2017     REM sleep behavior disorder 8/5/2020     Retinal migraine 4/28/2014     Rheumatic fever     thought to have had as a child     Rupture of left Achilles tendon, sequela 12/31/2019     S/P AAA repair using bifurcation graft 11/30/2015     S/P laparoscopic cholecystectomy 12/12/2018     Scoliosis      Sleep difficulties      Small bowel obstruction (H)      Thyroid disease      Thyroid nodule     removed nodules and thyroid     Tinnitus     Created by Conversion  Replacement Utility updated for latest IMO load     Undiagnosed cardiac murmurs 11/22/2006     Vitamin D deficiency 1/15/2013     Weak 4/29/2018     Weakness 5/4/2017     Weakness of both lower extremities 7/5/2018       PAST SURGICAL HISTORY:  Past Surgical History:   Procedure Laterality Date     AAA REPAIR  2015    wtih bifurcation graft     CARPAL TUNNEL RELEASE RT/LT Bilateral 2013     HC REVISE MEDIAN N/CARPAL TUNNEL SURG      Description: Neuroplasty Decompression Median Nerve At Carpal Tunnel;  Recorded: 01/21/2013;  Comments: bilateral     HYSTERECTOMY  2013     IR ABDOMINAL ENDOVASCULAR STENT GRAFT  11/30/2015     JOINT REPLACEMENT Right 2003     JOINT REPLACEMENT       LAPAROSCOPIC CHOLECYSTECTOMY N/A 12/12/2018    Procedure: LAPAROSCOPIC CHOLECYSTECTOMY;  Surgeon: Amadeo Sebastian MD;  Location: [facility]     ORTHOPEDIC SURGERY       GA THYROIDECTOMY       Description: Thyroid Surgery Total Thyroidectomy;  Recorded: 06/08/2011;     SPINE SURGERY  1981    cervical spine fusion     THYROIDECTOMY  2011     XR MAJOR JOINT OR BURSA INJ/ASP BILATERAL  5/18/2020     XR MAJOR JOINT OR BURSA INJ/ASP UNILATERAL  5/18/2020     [facility] CERV SPINE FUSN,ANTER,BELOW C2      Description: Cervical Vertebral Fusion;  Recorded: 01/21/2013;  Comments: 1981     [facility] TOTAL ABDOM HYSTERECTOMY  1985    Description: Hysterectomy;  Recorded: 01/21/2013;     [facility] TOTAL HIP ARTHROPLASTY Right     Description: Total Hip Replacement;  Recorded: 01/21/2013;  Comments: right, 2003       CURRENT MEDICATIONS:    acetaminophen (TYLENOL) 500 MG tablet  bisacodyl (DULCOLAX) 10 MG suppository  Buprenorphine HCl (BELBUCA) 150 MCG FILM buccal film  carbidopa-levodopa (SINEMET)  MG tablet  diclofenac (VOLTAREN) 1 % topical gel  DULoxetine (CYMBALTA) 60 MG capsule  gabapentin (NEURONTIN) 300 MG capsule  gabapentin (NEURONTIN) 300 MG capsule  hydrALAZINE (APRESOLINE) 10 MG tablet  lactobacillus rhamnosus, GG, (CULTURELL) capsule  levothyroxine (SYNTHROID/LEVOTHROID) 100 MCG tablet  lidocaine (LIDODERM) 5 % patch  losartan (COZAAR) 50 MG tablet  naloxone (NARCAN) 4 MG/0.1ML nasal spray  nystatin (MYCOSTATIN) 847425 UNIT/GM external cream  oxyCODONE (ROXICODONE) 5 MG tablet  polyethylene glycol (MIRALAX) 17 g packet  polyethylene glycol (MIRALAX) 17 GM/Dose powder  polyethylene glycol-propylene glycol (SYSTANE ULTRA) 0.4-0.3 % SOLN ophthalmic solution  polyethylene glycol-propylene glycol (SYSTANE ULTRA) 0.4-0.3 % SOLN ophthalmic solution  QUEtiapine (SEROQUEL) 25 MG tablet  senna-docusate (SENOKOT-S/PERICOLACE) 8.6-50 MG tablet  senna-docusate (SENOKOT-S/PERICOLACE) 8.6-50 MG tablet  simethicone (MYLICON) 125 MG chewable tablet  Simethicone 125 MG TABS  sodium phosphate (FLEET ENEMA) 7-19 GM/118ML rectal enema  vitamin D3 (CHOLECALCIFEROL) 50 mcg (2000 units) tablet        ALLERGIES:  Allergies   Allergen  "Reactions     Penicillins Anaphylaxis, Rash and Shortness Of Breath     Aspirin Nausea and GI Disturbance     Atenolol Cough     Atorvastatin Muscle Pain (Myalgia) and Nausea and Vomiting     Bupropion Nausea     Cephalexin Rash     Localized to neck area     Clindamycin Other (See Comments)     Constipation      Codeine Nausea     Ibuprofen Nausea and GI Disturbance     Lovastatin Muscle Pain (Myalgia)       FAMILY HISTORY:  Family History   Problem Relation Age of Onset     Hypertension Mother      Coronary Artery Disease Mother      Coronary Artery Disease Father      Other Cancer Brother      Parkinsonism Other      Heart Disease Mother      Heart Disease Father      Cancer Brother 57.00        colon     Hypertension Daughter      Hypertension Daughter      Neuropathy Daughter      Lupus Daughter      Heart Disease Paternal Aunt      Heart Disease Paternal Uncle      Parkinsonism Paternal Uncle        SOCIAL HISTORY:   Social History     Socioeconomic History     Marital status:    Tobacco Use     Smoking status: Former Smoker     Packs/day: 0.50     Types: Cigarettes     Quit date: 1994     Years since quittin.4     Smokeless tobacco: Never Used   Substance and Sexual Activity     Alcohol use: No     Drug use: No     Sexual activity: Not Currently       VITALS:  Patient Vitals for the past 24 hrs:   BP Temp Temp src Pulse Resp SpO2 Height Weight   22 1510 (!) 197/115 -- -- 99 23 93 % -- --   22 1400 (!) 212/101 -- -- 90 17 93 % -- --   22 1345 (!) 188/91 -- -- 81 17 94 % -- --   22 1246 (!) 169/79 97.3  F (36.3  C) Oral 87 19 97 % 1.676 m (5' 6\") 68 kg (150 lb)   22 1244 (!) 168/78 -- -- 89 -- 96 % -- --          PHYSICAL EXAM    VITAL SIGNS: BP (!) 197/115 (BP Location: Left arm, Patient Position: Semi-Sibley's, Cuff Size: Adult Regular)   Pulse 99   Temp 97.3  F (36.3  C) (Oral)   Resp 23   Ht 1.676 m (5' 6\")   Wt 68 kg (150 lb)   SpO2 93%   BMI 24.21 " kg/m     GENERAL: Awake, alert.  In no acute distress. GCS 15  HEENT: Left forehead swelling without bruising or deformity, mobile and soft, suggestive of lipoma. Normocephalic, atraumatic.  Pupils equal, round and reactive.  Conjunctiva normal.  EOMI. No coles sign, no racoon eyes, no mastoid tenderness, no hemotympanum, no facial instability, no nasal bridge pain, no nasal septal hematoma, no intraoral lacerations, no loose teeth, no mandible pain or deformity  NECK: Diffuse tenderness without deformity. No stridor or apparent deformity.  PULMONARY: Symmetrical breath sounds without distress.  Lungs clear to auscultation bilaterally without wheezes, rhonchi or rales.  CARDIO: Regular rate and rhythm.  No significant murmur, rub or gallop.  Radial pulses strong and symmetrical.  THORAX: No focal chest wall deformity or crepitus  BACK: No focal tenderness or deformity to each vertebral level in midline  ABDOMINAL: Abdomen soft, non-distended and non-tender to palpation.  No CVAT, BL.  GENITOURINARY: Adult diaper in place  EXTREMITIES: Right lateral hip tenderness without deformity or rotation, left hip substantial pain without deformity or rotation. No lower extremity swelling or edema. Bilateral pedal pulses 2+ and equal.  NEURO: Alert and oriented to person, place and time.  Cranial nerves grossly intact.  No focal motor deficit. Sensation globally intact.  PSYCH: Normal mood and affect  SKIN: Left knee total knee replacement scar appreciated. No rashes        LAB:  All pertinent labs reviewed and interpreted.  Results for orders placed or performed during the hospital encounter of 05/16/22   Cervical spine CT w/o contrast    Impression    IMPRESSION:  HEAD CT:  1.  No acute intracranial abnormality.  2.  Mild left forehead soft tissue contusion without underlying acute calvarial fracture.  3.  Mild to moderate global brain parenchymal volume loss with presumed sequelae of mild chronic small vessel ischemic  disease.    CERVICAL SPINE CT:  1.  No evidence for acute fracture or post traumatic subluxation of the cervical spine by CT imaging.  2.  No high-grade spinal canal stenosis.  3.  Severe degenerative neuroforaminal stenosis on the left at C3-C4 and C4-C5.    THORACIC SPINE CT:  1.  Acute burst-type compression fracture deformity of the T12 vertebral body with 50% height loss and 3 mm retropulsion. No posttraumatic spinal canal stenosis or segmental kyphosis.  2.  No high-grade spinal canal or neuroforaminal stenosis.    LUMBAR SPINE CT:  1.  No evidence for acute fracture or post traumatic subluxation of the lumbar spine by CT imaging.  2.  No high-grade spinal canal or neuroforaminal stenosis.       Head CT w/o contrast    Impression    IMPRESSION:  HEAD CT:  1.  No acute intracranial abnormality.  2.  Mild left forehead soft tissue contusion without underlying acute calvarial fracture.  3.  Mild to moderate global brain parenchymal volume loss with presumed sequelae of mild chronic small vessel ischemic disease.    CERVICAL SPINE CT:  1.  No evidence for acute fracture or post traumatic subluxation of the cervical spine by CT imaging.  2.  No high-grade spinal canal stenosis.  3.  Severe degenerative neuroforaminal stenosis on the left at C3-C4 and C4-C5.    THORACIC SPINE CT:  1.  Acute burst-type compression fracture deformity of the T12 vertebral body with 50% height loss and 3 mm retropulsion. No posttraumatic spinal canal stenosis or segmental kyphosis.  2.  No high-grade spinal canal or neuroforaminal stenosis.    LUMBAR SPINE CT:  1.  No evidence for acute fracture or post traumatic subluxation of the lumbar spine by CT imaging.  2.  No high-grade spinal canal or neuroforaminal stenosis.       CT Thoracic Spine w/o Contrast    Impression    IMPRESSION:  HEAD CT:  1.  No acute intracranial abnormality.  2.  Mild left forehead soft tissue contusion without underlying acute calvarial fracture.  3.  Mild to  moderate global brain parenchymal volume loss with presumed sequelae of mild chronic small vessel ischemic disease.    CERVICAL SPINE CT:  1.  No evidence for acute fracture or post traumatic subluxation of the cervical spine by CT imaging.  2.  No high-grade spinal canal stenosis.  3.  Severe degenerative neuroforaminal stenosis on the left at C3-C4 and C4-C5.    THORACIC SPINE CT:  1.  Acute burst-type compression fracture deformity of the T12 vertebral body with 50% height loss and 3 mm retropulsion. No posttraumatic spinal canal stenosis or segmental kyphosis.  2.  No high-grade spinal canal or neuroforaminal stenosis.    LUMBAR SPINE CT:  1.  No evidence for acute fracture or post traumatic subluxation of the lumbar spine by CT imaging.  2.  No high-grade spinal canal or neuroforaminal stenosis.       Lumbar spine CT w/o contrast    Impression    IMPRESSION:  HEAD CT:  1.  No acute intracranial abnormality.  2.  Mild left forehead soft tissue contusion without underlying acute calvarial fracture.  3.  Mild to moderate global brain parenchymal volume loss with presumed sequelae of mild chronic small vessel ischemic disease.    CERVICAL SPINE CT:  1.  No evidence for acute fracture or post traumatic subluxation of the cervical spine by CT imaging.  2.  No high-grade spinal canal stenosis.  3.  Severe degenerative neuroforaminal stenosis on the left at C3-C4 and C4-C5.    THORACIC SPINE CT:  1.  Acute burst-type compression fracture deformity of the T12 vertebral body with 50% height loss and 3 mm retropulsion. No posttraumatic spinal canal stenosis or segmental kyphosis.  2.  No high-grade spinal canal or neuroforaminal stenosis.    LUMBAR SPINE CT:  1.  No evidence for acute fracture or post traumatic subluxation of the lumbar spine by CT imaging.  2.  No high-grade spinal canal or neuroforaminal stenosis.       CT Chest Abdomen Pelvis w/o Contrast    Impression    IMPRESSION:  1.  Slightly comminuted acute  compression fracture T12 vertebral body.  2.  No additional acute findings identified.  3.  Chronic compression fracture superior endplate L4 vertebral body and healed bilateral pubic rami and sacral alar fractures.     XR Femur Left 2 Views    Impression    IMPRESSION:  1. Old healed pelvic fractures partially included in the field-of-view.  2. Total knee arthroplasty.  3. Arterial calcifications.  4. There is no evidence of a femur fracture or osteonecrosis.   XR Knee Left 1/2 Views    Impression    IMPRESSION: Left total knee arthroplasty with patellar resurfacing. Components project in the expected position. No acute displaced periprosthetic fracture or finding for component failure. No significant left knee joint effusion. Distal left quadriceps   insertional enthesopathy at the patella. Arterial calcification.   XR Pelvis 1/2 Views    Impression    IMPRESSION:   1. Old healed sacral and pubic ramus fractures.  2. Right total hip arthroplasty and vascular stents.  3. Diffuse bone demineralization. No evidence of an acute fracture.   Basic metabolic panel   Result Value Ref Range    Sodium 143 136 - 145 mmol/L    Potassium 4.1 3.5 - 5.0 mmol/L    Chloride 105 98 - 107 mmol/L    Carbon Dioxide (CO2) 29 22 - 31 mmol/L    Anion Gap 9 5 - 18 mmol/L    Urea Nitrogen 18 8 - 28 mg/dL    Creatinine 0.69 0.60 - 1.10 mg/dL    Calcium 9.2 8.5 - 10.5 mg/dL    Glucose 124 70 - 125 mg/dL    GFR Estimate 86 >60 mL/min/1.73m2   UA with Microscopic reflex to Culture    Specimen: Urine, Diaz Catheter   Result Value Ref Range    Color Urine Yellow Colorless, Straw, Light Yellow, Yellow    Appearance Urine Turbid (A) Clear    Glucose Urine Negative Negative mg/dL    Bilirubin Urine Negative Negative    Ketones Urine Trace (A) Negative mg/dL    Specific Gravity Urine 1.024 1.001 - 1.030    Blood Urine Negative Negative    pH Urine 6.5 5.0 - 7.0    Protein Albumin Urine 20  (A) Negative mg/dL    Urobilinogen Urine 2.0 (A) <2.0 mg/dL     Nitrite Urine Negative Negative    Leukocyte Esterase Urine Negative Negative    Bacteria Urine Few (A) None Seen /HPF    Budding Yeast Urine Few (A) None Seen /HPF    Mucus Urine Present (A) None Seen /LPF    RBC Urine 4 (H) <=2 /HPF    WBC Urine 12 (H) <=5 /HPF    Squamous Epithelials Urine 7 (H) <=1 /HPF    Hyaline Casts Urine 7 (H) <=2 /LPF   Asymptomatic COVID-19 Virus (Coronavirus) by PCR Nasopharyngeal    Specimen: Nasopharyngeal; Swab   Result Value Ref Range    SARS CoV2 PCR Negative Negative   CBC with platelets and differential   Result Value Ref Range    WBC Count 4.6 4.0 - 11.0 10e3/uL    RBC Count 4.16 3.80 - 5.20 10e6/uL    Hemoglobin 12.8 11.7 - 15.7 g/dL    Hematocrit 40.7 35.0 - 47.0 %    MCV 98 78 - 100 fL    MCH 30.8 26.5 - 33.0 pg    MCHC 31.4 (L) 31.5 - 36.5 g/dL    RDW 12.0 10.0 - 15.0 %    Platelet Count 205 150 - 450 10e3/uL    % Neutrophils 69 %    % Lymphocytes 21 %    % Monocytes 8 %    % Eosinophils 1 %    % Basophils 1 %    % Immature Granulocytes 0 %    NRBCs per 100 WBC 0 <1 /100    Absolute Neutrophils 3.2 1.6 - 8.3 10e3/uL    Absolute Lymphocytes 1.0 0.8 - 5.3 10e3/uL    Absolute Monocytes 0.4 0.0 - 1.3 10e3/uL    Absolute Eosinophils 0.0 0.0 - 0.7 10e3/uL    Absolute Basophils 0.0 0.0 - 0.2 10e3/uL    Absolute Immature Granulocytes 0.0 <=0.4 10e3/uL    Absolute NRBCs 0.0 10e3/uL   ECG 12-LEAD WITH MUSE (LHE)   Result Value Ref Range    Systolic Blood Pressure 143 mmHg    Diastolic Blood Pressure 60 mmHg    Ventricular Rate 88 BPM    Atrial Rate 88 BPM    MO Interval 178 ms    QRS Duration 100 ms     ms    QTc 440 ms    P Axis 82 degrees    R AXIS 3 degrees    T Axis 89 degrees    Interpretation ECG       Sinus rhythm  Possible Left atrial enlargement  Inferior infarct (cited on or before 25-SEP-2015)  Anterior infarct (cited on or before 25-SEP-2015)  Abnormal ECG  When compared with ECG of 24-DEC-2020 15:45,  Questionable change in initial forces of Septal  leads  Confirmed by SEE ED PROVIDER NOTE FOR, ECG INTERPRETATION (4000),  JONI LAROSE ([id number]) on 5/16/2022 1:05:37 PM         RADIOLOGY:  Reviewed all pertinent imaging. Please see official radiology report.  Lumbar spine CT w/o contrast   Final Result   IMPRESSION:   HEAD CT:   1.  No acute intracranial abnormality.   2.  Mild left forehead soft tissue contusion without underlying acute calvarial fracture.   3.  Mild to moderate global brain parenchymal volume loss with presumed sequelae of mild chronic small vessel ischemic disease.      CERVICAL SPINE CT:   1.  No evidence for acute fracture or post traumatic subluxation of the cervical spine by CT imaging.   2.  No high-grade spinal canal stenosis.   3.  Severe degenerative neuroforaminal stenosis on the left at C3-C4 and C4-C5.      THORACIC SPINE CT:   1.  Acute burst-type compression fracture deformity of the T12 vertebral body with 50% height loss and 3 mm retropulsion. No posttraumatic spinal canal stenosis or segmental kyphosis.   2.  No high-grade spinal canal or neuroforaminal stenosis.      LUMBAR SPINE CT:   1.  No evidence for acute fracture or post traumatic subluxation of the lumbar spine by CT imaging.   2.  No high-grade spinal canal or neuroforaminal stenosis.            CT Thoracic Spine w/o Contrast   Final Result   IMPRESSION:   HEAD CT:   1.  No acute intracranial abnormality.   2.  Mild left forehead soft tissue contusion without underlying acute calvarial fracture.   3.  Mild to moderate global brain parenchymal volume loss with presumed sequelae of mild chronic small vessel ischemic disease.      CERVICAL SPINE CT:   1.  No evidence for acute fracture or post traumatic subluxation of the cervical spine by CT imaging.   2.  No high-grade spinal canal stenosis.   3.  Severe degenerative neuroforaminal stenosis on the left at C3-C4 and C4-C5.      THORACIC SPINE CT:   1.  Acute burst-type compression fracture deformity of the T12  vertebral body with 50% height loss and 3 mm retropulsion. No posttraumatic spinal canal stenosis or segmental kyphosis.   2.  No high-grade spinal canal or neuroforaminal stenosis.      LUMBAR SPINE CT:   1.  No evidence for acute fracture or post traumatic subluxation of the lumbar spine by CT imaging.   2.  No high-grade spinal canal or neuroforaminal stenosis.            CT Chest Abdomen Pelvis w/o Contrast   Final Result   IMPRESSION:   1.  Slightly comminuted acute compression fracture T12 vertebral body.   2.  No additional acute findings identified.   3.  Chronic compression fracture superior endplate L4 vertebral body and healed bilateral pubic rami and sacral alar fractures.        Cervical spine CT w/o contrast   Final Result   IMPRESSION:   HEAD CT:   1.  No acute intracranial abnormality.   2.  Mild left forehead soft tissue contusion without underlying acute calvarial fracture.   3.  Mild to moderate global brain parenchymal volume loss with presumed sequelae of mild chronic small vessel ischemic disease.      CERVICAL SPINE CT:   1.  No evidence for acute fracture or post traumatic subluxation of the cervical spine by CT imaging.   2.  No high-grade spinal canal stenosis.   3.  Severe degenerative neuroforaminal stenosis on the left at C3-C4 and C4-C5.      THORACIC SPINE CT:   1.  Acute burst-type compression fracture deformity of the T12 vertebral body with 50% height loss and 3 mm retropulsion. No posttraumatic spinal canal stenosis or segmental kyphosis.   2.  No high-grade spinal canal or neuroforaminal stenosis.      LUMBAR SPINE CT:   1.  No evidence for acute fracture or post traumatic subluxation of the lumbar spine by CT imaging.   2.  No high-grade spinal canal or neuroforaminal stenosis.            Head CT w/o contrast   Final Result   IMPRESSION:   HEAD CT:   1.  No acute intracranial abnormality.   2.  Mild left forehead soft tissue contusion without underlying acute calvarial fracture.    3.  Mild to moderate global brain parenchymal volume loss with presumed sequelae of mild chronic small vessel ischemic disease.      CERVICAL SPINE CT:   1.  No evidence for acute fracture or post traumatic subluxation of the cervical spine by CT imaging.   2.  No high-grade spinal canal stenosis.   3.  Severe degenerative neuroforaminal stenosis on the left at C3-C4 and C4-C5.      THORACIC SPINE CT:   1.  Acute burst-type compression fracture deformity of the T12 vertebral body with 50% height loss and 3 mm retropulsion. No posttraumatic spinal canal stenosis or segmental kyphosis.   2.  No high-grade spinal canal or neuroforaminal stenosis.      LUMBAR SPINE CT:   1.  No evidence for acute fracture or post traumatic subluxation of the lumbar spine by CT imaging.   2.  No high-grade spinal canal or neuroforaminal stenosis.            XR Pelvis 1/2 Views   Final Result   IMPRESSION:    1. Old healed sacral and pubic ramus fractures.   2. Right total hip arthroplasty and vascular stents.   3. Diffuse bone demineralization. No evidence of an acute fracture.      XR Femur Left 2 Views   Final Result   IMPRESSION:   1. Old healed pelvic fractures partially included in the field-of-view.   2. Total knee arthroplasty.   3. Arterial calcifications.   4. There is no evidence of a femur fracture or osteonecrosis.      XR Knee Left 1/2 Views   Final Result   IMPRESSION: Left total knee arthroplasty with patellar resurfacing. Components project in the expected position. No acute displaced periprosthetic fracture or finding for component failure. No significant left knee joint effusion. Distal left quadriceps    insertional enthesopathy at the patella. Arterial calcification.            EKG:    Reviewed and interpreted as: Performed at 13:05:13. Sinus rhythm, 88 bpm, no ST changes. No significant change was found when compared with EKG of 24-DEC-2020.      I have independently reviewed and interpreted the EKG(s) documented  above.        I, Telly Andrade, am serving as a scribe to document services personally performed by Dr. Spring Reed based on my observation and the provider's statements to me. I, Spring Reed MD attest that Telly Andrade is acting in a scribe capacity, has observed my performance of the services and has documented them in accordance with my direction.     Spring Reed MD  05/16/22 1371

## 2022-05-17 PROCEDURE — 120N000001 HC R&B MED SURG/OB

## 2022-05-17 PROCEDURE — 250N000013 HC RX MED GY IP 250 OP 250 PS 637: Performed by: NURSE PRACTITIONER

## 2022-05-17 PROCEDURE — 250N000011 HC RX IP 250 OP 636: Performed by: HOSPITALIST

## 2022-05-17 PROCEDURE — 99232 SBSQ HOSP IP/OBS MODERATE 35: CPT | Performed by: HOSPITALIST

## 2022-05-17 PROCEDURE — 250N000013 HC RX MED GY IP 250 OP 250 PS 637: Performed by: HOSPITALIST

## 2022-05-17 PROCEDURE — 250N000009 HC RX 250: Performed by: NURSE PRACTITIONER

## 2022-05-17 PROCEDURE — 258N000003 HC RX IP 258 OP 636: Performed by: HOSPITALIST

## 2022-05-17 PROCEDURE — 99222 1ST HOSP IP/OBS MODERATE 55: CPT | Performed by: NURSE PRACTITIONER

## 2022-05-17 RX ORDER — BENZTROPINE MESYLATE 0.5 MG/1
1 TABLET ORAL 3 TIMES DAILY PRN
Status: DISCONTINUED | OUTPATIENT
Start: 2022-05-17 | End: 2022-05-25 | Stop reason: HOSPADM

## 2022-05-17 RX ORDER — LIDOCAINE 50 MG/G
OINTMENT TOPICAL 4 TIMES DAILY
Status: DISCONTINUED | OUTPATIENT
Start: 2022-05-17 | End: 2022-05-25 | Stop reason: HOSPADM

## 2022-05-17 RX ADMIN — Medication 1 CAPSULE: at 13:18

## 2022-05-17 RX ADMIN — POLYETHYLENE GLYCOL AND PROPYLENE GLYCOL 2 DROP: 4; 3 SOLUTION/ DROPS OPHTHALMIC at 21:59

## 2022-05-17 RX ADMIN — ACETAMINOPHEN 1000 MG: 500 TABLET ORAL at 20:35

## 2022-05-17 RX ADMIN — CIPROFLOXACIN 400 MG: 2 INJECTION, SOLUTION INTRAVENOUS at 17:05

## 2022-05-17 RX ADMIN — CIPROFLOXACIN 400 MG: 2 INJECTION, SOLUTION INTRAVENOUS at 04:11

## 2022-05-17 RX ADMIN — LIDOCAINE: 50 OINTMENT TOPICAL at 20:36

## 2022-05-17 RX ADMIN — ACETAMINOPHEN 1000 MG: 500 TABLET ORAL at 13:20

## 2022-05-17 RX ADMIN — LEVOTHYROXINE SODIUM 100 MCG: 0.05 TABLET ORAL at 07:18

## 2022-05-17 RX ADMIN — Medication 50 MCG: at 13:12

## 2022-05-17 RX ADMIN — SIMETHICONE 125 MG: 125 TABLET, CHEWABLE ORAL at 13:12

## 2022-05-17 RX ADMIN — GABAPENTIN 300 MG: 300 CAPSULE ORAL at 17:48

## 2022-05-17 RX ADMIN — LOSARTAN POTASSIUM 50 MG: 50 TABLET, FILM COATED ORAL at 13:15

## 2022-05-17 RX ADMIN — CARBIDOPA AND LEVODOPA 1 TABLET: 25; 100 TABLET ORAL at 20:34

## 2022-05-17 RX ADMIN — Medication 5 MG: at 13:18

## 2022-05-17 RX ADMIN — QUETIAPINE FUMARATE 25 MG: 25 TABLET ORAL at 21:57

## 2022-05-17 RX ADMIN — GABAPENTIN 300 MG: 300 CAPSULE ORAL at 13:15

## 2022-05-17 RX ADMIN — ACETAMINOPHEN 1000 MG: 500 TABLET ORAL at 13:16

## 2022-05-17 RX ADMIN — SIMETHICONE 125 MG: 125 TABLET, CHEWABLE ORAL at 21:58

## 2022-05-17 RX ADMIN — BUPRENORPHINE HYDROCHLORIDE 150 MCG: 150 FILM, SOLUBLE BUCCAL at 23:48

## 2022-05-17 RX ADMIN — DULOXETINE HYDROCHLORIDE 60 MG: 60 CAPSULE, DELAYED RELEASE ORAL at 13:14

## 2022-05-17 RX ADMIN — POLYETHYLENE GLYCOL 3350 17 G: 17 POWDER, FOR SOLUTION ORAL at 17:53

## 2022-05-17 RX ADMIN — BUPRENORPHINE HYDROCHLORIDE 150 MCG: 150 FILM, SOLUBLE BUCCAL at 13:14

## 2022-05-17 RX ADMIN — GABAPENTIN 600 MG: 300 CAPSULE ORAL at 21:55

## 2022-05-17 RX ADMIN — SENNOSIDES AND DOCUSATE SODIUM 1 TABLET: 50; 8.6 TABLET ORAL at 17:48

## 2022-05-17 RX ADMIN — SODIUM CHLORIDE: 9 INJECTION, SOLUTION INTRAVENOUS at 15:10

## 2022-05-17 RX ADMIN — CARBIDOPA AND LEVODOPA 1 TABLET: 25; 100 TABLET ORAL at 13:14

## 2022-05-17 ASSESSMENT — ACTIVITIES OF DAILY LIVING (ADL)
WALKING_OR_CLIMBING_STAIRS_DIFFICULTY: YES
CONCENTRATING,_REMEMBERING_OR_MAKING_DECISIONS_DIFFICULTY: OTHER (SEE COMMENTS)
ADLS_ACUITY_SCORE: 35
ADLS_ACUITY_SCORE: 41
DIFFICULTY_EATING/SWALLOWING: NO
ADLS_ACUITY_SCORE: 41
ADLS_ACUITY_SCORE: 41
ADLS_ACUITY_SCORE: 35
DRESSING/BATHING: DRESSING DIFFICULTY, ASSISTANCE 1 PERSON
WALKING_OR_CLIMBING_STAIRS: AMBULATION DIFFICULTY, REQUIRES EQUIPMENT
TOILETING_ASSISTANCE: TOILETING DIFFICULTY, REQUIRES EQUIPMENT
ADLS_ACUITY_SCORE: 41
ADLS_ACUITY_SCORE: 41
WEAR_GLASSES_OR_BLIND: YES
TOILETING_ISSUES: YES
ADLS_ACUITY_SCORE: 41
ADLS_ACUITY_SCORE: 41
DIFFICULTY_COMMUNICATING: NO
ADLS_ACUITY_SCORE: 41
DRESSING/BATHING_DIFFICULTY: YES

## 2022-05-17 NOTE — UTILIZATION REVIEW
Inpatient appropriate  Admission Status; Secondary Review Determination     Under the authority of the Utilization Management Committee, the utilization review process indicated a secondary review on the above patient. The review outcome is based on review of the medical records, discussions with staff, and applying clinical experience noted on the date of the review.     (x) Inpatient Status Appropriate - This patient's medical care is consistent with medical management for inpatient care and reasonable inpatient medical practice.     RATIONALE FOR DETERMINATION   83 years old female evaluated in the ED on 5/16/2022 for evaluation of an unwitnessed fall.  Past medical history of Parkinson's disease, multiple system atrophy, Lewy body dementia, chronic right shoulder pain, opiate tolerance, hypertension.  CT head and spine most remarkable for acute burst type compression fracture deformity of the T12 vertebral body with 50% height loss and 3 mm retropulsion.  Challenging pain control due to opiate tolerance on buprenorphine.  Overnight, patient had acute delirium requiring treatment with lorazepam.  Today, patient is somnolent.  At the time of admission with the information available to the attending physician more than 2 nights Hospital complex care was anticipated, based on patient risk of adverse outcome if treated as outpatient and complex care required. Inpatient admission is appropriate based on the Medicare guidelines.     This document was produced using voice recognition software     The information on this document is developed by the utilization review team in order for the business office to ensure compliance. This only denotes the appropriateness of proper admission status and does not reflect the quality of care rendered.   The definitions of Inpatient Status and Observation Status used in making the determination above are those provided in the CMS Coverage Manual, Chapter 1 and Chapter 6, section  70.4.     Sincerely,     Ovi Wilder MD  [facility]  Utilization Review Physician Advisor  Pager: [phone]

## 2022-05-17 NOTE — PROGRESS NOTES
[facility] Medicine PROGRESS NOTE      Identification/Summary:   Mehreen Camara is a 82 year old old female who presented to the emergency department for fall at her nursing facility.  ED evaluation with T12 compression fracture.  Patient was too painful to sit up or ambulate.  Other imaging was negative for acute fracture.  She has a history of Lewy body dementia, multiple system atrophy.     Nl vitals overnight.  Did have some agitation, delirium last night.  Given 1 dose of Ativan, already takes Neurontin and Seroquel at bedtime.     Assessment and Plan:  Unwitnessed fall with T12 compression fracture  She is opiate tolerant  Scheduled Tylenol, higher dose oxycodone as needed, PT and OT evaluations  Spine consult ordered in ED to give further recommendations  IV Dilaudid as needed for breakthrough pain  Monitor neuro status of lower extremities     Lewy body dementia  Parkinson's disease? Multiple system atrophy  Chronic pain with opiate tolerance  Acute metabolic encephalopathy, delirium  As an outpatient she takes buprenorphine twice daily, Sinemet, duloxetine, gabapentin, oxycodone, Seroquel  Continue her usual outpatient medications but increase the dose of oxycodone due to acute pain  Consult pain team  Increased sinemet to tid, pt and dtr notice improvement with higher dose, recently decreased     Essential hypertension  Blood pressure quite elevated initially, improved now  As an outpatient she takes losartan, hydralazine, will continue these     Chronic right shoulder pain     Abnormal urinalysis  Difficult to assess for symptoms, white count only slightly elevated and urine  She does report some increased urinary frequency  Continue ciprofloxacin as started in emergency department, follow urine culture    Systolic murmur  Mild aortic stenosis in 2018  Can have this further evaluated as an outpatient, no recent echo in our system     DVT proph: Mechanical  Code Status: DNR as listed by  several previous hospital stays  Disposition: Observation   Diet: Regular  Pain tx: Tylenol, oxycodone, buprenorphine  PT/OT: Both ordered    Interval History/Subjective:  Very sleepy this morning, seemed to elect to not answer my questions.    Physical Exam/Objective:  Gen: no acute distress, comfortable, asleep  ENT: no scleral icterus  Pulm: lungs are clear without wheezing, crackles  CV: regular rate and rhythm, systolic murmur, no pitting edema  GI: abdomen is soft, non-tender, non-distended with active bowel sounds.  Derm: no rashes on examined areas of skin, no jaundice, skin is dry and warm.   Psych: Calm, sleepy    Medications:     acetaminophen  1,000 mg Oral TID     Buprenorphine HCl  150 mcg Buccal BID     carbidopa-levodopa  1 tablet Oral TID     ciprofloxacin  400 mg Intravenous Q12H     DULoxetine  60 mg Oral Daily     gabapentin  300 mg Oral BID     gabapentin  600 mg Oral At Bedtime     hydrALAZINE  5 mg Oral BID     lactobacillus rhamnosus (GG)  1 capsule Oral Daily     levothyroxine  100 mcg Oral Daily     losartan  50 mg Oral BID     nystatin   Topical BID     polyethylene glycol  17 g Oral Daily     polyethylene glycol-propylene glycol PF  2 drop Both Eyes TID     QUEtiapine  25 mg Oral At Bedtime     senna-docusate  1 tablet Oral Daily     simethicone  125 mg Oral BID     vitamin D3  50 mcg Oral Daily       Scot Jon DO   Hospitalist  [facility][1]

## 2022-05-17 NOTE — CONSULTS
[facility] ACUTE PAIN SERVICE CONSULTATION     Date of Admission:  5/16/2022  Date of Consult (When I saw the patient): 05/17/22  Physician requesting consult: Dr Scot Jon  Reason for consult: Acute on chronic pain, on Belbuca  Primary Care Physician: Dede Pires APRN CNP     Assessment/Plan:     Mehreen Camara is a 82 year old female who was admitted on 5/16/2022.  Pain team was asked to see the patient for acute on chronic pain, patient on Belbuca chronically. Admitted for left-sided hip pain status post mechanical fall at her assisted living facility. History of Lewy body dementia, falls, traumatic injuries due to falls.  Describes pain as asleep/10-patient sleeping at time of visit, patient did awake to calling out her name briefly but then fell back asleep, RASS -2.  Much of history obtained through chart review. The patient does not smoke and denies chemical dependency history.     Did have some agitation, delirium last night.  Given 1 dose of Ativan, also takes takes Neurontin and Seroquel at bedtime.    Allergies: Aspirin, bupropion, codeine, ibuprofen    Opioid Induced Respiratory Depression Risk Assessment:?High  (Low 0-1; Moderate 2-4; or High >4 or >/= 3 if two of the risk factors are age > 60 and opioid naive) due to the following risk factors: HARIS, COPD/Asthma/pulmonary disease, CHF, renal dysfunction, hepatic dysfunction, Obesity, Smoker, Age>60, >2 opioid therapies, concomitant CNS depressants, opioid naive status, or post surgical ?     PLAN:   1) Pain is consistent with fracture pain, chronic pain. Labs and imaging indicated: Urinalysis indicates UTI, CT thoracic spine with acute T12 fracture, CT chest abdomen pelvis with chronic appearing L4 fracture, x-ray without acute pelvic fracture, CT head with no acute intracranial abnormality, CT cervical spine with no evidence for acute fracture or posttraumatic subluxation, CT lumbar spine no evidence of acute fracture. Treatment  plan includes multimodal pain approach, ice, PT, OT, Spine surgery consult. Patient educated regarding Multimodal pain approach, medications as listed below. Patient is understanding of the plan. All questions and concerns addressed to patient's satisfaction.   2)Multimodal Medication Therapy  Topical: Lidocaine ointment 4 times daily  NSAID'S: CrCl 67.5.  None -allergies noted to aspirin, ibuprofen  Muscle Relaxants: None  Adjuvants: APAP TID, gabapentin [phone]  Antidepressants/anxiolytics: Duloxetine 60 mg daily, carbidopa levodopa  mg 3 times daily, Seroquel 25 mg at bedtime  Opioids: Buprenorphine buccal film 150 mcg twice daily, oxycodone 5 to 10 mg every 4 hours as needed  IV Pain medication: None   3)Non-medication interventions: Ice  Acupuncture consult - offered and declined  Integrative consult - offered and declined  4)Constipation Prophylaxis: Scheduled and prn: MiraLAX daily, senna docusate daily, as needed sup, as needed MiraLAX, as needed senna docusate, as needed Fleet enema  5) Care Teams: Hospital Medicine Service, spine, pain    -Opioid prescriber has been at [facility] pulled from system on 5/17/2022. This indicates   5/5/2022 Belbuca 150 mcg film #28  4/14/2022 Belbuca 75 mcg film #60  4/1/2022 gabapentin 300 mg #112  3/9/2022 Belbuca 75 mcg film #60  Has had fills for oxycodone 5 mg tablets in June, July, September, November 2021  Discharge Recommendations - We recommend prescribing the following at the time of discharge: Small supply oxycodone 10 tabs, APAP, topicals if effective for acute pain. Intranasal Naloxone recommended and has script per Med recc review. Follow up Primary Care Provider, pain clinic      History of Present Illness (HPI):       Mehreen Camara is a 82 year old old female who presented after an unwitnessed fall at her assisted living facility. Was found to have acute T12 fracture on ED evaluation.  Past medical  history as above. The pain is reported to be acute, chronic, located in the low back. Denies nausea, vomiting, diarrhea, chest pain, shortness of breath.   Has chronic pain and is on buprenorphine, oxycodone, gabapentin, Cymbalta.  Chronic pain is typically in her back.     Per [place]  review, the patient does  have an opioid tolerance. Opioid induced side effects noted and include: sedation, constipation.     Reviewed medical record, labs, imaging, ED note, and care everywhere.     Past pain treatments have included: oxycodone, APAP, buprenorphine       Home pain medications/psych medications/anticoagulation medications include: APAP, Belbuca, Sinemet, Voltaren, Gabapentin, Cymbalta, Seroquel     Medical History   PAST MEDICAL HISTORY:   Past Medical History:   Diagnosis Date     AAA (abdominal aortic aneurysm) (H)      Abdominal pain, epigastric 12/16/2013     Abnormal computed tomography scan 9/15/2016     Abnormal finding on imaging 9/15/2016     Acute encephalopathy 9/26/2015     Adjustment disorder with anxious mood 9/17/2018     Adjustment disorder with mixed anxiety and depressed mood 9/17/2018     Adrenal adenoma     stable, thought not to be hormonally active     Adrenal adenoma      Agitation      Anemia 11/18/2010     Aneurysm of abdominal vessel (H) 2/25/2008    Created by [employer] Annotation: Jun 8 2011  8:07AM - Danni Ortiz: 4.4 on 11/2013.   Needs 6-12 month u/s or CT.  Replacement Utility updated for latest IMO load  Overview:  2/08 Abd US: 5.4 cm. 3/08 CT Abd: 2.7 x 3.3. 9/08:  MRI Abd: 2.7X3.2  Next 9/09.     Anxiety state 2/26/2008     Arthritis      Asymptomatic postmenopausal status     Created by Conversion  Replacement Utility updated for latest IMO load     Back pain 5/16/2020     Harris esophagus      Harris's esophagus 1/11/2012    Created by [employer] Annotation: Feb  3 2012  8:32AM - Cameron Obando: Needs EGD 2/2017   Overview:  Overview:  Created by  [facility] Annotation: Feb  3 2012  8:32NURY Obando Cameron: Needs EGD 2/2017     Benign neoplasm of ascending colon 9/19/2016     Bilateral knee pain 8/5/2020     Bloating 5/5/2017     Bradycardia 9/17/2018     Cataract 9/17/2018    Created by Conversion   Overview:  Overview:  Created by Conversion     Chest wall pain 1/20/2013     Chronic low back pain 9/29/2016     Cognitive and behavioral changes      Compression fracture of lumbar vertebra (H) 10/10/2020     Constipation      Contact dermatitis and eczema 11/22/2006     DDD (degenerative disc disease), lumbosacral 2/26/2008     Depression      Depression, major 7/13/2009     Depressive disorder      Diaphragmatic hernia 9/18/2018    Created by Conversion  Replacement Utility updated for latest IMO load  Overview:  Overview:  Created by Conversion  Replacement Utility updated for latest IMO load     Dietary counseling and surveillance 9/15/2016     Disorder of bone and cartilage 9/17/2018    Created by [facility] Annotation: Dec 13 2012  7:41Roberta Kelly: vit D/Ca, f/u  Dexa needed 1/2014.  Replacement Utility updated for latest IMO load  Overview:  Overview:  Created by [facility] Annotation: Dec 13 2012  7:41Roberta Kelly: vit D/Ca, f/u  Dexa needed 1/2014.  Replacement Utility updated for latest IMO load     Diverticulosis of colon 12/18/2014     Dizziness and giddiness     Created by Conversion      Dysphagia 12/17/2013     Early satiety 12/16/2013     Esophageal reflux     Created by Conversion      Essential hypertension 11/22/2006    Created by Conversion  Replacement Utility updated for latest IMO load  Overview:  Overview:  Created by Conversion  Replacement Utility updated for latest IMO load     Fall 8/5/2020     Flatulence, eructation and gas pain 2/19/2014     Gaseous abdominal distention 9/19/2016     Gastro-esophageal reflux disease with esophagitis 6/6/2017     Generalized muscle weakness  7/4/2018     GERD (gastroesophageal reflux disease)      Hemorrhage of rectum and anus 9/19/2016     Hiatal hernia      HLD (hyperlipidemia)      Hoarseness of voice 4/6/2010     HTN (hypertension)      Hypertension      Hypokalemia 6/29/2007     Hypothyroid      Hypothyroidism 9/22/2009    Created by Conversion  Replacement Utility updated for latest IMO load  Overview:  Overview:  Created by Conversion  Replacement Utility updated for latest IMO load     Insomnia due to anxiety and fear 9/17/2018     Insomnia due to mental condition      Irritable bowel syndrome 11/22/2006     Lower back pain      Major depressive disorder, recurrent episode (H) 9/17/2018    Created by Conversion  Replacement Utility updated for latest IMO load  Overview:  Overview:  Created by Conversion  Replacement Utility updated for latest IMO load     Major depressive disorder, single episode, moderate (H)      Malaise and fatigue 7/10/2007     Metabolic encephalopathy      Migraine with aura      Mixed hyperlipidemia 2/10/2008    Created by Conversion      Mood disorder due to a general medical condition      Movement disorder 9/19/2018     Multiple system atrophy P (H)      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     Murmur      Nonrheumatic aortic valve stenosis 10/23/2020     Nontoxic multinodular goiter 11/22/2006    Overview:  thyroidectomy 7/07 for atypical cells on FNA- benign cysts seen at time of surgical pathology     Obesity, unspecified     Created by Conversion      Orthostatic hypotension dysautonomic syndrome 9/22/2017     Osteoarthritis      Partial small bowel obstruction (H)      Polyp of colon 9/19/2016     Post-op pain 12/12/2018     Postoperative hypothyroidism 1/15/2013    Overview:  7/2/07-  Thyroidectomy by Dr Wang Crenshaw     Prediabetes 10/26/2010     Primary insomnia 7/4/2017     REM sleep behavior disorder 8/5/2020     Retinal migraine 4/28/2014     Rheumatic fever     thought to have had as a child     Rupture of  left Achilles tendon, sequela 12/31/2019     S/P AAA repair using bifurcation graft 11/30/2015     S/P laparoscopic cholecystectomy 12/12/2018     Scoliosis      Sleep difficulties      Small bowel obstruction (H)      Thyroid disease      Thyroid nodule     removed nodules and thyroid     Tinnitus     Created by Conversion  Replacement Utility updated for latest IMO load     Undiagnosed cardiac murmurs 11/22/2006     Vitamin D deficiency 1/15/2013     Weak 4/29/2018     Weakness 5/4/2017     Weakness of both lower extremities 7/5/2018       PAST SURGICAL HISTORY:   Past Surgical History:   Procedure Laterality Date     AAA REPAIR  2015    wt bifurcation graft     CARPAL TUNNEL RELEASE RT/LT Bilateral 2013     HC REVISE MEDIAN N/CARPAL TUNNEL SURG      Description: Neuroplasty Decompression Median Nerve At Carpal Tunnel;  Recorded: 01/21/2013;  Comments: bilateral     HYSTERECTOMY  2013     IR ABDOMINAL ENDOVASCULAR STENT GRAFT  11/30/2015     JOINT REPLACEMENT Right 2003     JOINT REPLACEMENT       LAPAROSCOPIC CHOLECYSTECTOMY N/A 12/12/2018    Procedure: LAPAROSCOPIC CHOLECYSTECTOMY;  Surgeon: Amadeo Sebastian MD;  Location: [facility] OR     ORTHOPEDIC SURGERY       AR THYROIDECTOMY      Description: Thyroid Surgery Total Thyroidectomy;  Recorded: 06/08/2011;     SPINE SURGERY  1981    cervical spine fusion     THYROIDECTOMY  2011     XR MAJOR JOINT OR BURSA INJ/ASP BILATERAL  5/18/2020     XR MAJOR JOINT OR BURSA INJ/ASP UNILATERAL  5/18/2020     [facility] CERV SPINE FUSN,ANTER,BELOW C2      Description: Cervical Vertebral Fusion;  Recorded: 01/21/2013;  Comments: 1981     [facility] TOTAL ABDOM HYSTERECTOMY  1985    Description: Hysterectomy;  Recorded: 01/21/2013;     [facility] TOTAL HIP ARTHROPLASTY Right     Description: Total Hip Replacement;  Recorded: 01/21/2013;  Comments: right, 2003       FAMILY HISTORY:   Family History   Problem Relation Age of Onset     Hypertension Mother      Coronary Artery Disease Mother      Coronary  Artery Disease Father      Other Cancer Brother      Parkinsonism Other      Heart Disease Mother      Heart Disease Father      Cancer Brother 57.00        colon     Hypertension Daughter      Hypertension Daughter      Neuropathy Daughter      Lupus Daughter      Heart Disease Paternal Aunt      Heart Disease Paternal Uncle      Parkinsonism Paternal Uncle        SOCIAL HISTORY:   Social History     Tobacco Use     Smoking status: Former Smoker     Packs/day: 0.50     Types: Cigarettes     Quit date: 1994     Years since quittin.4     Smokeless tobacco: Never Used   Substance Use Topics     Alcohol use: No        HEALTH & LIFESTYLE PRACTICES  Tobacco:  reports that she quit smoking about 27 years ago. Her smoking use included cigarettes. She smoked 0.50 packs per day. She has never used smokeless tobacco.  Alcohol:  reports no history of alcohol use.  Illicit drugs:  reports no history of drug use.    Allergies  Allergies   Allergen Reactions     Penicillins Anaphylaxis, Rash and Shortness Of Breath     Aspirin Nausea and GI Disturbance     Atenolol Cough     Atorvastatin Muscle Pain (Myalgia) and Nausea and Vomiting     Bupropion Nausea     Cephalexin Rash     Localized to neck area     Clindamycin Other (See Comments)     Constipation      Codeine Nausea     Ibuprofen Nausea and GI Disturbance     Lovastatin Muscle Pain (Myalgia)       Problem List  Patient Active Problem List    Diagnosis Date Noted     Urinary tract infection without hematuria, site unspecified 2022     Priority: Medium     Closed stable burst fracture of twelfth thoracic vertebra, initial encounter (H) 2022     Priority: Medium     Agitation 2021     Priority: Medium     Asymptomatic postmenopausal status 2021     Priority: Medium     Formatting of this note might be different from the original.  Created by Conversion    Replacement Utility updated for latest IMO load       Cholangiectasis 2021      Priority: Medium     Cognitive and behavioral changes 02/17/2021     Priority: Medium     Dilated pancreatic duct 02/17/2021     Priority: Medium     Irregular bowel habits 02/17/2021     Priority: Medium     Major depressive disorder, single episode, moderate (H) 02/17/2021     Priority: Medium     Metabolic encephalopathy 02/17/2021     Priority: Medium     Mood disorder due to a general medical condition 02/17/2021     Priority: Medium     Obesity 02/17/2021     Priority: Medium     Formatting of this note might be different from the original.  Created by Conversion       Orthostatic hypotension 02/17/2021     Priority: Medium     Partial small bowel obstruction (H) 02/17/2021     Priority: Medium     Pelvic floor dysfunction 02/17/2021     Priority: Medium     Sleep difficulties 02/17/2021     Priority: Medium     Tinnitus 02/17/2021     Priority: Medium     Formatting of this note might be different from the original.  Created by Conversion    Replacement Utility updated for latest IMO load       Nausea and vomiting 02/17/2021     Priority: Medium     Psychosis, unspecified psychosis type (H) 01/04/2021     Priority: Medium     Lewy body dementia without behavioral disturbance (H) 01/04/2021     Priority: Medium     Fall from standing, initial encounter 12/23/2020     Priority: Medium     Multiple closed fractures of pelvis without disruption of pelvic ring, initial encounter (H) 12/23/2020     Priority: Medium     Compression fracture of L4 lumbar vertebra, closed, initial encounter (H) 10/11/2020     Priority: Medium     Compression fracture of fourth lumbar vertebra (H) 10/10/2020     Priority: Medium     Compression fracture of L4 vertebra, initial encounter (H) 10/10/2020     Priority: Medium     Compression fracture of lumbar vertebra (H) 10/10/2020     Priority: Medium     Fall 08/05/2020     Priority: Medium     Bilateral knee pain 08/05/2020     Priority: Medium     REM sleep behavior disorder  08/05/2020     Priority: Medium     Lives in assisted living facility 05/16/2020     Priority: Medium     Disorder of biliary tract 03/16/2020     Priority: Medium     Intestinal obstruction (H) 03/04/2020     Priority: Medium     Rupture of left Achilles tendon, sequela 12/31/2019     Priority: Medium     Abdominal pain, generalized 12/31/2019     Priority: Medium     Digestive symptom 04/18/2019     Priority: Medium     Disorder of intestine 04/04/2019     Priority: Medium     Post-op pain 12/12/2018     Priority: Medium     S/P laparoscopic cholecystectomy 12/12/2018     Priority: Medium     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  Created by Conversion    Replacement Utility updated for latest IMO load    Overview:   Overview:   Created by Conversion    Replacement Utility updated for latest IMO load       Adjustment disorder with anxious mood 09/17/2018     Priority: Medium     Adjustment disorder with mixed anxiety and depressed mood 09/17/2018     Priority: Medium     Bradycardia 09/17/2018     Priority: Medium     Cataract 09/17/2018     Priority: Medium     Overview:   Created by Conversion    Formatting of this note might be different from the original.  Created by Conversion    Overview:   Overview:   Created by Conversion       Major depressive disorder, recurrent episode (H) 09/17/2018     Priority: Medium     Overview:   Created by Conversion    Replacement Utility updated for latest IMO load    Formatting of this note might be different from the  original.  Created by Conversion    Replacement Utility updated for latest IMO load    Overview:   Overview:   Created by Conversion    Replacement Utility updated for latest IMO load       Dyslipidemia 09/17/2018     Priority: Medium     Overview:   Created by Conversion    Formatting of this note might be different from the original.  Overview:   Overview:   Created by Conversion       Esophageal reflux 09/17/2018     Priority: Medium     Overview:   Created by Conversion       Insomnia due to anxiety and fear 09/17/2018     Priority: Medium     Disorder of bone and cartilage 09/17/2018     Priority: Medium     Overview:   Created by Conversion  [facility] Annotation: Dec 13 2012  7:41Roberta Kelly: vit D/Ca, f/u   Dexa needed 1/2014.    Replacement Utility updated for latest IMO load    Formatting of this note might be different from the original.  Created by Conversion  [facility] Annotation: Dec 13 2012  7:Roberta Padron: vit D/Ca, f/u   Dexa needed 1/2014.    Replacement Utility updated for latest IMO load    Overview:   Overview:   Created by [facility] Annotation: Dec 13 2012  7:41Roberta Kelly: vit D/Ca, f/u   Dexa needed 1/2014.    Replacement Utility updated for latest IMO load       Disorder of stomach 08/15/2018     Priority: Medium     Gastritis 08/13/2018     Priority: Medium     Gastroesophageal reflux disease without esophagitis 08/13/2018     Priority: Medium     Weakness of both lower extremities 07/05/2018     Priority: Medium     Lower extremity weakness 07/04/2018     Priority: Medium     Generalized muscle weakness 07/04/2018     Priority: Medium     Orthostatic hypotension dysautonomic syndrome 09/22/2017     Priority: Medium     Insomnia due to mental condition 07/04/2017     Priority: Medium     Dizziness 07/03/2017     Priority: Medium     Overview:   Created by Conversion    Formatting of this note might be different from the original.  Created by  Conversion  Formatting of this note might be different from the original.  Overview:   Overview:   Created by Conversion       Gastro-esophageal reflux disease with esophagitis 06/06/2017     Priority: Medium     Bloating 05/05/2017     Priority: Medium     Weak 05/04/2017     Priority: Medium     Chronic low back pain 09/29/2016     Priority: Medium     Benign neoplasm of ascending colon 09/19/2016     Priority: Medium     Gaseous abdominal distention 09/19/2016     Priority: Medium     Hemorrhage of rectum and anus 09/19/2016     Priority: Medium     Polyp of colon 09/19/2016     Priority: Medium     Abnormal computed tomography scan 09/15/2016     Priority: Medium     Abnormal finding on imaging 09/15/2016     Priority: Medium     Advised about management of weight 09/15/2016     Priority: Medium     Dietary counseling and surveillance 09/15/2016     Priority: Medium     S/P AAA repair using bifurcation graft 11/30/2015     Priority: Medium     Diverticulosis of colon 12/18/2014     Priority: Medium     Migraine with aura, not intractable, without status migrainosus 04/28/2014     Priority: Medium     Formatting of this note might be different from the original.  Created by Conversion    Replacement Utility updated for latest IMO load       Flatulence, eructation and gas pain 02/19/2014     Priority: Medium     Dysphagia 12/17/2013     Priority: Medium     Constipation 12/16/2013     Priority: Medium     Early satiety 12/16/2013     Priority: Medium     Right upper quadrant pain 12/16/2013     Priority: Medium     Chest wall pain 01/20/2013     Priority: Medium     Postoperative hypothyroidism 01/15/2013     Priority: Medium     Formatting of this note might be different from the original.  Overview:   7/2/07-  Thyroidectomy by Dr Wang Crenshaw       Vitamin D deficiency 01/15/2013     Priority: Medium     Harris's esophagus 01/11/2012     Priority: Medium     Overview:   Created by [employer]  [facility]  Annotation: Feb  3 2012  8:32Cameron Tejada: Needs EGD 2/2017    Formatting of this note might be different from the original.  Created by [employer] [facility] Annotation: Feb  3 2012  8:32Cameron Tejada: Needs EGD 2/2017    Overview:   Overview:   Created by [employer] [facility] Annotation: Feb  3 2012  8:32Cameron Tejada: Needs EGD 2/2017       Anemia 11/18/2010     Priority: Medium     Retention of urine 11/02/2010     Priority: Medium     Formatting of this note might be different from the original.  Overview:   Postoperative       Prediabetes 10/26/2010     Priority: Medium     Hoarseness of voice 04/06/2010     Priority: Medium     Hypothyroidism 09/22/2009     Priority: Medium     Overview:   Created by Conversion    Replacement Utility updated for latest IMO load    Formatting of this note might be different from the original.  Created by Conversion    Replacement Utility updated for latest IMO load    Overview:   Overview:   Created by Conversion    Replacement Utility updated for latest IMO load       Depression, major 07/13/2009     Priority: Medium     Adrenal adenoma 06/04/2008     Priority: Medium     Overview:   Current hormonal evaluation through endocrinology    Formatting of this note might be different from the original.  Current hormonal evaluation through endocrinology    Overview:   Overview:   Current hormonal evaluation through endocrinology  Overview:   3/08 Abdomen CT scan: 2 cm left adrenal mass seen.  9/08 MRI  Abd: 2.9 cm  Recheck in 9/09.       Anxiety state 02/26/2008     Priority: Medium     DDD (degenerative disc disease), lumbosacral 02/26/2008     Priority: Medium     Aneurysm of abdominal vessel (H) 02/25/2008     Priority: Medium     Overview:   Created by [employer]  [facility] Annotation: Jun 8 2011  8:Danni French: 4.4 on 11/2013.    Needs 6-12 month u/s or CT.    Replacement Utility updated for latest IMO load    Formatting of this note might  be different from the original.  Created by Conversion  [facility] Annotation: Jun 8 2011  8:07AM Danni Grigsby: 4.4 on 11/2013.    Needs 6-12 month u/s or CT.    Replacement Utility updated for latest IMO load    Overview:   2/08 Abd US: 5.4 cm.  3/08 CT Abd: 2.7 x 3.3.  9/08:  MRI Abd: 2.7X3.2  Next 9/09.       Mixed hyperlipidemia 02/10/2008     Priority: Medium     Formatting of this note might be different from the original.  Created by Conversion       Malaise and fatigue 07/10/2007     Priority: Medium     Hypokalemia 06/29/2007     Priority: Medium     Hypertension 11/22/2006     Priority: Medium     Overview:   Created by Conversion    Replacement Utility updated for latest IMO load    Formatting of this note might be different from the original.  Created by Conversion    Replacement Utility updated for latest IMO load    Overview:   Overview:   Created by Conversion    Replacement Utility updated for latest IMO load       Osteoarthrosis 11/22/2006     Priority: Medium     Overview:   Created by Conversion    Replacement Utility updated for latest IMO load    Formatting of this note might be different from the original.  Created by Conversion    Replacement Utility updated for latest IMO load    Overview:   Overview:   Created by Conversion    Replacement Utility updated for latest IMO load       Contact dermatitis and eczema 11/22/2006     Priority: Medium     Nontoxic multinodular goiter 11/22/2006     Priority: Medium     Formatting of this note might be different from the original.  Overview:   thyroidectomy 7/07 for atypical cells on FNA-  benign cysts seen at time of surgical pathology       Undiagnosed cardiac murmurs 11/22/2006     Priority: Medium     Irritable bowel syndrome 11/22/2006     Priority: Medium       Prior to Admission Medications   Medications Prior to Admission   Medication Sig Dispense Refill Last Dose     acetaminophen (TYLENOL) 500 MG tablet Take 2 tablets (1,000 mg) by mouth  3 times daily   5/16/2022 at 7AM     bisacodyl (DULCOLAX) 10 MG suppository Place 10 mg rectally daily as needed for constipation   Unknown at PRN     Buprenorphine HCl (BELBUCA) 150 MCG FILM buccal film Place 150 mcg inside cheek 2 times daily   5/16/2022 at 7AM     carbidopa-levodopa (SINEMET)  MG tablet Take 1 tablet by mouth 2 times daily   5/16/2022 at 7AM     diclofenac (VOLTAREN) 1 % topical gel Apply 2 g topically 3 times daily as needed for moderate pain   Unknown at PRN     DULoxetine (CYMBALTA) 60 MG capsule Take 60 mg by mouth daily   5/16/2022 at 7AM     gabapentin (NEURONTIN) 300 MG capsule Take 600 mg by mouth At Bedtime   5/15/2022 at Unknown time     gabapentin (NEURONTIN) 300 MG capsule Take 300 mg by mouth 2 times daily AM and 1345   5/16/2022 at 7AM     hydrALAZINE (APRESOLINE) 10 MG tablet Take 5 mg by mouth 2 times daily   5/16/2022 at 7AM     lactobacillus rhamnosus, GG, (CULTURELL) capsule Take 1 capsule by mouth daily   5/16/2022 at am     levothyroxine (SYNTHROID/LEVOTHROID) 100 MCG tablet Take 1 tablet (100 mcg) by mouth daily   5/16/2022 at 7AM     lidocaine (LIDODERM) 5 % patch Place 1 patch onto the skin every 24 hours To prevent lidocaine toxicity, patient should be patch free for 12 hrs daily.   Unknown at never used     losartan (COZAAR) 50 MG tablet Take 50 mg by mouth 2 times daily   5/16/2022 at 7AM     naloxone (NARCAN) 4 MG/0.1ML nasal spray Spray 4 mg into one nostril alternating nostrils once as needed for opioid reversal every 2-3 minutes until assistance arrives   Unknown at never used     nystatin (MYCOSTATIN) 711716 UNIT/GM external cream Apply topically 2 times daily APPLY TOPICALLY TO AREA UNDER  right BREAST   5/16/2022 at 7AM     oxyCODONE (ROXICODONE) 5 MG tablet Take 5 mg by mouth 4 times daily as needed for severe pain (severe breakthrough pain rated 7)   Unknown at Unknown time     polyethylene glycol (MIRALAX) 17 g packet Take 17 g by mouth daily as needed  "for constipation If pt has not had a BM during the day   Unknown at Unknown time     polyethylene glycol (MIRALAX) 17 GM/Dose powder Take 17 g by mouth daily 510 g  5/16/2022 at am     polyethylene glycol-propylene glycol (SYSTANE ULTRA) 0.4-0.3 % SOLN ophthalmic solution Place 2 drops into both eyes every hour as needed for dry eyes   Unknown at Unknown time     polyethylene glycol-propylene glycol (SYSTANE ULTRA) 0.4-0.3 % SOLN ophthalmic solution Place 2 drops into both eyes 3 times daily (and additionally as needed/requested)   5/16/2022 at 7AM     QUEtiapine (SEROQUEL) 25 MG tablet Take 1 tablet (25 mg) by mouth At Bedtime   5/15/2022 at Unknown time     senna-docusate (SENOKOT-S/PERICOLACE) 8.6-50 MG tablet Take 1 tablet by mouth daily   5/16/2022 at Unknown time     senna-docusate (SENOKOT-S/PERICOLACE) 8.6-50 MG tablet Take 1 tablet by mouth 2 times daily as needed for constipation   PRN     simethicone (MYLICON) 125 MG chewable tablet Take 125 mg by mouth 2 times daily   5/16/2022 at 7AM     Simethicone 125 MG TABS Take 1 chew tab by mouth 2 times daily as needed bloating   PRN     sodium phosphate (FLEET ENEMA) 7-19 GM/118ML rectal enema Place 1 enema rectally once as needed for constipation   PRN     vitamin D3 (CHOLECALCIFEROL) 50 mcg (2000 units) tablet Take 1 tablet (50 mcg) by mouth daily   5/16/2022 at 7AM       Review of Systems  Complete ROS reviewed, unless noted in HPI, all other systems reviewed (with patient) and all others found to be negative.      Objective:     Physical Exam:  /83 (BP Location: Right arm)   Pulse 69   Temp 98.4  F (36.9  C) (Oral)   Resp 16   Ht 1.676 m (5' 6\")   Wt 68 kg (150 lb)   SpO2 90%   BMI 24.21 kg/m    Weight:   Vitals:    05/16/22 1246   Weight: 68 kg (150 lb)      Body mass index is 24.21 kg/m .    General Appearance:  Lethargic, appears comfortable, no distress    Head:  Normocephalic, without obvious abnormality, atraumatic   Eyes:  PERRL, " conjunctiva/corneas clear, EOM's intact   ENT/Throat: Lips, mucosa, and tongue normal; teeth and gums normal   Lymph/Neck: Supple, symmetrical, trachea midline   Lungs:   Clear to auscultation bilaterally, respirations unlabored, room air   Chest Wall:  No tenderness or deformity   Cardiovascular/Heart:  Regular rate and rhythm, S1, S2 normal,no murmur, rub or gallop.    Abdomen:   Soft, non-tender, bowel sounds active all four quadrants   Musculoskeletal: Extremities normal, atraumatic, no edema   Skin: Skin warm, dry pale    Neurologic: RASS -2      Psych: Asleep     Imaging: Reviewed  XR Femur Left 2 Views    Result Date: 5/16/2022  EXAM: XR FEMUR LEFT 2 VIEW LOCATION: [facility] DATE/TIME: 5/16/2022 2:29 PM INDICATION: Pain after fall. COMPARISON: None.     IMPRESSION: 1. Old healed pelvic fractures partially included in the field-of-view. 2. Total knee arthroplasty. 3. Arterial calcifications. 4. There is no evidence of a femur fracture or osteonecrosis.    XR Knee Left 1/2 Views    Result Date: 5/16/2022  EXAM: XR KNEE LT 1/2 VW LOCATION: [facility] DATE/TIME: 5/16/2022 2:15 PM INDICATION: Left knee pain following fall. COMPARISON: None.     IMPRESSION: Left total knee arthroplasty with patellar resurfacing. Components project in the expected position. No acute displaced periprosthetic fracture or finding for component failure. No significant left knee joint effusion. Distal left quadriceps insertional enthesopathy at the patella. Arterial calcification.    XR Pelvis 1/2 Views    Result Date: 5/16/2022  EXAM: XR PELVIS 1/2 VW LOCATION: [facility] DATE/TIME: 5/16/2022 2:15 PM INDICATION: Left hip pain fall. COMPARISON: None.     IMPRESSION: 1. Old healed sacral and pubic ramus fractures. 2. Right total hip arthroplasty and vascular stents. 3. Diffuse bone demineralization. No evidence of an acute fracture.    Cervical spine CT w/o  contrast    Result Date: 5/16/2022  EXAM: CT HEAD W/O CONTRAST, CT CERVICAL SPINE W/O CONTRAST, CT THORACIC SPINE W/O CONTRAST, CT LUMBAR SPINE W/O CONTRAST LOCATION: [facility] DATE/TIME: 5/16/2022 1:56 PM INDICATION: Acute traumatic head, neck, and back injury; mechanical ground-level fall after tripping. Left forehead swelling. COMPARISON: CTA head and cervicothoracic spine 12/23/2020 and CT lumbar spine 02/27/2022. TECHNIQUE: 1. Routine CT head without IV contrast. Multiplanar reformats. Dose reduction techniques were used. 2. Routine CT cervical spine without IV contrast. Multiplanar reformats. Dose reduction techniques were used. 3. Routine CT thoracic spine without IV contrast. Multiplanar reformats. Dose reduction techniques were used. 4. Routine CT lumbar spine without IV contrast. Multiplanar reformats. Dose reduction techniques were used. FINDINGS: HEAD CT: INTRACRANIAL CONTENTS: No intracranial hemorrhage, extraaxial collection, or mass effect.  No CT evidence of acute infarct. Mild presumed chronic small vessel ischemic changes. Mild to moderate generalized volume loss. No hydrocephalus. VISUALIZED ORBITS/SINUSES/MASTOIDS: Prior bilateral cataract surgery. Visualized portions of the orbits are otherwise unremarkable. No paranasal sinus mucosal disease. No middle ear or mastoid effusion. BONES/SOFT TISSUES: Mild soft tissue swelling over the left forehead without underlying acute calvarial fracture. CERVICAL SPINE CT: VERTEBRAE: Diffuse bone mineralization. Ankylosis of the C5-C7 vertebral bodies with mild chronic degenerative anterior wedging of the superior endplate of the C5 vertebral body. Otherwise normal vertebral body heights. Mild cervical dextrocurvature. Reversal of the usual cervical lordosis centered at C4-C5 with slight anterolisthesis of C3 on C4 and of C7 on T1. No evidence for acute fracture or traumatic subluxation.  CANAL/FORAMINA: No high-grade spinal canal  stenosis. Severe degenerative neuroforaminal stenosis on the left at C3-C4 and C4-C5. PARASPINAL: Paraspinous soft tissues are unremarkable. THORACIC SPINE CT: VERTEBRAE: Diffuse bone demineralization. There is an acute burst-type compression fracture deformity of the T12 vertebral body with approximately 50% height loss centrally and 3 mm retropulsion. No posttraumatic spinal canal stenosis or segmental kyphosis. Otherwise normal vertebral body heights. Thoracic dextrocurvature measures approximately 40 degrees from the superior T6 endplate to the inferior T11 endplate, apex at T8-T9. Upper thoracic levocurvature measures 18 degrees from the superior T1  endplate to the inferior T5 endplate, apex at T2-T3. Normal sagittal alignment is grossly maintained. CANAL/FORAMINA: No high-grade spinal canal or neural foraminal stenosis. Moderate degenerative neuroforaminal stenosis on the left at T8-T9 and T9-T10. PARASPINAL: Visualized ribs are intact. Please see dedicated chest CT report for pulmonary and soft tissue thoracic findings. Visualized lung fields are clear. LUMBAR SPINE CT: VERTEBRAE: Diffuse bone mineralization. Unchanged chronic compression fracture deformities of the inferior endplate of L3 and superior endplates of L4 on L5. Otherwise normally maintained vertebral body heights. Lumbar levocurvature measures 30 degrees from the superior L1 endplate to the inferior L5 endplate. No evidence for interval acute fracture or posttraumatic subluxation. CANAL/FORAMINA: No high-grade spinal canal stenosis. Mild to moderate degenerative spinal canal stenosis at L3-L4. Severe degenerative neuroforaminal stenosis on the left at L3-L4. PARASPINAL: Mild degenerative change in the bilateral sacroiliac joints. Please see dedicated abdomen/pelvis CT report for pulmonary and thoracic soft tissue findings. Partially visualized right total hip arthroplasty.     IMPRESSION: HEAD CT: 1.  No acute intracranial abnormality. 2.  Mild  left forehead soft tissue contusion without underlying acute calvarial fracture. 3.  Mild to moderate global brain parenchymal volume loss with presumed sequelae of mild chronic small vessel ischemic disease. CERVICAL SPINE CT: 1.  No evidence for acute fracture or post traumatic subluxation of the cervical spine by CT imaging. 2.  No high-grade spinal canal stenosis. 3.  Severe degenerative neuroforaminal stenosis on the left at C3-C4 and C4-C5. THORACIC SPINE CT: 1.  Acute burst-type compression fracture deformity of the T12 vertebral body with 50% height loss and 3 mm retropulsion. No posttraumatic spinal canal stenosis or segmental kyphosis. 2.  No high-grade spinal canal or neuroforaminal stenosis. LUMBAR SPINE CT: 1.  No evidence for acute fracture or post traumatic subluxation of the lumbar spine by CT imaging. 2.  No high-grade spinal canal or neuroforaminal stenosis.     CT Chest Abdomen Pelvis w/o Contrast    Result Date: 5/16/2022  EXAM: CT CHEST ABDOMEN PELVIS W/O CONTRAST LOCATION: [facility] DATE/TIME: 5/16/2022 1:57 PM INDICATION: Injury to the chest, abdomen and pelvis during a fall with chest, abdominal, and pelvic pain. COMPARISON: CT AP 02/27/2022. TECHNIQUE: CT scan of the chest, abdomen, and pelvis was performed without IV contrast. Multiplanar reformats were obtained. Dose reduction techniques were used. CONTRAST: None. FINDINGS: LUNGS AND PLEURA: No pneumothorax or pleural effusion. Strands of fibrosis in the lingula. Lungs otherwise clear. MEDIASTINUM/AXILLAE: Heart size normal with aortic valve leaflet, coronary artery and mitral annular calcification. No pericardial effusion. Calcified atheromatous plaque throughout the normal caliber thoracic aorta. Normal caliber central pulmonary arteries. No lymphadenopathy. CORONARY ARTERY CALCIFICATION: Moderate. HEPATOBILIARY: Liver is normal. Stable mild bile duct dilatation with no radiodense stone or mass consistent with  reservoir effect from prior cholecystectomy. PANCREAS: Normal. SPLEEN: Spleen size normal. ADRENAL GLANDS: Benign 2 cm adenoma left adrenal gland is unchanged and requires no follow-up. Adrenal glands otherwise normal. KIDNEY/BLADDER: Well-positioned Diaz catheter in the bladder. Kidneys, ureters and bladder are otherwise normal. BOWEL: No bowel obstruction or inflammatory change. No free air or free fluid. LYMPH NODES: No lymphadenopathy. VASCULATURE: Aortobiiliac stent. PELVIC ORGANS: No pelvic mass or fluid. MUSCULOSKELETAL: Slightly comminuted acute compression fracture T12 vertebral body. Chronic compression fracture superior endplate L4 vertebral body and healed bilateral pubic rami and sacral alar fractures.  Bones are demineralized. Right hip arthroplasty.     IMPRESSION: 1.  Slightly comminuted acute compression fracture T12 vertebral body. 2.  No additional acute findings identified. 3.  Chronic compression fracture superior endplate L4 vertebral body and healed bilateral pubic rami and sacral alar fractures.      Head CT w/o contrast    Result Date: 5/16/2022  EXAM: CT HEAD W/O CONTRAST, CT CERVICAL SPINE W/O CONTRAST, CT THORACIC SPINE W/O CONTRAST, CT LUMBAR SPINE W/O CONTRAST LOCATION: [facility] DATE/TIME: 5/16/2022 1:56 PM INDICATION: Acute traumatic head, neck, and back injury; mechanical ground-level fall after tripping. Left forehead swelling. COMPARISON: CTA head and cervicothoracic spine 12/23/2020 and CT lumbar spine 02/27/2022. TECHNIQUE: 1. Routine CT head without IV contrast. Multiplanar reformats. Dose reduction techniques were used. 2. Routine CT cervical spine without IV contrast. Multiplanar reformats. Dose reduction techniques were used. 3. Routine CT thoracic spine without IV contrast. Multiplanar reformats. Dose reduction techniques were used. 4. Routine CT lumbar spine without IV contrast. Multiplanar reformats. Dose reduction techniques were used.  FINDINGS: HEAD CT: INTRACRANIAL CONTENTS: No intracranial hemorrhage, extraaxial collection, or mass effect.  No CT evidence of acute infarct. Mild presumed chronic small vessel ischemic changes. Mild to moderate generalized volume loss. No hydrocephalus. VISUALIZED ORBITS/SINUSES/MASTOIDS: Prior bilateral cataract surgery. Visualized portions of the orbits are otherwise unremarkable. No paranasal sinus mucosal disease. No middle ear or mastoid effusion. BONES/SOFT TISSUES: Mild soft tissue swelling over the left forehead without underlying acute calvarial fracture. CERVICAL SPINE CT: VERTEBRAE: Diffuse bone mineralization. Ankylosis of the C5-C7 vertebral bodies with mild chronic degenerative anterior wedging of the superior endplate of the C5 vertebral body. Otherwise normal vertebral body heights. Mild cervical dextrocurvature. Reversal of the usual cervical lordosis centered at C4-C5 with slight anterolisthesis of C3 on C4 and of C7 on T1. No evidence for acute fracture or traumatic subluxation.  CANAL/FORAMINA: No high-grade spinal canal stenosis. Severe degenerative neuroforaminal stenosis on the left at C3-C4 and C4-C5. PARASPINAL: Paraspinous soft tissues are unremarkable. THORACIC SPINE CT: VERTEBRAE: Diffuse bone demineralization. There is an acute burst-type compression fracture deformity of the T12 vertebral body with approximately 50% height loss centrally and 3 mm retropulsion. No posttraumatic spinal canal stenosis or segmental kyphosis. Otherwise normal vertebral body heights. Thoracic dextrocurvature measures approximately 40 degrees from the superior T6 endplate to the inferior T11 endplate, apex at T8-T9. Upper thoracic levocurvature measures 18 degrees from the superior T1  endplate to the inferior T5 endplate, apex at T2-T3. Normal sagittal alignment is grossly maintained. CANAL/FORAMINA: No high-grade spinal canal or neural foraminal stenosis. Moderate degenerative neuroforaminal stenosis on  the left at T8-T9 and T9-T10. PARASPINAL: Visualized ribs are intact. Please see dedicated chest CT report for pulmonary and soft tissue thoracic findings. Visualized lung fields are clear. LUMBAR SPINE CT: VERTEBRAE: Diffuse bone mineralization. Unchanged chronic compression fracture deformities of the inferior endplate of L3 and superior endplates of L4 on L5. Otherwise normally maintained vertebral body heights. Lumbar levocurvature measures 30 degrees from the superior L1 endplate to the inferior L5 endplate. No evidence for interval acute fracture or posttraumatic subluxation. CANAL/FORAMINA: No high-grade spinal canal stenosis. Mild to moderate degenerative spinal canal stenosis at L3-L4. Severe degenerative neuroforaminal stenosis on the left at L3-L4. PARASPINAL: Mild degenerative change in the bilateral sacroiliac joints. Please see dedicated abdomen/pelvis CT report for pulmonary and thoracic soft tissue findings. Partially visualized right total hip arthroplasty.     IMPRESSION: HEAD CT: 1.  No acute intracranial abnormality. 2.  Mild left forehead soft tissue contusion without underlying acute calvarial fracture. 3.  Mild to moderate global brain parenchymal volume loss with presumed sequelae of mild chronic small vessel ischemic disease. CERVICAL SPINE CT: 1.  No evidence for acute fracture or post traumatic subluxation of the cervical spine by CT imaging. 2.  No high-grade spinal canal stenosis. 3.  Severe degenerative neuroforaminal stenosis on the left at C3-C4 and C4-C5. THORACIC SPINE CT: 1.  Acute burst-type compression fracture deformity of the T12 vertebral body with 50% height loss and 3 mm retropulsion. No posttraumatic spinal canal stenosis or segmental kyphosis. 2.  No high-grade spinal canal or neuroforaminal stenosis. LUMBAR SPINE CT: 1.  No evidence for acute fracture or post traumatic subluxation of the lumbar spine by CT imaging. 2.  No high-grade spinal canal or neuroforaminal  stenosis.     Lumbar spine CT w/o contrast    Result Date: 5/16/2022  EXAM: CT HEAD W/O CONTRAST, CT CERVICAL SPINE W/O CONTRAST, CT THORACIC SPINE W/O CONTRAST, CT LUMBAR SPINE W/O CONTRAST LOCATION: [facility] DATE/TIME: 5/16/2022 1:56 PM INDICATION: Acute traumatic head, neck, and back injury; mechanical ground-level fall after tripping. Left forehead swelling. COMPARISON: CTA head and cervicothoracic spine 12/23/2020 and CT lumbar spine 02/27/2022. TECHNIQUE: 1. Routine CT head without IV contrast. Multiplanar reformats. Dose reduction techniques were used. 2. Routine CT cervical spine without IV contrast. Multiplanar reformats. Dose reduction techniques were used. 3. Routine CT thoracic spine without IV contrast. Multiplanar reformats. Dose reduction techniques were used. 4. Routine CT lumbar spine without IV contrast. Multiplanar reformats. Dose reduction techniques were used. FINDINGS: HEAD CT: INTRACRANIAL CONTENTS: No intracranial hemorrhage, extraaxial collection, or mass effect.  No CT evidence of acute infarct. Mild presumed chronic small vessel ischemic changes. Mild to moderate generalized volume loss. No hydrocephalus. VISUALIZED ORBITS/SINUSES/MASTOIDS: Prior bilateral cataract surgery. Visualized portions of the orbits are otherwise unremarkable. No paranasal sinus mucosal disease. No middle ear or mastoid effusion. BONES/SOFT TISSUES: Mild soft tissue swelling over the left forehead without underlying acute calvarial fracture. CERVICAL SPINE CT: VERTEBRAE: Diffuse bone mineralization. Ankylosis of the C5-C7 vertebral bodies with mild chronic degenerative anterior wedging of the superior endplate of the C5 vertebral body. Otherwise normal vertebral body heights. Mild cervical dextrocurvature. Reversal of the usual cervical lordosis centered at C4-C5 with slight anterolisthesis of C3 on C4 and of C7 on T1. No evidence for acute fracture or traumatic subluxation.   CANAL/FORAMINA: No high-grade spinal canal stenosis. Severe degenerative neuroforaminal stenosis on the left at C3-C4 and C4-C5. PARASPINAL: Paraspinous soft tissues are unremarkable. THORACIC SPINE CT: VERTEBRAE: Diffuse bone demineralization. There is an acute burst-type compression fracture deformity of the T12 vertebral body with approximately 50% height loss centrally and 3 mm retropulsion. No posttraumatic spinal canal stenosis or segmental kyphosis. Otherwise normal vertebral body heights. Thoracic dextrocurvature measures approximately 40 degrees from the superior T6 endplate to the inferior T11 endplate, apex at T8-T9. Upper thoracic levocurvature measures 18 degrees from the superior T1  endplate to the inferior T5 endplate, apex at T2-T3. Normal sagittal alignment is grossly maintained. CANAL/FORAMINA: No high-grade spinal canal or neural foraminal stenosis. Moderate degenerative neuroforaminal stenosis on the left at T8-T9 and T9-T10. PARASPINAL: Visualized ribs are intact. Please see dedicated chest CT report for pulmonary and soft tissue thoracic findings. Visualized lung fields are clear. LUMBAR SPINE CT: VERTEBRAE: Diffuse bone mineralization. Unchanged chronic compression fracture deformities of the inferior endplate of L3 and superior endplates of L4 on L5. Otherwise normally maintained vertebral body heights. Lumbar levocurvature measures 30 degrees from the superior L1 endplate to the inferior L5 endplate. No evidence for interval acute fracture or posttraumatic subluxation. CANAL/FORAMINA: No high-grade spinal canal stenosis. Mild to moderate degenerative spinal canal stenosis at L3-L4. Severe degenerative neuroforaminal stenosis on the left at L3-L4. PARASPINAL: Mild degenerative change in the bilateral sacroiliac joints. Please see dedicated abdomen/pelvis CT report for pulmonary and thoracic soft tissue findings. Partially visualized right total hip arthroplasty.     IMPRESSION: HEAD CT: 1.   No acute intracranial abnormality. 2.  Mild left forehead soft tissue contusion without underlying acute calvarial fracture. 3.  Mild to moderate global brain parenchymal volume loss with presumed sequelae of mild chronic small vessel ischemic disease. CERVICAL SPINE CT: 1.  No evidence for acute fracture or post traumatic subluxation of the cervical spine by CT imaging. 2.  No high-grade spinal canal stenosis. 3.  Severe degenerative neuroforaminal stenosis on the left at C3-C4 and C4-C5. THORACIC SPINE CT: 1.  Acute burst-type compression fracture deformity of the T12 vertebral body with 50% height loss and 3 mm retropulsion. No posttraumatic spinal canal stenosis or segmental kyphosis. 2.  No high-grade spinal canal or neuroforaminal stenosis. LUMBAR SPINE CT: 1.  No evidence for acute fracture or post traumatic subluxation of the lumbar spine by CT imaging. 2.  No high-grade spinal canal or neuroforaminal stenosis.     CT Thoracic Spine w/o Contrast    Result Date: 5/16/2022  EXAM: CT HEAD W/O CONTRAST, CT CERVICAL SPINE W/O CONTRAST, CT THORACIC SPINE W/O CONTRAST, CT LUMBAR SPINE W/O CONTRAST LOCATION: [facility] DATE/TIME: 5/16/2022 1:56 PM INDICATION: Acute traumatic head, neck, and back injury; mechanical ground-level fall after tripping. Left forehead swelling. COMPARISON: CTA head and cervicothoracic spine 12/23/2020 and CT lumbar spine 02/27/2022. TECHNIQUE: 1. Routine CT head without IV contrast. Multiplanar reformats. Dose reduction techniques were used. 2. Routine CT cervical spine without IV contrast. Multiplanar reformats. Dose reduction techniques were used. 3. Routine CT thoracic spine without IV contrast. Multiplanar reformats. Dose reduction techniques were used. 4. Routine CT lumbar spine without IV contrast. Multiplanar reformats. Dose reduction techniques were used. FINDINGS: HEAD CT: INTRACRANIAL CONTENTS: No intracranial hemorrhage, extraaxial collection, or mass  effect.  No CT evidence of acute infarct. Mild presumed chronic small vessel ischemic changes. Mild to moderate generalized volume loss. No hydrocephalus. VISUALIZED ORBITS/SINUSES/MASTOIDS: Prior bilateral cataract surgery. Visualized portions of the orbits are otherwise unremarkable. No paranasal sinus mucosal disease. No middle ear or mastoid effusion. BONES/SOFT TISSUES: Mild soft tissue swelling over the left forehead without underlying acute calvarial fracture. CERVICAL SPINE CT: VERTEBRAE: Diffuse bone mineralization. Ankylosis of the C5-C7 vertebral bodies with mild chronic degenerative anterior wedging of the superior endplate of the C5 vertebral body. Otherwise normal vertebral body heights. Mild cervical dextrocurvature. Reversal of the usual cervical lordosis centered at C4-C5 with slight anterolisthesis of C3 on C4 and of C7 on T1. No evidence for acute fracture or traumatic subluxation.  CANAL/FORAMINA: No high-grade spinal canal stenosis. Severe degenerative neuroforaminal stenosis on the left at C3-C4 and C4-C5. PARASPINAL: Paraspinous soft tissues are unremarkable. THORACIC SPINE CT: VERTEBRAE: Diffuse bone demineralization. There is an acute burst-type compression fracture deformity of the T12 vertebral body with approximately 50% height loss centrally and 3 mm retropulsion. No posttraumatic spinal canal stenosis or segmental kyphosis. Otherwise normal vertebral body heights. Thoracic dextrocurvature measures approximately 40 degrees from the superior T6 endplate to the inferior T11 endplate, apex at T8-T9. Upper thoracic levocurvature measures 18 degrees from the superior T1  endplate to the inferior T5 endplate, apex at T2-T3. Normal sagittal alignment is grossly maintained. CANAL/FORAMINA: No high-grade spinal canal or neural foraminal stenosis. Moderate degenerative neuroforaminal stenosis on the left at T8-T9 and T9-T10. PARASPINAL: Visualized ribs are intact. Please see dedicated chest CT  report for pulmonary and soft tissue thoracic findings. Visualized lung fields are clear. LUMBAR SPINE CT: VERTEBRAE: Diffuse bone mineralization. Unchanged chronic compression fracture deformities of the inferior endplate of L3 and superior endplates of L4 on L5. Otherwise normally maintained vertebral body heights. Lumbar levocurvature measures 30 degrees from the superior L1 endplate to the inferior L5 endplate. No evidence for interval acute fracture or posttraumatic subluxation. CANAL/FORAMINA: No high-grade spinal canal stenosis. Mild to moderate degenerative spinal canal stenosis at L3-L4. Severe degenerative neuroforaminal stenosis on the left at L3-L4. PARASPINAL: Mild degenerative change in the bilateral sacroiliac joints. Please see dedicated abdomen/pelvis CT report for pulmonary and thoracic soft tissue findings. Partially visualized right total hip arthroplasty.     IMPRESSION: HEAD CT: 1.  No acute intracranial abnormality. 2.  Mild left forehead soft tissue contusion without underlying acute calvarial fracture. 3.  Mild to moderate global brain parenchymal volume loss with presumed sequelae of mild chronic small vessel ischemic disease. CERVICAL SPINE CT: 1.  No evidence for acute fracture or post traumatic subluxation of the cervical spine by CT imaging. 2.  No high-grade spinal canal stenosis. 3.  Severe degenerative neuroforaminal stenosis on the left at C3-C4 and C4-C5. THORACIC SPINE CT: 1.  Acute burst-type compression fracture deformity of the T12 vertebral body with 50% height loss and 3 mm retropulsion. No posttraumatic spinal canal stenosis or segmental kyphosis. 2.  No high-grade spinal canal or neuroforaminal stenosis. LUMBAR SPINE CT: 1.  No evidence for acute fracture or post traumatic subluxation of the lumbar spine by CT imaging. 2.  No high-grade spinal canal or neuroforaminal stenosis.       Labs: Reviewed   Recent Results (from the past 24 hour(s))   ECG 12-LEAD WITH MUSE ([facility])     Collection Time: 05/16/22  1:05 PM   Result Value Ref Range    Systolic Blood Pressure 143 mmHg    Diastolic Blood Pressure 60 mmHg    Ventricular Rate 88 BPM    Atrial Rate 88 BPM    SD Interval 178 ms    QRS Duration 100 ms     ms    QTc 440 ms    P Axis 82 degrees    R AXIS 3 degrees    T Axis 89 degrees    Interpretation ECG       Sinus rhythm  Possible Left atrial enlargement  Inferior infarct (cited on or before 25-SEP-2015)  Anterior infarct (cited on or before 25-SEP-2015)  Abnormal ECG  When compared with ECG of 24-DEC-2020 15:45,  Questionable change in initial forces of Septal leads  Confirmed by SEE ED PROVIDER NOTE FOR, ECG INTERPRETATION ([id number]),  JONI LAROSE ([id number]) on 5/16/2022 1:05:37 PM     Asymptomatic COVID-19 Virus (Coronavirus) by PCR Nasopharyngeal    Collection Time: 05/16/22  1:20 PM    Specimen: Nasopharyngeal; Swab   Result Value Ref Range    SARS CoV2 PCR Negative Negative   UA with Microscopic reflex to Culture    Collection Time: 05/16/22  1:31 PM    Specimen: Urine, Diaz Catheter   Result Value Ref Range    Color Urine Yellow Colorless, Straw, Light Yellow, Yellow    Appearance Urine Turbid (A) Clear    Glucose Urine Negative Negative mg/dL    Bilirubin Urine Negative Negative    Ketones Urine Trace (A) Negative mg/dL    Specific Gravity Urine 1.024 1.001 - 1.030    Blood Urine Negative Negative    pH Urine 6.5 5.0 - 7.0    Protein Albumin Urine 20  (A) Negative mg/dL    Urobilinogen Urine 2.0 (A) <2.0 mg/dL    Nitrite Urine Negative Negative    Leukocyte Esterase Urine Negative Negative    Bacteria Urine Few (A) None Seen /HPF    Budding Yeast Urine Few (A) None Seen /HPF    Mucus Urine Present (A) None Seen /LPF    RBC Urine 4 (H) <=2 /HPF    WBC Urine 12 (H) <=5 /HPF    Squamous Epithelials Urine 7 (H) <=1 /HPF    Hyaline Casts Urine 7 (H) <=2 /LPF   Urine Culture    Collection Time: 05/16/22  1:31 PM    Specimen: Urine, Diaz Catheter   Result Value Ref Range     Culture Culture in progress    Basic metabolic panel    Collection Time: 05/16/22  1:39 PM   Result Value Ref Range    Sodium 143 136 - 145 mmol/L    Potassium 4.1 3.5 - 5.0 mmol/L    Chloride 105 98 - 107 mmol/L    Carbon Dioxide (CO2) 29 22 - 31 mmol/L    Anion Gap 9 5 - 18 mmol/L    Urea Nitrogen 18 8 - 28 mg/dL    Creatinine 0.69 0.60 - 1.10 mg/dL    Calcium 9.2 8.5 - 10.5 mg/dL    Glucose 124 70 - 125 mg/dL    GFR Estimate 86 >60 mL/min/1.73m2   CBC with platelets and differential    Collection Time: 05/16/22  1:39 PM   Result Value Ref Range    WBC Count 4.6 4.0 - 11.0 10e3/uL    RBC Count 4.16 3.80 - 5.20 10e6/uL    Hemoglobin 12.8 11.7 - 15.7 g/dL    Hematocrit 40.7 35.0 - 47.0 %    MCV 98 78 - 100 fL    MCH 30.8 26.5 - 33.0 pg    MCHC 31.4 (L) 31.5 - 36.5 g/dL    RDW 12.0 10.0 - 15.0 %    Platelet Count 205 150 - 450 10e3/uL    % Neutrophils 69 %    % Lymphocytes 21 %    % Monocytes 8 %    % Eosinophils 1 %    % Basophils 1 %    % Immature Granulocytes 0 %    NRBCs per 100 WBC 0 <1 /100    Absolute Neutrophils 3.2 1.6 - 8.3 10e3/uL    Absolute Lymphocytes 1.0 0.8 - 5.3 10e3/uL    Absolute Monocytes 0.4 0.0 - 1.3 10e3/uL    Absolute Eosinophils 0.0 0.0 - 0.7 10e3/uL    Absolute Basophils 0.0 0.0 - 0.2 10e3/uL    Absolute Immature Granulocytes 0.0 <=0.4 10e3/uL    Absolute NRBCs 0.0 10e3/uL   Blood Culture Peripheral Blood    Collection Time: 05/16/22  4:03 PM    Specimen: Peripheral Blood   Result Value Ref Range    Culture No growth after 12 hours    Blood Culture Peripheral Blood    Collection Time: 05/16/22  4:03 PM    Specimen: Peripheral Blood   Result Value Ref Range    Culture No growth after 12 hours        Total time spent 60 minutes with greater than 50% in consultation, education and coordination of care.     Also discussed with RN.     Thank you for this consultation.    Pilar Boril APRN, FNP-C  Acute [facility] Pain [facility] Program  [facility] ([facility], [place], [place])  Monday-Friday 8a-4p    Page via online [employer] system or call [phone]

## 2022-05-17 NOTE — CONSULTS
ORTHOPEDIC CONSULTATION    Consultation  Mehreen Camara,  1940, MRN [id number]    [unfilled]  Fall, initial encounter [W19.XXXA]  Urinary tract infection without hematuria, site unspecified [N39.0]  Closed stable burst fracture of twelfth thoracic vertebra, initial encounter (H) [S22.487J]    PCP: Dede Pires, [phone]   Code status:  No CPR- Do NOT Intubate       Extended Emergency Contact Information  Primary Emergency Contact: Alysa Dowd  Address: [address] [place] [address] [facility]  Home Phone: [phone]  Work Phone: [phone]  Mobile Phone: [phone]  Relation: Daughter  Secondary Emergency Contact: Irasema Rajan  Address: [address] [place] [address] [facility]  Work Phone: [phone]  Mobile Phone: [phone]  Relation: Daughter         IMPRESSION:   1. Acute burst compression fracture T12   2. Osteoarthritis Right shoulder     PLAN:  This patient was discussed with Dr. Vargas, on-call surgeon for [facility] and they are in agreement with the following plan.     -Orthosis consult for TLSO.  -Wear TLSO at all times while out of bed if back pain present.  -Will continue to follow.  -Follow up with [facility] in 2-3 weeks for back, 2 weeks for right shoulder.    Thank you for including [facility] in the care of Mehreen Camara. It has been a pleasure participating in her care.    CHIEF COMPLAINT: back pain    HISTORY OF PRESENT ILLNESS:  The patient is seen in orthopedic consultation at the request of Dr. Jon. The patient is a pleasant 82 year old female with a past history of severe Right shoulder osteoarthritis on chronic pain medication and Lewy body dementia who presented to the ED yesterday after having an unwitnessed fall in the hallway where she resides at her memory care center. Family states that she had a recent change in her prescription eyeglasses and just had an  adjustment in her chronic pain medication and is wondering if they contributed to her fall. She normally uses a walker with 4 wheels for ambulation. While in the ED, xray revealed a T12 compression fracture and she was admitted for further evaluation. Patient just had pain medication and is sleepy, so history obtained from daughter and son. Daughter denies any new bowel or bladder incontinence. Has a history of tingling in her legs, but nothing new since injury. Per patient, pain is well controlled No other concerns. Patient quit smoking over 30 years ago. Furthermore, per daughter patient was scheduled for a Right TSA in December, but hasn't undergone that yet.    PAST MEDICAL HISTORY:  Past Medical History:   Diagnosis Date     AAA (abdominal aortic aneurysm) (H)      Abdominal pain, epigastric 12/16/2013     Abnormal computed tomography scan 9/15/2016     Abnormal finding on imaging 9/15/2016     Acute encephalopathy 9/26/2015     Adjustment disorder with anxious mood 9/17/2018     Adjustment disorder with mixed anxiety and depressed mood 9/17/2018     Adrenal adenoma     stable, thought not to be hormonally active     Adrenal adenoma      Agitation      Anemia 11/18/2010     Aneurysm of abdominal vessel (H) 2/25/2008    Created by [employer] Annotation: Jun 8 2011  8:07AM - Danni Ortiz: 4.4 on 11/2013.   Needs 6-12 month u/s or CT.  Replacement Utility updated for latest IMO load  Overview:  2/08 Abd US: 5.4 cm. 3/08 CT Abd: 2.7 x 3.3. 9/08:  MRI Abd: 2.7X3.2  Next 9/09.     Anxiety state 2/26/2008     Arthritis      Asymptomatic postmenopausal status     Created by Conversion  Replacement Utility updated for latest IMO load     Back pain 5/16/2020     Harris esophagus      Harris's esophagus 1/11/2012    Created by [employer] Annotation: Feb  3 2012  8:32AM - Cameron Obando: Needs EGD 2/2017   Overview:  Overview:  Created by [employer] Annotation: Feb  3 2012  8:32AM -  Cameron Obando: Needs EGD 2/2017     Benign neoplasm of ascending colon 9/19/2016     Bilateral knee pain 8/5/2020     Bloating 5/5/2017     Bradycardia 9/17/2018     Cataract 9/17/2018    Created by Conversion   Overview:  Overview:  Created by Conversion     Chest wall pain 1/20/2013     Chronic low back pain 9/29/2016     Cognitive and behavioral changes      Compression fracture of lumbar vertebra (H) 10/10/2020     Constipation      Contact dermatitis and eczema 11/22/2006     DDD (degenerative disc disease), lumbosacral 2/26/2008     Depression      Depression, major 7/13/2009     Depressive disorder      Diaphragmatic hernia 9/18/2018    Created by Conversion  Replacement Utility updated for latest IMO load  Overview:  Overview:  Created by Conversion  Replacement Utility updated for latest IMO load     Dietary counseling and surveillance 9/15/2016     Disorder of bone and cartilage 9/17/2018    Created by Conversion [facility] [facility] Annotation: Dec 13 2012  7:Roberta Padron: vit D/Ca, f/u  Dexa needed 1/2014.  Replacement Utility updated for latest IMO load  Overview:  Overview:  Created by Conversion [facility] Annotation: Dec 13 2012  7:41Roberta Kelly: vit D/Ca, f/u  Dexa needed 1/2014.  Replacement Utility updated for latest IMO load     Diverticulosis of colon 12/18/2014     Dizziness and giddiness     Created by Conversion      Dysphagia 12/17/2013     Early satiety 12/16/2013     Esophageal reflux     Created by Conversion      Essential hypertension 11/22/2006    Created by Conversion  Replacement Utility updated for latest IMO load  Overview:  Overview:  Created by Conversion  Replacement Utility updated for latest IMO load     Fall 8/5/2020     Flatulence, eructation and gas pain 2/19/2014     Gaseous abdominal distention 9/19/2016     Gastro-esophageal reflux disease with esophagitis 6/6/2017     Generalized muscle weakness 7/4/2018     GERD (gastroesophageal reflux disease)       Hemorrhage of rectum and anus 9/19/2016     Hiatal hernia      HLD (hyperlipidemia)      Hoarseness of voice 4/6/2010     HTN (hypertension)      Hypertension      Hypokalemia 6/29/2007     Hypothyroid      Hypothyroidism 9/22/2009    Created by Conversion  Replacement Utility updated for latest IMO load  Overview:  Overview:  Created by Conversion  Replacement Utility updated for latest IMO load     Insomnia due to anxiety and fear 9/17/2018     Insomnia due to mental condition      Irritable bowel syndrome 11/22/2006     Lower back pain      Major depressive disorder, recurrent episode (H) 9/17/2018    Created by Conversion  Replacement Utility updated for latest IMO load  Overview:  Overview:  Created by Conversion  Replacement Utility updated for latest IMO load     Major depressive disorder, single episode, moderate (H)      Malaise and fatigue 7/10/2007     Metabolic encephalopathy      Migraine with aura      Mixed hyperlipidemia 2/10/2008    Created by Conversion      Mood disorder due to a general medical condition      Movement disorder 9/19/2018     Multiple system atrophy P (H)      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     Murmur      Nonrheumatic aortic valve stenosis 10/23/2020     Nontoxic multinodular goiter 11/22/2006    Overview:  thyroidectomy 7/07 for atypical cells on FNA- benign cysts seen at time of surgical pathology     Obesity, unspecified     Created by Conversion      Orthostatic hypotension dysautonomic syndrome 9/22/2017     Osteoarthritis      Partial small bowel obstruction (H)      Polyp of colon 9/19/2016     Post-op pain 12/12/2018     Postoperative hypothyroidism 1/15/2013    Overview:  7/2/07-  Thyroidectomy by Dr Wang Crenshaw     Prediabetes 10/26/2010     Primary insomnia 7/4/2017     REM sleep behavior disorder 8/5/2020     Retinal migraine 4/28/2014     Rheumatic fever     thought to have had as a child     Rupture of left Achilles tendon, sequela 12/31/2019     S/P AAA  repair using bifurcation graft 11/30/2015     S/P laparoscopic cholecystectomy 12/12/2018     Scoliosis      Sleep difficulties      Small bowel obstruction (H)      Thyroid disease      Thyroid nodule     removed nodules and thyroid     Tinnitus     Created by Conversion  Replacement Utility updated for latest IMO load     Undiagnosed cardiac murmurs 11/22/2006     Vitamin D deficiency 1/15/2013     Weak 4/29/2018     Weakness 5/4/2017     Weakness of both lower extremities 7/5/2018     ALLERGIES:   Review of patient's allergies indicates   Allergies   Allergen Reactions     Penicillins Anaphylaxis, Rash and Shortness Of Breath     Aspirin Nausea and GI Disturbance     Atenolol Cough     Atorvastatin Muscle Pain (Myalgia) and Nausea and Vomiting     Bupropion Nausea     Cephalexin Rash     Localized to neck area     Clindamycin Other (See Comments)     Constipation      Codeine Nausea     Ibuprofen Nausea and GI Disturbance     Lovastatin Muscle Pain (Myalgia)     MEDICATIONS UPON ADMISSION:  Medications were reviewed.  They include:   Medications Prior to Admission   Medication Sig Dispense Refill Last Dose     acetaminophen (TYLENOL) 500 MG tablet Take 2 tablets (1,000 mg) by mouth 3 times daily   5/16/2022 at 7AM     bisacodyl (DULCOLAX) 10 MG suppository Place 10 mg rectally daily as needed for constipation   Unknown at PRN     Buprenorphine HCl (BELBUCA) 150 MCG FILM buccal film Place 150 mcg inside cheek 2 times daily   5/16/2022 at 7AM     carbidopa-levodopa (SINEMET)  MG tablet Take 1 tablet by mouth 2 times daily   5/16/2022 at 7AM     diclofenac (VOLTAREN) 1 % topical gel Apply 2 g topically 3 times daily as needed for moderate pain   Unknown at PRN     DULoxetine (CYMBALTA) 60 MG capsule Take 60 mg by mouth daily   5/16/2022 at 7AM     gabapentin (NEURONTIN) 300 MG capsule Take 600 mg by mouth At Bedtime   5/15/2022 at Unknown time     gabapentin (NEURONTIN) 300 MG capsule Take 300 mg by mouth 2  times daily AM and 1345   5/16/2022 at 7AM     hydrALAZINE (APRESOLINE) 10 MG tablet Take 5 mg by mouth 2 times daily   5/16/2022 at 7AM     lactobacillus rhamnosus, GG, (CULTURELL) capsule Take 1 capsule by mouth daily   5/16/2022 at am     levothyroxine (SYNTHROID/LEVOTHROID) 100 MCG tablet Take 1 tablet (100 mcg) by mouth daily   5/16/2022 at 7AM     lidocaine (LIDODERM) 5 % patch Place 1 patch onto the skin every 24 hours To prevent lidocaine toxicity, patient should be patch free for 12 hrs daily.   Unknown at never used     losartan (COZAAR) 50 MG tablet Take 50 mg by mouth 2 times daily   5/16/2022 at 7AM     naloxone (NARCAN) 4 MG/0.1ML nasal spray Spray 4 mg into one nostril alternating nostrils once as needed for opioid reversal every 2-3 minutes until assistance arrives   Unknown at never used     nystatin (MYCOSTATIN) 993537 UNIT/GM external cream Apply topically 2 times daily APPLY TOPICALLY TO AREA UNDER  right BREAST   5/16/2022 at 7AM     oxyCODONE (ROXICODONE) 5 MG tablet Take 5 mg by mouth 4 times daily as needed for severe pain (severe breakthrough pain rated 7)   Unknown at Unknown time     polyethylene glycol (MIRALAX) 17 g packet Take 17 g by mouth daily as needed for constipation If pt has not had a BM during the day   Unknown at Unknown time     polyethylene glycol (MIRALAX) 17 GM/Dose powder Take 17 g by mouth daily 510 g  5/16/2022 at am     polyethylene glycol-propylene glycol (SYSTANE ULTRA) 0.4-0.3 % SOLN ophthalmic solution Place 2 drops into both eyes every hour as needed for dry eyes   Unknown at Unknown time     polyethylene glycol-propylene glycol (SYSTANE ULTRA) 0.4-0.3 % SOLN ophthalmic solution Place 2 drops into both eyes 3 times daily (and additionally as needed/requested)   5/16/2022 at 7AM     QUEtiapine (SEROQUEL) 25 MG tablet Take 1 tablet (25 mg) by mouth At Bedtime   5/15/2022 at Unknown time     senna-docusate (SENOKOT-S/PERICOLACE) 8.6-50 MG tablet Take 1 tablet by  mouth daily   5/16/2022 at Unknown time     senna-docusate (SENOKOT-S/PERICOLACE) 8.6-50 MG tablet Take 1 tablet by mouth 2 times daily as needed for constipation   PRN     simethicone (MYLICON) 125 MG chewable tablet Take 125 mg by mouth 2 times daily   5/16/2022 at 7AM     Simethicone 125 MG TABS Take 1 chew tab by mouth 2 times daily as needed bloating   PRN     sodium phosphate (FLEET ENEMA) 7-19 GM/118ML rectal enema Place 1 enema rectally once as needed for constipation   PRN     vitamin D3 (CHOLECALCIFEROL) 50 mcg (2000 units) tablet Take 1 tablet (50 mcg) by mouth daily   5/16/2022 at 7AM         SOCIAL HISTORY:   she  reports that she quit smoking about 27 years ago. Her smoking use included cigarettes. She smoked 0.50 packs per day. She has never used smokeless tobacco. She reports that she does not drink alcohol and does not use drugs.    FAMILY HISTORY:  family history includes Cancer (age of onset: 57.00) in her brother; Coronary Artery Disease in her father and mother; Heart Disease in her father, mother, paternal aunt, and paternal uncle; Hypertension in her daughter, daughter, and mother; Lupus in her daughter; Neuropathy in her daughter; Other Cancer in her brother; Parkinsonism in her paternal uncle and another family member.      REVIEW OF SYSTEMS:   Reviewed with patient. See HPI, otherwise negative     PHYSICAL EXAMINATION:  Vitals: Temp:  [97.1  F (36.2  C)-98.4  F (36.9  C)] 97.1  F (36.2  C)  Pulse:  [68-99] 71  Resp:  [14-23] 18  BP: (106-212)/() 130/62  SpO2:  [90 %-95 %] 90 %  General: On examination, the patient is resting comfortably and sleepy (just received pain medication)  Pulses:  dorsalis pedis pulse is intact and equal bilaterally  Sensation: intact and equal bilaterally to the distal lower & upper extremities.  Tenderness: Back not assessed due to sleepiness   ROM: Moves all toes bilaterally, equal plantar & dorsiflexion bilaterally  Motor: 5+/5 plantar and dorsiflexion,  able to lift legs off bed slightly;  strength intact & equal bilaterally    RADIOGRAPHIC EVALUATION:  Personally reviewed.    5/16 Left Knee xray: Left total knee arthroplasty with patellar resurfacing. Components project in the expected position. No acute displaced periprosthetic fracture or finding for component failure. No significant left knee joint effusion. Distal left quadriceps   insertional enthesopathy at the patella. Arterial calcification.    5/16/22 Left femur xray:   1. Old healed pelvic fractures partially included in the field-of-view.  2. Total knee arthroplasty.  3. Arterial calcifications.  4. There is no evidence of a femur fracture or osteonecrosis.    5/16/22 Pelvis xray:   1. Old healed sacral and pubic ramus fractures.  2. Right total hip arthroplasty and vascular stents.  3. Diffuse bone demineralization. No evidence of an acute fracture.    HEAD CT:  1.  No acute intracranial abnormality.  2.  Mild left forehead soft tissue contusion without underlying acute calvarial fracture.  3.  Mild to moderate global brain parenchymal volume loss with presumed sequelae of mild chronic small vessel ischemic disease.     CERVICAL SPINE CT:  1.  No evidence for acute fracture or post traumatic subluxation of the cervical spine by CT imaging.  2.  No high-grade spinal canal stenosis.  3.  Severe degenerative neuroforaminal stenosis on the left at C3-C4 and C4-C5.     THORACIC SPINE CT:  1.  Acute burst-type compression fracture deformity of the T12 vertebral body with 50% height loss and 3 mm retropulsion. No posttraumatic spinal canal stenosis or segmental kyphosis.  2.  No high-grade spinal canal or neuroforaminal stenosis.     LUMBAR SPINE CT:  1.  No evidence for acute fracture or post traumatic subluxation of the lumbar spine by CT imaging.  2.  No high-grade spinal canal or neuroforaminal stenosis.      PERTINENT LABS:  Lab Results: personally reviewed.       Kailyn Olmedo PA-C, TANO  Date:  5/17/2022  Time: 1:32 PM    CC1:   Scot Jon DO    CC2:   Dede Piers

## 2022-05-17 NOTE — PLAN OF CARE
Goal Outcome Evaluation:           Pt alert to self. Appears sleepy overnight. Diaz in place. Schedule Tylenol given for pain.

## 2022-05-17 NOTE — PLAN OF CARE
Goal Outcome Evaluation:    Plan of Care Reviewed With: daughter     Patient has been sleeping for most of the morning. She was awoken for lunch and did eat some sips of soup and pudding. Patient's daughter and son were her to visit patient. Indwelling Diaz  Catheter is patient with simone colored urine. Patient has been carefully turned and repositioned throughout the shift.  Awaiting Spine Surgery Consult.

## 2022-05-17 NOTE — PROGRESS NOTES
S: Pt. seen at [facility] Rm 348. Female. Shara FREEDMAN. RX: TLSO custom. DX: T12 compression Fx..  O:A: Pt. seen and measured supine. Today I C/M for a Custom Clam shell TLSO to be made by Spinal tech. TLSO to provide triplanar control of the Spine.  P: Pt. to be seen tomorrow for F/D.    Romaine RIVERA,LO

## 2022-05-18 ENCOUNTER — APPOINTMENT (OUTPATIENT)
Dept: OCCUPATIONAL THERAPY | Facility: CLINIC | Age: 82
DRG: 551 | End: 2022-05-18
Attending: HOSPITALIST
Payer: COMMERCIAL

## 2022-05-18 PROBLEM — F02.818 LEWY BODY DEMENTIA WITH BEHAVIORAL DISTURBANCE (H): Status: ACTIVE | Noted: 2021-01-04

## 2022-05-18 LAB — BACTERIA UR CULT: NORMAL

## 2022-05-18 PROCEDURE — 250N000013 HC RX MED GY IP 250 OP 250 PS 637: Performed by: NURSE PRACTITIONER

## 2022-05-18 PROCEDURE — 120N000001 HC R&B MED SURG/OB

## 2022-05-18 PROCEDURE — 99232 SBSQ HOSP IP/OBS MODERATE 35: CPT | Performed by: HOSPITALIST

## 2022-05-18 PROCEDURE — 250N000013 HC RX MED GY IP 250 OP 250 PS 637: Performed by: HOSPITALIST

## 2022-05-18 PROCEDURE — 97167 OT EVAL HIGH COMPLEX 60 MIN: CPT | Mod: GO

## 2022-05-18 PROCEDURE — 99233 SBSQ HOSP IP/OBS HIGH 50: CPT | Performed by: NURSE PRACTITIONER

## 2022-05-18 PROCEDURE — 250N000011 HC RX IP 250 OP 636: Performed by: HOSPITALIST

## 2022-05-18 PROCEDURE — 250N000013 HC RX MED GY IP 250 OP 250 PS 637: Performed by: FAMILY MEDICINE

## 2022-05-18 PROCEDURE — 97535 SELF CARE MNGMENT TRAINING: CPT | Mod: GO

## 2022-05-18 RX ORDER — CIPROFLOXACIN 250 MG/1
250 TABLET, FILM COATED ORAL
Status: DISCONTINUED | OUTPATIENT
Start: 2022-05-18 | End: 2022-05-20

## 2022-05-18 RX ORDER — OXYCODONE HYDROCHLORIDE 5 MG/1
5 TABLET ORAL
Status: DISCONTINUED | OUTPATIENT
Start: 2022-05-18 | End: 2022-05-20

## 2022-05-18 RX ORDER — HYDRALAZINE HYDROCHLORIDE 10 MG/1
10 TABLET, FILM COATED ORAL 4 TIMES DAILY PRN
Status: DISCONTINUED | OUTPATIENT
Start: 2022-05-18 | End: 2022-05-20

## 2022-05-18 RX ADMIN — Medication 1 CAPSULE: at 09:19

## 2022-05-18 RX ADMIN — LOSARTAN POTASSIUM 50 MG: 50 TABLET, FILM COATED ORAL at 19:44

## 2022-05-18 RX ADMIN — SIMETHICONE 125 MG: 125 TABLET, CHEWABLE ORAL at 09:18

## 2022-05-18 RX ADMIN — LIDOCAINE: 50 OINTMENT TOPICAL at 09:23

## 2022-05-18 RX ADMIN — CIPROFLOXACIN HYDROCHLORIDE 250 MG: 250 TABLET, FILM COATED ORAL at 16:57

## 2022-05-18 RX ADMIN — SIMETHICONE 125 MG: 125 TABLET, CHEWABLE ORAL at 19:50

## 2022-05-18 RX ADMIN — POLYETHYLENE GLYCOL AND PROPYLENE GLYCOL 2 DROP: 4; 3 SOLUTION/ DROPS OPHTHALMIC at 22:12

## 2022-05-18 RX ADMIN — GABAPENTIN 600 MG: 300 CAPSULE ORAL at 22:20

## 2022-05-18 RX ADMIN — POLYETHYLENE GLYCOL AND PROPYLENE GLYCOL 2 DROP: 4; 3 SOLUTION/ DROPS OPHTHALMIC at 13:35

## 2022-05-18 RX ADMIN — ACETAMINOPHEN 1000 MG: 500 TABLET ORAL at 09:18

## 2022-05-18 RX ADMIN — ACETAMINOPHEN 1000 MG: 500 TABLET ORAL at 13:24

## 2022-05-18 RX ADMIN — LIDOCAINE: 50 OINTMENT TOPICAL at 13:24

## 2022-05-18 RX ADMIN — CIPROFLOXACIN 400 MG: 2 INJECTION, SOLUTION INTRAVENOUS at 03:53

## 2022-05-18 RX ADMIN — CARBIDOPA AND LEVODOPA 1 TABLET: 25; 100 TABLET ORAL at 19:42

## 2022-05-18 RX ADMIN — Medication 50 MCG: at 09:19

## 2022-05-18 RX ADMIN — GABAPENTIN 300 MG: 300 CAPSULE ORAL at 09:22

## 2022-05-18 RX ADMIN — CARBIDOPA AND LEVODOPA 1 TABLET: 25; 100 TABLET ORAL at 13:35

## 2022-05-18 RX ADMIN — LOSARTAN POTASSIUM 50 MG: 50 TABLET, FILM COATED ORAL at 09:20

## 2022-05-18 RX ADMIN — SENNOSIDES AND DOCUSATE SODIUM 1 TABLET: 50; 8.6 TABLET ORAL at 09:19

## 2022-05-18 RX ADMIN — GABAPENTIN 300 MG: 300 CAPSULE ORAL at 13:24

## 2022-05-18 RX ADMIN — ACETAMINOPHEN 1000 MG: 500 TABLET ORAL at 19:42

## 2022-05-18 RX ADMIN — OXYCODONE HYDROCHLORIDE 5 MG: 5 TABLET ORAL at 19:53

## 2022-05-18 RX ADMIN — Medication 5 MG: at 22:42

## 2022-05-18 RX ADMIN — NYSTATIN: 100000 CREAM TOPICAL at 22:12

## 2022-05-18 RX ADMIN — HYDRALAZINE HYDROCHLORIDE 10 MG: 10 TABLET ORAL at 16:58

## 2022-05-18 RX ADMIN — NYSTATIN: 100000 CREAM TOPICAL at 09:22

## 2022-05-18 RX ADMIN — OXYCODONE HYDROCHLORIDE 5 MG: 5 TABLET ORAL at 14:51

## 2022-05-18 RX ADMIN — DULOXETINE HYDROCHLORIDE 60 MG: 60 CAPSULE, DELAYED RELEASE ORAL at 09:18

## 2022-05-18 RX ADMIN — CARBIDOPA AND LEVODOPA 1 TABLET: 25; 100 TABLET ORAL at 09:18

## 2022-05-18 RX ADMIN — POLYETHYLENE GLYCOL AND PROPYLENE GLYCOL 2 DROP: 4; 3 SOLUTION/ DROPS OPHTHALMIC at 09:21

## 2022-05-18 RX ADMIN — Medication 5 MG: at 09:18

## 2022-05-18 RX ADMIN — DICLOFENAC 2 G: 10 GEL TOPICAL at 13:24

## 2022-05-18 RX ADMIN — LIDOCAINE: 50 OINTMENT TOPICAL at 22:12

## 2022-05-18 RX ADMIN — LEVOTHYROXINE SODIUM 100 MCG: 0.05 TABLET ORAL at 05:43

## 2022-05-18 RX ADMIN — QUETIAPINE FUMARATE 25 MG: 25 TABLET ORAL at 22:20

## 2022-05-18 RX ADMIN — POLYETHYLENE GLYCOL 3350 17 G: 17 POWDER, FOR SOLUTION ORAL at 19:46

## 2022-05-18 ASSESSMENT — ACTIVITIES OF DAILY LIVING (ADL)
ADLS_ACUITY_SCORE: 40
ADLS_ACUITY_SCORE: 40
ADLS_ACUITY_SCORE: 41
ADLS_ACUITY_SCORE: 40

## 2022-05-18 NOTE — PROGRESS NOTES
"Care Management Follow Up  Additional Information:  Chart reviewed. Awaiting TLSO brace. PT/OT pending.   [facility] ([phone])- CM left VM for admissions requesting return call to . No return call to  today.   Daughter Alysa is [phone] unable to reach today. 4:53 PM Daughter returned call to . States can be difficult to connect with staffing at [facility]- \"I don't even know if there is one, they have had 4 teams of management in the past year, the new director of nursing has been there for about 6 weeks\".     Daughter wanting placement at [facility], does not want [facility]. OK with referral being sent to [facility].   Daughter anticipates will need HE transport, discussed would likely require stretcher due to dementia.   Kate Wisdom, RN          "

## 2022-05-18 NOTE — PLAN OF CARE
Problem: Risk for Delirium  Goal: Improved Sleep  Outcome: Ongoing, Progressing     Problem: Violence Risk or Actual  Goal: Anger and Impulse Control  Outcome: Ongoing, Progressing   Goal Outcome Evaluation:      Pt Alert to self and intermittently situation~ knows she fell and hurt herself. VSS ex hypertensive (175/81), recheck BP improved (153/83). Diaz in place with low output. Q2 turns overnight- TSLO brace to  be delivered today (fitted 5/17). Plan to discharge back to memory care when medically stable.

## 2022-05-18 NOTE — PLAN OF CARE
Problem: Fracture Stabilization and Management (Orthopaedic Fracture)  Goal: Fracture Stability  Outcome: Ongoing, Progressing   Goal Outcome Evaluation:      Oriented to self only. Bedrest per ortho until TLSO brace arrives. Turned and repositioned patient.

## 2022-05-18 NOTE — PROGRESS NOTES
"Orthopedic Progress Note      Assessment:    1. Acute burst compression fracture T12   2. Osteoarthritis Right shoulder    Plan:   - TLSO to be placed today.  - Continue PT/OT.  - Weightbearing status: WBAT with TLSO at all times while out of bed if back pain present.  - Pain team following.   - Anticoagulation: SCDs, vicente stockings and ambulation.  - Discharge planning: pending TLSO placement, pain management, PT eval after TLSO placed    Subjective:  Pain: well controlled on Tylenol, oxy, belbuca, neurontin  Nausea, Vomiting:  no  Lightheadedness, Dizziness:  no  Neuro:  Patient denies new onset numbness or paresthesias    Patient much less sleepy today. Pain well controlled. TLSO hasn't arrived yet. No bowel or bladder incontinence. No other new concerns per patient & daughters.    Objective:  BP (!) 186/90 (BP Location: Left arm)   Pulse 60   Temp 97.1  F (36.2  C) (Oral)   Resp 12   Ht 1.676 m (5' 6\")   Wt 68 kg (150 lb)   SpO2 93%   BMI 24.21 kg/m    The patient is A&Ox3. Appears comfortable.   Sensation is intact in bilateral lower extremities  Dorsiflexion and plantar flexion is intact in bilateral lower extremities  Dorsalis pedis pulses intact bilaterally  Calves are soft and non-tender. Negative Cricket's.    Pertinent Labs   Lab Results: personally reviewed.   Lab Results   Component Value Date    INR 1.04 12/24/2020     Lab Results   Component Value Date    WBC 4.6 05/16/2022    HGB 12.8 05/16/2022    HCT 40.7 05/16/2022    MCV 98 05/16/2022     05/16/2022     Lab Results   Component Value Date     05/16/2022    CO2 29 05/16/2022     Report completed by:  Kailyn Olmedo PA-C  [facility]    Date: 5/18/2022  Time: 2:57 PM    "

## 2022-05-18 NOTE — PROGRESS NOTES
S: Pt. seen at [facility] Rm 348. Female. Shara WHITFIELD RX: TLSO custom. DX: T12 compression Fx..  O:A: Pt. seen and measured supine. Today I F/D a Custom Clam shell TLSO made by Spinal tech. TLSO provides triplanar control of the Spine. Pt. did not want to stand at the fitting. Pt. to work with PT tomorrow. Overall fit is good. Pt. was in good spirits for the fitting. Donning doffing wear and care instructions given to Pt., Daughter and Nurse.  P: Pt. to be seen as needed.    Romiane RIVERA,LO

## 2022-05-18 NOTE — PROGRESS NOTES
[facility] Medicine PROGRESS NOTE      Identification/Summary:   Mehreen Camara is a 82 year old old female who presented to the emergency department for fall at her nursing facility.  ED evaluation with T12 compression fracture.  Patient was too painful to sit up or ambulate.  Other imaging was negative for acute fracture.  She has a history of Lewy body dementia, multiple system atrophy. Having ongoing intermittent sundowning, agitation, and delirium.  Already takes Neurontin and Seroquel at bedtime, with ativan given here judiciously as needed. PT and OT initial evals are still pending as of this morning; and disposition pending several factors including PT/OT evals, and CM/SW actively following for placement.      Assessment and Plan:  Unwitnessed fall with T12 compression fracture  She is opiate tolerant.  Scheduled Tylenol, higher dose oxycodone as needed, PT and OT evaluations  Spine consult ordered in ED to give further recommendations  IV Dilaudid as needed for breakthrough pain  Monitor neuro status of lower extremities  Noted acute pain team already consulted on 5/17 and following, much appreciated.      Lewy body dementia  Parkinson's disease? Multiple system atrophy  Chronic pain with opiate tolerance  Acute metabolic encephalopathy, delirium  As an outpatient she takes buprenorphine twice daily, Sinemet, duloxetine, gabapentin, oxycodone, Seroquel.  Continue her usual outpatient medications but increase the dose of oxycodone due to acute pain  Consult pain team much appreciated, as above.   Increased sinemet to tid, pt and dtr notice improvement with higher dose, recently decreased     Essential hypertension  Blood pressure quite elevated initially, improved now  As an outpatient she takes losartan, hydralazine, will continue these     Chronic right shoulder pain     Abnormal urinalysis  Difficult to assess for symptoms, white count only slightly elevated and urine  She does report some  increased urinary frequency  Continue ciprofloxacin as started in emergency department, follow urine culture    Systolic murmur  Mild aortic stenosis in 2018  Can have this further evaluated as an outpatient, no recent echo in our system     DVT proph: Mechanical  Code Status: DNR as listed by several previous hospital stays  Disposition: Observation   Diet: Regular  Pain tx: Tylenol, oxycodone, buprenorphine  PT/OT: Both ordered    Discharge: pending several factors including PT/OT evals, and CM/SW actively following for eventual placement.       Landon Rhodes MD  Internal Medicine  Hospitalist  [facility]  Phone: #[phone]      Interval History/Subjective:  No acute events overnight.    Woken up easily. Working with OT on memory testing, SLUMS. No chest pain, no shortness of breath. No other new complaints reported. Discussed with OT.       Physical Exam/Objective:  GENERAL:  Alert, appears comfortable, in no acute distress, appears stated age   HEAD:  Normocephalic, without obvious abnormality, atraumatic   EYES:  Conjunctiva/corneas clear, no scleral icterus, EOM's appears intact grossly   NOSE: Nares normal, septum midline, mucosa normal, no drainage   THROAT: Lips, mucosa, and tongue appear normal   NECK: Symmetrical, trachea appears midline   BACK:   Symmetric, no curvature noted   LUNGS:   Symmetric chest rise on inhalation, respirations unlabored   CHEST WALL:  No apparent deformity   HEART:  No thrills or heaves visualized   ABDOMEN:   No masses or organomegaly apparent; non-scaphoid   EXTREMITIES: Extremities appear normal, atraumatic, no cyanosis   SKIN: No exanthems in the visualized areas   NEURO: Alert, awake, doing SLUMS testing, moves all four extremities freely and spontaneously   PSYCH: Cooperative, behavior is appropriate     Medications:   Personally Reviewed.  Medications     sodium chloride 50 mL/hr at 05/17/22 1510       acetaminophen  1,000 mg Oral TID      Buprenorphine HCl  150 mcg Buccal BID     carbidopa-levodopa  1 tablet Oral TID     ciprofloxacin  400 mg Intravenous Q12H     DULoxetine  60 mg Oral Daily     gabapentin  300 mg Oral BID     gabapentin  600 mg Oral At Bedtime     hydrALAZINE  5 mg Oral BID     lactobacillus rhamnosus (GG)  1 capsule Oral Daily     levothyroxine  100 mcg Oral Daily     lidocaine   Topical 4x Daily     losartan  50 mg Oral BID     nystatin   Topical BID     polyethylene glycol  17 g Oral Daily     polyethylene glycol-propylene glycol PF  2 drop Both Eyes TID     QUEtiapine  25 mg Oral At Bedtime     senna-docusate  1 tablet Oral Daily     simethicone  125 mg Oral BID     vitamin D3  50 mcg Oral Daily       Data reviewed today: I personally reviewed all new medications, labs, imaging/diagnostics reports over the past 24 hours. Pertinent findings include:    Imaging:   No results found for this or any previous visit (from the past 24 hour(s)).    Labs:  Most Recent 3 CBC's:Recent Labs   Lab Test 05/16/22  1339 02/27/22  1316 01/29/21  1049 01/29/21  0000 01/04/21  0627   WBC 4.6 5.4  --   --  5.4   HGB 12.8 12.7 12.3   < > 10.5*   MCV 98 98  --   --  101*    210  --   --  316    < > = values in this interval not displayed.     Most Recent 3 BMP's:Recent Labs   Lab Test 05/16/22  1339 02/27/22  1316 01/29/21  1049    141 141   POTASSIUM 4.1 4.1 4.0   CHLORIDE 105 104 103   CO2 29 27 33*   BUN 18 14 20   CR 0.69 0.61 0.62   ANIONGAP 9 10 5   YADIRA 9.2 8.9 9.1    98 96     Most Recent 2 LFT's:Recent Labs   Lab Test 02/27/22  1316 01/04/21  0627   AST 11 14   ALT <9 <9   ALKPHOS 57 124*   BILITOTAL 0.5 0.3     Most Recent 3 INR's:Recent Labs   Lab Test 12/24/20  0127   INR 1.04

## 2022-05-18 NOTE — PROVIDER NOTIFICATION
Remote provider paged regarding low Bps - order to hold hydralazine and losartan for systolic BP < 110.

## 2022-05-18 NOTE — PROGRESS NOTES
[facility] ACUTE PAIN SERVICE    Daily PAIN Progress Note    Assessment/Plan:  Mehreen Camara is a 82 year old female who was admitted on 5/16/2022.  Pain team was asked to see the patient for acute fracture pain on chronic low back pain, patient on Belbuca and oxycodone chronically. Admitted for left-sided hip pain status post mechanical fall at her assisted living facility. History of Lewy body dementia, multiple system atrophy, falls, traumatic injuries due to falls.  Describes pain as 2-3/10 and aching. The patient does not smoke and denies chemical dependency history.      Allergies: Aspirin, bupropion, codeine, ibuprofen     Opioid Induced Respiratory Depression Risk Assessment:?High  (Low 0-1; Moderate 2-4; or High >4 or >/= 3 if two of the risk factors are age > 60 and opioid naive) due to the following risk factors: HARIS, COPD/Asthma/pulmonary disease, CHF, renal dysfunction, hepatic dysfunction, Obesity, Smoker, Age>60, >2 opioid therapies, concomitant CNS depressants, opioid naive status, or post surgical    Home MME:  Belbuca 150 mcg bid in addition to oxycodone 5 mg qid prn. Belbuca recently increased from 75 mcg bid 1-2 weeks ago. See  below.  ?     PLAN:   1) Pain is consistent with fracture pain, chronic pain. Labs and imaging indicated: Urinalysis indicates UTI, CT thoracic spine with acute T12 fracture, CT chest abdomen pelvis with chronic appearing L4 fracture, x-ray without acute pelvic fracture, CT head with no acute intracranial abnormality, CT cervical spine with no evidence for acute fracture or posttraumatic subluxation, CT lumbar spine no evidence of acute fracture. Treatment plan includes multimodal pain approach, ice, PT, OT, Spine surgery consult, TLSO per ortho. Patient educated regarding Multimodal pain approach, medications as listed below. Patient is understanding of the plan. All questions and concerns addressed to patient's satisfaction.   2)Multimodal Medication  Therapy  Topical: Lidocaine ointment 4 times daily, Voltaren qid  NSAID'S: CrCl 67.5.  None -allergies noted to aspirin, ibuprofen  Muscle Relaxants: None  Adjuvants: APAP TID, gabapentin [phone]  Antidepressants/anxiolytics: Duloxetine 60 mg daily, carbidopa levodopa  mg 3 times daily, Seroquel 25 mg at bedtime  Opioids: Buprenorphine buccal film 150 mcg twice daily - decrease to 75 mcg bid due to ongoing lethargy and increasing confusion over the past week with recent increase in dose, oxycodone 5 to 10 mg every 4 hours as needed- has not used, decrease to 5 mg q3h prn  -[place]  pulled from system on 5/17/2022. This indicates   5/5/2022 Belbuca 150 mcg film #28  4/14/2022 Belbuca 75 mcg film #60  IV Pain medication: None   3)Non-medication interventions: Ice, TLSO brace  Acupuncture consult - offered and declined  Integrative consult - offered and declined  4)Constipation Prophylaxis: Scheduled and prn: MiraLAX daily, senna docusate daily, as needed sup, as needed MiraLAX, as needed senna docusate, as needed Fleet enema  5) Care Teams: Hospital Medicine Service, spine, pain     -Opioid prescriber has been at [facility]  -[place]  pulled from system on 5/17/2022. This indicates   5/5/2022 Belbuca 150 mcg film #28  4/14/2022 Belbuca 75 mcg film #60  4/1/2022 gabapentin 300 mg #112  3/9/2022 Belbuca 75 mcg film #60  Has had fills for oxycodone 5 mg tablets in June, July, September, November 2021  Discharge Recommendations - We recommend prescribing the following at the time of discharge: Decreased Belbuca dose, APAP, topicals if effective for acute pain. Intranasal Naloxone recommended and has script per [facility] review. Follow up Primary Care Provider, Orthopedics, Pain clinic.     Will continue to monitor response to decreased Belbuca dosing and will need to update pain clinic with pertinent changes.     Subjective:  Patient seen resting in bed. She appears lethargic, she  "wakes briefly to light touch and soft voice but falls back asleep. Patient was noted to has RASS score of -2 to -3 yesterday and today -1 to -2. Has not ate her breakfast near her bedside. Diaz catheter in. She denies chest pain, shortness of breath, nausea, vomiting, constipation, diarrhea.     Discussed with daughter Alysa over the phone. Per daughter patient takes Belbuca 150 mcg bid for chronic low back pain in addition to oxycodone 5 mg qid prn for BTP. Belbuca was recently increased 1 week ago from 75 mcg bid to now 150 mcg bid. Furthermore, the Sinemet dose was also increased recently to bid and now tid here. Over the past week family members have noted patient to be more lethargic and confused. Patient has an appointment with her pain clinic today to assess the dose increase but she is currently admitted.      Active Problems:    Major depressive disorder, recurrent episode (H)    Hypertension    Fall    Lewy body dementia with behavioral disturbance (H)    Urinary tract infection without hematuria, site unspecified    Closed stable burst fracture of twelfth thoracic vertebra, initial encounter (H)      Objective:  Vital signs in last 24 hours:  BP (!) 186/90 (BP Location: Left arm)   Pulse 60   Temp 97.1  F (36.2  C) (Oral)   Resp 12   Ht 1.676 m (5' 6\")   Wt 68 kg (150 lb)   SpO2 93%   BMI 24.21 kg/m    Weight:   Vitals:    05/16/22 1246   Weight: 68 kg (150 lb)      Weight change:   Body mass index is 24.21 kg/m .    Intake/Output last 3 shifts:  I/O last 3 completed shifts:  In: 410 [P.O.:410]  Out: 1225 [Urine:1225]  Intake/Output this shift:  I/O this shift:  In: 240 [P.O.:240]  Out: -     Review of Systems:   As per subjective, all others negative.    Physical Exam:  General Appearance:  RASS -1, lethargic, no distress, appears comfortable, does wake briefly to voice to answer questions and then falls back asleep.    Head:  Normocephalic, without obvious abnormality, atraumatic, wears " glasses   Eyes:  PERRL, conjunctiva/corneas clear, EOM's intact   Nose: Nares normal, septum midline   Throat: Lips, mucosa, and tongue normal   Neck: Supple, symmetrical, trachea midline   Back:   Symmetric, no curvature, ROM normal   Lungs:   Clear to auscultation bilaterally, room air, respirations unlabored   Chest Wall:  No tenderness or deformity   Heart:  Regular rate and rhythm, S1, S2 normal    Abdomen:   Soft, non-tender, bowel sounds active all four quadrants, bravo   Extremities: Extremities normal   Skin: Skin warm, dry    Neurologic: Lethargic, oriented to person, month, reports year is 1942, reports she is in the hospital but does not know name of hospital, reports she is in [facility], Moves all 4 extremities, follows commands appropriately,  BLE weakness     Imaging: Reviewed      Labs: Reviewed   No results found for this or any previous visit (from the past 24 hour(s)).      Total time spent 35 minutes with greater than 50% in consultation, education and coordination of care.     Also discussed with RN, MD, pharmacist, patient's daughter Alysa Andrade MALI STEPHENP-C  Acute Care Pain Management Program  [facility] ([place], [place], [place])  Monday-Friday 8a-4p   Page via online paging system or call [phone]

## 2022-05-18 NOTE — PLAN OF CARE
Problem: Plan of Care - These are the overarching goals to be used throughout the patient stay.    Goal: Plan of Care Review/Shift Note  Description: The Plan of Care Review/Shift note should be completed every shift.  The Outcome Evaluation is a brief statement about your assessment that the patient is improving, declining, or no change.  This information will be displayed automatically on your shift note.  Outcome: Ongoing, Progressing   Goal Outcome Evaluation:    Patient A/Ox2; disoriented to time and place. 4/10 pain controlled with scheduled meds. Tolerating regular diet. Repositioning Q2H. Diaz in place and draining. Fitted for TLSO brace this shift. On alarms for safety.

## 2022-05-19 ENCOUNTER — APPOINTMENT (OUTPATIENT)
Dept: PHYSICAL THERAPY | Facility: CLINIC | Age: 82
DRG: 551 | End: 2022-05-19
Attending: HOSPITALIST
Payer: COMMERCIAL

## 2022-05-19 PROCEDURE — 250N000011 HC RX IP 250 OP 636: Performed by: INTERNAL MEDICINE

## 2022-05-19 PROCEDURE — 99231 SBSQ HOSP IP/OBS SF/LOW 25: CPT | Performed by: HOSPITALIST

## 2022-05-19 PROCEDURE — 97116 GAIT TRAINING THERAPY: CPT | Mod: GP

## 2022-05-19 PROCEDURE — 250N000013 HC RX MED GY IP 250 OP 250 PS 637: Performed by: HOSPITALIST

## 2022-05-19 PROCEDURE — 250N000013 HC RX MED GY IP 250 OP 250 PS 637: Performed by: NURSE PRACTITIONER

## 2022-05-19 PROCEDURE — 99232 SBSQ HOSP IP/OBS MODERATE 35: CPT | Performed by: NURSE PRACTITIONER

## 2022-05-19 PROCEDURE — 97530 THERAPEUTIC ACTIVITIES: CPT | Mod: GP

## 2022-05-19 PROCEDURE — 97162 PT EVAL MOD COMPLEX 30 MIN: CPT | Mod: GP

## 2022-05-19 PROCEDURE — 120N000001 HC R&B MED SURG/OB

## 2022-05-19 PROCEDURE — 250N000013 HC RX MED GY IP 250 OP 250 PS 637: Performed by: FAMILY MEDICINE

## 2022-05-19 RX ORDER — CARBIDOPA AND LEVODOPA 25; 100 MG/1; MG/1
1 TABLET ORAL 3 TIMES DAILY
Qty: 90 TABLET | Refills: 0 | DISCHARGE
Start: 2022-05-19 | End: 2022-07-14

## 2022-05-19 RX ORDER — HYDRALAZINE HYDROCHLORIDE 20 MG/ML
5 INJECTION INTRAMUSCULAR; INTRAVENOUS ONCE
Status: COMPLETED | OUTPATIENT
Start: 2022-05-19 | End: 2022-05-19

## 2022-05-19 RX ORDER — HYDRALAZINE HYDROCHLORIDE 10 MG/1
10 TABLET, FILM COATED ORAL 4 TIMES DAILY PRN
DISCHARGE
Start: 2022-05-19 | End: 2022-05-20

## 2022-05-19 RX ORDER — OXYCODONE HYDROCHLORIDE 5 MG/1
5 TABLET ORAL 4 TIMES DAILY PRN
Qty: 11 TABLET | Refills: 0 | Status: SHIPPED | OUTPATIENT
Start: 2022-05-19 | End: 2022-05-25

## 2022-05-19 RX ADMIN — CARBIDOPA AND LEVODOPA 1 TABLET: 25; 100 TABLET ORAL at 15:12

## 2022-05-19 RX ADMIN — DULOXETINE HYDROCHLORIDE 60 MG: 60 CAPSULE, DELAYED RELEASE ORAL at 08:41

## 2022-05-19 RX ADMIN — OLANZAPINE 2.5 MG: 5 TABLET, ORALLY DISINTEGRATING ORAL at 18:50

## 2022-05-19 RX ADMIN — HYDRALAZINE HYDROCHLORIDE 5 MG: 20 INJECTION INTRAMUSCULAR; INTRAVENOUS at 04:43

## 2022-05-19 RX ADMIN — CIPROFLOXACIN HYDROCHLORIDE 250 MG: 250 TABLET, FILM COATED ORAL at 16:41

## 2022-05-19 RX ADMIN — LIDOCAINE: 50 OINTMENT TOPICAL at 10:12

## 2022-05-19 RX ADMIN — GABAPENTIN 600 MG: 300 CAPSULE ORAL at 21:49

## 2022-05-19 RX ADMIN — DICLOFENAC 2 G: 10 GEL TOPICAL at 12:07

## 2022-05-19 RX ADMIN — DICLOFENAC 2 G: 10 GEL TOPICAL at 06:52

## 2022-05-19 RX ADMIN — BUPRENORPHINE HYDROCHLORIDE 75 MCG: 75 FILM, SOLUBLE BUCCAL at 10:15

## 2022-05-19 RX ADMIN — POLYETHYLENE GLYCOL AND PROPYLENE GLYCOL 2 DROP: 4; 3 SOLUTION/ DROPS OPHTHALMIC at 15:12

## 2022-05-19 RX ADMIN — HYDRALAZINE HYDROCHLORIDE 10 MG: 10 TABLET ORAL at 00:59

## 2022-05-19 RX ADMIN — POLYETHYLENE GLYCOL AND PROPYLENE GLYCOL 2 DROP: 4; 3 SOLUTION/ DROPS OPHTHALMIC at 08:19

## 2022-05-19 RX ADMIN — LIDOCAINE: 50 OINTMENT TOPICAL at 16:41

## 2022-05-19 RX ADMIN — OXYCODONE HYDROCHLORIDE 5 MG: 5 TABLET ORAL at 12:04

## 2022-05-19 RX ADMIN — CARBIDOPA AND LEVODOPA 1 TABLET: 25; 100 TABLET ORAL at 19:45

## 2022-05-19 RX ADMIN — LOSARTAN POTASSIUM 50 MG: 50 TABLET, FILM COATED ORAL at 08:20

## 2022-05-19 RX ADMIN — OXYCODONE HYDROCHLORIDE 5 MG: 5 TABLET ORAL at 02:45

## 2022-05-19 RX ADMIN — SIMETHICONE 125 MG: 125 TABLET, CHEWABLE ORAL at 19:44

## 2022-05-19 RX ADMIN — ACETAMINOPHEN 1000 MG: 500 TABLET ORAL at 19:44

## 2022-05-19 RX ADMIN — GABAPENTIN 300 MG: 300 CAPSULE ORAL at 15:12

## 2022-05-19 RX ADMIN — DICLOFENAC 2 G: 10 GEL TOPICAL at 19:45

## 2022-05-19 RX ADMIN — ACETAMINOPHEN 1000 MG: 500 TABLET ORAL at 08:38

## 2022-05-19 RX ADMIN — NYSTATIN: 100000 CREAM TOPICAL at 19:46

## 2022-05-19 RX ADMIN — LOSARTAN POTASSIUM 50 MG: 50 TABLET, FILM COATED ORAL at 19:44

## 2022-05-19 RX ADMIN — ACETAMINOPHEN 1000 MG: 500 TABLET ORAL at 15:12

## 2022-05-19 RX ADMIN — GABAPENTIN 300 MG: 300 CAPSULE ORAL at 08:45

## 2022-05-19 RX ADMIN — POLYETHYLENE GLYCOL AND PROPYLENE GLYCOL 2 DROP: 4; 3 SOLUTION/ DROPS OPHTHALMIC at 19:45

## 2022-05-19 RX ADMIN — Medication 5 MG: at 21:51

## 2022-05-19 RX ADMIN — BUPRENORPHINE HYDROCHLORIDE 75 MCG: 75 FILM, SOLUBLE BUCCAL at 21:50

## 2022-05-19 RX ADMIN — QUETIAPINE FUMARATE 25 MG: 25 TABLET ORAL at 21:49

## 2022-05-19 RX ADMIN — DICLOFENAC 2 G: 10 GEL TOPICAL at 15:12

## 2022-05-19 RX ADMIN — HYDRALAZINE HYDROCHLORIDE 10 MG: 10 TABLET ORAL at 08:17

## 2022-05-19 RX ADMIN — LIDOCAINE: 50 OINTMENT TOPICAL at 15:13

## 2022-05-19 RX ADMIN — OXYCODONE HYDROCHLORIDE 5 MG: 5 TABLET ORAL at 16:41

## 2022-05-19 RX ADMIN — NYSTATIN: 100000 CREAM TOPICAL at 12:07

## 2022-05-19 RX ADMIN — CARBIDOPA AND LEVODOPA 1 TABLET: 25; 100 TABLET ORAL at 08:15

## 2022-05-19 RX ADMIN — CIPROFLOXACIN HYDROCHLORIDE 250 MG: 250 TABLET, FILM COATED ORAL at 06:51

## 2022-05-19 RX ADMIN — LIDOCAINE: 50 OINTMENT TOPICAL at 22:05

## 2022-05-19 RX ADMIN — LEVOTHYROXINE SODIUM 100 MCG: 0.05 TABLET ORAL at 06:50

## 2022-05-19 ASSESSMENT — ACTIVITIES OF DAILY LIVING (ADL)
ADLS_ACUITY_SCORE: 40
ADLS_ACUITY_SCORE: 43
ADLS_ACUITY_SCORE: 40
ADLS_ACUITY_SCORE: 42
ADLS_ACUITY_SCORE: 44
ADLS_ACUITY_SCORE: 44
ADLS_ACUITY_SCORE: 42
ADLS_ACUITY_SCORE: 40

## 2022-05-19 NOTE — PLAN OF CARE
Problem: Risk for Delirium  Goal: Improved Attention and Thought Clarity  Outcome: Ongoing, Not Progressing     Problem: Risk for Delirium  Goal: Improved Sleep  Outcome: Ongoing, Not Progressing   Goal Outcome Evaluation:      Patient did not sleep on shift. Oriented only to self. PRN oxy given x1 for pain. Patient shifting weight in bed. Not OOB on shift.     BP elevated 's. PRN oral hydralazine given. Temporarily came down to  then went back to 200's. Provider notified. Dr. Stroud ordered 1x dose of IV hydralazine. SBP rechecked at 160's. Will continue to monitor.

## 2022-05-19 NOTE — PROGRESS NOTES
[facility] ACUTE PAIN SERVICE    Daily PAIN Progress Note    Assessment/Plan:  Mehreen Camara is a 82 year old female who was admitted on 5/16/2022.  Pain team was asked to see the patient for acute fracture pain on chronic low back pain, patient on Belbuca and oxycodone chronically. Admitted for left-sided hip pain status post mechanical fall at her assisted living facility. History of Lewy body dementia, multiple system atrophy, falls, traumatic injuries due to falls.  Describes pain as 4-5/10 and aching. The patient does not smoke and denies chemical dependency history.      Allergies: Aspirin, bupropion, codeine, ibuprofen     Opioid Induced Respiratory Depression Risk Assessment:?High  (Low 0-1; Moderate 2-4; or High >4 or >/= 3 if two of the risk factors are age > 60 and opioid naive) due to the following risk factors: HARIS, COPD/Asthma/pulmonary disease, CHF, renal dysfunction, hepatic dysfunction, Obesity, Smoker, Age>60, >2 opioid therapies, concomitant CNS depressants, opioid naive status, or post surgical    Home MME:  Belbuca 150 mcg bid in addition to oxycodone 5 mg qid prn. Belbuca recently increased from 75 mcg bid 1-2 weeks ago. See  below.  ?     In 24 hours patient has used 15 mg oxycodone and 75 mg Belbuca.     PLAN:   1) Pain is consistent with fracture pain, chronic pain. Labs and imaging indicated: CT thoracic spine with acute T12 fracture, Treatment plan includes multimodal pain approach, ice, PT, OT, Spine surgery consult, TLSO per ortho. Patient educated regarding Multimodal pain approach, medications as listed below. Plans for discharge to TCU when able.   2)Multimodal Medication Therapy  Topical: Lidocaine ointment 4 times daily, Voltaren qid  NSAID'S: CrCl 67.5.  None -allergies noted to aspirin, ibuprofen  Muscle Relaxants: None  Adjuvants: APAP TID, gabapentin [phone]  Antidepressants/anxiolytics: Duloxetine 60 mg daily, carbidopa levodopa  mg 3 times daily,  Seroquel 25 mg at bedtime  Opioids: Buprenorphine was decreased to 75 mcg bid due to ongoing lethargy and increasing confusion over the past week with recent increase in dose, oxycodone 5 mg q3h prn  IV Pain medication: None   3)Non-medication interventions: Ice, TLSO brace  Acupuncture consult - offered and declined  Integrative consult - offered and declined  4)Constipation Prophylaxis: Scheduled and prn: MiraLAX daily, senna docusate daily, as needed sup, as needed MiraLAX, as needed senna docusate, as needed Fleet enema  5) Care Teams: Hospital Medicine Service, spine, pain     -Opioid prescriber has been at [facility]  pulled from system on 5/17/2022. This indicates   5/5/2022 Belbuca 150 mcg film #28  4/14/2022 Belbuca 75 mcg film #60  4/1/2022 gabapentin 300 mg #112  3/9/2022 Belbuca 75 mcg film #60  Has had fills for oxycodone 5 mg tablets in June, July, September, November 2021  Discharge Recommendations - We recommend prescribing the following at the time of discharge: Decreased Belbuca dose, APAP, topicals if effective for acute pain. Intranasal Naloxone recommended and has script per [facility] review. Follow up Primary Care Provider, Orthopedics, Pain clinic.     Called Pain clinic to update on changes to Belbuca dose 5/19/22.     Subjective:  Patient seen sitting up in chair, brace on. She appears lethargic, she wakes briefly to light touch and soft voice but falls back asleep and keeps eyes closed during visit. Rambling, tangential thoughts, not answering all questions appropriately.  When asked she reports pain in between her shoulders. She denies nausea, chest pain, shortness of breath.     Active Problems:    Major depressive disorder, recurrent episode (H)    Hypertension    Fall    Lewy body dementia with behavioral disturbance (H)    Urinary tract infection without hematuria, site unspecified    Closed stable burst fracture of twelfth thoracic vertebra,  "initial encounter (H)      Objective:  Vital signs in last 24 hours:  /68 (BP Location: Right arm)   Pulse 73   Temp 97.4  F (36.3  C) (Oral)   Resp 16   Ht 1.676 m (5' 6\")   Wt 68 kg (150 lb)   SpO2 93%   BMI 24.21 kg/m    Weight:     Vitals:    05/16/22 1246   Weight: 68 kg (150 lb)      Weight change:   Body mass index is 24.21 kg/m .    Intake/Output last 3 shifts:  I/O last 3 completed shifts:  In: 650 [P.O.:650]  Out: 1850 [Urine:1850]  Intake/Output this shift:  I/O this shift:  In: 40 [P.O.:40]  Out: 290 [Urine:290]    Review of Systems:   As per subjective, all others negative.    Physical Exam:  General Appearance:  Lethargic, no distress, appears comfortable, does wake briefly to voice to answer questions and then falls back asleep, keeps eyes closed during visit    Head:  Normocephalic, without obvious abnormality, atraumatic, wears glasses   Eyes:  PERRL, conjunctiva/corneas clear, EOM's intact   Nose: Nares normal, septum midline   Throat: Lips, mucosa, and tongue normal   Neck: Supple, symmetrical, trachea midline   Back:   Symmetric, no curvature, ROM normal   Lungs:   Clear to auscultation bilaterally, room air, respirations unlabored   Chest Wall:  No tenderness or deformity   Heart:  Regular rate and rhythm, S1, S2 normal    Abdomen:   Soft, non-tender, bowel sounds active all four quadrants, bravo   Extremities: Extremities normal   Skin: Skin warm, dry    Neurologic: Lethargic, oriented to person, tangential thoughts/speech      Imaging: Reviewed      Labs: Reviewed   No results found for this or any previous visit (from the past 24 hour(s)).      Total time spent 25 minutes with greater than 50% in consultation, education and coordination of care.     Also discussed with RN, MD, Pain Clinic [facility]     RAMON Cruz  Acute Care Pain Management Program  [facility] ([place], [place], [place])  Monday-Friday 8a-4p   Page via online paging system or " call [phone]

## 2022-05-19 NOTE — PLAN OF CARE
Problem: Fracture Stabilization and Management (Orthopaedic Fracture)  Goal: Fracture Stability  Outcome: Ongoing, Progressing     Problem: Pain (Orthopaedic Fracture)  Goal: Acceptable Pain Control  Outcome: Ongoing, Progressing   Goal Outcome Evaluation:    Pt Alert to self. Reports having pain to lower back, prn oxy given. Pt ate about 50% of lunch, needed a lot of encouragement and direction. Transfers with astx2, TLSO brace on when OOB. Pt's daughter and son visited today. Bed & chair alarm on, call light within reach.

## 2022-05-19 NOTE — PROGRESS NOTES
"Orthopedic Progress Note      Assessment:    1. Acute burst compression fracture T12   2. Osteoarthritis Right shoulder    Plan:   - Continue PT/OT.  - Weightbearing status: WBAT with TLSO at all times while out of bed if back pain present.  - Pain team following.   - Anticoagulation: SCDs, vicente stockings and ambulation.  - Discharge planning: pending placement, possibly today    Subjective:  Pain: well controlled on Tylenol, oxy, chino, belbuca  Nausea, Vomiting:  no  Lightheadedness, Dizziness:  no  Neuro:  Patient denies new onset numbness or paresthesias in bilateral lower extremities    Wearing TLSO when up. Ambulating in hallways. Back pain well controlled currently. No new concerns.    Objective:  /68 (BP Location: Right arm)   Pulse 73   Temp 97.4  F (36.3  C) (Oral)   Resp 16   Ht 1.676 m (5' 6\")   Wt 68 kg (150 lb)   SpO2 93%   BMI 24.21 kg/m    The patient is A&Ox3. Appears comfortable.   Sensation is intact in bilateral lower extremities  Dorsiflexion and plantar flexion is intact in bilateral lower extremities  Dorsalis pedis pulses intact bilaterally  Calves are soft and non-tender. Negative Cricket's.    Pertinent Labs   Lab Results: personally reviewed.   Lab Results   Component Value Date    INR 1.04 12/24/2020     Lab Results   Component Value Date    WBC 4.6 05/16/2022    HGB 12.8 05/16/2022    HCT 40.7 05/16/2022    MCV 98 05/16/2022     05/16/2022     Lab Results   Component Value Date     05/16/2022    CO2 29 05/16/2022     Report completed by:  Kailyn Olmedo PA-C  [facility]        "

## 2022-05-19 NOTE — PROGRESS NOTES
Care Management Follow Up    Length of Stay (days): 3    Expected Discharge Date: 05/19/2022     Concerns to be Addressed:  Placement     Patient plan of care discussed at interdisciplinary rounds: Yes    Anticipated Discharge Disposition:  TCU VS Return to [facility]     Anticipated Discharge Services:  TBD   Anticipated Discharge DME:  TBD     Patient/family educated on Medicare website which has current facility and service quality ratings:  Yes   Education Provided on the Discharge Plan:  Yes   Patient/Family in Agreement with the Plan:  Yes     Referrals Placed by CM/SW:  TCU  Private pay costs discussed: Yes     Additional Information:  GIRISH CM left VM for nurse at [facility] [phone] early this am.  Nandini the Nurse returned call and more information sent and Nandini will assess to see if they can meet Pt's needs or if Pt will need short stay in TCU.     11:33 AM  Nandini called back and feels that Pt should go to a TCU before returning. GIRISH left VM for daughter Alysa for more TCU choices.     12:19 PM  GIRISH talked with Eladio and wanted referral sent to [facility]. GIRISH met with Pt's son who was visiting and gave list of TCU's and will review.     2:15 PM  GIRISH called and spoke with Kelin at [facility] and are assessing.    NAM Morrow

## 2022-05-19 NOTE — PROGRESS NOTES
[facility] Medicine PROGRESS NOTE      Identification/Summary:   Mehreen Camara is a 82 year old old female who presented to the emergency department for fall at her nursing facility.  ED evaluation with T12 compression fracture.  Patient was too painful to sit up or ambulate.  Other imaging was negative for acute fracture.  She has a history of Lewy body dementia, multiple system atrophy. Having ongoing intermittent sundowning, agitation, and delirium.  Already takes Neurontin and Seroquel at bedtime, with ativan given here judiciously as needed. PT and OT initial evals are still pending as of this morning; and disposition pending several factors including PT/OT evals, and CM/SW actively following for placement. Buprenorphine dosing/prescription for discharge as per pain team; did discuss with pain team directly. TCU discharge orders and printed scripts signed for anticipated discharge as long as bed/placement can occur later today.      Assessment and Plan:  Unwitnessed fall with T12 compression fracture  She is opiate tolerant.  Scheduled Tylenol, higher dose oxycodone as needed, PT and OT evaluations  Spine consult ordered in ED to give further recommendations  IV Dilaudid as needed for breakthrough pain  Monitor neuro status of lower extremities  Noted acute pain team already consulted on 5/17 and following, much appreciated.      Lewy body dementia  Parkinson's disease? Multiple system atrophy  Chronic pain with opiate tolerance  Acute metabolic encephalopathy, delirium  As an outpatient she takes buprenorphine twice daily, Sinemet, duloxetine, gabapentin, oxycodone, Seroquel.  Continue her usual outpatient medications but increase the dose of oxycodone due to acute pain  Consult pain team much appreciated, as above.   Increased sinemet to tid, pt and dtr notice improvement with higher dose, recently decreased     Essential hypertension  Blood pressure quite elevated initially, improved now  As an  outpatient she takes losartan, hydralazine, will continue these     Chronic right shoulder pain     Abnormal urinalysis  Difficult to assess for symptoms, white count only slightly elevated and urine  She does report some increased urinary frequency  Continue ciprofloxacin as started in emergency department, follow urine culture    Systolic murmur  Mild aortic stenosis in 2018  Can have this further evaluated as an outpatient, no recent echo in our system     DVT proph: Mechanical  Code Status: DNR as listed by several previous hospital stays  Disposition: Observation   Diet: Regular  Pain tx: Tylenol, oxycodone, buprenorphine  PT/OT: Both ordered    Discharge: TCU discharge orders and printed scripts signed for anticipated discharge as long as bed/placement can occur later today.  Buprenorphine dosing/prescription for discharge as per pain team; did discuss with pain team directly. TCU discharge orders and printed scripts signed (oxycodone) and again pain team will sign buprenorphine at discharge.      Landon Rhodes MD  Internal Medicine  Hospitalist  [facility]  Phone: #[phone]      Interval History/Subjective:  No acute events overnight.    No new complaints. She is excited to leave the hospital. She is in good spirits. Discussed plan of care with patient. Answered all questions to patient's verbalized and stated understanding and satisfaction.      Physical Exam/Objective:  GENERAL:  Alert, appears comfortable, in no acute distress, appears stated age, sitting in chair   HEAD:  Normocephalic, without obvious abnormality, atraumatic   EYES:  Conjunctiva/corneas clear, no scleral icterus, EOM's appears intact grossly   NOSE: Nares normal, septum midline, mucosa normal, no drainage   THROAT: Lips, mucosa, and tongue appear normal   NECK: Symmetrical, trachea appears midline   BACK:   Symmetric, no curvature noted   LUNGS:   Symmetric chest rise on inhalation, respirations unlabored    CHEST WALL:  No apparent deformity   HEART:  No thrills or heaves visualized   ABDOMEN:   No masses or organomegaly apparent; non-scaphoid   EXTREMITIES: Extremities appear normal, atraumatic, no cyanosis   SKIN: No exanthems in the visualized areas   NEURO: Alert, awake, moves all four extremities freely and spontaneously while upright in chair   PSYCH: Cooperative, behavior is appropriate     Medications:   Personally Reviewed.  Medications       acetaminophen  1,000 mg Oral TID     Buprenorphine HCl  75 mcg Buccal Q12H PERLA     carbidopa-levodopa  1 tablet Oral TID     ciprofloxacin  250 mg Oral BID AC     diclofenac  2 g Topical 4x Daily     DULoxetine  60 mg Oral Daily     gabapentin  300 mg Oral BID     gabapentin  600 mg Oral At Bedtime     hydrALAZINE  5 mg Oral BID     lactobacillus rhamnosus (GG)  1 capsule Oral Daily     levothyroxine  100 mcg Oral Daily     lidocaine   Topical 4x Daily     losartan  50 mg Oral BID     nystatin   Topical BID     polyethylene glycol  17 g Oral Daily     polyethylene glycol-propylene glycol PF  2 drop Both Eyes TID     QUEtiapine  25 mg Oral At Bedtime     senna-docusate  1 tablet Oral Daily     simethicone  125 mg Oral BID     vitamin D3  50 mcg Oral Daily       Data reviewed today: I personally reviewed all new medications, labs, imaging/diagnostics reports over the past 24 hours. Pertinent findings include:    Imaging:   No results found for this or any previous visit (from the past 24 hour(s)).    Labs:  Most Recent 3 CBC's:  Recent Labs   Lab Test 05/16/22  1339 02/27/22  1316 01/29/21  1049 01/29/21  0000 01/04/21  0627   WBC 4.6 5.4  --   --  5.4   HGB 12.8 12.7 12.3   < > 10.5*   MCV 98 98  --   --  101*    210  --   --  316    < > = values in this interval not displayed.     Most Recent 3 BMP's:  Recent Labs   Lab Test 05/16/22  1339 02/27/22  1316 01/29/21  1049    141 141   POTASSIUM 4.1 4.1 4.0   CHLORIDE 105 104 103   CO2 29 27 33*   BUN 18 14 20    CR 0.69 0.61 0.62   ANIONGAP 9 10 5   YADIRA 9.2 8.9 9.1    98 96     Most Recent 2 LFT's:  Recent Labs   Lab Test 02/27/22  1316 01/04/21  0627   AST 11 14   ALT <9 <9   ALKPHOS 57 124*   BILITOTAL 0.5 0.3     Most Recent 3 INR's:  Recent Labs   Lab Test 12/24/20  0127   INR 1.04

## 2022-05-19 NOTE — PROGRESS NOTES
05/19/22 1300   Quick Adds   Type of Visit Initial PT Evaluation   Living Environment   People in Home alone   Current Living Arrangements assisted living  (memory care)   Self-Care   Usual Activity Tolerance moderate   Current Activity Tolerance fair   Equipment Currently Used at Home walker, rolling  (4WW and FWW)   General Information   Onset of Illness/Injury or Date of Surgery 05/16/22   Referring Physician Landon Rhodes MD   Patient/Family Therapy Goals Statement (PT) Return to home   Pertinent History of Current Problem (include personal factors and/or comorbidities that impact the POC) burst fracture 12th thoracic vertebra   Existing Precautions/Restrictions spinal   Weight-Bearing Status - LUE full weight-bearing   Weight-Bearing Status - RUE full weight-bearing   Weight-Bearing Status - LLE full weight-bearing   Weight-Bearing Status - RLE full weight-bearing   Bed Mobility   Bed Mobility sit-supine   Sit-Supine [place] (Bed Mobility) verbal cues;moderate assist (50% patient effort);contact guard   Impairments Contributing to Impaired Bed Mobility pain;decreased strength;decreased ROM   Transfers   Transfers sit-stand transfer   Maintains Weight-bearing Status (Transfers) able to maintain   Transfer Safety Concerns Noted decreased step length   Impairments Contributing to Impaired Transfers pain;decreased ROM;decreased strength   Sit-Stand Transfer   Sit-Stand [place] (Transfers) verbal cues;contact guard;moderate assist (50% patient effort)   Assistive Device (Sit-Stand Transfers) walker, front-wheeled   Gait/Stairs (Locomotion)   [place] Level (Gait) contact guard  (CGA x2)   Assistive Device (Gait) walker, front-wheeled   Distance in Feet (Required for LE Total Joints) 1 x 8, 1 x 150'   Pattern (Gait) step-through;swing-through   Deviations/Abnormal Patterns (Gait) gait speed decreased;valeria decreased   Maintains Weight-bearing Status (Gait) able to maintain   Clinical Impression    Criteria for Skilled Therapeutic Intervention Yes, treatment indicated   PT Diagnosis (PT) impaired functional mobility   Influenced by the following impairments weakness, pain   Functional limitations due to impairments gait, transfers   Clinical Presentation (PT Evaluation Complexity) Stable/Uncomplicated   Clinical Presentation Rationale pt presents as medically diagnosed   Clinical Decision Making (Complexity) low complexity   Planned Therapy Interventions (PT) home exercise program;strengthening;transfer training   Anticipated Equipment Needs at Discharge (PT) walker, rolling   Risk & Benefits of therapy have been explained evaluation/treatment results reviewed   PT Discharge Planning   PT Discharge Recommendation (DC Rec) (S)  Transitional Care Facility   PT Rationale for DC Rec increased assistance needed for gait/transfers, fear of falling   Plan of Care Review   Plan of Care Reviewed With patient   Total Evaluation Time   Total Evaluation Time (Minutes) 10   Physical Therapy Goals   PT Frequency Daily   PT Predicted Duration/Target Date for Goal Attainment 05/24/22   PT Goals Transfers;Gait   PT: Transfers Modified independent;Sit to/from stand;Assistive device;Within precautions   PT: Gait Supervision/stand-by assist;Rolling walker;Within precautions;150 feet

## 2022-05-19 NOTE — PLAN OF CARE
Problem: Fracture Stabilization and Management (Orthopaedic Fracture)  Goal: Fracture Stability  Outcome: Ongoing, Progressing     Pt oriented to self only. Oxycodone given x1 this shift for pain. Patient pivoted to chair and commode this shift while wearing TLSO brace with assist of 2. Patient pulling at brace straps at times stating it was uncomfortable. Bps elevated this shift - prn hydralazine 10 mg given along with scheduled BP meds. BP recheck 208/93 - requested PRN hydralazine from pharmacy for tonight.

## 2022-05-20 LAB
ANION GAP SERPL CALCULATED.3IONS-SCNC: 10 MMOL/L (ref 5–18)
BUN SERPL-MCNC: 15 MG/DL (ref 8–28)
CALCIUM SERPL-MCNC: 9.3 MG/DL (ref 8.5–10.5)
CHLORIDE BLD-SCNC: 106 MMOL/L (ref 98–107)
CO2 SERPL-SCNC: 31 MMOL/L (ref 22–31)
CREAT SERPL-MCNC: 0.69 MG/DL (ref 0.6–1.1)
ERYTHROCYTE [DISTWIDTH] IN BLOOD BY AUTOMATED COUNT: 11.9 % (ref 10–15)
GFR SERPL CREATININE-BSD FRML MDRD: 86 ML/MIN/1.73M2
GLUCOSE BLD-MCNC: 87 MG/DL (ref 70–125)
HCT VFR BLD AUTO: 44.5 % (ref 35–47)
HGB BLD-MCNC: 13.7 G/DL (ref 11.7–15.7)
MCH RBC QN AUTO: 31 PG (ref 26.5–33)
MCHC RBC AUTO-ENTMCNC: 30.8 G/DL (ref 31.5–36.5)
MCV RBC AUTO: 101 FL (ref 78–100)
PLATELET # BLD AUTO: 182 10E3/UL (ref 150–450)
POTASSIUM BLD-SCNC: 4 MMOL/L (ref 3.5–5)
RBC # BLD AUTO: 4.42 10E6/UL (ref 3.8–5.2)
SODIUM SERPL-SCNC: 147 MMOL/L (ref 136–145)
WBC # BLD AUTO: 4.7 10E3/UL (ref 4–11)

## 2022-05-20 PROCEDURE — 80048 BASIC METABOLIC PNL TOTAL CA: CPT | Performed by: INTERNAL MEDICINE

## 2022-05-20 PROCEDURE — 85027 COMPLETE CBC AUTOMATED: CPT | Performed by: INTERNAL MEDICINE

## 2022-05-20 PROCEDURE — 250N000011 HC RX IP 250 OP 636: Performed by: INTERNAL MEDICINE

## 2022-05-20 PROCEDURE — 93010 ELECTROCARDIOGRAM REPORT: CPT | Performed by: INTERNAL MEDICINE

## 2022-05-20 PROCEDURE — 250N000013 HC RX MED GY IP 250 OP 250 PS 637: Performed by: FAMILY MEDICINE

## 2022-05-20 PROCEDURE — 250N000013 HC RX MED GY IP 250 OP 250 PS 637: Performed by: HOSPITALIST

## 2022-05-20 PROCEDURE — 99232 SBSQ HOSP IP/OBS MODERATE 35: CPT | Performed by: INTERNAL MEDICINE

## 2022-05-20 PROCEDURE — 36415 COLL VENOUS BLD VENIPUNCTURE: CPT | Performed by: INTERNAL MEDICINE

## 2022-05-20 PROCEDURE — 93005 ELECTROCARDIOGRAM TRACING: CPT

## 2022-05-20 PROCEDURE — 93005 ELECTROCARDIOGRAM TRACING: CPT | Performed by: INTERNAL MEDICINE

## 2022-05-20 PROCEDURE — 120N000001 HC R&B MED SURG/OB

## 2022-05-20 RX ORDER — ENALAPRILAT 1.25 MG/ML
1.25 INJECTION INTRAVENOUS EVERY 6 HOURS PRN
Status: DISCONTINUED | OUTPATIENT
Start: 2022-05-20 | End: 2022-05-25 | Stop reason: HOSPADM

## 2022-05-20 RX ORDER — GABAPENTIN 300 MG/1
300 CAPSULE ORAL AT BEDTIME
Qty: 30 CAPSULE | Refills: 0 | Status: SHIPPED | OUTPATIENT
Start: 2022-05-20 | End: 2023-01-01 | Stop reason: DRUGHIGH

## 2022-05-20 RX ORDER — GABAPENTIN 300 MG/1
300 CAPSULE ORAL AT BEDTIME
Status: DISCONTINUED | OUTPATIENT
Start: 2022-05-20 | End: 2022-05-25 | Stop reason: HOSPADM

## 2022-05-20 RX ORDER — HYDRALAZINE HYDROCHLORIDE 25 MG/1
25 TABLET, FILM COATED ORAL EVERY 8 HOURS SCHEDULED
Status: DISCONTINUED | OUTPATIENT
Start: 2022-05-20 | End: 2022-05-25 | Stop reason: HOSPADM

## 2022-05-20 RX ORDER — HYDRALAZINE HYDROCHLORIDE 20 MG/ML
10 INJECTION INTRAMUSCULAR; INTRAVENOUS EVERY 4 HOURS PRN
Status: DISCONTINUED | OUTPATIENT
Start: 2022-05-20 | End: 2022-05-25 | Stop reason: HOSPADM

## 2022-05-20 RX ORDER — HYDRALAZINE HYDROCHLORIDE 25 MG/1
25 TABLET, FILM COATED ORAL EVERY 8 HOURS
Qty: 30 TABLET | Refills: 0 | Status: SHIPPED | OUTPATIENT
Start: 2022-05-20 | End: 2022-11-07

## 2022-05-20 RX ADMIN — SIMETHICONE 125 MG: 125 TABLET, CHEWABLE ORAL at 19:29

## 2022-05-20 RX ADMIN — CARBIDOPA AND LEVODOPA 1 TABLET: 25; 100 TABLET ORAL at 19:30

## 2022-05-20 RX ADMIN — LOSARTAN POTASSIUM 50 MG: 50 TABLET, FILM COATED ORAL at 19:29

## 2022-05-20 RX ADMIN — NYSTATIN: 100000 CREAM TOPICAL at 19:31

## 2022-05-20 RX ADMIN — LIDOCAINE: 50 OINTMENT TOPICAL at 16:04

## 2022-05-20 RX ADMIN — DICLOFENAC 2 G: 10 GEL TOPICAL at 19:31

## 2022-05-20 RX ADMIN — ACETAMINOPHEN 1000 MG: 500 TABLET ORAL at 19:29

## 2022-05-20 RX ADMIN — HYDRALAZINE HYDROCHLORIDE 10 MG: 20 INJECTION INTRAMUSCULAR; INTRAVENOUS at 12:42

## 2022-05-20 RX ADMIN — POLYETHYLENE GLYCOL AND PROPYLENE GLYCOL 2 DROP: 4; 3 SOLUTION/ DROPS OPHTHALMIC at 19:31

## 2022-05-20 ASSESSMENT — ACTIVITIES OF DAILY LIVING (ADL)
ADLS_ACUITY_SCORE: 43
ADLS_ACUITY_SCORE: 49
ADLS_ACUITY_SCORE: 43
ADLS_ACUITY_SCORE: 43
ADLS_ACUITY_SCORE: 49
ADLS_ACUITY_SCORE: 43
ADLS_ACUITY_SCORE: 49
ADLS_ACUITY_SCORE: 49
ADLS_ACUITY_SCORE: 43

## 2022-05-20 NOTE — PROVIDER NOTIFICATION
Notified MD that patient will not wake up today. Family is here and concerned. Updated MD on high /102. IV BP med ordered.  Per MD; now paging pain team to adjust pt's medications as combination of gabapentin, oxycodone, belbuca, zyprexa, and seroquel likely contributing.

## 2022-05-20 NOTE — PLAN OF CARE
"Patient vital signs are at baseline: Patient has intermittently elevated BP readings.   Patient able to ambulate as they were prior to admission or with assist devices provided by therapies during their stay:  Yes  Patient MUST void prior to discharge:  Yes-uses bedside commode, purewick in place overnight.   Patient able to tolerate oral intake:  Yes  Pain has adequate pain control using Oral analgesics:  Yes  Does patient have an identified :  No, patient will be going to TCU for therapy and safety.  Has goal D/C date and time been discussed with patient: TCU is pending bed availability.     Bed alarm on for safety, patient is pleasant and cooperative, forgetful but easily redirectable. She is able to recall details about discharge planning, and hoping to go to a \"nice place\". She does remember that she injured her back when she had a fall. Family is involved. Scheduled medications given with applesauce. Awake much of the night, so is sleeping this morning.    Sherie Betancur RN          "

## 2022-05-20 NOTE — PROGRESS NOTES
"Care Management Follow Up    Length of Stay (days): 4    Expected Discharge Date: 05/23/2022     Concerns to be Addressed:  Plan- Palliative Consult     Patient plan of care discussed at interdisciplinary rounds: Yes    Anticipated Discharge Disposition:  TBD      Anticipated Discharge Services:  TBD   Anticipated Discharge DME:  TBD     Patient/family educated on Medicare website which has current facility and service quality ratings:  Yes   Education Provided on the Discharge Plan:  Yes   Patient/Family in Agreement with the Plan:  Undetermined     Referrals Placed by CM/SW:  TCU  Private pay costs discussed: No     Additional Information:  [facility] called and are unable to accept Pt.  GIRISH met with daughter Eladio who was visiting with Pt to discuss possible Hospice options.  GIRISH tried to explain Hospice and options.  Daughter would like to meet with Palliative Care.  Eladio would also like to discuss options with her mother and \"it may take a while for her to understand.\"  GIRISH received call from [facility]  [facility] asking how Pt is doing.  [facility] gave [facility] Nurse station phone number to assist with answering specific questions.  Danni states \"Unable to take Pt back without a plan.\"       NAM Morrow        "

## 2022-05-20 NOTE — PROGRESS NOTES
"Orthopedic Progress Note      Assessment:    1. Acute burst compression fracture T12   2. Osteoarthritis Right shoulder    Plan:   - Continue PT/OT. Recommend TCU.  - Weightbearing status: WBAT with TLSO at all times while out of bed if back pain present.  - Pain team following. On buprenorphine. Oxy.  - Anticoagulation: SCDs, vicente stockings and ambulation.  - Discharge planning: pending placement, possibly today    Subjective:  Pain: well controlled on Tylenol, oxy, chino, belbuca  Nausea, Vomiting:  no  Lightheadedness, Dizziness:  no  Neuro:  Patient denies new onset numbness or paresthesias in bilateral lower extremities    Wearing TLSO when up. Ambulating in hallways. Back pain well controlled currently. No new concerns.    Objective:  BP (!) 230/106 (BP Location: Left arm, Patient Position: Semi-Sibley's, Cuff Size: Adult Regular)   Pulse 81   Temp 97.8  F (36.6  C) (Oral)   Resp 16   Ht 1.676 m (5' 6\")   Wt 68 kg (150 lb)   SpO2 92%   BMI 24.21 kg/m    The patient is A&Ox3. Appears comfortable.   Sensation is intact in bilateral lower extremities  Dorsiflexion and plantar flexion is intact in bilateral lower extremities  Dorsalis pedis pulses intact bilaterally  Calves are soft and non-tender. Negative Cricket's.    Pertinent Labs   Lab Results: personally reviewed.   Lab Results   Component Value Date    INR 1.04 12/24/2020     Lab Results   Component Value Date    WBC 4.6 05/16/2022    HGB 12.8 05/16/2022    HCT 40.7 05/16/2022    MCV 98 05/16/2022     05/16/2022     Lab Results   Component Value Date     05/16/2022    CO2 29 05/16/2022     Report completed by:  Kailyn Olmedo PA-C  [facility]        "

## 2022-05-20 NOTE — PROGRESS NOTES
[facility] Medicine PROGRESS NOTE      Identification/Summary:   Mehreen Camara is a 82 year old old female who presented to the emergency department for fall at her nursing facility.  ED evaluation with T12 compression fracture.  Patient was too painful to sit up or ambulate.  Other imaging was negative for acute fracture.  She has a history of Lewy body dementia, multiple system atrophy. Having ongoing intermittent sundowning, agitation, and delirium.  Already takes Neurontin and Seroquel at bedtime, with ativan given here judiciously as needed. PT and OT initial evals are still pending as of this morning; and disposition pending several factors including PT/OT evals, and CM/SW actively following for placement. Buprenorphine dosing/prescription for discharge as per pain team;      Assessment and Plan:  Unwitnessed fall with T12 compression fracture  She is opiate tolerant.  Scheduled Tylenol, higher dose oxycodone as needed, PT and OT evaluations  Spine consult ordered in ED to give further recommendations  IV Dilaudid as needed for breakthrough pain  Monitor neuro status of lower extremities  Noted acute pain team already consulted on 5/17 and following, much appreciated.   As per pain medicine     Multimodal Medication Therapy  Topical: Lidocaine ointment 4 times daily, Voltaren qid  NSAID'S: CrCl 67.5.  None -allergies noted to aspirin, ibuprofen  Muscle Relaxants: None  Adjuvants: APAP TID, gabapentin [phone]  Antidepressants/anxiolytics: Duloxetine 60 mg daily, carbidopa levodopa  mg 3 times daily, Seroquel 25 mg at bedtime  Opioids: Buprenorphine was decreased to 75 mcg bid due to ongoing lethargy and increasing confusion over the past week with recent increase in dose, oxycodone 5 mg q3h prn  IV Pain medication: None   3)Non-medication interventions: Ice, TLSO brace  Acupuncture consult - offered and declined  Integrative consult - offered and declined  4)Constipation Prophylaxis:  Scheduled and prn: MiraLAX daily, senna docusate daily, as needed sup, as needed MiraLAX, as needed senna docusate, as needed Fleet enema    TLSO brace ordered by orthopedic surgery  Patient is too somnolent this morning and was unable to wake up and was unable to take oral pills  Most likely secondary to polypharmacy  Buprenorphine, Zyprexa, oxycodone, gabapentin, Seroquel contributing to it    Requested nurse to call pain medicine team to come by and adjust her medications and some of these medications    Stopped PRN zyprexa   REduced dose of gabapentin at bedtime to 300mg from 600mg   Reduced dose of seroquel from 25mg to 12.5mg PO at bedtime PRN for agitation   Reduced dose of oxycodone from 5mg to 2.5mg q 6hours PRN for pain   These scripts need to be adjusted when pt is reday for discharge       Blood pressure is also high as patient was unable to take her blood pressure pills today p.o. IV as needed hydralazine, IV vasotec ordered      Addendum:  Patient more awake and alert and talking  this afternoon   Pleasantly confused and asking what we are doing to her     Goals of care discussion;  Discussed with daughter over the phone and as well as personally met her this afternoon patient with a history of Parkinson's disease Lewy body dementia multisystem atrophy and autonomic dysfunction resulting in high and low blood pressures has been declining overall over the last few years  Patient was complaining of a lot of pain and also declining significantly functionally  Multiple family members had discussions and they are interested in hospice enrollment if patient will qualify for hospice     Social work consultation requested for hospice referral  Discussed with charge nurse about this request     Lewy body dementia  Parkinson's disease? Multiple system atrophy  Chronic pain with opiate tolerance  Acute metabolic encephalopathy, delirium  As an outpatient she takes buprenorphine twice daily, Sinemet, duloxetine,  gabapentin, oxycodone, Seroquel.  Continue her usual outpatient medications but increase the dose of oxycodone due to acute pain  Consult pain team much appreciated, as above.   Increased sinemet to tid, pt and dtr notice improvement with higher dose, recently decreased     Essential hypertension  Blood pressure quite elevated initially, improved now  As an outpatient she takes losartan, hydralazine, will continue these  Patient with significantly elevated blood pressure needing IV hydralazine overnight  Increase PTA hydralazine from 5mg twice daily to 25 mg p.o. 3 times daily today     Chronic right shoulder pain     Abnormal urinalysis  Difficult to assess for symptoms, white count only slightly elevated and urine  She does report some increased urinary frequency  Continue ciprofloxacin as started in emergency department, follow urine culture    Systolic murmur  Mild aortic stenosis in 2018  Can have this further evaluated as an outpatient, no recent echo in our system     DVT proph: Mechanical  Code Status: DNR as listed by several previous hospital stays    Diet: Regular  Pain tx: Tylenol, oxycodone, buprenorphine  PT/OT: Both ordered    Discharge: As per the prior hospitalist TCU discharge orders and printed scripts signed for anticipated discharge as long as bed/placement can occur later today.  Buprenorphine dosing/prescription for discharge as per pain team; did discuss with pain team directly. TCU discharge orders and printed scripts signed (oxycodone) and again pain team will sign buprenorphine at discharge.    Consult for hospice referral sent.  Most likely discharge after enrollment with hospice in the next 3 to 4 days    Maya Montero MD   Hospitalist  [facility]  Page [phone](7AM-6PM)      Interval History/Subjective:  No acute events overnight blood pressure was running high systolics up to 190s to 200s needing IV hydralazine.    No new complaints.  Resting comfortably in  bed.  Awaiting placement    Physical Exam/Objective:  GENERAL:  Alert, appears comfortable, in no acute distress, appears stated age, sitting in chair   HEAD:  Normocephalic, without obvious abnormality, atraumatic   EYES:  Conjunctiva/corneas clear, no scleral icterus, EOM's appears intact grossly   NOSE: Nares normal, septum midline, mucosa normal, no drainage   THROAT: Lips, mucosa, and tongue appear normal   NECK: Symmetrical, trachea appears midline   BACK:   Symmetric, no curvature noted   LUNGS:   Symmetric chest rise on inhalation, respirations unlabored   CHEST WALL:  No apparent deformity   HEART:  No thrills or heaves visualized   ABDOMEN:   No masses or organomegaly apparent; non-scaphoid   EXTREMITIES: Extremities appear normal, atraumatic, no cyanosis   SKIN: No exanthems in the visualized areas   NEURO: Alert, awake, moves all four extremities freely and spontaneously while upright in chair   PSYCH: Cooperative, behavior is appropriate     Medications:   Personally Reviewed.  Medications       acetaminophen  1,000 mg Oral TID     Buprenorphine HCl  75 mcg Buccal Q12H PERLA     carbidopa-levodopa  1 tablet Oral TID     ciprofloxacin  250 mg Oral BID AC     diclofenac  2 g Topical 4x Daily     DULoxetine  60 mg Oral Daily     gabapentin  300 mg Oral BID     gabapentin  600 mg Oral At Bedtime     hydrALAZINE  25 mg Oral Q8H PERLA     lactobacillus rhamnosus (GG)  1 capsule Oral Daily     levothyroxine  100 mcg Oral Daily     lidocaine   Topical 4x Daily     losartan  50 mg Oral BID     nystatin   Topical BID     polyethylene glycol  17 g Oral Daily     polyethylene glycol-propylene glycol PF  2 drop Both Eyes TID     QUEtiapine  25 mg Oral At Bedtime     senna-docusate  1 tablet Oral Daily     simethicone  125 mg Oral BID     vitamin D3  50 mcg Oral Daily       Data reviewed today: I personally reviewed all new medications, labs, imaging/diagnostics reports over the past 24 hours. Pertinent findings  include:    Imaging:   No results found for this or any previous visit (from the past 24 hour(s)).    Labs:  Most Recent 3 CBC's:  Recent Labs   Lab Test 05/16/22  1339 02/27/22  1316 01/29/21  1049 01/29/21  0000 01/04/21  0627   WBC 4.6 5.4  --   --  5.4   HGB 12.8 12.7 12.3   < > 10.5*   MCV 98 98  --   --  101*    210  --   --  316    < > = values in this interval not displayed.     Most Recent 3 BMP's:  Recent Labs   Lab Test 05/16/22  1339 02/27/22  1316 01/29/21  1049    141 141   POTASSIUM 4.1 4.1 4.0   CHLORIDE 105 104 103   CO2 29 27 33*   BUN 18 14 20   CR 0.69 0.61 0.62   ANIONGAP 9 10 5   YADIRA 9.2 8.9 9.1    98 96     Most Recent 2 LFT's:  Recent Labs   Lab Test 02/27/22  1316 01/04/21  0627   AST 11 14   ALT <9 <9   ALKPHOS 57 124*   BILITOTAL 0.5 0.3     Most Recent 3 INR's:  Recent Labs   Lab Test 12/24/20  0127   INR 1.04

## 2022-05-20 NOTE — PROGRESS NOTES
[facility] ACUTE PAIN SERVICE    Daily PAIN Progress Note    Assessment/Plan:  Mehreen Camara is a 82 year old female who was admitted on 5/16/2022.  Pain team was asked to see the patient for acute fracture pain on chronic low back pain, patient on Belbuca and oxycodone chronically. Admitted for left-sided hip pain status post mechanical fall at her assisted living facility. History of Lewy body dementia, multiple system atrophy, falls, traumatic injuries due to falls.  The patient does not smoke and denies chemical dependency history.      Allergies: Aspirin, bupropion, codeine, ibuprofen     Opioid Induced Respiratory Depression Risk Assessment:?High  Home MME:  Belbuca 150 mcg bid in addition to oxycodone 5 mg qid prn. Belbuca recently increased from 75 mcg bid 1-2 weeks ago. See  below.  ?     In 24 hours patient has used 10 mg oxycodone.     Concern for drug interactions and side effects with Zyprexa,Seroquel, Belbuca and oxycodone. Patient sedated most of the day, did wake briefly for me this AM but didn't wake to eat and fully be awake until 3pm  Discussed with Dr. Montero. Stop Zyprexa, lower seroquel. Decrease gabapentin, decrease oxycodone and only use if no sedation/confusion above her baseline. Likely additive sedative effects occurred with all these drugs last onry. Did recheck Qtc, it is ok at 411, lower than 4 days ago.    Parameters in order for all sedating and cognitive meds: if not sure if patient is confused / sedated (due to her baseline dementia) defer to the side of caution and do not administer med. (for all opioids, Seroquel, gabapentin). Did discuss this with evening primary nurse just now.     PLAN:   1) Pain is consistent with fracture pain, chronic pain. Labs and imaging indicated: CT thoracic spine with acute T12 fracture, Treatment plan includes multimodal pain approach, ice, PT, OT, Spine surgery consult, TLSO per ortho. Patient educated regarding Multimodal pain approach,  medications as listed below. Plans for discharge to TCU when able.   2)Multimodal Medication Therapy  Topical: Lidocaine ointment 4 times daily, Voltaren qid  NSAID'S: CrCl 67.5.  None -allergies noted to aspirin, ibuprofen  Muscle Relaxants: None  Adjuvants: APAP TID, gabapentin [phone]  Antidepressants/anxiolytics: Duloxetine 60 mg daily, carbidopa levodopa  mg 3 times daily, Seroquel 25 mg at bedtime  Opioids: Buprenorphine was decreased to 75 mcg bid due to ongoing lethargy and increasing confusion over the past week with recent increase in dose, oxycodone 5 mg q3h prn: decrease to 2.5, only use if not any confusion above home confusion, same with sedation.   IV Pain medication: None   3)Non-medication interventions: Ice, TLSO brace  Acupuncture consult - offered and declined  Integrative consult - offered and declined  4)Constipation Prophylaxis: Scheduled and prn: MiraLAX daily, senna docusate daily, as needed sup, as needed MiraLAX, as needed senna docusate, as needed Fleet enema  5) Care Teams: Hospital Medicine Service, spine, pain     -Opioid prescriber has been at [facility] pulled from system on 5/17/2022. This indicates   5/5/2022 Belbuca 150 mcg film #28  4/14/2022 Belbuca 75 mcg film #60  4/1/2022 gabapentin 300 mg #112  3/9/2022 Belbuca 75 mcg film #60  Has had fills for oxycodone 5 mg tablets in June, July, September, November 2021  Discharge Recommendations - We recommend prescribing the following at the time of discharge: Decreased Belbuca dose: rx ready in discharge, APAP, topicals if effective for acute pain. Intranasal Naloxone recommended and has script per [facility] review. Follow up Primary Care Provider, Orthopedics, Pain clinic.     Called Pain clinic to update on changes to Belbuca dose 5/19/22.     Subjective:  Patient sleeping x 2, did not wake due to reports of minimal sleep overnight and catching up now.   Did speak to patient briefly  "during 3rd attempt as she woke when I entered. Answered questions appropriately. Pain 'same'. Is 'tired'. Understands she will be discharged on new (previous) dose of Belbuca.     Active Problems:    Major depressive disorder, recurrent episode (H)    Hypertension    Fall    Lewy body dementia with behavioral disturbance (H)    Urinary tract infection without hematuria, site unspecified    Closed stable burst fracture of twelfth thoracic vertebra, initial encounter (H)      Objective:  Vital signs in last 24 hours:  BP (!) 143/80 (BP Location: Right arm)   Pulse 53   Temp 97.2  F (36.2  C) (Oral)   Resp 16   Ht 1.676 m (5' 6\")   Wt 68 kg (150 lb)   SpO2 92%   BMI 24.21 kg/m    Weight:     Vitals:    05/16/22 1246   Weight: 68 kg (150 lb)      Safia Person PHarmD  Acute Care Pain Management Program  [facility] ([facility], [place], [place])  Monday-Friday 8a-4p   Page via online paging system or call [phone]    "

## 2022-05-20 NOTE — PLAN OF CARE
Patient vital signs are at baseline: BP has been elevated at times, 177 at hs, given scheduled medications.   Patient able to ambulate as they were prior to admission or with assist devices provided by therapies during their stay:  Yes  Patient MUST void prior to discharge:  Yes  Patient able to tolerate oral intake:  Yes  Pain has adequate pain control using Oral analgesics:  Yes  Does patient have an identified :  No,  Reason:  patient is discharging to TCU/memory care for safety/therapies  Has goal D/C date and time been discussed with patient:  Yes-Patient family is involved in planning her care at discharge.     Problem: Risk for Delirium  Goal: Optimal Coping  Outcome: Ongoing, Progressing     Problem: Risk for Delirium  Goal: Improved Sleep  Outcome: Ongoing, Progressing     Problem: Functional Ability Impaired (Orthopaedic Fracture)  Goal: Optimal Functional Ability  Outcome: Ongoing, Progressing  Intervention: Optimize Functional Ability  Recent Flowsheet Documentation  Taken 5/19/2022 1944 by Sherie Betancur, RN  Activity Management: activity encouraged  Activity Assistance Provided: assistance, 1 person  Positioning/Transfer Devices:   pillows   in use     Problem: Pain (Orthopaedic Fracture)  Goal: Acceptable Pain Control  Outcome: Ongoing, Progressing     Patient has been pleasant and cooperative this evening, took hs medications well with applesauce, creams applied as ordered, purewick in place, tolerating food and fluids. Only oriented to self and family, is able to recall that she injured her back and is able to report her pain.

## 2022-05-20 NOTE — PLAN OF CARE
Patient has been somnolent this shift and will arouse to touch and say a few words and then falls right back asleep.  Therefore, pt has not gotten OOB, not ate, drink, nor took any pills. MD adjusting medications. Family here.  IV BP med given for high BP. Alarms on for safety.

## 2022-05-21 ENCOUNTER — APPOINTMENT (OUTPATIENT)
Dept: PHYSICAL THERAPY | Facility: CLINIC | Age: 82
DRG: 551 | End: 2022-05-21
Payer: COMMERCIAL

## 2022-05-21 LAB
ATRIAL RATE - MUSE: 65 BPM
BACTERIA BLD CULT: NO GROWTH
BACTERIA BLD CULT: NO GROWTH
DIASTOLIC BLOOD PRESSURE - MUSE: NORMAL MMHG
INTERPRETATION ECG - MUSE: NORMAL
P AXIS - MUSE: 68 DEGREES
PR INTERVAL - MUSE: 158 MS
QRS DURATION - MUSE: 110 MS
QT - MUSE: 396 MS
QTC - MUSE: 411 MS
R AXIS - MUSE: -7 DEGREES
SYSTOLIC BLOOD PRESSURE - MUSE: NORMAL MMHG
T AXIS - MUSE: 44 DEGREES
VENTRICULAR RATE- MUSE: 65 BPM

## 2022-05-21 PROCEDURE — 250N000013 HC RX MED GY IP 250 OP 250 PS 637: Performed by: FAMILY MEDICINE

## 2022-05-21 PROCEDURE — 258N000003 HC RX IP 258 OP 636: Performed by: INTERNAL MEDICINE

## 2022-05-21 PROCEDURE — 250N000013 HC RX MED GY IP 250 OP 250 PS 637: Performed by: NURSE PRACTITIONER

## 2022-05-21 PROCEDURE — 250N000013 HC RX MED GY IP 250 OP 250 PS 637: Performed by: INTERNAL MEDICINE

## 2022-05-21 PROCEDURE — 99232 SBSQ HOSP IP/OBS MODERATE 35: CPT | Performed by: HOSPITALIST

## 2022-05-21 PROCEDURE — 258N000003 HC RX IP 258 OP 636: Performed by: HOSPITALIST

## 2022-05-21 PROCEDURE — 250N000013 HC RX MED GY IP 250 OP 250 PS 637: Performed by: HOSPITALIST

## 2022-05-21 PROCEDURE — 120N000001 HC R&B MED SURG/OB

## 2022-05-21 PROCEDURE — 250N000011 HC RX IP 250 OP 636: Performed by: INTERNAL MEDICINE

## 2022-05-21 PROCEDURE — 97116 GAIT TRAINING THERAPY: CPT | Mod: GP

## 2022-05-21 PROCEDURE — 97530 THERAPEUTIC ACTIVITIES: CPT | Mod: GP

## 2022-05-21 RX ORDER — SODIUM CHLORIDE 9 MG/ML
INJECTION, SOLUTION INTRAVENOUS CONTINUOUS
Status: DISCONTINUED | OUTPATIENT
Start: 2022-05-21 | End: 2022-05-25

## 2022-05-21 RX ADMIN — OXYCODONE HYDROCHLORIDE 2.5 MG: 5 TABLET ORAL at 12:30

## 2022-05-21 RX ADMIN — HYDRALAZINE HYDROCHLORIDE 25 MG: 25 TABLET, FILM COATED ORAL at 14:01

## 2022-05-21 RX ADMIN — CARBIDOPA AND LEVODOPA 1 TABLET: 25; 100 TABLET ORAL at 20:17

## 2022-05-21 RX ADMIN — Medication 1 CAPSULE: at 09:00

## 2022-05-21 RX ADMIN — NYSTATIN: 100000 CREAM TOPICAL at 10:41

## 2022-05-21 RX ADMIN — DICLOFENAC 2 G: 10 GEL TOPICAL at 14:05

## 2022-05-21 RX ADMIN — SIMETHICONE 125 MG: 125 TABLET, CHEWABLE ORAL at 09:01

## 2022-05-21 RX ADMIN — DICLOFENAC 2 G: 10 GEL TOPICAL at 06:51

## 2022-05-21 RX ADMIN — NYSTATIN: 100000 CREAM TOPICAL at 20:29

## 2022-05-21 RX ADMIN — CARBIDOPA AND LEVODOPA 1 TABLET: 25; 100 TABLET ORAL at 14:01

## 2022-05-21 RX ADMIN — SENNOSIDES AND DOCUSATE SODIUM 1 TABLET: 50; 8.6 TABLET ORAL at 09:01

## 2022-05-21 RX ADMIN — DICLOFENAC 2 G: 10 GEL TOPICAL at 10:43

## 2022-05-21 RX ADMIN — ACETAMINOPHEN 1000 MG: 500 TABLET ORAL at 20:16

## 2022-05-21 RX ADMIN — LIDOCAINE: 50 OINTMENT TOPICAL at 09:10

## 2022-05-21 RX ADMIN — POLYETHYLENE GLYCOL AND PROPYLENE GLYCOL 2 DROP: 4; 3 SOLUTION/ DROPS OPHTHALMIC at 09:08

## 2022-05-21 RX ADMIN — LIDOCAINE: 50 OINTMENT TOPICAL at 12:30

## 2022-05-21 RX ADMIN — LOSARTAN POTASSIUM 50 MG: 50 TABLET, FILM COATED ORAL at 09:01

## 2022-05-21 RX ADMIN — SIMETHICONE 125 MG: 125 TABLET, CHEWABLE ORAL at 20:16

## 2022-05-21 RX ADMIN — Medication 50 MCG: at 09:00

## 2022-05-21 RX ADMIN — POLYETHYLENE GLYCOL AND PROPYLENE GLYCOL 2 DROP: 4; 3 SOLUTION/ DROPS OPHTHALMIC at 20:37

## 2022-05-21 RX ADMIN — SODIUM CHLORIDE 250 ML: 9 INJECTION, SOLUTION INTRAVENOUS at 16:40

## 2022-05-21 RX ADMIN — ACETAMINOPHEN 1000 MG: 500 TABLET ORAL at 09:01

## 2022-05-21 RX ADMIN — GABAPENTIN 300 MG: 300 CAPSULE ORAL at 22:39

## 2022-05-21 RX ADMIN — POLYETHYLENE GLYCOL AND PROPYLENE GLYCOL 2 DROP: 4; 3 SOLUTION/ DROPS OPHTHALMIC at 14:02

## 2022-05-21 RX ADMIN — CARBIDOPA AND LEVODOPA 1 TABLET: 25; 100 TABLET ORAL at 09:01

## 2022-05-21 RX ADMIN — DULOXETINE HYDROCHLORIDE 60 MG: 60 CAPSULE, DELAYED RELEASE ORAL at 09:01

## 2022-05-21 RX ADMIN — POLYETHYLENE GLYCOL 3350 17 G: 17 POWDER, FOR SOLUTION ORAL at 09:06

## 2022-05-21 RX ADMIN — LEVOTHYROXINE SODIUM 100 MCG: 0.05 TABLET ORAL at 06:34

## 2022-05-21 RX ADMIN — HYDRALAZINE HYDROCHLORIDE 25 MG: 25 TABLET, FILM COATED ORAL at 22:40

## 2022-05-21 RX ADMIN — HYDRALAZINE HYDROCHLORIDE 10 MG: 20 INJECTION INTRAMUSCULAR; INTRAVENOUS at 04:43

## 2022-05-21 RX ADMIN — HYDRALAZINE HYDROCHLORIDE 25 MG: 25 TABLET, FILM COATED ORAL at 07:06

## 2022-05-21 RX ADMIN — BUPRENORPHINE HYDROCHLORIDE 75 MCG: 75 FILM, SOLUBLE BUCCAL at 10:43

## 2022-05-21 RX ADMIN — LIDOCAINE: 50 OINTMENT TOPICAL at 16:48

## 2022-05-21 RX ADMIN — OXYCODONE HYDROCHLORIDE 2.5 MG: 5 TABLET ORAL at 02:51

## 2022-05-21 RX ADMIN — BUPRENORPHINE HYDROCHLORIDE 75 MCG: 75 FILM, SOLUBLE BUCCAL at 22:44

## 2022-05-21 RX ADMIN — GABAPENTIN 300 MG: 300 CAPSULE ORAL at 09:01

## 2022-05-21 RX ADMIN — GABAPENTIN 300 MG: 300 CAPSULE ORAL at 14:01

## 2022-05-21 RX ADMIN — OXYCODONE HYDROCHLORIDE 2.5 MG: 5 TABLET ORAL at 20:25

## 2022-05-21 RX ADMIN — SODIUM CHLORIDE: 9 INJECTION, SOLUTION INTRAVENOUS at 20:10

## 2022-05-21 RX ADMIN — LIDOCAINE: 50 OINTMENT TOPICAL at 20:29

## 2022-05-21 RX ADMIN — ACETAMINOPHEN 1000 MG: 500 TABLET ORAL at 14:01

## 2022-05-21 ASSESSMENT — ACTIVITIES OF DAILY LIVING (ADL)
ADLS_ACUITY_SCORE: 43
ADLS_ACUITY_SCORE: 43
ADLS_ACUITY_SCORE: 45
ADLS_ACUITY_SCORE: 43
ADLS_ACUITY_SCORE: 47
ADLS_ACUITY_SCORE: 43
ADLS_ACUITY_SCORE: 43
ADLS_ACUITY_SCORE: 45
ADLS_ACUITY_SCORE: 43

## 2022-05-21 NOTE — PLAN OF CARE
Problem: Plan of Care - These are the overarching goals to be used throughout the patient stay.    Goal: Optimal Comfort and Wellbeing  Outcome: Ongoing, Progressing     Problem: Risk for Delirium  Goal: Improved Attention and Thought Clarity  Outcome: Ongoing, Progressing     Pt is A&Ox3. Pt has been sleepy but woke up a few time during the shift. Pt complains of generalized pain. Scheduled tylenol given. Pt turned q2.

## 2022-05-21 NOTE — PROVIDER NOTIFICATION
Rm 348. Pt BP is 81/48. Please advise.     New order obtained. 250 bolus NS in 30 mnts. Recheck in 1 hour BP.

## 2022-05-21 NOTE — PLAN OF CARE
"Goal Outcome Evaluation:    Plan of Care Reviewed With: patient     Overall Patient Progress: no change    Drowsy at the beginning of the shift, then became more alert and oriented x 4, able to make her needs known, c/o generalized pain rating 10 out of 10, gave PRN 2.5 mg of Oxy with minimal relief, repositioned Q2 hours, gave PRN IV hydralazine for a manual BP of 202/98, rechecked and was 132/70, when giving AM medications patient became tearful and stated \"I'm in so much pain theres no point of living\" and \"what's the point of taking Synthroid, I'm going to heaven\" provided emotional support, TSLO off while in bed.   "

## 2022-05-21 NOTE — PLAN OF CARE
Problem: Plan of Care - These are the overarching goals to be used throughout the patient stay.    Goal: Optimal Comfort and Wellbeing  Intervention: Monitor Pain and Promote Comfort  Flowsheets (Taken 5/21/2022 1500)  Pain Management Interventions:   medication (see MAR)   rest   repositioned   emotional support   distraction     Problem: Risk for Delirium  Goal: Improved Attention and Thought Clarity  Outcome: Ongoing, Not Progressing  Intervention: Maximize Cognitive Function  Flowsheets (Taken 5/21/2022 1500)  Reorientation Measures:   glasses use encouraged   reorientation provided   familiar social contact encouraged   Goal Outcome Evaluation:    Plan of Care Reviewed With: patient     Overall Patient Progress: no change    JIMMIE BUSTOS RN

## 2022-05-21 NOTE — PROGRESS NOTES
[facility] ACUTE PAIN SERVICE    Daily PAIN Progress Note    Assessment/Plan:  Mehreen Camara is a 82 year old female who was admitted on 5/16/2022.  Pain team was asked to see the patient for acute fracture pain on chronic low back pain, patient on Belbuca and oxycodone chronically. Admitted for left-sided hip pain status post mechanical fall at her assisted living facility. History of Lewy body dementia, multiple system atrophy, falls, traumatic injuries due to falls.  The patient does not smoke and denies chemical dependency history.      Allergies: Aspirin, bupropion, codeine, ibuprofen     Opioid Induced Respiratory Depression Risk Assessment:?High  Home MME:  Belbuca 150 mcg bid in addition to oxycodone 5 mg qid prn. Belbuca recently increased from 75 mcg bid 1-2 weeks ago.    In 24 hours patient has used 2.5 mg oxycodone.   Appears family is interested in learning more about palliative care, hospice options. Continue to hold opioids/sedating meds if confused (more than baseline) or sedated, utilized as needed when not confused/sedated. Monitor for s/s of opioid withdrawal and please document if any: none noted now.     Per hospitalist this AM: Transitioning to possible hospice discharge; consultation to hospice team ordered and pending.     Would maintain same pain plan until hospice consult. OK to give Belbuca this AM as long as parameters met (in order). Same with oxycodone.   Did discuss with primary overnight RN this morning, no s/s of opioid withdrawal.    PLAN:   1) Pain is consistent with fracture pain, chronic pain. Labs and imaging indicated: CT thoracic spine with acute T12 fracture, Treatment plan includes multimodal pain approach, ice, PT, OT, Spine surgery consult, TLSO per ortho. Patient educated regarding Multimodal pain approach, medications as listed below. Plans for discharge to TCU when able.   2)Multimodal Medication Therapy  Topical: Lidocaine ointment 4 times daily, Voltaren  qid  NSAID'S: CrCl 67.5.  None -allergies noted to aspirin, ibuprofen  Muscle Relaxants: None  Adjuvants: APAP TID, gabapentin [phone]  Antidepressants/anxiolytics: Duloxetine 60 mg daily, carbidopa levodopa  mg 3 times daily, Seroquel 25 mg at bedtime  Opioids: Buprenorphine was decreased to 75 mcg bid due to ongoing lethargy and increasing confusion over the past week with recent increase in dose, oxycodone 5 mg q3h prn: decrease to 2.5, only use if not any confusion above home confusion, same with sedation.   IV Pain medication: None   3)Non-medication interventions: Ice, TLSO brace  Acupuncture consult - offered and declined  Integrative consult - offered and declined  4)Constipation Prophylaxis: Scheduled and prn: MiraLAX daily, senna docusate daily, as needed sup, as needed MiraLAX, as needed senna docusate, as needed Fleet enema  5) Care Teams: Hospital Medicine Service, spine, pain     -Opioid prescriber has been at [facility] pulled from system on 5/17/2022. This indicates   5/5/2022 Belbuca 150 mcg film #28  4/14/2022 Belbuca 75 mcg film #60  4/1/2022 gabapentin 300 mg #112  3/9/2022 Belbuca 75 mcg film #60  Has had fills for oxycodone 5 mg tablets in June, July, September, November 2021  Discharge Recommendations - We recommend prescribing the following at the time of discharge: Decreased Belbuca dose: rx ready in discharge, APAP, topicals if effective for acute pain. Intranasal Naloxone recommended and has script per [facility] review. Follow up Primary Care Provider, Orthopedics, Pain clinic.     Called Pain clinic to update on changes to Belbuca dose 5/19/22.     Subjective:  Patient sleeping x 2, did not wake due to reports of minimal sleep overnight and catching up now.   Did speak to patient briefly during 3rd attempt as she woke when I entered. Answered questions appropriately. Pain 'same'. Is 'tired'. Understands she will be discharged on new  "(previous) dose of Belbuca.     Active Problems:    Major depressive disorder, recurrent episode (H)    Hypertension    Fall    Lewy body dementia with behavioral disturbance (H)    Urinary tract infection without hematuria, site unspecified    Closed stable burst fracture of twelfth thoracic vertebra, initial encounter (H)      Objective:  Vital signs in last 24 hours:  BP (!) 142/88 (BP Location: Right arm, Patient Position: Semi-Sibley's)   Pulse 69   Temp 97.6  F (36.4  C) (Oral)   Resp 17   Ht 1.676 m (5' 6\")   Wt 68 kg (150 lb)   SpO2 97%   BMI 24.21 kg/m    Weight:     Vitals:    05/16/22 1246   Weight: 68 kg (150 lb)      Safia Person, PHarmD  Acute Care Pain Management Program  [facility] ([facility], [place], [place])  Monday-Friday 8a-4p   Page via online paging system or call [phone]    "

## 2022-05-21 NOTE — PROGRESS NOTES
[facility] Medicine PROGRESS NOTE      Identification/Summary:   Mehreen Camara is a 82 year old old female who presented to the emergency department for fall at her nursing facility.  ED evaluation with T12 compression fracture.  Patient was too painful to sit up or ambulate.  Other imaging was negative for acute fracture.  She has a history of Lewy body dementia, multiple system atrophy. Having ongoing intermittent sundowning, agitation, and delirium.  Already takes Neurontin and Seroquel at bedtime, with ativan given here judiciously as needed. PT and OT initial evals are still pending as of this morning; and disposition pending several factors including PT/OT evals, and CM/SW actively following for placement. Buprenorphine dosing/prescription for discharge as per pain team. Transitioning to possible hospice discharge; consultation to palliative care team ordered  hospice ordered and pending.      Assessment and Plan:  Goals of care discussion  Dr. Montero had discussed with daughter over the phone and as well as personally met her this afternoon patient with a history of Parkinson's disease Lewy body dementia multisystem atrophy and autonomic dysfunction resulting in high and low blood pressures has been declining overall over the last few years  Patient was complaining of a lot of pain and also declining significantly functionally  Multiple family members had discussions and they are interested in hospice enrollment if patient will qualify for hospice     Social work consultation requested for hospice referral  Discussed with charge nurse about this request  Consultation to palliative care team ordered  hospice ordered and pending.     Unwitnessed fall with T12 compression fracture  She is opiate tolerant.  Scheduled Tylenol, higher dose oxycodone as needed, PT and OT evaluations  Spine consult ordered in ED to give further recommendations  IV Dilaudid as needed for breakthrough pain  Monitor  neuro status of lower extremities  Noted acute pain team already consulted on 5/17 and following, much appreciated.   As per pain medicine  Multimodal Medication Therapy  Topical: Lidocaine ointment 4 times daily, Voltaren qid  NSAID'S: CrCl 67.5.  None -allergies noted to aspirin, ibuprofen  Muscle Relaxants: None  Adjuvants: APAP TID, gabapentin [phone]  Antidepressants/anxiolytics: Duloxetine 60 mg daily, carbidopa levodopa  mg 3 times daily, Seroquel 25 mg at bedtime  Opioids: Buprenorphine was decreased to 75 mcg bid due to ongoing lethargy and increasing confusion over the past week with recent increase in dose, oxycodone 5 mg q3h prn  IV Pain medication: None   Non-medication interventions: Ice, TLSO brace  Acupuncture consult - offered and declined  Constipation Prophylaxis: Scheduled and prn: MiraLAX daily, senna docusate daily, as needed sup, as needed MiraLAX, as needed senna docusate, as needed Fleet enema    TLSO brace ordered by orthopedic surgery  Patient is too somnolent this morning and was unable to wake up and was unable to take oral pills  Most likely secondary to polypharmacy  Buprenorphine, Zyprexa, oxycodone, gabapentin, Seroquel contributing to it    Requested nurse to call pain medicine team to come by and adjust her medications and some of these medications    Stopped PRN zyprexa   REduced dose of gabapentin at bedtime to 300mg from 600mg   Reduced dose of seroquel from 25mg to 12.5mg PO at bedtime PRN for agitation   Reduced dose of oxycodone from 5mg to 2.5mg q 6hours PRN for pain   These scripts need to be adjusted when pt is reday for discharge     Blood pressure is also high as patient was unable to take her blood pressure pills today p.o. IV as needed hydralazine, IV vasotec ordered      Lewy body dementia  Parkinson's disease? Multiple system atrophy  Chronic pain with opiate tolerance  Acute metabolic encephalopathy, delirium  As an outpatient she takes buprenorphine  twice daily, Sinemet, duloxetine, gabapentin, oxycodone, Seroquel.  Continue her usual outpatient medications but increase the dose of oxycodone due to acute pain  Consult pain team much appreciated, as above.   Increased sinemet to tid, pt and dtr notice improvement with higher dose, recently decreased  Hospice transition as noted above.      Essential hypertension  Blood pressure quite elevated initially, improved now  As an outpatient she takes losartan, hydralazine, will continue these  Patient with significantly elevated blood pressure needing IV hydralazine overnight  Increase PTA hydralazine from 5mg twice daily to 25 mg p.o. 3 times daily today     Chronic right shoulder pain     Abnormal urinalysis  Difficult to assess for symptoms, white count only slightly elevated and urine  She does report some increased urinary frequency  Continue ciprofloxacin as started in emergency department, follow urine culture    Systolic murmur  Mild aortic stenosis in 2018  Can have this further evaluated as an outpatient, no recent echo in our system     DVT proph: Mechanical  Code Status: DNR as listed by several previous hospital stays    Diet: Regular  Pain tx: Tylenol, oxycodone, buprenorphine  PT/OT: Both ordered    Discharge:   Consult for hospice and palliative care referral sent.  Most likely discharge after enrollment with hospice in the next day or so      Landon Rhodes MD  Internal Medicine  Hospitalist  [facility]  Phone: #[phone]    Interval History/Subjective:  No acute events overnight.    She is pleasant and confirms her wishes to learn more about hospice. No chest pain, no shortness of breath. No other new complaints.  Discussed plan of care with patient. Answered all questions to patient's verbalized and stated understanding and satisfaction.    Physical Exam/Objective:  GENERAL:  Alert, appears comfortable, in no acute distress, appears stated age, sitting in chair   HEAD:   Normocephalic, without obvious abnormality, atraumatic   EYES:  Conjunctiva/corneas clear, no scleral icterus, EOM's appears intact grossly   NOSE: Nares normal, septum midline, mucosa normal, no drainage   THROAT: Lips, mucosa, and tongue appear normal   NECK: Symmetrical, trachea appears midline   BACK:   Symmetric, no curvature noted   LUNGS:   Symmetric chest rise on inhalation, respirations unlabored   CHEST WALL:  No apparent deformity   HEART:  No thrills or heaves visualized   ABDOMEN:   No masses or organomegaly apparent; non-scaphoid   EXTREMITIES: Extremities appear normal, atraumatic, no cyanosis   SKIN: No exanthems in the visualized areas   NEURO: Alert, awake, moves all four extremities freely and spontaneously while upright in chair   PSYCH: Cooperative, behavior is appropriate     Medications:   Personally Reviewed.  Medications       acetaminophen  1,000 mg Oral TID     Buprenorphine HCl  75 mcg Buccal Q12H PERLA     carbidopa-levodopa  1 tablet Oral TID     diclofenac  2 g Topical 4x Daily     DULoxetine  60 mg Oral Daily     gabapentin  300 mg Oral At Bedtime     gabapentin  300 mg Oral BID     hydrALAZINE  25 mg Oral Q8H PERLA     lactobacillus rhamnosus (GG)  1 capsule Oral Daily     levothyroxine  100 mcg Oral Daily     lidocaine   Topical 4x Daily     losartan  50 mg Oral BID     nystatin   Topical BID     polyethylene glycol  17 g Oral Daily     polyethylene glycol-propylene glycol PF  2 drop Both Eyes TID     senna-docusate  1 tablet Oral Daily     simethicone  125 mg Oral BID     vitamin D3  50 mcg Oral Daily       Data reviewed today: I personally reviewed all new medications, labs, imaging/diagnostics reports over the past 24 hours. Pertinent findings include:    Imaging:   No results found for this or any previous visit (from the past 24 hour(s)).    Labs:  Most Recent 3 CBC's:  Recent Labs   Lab Test 05/20/22  1429 05/16/22  1339 02/27/22  1316   WBC 4.7 4.6 5.4   HGB 13.7 12.8 12.7   MCV  101* 98 98    205 210     Most Recent 3 BMP's:  Recent Labs   Lab Test 05/20/22  1429 05/16/22  1339 02/27/22  1316   * 143 141   POTASSIUM 4.0 4.1 4.1   CHLORIDE 106 105 104   CO2 31 29 27   BUN 15 18 14   CR 0.69 0.69 0.61   ANIONGAP 10 9 10   YADIRA 9.3 9.2 8.9   GLC 87 124 98     Most Recent 2 LFT's:  Recent Labs   Lab Test 02/27/22  1316 01/04/21  0627   AST 11 14   ALT <9 <9   ALKPHOS 57 124*   BILITOTAL 0.5 0.3     Most Recent 3 INR's:  Recent Labs   Lab Test 12/24/20  0127   INR 1.04

## 2022-05-22 LAB
ALBUMIN SERPL-MCNC: 3.2 G/DL (ref 3.5–5)
ALP SERPL-CCNC: 93 U/L (ref 45–120)
ALT SERPL W P-5'-P-CCNC: <9 U/L (ref 0–45)
ANION GAP SERPL CALCULATED.3IONS-SCNC: 8 MMOL/L (ref 5–18)
AST SERPL W P-5'-P-CCNC: 14 U/L (ref 0–40)
BASOPHILS # BLD AUTO: 0 10E3/UL (ref 0–0.2)
BASOPHILS NFR BLD AUTO: 1 %
BILIRUB SERPL-MCNC: 0.6 MG/DL (ref 0–1)
BUN SERPL-MCNC: 20 MG/DL (ref 8–28)
CALCIUM SERPL-MCNC: 8.8 MG/DL (ref 8.5–10.5)
CHLORIDE BLD-SCNC: 110 MMOL/L (ref 98–107)
CO2 SERPL-SCNC: 26 MMOL/L (ref 22–31)
CORTIS SERPL-MCNC: 16.9 UG/DL
CREAT SERPL-MCNC: 0.7 MG/DL (ref 0.6–1.1)
EOSINOPHIL # BLD AUTO: 0.1 10E3/UL (ref 0–0.7)
EOSINOPHIL NFR BLD AUTO: 3 %
ERYTHROCYTE [DISTWIDTH] IN BLOOD BY AUTOMATED COUNT: 12.1 % (ref 10–15)
GFR SERPL CREATININE-BSD FRML MDRD: 86 ML/MIN/1.73M2
GLUCOSE BLD-MCNC: 88 MG/DL (ref 70–125)
HCT VFR BLD AUTO: 41.5 % (ref 35–47)
HGB BLD-MCNC: 12.8 G/DL (ref 11.7–15.7)
IMM GRANULOCYTES # BLD: 0 10E3/UL
IMM GRANULOCYTES NFR BLD: 0 %
LYMPHOCYTES # BLD AUTO: 1.3 10E3/UL (ref 0.8–5.3)
LYMPHOCYTES NFR BLD AUTO: 25 %
MAGNESIUM SERPL-MCNC: 2 MG/DL (ref 1.8–2.6)
MCH RBC QN AUTO: 30.3 PG (ref 26.5–33)
MCHC RBC AUTO-ENTMCNC: 30.8 G/DL (ref 31.5–36.5)
MCV RBC AUTO: 98 FL (ref 78–100)
MONOCYTES # BLD AUTO: 0.4 10E3/UL (ref 0–1.3)
MONOCYTES NFR BLD AUTO: 7 %
NEUTROPHILS # BLD AUTO: 3.4 10E3/UL (ref 1.6–8.3)
NEUTROPHILS NFR BLD AUTO: 64 %
NRBC # BLD AUTO: 0 10E3/UL
NRBC BLD AUTO-RTO: 0 /100
PLATELET # BLD AUTO: 178 10E3/UL (ref 150–450)
POTASSIUM BLD-SCNC: 4 MMOL/L (ref 3.5–5)
PROCALCITONIN SERPL-MCNC: 0.02 NG/ML (ref 0–0.49)
PROT SERPL-MCNC: 6.7 G/DL (ref 6–8)
RBC # BLD AUTO: 4.22 10E6/UL (ref 3.8–5.2)
SODIUM SERPL-SCNC: 144 MMOL/L (ref 136–145)
TROPONIN I SERPL-MCNC: 0.01 NG/ML (ref 0–0.29)
WBC # BLD AUTO: 5.3 10E3/UL (ref 4–11)

## 2022-05-22 PROCEDURE — 82533 TOTAL CORTISOL: CPT | Performed by: INTERNAL MEDICINE

## 2022-05-22 PROCEDURE — 36415 COLL VENOUS BLD VENIPUNCTURE: CPT | Performed by: INTERNAL MEDICINE

## 2022-05-22 PROCEDURE — 250N000009 HC RX 250: Performed by: INTERNAL MEDICINE

## 2022-05-22 PROCEDURE — 250N000013 HC RX MED GY IP 250 OP 250 PS 637: Performed by: HOSPITALIST

## 2022-05-22 PROCEDURE — 120N000001 HC R&B MED SURG/OB

## 2022-05-22 PROCEDURE — 84145 PROCALCITONIN (PCT): CPT | Performed by: INTERNAL MEDICINE

## 2022-05-22 PROCEDURE — 80053 COMPREHEN METABOLIC PANEL: CPT | Performed by: INTERNAL MEDICINE

## 2022-05-22 PROCEDURE — 83735 ASSAY OF MAGNESIUM: CPT | Performed by: INTERNAL MEDICINE

## 2022-05-22 PROCEDURE — 250N000013 HC RX MED GY IP 250 OP 250 PS 637: Performed by: NURSE PRACTITIONER

## 2022-05-22 PROCEDURE — 258N000003 HC RX IP 258 OP 636: Performed by: INTERNAL MEDICINE

## 2022-05-22 PROCEDURE — 84484 ASSAY OF TROPONIN QUANT: CPT | Performed by: INTERNAL MEDICINE

## 2022-05-22 PROCEDURE — 250N000013 HC RX MED GY IP 250 OP 250 PS 637: Performed by: INTERNAL MEDICINE

## 2022-05-22 PROCEDURE — 250N000013 HC RX MED GY IP 250 OP 250 PS 637: Performed by: FAMILY MEDICINE

## 2022-05-22 PROCEDURE — 99233 SBSQ HOSP IP/OBS HIGH 50: CPT | Performed by: HOSPITALIST

## 2022-05-22 PROCEDURE — 85025 COMPLETE CBC W/AUTO DIFF WBC: CPT | Performed by: INTERNAL MEDICINE

## 2022-05-22 PROCEDURE — 250N000011 HC RX IP 250 OP 636: Performed by: INTERNAL MEDICINE

## 2022-05-22 PROCEDURE — 250N000011 HC RX IP 250 OP 636

## 2022-05-22 RX ORDER — LORAZEPAM 2 MG/ML
1 INJECTION INTRAMUSCULAR ONCE
Status: COMPLETED | OUTPATIENT
Start: 2022-05-22 | End: 2022-05-22

## 2022-05-22 RX ORDER — QUETIAPINE FUMARATE 25 MG/1
25 TABLET, FILM COATED ORAL AT BEDTIME
Status: DISCONTINUED | OUTPATIENT
Start: 2022-05-22 | End: 2022-05-24

## 2022-05-22 RX ORDER — LORAZEPAM 2 MG/ML
1 INJECTION INTRAMUSCULAR ONCE
Status: DISCONTINUED | OUTPATIENT
Start: 2022-05-22 | End: 2022-05-23 | Stop reason: CLARIF

## 2022-05-22 RX ORDER — OLANZAPINE 10 MG/2ML
5 INJECTION, POWDER, FOR SOLUTION INTRAMUSCULAR DAILY PRN
Status: DISCONTINUED | OUTPATIENT
Start: 2022-05-22 | End: 2022-05-24

## 2022-05-22 RX ORDER — LORAZEPAM 2 MG/ML
INJECTION INTRAMUSCULAR
Status: COMPLETED
Start: 2022-05-22 | End: 2022-05-22

## 2022-05-22 RX ADMIN — CARBIDOPA AND LEVODOPA 1 TABLET: 25; 100 TABLET ORAL at 14:22

## 2022-05-22 RX ADMIN — DICLOFENAC 2 G: 10 GEL TOPICAL at 19:52

## 2022-05-22 RX ADMIN — ACETAMINOPHEN 1000 MG: 500 TABLET ORAL at 14:22

## 2022-05-22 RX ADMIN — HYDRALAZINE HYDROCHLORIDE 25 MG: 25 TABLET, FILM COATED ORAL at 06:48

## 2022-05-22 RX ADMIN — CARBIDOPA AND LEVODOPA 1 TABLET: 25; 100 TABLET ORAL at 19:53

## 2022-05-22 RX ADMIN — NYSTATIN: 100000 CREAM TOPICAL at 19:54

## 2022-05-22 RX ADMIN — LIDOCAINE: 50 OINTMENT TOPICAL at 22:53

## 2022-05-22 RX ADMIN — QUETIAPINE FUMARATE 25 MG: 25 TABLET ORAL at 20:00

## 2022-05-22 RX ADMIN — DICLOFENAC 2 G: 10 GEL TOPICAL at 07:08

## 2022-05-22 RX ADMIN — LOSARTAN POTASSIUM 50 MG: 50 TABLET, FILM COATED ORAL at 19:46

## 2022-05-22 RX ADMIN — GABAPENTIN 300 MG: 300 CAPSULE ORAL at 14:22

## 2022-05-22 RX ADMIN — LORAZEPAM 1 MG: 2 INJECTION INTRAMUSCULAR at 08:50

## 2022-05-22 RX ADMIN — HYDRALAZINE HYDROCHLORIDE 10 MG: 20 INJECTION INTRAMUSCULAR; INTRAVENOUS at 12:12

## 2022-05-22 RX ADMIN — DICLOFENAC 2 G: 10 GEL TOPICAL at 14:25

## 2022-05-22 RX ADMIN — SODIUM CHLORIDE: 9 INJECTION, SOLUTION INTRAVENOUS at 22:57

## 2022-05-22 RX ADMIN — LORAZEPAM 1 MG: 2 INJECTION INTRAMUSCULAR; INTRAVENOUS at 08:50

## 2022-05-22 RX ADMIN — SODIUM CHLORIDE: 9 INJECTION, SOLUTION INTRAVENOUS at 04:08

## 2022-05-22 RX ADMIN — GABAPENTIN 300 MG: 300 CAPSULE ORAL at 22:49

## 2022-05-22 RX ADMIN — HYDRALAZINE HYDROCHLORIDE 25 MG: 25 TABLET, FILM COATED ORAL at 22:49

## 2022-05-22 RX ADMIN — ACETAMINOPHEN 1000 MG: 500 TABLET ORAL at 19:51

## 2022-05-22 RX ADMIN — SIMETHICONE 125 MG: 125 TABLET, CHEWABLE ORAL at 19:45

## 2022-05-22 RX ADMIN — OXYCODONE HYDROCHLORIDE 2.5 MG: 5 TABLET ORAL at 17:02

## 2022-05-22 RX ADMIN — POLYETHYLENE GLYCOL AND PROPYLENE GLYCOL 2 DROP: 4; 3 SOLUTION/ DROPS OPHTHALMIC at 20:01

## 2022-05-22 RX ADMIN — ENALAPRILAT 1.25 MG: 2.5 INJECTION INTRAVENOUS at 04:18

## 2022-05-22 RX ADMIN — LEVOTHYROXINE SODIUM 100 MCG: 0.05 TABLET ORAL at 06:48

## 2022-05-22 ASSESSMENT — ACTIVITIES OF DAILY LIVING (ADL)
ADLS_ACUITY_SCORE: 43
ADLS_ACUITY_SCORE: 47
ADLS_ACUITY_SCORE: 44
ADLS_ACUITY_SCORE: 43
ADLS_ACUITY_SCORE: 43

## 2022-05-22 NOTE — PROGRESS NOTES
[facility] ACUTE PAIN SERVICE    Daily PAIN Progress Note    Assessment/Plan:  Mehreen Camara is a 82 year old female who was admitted on 5/16/2022.  Pain team was asked to see the patient for acute fracture pain on chronic low back pain, patient on Belbuca and oxycodone chronically. Admitted for left-sided hip pain status post mechanical fall at her assisted living facility. History of Lewy body dementia, multiple system atrophy, falls, traumatic injuries due to falls.  The patient does not smoke and denies chemical dependency history.      Allergies: Aspirin, bupropion, codeine, ibuprofen     Opioid Induced Respiratory Depression Risk Assessment:?High  Home MME:  Belbuca 150 mcg bid in addition to oxycodone 5 mg qid prn. Belbuca recently increased from 75 mcg bid 1-2 weeks ago.    In 24 hours patient has used 5mg of oxycodone and scheduled Belbuca    PLAN:   1) Pain is consistent with fracture pain, chronic pain. Labs and imaging indicated: CT thoracic spine with acute T12 fracture, Treatment plan includes multimodal pain approach, ice, PT, OT, Spine surgery consult, TLSO per ortho. Patient educated regarding Multimodal pain approach, medications as listed below. Plans for discharge to TCU when able.   2)Multimodal Medication Therapy  Topical: Lidocaine ointment 4 times daily, Voltaren qid  NSAID'S: CrCl 67.5.  None -allergies noted to aspirin, ibuprofen  Muscle Relaxants: None  Adjuvants: APAP TID, gabapentin [phone]  Antidepressants/anxiolytics: Duloxetine 60 mg daily, carbidopa levodopa  mg 3 times daily, Seroquel 25 mg at bedtime  Opioids: Buprenorphine was decreased to 75 mcg bid due to ongoing lethargy and increasing confusion over the past week with recent increase in dose, oxycodone 5 mg q3h prn: decrease to 2.5, only use if not any confusion above home confusion, same with sedation.   IV Pain medication: None   3)Non-medication interventions: Ice, TLSO brace  Acupuncture consult -  offered and declined  Integrative consult - offered and declined  4)Constipation Prophylaxis: Scheduled and prn: MiraLAX daily, senna docusate daily, as needed sup, as needed MiraLAX, as needed senna docusate, as needed Fleet enema  5) Care Teams: Hospital Medicine Service, spine, pain     -Opioid prescriber has been at [facility]  pulled from system on 5/17/2022. This indicates   5/5/2022 Belbuca 150 mcg film #28  4/14/2022 Belbuca 75 mcg film #60  4/1/2022 gabapentin 300 mg #112  3/9/2022 Belbuca 75 mcg film #60  Has had fills for oxycodone 5 mg tablets in June, July, September, November 2021  Discharge Recommendations - We recommend prescribing the following at the time of discharge: Decreased Belbuca dose: rx ready in discharge, APAP, topicals if effective for acute pain. Intranasal Naloxone recommended and has script per Med recc review. Follow up Primary Care Provider, Orthopedics, Pain clinic.     Called Pain clinic to update on changes to Belbuca dose 5/19/22.     Subjective:  Patient sitting up in chair, quite alert though does have confusion, this appears to be baseline level. Reports TLSO brace is 'not fun', that discomfort is actually worse than pain from body, she thinks. Does try to adjust sitting position and does find relief. Does think that the oxycodone + belbuca has been helpful over the past day, we discuss that unfortunately the amount of pain meds we can utilize is dependent on cognition, sedation, and Blood pressure--all things that she has struggled with over the past 48 hours. She understands. I ask if she would like me  To try a (low dose) of an alternative opioid (Norco?) to try that, as blood pressure allows, and she says 'no just leave it for now'.    Orders updated with strict parameters.   Anticipate palliative care consult tomorrow as placed.     Safia Person, PHarmD  Acute Care Pain Management Program  [facility] ([place], [place])  Monday-Friday 8a-4p   Page via online paging system or call [phone]

## 2022-05-22 NOTE — PROGRESS NOTES
BP improved to 140s -- after interventions from [facility]  - was called for BP 70s    Plans - drop IVF to 75 ml/hr (frm 150), check labs

## 2022-05-22 NOTE — PLAN OF CARE
TLSO brace in place. BPS hypertensive PRN Vasotec given. IVF @ 50 ml/hr. Confused. A&O X2. Awaiting palliative consult MON.

## 2022-05-22 NOTE — PROGRESS NOTES
Unable to do assessment and VS this AM due to patient's agitation and patient refusing cares from staff.    JIMMIE BUSTOS RN

## 2022-05-22 NOTE — PLAN OF CARE
Goal Outcome Evaluation:    Problem: Fracture Stabilization and Management (Orthopaedic Fracture)  Goal: Fracture Stability  Outcome: Ongoing, Progressing     Problem: Functional Ability Impaired (Orthopaedic Fracture)  Goal: Optimal Functional Ability  Outcome: Ongoing, Progressing  Intervention: Optimize Functional Ability  Recent Flowsheet Documentation  Taken 5/21/2022 2249 by Peyton Snow RN  Positioning/Transfer Devices:    in use    pillows  Problem: Neurovascular Compromise (Orthopaedic Fracture)  Goal: Effective Tissue Perfusion  Outcome: Ongoing, Progressing     Problem: Pain (Orthopaedic Fracture)  Goal: Acceptable Pain Control  Outcome: Ongoing, Progressing  Intervention: Manage Acute Orthopaedic-Related Pain  Recent Flowsheet Documentation  Taken 5/21/2022 2025 by Peyton Snow RN  Pain Management Interventions:    medication (see MAR)    distraction    care clustered    repositioned    therapeutic presence     Pt is alert to self and situation. Pt BP has been hypotension. Orders obtained and med and IV fluids administered. Appetite 50% of meals. Pain controlled by oral medication and topical cream. Pt has palliatiave consult pending. Discharge plan for 5/22/2022 pending TCU placement.

## 2022-05-22 NOTE — PROGRESS NOTES
Patient became very agitated this morning. Patient was verbally aggressive towards staff. Patient stated that we poisoned her food. Patient did not want staff to touch her. Patient started picking at her TLSO brace and digging at it with a butter knife. [facility] was notified and visited with patient. Patient received 1 time dose of IV ativan per orders. She did allow staff to take the TLSO brace off. Patient remains to be a little agitated but is not aggressive to staff and is approachable at this time.    JIMMIE BUSTOS RN

## 2022-05-22 NOTE — PROGRESS NOTES
Care Management Follow Up    Length of Stay (days): 6    Expected Discharge Date: 05/23/2022     Concerns to be Addressed:     Hospice, discharge planning  Patient plan of care discussed at interdisciplinary rounds: Yes    Anticipated Discharge Disposition:  TBD     Anticipated Discharge Services:    Anticipated Discharge DME:      Patient/family educated on Medicare website which has current facility and service quality ratings:    Education Provided on the Discharge Plan:  yes  Patient/Family in Agreement with the Plan:  yes    Referrals Placed by CM/SW:  Not at this time  Private pay costs discussed: possible room and board at a hospice home facility    Additional Information:  SW spoke with pts daughter, Alysa, over the phone. SW provided check in after conversation with SW on Friday. Discussed current thoughts on plans for the pt including palliative consult and hospice. Alysa reports hopefulness to start with palliative consult to better understand pt prognosis and goals of care.     WILLIS did provide education on hospice services at Alysa's request. Discussed that if pt went to a TCU that the cost is covered by insurance but they expect pt to participate in therapies and rehab. Discussed that if pt is on hospice there are multiple options for where to live and have that service including possibly at [facility], a nursing home facility or hospice home. Discussed what is covered by hospice care. Discussed that pts MA would cover room and board at a nursing home but it would be private pay at a hospice home facility. Alysa asked about [facility] and WILLIS informed her that they focus on people that are very close to end of life but that it is a free service. She reports understanding.    Alysa reports plan to visit with pt and her sister this afternoon. She reports she will be at the hospital tomorrow after 10 or 11 and hopes to be present for the palliative care consult with the  pt. SW updated sticky note with this information and will follow for outcome and to assist further with disposition pending family decisions.      Karrie Patino, LICSW

## 2022-05-22 NOTE — PLAN OF CARE
Problem: Violence Risk or Actual  Goal: Anger and Impulse Control  Outcome: Ongoing, Not Progressing  Intervention: Minimize Safety Risk  Flowsheets (Taken 5/22/2022 1433)  Sensory Stimulation Regulation: television on  Enhanced Safety Measures: chair alarm set  Intervention: Promote Self-Control  Flowsheets (Taken 5/22/2022 1433)  Supportive Measures: active listening utilized     Problem: Hypertension Comorbidity  Goal: Blood Pressure in Desired Range  Outcome: Ongoing, Not Progressing  Intervention: Maintain Blood Pressure Management  Flowsheets (Taken 5/22/2022 1433)  Medication Review/Management: (PRN medication given) other (see comments)       Patient was willing to take a couple of her medications this afternoon. Family is in room. Patient states that she wants to stay in the chair. Patient still has some confusion but appears to be less agitated at this time.    JIMMIE BUSTOS RN

## 2022-05-22 NOTE — PROGRESS NOTES
"[facility] Medicine PROGRESS NOTE      Identification/Summary:   Mehreen Camara is a 82 year old old female who presented to the emergency department for fall at her nursing facility.  ED evaluation with T12 compression fracture.  Patient was too painful to sit up or ambulate.  Other imaging was negative for acute fracture.  She has a history of Lewy body dementia, multiple system atrophy. Having ongoing intermittent sundowning, agitation, and delirium.  Already takes Neurontin and Seroquel at bedtime, with ativan given here judiciously as needed. PT and OT initial evals are still pending as of this morning; and disposition pending several factors including PT/OT evals, and CM/SW actively following for placement. Buprenorphine dosing/prescription for discharge as per pain team. Transitioning to possible hospice discharge; consultation to palliative care team ordered  hospice ordered and pending.     Acute event - patient was extremely delirious this morning, agitated, and a danger to staff. She held her breakfast knife utensil and thought that the bedside RN, unit charge RN, and myself were \"home invaders\" and yelled for us to \"get out of my house.\" Given her Lewy body and ?Parkinsonism history, discussed with pharmacy who recommended IV ativan emergently, and then later we decided to restart her previous PTA nighttime Quetiapine which had been discontinued previously; additional PRN olanzapine IM available should it be needed for patient's or staff's safety. Mehreen then thrown the knife at the wall/ground and it was taken away from her. I asked RN/HUC directly (as I had yesterday 5/21 as well) to page out for hospice to make sure the  hospice consult was successfully received. We will aim to both keep patient comfortable while transitioning to possible hospice and prior to initial hospice evaluation/candidacy, and also will need to keep our staff members safe. 1:1 sitter ordered. No further knives " with her meals - ordered as RN order and with diet/dietary.     Assessment and Plan:  #Goals of care discussion  Dr. Montero had discussed with daughter over the phone and as well as personally met her this afternoon patient with a history of Parkinson's disease Lewy body dementia multisystem atrophy and autonomic dysfunction resulting in high and low blood pressures has been declining overall over the last few years  Patient was complaining of a lot of pain and also declining significantly functionally  Multiple family members had discussions and they are interested in hospice enrollment if patient will qualify for hospice     Social work consultation requested for hospice referral  Discussed with charge nurse about this request  Consultation to palliative care team ordered  hospice ordered and pending.   See above -  I asked RN/HUC directly (as I had yesterday 5/21 as well) to page out for hospice to make sure the  hospice consult was successfully received.    #Lewy body dementia  #Parkinson's disease? Multiple system atrophy  #Chronic pain with opiate tolerance  #Acute metabolic encephalopathy, delirium  #Agitation, Combativeness  As an outpatient she takes buprenorphine twice daily, Sinemet, duloxetine, gabapentin, oxycodone, Seroquel.  Continue her usual outpatient medications but increase the dose of oxycodone due to acute pain  Consult pain team much appreciated, as above.   Increased sinemet to tid, pt and dtr notice improvement with higher dose, recently decreased  Hospice transition as noted above.     #Unwitnessed fall with T12 compression fracture  She is opiate tolerant.  Scheduled Tylenol, higher dose oxycodone as needed, PT and OT evaluations  Spine consult ordered in ED to give further recommendations  IV Dilaudid as needed for breakthrough pain  Monitor neuro status of lower extremities  Noted acute pain team already consulted on 5/17 and following, much appreciated.   As per pain  medicine  Multimodal Medication Therapy  Topical: Lidocaine ointment 4 times daily, Voltaren qid  NSAID'S: CrCl 67.5.  None -allergies noted to aspirin, ibuprofen  Muscle Relaxants: None  Adjuvants: APAP TID, gabapentin [phone]  Antidepressants/anxiolytics: Duloxetine 60 mg daily, carbidopa levodopa  mg 3 times daily, Seroquel 25 mg at bedtime  Opioids: Buprenorphine was decreased to 75 mcg bid due to ongoing lethargy and increasing confusion over the past week with recent increase in dose, oxycodone 5 mg q3h prn  IV Pain medication: None   Non-medication interventions: Ice, TLSO brace  Acupuncture consult - offered and declined  Constipation Prophylaxis: Scheduled and prn: MiraLAX daily, senna docusate daily, as needed sup, as needed MiraLAX, as needed senna docusate, as needed Fleet enema    TLSO brace ordered by orthopedic surgery  Patient is too somnolent this morning and was unable to wake up and was unable to take oral pills  Most likely secondary to polypharmacy  Buprenorphine, Zyprexa, oxycodone, gabapentin, Seroquel contributing to it    Requested nurse to call pain medicine team to come by and adjust her medications and some of these medications    Stopped PRN zyprexa   REduced dose of gabapentin at bedtime to 300mg from 600mg   Reduced dose of seroquel from 25mg to 12.5mg PO at bedtime PRN for agitation   Reduced dose of oxycodone from 5mg to 2.5mg q 6hours PRN for pain   These scripts need to be adjusted when pt is reday for discharge     Utilize PRN as needed, available.      #Essential hypertension  Blood pressure quite elevated initially, improved now  As an outpatient she takes losartan, hydralazine, will continue these  Patient with significantly elevated blood pressure needing IV hydralazine overnight  Increase PTA hydralazine from 5mg twice daily to 25 mg p.o. 3 times daily today     #Chronic right shoulder pain     #Abnormal urinalysis  Difficult to assess for symptoms, white count  "only slightly elevated and urine  She does report some increased urinary frequency  Continue ciprofloxacin as started in emergency department, follow urine culture    #Systolic murmur  Mild aortic stenosis in 2018  Can have this further evaluated as an outpatient, no recent echo in our system     DVT proph: Mechanical  Code Status: DNR as listed by several previous hospital stays    Diet: Regular  Pain tx: Tylenol, oxycodone, buprenorphine  PT/OT: Both ordered    Discharge:   Consult for hospice and palliative care referral sent.  Most likely discharge after enrollment with hospice in the next day or so      Landon Rhodes MD  Internal Medicine  Hospitalist  [facility]  Phone: #[phone]    Interval History/Subjective:  Soft Bps overnight responded to gentle fluid bolus.     See acute event above.     In brief:   Acute event - patient was extremely delirious this morning, agitated, and a danger to staff. She held her breakfast knife utensil and thought that the bedside RN, unit charge RN, and myself were \"home invaders\" and yelled for us to \"get out of my house.\" Given her Lewy body and ?Parkinsonism history, discussed with pharmacy who recommended IV ativan emergently, and then later we decided to restart her previous PTA nighttime Quetiapine which had been discontinued previously; additional PRN olanzapine IM available should it be needed for patient's or staff's safety. Mehreen then thrown the knife at the wall/ground and it was taken away from her. I asked RN/HUC directly (as I had yesterday 5/21 as well) to page out for hospice to make sure the  hospice consult was successfully received. We will aim to both keep patient comfortable while transitioning to possible hospice and prior to initial hospice evaluation/candidacy, and also will need to keep our staff members safe. 1:1 sitter ordered. No further knives with her meals - ordered as RN order and with diet/dietary.    Physical " "Exam/Objective:  GENERAL:  Alert, appears paranoid, in emotional distress, appears stated age, sitting in chair holding knife defensively and aggressively alternating; she later threw the knife at the wall and it was taken away   HEAD:  Normocephalic, without obvious abnormality, atraumatic   EYES:  Conjunctiva/corneas clear, no scleral icterus, EOM's appears intact grossly   NOSE: Nares normal, septum midline, mucosa normal, no drainage   THROAT: Lips, mucosa, and tongue appear normal   NECK: Symmetrical, trachea appears midline   BACK:   Symmetric, no curvature noted   LUNGS:   Symmetric chest rise on inhalation, respirations unlabored   CHEST WALL:  No apparent deformity   HEART:  No thrills or heaves visualized   ABDOMEN:   No masses or organomegaly apparent; non-scaphoid   EXTREMITIES: Extremities appear normal, atraumatic, no cyanosis   SKIN: No exanthems in the visualized areas   NEURO: Alert, awake, moves all four extremities freely and spontaneously while upright in chair   PSYCH: Not cooperative, angry, upset, paranoid, inappropriate, yelling \"get out of my house\" and calling staff names including \"stupid\" and \"you people\" and \"don't be a fool\"     Medications:   Personally Reviewed.  Medications     sodium chloride 50 mL/hr at 05/22/22 0708       acetaminophen  1,000 mg Oral TID     Buprenorphine HCl  75 mcg Buccal Q12H PERLA     carbidopa-levodopa  1 tablet Oral TID     diclofenac  2 g Topical 4x Daily     DULoxetine  60 mg Oral Daily     gabapentin  300 mg Oral At Bedtime     gabapentin  300 mg Oral BID     hydrALAZINE  25 mg Oral Q8H PERLA     lactobacillus rhamnosus (GG)  1 capsule Oral Daily     levothyroxine  100 mcg Oral Daily     lidocaine   Topical 4x Daily     losartan  50 mg Oral BID     nystatin   Topical BID     polyethylene glycol  17 g Oral Daily     polyethylene glycol-propylene glycol PF  2 drop Both Eyes TID     senna-docusate  1 tablet Oral Daily     simethicone  125 mg Oral BID     vitamin " D3  50 mcg Oral Daily       Data reviewed today: I personally reviewed all new medications, labs, imaging/diagnostics reports over the past 24 hours. Pertinent findings include:    Imaging:   No results found for this or any previous visit (from the past 24 hour(s)).    Labs:  Most Recent 3 CBC's:  Recent Labs   Lab Test 05/22/22  0656 05/20/22  1429 05/16/22  1339   WBC 5.3 4.7 4.6   HGB 12.8 13.7 12.8   MCV 98 101* 98    182 205     Most Recent 3 BMP's:  Recent Labs   Lab Test 05/22/22  0656 05/20/22  1429 05/16/22  1339    147* 143   POTASSIUM 4.0 4.0 4.1   CHLORIDE 110* 106 105   CO2 26 31 29   BUN 20 15 18   CR 0.70 0.69 0.69   ANIONGAP 8 10 9   YADIRA 8.8 9.3 9.2   GLC 88 87 124     Most Recent 2 LFT's:  Recent Labs   Lab Test 05/22/22  0656 02/27/22  1316   AST 14 11   ALT <9 <9   ALKPHOS 93 57   BILITOTAL 0.6 0.5     Most Recent 3 INR's:  Recent Labs   Lab Test 12/24/20  0127   INR 1.04

## 2022-05-23 ENCOUNTER — APPOINTMENT (OUTPATIENT)
Dept: OCCUPATIONAL THERAPY | Facility: CLINIC | Age: 82
DRG: 551 | End: 2022-05-23
Payer: COMMERCIAL

## 2022-05-23 PROCEDURE — 250N000013 HC RX MED GY IP 250 OP 250 PS 637: Performed by: INTERNAL MEDICINE

## 2022-05-23 PROCEDURE — 250N000013 HC RX MED GY IP 250 OP 250 PS 637: Performed by: FAMILY MEDICINE

## 2022-05-23 PROCEDURE — 250N000013 HC RX MED GY IP 250 OP 250 PS 637: Performed by: HOSPITALIST

## 2022-05-23 PROCEDURE — 99232 SBSQ HOSP IP/OBS MODERATE 35: CPT | Performed by: HOSPITALIST

## 2022-05-23 PROCEDURE — 250N000013 HC RX MED GY IP 250 OP 250 PS 637: Performed by: NURSE PRACTITIONER

## 2022-05-23 PROCEDURE — 258N000003 HC RX IP 258 OP 636: Performed by: INTERNAL MEDICINE

## 2022-05-23 PROCEDURE — 99223 1ST HOSP IP/OBS HIGH 75: CPT | Performed by: NURSE PRACTITIONER

## 2022-05-23 PROCEDURE — 120N000001 HC R&B MED SURG/OB

## 2022-05-23 PROCEDURE — 97535 SELF CARE MNGMENT TRAINING: CPT | Mod: GO

## 2022-05-23 RX ADMIN — CARBIDOPA AND LEVODOPA 1 TABLET: 25; 100 TABLET ORAL at 09:00

## 2022-05-23 RX ADMIN — QUETIAPINE FUMARATE 25 MG: 25 TABLET ORAL at 21:57

## 2022-05-23 RX ADMIN — HYDRALAZINE HYDROCHLORIDE 25 MG: 25 TABLET, FILM COATED ORAL at 06:12

## 2022-05-23 RX ADMIN — CARBIDOPA AND LEVODOPA 1 TABLET: 25; 100 TABLET ORAL at 19:54

## 2022-05-23 RX ADMIN — LIDOCAINE: 50 OINTMENT TOPICAL at 13:49

## 2022-05-23 RX ADMIN — DICLOFENAC 2 G: 10 GEL TOPICAL at 16:11

## 2022-05-23 RX ADMIN — BUPRENORPHINE HYDROCHLORIDE 75 MCG: 75 FILM, SOLUBLE BUCCAL at 22:01

## 2022-05-23 RX ADMIN — LOSARTAN POTASSIUM 50 MG: 50 TABLET, FILM COATED ORAL at 09:00

## 2022-05-23 RX ADMIN — DICLOFENAC 2 G: 10 GEL TOPICAL at 11:53

## 2022-05-23 RX ADMIN — DULOXETINE HYDROCHLORIDE 60 MG: 60 CAPSULE, DELAYED RELEASE ORAL at 08:59

## 2022-05-23 RX ADMIN — LOSARTAN POTASSIUM 50 MG: 50 TABLET, FILM COATED ORAL at 19:54

## 2022-05-23 RX ADMIN — LEVOTHYROXINE SODIUM 100 MCG: 0.05 TABLET ORAL at 06:08

## 2022-05-23 RX ADMIN — SODIUM CHLORIDE: 9 INJECTION, SOLUTION INTRAVENOUS at 18:54

## 2022-05-23 RX ADMIN — GABAPENTIN 300 MG: 300 CAPSULE ORAL at 08:59

## 2022-05-23 RX ADMIN — POLYETHYLENE GLYCOL AND PROPYLENE GLYCOL 2 DROP: 4; 3 SOLUTION/ DROPS OPHTHALMIC at 13:49

## 2022-05-23 RX ADMIN — GABAPENTIN 300 MG: 300 CAPSULE ORAL at 13:48

## 2022-05-23 RX ADMIN — LIDOCAINE: 50 OINTMENT TOPICAL at 21:56

## 2022-05-23 RX ADMIN — NYSTATIN: 100000 CREAM TOPICAL at 19:58

## 2022-05-23 RX ADMIN — POLYETHYLENE GLYCOL AND PROPYLENE GLYCOL 2 DROP: 4; 3 SOLUTION/ DROPS OPHTHALMIC at 19:58

## 2022-05-23 RX ADMIN — LIDOCAINE: 50 OINTMENT TOPICAL at 09:13

## 2022-05-23 RX ADMIN — LIDOCAINE: 50 OINTMENT TOPICAL at 16:11

## 2022-05-23 RX ADMIN — DICLOFENAC 2 G: 10 GEL TOPICAL at 18:54

## 2022-05-23 RX ADMIN — POLYETHYLENE GLYCOL AND PROPYLENE GLYCOL 2 DROP: 4; 3 SOLUTION/ DROPS OPHTHALMIC at 09:01

## 2022-05-23 RX ADMIN — SIMETHICONE 125 MG: 125 TABLET, CHEWABLE ORAL at 09:00

## 2022-05-23 RX ADMIN — ACETAMINOPHEN 1000 MG: 500 TABLET ORAL at 19:53

## 2022-05-23 RX ADMIN — ACETAMINOPHEN 1000 MG: 500 TABLET ORAL at 08:59

## 2022-05-23 RX ADMIN — ACETAMINOPHEN 1000 MG: 500 TABLET ORAL at 13:48

## 2022-05-23 RX ADMIN — HYDRALAZINE HYDROCHLORIDE 25 MG: 25 TABLET, FILM COATED ORAL at 13:48

## 2022-05-23 RX ADMIN — SIMETHICONE 125 MG: 125 TABLET, CHEWABLE ORAL at 19:52

## 2022-05-23 RX ADMIN — SENNOSIDES AND DOCUSATE SODIUM 1 TABLET: 50; 8.6 TABLET ORAL at 09:00

## 2022-05-23 RX ADMIN — CARBIDOPA AND LEVODOPA 1 TABLET: 25; 100 TABLET ORAL at 13:48

## 2022-05-23 RX ADMIN — GABAPENTIN 300 MG: 300 CAPSULE ORAL at 21:57

## 2022-05-23 RX ADMIN — Medication 50 MCG: at 09:00

## 2022-05-23 RX ADMIN — HYDRALAZINE HYDROCHLORIDE 25 MG: 25 TABLET, FILM COATED ORAL at 21:57

## 2022-05-23 RX ADMIN — Medication 1 CAPSULE: at 09:00

## 2022-05-23 ASSESSMENT — ACTIVITIES OF DAILY LIVING (ADL)
ADLS_ACUITY_SCORE: 44
ADLS_ACUITY_SCORE: 47
ADLS_ACUITY_SCORE: 43
ADLS_ACUITY_SCORE: 47
ADLS_ACUITY_SCORE: 43
ADLS_ACUITY_SCORE: 47
ADLS_ACUITY_SCORE: 44
ADLS_ACUITY_SCORE: 47
ADLS_ACUITY_SCORE: 43

## 2022-05-23 NOTE — PROGRESS NOTES
"Orthopedic Progress Note      Assessment:  Acute burst compression fracture T12       Plan:   - Continue PT/OT as able.  - Weightbearing status: WBAT with TLSO at all times while out of bed if back pain present, as able.  - Pain team following.   - Anticoagulation: SCDs, vicente stockings and ambulation.  - Discharge planning: palliative care/hospice placement pending    Subjective:  Pain: reports that pain is well controlled while in bed  Nausea, Vomiting:  no  Lightheadedness, Dizziness:  no  Neuro:  Patient denies new onset numbness or paresthesias in bilateral lower extremities    No new ortho concerns. Palliative care/hospice met with daughters today and plan to transition to hospice at discharge.    Objective:  BP (!) 177/83 (BP Location: Left arm)   Pulse 75   Temp 98  F (36.7  C) (Oral)   Resp 16   Ht 1.676 m (5' 6\")   Wt 68 kg (150 lb)   SpO2 96%   BMI 24.21 kg/m    The patient is A&Ox3. Appears comfortable.   Sensation is intact in bilateral lower extremities  Dorsiflexion and plantar flexion is intact in bilateral lower extremities  Calves are soft and non-tender. Negative Cricket's.    Pertinent Labs   Lab Results: personally reviewed.   Lab Results   Component Value Date    INR 1.04 12/24/2020     Lab Results   Component Value Date    WBC 5.3 05/22/2022    HGB 12.8 05/22/2022    HCT 41.5 05/22/2022    MCV 98 05/22/2022     05/22/2022     Lab Results   Component Value Date     05/22/2022    CO2 26 05/22/2022     Report completed by:  Kailyn Olmedo PA-C  [facility]        "

## 2022-05-23 NOTE — PROGRESS NOTES
Care Management Follow Up    Length of Stay (days): 7    Expected Discharge Date: 05/24/2022     Concerns to be Addressed:  Hospice consult and placement        Patient plan of care discussed at interdisciplinary rounds: Yes    Anticipated Discharge Disposition:  Return to [facility]     Anticipated Discharge Services:  [facility]    Anticipated Discharge DME:  TBD      Education Provided on the Discharge Plan:  CM met with daughter (bedside) and patient     Patient/Family in Agreement with the Plan:  yes    Referrals Placed by CM/SW:  [facility]    Additional Information:  Chart reviewed. MICHELLE and HAILEE Deras, NP-C, [facility]  met with patient and daughter (bedside). Daughter is agreeable to referrals being sent with the first choice being returning to [facility] with hospice. CM updated [facility]. [facility] is agree[facility] to the patient retuning on Wednesday with [facility]. Daughter requests that transposition be arranged for discharge.       Evette Perez RN

## 2022-05-23 NOTE — PLAN OF CARE
"  Problem: Plan of Care - These are the overarching goals to be used throughout the patient stay.    Goal: Plan of Care Review/Shift Note  Description: The Plan of Care Review/Shift note should be completed every shift.  The Outcome Evaluation is a brief statement about your assessment that the patient is improving, declining, or no change.  This information will be displayed automatically on your shift note.  Outcome: Ongoing, Progressing  Goal: Patient-Specific Goal (Individualized)  Description: You can add care plan individualizations to a care plan. Examples of Individualization might be:  \"Parent requests to be called daily at 9am for status\", \"I have a hard time hearing out of my right ear\", or \"Do not touch me to wake me up as it startles me\".  Outcome: Ongoing, Progressing  Goal: Absence of Hospital-Acquired Illness or Injury  Outcome: Ongoing, Progressing  Intervention: Identify and Manage Fall Risk  Recent Flowsheet Documentation  Taken 5/23/2022 1611 by Linda Muhammad, RN  Safety Promotion/Fall Prevention:   activity supervised   assistive device/personal items within reach   bed alarm on   clutter free environment maintained   fall prevention program maintained   lighting adjusted   mobility aid in reach   nonskid shoes/slippers when out of bed   room door open   room near nurse's station  Taken 5/23/2022 0900 by Linda Muhammad, RN  Safety Promotion/Fall Prevention:   activity supervised   assistive device/personal items within reach   bed alarm on   clutter free environment maintained   fall prevention program maintained   lighting adjusted   mobility aid in reach   nonskid shoes/slippers when out of bed   room door open   room near nurse's station  Intervention: Prevent and Manage VTE (Venous Thromboembolism) Risk  Recent Flowsheet Documentation  Taken 5/23/2022 1611 by Linda Muhammad, RN  VTE Prevention/Management: SCDs (sequential compression devices) on  Taken 5/23/2022 0900 by Sabina, " Linda CARSON RN  VTE Prevention/Management: SCDs (sequential compression devices) on  Goal: Optimal Comfort and Wellbeing  Outcome: Ongoing, Progressing  Goal: Readiness for Transition of Care  Outcome: Ongoing, Progressing     Problem: Risk for Delirium  Goal: Optimal Coping  Outcome: Ongoing, Progressing  Goal: Improved Behavioral Control  Outcome: Ongoing, Progressing  Goal: Improved Attention and Thought Clarity  Outcome: Ongoing, Progressing  Goal: Improved Sleep  Outcome: Ongoing, Progressing     Problem: Violence Risk or Actual  Goal: Anger and Impulse Control  Outcome: Ongoing, Progressing  Intervention: Minimize Safety Risk  Recent Flowsheet Documentation  Taken 5/23/2022 1611 by Linda Muhammad RN  Enhanced Safety Measures: bed alarm set  Taken 5/23/2022 0900 by Linda Muhammad RN  Enhanced Safety Measures: bed alarm set     Problem: Bleeding (Orthopaedic Fracture)  Goal: Absence of Bleeding  Outcome: Ongoing, Progressing     Problem: Embolism (Orthopaedic Fracture)  Goal: Absence of Embolism Signs and Symptoms  Outcome: Ongoing, Progressing  Intervention: Prevent or Manage Embolism Risk  Recent Flowsheet Documentation  Taken 5/23/2022 1611 by Linda Muhammad RN  VTE Prevention/Management: SCDs (sequential compression devices) on  Taken 5/23/2022 0900 by Linda Muhammad RN  VTE Prevention/Management: SCDs (sequential compression devices) on     Problem: Fracture Stabilization and Management (Orthopaedic Fracture)  Goal: Fracture Stability  Outcome: Ongoing, Progressing     Problem: Functional Ability Impaired (Orthopaedic Fracture)  Goal: Optimal Functional Ability  Outcome: Ongoing, Progressing     Problem: Infection (Orthopaedic Fracture)  Goal: Absence of Infection Signs and Symptoms  Outcome: Ongoing, Progressing     Problem: Neurovascular Compromise (Orthopaedic Fracture)  Goal: Effective Tissue Perfusion  Outcome: Ongoing, Progressing     Problem: Pain (Orthopaedic Fracture)  Goal:  Acceptable Pain Control  Outcome: Ongoing, Progressing     Problem: Respiratory Compromise (Orthopaedic Fracture)  Goal: Effective Oxygenation and Ventilation  Outcome: Ongoing, Progressing  Intervention: Promote Airway Secretion Clearance  Recent Flowsheet Documentation  Taken 5/23/2022 1611 by Linda Muhammad RN  Cough And Deep Breathing: done with encouragement  Taken 5/23/2022 0900 by Linda Muhammad RN  Cough And Deep Breathing: done with encouragement     Problem: Hypertension Comorbidity  Goal: Blood Pressure in Desired Range  Outcome: Ongoing, Progressing  Intervention: Maintain Blood Pressure Management  Recent Flowsheet Documentation  Taken 5/23/2022 1611 by Linda Muhammad RN  Medication Review/Management:   medications reviewed   high-risk medications identified  Taken 5/23/2022 0900 by Linda Muhammad RN  Medication Review/Management:   medications reviewed   high-risk medications identified    Pt. Was sleeping most of the morning but then got up to the chair with OT after 1400. Pt. Is able to communicate her needs. She has a pure wick on when in bed to help with bladder incontinence management. Increased Bps have been under control with scheduled hydralazine. Back and shoulder pain has been managed with scheduled pain meds and gels. Pt. Tolerated PO intake.      Linda Muhammad RN on 5/23/2022 at 6:26 PM

## 2022-05-23 NOTE — PROGRESS NOTES
[facility] ACUTE PAIN SERVICE    Daily PAIN Chart Check Note    Assessment/Plan:  Mehreen Camara is a 82 year old female who was admitted on 5/16/2022.  Pain team was asked to see the patient for acute fracture pain on chronic low back pain, patient on Belbuca and oxycodone chronically. Admitted for left-sided hip pain status post mechanical fall at her assisted living facility. History of Lewy body dementia, multiple system atrophy, falls, traumatic injuries due to falls.  The patient does not smoke and denies chemical dependency history.      Allergies: Aspirin, bupropion, codeine, ibuprofen     Opioid Induced Respiratory Depression Risk Assessment:?High  Home MME:  Belbuca 150 mcg bid in addition to oxycodone 5 mg qid prn. Belbuca recently increased from 75 mcg bid 1-2 weeks ago.    In 24 hours patient has used 2.5mg of oxycodone, no scheduled Belbuca.    Palliative care and hospitalist notes reviewed. Would continue same pain plan until hospice takes over, at which time she will likely be taken off the Belbuca and onto full mu agonist (SL oxycodone or morphine?) PRN  for comfort. Hospice staff to determine when is the right time for this.     PLAN:   1) Pain is consistent with fracture pain, chronic pain. Labs and imaging indicated: CT thoracic spine with acute T12 fracture, Treatment plan includes multimodal pain approach, ice, PT, OT, Spine surgery consult, TLSO per ortho. Patient educated regarding Multimodal pain approach, medications as listed below. Plans for discharge to TCU when able.   2)Multimodal Medication Therapy  Topical: Lidocaine ointment 4 times daily, Voltaren qid  NSAID'S: CrCl 67.5.  None -allergies noted to aspirin, ibuprofen  Muscle Relaxants: None  Adjuvants: APAP TID, gabapentin [phone]  Antidepressants/anxiolytics: Duloxetine 60 mg daily, carbidopa levodopa  mg 3 times daily, Seroquel 25 mg at bedtime  Opioids: Buprenorphine was decreased to 75 mcg bid due to  ongoing lethargy and increasing confusion over the past week with recent increase in dose, oxycodone 5 mg q3h prn: decrease to 2.5, only use if not any confusion above home confusion, same with sedation.   IV Pain medication: None   3)Non-medication interventions: Ice, TLSO brace  Acupuncture consult - offered and declined  Integrative consult - offered and declined  4)Constipation Prophylaxis: Scheduled and prn: MiraLAX daily, senna docusate daily, as needed sup, as needed MiraLAX, as needed senna docusate, as needed Fleet enema  5) Care Teams: Hospital Medicine Service, spine, pain     -Opioid prescriber has been at [facility] pulled from system on 5/17/2022. This indicates   5/5/2022 Belbuca 150 mcg film #28  4/14/2022 Belbuca 75 mcg film #60  4/1/2022 gabapentin 300 mg #112  3/9/2022 Belbuca 75 mcg film #60  Has had fills for oxycodone 5 mg tablets in June, July, September, November 2021  Discharge Recommendations - We recommend prescribing the following at the time of discharge: Decreased Belbuca dose: rx ready in discharge, APAP, topicals if effective for acute pain. Intranasal Naloxone recommended and has script per [facility] review. Follow up Primary Care Provider, Orthopedics, Pain clinic.     Called Pain clinic to update on changes to Belbuca dose 5/19/22.     Safia Person, Any  Acute Care Pain Management Program  [facility] ([place], [place], [place])  Monday-Friday 8a-4p   Page via online paging system or call [phone]

## 2022-05-23 NOTE — PROGRESS NOTES
Nutrition Therapy    Consultation for Length of Stay. Pt goals of care are now comfort focused. Diet as tolerated. RD to sign off unless further consulted.   Thanks,

## 2022-05-23 NOTE — CONSULTS
"PALLIATIVE CARE CONSULT NOTE     Patient Name: Mehreen Camara  Date of Admission: 5/16/2022   Requesting Clinician / Team: HREB  Reason for consult: GOC  Transitioning to possible hospice discharge; consultation to palliative care team ordered SW hospice ordered and pending        Impressions and Recommendations     1)   GOALS OF CARE are (see below for full discussion).   ? Comfort focused care  ? Daughter does not want her to return to [facility] due to staff shortage.  She wants [facility]  ? Referral placed to [facility]     2)    ADVANCED CARE PLANNING  ? Patient has completed health care directive:  Y  ? Documented health care agent: Daughter Alysa  ? Code Status:DNR DNI     3)    SYMPTOM MANAGEMENT    ?   -Pain- secondary to unwitnessed fall with T12 compression fx  ? Comment As an outpatient she takes buprenorphine twice daily, Sinemet, duloxetine, gabapentin, oxycodone, Seroquel  ? Recommendations  ? -Per Pain team  ?   -Agitation  related to: pain and dementia  ? Comment: 5/22    patient was extremely delirious this morning, agitated, and a danger to staff. She held her breakfast knife utensil and thought that the bedside RN, unit charge RN, and myself were \"home invaders\" and yelled for us to \"get out of my house.\" Given her Lewy body and ?Parkinsonism history, discussed with pharmacy who recommended IV ativan emergently  ? Recommendations:    Staff decided to restart her previous PTA nighttime Quetiapine which had been discontinued previously; additional PRN olanzapine IM available should it be needed for patient's or staff's safety. Mehreen then thrown the knife at the wall/ground and it was taken away from her.    - AMS due to * Lewy Body**  o Comment:  o Recommendations:    Avoid benzodiazepines, antihistamines, anticholinergics if able.    Lights on and blinds open during the day.  Reorient frequently.    Lights & TV off during the night.  Promote normal circadian " rhythm.    Limit sensory deprivation - utilize hearing aids, glasses, etc.     Frequently assess unmet bathroom needs if applicable.         4)    PSYCHOSOCIAL/SPIRITUAL SUPPORT  ? Will request spiritual care support    ? Palliative medicine will continue to follow         Admission Info          History of Present Illness:    Mehreen Camara is a 83 yo female who resides in a LTC.   She was admitted 5/16 T12 compression Fx. She had severe back pain, struck her head but did not lose consciousness.  She says that she just tripped on something but she is not sure exactly what.    PMH chronic pain, As an outpatient she takes buprenorphine twice daily, Sinemet, duloxetine, gabapentin, oxycodone, SeroquelLewy Body dementia    Recent Inpatient Admissions (last 12 mo)    2/27 Constipation             Interval History:     Multiple family members had discussions and they are interested in hospice enrollment if patient will qualify for hospice        Palliative Assessment/discussion     I met with daughter Alysa along with RNCM I discussed the reason for Palliative Care Referral and our role in symptom management, patient family communication, understanding their choices for medical treatment, and providing  guidance in making difficult decisions in the framework of focusing on patient comfort and quality of life.    Daughter very upset with nursing care at [facility] she came with . Patient has walked to DR unattended and fell and sustained a T12 compression fx.  Now Refusing [facility].   Watched daughter attend to her mom with Lewy body dementia. She was giving her hot coffee and I strongly advised against this. Patient holding styrofoam cut firmly would not let go. I was able to put a towel under her neck to prevent burn.  Explained to daughter, due to  Her memory loss and cognitions she does not have a ability to understand.      After a long discussion, I recommended Hospice explaining how this will give her mom an  extra layer of support. I tried to explain that they will have a higher level of certified care at a nursing home (CNA, LPN, 24/7 RN as compared to an [facility] (PCA, 8 hr a day RN).     I also discussed  Hospice services, the focus of care (comfort), care delivery (short  Visits by certified  providers (Hospice team) and where services are held (persons home, LTC or [facility] hospice house   (latter two are private pay for room and board) and qualifying Dx. Goal is to provide all cares in Care center and not re hospitalize but family has final decision on this.    I explained hospice has all licensed care givers which will increase the quality of care where ever she lives.      Marisa chose LTC then other daughter arrived and they decided to [facility] to [facility] of they will take her backhe receives.   Prognosis, goals and planning        Patient's decision making preferences: daughter    I have concerns about the patient/family's health literacy today: n    Prognosis, Goals, and/or Advance Care Planning were addressed today: Y    Mood, coping, and/or meaning in the context of serious illness were addressed today: Y    [facility] Palliative Symptom data:   Pain: N  Dyspnea: N  Nausea: N  Anxiety: N    Functional Status:  Current PPS% (100% normal, 0% death): 30  Baseline PPS% (2 weeks prior to admit):50    Capacity evaluation:    Patient does not have decision making capacity based on the following:     Ability to understand relevant information about current  condition? n    Ability to demonstrate understanding of  current  illness and care needs? n    Ability to communicate  options for treatment? n      Social:   Living situation: [facility] since april 2021  Baseline function: walked  Home support: Memory care  Marital status: W  Number of children: 5 children               Review of Systems:     A full 14 point review of systems was otherwise completed and is negative aside from that mentioned  above    -----------------------------------------------------------------------------------------------------------------  I have reviewed and supplemented the documentation in this patient's medical record listed below regarding past medical history, social history, active medical problems, allergies and medications.     Current Problem List:   Active Problems:    Major depressive disorder, recurrent episode (H)    Hypertension    Fall    Lewy body dementia with behavioral disturbance (H)    Urinary tract infection without hematuria, site unspecified    Closed stable burst fracture of twelfth thoracic vertebra, initial encounter (H)      Medical/Surgical History :  Past Medical History:   Diagnosis Date     AAA (abdominal aortic aneurysm) (H)      Abdominal pain, epigastric 12/16/2013     Abnormal computed tomography scan 9/15/2016     Abnormal finding on imaging 9/15/2016     Acute encephalopathy 9/26/2015     Adjustment disorder with anxious mood 9/17/2018     Adjustment disorder with mixed anxiety and depressed mood 9/17/2018     Adrenal adenoma     stable, thought not to be hormonally active     Adrenal adenoma      Agitation      Anemia 11/18/2010     Aneurysm of abdominal vessel (H) 2/25/2008    Created by ticketscript Annotation: Jun 8 2011  8:07AM - Danni Ortiz: 4.4 on 11/2013.   Needs 6-12 month u/s or CT.  Replacement Utility updated for latest IMO load  Overview:  2/08 Abd US: 5.4 cm. 3/08 CT Abd: 2.7 x 3.3. 9/08:  MRI Abd: 2.7X3.2  Next 9/09.     Anxiety state 2/26/2008     Arthritis      Asymptomatic postmenopausal status     Created by Conversion  Replacement Utility updated for latest IMO load     Back pain 5/16/2020     Harris esophagus      Harris's esophagus 1/11/2012    Created by ticketscript Annotation: Feb  3 2012  8:32Cameron Tejada: Needs EGD 2/2017   Overview:  Overview:  Created by ticketscript Annotation: Feb  3 2012  8:32Cameron Tejada:  Needs EGD 2/2017     Benign neoplasm of ascending colon 9/19/2016     Bilateral knee pain 8/5/2020     Bloating 5/5/2017     Bradycardia 9/17/2018     Cataract 9/17/2018    Created by Conversion   Overview:  Overview:  Created by Conversion     Chest wall pain 1/20/2013     Chronic low back pain 9/29/2016     Cognitive and behavioral changes      Compression fracture of lumbar vertebra (H) 10/10/2020     Constipation      Contact dermatitis and eczema 11/22/2006     DDD (degenerative disc disease), lumbosacral 2/26/2008     Depression      Depression, major 7/13/2009     Depressive disorder      Diaphragmatic hernia 9/18/2018    Created by Conversion  Replacement Utility updated for latest IMO load  Overview:  Overview:  Created by Conversion  Replacement Utility updated for latest IMO load     Dietary counseling and surveillance 9/15/2016     Disorder of bone and cartilage 9/17/2018    Created by Conversion [facility] Annotation: Dec 13 2012  7:Roberta Padron: vit D/Ca, f/u  Dexa needed 1/2014.  Replacement Utility updated for latest IMO load  Overview:  Overview:  Created by Conversion [facility] Annotation: Dec 13 2012  7:41Roberta Kelly: vit D/Ca, f/u  Dexa needed 1/2014.  Replacement Utility updated for latest IMO load     Diverticulosis of colon 12/18/2014     Dizziness and giddiness     Created by Conversion      Dysphagia 12/17/2013     Early satiety 12/16/2013     Esophageal reflux     Created by Conversion      Essential hypertension 11/22/2006    Created by Conversion  Replacement Utility updated for latest IMO load  Overview:  Overview:  Created by Conversion  Replacement Utility updated for latest IMO load     Fall 8/5/2020     Flatulence, eructation and gas pain 2/19/2014     Gaseous abdominal distention 9/19/2016     Gastro-esophageal reflux disease with esophagitis 6/6/2017     Generalized muscle weakness 7/4/2018     GERD (gastroesophageal reflux disease)      Hemorrhage of rectum  and anus 9/19/2016     Hiatal hernia      HLD (hyperlipidemia)      Hoarseness of voice 4/6/2010     HTN (hypertension)      Hypertension      Hypokalemia 6/29/2007     Hypothyroid      Hypothyroidism 9/22/2009    Created by Conversion  Replacement Utility updated for latest IMO load  Overview:  Overview:  Created by Conversion  Replacement Utility updated for latest IMO load     Insomnia due to anxiety and fear 9/17/2018     Insomnia due to mental condition      Irritable bowel syndrome 11/22/2006     Lower back pain      Major depressive disorder, recurrent episode (H) 9/17/2018    Created by Conversion  Replacement Utility updated for latest IMO load  Overview:  Overview:  Created by Conversion  Replacement Utility updated for latest IMO load     Major depressive disorder, single episode, moderate (H)      Malaise and fatigue 7/10/2007     Metabolic encephalopathy      Migraine with aura      Mixed hyperlipidemia 2/10/2008    Created by Conversion      Mood disorder due to a general medical condition      Movement disorder 9/19/2018     Multiple system atrophy P (H)      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     Murmur      Nonrheumatic aortic valve stenosis 10/23/2020     Nontoxic multinodular goiter 11/22/2006    Overview:  thyroidectomy 7/07 for atypical cells on FNA- benign cysts seen at time of surgical pathology     Obesity, unspecified     Created by Conversion      Orthostatic hypotension dysautonomic syndrome 9/22/2017     Osteoarthritis      Partial small bowel obstruction (H)      Polyp of colon 9/19/2016     Post-op pain 12/12/2018     Postoperative hypothyroidism 1/15/2013    Overview:  7/2/07-  Thyroidectomy by Dr Wang Crenshaw     Prediabetes 10/26/2010     Primary insomnia 7/4/2017     REM sleep behavior disorder 8/5/2020     Retinal migraine 4/28/2014     Rheumatic fever     thought to have had as a child     Rupture of left Achilles tendon, sequela 12/31/2019     S/P AAA repair using bifurcation  graft 11/30/2015     S/P laparoscopic cholecystectomy 12/12/2018     Scoliosis      Sleep difficulties      Small bowel obstruction (H)      Thyroid disease      Thyroid nodule     removed nodules and thyroid     Tinnitus     Created by Conversion  Replacement Utility updated for latest IMO load     Undiagnosed cardiac murmurs 11/22/2006     Vitamin D deficiency 1/15/2013     Weak 4/29/2018     Weakness 5/4/2017     Weakness of both lower extremities 7/5/2018     Past Surgical History:   Procedure Laterality Date     AAA REPAIR  2015    wtih bifurcation graft     CARPAL TUNNEL RELEASE RT/LT Bilateral 2013     HC REVISE MEDIAN N/CARPAL TUNNEL SURG      Description: Neuroplasty Decompression Median Nerve At Carpal Tunnel;  Recorded: 01/21/2013;  Comments: bilateral     HYSTERECTOMY  2013     IR ABDOMINAL ENDOVASCULAR STENT GRAFT  11/30/2015     JOINT REPLACEMENT Right 2003     JOINT REPLACEMENT       LAPAROSCOPIC CHOLECYSTECTOMY N/A 12/12/2018    Procedure: LAPAROSCOPIC CHOLECYSTECTOMY;  Surgeon: Amadeo Sebastian MD;  Location: [facility] OR     ORTHOPEDIC SURGERY       ND THYROIDECTOMY      Description: Thyroid Surgery Total Thyroidectomy;  Recorded: 06/08/2011;     SPINE SURGERY  1981    cervical spine fusion     THYROIDECTOMY  2011     XR MAJOR JOINT OR BURSA INJ/ASP BILATERAL  5/18/2020     XR MAJOR JOINT OR BURSA INJ/ASP UNILATERAL  5/18/2020     ZC CERV SPINE FUSN,ANTER,BELOW C2      Description: Cervical Vertebral Fusion;  Recorded: 01/21/2013;  Comments: 1981     Z TOTAL ABDOM HYSTERECTOMY  1985    Description: Hysterectomy;  Recorded: 01/21/2013;     [facility] TOTAL HIP ARTHROPLASTY Right     Description: Total Hip Replacement;  Recorded: 01/21/2013;  Comments: right, 2003     Relevant Family History  Family History   Problem Relation Age of Onset     Hypertension Mother      Coronary Artery Disease Mother      Coronary Artery Disease Father      Other Cancer Brother      Parkinsonism Other      Heart Disease Mother       Heart Disease Father      Cancer Brother 57.00        colon     Hypertension Daughter      Hypertension Daughter      Neuropathy Daughter      Lupus Daughter      Heart Disease Paternal Aunt      Heart Disease Paternal Uncle      Parkinsonism Paternal Uncle      Family Status   Relation Name Status     Mo   at age 77     Fa   at age 71     Bro  (Not Specified)     OTHER uncle (Not Specified)     Bro       Esthela  Alive        lupus     Esthela  Alive        lupus     Esthela  Alive     PAunt  (Not Specified)     PUnc  (Not Specified)     Son  Alive     Esthela  Alive     Social History:    she  reports that she quit smoking about 27 years ago. Her smoking use included cigarettes. She smoked 0.50 packs per day. She has never used smokeless tobacco. She reports that she does not drink alcohol and does not use drugs.  Medication  :  Current Facility-Administered Medications   Medication     acetaminophen (TYLENOL) tablet 1,000 mg     benztropine (COGENTIN) tablet 1 mg     bisacodyl (DULCOLAX) Suppository 10 mg     Buprenorphine HCl (BELBUCA) buccal film 75 mcg     carbidopa-levodopa (SINEMET)  MG per tablet 1 tablet     diclofenac (VOLTAREN) 1 % topical gel 2 g     DULoxetine (CYMBALTA) DR capsule 60 mg     enalaprilat (VASOTEC) injection 1.25 mg     gabapentin (NEURONTIN) capsule 300 mg     gabapentin (NEURONTIN) capsule 300 mg     hydrALAZINE (APRESOLINE) injection 10 mg     hydrALAZINE (APRESOLINE) tablet 25 mg     lactobacillus rhamnosus (GG) (CULTURELL) capsule 1 capsule     levothyroxine (SYNTHROID/LEVOTHROID) tablet 100 mcg     lidocaine (XYLOCAINE) 5 % ointment     LORazepam (ATIVAN) injection 1 mg     losartan (COZAAR) tablet 50 mg     melatonin tablet 1 mg     nystatin (MYCOSTATIN) cream     OLANZapine (zyPREXA) injection 5 mg     ondansetron (ZOFRAN ODT) ODT tab 4 mg    Or     ondansetron (ZOFRAN) injection 4 mg     oxyCODONE IR (ROXICODONE) half-tab 2.5 mg     polyethylene glycol (MIRALAX)  "Packet 17 g     polyethylene glycol (MIRALAX) powder 17 g     polyethylene glycol-propylene glycol PF (SYSTANE ULTRA PF) opthalmic solution 2 drop     polyethylene glycol-propylene glycol PF (SYSTANE ULTRA PF) opthalmic solution 2 drop     QUEtiapine (SEROquel) tablet 25 mg     senna-docusate (SENOKOT-S/PERICOLACE) 8.6-50 MG per tablet 1 tablet     senna-docusate (SENOKOT-S/PERICOLACE) 8.6-50 MG per tablet 1 tablet     simethicone (MYLICON) chewable tablet 125 mg     simethicone (MYLICON) chewable tablet 125 mg     sodium chloride 0.9% infusion     sodium phosphate (FLEET ENEMA) 1 enema     Vitamin D3 (CHOLECALCIFEROL) tablet 50 mcg            Allergies:  Allergies   Allergen Reactions     Penicillins Anaphylaxis, Rash and Shortness Of Breath     Aspirin Nausea and GI Disturbance     Atenolol Cough     Atorvastatin Muscle Pain (Myalgia) and Nausea and Vomiting     Bupropion Nausea     Cephalexin Rash     Localized to neck area     Clindamycin Other (See Comments)     Constipation      Codeine Nausea     Ibuprofen Nausea and GI Disturbance     Lovastatin Muscle Pain (Myalgia)     Emergency Contact Info (Pulled from chart)  Extended Emergency Contact Information  Primary Emergency Contact: Alysa Dowd  Address: [address] [place] [address]  Home Phone: [phone]  Work Phone: [phone]  Mobile Phone: [phone]  Relation: Daughter  Secondary Emergency Contact: Irasema Rajan  Address: [address] [place] [address]  Work Phone: [phone]  Mobile Phone: [phone]  Relation: Daughter     Evaluation             PERTINENT PHYSICAL EXAMINATION:  Vital Signs: Blood pressure (!) 173/78, pulse 78, temperature 97.6  F (36.4  C), temperature source Oral, resp. rate 16, height 1.676 m (5' 6\"), weight 68 kg (150 lb), SpO2 94 %, not currently breastfeeding.   GENERAL: sitting up 40 degrees in bed*    SKIN: Warm and dry   HEENT: " Normocephalic, anicteric sclera, moist mucous membranes  LUNGS: Clear to auscultation anterolaterally; non-labored   CARDIAC: RRR, normal s1/s2, w/o m/r/g   ABDOMINAL: BS (+), soft, non distended, non tender  MUSKL: no gross joint deformities   EXTREMITIES: No edema or cyanosis, pulses 2+ and symmetrical  NEUROLOGIC: alert to self  PSYCH: NAD         Data Reviewed:     All labs/imaging reviewed in Epic     ====================================================  TT: I have personally spent a total of 112 minutes on the unit in review of medical record, consultation with the medical providers and assessment of patient today, with more than 50% of this time spent in counseling, coordination of care, and discussion with daughter and patient and RNCM  re: diagnostic results, prognosis, symptom management, risks and benefits of management options, and development of plan of care as noted above.  ====================================================    HAILEE Deras, NP-C, [facility]  Palliative Medicine  [phone]
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
## 2022-05-23 NOTE — PLAN OF CARE
"Goal Outcome Evaluation:    Plan of Care Reviewed With: patient     Overall Patient Progress: no change    Somnolent this shift, later in the shift patient was more responsive, able to verbalized \"hurry up\" and \"get out\" while providing cares, was able to take medications and follow directions and cooperative,  arousal to voice, Q 2 turns, pure wick in place, appeared comfortable    "

## 2022-05-23 NOTE — PLAN OF CARE
Goal Outcome Evaluation:    Problem: Risk for Delirium  Goal: Optimal Coping  Outcome: Ongoing, Not Progressing  Goal: Improved Attention and Thought Clarity  Outcome: Ongoing, Not Progressing     Problem: Risk for Delirium  Goal: Improved Behavioral Control  Outcome: Ongoing, Progressing  Goal: Improved Sleep  Outcome: Ongoing, Progressing      Pt is A&O to self. VSS. Pain controled by PO pain medication. Pt displayed increased agitation and increase refusal of cares. Staff was able to redirect, encouraged, and convince pt to cooperate with some cares. Needs were assess to make pt as comfortable as possible. Sleep and rest promoted. Palliative consult pending. Discharge plan for 5/23/2022 TCU/memory care.

## 2022-05-24 ENCOUNTER — APPOINTMENT (OUTPATIENT)
Dept: OCCUPATIONAL THERAPY | Facility: CLINIC | Age: 82
DRG: 551 | End: 2022-05-24
Payer: COMMERCIAL

## 2022-05-24 ENCOUNTER — APPOINTMENT (OUTPATIENT)
Dept: PHYSICAL THERAPY | Facility: CLINIC | Age: 82
DRG: 551 | End: 2022-05-24
Payer: COMMERCIAL

## 2022-05-24 LAB — SARS-COV-2 RNA RESP QL NAA+PROBE: NEGATIVE

## 2022-05-24 PROCEDURE — 99233 SBSQ HOSP IP/OBS HIGH 50: CPT | Performed by: STUDENT IN AN ORGANIZED HEALTH CARE EDUCATION/TRAINING PROGRAM

## 2022-05-24 PROCEDURE — U0005 INFEC AGEN DETEC AMPLI PROBE: HCPCS | Performed by: STUDENT IN AN ORGANIZED HEALTH CARE EDUCATION/TRAINING PROGRAM

## 2022-05-24 PROCEDURE — 97116 GAIT TRAINING THERAPY: CPT | Mod: GP

## 2022-05-24 PROCEDURE — 97530 THERAPEUTIC ACTIVITIES: CPT | Mod: GP

## 2022-05-24 PROCEDURE — 250N000013 HC RX MED GY IP 250 OP 250 PS 637: Performed by: INTERNAL MEDICINE

## 2022-05-24 PROCEDURE — 97535 SELF CARE MNGMENT TRAINING: CPT | Mod: GO

## 2022-05-24 PROCEDURE — 250N000013 HC RX MED GY IP 250 OP 250 PS 637: Performed by: HOSPITALIST

## 2022-05-24 PROCEDURE — 99232 SBSQ HOSP IP/OBS MODERATE 35: CPT | Performed by: NURSE PRACTITIONER

## 2022-05-24 PROCEDURE — 258N000003 HC RX IP 258 OP 636: Performed by: INTERNAL MEDICINE

## 2022-05-24 PROCEDURE — 120N000001 HC R&B MED SURG/OB

## 2022-05-24 PROCEDURE — 250N000013 HC RX MED GY IP 250 OP 250 PS 637: Performed by: NURSE PRACTITIONER

## 2022-05-24 PROCEDURE — 250N000013 HC RX MED GY IP 250 OP 250 PS 637: Performed by: FAMILY MEDICINE

## 2022-05-24 PROCEDURE — 250N000013 HC RX MED GY IP 250 OP 250 PS 637: Performed by: STUDENT IN AN ORGANIZED HEALTH CARE EDUCATION/TRAINING PROGRAM

## 2022-05-24 RX ADMIN — ACETAMINOPHEN 1000 MG: 500 TABLET ORAL at 09:29

## 2022-05-24 RX ADMIN — LOSARTAN POTASSIUM 50 MG: 50 TABLET, FILM COATED ORAL at 20:19

## 2022-05-24 RX ADMIN — DICLOFENAC 2 G: 10 GEL TOPICAL at 15:12

## 2022-05-24 RX ADMIN — LIDOCAINE: 50 OINTMENT TOPICAL at 09:31

## 2022-05-24 RX ADMIN — Medication 1 CAPSULE: at 09:26

## 2022-05-24 RX ADMIN — HYDRALAZINE HYDROCHLORIDE 25 MG: 25 TABLET, FILM COATED ORAL at 20:18

## 2022-05-24 RX ADMIN — NYSTATIN: 100000 CREAM TOPICAL at 09:31

## 2022-05-24 RX ADMIN — LIDOCAINE: 50 OINTMENT TOPICAL at 13:56

## 2022-05-24 RX ADMIN — BUPRENORPHINE HYDROCHLORIDE 75 MCG: 75 FILM, SOLUBLE BUCCAL at 09:25

## 2022-05-24 RX ADMIN — GABAPENTIN 300 MG: 300 CAPSULE ORAL at 20:18

## 2022-05-24 RX ADMIN — Medication 50 MCG: at 09:26

## 2022-05-24 RX ADMIN — GABAPENTIN 300 MG: 300 CAPSULE ORAL at 09:26

## 2022-05-24 RX ADMIN — LIDOCAINE: 50 OINTMENT TOPICAL at 16:35

## 2022-05-24 RX ADMIN — ACETAMINOPHEN 1000 MG: 500 TABLET ORAL at 13:56

## 2022-05-24 RX ADMIN — SIMETHICONE 125 MG: 125 TABLET, CHEWABLE ORAL at 09:30

## 2022-05-24 RX ADMIN — SENNOSIDES AND DOCUSATE SODIUM 1 TABLET: 50; 8.6 TABLET ORAL at 09:29

## 2022-05-24 RX ADMIN — HYDRALAZINE HYDROCHLORIDE 25 MG: 25 TABLET, FILM COATED ORAL at 04:57

## 2022-05-24 RX ADMIN — NYSTATIN: 100000 CREAM TOPICAL at 20:23

## 2022-05-24 RX ADMIN — LEVOTHYROXINE SODIUM 100 MCG: 0.05 TABLET ORAL at 05:06

## 2022-05-24 RX ADMIN — DICLOFENAC 2 G: 10 GEL TOPICAL at 12:29

## 2022-05-24 RX ADMIN — LIDOCAINE: 50 OINTMENT TOPICAL at 20:22

## 2022-05-24 RX ADMIN — POLYETHYLENE GLYCOL 3350 17 G: 17 POWDER, FOR SOLUTION ORAL at 12:29

## 2022-05-24 RX ADMIN — HYDRALAZINE HYDROCHLORIDE 25 MG: 25 TABLET, FILM COATED ORAL at 13:57

## 2022-05-24 RX ADMIN — SODIUM CHLORIDE: 9 INJECTION, SOLUTION INTRAVENOUS at 16:38

## 2022-05-24 RX ADMIN — CARBIDOPA AND LEVODOPA 1 TABLET: 25; 100 TABLET ORAL at 20:19

## 2022-05-24 RX ADMIN — CARBIDOPA AND LEVODOPA 1 TABLET: 25; 100 TABLET ORAL at 09:29

## 2022-05-24 RX ADMIN — CARBIDOPA AND LEVODOPA 1 TABLET: 25; 100 TABLET ORAL at 13:56

## 2022-05-24 RX ADMIN — QUETIAPINE 12.5 MG: 25 TABLET, FILM COATED ORAL at 20:19

## 2022-05-24 RX ADMIN — DICLOFENAC 2 G: 10 GEL TOPICAL at 20:22

## 2022-05-24 RX ADMIN — GABAPENTIN 300 MG: 300 CAPSULE ORAL at 13:57

## 2022-05-24 RX ADMIN — POLYETHYLENE GLYCOL AND PROPYLENE GLYCOL 2 DROP: 4; 3 SOLUTION/ DROPS OPHTHALMIC at 09:30

## 2022-05-24 RX ADMIN — ACETAMINOPHEN 1000 MG: 500 TABLET ORAL at 20:19

## 2022-05-24 RX ADMIN — BUPRENORPHINE HYDROCHLORIDE 75 MCG: 75 FILM, SOLUBLE BUCCAL at 21:13

## 2022-05-24 RX ADMIN — DULOXETINE HYDROCHLORIDE 60 MG: 60 CAPSULE, DELAYED RELEASE ORAL at 09:26

## 2022-05-24 RX ADMIN — POLYETHYLENE GLYCOL AND PROPYLENE GLYCOL 2 DROP: 4; 3 SOLUTION/ DROPS OPHTHALMIC at 20:23

## 2022-05-24 RX ADMIN — LOSARTAN POTASSIUM 50 MG: 50 TABLET, FILM COATED ORAL at 09:25

## 2022-05-24 RX ADMIN — SIMETHICONE 125 MG: 125 TABLET, CHEWABLE ORAL at 20:19

## 2022-05-24 ASSESSMENT — ACTIVITIES OF DAILY LIVING (ADL)
ADLS_ACUITY_SCORE: 43
ADLS_ACUITY_SCORE: 41
ADLS_ACUITY_SCORE: 43
ADLS_ACUITY_SCORE: 43

## 2022-05-24 NOTE — PROGRESS NOTES
Care Management Follow Up    Length of Stay (days): 8    Expected Discharge Date: 05/25/2022     Concerns to be Addressed: return to assisted living with [facility]    Patient plan of care discussed at interdisciplinary rounds: Yes    Anticipated Discharge Disposition: [facility]      Anticipated Discharge Services:  [facility]    Anticipated Discharge DME:  (TBD)    Education Provided on the Discharge Plan:  AVS per bedside RN.     Patient/Family in Agreement with the Plan: yes    Referrals Placed by CM/SW:  [facility]        Additional Information:  Chart reviewed. CM sent referral to [facility]. [facility] can accept and plans to admit patient on 5/25/2022 at the assisted living. CM called [facility] to update regarding discharge time. CM spoke with daughter Alysa who is aware of the discharge plan. CM will continue to follow.     Evette Perez RN

## 2022-05-24 NOTE — PROGRESS NOTES
[facility] Medicine PROGRESS NOTE      Identification/Summary:   Mehreen Camara is a 82 year old old female who presented to the emergency department for fall at her nursing facility.  ED evaluation with T12 compression fracture.  Patient was too painful to sit up or ambulate.  Other imaging was negative for acute fracture.  She has a history of Lewy body dementia, multiple system atrophy. Having ongoing intermittent sundowning, agitation, and delirium. Buprenorphine dosing/prescription for discharge as per pain team. Original plan for discharge to TCU was cancelled due to transitioning to hospice discharge; consultation to palliative care team ordered SW hospice ordered and pending after Dr. Montero's discussion with her daughter on 5/20.       Assessment and Plan:  #Goals of care discussion  Dr. Montero had discussed with daughter over the phone and as well as personally met her this afternoon patient with a history of Parkinson's disease Lewy body dementia multisystem atrophy and autonomic dysfunction resulting in high and low blood pressures has been declining overall over the last few years  Patient was complaining of a lot of pain and also declining significantly functionally  Multiple family members had discussions and they are interested in hospice enrollment if patient will qualify for hospice     Social work consultation requested for hospice referral  Seen by palliative care team, referral placed to hospice  [facility] to accept patient and admit 05/25/2022    #Lewy body dementia  #Parkinson's disease? Multiple system atrophy  #Chronic pain with opiate tolerance  #Acute metabolic encephalopathy, delirium  #Agitation, Combativeness  As an outpatient she takes buprenorphine twice daily, Sinemet, duloxetine, gabapentin, oxycodone, Seroquel.  Pain team following and managing pain meds as below  Increased sinemet to tid, pt and dtr notice improvement with higher dose, recently decreased.    Hospice transition as noted above.   Remains on 1:1 and unfortunately still requires this.   Due to encephalopathy/somnolence, the following medications were changed during this hospital stay.    -Stopped PRN zyprexa    -Reduced dose of gabapentin at bedtime to 300mg from 600mg    -Reduced dose of seroquel from 25mg to 12.5mg PO at bedtime PRN for agitation    -Reduced dose of oxycodone from 5mg to 2.5mg q 6hours PRN for pain   These scripts need to be adjusted when pt is reday for discharge. There may be further changes if hospice is elected.     #Unwitnessed fall with T12 compression fracture  She is opiate tolerant.  Scheduled Tylenol, higher dose oxycodone as needed, PT and OT evaluations  Spine consult ordered in ED to give further recommendations  IV Dilaudid as needed for breakthrough pain  Monitor neuro status of lower extremities  Noted acute pain team already consulted on 5/17 and following, much appreciated.   As per pain medicine  Multimodal Medication Therapy  Topical: Lidocaine ointment 4 times daily, Voltaren qid  NSAID'S: CrCl 67.5.  None -allergies noted to aspirin, ibuprofen  Muscle Relaxants: None  Adjuvants: APAP TID, gabapentin [phone]  Antidepressants/anxiolytics: Duloxetine 60 mg daily, carbidopa levodopa  mg 3 times daily, Seroquel 25 mg at bedtime  Opioids: Buprenorphine was decreased to 75 mcg bid due to ongoing lethargy and increasing confusion over the past week with recent increase in dose, oxycodone 5 mg q3h prn  IV Pain medication: None   Non-medication interventions: Ice, TLSO brace  Acupuncture consult - offered and declined  Constipation Prophylaxis: Scheduled and prn: MiraLAX daily, senna docusate daily, as needed sup, as needed MiraLAX, as needed senna docusate, as needed Fleet enema  TLSO brace ordered by orthopedic surgery    # Essential hypertension  Blood pressure quite elevated initially, improved now  As an outpatient she takes losartan, hydralazine, will  continue these  Patient with significantly elevated blood pressure needing IV hydralazine overnight  Increase PTA hydralazine from 5mg twice daily to 25 mg p.o. 3 times daily today     #Chronic right shoulder pain  - Pain control as above     #Abnormal urinalysis  Difficult to assess for symptoms, white count only slightly elevated and urine  She does report some increased urinary frequency  Continue ciprofloxacin as started in emergency department, follow urine culture    #Systolic murmur  Mild aortic stenosis in 2018  Can have this further evaluated as an outpatient, no recent echo in our system     DVT proph: Mechanical  Code Status: DNR as listed by several previous hospital stays    Diet: Regular  Pain tx: Tylenol, oxycodone, buprenorphine  PT/OT: Both ordered    Discharge: Planning discharge tomorrow to assisted living with hospice    Aysha Horn MD  Internal Medicine  Hospitalist  [facility]  Pager [phone]    Interval History/Subjective:  No acute events overnight.  Patient new to me.  Seen at the bedside, with son present.  Found a seated up in the chair with TLSO brace on.  Appears more alert and conversant  Endorses chronic pain in her right shoulder elbow and entire right arm, with mild back pain.  States that pain is chronic, intermittent.  Did not think is changed from baseline.    Physical Exam/Objective:  GENERAL:   Seated up in bedside chair, appears comfortable, alert   HEAD:  Normocephalic, without obvious abnormality, atraumatic   EYES:  Conjunctiva/corneas clear, no scleral icterus, EOM's appears intact grossly   NOSE: Nares normal, septum midline, mucosa normal, no drainage   THROAT: Lips, mucosa, and tongue appear normal   NECK: Symmetrical, trachea appears midline   BACK:   Symmetric, no curvature noted   LUNGS:   Symmetric chest rise on inhalation, respirations unlabored   CHEST WALL:  No apparent deformity   HEART:  No thrills or heaves visualized    ABDOMEN:   No masses or organomegaly apparent; non-scaphoid   EXTREMITIES: Extremities appear normal, atraumatic, no cyanosis   SKIN: No exanthems in the visualized areas   NEURO: Sleeping comfortably but easily awoken, generally moves all four extremities freely   PSYCH: Deferred; reported still delirious and does not know where she is     Medications:   Personally Reviewed.  Medications     sodium chloride 50 mL/hr at 05/23/22 2038       acetaminophen  1,000 mg Oral TID     Buprenorphine HCl  75 mcg Buccal Q12H PERLA     carbidopa-levodopa  1 tablet Oral TID     diclofenac  2 g Topical 4x Daily     DULoxetine  60 mg Oral Daily     gabapentin  300 mg Oral At Bedtime     gabapentin  300 mg Oral BID     hydrALAZINE  25 mg Oral Q8H PERLA     lactobacillus rhamnosus (GG)  1 capsule Oral Daily     levothyroxine  100 mcg Oral Daily     lidocaine   Topical 4x Daily     losartan  50 mg Oral BID     nystatin   Topical BID     polyethylene glycol  17 g Oral Daily     polyethylene glycol-propylene glycol PF  2 drop Both Eyes TID     QUEtiapine  25 mg Oral At Bedtime     senna-docusate  1 tablet Oral Daily     simethicone  125 mg Oral BID     vitamin D3  50 mcg Oral Daily       Data reviewed today: I personally reviewed all new medications, labs, imaging/diagnostics reports over the past 24 hours. Pertinent findings include:    Imaging:   No results found for this or any previous visit (from the past 24 hour(s)).    Labs:  Most Recent 3 CBC's:  Recent Labs   Lab Test 05/22/22  0656 05/20/22  1429 05/16/22  1339   WBC 5.3 4.7 4.6   HGB 12.8 13.7 12.8   MCV 98 101* 98    182 205     Most Recent 3 BMP's:  Recent Labs   Lab Test 05/22/22  0656 05/20/22  1429 05/16/22  1339    147* 143   POTASSIUM 4.0 4.0 4.1   CHLORIDE 110* 106 105   CO2 26 31 29   BUN 20 15 18   CR 0.70 0.69 0.69   ANIONGAP 8 10 9   YADIRA 8.8 9.3 9.2   GLC 88 87 124     Most Recent 2 LFT's:  Recent Labs   Lab Test 05/22/22  0656 02/27/22  1316   AST 14  11   ALT <9 <9   ALKPHOS 93 57   BILITOTAL 0.6 0.5     Most Recent 3 INR's:  Recent Labs   Lab Test 12/24/20  0127   INR 1.04

## 2022-05-24 NOTE — CONSULTS
[facility] ACUTE PAIN SERVICE    Daily PAIN Progress Note    Assessment/Plan:  Mehreen Camara is a 82 year old female who was admitted on 5/16/2022.  Pain team was asked to see the patient for acute fracture pain, chronic low back pain, patient on Belbuca and oxycodone chronically.  Pain team had signed off, however was reconsulted for further medication review due to lethargy and confusion.  Admitted for left-sided hip pain status post mechanical fall at her assisted living facility. History of Lewy body dementia, multiple system atrophy, falls, traumatic injuries due to falls. Describes pain as 7-9/10 and aching in the shoulders, hips, thighs. The patient denies nausea, vomiting, constipation, diarrhea, chest pain, shortness of breath. The patient does not smoke and denies chemical dependency history.     Opioid Induced Respiratory Depression Risk Assessment:?High  (Low 0-1; Moderate 2-4; or High >4 or >/= 3 if two of the risk factors are age > 60 and opioid naive) due to the following risk factors: HARIS, COPD/Asthma/pulmonary disease, CHF, renal dysfunction, hepatic dysfunction, Obesity, Smoker, Age>60, >2 opioid therapies, concomitant CNS depressants, opioid naive status, or post surgical.     PLAN:   1) Pain is consistent with acute fracture pain, chronic pain.  High risk med use in the setting of dementia, age, 2 or more opioid therapies, CNS depressants.  Labs and imaging indicated: I have personally reviewed pertinent labs, tests, and radiologic imaging in patient's chart.  CT thoracic spine indicating acute T12 fracture. Treatment plan includes: multimodal pain approach, spine consult, palliative care consult, hospice consult, Hospital Medicine Service for medical management, PT, OT, ice, TLSO. Patient educated regarding: multimodal pain approach, medications as listed below, TLSO brace, watch for constipation and stool softeners. Patient is understanding of the plan. All questions and concerns  addressed to patient's satisfaction.   2)Multimodal Medication Therapy  Topical: Lidocaine ointment 4 times daily, Voltaren qid  NSAID'S: CrCl 67.5.  None -allergies noted to aspirin, ibuprofen  Muscle Relaxants: None  Adjuvants: APAP TID, gabapentin [phone]  Antidepressants/anxiolytics: Duloxetine 60 mg daily, carbidopa levodopa  mg 3 times daily, Seroquel 25 mg at bedtime, Zyprexa prn   Opioids: Buprenorphine was decreased to 75 mcg bid due to ongoing lethargy and increasing confusion over the past week with recent increase in dose, oxycodone 2.5 mg q6h prn  IV Pain medication: None   3)Non-medication interventions: Ice, TLSO brace  Acupuncture consult - offered and declined  Integrative consult - offered and declined  4)Constipation Prophylaxis: Scheduled and prn: MiraLAX daily, senna docusate daily, as needed sup, as needed MiraLAX, as needed senna docusate, as needed Fleet enema  5) Care Teams: Hospital Medicine Service, spine, pain, palliative care      -Opioid prescriber has been at [facility] pulled from system on 5/17/2022. This indicates   5/5/2022 Belbuca 150 mcg film #28  4/14/2022 Belbuca 75 mcg film #60  4/1/2022 gabapentin 300 mg #112  3/9/2022 Belbuca 75 mcg film #60  Has had fills for oxycodone 5 mg tablets in June, July, September, November 2021  Discharge Recommendations - We recommend prescribing the following at the time of discharge: Decreased Belbuca dose: rx ready in discharge, APAP, topicals if effective for acute pain. Patient will return to [facility] assisted living 5/25/2022 with [facility]     Called Pain clinic to update on changes to Belbuca dose 5/19/22.     Subjective:  Patient seen sitting up in chair.  TLSO brace on.  Patient eating breakfast.  Patient reports 7-9/10 aching pain in shoulders, hips, thighs.  She reports minimal back pain.  She has several questions about TLSO brace which were addressed.  Patient seen  "along with nursing at bedside.    Active Problems:    Major depressive disorder, recurrent episode (H)    Hypertension    Fall    Lewy body dementia with behavioral disturbance (H)    Urinary tract infection without hematuria, site unspecified    Closed stable burst fracture of twelfth thoracic vertebra, initial encounter (H)      Objective:  Vital signs in last 24 hours:  BP (!) 181/88 (BP Location: Left arm)   Pulse 77   Temp 97.1  F (36.2  C) (Oral)   Resp 14   Ht 1.676 m (5' 6\")   Wt 68 kg (150 lb)   SpO2 93%   BMI 24.21 kg/m    Weight:   Vitals:    05/16/22 1246   Weight: 68 kg (150 lb)      Weight change:   Body mass index is 24.21 kg/m .    Intake/Output last 3 shifts:  I/O last 3 completed shifts:  In: 2675 [P.O.:260; I.V.:2415]  Out: 650 [Urine:650]  Intake/Output this shift:  No intake/output data recorded.    Review of Systems:   As per subjective, all others negative.    Physical Exam:  General Appearance:  Alert, cooperative, no distress, sitting up in chair   Head:  Normocephalic, without obvious abnormality, atraumatic   Eyes:  PERRL, conjunctiva/corneas clear, EOM's intact   Nose: Nares normal, septum midline   Throat: Lips, mucosa, and tongue normal; teeth and gums normal   Neck: Supple, symmetrical, trachea midline   Back:    TLSO brace on   Lungs:   Clear to auscultation bilaterally, respirations unlabored, room air   Heart:  Regular rate and rhythm, S1, S2 normal    Abdomen:   Soft, non-tender, bowel sounds active all four quadrants,  no masses, no organomegaly    Extremities: Extremities normal, atraumatic, no cyanosis or edema   Skin: Skin warm, dry   Neurologic: Alert and oriented to person, place, Moves all 4 extremities     Imaging: Reviewed I have personally reviewed pertinent labs, tests, and radiologic imaging in patient's chart.      Labs: Reviewed I have personally reviewed pertinent labs, tests, and radiologic imaging in patient's chart.      Total time spent 25 minutes with " greater than 50% in consultation, education and coordination of care.     Also discussed with RN.     Jasmin STEPHEN, MALIP-C  Acute Care Pain Management Program  [facility] ([place], [place], [place])  Monday-Friday 8a-4p   Page via online paging system or call [phone]

## 2022-05-24 NOTE — PLAN OF CARE
Goal Outcome Evaluation:    Plan of Care Reviewed With: patient     Overall Patient Progress: improving    Alert and oriented x3, disoriented to time, denies pain, pure wick in place, Q 2 turns, cooperative and pleasant, +2 pitting edema @ right hand, warm to touch, no infiltrated noted, elevated hand with pillow which reduced some of the swelling, confused upon waking up, but was redirectable, takes pills whole in apple sauce, slept comfortable throughout the night, did not want Voltaren Gel as patient wanted to sleep.

## 2022-05-24 NOTE — PROGRESS NOTES
Palliative Care Chart note    Reviewed file.  Patient will return to [facility] 5/25/ with [facility].   Patient has some confusion but is redirectable,  Her code status is DNR DNI and her goals of care are comfort care via hospice.  She has no symptoms to manage.    Palliative care will follow peripherally til discharge.      Thank you    HAILEE Deras, NP-C, A[facility]  .

## 2022-05-24 NOTE — PLAN OF CARE
Problem: Plan of Care - These are the overarching goals to be used throughout the patient stay.    Goal: Plan of Care Review/Shift Note  Description: The Plan of Care Review/Shift note should be completed every shift.  The Outcome Evaluation is a brief statement about your assessment that the patient is improving, declining, or no change.  This information will be displayed automatically on your shift note.  Outcome: Ongoing, Progressing  Flowsheets (Taken 5/24/2022 1437)  Plan of Care Reviewed With: patient  Outcome Evaluation: Pt will remain comfortable  Overall Patient Progress: improving  Pt had good mobility today, assist of 1 and walker, and utilized the TLSO brace when up.  Sat in chair this afternoon and visited with son. Tolerating tylenol and topicals for pain management, pt states pain located in multiple areas of body but most prominently in left shoulder chronically.  Oral intake a bit better today per son.  BP's wide range of values.  Gentle fluids running via IV.    Goal Outcome Evaluation:    Plan of Care Reviewed With: patient     Overall Patient Progress: improving    Outcome Evaluation: Pt will remain comfortable

## 2022-05-25 VITALS
DIASTOLIC BLOOD PRESSURE: 88 MMHG | RESPIRATION RATE: 16 BRPM | TEMPERATURE: 97.5 F | HEART RATE: 81 BPM | HEIGHT: 66 IN | SYSTOLIC BLOOD PRESSURE: 188 MMHG | WEIGHT: 150 LBS | BODY MASS INDEX: 24.11 KG/M2 | OXYGEN SATURATION: 93 %

## 2022-05-25 PROCEDURE — 250N000013 HC RX MED GY IP 250 OP 250 PS 637: Performed by: HOSPITALIST

## 2022-05-25 PROCEDURE — 250N000013 HC RX MED GY IP 250 OP 250 PS 637: Performed by: FAMILY MEDICINE

## 2022-05-25 PROCEDURE — 250N000013 HC RX MED GY IP 250 OP 250 PS 637: Performed by: INTERNAL MEDICINE

## 2022-05-25 PROCEDURE — 99239 HOSP IP/OBS DSCHRG MGMT >30: CPT | Performed by: STUDENT IN AN ORGANIZED HEALTH CARE EDUCATION/TRAINING PROGRAM

## 2022-05-25 PROCEDURE — 250N000013 HC RX MED GY IP 250 OP 250 PS 637: Performed by: NURSE PRACTITIONER

## 2022-05-25 RX ORDER — OXYCODONE HYDROCHLORIDE 5 MG/1
2.5 TABLET ORAL EVERY 6 HOURS PRN
Qty: 16 TABLET | Refills: 0 | Status: SHIPPED | OUTPATIENT
Start: 2022-05-25 | End: 2022-05-29

## 2022-05-25 RX ADMIN — LIDOCAINE: 50 OINTMENT TOPICAL at 08:30

## 2022-05-25 RX ADMIN — SENNOSIDES AND DOCUSATE SODIUM 1 TABLET: 50; 8.6 TABLET ORAL at 08:17

## 2022-05-25 RX ADMIN — LEVOTHYROXINE SODIUM 100 MCG: 0.05 TABLET ORAL at 06:34

## 2022-05-25 RX ADMIN — POLYETHYLENE GLYCOL AND PROPYLENE GLYCOL 2 DROP: 4; 3 SOLUTION/ DROPS OPHTHALMIC at 08:20

## 2022-05-25 RX ADMIN — ACETAMINOPHEN 1000 MG: 500 TABLET ORAL at 08:17

## 2022-05-25 RX ADMIN — SIMETHICONE 125 MG: 125 TABLET, CHEWABLE ORAL at 08:19

## 2022-05-25 RX ADMIN — LOSARTAN POTASSIUM 50 MG: 50 TABLET, FILM COATED ORAL at 08:17

## 2022-05-25 RX ADMIN — HYDRALAZINE HYDROCHLORIDE 25 MG: 25 TABLET, FILM COATED ORAL at 06:33

## 2022-05-25 RX ADMIN — Medication 1 CAPSULE: at 08:17

## 2022-05-25 RX ADMIN — CARBIDOPA AND LEVODOPA 1 TABLET: 25; 100 TABLET ORAL at 08:19

## 2022-05-25 RX ADMIN — DULOXETINE HYDROCHLORIDE 60 MG: 60 CAPSULE, DELAYED RELEASE ORAL at 08:16

## 2022-05-25 RX ADMIN — OXYCODONE HYDROCHLORIDE 2.5 MG: 5 TABLET ORAL at 10:16

## 2022-05-25 RX ADMIN — POLYETHYLENE GLYCOL 3350 17 G: 17 POWDER, FOR SOLUTION ORAL at 08:34

## 2022-05-25 RX ADMIN — NYSTATIN: 100000 CREAM TOPICAL at 09:38

## 2022-05-25 RX ADMIN — GABAPENTIN 300 MG: 300 CAPSULE ORAL at 08:17

## 2022-05-25 RX ADMIN — Medication 50 MCG: at 08:16

## 2022-05-25 ASSESSMENT — ACTIVITIES OF DAILY LIVING (ADL)
ADLS_ACUITY_SCORE: 48
ADLS_ACUITY_SCORE: 42
ADLS_ACUITY_SCORE: 42
ADLS_ACUITY_SCORE: 44
ADLS_ACUITY_SCORE: 41

## 2022-05-25 NOTE — PROGRESS NOTES
Care Management Discharge Note    Discharge Date: 05/25/2022       Discharge Disposition: Assisted Living    Discharge Services: [facility]    Discharge DME:  (TBD per hospice)    Discharge Transportation: agency    Private pay costs discussed: transportation costs    PAS Confirmation Code:  Not indicated    Education Provided on the Discharge Plan:  AVS per bedside RN.    Persons Notified of Discharge Plans: patient and daughter Alysa    Patient/Family in Agreement with the Plan: yes    Handoff Referral Completed: Yes    Additional Information:  Chart reviewed. CM updated Alysa (daughter) that patient will discharge home today at 10 am. Patient will return to [facility] with [facility]. CM has arranged for [employer] transportation. [facility] has been updated will admit the patient once she returns to her assisted living. CM left  to confirm that [facility] nursing staff knows the resident is returning at 10 am.         Evette Perez RN

## 2022-05-25 NOTE — DISCHARGE SUMMARY
[facility]  Hospitalist Discharge Summary      Date of Admission:  5/16/2022  Date of Discharge:  5/25/2022 10:14 AM  Discharging Provider: Aysha Horn MD  Discharge Service: Hospitalist Service    Discharge Diagnoses   Lewy body dementia  Parkinson's disease? Multiple system atrophy  Chronic pain with opiate tolerance  Acute metabolic encephalopathy, delirium  Agitation, Combativeness  Unwitnessed fall with T12 compression fracture  Essential hypertension  Chronic right shoulder pain  Hospice enrollment    Follow-ups Needed After Discharge   Follow-up Appointments     Follow Up Care      1. Please follow-up with Dr. Vargas's team in 2-3 weeks at [facility] for evaluation of your spine fracture. Call our scheduling   line at [phone] to make an appointment.    2. Please follow-up with Dr. Moreno's team at [facility] for your   shoulders as needed. Call our scheduling line at [phone] to make an   appointment.         Follow Up and recommended labs and tests      Follow up with Nursing home physician.      Recommend following up with your own primary care physician within 1-2   weeks for post-hospital visit as well.         Physical Therapy Instructions      Wear TLSO brace at all times while out of bed if back pain is present.         {  Discharge Disposition   Discharged to nursing facility  Condition at discharge: Stable    Hospital Course   Mehreen Camara is a 82 year old old female who presented to the emergency department for fall at her nursing facility.  ED evaluation with T12 compression fracture.  Patient was too painful to sit up or ambulate.  Other imaging was negative for acute fracture.  She has a history of Lewy body dementia, multiple system atrophy. Having ongoing intermittent sundowning, agitation, and delirium.  During hospital stay was evaluated by multiple consultants including the pain medicine service, orthopedic, psychiatry,  PT OT and palliative medicine.  Following multiple discussions with her family, decision made ultimately to enroll her in hospice on discharge.  Patient will be admitted to [facility] hospice on May 25, 2022    Specifically the following conditions were managed in the hospital as below    #Lewy body dementia  #Parkinson's disease? Multiple system atrophy  #Chronic pain with opiate tolerance  #Acute metabolic encephalopathy, delirium  #Agitation, Combativeness  As an outpatient she takes buprenorphine twice daily, Sinemet, duloxetine, gabapentin, oxycodone, Seroquel.  Pain team followed and managing pain meds as below  Increased sinemet to tid, pt and daughter noticed improvement with higher dose, recently decreased.   Due to encephalopathy/somnolence, the following medications were changed during this hospital stay.               -Stopped PRN zyprexa               -Reduced dose of gabapentin at bedtime to 300mg from 600mg               -Reduced dose of seroquel from 25mg to 12.5mg PO at bedtime PRN for agitation               -Reduced dose of oxycodone from 5mg to 2.5mg q 6hours PRN for pain      Unwitnessed fall with T12 compression fracture  Spine consult ordered in ED to give further recommendations  She is opiate tolerant.  Was managed with scheduled Tylenol, higher dose oxycodone as needed, PT and OT evaluations  IV Dilaudid as needed for breakthrough pain  Topical: Lidocaine ointment 4 times daily, Voltaren qid  Adjuvants: APAP TID, gabapentin [phone]  Opioids: Buprenorphine was decreased to 75 mcg bid due to ongoing lethargy and increasing confusion over the past week with recent increase in dose, oxycodone 5 mg q3h prn  TLSO brace ordered by orthopedic surgery  Acupuncture consult - offered and declined  Constipation Prophylaxis: Scheduled and prn: MiraLAX daily, senna docusate daily, as needed sup, as needed MiraLAX, as needed senna docusate, as needed Fleet enema    Essential hypertension  Blood  pressure quite elevated initially, improved now at discharge  As an outpatient she takes losartan, hydralazine, will continue these  Increased PTA hydralazine from 5mg twice daily to 25 mg p.o. 3 times daily      Chronic right shoulder pain  - Pain control as above     Abnormal urinalysis  Difficult to assess for symptoms, white count only slightly elevated and urine  She does report some increased urinary frequency  Continue ciprofloxacin as started in emergency department, follow urine culture     Systolic murmur  Mild aortic stenosis in 2018  Can have this further evaluated as an outpatient, no recent echo in our system       Consultations This Hospital Stay   SOCIAL WORK IP CONSULT  PHYSICAL THERAPY ADULT IP CONSULT  OCCUPATIONAL THERAPY ADULT IP CONSULT  PAIN MANAGEMENT ADULT IP CONSULT  SPINE SURGERY ADULT IP CONSULT  ORTHOSIS SPINAL IP CONSULT  PHARMACY IP CONSULT  PHYSICAL THERAPY ADULT IP CONSULT  OCCUPATIONAL THERAPY ADULT IP CONSULT  PAIN MANAGEMENT ADULT IP CONSULT  SOCIAL WORK IP CONSULT  PALLIATIVE CARE ADULT IP CONSULT  INPATIENT HOSPICE ADULT CONSULT    Code Status   No CPR- Do NOT Intubate    Time Spent on this Encounter   I, Aysha Horn MD, personally saw the patient today and spent greater than 30 minutes discharging this patient.       Aysha Horn MD  [address] [address]  Phone: [phone]  Fax: [phone]  ______________________________________________________________________    Physical Exam   Vital Signs: Temp: 97.6  F (36.4  C) Temp src: Oral BP: (!) 173/75 Pulse: 76   Resp: 16 SpO2: 92 % O2 Device: None (Room air)    Weight: 150 lbs 0 oz   Gen: A&Ox3. Appears comfortable. Sitting up at bedside eating breakfast. More alert today than prior days.  Lungs: clear to auscultation  Heart, S1-S2 heard, regular  Abdomen; soft and nontender, bowel sounds present  Extremities ;sensation is intact  in bilateral lower extremities, minimal pedal edema  Neuro; no focal neurological deficits    Primary Care Physician   Dede Zamorano    Discharge Orders      General info for SNF    Length of Stay Estimate: Short Term Care: Estimated # of Days <30  Condition at Discharge: Improving  Level of care:skilled   Rehabilitation Potential: Good  Admission H&P remains valid and up-to-date: Yes  Recent Chemotherapy: N/A  Use Nursing Home Standing Orders: Yes     Mantoux instructions    Give two-step Mantoux (PPD) Per Facility Policy Yes     Follow Up and recommended labs and tests    Follow up with Nursing home physician.      Recommend following up with your own primary care physician within 1-2 weeks for post-hospital visit as well.     Activity - Up with nursing assistance     Reason for your hospital stay    T12 compression fracture, orthopedic surgery evaluation, TLSO brace fitting team, hospital medicine care.     Physical Therapy Instructions    Wear TLSO brace at all times while out of bed if back pain is present.     Follow Up Care    1. Please follow-up with Dr. Vargas's team in 2-3 weeks at [facility] for evaluation of your spine fracture. Call our scheduling line at [phone] to make an appointment.    2. Please follow-up with Dr. Moreno's team at [facility] for your shoulders as needed. Call our scheduling line at [phone] to make an appointment.     Physical Therapy Adult Consult    Evaluate and treat as clinically indicated.    Reason:  Physical deconditioning and/or ALDs/iADLs and mobility, fall risk. Recommended by our PT.     Occupational Therapy Adult Consult    Evaluate and treat as clinically indicated.    Reason:  Physical deconditioning and/or ALDs/iADLs and mobility, fall risk. Recommended by our OT.     Fall precautions     Advance Diet as Tolerated    Follow this diet upon discharge: Orders Placed This Encounter      Regular Diet Adult         Significant Results and  Procedures   Most Recent 3 CBC's:Recent Labs   Lab Test 05/22/22  0656 05/20/22  1429 05/16/22  1339   WBC 5.3 4.7 4.6   HGB 12.8 13.7 12.8   MCV 98 101* 98    182 205     Most Recent 3 BMP's:Recent Labs   Lab Test 05/22/22  0656 05/20/22  1429 05/16/22  1339    147* 143   POTASSIUM 4.0 4.0 4.1   CHLORIDE 110* 106 105   CO2 26 31 29   BUN 20 15 18   CR 0.70 0.69 0.69   ANIONGAP 8 10 9   YADIRA 8.8 9.3 9.2   GLC 88 87 124   ,   Results for orders placed or performed during the hospital encounter of 05/16/22   Cervical spine CT w/o contrast    Narrative    EXAM: CT HEAD W/O CONTRAST, CT CERVICAL SPINE W/O CONTRAST, CT THORACIC SPINE W/O CONTRAST, CT LUMBAR SPINE W/O CONTRAST  LOCATION: [facility]  DATE/TIME: 5/16/2022 1:56 PM    INDICATION: Acute traumatic head, neck, and back injury; mechanical ground-level fall after tripping. Left forehead swelling.  COMPARISON: CTA head and cervicothoracic spine 12/23/2020 and CT lumbar spine 02/27/2022.  TECHNIQUE:   1. Routine CT head without IV contrast. Multiplanar reformats. Dose reduction techniques were used.  2. Routine CT cervical spine without IV contrast. Multiplanar reformats. Dose reduction techniques were used.  3. Routine CT thoracic spine without IV contrast. Multiplanar reformats. Dose reduction techniques were used.  4. Routine CT lumbar spine without IV contrast. Multiplanar reformats. Dose reduction techniques were used.    FINDINGS:   HEAD CT:   INTRACRANIAL CONTENTS: No intracranial hemorrhage, extraaxial collection, or mass effect.  No CT evidence of acute infarct. Mild presumed chronic small vessel ischemic changes. Mild to moderate generalized volume loss. No hydrocephalus.     VISUALIZED ORBITS/SINUSES/MASTOIDS: Prior bilateral cataract surgery. Visualized portions of the orbits are otherwise unremarkable. No paranasal sinus mucosal disease. No middle ear or mastoid effusion.    BONES/SOFT TISSUES: Mild soft tissue  swelling over the left forehead without underlying acute calvarial fracture.      CERVICAL SPINE CT:   VERTEBRAE: Diffuse bone mineralization. Ankylosis of the C5-C7 vertebral bodies with mild chronic degenerative anterior wedging of the superior endplate of the C5 vertebral body. Otherwise normal vertebral body heights. Mild cervical dextrocurvature.   Reversal of the usual cervical lordosis centered at C4-C5 with slight anterolisthesis of C3 on C4 and of C7 on T1. No evidence for acute fracture or traumatic subluxation.      CANAL/FORAMINA: No high-grade spinal canal stenosis. Severe degenerative neuroforaminal stenosis on the left at C3-C4 and C4-C5.    PARASPINAL: Paraspinous soft tissues are unremarkable.       THORACIC SPINE CT:   VERTEBRAE: Diffuse bone demineralization. There is an acute burst-type compression fracture deformity of the T12 vertebral body with approximately 50% height loss centrally and 3 mm retropulsion. No posttraumatic spinal canal stenosis or segmental   kyphosis. Otherwise normal vertebral body heights. Thoracic dextrocurvature measures approximately 40 degrees from the superior T6 endplate to the inferior T11 endplate, apex at T8-T9. Upper thoracic levocurvature measures 18 degrees from the superior T1   endplate to the inferior T5 endplate, apex at T2-T3. Normal sagittal alignment is grossly maintained.    CANAL/FORAMINA: No high-grade spinal canal or neural foraminal stenosis. Moderate degenerative neuroforaminal stenosis on the left at T8-T9 and T9-T10.    PARASPINAL: Visualized ribs are intact. Please see dedicated chest CT report for pulmonary and soft tissue thoracic findings. Visualized lung fields are clear.      LUMBAR SPINE CT:   VERTEBRAE: Diffuse bone mineralization. Unchanged chronic compression fracture deformities of the inferior endplate of L3 and superior endplates of L4 on L5. Otherwise normally maintained vertebral body heights. Lumbar levocurvature measures 30  degrees   from the superior L1 endplate to the inferior L5 endplate. No evidence for interval acute fracture or posttraumatic subluxation.     CANAL/FORAMINA: No high-grade spinal canal stenosis. Mild to moderate degenerative spinal canal stenosis at L3-L4. Severe degenerative neuroforaminal stenosis on the left at L3-L4.    PARASPINAL: Mild degenerative change in the bilateral sacroiliac joints. Please see dedicated abdomen/pelvis CT report for pulmonary and thoracic soft tissue findings. Partially visualized right total hip arthroplasty.          Impression    IMPRESSION:  HEAD CT:  1.  No acute intracranial abnormality.  2.  Mild left forehead soft tissue contusion without underlying acute calvarial fracture.  3.  Mild to moderate global brain parenchymal volume loss with presumed sequelae of mild chronic small vessel ischemic disease.    CERVICAL SPINE CT:  1.  No evidence for acute fracture or post traumatic subluxation of the cervical spine by CT imaging.  2.  No high-grade spinal canal stenosis.  3.  Severe degenerative neuroforaminal stenosis on the left at C3-C4 and C4-C5.    THORACIC SPINE CT:  1.  Acute burst-type compression fracture deformity of the T12 vertebral body with 50% height loss and 3 mm retropulsion. No posttraumatic spinal canal stenosis or segmental kyphosis.  2.  No high-grade spinal canal or neuroforaminal stenosis.    LUMBAR SPINE CT:  1.  No evidence for acute fracture or post traumatic subluxation of the lumbar spine by CT imaging.  2.  No high-grade spinal canal or neuroforaminal stenosis.       Head CT w/o contrast    Narrative    EXAM: CT HEAD W/O CONTRAST, CT CERVICAL SPINE W/O CONTRAST, CT THORACIC SPINE W/O CONTRAST, CT LUMBAR SPINE W/O CONTRAST  LOCATION: [facility]  DATE/TIME: 5/16/2022 1:56 PM    INDICATION: Acute traumatic head, neck, and back injury; mechanical ground-level fall after tripping. Left forehead swelling.  COMPARISON: CTA head and  cervicothoracic spine 12/23/2020 and CT lumbar spine 02/27/2022.  TECHNIQUE:   1. Routine CT head without IV contrast. Multiplanar reformats. Dose reduction techniques were used.  2. Routine CT cervical spine without IV contrast. Multiplanar reformats. Dose reduction techniques were used.  3. Routine CT thoracic spine without IV contrast. Multiplanar reformats. Dose reduction techniques were used.  4. Routine CT lumbar spine without IV contrast. Multiplanar reformats. Dose reduction techniques were used.    FINDINGS:   HEAD CT:   INTRACRANIAL CONTENTS: No intracranial hemorrhage, extraaxial collection, or mass effect.  No CT evidence of acute infarct. Mild presumed chronic small vessel ischemic changes. Mild to moderate generalized volume loss. No hydrocephalus.     VISUALIZED ORBITS/SINUSES/MASTOIDS: Prior bilateral cataract surgery. Visualized portions of the orbits are otherwise unremarkable. No paranasal sinus mucosal disease. No middle ear or mastoid effusion.    BONES/SOFT TISSUES: Mild soft tissue swelling over the left forehead without underlying acute calvarial fracture.      CERVICAL SPINE CT:   VERTEBRAE: Diffuse bone mineralization. Ankylosis of the C5-C7 vertebral bodies with mild chronic degenerative anterior wedging of the superior endplate of the C5 vertebral body. Otherwise normal vertebral body heights. Mild cervical dextrocurvature.   Reversal of the usual cervical lordosis centered at C4-C5 with slight anterolisthesis of C3 on C4 and of C7 on T1. No evidence for acute fracture or traumatic subluxation.      CANAL/FORAMINA: No high-grade spinal canal stenosis. Severe degenerative neuroforaminal stenosis on the left at C3-C4 and C4-C5.    PARASPINAL: Paraspinous soft tissues are unremarkable.       THORACIC SPINE CT:   VERTEBRAE: Diffuse bone demineralization. There is an acute burst-type compression fracture deformity of the T12 vertebral body with approximately 50% height loss centrally and 3 mm  retropulsion. No posttraumatic spinal canal stenosis or segmental   kyphosis. Otherwise normal vertebral body heights. Thoracic dextrocurvature measures approximately 40 degrees from the superior T6 endplate to the inferior T11 endplate, apex at T8-T9. Upper thoracic levocurvature measures 18 degrees from the superior T1   endplate to the inferior T5 endplate, apex at T2-T3. Normal sagittal alignment is grossly maintained.    CANAL/FORAMINA: No high-grade spinal canal or neural foraminal stenosis. Moderate degenerative neuroforaminal stenosis on the left at T8-T9 and T9-T10.    PARASPINAL: Visualized ribs are intact. Please see dedicated chest CT report for pulmonary and soft tissue thoracic findings. Visualized lung fields are clear.      LUMBAR SPINE CT:   VERTEBRAE: Diffuse bone mineralization. Unchanged chronic compression fracture deformities of the inferior endplate of L3 and superior endplates of L4 on L5. Otherwise normally maintained vertebral body heights. Lumbar levocurvature measures 30 degrees   from the superior L1 endplate to the inferior L5 endplate. No evidence for interval acute fracture or posttraumatic subluxation.     CANAL/FORAMINA: No high-grade spinal canal stenosis. Mild to moderate degenerative spinal canal stenosis at L3-L4. Severe degenerative neuroforaminal stenosis on the left at L3-L4.    PARASPINAL: Mild degenerative change in the bilateral sacroiliac joints. Please see dedicated abdomen/pelvis CT report for pulmonary and thoracic soft tissue findings. Partially visualized right total hip arthroplasty.          Impression    IMPRESSION:  HEAD CT:  1.  No acute intracranial abnormality.  2.  Mild left forehead soft tissue contusion without underlying acute calvarial fracture.  3.  Mild to moderate global brain parenchymal volume loss with presumed sequelae of mild chronic small vessel ischemic disease.    CERVICAL SPINE CT:  1.  No evidence for acute fracture or post traumatic  subluxation of the cervical spine by CT imaging.  2.  No high-grade spinal canal stenosis.  3.  Severe degenerative neuroforaminal stenosis on the left at C3-C4 and C4-C5.    THORACIC SPINE CT:  1.  Acute burst-type compression fracture deformity of the T12 vertebral body with 50% height loss and 3 mm retropulsion. No posttraumatic spinal canal stenosis or segmental kyphosis.  2.  No high-grade spinal canal or neuroforaminal stenosis.    LUMBAR SPINE CT:  1.  No evidence for acute fracture or post traumatic subluxation of the lumbar spine by CT imaging.  2.  No high-grade spinal canal or neuroforaminal stenosis.       CT Thoracic Spine w/o Contrast    Narrative    EXAM: CT HEAD W/O CONTRAST, CT CERVICAL SPINE W/O CONTRAST, CT THORACIC SPINE W/O CONTRAST, CT LUMBAR SPINE W/O CONTRAST  LOCATION: [facility]  DATE/TIME: 5/16/2022 1:56 PM    INDICATION: Acute traumatic head, neck, and back injury; mechanical ground-level fall after tripping. Left forehead swelling.  COMPARISON: CTA head and cervicothoracic spine 12/23/2020 and CT lumbar spine 02/27/2022.  TECHNIQUE:   1. Routine CT head without IV contrast. Multiplanar reformats. Dose reduction techniques were used.  2. Routine CT cervical spine without IV contrast. Multiplanar reformats. Dose reduction techniques were used.  3. Routine CT thoracic spine without IV contrast. Multiplanar reformats. Dose reduction techniques were used.  4. Routine CT lumbar spine without IV contrast. Multiplanar reformats. Dose reduction techniques were used.    FINDINGS:   HEAD CT:   INTRACRANIAL CONTENTS: No intracranial hemorrhage, extraaxial collection, or mass effect.  No CT evidence of acute infarct. Mild presumed chronic small vessel ischemic changes. Mild to moderate generalized volume loss. No hydrocephalus.     VISUALIZED ORBITS/SINUSES/MASTOIDS: Prior bilateral cataract surgery. Visualized portions of the orbits are otherwise unremarkable. No paranasal  sinus mucosal disease. No middle ear or mastoid effusion.    BONES/SOFT TISSUES: Mild soft tissue swelling over the left forehead without underlying acute calvarial fracture.      CERVICAL SPINE CT:   VERTEBRAE: Diffuse bone mineralization. Ankylosis of the C5-C7 vertebral bodies with mild chronic degenerative anterior wedging of the superior endplate of the C5 vertebral body. Otherwise normal vertebral body heights. Mild cervical dextrocurvature.   Reversal of the usual cervical lordosis centered at C4-C5 with slight anterolisthesis of C3 on C4 and of C7 on T1. No evidence for acute fracture or traumatic subluxation.      CANAL/FORAMINA: No high-grade spinal canal stenosis. Severe degenerative neuroforaminal stenosis on the left at C3-C4 and C4-C5.    PARASPINAL: Paraspinous soft tissues are unremarkable.       THORACIC SPINE CT:   VERTEBRAE: Diffuse bone demineralization. There is an acute burst-type compression fracture deformity of the T12 vertebral body with approximately 50% height loss centrally and 3 mm retropulsion. No posttraumatic spinal canal stenosis or segmental   kyphosis. Otherwise normal vertebral body heights. Thoracic dextrocurvature measures approximately 40 degrees from the superior T6 endplate to the inferior T11 endplate, apex at T8-T9. Upper thoracic levocurvature measures 18 degrees from the superior T1   endplate to the inferior T5 endplate, apex at T2-T3. Normal sagittal alignment is grossly maintained.    CANAL/FORAMINA: No high-grade spinal canal or neural foraminal stenosis. Moderate degenerative neuroforaminal stenosis on the left at T8-T9 and T9-T10.    PARASPINAL: Visualized ribs are intact. Please see dedicated chest CT report for pulmonary and soft tissue thoracic findings. Visualized lung fields are clear.      LUMBAR SPINE CT:   VERTEBRAE: Diffuse bone mineralization. Unchanged chronic compression fracture deformities of the inferior endplate of L3 and superior endplates of L4 on  L5. Otherwise normally maintained vertebral body heights. Lumbar levocurvature measures 30 degrees   from the superior L1 endplate to the inferior L5 endplate. No evidence for interval acute fracture or posttraumatic subluxation.     CANAL/FORAMINA: No high-grade spinal canal stenosis. Mild to moderate degenerative spinal canal stenosis at L3-L4. Severe degenerative neuroforaminal stenosis on the left at L3-L4.    PARASPINAL: Mild degenerative change in the bilateral sacroiliac joints. Please see dedicated abdomen/pelvis CT report for pulmonary and thoracic soft tissue findings. Partially visualized right total hip arthroplasty.          Impression    IMPRESSION:  HEAD CT:  1.  No acute intracranial abnormality.  2.  Mild left forehead soft tissue contusion without underlying acute calvarial fracture.  3.  Mild to moderate global brain parenchymal volume loss with presumed sequelae of mild chronic small vessel ischemic disease.    CERVICAL SPINE CT:  1.  No evidence for acute fracture or post traumatic subluxation of the cervical spine by CT imaging.  2.  No high-grade spinal canal stenosis.  3.  Severe degenerative neuroforaminal stenosis on the left at C3-C4 and C4-C5.    THORACIC SPINE CT:  1.  Acute burst-type compression fracture deformity of the T12 vertebral body with 50% height loss and 3 mm retropulsion. No posttraumatic spinal canal stenosis or segmental kyphosis.  2.  No high-grade spinal canal or neuroforaminal stenosis.    LUMBAR SPINE CT:  1.  No evidence for acute fracture or post traumatic subluxation of the lumbar spine by CT imaging.  2.  No high-grade spinal canal or neuroforaminal stenosis.       Lumbar spine CT w/o contrast    Narrative    EXAM: CT HEAD W/O CONTRAST, CT CERVICAL SPINE W/O CONTRAST, CT THORACIC SPINE W/O CONTRAST, CT LUMBAR SPINE W/O CONTRAST  LOCATION: [facility]  DATE/TIME: 5/16/2022 1:56 PM    INDICATION: Acute traumatic head, neck, and back injury;  mechanical ground-level fall after tripping. Left forehead swelling.  COMPARISON: CTA head and cervicothoracic spine 12/23/2020 and CT lumbar spine 02/27/2022.  TECHNIQUE:   1. Routine CT head without IV contrast. Multiplanar reformats. Dose reduction techniques were used.  2. Routine CT cervical spine without IV contrast. Multiplanar reformats. Dose reduction techniques were used.  3. Routine CT thoracic spine without IV contrast. Multiplanar reformats. Dose reduction techniques were used.  4. Routine CT lumbar spine without IV contrast. Multiplanar reformats. Dose reduction techniques were used.    FINDINGS:   HEAD CT:   INTRACRANIAL CONTENTS: No intracranial hemorrhage, extraaxial collection, or mass effect.  No CT evidence of acute infarct. Mild presumed chronic small vessel ischemic changes. Mild to moderate generalized volume loss. No hydrocephalus.     VISUALIZED ORBITS/SINUSES/MASTOIDS: Prior bilateral cataract surgery. Visualized portions of the orbits are otherwise unremarkable. No paranasal sinus mucosal disease. No middle ear or mastoid effusion.    BONES/SOFT TISSUES: Mild soft tissue swelling over the left forehead without underlying acute calvarial fracture.      CERVICAL SPINE CT:   VERTEBRAE: Diffuse bone mineralization. Ankylosis of the C5-C7 vertebral bodies with mild chronic degenerative anterior wedging of the superior endplate of the C5 vertebral body. Otherwise normal vertebral body heights. Mild cervical dextrocurvature.   Reversal of the usual cervical lordosis centered at C4-C5 with slight anterolisthesis of C3 on C4 and of C7 on T1. No evidence for acute fracture or traumatic subluxation.      CANAL/FORAMINA: No high-grade spinal canal stenosis. Severe degenerative neuroforaminal stenosis on the left at C3-C4 and C4-C5.    PARASPINAL: Paraspinous soft tissues are unremarkable.       THORACIC SPINE CT:   VERTEBRAE: Diffuse bone demineralization. There is an acute burst-type compression  fracture deformity of the T12 vertebral body with approximately 50% height loss centrally and 3 mm retropulsion. No posttraumatic spinal canal stenosis or segmental   kyphosis. Otherwise normal vertebral body heights. Thoracic dextrocurvature measures approximately 40 degrees from the superior T6 endplate to the inferior T11 endplate, apex at T8-T9. Upper thoracic levocurvature measures 18 degrees from the superior T1   endplate to the inferior T5 endplate, apex at T2-T3. Normal sagittal alignment is grossly maintained.    CANAL/FORAMINA: No high-grade spinal canal or neural foraminal stenosis. Moderate degenerative neuroforaminal stenosis on the left at T8-T9 and T9-T10.    PARASPINAL: Visualized ribs are intact. Please see dedicated chest CT report for pulmonary and soft tissue thoracic findings. Visualized lung fields are clear.      LUMBAR SPINE CT:   VERTEBRAE: Diffuse bone mineralization. Unchanged chronic compression fracture deformities of the inferior endplate of L3 and superior endplates of L4 on L5. Otherwise normally maintained vertebral body heights. Lumbar levocurvature measures 30 degrees   from the superior L1 endplate to the inferior L5 endplate. No evidence for interval acute fracture or posttraumatic subluxation.     CANAL/FORAMINA: No high-grade spinal canal stenosis. Mild to moderate degenerative spinal canal stenosis at L3-L4. Severe degenerative neuroforaminal stenosis on the left at L3-L4.    PARASPINAL: Mild degenerative change in the bilateral sacroiliac joints. Please see dedicated abdomen/pelvis CT report for pulmonary and thoracic soft tissue findings. Partially visualized right total hip arthroplasty.          Impression    IMPRESSION:  HEAD CT:  1.  No acute intracranial abnormality.  2.  Mild left forehead soft tissue contusion without underlying acute calvarial fracture.  3.  Mild to moderate global brain parenchymal volume loss with presumed sequelae of mild chronic small vessel  ischemic disease.    CERVICAL SPINE CT:  1.  No evidence for acute fracture or post traumatic subluxation of the cervical spine by CT imaging.  2.  No high-grade spinal canal stenosis.  3.  Severe degenerative neuroforaminal stenosis on the left at C3-C4 and C4-C5.    THORACIC SPINE CT:  1.  Acute burst-type compression fracture deformity of the T12 vertebral body with 50% height loss and 3 mm retropulsion. No posttraumatic spinal canal stenosis or segmental kyphosis.  2.  No high-grade spinal canal or neuroforaminal stenosis.    LUMBAR SPINE CT:  1.  No evidence for acute fracture or post traumatic subluxation of the lumbar spine by CT imaging.  2.  No high-grade spinal canal or neuroforaminal stenosis.       CT Chest Abdomen Pelvis w/o Contrast    Narrative    EXAM: CT CHEST ABDOMEN PELVIS W/O CONTRAST  LOCATION: [facility]  DATE/TIME: 5/16/2022 1:57 PM    INDICATION: Injury to the chest, abdomen and pelvis during a fall with chest, abdominal, and pelvic pain.  COMPARISON: CT AP 02/27/2022.  TECHNIQUE: CT scan of the chest, abdomen, and pelvis was performed without IV contrast. Multiplanar reformats were obtained. Dose reduction techniques were used.   CONTRAST: None.    FINDINGS:   LUNGS AND PLEURA: No pneumothorax or pleural effusion. Strands of fibrosis in the lingula. Lungs otherwise clear.    MEDIASTINUM/AXILLAE: Heart size normal with aortic valve leaflet, coronary artery and mitral annular calcification. No pericardial effusion. Calcified atheromatous plaque throughout the normal caliber thoracic aorta. Normal caliber central pulmonary   arteries. No lymphadenopathy.    CORONARY ARTERY CALCIFICATION: Moderate.    HEPATOBILIARY: Liver is normal. Stable mild bile duct dilatation with no radiodense stone or mass consistent with reservoir effect from prior cholecystectomy.    PANCREAS: Normal.    SPLEEN: Spleen size normal.    ADRENAL GLANDS: Benign 2 cm adenoma left adrenal gland is  unchanged and requires no follow-up. Adrenal glands otherwise normal.    KIDNEY/BLADDER: Well-positioned Diaz catheter in the bladder. Kidneys, ureters and bladder are otherwise normal.    BOWEL: No bowel obstruction or inflammatory change. No free air or free fluid.    LYMPH NODES: No lymphadenopathy.    VASCULATURE: Aortobiiliac stent.    PELVIC ORGANS: No pelvic mass or fluid.    MUSCULOSKELETAL: Slightly comminuted acute compression fracture T12 vertebral body. Chronic compression fracture superior endplate L4 vertebral body and healed bilateral pubic rami and sacral alar fractures.  Bones are demineralized. Right hip   arthroplasty.       Impression    IMPRESSION:  1.  Slightly comminuted acute compression fracture T12 vertebral body.  2.  No additional acute findings identified.  3.  Chronic compression fracture superior endplate L4 vertebral body and healed bilateral pubic rami and sacral alar fractures.     XR Femur Left 2 Views    Narrative    EXAM: XR FEMUR LEFT 2 VIEW  LOCATION: [facility]  DATE/TIME: 5/16/2022 2:29 PM    INDICATION: Pain after fall.  COMPARISON: None.      Impression    IMPRESSION:  1. Old healed pelvic fractures partially included in the field-of-view.  2. Total knee arthroplasty.  3. Arterial calcifications.  4. There is no evidence of a femur fracture or osteonecrosis.   XR Knee Left 1/2 Views    Narrative    EXAM: XR KNEE LT 1/2 VW  LOCATION: [facility]  DATE/TIME: 5/16/2022 2:15 PM    INDICATION: Left knee pain following fall.  COMPARISON: None.      Impression    IMPRESSION: Left total knee arthroplasty with patellar resurfacing. Components project in the expected position. No acute displaced periprosthetic fracture or finding for component failure. No significant left knee joint effusion. Distal left quadriceps   insertional enthesopathy at the patella. Arterial calcification.   XR Pelvis 1/2 Views    Narrative    EXAM: XR  PELVIS 1/2 VW  LOCATION: [facility]  DATE/TIME: 5/16/2022 2:15 PM    INDICATION: Left hip pain fall.  COMPARISON: None.      Impression    IMPRESSION:   1. Old healed sacral and pubic ramus fractures.  2. Right total hip arthroplasty and vascular stents.  3. Diffuse bone demineralization. No evidence of an acute fracture.     *Note: Due to a large number of results and/or encounters for the requested time period, some results have not been displayed. A complete set of results can be found in Results Review.       Discharge Medications   Current Discharge Medication List      CONTINUE these medications which have CHANGED    Details   Buprenorphine HCl (BELBUCA) 75 MCG FILM buccal film Place 1 Film (75 mcg) inside cheek every 12 hours  Qty: 10 Film, Refills: 0    Associated Diagnoses: Other chronic pain      carbidopa-levodopa (SINEMET)  MG tablet Take 1 tablet by mouth 3 times daily  Qty: 90 tablet, Refills: 0    Associated Diagnoses: Lewy body dementia with behavioral disturbance (H); Movement disorder      !! gabapentin (NEURONTIN) 300 MG capsule Take 1 capsule (300 mg) by mouth At Bedtime  Qty: 30 capsule, Refills: 0    Associated Diagnoses: Lewy body dementia with behavioral disturbance (H)      hydrALAZINE (APRESOLINE) 25 MG tablet Take 1 tablet (25 mg) by mouth every 8 hours  Qty: 30 tablet, Refills: 0    Associated Diagnoses: Primary hypertension      oxyCODONE (ROXICODONE) 5 MG tablet Take 0.5 tablets (2.5 mg) by mouth every 6 hours as needed for moderate to severe pain  Qty: 16 tablet, Refills: 0    Associated Diagnoses: Multiple closed fractures of pelvis without disruption of pelvic ring, initial encounter (H); Compression fracture of L4 lumbar vertebra, closed, initial encounter (H); Compression fracture of L4 vertebra, initial encounter (H); Chronic bilateral low back pain with bilateral sciatica       !! - Potential duplicate medications found. Please discuss with  provider.      CONTINUE these medications which have NOT CHANGED    Details   acetaminophen (TYLENOL) 500 MG tablet Take 2 tablets (1,000 mg) by mouth 3 times daily    Associated Diagnoses: Multiple closed fractures of pelvis without disruption of pelvic ring, initial encounter (H)      bisacodyl (DULCOLAX) 10 MG suppository Place 10 mg rectally daily as needed for constipation      diclofenac (VOLTAREN) 1 % topical gel Apply 2 g topically 3 times daily as needed for moderate pain      DULoxetine (CYMBALTA) 60 MG capsule Take 60 mg by mouth daily      !! gabapentin (NEURONTIN) 300 MG capsule Take 300 mg by mouth 2 times daily AM and 1345      lactobacillus rhamnosus, GG, (CULTURELL) capsule Take 1 capsule by mouth daily      levothyroxine (SYNTHROID/LEVOTHROID) 100 MCG tablet Take 1 tablet (100 mcg) by mouth daily  Qty:      Associated Diagnoses: Hypothyroidism, unspecified type      lidocaine (LIDODERM) 5 % patch Place 1 patch onto the skin every 24 hours To prevent lidocaine toxicity, patient should be patch free for 12 hrs daily.      losartan (COZAAR) 50 MG tablet Take 50 mg by mouth 2 times daily      naloxone (NARCAN) 4 MG/0.1ML nasal spray Spray 4 mg into one nostril alternating nostrils once as needed for opioid reversal every 2-3 minutes until assistance arrives      nystatin (MYCOSTATIN) 044372 UNIT/GM external cream Apply topically 2 times daily APPLY TOPICALLY TO AREA UNDER  right BREAST      !! polyethylene glycol (MIRALAX) 17 g packet Take 17 g by mouth daily as needed for constipation If pt has not had a BM during the day      !! polyethylene glycol (MIRALAX) 17 GM/Dose powder Take 17 g by mouth daily  Qty: 510 g    Comments: Hold for loose stools  Associated Diagnoses: Irritable bowel syndrome with constipation      !! polyethylene glycol-propylene glycol (SYSTANE ULTRA) 0.4-0.3 % SOLN ophthalmic solution Place 2 drops into both eyes every hour as needed for dry eyes      !! polyethylene  glycol-propylene glycol (SYSTANE ULTRA) 0.4-0.3 % SOLN ophthalmic solution Place 2 drops into both eyes 3 times daily (and additionally as needed/requested)      QUEtiapine (SEROQUEL) 25 MG tablet Take 1 tablet (25 mg) by mouth At Bedtime  Qty:      Associated Diagnoses: Insomnia due to anxiety and fear      !! senna-docusate (SENOKOT-S/PERICOLACE) 8.6-50 MG tablet Take 1 tablet by mouth daily      !! senna-docusate (SENOKOT-S/PERICOLACE) 8.6-50 MG tablet Take 1 tablet by mouth 2 times daily as needed for constipation      simethicone (MYLICON) 125 MG chewable tablet Take 125 mg by mouth 2 times daily      Simethicone 125 MG TABS Take 1 chew tab by mouth 2 times daily as needed bloating      sodium phosphate (FLEET ENEMA) 7-19 GM/118ML rectal enema Place 1 enema rectally once as needed for constipation      vitamin D3 (CHOLECALCIFEROL) 50 mcg (2000 units) tablet Take 1 tablet (50 mcg) by mouth daily  Qty:      Associated Diagnoses: Multiple closed fractures of pelvis without disruption of pelvic ring, initial encounter (H)       !! - Potential duplicate medications found. Please discuss with provider.        Allergies   Allergies   Allergen Reactions     Penicillins Anaphylaxis, Rash and Shortness Of Breath     Aspirin Nausea and GI Disturbance     Atenolol Cough     Atorvastatin Muscle Pain (Myalgia) and Nausea and Vomiting     Bupropion Nausea     Cephalexin Rash     Localized to neck area     Clindamycin Other (See Comments)     Constipation      Codeine Nausea     Ibuprofen Nausea and GI Disturbance     Lovastatin Muscle Pain (Myalgia)

## 2022-05-25 NOTE — PROGRESS NOTES
"Orthopedic Progress Note      Assessment:  Acute burst compression fracture T12       Plan:   - Continue PT/OT as able.  - Weightbearing status: WBAT with TLSO at all times while out of bed if back pain present, as able.  - Pain team following.   - Anticoagulation: SCDs, vicente stockings and ambulation.  - Discharge planning: today to [facility]. Follow up with ortho in 2-3 weeks.    Subjective:  Pain: reports that back pain is well controlled today  Nausea, Vomiting:  no  Chest pain: no  Lightheadedness, Dizziness:  no  Neuro:  Patient denies new onset numbness or paresthesias in bilateral lower extremities    No new ortho concerns.     Objective:  BP (!) 188/88 (BP Location: Left arm)   Pulse 81   Temp 97.5  F (36.4  C) (Oral)   Resp 16   Ht 1.676 m (5' 6\")   Wt 68 kg (150 lb)   SpO2 93%   BMI 24.21 kg/m    The patient is A&Ox3. Appears comfortable. Sitting up at bedside eating breakfast, appears more alert today than last couple days  Sensation is intact in bilateral lower extremities  Dorsiflexion and plantar flexion is intact in bilateral lower extremities  Calves are soft and non-tender. Negative Cricket's.    Pertinent Labs   Lab Results: personally reviewed.   Lab Results   Component Value Date    INR 1.04 12/24/2020     Lab Results   Component Value Date    WBC 5.3 05/22/2022    HGB 12.8 05/22/2022    HCT 41.5 05/22/2022    MCV 98 05/22/2022     05/22/2022     Lab Results   Component Value Date     05/22/2022    CO2 26 05/22/2022     Report completed by:  Kailyn Olmedo PA-C  [facility]        "

## 2022-05-25 NOTE — PLAN OF CARE
Problem: Plan of Care   Goal: Plan of Care Review/Shift Note  Outcome: Ongoing, Progressing   Goal Outcome Evaluation:      Denies pain. NS running @ 50mL/hr. Purewick in place. Turned and repositioned every 2 hours. Stretcher transport scheduled for 10am back to assisted living. Alarms on for safety.

## 2022-05-25 NOTE — PLAN OF CARE
Problem: Plan of Care - These are the overarching goals to be used throughout the patient stay.    Goal: Optimal Comfort and Wellbeing  Intervention: Monitor Pain and Promote Comfort  Recent Flowsheet Documentation  Taken 5/24/2022 1615 by Amanda Joel RN  Pain Management Interventions: medication (see MAR)   Goal Outcome Evaluation:      Pain well managed with oral medication and repositioning.  VSS on room air.

## 2022-05-25 NOTE — PROGRESS NOTES
Pt being transferred to [facility] via [facility] stretcher. This RN gave report to transport staff. Pt personal belongings sent with daughter. Discharge paperwork packet given to transport staff.

## 2022-05-25 NOTE — PLAN OF CARE
Occupational Therapy Discharge Summary    Reason for therapy discharge:    Discharged to [facility] with hospice    Progress towards therapy goal(s). See goals on Care Plan in [facility] electronic health record for goal details.  Goals not met.  Barriers to achieving goals:   discharge from facility.    Therapy recommendation(s):    No further therapy is recommended. Patient is on comfort care.

## 2022-05-26 ENCOUNTER — PATIENT OUTREACH (OUTPATIENT)
Dept: CARE COORDINATION | Facility: CLINIC | Age: 82
End: 2022-05-26
Payer: COMMERCIAL

## 2022-05-26 DIAGNOSIS — Z71.89 OTHER SPECIFIED COUNSELING: ICD-10-CM

## 2022-05-26 NOTE — PROGRESS NOTES
Clinic Care Coordination Contact    Background: Care Coordination referral placed from [facility] discharge report for reason of patient meeting criteria for a TCM outreach call by [facility] [place] team.    Assessment: Upon chart review, [facility] Team member will cancel/close the referral for TCM outreach due to reason below:    Patient has discharged to a Group home, Memory Care or Nursing Home. Patient receives Hospice Care as well.    Plan: Care Coordination referral for TCM outreach canceled.    SABINE Viramontes  [phone]  [facility]

## 2022-07-14 ENCOUNTER — OFFICE VISIT (OUTPATIENT)
Dept: NEUROLOGY | Facility: CLINIC | Age: 82
End: 2022-07-14
Payer: COMMERCIAL

## 2022-07-14 VITALS — HEART RATE: 86 BPM | DIASTOLIC BLOOD PRESSURE: 81 MMHG | OXYGEN SATURATION: 99 % | SYSTOLIC BLOOD PRESSURE: 136 MMHG

## 2022-07-14 DIAGNOSIS — M25.512 CHRONIC LEFT SHOULDER PAIN: ICD-10-CM

## 2022-07-14 DIAGNOSIS — G31.83 LEWY BODY DEMENTIA WITH BEHAVIORAL DISTURBANCE (H): ICD-10-CM

## 2022-07-14 DIAGNOSIS — F02.818 LEWY BODY DEMENTIA WITH BEHAVIORAL DISTURBANCE (H): ICD-10-CM

## 2022-07-14 DIAGNOSIS — K11.7 SIALORRHEA: ICD-10-CM

## 2022-07-14 DIAGNOSIS — R29.6 RECURRENT FALLS: ICD-10-CM

## 2022-07-14 DIAGNOSIS — G57.92 COMPRESSION NEUROPATHY OF LEFT LOWER EXTREMITY: ICD-10-CM

## 2022-07-14 DIAGNOSIS — G89.29 CHRONIC LEFT SHOULDER PAIN: ICD-10-CM

## 2022-07-14 DIAGNOSIS — K58.1 IRRITABLE BOWEL SYNDROME WITH CONSTIPATION: ICD-10-CM

## 2022-07-14 DIAGNOSIS — K59.01 SLOW TRANSIT CONSTIPATION: ICD-10-CM

## 2022-07-14 DIAGNOSIS — G23.2 MULTIPLE SYSTEM ATROPHY P (H): Primary | ICD-10-CM

## 2022-07-14 DIAGNOSIS — G25.9 MOVEMENT DISORDER: ICD-10-CM

## 2022-07-14 PROCEDURE — 99215 OFFICE O/P EST HI 40 MIN: CPT | Performed by: PSYCHIATRY & NEUROLOGY

## 2022-07-14 RX ORDER — CARBIDOPA AND LEVODOPA 25; 100 MG/1; MG/1
1 TABLET ORAL 3 TIMES DAILY
Qty: 270 TABLET | Refills: 3 | Status: SHIPPED | OUTPATIENT
Start: 2022-07-14 | End: 2023-01-01

## 2022-07-14 RX ORDER — POLYETHYLENE GLYCOL 3350 17 G/17G
17 POWDER, FOR SOLUTION ORAL 2 TIMES DAILY
Qty: 1020 G | Refills: 3 | Status: ON HOLD | OUTPATIENT
Start: 2022-07-14 | End: 2023-01-01

## 2022-07-14 ASSESSMENT — PAIN SCALES - GENERAL: PAINLEVEL: EXTREME PAIN (8)

## 2022-07-14 ASSESSMENT — PATIENT HEALTH QUESTIONNAIRE - PHQ9: SUM OF ALL RESPONSES TO PHQ QUESTIONS 1-9: 16

## 2022-07-14 NOTE — PROGRESS NOTES
"Department of Neurology  Movement Disorders Division     Patient: Mehreen Camara   MRN: [id number]   : 1940   Date of Visit: 2022    Dear Colleague,     Thank you for referring your patient, Ms. Camara, to the [facility] NEUROLOGY at [facility]. Please see a copy of my visit note below.    Referring Provider: Dr. Monterroso    History of Present Illness  Ms. Camara is a 82 year old female that presents to Neurology Movement clinic as a new patient for management of Multiple System Atrophy. Patient was managed by Dr. Monterroso and was seen last on 2021 where no changes were made to CD/LD at that time as her symptoms were well controlled by her primary care team.    History obtained from patient. Patient accompanied by daughters, Kelin and Eladio. No changes in regards to Multiple System Atrophy since last visit. Patient's daughter report recurrent falls with one resulting in a fractured pelvis.  Last fell a couple months ago because she didn't have glasses and fall resulted in a T12 compression fracture. She was hospitalized at [facility] for this injury. Tried to wean off of CD/LD and stiffness worsened thus is back on this medicine. She states that her left leg goes numb from knee down de la fuente she is sitting. She \"tanks\" at 4 pm every day and loses her energy and mobility worsens. She reports left shoulder pain. Has tried pain clinic but this was not helpful as the medication was too powerful. Uses lidocaine patches for back and shoulder but have minimal effect. Sees birds and cats around the house but these images do not scare her.   Denies trouble swallowing but reports an intermittent cough. She drools.     Movement Disorder-related Medications                   7 AM 1 pm 8 PM     CD/LD IR 24/100 mg 1 1 1                         Review of Systems:  Other than that mentioned above, the remainder of 12 systems reviewed were negative.    Past " Medical History:   Diagnosis Date     AAA (abdominal aortic aneurysm) (H)      Abdominal pain, epigastric 12/16/2013     Abnormal computed tomography scan 9/15/2016     Abnormal finding on imaging 9/15/2016     Acute encephalopathy 9/26/2015     Adjustment disorder with anxious mood 9/17/2018     Adjustment disorder with mixed anxiety and depressed mood 9/17/2018     Adrenal adenoma     stable, thought not to be hormonally active     Adrenal adenoma      Agitation      Anemia 11/18/2010     Aneurysm of abdominal vessel (H) 2/25/2008    Created by [employer] [facility] Annotation: Jun 8 2011  8:07Danni Chopra: 4.4 on 11/2013.   Needs 6-12 month u/s or CT.  Replacement Utility updated for latest IMO load  Overview:  2/08 Abd US: 5.4 cm. 3/08 CT Abd: 2.7 x 3.3. 9/08:  MRI Abd: 2.7X3.2  Next 9/09.     Anxiety state 2/26/2008     Arthritis      Asymptomatic postmenopausal status     Created by Conversion  Replacement Utility updated for latest IMO load     Back pain 5/16/2020     Harris esophagus      Harris's esophagus 1/11/2012    Created by [employer] [facility] Annotation: Feb  3 2012  8:32Cameron Tejada: Needs EGD 2/2017   Overview:  Overview:  Created by [employer] [facility] Annotation: Feb  3 2012  8:32Cameron Tejada: Needs EGD 2/2017     Benign neoplasm of ascending colon 9/19/2016     Bilateral knee pain 8/5/2020     Bloating 5/5/2017     Bradycardia 9/17/2018     Cataract 9/17/2018    Created by Conversion   Overview:  Overview:  Created by Conversion     Chest wall pain 1/20/2013     Chronic low back pain 9/29/2016     Cognitive and behavioral changes      Compression fracture of lumbar vertebra (H) 10/10/2020     Constipation      Contact dermatitis and eczema 11/22/2006     DDD (degenerative disc disease), lumbosacral 2/26/2008     Depression      Depression, major 7/13/2009     Depressive disorder      Diaphragmatic hernia 9/18/2018    Created by Conversion  Replacement Utility  updated for latest IMO load  Overview:  Overview:  Created by Conversion  Replacement Utility updated for latest IMO load     Dietary counseling and surveillance 9/15/2016     Disorder of bone and cartilage 9/17/2018    Created by Conversion [employer] [facility] Annotation: Dec 13 2012  7:Roberta Padron: vit D/Ca, f/u  Dexa needed 1/2014.  Replacement Utility updated for latest IMO load  Overview:  Overview:  Created by Conversion [employer] [facility] Annotation: Dec 13 2012  7:Roberta Padron: vit D/Ca, f/u  Dexa needed 1/2014.  Replacement Utility updated for latest IMO load     Diverticulosis of colon 12/18/2014     Dizziness and giddiness     Created by Conversion      Dysphagia 12/17/2013     Early satiety 12/16/2013     Esophageal reflux     Created by Conversion      Essential hypertension 11/22/2006    Created by Conversion  Replacement Utility updated for latest IMO load  Overview:  Overview:  Created by Conversion  Replacement Utility updated for latest IMO load     Fall 8/5/2020     Flatulence, eructation and gas pain 2/19/2014     Gaseous abdominal distention 9/19/2016     Gastro-esophageal reflux disease with esophagitis 6/6/2017     Generalized muscle weakness 7/4/2018     GERD (gastroesophageal reflux disease)      Hemorrhage of rectum and anus 9/19/2016     Hiatal hernia      HLD (hyperlipidemia)      Hoarseness of voice 4/6/2010     HTN (hypertension)      Hypertension      Hypokalemia 6/29/2007     Hypothyroid      Hypothyroidism 9/22/2009    Created by Conversion  Replacement Utility updated for latest IMO load  Overview:  Overview:  Created by Conversion  Replacement Utility updated for latest IMO load     Insomnia due to anxiety and fear 9/17/2018     Insomnia due to mental condition      Irritable bowel syndrome 11/22/2006     Lower back pain      Major depressive disorder, recurrent episode (H) 9/17/2018    Created by Conversion  Replacement Utility updated for latest IMO load  Overview:   Overview:  Created by Conversion  Replacement Utility updated for latest IMO load     Major depressive disorder, single episode, moderate (H)      Malaise and fatigue 7/10/2007     Metabolic encephalopathy      Migraine with aura      Mixed hyperlipidemia 2/10/2008    Created by Conversion      Mood disorder due to a general medical condition      Movement disorder 9/19/2018     Multiple system atrophy P (H)      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     Murmur      Nonrheumatic aortic valve stenosis 10/23/2020     Nontoxic multinodular goiter 11/22/2006    Overview:  thyroidectomy 7/07 for atypical cells on FNA- benign cysts seen at time of surgical pathology     Obesity, unspecified     Created by Conversion      Orthostatic hypotension dysautonomic syndrome 9/22/2017     Osteoarthritis      Partial small bowel obstruction (H)      Polyp of colon 9/19/2016     Post-op pain 12/12/2018     Postoperative hypothyroidism 1/15/2013    Overview:  7/2/07-  Thyroidectomy by Dr Wang Crenshaw     Prediabetes 10/26/2010     Primary insomnia 7/4/2017     REM sleep behavior disorder 8/5/2020     Retinal migraine 4/28/2014     Rheumatic fever     thought to have had as a child     Rupture of left Achilles tendon, sequela 12/31/2019     S/P AAA repair using bifurcation graft 11/30/2015     S/P laparoscopic cholecystectomy 12/12/2018     Scoliosis      Sleep difficulties      Small bowel obstruction (H)      Thyroid disease      Thyroid nodule     removed nodules and thyroid     Tinnitus     Created by Conversion  Replacement Utility updated for latest IMO load     Undiagnosed cardiac murmurs 11/22/2006     Vitamin D deficiency 1/15/2013     Weak 4/29/2018     Weakness 5/4/2017     Weakness of both lower extremities 7/5/2018     Past Surgical History:   Procedure Laterality Date     AAA REPAIR  2015    [facility] bifurcation graft     CARPAL TUNNEL RELEASE RT/LT Bilateral 2013     HC REVISE MEDIAN N/CARPAL TUNNEL SURG      Description:  Neuroplasty Decompression Median Nerve At Carpal Tunnel;  Recorded: 2013;  Comments: bilateral     HYSTERECTOMY       IR ABDOMINAL ENDOVASCULAR STENT GRAFT  2015     JOINT REPLACEMENT Right 2003     JOINT REPLACEMENT       LAPAROSCOPIC CHOLECYSTECTOMY N/A 2018    Procedure: LAPAROSCOPIC CHOLECYSTECTOMY;  Surgeon: Amadeo Sebastian MD;  Location: [facility] OR     ORTHOPEDIC SURGERY       ID THYROIDECTOMY      Description: Thyroid Surgery Total Thyroidectomy;  Recorded: 2011;     SPINE SURGERY  1981    cervical spine fusion     THYROIDECTOMY       XR MAJOR JOINT OR BURSA INJ/ASP BILATERAL  2020     XR MAJOR JOINT OR BURSA INJ/ASP UNILATERAL  2020     ZZC CERV SPINE FUSN,ANTER,BELOW C2      Description: Cervical Vertebral Fusion;  Recorded: 2013;  Comments:      ZZC TOTAL ABDOM HYSTERECTOMY      Description: Hysterectomy;  Recorded: 2013;     ZZC TOTAL HIP ARTHROPLASTY Right     Description: Total Hip Replacement;  Recorded: 2013;  Comments: right,      Family History   Problem Relation Age of Onset     Hypertension Mother      Coronary Artery Disease Mother      Coronary Artery Disease Father      Other Cancer Brother      Parkinsonism Other      Heart Disease Mother      Heart Disease Father      Cancer Brother 57.00        colon     Hypertension Daughter      Hypertension Daughter      Neuropathy Daughter      Lupus Daughter      Heart Disease Paternal Aunt      Heart Disease Paternal Uncle      Parkinsonism Paternal Uncle      Social History     Socioeconomic History     Marital status:      Spouse name: Not on file     Number of children: Not on file     Years of education: Not on file     Highest education level: Not on file   Occupational History     Not on file   Tobacco Use     Smoking status: Former Smoker     Packs/day: 0.50     Types: Cigarettes     Quit date: 1994     Years since quittin.6     Smokeless tobacco: Never Used    Substance and Sexual Activity     Alcohol use: No     Drug use: No     Sexual activity: Not Currently   Other Topics Concern     Parent/sibling w/ CABG, MI or angioplasty before 65F 55M? Not Asked   Social History Narrative     Not on file     Social Determinants of Health     Financial Resource Strain: Not on file   Food Insecurity: Not on file   Transportation Needs: Not on file   Physical Activity: Not on file   Stress: Not on file   Social Connections: Not on file   Intimate Partner Violence: Not on file   Housing Stability: Not on file         Medications:  Current Outpatient Medications   Medication Sig Dispense Refill     acetaminophen (TYLENOL) 500 MG tablet Take 2 tablets (1,000 mg) by mouth 3 times daily       bisacodyl (DULCOLAX) 10 MG suppository Place 10 mg rectally daily as needed for constipation       carbidopa-levodopa (SINEMET)  MG tablet Take 1 tablet by mouth 3 times daily 270 tablet 3     diclofenac (VOLTAREN) 1 % topical gel Apply 2 g topically 3 times daily as needed for moderate pain       DULoxetine (CYMBALTA) 60 MG capsule Take 60 mg by mouth daily       gabapentin (NEURONTIN) 300 MG capsule Take 1 capsule (300 mg) by mouth At Bedtime 30 capsule 0     gabapentin (NEURONTIN) 300 MG capsule Take 300 mg by mouth 2 times daily AM and 1345       hydrALAZINE (APRESOLINE) 25 MG tablet Take 1 tablet (25 mg) by mouth every 8 hours 30 tablet 0     lactobacillus rhamnosus (GG) (CULTURELL) capsule Take 1 capsule by mouth daily       levothyroxine (SYNTHROID/LEVOTHROID) 100 MCG tablet Take 1 tablet (100 mcg) by mouth daily       lidocaine (LIDODERM) 5 % patch Place 1 patch onto the skin every 24 hours To prevent lidocaine toxicity, patient should be patch free for 12 hrs daily.       losartan (COZAAR) 50 MG tablet Take 50 mg by mouth 2 times daily       naloxone (NARCAN) 4 MG/0.1ML nasal spray Spray 4 mg into one nostril alternating nostrils once as needed for opioid reversal every 2-3 minutes  until assistance arrives       nystatin (MYCOSTATIN) 719905 UNIT/GM external cream Apply topically 2 times daily APPLY TOPICALLY TO AREA UNDER  right BREAST       polyethylene glycol (MIRALAX) 17 g packet Take 17 g by mouth daily as needed for constipation If pt has not had a BM during the day       polyethylene glycol (MIRALAX) 17 GM/Dose powder Take 17 g by mouth 2 times daily 1020 g 3     polyethylene glycol-propylene glycol (SYSTANE ULTRA) 0.4-0.3 % SOLN ophthalmic solution Place 2 drops into both eyes every hour as needed for dry eyes       polyethylene glycol-propylene glycol (SYSTANE ULTRA) 0.4-0.3 % SOLN ophthalmic solution Place 2 drops into both eyes 3 times daily (and additionally as needed/requested)       QUEtiapine (SEROQUEL) 25 MG tablet Take 1 tablet (25 mg) by mouth At Bedtime       senna-docusate (SENOKOT-S/PERICOLACE) 8.6-50 MG tablet Take 1 tablet by mouth daily       senna-docusate (SENOKOT-S/PERICOLACE) 8.6-50 MG tablet Take 1 tablet by mouth 2 times daily as needed for constipation       simethicone (MYLICON) 125 MG chewable tablet Take 125 mg by mouth 2 times daily       Simethicone 125 MG TABS Take 1 chew tab by mouth 2 times daily as needed bloating       sodium phosphate (FLEET ENEMA) 7-19 GM/118ML rectal enema Place 1 enema rectally once as needed for constipation       vitamin D3 (CHOLECALCIFEROL) 50 mcg (2000 units) tablet Take 1 tablet (50 mcg) by mouth daily       Buprenorphine HCl (BELBUCA) 75 MCG FILM buccal film Place 1 Film (75 mcg) inside cheek every 12 hours 10 Film 0           Allergies   Allergen Reactions     Penicillins Anaphylaxis, Rash and Shortness Of Breath     Aspirin Nausea and GI Disturbance     Atenolol Cough     Atorvastatin Muscle Pain (Myalgia) and Nausea and Vomiting     Bupropion Nausea     Cephalexin Rash     Localized to neck area     Clindamycin Other (See Comments)     Constipation      Codeine Nausea     Ibuprofen Nausea and GI Disturbance     Lovastatin  Muscle Pain (Myalgia)         Physical Exam:  The patient's  blood pressure is 136/81 and her pulse is 86. Her oxygen saturation is 99%.      Physical Exam:  GENERAL: alert, active, attentive, appropriately groomed   HEENT: normocephalic, eyes open with no discharge, nares patent, oropharynx clear-no lesions  CHEST: non labored breathing  EXTREMITIES: no edema/cyanosis in BUE/BLE  PSYCH: mood stable     Neurologic Exam:  MENTAL STATUS: Alert and oriented to person, place, time, and situation. Follows commands. Recent and remote memory intact. Attention span and concentration intact.   SPEECH: Fluent, intact comprehension and articulation, hypophonic   CN: visual fields intact, EOMIB, facial sensation intact, facial movement symmetric, hearing grossly intact to conversation   MOTOR: Moves all extremities equally against gravity without difficulty  COORDINATION: no dysmetria with FTN  The patient is sitting in her wheelchair and is alert and quite conversant.  Currently there is no signs of delusions or hallucinations.    Impression:  Mehreen Camara is a 82 year old female with Multiple System Atrophy.  She remains stable on carbidopa/levodopa but wishes for this medicine to be given on time at her facility; a letter was provided to support this. We discussed several other issues, detailed below.     1.  Multiple system atrophy  2.  Cognitive decline likely secondary to her MSA reflecting overlap Lewy body dementia  3.  Constipation  4.  Sialorrhea  5.  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  Recurrent falls    Plan:   - Continue carbidopa/levodopa, 25/100, 1 tablet 3 times a day.  - Letter provided to facility to take meds on time, especially carbidopa/levodopa.  - For constipation, continue Senokot. Increase Miralax to BID. Goal is to have a BM daily or every other day. Also, try warm prune juice daily or bid.   - For information on MSA, please go to [email or web address]  - Referral for Ortho for evaluation and  management of left shoulder pain and you will consider referral to Pain Management   - For drooling, try using candies or lozenges to remind yourself to swallow your saliva. If drooling worsens, we can consider treatment with Botox injections.   - Avoid compression of lower lateral leg to improve numbness of leg. If this doesn't work, we can investigate further with an EMG/NCS (needle and shock test).     Patient to return in 8 months, for in-person visit, 30 minutes.     Chasidy Lugo DO, MA   of Neurology   [facility]     53 minutes spent on date of encounter doing chart reviews and exam and documentation and further activities as noted above.

## 2022-07-14 NOTE — PATIENT INSTRUCTIONS
Plan:   - Continue carbidopa/levodopa, 25/100, 1 tablet 3 times a day.  - Letter provided to facility to take meds on time, especially CD/LD  - For constipation, continue Senokot. Increase Miralax to BID. Goal is to have a BM daily or every other day. Also, try warm prune juice daily or bid.   - For information on MSA, please go to [email or web address]  - Referral for Ortho for evaluation and management of left shoulder pain and you will consider referral to Pain Management   - For drooling, try using candies or lozenges to remind yourself to swallow your saliva. If drooling worsens, we can consider treatment with Botox injections.   - Avoid compression of lower lateral leg to improve numbness of leg. If this doesn't work, we can investigate further with an EMG/NCS (needle and shock test).     Patient to return in 8 months, for in-person visit, 30 minutes.     At your visit today, we discussed your risk for falls and preventive options.    Fall Prevention  Falls often occur due to slipping, tripping or losing your balance. Millions of people fall every year and injure themselves. Here are ways to reduce your risk of falling again.   Think about your fall, was there anything that caused your fall that can be fixed, removed, or replaced?  Make your home safe by keeping walkways clear of objects you may trip over, such as electric cords.  Use non-slip pads under rugs. Don't use area rugs or small throw rugs.  Use non-slip mats in bathtubs and showers.  Install handrails and lights on staircases. The handrails should be on both sides of the stairs.  Don't walk in poorly lit areas.  Don't stand on chairs or wobbly ladders.  Use caution when reaching overhead or looking upward. This position can cause a loss of balance.  Be sure your shoes fit properly, have non-slip bottoms and are in good condition.   Wear shoes both inside and out. Don't go barefoot or wear slippers.  Be cautious when going up and down stairs, curbs, and when  walking on uneven sidewalks.  If your balance is poor, consider using a cane or walker.  If your fall was related to alcohol use, stop or limit alcohol intake.   If your fall was related to use of sleeping medicines, talk to your healthcare provider about this. You may need to reduce your dosage at bedtime if you awaken during the night to go to the bathroom.    To reduce the need for nighttime bathroom trips:  Don't drink fluids for several hours before going to bed  Empty your bladder before going to bed  Men can keep a urinal at the bedside  Stay as active as you can. Balance, flexibility, strength, and endurance all come from exercise. They all play a role in preventing falls. Ask your healthcare provider which types of activity are right for you.  Get your vision checked on a regular basis.  If you have pets, know where they are before you stand up or walk so you don't trip over them.  Use night lights.  Go over all your medicines with a pharmacist or other healthcare provider to see if any of them could make you more likely to fall.  [employer] last reviewed this educational content on 4/1/2018 2000-2021 [employer]. All rights reserved. This information is not intended as a substitute for professional medical care. Always follow your healthcare professional's instructions.

## 2022-07-14 NOTE — LETTER
"            [facility] NEUROLOGY [facility]INIC [place] [id number] [facility]  [address] FLO[address]  [facility] [address]  Phone: [phone]  Fax: [phone]    07/14/22    Mehreen I[address]RA [address]  [facility] [address]      To whom it may concern:     Parkinson's disease is a progressive, incurable, neurodegenerative disease that causes many of the following symptoms in patients:  tremor, slowness, stiffness, balance/gait problems, apathy, depression, anxiety, urinary urgency/frequency, constipation, sleep behavior disorders, and cognitive impairment. Carbidopa/levodopa (Sinemet) acts to replace dopamine in the brain that is no longer there due to the disease process. Patients require this medication in order to function daily and have any semblance of quality of life.     #1 Point:    People with Parkinson's disease (PD) need their medications on time, every time. Getting them too soon or too late can cause problems and result in dire consequences for the patient and the facility. Staff may need to be educated on the importance of delivering medications at the right dose and at the right times - times that may differ from the usual times that medications are dispensed in the facility.     Research has found that three out of four people with Parkinson's do not receive medications on time when staying in facilities, and that people with PD in a facility have a 1 in 4 chance of complications because of medication errors. With more frequent hospital visits and a high sensitivity to the timing and dosing of PD medications, people with Parkinson's face great risks when living in facilities.    #2 Point:    Most meals contain a large amount of protein, and the amino acids use up all the \"carriers\" that transport the Sinemet across the blood-brain barrier. Sinemet must wait until the carriers are free again.  These amino acids clog all the \"carriers\" and Sinemet can't get through to the brain. Therefore, " it's best to take Sinemet 30 to 60 minutes before eating a meal, or 1-2 hours after a meal. This allows Sinemet to be quickly absorbed before the food can interfere.    On behalf of the Movement Disorder providers at the [facility], please make a concerted effort to deliver carbidopa/levodopa (Sinemet) on time and 30-60 minutes before meals or 1-2 hours after a meal. Please feel free to contact one of our Parkinson's specialist nurses with questions or concerns regarding any aspects of Parkinson's disease. We are happy to partner with you in caring for these patients.    Retrieved from [email or web address]    Sincerely,      [facility]  [address] [place] [address] [id number]  [place] [address]  [phone]      Sincerely,      Chasidy Lugo DO

## 2022-07-14 NOTE — LETTER
"2022       RE: Mehreen Camara  [address]  [facility] [address]     Dear Colleague,    Thank you for referring your patient, Mehreen Camara, to the [facility] NEUROLOGY [facility] [place] at [facility]. Please see a copy of my visit note below.    Department of Neurology  Movement Disorders Division     Patient: Mehreen Camara   MRN: [id number]   : 1940   Date of Visit: 2022    Dear Colleague,     Thank you for referring your patient, Ms. Camara, to the [facility] NEUROLOGY at [facility]. Please see a copy of my visit note below.    Referring Provider: Dr. Monterroso    History of Present Illness  Ms. Camara is a 82 year old female that presents to Neurology Movement clinic as a new patient for management of Multiple System Atrophy. Patient was managed by Dr. Monterroso and was seen last on 2021 where no changes were made to CD/LD at that time as her symptoms were well controlled by her primary care team.    History obtained from patient. Patient accompanied by daughters, Kelin and Eladio. No changes in regards to Multiple System Atrophy since last visit. Patient's daughter report recurrent falls with one resulting in a fractured pelvis.  Last fell a couple months ago because she didn't have glasses and fall resulted in a T12 compression fracture. She was hospitalized at [facility] for this injury. Tried to wean off of CD/LD and stiffness worsened thus is back on this medicine. She states that her left leg goes numb from knee down de la fuente she is sitting. She \"tanks\" at 4 pm every day and loses her energy and mobility worsens. She reports left shoulder pain. Has tried pain clinic but this was not helpful as the medication was too powerful. Uses lidocaine patches for back and shoulder but have minimal effect. Sees birds and cats around the house but these images do not scare her. "   Denies trouble swallowing but reports an intermittent cough. She drools.     Movement Disorder-related Medications                   7 AM 1 pm 8 PM     CD/LD IR 24/100 mg 1 1 1                         Review of Systems:  Other than that mentioned above, the remainder of 12 systems reviewed were negative.    Past Medical History:   Diagnosis Date     AAA (abdominal aortic aneurysm) (H)      Abdominal pain, epigastric 12/16/2013     Abnormal computed tomography scan 9/15/2016     Abnormal finding on imaging 9/15/2016     Acute encephalopathy 9/26/2015     Adjustment disorder with anxious mood 9/17/2018     Adjustment disorder with mixed anxiety and depressed mood 9/17/2018     Adrenal adenoma     stable, thought not to be hormonally active     Adrenal adenoma      Agitation      Anemia 11/18/2010     Aneurysm of abdominal vessel (H) 2/25/2008    Created by [facility] Annotation: Jun 8 2011  8:07AM - Danni Ortiz: 4.4 on 11/2013.   Needs 6-12 month u/s or CT.  Replacement Utility updated for latest IMO load  Overview:  2/08 Abd US: 5.4 cm. 3/08 CT Abd: 2.7 x 3.3. 9/08:  MRI Abd: 2.7X3.2  Next 9/09.     Anxiety state 2/26/2008     Arthritis      Asymptomatic postmenopausal status     Created by Conversion  Replacement Utility updated for latest IMO load     Back pain 5/16/2020     Harris esophagus      Harris's esophagus 1/11/2012    Created by [facility] Annotation: Feb  3 2012  8:32Cameron Tejada: Needs EGD 2/2017   Overview:  Overview:  Created by [facility] Annotation: Feb  3 2012  8:32Cameron Tejada: Needs EGD 2/2017     Benign neoplasm of ascending colon 9/19/2016     Bilateral knee pain 8/5/2020     Bloating 5/5/2017     Bradycardia 9/17/2018     Cataract 9/17/2018    Created by Conversion   Overview:  Overview:  Created by Conversion     Chest wall pain 1/20/2013     Chronic low back pain 9/29/2016     Cognitive and behavioral changes      Compression fracture  of lumbar vertebra (H) 10/10/2020     Constipation      Contact dermatitis and eczema 11/22/2006     DDD (degenerative disc disease), lumbosacral 2/26/2008     Depression      Depression, major 7/13/2009     Depressive disorder      Diaphragmatic hernia 9/18/2018    Created by Conversion  Replacement Utility updated for latest IMO load  Overview:  Overview:  Created by Conversion  Replacement Utility updated for latest IMO load     Dietary counseling and surveillance 9/15/2016     Disorder of bone and cartilage 9/17/2018    Created by Conversion [facility] Annotation: Dec 13 2012  7:Roberta Padron: vit D/Ca, f/u  Dexa needed 1/2014.  Replacement Utility updated for latest IMO load  Overview:  Overview:  Created by Conversion [facility] [facility] Annotation: Dec 13 2012  7:Roberta Padron: vit D/Ca, f/u  Dexa needed 1/2014.  Replacement Utility updated for latest IMO load     Diverticulosis of colon 12/18/2014     Dizziness and giddiness     Created by Conversion      Dysphagia 12/17/2013     Early satiety 12/16/2013     Esophageal reflux     Created by Conversion      Essential hypertension 11/22/2006    Created by Conversion  Replacement Utility updated for latest IMO load  Overview:  Overview:  Created by Conversion  Replacement Utility updated for latest IMO load     Fall 8/5/2020     Flatulence, eructation and gas pain 2/19/2014     Gaseous abdominal distention 9/19/2016     Gastro-esophageal reflux disease with esophagitis 6/6/2017     Generalized muscle weakness 7/4/2018     GERD (gastroesophageal reflux disease)      Hemorrhage of rectum and anus 9/19/2016     Hiatal hernia      HLD (hyperlipidemia)      Hoarseness of voice 4/6/2010     HTN (hypertension)      Hypertension      Hypokalemia 6/29/2007     Hypothyroid      Hypothyroidism 9/22/2009    Created by Conversion  Replacement Utility updated for latest IMO load  Overview:  Overview:  Created by Conversion  Replacement Utility updated for latest  IMO load     Insomnia due to anxiety and fear 9/17/2018     Insomnia due to mental condition      Irritable bowel syndrome 11/22/2006     Lower back pain      Major depressive disorder, recurrent episode (H) 9/17/2018    Created by Conversion  Replacement Utility updated for latest IMO load  Overview:  Overview:  Created by Conversion  Replacement Utility updated for latest IMO load     Major depressive disorder, single episode, moderate (H)      Malaise and fatigue 7/10/2007     Metabolic encephalopathy      Migraine with aura      Mixed hyperlipidemia 2/10/2008    Created by Conversion      Mood disorder due to a general medical condition      Movement disorder 9/19/2018     Multiple system atrophy P (H)      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     Murmur      Nonrheumatic aortic valve stenosis 10/23/2020     Nontoxic multinodular goiter 11/22/2006    Overview:  thyroidectomy 7/07 for atypical cells on FNA- benign cysts seen at time of surgical pathology     Obesity, unspecified     Created by Conversion      Orthostatic hypotension dysautonomic syndrome 9/22/2017     Osteoarthritis      Partial small bowel obstruction (H)      Polyp of colon 9/19/2016     Post-op pain 12/12/2018     Postoperative hypothyroidism 1/15/2013    Overview:  7/2/07-  Thyroidectomy by Dr Wang Crenshaw     Prediabetes 10/26/2010     Primary insomnia 7/4/2017     REM sleep behavior disorder 8/5/2020     Retinal migraine 4/28/2014     Rheumatic fever     thought to have had as a child     Rupture of left Achilles tendon, sequela 12/31/2019     S/P AAA repair using bifurcation graft 11/30/2015     S/P laparoscopic cholecystectomy 12/12/2018     Scoliosis      Sleep difficulties      Small bowel obstruction (H)      Thyroid disease      Thyroid nodule     removed nodules and thyroid     Tinnitus     Created by Conversion  Replacement Utility updated for latest IMO load     Undiagnosed cardiac murmurs 11/22/2006     Vitamin D deficiency  1/15/2013     Weak 4/29/2018     Weakness 5/4/2017     Weakness of both lower extremities 7/5/2018     Past Surgical History:   Procedure Laterality Date     AAA REPAIR  2015    wtih bifurcation graft     CARPAL TUNNEL RELEASE RT/LT Bilateral 2013     HC REVISE MEDIAN N/CARPAL TUNNEL SURG      Description: Neuroplasty Decompression Median Nerve At Carpal Tunnel;  Recorded: 01/21/2013;  Comments: bilateral     HYSTERECTOMY  2013     IR ABDOMINAL ENDOVASCULAR STENT GRAFT  11/30/2015     JOINT REPLACEMENT Right 2003     JOINT REPLACEMENT       LAPAROSCOPIC CHOLECYSTECTOMY N/A 12/12/2018    Procedure: LAPAROSCOPIC CHOLECYSTECTOMY;  Surgeon: Amadeo Sebastian MD;  Location: [facility] OR     ORTHOPEDIC SURGERY       AR THYROIDECTOMY      Description: Thyroid Surgery Total Thyroidectomy;  Recorded: 06/08/2011;     SPINE SURGERY  1981    cervical spine fusion     THYROIDECTOMY  2011     XR MAJOR JOINT OR BURSA INJ/ASP BILATERAL  5/18/2020     XR MAJOR JOINT OR BURSA INJ/ASP UNILATERAL  5/18/2020     ZZC CERV SPINE FUSN,ANTER,BELOW C2      Description: Cervical Vertebral Fusion;  Recorded: 01/21/2013;  Comments: 1981     ZZC TOTAL ABDOM HYSTERECTOMY  1985    Description: Hysterectomy;  Recorded: 01/21/2013;     ZZC TOTAL HIP ARTHROPLASTY Right     Description: Total Hip Replacement;  Recorded: 01/21/2013;  Comments: right, 2003     Family History   Problem Relation Age of Onset     Hypertension Mother      Coronary Artery Disease Mother      Coronary Artery Disease Father      Other Cancer Brother      Parkinsonism Other      Heart Disease Mother      Heart Disease Father      Cancer Brother 57.00        colon     Hypertension Daughter      Hypertension Daughter      Neuropathy Daughter      Lupus Daughter      Heart Disease Paternal Aunt      Heart Disease Paternal Uncle      Parkinsonism Paternal Uncle      Social History     Socioeconomic History     Marital status:      Spouse name: Not on file     Number of children: Not  on file     Years of education: Not on file     Highest education level: Not on file   Occupational History     Not on file   Tobacco Use     Smoking status: Former Smoker     Packs/day: 0.50     Types: Cigarettes     Quit date: 1994     Years since quittin.6     Smokeless tobacco: Never Used   Substance and Sexual Activity     Alcohol use: No     Drug use: No     Sexual activity: Not Currently   Other Topics Concern     Parent/sibling w/ CABG, MI or angioplasty before 65F 55M? Not Asked   Social History Narrative     Not on file     Social Determinants of Health     Financial Resource Strain: Not on file   Food Insecurity: Not on file   Transportation Needs: Not on file   Physical Activity: Not on file   Stress: Not on file   Social Connections: Not on file   Intimate Partner Violence: Not on file   Housing Stability: Not on file         Medications:  Current Outpatient Medications   Medication Sig Dispense Refill     acetaminophen (TYLENOL) 500 MG tablet Take 2 tablets (1,000 mg) by mouth 3 times daily       bisacodyl (DULCOLAX) 10 MG suppository Place 10 mg rectally daily as needed for constipation       carbidopa-levodopa (SINEMET)  MG tablet Take 1 tablet by mouth 3 times daily 270 tablet 3     diclofenac (VOLTAREN) 1 % topical gel Apply 2 g topically 3 times daily as needed for moderate pain       DULoxetine (CYMBALTA) 60 MG capsule Take 60 mg by mouth daily       gabapentin (NEURONTIN) 300 MG capsule Take 1 capsule (300 mg) by mouth At Bedtime 30 capsule 0     gabapentin (NEURONTIN) 300 MG capsule Take 300 mg by mouth 2 times daily AM and 1345       hydrALAZINE (APRESOLINE) 25 MG tablet Take 1 tablet (25 mg) by mouth every 8 hours 30 tablet 0     lactobacillus rhamnosus (GG) (CULTURELL) capsule Take 1 capsule by mouth daily       levothyroxine (SYNTHROID/LEVOTHROID) 100 MCG tablet Take 1 tablet (100 mcg) by mouth daily       lidocaine (LIDODERM) 5 % patch Place 1 patch onto the skin every 24  hours To prevent lidocaine toxicity, patient should be patch free for 12 hrs daily.       losartan (COZAAR) 50 MG tablet Take 50 mg by mouth 2 times daily       naloxone (NARCAN) 4 MG/0.1ML nasal spray Spray 4 mg into one nostril alternating nostrils once as needed for opioid reversal every 2-3 minutes until assistance arrives       nystatin (MYCOSTATIN) 147233 UNIT/GM external cream Apply topically 2 times daily APPLY TOPICALLY TO AREA UNDER  right BREAST       polyethylene glycol (MIRALAX) 17 g packet Take 17 g by mouth daily as needed for constipation If pt has not had a BM during the day       polyethylene glycol (MIRALAX) 17 GM/Dose powder Take 17 g by mouth 2 times daily 1020 g 3     polyethylene glycol-propylene glycol (SYSTANE ULTRA) 0.4-0.3 % SOLN ophthalmic solution Place 2 drops into both eyes every hour as needed for dry eyes       polyethylene glycol-propylene glycol (SYSTANE ULTRA) 0.4-0.3 % SOLN ophthalmic solution Place 2 drops into both eyes 3 times daily (and additionally as needed/requested)       QUEtiapine (SEROQUEL) 25 MG tablet Take 1 tablet (25 mg) by mouth At Bedtime       senna-docusate (SENOKOT-S/PERICOLACE) 8.6-50 MG tablet Take 1 tablet by mouth daily       senna-docusate (SENOKOT-S/PERICOLACE) 8.6-50 MG tablet Take 1 tablet by mouth 2 times daily as needed for constipation       simethicone (MYLICON) 125 MG chewable tablet Take 125 mg by mouth 2 times daily       Simethicone 125 MG TABS Take 1 chew tab by mouth 2 times daily as needed bloating       sodium phosphate (FLEET ENEMA) 7-19 GM/118ML rectal enema Place 1 enema rectally once as needed for constipation       vitamin D3 (CHOLECALCIFEROL) 50 mcg (2000 units) tablet Take 1 tablet (50 mcg) by mouth daily       Buprenorphine HCl (BELBUCA) 75 MCG FILM buccal film Place 1 Film (75 mcg) inside cheek every 12 hours 10 Film 0           Allergies   Allergen Reactions     Penicillins Anaphylaxis, Rash and Shortness Of Breath     Aspirin  Nausea and GI Disturbance     Atenolol Cough     Atorvastatin Muscle Pain (Myalgia) and Nausea and Vomiting     Bupropion Nausea     Cephalexin Rash     Localized to neck area     Clindamycin Other (See Comments)     Constipation      Codeine Nausea     Ibuprofen Nausea and GI Disturbance     Lovastatin Muscle Pain (Myalgia)         Physical Exam:  The patient's  blood pressure is 136/81 and her pulse is 86. Her oxygen saturation is 99%.      Physical Exam:  GENERAL: alert, active, attentive, appropriately groomed   HEENT: normocephalic, eyes open with no discharge, nares patent, oropharynx clear-no lesions  CHEST: non labored breathing  EXTREMITIES: no edema/cyanosis in BUE/BLE  PSYCH: mood stable     Neurologic Exam:  MENTAL STATUS: Alert and oriented to person, place, time, and situation. Follows commands. Recent and remote memory intact. Attention span and concentration intact.   SPEECH: Fluent, intact comprehension and articulation, hypophonic   CN: visual fields intact, EOMIB, facial sensation intact, facial movement symmetric, hearing grossly intact to conversation   MOTOR: Moves all extremities equally against gravity without difficulty  COORDINATION: no dysmetria with FTN  The patient is sitting in her wheelchair and is alert and quite conversant.  Currently there is no signs of delusions or hallucinations.    Impression:  Mehreen Camara is a 82 year old female with Multiple System Atrophy.  She remains stable on carbidopa/levodopa but wishes for this medicine to be given on time at her facility; a letter was provided to support this. We discussed several other issues, detailed below.     1.  Multiple system atrophy  2.  Cognitive decline likely secondary to her MSA reflecting overlap Lewy body dementia  3.  Constipation  4.  Sialorrhea  5.  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  Recurrent falls    Plan:   - Continue carbidopa/levodopa, 25/100, 1 tablet 3 times a day.  - Letter provided to  facility to take meds on time, especially carbidopa/levodopa.  - For constipation, continue Senokot. Increase Miralax to BID. Goal is to have a BM daily or every other day. Also, try warm prune juice daily or bid.   - For information on MSA, please go to [email or web address]  - Referral for Ortho for evaluation and management of left shoulder pain and you will consider referral to Pain Management   - For drooling, try using candies or lozenges to remind yourself to swallow your saliva. If drooling worsens, we can consider treatment with Botox injections.   - Avoid compression of lower lateral leg to improve numbness of leg. If this doesn't work, we can investigate further with an EMG/NCS (needle and shock test).     Patient to return in 8 months, for in-person visit, 30 minutes.       Chasidy Lugo DO MA   of Neurology   [facility]     53 minutes spent on date of encounter doing chart reviews and exam and documentation and further activities as noted above.

## 2022-11-07 ENCOUNTER — HOSPITAL ENCOUNTER (OUTPATIENT)
Facility: CLINIC | Age: 82
Setting detail: OBSERVATION
Discharge: NURSING, REHAB OR LONG-TERM CARE | End: 2022-11-09
Attending: EMERGENCY MEDICINE | Admitting: HOSPITALIST
Payer: COMMERCIAL

## 2022-11-07 ENCOUNTER — APPOINTMENT (OUTPATIENT)
Dept: CT IMAGING | Facility: CLINIC | Age: 82
End: 2022-11-07
Attending: EMERGENCY MEDICINE
Payer: COMMERCIAL

## 2022-11-07 DIAGNOSIS — R10.9 ACUTE ABDOMINAL PAIN: ICD-10-CM

## 2022-11-07 DIAGNOSIS — R07.9 ACUTE CHEST PAIN: ICD-10-CM

## 2022-11-07 DIAGNOSIS — M54.50 ACUTE LOW BACK PAIN, UNSPECIFIED BACK PAIN LATERALITY, UNSPECIFIED WHETHER SCIATICA PRESENT: ICD-10-CM

## 2022-11-07 DIAGNOSIS — M79.604 BILATERAL LEG PAIN: ICD-10-CM

## 2022-11-07 DIAGNOSIS — S22.081A CLOSED STABLE BURST FRACTURE OF TWELFTH THORACIC VERTEBRA, INITIAL ENCOUNTER (H): Primary | ICD-10-CM

## 2022-11-07 DIAGNOSIS — M79.605 BILATERAL LEG PAIN: ICD-10-CM

## 2022-11-07 LAB
ALBUMIN SERPL-MCNC: 4.3 G/DL (ref 3.5–5)
ALP SERPL-CCNC: 60 U/L (ref 45–120)
ALT SERPL W P-5'-P-CCNC: <9 U/L (ref 0–45)
ANION GAP SERPL CALCULATED.3IONS-SCNC: 10 MMOL/L (ref 5–18)
AST SERPL W P-5'-P-CCNC: 13 U/L (ref 0–40)
ATRIAL RATE - MUSE: 79 BPM
ATRIAL RATE - MUSE: 83 BPM
BASOPHILS # BLD AUTO: 0 10E3/UL (ref 0–0.2)
BASOPHILS NFR BLD AUTO: 1 %
BILIRUB DIRECT SERPL-MCNC: 0.1 MG/DL
BILIRUB SERPL-MCNC: 0.4 MG/DL (ref 0–1)
BUN SERPL-MCNC: 21 MG/DL (ref 8–28)
CALCIUM SERPL-MCNC: 9.1 MG/DL (ref 8.5–10.5)
CHLORIDE BLD-SCNC: 101 MMOL/L (ref 98–107)
CK SERPL-CCNC: 36 U/L (ref 30–190)
CO2 SERPL-SCNC: 30 MMOL/L (ref 22–31)
CREAT SERPL-MCNC: 0.77 MG/DL (ref 0.6–1.1)
DIASTOLIC BLOOD PRESSURE - MUSE: NORMAL MMHG
DIASTOLIC BLOOD PRESSURE - MUSE: NORMAL MMHG
EOSINOPHIL # BLD AUTO: 0 10E3/UL (ref 0–0.7)
EOSINOPHIL NFR BLD AUTO: 1 %
ERYTHROCYTE [DISTWIDTH] IN BLOOD BY AUTOMATED COUNT: 12.5 % (ref 10–15)
FLUAV RNA SPEC QL NAA+PROBE: NEGATIVE
FLUBV RNA RESP QL NAA+PROBE: NEGATIVE
GFR SERPL CREATININE-BSD FRML MDRD: 77 ML/MIN/1.73M2
GLUCOSE BLD-MCNC: 99 MG/DL (ref 70–125)
HCT VFR BLD AUTO: 43.7 % (ref 35–47)
HGB BLD-MCNC: 13.6 G/DL (ref 11.7–15.7)
HOLD SPECIMEN: NORMAL
IMM GRANULOCYTES # BLD: 0 10E3/UL
IMM GRANULOCYTES NFR BLD: 0 %
INTERPRETATION ECG - MUSE: NORMAL
INTERPRETATION ECG - MUSE: NORMAL
LIPASE SERPL-CCNC: 15 U/L (ref 0–52)
LYMPHOCYTES # BLD AUTO: 1.4 10E3/UL (ref 0.8–5.3)
LYMPHOCYTES NFR BLD AUTO: 26 %
MCH RBC QN AUTO: 31.7 PG (ref 26.5–33)
MCHC RBC AUTO-ENTMCNC: 31.1 G/DL (ref 31.5–36.5)
MCV RBC AUTO: 102 FL (ref 78–100)
MONOCYTES # BLD AUTO: 0.4 10E3/UL (ref 0–1.3)
MONOCYTES NFR BLD AUTO: 8 %
NEUTROPHILS # BLD AUTO: 3.5 10E3/UL (ref 1.6–8.3)
NEUTROPHILS NFR BLD AUTO: 64 %
NRBC # BLD AUTO: 0 10E3/UL
NRBC BLD AUTO-RTO: 0 /100
P AXIS - MUSE: 68 DEGREES
P AXIS - MUSE: 75 DEGREES
PLATELET # BLD AUTO: 198 10E3/UL (ref 150–450)
POTASSIUM BLD-SCNC: 4.6 MMOL/L (ref 3.5–5)
PR INTERVAL - MUSE: 168 MS
PR INTERVAL - MUSE: 178 MS
PROT SERPL-MCNC: 7.6 G/DL (ref 6–8)
QRS DURATION - MUSE: 96 MS
QRS DURATION - MUSE: 98 MS
QT - MUSE: 358 MS
QT - MUSE: 370 MS
QTC - MUSE: 420 MS
QTC - MUSE: 424 MS
R AXIS - MUSE: -6 DEGREES
R AXIS - MUSE: 6 DEGREES
RBC # BLD AUTO: 4.29 10E6/UL (ref 3.8–5.2)
RSV RNA SPEC NAA+PROBE: NEGATIVE
SARS-COV-2 RNA RESP QL NAA+PROBE: NEGATIVE
SODIUM SERPL-SCNC: 141 MMOL/L (ref 136–145)
SYSTOLIC BLOOD PRESSURE - MUSE: NORMAL MMHG
SYSTOLIC BLOOD PRESSURE - MUSE: NORMAL MMHG
T AXIS - MUSE: 72 DEGREES
T AXIS - MUSE: 96 DEGREES
TROPONIN I SERPL-MCNC: 0.01 NG/ML (ref 0–0.29)
TROPONIN I SERPL-MCNC: <0.01 NG/ML (ref 0–0.29)
VENTRICULAR RATE- MUSE: 79 BPM
VENTRICULAR RATE- MUSE: 83 BPM
WBC # BLD AUTO: 5.5 10E3/UL (ref 4–11)

## 2022-11-07 PROCEDURE — 84484 ASSAY OF TROPONIN QUANT: CPT | Performed by: EMERGENCY MEDICINE

## 2022-11-07 PROCEDURE — 83690 ASSAY OF LIPASE: CPT | Performed by: EMERGENCY MEDICINE

## 2022-11-07 PROCEDURE — 71275 CT ANGIOGRAPHY CHEST: CPT

## 2022-11-07 PROCEDURE — 82550 ASSAY OF CK (CPK): CPT | Performed by: EMERGENCY MEDICINE

## 2022-11-07 PROCEDURE — C9803 HOPD COVID-19 SPEC COLLECT: HCPCS

## 2022-11-07 PROCEDURE — 51798 US URINE CAPACITY MEASURE: CPT

## 2022-11-07 PROCEDURE — 96374 THER/PROPH/DIAG INJ IV PUSH: CPT | Mod: 59

## 2022-11-07 PROCEDURE — 87637 SARSCOV2&INF A&B&RSV AMP PRB: CPT | Performed by: EMERGENCY MEDICINE

## 2022-11-07 PROCEDURE — 93005 ELECTROCARDIOGRAM TRACING: CPT | Performed by: EMERGENCY MEDICINE

## 2022-11-07 PROCEDURE — 250N000011 HC RX IP 250 OP 636: Performed by: EMERGENCY MEDICINE

## 2022-11-07 PROCEDURE — G0378 HOSPITAL OBSERVATION PER HR: HCPCS

## 2022-11-07 PROCEDURE — 36415 COLL VENOUS BLD VENIPUNCTURE: CPT | Performed by: EMERGENCY MEDICINE

## 2022-11-07 PROCEDURE — 82248 BILIRUBIN DIRECT: CPT | Performed by: EMERGENCY MEDICINE

## 2022-11-07 PROCEDURE — 80053 COMPREHEN METABOLIC PANEL: CPT | Performed by: EMERGENCY MEDICINE

## 2022-11-07 PROCEDURE — 85025 COMPLETE CBC W/AUTO DIFF WBC: CPT | Performed by: EMERGENCY MEDICINE

## 2022-11-07 PROCEDURE — 74174 CTA ABD&PLVS W/CONTRAST: CPT

## 2022-11-07 PROCEDURE — 250N000013 HC RX MED GY IP 250 OP 250 PS 637: Performed by: EMERGENCY MEDICINE

## 2022-11-07 PROCEDURE — 99285 EMERGENCY DEPT VISIT HI MDM: CPT | Mod: 25

## 2022-11-07 RX ORDER — IOPAMIDOL 755 MG/ML
100 INJECTION, SOLUTION INTRAVASCULAR ONCE
Status: COMPLETED | OUTPATIENT
Start: 2022-11-07 | End: 2022-11-07

## 2022-11-07 RX ORDER — FENTANYL CITRATE 50 UG/ML
25 INJECTION, SOLUTION INTRAMUSCULAR; INTRAVENOUS ONCE
Status: COMPLETED | OUTPATIENT
Start: 2022-11-07 | End: 2022-11-07

## 2022-11-07 RX ORDER — HYDRALAZINE HYDROCHLORIDE 10 MG/1
10 TABLET, FILM COATED ORAL 3 TIMES DAILY
COMMUNITY
End: 2023-01-01

## 2022-11-07 RX ORDER — OXYCODONE HYDROCHLORIDE 5 MG/1
2.5 TABLET ORAL 3 TIMES DAILY
Status: ON HOLD | COMMUNITY
End: 2022-11-09

## 2022-11-07 RX ORDER — ACETAMINOPHEN 325 MG/1
650 TABLET ORAL ONCE
Status: COMPLETED | OUTPATIENT
Start: 2022-11-07 | End: 2022-11-07

## 2022-11-07 RX ORDER — OXYCODONE HYDROCHLORIDE 5 MG/1
2.5 TABLET ORAL EVERY 6 HOURS PRN
Status: ON HOLD | COMMUNITY
End: 2023-01-01

## 2022-11-07 RX ORDER — HYDRALAZINE HYDROCHLORIDE 10 MG/1
10 TABLET, FILM COATED ORAL ONCE
Status: COMPLETED | OUTPATIENT
Start: 2022-11-07 | End: 2022-11-07

## 2022-11-07 RX ADMIN — FENTANYL CITRATE 25 MCG: 50 INJECTION, SOLUTION INTRAMUSCULAR; INTRAVENOUS at 20:05

## 2022-11-07 RX ADMIN — HYDRALAZINE HYDROCHLORIDE 10 MG: 10 TABLET, FILM COATED ORAL at 19:37

## 2022-11-07 RX ADMIN — IOPAMIDOL 100 ML: 755 INJECTION, SOLUTION INTRAVENOUS at 20:26

## 2022-11-07 RX ADMIN — OXYCODONE HYDROCHLORIDE 2.5 MG: 5 TABLET ORAL at 22:50

## 2022-11-07 RX ADMIN — ACETAMINOPHEN 650 MG: 325 TABLET, FILM COATED ORAL at 18:50

## 2022-11-07 ASSESSMENT — ENCOUNTER SYMPTOMS
COUGH: 1
ABDOMINAL PAIN: 1
SHORTNESS OF BREATH: 0
DIARRHEA: 0
FEVER: 0
DYSURIA: 0
VOMITING: 0
BACK PAIN: 0
NECK PAIN: 0
NAUSEA: 0

## 2022-11-07 ASSESSMENT — ACTIVITIES OF DAILY LIVING (ADL)
ADLS_ACUITY_SCORE: 35

## 2022-11-07 NOTE — ED TRIAGE NOTES
"Patient reports intermittent chest pain pain \"for awhile\" and has been consistent for the past days. Patient presents from a memory care unit       "

## 2022-11-08 ENCOUNTER — APPOINTMENT (OUTPATIENT)
Dept: MRI IMAGING | Facility: CLINIC | Age: 82
End: 2022-11-08
Attending: EMERGENCY MEDICINE
Payer: COMMERCIAL

## 2022-11-08 LAB
ALBUMIN UR-MCNC: NEGATIVE MG/DL
APPEARANCE UR: CLEAR
BILIRUB UR QL STRIP: NEGATIVE
COLOR UR AUTO: ABNORMAL
GLUCOSE UR STRIP-MCNC: NEGATIVE MG/DL
HGB UR QL STRIP: NEGATIVE
HOLD SPECIMEN: NORMAL
KETONES UR STRIP-MCNC: ABNORMAL MG/DL
LEUKOCYTE ESTERASE UR QL STRIP: NEGATIVE
NITRATE UR QL: NEGATIVE
PH UR STRIP: 7.5 [PH] (ref 5–7)
RBC URINE: 0 /HPF
SP GR UR STRIP: >1.05 (ref 1–1.03)
SQUAMOUS EPITHELIAL: <1 /HPF
TROPONIN I SERPL-MCNC: 0.01 NG/ML (ref 0–0.29)
TROPONIN I SERPL-MCNC: 0.02 NG/ML (ref 0–0.29)
UROBILINOGEN UR STRIP-MCNC: <2 MG/DL
WBC URINE: 0 /HPF

## 2022-11-08 PROCEDURE — 36415 COLL VENOUS BLD VENIPUNCTURE: CPT | Performed by: HOSPITALIST

## 2022-11-08 PROCEDURE — 250N000013 HC RX MED GY IP 250 OP 250 PS 637: Performed by: INTERNAL MEDICINE

## 2022-11-08 PROCEDURE — G0378 HOSPITAL OBSERVATION PER HR: HCPCS

## 2022-11-08 PROCEDURE — 250N000011 HC RX IP 250 OP 636: Performed by: INTERNAL MEDICINE

## 2022-11-08 PROCEDURE — 250N000013 HC RX MED GY IP 250 OP 250 PS 637

## 2022-11-08 PROCEDURE — 81001 URINALYSIS AUTO W/SCOPE: CPT | Performed by: EMERGENCY MEDICINE

## 2022-11-08 PROCEDURE — 99207 PR NO BILLABLE SERVICE THIS VISIT: CPT | Performed by: INTERNAL MEDICINE

## 2022-11-08 PROCEDURE — 84484 ASSAY OF TROPONIN QUANT: CPT | Performed by: HOSPITALIST

## 2022-11-08 PROCEDURE — 99215 OFFICE O/P EST HI 40 MIN: CPT | Performed by: NURSE PRACTITIONER

## 2022-11-08 PROCEDURE — 72146 MRI CHEST SPINE W/O DYE: CPT

## 2022-11-08 PROCEDURE — 250N000011 HC RX IP 250 OP 636: Performed by: HOSPITALIST

## 2022-11-08 PROCEDURE — 99417 PROLNG OP E/M EACH 15 MIN: CPT | Performed by: NURSE PRACTITIONER

## 2022-11-08 PROCEDURE — 99219 PR INITIAL OBSERVATION CARE,LEVEL II: CPT | Performed by: HOSPITALIST

## 2022-11-08 PROCEDURE — 250N000013 HC RX MED GY IP 250 OP 250 PS 637: Performed by: HOSPITALIST

## 2022-11-08 PROCEDURE — 250N000013 HC RX MED GY IP 250 OP 250 PS 637: Performed by: NURSE PRACTITIONER

## 2022-11-08 PROCEDURE — 96375 TX/PRO/DX INJ NEW DRUG ADDON: CPT

## 2022-11-08 RX ORDER — NALOXONE HYDROCHLORIDE 0.4 MG/ML
0.4 INJECTION, SOLUTION INTRAMUSCULAR; INTRAVENOUS; SUBCUTANEOUS
Status: DISCONTINUED | OUTPATIENT
Start: 2022-11-08 | End: 2022-11-09 | Stop reason: HOSPADM

## 2022-11-08 RX ORDER — ACETAMINOPHEN 500 MG
1000 TABLET ORAL 3 TIMES DAILY
Status: DISCONTINUED | OUTPATIENT
Start: 2022-11-08 | End: 2022-11-09 | Stop reason: HOSPADM

## 2022-11-08 RX ORDER — NALOXONE HYDROCHLORIDE 0.4 MG/ML
0.2 INJECTION, SOLUTION INTRAMUSCULAR; INTRAVENOUS; SUBCUTANEOUS
Status: DISCONTINUED | OUTPATIENT
Start: 2022-11-08 | End: 2022-11-09 | Stop reason: HOSPADM

## 2022-11-08 RX ORDER — AMLODIPINE BESYLATE 5 MG/1
5 TABLET ORAL ONCE
Status: DISCONTINUED | OUTPATIENT
Start: 2022-11-08 | End: 2022-11-08

## 2022-11-08 RX ORDER — ONDANSETRON 4 MG/1
4 TABLET, ORALLY DISINTEGRATING ORAL EVERY 6 HOURS PRN
Status: DISCONTINUED | OUTPATIENT
Start: 2022-11-08 | End: 2022-11-09 | Stop reason: HOSPADM

## 2022-11-08 RX ORDER — LIDOCAINE 4 G/G
1 PATCH TOPICAL
Status: DISCONTINUED | OUTPATIENT
Start: 2022-11-08 | End: 2022-11-09 | Stop reason: HOSPADM

## 2022-11-08 RX ORDER — GABAPENTIN 300 MG/1
300 CAPSULE ORAL AT BEDTIME
Status: DISCONTINUED | OUTPATIENT
Start: 2022-11-08 | End: 2022-11-09 | Stop reason: HOSPADM

## 2022-11-08 RX ORDER — LOSARTAN POTASSIUM 50 MG/1
50 TABLET ORAL 2 TIMES DAILY
Status: DISCONTINUED | OUTPATIENT
Start: 2022-11-08 | End: 2022-11-09 | Stop reason: HOSPADM

## 2022-11-08 RX ORDER — GABAPENTIN 300 MG/1
300 CAPSULE ORAL 2 TIMES DAILY
Status: DISCONTINUED | OUTPATIENT
Start: 2022-11-08 | End: 2022-11-09 | Stop reason: HOSPADM

## 2022-11-08 RX ORDER — CALCITONIN SALMON 200 [IU]/.09ML
1 SPRAY, METERED NASAL DAILY
Status: DISCONTINUED | OUTPATIENT
Start: 2022-11-08 | End: 2022-11-09 | Stop reason: HOSPADM

## 2022-11-08 RX ORDER — CARBIDOPA AND LEVODOPA 25; 100 MG/1; MG/1
1 TABLET ORAL 3 TIMES DAILY
Status: DISCONTINUED | OUTPATIENT
Start: 2022-11-08 | End: 2022-11-09 | Stop reason: HOSPADM

## 2022-11-08 RX ORDER — ONDANSETRON 2 MG/ML
4 INJECTION INTRAMUSCULAR; INTRAVENOUS EVERY 6 HOURS PRN
Status: DISCONTINUED | OUTPATIENT
Start: 2022-11-08 | End: 2022-11-09 | Stop reason: HOSPADM

## 2022-11-08 RX ORDER — AMLODIPINE BESYLATE 5 MG/1
5 TABLET ORAL DAILY
Status: DISCONTINUED | OUTPATIENT
Start: 2022-11-09 | End: 2022-11-08

## 2022-11-08 RX ORDER — HYDRALAZINE HYDROCHLORIDE 20 MG/ML
10 INJECTION INTRAMUSCULAR; INTRAVENOUS EVERY 4 HOURS PRN
Status: DISCONTINUED | OUTPATIENT
Start: 2022-11-08 | End: 2022-11-09 | Stop reason: HOSPADM

## 2022-11-08 RX ORDER — OXYCODONE HYDROCHLORIDE 5 MG/1
5 TABLET ORAL 3 TIMES DAILY
Status: DISCONTINUED | OUTPATIENT
Start: 2022-11-08 | End: 2022-11-09 | Stop reason: HOSPADM

## 2022-11-08 RX ORDER — LOSARTAN POTASSIUM 25 MG/1
50 TABLET ORAL 2 TIMES DAILY
Status: DISCONTINUED | OUTPATIENT
Start: 2022-11-08 | End: 2022-11-08

## 2022-11-08 RX ORDER — LEVOTHYROXINE SODIUM 100 UG/1
100 TABLET ORAL DAILY
Status: DISCONTINUED | OUTPATIENT
Start: 2022-11-08 | End: 2022-11-09 | Stop reason: HOSPADM

## 2022-11-08 RX ORDER — PROCHLORPERAZINE 25 MG
12.5 SUPPOSITORY, RECTAL RECTAL EVERY 12 HOURS PRN
Status: DISCONTINUED | OUTPATIENT
Start: 2022-11-08 | End: 2022-11-09 | Stop reason: HOSPADM

## 2022-11-08 RX ORDER — NYSTATIN 100000 U/G
CREAM TOPICAL 2 TIMES DAILY
Status: DISCONTINUED | OUTPATIENT
Start: 2022-11-08 | End: 2022-11-09 | Stop reason: HOSPADM

## 2022-11-08 RX ORDER — QUETIAPINE FUMARATE 25 MG/1
25 TABLET, FILM COATED ORAL AT BEDTIME
Status: DISCONTINUED | OUTPATIENT
Start: 2022-11-08 | End: 2022-11-09 | Stop reason: HOSPADM

## 2022-11-08 RX ORDER — SIMETHICONE 125 MG
125 TABLET,CHEWABLE ORAL 2 TIMES DAILY PRN
Status: DISCONTINUED | OUTPATIENT
Start: 2022-11-08 | End: 2022-11-09 | Stop reason: HOSPADM

## 2022-11-08 RX ORDER — HYDRALAZINE HYDROCHLORIDE 20 MG/ML
10 INJECTION INTRAMUSCULAR; INTRAVENOUS EVERY 6 HOURS PRN
Status: DISCONTINUED | OUTPATIENT
Start: 2022-11-08 | End: 2022-11-08

## 2022-11-08 RX ORDER — LACTOBACILLUS RHAMNOSUS GG 10B CELL
1 CAPSULE ORAL DAILY
Status: DISCONTINUED | OUTPATIENT
Start: 2022-11-08 | End: 2022-11-09 | Stop reason: HOSPADM

## 2022-11-08 RX ORDER — POLYETHYLENE GLYCOL 3350 17 G/17G
17 POWDER, FOR SOLUTION ORAL 2 TIMES DAILY
Status: DISCONTINUED | OUTPATIENT
Start: 2022-11-08 | End: 2022-11-09 | Stop reason: HOSPADM

## 2022-11-08 RX ORDER — KETOROLAC TROMETHAMINE 15 MG/ML
15 INJECTION, SOLUTION INTRAMUSCULAR; INTRAVENOUS ONCE
Status: COMPLETED | OUTPATIENT
Start: 2022-11-08 | End: 2022-11-08

## 2022-11-08 RX ORDER — SIMETHICONE 125 MG
125 TABLET,CHEWABLE ORAL 2 TIMES DAILY
Status: DISCONTINUED | OUTPATIENT
Start: 2022-11-08 | End: 2022-11-09 | Stop reason: HOSPADM

## 2022-11-08 RX ORDER — DULOXETIN HYDROCHLORIDE 30 MG/1
60 CAPSULE, DELAYED RELEASE ORAL DAILY
Status: DISCONTINUED | OUTPATIENT
Start: 2022-11-08 | End: 2022-11-09 | Stop reason: HOSPADM

## 2022-11-08 RX ORDER — PROCHLORPERAZINE MALEATE 5 MG
5 TABLET ORAL EVERY 6 HOURS PRN
Status: DISCONTINUED | OUTPATIENT
Start: 2022-11-08 | End: 2022-11-09 | Stop reason: HOSPADM

## 2022-11-08 RX ORDER — BISACODYL 10 MG
10 SUPPOSITORY, RECTAL RECTAL DAILY PRN
Status: DISCONTINUED | OUTPATIENT
Start: 2022-11-08 | End: 2022-11-09 | Stop reason: HOSPADM

## 2022-11-08 RX ORDER — METOPROLOL TARTRATE 25 MG/1
25 TABLET, FILM COATED ORAL 2 TIMES DAILY
Status: DISCONTINUED | OUTPATIENT
Start: 2022-11-08 | End: 2022-11-08

## 2022-11-08 RX ORDER — METHOCARBAMOL 500 MG/1
250 TABLET ORAL 3 TIMES DAILY
Status: DISCONTINUED | OUTPATIENT
Start: 2022-11-08 | End: 2022-11-09 | Stop reason: HOSPADM

## 2022-11-08 RX ORDER — HYDRALAZINE HYDROCHLORIDE 10 MG/1
10 TABLET, FILM COATED ORAL 3 TIMES DAILY
Status: DISCONTINUED | OUTPATIENT
Start: 2022-11-08 | End: 2022-11-09 | Stop reason: HOSPADM

## 2022-11-08 RX ADMIN — DULOXETINE 60 MG: 30 CAPSULE, DELAYED RELEASE ORAL at 08:58

## 2022-11-08 RX ADMIN — OXYCODONE HYDROCHLORIDE 2.5 MG: 5 TABLET ORAL at 06:32

## 2022-11-08 RX ADMIN — Medication 1 CAPSULE: at 08:56

## 2022-11-08 RX ADMIN — LOSARTAN POTASSIUM 50 MG: 50 TABLET, FILM COATED ORAL at 08:59

## 2022-11-08 RX ADMIN — LIDOCAINE 1 PATCH: 246 PATCH TOPICAL at 09:02

## 2022-11-08 RX ADMIN — CALCITONIN SALMON 1 SPRAY: 200 SPRAY, METERED NASAL at 14:59

## 2022-11-08 RX ADMIN — POLYETHYLENE GLYCOL AND PROPYLENE GLYCOL 2 DROP: 4; 3 SOLUTION/ DROPS OPHTHALMIC at 09:03

## 2022-11-08 RX ADMIN — SIMETHICONE 125 MG: 125 TABLET, CHEWABLE ORAL at 23:37

## 2022-11-08 RX ADMIN — CARBIDOPA AND LEVODOPA 1 TABLET: 25; 100 TABLET ORAL at 08:59

## 2022-11-08 RX ADMIN — QUETIAPINE FUMARATE 25 MG: 25 TABLET ORAL at 21:54

## 2022-11-08 RX ADMIN — CARBIDOPA AND LEVODOPA 1 TABLET: 25; 100 TABLET ORAL at 02:10

## 2022-11-08 RX ADMIN — GABAPENTIN 300 MG: 300 CAPSULE ORAL at 08:59

## 2022-11-08 RX ADMIN — GABAPENTIN 300 MG: 300 CAPSULE ORAL at 21:53

## 2022-11-08 RX ADMIN — ACETAMINOPHEN 1000 MG: 500 TABLET ORAL at 14:20

## 2022-11-08 RX ADMIN — OXYCODONE HYDROCHLORIDE 2.5 MG: 5 TABLET ORAL at 00:42

## 2022-11-08 RX ADMIN — METHOCARBAMOL TABLETS 250 MG: 500 TABLET, COATED ORAL at 18:02

## 2022-11-08 RX ADMIN — HYDRALAZINE HYDROCHLORIDE 10 MG: 10 TABLET, FILM COATED ORAL at 08:58

## 2022-11-08 RX ADMIN — OXYCODONE HYDROCHLORIDE 2.5 MG: 5 TABLET ORAL at 21:53

## 2022-11-08 RX ADMIN — METHOCARBAMOL TABLETS 250 MG: 500 TABLET, COATED ORAL at 23:37

## 2022-11-08 RX ADMIN — KETOROLAC TROMETHAMINE 15 MG: 15 INJECTION, SOLUTION INTRAMUSCULAR; INTRAVENOUS at 02:47

## 2022-11-08 RX ADMIN — LOSARTAN POTASSIUM 50 MG: 50 TABLET, FILM COATED ORAL at 22:26

## 2022-11-08 RX ADMIN — LEVOTHYROXINE SODIUM 100 MCG: 100 TABLET ORAL at 06:03

## 2022-11-08 RX ADMIN — OXYCODONE HYDROCHLORIDE 5 MG: 5 TABLET ORAL at 08:58

## 2022-11-08 RX ADMIN — CARBIDOPA AND LEVODOPA 1 TABLET: 25; 100 TABLET ORAL at 14:21

## 2022-11-08 RX ADMIN — POLYETHYLENE GLYCOL 3350 17 G: 17 POWDER, FOR SOLUTION ORAL at 09:03

## 2022-11-08 RX ADMIN — POLYETHYLENE GLYCOL AND PROPYLENE GLYCOL 2 DROP: 4; 3 SOLUTION/ DROPS OPHTHALMIC at 14:22

## 2022-11-08 RX ADMIN — LOSARTAN POTASSIUM 50 MG: 50 TABLET, FILM COATED ORAL at 02:10

## 2022-11-08 RX ADMIN — ACETAMINOPHEN 1000 MG: 500 TABLET ORAL at 08:58

## 2022-11-08 RX ADMIN — SIMETHICONE 125 MG: 125 TABLET, CHEWABLE ORAL at 08:56

## 2022-11-08 RX ADMIN — OXYCODONE HYDROCHLORIDE 5 MG: 5 TABLET ORAL at 20:01

## 2022-11-08 RX ADMIN — OXYCODONE HYDROCHLORIDE 5 MG: 5 TABLET ORAL at 14:20

## 2022-11-08 RX ADMIN — GABAPENTIN 300 MG: 300 CAPSULE ORAL at 02:10

## 2022-11-08 RX ADMIN — POLYETHYLENE GLYCOL AND PROPYLENE GLYCOL 2 DROP: 4; 3 SOLUTION/ DROPS OPHTHALMIC at 23:37

## 2022-11-08 RX ADMIN — HYDRALAZINE HYDROCHLORIDE 10 MG: 10 TABLET, FILM COATED ORAL at 22:26

## 2022-11-08 RX ADMIN — GABAPENTIN 300 MG: 300 CAPSULE ORAL at 14:21

## 2022-11-08 RX ADMIN — ACETAMINOPHEN 1000 MG: 500 TABLET ORAL at 20:01

## 2022-11-08 RX ADMIN — HYDRALAZINE HYDROCHLORIDE 10 MG: 20 INJECTION, SOLUTION INTRAMUSCULAR; INTRAVENOUS at 19:24

## 2022-11-08 RX ADMIN — HYDRALAZINE HYDROCHLORIDE 10 MG: 10 TABLET, FILM COATED ORAL at 14:20

## 2022-11-08 RX ADMIN — CARBIDOPA AND LEVODOPA 1 TABLET: 25; 100 TABLET ORAL at 21:53

## 2022-11-08 ASSESSMENT — ACTIVITIES OF DAILY LIVING (ADL)
ADLS_ACUITY_SCORE: 35
ADLS_ACUITY_SCORE: 45
ADLS_ACUITY_SCORE: 45
DEPENDENT_IADLS:: CLEANING;COOKING;LAUNDRY;SHOPPING;MEAL PREPARATION;MEDICATION MANAGEMENT;MONEY MANAGEMENT;TRANSPORTATION;INCONTINENCE
ADLS_ACUITY_SCORE: 45
ADLS_ACUITY_SCORE: 45
ADLS_ACUITY_SCORE: 35
ADLS_ACUITY_SCORE: 45
ADLS_ACUITY_SCORE: 45
ADLS_ACUITY_SCORE: 33
ADLS_ACUITY_SCORE: 35

## 2022-11-08 NOTE — H&P
[facility] MEDICINE ADMISSION HISTORY AND PHYSICAL     Assessment & Plan       Mehreen Camara is a 82 year old female who presented with complaints of chest tightness.    Medical history is notable for Lewy body dementia, multiple system atrophy, anxiety and depression, chronic pain, hypertension.  She lives in a memory care facility.    Initial evaluation revealed elevated blood pressures over 230 systolic, labs were unremarkable, CTA chest abdomen pelvis was negative, EKG was negative for acute ischemic changes.    Initial treatment included Tylenol, fentanyl, hydralazine, oxycodone.  MRI was ordered.      Assessment and plan:  Chest pain  Monitor troponins, telemetry  Difficult to truly assess if this was the reason for presentation, not really complaining of chest pain now    Chronic pain now with acute diffuse pain of unclear etiology  MRI of the thoracic and lumbar spine is pending  CT chest abdomen pelvis was reassuring  She does have known spinal disease  Not clear that she would be a surgical candidate  Continue oxycodone as needed  Takes oxycodone 2.5 mg 3 times daily as an outpatient with 2.5 every 6 as needed  Also takes gabapentin 300 mg 3 times daily, duloxetine, 1 g Tylenol 3 times daily  Increase scheduled oxycodone to 5 mg 3 times daily otherwise no change    Hypertensive urgency  Essential hypertension  Given hydralazine in the emergency department  As an outpatient takes losartan twice daily, hydralazine 3 times daily  Resume her home medications and follow clinically    Multiple system atrophy  Lewy body dementia  Takes Sinemet, Seroquel at bedtime  According to outpatient records she is DNR and do not hospitalize, focus on comfort    Clinically Significant Risk Factors Present on Admission                  # Hypertension: home medication list includes antihypertensive(s)               DVTP: Low Risk Patient   Code Status: Prior  Disposition: Observation   Expected  LOS: 1 day  Goals for the hospitalization: Improved pain control  Diet: Regular  Fluids: None    Disposition Plan      Expected Discharge Date: 11/08/2022               Chief Complaint  chest pain     HISTORY   Mehreen Camara is a 82 year old female who presented with complaints of chest pain.    Difficult historian.  Per ED provider:  Mehreen Camara is a 82 year old female with history of HTN, AAA, who presents to the ER with complaints of chest pain, abdominal pain, and body aches.     Patient reported has a history of dementia but is alert and oriented x3 at this time.  She states throughout the day she has been having some chest pain, abdominal pain, and aches throughout her body.     She denies any fevers but states she has had a little bit of a cough.  Denies any shortness of breath, vomiting, diarrhea, dysuria, hematuria.     She states that her abdominal pain is located in the suprapubic region.    To me she says she just hurts all over.  She hurts in her belly up to her shoulders down to her feet.  She is aware that she cannot really explain what she is feeling.  She says that she was not able to undergo the MRI due to pain.  Past Medical History     Past Medical History:  No date: AAA (abdominal aortic aneurysm) (H)  12/16/2013: Abdominal pain, epigastric  9/15/2016: Abnormal computed tomography scan  9/15/2016: Abnormal finding on imaging  9/26/2015: Acute encephalopathy  9/17/2018: Adjustment disorder with anxious mood  9/17/2018: Adjustment disorder with mixed anxiety and depressed mood  No date: Adrenal adenoma      Comment:  stable, thought not to be hormonally active  No date: Adrenal adenoma  No date: Agitation  11/18/2010: Anemia  2/25/2008: Aneurysm of abdominal vessel (H)      Comment:  Created by [facility] Annotation: Jun 8 2011  8:07AM - Danni Ortiz: 4.4 on 11/2013.   Needs                6-12 month u/s or CT.  Replacement Utility updated for                 latest IMO load  Overview:  2/08 Abd US: 5.4 cm. 3/08 CT                Abd: 2.7 x 3.3. 9/08:  MRI Abd: 2.7X3.2  Next 9/09.  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      Comment:  Created by Conversion [facility] Annotation: Feb  3                2012  8:Cameron Elam: Needs EGD 2/2017                  Overview:  Overview:  Created by [employer] [facility]                Annotation: Feb  3 2012  8:32Cameron Tejada: Needs                EGD 2/2017 9/19/2016: Benign neoplasm of ascending colon  8/5/2020: Bilateral knee pain  5/5/2017: Bloating  9/17/2018: Bradycardia  9/17/2018: Cataract      Comment:  Created by Conversion   Overview:  Overview:  Created by               Conversion  1/20/2013: Chest wall pain  9/29/2016: Chronic low back pain  No date: Cognitive and behavioral changes  10/10/2020: Compression fracture of lumbar vertebra (H)  No date: Constipation  11/22/2006: Contact dermatitis and eczema  2/26/2008: DDD (degenerative disc disease), lumbosacral  No date: Depression  7/13/2009: Depression, major  No date: Depressive disorder  9/18/2018: Diaphragmatic hernia      Comment:  Created by Conversion  Replacement Utility updated for                latest IMO load  Overview:  Overview:  Created by                Conversion  Replacement Utility updated for latest IMO                load  9/15/2016: Dietary counseling and surveillance  9/17/2018: Disorder of bone and cartilage      Comment:  Created by Conversion [facility] Annotation: Dec 13                2012  7:41Roberta Kelly: vit D/Ca, f/u  Dexa                needed 1/2014.  Replacement Utility updated for latest                IMO load  Overview:  Overview:  Created by Conversion                 [facility] Annotation: Dec 13 2012  7:41Roberta Kelly: vit D/Ca, f/u  Dexa needed 1/2014.  Replacement                Utility updated for latest IMO load  12/18/2014: Diverticulosis of colon  No date: Dizziness and giddiness      Comment:  Created by Conversion   12/17/2013: Dysphagia  12/16/2013: Early satiety  No date: Esophageal reflux      Comment:  Created by Conversion   11/22/2006: Essential hypertension      Comment:  Created by Conversion  Replacement Utility updated for                latest IMO load  Overview:  Overview:  Created by                Conversion  Replacement Utility updated for latest IMO                load  8/5/2020: Fall  2/19/2014: Flatulence, eructation and gas pain  9/19/2016: Gaseous abdominal distention  6/6/2017: Gastro-esophageal reflux disease with esophagitis  7/4/2018: Generalized muscle weakness  No date: GERD (gastroesophageal reflux disease)  9/19/2016: Hemorrhage of rectum and anus  No date: Hiatal hernia  No date: HLD (hyperlipidemia)  4/6/2010: Hoarseness of voice  No date: HTN (hypertension)  No date: Hypertension  6/29/2007: Hypokalemia  No date: Hypothyroid  9/22/2009: Hypothyroidism      Comment:  Created by Conversion  Replacement Utility updated for                latest IMO load  Overview:  Overview:  Created by                Conversion  Replacement Utility updated for latest IMO                load  9/17/2018: Insomnia due to anxiety and fear  No date: Insomnia due to mental condition  11/22/2006: Irritable bowel syndrome  No date: Lower back pain  9/17/2018: Major depressive disorder, recurrent episode (H)      Comment:  Created by Conversion  Replacement Utility updated for                latest IMO load  Overview:  Overview:  Created by                Conversion  Replacement Utility updated for latest IMO                load  No date: Major depressive disorder, single episode, moderate (H)  7/10/2007: Malaise and fatigue  No date:  Metabolic encephalopathy  No date: Migraine with aura  2/10/2008: Mixed hyperlipidemia      Comment:  Created by Conversion   No date: Mood disorder due to a general medical condition  9/19/2018: Movement disorder  No date: Multiple system atrophy P (H)  11/14/2018: Multiple system atrophy P (H)  No date: Murmur  10/23/2020: Nonrheumatic aortic valve stenosis  11/22/2006: Nontoxic multinodular goiter      Comment:  Overview:  thyroidectomy 7/07 for atypical cells on FNA-               benign cysts seen at time of surgical pathology  No date: Obesity, unspecified      Comment:  Created by Conversion   9/22/2017: Orthostatic hypotension dysautonomic syndrome  No date: Osteoarthritis  No date: Partial small bowel obstruction (H)  9/19/2016: Polyp of colon  12/12/2018: Post-op pain  1/15/2013: Postoperative hypothyroidism      Comment:  Overview:  7/2/07-  Thyroidectomy by Dr Wang Crenshaw  10/26/2010: Prediabetes  7/4/2017: Primary insomnia  8/5/2020: REM sleep behavior disorder  4/28/2014: Retinal migraine  No date: Rheumatic fever      Comment:  thought to have had as a child  12/31/2019: Rupture of left Achilles tendon, sequela  11/30/2015: S/P AAA repair using bifurcation graft  12/12/2018: S/P laparoscopic cholecystectomy  No date: Scoliosis  No date: Sleep difficulties  No date: Small bowel obstruction (H)  No date: Thyroid disease  No date: Thyroid nodule      Comment:  removed nodules and thyroid  No date: Tinnitus      Comment:  Created by Conversion  Replacement Utility updated for                latest IMO load  11/22/2006: Undiagnosed cardiac murmurs  1/15/2013: Vitamin D deficiency  4/29/2018: Weak  5/4/2017: Weakness  7/5/2018: Weakness of both lower extremities     Surgical History     Past Surgical History:   Procedure Laterality Date     AAA REPAIR  2015    wtih bifurcation graft     CARPAL TUNNEL RELEASE RT/LT Bilateral 2013     HC REVISE MEDIAN N/CARPAL TUNNEL SURG      Description: Neuroplasty  Decompression Median Nerve At Carpal Tunnel;  Recorded: 2013;  Comments: bilateral     HYSTERECTOMY       IR ABDOMINAL ENDOVASCULAR STENT GRAFT  2015     JOINT REPLACEMENT Right 2003     JOINT REPLACEMENT       LAPAROSCOPIC CHOLECYSTECTOMY N/A 2018    Procedure: LAPAROSCOPIC CHOLECYSTECTOMY;  Surgeon: Amadeo Sebastian MD;  Location: [facility] OR     ORTHOPEDIC SURGERY       NC THYROIDECTOMY      Description: Thyroid Surgery Total Thyroidectomy;  Recorded: 2011;     SPINE SURGERY  1981    cervical spine fusion     THYROIDECTOMY       XR MAJOR JOINT OR BURSA INJ/ASP BILATERAL  2020     XR MAJOR JOINT OR BURSA INJ/ASP UNILATERAL  2020     ZZC CERV SPINE FUSN,ANTER,BELOW C2      Description: Cervical Vertebral Fusion;  Recorded: 2013;  Comments:      ZZC TOTAL ABDOM HYSTERECTOMY      Description: Hysterectomy;  Recorded: 2013;     ZZC TOTAL HIP ARTHROPLASTY Right     Description: Total Hip Replacement;  Recorded: 2013;  Comments: right,      Family History      Family History   Problem Relation Age of Onset     Hypertension Mother      Coronary Artery Disease Mother      Coronary Artery Disease Father      Other Cancer Brother      Parkinsonism Other      Heart Disease Mother      Heart Disease Father      Cancer Brother 57.00        colon     Hypertension Daughter      Hypertension Daughter      Neuropathy Daughter      Lupus Daughter      Heart Disease Paternal Aunt      Heart Disease Paternal Uncle      Parkinsonism Paternal Uncle       Social History      Social History     Tobacco Use     Smoking status: Former     Packs/day: 0.50     Types: Cigarettes     Quit date: 1994     Years since quittin.9     Smokeless tobacco: Never   Substance Use Topics     Alcohol use: No     Drug use: No      Allergies     Allergies   Allergen Reactions     Penicillins Anaphylaxis, Rash and Shortness Of Breath     Aspirin Nausea and GI Disturbance     Atenolol  Cough     Atorvastatin Muscle Pain (Myalgia) and Nausea and Vomiting     Bupropion Nausea     Cephalexin Rash     Localized to neck area     Clindamycin Other (See Comments)     Constipation      Codeine Nausea     Ibuprofen Nausea and GI Disturbance     Lovastatin Muscle Pain (Myalgia)     Prior to Admission Medications      Prior to Admission Medications   Prescriptions Last Dose Informant Patient Reported? Taking?   DULoxetine (CYMBALTA) 60 MG capsule 11/7/2022 at 0700  Yes Yes   Sig: Take 60 mg by mouth daily   QUEtiapine (SEROQUEL) 25 MG tablet 11/6/2022  No Yes   Sig: Take 1 tablet (25 mg) by mouth At Bedtime   Simethicone 125 MG TABS Unknown  Yes Yes   Sig: Take 1 chew tab by mouth 2 times daily as needed bloating   acetaminophen (TYLENOL) 500 MG tablet 11/7/2022  No Yes   Sig: Take 2 tablets (1,000 mg) by mouth 3 times daily   bisacodyl (DULCOLAX) 10 MG suppository Unknown  Yes Yes   Sig: Place 10 mg rectally daily as needed for constipation   carbidopa-levodopa (SINEMET)  MG tablet 11/7/2022 at 1300 x2  No Yes   Sig: Take 1 tablet by mouth 3 times daily   diclofenac (VOLTAREN) 1 % topical gel Unknown  Yes Yes   Sig: Apply 2 g topically 3 times daily as needed for moderate pain   gabapentin (NEURONTIN) 300 MG capsule 11/7/2022 Nursing Home Yes Yes   Sig: Take 300 mg by mouth 2 times daily AM and 1345   gabapentin (NEURONTIN) 300 MG capsule 11/6/2022 at 2000  No Yes   Sig: Take 1 capsule (300 mg) by mouth At Bedtime   hydrALAZINE (APRESOLINE) 10 MG tablet 11/7/2022  Yes Yes   Sig: Take 10 mg by mouth 3 times daily   lactobacillus rhamnosus (GG) (CULTURELL) capsule 11/7/2022 at 0700  Yes Yes   Sig: Take 1 capsule by mouth daily   levothyroxine (SYNTHROID/LEVOTHROID) 100 MCG tablet 11/7/2022 at 0700  No Yes   Sig: Take 1 tablet (100 mcg) by mouth daily   lidocaine (LIDODERM) 5 % patch 11/7/2022 at 0700  Yes Yes   Sig: Place 1 patch onto the skin every 24 hours To prevent lidocaine toxicity, patient should  be patch free for 12 hrs daily.   losartan (COZAAR) 50 MG tablet 11/7/2022 at 0700  Yes Yes   Sig: Take 50 mg by mouth 2 times daily   naloxone (NARCAN) 4 MG/0.1ML nasal spray Unknown  Yes Yes   Sig: Spray 4 mg into one nostril alternating nostrils once as needed for opioid reversal every 2-3 minutes until assistance arrives   nystatin (MYCOSTATIN) 036403 UNIT/GM external cream 11/7/2022 at 0700  Yes Yes   Sig: Apply topically 2 times daily APPLY TOPICALLY TO AREA UNDER  right BREAST   oxyCODONE (ROXICODONE) 5 MG tablet 11/7/2022 at 1345  Yes Yes   Sig: Take 2.5 mg by mouth 3 times daily   oxyCODONE (ROXICODONE) 5 MG tablet Unknown  Yes Yes   Sig: Take 2.5 mg by mouth every 6 hours as needed for severe pain   polyethylene glycol (MIRALAX) 17 GM/Dose powder 11/7/2022 at 0700  No Yes   Sig: Take 17 g by mouth 2 times daily   polyethylene glycol-propylene glycol (SYSTANE ULTRA) 0.4-0.3 % SOLN ophthalmic solution 11/7/2022 at 1345  Yes Yes   Sig: Place 2 drops into both eyes 3 times daily (and additionally as needed/requested)   polyethylene glycol-propylene glycol (SYSTANE ULTRA) 0.4-0.3 % SOLN ophthalmic solution Unknown  Yes Yes   Sig: Place 2 drops into both eyes every hour as needed for dry eyes   senna-docusate (SENOKOT-S/PERICOLACE) 8.6-50 MG tablet 11/7/2022 at 0700  Yes Yes   Sig: Take 1 tablet by mouth daily   senna-docusate (SENOKOT-S/PERICOLACE) 8.6-50 MG tablet Unknown  Yes Yes   Sig: Take 1 tablet by mouth 2 times daily as needed for constipation   simethicone (MYLICON) 125 MG chewable tablet 11/7/2022 at 0700  Yes Yes   Sig: Take 125 mg by mouth 2 times daily   sodium phosphate (FLEET ENEMA) 7-19 GM/118ML rectal enema Unknown  Yes Yes   Sig: Place 1 enema rectally once as needed for constipation   vitamin D3 (CHOLECALCIFEROL) 50 mcg (2000 units) tablet 11/7/2022 at 0700  No Yes   Sig: Take 1 tablet (50 mcg) by mouth daily      Facility-Administered Medications: None      Review of Systems     A 12 point  comprehensive review of systems was negative except as noted above in HPI.    PHYSICAL EXAMINATION     Vitals      Temp:  [98.4  F (36.9  C)] 98.4  F (36.9  C)  Pulse:  [78-92] 79  Resp:  [17-24] 19  BP: (171-239)/() 180/85  SpO2:  [93 %-98 %] 97 %    Examination   Physical Exam:    Gen: no acute distress, comfortable, seems slightly confused  ENT: no scleral icterus  Pulm: lungs are clear without wheezing, crackles, breathing comfortably at rest  CV: regular rate and rhythm, no significant lower extremity pitting edema  GI: abdomen is soft, non-tender, non-distended with active bowel sounds.  MSK: no obvious deformities of the extremities  Derm: Not pale, no jaundice  Psych: Slightly confused and anxious      Pertinent Radiology     Radiology Results:   Recent Results (from the past 24 hour(s))   CTA Chest Abdomen Pelvis w Contrast    Narrative    EXAM: CTA CHEST ABDOMEN PELVIS W CONTRAST  LOCATION: [facility]  DATE/TIME: 11/7/2022 8:19 PM    INDICATION: chest and abd pain  COMPARISON: 05/16/2022  TECHNIQUE: CT angiogram chest abdomen pelvis during arterial phase of injection of IV contrast. 2D and 3D MIP reconstructions were performed by the CT technologist. Dose reduction techniques were used.   CONTRAST: 100ml Isovue 370    FINDINGS:   CT ANGIOGRAM CHEST, ABDOMEN, AND PELVIS: Mild calcified and noncalcified plaque within the thoracic aorta. No aneurysm or dissection. Patent aortobiiliac stent. Variant celiac axis anatomy with direct origin of the common hepatic artery from the aorta.   Visceral branches are patent.    LUNGS AND PLEURA: Normal.    MEDIASTINUM/AXILLAE: No lymphadenopathy. No central pulmonary emboli.    CORONARY ARTERY CALCIFICATION: Mild.    HEPATOBILIARY: Gallbladder surgically absent. Normal liver and bile ducts.    PANCREAS: Normal.    SPLEEN: Normal.    ADRENAL GLANDS: Unchanged benign left adrenal adenoma. Normal right adrenal.    KIDNEYS/BLADDER: No  significant mass, stone, or hydronephrosis. Bladder is completely obscured by streak artifact.    BOWEL: No obstruction or inflammatory change.    LYMPH NODES: Normal.    PELVIC ORGANS: No pelvic masses.    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 Osteopenia and multilevel degenerative changes. Unchanged chronic compression fractures of T12 and L4 with S-shaped curvature of the spine. Healed bilateral   pubic rami and sacral fractures.      Impression    IMPRESSION:  1.  No acute findings or other explanation for symptoms.       EKG Results: personally reviewed.     Advance Care Planning        Scot Jon, [facility]   Phone: #[phone]

## 2022-11-08 NOTE — CONSULTS
Care Management Initial Consult    General Information  Assessment completed with: Patient, Children, Patient and daughter Alysa via phone  Type of CM/SW Visit: Initial Assessment    Primary Care Provider verified and updated as needed: Yes   Readmission within the last 30 days: no previous admission in last 30 days      Reason for Consult: discharge planning  Advance Care Planning: Advance Care Planning Reviewed: present on chart, other (see comments) (Present in Epic)     General Information Comments: Lives at [facility]    Communication Assessment  Patient's communication style: spoken language (English or Bilingual)    Hearing Difficulty or Deaf: no   Wear Glasses or Blind: yes    Cognitive  Cognitive/Neuro/Behavioral: WDL                      Living Environment:   People in home: facility resident     Current living Arrangements: assisted living, other (see comments) (Memory Care)  Name of Facility: [facility]   Able to return to prior arrangements: yes  Living Arrangement Comments: Memory Care    Family/Social Support:  Care provided by: child(rachel), other (see comments) (Facility staff)  Provides care for: no one, unable/limited ability to care for self  Marital Status:   Children          Description of Support System: Supportive, Involved    Support Assessment: Adequate family and caregiver support    Current Resources:   Patient receiving home care services: No     Community Resources: Financial/Insurance  Equipment currently used at home: walker, rolling  Supplies currently used at home: Incontinence Supplies    Employment/Financial:  Employment Status: retired        Financial Concerns: No concerns identified           Lifestyle & Psychosocial Needs:  Social Determinants of Health     Tobacco Use: Medium Risk     Smoking Tobacco Use: Former     Smokeless Tobacco Use: Never     Passive Exposure: Not on file   Alcohol Use: Not on file   Financial Resource Strain: Not  on file   Food Insecurity: Not on file   Transportation Needs: Not on file   Physical Activity: Not on file   Stress: Not on file   Social Connections: Not on file   Intimate Partner Violence: Not on file   Depression: At risk     PHQ-2 Score: 4   Housing Stability: Not on file       Functional Status:  Prior to admission patient needed assistance:   Dependent ADLs:: Ambulation-walker, Bathing, Dressing, Toileting  Dependent IADLs:: Cleaning, Cooking, Laundry, Shopping, Meal Preparation, Medication Management, Money Management, Transportation, Incontinence  Assesssment of Functional Status: At functional baseline    Mental Health Status:          Chemical Dependency Status:              Values/Beliefs:  Spiritual, Cultural Beliefs, [employer] Practices, Values that affect care:               Additional Information:  Alert oriented to self, patient lives at [facility]. Presents from [facility] care to  ED for evaluation of mid sternal chest tightness today.  Patient answers simple questions regarding herself and residential appropriately. Patient did ask this writer to contact daughter Alysa for questions she couldn t answer.    Contacted tina Watt via phone ([phone]). Alysa reports that patient lives at [facility] and is assisted with all I/ADLs as needed. Uses a walker; just finished physical therapy; doesn t drive. Explained ALMANZA/Observation Status to daughter via phone and she verbalized understanding. Alysa or one of her siblings will transport patient on discharge. Other needs pending clinical progress.    INDIA ORTIZ, RN/CM

## 2022-11-08 NOTE — ED NOTES
Pt from [facility] sent to the ED for evaluation of C/O mid sternal chest tightness today.  Pt is a poor historian and denies pain in her chest currently.

## 2022-11-08 NOTE — PHARMACY-ADMISSION MEDICATION HISTORY
Pharmacy Note - Admission Medication History    Pertinent Provider Information: N/A     ______________________________________________________________________    Prior To Admission (PTA) med list completed and updated in EMR.       PTA Med List   Medication Sig Last Dose     acetaminophen (TYLENOL) 500 MG tablet Take 2 tablets (1,000 mg) by mouth 3 times daily 11/7/2022     bisacodyl (DULCOLAX) 10 MG suppository Place 10 mg rectally daily as needed for constipation Unknown     carbidopa-levodopa (SINEMET)  MG tablet Take 1 tablet by mouth 3 times daily 11/7/2022 at 1300 x2     diclofenac (VOLTAREN) 1 % topical gel Apply 2 g topically 3 times daily as needed for moderate pain Unknown     DULoxetine (CYMBALTA) 60 MG capsule Take 60 mg by mouth daily 11/7/2022 at 0700     gabapentin (NEURONTIN) 300 MG capsule Take 1 capsule (300 mg) by mouth At Bedtime 11/6/2022 at 2000     gabapentin (NEURONTIN) 300 MG capsule Take 300 mg by mouth 2 times daily AM and 1345 11/7/2022     hydrALAZINE (APRESOLINE) 10 MG tablet Take 10 mg by mouth 3 times daily 11/7/2022     lactobacillus rhamnosus (GG) (CULTURELL) capsule Take 1 capsule by mouth daily 11/7/2022 at 0700     levothyroxine (SYNTHROID/LEVOTHROID) 100 MCG tablet Take 1 tablet (100 mcg) by mouth daily 11/7/2022 at 0700     lidocaine (LIDODERM) 5 % patch Place 1 patch onto the skin every 24 hours To prevent lidocaine toxicity, patient should be patch free for 12 hrs daily. 11/7/2022 at 0700     losartan (COZAAR) 50 MG tablet Take 50 mg by mouth 2 times daily 11/7/2022 at 0700     naloxone (NARCAN) 4 MG/0.1ML nasal spray Spray 4 mg into one nostril alternating nostrils once as needed for opioid reversal every 2-3 minutes until assistance arrives Unknown     nystatin (MYCOSTATIN) 273882 UNIT/GM external cream Apply topically 2 times daily APPLY TOPICALLY TO AREA UNDER  right BREAST 11/7/2022 at 0700     oxyCODONE (ROXICODONE) 5 MG tablet Take 2.5 mg by mouth 3 times daily  11/7/2022 at 1345     oxyCODONE (ROXICODONE) 5 MG tablet Take 2.5 mg by mouth every 6 hours as needed for severe pain Unknown     polyethylene glycol (MIRALAX) 17 GM/Dose powder Take 17 g by mouth 2 times daily 11/7/2022 at 0700     polyethylene glycol-propylene glycol (SYSTANE ULTRA) 0.4-0.3 % SOLN ophthalmic solution Place 2 drops into both eyes every hour as needed for dry eyes Unknown     polyethylene glycol-propylene glycol (SYSTANE ULTRA) 0.4-0.3 % SOLN ophthalmic solution Place 2 drops into both eyes 3 times daily (and additionally as needed/requested) 11/7/2022 at 1345     QUEtiapine (SEROQUEL) 25 MG tablet Take 1 tablet (25 mg) by mouth At Bedtime 11/6/2022     senna-docusate (SENOKOT-S/PERICOLACE) 8.6-50 MG tablet Take 1 tablet by mouth daily 11/7/2022 at 0700     senna-docusate (SENOKOT-S/PERICOLACE) 8.6-50 MG tablet Take 1 tablet by mouth 2 times daily as needed for constipation Unknown     simethicone (MYLICON) 125 MG chewable tablet Take 125 mg by mouth 2 times daily 11/7/2022 at 0700     Simethicone 125 MG TABS Take 1 chew tab by mouth 2 times daily as needed bloating Unknown     sodium phosphate (FLEET ENEMA) 7-19 GM/118ML rectal enema Place 1 enema rectally once as needed for constipation Unknown     vitamin D3 (CHOLECALCIFEROL) 50 mcg (2000 units) tablet Take 1 tablet (50 mcg) by mouth daily 11/7/2022 at 0700       Information source(s): Facility ([facility] medication list/MAR  Method of interview communication: N/A    Summary of Changes to PTA Med List  New: oxycodone  Discontinued: N/A  Changed: N/A    Patient was asked about OTC/herbal products specifically.  PTA med list reflects this.    In the past week, patient estimated taking medication this percent of the time:  greater than 90%.    Allergies were reviewed, assessed, and updated with the patient.      Patient does not use any multi-dose medications prior to admission.    The information provided in this note is only as accurate as the  sources available at the time of the update(s).    Thank you for the opportunity to participate in the care of this patient.    Ruslan Hdez [facility]  11/7/2022 10:47 PM

## 2022-11-08 NOTE — CONSULTS
[facility] ACUTE PAIN SERVICE CONSULTATION     Date of Admission:  11/7/2022  Date of Consult (When I saw the patient): 11/08/22  Physician requesting consult: Charity Herman PA-C     Assessment/Plan:     Mehreen Camara is a 82 year old female who was admitted on 11/7/2022.  Pain team was asked to see the patient for back pain, fracture pain, non opioid pain plan. Admitted for chest pain, abdomen pain, body aches. History of HTN, AA. The pain is reported to be chronic, located in the neck, shoulders, mid-low back, legs, feet. Current pain is rated at 4-7/10 and goal is 0/10.  Patient seen at bedside, daughter at bedside. Patient reported has a history of dementia but is alert and oriented x3 at this time.  During this visit she reports pain in left shoulder, neck, low back, both thighs, feet.    Opioid Induced Respiratory Depression Risk Assessment: high ?  (Low 0-1; Moderate 2-4; or High >4 or >/= 3 if two of the risk factors are age > 60 and opioid naive) due to the following risk factors: HARIS, COPD/Asthma/pulmonary disease, CHF, renal dysfunction, hepatic dysfunction, Obesity, Smoker, Age>60, >2 opioid therapies, concomitant CNS depressants, opioid naive status, or post surgical ?     PLAN:   1) Pain is consistent with all over pain, fracture pain. Labs and imaging indicated: I have personally reviewed pertinent labs, tests, and radiologic imaging in patient's chart. Treatment plan includes multimodal pain approach, Hospital Medicine Service for medical management, PT, OT. Patient educated regarding multimodal pain approach, medications as listed below. Patient is understanding of the plan. All questions and concerns addressed to patient's satisfaction.   2)Multimodal Medication Therapy  Topical: lidocaine patch  NSAID'S: none, could consider voltaren gel   Muscle Relaxants: add Robaxin 250 mg tid   Adjuvants: calcitonin, Gabapentin 300 mg tid,  APAP  Antidepressants/anxiolytics: Seroquel 25 mg at  bedtime, cymbalta daily   Opioids: Oxycodone 2.5 mg q6h prn and 5 mg tid   IV Pain medication: none  3)Non-medication interventions: brace, PT, OT  Acupuncture consult - offered and declined  Integrative consult - offered and declined  4)Constipation Prophylaxis: Scheduled and prn     -Opioid prescriber has been: most recent: Dede Torrez [facility] Zamorano  -[place]  pulled from system on 11/8/22. This indicates   10/13/22 Oxycodone 5 mg #90 - same on 8/29/22 7/27/22 Oxycodone 5 mg #105 - same on 6/2/22 5/5/2022 Belbuca 150 mcg film #28  4/14/2022 Belbuca 75 mcg film #60  4/1/2022 gabapentin 300 mg #112  3/9/2022 Belbuca 75 mcg film #60  Has had fills for oxycodone 5 mg tablets in June, July, September, November 2021  Discharge Recommendations - We recommend prescribing the following at the time of discharge: TBD     History of Present Illness (HPI):       Mehreen Camara is a 82 year old old female who presented for chest pain, abdomen pain, body aches.  Past medical history as above. The pain is reported to be chronic, located in the neck, shoulders, mid-low back, legs, feet. Current pain is rated at 4-7/10 and goal is 0/10.  Patient seen at bedside, daughter at bedside. Patient reported has a history of dementia but is alert and oriented x3 at this time.  She states she has pain all over. She reports pain as constant, aching, spasm, tight, sore, burning. She denies any fevers but states she has had a little bit of a cough.  Denies any shortness of breath, vomiting, diarrhea, dysuria, hematuria. During this visit she reports pain in left shoulder, neck, low back, both thighs, feet.     Per [place]  review, the patient does have a mild opioid tolerance. Opioid induced side effects noted and include: sedation, confusion, paranoia, constipation.      Reviewed medical record, labs, imaging, ED note, and care everywhere.     Home pain medications/psych medications/anticoagulation medications include: APAP,  Voltaren, Cymbalta, Gabapentin, lidocaine patch, Seroquel       Medical History   PAST MEDICAL HISTORY:   Past Medical History:   Diagnosis Date     AAA (abdominal aortic aneurysm) (H)      Abdominal pain, epigastric 12/16/2013     Abnormal computed tomography scan 9/15/2016     Abnormal finding on imaging 9/15/2016     Acute encephalopathy 9/26/2015     Adjustment disorder with anxious mood 9/17/2018     Adjustment disorder with mixed anxiety and depressed mood 9/17/2018     Adrenal adenoma     stable, thought not to be hormonally active     Adrenal adenoma      Agitation      Anemia 11/18/2010     Aneurysm of abdominal vessel (H) 2/25/2008    Created by [employer] Annotation: Jun 8 2011  8:07AM - Danni Ortiz: 4.4 on 11/2013.   Needs 6-12 month u/s or CT.  Replacement Utility updated for latest IMO load  Overview:  2/08 Abd US: 5.4 cm. 3/08 CT Abd: 2.7 x 3.3. 9/08:  MRI Abd: 2.7X3.2  Next 9/09.     Anxiety state 2/26/2008     Arthritis      Asymptomatic postmenopausal status     Created by Conversion  Replacement Utility updated for latest IMO load     Back pain 5/16/2020     Harris esophagus      Harris's esophagus 1/11/2012    Created by [employer] Annotation: Feb  3 2012  8:32Cameron Tejada: Needs EGD 2/2017   Overview:  Overview:  Created by [employer] [facility] Annotation: Feb  3 2012  8:32Cameron Tejada: Needs EGD 2/2017     Benign neoplasm of ascending colon 9/19/2016     Bilateral knee pain 8/5/2020     Bloating 5/5/2017     Bradycardia 9/17/2018     Cataract 9/17/2018    Created by Conversion   Overview:  Overview:  Created by Conversion     Chest wall pain 1/20/2013     Chronic low back pain 9/29/2016     Cognitive and behavioral changes      Compression fracture of lumbar vertebra (H) 10/10/2020     Constipation      Contact dermatitis and eczema 11/22/2006     DDD (degenerative disc disease), lumbosacral 2/26/2008     Depression      Depression, major 7/13/2009      Depressive disorder      Diaphragmatic hernia 9/18/2018    Created by Conversion  Replacement Utility updated for latest IMO load  Overview:  Overview:  Created by Conversion  Replacement Utility updated for latest IMO load     Dietary counseling and surveillance 9/15/2016     Disorder of bone and cartilage 9/17/2018    Created by Conversion [employer] [facility] Annotation: Dec 13 2012  7:Roberta Padron: vit D/Ca, f/u  Dexa needed 1/2014.  Replacement Utility updated for latest IMO load  Overview:  Overview:  Created by Conversion [facility] Annotation: Dec 13 2012  7:Roberta Padron: vit D/Ca, f/u  Dexa needed 1/2014.  Replacement Utility updated for latest IMO load     Diverticulosis of colon 12/18/2014     Dizziness and giddiness     Created by Conversion      Dysphagia 12/17/2013     Early satiety 12/16/2013     Esophageal reflux     Created by Conversion      Essential hypertension 11/22/2006    Created by Conversion  Replacement Utility updated for latest IMO load  Overview:  Overview:  Created by Conversion  Replacement Utility updated for latest IMO load     Fall 8/5/2020     Flatulence, eructation and gas pain 2/19/2014     Gaseous abdominal distention 9/19/2016     Gastro-esophageal reflux disease with esophagitis 6/6/2017     Generalized muscle weakness 7/4/2018     GERD (gastroesophageal reflux disease)      Hemorrhage of rectum and anus 9/19/2016     Hiatal hernia      HLD (hyperlipidemia)      Hoarseness of voice 4/6/2010     HTN (hypertension)      Hypertension      Hypokalemia 6/29/2007     Hypothyroid      Hypothyroidism 9/22/2009    Created by Conversion  Replacement Utility updated for latest IMO load  Overview:  Overview:  Created by Conversion  Replacement Utility updated for latest IMO load     Insomnia due to anxiety and fear 9/17/2018     Insomnia due to mental condition      Irritable bowel syndrome 11/22/2006     Lower back pain      Major depressive disorder, recurrent episode  (H) 9/17/2018    Created by Conversion  Replacement Utility updated for latest IMO load  Overview:  Overview:  Created by Conversion  Replacement Utility updated for latest IMO load     Major depressive disorder, single episode, moderate (H)      Malaise and fatigue 7/10/2007     Metabolic encephalopathy      Migraine with aura      Mixed hyperlipidemia 2/10/2008    Created by Conversion      Mood disorder due to a general medical condition      Movement disorder 9/19/2018     Multiple system atrophy P (H)      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     Murmur      Nonrheumatic aortic valve stenosis 10/23/2020     Nontoxic multinodular goiter 11/22/2006    Overview:  thyroidectomy 7/07 for atypical cells on FNA- benign cysts seen at time of surgical pathology     Obesity, unspecified     Created by Conversion      Orthostatic hypotension dysautonomic syndrome 9/22/2017     Osteoarthritis      Partial small bowel obstruction (H)      Polyp of colon 9/19/2016     Post-op pain 12/12/2018     Postoperative hypothyroidism 1/15/2013    Overview:  7/2/07-  Thyroidectomy by Dr Wang Crenshaw     Prediabetes 10/26/2010     Primary insomnia 7/4/2017     REM sleep behavior disorder 8/5/2020     Retinal migraine 4/28/2014     Rheumatic fever     thought to have had as a child     Rupture of left Achilles tendon, sequela 12/31/2019     S/P AAA repair using bifurcation graft 11/30/2015     S/P laparoscopic cholecystectomy 12/12/2018     Scoliosis      Sleep difficulties      Small bowel obstruction (H)      Thyroid disease      Thyroid nodule     removed nodules and thyroid     Tinnitus     Created by Conversion  Replacement Utility updated for latest IMO load     Undiagnosed cardiac murmurs 11/22/2006     Vitamin D deficiency 1/15/2013     Weak 4/29/2018     Weakness 5/4/2017     Weakness of both lower extremities 7/5/2018       PAST SURGICAL HISTORY:   Past Surgical History:   Procedure Laterality Date     AAA REPAIR  2015     wtih bifurcation graft     CARPAL TUNNEL RELEASE RT/LT Bilateral      HC REVISE MEDIAN N/CARPAL TUNNEL SURG      Description: Neuroplasty Decompression Median Nerve At Carpal Tunnel;  Recorded: 2013;  Comments: bilateral     HYSTERECTOMY       IR ABDOMINAL ENDOVASCULAR STENT GRAFT  2015     JOINT REPLACEMENT Right 2003     JOINT REPLACEMENT       LAPAROSCOPIC CHOLECYSTECTOMY N/A 2018    Procedure: LAPAROSCOPIC CHOLECYSTECTOMY;  Surgeon: Amadeo Sebastian MD;  Location: [facility] OR     ORTHOPEDIC SURGERY       NE THYROIDECTOMY      Description: Thyroid Surgery Total Thyroidectomy;  Recorded: 2011;     SPINE SURGERY  1981    cervical spine fusion     THYROIDECTOMY       XR MAJOR JOINT OR BURSA INJ/ASP BILATERAL  2020     XR MAJOR JOINT OR BURSA INJ/ASP UNILATERAL  2020     ZZC CERV SPINE FUSN,ANTER,BELOW C2      Description: Cervical Vertebral Fusion;  Recorded: 2013;  Comments:      ZZC TOTAL ABDOM HYSTERECTOMY  1985    Description: Hysterectomy;  Recorded: 2013;     ZZC TOTAL HIP ARTHROPLASTY Right     Description: Total Hip Replacement;  Recorded: 2013;  Comments: right,        FAMILY HISTORY:   Family History   Problem Relation Age of Onset     Hypertension Mother      Coronary Artery Disease Mother      Coronary Artery Disease Father      Other Cancer Brother      Parkinsonism Other      Heart Disease Mother      Heart Disease Father      Cancer Brother 57.00        colon     Hypertension Daughter      Hypertension Daughter      Neuropathy Daughter      Lupus Daughter      Heart Disease Paternal Aunt      Heart Disease Paternal Uncle      Parkinsonism Paternal Uncle        SOCIAL HISTORY:   Social History     Tobacco Use     Smoking status: Former     Packs/day: 0.50     Types: Cigarettes     Quit date: 1994     Years since quittin.9     Smokeless tobacco: Never   Substance Use Topics     Alcohol use: No        HEALTH & LIFESTYLE  PRACTICES  Tobacco:  reports that she quit smoking about 27 years ago. Her smoking use included cigarettes. She smoked an average of .5 packs per day. She has never used smokeless tobacco.  Alcohol:  reports no history of alcohol use.  Illicit drugs:  reports no history of drug use.    Allergies  Allergies   Allergen Reactions     Penicillins Anaphylaxis, Rash and Shortness Of Breath     Aspirin Nausea and GI Disturbance     Atenolol Cough     Atorvastatin Muscle Pain (Myalgia) and Nausea and Vomiting     Bupropion Nausea     Cephalexin Rash     Localized to neck area     Clindamycin Other (See Comments)     Constipation      Codeine Nausea     Ibuprofen Nausea and GI Disturbance     Lovastatin Muscle Pain (Myalgia)       Problem List  Patient Active Problem List    Diagnosis Date Noted     Urinary tract infection without hematuria, site unspecified 05/16/2022     Priority: Medium     Closed stable burst fracture of twelfth thoracic vertebra, initial encounter (H) 05/16/2022     Priority: Medium     Agitation 02/17/2021     Priority: Medium     Asymptomatic postmenopausal status 02/17/2021     Priority: Medium     Formatting of this note might be different from the original.  Created by Conversion    Replacement Utility updated for latest IMO load       Cholangiectasis 02/17/2021     Priority: Medium     Cognitive and behavioral changes 02/17/2021     Priority: Medium     Dilated pancreatic duct 02/17/2021     Priority: Medium     Irregular bowel habits 02/17/2021     Priority: Medium     Major depressive disorder, single episode, moderate (H) 02/17/2021     Priority: Medium     Metabolic encephalopathy 02/17/2021     Priority: Medium     Mood disorder due to a general medical condition 02/17/2021     Priority: Medium     Obesity 02/17/2021     Priority: Medium     Formatting of this note might be different from the original.  Created by Conversion       Orthostatic hypotension 02/17/2021     Priority: Medium      Partial small bowel obstruction (H) 02/17/2021     Priority: Medium     Pelvic floor dysfunction 02/17/2021     Priority: Medium     Sleep difficulties 02/17/2021     Priority: Medium     Tinnitus 02/17/2021     Priority: Medium     Formatting of this note might be different from the original.  Created by Conversion    Replacement Utility updated for latest IMO load       Nausea and vomiting 02/17/2021     Priority: Medium     Psychosis, unspecified psychosis type (H) 01/04/2021     Priority: Medium     Lewy body dementia with behavioral disturbance (H) 01/04/2021     Priority: Medium     Fall from standing, initial encounter 12/23/2020     Priority: Medium     Multiple closed fractures of pelvis without disruption of pelvic ring, initial encounter (H) 12/23/2020     Priority: Medium     Compression fracture of L4 lumbar vertebra, closed, initial encounter (H) 10/11/2020     Priority: Medium     Compression fracture of fourth lumbar vertebra (H) 10/10/2020     Priority: Medium     Compression fracture of L4 vertebra, initial encounter (H) 10/10/2020     Priority: Medium     Compression fracture of lumbar vertebra (H) 10/10/2020     Priority: Medium     Fall 08/05/2020     Priority: Medium     Bilateral knee pain 08/05/2020     Priority: Medium     REM sleep behavior disorder 08/05/2020     Priority: Medium     Lives in assisted living facility 05/16/2020     Priority: Medium     Disorder of biliary tract 03/16/2020     Priority: Medium     Intestinal obstruction (H) 03/04/2020     Priority: Medium     Rupture of left Achilles tendon, sequela 12/31/2019     Priority: Medium     Abdominal pain, generalized 12/31/2019     Priority: Medium     Digestive symptom 04/18/2019     Priority: Medium     Disorder of intestine 04/04/2019     Priority: Medium     Post-op pain 12/12/2018     Priority: Medium     S/P laparoscopic cholecystectomy 12/12/2018     Priority: Medium     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  Created by Conversion    Replacement Utility updated for latest IMO load    Overview:   Overview:   Created by Conversion    Replacement Utility updated for latest IMO load       Adjustment disorder with anxious mood 09/17/2018     Priority: Medium     Adjustment disorder with mixed anxiety and depressed mood 09/17/2018     Priority: Medium     Bradycardia 09/17/2018     Priority: Medium     Cataract 09/17/2018     Priority: Medium     Overview:   Created by Conversion    Formatting of this note might be different from the original.  Created by Conversion    Overview:   Overview:   Created by Conversion       Major depressive disorder, recurrent episode (H) 09/17/2018     Priority: Medium     Overview:   Created by Conversion    Replacement Utility updated for latest IMO load    Formatting of this note might be different from the original.  Created by Conversion    Replacement Utility updated for latest IMO load    Overview:   Overview:   Created by Conversion    Replacement Utility updated for latest IMO load       Dyslipidemia 09/17/2018     Priority: Medium     Overview:   Created by Conversion    Formatting of this note might be different from the original.  Overview:   Overview:   Created by Conversion       Esophageal reflux 09/17/2018     Priority: Medium     Overview:   Created by Conversion       Insomnia due to anxiety and fear 09/17/2018     Priority: Medium     Disorder of bone and cartilage 09/17/2018     Priority: Medium     Overview:   Created by Conversion  [facility] Annotation: Dec 13 2012  7:41NURY Almeida Roberta Kapoor: vit D/Ca, f/u   Dexa needed 1/2014.    Replacement Utility updated for latest IMO load    Formatting of this note might be different from the original.  Created by [facility] Annotation: Dec 13 2012  7:41AM Juan Pablo Suzanne Kapoore: vit D/Ca, f/u   Dexa needed 1/2014.    Replacement Utility updated for latest IMO load    Overview:   Overview:   Created by [facility] Annotation: Dec 13 2012  7:41NURY Roberta Goodwin: vit D/Ca, f/u   Dexa needed 1/2014.    Replacement Utility updated for latest IMO load       Disorder of stomach 08/15/2018     Priority: Medium     Gastritis 08/13/2018     Priority: Medium     Gastroesophageal reflux disease without esophagitis 08/13/2018     Priority: Medium     Weakness of both lower extremities 07/05/2018     Priority: Medium     Lower extremity weakness 07/04/2018     Priority: Medium     Generalized muscle weakness 07/04/2018     Priority: Medium     Orthostatic hypotension dysautonomic syndrome 09/22/2017     Priority: Medium     Insomnia due to mental condition 07/04/2017     Priority: Medium     Dizziness 07/03/2017     Priority: Medium     Overview:   Created by Conversion    Formatting of this note might be different from the original.  Created by Conversion  Formatting of this note might be different from the original.  Overview:   Overview:   Created by Conversion       Gastro-esophageal reflux disease with esophagitis 06/06/2017     Priority: Medium     Bloating 05/05/2017     Priority: Medium     Weak 05/04/2017     Priority: Medium     Chronic low back pain 09/29/2016     Priority: Medium     Benign neoplasm of ascending colon 09/19/2016     Priority: Medium     Gaseous abdominal distention 09/19/2016     Priority: Medium     Hemorrhage of rectum and anus 09/19/2016     Priority: Medium     Polyp of colon 09/19/2016     Priority: Medium     Abnormal computed tomography scan 09/15/2016      Priority: Medium     Abnormal finding on imaging 09/15/2016     Priority: Medium     Advised about management of weight 09/15/2016     Priority: Medium     Dietary counseling and surveillance 09/15/2016     Priority: Medium     S/P AAA repair using bifurcation graft 11/30/2015     Priority: Medium     Diverticulosis of colon 12/18/2014     Priority: Medium     Migraine with aura, not intractable, without status migrainosus 04/28/2014     Priority: Medium     Formatting of this note might be different from the original.  Created by Conversion    Replacement Utility updated for latest IMO load       Flatulence, eructation and gas pain 02/19/2014     Priority: Medium     Dysphagia 12/17/2013     Priority: Medium     Constipation 12/16/2013     Priority: Medium     Early satiety 12/16/2013     Priority: Medium     Right upper quadrant pain 12/16/2013     Priority: Medium     Chest wall pain 01/20/2013     Priority: Medium     Postoperative hypothyroidism 01/15/2013     Priority: Medium     Formatting of this note might be different from the original.  Overview:   7/2/07-  Thyroidectomy by Dr Wang Crenshaw       Vitamin D deficiency 01/15/2013     Priority: Medium     Harris's esophagus 01/11/2012     Priority: Medium     Overview:   Created by [facility] Annotation: Feb  3 2012  8:32Cameron Tejada: Needs EGD 2/2017    Formatting of this note might be different from the original.  Created by [facility] Annotation: Feb  3 2012  8:32Cameron Tejada: Needs EGD 2/2017    Overview:   Overview:   Created by [facility] Annotation: Feb  3 2012  8:32Cameron Tejada: Needs EGD 2/2017       Anemia 11/18/2010     Priority: Medium     Retention of urine 11/02/2010     Priority: Medium     Formatting of this note might be different from the original.  Overview:   Postoperative       Prediabetes 10/26/2010     Priority: Medium     Hoarseness of voice 04/06/2010     Priority: Medium      Hypothyroidism 09/22/2009     Priority: Medium     Overview:   Created by Conversion    Replacement Utility updated for latest IMO load    Formatting of this note might be different from the original.  Created by Conversion    Replacement Utility updated for latest IMO load    Overview:   Overview:   Created by Conversion    Replacement Utility updated for latest IMO load       Depression, major 07/13/2009     Priority: Medium     Adrenal adenoma 06/04/2008     Priority: Medium     Overview:   Current hormonal evaluation through endocrinology    Formatting of this note might be different from the original.  Current hormonal evaluation through endocrinology    Overview:   Overview:   Current hormonal evaluation through endocrinology  Overview:   3/08 Abdomen CT scan: 2 cm left adrenal mass seen.  9/08 MRI  Abd: 2.9 cm  Recheck in 9/09.       Anxiety state 02/26/2008     Priority: Medium     DDD (degenerative disc disease), lumbosacral 02/26/2008     Priority: Medium     Aneurysm of abdominal vessel 02/25/2008     Priority: Medium     Overview:   Created by Conversion  [employer] Annotation: Jun 8 2011  8:07AM - Angel, Danni: 4.4 on 11/2013.    Needs 6-12 month u/s or CT.    Replacement Utility updated for latest IMO load    Formatting of this note might be different from the original.  Created by [employer] [facility] Annotation: Jun 8 2011  8:07AM - Ortiz, Danni: 4.4 on 11/2013.    Needs 6-12 month u/s or CT.    Replacement Utility updated for latest IMO load    Overview:   2/08 Abd US: 5.4 cm.  3/08 CT Abd: 2.7 x 3.3.  9/08:  MRI Abd: 2.7X3.2  Next 9/09.       Mixed hyperlipidemia 02/10/2008     Priority: Medium     Formatting of this note might be different from the original.  Created by Conversion       Malaise and fatigue 07/10/2007     Priority: Medium     Hypokalemia 06/29/2007     Priority: Medium     Hypertension 11/22/2006     Priority: Medium     Overview:   Created by Conversion    Replacement  Utility updated for latest IMO load    Formatting of this note might be different from the original.  Created by Conversion    Replacement Utility updated for latest IMO load    Overview:   Overview:   Created by Conversion    Replacement Utility updated for latest IMO load       Osteoarthrosis 11/22/2006     Priority: Medium     Overview:   Created by Conversion    Replacement Utility updated for latest IMO load    Formatting of this note might be different from the original.  Created by Conversion    Replacement Utility updated for latest IMO load    Overview:   Overview:   Created by Conversion    Replacement Utility updated for latest IMO load       Contact dermatitis and eczema 11/22/2006     Priority: Medium     Nontoxic multinodular goiter 11/22/2006     Priority: Medium     Formatting of this note might be different from the original.  Overview:   thyroidectomy 7/07 for atypical cells on FNA-  benign cysts seen at time of surgical pathology       Undiagnosed cardiac murmurs 11/22/2006     Priority: Medium     Irritable bowel syndrome 11/22/2006     Priority: Medium       Prior to Admission Medications   Medications Prior to Admission   Medication Sig Dispense Refill Last Dose     acetaminophen (TYLENOL) 500 MG tablet Take 2 tablets (1,000 mg) by mouth 3 times daily   11/7/2022     bisacodyl (DULCOLAX) 10 MG suppository Place 10 mg rectally daily as needed for constipation   Unknown     carbidopa-levodopa (SINEMET)  MG tablet Take 1 tablet by mouth 3 times daily 270 tablet 3 11/7/2022 at 1300 x2     diclofenac (VOLTAREN) 1 % topical gel Apply 2 g topically 3 times daily as needed for moderate pain   Unknown     DULoxetine (CYMBALTA) 60 MG capsule Take 60 mg by mouth daily   11/7/2022 at 0700     gabapentin (NEURONTIN) 300 MG capsule Take 1 capsule (300 mg) by mouth At Bedtime 30 capsule 0 11/6/2022 at 2000     gabapentin (NEURONTIN) 300 MG capsule Take 300 mg by mouth 2 times daily AM and 1345    11/7/2022     hydrALAZINE (APRESOLINE) 10 MG tablet Take 10 mg by mouth 3 times daily   11/7/2022     lactobacillus rhamnosus (GG) (CULTURELL) capsule Take 1 capsule by mouth daily   11/7/2022 at 0700     levothyroxine (SYNTHROID/LEVOTHROID) 100 MCG tablet Take 1 tablet (100 mcg) by mouth daily   11/7/2022 at 0700     lidocaine (LIDODERM) 5 % patch Place 1 patch onto the skin every 24 hours To prevent lidocaine toxicity, patient should be patch free for 12 hrs daily.   11/7/2022 at 0700     losartan (COZAAR) 50 MG tablet Take 50 mg by mouth 2 times daily   11/7/2022 at 0700     naloxone (NARCAN) 4 MG/0.1ML nasal spray Spray 4 mg into one nostril alternating nostrils once as needed for opioid reversal every 2-3 minutes until assistance arrives   Unknown     nystatin (MYCOSTATIN) 958212 UNIT/GM external cream Apply topically 2 times daily APPLY TOPICALLY TO AREA UNDER  right BREAST   11/7/2022 at 0700     oxyCODONE (ROXICODONE) 5 MG tablet Take 2.5 mg by mouth 3 times daily   11/7/2022 at 1345     oxyCODONE (ROXICODONE) 5 MG tablet Take 2.5 mg by mouth every 6 hours as needed for severe pain   Unknown     polyethylene glycol (MIRALAX) 17 GM/Dose powder Take 17 g by mouth 2 times daily 1020 g 3 11/7/2022 at 0700     polyethylene glycol-propylene glycol (SYSTANE ULTRA) 0.4-0.3 % SOLN ophthalmic solution Place 2 drops into both eyes every hour as needed for dry eyes   Unknown     polyethylene glycol-propylene glycol (SYSTANE ULTRA) 0.4-0.3 % SOLN ophthalmic solution Place 2 drops into both eyes 3 times daily (and additionally as needed/requested)   11/7/2022 at 1345     QUEtiapine (SEROQUEL) 25 MG tablet Take 1 tablet (25 mg) by mouth At Bedtime   11/6/2022     senna-docusate (SENOKOT-S/PERICOLACE) 8.6-50 MG tablet Take 1 tablet by mouth daily   11/7/2022 at 0700     senna-docusate (SENOKOT-S/PERICOLACE) 8.6-50 MG tablet Take 1 tablet by mouth 2 times daily as needed for constipation   Unknown     simethicone (MYLICON)  "125 MG chewable tablet Take 125 mg by mouth 2 times daily   11/7/2022 at 0700     Simethicone 125 MG TABS Take 1 chew tab by mouth 2 times daily as needed bloating   Unknown     sodium phosphate (FLEET ENEMA) 7-19 GM/118ML rectal enema Place 1 enema rectally once as needed for constipation   Unknown     vitamin D3 (CHOLECALCIFEROL) 50 mcg (2000 units) tablet Take 1 tablet (50 mcg) by mouth daily   11/7/2022 at 0700       Review of Systems  Complete ROS reviewed, unless noted in HPI, all other systems reviewed (with patient) and all others found to be negative.      Objective:     Physical Exam:  /60 (BP Location: Right arm)   Pulse 92   Temp 98.1  F (36.7  C) (Oral)   Resp 20   Ht 1.676 m (5' 6\")   Wt 66.4 kg (146 lb 6.2 oz)   SpO2 99%   BMI 23.63 kg/m    Weight:   Vitals:    11/07/22 1741 11/08/22 1230   Weight: 65.8 kg (145 lb) 66.4 kg (146 lb 6.2 oz)      Body mass index is 23.63 kg/m .    General Appearance:  Alert, cooperative, no distress   Head:  Normocephalic, without obvious abnormality, atraumatic   Eyes:  PERRL, conjunctiva/corneas clear, EOM's intact   ENT/Throat: Lips, mucosa, and tongue normal; teeth and gums normal   Lymph/Neck: Supple, symmetrical, trachea midline   Lungs:   Clear to auscultation bilaterally, respirations unlabored   Chest Wall:  No tenderness or deformity   Cardiovascular/Heart:  Regular rate and rhythm, S1, S2 normal   Abdomen:   Soft, non-tender, bowel sounds active all four quadrants,  no masses, no organomegaly   Musculoskeletal: Extremities normal, atraumatic   Skin: Skin warm, dry    Neurologic: Alert and to person, Moves all 4 extremities     Psych: Affect is appropriate       Imaging: Reviewed I have personally reviewed pertinent labs, tests, and radiologic imaging in patient's chart.  CTA Chest Abdomen Pelvis w Contrast    Result Date: 11/7/2022  EXAM: CTA CHEST ABDOMEN PELVIS W CONTRAST LOCATION: [facility] DATE/TIME: 11/7/2022 8:19 " PM INDICATION: chest and abd pain COMPARISON: 05/16/2022 TECHNIQUE: CT angiogram chest abdomen pelvis during arterial phase of injection of IV contrast. 2D and 3D MIP reconstructions were performed by the CT technologist. Dose reduction techniques were used. CONTRAST: 100ml Isovue 370 FINDINGS: CT ANGIOGRAM CHEST, ABDOMEN, AND PELVIS: Mild calcified and noncalcified plaque within the thoracic aorta. No aneurysm or dissection. Patent aortobiiliac stent. Variant celiac axis anatomy with direct origin of the common hepatic artery from the aorta. Visceral branches are patent. LUNGS AND PLEURA: Normal. MEDIASTINUM/AXILLAE: No lymphadenopathy. No central pulmonary emboli. CORONARY ARTERY CALCIFICATION: Mild. HEPATOBILIARY: Gallbladder surgically absent. Normal liver and bile ducts. PANCREAS: Normal. SPLEEN: Normal. ADRENAL GLANDS: Unchanged benign left adrenal adenoma. Normal right adrenal. KIDNEYS/BLADDER: No significant mass, stone, or hydronephrosis. Bladder is completely obscured by streak artifact. BOWEL: No obstruction or inflammatory change. LYMPH NODES: Normal. PELVIC ORGANS: No pelvic masses. 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 Osteopenia and multilevel degenerative changes. Unchanged chronic compression fractures of T12 and L4 with S-shaped curvature of the spine. Healed bilateral pubic rami and sacral fractures.     IMPRESSION: 1.  No acute findings or other explanation for symptoms.     Thoracic spine MRI w/o contrast    Result Date: 11/8/2022  EXAM: MR THORACIC SPINE W/O CONTRAST LOCATION: [facility] DATE/TIME: 11/8/2022 7:58 AM INDICATION: Increasing back pain, leg pain, and leg weakness COMPARISON: CT thoracic spine 05/16/2022 TECHNIQUE: Routine Thoracic Spine MRI without IV contrast. Due to patient discomfort axial imaging of the lower thoracic spine and the requested lumbar spine MRI could not be completed. FINDINGS:  Multiple sequences are degraded by patient motion. 12 rib bearing thoracic type vertebra. Midthoracic dextrocurvature centered at T8-T9. 3 mm degenerative type anterolisthesis at C7-T1. No additional subluxation identified. Superior endplate compression fracture at T12 with progressive vertebral height loss at this level compared to the prior CT. There is now marked central vertebral height loss at T12 focally. 2 mm retropulsion of the posterior superior T12 vertebral margin. Vertebral body heights elsewhere are maintained. Trace residual osseous edema involving the superior endplate of T12. A large field-of-view localizer sequence demonstrates additional moderate inferiorly compression fracture of L3 and mild to moderate superior endplate compression fractures at L4 and L5 similar to the previous exam. Mild loss of disc height and signal on the left along the concavity of the spinal curvature from T7 through T10. Slight annular bulging at these levels. No significant focal disc herniation  identified. Multilevel thoracic facet arthropathy, greatest at the cervicothoracic junction and on the left along the concavity of the mid-lower thoracic curvature. No central spinal canal stenosis. Mild left neural foraminal stenosis at multiple levels  from T7 through T10. Mild bilateral neural foraminal stenosis at T11-T12. No definite extraspinal abnormality is visualized.     IMPRESSION: 1.  Axial imaging of the lower thoracic spine and requested lumbar spine MRI were not completed due to patient discomfort. 2.  Marked central compression deformity of T12 with progressive vertebral height loss at this level compared to 05/16/2022. This progression is favored to be chronic in nature; however, there is minor edema of the superior endplate at T12 and more recent subacute progression of the compression deformity is possible. 3.   images demonstrate multiple additional compression fractures from L3 through L5 similar to  findings on the 05/16/2022 CT exam. 4.  Multilevel thoracic spondylosis without high-grade central spinal canal stenosis. Neural foraminal stenosis greatest on the left along the concavity of the midthoracic dextrocurvature from T7 through T10.      Labs: Reviewed I have personally reviewed pertinent labs, tests, and radiologic imaging in patient's chart.  Recent Results (from the past 24 hour(s))   ECG 12-LEAD WITH MUSE ([facility])    Collection Time: 11/07/22  5:44 PM   Result Value Ref Range    Systolic Blood Pressure  mmHg    Diastolic Blood Pressure  mmHg    Ventricular Rate 83 BPM    Atrial Rate 83 BPM    MS Interval 178 ms    QRS Duration 98 ms     ms    QTc 420 ms    P Axis 68 degrees    R AXIS -6 degrees    T Axis 72 degrees    Interpretation ECG       Sinus rhythm  Possible Left atrial enlargement  Left ventricular hypertrophy  Inferior infarct (cited on or before 25-SEP-2015)  Anteroseptal infarct (cited on or before 25-SEP-2015)  Abnormal ECG  When compared with ECG of 20-MAY-2022 14:04,  No significant change was found  Confirmed by SEE ED PROVIDER NOTE FOR, ECG INTERPRETATION ([id number]),  Mariana Vila ([id number]) on 11/7/2022 5:59:17 PM     Extra Blue Top Tube    Collection Time: 11/07/22  6:05 PM   Result Value Ref Range    Hold Specimen JIC    Extra Red Top Tube    Collection Time: 11/07/22  6:05 PM   Result Value Ref Range    Hold Specimen JIC    Extra Green Top (Lithium Heparin) Tube    Collection Time: 11/07/22  6:05 PM   Result Value Ref Range    Hold Specimen JIC    Extra Purple Top Tube    Collection Time: 11/07/22  6:05 PM   Result Value Ref Range    Hold Specimen JIC    Basic metabolic panel    Collection Time: 11/07/22  6:05 PM   Result Value Ref Range    Sodium 141 136 - 145 mmol/L    Potassium 4.6 3.5 - 5.0 mmol/L    Chloride 101 98 - 107 mmol/L    Carbon Dioxide (CO2) 30 22 - 31 mmol/L    Anion Gap 10 5 - 18 mmol/L    Urea Nitrogen 21 8 - 28 mg/dL    Creatinine 0.77 0.60 - 1.10 mg/dL     Calcium 9.1 8.5 - 10.5 mg/dL    Glucose 99 70 - 125 mg/dL    GFR Estimate 77 >60 mL/min/1.73m2   Troponin I (now)    Collection Time: 11/07/22  6:05 PM   Result Value Ref Range    Troponin I <0.01 0.00 - 0.29 ng/mL   Hepatic function panel    Collection Time: 11/07/22  6:05 PM   Result Value Ref Range    Bilirubin Total 0.4 0.0 - 1.0 mg/dL    Bilirubin Direct 0.1 <=0.5 mg/dL    Protein Total 7.6 6.0 - 8.0 g/dL    Albumin 4.3 3.5 - 5.0 g/dL    Alkaline Phosphatase 60 45 - 120 U/L    AST 13 0 - 40 U/L    ALT <9 0 - 45 U/L   Lipase    Collection Time: 11/07/22  6:05 PM   Result Value Ref Range    Lipase 15 0 - 52 U/L   CK total    Collection Time: 11/07/22  6:05 PM   Result Value Ref Range    CK 36 30 - 190 U/L   CBC with platelets and differential    Collection Time: 11/07/22  6:05 PM   Result Value Ref Range    WBC Count 5.5 4.0 - 11.0 10e3/uL    RBC Count 4.29 3.80 - 5.20 10e6/uL    Hemoglobin 13.6 11.7 - 15.7 g/dL    Hematocrit 43.7 35.0 - 47.0 %     (H) 78 - 100 fL    MCH 31.7 26.5 - 33.0 pg    MCHC 31.1 (L) 31.5 - 36.5 g/dL    RDW 12.5 10.0 - 15.0 %    Platelet Count 198 150 - 450 10e3/uL    % Neutrophils 64 %    % Lymphocytes 26 %    % Monocytes 8 %    % Eosinophils 1 %    % Basophils 1 %    % Immature Granulocytes 0 %    NRBCs per 100 WBC 0 <1 /100    Absolute Neutrophils 3.5 1.6 - 8.3 10e3/uL    Absolute Lymphocytes 1.4 0.8 - 5.3 10e3/uL    Absolute Monocytes 0.4 0.0 - 1.3 10e3/uL    Absolute Eosinophils 0.0 0.0 - 0.7 10e3/uL    Absolute Basophils 0.0 0.0 - 0.2 10e3/uL    Absolute Immature Granulocytes 0.0 <=0.4 10e3/uL    Absolute NRBCs 0.0 10e3/uL   Symptomatic; Unknown Influenza A/B & SARS-CoV2 (COVID-19) Virus PCR Multiplex Nasopharyngeal    Collection Time: 11/07/22  6:43 PM    Specimen: Nasopharyngeal; Swab   Result Value Ref Range    Influenza A PCR Negative Negative    Influenza B PCR Negative Negative    RSV PCR Negative Negative    SARS CoV2 PCR Negative Negative   Troponin I (now)     Collection Time: 11/07/22  9:06 PM   Result Value Ref Range    Troponin I 0.01 0.00 - 0.29 ng/mL   ECG 12-LEAD WITH MUSE ([facility])    Collection Time: 11/07/22  9:20 PM   Result Value Ref Range    Systolic Blood Pressure  mmHg    Diastolic Blood Pressure  mmHg    Ventricular Rate 79 BPM    Atrial Rate 79 BPM    [place] Interval 168 ms    QRS Duration 96 ms     ms    QTc 424 ms    P Axis 75 degrees    R AXIS 6 degrees    T Axis 96 degrees    Interpretation ECG       Sinus rhythm  Possible Left atrial enlargement  Inferior infarct (cited on or before 25-SEP-2015)  Anterior infarct (cited on or before 25-SEP-2015)  Abnormal ECG  When compared with ECG of 07-NOV-2022 17:44,  T wave inversion more evident in Lateral leads  Confirmed by SEE ED PROVIDER NOTE FOR, ECG INTERPRETATION ([id number]),  Mariana Vila ([id number]) on 11/7/2022 9:27:25 PM     Troponin I    Collection Time: 11/08/22  1:46 AM   Result Value Ref Range    Troponin I 0.02 0.00 - 0.29 ng/mL   UA with Microscopic reflex to Culture    Collection Time: 11/08/22  2:09 AM    Specimen: Urine, Clean Catch   Result Value Ref Range    Color Urine Light Yellow Colorless, Straw, Light Yellow, Yellow    Appearance Urine Clear Clear    Glucose Urine Negative Negative mg/dL    Bilirubin Urine Negative Negative    Ketones Urine Trace (A) Negative mg/dL    Specific Gravity Urine >1.050 (H) 1.001 - 1.030    Blood Urine Negative Negative    pH Urine 7.5 (H) 5.0 - 7.0    Protein Albumin Urine Negative Negative mg/dL    Urobilinogen Urine <2.0 <2.0 mg/dL    Nitrite Urine Negative Negative    Leukocyte Esterase Urine Negative Negative    RBC Urine 0 <=2 /HPF    WBC Urine 0 <=5 /HPF    Squamous Epithelials Urine <1 <=1 /HPF   Troponin I    Collection Time: 11/08/22  6:23 AM   Result Value Ref Range    Troponin I 0.01 0.00 - 0.29 ng/mL   Extra Purple Top Tube    Collection Time: 11/08/22  6:23 AM   Result Value Ref Range    Hold Specimen JIC        Total time spent 80 minutes with  greater than 50% in consultation, education and coordination of care.       Thank you for this consultation.    Jasmin STEPHEN, FNP-C  Acute Care Pain Management Program  [facility] ([facility], [place], [place])  Monday-Friday 8a-4p   Page via online paging system or call [phone]

## 2022-11-08 NOTE — PROGRESS NOTES
Care Management Follow Up    Length of Stay (days): 0    Expected Discharge Date: 11/09/2022     Concerns to be Addressed: discharge planning  Lives in memory care  Patient plan of care discussed at interdisciplinary rounds: Yes    Anticipated Discharge Disposition: Assisted Living     Anticipated Discharge Services: TBD  Anticipated Discharge DME: TBD    Education Provided on the Discharge Plan:   No     Patient/Family in Agreement with the Plan: yes    Referrals Placed by CM/SW: TBD  Private pay costs discussed: Not applicable    Additional Information:  GIRISH spoke with Carisa, nursing director at [facility] (phone [phone], fax [phone]). Please keep her informed of discharge plans and fax orders as necessary.    Kailyn Guerra, CATSW

## 2022-11-08 NOTE — CONSULTS
Supervisory note  [facility]    I reviewed the chart, imaging, as well is the history physical examination impression and plan of Charity Herman    Medically complex 82-year-old female.  Severe degenerative changes throughout the thoracic and lumbar spine.  Flare in mid low back pain after prior diagnosis of compression fracture T12 in May 2022    MRI shows progression of the T12 compression fracture though the degree of edema/STIR signal hyperintensity is not Suggestive of acute change in the degree of compression.    Agree with recommendation for TLSO and follow-up as outpatient.  Multiple potential pain generators including compression fracture, facet arthritis, stenosis.    Thank you for the consult.  Dr. Figueroa

## 2022-11-08 NOTE — PROGRESS NOTES
Brief progress note    Evaluated patient at the bedside at 8:30 AM.  Patient was having difficulty localizing her pain.  Notes that she has chronic pain that has been present for approximately 6 years.  Notes that the pain has been worsening recently but is unable to tell me where or describe the pain in any way.    Reviewed MRI thoracic spine which reveals compression deformities which appear to be chronic.  Radiologist does note some minor edema at T12 with progressive vertebral height loss compared to previous imaging from May 2022.  Also noted are multiple additional compression fractures from L3-L5 similar to imaging from May 2022.  Multilevel thoracic spondylosis without high-grade central spinal canal stenosis as well as neural foraminal stenosis greatest on the left along the concavity of mid thoracic dextrocurvature from T7 to T10.    Consulted neurosurgery for recommendations  Pain control with patient's home regimen of Tylenol, gabapentin, oxycodone  PT/OT    Patient will likely be stable for discharge back to her memory care facility after evaluation by neurosurgery.    Sara Cabarl, DO

## 2022-11-08 NOTE — ED PROVIDER NOTES
1    EMERGENCY DEPARTMENT ENCOUNTER      NAME: Mehreen Camara  AGE: 82 year old female  YOB: 1940  MRN: [id number]  EVALUATION DATE & TIME: 2022  6:00 PM    PCP: Dede Pires    ED PROVIDER: Sara Covarrubias M.D.        Chief Complaint   Patient presents with     Chest Pain         FINAL IMPRESSION:    1. Acute low back pain, unspecified back pain laterality, unspecified whether sciatica present    2. Bilateral leg pain    3. Acute chest pain    4. Acute abdominal pain            MEDICAL DECISION MAKIN year old female with history of multiple system atrophy, hypertension, orthostatic hypotension dysautonomic syndrome, body dementia, who presents emergency department with allover body pain.  She did complain of some chest pain, lower abdominal pain, but also increasing back and leg pain.  She has a history of T12 compression fracture with some retropulsion.  Patient and family note that she has had difficulty walking now for about 6 weeks.  CT chest abdomen pelvis without acute findings.  Laboratories overall look good.  Urinalysis still pending.  Ultimately after discussion with patient and daughter plan is admission to the hospital for MRI imaging of her spine to see if that compression fractures worsening or any compression of the spine that could be causing her reports of burning abdominal pain and her leg pain.  Blood pressure elevated but given her usual hydralazine.  Ultimately patient excepted to the hospital by the hospitalist for ongoing MRI imaging, pain control, and overall management.      ED COURSE:  6:13 PM  I met with the patient to gather history and perform my exam. ED course and treatment discussed.    10:15 PM  Updated family and patient.  Discussion with patient and daughter at bedside it seems more like her chronic T12 compression fracture with some retropulsion may be causing her increased pain.  Possibility that this could be causing some of her reported  "leg pain, even the \"burning abdominal pain \".  Plan is to get MRI imaging and keep working on pain control.  They state that over the past 6 weeks that she has had increasing pain and more difficulty now with ambulation.  Both patient and daughter agree with the plan for admission to the hospital for this ongoing work-up    10:43 PM  Patient has been accepted to the hospital by the hospitalist awaiting MRI imaging and pain control.  Daughter states that patient takes half a tablet of oxycodone for pain and therefore we will start with 2.5 to see how she tolerates.    10:59 PM  Post void bladder scan was 71 cc.  Patient was incontinent of urine and unable to collect a urine for urinalysis at this time.    I do not think that this represents ACS, PE, ruptured AAA, aortic dissection, bowel obstruction, bowel ischemia, cholecystitis, pancreatitis, appendicitis, diverticulitis, kidney stone, pyelonephritis, incarcerated or strangulated hernia, ovarian torsion, viscus perforation, perforated GI ulcer, or other such etiologies at this time.  I suspect that epidural abscess, discitis, etc. are less likely, but MRI imaging should give us better information in general.  Overall though very low concerns for infection of the spine, would be more concerned of worsening compression fracture causing cord compression.    COVID-19 PPE worn during patient evaluation:  Mask: n95 and homemade masks   Eye Protection: goggles   Gown: none   Hair cover: yes  Face shield: none   Patient wearing a mask: yes     At the conclusion of the encounter I discussed the results of all of the tests and the disposition. Their questions were answered. The patient (and any family present) acknowledged understanding and were agreeable with the care plan.      CONSULTANTS:  none        MEDICATIONS GIVEN IN THE EMERGENCY:  Medications   acetaminophen (TYLENOL) tablet 650 mg (650 mg Oral Given 11/7/22 8870)   iopamidol (ISOVUE-370) solution 100 mL (100 mLs " Intravenous Given 11/7/22 2026)   hydrALAZINE (APRESOLINE) tablet 10 mg (10 mg Oral Given 11/7/22 1937)   fentaNYL (PF) (SUBLIMAZE) injection 25 mcg (25 mcg Intravenous Given 11/7/22 2005)   oxyCODONE IR (ROXICODONE) half-tab 2.5 mg (2.5 mg Oral Given 11/7/22 2250)           NEW PRESCRIPTIONS STARTED AT TODAY'S ER VISIT     Medication List      ASK your doctor about these medications    hydrALAZINE 10 MG tablet  Commonly known as: APRESOLINE  Ask about: Which instructions should I use?     polyethylene glycol 17 GM/Dose powder  Commonly known as: MIRALAX  17 g, Oral, 2 TIMES DAILY  Ask about: Which instructions should I use?                CONDITION:  stable        DISPOSITION:  Med surg obs as accepted by Dr. Jon, hospitalist         =================================================================  =================================================================    HPI    Patient information was obtained from: patient    Use of Intrepreter: N/A     Mehreen Camara is a 82 year old female with history of HTN, AAA, who presents to the ER with complaints of chest pain, abdominal pain, and body aches.    Patient reported has a history of dementia but is alert and oriented x3 at this time.  She states throughout the day she has been having some chest pain, abdominal pain, and aches throughout her body.    She denies any fevers but states she has had a little bit of a cough.  Denies any shortness of breath, vomiting, diarrhea, dysuria, hematuria.    She states that her abdominal pain is located in the suprapubic region.      REVIEW OF SYSTEMS  Review of Systems   Constitutional: Negative for fever.   Respiratory: Positive for cough. Negative for shortness of breath.    Cardiovascular: Positive for chest pain.   Gastrointestinal: Positive for abdominal pain. Negative for diarrhea, nausea and vomiting.   Genitourinary: Negative for dysuria and urgency.   Musculoskeletal: Negative for back pain and neck pain.         +body aches   Allergic/Immunologic: Negative for immunocompromised state.   All other systems reviewed and are negative.        PAST MEDICAL HISTORY:  Past Medical History:   Diagnosis Date     AAA (abdominal aortic aneurysm) (H)      Abdominal pain, epigastric 12/16/2013     Abnormal computed tomography scan 9/15/2016     Abnormal finding on imaging 9/15/2016     Acute encephalopathy 9/26/2015     Adjustment disorder with anxious mood 9/17/2018     Adjustment disorder with mixed anxiety and depressed mood 9/17/2018     Adrenal adenoma     stable, thought not to be hormonally active     Adrenal adenoma      Agitation      Anemia 11/18/2010     Aneurysm of abdominal vessel (H) 2/25/2008    Created by [employer] Annotation: Jun 8 2011  8:07AM - Mirna Ortizica: 4.4 on 11/2013.   Needs 6-12 month u/s or CT.  Replacement Utility updated for latest IMO load  Overview:  2/08 Abd US: 5.4 cm. 3/08 CT Abd: 2.7 x 3.3. 9/08:  MRI Abd: 2.7X3.2  Next 9/09.     Anxiety state 2/26/2008     Arthritis      Asymptomatic postmenopausal status     Created by Conversion  Replacement Utility updated for latest IMO load     Back pain 5/16/2020     Harris esophagus      Harris's esophagus 1/11/2012    Created by [employer] Annotation: Feb  3 2012  8:32Cameron Tejada: Needs EGD 2/2017   Overview:  Overview:  Created by [employer] [facility] Annotation: Feb  3 2012  8:32Cameron Tejada: Needs EGD 2/2017     Benign neoplasm of ascending colon 9/19/2016     Bilateral knee pain 8/5/2020     Bloating 5/5/2017     Bradycardia 9/17/2018     Cataract 9/17/2018    Created by Conversion   Overview:  Overview:  Created by Conversion     Chest wall pain 1/20/2013     Chronic low back pain 9/29/2016     Cognitive and behavioral changes      Compression fracture of lumbar vertebra (H) 10/10/2020     Constipation      Contact dermatitis and eczema 11/22/2006     DDD (degenerative disc disease), lumbosacral 2/26/2008      Depression      Depression, major 7/13/2009     Depressive disorder      Diaphragmatic hernia 9/18/2018    Created by Conversion  Replacement Utility updated for latest IMO load  Overview:  Overview:  Created by Conversion  Replacement Utility updated for latest IMO load     Dietary counseling and surveillance 9/15/2016     Disorder of bone and cartilage 9/17/2018    Created by Conversion [employer] [facility] Annotation: Dec 13 2012  7:Roberta Padron: vit D/Ca, f/u  Dexa needed 1/2014.  Replacement Utility updated for latest IMO load  Overview:  Overview:  Created by Conversion [employer] [facility] Annotation: Dec 13 2012  7:41Roberta Kelly: vit D/Ca, f/u  Dexa needed 1/2014.  Replacement Utility updated for latest IMO load     Diverticulosis of colon 12/18/2014     Dizziness and giddiness     Created by Conversion      Dysphagia 12/17/2013     Early satiety 12/16/2013     Esophageal reflux     Created by Conversion      Essential hypertension 11/22/2006    Created by Conversion  Replacement Utility updated for latest IMO load  Overview:  Overview:  Created by Conversion  Replacement Utility updated for latest IMO load     Fall 8/5/2020     Flatulence, eructation and gas pain 2/19/2014     Gaseous abdominal distention 9/19/2016     Gastro-esophageal reflux disease with esophagitis 6/6/2017     Generalized muscle weakness 7/4/2018     GERD (gastroesophageal reflux disease)      Hemorrhage of rectum and anus 9/19/2016     Hiatal hernia      HLD (hyperlipidemia)      Hoarseness of voice 4/6/2010     HTN (hypertension)      Hypertension      Hypokalemia 6/29/2007     Hypothyroid      Hypothyroidism 9/22/2009    Created by Conversion  Replacement Utility updated for latest IMO load  Overview:  Overview:  Created by Conversion  Replacement Utility updated for latest IMO load     Insomnia due to anxiety and fear 9/17/2018     Insomnia due to mental condition      Irritable bowel syndrome 11/22/2006     Lower back pain       Major depressive disorder, recurrent episode (H) 9/17/2018    Created by Conversion  Replacement Utility updated for latest IMO load  Overview:  Overview:  Created by Conversion  Replacement Utility updated for latest IMO load     Major depressive disorder, single episode, moderate (H)      Malaise and fatigue 7/10/2007     Metabolic encephalopathy      Migraine with aura      Mixed hyperlipidemia 2/10/2008    Created by Conversion      Mood disorder due to a general medical condition      Movement disorder 9/19/2018     Multiple system atrophy P (H)      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     Murmur      Nonrheumatic aortic valve stenosis 10/23/2020     Nontoxic multinodular goiter 11/22/2006    Overview:  thyroidectomy 7/07 for atypical cells on FNA- benign cysts seen at time of surgical pathology     Obesity, unspecified     Created by Conversion      Orthostatic hypotension dysautonomic syndrome 9/22/2017     Osteoarthritis      Partial small bowel obstruction (H)      Polyp of colon 9/19/2016     Post-op pain 12/12/2018     Postoperative hypothyroidism 1/15/2013    Overview:  7/2/07-  Thyroidectomy by Dr Wang Crenshaw     Prediabetes 10/26/2010     Primary insomnia 7/4/2017     REM sleep behavior disorder 8/5/2020     Retinal migraine 4/28/2014     Rheumatic fever     thought to have had as a child     Rupture of left Achilles tendon, sequela 12/31/2019     S/P AAA repair using bifurcation graft 11/30/2015     S/P laparoscopic cholecystectomy 12/12/2018     Scoliosis      Sleep difficulties      Small bowel obstruction (H)      Thyroid disease      Thyroid nodule     removed nodules and thyroid     Tinnitus     Created by Conversion  Replacement Utility updated for latest IMO load     Undiagnosed cardiac murmurs 11/22/2006     Vitamin D deficiency 1/15/2013     Weak 4/29/2018     Weakness 5/4/2017     Weakness of both lower extremities 7/5/2018         PAST SURGICAL HISTORY:  Past Surgical History:    Procedure Laterality Date     AAA REPAIR  2015    wtih bifurcation graft     CARPAL TUNNEL RELEASE RT/LT Bilateral 2013     HC REVISE MEDIAN N/CARPAL TUNNEL SURG      Description: Neuroplasty Decompression Median Nerve At Carpal Tunnel;  Recorded: 01/21/2013;  Comments: bilateral     HYSTERECTOMY  2013     IR ABDOMINAL ENDOVASCULAR STENT GRAFT  11/30/2015     JOINT REPLACEMENT Right 2003     JOINT REPLACEMENT       LAPAROSCOPIC CHOLECYSTECTOMY N/A 12/12/2018    Procedure: LAPAROSCOPIC CHOLECYSTECTOMY;  Surgeon: Amadeo Sebastian MD;  Location: [facility] OR     ORTHOPEDIC SURGERY       NE THYROIDECTOMY      Description: Thyroid Surgery Total Thyroidectomy;  Recorded: 06/08/2011;     SPINE SURGERY  1981    cervical spine fusion     THYROIDECTOMY  2011     XR MAJOR JOINT OR BURSA INJ/ASP BILATERAL  5/18/2020     XR MAJOR JOINT OR BURSA INJ/ASP UNILATERAL  5/18/2020     ZZC CERV SPINE FUSN,ANTER,BELOW C2      Description: Cervical Vertebral Fusion;  Recorded: 01/21/2013;  Comments: 1981     ZZC TOTAL ABDOM HYSTERECTOMY  1985    Description: Hysterectomy;  Recorded: 01/21/2013;     [facility] TOTAL HIP ARTHROPLASTY Right     Description: Total Hip Replacement;  Recorded: 01/21/2013;  Comments: right, 2003         CURRENT MEDICATIONS:    Prior to Admission medications    Medication Sig Start Date End Date Taking? Authorizing Provider   acetaminophen (TYLENOL) 500 MG tablet Take 2 tablets (1,000 mg) by mouth 3 times daily 1/12/21   Lisa Parry APRN CNP   bisacodyl (DULCOLAX) 10 MG suppository Place 10 mg rectally daily as needed for constipation    Reported, Patient   Buprenorphine HCl (BELBUCA) 75 MCG FILM buccal film Place 1 Film (75 mcg) inside cheek every 12 hours 5/19/22   Jasmin Andrade APRN CNP   carbidopa-levodopa (SINEMET)  MG tablet Take 1 tablet by mouth 3 times daily 7/14/22   Chasidy Lugo DO   diclofenac (VOLTAREN) 1 % topical gel Apply 2 g topically 3 times daily as needed for moderate pain     Unknown, Entered By History   DULoxetine (CYMBALTA) 60 MG capsule Take 60 mg by mouth daily 2/10/22   Unknown, Entered By History   gabapentin (NEURONTIN) 300 MG capsule Take 1 capsule (300 mg) by mouth At Bedtime 5/20/22   Maya Montero MD   gabapentin (NEURONTIN) 300 MG capsule Take 300 mg by mouth 2 times daily AM and 1345 3/18/21   Reported, Patient   hydrALAZINE (APRESOLINE) 25 MG tablet Take 1 tablet (25 mg) by mouth every 8 hours 5/20/22   Maya Montero MD   lactobacillus rhamnosus (GG) (CULTURELL) capsule Take 1 capsule by mouth daily    Unknown, Entered By History   levothyroxine (SYNTHROID/LEVOTHROID) 100 MCG tablet Take 1 tablet (100 mcg) by mouth daily 7/21/20   Cathy Sargent APRN CNP   lidocaine (LIDODERM) 5 % patch Place 1 patch onto the skin every 24 hours To prevent lidocaine toxicity, patient should be patch free for 12 hrs daily.    Unknown, Entered By History   losartan (COZAAR) 50 MG tablet Take 50 mg by mouth 2 times daily    Unknown, Entered By History   naloxone (NARCAN) 4 MG/0.1ML nasal spray Spray 4 mg into one nostril alternating nostrils once as needed for opioid reversal every 2-3 minutes until assistance arrives    Unknown, Entered By History   nystatin (MYCOSTATIN) 312772 UNIT/GM external cream Apply topically 2 times daily APPLY TOPICALLY TO AREA UNDER  right BREAST    Unknown, Entered By History   polyethylene glycol (MIRALAX) 17 g packet Take 17 g by mouth daily as needed for constipation If pt has not had a BM during the day    Unknown, Entered By History   polyethylene glycol (MIRALAX) 17 GM/Dose powder Take 17 g by mouth 2 times daily 7/14/22   Chasidy Lugo,    polyethylene glycol-propylene glycol (SYSTANE ULTRA) 0.4-0.3 % SOLN ophthalmic solution Place 2 drops into both eyes every hour as needed for dry eyes    Unknown, Entered By History   polyethylene glycol-propylene glycol (SYSTANE ULTRA) 0.4-0.3 % SOLN ophthalmic solution Place 2 drops into both eyes 3 times daily  (and additionally as needed/requested)    Reported, Patient   QUEtiapine (SEROQUEL) 25 MG tablet Take 1 tablet (25 mg) by mouth At Bedtime 12/29/20   [facility] APRALLI [facility]   senna-docusate (SENOKOT-S/PERICOLACE) 8.6-50 MG tablet Take 1 tablet by mouth daily    Unknown, Entered By History   senna-docusate (SENOKOT-S/PERICOLACE) 8.6-50 MG tablet Take 1 tablet by mouth 2 times daily as needed for constipation    Unknown, Entered By History   simethicone (MYLICON) 125 MG chewable tablet Take 125 mg by mouth 2 times daily    Reported, Patient   Simethicone 125 MG TABS Take 1 chew tab by mouth 2 times daily as needed bloating    Unknown, Entered By History   sodium phosphate (FLEET ENEMA) 7-19 GM/118ML rectal enema Place 1 enema rectally once as needed for constipation    Unknown, Entered By History   vitamin D3 (CHOLECALCIFEROL) 50 mcg (2000 units) tablet Take 1 tablet (50 mcg) by mouth daily 12/30/20   [facility] Asylum[facility] APRALLI CNP         ALLERGIES:  Allergies   Allergen Reactions     Penicillins Anaphylaxis, Rash and Shortness Of Breath     Aspirin Nausea and GI Disturbance     Atenolol Cough     Atorvastatin Muscle Pain (Myalgia) and Nausea and Vomiting     Bupropion Nausea     Cephalexin Rash     Localized to neck area     Clindamycin Other (See Comments)     Constipation      Codeine Nausea     Ibuprofen Nausea and GI Disturbance     Lovastatin Muscle Pain (Myalgia)         FAMILY HISTORY:  Family History   Problem Relation Age of Onset     Hypertension Mother      Coronary Artery Disease Mother      Coronary Artery Disease Father      Other Cancer Brother      Parkinsonism Other      Heart Disease Mother      Heart Disease Father      Cancer Brother 57.00        colon     Hypertension Daughter      Hypertension Daughter      Neuropathy Daughter      Lupus Daughter      Heart Disease Paternal Aunt      Heart Disease Paternal Uncle      Parkinsonism Paternal Uncle          SOCIAL HISTORY:  Social History  "    Socioeconomic History     Marital status:    Tobacco Use     Smoking status: Former     Packs/day: 0.50     Types: Cigarettes     Quit date: 1994     Years since quittin.9     Smokeless tobacco: Never   Substance and Sexual Activity     Alcohol use: No     Drug use: No     Sexual activity: Not Currently         VITALS:  Patient Vitals for the past 24 hrs:   BP Temp Temp src Pulse Resp SpO2 Height Weight   22 2345 -- -- -- 79 -- -- -- --   22 2330 (!) 180/85 -- -- 80 -- 97 % -- --   22 2315 (!) 192 -- -- 81 -- 95 % -- --   22 230 (!) 192 -- -- 78 -- 96 % -- --   22 (!) 195/ -- -- 80 19 97 % -- --   22 (!) 205/100 -- -- 92 -- 93 % -- --   22 (!) 176/81 -- -- 85 -- -- -- --   22 220 (!) 183/77 -- -- 82 -- 97 % -- --   22 214 (!) 171/80 -- -- 83 -- 98 % -- --   220 (!) 176/77 -- -- 81 -- 97 % -- --   22 (!) 199/89 -- -- 81 -- 97 % -- --   22 (!) 201/84 -- -- 80 -- 97 % -- --   22 204 (!) 212/87 -- -- 82 17 98 % -- --   22 (!) 239/114 -- -- 87 -- 96 % -- --   22 (!) 221/103 -- -- 79 18 97 % -- --   22 (!) 232/110 98.4  F (36.9  C) Temporal 80 24 97 % -- --   22 194 (!) 233/112 -- -- 90 -- 97 % -- --   22 (!) 224/106 -- -- -- -- -- -- --   22 (!) 224/106 -- -- 87 -- 96 % -- --   22 (!) 231/104 -- -- 79 -- 97 % -- --   22 (!) 236/106 -- -- 79 -- 98 % -- --   22 (!) 231/101 -- -- 79 -- 97 % -- --   22 -- -- -- -- -- -- 1.702 m (5' 7\") 65.8 kg (145 lb)       Wt Readings from Last 3 Encounters:   22 65.8 kg (145 lb)   22 68 kg (150 lb)   22 69.9 kg (154 lb 1.6 oz)       Estimated Creatinine Clearance: 58.5 mL/min (based on SCr of 0.77 mg/dL).    PHYSICAL EXAM    Constitutional:  Well developed, Well nourished, NAD, GCS 15  HENT:  Normocephalic, Atraumatic, Bilateral " external ears normal, Nose normal. Neck- Supple, No stridor.   Eyes:  PERRL, EOMI, Conjunctiva normal, No discharge.  Respiratory:  Normal breath sounds, No respiratory distress, No wheezing, Speaks full sentences easily. No cough.   Cardiovascular:  Normal heart rate, Regular rhythm, No rubs, No gallops. Chest wall nontender.   GI:  No excessive obesity.  Bowel sounds normal, Soft, mild lower abd tenderness, No masses, No flank tenderness. No rebound or guarding.   : deferred   Musculoskeletal: 2+ DP pulses. No cyanosis, No clubbing. Good range of motion in all major joints. No tenderness to palpation or major deformities noted.   Integument:  Warm, Dry, No erythema, No rash.  No petechiae.   Neurologic:  Alert & oriented x 3, Normal motor function, Normal sensory function, No focal deficits noted.   Psychiatric:  Affect normal, Cooperative         LAB:  All pertinent labs reviewed and interpreted.  Recent Results (from the past 24 hour(s))   ECG 12-LEAD WITH MUSE ([facility])    Collection Time: 11/07/22  5:44 PM   Result Value Ref Range    Systolic Blood Pressure  mmHg    Diastolic Blood Pressure  mmHg    Ventricular Rate 83 BPM    Atrial Rate 83 BPM    SD Interval 178 ms    QRS Duration 98 ms     ms    QTc 420 ms    P Axis 68 degrees    R AXIS -6 degrees    T Axis 72 degrees    Interpretation ECG       Sinus rhythm  Possible Left atrial enlargement  Left ventricular hypertrophy  Inferior infarct (cited on or before 25-SEP-2015)  Anteroseptal infarct (cited on or before 25-SEP-2015)  Abnormal ECG  When compared with ECG of 20-MAY-2022 14:04,  No significant change was found  Confirmed by SEE ED PROVIDER NOTE FOR, ECG INTERPRETATION ([id number]),  Mariana Vila ([id number]) on 11/7/2022 5:59:17 PM     Extra Blue Top Tube    Collection Time: 11/07/22  6:05 PM   Result Value Ref Range    Hold Specimen JIC    Extra Red Top Tube    Collection Time: 11/07/22  6:05 PM   Result Value Ref Range    Hold Specimen JIC    Extra  Green Top (Lithium Heparin) Tube    Collection Time: 11/07/22  6:05 PM   Result Value Ref Range    Hold Specimen JIC    Extra Purple Top Tube    Collection Time: 11/07/22  6:05 PM   Result Value Ref Range    Hold Specimen JIC    Basic metabolic panel    Collection Time: 11/07/22  6:05 PM   Result Value Ref Range    Sodium 141 136 - 145 mmol/L    Potassium 4.6 3.5 - 5.0 mmol/L    Chloride 101 98 - 107 mmol/L    Carbon Dioxide (CO2) 30 22 - 31 mmol/L    Anion Gap 10 5 - 18 mmol/L    Urea Nitrogen 21 8 - 28 mg/dL    Creatinine 0.77 0.60 - 1.10 mg/dL    Calcium 9.1 8.5 - 10.5 mg/dL    Glucose 99 70 - 125 mg/dL    GFR Estimate 77 >60 mL/min/1.73m2   Troponin I (now)    Collection Time: 11/07/22  6:05 PM   Result Value Ref Range    Troponin I <0.01 0.00 - 0.29 ng/mL   Hepatic function panel    Collection Time: 11/07/22  6:05 PM   Result Value Ref Range    Bilirubin Total 0.4 0.0 - 1.0 mg/dL    Bilirubin Direct 0.1 <=0.5 mg/dL    Protein Total 7.6 6.0 - 8.0 g/dL    Albumin 4.3 3.5 - 5.0 g/dL    Alkaline Phosphatase 60 45 - 120 U/L    AST 13 0 - 40 U/L    ALT <9 0 - 45 U/L   Lipase    Collection Time: 11/07/22  6:05 PM   Result Value Ref Range    Lipase 15 0 - 52 U/L   CK total    Collection Time: 11/07/22  6:05 PM   Result Value Ref Range    CK 36 30 - 190 U/L   CBC with platelets and differential    Collection Time: 11/07/22  6:05 PM   Result Value Ref Range    WBC Count 5.5 4.0 - 11.0 10e3/uL    RBC Count 4.29 3.80 - 5.20 10e6/uL    Hemoglobin 13.6 11.7 - 15.7 g/dL    Hematocrit 43.7 35.0 - 47.0 %     (H) 78 - 100 fL    MCH 31.7 26.5 - 33.0 pg    MCHC 31.1 (L) 31.5 - 36.5 g/dL    RDW 12.5 10.0 - 15.0 %    Platelet Count 198 150 - 450 10e3/uL    % Neutrophils 64 %    % Lymphocytes 26 %    % Monocytes 8 %    % Eosinophils 1 %    % Basophils 1 %    % Immature Granulocytes 0 %    NRBCs per 100 WBC 0 <1 /100    Absolute Neutrophils 3.5 1.6 - 8.3 10e3/uL    Absolute Lymphocytes 1.4 0.8 - 5.3 10e3/uL    Absolute  Monocytes 0.4 0.0 - 1.3 10e3/uL    Absolute Eosinophils 0.0 0.0 - 0.7 10e3/uL    Absolute Basophils 0.0 0.0 - 0.2 10e3/uL    Absolute Immature Granulocytes 0.0 <=0.4 10e3/uL    Absolute NRBCs 0.0 10e3/uL   Symptomatic; Unknown Influenza A/B & SARS-CoV2 (COVID-19) Virus PCR Multiplex Nasopharyngeal    Collection Time: 11/07/22  6:43 PM    Specimen: Nasopharyngeal; Swab   Result Value Ref Range    Influenza A PCR Negative Negative    Influenza B PCR Negative Negative    RSV PCR Negative Negative    SARS CoV2 PCR Negative Negative   Troponin I (now)    Collection Time: 11/07/22  9:06 PM   Result Value Ref Range    Troponin I 0.01 0.00 - 0.29 ng/mL   ECG 12-LEAD WITH MUSE ([facility])    Collection Time: 11/07/22  9:20 PM   Result Value Ref Range    Systolic Blood Pressure  mmHg    Diastolic Blood Pressure  mmHg    Ventricular Rate 79 BPM    Atrial Rate 79 BPM    OK Interval 168 ms    QRS Duration 96 ms     ms    QTc 424 ms    P Axis 75 degrees    R AXIS 6 degrees    T Axis 96 degrees    Interpretation ECG       Sinus rhythm  Possible Left atrial enlargement  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 Please see official radiology report.    Thoracic spine MRI w/o contrast         CTA Chest Abdomen Pelvis w Contrast   Final Result   IMPRESSION:   1.  No acute findings or other explanation for symptoms.         Lumbar spine MRI w/o contrast    (Results Pending)         EKG:    Indication: Chest pain  Performed at: 17:44p  Impression: Sinus rhythm at 83 bpm.  Flipped T waves noted in lead aVL, aVR and V1.  Slow R wave progression.  OK interval 178 ms, QRS  98 ms,  ms.  Nonspecific ST changes compared to May 20, 2022.    Indication: Chest pain  Performed at: 21:20p  Impression: Sinus rhythm at 79 bpm.  Flipped T waves in aVL, aVR.  Slow R wave progression.  WV interval 168 ms, QRS 96, QTc 424 ms.  EKG does appear similar to the 1 done in the ER earlier today.    I have independently reviewed and interpreted the EKG(s) documented above.        PROCEDURES:  none      I, Porfirio Kent, am serving as a scribe to document services personally performed by Dr. Sara Covarrubias based on my observation and the provider's statements to me. I, Dr. Sara Covarrubias MD attest that Porfirio Kent is acting in a scribe capacity, has observed my performance of the services and has documented them in accordance with my direction.        Sara Covarrubias M.D. [facility]  Emergency Medicine and Medical Toxicology  Formerly [facility] EMERGENCY ROOM  [id number] [id number] [phone]  Dept: [phone]           Sara Covarrubias MD  11/08/22 0027

## 2022-11-08 NOTE — CONSULTS
Consult Note  [facility] - Spine Surgery    I was asked to see this patient in consult by Dr. Cabral  Patient Name:   Mehreen Camara  Address:  [address] [place] [address] [address]  [place] [id number]  Age:  82 year old  Sex:   female  Admission Date/Time:   11/7/2022  6:00 PM  Primary Care Provider:  Martinez Figueroa  Informant: patient, daughter    CHIEF COMPLAINT:    LBP    HPI:    This is a 82 year old female that presented to  ED for BL LBP acutely worsening in the past 2 days. H/o fall back in May with compression fracture to T12 vertebral body. She endorses BL LE pain globally, not following any particular dermatomal pattern. Daughter states she has not been very mobile since fall. Pt does not recall any new injury or change in activity. Pt states she usually takes tylenol for pain. Does have h/o taking various narcotic medications for pain but that none of them worked well for her and had uncomfortable side effects. Daughter states patient had previously been seen by a neurologist for LE pain, recommended non-operative treatment and medication management. If symptoms don't improve recommended moving forward with BL LE EMG study. Pt should continue working with neurology on discharge.    Pt denies QUOC symptoms include saddle anesthesia, bowel/bladder incontinence, urinary retention.    PMHx significant for global weakness, multiple system atrophy, OA, scoliosis    PAST MEDICAL HISTORY:  Past Medical History:   Diagnosis Date     AAA (abdominal aortic aneurysm) (H)      Abdominal pain, epigastric 12/16/2013     Abnormal computed tomography scan 9/15/2016     Abnormal finding on imaging 9/15/2016     Acute encephalopathy 9/26/2015     Adjustment disorder with anxious mood 9/17/2018     Adjustment disorder with mixed anxiety and depressed mood 9/17/2018     Adrenal adenoma     stable, thought not to be hormonally active     Adrenal adenoma      Agitation      Anemia 11/18/2010     Aneurysm of abdominal  vessel (H) 2/25/2008    Created by [employer] [facility] Annotation: Jun 8 2011  8:07AM Danni Grigsby: 4.4 on 11/2013.   Needs 6-12 month u/s or CT.  Replacement Utility updated for latest IMO load  Overview:  2/08 Abd US: 5.4 cm. 3/08 CT Abd: 2.7 x 3.3. 9/08:  MRI Abd: 2.7X3.2  Next 9/09.     Anxiety state 2/26/2008     Arthritis      Asymptomatic postmenopausal status     Created by Conversion  Replacement Utility updated for latest IMO load     Back pain 5/16/2020     Harris esophagus      Harris's esophagus 1/11/2012    Created by [employer] [facility] Annotation: Feb  3 2012  8:32Cameron Tejada: Needs EGD 2/2017   Overview:  Overview:  Created by [employer] [facility] Annotation: Feb  3 2012  8:32Cameron Tejada: Needs EGD 2/2017     Benign neoplasm of ascending colon 9/19/2016     Bilateral knee pain 8/5/2020     Bloating 5/5/2017     Bradycardia 9/17/2018     Cataract 9/17/2018    Created by Conversion   Overview:  Overview:  Created by Conversion     Chest wall pain 1/20/2013     Chronic low back pain 9/29/2016     Cognitive and behavioral changes      Compression fracture of lumbar vertebra (H) 10/10/2020     Constipation      Contact dermatitis and eczema 11/22/2006     DDD (degenerative disc disease), lumbosacral 2/26/2008     Depression      Depression, major 7/13/2009     Depressive disorder      Diaphragmatic hernia 9/18/2018    Created by Conversion  Replacement Utility updated for latest IMO load  Overview:  Overview:  Created by Conversion  Replacement Utility updated for latest IMO load     Dietary counseling and surveillance 9/15/2016     Disorder of bone and cartilage 9/17/2018    Created by [employer] [facility] Annotation: Dec 13 2012  7:41Roberta Kelly: vit D/Ca, f/u  Dexa needed 1/2014.  Replacement Utility updated for latest IMO load  Overview:  Overview:  Created by [employer] [facility] Annotation: Dec 13 2012  7:41Roberta Kelly: vit D/Ca, f/u  Dexa needed  1/2014.  Replacement Utility updated for latest IMO load     Diverticulosis of colon 12/18/2014     Dizziness and giddiness     Created by Conversion      Dysphagia 12/17/2013     Early satiety 12/16/2013     Esophageal reflux     Created by Conversion      Essential hypertension 11/22/2006    Created by Conversion  Replacement Utility updated for latest IMO load  Overview:  Overview:  Created by Conversion  Replacement Utility updated for latest IMO load     Fall 8/5/2020     Flatulence, eructation and gas pain 2/19/2014     Gaseous abdominal distention 9/19/2016     Gastro-esophageal reflux disease with esophagitis 6/6/2017     Generalized muscle weakness 7/4/2018     GERD (gastroesophageal reflux disease)      Hemorrhage of rectum and anus 9/19/2016     Hiatal hernia      HLD (hyperlipidemia)      Hoarseness of voice 4/6/2010     HTN (hypertension)      Hypertension      Hypokalemia 6/29/2007     Hypothyroid      Hypothyroidism 9/22/2009    Created by Conversion  Replacement Utility updated for latest IMO load  Overview:  Overview:  Created by Conversion  Replacement Utility updated for latest IMO load     Insomnia due to anxiety and fear 9/17/2018     Insomnia due to mental condition      Irritable bowel syndrome 11/22/2006     Lower back pain      Major depressive disorder, recurrent episode (H) 9/17/2018    Created by Conversion  Replacement Utility updated for latest IMO load  Overview:  Overview:  Created by Conversion  Replacement Utility updated for latest IMO load     Major depressive disorder, single episode, moderate (H)      Malaise and fatigue 7/10/2007     Metabolic encephalopathy      Migraine with aura      Mixed hyperlipidemia 2/10/2008    Created by Conversion      Mood disorder due to a general medical condition      Movement disorder 9/19/2018     Multiple system atrophy P (H)      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     Murmur      Nonrheumatic aortic valve stenosis 10/23/2020      Nontoxic multinodular goiter 11/22/2006    Overview:  thyroidectomy 7/07 for atypical cells on FNA- benign cysts seen at time of surgical pathology     Obesity, unspecified     Created by Conversion      Orthostatic hypotension dysautonomic syndrome 9/22/2017     Osteoarthritis      Partial small bowel obstruction (H)      Polyp of colon 9/19/2016     Post-op pain 12/12/2018     Postoperative hypothyroidism 1/15/2013    Overview:  7/2/07-  Thyroidectomy by Dr Wang Crenshaw     Prediabetes 10/26/2010     Primary insomnia 7/4/2017     REM sleep behavior disorder 8/5/2020     Retinal migraine 4/28/2014     Rheumatic fever     thought to have had as a child     Rupture of left Achilles tendon, sequela 12/31/2019     S/P AAA repair using bifurcation graft 11/30/2015     S/P laparoscopic cholecystectomy 12/12/2018     Scoliosis      Sleep difficulties      Small bowel obstruction (H)      Thyroid disease      Thyroid nodule     removed nodules and thyroid     Tinnitus     Created by Conversion  Replacement Utility updated for latest IMO load     Undiagnosed cardiac murmurs 11/22/2006     Vitamin D deficiency 1/15/2013     Weak 4/29/2018     Weakness 5/4/2017     Weakness of both lower extremities 7/5/2018       PAST SURGICAL HISTORY:  Past Surgical History:   Procedure Laterality Date     AAA REPAIR  2015    [facility] bifurcation graft     CARPAL TUNNEL RELEASE RT/LT Bilateral 2013     HC REVISE MEDIAN N/CARPAL TUNNEL SURG      Description: Neuroplasty Decompression Median Nerve At Carpal Tunnel;  Recorded: 01/21/2013;  Comments: bilateral     HYSTERECTOMY  2013     IR ABDOMINAL ENDOVASCULAR STENT GRAFT  11/30/2015     JOINT REPLACEMENT Right 2003     JOINT REPLACEMENT       LAPAROSCOPIC CHOLECYSTECTOMY N/A 12/12/2018    Procedure: LAPAROSCOPIC CHOLECYSTECTOMY;  Surgeon: Amadeo Sebastian MD;  Location: [facility] OR     ORTHOPEDIC SURGERY       PA THYROIDECTOMY      Description: Thyroid Surgery Total Thyroidectomy;  Recorded: 06/08/2011;      SPINE SURGERY  1981    cervical spine fusion     THYROIDECTOMY  2011     XR MAJOR JOINT OR BURSA INJ/ASP BILATERAL  5/18/2020     XR MAJOR JOINT OR BURSA INJ/ASP UNILATERAL  5/18/2020     [facility] CERV SPINE FUSN,ANTER,BELOW C2      Description: Cervical Vertebral Fusion;  Recorded: 01/21/2013;  Comments: 1981     [facility] TOTAL ABDOM HYSTERECTOMY  1985    Description: Hysterectomy;  Recorded: 01/21/2013;     [facility] TOTAL HIP ARTHROPLASTY Right     Description: Total Hip Replacement;  Recorded: 01/21/2013;  Comments: right, 2003       PRIOR TO ADMISSION MEDICATIONS:  Prior to Admission medications    Medication Sig Last Dose Taking? Auth Provider Long Term End Date   acetaminophen (TYLENOL) 500 MG tablet Take 2 tablets (1,000 mg) by mouth 3 times daily 11/7/2022 Yes Lisa Parry APRN CNP     bisacodyl (DULCOLAX) 10 MG suppository Place 10 mg rectally daily as needed for constipation Unknown Yes Reported, Patient     carbidopa-levodopa (SINEMET)  MG tablet Take 1 tablet by mouth 3 times daily 11/7/2022 at 1300 x2 Yes Chasidy Lugo DO Yes    diclofenac (VOLTAREN) 1 % topical gel Apply 2 g topically 3 times daily as needed for moderate pain Unknown Yes Unknown, Entered By History No    DULoxetine (CYMBALTA) 60 MG capsule Take 60 mg by mouth daily 11/7/2022 at 0700 Yes Unknown, Entered By History No    gabapentin (NEURONTIN) 300 MG capsule Take 1 capsule (300 mg) by mouth At Bedtime 11/6/2022 at 2000 Yes Maya Montero MD Yes    gabapentin (NEURONTIN) 300 MG capsule Take 300 mg by mouth 2 times daily AM and 1345 11/7/2022 Yes Reported, Patient Yes    hydrALAZINE (APRESOLINE) 10 MG tablet Take 10 mg by mouth 3 times daily 11/7/2022 Yes Unknown, Entered By History No    lactobacillus rhamnosus (GG) (CULTURELL) capsule Take 1 capsule by mouth daily 11/7/2022 at 0700 Yes Unknown, Entered By History     levothyroxine (SYNTHROID/LEVOTHROID) 100 MCG tablet Take 1 tablet (100 mcg) by mouth daily 11/7/2022 at 0700 Yes  Cathy Sargent APRN CNP Yes    lidocaine (LIDODERM) 5 % patch Place 1 patch onto the skin every 24 hours To prevent lidocaine toxicity, patient should be patch free for 12 hrs daily. 11/7/2022 at 0700 Yes Unknown, Entered By History     losartan (COZAAR) 50 MG tablet Take 50 mg by mouth 2 times daily 11/7/2022 at 0700 Yes Unknown, Entered By History No    naloxone (NARCAN) 4 MG/0.1ML nasal spray Spray 4 mg into one nostril alternating nostrils once as needed for opioid reversal every 2-3 minutes until assistance arrives Unknown Yes Unknown, Entered By History     nystatin (MYCOSTATIN) 602517 UNIT/GM external cream Apply topically 2 times daily APPLY TOPICALLY TO AREA UNDER  right BREAST 11/7/2022 at 0700 Yes Unknown, Entered By History     oxyCODONE (ROXICODONE) 5 MG tablet Take 2.5 mg by mouth 3 times daily 11/7/2022 at 1345 Yes Unknown, Entered By History     oxyCODONE (ROXICODONE) 5 MG tablet Take 2.5 mg by mouth every 6 hours as needed for severe pain Unknown Yes Unknown, Entered By History     polyethylene glycol (MIRALAX) 17 GM/Dose powder Take 17 g by mouth 2 times daily 11/7/2022 at 0700 Yes Chasidy Lugo,      polyethylene glycol-propylene glycol (SYSTANE ULTRA) 0.4-0.3 % SOLN ophthalmic solution Place 2 drops into both eyes every hour as needed for dry eyes Unknown Yes Unknown, Entered By History     polyethylene glycol-propylene glycol (SYSTANE ULTRA) 0.4-0.3 % SOLN ophthalmic solution Place 2 drops into both eyes 3 times daily (and additionally as needed/requested) 11/7/2022 at 1345 Yes Reported, Patient     QUEtiapine (SEROQUEL) 25 MG tablet Take 1 tablet (25 mg) by mouth At Bedtime 11/6/2022 Yes Baljit Yates APRN CNP Yes    senna-docusate (SENOKOT-S/PERICOLACE) 8.6-50 MG tablet Take 1 tablet by mouth daily 11/7/2022 at 0700 Yes Unknown, Entered By History     senna-docusate (SENOKOT-S/PERICOLACE) 8.6-50 MG tablet Take 1 tablet by mouth 2 times daily as needed for constipation Unknown Yes  Unknown, Entered By History     simethicone (MYLICON) 125 MG chewable tablet Take 125 mg by mouth 2 times daily 2022 at 0700 Yes Reported, Patient     Simethicone 125 MG TABS Take 1 chew tab by mouth 2 times daily as needed bloating Unknown Yes Unknown, Entered By History     sodium phosphate (FLEET ENEMA) 7-19 GM/118ML rectal enema Place 1 enema rectally once as needed for constipation Unknown Yes Unknown, Entered By History     vitamin D3 (CHOLECALCIFEROL) 50 mcg (2000 units) tablet Take 1 tablet (50 mcg) by mouth daily 2022 at 0700 Yes Kube, Afanwi Lumsina, APRN CNP           ALLERGIES:  Penicillins, Aspirin, Atenolol, Atorvastatin, Bupropion, Cephalexin, Clindamycin, Codeine, Ibuprofen, and Lovastatin      FAMILY HISTORY:  Family History   Problem Relation Age of Onset     Hypertension Mother      Coronary Artery Disease Mother      Coronary Artery Disease Father      Other Cancer Brother      Parkinsonism Other      Heart Disease Mother      Heart Disease Father      Cancer Brother 57.00        colon     Hypertension Daughter      Hypertension Daughter      Neuropathy Daughter      Lupus Daughter      Heart Disease Paternal Aunt      Heart Disease Paternal Uncle      Parkinsonism Paternal Uncle         SOCIAL HISTORY:  Social History     Socioeconomic History     Marital status:      Spouse name: Not on file     Number of children: Not on file     Years of education: Not on file     Highest education level: Not on file   Occupational History     Not on file   Tobacco Use     Smoking status: Former     Packs/day: 0.50     Types: Cigarettes     Quit date: 1994     Years since quittin.9     Smokeless tobacco: Never   Substance and Sexual Activity     Alcohol use: No     Drug use: No     Sexual activity: Not Currently   Other Topics Concern     Parent/sibling w/ CABG, MI or angioplasty before 65F 55M? Not Asked   Social History Narrative     Not on file     Social Determinants of Health  "    Financial Resource Strain: Not on file   Food Insecurity: Not on file   Transportation Needs: Not on file   Physical Activity: Not on file   Stress: Not on file   Social Connections: Not on file   Intimate Partner Violence: Not on file   Housing Stability: Not on file        REVIEW OF SYSTEMS:  A complete 13 point ROS was obtained and is negative with the exception of the above HPI.      PHYSICAL EXAM:  BP (!) 175/86 (BP Location: Right arm)   Pulse 85   Temp 97.7  F (36.5  C) (Oral)   Resp 16   Ht 1.702 m (5' 7\")   Wt 65.8 kg (145 lb)   SpO2 95%   BMI 22.71 kg/m     A&O x 3    General appearance: Awake, alert. Not completely oriented.    Breathing non-labored  Neck soft and supple    Extremities: Distal Pulses are palpable, no edema    Midline lumbar spine moderately TTP  Paraspinal muscles BL moderately TTP    RLE: 4+/5 Iliopsoas, 5/5 Quadriceps, 5/5 Hamstrings, 5/5 Tibialis Anterior, 5/5 Extensor Hallucis Longus, 5/5 Gastroc Soleus  LLE: 4+/5 Iliopsoas, 5/5 Quadriceps, 5/5 Hamstrings, 5/5 Tibialis Anterior, 5/5 Extensor Hallucis Longus, 5/5 Gastroc Soleus    Sensation is intact to light touch in L3-S1 nerve roots bilaterally    Patella reflexes 2/4 and Symmetric  Achilles reflexes 2/4 and Symmetric    Dorsalis pedis pulses 2+ and Symmetric        IMAGING:  EXAM: MR THORACIC SPINE W/O CONTRAST  LOCATION: [facility]  DATE/TIME: 11/8/2022 7:58 AM     INDICATION: Increasing back pain, leg pain, and leg weakness  COMPARISON: CT thoracic spine 05/16/2022  TECHNIQUE: Routine Thoracic Spine MRI without IV contrast. Due to patient discomfort axial imaging of the lower thoracic spine and the requested lumbar spine MRI could not be completed.     FINDINGS:   Multiple sequences are degraded by patient motion. 12 rib bearing thoracic type vertebra. Midthoracic dextrocurvature centered at T8-T9. 3 mm degenerative type anterolisthesis at C7-T1. No additional subluxation identified. Superior " endplate compression   fracture at T12 with progressive vertebral height loss at this level compared to the prior CT. There is now marked central vertebral height loss at T12 focally. 2 mm retropulsion of the posterior superior T12 vertebral margin. Vertebral body heights   elsewhere are maintained. Trace residual osseous edema involving the superior endplate of T12. A large field-of-view localizer sequence demonstrates additional moderate inferiorly compression fracture of L3 and mild to moderate superior endplate   compression fractures at L4 and L5 similar to the previous exam. Mild loss of disc height and signal on the left along the concavity of the spinal curvature from T7 through T10. Slight annular bulging at these levels. No significant focal disc herniation   identified. Multilevel thoracic facet arthropathy, greatest at the cervicothoracic junction and on the left along the concavity of the mid-lower thoracic curvature. No central spinal canal stenosis. Mild left neural foraminal stenosis at multiple levels   from T7 through T10. Mild bilateral neural foraminal stenosis at T11-T12.      No definite extraspinal abnormality is visualized.                                                                      IMPRESSION:  1.  Axial imaging of the lower thoracic spine and requested lumbar spine MRI were not completed due to patient discomfort.  2.  Marked central compression deformity of T12 with progressive vertebral height loss at this level compared to 05/16/2022. This progression is favored to be chronic in nature; however, there is minor edema of the superior endplate at T12 and more   recent subacute progression of the compression deformity is possible.  3.   images demonstrate multiple additional compression fractures from L3 through L5 similar to findings on the 05/16/2022 CT exam.  4.  Multilevel thoracic spondylosis without high-grade central spinal canal stenosis. Neural foraminal stenosis  greatest on the left along the concavity of the midthoracic dextrocurvature from T7 through T10.      LABS:  Results for orders placed or performed during the hospital encounter of 11/07/22 (from the past 24 hour(s))   ECG 12-LEAD WITH MUSE (LHE)   Result Value Ref Range    Systolic Blood Pressure  mmHg    Diastolic Blood Pressure  mmHg    Ventricular Rate 83 BPM    Atrial Rate 83 BPM    NH Interval 178 ms    QRS Duration 98 ms     ms    QTc 420 ms    P Axis 68 degrees    R AXIS -6 degrees    T Axis 72 degrees    Interpretation ECG       Sinus rhythm  Possible Left atrial enlargement  Left ventricular hypertrophy  Inferior infarct (cited on or before 25-SEP-2015)  Anteroseptal infarct (cited on or before 25-SEP-2015)  Abnormal ECG  When compared with ECG of 20-MAY-2022 14:04,  No significant change was found  Confirmed by SEE ED PROVIDER NOTE FOR, ECG INTERPRETATION ([id number]),  Mariana Vila ([id number]) on 11/7/2022 5:59:17 PM     [place] Draw    Narrative    The following orders were created for panel order [place] Draw.  Procedure                               Abnormality         Status                     ---------                               -----------         ------                     Extra Blue Top Tube[[id number]]                              Final result               Extra Red Top Tube[[id number]]                               Final result               Extra Green Top (Lithium...[[id number]]                      Final result               Extra Purple Top Tube[[id number]]                            Final result                 Please view results for these tests on the individual orders.   Extra Blue Top Tube   Result Value Ref Range    Hold Specimen JIC    Extra Red Top Tube   Result Value Ref Range    Hold Specimen JIC    Extra Green Top (Lithium Heparin) Tube   Result Value Ref Range    Hold Specimen JIC    Extra Purple Top Tube   Result Value Ref Range    Hold Specimen JIC    CBC with platelets +  differential    Narrative    The following orders were created for panel order CBC with platelets + differential.  Procedure                               Abnormality         Status                     ---------                               -----------         ------                     CBC with platelets and d...[[id number]]  Abnormal            Final result                 Please view results for these tests on the individual orders.   Basic metabolic panel   Result Value Ref Range    Sodium 141 136 - 145 mmol/L    Potassium 4.6 3.5 - 5.0 mmol/L    Chloride 101 98 - 107 mmol/L    Carbon Dioxide (CO2) 30 22 - 31 mmol/L    Anion Gap 10 5 - 18 mmol/L    Urea Nitrogen 21 8 - 28 mg/dL    Creatinine 0.77 0.60 - 1.10 mg/dL    Calcium 9.1 8.5 - 10.5 mg/dL    Glucose 99 70 - 125 mg/dL    GFR Estimate 77 >60 mL/min/1.73m2   Troponin I (now)   Result Value Ref Range    Troponin I <0.01 0.00 - 0.29 ng/mL   Hepatic function panel   Result Value Ref Range    Bilirubin Total 0.4 0.0 - 1.0 mg/dL    Bilirubin Direct 0.1 <=0.5 mg/dL    Protein Total 7.6 6.0 - 8.0 g/dL    Albumin 4.3 3.5 - 5.0 g/dL    Alkaline Phosphatase 60 45 - 120 U/L    AST 13 0 - 40 U/L    ALT <9 0 - 45 U/L   Lipase   Result Value Ref Range    Lipase 15 0 - 52 U/L   CK total   Result Value Ref Range    CK 36 30 - 190 U/L   CBC with platelets and differential   Result Value Ref Range    WBC Count 5.5 4.0 - 11.0 10e3/uL    RBC Count 4.29 3.80 - 5.20 10e6/uL    Hemoglobin 13.6 11.7 - 15.7 g/dL    Hematocrit 43.7 35.0 - 47.0 %     (H) 78 - 100 fL    MCH 31.7 26.5 - 33.0 pg    MCHC 31.1 (L) 31.5 - 36.5 g/dL    RDW 12.5 10.0 - 15.0 %    Platelet Count 198 150 - 450 10e3/uL    % Neutrophils 64 %    % Lymphocytes 26 %    % Monocytes 8 %    % Eosinophils 1 %    % Basophils 1 %    % Immature Granulocytes 0 %    NRBCs per 100 WBC 0 <1 /100    Absolute Neutrophils 3.5 1.6 - 8.3 10e3/uL    Absolute Lymphocytes 1.4 0.8 - 5.3 10e3/uL    Absolute Monocytes 0.4 0.0 - 1.3  10e3/uL    Absolute Eosinophils 0.0 0.0 - 0.7 10e3/uL    Absolute Basophils 0.0 0.0 - 0.2 10e3/uL    Absolute Immature Granulocytes 0.0 <=0.4 10e3/uL    Absolute NRBCs 0.0 10e3/uL   Symptomatic; Unknown Influenza A/B & SARS-CoV2 (COVID-19) Virus PCR Multiplex Nasopharyngeal    Specimen: Nasopharyngeal; Swab   Result Value Ref Range    Influenza A PCR Negative Negative    Influenza B PCR Negative Negative    RSV PCR Negative Negative    SARS CoV2 PCR Negative Negative    Narrative    Testing was performed using the Xpert Xpress CoV2/Flu/RSV Assay on the Cepheid GeneXpert Instrument. This test should be ordered for the detection of SARS-CoV-2 and influenza viruses in individuals who meet clinical and/or epidemiological criteria. Test performance is unknown in asymptomatic patients. This test is for in vitro diagnostic use under the FDA EUA for laboratories certified under CLIA to perform high or moderate complexity testing. This test has not been FDA cleared or approved. A negative result does not rule out the presence of PCR inhibitors in the specimen or target RNA in concentration below the limit of detection for the assay. If only one viral target is positive but coinfection with multiple targets is suspected, the sample should be re-tested with another FDA cleared, approved, or authorized test, if coinfection would change clinical management. This test was validated by the [facility]. These laboratories are certified under the Clinical Laboratory Improvement Amendments of 1988 (CLIA-88) as qualified to perform high complexity laboratory testing.   CTA Chest Abdomen Pelvis w Contrast    Narrative    EXAM: CTA CHEST ABDOMEN PELVIS W CONTRAST  LOCATION: [facility]  DATE/TIME: 11/7/2022 8:19 PM    INDICATION: chest and abd pain  COMPARISON: 05/16/2022  TECHNIQUE: CT angiogram chest abdomen pelvis during arterial phase of injection of IV contrast. 2D and 3D MIP  reconstructions were performed by the CT technologist. Dose reduction techniques were used.   CONTRAST: 100ml Isovue 370    FINDINGS:   CT ANGIOGRAM CHEST, ABDOMEN, AND PELVIS: Mild calcified and noncalcified plaque within the thoracic aorta. No aneurysm or dissection. Patent aortobiiliac stent. Variant celiac axis anatomy with direct origin of the common hepatic artery from the aorta.   Visceral branches are patent.    LUNGS AND PLEURA: Normal.    MEDIASTINUM/AXILLAE: No lymphadenopathy. No central pulmonary emboli.    CORONARY ARTERY CALCIFICATION: Mild.    HEPATOBILIARY: Gallbladder surgically absent. Normal liver and bile ducts.    PANCREAS: Normal.    SPLEEN: Normal.    ADRENAL GLANDS: Unchanged benign left adrenal adenoma. Normal right adrenal.    KIDNEYS/BLADDER: No significant mass, stone, or hydronephrosis. Bladder is completely obscured by streak artifact.    BOWEL: No obstruction or inflammatory change.    LYMPH NODES: Normal.    PELVIC ORGANS: No pelvic masses.    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 Osteopenia and multilevel degenerative changes. Unchanged chronic compression fractures of T12 and L4 with S-shaped curvature of the spine. Healed bilateral   pubic rami and sacral fractures.      Impression    IMPRESSION:  1.  No acute findings or other explanation for symptoms.     Troponin I (now)   Result Value Ref Range    Troponin I 0.01 0.00 - 0.29 ng/mL   ECG 12-LEAD WITH MUSE (LHE)   Result Value Ref Range    Systolic Blood Pressure  mmHg    Diastolic Blood Pressure  mmHg    Ventricular Rate 79 BPM    Atrial Rate 79 BPM    VA Interval 168 ms    QRS Duration 96 ms     ms    QTc 424 ms    P Axis 75 degrees    R AXIS 6 degrees    T Axis 96 degrees    Interpretation ECG       Sinus rhythm  Possible Left atrial enlargement  Inferior infarct (cited on or before 25-SEP-2015)  Anterior infarct (cited on or before 25-SEP-2015)  Abnormal ECG  When  compared with ECG of 07-NOV-2022 17:44,  T wave inversion more evident in Lateral leads  Confirmed by SEE ED PROVIDER NOTE FOR, ECG INTERPRETATION ([id number]),  Mariana Vila ([id number]) on 11/7/2022 9:27:25 PM     Troponin I   Result Value Ref Range    Troponin I 0.02 0.00 - 0.29 ng/mL   UA with Microscopic reflex to Culture    Specimen: Urine, Clean Catch   Result Value Ref Range    Color Urine Light Yellow Colorless, Straw, Light Yellow, Yellow    Appearance Urine Clear Clear    Glucose Urine Negative Negative mg/dL    Bilirubin Urine Negative Negative    Ketones Urine Trace (A) Negative mg/dL    Specific Gravity Urine >1.050 (H) 1.001 - 1.030    Blood Urine Negative Negative    pH Urine 7.5 (H) 5.0 - 7.0    Protein Albumin Urine Negative Negative mg/dL    Urobilinogen Urine <2.0 <2.0 mg/dL    Nitrite Urine Negative Negative    Leukocyte Esterase Urine Negative Negative    RBC Urine 0 <=2 /HPF    WBC Urine 0 <=5 /HPF    Squamous Epithelials Urine <1 <=1 /HPF    Narrative    Urine Culture not indicated   Troponin I   Result Value Ref Range    Troponin I 0.01 0.00 - 0.29 ng/mL   Extra Tube    Narrative    The following orders were created for panel order Extra Tube.  Procedure                               Abnormality         Status                     ---------                               -----------         ------                     Extra Purple Top Tube[[id number]]                            Final result                 Please view results for these tests on the individual orders.   Extra Purple Top Tube   Result Value Ref Range    Hold Specimen [facility]    Thoracic spine MRI w/o contrast    Narrative    EXAM: MR THORACIC SPINE W/O CONTRAST  LOCATION: [facility]  DATE/TIME: 11/8/2022 7:58 AM    INDICATION: Increasing back pain, leg pain, and leg weakness  COMPARISON: CT thoracic spine 05/16/2022  TECHNIQUE: Routine Thoracic Spine MRI without IV contrast. Due to patient discomfort axial  imaging of the lower thoracic spine and the requested lumbar spine MRI could not be completed.    FINDINGS:   Multiple sequences are degraded by patient motion. 12 rib bearing thoracic type vertebra. Midthoracic dextrocurvature centered at T8-T9. 3 mm degenerative type anterolisthesis at C7-T1. No additional subluxation identified. Superior endplate compression   fracture at T12 with progressive vertebral height loss at this level compared to the prior CT. There is now marked central vertebral height loss at T12 focally. 2 mm retropulsion of the posterior superior T12 vertebral margin. Vertebral body heights   elsewhere are maintained. Trace residual osseous edema involving the superior endplate of T12. A large field-of-view localizer sequence demonstrates additional moderate inferiorly compression fracture of L3 and mild to moderate superior endplate   compression fractures at L4 and L5 similar to the previous exam. Mild loss of disc height and signal on the left along the concavity of the spinal curvature from T7 through T10. Slight annular bulging at these levels. No significant focal disc herniation   identified. Multilevel thoracic facet arthropathy, greatest at the cervicothoracic junction and on the left along the concavity of the mid-lower thoracic curvature. No central spinal canal stenosis. Mild left neural foraminal stenosis at multiple levels   from T7 through T10. Mild bilateral neural foraminal stenosis at T11-T12.     No definite extraspinal abnormality is visualized.      Impression    IMPRESSION:  1.  Axial imaging of the lower thoracic spine and requested lumbar spine MRI were not completed due to patient discomfort.  2.  Marked central compression deformity of T12 with progressive vertebral height loss at this level compared to 05/16/2022. This progression is favored to be chronic in nature; however, there is minor edema of the superior endplate at T12 and more   recent subacute progression of the  compression deformity is possible.  3.   images demonstrate multiple additional compression fractures from L3 through L5 similar to findings on the 05/16/2022 CT exam.  4.  Multilevel thoracic spondylosis without high-grade central spinal canal stenosis. Neural foraminal stenosis greatest on the left along the concavity of the midthoracic dextrocurvature from T7 through T10.     *Note: Due to a large number of results and/or encounters for the requested time period, some results have not been displayed. A complete set of results can be found in Results Review.        ASSESSMENT:    Active Problems:    * No active hospital problems. *    BL LBP  T12 compression fracture (worsened since May)    PLAN: Patient presentation, imaging, and plan discussed with Dr. Vargas  1. Continue current pain management regimen. Will add nasal calcitonin.  2. Will place pain mgt consult for further recommendations. Pt is hoping to find non-narcotic regimen that works for her. Daughter would appreciate this consult as well.  3. Discussed TLSO, pt utilized one previously. Pt can bring this one to hospital to use, but I will place order for one just in case. Pt can wear brace for improved stability, pain management, and fracture healing. TLSO important due to fx progression.  4. Pt can move forward with PT/OT prn  5. Pt should follow-up with [facility] non-operative spine physician in 1-2 weeks after discharge for further treatment and repeat x-rays.  6. Pt should also follow-up with PCP for osteoporosis work-up after discharge.    Thank you for consulting [facility]. Will sign off. Please re-consult for further orthopedic concerns.    30 minutes    Charity Herman PA-C  [facility]   [phone]   November 8, 2022 at 1:55 PM

## 2022-11-08 NOTE — PROGRESS NOTES
PRIMARY DIAGNOSIS: ACUTE PAIN  OUTPATIENT/OBSERVATION GOALS TO BE MET BEFORE DISCHARGE:  1. Pain Status: Improved-controlled with oral pain medications.    2. Return to near baseline physical activity: No    3. Cleared for discharge by consultants (if involved): No    Discharge Planner Nurse   Safe discharge environment identified: No  Barriers to discharge: Yes       Entered by: Jaleel Llanes RN 11/08/2022 3:03 PM    PT/OT Eval, YESSY Brace  Please review provider order for any additional goals.   Nurse to notify provider when observation goals have been met and patient is ready for discharge.

## 2022-11-09 ENCOUNTER — DOCUMENTATION ONLY (OUTPATIENT)
Dept: ORTHOPEDICS | Facility: CLINIC | Age: 82
End: 2022-11-09

## 2022-11-09 ENCOUNTER — APPOINTMENT (OUTPATIENT)
Dept: PHYSICAL THERAPY | Facility: CLINIC | Age: 82
End: 2022-11-09
Payer: COMMERCIAL

## 2022-11-09 VITALS
HEART RATE: 98 BPM | OXYGEN SATURATION: 94 % | SYSTOLIC BLOOD PRESSURE: 111 MMHG | WEIGHT: 139.77 LBS | BODY MASS INDEX: 22.46 KG/M2 | RESPIRATION RATE: 18 BRPM | TEMPERATURE: 98.4 F | DIASTOLIC BLOOD PRESSURE: 74 MMHG | HEIGHT: 66 IN

## 2022-11-09 PROCEDURE — G0378 HOSPITAL OBSERVATION PER HR: HCPCS

## 2022-11-09 PROCEDURE — 250N000013 HC RX MED GY IP 250 OP 250 PS 637: Performed by: INTERNAL MEDICINE

## 2022-11-09 PROCEDURE — 250N000013 HC RX MED GY IP 250 OP 250 PS 637: Performed by: NURSE PRACTITIONER

## 2022-11-09 PROCEDURE — 96376 TX/PRO/DX INJ SAME DRUG ADON: CPT

## 2022-11-09 PROCEDURE — 250N000013 HC RX MED GY IP 250 OP 250 PS 637: Performed by: HOSPITALIST

## 2022-11-09 PROCEDURE — 250N000011 HC RX IP 250 OP 636: Performed by: INTERNAL MEDICINE

## 2022-11-09 PROCEDURE — 96375 TX/PRO/DX INJ NEW DRUG ADDON: CPT

## 2022-11-09 PROCEDURE — 97161 PT EVAL LOW COMPLEX 20 MIN: CPT | Mod: GP

## 2022-11-09 PROCEDURE — 97116 GAIT TRAINING THERAPY: CPT | Mod: GP

## 2022-11-09 PROCEDURE — 99217 PR OBSERVATION CARE DISCHARGE: CPT | Performed by: FAMILY MEDICINE

## 2022-11-09 RX ORDER — CALCITONIN SALMON 200 [IU]/.09ML
1 SPRAY, METERED NASAL DAILY
Qty: 0.7 ML | Refills: 0
Start: 2022-11-10 | End: 2023-01-01

## 2022-11-09 RX ORDER — METHOCARBAMOL 500 MG/1
250 TABLET, FILM COATED ORAL 3 TIMES DAILY
Qty: 21 TABLET | Refills: 0 | Status: SHIPPED | OUTPATIENT
Start: 2022-11-09 | End: 2022-11-23

## 2022-11-09 RX ORDER — OXYCODONE HYDROCHLORIDE 5 MG/1
5 TABLET ORAL 3 TIMES DAILY
Qty: 15 TABLET | Refills: 0 | Status: ON HOLD | OUTPATIENT
Start: 2022-11-09 | End: 2023-01-01

## 2022-11-09 RX ADMIN — METHOCARBAMOL TABLETS 250 MG: 500 TABLET, COATED ORAL at 15:35

## 2022-11-09 RX ADMIN — HYDRALAZINE HYDROCHLORIDE 10 MG: 10 TABLET, FILM COATED ORAL at 14:31

## 2022-11-09 RX ADMIN — HYDRALAZINE HYDROCHLORIDE 10 MG: 10 TABLET, FILM COATED ORAL at 09:30

## 2022-11-09 RX ADMIN — LEVOTHYROXINE SODIUM 100 MCG: 100 TABLET ORAL at 09:30

## 2022-11-09 RX ADMIN — CARBIDOPA AND LEVODOPA 1 TABLET: 25; 100 TABLET ORAL at 10:58

## 2022-11-09 RX ADMIN — LIDOCAINE 1 PATCH: 246 PATCH TOPICAL at 09:36

## 2022-11-09 RX ADMIN — POLYETHYLENE GLYCOL AND PROPYLENE GLYCOL 2 DROP: 4; 3 SOLUTION/ DROPS OPHTHALMIC at 10:58

## 2022-11-09 RX ADMIN — LOSARTAN POTASSIUM 50 MG: 50 TABLET, FILM COATED ORAL at 09:30

## 2022-11-09 RX ADMIN — ACETAMINOPHEN 1000 MG: 500 TABLET ORAL at 09:29

## 2022-11-09 RX ADMIN — HYDRALAZINE HYDROCHLORIDE 10 MG: 20 INJECTION, SOLUTION INTRAMUSCULAR; INTRAVENOUS at 03:48

## 2022-11-09 RX ADMIN — OXYCODONE HYDROCHLORIDE 5 MG: 5 TABLET ORAL at 14:31

## 2022-11-09 RX ADMIN — METHOCARBAMOL TABLETS 250 MG: 500 TABLET, COATED ORAL at 10:57

## 2022-11-09 RX ADMIN — CALCITONIN SALMON 1 SPRAY: 200 SPRAY, METERED NASAL at 09:37

## 2022-11-09 RX ADMIN — OXYCODONE HYDROCHLORIDE 5 MG: 5 TABLET ORAL at 09:29

## 2022-11-09 RX ADMIN — Medication 1 CAPSULE: at 09:29

## 2022-11-09 RX ADMIN — GABAPENTIN 300 MG: 300 CAPSULE ORAL at 09:29

## 2022-11-09 RX ADMIN — DULOXETINE 60 MG: 30 CAPSULE, DELAYED RELEASE ORAL at 09:29

## 2022-11-09 RX ADMIN — ACETAMINOPHEN 1000 MG: 500 TABLET ORAL at 14:31

## 2022-11-09 RX ADMIN — POLYETHYLENE GLYCOL 3350 17 G: 17 POWDER, FOR SOLUTION ORAL at 09:36

## 2022-11-09 RX ADMIN — GABAPENTIN 300 MG: 300 CAPSULE ORAL at 14:31

## 2022-11-09 RX ADMIN — SIMETHICONE 125 MG: 125 TABLET, CHEWABLE ORAL at 10:57

## 2022-11-09 RX ADMIN — CARBIDOPA AND LEVODOPA 1 TABLET: 25; 100 TABLET ORAL at 15:35

## 2022-11-09 RX ADMIN — POLYETHYLENE GLYCOL AND PROPYLENE GLYCOL 2 DROP: 4; 3 SOLUTION/ DROPS OPHTHALMIC at 14:34

## 2022-11-09 ASSESSMENT — ACTIVITIES OF DAILY LIVING (ADL)
ADLS_ACUITY_SCORE: 45

## 2022-11-09 NOTE — PROGRESS NOTES
S: Pt. seen at [facility] Rm 306. Female. Virgil WHITFIELD RX: TLSO custom. DX: T12 compression Fx..  O:A: Pt. seen and measured supine. Today I C/M for a Custom Clam shell TLSO to be made by Spinal tech. TLSO to provide triplanar control of the Spine.  P: Pt. to be seen tomorrow for F/D.    Romaine RIVERA,LO

## 2022-11-09 NOTE — PROGRESS NOTES
11/09/22 1304   Appointment Info   Signing Clinician's Name / Credentials (PT) Amadeo Witt   Quick Adds   Quick Adds Certification   Living Environment   People in Home alone   Current Living Arrangements assisted living;other (see comments)  (memory care)   Home Accessibility no concerns   Transportation Anticipated family or friend will provide   Self-Care   Usual Activity Tolerance moderate   Current Activity Tolerance fair   Equipment Currently Used at Home walker, rolling   Fall history within last six months yes   Number of times patient has fallen within last six months 1   Activity/Exercise/Self-Care Comment Per CM note she gets assist with all mobility activities.  Patient states that they are slow at times and then she will get up by self to go to bathroom   General Information   Onset of Illness/Injury or Date of Surgery 11/07/22   Patient/Family Therapy Goals Statement (PT) none stated   Pertinent History of Current Problem (include personal factors and/or comorbidities that impact the POC) chest pain, chronic pain   Existing Precautions/Restrictions spinal;brace worn when out of bed;other (see comments)  (per patient son, patient has not been wearing TLSO that she has)   Weight-Bearing Status - LUE weight-bearing as tolerated   Cognition   Affect/Mental Status (Cognition) confused   Orientation Status (Cognition) oriented to;person   Follows Commands (Cognition) follows one-step commands   Range of Motion (ROM)   Range of Motion ROM is WFL   Strength (Manual Muscle Testing)   Strength Comments general weakness   Bed Mobility   Bed Mobility sit-supine;rolling left   Rolling Left Duncan (Bed Mobility) not tested   Sit-Supine Duncan (Bed Mobility) minimum assist (75% patient effort);dependent (less than 25% patient effort);verbal cues   Bed Mobility Limitations decreased ability to use arms for pushing/pulling;decreased ability to use legs for bridging/pushing;cognitive deficits;impaired  ability to control trunk for mobility   Impairments Contributing to Impaired Bed Mobility pain;decreased strength   Assistive Device (Bed Mobility) bed rails   Comment, (Bed Mobility) modified supine to sit , not able to push up with L UE   Transfers   Transfers sit-stand transfer   Sit-Stand Transfer   Sit-Stand Glenn (Transfers) verbal cues;supervision;minimum assist (75% patient effort)   Assistive Device (Sit-Stand Transfers) walker, front-wheeled   Comment, (Sit-Stand Transfer) normally stands from raised surfaces   Gait/Stairs (Locomotion)   Glenn Level (Gait) contact guard   Assistive Device (Gait) walker, front-wheeled   Distance in Feet (Required for LE Total Joints) 20   Distance in Feet (Gait) 140   Pattern (Gait) step-through   Deviations/Abnormal Patterns (Gait) gait speed decreased;valeria decreased   Balance   Balance no deficits were identified   Sensory Examination   Sensory Perception patient reports no sensory changes   Coordination   Coordination no deficits were identified   Muscle Tone   Muscle Tone no deficits were identified   Clinical Impression   Criteria for Skilled Therapeutic Intervention Yes, treatment indicated   PT Diagnosis (PT) impaired functional mobility   Influenced by the following impairments weakness, pain   Functional limitations due to impairments transfers, gait   Clinical Presentation (PT Evaluation Complexity) Stable/Uncomplicated   Clinical Presentation Rationale Pt. presents as medically diagnosed   Clinical Decision Making (Complexity) low complexity   Planned Therapy Interventions (PT) home exercise program   Anticipated Equipment Needs at Discharge (PT) walker, rolling   Risk & Benefits of therapy have been explained care plan/treatment goals reviewed;risks/benefits reviewed;participants included;patient;son   Clinical Impression Comments Paient at baseline for functional mobility   PT Total Evaluation Time   PT Eval, Low Complexity Minutes (27980) 10    Therapy Certification   Start of care date 11/09/22   Certification date from 11/09/22   Certification date to 12/09/22   Physical Therapy Goals   PT Frequency Daily   PT Predicted Duration/Target Date for Goal Attainment 11/16/22   PT Goals Transfers;Gait;Bed Mobility;PT Goal 1   PT: Bed Mobility Supervision/stand-by assist;Supine to/from sit   PT: Transfers Supervision/stand-by assist;Sit to/from stand;Within precautions   PT: Gait Supervision/stand-by assist;100 feet;Rolling walker;Within precautions   Interventions   Interventions Quick Adds Gait Training;Therapeutic Activity   Gait Training   Gait Training Minutes (19994) 10   Symptoms Noted During/After Treatment (Gait Training) fatigue   Treatment Detail/Skilled Intervention vc for sit to stand/safety.   Paitent not safe to stand or ambulate unless being supervised due to patient mentation.  Once ambulating gait was stable and relatively steady.  Patient demonstrates poor judgement in which way to turn to sit down, how to stand up etc   Posey Level (Gait Training) contact guard   Physical Assistance Level (Gait Training) 1 person assist;supervision;verbal cues   Weight Bearing (Gait Training) weight-bearing as tolerated   Assistive Device (Gait Training) rolling walker   Pattern Analysis (Gait Training) swing-through gait   Gait Analysis Deviations decreased step length;decreased toe-to-floor clearance;decreased valeria   Impairments (Gait Analysis/Training) pain;strength decreased   PT Discharge Planning   PT Plan progres with gait/transfers   PT Discharge Recommendation (DC Rec) home with assist  (Memory care/assisted living)   PT Rationale for DC Rec Patient at baseline for functional mobiity Encouraged to start wearing TLSO brace that she has at home already   PT Brief overview of current status Patient is min to contact guard for mobility   [facility]  OUTPATIENT PHYSICAL THERAPY EVALUATION  PLAN OF TREATMENT FOR  OUTPATIENT REHABILITATION  (COMPLETE FOR INITIAL CLAIMS ONLY)  Patient's Last Name, First Name, M.I.  YOB: 1940  Mehreen Camara                        Provider's Name  [facility] Medical Record No.  [id number]                             Onset Date:  11/07/22   Start of Care Date:  (P) 11/09/22   Type:     _X_PT   ___OT   ___SLP Medical Diagnosis:                 PT Diagnosis:  (P) impaired functional mobility Visits from SOC:  1     See note for plan of treatment, functional goals and certification details    I CERTIFY THE NEED FOR THESE SERVICES FURNISHED UNDER        THIS PLAN OF TREATMENT AND WHILE UNDER MY CARE     (Physician co-signature of this document indicates review and certification of the therapy plan).

## 2022-11-09 NOTE — PROGRESS NOTES
PRIMARY DIAGNOSIS: ACUTE PAIN  OUTPATIENT/OBSERVATION GOALS TO BE MET BEFORE DISCHARGE:  1. Pain Status: Improved-controlled with oral pain medications.     2. Return to near baseline physical activity: No     3. Cleared for discharge by consultants (if involved): No        Discharge Planner Nurse      Safe discharge environment identified: No  Barriers to discharge: Yes - needs PT/OT eval; TLSO brace.       Entered by: Charleen Yost RN 11/08/2022       Please review provider order for any additional goals.   Nurse to notify provider when observation goals have been met and patient is ready for discharge.

## 2022-11-09 NOTE — PLAN OF CARE
Goal Outcome Evaluation:    Patient arrived from [facility]. Disoriented to time ,place, and situation. Elevated BP improved after PO hydralazine scheduled earlier today, elevated again- awaiting new IV access to administer PRN IV hydralazine. Removed left IV due to confusion. Pain team consult today, Orthotics custom measured patient for TLSO brace. Repositioned to promote skin integrity, call light in reach. Purposeful rounding performed. Alarms utilized to promote patient safety, intermittently agitated and refusing medications. Jaleel Llanes RN      Problem: Orthopaedic Fracture  Goal: Optimal Pain Control and Function  Outcome: Progressing     Problem: Pain Chronic (Persistent) (Comorbidity Management)  Goal: Acceptable Pain Control and Functional Ability  Outcome: Progressing     Problem: Orthopaedic Fracture  Goal: Effective Tissue Perfusion  Outcome: Progressing

## 2022-11-09 NOTE — PLAN OF CARE
Goal Outcome Evaluation: ongoing.       Plan of Care Reviewed With: patient    Overall Patient Progress: no changeOverall Patient Progress: no change     Pt disoriented- pain in hip on R side. BP elevated- prn hydralazine given this am. Repositions/weight shifting. Prn oxy given x1 as well for pain. Will continue to monitor.     PRIMARY DIAGNOSIS: ACUTE PAIN  OUTPATIENT/OBSERVATION GOALS TO BE MET BEFORE DISCHARGE:  1. Pain Status: Improved-controlled with oral pain medications.     2. Return to near baseline physical activity: No     3. Cleared for discharge by consultants (if involved): No

## 2022-11-09 NOTE — PROGRESS NOTES
Care Management Follow Up    Length of Stay (days): 0    Expected Discharge Date: 11/09/2022     Concerns to be Addressed: discharge planning  Lives in memory care    Anticipated Discharge Disposition: Assisted Living     Anticipated Discharge Services: Other (see comment) (Pending clinical progress)  Anticipated Discharge DME: Other (see comment) (Pending clinical progress)      Additional Information:  MICHELLE called the [facility] x5 times and reached voicemail.   MICHELLE called and spoke with dtr Alysa and learned that the Pt may be able to go today. Due to dtr only having a truck she feels that the Pt may not be able to get in as needed and requests a transport ride. Dtr states understanding that it could be out of pocket. MICHELLE spoke with RN who states that the Pt can go via WC. Ride set for 5PM via transport. MICHELLE updated MICHELLE Zeng who assist with calling dtr and [facility] about ride time and discharge orders.       NAM Munroe

## 2022-11-09 NOTE — PROGRESS NOTES
Occupational Therapy: Orders received, chart reviewed. Pt is not appropriate for skilled OT services at this time due to getting assist from all ADLs/IADLs from memory care facility.  Will defer to physical therapy for discharge disposition.  Plan was discussed with  and PT.  Will complete current OT orders. Please re-order if necessary. Thank you.    11/9/2022 by Angelina Slaughter, OTR, OTR/L

## 2022-11-09 NOTE — PROGRESS NOTES
[facility] ACUTE PAIN SERVICE    Daily PAIN Progress Note    Assessment/Plan:  Mehreen Camara is a 82 year old female who was admitted on 11/7/2022.  Pain team was asked to see the patient for back pain, fracture pain, non opioid pain plan. Admitted for chest pain, abdomen pain, body aches. History of HTN, AA. The pain is reported to be chronic, located in the neck, shoulders, mid-low back, legs, feet. Current pain is rated at 4-7/10 and goal is 0/10.  Patient seen at bedside, daughter at bedside. Patient reported has a history of dementia but is alert and oriented x3 at this time.  During this visit she reports pain in left shoulder, neck, low back, both thighs, feet.     Opioid Induced Respiratory Depression Risk Assessment: high ?    Patient seen with RN in room. Reporting pain worse while sitting up in chair this morning, located in lower back and radiates down thighs, constant, improved with pain medication - no sedation present, needing redirection on time of day, unsure baseline. No changes to pain meds at this time.      PLAN:   1) Pain is consistent with multiple potential pain generators including compression fracture, facet arthritis, stenosis. Severe degenerative changes throughout the thoracic and lumbar spine. Flare in mid low back pain after prior diagnosis of compression fracture T12 in May 2022. MRI shows progression of the T12 compression fracture though the degree of edema/STIR signal hyperintensity is not Suggestive of acute change in the degree of compression. Labs and imaging indicated: I have personally reviewed pertinent labs, tests, and radiologic imaging in patient's chart. Treatment plan includes multimodal pain approach, Hospital Medicine Service for medical management, PT, OT. Patient educated regarding multimodal pain approach, medications as listed below. Patient is understanding of the plan. All questions and concerns addressed to patient's satisfaction.   2)Multimodal  Medication Therapy  Topical: lidocaine patch  NSAID'S: none, could consider voltaren gel   Muscle Relaxants: add Robaxin 250 mg tid   Adjuvants: calcitonin, Gabapentin 300 mg tid,  APAP  Antidepressants/anxiolytics: Seroquel 25 mg at bedtime, cymbalta daily   Opioids: Oxycodone 2.5 mg q6h prn and 5 mg tid   IV Pain medication: none  3)Non-medication interventions: brace, PT, OT  Acupuncture consult - offered and declined  Integrative consult - offered and declined  4)Constipation Prophylaxis: Scheduled and prn      -Opioid prescriber has been: most recent: Dede Torrez [facility] Zamorano  -[place]  pulled from system on 11/8/22. This indicates   10/13/22 Oxycodone 5 mg #90 - same on 8/29/22 7/27/22 Oxycodone 5 mg #105 - same on 6/2/22 5/5/2022 Belbuca 150 mcg film #28  4/14/2022 Bel[place]uca 75 mcg film #60  4/1/2022 gabapentin 300 mg #112  3/9/2022 Belbuca 75 mcg film #60  Has had fills for oxycodone 5 mg tablets in June, July, September, November 2021  Discharge Recommendations         Isma Barfield, PharmD, BCPS, CPE  Acute Care Pain Management Program  [facility] ([place], [place], [place])  Monday-Friday 8a-4p   Page via online paging system or call [phone]

## 2022-11-09 NOTE — CONFIDENTIAL NOTE
11/9/22  S: Pt. seen at [facility] Rm 306. Female. Virgil FREEDMAN. RX: TLSO custom. DX: T12 compression Fx..  O:A: Pt. fit  with custom TLSO made by [employer]. Today I fit  the brace and no adjustments were needed because the custom nature of brace. Pt was given verbal and written information on donning, doffing, care, and warranty of the brace. Brace supplied enough pressure to relieve pt's fracture to allow proper healing.   P: Pt to follow up as needed.

## 2022-11-09 NOTE — DISCHARGE SUMMARY
[facility] MEDICINE  DISCHARGE SUMMARY     Primary Care Physician: Martinez Figueroa  Admission Date: 11/7/2022   Discharge Provider: Andrew Price MD Discharge Date: 11/9/2022   Diet:   Active Diet and Nourishment Order   Procedures     Regular Diet Adult     Diet       Code Status: No CPR- Do NOT Intubate   Activity: DCACTIVITY: Activity as tolerated        Condition at Discharge: Stable     REASON FOR PRESENTATION(See Admission Note for Details)   Chest pain/acute diffuse pain of unclear etiology/chronic pain    PRINCIPAL & ACTIVE DISCHARGE DIAGNOSES     Active Problems:    * No active hospital problems. *      PENDING LABS     Unresulted Labs Ordered in the Past 30 Days of this Admission     No orders found from 10/8/2022 to 11/8/2022.            PROCEDURES ( this hospitalization only)          RECOMMENDATIONS TO OUTPATIENT PROVIDER FOR F/U VISIT     Follow-up Appointments     Follow-up and recommended labs and tests       Follow up with primary care provider, Martinez Figueroa, within 5 days for   hospital follow- up.  The following labs/tests are recommended: Blood   pressure check, general recheck           DISPOSITION     Home    SUMMARY OF HOSPITAL COURSE:    HPI: 82-year-old female presented from memory care unit complaining of chest tightness.  In the ER she was found to have significantly elevated blood pressures and then began to complain of diffuse whole body pain.  She had multiple imaging studies was admitted to the hospital.    Acute on chronic diffuse pain/T12 compression fracture  Patient was seen by pain team and meds adjusted slightly.  Patient was seen by spine and they recommended TLSO and outpatient follow-up.  Felt she had multiple reasons for back pain.  When I am seeing the patient she is actually quite comfortable and feels ready for discharge.  She is going to wear her TLSO for comfort.    Accelerated hypertension/hypertensive  urgency  Initially blood pressures were quite elevated.  Time of discharge blood pressure 111/74.  Patient's is having no chest pain or other complaints.  Close outpatient follow-up.    Chest tightness  Serial troponins were negative.  ECG without any acute findings.  Pain-free.  Outpatient follow-up with primary.    Multiple system atrophy/Lewy body dementia  Back to memory care unit on prehospital meds    Discharge Medications with Med changes:     Current Discharge Medication List      START taking these medications    Details   calcitonin, salmon, (MIACALCIN) 200 UNIT/ACT nasal spray Spray 1 spray into one nostril alternating nostrils daily for 7 days Alternate nostril each day.  Qty: 0.7 mL, Refills: 0    Associated Diagnoses: Closed stable burst fracture of twelfth thoracic vertebra, initial encounter (H)      methocarbamol (ROBAXIN) 500 MG tablet Take 0.5 tablets (250 mg) by mouth 3 times daily for 14 days  Qty: 21 tablet, Refills: 0    Associated Diagnoses: Closed stable burst fracture of twelfth thoracic vertebra, initial encounter (H)         CONTINUE these medications which have CHANGED    Details   !! oxyCODONE (ROXICODONE) 5 MG tablet Take 1 tablet (5 mg) by mouth 3 times daily  Qty: 15 tablet, Refills: 0    Associated Diagnoses: Closed stable burst fracture of twelfth thoracic vertebra, initial encounter (H)       !! - Potential duplicate medications found. Please discuss with provider.      CONTINUE these medications which have NOT CHANGED    Details   acetaminophen (TYLENOL) 500 MG tablet Take 2 tablets (1,000 mg) by mouth 3 times daily    Associated Diagnoses: Multiple closed fractures of pelvis without disruption of pelvic ring, initial encounter (H)      bisacodyl (DULCOLAX) 10 MG suppository Place 10 mg rectally daily as needed for constipation      carbidopa-levodopa (SINEMET)  MG tablet Take 1 tablet by mouth 3 times daily  Qty: 270 tablet, Refills: 3    Associated Diagnoses: Multiple  system atrophy P (H); Slow transit constipation; Chronic left shoulder pain; Irritable bowel syndrome with constipation; Lewy body dementia with behavioral disturbance (H); Movement disorder      diclofenac (VOLTAREN) 1 % topical gel Apply 2 g topically 3 times daily as needed for moderate pain      DULoxetine (CYMBALTA) 60 MG capsule Take 60 mg by mouth daily      !! gabapentin (NEURONTIN) 300 MG capsule Take 1 capsule (300 mg) by mouth At Bedtime  Qty: 30 capsule, Refills: 0    Associated Diagnoses: Lewy body dementia with behavioral disturbance (H)      !! gabapentin (NEURONTIN) 300 MG capsule Take 300 mg by mouth 2 times daily AM and 1345      hydrALAZINE (APRESOLINE) 10 MG tablet Take 10 mg by mouth 3 times daily      lactobacillus rhamnosus (GG) (CULTURELL) capsule Take 1 capsule by mouth daily      levothyroxine (SYNTHROID/LEVOTHROID) 100 MCG tablet Take 1 tablet (100 mcg) by mouth daily  Qty:      Associated Diagnoses: Hypothyroidism, unspecified type      lidocaine (LIDODERM) 5 % patch Place 1 patch onto the skin every 24 hours To prevent lidocaine toxicity, patient should be patch free for 12 hrs daily.      losartan (COZAAR) 50 MG tablet Take 50 mg by mouth 2 times daily      naloxone (NARCAN) 4 MG/0.1ML nasal spray Spray 4 mg into one nostril alternating nostrils once as needed for opioid reversal every 2-3 minutes until assistance arrives      nystatin (MYCOSTATIN) 070412 UNIT/GM external cream Apply topically 2 times daily APPLY TOPICALLY TO AREA UNDER  right BREAST      !! oxyCODONE (ROXICODONE) 5 MG tablet Take 2.5 mg by mouth every 6 hours as needed for severe pain      polyethylene glycol (MIRALAX) 17 GM/Dose powder Take 17 g by mouth 2 times daily  Qty: 1020 g, Refills: 3    Associated Diagnoses: Multiple system atrophy P (H); Slow transit constipation; Chronic left shoulder pain; Irritable bowel syndrome with constipation; Lewy body dementia with behavioral disturbance (H); Movement disorder       !! polyethylene glycol-propylene glycol (SYSTANE ULTRA) 0.4-0.3 % SOLN ophthalmic solution Place 2 drops into both eyes every hour as needed for dry eyes      !! polyethylene glycol-propylene glycol (SYSTANE ULTRA) 0.4-0.3 % SOLN ophthalmic solution Place 2 drops into both eyes 3 times daily (and additionally as needed/requested)      QUEtiapine (SEROQUEL) 25 MG tablet Take 1 tablet (25 mg) by mouth At Bedtime  Qty:      Associated Diagnoses: Insomnia due to anxiety and fear      !! senna-docusate (SENOKOT-S/PERICOLACE) 8.6-50 MG tablet Take 1 tablet by mouth daily      !! senna-docusate (SENOKOT-S/PERICOLACE) 8.6-50 MG tablet Take 1 tablet by mouth 2 times daily as needed for constipation      simethicone (MYLICON) 125 MG chewable tablet Take 125 mg by mouth 2 times daily      Simethicone 125 MG TABS Take 1 chew tab by mouth 2 times daily as needed bloating      sodium phosphate (FLEET ENEMA) 7-19 GM/118ML rectal enema Place 1 enema rectally once as needed for constipation      vitamin D3 (CHOLECALCIFEROL) 50 mcg (2000 units) tablet Take 1 tablet (50 mcg) by mouth daily  Qty:      Associated Diagnoses: Multiple closed fractures of pelvis without disruption of pelvic ring, initial encounter (H)       !! - Potential duplicate medications found. Please discuss with provider.                Rationale for medication changes:      Compression fractures with pain        Consults       PHYSICAL THERAPY ADULT IP CONSULT  OCCUPATIONAL THERAPY ADULT IP CONSULT  NEUROSURGERY IP CONSULT  ORTHOPEDIC SURGERY IP CONSULT  CARE MANAGEMENT / SOCIAL WORK IP CONSULT  PAIN MANAGEMENT ADULT IP CONSULT  ORTHOSIS SPINAL IP CONSULT    Immunizations given this encounter     Most Recent Immunizations   Administered Date(s) Administered     COVID-19,PF,Moderna 02/02/2021     FLU 6-35 months 11/02/2010     FLUAD(HD)65+ QUAD 09/28/2022     Flu, Unspecified 10/21/2021     Influenza (High Dose) 3 valent vaccine 10/03/2019     Influenza (IIV3) PF  11/10/2014     Influenza (intradermal) 09/27/2019     Influenza Vaccine IM > 6 months Valent IIV4 (Alfuria,Fluzone) 09/12/2016     Influenza Vaccine, 6+MO IM (QUADRIVALENT W/PRESERVATIVES) 09/22/2015     Influenza, Quad, High Dose, Pf, 65yr+ (Fluzone HD) 10/02/2020     Pneumo Conj 13-V (2010&after) 09/22/2015     Pneumococcal 23 valent 11/02/2010     TDAP Vaccine (Adacel) 06/08/2011     Tdap (Adult) Unspecified Formulation 06/08/2011     Zoster vaccine, live 06/08/2011           Anticoagulation Information      Recent INR results: No results for input(s): INR in the last 168 hours.  Warfarin doses (if applicable) or name of other anticoagulant:       SIGNIFICANT IMAGING FINDINGS     Results for orders placed or performed during the hospital encounter of 11/07/22   CTA Chest Abdomen Pelvis w Contrast    Impression    IMPRESSION:  1.  No acute findings or other explanation for symptoms.     Thoracic spine MRI w/o contrast    Impression    IMPRESSION:  1.  Axial imaging of the lower thoracic spine and requested lumbar spine MRI were not completed due to patient discomfort.  2.  Marked central compression deformity of T12 with progressive vertebral height loss at this level compared to 05/16/2022. This progression is favored to be chronic in nature; however, there is minor edema of the superior endplate at T12 and more   recent subacute progression of the compression deformity is possible.  3.   images demonstrate multiple additional compression fractures from L3 through L5 similar to findings on the 05/16/2022 CT exam.  4.  Multilevel thoracic spondylosis without high-grade central spinal canal stenosis. Neural foraminal stenosis greatest on the left along the concavity of the midthoracic dextrocurvature from T7 through T10.       SIGNIFICANT LABORATORY FINDINGS     Most Recent 3 CBC's:Recent Labs   Lab Test 11/07/22  1805 05/22/22  0656 05/20/22  1429   WBC 5.5 5.3 4.7   HGB 13.6 12.8 13.7   * 98 101*     178 182     Most Recent 3 BMP's:Recent Labs   Lab Test 11/07/22  1805 05/22/22  0656 05/20/22  1429    144 147*   POTASSIUM 4.6 4.0 4.0   CHLORIDE 101 110* 106   CO2 30 26 31   BUN 21 20 15   CR 0.77 0.70 0.69   ANIONGAP 10 8 10   YADIRA 9.1 8.8 9.3   GLC 99 88 87     Most Recent 2 LFT's:Recent Labs   Lab Test 11/07/22  1805 05/22/22  0656   AST 13 14   ALT <9 <9   ALKPHOS 60 93   BILITOTAL 0.4 0.6     Most Recent 3 INR's:Recent Labs   Lab Test 12/24/20  0127   INR 1.04           Discharge Orders        Follow Up (TCM)    Follow up with Non-operative spine at [facility] within 1-2 weeks for hospital follow- up. No follow up labs or test are needed.   Please call [facility] scheduling to make follow-up appointment: [phone] Mon-Fri, 7:30am - 5pm.   Schedule online at: [email or web address]    Dr. Sam Perez     Reason for your hospital stay    Diffuse pain     Activity    Your activity upon discharge: activity as tolerated     Follow-up and recommended labs and tests     Follow up with primary care provider, Martinez Figueroa, within 5 days for hospital follow- up.  The following labs/tests are recommended: Blood pressure check, general recheck     Diet    Follow this diet upon discharge: Orders Placed This Encounter      Regular Diet Adult       Examination   Physical Exam   Temp:  [97.3  F (36.3  C)-98.6  F (37  C)] 98.4  F (36.9  C)  Pulse:  [80-98] 98  Resp:  [16-20] 18  BP: (106-218)/() 111/74  SpO2:  [91 %-97 %] 94 %  Wt Readings from Last 1 Encounters:   11/09/22 63.4 kg (139 lb 12.4 oz)       General Appearance: No apparent distress  Respiratory: Clear to auscultation bilaterally  Cardiovascular: Regular rate and rhythm  GI: Soft and nontender  Skin: Visualized skin is  Other: Flat affect but good eye contact      Please see EMR for more detailed significant labs, imaging, consultant notes etc.    Andrew ANTUNEZ  MD Albert, personally saw the patient today and spent greater than 30 minutes discharging this patient.    Andrew Price MD  [facility]    CC:Martinez Figueroa

## 2022-11-09 NOTE — PROGRESS NOTES
Care Management Discharge Note    Discharge Date: 11/09/2022       Discharge Disposition: Assisted Living    Discharge Services: Other (see comment) (Pending clinical progress)    Discharge DME: Other (see comment) (Pending clinical progress)    Discharge Transportation: [facility] via wheelchair @ 5PM    Private pay costs discussed: Not applicable    PAS Confirmation Code:    Patient/family educated on Medicare website which has current facility and service quality ratings:      Education Provided on the Discharge Plan:  Per team  Persons Notified of Discharge Plans: Facility, Pt's daughter, bedside RN, and MD.  Patient/Family in Agreement with the Plan: yes    Handoff Referral Completed: Yes    Additional Information:  -3:09 PM  GIRISH spoke with Carisa, Nursing Director at [facility] regarding patient's discharge plan. Carisa asked that discharge orders be faxed over to her as soon as possible.   -3:11 PM GIRISH spoke with patient's daughter, Alysa to inform her of pt's discharge plan and time. Pt's daughter asked that staff let pt know that she will meet her there to help her get settled.       ANGIE Poe, LICSW    11/09/2022   3:30 PM

## 2022-11-10 ENCOUNTER — PATIENT OUTREACH (OUTPATIENT)
Dept: CARE COORDINATION | Facility: CLINIC | Age: 82
End: 2022-11-10

## 2022-11-10 NOTE — PROGRESS NOTES
[facility] Care Resource Center    Background: Transitional Care Management program identified per system criteria and reviewed by [facility] team for possible outreach.    Assessment: Upon chart review, [facility] Team member will not proceed with patient outreach related to this episode of Transitional Care Management program due to reason below:    Patient has discharged to a Memory Care, Long-term Care, Assisted Living or Group Home where patient is receiving on-site support with their daily cares, including support with hospital follow up plan.    Plan: Transitional Care Management episode addressed appropriately per reason noted above.      Ying Pink MA  Connected Care Resource Center, [facility]    *Connected Care Resource Team does NOT follow patient ongoing. Referrals are identified based on internal discharge reports and the outreach is to ensure patient has an understanding of their discharge instructions.

## 2022-11-10 NOTE — PROGRESS NOTES
Physical Therapy Discharge Summary    Reason for therapy discharge:    Discharged to home.    Progress towards therapy goal(s). See goals on Care Plan in [facility] electronic health record for goal details.  Goals met    Therapy recommendation(s):    Continue home exercise program.

## 2022-11-20 NOTE — PATIENT INSTRUCTIONS
Lab Results   Component Value Date    HGBA1C 7 0 (H) 11/01/2022       Recent Labs     11/20/22  1408   POCGLU 126       Blood Sugar Average: Last 72 hrs:  (P) 126     Patient refused insulin on last admission  States that they want to confirm with her PCP prior to starting any kind of diabetes treatment We believe you have a disease called Multiple System Atrophy    This will cause your balance to be poor and your blood pressure control to be difficult to control.    Elevate the head of your bed 4 inches  To keep lying BP < 160/100  Drink fluid 7 cups per day and take a cup of tomato juice to keep BP standing >100 systolic  Check BP daily    Return to Dr. Braga    We will see you once every 6 months to help with following this disease

## 2022-11-21 ENCOUNTER — HEALTH MAINTENANCE LETTER (OUTPATIENT)
Age: 82
End: 2022-11-21

## 2023-01-01 ENCOUNTER — APPOINTMENT (OUTPATIENT)
Dept: RADIOLOGY | Facility: CLINIC | Age: 83
DRG: 481 | End: 2023-01-01
Attending: STUDENT IN AN ORGANIZED HEALTH CARE EDUCATION/TRAINING PROGRAM
Payer: COMMERCIAL

## 2023-01-01 ENCOUNTER — APPOINTMENT (OUTPATIENT)
Dept: CT IMAGING | Facility: CLINIC | Age: 83
DRG: 481 | End: 2023-01-01
Attending: EMERGENCY MEDICINE
Payer: COMMERCIAL

## 2023-01-01 ENCOUNTER — APPOINTMENT (OUTPATIENT)
Dept: PHYSICAL THERAPY | Facility: CLINIC | Age: 83
DRG: 481 | End: 2023-01-01
Payer: COMMERCIAL

## 2023-01-01 ENCOUNTER — OFFICE VISIT (OUTPATIENT)
Dept: NEUROLOGY | Facility: CLINIC | Age: 83
End: 2023-01-01
Payer: COMMERCIAL

## 2023-01-01 ENCOUNTER — ANESTHESIA (OUTPATIENT)
Dept: SURGERY | Facility: CLINIC | Age: 83
DRG: 481 | End: 2023-01-01
Payer: COMMERCIAL

## 2023-01-01 ENCOUNTER — APPOINTMENT (OUTPATIENT)
Dept: RADIOLOGY | Facility: CLINIC | Age: 83
DRG: 481 | End: 2023-01-01
Attending: EMERGENCY MEDICINE
Payer: COMMERCIAL

## 2023-01-01 ENCOUNTER — MYC MEDICAL ADVICE (OUTPATIENT)
Dept: NEUROLOGY | Facility: CLINIC | Age: 83
End: 2023-01-01
Payer: COMMERCIAL

## 2023-01-01 ENCOUNTER — DOCUMENTATION ONLY (OUTPATIENT)
Dept: OTHER | Facility: CLINIC | Age: 83
End: 2023-01-01
Payer: COMMERCIAL

## 2023-01-01 ENCOUNTER — OFFICE VISIT (OUTPATIENT)
Dept: RADIATION ONCOLOGY | Facility: HOSPITAL | Age: 83
End: 2023-01-01
Attending: PSYCHIATRY & NEUROLOGY
Payer: COMMERCIAL

## 2023-01-01 ENCOUNTER — ANESTHESIA EVENT (OUTPATIENT)
Dept: SURGERY | Facility: CLINIC | Age: 83
DRG: 481 | End: 2023-01-01
Payer: COMMERCIAL

## 2023-01-01 ENCOUNTER — LAB REQUISITION (OUTPATIENT)
Dept: LAB | Facility: CLINIC | Age: 83
End: 2023-01-01
Payer: COMMERCIAL

## 2023-01-01 ENCOUNTER — APPOINTMENT (OUTPATIENT)
Dept: RADIOLOGY | Facility: CLINIC | Age: 83
DRG: 481 | End: 2023-01-01
Payer: COMMERCIAL

## 2023-01-01 ENCOUNTER — HOSPITAL ENCOUNTER (INPATIENT)
Facility: CLINIC | Age: 83
LOS: 35 days | Discharge: HOSPICE | DRG: 481 | End: 2023-11-30
Attending: EMERGENCY MEDICINE | Admitting: EMERGENCY MEDICINE
Payer: COMMERCIAL

## 2023-01-01 ENCOUNTER — HEALTH MAINTENANCE LETTER (OUTPATIENT)
Age: 83
End: 2023-01-01

## 2023-01-01 ENCOUNTER — APPOINTMENT (OUTPATIENT)
Dept: CARDIOLOGY | Facility: CLINIC | Age: 83
DRG: 481 | End: 2023-01-01
Attending: EMERGENCY MEDICINE
Payer: COMMERCIAL

## 2023-01-01 VITALS
RESPIRATION RATE: 16 BRPM | HEART RATE: 88 BPM | SYSTOLIC BLOOD PRESSURE: 118 MMHG | OXYGEN SATURATION: 97 % | DIASTOLIC BLOOD PRESSURE: 74 MMHG

## 2023-01-01 VITALS
HEART RATE: 72 BPM | OXYGEN SATURATION: 90 % | WEIGHT: 138.45 LBS | BODY MASS INDEX: 22.25 KG/M2 | SYSTOLIC BLOOD PRESSURE: 139 MMHG | HEIGHT: 66 IN | RESPIRATION RATE: 12 BRPM | TEMPERATURE: 98.5 F | DIASTOLIC BLOOD PRESSURE: 79 MMHG

## 2023-01-01 VITALS
OXYGEN SATURATION: 95 % | DIASTOLIC BLOOD PRESSURE: 78 MMHG | HEART RATE: 77 BPM | BODY MASS INDEX: 22.27 KG/M2 | RESPIRATION RATE: 16 BRPM | SYSTOLIC BLOOD PRESSURE: 145 MMHG | WEIGHT: 138 LBS

## 2023-01-01 DIAGNOSIS — K58.1 IRRITABLE BOWEL SYNDROME WITH CONSTIPATION: ICD-10-CM

## 2023-01-01 DIAGNOSIS — G31.83 LEWY BODY DEMENTIA WITH BEHAVIORAL DISTURBANCE (H): ICD-10-CM

## 2023-01-01 DIAGNOSIS — G89.21 CHRONIC PAIN DUE TO TRAUMA: ICD-10-CM

## 2023-01-01 DIAGNOSIS — G89.29 CHRONIC LEFT SHOULDER PAIN: ICD-10-CM

## 2023-01-01 DIAGNOSIS — F02.818 LEWY BODY DEMENTIA WITH BEHAVIORAL DISTURBANCE (H): ICD-10-CM

## 2023-01-01 DIAGNOSIS — R44.3 HALLUCINATIONS: ICD-10-CM

## 2023-01-01 DIAGNOSIS — K59.01 SLOW TRANSIT CONSTIPATION: ICD-10-CM

## 2023-01-01 DIAGNOSIS — Z51.5 PALLIATIVE CARE PATIENT: Primary | ICD-10-CM

## 2023-01-01 DIAGNOSIS — S72.145A CLOSED NONDISPLACED INTERTROCHANTERIC FRACTURE OF LEFT FEMUR, INITIAL ENCOUNTER (H): Primary | ICD-10-CM

## 2023-01-01 DIAGNOSIS — G25.9 MOVEMENT DISORDER: ICD-10-CM

## 2023-01-01 DIAGNOSIS — R29.6 FALLS FREQUENTLY: ICD-10-CM

## 2023-01-01 DIAGNOSIS — G23.2 MULTIPLE SYSTEM ATROPHY P (H): ICD-10-CM

## 2023-01-01 DIAGNOSIS — R30.0 DYSURIA: ICD-10-CM

## 2023-01-01 DIAGNOSIS — S72.142A DISPLACED INTERTROCHANTERIC FRACTURE OF LEFT FEMUR, INITIAL ENCOUNTER FOR CLOSED FRACTURE (H): ICD-10-CM

## 2023-01-01 DIAGNOSIS — M25.512 CHRONIC LEFT SHOULDER PAIN: ICD-10-CM

## 2023-01-01 DIAGNOSIS — K11.7 SIALORRHEA: ICD-10-CM

## 2023-01-01 DIAGNOSIS — G23.2 MULTIPLE SYSTEM ATROPHY P (H): Primary | ICD-10-CM

## 2023-01-01 DIAGNOSIS — S22.43XA CLOSED FRACTURE OF MULTIPLE RIBS OF BOTH SIDES, INITIAL ENCOUNTER: ICD-10-CM

## 2023-01-01 DIAGNOSIS — Z51.5 HOSPICE CARE: Chronic | ICD-10-CM

## 2023-01-01 LAB
ABO/RH(D): NORMAL
ALBUMIN SERPL BCG-MCNC: 3.8 G/DL (ref 3.5–5.2)
ALBUMIN UR-MCNC: 10 MG/DL
ALBUMIN UR-MCNC: 20 MG/DL
ALP SERPL-CCNC: 59 U/L (ref 35–104)
ALT SERPL W P-5'-P-CCNC: <5 U/L (ref 0–50)
ANION GAP SERPL CALCULATED.3IONS-SCNC: 8 MMOL/L (ref 7–15)
ANION GAP SERPL CALCULATED.3IONS-SCNC: 9 MMOL/L (ref 7–15)
ANTIBODY SCREEN: NEGATIVE
APPEARANCE UR: CLEAR
APPEARANCE UR: CLEAR
AST SERPL W P-5'-P-CCNC: 36 U/L (ref 0–45)
ATRIAL RATE - MUSE: 79 BPM
BACTERIA #/AREA URNS HPF: ABNORMAL /HPF
BACTERIA #/AREA URNS HPF: ABNORMAL /HPF
BACTERIA UR CULT: NORMAL
BACTERIA UR CULT: NORMAL
BASOPHILS # BLD AUTO: 0 10E3/UL (ref 0–0.2)
BASOPHILS # BLD AUTO: 0 10E3/UL (ref 0–0.2)
BASOPHILS NFR BLD AUTO: 0 %
BASOPHILS NFR BLD AUTO: 1 %
BILIRUB SERPL-MCNC: 0.8 MG/DL
BILIRUB UR QL STRIP: NEGATIVE
BILIRUB UR QL STRIP: NEGATIVE
BLD PROD TYP BPU: NORMAL
BLD PROD TYP BPU: NORMAL
BLOOD COMPONENT TYPE: NORMAL
BLOOD COMPONENT TYPE: NORMAL
BUN SERPL-MCNC: 23.9 MG/DL (ref 8–23)
BUN SERPL-MCNC: 24.5 MG/DL (ref 8–23)
BUN SERPL-MCNC: 25.8 MG/DL (ref 8–23)
BUN SERPL-MCNC: 32.3 MG/DL (ref 8–23)
CALCIUM SERPL-MCNC: 7.9 MG/DL (ref 8.8–10.2)
CALCIUM SERPL-MCNC: 8.1 MG/DL (ref 8.8–10.2)
CALCIUM SERPL-MCNC: 8.4 MG/DL (ref 8.8–10.2)
CALCIUM SERPL-MCNC: 8.9 MG/DL (ref 8.8–10.2)
CHLORIDE SERPL-SCNC: 101 MMOL/L (ref 98–107)
CHLORIDE SERPL-SCNC: 104 MMOL/L (ref 98–107)
CHLORIDE SERPL-SCNC: 105 MMOL/L (ref 98–107)
CHLORIDE SERPL-SCNC: 106 MMOL/L (ref 98–107)
CODING SYSTEM: NORMAL
CODING SYSTEM: NORMAL
COLOR UR AUTO: YELLOW
COLOR UR AUTO: YELLOW
CREAT SERPL-MCNC: 0.63 MG/DL (ref 0.51–0.95)
CREAT SERPL-MCNC: 0.71 MG/DL (ref 0.51–0.95)
CREAT SERPL-MCNC: 0.71 MG/DL (ref 0.51–0.95)
CREAT SERPL-MCNC: 0.92 MG/DL (ref 0.51–0.95)
CROSSMATCH: NORMAL
CROSSMATCH: NORMAL
DEPRECATED HCO3 PLAS-SCNC: 26 MMOL/L (ref 22–29)
DEPRECATED HCO3 PLAS-SCNC: 27 MMOL/L (ref 22–29)
DEPRECATED HCO3 PLAS-SCNC: 27 MMOL/L (ref 22–29)
DEPRECATED HCO3 PLAS-SCNC: 30 MMOL/L (ref 22–29)
DIASTOLIC BLOOD PRESSURE - MUSE: 65 MMHG
EGFRCR SERPLBLD CKD-EPI 2021: 61 ML/MIN/1.73M2
EGFRCR SERPLBLD CKD-EPI 2021: 84 ML/MIN/1.73M2
EGFRCR SERPLBLD CKD-EPI 2021: 84 ML/MIN/1.73M2
EGFRCR SERPLBLD CKD-EPI 2021: 88 ML/MIN/1.73M2
EOSINOPHIL # BLD AUTO: 0 10E3/UL (ref 0–0.7)
EOSINOPHIL # BLD AUTO: 0 10E3/UL (ref 0–0.7)
EOSINOPHIL NFR BLD AUTO: 0 %
EOSINOPHIL NFR BLD AUTO: 0 %
ERYTHROCYTE [DISTWIDTH] IN BLOOD BY AUTOMATED COUNT: 12.5 % (ref 10–15)
ERYTHROCYTE [DISTWIDTH] IN BLOOD BY AUTOMATED COUNT: 12.6 % (ref 10–15)
GLUCOSE SERPL-MCNC: 113 MG/DL (ref 70–99)
GLUCOSE SERPL-MCNC: 126 MG/DL (ref 70–99)
GLUCOSE SERPL-MCNC: 87 MG/DL (ref 70–99)
GLUCOSE SERPL-MCNC: 98 MG/DL (ref 70–99)
GLUCOSE SERPL-MCNC: 98 MG/DL (ref 70–99)
GLUCOSE UR STRIP-MCNC: NEGATIVE MG/DL
GLUCOSE UR STRIP-MCNC: NEGATIVE MG/DL
HCT VFR BLD AUTO: 32 % (ref 35–47)
HCT VFR BLD AUTO: 32.3 % (ref 35–47)
HGB BLD-MCNC: 10.3 G/DL (ref 11.7–15.7)
HGB BLD-MCNC: 7.7 G/DL (ref 11.7–15.7)
HGB BLD-MCNC: 7.9 G/DL (ref 11.7–15.7)
HGB BLD-MCNC: 7.9 G/DL (ref 11.7–15.7)
HGB BLD-MCNC: 8 G/DL (ref 11.7–15.7)
HGB BLD-MCNC: 8.2 G/DL (ref 11.7–15.7)
HGB BLD-MCNC: 8.3 G/DL (ref 11.7–15.7)
HGB BLD-MCNC: 8.4 G/DL (ref 11.7–15.7)
HGB BLD-MCNC: 8.4 G/DL (ref 11.7–15.7)
HGB BLD-MCNC: 9.8 G/DL (ref 11.7–15.7)
HGB UR QL STRIP: NEGATIVE
HGB UR QL STRIP: NEGATIVE
HYALINE CASTS: 4 /LPF
IMM GRANULOCYTES # BLD: 0 10E3/UL
IMM GRANULOCYTES # BLD: 0.1 10E3/UL
IMM GRANULOCYTES NFR BLD: 0 %
IMM GRANULOCYTES NFR BLD: 1 %
INR PPP: 1.04 (ref 0.85–1.15)
INTERPRETATION ECG - MUSE: NORMAL
IRON BINDING CAPACITY (ROCHE): 222 UG/DL (ref 240–430)
IRON SATN MFR SERPL: 10 % (ref 15–46)
IRON SERPL-MCNC: 22 UG/DL (ref 37–145)
ISSUE DATE AND TIME: NORMAL
ISSUE DATE AND TIME: NORMAL
KETONES UR STRIP-MCNC: 10 MG/DL
KETONES UR STRIP-MCNC: ABNORMAL MG/DL
LEUKOCYTE ESTERASE UR QL STRIP: ABNORMAL
LEUKOCYTE ESTERASE UR QL STRIP: NEGATIVE
LVEF ECHO: NORMAL
LYMPHOCYTES # BLD AUTO: 0.8 10E3/UL (ref 0.8–5.3)
LYMPHOCYTES # BLD AUTO: 0.8 10E3/UL (ref 0.8–5.3)
LYMPHOCYTES NFR BLD AUTO: 10 %
LYMPHOCYTES NFR BLD AUTO: 9 %
MCH RBC QN AUTO: 31.2 PG (ref 26.5–33)
MCH RBC QN AUTO: 31.4 PG (ref 26.5–33)
MCHC RBC AUTO-ENTMCNC: 30.6 G/DL (ref 31.5–36.5)
MCHC RBC AUTO-ENTMCNC: 31.9 G/DL (ref 31.5–36.5)
MCV RBC AUTO: 102 FL (ref 78–100)
MCV RBC AUTO: 99 FL (ref 78–100)
MONOCYTES # BLD AUTO: 0.5 10E3/UL (ref 0–1.3)
MONOCYTES # BLD AUTO: 0.6 10E3/UL (ref 0–1.3)
MONOCYTES NFR BLD AUTO: 6 %
MONOCYTES NFR BLD AUTO: 6 %
MUCOUS THREADS #/AREA URNS LPF: PRESENT /LPF
MUCOUS THREADS #/AREA URNS LPF: PRESENT /LPF
NEUTROPHILS # BLD AUTO: 6.7 10E3/UL (ref 1.6–8.3)
NEUTROPHILS # BLD AUTO: 7.3 10E3/UL (ref 1.6–8.3)
NEUTROPHILS NFR BLD AUTO: 83 %
NEUTROPHILS NFR BLD AUTO: 84 %
NITRATE UR QL: NEGATIVE
NITRATE UR QL: NEGATIVE
NRBC # BLD AUTO: 0 10E3/UL
NRBC # BLD AUTO: 0 10E3/UL
NRBC BLD AUTO-RTO: 0 /100
NRBC BLD AUTO-RTO: 0 /100
P AXIS - MUSE: 84 DEGREES
PH UR STRIP: 6 [PH] (ref 5–7)
PH UR STRIP: 6.5 [PH] (ref 5–7)
PLATELET # BLD AUTO: 151 10E3/UL (ref 150–450)
PLATELET # BLD AUTO: 182 10E3/UL (ref 150–450)
POTASSIUM SERPL-SCNC: 4 MMOL/L (ref 3.4–5.3)
POTASSIUM SERPL-SCNC: 4.2 MMOL/L (ref 3.4–5.3)
POTASSIUM SERPL-SCNC: 4.4 MMOL/L (ref 3.4–5.3)
POTASSIUM SERPL-SCNC: 4.7 MMOL/L (ref 3.4–5.3)
PR INTERVAL - MUSE: 152 MS
PROT SERPL-MCNC: 6.3 G/DL (ref 6.4–8.3)
QRS DURATION - MUSE: 106 MS
QT - MUSE: 388 MS
QTC - MUSE: 444 MS
R AXIS - MUSE: 29 DEGREES
RBC # BLD AUTO: 3.14 10E6/UL (ref 3.8–5.2)
RBC # BLD AUTO: 3.28 10E6/UL (ref 3.8–5.2)
RBC URINE: 1 /HPF
RBC URINE: 20 /HPF
SODIUM SERPL-SCNC: 139 MMOL/L (ref 135–145)
SODIUM SERPL-SCNC: 140 MMOL/L (ref 135–145)
SODIUM SERPL-SCNC: 140 MMOL/L (ref 135–145)
SODIUM SERPL-SCNC: 142 MMOL/L (ref 135–145)
SP GR UR STRIP: 1.02 (ref 1–1.03)
SP GR UR STRIP: 1.02 (ref 1–1.03)
SPECIMEN EXPIRATION DATE: NORMAL
SQUAMOUS EPITHELIAL: 5 /HPF
SYSTOLIC BLOOD PRESSURE - MUSE: 149 MMHG
T AXIS - MUSE: 100 DEGREES
UNIT ABO/RH: NORMAL
UNIT ABO/RH: NORMAL
UNIT NUMBER: NORMAL
UNIT NUMBER: NORMAL
UNIT STATUS: NORMAL
UNIT STATUS: NORMAL
UNIT TYPE ISBT: 7300
UNIT TYPE ISBT: 7300
UROBILINOGEN UR STRIP-MCNC: 2 MG/DL
UROBILINOGEN UR STRIP-MCNC: <2 MG/DL
VENTRICULAR RATE- MUSE: 79 BPM
WBC # BLD AUTO: 8.1 10E3/UL (ref 4–11)
WBC # BLD AUTO: 8.8 10E3/UL (ref 4–11)
WBC CLUMPS #/AREA URNS HPF: PRESENT /HPF
WBC URINE: 3 /HPF
WBC URINE: 60 /HPF

## 2023-01-01 PROCEDURE — 272N000001 HC OR GENERAL SUPPLY STERILE: Performed by: STUDENT IN AN ORGANIZED HEALTH CARE EDUCATION/TRAINING PROGRAM

## 2023-01-01 PROCEDURE — 250N000013 HC RX MED GY IP 250 OP 250 PS 637: Performed by: NURSE PRACTITIONER

## 2023-01-01 PROCEDURE — 250N000013 HC RX MED GY IP 250 OP 250 PS 637: Performed by: CLINICAL NURSE SPECIALIST

## 2023-01-01 PROCEDURE — 250N000011 HC RX IP 250 OP 636

## 2023-01-01 PROCEDURE — 250N000013 HC RX MED GY IP 250 OP 250 PS 637: Performed by: STUDENT IN AN ORGANIZED HEALTH CARE EDUCATION/TRAINING PROGRAM

## 2023-01-01 PROCEDURE — 99232 SBSQ HOSP IP/OBS MODERATE 35: CPT | Performed by: CLINICAL NURSE SPECIALIST

## 2023-01-01 PROCEDURE — 80048 BASIC METABOLIC PNL TOTAL CA: CPT | Performed by: EMERGENCY MEDICINE

## 2023-01-01 PROCEDURE — 85018 HEMOGLOBIN: CPT | Performed by: EMERGENCY MEDICINE

## 2023-01-01 PROCEDURE — 99231 SBSQ HOSP IP/OBS SF/LOW 25: CPT | Performed by: INTERNAL MEDICINE

## 2023-01-01 PROCEDURE — 250N000013 HC RX MED GY IP 250 OP 250 PS 637: Performed by: EMERGENCY MEDICINE

## 2023-01-01 PROCEDURE — 71260 CT THORAX DX C+: CPT

## 2023-01-01 PROCEDURE — 36415 COLL VENOUS BLD VENIPUNCTURE: CPT | Performed by: EMERGENCY MEDICINE

## 2023-01-01 PROCEDURE — 99417 PROLNG OP E/M EACH 15 MIN: CPT | Performed by: FAMILY MEDICINE

## 2023-01-01 PROCEDURE — 99232 SBSQ HOSP IP/OBS MODERATE 35: CPT | Performed by: STUDENT IN AN ORGANIZED HEALTH CARE EDUCATION/TRAINING PROGRAM

## 2023-01-01 PROCEDURE — 36415 COLL VENOUS BLD VENIPUNCTURE: CPT | Performed by: STUDENT IN AN ORGANIZED HEALTH CARE EDUCATION/TRAINING PROGRAM

## 2023-01-01 PROCEDURE — 120N000001 HC R&B MED SURG/OB

## 2023-01-01 PROCEDURE — 250N000009 HC RX 250: Performed by: NURSE ANESTHETIST, CERTIFIED REGISTERED

## 2023-01-01 PROCEDURE — 73590 X-RAY EXAM OF LOWER LEG: CPT | Mod: LT

## 2023-01-01 PROCEDURE — 81001 URINALYSIS AUTO W/SCOPE: CPT | Performed by: NURSE PRACTITIONER

## 2023-01-01 PROCEDURE — 85025 COMPLETE CBC W/AUTO DIFF WBC: CPT

## 2023-01-01 PROCEDURE — 250N000013 HC RX MED GY IP 250 OP 250 PS 637: Performed by: HOSPITALIST

## 2023-01-01 PROCEDURE — 85025 COMPLETE CBC W/AUTO DIFF WBC: CPT | Performed by: EMERGENCY MEDICINE

## 2023-01-01 PROCEDURE — 999N000141 HC STATISTIC PRE-PROCEDURE NURSING ASSESSMENT: Performed by: STUDENT IN AN ORGANIZED HEALTH CARE EDUCATION/TRAINING PROGRAM

## 2023-01-01 PROCEDURE — 99231 SBSQ HOSP IP/OBS SF/LOW 25: CPT | Performed by: CLINICAL NURSE SPECIALIST

## 2023-01-01 PROCEDURE — C1769 GUIDE WIRE: HCPCS | Performed by: STUDENT IN AN ORGANIZED HEALTH CARE EDUCATION/TRAINING PROGRAM

## 2023-01-01 PROCEDURE — 250N000013 HC RX MED GY IP 250 OP 250 PS 637: Performed by: INTERNAL MEDICINE

## 2023-01-01 PROCEDURE — G0463 HOSPITAL OUTPT CLINIC VISIT: HCPCS | Performed by: FAMILY MEDICINE

## 2023-01-01 PROCEDURE — 81001 URINALYSIS AUTO W/SCOPE: CPT | Performed by: EMERGENCY MEDICINE

## 2023-01-01 PROCEDURE — 99232 SBSQ HOSP IP/OBS MODERATE 35: CPT | Performed by: INTERNAL MEDICINE

## 2023-01-01 PROCEDURE — 99233 SBSQ HOSP IP/OBS HIGH 50: CPT | Performed by: NURSE PRACTITIONER

## 2023-01-01 PROCEDURE — 0QS706Z REPOSITION LEFT UPPER FEMUR WITH INTRAMEDULLARY INTERNAL FIXATION DEVICE, OPEN APPROACH: ICD-10-PCS | Performed by: STUDENT IN AN ORGANIZED HEALTH CARE EDUCATION/TRAINING PROGRAM

## 2023-01-01 PROCEDURE — 99231 SBSQ HOSP IP/OBS SF/LOW 25: CPT | Mod: GC | Performed by: FAMILY MEDICINE

## 2023-01-01 PROCEDURE — 250N000011 HC RX IP 250 OP 636: Mod: JZ

## 2023-01-01 PROCEDURE — 93005 ELECTROCARDIOGRAM TRACING: CPT | Performed by: EMERGENCY MEDICINE

## 2023-01-01 PROCEDURE — 99205 OFFICE O/P NEW HI 60 MIN: CPT | Performed by: FAMILY MEDICINE

## 2023-01-01 PROCEDURE — 99497 ADVNCD CARE PLAN 30 MIN: CPT | Performed by: FAMILY MEDICINE

## 2023-01-01 PROCEDURE — 99207 PR NO BILLABLE SERVICE THIS VISIT: CPT | Performed by: EMERGENCY MEDICINE

## 2023-01-01 PROCEDURE — 93306 TTE W/DOPPLER COMPLETE: CPT

## 2023-01-01 PROCEDURE — 99223 1ST HOSP IP/OBS HIGH 75: CPT | Performed by: CLINICAL NURSE SPECIALIST

## 2023-01-01 PROCEDURE — 250N000011 HC RX IP 250 OP 636: Performed by: STUDENT IN AN ORGANIZED HEALTH CARE EDUCATION/TRAINING PROGRAM

## 2023-01-01 PROCEDURE — 250N000013 HC RX MED GY IP 250 OP 250 PS 637

## 2023-01-01 PROCEDURE — 97161 PT EVAL LOW COMPLEX 20 MIN: CPT | Mod: GP

## 2023-01-01 PROCEDURE — 99418 PROLNG IP/OBS E/M EA 15 MIN: CPT | Performed by: NURSE PRACTITIONER

## 2023-01-01 PROCEDURE — 99207 PR NO CHARGE LOS: CPT | Performed by: INTERNAL MEDICINE

## 2023-01-01 PROCEDURE — 83540 ASSAY OF IRON: CPT | Performed by: EMERGENCY MEDICINE

## 2023-01-01 PROCEDURE — 999N000197 HC STATISTIC WOC PT EDUCATION, 0-15 MIN

## 2023-01-01 PROCEDURE — 93306 TTE W/DOPPLER COMPLETE: CPT | Mod: 26 | Performed by: INTERNAL MEDICINE

## 2023-01-01 PROCEDURE — 258N000003 HC RX IP 258 OP 636

## 2023-01-01 PROCEDURE — 83550 IRON BINDING TEST: CPT | Performed by: EMERGENCY MEDICINE

## 2023-01-01 PROCEDURE — 99232 SBSQ HOSP IP/OBS MODERATE 35: CPT | Performed by: NURSE PRACTITIONER

## 2023-01-01 PROCEDURE — 99222 1ST HOSP IP/OBS MODERATE 55: CPT | Mod: 25 | Performed by: INTERNAL MEDICINE

## 2023-01-01 PROCEDURE — 99222 1ST HOSP IP/OBS MODERATE 55: CPT | Performed by: EMERGENCY MEDICINE

## 2023-01-01 PROCEDURE — 250N000011 HC RX IP 250 OP 636: Mod: JZ | Performed by: NURSE ANESTHETIST, CERTIFIED REGISTERED

## 2023-01-01 PROCEDURE — 999N000104 CT LUMBAR SPINE RECONSTRUCTED

## 2023-01-01 PROCEDURE — 370N000017 HC ANESTHESIA TECHNICAL FEE, PER MIN: Performed by: STUDENT IN AN ORGANIZED HEALTH CARE EDUCATION/TRAINING PROGRAM

## 2023-01-01 PROCEDURE — 86923 COMPATIBILITY TEST ELECTRIC: CPT | Performed by: STUDENT IN AN ORGANIZED HEALTH CARE EDUCATION/TRAINING PROGRAM

## 2023-01-01 PROCEDURE — 85610 PROTHROMBIN TIME: CPT

## 2023-01-01 PROCEDURE — 250N000011 HC RX IP 250 OP 636: Mod: JZ | Performed by: STUDENT IN AN ORGANIZED HEALTH CARE EDUCATION/TRAINING PROGRAM

## 2023-01-01 PROCEDURE — 97530 THERAPEUTIC ACTIVITIES: CPT | Mod: GP

## 2023-01-01 PROCEDURE — 99215 OFFICE O/P EST HI 40 MIN: CPT | Performed by: PSYCHIATRY & NEUROLOGY

## 2023-01-01 PROCEDURE — P9016 RBC LEUKOCYTES REDUCED: HCPCS | Performed by: STUDENT IN AN ORGANIZED HEALTH CARE EDUCATION/TRAINING PROGRAM

## 2023-01-01 PROCEDURE — 70450 CT HEAD/BRAIN W/O DYE: CPT

## 2023-01-01 PROCEDURE — 73552 X-RAY EXAM OF FEMUR 2/>: CPT | Mod: LT

## 2023-01-01 PROCEDURE — 258N000003 HC RX IP 258 OP 636: Performed by: NURSE ANESTHETIST, CERTIFIED REGISTERED

## 2023-01-01 PROCEDURE — 99232 SBSQ HOSP IP/OBS MODERATE 35: CPT | Performed by: EMERGENCY MEDICINE

## 2023-01-01 PROCEDURE — 250N000011 HC RX IP 250 OP 636: Mod: JZ | Performed by: EMERGENCY MEDICINE

## 2023-01-01 PROCEDURE — 999N000104 CT THORACIC SPINE RECONSTRUCTED

## 2023-01-01 PROCEDURE — 87086 URINE CULTURE/COLONY COUNT: CPT | Performed by: NURSE PRACTITIONER

## 2023-01-01 PROCEDURE — 82040 ASSAY OF SERUM ALBUMIN: CPT | Performed by: EMERGENCY MEDICINE

## 2023-01-01 PROCEDURE — 97110 THERAPEUTIC EXERCISES: CPT | Mod: GP

## 2023-01-01 PROCEDURE — 258N000003 HC RX IP 258 OP 636: Performed by: EMERGENCY MEDICINE

## 2023-01-01 PROCEDURE — 82947 ASSAY GLUCOSE BLOOD QUANT: CPT | Performed by: EMERGENCY MEDICINE

## 2023-01-01 PROCEDURE — 73620 X-RAY EXAM OF FOOT: CPT | Mod: LT

## 2023-01-01 PROCEDURE — 72170 X-RAY EXAM OF PELVIS: CPT

## 2023-01-01 PROCEDURE — 72125 CT NECK SPINE W/O DYE: CPT

## 2023-01-01 PROCEDURE — 710N000010 HC RECOVERY PHASE 1, LEVEL 2, PER MIN: Performed by: STUDENT IN AN ORGANIZED HEALTH CARE EDUCATION/TRAINING PROGRAM

## 2023-01-01 PROCEDURE — 360N000084 HC SURGERY LEVEL 4 W/ FLUORO, PER MIN: Performed by: STUDENT IN AN ORGANIZED HEALTH CARE EDUCATION/TRAINING PROGRAM

## 2023-01-01 PROCEDURE — C1713 ANCHOR/SCREW BN/BN,TIS/BN: HCPCS | Performed by: STUDENT IN AN ORGANIZED HEALTH CARE EDUCATION/TRAINING PROGRAM

## 2023-01-01 PROCEDURE — 250N000025 HC SEVOFLURANE, PER MIN: Performed by: STUDENT IN AN ORGANIZED HEALTH CARE EDUCATION/TRAINING PROGRAM

## 2023-01-01 PROCEDURE — 999N000179 XR SURGERY CARM FLUORO LESS THAN 5 MIN W STILLS: Mod: TC

## 2023-01-01 PROCEDURE — 99238 HOSP IP/OBS DSCHRG MGMT 30/<: CPT | Performed by: INTERNAL MEDICINE

## 2023-01-01 PROCEDURE — 250N000011 HC RX IP 250 OP 636: Performed by: EMERGENCY MEDICINE

## 2023-01-01 PROCEDURE — 36415 COLL VENOUS BLD VENIPUNCTURE: CPT

## 2023-01-01 PROCEDURE — 86900 BLOOD TYPING SEROLOGIC ABO: CPT | Performed by: STUDENT IN AN ORGANIZED HEALTH CARE EDUCATION/TRAINING PROGRAM

## 2023-01-01 PROCEDURE — 99233 SBSQ HOSP IP/OBS HIGH 50: CPT | Performed by: CLINICAL NURSE SPECIALIST

## 2023-01-01 PROCEDURE — 99232 SBSQ HOSP IP/OBS MODERATE 35: CPT | Mod: GC | Performed by: FAMILY MEDICINE

## 2023-01-01 PROCEDURE — 99418 PROLNG IP/OBS E/M EA 15 MIN: CPT | Performed by: CLINICAL NURSE SPECIALIST

## 2023-01-01 PROCEDURE — 999N000065 XR FEMUR LEFT 2 VIEWS: Mod: LT

## 2023-01-01 DEVICE — IMP SCR SYN TFNA FENESTRATED LAG 90MM 04.038.190S: Type: IMPLANTABLE DEVICE | Site: HIP | Status: FUNCTIONAL

## 2023-01-01 DEVICE — IMPLANTABLE DEVICE: Type: IMPLANTABLE DEVICE | Site: HIP | Status: FUNCTIONAL

## 2023-01-01 DEVICE — SCREW BN 42MM 5MM LCK X25 STRL IM NL 04.045.042S: Type: IMPLANTABLE DEVICE | Site: HIP | Status: FUNCTIONAL

## 2023-01-01 RX ORDER — HYDROMORPHONE HYDROCHLORIDE 1 MG/ML
0.25 INJECTION, SOLUTION INTRAMUSCULAR; INTRAVENOUS; SUBCUTANEOUS ONCE
Status: COMPLETED | OUTPATIENT
Start: 2023-01-01 | End: 2023-01-01

## 2023-01-01 RX ORDER — OXYCODONE HYDROCHLORIDE 5 MG/1
10 TABLET ORAL EVERY 4 HOURS PRN
Status: DISCONTINUED | OUTPATIENT
Start: 2023-01-01 | End: 2023-01-01

## 2023-01-01 RX ORDER — OXYCODONE HCL 20 MG/ML
5-10 CONCENTRATE, ORAL ORAL EVERY 4 HOURS PRN
Status: DISCONTINUED | OUTPATIENT
Start: 2023-01-01 | End: 2023-01-01

## 2023-01-01 RX ORDER — HYDROMORPHONE HCL IN WATER/PF 6 MG/30 ML
0.2 PATIENT CONTROLLED ANALGESIA SYRINGE INTRAVENOUS
Status: DISCONTINUED | OUTPATIENT
Start: 2023-01-01 | End: 2023-01-01

## 2023-01-01 RX ORDER — AMOXICILLIN 250 MG
1 CAPSULE ORAL 2 TIMES DAILY
Start: 2023-01-01

## 2023-01-01 RX ORDER — CARBIDOPA AND LEVODOPA 25; 100 MG/1; MG/1
TABLET ORAL
Qty: 270 TABLET | Refills: 3 | Status: SHIPPED | OUTPATIENT
Start: 2023-01-01

## 2023-01-01 RX ORDER — AMOXICILLIN 250 MG
1 CAPSULE ORAL 2 TIMES DAILY
Status: DISCONTINUED | OUTPATIENT
Start: 2023-01-01 | End: 2023-01-01

## 2023-01-01 RX ORDER — HYDROMORPHONE HYDROCHLORIDE 1 MG/ML
.3-.5 INJECTION, SOLUTION INTRAMUSCULAR; INTRAVENOUS; SUBCUTANEOUS
Status: DISCONTINUED | OUTPATIENT
Start: 2023-01-01 | End: 2023-01-01

## 2023-01-01 RX ORDER — POLYETHYLENE GLYCOL 3350 17 G/17G
17 POWDER, FOR SOLUTION ORAL 2 TIMES DAILY
Status: DISCONTINUED | OUTPATIENT
Start: 2023-01-01 | End: 2023-01-01

## 2023-01-01 RX ORDER — ONDANSETRON 2 MG/ML
4 INJECTION INTRAMUSCULAR; INTRAVENOUS EVERY 30 MIN PRN
Status: DISCONTINUED | OUTPATIENT
Start: 2023-01-01 | End: 2023-01-01 | Stop reason: HOSPADM

## 2023-01-01 RX ORDER — HYDROMORPHONE HCL IN WATER/PF 6 MG/30 ML
0.4 PATIENT CONTROLLED ANALGESIA SYRINGE INTRAVENOUS
Status: DISCONTINUED | OUTPATIENT
Start: 2023-01-01 | End: 2023-01-01

## 2023-01-01 RX ORDER — HALOPERIDOL 2 MG/ML
2 SOLUTION ORAL EVERY 6 HOURS PRN
Status: DISCONTINUED | OUTPATIENT
Start: 2023-01-01 | End: 2023-01-01 | Stop reason: HOSPADM

## 2023-01-01 RX ORDER — ESTRADIOL 0.1 MG/G
CREAM VAGINAL
COMMUNITY

## 2023-01-01 RX ORDER — LIDOCAINE 4 G/G
1 PATCH TOPICAL EVERY 24 HOURS
Status: DISCONTINUED | OUTPATIENT
Start: 2023-01-01 | End: 2023-01-01

## 2023-01-01 RX ORDER — HALOPERIDOL 2 MG/ML
.5-1 SOLUTION ORAL EVERY 6 HOURS PRN
DISCHARGE
Start: 2023-01-01

## 2023-01-01 RX ORDER — CARBIDOPA AND LEVODOPA 25; 100 MG/1; MG/1
1 TABLET ORAL ONCE
Status: COMPLETED | OUTPATIENT
Start: 2023-01-01 | End: 2023-01-01

## 2023-01-01 RX ORDER — POLYETHYLENE GLYCOL 3350 17 G/17G
17 POWDER, FOR SOLUTION ORAL DAILY PRN
Status: DISCONTINUED | OUTPATIENT
Start: 2023-01-01 | End: 2023-01-01 | Stop reason: HOSPADM

## 2023-01-01 RX ORDER — FENTANYL CITRATE 50 UG/ML
50 INJECTION, SOLUTION INTRAMUSCULAR; INTRAVENOUS EVERY 5 MIN PRN
Status: DISCONTINUED | OUTPATIENT
Start: 2023-01-01 | End: 2023-01-01 | Stop reason: HOSPADM

## 2023-01-01 RX ORDER — LOSARTAN POTASSIUM 50 MG/1
50 TABLET ORAL 2 TIMES DAILY
Status: DISCONTINUED | OUTPATIENT
Start: 2023-01-01 | End: 2023-01-01

## 2023-01-01 RX ORDER — OLANZAPINE 5 MG/1
5 TABLET, ORALLY DISINTEGRATING ORAL AT BEDTIME
Status: DISCONTINUED | OUTPATIENT
Start: 2023-01-01 | End: 2023-01-01

## 2023-01-01 RX ORDER — CEFAZOLIN SODIUM/WATER 2 G/20 ML
1 SYRINGE (ML) INTRAVENOUS
Status: DISCONTINUED | OUTPATIENT
Start: 2023-01-01 | End: 2023-01-01

## 2023-01-01 RX ORDER — MORPHINE SULFATE 2 MG/ML
2 INJECTION, SOLUTION INTRAMUSCULAR; INTRAVENOUS ONCE
Status: DISCONTINUED | OUTPATIENT
Start: 2023-01-01 | End: 2023-01-01

## 2023-01-01 RX ORDER — HYDROMORPHONE HYDROCHLORIDE 1 MG/ML
0.5 INJECTION, SOLUTION INTRAMUSCULAR; INTRAVENOUS; SUBCUTANEOUS ONCE
Status: COMPLETED | OUTPATIENT
Start: 2023-01-01 | End: 2023-01-01

## 2023-01-01 RX ORDER — CLINDAMYCIN PHOSPHATE 900 MG/50ML
900 INJECTION, SOLUTION INTRAVENOUS SEE ADMIN INSTRUCTIONS
Status: DISCONTINUED | OUTPATIENT
Start: 2023-01-01 | End: 2023-01-01

## 2023-01-01 RX ORDER — KETAMINE HYDROCHLORIDE 10 MG/ML
INJECTION INTRAMUSCULAR; INTRAVENOUS PRN
Status: DISCONTINUED | OUTPATIENT
Start: 2023-01-01 | End: 2023-01-01

## 2023-01-01 RX ORDER — VANCOMYCIN HYDROCHLORIDE 1 G/20ML
INJECTION, POWDER, LYOPHILIZED, FOR SOLUTION INTRAVENOUS PRN
Status: DISCONTINUED | OUTPATIENT
Start: 2023-01-01 | End: 2023-01-01 | Stop reason: HOSPADM

## 2023-01-01 RX ORDER — OXYCODONE HCL 20 MG/ML
5 CONCENTRATE, ORAL ORAL
Status: DISCONTINUED | OUTPATIENT
Start: 2023-01-01 | End: 2023-01-01

## 2023-01-01 RX ORDER — LACTOBACILLUS RHAMNOSUS GG 10B CELL
1 CAPSULE ORAL DAILY
Status: DISCONTINUED | OUTPATIENT
Start: 2023-01-01 | End: 2023-01-01

## 2023-01-01 RX ORDER — LOSARTAN POTASSIUM 50 MG/1
50 TABLET ORAL DAILY
Status: DISCONTINUED | OUTPATIENT
Start: 2023-01-01 | End: 2023-01-01

## 2023-01-01 RX ORDER — GABAPENTIN 100 MG/1
100 CAPSULE ORAL 3 TIMES DAILY
Start: 2023-01-01

## 2023-01-01 RX ORDER — MAGNESIUM SULFATE 4 G/50ML
4 INJECTION INTRAVENOUS ONCE
Status: DISCONTINUED | OUTPATIENT
Start: 2023-01-01 | End: 2023-01-01 | Stop reason: HOSPADM

## 2023-01-01 RX ORDER — AMOXICILLIN 250 MG
1 CAPSULE ORAL DAILY
Status: DISCONTINUED | OUTPATIENT
Start: 2023-01-01 | End: 2023-01-01

## 2023-01-01 RX ORDER — OXYCODONE HYDROCHLORIDE 5 MG/1
5 TABLET ORAL 3 TIMES DAILY
Status: DISCONTINUED | OUTPATIENT
Start: 2023-01-01 | End: 2023-01-01

## 2023-01-01 RX ORDER — OXYCODONE HYDROCHLORIDE 5 MG/1
5-10 TABLET ORAL EVERY 4 HOURS PRN
Status: DISCONTINUED | OUTPATIENT
Start: 2023-01-01 | End: 2023-01-01

## 2023-01-01 RX ORDER — QUETIAPINE FUMARATE 25 MG/1
25 TABLET, FILM COATED ORAL AT BEDTIME
Status: DISCONTINUED | OUTPATIENT
Start: 2023-01-01 | End: 2023-01-01

## 2023-01-01 RX ORDER — IOPAMIDOL 755 MG/ML
90 INJECTION, SOLUTION INTRAVASCULAR ONCE
Status: COMPLETED | OUTPATIENT
Start: 2023-01-01 | End: 2023-01-01

## 2023-01-01 RX ORDER — QUETIAPINE FUMARATE 25 MG/1
25 TABLET, FILM COATED ORAL 2 TIMES DAILY
Status: DISCONTINUED | OUTPATIENT
Start: 2023-01-01 | End: 2023-01-01

## 2023-01-01 RX ORDER — DULOXETIN HYDROCHLORIDE 60 MG/1
60 CAPSULE, DELAYED RELEASE ORAL DAILY
Status: DISCONTINUED | OUTPATIENT
Start: 2023-01-01 | End: 2023-01-01 | Stop reason: HOSPADM

## 2023-01-01 RX ORDER — CARBIDOPA AND LEVODOPA 25; 100 MG/1; MG/1
1 TABLET ORAL 3 TIMES DAILY
Status: DISCONTINUED | OUTPATIENT
Start: 2023-01-01 | End: 2023-01-01

## 2023-01-01 RX ORDER — ASPIRIN 81 MG/1
81 TABLET ORAL 2 TIMES DAILY
Status: DISCONTINUED | OUTPATIENT
Start: 2023-01-01 | End: 2023-01-01

## 2023-01-01 RX ORDER — GABAPENTIN 100 MG/1
100 CAPSULE ORAL 3 TIMES DAILY
Status: DISCONTINUED | OUTPATIENT
Start: 2023-01-01 | End: 2023-01-01 | Stop reason: HOSPADM

## 2023-01-01 RX ORDER — CEFAZOLIN SODIUM/WATER 2 G/20 ML
2 SYRINGE (ML) INTRAVENOUS
Status: COMPLETED | OUTPATIENT
Start: 2023-01-01 | End: 2023-01-01

## 2023-01-01 RX ORDER — LIDOCAINE 50 MG/G
2 PATCH TOPICAL EVERY 24 HOURS
Start: 2023-01-01

## 2023-01-01 RX ORDER — ONDANSETRON 4 MG/1
4 TABLET, ORALLY DISINTEGRATING ORAL EVERY 6 HOURS PRN
Status: DISCONTINUED | OUTPATIENT
Start: 2023-01-01 | End: 2023-01-01 | Stop reason: HOSPADM

## 2023-01-01 RX ORDER — ACETAMINOPHEN 325 MG/1
975 TABLET ORAL 3 TIMES DAILY
Status: DISCONTINUED | OUTPATIENT
Start: 2023-01-01 | End: 2023-01-01

## 2023-01-01 RX ORDER — OXYCODONE HCL 20 MG/ML
5 CONCENTRATE, ORAL ORAL EVERY 4 HOURS
Status: DISCONTINUED | OUTPATIENT
Start: 2023-01-01 | End: 2023-01-01

## 2023-01-01 RX ORDER — NALOXONE HYDROCHLORIDE 0.4 MG/ML
0.2 INJECTION, SOLUTION INTRAMUSCULAR; INTRAVENOUS; SUBCUTANEOUS
Status: DISCONTINUED | OUTPATIENT
Start: 2023-01-01 | End: 2023-01-01 | Stop reason: HOSPADM

## 2023-01-01 RX ORDER — NALOXONE HYDROCHLORIDE 0.4 MG/ML
0.4 INJECTION, SOLUTION INTRAMUSCULAR; INTRAVENOUS; SUBCUTANEOUS
Status: DISCONTINUED | OUTPATIENT
Start: 2023-01-01 | End: 2023-01-01 | Stop reason: HOSPADM

## 2023-01-01 RX ORDER — OXYCODONE HYDROCHLORIDE 5 MG/1
5 TABLET ORAL EVERY 4 HOURS PRN
Status: DISCONTINUED | OUTPATIENT
Start: 2023-01-01 | End: 2023-01-01

## 2023-01-01 RX ORDER — ONDANSETRON 2 MG/ML
4 INJECTION INTRAMUSCULAR; INTRAVENOUS EVERY 30 MIN PRN
Status: CANCELLED | OUTPATIENT
Start: 2023-01-01

## 2023-01-01 RX ORDER — LIDOCAINE 40 MG/G
CREAM TOPICAL
Status: DISCONTINUED | OUTPATIENT
Start: 2023-01-01 | End: 2023-01-01 | Stop reason: HOSPADM

## 2023-01-01 RX ORDER — LEVOTHYROXINE SODIUM 20 UG/ML
70 INJECTION, SOLUTION INTRAVENOUS
Status: DISCONTINUED | OUTPATIENT
Start: 2023-01-01 | End: 2023-01-01

## 2023-01-01 RX ORDER — NALOXONE HYDROCHLORIDE 0.4 MG/ML
0.1 INJECTION, SOLUTION INTRAMUSCULAR; INTRAVENOUS; SUBCUTANEOUS
Status: DISCONTINUED | OUTPATIENT
Start: 2023-01-01 | End: 2023-01-01

## 2023-01-01 RX ORDER — HYDRALAZINE HYDROCHLORIDE 10 MG/1
10 TABLET, FILM COATED ORAL 3 TIMES DAILY
Status: ON HOLD | COMMUNITY
Start: 2023-01-01 | End: 2023-01-01

## 2023-01-01 RX ORDER — HYDROXYZINE HYDROCHLORIDE 10 MG/1
10 TABLET, FILM COATED ORAL EVERY 6 HOURS PRN
Status: DISCONTINUED | OUTPATIENT
Start: 2023-01-01 | End: 2023-01-01 | Stop reason: HOSPADM

## 2023-01-01 RX ORDER — CLINDAMYCIN PHOSPHATE 900 MG/50ML
900 INJECTION, SOLUTION INTRAVENOUS
Status: DISCONTINUED | OUTPATIENT
Start: 2023-01-01 | End: 2023-01-01

## 2023-01-01 RX ORDER — OXYCODONE HCL 20 MG/ML
5-10 CONCENTRATE, ORAL ORAL
Status: DISCONTINUED | OUTPATIENT
Start: 2023-01-01 | End: 2023-01-01

## 2023-01-01 RX ORDER — ONDANSETRON 4 MG/1
4 TABLET, ORALLY DISINTEGRATING ORAL EVERY 30 MIN PRN
Status: CANCELLED | OUTPATIENT
Start: 2023-01-01

## 2023-01-01 RX ORDER — PROPOFOL 10 MG/ML
INJECTION, EMULSION INTRAVENOUS PRN
Status: DISCONTINUED | OUTPATIENT
Start: 2023-01-01 | End: 2023-01-01

## 2023-01-01 RX ORDER — BISACODYL 10 MG
10 SUPPOSITORY, RECTAL RECTAL DAILY PRN
Status: DISCONTINUED | OUTPATIENT
Start: 2023-01-01 | End: 2023-01-01 | Stop reason: HOSPADM

## 2023-01-01 RX ORDER — HYDRALAZINE HYDROCHLORIDE 10 MG/1
10 TABLET, FILM COATED ORAL 3 TIMES DAILY
Status: DISCONTINUED | OUTPATIENT
Start: 2023-01-01 | End: 2023-01-01

## 2023-01-01 RX ORDER — OXYCODONE HYDROCHLORIDE 5 MG/1
5 TABLET ORAL
Status: CANCELLED | OUTPATIENT
Start: 2023-01-01

## 2023-01-01 RX ORDER — ACETAMINOPHEN 325 MG/1
975 TABLET ORAL ONCE
Status: DISCONTINUED | OUTPATIENT
Start: 2023-01-01 | End: 2023-01-01 | Stop reason: HOSPADM

## 2023-01-01 RX ORDER — FENTANYL CITRATE 50 UG/ML
25-100 INJECTION, SOLUTION INTRAMUSCULAR; INTRAVENOUS
Status: DISCONTINUED | OUTPATIENT
Start: 2023-01-01 | End: 2023-01-01 | Stop reason: HOSPADM

## 2023-01-01 RX ORDER — ONDANSETRON 2 MG/ML
INJECTION INTRAMUSCULAR; INTRAVENOUS PRN
Status: DISCONTINUED | OUTPATIENT
Start: 2023-01-01 | End: 2023-01-01

## 2023-01-01 RX ORDER — HYDRALAZINE HYDROCHLORIDE 20 MG/ML
5 INJECTION INTRAMUSCULAR; INTRAVENOUS EVERY 4 HOURS PRN
Status: DISCONTINUED | OUTPATIENT
Start: 2023-01-01 | End: 2023-01-01

## 2023-01-01 RX ORDER — NALOXONE HYDROCHLORIDE 0.4 MG/ML
0.2 INJECTION, SOLUTION INTRAMUSCULAR; INTRAVENOUS; SUBCUTANEOUS
Status: DISCONTINUED | OUTPATIENT
Start: 2023-01-01 | End: 2023-01-01

## 2023-01-01 RX ORDER — CEFAZOLIN SODIUM 1 G/3ML
1 INJECTION, POWDER, FOR SOLUTION INTRAMUSCULAR; INTRAVENOUS EVERY 8 HOURS
Qty: 10 ML | Refills: 0 | Status: COMPLETED | OUTPATIENT
Start: 2023-01-01 | End: 2023-01-01

## 2023-01-01 RX ORDER — ACETAMINOPHEN 325 MG/1
650 TABLET ORAL EVERY 4 HOURS PRN
Status: DISCONTINUED | OUTPATIENT
Start: 2023-01-01 | End: 2023-01-01 | Stop reason: HOSPADM

## 2023-01-01 RX ORDER — SODIUM CHLORIDE, SODIUM LACTATE, POTASSIUM CHLORIDE, CALCIUM CHLORIDE 600; 310; 30; 20 MG/100ML; MG/100ML; MG/100ML; MG/100ML
INJECTION, SOLUTION INTRAVENOUS CONTINUOUS
Status: DISCONTINUED | OUTPATIENT
Start: 2023-01-01 | End: 2023-01-01 | Stop reason: HOSPADM

## 2023-01-01 RX ORDER — OXYCODONE HCL 20 MG/ML
5 CONCENTRATE, ORAL ORAL ONCE
Status: COMPLETED | OUTPATIENT
Start: 2023-01-01 | End: 2023-01-01

## 2023-01-01 RX ORDER — CARBIDOPA AND LEVODOPA 25; 100 MG/1; MG/1
TABLET ORAL
Qty: 315 TABLET | Refills: 3 | Status: SHIPPED | OUTPATIENT
Start: 2023-01-01 | End: 2023-01-01

## 2023-01-01 RX ORDER — ATROPINE SULFATE 10 MG/ML
1-2 SOLUTION/ DROPS OPHTHALMIC EVERY 4 HOURS PRN
DISCHARGE
Start: 2023-01-01

## 2023-01-01 RX ORDER — FENTANYL CITRATE 50 UG/ML
25 INJECTION, SOLUTION INTRAMUSCULAR; INTRAVENOUS EVERY 5 MIN PRN
Status: DISCONTINUED | OUTPATIENT
Start: 2023-01-01 | End: 2023-01-01 | Stop reason: HOSPADM

## 2023-01-01 RX ORDER — AMOXICILLIN 250 MG
1 CAPSULE ORAL 2 TIMES DAILY
Status: DISCONTINUED | OUTPATIENT
Start: 2023-01-01 | End: 2023-01-01 | Stop reason: HOSPADM

## 2023-01-01 RX ORDER — VITAMIN B COMPLEX
50 TABLET ORAL DAILY
Status: DISCONTINUED | OUTPATIENT
Start: 2023-01-01 | End: 2023-01-01

## 2023-01-01 RX ORDER — PROCHLORPERAZINE MALEATE 5 MG
5 TABLET ORAL EVERY 6 HOURS PRN
Status: DISCONTINUED | OUTPATIENT
Start: 2023-01-01 | End: 2023-01-01 | Stop reason: HOSPADM

## 2023-01-01 RX ORDER — OLANZAPINE 5 MG/1
5 TABLET, ORALLY DISINTEGRATING ORAL 2 TIMES DAILY
Start: 2023-01-01

## 2023-01-01 RX ORDER — OLANZAPINE 5 MG/1
5 TABLET, ORALLY DISINTEGRATING ORAL 2 TIMES DAILY
Status: DISCONTINUED | OUTPATIENT
Start: 2023-01-01 | End: 2023-01-01 | Stop reason: HOSPADM

## 2023-01-01 RX ORDER — LIDOCAINE 4 G/G
2 PATCH TOPICAL EVERY 24 HOURS
Status: DISCONTINUED | OUTPATIENT
Start: 2023-01-01 | End: 2023-01-01 | Stop reason: HOSPADM

## 2023-01-01 RX ORDER — FENTANYL CITRATE 50 UG/ML
INJECTION, SOLUTION INTRAMUSCULAR; INTRAVENOUS PRN
Status: DISCONTINUED | OUTPATIENT
Start: 2023-01-01 | End: 2023-01-01

## 2023-01-01 RX ORDER — ACETAMINOPHEN 325 MG/1
975 TABLET ORAL EVERY 8 HOURS
Status: COMPLETED | OUTPATIENT
Start: 2023-01-01 | End: 2023-01-01

## 2023-01-01 RX ORDER — OLANZAPINE 5 MG/1
5 TABLET, ORALLY DISINTEGRATING ORAL EVERY 8 HOURS PRN
Start: 2023-01-01

## 2023-01-01 RX ORDER — DEXAMETHASONE SODIUM PHOSPHATE 4 MG/ML
INJECTION, SOLUTION INTRA-ARTICULAR; INTRALESIONAL; INTRAMUSCULAR; INTRAVENOUS; SOFT TISSUE PRN
Status: DISCONTINUED | OUTPATIENT
Start: 2023-01-01 | End: 2023-01-01

## 2023-01-01 RX ORDER — OXYCODONE HCL 20 MG/ML
5 CONCENTRATE, ORAL ORAL
Status: DISCONTINUED | OUTPATIENT
Start: 2023-01-01 | End: 2023-01-01 | Stop reason: HOSPADM

## 2023-01-01 RX ORDER — LIDOCAINE HYDROCHLORIDE 10 MG/ML
INJECTION, SOLUTION INFILTRATION; PERINEURAL PRN
Status: DISCONTINUED | OUTPATIENT
Start: 2023-01-01 | End: 2023-01-01

## 2023-01-01 RX ORDER — OLANZAPINE 5 MG/1
5 TABLET, ORALLY DISINTEGRATING ORAL EVERY 8 HOURS PRN
Status: DISCONTINUED | OUTPATIENT
Start: 2023-01-01 | End: 2023-01-01 | Stop reason: HOSPADM

## 2023-01-01 RX ORDER — LEVOTHYROXINE SODIUM 50 UG/1
100 TABLET ORAL DAILY
Status: DISCONTINUED | OUTPATIENT
Start: 2023-01-01 | End: 2023-01-01 | Stop reason: HOSPADM

## 2023-01-01 RX ORDER — OXYCODONE HCL 20 MG/ML
5 CONCENTRATE, ORAL ORAL EVERY 6 HOURS
Status: DISCONTINUED | OUTPATIENT
Start: 2023-01-01 | End: 2023-01-01

## 2023-01-01 RX ORDER — ONDANSETRON 2 MG/ML
4 INJECTION INTRAMUSCULAR; INTRAVENOUS EVERY 6 HOURS PRN
Status: DISCONTINUED | OUTPATIENT
Start: 2023-01-01 | End: 2023-01-01 | Stop reason: HOSPADM

## 2023-01-01 RX ORDER — OXYCODONE HCL 20 MG/ML
5-10 CONCENTRATE, ORAL ORAL
Qty: 30 ML | Refills: 0 | Status: SHIPPED | OUTPATIENT
Start: 2023-01-01

## 2023-01-01 RX ORDER — GABAPENTIN 100 MG/1
100 CAPSULE ORAL 2 TIMES DAILY
Status: ON HOLD | COMMUNITY
End: 2023-01-01

## 2023-01-01 RX ORDER — NEOSTIGMINE METHYLSULFATE 1 MG/ML
VIAL (ML) INJECTION PRN
Status: DISCONTINUED | OUTPATIENT
Start: 2023-01-01 | End: 2023-01-01

## 2023-01-01 RX ORDER — ONDANSETRON 2 MG/ML
4 INJECTION INTRAMUSCULAR; INTRAVENOUS EVERY 6 HOURS PRN
Status: DISCONTINUED | OUTPATIENT
Start: 2023-01-01 | End: 2023-01-01

## 2023-01-01 RX ORDER — SODIUM CHLORIDE, SODIUM LACTATE, POTASSIUM CHLORIDE, CALCIUM CHLORIDE 600; 310; 30; 20 MG/100ML; MG/100ML; MG/100ML; MG/100ML
INJECTION, SOLUTION INTRAVENOUS CONTINUOUS PRN
Status: DISCONTINUED | OUTPATIENT
Start: 2023-01-01 | End: 2023-01-01

## 2023-01-01 RX ORDER — HYDROMORPHONE HCL IN WATER/PF 6 MG/30 ML
0.2 PATIENT CONTROLLED ANALGESIA SYRINGE INTRAVENOUS EVERY 5 MIN PRN
Status: DISCONTINUED | OUTPATIENT
Start: 2023-01-01 | End: 2023-01-01 | Stop reason: HOSPADM

## 2023-01-01 RX ORDER — OXYCODONE HCL 20 MG/ML
5 CONCENTRATE, ORAL ORAL EVERY 6 HOURS
Status: DISCONTINUED | OUTPATIENT
Start: 2023-01-01 | End: 2023-01-01 | Stop reason: HOSPADM

## 2023-01-01 RX ORDER — POLYETHYLENE GLYCOL 3350 17 G/17G
17 POWDER, FOR SOLUTION ORAL DAILY PRN
Start: 2023-01-01

## 2023-01-01 RX ORDER — GABAPENTIN 300 MG/1
300 CAPSULE ORAL AT BEDTIME
Status: DISCONTINUED | OUTPATIENT
Start: 2023-01-01 | End: 2023-01-01

## 2023-01-01 RX ORDER — SODIUM CHLORIDE 9 MG/ML
INJECTION, SOLUTION INTRAVENOUS CONTINUOUS
Status: DISCONTINUED | OUTPATIENT
Start: 2023-01-01 | End: 2023-01-01 | Stop reason: ALTCHOICE

## 2023-01-01 RX ORDER — GABAPENTIN 100 MG/1
100 CAPSULE ORAL 2 TIMES DAILY
Status: DISCONTINUED | OUTPATIENT
Start: 2023-01-01 | End: 2023-01-01

## 2023-01-01 RX ORDER — GABAPENTIN 300 MG/1
300 CAPSULE ORAL AT BEDTIME
Status: ON HOLD | COMMUNITY
End: 2023-01-01

## 2023-01-01 RX ORDER — HYDROMORPHONE HCL IN WATER/PF 6 MG/30 ML
0.4 PATIENT CONTROLLED ANALGESIA SYRINGE INTRAVENOUS EVERY 5 MIN PRN
Status: DISCONTINUED | OUTPATIENT
Start: 2023-01-01 | End: 2023-01-01 | Stop reason: HOSPADM

## 2023-01-01 RX ORDER — BUPIVACAINE HYDROCHLORIDE 2.5 MG/ML
INJECTION, SOLUTION EPIDURAL; INFILTRATION; INTRACAUDAL
Status: COMPLETED | OUTPATIENT
Start: 2023-01-01 | End: 2023-01-01

## 2023-01-01 RX ORDER — OXYCODONE HYDROCHLORIDE 5 MG/1
5 TABLET ORAL
Status: DISCONTINUED | OUTPATIENT
Start: 2023-01-01 | End: 2023-01-01

## 2023-01-01 RX ORDER — OLANZAPINE 5 MG/1
5 TABLET, ORALLY DISINTEGRATING ORAL EVERY 6 HOURS PRN
Status: DISCONTINUED | OUTPATIENT
Start: 2023-01-01 | End: 2023-01-01

## 2023-01-01 RX ORDER — ONDANSETRON 4 MG/1
4 TABLET, ORALLY DISINTEGRATING ORAL EVERY 30 MIN PRN
Status: DISCONTINUED | OUTPATIENT
Start: 2023-01-01 | End: 2023-01-01 | Stop reason: HOSPADM

## 2023-01-01 RX ORDER — POLYETHYLENE GLYCOL 3350 17 G/17G
17 POWDER, FOR SOLUTION ORAL DAILY
Status: DISCONTINUED | OUTPATIENT
Start: 2023-01-01 | End: 2023-01-01

## 2023-01-01 RX ORDER — SODIUM CHLORIDE, SODIUM LACTATE, POTASSIUM CHLORIDE, CALCIUM CHLORIDE 600; 310; 30; 20 MG/100ML; MG/100ML; MG/100ML; MG/100ML
INJECTION, SOLUTION INTRAVENOUS CONTINUOUS
Status: DISCONTINUED | OUTPATIENT
Start: 2023-01-01 | End: 2023-01-01 | Stop reason: ALTCHOICE

## 2023-01-01 RX ORDER — LEVOTHYROXINE SODIUM 20 UG/ML
70 INJECTION, SOLUTION INTRAVENOUS
Status: DISCONTINUED | OUTPATIENT
Start: 2023-01-01 | End: 2023-01-01 | Stop reason: HOSPADM

## 2023-01-01 RX ORDER — OXYCODONE HCL 20 MG/ML
10 CONCENTRATE, ORAL ORAL
Status: DISCONTINUED | OUTPATIENT
Start: 2023-01-01 | End: 2023-01-01 | Stop reason: HOSPADM

## 2023-01-01 RX ADMIN — SENNOSIDES AND DOCUSATE SODIUM 1 TABLET: 8.6; 5 TABLET ORAL at 19:59

## 2023-01-01 RX ADMIN — OXYCODONE HYDROCHLORIDE 5 MG: 100 SOLUTION ORAL at 04:16

## 2023-01-01 RX ADMIN — OXYCODONE HYDROCHLORIDE 5 MG: 100 SOLUTION ORAL at 12:30

## 2023-01-01 RX ADMIN — SENNOSIDES AND DOCUSATE SODIUM 1 TABLET: 8.6; 5 TABLET ORAL at 09:45

## 2023-01-01 RX ADMIN — SENNOSIDES AND DOCUSATE SODIUM 1 TABLET: 8.6; 5 TABLET ORAL at 20:39

## 2023-01-01 RX ADMIN — OLANZAPINE 5 MG: 5 TABLET, ORALLY DISINTEGRATING ORAL at 19:59

## 2023-01-01 RX ADMIN — ANORECTAL OINTMENT: 15.7; .44; 24; 20.6 OINTMENT TOPICAL at 05:24

## 2023-01-01 RX ADMIN — OXYCODONE HYDROCHLORIDE 5 MG: 100 SOLUTION ORAL at 02:39

## 2023-01-01 RX ADMIN — Medication 5 MG: at 21:46

## 2023-01-01 RX ADMIN — ANORECTAL OINTMENT: 15.7; .44; 24; 20.6 OINTMENT TOPICAL at 05:20

## 2023-01-01 RX ADMIN — LIDOCAINE 2 PATCH: 246 PATCH TOPICAL at 12:07

## 2023-01-01 RX ADMIN — OXYCODONE HYDROCHLORIDE 5 MG: 5 TABLET ORAL at 07:40

## 2023-01-01 RX ADMIN — GABAPENTIN 100 MG: 100 CAPSULE ORAL at 06:49

## 2023-01-01 RX ADMIN — LEVOTHYROXINE SODIUM 100 MCG: 0.05 TABLET ORAL at 07:39

## 2023-01-01 RX ADMIN — OXYCODONE HYDROCHLORIDE 5 MG: 5 TABLET ORAL at 17:39

## 2023-01-01 RX ADMIN — OLANZAPINE 5 MG: 5 TABLET, ORALLY DISINTEGRATING ORAL at 09:45

## 2023-01-01 RX ADMIN — OLANZAPINE 5 MG: 5 TABLET, ORALLY DISINTEGRATING ORAL at 10:17

## 2023-01-01 RX ADMIN — OLANZAPINE 5 MG: 5 TABLET, ORALLY DISINTEGRATING ORAL at 12:03

## 2023-01-01 RX ADMIN — GABAPENTIN 100 MG: 100 CAPSULE ORAL at 20:54

## 2023-01-01 RX ADMIN — ROCURONIUM BROMIDE 50 MG: 10 INJECTION, SOLUTION INTRAVENOUS at 19:33

## 2023-01-01 RX ADMIN — Medication 5 MG: at 22:47

## 2023-01-01 RX ADMIN — ANORECTAL OINTMENT: 15.7; .44; 24; 20.6 OINTMENT TOPICAL at 14:38

## 2023-01-01 RX ADMIN — DULOXETINE 60 MG: 60 CAPSULE, DELAYED RELEASE ORAL at 09:51

## 2023-01-01 RX ADMIN — OXYCODONE HYDROCHLORIDE 5 MG: 100 SOLUTION ORAL at 11:40

## 2023-01-01 RX ADMIN — CARBIDOPA AND LEVODOPA 1 HALF-TAB: 25; 100 TABLET ORAL at 12:31

## 2023-01-01 RX ADMIN — OLANZAPINE 5 MG: 5 TABLET, ORALLY DISINTEGRATING ORAL at 20:01

## 2023-01-01 RX ADMIN — FENTANYL CITRATE 25 MCG: 50 INJECTION INTRAMUSCULAR; INTRAVENOUS at 20:11

## 2023-01-01 RX ADMIN — CARBIDOPA AND LEVODOPA 1 HALF-TAB: 25; 100 TABLET ORAL at 08:43

## 2023-01-01 RX ADMIN — SENNOSIDES AND DOCUSATE SODIUM 1 TABLET: 8.6; 5 TABLET ORAL at 22:14

## 2023-01-01 RX ADMIN — OXYCODONE HYDROCHLORIDE 5 MG: 5 TABLET ORAL at 08:32

## 2023-01-01 RX ADMIN — OXYCODONE HYDROCHLORIDE 5 MG: 100 SOLUTION ORAL at 21:44

## 2023-01-01 RX ADMIN — LIDOCAINE 2 PATCH: 246 PATCH TOPICAL at 15:16

## 2023-01-01 RX ADMIN — OLANZAPINE 5 MG: 5 TABLET, ORALLY DISINTEGRATING ORAL at 10:21

## 2023-01-01 RX ADMIN — GABAPENTIN 100 MG: 100 CAPSULE ORAL at 20:08

## 2023-01-01 RX ADMIN — GABAPENTIN 100 MG: 100 CAPSULE ORAL at 14:14

## 2023-01-01 RX ADMIN — Medication 5 MG: at 10:14

## 2023-01-01 RX ADMIN — HYDRALAZINE HYDROCHLORIDE 10 MG: 10 TABLET ORAL at 09:51

## 2023-01-01 RX ADMIN — SENNOSIDES AND DOCUSATE SODIUM 1 TABLET: 8.6; 5 TABLET ORAL at 08:43

## 2023-01-01 RX ADMIN — GABAPENTIN 100 MG: 100 CAPSULE ORAL at 13:04

## 2023-01-01 RX ADMIN — OXYCODONE HYDROCHLORIDE 5 MG: 100 SOLUTION ORAL at 16:10

## 2023-01-01 RX ADMIN — OXYCODONE HYDROCHLORIDE 5 MG: 100 SOLUTION ORAL at 18:15

## 2023-01-01 RX ADMIN — Medication 5 MG: at 16:58

## 2023-01-01 RX ADMIN — CARBIDOPA AND LEVODOPA 1 HALF-TAB: 25; 100 TABLET ORAL at 20:08

## 2023-01-01 RX ADMIN — Medication 5 MG: at 15:34

## 2023-01-01 RX ADMIN — Medication 5 MG: at 10:07

## 2023-01-01 RX ADMIN — SENNOSIDES AND DOCUSATE SODIUM 1 TABLET: 8.6; 5 TABLET ORAL at 20:06

## 2023-01-01 RX ADMIN — CARBIDOPA AND LEVODOPA 1 HALF-TAB: 25; 100 TABLET ORAL at 11:43

## 2023-01-01 RX ADMIN — LEVOTHYROXINE SODIUM 100 MCG: 0.05 TABLET ORAL at 08:53

## 2023-01-01 RX ADMIN — HYDRALAZINE HYDROCHLORIDE 10 MG: 10 TABLET ORAL at 14:05

## 2023-01-01 RX ADMIN — OLANZAPINE 5 MG: 5 TABLET, ORALLY DISINTEGRATING ORAL at 21:22

## 2023-01-01 RX ADMIN — Medication 5 MG: at 22:51

## 2023-01-01 RX ADMIN — OXYCODONE HYDROCHLORIDE 5 MG: 100 SOLUTION ORAL at 16:08

## 2023-01-01 RX ADMIN — OLANZAPINE 5 MG: 5 TABLET, ORALLY DISINTEGRATING ORAL at 08:32

## 2023-01-01 RX ADMIN — SENNOSIDES AND DOCUSATE SODIUM 1 TABLET: 8.6; 5 TABLET ORAL at 09:22

## 2023-01-01 RX ADMIN — POLYETHYLENE GLYCOL 3350 17 G: 17 POWDER, FOR SOLUTION ORAL at 09:30

## 2023-01-01 RX ADMIN — Medication 5 MG: at 22:25

## 2023-01-01 RX ADMIN — DULOXETINE 60 MG: 60 CAPSULE, DELAYED RELEASE ORAL at 09:04

## 2023-01-01 RX ADMIN — Medication 5 MG: at 17:08

## 2023-01-01 RX ADMIN — OXYCODONE HYDROCHLORIDE 10 MG: 100 SOLUTION ORAL at 11:01

## 2023-01-01 RX ADMIN — OLANZAPINE 5 MG: 5 TABLET, ORALLY DISINTEGRATING ORAL at 22:42

## 2023-01-01 RX ADMIN — GABAPENTIN 300 MG: 300 CAPSULE ORAL at 21:25

## 2023-01-01 RX ADMIN — CARBIDOPA AND LEVODOPA 1 HALF-TAB: 25; 100 TABLET ORAL at 20:00

## 2023-01-01 RX ADMIN — SENNOSIDES AND DOCUSATE SODIUM 1 TABLET: 8.6; 5 TABLET ORAL at 20:54

## 2023-01-01 RX ADMIN — LEVOTHYROXINE SODIUM 100 MCG: 0.05 TABLET ORAL at 09:25

## 2023-01-01 RX ADMIN — Medication 50 MCG: at 07:49

## 2023-01-01 RX ADMIN — GABAPENTIN 100 MG: 100 CAPSULE ORAL at 20:17

## 2023-01-01 RX ADMIN — OLANZAPINE 5 MG: 5 TABLET, ORALLY DISINTEGRATING ORAL at 12:20

## 2023-01-01 RX ADMIN — ACETAMINOPHEN 650 MG: 325 TABLET ORAL at 23:34

## 2023-01-01 RX ADMIN — DULOXETINE 60 MG: 60 CAPSULE, DELAYED RELEASE ORAL at 08:32

## 2023-01-01 RX ADMIN — SENNOSIDES AND DOCUSATE SODIUM 1 TABLET: 8.6; 5 TABLET ORAL at 20:01

## 2023-01-01 RX ADMIN — GABAPENTIN 100 MG: 100 CAPSULE ORAL at 09:50

## 2023-01-01 RX ADMIN — SENNOSIDES AND DOCUSATE SODIUM 1 TABLET: 8.6; 5 TABLET ORAL at 20:00

## 2023-01-01 RX ADMIN — ASPIRIN 81 MG: 81 TABLET, COATED ORAL at 20:06

## 2023-01-01 RX ADMIN — OLANZAPINE 5 MG: 5 TABLET, ORALLY DISINTEGRATING ORAL at 09:04

## 2023-01-01 RX ADMIN — GABAPENTIN 100 MG: 100 CAPSULE ORAL at 08:32

## 2023-01-01 RX ADMIN — OLANZAPINE 5 MG: 5 TABLET, ORALLY DISINTEGRATING ORAL at 20:03

## 2023-01-01 RX ADMIN — LIDOCAINE 2 PATCH: 246 PATCH TOPICAL at 12:24

## 2023-01-01 RX ADMIN — ASPIRIN 81 MG: 81 TABLET, COATED ORAL at 19:43

## 2023-01-01 RX ADMIN — OXYCODONE HYDROCHLORIDE 5 MG: 100 SOLUTION ORAL at 09:40

## 2023-01-01 RX ADMIN — SENNOSIDES AND DOCUSATE SODIUM 1 TABLET: 8.6; 5 TABLET ORAL at 09:09

## 2023-01-01 RX ADMIN — SENNOSIDES AND DOCUSATE SODIUM 1 TABLET: 8.6; 5 TABLET ORAL at 08:53

## 2023-01-01 RX ADMIN — OXYCODONE HYDROCHLORIDE 5 MG: 100 SOLUTION ORAL at 14:05

## 2023-01-01 RX ADMIN — HYDROMORPHONE HYDROCHLORIDE 0.25 MG: 1 INJECTION, SOLUTION INTRAMUSCULAR; INTRAVENOUS; SUBCUTANEOUS at 13:38

## 2023-01-01 RX ADMIN — POLYETHYLENE GLYCOL 3350 17 G: 17 POWDER, FOR SOLUTION ORAL at 09:27

## 2023-01-01 RX ADMIN — LIDOCAINE 2 PATCH: 246 PATCH TOPICAL at 12:50

## 2023-01-01 RX ADMIN — ACETAMINOPHEN 650 MG: 325 TABLET ORAL at 01:05

## 2023-01-01 RX ADMIN — HYDROXYZINE HYDROCHLORIDE 10 MG: 10 TABLET ORAL at 16:44

## 2023-01-01 RX ADMIN — CARBIDOPA AND LEVODOPA 1 HALF-TAB: 25; 100 TABLET ORAL at 13:30

## 2023-01-01 RX ADMIN — LIDOCAINE 2 PATCH: 246 PATCH TOPICAL at 13:33

## 2023-01-01 RX ADMIN — OXYCODONE HYDROCHLORIDE 5 MG: 5 TABLET ORAL at 09:50

## 2023-01-01 RX ADMIN — CARBIDOPA AND LEVODOPA 1 HALF-TAB: 25; 100 TABLET ORAL at 21:05

## 2023-01-01 RX ADMIN — CARBIDOPA AND LEVODOPA 1 HALF-TAB: 25; 100 TABLET ORAL at 09:22

## 2023-01-01 RX ADMIN — ASPIRIN 81 MG: 81 TABLET, COATED ORAL at 07:51

## 2023-01-01 RX ADMIN — GABAPENTIN 100 MG: 100 CAPSULE ORAL at 08:43

## 2023-01-01 RX ADMIN — OXYCODONE HYDROCHLORIDE 5 MG: 100 SOLUTION ORAL at 22:19

## 2023-01-01 RX ADMIN — LIDOCAINE 2 PATCH: 246 PATCH TOPICAL at 12:56

## 2023-01-01 RX ADMIN — GABAPENTIN 100 MG: 100 CAPSULE ORAL at 14:05

## 2023-01-01 RX ADMIN — DULOXETINE 60 MG: 60 CAPSULE, DELAYED RELEASE ORAL at 09:44

## 2023-01-01 RX ADMIN — OXYCODONE HYDROCHLORIDE 5 MG: 100 SOLUTION ORAL at 23:03

## 2023-01-01 RX ADMIN — CARBIDOPA AND LEVODOPA 1 HALF-TAB: 25; 100 TABLET ORAL at 09:09

## 2023-01-01 RX ADMIN — LEVOTHYROXINE SODIUM 100 MCG: 0.05 TABLET ORAL at 09:52

## 2023-01-01 RX ADMIN — SENNOSIDES AND DOCUSATE SODIUM 1 TABLET: 8.6; 5 TABLET ORAL at 19:43

## 2023-01-01 RX ADMIN — OXYCODONE HYDROCHLORIDE 5 MG: 5 TABLET ORAL at 20:00

## 2023-01-01 RX ADMIN — Medication 5 MG: at 07:46

## 2023-01-01 RX ADMIN — GABAPENTIN 100 MG: 100 CAPSULE ORAL at 13:58

## 2023-01-01 RX ADMIN — OXYCODONE HYDROCHLORIDE 5 MG: 100 SOLUTION ORAL at 03:43

## 2023-01-01 RX ADMIN — SENNOSIDES AND DOCUSATE SODIUM 1 TABLET: 8.6; 5 TABLET ORAL at 11:39

## 2023-01-01 RX ADMIN — OLANZAPINE 5 MG: 5 TABLET, ORALLY DISINTEGRATING ORAL at 09:27

## 2023-01-01 RX ADMIN — GABAPENTIN 100 MG: 100 CAPSULE ORAL at 10:02

## 2023-01-01 RX ADMIN — HYDROMORPHONE HYDROCHLORIDE 0.4 MG: 0.2 INJECTION, SOLUTION INTRAMUSCULAR; INTRAVENOUS; SUBCUTANEOUS at 16:45

## 2023-01-01 RX ADMIN — OXYCODONE HYDROCHLORIDE 10 MG: 100 SOLUTION ORAL at 08:22

## 2023-01-01 RX ADMIN — GABAPENTIN 100 MG: 100 CAPSULE ORAL at 20:00

## 2023-01-01 RX ADMIN — Medication 50 MCG: at 08:32

## 2023-01-01 RX ADMIN — SENNOSIDES AND DOCUSATE SODIUM 1 TABLET: 8.6; 5 TABLET ORAL at 10:07

## 2023-01-01 RX ADMIN — HYDRALAZINE HYDROCHLORIDE 10 MG: 10 TABLET ORAL at 09:22

## 2023-01-01 RX ADMIN — DULOXETINE 60 MG: 60 CAPSULE, DELAYED RELEASE ORAL at 10:17

## 2023-01-01 RX ADMIN — CARBIDOPA AND LEVODOPA 1 HALF-TAB: 25; 100 TABLET ORAL at 10:07

## 2023-01-01 RX ADMIN — LIDOCAINE 2 PATCH: 246 PATCH TOPICAL at 11:30

## 2023-01-01 RX ADMIN — LIDOCAINE 2 PATCH: 246 PATCH TOPICAL at 13:29

## 2023-01-01 RX ADMIN — Medication 5 MG: at 16:20

## 2023-01-01 RX ADMIN — OLANZAPINE 5 MG: 5 TABLET, ORALLY DISINTEGRATING ORAL at 20:15

## 2023-01-01 RX ADMIN — SODIUM CHLORIDE: 9 INJECTION, SOLUTION INTRAVENOUS at 16:48

## 2023-01-01 RX ADMIN — HALOPERIDOL 2 MG: 2 SOLUTION ORAL at 18:52

## 2023-01-01 RX ADMIN — OLANZAPINE 5 MG: 5 TABLET, ORALLY DISINTEGRATING ORAL at 20:08

## 2023-01-01 RX ADMIN — OXYCODONE HYDROCHLORIDE 10 MG: 100 SOLUTION ORAL at 19:58

## 2023-01-01 RX ADMIN — CARBIDOPA AND LEVODOPA 1 HALF-TAB: 25; 100 TABLET ORAL at 21:19

## 2023-01-01 RX ADMIN — OXYCODONE HYDROCHLORIDE 5 MG: 100 SOLUTION ORAL at 14:03

## 2023-01-01 RX ADMIN — CARBIDOPA AND LEVODOPA 1 HALF-TAB: 25; 100 TABLET ORAL at 20:01

## 2023-01-01 RX ADMIN — OLANZAPINE 5 MG: 5 TABLET, ORALLY DISINTEGRATING ORAL at 08:34

## 2023-01-01 RX ADMIN — Medication 5 MG: at 21:54

## 2023-01-01 RX ADMIN — SENNOSIDES AND DOCUSATE SODIUM 1 TABLET: 8.6; 5 TABLET ORAL at 09:26

## 2023-01-01 RX ADMIN — Medication 5 MG: at 04:21

## 2023-01-01 RX ADMIN — OXYCODONE HYDROCHLORIDE 5 MG: 5 TABLET ORAL at 19:42

## 2023-01-01 RX ADMIN — LIDOCAINE 2 PATCH: 246 PATCH TOPICAL at 13:02

## 2023-01-01 RX ADMIN — ASPIRIN 81 MG: 81 TABLET, COATED ORAL at 00:53

## 2023-01-01 RX ADMIN — LEVOTHYROXINE SODIUM 100 MCG: 0.05 TABLET ORAL at 09:01

## 2023-01-01 RX ADMIN — GABAPENTIN 100 MG: 100 CAPSULE ORAL at 14:17

## 2023-01-01 RX ADMIN — Medication 2 G: at 19:28

## 2023-01-01 RX ADMIN — QUETIAPINE FUMARATE 12.5 MG: 25 TABLET ORAL at 00:28

## 2023-01-01 RX ADMIN — OXYCODONE HYDROCHLORIDE 5 MG: 100 SOLUTION ORAL at 07:06

## 2023-01-01 RX ADMIN — DULOXETINE 60 MG: 60 CAPSULE, DELAYED RELEASE ORAL at 07:51

## 2023-01-01 RX ADMIN — GABAPENTIN 100 MG: 100 CAPSULE ORAL at 20:39

## 2023-01-01 RX ADMIN — ASPIRIN 81 MG: 81 TABLET, COATED ORAL at 20:55

## 2023-01-01 RX ADMIN — PHENYLEPHRINE HYDROCHLORIDE 100 MCG: 10 INJECTION INTRAVENOUS at 20:49

## 2023-01-01 RX ADMIN — OXYCODONE HYDROCHLORIDE 5 MG: 100 SOLUTION ORAL at 05:50

## 2023-01-01 RX ADMIN — OXYCODONE HYDROCHLORIDE 5 MG: 100 SOLUTION ORAL at 18:14

## 2023-01-01 RX ADMIN — ASPIRIN 81 MG: 81 TABLET, COATED ORAL at 09:38

## 2023-01-01 RX ADMIN — CARBIDOPA AND LEVODOPA 1 HALF-TAB: 25; 100 TABLET ORAL at 14:05

## 2023-01-01 RX ADMIN — Medication 5 MG: at 16:46

## 2023-01-01 RX ADMIN — OXYCODONE HYDROCHLORIDE 5 MG: 100 SOLUTION ORAL at 17:18

## 2023-01-01 RX ADMIN — OXYCODONE HYDROCHLORIDE 5 MG: 100 SOLUTION ORAL at 04:26

## 2023-01-01 RX ADMIN — ACETAMINOPHEN 975 MG: 325 TABLET ORAL at 14:30

## 2023-01-01 RX ADMIN — SENNOSIDES AND DOCUSATE SODIUM 1 TABLET: 8.6; 5 TABLET ORAL at 20:23

## 2023-01-01 RX ADMIN — OLANZAPINE 5 MG: 5 TABLET, ORALLY DISINTEGRATING ORAL at 20:00

## 2023-01-01 RX ADMIN — OLANZAPINE 5 MG: 5 TABLET, ORALLY DISINTEGRATING ORAL at 09:44

## 2023-01-01 RX ADMIN — CARBIDOPA AND LEVODOPA 1 HALF-TAB: 25; 100 TABLET ORAL at 13:12

## 2023-01-01 RX ADMIN — CARBIDOPA AND LEVODOPA 1 HALF-TAB: 25; 100 TABLET ORAL at 08:02

## 2023-01-01 RX ADMIN — HYDRALAZINE HYDROCHLORIDE 10 MG: 10 TABLET ORAL at 20:11

## 2023-01-01 RX ADMIN — HYDRALAZINE HYDROCHLORIDE 10 MG: 10 TABLET ORAL at 13:34

## 2023-01-01 RX ADMIN — OLANZAPINE 5 MG: 5 TABLET, ORALLY DISINTEGRATING ORAL at 20:39

## 2023-01-01 RX ADMIN — LEVOTHYROXINE SODIUM 100 MCG: 0.05 TABLET ORAL at 12:21

## 2023-01-01 RX ADMIN — CARBIDOPA AND LEVODOPA 1 HALF-TAB: 25; 100 TABLET ORAL at 09:33

## 2023-01-01 RX ADMIN — OXYCODONE HYDROCHLORIDE 5 MG: 5 TABLET ORAL at 20:03

## 2023-01-01 RX ADMIN — CARBIDOPA AND LEVODOPA 1 HALF-TAB: 25; 100 TABLET ORAL at 20:23

## 2023-01-01 RX ADMIN — LOSARTAN POTASSIUM 50 MG: 50 TABLET, FILM COATED ORAL at 09:26

## 2023-01-01 RX ADMIN — CARBIDOPA AND LEVODOPA 1 HALF-TAB: 25; 100 TABLET ORAL at 09:02

## 2023-01-01 RX ADMIN — Medication 5 MG: at 04:30

## 2023-01-01 RX ADMIN — CARBIDOPA AND LEVODOPA 1 HALF-TAB: 25; 100 TABLET ORAL at 09:38

## 2023-01-01 RX ADMIN — Medication 5 MG: at 10:13

## 2023-01-01 RX ADMIN — POLYETHYLENE GLYCOL 3350 17 G: 17 POWDER, FOR SOLUTION ORAL at 07:49

## 2023-01-01 RX ADMIN — OXYCODONE HYDROCHLORIDE 5 MG: 5 TABLET ORAL at 09:38

## 2023-01-01 RX ADMIN — ONDANSETRON 4 MG: 4 TABLET, ORALLY DISINTEGRATING ORAL at 23:56

## 2023-01-01 RX ADMIN — SENNOSIDES AND DOCUSATE SODIUM 1 TABLET: 8.6; 5 TABLET ORAL at 20:03

## 2023-01-01 RX ADMIN — GABAPENTIN 100 MG: 100 CAPSULE ORAL at 20:01

## 2023-01-01 RX ADMIN — OXYCODONE HYDROCHLORIDE 5 MG: 100 SOLUTION ORAL at 11:25

## 2023-01-01 RX ADMIN — CARBIDOPA AND LEVODOPA 1 HALF-TAB: 25; 100 TABLET ORAL at 12:47

## 2023-01-01 RX ADMIN — Medication 5 MG: at 09:33

## 2023-01-01 RX ADMIN — PROPOFOL 80 MG: 10 INJECTION, EMULSION INTRAVENOUS at 19:33

## 2023-01-01 RX ADMIN — GABAPENTIN 100 MG: 100 CAPSULE ORAL at 09:16

## 2023-01-01 RX ADMIN — OLANZAPINE 5 MG: 5 TABLET, ORALLY DISINTEGRATING ORAL at 10:07

## 2023-01-01 RX ADMIN — Medication 5 MG: at 03:38

## 2023-01-01 RX ADMIN — OXYCODONE HYDROCHLORIDE 5 MG: 100 SOLUTION ORAL at 00:22

## 2023-01-01 RX ADMIN — OXYCODONE HYDROCHLORIDE 10 MG: 100 SOLUTION ORAL at 10:02

## 2023-01-01 RX ADMIN — Medication 5 MG: at 03:55

## 2023-01-01 RX ADMIN — OXYCODONE HYDROCHLORIDE 5 MG: 100 SOLUTION ORAL at 13:42

## 2023-01-01 RX ADMIN — CARBIDOPA AND LEVODOPA 1 HALF-TAB: 25; 100 TABLET ORAL at 13:53

## 2023-01-01 RX ADMIN — CARBIDOPA AND LEVODOPA 1 TABLET: 25; 100 TABLET ORAL at 06:50

## 2023-01-01 RX ADMIN — CARBIDOPA AND LEVODOPA 1 HALF-TAB: 25; 100 TABLET ORAL at 20:16

## 2023-01-01 RX ADMIN — LOSARTAN POTASSIUM 50 MG: 50 TABLET, FILM COATED ORAL at 10:01

## 2023-01-01 RX ADMIN — OLANZAPINE 5 MG: 5 TABLET, ORALLY DISINTEGRATING ORAL at 07:47

## 2023-01-01 RX ADMIN — LEVOTHYROXINE SODIUM 100 MCG: 0.05 TABLET ORAL at 09:16

## 2023-01-01 RX ADMIN — GABAPENTIN 100 MG: 100 CAPSULE ORAL at 22:14

## 2023-01-01 RX ADMIN — Medication 5 MG: at 15:35

## 2023-01-01 RX ADMIN — CARBIDOPA AND LEVODOPA 1 HALF-TAB: 25; 100 TABLET ORAL at 09:30

## 2023-01-01 RX ADMIN — SENNOSIDES AND DOCUSATE SODIUM 1 TABLET: 8.6; 5 TABLET ORAL at 07:40

## 2023-01-01 RX ADMIN — ACETAMINOPHEN 975 MG: 325 TABLET ORAL at 07:40

## 2023-01-01 RX ADMIN — KETAMINE HYDROCHLORIDE 10 MG: 10 INJECTION INTRAMUSCULAR; INTRAVENOUS at 20:05

## 2023-01-01 RX ADMIN — DULOXETINE 60 MG: 60 CAPSULE, DELAYED RELEASE ORAL at 09:50

## 2023-01-01 RX ADMIN — OXYCODONE HYDROCHLORIDE 5 MG: 5 TABLET ORAL at 13:58

## 2023-01-01 RX ADMIN — GABAPENTIN 100 MG: 100 CAPSULE ORAL at 22:43

## 2023-01-01 RX ADMIN — CARBIDOPA AND LEVODOPA 1 HALF-TAB: 25; 100 TABLET ORAL at 13:59

## 2023-01-01 RX ADMIN — OXYCODONE HYDROCHLORIDE 5 MG: 100 SOLUTION ORAL at 23:39

## 2023-01-01 RX ADMIN — DULOXETINE 60 MG: 60 CAPSULE, DELAYED RELEASE ORAL at 12:21

## 2023-01-01 RX ADMIN — OXYCODONE HYDROCHLORIDE 5 MG: 100 SOLUTION ORAL at 19:59

## 2023-01-01 RX ADMIN — OXYCODONE HYDROCHLORIDE 10 MG: 100 SOLUTION ORAL at 13:29

## 2023-01-01 RX ADMIN — OXYCODONE HYDROCHLORIDE 10 MG: 100 SOLUTION ORAL at 12:36

## 2023-01-01 RX ADMIN — HALOPERIDOL 2 MG: 2 SOLUTION ORAL at 06:45

## 2023-01-01 RX ADMIN — GABAPENTIN 100 MG: 100 CAPSULE ORAL at 20:03

## 2023-01-01 RX ADMIN — Medication 50 MCG: at 09:52

## 2023-01-01 RX ADMIN — OLANZAPINE 5 MG: 5 TABLET, ORALLY DISINTEGRATING ORAL at 09:51

## 2023-01-01 RX ADMIN — CARBIDOPA AND LEVODOPA 1 HALF-TAB: 25; 100 TABLET ORAL at 20:15

## 2023-01-01 RX ADMIN — CARBIDOPA AND LEVODOPA 1 HALF-TAB: 25; 100 TABLET ORAL at 22:13

## 2023-01-01 RX ADMIN — FENTANYL CITRATE 25 MCG: 50 INJECTION INTRAMUSCULAR; INTRAVENOUS at 21:52

## 2023-01-01 RX ADMIN — GABAPENTIN 100 MG: 100 CAPSULE ORAL at 20:15

## 2023-01-01 RX ADMIN — OXYCODONE HYDROCHLORIDE 10 MG: 100 SOLUTION ORAL at 11:32

## 2023-01-01 RX ADMIN — GABAPENTIN 100 MG: 100 CAPSULE ORAL at 08:52

## 2023-01-01 RX ADMIN — OXYCODONE HYDROCHLORIDE 5 MG: 100 SOLUTION ORAL at 11:31

## 2023-01-01 RX ADMIN — CARBIDOPA AND LEVODOPA 1 HALF-TAB: 25; 100 TABLET ORAL at 20:31

## 2023-01-01 RX ADMIN — ACETAMINOPHEN 650 MG: 325 TABLET ORAL at 12:47

## 2023-01-01 RX ADMIN — SENNOSIDES AND DOCUSATE SODIUM 1 TABLET: 8.6; 5 TABLET ORAL at 09:52

## 2023-01-01 RX ADMIN — CARBIDOPA AND LEVODOPA 1 HALF-TAB: 25; 100 TABLET ORAL at 20:20

## 2023-01-01 RX ADMIN — DULOXETINE 60 MG: 60 CAPSULE, DELAYED RELEASE ORAL at 09:45

## 2023-01-01 RX ADMIN — OLANZAPINE 5 MG: 5 TABLET, ORALLY DISINTEGRATING ORAL at 21:44

## 2023-01-01 RX ADMIN — SODIUM CHLORIDE, POTASSIUM CHLORIDE, SODIUM LACTATE AND CALCIUM CHLORIDE: 600; 310; 30; 20 INJECTION, SOLUTION INTRAVENOUS at 00:33

## 2023-01-01 RX ADMIN — CARBIDOPA AND LEVODOPA 1 HALF-TAB: 25; 100 TABLET ORAL at 08:34

## 2023-01-01 RX ADMIN — LEVOTHYROXINE SODIUM 100 MCG: 0.05 TABLET ORAL at 10:22

## 2023-01-01 RX ADMIN — LEVOTHYROXINE SODIUM 100 MCG: 0.05 TABLET ORAL at 10:04

## 2023-01-01 RX ADMIN — GABAPENTIN 100 MG: 100 CAPSULE ORAL at 09:01

## 2023-01-01 RX ADMIN — Medication 5 MG: at 04:56

## 2023-01-01 RX ADMIN — SENNOSIDES AND DOCUSATE SODIUM 1 TABLET: 8.6; 5 TABLET ORAL at 19:42

## 2023-01-01 RX ADMIN — LEVOTHYROXINE SODIUM 100 MCG: 0.05 TABLET ORAL at 09:09

## 2023-01-01 RX ADMIN — DULOXETINE 60 MG: 60 CAPSULE, DELAYED RELEASE ORAL at 10:21

## 2023-01-01 RX ADMIN — OXYCODONE HYDROCHLORIDE 10 MG: 100 SOLUTION ORAL at 12:28

## 2023-01-01 RX ADMIN — GABAPENTIN 100 MG: 100 CAPSULE ORAL at 20:11

## 2023-01-01 RX ADMIN — Medication 5 MG: at 04:16

## 2023-01-01 RX ADMIN — HYDROXYZINE HYDROCHLORIDE 10 MG: 10 TABLET ORAL at 16:51

## 2023-01-01 RX ADMIN — LOSARTAN POTASSIUM 50 MG: 50 TABLET, FILM COATED ORAL at 11:41

## 2023-01-01 RX ADMIN — OXYCODONE HYDROCHLORIDE 5 MG: 100 SOLUTION ORAL at 22:31

## 2023-01-01 RX ADMIN — CARBIDOPA AND LEVODOPA 1 HALF-TAB: 25; 100 TABLET ORAL at 09:08

## 2023-01-01 RX ADMIN — CARBIDOPA AND LEVODOPA 1 HALF-TAB: 25; 100 TABLET ORAL at 10:22

## 2023-01-01 RX ADMIN — ACETAMINOPHEN 975 MG: 325 TABLET ORAL at 17:39

## 2023-01-01 RX ADMIN — PHENYLEPHRINE HYDROCHLORIDE 100 MCG: 10 INJECTION INTRAVENOUS at 20:19

## 2023-01-01 RX ADMIN — CARBIDOPA AND LEVODOPA 1 HALF-TAB: 25; 100 TABLET ORAL at 12:58

## 2023-01-01 RX ADMIN — LEVOTHYROXINE SODIUM 100 MCG: 0.05 TABLET ORAL at 10:07

## 2023-01-01 RX ADMIN — LEVOTHYROXINE SODIUM 100 MCG: 0.05 TABLET ORAL at 10:17

## 2023-01-01 RX ADMIN — OXYCODONE HYDROCHLORIDE 5 MG: 100 SOLUTION ORAL at 14:17

## 2023-01-01 RX ADMIN — GABAPENTIN 100 MG: 100 CAPSULE ORAL at 07:47

## 2023-01-01 RX ADMIN — ACETAMINOPHEN 650 MG: 325 TABLET ORAL at 13:53

## 2023-01-01 RX ADMIN — ACETAMINOPHEN 650 MG: 325 TABLET ORAL at 03:29

## 2023-01-01 RX ADMIN — OLANZAPINE 5 MG: 5 TABLET, ORALLY DISINTEGRATING ORAL at 21:17

## 2023-01-01 RX ADMIN — Medication 5 MG: at 09:45

## 2023-01-01 RX ADMIN — LOSARTAN POTASSIUM 50 MG: 50 TABLET, FILM COATED ORAL at 09:22

## 2023-01-01 RX ADMIN — ANORECTAL OINTMENT: 15.7; .44; 24; 20.6 OINTMENT TOPICAL at 03:24

## 2023-01-01 RX ADMIN — OXYCODONE HYDROCHLORIDE 5 MG: 100 SOLUTION ORAL at 21:16

## 2023-01-01 RX ADMIN — DULOXETINE 60 MG: 60 CAPSULE, DELAYED RELEASE ORAL at 10:09

## 2023-01-01 RX ADMIN — LEVOTHYROXINE SODIUM 100 MCG: 0.05 TABLET ORAL at 08:02

## 2023-01-01 RX ADMIN — GABAPENTIN 100 MG: 100 CAPSULE ORAL at 20:23

## 2023-01-01 RX ADMIN — Medication 5 MG: at 21:55

## 2023-01-01 RX ADMIN — Medication 5 MG: at 03:49

## 2023-01-01 RX ADMIN — HYDRALAZINE HYDROCHLORIDE 10 MG: 10 TABLET ORAL at 21:31

## 2023-01-01 RX ADMIN — OXYCODONE HYDROCHLORIDE 5 MG: 100 SOLUTION ORAL at 08:34

## 2023-01-01 RX ADMIN — GABAPENTIN 100 MG: 100 CAPSULE ORAL at 21:22

## 2023-01-01 RX ADMIN — CARBIDOPA AND LEVODOPA 1 HALF-TAB: 25; 100 TABLET ORAL at 12:13

## 2023-01-01 RX ADMIN — LEVOTHYROXINE SODIUM 100 MCG: 0.05 TABLET ORAL at 11:33

## 2023-01-01 RX ADMIN — HYDRALAZINE HYDROCHLORIDE 10 MG: 10 TABLET ORAL at 20:03

## 2023-01-01 RX ADMIN — OLANZAPINE 5 MG: 5 TABLET, ORALLY DISINTEGRATING ORAL at 20:05

## 2023-01-01 RX ADMIN — LIDOCAINE 2 PATCH: 246 PATCH TOPICAL at 12:31

## 2023-01-01 RX ADMIN — Medication 5 MG: at 10:17

## 2023-01-01 RX ADMIN — CARBIDOPA AND LEVODOPA 1 HALF-TAB: 25; 100 TABLET ORAL at 22:02

## 2023-01-01 RX ADMIN — OXYCODONE HYDROCHLORIDE 5 MG: 100 SOLUTION ORAL at 06:57

## 2023-01-01 RX ADMIN — Medication 5 MG: at 16:42

## 2023-01-01 RX ADMIN — OXYCODONE HYDROCHLORIDE 5 MG: 5 TABLET ORAL at 13:33

## 2023-01-01 RX ADMIN — CARBIDOPA AND LEVODOPA 1 TABLET: 25; 100 TABLET ORAL at 13:56

## 2023-01-01 RX ADMIN — CARBIDOPA AND LEVODOPA 1 HALF-TAB: 25; 100 TABLET ORAL at 14:30

## 2023-01-01 RX ADMIN — SENNOSIDES AND DOCUSATE SODIUM 1 TABLET: 8.6; 5 TABLET ORAL at 09:04

## 2023-01-01 RX ADMIN — PHENYLEPHRINE HYDROCHLORIDE 100 MCG: 10 INJECTION INTRAVENOUS at 19:43

## 2023-01-01 RX ADMIN — Medication 5 MG: at 09:20

## 2023-01-01 RX ADMIN — SENNOSIDES AND DOCUSATE SODIUM 1 TABLET: 50; 8.6 TABLET ORAL at 08:32

## 2023-01-01 RX ADMIN — OXYCODONE HYDROCHLORIDE 5 MG: 100 SOLUTION ORAL at 12:12

## 2023-01-01 RX ADMIN — ASPIRIN 81 MG: 81 TABLET, COATED ORAL at 07:40

## 2023-01-01 RX ADMIN — OXYCODONE HYDROCHLORIDE 10 MG: 100 SOLUTION ORAL at 14:19

## 2023-01-01 RX ADMIN — GABAPENTIN 100 MG: 100 CAPSULE ORAL at 13:40

## 2023-01-01 RX ADMIN — LEVOTHYROXINE SODIUM 100 MCG: 0.05 TABLET ORAL at 08:34

## 2023-01-01 RX ADMIN — LIDOCAINE 2 PATCH: 246 PATCH TOPICAL at 12:47

## 2023-01-01 RX ADMIN — LIDOCAINE 2 PATCH: 246 PATCH TOPICAL at 12:58

## 2023-01-01 RX ADMIN — ACETAMINOPHEN 650 MG: 325 TABLET ORAL at 18:47

## 2023-01-01 RX ADMIN — OXYCODONE HYDROCHLORIDE 10 MG: 5 TABLET ORAL at 02:29

## 2023-01-01 RX ADMIN — CARBIDOPA AND LEVODOPA 1 HALF-TAB: 25; 100 TABLET ORAL at 09:51

## 2023-01-01 RX ADMIN — CARBIDOPA AND LEVODOPA 1 HALF-TAB: 25; 100 TABLET ORAL at 20:39

## 2023-01-01 RX ADMIN — LEVOTHYROXINE SODIUM 100 MCG: 0.05 TABLET ORAL at 07:51

## 2023-01-01 RX ADMIN — ACETAMINOPHEN 975 MG: 325 TABLET ORAL at 08:32

## 2023-01-01 RX ADMIN — CARBIDOPA AND LEVODOPA 1 TABLET: 25; 100 TABLET ORAL at 06:36

## 2023-01-01 RX ADMIN — GABAPENTIN 100 MG: 100 CAPSULE ORAL at 09:09

## 2023-01-01 RX ADMIN — CARBIDOPA AND LEVODOPA 1 TABLET: 25; 100 TABLET ORAL at 13:34

## 2023-01-01 RX ADMIN — ACETAMINOPHEN 650 MG: 325 TABLET ORAL at 14:27

## 2023-01-01 RX ADMIN — GABAPENTIN 100 MG: 100 CAPSULE ORAL at 13:54

## 2023-01-01 RX ADMIN — CARBIDOPA AND LEVODOPA 1 HALF-TAB: 25; 100 TABLET ORAL at 13:32

## 2023-01-01 RX ADMIN — SUGAMMADEX 150 MG: 100 INJECTION, SOLUTION INTRAVENOUS at 23:01

## 2023-01-01 RX ADMIN — CARBIDOPA AND LEVODOPA 1 HALF-TAB: 25; 100 TABLET ORAL at 13:44

## 2023-01-01 RX ADMIN — GABAPENTIN 100 MG: 100 CAPSULE ORAL at 09:38

## 2023-01-01 RX ADMIN — OXYCODONE HYDROCHLORIDE 5 MG: 100 SOLUTION ORAL at 22:14

## 2023-01-01 RX ADMIN — PHENYLEPHRINE HYDROCHLORIDE 50 MCG: 10 INJECTION INTRAVENOUS at 20:41

## 2023-01-01 RX ADMIN — SENNOSIDES AND DOCUSATE SODIUM 1 TABLET: 8.6; 5 TABLET ORAL at 08:32

## 2023-01-01 RX ADMIN — CARBIDOPA AND LEVODOPA 1 HALF-TAB: 25; 100 TABLET ORAL at 08:52

## 2023-01-01 RX ADMIN — QUETIAPINE FUMARATE 12.5 MG: 25 TABLET ORAL at 16:45

## 2023-01-01 RX ADMIN — OLANZAPINE 5 MG: 5 TABLET, ORALLY DISINTEGRATING ORAL at 20:23

## 2023-01-01 RX ADMIN — CARBIDOPA AND LEVODOPA 1 HALF-TAB: 25; 100 TABLET ORAL at 16:28

## 2023-01-01 RX ADMIN — OXYCODONE HYDROCHLORIDE 5 MG: 100 SOLUTION ORAL at 17:10

## 2023-01-01 RX ADMIN — QUETIAPINE FUMARATE 25 MG: 25 TABLET ORAL at 21:26

## 2023-01-01 RX ADMIN — LEVOTHYROXINE SODIUM 100 MCG: 0.05 TABLET ORAL at 09:45

## 2023-01-01 RX ADMIN — GABAPENTIN 100 MG: 100 CAPSULE ORAL at 14:07

## 2023-01-01 RX ADMIN — LIDOCAINE 2 PATCH: 246 PATCH TOPICAL at 11:34

## 2023-01-01 RX ADMIN — SENNOSIDES AND DOCUSATE SODIUM 1 TABLET: 8.6; 5 TABLET ORAL at 19:57

## 2023-01-01 RX ADMIN — HYDRALAZINE HYDROCHLORIDE 10 MG: 10 TABLET ORAL at 13:57

## 2023-01-01 RX ADMIN — POLYETHYLENE GLYCOL 3350 17 G: 17 POWDER, FOR SOLUTION ORAL at 20:19

## 2023-01-01 RX ADMIN — OXYCODONE HYDROCHLORIDE 5 MG: 100 SOLUTION ORAL at 03:38

## 2023-01-01 RX ADMIN — ACETAMINOPHEN 975 MG: 325 TABLET ORAL at 00:03

## 2023-01-01 RX ADMIN — GABAPENTIN 100 MG: 100 CAPSULE ORAL at 13:20

## 2023-01-01 RX ADMIN — OXYCODONE HYDROCHLORIDE 10 MG: 100 SOLUTION ORAL at 18:47

## 2023-01-01 RX ADMIN — OXYCODONE HYDROCHLORIDE 5 MG: 100 SOLUTION ORAL at 01:31

## 2023-01-01 RX ADMIN — ACETAMINOPHEN 650 MG: 325 TABLET ORAL at 10:42

## 2023-01-01 RX ADMIN — OLANZAPINE 5 MG: 5 TABLET, ORALLY DISINTEGRATING ORAL at 21:05

## 2023-01-01 RX ADMIN — OXYCODONE HYDROCHLORIDE 5 MG: 100 SOLUTION ORAL at 18:28

## 2023-01-01 RX ADMIN — GABAPENTIN 100 MG: 100 CAPSULE ORAL at 16:28

## 2023-01-01 RX ADMIN — GABAPENTIN 300 MG: 300 CAPSULE ORAL at 21:31

## 2023-01-01 RX ADMIN — HYDRALAZINE HYDROCHLORIDE 10 MG: 10 TABLET ORAL at 08:35

## 2023-01-01 RX ADMIN — CARBIDOPA AND LEVODOPA 1 HALF-TAB: 25; 100 TABLET ORAL at 14:14

## 2023-01-01 RX ADMIN — CARBIDOPA AND LEVODOPA 1 HALF-TAB: 25; 100 TABLET ORAL at 21:22

## 2023-01-01 RX ADMIN — GABAPENTIN 100 MG: 100 CAPSULE ORAL at 09:44

## 2023-01-01 RX ADMIN — SENNOSIDES AND DOCUSATE SODIUM 1 TABLET: 8.6; 5 TABLET ORAL at 21:17

## 2023-01-01 RX ADMIN — ASPIRIN 81 MG: 81 TABLET, COATED ORAL at 09:51

## 2023-01-01 RX ADMIN — ASPIRIN 81 MG: 81 TABLET, COATED ORAL at 20:02

## 2023-01-01 RX ADMIN — POLYETHYLENE GLYCOL 3350 17 G: 17 POWDER, FOR SOLUTION ORAL at 09:53

## 2023-01-01 RX ADMIN — LEVOTHYROXINE SODIUM 100 MCG: 0.05 TABLET ORAL at 09:38

## 2023-01-01 RX ADMIN — BISACODYL 10 MG: 10 SUPPOSITORY RECTAL at 12:24

## 2023-01-01 RX ADMIN — GABAPENTIN 100 MG: 100 CAPSULE ORAL at 15:39

## 2023-01-01 RX ADMIN — OXYCODONE HYDROCHLORIDE 5 MG: 100 SOLUTION ORAL at 05:45

## 2023-01-01 RX ADMIN — CARBIDOPA AND LEVODOPA 1 HALF-TAB: 25; 100 TABLET ORAL at 11:34

## 2023-01-01 RX ADMIN — GABAPENTIN 100 MG: 100 CAPSULE ORAL at 09:45

## 2023-01-01 RX ADMIN — ASPIRIN 81 MG: 81 TABLET, COATED ORAL at 21:17

## 2023-01-01 RX ADMIN — OXYCODONE HYDROCHLORIDE 5 MG: 100 SOLUTION ORAL at 16:46

## 2023-01-01 RX ADMIN — SENNOSIDES AND DOCUSATE SODIUM 1 TABLET: 8.6; 5 TABLET ORAL at 00:53

## 2023-01-01 RX ADMIN — GABAPENTIN 100 MG: 100 CAPSULE ORAL at 21:19

## 2023-01-01 RX ADMIN — OXYCODONE HYDROCHLORIDE 5 MG: 100 SOLUTION ORAL at 03:05

## 2023-01-01 RX ADMIN — CARBIDOPA AND LEVODOPA 1 HALF-TAB: 25; 100 TABLET ORAL at 10:00

## 2023-01-01 RX ADMIN — LIDOCAINE HYDROCHLORIDE 20 MG: 10 INJECTION, SOLUTION INFILTRATION; PERINEURAL at 19:33

## 2023-01-01 RX ADMIN — CARBIDOPA AND LEVODOPA 1 HALF-TAB: 25; 100 TABLET ORAL at 13:29

## 2023-01-01 RX ADMIN — OXYCODONE HYDROCHLORIDE 5 MG: 5 TABLET ORAL at 20:18

## 2023-01-01 RX ADMIN — Medication 50 MCG: at 07:39

## 2023-01-01 RX ADMIN — OXYCODONE HYDROCHLORIDE 5 MG: 100 SOLUTION ORAL at 20:49

## 2023-01-01 RX ADMIN — GABAPENTIN 100 MG: 100 CAPSULE ORAL at 20:06

## 2023-01-01 RX ADMIN — LEVOTHYROXINE SODIUM 100 MCG: 0.05 TABLET ORAL at 08:32

## 2023-01-01 RX ADMIN — Medication 5 MG: at 05:51

## 2023-01-01 RX ADMIN — OLANZAPINE 5 MG: 5 TABLET, ORALLY DISINTEGRATING ORAL at 21:19

## 2023-01-01 RX ADMIN — QUETIAPINE FUMARATE 12.5 MG: 25 TABLET ORAL at 00:53

## 2023-01-01 RX ADMIN — ASPIRIN 81 MG: 81 TABLET, COATED ORAL at 09:22

## 2023-01-01 RX ADMIN — OXYCODONE HYDROCHLORIDE 5 MG: 100 SOLUTION ORAL at 03:54

## 2023-01-01 RX ADMIN — OXYCODONE HYDROCHLORIDE 5 MG: 100 SOLUTION ORAL at 18:59

## 2023-01-01 RX ADMIN — GABAPENTIN 100 MG: 100 CAPSULE ORAL at 14:30

## 2023-01-01 RX ADMIN — ACETAMINOPHEN 650 MG: 325 TABLET ORAL at 01:34

## 2023-01-01 RX ADMIN — PHENYLEPHRINE HYDROCHLORIDE 100 MCG: 10 INJECTION INTRAVENOUS at 20:52

## 2023-01-01 RX ADMIN — GABAPENTIN 100 MG: 100 CAPSULE ORAL at 20:18

## 2023-01-01 RX ADMIN — SENNOSIDES AND DOCUSATE SODIUM 1 TABLET: 8.6; 5 TABLET ORAL at 07:48

## 2023-01-01 RX ADMIN — OLANZAPINE 5 MG: 5 TABLET, ORALLY DISINTEGRATING ORAL at 18:09

## 2023-01-01 RX ADMIN — SENNOSIDES AND DOCUSATE SODIUM 1 TABLET: 8.6; 5 TABLET ORAL at 19:25

## 2023-01-01 RX ADMIN — POLYETHYLENE GLYCOL 3350 17 G: 17 POWDER, FOR SOLUTION ORAL at 09:22

## 2023-01-01 RX ADMIN — LIDOCAINE 2 PATCH: 246 PATCH TOPICAL at 17:00

## 2023-01-01 RX ADMIN — DULOXETINE 60 MG: 60 CAPSULE, DELAYED RELEASE ORAL at 09:26

## 2023-01-01 RX ADMIN — GABAPENTIN 100 MG: 100 CAPSULE ORAL at 09:27

## 2023-01-01 RX ADMIN — SENNOSIDES AND DOCUSATE SODIUM 1 TABLET: 8.6; 5 TABLET ORAL at 12:03

## 2023-01-01 RX ADMIN — GABAPENTIN 100 MG: 100 CAPSULE ORAL at 14:27

## 2023-01-01 RX ADMIN — ONDANSETRON 4 MG: 2 INJECTION INTRAMUSCULAR; INTRAVENOUS at 22:23

## 2023-01-01 RX ADMIN — GABAPENTIN 100 MG: 100 CAPSULE ORAL at 09:04

## 2023-01-01 RX ADMIN — OXYCODONE HYDROCHLORIDE 5 MG: 100 SOLUTION ORAL at 16:59

## 2023-01-01 RX ADMIN — CARBIDOPA AND LEVODOPA 1 HALF-TAB: 25; 100 TABLET ORAL at 20:06

## 2023-01-01 RX ADMIN — GABAPENTIN 100 MG: 100 CAPSULE ORAL at 21:17

## 2023-01-01 RX ADMIN — OLANZAPINE 5 MG: 5 TABLET, ORALLY DISINTEGRATING ORAL at 08:53

## 2023-01-01 RX ADMIN — CARBIDOPA AND LEVODOPA 1 TABLET: 25; 100 TABLET ORAL at 06:48

## 2023-01-01 RX ADMIN — CARBIDOPA AND LEVODOPA 1 HALF-TAB: 25; 100 TABLET ORAL at 21:44

## 2023-01-01 RX ADMIN — LEVOTHYROXINE SODIUM 100 MCG: 0.05 TABLET ORAL at 12:04

## 2023-01-01 RX ADMIN — GABAPENTIN 100 MG: 100 CAPSULE ORAL at 09:52

## 2023-01-01 RX ADMIN — GABAPENTIN 100 MG: 100 CAPSULE ORAL at 10:22

## 2023-01-01 RX ADMIN — OXYCODONE HYDROCHLORIDE 5 MG: 5 TABLET ORAL at 20:54

## 2023-01-01 RX ADMIN — GABAPENTIN 100 MG: 100 CAPSULE ORAL at 08:34

## 2023-01-01 RX ADMIN — OXYCODONE HYDROCHLORIDE 5 MG: 100 SOLUTION ORAL at 02:07

## 2023-01-01 RX ADMIN — DULOXETINE 60 MG: 60 CAPSULE, DELAYED RELEASE ORAL at 08:53

## 2023-01-01 RX ADMIN — ACETAMINOPHEN 975 MG: 325 TABLET ORAL at 17:13

## 2023-01-01 RX ADMIN — LIDOCAINE 1 PATCH: 4 PATCH TOPICAL at 13:31

## 2023-01-01 RX ADMIN — ACETAMINOPHEN 975 MG: 325 TABLET ORAL at 15:35

## 2023-01-01 RX ADMIN — LIDOCAINE 2 PATCH: 246 PATCH TOPICAL at 16:28

## 2023-01-01 RX ADMIN — GABAPENTIN 100 MG: 100 CAPSULE ORAL at 13:30

## 2023-01-01 RX ADMIN — OLANZAPINE 5 MG: 5 TABLET, ORALLY DISINTEGRATING ORAL at 08:43

## 2023-01-01 RX ADMIN — DULOXETINE 60 MG: 60 CAPSULE, DELAYED RELEASE ORAL at 09:22

## 2023-01-01 RX ADMIN — ACETAMINOPHEN 975 MG: 325 TABLET ORAL at 08:31

## 2023-01-01 RX ADMIN — CARBIDOPA AND LEVODOPA 1 HALF-TAB: 25; 100 TABLET ORAL at 08:53

## 2023-01-01 RX ADMIN — Medication 50 MCG: at 09:22

## 2023-01-01 RX ADMIN — ASPIRIN 81 MG: 81 TABLET, COATED ORAL at 19:59

## 2023-01-01 RX ADMIN — DULOXETINE 60 MG: 60 CAPSULE, DELAYED RELEASE ORAL at 12:03

## 2023-01-01 RX ADMIN — FENTANYL CITRATE 50 MCG: 50 INJECTION, SOLUTION INTRAMUSCULAR; INTRAVENOUS at 23:28

## 2023-01-01 RX ADMIN — SENNOSIDES AND DOCUSATE SODIUM 1 TABLET: 8.6; 5 TABLET ORAL at 07:51

## 2023-01-01 RX ADMIN — CARBIDOPA AND LEVODOPA 1 HALF-TAB: 25; 100 TABLET ORAL at 09:45

## 2023-01-01 RX ADMIN — GABAPENTIN 100 MG: 100 CAPSULE ORAL at 15:04

## 2023-01-01 RX ADMIN — Medication 5 MG: at 09:00

## 2023-01-01 RX ADMIN — DULOXETINE 60 MG: 60 CAPSULE, DELAYED RELEASE ORAL at 09:09

## 2023-01-01 RX ADMIN — ASPIRIN 81 MG: 81 TABLET, COATED ORAL at 08:31

## 2023-01-01 RX ADMIN — GABAPENTIN 100 MG: 100 CAPSULE ORAL at 09:22

## 2023-01-01 RX ADMIN — OXYCODONE HYDROCHLORIDE 5 MG: 100 SOLUTION ORAL at 14:23

## 2023-01-01 RX ADMIN — PHENYLEPHRINE HYDROCHLORIDE 50 MCG: 10 INJECTION INTRAVENOUS at 20:45

## 2023-01-01 RX ADMIN — OLANZAPINE 5 MG: 5 TABLET, ORALLY DISINTEGRATING ORAL at 10:01

## 2023-01-01 RX ADMIN — SENNOSIDES AND DOCUSATE SODIUM 1 TABLET: 8.6; 5 TABLET ORAL at 20:18

## 2023-01-01 RX ADMIN — DULOXETINE 60 MG: 60 CAPSULE, DELAYED RELEASE ORAL at 09:33

## 2023-01-01 RX ADMIN — CARBIDOPA AND LEVODOPA 1 TABLET: 25; 100 TABLET ORAL at 19:43

## 2023-01-01 RX ADMIN — Medication 5 MG: at 16:28

## 2023-01-01 RX ADMIN — HYDRALAZINE HYDROCHLORIDE 10 MG: 10 TABLET ORAL at 07:39

## 2023-01-01 RX ADMIN — GABAPENTIN 100 MG: 100 CAPSULE ORAL at 21:44

## 2023-01-01 RX ADMIN — OXYCODONE HYDROCHLORIDE 10 MG: 100 SOLUTION ORAL at 13:17

## 2023-01-01 RX ADMIN — LIDOCAINE 2 PATCH: 246 PATCH TOPICAL at 12:10

## 2023-01-01 RX ADMIN — GABAPENTIN 100 MG: 100 CAPSULE ORAL at 12:21

## 2023-01-01 RX ADMIN — DULOXETINE 60 MG: 60 CAPSULE, DELAYED RELEASE ORAL at 09:16

## 2023-01-01 RX ADMIN — GABAPENTIN 300 MG: 300 CAPSULE ORAL at 21:28

## 2023-01-01 RX ADMIN — Medication 5 MG: at 21:56

## 2023-01-01 RX ADMIN — ASPIRIN 81 MG: 81 TABLET, COATED ORAL at 10:03

## 2023-01-01 RX ADMIN — CARBIDOPA AND LEVODOPA 1 HALF-TAB: 25; 100 TABLET ORAL at 12:11

## 2023-01-01 RX ADMIN — Medication 5 MG: at 05:05

## 2023-01-01 RX ADMIN — ANORECTAL OINTMENT: 15.7; .44; 24; 20.6 OINTMENT TOPICAL at 22:21

## 2023-01-01 RX ADMIN — DEXAMETHASONE SODIUM PHOSPHATE 4 MG: 4 INJECTION, SOLUTION INTRA-ARTICULAR; INTRALESIONAL; INTRAMUSCULAR; INTRAVENOUS; SOFT TISSUE at 20:13

## 2023-01-01 RX ADMIN — Medication 50 MCG: at 08:35

## 2023-01-01 RX ADMIN — CARBIDOPA AND LEVODOPA 1 HALF-TAB: 25; 100 TABLET ORAL at 12:40

## 2023-01-01 RX ADMIN — LIDOCAINE 2 PATCH: 246 PATCH TOPICAL at 12:05

## 2023-01-01 RX ADMIN — DULOXETINE 60 MG: 60 CAPSULE, DELAYED RELEASE ORAL at 09:08

## 2023-01-01 RX ADMIN — ACETAMINOPHEN 975 MG: 325 TABLET ORAL at 00:25

## 2023-01-01 RX ADMIN — GABAPENTIN 100 MG: 100 CAPSULE ORAL at 13:42

## 2023-01-01 RX ADMIN — LEVOTHYROXINE SODIUM 100 MCG: 0.05 TABLET ORAL at 08:43

## 2023-01-01 RX ADMIN — DULOXETINE 60 MG: 60 CAPSULE, DELAYED RELEASE ORAL at 08:43

## 2023-01-01 RX ADMIN — ACETAMINOPHEN 650 MG: 325 TABLET ORAL at 00:19

## 2023-01-01 RX ADMIN — DULOXETINE 60 MG: 60 CAPSULE, DELAYED RELEASE ORAL at 08:02

## 2023-01-01 RX ADMIN — OXYCODONE HYDROCHLORIDE 5 MG: 100 SOLUTION ORAL at 17:00

## 2023-01-01 RX ADMIN — OXYCODONE HYDROCHLORIDE 10 MG: 100 SOLUTION ORAL at 09:38

## 2023-01-01 RX ADMIN — GABAPENTIN 100 MG: 100 CAPSULE ORAL at 14:24

## 2023-01-01 RX ADMIN — ACETAMINOPHEN 975 MG: 325 TABLET ORAL at 16:45

## 2023-01-01 RX ADMIN — OXYCODONE HYDROCHLORIDE 5 MG: 100 SOLUTION ORAL at 22:57

## 2023-01-01 RX ADMIN — SENNOSIDES AND DOCUSATE SODIUM 1 TABLET: 50; 8.6 TABLET ORAL at 20:19

## 2023-01-01 RX ADMIN — FENTANYL CITRATE 25 MCG: 50 INJECTION, SOLUTION INTRAMUSCULAR; INTRAVENOUS at 23:56

## 2023-01-01 RX ADMIN — OLANZAPINE 5 MG: 5 TABLET, ORALLY DISINTEGRATING ORAL at 20:17

## 2023-01-01 RX ADMIN — OLANZAPINE 5 MG: 5 TABLET, ORALLY DISINTEGRATING ORAL at 13:48

## 2023-01-01 RX ADMIN — CARBIDOPA AND LEVODOPA 1 HALF-TAB: 25; 100 TABLET ORAL at 09:15

## 2023-01-01 RX ADMIN — HYDRALAZINE HYDROCHLORIDE 10 MG: 10 TABLET ORAL at 20:01

## 2023-01-01 RX ADMIN — GABAPENTIN 100 MG: 100 CAPSULE ORAL at 13:32

## 2023-01-01 RX ADMIN — OXYCODONE HYDROCHLORIDE 5 MG: 100 SOLUTION ORAL at 16:00

## 2023-01-01 RX ADMIN — OXYCODONE HYDROCHLORIDE 5 MG: 100 SOLUTION ORAL at 13:38

## 2023-01-01 RX ADMIN — DULOXETINE 60 MG: 60 CAPSULE, DELAYED RELEASE ORAL at 07:48

## 2023-01-01 RX ADMIN — GABAPENTIN 100 MG: 100 CAPSULE ORAL at 09:33

## 2023-01-01 RX ADMIN — LEVOTHYROXINE SODIUM 100 MCG: 0.05 TABLET ORAL at 09:22

## 2023-01-01 RX ADMIN — Medication 5 MG: at 10:15

## 2023-01-01 RX ADMIN — SENNOSIDES AND DOCUSATE SODIUM 1 TABLET: 8.6; 5 TABLET ORAL at 09:01

## 2023-01-01 RX ADMIN — DULOXETINE 60 MG: 60 CAPSULE, DELAYED RELEASE ORAL at 09:01

## 2023-01-01 RX ADMIN — OXYCODONE HYDROCHLORIDE 5 MG: 100 SOLUTION ORAL at 02:32

## 2023-01-01 RX ADMIN — ROCURONIUM BROMIDE 20 MG: 10 INJECTION, SOLUTION INTRAVENOUS at 21:15

## 2023-01-01 RX ADMIN — HYDRALAZINE HYDROCHLORIDE 10 MG: 10 TABLET ORAL at 07:49

## 2023-01-01 RX ADMIN — OXYCODONE HYDROCHLORIDE 5 MG: 100 SOLUTION ORAL at 09:25

## 2023-01-01 RX ADMIN — QUETIAPINE FUMARATE 12.5 MG: 25 TABLET ORAL at 16:44

## 2023-01-01 RX ADMIN — OLANZAPINE 5 MG: 5 TABLET, ORALLY DISINTEGRATING ORAL at 11:38

## 2023-01-01 RX ADMIN — OXYCODONE HYDROCHLORIDE 5 MG: 5 TABLET ORAL at 13:57

## 2023-01-01 RX ADMIN — Medication 5 MG: at 16:24

## 2023-01-01 RX ADMIN — Medication 5 MG: at 22:50

## 2023-01-01 RX ADMIN — POLYETHYLENE GLYCOL 3350 17 G: 17 POWDER, FOR SOLUTION ORAL at 09:59

## 2023-01-01 RX ADMIN — GABAPENTIN 100 MG: 100 CAPSULE ORAL at 20:31

## 2023-01-01 RX ADMIN — OXYCODONE HYDROCHLORIDE 5 MG: 100 SOLUTION ORAL at 12:04

## 2023-01-01 RX ADMIN — OLANZAPINE 5 MG: 5 TABLET, ORALLY DISINTEGRATING ORAL at 09:08

## 2023-01-01 RX ADMIN — CEFAZOLIN 1 G: 1 INJECTION, POWDER, FOR SOLUTION INTRAMUSCULAR; INTRAVENOUS at 02:37

## 2023-01-01 RX ADMIN — CARBIDOPA AND LEVODOPA 1 HALF-TAB: 25; 100 TABLET ORAL at 12:36

## 2023-01-01 RX ADMIN — OXYCODONE HYDROCHLORIDE 5 MG: 100 SOLUTION ORAL at 13:04

## 2023-01-01 RX ADMIN — HYDROMORPHONE HYDROCHLORIDE 0.25 MG: 1 INJECTION, SOLUTION INTRAMUSCULAR; INTRAVENOUS; SUBCUTANEOUS at 12:40

## 2023-01-01 RX ADMIN — DULOXETINE 60 MG: 60 CAPSULE, DELAYED RELEASE ORAL at 08:31

## 2023-01-01 RX ADMIN — GABAPENTIN 100 MG: 100 CAPSULE ORAL at 11:41

## 2023-01-01 RX ADMIN — Medication 5 MG: at 21:35

## 2023-01-01 RX ADMIN — OXYCODONE HYDROCHLORIDE 5 MG: 5 TABLET ORAL at 00:03

## 2023-01-01 RX ADMIN — CARBIDOPA AND LEVODOPA 1 HALF-TAB: 25; 100 TABLET ORAL at 20:21

## 2023-01-01 RX ADMIN — HALOPERIDOL 2 MG: 2 SOLUTION ORAL at 02:46

## 2023-01-01 RX ADMIN — SENNOSIDES AND DOCUSATE SODIUM 1 TABLET: 8.6; 5 TABLET ORAL at 09:38

## 2023-01-01 RX ADMIN — GABAPENTIN 100 MG: 100 CAPSULE ORAL at 19:59

## 2023-01-01 RX ADMIN — LEVOTHYROXINE SODIUM 100 MCG: 0.05 TABLET ORAL at 08:31

## 2023-01-01 RX ADMIN — SENNOSIDES AND DOCUSATE SODIUM 1 TABLET: 8.6; 5 TABLET ORAL at 08:34

## 2023-01-01 RX ADMIN — CARBIDOPA AND LEVODOPA 1 HALF-TAB: 25; 100 TABLET ORAL at 09:04

## 2023-01-01 RX ADMIN — ASPIRIN 81 MG: 81 TABLET, COATED ORAL at 20:01

## 2023-01-01 RX ADMIN — Medication 5 MG: at 04:10

## 2023-01-01 RX ADMIN — CARBIDOPA AND LEVODOPA 1 HALF-TAB: 25; 100 TABLET ORAL at 12:50

## 2023-01-01 RX ADMIN — SODIUM CHLORIDE, POTASSIUM CHLORIDE, SODIUM LACTATE AND CALCIUM CHLORIDE: 600; 310; 30; 20 INJECTION, SOLUTION INTRAVENOUS at 19:00

## 2023-01-01 RX ADMIN — LIDOCAINE 2 PATCH: 246 PATCH TOPICAL at 12:11

## 2023-01-01 RX ADMIN — FENTANYL CITRATE 25 MCG: 50 INJECTION, SOLUTION INTRAMUSCULAR; INTRAVENOUS at 23:44

## 2023-01-01 RX ADMIN — QUETIAPINE FUMARATE 25 MG: 25 TABLET ORAL at 21:00

## 2023-01-01 RX ADMIN — OXYCODONE HYDROCHLORIDE 5 MG: 100 SOLUTION ORAL at 20:01

## 2023-01-01 RX ADMIN — SENNOSIDES AND DOCUSATE SODIUM 1 TABLET: 8.6; 5 TABLET ORAL at 08:02

## 2023-01-01 RX ADMIN — CARBIDOPA AND LEVODOPA 1 TABLET: 25; 100 TABLET ORAL at 14:30

## 2023-01-01 RX ADMIN — PHENYLEPHRINE HYDROCHLORIDE 100 MCG: 10 INJECTION INTRAVENOUS at 19:45

## 2023-01-01 RX ADMIN — CARBIDOPA AND LEVODOPA 1 HALF-TAB: 25; 100 TABLET ORAL at 20:54

## 2023-01-01 RX ADMIN — HYDRALAZINE HYDROCHLORIDE 10 MG: 10 TABLET ORAL at 20:57

## 2023-01-01 RX ADMIN — GABAPENTIN 100 MG: 100 CAPSULE ORAL at 06:36

## 2023-01-01 RX ADMIN — SENNOSIDES AND DOCUSATE SODIUM 1 TABLET: 8.6; 5 TABLET ORAL at 09:16

## 2023-01-01 RX ADMIN — Medication 5 MG: at 21:19

## 2023-01-01 RX ADMIN — SENNOSIDES AND DOCUSATE SODIUM 1 TABLET: 8.6; 5 TABLET ORAL at 20:08

## 2023-01-01 RX ADMIN — OXYCODONE HYDROCHLORIDE 5 MG: 5 TABLET ORAL at 13:44

## 2023-01-01 RX ADMIN — QUETIAPINE FUMARATE 25 MG: 25 TABLET ORAL at 20:01

## 2023-01-01 RX ADMIN — CARBIDOPA AND LEVODOPA 1 TABLET: 25; 100 TABLET ORAL at 08:32

## 2023-01-01 RX ADMIN — QUETIAPINE FUMARATE 25 MG: 25 TABLET ORAL at 21:50

## 2023-01-01 RX ADMIN — CARBIDOPA AND LEVODOPA 1 HALF-TAB: 25; 100 TABLET ORAL at 13:40

## 2023-01-01 RX ADMIN — DULOXETINE 60 MG: 60 CAPSULE, DELAYED RELEASE ORAL at 11:36

## 2023-01-01 RX ADMIN — LIDOCAINE 2 PATCH: 246 PATCH TOPICAL at 13:12

## 2023-01-01 RX ADMIN — GABAPENTIN 100 MG: 100 CAPSULE ORAL at 13:56

## 2023-01-01 RX ADMIN — ASPIRIN 81 MG: 81 TABLET, COATED ORAL at 11:35

## 2023-01-01 RX ADMIN — CARBIDOPA AND LEVODOPA 1 TABLET: 25; 100 TABLET ORAL at 20:02

## 2023-01-01 RX ADMIN — OXYCODONE HYDROCHLORIDE 5 MG: 100 SOLUTION ORAL at 13:58

## 2023-01-01 RX ADMIN — IOPAMIDOL 90 ML: 755 INJECTION, SOLUTION INTRAVENOUS at 11:05

## 2023-01-01 RX ADMIN — CARBIDOPA AND LEVODOPA 1 TABLET: 25; 100 TABLET ORAL at 13:38

## 2023-01-01 RX ADMIN — HYDRALAZINE HYDROCHLORIDE 10 MG: 10 TABLET ORAL at 08:32

## 2023-01-01 RX ADMIN — HYDRALAZINE HYDROCHLORIDE 10 MG: 10 TABLET ORAL at 14:09

## 2023-01-01 RX ADMIN — SENNOSIDES AND DOCUSATE SODIUM 1 TABLET: 8.6; 5 TABLET ORAL at 10:05

## 2023-01-01 RX ADMIN — OXYCODONE HYDROCHLORIDE 5 MG: 5 TABLET ORAL at 19:43

## 2023-01-01 RX ADMIN — OXYCODONE HYDROCHLORIDE 5 MG: 100 SOLUTION ORAL at 09:04

## 2023-01-01 RX ADMIN — OXYCODONE HYDROCHLORIDE 5 MG: 100 SOLUTION ORAL at 20:06

## 2023-01-01 RX ADMIN — OXYCODONE HYDROCHLORIDE 5 MG: 5 TABLET ORAL at 09:22

## 2023-01-01 RX ADMIN — QUETIAPINE FUMARATE 12.5 MG: 25 TABLET ORAL at 17:14

## 2023-01-01 RX ADMIN — HYDRALAZINE HYDROCHLORIDE 10 MG: 10 TABLET ORAL at 17:38

## 2023-01-01 RX ADMIN — CARBIDOPA AND LEVODOPA 1 HALF-TAB: 25; 100 TABLET ORAL at 08:33

## 2023-01-01 RX ADMIN — BUPIVACAINE HYDROCHLORIDE 20 ML: 2.5 INJECTION, SOLUTION EPIDURAL; INFILTRATION; INTRACAUDAL at 19:08

## 2023-01-01 RX ADMIN — GABAPENTIN 100 MG: 100 CAPSULE ORAL at 13:21

## 2023-01-01 RX ADMIN — GABAPENTIN 100 MG: 100 CAPSULE ORAL at 10:17

## 2023-01-01 RX ADMIN — SENNOSIDES AND DOCUSATE SODIUM 1 TABLET: 8.6; 5 TABLET ORAL at 20:20

## 2023-01-01 RX ADMIN — OLANZAPINE 5 MG: 5 TABLET, ORALLY DISINTEGRATING ORAL at 20:31

## 2023-01-01 RX ADMIN — DULOXETINE 60 MG: 60 CAPSULE, DELAYED RELEASE ORAL at 09:59

## 2023-01-01 RX ADMIN — Medication 1 MG: at 02:29

## 2023-01-01 RX ADMIN — CARBIDOPA AND LEVODOPA 1 HALF-TAB: 25; 100 TABLET ORAL at 12:03

## 2023-01-01 RX ADMIN — OLANZAPINE 5 MG: 5 TABLET, ORALLY DISINTEGRATING ORAL at 08:02

## 2023-01-01 RX ADMIN — OXYCODONE HYDROCHLORIDE 5 MG: 5 TABLET ORAL at 14:30

## 2023-01-01 RX ADMIN — OXYCODONE HYDROCHLORIDE 10 MG: 5 TABLET ORAL at 03:07

## 2023-01-01 RX ADMIN — CARBIDOPA AND LEVODOPA 1 HALF-TAB: 25; 100 TABLET ORAL at 10:17

## 2023-01-01 RX ADMIN — Medication 5 MG: at 04:58

## 2023-01-01 RX ADMIN — CARBIDOPA AND LEVODOPA 1 HALF-TAB: 25; 100 TABLET ORAL at 22:43

## 2023-01-01 RX ADMIN — OXYCODONE HYDROCHLORIDE 5 MG: 100 SOLUTION ORAL at 10:14

## 2023-01-01 RX ADMIN — CARBIDOPA AND LEVODOPA 1 HALF-TAB: 25; 100 TABLET ORAL at 21:41

## 2023-01-01 RX ADMIN — FENTANYL CITRATE 25 MCG: 50 INJECTION INTRAMUSCULAR; INTRAVENOUS at 21:36

## 2023-01-01 RX ADMIN — GABAPENTIN 100 MG: 100 CAPSULE ORAL at 13:17

## 2023-01-01 RX ADMIN — OLANZAPINE 5 MG: 5 TABLET, ORALLY DISINTEGRATING ORAL at 20:06

## 2023-01-01 RX ADMIN — DULOXETINE 60 MG: 60 CAPSULE, DELAYED RELEASE ORAL at 07:40

## 2023-01-01 RX ADMIN — OXYCODONE HYDROCHLORIDE 10 MG: 5 TABLET ORAL at 18:11

## 2023-01-01 RX ADMIN — OLANZAPINE 5 MG: 5 TABLET, ORALLY DISINTEGRATING ORAL at 09:01

## 2023-01-01 RX ADMIN — OXYCODONE HYDROCHLORIDE 5 MG: 100 SOLUTION ORAL at 10:22

## 2023-01-01 RX ADMIN — HALOPERIDOL 2 MG: 2 SOLUTION ORAL at 11:52

## 2023-01-01 RX ADMIN — CARBIDOPA AND LEVODOPA 1 HALF-TAB: 25; 100 TABLET ORAL at 15:15

## 2023-01-01 RX ADMIN — OXYCODONE HYDROCHLORIDE 5 MG: 5 TABLET ORAL at 05:13

## 2023-01-01 RX ADMIN — CARBIDOPA AND LEVODOPA 1 HALF-TAB: 25; 100 TABLET ORAL at 12:29

## 2023-01-01 RX ADMIN — GABAPENTIN 100 MG: 100 CAPSULE ORAL at 13:44

## 2023-01-01 RX ADMIN — QUETIAPINE FUMARATE 25 MG: 25 TABLET ORAL at 21:31

## 2023-01-01 RX ADMIN — CEFAZOLIN 1 G: 1 INJECTION, POWDER, FOR SOLUTION INTRAMUSCULAR; INTRAVENOUS at 10:42

## 2023-01-01 RX ADMIN — SENNOSIDES AND DOCUSATE SODIUM 1 TABLET: 8.6; 5 TABLET ORAL at 19:58

## 2023-01-01 RX ADMIN — CARBIDOPA AND LEVODOPA 1 HALF-TAB: 25; 100 TABLET ORAL at 20:03

## 2023-01-01 RX ADMIN — SENNOSIDES AND DOCUSATE SODIUM 1 TABLET: 8.6; 5 TABLET ORAL at 09:32

## 2023-01-01 RX ADMIN — Medication 5 MG: at 21:41

## 2023-01-01 RX ADMIN — OLANZAPINE 5 MG: 5 TABLET, ORALLY DISINTEGRATING ORAL at 09:16

## 2023-01-01 RX ADMIN — Medication 5 MG: at 22:42

## 2023-01-01 RX ADMIN — Medication 5 MG: at 10:19

## 2023-01-01 RX ADMIN — GABAPENTIN 100 MG: 100 CAPSULE ORAL at 08:53

## 2023-01-01 RX ADMIN — OLANZAPINE 5 MG: 5 TABLET, ORALLY DISINTEGRATING ORAL at 09:09

## 2023-01-01 RX ADMIN — SENNOSIDES AND DOCUSATE SODIUM 1 TABLET: 8.6; 5 TABLET ORAL at 10:17

## 2023-01-01 RX ADMIN — GABAPENTIN 100 MG: 100 CAPSULE ORAL at 21:05

## 2023-01-01 RX ADMIN — LEVOTHYROXINE SODIUM 100 MCG: 0.05 TABLET ORAL at 09:04

## 2023-01-01 RX ADMIN — CARBIDOPA AND LEVODOPA 1 HALF-TAB: 25; 100 TABLET ORAL at 20:07

## 2023-01-01 RX ADMIN — OXYCODONE HYDROCHLORIDE 10 MG: 100 SOLUTION ORAL at 18:39

## 2023-01-01 RX ADMIN — OXYCODONE HYDROCHLORIDE 5 MG: 5 TABLET ORAL at 07:50

## 2023-01-01 RX ADMIN — ASPIRIN 81 MG: 81 TABLET, COATED ORAL at 19:58

## 2023-01-01 RX ADMIN — CARBIDOPA AND LEVODOPA 1 HALF-TAB: 25; 100 TABLET ORAL at 12:21

## 2023-01-01 RX ADMIN — Medication 5 MG: at 16:04

## 2023-01-01 RX ADMIN — OXYCODONE HYDROCHLORIDE 5 MG: 100 SOLUTION ORAL at 15:00

## 2023-01-01 RX ADMIN — OXYCODONE HYDROCHLORIDE 5 MG: 5 TABLET ORAL at 04:11

## 2023-01-01 RX ADMIN — OXYCODONE HYDROCHLORIDE 10 MG: 100 SOLUTION ORAL at 15:58

## 2023-01-01 RX ADMIN — SENNOSIDES AND DOCUSATE SODIUM 1 TABLET: 8.6; 5 TABLET ORAL at 20:15

## 2023-01-01 RX ADMIN — PHENYLEPHRINE HYDROCHLORIDE 0.2 MCG/KG/MIN: 10 INJECTION INTRAVENOUS at 20:41

## 2023-01-01 RX ADMIN — LEVOTHYROXINE SODIUM 100 MCG: 0.05 TABLET ORAL at 09:50

## 2023-01-01 RX ADMIN — OXYCODONE HYDROCHLORIDE 10 MG: 100 SOLUTION ORAL at 08:03

## 2023-01-01 RX ADMIN — ACETAMINOPHEN 650 MG: 325 TABLET ORAL at 20:18

## 2023-01-01 RX ADMIN — OXYCODONE HYDROCHLORIDE 5 MG: 100 SOLUTION ORAL at 22:27

## 2023-01-01 RX ADMIN — OXYCODONE HYDROCHLORIDE 5 MG: 100 SOLUTION ORAL at 12:38

## 2023-01-01 RX ADMIN — ACETAMINOPHEN 975 MG: 325 TABLET ORAL at 00:53

## 2023-01-01 RX ADMIN — POLYETHYLENE GLYCOL 3350 17 G: 17 POWDER, FOR SOLUTION ORAL at 08:31

## 2023-01-01 RX ADMIN — ANORECTAL OINTMENT: 15.7; .44; 24; 20.6 OINTMENT TOPICAL at 15:35

## 2023-01-01 RX ADMIN — LEVOTHYROXINE SODIUM 100 MCG: 0.05 TABLET ORAL at 11:13

## 2023-01-01 RX ADMIN — HYDRALAZINE HYDROCHLORIDE 10 MG: 10 TABLET ORAL at 09:38

## 2023-01-01 RX ADMIN — GABAPENTIN 100 MG: 100 CAPSULE ORAL at 08:02

## 2023-01-01 RX ADMIN — OXYCODONE HYDROCHLORIDE 5 MG: 100 SOLUTION ORAL at 09:09

## 2023-01-01 RX ADMIN — CARBIDOPA AND LEVODOPA 1 TABLET: 25; 100 TABLET ORAL at 20:19

## 2023-01-01 RX ADMIN — GABAPENTIN 100 MG: 100 CAPSULE ORAL at 12:04

## 2023-01-01 RX ADMIN — LIDOCAINE 1 PATCH: 4 PATCH TOPICAL at 11:33

## 2023-01-01 RX ADMIN — OXYCODONE HYDROCHLORIDE 10 MG: 100 SOLUTION ORAL at 13:22

## 2023-01-01 RX ADMIN — OLANZAPINE 5 MG: 5 TABLET, ORALLY DISINTEGRATING ORAL at 09:33

## 2023-01-01 RX ADMIN — POLYETHYLENE GLYCOL 3350 17 G: 17 POWDER, FOR SOLUTION ORAL at 07:39

## 2023-01-01 RX ADMIN — HYDROXYZINE HYDROCHLORIDE 10 MG: 10 TABLET ORAL at 12:47

## 2023-01-01 RX ADMIN — HYDROXYZINE HYDROCHLORIDE 10 MG: 10 TABLET ORAL at 03:07

## 2023-01-01 RX ADMIN — OXYCODONE HYDROCHLORIDE 10 MG: 100 SOLUTION ORAL at 08:32

## 2023-01-01 RX ADMIN — CARBIDOPA AND LEVODOPA 1 HALF-TAB: 25; 100 TABLET ORAL at 10:02

## 2023-01-01 RX ADMIN — SENNOSIDES AND DOCUSATE SODIUM 1 TABLET: 8.6; 5 TABLET ORAL at 09:08

## 2023-01-01 RX ADMIN — OXYCODONE HYDROCHLORIDE 5 MG: 100 SOLUTION ORAL at 20:23

## 2023-01-01 RX ADMIN — ANORECTAL OINTMENT: 15.7; .44; 24; 20.6 OINTMENT TOPICAL at 20:11

## 2023-01-01 RX ADMIN — Medication 5 MG: at 03:33

## 2023-01-01 RX ADMIN — LEVOTHYROXINE SODIUM 100 MCG: 0.05 TABLET ORAL at 07:47

## 2023-01-01 RX ADMIN — GABAPENTIN 100 MG: 100 CAPSULE ORAL at 20:20

## 2023-01-01 RX ADMIN — OXYCODONE HYDROCHLORIDE 10 MG: 100 SOLUTION ORAL at 12:59

## 2023-01-01 RX ADMIN — OXYCODONE HYDROCHLORIDE 5 MG: 100 SOLUTION ORAL at 21:04

## 2023-01-01 RX ADMIN — OLANZAPINE 5 MG: 5 TABLET, ORALLY DISINTEGRATING ORAL at 22:14

## 2023-01-01 RX ADMIN — ACETAMINOPHEN 650 MG: 325 TABLET ORAL at 16:44

## 2023-01-01 RX ADMIN — OXYCODONE HYDROCHLORIDE 10 MG: 100 SOLUTION ORAL at 15:01

## 2023-01-01 RX ADMIN — GABAPENTIN 100 MG: 100 CAPSULE ORAL at 14:34

## 2023-01-01 RX ADMIN — OXYCODONE HYDROCHLORIDE 5 MG: 100 SOLUTION ORAL at 13:12

## 2023-01-01 RX ADMIN — Medication 5 MG: at 23:00

## 2023-01-01 RX ADMIN — ANORECTAL OINTMENT: 15.7; .44; 24; 20.6 OINTMENT TOPICAL at 04:41

## 2023-01-01 RX ADMIN — HYDROMORPHONE HYDROCHLORIDE 0.5 MG: 1 INJECTION, SOLUTION INTRAMUSCULAR; INTRAVENOUS; SUBCUTANEOUS at 10:33

## 2023-01-01 RX ADMIN — OLANZAPINE 5 MG: 5 TABLET, ORALLY DISINTEGRATING ORAL at 20:18

## 2023-01-01 RX ADMIN — CARBIDOPA AND LEVODOPA 1 HALF-TAB: 25; 100 TABLET ORAL at 13:20

## 2023-01-01 RX ADMIN — Medication 5 MG: at 15:49

## 2023-01-01 RX ADMIN — DULOXETINE 60 MG: 60 CAPSULE, DELAYED RELEASE ORAL at 08:33

## 2023-01-01 RX ADMIN — QUETIAPINE FUMARATE 25 MG: 25 TABLET ORAL at 21:28

## 2023-01-01 RX ADMIN — OLANZAPINE 5 MG: 5 TABLET, ORALLY DISINTEGRATING ORAL at 20:20

## 2023-01-01 RX ADMIN — ASPIRIN 81 MG: 81 TABLET, COATED ORAL at 20:00

## 2023-01-01 RX ADMIN — GABAPENTIN 100 MG: 100 CAPSULE ORAL at 10:07

## 2023-01-01 RX ADMIN — OXYCODONE HYDROCHLORIDE 5 MG: 100 SOLUTION ORAL at 10:11

## 2023-01-01 RX ADMIN — KETAMINE HYDROCHLORIDE 10 MG: 10 INJECTION INTRAMUSCULAR; INTRAVENOUS at 20:27

## 2023-01-01 RX ADMIN — ASPIRIN 81 MG: 81 TABLET, COATED ORAL at 09:26

## 2023-01-01 RX ADMIN — OXYCODONE HYDROCHLORIDE 5 MG: 100 SOLUTION ORAL at 23:55

## 2023-01-01 RX ADMIN — OXYCODONE HYDROCHLORIDE 5 MG: 5 TABLET ORAL at 14:05

## 2023-01-01 RX ADMIN — Medication 5 MG: at 16:11

## 2023-01-01 RX ADMIN — Medication 5 MG: at 15:15

## 2023-01-01 RX ADMIN — FENTANYL CITRATE 25 MCG: 50 INJECTION INTRAMUSCULAR; INTRAVENOUS at 20:31

## 2023-01-01 RX ADMIN — OXYCODONE HYDROCHLORIDE 5 MG: 100 SOLUTION ORAL at 00:13

## 2023-01-01 RX ADMIN — GABAPENTIN 100 MG: 100 CAPSULE ORAL at 15:16

## 2023-01-01 RX ADMIN — OXYCODONE HYDROCHLORIDE 5 MG: 100 SOLUTION ORAL at 06:27

## 2023-01-01 RX ADMIN — DULOXETINE 60 MG: 60 CAPSULE, DELAYED RELEASE ORAL at 09:38

## 2023-01-01 RX ADMIN — OXYCODONE HYDROCHLORIDE 5 MG: 100 SOLUTION ORAL at 06:06

## 2023-01-01 RX ADMIN — OXYCODONE HYDROCHLORIDE 5 MG: 100 SOLUTION ORAL at 01:48

## 2023-01-01 RX ADMIN — OXYCODONE HYDROCHLORIDE 10 MG: 100 SOLUTION ORAL at 16:47

## 2023-01-01 RX ADMIN — ACETAMINOPHEN 975 MG: 325 TABLET ORAL at 07:51

## 2023-01-01 RX ADMIN — OXYCODONE HYDROCHLORIDE 5 MG: 100 SOLUTION ORAL at 23:50

## 2023-01-01 RX ADMIN — Medication 50 MCG: at 09:38

## 2023-01-01 RX ADMIN — POLYETHYLENE GLYCOL 3350 17 G: 17 POWDER, FOR SOLUTION ORAL at 11:38

## 2023-01-01 RX ADMIN — Medication 5 MG: at 22:36

## 2023-01-01 RX ADMIN — HYDRALAZINE HYDROCHLORIDE 10 MG: 10 TABLET ORAL at 13:44

## 2023-01-01 RX ADMIN — HYDRALAZINE HYDROCHLORIDE 10 MG: 10 TABLET ORAL at 14:30

## 2023-01-01 RX ADMIN — OXYCODONE HYDROCHLORIDE 5 MG: 100 SOLUTION ORAL at 16:56

## 2023-01-01 RX ADMIN — GABAPENTIN 100 MG: 100 CAPSULE ORAL at 06:48

## 2023-01-01 RX ADMIN — OXYCODONE HYDROCHLORIDE 5 MG: 100 SOLUTION ORAL at 03:22

## 2023-01-01 RX ADMIN — LEVOTHYROXINE SODIUM 100 MCG: 0.05 TABLET ORAL at 09:33

## 2023-01-01 RX ADMIN — POLYETHYLENE GLYCOL 3350 17 G: 17 POWDER, FOR SOLUTION ORAL at 09:38

## 2023-01-01 RX ADMIN — OXYCODONE HYDROCHLORIDE 5 MG: 100 SOLUTION ORAL at 18:09

## 2023-01-01 RX ADMIN — Medication 5 MG: at 03:45

## 2023-01-01 RX ADMIN — OXYCODONE HYDROCHLORIDE 10 MG: 100 SOLUTION ORAL at 15:30

## 2023-01-01 RX ADMIN — GABAPENTIN 100 MG: 100 CAPSULE ORAL at 09:08

## 2023-01-01 RX ADMIN — OXYCODONE HYDROCHLORIDE 5 MG: 100 SOLUTION ORAL at 02:04

## 2023-01-01 RX ADMIN — OXYCODONE HYDROCHLORIDE 5 MG: 100 SOLUTION ORAL at 17:59

## 2023-01-01 RX ADMIN — LIDOCAINE 2 PATCH: 246 PATCH TOPICAL at 12:40

## 2023-01-01 ASSESSMENT — ACTIVITIES OF DAILY LIVING (ADL)
ADLS_ACUITY_SCORE: 61
ADLS_ACUITY_SCORE: 57
ADLS_ACUITY_SCORE: 59
ADLS_ACUITY_SCORE: 65
ADLS_ACUITY_SCORE: 55
ADLS_ACUITY_SCORE: 53
ADLS_ACUITY_SCORE: 55
ADLS_ACUITY_SCORE: 57
ADLS_ACUITY_SCORE: 55
ADLS_ACUITY_SCORE: 61
ADLS_ACUITY_SCORE: 65
ADLS_ACUITY_SCORE: 57
ADLS_ACUITY_SCORE: 65
ADLS_ACUITY_SCORE: 65
ADLS_ACUITY_SCORE: 63
ADLS_ACUITY_SCORE: 59
ADLS_ACUITY_SCORE: 57
ADLS_ACUITY_SCORE: 57
ADLS_ACUITY_SCORE: 63
ADLS_ACUITY_SCORE: 41
ADLS_ACUITY_SCORE: 63
ADLS_ACUITY_SCORE: 65
ADLS_ACUITY_SCORE: 61
ADLS_ACUITY_SCORE: 65
ADLS_ACUITY_SCORE: 57
ADLS_ACUITY_SCORE: 59
ADLS_ACUITY_SCORE: 57
ADLS_ACUITY_SCORE: 61
ADLS_ACUITY_SCORE: 61
ADLS_ACUITY_SCORE: 65
ADLS_ACUITY_SCORE: 61
ADLS_ACUITY_SCORE: 59
ADLS_ACUITY_SCORE: 55
ADLS_ACUITY_SCORE: 41
ADLS_ACUITY_SCORE: 56
ADLS_ACUITY_SCORE: 65
ADLS_ACUITY_SCORE: 63
ADLS_ACUITY_SCORE: 58
ADLS_ACUITY_SCORE: 57
ADLS_ACUITY_SCORE: 63
ADLS_ACUITY_SCORE: 57
ADLS_ACUITY_SCORE: 57
ADLS_ACUITY_SCORE: 63
ADLS_ACUITY_SCORE: 49
ADLS_ACUITY_SCORE: 43
ADLS_ACUITY_SCORE: 55
ADLS_ACUITY_SCORE: 61
ADLS_ACUITY_SCORE: 59
ADLS_ACUITY_SCORE: 59
ADLS_ACUITY_SCORE: 65
ADLS_ACUITY_SCORE: 63
ADLS_ACUITY_SCORE: 57
ADLS_ACUITY_SCORE: 65
ADLS_ACUITY_SCORE: 63
ADLS_ACUITY_SCORE: 65
ADLS_ACUITY_SCORE: 61
ADLS_ACUITY_SCORE: 61
ADLS_ACUITY_SCORE: 65
ADLS_ACUITY_SCORE: 57
ADLS_ACUITY_SCORE: 55
ADLS_ACUITY_SCORE: 49
ADLS_ACUITY_SCORE: 65
ADLS_ACUITY_SCORE: 57
ADLS_ACUITY_SCORE: 55
ADLS_ACUITY_SCORE: 51
ADLS_ACUITY_SCORE: 51
ADLS_ACUITY_SCORE: 61
ADLS_ACUITY_SCORE: 43
ADLS_ACUITY_SCORE: 49
ADLS_ACUITY_SCORE: 61
ADLS_ACUITY_SCORE: 61
ADLS_ACUITY_SCORE: 59
ADLS_ACUITY_SCORE: 61
ADLS_ACUITY_SCORE: 57
ADLS_ACUITY_SCORE: 59
ADLS_ACUITY_SCORE: 63
ADLS_ACUITY_SCORE: 65
ADLS_ACUITY_SCORE: 55
ADLS_ACUITY_SCORE: 61
ADLS_ACUITY_SCORE: 59
ADLS_ACUITY_SCORE: 63
ADLS_ACUITY_SCORE: 57
ADLS_ACUITY_SCORE: 65
ADLS_ACUITY_SCORE: 57
ADLS_ACUITY_SCORE: 61
ADLS_ACUITY_SCORE: 56
ADLS_ACUITY_SCORE: 65
ADLS_ACUITY_SCORE: 61
ADLS_ACUITY_SCORE: 61
ADLS_ACUITY_SCORE: 55
ADLS_ACUITY_SCORE: 55
ADLS_ACUITY_SCORE: 63
ADLS_ACUITY_SCORE: 59
ADLS_ACUITY_SCORE: 61
ADLS_ACUITY_SCORE: 57
ADLS_ACUITY_SCORE: 49
ADLS_ACUITY_SCORE: 49
ADLS_ACUITY_SCORE: 61
ADLS_ACUITY_SCORE: 56
ADLS_ACUITY_SCORE: 57
ADLS_ACUITY_SCORE: 63
ADLS_ACUITY_SCORE: 49
ADLS_ACUITY_SCORE: 59
ADLS_ACUITY_SCORE: 61
ADLS_ACUITY_SCORE: 57
ADLS_ACUITY_SCORE: 53
ADLS_ACUITY_SCORE: 59
ADLS_ACUITY_SCORE: 56
ADLS_ACUITY_SCORE: 57
ADLS_ACUITY_SCORE: 65
ADLS_ACUITY_SCORE: 55
ADLS_ACUITY_SCORE: 65
ADLS_ACUITY_SCORE: 65
ADLS_ACUITY_SCORE: 57
ADLS_ACUITY_SCORE: 49
ADLS_ACUITY_SCORE: 57
ADLS_ACUITY_SCORE: 63
ADLS_ACUITY_SCORE: 57
ADLS_ACUITY_SCORE: 63
ADLS_ACUITY_SCORE: 61
ADLS_ACUITY_SCORE: 57
ADLS_ACUITY_SCORE: 65
ADLS_ACUITY_SCORE: 56
ADLS_ACUITY_SCORE: 56
ADLS_ACUITY_SCORE: 55
ADLS_ACUITY_SCORE: 57
ADLS_ACUITY_SCORE: 55
ADLS_ACUITY_SCORE: 63
ADLS_ACUITY_SCORE: 61
ADLS_ACUITY_SCORE: 57
ADLS_ACUITY_SCORE: 61
ADLS_ACUITY_SCORE: 61
ADLS_ACUITY_SCORE: 53
ADLS_ACUITY_SCORE: 61
ADLS_ACUITY_SCORE: 59
ADLS_ACUITY_SCORE: 53
ADLS_ACUITY_SCORE: 65
ADLS_ACUITY_SCORE: 60
ADLS_ACUITY_SCORE: 59
ADLS_ACUITY_SCORE: 65
ADLS_ACUITY_SCORE: 59
ADLS_ACUITY_SCORE: 59
ADLS_ACUITY_SCORE: 55
ADLS_ACUITY_SCORE: 59
ADLS_ACUITY_SCORE: 59
ADLS_ACUITY_SCORE: 55
ADLS_ACUITY_SCORE: 59
ADLS_ACUITY_SCORE: 59
ADLS_ACUITY_SCORE: 43
ADLS_ACUITY_SCORE: 37
ADLS_ACUITY_SCORE: 51
ADLS_ACUITY_SCORE: 63
ADLS_ACUITY_SCORE: 65
ADLS_ACUITY_SCORE: 55
ADLS_ACUITY_SCORE: 59
ADLS_ACUITY_SCORE: 40
ADLS_ACUITY_SCORE: 63
ADLS_ACUITY_SCORE: 61
ADLS_ACUITY_SCORE: 59
ADLS_ACUITY_SCORE: 60
ADLS_ACUITY_SCORE: 65
ADLS_ACUITY_SCORE: 57
ADLS_ACUITY_SCORE: 57
ADLS_ACUITY_SCORE: 65
ADLS_ACUITY_SCORE: 53
ADLS_ACUITY_SCORE: 65
ADLS_ACUITY_SCORE: 49
ADLS_ACUITY_SCORE: 65
ADLS_ACUITY_SCORE: 55
ADLS_ACUITY_SCORE: 61
ADLS_ACUITY_SCORE: 63
ADLS_ACUITY_SCORE: 57
ADLS_ACUITY_SCORE: 51
ADLS_ACUITY_SCORE: 59
ADLS_ACUITY_SCORE: 61
ADLS_ACUITY_SCORE: 57
ADLS_ACUITY_SCORE: 49
ADLS_ACUITY_SCORE: 61
ADLS_ACUITY_SCORE: 57
ADLS_ACUITY_SCORE: 53
ADLS_ACUITY_SCORE: 61
ADLS_ACUITY_SCORE: 57
ADLS_ACUITY_SCORE: 61
ADLS_ACUITY_SCORE: 65
ADLS_ACUITY_SCORE: 57
ADLS_ACUITY_SCORE: 65
ADLS_ACUITY_SCORE: 65
ADLS_ACUITY_SCORE: 59
ADLS_ACUITY_SCORE: 61
ADLS_ACUITY_SCORE: 65
ADLS_ACUITY_SCORE: 63
ADLS_ACUITY_SCORE: 35
ADLS_ACUITY_SCORE: 61
ADLS_ACUITY_SCORE: 41
ADLS_ACUITY_SCORE: 56
ADLS_ACUITY_SCORE: 63
DEPENDENT_IADLS:: CLEANING;COOKING;LAUNDRY;SHOPPING;MEAL PREPARATION;MEDICATION MANAGEMENT;MONEY MANAGEMENT;TRANSPORTATION
ADLS_ACUITY_SCORE: 56
ADLS_ACUITY_SCORE: 60
ADLS_ACUITY_SCORE: 65
ADLS_ACUITY_SCORE: 59
ADLS_ACUITY_SCORE: 55
ADLS_ACUITY_SCORE: 51
ADLS_ACUITY_SCORE: 61
ADLS_ACUITY_SCORE: 65
ADLS_ACUITY_SCORE: 61
ADLS_ACUITY_SCORE: 43
ADLS_ACUITY_SCORE: 57
ADLS_ACUITY_SCORE: 61
ADLS_ACUITY_SCORE: 61
ADLS_ACUITY_SCORE: 65
ADLS_ACUITY_SCORE: 53
ADLS_ACUITY_SCORE: 55
ADLS_ACUITY_SCORE: 61
ADLS_ACUITY_SCORE: 59
ADLS_ACUITY_SCORE: 63
ADLS_ACUITY_SCORE: 57
ADLS_ACUITY_SCORE: 65
ADLS_ACUITY_SCORE: 55
ADLS_ACUITY_SCORE: 65
ADLS_ACUITY_SCORE: 57
ADLS_ACUITY_SCORE: 57
ADLS_ACUITY_SCORE: 61
ADLS_ACUITY_SCORE: 59
ADLS_ACUITY_SCORE: 55
ADLS_ACUITY_SCORE: 59
ADLS_ACUITY_SCORE: 65
ADLS_ACUITY_SCORE: 61
ADLS_ACUITY_SCORE: 55
ADLS_ACUITY_SCORE: 63
ADLS_ACUITY_SCORE: 65
ADLS_ACUITY_SCORE: 59
ADLS_ACUITY_SCORE: 65
ADLS_ACUITY_SCORE: 63
ADLS_ACUITY_SCORE: 59
ADLS_ACUITY_SCORE: 65
ADLS_ACUITY_SCORE: 57
ADLS_ACUITY_SCORE: 61
ADLS_ACUITY_SCORE: 55
ADLS_ACUITY_SCORE: 63
ADLS_ACUITY_SCORE: 57
ADLS_ACUITY_SCORE: 61
ADLS_ACUITY_SCORE: 65
ADLS_ACUITY_SCORE: 59
ADLS_ACUITY_SCORE: 59
ADLS_ACUITY_SCORE: 65
ADLS_ACUITY_SCORE: 49
ADLS_ACUITY_SCORE: 56
ADLS_ACUITY_SCORE: 61
ADLS_ACUITY_SCORE: 35
ADLS_ACUITY_SCORE: 56
EQUIPMENT_CURRENTLY_USED_AT_HOME: WALKER, ROLLING
ADLS_ACUITY_SCORE: 35
ADLS_ACUITY_SCORE: 63
ADLS_ACUITY_SCORE: 57
ADLS_ACUITY_SCORE: 55
ADLS_ACUITY_SCORE: 53
ADLS_ACUITY_SCORE: 56
ADLS_ACUITY_SCORE: 59
ADLS_ACUITY_SCORE: 53
ADLS_ACUITY_SCORE: 57
ADLS_ACUITY_SCORE: 65
ADLS_ACUITY_SCORE: 57
ADLS_ACUITY_SCORE: 43
ADLS_ACUITY_SCORE: 61
ADLS_ACUITY_SCORE: 55
ADLS_ACUITY_SCORE: 51
ADLS_ACUITY_SCORE: 40
ADLS_ACUITY_SCORE: 63
ADLS_ACUITY_SCORE: 53
ADLS_ACUITY_SCORE: 59
ADLS_ACUITY_SCORE: 63
ADLS_ACUITY_SCORE: 61
ADLS_ACUITY_SCORE: 49
ADLS_ACUITY_SCORE: 41
ADLS_ACUITY_SCORE: 57
ADLS_ACUITY_SCORE: 56
ADLS_ACUITY_SCORE: 63
ADLS_ACUITY_SCORE: 59
ADLS_ACUITY_SCORE: 59
ADLS_ACUITY_SCORE: 56
ADLS_ACUITY_SCORE: 65
ADLS_ACUITY_SCORE: 57
ADLS_ACUITY_SCORE: 65
ADLS_ACUITY_SCORE: 51
ADLS_ACUITY_SCORE: 59
ADLS_ACUITY_SCORE: 65
ADLS_ACUITY_SCORE: 61
ADLS_ACUITY_SCORE: 57
ADLS_ACUITY_SCORE: 55
ADLS_ACUITY_SCORE: 59
ADLS_ACUITY_SCORE: 59
ADLS_ACUITY_SCORE: 55
ADLS_ACUITY_SCORE: 63
ADLS_ACUITY_SCORE: 61
ADLS_ACUITY_SCORE: 49
ADLS_ACUITY_SCORE: 53
ADLS_ACUITY_SCORE: 49
ADLS_ACUITY_SCORE: 63
ADLS_ACUITY_SCORE: 65
ADLS_ACUITY_SCORE: 65
ADLS_ACUITY_SCORE: 61
ADLS_ACUITY_SCORE: 65
ADLS_ACUITY_SCORE: 56
ADLS_ACUITY_SCORE: 53
ADLS_ACUITY_SCORE: 65
ADLS_ACUITY_SCORE: 55
ADLS_ACUITY_SCORE: 53
ADLS_ACUITY_SCORE: 63
ADLS_ACUITY_SCORE: 55
ADLS_ACUITY_SCORE: 60
ADLS_ACUITY_SCORE: 65
ADLS_ACUITY_SCORE: 60
ADLS_ACUITY_SCORE: 55
ADLS_ACUITY_SCORE: 61
ADLS_ACUITY_SCORE: 65
ADLS_ACUITY_SCORE: 59
ADLS_ACUITY_SCORE: 37
ADLS_ACUITY_SCORE: 61
ADLS_ACUITY_SCORE: 59
ADLS_ACUITY_SCORE: 55
ADLS_ACUITY_SCORE: 57
ADLS_ACUITY_SCORE: 65
ADLS_ACUITY_SCORE: 55
ADLS_ACUITY_SCORE: 57
ADLS_ACUITY_SCORE: 53
ADLS_ACUITY_SCORE: 57
ADLS_ACUITY_SCORE: 43
ADLS_ACUITY_SCORE: 57
ADLS_ACUITY_SCORE: 59
ADLS_ACUITY_SCORE: 65
ADLS_ACUITY_SCORE: 65
ADLS_ACUITY_SCORE: 61
ADLS_ACUITY_SCORE: 57
ADLS_ACUITY_SCORE: 43
ADLS_ACUITY_SCORE: 54
ADLS_ACUITY_SCORE: 61
ADLS_ACUITY_SCORE: 35
ADLS_ACUITY_SCORE: 65
ADLS_ACUITY_SCORE: 65
ADLS_ACUITY_SCORE: 61
ADLS_ACUITY_SCORE: 65
ADLS_ACUITY_SCORE: 56
ADLS_ACUITY_SCORE: 61
ADLS_ACUITY_SCORE: 65
ADLS_ACUITY_SCORE: 59
ADLS_ACUITY_SCORE: 65
ADLS_ACUITY_SCORE: 65
ADLS_ACUITY_SCORE: 57
ADLS_ACUITY_SCORE: 55
ADLS_ACUITY_SCORE: 63
ADLS_ACUITY_SCORE: 63
ADLS_ACUITY_SCORE: 65
ADLS_ACUITY_SCORE: 57
ADLS_ACUITY_SCORE: 55
ADLS_ACUITY_SCORE: 61
ADLS_ACUITY_SCORE: 51
ADLS_ACUITY_SCORE: 61
ADLS_ACUITY_SCORE: 40
ADLS_ACUITY_SCORE: 59
ADLS_ACUITY_SCORE: 57
ADLS_ACUITY_SCORE: 65
ADLS_ACUITY_SCORE: 61
ADLS_ACUITY_SCORE: 55
ADLS_ACUITY_SCORE: 51
ADLS_ACUITY_SCORE: 61
ADLS_ACUITY_SCORE: 61
ADLS_ACUITY_SCORE: 65
ADLS_ACUITY_SCORE: 58
ADLS_ACUITY_SCORE: 59
ADLS_ACUITY_SCORE: 59
ADLS_ACUITY_SCORE: 63
ADLS_ACUITY_SCORE: 61
ADLS_ACUITY_SCORE: 51
ADLS_ACUITY_SCORE: 51
ADLS_ACUITY_SCORE: 61
ADLS_ACUITY_SCORE: 59
ADLS_ACUITY_SCORE: 51
ADLS_ACUITY_SCORE: 63
ADLS_ACUITY_SCORE: 57
ADLS_ACUITY_SCORE: 57
ADLS_ACUITY_SCORE: 56
ADLS_ACUITY_SCORE: 65
ADLS_ACUITY_SCORE: 65
ADLS_ACUITY_SCORE: 63
ADLS_ACUITY_SCORE: 59
ADLS_ACUITY_SCORE: 63
ADLS_ACUITY_SCORE: 65
ADLS_ACUITY_SCORE: 57
ADLS_ACUITY_SCORE: 59
ADLS_ACUITY_SCORE: 59
ADLS_ACUITY_SCORE: 61
ADLS_ACUITY_SCORE: 63
ADLS_ACUITY_SCORE: 53
ADLS_ACUITY_SCORE: 63
ADLS_ACUITY_SCORE: 63
ADLS_ACUITY_SCORE: 56
ADLS_ACUITY_SCORE: 55
ADLS_ACUITY_SCORE: 59
ADLS_ACUITY_SCORE: 65
ADLS_ACUITY_SCORE: 57
ADLS_ACUITY_SCORE: 57
ADLS_ACUITY_SCORE: 61
ADLS_ACUITY_SCORE: 55

## 2023-01-01 ASSESSMENT — PAIN SCALES - GENERAL: PAINLEVEL: EXTREME PAIN (8)

## 2023-03-23 NOTE — PATIENT INSTRUCTIONS
Plan:   - Increase carbidopa/levodopa, 25/100, to 1 tab at 7 am and 8 pm and 1.5 tabs at 1 pm.  - Palliative care referral     Patient to return in 7 months, for in-person visit, 30 minutes.
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
## 2023-04-25 NOTE — CONFIDENTIAL NOTE
Return to carbidopa/levodopa IR 25/100 mg 1 tab three times daily. From last clinic visit, increase carbidopa/levodopa IR to 1.5 tabs in the afternoon and patient is reported to have more falls, increased tiredness and confusion.     Chasidy Lugo DO   of Neurology   [facility]

## 2023-06-20 NOTE — PROGRESS NOTES
"Oncology Rooming Note    June 20, 2023 3:58 PM   Mehreen Camara is a 83 year old female who presents for:    Chief Complaint   Patient presents with     Oncology Clinic Visit     Palliative Care consult     Initial Vitals: BP (!) 145/78   Pulse 77   Resp 16   Wt 62.6 kg (138 lb)   SpO2 95%   BMI 22.27 kg/m   Estimated body mass index is 22.27 kg/m  as calculated from the following:    Height as of 11/8/22: 1.676 m (5' 6\").    Weight as of this encounter: 62.6 kg (138 lb). Body surface area is 1.71 meters squared.  Data Unavailable Comment: Data Unavailable   No LMP recorded. Patient is postmenopausal.  Allergies reviewed: Yes  Medications reviewed: Yes    Medications: Medication refills not needed today.  Pharmacy name entered into incuBET:    [facility] [place] - [address] [facility] [id number] - [facility] [address]    Clinical concerns: Falls, pt and daughter voice biggest concerns about the care she receives at her living facility.  Dr. Pace was notified.      Jana Hart RN              "

## 2023-06-20 NOTE — PATIENT INSTRUCTIONS
It was good to see you today,Mehreen and Eladio    Here are the things we talked about:  I think you should spend some time trying to decide if moving to a new facility might be worth the burden of moving in to a new place.  In other words, is the benefit worth the burden of moving.    If you stay put at [facility] then you should spend some more time considering if hospice might be a good care approach for you--more of a quality versus quantity approach.    Someone from the team will reach out to schedule a follow up appointment in 2-3 months..    How to get a hold of us:  For non-urgent matters, MyChart works best.    For more urgent matters, or if you prefer not to use MyChart, call our clinic nurse coordinator Becky Castillo RN at [phone]    We have an on-call number for evenings and weekends. Please call this only if you are having uncontrolled symptoms or serious side effects from your medicines: [phone].     For refills, please give us a week (5 working days) notice. We don't always have providers available everyday to do refills. If you call the day you run out of your medicine, we may not be able to refill it in time, so call 5 days in advance!    Luther Pace MD MS FAAFP CAQHPM  [facility]  Office [phone]  Fax [phone]

## 2023-06-20 NOTE — PROGRESS NOTES
Palliative Care Outpatient Clinic Consultation Note    Patient:  Mehreen Camara    Chief Complaint:   Mehreen Camara 83 year old female who is presenting to the palliative medicine clinic today at the request of  for a palliative care consultation secondary to MSA and early Lewy body Dementia.   The patient's primary care provider is:  Martinez Figueroa.     History of Present Illness:  83 year old with a 9 year history of MSA.  She has some early Lewy Body dementia symptoms.  She has frequent falls-the last one last weekend and she thinks she has had 7 falls in , so far.  Mehreen has significant autonomic instability due to her MSA with a recorded >100 point BP drop on orthostatic testing.    Distressing Symptom/s:  MSK pain in back related to compression fractures, she is on scheduled oxycodone managed by the NP at [facility]; she apparently had a very good response to 'trial' ablation of some nerve roots and is now awaiting insurance [facility] authorization for a permanent ablation.    Patient's Disease Understanding:  limited due to her LB dementia    Coping:  OK when she is getting her medications and with family;     Social History  Born: [place]  Education: attended [employer] in [place] but left to work after 10th grade;  Living Situation: Memory Care at [facility]--daughter has concerns about the quality of care she is receiving and has some worries about medication diversion.  Relationships: to Tiff for >50 years; he passed away 2014; she had been  as a young woman and  when she had two children; she had three children with Tiff.  Children: 5 children in [facility]; all in the [facility]; she has ~11 grand children and 6 GGK's;   Actual/Potential Caregiver(s): team at [facility] and her daughter Eladio.  Support System: immediate family ('everyone else has ')  Occupation: cook at [employer]; soldering under  a microscope for [employer]  Hobbies: gardening and hand made quiilting  Patient is 'famous for winning a bicycle through a drawing contest in the [employer] when she was 9 years old'  Substance Use/History of misuse: none  Financial Concerns: none with current set up  Spiritual Background: [place]  Spiritual Concerns/Needs: watches Mass on TV once in a awhile;     Social History     Tobacco Use     Smoking status: Former     Packs/day: 0.50     Types: Cigarettes     Quit date: 1994     Years since quittin.5     Smokeless tobacco: Never   Substance Use Topics     Alcohol use: No     Drug use: No       Family History  Family History   Problem Relation Age of Onset     Hypertension Mother      Coronary Artery Disease Mother      Coronary Artery Disease Father      Other Cancer Brother      Parkinsonism Other      Heart Disease Mother      Heart Disease Father      Cancer Brother 57.00        colon     Hypertension Daughter      Hypertension Daughter      Neuropathy Daughter      Lupus Daughter      Heart Disease Paternal Aunt      Heart Disease Paternal Uncle      Parkinsonism Paternal Uncle      Patient's Involvement with Prior History of Serious Illness in Family:   at home from lung cancer    Advance Care Planning:  Advance Directive:   ACP and POLST in chart  Where is written copy located: in [employer]  Health Care Agent Contact Information: daughter Eladio, who was present today  POLST:   Present in Epic and it needs to be updated reflecting her preference to not have any artificial nutrition  CODE STATUS: DNAR/DNI    Allergies   Allergen Reactions     Penicillins Anaphylaxis, Rash and Shortness Of Breath     Aspirin Nausea and GI Disturbance     Atenolol Cough     Atorvastatin Muscle Pain (Myalgia) and Nausea and Vomiting     Bupropion Nausea     Cephalexin Rash     Localized to neck area     Clindamycin Other (See Comments)     Constipation      Codeine Nausea     Ibuprofen Nausea  and GI Disturbance     Lovastatin Muscle Pain (Myalgia)     Current Outpatient Medications   Medication Sig Dispense Refill     acetaminophen (TYLENOL) 500 MG tablet Take 2 tablets (1,000 mg) by mouth 3 times daily       bisacodyl (DULCOLAX) 10 MG suppository Place 10 mg rectally daily as needed for constipation       carbidopa-levodopa (SINEMET)  MG tablet Take carbidopa/levodopa IR 25/100 mg 1 tab at 7 am, 1 pm and 8 pm = 3 tabs daily 270 tablet 3     diclofenac (VOLTAREN) 1 % topical gel Apply 2 g topically 3 times daily as needed for moderate pain       DULoxetine (CYMBALTA) 60 MG capsule Take 60 mg by mouth daily       gabapentin (NEURONTIN) 300 MG capsule Take 1 capsule (300 mg) by mouth At Bedtime 30 capsule 0     gabapentin (NEURONTIN) 300 MG capsule Take 300 mg by mouth 2 times daily AM and 1345       hydrALAZINE (APRESOLINE) 10 MG tablet Take 10 mg by mouth 3 times daily       lactobacillus rhamnosus (GG) (CULTURELL) capsule Take 1 capsule by mouth daily       levothyroxine (SYNTHROID/LEVOTHROID) 100 MCG tablet Take 1 tablet (100 mcg) by mouth daily       lidocaine (LIDODERM) 5 % patch Place 1 patch onto the skin every 24 hours To prevent lidocaine toxicity, patient should be patch free for 12 hrs daily.       losartan (COZAAR) 50 MG tablet Take 50 mg by mouth 2 times daily       naloxone (NARCAN) 4 MG/0.1ML nasal spray Spray 4 mg into one nostril alternating nostrils once as needed for opioid reversal every 2-3 minutes until assistance arrives       nystatin (MYCOSTATIN) 573879 UNIT/GM external cream Apply topically 2 times daily APPLY TOPICALLY TO AREA UNDER  right BREAST       oxyCODONE (ROXICODONE) 5 MG tablet Take 1 tablet (5 mg) by mouth 3 times daily 15 tablet 0     oxyCODONE (ROXICODONE) 5 MG tablet Take 2.5 mg by mouth every 6 hours as needed for severe pain       polyethylene glycol (MIRALAX) 17 GM/Dose powder Take 17 g by mouth 2 times daily 1020 g 3     polyethylene glycol-propylene  glycol (SYSTANE ULTRA) 0.4-0.3 % SOLN ophthalmic solution Place 2 drops into both eyes every hour as needed for dry eyes       polyethylene glycol-propylene glycol (SYSTANE ULTRA) 0.4-0.3 % SOLN ophthalmic solution Place 2 drops into both eyes 3 times daily (and additionally as needed/requested)       QUEtiapine (SEROQUEL) 25 MG tablet Take 1 tablet (25 mg) by mouth At Bedtime       senna-docusate (SENOKOT-S/PERICOLACE) 8.6-50 MG tablet Take 1 tablet by mouth daily       senna-docusate (SENOKOT-S/PERICOLACE) 8.6-50 MG tablet Take 1 tablet by mouth 2 times daily as needed for constipation       simethicone (MYLICON) 125 MG chewable tablet Take 125 mg by mouth 2 times daily (Patient not taking: Reported on 3/23/2023)       Simethicone 125 MG TABS Take 1 chew tab by mouth 2 times daily as needed bloating (Patient not taking: Reported on 3/23/2023)       sodium phosphate (FLEET ENEMA) 7-19 GM/118ML rectal enema Place 1 enema rectally once as needed for constipation       vitamin D3 (CHOLECALCIFEROL) 50 mcg (2000 units) tablet Take 1 tablet (50 mcg) by mouth daily       Past Medical History:   Diagnosis Date     AAA (abdominal aortic aneurysm)      Abdominal pain, epigastric 12/16/2013     Abnormal computed tomography scan 9/15/2016     Abnormal finding on imaging 9/15/2016     Acute encephalopathy 9/26/2015     Adjustment disorder with anxious mood 9/17/2018     Adjustment disorder with mixed anxiety and depressed mood 9/17/2018     Adrenal adenoma     stable, thought not to be hormonally active     Adrenal adenoma      Agitation      Anemia 11/18/2010     Aneurysm of abdominal vessel 2/25/2008    Created by [facility] Annotation: Jun 8 2011  8:07AM - Danni Ortiz: 4.4 on 11/2013.   Needs 6-12 month u/s or CT.  Replacement Utility updated for latest IMO load  Overview:  2/08 Abd US: 5.4 cm. 3/08 CT Abd: 2.7 x 3.3. 9/08:  MRI Abd: 2.7X3.2  Next 9/09.     Anxiety state 2/26/2008     Arthritis       Asymptomatic postmenopausal status     Created by Conversion  Replacement Utility updated for latest IMO load     Back pain 5/16/2020     Harris esophagus      Harris's esophagus 1/11/2012    Created by [facility] Annotation: Feb  3 2012  8:Cameron Elam: Needs EGD 2/2017   Overview:  Overview:  Created by [facility] Annotation: Feb  3 2012  8:32Cameron Tejada: Needs EGD 2/2017     Benign neoplasm of ascending colon 9/19/2016     Bilateral knee pain 8/5/2020     Bloating 5/5/2017     Bradycardia 9/17/2018     Cataract 9/17/2018    Created by Conversion   Overview:  Overview:  Created by Conversion     Chest wall pain 1/20/2013     Chronic low back pain 9/29/2016     Cognitive and behavioral changes      Compression fracture of lumbar vertebra (H) 10/10/2020     Constipation      Contact dermatitis and eczema 11/22/2006     DDD (degenerative disc disease), lumbosacral 2/26/2008     Depression      Depression, major 7/13/2009     Depressive disorder      Diaphragmatic hernia 9/18/2018    Created by Conversion  Replacement Utility updated for latest IMO load  Overview:  Overview:  Created by Conversion  Replacement Utility updated for latest IMO load     Dietary counseling and surveillance 9/15/2016     Disorder of bone and cartilage 9/17/2018    Created by [facility] Annotation: Dec 13 2012  7:41Roberta Kelly: vit D/Ca, f/u  Dexa needed 1/2014.  Replacement Utility updated for latest IMO load  Overview:  Overview:  Created by [facility] Annotation: Dec 13 2012  7:41Roberta Kelly: vit D/Ca, f/u  Dexa needed 1/2014.  Replacement Utility updated for latest IMO load     Diverticulosis of colon 12/18/2014     Dizziness and giddiness     Created by Conversion      Dysphagia 12/17/2013     Early satiety 12/16/2013     Esophageal reflux     Created by Conversion      Essential hypertension 11/22/2006    Created by Conversion  Replacement Utility  updated for latest IMO load  Overview:  Overview:  Created by Conversion  Replacement Utility updated for latest IMO load     Fall 8/5/2020     Flatulence, eructation and gas pain 2/19/2014     Gaseous abdominal distention 9/19/2016     Gastro-esophageal reflux disease with esophagitis 6/6/2017     Generalized muscle weakness 7/4/2018     GERD (gastroesophageal reflux disease)      Hemorrhage of rectum and anus 9/19/2016     Hiatal hernia      HLD (hyperlipidemia)      Hoarseness of voice 4/6/2010     HTN (hypertension)      Hypertension      Hypokalemia 6/29/2007     Hypothyroid      Hypothyroidism 9/22/2009    Created by Conversion  Replacement Utility updated for latest IMO load  Overview:  Overview:  Created by Conversion  Replacement Utility updated for latest IMO load     Insomnia due to anxiety and fear 9/17/2018     Insomnia due to mental condition      Irritable bowel syndrome 11/22/2006     Lower back pain      Major depressive disorder, recurrent episode (H) 9/17/2018    Created by Conversion  Replacement Utility updated for latest IMO load  Overview:  Overview:  Created by Conversion  Replacement Utility updated for latest IMO load     Major depressive disorder, single episode, moderate (H)      Malaise and fatigue 7/10/2007     Metabolic encephalopathy      Migraine with aura      Mixed hyperlipidemia 2/10/2008    Created by Conversion      Mood disorder due to a general medical condition      Movement disorder 9/19/2018     Multiple system atrophy P (H)      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     Murmur      Nonrheumatic aortic valve stenosis 10/23/2020     Nontoxic multinodular goiter 11/22/2006    Overview:  thyroidectomy 7/07 for atypical cells on FNA- benign cysts seen at time of surgical pathology     Obesity, unspecified     Created by Conversion      Orthostatic hypotension dysautonomic syndrome 9/22/2017     Osteoarthritis      Partial small bowel obstruction (H)      Polyp of colon  9/19/2016     Post-op pain 12/12/2018     Postoperative hypothyroidism 1/15/2013    Overview:  7/2/07-  Thyroidectomy by Dr Wang Crenshaw     Prediabetes 10/26/2010     Primary insomnia 7/4/2017     REM sleep behavior disorder 8/5/2020     Retinal migraine 4/28/2014     Rheumatic fever     thought to have had as a child     Rupture of left Achilles tendon, sequela 12/31/2019     S/P AAA repair using bifurcation graft 11/30/2015     S/P laparoscopic cholecystectomy 12/12/2018     Scoliosis      Sleep difficulties      Small bowel obstruction (H)      Thyroid disease      Thyroid nodule     removed nodules and thyroid     Tinnitus     Created by Conversion  Replacement Utility updated for latest IMO load     Undiagnosed cardiac murmurs 11/22/2006     Vitamin D deficiency 1/15/2013     Weak 4/29/2018     Weakness 5/4/2017     Weakness of both lower extremities 7/5/2018     Past Surgical History:   Procedure Laterality Date     AAA REPAIR  2015    wtih bifurcation graft     CARPAL TUNNEL RELEASE RT/LT Bilateral 2013     HC REVISE MEDIAN N/CARPAL TUNNEL SURG      Description: Neuroplasty Decompression Median Nerve At Carpal Tunnel;  Recorded: 01/21/2013;  Comments: bilateral     HYSTERECTOMY  2013     IR ABDOMINAL ENDOVASCULAR STENT GRAFT  11/30/2015     JOINT REPLACEMENT Right 2003     JOINT REPLACEMENT       LAPAROSCOPIC CHOLECYSTECTOMY N/A 12/12/2018    Procedure: LAPAROSCOPIC CHOLECYSTECTOMY;  Surgeon: Amadeo Sebastian MD;  Location: [facility] OR     ORTHOPEDIC SURGERY       CO THYROIDECTOMY      Description: Thyroid Surgery Total Thyroidectomy;  Recorded: 06/08/2011;     SPINE SURGERY  1981    cervical spine fusion     THYROIDECTOMY  2011     XR MAJOR JOINT OR BURSA INJ/ASP BILATERAL  5/18/2020     XR MAJOR JOINT OR BURSA INJ/ASP UNILATERAL  5/18/2020     ZZC CERV SPINE FUSN,ANTER,BELOW C2      Description: Cervical Vertebral Fusion;  Recorded: 01/21/2013;  Comments: 1981     ZZC TOTAL ABDOM HYSTERECTOMY  1985    Description:  Hysterectomy;  Recorded: 01/21/2013;     [facility] TOTAL HIP ARTHROPLASTY Right     Description: Total Hip Replacement;  Recorded: 01/21/2013;  Comments: right, 2003       REVIEW OF SYSTEMS:   ROS: 10 point ROS neg other than the symptoms noted above in the HPI and here:  Palliative Symptom Review (0=no symptom/no concern, 1=mild, 2=moderate, 3=severe):      Pain: 1-2 due to chronic LBP and compression fractures--under the care of [place] [facility] and the NP at her [facility].        Fatigue: 0      Nausea: 0      Constipation: 0      Diarrhea: 0      Depressive Symptoms: 1      Anxiety: 0      Drowsiness: 1      Poor Appetite: 2      Shortness of Breath: 0      Insomnia: 0      Hallucinations:  1-2--not alarming and somewhat enjoyable.      Overall (0 good/no concerns, 3 very poor):  1-2      GENERAL APPEARANCE: chronically ill,  alert and no distress; neatly groomed  EYES: Eyes grossly normal to inspection except for left eye subconjunctival hemorrhrage inferiorly, PERRLA,  sclerae without injection or discharge, EOM intact   RESP:  no increased work of breathing; speaks in complete sentences;   MS: No musculoskeletal defects are noted  SKIN: No suspicious lesions or rashes, hydration status appears adequate with normal skin turgor   NEURO:  Mehreen 'froze' up at the end of our visit-this was a painful process and limited her ability to stand up and do a pivot transfer into a wheel chair; no tremor at rest was appreciated;  PSYCH: Alert and oriented x3; speech- coherent, normal rate and volume; able to articulate logical thoughts at times, able to abstract reason to a point, no tangential thoughts, no hallucinations or delusions, mentation appears normal, Mood is euthymic. Affect is appropriate for this mood state and bright. Thought content is free of suicidal ideation, hallucinations, and delusions.  Eye contact is good during conversation.       Data Reviewed:  No recent LAB or IMAGING results available for review.:       Impressions:  Palliative Performance Score:  (100% normal, 0% death):  30-40%  Decision Making Capacity:  Present though needs help with more nuanced decisions  PDMP review:  Yes, no concerns    Multiple system atrophy  Lewy Body Dementia  H/o multiple compression fractures  Daughter has concerns about her living situation    GOALS OF CARE:  Mehreen and her daughter are torn a bit about whether to consider enrolling in hospice at this time.  They aren't sure if a different living environment would be more salutary for her and they also want to wait to see if her radioablations might make a difference in her QOL.  I discussed with them using a burden/benefit framework as they contemplate these decisions.  I did review Mehreen's ACP document and she stands by her choices of HCAs.  I also reviewed her POLST and she confirmed her DNAR/DNI code preferences and she thinks her artificial nutrition preference was inaccurately recorded in 2020--She has no desire for any kind of artificial nutrition no matter how temporary.  Mehreen was once discharged to her [facility] with an intent to enroll in hospice and she was apparently informed by the [facility] team that she couldn't do that.  It is not clear what the rationale was.    Advance Care Planning Discussion 6/20/2023. I, Luther Pace MD met with Patient and their Health Care Agent(s) today at the clinic to discuss Advance Care Planning. Mehreen Camara does not have decisional capacity  and was present for this discussion.  Those present were informed of the voluntary nature of this discussion and wished to proceed.  The discussion included: see paragraph immediatley above this one. This discussion began at 1600 and ended at 1635 for a total of 35  minutes.   Recommendations & Counseling:  Re-do POLST with [facility] team.  No change to medications today.  Proceed with radio-ablations once the prior authorization is granted.  I asked Mehreen and her daughter to consider if enrolling  in hospice would be right for her at this time; if they conclude it is, they will let me know and I'll submit and order for the services.  Otherwise follw  Up with me in 2-3 months.    Counseling: All of the above was explained to the patient in lay language. The patient has verbalized a clear understanding of the discussion, asked appropriate questions, which have been answered to patient's apparent satisfaction. The patient is in agreement with the above plan.    92 minutes spent on the date of the encounter doing chart review, history and exam, patient education & counseling, documentation and other activities as noted above. The 92 minutes was inclusive of the ACP discussion.    Luther Pace MD MS FAAFP CAQHPM  [facility]  Office [phone]  Fax [phone]

## 2023-06-20 NOTE — LETTER
6/20/2023         RE: Mehreen Camara  [address]  [facility] [address]        Dear Colleague,    Thank you for referring your patient, Mehreen Camara, to the [facility] RADIATION ONCOLOGY [place]. Please see a copy of my visit note below.    Palliative Care Outpatient Clinic Consultation Note    Patient:  Mehreen Camara    Chief Complaint:   Mehreen Camara 83 year old female who is presenting to the palliative medicine clinic today at the request of  for a palliative care consultation secondary to MSA and early Lewy body Dementia.   The patient's primary care provider is:  Martinez Figueroa.     History of Present Illness:  83 year old with a 9 year history of MSA.  She has some early Lewy Body dementia symptoms.  She has frequent falls-the last one last weekend and she thinks she has had 7 falls in June, so far.  Mehreen has significant autonomic instability due to her MSA with a recorded >100 point BP drop on orthostatic testing.    Distressing Symptom/s:  MSK pain in back related to compression fractures, she is on scheduled oxycodone managed by the NP at [facility]; she apparently had a very good response to 'trial' ablation of some nerve roots and is now awaiting insurance [facility] authorization for a permanent ablation.    Patient's Disease Understanding:  limited due to her LB dementia    Coping:  OK when she is getting her medications and with family;     Social History  Born: [place]  Education: attended [employer] in [place] but left to work after 10th grade;  Living Situation: Memory Care at [facility]--daughter has concerns about the quality of care she is receiving and has some worries about medication diversion.  Relationships: to Tiff for >50 years; he passed away January 2014; she had been  as a young woman and  when she had two children; she had three children with Tiff.  Children: 5 children in toto; all in  the [facility]; she has ~11 grand children and 6 GGK's;   Actual/Potential Caregiver(s): team at [employer] and her daughter Eladio.  Support System: immediate family ('everyone else has ')  Occupation: cook at [employer]; soldering under a microscope for [employer]  Hobbies: gardening and hand made quiilting  Patient is 'famous for winning a bicycle through a drawing contest in the [employer] when she was 9 years old'  Substance Use/History of misuse: none  Financial Concerns: none with current set up  Spiritual Background: Mandaeism  Spiritual Concerns/Needs: watches Mass on TV once in a awhile;     Social History     Tobacco Use     Smoking status: Former     Packs/day: 0.50     Types: Cigarettes     Quit date: 1994     Years since quittin.5     Smokeless tobacco: Never   Substance Use Topics     Alcohol use: No     Drug use: No       Family History  Family History   Problem Relation Age of Onset     Hypertension Mother      Coronary Artery Disease Mother      Coronary Artery Disease Father      Other Cancer Brother      Parkinsonism Other      Heart Disease Mother      Heart Disease Father      Cancer Brother 57.00        colon     Hypertension Daughter      Hypertension Daughter      Neuropathy Daughter      Lupus Daughter      Heart Disease Paternal Aunt      Heart Disease Paternal Uncle      Parkinsonism Paternal Uncle      Patient's Involvement with Prior History of Serious Illness in Family:   at home from lung cancer    Advance Care Planning:  Advance Directive:   ACP and POLST in chart  Where is written copy located: in [facility]  Health Care Agent Contact Information: daughter Eladio, who was present today  POLST:   Present in Epic and it needs to be updated reflecting her preference to not have any artificial nutrition  CODE STATUS: DNAR/DNI    Allergies   Allergen Reactions     Penicillins Anaphylaxis, Rash and Shortness Of Breath     Aspirin  Nausea and GI Disturbance     Atenolol Cough     Atorvastatin Muscle Pain (Myalgia) and Nausea and Vomiting     Bupropion Nausea     Cephalexin Rash     Localized to neck area     Clindamycin Other (See Comments)     Constipation      Codeine Nausea     Ibuprofen Nausea and GI Disturbance     Lovastatin Muscle Pain (Myalgia)     Current Outpatient Medications   Medication Sig Dispense Refill     acetaminophen (TYLENOL) 500 MG tablet Take 2 tablets (1,000 mg) by mouth 3 times daily       bisacodyl (DULCOLAX) 10 MG suppository Place 10 mg rectally daily as needed for constipation       carbidopa-levodopa (SINEMET)  MG tablet Take carbidopa/levodopa IR 25/100 mg 1 tab at 7 am, 1 pm and 8 pm = 3 tabs daily 270 tablet 3     diclofenac (VOLTAREN) 1 % topical gel Apply 2 g topically 3 times daily as needed for moderate pain       DULoxetine (CYMBALTA) 60 MG capsule Take 60 mg by mouth daily       gabapentin (NEURONTIN) 300 MG capsule Take 1 capsule (300 mg) by mouth At Bedtime 30 capsule 0     gabapentin (NEURONTIN) 300 MG capsule Take 300 mg by mouth 2 times daily AM and 1345       hydrALAZINE (APRESOLINE) 10 MG tablet Take 10 mg by mouth 3 times daily       lactobacillus rhamnosus (GG) (CULTURELL) capsule Take 1 capsule by mouth daily       levothyroxine (SYNTHROID/LEVOTHROID) 100 MCG tablet Take 1 tablet (100 mcg) by mouth daily       lidocaine (LIDODERM) 5 % patch Place 1 patch onto the skin every 24 hours To prevent lidocaine toxicity, patient should be patch free for 12 hrs daily.       losartan (COZAAR) 50 MG tablet Take 50 mg by mouth 2 times daily       naloxone (NARCAN) 4 MG/0.1ML nasal spray Spray 4 mg into one nostril alternating nostrils once as needed for opioid reversal every 2-3 minutes until assistance arrives       nystatin (MYCOSTATIN) 638087 UNIT/GM external cream Apply topically 2 times daily APPLY TOPICALLY TO AREA UNDER  right BREAST       oxyCODONE (ROXICODONE) 5 MG tablet Take 1 tablet (5 mg)  by mouth 3 times daily 15 tablet 0     oxyCODONE (ROXICODONE) 5 MG tablet Take 2.5 mg by mouth every 6 hours as needed for severe pain       polyethylene glycol (MIRALAX) 17 GM/Dose powder Take 17 g by mouth 2 times daily 1020 g 3     polyethylene glycol-propylene glycol (SYSTANE ULTRA) 0.4-0.3 % SOLN ophthalmic solution Place 2 drops into both eyes every hour as needed for dry eyes       polyethylene glycol-propylene glycol (SYSTANE ULTRA) 0.4-0.3 % SOLN ophthalmic solution Place 2 drops into both eyes 3 times daily (and additionally as needed/requested)       QUEtiapine (SEROQUEL) 25 MG tablet Take 1 tablet (25 mg) by mouth At Bedtime       senna-docusate (SENOKOT-S/PERICOLACE) 8.6-50 MG tablet Take 1 tablet by mouth daily       senna-docusate (SENOKOT-S/PERICOLACE) 8.6-50 MG tablet Take 1 tablet by mouth 2 times daily as needed for constipation       simethicone (MYLICON) 125 MG chewable tablet Take 125 mg by mouth 2 times daily (Patient not taking: Reported on 3/23/2023)       Simethicone 125 MG TABS Take 1 chew tab by mouth 2 times daily as needed bloating (Patient not taking: Reported on 3/23/2023)       sodium phosphate (FLEET ENEMA) 7-19 GM/118ML rectal enema Place 1 enema rectally once as needed for constipation       vitamin D3 (CHOLECALCIFEROL) 50 mcg (2000 units) tablet Take 1 tablet (50 mcg) by mouth daily       Past Medical History:   Diagnosis Date     AAA (abdominal aortic aneurysm)      Abdominal pain, epigastric 12/16/2013     Abnormal computed tomography scan 9/15/2016     Abnormal finding on imaging 9/15/2016     Acute encephalopathy 9/26/2015     Adjustment disorder with anxious mood 9/17/2018     Adjustment disorder with mixed anxiety and depressed mood 9/17/2018     Adrenal adenoma     stable, thought not to be hormonally active     Adrenal adenoma      Agitation      Anemia 11/18/2010     Aneurysm of abdominal vessel 2/25/2008    Created by [facility] Annotation: Jun 8 2011   8:Danni French: 4.4 on 11/2013.   Needs 6-12 month u/s or CT.  Replacement Utility updated for latest IMO load  Overview:  2/08 Abd US: 5.4 cm. 3/08 CT Abd: 2.7 x 3.3. 9/08:  MRI Abd: 2.7X3.2  Next 9/09.     Anxiety state 2/26/2008     Arthritis      Asymptomatic postmenopausal status     Created by Conversion  Replacement Utility updated for latest IMO load     Back pain 5/16/2020     Harris esophagus      Harris's esophagus 1/11/2012    Created by [email or web address] [facility] Annotation: Feb  3 2012  8:32Cameron Tejada: Needs EGD 2/2017   Overview:  Overview:  Created by [email or web address] [facility] Annotation: Feb  3 2012  8:32Cameron Tejada: Needs EGD 2/2017     Benign neoplasm of ascending colon 9/19/2016     Bilateral knee pain 8/5/2020     Bloating 5/5/2017     Bradycardia 9/17/2018     Cataract 9/17/2018    Created by Conversion   Overview:  Overview:  Created by Conversion     Chest wall pain 1/20/2013     Chronic low back pain 9/29/2016     Cognitive and behavioral changes      Compression fracture of lumbar vertebra (H) 10/10/2020     Constipation      Contact dermatitis and eczema 11/22/2006     DDD (degenerative disc disease), lumbosacral 2/26/2008     Depression      Depression, major 7/13/2009     Depressive disorder      Diaphragmatic hernia 9/18/2018    Created by Conversion  Replacement Utility updated for latest IMO load  Overview:  Overview:  Created by Conversion  Replacement Utility updated for latest IMO load     Dietary counseling and surveillance 9/15/2016     Disorder of bone and cartilage 9/17/2018    Created by [email or web address] [facility] Annotation: Dec 13 2012  7:41Roberta Kelly: vit D/Ca, f/u  Dexa needed 1/2014.  Replacement Utility updated for latest IMO load  Overview:  Overview:  Created by [email or web address] [facility] Annotation: Dec 13 2012  7:41Roberta Kelly: vit D/Ca, f/u  Dexa needed 1/2014.  Replacement Utility updated for latest IMO load     Diverticulosis of  colon 12/18/2014     Dizziness and giddiness     Created by Conversion      Dysphagia 12/17/2013     Early satiety 12/16/2013     Esophageal reflux     Created by Conversion      Essential hypertension 11/22/2006    Created by Conversion  Replacement Utility updated for latest IMO load  Overview:  Overview:  Created by Conversion  Replacement Utility updated for latest IMO load     Fall 8/5/2020     Flatulence, eructation and gas pain 2/19/2014     Gaseous abdominal distention 9/19/2016     Gastro-esophageal reflux disease with esophagitis 6/6/2017     Generalized muscle weakness 7/4/2018     GERD (gastroesophageal reflux disease)      Hemorrhage of rectum and anus 9/19/2016     Hiatal hernia      HLD (hyperlipidemia)      Hoarseness of voice 4/6/2010     HTN (hypertension)      Hypertension      Hypokalemia 6/29/2007     Hypothyroid      Hypothyroidism 9/22/2009    Created by Conversion  Replacement Utility updated for latest IMO load  Overview:  Overview:  Created by Conversion  Replacement Utility updated for latest IMO load     Insomnia due to anxiety and fear 9/17/2018     Insomnia due to mental condition      Irritable bowel syndrome 11/22/2006     Lower back pain      Major depressive disorder, recurrent episode (H) 9/17/2018    Created by Conversion  Replacement Utility updated for latest IMO load  Overview:  Overview:  Created by Conversion  Replacement Utility updated for latest IMO load     Major depressive disorder, single episode, moderate (H)      Malaise and fatigue 7/10/2007     Metabolic encephalopathy      Migraine with aura      Mixed hyperlipidemia 2/10/2008    Created by Conversion      Mood disorder due to a general medical condition      Movement disorder 9/19/2018     Multiple system atrophy P (H)      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     Murmur      Nonrheumatic aortic valve stenosis 10/23/2020     Nontoxic multinodular goiter 11/22/2006    Overview:  thyroidectomy 7/07 for atypical  cells on FNA- benign cysts seen at time of surgical pathology     Obesity, unspecified     Created by Conversion      Orthostatic hypotension dysautonomic syndrome 9/22/2017     Osteoarthritis      Partial small bowel obstruction (H)      Polyp of colon 9/19/2016     Post-op pain 12/12/2018     Postoperative hypothyroidism 1/15/2013    Overview:  7/2/07-  Thyroidectomy by Dr Wang Crenshaw     Prediabetes 10/26/2010     Primary insomnia 7/4/2017     REM sleep behavior disorder 8/5/2020     Retinal migraine 4/28/2014     Rheumatic fever     thought to have had as a child     Rupture of left Achilles tendon, sequela 12/31/2019     S/P AAA repair using bifurcation graft 11/30/2015     S/P laparoscopic cholecystectomy 12/12/2018     Scoliosis      Sleep difficulties      Small bowel obstruction (H)      Thyroid disease      Thyroid nodule     removed nodules and thyroid     Tinnitus     Created by Conversion  Replacement Utility updated for latest IMO load     Undiagnosed cardiac murmurs 11/22/2006     Vitamin D deficiency 1/15/2013     Weak 4/29/2018     Weakness 5/4/2017     Weakness of both lower extremities 7/5/2018     Past Surgical History:   Procedure Laterality Date     AAA REPAIR  2015    wt bifurcation graft     CARPAL TUNNEL RELEASE RT/LT Bilateral 2013     HC REVISE MEDIAN N/CARPAL TUNNEL SURG      Description: Neuroplasty Decompression Median Nerve At Carpal Tunnel;  Recorded: 01/21/2013;  Comments: bilateral     HYSTERECTOMY  2013     IR ABDOMINAL ENDOVASCULAR STENT GRAFT  11/30/2015     JOINT REPLACEMENT Right 2003     JOINT REPLACEMENT       LAPAROSCOPIC CHOLECYSTECTOMY N/A 12/12/2018    Procedure: LAPAROSCOPIC CHOLECYSTECTOMY;  Surgeon: Amadeo Sebastian MD;  Location: [facility] OR     ORTHOPEDIC SURGERY       AL THYROIDECTOMY      Description: Thyroid Surgery Total Thyroidectomy;  Recorded: 06/08/2011;     SPINE SURGERY  1981    cervical spine fusion     THYROIDECTOMY  2011     XR MAJOR JOINT OR BURSA INJ/ASP  BILATERAL  5/18/2020     XR MAJOR JOINT OR BURSA INJ/ASP UNILATERAL  5/18/2020     [facility] CERV SPINE FUSN,ANTER,BELOW C2      Description: Cervical Vertebral Fusion;  Recorded: 01/21/2013;  Comments: 1981     [facility] TOTAL ABDOM HYSTERECTOMY  1985    Description: Hysterectomy;  Recorded: 01/21/2013;     [facility] TOTAL HIP ARTHROPLASTY Right     Description: Total Hip Replacement;  Recorded: 01/21/2013;  Comments: right, 2003       REVIEW OF SYSTEMS:   ROS: 10 point ROS neg other than the symptoms noted above in the HPI and here:  Palliative Symptom Review (0=no symptom/no concern, 1=mild, 2=moderate, 3=severe):      Pain: 1-2 due to chronic LBP and compression fractures--under the care of [facility] and the NP at her [facility].        Fatigue: 0      Nausea: 0      Constipation: 0      Diarrhea: 0      Depressive Symptoms: 1      Anxiety: 0      Drowsiness: 1      Poor Appetite: 2      Shortness of Breath: 0      Insomnia: 0      Hallucinations:  1-2--not alarming and somewhat enjoyable.      Overall (0 good/no concerns, 3 very poor):  1-2      GENERAL APPEARANCE: chronically ill,  alert and no distress; neatly groomed  EYES: Eyes grossly normal to inspection except for left eye subconjunctival hemorrhrage inferiorly, PERRLA,  sclerae without injection or discharge, EOM intact   RESP:  no increased work of breathing; speaks in complete sentences;   MS: No musculoskeletal defects are noted  SKIN: No suspicious lesions or rashes, hydration status appears adequate with normal skin turgor   NEURO:  Mehreen 'froze' up at the end of our visit-this was a painful process and limited her ability to stand up and do a pivot transfer into a wheel chair; no tremor at rest was appreciated;  PSYCH: Alert and oriented x3; speech- coherent, normal rate and volume; able to articulate logical thoughts at times, able to abstract reason to a point, no tangential thoughts, no hallucinations or delusions, mentation appears normal, Mood is euthymic.  Affect is appropriate for this mood state and bright. Thought content is free of suicidal ideation, hallucinations, and delusions.  Eye contact is good during conversation.       Data Reviewed:  No recent LAB or IMAGING results available for review.:      Impressions:  Palliative Performance Score:  (100% normal, 0% death):  30-40%  Decision Making Capacity:  Present though needs help with more nuanced decisions  PDMP review:  Yes, no concerns    Multiple system atrophy  Lewy Body Dementia  H/o multiple compression fractures  Daughter has concerns about her living situation    GOALS OF CARE:  Mehreen and her daughter are torn a bit about whether to consider enrolling in hospice at this time.  They aren't sure if a different living environment would be more salutary for her and they also want to wait to see if her radioablations might make a difference in her QOL.  I discussed with them using a burden/benefit framework as they contemplate these decisions.  I did review Mehreen's ACP document and she stands by her choices of HCAs.  I also reviewed her POLST and she confirmed her DNAR/DNI code preferences and she thinks her artificial nutrition preference was inaccurately recorded in 2020--She has no desire for any kind of artificial nutrition no matter how temporary.  Mehreen was once discharged to her [facility] with an intent to enroll in hospice and she was apparently informed by the [facility] team that she couldn't do that.  It is not clear what the rationale was.    Advance Care Planning Discussion 6/20/2023. I, Luther Pace MD met with Patient and their Health Care Agent(s) today at the clinic to discuss Advance Care Planning. Mehreen Camara does not have decisional capacity  and was present for this discussion.  Those present were informed of the voluntary nature of this discussion and wished to proceed.  The discussion included: see paragraph immediatley above this one. This discussion began at 1600 and ended at  "1635 for a total of 35  minutes.   Recommendations & Counseling:  Re-do POLST with [facility] team.  No change to medications today.  Proceed with radio-ablations once the prior authorization is granted.  I asked Mehreen and her daughter to consider if enrolling in hospice would be right for her at this time; if they conclude it is, they will let me know and I'll submit and order for the services.  Otherwise follw  Up with me in 2-3 months.    Counseling: All of the above was explained to the patient in lay language. The patient has verbalized a clear understanding of the discussion, asked appropriate questions, which have been answered to patient's apparent satisfaction. The patient is in agreement with the above plan.    92 minutes spent on the date of the encounter doing chart review, history and exam, patient education & counseling, documentation and other activities as noted above. The 92 minutes was inclusive of the ACP discussion.    Luther Pace MD MS FAAFP CAQHPM  [facility] Palliative Care Service  Office [phone]  Fax [phone]           Oncology Rooming Note    June 20, 2023 3:58 PM   Mehreen Camara is a 83 year old female who presents for:    Chief Complaint   Patient presents with     Oncology Clinic Visit     Palliative Care consult     Initial Vitals: BP (!) 145/78   Pulse 77   Resp 16   Wt 62.6 kg (138 lb)   SpO2 95%   BMI 22.27 kg/m   Estimated body mass index is 22.27 kg/m  as calculated from the following:    Height as of 11/8/22: 1.676 m (5' 6\").    Weight as of this encounter: 62.6 kg (138 lb). Body surface area is 1.71 meters squared.  Data Unavailable Comment: Data Unavailable   No LMP recorded. Patient is postmenopausal.  Allergies reviewed: Yes  Medications reviewed: Yes    Medications: Medication refills not needed today.  Pharmacy name entered into [employer]:    [facility] - [facility] - [address] - [place] - [address]    Clinical concerns: Falls, pt and daughter voice biggest concerns about the care she receives at her living facility.  Dr. Pace was notified.      Jana Hart RN                  Again, thank you for allowing me to participate in the care of your patient.        Sincerely,        Luther Pace MD

## 2023-07-18 NOTE — Clinical Note
FYI Interventional Radiology    Evaluate for Procedure:     HPI: 80y Male with HTN, HLD, CAD, HFrEF, CKD3, COPD, recent diagnosis of bladder cancer transferred from [place] for hematuria iso newly diagnosed muscular invasive bladder cancer, HARLEEN, symptomatic anemia.   Pt was hospitalized at [facility] from 7/13 for gross hematuria. Course c/b HARLEEN, Cr of 5.0 from baseline 2.01 on 7/10/2023. At [place] [facility], pt evaluated by urology and given low PVR ~11cc and bladder mostly filled with mass, rodriguez would have increased pain and not but of utility to patient so deferred.     Im[facility] from [facility] in chart  CT C/A/P noncon (7/13): diffuse hyperattenuation of R kidney and ureter are favored to represent hemorrhage, primary ddx would be malignancy. Bladder hyperdensity favored to represent active hemorrhage into urinary bladder subacute clot. Numerous pulm nodules, several cavitating. Hepatic cirrhosis    Allergies: azithromycin (Nausea)        Medications (Abx/Cardiac/Anticoagulation/Blood Products)    doxycycline monohydrate Capsule: 100 milliGRAM(s) Oral (07-18 @ 18:03)  piperacillin/tazobactam IVPB.: 200 mL/Hr IV Intermittent (07-17 @ 12:37)  piperacillin/tazobactam IVPB.-: 25 mL/Hr IV Intermittent (07-17 @ 15:58)  piperacillin/tazobactam IVPB..: 25 mL/Hr IV Intermittent (07-18 @ 18:04)      Home Medications  Albuterol (Eqv-ProAir HFA) 90 mcg/inh inhalation aerosol: 2 puff(s) inhaled every 6 hours as needed for  shortness of breath and/or wheezing  diazePAM 5 mg oral tablet: 1 tab(s) orally once a day (at bedtime) NOTE: As per Pharmacy, 1 tablet orally 3 times a day as needed for anxiety  FLUoxetine 40 mg oral capsule: 1 cap(s) orally once a day  Folbic oral tablet: 1 tab(s) orally once a day  metoprolol succinate 25 mg oral tablet, extended release: 1 tab(s) orally once a day  mirtazapine 7.5 mg oral tablet: 1 tab(s) orally once a day (at bedtime)  simvastatin 40 mg oral tablet: 1 tab(s) orally once a day  tamsulosin 0.4 mg oral capsule: 1 cap(s) orally once a day (at bedtime)  Trelegy Ellipta 100 mcg-62.5 mcg-25 mcg/inh inhalation powder: 1 puff(s) inhaled once a day      Data:    T(C): 36.3  HR: 58  BP: 101/51  RR: 16  SpO2: 98%    -WBC 12.90 / HgB 8.6 / Hct 27.2 / Plt 193  -Na 137 / Cl 97 / BUN 93 / Glucose 100  -K 4.5 / CO2 20 / Cr 9.54  -ALT 10 / Alk Phos 61 / T.Bili <0.2  -INR 1.16 / PTT 35.6    Radiology:     Assessment/Plan:   -80y Male with HTN, HLD, CAD, HFrEF, CKD3, COPD, recent diagnosis of bladder cancer transferred from [place] for hematuria iso newly diagnosed muscular invasive bladder cancer, HARLEEN, symptomatic anemia. Developed worsening HARLEEN with Cr 9 from baseline of 2. Nephrology concerned for ATN. IR consulted for bilateral nephrostomy tube placement given new left sided hydronephrosis on ultrasound and known right sided hydronephrosis concerning for obstructive uropathy.    Case discussed with Dr. Olivera. If patient and family are amenable to have procedure done with understanding of potential long term morbidity with bilateral nephrostomy tubes, IR will plan to perform bilateral percutaneous nephrostomy tube placements.    RECOMMENDATIONS:    - Please place order for IR Procedure "Bilateral percutaneous nephrostomy tubes", approving attending Dr. Olivera  - Please complete IR pre-procedure note   - NPO past midnight evening prior to procedure (@1159PM 7/18/23)  - continue to hold therapeutic and prophylactic anticoagulants; contact IR in AM if pt required new AC overnight  - maintain active type and screen x 2  - Ensure the pt can and will provide consent; if alternative/family consent is required; please ensure the number is in the chart/handoff       Rona Mckeon MD  PGY-3, Interventional Radiology    -Available on [employer] TEAMS for all non-urgent questions  -Emergent issues: [facility]-p.[phone]; [facility]-p.[phone] ([phone])  -Non-emergent consults: Please place a [facility] order "Consult-Interventional Radiology" with an appropriate callback number  -Scheduling questions: [facility]: [phone]; [facility]: [phone]  -Clinic/Outpatient booking: [facility]: [phone]; [facility]: [phone]

## 2023-10-26 PROBLEM — S22.43XA CLOSED FRACTURE OF MULTIPLE RIBS OF BOTH SIDES, INITIAL ENCOUNTER: Status: ACTIVE | Noted: 2023-01-01

## 2023-10-26 PROBLEM — R29.6 FALLS FREQUENTLY: Status: ACTIVE | Noted: 2023-01-01

## 2023-10-26 PROBLEM — S72.142A DISPLACED INTERTROCHANTERIC FRACTURE OF LEFT FEMUR, INITIAL ENCOUNTER FOR CLOSED FRACTURE (H): Status: ACTIVE | Noted: 2023-01-01

## 2023-10-26 PROBLEM — S72.145A CLOSED NONDISPLACED INTERTROCHANTERIC FRACTURE OF LEFT FEMUR, INITIAL ENCOUNTER (H): Status: ACTIVE | Noted: 2023-01-01

## 2023-10-26 NOTE — ED NOTES
Bed: [facility]  Expected date:   Expected time:   Means of arrival:   Comments:  Fall, memory care

## 2023-10-26 NOTE — ED TRIAGE NOTES
Pt arrives to ED via EMS from [facility]. Pt with frequent falls. Pt fell yesterday at 11 AM. Left leg is shortened and internally rotated. Unknown if pt hit head. Percocet PRN, last dose was this morning at [facility]. Pt is not on anticoagulation. EMS report VSS , HR 70, 94% RA. Dr Caldwell at bedside for eval upon arrival. Pt is alert to self. DNR/I. Family on their way.    0945. Dtr arrives at bedside. Dtr states that fall was unwitnessed. Pt was found on the floor between her bed and recliner.

## 2023-10-26 NOTE — ED PROVIDER NOTES
EMERGENCY DEPARTMENT ENCOUNTER      NAME: Mehreen Camara  AGE: 83 year old female  YOB: 1940  MRN: [id number]  EVALUATION DATE & TIME: 10/26/2023  9:18 AM    PCP: Dede Arriola    ED PROVIDER: Kailyn Caldwell MD      Chief Complaint   Patient presents with    Fall         FINAL IMPRESSION:  1. Closed nondisplaced intertrochanteric fracture of left femur, initial encounter (H)    2. Displaced intertrochanteric fracture of left femur, initial encounter for closed fracture (H)    3. Falls frequently    4. Closed fracture of multiple ribs of both sides, initial encounter          ED COURSE & MEDICAL DECISION MAKING:    Pertinent Labs & Imaging studies reviewed. (See chart for details)    9:16 AM I introduced myself to the patient, obtained patient history, performed a physical exam, and discussed plan for ED workup including potential diagnostic laboratory/imaging studies and interventions.  1:15 PM Rechecked patient. Updated patient and patient's daughter.  1:26 PM Spoke with the Hospitalist, Dr. Daley, who accepts the patient for admission.    83 year old female with a pertinent medical history of Lewy body dementia, orthostatic hypotension, dysautonomic syndrome, burst fracture of thoracic vertebra, compression fracture of lumbar vertebra, DDD, arthritis, osteopenia, multiple system atrophy, chronic low back pain, acute encephalopathy, anemia, aortic valve stenosis, AAA s/p repair, hypothyroidism, HLD, HTN, obesity, who presents to this ED for evaluation of a fall.  Patient is in memory care and has dementia and unfortunate very frequent falls.  Per daughter she can fall multiple times almost every day.  Last fell reportedly at 11 PM last night and the facility had been giving her as needed Percocet for pain but she was having worsening pain specifically of her left leg area and thus they sent her in.  They typically do x-rays at the facility due to her frequent falls.  I am not able to  access these and uncertain whether they did any x-rays related to this most recent fall.  She has pain over the left hip and down into the left leg as well as the neck and back and abdomen.  She is generally tender in the abdomen.  Did not have specific chest wall tenderness to palpation.  No signs of respiratory distress.  She is at her mental baseline per daughter.  Vital signs here are within normal limits and she is afebrile.  With her frequent falls, had concern for multiple areas of potential injury.  Her left leg does appear somewhat shortened and slightly rotated concerning for possible femur fracture.  Laboratory studies obtained.  Obtain CT of the head and neck without contrast as well as CT of the chest Abdo pelvis with contrast, x-ray of the left femur, x-ray of the pelvis, x-ray of the left tibia and fibula, and CT of thoracic and lumbar spine.  She was given a small dose of Dilaudid IV here for pain control.    EKG obtained which is unchanged from prior without signs of acute ischemia.  She is in sinus rhythm.  Laboratory studies largely unrevealing as below.  CT of the head and cervical spine without acute pathology. X-ray of the left femur and pelvis reveals a displaced varus angulated intertrochanteric left proximal femur fracture with separate lesser trochanter avulsive fracture fragment.  No dislocation of the left femoral head from the acetabulum.  Chronic healed superior and inferior pubic rami fractures on each side.  X-ray of the left tibia and fibula does not reveal any acute displaced fracture.  Left total knee arthroplasty with patellar resurfacing.  No sizable left knee joint effusion.  Partially seen fractures of the distal aspects of the left fourth and fifth metatarsals with degenerative change in the left foot.  Did add on a left foot x-ray at this point which is pending.  CT of chest and pelvis reveals comminuted moderately displaced left intertrochanteric femur fracture.  No  additional acute traumatic abnormality in the chest abdomen or pelvis with spine reported separately.  There are subacute right anterior 2nd-3rd and left 3rd-6th rib fractures but these are not acute.  She denies any chest pain at this time and did not have any reproducible tenderness so these are likely from a prior fall not this current most recent fall.  She has chronic T12 L3-L4-L5 endplate deformities.  No acute lumbar spine fracture.  No acute thoracic spine fracture.  She appears neurovascularly intact in the extremities.  No signs of compartment syndrome.  Did consult orthopedics who will evaluate the patient.  Discussed the potential of surgery with the patient and her daughter and the daughter does feel that the patient would want to go through with this if recommended and Ortho will see the patient.  Orthopedics did see the patient and plan to do operative intervention.  Plan to admit to the hospitalist.  I spoke with the hospitalist who accepted the patient for admission.  Patient was admitted in stable condition.         At the conclusion of the encounter I discussed the results of all of the tests and the disposition. The questions were answered. The patient or family acknowledged understanding and was agreeable with the care plan.       Medical Decision Making    History:  Supplemental history from: Other: ED Nurse  External Record(s) reviewed: Documented in chart, if applicable. and Inpatient Record: [facility] admission on 11/7/22 until 11/9/22    Work Up:  Chart documentation includes differential considered and any EKGs or imaging independently interpreted by provider.  In additional to work up documented, I considered the following work up: Documented in chart, if applicable.    External consultation:  Discussion of management with another provider: Documented in chart, if applicable and Hospitalist    Complicating factors:  Care impacted by chronic illness: Chronic  Pain, Dementia, Hyperlipidemia, Hypertension, Mental Health, and Other: Arthritis, osteopenia, hypothyroidism  Care affected by social determinants of health: N/A    Disposition considerations: Admit.      MEDICATIONS GIVEN IN THE EMERGENCY:  Medications   DULoxetine (CYMBALTA) DR capsule 60 mg (has no administration in time range)   gabapentin (NEURONTIN) capsule 100 mg (has no administration in time range)   gabapentin (NEURONTIN) capsule 300 mg (has no administration in time range)   lactobacillus rhamnosus (GG) (CULTURELL) capsule 1 capsule (has no administration in time range)   levothyroxine (SYNTHROID/LEVOTHROID) tablet 100 mcg (has no administration in time range)   polyethylene glycol (MIRALAX) powder 17 g (has no administration in time range)   polyethylene glycol-propylene glycol (SYSTANE ULTRA) ophthalmic solution 2 drop (has no administration in time range)   QUEtiapine (SEROquel) tablet 25 mg (has no administration in time range)   senna-docusate (SENOKOT-S/PERICOLACE) 8.6-50 MG per tablet 1 tablet (has no administration in time range)   HYDROmorphone (PF) (DILAUDID) injection 0.5 mg (0.5 mg Intravenous $Given 10/26/23 1033)   iopamidol (ISOVUE-370) solution 90 mL (90 mLs Intravenous $Given 10/26/23 1105)   HYDROmorphone (PF) (DILAUDID) injection 0.25 mg (0.25 mg Intravenous $Given 10/26/23 1240)   HYDROmorphone (PF) (DILAUDID) injection 0.25 mg (0.25 mg Intravenous $Given 10/26/23 1338)   carbidopa-levodopa (SINEMET)  MG per tablet 1 tablet (1 tablet Oral $Given 10/26/23 1338)       NEW PRESCRIPTIONS STARTED AT TODAY'S ER VISIT  New Prescriptions    No medications on file          =================================================================    HPI    Patient information was obtained from: EMS and Patient, Patient's daughter     Patient is a poor historian due to dementia.     Use of : N/A     Mehreen Orellanarichsherifshadia is a 83 year old female with a pertinent medical history of Lewy  body dementia, orthostatic hypotension, dysautonomic syndrome, burst fracture of thoracic vertebra, compression fracture of lumbar vertebra, DDD, arthritis, osteopenia, multiple system atrophy, chronic low back pain, acute encephalopathy, anemia, aortic valve stenosis, AAA s/p repair, hypothyroidism, HLD, HTN, obesity, who presents to this ED for evaluation of a fall.     Per EMS, the patient has frequent falls, so frequent that she regularly receives XR imaging at her memory care living facility, and the patient last fell yesterday at approximately 11:00 AM. The patient was being provided Percocet PRN for pains secondary to this recent fall (last given this morning). Patient does not take any blood thinners. EMS report VSS, , HR 70, 94% RA.     Patient reports that yesterday she was walking when her legs suddenly gave out causing her to go up in the air and then fall landing on her left hip and hitting her head. No known loss of consciousness. She endorses developing neck pain, abdominal pain (mild), left hip pain, back pain, and left leg pain secondary to the fall. She denies any recent chest pain, shortness of breath, numbness, or any other complaints at this time.     Per Chart Review, patient was admitted to [facility] on 11/7/22 until 11/9/22 after presenting  from a memory care unit complaining of chest tightness. In the ER she was found to have significantly elevated blood pressures and then began to complain of diffuse whole body pain. She had multiple imaging studies and was admitted to the hospital. Patient had acute on chronic diffuse pain/T12 compression fracture. Patient was seen by pain team and meds adjusted slightly. Patient was seen by spine and they recommended TLSO and outpatient follow-up, and felt she had multiple reasons for back pain. Initially blood pressures were quite elevated., but time of discharge blood pressure 111/74. Serial troponins were negative.   ECG without any acute findings. Patient was discharged back to memory care unit on prehospital meds and with prescriptions of Robaxin and Miacalcin.       REVIEW OF SYSTEMS   Review of Systems   Pertinent positives and negatives are documented in the HPI. All other systems reviewed and are negative.      PAST MEDICAL HISTORY:  Past Medical History:   Diagnosis Date    AAA (abdominal aortic aneurysm)     Abdominal pain, epigastric 12/16/2013    Abnormal computed tomography scan 9/15/2016    Abnormal finding on imaging 9/15/2016    Acute encephalopathy 9/26/2015    Adjustment disorder with anxious mood 9/17/2018    Adjustment disorder with mixed anxiety and depressed mood 9/17/2018    Adrenal adenoma     stable, thought not to be hormonally active    Adrenal adenoma     Agitation     Anemia 11/18/2010    Aneurysm of abdominal vessel 2/25/2008    Created by [facility] Annotation: Jun 8 2011  8:07AM - Danni Ortiz: 4.4 on 11/2013.   Needs 6-12 month u/s or CT.  Replacement Utility updated for latest IMO load  Overview:  2/08 Abd US: 5.4 cm. 3/08 CT Abd: 2.7 x 3.3. 9/08:  MRI Abd: 2.7X3.2  Next 9/09.    Anxiety state 2/26/2008    Arthritis     Asymptomatic postmenopausal status     Created by Conversion  Replacement Utility updated for latest IMO load    Back pain 5/16/2020    Harris esophagus     Harris's esophagus 1/11/2012    Created by [facility] Annotation: Feb  3 2012  8:32Cameron Tejada: Needs EGD 2/2017   Overview:  Overview:  Created by [facility] Annotation: Feb  3 2012  8:32Cameron Tejada: Needs EGD 2/2017    Benign neoplasm of ascending colon 9/19/2016    Bilateral knee pain 8/5/2020    Bloating 5/5/2017    Bradycardia 9/17/2018    Cataract 9/17/2018    Created by Conversion   Overview:  Overview:  Created by Conversion    Chest wall pain 1/20/2013    Chronic low back pain 9/29/2016    Cognitive and behavioral changes     Compression fracture of lumbar  vertebra (H) 10/10/2020    Constipation     Contact dermatitis and eczema 11/22/2006    DDD (degenerative disc disease), lumbosacral 2/26/2008    Depression     Depression, major 7/13/2009    Depressive disorder     Diaphragmatic hernia 9/18/2018    Created by Conversion  Replacement Utility updated for latest IMO load  Overview:  Overview:  Created by Conversion  Replacement Utility updated for latest IMO load    Dietary counseling and surveillance 9/15/2016    Disorder of bone and cartilage 9/17/2018    [facility]notation: Dec 13 2012  7:Roberta Padron: vit D/Ca, f/u  Dexa needed 1/2014.  Replacement Utility updated for latest IMO load  Overview:  Overview:  Created by [facility] Annotation: Dec 13 2012  7:Roberta Padron: vit D/Ca, f/u  Dexa needed 1/2014.  Replacement Utility updated for latest IMO load    Diverticulosis of colon 12/18/2014    Dizziness and giddiness     Created by Conversion     Dysphagia 12/17/2013    Early satiety 12/16/2013    Esophageal reflux     Created by Conversion     Essential hypertension 11/22/2006    Created by Conversion  Replacement Utility updated for latest IMO load  Overview:  Overview:  Created by Conversion  Replacement Utility updated for latest IMO load    Fall 8/5/2020    Flatulence, eructation and gas pain 2/19/2014    Gaseous abdominal distention 9/19/2016    Gastro-esophageal reflux disease with esophagitis 6/6/2017    Generalized muscle weakness 7/4/2018    GERD (gastroesophageal reflux disease)     Hemorrhage of rectum and anus 9/19/2016    Hiatal hernia     HLD (hyperlipidemia)     Hoarseness of voice 4/6/2010    HTN (hypertension)     Hypertension     Hypokalemia 6/29/2007    Hypothyroid     Hypothyroidism 9/22/2009    Created by Conversion  Replacement Utility updated for latest IMO load  Overview:  Overview:  Created by Conversion  Replacement Utility updated for latest IMO load    Insomnia due to anxiety and  fear 9/17/2018    Insomnia due to mental condition     Irritable bowel syndrome 11/22/2006    Lower back pain     Major depressive disorder, recurrent episode (H) 9/17/2018    Created by Conversion  Replacement Utility updated for latest IMO load  Overview:  Overview:  Created by Conversion  Replacement Utility updated for latest IMO load    Major depressive disorder, single episode, moderate (H)     Malaise and fatigue 7/10/2007    Metabolic encephalopathy     Migraine with aura     Mixed hyperlipidemia 2/10/2008    Created by Conversion     Mood disorder due to a general medical condition     Movement disorder 9/19/2018    Multiple system atrophy P (H)     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    Murmur     Nonrheumatic aortic valve stenosis 10/23/2020    Nontoxic multinodular goiter 11/22/2006    Overview:  thyroidectomy 7/07 for atypical cells on FNA- benign cysts seen at time of surgical pathology    Obesity, unspecified     Created by Conversion     Orthostatic hypotension dysautonomic syndrome 9/22/2017    Osteoarthritis     Partial small bowel obstruction (H)     Polyp of colon 9/19/2016    Post-op pain 12/12/2018    Postoperative hypothyroidism 1/15/2013    Overview:  7/2/07-  Thyroidectomy by Dr Wang Crenshaw    Prediabetes 10/26/2010    Primary insomnia 7/4/2017    REM sleep behavior disorder 8/5/2020    Retinal migraine 4/28/2014    Rheumatic fever     thought to have had as a child    Rupture of left Achilles tendon, sequela 12/31/2019    S/P AAA repair using bifurcation graft 11/30/2015    S/P laparoscopic cholecystectomy 12/12/2018    Scoliosis     Sleep difficulties     Small bowel obstruction (H)     Thyroid disease     Thyroid nodule     removed nodules and thyroid    Tinnitus     Created by Conversion  Replacement Utility updated for latest IMO load    Undiagnosed cardiac murmurs 11/22/2006    Vitamin D deficiency 1/15/2013    Weak 4/29/2018    Weakness 5/4/2017    Weakness of both lower  extremities 7/5/2018       PAST SURGICAL HISTORY:  Past Surgical History:   Procedure Laterality Date    AAA REPAIR  2015    wtih bifurcation graft    CARPAL TUNNEL RELEASE RT/LT Bilateral 2013    HC REVISE MEDIAN N/CARPAL TUNNEL SURG      Description: Neuroplasty Decompression Median Nerve At Carpal Tunnel;  Recorded: 01/21/2013;  Comments: bilateral    HYSTERECTOMY  2013    IR ABDOMINAL ENDOVASCULAR STENT GRAFT  11/30/2015    JOINT REPLACEMENT Right 2003    JOINT REPLACEMENT      LAPAROSCOPIC CHOLECYSTECTOMY N/A 12/12/2018    Procedure: LAPAROSCOPIC CHOLECYSTECTOMY;  Surgeon: Amadeo Sebastian MD;  Location: [facility] OR    ORTHOPEDIC SURGERY      FL THYROIDECTOMY      Description: Thyroid Surgery Total Thyroidectomy;  Recorded: 06/08/2011;    SPINE SURGERY  1981    cervical spine fusion    THYROIDECTOMY  2011    XR MAJOR JOINT OR BURSA INJ/ASP BILATERAL  5/18/2020    XR MAJOR JOINT OR BURSA INJ/ASP UNILATERAL  5/18/2020    ZZC CERV SPINE FUSN,ANTER,BELOW C2      Description: Cervical Vertebral Fusion;  Recorded: 01/21/2013;  Comments: 1981    ZZC TOTAL ABDOM HYSTERECTOMY  1985    Description: Hysterectomy;  Recorded: 01/21/2013;    ZZC TOTAL HIP ARTHROPLASTY Right     Description: Total Hip Replacement;  Recorded: 01/21/2013;  Comments: right, 2003       CURRENT MEDICATIONS:    acetaminophen (TYLENOL) 500 MG tablet  bisacodyl (DULCOLAX) 10 MG suppository  carbidopa-levodopa (SINEMET)  MG tablet  diclofenac (VOLTAREN) 1 % topical gel  DULoxetine (CYMBALTA) 60 MG capsule  estradiol (ESTRACE) 0.1 MG/GM vaginal cream  gabapentin (NEURONTIN) 100 MG capsule  gabapentin (NEURONTIN) 300 MG capsule  hydrALAZINE (APRESOLINE) 10 MG tablet  lactobacillus rhamnosus (GG) (CULTURELL) capsule  levothyroxine (SYNTHROID/LEVOTHROID) 100 MCG tablet  lidocaine (LIDODERM) 5 % patch  losartan (COZAAR) 50 MG tablet  naloxone (NARCAN) 4 MG/0.1ML nasal spray  nystatin (MYCOSTATIN) 701234 UNIT/GM external cream  oxyCODONE (ROXICODONE) 5 MG  tablet  oxyCODONE (ROXICODONE) 5 MG tablet  polyethylene glycol (MIRALAX) 17 GM/Dose powder  polyethylene glycol-propylene glycol (SYSTANE ULTRA) 0.4-0.3 % SOLN ophthalmic solution  QUEtiapine (SEROQUEL) 25 MG tablet  senna-docusate (SENOKOT-S/PERICOLACE) 8.6-50 MG tablet  senna-docusate (SENOKOT-S/PERICOLACE) 8.6-50 MG tablet  simethicone (MYLICON) 125 MG chewable tablet  sodium phosphate (FLEET ENEMA) 7-19 GM/118ML rectal enema  vitamin D3 (CHOLECALCIFEROL) 50 mcg (2000 units) tablet        ALLERGIES:  Allergies   Allergen Reactions    Penicillins Anaphylaxis, Rash and Shortness Of Breath    Aspirin Nausea and GI Disturbance    Atenolol Cough    Atorvastatin Muscle Pain (Myalgia) and Nausea and Vomiting    Bupropion Nausea    Cephalexin Rash     Localized to neck area    Clindamycin Other (See Comments)     Constipation     Codeine Nausea    Ibuprofen Nausea and GI Disturbance    Lovastatin Muscle Pain (Myalgia)       FAMILY HISTORY:  Family History   Problem Relation Age of Onset    Hypertension Mother     Coronary Artery Disease Mother     Coronary Artery Disease Father     Other Cancer Brother     Parkinsonism Other     Heart Disease Mother     Heart Disease Father     Cancer Brother 57.00        colon    Hypertension Daughter     Hypertension Daughter     Neuropathy Daughter     Lupus Daughter     Heart Disease Paternal Aunt     Heart Disease Paternal Uncle     Parkinsonism Paternal Uncle        SOCIAL HISTORY:   Social History     Socioeconomic History    Marital status:    Tobacco Use    Smoking status: Former     Packs/day: .5     Types: Cigarettes     Quit date: 1994     Years since quittin.8    Smokeless tobacco: Never   Substance and Sexual Activity    Alcohol use: No    Drug use: No    Sexual activity: Not Currently       VITALS:  /72   Pulse 80   Temp 97.9  F (36.6  C) (Oral)   Resp 18   Wt 64.2 kg (141 lb 9.6 oz)   SpO2 100%   BMI 22.85 kg/m      PHYSICAL EXAM    Physical  Exam  Constitutional: Awake and alert, no acute distress  HENT: Normocephalic, Atraumatic, no scalp hematoma, no hemotympanum or septal hematoma bilaterally, no facial trauma, Bilateral external ears normal, Oropharynx normal, mucous membranes moist, Nose normal. Neck- Normal range of motion, No midline cervical spine tenderness, Supple, No stridor.    Eyes: PERRL, EOMI, Conjunctiva normal, No discharge.   Respiratory: Normal breath sounds, No respiratory distress, No wheezing or crackles, Speaks in full sentences easily.    Cardiovascular: Normal heart rate, Regular rhythm, No murmurs, No rubs, No gallops. 2+ radial pulses bilaterally. No reproducible chest tenderness.    GI: Bowel sounds normal, Soft, Generalized abdominal tenderness, No masses, No rebound or guarding. No CVA tenderness bilaterally.  Musculoskeletal: 2+ DP and PT pulses. Left leg appears somewhat shortened and rotated. Tenderness of the left hip area and reports diffuse tenderness in the lower leg as well, no open wounds or lacerations, compartments are soft and compressible, no tenderness of the right lower extremity, pelvis is stable to compression, Diffuse tenderness to the thoracic and lumbar spine. No tenderness in the upper extremities and normal ROM of upper extremities.   Integument: Warm, Dry, No erythema, No rash. No petechiae.   Neurologic: Alert & oriented to self, at mental baseline per daughter, moves extremities spontaneously and follows commands. Sensation intact to light touch.   Psychiatric: Cooperative.      LAB:  All pertinent labs reviewed and interpreted.  Results for orders placed or performed during the hospital encounter of 10/26/23   Head CT w/o contrast    Impression    IMPRESSION:  1.  No acute intracranial injury, hemorrhage, mass, or CT evidence of recent ischemia.   Cervical spine CT w/o contrast    Impression    IMPRESSION:  1.  No acute cervical spine fracture.   CT Chest/Abdomen/Pelvis w Contrast    Impression     IMPRESSION:  1.  Comminuted, moderately displaced left intertrochanteric femur fracture. No additional acute traumatic abnormality in the chest, abdomen or pelvis with spine reported separately.  2.  Subacute right anterior 2nd - 3rd and left 3rd - 6th rib fractures.   3.  Additional chronic details in the findings.   CT Lumbar Spine Reconstructed    Impression    IMPRESSION:  1.  No acute lumbar spine fracture.  2.  Chronic T12, L3, L4 and L5 endplate deformities.                 CT Thoracic Spine Reconstructed    Impression    IMPRESSION:  1.  No acute thoracic spine fracture.  2.  Chronic T12 fracture.             XR Femur Left 2 Views    Impression    IMPRESSION: Displaced varus angulated intertrochanteric left proximal femur fracture with separate lesser trochanteric avulsive fracture fragment. No dislocation of the left femoral head from the acetabulum. Mild left hip osteoarthritis with diffuse bone   demineralization and partial visualization of a right hip arthroplasty. Chronic healed superior and inferior pubic rami fractures on each side. Partially seen. Vascular stenting in the iliac arteries. Left total knee arthroplasty with patellar   resurfacing. Mid to distal left femur appears intact. Arterial calcification. No appreciable left knee joint effusion.    NOTE: ABNORMAL REPORT    THE DICTATION ABOVE DESCRIBES AN ABNORMALITY FOR WHICH FOLLOW-UP IS NEEDED.    XR Tibia and Fibula Left 2 Views    Impression    IMPRESSION: Anatomic alignment left tibia and fibula. No acute displaced tibia or fibula fracture. Diffuse bone demineralization. Left total knee arthroplasty with patellar resurfacing. No sizable left knee joint effusion. Arterial calcification.   Partially seen fractures at the distal aspects of the left fourth and fifth metatarsals with degenerative change in the left foot.   XR Pelvis 1/2 Views    Impression    IMPRESSION:   New displaced varus angulated left intertrochanteric proximal femur  fracture. Separate lesser trochanteric avulsive fracture fragment. No definitive dislocation of the left femoral head from the acetabulum. Chronic healed fracture deformities at both   superior and inferior pubic rami. Right total hip arthroplasty is partially seen. No acute displaced pelvic fracture is identified. Endovascular stenting redemonstrated abdominal aorta and iliac arteries. Degenerative change lower lumbar spine.    NOTE: ABNORMAL REPORT    THE DICTATION ABOVE DESCRIBES AN ABNORMALITY FOR WHICH FOLLOW-UP IS NEEDED.    Comprehensive metabolic panel   Result Value Ref Range    Sodium 140 135 - 145 mmol/L    Potassium 4.0 3.4 - 5.3 mmol/L    Carbon Dioxide (CO2) 30 (H) 22 - 29 mmol/L    Anion Gap 9 7 - 15 mmol/L    Urea Nitrogen 24.5 (H) 8.0 - 23.0 mg/dL    Creatinine 0.92 0.51 - 0.95 mg/dL    GFR Estimate 61 >60 mL/min/1.73m2    Calcium 8.9 8.8 - 10.2 mg/dL    Chloride 101 98 - 107 mmol/L    Glucose 113 (H) 70 - 99 mg/dL    Alkaline Phosphatase 59 35 - 104 U/L    AST 36 0 - 45 U/L    ALT <5 0 - 50 U/L    Protein Total 6.3 (L) 6.4 - 8.3 g/dL    Albumin 3.8 3.5 - 5.2 g/dL    Bilirubin Total 0.8 <=1.2 mg/dL   UA with Microscopic reflex to Culture    Specimen: Urine, Catheter   Result Value Ref Range    Color Urine Yellow Colorless, Straw, Light Yellow, Yellow    Appearance Urine Clear Clear    Glucose Urine Negative Negative mg/dL    Bilirubin Urine Negative Negative    Ketones Urine Trace (A) Negative mg/dL    Specific Gravity Urine 1.020 1.005 - 1.030    Blood Urine Negative Negative    pH Urine 6.0 5.0 - 7.0    Protein Albumin Urine 20 (A) Negative mg/dL    Urobilinogen Urine 2.0 (A) <2.0 mg/dL    Nitrite Urine Negative Negative    Leukocyte Esterase Urine Negative Negative    Bacteria Urine Few (A) None Seen /HPF    Mucus Urine Present (A) None Seen /LPF    RBC Urine 20 (H) <=2 /HPF    WBC Urine 3 <=5 /HPF    Squamous Epithelials Urine 5 (H) <=1 /HPF   CBC with platelets and differential   Result Value  Ref Range    WBC Count 8.8 4.0 - 11.0 10e3/uL    RBC Count 3.28 (L) 3.80 - 5.20 10e6/uL    Hemoglobin 10.3 (L) 11.7 - 15.7 g/dL    Hematocrit 32.3 (L) 35.0 - 47.0 %    MCV 99 78 - 100 fL    MCH 31.4 26.5 - 33.0 pg    MCHC 31.9 31.5 - 36.5 g/dL    RDW 12.5 10.0 - 15.0 %    Platelet Count 182 150 - 450 10e3/uL    % Neutrophils 84 %    % Lymphocytes 9 %    % Monocytes 6 %    % Eosinophils 0 %    % Basophils 1 %    % Immature Granulocytes 0 %    NRBCs per 100 WBC 0 <1 /100    Absolute Neutrophils 7.3 1.6 - 8.3 10e3/uL    Absolute Lymphocytes 0.8 0.8 - 5.3 10e3/uL    Absolute Monocytes 0.6 0.0 - 1.3 10e3/uL    Absolute Eosinophils 0.0 0.0 - 0.7 10e3/uL    Absolute Basophils 0.0 0.0 - 0.2 10e3/uL    Absolute Immature Granulocytes 0.0 <=0.4 10e3/uL    Absolute NRBCs 0.0 10e3/uL   ECG 12-LEAD WITH MUSE (L[facility])   Result Value Ref Range    Systolic Blood Pressure 149 mmHg    Diastolic Blood Pressure 65 mmHg    Ventricular Rate 79 BPM    Atrial Rate 79 BPM    MD Interval 152 ms    QRS Duration 106 ms     ms    QTc 444 ms    P Axis 84 degrees    R AXIS 29 degrees    T Axis 100 degrees    Interpretation ECG       Sinus rhythm  Possible Left atrial enlargement  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 Please see official radiology report.  XR Tibia and Fibula Left 2 Views   Final Result   IMPRESSION: Anatomic alignment left tibia and fibula. No acute displaced tibia or fibula fracture. Diffuse bone demineralization. Left total knee arthroplasty with patellar resurfacing. No sizable left knee joint effusion. Arterial calcification.    Partially seen fractures at the distal aspects of the left fourth  and fifth metatarsals with degenerative change in the left foot.      XR Femur Left 2 Views   Final Result   IMPRESSION: Displaced varus angulated intertrochanteric left proximal femur fracture with separate lesser trochanteric avulsive fracture fragment. No dislocation of the left femoral head from the acetabulum. Mild left hip osteoarthritis with diffuse bone    demineralization and partial visualization of a right hip arthroplasty. Chronic healed superior and inferior pubic rami fractures on each side. Partially seen. Vascular stenting in the iliac arteries. Left total knee arthroplasty with patellar    resurfacing. Mid to distal left femur appears intact. Arterial calcification. No appreciable left knee joint effusion.      NOTE: ABNORMAL REPORT      THE DICTATION ABOVE DESCRIBES AN ABNORMALITY FOR WHICH FOLLOW-UP IS NEEDED.       XR Pelvis 1/2 Views   Final Result   IMPRESSION:    New displaced varus angulated left intertrochanteric proximal femur fracture. Separate lesser trochanteric avulsive fracture fragment. No definitive dislocation of the left femoral head from the acetabulum. Chronic healed fracture deformities at both    superior and inferior pubic rami. Right total hip arthroplasty is partially seen. No acute displaced pelvic fracture is identified. Endovascular stenting redemonstrated abdominal aorta and iliac arteries. Degenerative change lower lumbar spine.      NOTE: ABNORMAL REPORT      THE DICTATION ABOVE DESCRIBES AN ABNORMALITY FOR WHICH FOLLOW-UP IS NEEDED.       CT Thoracic Spine Reconstructed   Final Result   IMPRESSION:   1.  No acute thoracic spine fracture.   2.  Chronic T12 fracture.                     CT Lumbar Spine Reconstructed   Final Result   IMPRESSION:   1.  No acute lumbar spine fracture.   2.  Chronic T12, L3, L4 and L5 endplate deformities.                         CT Chest/Abdomen/Pelvis w Contrast   Final Result   IMPRESSION:   1.  Comminuted, moderately displaced left  intertrochanteric femur fracture. No additional acute traumatic abnormality in the chest, abdomen or pelvis with spine reported separately.   2.  Subacute right anterior 2nd - 3rd and left 3rd - 6th rib fractures.    3.  Additional chronic details in the findings.      Head CT w/o contrast   Final Result   IMPRESSION:   1.  No acute intracranial injury, hemorrhage, mass, or CT evidence of recent ischemia.      Cervical spine CT w/o contrast   Final Result   IMPRESSION:   1.  No acute cervical spine fracture.      Foot XR, G/E 3 views, left    (Results Pending)       EKG:    Performed at: 26-OCT-2023, 10:00    Impression: Sinus rhythm. Possible Left atrial enlargement. 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 Anteroseptal infarct (cited on or before 25-SEP-2015). Unchanged from prior. No signs of acute ischemia.     Rate: 79 BPM  Rhythm: Sinus rhythm.   Axis: 29  GA Interval: 152 ms  QRS Interval: 106 ms  QTc Interval: 444 ms  Comparison: When compared with ECG of 07-NOV-2022, 21:20, No significant change was found.    I have independently reviewed and interpreted the EKG(s) documented above.    PROCEDURES:   None.      [facility] Documentation:   CMS Diagnoses:               I, Prashanth Sosa, am serving as a scribe to document services personally performed by Kailyn Caldwell MD based on my observation and the provider's statements to me. I, Kailyn Caldwell MD, attest that Prashanth Sosa is acting in a scribe capacity, has observed my performance of the services and has documented them in accordance with my direction.    Kailyn Caldwell MD  [facility] EMERGENCY ROOM  [address] [id number]  [phone]       Kailyn Caldwell MD  11/22/23 0004

## 2023-10-26 NOTE — H&P
[facility] MEDICINE ADMISSION HISTORY AND PHYSICAL     Assessment & Plan       83 year old female from local memory care for recurrent falls.  Pt  Arrived to ER with complaints of pain in her left hip.  Due to her  Chronic dementia all of history is by supporting people including daughter  And memory care.   There have been no reports of syncope.    ER diagnostics included extensive imaging.  (CT head, thoracic spine, lumbar spine, chest, abdomen and pelvis; plain films of her femur, tibia, fibula, pelvis).  Relavent findings include an acute left intertrochanteric proximal  Femur fracture. She has old compression fractures of her T12, L3, L4, L5.  An xray of the left foot is pending.  Lab findings include a hemoglobin of 10.3 along with a white count of 8, BUN of 24 and creatinine of .92.    On my interview in the ER the patient is confused though knows her  First name only.  She smells of urine.  She follows simple commands.  She looks volume depleted.  Per report her skin exam was reassuring.    Pts daughter was here earlier.  She agrees with the medical decision making  And plan including admission; ORIF of the hip along with post op cares.      Problem list:     Left hip fracture/ intertrochanteric proximal femur:  [facility]  Consult (Dr Tesfaye notified by ER); tenative plans for surgery  Tomorrow at 1800; NPO after midnight; pain control  Mechanical falls; recurrent: rehab potential is likely low due to MSA  Multisystem atrophy with early lewy body dementia: continue  Home sinemet per home dosing; expect PT, OT post op  Will discuss with daughter clinical goals (pt follows with  Palliative care in clinic; no consult needed at this time)  HTN: losartan and hydralazine with hold parameters; prn  Hydralazine for AM; hold AM losartan  Compression fractures: lumbar, thoracic with recurrent pain:  Normally on scheduled oxycodone, gabapentin  6.  Anemia: 10.3 on admission; baseline is 13  (11 months ago);   Will check indices  7.  Dvt prevention:  scd        Chief Complaint  falls         Past Medical History     Past Medical History:  No date: AAA (abdominal aortic aneurysm)  12/16/2013: Abdominal pain, epigastric  9/15/2016: Abnormal computed tomography scan  9/15/2016: Abnormal finding on imaging  9/26/2015: Acute encephalopathy  9/17/2018: Adjustment disorder with anxious mood  9/17/2018: Adjustment disorder with mixed anxiety and depressed mood  No date: Adrenal adenoma      Comment:  stable, thought not to be hormonally active  No date: Adrenal adenoma  No date: Agitation  11/18/2010: Anemia  2/25/2008: Aneurysm of abdominal vessel      Comment:  Created by [facility] Annotation: Jun 8 2011  8:07AM - Danni Ortiz: 4.4 on 11/2013.   Needs                6-12 month u/s or CT.  Replacement Utility updated for                latest IMO load  Overview:  2/08 Abd US: 5.4 cm. 3/08 CT                Abd: 2.7 x 3.3. 9/08:  MRI Abd: 2.7X3.2  Next 9/09.  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      Comment:  Created by [facility] Annotation: Feb  3                2012  8:Cameron Elam: Needs EGD 2/2017                  Overview:  Overview:  Created by [facility]                Annotation: Feb  3 2012  8:32Cameron Tejada: Needs                EGD 2/2017 9/19/2016: Benign neoplasm of ascending colon  8/5/2020: Bilateral knee pain  5/5/2017: Bloating  9/17/2018: Bradycardia  9/17/2018: Cataract      Comment:  Created by Conversion   Overview:  Overview:  Created by               Conversion  1/20/2013: Chest wall pain  9/29/2016: Chronic low back pain  No date: Cognitive and behavioral changes  10/10/2020:  Compression fracture of lumbar vertebra (H)  No date: Constipation  11/22/2006: Contact dermatitis and eczema  2/26/2008: DDD (degenerative disc disease), lumbosacral  No date: Depression  7/13/2009: Depression, major  No date: Depressive disorder  9/18/2018: Diaphragmatic hernia      Comment:  Created by Conversion  Replacement Utility updated for                latest IMO load  Overview:  Overview:  Created by                Conversion  Replacement Utility updated for latest IMO                load  9/15/2016: Dietary counseling and surveillance  9/17/2018: Disorder of bone and cartilage      Comment:  Created by Conversion [facility] Annotation: Dec 13                2012  7:Roberta Padron: vit D/Ca, f/u  Dexa                needed 1/2014.  Replacement Utility updated for latest                IMO load  Overview:  Overview:  Created by [facility] Annotation: Dec 13 2012  7:Roberta Padron: vit D/Ca, f/u  Dexa needed 1/2014.  Replacement                Utility updated for latest IMO load  12/18/2014: Diverticulosis of colon  No date: Dizziness and giddiness      Comment:  Created by Conversion   12/17/2013: Dysphagia  12/16/2013: Early satiety  No date: Esophageal reflux      Comment:  Created by Conversion   11/22/2006: Essential hypertension      Comment:  Created by Conversion  Replacement Utility updated for                latest IMO load  Overview:  Overview:  Created by                Conversion  Replacement Utility updated for latest IMO                load  8/5/2020: Fall  2/19/2014: Flatulence, eructation and gas pain  9/19/2016: Gaseous abdominal distention  6/6/2017: Gastro-esophageal reflux disease with esophagitis  7/4/2018: Generalized muscle weakness  No date: GERD (gastroesophageal reflux disease)  9/19/2016: Hemorrhage of rectum and anus  No date: Hiatal hernia  No date: HLD (hyperlipidemia)  4/6/2010: Hoarseness of voice  No date: HTN  (hypertension)  No date: Hypertension  6/29/2007: Hypokalemia  No date: Hypothyroid  9/22/2009: Hypothyroidism      Comment:  Created by Conversion  Replacement Utility updated for                latest IMO load  Overview:  Overview:  Created by                Conversion  Replacement Utility updated for latest IMO                load  9/17/2018: Insomnia due to anxiety and fear  No date: Insomnia due to mental condition  11/22/2006: Irritable bowel syndrome  No date: Lower back pain  9/17/2018: Major depressive disorder, recurrent episode (H)      Comment:  Created by Conversion  Replacement Utility updated for                latest IMO load  Overview:  Overview:  Created by                Conversion  Replacement Utility updated for latest IMO                load  No date: Major depressive disorder, single episode, moderate (H)  7/10/2007: Malaise and fatigue  No date: Metabolic encephalopathy  No date: Migraine with aura  2/10/2008: Mixed hyperlipidemia      Comment:  Created by Conversion   No date: Mood disorder due to a general medical condition  9/19/2018: Movement disorder  No date: Multiple system atrophy P (H)  11/14/2018: Multiple system atrophy P (H)  No date: Murmur  10/23/2020: Nonrheumatic aortic valve stenosis  11/22/2006: Nontoxic multinodular goiter      Comment:  Overview:  thyroidectomy 7/07 for atypical cells on FNA-               benign cysts seen at time of surgical pathology  No date: Obesity, unspecified      Comment:  Created by Conversion   9/22/2017: Orthostatic hypotension dysautonomic syndrome  No date: Osteoarthritis  No date: Partial small bowel obstruction (H)  9/19/2016: Polyp of colon  12/12/2018: Post-op pain  1/15/2013: Postoperative hypothyroidism      Comment:  Overview:  7/2/07-  Thyroidectomy by Dr Wang Crenshaw  10/26/2010: Prediabetes  7/4/2017: Primary insomnia  8/5/2020: REM sleep behavior disorder  4/28/2014: Retinal migraine  No date: Rheumatic fever      Comment:  thought  to have had as a child  12/31/2019: Rupture of left Achilles tendon, sequela  11/30/2015: S/P AAA repair using bifurcation graft  12/12/2018: S/P laparoscopic cholecystectomy  No date: Scoliosis  No date: Sleep difficulties  No date: Small bowel obstruction (H)  No date: Thyroid disease  No date: Thyroid nodule      Comment:  removed nodules and thyroid  No date: Tinnitus      Comment:  Created by Conversion  Replacement Utility updated for                latest IMO load  11/22/2006: Undiagnosed cardiac murmurs  1/15/2013: Vitamin D deficiency  4/29/2018: Weak  5/4/2017: Weakness  7/5/2018: Weakness of both lower extremities     Surgical History     Past Surgical History:   Procedure Laterality Date    AAA REPAIR  2015    wtih bifurcation graft    CARPAL TUNNEL RELEASE RT/LT Bilateral 2013    HC REVISE MEDIAN N/CARPAL TUNNEL SURG      Description: Neuroplasty Decompression Median Nerve At Carpal Tunnel;  Recorded: 01/21/2013;  Comments: bilateral    HYSTERECTOMY  2013    IR ABDOMINAL ENDOVASCULAR STENT GRAFT  11/30/2015    JOINT REPLACEMENT Right 2003    JOINT REPLACEMENT      LAPAROSCOPIC CHOLECYSTECTOMY N/A 12/12/2018    Procedure: LAPAROSCOPIC CHOLECYSTECTOMY;  Surgeon: Amadeo Sebastian MD;  Location: [facility] OR    ORTHOPEDIC SURGERY      DC THYROIDECTOMY      Description: Thyroid Surgery Total Thyroidectomy;  Recorded: 06/08/2011;    SPINE SURGERY  1981    cervical spine fusion    THYROIDECTOMY  2011    XR MAJOR JOINT OR BURSA INJ/ASP BILATERAL  5/18/2020    XR MAJOR JOINT OR BURSA INJ/ASP UNILATERAL  5/18/2020    Z CERV SPINE FUSN,ANTER,BELOW C2      Description: Cervical Vertebral Fusion;  Recorded: 01/21/2013;  Comments: 1981    [facility] TOTAL ABDOM HYSTERECTOMY  1985    Description: Hysterectomy;  Recorded: 01/21/2013;    [facility] TOTAL HIP ARTHROPLASTY Right     Description: Total Hip Replacement;  Recorded: 01/21/2013;  Comments: right, 2003     Family History    Reviewed, and   Family History   Problem Relation Age of  Onset    Hypertension Mother     Coronary Artery Disease Mother     Coronary Artery Disease Father     Other Cancer Brother     Parkinsonism Other     Heart Disease Mother     Heart Disease Father     Cancer Brother 57.00        colon    Hypertension Daughter     Hypertension Daughter     Neuropathy Daughter     Lupus Daughter     Heart Disease Paternal Aunt     Heart Disease Paternal Uncle     Parkinsonism Paternal Uncle         Social History      Social History     Tobacco Use    Smoking status: Former     Packs/day: .5     Types: Cigarettes     Quit date: 1994     Years since quittin.8    Smokeless tobacco: Never   Substance Use Topics    Alcohol use: No    Drug use: No        Allergies     Allergies   Allergen Reactions    Penicillins Anaphylaxis, Rash and Shortness Of Breath    Aspirin Nausea and GI Disturbance    Atenolol Cough    Atorvastatin Muscle Pain (Myalgia) and Nausea and Vomiting    Bupropion Nausea    Cephalexin Rash     Localized to neck area    Clindamycin Other (See Comments)     Constipation     Codeine Nausea    Ibuprofen Nausea and GI Disturbance    Lovastatin Muscle Pain (Myalgia)     Prior to Admission Medications      Prior to Admission Medications   Prescriptions Last Dose Informant Patient Reported? Taking?   DULoxetine (CYMBALTA) 60 MG capsule   Yes No   Sig: Take 60 mg by mouth daily   QUEtiapine (SEROQUEL) 25 MG tablet   No No   Sig: Take 1 tablet (25 mg) by mouth At Bedtime   acetaminophen (TYLENOL) 500 MG tablet   No No   Sig: Take 2 tablets (1,000 mg) by mouth 3 times daily   bisacodyl (DULCOLAX) 10 MG suppository   Yes No   Sig: Place 10 mg rectally daily as needed for constipation   carbidopa-levodopa (SINEMET)  MG tablet   No No   Sig: Take carbidopa/levodopa IR 25/100 mg 1 tab at 7 am, 1 pm and 8 pm = 3 tabs daily   diclofenac (VOLTAREN) 1 % topical gel   Yes No   Sig: Apply 2 g topically 3 times daily as needed for moderate pain   estradiol (ESTRACE) 0.1 MG/GM  vaginal cream   Yes No   Sig: estradiol 0.01% (0.1 mg/gram) vaginal cream   0.5 gm 2x week around urethra opening   gabapentin (NEURONTIN) 100 MG capsule   Yes No   Sig: Take 100 mg by mouth 2 times daily   gabapentin (NEURONTIN) 300 MG capsule   Yes No   Sig: Take 300 mg by mouth At Bedtime   hydrALAZINE (APRESOLINE) 10 MG tablet   Yes No   Sig: Take 10 mg by mouth 3 times daily   lactobacillus rhamnosus (GG) (CULTURELL) capsule   Yes No   Sig: Take 1 capsule by mouth daily   levothyroxine (SYNTHROID/LEVOTHROID) 100 MCG tablet   No No   Sig: Take 1 tablet (100 mcg) by mouth daily   lidocaine (LIDODERM) 5 % patch   Yes No   Sig: Place 1 patch onto the skin every 24 hours To prevent lidocaine toxicity, patient should be patch free for 12 hrs daily.   losartan (COZAAR) 50 MG tablet   Yes No   Sig: Take 50 mg by mouth 2 times daily   naloxone (NARCAN) 4 MG/0.1ML nasal spray   Yes No   Sig: Spray 4 mg into one nostril alternating nostrils as needed for opioid reversal every 2-3 minutes until assistance arrives   nystatin (MYCOSTATIN) 578282 UNIT/GM external cream   Yes No   Sig: Apply topically 2 times daily APPLY TOPICALLY TO AREA UNDER  right BREAST   oxyCODONE (ROXICODONE) 5 MG tablet   Yes No   Sig: Take 2.5 mg by mouth every 6 hours as needed for severe pain   oxyCODONE (ROXICODONE) 5 MG tablet   No No   Sig: Take 1 tablet (5 mg) by mouth 3 times daily   polyethylene glycol (MIRALAX) 17 GM/Dose powder   No No   Sig: Take 17 g by mouth 2 times daily   polyethylene glycol-propylene glycol (SYSTANE ULTRA) 0.4-0.3 % SOLN ophthalmic solution   Yes No   Sig: Place 2 drops into both eyes every hour as needed for dry eyes   senna-docusate (SENOKOT-S/PERICOLACE) 8.6-50 MG tablet   Yes No   Sig: Take 1 tablet by mouth daily   senna-docusate (SENOKOT-S/PERICOLACE) 8.6-50 MG tablet   Yes No   Sig: Take 1 tablet by mouth 2 times daily as needed for constipation   simethicone (MYLICON) 125 MG chewable tablet   Yes No   Sig: Take  125 mg by mouth 2 times daily   sodium phosphate (FLEET ENEMA) 7-19 GM/118ML rectal enema   Yes No   Sig: Place 1 enema rectally once as needed for constipation   vitamin D3 (CHOLECALCIFEROL) 50 mcg (2000 units) tablet   No No   Sig: Take 1 tablet (50 mcg) by mouth daily      Facility-Administered Medications: None      Review of Systems     A 12 point comprehensive review of systems was negative except as noted above in HPI.    PHYSICAL EXAMINATION       Vitals      Temp:  [97.9  F (36.6  C)] 97.9  F (36.6  C)  Pulse:  [71-92] 85  Resp:  [14-25] 14  BP: ()/(55-94) 158/89  SpO2:  [91 %-99 %] 98 %    Examination     GENERAL:  Awake, alert, only knows name; chronically ill   HEAD:  Normocephalic, without obvious abnormality, atraumatic   EYES:  PERRL, conjunctiva/corneas clear, no scleral icterus, EOM's intact   NOSE: Nares normal, septum midline, mucosa normal, no drainage   THROAT: Dry mouth;    NECK: Supple, symmetrical, trachea midline   BACK:   Symmetric, no curvature, ROM normal   LUNGS:   Clear to auscultation bilaterally, no rales, rhonchi, or wheezing, symmetric chest rise on inhalation, respirations unlabored   CHEST WALL:  No tenderness or deformity   HEART:  Regular rate and rhythm, S1 and S2 normal, no murmur, rub, or gallop    ABDOMEN:   Soft, non-tender, bowel sounds active all four quadrants, no masses, no organomegaly, no rebound or guarding   EXTREMITIES: Extremities normal, atraumatic, no cyanosis or edema    SKIN: Dry to touch, no exanthems in the visualized areas   NEURO: Alert, oriented x 4, moves all four extremities freely, non-focal   PSYCH: Cooperative, behavior is appropriate      Pertinent Lab   Most Recent 3 BMP's:  Recent Labs   Lab Test 10/26/23  1022 11/07/22  1805 05/22/22  0656    141 144   POTASSIUM 4.0 4.6 4.0   CHLORIDE 101 101 110*   CO2 30* 30 26   BUN 24.5* 21 20   CR 0.92 0.77 0.70   ANIONGAP 9 10 8   YADIRA 8.9 9.1 8.8   * 99 88         Pertinent Radiology      Radiology Results:   Recent Results (from the past 24 hour(s))   Cervical spine CT w/o contrast    Narrative    EXAM: CT CERVICAL SPINE W/O CONTRAST  LOCATION: [facility]  DATE: 10/26/2023    INDICATION: fall, neck pain  COMPARISON: Cervical spine MRI 07/05/2018  TECHNIQUE: Routine CT Cervical Spine without IV contrast. Multiplanar reformats. Dose reduction techniques were used.    FINDINGS:  Mild reversal of the usual cervical lordosis. C5-C7 fusion. Slight anterolisthesis of C7 on T1. Cervical vertebral body heights preserved.  No acute cervical spine fracture. No prevertebral soft tissue swelling. Multilevel degenerative changes without   evidence for high-grade spinal canal narrowing.      Impression    IMPRESSION:  1.  No acute cervical spine fracture.   Head CT w/o contrast    Narrative    EXAM: CT HEAD W/O CONTRAST  LOCATION: [facility]  DATE: 10/26/2023    INDICATION: fall, head injury  COMPARISON: None.  TECHNIQUE: Routine CT Head without IV contrast. Multiplanar reformats. Dose reduction techniques were used.    FINDINGS:  INTRACRANIAL CONTENTS: No intracranial hemorrhage, extraaxial collection, or mass effect.  No CT evidence of acute infarct. Mild presumed chronic small vessel ischemic changes. Mild generalized volume loss. No hydrocephalus.     VISUALIZED ORBITS/SINUSES/MASTOIDS: No intraorbital abnormality. No paranasal sinus mucosal disease. No middle ear or mastoid effusion.    BONES/SOFT TISSUES: No acute abnormality.      Impression    IMPRESSION:  1.  No acute intracranial injury, hemorrhage, mass, or CT evidence of recent ischemia.   CT Chest/Abdomen/Pelvis w Contrast    Narrative    EXAM: CT CHEST/ABDOMEN/PELVIS W CONTRAST  LOCATION: [facility]  DATE: 10/26/2023    INDICATION: fall, dementia, abdominal pain, left hip pain, eval for injury  COMPARISON: CT chest, abdomen and pelvis 11/07/2022  TECHNIQUE: CT scan of  the chest, abdomen, and pelvis was performed following injection of IV contrast. Multiplanar reformats were obtained. Dose reduction techniques were used.   CONTRAST: Isovue 370 90ml    FINDINGS:   LUNGS AND PLEURA: Scarring in the lingula and lung bases bilaterally. No areas of consolidation. No actionable pulmonary nodules. No pleural effusion or pneumothorax.    MEDIASTINUM/AXILLAE: Heart size is normal. Mitral annular calcifications. No pericardial effusion. The thoracic aorta is nonaneurysmal with severe calcified and noncalcified plaquing but no acute abnormality. No thoracic lymphadenopathy. Postsurgical   changes in the right axilla. Thyroidectomy.    CORONARY ARTERY CALCIFICATION: Moderate.    HEPATOBILIARY: Normal liver contours. Cholecystectomy. Prominence of the biliary tree is unchanged and likely reservoir effect.    PANCREAS: Unremarkable.    SPLEEN: Unremarkable.    ADRENAL GLANDS: Unchanged benign left adrenal adenoma (which requires no follow-up). Normal right adrenal gland.    KIDNEYS/BLADDER: Unchanged renal morphology with normal enhancement. No solid mass or obstructive uropathy. Bladder is obscured by streak artifact.    BOWEL: Small rectal stool ball without wall thickening or perirectal stranding. No bowel obstruction. No free fluid or free air.    LYMPH NODES: No lymphadenopathy.    VASCULATURE: Postoperative findings from infrarenal abdominal aortic repair with aortobiiliac stent.     PELVIC ORGANS: Uterus is surgically absent.    MUSCULOSKELETAL: Comminuted and moderately displaced left intertrochanteric femur fracture. Subacute right anterior 2nd - 3rd and left 3rd - 6th rib fractures. Healed right posterior ninth rib fracture as well as healed bilateral superior and inferior   pubic rami fractures. Partially visualized right hip prosthesis. Spine reported separately.      Impression    IMPRESSION:  1.  Comminuted, moderately displaced left intertrochanteric femur fracture. No additional  acute traumatic abnormality in the chest, abdomen or pelvis with spine reported separately.  2.  Subacute right anterior 2nd - 3rd and left 3rd - 6th rib fractures.   3.  Additional chronic details in the findings.   CT Lumbar Spine Reconstructed    Narrative    EXAM: CT LUMBAR SPINE RECONSTRUCTED  LOCATION: [facility]  DATE: 10/26/2023    INDICATION: Fall, back pain.  COMPARISON: Lumbar spine MRI 11/22/2022, thoracic spine MRI 11/08/2022.  TECHNIQUE: Dedicated axial, sagittal, and coronal images of the Lumbar Spine were generated utilizing CT abdomen pelvis source data and are separately reviewed. Dose reduction techniques were used.     FINDINGS:  5 lumbar vertebral bodies. Broad convex left lumbar curvature. Leftward subluxation of L3 on L4. T12, L3, L4 and L5 chronic endplate deformities are unchanged. No acute fracture. Degenerative changes without high-grade central spinal canal stenosis.   Severe right-sided foraminal narrowing at L3-L4. Less pronounced foraminal narrowing elsewhere.      Impression    IMPRESSION:  1.  No acute lumbar spine fracture.  2.  Chronic T12, L3, L4 and L5 endplate deformities.                 CT Thoracic Spine Reconstructed    Narrative    EXAM: CT THORACIC SPINE RECONSTRUCTED  LOCATION: [facility]  DATE: 10/26/2023    INDICATION: Fall, back pain.  COMPARISON: Thoracic spine MRI 11/08/2022.  TECHNIQUE: Dedicated axial, sagittal, and coronal images of the Thoracic Spine were generated utilizing CT chest source data and are separately reviewed. Dose reduction techniques were used.     FINDINGS:  Moderate broad convex right thoracic curvature. Chronic T12 compression fracture deformity. Degenerative changes without high-grade central spinal canal stenosis. No acute fracture.      Impression    IMPRESSION:  1.  No acute thoracic spine fracture.  2.  Chronic T12 fracture.             XR Pelvis 1/2 Views    Narrative    EXAM: XR PELVIS  1/2 VIEWS  LOCATION: [facility]  DATE: 10/26/2023    INDICATION: Fall. Left hip pain.  COMPARISON: 06/29/2023.      Impression    IMPRESSION:   New displaced varus angulated left intertrochanteric proximal femur fracture. Separate lesser trochanteric avulsive fracture fragment. No definitive dislocation of the left femoral head from the acetabulum. Chronic healed fracture deformities at both   superior and inferior pubic rami. Right total hip arthroplasty is partially seen. No acute displaced pelvic fracture is identified. Endovascular stenting redemonstrated abdominal aorta and iliac arteries. Degenerative change lower lumbar spine.    NOTE: ABNORMAL REPORT    THE DICTATION ABOVE DESCRIBES AN ABNORMALITY FOR WHICH FOLLOW-UP IS NEEDED.    XR Femur Left 2 Views    Narrative    EXAM: XR FEMUR LEFT 2 VIEWS  LOCATION: [facility]  DATE: 10/26/2023    INDICATION: Left hip pain after fall, leg is shortened.  COMPARISON: Pelvis and left hip radiographic exam 06/29/2023.      Impression    IMPRESSION: Displaced varus angulated intertrochanteric left proximal femur fracture with separate lesser trochanteric avulsive fracture fragment. No dislocation of the left femoral head from the acetabulum. Mild left hip osteoarthritis with diffuse bone   demineralization and partial visualization of a right hip arthroplasty. Chronic healed superior and inferior pubic rami fractures on each side. Partially seen. Vascular stenting in the iliac arteries. Left total knee arthroplasty with patellar   resurfacing. Mid to distal left femur appears intact. Arterial calcification. No appreciable left knee joint effusion.    NOTE: ABNORMAL REPORT    THE DICTATION ABOVE DESCRIBES AN ABNORMALITY FOR WHICH FOLLOW-UP IS NEEDED.    XR Tibia and Fibula Left 2 Views    Narrative    EXAM: XR TIBIA AND FIBULA LEFT 2 VIEWS  LOCATION: [facility]  DATE: 10/26/2023    INDICATION:  Fall, pain.  COMPARISON: None.      Impression    IMPRESSION: Anatomic alignment left tibia and fibula. No acute displaced tibia or fibula fracture. Diffuse bone demineralization. Left total knee arthroplasty with patellar resurfacing. No sizable left knee joint effusion. Arterial calcification.   Partially seen fractures at the distal aspects of the left fourth and fifth metatarsals with degenerative change in the left foot.         Advance Care Planning        Romana Daley MD  [facility]   Phone: #[phone]

## 2023-10-26 NOTE — PHARMACY-ADMISSION MEDICATION HISTORY
Pharmacist Admission Medication History    Admission medication history is complete. The information provided in this note is only as accurate as the sources available at the time of the update.    Information Source(s): Facility (U/NH/) medication list/MAR and CareEverywhere/SureScripts via phone and N/A    Pertinent Information: Patient's care facility was able to provide a medication list at admission to hospital however with no confirmation of administration times today 10/26/23. An attempt was made to call the nursing home ([facility]) but no RN was available to send over a MAR at time of calling. Patients medication reconcile was done using Surescripts and medication list with posted times with assumption of given medications if scheduled before time of admission today.    Changes made to PTA medication list:  Added: None  Deleted: None  Changed: None    Medication Affordability:       Allergies reviewed with patient and updates made in EHR: yes    Medication History Completed By: Kayden Yuan [facility] 10/26/2023 3:09 PM    PTA Med List   Medication Sig Last Dose    acetaminophen (TYLENOL) 500 MG tablet Take 2 tablets (1,000 mg) by mouth 3 times daily 10/25/2023 at 2000    bisacodyl (DULCOLAX) 10 MG suppository Place 10 mg rectally daily as needed for constipation PRN    carbidopa-levodopa (SINEMET)  MG tablet Take carbidopa/levodopa IR 25/100 mg 1 tab at 7 am, 1 pm and 8 pm = 3 tabs daily 10/26/2023 at 0800    diclofenac (VOLTAREN) 1 % topical gel Apply 2 g topically 3 times daily 10/26/2023 at am    DULoxetine (CYMBALTA) 60 MG capsule Take 60 mg by mouth daily 10/26/2023 at 0700    estradiol (ESTRACE) 0.1 MG/GM vaginal cream twice a week Mon and Thurs 10/26/2023 at 0700    gabapentin (NEURONTIN) 100 MG capsule Take 100 mg by mouth 2 times daily Takes at 0700 and 1345 10/26/2023 at 0700    gabapentin (NEURONTIN) 300 MG capsule Take 300 mg by mouth At Bedtime 10/25/2023 at 2000    hydrALAZINE  (APRESOLINE) 10 MG tablet Take 10 mg by mouth 3 times daily 10/26/2023 at 0700    levothyroxine (SYNTHROID/LEVOTHROID) 100 MCG tablet Take 1 tablet (100 mcg) by mouth daily 10/26/2023 at 0700    lidocaine (LIDODERM) 5 % patch Place 1 patch onto the skin every 24 hours To prevent lidocaine toxicity, patient should be patch free for 12 hrs daily. 10/26/2023 at 0700    losartan (COZAAR) 50 MG tablet Take 50 mg by mouth 2 times daily 10/26/2023 at 0700    nystatin (MYCOSTATIN) 686618 UNIT/GM external cream Apply topically 2 times daily APPLY TOPICALLY TO AREA UNDER  right BREAST 10/26/2023 at 0700    oxyCODONE (ROXICODONE) 5 MG tablet Take 1 tablet (5 mg) by mouth 3 times daily 10/26/2023 at 0700    oxyCODONE (ROXICODONE) 5 MG tablet Take 2.5 mg by mouth every 6 hours as needed for severe pain PRN    polyethylene glycol (MIRALAX) 17 GM/Dose powder Take 17 g by mouth 2 times daily 10/26/2023 at 0700    polyethylene glycol-propylene glycol (SYSTANE ULTRA) 0.4-0.3 % SOLN ophthalmic solution Place 2 drops into both eyes every hour as needed for dry eyes PRN    QUEtiapine (SEROQUEL) 25 MG tablet Take 1 tablet (25 mg) by mouth At Bedtime 10/25/2023 at 2000    senna-docusate (SENOKOT-S/PERICOLACE) 8.6-50 MG tablet Take 1 tablet by mouth daily 10/26/2023 at 0700    senna-docusate (SENOKOT-S/PERICOLACE) 8.6-50 MG tablet Take 1 tablet by mouth 2 times daily as needed for constipation PRN    sodium phosphate (FLEET ENEMA) 7-19 GM/118ML rectal enema Place 1 enema rectally once as needed for constipation PRN    vitamin D3 (CHOLECALCIFEROL) 50 mcg (2000 units) tablet Take 1 tablet (50 mcg) by mouth daily 10/26/2023 at 0700

## 2023-10-26 NOTE — CONSULTS
ORTHOPEDIC CONSULTATION    Consultation  Mehreen Camara,  1940, MRN [id number]    [facility]  No admission diagnoses are documented for this encounter.    PCP: Dede Arriola, [phone]   Code status:  Prior       Extended Emergency Contact Information  Primary Emergency Contact: Eladio Dowd  Address: [address] [place] [address] [place]  Mobile Phone: [phone]  Relation: Daughter  Secondary Emergency Contact: Anthony Camara  Address: [address] [place] [address] [facility]  Mobile Phone: [phone]  Relation: Son         IMPRESSION:  Left intertrochanteric femur fracture following mechanical fall     PLAN:  This patient was discussed with Dr. Tesfaye, on-call surgeon for [facility] and they are in agreement with the following plan.     - NPO at midnight for surgery tomorrow at 6pm  - pain control  - Bed rest until surgery  - pain management consult  - admit to medicine    Thank you for including [facility] in the care of Mehreen Camara. It has been a pleasure participating in Mehreen's care.        CHIEF COMPLAINT: <principal problem not specified>    HISTORY OF PRESENT ILLNESS:  The patient is seen in orthopedic consultation at the request of Dr. Caldwell.  The patient is a 83 year old female with moderate pain of the left  hip. Per ED Nurse, the patient has frequent falls, so frequent that she regularly receives XR imaging at her memory care living facility, and the patient last fell yesterday at approximately 11:00 AM. Was being provided Percocet PRN for pains secondary to this recent fall (last given this morning). Patient does not take any blood thinners. EMS report VSS, , HR 70, 94% RA.      Patient reports that yesterday she was walking when her legs suddenly gave out causing for her to go up in the air and then fall landing on her left hip and hitting her head. She denies any known loss of  consciousness. She endorses developing neck pain, abdominal pain (mild), left hip pain, back pain, and left leg pain secondary to the fall. She denies any recent chest pain, or any other complications at this time.     PAST MEDICAL HISTORY:  Past Medical History:   Diagnosis Date    AAA (abdominal aortic aneurysm)     Abdominal pain, epigastric 12/16/2013    Abnormal computed tomography scan 9/15/2016    Abnormal finding on imaging 9/15/2016    Acute encephalopathy 9/26/2015    Adjustment disorder with anxious mood 9/17/2018    Adjustment disorder with mixed anxiety and depressed mood 9/17/2018    Adrenal adenoma     stable, thought not to be hormonally active    Adrenal adenoma     Agitation     Anemia 11/18/2010    Aneurysm of abdominal vessel 2/25/2008    Created by [employer] [address] [facility] Annotation: Jun 8 2011  8:07AM - Danni Ortiz: 4.4 on 11/2013.   Needs 6-12 month u/s or CT.  Replacement Utility updated for latest IMO load  Overview:  2/08 Abd US: 5.4 cm. 3/08 CT Abd: 2.7 x 3.3. 9/08:  MRI Abd: 2.7X3.2  Next 9/09.    Anxiety state 2/26/2008    Arthritis     Asymptomatic postmenopausal status     Created by Conversion  Replacement Utility updated for latest IMO load    Back pain 5/16/2020    Harris esophagus     Harris's esophagus 1/11/2012    Created by [employer] [address] [facility] Annotation: Feb  3 2012  8:32Cameron Tejada: Needs EGD 2/2017   Overview:  Overview:  Created by [employer] [facility] [address] [facility] Annotation: Feb  3 2012  8:32Cameron Tejada: Needs EGD 2/2017    Benign neoplasm of ascending colon 9/19/2016    Bilateral knee pain 8/5/2020    Bloating 5/5/2017    Bradycardia 9/17/2018    Cataract 9/17/2018    Created by Conversion   Overview:  Overview:  Created by Conversion    Chest wall pain 1/20/2013    Chronic low back pain 9/29/2016    Cognitive and behavioral changes     Compression fracture of lumbar vertebra (H) 10/10/2020    Constipation     Contact dermatitis and eczema 11/22/2006    DDD  (degenerative disc disease), lumbosacral 2/26/2008    Depression     Depression, major 7/13/2009    Depressive disorder     Diaphragmatic hernia 9/18/2018    Created by Conversion  Replacement Utility updated for latest IMO load  Overview:  Overview:  Created by Conversion  Replacement Utility updated for latest IMO load    Dietary counseling and surveillance 9/15/2016    Disorder of bone and cartilage 9/17/2018    Created by Conversion [email or web address] [facility] Annotation: Dec 13 2012  7:Roberta Padron: vit D/Ca, f/u  Dexa needed 1/2014.  Replacement Utility updated for latest IMO load  Overview:  Overview:  Created by Conversion [email or web address] [facility] Annotation: Dec 13 2012  7:Roberta Padron: vit D/Ca, f/u  Dexa needed 1/2014.  Replacement Utility updated for latest IMO load    Diverticulosis of colon 12/18/2014    Dizziness and giddiness     Created by Conversion     Dysphagia 12/17/2013    Early satiety 12/16/2013    Esophageal reflux     Created by Conversion     Essential hypertension 11/22/2006    Created by Conversion  Replacement Utility updated for latest IMO load  Overview:  Overview:  Created by Conversion  Replacement Utility updated for latest IMO load    Fall 8/5/2020    Flatulence, eructation and gas pain 2/19/2014    Gaseous abdominal distention 9/19/2016    Gastro-esophageal reflux disease with esophagitis 6/6/2017    Generalized muscle weakness 7/4/2018    GERD (gastroesophageal reflux disease)     Hemorrhage of rectum and anus 9/19/2016    Hiatal hernia     HLD (hyperlipidemia)     Hoarseness of voice 4/6/2010    HTN (hypertension)     Hypertension     Hypokalemia 6/29/2007    Hypothyroid     Hypothyroidism 9/22/2009    Created by Conversion  Replacement Utility updated for latest IMO load  Overview:  Overview:  Created by Conversion  Replacement Utility updated for latest IMO load    Insomnia due to anxiety and fear 9/17/2018    Insomnia due to mental condition     Irritable bowel syndrome 11/22/2006     Lower back pain     Major depressive disorder, recurrent episode (H) 9/17/2018    Created by Conversion  Replacement Utility updated for latest IMO load  Overview:  Overview:  Created by Conversion  Replacement Utility updated for latest IMO load    Major depressive disorder, single episode, moderate (H)     Malaise and fatigue 7/10/2007    Metabolic encephalopathy     Migraine with aura     Mixed hyperlipidemia 2/10/2008    Created by Conversion     Mood disorder due to a general medical condition     Movement disorder 9/19/2018    Multiple system atrophy P (H)     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    Murmur     Nonrheumatic aortic valve stenosis 10/23/2020    Nontoxic multinodular goiter 11/22/2006    Overview:  thyroidectomy 7/07 for atypical cells on FNA- benign cysts seen at time of surgical pathology    Obesity, unspecified     Created by Conversion     Orthostatic hypotension dysautonomic syndrome 9/22/2017    Osteoarthritis     Partial small bowel obstruction (H)     Polyp of colon 9/19/2016    Post-op pain 12/12/2018    Postoperative hypothyroidism 1/15/2013    Overview:  7/2/07-  Thyroidectomy by Dr Wang Crenshaw    Prediabetes 10/26/2010    Primary insomnia 7/4/2017    REM sleep behavior disorder 8/5/2020    Retinal migraine 4/28/2014    Rheumatic fever     thought to have had as a child    Rupture of left Achilles tendon, sequela 12/31/2019    S/P AAA repair using bifurcation graft 11/30/2015    S/P laparoscopic cholecystectomy 12/12/2018    Scoliosis     Sleep difficulties     Small bowel obstruction (H)     Thyroid disease     Thyroid nodule     removed nodules and thyroid    Tinnitus     Created by Conversion  Replacement Utility updated for latest IMO load    Undiagnosed cardiac murmurs 11/22/2006    Vitamin D deficiency 1/15/2013    Weak 4/29/2018    Weakness 5/4/2017    Weakness of both lower extremities 7/5/2018          ALLERGIES:   Review of patient's allergies indicates   Allergies    Allergen Reactions    Penicillins Anaphylaxis, Rash and Shortness Of Breath    Aspirin Nausea and GI Disturbance    Atenolol Cough    Atorvastatin Muscle Pain (Myalgia) and Nausea and Vomiting    Bupropion Nausea    Cephalexin Rash     Localized to neck area    Clindamycin Other (See Comments)     Constipation     Codeine Nausea    Ibuprofen Nausea and GI Disturbance    Lovastatin Muscle Pain (Myalgia)         MEDICATIONS UPON ADMISSION:  Medications were reviewed.  They include:   (Not in a hospital admission)        SOCIAL HISTORY:   she  reports that she quit smoking about 28 years ago. Her smoking use included cigarettes. She smoked an average of .5 packs per day. She has never used smokeless tobacco. She reports that she does not drink alcohol and does not use drugs.    FAMILY HISTORY:  family history includes Cancer (age of onset: 57.00) in her brother; Coronary Artery Disease in her father and mother; Heart Disease in her father, mother, paternal aunt, and paternal uncle; Hypertension in her daughter, daughter, and mother; Lupus in her daughter; Neuropathy in her daughter; Other Cancer in her brother; Parkinsonism in her paternal uncle and another family member.      REVIEW OF SYSTEMS:   Reviewed with patient. See HPI, otherwise negative       PHYSICAL EXAMINATION:  Vitals: Temp:  [97.9  F (36.6  C)] 97.9  F (36.6  C)  Pulse:  [71-92] 87  Resp:  [14-25] 25  BP: ()/(55-94) 169/79  SpO2:  [91 %-99 %] 98 %  General: On examination, the patient is resting comfortably, NAD, awake, and alert and oriented to person, place, and, and general circumstances   SKIN: There is no evidence of erythema, swelling or ecchymosis   Pulses:  dorsalis pedis and posterior tibial pulse is intact and equal bilaterally  Sensation: intact and equal bilaterally to the distal lower extremities.  Tenderness: Tenderness to over the left lateral hip, the displaced part of the fracture is palpable on the anterior hip though there is  no skin tenting or skin damage  ROM: Able to plantar dorsiflexion wiggle her toes    Contralateral side= Full range of motion, Negative joint instability findings, 5/5 motor groups about the joint, Non-tender.       RADIOGRAPHIC EVALUATION:  Personally reviewed    PERTINENT LABS:  Lab Results: personally reviewed.   @BRIEFLAB[NA,K,CL,CO2,BUN,CREATININE,GLUCOSE,GLUCOSE @  Lab Results   Component Value Date    WBC 8.8 10/26/2023    WBC 4.1 12/28/2020    HGB 10.3 10/26/2023    HGB 12.3 01/29/2021    HCT 32.3 10/26/2023    HCT 36.5 12/28/2020    MCV 99 10/26/2023     12/28/2020     10/26/2023     12/28/2020         Sowmya Wallace PA-C, PA-C  Date: 10/26/2023  Time: 1:14 PM      CC1:   Kailyn Caldwell MD    CC2:   Dede Arriola         Orthopaedic Surgery attestation:  I saw and evaluated Mehreen Camara. I agree with note as written above the TANO with additions as noted below.        HPI: The following information was obtained with the aid of Eladio, Mehreen's daughter and POA as Mehreen suffers from Lewy body dementia and is unable to recall the events that led to her ultimate presentation here.    Eladio states that Mehreen lives at a local memory care facility.  She has had recurrent falls.  She fell this morning and was unable to get up due to left hip pain imaging demonstrated a displaced intertrochanteric femur fracture for which orthopedics was consulted.  Mehreen is moving her arm spontaneously, as well as her right lower extremity and does not appear to have suffered any other injuries according to Eladio and the evaluating staff.        Past medical history: Abdominal aortic aneurysm status postrepair, Lewy body dementia.  Past surgical history: Total knee arthroplasty, abdominal aortic aneurysm repair.  Daughter denies any complications with surgical intervention  Social history: Lives in a memory care facility        Physical examination:  83-year-old female that is laying  in bed not oriented to place or time, however she is able to follow commands    Full examination of the left lower extremity demonstrates a shortened and externally rotated extremity with intact pulses.  She does have the ability to fire her gastrosoleus complex, EHL, and tibialis anterior.  Sensation exam is on reliable, however she reports is intact within the superficial peroneal, deep peroneal, and tibial nerve distributions      Imaging: Full-length femur films as well as left hip films demonstrate a reverse obliquity intertrochanteric femur fracture        Labs:  H&H: 9 8 and 32    Assessment and plan: 83-year-old female with a reverse obliquity left intertrochanteric femur fracture that suffers from quite advanced dementia whose daughter has elected to proceed with left cephalomedullary nail placement after discussing the risk, benefits, and alternatives.    Preoperatively, the nature of the procedure, risks and benefits, as well as alternatives including nonsurgical management were discussed in detail with the Eladio HAMMER in detail. I reviewed and discussed the patient's condition and relevant images with the patient. We discussed options for further evaluation and treatment, including conservative non-operative management versus surgical intervention.      From a conservative standpoint, the patient can pursue non-operative management, which would include protected weight bearing to the lower extremity, which carries a high risk of morbidity and mortality due to prolonged and diminished mobilization/ambulation and it's associated complications, which include but are not limited to blood clots (DVT/PE), skin ulcerations and pressure sores, and pneumonia. In the long term, there is also the risk of chronic pain, fracture nonunion, fracture malunion,    From a surgical standpoint, fracture fixation with cephalomedullary stabilization device.We also discussed at length the risks and benefits of fracture surgery.  Our discussion included but was not limited to the risk of pain, bleeding, infection, blood clots (DVT, PE), wound issues, continued chronic pain in the hip, post-operative joint stiffness, painful arc of motion, difficulty with ambulation, iatrogenic fracture, damage to nearby neurovascular structures, allergic reaction to implanted components, implant failure, loosening and/or failure requiring revision, and possible post-operative leg length discrepancy (apparent or actual). The possibility of intra-operative and/or post-operative medical complications such as anesthesia complications or reactions, respiratory and cardiovascular events, stroke, heart attack and/or death were also discussed.     Eladio demonstrated an understanding of these risks as well as the potential benefits of fracture fixation  surgery which would include possible improvement in pain, range of motion, and early ambulation without the long term concerns of fracture nonunion/malunion or avascular necrosis that would be associated with non-operative management Eladio demonstrated an understanding of the indications of surgery and all possible complications, and together we have decided to proceed with surgery.  All of the daughter's questions were answered and she was in agreement with the plan      Plan for left femur fixation  DNR status to remain after discussion with the family                Scott Cobos MD  [facility]  October 27, 2023

## 2023-10-27 NOTE — PROGRESS NOTES
"Handoff report received from BOBBY Burleson. Pt resting in bed, alert to only herself. Purewick in place and collecting simone colored urine. Pt is uncooperative with assessments and told staff to \"shut up and leave me alone.\" Currently has no IV access as pt removed previous IV lines.     Jerry Bosch RN on 10/27/2023 at 12:14 AM    "

## 2023-10-27 NOTE — CONSULTS
[facility]   [phone]          Assessment/Recommendations   Patient admitted fall, hip fracture requiring surgical repair.  She has a known history of aortic stenosis with an echocardiogram in 2020 showing a mean gradient across aortic valve of 35 mmHg with normal left ventricular systolic function moderate concentric left ventricular hypertrophy.  Preliminary look at echo at the bedside suggested a mean gradient of 31 mmHg.  Patient was uncooperative with the exam and ended the exam early however, so this could be an underestimate of the actual mean gradient.  Physical exam does not suggest pulmonary vascular congestion and CT scan of her chest did not show fluid in her lungs which is comforting.    History is not reliable but she denies any chest discomfort or shortness of breath.    We will review full echo report later this morning.  Given no pulmonary vascular congestion or chest discomfort, she could proceed with hip surgery.  She certainly does have an increase risk for cardiovascular morbidity mortality with any operation, but options are limited with her hip fracture.    We will continue to follow.         History of Present Illness/Subjective    Ms. Mehreen Camara is a 83 year old female with known history of aortic stenosis with an echocardiogram in 2020 showing moderate aortic stenosis with a mean gradient of 35 mmHg and concentric left ventricular hypertrophy with normal left ventricular systolic function.  The patient has significant dementia so history from her is not obtainable.  She has had recurrent falls in her memory care unit and came in after a fall with left hip pain and has a fracture of her hip.  Patient does deny that she is short of breath or having any chest discomfort.  Chest CT did not show pulmonary vascular congestion.    Chart review indicates that she has chronic pain, hypertension, osteopenia, arthritis, and hypothyroidism.  Also has significant  "dementia.    Patient states that she grew up in [place] and went to [employer] in [employer].    ECG: Personally reviewed.  Sinus rhythm at 79 bpm.  Very slight ST changes in delay in R wave progression across the precordium. ECG is quite similar to an EKG done on November 7, 2022.       Physical Examination Review of Systems   /66   Pulse 88   Temp 97.5  F (36.4  C) (Oral)   Resp 24   Ht 1.676 m (5' 6\")   Wt 64.2 kg (141 lb 9.6 oz)   SpO2 90%   BMI 22.85 kg/m    Body mass index is 22.85 kg/m .  Wt Readings from Last 3 Encounters:   10/26/23 64.2 kg (141 lb 9.6 oz)   06/20/23 62.6 kg (138 lb)   11/09/22 63.4 kg (139 lb 12.4 oz)     General Appearance:   Alert, cooperative and in no acute distress.  No dyspnea lying flat in bed.   ENT/Mouth: Pink/moist oral mucosa   EYES:  no scleral icterus, normal conjunctivae   Neck: JVP difficult to evaluate as patient lying flat in bed..  Difficult to evaluate hepatojugular reflux. Thyroid not visualized.   Chest/Lungs:   Lungs are clear to auscultation anteriorly and laterally., equal chest wall expansion.   Cardiovascular:   S1, S2 with 3/6 systolic late peaking murmur , no clicks or rubs. Brachial, radial pulses are intact and symetric. No carotid bruits noted   Abdomen:  Nontender.    Extremities: No cyanosis, clubbing and minimal pretibial edema.  Tenderness of extremities.   Skin: no xanthelasma, warm.    Neurologic: normal arm movement bilateral, no tremors     Psychiatric: Not oriented to the year and does not know who the president is.      Enc Vitals  BP: 119/66  Pulse: 88  Resp: 24  Temp: 97.5  F (36.4  C)  Temp src: Oral  SpO2: 90 %  Weight: 64.2 kg (141 lb 9.6 oz) (from memory care records)  Height: 167.6 cm (5' 6\")                                           Medical History  Surgical History Family History Social History   Past Medical History:   Diagnosis Date    AAA (abdominal aortic aneurysm)     Abdominal pain, epigastric 12/16/2013 "    Abnormal computed tomography scan 9/15/2016    Abnormal finding on imaging 9/15/2016    Acute encephalopathy 9/26/2015    Adjustment disorder with anxious mood 9/17/2018    Adjustment disorder with mixed anxiety and depressed mood 9/17/2018    Adrenal adenoma     stable, thought not to be hormonally active    Adrenal adenoma     Agitation     Anemia 11/18/2010    Aneurysm of abdominal vessel 2/25/2008    Created by [facility] Annotation: Jun 8 2011  8:07AM - Mirna Ortizica: 4.4 on 11/2013.   Needs 6-12 month u/s or CT.  Replacement Utility updated for latest IMO load  Overview:  2/08 Abd US: 5.4 cm. 3/08 CT Abd: 2.7 x 3.3. 9/08:  MRI Abd: 2.7X3.2  Next 9/09.    Anxiety state 2/26/2008    Arthritis     Asymptomatic postmenopausal status     Created by Conversion  Replacement Utility updated for latest IMO load    Back pain 5/16/2020    Harris esophagus     Harris's esophagus 1/11/2012    Created by [facility] Annotation: Feb  3 2012  8:32Cameron Tejada: Needs EGD 2/2017   Overview:  Overview:  Created by [facility] Annotation: Feb  3 2012  8:32Cameron Tejada: Needs EGD 2/2017    Benign neoplasm of ascending colon 9/19/2016    Bilateral knee pain 8/5/2020    Bloating 5/5/2017    Bradycardia 9/17/2018    Cataract 9/17/2018    Created by Conversion   Overview:  Overview:  Created by Conversion    Chest wall pain 1/20/2013    Chronic low back pain 9/29/2016    Cognitive and behavioral changes     Compression fracture of lumbar vertebra (H) 10/10/2020    Constipation     Contact dermatitis and eczema 11/22/2006    DDD (degenerative disc disease), lumbosacral 2/26/2008    Depression     Depression, major 7/13/2009    Depressive disorder     Diaphragmatic hernia 9/18/2018    Created by Conversion  Replacement Utility updated for latest IMO load  Overview:  Overview:  Created by Conversion  Replacement Utility updated for latest IMO load    Dietary counseling and  surveillance 9/15/2016    Disorder of bone and cartilage 9/17/2018    Created by [facility] Annotation: Dec 13 2012  7:41Roberta Kelyl: vit D/Ca, f/u  Dexa needed 1/2014.  Replacement Utility updated for latest IMO load  Overview:  Overview:  Created by Conversion [facility] Annotation: Dec 13 2012  7:41Roberta Kelly: vit D/Ca, f/u  Dexa needed 1/2014.  Replacement Utility updated for latest IMO load    Diverticulosis of colon 12/18/2014    Dizziness and giddiness     Created by Conversion     Dysphagia 12/17/2013    Early satiety 12/16/2013    Esophageal reflux     Created by Conversion     Essential hypertension 11/22/2006    Created by Conversion  Replacement Utility updated for latest IMO load  Overview:  Overview:  Created by Conversion  Replacement Utility updated for latest IMO load    Fall 8/5/2020    Flatulence, eructation and gas pain 2/19/2014    Gaseous abdominal distention 9/19/2016    Gastro-esophageal reflux disease with esophagitis 6/6/2017    Generalized muscle weakness 7/4/2018    GERD (gastroesophageal reflux disease)     Hemorrhage of rectum and anus 9/19/2016    Hiatal hernia     HLD (hyperlipidemia)     Hoarseness of voice 4/6/2010    HTN (hypertension)     Hypertension     Hypokalemia 6/29/2007    Hypothyroid     Hypothyroidism 9/22/2009    Created by Conversion  Replacement Utility updated for latest IMO load  Overview:  Overview:  Created by Conversion  Replacement Utility updated for latest IMO load    Insomnia due to anxiety and fear 9/17/2018    Insomnia due to mental condition     Irritable bowel syndrome 11/22/2006    Lower back pain     Major depressive disorder, recurrent episode (H) 9/17/2018    Created by Conversion  Replacement Utility updated for latest IMO load  Overview:  Overview:  Created by Conversion  Replacement Utility updated for latest IMO load    Major depressive disorder, single episode, moderate (H)     Malaise and fatigue 7/10/2007     Metabolic encephalopathy     Migraine with aura     Mixed hyperlipidemia 2/10/2008    Created by Conversion     Mood disorder due to a general medical condition     Movement disorder 9/19/2018    Multiple system atrophy P (H)     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    Murmur     Nonrheumatic aortic valve stenosis 10/23/2020    Nontoxic multinodular goiter 11/22/2006    Overview:  thyroidectomy 7/07 for atypical cells on FNA- benign cysts seen at time of surgical pathology    Obesity, unspecified     Created by Conversion     Orthostatic hypotension dysautonomic syndrome 9/22/2017    Osteoarthritis     Partial small bowel obstruction (H)     Polyp of colon 9/19/2016    Post-op pain 12/12/2018    Postoperative hypothyroidism 1/15/2013    Overview:  7/2/07-  Thyroidectomy by Dr Wang Crenshaw    Prediabetes 10/26/2010    Primary insomnia 7/4/2017    REM sleep behavior disorder 8/5/2020    Retinal migraine 4/28/2014    Rheumatic fever     thought to have had as a child    Rupture of left Achilles tendon, sequela 12/31/2019    S/P AAA repair using bifurcation graft 11/30/2015    S/P laparoscopic cholecystectomy 12/12/2018    Scoliosis     Sleep difficulties     Small bowel obstruction (H)     Thyroid disease     Thyroid nodule     removed nodules and thyroid    Tinnitus     Created by Conversion  Replacement Utility updated for latest IMO load    Undiagnosed cardiac murmurs 11/22/2006    Vitamin D deficiency 1/15/2013    Weak 4/29/2018    Weakness 5/4/2017    Weakness of both lower extremities 7/5/2018    Past Surgical History:   Procedure Laterality Date    AAA REPAIR  2015    wt bifurcation graft    CARPAL TUNNEL RELEASE RT/LT Bilateral 2013    HC REVISE MEDIAN N/CARPAL TUNNEL SURG      Description: Neuroplasty Decompression Median Nerve At Carpal Tunnel;  Recorded: 01/21/2013;  Comments: bilateral    HYSTERECTOMY  2013    IR ABDOMINAL ENDOVASCULAR STENT GRAFT  11/30/2015    JOINT REPLACEMENT Right 2003    JOINT  REPLACEMENT      LAPAROSCOPIC CHOLECYSTECTOMY N/A 2018    Procedure: LAPAROSCOPIC CHOLECYSTECTOMY;  Surgeon: Amadeo Sebastian MD;  Location: [facility] OR    ORTHOPEDIC SURGERY      UT THYROIDECTOMY      Description: Thyroid Surgery Total Thyroidectomy;  Recorded: 2011;    SPINE SURGERY  1981    cervical spine fusion    THYROIDECTOMY      XR MAJOR JOINT OR BURSA INJ/ASP BILATERAL  2020    XR MAJOR JOINT OR BURSA INJ/ASP UNILATERAL  2020    ZZC CERV SPINE FUSN,ANTER,BELOW C2      Description: Cervical Vertebral Fusion;  Recorded: 2013;  Comments:     ZZC TOTAL ABDOM HYSTERECTOMY  1985    Description: Hysterectomy;  Recorded: 2013;    [facility] TOTAL HIP ARTHROPLASTY Right     Description: Total Hip Replacement;  Recorded: 2013;  Comments: right,     Family History   Problem Relation Age of Onset    Hypertension Mother     Coronary Artery Disease Mother     Coronary Artery Disease Father     Other Cancer Brother     Parkinsonism Other     Heart Disease Mother     Heart Disease Father     Cancer Brother 57.00        colon    Hypertension Daughter     Hypertension Daughter     Neuropathy Daughter     Lupus Daughter     Heart Disease Paternal Aunt     Heart Disease Paternal Uncle     Parkinsonism Paternal Uncle     Social History     Socioeconomic History    Marital status:      Spouse name: Not on file    Number of children: Not on file    Years of education: Not on file    Highest education level: Not on file   Occupational History    Not on file   Tobacco Use    Smoking status: Former     Packs/day: .5     Types: Cigarettes     Quit date: 1994     Years since quittin.8    Smokeless tobacco: Never   Substance and Sexual Activity    Alcohol use: No    Drug use: No    Sexual activity: Not Currently   Other Topics Concern    Parent/sibling w/ CABG, MI or angioplasty before 65F 55M? Not Asked   Social History Narrative    Not on file     Social Determinants of Health      Financial Resource Strain: Not on file   Food Insecurity: Not on file   Transportation Needs: Not on file   Physical Activity: Not on file   Stress: Not on file   Social Connections: Not on file   Interpersonal Safety: Not on file   Housing Stability: Not on file          Medications  Allergies   Current Outpatient Medications   Medication Sig Dispense Refill    acetaminophen (TYLENOL) 500 MG tablet Take 2 tablets (1,000 mg) by mouth 3 times daily      bisacodyl (DULCOLAX) 10 MG suppository Place 10 mg rectally daily as needed for constipation      carbidopa-levodopa (SINEMET)  MG tablet Take carbidopa/levodopa IR 25/100 mg 1 tab at 7 am, 1 pm and 8 pm = 3 tabs daily 270 tablet 3    diclofenac (VOLTAREN) 1 % topical gel Apply 2 g topically 3 times daily      DULoxetine (CYMBALTA) 60 MG capsule Take 60 mg by mouth daily      estradiol (ESTRACE) 0.1 MG/GM vaginal cream twice a week Mon and Thurs      gabapentin (NEURONTIN) 100 MG capsule Take 100 mg by mouth 2 times daily Takes at 0700 and 1345      gabapentin (NEURONTIN) 300 MG capsule Take 300 mg by mouth At Bedtime      hydrALAZINE (APRESOLINE) 10 MG tablet Take 10 mg by mouth 3 times daily      levothyroxine (SYNTHROID/LEVOTHROID) 100 MCG tablet Take 1 tablet (100 mcg) by mouth daily      lidocaine (LIDODERM) 5 % patch Place 1 patch onto the skin every 24 hours To prevent lidocaine toxicity, patient should be patch free for 12 hrs daily.      losartan (COZAAR) 50 MG tablet Take 50 mg by mouth 2 times daily      nystatin (MYCOSTATIN) 582599 UNIT/GM external cream Apply topically 2 times daily APPLY TOPICALLY TO AREA UNDER  right BREAST      oxyCODONE (ROXICODONE) 5 MG tablet Take 1 tablet (5 mg) by mouth 3 times daily 15 tablet 0    oxyCODONE (ROXICODONE) 5 MG tablet Take 2.5 mg by mouth every 6 hours as needed for severe pain      polyethylene glycol (MIRALAX) 17 GM/Dose powder Take 17 g by mouth 2 times daily 1020 g 3    polyethylene glycol-propylene  glycol (SYSTANE ULTRA) 0.4-0.3 % SOLN ophthalmic solution Place 2 drops into both eyes every hour as needed for dry eyes      QUEtiapine (SEROQUEL) 25 MG tablet Take 1 tablet (25 mg) by mouth At Bedtime      senna-docusate (SENOKOT-S/PERICOLACE) 8.6-50 MG tablet Take 1 tablet by mouth daily      senna-docusate (SENOKOT-S/PERICOLACE) 8.6-50 MG tablet Take 1 tablet by mouth 2 times daily as needed for constipation      sodium phosphate (FLEET ENEMA) 7-19 GM/118ML rectal enema Place 1 enema rectally once as needed for constipation      vitamin D3 (CHOLECALCIFEROL) 50 mcg (2000 units) tablet Take 1 tablet (50 mcg) by mouth daily      naloxone (NARCAN) 4 MG/0.1ML nasal spray Spray 4 mg into one nostril alternating nostrils as needed for opioid reversal every 2-3 minutes until assistance arrives      Allergies   Allergen Reactions    Penicillins Anaphylaxis, Rash and Shortness Of Breath    Aspirin Nausea and GI Disturbance    Atenolol Cough    Atorvastatin Muscle Pain (Myalgia) and Nausea and Vomiting    Bupropion Nausea    Cephalexin Rash     Localized to neck area    Clindamycin Other (See Comments)     Constipation     Codeine Nausea    Ibuprofen Nausea and GI Disturbance    Lovastatin Muscle Pain (Myalgia)         Lab Results    Chemistry/lipid CBC Cardiac Enzymes/BNP/TSH/INR   Lab Results   Component Value Date    CHOL 256 (H) 09/22/2015    HDL 43 09/22/2015    TRIG 91 09/22/2015    BUN 23.9 (H) 10/27/2023     10/27/2023    CO2 26 10/27/2023    Lab Results   Component Value Date    WBC 8.1 10/27/2023    HGB 9.8 (L) 10/27/2023    HCT 32.0 (L) 10/27/2023     (H) 10/27/2023     10/27/2023    Lab Results   Component Value Date    TROPONINI 0.01 11/08/2022    TSH 1.69 02/27/2022    INR 1.04 12/24/2020

## 2023-10-27 NOTE — PROGRESS NOTES
Perla from [facility] called and report was given at 1821. Daughter and Son were at beside and went down to PACU with pt. RADHA bath and Nasal swabs preformed.   Pt was transported down in her bed, with chart, and family at beside.

## 2023-10-27 NOTE — PROGRESS NOTES
"Pt arrived onto the unit from the ED. Tor RN was the second nurse for bedside skin assessment since pt has low tung score. Pt is lethargic, but will awaken and become tearful asking \"do I have to stay in this bed, when can I walk\" and then will fall back asleep. Pt is disoriented to situation and place. Assessment was charted at bedside.   "

## 2023-10-27 NOTE — PLAN OF CARE
Orthopedic Surgery plan of care:  I did not see Mehreen this morning. I called her daughter, Ilene Dowd, and discussed her case. We reviewed Mehreen's medical history, imaging, and options. We discussed the risks, benefits, and alternatives to surgical intervention. Following this, utilizing shared decision making, we elected to proceed with ORIF L femur.     Will follow up on hospitalist clearance  NPO  Formal consult note to follow      Scott Cobos MD  Orthopedic Surgery

## 2023-10-27 NOTE — ANESTHESIA PREPROCEDURE EVALUATION
Anesthesia Pre-Procedure Evaluation    Patient: Mehreen Camara   MRN: [id number] : 1940        Procedure : Procedure(s):  INTERNAL FIXATION, FRACTURE, TROCHANTERIC, HIP, USING PINS OR RODS          Past Medical History:   Diagnosis Date    AAA (abdominal aortic aneurysm)     Abdominal pain, epigastric 2013    Abnormal computed tomography scan 9/15/2016    Abnormal finding on imaging 9/15/2016    Acute encephalopathy 2015    Adjustment disorder with anxious mood 2018    Adjustment disorder with mixed anxiety and depressed mood 2018    Adrenal adenoma     stable, thought not to be hormonally active    Adrenal adenoma     Agitation     Anemia 2010    Aneurysm of abdominal vessel 2008    Created by [facility] Annotation: 2011  8:07Danni Chopra: 4.4 on 2013.   Needs 6-12 month u/s or CT.  Replacement Utility updated for latest IMO load  Overview:   Abd US: 5.4 cm. 3/08 CT Abd: 2.7 x 3.3. :  MRI Abd: 2.7X3.2  Next .    Anxiety state 2008    Arthritis     Asymptomatic postmenopausal status     Created by Conversion  Replacement Utility updated for latest IMO load    Back pain 2020    Harris esophagus     Harris's esophagus 2012    Created by [facility] Annotation: Feb  3 2012  8:32Cameron Tejada: Needs EGD 2017   Overview:  Overview:  Created by [facility] Annotation: Feb  3 2012  8:32Cameron Tejada: Needs EGD 2017    Benign neoplasm of ascending colon 2016    Bilateral knee pain 2020    Bloating 2017    Bradycardia 2018    Cataract 2018    Created by Conversion   Overview:  Overview:  Created by Conversion    Chest wall pain 2013    Chronic low back pain 2016    Cognitive and behavioral changes     Compression fracture of lumbar vertebra (H) 10/10/2020    Constipation     Contact dermatitis and eczema 2006    DDD (degenerative disc disease),  lumbosacral 2/26/2008    Depression     Depression, major 7/13/2009    Depressive disorder     Diaphragmatic hernia 9/18/2018    Created by Conversion  Replacement Utility updated for latest IMO load  Overview:  Overview:  Created by Conversion  Replacement Utility updated for latest IMO load    Dietary counseling and surveillance 9/15/2016    Disorder of bone and cartilage 9/17/2018    Created by Conversion [facility] Annotation: Dec 13 2012  7:Roberta Padron: vit D/Ca, f/u  Dexa needed 1/2014.  Replacement Utility updated for latest IMO load  Overview:  Overview:  Created by Conversion [facility] Annotation: Dec 13 2012  7:41Roberta Kelly: vit D/Ca, f/u  Dexa needed 1/2014.  Replacement Utility updated for latest IMO load    Diverticulosis of colon 12/18/2014    Dizziness and giddiness     Created by Conversion     Dysphagia 12/17/2013    Early satiety 12/16/2013    Esophageal reflux     Created by Conversion     Essential hypertension 11/22/2006    Created by Conversion  Replacement Utility updated for latest IMO load  Overview:  Overview:  Created by Conversion  Replacement Utility updated for latest IMO load    Fall 8/5/2020    Flatulence, eructation and gas pain 2/19/2014    Gaseous abdominal distention 9/19/2016    Gastro-esophageal reflux disease with esophagitis 6/6/2017    Generalized muscle weakness 7/4/2018    GERD (gastroesophageal reflux disease)     Hemorrhage of rectum and anus 9/19/2016    Hiatal hernia     HLD (hyperlipidemia)     Hoarseness of voice 4/6/2010    HTN (hypertension)     Hypertension     Hypokalemia 6/29/2007    Hypothyroid     Hypothyroidism 9/22/2009    Created by Conversion  Replacement Utility updated for latest IMO load  Overview:  Overview:  Created by Conversion  Replacement Utility updated for latest IMO load    Insomnia due to anxiety and fear 9/17/2018    Insomnia due to mental condition     Irritable bowel syndrome 11/22/2006    Lower back pain     Major  depressive disorder, recurrent episode (H) 9/17/2018    Created by Conversion  Replacement Utility updated for latest IMO load  Overview:  Overview:  Created by Conversion  Replacement Utility updated for latest IMO load    Major depressive disorder, single episode, moderate (H)     Malaise and fatigue 7/10/2007    Metabolic encephalopathy     Migraine with aura     Mixed hyperlipidemia 2/10/2008    Created by Conversion     Mood disorder due to a general medical condition     Movement disorder 9/19/2018    Multiple system atrophy P (H)     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    Murmur     Nonrheumatic aortic valve stenosis 10/23/2020    Nontoxic multinodular goiter 11/22/2006    Overview:  thyroidectomy 7/07 for atypical cells on FNA- benign cysts seen at time of surgical pathology    Obesity, unspecified     Created by Conversion     Orthostatic hypotension dysautonomic syndrome 9/22/2017    Osteoarthritis     Partial small bowel obstruction (H)     Polyp of colon 9/19/2016    Post-op pain 12/12/2018    Postoperative hypothyroidism 1/15/2013    Overview:  7/2/07-  Thyroidectomy by Dr Wang Crenshaw    Prediabetes 10/26/2010    Primary insomnia 7/4/2017    REM sleep behavior disorder 8/5/2020    Retinal migraine 4/28/2014    Rheumatic fever     thought to have had as a child    Rupture of left Achilles tendon, sequela 12/31/2019    S/P AAA repair using bifurcation graft 11/30/2015    S/P laparoscopic cholecystectomy 12/12/2018    Scoliosis     Sleep difficulties     Small bowel obstruction (H)     Thyroid disease     Thyroid nodule     removed nodules and thyroid    Tinnitus     Created by Conversion  Replacement Utility updated for latest IMO load    Undiagnosed cardiac murmurs 11/22/2006    Vitamin D deficiency 1/15/2013    Weak 4/29/2018    Weakness 5/4/2017    Weakness of both lower extremities 7/5/2018      Past Surgical History:   Procedure Laterality Date    AAA REPAIR  2015    [facility] bifurcation graft     CARPAL TUNNEL RELEASE RT/LT Bilateral     HC REVISE MEDIAN N/CARPAL TUNNEL SURG      Description: Neuroplasty Decompression Median Nerve At Carpal Tunnel;  Recorded: 2013;  Comments: bilateral    HYSTERECTOMY      IR ABDOMINAL ENDOVASCULAR STENT GRAFT  2015    JOINT REPLACEMENT Right 2003    JOINT REPLACEMENT      LAPAROSCOPIC CHOLECYSTECTOMY N/A 2018    Procedure: LAPAROSCOPIC CHOLECYSTECTOMY;  Surgeon: Amadeo Sebastian MD;  Location: [facility] OR    ORTHOPEDIC SURGERY      PA THYROIDECTOMY      Description: Thyroid Surgery Total Thyroidectomy;  Recorded: 2011;    SPINE SURGERY  1981    cervical spine fusion    THYROIDECTOMY      XR MAJOR JOINT OR BURSA INJ/ASP BILATERAL  2020    XR MAJOR JOINT OR BURSA INJ/ASP UNILATERAL  2020    [facility] CERV SPINE FUSN,ANTER,BELOW C2      Description: Cervical Vertebral Fusion;  Recorded: 2013;  Comments:     [facility] TOTAL ABDOM HYSTERECTOMY      Description: Hysterectomy;  Recorded: 2013;    [facility] TOTAL HIP ARTHROPLASTY Right     Description: Total Hip Replacement;  Recorded: 2013;  Comments: right,       Allergies   Allergen Reactions    Penicillins Anaphylaxis, Rash and Shortness Of Breath    Aspirin Nausea and GI Disturbance    Atenolol Cough    Atorvastatin Muscle Pain (Myalgia) and Nausea and Vomiting    Bupropion Nausea    Cephalexin Rash     Localized to neck area    Clindamycin Other (See Comments)     Constipation     Codeine Nausea    Ibuprofen Nausea and GI Disturbance    Lovastatin Muscle Pain (Myalgia)      Social History     Tobacco Use    Smoking status: Former     Packs/day: .5     Types: Cigarettes     Quit date: 1994     Years since quittin.8    Smokeless tobacco: Never   Substance Use Topics    Alcohol use: No      Wt Readings from Last 1 Encounters:   10/26/23 64.2 kg (141 lb 9.6 oz)        Anesthesia Evaluation   Pt has had prior anesthetic.     No history of anesthetic complications        ROS/MED HX  ENT/Pulmonary:       Neurologic: Comment: Multisystem atrophy    (+)   dementia,                             Cardiovascular: Comment: AAA s/p aortoiliac stent    (+)  hypertension- -   -  - -                           valvular problems/murmurs type: AS Moderate.    Previous cardiac testing   Echo: Date: 10/27/2023 Results:  Interpretation Summary     The left ventricle is normal in size.  There is moderate concentric left ventricular hypertrophy.  The visual ejection fraction is >70%.  No regional wall motion abnormalities noted.  There is mild to moderate mitral stenosis.  Moderate aortic valve calcification is present.  Moderate valvular aortic stenosis.  The study was technically difficult. Patient ended study prior to completion.  Compared to echo from 11/4/20 the MS is new and the AV mean gradient has gone  from 17 to 27 mmHg.    Stress Test:  Date: Results:    ECG Reviewed:  Date: Results:    Cath:  Date: Results:   (-) taking anticoagulants/antiplatelets   METS/Exercise Tolerance:     Hematologic:     (+)      anemia,          Musculoskeletal: Comment: L femur fracture  Chronic lumbar vertebral fractures  T12 compression fractures  Scoliosis      (+)  arthritis,             GI/Hepatic:     (+) GERD,                   Renal/Genitourinary:  - neg Renal ROS     Endo:     (+)          thyroid problem,            Psychiatric/Substance Use:  - neg psychiatric ROS     Infectious Disease:  - neg infectious disease ROS     Malignancy:  - neg malignancy ROS     Other:  - neg other ROS          Physical Exam    Airway        Mallampati: III   TM distance: > 3 FB   Neck ROM: limited   Mouth opening: > 3 cm    Respiratory Devices and Support         Dental       (+) Multiple visibly decayed, broken teeth    B=Bridge, C=Chipped, L=Loose, M=Missing    Cardiovascular   cardiovascular exam normal          Pulmonary   pulmonary exam normal                OUTSIDE LABS:  CBC:   Lab Results   Component Value  Date    WBC 8.1 10/27/2023    WBC 8.8 10/26/2023    HGB 9.8 (L) 10/27/2023    HGB 10.3 (L) 10/26/2023    HCT 32.0 (L) 10/27/2023    HCT 32.3 (L) 10/26/2023     10/27/2023     10/26/2023     BMP:   Lab Results   Component Value Date     10/27/2023     10/26/2023    POTASSIUM 4.2 10/27/2023    POTASSIUM 4.0 10/26/2023    CHLORIDE 105 10/27/2023    CHLORIDE 101 10/26/2023    CO2 26 10/27/2023    CO2 30 (H) 10/26/2023    BUN 23.9 (H) 10/27/2023    BUN 24.5 (H) 10/26/2023    CR 0.63 10/27/2023    CR 0.92 10/26/2023    GLC 87 10/27/2023     (H) 10/26/2023     COAGS:   Lab Results   Component Value Date    PTT 30 12/24/2020    INR 1.04 12/24/2020     POC:   Lab Results   Component Value Date     (H) 12/29/2020     HEPATIC:   Lab Results   Component Value Date    ALBUMIN 3.8 10/26/2023    PROTTOTAL 6.3 (L) 10/26/2023    ALT <5 10/26/2023    AST 36 10/26/2023    ALKPHOS 59 10/26/2023    BILITOTAL 0.8 10/26/2023     OTHER:   Lab Results   Component Value Date    LACT 0.7 02/27/2022    A1C 5.6 07/19/2018    YADIRA 8.1 (L) 10/27/2023    PHOS 3.2 02/27/2022    MAG 2.0 05/22/2022    LIPASE 15 11/07/2022    TSH 1.69 02/27/2022    CRP 0.3 07/19/2018    SED 25 (H) 07/19/2018       Anesthesia Plan    ASA Status:  3    NPO Status:  NPO Appropriate    Anesthesia Type: General.     - Airway: ETT   Induction: Intravenous.   Maintenance: Balanced.   Techniques and Equipment:     - Lines/Monitors: 2nd IV     - Blood: T&S     Consents    Anesthesia Plan(s) and associated risks, benefits, and realistic alternatives discussed. Questions answered and patient/representative(s) expressed understanding.     - Discussed: Risks, Benefits and Alternatives for BOTH SEDATION and the PROCEDURE were discussed     - Discussed with:  Patient, Other (See Comment) (Daughter and Son)      - Extended Intubation/Ventilatory Support Discussed: Yes.      - Patient is DNR/DNI Status: Yes             DNR/DNI SUSPEND: Patient  will remain DNR for procedure per patient's daughter and son. OK with perioperative intubation for general anesthetic...    Use of blood products discussed: Yes.     - Consented: consented to blood products     Postoperative Care    Pain management: IV analgesics, Oral pain medications, Peripheral nerve block (Single Shot).   PONV prophylaxis: Ondansetron (or other 5HT-3), Dexamethasone or Solumedrol     Comments:    Other Comments: Discussed anesthetic options including spinal vs. general anesthesia with patient's family. Discussed risks of each including including but not limited to risk of hemodynamic compromise with a spinal vs general anesthetic and risk of postoperative delirium. After joint discussion we will proceed with general anesthesia and supplement with a PENG block for pain control.    Phenylephrine gtt prn. 2 PIV. Zofran + decadron.            Cameron Dennison MD

## 2023-10-27 NOTE — PLAN OF CARE
"Goal Outcome Evaluation:      Plan of Care Reviewed With: patient, family          Outcome Evaluation: VSS on RA. Was calm and cooperative at 2000. at 2200, became confused, pulled out IV, verbally aggressive, swinging at staff. Lost other IV access. Updated charge RN. Pt calm when left alone, Oriented to self only. Purewick with simone colored urine.    Problem: Adult Inpatient Plan of Care  Goal: Patient-Specific Goal (Individualized)  Description: You can add care plan individualizations to a care plan. Examples of Individualization might be:  \"Parent requests to be called daily at 9am for status\", \"I have a hard time hearing out of my right ear\", or \"Do not touch me to wake me up as it startles  me\".  Outcome: Progressing     Problem: Risk for Delirium  Goal: Improved Behavioral Control  Outcome: Progressing     Problem: Adult Inpatient Plan of Care  Goal: Optimal Comfort and Wellbeing  Outcome: Progressing       "

## 2023-10-27 NOTE — PROGRESS NOTES
[facility] MEDICINE PROGRESS NOTE      Summary: 83 year old female into [facility] due to recurrent mechanical  Falls. Pt arrived with left hip pain.  Diagnostics confirmed a left hip fracture.     Hospital course is marked by clinical support for her acute left hip  Fracture with continuation of her scheduled oxycodone normally  Taken for compression fractures. Pt does not interview well.  She has  Significant dementia.  Pt has a history of known aortic stenosis.  Pre  Op clearance has been approved per cardiology though she is  At risk due to her underlying valvular disease.  Echo is pending.     I reached out to daughter this morning by phone without success.      Problem list:     Left hip fracture; intertrochanteric, proximal femur: to OR at 1800 today  MSA/ Lewy Body dementia:  continue sinemet per home dose;  Seroquel at night; as needed additional seroquel dosing today  HTN:  hydralazine 10 mg 3 times daily, losartan 50 mg twice daily  (Hold latter); with parameters; as needed hydralazine  5.   Mechanical falls:  rehab potential is not great  6.   Compression fractures; lumbar and thoracic: pain control;   7.   History of aortic stenosis: last echo 2020; pt with murmur on exam   Though no evidence of dyspnea, overload; echo today for    Preop clearance; cardiology input appreciated;   8  dvt prevention:  scd        Romana Daley MD  [facility] Medicine  [facility]  Phone: #[phone]  Securely message with the Vocera Web Console (learn more here)  Text page via [employer] Paging/Directory     Interval History/Subjective: she is picking at her blankets; says hi;   Has voided this morning;     Physical Exam/Objective:  Temp:  [97.9  F (36.6  C)] 97.9  F (36.6  C)  Pulse:  [71-95] 85  Resp:  [14-48] 23  BP: ()/(51-94) 118/55  SpO2:  [75 %-100 %] 97 %  Body mass index is 22.85 kg/m .    GENERAL:  Alert, oriented to her name only; picking at  things;    HEAD:  Normocephalic, without obvious abnormality, atraumatic   EYES:  PERRL, conjunctiva/corneas clear, no scleral icterus, EOM's intact   NOSE: Nares normal, septum midline, mucosa normal, no drainage   THROAT: Lips, mucosa, and tongue normal; teeth and gums normal, mouth moist   NECK: Supple, symmetrical, trachea midline   BACK:   Symmetric, no curvature, ROM normal   LUNGS:   Clear to auscultation bilaterally, no rales, rhonchi, or wheezing, symmetric chest rise on inhalation, respirations unlabored   CHEST WALL:  No tenderness or deformity   HEART:  Regular rate and rhythm, S1 and S2 normal, no murmur, rub, or gallop    ABDOMEN:   Soft, non-tender, bowel sounds active all four quadrants, no masses, no organomegaly, no rebound or guarding   EXTREMITIES: Extremities normal, atraumatic, no cyanosis or edema    SKIN: Dry to touch, no exanthems in the visualized areas   NEURO: Baseline dementia; moves all 4 extremities freely though limited   PSYCH: Cooperative, behavior is appropriate      Data reviewed today: I personally reviewed all new medications, labs, imaging/diagnostics reports over the past 24 hours. Pertinent findings include:    Imaging:   Recent Results (from the past 24 hour(s))   Cervical spine CT w/o contrast    Narrative    EXAM: CT CERVICAL SPINE W/O CONTRAST  LOCATION: [facility]  DATE: 10/26/2023    INDICATION: fall, neck pain  COMPARISON: Cervical spine MRI 07/05/2018  TECHNIQUE: Routine CT Cervical Spine without IV contrast. Multiplanar reformats. Dose reduction techniques were used.    FINDINGS:  Mild reversal of the usual cervical lordosis. C5-C7 fusion. Slight anterolisthesis of C7 on T1. Cervical vertebral body heights preserved.  No acute cervical spine fracture. No prevertebral soft tissue swelling. Multilevel degenerative changes without   evidence for high-grade spinal canal narrowing.      Impression    IMPRESSION:  1.  No acute cervical spine  fracture.   Head CT w/o contrast    Narrative    EXAM: CT HEAD W/O CONTRAST  LOCATION: [facility]  DATE: 10/26/2023    INDICATION: fall, head injury  COMPARISON: None.  TECHNIQUE: Routine CT Head without IV contrast. Multiplanar reformats. Dose reduction techniques were used.    FINDINGS:  INTRACRANIAL CONTENTS: No intracranial hemorrhage, extraaxial collection, or mass effect.  No CT evidence of acute infarct. Mild presumed chronic small vessel ischemic changes. Mild generalized volume loss. No hydrocephalus.     VISUALIZED ORBITS/SINUSES/MASTOIDS: No intraorbital abnormality. No paranasal sinus mucosal disease. No middle ear or mastoid effusion.    BONES/SOFT TISSUES: No acute abnormality.      Impression    IMPRESSION:  1.  No acute intracranial injury, hemorrhage, mass, or CT evidence of recent ischemia.   CT Chest/Abdomen/Pelvis w Contrast    Narrative    EXAM: CT CHEST/ABDOMEN/PELVIS W CONTRAST  LOCATION: [facility]  DATE: 10/26/2023    INDICATION: fall, dementia, abdominal pain, left hip pain, eval for injury  COMPARISON: CT chest, abdomen and pelvis 11/07/2022  TECHNIQUE: CT scan of the chest, abdomen, and pelvis was performed following injection of IV contrast. Multiplanar reformats were obtained. Dose reduction techniques were used.   CONTRAST: Isovue 370 90ml    FINDINGS:   LUNGS AND PLEURA: Scarring in the lingula and lung bases bilaterally. No areas of consolidation. No actionable pulmonary nodules. No pleural effusion or pneumothorax.    MEDIASTINUM/AXILLAE: Heart size is normal. Mitral annular calcifications. No pericardial effusion. The thoracic aorta is nonaneurysmal with severe calcified and noncalcified plaquing but no acute abnormality. No thoracic lymphadenopathy. Postsurgical   changes in the right axilla. Thyroidectomy.    CORONARY ARTERY CALCIFICATION: Moderate.    HEPATOBILIARY: Normal liver contours. Cholecystectomy. Prominence of the  biliary tree is unchanged and likely reservoir effect.    PANCREAS: Unremarkable.    SPLEEN: Unremarkable.    ADRENAL GLANDS: Unchanged benign left adrenal adenoma (which requires no follow-up). Normal right adrenal gland.    KIDNEYS/BLADDER: Unchanged renal morphology with normal enhancement. No solid mass or obstructive uropathy. Bladder is obscured by streak artifact.    BOWEL: Small rectal stool ball without wall thickening or perirectal stranding. No bowel obstruction. No free fluid or free air.    LYMPH NODES: No lymphadenopathy.    VASCULATURE: Postoperative findings from infrarenal abdominal aortic repair with aortobiiliac stent.     PELVIC ORGANS: Uterus is surgically absent.    MUSCULOSKELETAL: Comminuted and moderately displaced left intertrochanteric femur fracture. Subacute right anterior 2nd - 3rd and left 3rd - 6th rib fractures. Healed right posterior ninth rib fracture as well as healed bilateral superior and inferior   pubic rami fractures. Partially visualized right hip prosthesis. Spine reported separately.      Impression    IMPRESSION:  1.  Comminuted, moderately displaced left intertrochanteric femur fracture. No additional acute traumatic abnormality in the chest, abdomen or pelvis with spine reported separately.  2.  Subacute right anterior 2nd - 3rd and left 3rd - 6th rib fractures.   3.  Additional chronic details in the findings.   CT Lumbar Spine Reconstructed    Narrative    EXAM: CT LUMBAR SPINE RECONSTRUCTED  LOCATION: [facility]  DATE: 10/26/2023    INDICATION: Fall, back pain.  COMPARISON: Lumbar spine MRI 11/22/2022, thoracic spine MRI 11/08/2022.  TECHNIQUE: Dedicated axial, sagittal, and coronal images of the Lumbar Spine were generated utilizing CT abdomen pelvis source data and are separately reviewed. Dose reduction techniques were used.     FINDINGS:  5 lumbar vertebral bodies. Broad convex left lumbar curvature. Leftward subluxation of L3 on L4.  T12, L3, L4 and L5 chronic endplate deformities are unchanged. No acute fracture. Degenerative changes without high-grade central spinal canal stenosis.   Severe right-sided foraminal narrowing at L3-L4. Less pronounced foraminal narrowing elsewhere.      Impression    IMPRESSION:  1.  No acute lumbar spine fracture.  2.  Chronic T12, L3, L4 and L5 endplate deformities.                 CT Thoracic Spine Reconstructed    Narrative    EXAM: CT THORACIC SPINE RECONSTRUCTED  LOCATION: [facility]  DATE: 10/26/2023    INDICATION: Fall, back pain.  COMPARISON: Thoracic spine MRI 11/08/2022.  TECHNIQUE: Dedicated axial, sagittal, and coronal images of the Thoracic Spine were generated utilizing CT chest source data and are separately reviewed. Dose reduction techniques were used.     FINDINGS:  Moderate broad convex right thoracic curvature. Chronic T12 compression fracture deformity. Degenerative changes without high-grade central spinal canal stenosis. No acute fracture.      Impression    IMPRESSION:  1.  No acute thoracic spine fracture.  2.  Chronic T12 fracture.             XR Pelvis 1/2 Views    Narrative    EXAM: XR PELVIS 1/2 VIEWS  LOCATION: [facility]  DATE: 10/26/2023    INDICATION: Fall. Left hip pain.  COMPARISON: 06/29/2023.      Impression    IMPRESSION:   New displaced varus angulated left intertrochanteric proximal femur fracture. Separate lesser trochanteric avulsive fracture fragment. No definitive dislocation of the left femoral head from the acetabulum. Chronic healed fracture deformities at both   superior and inferior pubic rami. Right total hip arthroplasty is partially seen. No acute displaced pelvic fracture is identified. Endovascular stenting redemonstrated abdominal aorta and iliac arteries. Degenerative change lower lumbar spine.    NOTE: ABNORMAL REPORT    THE DICTATION ABOVE DESCRIBES AN ABNORMALITY FOR WHICH FOLLOW-UP IS NEEDED.    XR  Femur Left 2 Views    Narrative    EXAM: XR FEMUR LEFT 2 VIEWS  LOCATION: [facility]  DATE: 10/26/2023    INDICATION: Left hip pain after fall, leg is shortened.  COMPARISON: Pelvis and left hip radiographic exam 06/29/2023.      Impression    IMPRESSION: Displaced varus angulated intertrochanteric left proximal femur fracture with separate lesser trochanteric avulsive fracture fragment. No dislocation of the left femoral head from the acetabulum. Mild left hip osteoarthritis with diffuse bone   demineralization and partial visualization of a right hip arthroplasty. Chronic healed superior and inferior pubic rami fractures on each side. Partially seen. Vascular stenting in the iliac arteries. Left total knee arthroplasty with patellar   resurfacing. Mid to distal left femur appears intact. Arterial calcification. No appreciable left knee joint effusion.    NOTE: ABNORMAL REPORT    THE DICTATION ABOVE DESCRIBES AN ABNORMALITY FOR WHICH FOLLOW-UP IS NEEDED.    XR Tibia and Fibula Left 2 Views    Narrative    EXAM: XR TIBIA AND FIBULA LEFT 2 VIEWS  LOCATION: [facility]  DATE: 10/26/2023    INDICATION: Fall, pain.  COMPARISON: None.      Impression    IMPRESSION: Anatomic alignment left tibia and fibula. No acute displaced tibia or fibula fracture. Diffuse bone demineralization. Left total knee arthroplasty with patellar resurfacing. No sizable left knee joint effusion. Arterial calcification.   Partially seen fractures at the distal aspects of the left fourth and fifth metatarsals with degenerative change in the left foot.   XR Foot Left 2 Views    Narrative    EXAM: XR FOOT LEFT 2 VIEWS  LOCATION: [facility]  DATE: 10/26/2023    INDICATION: Partially seen fractures on tib fib, fall.  COMPARISON: None.      Impression    IMPRESSION:   No good AP view of the foot is provided. There does appear to be irregularity of the distal aspect of the  fifth metatarsal but repeat views are recommended.       Labs:    Most Recent 3 BMP's:  Recent Labs   Lab Test 10/27/23  0543 10/26/23  1022 11/07/22  1805    140 141   POTASSIUM 4.2 4.0 4.6   CHLORIDE 105 101 101   CO2 26 30* 30   BUN 23.9* 24.5* 21   CR 0.63 0.92 0.77   ANIONGAP 8 9 10   YADIRA 8.1* 8.9 9.1   GLC 87 113* 99       Medications:   Personally Reviewed.  Medications    sodium chloride Stopped (10/26/23 2230)      acetaminophen  975 mg Oral TID    carbidopa-levodopa  1 tablet Oral TID    DULoxetine  60 mg Oral Daily    gabapentin  100 mg Oral BID    gabapentin  300 mg Oral At Bedtime    hydrALAZINE  10 mg Oral TID    levothyroxine  100 mcg Oral Daily    [Held by provider] losartan  50 mg Oral BID    oxyCODONE  5 mg Oral TID    polyethylene glycol  17 g Oral BID    QUEtiapine  25 mg Oral At Bedtime    senna-docusate  1 tablet Oral BID    vitamin D3  50 mcg Oral Daily

## 2023-10-28 NOTE — PROGRESS NOTES
[facility] MEDICINE PROGRESS NOTE      Summary: 83 year old female into [facility] due to recurrent mechanical  Falls. Pt arrived with left hip pain.  Diagnostics confirmed a left hip fracture.     Hospital course is marked by clinical support for her acute left hip  Fracture with continuation of her scheduled oxycodone normally  Taken for compression fractures. Pt does not interview well.  She has  Significant dementia.  Pt has a history of known aortic stenosis.  Pre  Op clearance has been approved per cardiology though she is  At risk due to her underlying valvular disease.  Echo is pending.         Problem list:     POD #1 left hip ORIF: per ortho; follow up 2 weeks  Seroquel at night; as needed additional seroquel dosing today  HTN:  hydralazine 10 mg 3 times daily, losartan 50 mg twice daily  (Hold latter); with parameters; as needed hydralazine  5.   Mechanical falls:  likely not a great rehab candidate  6.   Compression fractures; lumbar and thoracic: pain control;   7.   Aortic stenosis: echo reviewed; cardiology input appreciated;   Outpatient follow up  8.  History of MSA; lewy body dementia: continue sinemet; see   disposition  9.  dvt prevention:  baby aspirin twice daily  10.  Disposition:  per phone conversation today with case   Management and Darlyn will plan for palliative care   Conference on Monday; Darlyn (pts [facility]) will come; consult   Written for        Romana Daley MD  [facility] Medicine  [facility]  Phone: #[phone]  Securely message with the Vocera Web Console (learn more here)  Text page via [employer] Paging/Directory     Interval History/Subjective: no distress; follows simple command    Physical Exam/Objective:  Temp:  [97.6  F (36.4  C)-98.7  F (37.1  C)] 98  F (36.7  C)  Pulse:  [75-92] 85  Resp:  [11-26] 17  BP: ()/() 110/67  SpO2:  [92 %-100 %] 93 %  Body mass index is 22.35 kg/m .    GENERAL:  Alert,  "oriented to her name only; no distress   HEAD:  Normocephalic, without obvious abnormality, atraumatic   EYES:  PERRL, conjunctiva/corneas clear, no scleral icterus, EOM's intact   NOSE: Nares normal, septum midline, mucosa normal, no drainage   THROAT: Lips, mucosa, and tongue normal; teeth and gums normal, mouth moist   NECK: Supple, symmetrical, trachea midline   BACK:   Symmetric, no curvature, ROM normal   LUNGS:   Clear to auscultation bilaterally, no rales, rhonchi, or wheezing, symmetric chest rise on inhalation, respirations unlabored   CHEST WALL:  No tenderness or deformity   HEART:  Regular rate and rhythm, S1 and S2 normal, no murmur, rub, or gallop    ABDOMEN:   Soft, non-tender, bowel sounds active all four quadrants, no masses, no organomegaly, no rebound or guarding   EXTREMITIES: Post op dressing not pulled   SKIN: Dry to touch, no exanthems in the visualized areas   NEURO: Baseline dementia; moves all 4 extremities freely though limited   PSYCH: Cooperative, behavior is appropriate      Data reviewed today: I personally reviewed all new medications, labs, imaging/diagnostics reports over the past 24 hours. Pertinent findings include:    Imaging:   Recent Results (from the past 24 hour(s))   POC US Guidance Needle Placement    Narrative    Ultrasound was performed as guidance to an anesthesia procedure.  Click   \"PACS images\" hyperlink below to view any stored images.  For specific   procedure details, view procedure note authored by anesthesia.   XR Surgery GALO Fluoro L/T 5 Min w Stills    Narrative    This exam was marked as non-reportable because it will not be read by a   radiologist or a [place] non-radiologist provider.         XR Pelvis Port 1/2 Views    Narrative    EXAM: XR PELVIS PORT 1/2 VIEWS, XR FEMUR LEFT 2 VIEWS  LOCATION: [facility]  DATE: 10/28/2023    INDICATION: Status post Hip surgery  COMPARISON: 10/26/2023.      Impression    IMPRESSION: Interval " repair of the left femur fracture with intramedullary antelmo and dynamic screw. Fracture alignment is near anatomic. Old bilateral pubic rami fractures. Right hip arthroplasty.   XR Femur Left 2 Views    Narrative    EXAM: XR PELVIS PORT 1/2 VIEWS, XR FEMUR LEFT 2 VIEWS  LOCATION: [facility]  DATE: 10/28/2023    INDICATION: Status post Hip surgery  COMPARISON: 10/26/2023.      Impression    IMPRESSION: Interval repair of the left femur fracture with intramedullary antelmo and dynamic screw. Fracture alignment is near anatomic. Old bilateral pubic rami fractures. Right hip arthroplasty.       Labs:    Most Recent 3 BMP's:  Recent Labs   Lab Test 10/28/23  1008 10/27/23  0543 10/26/23  1022    139 140   POTASSIUM 4.7 4.2 4.0   CHLORIDE 104 105 101   CO2 27 26 30*   BUN 25.8* 23.9* 24.5*   CR 0.71 0.63 0.92   ANIONGAP 9 8 9   YADIRA 8.4* 8.1* 8.9   * 87 113*       Medications:   Personally Reviewed.  Medications    lactated ringers 75 mL/hr at 10/28/23 0033    sodium chloride Stopped (10/26/23 2230)      acetaminophen  975 mg Oral Q8H    aspirin  81 mg Oral BID    carbidopa-levodopa  1 tablet Oral TID    DULoxetine  60 mg Oral Daily    gabapentin  100 mg Oral BID    gabapentin  300 mg Oral At Bedtime    hydrALAZINE  10 mg Oral TID    levothyroxine  100 mcg Oral Daily    [Held by provider] losartan  50 mg Oral BID    oxyCODONE  5 mg Oral TID    polyethylene glycol  17 g Oral Daily    QUEtiapine  25 mg Oral At Bedtime    senna-docusate  1 tablet Oral BID    sodium chloride (PF)  3 mL Intracatheter Q8H    vitamin D3  50 mcg Oral Daily

## 2023-10-28 NOTE — PROGRESS NOTES
"[facility]  [phone]          Assessment/Recommendations   Patient resting comfortably, flat in bed without dyspnea and normal saturations.  Had hip surgery last evening.  She is sleepy but denies any chest pain or abdominal pain.  She says that it is \"hard to say\" if she is having hip pain.    Vital signs stable without any evidence of cardiac complication.  Please call if we can be of assistance.       Subjective   Patient denies chest discomfort or shortness of breath.  At hip surgery last night.  Does have moderate aortic stenosis.           Physical Examination Review of Systems   /67 (BP Location: Right arm, Patient Position: Semi-Sibley's, Cuff Size: Adult Regular)   Pulse 85   Temp 98  F (36.7  C) (Axillary)   Resp 17   Ht 1.676 m (5' 6\")   Wt 62.8 kg (138 lb 7.2 oz)   SpO2 93%   BMI 22.35 kg/m    Body mass index is 22.35 kg/m .  Wt Readings from Last 3 Encounters:   10/27/23 62.8 kg (138 lb 7.2 oz)   06/20/23 62.6 kg (138 lb)   11/09/22 63.4 kg (139 lb 12.4 oz)     General Appearance:   Alert, cooperative and in no acute distress.  Appears pale.       EYES:  no scleral icterus, normal conjunctivae   Neck:    Chest/Lungs:   Lungs are clear to auscultation anteriorly and laterally, equal chest wall expansion.   Cardiovascular:   S1, S2 with 3/6 systolic murmur , no clicks or rubs.    Abdomen:     Extremities: No cyanosis, clubbing or edema   Skin: no xanthelasma, warm.    Neurologic:      Psychiatric: Sleepy      Enc Vitals  BP: 110/67  Pulse: 85  Resp: 17  Temp: 98  F (36.7  C)  Temp src: Axillary  SpO2: 93 %  Weight: 62.8 kg (138 lb 7.2 oz)  Height: 167.6 cm (5' 6\")                                           Medical History  Surgical History Family History Social History   Past Medical History:   Diagnosis Date    AAA (abdominal aortic aneurysm)     Abdominal pain, epigastric 12/16/2013    Abnormal computed tomography scan 9/15/2016    Abnormal finding on imaging " 9/15/2016    Acute encephalopathy 9/26/2015    Adjustment disorder with anxious mood 9/17/2018    Adjustment disorder with mixed anxiety and depressed mood 9/17/2018    Adrenal adenoma     stable, thought not to be hormonally active    Adrenal adenoma     Agitation     Anemia 11/18/2010    Aneurysm of abdominal vessel 2/25/2008    Created by Conversion [employer] [facility] Annotation: Jun 8 2011  8:07AM Mirna Grigsbyica: 4.4 on 11/2013.   Needs 6-12 month u/s or CT.  Replacement Utility updated for latest IMO load  Overview:  2/08 Abd US: 5.4 cm. 3/08 CT Abd: 2.7 x 3.3. 9/08:  MRI Abd: 2.7X3.2  Next 9/09.    Anxiety state 2/26/2008    Arthritis     Asymptomatic postmenopausal status     Created by Conversion  Replacement Utility updated for latest IMO load    Back pain 5/16/2020    Harris esophagus     Harris's esophagus 1/11/2012    Created by [employer] [facility] Annotation: Feb  3 2012  8:32Cameron Tejada: Needs EGD 2/2017   Overview:  Overview:  Created by [employer] [facility] Annotation: Feb  3 2012  8:32Cameron Tejada: Needs EGD 2/2017    Benign neoplasm of ascending colon 9/19/2016    Bilateral knee pain 8/5/2020    Bloating 5/5/2017    Bradycardia 9/17/2018    Cataract 9/17/2018    Created by Conversion   Overview:  Overview:  Created by Conversion    Chest wall pain 1/20/2013    Chronic low back pain 9/29/2016    Cognitive and behavioral changes     Compression fracture of lumbar vertebra (H) 10/10/2020    Constipation     Contact dermatitis and eczema 11/22/2006    DDD (degenerative disc disease), lumbosacral 2/26/2008    Depression     Depression, major 7/13/2009    Depressive disorder     Diaphragmatic hernia 9/18/2018    Created by Conversion  Replacement Utility updated for latest IMO load  Overview:  Overview:  Created by Conversion  Replacement Utility updated for latest IMO load    Dietary counseling and surveillance 9/15/2016    Disorder of bone and cartilage 9/17/2018    Created by  Conversion [facility] Annotation: Dec 13 2012  7:41Roberta Kelly: vit D/Ca, f/u  Dexa needed 1/2014.  Replacement Utility updated for latest IMO load  Overview:  Overview:  Created by Conversion [facility] Annotation: Dec 13 2012  7:41NURY Roberta Goodwin: vit D/Ca, f/u  Dexa needed 1/2014.  Replacement Utility updated for latest IMO load    Diverticulosis of colon 12/18/2014    Dizziness and giddiness     Created by Conversion     Dysphagia 12/17/2013    Early satiety 12/16/2013    Esophageal reflux     Created by Conversion     Essential hypertension 11/22/2006    Created by Conversion  Replacement Utility updated for latest IMO load  Overview:  Overview:  Created by Conversion  Replacement Utility updated for latest IMO load    Fall 8/5/2020    Flatulence, eructation and gas pain 2/19/2014    Gaseous abdominal distention 9/19/2016    Gastro-esophageal reflux disease with esophagitis 6/6/2017    Generalized muscle weakness 7/4/2018    GERD (gastroesophageal reflux disease)     Hemorrhage of rectum and anus 9/19/2016    Hiatal hernia     HLD (hyperlipidemia)     Hoarseness of voice 4/6/2010    HTN (hypertension)     Hypertension     Hypokalemia 6/29/2007    Hypothyroid     Hypothyroidism 9/22/2009    Created by Conversion  Replacement Utility updated for latest IMO load  Overview:  Overview:  Created by Conversion  Replacement Utility updated for latest IMO load    Insomnia due to anxiety and fear 9/17/2018    Insomnia due to mental condition     Irritable bowel syndrome 11/22/2006    Lower back pain     Major depressive disorder, recurrent episode (H) 9/17/2018    Created by Conversion  Replacement Utility updated for latest IMO load  Overview:  Overview:  Created by Conversion  Replacement Utility updated for latest IMO load    Major depressive disorder, single episode, moderate (H)     Malaise and fatigue 7/10/2007    Metabolic encephalopathy     Migraine with aura     Mixed hyperlipidemia 2/10/2008     Created by Conversion     Mood disorder due to a general medical condition     Movement disorder 9/19/2018    Multiple system atrophy P (H)     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    Murmur     Nonrheumatic aortic valve stenosis 10/23/2020    Nontoxic multinodular goiter 11/22/2006    Overview:  thyroidectomy 7/07 for atypical cells on FNA- benign cysts seen at time of surgical pathology    Obesity, unspecified     Created by Conversion     Orthostatic hypotension dysautonomic syndrome 9/22/2017    Osteoarthritis     Partial small bowel obstruction (H)     Polyp of colon 9/19/2016    Post-op pain 12/12/2018    Postoperative hypothyroidism 1/15/2013    Overview:  7/2/07-  Thyroidectomy by Dr Wang Crenshaw    Prediabetes 10/26/2010    Primary insomnia 7/4/2017    REM sleep behavior disorder 8/5/2020    Retinal migraine 4/28/2014    Rheumatic fever     thought to have had as a child    Rupture of left Achilles tendon, sequela 12/31/2019    S/P AAA repair using bifurcation graft 11/30/2015    S/P laparoscopic cholecystectomy 12/12/2018    Scoliosis     Sleep difficulties     Small bowel obstruction (H)     Thyroid disease     Thyroid nodule     removed nodules and thyroid    Tinnitus     Created by Conversion  Replacement Utility updated for latest IMO load    Undiagnosed cardiac murmurs 11/22/2006    Vitamin D deficiency 1/15/2013    Weak 4/29/2018    Weakness 5/4/2017    Weakness of both lower extremities 7/5/2018    Past Surgical History:   Procedure Laterality Date    AAA REPAIR  2015    wt bifurcation graft    CARPAL TUNNEL RELEASE RT/LT Bilateral 2013    HC REVISE MEDIAN N/CARPAL TUNNEL SURG      Description: Neuroplasty Decompression Median Nerve At Carpal Tunnel;  Recorded: 01/21/2013;  Comments: bilateral    HYSTERECTOMY  2013    IR ABDOMINAL ENDOVASCULAR STENT GRAFT  11/30/2015    JOINT REPLACEMENT Right 2003    JOINT REPLACEMENT      LAPAROSCOPIC CHOLECYSTECTOMY N/A 12/12/2018    Procedure: LAPAROSCOPIC  CHOLECYSTECTOMY;  Surgeon: Amadeo Sebastian MD;  Location: [facility] OR    ORTHOPEDIC SURGERY      UT THYROIDECTOMY      Description: Thyroid Surgery Total Thyroidectomy;  Recorded: 2011;    SPINE SURGERY  1981    cervical spine fusion    THYROIDECTOMY      XR MAJOR JOINT OR BURSA INJ/ASP BILATERAL  2020    XR MAJOR JOINT OR BURSA INJ/ASP UNILATERAL  2020    ZZC CERV SPINE FUSN,ANTER,BELOW C2      Description: Cervical Vertebral Fusion;  Recorded: 2013;  Comments:     ZZC TOTAL ABDOM HYSTERECTOMY  1985    Description: Hysterectomy;  Recorded: 2013;    ZZC TOTAL HIP ARTHROPLASTY Right     Description: Total Hip Replacement;  Recorded: 2013;  Comments: right,     Family History   Problem Relation Age of Onset    Hypertension Mother     Coronary Artery Disease Mother     Coronary Artery Disease Father     Other Cancer Brother     Parkinsonism Other     Heart Disease Mother     Heart Disease Father     Cancer Brother 57.00        colon    Hypertension Daughter     Hypertension Daughter     Neuropathy Daughter     Lupus Daughter     Heart Disease Paternal Aunt     Heart Disease Paternal Uncle     Parkinsonism Paternal Uncle     Social History     Socioeconomic History    Marital status:      Spouse name: Not on file    Number of children: Not on file    Years of education: Not on file    Highest education level: Not on file   Occupational History    Not on file   Tobacco Use    Smoking status: Former     Packs/day: .5     Types: Cigarettes     Quit date: 1994     Years since quittin.8    Smokeless tobacco: Never   Substance and Sexual Activity    Alcohol use: No    Drug use: No    Sexual activity: Not Currently   Other Topics Concern    Parent/sibling w/ CABG, MI or angioplasty before 65F 55M? Not Asked   Social History Narrative    Not on file     Social Determinants of Health     Financial Resource Strain: Not on file   Food Insecurity: Not on file    Transportation Needs: Not on file   Physical Activity: Not on file   Stress: Not on file   Social Connections: Not on file   Interpersonal Safety: Not on file   Housing Stability: Not on file          Medications  Allergies   No current outpatient medications on file.    Allergies   Allergen Reactions    Penicillins Anaphylaxis, Rash and Shortness Of Breath    Aspirin Nausea and GI Disturbance    Atenolol Cough    Atorvastatin Muscle Pain (Myalgia) and Nausea and Vomiting    Bupropion Nausea    Cephalexin Rash     Localized to neck area    Clindamycin Other (See Comments)     Constipation     Codeine Nausea    Ibuprofen Nausea and GI Disturbance    Lovastatin Muscle Pain (Myalgia)         Lab Results    Chemistry/lipid CBC Cardiac Enzymes/BNP/TSH/INR   Lab Results   Component Value Date    CHOL 256 (H) 09/22/2015    HDL 43 09/22/2015    TRIG 91 09/22/2015    BUN 25.8 (H) 10/28/2023     10/28/2023    CO2 27 10/28/2023    Lab Results   Component Value Date    WBC 8.1 10/27/2023    HGB 8.3 (L) 10/28/2023    HCT 32.0 (L) 10/27/2023     (H) 10/27/2023     10/27/2023    Lab Results   Component Value Date    TROPONINI 0.01 11/08/2022    TSH 1.69 02/27/2022    INR 1.04 10/28/2023

## 2023-10-28 NOTE — PLAN OF CARE
Patient vital signs are at baseline: Yes  Patient able to ambulate as they were prior to admission or with assist devices provided by therapies during their stay:  Yes  Patient MUST void prior to discharge:  Yes in purewick  Patient able to tolerate oral intake:  Yes  Pain has adequate pain control using Oral analgesics:  Yes  Does patient have an identified :  Yes  Has goal D/C date and time been discussed with patient:  Yes    Pt is alert to self only, VSS on RA. Purewick in place. Dressings have small drainage, marked. Using CPOT for pain scale. Utilized scheduled and PRN medications along with non-pharm therapies for pain relief. Pt was cooperative throughout the night. Did get agitated once but resolved after pain med administration.

## 2023-10-28 NOTE — PLAN OF CARE
"Patient vital signs are at baseline: Yes  Patient able to ambulate as they were prior to admission or with assist devices provided by therapies during their stay:  No, pt did not get OOB this shift. Worked with PT.   Patient MUST void prior to discharge:  Yes Purwick in place, has dark simone urine.   Patient able to tolerate oral intake:  No, pt has had a very poor oral intake this shift. Needs to be fed.   Pain has adequate pain control using Oral analgesics:  Yes  Does patient have an identified :  Yes, daughter Eladio  Has goal D/C date and time been discussed with patient:  Yes     Pt is alert to self only, pt has been very lethargic this shift. According to daughter Eladio, this happens when she has surgery, and she can sleep \"for 2 days straight.\" Dressings have small drainage, marked. Using PAINAD for pain scale since pt is unable to express pain score . Utilized scheduled medications along with non-pharm therapies for pain relief. Pt takes pills crushed in applesauce. Pt was cooperative with cares if awake before hand. RN woke up pt for scheduled medications, pt was agitated with RN, stating \"go to hell, you're a liar and enjoy watching people die. You can take those pills and shove them up your ass\" pt then started swatting at RN and scratching at their arm. RN re approached the situation several moments later and pt took medication. Daughter stated that this behavior can happen if awakened. Left and Right wrist PIV's are saline locked and in place. Bed has  been converted to air mattress, leg pumps in place. Frequent repositioning if able. Discharge when medically stable.            "

## 2023-10-28 NOTE — PROGRESS NOTES
10/28/23 0900   Appointment Info   Signing Clinician's Name / Credentials (PT) Marquita Perkins PT DPT [facility]   Living Environment   Living Environment Comments Patient lives in a memory care faclity with recurrent falls.   Self-Care   Fall history within last six months yes   General Information   Onset of Illness/Injury or Date of Surgery 10/27/23   Referring Physician Scott Cobos   Pertinent History of Current Problem (include personal factors and/or comorbidities that impact the POC) unwitnessed fall in memory care facility resulting in a L intertrochanteric proximal femoral fracture s/p ORIF   Weight-Bearing Status - LLE touch down weight-bearing   Cognition   Affect/Mental Status (Cognition) confused;low arousal/lethargic   Orientation Status (Cognition) unable/difficult to assess   Follows Commands (Cognition) does not follow one-step commands   Pain Assessment   Patient Currently in Pain   (patient cries out in pain with movement of L LE, but not able to verbalize pain)   Range of Motion (ROM)   Range of Motion Hip, Left (Group)  (limited tolerance to movement (A/PROM) of L LE due to pain)   Bed Mobility   Comment, (Bed Mobility) Max A x 1 for supine <> sit at EOB with total assist with sitting due to posterior trunk lean.  Constant verbal/manaul assist to move LEs and for repositioning in bed   Transfers   Comment, (Transfers) unable to assess transfer due to lack of patient participation   Gait/Stairs (Locomotion)   Comment, (Gait/Stairs) ambulation unknown at time of eval   Clinical Impression   Criteria for Skilled Therapeutic Intervention Yes, treatment indicated   PT Diagnosis (PT) impaired functional mobility   Influenced by the following impairments weakness, pain   Functional limitations due to impairments transfers, gait   Clinical Presentation (PT Evaluation Complexity) stable   Clinical Presentation Rationale patient presents as medically diagnosed   Clinical Decision Making (Complexity) low  complexity   Planned Therapy Interventions (PT) transfer training;home exercise program   Risk & Benefits of therapy have been explained   (patient unable to understand due to dementia; no family present)   PT Total Evaluation Time   PT Libiaal, Low Complexity Minutes (56837) 10   Physical Therapy Goals   PT Frequency Daily   PT: Transfers Bed to/from chair;Moderate assist;Assistive device   Interventions   Interventions Quick Adds Gait Training;Therapeutic Activity;Therapeutic Procedure   Therapeutic Procedure/Exercise   Ther. Procedure: strength, endurance, ROM, flexibillity Minutes (05828) 10  (poor tolerance to treatment acivities due to high pain and limited ability to follow commands)   Symptoms Noted During/After Treatment increased pain   Treatment Detail/Skilled Intervention supine B LE PROM: knee/hip flexion, ankle DF/PF, hip Abd/Add   PT Discharge Planning   PT Plan transfer training, assess ambulation, B LE exercises   PT Discharge Recommendation (DC Rec) Transitional Care Facility   Total Session Time   Timed Code Treatment Minutes 10   Total Session Time (sum of timed and untimed services) 20

## 2023-10-28 NOTE — CONSULTS
Care Management Initial Consult    General Information  Assessment completed with: Eladio Jhaveri  Type of CM/SW Visit: Initial Assessment    Primary Care Provider verified and updated as needed: Yes   Readmission within the last 30 days: no previous admission in last 30 days      Reason for Consult: discharge planning  Advance Care Planning:            Communication Assessment  Patient's communication style: spoken language (English or Bilingual)             Cognitive  Cognitive/Neuro/Behavioral: .WDL except, arousability, orientation  Level of Consciousness: lethargic  Arousal Level: arouses to voice  Orientation: disoriented to, place, time, situation  Mood/Behavior: calm, cooperative     Speech: garbled    Living Environment:   People in home: facility resident     Current living Arrangements: assisted living  Name of Facility: [facility]   Able to return to prior arrangements: yes       Family/Social Support:  Care provided by: other (see comments) (Staff)  Provides care for: no one, unable/limited ability to care for self  Marital Status:   Children          Description of Support System: Supportive, Involved         Current Resources:   Patient receiving home care services:       Community Resources:    Equipment currently used at home:    Supplies currently used at home:      Employment/Financial:  Employment Status: retired        Financial Concerns:     Referral to Financial Worker: No       Does the patient's insurance plan have a 3 day qualifying hospital stay waiver?  No    Lifestyle & Psychosocial Needs:  Social Determinants of Health     Food Insecurity: Not on file   Depression: At risk (7/14/2022)    PHQ-2     PHQ-2 Score: 4   Housing Stability: Not on file   Tobacco Use: Medium Risk (3/23/2023)    Patient History     Smoking Tobacco Use: Former     Smokeless Tobacco Use: Never     Passive Exposure: Not on file   Financial Resource Strain: Not on file   Alcohol Use: Not on file    Transportation Needs: Not on file   Physical Activity: Not on file   Interpersonal Safety: Not on file   Stress: Not on file   Social Connections: Not on file       Functional Status:  Prior to admission patient needed assistance:   Dependent ADLs:: Ambulation-walker, Bathing, Dressing, Grooming, Incontinence, Positioning, Transfers, Wheelchair-with assist  Dependent IADLs:: Cleaning, Cooking, Laundry, Shopping, Meal Preparation, Medication Management, Money Management, Transportation       Mental Health Status:  Mental Health Status: No Current Concerns       Chemical Dependency Status:  Chemical Dependency Status: No Current Concerns             Values/Beliefs:  Spiritual, Cultural Beliefs, Mandaen Practices, Values that affect care: no               Additional Information:  SWCM called and spoke with Pt's daughter this morning to complete assessment. Pt lives at [facility].  Pt is max care for ADL's, bathing and toileting.  Pt is able to walk with walker once she up with assistance.  Pt is involved with Palliative care in the community. Pt seein [facility] for PT.  If Pt needs TCU, would like to go to [facility].  CM to follow for recommendations.     NAM Morrow

## 2023-10-28 NOTE — ANESTHESIA PROCEDURE NOTES
"PENG Procedure Note    Pre-Procedure   Staff -        Anesthesiologist:  Cameron Dennison MD       Performed By: anesthesiologist       Location: pre-op       Procedure Start/Stop Times: 10/27/2023 7:08 PM and 10/27/2023 7:20 PM       Pre-Anesthestic Checklist: patient identified, IV checked, site marked, risks and benefits discussed, informed consent, monitors and equipment checked, pre-op evaluation, at physician/surgeon's request and post-op pain management  Timeout:       Correct Patient: Yes        Correct Procedure: Yes        Correct Site: Yes        Correct Position: Yes        Correct Laterality: Yes        Site Marked: Yes  Procedure Documentation  Procedure: PENG       Laterality: left       Patient Position: sitting       Local skin infiltrated with 5 mL of 1% lidocaine.        Needle Type: short bevel       Needle Gauge: 17.        Needle Length (millimeters): 110        Ultrasound guided       1. Ultrasound was used to identify targeted nerve, plexus, vascular marker, or fascial plane and place a needle adjacent to it in real-time.       2. Ultrasound was used to visualize the spread of anesthetic in close proximity to the above referenced structure.       3. A permanent image is entered into the patient's record.    Assessment/Narrative         The placement was negative for: blood aspirated, painful injection and site bleeding       Paresthesias: No.       Bolus given via needle..        Secured via.        Insertion/Infusion Method: Single Shot       Complications: none    Medication(s) Administered   Bupivacaine 0.25% PF (Infiltration) - Infiltration   20 mL - 10/27/2023 7:08:00 PM  Medication Administration Time: 10/27/2023 7:08 PM      FOR [facility] ([facility]) ONLY:   Pain Team Contact information: please page the Pain Team Via [employer]. Search \"Pain\". During daytime hours, please page the attending first. At night please page the resident first.      "

## 2023-10-28 NOTE — OP NOTE
Operative Report    PATIENT Mehreen Camara   DATE OF SURGERY:  10/26/2023       PREOPERATIVE DIAGNOSIS   Closed left reverse obliquity intertrochanteric femur fracture    POSTOPERATIVE DIAGNOSIS   Closed left reverse obliquity intertrochanteric femur fracture.    PROCEDURE PERFORMED   Right  hip trochanteric nailing with cephalomedullary device    IMPLANTS  Implant Name Type Inv. Item Serial No.  Lot No. LRB No. Used Action   IMP CABLE SYN ORTHO COCR W/CRIMP [id number] 611.105.01S - [id number] [employer]/[place] IMP CABLE SYN ORTHO COCR W/CRIMP [id number] [id number]  [employer] [id number] Left 2 Wasted   IMP NAIL SYN CAN FEM PROX TFNA [id number] 130D LT 04.037.161S - [id number] [employer]/[place] IMP NAIL SYN CAN FEM PROX TFNA [id number] 130D LT 04.037.161S  [employer] [id number] Left 1 Implanted   IMP SCR SYN TFNA FENESTRATED LAG 90MM 04.038.190S - [id number] Met[employer]ware/[place] IMP SCR SYN TFNA FENESTRATED LAG 90MM 04.038.190S  [employer] [id number] Left 1 Implanted   SCREW BN 48MM 5MM LCK X25 STRL IM NL 04.045.048S - [id number] Met[employer]ware/[place] SCREW BN 48MM 5MM LCK X25 STRL IM NL 04.045.048S  [employer] [id number] Left 1 Implanted   SCREW BN 42MM 5MM LCK X25 STRL IM NL 04.045.042S - [id number] Met[employer] Hardware/[place] SCREW BN 42MM 5MM LCK X25 STRL IM NL 04.045.042S  [employer] [id number] Left 1 Implanted       SURGEON  Scott Cobos MD    ASSISTANT   Paola Sutton PA-C; assistant was required for patient positioning, surgical assistance, wound closure and monitoring patient's safety throughout the case.    ANESTHESIA  Choice with Block      FINDINGS:  Closed reverse obliquity Intertrochanteric femur fracture    SPECIMENS:  none    ESTIMATED BLOOD LOSS:  450cc    COMPLICATIONS   None.      INDICATION FOR PROCEDURE  Mehreen Camara is a 83 year old female who sustained a Left intertrochanteric hip fracture after a mechanical fall.  Mehreen's daughter, Eladio  makes her decisions for her as she is her POA.  We discussed that Meaghans fracture was unstable and required operative intervention.  The risks, benefits and expected outcomes of the procedure were discussed with the patient.  Questions were answered fully.  Eladio signed informed consent prior to the procedure.    PREOPERATIVE EXAMINATION:   83-year-old oriented to self only.  She did have the ability to fire her gastrosoleus complex, EHL, and tibialis anterior.  Her sensation was intact to light touch in the superficial peroneal, deep peroneal and tibial nerve distributions as she reported.  She had 2+ DP pulse.    INFORMED CONSENT  Mehreen Camara was identified in the preoperative holding area and was identified using medical record number, name, and date of birth, all of which were confirmed. The operative hip was marked using an indelible marker. Once again, all risks and benefits as well as alternatives to surgical intervention were discussed with Eladio in detail and all their questions were answered. Risks discussed included but were not limited to: persistent pain, infection, bleeding, scarring, stiffness, thromboembolic events, fracture, malalignment/malrotation, malunion/nonunion, implant complications, severe limb dysfunction, loss of limb, and loss of life.  Eladio signed informed consent and wished to proceed with surgery as scheduled.     DESCRIPTION OF PROCEDURE   Mehreen Camara was brought back to the operating room.  General anesthesia was induced without difficulty.  The patient was then transferred to the fracture table.  Arms were tucked, and the nonoperative leg was placed in a well-leg edouard and secured.  All bony prominences were well-padded.  The operative leg was placed in the traction boot.  Fluoroscopy was used to obtain uniplanar correction and restore length.  Given the highly unstable nature of this fracture the shaft did want to fall back into varus and I knew I would have to  open this and reduce it directly in order to hold the.  Given this the patient was then prepped and draped in the usual sterile fashion.    A timeout was performed prior to the procedure.  Three separate staff members confirmed the patient's name, correct site and side of surgery and procedure being performed.  Antibiotics were confirmed to be given prior to incision.  Implants were confirmed to be available and ready.    I began by first identifying the fracture site using fluoroscopy.  I made a 6 cm incision on the lateral thigh.  I made my way down to the fracture being mindful of maintaining adequate hemostasis.  Once I got down to the fracture site it was evident that there was a reverse obliquity pattern with significant comminution on the medial side.  The preoperative x-rays did show that there were fracture lines extending proximally into the greater trochanteric fragment as well as the basicervical region.  Given this I did want to just focus my attention on the 1 read I had laterally.  I started by first cleaning my fracture ends and getting an understanding of how this fracture was going to behave.  Once I had this I felt that reducing the shaft over laterally would give me the best chance of obtaining an adequate reduction.  Once I had achieved this I confirmed it with fluoroscopy.  Happy with this I went ahead and tried to place a collinear clamp in the orientation to hold my fracture fragments.  Unfortunately given the weak bone quality as well as the comminution, lateral cortex did break.  I did attempt to place cables, however given the reverse obliquity subtrochanteric nature of this fracture these were not sufficient to hold the reduction.  At this time I had to focus my attention more on an AP reduction and confirmed this with fluoroscopy as my lateral read was gone. I used indirect fluoroscopic assistance to attain a medial read and realign the shaft with the proximal fragment while being  certain not to leave the construct in varus.  Once I was happy with this I found my starting point using fluoroscopy.  I went ahead and placed my guidewire down the shaft and ensured that I had adequate placement distally.  Once I had this I went ahead and made sure that I would not start reaming until the fracture was reduced with the shaft relative to the head.  I went ahead and used a bone hook and pulled the shaft over and reamed while my assistant maintained the reduction.  I was able to ream up to a 12.5 with chatter.  This was measured to be a 400 mm nail.  I went ahead and inserted this nail while also holding the bone hook on the shaft to ensure I did not lose my reduction.  Happy with the placement of this I turned my attention next to the lag screw.  I knew the lateral cortex was blown however the lag screw would have to work by being locked into the nail.  I went ahead and placed a bone hook more proximally on the neck and pulled this into adequate alignment to ensure that I had adequate positioning of my lag screw.  I did place antirotation wires given the extremely unstable nature of this fracture.  Once I had this done I drilled according to the manufactures technique guide and placed my 90 mm lag screw ensuring a center center position on AP and lateral.  We went ahead and locked this statically ensuring there was no give. I did leave the screw a little posterior on the lateral as I wanted to ensure there is minimal chance of cut out given the stress that this will see.  Happy with this reduction we turned our attention next to the distal interlocks.  We went ahead and obtained perfect circles.  Following this I placed my distal oblique hole first.  I follow this with a more proximal hole.  As I drilled my proximal hole I did have a little bit of resistance on the medial side and when I took an x-ray I noted that it had created a small little butterfly fragment.  I went ahead and measured these 2  drill bits and placed the appropriate sized 42 and 48 mm screws that had excellent purchase.  At this time we went ahead and took final x-rays.  The wounds were copiously irrigated with saline followed by 2 g of vancomycin.  They were closed in a layered fashion.  The open incision was closed with #1 Vicryl in the fascia followed by 2-0 Vicryl and 2-0 nylon.  The insertion site as well as the distal interlock were closed with 2-0 Vicryl followed by 2-0 nylon.  The wounds were dressed with Xeroform, 4 x 4's, and Tegaderms.  The patient was taken to the post operative care unit with no issues.  At the conclusion of her case all of our counts were correct.      POSTOPERATIVE EXAM:    Mehreen is not oriented to place or time  She is firing her gastrosoleus complex, EHL, and tibialis anterior  Sensation is unreliable  2+ DP pulse    X-rays obtained at the end of the case demonstrate acceptable alignment    POSTOPERATIVE PLAN:    Return to medicine service  Touchdown weightbearing left lower extremity with a walker at all times, physical therapy and Occupational Therapy consultation  Perioperative cefazolin  Aspirin 81 mg twice daily  Maintain dressings until follow-up unless soiled  Will require follow-up with me in 2 weeks for a wound check and x-rays to be obtained at that time      Scott Cobos MD   [facility]  10/27/2023

## 2023-10-28 NOTE — PROGRESS NOTES
Occupational therapy order received, per care management report pt requires assistance with all ADLs at baseline and is not appropriate for skilled OT services. Will D/C current OT orders. Thank you.    10/28/2023 by Arianne Dill, OTR/L

## 2023-10-28 NOTE — ANESTHESIA POSTPROCEDURE EVALUATION
Patient: Mehreen Camara    Procedure: Procedure(s):  LEFT INTERNAL FIXATION, FRACTURE, TROCHANTERIC, HIP, USING PINS OR RODS       Anesthesia Type:  No value filed.    Note:  Disposition: Inpatient   Postop Pain Control: Uneventful            Sign Out: Well controlled pain   PONV: No   Neuro/Psych: Uneventful            Sign Out: Acceptable/Baseline neuro status (MSA/Dementia at baseline. Following commands w/ all extremities.)   Airway/Respiratory: Uneventful            Sign Out: Acceptable/Baseline resp. status   CV/Hemodynamics: Uneventful            Sign Out: Acceptable CV status; No obvious hypovolemia; No obvious fluid overload   Other NRE: NONE   DID A NON-ROUTINE EVENT OCCUR? No           Last vitals:  Vitals Value Taken Time   /59 10/28/23 0001   Temp 36.9  C (98.4  F) 10/27/23 2320   Pulse 89 10/28/23 0006   Resp 18 10/28/23 0006   SpO2 100 % 10/28/23 0006   Vitals shown include unfiled device data.    Electronically Signed By: Cameron Dennison MD  October 28, 2023  12:07 AM

## 2023-10-28 NOTE — PROVIDER NOTIFICATION
Dr. Dennison at bedside. Patient's . Per MDA, no interventions needed at this time. Writer verbalized understanding and will continue to monitor patient.

## 2023-10-28 NOTE — ANESTHESIA PROCEDURE NOTES
Airway       Patient location during procedure: OR       Procedure Start/Stop Times: 10/27/2023 7:35 PM  Staff -        CRNA: Emelyn San APRN CRNA       Performed By: CRNAIndications and Patient Condition       Indications for airway management: christian-procedural       Induction type:intravenous       Mask difficulty assessment: 1 - vent by mask    Final Airway Details       Final airway type: endotracheal airway    Endotracheal Airway Details        Cuffed: yes       Cuff volume (mL): 6       Successful intubation technique: direct laryngoscopy       DL Blade Type: Rodriguez 2       Grade View of Cords: 1 (neck does NOT move)       Adjucts: stylet       Position: Right       Measured from: gums/teeth       Secured at (cm): 21       Bite block used: Soft    Post intubation assessment        Placement verified by: capnometry, equal breath sounds and chest rise        Number of attempts at approach: 1       Number of other approaches attempted: 0       Secured with: silk tape       Ease of procedure: easy       Dentition: Intact and Unchanged (preop teeth missing checked Dr R/DS)       Dental guard used and removed. Dental Guard Type: Proguard Red.    Medication(s) Administered   Medication Administration Time: 10/27/2023 7:35 PM

## 2023-10-28 NOTE — ANESTHESIA CARE TRANSFER NOTE
Patient: Mehreen Morrisonbarshadia    Procedure: Procedure(s):  LEFT INTERNAL FIXATION, FRACTURE, TROCHANTERIC, HIP, USING PINS OR RODS       Diagnosis: Closed nondisplaced intertrochanteric fracture of left femur, initial encounter (H) [S72.145A]  Diagnosis Additional Information: No value filed.    Anesthesia Type:   No value filed.     Note:    Oropharynx: oropharynx clear of all foreign objects  Level of Consciousness: drowsy  Oxygen Supplementation: face mask  Level of Supplemental Oxygen (L/min / FiO2): 6  Independent Airway: airway patency satisfactory and stable  Dentition: dentition unchanged  Vital Signs Stable: post-procedure vital signs reviewed and stable  Report to RN Given: handoff report given  Patient transferred to: PACU    Handoff Report: Identifed the Patient, Identified the Reponsible Provider, Reviewed the pertinent medical history, Discussed the surgical course, Reviewed Intra-OP anesthesia mangement and issues during anesthesia, Set expectations for post-procedure period and Allowed opportunity for questions and acknowledgement of understanding      Vitals:  Vitals Value Taken Time   /89 10/27/23 2323   Temp 36.9  C (98.4  F) 10/27/23 2320   Pulse 81 10/27/23 2324   Resp 22 10/27/23 2324   SpO2 100 % 10/27/23 2324   Vitals shown include unfiled device data.    Electronically Signed By: HAILEE Urias CRNA  October 27, 2023  11:26 PM

## 2023-10-29 NOTE — PROGRESS NOTES
[facility] MEDICINE PROGRESS NOTE      Summary: 83 year old female into [facility] due to recurrent mechanical  Falls. Pt arrived with left hip pain.  Diagnostics confirmed a left hip fracture.     Hospital course is marked by clinical support for her acute left hip  Fracture with continuation of her scheduled oxycodone normally  Taken for compression fractures. Pt does not interview well.  She has  Significant dementia.  Pt has a history of known aortic stenosis.    She was cleared for surgery by cardiology; went to the OR on  10/28 for a successful ORIF.    Post op course is uneventful though due to her comorbidities I   Am concerned her rehab potential is not high.  ( See PT note from  10/28).  Pts daughter plans to be in hospital tomorrow for further  Discussion regarding goals of care.       Hospital day number 3    Problem list:     POD #2 left hip ORIF: ortho notes reviewed; TCU pending  HTN, chronic essential:  hydralazine 10 mg 3 times daily, losartan 50 mg twice daily  (Hold latter); with parameters; as needed hydralazine  3.   Mechanical falls:  likely not a great rehab candidate  4.   Compression fractures; lumbar and thoracic: pain control;   5.   Aortic stenosis: echo reviewed; cardiology input appreciated;   Outpatient follow up  6.  History of MSA; lewy body dementia: continue sinemet; prn   Seroquel  7.  Anemia, due to acute blood loss: transfuse at less than 7  8.  dvt prevention:  baby aspirin twice daily  9.  Disposition:  per phone conversation today with case   Management and Darlyn will plan for palliative care   Conference on Monday; Darlyn (pts [facility]) will come; consult   Written for        Romana Daley MD  [facility]  [facility]  Phone: #[phone]  Securely message with the Vocera Web Console (learn more here)  Text page via [employer] Paging/Directory     Interval History/Subjective: discussed with RN; pt resting on my  visit    Physical Exam/Objective:  Temp:  [97.3  F (36.3  C)-98.1  F (36.7  C)] 97.3  F (36.3  C)  Pulse:  [78-89] 78  Resp:  [16-18] 16  BP: (100-120)/(51-67) 100/51  SpO2:  [93 %-99 %] 99 %  Body mass index is 22.35 kg/m .    GENERAL:  Resting; not woken   HEAD:  Normocephalic, without obvious abnormality, atraumatic   EYES:  PERRL, conjunctiva/corneas clear, no scleral icterus, EOM's intact   NOSE: Nares normal, septum midline, mucosa normal, no drainage   THROAT: Lips, mucosa, and tongue normal; teeth and gums normal, mouth moist   NECK: Supple, symmetrical, trachea midline   BACK:   Symmetric, no curvature, ROM normal   LUNGS:   Clear to auscultation bilaterally, no rales, rhonchi, or wheezing, symmetric chest rise on inhalation, respirations unlabored   CHEST WALL:  No tenderness or deformity   HEART:  Regular rate and rhythm, S1 and S2 normal, no murmur, rub, or gallop    ABDOMEN:   Soft, non-tender, bowel sounds active all four quadrants, no masses, no organomegaly, no rebound or guarding   EXTREMITIES: Post op dressing not pulled   SKIN: Dry to touch, no exanthems in the visualized areas   NEURO: Baseline dementia; moves all 4 extremities freely though limited         Data reviewed today: I personally reviewed all new medications, labs, imaging/diagnostics reports over the past 24 hours. Pertinent findings include:    Imaging:   No results found for this or any previous visit (from the past 24 hour(s)).      Labs:    Most Recent 3 BMP's:  Recent Labs   Lab Test 10/29/23  0659 10/28/23  1008 10/27/23  0543    140 139   POTASSIUM 4.4 4.7 4.2   CHLORIDE 106 104 105   CO2 27 27 26   BUN 32.3* 25.8* 23.9*   CR 0.71 0.71 0.63   ANIONGAP 9 9 8   YADIRA 7.9* 8.4* 8.1*   GLC 98  98 126* 87       Medications:   Personally Reviewed.  Medications    lactated ringers 75 mL/hr at 10/28/23 0033    sodium chloride Stopped (10/26/23 2230)      acetaminophen  975 mg Oral Q8H    aspirin  81 mg Oral BID     carbidopa-levodopa  1 tablet Oral TID    DULoxetine  60 mg Oral Daily    gabapentin  100 mg Oral BID    gabapentin  300 mg Oral At Bedtime    hydrALAZINE  10 mg Oral TID    levothyroxine  100 mcg Oral Daily    [Held by provider] losartan  50 mg Oral BID    oxyCODONE  5 mg Oral TID    polyethylene glycol  17 g Oral Daily    QUEtiapine  25 mg Oral At Bedtime    senna-docusate  1 tablet Oral BID    sodium chloride (PF)  3 mL Intracatheter Q8H    vitamin D3  50 mcg Oral Daily

## 2023-10-29 NOTE — PROGRESS NOTES
"Assessment: 2 Days Post-Op  S/P Procedure(s):  LEFT INTERNAL FIXATION, FRACTURE, TROCHANTERIC, HIP, USING PINS OR RODS     Plan:   - Continue PT/OT  - Weightbearing status: TTWB with walker   - Anticoagulation: ASA 81 PO BID in addition to SCDs, vicente stockings and early ambulation.  - Discharge planning: TCU       Subjective:  Pain: mild  Nausea, Vomiting:  No  Lightheadedness, Dizziness:  No  Neuro:  Patient denies new onset numbness or paresthesias    Patient on 1:1, grimace with log roll of the hip. Aske to 'get me out here' throughout exam. Cooperative.     Objective:  /51 (BP Location: Right arm)   Pulse 78   Temp 97.3  F (36.3  C) (Oral)   Resp 16   Ht 1.676 m (5' 6\")   Wt 62.8 kg (138 lb 7.2 oz)   SpO2 99%   BMI 22.35 kg/m    The patient is A&Ox3. Appears comfortable.   Sensation is intact.  Dorsiflexion and plantar flexion is intact.  Dorsalis pedis pulse intact.  Calves are soft and non-tender. Negative Cricket's.  The incision is covered. Dressing C/D/I.    No drain     Pertinent Labs   Lab Results: personally reviewed.   Lab Results   Component Value Date    INR 1.04 10/28/2023    INR 1.04 12/24/2020     Lab Results   Component Value Date    WBC 8.1 10/27/2023    HGB 8.0 (L) 10/29/2023    HCT 32.0 (L) 10/27/2023     (H) 10/27/2023     10/27/2023     Lab Results   Component Value Date     10/29/2023    CO2 27 10/29/2023         Report completed by:  Chiquita Barrios PA-C, TANO  Date: 10/29/2023  Time: 7:30 AM    "

## 2023-10-29 NOTE — PLAN OF CARE
"Patient vital signs are at baseline: Yes  Patient able to ambulate as they were prior to admission or with assist devices provided by therapies during their stay:  No,  Reason:  pt did not get out of bed during shift.  Patient MUST void prior to discharge:  Yes, using purewick due to incontinence   Patient able to tolerate oral intake:  Yes  Pain has adequate pain control using Oral analgesics:  Yes  Does patient have an identified :  Yes, daughter Eladio  Has goal D/C date and time been discussed with patient:  Yes    Pt is A&O to self only during shift, VSS on RA. Purewick in place. Dressing has small dried drainage, marked. CMS intact. Blanchable redness on coccyx area, cream applied. Using PAINAD scale. Scheduled medications along with non-pharm therapies use for pain relief. Pt had BM during shift. Pt became very aggitated and combative, trying to hit people, crying and pulling at IV sites. Tan arm sleeves used to try to prevent pulling at IVs, however patient would take them off and did not like wearing them.1:1 was initiated at 2300. Pt states frequently \"get me out of here\"  "

## 2023-10-29 NOTE — PROGRESS NOTES
Pt has slightly reddended coccyx area. Mepilex sacrum applied and q2 turns.    Pt noted to have large hematoma- bruising on left medial thigh. Will continue to monitor as it appears post surgical.

## 2023-10-29 NOTE — PLAN OF CARE
Patient vital signs are at baseline: Yes  Patient able to ambulate as they were prior to admission or with assist devices provided by therapies during their stay:  No, pt did not get OOB this shift. Doing frequent repositioning.   Patient MUST void prior to discharge:  Yes Purwick in place, has dark simone urine. Pt has not had a BM since this AM. Pt is frequently checked to ensure she is clean and dry. Mepalex in place on coccyx. Cream has been applied to bottom area.   Patient able to tolerate oral intake:  yes, pt has had a good oral intake. Pt had family visit today who fed the patient pie and chocolates. Pt has been feeding herself today.   Pain has adequate pain control using Oral analgesics:  Yes, pt had one episode of crying. Resolved with PRN Hydroxyzine.   Does patient have an identified :  Yes, daughter Eladio  Has goal D/C date and time been discussed with patient:  Yes     Pt is alert to self only, pt has been awake for most of the shift. Pt has been cooperative with cares and has not pulled at any lines.  Dressings have small drainage, marked. Using PAINAD for pain scale since pt is unable to express pain score . Utilized scheduled medications, PRN's along with non-pharm therapies for pain relief. Pt takes pills whole in applesauce.  Left and Right wrist PIV's are saline locked and in place. Bed has been converted to air mattress, leg pumps in place. Frequent repositioning if able. Discharge when medically stable.

## 2023-10-30 NOTE — CONSULTS
"Palliative Care Consultation Note  [facility]      Patient: Mehreen Orellanaitbarth  Date of Admission:  10/26/2023    Requesting Clinician / Team: Dr. Daley  Reason for consult: Goals of care  goals of care; pt with multisystem atrophy      Recommendations & Counseling     GOALS OF CARE:   Goals are life prolonging with limits.  Would like to see if patient is able to participate in PT/OT and regain strength, and then revisit goals of care.  If successful then would consider TCU, if not then consider hospice.   Family is aware comfort-focused care would likely allow a more aggressive pain regimen and she may be eligible for hospice. Of note, per family patient had been on hospice a couple of years ago after a fall with fracture, then \"graduated\".    Daughter states patient was supposed to follow up with her neurologist for 6 month check in, but ended up in the hospital.  She is wondering if adjustment in her sinemet dose could possibly help with her impulsivity, hallucinations, and ultimately her fall risk.  Defer to hospitalist-discussed with Dr. Daley.   Will continue to follow.  She is followed by Dr. Pace in [facility] outpatient palliative care clinic.  Recommend follow up after discharge from hospital or TCU, if she doesn't enroll in hospice.  In addition, may need to revisit appropriate placement in the longer term as it seems her memory care may be maxed in terms of how much care they can provide for Mehreen.     ADVANCE CARE PLANNING:  Health care directive on file, with primary health care agent daughter Alysa and secondary daughter Irasema ROBERT on file, completed 6/2022 indicating her wish for no CPR and comfort focused medical treatments.  Code status: No CPR- Do NOT Intubate    MEDICAL MANAGEMENT:   Palliative care is not actively managing symptoms at this time.     PSYCHOSOCIAL/SPIRITUAL SUPPORT:   ~ 10 years ago.  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     Palliative Care will continue to follow. Thank you for the consult and allowing us to aid in the care of Mehreen Camara.    These recommendations have been discussed with Dr. Daley, Ignacio RN, Kate RN Care Manager.    PALOMO Banks  Securely message with Vocera (more info)  Text page via [facility] Paging/Directory       Assessment      Mehreen Camara is a 83 year old female with a past medical history of multi-system atrophy, Lewy Body dementia, aortic stenosis, history of falls who presented on 10/26/23 with left hip pain and was found to have an acute left intertrochanteric proximal femur fracture.  She also has old compression fractures of T12, L3, L4, L5.  She underwent a right hip trochanteric nailing on 10/27.   Has been followed by outpatient palliative clinic, with last visit in 6/2023.     Today, the patient was seen for:  Goals of care    Palliative Care Summary:   Met with patient and daughter Eladio in room.  Patient minimally participated due to cognitive status at the time of our visit.   Introduced the role of palliative care as an interdisciplinary team that cares for patients with serious illness to help support symptom management, communication, coping for patients and their families as well as support with medical decision making.  Patient was diagnosed with MSA approximately 10 years ago and is followed by neurology at [facility].  Per daughter, she has frequent falls many of which were due to her \"missing the chair\" when sitting down.  Has been more impulsive in the afternoon between 3 and 5 and this also seems to be when she falls most.  Eladio is wondering about adjusting her sinemet dose, as her behavior seems to be more impulsive about 30 minutes after taking.  She also acknowledges that falls may be " "more likely due to her autonomic dysfunction and orthostatic hypotension.    Eladio shared that Mehreen had a fall with serious injury (pelvic fracture) and other falls with injuries/broken bones.  At one point after a fall, she actually enrolled in hospice and then \"graduated.\"  She states patient can have very good moments during the day (alert, oriented, going for car rides and having conversation) and then moments where she is either lethargic, confused, or impulsive.  Some concern expressed about the ability of memory care being able to attend to her needs, due to her impulsive behavior.    We reviewed options moving forward including more restorative approach versus comfort focused approach to care.  Eladio thinks patient would want to \"try\" to see if she can get stronger and work with therapy, but this may be challenging with her level of pain and need for higher dosing of her pain medication.  Hope is that she can work with PT/OT and show some improvement or at least be able to determine her new baseline.  If no improvement, likely would be interested in hospice.      Prognosis, Goals, & Planning:    Functional Status just prior to this current hospitalization:  ECOG3 (Capable of only limited self-care; needs help with ADLs; in bed/chair >50% of waking hours)  Lives in memory care    Prognosis, Goals, and/or Advance Care Planning:  We discussed general treatment options (full/restorative, selective/conservatives, and comfort only/hospice). We then discussed how these specifically apply to Mehreen given her MSA, lewy body dementia, frequent falls.  Based on this discussion, domingo has decided to continue with selective treatments to see if she can have improvement and to see what her new baseline will look like.      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 Notable medications     ROS:  Difficulty completing ROS as patient was drowsy and confused.  Endorses pain in her left hip.  Seemed to improve after repositioning.      Physical Exam   Vital Signs with Ranges  Temp:  [97.5  F (36.4  C)-97.6  F (36.4  C)] 97.6  F (36.4  C)  Pulse:  [78-86] 78  Resp:  [17] 17  BP: (136-147)/(66-83) 147/67  SpO2:  [92 %-94 %] 92 %  138 lbs 7.18 oz    PHYSICAL EXAM:  Constitutional: Lying in bed, mostly with eyes closed, picking at blanket at times.  NAD  Respiratory: Breathing nonlabored at rest  NEURO: Oriented to self only.  Moves all extremities  SKIN: Warm and dry    Data reviewed:  Recent Labs   Lab 10/30/23  0613 10/29/23  0659 10/28/23  1008 10/27/23  1001 10/27/23  0543 10/26/23  1022   WBC  --   --   --  8.1  --  8.8   HGB 7.9* 8.0* 8.3* 9.8*  --  10.3*   MCV  --   --   --  102*  --  99   PLT  --   --   --  151  --  182   INR  --   --  1.04  --   --   --    NA  --  142 140  --  139 140   POTASSIUM  --  4.4 4.7  --  4.2 4.0   CHLORIDE  --  106 104  --  105 101   CO2  --  27 27  --  26 30*   BUN  --  32.3* 25.8*  --  23.9* 24.5*   CR  --  0.71 0.71  --  0.63 0.92   ANIONGAP  --  9 9  --  8 9   YADIRA  --  7.9* 8.4*  --  8.1* 8.9   GLC  --  98  98 126*  --  87 113*   ALBUMIN  --   --   --   --   --  3.8   PROTTOTAL  --   --   --   --   --  6.3*   BILITOTAL  --   --   --   --   --  0.8   ALKPHOS  --   --   --   --   --  59   ALT  --   --   --   --   --  <5   AST  --   --   --   --   --  36       110 MINUTES SPENT BY ME on the date of service doing chart review,  history, exam, documentation & further activities per the note.

## 2023-10-30 NOTE — PROGRESS NOTES
"Orthopedic Progress Note      Assessment: 3 Days Post-Op  S/P Procedure(s):  LEFT INTERNAL FIXATION, FRACTURE, TROCHANTERIC, HIP, USING PINS OR RODS @    Plan:   - Continue PT/OT.   - Weightbearing status: TTWB with walker   - Anticoagulation: ASA in addition to SCDs, vicente stockings and early ambulation.  - Discharge planning: TCU  - Off 1:1 today but continues to have intermittent episodes of agitation   - Dressing changed today as she ripped one in half     Subjective:  Pain: Well controlled on oral meds   Nausea, Vomiting:  No  Chest pain: No  Lightheadedness, Dizziness:  No  Neuro:  Patient denies new onset numbness or paresthesias     Patient doing well on POD #3. Sleepy this morning and redirectable     Objective:  BP (!) 147/67 (BP Location: Right arm, Patient Position: Semi-Sibley's, Cuff Size: Adult Regular)   Pulse 78   Temp 97.6  F (36.4  C) (Axillary)   Resp 17   Ht 1.676 m (5' 6\")   Wt 62.8 kg (138 lb 7.2 oz)   SpO2 92%   BMI 22.35 kg/m    The patient is A&Ox3. Appears comfortable, sitting up at bedside.  Calves are soft and non-tender bilaterally  Brisk capillary refill in the toes.   Palpable left dorsalis pedis pulse. Foot warm & well-perfused.  Sensation is intact to light touch & equal bilaterally in the femoral, DP, SP & tibial nerve distributions.  ROM: Flexes at left hip. Appropriately flexes & extends all toes bilaterally.   Motor: +5/5 dorsiflexion, plantar flexion & EHL bilaterally. Fires quad.   Leg lengths equal.  left hip dressing C/D/I without strikethrough, no surrounding erythema.       5/5 TA/GSC/EHL.         Pertinent Labs   Lab Results: personally reviewed.   Lab Results   Component Value Date    INR 1.04 10/28/2023    INR 1.04 12/24/2020     Lab Results   Component Value Date    WBC 8.1 10/27/2023    HGB 7.9 (L) 10/30/2023    HCT 32.0 (L) 10/27/2023     (H) 10/27/2023     10/27/2023     Lab Results   Component Value Date     10/29/2023    CO2 27 10/29/2023 "         Report completed by:  Sowmya Wallace PA-C/Dr. Cobos  [facility]    Date: 10/30/2023  Time: 9:22 AM

## 2023-10-30 NOTE — PLAN OF CARE
Patient vital signs are at baseline: Yes  Patient able to ambulate as they were prior to admission or with assist devices provided by therapies during their stay:  No,  Reason:  did not get up during shift.  Patient MUST void prior to discharge:  Yes, using purewick  Patient able to tolerate oral intake:  Yes  Pain has adequate pain control using Oral analgesics:  Yes  Does patient have an identified :  Yes. Daughter Eladio  Has goal D/C date and time been discussed with patient:  Yes    Pt is A&O to self only, VSS on RA. Purewick in place. Frequent repositioning. Dressing has small dried drainage. Dressing is reinforced with tape due to patient ripping some of it off- see note. CMS intact. Mepilex in place on coccyx, barrier cream applied on surrounding redness. Pt was aggitated at times but was able to redirect and clam down. Overall cooperative during shift.

## 2023-10-30 NOTE — PLAN OF CARE
Patient vital signs are at baseline: Yes  Patient able to ambulate as they were prior to admission or with assist devices provided by therapies during their stay:  No, pt did not work with therapies today, do to being on the wait list. Pt did get up to the bedside commode with 2 assist. Pt was unable to follow the TTWB, but did ok with pivot ambulation.  Pt is being  repositioned frequently.   Patient MUST void prior to discharge:  Yes Purwick in place, has dark simone urine. Pt had a large BM this shift in the commode.  Patient able to tolerate oral intake:  yes, pt has had a good oral intake. Pt ate lunch with daughter Eladio. Day RN ordered pt dinner to arrive at 1730.   Pain has adequate pain control using Oral analgesics:  Yes.   Does patient have an identified :  Yes, daughter Eladio  Has goal D/C date and time been discussed with patient:  Yes     Pt is alert to self only, pt has small episodes of being awake when she will cooperate with cares, but then will quickly fall asleep. Pt has not pulled at any lines.  Dressings have small drainage, marked. Orthopedics changed center dressing on pt at 0930, since it had to be  reinforced overnight.  Using PAINAD for pain scale since pt is unable to express pain score Utilized scheduled medications along with non-pharm therapies for pain relief. Pt takes pills whole in applesauce or whole with sips of water, pt took 2 pills at a time.  Left and Right wrist PIV's are saline locked and in place. Bed has been converted to air mattress, leg pumps in place. Discharge when medically stable.     [id number] Ignacio Marmolejo RN

## 2023-10-30 NOTE — PROGRESS NOTES
[facility] MEDICINE PROGRESS NOTE      Summary: 83 year old female into [facility] due to recurrent mechanical  Falls. Pt arrived with left hip pain.  Diagnostics confirmed a left hip fracture.     Hospital course is marked by clinical support for her acute left hip  Fracture with continuation of her scheduled oxycodone normally  Taken for compression fractures.   She was taken to OR on 10/27  For an ORIF of the left hip.  Post op she has been slow to re-engage.    This morning she is more vigorous though with an obvious considerable  Amount of dementia.  Daughter is at bedside.  We discussed many things  Including her rehabilitation potential. At this point we agreed upon  Changing her neurontin dosing a bit along with her sinemet.  Daughter  Would like to give her neurologic status a chance to awaken as much  As possible.    The patient only received 1 extra dose of narcotic yesterday.  The PT note has been reviewed.      Hospital day number 3    Problem list:     POD #2 left hip ORIF: ortho notes reviewed; TCU pending  HTN, chronic essential:  hydralazine 10 mg 3 times daily, losartan 50 mg twice daily  (Hold latter); with parameters; as needed hydralazine  3.   Mechanical falls:  likely not a great rehab candidate  4.   Compression fractures; lumbar and thoracic: pain control;   5.   Aortic stenosis: echo reviewed; cardiology input appreciated;   Outpatient follow up  6.  History of MSA; lewy body dementia: will cut sinemet in half   For a few days to see if this improves things  7.  Anemia, due to acute blood loss: transfuse at less than 7 (7.9)  8.  dvt prevention:  baby aspirin twice daily  9.  Disposition:  unclear; if she shows some rehab potential then to TCU      Romana Daley MD  [facility] Medicine  [facility]  Phone: #[phone]  Securely message with the Vocera Web Console (learn more here)  Text page via [facility] Paging/Directory      Interval History/Subjective: interactive a bit with daughter    Physical Exam/Objective:  Temp:  [97.6  F (36.4  C)] 97.6  F (36.4  C)  Pulse:  [78-91] 91  Resp:  [17-18] 18  BP: (134-147)/(65-70) 134/70  SpO2:  [91 %-92 %] 91 %  Body mass index is 22.35 kg/m .    GENERAL:  Resting; not woken   HEAD:  Normocephalic, without obvious abnormality, atraumatic   EYES:  PERRL, conjunctiva/corneas clear, no scleral icterus, EOM's intact   NOSE: Nares normal, septum midline, mucosa normal, no drainage   THROAT: Lips, mucosa, and tongue normal; teeth and gums normal, mouth moist   NECK: Supple, symmetrical, trachea midline   BACK:   Symmetric, no curvature, ROM normal   LUNGS:   Clear to auscultation bilaterally, no rales, rhonchi, or wheezing, symmetric chest rise on inhalation, respirations unlabored   CHEST WALL:  No tenderness or deformity   HEART:  Regular rate and rhythm, S1 and S2 normal, no murmur, rub, or gallop    ABDOMEN:   Soft, non-tender, bowel sounds active all four quadrants, no masses, no organomegaly, no rebound or guarding   EXTREMITIES: Post op dressing not pulled   SKIN: Dry to touch, no exanthems in the visualized areas   NEURO: Baseline dementia; moves all 4 extremities freely though limited         Data reviewed today: I personally reviewed all new medications, labs, imaging/diagnostics reports over the past 24 hours. Pertinent findings include:    Imaging:   No results found for this or any previous visit (from the past 24 hour(s)).      Labs:    Most Recent 3 BMP's:  Recent Labs   Lab Test 10/29/23  0659 10/28/23  1008 10/27/23  0543    140 139   POTASSIUM 4.4 4.7 4.2   CHLORIDE 106 104 105   CO2 27 27 26   BUN 32.3* 25.8* 23.9*   CR 0.71 0.71 0.63   ANIONGAP 9 9 8   YADIRA 7.9* 8.4* 8.1*   GLC 98  98 126* 87       Medications:   Personally Reviewed.  Medications    lactated ringers 75 mL/hr at 10/28/23 0033      acetaminophen  975 mg Oral Q8H    aspirin  81 mg Oral BID    carbidopa-levodopa   1 half-tab Oral TID    DULoxetine  60 mg Oral Daily    gabapentin  100 mg Oral TID    hydrALAZINE  10 mg Oral TID    levothyroxine  100 mcg Oral Daily    [Held by provider] losartan  50 mg Oral BID    oxyCODONE  5 mg Oral TID    polyethylene glycol  17 g Oral Daily    QUEtiapine  25 mg Oral At Bedtime    senna-docusate  1 tablet Oral BID    sodium chloride (PF)  3 mL Intracatheter Q8H    vitamin D3  50 mcg Oral Daily

## 2023-10-31 NOTE — PLAN OF CARE
Problem: Fall Injury Risk  Goal: Absence of Fall and Fall-Related Injury  Outcome: Progressing   Patient vital signs are at baseline: Yes  Patient able to ambulate as they were prior to admission or with assist devices provided by therapies during their stay:  No refused activity, turns to offload pressure if accepting while in bed.   Patient MUST void prior to discharge:  Yes  Patient able to tolerate oral intake:  Yes  Pain has adequate pain control using Oral analgesics:  Yes  Does patient have an identified :  Yes  Has goal D/C date and time been discussed with patient:  Yes  Goal Outcome Evaluation:  Pt alert to self, reoriented as needed. Surgical site CDI. Alarms on for safety. Frequent rounding done. SCD's on. No PIV in place at this time.

## 2023-10-31 NOTE — PROGRESS NOTES
"Orthopedic Progress Note      Assessment: 3 Days Post-Op  S/P Procedure(s):  LEFT INTERNAL FIXATION, FRACTURE, TROCHANTERIC, HIP, USING PINS OR RODS @    Plan:   - Continue PT/OT.   - Weightbearing status: TTWB with walker   - Anticoagulation: ASA in addition to SCDs, vicente stockings and early ambulation.  - Discharge planning: TCU  - Off 1:1 today but continues to have intermittent episodes of agitation   - Dressing intact     Subjective:  Pain: Well controlled on oral meds   Nausea, Vomiting:  No  Chest pain: No  Lightheadedness, Dizziness:  No  Neuro:  Patient denies new onset numbness or paresthesias     Patient doing well on POD #4. Sleepy this morning and redirectable     Objective:  BP (!) 157/77 (BP Location: Right arm)   Pulse 87   Temp 97.8  F (36.6  C) (Oral)   Resp 18   Ht 1.676 m (5' 6\")   Wt 62.8 kg (138 lb 7.2 oz)   SpO2 95%   BMI 22.35 kg/m    The patient is A&Ox3. Appears comfortable, sitting up at bedside.  Calves are soft and non-tender bilaterally  Brisk capillary refill in the toes.   Palpable left dorsalis pedis pulse. Foot warm & well-perfused.  Sensation is intact to light touch & equal bilaterally in the femoral, DP, SP & tibial nerve distributions.  ROM: Flexes at left hip. Appropriately flexes & extends all toes bilaterally.   Motor: +5/5 dorsiflexion, plantar flexion & EHL bilaterally. Fires quad.   Leg lengths equal.  left hip dressing C/D/I without strikethrough, no surrounding erythema.       5/5 TA/GSC/EHL.         Pertinent Labs   Lab Results: personally reviewed.   Lab Results   Component Value Date    INR 1.04 10/28/2023    INR 1.04 12/24/2020     Lab Results   Component Value Date    WBC 8.1 10/27/2023    HGB 8.2 (L) 10/31/2023    HCT 32.0 (L) 10/27/2023     (H) 10/27/2023     10/27/2023     Lab Results   Component Value Date     10/29/2023    CO2 27 10/29/2023         Report completed by:  Sowmya Wallace PA-C/Dr. Papito Guillaume " [facility]    10/31/23

## 2023-10-31 NOTE — PLAN OF CARE
Problem: Adult Inpatient Plan of Care  Goal: Plan of Care Review  Description: The Plan of Care Review/Shift note should be completed every shift.  The Outcome Evaluation is a brief statement about your assessment that the patient is improving, declining, or no change.  This information will be displayed automatically on your shift  note.  Outcome: Progressing   Goal Outcome Evaluation:  Patient pain measured using PAINAD score overnight, patient scored 0.   Slept between cares.   Dressing on Hip remains C/D/I.   Patient alert and oriented x0.   Patient had elevated BP overnight, see previous note for detail. Stable on RA.   Patient turned q2 hours for skin integrity.   External catheter in overnight for incontinence care.   Bed alarm on for patient safety.

## 2023-10-31 NOTE — PROGRESS NOTES
Care Management Follow Up    Length of Stay (days): 5    Expected Discharge Date: 11/01/2023     Concerns to be Addressed: PT to evaluate today   Patient plan of care discussed at interdisciplinary rounds: Yes    Anticipated Discharge Disposition: TBD     Anticipated Discharge Services: TBD  Anticipated Discharge DME: TBD    Patient/family educated on Medicare website which has current facility and service quality ratings: no  Education Provided on the Discharge Plan: Yes  Patient/Family in Agreement with the Plan: yes    Additional Information:  Chart reviewed. Palliative Care consult yesterday. PT recommendations for TCU.     CM met with patient. She indicates agreeable to TCU placement and confirms preference for [facility] as previously noted. She gives verbal consent for CM to speak with her daughters to discuss discharge plans. CM sent referral per request. CM unable to reach daughter Eladio or Mari via phone at this time.      9:34 AM  Daughter Eladio returned call to CM and confirms wanting referral sent to [facility]. CM provided update to daughter that patient is currently on schedule to work with PT at 1:30pm today; daughter plans to visit around that time.     PT recommendations for LTC.  CM discussed briefly with MD and palliative care. CM attempted to meet with daughter in patient room but was unable to today. CM will need to follow up tomorrow.       Kate Wisdom RN

## 2023-10-31 NOTE — PROVIDER NOTIFICATION
Patient has BP of 181/80. Patient confused to nurse unable to assess if she is having any symptoms with increased BP's. No visible pain. Patient currently resting in bed.     Provider paged, provider called back. See new orders.     Nurse rechecked patients BP before hydralazine administrations. BP no longer meets parameters. Will continue to monitor

## 2023-10-31 NOTE — PROGRESS NOTES
[facility] MEDICINE PROGRESS NOTE      Summary: 83 year old female into [facility] [place] due to recurrent mechanical  Falls. Pt arrived with left hip pain.  Diagnostics confirmed a left hip fracture.     Hospital course is marked by clinical support for her acute left hip  Fracture with continuation of her scheduled oxycodone normally  Taken for compression fractures.   She was taken to OR on 10/27  For an ORIF of the left hip.  Post op she has been slow to re-engage.    Today she is up in the chair looking around. She does not engage.  When I leave the room and observe she picks at her blankets; knitting  On them.          Hospital day number 5    Problem list:     POD #3 left hip ORIF: PT tries to work with her though difficult for  A full session  HTN, chronic essential:  hydralazine 10 mg 3 times daily, losartan 50 mg twice daily  (Hold latter); with parameters; as needed hydralazine  3.   Mechanical falls:  likely not a great rehab candidate  4.   Compression fractures; lumbar and thoracic: pain control;   5.   Aortic stenosis: echo reviewed; cardiology input appreciated;   Outpatient follow up  6.  History of MSA; lewy body dementia: sinemet cut in half yesterday;   No appreciable changes  7.  Anemia, due to acute blood loss: stable; transfuse at less than 7 (7.9)  8.  dvt prevention:  baby aspirin twice daily  9.  Disposition:  unclear; if she shows some rehab potential then to EMMY Daley MD  [facility]  [facility]  [facility]  Phone: #[phone]  Securely message with the Vocera Web Console (learn more here)  Text page via [employer] Paging/Directory     Interval History/Subjective: up in chair; daughter in room    Physical Exam/Objective:  Temp:  [97.4  F (36.3  C)-97.8  F (36.6  C)] 97.8  F (36.6  C)  Pulse:  [84-91] 87  Resp:  [18] 18  BP: (126-181)/(59-80) 157/77  SpO2:  [91 %-98 %] 95 %  Body mass index is 22.35 kg/m .    GENERAL:   Looking around; does not follow commands   HEAD:  Normocephalic, without obvious abnormality, atraumatic   EYES:  PERRL, conjunctiva/corneas clear, no scleral icterus, EOM's intact   NOSE: Nares normal, septum midline, mucosa normal, no drainage   THROAT: Lips, mucosa, and tongue normal; teeth and gums normal, mouth moist   NECK: Supple, symmetrical, trachea midline   BACK:   Symmetric, no curvature, ROM normal   LUNGS:   Clear to auscultation bilaterally, no rales, rhonchi, or wheezing, symmetric chest rise on inhalation, respirations unlabored   CHEST WALL:  No tenderness or deformity   HEART:  Regular rate and rhythm, S1 and S2 normal, no murmur, rub, or gallop    ABDOMEN:   Soft, non-tender, bowel sounds active all four quadrants, no masses, no organomegaly, no rebound or guarding   EXTREMITIES: Post op dressing not pulled   SKIN: Dry to touch, no exanthems in the visualized areas   NEURO: Baseline dementia; moves all 4 extremities freely though limited         Data reviewed today: I personally reviewed all new medications, labs, imaging/diagnostics reports over the past 24 hours. Pertinent findings include:    Imaging:   No results found for this or any previous visit (from the past 24 hour(s)).      Labs:    Most Recent 3 BMP's:  Recent Labs   Lab Test 10/29/23  0659 10/28/23  1008 10/27/23  0543    140 139   POTASSIUM 4.4 4.7 4.2   CHLORIDE 106 104 105   CO2 27 27 26   BUN 32.3* 25.8* 23.9*   CR 0.71 0.71 0.63   ANIONGAP 9 9 8   YADIRA 7.9* 8.4* 8.1*   GLC 98  98 126* 87       Medications:   Personally Reviewed.  Medications    lactated ringers 75 mL/hr at 10/28/23 0033      aspirin  81 mg Oral BID    carbidopa-levodopa  1 half-tab Oral TID    DULoxetine  60 mg Oral Daily    gabapentin  100 mg Oral TID    hydrALAZINE  10 mg Oral TID    levothyroxine  100 mcg Oral Daily    [Held by provider] losartan  50 mg Oral BID    oxyCODONE  5 mg Oral TID    polyethylene glycol  17 g Oral Daily    QUEtiapine  25 mg  Oral At Bedtime    senna-docusate  1 tablet Oral BID    sodium chloride (PF)  3 mL Intracatheter Q8H    vitamin D3  50 mcg Oral Daily

## 2023-11-01 NOTE — PLAN OF CARE
Problem: Risk for Delirium  Goal: Optimal Coping  Outcome: Progressing   Goal Outcome Evaluation:         Confused, mumbling, calm most of the shift. She did have a moment of restlessness, moaning loudly that her right leg was hurting. Gave her Tylenol with 10mg Hydroxyzine, and Tylenol 650mg. Within 45 minutes she was calm, sleeping. Receiving scheduled Oxycodone 5mg THREE TIMES DAILY.

## 2023-11-01 NOTE — PROGRESS NOTES
"Orthopedic Progress Note      Assessment: 4 Days Post-Op  S/P Procedure(s):  LEFT INTERNAL FIXATION, FRACTURE, TROCHANTERIC, HIP, USING PINS OR RODS @    Plan:   - Continue PT/OT.   - Weightbearing status: TTWB with walker   - Anticoagulation: ASA in addition to SCDs, vicente stockings and early ambulation.  - Discharge planning: TCU  - Dressing intact     Subjective:  Pain: Well controlled on oral meds   Nausea, Vomiting:  No  Chest pain: No  Lightheadedness, Dizziness:  No  Neuro:  Patient denies new onset numbness or paresthesias     Patient doing well on POD #4. Sleepy this morning and redirectable     Objective:  BP (!) 196/90 (BP Location: Right arm)   Pulse 80   Temp 98.1  F (36.7  C) (Oral)   Resp 16   Ht 1.676 m (5' 6\")   Wt 62.8 kg (138 lb 7.2 oz)   SpO2 92%   BMI 22.35 kg/m    The patient is A&Ox3. Appears comfortable, sitting up at bedside.  Calves are soft and non-tender bilaterally  Brisk capillary refill in the toes.   Palpable left dorsalis pedis pulse. Foot warm & well-perfused.  Sensation is intact to light touch & equal bilaterally in the femoral, DP, SP & tibial nerve distributions.  ROM: Flexes at left hip. Appropriately flexes & extends all toes bilaterally.   Motor: +5/5 dorsiflexion, plantar flexion & EHL bilaterally. Fires quad.   Leg lengths equal.  left hip dressing C/D/I without strikethrough, no surrounding erythema.       5/5 TA/GSC/EHL.         Pertinent Labs   Lab Results: personally reviewed.   Lab Results   Component Value Date    INR 1.04 10/28/2023    INR 1.04 12/24/2020     Lab Results   Component Value Date    WBC 8.1 10/27/2023    HGB 8.4 (L) 11/01/2023    HCT 32.0 (L) 10/27/2023     (H) 10/27/2023     10/27/2023     Lab Results   Component Value Date     10/29/2023    CO2 27 10/29/2023         Report completed by:  Sowmya Wallace PA-C/Dr[facility]    11/01/23     "

## 2023-11-01 NOTE — PLAN OF CARE
Goal Outcome Evaluation:      Problem: Adult Inpatient Plan of Care  Goal: Optimal Comfort and Wellbeing  Outcome: Progressing  Intervention: Monitor Pain and Promote Comfort  Recent Flowsheet Documentation  Taken 11/1/2023 0050 by Mayda Gan, RN  Pain Management Interventions:   rest   repositioned     Problem: Adult Inpatient Plan of Care  Goal: Readiness for Transition of Care  Outcome: Progressing  Problem: Adult Inpatient Plan of Care  Goal: Absence of Hospital-Acquired Illness or Injury  Intervention: Prevent Skin Injury  Recent Flowsheet Documentation  Taken 11/1/2023 0050 by Mayda Gan, RN  Body Position:   turned   right    A& oriented to self and sometimes to place. VSS with slight elevated BP, but not within range to give PRN blood pressure meds; on RA. Purewick in place. Pt is Q2 turn.  Scheduled Tylenols was given for pain.

## 2023-11-01 NOTE — PLAN OF CARE
Pt only oriented to self. Purewick in place. Pt becomes more agitated and continually moans when painful. Pt can take pills whole with applesauce. Pt does not like pudding. Pt was up in chair for dinner and is a full assist to feed self. Regular diet.       Lanny Chavarria RN on 10/31/2023 at 10:00 PM

## 2023-11-01 NOTE — PROGRESS NOTES
"[facility]  Palliative Care Daily Progress Note       Recommendations & Counseling     GOALS OF CARE:   Goals are life prolonging with limits.  Would like to see if patient is able to participate in PT/OT and regain strength, and then revisit goals of care.  If successful then would consider TCU, if not then consider hospice.   Family is aware comfort-focused care would likely allow a more aggressive pain regimen and she may be eligible for hospice. Of note, per family patient had been on hospice a couple of years ago after a fall with fracture, then \"graduated\".    Daughter states patient was supposed to follow up with her neurologist for 6 month check in, but ended up in the hospital.  She is wondering if adjustment in her sinemet dose could possibly help with her impulsivity, hallucinations, and ultimately her fall risk.  Dr. Daley decreased dose on 10/31.   Will continue to follow.  She is followed by Dr. Pace in [facility] outpatient palliative care clinic.  Recommend follow up after discharge from hospital or TCU, if she doesn't enroll in hospice.  In addition, may need to revisit appropriate placement in the longer term as it seems her memory care may be maxed in terms of how much care they can provide for Mehreen.      ADVANCE CARE PLANNING:  Health care directive on file, with primary health care agent daughter Alysa and secondary daughter Irasema ROBERT on file, completed 6/2022 indicating her wish for no CPR and comfort focused medical treatments.  Code status: No CPR- Do NOT Intubate     MEDICAL MANAGEMENT:   Palliative care is not actively managing symptoms at this time.      PSYCHOSOCIAL/SPIRITUAL SUPPORT:   ~ 10 years ago.  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      Discussed with: Raegan ROSALES, " "Kate ROSALES Care Manager      Assessments          Mehreen Camara is a 83 year old female with a past medical history of multi-system atrophy, Lewy Body dementia, aortic stenosis, history of falls who presented on 10/26/23 with left hip pain and was found to have an acute left intertrochanteric proximal femur fracture.  She also has old compression fractures of T12, L3, L4, L5.  She underwent a right hip trochanteric nailing on 10/27.   Has been followed by outpatient palliative clinic, with last visit in 6/2023.        Today, the patient was seen for:  Goals of care              Interval History:     Chart review/discussion with unit or clinical team members:   Sinemet dose reduced by 50% on 10/31.   Has had 4 doses of oxycodone 5 mg and 1 dose of IV dilaudid in the past 24 hours.     Per patient or family/caregivers today:  Met with daughter in room while patient having physical therapy.  She was assisted to the commode, had bowel movement.  Complains of pain in her lower back and RN medicated with oxycodone.  Daughter states patient was alert, sitting up in chair when she arrived around 1:00.  Now more confused and restless.    We discussed potential options of TCU (if eligible) versus possibly her memory care facility.  Unclear if memory care could meet her care needs at this time.  Eladio wondering about care facilities that specialize in parkinson's/memory loss.  I encouraged her to discuss with care management.    Discussed with Raegan ROSALES and BOBBY Love Care Manager.              Review of Systems:     Besides above, an additional 4 point system ROS was reviewed and is unremarkable          Medications:     I have reviewed this patient's medication profile and medications during this hospitalization.             Physical Exam:   Vital Signs: Blood pressure (!) 158/67, pulse 85, temperature 98.1  F (36.7  C), temperature source Oral, resp. rate 20, height 1.676 m (5' 6\"), weight 62.8 kg (138 lb 7.2 oz), SpO2 97%, " not currently breastfeeding.   Constitutional: Lying in bed, mostly with eyes closed, picking at blanket at times.  NAD  Respiratory: Breathing nonlabored at rest  NEURO: Oriented to self only.  Moves all extremities  SKIN: Warm and dry              Data Reviewed:         Results for orders placed or performed during the hospital encounter of 10/26/23   Head CT w/o contrast    Impression    IMPRESSION:  1.  No acute intracranial injury, hemorrhage, mass, or CT evidence of recent ischemia.   Cervical spine CT w/o contrast    Impression    IMPRESSION:  1.  No acute cervical spine fracture.   CT Chest/Abdomen/Pelvis w Contrast    Impression    IMPRESSION:  1.  Comminuted, moderately displaced left intertrochanteric femur fracture. No additional acute traumatic abnormality in the chest, abdomen or pelvis with spine reported separately.  2.  Subacute right anterior 2nd - 3rd and left 3rd - 6th rib fractures.   3.  Additional chronic details in the findings.   CT Lumbar Spine Reconstructed    Impression    IMPRESSION:  1.  No acute lumbar spine fracture.  2.  Chronic T12, L3, L4 and L5 endplate deformities.                 CT Thoracic Spine Reconstructed    Impression    IMPRESSION:  1.  No acute thoracic spine fracture.  2.  Chronic T12 fracture.             XR Femur Left 2 Views    Impression    IMPRESSION: Displaced varus angulated intertrochanteric left proximal femur fracture with separate lesser trochanteric avulsive fracture fragment. No dislocation of the left femoral head from the acetabulum. Mild left hip osteoarthritis with diffuse bone   demineralization and partial visualization of a right hip arthroplasty. Chronic healed superior and inferior pubic rami fractures on each side. Partially seen. Vascular stenting in the iliac arteries. Left total knee arthroplasty with patellar   resurfacing. Mid to distal left femur appears intact. Arterial calcification. No appreciable left knee joint effusion.    NOTE:  ABNORMAL REPORT    THE DICTATION ABOVE DESCRIBES AN ABNORMALITY FOR WHICH FOLLOW-UP IS NEEDED.    XR Tibia and Fibula Left 2 Views    Impression    IMPRESSION: Anatomic alignment left tibia and fibula. No acute displaced tibia or fibula fracture. Diffuse bone demineralization. Left total knee arthroplasty with patellar resurfacing. No sizable left knee joint effusion. Arterial calcification.   Partially seen fractures at the distal aspects of the left fourth and fifth metatarsals with degenerative change in the left foot.   XR Pelvis 1/2 Views    Impression    IMPRESSION:   New displaced varus angulated left intertrochanteric proximal femur fracture. Separate lesser trochanteric avulsive fracture fragment. No definitive dislocation of the left femoral head from the acetabulum. Chronic healed fracture deformities at both   superior and inferior pubic rami. Right total hip arthroplasty is partially seen. No acute displaced pelvic fracture is identified. Endovascular stenting redemonstrated abdominal aorta and iliac arteries. Degenerative change lower lumbar spine.    NOTE: ABNORMAL REPORT    THE DICTATION ABOVE DESCRIBES AN ABNORMALITY FOR WHICH FOLLOW-UP IS NEEDED.    XR Foot Left 2 Views    Impression    IMPRESSION:   No good AP view of the foot is provided. There does appear to be irregularity of the distal aspect of the fifth metatarsal but repeat views are recommended.   XR Femur Left 2 Views    Impression    IMPRESSION: Interval repair of the left femur fracture with intramedullary antelmo and dynamic screw. Fracture alignment is near anatomic. Old bilateral pubic rami fractures. Right hip arthroplasty.   XR Pelvis Port 1/2 Views    Impression    IMPRESSION: Interval repair of the left femur fracture with intramedullary antelmo and dynamic screw. Fracture alignment is near anatomic. Old bilateral pubic rami fractures. Right hip arthroplasty.   Echocardiogram Complete   Result Value Ref Range    LVEF  >70%      Lab  Results   Component Value Date    ALBUMIN 3.8 10/26/2023    ALBUMIN 4.3 11/07/2022    ALBUMIN 3.1 12/24/2020                  ====================================================  TT: I have personally spent a total of 50 minutes on the day of the encounter on the unit in review of medical record, consultation with the medical providers and assessment of patient today, with time spent in counseling, coordination of care, and discussion with patient and family re: diagnostic results, prognosis, symptom assessment, risks and benefits of management options, and development of plan of care as noted above.      ====================================================    Alysa Bar, CNS  [facility], Palliative Care  Securely message with the [employer] Web Console (learn more here) or  Text page via [employer] Paging/Directory

## 2023-11-01 NOTE — PROGRESS NOTES
[facility] MEDICINE PROGRESS NOTE      Summary: 83 year old female into [facility] due to recurrent mechanical  Falls. Pt arrived with left hip pain.  Diagnostics confirmed a left hip fracture.     Hospital course is marked by clinical support for her acute left hip  Fracture with continuation of her scheduled oxycodone normally  Taken for compression fractures.   She was taken to OR on 10/27  For an ORIF of the left hip.  Post op she has been slow to re-engage.    Today she is up in the chair looking around. She does not engage.  When I leave the room and observe she picks at her blankets; knitting  On them.          Hospital day number 6    Problem list:     POD #5 left hip ORIF: PT tries to work with her though difficult for  A full session  HTN, chronic essential:  hydralazine 10 mg 3 times daily, resume  Losartan 50 mg once daily; increase if needed  3.   Mechanical falls:  likely to LTC (different than original plan)  4.   Compression fractures; lumbar and thoracic: pain control;   5.   Aortic stenosis: echo reviewed; cardiology input appreciated;   Outpatient follow up  6.  History of MSA; lewy body dementia: sinemet cut in half yesterday;   She tolerates; she talks a little though very little; follows a   Few simple commands but only a few  7.  Anemia, due to acute blood loss: stable; transfuse at less than 7 (7.9)  8.  dvt prevention:  baby aspirin twice daily  9.  Disposition:  unclear; if she shows some rehab potential then to [facility]      Romana Daley MD  [facility]  [facility]  Phone: #[phone]  Securely message with the [employer]oc[employer] Web Console (learn more here)  Text page via [employer] Paging/Directory     Interval History/Subjective: resting in bed (no distress); pt ate with   Assist this morning; see above    Physical Exam/Objective:  Temp:  [98.1  F (36.7  C)-99  F (37.2  C)] 98.1  F (36.7  C)  Pulse:  [80-93] 80  Resp:  [15-20]  16  BP: (106-196)/(59-90) 196/90  SpO2:  [92 %-97 %] 92 %  Body mass index is 22.35 kg/m .    GENERAL:  Resting; no distress   HEAD:  Normocephalic, without obvious abnormality, atraumatic   EYES:  PERRL, conjunctiva/corneas clear, no scleral icterus, EOM's intact   NOSE: Nares normal, septum midline, mucosa normal, no drainage   THROAT: Lips, mucosa, and tongue normal; teeth and gums normal, mouth moist   NECK: Supple, symmetrical, trachea midline   BACK:   Symmetric, no curvature, ROM normal   LUNGS:   Clear to auscultation bilaterally, no rales, rhonchi, or wheezing, symmetric chest rise on inhalation, respirations unlabored   CHEST WALL:  No tenderness or deformity   HEART:  Regular rate and rhythm, S1 and S2 normal, no murmur, rub, or gallop    ABDOMEN:   Soft, non-tender, bowel sounds active all four quadrants, no masses, no organomegaly, no rebound or guarding   EXTREMITIES: Post op dressing not pulled   SKIN: Dry to touch, no exanthems in the visualized areas   NEURO: Baseline dementia; moves all 4 extremities freely though limited         Data reviewed today: I personally reviewed all new medications, labs, imaging/diagnostics reports over the past 24 hours. Pertinent findings include:    Imaging:   No results found for this or any previous visit (from the past 24 hour(s)).      Labs:    Most Recent 3 BMP's:  Recent Labs   Lab Test 10/29/23  0659 10/28/23  1008 10/27/23  0543    140 139   POTASSIUM 4.4 4.7 4.2   CHLORIDE 106 104 105   CO2 27 27 26   BUN 32.3* 25.8* 23.9*   CR 0.71 0.71 0.63   ANIONGAP 9 9 8   YADIRA 7.9* 8.4* 8.1*   GLC 98  98 126* 87       Medications:   Personally Reviewed.  Medications    lactated ringers 75 mL/hr at 10/28/23 0033      aspirin  81 mg Oral BID    carbidopa-levodopa  1 half-tab Oral TID    DULoxetine  60 mg Oral Daily    gabapentin  100 mg Oral TID    hydrALAZINE  10 mg Oral TID    levothyroxine  100 mcg Oral Daily    losartan  50 mg Oral Daily    oxyCODONE  5 mg Oral  TID    polyethylene glycol  17 g Oral Daily    QUEtiapine  25 mg Oral At Bedtime    senna-docusate  1 tablet Oral BID    sodium chloride (PF)  3 mL Intracatheter Q8H    vitamin D3  50 mcg Oral Daily

## 2023-11-02 NOTE — PLAN OF CARE
Problem: Adult Inpatient Plan of Care  Goal: Absence of Hospital-Acquired Illness or Injury  Outcome: Progressing  Intervention: Identify and Manage Fall Risk  Recent Flowsheet Documentation  Taken 11/1/2023 1625 by Ava Núñez RN  Safety Promotion/Fall Prevention:   safety round/check completed   clutter free environment maintained  Intervention: Prevent Skin Injury  Recent Flowsheet Documentation  Taken 11/1/2023 1625 by Ava Núñez RN  Body Position:   turned   weight shifting   supine, head elevated  Intervention: Prevent and Manage VTE (Venous Thromboembolism) Risk  Recent Flowsheet Documentation  Taken 11/1/2023 1625 by Ava Núñez RN  VTE Prevention/Management: SCDs (sequential compression devices) on  Goal: Optimal Comfort and Wellbeing  Outcome: Progressing  Intervention: Monitor Pain and Promote Comfort  Recent Flowsheet Documentation  Taken 11/1/2023 1835 by Ava Núñez RN  Pain Management Interventions:   medication (see MAR)   repositioned   rest     Problem: Pain Acute  Goal: Optimal Pain Control and Function  Outcome: Progressing  Intervention: Develop Pain Management Plan  Recent Flowsheet Documentation  Taken 11/1/2023 1835 by Ava Núñez RN  Pain Management Interventions:   medication (see MAR)   repositioned   rest  Intervention: Prevent or Manage Pain  Recent Flowsheet Documentation  Taken 11/1/2023 1625 by Ava Núñez RN  Medication Review/Management: medications reviewed     Goal Outcome Evaluation:    Pt is alert and oriented to self and doesn't calls appropriately for needs. Heavy Assist of 2 with ambulation. Vitals signs are stable, afebrile, oxygen on room air. Pt is tolerating regular diet.  Pt is bowel and bladder incontinent.  Pt complains of pain, to abdomen, PRN tylenol and schedule oxycodone given for this, with relief. Alarms in place.      Plan: Pt will discharge back to TCU        Ava Núñez RN  November 2, 2023, 12:13 AM

## 2023-11-02 NOTE — PROGRESS NOTES
ASSESSMENT:  SPIRITUAL HEALTH SERVICES Progress Note    Saw pt Mehreen Camara and offered [employer] sacrament of anointing for the healing of the sick.     Fr. Bentley White

## 2023-11-02 NOTE — PROGRESS NOTES
[facility]    Medicine Progress Note - Hospitalist Service    Date of Admission:  10/26/2023    Assessment & Plan   83-year-old with history of chronic dementia, osteoporosis with previous compression fractures of T12, L3, L4 and L5, memory care resident admitted for recurrent falls and pain of left hip.  Diagnosed with left hip fracture intertrochanteric proximal fracture.  [place] [facility]opedic performed left internal fixation on 10/27/2023.  Developed postoperative anemia which required transfusion and has been stable.  After meeting with palliative care provider today patient has elected for comfort care.    Left hip fracture  Status post left internal fixation on 10/27.  Appreciate orthopedic recommendations.  Toe-touch weightbearing with walker  Postop VTE prophylaxis per orthopedics.  Pain management per palliative care.    Essential hypertension  PTA medication: Losartan 50 mg BID.    Osteoporosis  Multiple compression fractures of lumbar and thoracic spine.  Continue supportive care, and pain management.    Aortic stenosis  Moderate aortic stenosis on echocardiogram.  Regular follow-up with cardiology for monitoring.    Lewy body dementia.  PTA medication: Sinemet, Seroquel 25 mg at bedtime.    Postoperative anemia secondary acute blood loss  Transfusion for less than 7 g/dL.  No indication for transfusion.        Diet: Advance Diet as Tolerated: Regular Diet Adult    DVT Prophylaxis: ASA BID per orthopedics.  Diaz Catheter: Not present  Lines: None     Cardiac Monitoring: None  Code Status: No CPR- Do NOT Intubate      Clinically Significant Risk Factors                  # Hypertension: Noted on problem list                   Disposition Plan     Expected Discharge Date: 11/02/2023      Destination: assisted living  Discharge Comments: Surgery 10/27 1800  1:1 sitter --> off sitting on 10/29            Chris Stephenson DO  Hospitalist Service  [facility]  Hospital  Securely message with [employer] (more info)  Text page via [facility] Paging/Directory   ______________________________________________________________________    Interval History   No new complaints.  Sitting up eating breakfast.     Physical Exam   Vital Signs: Temp: 97.1  F (36.2  C) Temp src: Oral BP: 131/63 Pulse: 81   Resp: 14 SpO2: 94 % O2 Device: None (Room air)    Weight: 138 lbs 7.18 oz    GENERAL:  Resting; no distress   HEAD:  Normocephalic, without obvious abnormality, atraumatic   EYES:  PERRL, conjunctiva/corneas clear, no scleral icterus, EOM's intact   NECK: Supple, symmetrical, trachea midline   LUNGS:   Clear to auscultation bilaterally, no rales, rhonchi, or wheezing, symmetric chest rise on inhalation, respirations unlabored   HEART:  Regular rate and rhythm, S1 and S2 normal, no murmur, rub, or gallop    ABDOMEN:   Soft, non-tender, bowel sounds active all four quadrants, no masses, no organomegaly, no rebound or guarding   EXTREMITIES: Post op dressing not pulled   SKIN: Dry to touch, no exanthems in the visualized areas   NEURO: Baseline dementia; moves all 4 extremities freely though limited       Medical Decision Making       40 MINUTES SPENT BY ME on the date of service doing chart review, history, exam, documentation & further activities per the note.      Data     I have personally reviewed the following data over the past 24 hrs:    N/A  \   7.7 (L)   / N/A     N/A N/A N/A /  N/A   N/A N/A N/A \       Imaging results reviewed over the past 24 hrs:   No results found for this or any previous visit (from the past 24 hour(s)).

## 2023-11-02 NOTE — PROGRESS NOTES
Care Management Follow Up    Length of Stay (days): 7    Expected Discharge Date: 11/03/2023     Concerns to be Addressed: discharge planning     Patient plan of care discussed at interdisciplinary rounds: Yes    Anticipated Discharge Disposition: Assisted Living, Transitional Care     Anticipated Discharge Services: None  Anticipated Discharge DME: None    Patient/family educated on Medicare website which has current facility and service quality ratings: no  Education Provided on the Discharge Plan: Yes  Patient/Family in Agreement with the Plan: yes    Additional Information:  CM called the Pt's custodial and got voicemail. CM met with pt and with the pt's dtr in the AM. Pt was very sleepy and dtr stated that the pt appeared to be more confused and sleepy today. Dtr stated that she has spoke with [facility] hospice yesterday and would like to see how the pt would do with PT later in the day. Dtr stated that pending how the pt does today it would determine if she would be more interested in TCU vs LTC with Hospice. CM and Dtr discussed different options for cares. Dtr stated that she would like to reach out to [facility] and to [facility] to see if they have any LTC beds if this is the route they decide for the pt vs back to [facility] with Hospice if they are able to meet the pt's needs. Referrals made as requested. Dtr also accepted a list of placements and wanted to review them as well.     CM was updated that family would like to transition to comfort care focus. CM to continue to reach out to [facility] and to LTC placements as needed.     NAM Munroe

## 2023-11-02 NOTE — PROGRESS NOTES
CLINICAL NUTRITION SERVICES    Noted pt on comfort cares. RD will not sign on at this time. Please place consult if nutrition concerns arise.

## 2023-11-02 NOTE — PLAN OF CARE
Goal Outcome Evaluation:      Problem: Adult Inpatient Plan of Care  Goal: Absence of Hospital-Acquired Illness or Injury  Intervention: Prevent Skin Injury  Recent Flowsheet Documentation  Taken 11/2/2023 0330 by Mayda Gan, RN  Body Position:   turned   right  Taken 11/2/2023 0135 by Mayda Gan, RN  Body Position:   turned   left     Problem: Adult Inpatient Plan of Care  Goal: Optimal Comfort and Wellbeing  Intervention: Monitor Pain and Promote Comfort     Problem: Orthopaedic Fracture  Goal: Optimal Pain Control and Function  Intervention: Manage Acute Orthopaedic-Related Pain       A&O to self only. VSS, on RA. CMS intact. Dressing had scant drainage that was marked. PRN Atarax and Oxycodone PO given for pain control. Pt is Q2 turn. Bed alarm activated for safety.

## 2023-11-02 NOTE — PROGRESS NOTES
"Orthopedic Progress Note      Assessment: 5 Days Post-Op  S/P Procedure(s):  LEFT INTERNAL FIXATION, FRACTURE, TROCHANTERIC, HIP, USING PINS OR RODS @    Plan:   - Continue PT/OT.   - Weightbearing status: TTWB with walker   - Anticoagulation: ASA in addition to SCDs, vicente stockings and early ambulation.  - Discharge planning: TCU  - Dressing intact     Subjective:  Pain: Well controlled on oral meds   Nausea, Vomiting:  No  Chest pain: No  Lightheadedness, Dizziness:  No  Neuro:  Patient denies new onset numbness or paresthesias     Patient doing well on POD #5. Sleepy this morning and redirectable.      Objective:  /63 (BP Location: Right arm)   Pulse 81   Temp 97.1  F (36.2  C) (Oral)   Resp 14   Ht 1.676 m (5' 6\")   Wt 62.8 kg (138 lb 7.2 oz)   SpO2 94%   BMI 22.35 kg/m    The patient is A&Ox3. Appears comfortable, sitting up at bedside.  Calves are soft and non-tender bilaterally  Brisk capillary refill in the toes.   Palpable left dorsalis pedis pulse. Foot warm & well-perfused.  Sensation is intact to light touch & equal bilaterally in the femoral, DP, SP & tibial nerve distributions.  ROM: Flexes at left hip. Appropriately flexes & extends all toes bilaterally.   Motor: +5/5 dorsiflexion, plantar flexion & EHL bilaterally. Fires quad.   Leg lengths equal.  left hip dressing C/D/I without strikethrough, no surrounding erythema.       5/5 TA/GSC/EHL.         Pertinent Labs   Lab Results: personally reviewed.   Lab Results   Component Value Date    INR 1.04 10/28/2023    INR 1.04 12/24/2020     Lab Results   Component Value Date    WBC 8.1 10/27/2023    HGB 7.7 (L) 11/02/2023    HCT 32.0 (L) 10/27/2023     (H) 10/27/2023     10/27/2023     Lab Results   Component Value Date     10/29/2023    CO2 27 10/29/2023         Report completed by:  Kailyn Carr PA-C/Dr[facility]    11/02/23     "

## 2023-11-02 NOTE — CONSULTS
SPIRITUAL HEALTH SERVICES Consult Note          Referral Source/Reason for Visit: Consult order for sacramental needs              Summary and Recommendations -   Pt was very lethargic and non responsive when writer checked in, Writer made request to nani zhu to visit.     PLAN: [facility] will cont to assess and support through RONA Lucero M.Div., [facility]  Staff    [phone]   Spiritual Health Services is available 24/7 for emergent requests and consults, either by paging the on-call  or by entering an ASAP/STAT consult in [employer], which will also page the on-call .

## 2023-11-02 NOTE — PLAN OF CARE
Goal Outcome Evaluation:  About an hour after receiving her AM oral dose of Oxycodone 5mg, while daughter was visiting, pt began gentle voicing of discomfort, so she was given Tylenol 650mg PO, She continued to be calm for about 40 minutes, but suddenly cried out in pain, indicating her lower back (site of her chronic pain), and grew rapidly more distressed, yelling loudly and inconsolable. Palliative Care happened to be visiting with  Mehreen and daughter Christine, who was accepting the transition to Comfort Cares, and new orders for stat high concentration of liquid Oxycodone, then Haldol, were placed and these meds were given. About 30 mins later, pt was more calm. She was able to take a few bites of her burger (holding it herself, but with continual assist from NA), and several sips of water. Spiritual Care was consulted, see notes. Pt remains calm and cooperative at this writing.

## 2023-11-02 NOTE — PROGRESS NOTES
PALLIATIVE CARE PROGRESS NOTE  [facility]     Patient Name: Mehreen Orellanaitbarshadia  Date of Admission: 10/26/2023   Today the patient was seen for: symptom management and goals of care     Recommendations & Counseling     GOALS OF CARE:   Goals transitioned to comfort cares.    Daughter, Eladio, agrees with comfort-focused care allowing more aggressive pain regimen and symptom management including management of delirium and restlessness and agitation.    Daughter interested in involving hospice at discharge.      ADVANCE CARE PLANNING:  Health care directive on file, with primary health care agent daughter Alysa and secondary daughter Irasema ROBERT on file, completed 6/2022 indicating her wish for no CPR and comfort focused medical treatments.  Code status: No CPR- Do NOT Intubate     MEDICAL MANAGEMENT:   Comfort Care   Below are common symptoms routinely seen in patients with comfort focused goals and at the end of life. This patient may not currently exhibit all of these symptoms; however, if these were to occur our recommendations are as follows:     #Pain -thoracic and lumbar spine and right hip  Oxycodone concentrate increased frequency of 5 mg every 8 to every 6   Oxycodone concentrate SL 5-10 q 1 hour as needed     #Air hunger/Dyspnea   Oxycodone concentrate increased frequency of 5 mg every 8 to every 6   Oxycodone concentrate SL 5-10 q 1 hour as needed     #Secretion burden   Robinul 0.2 mg  q 4 hours as needed. If ineffective, increase up to 0.3 mg   Consider atropine if Robinul ineffective after dose increase      #Nausea   Zofran 4 mg q 6 hours as needed. Can increase to 8 mg   Zyprexa 5 mg q 6 hours as needed     #Agitation  Aggressive symptoms control first.   Since patient exhibiting difficulty swallowing pills, will try Haldol concentrate 2 mg SL every 4 hours as needed.  Seroquel 12.5 mg 3 times daily as needed and 25 mg scheduled at bedtime  Consider Zyprexa 5 mg ODT or  "IM.  Hold off on shot for now  Increase dose of Zyprexa to 10 mg.      PSYCHOSOCIAL/SPIRITUAL SUPPORT:   ~ 10 years ago.  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     Addendum 1510: Returned to reassess patient and comfort level.  Much calmer after dose of Haldol and medications for pain control.  Daughter relieved that mother more comfortable.  Support given to her.  She agrees with trial of dissolvable tablet (Zyprexa) or sublingual medication (Haldol) to help ease restlessness and agitation should it present itself.    Palliative Care will continue to follow. Thank you for the consult and allowing us to aid in the care of Mehreen Camara.    These recommendations have been discussed with Dr. Stephenson and Lurdes Parra RN.    HAILEE Avila, CNS, AOCNS  Securely message with [employer] (more info)  Text page via [facility] Paging/Directory         Assessments           Mehreen Camara is a 83 year old female with a past medical history of multi-system atrophy, Lewy Body dementia, aortic stenosis, history of falls who presented on 10/26/23 with left hip pain and was found to have an acute left intertrochanteric proximal femur fracture.  She also has old compression fractures of T12, L3, L4, L5.  She underwent a right hip trochanteric nailing on 10/27.   Has been followed by outpatient palliative clinic, with last visit in 6/2023      Prognosis, Goals, & Planning:   Prognosis, Goals, and/or Advance Care Planning addressed today:  Education provided on transition to comfort-focused goals of care would be including discontinuation of IV fluids, cardiac monitoring, labs, tube feeding, TPN and other interventions that do not directly promote comfort.  Anticipatory guidance was given regarding feeding, hunger, fluids at end of " life. We discussed utilization of medications to ease air hunger, agitation and restlessness at the time that ventilator is compassionately withdrawn.  Discussed that this process is very purposeful in terms of ensuring patient is as comfortable as possible and that family wishes are honored.  Daughter Eladio agrees with transitioning to comfort focused cares and maximizing those medications to promote comfort and a sense of calm for the patient.  Palliative Performance Score:     20%- 1. Totally bed bound; 2. Unable to do any activity, extensive disease; 3. Total care; 4. Minimal to sips; 5. Full or drowsy +/- confusion       Coping, Meaning, & Spirituality:   Mood, coping, and/or meaning in the context of serious illness were addressed today: Yes         Interval History:     Chart review/discussion with unit or clinical team members:   Visibly distressed and hollering out during visit.  Afebrile.      Review of Systems:   Unable to assess is unable to communicate with patient.  ROS was reviewed.            Physical Exam:   Temp:  [97.1  F (36.2  C)-97.9  F (36.6  C)] 97.1  F (36.2  C)  Pulse:  [76-85] 81  Resp:  [14-16] 14  BP: (123-175)/(59-90) 131/63  SpO2:  [92 %-95 %] 94 %  138 lbs 7.18 oz    General appearance: alert, delirious, and moderate distress  Head: Normocephalic, without obvious abnormality, atraumatic  Eyes: Lids and lashes normal.  Sclera anicteric.  Nose: no discharge  Throat: Lips without lesions, oral mucosa moist.  Lungs: clear to auscultation bilaterally  Heart: Regular rate and rhythm  Abdomen: Soft, nondistended  Extremities: No cyanosis or edema noted  Neurologic: Patient visibly distressed and yelling out.           Current Problem List:   Principal Problem:    Closed fracture of multiple ribs of both sides, initial encounter  Active Problems:    Falls frequently    Closed nondisplaced intertrochanteric fracture of left femur, initial encounter (H)    Displaced intertrochanteric fracture  of left femur, initial encounter for closed fracture (H)      Allergies   Allergen Reactions    Penicillins Anaphylaxis, Rash and Shortness Of Breath    Aspirin Nausea and GI Disturbance    Atenolol Cough    Atorvastatin Muscle Pain (Myalgia) and Nausea and Vomiting    Bupropion Nausea    Cephalexin Rash     Localized to neck area    Clindamycin Other (See Comments)     Constipation     Codeine Nausea    Ibuprofen Nausea and GI Disturbance    Lovastatin Muscle Pain (Myalgia)            Data Reviewed:     Results for orders placed or performed during the hospital encounter of 10/26/23 (from the past 24 hour(s))   Hemoglobin   Result Value Ref Range    Hemoglobin 7.7 (L) 11.7 - 15.7 g/dL     *Note: Due to a large number of results and/or encounters for the requested time period, some results have not been displayed. A complete set of results can be found in Results Review.        50 MINUTES SPENT BY ME on the date of service doing chart review, history, exam, documentation & further activities per the note.

## 2023-11-03 NOTE — PROGRESS NOTES
Care Management Follow Up    Length of Stay (days): 8    Expected Discharge Date: 11/6/2023     Concerns to be Addressed: discharge planning     Patient plan of care discussed at interdisciplinary rounds: Yes    Anticipated Discharge Disposition: Assisted Living, Transitional Care     Anticipated Discharge Services: None  Anticipated Discharge DME: None    Patient/family educated on Medicare website which has current facility and service quality ratings: no  Education Provided on the Discharge Plan: Yes  Patient/Family in Agreement with the Plan: yes    Additional Information:  CM met with [facility] who met with the dtr Eladio with the pt. CM asked Sharla if she knew of any possible placements. Sharla says that per their team they have beds at [facility] ([facility]).     CM met with Dtoscar Hendricks and pt. Dtr Eladio said that the CM can send a referral to [facility]. Referral made as requested. Dtr still interested in [facility] if they have beds as well. Dtr said that at this time she would like to try these referrals and go from there.     CM had a call back from [facility] who is currently reviewing the referral and will follow up, currently admissions would be out until next week.     NAM Munroe

## 2023-11-03 NOTE — PROGRESS NOTES
[facility]    Medicine Progress Note - Hospitalist Service    Date of Admission:  10/26/2023    Assessment & Plan   83-year-old with history of chronic dementia, osteoporosis with previous compression fractures of T12, L3, L4 and L5, memory care resident admitted for recurrent falls and pain of left hip.  Diagnosed with left hip fracture intertrochanteric proximal fracture.  [place] orthopedic performed left internal fixation on 10/27/2023.  Developed postoperative anemia which required transfusion and has been stable.  After meeting with palliative care provider today patient has elected for comfort care on 11/2/23.    Palliative care enounter  Left hip fracture  Status post left internal fixation on 10/27.  Appreciate orthopedic recommendations.  Toe-touch weightbearing with walker  Activity as tolerated.   Pain management per palliative care.    Essential hypertension  PTA medication: Losartan 50 mg daily.    Osteoporosis  Multiple compression fractures of lumbar and thoracic spine.  Continue supportive care, and pain management.    Aortic stenosis  Moderate aortic stenosis on echocardiogram.  Regular follow-up with cardiology for monitoring.    Lewy body dementia.  PTA medication: Sinemet.   Gabapentin 100 mg TID  Olanzapine as needed.   Haldol as needed.     Postoperative anemia secondary acute blood loss  No indication for transfusion  Patient is comfort cares only.           Diet: Advance Diet as Tolerated: Regular Diet Adult    DVT Prophylaxis: ASA  Diaz Catheter: Not present  Lines: None     Cardiac Monitoring: None  Code Status: No CPR- Do NOT Intubate      Clinically Significant Risk Factors                  # Hypertension: Noted on problem list                   Disposition Plan      Expected Discharge Date: 11/04/2023      Destination: assisted living  Discharge Comments: pending hospice placement            Chris Stephenson DO  Hospitalist Service  [facility]  [facility]  Securely message with [employer] (more info)  Text page via [employer] Paging/Directory   ______________________________________________________________________    Interval History   Patient appears comfortable.  Nonverbal.     Physical Exam   Vital Signs: Temp: 97.5  F (36.4  C) Temp src: Oral BP: 101/61 Pulse: 87   Resp: 16 SpO2: 94 % O2 Device: None (Room air)    Weight: 138 lbs 7.18 oz    eneral appearance: sleeping.  Head: Normocephalic, without obvious abnormality, atraumatic  Eyes: Lids and lashes normal.    Nose: no discharge  Throat: Lips without lesions, oral mucosa moist.  Lungs: clear to auscultation bilaterally  Heart: Regular rate and rhythm, 2/6 systolic murmur.   Abdomen: Soft, nondistended  Extremities: No cyanosis or edema noted  Neurologic: calm.     Medical Decision Making       40 MINUTES SPENT BY ME on the date of service doing chart review, history, exam, documentation & further activities per the note.      Data         Imaging results reviewed over the past 24 hrs:   No results found for this or any previous visit (from the past 24 hour(s)).

## 2023-11-03 NOTE — PLAN OF CARE
Goal Outcome Evaluation:  Pt is alert to self and sometimes recognizes family members. Pt is unable to say where she is or what her situation is (baseline) Pt lived at a Memory care facility where she experienced several falls over the last several weeks. Pt has broken ribs from one of those falls, and the most recent fall resulted in a broken hip (L). The hip was repaired with an ORIF procedure. When pa is awake her daughter is able to communicate with her and determine where approximately the pain is. Pt slept the vast majority of my shift. She did awaken briefly in the afternoon in time to take some of her scheduled meds and a prn for pain. Q2 turns, air mattress requested.

## 2023-11-03 NOTE — PROGRESS NOTES
PALLIATIVE CARE PROGRESS NOTE  [facility]     Patient Name: Mehreen Camara  Date of Admission: 10/26/2023   Today the patient was seen for: goals of care and symptom management     Recommendations & Counseling     GOALS OF CARE:   Continue comfort cares.    Continue to titrate medications for pain,men and symptom management including management of delirium and restlessness and agitation.    Daughter interested in involving hospice at discharge.      ADVANCE CARE PLANNING:  Health care directive on file, with primary health care agent daughter Alysa and secondary daughter Irasema ROBERT on file, completed 6/2022 indicating her wish for no CPR and comfort focused medical treatments.  Code status: No CPR- Do NOT Intubate     MEDICAL MANAGEMENT:   Comfort Care   Below are common symptoms routinely seen in patients with comfort focused goals and at the end of life. This patient may not currently exhibit all of these symptoms; however, if these were to occur our recommendations are as follows:      #Pain -thoracic and lumbar spine and right hip  Oxycodone concentrate increase frequency of 5 mg from q6h to q4h.  Oxycodone concentrate SL 5-10 q 1 hour as needed      #Air hunger/Dyspnea   Oxycodone concentrate increased frequency of 5 mg every 8 to every 6   Oxycodone concentrate SL 5-10 q 1 hour as needed      #Secretion burden   Robinul 0.2 mg  q 4 hours as needed. If ineffective, increase up to 0.3 mg   Consider atropine if Robinul ineffective after dose increase      #Nausea   Zofran 4 mg q 6 hours as needed. Can increase to 8 mg   Zyprexa 5 mg q 6 hours as needed      #Agitation  Aggressive symptoms control first.   Since patient exhibiting difficulty swallowing pills, will try Haldol concentrate 2 mg SL every 4 hours as needed.  Discontinue Seroquel   Add Zyprexa 5 mg ODT BID and q6h prn. Will avoid IM.    Consider increase dose of Zyprexa to 10 mg.      PSYCHOSOCIAL/SPIRITUAL  "SUPPORT:   ~ 10 years ago.  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     Palliative Care will continue to follow. Thank you for the consult and allowing us to aid in the care of Mehreen Camara.    These recommendations have been discussed with Jan Grajeda, care management, Partha Mane RN and Dr. Stephenson.    LUMA Booker APRN, CNS, AOCNS  Securely message with [employer] (more info)  Text page via [facility] Paging/Directory         Assessments           Mehreen Camara is a 83 year old female with a past medical history of multi-system atrophy, Lewy Body dementia, aortic stenosis, history of falls who presented on 10/26/23 with left hip pain and was found to have an acute left intertrochanteric proximal femur fracture.  She also has old compression fractures of T12, L3, L4, L5.  She underwent a right hip trochanteric nailing on 10/27.   Has been followed by outpatient palliative clinic, with last visit in 6/2023       Prognosis, Goals, & Planning:   Prognosis, Goals, and/or Advance Care Planning addressed today:  Continue comfort cares and medication titration for pain control.  Assisted with completing OLP application with care management.  Palliative Performance Score:     20%- 1. Totally bed bound; 2. Unable to do any activity, extensive disease; 3. Total care; 4. Minimal to sips; 5. Full or drowsy +/- confusion     Coping, Meaning, & Spirituality:   Mood, coping, and/or meaning in the context of serious illness were addressed today: Yes         Interval History:     Chart review/discussion with unit or clinical team members:   Intermittently restless overnight and into early morning.  More comfortable per documentation with Zyprexa and oxycodone concentrate.  Currently in no distress and sleeping peacefully.           Review " of Systems:   Unable to assess is unable to communicate with patient.  ROS was reviewed.  Constipation: BM 11/2/2023           Physical Exam:   Temp:  [97.5  F (36.4  C)] 97.5  F (36.4  C)  Pulse:  [81-87] 87  Resp:  [14-16] 16  BP: (101-137)/(61-68) 101/61  SpO2:  [94 %] 94 %  138 lbs 7.18 oz    General appearance: fatigued, no distress, and sleeping  Head: Normocephalic, without obvious abnormality, atraumatic  Eyes: lids and lashes normal, sclera anicteric  Nose: no discharge  Throat: lips without lesions, oral mucosa moist  Lungs: non-labored, no accessory muscle ise noted  Extremities: no cyanosis, warm  Neurologic: delirious when awake, currently calm, sleeping           Current Problem List:   Principal Problem:    Closed fracture of multiple ribs of both sides, initial encounter  Active Problems:    Falls frequently    Closed nondisplaced intertrochanteric fracture of left femur, initial encounter (H)    Displaced intertrochanteric fracture of left femur, initial encounter for closed fracture (H)      Allergies   Allergen Reactions    Penicillins Anaphylaxis, Rash and Shortness Of Breath    Aspirin Nausea and GI Disturbance    Atenolol Cough    Atorvastatin Muscle Pain (Myalgia) and Nausea and Vomiting    Bupropion Nausea    Cephalexin Rash     Localized to neck area    Clindamycin Other (See Comments)     Constipation     Codeine Nausea    Ibuprofen Nausea and GI Disturbance    Lovastatin Muscle Pain (Myalgia)            Data Reviewed:   No results found. However, due to the size of the patient record, not all encounters were searched. Please check Results Review for a complete set of results.     40 MINUTES SPENT BY ME on the date of service doing chart review, history, exam, documentation & further activities per the note.

## 2023-11-03 NOTE — PLAN OF CARE
Problem: Pain Acute  Goal: Optimal Pain Control and Function  11/3/2023 0438 by Janell Cooper RN  Outcome: Progressing  11/3/2023 0218 by Janell Cooper RN  Outcome: Progressing  Intervention: Prevent or Manage Pain  Recent Flowsheet Documentation  Taken 11/2/2023 2000 by Janell Cooper RN  Sensory Stimulation Regulation:   care clustered   lighting decreased   quiet environment promoted   music on  Complementary Therapy: music therapy provided  Medication Review/Management:   medications reviewed   high-risk medications identified  Intervention: Optimize Psychosocial Wellbeing  Recent Flowsheet Documentation  Taken 11/2/2023 2000 by Janell Cooper RN  Supportive Measures:   active listening utilized   positive reinforcement provided   relaxation techniques promoted   verbalization of feelings encouraged     Problem: Palliative Care  Goal: Enhanced Quality of Life  Intervention: Maximize Comfort  Recent Flowsheet Documentation  Taken 11/2/2023 2000 by Janell Cooper RN  Complementary Therapy: music therapy provided     Alert at start of shift, yelling out in pain, inconsolable. Disoriented to place, time, and situation. Pt received 10 mg of high concentration Oxycodone and Zyprexa, patient appeared much calmer and comfortable following medication. Purewick placed at start of shift, patient pulled Purewick out, Purewick kept out. Repositioned every 2 hours, barrier cream applied. Patient slept throughout most of shift.

## 2023-11-03 NOTE — PROGRESS NOTES
SPIRITUAL HEALTH SERVICES ([facility])  SPIRITUAL ASSESSMENT Progress Note  [facility]. [address]    REFERRAL SOURCE:      Thank you for the referral. Mehreen slept through the visit and the subsequent prayers. Eladio, her daughter, is regularly by her bedside. While Mehreen has 4 other children, her siblings are experiencing a number of serious medical issues beyond Mehreen's at this time.      PLAN: Eladio is very open to [facility] visits. Will follow as able during LOS.     Sravanthi Avila, Ph.D., [facility]      SHS available 24/7 for emergency requests/referrals, either by having the on-call  paged or by entering an ASAP/STAT consult in Epic (this will also page the on-call ).

## 2023-11-03 NOTE — PROGRESS NOTES
"Orthopedic Progress Note      Assessment: 7 Days Post-Op  S/P Procedure(s):  LEFT INTERNAL FIXATION, FRACTURE, TROCHANTERIC, HIP, USING PINS OR RODS @    Plan:   - Continue PT/OT  - Weightbearing status: TTWB with walker  - Anticoagulation: ASA in addition to SCDs, vicente stockings and early ambulation.  Patient orthopedic condition is stable at this time, will plan to sign off on this patient at this time. Please call consult again if needed during hospital admission.       Subjective:  Pain: controlled  Chest pain, SOB: non  Nausea, Vomiting:  no  Lightheadedness, Dizziness:  no  Neuro:  Patient denies new onset numbness or paresthesias    Patient is resting and in/out of awareness. Has some pain around ribs and hip area but comfortable.     Objective:  /64 (BP Location: Right arm, Patient Position: Semi-Sibley's, Cuff Size: Adult Small)   Pulse 82   Temp 99.1  F (37.3  C) (Oral)   Resp 15   Ht 1.676 m (5' 6\")   Wt 62.8 kg (138 lb 7.2 oz)   SpO2 91%   BMI 22.35 kg/m    The patient is A&Ox3. Appears comfortable.   Sensation is intact.  Dorsiflexion and plantar flexion is intact.  Dorsalis pedis pulse intact.  Calves are soft and non-tender. Negative Cricket's.  The incision is covered. Dressing C/D/I.        Pertinent Labs   Lab Results: personally reviewed.   Lab Results   Component Value Date    INR 1.04 10/28/2023    INR 1.04 12/24/2020     Lab Results   Component Value Date    WBC 8.1 10/27/2023    HGB 7.7 (L) 11/02/2023    HCT 32.0 (L) 10/27/2023     (H) 10/27/2023     10/27/2023     Lab Results   Component Value Date     10/29/2023    CO2 27 10/29/2023         Report completed by:  Efren Dick PA-C/Dr. [facility]    Date: 11/3/2023  Time: 2:23 PM    "

## 2023-11-04 NOTE — PROGRESS NOTES
[facility]    Medicine Progress Note - Hospitalist Service    Date of Admission:  10/26/2023    Assessment & Plan   83-year-old with history of chronic dementia, osteoporosis with previous compression fractures of T12, L3, L4 and L5, memory care resident admitted for recurrent falls and pain of left hip.  Diagnosed with left hip fracture intertrochanteric proximal fracture.  [place] [facility] performed left internal fixation on 10/27/2023.  Developed postoperative anemia which required transfusion and has been stable.  After meeting with palliative care provider today patient has elected for comfort care on 11/2/23.  Care management consulted for Hospice at care facility, and likely here over weekend.     Palliative care enounter  Left hip fracture  Status post left internal fixation on 10/27.  Appreciate orthopedic recommendations.  Toe-touch weightbearing with walker  Activity as tolerated.   Fall precautions.  Pain management per palliative care.    Essential hypertension  PTA medication: Losartan 50 mg daily.    Osteoporosis  Multiple compression fractures of lumbar and thoracic spine.  Continue supportive care, and pain management.    Aortic stenosis  Moderate aortic stenosis on echocardiogram.  Regular follow-up with cardiology for monitoring.    Lewy body dementia.  PTA medication: Sinemet.   Gabapentin 100 mg TID  Olanzapine as needed.   Haldol as needed.     Postoperative anemia secondary acute blood loss  No indication for transfusion  Patient is comfort cares only.           Diet: Advance Diet as Tolerated: Regular Diet Adult    Diaz Catheter: Not present  Lines: None     Cardiac Monitoring: None  Code Status: No CPR- Do NOT Intubate      Clinically Significant Risk Factors                  # Hypertension: Noted on problem list                   Disposition Plan     Expected Discharge Date: 11/04/2023      Destination: assisted living  Discharge Comments: pending hospice  placement            Chris Stephenson DO  Hospitalist Service  [facility]  Securely message with [employer] (more info)  Text page via [employer] Paging/Directory   ______________________________________________________________________    Interval History   Mehreen is demented.  She is laying naked in bed on my arrival.  She denies pain or discomfort.  She reports feeling tired and hoping to leave hospital soon.     Physical Exam   Vital Signs: Temp: 97.2  F (36.2  C) Temp src: Oral BP: 139/64 Pulse: 76   Resp: 16 SpO2: 94 % O2 Device: None (Room air)    Weight: 138 lbs 7.18 oz    general appearance: laying in bed.  She is naked and gown is laying on floor next to bed.    Head: Normocephalic, without obvious abnormality, atraumatic  Eyes: Lids and lashes normal.    Nose: no discharge  Throat: Lips without lesions, oral mucosa moist.  Lungs: clear to auscultation bilaterally  Heart: Regular rate and rhythm, 2/6 systolic murmur.   Abdomen: Soft, nondistended  Extremities: No cyanosis or edema noted  Neurologic: calm, demented.     Medical Decision Making       40 MINUTES SPENT BY ME on the date of service doing chart review, history, exam, documentation & further activities per the note.      Data     I have personally reviewed the following data over the past 24 hrs:    N/A  \   8.4 (L)   / N/A     N/A N/A N/A /  N/A   N/A N/A N/A \       Imaging results reviewed over the past 24 hrs:   No results found for this or any previous visit (from the past 24 hour(s)).

## 2023-11-04 NOTE — PROGRESS NOTES
Care Management Follow Up    Length of Stay (days): 9    Expected Discharge Date: 11/06/2023     Concerns to be Addressed: discharge planning     Patient plan of care discussed at interdisciplinary rounds: Yes    Anticipated Discharge Disposition:  Facility with hospice         Additional Information:  Chart reviewed, pt on comfort cares.  Referrals made to [facility] and [facility] for hospice.  CM will need to follow up with referral sources on Mon regarding bed availability.    NAM Souza

## 2023-11-04 NOTE — PLAN OF CARE
Goal Outcome Evaluation:  Pt is alert to self only and sometimes recognizes family. Pt was briefly more alert today and talking in full sentences. The sentences were gibberish, but she was much more talkative.  Pt would occasionally express discomfort/pain. The scheduled Oxy seemed to be sufficient for this shift. The last 2 morning reports that I've received from off-going night staff, they've said pt was restless on the evening shift but then they were able to help her get to sleep. Awaiting placement availability for hospice care

## 2023-11-04 NOTE — PLAN OF CARE
Pt is A&Ox1. Pt demonstrated verbal and nonverbal indicators of pain including yelling out and restlessness. Managed with prn and scheduled medications. Mepilex in place to pressure injury on coccyx, dressing is CDI. No IV access. Voiding adequately, incontinent. Q2 turns completed. Education on medication administration, alarms, and use of call-light to reduce risk for falls and injury. VSS.

## 2023-11-04 NOTE — PLAN OF CARE
Goal Outcome Evaluation:         Patient comfortable and able to arouse with repositioning and taking medications. Dressing to left hip was off upon assessment and replaced with new dressing. Open sacral wound, mepilex applied. Bottom appears denuded and red.

## 2023-11-05 PROBLEM — Z92.89 HISTORY OF RECENT BLOOD TRANSFUSION: Status: ACTIVE | Noted: 2023-01-01

## 2023-11-05 PROBLEM — Z51.5 HOSPICE CARE: Chronic | Status: ACTIVE | Noted: 2023-01-01

## 2023-11-05 PROBLEM — D62 ACUTE BLOOD LOSS AS CAUSE OF POSTOPERATIVE ANEMIA: Status: ACTIVE | Noted: 2023-01-01

## 2023-11-05 PROBLEM — R29.6 FALLS FREQUENTLY: Chronic | Status: ACTIVE | Noted: 2023-01-01

## 2023-11-05 PROBLEM — S72.145A CLOSED NONDISPLACED INTERTROCHANTERIC FRACTURE OF LEFT FEMUR, INITIAL ENCOUNTER (H): Chronic | Status: ACTIVE | Noted: 2023-01-01

## 2023-11-05 PROBLEM — F03.918 SENILE DEMENTIA, WITH BEHAVIORAL DISTURBANCE (H): Status: ACTIVE | Noted: 2023-01-01

## 2023-11-05 PROBLEM — Z66 DNR (DO NOT RESUSCITATE): Status: ACTIVE | Noted: 2023-01-01

## 2023-11-05 PROBLEM — Z66 DNR (DO NOT RESUSCITATE): Chronic | Status: ACTIVE | Noted: 2023-01-01

## 2023-11-05 PROBLEM — M80.00XD AGE-RELATED OSTEOPOROSIS WITH CURRENT PATHOLOGICAL FRACTURE WITH ROUTINE HEALING: Status: ACTIVE | Noted: 2023-01-01

## 2023-11-05 PROBLEM — F03.918 SENILE DEMENTIA, WITH BEHAVIORAL DISTURBANCE (H): Chronic | Status: ACTIVE | Noted: 2023-01-01

## 2023-11-05 PROBLEM — S72.142A DISPLACED INTERTROCHANTERIC FRACTURE OF LEFT FEMUR, INITIAL ENCOUNTER FOR CLOSED FRACTURE (H): Chronic | Status: ACTIVE | Noted: 2023-01-01

## 2023-11-05 PROBLEM — Z51.5 HOSPICE CARE: Status: ACTIVE | Noted: 2023-01-01

## 2023-11-05 PROBLEM — M80.88XD OSTEOPOROTIC COMPRESSION FRACTURE OF SPINE, WITH ROUTINE HEALING, SUBSEQUENT ENCOUNTER: Status: ACTIVE | Noted: 2023-01-01

## 2023-11-05 NOTE — PLAN OF CARE
Pt is A&Ox1. Pt demonstrated verbal and nonverbal indicators of pain, managed with scheduled oxycodone. Mepilex on coccyx. L hip dressings are CDI. Q2 turns completed. Voiding adequately, incontinent. Denies chest pain, shortness of breath, and nausea.

## 2023-11-05 NOTE — PLAN OF CARE
Goal Outcome Evaluation:  Pt is alert to self & occasionally recognizes family. She's slept the vast majority of this shift. She has awoken enough for brief periods to take her pills with apple sauce. Just a moment ago she took pills that way for me; then a moment later she couldn't close her lips around a straw to take a drink. I touched the straw to her lower lip, and she moved her mouth around a little but would not use the straw. Pt then went right back to sleep. Pt's facial complexion appears more ashen at this time than is has the last 2 days. She has not voided or had a BM this shift. Her principle intake has been the pills with applesauce. She has reportedly been too drowsy to eat much at any meal, or have snacks in between. Pt's family are aware of her condition & two members were here earlier today. Possible transfer to a Hospice facility as soon as tomorrow.

## 2023-11-05 NOTE — PROGRESS NOTES
Physical Therapy Discharge Summary    Reason for therapy discharge:    No further expectations of functional progress.  Patient/family request discontinuation of services.  Comfort Cares    Progress towards therapy goal(s). See goals on Care Plan in [facility] electronic health record for goal details.  Goals not met.  Barriers to achieving goals:   limited tolerance for therapy.    Therapy recommendation(s):    No further therapy is recommended.

## 2023-11-05 NOTE — PLAN OF CARE
Problem: Adult Inpatient Plan of Care  Goal: Absence of Hospital-Acquired Illness or Injury  Outcome: Progressing  Intervention: Identify and Manage Fall Risk  Recent Flowsheet Documentation  Taken 11/4/2023 1630 by Lanny Chavarria RN  Safety Promotion/Fall Prevention:   treat underlying cause   treat reversible contributory factors   supervised activity   safety round/check completed   room organization consistent   room near nurse's station   patient and family education   nonskid shoes/slippers when out of bed   lighting adjusted   mobility aid in reach   increase visualization of patient   clutter free environment maintained   assistive device/personal items within reach   activity supervised  Intervention: Prevent and Manage VTE (Venous Thromboembolism) Risk  Recent Flowsheet Documentation  Taken 11/4/2023 1630 by Lanny Chavarria RN  VTE Prevention/Management: SCDs (sequential compression devices) off     Pt takes pills with applesauce, whole or crushed. Pt has been calm my shift. Pt still pulls off cloths and blankets ext. Q2 turns continued. Total assist with eating.     Lanny Chavarria RN on 11/4/2023 at 10:17 PM

## 2023-11-05 NOTE — PROGRESS NOTES
[facility]    Medicine Progress Note - Hospitalist Service    Date of Admission:  10/26/2023      83-year-old with history of chronic dementia, osteoporosis with previous compression fractures of T12, L3, L4 and L5, memory care resident admitted for recurrent falls and pain of left hip.  Diagnosed with left hip fracture intertrochanteric proximal fracture.  [place] [facility] performed left internal fixation on 10/27/2023.  Developed postoperative anemia which required transfusion and has been stable.  After meeting with palliative care provider today patient has elected for comfort care on 11/2/23.  Care management consulted for Hospice at care facility, and likely here over weekend.            Assessment & Plan   Principal Problem:    Closed fracture of multiple ribs of both sides, initial encounter (10/26/2023)  Active Problems:    DNR (do not resuscitate) (11/5/2023)    Hospice care (11/5/2023)    Lives in assisted living facility (5/16/2020)    Falls frequently (10/26/2023)    Displaced intertrochanteric fracture of left femur, initial encounter for closed fracture (H) (10/26/2023)    Senile dementia, with behavioral disturbance (H) (11/5/2023)    Osteoporotic compression fracture of spine, with routine healing, subsequent encounter (11/5/2023)    History of recent blood transfusion (11/5/2023)    Acute blood loss as cause of postoperative anemia (11/5/2023)             Diet: Advance Diet as Tolerated: Regular Diet Adult    DVT Prophylaxis: Comfort care  Diaz Catheter: Not present  Lines: None     Cardiac Monitoring: None  Code Status: No CPR- Do NOT Intubate      Clinically Significant Risk Factors                  # Hypertension: Noted on problem list                   Disposition Plan     Expected Discharge Date: 11/06/2023      Destination: assisted living  Discharge Comments: pending hospice placement            Sea Metz MD  Hospitalist Service  [facility]  Hospital  Securely message with [employer] (more info)  Text page via [employer] Paging/Directory   ______________________________________________________________________    Interval History     No significant change patient continues to be basically obtunded comfort care has been requested with hospice consult pending  DNR status noted    Physical Exam   Vital Signs:                    Weight: 138 lbs 7.18 oz    Medical Decision Making       25 MINUTES SPENT BY ME on the date of service doing chart review, history, exam, documentation & further activities per the note.        Data

## 2023-11-06 NOTE — PROGRESS NOTES
[facility]    Medicine Progress Note - Hospitalist Service    Date of Admission:  10/26/2023      83-year-old with history of chronic dementia, osteoporosis with previous compression fractures of T12, L3, L4 and L5, memory care resident admitted for recurrent falls and pain of left hip.  Diagnosed with left hip fracture intertrochanteric proximal fracture.  [place] [facility] performed left internal fixation on 10/27/2023.  Developed postoperative anemia which required transfusion and has been stable.  After meeting with palliative care provider today patient has elected for comfort care on 11/2/23.  Care management consulted for Hospice at care facility, and likely here over weekend.      Changes today:  -Pending placement at facility w/Hospice      Assessment & Plan   Principal Problem:    Closed fracture of multiple ribs of both sides, initial encounter (10/26/2023)  Active Problems:    DNR (do not resuscitate) (11/5/2023)    Hospice care (11/5/2023)    Lives in assisted living facility (5/16/2020)    Falls frequently (10/26/2023)    Displaced intertrochanteric fracture of left femur, initial encounter for closed fracture (H) (10/26/2023)    Senile dementia, with behavioral disturbance (H) (11/5/2023)    Osteoporotic compression fracture of spine, with routine healing, subsequent encounter (11/5/2023)    History of recent blood transfusion (11/5/2023)    Acute blood loss as cause of postoperative anemia (11/5/2023)             Diet: Advance Diet as Tolerated: Regular Diet Adult    DVT Prophylaxis: Comfort care  Diaz Catheter: Not present  Lines: None     Cardiac Monitoring: None  Code Status: No CPR- Do NOT Intubate      Clinically Significant Risk Factors                  # Hypertension: Noted on problem list     # Dementia: noted on problem list               Disposition Plan      Expected Discharge Date: 11/07/2023    Discharge Delays: Placement - LTC  Destination: assisted  living  Discharge Comments: pending hospice placement            Elia Duckworth MD  Hospitalist Service  [facility]  Securely message with [employer] (more info)  Text page via [employer] Paging/Directory   ______________________________________________________________________    Interval History   See nursing note for this morning had a little bit of a rough time however during my interview patient was significantly improved and tolerating p.o. intake.  She did have several as needed's prior to my arrival this morning.  She denies any pain and is calm and pleasant with me.    Physical Exam   Vital Signs:                    Weight: 138 lbs 7.18 oz    Gen:NAD  Cards:Talking, pulses assumed  PULM:Normal I/E effort on room air.    Medical Decision Making       30 MINUTES SPENT BY ME on the date of service doing chart review, history, exam, documentation & further activities per the note.        Data

## 2023-11-06 NOTE — PROGRESS NOTES
Care Management Follow Up    Length of Stay (days): 11    Expected Discharge Date: 11/7/2023     Concerns to be Addressed: discharge planning     Patient plan of care discussed at interdisciplinary rounds: Yes    Anticipated Discharge Disposition: Assisted Living, Transitional Care     Anticipated Discharge Services: None  Anticipated Discharge DME: None    Patient/family educated on Medicare website which has current facility and service quality ratings: no  Education Provided on the Discharge Plan: Yes  Patient/Family in Agreement with the Plan: yes    Additional Information:  CM called [facility] at 8:39 AM and reached voicemail.     CM called [facility] at 8:49AM and spoke with Skip who states that the team is going to review today and follow up with the CM once they are able finish the review.    11:45 AM  CM called Sharla with [facility] as requested and reached voicemail.     CM spoke with the RN of the Pt as family requested an update, rajiv    2:49 PM  CM spoke with Sharla and gave update as needed.     CM called [facility] and yasir Castro stated at this time they are not able to take the referral. However, they will hang on to the referral if anything is to change.     CM called [facility] and spoke Eufemia who states that they are still considering the pt and will follow up with the CM once they know more.     NAM Munroe

## 2023-11-06 NOTE — PLAN OF CARE
"Pt is A&Ox1, pt more awake overnight than previous two nights. Pt was restless at times and said her hip hurt, scheduled oxycodone effective. L hip dressings are CDI, pt ripped off long dressing at 0600, replaced. Mepilex in place on coccyx. Q2 turns completed as tolerated. Voiding adequately, incontinent.     Around 0600 pt was ripping off her gown, refusing to let anyone help her put it back on or cover her up with blankets. Yelling at everyone to \"get the h*ll out of her room and leave her house alone\". Pt trying to swing legs out of bed, able to move legs back in bed and placed third side rail up. Bed alarm on, frequently checking on pt. PRN haldol given with the help of SWAT nurse.     Went to check on pt with oncoming nurse after report, pt yelling help, asked what we could do to help, pt responded with \"you're ugly, drop dead\". Pt laying in bed, three side rails up, bed alarm on for safety  "

## 2023-11-06 NOTE — PLAN OF CARE
Goal Outcome Evaluation:  Patient only oriented to self, re-orientation provided prn.  Patient agitated at start of shift, yelling at staff and refusing cares.  Bed alarm on and calm environment provided, frequent checks.  Patient had ripped off middle dressing, new gauze with transparent dressing applied, Sowmya FREEDMAN notified and aware, incision was intact/approximated.  Patient calmed down and able to give AM medications crushed in apple sauce and repositioned patient around 0930.  Patient reported pain occasionally, given scheduled and prn oxycodone per patient and family request due to pain to promote comfort.  Order obtained from Dr. Duckworth for Lidoderm patch, applied to patient's back per family request.  Patient became more restless this afternoon, spoke with family and pharmacist and given dose of prn Zyprexa and patient calm post.  Patient repositioned q2h with 2 assist, incontinence care provided prn.  Family at bedside.  Patient declined shades up, declined VS checks.  Patient on comfort cares.  Discharge placement pending, SW following.

## 2023-11-06 NOTE — PROGRESS NOTES
Palliative Care Consultation Note  [facility]      Patient Name: Mehreen Camara  Date of Admission: 10/26/2023   Today the patient was seen for: goals of care and symptom management      Recommendations & Counseling      GOALS OF CARE:   Continue comfort cares.    Continue to titrate medications for pain and symptom management including management of delirium and restlessness and agitation.    Daughter interested in involving hospice at discharge.   Appreciate CM assistance/work on discharge options.      ADVANCE CARE PLANNING:  Health care directive on file, with primary health care agent daughter Alysa and secondary daughter Irasema ROBERT on file, completed 6/2022 indicating her wish for no CPR and comfort focused medical treatments.  Code status: No CPR- Do NOT Intubate     MEDICAL MANAGEMENT:   Comfort Care   Below are common symptoms routinely seen in patients with comfort focused goals and at the end of life. This patient may not currently exhibit all of these symptoms; however, if these were to occur our recommendations are as follows:      #Pain -thoracic and lumbar spine and right hip, pt seems overall comfortable (mostly denies pain, but every now and then mentions has some). She is much more alert today, relative to the last 5 days, per dtr Eladio.   Continue Oxycodone concentrate continue 5 mg q4h.  Oxycodone concentrate SL 5-10 q 1 hour as needed   Appreciate RN looking into an alternative bed mattress for pt.   New prescription for a lidocaine patch to back today, per dtr request (pt had had in the past).      #Air hunger/Dyspnea, does not appear in any respiratory distress.    Has Oxycodone concentrate continue 5 mg q 4 hours    Oxycodone concentrate SL 5-10 q 1 hour as needed      #Secretion burden   Robinul 0.2 mg  q 4 hours as needed. If ineffective, increase up to 0.3 mg   Consider atropine if Robinul ineffective after dose increase      #Nausea   Zofran 4 mg q 6  "hours as needed. Can increase to 8 mg   Zyprexa 5 mg q 8 hours as needed      #Agitation, in the setting of Lewy Body dementia.  Aggressive symptoms control first. Remains at times an issue (was this AM).   Since patient exhibiting difficulty swallowing pills, has Haldol concentrate 2 mg SL every 4 hours as needed.  Seroquel discontinued   Has Zyprexa 5 mg ODT BID and q8h prn. Will avoid IM. Could consider increase dose of Zyprexa to 10 mg.      PSYCHOSOCIAL/SPIRITUAL SUPPORT:   ~ 10 years ago.  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      Palliative Care will continue to follow. Thank you for the consult and allowing us to aid in the care of Mehreen Camara.     These recommendations have been discussed with Jan Grajeda, care management, Partha Mane RN and Dr. Stephenson.     HAILEE Bueno, FNP-BC, PMHNP-BC  Palliative Care Nurse Practitioner  [facility]  [phone]      During regular M-F work hours -- if you are not sure who specifically to contact -- please contact us by calling [phone].'  Securely message with [employer] (more info)  Text page via [facility] Paging/Directory      TTS: I have personally spent a total of 40 minutes  today on the unit in review of medical record, consultation with the medical providers and assessment of patient, with more than 50% of this time spent in counseling, coordination of care, and discussion with pt and her 2 dtrs, re: symptom management (both pharmacologic and non-pharmacologic), risks and benefits of management options, discharge plans, emotional support and development of plan of care.           Assessments           Per colleague, Mehreen Camara is a 83 year old female with a past medical history of multi-system atrophy, Lewy Body dementia, aortic stenosis, " history of falls who presented on 10/26/23 with left hip pain and was found to have an acute left intertrochanteric proximal femur fracture.  She also has old compression fractures of T12, L3, L4, L5.  She underwent a right hip trochanteric nailing on 10/27. Has been followed by outpatient palliative clinic, with last visit in 6/2023     Prognosis, Goals, & Planning:   Prognosis, Goals, and/or Advance Care Planning addressed today:  Continue comfort cares and medication titration for pain control.  Assisted with completing OLP application with care management.  Palliative Performance Score:     20%- 1. Totally bed bound; 2. Unable to do any activity, extensive disease; 3. Total care; 4. Minimal to sips; 5. Full or drowsy +/- confusion      Coping, Meaning, & Spirituality:   Mood, coping, and/or meaning in the context of serious illness were addressed today: A little, re: both pt and dtrs (coping, plan of care).         Interval History:      Chart review/discussion with unit or clinical team members:   Intermittently restless early morning. Currently in no distress, calm, (dtr feels however she is escalating, says is much more alert today, than the last 4-5 days).    Medications:  I have reviewed this patient's medication profile and medications from this hospitalization. Notable medications: Oxycodone, Haldol, Zyprexa, Lidocaine patch    ROS:  Comprehensive ROS is reviewed and is negative except as here & per HPI:     Physical Exam   Vital Signs with Ranges     138 lbs 7.18 oz    PHYSICAL EXAM:  Alert, talking more, at times sounding frustrated, at times calm.  Lungs CTA  CV RRR  Abd soft

## 2023-11-07 NOTE — PROGRESS NOTES
PALLIATIVE CARE PROGRESS NOTE  [facility]     Patient Name: Mehreen Camara  Date of Admission: 10/26/2023   Today the patient was seen for: goals of care and symptom management      Recommendations & Counseling      GOALS OF CARE:-Continue full comfort cares.    Saw pt multiple times over the day, sleeping comfortably, or interacting calm, except for when saw at 300 with family. Pt then complained of significant headache and had to have a BM (was able to speak, indicate her pain and needs). Dtr Eladio present (along with 2 other children and DIL). Eladio does share that pt has a hx of MSA and has had behaviors similar to what pt is currently experiencing around 400 PM every day for the last year. Does wonder if the cessation of her BP medication Losartan today is causing her headache/(later, she shares she worries that pt will be very uncomfortable going forward off of the Losartan).   Appreciate Hospitalist attendance at this meeting and reviewing this question with dtr. At this time, will hold off on restarting Losartan and focus on pain control. Pt has had scheduled pain medication for several days and has not needed many PRNS.         -Continue to titrate medications for pain and symptom management including management of delirium and restlessness and agitation.      -Daughter interested in involving hospice at discharge.     -Appreciate CM assistance/work on discharge options. Did provide family an update re: discharge and that at this time, pt has not yet been accepted into a facility. Behaviors have been a concern.      ADVANCE CARE PLANNING:  Health care directive on file, with primary health care agent daughter Alysa and secondary daughter Irasema ROBERT on file, completed 6/2022 indicating her wish for no CPR and comfort focused medical treatments.  Code status: No CPR- Do NOT Intubate     MEDICAL MANAGEMENT:   Comfort Care   Below are common symptoms routinely seen in  patients with comfort focused goals and at the end of life. This patient may not currently exhibit all of these symptoms; however, if these were to occur our recommendations are as follows:      #Pain -thoracic and lumbar spine and right hip historically and now a headache today, (prior to this afternoon at 300 PM, pt had seemed overall very comfortable. Yesterday, she had more alert (relative to the last 5 days, per dtr Eladio). Dtr she had been mostly sleepy, until uncomfortable at 300 (? Headache, needing to have a BM, vs opioid dose tolerance).  Did not get Cymbalta nor Gabapentin today 2nd somnolence (which could also potentially have contributed to pain this afternoon). Pt does not have IV access now.  -Per MD, one time additional dose of Oxycodone 5 mg SL x now.  Continue Oxycodone concentrate continue 5 mg q4h.  Shortened interval of PRN Oxycodone concentrate SL 5-10 to q 1 hour as needed. Recommend not to use until 1 hour after scheduled medication.    Appreciate RN looking into an alternative bed mattress for pt.   Continue lidocaine patch to back per dtr request (pt had had in the past). Can increase lidocaine patch to 2 patches per day. Will review tomorrow.      #Air hunger/Dyspnea, does not appear in any respiratory distress.    Oxycodone concentrate continue 5 mg q 4 hours    Oxycodone concentrate SL 5-10 q 1 hour as needed      #Secretion burden   Robinul 0.2 mg  q 4 hours as needed. If ineffective, increase up to 0.3 mg   Consider atropine if Robinul ineffective after dose increase      #Nausea   Zofran 4 mg q 6 hours as needed. Can increase to 8 mg   Zyprexa 5 mg q 8 hours as needed      #Agitation, in the setting of Lewy Body dementia.  Aggressive symptoms control first. Remains at times an issue.   Since patient exhibiting difficulty swallowing pills, has Haldol concentrate 2 mg SL every 4 hours as needed.  Seroquel PRN   Has Zyprexa 5 mg ODT BID and q8h prn. Will avoid IM. Could consider increase  "dose of Zyprexa to 10 mg.   Pt has had a labile course. Currently getting 3 different antipsychotics. Plan to reassess in AM, consider reducing to scheduled SL Haldol, if acceptable to family.      PSYCHOSOCIAL/SPIRITUAL SUPPORT:   ~ 10 years ago.  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      Palliative Care will continue to follow. Thank you for the consult and allowing us to aid in the care of Mehreen Camara.     These recommendations have been discussed with MD, RN, CM     HAILEE Bueno, FNP-BC, PMHNP-BC  Palliative Care Nurse Practitioner  [facility]  [phone]        During regular M-F work hours -- if you are not sure who specifically to contact -- please contact us by calling [phone].'  Securely message with [employer] (more info)  Text page via [employer] Paging/Directory      TTS: I have personally spent a total of 50 minutes  today on the unit in review of medical record, consultation with the medical providers and assessment of patient, with more than 50% of this time spent in counseling, coordination of care, and discussion with pt and 3 children/DIL, and MD, re: symptom management (both pharmacologic and non-pharmacologic), risks and benefits of management options, discharge plans, emotional support and development of plan of care. An additional 30 minutes was spent in an extended visit today as had multiple visits seeing pt, talking w/RN re: timing for family arrival for meeting, reviewing symptom mgmt plan with MD.           Assessments           Per colleague, Mehreen Camara is a 83 year old female with a past medical history of multi-system atrophy, Lewy Body dementia, aortic stenosis, history of falls who presented on 10/26/23 with left hip pain and was found to have an acute left " intertrochanteric proximal femur fracture.  She also has old compression fractures of T12, L3, L4, L5.  She underwent a right hip trochanteric nailing on 10/27. Has been followed by outpatient palliative clinic, with last visit in 6/2023     Prognosis, Goals, & Planning:   Prognosis, Goals, and/or Advance Care Planning addressed today:  Continue comfort cares and medication titration for pain control.  Assisted with completing OLP application with care management.  Palliative Performance Score:     20%- 1. Totally bed bound; 2. Unable to do any activity, extensive disease; 3. Total care; 4. Minimal to sips; 5. Full or drowsy +/- confusion      Coping, Meaning, & Spirituality:   Mood, coping, and/or meaning in the context of serious illness were addressed today: A little, re: both pt and dtrs (coping, plan of care).         Interval History:      Chart review/discussion with unit or clinical team members:   Pt had been very calm/somnolent this AM, however around 300, was awake, uncomfortable, having a headache and needing to have a BM.      Medications:  I have reviewed this patient's medication profile and medications from this hospitalization. Notable medications: Oxycodone, Haldol, Zyprexa, Lidocaine patch     ROS:  Comprehensive ROS is reviewed and is negative except as here & per HPI:           Physical Exam:      138 lbs 7.18 oz    Physical Exam  At 230, calm, sleepy.  Lungs CTA  CV RRR  Abd soft             Current Problem List:   Principal Problem:    Closed fracture of multiple ribs of both sides, initial encounter  Active Problems:    Lives in assisted living facility    Falls frequently    Displaced intertrochanteric fracture of left femur, initial encounter for closed fracture (H)    Senile dementia, with behavioral disturbance (H)    Osteoporotic compression fracture of spine, with routine healing, subsequent encounter    DNR (do not resuscitate)    History of recent blood transfusion    Acute blood loss  as cause of postoperative anemia    Hospice care      Allergies   Allergen Reactions    Penicillins Anaphylaxis, Rash and Shortness Of Breath    Aspirin Nausea and GI Disturbance    Atenolol Cough    Atorvastatin Muscle Pain (Myalgia) and Nausea and Vomiting    Bupropion Nausea    Cephalexin Rash     Localized to neck area    Clindamycin Other (See Comments)     Constipation     Codeine Nausea    Ibuprofen Nausea and GI Disturbance    Lovastatin Muscle Pain (Myalgia)            Data Reviewed:     No recent data in the last 24 hours.

## 2023-11-07 NOTE — PROGRESS NOTES
[facility]    Medicine Progress Note - Hospitalist Service    Date of Admission:  10/26/2023      83-year-old with history of chronic dementia, osteoporosis with previous compression fractures of T12, L3, L4 and L5, memory care resident admitted for recurrent falls and pain of left hip.  Diagnosed with left hip fracture intertrochanteric proximal fracture.  [facility] performed left internal fixation on 10/27/2023.  Developed postoperative anemia which required transfusion and has been stable.  After meeting with palliative care provider today patient has elected for comfort care on 11/2/23.  Care management consulted for Hospice at care facility, and likely here over weekend.      Changes today:  -Pending placement at facility w/Hospice  -Confirmed with family focusing on comfort, stopped antihypertensive and asprin due to pill burden  -Continue to up titrate medications as necessary, gave one time dose of oxycodone today.  -Appreciate palliative cares assistance with symptoms and care conference today.       Assessment & Plan   Principal Problem:    Closed fracture of multiple ribs of both sides, initial encounter (10/26/2023)  Active Problems:    DNR (do not resuscitate) (11/5/2023)    Hospice care (11/5/2023)    Lives in assisted living facility (5/16/2020)    Falls frequently (10/26/2023)    Displaced intertrochanteric fracture of left femur, initial encounter for closed fracture (H) (10/26/2023)    Senile dementia, with behavioral disturbance (H) (11/5/2023)    Osteoporotic compression fracture of spine, with routine healing, subsequent encounter (11/5/2023)    History of recent blood transfusion (11/5/2023)    Acute blood loss as cause of postoperative anemia (11/5/2023)             Diet: Advance Diet as Tolerated: Regular Diet Adult    DVT Prophylaxis: Comfort care  Diaz Catheter: Not present  Lines: None     Cardiac Monitoring: None  Code Status: No CPR- Do NOT Intubate       Clinically Significant Risk Factors                  # Hypertension: Noted on problem list     # Dementia: noted on problem list               Disposition Plan      Expected Discharge Date: 11/08/2023    Discharge Delays: Placement - LTC  *Medically Ready for Discharge  Destination: assisted living  Discharge Comments: pending hospice placement            Elia Duckworth MD  Hospitalist Service  [facility]  Securely message with [employer] (more info)  Text page via [employer] Paging/Directory   ______________________________________________________________________    Interval History   Seen this morning and was resting comfortably did not appear to be in any pain or distress.  See patient later in the afternoon she appeared uncomfortable with a wet washcloth over her head she had previously been complaining of a headache per family and nursing however on my arrival she was reporting some back pain and then also that she needed to have a bowel movement.    Physical Exam   Vital Signs:           Resp: 14        Weight: 138 lbs 7.18 oz    Gen:NAD, resting comfortable in bed  Cards:Talking, pulses assumed  PULM:Normal I/E effort on room air.    Medical Decision Making       45 MINUTES SPENT BY ME on the date of service doing chart review, history, exam, documentation & further activities per the note.        Data     Elia Duckworth MD  Hospitalist

## 2023-11-07 NOTE — PLAN OF CARE
Goal Outcome Evaluation:    Problem: Pain Acute  Goal: Optimal Pain Control and Function  Intervention: Prevent or Manage Pain  Recent Flowsheet Documentation  Taken 11/7/2023 0030 by Mayda Gan, RN  Medication Review/Management: medications reviewed     Problem: Fall Injury Risk  Goal: Absence of Fall and Fall-Related Injury  Intervention: Promote Injury-Free Environment    Problem: Adult Inpatient Plan of Care  Goal: Absence of Hospital-Acquired Illness or Injury  Intervention: Prevent Skin Injury  Recent Flowsheet Documentation  Taken 11/7/2023 0030 by Mayda Gan, RN  Body Position:   turned   left    On comfort care. A&disorientated x 4. Pt is Q2 turn; became combative during care. Oral PRN Seroquel administered; pt appeared comfortable and cooperative after. Dressing clean, dry and intact. Pt is Q2 turn. Bed alarm activated for safety.

## 2023-11-07 NOTE — PROGRESS NOTES
SPIRITUAL HEALTH SERVICES Progress Note          Referral Source/Reason for Visit: [facility] visit due to request for yaya najera              Summary and Recommendations -   Pts dtr at bedside, she was coping appropriately  Pt sleeping peacefully, no signs of pain or distress, writer gave pt yaya najera        PLAN: [facility] will cont to assess and support through RONA Lucero M.Div., [facility]  Staff    [phone]   Spiritual [facility] Services is available 24/7 for emergent requests and consults, either by paging the on-call  or by entering an ASAP/STAT consult in [employer], which will also page the on-call .

## 2023-11-07 NOTE — PLAN OF CARE
Problem: Pain Acute  Goal: Optimal Pain Control and Function  Outcome: Progressing     Problem: Palliative Care  Goal: Enhanced Quality of Life  Outcome: Progressing     Patient drowsy and sleeping throughout the majority of the shift. Does not arouse to voice but to gentle shaking/repositioning. Gave Oxycodone sublingual solution but unable to give other scheduled oral meds due to drowsiness. Offered patient water at times when patient would arouse. Patient refused to drink but allowed oral swabs and mouth care. Turned and repositioned for comfort. Daughter at bedside through majority of shift.

## 2023-11-08 NOTE — PROGRESS NOTES
Care Management Follow Up    Length of Stay (days): 13    Expected Discharge Date: 11/08/2023     Concerns to be Addressed: discharge planning     Patient plan of care discussed at interdisciplinary rounds: Yes    Anticipated Discharge Disposition: back to care home vs LTC     Anticipated Discharge Services: hospice   Anticipated Discharge DME: None    Patient/family educated on Medicare website which has current facility and service quality ratings: yes  Education Provided on the Discharge Plan: Yes  Patient/Family in Agreement with the Plan: yes    Referrals Placed by CM/SW:    Private pay costs discussed: Not applicable    Additional Information:  WILLIS reviewed chart.  Pt seeking LTC with Hospice (anticipate family will be using [facility] ([facility] [phone]) however need to confirm).  Additional LTC referrals needed.  WILLIS called Pt's daughter, Eladio and left voice mail requesting call back.     10:08 AM  Received call back from Pt's daughter, Eladio.  Eladio reports that family is having second thoughts about Pt no longer receiving blood pressure medications , also inquiring about a neurology consult.  WILLIS paged MD with these concerns. Eladio goes between thinking about Pt returning to [facility] on hospice, however has concerns about Pt falling again vs going to LTC.  Eladio acknowledges that [facility] is a familiar environment for Pt, however has concern about Pt not receiving enough care there.  Eladio is interested in referrals to [facility] and [facility].  Eladio has concerns about all facilities not having enough care and Pt going to an entirely new facility that would be unfamiliar to Pt.  WILLIS validated this concern.  Referrals sent to facilities per family request.     NAM Love

## 2023-11-08 NOTE — PLAN OF CARE
Note from [id number]. Per the advanced dementia pain scale and per pt, pain rated 0-9/10 during shift thus far. No new skin issues noted. Turning and repositioning every 2 hours. Pt quite pleasant today and took all her meds. Dressing is CDI. Poor appetite, encouraging oral intake. Education comprehension and physical assessment difficult to obtain d/t pt only being alert to self. No IV access. Purewick in place, voiding in small amounts. Education on medication administration and use of call-light to reduce risk for falls and injury. Alarms in place.  No further issues noted. VSS (daughter requested a set be taken), denies shortness of breath.    Taylor R Schoenecker, RN

## 2023-11-08 NOTE — PROGRESS NOTES
Orthopedic Progress Note      Assessment: 12 Days Post-Op  S/P Procedure(s):  LEFT INTERNAL FIXATION, FRACTURE, TROCHANTERIC, HIP, USING PINS OR RODS @    Plan:   - patient on comfort cares   - will removed dressings and sutures Friday      Report completed by:  Sowmya Wallace PA-C/Dr. [facility]    Date: 11/8/2023  Time: 1:35 PM     [facility] [address] [place] [address]/[facility]  Medication Management  ANTICOAGULATION    Referring Provider: Dr Addie Roberson INR: 2.0-3.0    TODAY'S INR: 4.1    WARFARIN Dosage: hold x1 then decrease to 3.75mg W and 7.5mg all other days    INR (no units)   Date Value   11/18/2021 4.1   10/21/2021 4.1   10/01/2021 1.9   09/23/2021 2.1   08/26/2021 3.1   08/13/2021 1.4   07/16/2021 1.6       Medication changes:  No changes  Notes:    Fingerstick INR drawn per clinic protocol. Patient states no visible blood in urine and no black tarry stool. Denies any missed doses of warfarin. No change in other maintenance medications or in diet. Will recheck INR in 2 weeks. Patient does not feel she has any changes in diet or alcohol consumption. Patient acknowledges working in consult agreement with pharmacist as referred by his/her physician.                   Patient will be traveling to [facility] with multiple side trips from 12/17/21 through approx 1/20/22)    For Pharmacy [facility] Intervention Detail: Dose Adjustment: 2, reason: Therapy Optimization   Total # of Interventions Recommended: 3   Total # of Interventions Accepted: 3   Time Spent (min): 400 INES AmayaPh., 11/18/2021,11:55 AM

## 2023-11-08 NOTE — PROGRESS NOTES
[facility]    Medicine Progress Note - Hospitalist Service    Date of Admission:  10/26/2023      83-year-old with history of chronic dementia, osteoporosis with previous compression fractures of T12, L3, L4 and L5, memory care resident admitted for recurrent falls and pain of left hip.  Diagnosed with left hip fracture intertrochanteric proximal fracture.  [facility] [place] [facility] performed left internal fixation on 10/27/2023.  Developed postoperative anemia which required transfusion and has been stable.  After meeting with palliative care provider today patient has elected for comfort care on 11/2/23.  Care management consulted for Hospice at care facility, and likely here over weekend.      Changes today:  -Pending placement at facility w/Hospice  -Discussed care with patients daughter Eladio, will continue symptom based care, at this time no antihypertensives, continue with current plan comfort/hospice.      Assessment & Plan   83-year-old with history of chronic dementia, osteoporosis with previous compression fractures of T12, L3, L4 and L5, memory care resident admitted for recurrent falls and pain of left hip.  Diagnosed with left hip fracture intertrochanteric proximal fracture.  [facility] performed left internal fixation on 10/27/2023.  Developed postoperative anemia which required transfusion and has been stable.  After meeting with palliative care provider today patient has elected for comfort care on 11/2/23. Pending placement at this time.     Palliative care enounter  Left hip fracture  Status post left internal fixation on 10/27.  Appreciate orthopedic recommendations.  Toe-touch weightbearing with walker  Activity as tolerated.   Fall precautions.  Pain management per palliative care.     Essential hypertension  -Stopped antihypertensives 11/7, patient not elevated 11/8. Family discussed on 11/8 and okay with not checking BP or starting antihypertensives. Explained that  BP is likely to continue dropping with decreased PO intake as well as our meds for comfort.     Osteoporosis  Multiple compression fractures of lumbar and thoracic spine.  Continue supportive care, and pain management.     Aortic stenosis  Moderate aortic stenosis on echocardiogram.  Regular follow-up with cardiology for monitoring.     Lewy body dementia/MSA  PTA medication: Sinemet.   Gabapentin 100 mg TID  Olanzapine as needed.   Haldol as needed.                Diet: Advance Diet as Tolerated: Regular Diet Adult    DVT Prophylaxis: Comfort care  Diaz Catheter: Not present  Lines: None     Cardiac Monitoring: None  Code Status: No CPR- Do NOT Intubate      Clinically Significant Risk Factors                  # Hypertension: Noted on problem list     # Dementia: noted on problem list               Disposition Plan      Expected Discharge Date: 11/09/2023    Discharge Delays: Placement - LTC  *Medically Ready for Discharge  Destination: assisted living  Discharge Comments: pending hospice placement            Elia Duckworth MD  Hospitalist Service  [facility]  Securely message with [employer] (more info)  Text page via [employer] Paging/Directory   ______________________________________________________________________    Interval History   No acute events overnight.  Eladio had called and was wondering about a neurology consult as well as resuming antihypertensives.  After discussing with her her biggest concern is that her mother does not suffer which is understandable we agreed to continue treating her pain and anxiety aggressively.  We did discuss her antihypertensives this was a longstanding chronic issue that they have historically been very diligent on and she states that herself and the family feels uncomfortable about just discontinuing antihypertensives as they do not want to cause them other pain.  Nursing did recheck a blood pressure today and her systolics were in the 130s.  Relayed this  information to Eladio that the expected trajectory of her blood pressure is going to be lower now that she is not eating as well as less aggressively treating her pain.  I did offer to resume some antihypertensive if it would make the family feel comfortable but expressed my concerns for hypotension which is much more likely than hypertension.  After our discussion Eladio agreed to continue our current management of pain control and anxiety without the need for further antihypertensives or blood pressure monitoring.    Physical Exam   Vital Signs: Temp: 98.6  F (37  C) Temp src: Axillary BP: 137/65 Pulse: 94   Resp: 14 SpO2: 99 % O2 Device: None (Room air)    Weight: 138 lbs 7.18 oz    Gen:NAD, resting comfortable in bed  PULM:Normal I/E effort on room air.    Medical Decision Making       65 MINUTES SPENT BY ME on the date of service doing chart review, history, exam, documentation & further activities per the note.        Data     Elia Duckworth MD  Hospitalist

## 2023-11-08 NOTE — PROGRESS NOTES
Quick Palliative Care note:  Stopped by to see pt x 2 today, both times resting comfortably. Spoke with RN. Pt able to take PO medications and symptoms seemed managed.  Reviewed CM note re: discussion with dtr and appreciate Hospitalist connecting with dtr. Plan is to continue with comfort cares, looking for placement with hospice.   Palliative Care will check back in Thursday.   Lizzy Mejia, CATHIE,CNP, Palliative Care

## 2023-11-08 NOTE — PLAN OF CARE
Problem: Pain Acute  Goal: Optimal Pain Control and Function  Outcome: Progressing  Intervention: Prevent or Manage Pain  Recent Flowsheet Documentation  Taken 11/7/2023 1700 by Joseph Mireles RN  Medication Review/Management: medications reviewed     Problem: Adult Inpatient Plan of Care  Goal: Optimal Comfort and Wellbeing  Outcome: Progressing   Goal Outcome Evaluation:         Patient drowsy and sleeping most of the shift. Arouses to voice and touch but would go back to sleep. Scheduled and PRN oxycodone oral solution given for pain. Unable to give other oral medications due to drowsiness. Oral swabs done with mouth care. Turned and repositioned every 2 hours. Purewick on. Bed alarms on for safety.

## 2023-11-08 NOTE — PLAN OF CARE
Goal Outcome Evaluation:    Problem: Adult Inpatient Plan of Care  Goal: Absence of Hospital-Acquired Illness or Injury  Intervention: Prevent Skin Injury  Recent Flowsheet Documentation  Taken 11/7/2023 6400 by Mayda Gan RN  Body Position:   turned   left     Problem: Adult Inpatient Plan of Care  Goal: Optimal Comfort and Wellbeing  Intervention: Monitor Pain and Promote Comfort  Problem: Orthopaedic Fracture  Goal: Optimal Pain Control and Function  Intervention: Manage Acute Orthopaedic-Related Pain    Pt is on comfort care. A&O to self only.  Dressing has dried marked scant sanguinous drainage. C/O severe pain.  Pain managed with scheduled and PRN Oxycodone solution.  Pt is assisted of 2 and Q2 turn. Pt stated of having less pain when turned more to her side. Oral care as tolerated with a swab. Fluid encouraged. Purewick in place. Bed alarm activated for safety.   Sarah Beth COLIN From [facility] was updated.

## 2023-11-09 NOTE — PLAN OF CARE
Goal Outcome Evaluation:      Problem: Adult Inpatient Plan of Care  Goal: Absence of Hospital-Acquired Illness or Injury  Intervention: Prevent Skin Injury    Problem: Adult Inpatient Plan of Care  Goal: Optimal Comfort and Wellbeing  Intervention: Monitor Pain and Promote Comfort      Pt is on comfort care. A&Ox 2 to self and place. Dressing clean, dry and intact. Slept comfortably throughout the night. Q2 turned. Purewick in place. Oral care provided. Safety alarm activated for safety.

## 2023-11-09 NOTE — PLAN OF CARE
Patient alert to self only. Able to voice out pain. A bit drowsy but able to take oral medications. On Comfort Cares. Repositioned every 2 hours.No new skin issues noted. No IV access. Pain managed with PRN and scheduled oxycodone. Pure wick in place. Bed alarms on for safety.

## 2023-11-09 NOTE — PROGRESS NOTES
[facility]    Medicine Progress Note - Hospitalist Service    Date of Admission:  10/26/2023      83-year-old with history of chronic dementia, osteoporosis with previous compression fractures of T12, L3, L4 and L5, memory care resident admitted for recurrent falls and pain of left hip.  Diagnosed with left hip fracture intertrochanteric proximal fracture.  [facility] performed left internal fixation on 10/27/2023.  Developed postoperative anemia which required transfusion and has been stable.  After meeting with palliative care provider today patient has elected for comfort care on 11/2/23.  Care management consulted for Hospice at care facility, and likely here over weekend.      Changes today:  -Pending placement at facility w/Hospice  -Ortho to remove sutures tomorrow.       Assessment & Plan   83-year-old with history of chronic dementia, osteoporosis with previous compression fractures of T12, L3, L4 and L5, memory care resident admitted for recurrent falls and pain of left hip.  Diagnosed with left hip fracture intertrochanteric proximal fracture.  [facility] performed left internal fixation on 10/27/2023.  Developed postoperative anemia which required transfusion and has been stable.  After meeting with palliative care provider today patient has elected for comfort care on 11/2/23. Pending placement at this time.     Palliative care enounter  Left hip fracture  Status post left internal fixation on 10/27.  Appreciate orthopedic recommendations.  Toe-touch weightbearing with walker  Activity as tolerated.   Fall precautions.  Pain management per palliative care.     Essential hypertension  -Stopped antihypertensives 11/7, patient not elevated 11/8. Family discussed on 11/8 and okay with not checking BP or starting antihypertensives. Explained that BP is likely to continue dropping with decreased PO intake as well as our meds for comfort.     Osteoporosis  Multiple  compression fractures of lumbar and thoracic spine.  Continue supportive care, and pain management.     Aortic stenosis  Moderate aortic stenosis on echocardiogram.  Regular follow-up with cardiology for monitoring.     Lewy body dementia/MSA  PTA medication: Sinemet.   Gabapentin 100 mg TID  Olanzapine as needed.   Haldol as needed.                Diet: Advance Diet as Tolerated: Regular Diet Adult    DVT Prophylaxis: Comfort care  Diaz Catheter: Not present  Lines: None     Cardiac Monitoring: None  Code Status: No CPR- Do NOT Intubate      Clinically Significant Risk Factors                  # Hypertension: Noted on problem list     # Dementia: noted on problem list               Disposition Plan      Expected Discharge Date: 11/10/2023    Discharge Delays: Placement - LTC  *Medically Ready for Discharge  Destination: assisted living  Discharge Comments: pending hospice placement            Elia Duckworth MD  Hospitalist Service  [facility]  Securely message with [employer] (more info)  Text page via [employer] Paging/Directory   ______________________________________________________________________    Interval History   No acute events overnight, saw patient this morning she reported she had some back pain and looked uncomfortable.  She had just received some as needed oxycodone 10 minutes prior.  On reevaluation she appeared comfortable.    Physical Exam   Vital Signs:                    Weight: 138 lbs 7.18 oz    Gen:Sitting semi upright in bed, appears in pain  PULM:Normal I/E effort on room air.    Medical Decision Making       25 MINUTES SPENT BY ME on the date of service doing chart review, history, exam, documentation & further activities per the note.        Data     Elia Duckworth MD  Hospitalist

## 2023-11-09 NOTE — PROGRESS NOTES
PALLIATIVE CARE PROGRESS NOTE  [facility]     Patient Name: Mehreen Camara  Date of Admission: 10/26/2023   Today the patient was seen for: goals of care and symptom management      Recommendations & Counseling      GOALS OF CARE:-Continue full comfort cares (started Nov. 2nd).    Saw pt sleeping comfortably this AM.  Spoke with MD and CM re: pts plan of care, symptom mgmt, and support for family.       Spoke with dtr Eladio by telephone today, to check in. Eladio wants her to get excellent care, preserve her dignity and hopes for pt not to suffer long. She appreciates knowing of a re-referral to [facility] in case pt could get a placement there. She is Protestant, and Eladio believes this could be a very good option if it worked. Pt has not had much to eat or drink over the last week and is seeming to be less agitated. Earlier this week, Eladio did share that pt often gets agitated in the afternoon around 4 (has done this for 1 year). Reviewed her pain and agitation medications in detail (Eladio is aware she has regularly scheduled Oxycodone q 4, Oxycodone q 1 hr PRN, scheduled Olanzapine as well as PRN Olanzapine; and Haldol PRN). Dtr Eladio does wonder about prognosis (very reasonable question). Reviewed difficult to say but may be weeks (?2), but again, unknown. If pt does get a room at [facility], dtr Eladio does inquire about getting a COVID test done per OLOP website request and also wonders about the logistics of getting pts hip sutures removed per Ortho tomorrow. Relayed these 2 inquires to MD. Eladio has a good support system. She [facility].      Appreciate CM continued assistance/work on discharge options.      ADVANCE CARE PLANNING:  Health care directive on file, with primary health care agent daughter Alysa and secondary daughter Irasema ROBETR on file, completed 6/2022 indicating her wish for no CPR and comfort focused  medical treatments.  Code status: No CPR- Do NOT Intubate     MEDICAL MANAGEMENT:   Comfort Care   Below are common symptoms routinely seen in patients with comfort focused goals and at the end of life. This patient may not currently exhibit all of these symptoms; however, if these were to occur our recommendations are as follows:      #Pain -thoracic and lumbar spine and right hip historically. Did not get Cymbalta nor Gabapentin on Nov. 7th 2nd somnolence but has since been able to take those PO medications. Pt does not have IV access now.  Continue Oxycodone concentrate continue 5 mg q4h. Today, adjusted the time so she gets an afternoon dose of scheduled Oxycodone at 1400 (plus has 1 hr PRN pain medications if needed).  Continue PRN Oxycodone concentrate SL 5-10 to q 1 hour as needed. Recommend not to use until 1 hour after scheduled medication.    Appreciate RN looking into an alternative bed mattress for pt.   Yesterday, increased lidocaine patch to 2 patches per day.      #Air hunger/Dyspnea, does not appear in any respiratory distress.    Oxycodone concentrate continue 5 mg q 4 hours    Oxycodone concentrate SL 5-10 q 1 hour as needed      #Secretion burden   Robinul 0.2 mg  q 4 hours as needed. If ineffective, increase up to 0.3 mg   Consider atropine if Robinul ineffective after dose increase      #Nausea   Zofran 4 mg q 6 hours as needed. Can increase to 8 mg   Zyprexa 5 mg q 8 hours as needed      #Agitation, in the setting of Lewy Body dementia.  Aggressive symptoms control first. Remains at times an issue.   Since patient exhibiting difficulty swallowing pills, has Haldol concentrate 2 mg SL every 4 hours as needed.  Seroquel PRN   Has Zyprexa 5 mg ODT BID and q8h prn. Will avoid IM. Could consider increase dose of Zyprexa to 10 mg.   Pt has had a labile course. Able to take PO/ODT medication the last 2 days. Will leave for now but if unable to take PO/ODT, consider a change to SL Haldol.     "  PSYCHOSOCIAL/SPIRITUAL SUPPORT:   ~ 10 years ago.  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      Palliative Care will continue to follow, chart check/peripherally, however if there is a new change, question, or need, please don't hesitate to call us at [phone].      Thank you for the consult and allowing us to aid in the care of Mehreen Camara.     Case discussed with MD,HAILEE Campbell, FNP-BC, PMHNP-BC  Palliative Care Nurse Practitioner  [facility]  [phone]     During regular M-F work hours -- if you are not sure who specifically to contact -- please contact us by calling [phone].'  Securely message with [employer] (more info)  Text page via [employer] Paging/Directory      TTS: I have personally spent a total of 50 minutes today on the unit in review of medical record, consultation with the medical providers and assessment of patient, with more than 50% of this time spent in counseling, coordination of care, and discussion with MD and MICHELLE re: symptom management, discharge options, emotional support (23 minutes on the telephone with ezio Hendricks, reviewing plan of care, symptom mgmt/timing of medications, emotional support), development of plan of care, and documentation. Pt was sleeping comfortably; not examined by this provider today.           Assessments           Per colleague, Mehreen Camara is a 83 year old female with a past medical history of multi-system atrophy, Lewy Body dementia, aortic stenosis, history of falls who presented on 10/26/23 with left hip pain and was found to have an acute left intertrochanteric proximal femur fracture.  She also has old compression fractures of T12, L3, L4, L5.  She underwent a right hip trochanteric nailing on 10/27. Has been followed by " outpatient palliative clinic, with last visit in 6/2023     Prognosis, Goals, & Planning:   Prognosis, Goals, and/or Advance Care Planning addressed today:  Continue comfort cares and medication titration for pain control.  OLP application with care management, re-referral done.  Palliative Performance Score:     20%- 1. Totally bed bound; 2. Unable to do any activity, extensive disease; 3. Total care; 4. Minimal to sips; 5. Full or drowsy +/- confusion      Coping, Meaning, & Spirituality:   Mood, coping, and/or meaning in the context of serious illness were addressed today: Discussed challenges of health care ad coping with dtr Eladio today.        Interval History:      Chart review/discussion with unit or clinical team members:   Pt sleeping, resting comfortably when saw briefly today.      Medications:  I have reviewed this patient's medication profile and medications from this hospitalization. Notable medications: Oxycodone, Haldol, Zyprexa, Lidocaine patch         Review of Systems:     Besides above, an additional ROS system was not done.           Physical Exam:      138 lbs 7.18 oz    Physical Exam  Pt observed to be sleeping comfortably. No distress.               Current Problem List:   Principal Problem:    Closed fracture of multiple ribs of both sides, initial encounter  Active Problems:    Lives in assisted living facility    Falls frequently    Displaced intertrochanteric fracture of left femur, initial encounter for closed fracture (H)    Senile dementia, with behavioral disturbance (H)    Osteoporotic compression fracture of spine, with routine healing, subsequent encounter    DNR (do not resuscitate)    History of recent blood transfusion    Acute blood loss as cause of postoperative anemia    Hospice care      Allergies   Allergen Reactions    Penicillins Anaphylaxis, Rash and Shortness Of Breath    Aspirin Nausea and GI Disturbance    Atenolol Cough    Atorvastatin Muscle Pain (Myalgia) and  Nausea and Vomiting    Bupropion Nausea    Cephalexin Rash     Localized to neck area    Clindamycin Other (See Comments)     Constipation     Codeine Nausea    Ibuprofen Nausea and GI Disturbance    Lovastatin Muscle Pain (Myalgia)

## 2023-11-09 NOTE — PLAN OF CARE
Note from [id number]. Per the advanced dementia pain scale and per pt, pain rated 0-8/10 during shift thus far. No new skin issues noted. Turning and repositioning every 2 hours. Pt quite pleasant today and took all her meds. Dressing is CDI. Poor appetite, encouraging oral intake. Education comprehension and physical assessment difficult to obtain d/t pt not being completely alert and oriented. No IV access. Purewick in place, voiding in small amounts. Education on medication administration and use of call-light to reduce risk for falls and injury. Alarms in place. No further issues noted.    Taylor R Schoenecker, RN

## 2023-11-09 NOTE — PROGRESS NOTES
Care Management Follow Up    Length of Stay (days): 14    Expected Discharge Date: 11/10/2023     Concerns to be Addressed: discharge planning     Patient plan of care discussed at interdisciplinary rounds: Yes    Anticipated Discharge Disposition: Assisted Living, Transitional Care     Anticipated Discharge Services: None  Anticipated Discharge DME: None    Patient/family educated on Medicare website which has current facility and service quality ratings: no  Education Provided on the Discharge Plan: Yes  Patient/Family in Agreement with the Plan: yes    Additional Information:  CM met with Palliative care. Palliative care feels that the pt is stable and the prognosis for the pt is progressing where the pt would be best at [facility] and requested a referral be resent. Referral resent as requested.     12:53 PM   CM spoke with RN who is requesting an update. RN to check in with family. CM called [facility] and reached voicemail.     3:42 PM  CM got an update request information be faxed to [facility]. CM sent more information as requested for [facility].     NAM Munroe

## 2023-11-10 NOTE — PROVIDER NOTIFICATION
Dr. Duckworth text paged at 1400.    #[id number], Lauren, Room 372, Pt's family requesting that an aqua k pad be ordered for comfort. Can you order this please? Thanks!    *Pump ordered.    Taylor R Schoenecker, RN

## 2023-11-10 NOTE — PROGRESS NOTES
PALLIATIVE CARE PROGRESS NOTE  [facility]     Patient Name: Mehreen Camara  Date of Admission: 10/26/2023   Today the patient was seen for: goals of care, symptom management     Recommendations & Counseling       GOALS OF CARE:   Continue comfort cares and medication adjustment for symptom management.  Care management assisting with discharge disposition.    ADVANCE CARE PLANNING:  Patient has an advance directive dated 11/29/2015.  Primary Health Care Agent daughter, Eladio Dowd.  Alternate(s) daughter, Irasema Rajan.   There is a POLST form on file, this was reviewed and current. DNR/I and comfort focused treatments.  Code status: No CPR- Do NOT Intubate    MEDICAL MANAGEMENT:    #Pain -thoracic and lumbar spine and right hip historically.   Intermittently not taking pills.  Continue Oxycodone concentrate continue 5 mg q4h.   Continue PRN Oxycodone concentrate SL 5-10 to q 1 hour as needed.    Patient has used 30 mg prn oxycodone in last 24 hours. If frequency of prn opiates continues or increases, consider increasing scheduled oxycodone to 10 mg po q4h.  Appreciate RN looking into an alternative bed mattress for pt.   Continue lidocaine patch to 2 patches per day.      #Air hunger/Dyspnea, does not appear in any respiratory distress.    Oxycodone concentrate continue 5 mg q 4 hours    Oxycodone concentrate SL 5-10 q 1 hour as needed      #Secretion burden   Robinul 0.2 mg  q 4 hours as needed. If ineffective, increase up to 0.3 mg   Consider atropine if Robinul ineffective after dose increase - try to avoid anticholinergics as can increase delirium.     #Nausea   Zofran 4 mg q 6 hours as needed. Can increase to 8 mg   Zyprexa 5 mg q 8 hours as needed      #Agitation, in the setting of Lewy Body dementia.  Aggressive symptoms control first. Remains at times an issue.   Since patient exhibiting difficulty swallowing pills, has Haldol concentrate 2 mg SL every 4 hours as needed.  None given.  Seroquel 12.5  mg po TID PRN - no ne given since 11/7  Has Zyprexa 5 mg ODT BID and q8h prn. Will avoid IM. Could consider increase dose of Zyprexa to 10 mg.   Pt has had a labile course. Able to take PO/ODT medication the last 2 days. Will leave for now but if unable to take PO/ODT, consider a change to SL Haldol.     PSYCHOSOCIAL/SPIRITUAL:  Family     - 10 years. 5 children. Daughter, Eladio most involved.  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     Palliative Care will continue to follow. Thank you for the consult and allowing us to aid in the care of Mehreen Camara.    HAILEE Avila, CNS, AOCNS  Securely message with [employer] (more info)  Text page via [facility] Paging/Directory         Assessments      Notes and chart reviewed.       Per colleague, Mehreen Camara is a 83 year old female with a past medical history of multi-system atrophy, Lewy Body dementia, aortic stenosis, history of falls who presented on 10/26/23 with left hip pain and was found to have an acute left intertrochanteric proximal femur fracture.  She also has old compression fractures of T12, L3, L4, L5.  She underwent a right hip trochanteric nailing on 10/27. Has been followed by outpatient palliative clinic, with last visit in 6/2023        Prognosis, Goals, & Planning:   Prognosis, Goals, and/or Advance Care Planning addressed today:  Continue comfort cares          Review of Systems: Unable to assess as unable to communicate with patient.            Physical Exam:      138 lbs 7.18 oz    General appearance: fatigued and no distress  Head: Normocephalic, without obvious abnormality, atraumatic  Eyes: lids and lashes normal  Nose: no discharge  Throat: lips without lesions. Oral mucosa moist  Lungs: clear to auscultation bilaterally  Heart: regular rate and rhythm  Abdomen: soft, non-distended  Extremities: warm, no edema  Neurologic: opened eyes briefly. Did not  answer questions           Current Problem List:   Principal Problem:    Closed fracture of multiple ribs of both sides, initial encounter  Active Problems:    Lives in assisted living facility    Falls frequently    Displaced intertrochanteric fracture of left femur, initial encounter for closed fracture (H)    Senile dementia, with behavioral disturbance (H)    Osteoporotic compression fracture of spine, with routine healing, subsequent encounter    DNR (do not resuscitate)    History of recent blood transfusion    Acute blood loss as cause of postoperative anemia    Hospice care      Allergies   Allergen Reactions    Penicillins Anaphylaxis, Rash and Shortness Of Breath    Aspirin Nausea and GI Disturbance    Atenolol Cough    Atorvastatin Muscle Pain (Myalgia) and Nausea and Vomiting    Bupropion Nausea    Cephalexin Rash     Localized to neck area    Clindamycin Other (See Comments)     Constipation     Codeine Nausea    Ibuprofen Nausea and GI Disturbance    Lovastatin Muscle Pain (Myalgia)       30 MINUTES SPENT BY ME on the date of service doing chart review, history, exam, documentation & further activities per the note.

## 2023-11-10 NOTE — PROGRESS NOTES
[facility]    Medicine Progress Note - Hospitalist Service    Date of Admission:  10/26/2023      83-year-old with history of chronic dementia, osteoporosis with previous compression fractures of T12, L3, L4 and L5, memory care resident admitted for recurrent falls and pain of left hip.  Diagnosed with left hip fracture intertrochanteric proximal fracture.  [facility] performed left internal fixation on 10/27/2023.  Developed postoperative anemia which required transfusion and has been stable.  After meeting with palliative care provider today patient has elected for comfort care on 11/2/23.  Care management consulted for Hospice at care facility, and likely here over weekend.      Changes today:  -Pending placement at facility w/Hospice  -Ortho to remove sutures today      Assessment & Plan   83-year-old with history of chronic dementia, osteoporosis with previous compression fractures of T12, L3, L4 and L5, memory care resident admitted for recurrent falls and pain of left hip.  Diagnosed with left hip fracture intertrochanteric proximal fracture.  [facility] performed left internal fixation on 10/27/2023.  Developed postoperative anemia which required transfusion and has been stable.  After meeting with palliative care provider today patient has elected for comfort care on 11/2/23. Pending placement at this time.     Palliative care enounter  Left hip fracture  Status post left internal fixation on 10/27.  Appreciate orthopedic recommendations.  Toe-touch weightbearing with walker  Activity as tolerated.   Fall precautions.  Pain management per palliative care.     Essential hypertension  -Stopped antihypertensives 11/7, patient not elevated 11/8. Family discussed on 11/8 and okay with not checking BP or starting antihypertensives. Explained that BP is likely to continue dropping with decreased PO intake as well as our meds for comfort.     Osteoporosis  Multiple  compression fractures of lumbar and thoracic spine.  Continue supportive care, and pain management.     Aortic stenosis  Moderate aortic stenosis on echocardiogram.  Regular follow-up with cardiology for monitoring.     Lewy body dementia/MSA  PTA medication: Sinemet.   Gabapentin 100 mg TID  Olanzapine as needed.   Haldol as needed.                Diet: Advance Diet as Tolerated: Regular Diet Adult    DVT Prophylaxis: Comfort care  Diaz Catheter: Not present  Lines: None     Cardiac Monitoring: None  Code Status: No CPR- Do NOT Intubate      Clinically Significant Risk Factors                  # Hypertension: Noted on problem list     # Dementia: noted on problem list               Disposition Plan      Expected Discharge Date: 11/11/2023    Discharge Delays: Placement - LTC  *Medically Ready for Discharge  Destination: assisted living  Discharge Comments: pending hospice placement            Elia Duckworth MD  Hospitalist Service  [facility]  Securely message with [employer] (more info)  Text page via [employer] Paging/Directory   ______________________________________________________________________    Interval History   No acute events overnight, saw patient this morning, she did not appear in distress but she did report she had some back pain, nurse was able to give Prn with good relief.    Physical Exam   Vital Signs:                    Weight: 138 lbs 7.18 oz    NAD, laying semi upright in bed    Medical Decision Making       20 MINUTES SPENT BY ME on the date of service doing chart review, history, exam, documentation & further activities per the note.        Data     Elia Duckworth MD  Hospitalist

## 2023-11-10 NOTE — PLAN OF CARE
Note from [id number]. Per the advanced dementia pain scale and per pt, pain rated 0-8/10 during shift thus far. No new skin issues noted. Turning and repositioning every 2 hours. Pt quite pleasant today and took all her meds. Dressing and sutures removed by ortho. Poor appetite, encouraging oral intake. Small BM this shift. Education comprehension and physical assessment difficult to obtain d/t pt not being completely alert and oriented. No IV access. Purewick in place, voiding in smaller amounts. Education on medication administration and use of call-light to reduce risk for falls and injury. Alarms in place. No further issues noted.    Taylor R Schoenecker, RN

## 2023-11-11 NOTE — PLAN OF CARE
Problem: Adult Inpatient Plan of Care  Goal: Plan of Care Review  Description: The Plan of Care Review/Shift note should be completed every shift.  The Outcome Evaluation is a brief statement about your assessment that the patient is improving, declining, or no change.  This information will be displayed automatically on your shift  note.  Outcome: Progressing   Patient is alert, confused, pain managed by scheduled medications this evening, appears comfortable, aqua pad utilized per family request, turned and repositioned, dressing on coccyx is clean, dry and intact.

## 2023-11-11 NOTE — PROGRESS NOTES
Care Management Follow Up    Length of Stay (days): 16    Expected Discharge Date: 11/12/2023     Concerns to be Addressed: discharge planning         Additional Information:  Writer reviewed chart, still awaiting [facility] response-unfortunately closed on W/E.    CM/WILLIS follow along.    Leia Mike CM

## 2023-11-11 NOTE — PLAN OF CARE
Problem: Adult Inpatient Plan of Care  Goal: Optimal Comfort and Wellbeing  Outcome: Progressing     Problem: Pain Acute  Goal: Optimal Pain Control and Function  Outcome: Progressing     Problem: Palliative Care  Goal: Enhanced Quality of Life  Outcome: Progressing    Patient is alert to self only, reorientation provided throughout the shift. Patient has been very drowsy and difficult to arouse today. She was only able to take her levothyroxine and carbidopa medications due to drowsiness. MD was notified and ordered PRN IV levothyroxine in case patient is unable to take this medication tomorrow. Breath sounds are shallow and diminished. Patient has not been out of bed today, Q2 turns and pillows used to decrease risk for skin breakdown. Voiding adequately with purewik in place. Lidocaine patches in place on the patient's lower back and left hip to help with pain. No new skin issues noted. Mepilex in place on the patient's coccyx, CDI.

## 2023-11-11 NOTE — PROGRESS NOTES
[facility]    Medicine Progress Note - Hospitalist Service    Date of Admission:  10/26/2023  83-year-old with history of chronic dementia, osteoporosis with previous compression fractures of T12, L3, L4 and L5, memory care resident admitted for recurrent falls and pain of left hip.  Diagnosed with left hip fracture intertrochanteric proximal fracture.      [facility] performed left internal fixation on 10/27/2023.  Developed postoperative anemia which required transfusion and has been stable.  After meeting with palliative care provider, patient has elected for comfort care on 11/2/23.  Care management consulted for Hospice at care facility, and likely here over weekend. And ortho removed sutures 11/10.    New to writer 11/11/2023. Pending placement at facility w/Hospice. Concern for swallowing, a conditional prn for levothyroxine is available (30% convert po to iv).    Assessment & Plan   Palliative care enounter  Left hip fracture  Status post left internal fixation on 10/27.  Appreciate orthopedic recommendations.  Toe-touch weightbearing with walker  Activity as tolerated.   Fall precautions.  Pain management per palliative care.     Essential hypertension  -Stopped antihypertensives 11/7, patient not elevated 11/8. Family discussed on 11/8 and okay with not checking BP or starting antihypertensives. Explained previously that BP is likely to continue dropping with decreased PO intake as well as our meds for comfort.  -stable bp     Osteoporosis  Multiple compression fractures of lumbar and thoracic spine.  Continue supportive cares, and pain management.     Aortic stenosis  Moderate aortic stenosis on echocardiogram.  Regular follow-up with cardiology for monitoring.     Lewy body dementia/MSA  PTA medication: Sinemet.   Gabapentin 100 mg TID  Olanzapine as needed.   Haldol as needed.     Hypothyroidism  A/p- on synthroid 100mcg  - for 11/11/2023, concern for tolerating po  "intake-- conditional prn for levothyroxine is available (30% convert po to iv)              Diet: Advance Diet as Tolerated: Regular Diet Adult    DVT Prophylaxis: Comfort care  Diaz Catheter: Not present  Lines: None     Cardiac Monitoring: None  Code Status: No CPR- Do NOT Intubate      Clinically Significant Risk Factors                  # Hypertension: Noted on problem list     # Dementia: noted on problem list               Disposition Plan      Expected Discharge Date: 11/12/2023    Discharge Delays: Placement - LTC  *Medically Ready for Discharge  Destination: assisted living  Discharge Comments: pending hospice placement            Xavier Rhodes MD  Hospitalist Service  [facility]  Securely message with [email or web address] (more info)  Text page via [employer] Paging/Directory   ______________________________________________________________________    Interval History   No acute events overnight, saw patient this morning, she did not appear in distress but was very somnolent.  She did state \"no\" when asked if she has any new pain, symptoms, or questions  Discussed w/ bedside RN- swallowing concern; discussed conditional prn for levothyroxine is available (30% convert po to iv).    Physical Exam   Vital Signs:                    Weight: 138 lbs 7.18 oz    NAD, laying semi upright in bed  Pulm: ctab  Cvs: rrr, stable  Abd: no signs of pain with gentle palpation     Medical Decision Making     49 MINUTES SPENT BY ME on the date of service doing chart review, history, exam, documentation & further activities per the note.        Data       "

## 2023-11-11 NOTE — PLAN OF CARE
"  Problem: Adult Inpatient Plan of Care  Goal: Patient-Specific Goal (Individualized)  Description: You can add care plan individualizations to a care plan. Examples of Individualization might be:  \"Parent requests to be called daily at 9am for status\", \"I have a hard time hearing out of my right ear\", or \"Do not touch me to wake me up as it startles  me\".  Outcome: Not Progressing   Goal Outcome Evaluation:    Patient repositioned frequently throughout night to promote skin integrity.   External catheter in place to promote skin integrity due to incontinence.   Comfort care measures maintained.   Dressing on coccyx remains in tact. L hip site open to air without sutures.  Scheduled sublingual oxycodone given.   Patient alert to self only with slow speech.   Bed alarm on for patient safety.     "

## 2023-11-12 NOTE — PROGRESS NOTES
[facility]    Medicine Progress Note - Hospitalist Service    Date of Admission:  10/26/2023  83-year-old with history of chronic dementia, osteoporosis with previous compression fractures of T12, L3, L4 and L5, memory care resident admitted for recurrent falls and pain of left hip.  Diagnosed with left hip fracture intertrochanteric proximal fracture.      [facility] [place] [facility] performed left internal fixation on 10/27/2023.  Developed postoperative anemia which required transfusion and has been stable.  After meeting with palliative care provider, patient has elected for comfort care on 11/2/23.  Care management consulted for Hospice at care facility, and likely here over weekend. And ortho removed sutures 11/10.    Pending placement at facility w/Hospice. Concern for swallowing given intermittent somnolence, a conditional prn for levothyroxine is available (30% convert po to iv).     Discussed w/ SW again.    Assessment & Plan   Palliative care enounter  Left hip fracture  Status post left internal fixation on 10/27.  Appreciate orthopedic recommendations.  Toe-touch weightbearing with walker  Activity as tolerated.   Fall precautions.  Pain management per palliative care.     Essential hypertension  -Stopped antihypertensives 11/7, patient not elevated 11/8. Family discussed on 11/8 and okay with not checking BP or starting antihypertensives. Explained previously that BP is likely to continue dropping with decreased PO intake as well as our meds for comfort.  -stable bp     Osteoporosis  Multiple compression fractures of lumbar and thoracic spine.  Continue supportive cares, and pain management.     Aortic stenosis  Moderate aortic stenosis on echocardiogram.  Regular follow-up with cardiology for monitoring.     Lewy body dementia/MSA  PTA medication: Sinemet.   Gabapentin 100 mg TID  Olanzapine as needed.   Haldol as needed.     Hypothyroidism  A/p- on synthroid 100mcg  - for 11/11/2023,  concern for tolerating po intake-- conditional prn for levothyroxine is available (30% convert po to iv)            Diet: Advance Diet as Tolerated: Regular Diet Adult    DVT Prophylaxis: Comfort care  Diaz Catheter: Not present  Lines: None     Cardiac Monitoring: None  Code Status: No CPR- Do NOT Intubate      Clinically Significant Risk Factors                  # Hypertension: Noted on problem list     # Dementia: noted on problem list               Disposition Plan     Expected Discharge Date: 11/13/2023    Discharge Delays: Placement - LTC  *Medically Ready for Discharge  Destination: assisted living  Discharge Comments: pending hospice placement            Xavier Rhodes MD  Hospitalist Service  [facility]  Securely message with [employer] (more info)  Text page via [employer] Paging/Directory   ______________________________________________________________________    Interval History   No acute events overnight  Pt is resting peacefully this morning, arousable to care team  Discussed w/ bedside RN- if any recurrent swallowing concern, to give iv levothyroxine and page further if any further issues      Physical Exam   Vital Signs:                    Weight: 138 lbs 7.18 oz    NAD, laying semi upright in bed, sleeping   Pulm: ctab  Cvs: rrr, stable  Abd: no signs of pain with gentle palpation     Medical Decision Making     30 MINUTES SPENT BY ME on the date of service doing chart review, history, exam, documentation & further activities per the note.        Data

## 2023-11-12 NOTE — PLAN OF CARE
Goal Outcome Evaluation:  Patient alert, oriented only to self, re-oriented prn.  Patient pleasant, taking medications crushed when able to do so with applesauce.  Fluids offered on rounds.  Patient reported pain moderate in left leg and back, given prn and scheduled oxycodone per orders with relief.  Patient resting post pain medications and reports pain mild post.  Patient's coccyx has redness, sacral mepilex in place.  Bilateral heels have blanchable erythema and mepilex dressings applied to bilateral heels and floated with pillows.  Patient repositioned q2h.  Patient had incontinence of bowel and bladder, incontinence care provided and brief changed.  Purewick in place.  Patient now resting in bed.

## 2023-11-12 NOTE — PLAN OF CARE
Alert to self. Pt is drowsy, responds to voice. Able to communicate pain; says she's in a lot of pain, especially her bottom. PRN oxy administered. Senna held this morning d/t loose BM yesterday. 1 BM today, soft, dark brown. Incontinence care provided. Mepilex changed. Purwick changed; dark urine output. Turned Q2H, heels floated with pillows.    Goal Outcome Evaluation:    Problem: Risk for Delirium  Goal: Improved Behavioral Control  Intervention: Minimize Safety Risk  Recent Flowsheet Documentation  Taken 11/12/2023 0910 by Valentina Luke, RN  Enhanced Safety Measures: room near unit station     Problem: Pain Acute  Goal: Optimal Pain Control and Function  Outcome: Progressing  Intervention: Prevent or Manage Pain  Recent Flowsheet Documentation  Taken 11/12/2023 0910 by Valentina Luke, RN  Sensory Stimulation Regulation:   care clustered   quiet environment promoted   visual stimulation minimized  Medication Review/Management: medications reviewed

## 2023-11-12 NOTE — PROGRESS NOTES
Pt very lethargic and drowsy overnight. No PRN pain medications given. Scheduled oxy given. Q2 hour turns maintained, heels floated constantly. Mepilex on bottom & heels CDI. No stool episodes on shift. Purewick in place with only 200 ml of dark urine out on shift. Fluids encouraged. Able to get pt drink approx 50 ml. No other acute changes on shift.

## 2023-11-13 NOTE — PROGRESS NOTES
PALLIATIVE CARE PROGRESS NOTE  [facility]     Patient Name: Mehreen Camara  Date of Admission: 10/26/2023   Today the patient was seen for: goals of care, symptom management     Recommendations & Counseling     GOALS OF CARE:   Continue comfort cares and medication adjustment for symptom management.  Care management assisting with discharge disposition.  Reevaluating for possibility of [facility].     ADVANCE CARE PLANNING:  Patient has an advance directive dated 11/29/2015.  Primary Health Care Agent daughter, Eladio Dowd.  Alternate(s) daughter, Irasema Rajan.   There is a POLST form on file, this was reviewed and current. DNR/I and comfort focused treatments.  Code status: No CPR- Do NOT Intubate     MEDICAL MANAGEMENT:    #Pain -thoracic and lumbar spine and right hip historically.   Intermittently not taking pills.  Continue Oxycodone concentrate continue 5 mg q4h.   Continue PRN Oxycodone concentrate SL 5-10 to q 1 hour as needed.    Patient has used 20 mg prn oxycodone in last 24 hours.   Continue lidocaine patch to 2 patches per day.      #Air hunger/Dyspnea, does not appear in any respiratory distress.    Continue Oxycodone concentrate continue 5 mg q 4 hours    Oxycodone concentrate SL 5-10 q 1 hour as needed - 20 mg given in last 24 hours     #Secretion burden   Robinul 0.2 mg  q 4 hours as needed. If ineffective, increase up to 0.3 mg   Consider atropine if Robinul ineffective after dose increase - try to avoid anticholinergics as can increase delirium.     #Nausea   Zofran 4 mg q 6 hours as needed. Can increase to 8 mg   Zyprexa 5 mg q 8 hours as needed      #Agitation, in the setting of Lewy Body dementia - controlled presently  FIRST: Zyprexa 5 mg ODT BID and q8h prn. Will avoid IM. Could consider increase dose of Zyprexa to 10 mg.   Should patient have difficulty taking Zyprexa ODT, use Haldol concentrate 2 mg SL every 4 hours as needed. None given.  Seroquel  12.5  mg po TID PRN - none given      PSYCHOSOCIAL/SPIRITUAL:  Family     - 10 years. 5 children. Daughter, Eladio most involved.  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 Thank you for the consult and allowing us to aid in the care of Mehreen Camara.    LUMA Booker, APRN, CNS, AOCNS  Securely message with THE MELTmore info)  Text page via [facility] Paging/Directory         Assessments        Notes and chart reviewed.  Mehreen Camara is a 83 year old female with a past medical history of multi-system atrophy, Lewy Body dementia, aortic stenosis, history of falls who presented on 10/26/23 with left hip pain and was found to have an acute left intertrochanteric proximal femur fracture.  She also has old compression fractures of T12, L3, L4, L5.  She underwent a right hip trochanteric nailing on 10/27. Has been followed by outpatient palliative clinic, with last visit in 6/2023.  Patient continues on comfort cares while care management team investigates discharge disposition with hospice services.  Continuing with comfort focused treatments and medications for symptom management at this time.       Interval History:     Chart review/discussion with unit or clinical team members:   Calm this AM.  No distress.  Afebrile.           Review of Systems:     ROS was reviewed.  Pain: Denied pain.  Appears comfortable  Weakness/fatigue: Severe, bedbound  Constipation: BM 11/12/2023            Physical Exam:   Temp:  [98.4  F (36.9  C)] 98.4  F (36.9  C)  Pulse:  [70] 70  Resp:  [18] 18  BP: (153)/(70) 153/70  138 lbs 7.18 oz    General appearance: alert, cooperative, and no distress  Head: Normocephalic, without obvious abnormality, atraumatic  Eyes: lids and lashes normal. Sclera anicteric  Nose: no discharge  Throat: lips, mucosa, and tongue normal; teeth and gums normal  Lungs: clear to auscultation bilaterally  Heart:  Systolic murmur noted along left sternal border  Abdomen: Soft, nontender, nondistended,+BS  Neurologic: alert. Calm. Answered simple questions. Oriented to self only           Current Problem List:   Principal Problem:    Closed fracture of multiple ribs of both sides, initial encounter  Active Problems:    Lives in assisted living facility    Falls frequently    Displaced intertrochanteric fracture of left femur, initial encounter for closed fracture (H)    Senile dementia, with behavioral disturbance (H)    Osteoporotic compression fracture of spine, with routine healing, subsequent encounter    DNR (do not resuscitate)    History of recent blood transfusion    Acute blood loss as cause of postoperative anemia    Hospice care      Allergies   Allergen Reactions    Penicillins Anaphylaxis, Rash and Shortness Of Breath    Aspirin Nausea and GI Disturbance    Atenolol Cough    Atorvastatin Muscle Pain (Myalgia) and Nausea and Vomiting    Bupropion Nausea    Cephalexin Rash     Localized to neck area    Clindamycin Other (See Comments)     Constipation     Codeine Nausea    Ibuprofen Nausea and GI Disturbance    Lovastatin Muscle Pain (Myalgia)       30 MINUTES SPENT BY ME on the date of service doing chart review, history, exam, documentation & further activities per the note.

## 2023-11-13 NOTE — PROGRESS NOTES
Care Management Follow Up    Length of Stay (days): 18    Expected Discharge Date: 11/13/2023     Concerns to be Addressed: discharge planning         Additional Information:  SW left message for [facility] admissions inquiring about bed availability; requested call to be returned.   11:07 AM    SW spoke with admissions at [facility] who is re-reviewing for LTC on hospice. SW following.  11:10 AM    SW spoke with admissions at [facility] who is requested updated notes; notes sent and waiting for call to be returned after review. SW following.  2:26 PM    NIKKI Hunt  11/13/2023

## 2023-11-13 NOTE — PROGRESS NOTES
Patient is alert to self, responds to voice but appears guarded. She denies pain but strikes, moans and grimaces during repositioning, PO scheduled Oxycodone administered for pain control.   Sacral/Buttocks region has mepilex, it is clean, dry and intact. Repositioning done every 2 hours. Purewick in place for comfort, urine is dark brown and odorous.  Lidocaine patches removed from back and left hip.   She is awaiting placement in hospice care.    [id number]

## 2023-11-13 NOTE — PLAN OF CARE
Goal Outcome Evaluation:    Pt is on RA. Pt on comfort cares waiting hospice placement. Pt took her morning meds, then was too tired to later on. Pt is Q2 turns. Pt has not eaten today but has a regular diet.

## 2023-11-13 NOTE — PROGRESS NOTES
[facility]    Medicine Progress Note - Hospitalist Service    Date of Admission:  10/26/2023  83-year-old with history of chronic dementia, osteoporosis with previous compression fractures of T12, L3, L4 and L5, memory care resident admitted for recurrent falls and pain of left hip.  Diagnosed with left hip fracture intertrochanteric proximal fracture.      [place] [facility] performed left internal fixation on 10/27/2023.  Developed postoperative anemia which required transfusion and has been stable.  After meeting with palliative care provider, patient has elected for comfort care on 11/2/23.  Care management consulted for Hospice at care facility, and likely here over weekend. And ortho removed sutures 11/10.    Pending placement at facility w/Hospice. Concern for swallowing given intermittent somnolence, a conditional prn for levothyroxine is available (30% convert po to iv); so far has not needed but available.     Discussed w/ SW in their office; pending.    Assessment & Plan   Palliative care enounter  Left hip fracture  Status post left internal fixation on 10/27.  Appreciate orthopedic recommendations.  Toe-touch weightbearing with walker  Activity as tolerated.   Fall precautions.  Pain management per palliative care.     Essential hypertension  -Stopped antihypertensives 11/7, patient not elevated 11/8. Family discussed on 11/8 and okay with not checking BP or starting antihypertensives. Explained previously that BP is likely to continue dropping with decreased PO intake as well as our meds for comfort.  -stable bp     Osteoporosis  Multiple compression fractures of lumbar and thoracic spine.  Continue supportive cares, and pain management.     Aortic stenosis  Moderate aortic stenosis on echocardiogram.  Regular follow-up with cardiology for monitoring.     Lewy body dementia/MSA  PTA medication: Sinemet.   Gabapentin 100 mg TID  Olanzapine as needed.   Haldol as needed.    "  Hypothyroidism  A/p- on synthroid 100mcg  - some mornings there are concerns for tolerating po intake-- conditional prn for levothyroxine is available (30% convert po to iv), but so far has not needed           Diet: Advance Diet as Tolerated: Regular Diet Adult    DVT Prophylaxis: Comfort care  Diaz Catheter: Not present  Lines: None     Cardiac Monitoring: None  Code Status: No CPR- Do NOT Intubate      Clinically Significant Risk Factors                  # Hypertension: Noted on problem list     # Dementia: noted on problem list               Disposition Plan      Expected Discharge Date: 11/13/2023    Discharge Delays: Placement - LTC  *Medically Ready for Discharge  Destination: assisted living  Discharge Comments: pending hospice placement            Xavier Rhodes MD  Hospitalist Service  [facility]  Securely message with rollApp (more info)  Text page via [employer] Paging/Directory   ______________________________________________________________________    Interval History   No acute events overnight  Pt is resting and alert this morning  She states she has no new pain or concerns  She does state \"I have memory issues\" but has no further questions  Checked in w/ bedside team, discussed w/ charge rn, help to order some breakfast  Discussed w/ sw     Physical Exam   Vital Signs: Temp: 98.4  F (36.9  C) Temp src: Axillary BP: (!) 153/70 Pulse: 70   Resp: 18        Weight: 138 lbs 7.18 oz    NAD, laying in bed, alert, responds with short answers   Pulm: ctab  Cvs: rrr, stable  Abd: no signs of pain with gentle palpation     Medical Decision Making     29 MINUTES SPENT BY ME on the date of service doing chart review, history, exam, documentation & further activities per the note.        Data       "

## 2023-11-14 NOTE — PLAN OF CARE
Goal Outcome Evaluation:  Problem: Adult Inpatient Plan of Care  Goal: Absence of Hospital-Acquired Illness or Injury  Intervention: Prevent Skin Injury  Problem: Adult Inpatient Plan of Care  Goal: Optimal Comfort and Wellbeing  Intervention: Monitor Pain and Promote Comfort  Patient is alert to self. Re-orientation provided. Scheduled Oxycodone administered for pain control. Turned and repositioned for comfort and to prevent skin breakdown. Bed alarms activated for safety.

## 2023-11-14 NOTE — PLAN OF CARE
Goal Outcome Evaluation:      Plan of Care Reviewed With: patient    Overall Patient Progress: decliningOverall Patient Progress: declining    Outcome Evaluation: No VS. Discharging to hospice. Fed breakfast. Q2 Turns to promote skin integrity. Frequent rounding.      Problem: Orthopaedic Fracture  Goal: Absence of Bleeding  Outcome: Unable to Meet     Problem: Orthopaedic Fracture  Goal: Bowel Elimination  Outcome: Unable to Meet     Problem: Orthopaedic Fracture  Goal: Optimal Pain Control and Function  Outcome: Unable to Meet

## 2023-11-14 NOTE — PLAN OF CARE
Problem: Adult Inpatient Plan of Care  Goal: Absence of Hospital-Acquired Illness or Injury  Intervention: Prevent Skin Injury  Recent Flowsheet Documentation  Taken 11/13/2023 2016 by Yanique Gordon RN  Body Position:   turned   left   heels elevated  Taken 11/13/2023 1700 by Yanique Gordon RN  Body Position:   turned   right     Problem: Palliative Care  Goal: Enhanced Quality of Life  Outcome: Progressing  Intervention: Maximize Comfort  Recent Flowsheet Documentation  Taken 11/13/2023 2016 by Yanique Gordon RN  Oral Care: lip/mouth moisturizer applied  Taken 11/13/2023 1945 by Yanique Gordon RN  Pain Management Interventions: (repositioning effective per patient)   back rub   care clustered   distraction   emotional support   medication offered but refused   pain management plan reviewed with patient/caregiver   pillow support provided   quiet environment facilitated   repositioned   rest  Taken 11/13/2023 1700 by Yanique Gordon RN  Oral Care:   lip/mouth moisturizer applied   tongue brushed       Patient had an episode of stool incontinence this afternoon. Emily-care provided, linens changed, and new clean mepilex applied to patient's coccyx. Patient has a blanchable reddened area on her coccyx. Q2 turns provided throughout the shift to reduce the risk for skin breakdown. Pillows applied under patient's arms, and heels suspended while in bed. Purewik in place for urinary incontinence. Patient is alert to self only, frequent reorientation provided throughout the shift. Tray set up and assistance with feeding provided. Scheduled oxycodone, rest, and repositioning used to help manage patient's pain.

## 2023-11-14 NOTE — PROGRESS NOTES
Care Management Follow Up    Length of Stay (days): 19    Expected Discharge Date: 11/15/2023     Concerns to be Addressed: discharge planning         Additional Information:  SW left message for admissions at [facility] who is requested updated notes yesterday afternoon; waiting for call to be returned after review. WILLIS met with family in room to discuss other LTC options; additional referrals sent and pending.  2:21 PM    NIKKI Hunt  11/14/2023

## 2023-11-14 NOTE — PROGRESS NOTES
Patient was seen on quarterly skin rounds today. Skin champions identified a possible skin issue to the coccyx. Patient was evaluated by [facility], noted MASD with maceration to the gluteal cleft and no pressure injury. Patient was offloaded to the Left side with pillows. Family at bedside.     PREETI BrowerN RN CWOCN  Pager no longer in use, please contact through sailsquare group: [facility] [employer]

## 2023-11-14 NOTE — PROGRESS NOTES
PALLIATIVE CARE PROGRESS NOTE  [facility]     Patient Name: Mehreen Camara  Date of Admission: 10/26/2023   Today the patient was seen for: Symptom management, goals of care     Recommendations & Counseling     GOALS OF CARE:   Continue comfort cares and medication adjustment for symptom management.  Care management assisting with discharge disposition.  Reevaluating for [facility] versus LTC with Hospice.     ADVANCE CARE PLANNING:  Patient has an advance directive dated 11/29/2015.  Primary Health Care Agent daughter, Eladio Dowd.  Alternate(s) daughter, Irasema Rajan.   There is a POLST form on file, this was reviewed and current. DNR/I and comfort focused treatments.  Code status: No CPR- Do NOT Intubate     MEDICAL MANAGEMENT:    #Pain -thoracic and lumbar spine and right hip historically.   Intermittently not taking pills.  Continue Oxycodone concentrate continue 5 mg q4h.   Continue PRN Oxycodone concentrate SL 5-10 to q 1 hour as needed.    Patient has used one dose of 10 mg prn oxycodone in last 24 hours.   Continue lidocaine patch to 2 patches per day.   Tylenol 650 mg po q4h prn. None given     #Air hunger/Dyspnea, does not appear in any respiratory distress.    Continue Oxycodone concentrate continue 5 mg q 4 hours    Oxycodone concentrate SL 5-10 q 1 hour as needed - 20 mg given in last 24 hours     #Secretion burden   Robinul 0.2 mg  q 4 hours as needed. If ineffective, increase up to 0.3 mg   Consider atropine if Robinul ineffective after dose increase - try to avoid anticholinergics as can increase delirium.     #Nausea   Zofran 4 mg q 6 hours as needed. Can increase to 8 mg   Zyprexa 5 mg q 8 hours as needed      #Agitation, in the setting of Lewy Body dementia - controlled presently  FIRST: Zyprexa 5 mg ODT BID and q8h prn. Will avoid IM. Could consider increase dose of Zyprexa to 10 mg.   Should patient have difficulty taking Zyprexa ODT, use Haldol  concentrate 2 mg SL every 4 hours as needed. None given.  Seroquel 12.5  mg po TID PRN - none given     #Bowel regimen - while on scheduled opiates BM x2 11/13/2023  Senna- pericolace 1 tab po BID - taking once daily; change to daily dosing  Bisacodyl 10 mg suppository daily prn.      PSYCHOSOCIAL/SPIRITUAL:  Family     - 10 years. 5 children. Daughter, Eladio most involved.  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 Thank you for the consult and allowing us to aid in the care of Mehreen Camara.    LUMA Booker, HAILEE, CNS, AOCNS  Securely message with zoomsquare (more info)  Text page via [facility] Paging/Directory         Assessments           Mehreen Camara is a 83 year old female with a past medical history of multi-system atrophy, Lewy Body dementia, aortic stenosis, history of falls who presented on 10/26/23 with left hip pain and was found to have an acute left intertrochanteric proximal femur fracture.  She also has old compression fractures of T12, L3, L4, L5.  She underwent a right hip trochanteric nailing on 10/27. Has been followed by outpatient palliative clinic, with last visit in 6/2023.  Patient continues on comfort cares while care management team investigates discharge disposition with hospice services.  Continuing with comfort focused treatments and medications for symptom management at this time.      Prognosis, Goals, & Planning:   Prognosis, Goals, and/or Advance Care Planning addressed today:  Palliative Performance Score:     20%- 1. Totally bed bound; 2. Unable to do any activity, extensive disease; 3. Total care; 4. Minimal to sips; 5. Full or drowsy +/- confusion   Connected with daughter, Eladio, today.  She and her siblings continue to express desires for comfort focused cares.  They understand that care management working on discharge disposition and that patient will remain at [facility]  "until placement is found.  Expresses much appreciation for all the care that her mother has received.  She is relieved that patient is peaceful and comfortable.         Interval History:     Chart review/discussion with unit or clinical team members:   Calm.  Resting peacefully.  No distress.         Review of Systems:      Patient answered \"no\" to pain.  Unable to answer other questions.  Is painful with turning per staff and daughter report.  Constipation: bm 11/14/2023               Physical Exam:      138 lbs 7.18 oz    General appearance: alert, cooperative, fatigued, and no distress  Head: Normocephalic, without obvious abnormality, atraumatic  Eyes: lids and lashes normal  Nose: no discharge  Lungs: clear to auscultation bilaterally  Abdomen: +BS x4, soft  Extremities: warm, no cyanosis  Neurologic:            Current Problem List:   Principal Problem:    Closed fracture of multiple ribs of both sides, initial encounter  Active Problems:    Lives in assisted living facility    Falls frequently    Displaced intertrochanteric fracture of left femur, initial encounter for closed fracture (H)    Senile dementia, with behavioral disturbance (H)    Osteoporotic compression fracture of spine, with routine healing, subsequent encounter    DNR (do not resuscitate)    History of recent blood transfusion    Acute blood loss as cause of postoperative anemia    Hospice care      Allergies   Allergen Reactions    Penicillins Anaphylaxis, Rash and Shortness Of Breath    Aspirin Nausea and GI Disturbance    Atenolol Cough    Atorvastatin Muscle Pain (Myalgia) and Nausea and Vomiting    Bupropion Nausea    Cephalexin Rash     Localized to neck area    Clindamycin Other (See Comments)     Constipation     Codeine Nausea    Ibuprofen Nausea and GI Disturbance    Lovastatin Muscle Pain (Myalgia)       30 MINUTES SPENT BY ME on the date of service doing chart review, history, exam, documentation & further activities per the note. "

## 2023-11-14 NOTE — PROGRESS NOTES
[facility]    Medicine Progress Note - Hospitalist Service  Date of Admission:  10/26/2023  83-year-old with history of chronic dementia, osteoporosis with previous compression fractures of T12, L3, L4 and L5, memory care resident admitted for recurrent falls and pain of left hip.  Diagnosed with left hip fracture intertrochanteric proximal fracture.       [place] [facility] performed left internal fixation on 10/27/2023.  Developed postoperative anemia which required transfusion and has been stable.  After meeting with palliative care provider, patient has elected for comfort care on 11/2/23.  Care management consulted for Hospice at care facility, and likely here over weekend. And ortho removed sutures 11/10.     Pending placement at facility w/Hospice. Concern for swallowing given intermittent somnolence, a conditional prn for levothyroxine is available (30% convert po to iv).     Discussed w/ SW again    Date of Admission:  10/26/2023  Update November 14, 2023  The patient continues to await hospice care she is very demented but not agitated this morning daughter has very reasonable expectations the plan is to get her in some safe place where they do hospice she apparently had been briefly on hospice before but they felt she did not meet criteria  She is barely responsive now but not actively dying awaits placement      Assessment & Plan   Principal Problem:    Closed fracture of multiple ribs of both sides, initial encounter (10/26/2023)  Active Problems:    DNR (do not resuscitate) (11/5/2023)    Hospice care (11/5/2023)    Lives in assisted living facility (5/16/2020)    Falls frequently (10/26/2023)    Displaced intertrochanteric fracture of left femur, initial encounter for closed fracture (H) (10/26/2023)    Senile dementia, with behavioral disturbance (H) (11/5/2023)    Osteoporotic compression fracture of spine, with routine healing, subsequent encounter (11/5/2023)    History of  recent blood transfusion (11/5/2023)    Acute blood loss as cause of postoperative anemia (11/5/2023)             Diet: Advance Diet as Tolerated: Regular Diet Adult    DVT Prophylaxis:   Diaz Catheter: Not present  Lines: None     Cardiac Monitoring: None  Code Status: No CPR- Do NOT Intubate      Clinically Significant Risk Factors                  # Hypertension: Noted on problem list     # Dementia: noted on problem list               Disposition Plan     Expected Discharge Date: 11/14/2023    Discharge Delays: Placement - LTC  *Medically Ready for Discharge  Destination: assisted living  Discharge Comments: pending hospice placement            Sea Metz MD  Hospitalist Service  [facility]  Securely message with [employer] (more info)  Text page via [employer] Paging/Directory   ______________________________________________________________________    Interval History   See above    Physical Exam   Vital Signs:                    Weight: 138 lbs 7.18 oz  She is comfortable sedated    Medical Decision Making       25 MINUTES SPENT BY ME on the date of service doing chart review, history, exam, documentation & further activities per the note.        Data

## 2023-11-15 NOTE — PLAN OF CARE
Problem: Adult Inpatient Plan of Care  Goal: Optimal Comfort and Wellbeing  Intervention: Monitor Pain and Promote Comfort  Recent Flowsheet Documentation  Taken 11/14/2023 2205 by Janell Cooper, RN  Pain Management Interventions:   declines   care clustered   emotional support   rest   repositioned   quiet environment facilitated     Problem: Risk for Delirium  Goal: Improved Attention and Thought Clarity  Intervention: Maximize Cognitive Function  Recent Flowsheet Documentation  Taken 11/15/2023 0115 by Janell Cooper, RN  Sensory Stimulation Regulation:   care clustered   lighting decreased   music on   quiet environment promoted   tactile stimulation minimized   visual stimulation minimized   auditory stimulation minimized  Taken 11/14/2023 2145 by Janell Cooper, RN  Sensory Stimulation Regulation:   care clustered   lighting decreased   music on   quiet environment promoted   tactile stimulation minimized   visual stimulation minimized   auditory stimulation minimized    Oriented only to self, resting comfortably throughout shift, minimally arousable. VSS on RA, repositioned every 2 hours. Did not void overnight, small smear. Nighttime medications held due to patient's arousability level, see MAR for details. Mepilex in place on sacrum and heels for skin integrity.

## 2023-11-15 NOTE — PROGRESS NOTES
Care Management Follow Up    Length of Stay (days): 20    Expected Discharge Date: 11/16/2023     Concerns to be Addressed: discharge planning         Additional Information:  Pt has been accepted to [facility] LTC ($7,000 down payment); pt is anticipated to be stable for transport and will discharge on [facility]. WILLIS left message for daughter (Eladio [phone]) with said information and waiting for call to be returned. SW following.  8:51 AM    WILLIS left message for Jose Carlos at [facility] again inquiring about bed availability.  8:54 AM    [facility] is not able to meet pt's needs; declined. WILLIS spoke with daughter who stated that she is unsure about [facility] and would like additional referrals sent; send and pending. SW following.  10:18 AM    NIKKI Hunt  11/15/2023

## 2023-11-15 NOTE — PROGRESS NOTES
PALLIATIVE CARE PROGRESS NOTE  [facility]     Patient Name: Mehreen Camara  Date of Admission: 10/26/2023   Today the patient was seen for: symptom management, patient and family support and goals of care     Recommendations & Counseling     GOALS OF CARE:   Continue comfort cares and medication adjustment for symptom management.  Care management assisting with discharge disposition.  Reevaluating for [facility] versus LTC with Hospice.     ADVANCE CARE PLANNING:  Patient has an advance directive dated 11/29/2015.  Primary Health Care Agent daughter, Eladio Dowd.  Alternate(s) daughter, Irasema Rajan.   There is a POLST form on file, this was reviewed and current. DNR/I and comfort focused treatments.  Code status: No CPR- Do NOT Intubate     MEDICAL MANAGEMENT:    #Pain -thoracic and lumbar spine and right hip historically.   Continue Oxycodone concentrate continue 5 mg q4h.   Continue PRN Oxycodone concentrate SL 5-10 to q 1 hour as needed.    No prn used in >36 hours.  Continue lidocaine patch to 2 patches per day.   Tylenol 650 mg po q4h prn. None given     #Air hunger/Dyspnea, does not appear in any respiratory distress.    Continue Oxycodone concentrate continue 5 mg q 4 hours    Oxycodone concentrate SL 5-10 q 1 hour as needed - 20 mg given in last 24 hours     #Secretion burden   Robinul 0.2 mg  q 4 hours as needed. If ineffective, increase up to 0.3 mg   Consider atropine if Robinul ineffective after dose increase - try to avoid anticholinergics as can increase delirium.     #Nausea   Zofran 4 mg q 6 hours as needed. Can increase to 8 mg   Zyprexa 5 mg q 8 hours as needed      #Agitation, in the setting of Lewy Body dementia - peaceful and calm; controlled  FIRST: Zyprexa 5 mg ODT BID and q8h prn. Will avoid IM. Could consider increase dose of Zyprexa to 10 mg.   Should patient have difficulty taking Zyprexa ODT, use Haldol concentrate 2 mg SL every 4 hours as  needed. None given.  Seroquel 12.5  mg po TID PRN - none given      #Bowel regimen - while on scheduled opiates BM x2 11/14/2023  Senna- pericolace 1 tab daily  Bisacodyl 10 mg suppository daily prn.      PSYCHOSOCIAL/SPIRITUAL:  Family  - tina Hendricks main contact   - 10 years. 5 children. Daughter, Eladio most involved.  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 Thank you for the consult and allowing us to aid in the care of Mehreen Camara.    These recommendations have been discussed with Sienna Lacy RN.    HAILEE Avila, CNS, AOCNS  Securely message with [employer]more info)  Text page via [facility] Paging/Directory         Assessments           Mehreen Camara is a 83 year old female with a past medical history of multi-system atrophy, Lewy Body dementia, aortic stenosis, history of falls who presented on 10/26/23 with left hip pain and was found to have an acute left intertrochanteric proximal femur fracture.  She also has old compression fractures of T12, L3, L4, L5.  She underwent a right hip trochanteric nailing on 10/27. Has been followed by outpatient palliative clinic, with last visit in 6/2023.  Patient continues on comfort cares while care management team investigates discharge disposition with hospice services.  Continuing with comfort focused treatments and medications for symptom management at this time.           Interval History:     Chart review/discussion with unit or clinical team members:   Peaceful. Comfortable. no distress. Calm.    Per patient or family/caregivers today:  Updated daughter, Eladio, via telephone that patient appears peaceful and comfortable and in no distress.  No medication adjustment needed at this time.  We will continue to monitor and titrate meds for comfort.  Eladio appreciates all the excellent care and respect that her mother has been receiving here at [facility].  " Hopeful to find a facility to discharge to with hospice or hospice facility like [facility].           Review of Systems:     Limited due to patient's Lewy body dementia and ability to answer questions.  Answered \"no\" to pain and shortness of breath.  Weakness/fatigue: Severe, bedbound  Constipation: None, BM 11/14/2023             Physical Exam:      138 lbs 7.18 oz    General appearance: alert, fatigued, and no distress  Head: Normocephalic, without obvious abnormality, atraumatic  Eyes: sclera anicteric, pupils round and equal  Nose: no discharge  Throat: lips without lesions. Oral mucosa moist  Lungs: clear to auscultation bilaterally  Heart: murmur noted  Extremities: warm, no cyanosis or edema noted.  Neurologic: opened eyes to verbal stimulus and made eye contact.  Minimally conversant.  Calm.           Current Problem List:   Principal Problem:    Closed fracture of multiple ribs of both sides, initial encounter  Active Problems:    Lives in assisted living facility    Falls frequently    Displaced intertrochanteric fracture of left femur, initial encounter for closed fracture (H)    Senile dementia, with behavioral disturbance (H)    Osteoporotic compression fracture of spine, with routine healing, subsequent encounter    DNR (do not resuscitate)    History of recent blood transfusion    Acute blood loss as cause of postoperative anemia    Hospice care      Allergies   Allergen Reactions    Penicillins Anaphylaxis, Rash and Shortness Of Breath    Aspirin Nausea and GI Disturbance    Atenolol Cough    Atorvastatin Muscle Pain (Myalgia) and Nausea and Vomiting    Bupropion Nausea    Cephalexin Rash     Localized to neck area    Clindamycin Other (See Comments)     Constipation     Codeine Nausea    Ibuprofen Nausea and GI Disturbance    Lovastatin Muscle Pain (Myalgia)       30 MINUTES SPENT BY ME on the date of service doing chart review, history, exam, documentation & further activities per the " note.

## 2023-11-15 NOTE — PROGRESS NOTES
[facility]    Medicine Progress Note - Hospitalist Service    Date of Admission:  10/26/2023  83-year-old with history of chronic dementia, osteoporosis with previous compression fractures of T12, L3, L4 and L5, memory care resident admitted for recurrent falls and pain of left hip.  Diagnosed with left hip fracture intertrochanteric proximal fracture.      [place] [facility] performed left internal fixation on 10/27/2023.  Suture removal on 11/10. Pt developed postoperative anemia which required transfusion and has been stable.  After meeting with palliative care provider, patient has elected for comfort care on 11/2/23.  Care management consulted for Hospice at care facility    Pending placement at facility w/Hospice.      Assessment & Plan   Palliative care enounter  Left hip fracture  Status post left internal fixation on 10/27.  Appreciate orthopedic recommendations.  Toe-touch weightbearing with walker  Activity as tolerated.   Fall precautions.  Pain management per palliative care.     Essential hypertension  -Stopped antihypertensives 11/7, patient not elevated 11/8. Family discussed on 11/8 and okay with not checking BP or starting antihypertensives. Explained previously that BP is likely to continue dropping with decreased PO intake as well as our meds for comfort.  -stable bp     Osteoporosis  Multiple compression fractures of lumbar and thoracic spine.  Continue supportive cares, and pain management.     Aortic stenosis  Moderate aortic stenosis on echocardiogram.  Regular follow-up with cardiology for monitoring.     Lewy body dementia/MSA  PTA medication: Sinemet.   Gabapentin 100 mg TID  Olanzapine as needed.   Haldol as needed.     Hypothyroidism  A/p- on synthroid 100mcg  - some mornings there are concerns for tolerating po intake-- conditional prn for levothyroxine is available (30% convert po to iv), but so far has not needed           Diet: Advance Diet as Tolerated:  Regular Diet Adult    DVT Prophylaxis: Comfort care  Diaz Catheter: Not present  Lines: None     Cardiac Monitoring: None  Code Status: No CPR- Do NOT Intubate      Clinically Significant Risk Factors                  # Hypertension: Noted on problem list     # Dementia: noted on problem list               Disposition Plan      Expected Discharge Date: 11/16/2023    Discharge Delays: Placement - LTC  *Medically Ready for Discharge  Destination: assisted living  Discharge Comments: pending hospice placement            Tito Champion MD  Hospitalist Service  [facility]  Securely message with [employer] (more info)  Text page via [employer] Paging/Directory   ______________________________________________________________________    Interval History   Patient was resting in bed.  Appears comfortable.  Does not answer questions, open eyes to voice.    Physical Exam   Vital Signs:                    Weight: 138 lbs 7.18 oz    Patient appears comfortable.  Only opens eyes to voice.  Does not follow commands or answer question    Medical Decision Making           Data

## 2023-11-15 NOTE — PLAN OF CARE
Problem: Risk for Delirium  Goal: Optimal Coping  Outcome: Progressing  Goal: Improved Behavioral Control  Outcome: Progressing  Intervention: Minimize Safety Risk  Recent Flowsheet Documentation  Taken 11/15/2023 1010 by Sienna Lacy RN  Enhanced Safety Measures: room near unit station     Problem: Palliative Care  Goal: Enhanced Quality of Life  Outcome: Progressing     Problem: Fall Injury Risk  Goal: Absence of Fall and Fall-Related Injury  Outcome: Met   Goal Outcome Evaluation:

## 2023-11-15 NOTE — PLAN OF CARE
Problem: Palliative Care  Goal: Enhanced Quality of Life  Outcome: Progressing  Intervention: Maximize Comfort  Recent Flowsheet Documentation  Taken 11/15/2023 1733 by Yanique Gordon RN  Oral Care:   lip/mouth moisturizer applied   oral rinse provided  Taken 11/15/2023 1719 by Yanique Gordon RN  Pain Management Interventions: medication (see MAR)  Intervention: Optimize Psychosocial Wellbeing  Recent Flowsheet Documentation  Taken 11/15/2023 1719 by Yanique Gordon RN  Supportive Measures:   active listening utilized   problem-solving facilitated   relaxation techniques promoted   verbalization of feelings encouraged     Problem: Pain Acute  Goal: Optimal Pain Control and Function  Outcome: Progressing  Intervention: Develop Pain Management Plan  Recent Flowsheet Documentation  Taken 11/15/2023 1719 by Yanique Gordon RN  Pain Management Interventions: medication (see MAR)  Intervention: Prevent or Manage Pain  Recent Flowsheet Documentation  Taken 11/15/2023 1733 by Yanique Gordon RN  Medication Review/Management: medications reviewed  Taken 11/15/2023 1719 by Yanique Gordon RN  Bowel Elimination Promotion: adequate fluid intake promoted  Intervention: Optimize Psychosocial Wellbeing  Recent Flowsheet Documentation  Taken 11/15/2023 1719 by Yanique Gordon RN  Supportive Measures:   active listening utilized   problem-solving facilitated   relaxation techniques promoted   verbalization of feelings encouraged     Patient is alert to self and place only, frequent reorientation provided throughout the shift. Patient was alert and conversing tonight and able to make her needs known. Patient had both bladder and bowel incontinence this afternoon, linens changed, new brief and gown changed. Emily-care provided and purewik in place to reduce the risk for skin breakdown. Patient has non-blanchable wound on her sacrum/coccyx, new Mepilex applied and Q2 turns provided. Pillows in place and heels elevated. Patient has  not been out of bed during this shift. Patient's pain managed with cold therapy, pain medications, rest, and repositioning.

## 2023-11-16 NOTE — PROGRESS NOTES
[facility]    Medicine Progress Note - Hospitalist Service    Date of Admission:  10/26/2023  83-year-old with history of chronic dementia, osteoporosis with previous compression fractures of T12, L3, L4 and L5, memory care resident admitted for recurrent falls and pain of left hip.  Diagnosed with left hip fracture intertrochanteric proximal fracture.      [place] [facility] performed left internal fixation on 10/27/2023.  Suture removal on 11/10. Pt developed postoperative anemia which required transfusion and has been stable.  After meeting with palliative care provider, patient has elected for comfort care on 11/2/23.  Care management consulted for Hospice at care facility    Pending placement at facility w/Hospice.  However, patient appears to be actively dying.  She is no longer responding or making urine.  Family does not want patient to be transferred at this point.    Assessment & Plan   Palliative care enounter  Left hip fracture  Status post left internal fixation on 10/27.  Appreciate orthopedic recommendations.  Toe-touch weightbearing with walker  Activity as tolerated.   Fall precautions.  Pain management per palliative care.     Essential hypertension  -Stopped antihypertensives 11/7, patient not elevated 11/8. Family discussed on 11/8 and okay with not checking BP or starting antihypertensives. Explained previously that BP is likely to continue dropping with decreased PO intake as well as our meds for comfort.  -stable bp     Osteoporosis  Multiple compression fractures of lumbar and thoracic spine.  Continue supportive cares, and pain management.     Aortic stenosis  Moderate aortic stenosis on echocardiogram.  No on comfort care     Lewy body dementia/MSA  PTA medication: Sinemet.   Gabapentin 100 mg TID  Olanzapine as needed.   Haldol as needed.     Hypothyroidism  A/p- on synthroid 100mcg  - some mornings there are concerns for tolerating po intake-- conditional prn for  levothyroxine is available (30% convert po to iv), but so far has not needed           Diet: Advance Diet as Tolerated: Regular Diet Adult    DVT Prophylaxis: Comfort care  Diaz Catheter: Not present  Lines: None     Cardiac Monitoring: None  Code Status: No CPR- Do NOT Intubate      Clinically Significant Risk Factors                  # Hypertension: Noted on problem list     # Dementia: noted on problem list               Disposition Plan     Expected Discharge Date: 11/16/2023    Discharge Delays: Placement - LTC  *Medically Ready for Discharge  Destination: assisted living  Discharge Comments: pending hospice placement            Tito Champion MD  Hospitalist Service  [facility]  Securely message with [employer] (more info)  Text page via [employer] Paging/Directory   ______________________________________________________________________    Interval History   Patient appears comfortable resting in bed eyes closed.  Nonverbal not responding.  Daughter was visiting.  According to daughter patient now has less urine output.  Also patient has not requiring narcotic since yesterday.    Physical Exam   Vital Signs: Temp: 98.5  F (36.9  C) Temp src: Axillary BP: 139/79 Pulse: 72   Resp: 12 SpO2: 90 % O2 Device: None (Room air)    Weight: 138 lbs 7.18 oz    In bed appear comfortable     Medical Decision Making           Data

## 2023-11-16 NOTE — PROGRESS NOTES
Care Management Follow Up    Length of Stay (days): 21    Expected Discharge Date: 11/17/2023     Concerns to be Addressed: discharge planning     Patient plan of care discussed at interdisciplinary rounds: Yes      Additional Information:  Pt accepted at [facility], [facility] [place], and [facility].    SW discussed Pt's care with MD.  Per MD, anticipate pt will remain hospitalized for end of life.  WILLIS updated facilities where Pt had been accepted.     WILLIS updated [place] with [facility] (ph: [phone]).      NAM Love

## 2023-11-16 NOTE — PLAN OF CARE
"Goal Outcome Evaluation:       Patient lethargic this AM, unable to take po intake or medications due to lethargy.  VSS: /79 (BP Location: Right arm, Patient Position: Semi-Sibley's, Cuff Size: Adult Regular)   Pulse 72   Temp 98.5  F (36.9  C) (Axillary)   Resp 12   Ht 1.676 m (5' 6\")   Wt 62.8 kg (138 lb 7.2 oz)   SpO2 90%   BMI 22.35 kg/m   on room air.  VS done per family request.  Around 1200 patient became alert, only oriented to self at times, able to answer simple questions.  Patient given medications crushed in apple sauce, tolerated.  Patient reported moderate pain in back, given prn liquid oxycodone with relief.  Patient resting between cares, turned q2h.  Daughter at bedside.  Patient's coccyx has erythema, sacral mepilex in place.  Heels have blanchable erythema, floated.  Female external catheter in place.  Patient offered fluids and po intake on rounds, mouth swabbed prn.  Discharge pending, SW following.                    "

## 2023-11-16 NOTE — PROGRESS NOTES
PALLIATIVE CARE PROGRESS NOTE  [facility]     Patient Name: Mehreen Camara  Date of Admission: 10/26/2023   Today the patient was seen for: symptom management and family support     Recommendations & Counseling     GOALS OF CARE:   Continue comfort cares and medication adjustment for symptom management.  Care management assisting with discharge disposition with Hospice.     ADVANCE CARE PLANNING:  Patient has an advance directive dated 11/29/2015.  Primary Health Care Agent daughter, Eladio Dowd.  Alternate(s) daughter, Irasema Rajan.   There is a POLST form on file, this was reviewed and current. DNR/I and comfort focused treatments.  Code status: No CPR- Do NOT Intubate     MEDICAL MANAGEMENT:    #Pain -thoracic and lumbar spine and right hip historically.   Oxycodone concentrate continue 5 mg q4h. SEVERAL doses held over last 48 hours. CHANGE FREQUENCY TO Q6H.  Continue PRN Oxycodone concentrate SL 5-10 to q 1 hour as needed.    No prn used.  Continue lidocaine patch to 2 patches per day.   Tylenol 650 mg po q4h prn. None given     #Air hunger/Dyspnea, does not appear in any respiratory distress.    Continue Oxycodone concentrate continue 5 mg q 4 hours-   Oxycodone concentrate SL 5-10 q 1 hour as needed - 20 mg given in last 24 hours     #Secretion burden   Robinul 0.2 mg  q 4 hours as needed. If ineffective, increase up to 0.3 mg   Consider atropine if Robinul ineffective after dose increase - try to avoid anticholinergics as can increase delirium.     #Nausea   Zofran 4 mg q 6 hours as needed. Can increase to 8 mg   Zyprexa 5 mg q 8 hours as needed      #Agitation, in the setting of Lewy Body dementia - peaceful and calm; controlled  FIRST: Zyprexa 5 mg ODT BID and q8h prn. Will avoid IM. Could consider increase dose of Zyprexa to 10 mg.   Should patient have difficulty taking Zyprexa ODT, use Haldol concentrate 2 mg SL every 4 hours as needed. None given.  Seroquel 12.5  mg  po TID PRN - none given      #Bowel regimen - while on scheduled opiates BM x2 11/14/2023  Senna- pericolace 1 tab daily  Bisacodyl 10 mg suppository daily prn.      PSYCHOSOCIAL/SPIRITUAL:  Family  - Eladio, daughter main contact   - 10 years. 5 children. Daughter, Eladio most involved.  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 Thank you for the consult and allowing us to aid in the care of Mehreen Camara.    LUMA Booker, APRN, CNS, AOCNS  Securely message with [employer] (more info)  Text page via [facility] Paging/Directory         Assessments           Mehreen Camara is a 83 year old female with a past medical history of multi-system atrophy, Lewy Body dementia, aortic stenosis, history of falls who presented on 10/26/23 with left hip pain and was found to have an acute left intertrochanteric proximal femur fracture.  She also has old compression fractures of T12, L3, L4, L5.  She underwent a right hip trochanteric nailing on 10/27. Has been followed by outpatient palliative clinic, with last visit in 6/2023.  Patient continues on comfort cares while care management team investigates discharge disposition with hospice services.  Continuing with comfort focused treatments and medications for symptom management at this time.         Interval History:     Chart review/discussion with unit or clinical team members:   Dark simone urine.  Oriented to self only.  No acute distress.  Appears peaceful and comfortable.  Sleeping most of the day.         Review of Systems:   Patient unable to answer questions today.  Opened eyes briefly to name then drifted back to sleep.  ROS was reviewed.  Weakness/fatigue: severe. bedbound  Constipation: BM 11/15/2023           Physical Exam:      138 lbs 7.18 oz    General appearance: fatigued and no distress  Head: Normocephalic, without obvious abnormality, atraumatic  Eyes: lids and lashes  normal  Nose: no discharge  Lungs: clear to auscultation bilaterally  Abdomen: soft, non-distended  Extremities: no edema noted  Neurologic: Opened eyes briefly, oriented to self only           Current Problem List:   Principal Problem:    Closed fracture of multiple ribs of both sides, initial encounter  Active Problems:    Lives in assisted living facility    Falls frequently    Displaced intertrochanteric fracture of left femur, initial encounter for closed fracture (H)    Senile dementia, with behavioral disturbance (H)    Osteoporotic compression fracture of spine, with routine healing, subsequent encounter    DNR (do not resuscitate)    History of recent blood transfusion    Acute blood loss as cause of postoperative anemia    Hospice care      Allergies   Allergen Reactions    Penicillins Anaphylaxis, Rash and Shortness Of Breath    Aspirin Nausea and GI Disturbance    Atenolol Cough    Atorvastatin Muscle Pain (Myalgia) and Nausea and Vomiting    Bupropion Nausea    Cephalexin Rash     Localized to neck area    Clindamycin Other (See Comments)     Constipation     Codeine Nausea    Ibuprofen Nausea and GI Disturbance    Lovastatin Muscle Pain (Myalgia)     30 MINUTES SPENT BY ME on the date of service doing chart review, history, exam, documentation & further activities per the note.

## 2023-11-16 NOTE — PLAN OF CARE
Problem: Risk for Delirium  Goal: Improved Attention and Thought Clarity  Intervention: Maximize Cognitive Function  Recent Flowsheet Documentation  Taken 11/15/2023 2340 by Janell Cooper, RN  Reorientation Measures:   clock in view   glasses use encouraged   reorientation provided     Problem: Pain Acute  Goal: Optimal Pain Control and Function  Intervention: Prevent or Manage Pain  Recent Flowsheet Documentation  Taken 11/15/2023 2340 by Janell Cooper, RN  Medication Review/Management: medications reviewed     Oriented only to self, arouses to voice. Resting comfortably in bed throughout shift. Purewick in place, urine is cola-colored. Oral cares provided. Non-blanchable redness noted on sacral area, tolerated Q2 repositioning, no new skin concerns.

## 2023-11-16 NOTE — PLAN OF CARE
"  Problem: Adult Inpatient Plan of Care  Goal: Patient-Specific Goal (Individualized)  Description: You can add care plan individualizations to a care plan. Examples of Individualization might be:  \"Parent requests to be called daily at 9am for status\", \"I have a hard time hearing out of my right ear\", or \"Do not touch me to wake me up as it startles  me\".  Outcome: Progressing  Goal: Absence of Hospital-Acquired Illness or Injury  Outcome: Progressing  Intervention: Identify and Manage Fall Risk  Recent Flowsheet Documentation  Taken 11/16/2023 1535 by Yanique Gordon RN  Safety Promotion/Fall Prevention:   activity supervised   assistive device/personal items within reach   clutter free environment maintained   increased rounding and observation   increase visualization of patient   lighting adjusted   patient and family education   room door open   room organization consistent   room near nurse's station   safety round/check completed  Intervention: Prevent Skin Injury  Recent Flowsheet Documentation  Taken 11/16/2023 1535 by Yanique Gordon RN  Skin Protection: skin to skin areas padded  Device Skin Pressure Protection: pressure points protected  Intervention: Prevent and Manage VTE (Venous Thromboembolism) Risk  Recent Flowsheet Documentation  Taken 11/16/2023 1535 by Yanique Gordon RN  VTE Prevention/Management: SCDs (sequential compression devices) on  Goal: Optimal Comfort and Wellbeing  Outcome: Progressing  Intervention: Monitor Pain and Promote Comfort  Recent Flowsheet Documentation  Taken 11/16/2023 1535 by Yanique Gordon RN  Pain Management Interventions:   medication (see MAR)   breathing exercises   care clustered   cold applied   distraction   emotional support   pain management plan reviewed with patient/caregiver   pillow support provided   repositioned   rest  Goal: Readiness for Transition of Care  Outcome: Progressing       Patient is alert to self only but is able to make her needs known. " Patient is tolerating a regular diet and voices her preferences to the nursing staff. Patient has not been out of bed today due to weakness. Q2 turns provided to reduce the risk for skin breakdown. Linens changed, new brief applied and christian-care provided. Pillows in placed under bony prominences and under patient's calfs to prevent skin breakdown. Voiding adequately using a purewik due to urinary incontinence. Mepilex on patient sacrum/buttock, CDI, full sensation per Pt. Patient's pain controlled with cold therapy, pain medications, rest, and repositioning.

## 2023-11-17 NOTE — PROGRESS NOTES
Care Management Follow Up    Length of Stay (days): 22    Expected Discharge Date: 11/17/2023     Concerns to be Addressed: LTC facility   Patient plan of care discussed at interdisciplinary rounds: Yes    Anticipated Discharge Disposition: SNF     Anticipated Discharge Services: [facility]   Anticipated Discharge DME: None    Patient/family educated on Medicare website which has current facility and service quality ratings: yes  Education Provided on the Discharge Plan: Yes  Patient/Family in Agreement with the Plan: yes    Referrals Placed by CM/SW:  post acute care facilities     Additional Information:    12:28 PM  WILLIS discussed Pt's care with MD.  Pt is stable for discharge to post acute care.  WILLIS spoke with BOBBY Mix at [facility] (ph: [phone]).  Facility does have a bed available.  Private pay--down payment of 5 day minimum $2830 (this includes $230 RN assessment) and $520/day after 5 days.  WILLIS discussed this option with Pt's daughter, Eladio.  Eladio is discussing this option with family and will get back to WILLIS.     1:39 PM  WILLIS received call back from Eladio who reports family is unable to pay privately for [facility].  Eladio requests referrals to LTC facilities in [place].  WILLIS faxed referrals.      3:26 PM  Pt declined at [facility] due to bed availability.  WILLIS called Pt's daughter, Eladio with updates.  Eladio requests TCU lists sent to her email for further review for more options.  [facility]IS emailed [facility] to Eladio with list of facilities that have declined as well.      NAM Love

## 2023-11-17 NOTE — PROGRESS NOTES
[facility] Medicine PROGRESS NOTE      Identification/Summary:   83-year-old with history of chronic dementia, osteoporosis with previous compression fractures of T12, L3, L4 and L5, memory care resident admitted for recurrent falls and pain of left hip.  Diagnosed with left hip fracture intertrochanteric proximal fracture.       [facility] performed left internal fixation on 10/27/2023.  Suture removal on 11/10. Pt developed postoperative anemia which required transfusion and has been stable.  After meeting with palliative care provider, patient has elected for comfort care on 11/2/23.  Care management consulted for Hospice at [facility]    Hospital day #22    She had decreased responsiveness on 11/16, but today appears to be more alert and was able to have a conversation with family.  She is oriented to person.  She was seen with palliative care in the room.  No obvious distress noted.  Care management notified and working on discharge.  Discussed with daughter Eladio over the phone.        Assessment & Plan  Palliative care sandra  Left hip fracture  Status post left internal fixation on 10/27.  Appreciate orthopedic recommendations.  Toe-touch weightbearing with walker  Activity as tolerated.   Fall precautions.  Pain management per palliative care.  Palliative care signed off.  Pain is controlled     Essential hypertension  -Stopped antihypertensives 11/7, patient not elevated 11/8. Family discussed on 11/8 and okay with not checking BP or starting antihypertensives. Explained previously that BP is likely to continue dropping with decreased PO intake as well as our meds for comfort.  -stable bp     Osteoporosis  Multiple compression fractures of lumbar and thoracic spine.  Continue supportive cares, and pain management.     Aortic stenosis  Moderate aortic stenosis on echocardiogram.  No on comfort care     Lewy body dementia/MSA  PTA medication: Sinemet.   Gabapentin 100 mg TID  Olanzapine  "as needed.   Haldol as needed.     Hypothyroidism  A/p- on synthroid 100mcg  - some mornings there are concerns for tolerating po intake-- conditional prn for levothyroxine is available (30% convert po to iv), but so far has not needed       Diet: Advance Diet as Tolerated: Regular Diet Adult  DVT Prophylaxis:   On comfort care  Code Status: No CPR- Do NOT Intubate    Clinically Significant Risk Factors                  # Hypertension: Noted on problem list     # Dementia: noted on problem list             Anticipated possible discharge in few days once placement with hospice milestones are met.    Interval History/Subjective:  She is laying in bed, appears comfortable, her granddaughter, palliative care in room. She is oriented to person, and denies any pain.    Physical Exam/Objective:  Vitals I/O   Vital signs:                  Oxygen Delivery: 2 LPM Height: 167.6 cm (5' 6\") Weight: 62.8 kg (138 lb 7.2 oz)  Estimated body mass index is 22.35 kg/m  as calculated from the following:    Height as of this encounter: 1.676 m (5' 6\").    Weight as of this encounter: 62.8 kg (138 lb 7.2 oz).   I/O last 3 completed shifts:  In: 220 [P.O.:220]  Out: 300 [Urine:300]     Body mass index is 22.35 kg/m .    General Appearance:  Alert, cooperative, no distress   Head:  Normocephalic, atraumatic   Eyes:  PERRL    Throat:  mucosa; moist   Neck: No JVD, thyromegaly   Lungs:   Clear to auscultation bilaterally, respirations unlabored   Chest Wall:  No tenderness or deformity   Heart:  Regular rate and rhythm, S1, S2 normal,no murmur   Abdomen:   Soft, non tender, non distended, bowel sounds present, no guarding or rigidity   Extremities: No edema, no joint swelling, warm extremities   Skin: Skin color, texture, turgor normal, no rashes or lesions   Neurologic: Alert and oriented X 1, Moves all 4 extremities       Medications:   Personally Reviewed.   carbidopa-levodopa  1 half-tab Oral TID    DULoxetine  60 mg Oral Daily    " gabapentin  100 mg Oral TID    levothyroxine  100 mcg Oral Daily    Lidocaine  2 patch Transdermal Q24H    OLANZapine zydis  5 mg Oral BID    oxyCODONE  5 mg Oral Q6H    senna-docusate  1 tablet Oral BID       Data reviewed today: I personally reviewed all new medications, labs, imaging/diagnostics reports over the past 24 hours. Pertinent findings include         > 35 MINUTES SPENT BY ME on the date of service doing chart review, history, exam, documentation & further activities per the note.    Joy Montero MD  Hospitalist  [facility]

## 2023-11-17 NOTE — PROGRESS NOTES
PALLIATIVE CARE PROGRESS NOTE  [facility]     Patient Name: Mehreen Camara  Date of Admission: 10/26/2023   Today the patient was seen for: symptom management and family support     Recommendations & Counseling     GOALS OF CARE:   Continue comfort cares and medication adjustment for symptom management.  Care management assisting with discharge disposition with Hospice.  Patient appears physiologically stable to discharge today, symptoms are well managed.   Discussed with Dr. Montero and Kenzie PEDRO Care Manager  Palliative care will sign off at this time as symptoms are well managed and goals of care are established.      ADVANCE CARE PLANNING:  Patient has an advance directive dated 11/29/2015.  Primary Health Care Agent daughter, Eladio Dowd.  Alternate(s) daughter, Irasema Rajan.   There is a POLST form on file, this was reviewed and current. DNR/I and comfort focused treatments.  Code status: No CPR- Do NOT Intubate     MEDICAL MANAGEMENT:    #Pain -thoracic and lumbar spine and right hip historically.  Today reports pain in buttock after sitting up in bed.   Oxycodone concentrate continue 5 mg q4h.   Continue PRN Oxycodone concentrate SL 5-10 to q 1 hour as needed.    No prn used.  Continue lidocaine patch to 2 patches per day.   Tylenol 650 mg po q4h prn. None given     #Air hunger/Dyspnea, does not appear in any respiratory distress.    Continue Oxycodone concentrate continue 5 mg q 4 hours-   Oxycodone concentrate SL 5-10 q 1 hour as needed - 20 mg given in last 24 hours     #Secretion burden   Robinul 0.2 mg  q 4 hours as needed. If ineffective, increase up to 0.3 mg   Consider atropine if Robinul ineffective after dose increase - try to avoid anticholinergics as can increase delirium.     #Nausea   Zofran 4 mg q 6 hours as needed. Can increase to 8 mg   Zyprexa 5 mg q 8 hours as needed      #Agitation, in the setting of Lewy Body dementia - peaceful and calm;  controlled  FIRST: Zyprexa 5 mg ODT BID and q8h prn. Will avoid IM. Could consider increase dose of Zyprexa to 10 mg.   Should patient have difficulty taking Zyprexa ODT, use Haldol concentrate 2 mg SL every 4 hours as needed. None given.  Seroquel 12.5  mg po TID PRN - none given      #Bowel regimen - while on scheduled opiates BM 11/17/23  Senna- pericolace 1 tab daily  Bisacodyl 10 mg suppository daily prn.      PSYCHOSOCIAL/SPIRITUAL:  Family  - Eladio, daughter main contact   - 10 years. 5 children. Daughter, Eladio most involved.  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    Alysa Bar, CNS  [facility], Palliative Care  Securely message with the [employer] (learn more here) or  Text page via [facility] Paging/Directory           Assessments           Mehreen Camara is a 83 year old female with a past medical history of multi-system atrophy, Lewy Body dementia, aortic stenosis, history of falls who presented on 10/26/23 with left hip pain and was found to have an acute left intertrochanteric proximal femur fracture.  She also has old compression fractures of T12, L3, L4, L5.  She underwent a right hip trochanteric nailing on 10/27. Has been followed by outpatient palliative clinic, with last visit in 6/2023.  Patient continues on comfort cares while care management team investigates discharge disposition with hospice services.  Continuing with comfort focused treatments and medications for symptom management at this time.         Interval History:     Chart review/discussion with unit or clinical team members:   Granddaughter at bedside.  States patient took a few sips of coffee this morning, able to converse with her and share stories earlier this morning.    Patient endorses pain in her coccyx.  Due for oxycodone soon.  Also asking for ice for her lower back/buttock  area.  Oriented to hospital and family.           Review of Systems:     ROS was reviewed. See notes above           Physical Exam:   Temp:  [98.5  F (36.9  C)] 98.5  F (36.9  C)  Pulse:  [72] 72  Resp:  [12] 12  BP: (139)/(79) 139/79  SpO2:  [90 %] 90 %  138 lbs 7.18 oz    General appearance: fatigued and no distress  Head: Normocephalic, without obvious abnormality, atraumatic  Eyes: lids and lashes normal  Nose: no discharge  Lungs: breathing nonlabored  Abdomen: soft, non-distended  Extremities: no edema noted  Neurologic: Talking, voice soft, oriented to self, family and hospital.            Current Problem List:   Principal Problem:    Closed fracture of multiple ribs of both sides, initial encounter  Active Problems:    Lives in assisted living facility    Falls frequently    Displaced intertrochanteric fracture of left femur, initial encounter for closed fracture (H)    Senile dementia, with behavioral disturbance (H)    Osteoporotic compression fracture of spine, with routine healing, subsequent encounter    DNR (do not resuscitate)    History of recent blood transfusion    Acute blood loss as cause of postoperative anemia    Hospice care      Allergies   Allergen Reactions    Penicillins Anaphylaxis, Rash and Shortness Of Breath    Aspirin Nausea and GI Disturbance    Atenolol Cough    Atorvastatin Muscle Pain (Myalgia) and Nausea and Vomiting    Bupropion Nausea    Cephalexin Rash     Localized to neck area    Clindamycin Other (See Comments)     Constipation     Codeine Nausea    Ibuprofen Nausea and GI Disturbance    Lovastatin Muscle Pain (Myalgia)     35 MINUTES SPENT BY ME on the date of service doing chart review, history, exam, documentation & further activities per the note.

## 2023-11-17 NOTE — PLAN OF CARE
"  Problem: Adult Inpatient Plan of Care  Goal: Patient-Specific Goal (Individualized)  Description: You can add care plan individualizations to a care plan. Examples of Individualization might be:  \"Parent requests to be called daily at 9am for status\", \"I have a hard time hearing out of my right ear\", or \"Do not touch me to wake me up as it startles  me\".  Outcome: Progressing   Goal Outcome Evaluation:       A&O to self and place but fluctuates. Pt on comfort care. Scheduled and PRN medication administered for comfort. Pt is assisted of 2 and Q2 turn. Pt incontinent of bowel and bladder. Incontinent cares provided. Bed alarm activated for safety and call light within reach.                   "

## 2023-11-17 NOTE — PLAN OF CARE
Goal Outcome Evaluation:      Problem: Adult Inpatient Plan of Care  Goal: Absence of Hospital-Acquired Illness or Injury  Intervention: Prevent Skin Injury     Problem: Orthopaedic Fracture  Goal: Optimal Functional Ability  Intervention: Optimize Functional Ability    A&O to self and place but fluctuates. Pt on comfort care. Scheduled and PRN medication administered for comfort. Pt is assisted of 2 and Q2 turn. Pt incontinent of bowel and bladder. Incontinent cares provided. Bed alarm activated for safety and call light within reach.

## 2023-11-18 NOTE — PROGRESS NOTES
[facility]    Medicine Progress Note - Hospitalist Service    Date of Admission:  10/26/2023    AWAITS PLACEMENT- NO CHANGE   11/18/2023    Identification/Summary:   83-year-old with history of chronic dementia, osteoporosis with previous compression fractures of T12, L3, L4 and L5, memory care resident admitted for recurrent falls and pain of left hip.  Diagnosed with left hip fracture intertrochanteric proximal fracture.       [facility] performed left internal fixation on 10/27/2023.  Suture removal on 11/10. Pt developed postoperative anemia which required transfusion and has been stable.  After meeting with palliative care provider, patient has elected for comfort care on 11/2/23.  Care management consulted for Hospice at care facility     Hospital day #22     She had decreased responsiveness on 11/16, but today appears to be more alert and was able to have a conversation with family.  She is oriented to person.  She was seen with palliative care in the room.  No obvious distress noted.  Care management notified and working on discharge        Assessment & Plan   Principal Problem:    Closed fracture of multiple ribs of both sides, initial encounter (10/26/2023)  Active Problems:    DNR (do not resuscitate) (11/5/2023)    Hospice care (11/5/2023)    Lives in assisted living facility (5/16/2020)    Falls frequently (10/26/2023)    Displaced intertrochanteric fracture of left femur, initial encounter for closed fracture (H) (10/26/2023)    Senile dementia, with behavioral disturbance (H) (11/5/2023)    Osteoporotic compression fracture of spine, with routine healing, subsequent encounter (11/5/2023)    History of recent blood transfusion (11/5/2023)    Acute blood loss as cause of postoperative anemia (11/5/2023)              Diet: Advance Diet as Tolerated: Regular Diet Adult    DVT Prophylaxis: Pneumatic Compression Devices  Diaz Catheter: Not present  Lines: None     Cardiac  Monitoring: None  Code Status: No CPR- Do NOT Intubate      Clinically Significant Risk Factors                  # Hypertension: Noted on problem list     # Dementia: noted on problem list               Disposition Plan      Expected Discharge Date: 11/20/2023    Discharge Delays: Placement - LTC  *Medically Ready for Discharge  Destination: assisted living  Discharge Comments: pending hospice placement  family disscussing Hospice care that is private pay            Sea Metz MD  Hospitalist Service  [facility]  Securely message with [employer] (more info)  Text page via [employer] Paging/Directory   ______________________________________________________________________    Interval History   No change awaits placement    Physical Exam   Vital Signs:                    Weight: 138 lbs 7.18 oz    She is in bed profoundly demented no evident pain    Medical Decision Making     15 MINUTES SPENT BY ME on the date of service doing chart review, history, exam, documentation & further activities per the note.        Data   No lab results found in last 7 days.

## 2023-11-18 NOTE — PLAN OF CARE
Problem: Adult Inpatient Plan of Care  Goal: Optimal Comfort and Wellbeing  Outcome: Progressing  Intervention: Monitor Pain and Promote Comfort     Problem: Risk for Delirium  Goal: Improved Sleep  Outcome: Progressing     Problem: Orthopaedic Fracture  Goal: Optimal Pain Control and Function  Outcome: Progressing  Intervention: Manage Acute Orthopaedic-Related Pain       Pt is alert and oriented to self only, on RA. Arouses to voice. Pt is on comfort cares. Q2 turns. Pt incontinent of bowel and bladder. Pt had smear of BM. Incontinence care provided and brief changed. Sacral mepilex in place. Utilized scheduled medications along with non-pharm therapies for pain relief.

## 2023-11-18 NOTE — PROGRESS NOTES
Care Management Follow Up    Length of Stay (days): 23    Expected Discharge Date: 11/20/2023     Concerns to be Addressed: discharge planning     Patient plan of care discussed at interdisciplinary rounds: Yes    Anticipated Discharge Disposition: Assisted Living, Transitional Care     Additional Information:  CM reviewed note from 11/17 and consulted team. Placement pending for discharge.     NAM Munroe

## 2023-11-18 NOTE — PROGRESS NOTES
"SPIRITUAL HEALTH SERVICES ([facility])  SPIRITUAL ASSESSMENT Progress Note  [facility]. [address]    REFERRAL SOURCE: RONA Verde was awake but unable to receive communion. I prayed some traditional Buddhist prayers with her and she responded \"That was nice.\"      PLAN: [facility] will continue to follow during LOS.      Sravanthi Avila, Ph.D., [facility]      [facility] available 24/7 for emergency requests/referrals, either by having the on-call  paged or by entering an ASAP/STAT consult in Epic (this will also page the on-call ).  "

## 2023-11-19 NOTE — PLAN OF CARE
"A&O to self only. Pt was very lethargic this morning until Around 1615 pt woke up and started to play with the call light. Pt told staff that she was hungry and would like to get up. The chair was offered and pt stated \"alittle later\". Bedside RN and pt were able to have several logical and illogical conversations and pt was able to communicate what she would like for dinner when options were given. Pt started to talk about \"a little boy came and saw me today, but I didn't talk to him. What did you tell him?\" Pt was notified that son came to visit today. Pt also was able to communicate pain stating \"it doesn't hurt right now, but it ambrosio hurts all over. Its real bad when I get up.\" After RN and Pt decided on a dinner order pt stated \"I don't have much of an appetite, but ill try since marian ordered it.\" Pt ate 100% of her hamburger, mashed potatoes and banana. RN observed the pt feed herself and peel the banana independently. Pt has been having conversations and laughing frequently with staff since then. A care call was placed out to daughter Eladio by bedside RN to update her that she was more alert and eating dinner. Daughter was grateful for call and is planning to come early tomorrow to be involved with potential discharge.   Pt on comfort care. Scheduled medication administered for comfort. Lidocaine patches applied.  Pt is assisted of 2 and Q2 turn. Bed is converted with the air pump. Pt has not gotten OOB. Wound on sacrum/coccyx is covered with a mepilex that was changed and labeled with today's date. Cream applied to christian area, no brief on in bed to prevent further skin breakdown.  Pt incontinent of bowel and bladder. Pt had a large BM today, and several large urinary episodes that required full bed changes. Incontinent cares provided. Pt was given a bed bath today.  Bed alarm activated for safety and call light within reach.  Son was present at the bedside for most of the afternoon. Discharge pending TCU " patient.        Problem: Adult Inpatient Plan of Care  Goal: Absence of Hospital-Acquired Illness or Injury  Intervention: Prevent Skin Injury      Problem: Adult Inpatient Plan of Care  Goal: Optimal Comfort and Wellbeing  Outcome: Progressing  Intervention: Monitor Pain and Promote Comfort

## 2023-11-19 NOTE — PROGRESS NOTES
[facility]    Medicine Progress Note - Hospitalist Service    Date of Admission:  10/26/2023    AWAITS PLACEMENT- NO CHANGE   11/18/2023    Identification/Summary:   83-year-old with history of chronic dementia, osteoporosis with previous compression fractures of T12, L3, L4 and L5, memory care resident admitted for recurrent falls and pain of left hip.  Diagnosed with left hip fracture intertrochanteric proximal fracture.       [facility] performed left internal fixation on 10/27/2023.  Suture removal on 11/10. Pt developed postoperative anemia which required transfusion and has been stable.  After meeting with palliative care provider, patient has elected for comfort care on 11/2/23.  Care management consulted for Hospice at care facility     Changes today:  -Continue current management        Assessment & Plan   Principal Problem:    Closed fracture of multiple ribs of both sides, initial encounter (10/26/2023)  Active Problems:    DNR (do not resuscitate) (11/5/2023)    Hospice care (11/5/2023)    Lives in assisted living facility (5/16/2020)    Falls frequently (10/26/2023)    Displaced intertrochanteric fracture of left femur, initial encounter for closed fracture (H) (10/26/2023)    Senile dementia, with behavioral disturbance (H) (11/5/2023)    Osteoporotic compression fracture of spine, with routine healing, subsequent encounter (11/5/2023)    History of recent blood transfusion (11/5/2023)    Acute blood loss as cause of postoperative anemia (11/5/2023)              Diet: Advance Diet as Tolerated: Regular Diet Adult    DVT Prophylaxis: Pneumatic Compression Devices  Diaz Catheter: Not present  Lines: None     Cardiac Monitoring: None  Code Status: No CPR- Do NOT Intubate      Clinically Significant Risk Factors                  # Hypertension: Noted on problem list     # Dementia: noted on problem list               Disposition Plan     Expected Discharge Date: 11/20/2023     Discharge Delays: Placement - LTC  *Medically Ready for Discharge  Destination: assisted living  Discharge Comments: pending hospice placement  family disscussing Hospice care that is private pay            Elia Duckworth MD  Hospitalist Service  [facility]  Securely message with [employer] (more info)  Text page via [employer] Paging/Directory   ______________________________________________________________________    Interval History   No change awaits placement    Physical Exam   Vital Signs:                    Weight: 138 lbs 7.18 oz    Resting comfortably in bed    Medical Decision Making     15 MINUTES SPENT BY ME on the date of service doing chart review, history, exam, documentation & further activities per the note.        Data   No lab results found in last 7 days.

## 2023-11-19 NOTE — PLAN OF CARE
Problem: Adult Inpatient Plan of Care  Goal: Absence of Hospital-Acquired Illness or Injury  Intervention: Prevent Skin Injury    Problem: Adult Inpatient Plan of Care  Goal: Optimal Comfort and Wellbeing  Outcome: Progressing       Pt is alert and oriented to self only, on RA. Arouses to voice. Pt is on comfort cares. Q2 turns. Pt incontinent of bowel and bladder. Incontinence care provided and brief changed. Sacral mepilex in place. Utilized scheduled medications along with non-pharm therapies for pain relief. Pt slept for most of shift.

## 2023-11-19 NOTE — PLAN OF CARE
A&O to self only. Has been very lethargic this shift sleeping for most of the day. Awake for short periods of time to take sips of water, medications (crushed in apple sauce) or eat a small amount (applesauce and yogurt). Pt is a total feed.  Pt on comfort care. Scheduled medication administered for comfort. Lidocaine patches applied.  Pt is assisted of 2 and Q2 turn. Bed is converted with the air pump. Pt has not gotten OOB. Wound on sacrum/coccyx is covered with a mepilex. Pt incontinent of bowel and bladder. Incontinent cares provided. Bed alarm activated for safety and call light within reach.  Daughter Eladio was at bedside for about 20 minutes today. Discharge pending TCU patient.      Problem: Orthopaedic Fracture  Goal: Optimal Pain Control and Function  11/18/2023 1819 by Ignacio Marmolejo, RN  Outcome: Progressing  11/18/2023 1818 by Ignacio Marmolejo, RN  Outcome: Progressing  Intervention: Manage Acute Orthopaedic-Related Pain  Recent Flowsheet Documentation  Taken 11/18/2023 1520 by Ignacio Marmolejo, RN  Pain Management Interventions:   medication (see MAR)   rest   repositioned   emotional support  Complementary Therapy: music therapy provided  Taken 11/18/2023 0991 by Ignacio Marmolejo, RN  Complementary Therapy: music therapy provided

## 2023-11-20 NOTE — PROGRESS NOTES
[facility]    Medicine Progress Note - Hospitalist Service    Date of Admission:  10/26/2023    AWAITS PLACEMENT- NO CHANGE   11/18/2023    Identification/Summary:   83-year-old with history of chronic dementia, osteoporosis with previous compression fractures of T12, L3, L4 and L5, memory care resident admitted for recurrent falls and pain of left hip.  Diagnosed with left hip fracture intertrochanteric proximal fracture.       [facility] performed left internal fixation on 10/27/2023.  Suture removal on 11/10. Pt developed postoperative anemia which required transfusion and has been stable.  After meeting with palliative care provider, patient has elected for comfort care on 11/2/23.  Care management consulted for Hospice at care facility     Changes today:  -Continue current management  -Medically stable for discharge        Assessment & Plan   Principal Problem:    Closed fracture of multiple ribs of both sides, initial encounter (10/26/2023)  Active Problems:    DNR (do not resuscitate) (11/5/2023)    Hospice care (11/5/2023)    Lives in assisted living facility (5/16/2020)    Falls frequently (10/26/2023)    Displaced intertrochanteric fracture of left femur, initial encounter for closed fracture (H) (10/26/2023)    Senile dementia, with behavioral disturbance (H) (11/5/2023)    Osteoporotic compression fracture of spine, with routine healing, subsequent encounter (11/5/2023)    History of recent blood transfusion (11/5/2023)    Acute blood loss as cause of postoperative anemia (11/5/2023)              Diet: Advance Diet as Tolerated: Regular Diet Adult    DVT Prophylaxis: Pneumatic Compression Devices  Diaz Catheter: Not present  Lines: None     Cardiac Monitoring: None  Code Status: No CPR- Do NOT Intubate      Clinically Significant Risk Factors                  # Hypertension: Noted on problem list     # Dementia: noted on problem list               Disposition Plan       Expected Discharge Date: 11/21/2023    Discharge Delays: Placement - LTC  *Medically Ready for Discharge  Destination: assisted living  Discharge Comments: pending hospice placement  family disscussing Hospice care that is private pay            Elia Duckworth MD  Hospitalist Service  [facility]  Securely message with [employer] (more info)  Text page via [facility] Paging/Directory   ______________________________________________________________________    Interval History   Patient was awake this morning and looked as though she was in pain, she was able to request some pain medication stating that she had some back pain but was otherwise feeling okay.  Pending placement, discussed with social work at [facility], they are planning to reach out to family today again for placement planning.    Physical Exam   Vital Signs:                    Weight: 138 lbs 7.18 oz    Resting comfortably in bed    Medical Decision Making     40 MINUTES SPENT BY ME on the date of service doing chart review, history, exam, documentation & further activities per the note.        Data   No lab results found in last 7 days.

## 2023-11-20 NOTE — PLAN OF CARE
Problem: Adult Inpatient Plan of Care  Goal: Optimal Comfort and Wellbeing  Outcome: Progressing     Problem: Risk for Delirium  Goal: Improved Sleep  Outcome: Progressing     Problem: Orthopaedic Fracture  Goal: Optimal Pain Control and Function  Outcome: Progressing    Pt is alert and oriented to self only, on RA. Pt was much more alert and talkative during shift. Pt is on comfort cares. Q2 turns. Pt incontinent of bowel and bladder. Incontinence care provided, new brief put on. Sacral mepilex in place. Utilized scheduled medications along with non-pharm therapies for pain relief. Pt did report having a headache. PRN tylenol given with effective results. Oral care done.

## 2023-11-20 NOTE — PROGRESS NOTES
Care Management Follow Up    Length of Stay (days): 25    Expected Discharge Date: 11/21/2023     Concerns to be Addressed: discharge planning     Patient plan of care discussed at interdisciplinary rounds: Yes    Anticipated Discharge Disposition: Assisted Living, Transitional Care     Anticipated Discharge Services: None  Anticipated Discharge DME: None    Patient/family educated on Medicare website which has current facility and service quality ratings: yes  Education Provided on the Discharge Plan: Yes  Patient/Family in Agreement with the Plan: yes    Referrals Placed by CM/SW:  post acute care facilities   Private pay costs discussed: Not applicable    Additional Information:  WILLIS had multiple conversations with Eladio via phone to discuss discharge planning.     Eladio requests referrals to [facility] and [facility].  Eladio previously declined IP hospice placement that had accepted due to cost.  WILLIS discussed that other than [facility], provate pay for hospice facilities will be similar to [facility].  Eladio states understanding and still requests referral to [facility].  Eladio also requests that WILLIS follows up with facilities who had previosuly declined due to bed availability.     Eladio does NOT want [facility], [facility], [facility], or [facility].     [facility]: WILLIS spoke with Irene in admissions.  Fax failed.  WILLIS resent.  Irene does confirm they have beds available in LTC.  Irene will review and get back to WILLIS.   [facility]: WILLIS called and left voice mail for admissions  [facility]: spoke with admission.  Pt added to wait list (anticipate a week wait). Private pay-- $600/day.  $1800 due on day of admit for the first 3 days.  Day 4, $4200 due for the next 7 days.   [facility]: no LTC available  [facility]: no LTC available  [facility]:left voice mail for Didi requesting call back  [facility]  [facility]: left voice mail for Didi requesting call back  [facility]: left voice mail for Kelin in admissions  [facility]: spoke with Diantre in admissions-no beds available  [facility]: WILLIS spoke with admission.  No beds available at this time    3:53 PM  SW left voice mail for Pt's daughter, Eladio with updates.  WILLIS informed Eladio that if [facility] accepts, we will need to proceed with discharge to this facility as Pt is medically appropriate for discharge.   [facility] does have their own hospice to use if facility is able to accept.  Family previously in contact with [facility].     NAM Love

## 2023-11-20 NOTE — PLAN OF CARE
Problem: Adult Inpatient Plan of Care  Goal: Absence of Hospital-Acquired Illness or Injury  Intervention: Prevent Skin Injury  Recent Flowsheet Documentation  Taken 11/20/2023 1019 by Minoo Vanessa RN  Device Skin Pressure Protection: pressure points protected     Pt was alert and responsive than heretofore. She was repositioned q2 hours per order. Pain medications administered as scheduled. Encouraged but declined to sit in chair. Tolerated oral intake.

## 2023-11-21 NOTE — PLAN OF CARE
Pt A&Ox to self, conversational with staff. Family at bedside this afternoon. Dressing to coccyx changed, open wound noted. Incontinent of urine and stool, this shift. Pt stood at edge of bed with assist of 2 staff. Pain managed with scheduled oxycodone. No IV in place. Turned q2h for comfort. Awaiting hospice placement.

## 2023-11-21 NOTE — PLAN OF CARE
Note from [id number]. Per the advanced dementia pain scale and per pt, pain rated 0-10/10 during shift thus far. No new skin issues noted. Incision NAVEEN. Turning and repositioning every 2 hours. Pt quite pleasant today and took all her meds. Medium BM this shift. Pt ate 2 full meals with assistance and independently at times. Education comprehension and physical assessment difficult to obtain d/t pt not being completely alert and oriented. No IV access. Incontinent of bowel and bladder. Education on medication administration and use of call-light to reduce risk for falls and injury. Alarms in place. No further issues noted.    Taylor R Schoenecker, RN

## 2023-11-21 NOTE — PROGRESS NOTES
Care Management Follow Up    Length of Stay (days): 26    Expected Discharge Date: 11/21/2023     Concerns to be Addressed: discharge planning     Patient plan of care discussed at interdisciplinary rounds: Yes    Anticipated Discharge Disposition: Assisted Living, Transitional Care     Anticipated Discharge Services: None  Anticipated Discharge DME: None    Patient/family educated on Medicare website which has current facility and service quality ratings: no  Education Provided on the Discharge Plan: Yes  Patient/Family in Agreement with the Plan: yes    Referrals Placed by CM/SW:    Private pay costs discussed: Not applicable    Additional Information:  11:26 AM  WILLIS placed call to [facility].  Admissions has not received referral.  WILLIS resent referral.    11:32 AM  WILLIS spoke with daughter Eladio via phone and updated her on the above.  WILLIS will continue to keep Eladio updated via phone.    2:24 PM  WILLIS left  for admissions at [facility] requesting return call regarding referral.    PATY Swanson

## 2023-11-21 NOTE — PLAN OF CARE
Goal Outcome Evaluation:       Problem: Adult Inpatient Plan of Care  Goal: Optimal Comfort and Wellbeing  Intervention: Monitor Pain and Promote Comfort  Problem: Adult Inpatient Plan of Care  Goal: Absence of Hospital-Acquired Illness or Injury  Intervention: Prevent and Manage VTE (Venous Thromboembolism) Risk  A& O x 2, self and place. Pt on comfort care. Scheduled medication administered for comfort. Pt is assisted of 2 and Q2 turn. Pt incontinent of bowel and bladder. Incontinent cares provided. Bed bath given. Oral care provided. Bed alarm activated for safety.

## 2023-11-21 NOTE — PROGRESS NOTES
[facility]    Medicine Progress Note - Hospitalist Service    Date of Admission:  10/26/2023        Identification/Summary:   83-year-old with history of chronic dementia, osteoporosis with previous compression fractures of T12, L3, L4 and L5, memory care resident admitted for recurrent falls and pain of left hip.  Diagnosed with left hip fracture intertrochanteric proximal fracture.       [place] [facility] performed left internal fixation on 10/27/2023.  Suture removal on 11/10. Pt developed postoperative anemia which required transfusion and has been stable.  After meeting with palliative care provider, patient has elected for comfort care on 11/2/23.  Care management consulted for Hospice at care facility     Changes today:  -Continue current management  -Medically stable for discharge  -Pending placement         Assessment & Plan   Principal Problem:    Closed fracture of multiple ribs of both sides, initial encounter (10/26/2023)  Active Problems:    DNR (do not resuscitate) (11/5/2023)    Hospice care (11/5/2023)    Lives in assisted living facility (5/16/2020)    Falls frequently (10/26/2023)    Displaced intertrochanteric fracture of left femur, initial encounter for closed fracture (H) (10/26/2023)    Senile dementia, with behavioral disturbance (H) (11/5/2023)    Osteoporotic compression fracture of spine, with routine healing, subsequent encounter (11/5/2023)    History of recent blood transfusion (11/5/2023)    Acute blood loss as cause of postoperative anemia (11/5/2023)              Diet: Advance Diet as Tolerated: Regular Diet Adult    DVT Prophylaxis: Pneumatic Compression Devices  Diaz Catheter: Not present  Lines: None     Cardiac Monitoring: None  Code Status: No CPR- Do NOT Intubate      Clinically Significant Risk Factors                  # Hypertension: Noted on problem list     # Dementia: noted on problem list               Disposition Plan      Expected Discharge  Date: 11/22/2023    Discharge Delays: Placement - LTC  *Medically Ready for Discharge  Destination: assisted living  Discharge Comments: pending hospice placement  family disscussing Hospice care that is private pay            Elia Duckworth MD  Hospitalist Service  [facility]  Securely message with Dk (more info)  Text page via [employer] Paging/Directory   ______________________________________________________________________    Interval History   Since overnight saw patient this morning reported she was in pain went to discussed this with the nurse and the nurse had just given her as needed oxycodone approximately 5 minutes ago.  No other concerns.    Physical Exam   Vital Signs:                    Weight: 138 lbs 7.18 oz    Resting comfortably in bed    Medical Decision Making     25 MINUTES SPENT BY ME on the date of service doing chart review, history, exam, documentation & further activities per the note.        Data   No lab results found in last 7 days.

## 2023-11-22 NOTE — PROGRESS NOTES
Care Management Follow Up    Length of Stay (days): 27    Expected Discharge Date: 11/23/2023     Concerns to be Addressed: discharge planning     Patient plan of care discussed at interdisciplinary rounds: Yes    Anticipated Discharge Disposition: Assisted Living, Transitional Care     Anticipated Discharge Services: None  Anticipated Discharge DME: None    Patient/family educated on Medicare website which has current facility and service quality ratings: no  Education Provided on the Discharge Plan: Yes  Patient/Family in Agreement with the Plan: yes    Referrals Placed by CM/SW:    Private pay costs discussed: Not applicable    Additional Information:  9:33 AM  WILLIS spoke with admissions at [facility].  She will review referral with team and get back to WILLIS with answer around acceptance/denial.    2:08 PM  WILLIS received message from admissions at [facility] stating pt would need to come private pay or apply for MA.  Private pay cost is $1200 per month for private room.  Cost would be around $400-500 per day for cares.  They require a $3500 deposit.    WILLIS left  for daughter Eladio requesting return call to discuss the above.    PATY Swanson

## 2023-11-22 NOTE — PROGRESS NOTES
"[facility] Medicine PROGRESS NOTE      Identification/Summary:   83-year-old with history of chronic dementia, osteoporosis with previous compression fractures of T12, L3, L4 and L5, memory care resident admitted for recurrent falls and pain of left hip.  Diagnosed with left hip fracture intertrochanteric proximal fracture.       [place] [facility] performed left internal fixation on 10/27/2023.  Suture removal on 11/10. Pt developed postoperative anemia which required transfusion and has been stable.  After meeting with palliative care provider, patient has elected for comfort care on 11/2/23.  Care management consulted for Hospice at care facility     Hospital day #27        Assessment & Plan  Palliative care enounter  Left hip fracture  Status post left internal fixation on 10/27.  Appreciate orthopedic recommendations.  Toe-touch weightbearing with walker  Activity as tolerated.   Fall precautions.  Palliative care signed off.  Pain is controlled     Essential hypertension  Osteoporosis  Multiple compression fractures of lumbar and thoracic spine.  Moderate aortic stenosis on echocardiogram.  Lewy body dementia/MSA  Hypothyroidism       Diet: Advance Diet as Tolerated: Regular Diet Adult  DVT Prophylaxis:   On comfort care  Code Status: No CPR- Do NOT Intubate    Clinically Significant Risk Factors                  # Hypertension: Noted on problem list     # Dementia: noted on problem list                  Anticipated possible discharge in few days once hospice care facility milestones are met.    Interval History/Subjective:  Laying in bed.  Appears comfortable.  Requesting for some pain meds.  Intermittently confused.  No family in the room.    Physical Exam/Objective:  Vitals I/O   Vital signs:                  Oxygen Delivery: 2 LPM Height: 167.6 cm (5' 6\") Weight: 62.8 kg (138 lb 7.2 oz)  Estimated body mass index is 22.35 kg/m  as calculated from the following:    Height as of this encounter: 1.676 " "m (5' 6\").    Weight as of this encounter: 62.8 kg (138 lb 7.2 oz).      I/O last 3 completed shifts:  In: 680 [P.O.:680]  Out: -      Body mass index is 22.35 kg/m .    General Appearance:  Alert, cooperative, no distress   Head:  Normocephalic, atraumatic   Eyes:  PERRL    Throat:  mucosa; moist   Neck: No JVD, thyromegaly   Lungs:   Clear to auscultation bilaterally, respirations unlabored   Chest Wall:  No tenderness or deformity   Heart:  Regular rate and rhythm, S1, S2 normal, systolic murmur   Abdomen:   Soft, non tender, non distended, bowel sounds present, no guarding or rigidity   Extremities: No edema, no joint swelling   Skin: Skin color, texture, turgor normal, no rashes or lesions   Neurologic: Alert and oriented X 3, Moves all 4 extremities       Medications:   Personally Reviewed.   carbidopa-levodopa  1 half-tab Oral TID    DULoxetine  60 mg Oral Daily    gabapentin  100 mg Oral TID    levothyroxine  100 mcg Oral Daily    Lidocaine  2 patch Transdermal Q24H    OLANZapine zydis  5 mg Oral BID    oxyCODONE  5 mg Oral Q6H    senna-docusate  1 tablet Oral BID       Data reviewed today: I personally reviewed all new medications, labs, imaging/diagnostics reports over the past 24 hours. Pertinent findings include    Imaging:   No results found for this or any previous visit (from the past 24 hour(s)).       30 MINUTES SPENT BY ME on the date of service doing chart review, history, exam, documentation & further activities per the note.    Joy Montero MD  Hospitalist  [facility]     "

## 2023-11-22 NOTE — PLAN OF CARE
Problem: Adult Inpatient Plan of Care  Goal: Patient-Specific Goal (Individualized)  Outcome: Progressing     Problem: Palliative Care  Goal: Enhanced Quality of Life  Outcome: Progressing  Intervention: Maximize Comfort  Intervention: Optimize Function   Goal Outcome Evaluation:    Continues on comfort cares.  Confused, somnolent, oriented to self only.  Incontinent of bowel and bladder, repositioned Q2H.  Mepilex in place to sacrum.  Patient ate full dinner and is drinking adequately.  Left hip NAVEEN.  Patient denied pain and PAINAD 0-1/10.  Discharge pending hospice placement.

## 2023-11-22 NOTE — PLAN OF CARE
Note from [id number]. Per the advanced dementia pain scale and per pt, pain rated 0-9/10 during shift thus far. No new skin issues noted. Alert to self only, pleasantly confused. Incision NAVEEN. Turning and repositioning every 2 hours. Pt quite pleasant today and took all her meds. No urine OP and/or bowel movement. Bladder scanned for 53cc, fluids encouraged. Good appetite today. Education comprehension and physical assessment difficult to obtain d/t pt not being completely alert and oriented. Education on medication administration and use of call-light to reduce risk for falls and injury. Alarms in place. No further issues noted.    Taylor R Schoenecker, RN

## 2023-11-22 NOTE — PLAN OF CARE
Problem: Pain Acute  Goal: Optimal Pain Control and Function  Outcome: Progressing  Intervention: Develop Pain Management Plan  Recent Flowsheet Documentation  Taken 11/21/2023 2342 by Rowena Cherry RN  Pain Management Interventions: rest  Intervention: Prevent or Manage Pain  Recent Flowsheet Documentation  Taken 11/21/2023 2345 by Rowena Cherry RN  Sensory Stimulation Regulation: care clustered  Sleep/Rest Enhancement:   awakenings minimized   comfort measures   family presence promoted   noise level reduced   regular sleep/rest pattern promoted  Medication Review/Management: medications reviewed  Intervention: Optimize Psychosocial Wellbeing  Recent Flowsheet Documentation  Taken 11/21/2023 2345 by Rowena Cherry RN  Supportive Measures: active listening utilized     Problem: Palliative Care  Goal: Enhanced Quality of Life  Outcome: Progressing  Intervention: Maximize Comfort  Recent Flowsheet Documentation  Taken 11/21/2023 2342 by Rowena Cherry RN  Pain Management Interventions: rest  Intervention: Optimize Function  Recent Flowsheet Documentation  Taken 11/21/2023 2345 by Rowena Cherry RN  Sensory Stimulation Regulation: care clustered  Sleep/Rest Enhancement:   awakenings minimized   comfort measures   family presence promoted   noise level reduced   regular sleep/rest pattern promoted  Intervention: Promote Advance Care Planning  Recent Flowsheet Documentation  Taken 11/21/2023 2345 by Rowena Cherry RN  Life Transition/Adjustment: palliative care initiated  Intervention: Optimize Psychosocial Wellbeing  Recent Flowsheet Documentation  Taken 11/21/2023 2345 by Rowena Cherry RN  Supportive Measures: active listening utilized     Problem: Spinal Surgery  Goal: Absence of Bleeding  Outcome: Progressing  Goal: Effective Bowel Elimination  Outcome: Progressing  Goal: Fluid and Electrolyte Balance  Outcome: Progressing  Goal: Optimal Functional Ability  Outcome:  Progressing  Intervention: Optimize Functional Status  Recent Flowsheet Documentation  Taken 11/22/2023 0431 by Rowena Cherry RN  Positioning/Transfer Devices:   pillows   in use  Taken 11/22/2023 0211 by Rowena Cherry RN  Positioning/Transfer Devices:   pillows   in use  Taken 11/22/2023 0020 by Rowena Cherry RN  Positioning/Transfer Devices:   pillows   in use  Taken 11/22/2023 0015 by Rowena Cherry, RN  Activity Management: back to bed  Taken 11/21/2023 2358 by Rowena Cherry RN  Activity Management: up to bedside commode  Goal: Absence of Infection Signs and Symptoms  Outcome: Progressing  Intervention: Prevent or Manage Infection  Recent Flowsheet Documentation  Taken 11/21/2023 2345 by Rowena Cherry RN  Infection Prevention: rest/sleep promoted  Goal: Optimal Neurologic Function  Outcome: Progressing  Intervention: Optimize Neurologic Function  Recent Flowsheet Documentation  Taken 11/22/2023 0431 by Rowena Cherry RN  Body Position:   turned   left  Taken 11/22/2023 0211 by Rowena Cherry RN  Body Position:   turned   right  Taken 11/22/2023 0020 by Rowena Cherry RN  Body Position:   turned   left  Taken 11/21/2023 2345 by Rowena Cherry RN  Pressure Reduction Devices: (Floating heels & Q2 repositioning) --  Goal: Anesthesia/Sedation Recovery  Outcome: Progressing  Intervention: Optimize Anesthesia Recovery  Recent Flowsheet Documentation  Taken 11/21/2023 2345 by Rowena Cherry RN  Safety Promotion/Fall Prevention:   activity supervised   clutter free environment maintained   increase visualization of patient   nonskid shoes/slippers when out of bed   patient and family education   room door open   room near nurse's station   room organization consistent   safety round/check completed   supervised activity  Reorientation Measures:   clock in view   familiar social contact encouraged   reorientation provided  Goal: Optimal Pain Control and Function  Outcome:  Progressing  Intervention: Prevent or Manage Pain  Recent Flowsheet Documentation  Taken 11/21/2023 2342 by Rowena Cherry RN  Pain Management Interventions: rest  Goal: Nausea and Vomiting Relief  Outcome: Progressing  Goal: Effective Urinary Elimination  Outcome: Progressing  Goal: Effective Oxygenation and Ventilation  Outcome: Progressing  Intervention: Optimize Oxygenation and Ventilation  Recent Flowsheet Documentation  Taken 11/22/2023 0431 by Rowena Cherry RN  Head of Bed (HOB) Positioning: HOB at 20-30 degrees  Taken 11/22/2023 0211 by Rowena Cherry RN  Head of Bed (HOB) Positioning: HOB at 20-30 degrees  Taken 11/22/2023 0020 by Rowena Cherry RN  Head of Bed (HOB) Positioning: HOB at 20-30 degrees     Patient vital signs are at baseline: No,  Reason:  Not taking vitals per palliative orders  Patient able to ambulate as they were prior to admission or with assist devices provided by therapies during their stay:  No,  Reason:  Pt is up w/ a heavy assist of 2 to the bedside commode  Patient MUST void prior to discharge:  Yes  Patient able to tolerate oral intake:  Yes  Pain has adequate pain control using Oral analgesics:  Yes  Does patient have an identified :  Yes  Has goal D/C date and time been discussed with patient:  Yes    Pt is A & O to self only. Pt endorses mild to moderate pain during today's shift utilizing the PAINAD scale. Utilizing scheduled PO liquid Oxycodone w/ effective results. Surgical incision is open to air & WNL. Changed coccyx mepilex. Incontinent of bowel & bladder; Voiding spontaneously; Had a small BM on NOC. Up to the bedside commode w/ a heavy assist of 2, walker & gait belt. No IV access. Tolerating a regular diet. No VS taken d/t palliative orders. Anticipating discharge pending hospice placement.      ADRIENNE Anderson  Shift: 1500 - 0730

## 2023-11-22 NOTE — PROGRESS NOTES
SPIRITUAL HEALTH SERVICES (SHS)  SPIRITUAL ASSESSMENT Progress Note  [facility]. [address]    REFERRAL SOURCE: RONA Verde was awake when I entered the Room. I prayed some traditional [employer] prayers and at times she joined in, silently moving her lips.      PLAN: [facility] will follow as able during remainder of LOS     Sravanthilucila Avila, Ph.D., [facility]      SHS available 24/7 for emergency requests/referrals, either by having the on-call  paged or by entering an ASAP/STAT consult in Epic (this will also page the on-call ).

## 2023-11-23 NOTE — PLAN OF CARE
Pt was slightly sedated this morning. Was not awake enough to administer medications. At noon, the patient is now alert and reporting hip and a headache. PRN oxy sol and tylenol given for pain. Meds crushed in applesauce. Thin liquids. 1 soft incont BM and urine cleaned at 1300. Sacrum mepliex changed.     2 Lidocaine patches applied to lower back and left hip.     Comfort Cares Continued. Placement pending.    Liam Giron RN, ONC

## 2023-11-23 NOTE — PLAN OF CARE
Problem: Adult Inpatient Plan of Care  Goal: Absence of Hospital-Acquired Illness or Injury  Intervention: Prevent Skin Injury     Problem: Adult Inpatient Plan of Care  Goal: Optimal Comfort and Wellbeing  Outcome: Unable to Meet       Pt is alert and was oriented to self and place. On RA. Pt is on comfort cares. Q2 turns. Pt incontinent of bowel and bladder. Pt did have BMx1 during shift. Incontinence care provided, gown and linens changed. Sacral mepilex in place. Utilized scheduled medications along with non-pharm therapies for pain relief.

## 2023-11-23 NOTE — PROGRESS NOTES
"[facility] Medicine PROGRESS NOTE      Identification/Summary:   83-year-old with history of chronic dementia, osteoporosis with previous compression fractures of T12, L3, L4 and L5, memory care resident admitted for recurrent falls and pain of left hip.  Diagnosed with left hip fracture intertrochanteric proximal fracture.       [place] [facility] performed left internal fixation on 10/27/2023.  Suture removal on 11/10. Pt developed postoperative anemia which required transfusion and has been stable.  After meeting with palliative care provider, patient has elected for comfort care on 11/2/23.  Care management consulted for Hospice at care facility     Hospital day #28        Assessment & Plan  Palliative care enounter  Left hip fracture  Status post left internal fixation on 10/27.  Appreciate orthopedic recommendations.  Toe-touch weightbearing with walker  Activity as tolerated.   Fall precautions.  Palliative care signed off.  Pain is controlled     Essential hypertension  Osteoporosis  Multiple compression fractures of lumbar and thoracic spine.  Moderate aortic stenosis on echocardiogram.  Lewy body dementia/MSA  Hypothyroidism       Diet: Advance Diet as Tolerated: Regular Diet Adult  DVT Prophylaxis:   On comfort care  Code Status: No CPR- Do NOT Intubate    Clinically Significant Risk Factors                  # Hypertension: Noted on problem list     # Dementia: noted on problem list                  Anticipated possible discharge in few days once hospice care facility milestones are met.    Interval History/Subjective:  Resting comfortably in bed.  No other new overnight events noted.    Physical Exam/Objective:  Vitals I/O   Vital signs:                  Oxygen Delivery: 2 LPM Height: 167.6 cm (5' 6\") Weight: 62.8 kg (138 lb 7.2 oz)  Estimated body mass index is 22.35 kg/m  as calculated from the following:    Height as of this encounter: 1.676 m (5' 6\").    Weight as of this encounter: 62.8 kg " (138 lb 7.2 oz).      I/O last 3 completed shifts:  In: 200 [P.O.:200]  Out: -      Body mass index is 22.35 kg/m .    General Appearance:  Alert, cooperative, no distress   Head:  Normocephalic, atraumatic   Eyes:  PERRL    Throat:  mucosa; moist   Neck: No JVD, thyromegaly   Lungs:   Clear to auscultation bilaterally, respirations unlabored   Chest Wall:  No tenderness or deformity   Heart:  Regular rate and rhythm, S1, S2 normal, systolic murmur   Abdomen:   Soft, non tender, non distended, bowel sounds present, no guarding or rigidity   Extremities: No edema, no joint swelling   Skin: Skin color, texture, turgor normal, no rashes or lesions   Neurologic: Alert and oriented X 3, Moves all 4 extremities       Medications:   Personally Reviewed.   carbidopa-levodopa  1 half-tab Oral TID    DULoxetine  60 mg Oral Daily    gabapentin  100 mg Oral TID    levothyroxine  100 mcg Oral Daily    Lidocaine  2 patch Transdermal Q24H    OLANZapine zydis  5 mg Oral BID    oxyCODONE  5 mg Oral Q6H    senna-docusate  1 tablet Oral BID       Data reviewed today: I personally reviewed all new medications, labs, imaging/diagnostics reports over the past 24 hours. Pertinent findings include    Imaging:   No results found for this or any previous visit (from the past 24 hour(s)).       30 MINUTES SPENT BY ME on the date of service doing chart review, history, exam, documentation & further activities per the note.    Joy Montero MD  Hospitalist  [facility]

## 2023-11-23 NOTE — PROGRESS NOTES
Care Management Follow Up    Length of Stay (days): 28    Expected Discharge Date: 11/24/2023     Concerns to be Addressed: discharge planning     Patient plan of care discussed at interdisciplinary rounds: Yes    Anticipated Discharge Disposition: Assisted Living, Transitional Care     Anticipated Discharge Services: None  Anticipated Discharge DME: None    Patient/family educated on Medicare website which has current facility and service quality ratings: no  Education Provided on the Discharge Plan: Yes  Patient/Family in Agreement with the Plan: yes    Referrals Placed by CM/SW:  skilled nursing and LTC, hospice  Private pay costs discussed: Not applicable    Additional Information:  SW previously spoke with admissions at [facility]. They indicate pt would need to come private pay or apply for MA.  Private pay cost is $1200 per month for private room.  Cost would be around $400-500 per day for cares.  They require a $3500 deposit. Yesterday, WILLIS left VM for daughter Eladio requesting return call to discuss the above. Awaiting return call. Referral also has been made to [facility].    PATY Patel

## 2023-11-24 NOTE — PROGRESS NOTES
Care Management Follow Up    Length of Stay (days): 29    Expected Discharge Date: 11/27/2023     Concerns to be Addressed: discharge planning     Patient plan of care discussed at interdisciplinary rounds: Yes    Anticipated Discharge Disposition: Assisted Living, Transitional Care     Anticipated Discharge Services: None  Anticipated Discharge DME: None    Patient/family educated on Medicare website which has current facility and service quality ratings: no  Education Provided on the Discharge Plan: Yes  Patient/Family in Agreement with the Plan: yes    Referrals Placed by CM/SW:    Private pay costs discussed: Not applicable    Additional Information:  1:48 PM  WILLIS spoke with Irene at [facility]. She reports bed is available but it would be private pay.  WILLIS spoke with daughter Eladio and she will reach out to Irene to discuss costs further and get back to WILLIS with decision.    2:51 PM  WILLIS spoke with daughter Eladio via phone.  She states she is going to complete an MA/LTC application (per Irene's recommendation) and speak with pt's CM.  She is working on the steps to move forward with [facility] LTC placement.  She will keep CM updated.    PATY Swanson

## 2023-11-24 NOTE — PROGRESS NOTES
Pt is alert and was oriented to self only. Very talkative during shift. On RA. Pt is on comfort cares. Q2 turns. Pt incontinent of bowel and bladder. Pt did have BM x1 during shift. Incontinence care provided. Sacral mepilex in place. Utilized scheduled medications along with non-pharm therapies for pain relief.

## 2023-11-24 NOTE — PLAN OF CARE
Pt is A & O to self only. Pt endorses mild pain during today's shift utilizing the PAINAD scale. Utilizing scheduled PO liquid Oxycodone w/ effective results. Surgical incision is open to air & WNL. Incontinent of bowel & bladder; Voiding spontaneously; Had a smear BM on this evening. Up to the bedside commode w/ a heavy assist of 2, walker & gait belt but did not get out of bed during today's shift. Q2 repositioning as pt tolerates. Family at the bedside during dinner. Able to self-feed after tray set-up. No IV access. Tolerating a regular diet. No VS taken d/t palliative orders. Anticipating discharge pending hospice placement.      ADRIENNE Anderson  Shift: 1500 - 7530

## 2023-11-24 NOTE — PROGRESS NOTES
"[facility] Medicine PROGRESS NOTE      Identification/Summary:   83-year-old with history of chronic dementia, osteoporosis with previous compression fractures of T12, L3, L4 and L5, memory care resident admitted for recurrent falls and pain of left hip.  Diagnosed with left hip fracture intertrochanteric proximal fracture.       [facility] [place] [facility] performed left internal fixation on 10/27/2023.  Suture removal on 11/10. Pt developed postoperative anemia which required transfusion and has been stable.  After meeting with palliative care provider, patient has elected for comfort care on 11/2/23.  Care management consulted for Hospice at care facility     Hospital day #29        Assessment & Plan  Palliative care enounter  Left hip fracture  Status post left internal fixation on 10/27.  Appreciate orthopedic recommendations.  Toe-touch weightbearing with walker  Activity as tolerated.   Fall precautions.  Palliative care signed off.  Pain is controlled     Essential hypertension  Osteoporosis  Multiple compression fractures of lumbar and thoracic spine.  Moderate aortic stenosis on echocardiogram.  Lewy body dementia/MSA  Hypothyroidism       Diet: Advance Diet as Tolerated: Regular Diet Adult  DVT Prophylaxis:   On comfort care  Code Status: No CPR- Do NOT Intubate    Clinically Significant Risk Factors                  # Hypertension: Noted on problem list     # Dementia: noted on problem list                  Anticipated possible discharge in few days once hospice care facility milestones are met.    Interval History/Subjective:  Resting comfortably in bed.  No other new overnight events noted.    Physical Exam/Objective:  Vitals I/O   Vital signs:                  Oxygen Delivery: 2 LPM Height: 167.6 cm (5' 6\") Weight: 62.8 kg (138 lb 7.2 oz)  Estimated body mass index is 22.35 kg/m  as calculated from the following:    Height as of this encounter: 1.676 m (5' 6\").    Weight as of this encounter: 62.8 kg " (138 lb 7.2 oz).      No intake/output data recorded.     Body mass index is 22.35 kg/m .    General Appearance:  Alert, cooperative, no distress   Head:  Normocephalic, atraumatic   Lungs:   Clear to auscultation bilaterally, respirations unlabored   Chest Wall:  No tenderness or deformity   Heart:  Regular rate and rhythm, S1, S2 normal, systolic murmur   Abdomen:   Soft, non tender, non distended, bowel sounds present, no guarding or rigidity   Extremities: No edema, no joint swelling   Skin: Skin color, texture, turgor normal, no rashes or lesions   Neurologic: Alert Moves all 4 extremities       Medications:   Personally Reviewed.   carbidopa-levodopa  1 half-tab Oral TID    DULoxetine  60 mg Oral Daily    gabapentin  100 mg Oral TID    levothyroxine  100 mcg Oral Daily    Lidocaine  2 patch Transdermal Q24H    OLANZapine zydis  5 mg Oral BID    oxyCODONE  5 mg Oral Q6H    senna-docusate  1 tablet Oral BID       Data reviewed today: I personally reviewed all new medications, labs, imaging/diagnostics reports over the past 24 hours. Pertinent findings include    Imaging:   No results found for this or any previous visit (from the past 24 hour(s)).       30 MINUTES SPENT BY ME on the date of service doing chart review, history, exam, documentation & further activities per the note.    Joy Montero MD  Hospitalist  [facility]

## 2023-11-25 NOTE — PROGRESS NOTES
Oriented to self only. Awake and alert throughout majority of the day. Ate 3 meals today. Was able to pivot transfer with assist of 1 to the commode and recliner today. Large BM today. Turned and repositioned. Cooperative with cares.

## 2023-11-25 NOTE — PROGRESS NOTES
"[facility] Medicine PROGRESS NOTE      Identification/Summary:   83-year-old with history of chronic dementia, osteoporosis with previous compression fractures of T12, L3, L4 and L5, memory care resident admitted for recurrent falls and pain of left hip.  Diagnosed with left hip fracture intertrochanteric proximal fracture.       [facility] performed left internal fixation on 10/27/2023.  Suture removal on 11/10. Pt developed postoperative anemia which required transfusion and has been stable.  After meeting with palliative care provider, patient has elected for comfort care on 11/2/23.  Care management consulted for Hospice at care facility     Hospital day #30        Assessment & Plan  Palliative care enounter  Left hip fracture  Status post left internal fixation on 10/27.  Appreciate orthopedic recommendations.  Toe-touch weightbearing with walker  Activity as tolerated.   Fall precautions.  Palliative care signed off.  Pain is controlled     Essential hypertension  Osteoporosis  Multiple compression fractures of lumbar and thoracic spine.  Moderate aortic stenosis on echocardiogram.  Lewy body dementia/MSA  Hypothyroidism       Diet: Advance Diet as Tolerated: Regular Diet Adult  DVT Prophylaxis:   On comfort care  Code Status: No CPR- Do NOT Intubate    Clinically Significant Risk Factors                  # Hypertension: Noted on problem list     # Dementia: noted on problem list                  Anticipated possible discharge in few days once hospice care facility milestones are met.    Interval History/Subjective:  Resting comfortably in bed.  No other new overnight events noted.    Physical Exam/Objective:  Vitals I/O   Vital signs:                  Oxygen Delivery: 2 LPM Height: 167.6 cm (5' 6\") Weight: 62.8 kg (138 lb 7.2 oz)  Estimated body mass index is 22.35 kg/m  as calculated from the following:    Height as of this encounter: 1.676 m (5' 6\").    Weight as of this encounter: 62.8 kg " (138 lb 7.2 oz).      I/O last 3 completed shifts:  In: 240 [P.O.:240]  Out: 350 [Urine:350]     Body mass index is 22.35 kg/m .    General Appearance:  Alert, cooperative, no distress   Head:  Normocephalic, atraumatic   Lungs:   Clear to auscultation bilaterally, respirations unlabored   Chest Wall:  No tenderness or deformity   Heart:  Regular rate and rhythm, S1, S2 normal, systolic murmur   Abdomen:   Soft, non tender, non distended, bowel sounds present, no guarding or rigidity   Extremities: No edema, no joint swelling   Skin: Skin color, texture, turgor normal, no rashes or lesions   Neurologic: Alert Moves all 4 extremities       Medications:   Personally Reviewed.   carbidopa-levodopa  1 half-tab Oral TID    DULoxetine  60 mg Oral Daily    gabapentin  100 mg Oral TID    levothyroxine  100 mcg Oral Daily    Lidocaine  2 patch Transdermal Q24H    OLANZapine zydis  5 mg Oral BID    oxyCODONE  5 mg Oral Q6H    senna-docusate  1 tablet Oral BID       Data reviewed today: I personally reviewed all new medications, labs, imaging/diagnostics reports over the past 24 hours. Pertinent findings include    Imaging:   No results found for this or any previous visit (from the past 24 hour(s)).       30 MINUTES SPENT BY ME on the date of service doing chart review, history, exam, documentation & further activities per the note.    Joy Montero MD  Hospitalist  [facility]

## 2023-11-25 NOTE — PROGRESS NOTES
On comfort cares, Alert and oriented to self only, confused, slept comfortably throughout the shift, encouraged fluid intake, provided oral cares, repositioned as tolerable, surgical site CDI and open to air, sacral Mepilex in place.

## 2023-11-25 NOTE — PROGRESS NOTES
Pt is alert and oriented to self only, does not calls appropriately for needs. Assist of 1 with gait belt and walker with ambulation. Vitals signs are stable, afebrile, oxygen on room air. Pt is tolerating regular diet. Patient has no IV access. Pt is voiding appropriately, last bowel movement today 11-25-23. Pt complains of pain 6/10, to abdomen, scheduled oxycodone given for this, with relief. Alarms in place.      Plan:   Pt is on comfort care. Planning to discharge to TCU with hospice help Monday or Tuesday.     Ava Núñez RN  November 25, 2023, 3:38 PM

## 2023-11-26 NOTE — PROGRESS NOTES
"[facility] Medicine PROGRESS NOTE      Identification/Summary:   83-year-old with history of chronic dementia, osteoporosis with previous compression fractures of T12, L3, L4 and L5, memory care resident admitted for recurrent falls and pain of left hip.  Diagnosed with left hip fracture intertrochanteric proximal fracture.       [facility] performed left internal fixation on 10/27/2023.  Suture removal on 11/10. Pt developed postoperative anemia which required transfusion and has been stable.  After meeting with palliative care provider, patient has elected for comfort care on 11/2/23.  Care management consulted for Hospice at care facility     Hospital day #31        Assessment & Plan  Palliative care enounter  Left hip fracture  Status post left internal fixation on 10/27.  Appreciate orthopedic recommendations.  Toe-touch weightbearing with walker  Activity as tolerated.   Fall precautions.  Palliative care signed off.  Pain is controlled     Essential hypertension  Osteoporosis  Multiple compression fractures of lumbar and thoracic spine.  Moderate aortic stenosis on echocardiogram.  Lewy body dementia/MSA  Hypothyroidism       Diet: Advance Diet as Tolerated: Regular Diet Adult  DVT Prophylaxis:   On comfort care  Code Status: No CPR- Do NOT Intubate    Clinically Significant Risk Factors                  # Hypertension: Noted on problem list     # Dementia: noted on problem list                  Anticipated possible discharge in few days once hospice care facility milestones are met.    Interval History/Subjective:  Resting comfortably in bed.  No other new overnight events noted.    Physical Exam/Objective:  Vitals I/O   Vital signs:                O2 Device: None (Room air) Oxygen Delivery: 2 LPM Height: 167.6 cm (5' 6\") Weight: 62.8 kg (138 lb 7.2 oz)  Estimated body mass index is 22.35 kg/m  as calculated from the following:    Height as of this encounter: 1.676 m (5' 6\").    Weight as of " this encounter: 62.8 kg (138 lb 7.2 oz).      I/O last 3 completed shifts:  In: 360 [P.O.:360]  Out: 300 [Urine:300]     Body mass index is 22.35 kg/m .    General Appearance:  Alert, cooperative, no distress   Head:  Normocephalic, atraumatic   Lungs:   Clear to auscultation bilaterally, respirations unlabored   Chest Wall:  No tenderness or deformity   Heart:  Regular rate and rhythm, S1, S2 normal, systolic murmur   Abdomen:   Soft, non tender, non distended, bowel sounds present, no guarding or rigidity   Extremities: No edema, no joint swelling   Skin: Skin color, texture, turgor normal, no rashes or lesions   Neurologic: Alert Moves all 4 extremities       Medications:   Personally Reviewed.   carbidopa-levodopa  1 half-tab Oral TID    DULoxetine  60 mg Oral Daily    gabapentin  100 mg Oral TID    levothyroxine  100 mcg Oral Daily    Lidocaine  2 patch Transdermal Q24H    OLANZapine zydis  5 mg Oral BID    oxyCODONE  5 mg Oral Q6H    senna-docusate  1 tablet Oral BID       Data reviewed today: I personally reviewed all new medications, labs, imaging/diagnostics reports over the past 24 hours. Pertinent findings include    Imaging:   No results found for this or any previous visit (from the past 24 hour(s)).       20 MINUTES SPENT BY ME on the date of service doing chart review, history, exam, documentation & further activities per the note.    Joy Montero MD  Hospitalist  [facility]

## 2023-11-26 NOTE — PROGRESS NOTES
Patient remains comfortable. Tolerates q2 turns. Patient having decreased appetite and PO liquid intake. Patient tolerates pills crushed in applesauce.

## 2023-11-26 NOTE — PROGRESS NOTES
On comfort cares, alert and oriented to self only,  did not sleep at night, repositioned as tolerable, voiding, incontinent of bladder, appeared comfortable, surgical site CDI, encouraged fluid intake and oral cares provided, ate some oatmeal.

## 2023-11-27 NOTE — PROGRESS NOTES
Patient A&O x1, disoriented to place, time, and situation. On comfort cares, slept comfortably throughout shift. No signs of pain using PAINAD scale, administering scheduled Oxycodone. No IV access. Q2 turns, tolerating well. Incontinent of bladder and bowel, 1 smear of stool during shift. Takes pills crushed in applesauce. Surgical site CDI and open to air. Regular diet, ate some pudding. Encouraging fluid intake. Assist of 1 with walker and gait belt with pivot, toe touch weight-bearing. Not out of bed during shift. Alarms on for safety. Discharge pending.

## 2023-11-27 NOTE — PLAN OF CARE
Note from [id number]. Per the advanced dementia pain scale and per pt, pain rated 0-5/10 during shift thus far. No new skin issues noted. Turning and repositioning every 2 hours. Dressing to buttocks is CDI. Hip incision NAVEEN and WNL. Alert to self only. Education comprehension difficult to obtain d/t pt only being alert to self. No IV access. Voiding adequately, incontinent. Education on medication administration and use of call-light to reduce risk for falls and injury. Alarms in place. No further issues noted.    Taylor R Schoenecker, RN

## 2023-11-27 NOTE — PROGRESS NOTES
"[facility] Medicine PROGRESS NOTE      Identification/Summary:   83-year-old with history of chronic dementia, osteoporosis with previous compression fractures of T12, L3, L4 and L5, memory care resident admitted for recurrent falls and pain of left hip.  Diagnosed with left hip fracture intertrochanteric proximal fracture.       [place] [facility] performed left internal fixation on 10/27/2023.  Suture removal on 11/10. Pt developed postoperative anemia which required transfusion and has been stable.  After meeting with palliative care provider, patient has elected for comfort care on 11/2/23.  Care management consulted for Hospice at care facility     Hospital day #32        Assessment & Plan  Palliative care enounter  Left hip fracture  Status post left internal fixation on 10/27.  Appreciate orthopedic recommendations.  Toe-touch weightbearing with walker  Activity as tolerated.   Fall precautions.  Palliative care signed off.  Pain is controlled     Essential hypertension  Osteoporosis  Multiple compression fractures of lumbar and thoracic spine.  Moderate aortic stenosis on echocardiogram.  Lewy body dementia/MSA  Hypothyroidism       Diet: Advance Diet as Tolerated: Regular Diet Adult  DVT Prophylaxis:   On comfort care  Code Status: No CPR- Do NOT Intubate    Clinically Significant Risk Factors                  # Hypertension: Noted on problem list     # Dementia: noted on problem list                  Anticipated possible discharge in few days once hospice care facility milestones are met.    Interval History/Subjective:  Resting comfortably in bed.  No other new overnight events noted.    Physical Exam/Objective:  Vitals I/O   Vital signs:                O2 Device: None (Room air) Oxygen Delivery: 2 LPM Height: 167.6 cm (5' 6\") Weight: 62.8 kg (138 lb 7.2 oz)  Estimated body mass index is 22.35 kg/m  as calculated from the following:    Height as of this encounter: 1.676 m (5' 6\").    Weight as of " this encounter: 62.8 kg (138 lb 7.2 oz).      I/O last 3 completed shifts:  In: 120 [P.O.:120]  Out: -      Body mass index is 22.35 kg/m .    General Appearance:  Alert, cooperative, no distress   Head:  Normocephalic, atraumatic   Lungs:   Clear to auscultation bilaterally, respirations unlabored   Chest Wall:  No tenderness or deformity   Heart:  Regular rate and rhythm, S1, S2 normal, systolic murmur   Abdomen:   Soft, non tender, non distended, bowel sounds present, no guarding or rigidity   Extremities: No edema, no joint swelling   Skin: Skin color, texture, turgor normal, no rashes or lesions   Neurologic: Alert Moves all 4 extremities       Medications:   Personally Reviewed.   carbidopa-levodopa  1 half-tab Oral TID    DULoxetine  60 mg Oral Daily    gabapentin  100 mg Oral TID    levothyroxine  100 mcg Oral Daily    Lidocaine  2 patch Transdermal Q24H    OLANZapine zydis  5 mg Oral BID    oxyCODONE  5 mg Oral Q6H    senna-docusate  1 tablet Oral BID       Data reviewed today: I personally reviewed all new medications, labs, imaging/diagnostics reports over the past 24 hours. Pertinent findings include    Imaging:   No results found for this or any previous visit (from the past 24 hour(s)).       20 MINUTES SPENT BY ME on the date of service doing chart review, history, exam, documentation & further activities per the note.    Joy Montero MD  Hospitalist  [facility]

## 2023-11-28 NOTE — PROGRESS NOTES
"Care Management Follow Up    Length of Stay (days): 33    Expected Discharge Date: 11/28/2023     Concerns to be Addressed: discharge planning     Patient plan of care discussed at interdisciplinary rounds: Yes    Anticipated Discharge Disposition: Long Term Care with Hospice     Anticipated Discharge Services: Transportation Services  Anticipated Discharge DME: None    Patient/family educated on Medicare website which has current facility and service quality ratings: no  Education Provided on the Discharge Plan: Yes  Patient/Family in Agreement with the Plan: yes    Referrals Placed by CM/SW:  No new referrals placed  Private pay costs discussed: transportation costs    Additional Information:  Writer left voice mail [facility] asking for call back checking if LTC available for patient-please update.    Writer spoke to daughter Eladio([phone]) to update still waiting to hear from [facility] if can accept-Will also reach out to [facility] for LTC for global search-Writer spoke to Carisa with [facility] who confirmed patient has MA -Global search sent to [facility].    Writer spoke to Aura at [facility]-who will again check with business office to see if able to take patient and if any spend down needed in addition to LTC application.     UPDATE:  Aura from [facility] calling back to update facility was in error before stating patient does not have MA-[facility] business office confirmed patient does have MA and just waiting for LTC Managers to \"sign off\" on patient coming to facility with hospice.     Leia Mike CM      "

## 2023-11-28 NOTE — PROGRESS NOTES
"[facility] Medicine PROGRESS NOTE      Identification/Summary:   83-year-old with history of chronic dementia, osteoporosis with previous compression fractures of T12, L3, L4 and L5, memory care resident admitted for recurrent falls and pain of left hip.  Diagnosed with left hip fracture intertrochanteric proximal fracture.       [place] [facility] performed left internal fixation on 10/27/2023.  Suture removal on 11/10. Pt developed postoperative anemia which required transfusion and has been stable.  After meeting with palliative care provider, patient has elected for comfort care on 11/2/23.  Care management consulted for Hospice at care facility     Hospital day #33        Assessment & Plan  Palliative care enounter  Left hip fracture  Status post left internal fixation on 10/27.  Appreciate orthopedic recommendations.  Toe-touch weightbearing with walker  Activity as tolerated.   Fall precautions.  Palliative care signed off.  Pain is controlled     Essential hypertension  Osteoporosis  Multiple compression fractures of lumbar and thoracic spine.  Moderate aortic stenosis on echocardiogram.  Lewy body dementia/MSA  Hypothyroidism       Diet: Advance Diet as Tolerated: Regular Diet Adult  DVT Prophylaxis:   On comfort care  Code Status: No CPR- Do NOT Intubate    Clinically Significant Risk Factors                  # Hypertension: Noted on problem list     # Dementia: noted on problem list                  Anticipated possible discharge in few days once hospice care facility milestones are met.    Interval History/Subjective:  Resting comfortably in bed.  No other new overnight events noted.    Physical Exam/Objective:  Vitals I/O   Vital signs:                  Oxygen Delivery: 2 LPM Height: 167.6 cm (5' 6\") Weight: 62.8 kg (138 lb 7.2 oz)  Estimated body mass index is 22.35 kg/m  as calculated from the following:    Height as of this encounter: 1.676 m (5' 6\").    Weight as of this encounter: 62.8 kg " (138 lb 7.2 oz).      I/O last 3 completed shifts:  In: 120 [P.O.:120]  Out: -      Body mass index is 22.35 kg/m .    General Appearance:  Alert, cooperative, no distress   Head:  Normocephalic, atraumatic   Lungs:   Clear to auscultation bilaterally, respirations unlabored   Chest Wall:  No tenderness or deformity   Heart:  Regular rate and rhythm, S1, S2 normal, systolic murmur   Abdomen:   Soft, non tender, non distended, bowel sounds present, no guarding or rigidity   Extremities: No edema, no joint swelling   Skin: Skin color, texture, turgor normal, no rashes or lesions   Neurologic: Alert Moves all 4 extremities       Medications:   Personally Reviewed.   carbidopa-levodopa  1 half-tab Oral TID    DULoxetine  60 mg Oral Daily    gabapentin  100 mg Oral TID    levothyroxine  100 mcg Oral Daily    Lidocaine  2 patch Transdermal Q24H    OLANZapine zydis  5 mg Oral BID    oxyCODONE  5 mg Oral Q6H    senna-docusate  1 tablet Oral BID       Data reviewed today: I personally reviewed all new medications, labs, imaging/diagnostics reports over the past 24 hours. Pertinent findings include    Imaging:   No results found for this or any previous visit (from the past 24 hour(s)).       20 MINUTES SPENT BY ME on the date of service doing chart review, history, exam, documentation & further activities per the note.    Joy Montero MD  Hospitalist  [facility]

## 2023-11-28 NOTE — PROGRESS NOTES
Patient is alert to self and place. Complains of abdominal pain and able to rate pain, 8/10. Pain managed per MAR with good effect. Repositioning every 2 hours with incontinence care. Hip incision is WNL and open to air. New mepi applied to buttocks/coccyx this shift. No IV access. Fluid intake encouraged. No ambulation during shift. Alarms in place.

## 2023-11-29 NOTE — PLAN OF CARE
Goal Outcome Evaluation:      Problem: Adult Inpatient Plan of Care  Goal: Absence of Hospital-Acquired Illness or Injury  Intervention: Prevent Skin Injury    Problem: Adult Inpatient Plan of Care  Goal: Optimal Comfort and Wellbeing  Intervention: Monitor Pain and Promote Comfort    A&O x2, self and place. Pt on comfort care. Pt is Q2 turn and assist of 2. Pain managed with PRN oral Tylenol. Pt incontinence of bowel and bladder. Incontinent cares provided. Bed alarm activated for safety.

## 2023-11-29 NOTE — PLAN OF CARE
Problem: Pain Acute  Goal: Optimal Pain Control and Function  Outcome: Progressing  Intervention: Prevent or Manage Pain  Recent Flowsheet Documentation  Taken 11/29/2023 0845 by Aga Hager, RN  Sensory Stimulation Regulation:   care clustered   lighting decreased     Problem: Palliative Care  Goal: Enhanced Quality of Life  Outcome: Progressing  Intervention: Maximize Comfort  Recent Flowsheet Documentation  Taken 11/29/2023 1000 by Aga Hager, RN  Oral Care:   teeth brushed   swabbed with sterile water  Intervention: Optimize Function  Recent Flowsheet Documentation  Taken 11/29/2023 0845 by Aga Hager, RN  Sensory Stimulation Regulation:   care clustered   lighting decreased   Goal Outcome Evaluation:  Pt disoriented x 4, orient to self only.  Sleepy this afternoon, had some difficulty getting her to take PO medications;  pt appears comfortable, no grimacing or moaning.  Did not answer to 'yes/no' question of pain; received only one dose scheduled roxycodone.  Pt is Comfort Care; awaiting placement; discharge date unknown at this time.

## 2023-11-29 NOTE — PROGRESS NOTES
Care Management Follow Up    Length of Stay (days): 34    Expected Discharge Date: 11/30/2023     Concerns to be Addressed: discharge planning     Patient plan of care discussed at interdisciplinary rounds: Yes    Anticipated Discharge Disposition: Long Term Care, Hospice     Anticipated Discharge Services: Transportation Services  Anticipated Discharge DME: None    Patient/family educated on Medicare website which has current facility and service quality ratings: no  Education Provided on the Discharge Plan: Yes  Patient/Family in Agreement with the Plan: yes    Referrals Placed by CM/SW:  [facility]  Private pay costs discussed: transportation costs-Daughter Eladio aware potential cost $1800.00    Additional Information:  Writer spoke to [facility]([phone]) updated with stretcher transport time 11/30/23 1-1:45pm.  PCS form completed.    Patient daughter, Eladio, updated with transport time and will plan to be present at [facility] to sign paperwork and bring HCD 11/30.    Fax orders can be sent [phone]-Facility would like discharge orders, scripts by 11am.  Dr Montero updated.     [facility] will admit patient 11/30-daughter aware of this.     Leia Mike CM

## 2023-11-29 NOTE — PROGRESS NOTES
"SPIRITUAL HEALTH SERVICES (SHS)  SPIRITUAL ASSESSMENT Progress Note  [facility]. [address]     REFERRAL SOURCE: LOS      This is a followup visit. Mehreen was more awake than she has been and was enjoying the CARE Channel. We talked about some of the pictures on the TV and then prayed together. She was most comfortable with the Our Father which she mouthed along with me and less comfortable with the \"Manish Neville\" a traditional Spiritism prayer.  As I was leaving, I said \"Don't go anywhere.\" And she responded, \"I won't.\" And then smiled.        PLAN: SHS will follow during LOS .      Sravanthi Avila, Ph.D., [facility]      SHS available 24/7 for emergency requests/referrals, either by having the on-call  paged or by entering an ASAP/STAT consult in Epic (this will also page the on-call ).  "

## 2023-11-29 NOTE — PROGRESS NOTES
Care Management Follow Up    Length of Stay (days): 34    Expected Discharge Date: 11/29/2023     Concerns to be Addressed: discharge planning     Patient plan of care discussed at interdisciplinary rounds: Yes    Anticipated Discharge Disposition: Long Term Care, Hospice     Anticipated Discharge Services: Transportation Services  Anticipated Discharge DME: None    Patient/family educated on Medicare website which has current facility and service quality ratings: no  Education Provided on the Discharge Plan: Yes  Patient/Family in Agreement with the Plan: yes    Referrals Placed by CM/SW:    Private pay costs discussed: transportation costs    Additional Information:  Writer received voice mail Aura from [facility] wondering if patient/family want to use [facility] or [facility]?      Writer returned call to Aura([phone]) left voice mail, sounds like family would likely be OK with either Hospice.    Is facility able to accept patient today?  Please update CM.    UPDATE: Per discussion with daughter, Eladio Dowd, [facility] able to accept tomorrow but needs to know which Hospice agency daughter would like to use-Eladio is happy to use [facility].  Eladio is aware facility unable to accept patient until 11/30.  Aura at [facility] updated Hospice agency choice will check when [facility] can enroll patient tomorrow.    Stretcher transport will need to be arranged for transport 11/30-Unclear time yet when [facility] can accept. PCS form needs to be completed.       Leia Mike CM

## 2023-11-29 NOTE — PROGRESS NOTES
"[facility] Medicine PROGRESS NOTE      Identification/Summary:   83-year-old with history of chronic dementia, osteoporosis with previous compression fractures of T12, L3, L4 and L5, memory care resident admitted for recurrent falls and pain of left hip. Diagnosed with left hip intertrochanteric proximal fracture.       [place] [facility] performed left internal fixation on 10/27/2023.  Suture removal on 11/10. Pt developed postoperative anemia which required transfusion and has been stable.  After meeting with palliative care provider, patient has elected for comfort care on 11/2/23.  Care management consulted for Hospice at care facility. She is awaiting placement for many days.     Hospital day #34    Addendum: 3.30 pm. Care management notified me, she has placement at long term care facility for tomorrow, needs orders before 11 am 11/29/23.     Assessment & Plan  Palliative care enounter  Left hip fracture  Status post left internal fixation on 10/27.  Appreciate orthopedic recommendations.  Toe-touch weightbearing with walker  Activity as tolerated.   Fall precautions.  Palliative care signed off.  Pain is controlled     Essential hypertension  Osteoporosis  Multiple compression fractures of lumbar and thoracic spine.  Moderate aortic stenosis on echocardiogram.  Lewy body dementia/MSA  Hypothyroidism       Diet: Advance Diet as Tolerated: Regular Diet Adult  DVT Prophylaxis:   On comfort care  Code Status: No CPR- Do NOT Intubate    Clinically Significant Risk Factors                  # Hypertension: Noted on problem list     # Dementia: noted on problem list                  Anticipated possible discharge in 1-2 days once hospice care facility milestones are met.    Interval History/Subjective:  Resting comfortably in bed.  No other new overnight events noted.    Physical Exam/Objective:  Vitals I/O   Vital signs:                  Oxygen Delivery: 2 LPM Height: 167.6 cm (5' 6\") Weight: 62.8 kg (138 lb " "7.2 oz)  Estimated body mass index is 22.35 kg/m  as calculated from the following:    Height as of this encounter: 1.676 m (5' 6\").    Weight as of this encounter: 62.8 kg (138 lb 7.2 oz).      I/O last 3 completed shifts:  In: 400 [P.O.:400]  Out: -      Body mass index is 22.35 kg/m .    General Appearance:  Alert, cooperative, no distress   Head:  Normocephalic, atraumatic   Lungs:   Clear to auscultation bilaterally, respirations unlabored   Chest Wall:  No tenderness or deformity   Heart:  Regular rate and rhythm, S1, S2 normal, systolic murmur   Abdomen:   Soft, non tender, non distended, bowel sounds present, no guarding or rigidity   Extremities: No edema, no joint swelling   Skin: Skin color, texture, turgor normal, no rashes or lesions   Neurologic: Alert Moves all 4 extremities       Medications:   Personally Reviewed.   carbidopa-levodopa  1 half-tab Oral TID    DULoxetine  60 mg Oral Daily    gabapentin  100 mg Oral TID    levothyroxine  100 mcg Oral Daily    Lidocaine  2 patch Transdermal Q24H    OLANZapine zydis  5 mg Oral BID    oxyCODONE  5 mg Oral Q6H    senna-docusate  1 tablet Oral BID       Data reviewed today: I personally reviewed all new medications, labs, imaging/diagnostics reports over the past 24 hours. Pertinent findings include    Imaging:   No results found for this or any previous visit (from the past 24 hour(s)).       20 MINUTES SPENT BY ME on the date of service doing chart review, history, exam, documentation & further activities per the note.    Joy Montero MD  Hospitalist  [facility]     "

## 2023-11-30 NOTE — PLAN OF CARE
Problem: Risk for Delirium  Goal: Improved Sleep  Intervention: Promote Sleep  Recent Flowsheet Documentation  Taken 11/30/2023 0130 by Patty Lamar RN  Sleep/Rest Enhancement:   awakenings minimized   comfort measures      Problem: Palliative Care  Goal: Enhanced Quality of Life  Outcome: Progressing      Goal Outcome Evaluation:    Pt resting comfortably. Pt was restless, trying to get OOB at 0200; lavender and reorientation provided but not effective, PRN haldol utilized and effective. Q2 turns continued, side to side turning only to promote wound healing on sacrum. Barrier applied, new mepilex applied.  No BM overnight. Incontinent of urine. Plan to discharge today via stretcher ride.

## 2023-11-30 NOTE — PLAN OF CARE
Problem: Adult Inpatient Plan of Care  Goal: Absence of Hospital-Acquired Illness or Injury  Intervention: Prevent Skin Injury  Recent Flowsheet Documentation  Taken 11/29/2023 1604 by Misty Rhodes RN  Body Position: supine     Problem: Adult Inpatient Plan of Care  Goal: Optimal Comfort and Wellbeing  Outcome: Progressing  Intervention: Provide Person-Centered Care  Recent Flowsheet Documentation  Taken 11/29/2023 1604 by Misty Rhodes RN  Trust Relationship/Rapport:   care explained   emotional support provided     Problem: Orthopaedic Fracture  Goal: Optimal Pain Control and Function  Outcome: Progressing  Intervention: Manage Acute Orthopaedic-Related Pain  Recent Flowsheet Documentation  Taken 11/29/2023 1604 by Misty Rhodes RN  Sleep/Rest Enhancement: awakenings minimized     Pt on comfort care. On RA. Turn/reposition q2hr to prevent skin injury. Pain controled by scheduled oxycodone concentration 5mg. Incontinence care provided with assist of 1-2. Also need assist feeding.

## 2023-11-30 NOTE — DISCHARGE SUMMARY
[facility]  Hospitalist Discharge Summary      Date of Admission:  10/26/2023  Date of Discharge:  11/30/2023  Discharging Provider: Vijay Borja MD  Discharge Service: Hospitalist Service    Discharge Diagnoses   Left hip fracture status post ORIF  Essential hypertension  Osteoporosis  Multiple compression fractures of lumbar and thoracic spine  Moderate aortic stenosis  Lewy body dementia  Hypothyroidism  Clinically Significant Risk Factors          Follow-ups Needed After Discharge   Follow-up Appointments     Follow Up and recommended labs and tests      Follow up with Nursing home physician.  No follow up labs or test are   needed.            Unresulted Labs Ordered in the Past 30 Days of this Admission       Date and Time Order Name Status Description    10/27/2023  7:32 PM Prepare red blood cells (unit) Preliminary         These results will be followed up by     Discharge Disposition   Discharged to long-term care facility  Condition at discharge: Stable    Hospital Course   83-year-old with history of chronic dementia, osteoporosis with previous compression fractures of T12, L3, L4 and L5, memory care resident admitted for recurrent falls and pain of left hip. Diagnosed with left hip intertrochanteric proximal fracture.       [place] [facility] performed left internal fixation on 10/27/2023.  Suture removal on 11/10. Pt developed postoperative anemia which required transfusion and has been stable.  After meeting with palliative care provider, patient has elected for comfort care on 11/2/23.  Care management consulted for Hospice at care facilit    Consultations This Hospital Stay   ORTHOPEDIC SURGERY IP CONSULT  CARDIOLOGY IP CONSULT  CARE MANAGEMENT / SOCIAL WORK IP CONSULT  PHYSICAL THERAPY ADULT IP CONSULT  OCCUPATIONAL THERAPY ADULT IP CONSULT  PALLIATIVE CARE ADULT IP CONSULT  SPIRITUAL HEALTH SERVICES IP CONSULT    Code Status   No CPR- Do NOT Intubate    Time Spent on this  Encounter   I, Vijay Borja MD, personally saw the patient today and spent less than or equal to 30 minutes discharging this patient.       Vijay Borja MD  [address] [address]  Phone: [phone]  Fax: [phone]  ______________________________________________________________________    Physical Exam   Vital Signs:                    Weight: 138 lbs 7.18 oz  Comfortable  awake alert          Primary Care Physician   Dede Crane    Discharge Orders      General info for SNF    Length of Stay Estimate: Long Term Care  Condition at Discharge: Terminal  Level of care:board and care  Rehabilitation Potential: Poor  Admission H&P remains valid and up-to-date: Yes  Recent Chemotherapy: N/A  Use Nursing Home Standing Orders: Yes     Mantoux instructions    Give two-step Mantoux (PPD) Per Facility Policy No     Follow Up and recommended labs and tests    Follow up with Nursing home physician.  No follow up labs or test are needed.     Reason for your hospital stay    Left Hip Fracture required surgery     Activity - Up with nursing assistance     No CPR- Do NOT Intubate     Fall precautions     Crutches DME    DME Documentation: Describe the reason for need to support medical necessity: Impaired gait status post hip surgery. I, the undersigned, certify that the above prescribed supplies are medically necessary for this patient and is both reasonable and necessary in reference to accepted standards of medical practice in the treatment of this patient's condition and is not prescribed as a convenience.     Cane DME    DME Documentation: Describe the reason for need to support medical necessity: Impaired gait status post hip surgery. I, the undersigned, certify that the above prescribed supplies are medically necessary for this patient and is both reasonable and necessary in reference to accepted standards of medical practice in the  treatment of this patient's condition and is not prescribed as a convenience.     Walker DME    : DME Documentation: Describe the reason for need to support medical necessity: Impaired gait status post hip surgery. I, the undersigned, certify that the above prescribed supplies are medically necessary for this patient and is both reasonable and necessary in reference to accepted standards of medical practice in the treatment of this patient's condition and is not prescribed as a convenience.     Diet    Follow this diet upon discharge: Orders Placed This Encounter      Advance Diet as Tolerated: Regular Diet Adult       Significant Results and Procedures       Discharge Medications   Current Discharge Medication List        START taking these medications    Details   atropine 1 % ophthalmic solution Take 1-2 drops by mouth, place under tongue or place inside cheek every 4 hours as needed for secretions    Associated Diagnoses: Hospice care      haloperidol (HALDOL) 2 MG/ML (HIGH CONC) solution Take 0.25-0.5 mLs (0.5-1 mg) by mouth or place under tongue every 6 hours as needed for agitation or other (nausea)    Associated Diagnoses: Hospice care      !! OLANZapine zydis (ZYPREXA) 5 MG ODT Take 1 tablet (5 mg) by mouth 2 times daily    Associated Diagnoses: Lewy body dementia with behavioral disturbance (H)      !! OLANZapine zydis (ZYPREXA) 5 MG ODT Take 1 tablet (5 mg) by mouth every 8 hours as needed for agitation or other (restlessness, nausea)    Associated Diagnoses: Lewy body dementia with behavioral disturbance (H)      oxyCODONE (ROXICODONE INTENSOL) 20 mg/mL (HIGH CONC) solution Place 0.25-0.5 mLs (5-10 mg) under the tongue every hour as needed for moderate pain or severe pain (dyspnea)  Qty: 30 mL, Refills: 0    Associated Diagnoses: Hospice care       !! - Potential duplicate medications found. Please discuss with provider.        CONTINUE these medications which have CHANGED    Details   gabapentin  (NEURONTIN) 100 MG capsule Take 1 capsule (100 mg) by mouth 3 times daily Takes at 0700 and 1345    Associated Diagnoses: Closed nondisplaced intertrochanteric fracture of left femur, initial encounter (H)      lidocaine (LIDODERM) 5 % patch Place 2 patches onto the skin every 24 hours To prevent lidocaine toxicity, patient should be patch free for 12 hrs daily.    Associated Diagnoses: Lewy body dementia with behavioral disturbance (H)      polyethylene glycol (MIRALAX) 17 GM/Dose powder Take 17 g by mouth daily as needed for constipation    Associated Diagnoses: Multiple system atrophy P (H); Slow transit constipation; Chronic left shoulder pain; Irritable bowel syndrome with constipation; Lewy body dementia with behavioral disturbance (H); Movement disorder      senna-docusate (SENOKOT-S/PERICOLACE) 8.6-50 MG tablet Take 1 tablet by mouth 2 times daily    Associated Diagnoses: Closed nondisplaced intertrochanteric fracture of left femur, initial encounter (H)           CONTINUE these medications which have NOT CHANGED    Details   acetaminophen (TYLENOL) 500 MG tablet Take 2 tablets (1,000 mg) by mouth 3 times daily    Associated Diagnoses: Multiple closed fractures of pelvis without disruption of pelvic ring, initial encounter (H)      bisacodyl (DULCOLAX) 10 MG suppository Place 10 mg rectally daily as needed for constipation      carbidopa-levodopa (SINEMET)  MG tablet Take carbidopa/levodopa IR 25/100 mg 1 tab at 7 am, 1 pm and 8 pm = 3 tabs daily  Qty: 270 tablet, Refills: 3    Associated Diagnoses: Multiple system atrophy P (H)      diclofenac (VOLTAREN) 1 % topical gel Apply 2 g topically 3 times daily      DULoxetine (CYMBALTA) 60 MG capsule Take 60 mg by mouth daily      estradiol (ESTRACE) 0.1 MG/GM vaginal cream twice a week Mon and Thurs      levothyroxine (SYNTHROID/LEVOTHROID) 100 MCG tablet Take 1 tablet (100 mcg) by mouth daily  Qty:      Associated Diagnoses: Hypothyroidism, unspecified  type      polyethylene glycol-propylene glycol (SYSTANE ULTRA) 0.4-0.3 % SOLN ophthalmic solution Place 2 drops into both eyes every hour as needed for dry eyes      QUEtiapine (SEROQUEL) 25 MG tablet Take 1 tablet (25 mg) by mouth At Bedtime  Qty:      Associated Diagnoses: Insomnia due to anxiety and fear           STOP taking these medications       hydrALAZINE (APRESOLINE) 10 MG tablet Comments:   Reason for Stopping:         losartan (COZAAR) 50 MG tablet Comments:   Reason for Stopping:         naloxone (NARCAN) 4 MG/0.1ML nasal spray Comments:   Reason for Stopping:         nystatin (MYCOSTATIN) 937826 UNIT/GM external cream Comments:   Reason for Stopping:         oxyCODONE (ROXICODONE) 5 MG tablet Comments:   Reason for Stopping:         oxyCODONE (ROXICODONE) 5 MG tablet Comments:   Reason for Stopping:         sodium phosphate (FLEET ENEMA) 7-19 GM/118ML rectal enema Comments:   Reason for Stopping:         vitamin D3 (CHOLECALCIFEROL) 50 mcg (2000 units) tablet Comments:   Reason for Stopping:             Allergies   Allergies   Allergen Reactions    Penicillins Anaphylaxis, Rash and Shortness Of Breath    Aspirin Nausea and GI Disturbance    Atenolol Cough    Atorvastatin Muscle Pain (Myalgia) and Nausea and Vomiting    Bupropion Nausea    Cephalexin Rash     Localized to neck area    Clindamycin Other (See Comments)     Constipation     Codeine Nausea    Ibuprofen Nausea and GI Disturbance    Lovastatin Muscle Pain (Myalgia)

## 2023-11-30 NOTE — PROGRESS NOTES
Care Management Discharge Note    Discharge Date: 11/30/2023       Discharge Disposition:  [facility] ()    Discharge Services: Transportation Services    Discharge DME: None    Discharge Transportation: agency    PAS Confirmation Code: [id number]    Education Provided on the Discharge Plan: Yes    Persons Notified of Discharge Plans: patient and Eladio (daughter)    Patient/Family in Agreement with the Plan: yes    Handoff Referral Completed: Yes    Additional Information:  CM reviewed chart. CM following for discharge needs. CM left VM for admissions -[facility]([phone])  to confirm discharge  plan. 8:50 AM     [facility] confirmed that facility is able to accept patient today.     CM faxed discharge orders to [facility] (). 9:51 AM     CM updated Eladio (daughter) and she is agreeable with discharge plan. 9:59 AM       CM received a VM from [facility] requesting discharge orders. CM sent orders at 9:51 am but resent discharge orders. 11:24 AM    [facility] time [facility] 1:00 pm - 1:40 pm 11/30/23     Evette Perez RN

## 2023-11-30 NOTE — PLAN OF CARE
"  Problem: Adult Inpatient Plan of Care  Goal: Patient-Specific Goal (Individualized)  Description: You can add care plan individualizations to a care plan. Examples of Individualization might be:  \"Parent requests to be called daily at 9am for status\", \"I have a hard time hearing out of my right ear\", or \"Do not touch me to wake me up as it startles  me\".  Outcome: Adequate for Care Transition   Goal Outcome Evaluation:       Pt alert to self. On comfort care. PRN oxycodone given at 1300 for comfort during transport. Q2 turns and incontinence care provided. Transport arrived for stretcher ride to LTC facility at 1320. AVS sent with for family, other docs faxed. Family aware of transport and will be waiting at receiving facility.     "

## 2024-10-20 NOTE — ED NOTES
Lab Results   Component Value Date    HGBA1C 7.2 (H) 10/16/2024       Recent Labs     10/19/24  1126 10/19/24  1713 10/19/24  2101 10/20/24  0716   POCGLU 162* 166* 146* 179*       Blood Sugar Average: Last 72 hrs:  (P) [id number]  Left lower extremity cellulitis with an open wound on plantar side.  Rule out osteomyelitis ER ready discussed with podiatry left lower extremity CT without contrast ordered as secondary to GFR in the 40s negative for gas ,Baker's cyst will order venous duplex- no dvt but has bakers cyst    MRI is negative for any osteo discussed with podiatry will do bedside debridement also left toe is ABIs as below potential healing awaiting   Had LEADS -left There is evidence of >75% stenosis noted in the proximal anterior tibial  artery.  As discussed with podiatry and vascular surgeon patient needs angio which is good to be done by IR hopefully Monday 10/21/2024 as the patient needs debridement and in the OR will evaluate if there is a clinical osteo as well looks like podiatry is planning to take to the OR on Wednesday  Patient has a very foul odor the redness is worsened edema is worsened infection is worsening discontinue Ancef changed to Zosyn to cover gram-negative's and anaerobes wound culture is no growth podiatry infectious disease to reevaluate tomorrow   Pt is taking her usually scheduled Norco at this time

## 2024-10-30 NOTE — ED NOTES
"Pt anxious and crying. States has abdominal pain with constipation and only \"little bits\" of stool come out. Worse pain with eating. No nausea/vomiting. Pt very upset because son wants her to stop crying and left room to wait in waiting room. Taking miralax and senna. Was also drinking some kind of \"oil\" but cant get refilled.  "
"Pt had sm brown \" a few jim \" of BM  "
2nd soap suds enema given  
Fleet given. Commode at bedside. Call light with pt  
Pt had sm brown soft BM informed Dr White  
4 = No assist / stand by assistance

## 2024-12-04 NOTE — PROGRESS NOTES
"     [facility] Daily Progress     Patient Name: Mehreen Camara  Date: 3/27/2017  Visit #:  - [facility]   PTA visit #: 1   Referral Diagnosis: Low back pain, hip pain  Referring provider: Danni Ortiz MD Dr. Jennifer Kendall Thomas   Visit Diagnosis:     ICD-10-CM    1. Sacral pain M53.3    2. Acute pain of left hip M25.552    3. Generalized muscle weakness M62.81    4. Chronic bilateral low back pain with right-sided sciatica M54.41     G89.29          Assessment:   Patient feels hip pain has mostly resolved, but R sided gluteal and posterior thigh pain has not improved. Initiated lumbar mobilization with neurodynamic exercise today rather than hip mobilization, which pt tolerated without increased Sx. Pt will be receiving corticosteroid injection for lumbar spine, plan to FU in physical therapy following this.       Goal Status:  Pt. will be independent with home exercise program in : 2 weeks  Pt will: walk indoors/outdoors for >10 minutes with pain <3/10 on NPRS to progress towards returning to exercise in 6 visits   Pt will: roll supine<>sidelying without increased L sided pain in order to improve sleep in 6 visits   Pt will: bend over to tie shoes in sitting with pain <3/10 on NPRS to asisst with dressing in 6 visits  Pt will: tolerate stand for >10 minutes without seated rest in order to assist with independent meal preparation in 6 visits     Plan / Patient Education:     Continue with initial plan of care.  Progress with home program as tolerated.    Subjective:       Pain Ratin  Patient was able to sleep well one night this week, but otherwise she estimates she sleeps for 2 hours per night.   [facility] She will be having R sided lumbar corticosteroid injections.  Left hip pain has mostly resolved, \"it's sore but I can live with it\". Patient feels her right sided buttock and LE pain has worsened.     Objective:       Treatment Today   "   TREATMENT MINUTES COMMENTS   Evaluation     Self-care/ Home management     Manual therapy 20 Pt was placed in L sidelying with grade III  semispecific traction at L4/5. Performed oscillations through R sided lumbar facets and R PSIS, followed by SLR nerve slider active and passive x 15 reps x 2 sets.      Neuromuscular Re-education     Therapeutic Activity     Therapeutic Exercises 9 Nu-step WL5, 5:00. Cues for SPM >60.    Exercises reviewed:  - Ab sets with unilateral marching x 20 reps total, cues for continued abdominal activation.   - Ab set with glut set and mini bridge x 20 reps.     Gait training     Modality__________________                Total 29    Blank areas are intentional and mean the treatment did not include these items.       Jennifer Diop  3/27/2017   04-Dec-2024

## (undated) DEVICE — Device

## (undated) DEVICE — ROD RM 950MM 3MM 3.8MM BALL TIP STRL

## (undated) DEVICE — SU VICRYL 0 UR-6 27" J603H

## (undated) DEVICE — ESU ENDO SCISSORS 5MM CVD 5DCS

## (undated) DEVICE — SUTURE VICRYL+ 2-0 27IN CT-1 UND VCP259H

## (undated) DEVICE — CLIP APPLIER ENDO 5MM M/L LIGAMAX EL5ML

## (undated) DEVICE — SOL WATER IRRIG 1000ML BOTTLE 07139-09

## (undated) DEVICE — ENDO TROCAR BLUNT TIP KII BALLOON 12X100MM C0R47

## (undated) DEVICE — DRAPE U SPLIT 74X120" 29440

## (undated) DEVICE — DRSG TEGADERM 4X4 3/4" 1626W

## (undated) DEVICE — DRILL BIT QUICK COUPLING 3 FLUTE 4.2MMX330/100MM CALIBRATE

## (undated) DEVICE — CUSTOM PACK GEN MAJOR SBA5BGMHEA

## (undated) DEVICE — PLATE GROUNDING ADULT W/CORD 9165L

## (undated) DEVICE — DRAPE POUCH INSTRUMENT 3 POCKET 1018L

## (undated) DEVICE — ENDO TROCAR FIRST ENTRY KII FIOS ADV FIX 05X100MM CFF03

## (undated) DEVICE — DECANTER VIAL 2006S

## (undated) DEVICE — GLOVE BIOGEL PI ORTHOPRO SZ 7.5 47675

## (undated) DEVICE — ESU HOLSTER PLASTIC DISP E2400

## (undated) DEVICE — WIRE GUIDE 3.2X400MM  357.399

## (undated) DEVICE — SUCTION TIP FLEXI CLEAR TIP DISP K62

## (undated) DEVICE — BLADE KNIFE SURG 15 371115

## (undated) DEVICE — MAT FLOOR SURGICAL 40X38 0702140238

## (undated) DEVICE — DRAPE U-POUCH 34X29" 1067

## (undated) DEVICE — DRAPE C-ARMOR 5 SIDED 5523

## (undated) DEVICE — SU VICRYL 4-0 FS-2 27" J422-H

## (undated) DEVICE — SUCTION IRRIGATION STRYKFLOW II W/TIP DISP 250-070-520

## (undated) DEVICE — SUTURE VICRYL+ 0 27IN CT-1 UND VCP260H

## (undated) DEVICE — ENDO POUCH UNIV RETRIEVAL SYSTEM INZII 10MM CD001

## (undated) DEVICE — DRAPE IOBAN INCISE 23X17" 6650EZ

## (undated) DEVICE — GLOVE PROTEXIS W/NEU-THERA 8.0  2D73TE80

## (undated) DEVICE — ESU CORD MONOPOLAR 10'  E0510

## (undated) DEVICE — GLOVE BIOGEL PI ULTRATOUCH G SZ 7.5 42175

## (undated) DEVICE — IMP CABLE SYN ORTHO COCR W/CRIMP 1.7X750MM 611.105.01S: Type: IMPLANTABLE DEVICE | Site: HIP | Status: NON-FUNCTIONAL

## (undated) DEVICE — GOWN XLG DISP 9545

## (undated) DEVICE — ESU PENCIL W/COATED BLADE E2450H

## (undated) DEVICE — GLOVE BIOGEL PI INDICATOR 8.0 LF 41680

## (undated) DEVICE — DRAPE STERI U 1015

## (undated) DEVICE — KIT PATIENT CARE HANA TABLE PROFX SUPINE 6855

## (undated) DEVICE — PADDING CAST 4IN WEBRIL STRL 2502

## (undated) DEVICE — DRAPE C-ARM 60X42" 1013

## (undated) DEVICE — ADHESIVE SWIFTSET 0.8ML OCTYL SS6

## (undated) DEVICE — ESU LIGASURE MARYLAND LAPAROSCOPIC SLR/DVDR 5MMX37CM LF1937

## (undated) DEVICE — SOL NACL 0.9% IRRIG 1000ML BOTTLE 07138-09

## (undated) DEVICE — DRSG GAUZE 4X4" TRAY 6939

## (undated) DEVICE — SOL WATER IRRIG 1000ML BOTTLE 2F7114

## (undated) DEVICE — ENDO TROCAR SLEEVE KII ADV FIXATION 05X100MM CFS02

## (undated) DEVICE — PREP CHLORAPREP 26ML TINTED HI-LITE ORANGE 930815

## (undated) DEVICE — DRSG XEROFORM 1X8"

## (undated) DEVICE — SU ETHILON 2-0 FS 18" 664G

## (undated) DEVICE — RAD RX CONRAY 60% (50ML) CHARGE PER ML

## (undated) DEVICE — DRILL BIT CANNULATED 16MM HOLLOW STER 03.037.004S

## (undated) DEVICE — PREP CHLORAPREP 26ML TINTED ORANGE  260815

## (undated) DEVICE — GLOVE BIOGEL INDICATOR 7.5 LF 41675

## (undated) DEVICE — SOL NACL 0.9% IRRIG 1000ML BOTTLE 2F7124

## (undated) DEVICE — ESU PENCIL SMOKE EVAC W/ROCKER SWITCH 0703-047-000

## (undated) DEVICE — KIT PROCEDURE PINPOINT PP9036

## (undated) RX ORDER — FENTANYL CITRATE-0.9 % NACL/PF 10 MCG/ML
PLASTIC BAG, INJECTION (ML) INTRAVENOUS
Status: DISPENSED
Start: 2023-01-01

## (undated) RX ORDER — DEXAMETHASONE SODIUM PHOSPHATE 4 MG/ML
INJECTION, SOLUTION INTRA-ARTICULAR; INTRALESIONAL; INTRAMUSCULAR; INTRAVENOUS; SOFT TISSUE
Status: DISPENSED
Start: 2018-12-12

## (undated) RX ORDER — ETOMIDATE 2 MG/ML
INJECTION INTRAVENOUS
Status: DISPENSED
Start: 2023-01-01

## (undated) RX ORDER — HYDROMORPHONE HYDROCHLORIDE 1 MG/ML
INJECTION, SOLUTION INTRAMUSCULAR; INTRAVENOUS; SUBCUTANEOUS
Status: DISPENSED
Start: 2018-12-12

## (undated) RX ORDER — BUPIVACAINE HYDROCHLORIDE AND EPINEPHRINE 5; 5 MG/ML; UG/ML
INJECTION, SOLUTION EPIDURAL; INTRACAUDAL; PERINEURAL
Status: DISPENSED
Start: 2018-12-12

## (undated) RX ORDER — LEVOFLOXACIN 5 MG/ML
INJECTION, SOLUTION INTRAVENOUS
Status: DISPENSED
Start: 2018-12-12

## (undated) RX ORDER — FENTANYL CITRATE 50 UG/ML
INJECTION, SOLUTION INTRAMUSCULAR; INTRAVENOUS
Status: DISPENSED
Start: 2018-12-12

## (undated) RX ORDER — MEPERIDINE HYDROCHLORIDE 25 MG/ML
INJECTION INTRAMUSCULAR; INTRAVENOUS; SUBCUTANEOUS
Status: DISPENSED
Start: 2018-12-12

## (undated) RX ORDER — ONDANSETRON 2 MG/ML
INJECTION INTRAMUSCULAR; INTRAVENOUS
Status: DISPENSED
Start: 2018-12-12

## (undated) RX ORDER — LIDOCAINE HYDROCHLORIDE 10 MG/ML
INJECTION, SOLUTION EPIDURAL; INFILTRATION; INTRACAUDAL; PERINEURAL
Status: DISPENSED
Start: 2018-12-12

## (undated) RX ORDER — FENTANYL CITRATE 50 UG/ML
INJECTION, SOLUTION INTRAMUSCULAR; INTRAVENOUS
Status: DISPENSED
Start: 2023-01-01

## (undated) RX ORDER — PROPOFOL 10 MG/ML
INJECTION, EMULSION INTRAVENOUS
Status: DISPENSED
Start: 2018-12-12

## (undated) RX ORDER — CELECOXIB 200 MG/1
CAPSULE ORAL
Status: DISPENSED
Start: 2018-12-12

## (undated) RX ORDER — PROPOFOL 10 MG/ML
INJECTION, EMULSION INTRAVENOUS
Status: DISPENSED
Start: 2023-01-01

## (undated) RX ORDER — HYDROCODONE BITARTRATE AND ACETAMINOPHEN 5; 325 MG/1; MG/1
TABLET ORAL
Status: DISPENSED
Start: 2018-12-12

## (undated) RX ORDER — CEFAZOLIN SODIUM 1 G/3ML
INJECTION, POWDER, FOR SOLUTION INTRAMUSCULAR; INTRAVENOUS
Status: DISPENSED
Start: 2023-01-01

## (undated) RX ORDER — GLYCOPYRROLATE 0.2 MG/ML
INJECTION, SOLUTION INTRAMUSCULAR; INTRAVENOUS
Status: DISPENSED
Start: 2018-12-12

## (undated) RX ORDER — EPHEDRINE SULFATE 50 MG/ML
INJECTION, SOLUTION INTRAMUSCULAR; INTRAVENOUS; SUBCUTANEOUS
Status: DISPENSED
Start: 2018-12-12